# Patient Record
Sex: MALE | NOT HISPANIC OR LATINO | Employment: OTHER | URBAN - METROPOLITAN AREA
[De-identification: names, ages, dates, MRNs, and addresses within clinical notes are randomized per-mention and may not be internally consistent; named-entity substitution may affect disease eponyms.]

---

## 2017-01-09 ENCOUNTER — TELEPHONE (OUTPATIENT)
Dept: FAMILY MEDICINE | Facility: CLINIC | Age: 67
End: 2017-01-09

## 2017-01-09 NOTE — TELEPHONE ENCOUNTER
Luis Sanchez was interested in this patient potentially receiving [facility] services.  Could you please contact Clarke by phone or in clinic to determine his interest?  Thank you.

## 2017-01-12 ENCOUNTER — TELEPHONE (OUTPATIENT)
Dept: FAMILY MEDICINE | Facility: CLINIC | Age: 67
End: 2017-01-12

## 2017-01-12 NOTE — TELEPHONE ENCOUNTER
"[facility]  @[id number])@      Social Work Care Navigator Note      Patient: Clarke Moreira  Date: January 12, 2017  Preferred Name: Clarke    Previous PHQ-9:   PHQ-9 SCORE 8/1/2014 9/14/2016   Total Score 27 -   Total Score - 27     Previous CHRIS-7:   CHRIS-7 SCORE 8/4/2014   Total Score 18     LIZANDRO LEVEL:  No flowsheet data found.    Preferred Contact: @[id number]@  Type of Contact: Phone call (patient reached)Patient not reached, however the phone was answered.      Data: (subjective / Objective):    Referral from Lety Buenrostro and Dr. Sanchez to introduce to the [facility] service.  Left a message with contact information, as Clarke was not home at time of call.    Lupe Anderson, [facility] SW CCPatient response to [facility] Service offering:   Considering enrolling in [facility] services    Lupe Anderson, Social Work Care Coordinator   ________________________________________________________________  Administrative - Social Work Staff Info:     Update the [facility] Brief Needs Assessment Flowsheet \"enrollment status\"     \"declined\"    \"considering\" enrolling in [facility] services.      \"Interested and will enroll\"    \"waitlisted\"    Update enrollment status to \"enrolled\" only when they have come into clinic and completed the paper consent and brief needs assessment.    Verify that patient has had Diagnostic Assessment within the past 12 months and if not schedule an appointment with the [facility] to complete.                  "

## 2017-02-15 ENCOUNTER — DOCUMENTATION ONLY (OUTPATIENT)
Dept: VASCULAR SURGERY | Facility: CLINIC | Age: 67
End: 2017-02-15

## 2017-08-14 ENCOUNTER — OFFICE VISIT (OUTPATIENT)
Dept: FAMILY MEDICINE | Facility: CLINIC | Age: 67
End: 2017-08-14

## 2017-08-14 VITALS
HEART RATE: 92 BPM | BODY MASS INDEX: 57.1 KG/M2 | DIASTOLIC BLOOD PRESSURE: 73 MMHG | OXYGEN SATURATION: 96 % | SYSTOLIC BLOOD PRESSURE: 137 MMHG | WEIGHT: 315 LBS | RESPIRATION RATE: 18 BRPM | TEMPERATURE: 98.2 F

## 2017-08-14 DIAGNOSIS — I10 ESSENTIAL HYPERTENSION: ICD-10-CM

## 2017-08-14 DIAGNOSIS — E11.8 TYPE 2 DIABETES MELLITUS WITH COMPLICATION, WITHOUT LONG-TERM CURRENT USE OF INSULIN (H): Primary | ICD-10-CM

## 2017-08-14 DIAGNOSIS — L28.0 LICHEN OF SKIN: ICD-10-CM

## 2017-08-14 DIAGNOSIS — I10 ESSENTIAL HYPERTENSION WITH GOAL BLOOD PRESSURE LESS THAN 140/90: ICD-10-CM

## 2017-08-14 DIAGNOSIS — B35.1 ONYCHOMYCOSIS: ICD-10-CM

## 2017-08-14 DIAGNOSIS — E78.5 HYPERLIPIDEMIA LDL GOAL <100: ICD-10-CM

## 2017-08-14 DIAGNOSIS — E03.9 HYPOTHYROIDISM, UNSPECIFIED TYPE: ICD-10-CM

## 2017-08-14 DIAGNOSIS — E11.9 TYPE 2 DIABETES MELLITUS WITHOUT COMPLICATION, WITHOUT LONG-TERM CURRENT USE OF INSULIN (H): ICD-10-CM

## 2017-08-14 DIAGNOSIS — I87.2 STASIS DERMATITIS OF BOTH LEGS: ICD-10-CM

## 2017-08-14 LAB
ALBUMIN SERPL-MCNC: 4 MG/DL (ref 3.5–4.7)
ALP SERPL-CCNC: 84.4 U/L (ref 31.7–110.7)
ALT SERPL-CCNC: 15.6 U/L (ref 0–45)
AST SERPL-CCNC: 16.9 U/L (ref 0–55)
BILIRUB SERPL-MCNC: 1.2 MG/DL (ref 0.2–1.3)
BUN SERPL-MCNC: 6.7 MG/DL (ref 7–21)
CALCIUM SERPL-MCNC: 9.8 MG/DL (ref 8.5–10.1)
CHLORIDE SERPLBLD-SCNC: 94.7 MMOL/L (ref 98–110)
CHOLEST SERPL-MCNC: 94.3 MG/DL (ref 0–200)
CHOLEST/HDLC SERPL: 2.8 {RATIO} (ref 0–5)
CO2 SERPL-SCNC: 27.3 MMOL/L (ref 20–32)
CREAT SERPL-MCNC: 0.9 MG/DL (ref 0.7–1.3)
GFR SERPL CREATININE-BSD FRML MDRD: >90 ML/MIN/1.7 M2
GLUCOSE SERPL-MCNC: 140.3 MG'DL (ref 70–99)
HBA1C MFR BLD: 6.5 % (ref 4.1–5.7)
HDLC SERPL-MCNC: 33.7 MG/DL
LDLC SERPL CALC-MCNC: 46 MG/DL (ref 0–129)
POTASSIUM SERPL-SCNC: 4 MMOL/DL (ref 3.3–4.5)
PROT SERPL-MCNC: 7.3 G/DL (ref 6.8–8.8)
SODIUM SERPL-SCNC: 135.8 MMOL/L (ref 132.6–141.4)
TRIGL SERPL-MCNC: 71.5 MG/DL (ref 0–150)
TSH SERPL DL<=0.005 MIU/L-ACNC: 6.86 MU/L (ref 0.4–4)
VLDL CHOLESTEROL: 14.3 MG/DL (ref 7–32)

## 2017-08-14 RX ORDER — LEVOTHYROXINE SODIUM 150 UG/1
150 TABLET ORAL DAILY
Qty: 30 TABLET | Refills: 11 | Status: ON HOLD | OUTPATIENT
Start: 2017-08-14 | End: 2017-10-23

## 2017-08-14 RX ORDER — MULTIPLE VITAMINS W/ MINERALS TAB 9MG-400MCG
1 TAB ORAL DAILY
Status: ON HOLD | COMMUNITY
End: 2017-10-23

## 2017-08-14 RX ORDER — METOPROLOL SUCCINATE 100 MG/1
100 TABLET, EXTENDED RELEASE ORAL DAILY
Qty: 30 TABLET | Refills: 12 | Status: ON HOLD | OUTPATIENT
Start: 2017-08-14 | End: 2017-10-07

## 2017-08-14 RX ORDER — ATORVASTATIN CALCIUM 40 MG/1
40 TABLET, FILM COATED ORAL DAILY
Qty: 30 TABLET | Refills: 11 | Status: ON HOLD | OUTPATIENT
Start: 2017-08-14 | End: 2017-10-23

## 2017-08-14 RX ORDER — LISINOPRIL 10 MG/1
10 TABLET ORAL DAILY
Qty: 30 TABLET | Refills: 12 | Status: ON HOLD | OUTPATIENT
Start: 2017-08-14 | End: 2017-10-27

## 2017-08-14 RX ORDER — HYDROCHLOROTHIAZIDE 25 MG/1
25 TABLET ORAL DAILY
Qty: 30 TABLET | Refills: 12 | Status: ON HOLD | OUTPATIENT
Start: 2017-08-14 | End: 2017-10-07

## 2017-08-14 RX ORDER — OMEGA-3 FATTY ACIDS/FISH OIL 300-1000MG
1 CAPSULE ORAL DAILY
Qty: 90 CAPSULE | Refills: 3 | Status: ON HOLD | OUTPATIENT
Start: 2017-08-14 | End: 2017-10-23

## 2017-08-14 ASSESSMENT — ENCOUNTER SYMPTOMS
NAUSEA: 0
CHILLS: 0
UNEXPECTED WEIGHT CHANGE: 0
COLOR CHANGE: 1
COUGH: 0
FATIGUE: 1
CONSTIPATION: 0
FEVER: 0
WHEEZING: 0
DIARRHEA: 0
SHORTNESS OF BREATH: 0
CHEST TIGHTNESS: 0
EYE PAIN: 0
ABDOMINAL PAIN: 0
PALPITATIONS: 0
WOUND: 1
VOMITING: 0

## 2017-08-14 NOTE — PROGRESS NOTES
HPI:       Clarke Moreira is a 67 year old who presents for the following  Patient presents with:  Diabetes: follow up   Hypertension    Clarke comes to clinic today for a check of his diabetes, hypertension and hypothyroidism. He has been feeling generally well with the exception of fatigue and depressed mood. He has been dealing with depression and anxiety for a long time. Currently there are some things going on in the building he lives in that have been increasing his anxiety and depression. He has been working on dealing with these issues as best he can. In the past he has tried therapy and medication but did not find them to be helpful. He has some thoughts of suicide but no plans at this time.     He does not check his blood sugars currently. His diet varies, he does not currently limit processed carbohydrates and sugars. He does try to eat fruits and vegetables, usually frozen, as he is able. Tries to avoid heavily salty foods and does not add extra salt in cooking. He does not exercise regularly and admits to sedentary lifestyle. He denies chest pain or pressure. No heart palpitations or dizziness. He has neuropathy in legs/feet. He saw podiatry last year for his feet. They were able to cut his toenails and recommended Lac-hydrin cream. He difficulty applying the cream and has not been using it.        Diabetes Follow-up      Patient is checking blood sugars: not at all         -Last A1C was   Lab Results   Component Value Date    A1C 6.4 09/14/2016    A1C 6.5 08/01/2014    A1C 6.3 06/07/2013    A1C 6.7 03/13/2013    A1C 6.3 12/02/2011        Diabetic concerns: None    Chest Pain or exercise related calf pain (claudication):no     Symptoms of hypoglycemia (low blood sugar): none     Paresthesias (numbness or burning in feet) or sores: Yes has neuropathy in legs/feet. Also has sore on back of calf. New area of raised purple bumps on back of left calf. Is unable to check his feet or care for lower  extremities due to inability to bend over to reach feet.   Diabetic eye exam within the last year?: No, would like referral for this.  Hypertension Follow-up      Outpatient blood pressures are not being checked.    Chest Pain? :No     Low Salt Diet: no added salt  Daily NSAID Use? Takes aspirin daily, no other NSAID use        Did patient take their HTN pills today/last night as usual?  Yes   , Hypothyroidism Follow-up      Since last visit, patient describes the following symptoms: Weight stable, no hair loss, no constipation. Has skin changes to legs, dry scaly skin, but no new changes  Last TSH check was 7.07 on  9/14/17. He is currently taking 125mcg levothyroxine    Adherence and Exercise  Medication side effects: no  How often is a medication missed? Never  Exercise:walking  Minimal activity, walks to get mail. Admits to sedentary lifestyle.        Problem, Medication and Allergy Lists were reviewed and are current.  Patient is an established patient of this clinic.         Review of Systems:   Review of Systems   Constitutional: Positive for fatigue. Negative for chills, fever and unexpected weight change.   Eyes: Negative for pain and visual disturbance.   Respiratory: Negative for cough, chest tightness, shortness of breath and wheezing.    Cardiovascular: Negative for chest pain and palpitations.   Gastrointestinal: Negative for abdominal pain, constipation, diarrhea, nausea and vomiting.   Skin: Positive for color change, rash and wound.             Physical Exam:   Patient Vitals for the past 24 hrs:   BP Temp Temp src Pulse Resp SpO2 Weight   08/14/17 1316 137/73 98.2  F (36.8  C) Oral 92 18 96 % (!) 421 lb (191 kg)     Body mass index is 57.1 kg/(m^2).  Vitals were reviewed and were normal     Physical Exam   Constitutional: He appears well-developed and well-nourished. No distress.   Cardiovascular: Normal rate, regular rhythm and normal heart sounds.    +1 non-pitting edema of bilateral lower  extremities   Pulmonary/Chest: Effort normal and breath sounds normal.   Skin:   Bilateral lower extremities with venous stasis skin changes and scaling of dry skin. Large area of raised purple blisters on back of left calf. Healing scab the size of a dime on right shin and scabbed over ulceration on back of right calf. Skin is thin and pink-tinged throughout. No visible areas of cellulitis.   Bilateral feet with generalized thick, peeling skin.   Toenails are overgrown and thick.          Results:      Results from the last 24 hours  Results for orders placed or performed in visit on 08/14/17 (from the past 24 hour(s))   Hemoglobin A1c ([facility])   Result Value Ref Range    Hemoglobin A1C 6.5 (H) 4.1 - 5.7 %   Comprehensive Metabolic Panel ([facility])   Result Value Ref Range    Albumin 4.0 3.5 - 4.7 mg/dL    Alkaline Phosphatase 84.4 31.7 - 110.7 U/L    ALT 15.6 0.0 - 45.0 U/L    AST 16.9 0.0 - 55.0 U/L    Bilirubin Total 1.2 0.2 - 1.3 mg/dL    Urea Nitrogen 6.7 (L) 7.0 - 21.0 mg/dL    Calcium 9.8 8.5 - 10.1 mg/dL    Chloride 94.7 (L) 98.0 - 110.0 mmol/L    Carbon Dioxide 27.3 20.0 - 32.0 mmol/L    Creatinine 0.9 0.7 - 1.3 mg/dL    Glucose 140.3 (H) 70.0 - 99.0 mg'dL    Potassium 4.0 3.3 - 4.5 mmol/dL    Sodium 135.8 132.6 - 141.4 mmol/L    Protein Total 7.3 6.8 - 8.8 g/dL    GFR Estimate >90 >60.0 mL/min/1.7 m2    GFR Estimate If Black >90 >60.0 mL/min/1.7 m2   Lipid Cascade ([facility])   Result Value Ref Range    Cholesterol 94.3 0.0 - 200.0 mg/dL    Cholesterol/HDL Ratio 2.8 0.0 - 5.0    HDL Cholesterol 33.7 (L) >40.0 mg/dL    LDL Cholesterol Calculated 46 0 - 129 mg/dL    Triglycerides 71.5 0.0 - 150.0 mg/dL    VLDL Cholesterol 14.3 7.0 - 32.0 mg/dL     Assessment and Plan     1. Type 2 diabetes mellitus with complication, without long-term current use of insulin (H)  A1c 6.5 today. Stable on current does of metformin 500mg. Will continue with this dose. Encouraged him to continue to inspect feet for concerns.  Referral to opthalmology for diabetic eye exam. Lipids well controlled, will continue on current dose of atorvastatin 40mg. He should return to clinic in 6 months for a recheck of his diabetes or before if concerns arise.  - Hemoglobin A1c ([facility])  - Comprehensive Metabolic Panel ([facility])  - Lipid Cascade ([facility])  - Albumin Random Urine Quantitative; Future - unable to void at today's visit, plan to get at return visit.   - OPTHALMOLOGY ADULT REFERRAL - INTERNAL    2. Hypothyroidism, unspecified type  TSH levels pending. Will advise patient if changes need to be made to dose.  - TSH    3. Essential hypertension  BP in clinic today looks good. Electrolytes and kidney function WNL. Will continue with current medications: HCTZ 25 mg daily, lisinopril 10mg daily, metoprolol XR 100mg daily. Recheck of HTN in 6 months.    4. Lichen of skin  Encouraged him to care for legs and feet as able and moisturize as able. Will have him see dermatology for further recommendations.  - DERMATOLOGY REFERRAL - INTERNAL    5. Onychomycosis  10 toenails trimmed, excess subungual skin cleared away.  Encouraged him to return to clinic for further foot and nail care as he needs.  - DERMATOLOGY REFERRAL - INTERNAL  Discussed home care nursing for foot care - patient declined.      6.  MDD  PHQ-9 SCORE 8/1/2014 9/14/2016   Total Score 27 -   Total Score - 27   Offered behavioral therapy - patient declined.   Discussed continue care here in clinic, more frequent follow-up.     Follow-up in 3-6 months, sooner with any other concerns.     Options for treatment and follow-up care were reviewed with the patient. Clarke Moreira  engaged in the decision making process and verbalized understanding of the options discussed and agreed with the final plan.    Kaya Hastings, RN FNP student    History and physical examination done with student nurse practitioner.  Student acted as scribe for this encounter.  I agree with assessment and plan for  this patient.     Katharine Mcmullen, REAGAN CNP

## 2017-08-14 NOTE — PATIENT INSTRUCTIONS
Here is the plan from today's visit    Results for orders placed or performed in visit on 08/14/17   Hemoglobin A1c ([facility])   Result Value Ref Range    Hemoglobin A1C 6.5 (H) 4.1 - 5.7 %   Comprehensive Metabolic Panel ([facility])   Result Value Ref Range    Albumin 4.0 3.5 - 4.7 mg/dL    Alkaline Phosphatase 84.4 31.7 - 110.7 U/L    ALT 15.6 0.0 - 45.0 U/L    AST 16.9 0.0 - 55.0 U/L    Bilirubin Total 1.2 0.2 - 1.3 mg/dL    Urea Nitrogen 6.7 (L) 7.0 - 21.0 mg/dL    Calcium 9.8 8.5 - 10.1 mg/dL    Chloride 94.7 (L) 98.0 - 110.0 mmol/L    Carbon Dioxide 27.3 20.0 - 32.0 mmol/L    Creatinine 0.9 0.7 - 1.3 mg/dL    Glucose 140.3 (H) 70.0 - 99.0 mg'dL    Potassium 4.0 3.3 - 4.5 mmol/dL    Sodium 135.8 132.6 - 141.4 mmol/L    Protein Total 7.3 6.8 - 8.8 g/dL    GFR Estimate >90 >60.0 mL/min/1.7 m2    GFR Estimate If Black >90 >60.0 mL/min/1.7 m2   Lipid Cascade ([facility])   Result Value Ref Range    Cholesterol 94.3 0.0 - 200.0 mg/dL    Cholesterol/HDL Ratio 2.8 0.0 - 5.0    HDL Cholesterol 33.7 (L) >40.0 mg/dL    LDL Cholesterol Calculated 46 0 - 129 mg/dL    Triglycerides 71.5 0.0 - 150.0 mg/dL    VLDL Cholesterol 14.3 7.0 - 32.0 mg/dL       1. Type 2 diabetes mellitus with complication, without long-term current use of insulin (H)  - Hemoglobin A1c ([facility])  - Comprehensive Metabolic Panel ([facility])  - Lipid Cascade ([facility])  - Albumin Random Urine Quantitative; Future  - OPTHALMOLOGY ADULT REFERRAL - INTERNAL    2. Hypothyroidism, unspecified type  - TSH    3. Essential hypertension    4. Lichen of skin  - DERMATOLOGY REFERRAL - INTERNAL    5. Onychomycosis  - DERMATOLOGY REFERRAL - INTERNAL      Follow-up in 3-6 months, sooner as needed.       Thank you for coming to [facility] today.  Lab Testing:  **If you had lab testing today and your results are reassuring or normal they will be mailed to you or sent through [address] [facility] within 7 days.   **If the lab tests need quick action we will call you with the  results.  The phone number we will call with results is # [phone] (home) . If this is not the best number please call our clinic and change the number.  Medication Refills:  If you need any refills please call your pharmacy and they will contact us.   If you need to  your refill at a new pharmacy, please contact the new pharmacy directly. The new pharmacy will help you get your medications transferred faster.   Scheduling:  If you have any concerns about today's visit or wish to schedule another appointment please call our office during normal business hours [phone] (8-5:00 M-F)  If a referral was made to a [facility] and you don't get a call from central scheduling please call [phone].  If a Mammogram was ordered for you at [facility] call [phone] to schedule or change your appointment.  If you had an XRay/CT/Ultrasound/MRI ordered the number is [phone] to schedule or change your radiology appointment.   Medical Concerns:  If you have urgent medical concerns please call [phone] at any time of the day.  If you have a medical emergency please call [phone].

## 2017-08-15 ENCOUNTER — TELEPHONE (OUTPATIENT)
Dept: FAMILY MEDICINE | Facility: CLINIC | Age: 67
End: 2017-08-15

## 2017-08-15 DIAGNOSIS — E03.9 HYPOTHYROIDISM, UNSPECIFIED TYPE: Primary | ICD-10-CM

## 2017-08-15 NOTE — TELEPHONE ENCOUNTER
Called patient and discussed elevated TSH.  Levothyroxine dose increased to 150 mcg.  Plan for recheck of TSH in 8 weeks.  Future lab order placed in chart. - Pat, CNP

## 2017-09-23 ENCOUNTER — APPOINTMENT (OUTPATIENT)
Dept: GENERAL RADIOLOGY | Facility: CLINIC | Age: 67
DRG: 291 | End: 2017-09-23
Attending: EMERGENCY MEDICINE
Payer: COMMERCIAL

## 2017-09-23 ENCOUNTER — HOSPITAL ENCOUNTER (INPATIENT)
Facility: CLINIC | Age: 67
LOS: 15 days | Discharge: NURSING, REHAB OR LONG-TERM CARE | DRG: 291 | End: 2017-10-08
Attending: EMERGENCY MEDICINE | Admitting: INTERNAL MEDICINE
Payer: COMMERCIAL

## 2017-09-23 ENCOUNTER — APPOINTMENT (OUTPATIENT)
Dept: CT IMAGING | Facility: CLINIC | Age: 67
DRG: 291 | End: 2017-09-23
Attending: EMERGENCY MEDICINE
Payer: COMMERCIAL

## 2017-09-23 DIAGNOSIS — F33.41 RECURRENT MAJOR DEPRESSIVE DISORDER, IN PARTIAL REMISSION (H): ICD-10-CM

## 2017-09-23 DIAGNOSIS — I10 ESSENTIAL HYPERTENSION: ICD-10-CM

## 2017-09-23 DIAGNOSIS — E87.70 HYPERVOLEMIA, UNSPECIFIED HYPERVOLEMIA TYPE: ICD-10-CM

## 2017-09-23 DIAGNOSIS — I48.91 ATRIAL FIBRILLATION, UNSPECIFIED TYPE (H): ICD-10-CM

## 2017-09-23 DIAGNOSIS — R07.9 CHEST PAIN, UNSPECIFIED: ICD-10-CM

## 2017-09-23 DIAGNOSIS — R06.02 SOB (SHORTNESS OF BREATH): ICD-10-CM

## 2017-09-23 DIAGNOSIS — M62.81 GENERALIZED MUSCLE WEAKNESS: ICD-10-CM
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
## 2017-09-23 DIAGNOSIS — I73.9 PAD (PERIPHERAL ARTERY DISEASE) (H): Primary | ICD-10-CM

## 2017-09-23 DIAGNOSIS — R33.9 URINARY RETENTION: ICD-10-CM
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
## 2017-09-23 PROBLEM — E78.5 HYPERLIPIDEMIA LDL GOAL <100: Status: ACTIVE | Noted: 2017-09-23

## 2017-09-23 LAB
ALBUMIN SERPL-MCNC: 2.6 G/DL (ref 3.4–5)
ALBUMIN UR-MCNC: NEGATIVE MG/DL
ALP SERPL-CCNC: 70 U/L (ref 40–150)
ALT SERPL W P-5'-P-CCNC: 21 U/L (ref 0–70)
ANION GAP SERPL CALCULATED.3IONS-SCNC: 10 MMOL/L (ref 3–14)
APPEARANCE UR: CLEAR
AST SERPL W P-5'-P-CCNC: 17 U/L (ref 0–45)
BASOPHILS # BLD AUTO: 0 10E9/L (ref 0–0.2)
BASOPHILS NFR BLD AUTO: 0.1 %
BILIRUB SERPL-MCNC: 1 MG/DL (ref 0.2–1.3)
BILIRUB UR QL STRIP: NEGATIVE
BUN SERPL-MCNC: 10 MG/DL (ref 7–30)
CALCIUM SERPL-MCNC: 8.2 MG/DL (ref 8.5–10.1)
CHLORIDE SERPL-SCNC: 87 MMOL/L (ref 94–109)
CK SERPL-CCNC: 84 U/L (ref 30–300)
CO2 SERPL-SCNC: 29 MMOL/L (ref 20–32)
COLOR UR AUTO: ABNORMAL
CREAT SERPL-MCNC: 0.76 MG/DL (ref 0.66–1.25)
CREAT SERPL-MCNC: 0.77 MG/DL (ref 0.66–1.25)
D DIMER PPP FEU-MCNC: 0.8 UG/ML FEU (ref 0–0.5)
DIFFERENTIAL METHOD BLD: ABNORMAL
EOSINOPHIL # BLD AUTO: 0 10E9/L (ref 0–0.7)
EOSINOPHIL NFR BLD AUTO: 0.2 %
ERYTHROCYTE [DISTWIDTH] IN BLOOD BY AUTOMATED COUNT: 13.7 % (ref 10–15)
GFR SERPL CREATININE-BSD FRML MDRD: >90 ML/MIN/1.7M2
GFR SERPL CREATININE-BSD FRML MDRD: >90 ML/MIN/1.7M2
GLUCOSE SERPL-MCNC: 120 MG/DL (ref 70–99)
GLUCOSE UR STRIP-MCNC: NEGATIVE MG/DL
HCT VFR BLD AUTO: 51.6 % (ref 40–53)
HGB BLD-MCNC: 17.2 G/DL (ref 13.3–17.7)
HGB UR QL STRIP: NEGATIVE
IMM GRANULOCYTES # BLD: 0 10E9/L (ref 0–0.4)
IMM GRANULOCYTES NFR BLD: 0.1 %
KETONES UR STRIP-MCNC: NEGATIVE MG/DL
LEUKOCYTE ESTERASE UR QL STRIP: NEGATIVE
LYMPHOCYTES # BLD AUTO: 0.8 10E9/L (ref 0.8–5.3)
LYMPHOCYTES NFR BLD AUTO: 7.4 %
MAGNESIUM SERPL-MCNC: 1.5 MG/DL (ref 1.6–2.3)
MCH RBC QN AUTO: 31.2 PG (ref 26.5–33)
MCHC RBC AUTO-ENTMCNC: 33.3 G/DL (ref 31.5–36.5)
MCV RBC AUTO: 94 FL (ref 78–100)
MONOCYTES # BLD AUTO: 1 10E9/L (ref 0–1.3)
MONOCYTES NFR BLD AUTO: 8.9 %
MUCOUS THREADS #/AREA URNS LPF: PRESENT /LPF
NEUTROPHILS # BLD AUTO: 9 10E9/L (ref 1.6–8.3)
NEUTROPHILS NFR BLD AUTO: 83.3 %
NITRATE UR QL: NEGATIVE
NRBC # BLD AUTO: 0 10*3/UL
NRBC BLD AUTO-RTO: 0 /100
NT-PROBNP SERPL-MCNC: 1745 PG/ML (ref 0–900)
OSMOLALITY UR: 161 MMOL/KG (ref 100–1200)
PH UR STRIP: 6 PH (ref 5–7)
PLATELET # BLD AUTO: 158 10E9/L (ref 150–450)
PLATELET # BLD AUTO: 192 10E9/L (ref 150–450)
POTASSIUM SERPL-SCNC: 3.8 MMOL/L (ref 3.4–5.3)
POTASSIUM UR-SCNC: 8 MMOL/L
PROT SERPL-MCNC: 5.6 G/DL (ref 6.8–8.8)
RBC # BLD AUTO: 5.52 10E12/L (ref 4.4–5.9)
RBC #/AREA URNS AUTO: 0 /HPF (ref 0–2)
SODIUM SERPL-SCNC: 125 MMOL/L (ref 133–144)
SODIUM UR-SCNC: 43 MMOL/L
SOURCE: ABNORMAL
SP GR UR STRIP: 1 (ref 1–1.03)
TROPONIN I BLD-MCNC: 0 UG/L (ref 0–0.1)
TROPONIN I SERPL-MCNC: 0.01 UG/L (ref 0–0.04)
TROPONIN I SERPL-MCNC: <0.015 UG/L (ref 0–0.04)
TSH SERPL DL<=0.005 MIU/L-ACNC: 3.11 MU/L (ref 0.4–4)
UROBILINOGEN UR STRIP-MCNC: NORMAL MG/DL (ref 0–2)
WBC # BLD AUTO: 10.8 10E9/L (ref 4–11)
WBC #/AREA URNS AUTO: <1 /HPF (ref 0–2)

## 2017-09-23 PROCEDURE — 25000132 ZZH RX MED GY IP 250 OP 250 PS 637: Performed by: INTERNAL MEDICINE

## 2017-09-23 PROCEDURE — 84300 ASSAY OF URINE SODIUM: CPT | Performed by: INTERNAL MEDICINE

## 2017-09-23 PROCEDURE — 71010 XR CHEST PORT 1 VW: CPT

## 2017-09-23 PROCEDURE — 25000128 H RX IP 250 OP 636: Performed by: RADIOLOGY

## 2017-09-23 PROCEDURE — 84484 ASSAY OF TROPONIN QUANT: CPT | Performed by: EMERGENCY MEDICINE

## 2017-09-23 PROCEDURE — 25000128 H RX IP 250 OP 636: Performed by: INTERNAL MEDICINE

## 2017-09-23 PROCEDURE — 84443 ASSAY THYROID STIM HORMONE: CPT | Performed by: EMERGENCY MEDICINE

## 2017-09-23 PROCEDURE — 87040 BLOOD CULTURE FOR BACTERIA: CPT | Performed by: INTERNAL MEDICINE

## 2017-09-23 PROCEDURE — 25000128 H RX IP 250 OP 636: Performed by: EMERGENCY MEDICINE

## 2017-09-23 PROCEDURE — 81001 URINALYSIS AUTO W/SCOPE: CPT | Performed by: INTERNAL MEDICINE

## 2017-09-23 PROCEDURE — 84484 ASSAY OF TROPONIN QUANT: CPT

## 2017-09-23 PROCEDURE — 25000125 ZZHC RX 250: Performed by: EMERGENCY MEDICINE

## 2017-09-23 PROCEDURE — 85025 COMPLETE CBC W/AUTO DIFF WBC: CPT | Performed by: EMERGENCY MEDICINE

## 2017-09-23 PROCEDURE — 99291 CRITICAL CARE FIRST HOUR: CPT | Mod: 25 | Performed by: EMERGENCY MEDICINE

## 2017-09-23 PROCEDURE — 12000001 ZZH R&B MED SURG/OB UMMC

## 2017-09-23 PROCEDURE — 71260 CT THORAX DX C+: CPT

## 2017-09-23 PROCEDURE — 93005 ELECTROCARDIOGRAM TRACING: CPT | Performed by: EMERGENCY MEDICINE

## 2017-09-23 PROCEDURE — 96361 HYDRATE IV INFUSION ADD-ON: CPT | Performed by: EMERGENCY MEDICINE

## 2017-09-23 PROCEDURE — 84133 ASSAY OF URINE POTASSIUM: CPT | Performed by: INTERNAL MEDICINE

## 2017-09-23 PROCEDURE — 80053 COMPREHEN METABOLIC PANEL: CPT | Performed by: EMERGENCY MEDICINE

## 2017-09-23 PROCEDURE — 93010 ELECTROCARDIOGRAM REPORT: CPT | Mod: Z6 | Performed by: EMERGENCY MEDICINE

## 2017-09-23 PROCEDURE — 82550 ASSAY OF CK (CPK): CPT | Performed by: EMERGENCY MEDICINE

## 2017-09-23 PROCEDURE — 84484 ASSAY OF TROPONIN QUANT: CPT | Performed by: INTERNAL MEDICINE

## 2017-09-23 PROCEDURE — 96374 THER/PROPH/DIAG INJ IV PUSH: CPT | Performed by: EMERGENCY MEDICINE

## 2017-09-23 PROCEDURE — 36415 COLL VENOUS BLD VENIPUNCTURE: CPT | Performed by: INTERNAL MEDICINE

## 2017-09-23 PROCEDURE — 96375 TX/PRO/DX INJ NEW DRUG ADDON: CPT | Performed by: EMERGENCY MEDICINE

## 2017-09-23 PROCEDURE — 83935 ASSAY OF URINE OSMOLALITY: CPT | Performed by: INTERNAL MEDICINE

## 2017-09-23 PROCEDURE — 99285 EMERGENCY DEPT VISIT HI MDM: CPT | Mod: 25 | Performed by: EMERGENCY MEDICINE

## 2017-09-23 PROCEDURE — 85049 AUTOMATED PLATELET COUNT: CPT | Performed by: INTERNAL MEDICINE

## 2017-09-23 PROCEDURE — 83880 ASSAY OF NATRIURETIC PEPTIDE: CPT | Performed by: EMERGENCY MEDICINE

## 2017-09-23 PROCEDURE — 83735 ASSAY OF MAGNESIUM: CPT | Performed by: EMERGENCY MEDICINE

## 2017-09-23 PROCEDURE — 82565 ASSAY OF CREATININE: CPT | Performed by: INTERNAL MEDICINE

## 2017-09-23 PROCEDURE — 85379 FIBRIN DEGRADATION QUANT: CPT | Performed by: EMERGENCY MEDICINE

## 2017-09-23 RX ORDER — METOPROLOL SUCCINATE 50 MG/1
100 TABLET, EXTENDED RELEASE ORAL DAILY
Status: DISCONTINUED | OUTPATIENT
Start: 2017-09-24 | End: 2017-09-25

## 2017-09-23 RX ORDER — AMOXICILLIN 250 MG
1-2 CAPSULE ORAL 2 TIMES DAILY
Status: DISCONTINUED | OUTPATIENT
Start: 2017-09-23 | End: 2017-09-26

## 2017-09-23 RX ORDER — ASPIRIN 81 MG/1
81 TABLET, CHEWABLE ORAL DAILY
Status: DISCONTINUED | OUTPATIENT
Start: 2017-09-24 | End: 2017-10-08 | Stop reason: HOSPADM

## 2017-09-23 RX ORDER — MAGNESIUM CARB/ALUMINUM HYDROX 105-160MG
148 TABLET,CHEWABLE ORAL
Status: DISCONTINUED | OUTPATIENT
Start: 2017-09-23 | End: 2017-10-08 | Stop reason: HOSPADM

## 2017-09-23 RX ORDER — HYDROCHLOROTHIAZIDE 25 MG/1
25 TABLET ORAL DAILY
Status: DISCONTINUED | OUTPATIENT
Start: 2017-09-23 | End: 2017-09-23

## 2017-09-23 RX ORDER — METOPROLOL TARTRATE 1 MG/ML
5 INJECTION, SOLUTION INTRAVENOUS ONCE
Status: COMPLETED | OUTPATIENT
Start: 2017-09-23 | End: 2017-09-23

## 2017-09-23 RX ORDER — ONDANSETRON 4 MG/1
4 TABLET, ORALLY DISINTEGRATING ORAL EVERY 6 HOURS PRN
Status: DISCONTINUED | OUTPATIENT
Start: 2017-09-23 | End: 2017-10-08 | Stop reason: HOSPADM

## 2017-09-23 RX ORDER — IOPAMIDOL 755 MG/ML
100 INJECTION, SOLUTION INTRAVASCULAR ONCE
Status: COMPLETED | OUTPATIENT
Start: 2017-09-23 | End: 2017-09-23

## 2017-09-23 RX ORDER — ATORVASTATIN CALCIUM 40 MG/1
40 TABLET, FILM COATED ORAL DAILY
Status: DISCONTINUED | OUTPATIENT
Start: 2017-09-24 | End: 2017-10-08 | Stop reason: HOSPADM

## 2017-09-23 RX ORDER — FUROSEMIDE 10 MG/ML
40 INJECTION INTRAMUSCULAR; INTRAVENOUS ONCE
Status: COMPLETED | OUTPATIENT
Start: 2017-09-23 | End: 2017-09-23

## 2017-09-23 RX ORDER — MULTIPLE VITAMINS W/ MINERALS TAB 9MG-400MCG
1 TAB ORAL DAILY
Status: DISCONTINUED | OUTPATIENT
Start: 2017-09-24 | End: 2017-10-08 | Stop reason: HOSPADM

## 2017-09-23 RX ORDER — CHLORAL HYDRATE 500 MG
1 CAPSULE ORAL DAILY
Status: DISCONTINUED | OUTPATIENT
Start: 2017-09-24 | End: 2017-10-08 | Stop reason: HOSPADM

## 2017-09-23 RX ORDER — FUROSEMIDE 10 MG/ML
20 INJECTION INTRAMUSCULAR; INTRAVENOUS ONCE
Status: COMPLETED | OUTPATIENT
Start: 2017-09-23 | End: 2017-09-23

## 2017-09-23 RX ORDER — ONDANSETRON 2 MG/ML
4 INJECTION INTRAMUSCULAR; INTRAVENOUS EVERY 6 HOURS PRN
Status: DISCONTINUED | OUTPATIENT
Start: 2017-09-23 | End: 2017-10-08 | Stop reason: HOSPADM

## 2017-09-23 RX ORDER — BISACODYL 5 MG
5-15 TABLET, DELAYED RELEASE (ENTERIC COATED) ORAL DAILY PRN
Status: DISCONTINUED | OUTPATIENT
Start: 2017-09-23 | End: 2017-10-08 | Stop reason: HOSPADM

## 2017-09-23 RX ORDER — LISINOPRIL 10 MG/1
10 TABLET ORAL DAILY
Status: DISCONTINUED | OUTPATIENT
Start: 2017-09-24 | End: 2017-10-08 | Stop reason: HOSPADM

## 2017-09-23 RX ORDER — AMMONIUM LACTATE 12 G/100G
CREAM TOPICAL 2 TIMES DAILY PRN
Status: DISCONTINUED | OUTPATIENT
Start: 2017-09-23 | End: 2017-10-08 | Stop reason: HOSPADM

## 2017-09-23 RX ORDER — NALOXONE HYDROCHLORIDE 0.4 MG/ML
.1-.4 INJECTION, SOLUTION INTRAMUSCULAR; INTRAVENOUS; SUBCUTANEOUS
Status: DISCONTINUED | OUTPATIENT
Start: 2017-09-23 | End: 2017-09-28

## 2017-09-23 RX ORDER — POLYETHYLENE GLYCOL 3350 17 G/17G
17 POWDER, FOR SOLUTION ORAL DAILY PRN
Status: DISCONTINUED | OUTPATIENT
Start: 2017-09-23 | End: 2017-09-26

## 2017-09-23 RX ADMIN — IOPAMIDOL 100 ML: 755 INJECTION, SOLUTION INTRAVENOUS at 14:03

## 2017-09-23 RX ADMIN — SODIUM CHLORIDE 1000 ML: 9 INJECTION, SOLUTION INTRAVENOUS at 10:50

## 2017-09-23 RX ADMIN — FUROSEMIDE 40 MG: 10 INJECTION, SOLUTION INTRAVENOUS at 15:50

## 2017-09-23 RX ADMIN — METOPROLOL TARTRATE 5 MG: 5 INJECTION INTRAVENOUS at 15:50

## 2017-09-23 RX ADMIN — CEPHALEXIN 250 MG: 250 CAPSULE ORAL at 21:34

## 2017-09-23 RX ADMIN — METOPROLOL TARTRATE 5 MG: 5 INJECTION INTRAVENOUS at 11:14

## 2017-09-23 RX ADMIN — FUROSEMIDE 20 MG: 10 INJECTION, SOLUTION INTRAVENOUS at 21:34

## 2017-09-23 RX ADMIN — ENOXAPARIN SODIUM 40 MG: 40 INJECTION SUBCUTANEOUS at 21:34

## 2017-09-23 ASSESSMENT — ENCOUNTER SYMPTOMS
POLYPHAGIA: 0
DYSURIA: 0
FEVER: 0
ABDOMINAL PAIN: 0
CHILLS: 0
NAUSEA: 0
CONSTIPATION: 0
POLYDIPSIA: 0
SHORTNESS OF BREATH: 0
BLOOD IN STOOL: 0
VOMITING: 0
HEADACHES: 0

## 2017-09-23 NOTE — PROGRESS NOTES
"Social Work: Assessment with Discharge Plan    Patient Name:  Clarke Moreira  :  1950  Age:  67 year old  MRN:  [id number]  Risk/Complexity Score:     Completed assessment with:  Pt and chart review    Presenting Information   Reason for Referral:  Discharge plan and Mental illness  Date of Intake:  2017  Referral Source:  Nurse  Decision Maker:  Pt  Alternate Decision Maker:  Pt unable to identify NOK. Pt grew up with single mom who was an only child and Clarke himself is an only child with no kids.  Health Care Directive:  Pt advised he doesn't have on and no one to help him fill one out. Pt was forgetful during SW assessment and identified he frequently dissociates, unsure if pt unable to complete at this time.  Living Situation:  Apartment  Previous Functional Status: Pt receives no assistance with ADLs at home but lacks energy/interest to do them independently.   Patient and family understanding of hospitalization:  Pt states he is in the hospital because he fell and couldn't get up today. He requested a social work consult because he feels he is not holding up well at home.  Cultural/Language/Spiritual Considerations:  English speaking  Adjustment to Illness:  Pt feels he is a disappointment and has a lot of shame around how he's let himself go. He advised that he doesn't care if he lives or dies but denies a plan to kill himself and denies a history of suicide attempts.    Physical Health  Reason for Admission:  No diagnosis found.  Services Needed/Recommended:  TCU vs LTC    Mental Health/Chemical Dependency  Diagnosis:  Self-identifies as having PTSD, depression, and panic attacks. Loses his train of thought during conversation and self-identifies this as dissociating. History of alcohol abuse, denies alcohol and other substance use at this time. Also advises that he has \"overstressed emotional responses\" that impair his ability to maintain relationships  Support/Services in " Place:  None  Services Needed/Recommended:  Case management, talk therapy, Adult protection follow-up.    Support System  Significant relationship at present time:  None identified.  Family of origin is available for support:  No.  Other support available:  None.  Gaps in support system:  Pt has no natural support, no friends, family, and no mental health or physical health support.  Patient is caregiver to:  None     Provider Information   Primary Care Physician:  Matthias Sanchez   [phone]   Clinic:  [facility] [address] [address]      :  Pt says he has worked with [facility] in the past but denies having a specific .    Financial   Income Source:  Disability  Financial Concerns:  In subsidized housing at the moment that he found on his own.  Insurance:    Payor/Plan Subscriber Name Rel Member # Group #   UC[employer] - [employer]*  [id number] [facility]      [address]       Discharge Plan   Patient and family discharge goal:  Open to suggestions. Admits he can no longer care for himself.  Provided education on discharge plan:  YES Advised on the difference between TCU and LTC.  Patient agreeable to discharge plan:  YES  A list of Medicare Certified Facilities was provided to the patient and/or family to encourage patient choice. Patient's choices for facility are:  TCU vs LTC dependent on further medical evaluation.  Will NH provide Skilled rehabilitation or complex medical:  NO  General information regarding anticipated insurance coverage and possible out of pocket cost was discussed. Patient and patient's family are aware patient may incur the cost of transportation to the facility, pending insurance payment: NO  Barriers to discharge:  Pending medical clearance.    Discharge Recommendations   Anticipated Disposition:  Facility:  TBD  Transportation Needs:  Medical Rides  Name of Transportation Company and Phone:  [employer] transportation  "[phone] vs [facility] [phone]    Additional comments     Pt presents today after falling in his home and not being able to get up. He requested to meet with SW after nurse inquired with EMS on whether a social work consult would be beneficial. Clarke expressed immense shame over the life choices he has made. In his conversation with this SW, he would often forget what he was talking about and SW would need to redirect. SW left the room after speaking with the pt for 10 minutes while pt got imaging and when SW returned Clarke did not recognize her or remember the previous conversation. SW reintroduced herself and Clarke began to recollect some of the previous conversation. He informed this SW that he has PTSD and sometimes dissociates or simply forgets recent events. He said he used to get therapy twice a week but then his therapist moved around 2009 and he said he didn't have the energy to pursue finding another. He also retired around this time and has slowly become more and more bound to a chair in his home. With great reservation, he confided in this SW that he uses \"alternative facilities\" to go to the bathroom, and that food is delivered to him by two people who used to work for [employer].He did not want SW to contact these individuals, so he did not give a name or number. The only other human contact he has is someone who brings library books to his home occasionally. He again remarks that this is a casual volunteer that he doesn't want anyone to reach out to.  Clarke says he has a fear of leaving his home without of  and this has restricted from receiving any assistance as he has no one who he can run errands or attend appointments with.     Pt did have a SW at the [facility] apt he lives at that he had a good relationship with. He remarks she left a few weeks ago and they have yet to fill her position. Clarke identifies himself as abrasive with people, and therefore " "unable to maintain relationships but was able to have a good relationship with this SW. This SW asked if there were specific triggers to his abrasive \"overstressed emotional responses\" and he could not identify those that would be important is his medical care. Of note, a few weeks ago, he also got a diabetes diagnosis and he feels a lot of shame around this as well. He said prior to these events, he was getting up more. Now when he gets up, its about 10-12 steps to get something to eat or use the toilet and otherwise he stays in his chair at all times. Clarke advised that in the last couple years, he has fallen multiple times trying to use the tub, and now just uses a hand towel.    Clarke expressed to this SW that he does not care if he lives or dies, but denies a plan to intentionally hurt himself. He acknowledges he lacks the energy to \"get back on his feet\" on his own and would like additional support. He is open to TCU and LTC options.    Adult Protection Report filed under self-neglect. Report #[id number]. Clarke is notified of this.    Andra WALKER, [facility]  ED   ED Pager: [phone]  On-call/After hours Pager: [phone]  "

## 2017-09-23 NOTE — IP AVS SNAPSHOT
MRN:[id number]                      After Visit Summary   9/23/2017    Clarke Moreira    MRN: [id number]           Thank you!     Thank you for choosing [facility] for your care. Our goal is always to provide you with excellent care. Hearing back from our patients is one way we can continue to improve our services. Please take a few minutes to complete the written survey that you may receive in the mail after you visit with us. Thank you!        Patient Information     Date Of Birth          1950        Designated Caregiver       Most Recent Value    Caregiver    Will someone help with your care after discharge? no      About your hospital stay     You were admitted on:  September 23, 2017 You last received care in the:  [address] [facility]    You were discharged on:  October 8, 2017        Reason for your hospital stay       You were admitted following a fall due to physical decompensation. The hospitalization was complicated by respiratory failure and fluid retention. You respiratory status improved with CPAP every night. On admission your weight was 460 and on discharge [phone].                  Who to Call     For medical emergencies, please call 911.  For non-urgent questions about your medical care, please call your primary care provider or clinic, [phone]          Attending Provider     Provider Specialty    Joel Lyons MD Emergency Medicine    Ajay, MD [facility]    Demetra Stout MD Pulmonary    Usher, Clarke Payne MD Internal Medicine    Janiya Niño MD Internal Medicine       Primary Care Provider Office Phone # Fax #    Matthias Sanchez [phone]      After Care Instructions     Activity - Up with nursing assistance           Advance Diet as Tolerated       Follow this diet upon discharge: Orders Placed This Encounter      Fluid restriction 1500 ML FLUID      Moderate Consistent CHO Diet            Bladder scan       X 2  for post void residual            Daily weights       Call Provider for weight gain of more than 2 pounds per day or 5 pounds per week.            General info for SNF       Length of Stay Estimate: Long Term Care  Condition at Discharge: Stable  Level of care:skilled   Rehabilitation Potential: Fair  Admission H&P remains valid and up-to-date: Yes  Recent Chemotherapy: N/A  Use Nursing Home Standing Orders: Yes            Intake and output       Every shift            Oxygen - Nasal cannula       1-2 Lpm by nasal cannula to keep O2 sats 88% or greater.            Wound care (specify)       Site:   Right and left leg  Instructions:  Please change dressings daily, clean wounds daily and apply lymphedema wraps                  Follow-up Appointments     Follow Up and recommended labs and tests       Follow up with half-way physician.  The following labs/tests are recommended: BMP, magnesium.    On discharge see your primary care provider in one week. Also you were referred to vascular surgery and cardiology for evaluation of your legs and heart respectively.                  Your next 10 appointments already scheduled     Nov 02, 2017  2:00 PM CDT   US VICK DOPPLER NO EXERCISE 1-2 LVLS BILAT with [id number]   [facility])    [address] [place] [address] [phone]           Please bring a list of your medicines (including vitamins, minerals and over-the-counter drugs). Also, tell your doctor about any allergies you may have. Wear comfortable clothes and leave your valuables at home.  No caffeine or tobacco for 1 hour prior to exam.  Please call the Imaging Department at your exam site with any questions.            Nov 02, 2017  3:00 PM CDT   US TCO2 with [id number] [facility]  ([facility])    [address] [address] [phone]           Please bring a list  of your medicines (including vitamins, minerals and over-the-counter drugs). Also, tell your doctor about any allergies you may have. Wear comfortable clothes and leave your valuables at home.  No caffeine or tobacco for 1 hour prior to exam.  Please call the Imaging Department at your exam site with any questions.            Nov 02, 2017  4:00 PM CDT   (Arrive by 3:45 PM)   New Vascular Visit with Christa Velazquez MD   [facility] ([facility])    [address] [facility]  3rd Floor  [facility] [address] [phone]              Additional Services     CARDIOLOGY EVAL ADULT REFERRAL       Your provider has referred you to: [facility] heart failure clinic    Please be aware that coverage of these services is subject to the terms and limitations of your health insurance plan.  Call member services at your health plan with any benefit or coverage questions.      Type of Referral:  New Cardiology Consult    Timeframe requested:  1-2 weeks after discharge    Please bring the following to your appointment:  >>   Any x-rays, CTs or MRIs which have been performed.  Contact the facility where they were done to arrange for  prior to your scheduled appointment.    >>   List of current medications  >>   This referral request   >>   Any documents/labs given to you for this referral            INR CLINIC REFERRAL           Mental Health Referral           Occupational Therapy Adult Consult       Evaluate and treat as clinically indicated.    Reason:  Continue OT in TCU            Physical Therapy Adult Consult       Evaluate and treat as clinically indicated.    Reason:  Continue to follow PT at TCU            Vascular Surgery Referral                 Future tests that were ordered for you     US VICK Doppler No Exercise           US TCO2 Bilateral       Have you called the [facility] to schedule this exam?  Please call [phone].            HEART FAILURE ACTION PLAN       Heart  "Failure Action Plan can be completed in any of the following locations:        Heart Failure (Care Package) under patient instructions        Letters under \"Heart Failure Action Plan\"                INR           US Low Extremity Arterial Duplex Bilateral & VICK w Exercise       PLEASE ALSO INCLUDE TOE BRACHIAL INDEX                  Warfarin Instruction     You have started taking a medicine called warfarin. This is a blood-thinning medicine (anticoagulant). It helps prevent and treat blood clots.      Before leaving the hospital, make sure you know how much to take and how long to take it.      You will need regular blood tests to make sure your blood is clotting safely. It is very important to see your doctor for regular blood tests.    Talk to your doctor before taking any new medicine (this includes over-the-counter drugs and herbal products). Many medicines can interact with warfarin. This may cause more bleeding or too much clotting.     Eating a lot of vitamin K--found in green, leafy vegetables--can change the way warfarin works in your body. Do NOT avoid these foods. Instead, try to eat the same amount each day.     Bleeding is the most common side-effect of warfarin. You may notice bleeding gums, a bloody nose, bruises and bleeding longer when you cut yourself. See a doctor at once if:   o You cough up blood  o You find blood in your stool (poop)  o You have a deep cut, or a cut that bleeds longer than 10 minutes   o You have a bad cut, hard fall, accident or hit your head (go to urgent care or the emergency room).    For women who can get pregnant: This medicine can harm an unborn baby. Be very careful not to get pregnant while taking this medicine. If you think you might be pregnant, call your doctor right away.    For more information, read \"Guide to Warfarin Therapy,  the booklet you received in the hospital.        General Recommendations To Control Heart Failure When You Get Home       Heart Failure " Instructions for Patients and Families: Please read and check off each of these important instructions as you do them when you get home.          Weight and Symptoms       ___ Put a scale in your bathroom.    ___ Post a weight chart or calendar next to your scale.    ___ Weigh yourself everyday as soon as you get up in the morning (before breakfast).  You should only be wearing your pajamas.  Write your weight on the chart/calendar.  ___ Bring your weight chart/calendar with you to all appointments.  ___ Call your doctor or nurse practitioner if you gain 2 pounds (in 1 day) or 5 pounds in (1 week) from your goal  good  weight.  Your good weight is also called your  dry  weight.  Your doctor or nurse will tell you what your good weight should be.    ___ Call your doctor or nurse practitioner if you have shortness of breath that gets worse over time, leg swelling or fatigue.       Medications and Diet       ___ Make sure to take your medication as prescribed.    ___ Bring a current list of your medication and all of your medicine bottles with you to all appointments.    ___ Limit fluids if you still have swelling or shortness of breath, or if your doctor tells you to do so.    ___ Eat less than 2000 mg of sodium (salt) every day. Read food labels, and do not add salt to meals. Remember, if you eat less salt you retain less fluid.  ___ Follow a heart healthy diet that is low in saturated fat.        Activity and Suggested Lifestyle Changes      ___ Stay active. Talk to your doctor about an exercise program that is safe for your heart.  ___ Stop smoking. Reduce alcohol use.      ___ Lose weight if you are overweight. Extra weight puts a lot of stress on the heart.                 Control for Leg Swelling     ___ Keep your legs elevated (raised) as needed for swelling. If swelling is uncomfortable or elevation doesn t help, ask your doctor about using ACE wraps or support stockings.        What is the C.O.R.E.  Clinic?  Cardiomyopathy  Optimization  Rehabilitation  Education    The [facility].R.E. Clinic is a heart failure specialty clinic within [facility].  It is an outpatient disease management program that is based on a phase-by-phase approach, which is tailored to each patient s individual needs.  The cardiologist, nurse practitioner, physician assistant and nurses provide an ongoing outpatient care and treatment plan that guides heart failure and cardiomyopathy patients from evaluation and education to stabilization. This team works with your current primary care doctor and cardiologist to help you:    - Avoid hospitalizations  - Slow the progression of the disease  - Improve length and quality of life  - Know who and when to call if heart failure symptoms appear  - Receive easy access to quality health care and advice  - Better understand your condition and treatment  - Decrease the tremendous cost burden of heart failure care  - Detect future heart problems before they become life threatening                                 Follow-up Appointments     ___ An appointment with the [facility].O.R.E. Clinic may already have been made for you (see section   Your next appointments already scheduled ).  If you have a question about your appointment, would like to speak with a C.O.R.E. nurse, or would like to become a [facility].O.R.E. Clinic patient, please call one of the following locations:     - [facility]):                                             [phone]  - [facility]):                                            [phone]  - [facility] ([place]):                 [phone]      ___ Your C.O.R.E. Clinic Team will continue to educate you on your heart failure and may adjust medications based on your vital signs, lab work, and how you are feeling.  Therefore, it is very important to bring the following to all C.O.R.E. appointments:    - An  "accurate list of your medications  - Your medicine bottles  - Your weight chart/calendar                   Pending Results     Date and Time Order Name Status Description    2017 CBC with platelets In process     2017 Basic metabolic panel In process             Statement of Approval     Ordered          10/08/17 1013  I have reviewed and agree with all the recommendations and orders detailed in this document.  EFFECTIVE NOW     Approved and electronically signed by:  Janiya Niño MD             Admission Information     Date & Time Provider Department Dept. Phone    2017 Janiya Niño MD [address] [facility] [phone]      Your Vitals Were     Blood Pressure Pulse Temperature Respirations Height Weight    92/73 (BP Location: Right arm) 85 97.2  F (36.2  C) (Oral) 16 1.803 m (5' 11\") 184 kg (405 lb 10.3 oz)    Pulse Oximetry BMI (Body Mass Index)                95% 56.58 kg/m2          MyChart Information     [facility] lets you send messages to your doctor, view your test results, renew your prescriptions, schedule appointments and more. To sign up, go to [email or web address] . Click on \"Log in\" on the left side of the screen, which will take you to the Welcome page. Then click on \"Sign up Now\" on the right side of the page.     You will be asked to enter the access code listed below, as well as some personal information. Please follow the directions to create your username and password.     Your access code is: [email or web address]  Expires: 2017  2:53 PM     Your access code will  in 90 days. If you need help or a new code, please call your [facility] clinic or [phone].        Care EveryWhere ID     This is your Care EveryWhere ID. This could be used by other organizations to access your [facility] medical records  [id number]        Equal Access to Services     TYLER LESLIE: Felisa Cotton, shelton gutierrez, myron banda, vasyl cowan " shelbi laNixonaan ah. So [facility] [phone].    ATENCIÓN: Si ernestinela mel, tiene a kim disposición servicios gratuitos de asistencia lingüística. Alexis al [phone].    We comply with applicable federal civil rights laws and [place] laws. We do not discriminate on the basis of race, color, national origin, age, disability, sex, sexual orientation, or gender identity.               Review of your medicines      START taking        Dose / Directions    diltiazem 30 MG tablet   Commonly known as:  CARDIZEM        Dose:  30 mg   Take 1 tablet (30 mg) by mouth 2 times daily   Quantity:  60 tablet   Refills:  3       furosemide 80 MG tablet   Commonly known as:  LASIX   Used for:  Hypervolemia, unspecified hypervolemia type        Dose:  80 mg   Take 1 tablet (80 mg) by mouth daily   Quantity:  90 tablet   Refills:  3       ipratropium - albuterol 0.5 mg/2.5 mg/3 mL 0.5-2.5 (3) MG/3ML neb solution   Commonly known as:  DUONEB        Dose:  3 mL   Take 1 vial (3 mLs) by nebulization every 4 hours as needed for wheezing   Quantity:  360 mL   Refills:  3       tamsulosin 0.4 MG capsule   Commonly known as:  FLOMAX   Used for:  Urinary retention        Dose:  0.4 mg   Take 1 capsule (0.4 mg) by mouth daily   Quantity:  60 capsule   Refills:  1       warfarin 1 MG tablet   Commonly known as:  COUMADIN        Dose:  9 mg   Take 9 tablets (9 mg) by mouth daily   Quantity:  30 tablet   Refills:  3         CONTINUE these medicines which may have CHANGED, or have new prescriptions. If we are uncertain of the size of tablets/capsules you have at home, strength may be listed as something that might have changed.        Dose / Directions    metoprolol 100 MG 24 hr tablet   Commonly known as:  TOPROL-XL   This may have changed:  how much to take   Used for:  Essential hypertension        Dose:  200 mg   Take 2 tablets (200 mg) by mouth daily   Quantity:  30 tablet   Refills:  12         CONTINUE these medicines which have NOT CHANGED         Dose / Directions    aspirin 81 MG tablet   Used for:  Essential hypertension        Dose:  81 mg   Take 1 tablet (81 mg) by mouth daily   Quantity:  30 tablet   Refills:  12       atorvastatin 40 MG tablet   Commonly known as:  LIPITOR   Used for:  Type 2 diabetes mellitus without complication, without long-term current use of insulin (H)        Dose:  40 mg   Take 1 tablet (40 mg) by mouth daily   Quantity:  30 tablet   Refills:  11       levothyroxine 150 MCG tablet   Commonly known as:  SYNTHROID/LEVOTHROID   Used for:  Hypothyroidism, unspecified type        Dose:  150 mcg   Take 1 tablet (150 mcg) by mouth daily   Quantity:  30 tablet   Refills:  11       lisinopril 10 MG tablet   Commonly known as:  PRINIVIL/ZESTRIL   Used for:  Essential hypertension with goal blood pressure less than 140/90        Dose:  10 mg   Take 1 tablet (10 mg) by mouth daily   Quantity:  30 tablet   Refills:  12       metFORMIN 500 MG tablet   Commonly known as:  GLUCOPHAGE   Used for:  Type 2 diabetes mellitus without complication, without long-term current use of insulin (H)        Dose:  500 mg   Take 1 tablet (500 mg) by mouth 2 times daily (with meals)   Quantity:  60 tablet   Refills:  11       Multi-vitamin Tabs tablet        Dose:  1 tablet   Take 1 tablet by mouth daily   Refills:  0       omega 3 1000 MG Caps   Used for:  Hyperlipidemia LDL goal <100        Dose:  1 g   Take 1 g by mouth daily   Quantity:  90 capsule   Refills:  3         STOP taking     hydrochlorothiazide 25 MG tablet   Commonly known as:  HYDRODIURIL                Where to get your medicines      Some of these will need a paper prescription and others can be bought over the counter. Ask your nurse if you have questions.     You don't need a prescription for these medications     diltiazem 30 MG tablet    furosemide 80 MG tablet    ipratropium - albuterol 0.5 mg/2.5 mg/3 mL 0.5-2.5 (3) MG/3ML neb solution    metoprolol 100 MG 24 hr tablet     tamsulosin 0.4 MG capsule    warfarin 1 MG tablet                Protect others around you: Learn how to safely use, store and throw away your medicines at www.disposemymeds.org.             Medication List: This is a list of all your medications and when to take them. Check marks below indicate your daily home schedule. Keep this list as a reference.      Medications           Morning Afternoon Evening Bedtime As Needed    aspirin 81 MG tablet   Take 1 tablet (81 mg) by mouth daily                                atorvastatin 40 MG tablet   Commonly known as:  LIPITOR   Take 1 tablet (40 mg) by mouth daily   Last time this was given:  40 mg on 10/8/2017  8:49 AM                                diltiazem 30 MG tablet   Commonly known as:  CARDIZEM   Take 1 tablet (30 mg) by mouth 2 times daily   Last time this was given:  30 mg on 10/8/2017  8:54 AM                                furosemide 80 MG tablet   Commonly known as:  LASIX   Take 1 tablet (80 mg) by mouth daily   Last time this was given:  80 mg on 10/8/2017  8:57 AM                                ipratropium - albuterol 0.5 mg/2.5 mg/3 mL 0.5-2.5 (3) MG/3ML neb solution   Commonly known as:  DUONEB   Take 1 vial (3 mLs) by nebulization every 4 hours as needed for wheezing   Last time this was given:  3 mLs on 9/28/2017  8:34 PM                                levothyroxine 150 MCG tablet   Commonly known as:  SYNTHROID/LEVOTHROID   Take 1 tablet (150 mcg) by mouth daily   Last time this was given:  150 mcg on 10/8/2017  8:50 AM                                lisinopril 10 MG tablet   Commonly known as:  PRINIVIL/ZESTRIL   Take 1 tablet (10 mg) by mouth daily   Last time this was given:  10 mg on 10/8/2017  8:52 AM                                metFORMIN 500 MG tablet   Commonly known as:  GLUCOPHAGE   Take 1 tablet (500 mg) by mouth 2 times daily (with meals)                                metoprolol 100 MG 24 hr tablet   Commonly known as:  TOPROL-XL   Take  2 tablets (200 mg) by mouth daily   Last time this was given:  200 mg on 10/8/2017  8:51 AM                                Multi-vitamin Tabs tablet   Take 1 tablet by mouth daily   Last time this was given:  1 tablet on 10/8/2017  8:55 AM                                omega 3 1000 MG Caps   Take 1 g by mouth daily   Last time this was given:  1 g on 10/8/2017  8:48 AM                                tamsulosin 0.4 MG capsule   Commonly known as:  FLOMAX   Take 1 capsule (0.4 mg) by mouth daily   Last time this was given:  0.4 mg on 10/8/2017  8:55 AM                                warfarin 1 MG tablet   Commonly known as:  COUMADIN   Take 9 tablets (9 mg) by mouth daily   Last time this was given:  9 mg on 10/7/2017  6:23 PM

## 2017-09-23 NOTE — ED PROVIDER NOTES
"  History     Chief Complaint   Patient presents with     Chest Pain     Fall     HPI  Clarke Moreira is a 67 year old male with a history of MI x2 and morbid obesity presents to the ED via EMS for evaluation of chest pain and fall.  Patient states he was sitting in his chair and he tried to get up but fell on his right knee and then could not stand. He also reports chest pain that started 30 minutes after falling and trying to get up. Patient describes his left-sided chest pain as a \"dull ache\".He was able to call the security personnel at his apartment who called 911. Patient denies shortness of breath. EMS gave him 4 baby ASA and 1 dose nitro without change in pain. Patient denies loss of consciousness or head trauma. Denies nausea or vomiting. Denies shortness of breath. Denies fevers, chills, or any cold-like symptoms.     I have reviewed the Medications, Allergies, Past Medical and Surgical History, and Social History in the [employer] system.    Past Medical History:   Diagnosis Date     Basal cell carcinoma        Past Surgical History:   Procedure Laterality Date     BIOPSY OF SKIN LESION       MOHS MICROGRAPHIC PROCEDURE         Family History   Problem Relation Age of Onset     CANCER No family hx of      No family history of skin cancer       Social History   Substance Use Topics     Smoking status: Never Smoker     Smokeless tobacco: Never Used     Alcohol use Not on file       Current Facility-Administered Medications   Medication     0.9% sodium chloride BOLUS     metoprolol (LOPRESSOR) injection 5 mg     Current Outpatient Prescriptions   Medication     multivitamin, therapeutic with minerals (MULTI-VITAMIN) TABS tablet     aspirin 81 MG tablet     atorvastatin (LIPITOR) 40 MG tablet     hydrochlorothiazide (HYDRODIURIL) 25 MG tablet     levothyroxine (SYNTHROID/LEVOTHROID) 150 MCG tablet     lisinopril (PRINIVIL/ZESTRIL) 10 MG tablet     metFORMIN (GLUCOPHAGE) 500 MG tablet     metoprolol (TOPROL-XL) " 100 MG 24 hr tablet     omega 3 1000 MG CAPS     ammonium lactate (LAC-HYDRIN) 12 % cream     Omega-3 Fatty Acids (OMEGA-3 FISH OIL PO)        No Known Allergies    Review of Systems   Constitutional: Negative for chills and fever.   Respiratory: Negative for shortness of breath.    Cardiovascular: Positive for chest pain.   Gastrointestinal: Negative for abdominal pain, blood in stool, constipation, nausea and vomiting.   Endocrine: Negative for polydipsia and polyphagia.   Genitourinary: Negative for dysuria.   Neurological: Negative for headaches.   All other systems reviewed and are negative.      Physical Exam   BP: 100/66  Heart Rate: 130  Temp: 98.8  F (37.1  C)  Resp: 20  Weight: (!) 205.2 kg (452 lb 6.1 oz)  SpO2: (!) 70 %  Physical Exam   Constitutional: He is oriented to person, place, and time. He appears distressed.   HENT:   Head: Normocephalic and atraumatic.   Right Ear: External ear normal.   Left Ear: External ear normal.   Mouth/Throat: Oropharynx is clear and moist.   Eyes: Conjunctivae are normal. Pupils are equal, round, and reactive to light.   Neck: Normal range of motion.   Cardiovascular: Normal pulses.  An irregularly irregular rhythm present. Tachycardia present.    Pulmonary/Chest: Effort normal. No stridor. Tachypnea noted. No respiratory distress.   Abdominal: Soft. He exhibits no distension. There is no tenderness. There is no rebound.   Neurological: He is alert and oriented to person, place, and time.   Skin: Skin is warm and dry. Rash noted. He is not diaphoretic.        Psychiatric: He has a normal mood and affect. His behavior is normal.       ED Course     ED Course     Procedures             EKG Interpretation:      Interpreted by Joel Lyons  Time reviewed: 1030   Symptoms at time of EKG: weakness   Rhythm: irregularly irregular  Rate: tachycardia  Axis: normal  Ectopy: none  Conduction: normal  ST Segments/ T Waves: No ST-T wave changes  Q Waves: none  Comparison to  prior: Unchanged    Clinical Impression: abnormal EKG    Critical Care time:  was 30 minutes for this patient excluding procedures.     Labs Ordered and Resulted from Time of ED Arrival Up to the Time of Departure from the ED   CBC WITH PLATELETS DIFFERENTIAL   COMPREHENSIVE METABOLIC PANEL   TROPONIN I   TSH   MAGNESIUM   D DIMER QUANTITATIVE   ISTAT TROPONIN NURSING POCT   TROPONIN POCT            Assessments & Plan (with Medical Decision Making)   67-year-old type II diabetic arriving to the Emergency Department today via EMS due to concern of fall with associated chest discomfort.  Upon arrival, the patient is noted to be alert and afebrile. He is tachycardic with an irregular rhythm with rates in the 1 teens to 130s. EKG does confirm atrial fibrillation with RVR. I do not appreciate signs of strain or ischemic type pattern, QRS morphology consistent with previous EKGs. Otherwise the patient was reportedly having mild chest discomfort on the left and known CAD i-STAT troponin negative. With onset of symptoms approximately half an hour ago occurring after falling, I do believe he ll require a formal troponin and serial cardiac biomarkers under observation status. Further the patient has had hypoxia which appears to be new and is currently speaking in full sentences without evidence of increased work of breathing at baseline however upon arrival, the patient noted to have a CO2 low 70s requiring supplemental oxygen. As I take off his nasal cannula at bedside, the patient is quickly become hypoxic to low 80s with no exertion at rest. Etiology is unclear if this time. He reports no cough, cold-like symptoms, or lower respiratory disease. We will plan to obtain chest x-ray. With associated fall I would obtain a chest CT although clinical suspicion for pulmonary embolism at this time is low.  This part of the document was transcribed by Susan Saenz Medical Scribe.     Addendum:   Ultimately the patient did have a  slight elevation in d-dimer prompting CT scan of the chest which demonstrated no pulmonary embolus, pneumothorax, or aortic pathology.  There is note of pulmonary edema, I suspect is consistent with the degree of failure in the setting of A. fib with RVR. Rates do continue to range between 100 to 130.  He s been given metoprolol and a dose of Lasix in the emergency department. With concern of inability to safely care for himself at home, I think initial gentle diuresis will need to be managed as an inpatient. The patient is agreement with this plan.     I have reviewed the nursing notes.    I have reviewed the findings, diagnosis, plan and need for follow up with the patient.    New Prescriptions    No medications on file       Final diagnoses:   Generalized muscle weakness   ISusan, am serving as a trained medical scribe to document services personally performed by Joel Lyons MD, based on the provider's statements to me.    Joel RO MD, was physically present and have reviewed and verified the accuracy of this note documented by Susan Saenz.     9/23/2017   [facility], [place], EMERGENCY DEPARTMENT     Joel Lyons MD  09/23/17 [id number]

## 2017-09-23 NOTE — H&P
"[facility], [place]    Internal Medicine History and Physical - [facility] Service       Date of Admission:  9/23/2017    Chief Complaint   Fall, chest pain, shortness of breath    History is obtained from the patient    History of Present Illness   Clarke Moreira is a 67 year old male  who has a history of morbid obesity, diabetes, possible CHF and is admitted for fall and subsequent chest pain and shortness of breath.  He fell while getting out of his chair, saying he \"did not have enough strength to keep my balance\".  He had no prodromal symptoms before falling, such as dizziness, shortness of breath, chest pain, vision changes, etc.  No loss of consciousness during the episode, no urinary incontinence, no tongue biting or shaking.  No post-fall confusion.  Was not able to get up by himself after the fall due to leg weakness. He called security who called 911.    Approximately 30 minutes after the fall he developed sudden onset chest pain and shortness of breath.  The chest pain was in hs mid-left chest and is described as achy and initially sharp.  It was not worse with movement, only with anxiety.  He didn't remember if the shortness of breath was sudden onset, but describes it as a gasping for air. Nothing made these worse (except the anxiety) or better.    He also has some pain in his legs; the right calf he describes as a mild pinching pain (where ulcer is located).  He has also had some mild back pain since he was moved to [facility].  The back pain occurred suddenly \"across my rear\" from the right to left.  The leg, back, and chest pain have gotten better.    Of note, he has had another fall this week in a similar manner (minus the chest pain and shortness of breath).  He was, however, able to get up so did not seek medical services.  He notes that he has had a few falls in the last five years.  He associates these with renovates in the apartment including vinyl fela being " installed which is slippery.  He also has weeping from the legs, which makes the floor more slippery.  He has also had some falls related to the bathtub, therefore he uses a towel rather than showering    Review of Systems   The 10 point Review of Systems is negative other than noted in the HPI or here.   Fatigue for a few weeks  Confusion and feeling muddled for a few months  He describes occasionally waking up at night gasping for breath (PND)    Past Medical History    I have reviewed this patient's medical history and updated it with pertinent information if needed.   Past Medical History:   Diagnosis Date     Basal cell carcinoma     T2DM, non insulin dependent, HbA1c 6.5% (August)  Atrial fibrillation  Morbid obesity  PTSD, depression, panic attacks  Joint pain  Hypothyroidism  Hyperlipidemia  Possible congestive heart failure  States has had previous heart attacks    Past Surgical History   I have reviewed this patient's surgical history and updated it with pertinent information if needed.  Past Surgical History:   Procedure Laterality Date     BIOPSY OF SKIN LESION       MOHS MICROGRAPHIC PROCEDURE         Social History   Social History   Substance Use Topics     Smoking status: Never Smoker     Smokeless tobacco: Never Used     Alcohol use No      Comment: Former alcohol abuse. Quit 70s then quit later in 80s-present   Lives in apartment [facility] in [place] [address] with elevator.  Doesn't often go out of apartment and often stays in chair except to use the bathroom and go to the kitchen for food.  Not able to clean. Gets food and other necessities from food delivery services.    Family History   I have reviewed this patient's family history and updated it with pertinent information if needed.   Family History   Problem Relation Age of Onset     CANCER No family hx of      No family history of skin cancer   Strong family history of mental health conditions, no diabetes, not sure on other  conditions as he was not close to his family and states they are all dead.    Prior to Admission Medications   Prior to Admission Medications   Prescriptions Last Dose Informant Patient Reported? Taking?   Omega-3 Fatty Acids (OMEGA-3 FISH OIL PO)   Yes No   Sig: Take 1,000 mg by mouth daily   ammonium lactate (LAC-HYDRIN) 12 % cream   No No   Sig: Apply topically 2 times daily as needed for dry skin   Patient not taking: Reported on 8/14/2017   aspirin 81 MG tablet   No No   Sig: Take 1 tablet (81 mg) by mouth daily   atorvastatin (LIPITOR) 40 MG tablet   No No   Sig: Take 1 tablet (40 mg) by mouth daily   hydrochlorothiazide (HYDRODIURIL) 25 MG tablet   No No   Sig: Take 1 tablet (25 mg) by mouth daily   levothyroxine (SYNTHROID/LEVOTHROID) 150 MCG tablet   No No   Sig: Take 1 tablet (150 mcg) by mouth daily   lisinopril (PRINIVIL/ZESTRIL) 10 MG tablet   No No   Sig: Take 1 tablet (10 mg) by mouth daily   metFORMIN (GLUCOPHAGE) 500 MG tablet   No No   Sig: Take 1 tablet (500 mg) by mouth 2 times daily (with meals)   metoprolol (TOPROL-XL) 100 MG 24 hr tablet   No No   Sig: Take 1 tablet (100 mg) by mouth daily   multivitamin, therapeutic with minerals (MULTI-VITAMIN) TABS tablet   Yes No   Sig: Take 1 tablet by mouth daily   omega 3 1000 MG CAPS   No No   Sig: Take 1 g by mouth daily      Facility-Administered Medications: None     Allergies   No Known Allergies    Physical Exam   Vital Signs: Temp: 95.9  F (35.5  C) Temp src: Oral BP: 113/63   Heart Rate: 108 Resp: 20 SpO2: 95 % O2 Device: Nasal cannula Oxygen Delivery: 2.5 LPM  Weight: 460 lbs 11.2 oz    General Appearance: slightly unkempt, non-toxic, occasionally shivering, morbidly obese  Eyes: CN II, III, IV, VI intact, no injection, no exophthalmos  HEENT: NC/AT, no lympahdenopathy, mild pharnygeal erythema, no tonsillar exudates, no obvious oral ulcers, no obvious thyromegaly or thyroid nodules  Respiratory: Chest clear anteriorly, no fingernail  clubbing  Cardiovascular: Hands cool to touch, HS I+II+0, JVP not visible 2/2 soft tissue  GI: Enlarged, no percussable organomegaly. BS present  Lymph/Hematologic: No petechiae or ecchymoses  Genitourinary: No rashes, no scrotal edema, no current discharge.    Skin: Lower limbs: lymphedema with cracking and peeling skin, serous exudate leaking, erythematous, ulcers on base of increased erythema warmer than contralateral side, large cut dorsum L foot on more erythematous base  Musculoskeletal: Symmetrical muscle bulk, considerable weakness  Neurologic: CNs II-XII normal. Toure's and Rinne's, gag reflex, chin jerk, corneal reflex not tested. Power 4+/5 bilateral upper limbs.  Power 2/5 bilateral quads. CAn't bend more than a few degrees at knees. No sensation to light touch legs up to approx 2-3 inches below knees.  Sensation normal upper limbs.  Psychiatric: Appreciate, thankful.  Appears depressed and to have low self-esteem    Assessment & Plan   Clarke Moreira is a 67 year old male admitted on 9/23/2017. He has a history of T2DM, previous falls and is admitted for fall and chest pain and shortness of breath    # Fall  No prodromal symptoms, no LOC, no post-ictal state. Likely mechanical fall, contributed by muscle weakness and deconditioning, slippery floor and weeping from legs.  -CK  -PT/OT  -SW consult  - Repeat troponin    # Atypical chest pain  # Shortness of breath  Trop negative.  CXR-bilateral opacities. CT negative for PE. ECG-A fib without ischemic changes. Musculoskeletal vs rhabdo from prolonged lay vs cardiac failure vs ischemia.  Ischemia less likely despite positive medical history as not typical, negative trop and ECG  - Respiratory viruses  - Cardiac investigations as below  - Titrate down oxygen    #Leg ulcers and cellulitis without sepsis  No signs sepsis but clinically appears infected given erythema and warmth.  - Cefalexin  - WOC consult  - Lymphedema consult  - Lactate if triggers  (currently only elevated heart rate)    # Atrial fibrillation with RVR  # Suggestion of pulmonary hypertension on CT  # Pleural effusions  CT suggestive of pulmonary hypertension. Previous echo in 2008. Patient mentioned he had previous MIs.  - Echo for pulmonary hypertension, cardiac failure/regional walls  - Furosemide if cardiac failure    # Hyponatremia  Na 125. Likely hypervolemic due to presumed cardiac failure. No current symptoms of hyponatremia although he notes confusion for a few months-not previously investigated  - Urine studies, osmolarities  - Stop hydrochlorothiazide  - Fluid restriction; 1500 for now then after urine studies can base on calculations  - Trend    # Depression  Lack of self-care, low self esteem. Depression since teens, has therapist.  Limited social support. Pt declined therapy here in hospital or chaplaincy visit. Not on antidepressant  - Suggest antidepressant trial. To discuss with patient pamela    Diet: Combination Diet Low Saturated Fat Na <2400mg Diet, No Caffeine Diet  Fluid restriction 1500 ML FLUID  Fluids: none  DVT Prophylaxis: Enoxaparin (Lovenox) SQ  Code Status: DNR / DNI    Disposition Plan   Expected discharge: 2 - 3 days; recommended to TCU once cellulitis treated, pulmonary hypertension investigated, lymphedema treated, PT/OT have seen.     Entered: Kayla Lee 09/23/2017, 6:12 PM   Information in the above section will display in the discharge planner report.    The patient was discussed with Dr. AKANKSHA Lee  [address]   Pager: [phone]  Please see sticky note for cross cover information      Data   Data     Recent Labs  Lab 09/23/17  1049 09/23/17  1043   WBC 10.8  --    HGB 17.2  --    MCV 94  --      --    *  --    POTASSIUM 3.8  --    CHLORIDE 87*  --    CO2 29  --    BUN 10  --    CR 0.77  --    ANIONGAP 10  --    CHERI 8.2*  --    *  --    ALBUMIN 2.6*  --    PROTTOTAL 5.6*  --     BILITOTAL 1.0  --    ALKPHOS 70  --    ALT 21  --    AST 17  --    TROPI 0.015  --    TROPONIN  --  0.00

## 2017-09-23 NOTE — IP AVS SNAPSHOT
` ` Patient Information     Patient Name Sex Clarke Noland ([id number]) Male 1950       Room Bed    5208 5208-01      Patient Demographics     Address Phone    [address]  [facility] [address] [phone] (Home)      Patient Ethnicity & Race     Ethnic Group Patient Race    American White      Emergency Contact(s)     Name Relation Home Work Mobile    Janice Hillman Friend   [phone]    DENIS CLINE Friend [phone]      Documents on File        Status Date Received Description       Documents for the Patient    Privacy Notice - Beulah Received 11     Insurance Card  () 03     Face Sheet  () 03     External Medication Information Consent Accepted 16     Patient ID Received 16 [place]    Consent for Services - Hospital/Clinic Received () 11     Consent for Services - [facility] Received 13     Consent for EHR Access Received 14     Consent for Services - [facility]  13 GENERAL CONSENT FORM: SHARED EHR - ENGLISH, 2013    [facility] Specified Other       Consent for Services - Hospital/Clinic Received () 14     Consent for EHR Access  14 [facility] WITH NON-Mobile HEALTH CARE PROVIDERS    Consent for Services - Hospital/Clinic  () 14 CONSENT FOR SERVICE    HIM [facility]IMA Authorization - File Only  09/15/14 KATE[facility]S    Consent for Services/Privacy Notice - Hospital/Clinic Received () 16     Insurance Card Received 16 [facility]       Documents for the Encounter    CMS IM for Patient Signature Received 17     Photograph   WOC 17 right heel     Monitoring Device Output  10/04/17 INITIAL MONITORING STRIPS    Monitoring Device Output  10/04/17 MONITORING STRIPS SHEET 2      Admission Information     Attending Provider Admitting Provider Admission Type Admission Date/Time    Janiya Niño MD [place],  MD [facility] 09/23/17  1025    Discharge Date Hospital Service Auth/Cert Status Service Area     Internal Medicine Incomplete [facility]    Unit Room/Bed Admission Status       UU U5A 5208/5208-01 Admission (Confirmed)       Admission     Complaint    Fall from chair, initial encounter, SOB (shortness of breath)      Hospital Account     Name Acct ID Class Status Primary Coverage    Clarke Moreira [id number] Inpatient Open UCARE - [employer] FOR [employer]            Guarantor Account (for Hospital Account #[id number])     Name Relation to Pt Service Area Active? Acct Type    Clarke Moreira  FCS Yes Personal/Family    Address Phone          [address]  [place] [address] [phone](H)              Coverage Information (for Hospital Account #[id number])     F/O Payor/Plan Precert #    UCARE/[employer]ARE FOR SENIORS     Subscriber Subscriber #    Clarke Moreira [id number]    Address Phone    [address]  [place] [address] [phone]

## 2017-09-23 NOTE — ED NOTES
Clarke presents by ambulance from home with c/o fall and chest pain. He states he was sitting in his chair and he tried to get up but fell on his right knee and then could not stand. He also reports chest pain that started 30 minutes after falling and trying to get up. He was able to call the security personnel at his apartment who called 911. History of MI x2 and morbid obesity. Satting in the 70s on RA. Denies SOB. Alert and oriented. EMS noted oozing leg wounds and skin breakdown on his bottom. EMS gave him 4 baby ASA and 1 dose nitro without change in pain.

## 2017-09-24 ENCOUNTER — APPOINTMENT (OUTPATIENT)
Dept: GENERAL RADIOLOGY | Facility: CLINIC | Age: 67
DRG: 291 | End: 2017-09-24
Payer: COMMERCIAL

## 2017-09-24 ENCOUNTER — APPOINTMENT (OUTPATIENT)
Dept: ULTRASOUND IMAGING | Facility: CLINIC | Age: 67
DRG: 291 | End: 2017-09-24
Attending: INTERNAL MEDICINE
Payer: COMMERCIAL

## 2017-09-24 ENCOUNTER — APPOINTMENT (OUTPATIENT)
Dept: GENERAL RADIOLOGY | Facility: CLINIC | Age: 67
DRG: 291 | End: 2017-09-24
Attending: INTERNAL MEDICINE
Payer: COMMERCIAL

## 2017-09-24 ENCOUNTER — APPOINTMENT (OUTPATIENT)
Dept: CARDIOLOGY | Facility: CLINIC | Age: 67
DRG: 291 | End: 2017-09-24
Payer: COMMERCIAL

## 2017-09-24 LAB
ANION GAP SERPL CALCULATED.3IONS-SCNC: 15 MMOL/L (ref 3–14)
BASE DEFICIT BLDA-SCNC: NORMAL MMOL/L
BASE DEFICIT BLDV-SCNC: NORMAL MMOL/L
BASE EXCESS BLDA CALC-SCNC: 2.6 MMOL/L
BASE EXCESS BLDA CALC-SCNC: 3.6 MMOL/L
BASE EXCESS BLDA CALC-SCNC: NORMAL MMOL/L
BASE EXCESS BLDV CALC-SCNC: 3 MMOL/L
BASE EXCESS BLDV CALC-SCNC: 3.6 MMOL/L
BASE EXCESS BLDV CALC-SCNC: 6.2 MMOL/L
BASE EXCESS BLDV CALC-SCNC: 7.6 MMOL/L
BASE EXCESS BLDV CALC-SCNC: 7.6 MMOL/L
BASE EXCESS BLDV CALC-SCNC: NORMAL MMOL/L
BUN SERPL-MCNC: 11 MG/DL (ref 7–30)
CALCIUM SERPL-MCNC: 8.4 MG/DL (ref 8.5–10.1)
CHLORIDE SERPL-SCNC: 89 MMOL/L (ref 94–109)
CO2 SERPL-SCNC: 26 MMOL/L (ref 20–32)
CREAT SERPL-MCNC: 1.02 MG/DL (ref 0.66–1.25)
ERYTHROCYTE [DISTWIDTH] IN BLOOD BY AUTOMATED COUNT: 13.9 % (ref 10–15)
ERYTHROCYTE [DISTWIDTH] IN BLOOD BY AUTOMATED COUNT: 14.1 % (ref 10–15)
ERYTHROCYTE [DISTWIDTH] IN BLOOD BY AUTOMATED COUNT: NORMAL % (ref 10–15)
FLUAV H1 2009 PAND RNA SPEC QL NAA+PROBE: NEGATIVE
FLUAV H1 RNA SPEC QL NAA+PROBE: NEGATIVE
FLUAV H3 RNA SPEC QL NAA+PROBE: NEGATIVE
FLUAV RNA SPEC QL NAA+PROBE: NEGATIVE
FLUBV RNA SPEC QL NAA+PROBE: NEGATIVE
GFR SERPL CREATININE-BSD FRML MDRD: 73 ML/MIN/1.7M2
GLUCOSE SERPL-MCNC: 135 MG/DL (ref 70–99)
HADV DNA SPEC QL NAA+PROBE: NEGATIVE
HADV DNA SPEC QL NAA+PROBE: NEGATIVE
HCO3 BLD-SCNC: 32 MMOL/L (ref 21–28)
HCO3 BLD-SCNC: 38 MMOL/L (ref 21–28)
HCO3 BLD-SCNC: NORMAL MMOL/L (ref 21–28)
HCO3 BLDV-SCNC: 37 MMOL/L (ref 21–28)
HCO3 BLDV-SCNC: 37 MMOL/L (ref 21–28)
HCO3 BLDV-SCNC: 38 MMOL/L (ref 21–28)
HCO3 BLDV-SCNC: 39 MMOL/L (ref 21–28)
HCO3 BLDV-SCNC: 40 MMOL/L (ref 21–28)
HCO3 BLDV-SCNC: NORMAL MMOL/L (ref 21–28)
HCT VFR BLD AUTO: 50.5 % (ref 40–53)
HCT VFR BLD AUTO: 57.7 % (ref 40–53)
HCT VFR BLD AUTO: NORMAL % (ref 40–53)
HGB BLD-MCNC: 16.1 G/DL (ref 13.3–17.7)
HGB BLD-MCNC: 18.6 G/DL (ref 13.3–17.7)
HGB BLD-MCNC: NORMAL G/DL (ref 13.3–17.7)
HMPV RNA SPEC QL NAA+PROBE: NEGATIVE
HPIV1 RNA SPEC QL NAA+PROBE: NEGATIVE
HPIV2 RNA SPEC QL NAA+PROBE: NEGATIVE
HPIV3 RNA SPEC QL NAA+PROBE: NEGATIVE
LACTATE BLD-SCNC: 0.8 MMOL/L (ref 0.7–2)
MCH RBC QN AUTO: 30.6 PG (ref 26.5–33)
MCH RBC QN AUTO: 31.6 PG (ref 26.5–33)
MCH RBC QN AUTO: NORMAL PG (ref 26.5–33)
MCHC RBC AUTO-ENTMCNC: 31.9 G/DL (ref 31.5–36.5)
MCHC RBC AUTO-ENTMCNC: 32.2 G/DL (ref 31.5–36.5)
MCHC RBC AUTO-ENTMCNC: NORMAL G/DL (ref 31.5–36.5)
MCV RBC AUTO: 96 FL (ref 78–100)
MCV RBC AUTO: 98 FL (ref 78–100)
MCV RBC AUTO: NORMAL FL (ref 78–100)
MICROBIOLOGIST REVIEW: NORMAL
O2/TOTAL GAS SETTING VFR VENT: 100 %
O2/TOTAL GAS SETTING VFR VENT: 21 %
O2/TOTAL GAS SETTING VFR VENT: 40 %
O2/TOTAL GAS SETTING VFR VENT: 60 %
O2/TOTAL GAS SETTING VFR VENT: 60 %
O2/TOTAL GAS SETTING VFR VENT: 80 %
O2/TOTAL GAS SETTING VFR VENT: ABNORMAL %
O2/TOTAL GAS SETTING VFR VENT: NORMAL %
O2/TOTAL GAS SETTING VFR VENT: NORMAL %
OSMOLALITY SERPL: 278 MMOL/KG (ref 280–301)
OXYHGB MFR BLD: NORMAL % (ref 92–100)
OXYHGB MFR BLDV: 84 %
PCO2 BLD: 112 MM HG (ref 35–45)
PCO2 BLD: 66 MM HG (ref 35–45)
PCO2 BLD: NORMAL MM HG (ref 35–45)
PCO2 BLDV: 106 MM HG (ref 40–50)
PCO2 BLDV: 109 MM HG (ref 40–50)
PCO2 BLDV: 113 MM HG (ref 40–50)
PCO2 BLDV: 74 MM HG (ref 40–50)
PCO2 BLDV: 85 MM HG (ref 40–50)
PCO2 BLDV: NORMAL MM HG (ref 40–50)
PH BLD: 7.14 PH (ref 7.35–7.45)
PH BLD: 7.29 PH (ref 7.35–7.45)
PH BLD: NORMAL PH (ref 7.35–7.45)
PH BLDV: 7.14 PH (ref 7.32–7.43)
PH BLDV: 7.15 PH (ref 7.32–7.43)
PH BLDV: 7.17 PH (ref 7.32–7.43)
PH BLDV: 7.27 PH (ref 7.32–7.43)
PH BLDV: 7.31 PH (ref 7.32–7.43)
PH BLDV: NORMAL PH (ref 7.32–7.43)
PLATELET # BLD AUTO: 163 10E9/L (ref 150–450)
PLATELET # BLD AUTO: 169 10E9/L (ref 150–450)
PLATELET # BLD AUTO: NORMAL 10E9/L (ref 150–450)
PO2 BLD: 122 MM HG (ref 80–105)
PO2 BLD: 58 MM HG (ref 80–105)
PO2 BLD: NORMAL MM HG (ref 80–105)
PO2 BLDV: 47 MM HG (ref 25–47)
PO2 BLDV: 50 MM HG (ref 25–47)
PO2 BLDV: 57 MM HG (ref 25–47)
PO2 BLDV: 58 MM HG (ref 25–47)
PO2 BLDV: 90 MM HG (ref 25–47)
PO2 BLDV: NORMAL MM HG (ref 25–47)
POTASSIUM SERPL-SCNC: 4 MMOL/L (ref 3.4–5.3)
RBC # BLD AUTO: 5.26 10E12/L (ref 4.4–5.9)
RBC # BLD AUTO: 5.89 10E12/L (ref 4.4–5.9)
RBC # BLD AUTO: NORMAL 10E12/L (ref 4.4–5.9)
RHINOVIRUS RNA SPEC QL NAA+PROBE: NEGATIVE
RSV RNA SPEC QL NAA+PROBE: NEGATIVE
RSV RNA SPEC QL NAA+PROBE: NEGATIVE
SODIUM SERPL-SCNC: 130 MMOL/L (ref 133–144)
SPECIMEN SOURCE: NORMAL
TROPONIN I SERPL-MCNC: <0.015 UG/L (ref 0–0.04)
WBC # BLD AUTO: 13.2 10E9/L (ref 4–11)
WBC # BLD AUTO: 9.8 10E9/L (ref 4–11)
WBC # BLD AUTO: NORMAL 10E9/L (ref 4–11)

## 2017-09-24 PROCEDURE — 25000128 H RX IP 250 OP 636: Performed by: STUDENT IN AN ORGANIZED HEALTH CARE EDUCATION/TRAINING PROGRAM

## 2017-09-24 PROCEDURE — 20000004 ZZH R&B ICU UMMC

## 2017-09-24 PROCEDURE — 93321 DOPPLER ECHO F-UP/LMTD STD: CPT | Mod: 26 | Performed by: INTERNAL MEDICINE

## 2017-09-24 PROCEDURE — 82803 BLOOD GASES ANY COMBINATION: CPT | Performed by: STUDENT IN AN ORGANIZED HEALTH CARE EDUCATION/TRAINING PROGRAM

## 2017-09-24 PROCEDURE — 25000128 H RX IP 250 OP 636: Performed by: INTERNAL MEDICINE

## 2017-09-24 PROCEDURE — 25000125 ZZHC RX 250: Performed by: INTERNAL MEDICINE

## 2017-09-24 PROCEDURE — 82803 BLOOD GASES ANY COMBINATION: CPT | Performed by: HOSPITALIST

## 2017-09-24 PROCEDURE — 71010 XR CHEST PORT 1 VW: CPT

## 2017-09-24 PROCEDURE — 83930 ASSAY OF BLOOD OSMOLALITY: CPT | Performed by: SURGERY

## 2017-09-24 PROCEDURE — 83605 ASSAY OF LACTIC ACID: CPT | Performed by: STUDENT IN AN ORGANIZED HEALTH CARE EDUCATION/TRAINING PROGRAM

## 2017-09-24 PROCEDURE — 82805 BLOOD GASES W/O2 SATURATION: CPT | Performed by: STUDENT IN AN ORGANIZED HEALTH CARE EDUCATION/TRAINING PROGRAM

## 2017-09-24 PROCEDURE — 99292 CRITICAL CARE ADDL 30 MIN: CPT | Mod: GC | Performed by: INTERNAL MEDICINE

## 2017-09-24 PROCEDURE — 85027 COMPLETE CBC AUTOMATED: CPT | Performed by: INTERNAL MEDICINE

## 2017-09-24 PROCEDURE — 36600 WITHDRAWAL OF ARTERIAL BLOOD: CPT

## 2017-09-24 PROCEDURE — 93925 LOWER EXTREMITY STUDY: CPT

## 2017-09-24 PROCEDURE — 25500064 ZZH RX 255 OP 636: Performed by: INTERNAL MEDICINE

## 2017-09-24 PROCEDURE — 99291 CRITICAL CARE FIRST HOUR: CPT | Performed by: SURGERY

## 2017-09-24 PROCEDURE — 93010 ELECTROCARDIOGRAM REPORT: CPT | Performed by: INTERNAL MEDICINE

## 2017-09-24 PROCEDURE — 25000132 ZZH RX MED GY IP 250 OP 250 PS 637: Performed by: INTERNAL MEDICINE

## 2017-09-24 PROCEDURE — 40000556 ZZH STATISTIC PERIPHERAL IV START W US GUIDANCE

## 2017-09-24 PROCEDURE — 40000986 XR CHEST PORT 1 VW

## 2017-09-24 PROCEDURE — 84484 ASSAY OF TROPONIN QUANT: CPT | Performed by: INTERNAL MEDICINE

## 2017-09-24 PROCEDURE — 40000275 ZZH STATISTIC RCP TIME EA 10 MIN

## 2017-09-24 PROCEDURE — 80048 BASIC METABOLIC PNL TOTAL CA: CPT | Performed by: INTERNAL MEDICINE

## 2017-09-24 PROCEDURE — 93325 DOPPLER ECHO COLOR FLOW MAPG: CPT | Mod: 26 | Performed by: INTERNAL MEDICINE

## 2017-09-24 PROCEDURE — 87633 RESP VIRUS 12-25 TARGETS: CPT | Performed by: INTERNAL MEDICINE

## 2017-09-24 PROCEDURE — 25000128 H RX IP 250 OP 636

## 2017-09-24 PROCEDURE — 94660 CPAP INITIATION&MGMT: CPT

## 2017-09-24 PROCEDURE — 82803 BLOOD GASES ANY COMBINATION: CPT | Performed by: INTERNAL MEDICINE

## 2017-09-24 PROCEDURE — 93308 TTE F-UP OR LMTD: CPT

## 2017-09-24 PROCEDURE — 83930 ASSAY OF BLOOD OSMOLALITY: CPT | Performed by: INTERNAL MEDICINE

## 2017-09-24 PROCEDURE — 25000125 ZZHC RX 250: Performed by: STUDENT IN AN ORGANIZED HEALTH CARE EDUCATION/TRAINING PROGRAM

## 2017-09-24 PROCEDURE — 36415 COLL VENOUS BLD VENIPUNCTURE: CPT | Performed by: INTERNAL MEDICINE

## 2017-09-24 PROCEDURE — 93005 ELECTROCARDIOGRAM TRACING: CPT

## 2017-09-24 PROCEDURE — 3E033XZ INTRODUCTION OF VASOPRESSOR INTO PERIPHERAL VEIN, PERCUTANEOUS APPROACH: ICD-10-PCS | Performed by: SURGERY

## 2017-09-24 PROCEDURE — 93308 TTE F-UP OR LMTD: CPT | Mod: 26 | Performed by: INTERNAL MEDICINE

## 2017-09-24 PROCEDURE — 40000264 ECHO LIMITED WITH LUMASON

## 2017-09-24 PROCEDURE — 82805 BLOOD GASES W/O2 SATURATION: CPT | Performed by: SURGERY

## 2017-09-24 RX ORDER — LORAZEPAM 2 MG/ML
0.5 INJECTION INTRAMUSCULAR ONCE
Status: COMPLETED | OUTPATIENT
Start: 2017-09-24 | End: 2017-09-24

## 2017-09-24 RX ORDER — SODIUM CHLORIDE 9 MG/ML
INJECTION, SOLUTION INTRAVENOUS
Status: DISCONTINUED
Start: 2017-09-24 | End: 2017-09-26 | Stop reason: HOSPADM

## 2017-09-24 RX ORDER — SODIUM CHLORIDE, SODIUM LACTATE, POTASSIUM CHLORIDE, CALCIUM CHLORIDE 600; 310; 30; 20 MG/100ML; MG/100ML; MG/100ML; MG/100ML
INJECTION, SOLUTION INTRAVENOUS
Status: COMPLETED
Start: 2017-09-24 | End: 2017-09-24

## 2017-09-24 RX ORDER — HEPARIN SODIUM,PORCINE 10 UNIT/ML
5-10 VIAL (ML) INTRAVENOUS
Status: DISCONTINUED | OUTPATIENT
Start: 2017-09-24 | End: 2017-10-08 | Stop reason: HOSPADM

## 2017-09-24 RX ORDER — FUROSEMIDE 10 MG/ML
40 INJECTION INTRAMUSCULAR; INTRAVENOUS DAILY
Status: DISCONTINUED | OUTPATIENT
Start: 2017-09-24 | End: 2017-09-25

## 2017-09-24 RX ORDER — CEFAZOLIN SODIUM 2 G/100ML
2 INJECTION, SOLUTION INTRAVENOUS EVERY 8 HOURS
Status: DISCONTINUED | OUTPATIENT
Start: 2017-09-24 | End: 2017-09-27

## 2017-09-24 RX ORDER — LIDOCAINE 40 MG/G
CREAM TOPICAL
Status: DISCONTINUED | OUTPATIENT
Start: 2017-09-24 | End: 2017-10-08 | Stop reason: HOSPADM

## 2017-09-24 RX ORDER — METOPROLOL TARTRATE 1 MG/ML
5 INJECTION, SOLUTION INTRAVENOUS ONCE
Status: DISCONTINUED | OUTPATIENT
Start: 2017-09-24 | End: 2017-09-25 | Stop reason: CLARIF

## 2017-09-24 RX ORDER — HEPARIN SODIUM,PORCINE 10 UNIT/ML
2-5 VIAL (ML) INTRAVENOUS
Status: COMPLETED | OUTPATIENT
Start: 2017-09-24 | End: 2017-09-29

## 2017-09-24 RX ORDER — HEPARIN SODIUM,PORCINE 10 UNIT/ML
5-10 VIAL (ML) INTRAVENOUS EVERY 24 HOURS
Status: DISCONTINUED | OUTPATIENT
Start: 2017-09-24 | End: 2017-10-08 | Stop reason: HOSPADM

## 2017-09-24 RX ADMIN — CEFAZOLIN SODIUM 2 G: 2 INJECTION, SOLUTION INTRAVENOUS at 10:19

## 2017-09-24 RX ADMIN — SODIUM CHLORIDE 500 ML: 9 INJECTION, SOLUTION INTRAVENOUS at 06:26

## 2017-09-24 RX ADMIN — Medication 2 ML: at 08:58

## 2017-09-24 RX ADMIN — SULFUR HEXAFLUORIDE 5 ML: KIT at 09:28

## 2017-09-24 RX ADMIN — SODIUM CHLORIDE, POTASSIUM CHLORIDE, SODIUM LACTATE AND CALCIUM CHLORIDE 1000 ML: 600; 310; 30; 20 INJECTION, SOLUTION INTRAVENOUS at 07:50

## 2017-09-24 RX ADMIN — CEFAZOLIN SODIUM 2 G: 2 INJECTION, SOLUTION INTRAVENOUS at 18:12

## 2017-09-24 RX ADMIN — SODIUM CHLORIDE 500 ML: 9 INJECTION, SOLUTION INTRAVENOUS at 05:35

## 2017-09-24 RX ADMIN — CEPHALEXIN 250 MG: 250 CAPSULE ORAL at 03:57

## 2017-09-24 RX ADMIN — DEXTROSE 0.5 MG/MIN: 5 SOLUTION INTRAVENOUS at 11:39

## 2017-09-24 RX ADMIN — LORAZEPAM 0.5 MG: 2 INJECTION INTRAMUSCULAR; INTRAVENOUS at 18:09

## 2017-09-24 RX ADMIN — NOREPINEPHRINE BITARTRATE 0.03 MCG/KG/MIN: 1 INJECTION INTRAVENOUS at 06:32

## 2017-09-24 RX ADMIN — AMIODARONE HYDROCHLORIDE 150 MG: 1.5 INJECTION, SOLUTION INTRAVENOUS at 06:41

## 2017-09-24 RX ADMIN — FUROSEMIDE 40 MG: 10 INJECTION, SOLUTION INTRAVENOUS at 15:51

## 2017-09-24 RX ADMIN — ENOXAPARIN SODIUM 40 MG: 40 INJECTION SUBCUTANEOUS at 18:16

## 2017-09-24 RX ADMIN — DEXTROSE 1 MG/MIN: 5 SOLUTION INTRAVENOUS at 06:50

## 2017-09-24 NOTE — PLAN OF CARE
"Problem: Patient Care Overview  Goal: Plan of Care/Patient Progress Review  Outcome: Declining  Blood pressure (!) 71/45, temperature 96  F (35.6  C), temperature source Oral, resp. rate 15, height 1.803 m (5' 11\"), weight (!) 209 kg (460 lb 11.2 oz), SpO2 95 %.  Pt was admitted on evening shift for a fall at home and chest pain with dyspnea. Pt is DNR/DNI. Pt started on Keflex for lower extremity wound weeping and erythema. Pt was A/O, calling appropriately. At 0400 with administration of antibiotic, pt noted to be tachycardic with HR in 150-160s, lethargic. MD updated. EKG showed A-fib with RVR. VBGs checked and critical. CO2 critically elevated. Rapid response called. BIPAP started. Vitals with hypotension. NS Bolus started. Decision made to transfer patient to ICU for pressors. Report called to Janice. Pt transferred to .              "

## 2017-09-24 NOTE — PLAN OF CARE
Problem: Patient Care Overview  Goal: Individualization & Mutuality  Pt was brought down from 5A around 0615, started on Levo and Amio, through PIV's, MD aware. 1L of NS infusing. Bipap going at 80%. A fib 90's-130's. MAP goal >60. Only arousable to pain when admitted to unit. Continue to monitor and address changes.

## 2017-09-24 NOTE — PLAN OF CARE
Problem: Patient Care Overview  Goal: Plan of Care/Patient Progress Review  PT-4C- Cancel, pt not appropriate for PT at this time, pt on Bipap and hypotensive.

## 2017-09-24 NOTE — CONSULTS
VASCULAR SURGERY HISTORY AND PHYSICAL    ASSESSMENT/PLAN: Clarke Moreira is a 67-year-old male admitted after fall from sitting. He has chronic venous stasis disease and extensive stasis dermatitis in both legs. There is also concurrent cellulitis in both legs and dorsum of the left foot. Posterior right leg has an 5 cm x 4 cm area of ulceration. Right heel has a 2 cm x 2 cm ulceration grade 3. Arterial duplex revealed arterial disease as well. His right anterior tibial and dorsalis pedis arteries are occluded. Right posterior tibial artery has monophasic waveforms and high resistance flow pattern. Left lower extremity waveforms showed patent vessels to the foot.    - In terms of his arterial disease, he does have some tibial disease on the right with heel ulceration. He doesn't have acute limb ischemia. Please elevated the right leg to keep pressure off his right heel with either heel offloading boot or pillows. Avoid placing the pillow below the ulcer on the right lower leg. Once he recovers from this hospitalization, he can undergo VICK with toe pressure or TCPO2 testing as outpatient.     - For the venous disease, please consult lymphedema nurse for dressing change. Use non-stick dressing like adaptic or xeroform to cover ulcerated area, cover with ABD and wrap with Kerlix. Can start compression dressing with Ace-bandage once the cellulitis resolves.     - Recommend broaden antibiotics coverage for cellulitis. Choice of antibiotics at the discretion of primary team.    Margaret Anderson MD (Alex)  Vascular Surgery Fellow  [phone] (p)      - - - - - - - - - -    CHIEF COMPLAINT: open leg wounds, ? Inability to obtain pulses with Doppler    HISTORY OF PRESENT ILLNESS: Clarke Moreira is a 67-year-old morbidly obese male who presented to [facility] after fall with complaints of chest pain and shortness of breath on 9/23/2017. Per report, the patient fell from a chair and was unable to stand after falling so he  "called 911. In the ED, he was found to be in atrial fibrillation with a rapid ventricular rate which continued after admission. Overnight, he had another episode of a fib with RVR and during workup was found to have hypercapnia and lethargy. He subsequently developed hypotension and was admitted to the ICU due to pressures in the 70s/40s. He was hypoventilating with altered mental status and due to DNR/DNI status was placed on BiPAP with some improvement. His lower extremity exam had been concerning on the floor for cellulitis and he was placed on cephalexin. Team was unable to doppler pulses this morning and the vascular surgery team was called. Pulses were obtained prior to evaluation and vascular surgery team was asked to comment on venous stasis. He states that he has had worse swelling in his legs than normal and clear fluid draining from his legs for the last two weeks. He has had decreased sensation in his feet over this time as well. He currently reports diffuse joint pain. He denies abdominal pain.     REVIEW OF SYSTEMS: A 10 point review of systems is asked and negative except as above.     PAST MEDICAL HISTORY:   Past Medical History:   Diagnosis Date     Basal cell carcinoma    DMII  CHF  A fib  Morbid obesity  HLD  History of MI  Hypothyroidism    SURGICAL HISTORY:   Past Surgical History:   Procedure Laterality Date     BIOPSY OF SKIN LESION       MOHS MICROGRAPHIC PROCEDURE       PICC INSERTION Right 09/24/2017    5fr TL Bard PICC, 51cm (1cm external) in the R medial brachial vein w/ tip in the SVC.       SOCIAL HISTORY:   Social History     Social History     Marital status: Single     Spouse name: N/A     Number of children: N/A     Years of education: N/A     Social History Main Topics     Smoking status: Never Smoker     Smokeless tobacco: Never Used     Alcohol use No      Comment: Former alcohol abuse. Quit 70s then quit later in 80s-present     Drug use: No      Comment: \"Nothing these days can " "compare to the LSD in the 70s\"     Sexual activity: Not Asked     Other Topics Concern     None     Social History Narrative    Lives in an apartment in 16th floor near hospital.  Has elevators, unable to use stairs. Not able to shop; has things delivered to him including food. Difficulty completing cleaning. Goes to PCP once yearly for annual check up and medication review.     FAMILY HISTORY:   Family History   Problem Relation Age of Onset     Depression Mother      CANCER No family hx of      No family history of skin cancer       ALLERGIES:    No Known Allergies    MEDICATIONS:    No current facility-administered medications on file prior to encounter.   Current Outpatient Prescriptions on File Prior to Encounter:  multivitamin, therapeutic with minerals (MULTI-VITAMIN) TABS tablet Take 1 tablet by mouth daily   aspirin 81 MG tablet Take 1 tablet (81 mg) by mouth daily   atorvastatin (LIPITOR) 40 MG tablet Take 1 tablet (40 mg) by mouth daily   hydrochlorothiazide (HYDRODIURIL) 25 MG tablet Take 1 tablet (25 mg) by mouth daily   levothyroxine (SYNTHROID/LEVOTHROID) 150 MCG tablet Take 1 tablet (150 mcg) by mouth daily   lisinopril (PRINIVIL/ZESTRIL) 10 MG tablet Take 1 tablet (10 mg) by mouth daily   metFORMIN (GLUCOPHAGE) 500 MG tablet Take 1 tablet (500 mg) by mouth 2 times daily (with meals)   metoprolol (TOPROL-XL) 100 MG 24 hr tablet Take 1 tablet (100 mg) by mouth daily   omega 3 1000 MG CAPS Take 1 g by mouth daily   ammonium lactate (LAC-HYDRIN) 12 % cream Apply topically 2 times daily as needed for dry skin (Patient not taking: Reported on 8/14/2017)   Omega-3 Fatty Acids (OMEGA-3 FISH OIL PO) Take 1,000 mg by mouth daily       PHYSICAL EXAM:   /76  Temp 98.3  F (36.8  C) (Oral)  Resp 15  Ht 1.803 m (5' 11\")  Wt (!) 201.8 kg (444 lb 14.2 oz)  SpO2 98%  BMI 62.05 kg/m2  General: Alert male in no acute distress.  HEENT: Normocephalic, atraumatic. Patent nares. EOMI. PERRLA.  Chest wall: " Symmetric thorax.   Respiratory: Non-labored breathing with ventimask in place.   Cardiovascular: Irregularly irregular rhythm  Gastrointestinal: Abdomen soft, morbidly obese, non-distended, non-tender to palpation. No rebound or guarding.  Extremities: Moving all four extremities. Venous stasis changes to bilateral lower extremities with scaling. Open wounds at shins and toes/dorsum of feet bilaterally with seeping serous fluid. Severe +4 pitting edema bilaterally. Cool to touch bilaterally. Doppler signal present at PT (biphasic) and DP (monophasic) bilaterally. Radial and femoral pulses palpable.  Skin: As above.     LAB DATA: Reviewed.   Na 130  K 4.0  Cl 89  CO2 26  BUN 11  Cr 1.02  Ca 8.4  Lactate 0.8  Troponin <0.015  Glucose 135  WBC 13.2  Hgb 18.6 (17.2)  Plt 163  BNP 1745  T bili 1.0  Alk phos 70  ALT 21  AST 17  CK 84    IMAGING:   CT chest PE protocol 9/23/2017:  FINDINGS: There is no pulmonary embolism. Enlarged main pulmonary  artery measuring 4.2 cm, previously measuring 3.7 cm, concerning for  pulmonary arterial hypertension. The heart is not enlarged.  No  pericardial effusions. The thoracic aorta is atherosclerotic without  evidence of dissection or aneurysm. Mild atherosclerotic lesions of  the great vessels and coronary arteries.     Trace left pleural effusion. Small to moderate right pleural effusion.   There is no pneumothorax.      Central tracheobronchial tree is patent. Prominent fat within the  mediastinum. Subcentimeter mediastinal lymph nodes measuring up to 9  mm, not enlarged by size criteria. No axillary lymphadenopathy. No  hilar lymphadenopathy. Moderate bilateral gynecomastia.     LUNGS: Bilateral lower lobes atelectasis, right greater them left.  Mild atelectasis in the right middle lobe and left lingula. No  suspicious pulmonary nodules.     Upper abdomen: Biliary sludge and/or small stones within the  gallbladder. Small perisplenic free fluid. Small sliding hiatal  hernia.  Unremarkable adrenal glands. Hepatic steatosis. Evaluation is  limited due to the patient body habitus.     Bones: Degenerative changes of the spine. No suspicious lesions in the  bones.      IMPRESSION:   1. No definite pulmonary embolism demonstrated. Enlarged main  pulmonary artery slightly increased in diameter from the prior study,  concerning for pulmonary arterial hypertension.  2. Small to moderate right pleural effusion. Trace left pleural  effusion. Bilateral lower lobes atelectasis.

## 2017-09-24 NOTE — PLAN OF CARE
Problem: Patient Care Overview  Goal: Plan of Care/Patient Progress Review  OT 4C: Hold - OT orders received and acknowledged. Pt transferred to ICU overnight due to poor respiratory status and hypotensive. Pt DNI and currently on BiPAP, will hold OT evaluation until pt medically appropriate to initiate therapies.

## 2017-09-24 NOTE — PLAN OF CARE
Problem: Patient Care Overview  Goal: Plan of Care/Patient Progress Review  Edema/4C: Lymphedema evaluation orders received and acknowledged. Per chart review, pt with poor respiratory status and decline in medical status, not appropriate to apply compression at this time. Will hold therapy and evaluation until pt is medically appropriate.

## 2017-09-24 NOTE — PLAN OF CARE
Problem: Infection, Risk/Actual (Adult)  Goal: Identify Related Risk Factors and Signs and Symptoms  Related risk factors and signs and symptoms are identified upon initiation of Human Response Clinical Practice Guideline (CPG).   Outcome: Improving  D: THONG, venous stasis  I/A: Patient lethargic and confused this am, improved with bipap.  Pt weaned off bipap, now using 2L oxiplus mask.  Nasal trumpet removed at 1200.  PICC line placed at 1000, levo weaned off.  Vascular surgeons saw patient and peeled callous off R heel revealing pre-existing pressure injury.  WOC consult placed.     P: bariatric size bed ordered.

## 2017-09-24 NOTE — CONSULTS
TRAUMA SURGERY HISTORY AND PHYSICAL    ASSESSMENT/PLAN: Clarke Moreira is a 67-year-old male admitted after fall from sitting. On exam, he does not appear to have any traumatic injuries. His presentation is consistent with CHF exacerbation. He has a history of DMII and does have some diminished sensation in his lower extremities which may have contributed to his fall. The perisplenic fluid collection identified on CT scan is small. His mechanism of injury is low impact. The scan does not show any active blush of contrast. His hemoglobin has been stable (increasing) for the last 24 hours. His hemodynamic instability is likely attributable to his heart disease.     -- No acute intervention at this time  -- Recommend waiting 24 hours after injury to start therapeutic anticoagulation given possibility of splenic injury  -- No need for further imaging or intervention if patient's abdominal exam and hemoglobin remain stable    Discussed with Dr. Garza.     Von Segura MD  PGY-3, General Surgery  Pager: [phone]    - - - - - - - - - -    CHIEF COMPLAINT / REASON FOR CONSULT: perisplenic fluid    HISTORY OF PRESENT ILLNESS: Clarke Moreira is a 67-year-old morbidly obese male who presented to [facility] after fall with complaints of chest pain and shortness of breath on 9/23/2017. Per report, the patient fell from a chair and was unable to stand after falling so he called 911. In the ED, he was found to be in atrial fibrillation with a rapid ventricular rate which continued after admission. Overnight, he had another episode of a fib with RVR and during workup was found to have hypercapnia and lethargy. He subsequently developed hypotension and was admitted to the ICU due to pressures in the 70s/40s. He was hypoventilating with altered mental status and due to DNR/DNI status was placed on BiPAP with some improvement. On CT scan looking for PE, he was found to have a small amount of perisplenic fluid. Due to recent  "fall, the trauma surgery team was consulted for evaluation. The patient denies hitting his head or losing consciousness after falling. He is not sure exactly how he fell or what he hit. He reports chronic joint pain but no increased pain from baseline at this time. He specifically denies abdominal pain. He denies new numbness/tingling. He has not had any issues with bowel or bladder function. He is not on any blood thinners at home.     REVIEW OF SYSTEMS: A 10 point review of systems is asked and negative except as above.     PAST MEDICAL HISTORY:   Past Medical History:   Diagnosis Date     Basal cell carcinoma    DMII  CHF  A fib  Morbid obesity  HLD  History of MI  Hypothyroidism    SURGICAL HISTORY:   Past Surgical History:   Procedure Laterality Date     BIOPSY OF SKIN LESION       MOHS MICROGRAPHIC PROCEDURE       PICC INSERTION Right 09/24/2017    5fr TL Bard PICC, 51cm (1cm external) in the R medial brachial vein w/ tip in the SVC.       SOCIAL HISTORY:   Social History     Social History     Marital status: Single     Spouse name: N/A     Number of children: N/A     Years of education: N/A     Social History Main Topics     Smoking status: Never Smoker     Smokeless tobacco: Never Used     Alcohol use No      Comment: Former alcohol abuse. Quit 70s then quit later in 80s-present     Drug use: No      Comment: \"Nothing these days can compare to the LSD in the 70s\"     Sexual activity: Not Asked     Other Topics Concern     None     Social History Narrative    Lives in an apartment in 16th floor near hospital.  Has elevators, unable to use stairs. Not able to shop; has things delivered to him including food. Difficulty completing cleaning. Goes to PCP once yearly for annual check up and medication review.     FAMILY HISTORY:   Family History   Problem Relation Age of Onset     Depression Mother      CANCER No family hx of      No family history of skin cancer       ALLERGIES:    No Known " "Allergies    MEDICATIONS:    No current facility-administered medications on file prior to encounter.   Current Outpatient Prescriptions on File Prior to Encounter:  multivitamin, therapeutic with minerals (MULTI-VITAMIN) TABS tablet Take 1 tablet by mouth daily   aspirin 81 MG tablet Take 1 tablet (81 mg) by mouth daily   atorvastatin (LIPITOR) 40 MG tablet Take 1 tablet (40 mg) by mouth daily   hydrochlorothiazide (HYDRODIURIL) 25 MG tablet Take 1 tablet (25 mg) by mouth daily   levothyroxine (SYNTHROID/LEVOTHROID) 150 MCG tablet Take 1 tablet (150 mcg) by mouth daily   lisinopril (PRINIVIL/ZESTRIL) 10 MG tablet Take 1 tablet (10 mg) by mouth daily   metFORMIN (GLUCOPHAGE) 500 MG tablet Take 1 tablet (500 mg) by mouth 2 times daily (with meals)   metoprolol (TOPROL-XL) 100 MG 24 hr tablet Take 1 tablet (100 mg) by mouth daily   omega 3 1000 MG CAPS Take 1 g by mouth daily   ammonium lactate (LAC-HYDRIN) 12 % cream Apply topically 2 times daily as needed for dry skin (Patient not taking: Reported on 8/14/2017)   Omega-3 Fatty Acids (OMEGA-3 FISH OIL PO) Take 1,000 mg by mouth daily       PHYSICAL EXAM:   /76  Temp 98.3  F (36.8  C) (Oral)  Resp 15  Ht 1.803 m (5' 11\")  Wt (!) 201.8 kg (444 lb 14.2 oz)  SpO2 98%  BMI 62.05 kg/m2  General: Alert male in no acute distress.  HEENT: Normocephalic, atraumatic. Patent nares. EOMI. PERRLA.  Neck: No neck tenderness or midline tenderness. Trachea midline.  Chest wall: Symmetric thorax. Nontender to palpation.   Respiratory: Non-labored breathing with ventimask in place.   Cardiovascular: Irregularly irregular rhythm  Gastrointestinal: Abdomen soft, morbidly obese, non-distended, non-tender to palpation. No rebound or guarding.  Pelvis: Stable  Extremities: Moving all four extremities. Sensation intact though diminished bilaterally in distal lower extremities. Venous stasis changes to bilateral lower extremities with scaling. Open wounds at shins and toes/dorsum " of feet bilaterally with seeping serous fluid. Severe +4 pitting edema bilaterally. Cool to touch bilaterally. Doppler signal present at PT (biphasic) and DP (monophasic) bilaterally. Radial and femoral pulses palpable. No bony tenderness in extremities.  Skin: As above.     LAB DATA: Reviewed.   Na 130  K 4.0  Cl 89  CO2 26  BUN 11  Cr 1.02  Ca 8.4  Lactate 0.8  Troponin <0.015  Glucose 135  WBC 13.2  Hgb 18.6 (17.2)  Plt 163  BNP 1745  T bili 1.0  Alk phos 70  ALT 21  AST 17  CK 84    IMAGING:   CT chest PE protocol 9/23/2017:  FINDINGS: There is no pulmonary embolism. Enlarged main pulmonary  artery measuring 4.2 cm, previously measuring 3.7 cm, concerning for  pulmonary arterial hypertension. The heart is not enlarged.  No  pericardial effusions. The thoracic aorta is atherosclerotic without  evidence of dissection or aneurysm. Mild atherosclerotic lesions of  the great vessels and coronary arteries.     Trace left pleural effusion. Small to moderate right pleural effusion.   There is no pneumothorax.      Central tracheobronchial tree is patent. Prominent fat within the  mediastinum. Subcentimeter mediastinal lymph nodes measuring up to 9  mm, not enlarged by size criteria. No axillary lymphadenopathy. No  hilar lymphadenopathy. Moderate bilateral gynecomastia.     LUNGS: Bilateral lower lobes atelectasis, right greater them left.  Mild atelectasis in the right middle lobe and left lingula. No  suspicious pulmonary nodules.     Upper abdomen: Biliary sludge and/or small stones within the  gallbladder. Small perisplenic free fluid. Small sliding hiatal  hernia. Unremarkable adrenal glands. Hepatic steatosis. Evaluation is  limited due to the patient body habitus.     Bones: Degenerative changes of the spine. No suspicious lesions in the  bones.      IMPRESSION:   1. No definite pulmonary embolism demonstrated. Enlarged main  pulmonary artery slightly increased in diameter from the prior study,  concerning for  pulmonary arterial hypertension.  2. Small to moderate right pleural effusion. Trace left pleural  effusion. Bilateral lower lobes atelectasis.    XR chest 9/24/2017:  Findings:  Single AP view of the chest. Right upper extremity PICC tip  projects over the high SVC. Stable enlargement of the  cardiomediastinal silhouette which may be related to technique.  Improved small bilateral layering pleural effusions and adjacent  basilar opacities. Unchanged hazy perihilar and mixed interstitial and  airspace opacities. Visualized upper abdomen and osseous structures  are unremarkable.     Impression:    1. Right upper extremity PICC tip projects over the high SVC.  2. Improved small bilateral pleural effusions and adjacent basilar  atelectasis and/or consolidation.  3. Stable prominent cardiomediastinal silhouette and pulmonary  vascular congestion.

## 2017-09-24 NOTE — PROGRESS NOTES
ICU staff cross-cover  DOS 9/24/2017    Called by resident regarding patient deterioration on the floor -- hypotension, respiratory failure, atrial fibrillation, in the setting of DNR/DNI code status.    Arrived on 5A and assessed patient. In brief, he is a 66 y/o man admitted 9/23 with weakness after a fall from a sitting position. He also had chest pain and dyspnea while waiting for EMS to arrive and help him up. He has frequent falls. On the floor tonight he developed rapid atrial fibrillation. During the workup, an ABG was drawn that revealed critically high PCO2. The patient was lethargic, and BiPAP was initiated. He became hypotensive shortly thereafter.    PMHx per the chart notable for atrial fibrillation, hypertension, venous stasis ulcers, DM2, morbid obesity BMI 64, dysliipidemia, depression. Medications include lisinopril, metoprolol, levothyroxine.    On initial examination, the patient was lethargic and would open eyes to voice but not follow other commands. HR was 150s and BP was 70s/40s. NC/AT. Large beard inhibiting BiPAP seal. Sclerae anicteric. Pulses difficult to palpate in radials, thready/irregular. Minimal chest rise, and BiPAP volumes noted ~200 or so with MVe 3-5 L/min. Abdomen obese, soft, not apparently tender. Lower extremities with changes of chronic venous insufficiency.     Labs reviewed.  - Chemistry notable for Na 130, BUN/Cr 11/1, lactate 0.8  - CBC with WBCs 13.2, Hb 18.6 (17.3), plt 163.  - ABG 7.13/114/73/38 on 4.5L -> 7.14/112/58/38 after BiPAP started.    CXR from 9/23 shows cardiomegaly, diffuse interstitial infiltrates.     CT chest from 9/23 shows no PE, a small right pleural effusion, and fairly clear-looking airspaces. There is perisplenic fluid without other ascites noted in the limited view of the upper abdomen.    This patient is transferred to the MICU for hypotension, rapid atrial fibrillation, and respiratory failure. He is DNR/DNI. He is critically ill by my  assessment.    CNS:   - Altered mental status: Secondary to hypercarbia. Has been improving and he is awakening more. Supportive cares.  Pulm:   - Acute hypercarbic respiratory failure: BiPAP initially placed with poor respiratory excursion. No audible breath sounds. Performed jaw thrust with immediate improvement in Vt from 100s-200s to 600s. Placed nasal trumpet in right nare with some overall improvement in Vt, averaging in the 400s-500s. As mental status has improved, Vt/MVe have improved. Rechecking ABG. Keep HOB up to help with airway patency.  - Obstructive sleep apnea: Based on respiratory pattern. Consider CPAP/BiPAP at night.  - Repeat CXR pending.  CV: Tachycardic, hypotensive.  - Atrial fibrillation with RVR: Amiodarone bolus given. Check and correct electrolytes. Rates improving with better ventilation.  - Hypotension: ?Hypovolemia component, RVR component. Has some findings to suggest intravascular depletion -- polycythemia (Hb 18), loss of BP with positive pressure ventilation. Hyponatremia with hypochloremia could represent hypovolemia as well. Fluid bolus has helped pressures a bit; giving another liter now and reassessing. Has three peripheral IV lines; norepinephrine was started, favor switching to phenylephrine for better PIV safety as well as reflex bradycardia which may help HR.   - Deferred central line placement in light of code status; as patient is awakening more we may be able to clarify with him whether he is OK with aggressive/invasive procedures, pressors, cardioversion, etc.  FEN/GI: Abdomen benign. NPO for now.  - ?Splenic laceration: History of (low-energy) trauma with perisplenic fluid on CT is concerning for potential splenic laceration. He is hypotensive, though he has reasons other than blood loss to explain this change, and given the time course and rising Hb, I have a low suspicion of ongoing blood loss. Consider asking trauma to review.   : Given unclear fluid status, favor  escalona catheter placement.  Heme: Hb 18.  - Polycythemia: ?Unclear etiology. Could be on the dry side, though he may also run at a higher Hb given values of 16 and 17 in 2008 (only other data points).   Musculoskeletal: Extremities warm, well-perfused.   Endocrine: Glucose 135.  - DM2: SSI.  ID: On cephalexin for possible cellulitis; to my examination he appeared to have primarily venous insufficiency. AM team will re-assess and reconsider need for antimicrobials.  SICU: LMWH.    MICU day team has taken over the patient and will re-evaluate and staff on rounds. I provided a  total of 70 minutes critical care time on 9/24/2017.    Del Quiroz MD, PhD  Surgical critical care  September 24, 2017, 7:19 AM

## 2017-09-24 NOTE — H&P
Medical ICU  History & Physical    Clarke Moreira MRN: [id number]  Age: 67 year old, : 1950  Date of Admission:2017  Primary care provider: Matthias Sanchez       Chief Complaint     Altered Mental Status, Hypercarbia, Hypotension.       History of Present Illness     Clarke Sotelo is a 67 year old male with a history notable for morbid obesity and diabetes that was admitted following a fall and CP/SOB on .  Patient fell when getting out of his chair and was unable to stand following the fall, thus 911 was called.  Developed CP and SOB following the fall.       In the ED, was found to be in A fib with RVR, received dose of metoprolol and furosemide and was admitted for evaluation of chest pain, given concerns for ability to care at home as well.     Overnight patient was noted to be in AFIB w/ RVR.  Also, noted to be somnolent at that time and VBG was obtained that was notable for for pH of 7.15 and a CO2 of 106.  Patient was placed on BIPAP and subsequently became hypotensive w/ systolic BP's in the 50-60's. He was transferred to the MICU for vasopressor and BiPAP support.      ROS: unable to obtain due to patient's mentation     Past Medical History     Past Medical History:   Diagnosis Date     Basal cell carcinoma           Past Surgical History     Past Surgical History:   Procedure Laterality Date     BIOPSY OF SKIN LESION       MOHS MICROGRAPHIC PROCEDURE       PICC INSERTION Right 2017    5fr TL Bard PICC, 51cm (1cm external) in the R medial brachial vein w/ tip in the SVC.           Social History     Social History   Substance Use Topics     Smoking status: Never Smoker     Smokeless tobacco: Never Used     Alcohol use No      Comment: Former alcohol abuse. Quit 70s then quit later in 80s-present          Family History     Family History   Problem Relation Age of Onset     Depression Mother      CANCER No family hx of      No family history of skin cancer     Family history  "reviewed and updated in EPIC and reviewed       Allergies     No Known Allergies       Medications     Prescriptions Prior to Admission   Medication Sig Dispense Refill Last Dose     multivitamin, therapeutic with minerals (MULTI-VITAMIN) TABS tablet Take 1 tablet by mouth daily        aspirin 81 MG tablet Take 1 tablet (81 mg) by mouth daily 30 tablet 12      atorvastatin (LIPITOR) 40 MG tablet Take 1 tablet (40 mg) by mouth daily 30 tablet 11      hydrochlorothiazide (HYDRODIURIL) 25 MG tablet Take 1 tablet (25 mg) by mouth daily 30 tablet 12      levothyroxine (SYNTHROID/LEVOTHROID) 150 MCG tablet Take 1 tablet (150 mcg) by mouth daily 30 tablet 11      lisinopril (PRINIVIL/ZESTRIL) 10 MG tablet Take 1 tablet (10 mg) by mouth daily 30 tablet 12      metFORMIN (GLUCOPHAGE) 500 MG tablet Take 1 tablet (500 mg) by mouth 2 times daily (with meals) 60 tablet 11      metoprolol (TOPROL-XL) 100 MG 24 hr tablet Take 1 tablet (100 mg) by mouth daily 30 tablet 12      omega 3 1000 MG CAPS Take 1 g by mouth daily 90 capsule 3      ammonium lactate (LAC-HYDRIN) 12 % cream Apply topically 2 times daily as needed for dry skin (Patient not taking: Reported on 8/14/2017) 385 g 3 Not Taking     Omega-3 Fatty Acids (OMEGA-3 FISH OIL PO) Take 1,000 mg by mouth daily   Not Taking            Physical Exam     Vital Signs:  Temp: 99.1  F (37.3  C) Temp src: Oral BP: (!) 100/91   Heart Rate: 114 Resp: 20 SpO2: 95 % O2 Device: Oxi Plus Oxygen Delivery: 4 LPM Height: 180.3 cm (5' 11\") Weight: (!) 201.8 kg (444 lb 14.2 oz)  Estimated body mass index is 62.05 kg/(m^2) as calculated from the following:    Height as of this encounter: 1.803 m (5' 11\").    Weight as of this encounter: 201.8 kg (444 lb 14.2 oz).    General: Awake and alert, moaning, not conversive d/t BiPAP  Neuro: CN II-XII intact, moves all extremities spontaneously.   HEENT: NC/AT, PERRL b/l,  sclera anicteric.  Pulmonary:Coarse breath sounds anteriorly, no " rales/rhonchi/wheezes  Cardiovascular:RRR, normal S1 and S2, no murmurs appreciated. Heart sounds distant. DP pulses dopplerable but weak, marked bilaterally.  Abdomen: obese, soft, nontender, nondistended, no organomegaly, + bowel sounds  Extremity: warm and well perfused, edema to the knee bilaterally.   Skin: Bilateral woody changes in the lower extremities consistent with venous stasis, erythema bilaterally with warmth, onychomycosis bilaterally in toes.     Lines:   PIV x 3, PICC    Labs     Reviewed.   Data     Recent Labs  Lab 09/24/17  0430 09/23/17 2022 09/23/17  1049 09/23/17  1043   WBC 13.2*  --  10.8  --    HGB 18.6*  --  17.2  --    MCV 98  --  94  --     192 158  --    *  --  125*  --    POTASSIUM 4.0  --  3.8  --    CHLORIDE 89*  --  87*  --    CO2 26  --  29  --    BUN 11  --  10  --    CR 1.02 0.76 0.77  --    ANIONGAP 15*  --  10  --    CHERI 8.4*  --  8.2*  --    *  --  120*  --    ALBUMIN  --   --  2.6*  --    PROTTOTAL  --   --  5.6*  --    BILITOTAL  --   --  1.0  --    ALKPHOS  --   --  70  --    ALT  --   --  21  --    AST  --   --  17  --    TROPI <0.015 <0.015 0.015  --    TROPONIN  --   --   --  0.00        Imaging     Reviewed.      Assessment and Plan      Clarke Sotelo is a 67 year old male with a history notable for morbid obesity, atrial fibrillation, HFwrEF and diabetes that was admitted following a fall and CP/SOB on 9/23.  Transferred to MICU on 9/24 for AMS with acute hypoxic/hypercarbic respiratory failure and hypotension requiring initiation of BiPAP and pressors.  Much improved following nasal airway and BiPAP with immediate achievement of hemodynamic stability - now with improved mentation.     Neurologic  # Hypercapnic Encephalopathy  Acute mental status change early AM 9/24, found to be hypercarbic likely contributing etiology. Now more awake and alert following non invasive positive pressure ventilation with BiPAP. Did consider worsening sepsis in  context of treatment for cellulitis; iatrogenic (but no altering medications received).  - Monitor mental status  - ABG if mental status change  - BMP now    # Fall   Per HPI patient did not report any prodromal symptoms.  No loss of consciousness.  Presumed to be a mechanical fall, however, in history, appears to have had worsening feeling in feet over the past week which may have contributed.   Troponin negative x 3, thus unlikely to represent ACT.  CT obtained on admission did note small perisplenic fluid collection, negative for PE.   - will discuss perisplenic fluid with surgery as below.  - PT/OT following   - SW following       # Depression  History of Depression and PTSD with limited social support. Declined therapy and in hospital chaplaincy services. No antidepressants at home.  - continue to monitor, consider outpatient vs. Inpatient evaluation    Cardiovascular  # Atrial Fibrillation with RVR, improving  History of A fib in the past, not anticoagulated at home but on metoprolol for rate control. CHADS-VaSC 5. Now on prophylactic Lovenox (not treatment dose), metoprolol. Received loading dose of amiodarone and started on amiodarone drip on arrival to MICU with the initial thought that his afib with RVR was causing his hypotension. Since correcting his underlying respiratory failure, he has now improved  - s/p amiodarone load and 6 hour amiodarone drip.  - Will continue metoprolol 100 mg q day  - Continue prophylactic Lovenox, will evaluate splenic free fluid further prior to beginning therapeutic anticoagulation with heparin drip with plan to bridge to warfarin.  - can d/c levophed at this time.    # History of HFw/rEF  # Pulmonary Edema  History of coronary artery disease/peripheral vascular disease per patient on admission. Last Echo 2008 with EF 40-45%. Currently on lisinopril, HCTZ, Metoprolol at home. Evidence of pulmonary edema on CXR on admission to MICU, possible worsening pulmonary edema  contributing to hypercapnea and decreased ventilation.   - Echocardiogram today: LVEF appeared normal; RV mildly reduced, IVC 3.34 without respiratory variability (though unclear if BiPap was on at the time)  - Holding HCTZ in the setting of loop diuretic administration   - Hold diuresis until maintaining pressures off levophed   - Fluid restriction <1.5 L per day    # Peripheral Vascular Disease  # Venous Stasis   Patient reports worsening numbness/loss of sensation in his feet bilaterally over the last week, acutely worsening since admission. Weakly dopplerable pulses. Sensation and motor function intact. Concern for possible acute arterial occlusion in the setting of atrial fibrillation vs acutely worsening peripheral vascular disease vs progression of probable diabetic neuropathy.   - Vascular surgery consulted  - Lower extremity doppler US now  - q shift dopplers     # Atypical Chest Pain  # Dyspnea  Developed left sided chest pain and shortness of breath after fall from chair. History of CAD/PVD per patient, not documented in EMR. Troponins x3 negative, D-dimer elevated but CT PE study negative for embolism. Pain not improved with nitroglycerin. CK negative, rhabdo unlikely (normal renal function, potassium). Attributable to musculoskeletal at this point.   - Cardiorespiratory monitoring  - Continue PTA lipitor   - Continue asa 81 mg q day  - Metoprolol as above    # Hypertension   On lisinopril at home given history of hypertension and DMII. Creatinine normal on admission. Now with improved hypotension.  - Continue lisinopril 10 mg q day    Respiratory  # Acute on Chronic Hypercapnic Respiratory Failure  # Evidence of Pulmonary Hypertension  Developed AMS, A fib with RVR to 150-160s and respiratory distress early AM 9/24, placed on BiPAP with development of acute hypotension SBP 50s. ABG with pH 7.14, CO2 113. Repeat ABG with improvement in pH to 7.29, CO2 66. No concerns for hypoxemia. Initially taking poor  Vt, this improved with jaw thrust manuevers with nasal trumpy placed, likely component of airway obstruction given neck habitus. CXR without pneumothorax. Respiratory failure likely attributable to worsening pulmonary edema, hypotension multifactorial in the setting of a fib with RVR and positive airway ventilation, possibly reducing preload further. Likely with component of THONG with Obesity Hypoventilation Syndrome with BMI of 62. Polycythemia is likely representative of chronic hypoxia in setting of THONG/OHS.  - Continue BiPAP, O2 to keep saturations > 90%  - will titrate BiPAP overnight with pre and post ABG's.  - Diuresis as above - will do 40 mg qday lasix.  - Respiratory viral panel pending    Gastrointestinal  # Perisplenic Free Fluid  Noted on CT Chest in the ED 9/24, splenic laceration considered given fall. However, Hgb has remained stable, no increase in abdominal distension, no abdominal pain. Unlikely contributing to respiratory decompensation.   - Monitor, daily abdominal exams  - Daily CBC  - Low threshold to reimage if concern for splenic hemorrhage   - General surgery consulted    Nutrition:  Can reinitiate cards diet once off BiPap    Genitourinary  Bazzi catheter placed for strict I/O's while on pressors/diuresing.     Infectious Disease  # Lower Extremity Cellulitis   History of CAD per patient, noted to have venous stasis changes with erythema and warmth bilaterally on admission concerning for cellulitis, started on Cephalexin for treatment of soft tissue infection. Hypotension unlikely related to blood stream infection related to this given acute resolution, normal lactate, absence of other infectious symptoms (fever).   - WOC and lymphedema following  - Cephalexin changed to Cefazolin for IV administration at this time, can transition to cephalexin.  - Low threshold for blood cultures/lactate (normal lactic on admission)    Antibiotic History:  Cephalexin (9/23-9/24)  Cefazolin  (9/24-present)    Hematology  # Polycythemia  Hgb 17.2 on admission, now 18.6. Likely becoming more concentrated given diuresis. Polycythemia likely indicative of underlying obstructive hypercapnia, THONG vs. Obesity hypoventilation. Reassuring that he is not experiencing acute hemorrhage precipitating his respiratory distress.   - CBC daily     Endocrine  # DMII  On Metformin at home. HgbA1c 6.5 on 8/14/17.  - Low dose SSI    # Hypothyroidism  TSH on admission 3.11, on Synthroid 150 mcg at home  - Continue synthroid 150 mcg     Renal/Electolytes/FEN  # Hyponatremia  Initially concerning for hypervolemia in the setting of pulmonary edema and possible HF exacerbation. Urine Osms 161, although difficult to interpret in the setting of diuresis. Improved from 125 to 130, will slow rate of correction d/t concern for precipitating CPM. Received 2 L NS bolus on transfer to MICU, switched to 1 L LR bolus.   - Daily CMP  - Continue to follow, expect to improve with diuresis     Skin Care  #Cellulitis  #Lower Extremity Venous Stasis   Management as above, routine skin/wound care per nursing, wound and lymphedema following.     DVT PPX: Lovenox, avoid SCDs d/t cellulitis   GI PPX: none  CODE: DNR/DNI  Family: Will clarify patient's point of contact once patient more awake and able to tolerate conversing without BiPAP.  POC is Janice Hillman [phone].    Patient seen and discussed with staff attending, Dr. Stout.      Andre rOtega MD  Medicine-Pediatrics PGY3  [phone]

## 2017-09-24 NOTE — PLAN OF CARE
Admitted from ED after a fall at home. Pt coming in with BLE weakness, hypoxia, and LE wounds. AOx4. Oriented to unit, room, call light system. Admission packet given. VSS. Using urinal. Urine sample sent. On 2L NC. Continuous pox. Set pt up for dinner. Ordering bariatric bed.

## 2017-09-24 NOTE — PROVIDER NOTIFICATION
09/24/17 0600   Call Information   Date of Call 09/24/17   Time of Call 0443   Name of person requesting the team Sugey   Title of person requesting team RN   RRT Arrival time 0445   Time RRT ended 0600   Reason for call   Type of RRT Adult   Primary reason for call Neurological   Neurological Acute change in LOC or neuro status   Was patient transferred from the ED, ICU, or PACU within last 24 hours prior to RRT call? Yes   SBAR   Situation Change in LOC; became unresponsive   Background admitted after falling & chest pain   Notable History/Conditions Cancer;Cardiac;Diabetes;Hypertension  (Obesity;swollen lower extremities; Code Status: DNR/DNI)   Assessment After starting BiPap; became hypotensive;    Interventions Fluid bolus;Labs;Meds;O2 per N/C or mask;Portable monitor   Patient Outcome   Patient Outcome Transferred to  (Unit 4C for pressors)   RRT Team   Attending/Primary/Covering Physician Del Quiroz MD   Physician(s) Luis Hameed MD; Cindy Hoyos MD; Mario Jose MD, Dr. Butler;     Lead RN Any Chahal RN; Winsome Tsang RN   RN Sugey Balbuena RN   RT George Hill RT   Other staff Virginia Hester RN   Post RRT Intervention Assessment   Post RRT Assessment Stable/Improved   Date Follow Up Done 09/24/17   Time Follow Up Done 0800   Comments more Responsive; acidosis improving.

## 2017-09-24 NOTE — PROGRESS NOTES
Cross cover note:    Called by nursing due to patient lethargy and HR 150s-160s. Patient has known atrial fibrillation. On interview patient lethargic but arousable and oriented to person, place, year. Patient denies chest pain or increased work of breathing. Patient afebrile, hemodynamically stable, HR 150s, requiring 3 L oxygen. Exam was heart irregularly irregular, lungs anterior lung fields CTAB, abdomen nontender, extremities arm. Ordered 5 mg IV metoprolol. Will obtain stat EKG, VBG , and continue to monitor.    Eduardo Jose MD PhD  PGY-1, Internal Medicine  [phone]     Addendum: Patient ECG shows atrial fibrillation with RVR. VBG ph 7.15 pCO2 106. Will start bipap at this time. Patient is DNR/DNI. Patient does not have emergency contact listed in the chart to notify.     Addendum: Patient ABG (drawn before BiPAP initiated) ph 7.13, pCO2 114, PO2 73. Will continue BiPAP, order CXR repeat ABG in 1 hour.       Addendum:  Called for afib w/RVR to 150s as above.  Patient AOx3 but lethargic, obtained stat labs.  VBG notable for pH 7.15, CO2 106.  Patient noted to be DNR/DNI.  Started on BiPAP.  Shortly after starting BiPAP patient's BP dropped to 70s/40s.  Remained responsive to voice but lethargic.  Discussed with MICU team; patient transferred down for further management.    Luis Hameed MD  Internal Medicine PGY-2

## 2017-09-25 ENCOUNTER — APPOINTMENT (OUTPATIENT)
Dept: OCCUPATIONAL THERAPY | Facility: CLINIC | Age: 67
DRG: 291 | End: 2017-09-25
Payer: COMMERCIAL

## 2017-09-25 LAB
ANION GAP SERPL CALCULATED.3IONS-SCNC: 8 MMOL/L (ref 3–14)
BASE EXCESS BLDV CALC-SCNC: 13.6 MMOL/L
BASE EXCESS BLDV CALC-SCNC: 15.8 MMOL/L
BASE EXCESS BLDV CALC-SCNC: 5.1 MMOL/L
BASE EXCESS BLDV CALC-SCNC: 7.7 MMOL/L
BASE EXCESS BLDV CALC-SCNC: 9 MMOL/L
BUN SERPL-MCNC: 7 MG/DL (ref 7–30)
CALCIUM SERPL-MCNC: 8.2 MG/DL (ref 8.5–10.1)
CHLORIDE SERPL-SCNC: 91 MMOL/L (ref 94–109)
CO2 SERPL-SCNC: 36 MMOL/L (ref 20–32)
CREAT SERPL-MCNC: 0.66 MG/DL (ref 0.66–1.25)
CREAT SERPL-MCNC: 0.76 MG/DL (ref 0.66–1.25)
ERYTHROCYTE [DISTWIDTH] IN BLOOD BY AUTOMATED COUNT: 14.2 % (ref 10–15)
GFR SERPL CREATININE-BSD FRML MDRD: >90 ML/MIN/1.7M2
GFR SERPL CREATININE-BSD FRML MDRD: >90 ML/MIN/1.7M2
GLUCOSE BLDC GLUCOMTR-MCNC: 108 MG/DL (ref 70–99)
GLUCOSE BLDC GLUCOMTR-MCNC: 82 MG/DL (ref 70–99)
GLUCOSE SERPL-MCNC: 85 MG/DL (ref 70–99)
HCO3 BLDV-SCNC: 35 MMOL/L (ref 21–28)
HCO3 BLDV-SCNC: 37 MMOL/L (ref 21–28)
HCO3 BLDV-SCNC: 37 MMOL/L (ref 21–28)
HCO3 BLDV-SCNC: 39 MMOL/L (ref 21–28)
HCO3 BLDV-SCNC: 39 MMOL/L (ref 21–28)
HCO3 BLDV-SCNC: 41 MMOL/L (ref 21–28)
HCT VFR BLD AUTO: 53.1 % (ref 40–53)
HGB BLD-MCNC: 16.8 G/DL (ref 13.3–17.7)
INR PPP: 1.31 (ref 0.86–1.14)
INTERPRETATION ECG - MUSE: NORMAL
INTERPRETATION ECG - MUSE: NORMAL
MAGNESIUM SERPL-MCNC: 1.8 MG/DL (ref 1.6–2.3)
MCH RBC QN AUTO: 30.9 PG (ref 26.5–33)
MCHC RBC AUTO-ENTMCNC: 31.6 G/DL (ref 31.5–36.5)
MCV RBC AUTO: 98 FL (ref 78–100)
O2/TOTAL GAS SETTING VFR VENT: 50 %
O2/TOTAL GAS SETTING VFR VENT: ABNORMAL %
O2/TOTAL GAS SETTING VFR VENT: ABNORMAL %
OXYHGB MFR BLDV: 68 %
OXYHGB MFR BLDV: 82 %
OXYHGB MFR BLDV: 88 %
PCO2 BLDV: 47 MM HG (ref 40–50)
PCO2 BLDV: 50 MM HG (ref 40–50)
PCO2 BLDV: 60 MM HG (ref 40–50)
PCO2 BLDV: 87 MM HG (ref 40–50)
PCO2 BLDV: 87 MM HG (ref 40–50)
PCO2 BLDV: 90 MM HG (ref 40–50)
PH BLDV: 7.21 PH (ref 7.32–7.43)
PH BLDV: 7.22 PH (ref 7.32–7.43)
PH BLDV: 7.26 PH (ref 7.32–7.43)
PH BLDV: 7.39 PH (ref 7.32–7.43)
PH BLDV: 7.5 PH (ref 7.32–7.43)
PH BLDV: 7.55 PH (ref 7.32–7.43)
PHOSPHATE SERPL-MCNC: 3.3 MG/DL (ref 2.5–4.5)
PLATELET # BLD AUTO: 166 10E9/L (ref 150–450)
PO2 BLDV: 29 MM HG (ref 25–47)
PO2 BLDV: 32 MM HG (ref 25–47)
PO2 BLDV: 45 MM HG (ref 25–47)
PO2 BLDV: 56 MM HG (ref 25–47)
POTASSIUM SERPL-SCNC: 3.2 MMOL/L (ref 3.4–5.3)
POTASSIUM SERPL-SCNC: 3.4 MMOL/L (ref 3.4–5.3)
RBC # BLD AUTO: 5.43 10E12/L (ref 4.4–5.9)
SODIUM SERPL-SCNC: 135 MMOL/L (ref 133–144)
WBC # BLD AUTO: 9.8 10E9/L (ref 4–11)

## 2017-09-25 PROCEDURE — 85610 PROTHROMBIN TIME: CPT | Performed by: INTERNAL MEDICINE

## 2017-09-25 PROCEDURE — 97166 OT EVAL MOD COMPLEX 45 MIN: CPT | Mod: GO | Performed by: OCCUPATIONAL THERAPIST

## 2017-09-25 PROCEDURE — 40000275 ZZH STATISTIC RCP TIME EA 10 MIN

## 2017-09-25 PROCEDURE — 99223 1ST HOSP IP/OBS HIGH 75: CPT | Performed by: HOSPITALIST

## 2017-09-25 PROCEDURE — 82565 ASSAY OF CREATININE: CPT | Performed by: INTERNAL MEDICINE

## 2017-09-25 PROCEDURE — 94660 CPAP INITIATION&MGMT: CPT

## 2017-09-25 PROCEDURE — 25000128 H RX IP 250 OP 636: Performed by: INTERNAL MEDICINE

## 2017-09-25 PROCEDURE — 82803 BLOOD GASES ANY COMBINATION: CPT | Performed by: INTERNAL MEDICINE

## 2017-09-25 PROCEDURE — 99207 ZZC CDG-CORRECTLY CODED, REVIEWED AND AGREE: CPT | Performed by: HOSPITALIST

## 2017-09-25 PROCEDURE — 97530 THERAPEUTIC ACTIVITIES: CPT | Mod: GO | Performed by: OCCUPATIONAL THERAPIST

## 2017-09-25 PROCEDURE — 83735 ASSAY OF MAGNESIUM: CPT | Performed by: INTERNAL MEDICINE

## 2017-09-25 PROCEDURE — 82805 BLOOD GASES W/O2 SATURATION: CPT | Performed by: STUDENT IN AN ORGANIZED HEALTH CARE EDUCATION/TRAINING PROGRAM

## 2017-09-25 PROCEDURE — 84100 ASSAY OF PHOSPHORUS: CPT | Performed by: INTERNAL MEDICINE

## 2017-09-25 PROCEDURE — 25000132 ZZH RX MED GY IP 250 OP 250 PS 637: Performed by: INTERNAL MEDICINE

## 2017-09-25 PROCEDURE — 99207 ZZC DOWN CODE DUE TO SUBSEQUENT EXAM: CPT | Performed by: PSYCHIATRY & NEUROLOGY

## 2017-09-25 PROCEDURE — 99212 OFFICE O/P EST SF 10 MIN: CPT

## 2017-09-25 PROCEDURE — 00000146 ZZHCL STATISTIC GLUCOSE BY METER IP

## 2017-09-25 PROCEDURE — 85027 COMPLETE CBC AUTOMATED: CPT | Performed by: INTERNAL MEDICINE

## 2017-09-25 PROCEDURE — 99291 CRITICAL CARE FIRST HOUR: CPT | Mod: GC | Performed by: INTERNAL MEDICINE

## 2017-09-25 PROCEDURE — 99221 1ST HOSP IP/OBS SF/LOW 40: CPT | Performed by: PSYCHIATRY & NEUROLOGY

## 2017-09-25 PROCEDURE — 12000008 ZZH R&B INTERMEDIATE UMMC

## 2017-09-25 PROCEDURE — 84132 ASSAY OF SERUM POTASSIUM: CPT | Performed by: INTERNAL MEDICINE

## 2017-09-25 PROCEDURE — 80048 BASIC METABOLIC PNL TOTAL CA: CPT | Performed by: INTERNAL MEDICINE

## 2017-09-25 PROCEDURE — 99356 ZZC PROLONGED SERV,INPATIENT,1ST HR: CPT | Performed by: HOSPITALIST

## 2017-09-25 PROCEDURE — 40000133 ZZH STATISTIC OT WARD VISIT: Performed by: OCCUPATIONAL THERAPIST

## 2017-09-25 PROCEDURE — 25000125 ZZHC RX 250: Performed by: INTERNAL MEDICINE

## 2017-09-25 PROCEDURE — 82803 BLOOD GASES ANY COMBINATION: CPT | Performed by: STUDENT IN AN ORGANIZED HEALTH CARE EDUCATION/TRAINING PROGRAM

## 2017-09-25 RX ORDER — POTASSIUM CHLORIDE 1.5 G/1.58G
20-40 POWDER, FOR SOLUTION ORAL
Status: DISCONTINUED | OUTPATIENT
Start: 2017-09-25 | End: 2017-10-08 | Stop reason: HOSPADM

## 2017-09-25 RX ORDER — SALIVA STIMULANT COMB. NO.3
2 SPRAY, NON-AEROSOL (ML) MUCOUS MEMBRANE
Status: DISCONTINUED | OUTPATIENT
Start: 2017-09-25 | End: 2017-10-08 | Stop reason: HOSPADM

## 2017-09-25 RX ORDER — DEXTROSE MONOHYDRATE 25 G/50ML
25-50 INJECTION, SOLUTION INTRAVENOUS
Status: DISCONTINUED | OUTPATIENT
Start: 2017-09-25 | End: 2017-10-08 | Stop reason: HOSPADM

## 2017-09-25 RX ORDER — POTASSIUM CHLORIDE 7.45 MG/ML
10 INJECTION INTRAVENOUS
Status: DISCONTINUED | OUTPATIENT
Start: 2017-09-25 | End: 2017-10-08 | Stop reason: HOSPADM

## 2017-09-25 RX ORDER — NICOTINE POLACRILEX 4 MG
15-30 LOZENGE BUCCAL
Status: DISCONTINUED | OUTPATIENT
Start: 2017-09-25 | End: 2017-10-08 | Stop reason: HOSPADM

## 2017-09-25 RX ORDER — POTASSIUM CHLORIDE 750 MG/1
20-40 TABLET, EXTENDED RELEASE ORAL
Status: DISCONTINUED | OUTPATIENT
Start: 2017-09-25 | End: 2017-10-08 | Stop reason: HOSPADM

## 2017-09-25 RX ORDER — POTASSIUM CHLORIDE 29.8 MG/ML
20 INJECTION INTRAVENOUS ONCE
Status: DISCONTINUED | OUTPATIENT
Start: 2017-09-25 | End: 2017-09-25

## 2017-09-25 RX ORDER — FUROSEMIDE 10 MG/ML
40 INJECTION INTRAMUSCULAR; INTRAVENOUS DAILY
Status: DISCONTINUED | OUTPATIENT
Start: 2017-09-26 | End: 2017-09-30

## 2017-09-25 RX ORDER — POTASSIUM CL/LIDO/0.9 % NACL 10MEQ/0.1L
10 INTRAVENOUS SOLUTION, PIGGYBACK (ML) INTRAVENOUS
Status: DISCONTINUED | OUTPATIENT
Start: 2017-09-25 | End: 2017-10-08 | Stop reason: HOSPADM

## 2017-09-25 RX ORDER — METOPROLOL TARTRATE 1 MG/ML
10 INJECTION, SOLUTION INTRAVENOUS EVERY 6 HOURS
Status: DISCONTINUED | OUTPATIENT
Start: 2017-09-25 | End: 2017-09-26

## 2017-09-25 RX ORDER — MAGNESIUM SULFATE HEPTAHYDRATE 40 MG/ML
4 INJECTION, SOLUTION INTRAVENOUS EVERY 4 HOURS PRN
Status: DISCONTINUED | OUTPATIENT
Start: 2017-09-25 | End: 2017-10-08 | Stop reason: HOSPADM

## 2017-09-25 RX ORDER — MAGNESIUM SULFATE 1 G/100ML
1 INJECTION INTRAVENOUS
Status: DISCONTINUED | OUTPATIENT
Start: 2017-09-25 | End: 2017-09-25

## 2017-09-25 RX ADMIN — POTASSIUM CHLORIDE 10 MEQ: 7.46 INJECTION, SOLUTION INTRAVENOUS at 11:00

## 2017-09-25 RX ADMIN — METOPROLOL TARTRATE 10 MG: 5 INJECTION INTRAVENOUS at 13:31

## 2017-09-25 RX ADMIN — FUROSEMIDE 40 MG: 10 INJECTION, SOLUTION INTRAVENOUS at 08:14

## 2017-09-25 RX ADMIN — POTASSIUM CHLORIDE 10 MEQ: 7.46 INJECTION, SOLUTION INTRAVENOUS at 10:00

## 2017-09-25 RX ADMIN — METOPROLOL TARTRATE 10 MG: 5 INJECTION INTRAVENOUS at 19:48

## 2017-09-25 RX ADMIN — METOPROLOL TARTRATE 10 MG: 5 INJECTION INTRAVENOUS at 08:06

## 2017-09-25 RX ADMIN — ENOXAPARIN SODIUM 40 MG: 40 INJECTION SUBCUTANEOUS at 18:33

## 2017-09-25 RX ADMIN — WARFARIN SODIUM 7.5 MG: 2.5 TABLET ORAL at 18:33

## 2017-09-25 RX ADMIN — POTASSIUM CHLORIDE 10 MEQ: 7.46 INJECTION, SOLUTION INTRAVENOUS at 12:00

## 2017-09-25 RX ADMIN — SODIUM CHLORIDE, PRESERVATIVE FREE 5 ML: 5 INJECTION INTRAVENOUS at 19:48

## 2017-09-25 RX ADMIN — CEFAZOLIN SODIUM 2 G: 2 INJECTION, SOLUTION INTRAVENOUS at 19:49

## 2017-09-25 RX ADMIN — CEFAZOLIN SODIUM 2 G: 2 INJECTION, SOLUTION INTRAVENOUS at 13:00

## 2017-09-25 RX ADMIN — CEFAZOLIN SODIUM 2 G: 2 INJECTION, SOLUTION INTRAVENOUS at 04:00

## 2017-09-25 ASSESSMENT — PAIN DESCRIPTION - DESCRIPTORS: DESCRIPTORS: CONSTANT;ACHING

## 2017-09-25 NOTE — PLAN OF CARE
"Problem: Patient Care Overview  Goal: Plan of Care/Patient Progress Review  Pt remained on Bipap for most of shift with breaks to talk on the phone and/or talk with consulting physicians.  Co2 this morning was 87.  Pt was alert and oriented at that time, very talkative.  At 1600 while palliative was in pt room, pt seemed a little off, slower to respond than normal but still answering questions appropriately.  Pt also began to get anxious and was worried about \"all the fluid in my belly\".  Rechecked c02 and it was 47.  Pt taken off Bipap and switched to oxymask, will recheck C02 momentarily.  Pt still remains alert and oriented and becoming less anxious and back to baseline.  Beard shaved to accomodate Bipap.  Friend, Janice, visiting today and updated at bedside.  Pt talked with social work about an advanced directive and also with palliative about goals of care.  Pt stated that he wanted to get a hold of a few more friends and then would like to be on comfort care, with no Bipap.  AF under control with IV Metoprolol.  6B orders in place and pt is now on Maroon 4 service.  Will continue to monitor/assess.               "

## 2017-09-25 NOTE — PROGRESS NOTES
[facility]  CRITICAL CARE STAFF NOTE    Acute Issues     1. Hypoxemic, hypercarbic respiratory failure in setting of obesity hypoventilation/COPD. Issues with regard to maintaining his MVe in setting of disinterest vs active psychiatric issues. He is DNR/DNR. We are planning psychiatric and palliative care consultation. Cont NIPPV for now and get VBG when clinically necessary.     The patient was seen and examined with the resident/fellow physician.  We have discussed the patient in detail and I agree with the findings, assessment, and plan as documented when this note was cosigned on September 25, 2017. The plan was formulated in conjunction with pharmacy, ICU nurses, and respiratory therapist. I have evaluated all laboratory values and imaging studies for the past 24 hours. I have reviewed all the consults that have been ordered and are active for this patient.      Critical Care Time: 30 min.  I spent this time (excluding procedures) personally providing and directing critical care services at the bedside and on the critical care unit.      Harrison Franco MD  Pulmonary and Critical Care  [facility]  Pager:  [phone]

## 2017-09-25 NOTE — PROGRESS NOTES
Brief Vascular Note    Patient will have outpatient clinic appointment with Dr. Velazquez in 4-6 weeks with VICK and TCPO2  Continue local wound care  Vascular surgery will sign off    Please do not hesitate to contact Dr. Velazquez's vascular surgery team with any questions.    Milana KIRK, CNS  Division of Vascular Surgery  [facility]  Pager [phone]

## 2017-09-25 NOTE — PLAN OF CARE
Problem: Patient Care Overview  Goal: Plan of Care/Patient Progress Review     OT-4c: OT eval complete. Attempted lift to chair, but could not locate wide enough sling/chair initially. Obtained xiao recliner and sling, but RN concerned about new CO2 levels and no longer appropriate for OOB. Pt normally I in most ADLs, although is very sedentary. Ambulates short distances with a cane, and lives alone. Some confusion and tangential conversation during session. Rec d/c to TCU.

## 2017-09-25 NOTE — PROGRESS NOTES
Social Work Services Progress Note  Hospital Day: 2  Date of Initial Social Work Evaluation:  9/23/17; please reference for details.   Collaborated with:  Team rounds; chart reviewed; Dr. Willis, Psychiatry; Dee Florez MD Palliative Care.     Data:    Spoke with Dr. Willis this afternoon and he felt patient has decision capacity and he notified writer that patient is requesting to complete a health care directive; however he is stating he does not have anyone to appoint as his health care agent.     Intervention:    Attempted to meet with patient this afternoon; however initially OT was evaluating patient and then Palliative Care arrived to meet with patient. Discussed case with Dr. Florez, Palliative and she will plan to confirm/discuss patient's wishes and f/u re: the health care. Will plan to f/u after Palliative Care has had a chance to meet with patient. Per previous SW note, patient is agreeable to placement.     Received call from Sugey LEMOS from [facility] ([phone]); provided an update to her and she requested SW update her once d/c plan is better determined.     Assessment:    Significant relationship at present time:  None at this time that patient is able to identify.   Family of origin is available for support:  No.   Other support available:  Friend, Janice Hillman.   Gaps in support system:  Very minimal support.   Patient is caregiver to:  NA    Plan:  Anticipated Disposition:  Anticipating TCU vs LTC placement once medically stable.   Barriers to d/c plan:  Medical stability.   Follow Up:  Will continue to follow for ongoing support and d/c planning.     AARON Schmid, [facility]  ICU   Pager: [phone]  Phone: [phone]

## 2017-09-25 NOTE — PROGRESS NOTES
"CLINICAL NUTRITION SERVICES - ASSESSMENT NOTE     Nutrition Prescription    RECOMMENDATIONS FOR MDs/PROVIDERS TO ORDER:  If pt's diet cannot be advanced in the next 2-3 days, please consider nutrition support (enteral nutrition)    Malnutrition Status:    Patient does not meet criteria necessary for diagnosing malnutrition    Recommendations already ordered by Registered Dietitian (RD):  None today    Future/Additional Recommendations:  1. If enteral nutrition is indicated, would recommend: Peptamen VHP @ goal 90 ml/hr (2160 ml/day) to provide 2160 kcals (11 kcal/kg of  kg/day), 201 g PRO (2.6 g/kg of IBW 78 kg/day), 1814 ml free H2O, 168 g CHO and 9 g Fiber daily.  ---15 mL liquid MVI for micronutrient needs  ---30 mL free water flush q4h for tube patency    2. Monitor for WOC RN note for need of micronutrient supplementation    3. If diet is advanced and pt is not eating well, order kcal ct and offer ONS     REASON FOR ASSESSMENT  Clarke Moreira is a/an 67 year old male assessed by the dietitian for Admission Nutrition Risk Screen for stageable pressure injuries or large/non-healing wound or burn and new/uncontrolled diabetes    NUTRITION HISTORY  Unable to obtain r/t pt sleeping on BiPAP during visit    Per H&P, pt's T2DM is not new, chronic R heel PI is likely r/t lack of BG control, awaiting WOC RN note    CURRENT NUTRITION ORDERS  Diet: NPO on BiPAP  Intake/Tolerance: N/A    LABS  9/25:  K = 3.2 (L)    MEDICATIONS  Medications reviewed    ANTHROPOMETRICS  Height: 180.3 cm (5' 11\")  Most Recent Weight: (!) 201.8 kg (444 lb 14.2 oz)    IBW: 78.2 kg  BMI: 62; Obesity Grade III BMI >40  Weight History:   Wt Readings from Last 10 Encounters:   09/24/17 (!) 201.8 kg (444 lb 14.2 oz)   08/14/17 (!) 191 kg (421 lb)   09/28/16 (!) 187.3 kg (413 lb)   09/14/16 (!) 187.4 kg (413 lb 3.2 oz)   08/01/14 (!) 189.4 kg (417 lb 9.6 oz)   06/07/13 (!) 192 kg (423 lb 4.8 oz)   03/13/13 (!) 191.7 kg (422 lb 9.6 oz) "     Dosing Weight: 109 kg (adjusted using lowest admit wt of 201.8 kg on 9/24, IBW = 78.2 kg)    ASSESSED NUTRITION NEEDS  Estimated Energy Needs: 1076-5552 kcals/day (11 - 14 kcals/kg of  kg)  Justification: Morbidly Obese  Estimated Protein Needs: 156-195 grams protein/day (2 - 2.5 grams of pro/kg of IBW 78 kg)  Justification: Obesity guidelines  Estimated Fluid Needs: (1 mL/kcal)   Justification: Per provider pending fluid status    PHYSICAL FINDINGS  See malnutrition section below.  No abnormal nutrition-related physical findings observed.     MALNUTRITION  % Intake: Unable to assess, assume no decline given weight gain in the recent past  % Weight Loss: None noted  Subcutaneous Fat Loss: None observed  Muscle Loss: None observed  Fluid Accumulation/Edema: None noted  Malnutrition Diagnosis: Patient does not meet two of the above criteria necessary for diagnosing malnutrition    NUTRITION DIAGNOSIS  Inadequate protein-energy intake related to respiratory distress as evidenced by day 2 NPO on BiPAP    INTERVENTIONS  Implementation  Nutrition Education: Unable to complete due to pt sleeping during visit   Collaboration with other providers - MICU rounds  Enteral Nutrition - Recs     Goals  Diet adv v nutrition support within 2-3 days.     Monitoring/Evaluation  Progress toward goals will be monitored and evaluated per protocol.    Karla Andrew RDN, LD  Pgr: [phone]

## 2017-09-25 NOTE — CONSULTS
DATE OF SERVICE:  09/25/2017        REQUESTING SOURCE:  MICU team.      IDENTIFYING DATA:  Clarke Moreira is a 67-year-old man seen today for psychiatric consultation regarding his history of depression and to comment on his decision-making capacity.      HISTORY OF PRESENT ILLNESS:  Mr. Moreira has a history of morbid obesity (with a BMI of 62) and diabetes type 2.  He is admitted after falling from his chair, being unable to get up.  He was transported to the Emergency Department after a neighbor called 911.  In the Emergency Department he is noted to be retaining CO2 to a marked degree and so required transfer to MICU to be supported with the assistance of BiPAP.  It is not clear whether he will need this long-term.  Regardless, he makes it clear to me that he finds the mask very intrusive and he would have a hard time tolerating it in the long-term.  He also noted to having some ambivalence about how to proceed in terms of the intensity of medical intervention going forward.  He said he did not want aggressive interventions to prolong his life, but at this point it is not clear what his needs would be.  I believe an effort was made to have him work on an advanced directive in the Emergency Department, but at that point he did have significant altered mental status due to his retention of CO2.  At this point, he has cleared in terms of his cognition so I think he would be in a position to proceed with an advanced directive.  There is concern by the primary team whether depression may affect his decision-making capacity.  He did admit to a long history of depression off and on associated with some anxiety.  Recently, however, he has been feeling fairly good mood-wise but he is somewhat discouraged by his current medical setback.  I had a chance to chat briefly with his friend Janice about his situation.  She mentions he did have a difficult childhood and has had trouble processing this over the years, no current  "PTSD symptoms. He says that his mood is \"fairly good.\"  He is sleeping okay.  Appetite is good.  He is not hopeless or suicidal.   He does have some anxiety, but no panic attacks.  No history of significant mood swings or manic symptoms.  No history of thought disorder symptoms.      PAST PSYCHIATRIC HISTORY:  No psychiatric hospitalizations as far as I can ascertain, he has not been on antidepressants or anxiety medications.  No psychotherapy, although he would be interested in this.      SUBSTANCE USE HISTORY:  When he was very young he did use alcohol and appears other drugs for a short period of time, but not that many years.  Nonsmoker.      PAST MEDICAL HISTORY:  Morbid obesity, diabetes type 2, history of basal cell carcinoma.      PAST SURGICAL HISTORY:  Mohs micrographic procedure.      ALLERGIES:  No known allergies.      REVIEW OF SYSTEMS:  A 10-point review of systems was completed today and was negative except for shortness of breath and discomfort from his O2 mask and diffuse aches and pains.      FAMILY HISTORY:  Stated he did not know much about his family but said that his parents  when he was quite young.  He is not aware of any significant depression or substance use problems.      SOCIAL HISTORY:  Grew up in [place] and did attend the [employer] for many years but did not complete a degree.  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  He said he retired at age 55 due to health reasons.  Never , no children.  He lives alone and appears to be quite isolated and has no family contact and very few friends.  It appears that he spends approximately 20 hours per day in his recliner watching TV or reading.      MENTAL STATUS EXAMINATION:  He is lying in his ICU bed, is noted to have BiPAP mask in place which he is complaining about.  He is reasonably well groomed and overall pleasant and cooperative during my visit.  No muscular rigidity.  " "Speech is fluent.  Thought process directed.  Associations tight.  Denies delusions or hallucinations.  Mood described as \"fair.\"  Affect is somewhat restricted.  He was oriented x3.  Recent and remote memory, attention span and concentration are grossly intact.  Use of language, fund of knowledge appears above average.  Insight and judgment good.      DIAGNOSIS:  Major depressive disorder, mild to moderate.      IMPRESSION:  He has a long history of depression but at this juncture, it does not seem particularly severe and I do not think it affects his decision-making capacity.  Certainly the extent of medical intervention that he desires needs to be clarified and agree it would be a good idea to have Palliative Care involved.  At this point, I do not see any reason for treatment with an antidepressants, but certainly he would be a good candidate for some psychotherapy.      RECOMMENDATIONS:     1.  Will need to work with him on advanced directives.   2.  Consider Health Psychology consultation since he appears to be quite anxious to discuss some of his ongoing stressors.   3.  Please reconsult Psychiatry as needed.         SAMSON BREEN MD             D: 2017 14:47   T: 2017 16:40   MT: FOREIGN      Name:     AJIT CALLAHAN   MRN:      [id number]        Account:       [id number]   :      1950           Consult Date:  2017      Document: [id number]      "

## 2017-09-25 NOTE — CONSULTS
Patient seen and chart reviewed. Note dictated (initial)    In my opinion, he does have intact decision making capacity.   He is somewhat depressed, but no interest in using antidepressants, and I don't see a reason to push these.  Re-consult psychiatry as needed.

## 2017-09-25 NOTE — PROGRESS NOTES
09/25/17 1551   Quick Adds   Type of Visit Initial Occupational Therapy Evaluation   Living Environment   Lives With alone   Living Arrangements apartment   Home Accessibility tub/shower is not walk in;grab bars present (bathtub)   Number of Stairs to Enter Home 0   Number of Stairs Within Home 0   Transportation Available family or friend will provide   Self-Care   Usual Activity Tolerance fair   Current Activity Tolerance poor   Regular Exercise no   Equipment Currently Used at Home cane, straight   Activity/Exercise/Self-Care Comment (Walks approx 900 yards to computer room ~3x/week)   Functional Level Prior   Ambulation 1-->assistive equipment   Transferring 1-->assistive equipment   Toileting 1-->assistive equipment   Bathing 0-->independent   Dressing 0-->independent   Eating 0-->independent   Cognition 0 - no cognition issues reported   Fall history within last six months yes   Number of times patient has fallen within last six months 2  (Fell getting up from chair prior to admission.)   Prior Functional Level Comment Pt was I in most ADL/IADLs prior to admission, but does not drive. Doesn't use tub, sponge bathes. Manages his own finances and medications, and performs all housekeeping.    General Information   Referring Physician Kayla Lee MD   Patient/Family Goals Statement Return to PLOF   Additional Occupational Profile Info/Pertinent History of Current Problem 67 year old male with a history notable for morbid obesity, atrial fibrillation, HFrEF, diabetes, PVD, and untreated depression that was admitted following a fall and CP/SOB on 9/23.  Transferred to MICU on 9/24 for AMS with acute hypoxic/hypercarbic respiratory failure and hypotension    Precautions/Limitations fall precautions  (R heel ulcer)   Cognitive Status Examination   Cognitive Comment Some confusion since admission, will monitor   Visual Perception   Visual Acuity Wears bifocals   Transfer Skill: Bed to Chair/Chair to Bed    Level of Donovan: Bed to Chair dependent (less than 25% patients effort)   Physical Assist/Nonphysical Assist: Bed to Chair set-up required;supervision;verbal cues;nonverbal cues (demo/gestures);2 persons   Assistive Device - Transfer Skill Bed to Chair Chair to Bed Rehab Eval (lift)   Clinical Impression   Criteria for Skilled Therapeutic Interventions Met yes, treatment indicated   OT Diagnosis Decreased I in ADLs due to deconditioning   Assessment of Occupational Performance 3-5 Performance Deficits   Identified Performance Deficits dressing, toileting, bathing, functional endurance, cognition   Clinical Decision Making (Complexity) Moderate complexity   Therapy Frequency 5 times/wk   Predicted Duration of Therapy Intervention (days/wks) 2 weeks   Anticipated Equipment Needs at Discharge (tbd)   Anticipated Discharge Disposition Transitional Care Facility   Risks and Benefits of Treatment have been explained. Yes   Patient, Family & other staff in agreement with plan of care Yes   Total Evaluation Time   Total Evaluation Time (Minutes) 15

## 2017-09-25 NOTE — PROGRESS NOTES
WO Nurse Inpatient Wound Assessment    Initial Assessment  Reason for consultation: Evaluate and treat Right heel wound  Pt has open wounds on bilateral LE.  Seen by vascular, orders in place for these wounds.  Pt declined assessment by this writer as they had just been assessed.        ASSESSMENT:   Right heel wound due to Pressure Injury   Unstageable pressure injury, present on admit  Status: initial assessment    TREATMENT PLAN:     Right heel (per vascular surgery): Medihoney (#[id number]) to right heel, covered by mepilex change every other day or as needed for excess drainage.    Orders Written  WO Nurse follow-up plan: weekly  Nursing to notify the Provider(s) and re-consult the WO Nurse if wound(s) deteriorates or new skin concern.    Patient History  According to provider note(s):     Objective Data   Containment of urine/stool: Continent of bowel and Indwelling catheter    Active Diet Order    Active Diet Order      Low Saturated Fat Na <2400 mg    Output:  I/O last 3 completed shifts:  In: 3190.36 [P.O.:30; I.V.:1160.36; IV Piggyback:2000]  Out: 4120 [Urine:4120]    Risk Assessment:   Peterson Peterson Score  Av  Min: 13  Max: 17                                 Labs:     Recent Labs  Lab 17  0400   HGB 16.8   WBC 9.8       Recent Labs  Lab 17   CULT No growth after 2 days No growth after 2 days           PHYSICAL EXAM:   Skin assessment: Right heel    Wound Location: Right heel  Date of last photo: 17    Wound History: Pt unable to give history.  Legs are painful with movement.  Location of wound suggests pressure due to immobility.    Measurements (length x width x depth, in cm) 3 x 1.3 x 0.2 cm  Wound Base:  Mixed brown and yellow slough with smooth red rim  Palpation of the wound bed: normal   Periwound skin: intact and dry/scaly  Color: pink  Temperature: normal   Drainage: small  Description of drainage: serosanguinous  Odor: none  Pain: denies , (pts  legs are painful, but not the heel    Nose: while assessing heel bipap off and noted bright red skin under mask.  Area blanched easily, not a pressure injury at this time.  Mask is tight due to pts beard impeding a good seal.  RT placed gecko pad under mask to prevent pressure injury.       INTERVENTIONS:   Current support surface: Bariatric Big turn mattress  Current off-loading measures: Heel off-loading boot(s) and Foam padding  Visual inspection of wounds(s) completed.  Wound Care: was done per plan of care.  Supplies: medihoney, xeroform guaze (for legs, ordered per vascular)  Education provided today: RN. pt    Discussed plan of care with Patient and Nurse  Face to face time: 20 minutes

## 2017-09-25 NOTE — PLAN OF CARE
"Problem: Patient Care Overview  Goal: Plan of Care/Patient Progress Review  Outcome: Improving  Assumed care at approx 1900hrs with patient observed to be lethargic, on bipap. HDS. Prior to shift commencement, departing RN adminsistered 0.5mg of IVP Ativan in order to facilitate the use of NIPPV. NUERO- mostly lethargic and difficult to arouse for majority of shift, approx 0200hrs, spont awoke. Stated he felt better with mask on, advised patient of necessity of its use for current hypercapnia. Pt agreed, was very compliant with its use. Other than noticeable drowsiness, no overt neuro deficits observed. CV- originally transferred for HD instability, has now resolved. remained HDS thru out shift, despite uncontrolled AFIB, no use of antiarrythmics or pressors utilized. POC possibly involves the use of PO BB for better rate control. PULMO- began shift at 12/8 with 100% Fio2, with Spo2 at or just below 100%, RR 14-19 with adequate pull of 700-850 Vt. POC involves Q4H VBGs, at appox 0400 noted slight up trend in CO2, Dr. Hoyos to order adjustment, 20/8 with Fio2 of 50%. Pending next VBG at 0800hrs. Otherwise, pt tolerated BiPAP  Use very well. GI/GI- FC uitilized for diuresising, last lasix IVP administered on day shift. POC involves strict I&O's. NPO while actively on BiPAP, otherwise, pt has low Na+ diet ordered with 1.8 fluid restriction. No BM noted on shift. Wendi- Advised Dr. Hoyos of this am's hypokalemia and hypomagmasemia, 3.2 and 1.8 respectively. Pending replacement orders. [employer]-  Vascular surgeons saw patient during day shift, and peeled callous off R heel revealing pre-existing pressure injury. Meplix placed. Bilat stasis legs cleaned and cared for per Vasc Surg recommedations, feet placed in boots. WOC consult placed. MICU resident and CN updated of acute changes, pt stated he has no family, if life long friend \"Janice Happe\" calls that it is allowed to give updates. Overall POC-  Will advise on-coming of " possible transfer to 6A/B.

## 2017-09-25 NOTE — CONSULTS
[facility], [place]    Palliative Care Consultation Note    Patient: Clarke Moreira  Date of Admission:  9/23/2017    Requesting provider/team: RAQUEL  Reason for consult: Decisional support    Recommendations:  Please see assessment below for rationale.  - continue use of BiPAP as needed for now  - transition to comfort cares once patient is ready (wants to say goodbye to his friends)  - I will ask the palliative clinical  to meet him for support in coping with his situation  - will address anticoagulation and discuss in more detail what a transition to comfort cares will look like      These recommendations have [facility] with [facility], [facility].    Thank you for the opportunity to participate in the care of this patient and family. Our team will follow. Please feel free to contact the on-call Palliative provider with any urgent needs.     Dee Florez  Pager: [phone]   [facility] Inpatient Team Consult pager [phone] (M-F 8-4:30)  After-hours Answering Service [phone]   Palliative Clinic: [phone]       Assessment:  Clarke Moreira is a 67 year old male with PMHx pertinent for THONG, OHS, morbid obesity, AFib with RVR, suspected long-standing depression who was admitted 9/23 after falling and being unable to get up.   He was found to by hypercarbic here and is now essentially BiPAP dependent.     Symptoms:    Dyspnea: pt does not endorse sensation of shortness of breath or increased work of breathing. He dislikes wearing the BiPAP, but agrees with it for now in order to be able to say farewell to his two good friends.      No other specific physical symptoms. He expresses concern about perceived changes in his body (feels fluid building up and shifting areas of firmness across his abdomen).     Social:         Living situation: lives by himself. Content with very limited social contacts.        Support system: He has two friends. Has told the team  that he doesn't want to burden either with being his health care agent. No other contacts in his life, denies having family who is alive.          Financial concerns: limited means. States he has made a contract with a [email or web address].         Substance use disorder (past / present): no recent use, alcohol and LSD in the past    Mental Health: endorses life-long history of mental illness, especially low mood and passive suicidal ideation. Also tells me that he thinks his mother had mental illness.     Coping: states acceptance of his limited life expectancy and of likely dying during this hospitalization. He does seem to have concerns about the process of dying and around symptoms. He states that he feels he is drowning / submerging more than once during my visit.     Prognostic Information: currently maintained with BiPAP, likely very limited life expectancy of hours to a few days once he transitions to comfort care without BiPAP.    However, I am uncertain as to why he has developed significant CO2 retention, so with treatment of his AFib and diuresis he might recover from this acute change and actually stabilize.   Advance Care Planning: he doesn't have completed documents. He is uncertain who he would want to make his health care agent.      - confirms DNR/DNI today   - states he doesn't want any measures prolonging his life beyond continuing BiPAP until he has been able to say goodbye to friends    History of Present Illness   Sources of History:patient, team, electronic health record  Clarke is sitting up in bed with the BiPAP in place when I meet him. He frequently needs help removing the mask to clear his throat (?secretions, dryness, other source of irritation). Eventually we take the BiPAP off for the duration of our conversation. However, he begins to loose focus and becomes more slowed after a short while (10+minutes) and requests the BiPAP to be put back on.   He tells me about his difficult  childhood and his feeling of never quite fitting and never quite being able to do what he had set out to do. He shares that he is quite ok with dying from the current breathing issue, that he doesn't want to continue BiPAP for any prolonged amount of time, doesn't want a breathing machine and in general no aggressive measures to prolong his life. He seems pleasantly surprised when I accept his wishes and express support.   He has some questions about the process of transitioning to comfort cares and again seems surprised that he could possibly die in the hospital (as in he won't get sent to a different facility specifically for dying there).     He seems very concerned about perceived changes in his body, but is not able to clarify to me whether he is worried about the meaning of all this, whether these are directly uncomfortable or if he is telling us about these for some other reason.     ROS:  Palliative Symptom Review (0=no symptom/no concern, 1=mild, 2=moderate, 3=severe):  Pain: 0  Fatigue: 0  Nausea: 0  Constipation: 0  Diarrhea: 0  Depressive Symptoms: 0  Anxiety: 0  Drowsiness: 0  Poor Appetite: 0  Shortness of Breath: 0  Insomnia: 0  Delirium: 0  Other: 0  Overall (0 good/no concerns, 3 very poor): 1       Past Medical History:   Past Medical History:   Diagnosis Date     Basal cell carcinoma           Past Surgical History:   Past Surgical History:   Procedure Laterality Date     BIOPSY OF SKIN LESION       MOHS MICROGRAPHIC PROCEDURE       PICC INSERTION Right 09/24/2017    5fr TL Bard PICC, 51cm (1cm external) in the R medial brachial vein w/ tip in the SVC.             Family History:   Family History   Problem Relation Age of Onset     Depression Mother      CANCER No family hx of      No family history of skin cancer     Family history: mental illness in mother       Allergies:   No Known Allergies         Medications:   I have reviewed this patient's medication profile and medications given in the  past 24 hours.  Ondansetron - none    miralax prn, bisacodyl prn, senna prn -  none           Physical Exam:   Vital Signs: Temp: 98  F (36.7  C) Temp src: Axillary BP: 105/69   Heart Rate: 90 Resp: 16 SpO2: 97 % O2 Device: BiPAP/CPAP    Weight: 444 lbs 14.21 oz    Physical Exam:   CONSTIT: awake, appears comfortable, obese  EENT: MM dry, redness over nose and chin (2/2 BiPAP mask), EOMI, no icterus  RESP: reg, nl effort  CARDIOVASC: irreg, tachy, no m/r/g  GI: soft, non-tender  MSK:moves x4  SKIN:  warm, no rash, no obvious lesions  NEURO: alert, oriented x3  PSYCH: appropriate affect, memory and thought process intact         Data reviewed:   ABGs with elevated CO2     TTE: limited quality. Nl LVEF, RV mildly reduced    Dee Florez  Pager: [phone]  [facility] Inpatient Team Consult pager [phone] (M-F 8-4:30)  After-hours Answering Service [phone]  Palliative Clinic: [phone]     Total time spent was 110 minutes,  >50% of time was spent counseling and/or coordination of care regarding treatment plan.  55 min of that were spent face-to-face, in 15:35, out 16:30.

## 2017-09-25 NOTE — PROGRESS NOTES
[facility]   MICU Progress Note  Clarke Moreira MRN: [id number]  1950     Date of Admission:9/23/2017    Assessment and Plan:       Clarke Sotelo is a 67 year old male with a history notable for morbid obesity, atrial fibrillation, HFrEF, diabetes, PVD, and untreated depression that was admitted following a fall and CP/SOB on 9/23.  Transferred to MICU on 9/24 for AMS with acute hypoxic/hypercarbic respiratory failure and hypotension requiring initiation of BiPAP and pressors.  Much improved following nasal airway and BiPAP with immediate achievement of hemodynamic stability - now with improved mentation. Attempted time off of BiPap, but with slowly rising CO2 back to 100's with altered mentation. Patient initially refused BiPap with understanding that this could lead to his death, but has since agreed to use BiPap and has tolerated well. In anticipation for the potential that Clarke may deny BiPap for his care, a psychiatry consult was placed to see if Clarke was decisional in the setting of his untreated depression. Per psychiatry, he is decisional. If patient denies BiPap, he agrees that pressor support would not be consistent and would choose to move towards comfort cares. If he is continuing on BiPap, then pressor support would be indicated.     Today:  -- patient would like to pursue anticoagulation - will just start warfarin (no heparin bridge per discussion with pharm)  -- avoid benzos if possible (ativan 0.5 mg in combination with hypercapnea results in poor mentation for him)  -- seen by Psychiatry, who believes patient is decisional for his choice to deny BiPap and aggressive cares  -- continue BiPap titrated to mentation. Patient okay with it now. If he ends up refusing BiPap, he agrees that he will not undergo pressor support hypotension.  -- will switch to metoprolol 10 mg q6h while NPO on BiPap. Can return to 100 mg metoprolol XR daily once mentating well  -- continue  "lasix 40 mg IV qday for gentle diuresis.  -- Palliative care to see patient.  -- transfer to the floor this afternoon.    Neurologic  # Hypercapnic Encephalopathy, improved with BiPap:  Acute mental status change early AM 9/24, found to be hypercarbic likely contributing etiology. Now more awake and alert following non invasive positive pressure ventilation with BiPAP. Did consider worsening sepsis in context of treatment for cellulitis; iatrogenic (but no altering medications received). Uptrending hypercapnia throughout the day off BiPap. Likely due to THONG and OHS perhaps worsened in setting of HF exacerbation.  - Monitor mental status  - ABG if mental status change  - will adjust BiPap settings to goal CO2 of ~65    # Fall, stable:  Per HPI patient did not report any prodromal symptoms.  No loss of consciousness.  Presumed to be a mechanical fall, however, in history, appears to have had worsening feeling in feet over the past week which may have contributed.   Troponin negative x 3, thus unlikely to represent ACT.  CT obtained on admission did note small perisplenic fluid collection, negative for PE.  - likely contribution from underlying peripheral neuropathy due to PVD and DM  - discussed with vascular surgery and no intervention at this time  - PT/OT following  - SW following        # Depression:  History of Depression and PTSD with limited social support. Declined therapy and in hospital chaplaincy services. No antidepressants at home. Ongoing concern for recent DNR/DNI code status change during this hospitalization with expressions of passive suicidality - wanting to avoid BiPap even though he understands it is a life sustaining treatment. Reports - \"the life that I live is not a life worth living.\"  - will discuss with psychiatry and have evaluate - determined patient is decisional.  - patient declines therapy  - will have palliative care involvement    Cardiovascular  # Atrial Fibrillation with RVR, " improving  History of A fib in the past, not anticoagulated at home but on metoprolol for rate control. CHADS-VaSC 5. Now on prophylactic Lovenox (not treatment dose), metoprolol. Received loading dose of amiodarone and started on amiodarone drip on arrival to MICU with the initial thought that his afib with RVR was causing his hypotension. Since correcting his underlying respiratory failure, he has now improved though due to persistent BiPap will switch regimen to IV metoprolol until able to take PO.  - s/p amiodarone load and 6 hour amiodarone drip.  - metoprolol 100 mg qday -> metoprolol IV 10 mg q6h  - will initiate warfarin today - patient okay with anticoagulation.     # History of HFrEF  # Pulmonary Edema  # CHF exacerbation  History of coronary artery disease/peripheral vascular disease per patient on admission. Last Echo 2008 with EF 40-45%. Currently on lisinopril, HCTZ, Metoprolol at home. Evidence of pulmonary edema on CXR on admission to MICU, possible worsening pulmonary edema contributing to hypercapnea and decreased ventilation. Echocardiogram 9/24 LVEF appeared normal; RV mildly reduced, IVC 3.34 without respiratory variability (though unclear if BiPap was on at the time)  - Holding HCTZ in the setting of loop diuretic administration  - continue daily diuresis with 40 mg IV lasix  - Fluid restriction <1.5 L per day     # Peripheral Vascular Disease  # Venous Stasis   Patient reports worsening numbness/loss of sensation in his feet bilaterally over the last week, acutely worsening since admission. Weakly dopplerable pulses. Sensation and motor function intact. Concern for possible acute arterial occlusion in the setting of atrial fibrillation vs acutely worsening peripheral vascular disease vs progression of probable diabetic neuropathy. Found to have right anterior tibial and dorsalis pedis occlusions though with collaterals - no acute limb ischemia.  - Vascular surgery consulted   - elevate right  leg, VCIK with toe pressures as outpatient, lymphedema wraps  - qshift dopplers     # Dyspnea  # Atypical Chest Pain, improving  Developed left sided chest pain and shortness of breath after fall from chair. History of CAD/PVD per patient, not documented in EMR. Troponins x3 negative, D-dimer elevated but CT PE study negative for embolism. Pain not improved with nitroglycerin. CK negative, rhabdo unlikely (normal renal function, potassium). Attributable to musculoskeletal at this point with dyspnea likely from HF exacerbation in setting of dilated IVC without variability with respirations.  - Cardiorespiratory monitoring  - Continue PTA lipitor   - Continue ASA 81mg q day  - daily diuresis 40 mg lasix.  - Metoprolol as above     # Hypertension   On lisinopril at home given history of hypertension and DMII. Creatinine normal on admission. Now with improved hypotension.  - Continue lisinopril 10 mg q day     Respiratory  # Acute on Chronic Hypercapnic Respiratory Failure  # Evidence of Pulmonary Hypertension  Developed AMS, A fib with RVR to 150-160s and respiratory distress early AM 9/24, placed on BiPAP with development of acute hypotension SBP 50s. ABG with pH 7.14, CO2 113. Repeat ABG with improvement in pH to 7.29, CO2 66. No concerns for hypoxemia. Initially taking poor Vt, this improved with jaw thrust manuevers with nasal trumpy placed, likely component of airway obstruction given neck habitus. CXR without pneumothorax. Respiratory failure likely attributable to worsening pulmonary edema, hypotension multifactorial in the setting of a fib with RVR and positive airway ventilation, possibly reducing preload further. Likely with component of THONG with Obesity Hypoventilation Syndrome with BMI of 62. Polycythemia is likely representative of chronic hypoxia in setting of THONG/OHS.  - Continue BiPAP, O2 to keep saturations > 90%  - will titrate BiPAP overnight with pre and post ABG's.  - Diuresis as above - will do 40  mg qday lasix.  - Respiratory viral panel negative     Gastrointestinal  # Perisplenic Free Fluid  Noted on CT Chest in the ED 9/24, splenic laceration considered given fall. However, Hgb has remained stable, no increase in abdominal distension, no abdominal pain. Unlikely contributing to respiratory decompensation. Per surgery, as hemoglobin stable for 24 hours post-fall, can restart anticoagulation whenever able, but would like to address this with patient once mentating appropriately.  - Monitor, daily abdominal exams  - Daily CBC  - Low threshold to reimage if concern for splenic hemorrhage   - General surgery consulted, appreciate recs as above  - okay to start anticoagulation if patient wishes (heparin ggt with bridge to warfarin?)     Nutrition:  Can reinitiate cards diet once off BiPap and mentating     Genitourinary  Bazzi catheter placed for strict I/O's while on pressors/diuresing.      Infectious Disease  # Lower Extremity Cellulitis   History of CAD per patient, noted to have venous stasis changes with erythema and warmth bilaterally on admission concerning for cellulitis, started on Cephalexin for treatment of soft tissue infection. Hypotension unlikely related to blood stream infection related to this given acute resolution, normal lactate, absence of other infectious symptoms (fever).   - WOC and lymphedema following  - Cephalexin changed to Cefazolin for IV administration at this time, can transition to cephalexin.  - Low threshold for blood cultures/lactate (normal lactic on admission)     Antibiotic History:  Cephalexin (9/23-9/24)  Cefazolin (9/24-present)     Hematology  # Polycythemia  Hgb 17.2 on admission, now 18.6. Likely becoming more concentrated given diuresis. Polycythemia likely indicative of underlying obstructive hypercapnia, THONG vs. Obesity hypoventilation. Reassuring that he is not experiencing acute hemorrhage precipitating his respiratory distress.   - CBC daily      Endocrine  #  DMII  On Metformin at home. HgbA1c 6.5 on 8/14/17.  - Low dose SSI     # Hypothyroidism  TSH on admission 3.11, on Synthroid 150 mcg at home  - Continue synthroid 150 mcg      Renal/Electolytes/FEN  # Hyponatremia, improved.  Initially concerning for hypervolemia in the setting of pulmonary edema and possible HF exacerbation. Urine Osms 161, although difficult to interpret in the setting of diuresis. Improved from 125 -> 130 -> 135, will slow rate of correction d/t concern for precipitating CPM. S/p 2 L NS bolus, 1 L LR bolus.   - will continue to monitor sodium as rate of correction appropriate in the past 48 hours.  - will continue with diuresis  - Daily CMP  - Continue to follow, expect to improve with diuresis      Skin Care  #Cellulitis  #Lower Extremity Venous Stasis   Management as above, routine skin/wound care per nursing, wound and lymphedema following.      DVT PPX: Lovenox, avoid SCDs d/t cellulitis   GI PPX: none  CODE: DNR/DNI - okay for pressors if accepting of BiPap. If refusing BiPap, would move to comfort cares. Psychiatry believes patient is decisional.   Family: POC is Janice Hillman [phone].    Patient was seen and discussed with attending physician Dr. Franco, who agrees with above assessment and plan.    Andre Ortega MD  Medicine-Pediatrics PGY3  [phone]    Interval History:     Worsening hypercarbia overnight with long period of unresponsiveness following ativan. Likely combination of CO2 retention with the ativan. Otherwise, now stable on BiPap with titration to mentation - currently at 20/8 and able to overall mentate well.  Discussed in depth goals of care as above. No complaints of pain at this time.     4 Point Review of systems including CV, Respiratory, GI and  were negative unless otherwise noted.    PHYSICAL EXAM  Temp:  [97.9  F (36.6  C)-99.9  F (37.7  C)] 98  F (36.7  C)  Heart Rate:  [] 90  Resp:  [13-19] 16  BP: ()/(44-80) 105/69  FiO2 (%):  [40 %-100 %]  50 %  SpO2:  [88 %-100 %] 97 %  I/O last 3 completed shifts:  In: 1010 [I.V.:1010]  Out: 3350 [Urine:3350]    General: Somnolent this am, moaning, not conversive d/t BiPAP, but reports that he is comfortable with it in place.  Neuro: CN II-XII intact,  HEENT: NC/AT, PERRL b/l,  sclera anicteric. Large neck girth  Pulmonary: Coarse breath sounds anteriorly, no rales/rhonchi/wheezes  Cardiovascular: irregular rate and irregular rhythm with rapid ventricular rhythm, normal S1 and S2, no murmurs appreciated. Heart sounds distant. DP pulses dopplerable but weak, marked bilaterally.  Abdomen: obese, soft, nontender, nondistended, no organomegaly, + bowel sounds  Extremity: warm and well perfused, edema to the knee bilaterally.   Skin: Bilateral woody changes in the lower extremities consistent with venous stasis, erythema bilaterally with warmth, onychomycosis bilaterally in toes.     Drains and Tubes: escalona   Intravascular Access and Device: 3xPIV; 1xPICC    DIAGNOSTIC STUDIES  ROUTINE ICU LABS (Last four results)  CMP    Recent Labs  Lab 09/25/17  0400 09/24/17  0430 09/23/17 2022 09/23/17  1049    130*  --  125*   POTASSIUM 3.2* 4.0  --  3.8   CHLORIDE 91* 89*  --  87*   CO2 36* 26  --  29   ANIONGAP 8 15*  --  10   GLC 85 135*  --  120*   BUN 7 11  --  10   CR 0.76 1.02 0.76 0.77   GFRESTIMATED >90 73 >90 >90   GFRESTBLACK >90 88 >90 >90   CHERI 8.2* 8.4*  --  8.2*   MAG 1.8  --   --  1.5*   PHOS 3.3  --   --   --    PROTTOTAL  --   --   --  5.6*   ALBUMIN  --   --   --  2.6*   BILITOTAL  --   --   --  1.0   ALKPHOS  --   --   --  70   AST  --   --   --  17   ALT  --   --   --  21     CBC    Recent Labs  Lab 09/25/17  0400 09/24/17  1507 09/24/17  1418 09/24/17  0430   WBC 9.8 9.8 Unsatisfactory specimen - contaminated 13.2*   RBC 5.43 5.26 Unsatisfactory specimen - contaminated 5.89   HGB 16.8 16.1 Unsatisfactory specimen - contaminated 18.6*   HCT 53.1* 50.5 Unsatisfactory specimen - contaminated 57.7*   MCV 98  96 Unsatisfactory specimen - contaminated 98   MCH 30.9 30.6 Unsatisfactory specimen - contaminated 31.6   MCHC 31.6 31.9 Unsatisfactory specimen - contaminated 32.2   RDW 14.2 13.9 Unsatisfactory specimen - contaminated 14.1    169 Unsatisfactory specimen - contaminated 163     INRNo lab results found in last 7 days.  Arterial Blood Gas  Recent Labs  Lab 09/25/17  0952 09/25/17  0823 09/25/17  0400 09/24/17  2300  09/24/17  0709 09/24/17  0530 09/24/17  0502   PH  --   --   --   --   --  7.29* 7.14* Canceled, Test credited   PCO2  --   --   --   --   --  66* 112* Canceled, Test credited   PO2  --   --   --   --   --  122* 58* Canceled, Test credited   HCO3  --   --   --   --   --  32* 38* Canceled, Test credited   O2PER 50 50 50 40  < > 80.0 60.0 60.0  Canceled, Test credited   < > = values in this interval not displayed.    MICROBIOLOGY    Culture Micro   Date Value Ref Range Status   09/23/2017 No growth after 2 days  Preliminary   09/23/2017 No growth after 2 days  Preliminary   02/26/2008 No growth  Final   02/26/2008 No growth  Final   02/26/2008 No growth  Final   01/25/2008 Light growth Aspergillus niger  Final     Recent Labs   Lab Test  09/23/17 2023 09/23/17 2022   CULT  No growth after 2 days  No growth after 2 days   SDES  Blood Right Hand  Blood Left Hand     Urine Studies:    Recent Labs   Lab Test  09/23/17   1900   LEUKEST  Negative   NITRITE  Negative   WBCU  <1   RBCU  0     IMAGING  Recent Results (from the past 24 hour(s))   XR Chest Port 1 View    Narrative    Exam:  XR CHEST PORT 1 VW, 9/24/2017 7:47 AM    History: Acute hypoxic hypercarbic respiratory failure    Comparison:  Chest radiograph from 9/23/2017, and chest CT from the  23rd 2017.    Findings:  Single AP view of chest. Low lung volumes. The  cardiomediastinal silhouette is within normal limits given technique.  Unchanged small bilateral layering pleural effusions and associated  basilar opacities. Stable bilateral  opacities. No pneumothorax.  Visualized upper abdomen and osseous structures are unremarkable.      Impression    Impression:  Stable small bilateral pleural effusions and basilar  predominant opacities, likely atelectasis.    I have personally reviewed the examination and initial interpretation  and I agree with the findings.    ARCHIE HI MD ([place])   XR Chest Port 1 View    Narrative    Exam:  XR CHEST PORT 1 VW, 9/24/2017 9:56 AM    History: RN placed PICC - verify tip placement    Comparison:  Chest radiograph from 0741 hours on 9/24/2017.    Findings:  Single AP view of the chest. Right upper extremity PICC tip  projects over the high SVC. Stable enlargement of the  cardiomediastinal silhouette which may be related to technique.  Improved small bilateral layering pleural effusions and adjacent  basilar opacities. Unchanged hazy perihilar and mixed interstitial and  airspace opacities. Visualized upper abdomen and osseous structures  are unremarkable.      Impression    Impression:    1. Right upper extremity PICC tip projects over the high SVC.  2. Improved small bilateral pleural effusions and adjacent basilar  atelectasis and/or consolidation.  3. Stable prominent cardiomediastinal silhouette and pulmonary  vascular congestion.     I have personally reviewed the examination and initial interpretation  and I agree with the findings.    ARCHIE HI MD ([place])   US Lower Extremity Arterial Duplex Bilateral    Narrative    Exam: Duplex ultrasound of bilateral lower extremity arteries dated  9/24/2017 11:40 AM     Clinical information: new pulseless lower extremities, unable to  doppler.     Comparison: None available.    Technique: Includes Gray Scale images, color Doppler, spectral Doppler  waveforms and velocities with appropriate angles of 60 degrees or  less.    Findings:     Right lower extremity:     Common femoral artery: 127 cm/sec. Waveforms: Biphasic  Deep femoral artery: 79 cm/sec. Waveforms:  Biphasic  Proximal SFA: 124 cm/sec. Waveforms: Biphasic  Mid SFA: 166 cm/sec. Waveforms: Biphasic  Distal SFA: 115 cm/sec. Waveforms: Biphasic  Popliteal artery: 73 cm/sec. Waveforms: Biphasic  PTA ankle: 14 cm/sec. Waveforms: Monophasic high resistance  NIKKI ankle: Anterior tibial artery and dorsalis pedis artery not  identified at the ankle.    Left lower extremity:    Common femoral artery: 129 cm/sec. Waveforms: Biphasic  Deep femoral artery: 84 cm/sec. Waveforms: Biphasic  Proximal SFA: 116 cm/sec. Waveforms: Biphasic  Mid SFA: 137 cm/sec. Waveforms: Biphasic  Distal SFA: 150 cm/sec. Waveforms: Biphasic  Popliteal artery: 83 cm/sec. Waveforms: Biphasic  PTA ankle: 11 cm/sec. Waveforms: Biphasic  NIKKI ankle: 18 cm/sec. Waveforms: Biphasic      Impression    Impression:   1. Right leg: The anterior tibial artery at the ankle and dorsalis  pedis are not identified and may be occluded. The remainder of the  arteries of the right lower extremity are patent with triphasic  waveforms to the level of the popliteal artery.  2. Left leg: The arteries of the left lower extremity are patent with  triphasic waveforms.    Guidelines:  [facility] duplex criteria for lower limb arterial  occlusive disease  -Percent stenosis- Normal (1-19%): Peak systolic velocity (cm/s):  <150, End-diastolic velocity (cm/s): <40, Velocity ration (Vr): <1.5,  Distal arterial waveform: Triphasic  -Percent stenosis- 20-49%: Peak systolic velocity (cm/s): 150-200,  End-diastolic velocity (cm/s): <40, Velocity ration (Vr): 1.5-2.0,  Distal arterial waveform: Triphasic  -Percent stenosis- 50-75%: Peak systolic velocity (cm/s): 200-300,  End-diastolic velocity (cm/s): <90, Velocity ration (Vr): 2.0-3.9,  Distal arterial waveform: Poststenotic turbulence distal to stenosis,  monophasic distal waveform  -Percent stenosis- >75%: Peak systolic velocity (cm/s): >300,  End-diastolic velocity (cm/s): <90, Velocity ration (Vr): >4.0,  Distal  arterial waveform: Dampened distal waveform and low PSV/EDV* in the  stenosis  -Percent stenosis- Occlusion: Absent flow by color Doppler/pulsed  Doppler spectral analysis; length of occlusion estimated from distance  between exit and reentry collateral arteries  *PSV = peak systolic velocity, EDV = end-diastolic velocity  http://link.adorno.com/chapter/10.1007/[email or web address]/fulltext  html    I have personally reviewed the examination and initial interpretation  and I agree with the findings.    ARCHIE HI MD ([place])

## 2017-09-25 NOTE — PHARMACY-ANTICOAGULATION SERVICE
Clinical Pharmacy - Warfarin Dosing Consult     Pharmacy has been consulted to manage this patient s warfarin therapy.  Indication: Atrial Fibrillation  Therapy Goal: INR 2-3  Provider/Team: MICU  Warfarin Prior to Admission: No  Significant drug interactions: aspirin, atorvastatin, enoxaparin (as bridge therapy), levothyrpxine  Recent documented change in oral intake/nutrition: No    INR   Date Value Ref Range Status   09/25/2017 1.31 (H) 0.86 - 1.14 Final   03/18/2008 1.10 0.86 - 1.14 Final       Recommend warfarin 7.5 mg today.  Pharmacy will monitor Clarke Moreira daily and order warfarin doses to achieve specified goal.      Please contact pharmacy as soon as possible if the warfarin needs to be held for a procedure or if the warfarin goals change.      Lisseth Benavides, MirtaD

## 2017-09-26 ENCOUNTER — APPOINTMENT (OUTPATIENT)
Dept: GENERAL RADIOLOGY | Facility: CLINIC | Age: 67
DRG: 291 | End: 2017-09-26
Payer: COMMERCIAL

## 2017-09-26 LAB
ANION GAP SERPL CALCULATED.3IONS-SCNC: 9 MMOL/L (ref 3–14)
BASE EXCESS BLDV CALC-SCNC: 10.6 MMOL/L
BASE EXCESS BLDV CALC-SCNC: 11.8 MMOL/L
BUN SERPL-MCNC: 7 MG/DL (ref 7–30)
CALCIUM SERPL-MCNC: 8 MG/DL (ref 8.5–10.1)
CHLORIDE SERPL-SCNC: 90 MMOL/L (ref 94–109)
CO2 SERPL-SCNC: 37 MMOL/L (ref 20–32)
CREAT SERPL-MCNC: 0.7 MG/DL (ref 0.66–1.25)
ERYTHROCYTE [DISTWIDTH] IN BLOOD BY AUTOMATED COUNT: 14.1 % (ref 10–15)
GFR SERPL CREATININE-BSD FRML MDRD: >90 ML/MIN/1.7M2
GLUCOSE BLDC GLUCOMTR-MCNC: 106 MG/DL (ref 70–99)
GLUCOSE BLDC GLUCOMTR-MCNC: 107 MG/DL (ref 70–99)
GLUCOSE BLDC GLUCOMTR-MCNC: 116 MG/DL (ref 70–99)
GLUCOSE BLDC GLUCOMTR-MCNC: 92 MG/DL (ref 70–99)
GLUCOSE BLDC GLUCOMTR-MCNC: 93 MG/DL (ref 70–99)
GLUCOSE SERPL-MCNC: 81 MG/DL (ref 70–99)
HCO3 BLDV-SCNC: 40 MMOL/L (ref 21–28)
HCO3 BLDV-SCNC: 42 MMOL/L (ref 21–28)
HCT VFR BLD AUTO: 49.1 % (ref 40–53)
HGB BLD-MCNC: 15.3 G/DL (ref 13.3–17.7)
INR PPP: 1.33 (ref 0.86–1.14)
MAGNESIUM SERPL-MCNC: 1.7 MG/DL (ref 1.6–2.3)
MAGNESIUM SERPL-MCNC: 1.7 MG/DL (ref 1.6–2.3)
MCH RBC QN AUTO: 30.5 PG (ref 26.5–33)
MCHC RBC AUTO-ENTMCNC: 31.2 G/DL (ref 31.5–36.5)
MCV RBC AUTO: 98 FL (ref 78–100)
O2/TOTAL GAS SETTING VFR VENT: 100 %
O2/TOTAL GAS SETTING VFR VENT: 50 %
OXYHGB MFR BLDV: 88 %
PCO2 BLDV: 73 MM HG (ref 40–50)
PCO2 BLDV: 80 MM HG (ref 40–50)
PH BLDV: 7.33 PH (ref 7.32–7.43)
PH BLDV: 7.35 PH (ref 7.32–7.43)
PHOSPHATE SERPL-MCNC: 2.2 MG/DL (ref 2.5–4.5)
PHOSPHATE SERPL-MCNC: 3.1 MG/DL (ref 2.5–4.5)
PLATELET # BLD AUTO: 149 10E9/L (ref 150–450)
PO2 BLDV: 50 MM HG (ref 25–47)
PO2 BLDV: 60 MM HG (ref 25–47)
POTASSIUM SERPL-SCNC: 3.2 MMOL/L (ref 3.4–5.3)
POTASSIUM SERPL-SCNC: 3.7 MMOL/L (ref 3.4–5.3)
RBC # BLD AUTO: 5.02 10E12/L (ref 4.4–5.9)
SODIUM SERPL-SCNC: 136 MMOL/L (ref 133–144)
WBC # BLD AUTO: 9.7 10E9/L (ref 4–11)

## 2017-09-26 PROCEDURE — 99233 SBSQ HOSP IP/OBS HIGH 50: CPT | Performed by: HOSPITALIST

## 2017-09-26 PROCEDURE — 25000132 ZZH RX MED GY IP 250 OP 250 PS 637: Performed by: INTERNAL MEDICINE

## 2017-09-26 PROCEDURE — 83735 ASSAY OF MAGNESIUM: CPT | Performed by: INTERNAL MEDICINE

## 2017-09-26 PROCEDURE — 25000125 ZZHC RX 250: Performed by: STUDENT IN AN ORGANIZED HEALTH CARE EDUCATION/TRAINING PROGRAM

## 2017-09-26 PROCEDURE — 84100 ASSAY OF PHOSPHORUS: CPT | Performed by: INTERNAL MEDICINE

## 2017-09-26 PROCEDURE — 85610 PROTHROMBIN TIME: CPT | Performed by: INTERNAL MEDICINE

## 2017-09-26 PROCEDURE — 80048 BASIC METABOLIC PNL TOTAL CA: CPT | Performed by: INTERNAL MEDICINE

## 2017-09-26 PROCEDURE — 40000047 ZZH STATISTIC CTO2 CONT OXYGEN TECH TIME EA 90 MIN

## 2017-09-26 PROCEDURE — 99233 SBSQ HOSP IP/OBS HIGH 50: CPT | Mod: GC | Performed by: INTERNAL MEDICINE

## 2017-09-26 PROCEDURE — 82803 BLOOD GASES ANY COMBINATION: CPT | Performed by: STUDENT IN AN ORGANIZED HEALTH CARE EDUCATION/TRAINING PROGRAM

## 2017-09-26 PROCEDURE — 40000275 ZZH STATISTIC RCP TIME EA 10 MIN

## 2017-09-26 PROCEDURE — 82805 BLOOD GASES W/O2 SATURATION: CPT | Performed by: STUDENT IN AN ORGANIZED HEALTH CARE EDUCATION/TRAINING PROGRAM

## 2017-09-26 PROCEDURE — 25000132 ZZH RX MED GY IP 250 OP 250 PS 637: Performed by: HOSPITALIST

## 2017-09-26 PROCEDURE — 94640 AIRWAY INHALATION TREATMENT: CPT | Mod: 76

## 2017-09-26 PROCEDURE — 71010 XR CHEST PORT 1 VW: CPT

## 2017-09-26 PROCEDURE — 94640 AIRWAY INHALATION TREATMENT: CPT

## 2017-09-26 PROCEDURE — 84100 ASSAY OF PHOSPHORUS: CPT | Performed by: STUDENT IN AN ORGANIZED HEALTH CARE EDUCATION/TRAINING PROGRAM

## 2017-09-26 PROCEDURE — 12000006 ZZH R&B IMCU INTERMEDIATE UMMC

## 2017-09-26 PROCEDURE — 00000146 ZZHCL STATISTIC GLUCOSE BY METER IP

## 2017-09-26 PROCEDURE — 25000125 ZZHC RX 250: Performed by: INTERNAL MEDICINE

## 2017-09-26 PROCEDURE — 84132 ASSAY OF SERUM POTASSIUM: CPT | Performed by: INTERNAL MEDICINE

## 2017-09-26 PROCEDURE — 94660 CPAP INITIATION&MGMT: CPT

## 2017-09-26 PROCEDURE — 25000128 H RX IP 250 OP 636: Performed by: INTERNAL MEDICINE

## 2017-09-26 PROCEDURE — 25000132 ZZH RX MED GY IP 250 OP 250 PS 637

## 2017-09-26 PROCEDURE — 85027 COMPLETE CBC AUTOMATED: CPT | Performed by: INTERNAL MEDICINE

## 2017-09-26 PROCEDURE — 25000132 ZZH RX MED GY IP 250 OP 250 PS 637: Performed by: STUDENT IN AN ORGANIZED HEALTH CARE EDUCATION/TRAINING PROGRAM

## 2017-09-26 PROCEDURE — 25000128 H RX IP 250 OP 636: Performed by: STUDENT IN AN ORGANIZED HEALTH CARE EDUCATION/TRAINING PROGRAM

## 2017-09-26 RX ORDER — IPRATROPIUM BROMIDE AND ALBUTEROL SULFATE 2.5; .5 MG/3ML; MG/3ML
3 SOLUTION RESPIRATORY (INHALATION)
Status: DISCONTINUED | OUTPATIENT
Start: 2017-09-26 | End: 2017-09-29

## 2017-09-26 RX ORDER — METOPROLOL SUCCINATE 50 MG/1
100 TABLET, EXTENDED RELEASE ORAL 2 TIMES DAILY
Status: DISCONTINUED | OUTPATIENT
Start: 2017-09-27 | End: 2017-09-26

## 2017-09-26 RX ORDER — METOPROLOL SUCCINATE 25 MG/1
100 TABLET, EXTENDED RELEASE ORAL DAILY
Status: DISCONTINUED | OUTPATIENT
Start: 2017-09-26 | End: 2017-09-29

## 2017-09-26 RX ORDER — POLYETHYLENE GLYCOL 3350 17 G/17G
17 POWDER, FOR SOLUTION ORAL DAILY
Status: DISCONTINUED | OUTPATIENT
Start: 2017-09-26 | End: 2017-10-08 | Stop reason: HOSPADM

## 2017-09-26 RX ORDER — AMOXICILLIN 250 MG
2 CAPSULE ORAL 2 TIMES DAILY
Status: DISCONTINUED | OUTPATIENT
Start: 2017-09-26 | End: 2017-10-08 | Stop reason: HOSPADM

## 2017-09-26 RX ORDER — METOPROLOL SUCCINATE 25 MG/1
50 TABLET, EXTENDED RELEASE ORAL DAILY
Status: DISCONTINUED | OUTPATIENT
Start: 2017-09-26 | End: 2017-09-27

## 2017-09-26 RX ADMIN — POTASSIUM CHLORIDE 10 MEQ: 7.46 INJECTION, SOLUTION INTRAVENOUS at 06:42

## 2017-09-26 RX ADMIN — SODIUM CHLORIDE, PRESERVATIVE FREE 5 ML: 5 INJECTION INTRAVENOUS at 19:19

## 2017-09-26 RX ADMIN — SENNOSIDES AND DOCUSATE SODIUM 2 TABLET: 8.6; 5 TABLET ORAL at 19:19

## 2017-09-26 RX ADMIN — WARFARIN SODIUM 7.5 MG: 2.5 TABLET ORAL at 18:23

## 2017-09-26 RX ADMIN — METOPROLOL TARTRATE 10 MG: 5 INJECTION INTRAVENOUS at 01:17

## 2017-09-26 RX ADMIN — CEFAZOLIN SODIUM 2 G: 2 INJECTION, SOLUTION INTRAVENOUS at 16:08

## 2017-09-26 RX ADMIN — LEVOTHYROXINE SODIUM 150 MCG: 25 TABLET ORAL at 08:05

## 2017-09-26 RX ADMIN — Medication 1 G: at 08:03

## 2017-09-26 RX ADMIN — LISINOPRIL 10 MG: 10 TABLET ORAL at 08:03

## 2017-09-26 RX ADMIN — METOPROLOL TARTRATE 10 MG: 5 INJECTION INTRAVENOUS at 06:54

## 2017-09-26 RX ADMIN — MULTIPLE VITAMINS W/ MINERALS TAB 1 TABLET: TAB at 08:03

## 2017-09-26 RX ADMIN — SENNOSIDES AND DOCUSATE SODIUM 1 TABLET: 8.6; 5 TABLET ORAL at 08:03

## 2017-09-26 RX ADMIN — Medication 13 ML: at 19:19

## 2017-09-26 RX ADMIN — Medication 13 ML: at 11:06

## 2017-09-26 RX ADMIN — IPRATROPIUM BROMIDE AND ALBUTEROL SULFATE 3 ML: .5; 3 SOLUTION RESPIRATORY (INHALATION) at 15:06

## 2017-09-26 RX ADMIN — METOPROLOL SUCCINATE 100 MG: 50 TABLET, EXTENDED RELEASE ORAL at 13:17

## 2017-09-26 RX ADMIN — POTASSIUM CHLORIDE 10 MEQ: 7.46 INJECTION, SOLUTION INTRAVENOUS at 07:30

## 2017-09-26 RX ADMIN — POLYETHYLENE GLYCOL 3350 17 G: 17 POWDER, FOR SOLUTION ORAL at 16:08

## 2017-09-26 RX ADMIN — METOPROLOL SUCCINATE 50 MG: 50 TABLET, EXTENDED RELEASE ORAL at 13:18

## 2017-09-26 RX ADMIN — ASPIRIN 81 MG CHEWABLE TABLET 81 MG: 81 TABLET CHEWABLE at 08:03

## 2017-09-26 RX ADMIN — CEFAZOLIN SODIUM 2 G: 2 INJECTION, SOLUTION INTRAVENOUS at 03:25

## 2017-09-26 RX ADMIN — ATORVASTATIN CALCIUM 40 MG: 40 TABLET, FILM COATED ORAL at 08:03

## 2017-09-26 RX ADMIN — POTASSIUM CHLORIDE 10 MEQ: 7.46 INJECTION, SOLUTION INTRAVENOUS at 04:49

## 2017-09-26 RX ADMIN — FUROSEMIDE 40 MG: 10 INJECTION, SOLUTION INTRAVENOUS at 08:06

## 2017-09-26 RX ADMIN — IPRATROPIUM BROMIDE AND ALBUTEROL SULFATE 3 ML: .5; 3 SOLUTION RESPIRATORY (INHALATION) at 20:52

## 2017-09-26 RX ADMIN — POTASSIUM PHOSPHATE, MONOBASIC AND POTASSIUM PHOSPHATE, DIBASIC 20 MMOL: 224; 236 INJECTION, SOLUTION INTRAVENOUS at 11:06

## 2017-09-26 RX ADMIN — POTASSIUM CHLORIDE 10 MEQ: 7.46 INJECTION, SOLUTION INTRAVENOUS at 05:49

## 2017-09-26 NOTE — PLAN OF CARE
Problem: Patient Care Overview  Goal: Plan of Care/Patient Progress Review  Outcome: No Change  D: Fall, new onset Afib/RVR, hypoventilation/hypercapnia.  A/I: A&O x 4, very pleasant and cooperative. Denies pain. HR  Afib with occasional PVCs. BPs soft, but hemodynamically stable, MAP remains > 60 mmHg (goal). Rate will increase to 130-150 preceding scheduled 10 mg IV metoprolol doses, then returns to baseline following metoprolol. Bazzi patent with adequate UOP, avg 35-40 mL/hr. No BM this shift, declined PM dose senna. Last BM unknown, likely pre-admission. Low Na/low fat/low cholesterol diet with 1.5L fluid restriction. ACHS glucose checks. Very poor mobility, requires 2-3 assist with mechanical lift. Pt states he is very sedentary at home. Pain with movement of BLEs, moderate venous stasis wounds and foot ulcers- WOC following. Receiving 40 mg furosemide daily. K 3.2 this morning, in process of replacing with 40 mEq KCl.  P: Appropriate to transfer to lower level of care (6C) pending bed availability. Continue to monitor HR, hemodynamics, wounds. Possible comfort care order today. Continue plan of care.

## 2017-09-26 NOTE — PROGRESS NOTES
INTERNAL MEDICINE PROGRESS NOTE    Clarke Moreira ([id number]) admitted on 9/23/2017 09/26/2017    Assessment & Plan:  Clarke Sotelo is a 67 year old male with a history notable for morbid obesity, atrial fibrillation, HFrEF, diabetes, PVD, and untreated depression that was admitted following a fall and CP/SOB on 9/23.  Transferred to MICU on 9/24 for AMS with acute hypoxic/hypercarbic respiratory failure and hypotension requiring initiation of BiPAP and pressors.  Much improved following nasal airway and BiPAP, off pressors and transferred to general medicine for further management.     #Acute on Chronic Hypercapnic Respiratory Failure  #Evidence of Pulmonary Hypertension  #Probable Obesity hypoventilation syndrome  Developed AMS, A fib with RVR to 150-160s and respiratory distress early AM 9/24/17. Sudden decompensated respiratory status could have been secondary to flash pulmonary edema secondary to a-fib with RVR, exacerbation of an obstructive or restrictive lung pathology, infectious causes (although infectious respiratory panel negative) and worsening of obesity hypoventilation syndrome. ABGs demonstrated pH 7.14, CO2 113. Following BiPAP and airway management with nasal trumpet, his ABG improved with pH 7.29, CO2 66. No concerns for hypoxemia. Mental status improved significantly once pCO2 in the 60s range. However, when off BiPAP for prolonged time he re accumulates CO2 and becomes lethargic. Echo with mild reduction of RV function, could be secondary to underlying Pulmonary HTN in the setting of chronic obstructive or restrictive lung disease with OHS, he has  BMI of 62.   - Continue BiPAP, O2 to keep saturations > 88%  - Started DuoNebs QID  - PT ordered as pt sedentary   - Continue with lasix as below  - Incentive spirometry     #Atrial fibrillation with RVR  History of A fib in the past, not anticoagulated at home but on metoprolol for rate control. CHADS-VaSC 5. Received loading dose of  amiodarone and started on amiodarone load and 6hr drip on arrival to MICU when in afib with RVR and hypotension. However, his atrial fibrillation improved once respiratory status was stabilized. Rate is better controlled with metoprolol and has started anticoagulation therapy.  - Metoprolol IV switched to 100mg PO XL  - Will administer small IV doses while transitioning to PO  - On warfarin, will explore if agreeable to start NOAC    #Hypercapnic Encephalopathy - improving  Acute mental status change early AM 9/24 felt to be secondary to acute hypercarbic respiratory failure and acute respiratory acidosis. Mental status has improved once pCO2 in the 60s with BiPAP.  - Monitor mental status  - VBG if mental status change  - CO2 goal of ~65       #History of HFrEF   #Pulmonary Edema  #CHF exacerbation  History of coronary artery disease/peripheral vascular disease per patient on admission. Last Echo 2008 with EF 40-45%, but on 09/24/17 recheck was described as normal. There might be a diastolic component and most suitable be HFpEF. He is on lisinopril, HCTZ, Metoprolol at home. During respiratory decompensation he did have CXR showing pulmonary edema. Which could secondary to HF exacerbation in the setting of atrial fibrillation. RV mildly reduced, IVC 3.34 without respiratory variability.   - Holding HCTZ in the setting of loop diuretic administration  - continue daily diuresis with 40 mg IV lasix goal of negative 1L  - Fluid restriction <1.5 L per day      #Peripheral Vascular Disease  #Venous Stasis   Patient reported worsening numbness/loss of sensation in his feet bilaterally over the last week, acutely worsening since admission. No signs of limb ischemia, but US did reveal anterior tibial artery and dorsalis pedis occlusion. Sensation might be an issue of diabetic neuropathy as well.  - Continue with right leg elevation  - VICK with toe pressures as outpatient, lymphedema wraps  - Monitor for signs of acute leg  "ischemia    #Depression  History of Depression and PTSD with limited social support. Declined therapy and in hospital chaplaincy services. No antidepressants at home. Ongoing concern for recent DNR/DNI code status change during this hospitalization with expressions of passive suicidality - wanting to avoid BiPap even though he understands it is a life sustaining treatment. Reports - \"the life that I live is not a life worth living.\" Psychiatry evaluated patient and did not believe depression was factoring on DNI/DNR and possible comfort care decision. Palliative care is involved and at this moment patient is agreeable to continue management and optimization of respiratory status as it is improving.   - Continue address goals of care with patient  - Palliative care following    #Lower Extremity Cellulitis   Bilateral lower extremity with stasis dermatitis and ulcers. Area not tender, but erythematous and warm. Started on cephalosporin on 09/23/17.   - WOC and lymphedema following  - Transition to cephalexin on 09/27/17 if continues with improving respiratory status and not requiring continuous BiPAP  - Complete 7 day course on 09/29/17 and clinically reassess    #Perisplenic Free Fluid  Noted on CT Chest in the ED 9/24, splenic laceration and perisplenic fluid collection following fall. Hgb has remained stable, no increase in abdominal distension, no abdominal pain. Per surgery, as hemoglobin stable for 24 hours post-fall, can restart anticoagulation.  - Monitor, daily abdominal exams, no tenderness or signs of acute abdomen  - Daily CBC  - Low threshold to reimage if concern for splenic hemorrhage   - General surgery consulted, appreciate recs as above  - Started patient on warfarin    #Dyspnea  #Atypical Chest Pain, improving  Developed left sided chest pain and shortness of breath after fall from chair. History of CAD/PVD per patient, not documented in EMR. Troponins x3 negative, D-dimer elevated but CT PE study " negative for embolism. Pain not improved with nitroglycerin. CK negative, rhabdo unlikely (normal renal function, potassium). Attributable to musculoskeletal at this point with dyspnea likely from HF exacerbation in setting of dilated IVC without variability with respirations.  - Cardiorespiratory monitoring  - Continue PTA lipitor   - Continue ASA 81mg q day  - daily diuresis 40 mg lasix.  - Metoprolol as above       #Hypertension   On lisinopril at home given history of hypertension and DMII. Creatinine normal on admission. Now with improved hypotension.  - Continue lisinopril 10 mg q day as not on pressors    # Lower Extremity Cellulitis   History of CAD per patient, noted to have venous stasis changes with erythema and warmth bilaterally on admission concerning for cellulitis, started on Cephalexin for treatment of soft tissue infection. Hypotension unlikely related to blood stream infection related to this given acute resolution, normal lactate, absence of other infectious symptoms (fever).   - WOC and lymphedema following  - Cephalexin changed to Cefazolin for IV administration at this time, can transition to cephalexin.  - Low threshold for blood cultures/lactate (normal lactic on admission)      #Polycythemia   Hgb 17.2 on admission, peaked at 18.6. Could be secondary to hemoconcentration. Now normalized.       #DMII  On Metformin at home. HgbA1c 6.5 on 8/14/17.  - Low dose SSI      #Hypothyroidism  TSH on admission 3.11, on Synthroid 150 mcg at home  - Continue synthroid 150 mcg       #Hyponatremia, resolved.  - Will continue to monitor sodium as rate of correction appropriate in the past 48 hours.  - Will continue with diuresis  - Daily CMP  - Continue to follow, expect to improve with diuresis     #Hypophosphatemia  Could be secondary to increase respiratory effort.   - Replace PO4  - Monitor improvement      DVT PPX: on Warfarin   GI PPX: none  CODE: DNR/DNI -   Family: POC is Janice Kady  "[phone].      FEN: CHO consistent  Prophylaxis: DVT warfarin  Disposition: Will need optimization of respiratory status and PT for decompensation  Code Status: DNR/DNI    Roberto Alexander MD PhD  Internal Medicine PGY2  [phone]    Patient was seen and discussed with  who agrees with above assessment and plan.    ==================================================================    Interval HistorySubjective:  Lethargic overnight, but responded well BiPAP. Denies fever, chills, night sweats, headache, shortness of breath, chest pain, nausea, vomiting, diarrhea, constipation. More agreeable to optimizing respiratory status as opposed to comfort care.     Objective:  Most recent vital signs:  /41 (BP Location: Left arm)  Temp 97.9  F (36.6  C) (Axillary)  Resp 18  Ht 1.803 m (5' 11\")  Wt (!) 197.8 kg (436 lb)  SpO2 99%  BMI 60.81 kg/m2  Temp:  [97.6  F (36.4  C)-99.6  F (37.6  C)] 97.9  F (36.6  C)  Heart Rate:  [] 109  Resp:  [15-22] 18  BP: ()/(41-85) 126/41  FiO2 (%):  [50 %-80 %] 60 %  SpO2:  [93 %-100 %] 99 %  Wt Readings from Last 2 Encounters:   09/26/17 (!) 197.8 kg (436 lb)   08/14/17 (!) 191 kg (421 lb)       Intake/Output Summary (Last 24 hours) at 09/26/17 1426  Last data filed at 09/26/17 1400   Gross per 24 hour   Intake             1326 ml   Output             1580 ml   Net             -254 ml       Physical exam:  General: Patient lying comfortably in bed, NAD   HEENT: EOMI, PERRL, no scleral icterus or injection, no bruits appreciated, difficult to appreciate JVD due to body habitus, MMM  Cardiac: RRR, no m/r/g appreciated.   Respiratory: reduced lung sounds bilaterally across all fields, no wheezing or crackles  GI: Obese, NT, no organomegaly, no signs of acute abdomen  Extremities: +2 edema bilaterally, absent pulses in right dorsalis pedis and anterior tibialis, signs of digital ischemia vs chronic fungi infection   Skin: bilateral leg wound ulcers, erythematous, " non tender, no suppuration  Neuro: AOx3,  Moving all extremities sponteneously    Labs:  Results for orders placed or performed during the hospital encounter of 09/23/17 (from the past 24 hour(s))   INR   Result Value Ref Range    INR 1.31 (H) 0.86 - 1.14   Blood gas venous and oxyhgb   Result Value Ref Range    Ph Venous 7.55 (H) 7.32 - 7.43 pH    PCO2 Venous 47 40 - 50 mm Hg    PO2 Venous 29 25 - 47 mm Hg    Bicarbonate Venous 41 (H) 21 - 28 mmol/L    FIO2 50     Oxyhemoglobin Venous 68 %    Base Excess Venous 15.8 mmol/L   Potassium   Result Value Ref Range    Potassium 3.4 3.4 - 5.3 mmol/L   Blood gas venous and oxyhgb   Result Value Ref Range    Ph Venous 7.39 7.32 - 7.43 pH    PCO2 Venous 60 (H) 40 - 50 mm Hg    PO2 Venous 45 25 - 47 mm Hg    Bicarbonate Venous 37 (H) 21 - 28 mmol/L    FIO2 8LPM     Oxyhemoglobin Venous 82 %    Base Excess Venous 9.0 mmol/L   Glucose by meter   Result Value Ref Range    Glucose 82 70 - 99 mg/dL   Creatinine   Result Value Ref Range    Creatinine 0.66 0.66 - 1.25 mg/dL    GFR Estimate >90 >60 mL/min/1.7m2    GFR Estimate If Black >90 >60 mL/min/1.7m2   Blood gas venous   Result Value Ref Range    Ph Venous 7.50 (H) 7.32 - 7.43 pH    PCO2 Venous 50 40 - 50 mm Hg    PO2 Venous 32 25 - 47 mm Hg    Bicarbonate Venous 39 (H) 21 - 28 mmol/L    Base Excess Venous 13.6 mmol/L    FIO2 8L    CBC with platelets   Result Value Ref Range    WBC 9.7 4.0 - 11.0 10e9/L    RBC Count 5.02 4.4 - 5.9 10e12/L    Hemoglobin 15.3 13.3 - 17.7 g/dL    Hematocrit 49.1 40.0 - 53.0 %    MCV 98 78 - 100 fl    MCH 30.5 26.5 - 33.0 pg    MCHC 31.2 (L) 31.5 - 36.5 g/dL    RDW 14.1 10.0 - 15.0 %    Platelet Count 149 (L) 150 - 450 10e9/L   INR   Result Value Ref Range    INR 1.33 (H) 0.86 - 1.14   Magnesium (AM Draw)   Result Value Ref Range    Magnesium 1.7 1.6 - 2.3 mg/dL   Basic metabolic panel   Result Value Ref Range    Sodium 136 133 - 144 mmol/L    Potassium 3.2 (L) 3.4 - 5.3 mmol/L    Chloride 90 (L)  94 - 109 mmol/L    Carbon Dioxide 37 (H) 20 - 32 mmol/L    Anion Gap 9 3 - 14 mmol/L    Glucose 81 70 - 99 mg/dL    Urea Nitrogen 7 7 - 30 mg/dL    Creatinine 0.70 0.66 - 1.25 mg/dL    GFR Estimate >90 >60 mL/min/1.7m2    GFR Estimate If Black >90 >60 mL/min/1.7m2    Calcium 8.0 (L) 8.5 - 10.1 mg/dL   Blood gas venous   Result Value Ref Range    Ph Venous 7.33 7.32 - 7.43 pH    PCO2 Venous 80 (HH) 40 - 50 mm Hg    PO2 Venous 50 (H) 25 - 47 mm Hg    Bicarbonate Venous 42 (H) 21 - 28 mmol/L    Base Excess Venous 11.8 mmol/L    FIO2 50.0    Phosphorus   Result Value Ref Range    Phosphorus 2.2 (L) 2.5 - 4.5 mg/dL   Glucose by meter   Result Value Ref Range    Glucose 93 70 - 99 mg/dL   Blood gas venous and oxyhgb   Result Value Ref Range    Ph Venous 7.35 7.32 - 7.43 pH    PCO2 Venous 73 (H) 40 - 50 mm Hg    PO2 Venous 60 (H) 25 - 47 mm Hg    Bicarbonate Venous 40 (H) 21 - 28 mmol/L    FIO2 100.0     Oxyhemoglobin Venous 88 %    Base Excess Venous 10.6 mmol/L   Glucose by meter   Result Value Ref Range    Glucose 92 70 - 99 mg/dL   XR Chest Port 1 View    Narrative    Chest one view portable upright    HISTORY: Shortness of breath    COMPARISON STUDY: 9/24/2017    FINDINGS: Cardiac silhouette is nonenlarged. Pulmonary vascularity is  engorged. Bilateral pleural effusions with bibasilar atelectasis and  consolidation. Right PICC with tip in the mid SVC. Old left-sided rib  fractures.      Impression    IMPRESSION: Pulmonary vascular congestion and bibasilar atelectasis  and/or pneumonia    SOL CRAIG MD   Magnesium   Result Value Ref Range    Magnesium 1.7 1.6 - 2.3 mg/dL   Potassium   Result Value Ref Range    Potassium 3.7 3.4 - 5.3 mmol/L   Glucose by meter   Result Value Ref Range    Glucose 107 (H) 70 - 99 mg/dL         Internal Medicine Staff Addendum  Date of Service: 9/26/2017  I have seen and examined Mr Moreira, reviewed the data and discussed the plan of care with the care team on rounds.  I agree with  the above documentation with the additions/changes to the ROS, HPI, Exam or data (including my edits in italics):    I discussed pt's care with bedside RN, case management/social work today.  I personally reviewed, labs, medications and past 24 hr notes.  Assessment/Plan/Diagnoses: plan/dx as above, which contains my edits and reflects our joint medical decision-making.     Clarke Manzo MD PhD  Internal Medicine Hospitalist & Staff Physician   of Internal Medicine   [facility]  Pager: [phone]

## 2017-09-26 NOTE — PROGRESS NOTES
PALLIATIVE CONSULT SERVICE  SPIRITUAL ASSESSMENT Progress Note  [facility] ([place]) 4C    Patient was engaged and open to conversation. Outwardly focused conversation on the state of the world, history, politics and nostalgia. Directed conversation about his own agency, for which he feels as if he has non. Patient talked about being subservient to authority Will continue conversation about friendships and forgiveness on Wednesday    Farideh Lopez M.Div., [facility], ACPE Educator  Interim Palliative Consult Service   Pager [phone]

## 2017-09-26 NOTE — PLAN OF CARE
Problem: Infection, Risk/Actual (Adult)  Goal: Identify Related Risk Factors and Signs and Symptoms  Related risk factors and signs and symptoms are identified upon initiation of Human Response Clinical Practice Guideline (CPG).   Outcome: Improving  D/I/A:  Pt continues on oxymask wean down to 4 liters to keep Spo2>88, clear lung sounds, adequate urine out put, but constipated since admission, therefore scheduled Miralax given without any results thus far. Alert and oriented has not offered any complaints, Phosphorus replaced per order, repositioned every two hour via mechanical lift, and bilateral lower extremities dressing changed, as well.  P:  6 B overflow awaiting a bed, continue to monitor, assess and w/POC.

## 2017-09-26 NOTE — PLAN OF CARE
Problem: Patient Care Overview  Goal: Plan of Care/Patient Progress Review  PT 4C: Hold- PT orders received, per chart review and discussion with OT patient is now wanting to pursue comfort cares. Will continue to follow peripherally and hold PT evaluation until further goals of care have been established.

## 2017-09-26 NOTE — PROGRESS NOTES
Cross cover note:    Called by nursing due to patient increased lethargy and CO2 retention despite continuous BiPAP. Patient with recent VBG pH 7.33 pCO2 80.  On interview patient with BiPAP in place but alert, oriented, answers questions appropriately. Patient afebrile and hemodynamically stable. Given patient DNR/DNI will continue with BiPAP at this time.      Eduardo Jose MD PhD  PGY-1, Internal Medicine  [phone]

## 2017-09-26 NOTE — PROGRESS NOTES
NUTRITION SERVICES - BRIEF NOTE    WOC RN note on 9/25 reveals R heel wound is an unstageable PI.    Implementations  -Order Tri-Vi-Sol 13 mL BID x 10 days for wound healing  -Pt already receiving Thera-Vit-M MVI    Monitoring  Nutrition will continue to follow and monitor per POC (see 9/25 RD note)    Karal Andrew RDN, LD  Pgr: [phone]

## 2017-09-26 NOTE — PROGRESS NOTES
[facility], [place]    Palliative Care Progress Note    Patient: Clarke Moreira  Date of Admission:  9/23/2017    Recommendations:  - increase bowel regimen (ordered for you)    Assessment  68y/o male with PMHx pertinent for THONG, OHS, morbid obesity, AFib with RVR, suspected long-standing depression who was admitted 9/23 after falling and being unable to get up. Was found to be hypercarbic on admission, required prolonged BiPAP treatment, now tolerates facemask during the day.    Symptoms: denies any significant symptoms today. Feels comfortable on facemask, has tolerated BiPAP overnight.     Social:  His friend Janice visited for some time today. He is still trying to connect with his other friend.   Once we have a clearer discharge plan, will discuss with MERVAT.     Prognostic Information: he has improved since yesterday, doesn't need BiPAP while awake anymore. He has a very poor functional status at baseline and will most likely need significant support and some BiPAP, possibly only O2.   - Once we have more clarity about his new baseline, we can assess whether he would qualify for hospice should he be interested.     This has been discussed with Roberto, primary team.    Advance Care Planning: additional planning deferred until new baseline is clearer. Will also address anticoagulation depending on goals.      Dee Florez  Pager: [phone]  [facility] Inpatient Team Consult pager [phone] (M-F 8-4:30)  After-hours Answering Service [phone]   Palliative Clinic: [phone]       Interval History:      Clarke is having a good day today, spent much time with his friend Janice, also had a conversation with the palliative , Farideh Lopez, which he enjoyed. He has no questions or complaints at this time.        Medications:   I have reviewed this patient's medication profile and medications during this hospitalization  miralax prn, bisacodyl prn  senna prn -  1 tab         Review of Systems:   Palliative Symptom Review (0=no symptom/no concern, 1=mild, 2=moderate, 3=severe):      Pain: 0      Fatigue: 0      Nausea: 0      Constipation: 2 (no BM charted, doesn't have symptoms)      Diarrhea: 0      Depressive Symptoms: 0      Anxiety: 0      Drowsiness: 0      Poor Appetite: 0      Shortness of Breath: 0      Insomnia: 0      Other: 0      Overall (0 good/no concerns, 3 very poor):  1          Physical Exam:     Vital Signs: Temp: 97.9  F (36.6  C) Temp src: Axillary BP: 126/41   Heart Rate: 109 Resp: 18 SpO2: 99 % O2 Device: Oxi Plus Oxygen Delivery: 5 LPM  Weight: 436 lbs 0 oz    Physical Exam:  CONSTIT: awake, appears comfortable, obese  EENT: MMM, facemask in place, EOMI, no icterus  RESP: reg, nl effort  GI: soft, non-tender  MSK:moves x4  SKIN:  warm, no rash, no obvious lesions  NEURO: alert, oriented x3  PSYCH: appropriate, calm affect, memory and thought process intact        Data Reviewed:    CXR: Pulmonary vascular congestion and bibasilar atelectasis  and/or pneumonia    Dee Florez  Pager: [phone]  [facility] Inpatient Team Consult pager [phone] (M-F 8-4:30)  After-hours Answering Service [phone]  Palliative Clinic: [phone]     Total time spent was 35 minutes,  >50% of time was spent counseling and/or coordination of care regarding symptom assessment.

## 2017-09-27 ENCOUNTER — APPOINTMENT (OUTPATIENT)
Dept: OCCUPATIONAL THERAPY | Facility: CLINIC | Age: 67
DRG: 291 | End: 2017-09-27
Payer: COMMERCIAL

## 2017-09-27 LAB
ANION GAP SERPL CALCULATED.3IONS-SCNC: 6 MMOL/L (ref 3–14)
BASE EXCESS BLDV CALC-SCNC: 12.1 MMOL/L
BUN SERPL-MCNC: 7 MG/DL (ref 7–30)
CALCIUM SERPL-MCNC: 8.4 MG/DL (ref 8.5–10.1)
CHLORIDE SERPL-SCNC: 90 MMOL/L (ref 94–109)
CO2 SERPL-SCNC: 37 MMOL/L (ref 20–32)
CREAT SERPL-MCNC: 0.62 MG/DL (ref 0.66–1.25)
ERYTHROCYTE [DISTWIDTH] IN BLOOD BY AUTOMATED COUNT: 14.3 % (ref 10–15)
GFR SERPL CREATININE-BSD FRML MDRD: >90 ML/MIN/1.7M2
GLUCOSE BLDC GLUCOMTR-MCNC: 100 MG/DL (ref 70–99)
GLUCOSE BLDC GLUCOMTR-MCNC: 103 MG/DL (ref 70–99)
GLUCOSE BLDC GLUCOMTR-MCNC: 110 MG/DL (ref 70–99)
GLUCOSE BLDC GLUCOMTR-MCNC: 116 MG/DL (ref 70–99)
GLUCOSE SERPL-MCNC: 104 MG/DL (ref 70–99)
HCO3 BLDV-SCNC: 43 MMOL/L (ref 21–28)
HCT VFR BLD AUTO: 51.9 % (ref 40–53)
HGB BLD-MCNC: 15.7 G/DL (ref 13.3–17.7)
INR PPP: 1.96 (ref 0.86–1.14)
MCH RBC QN AUTO: 30.5 PG (ref 26.5–33)
MCHC RBC AUTO-ENTMCNC: 30.3 G/DL (ref 31.5–36.5)
MCV RBC AUTO: 101 FL (ref 78–100)
O2/TOTAL GAS SETTING VFR VENT: 30 %
PCO2 BLDV: 86 MM HG (ref 40–50)
PH BLDV: 7.31 PH (ref 7.32–7.43)
PLATELET # BLD AUTO: 149 10E9/L (ref 150–450)
PO2 BLDV: 40 MM HG (ref 25–47)
POTASSIUM SERPL-SCNC: 3.8 MMOL/L (ref 3.4–5.3)
RBC # BLD AUTO: 5.15 10E12/L (ref 4.4–5.9)
SODIUM SERPL-SCNC: 134 MMOL/L (ref 133–144)
WBC # BLD AUTO: 9.7 10E9/L (ref 4–11)

## 2017-09-27 PROCEDURE — 40000133 ZZH STATISTIC OT WARD VISIT: Performed by: OCCUPATIONAL THERAPIST

## 2017-09-27 PROCEDURE — 82803 BLOOD GASES ANY COMBINATION: CPT | Performed by: STUDENT IN AN ORGANIZED HEALTH CARE EDUCATION/TRAINING PROGRAM

## 2017-09-27 PROCEDURE — 25000125 ZZHC RX 250: Performed by: STUDENT IN AN ORGANIZED HEALTH CARE EDUCATION/TRAINING PROGRAM

## 2017-09-27 PROCEDURE — 85027 COMPLETE CBC AUTOMATED: CPT | Performed by: INTERNAL MEDICINE

## 2017-09-27 PROCEDURE — 99233 SBSQ HOSP IP/OBS HIGH 50: CPT | Performed by: HOSPITALIST

## 2017-09-27 PROCEDURE — 85610 PROTHROMBIN TIME: CPT | Performed by: INTERNAL MEDICINE

## 2017-09-27 PROCEDURE — 25000132 ZZH RX MED GY IP 250 OP 250 PS 637: Performed by: INTERNAL MEDICINE

## 2017-09-27 PROCEDURE — 97530 THERAPEUTIC ACTIVITIES: CPT | Mod: GO | Performed by: OCCUPATIONAL THERAPIST

## 2017-09-27 PROCEDURE — 40000275 ZZH STATISTIC RCP TIME EA 10 MIN

## 2017-09-27 PROCEDURE — 25000132 ZZH RX MED GY IP 250 OP 250 PS 637: Performed by: STUDENT IN AN ORGANIZED HEALTH CARE EDUCATION/TRAINING PROGRAM

## 2017-09-27 PROCEDURE — 94640 AIRWAY INHALATION TREATMENT: CPT

## 2017-09-27 PROCEDURE — 99233 SBSQ HOSP IP/OBS HIGH 50: CPT | Mod: GC | Performed by: INTERNAL MEDICINE

## 2017-09-27 PROCEDURE — 82803 BLOOD GASES ANY COMBINATION: CPT | Performed by: INTERNAL MEDICINE

## 2017-09-27 PROCEDURE — 94640 AIRWAY INHALATION TREATMENT: CPT | Mod: 76

## 2017-09-27 PROCEDURE — 25000128 H RX IP 250 OP 636: Performed by: INTERNAL MEDICINE

## 2017-09-27 PROCEDURE — 80048 BASIC METABOLIC PNL TOTAL CA: CPT | Performed by: INTERNAL MEDICINE

## 2017-09-27 PROCEDURE — 40000802 ZZH SITE CHECK

## 2017-09-27 PROCEDURE — 94660 CPAP INITIATION&MGMT: CPT

## 2017-09-27 PROCEDURE — 36592 COLLECT BLOOD FROM PICC: CPT | Performed by: STUDENT IN AN ORGANIZED HEALTH CARE EDUCATION/TRAINING PROGRAM

## 2017-09-27 PROCEDURE — 00000146 ZZHCL STATISTIC GLUCOSE BY METER IP

## 2017-09-27 PROCEDURE — 36592 COLLECT BLOOD FROM PICC: CPT | Performed by: INTERNAL MEDICINE

## 2017-09-27 PROCEDURE — 25000132 ZZH RX MED GY IP 250 OP 250 PS 637: Performed by: HOSPITALIST

## 2017-09-27 PROCEDURE — 12000006 ZZH R&B IMCU INTERMEDIATE UMMC

## 2017-09-27 RX ORDER — CEPHALEXIN 500 MG/1
500 CAPSULE ORAL 4 TIMES DAILY
Status: DISCONTINUED | OUTPATIENT
Start: 2017-09-27 | End: 2017-10-03

## 2017-09-27 RX ORDER — WARFARIN SODIUM 4 MG/1
4 TABLET ORAL
Status: COMPLETED | OUTPATIENT
Start: 2017-09-27 | End: 2017-09-27

## 2017-09-27 RX ADMIN — IPRATROPIUM BROMIDE AND ALBUTEROL SULFATE 3 ML: .5; 3 SOLUTION RESPIRATORY (INHALATION) at 07:45

## 2017-09-27 RX ADMIN — IPRATROPIUM BROMIDE AND ALBUTEROL SULFATE 3 ML: .5; 3 SOLUTION RESPIRATORY (INHALATION) at 12:18

## 2017-09-27 RX ADMIN — Medication 13 ML: at 21:57

## 2017-09-27 RX ADMIN — WARFARIN SODIUM 4 MG: 4 TABLET ORAL at 18:57

## 2017-09-27 RX ADMIN — SENNOSIDES AND DOCUSATE SODIUM 2 TABLET: 8.6; 5 TABLET ORAL at 08:25

## 2017-09-27 RX ADMIN — LEVOTHYROXINE SODIUM 150 MCG: 25 TABLET ORAL at 08:33

## 2017-09-27 RX ADMIN — CEFAZOLIN SODIUM 2 G: 2 INJECTION, SOLUTION INTRAVENOUS at 08:12

## 2017-09-27 RX ADMIN — FUROSEMIDE 40 MG: 10 INJECTION, SOLUTION INTRAVENOUS at 08:17

## 2017-09-27 RX ADMIN — POLYETHYLENE GLYCOL 3350 17 G: 17 POWDER, FOR SOLUTION ORAL at 08:34

## 2017-09-27 RX ADMIN — ASPIRIN 81 MG CHEWABLE TABLET 81 MG: 81 TABLET CHEWABLE at 08:25

## 2017-09-27 RX ADMIN — SODIUM CHLORIDE, PRESERVATIVE FREE 10 ML: 5 INJECTION INTRAVENOUS at 21:11

## 2017-09-27 RX ADMIN — METOPROLOL SUCCINATE 100 MG: 50 TABLET, EXTENDED RELEASE ORAL at 08:33

## 2017-09-27 RX ADMIN — CEFAZOLIN SODIUM 2 G: 2 INJECTION, SOLUTION INTRAVENOUS at 00:26

## 2017-09-27 RX ADMIN — CEPHALEXIN 500 MG: 500 CAPSULE ORAL at 16:13

## 2017-09-27 RX ADMIN — Medication 1 G: at 08:33

## 2017-09-27 RX ADMIN — SODIUM CHLORIDE, PRESERVATIVE FREE 10 ML: 5 INJECTION INTRAVENOUS at 05:27

## 2017-09-27 RX ADMIN — Medication 13 ML: at 08:25

## 2017-09-27 RX ADMIN — MULTIPLE VITAMINS W/ MINERALS TAB 1 TABLET: TAB at 08:33

## 2017-09-27 RX ADMIN — CEPHALEXIN 500 MG: 500 CAPSULE ORAL at 21:11

## 2017-09-27 RX ADMIN — ATORVASTATIN CALCIUM 40 MG: 40 TABLET, FILM COATED ORAL at 08:33

## 2017-09-27 RX ADMIN — LISINOPRIL 10 MG: 10 TABLET ORAL at 08:33

## 2017-09-27 RX ADMIN — SODIUM CHLORIDE, PRESERVATIVE FREE 5 ML: 5 INJECTION INTRAVENOUS at 10:12

## 2017-09-27 RX ADMIN — SENNOSIDES AND DOCUSATE SODIUM 2 TABLET: 8.6; 5 TABLET ORAL at 21:11

## 2017-09-27 ASSESSMENT — PAIN DESCRIPTION - DESCRIPTORS: DESCRIPTORS: TENDER

## 2017-09-27 NOTE — PLAN OF CARE
Problem: Patient Care Overview  Goal: Plan of Care/Patient Progress Review  Discharge Planner OT   Patient plan for discharge: none stated  Current status: pt currently VC min A x2 up to chair from bed with bed elevated and HOB raised. Pt currently below his baseline after fall at home getting up from chair.   Barriers to return to prior living situation: weakness, fall risk, pain  Recommendations for discharge: TCU to improve mobility and home safety   Rationale for recommendations: pt is a high fall risk at this time, would benefit from TCU to improve AE, strength and compensation for ADLs and transfers.       Entered by: Lorena Arizmendi 09/27/2017 3:26 PM

## 2017-09-27 NOTE — PHARMACY-ADMISSION MEDICATION HISTORY
Admission medication history interview status for the 9/23/2017 admission is complete. See Epic admission navigator for allergy information, pharmacy, prior to admission medications and immunization status.     Medication history interview sources:  Patient Interview, [facility] ([phone])    Changes made to PTA medication list (reason)  Added: N/A  Deleted: Ammonium lactate 12% cream; omega 3 fatty acids (duplicate)  Changed: N/A    Additional medication history information (including reliability of information, actions taken by pharmacist): Patient knows most of his medications but does not specifically know the doses of his medications. Patient is enrolled in mail order through [facility] and his PTA medications besides aspirin 81 mg, multivitamin and anne-marie 3 fatty acids were sent on 9/10/17. Doses verified by [facility]. Last doses of his medications was the day prior to admit (9/22/17). He states that he had his flu shot 3 weeks ago but is unsure of the exact day.      Prior to Admission medications    Medication Sig Last Dose Taking? Auth Provider   multivitamin, therapeutic with minerals (MULTI-VITAMIN) TABS tablet Take 1 tablet by mouth daily 9/22/2017 Yes Reported, Patient   aspirin 81 MG tablet Take 1 tablet (81 mg) by mouth daily 9/22/2017 Yes Katharine Mcmullen APRN CNP   atorvastatin (LIPITOR) 40 MG tablet Take 1 tablet (40 mg) by mouth daily 9/22/2017 Yes Katharine Mcmullen APRN CNP   hydrochlorothiazide (HYDRODIURIL) 25 MG tablet Take 1 tablet (25 mg) by mouth daily 9/22/2017 Yes Katharine Mcmullen APRN CNP   levothyroxine (SYNTHROID/LEVOTHROID) 150 MCG tablet Take 1 tablet (150 mcg) by mouth daily 9/22/2017 Yes Katharine Mcmullen APRN CNP   lisinopril (PRINIVIL/ZESTRIL) 10 MG tablet Take 1 tablet (10 mg) by mouth daily 9/22/2017 Yes Katharine Mcmullen APRN CNP   metFORMIN (GLUCOPHAGE) 500 MG tablet Take 1 tablet (500 mg) by mouth 2 times daily (with meals) 9/22/2017 Yes Kemi  REAGAN Deleon CNP   metoprolol (TOPROL-XL) 100 MG 24 hr tablet Take 1 tablet (100 mg) by mouth daily 9/22/2017 Yes Katharine Mcmullen APRN CNP   omega 3 1000 MG CAPS Take 1 g by mouth daily 9/22/2017 Yes Katharine Mcmullen APRN CNP         Medication history completed by: Shamar Mcpherson, PharmD Candidate

## 2017-09-27 NOTE — PLAN OF CARE
Neuro: A&Ox4.   Cardiac: A.Fib with HR 's. VSS.   Respiratory: Sating 88-92 on 30% FiO2 bipap or 3L oxiplus mask. Pt to try Cpap tonight.  GI/: Adequate urine output. Bazzi removed. No BM.  Diet/appetite: Poor appetite, 1.5 FR.  Activity:  Assist of 2, up to chair.  Pain: At acceptable level on current regimen.   Skin: Bilateral lower leg wounds covered.   LDA's: Triple PICC, 2 PIVs    Plan: Continue with POC. Notify primary team with changes.

## 2017-09-27 NOTE — PROGRESS NOTES
"[facility], [place]    Palliative Care Progress Note    Patient: Clarke oMreira  Date of Admission:  9/23/2017    Recommendations:  - ongoing conversation about goals of care    Assessment  66y/o male with PMHx pertinent for THONG, OHS, morbid obesity, AFib with RVR, suspected long-standing depression who was admitted 9/23 after falling and being unable to get up. Was found to be hypercarbic on admission, required prolonged BiPAP treatment, now tolerates O2 by facemask when awake.    Symptoms: Denies increased work of breathing, shortness of breath, sensation of gasping for air. Talks about discomfort of wearing BiPAP including his inability to talk and make his needs known, pressure by the mask and feeling like \"a hurricane is sitting on [his] face\".     Coping: He acknowledges struggling with the transition from ICU to a regular floor. He perceives the rhythm of the floors to be chaotic, worries about not being heard. He is aware of the emotions this experience triggers in him (talks much about shame and fear/concern) and that his immediate instinct is to respond with violence which takes energy to resist. He is grateful for his friend's presence at his bedside.   Prognostic Information: this will largely depend on his choice of treatments. If he accepts continuation of BiPAP treatment, he likely has a life expectancy of months, maybe even years. However, he is strongly considering discontinuing BiPAP. We discussed that in that case we would currently probably look at days, possibly a few weeks. I cautioned him about the uncertainty of prognostic estimates, especially in his situation with significant recent changes in his condition.  I explained that more aggressive treatment with BiPAP would also include a rehab stay and therapies while admitted here.     This has been discussed with Janice Mir    Advance Care Planning: We discussed his condition, current improvement and " treatment choices. I outline that on the one end of the spectrum he can choose to continue with the current approach that aims at treating reversible conditions, would include BiPAP, rehab, likely returns to the hospital in the future, but would allow him to live for a significant amount of time, if things go well, also to return home down the line. On the other end of the spectrum would be a transition to comfort measures only without using the BiPAP. In that case he probably only has days to live, would gradually become more drowsy and obtunded and eventually die, likely in the hospital, possibly in a facility with hospice.      Dee Florez  Pager: [phone]  [facility] Inpatient Team Consult pager [phone] (M-F 8-4:30)  After-hours Answering Service [phone]   Palliative Clinic: [phone]       Interval History:      Transferred to regular floors. No significant overnight events       Medications:   I have reviewed this patient's medication profile and medications during this hospitalization  miralax daily  bisacodyl prn - none  senna 2 bid            Review of Systems:   Palliative Symptom Review (0=no symptom/no concern, 1=mild, 2=moderate, 3=severe):      Pain: 0      Fatigue: 1      Nausea: 0      Constipation: 2      Diarrhea: 0      Depressive Symptoms: 0      Anxiety: 1      Drowsiness: 0      Poor Appetite: 0      Shortness of Breath: 0      Insomnia: 0      Other: 0      Overall (0 good/no concerns, 3 very poor):  1          Physical Exam:     Vital Signs: Temp: 97.3  F (36.3  C) Temp src: Axillary BP: 111/75   Heart Rate: 97 Resp: 17 SpO2: 98 % O2 Device: Oxymask Oxygen Delivery: 7 LPM  Weight: 440 lbs 14.72 oz    Physical Exam:  CONSTIT: awake, appears comfortable, obese  EENT: MMM, facemask in place, EOMI, no icterus  RESP: reg, mildly increased effort  MSK:moves x4  NEURO: alert, oriented x3  PSYCH: pleasant affect, memory and thought process intact    Data Reviewed:    crea 0.6  CBC  jasmin Florez  Pager: [phone]  [facility] Inpatient Team Consult pager [phone] (M-F 8-4:30)  After-hours Answering Service [phone]  Palliative Clinic: [phone]     Total time spent was 50 minutes,  >50% of time was spent counseling and/or coordination of care regarding coping, treatment preferences.

## 2017-09-27 NOTE — PROGRESS NOTES
INTERNAL MEDICINE PROGRESS NOTE    Clarke Moreira ([id number]) admitted on 9/23/2017 09/27/2017    Assessment & Plan:  Clarke Sotelo is a 67 year old male with a history notable for morbid obesity, atrial fibrillation, HFrEF, diabetes, PVD, and untreated depression that was admitted following a fall and CP/SOB on 9/23.  Transferred to MICU on 9/24 for AMS with acute hypoxic/hypercarbic respiratory failure and hypotension requiring initiation of BiPAP and pressors.  Much improved following nasal airway and BiPAP, off pressors and transferred to general medicine for further management.     #Acute on Chronic Hypercapnic Respiratory Failure  #Evidence of Pulmonary Hypertension  #Probable Obesity hypoventilation syndrome  Developed AMS, A fib with RVR to 150-160s and respiratory distress early AM 9/24/17. Sudden decompensated respiratory status could have been secondary to flash pulmonary edema secondary to a-fib with RVR, exacerbation of an obstructive or restrictive lung pathology, infectious causes (although infectious respiratory panel negative) and worsening of obesity hypoventilation syndrome. ABGs demonstrated pH 7.14, CO2 113. Following BiPAP and airway management with nasal trumpet, his ABG improved with pH 7.29, CO2 66. No concerns for hypoxemia. Mental status improved significantly once pCO2 in the 60s range. However, when off BiPAP for prolonged time he re accumulates CO2 and becomes lethargic. Echo with mild reduction of RV function, could be secondary to underlying Pulmonary HTN in the setting of chronic obstructive or restrictive lung disease with OHS, he has  BMI of 62.   - Continue BiPAP as needed during the day time  - Goal O2 to keep saturations > 88%  - CPAP in the evening with VBG check at midnight  - Started DuoNebs QID  - PT ordered as pt sedentary   - Continue with lasix as below  - Incentive spirometry     #Atrial fibrillation with RVR  History of A fib in the past, not anticoagulated at  home but on metoprolol for rate control. CHADS-VaSC 5. Received loading dose of amiodarone and started on amiodarone load and 6hr drip on arrival to MICU when in afib with RVR and hypotension. However, his atrial fibrillation improved once respiratory status was stabilized. Rate is better controlled with metoprolol and has started anticoagulation therapy.  - Metoprolol IV switched to 100mg PO XL  - Will administer small IV doses while transitioning to PO  - On warfarin, will explore if agreeable to start NOAC    #Hypercapnic Encephalopathy - improving  Acute mental status change early AM 9/24 felt to be secondary to acute hypercarbic respiratory failure and acute respiratory acidosis. Mental status has improved once pCO2 in the 60s with BiPAP.  - Monitor mental status  - VBG if mental status change  - CO2 goal of ~65       #History of HFrEF   #Pulmonary Edema  #CHF exacerbation  History of coronary artery disease/peripheral vascular disease per patient on admission. Last Echo 2008 with EF 40-45%, but on 09/24/17 recheck was described as normal. There might be a diastolic component and most suitable be HFpEF. He is on lisinopril, HCTZ, Metoprolol at home. During respiratory decompensation he did have CXR showing pulmonary edema. Which could secondary to HF exacerbation in the setting of atrial fibrillation. RV mildly reduced, IVC 3.34 without respiratory variability.  CXR from 09/26/17 with improving vascular congestion.   - Holding HCTZ in the setting of loop diuretic administration  - continue daily diuresis with 40 mg IV lasix goal of negative 1L  - Fluid restriction ~1.0L per day      #Peripheral Vascular Disease  #Venous Stasis   Patient reported worsening numbness/loss of sensation in his feet bilaterally over the last week, acutely worsening since admission. No signs of limb ischemia, but US did reveal anterior tibial artery and dorsalis pedis occlusion. Sensation might be an issue of diabetic neuropathy as  "well.  - Continue with right leg elevation  - VICK with toe pressures as outpatient, lymphedema wraps  - Monitor for signs of acute leg ischemia    #Depression  History of Depression and PTSD with limited social support. Declined therapy and in hospital chaplaincy services. No antidepressants at home. Ongoing concern for recent DNR/DNI code status change during this hospitalization with expressions of passive suicidality - wanting to avoid BiPap even though he understands it is a life sustaining treatment. Reports - \"the life that I live is not a life worth living.\" Psychiatry evaluated patient and did not believe depression was factoring on DNI/DNR and possible comfort care decision. Palliative care is involved and at this moment patient is agreeable to continue management and optimization of respiratory status as it is improving, but is still considering discontinuing BiPAP.   - Continue address goals of care with patient  - Palliative care following    #Lower Extremity Cellulitis   Bilateral lower extremity with stasis dermatitis and ulcers. Area not tender, but erythematous and warm. Started on cephalosporin on 09/23/17.   - WOC and lymphedema following  - Transition to cephalexin on 09/27/17 if continues with improving respiratory status and not requiring continuous BiPAP  - Complete 7 day course on 09/29/17 and clinically reassess    #Perisplenic Free Fluid  Noted on CT Chest in the ED 9/24, splenic laceration and perisplenic fluid collection following fall. Hgb has remained stable, no increase in abdominal distension, no abdominal pain. Per surgery, as hemoglobin stable for 24 hours post-fall, can restart anticoagulation.  - Monitor, daily abdominal exams, no tenderness or signs of acute abdomen  - Daily CBC  - Low threshold to reimage if concern for splenic hemorrhage   - General surgery consulted, appreciate recs as above  - Started patient on warfarin    #Dyspnea  #Atypical Chest Pain, improving  Developed " left sided chest pain and shortness of breath after fall from chair. History of CAD/PVD per patient, not documented in EMR. Troponins x3 negative, D-dimer elevated but CT PE study negative for embolism. Pain not improved with nitroglycerin. CK negative, rhabdo unlikely (normal renal function, potassium). Attributable to musculoskeletal at this point with dyspnea likely from HF exacerbation in setting of dilated IVC without variability with respirations.  - Cardio respiratory monitoring  - Continue PTA lipitor   - Continue ASA 81mg q day  - daily diuresis 40 mg lasix.  - Metoprolol as above       #Hypertension   On lisinopril at home given history of hypertension and DMII. Creatinine normal on admission. Now with improved hypotension.  - Continue lisinopril 10 mg q day as not on pressors    # Lower Extremity Cellulitis   History of CAD per patient, noted to have venous stasis changes with erythema and warmth bilaterally on admission concerning for cellulitis, started on Cephalexin for treatment of soft tissue infection. Hypotension unlikely related to blood stream infection related to this given acute resolution, normal lactate, absence of other infectious symptoms (fever).   - WOC and lymphedema following  - Started Cephalexin  - Low threshold for blood cultures/lactate (normal lactic on admission)      #Polycythemia   Hgb 17.2 on admission, peaked at 18.6. Could be secondary to hemoconcentration. Now normalized.       #DMII  On Metformin at home. HgbA1c 6.5 on 8/14/17.  - Low dose SSI      #Hypothyroidism  TSH on admission 3.11, on Synthroid 150 mcg at home  - Continue synthroid 150 mcg       #Hyponatremia, resolved.  - Will continue to monitor sodium as rate of correction appropriate in the past 48 hours.  - Will continue with diuresis  - Daily CMP  - Continue to follow, expect to improve with diuresis     #Hypophosphatemia  Could be secondary to increase respiratory effort.   - Replace PO4  - Monitor  "improvement      DVT PPX: on Warfarin   GI PPX: none  CODE: DNR/DNI -   Family: POC is Janice Hillman [phone].      FEN: CHO consistent  Prophylaxis: DVT warfarin  Disposition: Will need optimization of respiratory status and PT for decompensation  Code Status: DNR/DNI    Roberto Alexander MD PhD  Internal Medicine PGY2  [phone]    Patient was seen and discussed with  who agrees with above assessment and plan.    ==================================================================    Interval HistorySubjective:  No overnight events, lethargic in the morning but improved to BiPAP. Denies fever, chills, night sweats, headache, shortness of breath, chest pain, nausea, vomiting, diarrhea, constipation.     Objective:  Most recent vital signs:  /75 (BP Location: Left arm)  Temp 97.3  F (36.3  C) (Axillary)  Resp 17  Ht 1.803 m (5' 11\")  Wt (!) 200 kg (440 lb 14.7 oz)  SpO2 98%  BMI 61.5 kg/m2  Temp:  [97.3  F (36.3  C)-98.1  F (36.7  C)] 97.3  F (36.3  C)  Heart Rate:  [] 97  Resp:  [14-22] 17  BP: ()/(49-82) 111/75  FiO2 (%):  [25 %-60 %] 30 %  SpO2:  [89 %-100 %] 98 %  Wt Readings from Last 2 Encounters:   09/27/17 (!) 200 kg (440 lb 14.7 oz)   08/14/17 (!) 191 kg (421 lb)       Intake/Output Summary (Last 24 hours) at 09/26/17 1426  Last data filed at 09/26/17 1400   Gross per 24 hour   Intake             1326 ml   Output             1580 ml   Net             -254 ml       Physical exam:  General: Patient lying comfortably in bed, NAD, wearing BiPAP mask  HEENT: EOMI, PERRL, no scleral icterus or injection, no bruits appreciated, difficult to appreciate JVD due to body habitus, MMM  Cardiac: RRR, no m/r/g appreciated.   Respiratory: reduced lung sounds bilaterally across all fields, no wheezing or crackles  GI: Obese, NT, no organomegaly, no signs of acute abdomen  Extremities: +2 edema bilaterally, absent pulses in right dorsalis pedis and anterior tibialis, signs of digital ischemia vs " chronic fungi infection   Skin: bilateral leg wound ulcers, bandaged  Neuro: AOx3,  Moving all extremities sponteneously    Labs:  Results for orders placed or performed during the hospital encounter of 09/23/17 (from the past 24 hour(s))   Glucose by meter   Result Value Ref Range    Glucose 116 (H) 70 - 99 mg/dL   Phosphorus   Result Value Ref Range    Phosphorus 3.1 2.5 - 4.5 mg/dL   Glucose by meter   Result Value Ref Range    Glucose 106 (H) 70 - 99 mg/dL   Basic metabolic panel   Result Value Ref Range    Sodium 134 133 - 144 mmol/L    Potassium 3.8 3.4 - 5.3 mmol/L    Chloride 90 (L) 94 - 109 mmol/L    Carbon Dioxide 37 (H) 20 - 32 mmol/L    Anion Gap 6 3 - 14 mmol/L    Glucose 104 (H) 70 - 99 mg/dL    Urea Nitrogen 7 7 - 30 mg/dL    Creatinine 0.62 (L) 0.66 - 1.25 mg/dL    GFR Estimate >90 >60 mL/min/1.7m2    GFR Estimate If Black >90 >60 mL/min/1.7m2    Calcium 8.4 (L) 8.5 - 10.1 mg/dL   CBC with platelets   Result Value Ref Range    WBC 9.7 4.0 - 11.0 10e9/L    RBC Count 5.15 4.4 - 5.9 10e12/L    Hemoglobin 15.7 13.3 - 17.7 g/dL    Hematocrit 51.9 40.0 - 53.0 %     (H) 78 - 100 fl    MCH 30.5 26.5 - 33.0 pg    MCHC 30.3 (L) 31.5 - 36.5 g/dL    RDW 14.3 10.0 - 15.0 %    Platelet Count 149 (L) 150 - 450 10e9/L   INR   Result Value Ref Range    INR 1.96 (H) 0.86 - 1.14   Glucose by meter   Result Value Ref Range    Glucose 100 (H) 70 - 99 mg/dL   Blood gas venous   Result Value Ref Range    Ph Venous 7.31 (L) 7.32 - 7.43 pH    PCO2 Venous 86 (HH) 40 - 50 mm Hg    PO2 Venous 40 25 - 47 mm Hg    Bicarbonate Venous 43 (H) 21 - 28 mmol/L    Base Excess Venous 12.1 mmol/L    FIO2 30    Glucose by meter   Result Value Ref Range    Glucose 110 (H) 70 - 99 mg/dL         Internal Medicine Staff Addendum  Date of Service: 9/27/2017  I have seen and examined Mr Moreira, reviewed the data and discussed the plan of care with the care team on rounds.  I agree with the above documentation with the additions/changes  to the ROS, HPI, Exam or data (including my edits in italics):    I discussed pt's care with bedside RN, case management/social work today.  I personally reviewed, labs, medications and past 24 hr notes.  Assessment/Plan/Diagnoses: plan/dx as above, which contains my edits and reflects our joint medical decision-making.     Clarke Manzo MD PhD  Internal Medicine Hospitalist & Staff Physician   of Internal Medicine   [facility]  Pager: [phone]

## 2017-09-27 NOTE — PROGRESS NOTES
"/59 (BP Location: Left arm)  Temp 97.6  F (36.4  C) (Axillary)  Resp 18  Ht 1.803 m (5' 11\")  Wt (!) 197.8 kg (436 lb)  SpO2 95%  BMI 60.81 kg/m2\    Admission          9/23/2017 10:25 AM  -----------------------------------------------------------  Primary team notified of pt arrival.  Transferred  From:   Via: bed  Accompanied by: family  Belongings: at bedside  Admission Profile: complete  Teaching: orientation to unit and call light- call light within reach, call don't fall, use of console, meal times, when to call for the RN, and enforced importance of safety   Access: PIV  Telemetry:Placed on pt  2 RN Skin Assessment Completed By: Sugey CARUSO RN, Lindsey RN (float pool)      "

## 2017-09-27 NOTE — PROVIDER NOTIFICATION
-------------------CRITICAL LAB VALUE-------------------    Lab Value: CO2- 86 (pH- 7.31, PO2- 40, Bicarb- 43)  Time of notification: 10:53 AM  MD notified: Dr. Mejia  Patient status: Stable  Temp:  [97.5  F (36.4  C)-98.1  F (36.7  C)] 97.5  F (36.4  C)  Heart Rate:  [] 91  Resp:  [14-22] 22  BP: ()/(41-82) 92/65  FiO2 (%):  [25 %-60 %] 30 %  SpO2:  [89 %-100 %] 94 %  Orders received: To keep patient on Bipap with Oxygen target levels at 88-90%. Will continue to monitor closely.

## 2017-09-27 NOTE — PLAN OF CARE
"Problem: Patient Care Overview  Goal: Plan of Care/Patient Progress Review  BP 99/58  Temp 98.1  F (36.7  C) (Oral)  Resp 18  Ht 1.803 m (5' 11\")  Wt (!) 197.8 kg (436 lb)  SpO2 97%  BMI 60.81 kg/m2     Neuro: A&Ox4. Making needs known.  Cardiac: A-fib RVR with rates 110-120s. VSS, afebrile.  Respiratory: Sating mid 90s on 3L oxy-plus mask or 60% bipap. Tolerated bipap for 3 hours.  GI/: Adequate urine output via escalona. No BM, passing gas.  Diet/appetite: Tolerating mod CHO diet. Eating well. 1500mL FR.  Activity:  Assist of 3, all repositions done with lift.  Pain: No complaints of pain.  Skin: Dressings dry and intact on legs bilaterally, left foot dressing changed this AM.     Plan: Continue with POC. Notify primary team with changes.          "

## 2017-09-27 NOTE — PLAN OF CARE
D: Fall, new onset Afib/RVR, hypoventilation/hypercapnia.  A/I: Assumed car of pt from [id number]. A&O x 4, very pleasant and cooperative. Denies pain. HR  Afib with occasional PVCs. BPs soft, but hemodynamically stable, MAP remains > 60 mmHg (goal). LS clear/dim. SpO2 > 88% on 4L OxyMask, orders for BiPAP qHS. Bazzi patent with adequate UOP, avg 100 mL/hr. Low Na/low fat/low cholesterol diet with 1.5L fluid restriction, very poor appetite. Active bowel sounds. Constipated, last BM 9/20 (per patient), nightly dose of senna given. ACHS glucose checks. Very poor mobility, requires 2-3 assist with mechanical lift. Pt states he is very sedentary at home. Pain with movement of BLEs, moderate venous stasis wounds and foot ulcers- WOC following- drsgs CDI. Receiving 40 mg furosemide daily for diuresis.   P: Report given to Sugey on 6B, pt will transfer to lower level of care this evening. Continue to monitor HR, hemodynamics, wounds.

## 2017-09-28 ENCOUNTER — APPOINTMENT (OUTPATIENT)
Dept: OCCUPATIONAL THERAPY | Facility: CLINIC | Age: 67
DRG: 291 | End: 2017-09-28
Payer: COMMERCIAL

## 2017-09-28 ENCOUNTER — APPOINTMENT (OUTPATIENT)
Dept: PHYSICAL THERAPY | Facility: CLINIC | Age: 67
DRG: 291 | End: 2017-09-28
Payer: COMMERCIAL

## 2017-09-28 PROBLEM — R06.02 SOB (SHORTNESS OF BREATH): Status: ACTIVE | Noted: 2017-09-28

## 2017-09-28 LAB
ANION GAP SERPL CALCULATED.3IONS-SCNC: 7 MMOL/L (ref 3–14)
BASE EXCESS BLDV CALC-SCNC: 16.1 MMOL/L
BUN SERPL-MCNC: 7 MG/DL (ref 7–30)
CALCIUM SERPL-MCNC: 8.5 MG/DL (ref 8.5–10.1)
CHLORIDE SERPL-SCNC: 89 MMOL/L (ref 94–109)
CO2 SERPL-SCNC: 37 MMOL/L (ref 20–32)
CREAT SERPL-MCNC: 0.56 MG/DL (ref 0.66–1.25)
ERYTHROCYTE [DISTWIDTH] IN BLOOD BY AUTOMATED COUNT: 14.1 % (ref 10–15)
GFR SERPL CREATININE-BSD FRML MDRD: >90 ML/MIN/1.7M2
GLUCOSE BLDC GLUCOMTR-MCNC: 116 MG/DL (ref 70–99)
GLUCOSE BLDC GLUCOMTR-MCNC: 139 MG/DL (ref 70–99)
GLUCOSE BLDC GLUCOMTR-MCNC: 144 MG/DL (ref 70–99)
GLUCOSE SERPL-MCNC: 97 MG/DL (ref 70–99)
HCO3 BLDV-SCNC: 43 MMOL/L (ref 21–28)
HCT VFR BLD AUTO: 49.1 % (ref 40–53)
HGB BLD-MCNC: 15.5 G/DL (ref 13.3–17.7)
INR PPP: 1.94 (ref 0.86–1.14)
MCH RBC QN AUTO: 30.5 PG (ref 26.5–33)
MCHC RBC AUTO-ENTMCNC: 31.6 G/DL (ref 31.5–36.5)
MCV RBC AUTO: 97 FL (ref 78–100)
O2/TOTAL GAS SETTING VFR VENT: 30 %
PCO2 BLDV: 55 MM HG (ref 40–50)
PH BLDV: 7.5 PH (ref 7.32–7.43)
PLATELET # BLD AUTO: 158 10E9/L (ref 150–450)
PO2 BLDV: 34 MM HG (ref 25–47)
POTASSIUM SERPL-SCNC: 3.6 MMOL/L (ref 3.4–5.3)
RBC # BLD AUTO: 5.08 10E12/L (ref 4.4–5.9)
SODIUM SERPL-SCNC: 133 MMOL/L (ref 133–144)
WBC # BLD AUTO: 9.1 10E9/L (ref 4–11)

## 2017-09-28 PROCEDURE — 85610 PROTHROMBIN TIME: CPT | Performed by: INTERNAL MEDICINE

## 2017-09-28 PROCEDURE — 25000128 H RX IP 250 OP 636: Performed by: INTERNAL MEDICINE

## 2017-09-28 PROCEDURE — 99233 SBSQ HOSP IP/OBS HIGH 50: CPT | Performed by: HOSPITALIST

## 2017-09-28 PROCEDURE — 93005 ELECTROCARDIOGRAM TRACING: CPT

## 2017-09-28 PROCEDURE — 93010 ELECTROCARDIOGRAM REPORT: CPT | Performed by: INTERNAL MEDICINE

## 2017-09-28 PROCEDURE — 40000133 ZZH STATISTIC OT WARD VISIT

## 2017-09-28 PROCEDURE — 25000132 ZZH RX MED GY IP 250 OP 250 PS 637: Performed by: STUDENT IN AN ORGANIZED HEALTH CARE EDUCATION/TRAINING PROGRAM

## 2017-09-28 PROCEDURE — 40000275 ZZH STATISTIC RCP TIME EA 10 MIN

## 2017-09-28 PROCEDURE — 25000132 ZZH RX MED GY IP 250 OP 250 PS 637: Performed by: INTERNAL MEDICINE

## 2017-09-28 PROCEDURE — 80048 BASIC METABOLIC PNL TOTAL CA: CPT | Performed by: INTERNAL MEDICINE

## 2017-09-28 PROCEDURE — 94640 AIRWAY INHALATION TREATMENT: CPT

## 2017-09-28 PROCEDURE — 97535 SELF CARE MNGMENT TRAINING: CPT | Mod: GO

## 2017-09-28 PROCEDURE — 25000125 ZZHC RX 250: Performed by: STUDENT IN AN ORGANIZED HEALTH CARE EDUCATION/TRAINING PROGRAM

## 2017-09-28 PROCEDURE — 25000125 ZZHC RX 250

## 2017-09-28 PROCEDURE — 00000146 ZZHCL STATISTIC GLUCOSE BY METER IP

## 2017-09-28 PROCEDURE — 97140 MANUAL THERAPY 1/> REGIONS: CPT | Mod: GO | Performed by: OCCUPATIONAL THERAPIST

## 2017-09-28 PROCEDURE — 40000193 ZZH STATISTIC PT WARD VISIT: Performed by: PHYSICAL THERAPIST

## 2017-09-28 PROCEDURE — 12000006 ZZH R&B IMCU INTERMEDIATE UMMC

## 2017-09-28 PROCEDURE — 40000133 ZZH STATISTIC OT WARD VISIT: Performed by: OCCUPATIONAL THERAPIST

## 2017-09-28 PROCEDURE — 99233 SBSQ HOSP IP/OBS HIGH 50: CPT | Mod: GC | Performed by: INTERNAL MEDICINE

## 2017-09-28 PROCEDURE — 85027 COMPLETE CBC AUTOMATED: CPT | Performed by: INTERNAL MEDICINE

## 2017-09-28 PROCEDURE — 36592 COLLECT BLOOD FROM PICC: CPT | Performed by: INTERNAL MEDICINE

## 2017-09-28 PROCEDURE — 40000802 ZZH SITE CHECK

## 2017-09-28 PROCEDURE — 97530 THERAPEUTIC ACTIVITIES: CPT | Mod: GP | Performed by: PHYSICAL THERAPIST

## 2017-09-28 PROCEDURE — 97530 THERAPEUTIC ACTIVITIES: CPT | Mod: GO

## 2017-09-28 PROCEDURE — 94640 AIRWAY INHALATION TREATMENT: CPT | Mod: 76

## 2017-09-28 PROCEDURE — 97162 PT EVAL MOD COMPLEX 30 MIN: CPT | Mod: GP | Performed by: PHYSICAL THERAPIST

## 2017-09-28 PROCEDURE — 25000132 ZZH RX MED GY IP 250 OP 250 PS 637: Performed by: HOSPITALIST

## 2017-09-28 PROCEDURE — 94660 CPAP INITIATION&MGMT: CPT

## 2017-09-28 PROCEDURE — 97116 GAIT TRAINING THERAPY: CPT | Mod: GP | Performed by: PHYSICAL THERAPIST

## 2017-09-28 RX ORDER — WARFARIN SODIUM 6 MG/1
6 TABLET ORAL
Status: COMPLETED | OUTPATIENT
Start: 2017-09-28 | End: 2017-09-28

## 2017-09-28 RX ORDER — TAMSULOSIN HYDROCHLORIDE 0.4 MG/1
0.4 CAPSULE ORAL DAILY
Status: DISCONTINUED | OUTPATIENT
Start: 2017-09-28 | End: 2017-10-08 | Stop reason: HOSPADM

## 2017-09-28 RX ORDER — METOPROLOL TARTRATE 1 MG/ML
INJECTION, SOLUTION INTRAVENOUS
Status: COMPLETED
Start: 2017-09-28 | End: 2017-09-28

## 2017-09-28 RX ORDER — MAGNESIUM CARB/ALUMINUM HYDROX 105-160MG
296 TABLET,CHEWABLE ORAL ONCE
Status: COMPLETED | OUTPATIENT
Start: 2017-09-28 | End: 2017-09-29

## 2017-09-28 RX ORDER — NALOXONE HYDROCHLORIDE 0.4 MG/ML
.1-.4 INJECTION, SOLUTION INTRAMUSCULAR; INTRAVENOUS; SUBCUTANEOUS
Status: DISCONTINUED | OUTPATIENT
Start: 2017-09-28 | End: 2017-10-08 | Stop reason: HOSPADM

## 2017-09-28 RX ORDER — METOPROLOL TARTRATE 1 MG/ML
5 INJECTION, SOLUTION INTRAVENOUS ONCE
Status: COMPLETED | OUTPATIENT
Start: 2017-09-28 | End: 2017-09-28

## 2017-09-28 RX ADMIN — METOPROLOL TARTRATE 5 MG: 5 INJECTION INTRAVENOUS at 17:36

## 2017-09-28 RX ADMIN — SENNOSIDES AND DOCUSATE SODIUM 2 TABLET: 8.6; 5 TABLET ORAL at 20:21

## 2017-09-28 RX ADMIN — MULTIPLE VITAMINS W/ MINERALS TAB 1 TABLET: TAB at 09:27

## 2017-09-28 RX ADMIN — SODIUM CHLORIDE, PRESERVATIVE FREE 10 ML: 5 INJECTION INTRAVENOUS at 20:21

## 2017-09-28 RX ADMIN — METOPROLOL TARTRATE 5 MG: 1 INJECTION, SOLUTION INTRAVENOUS at 17:36

## 2017-09-28 RX ADMIN — CEPHALEXIN 500 MG: 500 CAPSULE ORAL at 17:49

## 2017-09-28 RX ADMIN — POLYETHYLENE GLYCOL 3350 17 G: 17 POWDER, FOR SOLUTION ORAL at 14:07

## 2017-09-28 RX ADMIN — IPRATROPIUM BROMIDE AND ALBUTEROL SULFATE 3 ML: .5; 3 SOLUTION RESPIRATORY (INHALATION) at 20:34

## 2017-09-28 RX ADMIN — ATORVASTATIN CALCIUM 40 MG: 40 TABLET, FILM COATED ORAL at 09:25

## 2017-09-28 RX ADMIN — ASPIRIN 81 MG CHEWABLE TABLET 81 MG: 81 TABLET CHEWABLE at 09:25

## 2017-09-28 RX ADMIN — IPRATROPIUM BROMIDE AND ALBUTEROL SULFATE 3 ML: .5; 3 SOLUTION RESPIRATORY (INHALATION) at 11:49

## 2017-09-28 RX ADMIN — SENNOSIDES AND DOCUSATE SODIUM 2 TABLET: 8.6; 5 TABLET ORAL at 14:07

## 2017-09-28 RX ADMIN — CEPHALEXIN 500 MG: 500 CAPSULE ORAL at 09:27

## 2017-09-28 RX ADMIN — CEPHALEXIN 500 MG: 500 CAPSULE ORAL at 22:51

## 2017-09-28 RX ADMIN — SODIUM CHLORIDE, PRESERVATIVE FREE 5 ML: 5 INJECTION INTRAVENOUS at 10:20

## 2017-09-28 RX ADMIN — Medication 13 ML: at 17:49

## 2017-09-28 RX ADMIN — TAMSULOSIN HYDROCHLORIDE 0.4 MG: 0.4 CAPSULE ORAL at 14:06

## 2017-09-28 RX ADMIN — IPRATROPIUM BROMIDE AND ALBUTEROL SULFATE 3 ML: .5; 3 SOLUTION RESPIRATORY (INHALATION) at 15:53

## 2017-09-28 RX ADMIN — FUROSEMIDE 40 MG: 10 INJECTION, SOLUTION INTRAVENOUS at 09:20

## 2017-09-28 RX ADMIN — Medication 1 G: at 09:27

## 2017-09-28 RX ADMIN — CEPHALEXIN 500 MG: 500 CAPSULE ORAL at 14:06

## 2017-09-28 RX ADMIN — WARFARIN SODIUM 6 MG: 6 TABLET ORAL at 17:49

## 2017-09-28 RX ADMIN — LISINOPRIL 10 MG: 10 TABLET ORAL at 09:25

## 2017-09-28 RX ADMIN — METOPROLOL SUCCINATE 100 MG: 50 TABLET, EXTENDED RELEASE ORAL at 09:23

## 2017-09-28 RX ADMIN — LEVOTHYROXINE SODIUM 150 MCG: 25 TABLET ORAL at 09:25

## 2017-09-28 RX ADMIN — IPRATROPIUM BROMIDE AND ALBUTEROL SULFATE 3 ML: .5; 3 SOLUTION RESPIRATORY (INHALATION) at 08:08

## 2017-09-28 ASSESSMENT — PAIN DESCRIPTION - DESCRIPTORS: DESCRIPTORS: ACHING

## 2017-09-28 NOTE — PROGRESS NOTES
CLINICAL NUTRITION SERVICES  Reason for Assessment:  Nutrition education regarding higher protein and healthy diet education  Diet History:  Patient has no history ofhigher protein and healthy diet education.  Nutrition Diagnosis:  Food- and nutrition-related knowledge deficit r/t no previous knowledge of higher protein and healthy diet as evidenced by patient report  Interventions:  Provided instruction on higher protein and healthy diet. Discussed each food group and foods to eat. Also discussed My Plate portions method for preparing meals. Answered patient's questions regarding diet. Pt very appreciative of education.   Provided handouts : Tips to Increase Protein in the Diet and Sources of Protein  Goals:   Patient will verbalize at least 5 high protein foods.  Follow-up:    Patient to ask any further nutrition-related questions before discharge.  In addition, pt may request outpatient RD appointment.    Pamela Euceda RD, LD  Unit 6B pgr: [phone]

## 2017-09-28 NOTE — PLAN OF CARE
Problem: Patient Care Overview  Goal: Plan of Care/Patient Progress Review  Discharge Planner OT   Patient plan for discharge: none stated  Current status: Pt completed simple grooming tasks while seated after set up.  Pt Mod-Max A of 2 for sit to stand and bed mobility.  Barriers to return to prior living situation: Pt with severely decreased I with ADLs, functional mobility and transfers.  Recommendations for discharge: TCU  Rationale for recommendations: Pt would benefit from continued therapy to increase I with ADLs, functional mobility and transfers.       Entered by: Charlotte Reese 09/28/2017 4:26 PM

## 2017-09-28 NOTE — PLAN OF CARE
Problem: Patient Care Overview  Goal: Plan of Care/Patient Progress Review  PT 6B: Evaluation completed and treatment initiated. Pt completes supine>sitting EOB with Mod-MaxA of 1 and HOB partially elevated. Pt fatigues very quickly with mobility - frequent rest breaks needed throughout each activity / transfer. Sit<>stands from EOB with Sravanthi of 2 and wide fww. Ambulates short distances of 8 ft x1 and 5 ft x1 in room with wide fww and CGA of 2. Very fatigued upon completion. SpO2 94% on 1L of O2 via NC. HR elevated to 147 bpm with activity, recovers to 126 bpm once seated.       Recommend discharge to TCU to progress strengthening and IND with functional mobility prior to return home.

## 2017-09-28 NOTE — PROVIDER NOTIFICATION
Maria Conti MD notified pt's HRs 150s-160s. Pt not symptomatic. EKG ordered by charge nurse. Metoprolol 5mg IV ordered by MD.  Addendum: EKG shows afib RVR. Metoprolol administered, HR mostly 130s now.

## 2017-09-28 NOTE — PROGRESS NOTES
INTERNAL MEDICINE PROGRESS NOTE    Clarke Moreira ([id number]) admitted on 9/23/2017 09/28/2017    Assessment & Plan:  Clarke Sotelo is a 67 year old male with a history notable for morbid obesity, atrial fibrillation, HFrEF, diabetes, PVD, and untreated depression that was admitted following a fall and CP/SOB on 9/23.  Transferred to MICU on 9/24 for AMS with acute hypoxic/hypercarbic respiratory failure and hypotension requiring initiation of BiPAP and pressors.  Much improved following nasal airway and BiPAP, off pressors and transferred to general medicine for further management.     #Acute on Chronic Hypercapnic Respiratory Failure  #Evidence of Pulmonary Hypertension  #Probable Obesity hypoventilation syndrome  Developed AMS, A fib with RVR to 150-160s and respiratory distress early AM 9/24/17. Sudden decompensated respiratory status could have been secondary to flash pulmonary edema secondary to a-fib with RVR, exacerbation of an obstructive or restrictive lung pathology, infectious causes (although infectious respiratory panel negative) and worsening of obesity hypoventilation syndrome. ABGs demonstrated pH 7.14, CO2 113. Following BiPAP and airway management with nasal trumpet, his ABG improved with pH 7.29, CO2 66. No concerns for hypoxemia. Mental status improved significantly once pCO2 in the 60s range. However, when off BiPAP for prolonged time he re accumulates CO2 and becomes lethargic. Echo with mild reduction of RV function, could be secondary to underlying Pulmonary HTN in the setting of chronic obstructive or restrictive lung disease with OHS, he has  BMI of 62. Responded well last night to CPAP and VBG appear stable.    - Continue CPAP at night  - Goal O2 to keep saturations > 88%  - CPAP in the evening with VBG check at midnight  - Started DuoNebs QID  - PT ordered as pt sedentary   - Continue with lasix as below  - Incentive spirometry   - CXR on 09/29/17    #Atrial fibrillation with  RVR  History of A fib in the past, not anticoagulated at home but on metoprolol for rate control. CHADS-VaSC 5. Received loading dose of amiodarone and started on amiodarone load and 6hr drip on arrival to MICU when in afib with RVR and hypotension. However, his atrial fibrillation improved once respiratory status was stabilized. Rate is better controlled with metoprolol and has started anticoagulation therapy.  - Continue metoprolol 100mg PO XL  - Will administer small IV doses while transitioning to PO  - On warfarin    #Hypercapnic Encephalopathy - improving  Acute mental status change early AM 9/24 felt to be secondary to acute hypercarbic respiratory failure and acute respiratory acidosis. Mental status has improved once pCO2 in the 60s with BiPAP and now on night time CPAP.  - Monitor mental status  - VBG if mental status change  - CO2 goal of ~60s       #History of HFrEF   #Pulmonary Edema  #CHF exacerbation  History of coronary artery disease/peripheral vascular disease per patient on admission. Last Echo 2008 with EF 40-45%, but on 09/24/17 recheck was described as normal. There might be a diastolic component and most suitable be HFpEF. He is on lisinopril, HCTZ, Metoprolol at home. During respiratory decompensation he did have CXR showing pulmonary edema. Which could secondary to HF exacerbation in the setting of atrial fibrillation. RV mildly reduced, IVC 3.34 without respiratory variability.  CXR from 09/26/17 with improving vascular congestion.   - Holding HCTZ while on furosemide  - continue daily diuresis with 40 mg IV lasix goal of negative 1L  - Fluid restriction ~1.0L per day      #Peripheral Vascular Disease  #Venous Stasis   Patient reported worsening numbness/loss of sensation in his feet bilaterally over the last week, acutely worsening since admission. No signs of limb ischemia, but US did reveal anterior tibial artery and dorsalis pedis occlusion. Sensation might be an issue of diabetic  "neuropathy as well.  - Continue with right leg elevation  - VICK with toe pressures as outpatient, lymphedema wraps  - Monitor for signs of acute leg ischemia    #Depression  History of Depression and PTSD with limited social support. Declined therapy and in hospital chaplaincy services. No antidepressants at home. Ongoing concern for recent DNR/DNI code status change during this hospitalization with expressions of passive suicidality - wanting to avoid BiPap even though he understands it is a life sustaining treatment. Reports - \"the life that I live is not a life worth living.\" Psychiatry evaluated patient and did not believe depression was factoring on DNI/DNR and possible comfort care decision. Palliative care is involved and at this moment patient is agreeable to continue management and optimization of respiratory status as it is improving, but is still considering discontinuing BiPAP.   - Continue address goals of care with patient  - Palliative care following    #Lower Extremity Cellulitis - improving  Bilateral lower extremity with stasis dermatitis and ulcers. Area with some tenderness. Erythema and warmth has improve.  Started on cephalosporin on 09/23/17.   - WOC and lymphedema following  - Transition to cephalexin on 09/27/17 if continues with improving respiratory status and not requiring continuous BiPAP  - Complete 10 day course on 10/02/17    #Perisplenic Free Fluid  Noted on CT Chest in the ED 9/24, splenic laceration and perisplenic fluid collection following fall. Hgb has remained stable, no increase in abdominal distension, no abdominal pain. Per surgery, as hemoglobin stable for 24 hours post-fall, can restart anticoagulation.  - Monitor, daily abdominal exams, no tenderness or signs of acute abdomen  - Daily CBC  - Low threshold to reimage if concern for splenic hemorrhage   - General surgery consulted, appreciate recs as above  - Started patient on warfarin    #Dyspnea  #Atypical Chest Pain, " improving  Developed left sided chest pain and shortness of breath after fall from chair. History of CAD/PVD per patient, not documented in EMR. Troponins x3 negative, D-dimer elevated but CT PE study negative for embolism. Pain not improved with nitroglycerin. CK negative, rhabdo unlikely (normal renal function, potassium). Attributable to musculoskeletal at this point with dyspnea likely from HF exacerbation in setting of dilated IVC without variability with respirations.  - Cardio respiratory monitoring  - Continue PTA lipitor   - Continue ASA 81mg q day  - daily diuresis 40 mg lasix.  - Metoprolol as above       #Hypertension   On lisinopril at home given history of hypertension and DMII. Creatinine normal on admission. Now with improved hypotension.  - Continue lisinopril 10 mg q day as not on pressors      #Polycythemia   Hgb 17.2 on admission, peaked at 18.6. Could be secondary to hemoconcentration. Now normalized.       #DMII  On Metformin at home. HgbA1c 6.5 on 8/14/17.  - Low dose SSI      #Hypothyroidism  TSH on admission 3.11, on Synthroid 150 mcg at home  - Continue synthroid 150 mcg       #Hyponatremia, resolved.  - Will continue to monitor sodium as rate of correction appropriate in the past 48 hours.  - Will continue with diuresis  - Daily CMP  - Continue to follow, expect to improve with diuresis     #Hypophosphatemia  Could be secondary to increase respiratory effort.   - Replace PO4  - Monitor improvement      FEN: CHO consistent  Prophylaxis: DVT warfarin  Disposition: Will need optimization of respiratory status and discharge to TCU  Code Status: DNR/DNI    Family: POC is Janice Ortizrossi [phone].    Roberto Alexander MD PhD  Internal Medicine PGY2  [phone]    Patient was seen and discussed with  who agrees with above assessment and plan.    ==================================================================    Interval HistorySubjective:  No overnight events, improved mood. Leg pain  "somewhat better, encouraged to try to walk with PT. Denies fever, chills, night sweats, headache, shortness of breath, chest pain, nausea, vomiting, diarrhea, constipation.     Objective:  Most recent vital signs:  /71 (BP Location: Left arm)  Temp 98.7  F (37.1  C) (Oral)  Resp 18  Ht 1.803 m (5' 11\")  Wt (!) 197 kg (434 lb 4.9 oz)  SpO2 97%  BMI 60.57 kg/m2  Temp:  [98.1  F (36.7  C)-98.7  F (37.1  C)] 98.7  F (37.1  C)  Heart Rate:  [102-132] 127  Resp:  [16-22] 18  BP: (105-130)/(60-93) 106/71  FiO2 (%):  [30 %] 30 %  SpO2:  [92 %-98 %] 97 %  Wt Readings from Last 2 Encounters:   09/28/17 (!) 197 kg (434 lb 4.9 oz)   08/14/17 (!) 191 kg (421 lb)       Intake/Output Summary (Last 24 hours) at 09/26/17 1426  Last data filed at 09/26/17 1400   Gross per 24 hour   Intake             1326 ml   Output             1580 ml   Net             -254 ml       Physical exam:  General: Patient lying comfortably in bed, NAD, wearing BiPAP mask  HEENT: EOMI, PERRL, no scleral icterus or injection, no bruits appreciated, difficult to appreciate JVD due to body habitus, MMM  Cardiac: RRR, no m/r/g appreciated.   Respiratory: reduced lung sounds bilaterally across all fields, no wheezing or crackles  GI: Obese, NT, no organomegaly, no signs of acute abdomen  Extremities: +2 edema bilaterally, absent pulses in right dorsalis pedis and anterior tibialis, signs of digital ischemia vs chronic fungi infection   Skin: bilateral leg wound ulcers, improving   Neuro: AOx3,  Moving all extremities sponteneously    Labs:  Results for orders placed or performed during the hospital encounter of 09/23/17 (from the past 24 hour(s))   Glucose by meter   Result Value Ref Range    Glucose 110 (H) 70 - 99 mg/dL   Glucose by meter   Result Value Ref Range    Glucose 103 (H) 70 - 99 mg/dL   Glucose by meter   Result Value Ref Range    Glucose 116 (H) 70 - 99 mg/dL   Blood gas venous   Result Value Ref Range    Ph Venous 7.50 (H) 7.32 - 7.43 " pH    PCO2 Venous 55 (H) 40 - 50 mm Hg    PO2 Venous 34 25 - 47 mm Hg    Bicarbonate Venous 43 (H) 21 - 28 mmol/L    Base Excess Venous 16.1 mmol/L    FIO2 30    Basic metabolic panel   Result Value Ref Range    Sodium 133 133 - 144 mmol/L    Potassium 3.6 3.4 - 5.3 mmol/L    Chloride 89 (L) 94 - 109 mmol/L    Carbon Dioxide 37 (H) 20 - 32 mmol/L    Anion Gap 7 3 - 14 mmol/L    Glucose 97 70 - 99 mg/dL    Urea Nitrogen 7 7 - 30 mg/dL    Creatinine 0.56 (L) 0.66 - 1.25 mg/dL    GFR Estimate >90 >60 mL/min/1.7m2    GFR Estimate If Black >90 >60 mL/min/1.7m2    Calcium 8.5 8.5 - 10.1 mg/dL   CBC with platelets   Result Value Ref Range    WBC 9.1 4.0 - 11.0 10e9/L    RBC Count 5.08 4.4 - 5.9 10e12/L    Hemoglobin 15.5 13.3 - 17.7 g/dL    Hematocrit 49.1 40.0 - 53.0 %    MCV 97 78 - 100 fl    MCH 30.5 26.5 - 33.0 pg    MCHC 31.6 31.5 - 36.5 g/dL    RDW 14.1 10.0 - 15.0 %    Platelet Count 158 150 - 450 10e9/L   Glucose by meter   Result Value Ref Range    Glucose 116 (H) 70 - 99 mg/dL   INR   Result Value Ref Range    INR 1.94 (H) 0.86 - 1.14     Internal Medicine Staff Addendum  Date of Service: 9/28/2017  I have seen and examined Mr Moreira, reviewed the data and discussed the plan of care with the care team on rounds.  I agree with the above documentation with the additions/changes to the ROS, HPI, Exam or data (including my edits in italics):    I discussed pt's care with bedside RN, case management/social work today.  I personally reviewed, labs, medications and past 24 hr notes.    Assessment/Plan/Diagnoses: plan/dx as above, which contains my edits and reflects our joint medical decision-making.     Clarke Manzo MD PhD  Internal Medicine Hospitalist & Staff Physician   of Internal Medicine   [facility]  Pager: [phone]

## 2017-09-28 NOTE — PLAN OF CARE
Problem: Patient Care Overview  Goal: Plan of Care/Patient Progress Review  Edema 6B: Initial edema evaluation completed. Patient with antibiotics in place >48 hours for LE cellulitis and WOC evaluation completed. Per vascular surgery, appropriate for light compression in setting of venous disease. Per nursing, wound drainage has decreased and therefore now appropriate to initiate use of light compression. Application of GCB from MTPs to knee creases to manage LE edema and promote wound healing. Remove wraps if causing pain/numbness or become soiled/saturated. Will change edema wraps daily in coordination with wound cares/dressing change per RN.      Anticipate patient will require follow up lymphedema care - may benefit from home health or OP services for continued use of compression wraps.

## 2017-09-28 NOTE — PLAN OF CARE
"Problem: Patient Care Overview  Goal: Plan of Care/Patient Progress Review  BP (!) 112/93 (BP Location: Left arm)  Temp 98.1  F (36.7  C) (Axillary)  Resp 22  Ht 1.803 m (5' 11\")  Wt (!) 200 kg (440 lb 14.7 oz)  SpO2 97%  BMI 61.5 kg/m2     Neuro: A&Ox4. Making needs known.  Cardiac: A-fib RVR with rates 110-130s. VSS, afebrile.         Respiratory: Sating mid 90s on 1L NC or 30% full face CPAP. Tolerated CPAP well overnight.  Last VBG with CO2 level 55 at midnight.  GI/: Adequate urine output, required st cath for 650mL after bladder scan of 475mL. No BM.  Diet/appetite: Tolerating diet. 1.5L fluid restriction.  Activity:  Assist of 2, use of lift overnight.  Was up to recliner last evening with walker and 2 assist.  Pain: At acceptable level on current regimen.   Skin: Intact, no new deficits noted. Dressings dry and intact on bilateral legs and right heel.     Plan: Continue with POC. Notify primary team with changes.          "

## 2017-09-28 NOTE — PROGRESS NOTES
09/28/17 1136   Quick Adds   Type of Visit Initial PT Evaluation       Present no   Language english   Living Environment   Lives With alone   Living Arrangements apartment   Home Accessibility tub/shower is not walk in;grab bars present (bathtub)   Number of Stairs to Enter Home 0   Number of Stairs Within Home 0   Living Environment Comment Pt reports he was not doing his own grocery shopping, but was able to get to MD appts okay via friends. Does not drive.    Self-Care   Usual Activity Tolerance fair   Current Activity Tolerance poor   Regular Exercise no   Equipment Currently Used at Home cane, straight   Activity/Exercise/Self-Care Comment Ambulating short distances with use of SEC around apartment.    Functional Level Prior   Ambulation 1-->assistive equipment   Transferring 1-->assistive equipment   Fall history within last six months yes   Number of times patient has fallen within last six months 2  (B LE weakness and giving out, LOB)   Which of the above functional risks had a recent onset or change? ambulation;transferring;fall history   Prior Functional Level Comment PLOF is Mod I with use of SEC. Mainly stayed in apartment.    General Information   Onset of Illness/Injury or Date of Surgery - Date 09/23/17   Referring Physician Roberto Alexander MD   Patient/Family Goals Statement To get up and moving   Pertinent History of Current Problem (include personal factors and/or comorbidities that impact the POC) 67 year old male with a history notable for morbid obesity, atrial fibrillation, HFrEF, diabetes, PVD, and untreated depression that was admitted following a fall and CP/SOB on 9/23.  Transferred to MICU on 9/24 for AMS with acute hypoxic/hypercarbic respiratory failure and hypotension requiring initiation of BiPAP and pressors.  Much improved following nasal airway and BiPAP, off pressors and transferred to general medicine for further management.    Precautions/Limitations fall  precautions   General Observations Pt supine in bed upon arrival. Friend Dre present and supportive. Currently on 1L of O2 via NC. Does not use O2 at baseline.    General Info Comments Activity Orders: Up in chair 3x/day and up with assist.    Cognitive Status Examination   Orientation orientation to person, place and time   Level of Consciousness alert   Follows Commands and Answers Questions 100% of the time   Personal Safety and Judgment intact   Memory intact   Pain Assessment   Patient Currently in Pain Yes, see Vital Sign flowsheet  (B LE soreness - L LE dasha with pressure)   Integumentary/Edema   Integumentary/Edema Comments B LE wrapped from malleoli to knee crease with kerlix d/t cellulitis. WOC following.    Posture    Posture Forward head position;Protracted shoulders;Kyphosis   Range of Motion (ROM)   ROM Comment B LE WFL - limited d/t body habitus   Strength   Strength Comments Demonstrates at least 3/5 B LE strength with functional mobility and transfers   Bed Mobility   Bed Mobility Comments Supine>sitting EOB with Mod-MaxA of 1 and HOB partially elevated. Use of bed railing. Frequent rest breaks taken throughout d/t fatigue.    Transfer Skills   Transfer Comments Sit<>stand from EOB with Sravanthi of 2 and wide fww.    Gait   Gait Comments Ambulates with reciprocal pattern - though significantly decreased stride length and gait speed. Low foot clearance. Wide ROBERT   Balance   Balance Comments Seated: good - use of bed railing for support. Standing: fair - CGA of 2 and fww throughout.    Sensory Examination   Sensory Perception Comments Pt reports feeling of numbness in B LE - feels this was d/t significant swelling, which is now beginning to improve so noticing it more.   General Therapy Interventions   Planned Therapy Interventions balance training;bed mobility training;gait training;ROM;strengthening;stretching;transfer training;home program guidelines;progressive activity/exercise   Clinical Impression  "  Criteria for Skilled Therapeutic Intervention yes, treatment indicated   PT Diagnosis Decreased functional mobility   Influenced by the following impairments Decreased strength and activity tolerance, impaired balance, pain   Functional limitations due to impairments Impaired ability to funcitonally navigate in home or community   Clinical Presentation Stable/Uncomplicated   Clinical Presentation Rationale PMH   Clinical Decision Making (Complexity) Moderate complexity   Therapy Frequency` 5 times/week   Predicted Duration of Therapy Intervention (days/wks) 10/4/2017   Anticipated Equipment Needs at Discharge (TBD)   Anticipated Discharge Disposition Transitional Care Facility   Risk & Benefits of therapy have been explained Yes   Patient, Family & other staff in agreement with plan of care Yes   [facility] TM \"6 Clicks\"   2016, Trustees of [facility], under license to [employer].  All rights reserved.   6 Clicks Short Forms Basic Mobility Inpatient Short Form   [facility]  \"6 Clicks\" V.2 Basic Mobility Inpatient Short Form   1. Turning from your back to your side while in a flat bed without using bedrails? 2 - A Lot   2. Moving from lying on your back to sitting on the side of a flat bed without using bedrails? 2 - A Lot   3. Moving to and from a bed to a chair (including a wheelchair)? 2 - A Lot   4. Standing up from a chair using your arms (e.g., wheelchair, or bedside chair)? 2 - A Lot   5. To walk in hospital room? 3 - A Little   6. Climbing 3-5 steps with a railing? 1 - Total   Basic Mobility Raw Score (Score out of 24.Lower scores equate to lower levels of function) 12   Total Evaluation Time   Total Evaluation Time (Minutes) 8     "

## 2017-09-28 NOTE — PLAN OF CARE
"Problem: Patient Care Overview  Goal: Plan of Care/Patient Progress Review  Outcome: No Change  Blood pressure 105/63, temperature 98.6  F (37  C), temperature source Oral, resp. rate 16, height 1.803 m (5' 11\"), weight (!) 200 kg (440 lb 14.7 oz), SpO2 97 %.    VSS, on 0.5-1L NC this evening.  Placed on CPAP for sleep, VBG ordered for 2400.  C/o discomfort in LLE, mostly when moving, improved with proper positioning.  Redness outlined, dressing changed per orders.  RLE dressing CDI.  Up to chair for 4 hours, back to bed with 2 assist and walker.  Good appetite, BS stable, no insulin needed.  T/R q 2 hours.  Pt unable to void on own, straight cathed for 500cc.  Call light within reach.  Continue with plan of care.       "

## 2017-09-28 NOTE — PROGRESS NOTES
09/28/17 1230   General Information   Discipline OT   Onset of Edema (Acute on chronic )   Affected Body Part(s) Right LE;Left LE   Etiology Comments (venous disease, decreased mobility, wounds)   Edema Precaution Comments Per Vascular Surgery, appropriate to use compression in setting of venous disease. VICK will be performed as outpatient. Antibiotics in place for LE cellulitis. WOC evaluation completed.    Pain   Pain comments patient reports pain along lateral L LE.    Edema Examination / Assessment   Skin Condition Pitting;Dryness   Skin Condition Comments Wound dressings in place on distal portions of B LEs. R dorsal foot exposed with redness noted. Unable to assess pitting due to wound dressings.    ROM   Range of Motion (WFL) no deficits were identified   Strength   Strength (WFL) (generalized weakness throughout )   Sensation   Sensation (WFL) no deficits were identified  (light touch intact. )   Assessment/Plan   Patient presents with Edema   Assessment Recommend use of light compression at this time with use of GCB to manage LE edema and promote wound healing.    Assessment of Occupational Performance 1-3 Performance Deficits   Identified Performance Deficits Decreased functional mobility    Clinical Decision Making (Complexity) Low complexity   Planned Edema Interventions Gradient compression bandaging;Fit for compression garment;Exercises;Precautions to prevent infection/exacerbation;Education   Treatment Frequency daily   Treatment Duration 1-2 weeks    Patient, Family and/or Staff in agreement with plan of care. Yes   Risks and benefits of treatment have been explained. Yes   Total Evaluation Time   Total Evaluation Time (Minutes) 5

## 2017-09-28 NOTE — PROGRESS NOTES
"[facility], [place]    Palliative Care Progress Note    Patient: Clarke Moreira  Date of Admission:  9/23/2017    Recommendations:  - ordered dose of MagCitrate  - I will follow up tomorrow and discuss patient's wishes regarding his healthcare further. He is in agreement with continuing current treatment plan including overnight BiPAP and likely TCU stay.   - he will work on his health care directive with his friend Dre on Saturday    Assessment  68y/o male with PMHx pertinent for THONG, OHS, morbid obesity, AFib with RVR, suspected long-standing depression who was admitted 9/23 after falling and being unable to get up. Was found to be hypercarbic on admission, required prolonged BiPAP treatment, now tolerates on NC during the day, BiPAP overnight.    Denies symptoms today    Is starting to settle into the routine of the regular floors and has been able to appreciate and focus on the visits by a variety of professional care staff today.     He had a long discussion with his friend Dre, who is a former , about his health and treatment preferences amongst other topics. He states he is looking forward to having some downtime tonight to think more about his preferences. I provide some questions as guides (What are my hopes and concerns for the future in light of my health condition?\", What is a \"good day\"/good quality of life?, What is a situation in which I wouldn't want any life prolonging measures?)    He shares that he is worried about going home. That he realizes that he isolated himself enough to let this (his fall and admission) happen. He feels he needs to change how he is and his apartment setup before he can consider going home. I introduce the concept of home health services and he seems open to that option. I explained that he will get help with organizing an appropriate plan at the rehab center.     Dee Florez  Pager: [phone]  [facility] Inpatient Team Consult " pager [phone] (M-F 8-4:30)  After-hours Answering Service [phone]   Palliative Clinic: [phone]       Interval History:      No significant events       Medications:   I have reviewed this patient's medication profile and medications during this hospitalization  miralax daily  bisacodyl prn - none  senna 2 bid    Ondansetron prn - none        Review of Systems:   Palliative Symptom Review (0=no symptom/no concern, 1=mild, 2=moderate, 3=severe):      Pain: 0      Fatigue: 0      Nausea: 0      Constipation: 2      Diarrhea: 0      Depressive Symptoms: 0      Anxiety: 0      Drowsiness: 0      Poor Appetite: 0      Shortness of Breath: 0      Insomnia: 0      Other: 0      Overall (0 good/no concerns, 3 very poor):  1          Physical Exam:     Vital Signs: Temp: 98.7  F (37.1  C) Temp src: Oral BP: 93/75   Heart Rate: 127 Resp: 18 SpO2: 97 % O2 Device: Nasal cannula Oxygen Delivery: 1 LPM  Weight: 434 lbs 4.9 oz    Physical Exam:  CONSTIT: awake, appears comfortable, obese  EENT: MMM, EOMI, no icterus  RESP: reg, nl effort on NC  MSK:moves x4  SKIN:  warm, no rash, no obvious lesions  NEURO: alert, oriented x3  PSYCH: appropriate affect, memory and thought process intact        Data Reviewed:    INR 1.9    Dee Florez  Pager: [phone]  [facility] Inpatient Team Consult pager [phone] (M-F 8-4:30)  After-hours Answering Service [phone]  Palliative Clinic: [phone]     Total time spent was 40 minutes,  >50% of time was spent counseling and/or coordination of care regarding treatment preferences, coping.

## 2017-09-28 NOTE — PROGRESS NOTES
" Progress Note:    Hospital Day: 5  Date of Initial SW Assessment: 9/23/17    Data: Clarke Moreira is a 68 yo male admitted to [facility] 9/23/17.    Intervention: Met w/pt to discuss dc plans and provide support.    Assessment: Pt lives alone in the commonbond independent subsidized apt in \[place]". Pt is single, never , no children, and no family. Pt has two friends, Janice and Dre. Dre will be visiting pt today. Pt does not have anyone to provide assistance at home. Pt identifies himself as an abrasive person and mentioned multiple times that he is not someone who has close friends and he has no family. 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     Pt does not usually leave his apt, but uses a cane for apt distances. Pt spends most of his time in a chair that pt states probably should have been replaced 3-4 years ago and pt feels this chair contributed to his fall. Pt stated he would talk to his friend Dre about replacing it. Pt would be interested in a Life Alert when he returns home. Pt has groceries delivered once a week and occasionally has library books delivered. These delivery services are the primary human contact pt has. Pt did not have any home care, case mgmt, or any other services prior to admission. Per pt, he was not on O2 or any other respiratory support prior to admission. Pt is currently requiring 1L NC or 30% full face CPAP.     Pt is concerned about the bills he will get after this hospitalization, but pt stated he will call and get a payment plan worked out. Pt does not feel safe to return home independently at this time and is in agreement w/recommendation for TCU. Pt has never been to TCU before and the conversation about TCU as well as reviewing the TCU list was quite overwhelming for pt. Pt does not know anything about any " of the TCUs and would like SW to make referrals to facilities around his home, then he would decide after he knows which facilities would accept him.    An adult protection report (Report #[id number]) was filed by ED MERVAT on 9/23. Writer received f/u call from Sugey Goodwin (P: [phone]) with [facility] stating that SW should call Sugey if pt has an unsafe dc plan or leaves AMA.     TCU referrals sent to:  1. [facility]- Decline d/t bariatric needs.  (Main: [phone], Admissions: [phone], Fax: [phone])  2. [facility]  (Main: [phone], Admissions: [phone])  3. [facility]  (Main: [phone], Fax: [phone])  4. [facility]- Declined d/t bariatric needs.  (Main: [phone], Admissions: [phone], Fax: [phone])  5. [facility]- Declined d/t bariatric needs.  (Main: [phone], Admissions: [phone], Fax: [phone])  6. [facility]- Declined d/t bariatric needs.  (Main: [phone], Admissions: [phone], Fax: [phone])  7. [facility]  (Main: [phone], Admissions: [phone], Fax: [phone])  8. [facility]  (Main: [phone], Admissions: [phone], Fax: [phone])  9. [facility]- Declined d/t bariatric needs.  (Main: [phone], Admissions: [phone], Fax: [phone])  10. [facility] [place]  (Main: [phone], Admissions: [phone], Fax: [phone])  11. [facility]- Declined d/t bariatric needs.  (Main: [phone], Admissions: [phone], Fax: [phone])      Plan:  -Discharge plans in progress: Referrals out to TCUs; no facility has accepted yet.   -Barriers to discharge plan: Referrals sent to many TCUs as pt's bariatric needs (434 lbs) and CPAP (pt doesn't have a home CPAP to bring) will be a barrier for many facilities.  -Follow up plan: SW will continue to follow and  assist as needed.    AARON Romero, [facility]  6B Intermediate Care Unit   Phone: [phone]  Pager: [phone]

## 2017-09-29 ENCOUNTER — APPOINTMENT (OUTPATIENT)
Dept: OCCUPATIONAL THERAPY | Facility: CLINIC | Age: 67
DRG: 291 | End: 2017-09-29
Payer: COMMERCIAL

## 2017-09-29 ENCOUNTER — APPOINTMENT (OUTPATIENT)
Dept: PHYSICAL THERAPY | Facility: CLINIC | Age: 67
DRG: 291 | End: 2017-09-29
Payer: COMMERCIAL

## 2017-09-29 LAB
ANION GAP SERPL CALCULATED.3IONS-SCNC: 4 MMOL/L (ref 3–14)
ANION GAP SERPL CALCULATED.3IONS-SCNC: 7 MMOL/L (ref 3–14)
BACTERIA SPEC CULT: NO GROWTH
BACTERIA SPEC CULT: NO GROWTH
BASE EXCESS BLDA CALC-SCNC: 14.9 MMOL/L
BASE EXCESS BLDV CALC-SCNC: 13.8 MMOL/L
BUN SERPL-MCNC: 6 MG/DL (ref 7–30)
BUN SERPL-MCNC: 8 MG/DL (ref 7–30)
CALCIUM SERPL-MCNC: 8.3 MG/DL (ref 8.5–10.1)
CALCIUM SERPL-MCNC: 8.7 MG/DL (ref 8.5–10.1)
CHLORIDE SERPL-SCNC: 88 MMOL/L (ref 94–109)
CHLORIDE SERPL-SCNC: 88 MMOL/L (ref 94–109)
CO2 SERPL-SCNC: 39 MMOL/L (ref 20–32)
CO2 SERPL-SCNC: 42 MMOL/L (ref 20–32)
CREAT SERPL-MCNC: 0.56 MG/DL (ref 0.66–1.25)
CREAT SERPL-MCNC: 0.61 MG/DL (ref 0.66–1.25)
ERYTHROCYTE [DISTWIDTH] IN BLOOD BY AUTOMATED COUNT: 14.2 % (ref 10–15)
GFR SERPL CREATININE-BSD FRML MDRD: >90 ML/MIN/1.7M2
GFR SERPL CREATININE-BSD FRML MDRD: >90 ML/MIN/1.7M2
GLUCOSE BLDC GLUCOMTR-MCNC: 103 MG/DL (ref 70–99)
GLUCOSE BLDC GLUCOMTR-MCNC: 125 MG/DL (ref 70–99)
GLUCOSE BLDC GLUCOMTR-MCNC: 126 MG/DL (ref 70–99)
GLUCOSE BLDC GLUCOMTR-MCNC: 132 MG/DL (ref 70–99)
GLUCOSE SERPL-MCNC: 107 MG/DL (ref 70–99)
GLUCOSE SERPL-MCNC: 111 MG/DL (ref 70–99)
HCO3 BLD-SCNC: 44 MMOL/L (ref 21–28)
HCO3 BLDV-SCNC: 44 MMOL/L (ref 21–28)
HCT VFR BLD AUTO: 49 % (ref 40–53)
HGB BLD-MCNC: 15.6 G/DL (ref 13.3–17.7)
INR PPP: 1.88 (ref 0.86–1.14)
LACTATE BLD-SCNC: 0.8 MMOL/L (ref 0.7–2)
MAGNESIUM SERPL-MCNC: 1.9 MG/DL (ref 1.6–2.3)
MCH RBC QN AUTO: 30.6 PG (ref 26.5–33)
MCHC RBC AUTO-ENTMCNC: 31.8 G/DL (ref 31.5–36.5)
MCV RBC AUTO: 96 FL (ref 78–100)
MRSA DNA SPEC QL NAA+PROBE: NEGATIVE
O2/TOTAL GAS SETTING VFR VENT: 30 %
O2/TOTAL GAS SETTING VFR VENT: 60 %
OXYHGB MFR BLDV: 87 %
PCO2 BLD: 70 MM HG (ref 35–45)
PCO2 BLDV: 77 MM HG (ref 40–50)
PH BLD: 7.41 PH (ref 7.35–7.45)
PH BLDV: 7.37 PH (ref 7.32–7.43)
PLATELET # BLD AUTO: 143 10E9/L (ref 150–450)
PO2 BLD: 67 MM HG (ref 80–105)
PO2 BLDV: 54 MM HG (ref 25–47)
POTASSIUM SERPL-SCNC: 3.4 MMOL/L (ref 3.4–5.3)
POTASSIUM SERPL-SCNC: 3.6 MMOL/L (ref 3.4–5.3)
RBC # BLD AUTO: 5.09 10E12/L (ref 4.4–5.9)
SODIUM SERPL-SCNC: 134 MMOL/L (ref 133–144)
SODIUM SERPL-SCNC: 135 MMOL/L (ref 133–144)
SPECIMEN SOURCE: NORMAL
WBC # BLD AUTO: 7 10E9/L (ref 4–11)

## 2017-09-29 PROCEDURE — 40000275 ZZH STATISTIC RCP TIME EA 10 MIN

## 2017-09-29 PROCEDURE — 94660 CPAP INITIATION&MGMT: CPT

## 2017-09-29 PROCEDURE — 85610 PROTHROMBIN TIME: CPT | Performed by: INTERNAL MEDICINE

## 2017-09-29 PROCEDURE — 80048 BASIC METABOLIC PNL TOTAL CA: CPT | Performed by: STUDENT IN AN ORGANIZED HEALTH CARE EDUCATION/TRAINING PROGRAM

## 2017-09-29 PROCEDURE — 36600 WITHDRAWAL OF ARTERIAL BLOOD: CPT

## 2017-09-29 PROCEDURE — 80048 BASIC METABOLIC PNL TOTAL CA: CPT | Performed by: INTERNAL MEDICINE

## 2017-09-29 PROCEDURE — 83735 ASSAY OF MAGNESIUM: CPT | Performed by: INTERNAL MEDICINE

## 2017-09-29 PROCEDURE — 85027 COMPLETE CBC AUTOMATED: CPT | Performed by: INTERNAL MEDICINE

## 2017-09-29 PROCEDURE — 25000132 ZZH RX MED GY IP 250 OP 250 PS 637: Performed by: INTERNAL MEDICINE

## 2017-09-29 PROCEDURE — 87640 STAPH A DNA AMP PROBE: CPT | Performed by: HOSPITALIST

## 2017-09-29 PROCEDURE — 36592 COLLECT BLOOD FROM PICC: CPT | Performed by: INTERNAL MEDICINE

## 2017-09-29 PROCEDURE — 40000809 ZZH STATISTIC NO DOCUMENTATION TO SUPPORT CHARGE

## 2017-09-29 PROCEDURE — 40000133 ZZH STATISTIC OT WARD VISIT: Performed by: OCCUPATIONAL THERAPIST

## 2017-09-29 PROCEDURE — 87641 MR-STAPH DNA AMP PROBE: CPT | Performed by: HOSPITALIST

## 2017-09-29 PROCEDURE — 12000006 ZZH R&B IMCU INTERMEDIATE UMMC

## 2017-09-29 PROCEDURE — 25000128 H RX IP 250 OP 636: Performed by: INTERNAL MEDICINE

## 2017-09-29 PROCEDURE — 97140 MANUAL THERAPY 1/> REGIONS: CPT | Mod: GO | Performed by: OCCUPATIONAL THERAPIST

## 2017-09-29 PROCEDURE — 25000128 H RX IP 250 OP 636: Performed by: STUDENT IN AN ORGANIZED HEALTH CARE EDUCATION/TRAINING PROGRAM

## 2017-09-29 PROCEDURE — 25000132 ZZH RX MED GY IP 250 OP 250 PS 637: Performed by: STUDENT IN AN ORGANIZED HEALTH CARE EDUCATION/TRAINING PROGRAM

## 2017-09-29 PROCEDURE — 40000274 ZZH STATISTIC RCP CONSULT EA 30 MIN

## 2017-09-29 PROCEDURE — 99233 SBSQ HOSP IP/OBS HIGH 50: CPT | Mod: GC | Performed by: INTERNAL MEDICINE

## 2017-09-29 PROCEDURE — 00000146 ZZHCL STATISTIC GLUCOSE BY METER IP

## 2017-09-29 PROCEDURE — 36592 COLLECT BLOOD FROM PICC: CPT | Performed by: STUDENT IN AN ORGANIZED HEALTH CARE EDUCATION/TRAINING PROGRAM

## 2017-09-29 PROCEDURE — 83605 ASSAY OF LACTIC ACID: CPT | Performed by: INTERNAL MEDICINE

## 2017-09-29 PROCEDURE — 82803 BLOOD GASES ANY COMBINATION: CPT | Performed by: INTERNAL MEDICINE

## 2017-09-29 PROCEDURE — 97530 THERAPEUTIC ACTIVITIES: CPT | Mod: GP

## 2017-09-29 PROCEDURE — 40000193 ZZH STATISTIC PT WARD VISIT

## 2017-09-29 PROCEDURE — 82805 BLOOD GASES W/O2 SATURATION: CPT | Performed by: INTERNAL MEDICINE

## 2017-09-29 PROCEDURE — 25000132 ZZH RX MED GY IP 250 OP 250 PS 637: Performed by: HOSPITALIST

## 2017-09-29 PROCEDURE — 97116 GAIT TRAINING THERAPY: CPT | Mod: GP

## 2017-09-29 PROCEDURE — 82803 BLOOD GASES ANY COMBINATION: CPT | Performed by: STUDENT IN AN ORGANIZED HEALTH CARE EDUCATION/TRAINING PROGRAM

## 2017-09-29 RX ORDER — ACETAMINOPHEN 325 MG/1
650 TABLET ORAL EVERY 6 HOURS PRN
Status: DISCONTINUED | OUTPATIENT
Start: 2017-09-29 | End: 2017-10-08 | Stop reason: HOSPADM

## 2017-09-29 RX ORDER — FUROSEMIDE 10 MG/ML
20 INJECTION INTRAMUSCULAR; INTRAVENOUS ONCE
Status: COMPLETED | OUTPATIENT
Start: 2017-09-29 | End: 2017-09-29

## 2017-09-29 RX ORDER — IPRATROPIUM BROMIDE AND ALBUTEROL SULFATE 2.5; .5 MG/3ML; MG/3ML
3 SOLUTION RESPIRATORY (INHALATION) EVERY 4 HOURS PRN
Status: DISCONTINUED | OUTPATIENT
Start: 2017-09-29 | End: 2017-10-08 | Stop reason: HOSPADM

## 2017-09-29 RX ORDER — METOPROLOL SUCCINATE 25 MG/1
25 TABLET, EXTENDED RELEASE ORAL ONCE
Status: COMPLETED | OUTPATIENT
Start: 2017-09-29 | End: 2017-09-29

## 2017-09-29 RX ORDER — WARFARIN SODIUM 6 MG/1
6 TABLET ORAL
Status: COMPLETED | OUTPATIENT
Start: 2017-09-29 | End: 2017-09-29

## 2017-09-29 RX ADMIN — SODIUM CHLORIDE, PRESERVATIVE FREE 5 ML: 5 INJECTION INTRAVENOUS at 02:59

## 2017-09-29 RX ADMIN — TAMSULOSIN HYDROCHLORIDE 0.4 MG: 0.4 CAPSULE ORAL at 09:27

## 2017-09-29 RX ADMIN — FUROSEMIDE 20 MG: 10 INJECTION, SOLUTION INTRAVENOUS at 01:31

## 2017-09-29 RX ADMIN — LISINOPRIL 10 MG: 10 TABLET ORAL at 09:57

## 2017-09-29 RX ADMIN — CEPHALEXIN 500 MG: 500 CAPSULE ORAL at 16:20

## 2017-09-29 RX ADMIN — CEPHALEXIN 500 MG: 500 CAPSULE ORAL at 20:25

## 2017-09-29 RX ADMIN — LEVOTHYROXINE SODIUM 150 MCG: 25 TABLET ORAL at 09:27

## 2017-09-29 RX ADMIN — SODIUM CHLORIDE, PRESERVATIVE FREE 5 ML: 5 INJECTION INTRAVENOUS at 22:03

## 2017-09-29 RX ADMIN — Medication 13 ML: at 09:57

## 2017-09-29 RX ADMIN — SODIUM CHLORIDE, PRESERVATIVE FREE 5 ML: 5 INJECTION INTRAVENOUS at 05:46

## 2017-09-29 RX ADMIN — MAGNESIUM CITRATE 150 ML: 1.75 LIQUID ORAL at 16:59

## 2017-09-29 RX ADMIN — SENNOSIDES AND DOCUSATE SODIUM 2 TABLET: 8.6; 5 TABLET ORAL at 09:26

## 2017-09-29 RX ADMIN — CEPHALEXIN 500 MG: 500 CAPSULE ORAL at 09:27

## 2017-09-29 RX ADMIN — SENNOSIDES AND DOCUSATE SODIUM 2 TABLET: 8.6; 5 TABLET ORAL at 20:25

## 2017-09-29 RX ADMIN — CEPHALEXIN 500 MG: 500 CAPSULE ORAL at 13:22

## 2017-09-29 RX ADMIN — Medication 13 ML: at 20:55

## 2017-09-29 RX ADMIN — ASPIRIN 81 MG CHEWABLE TABLET 81 MG: 81 TABLET CHEWABLE at 09:27

## 2017-09-29 RX ADMIN — ATORVASTATIN CALCIUM 40 MG: 40 TABLET, FILM COATED ORAL at 09:27

## 2017-09-29 RX ADMIN — FUROSEMIDE 40 MG: 10 INJECTION, SOLUTION INTRAVENOUS at 09:29

## 2017-09-29 RX ADMIN — SODIUM CHLORIDE, PRESERVATIVE FREE 5 ML: 5 INJECTION INTRAVENOUS at 20:26

## 2017-09-29 RX ADMIN — METOPROLOL SUCCINATE 100 MG: 50 TABLET, EXTENDED RELEASE ORAL at 09:29

## 2017-09-29 RX ADMIN — MULTIPLE VITAMINS W/ MINERALS TAB 1 TABLET: TAB at 09:30

## 2017-09-29 RX ADMIN — WARFARIN SODIUM 6 MG: 6 TABLET ORAL at 17:35

## 2017-09-29 RX ADMIN — METOPROLOL SUCCINATE 25 MG: 25 TABLET, EXTENDED RELEASE ORAL at 16:20

## 2017-09-29 RX ADMIN — ACETAMINOPHEN 650 MG: 325 TABLET, FILM COATED ORAL at 21:16

## 2017-09-29 RX ADMIN — Medication 1 G: at 09:28

## 2017-09-29 RX ADMIN — POLYETHYLENE GLYCOL 3350 17 G: 17 POWDER, FOR SOLUTION ORAL at 09:26

## 2017-09-29 ASSESSMENT — PAIN DESCRIPTION - DESCRIPTORS
DESCRIPTORS: SHARP;STABBING
DESCRIPTORS: ACHING

## 2017-09-29 NOTE — PLAN OF CARE
Afebrile, Afib HR mostly 120s today, BPs WNL, O2 sats mid-upper 90s on 2L NC. A&O x4. Denies pain, denies nausea. Pt received another dose Lasix 20mg IV this morning, urine output excellent. No BM; mag citrate ordered - pt willing, but due to bath, visitor, phone calls, meal, not yet administered. Pt now in chair with heavy help from PT and tech. Very weak and highly unlikely to be able to get to commode from bed or chair. Plan is to administer mag citrate when back in bed and to use bedpan if effective.  Appetite fair - declined bkfst, good lunch. 1500mL fluid restriction.  Bilateral LE dressings changed at 1500. R heel mepilex intact, due to be changed tomorrow.  VBG and ABG check overnight, CO2 levels elevated, requiring switch from CPap to BiPap for rest of night. Per MD, patient is fine on O2 NC during day, but is to go on CPap by 2200 each night.

## 2017-09-29 NOTE — PLAN OF CARE
Problem: Patient Care Overview  Goal: Plan of Care/Patient Progress Review  Edema 6B: Wraps removed and wound cares/dressing change performed by RN. Recommend continued use of light compression to manage edema and promote wound healing. GCB reapplied from MTPs to knee creases. Remove wraps if causing pain/numbness or become soiled/saturated.

## 2017-09-29 NOTE — PROVIDER NOTIFICATION
Notified Maroon cross cover VBG PCO2 at 77. Requests pt be switched to BIPAP. Called RT to switch him over from CPAP.

## 2017-09-29 NOTE — PROGRESS NOTES
" Progress Note:     Hospital Day: 6  Date of Initial SW Assessment: 9/23/17     Data: Clarke Moreira is a 66 yo male admitted to [facility] 9/23/17.     Intervention: Met w/pt x2 today to discuss dc plans and provide support. Collaborated w/[facility] Medicine Team.     Assessment:   9/29/17  Per Dr. Manzo, pt is not medically stable for dc today. [facility] has accepted pt when a bed is available. No beds available today. [facility] Discussed that stays at  TCU are typically shorter term, so if it looks like pt might need weeks to months of rehab, then  might transition pt to a community facility pending a community facility would have bariatric bed available. Pt will need [employer] (Ph: [phone]) stretcher transport d/t bariatric needs (434 lbs) and O2 needs.    SW discussed long term planning with pt in case pt needs additional assistance at home. Discussed services that could be provided through a county waiver and the need to be eligible for medical assistance first. Pt states he does not want to try to get onto medical assistance \"and risk impoverishing himself\". Pt states he discussed MA and services several years ago and is aware of the spend down he would have.    Pt does have a blank HCD and discussed it with his friend Dre, and will discuss it with his friend Janice today. Pt states it is not ready to be notarized yet.    Pre-Admission Screening (PAS) online form has been completed.  The Level of Care (LOC) is: Determined. Confirmation Code is:  [id number]. Patient/caregiver informed of referral to [facility] for Pre-Admission Screening for skilled nursing facility (SNF) placement and to expect a phone call post discharge from SNF.    9/28/17  Pt lives alone in the [place] independent subsidized apt in [place]. Pt is single, never , no children, and no family. Pt has two friends, Janice and Dre. Pt does not have anyone to " "provide assistance at home. Pt identifies himself as an abrasive person and mentioned multiple times that he is not someone who has close friends and he has no family. 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      Pt does not usually leave his apt, but uses a cane for apt distances. Pt spends most of his time in a chair that pt states probably should have been replaced 3-4 years ago and pt feels this chair contributed to his fall. Pt stated he would talk to his friend Dre about replacing it. Pt would be interested in a Life Alert when he returns home. Pt has groceries delivered once a week and occasionally has library books delivered. These delivery services are the primary human contact pt has. Pt did not have any home care, case mgmt, or any other services prior to admission. Per pt, he was not on O2 or any other respiratory support prior to admission. Pt is currently requiring 1L NC or 30% full face CPAP.      Pt is concerned about the bills he will get after this hospitalization, but pt stated he will call and get a payment plan worked out. Pt does not feel safe to return home independently at this time and is in agreement w/recommendation for TCU. Pt has never been to TCU before and the conversation about TCU as well as reviewing the TCU list was quite overwhelming for pt. Pt does not know anything about any of the TCUs and would like SW to make referrals to facilities around his home, then he would decide after he knows which facilities would accept him.     An adult protection report (Report #[id number]) was filed by MATT CROWELL on 9/23. Writer received f/u call from Sugey Goodwin (P: [phone]) with [facility] stating that MERVAT should call Sugey if pt has an unsafe dc plan or leaves AMA.      TCU referrals sent to:  1. [facility] d/t " bariatric needs.  (Main: [phone], Admissions: [phone], Fax: [phone])  2. [facility]  (Main: [phone], Admissions: [phone])  3.  TCU  (Main: [phone], Fax: [phone])  4. [facility]- Declined d/t bariatric needs.  (Main: [phone], Admissions: [phone], Fax: [phone])  5. [facility]- Declined d/t bariatric needs.  (Main: [phone], Admissions: [phone], Fax: [phone])  6. [facility]- Declined d/t bariatric needs.  (Main: [phone], Admissions: [phone], Fax: [phone])  7. [facility] [place]  (Main: [phone], Admissions: [phone], Fax: [phone])  8. [facility]- Declined d/t bariatric needs.  (Main: [phone], Admissions: [phone], Fax: [phone])  9. [facility]- Declined d/t bariatric needs.  (Main: [phone], Admissions: [phone], Fax: [phone])  10. [facility]  (Main: [phone], Admissions: [phone], Fax: [phone])  11. [facility]- Declined d/t bariatric needs.  (Main: [phone], Admissions: [phone], Fax: [phone])     Plan:  -Discharge plans in progress: DC to  TCU when a bed is available and pt is medically cleared to dc.   -Barriers to discharge plan: medical clearance and bed availability  -Follow up plan: SW will continue to follow and assist as needed.     AARON Romero, [facility]  6B Intermediate Care Unit   Phone: [phone]  Pager: [phone]

## 2017-09-29 NOTE — PLAN OF CARE
"Problem: Patient Care Overview  Goal: Plan of Care/Patient Progress Review  Outcome: Improving  /67 (BP Location: Left arm)  Temp 97.6  F (36.4  C) (Oral)  Resp 18  Ht 1.803 m (5' 11\")  Wt (!) 196 kg (432 lb 1.6 oz)  SpO2 95%  BMI 60.27 kg/m2    Afib rates 100-130s. 2L NC, CPAP at night. Denies need for pain meds. Voiding adequately. Tolerating diet. BS stable. Up to chair with 2 assist and walker. Legs wrapped with lymph wraps. Continue to monitor.       "

## 2017-09-29 NOTE — PLAN OF CARE
Problem: Patient Care Overview  Goal: Plan of Care/Patient Progress Review  PT 6B: Engaged pt in bed mobility min A, sit <> stand CGA x 1-2 with FWW, gait ~5ft with FWW at CGA x 2 for safety. Pt limited today by L lateral knee pain. Pt wearing 4L O2 via NC throughout session. PT recommending discharge to TCU when medically appropriate to progress IND and safety with functional mobility.

## 2017-09-29 NOTE — PLAN OF CARE
Problem: Patient Care Overview  Goal: Plan of Care/Patient Progress Review  OT:  Pt with another service provider at time of attempt.  Will try patient at a later time.

## 2017-09-29 NOTE — PROGRESS NOTES
INTERNAL MEDICINE PROGRESS NOTE    Clarke Moreira ([id number]) admitted on 9/23/2017 09/29/2017    Assessment & Plan:  Clarke Sotelo is a 67 year old male with a history notable for morbid obesity, atrial fibrillation, HFrEF, diabetes, PVD, and untreated depression that was admitted following a fall and CP/SOB on 9/23.  Transferred to MICU on 9/24 for AMS with acute hypoxic/hypercarbic respiratory failure and hypotension requiring initiation of BiPAP and pressors.  Much improved following nasal airway and BiPAP, off pressors and transferred to general medicine for further management.     #Acute on Chronic Hypercapnic Respiratory Failure  #Evidence of Pulmonary Hypertension  #Probable Obesity hypoventilation syndrome  Developed AMS, A fib with RVR to 150-160s and respiratory distress early AM 9/24/17. Sudden decompensated respiratory status could have been secondary to flash pulmonary edema secondary to a-fib with RVR, exacerbation of an obstructive or restrictive lung pathology, infectious causes (although infectious respiratory panel negative) and worsening of obesity hypoventilation syndrome. ABGs demonstrated pH 7.14, CO2 113. Following BiPAP and airway management with nasal trumpet, his ABG improved with pH 7.29, CO2 66. No concerns for hypoxemia. Mental status improved significantly once pCO2 in the 60s range. However, when off BiPAP for prolonged time he re accumulates CO2 and becomes lethargic. Echo with mild reduction of RV function, could be secondary to underlying Pulmonary HTN in the setting of chronic obstructive or restrictive lung disease with OHS, he has  BMI of 62. Responded well last night to CPAP and VBG appear stable.    - Continue CPAP at night, needs to be on at the latest 2200  - Goal O2 to keep saturations > 88%  - CPAP in the evening with VBG check at midnight  - Started DuoNebs QID  - PT ordered as pt sedentary   - Continue with lasix as below  - Incentive spirometry   - CXR on  09/29/17    #Atrial fibrillation with RVR  History of A fib in the past, not anticoagulated at home but on metoprolol for rate control. CHADS-VaSC 5. Received loading dose of amiodarone and started on amiodarone load and 6hr drip on arrival to MICU when in afib with RVR and hypotension. However, his atrial fibrillation improved once respiratory status was stabilized. Rate increased last night and persistently in the 150s-160s. Responded to 5mg IV metoprolol.  - Increase metoprolol to 150mg PO XL  - Will administer small IV doses while transitioning to PO  - On warfarin    #Hypercapnic Encephalopathy - improving  Acute mental status change early AM 9/24 felt to be secondary to acute hypercarbic respiratory failure and acute respiratory acidosis. Mental status has improved once pCO2 in the 60s with BiPAP and now on night time CPAP.  - Monitor mental status  - VBG if mental status change  - CO2 goal of ~60s       #History of HFrEF   #Pulmonary Edema  #CHF exacerbation  History of coronary artery disease/peripheral vascular disease per patient on admission. Last Echo 2008 with EF 40-45%, but on 09/24/17 recheck was described as normal. There might be a diastolic component and most suitable be HFpEF. He is on lisinopril, HCTZ, Metoprolol at home. During respiratory decompensation he did have CXR showing pulmonary edema. Which could secondary to HF exacerbation in the setting of atrial fibrillation. RV mildly reduced, IVC 3.34 without respiratory variability.  CXR from 09/26/17 with improving vascular congestion.   - Holding HCTZ while on furosemide  - continue daily diuresis with 40 mg IV lasix goal of negative 1L -1.5L  - Fluid restriction ~1.0L per day      #Peripheral Vascular Disease  #Venous Stasis   Patient reported worsening numbness/loss of sensation in his feet bilaterally over the last week, acutely worsening since admission. No signs of limb ischemia, but US did reveal anterior tibial artery and dorsalis pedis  "occlusion. Sensation might be an issue of diabetic neuropathy as well.  - Continue with right leg elevation  - VICK with toe pressures as outpatient, lymphedema wraps  - Monitor for signs of acute leg ischemia    #Depression  History of Depression and PTSD with limited social support. Declined therapy and in hospital chaplaincy services. No antidepressants at home. Ongoing concern for recent DNR/DNI code status change during this hospitalization with expressions of passive suicidality - wanting to avoid BiPap even though he understands it is a life sustaining treatment. Reports - \"the life that I live is not a life worth living.\" Psychiatry evaluated patient and did not believe depression was factoring on DNI/DNR and possible comfort care decision. Palliative care is involved and at this moment patient is agreeable to continue management and optimization of respiratory status as it is improving, but is still considering discontinuing BiPAP.   - Continue address goals of care with patient  - Palliative care following    #Lower Extremity Cellulitis - improving  Bilateral lower extremity with stasis dermatitis and ulcers. Area with some tenderness. Erythema and warmth has improve.  Started on cephalosporin on 09/23/17.   - WOC and lymphedema following  - Transition to cephalexin on 09/27/17 if continues with improving respiratory status and not requiring continuous BiPAP  - Complete 10 day course on 10/02/17    #Perisplenic Free Fluid  Noted on CT Chest in the ED 9/24, splenic laceration and perisplenic fluid collection following fall. Hgb has remained stable, no increase in abdominal distension, no abdominal pain. Per surgery, as hemoglobin stable for 24 hours post-fall, can restart anticoagulation.  - Monitor, daily abdominal exams, no tenderness or signs of acute abdomen  - Daily CBC  - Low threshold to reimage if concern for splenic hemorrhage   - General surgery consulted, appreciate recs as above  - Started " patient on warfarin    #Dyspnea  #Atypical Chest Pain, improving  Developed left sided chest pain and shortness of breath after fall from chair. History of CAD/PVD per patient, not documented in EMR. Troponins x3 negative, D-dimer elevated but CT PE study negative for embolism. Pain not improved with nitroglycerin. CK negative, rhabdo unlikely (normal renal function, potassium). Attributable to musculoskeletal at this point with dyspnea likely from HF exacerbation in setting of dilated IVC without variability with respirations.  - Cardio respiratory monitoring  - Continue PTA lipitor   - Continue ASA 81mg q day  - daily diuresis 40 mg lasix.  - Metoprolol as above       #Hypertension   On lisinopril at home given history of hypertension and DMII. Creatinine normal on admission. Now with improved hypotension.  - Continue lisinopril 10 mg q day as not on pressors      #Polycythemia   Hgb 17.2 on admission, peaked at 18.6. Could be secondary to hemoconcentration. Now normalized.       #DMII  On Metformin at home. HgbA1c 6.5 on 8/14/17.  - Low dose SSI      #Hypothyroidism  TSH on admission 3.11, on Synthroid 150 mcg at home  - Continue synthroid 150 mcg       #Hyponatremia, resolved.  - Will continue to monitor sodium as rate of correction appropriate in the past 48 hours.  - Will continue with diuresis  - Daily CMP  - Continue to follow, expect to improve with diuresis     #Hypophosphatemia  Could be secondary to increase respiratory effort.   - Replace PO4  - Monitor improvement      FEN: CHO consistent  Prophylaxis: DVT warfarin  Disposition: Will need optimization of respiratory status and discharge to TCU  Code Status: DNR/DNI    Family: POC is Janice Ortizrossi [phone].    Roberto Alexander MD PhD  Internal Medicine PGY2  [phone]    Patient was seen and discussed with  who agrees with above assessment and plan.    ==================================================================    Interval  "HistorySubjective:  Lethargic overnight due to having CPAP placed at 0100.  Improved after on BiPAP for a while. Denies fever, chills, night sweats, headache, shortness of breath, chest pain, nausea, vomiting, diarrhea, constipation.     Objective:  Most recent vital signs:  /67 (BP Location: Left arm)  Temp 97.6  F (36.4  C) (Oral)  Resp 18  Ht 1.803 m (5' 11\")  Wt (!) 196 kg (432 lb 1.6 oz)  SpO2 99%  BMI 60.27 kg/m2  Temp:  [96.6  F (35.9  C)-98.1  F (36.7  C)] 97.6  F (36.4  C)  Heart Rate:  [108-157] 121  Resp:  [16-23] 18  BP: (112-131)/(65-89) 116/67  FiO2 (%):  [2 %-30 %] 30 %  SpO2:  [94 %-99 %] 99 %  Wt Readings from Last 2 Encounters:   09/29/17 (!) 196 kg (432 lb 1.6 oz)   08/14/17 (!) 191 kg (421 lb)       Intake/Output Summary (Last 24 hours) at 09/26/17 1426  Last data filed at 09/26/17 1400   Gross per 24 hour   Intake             1326 ml   Output             1580 ml   Net             -254 ml       Physical exam:  General: Patient lying comfortably in bed, NAD, wearing BiPAP mask  HEENT: EOMI, PERRL, no scleral icterus or injection, no bruits appreciated, difficult to appreciate JVD due to body habitus, MMM  Cardiac: RRR, no m/r/g appreciated.   Respiratory: reduced lung sounds bilaterally across all fields, no wheezing or crackles  GI: Obese, NT, no organomegaly, no signs of acute abdomen  Extremities: +2 edema bilaterally, absent pulses in right dorsalis pedis and anterior tibialis, signs of digital ischemia vs chronic fungi infection   Neuro: AOx3,  Moving all extremities sponteneously    Labs:  Results for orders placed or performed during the hospital encounter of 09/23/17 (from the past 24 hour(s))   EKG 12-lead, complete   Result Value Ref Range    Interpretation ECG Click View Image link to view waveform and result    Glucose by meter   Result Value Ref Range    Glucose 139 (H) 70 - 99 mg/dL   Methicillin Resistant Staph Aureus PCR   Result Value Ref Range    Specimen Description " Nares     S Aur Meth Resis PCR Negative NEG^Negative   Glucose by meter   Result Value Ref Range    Glucose 132 (H) 70 - 99 mg/dL   Blood gas venous and oxyhgb   Result Value Ref Range    Ph Venous 7.37 7.32 - 7.43 pH    PCO2 Venous 77 (HH) 40 - 50 mm Hg    PO2 Venous 54 (H) 25 - 47 mm Hg    Bicarbonate Venous 44 (H) 21 - 28 mmol/L    FIO2 60     Oxyhemoglobin Venous 87 %    Base Excess Venous 13.8 mmol/L   Glucose by meter   Result Value Ref Range    Glucose 126 (H) 70 - 99 mg/dL   Basic metabolic panel   Result Value Ref Range    Sodium 134 133 - 144 mmol/L    Potassium 3.4 3.4 - 5.3 mmol/L    Chloride 88 (L) 94 - 109 mmol/L    Carbon Dioxide 39 (H) 20 - 32 mmol/L    Anion Gap 7 3 - 14 mmol/L    Glucose 111 (H) 70 - 99 mg/dL    Urea Nitrogen 6 (L) 7 - 30 mg/dL    Creatinine 0.56 (L) 0.66 - 1.25 mg/dL    GFR Estimate >90 >60 mL/min/1.7m2    GFR Estimate If Black >90 >60 mL/min/1.7m2    Calcium 8.7 8.5 - 10.1 mg/dL   CBC with platelets   Result Value Ref Range    WBC 7.0 4.0 - 11.0 10e9/L    RBC Count 5.09 4.4 - 5.9 10e12/L    Hemoglobin 15.6 13.3 - 17.7 g/dL    Hematocrit 49.0 40.0 - 53.0 %    MCV 96 78 - 100 fl    MCH 30.6 26.5 - 33.0 pg    MCHC 31.8 31.5 - 36.5 g/dL    RDW 14.2 10.0 - 15.0 %    Platelet Count 143 (L) 150 - 450 10e9/L   INR   Result Value Ref Range    INR 1.88 (H) 0.86 - 1.14   Lactic acid level STAT   Result Value Ref Range    Lactic Acid 0.8 0.7 - 2.0 mmol/L   Magnesium   Result Value Ref Range    Magnesium 1.9 1.6 - 2.3 mg/dL   Blood gas arterial   Result Value Ref Range    pH Arterial 7.41 7.35 - 7.45 pH    pCO2 Arterial 70 (H) 35 - 45 mm Hg    pO2 Arterial 67 (L) 80 - 105 mm Hg    Bicarbonate Arterial 44 (H) 21 - 28 mmol/L    Base Excess Art 14.9 mmol/L    FIO2 30.0    Glucose by meter   Result Value Ref Range    Glucose 103 (H) 70 - 99 mg/dL     Internal Medicine Staff Addendum  Date of Service: 9/29/2017  I have seen and examined Mr Moreira, reviewed the data and discussed the plan of care  with the care team on rounds.  I agree with the above documentation with the additions/changes to the ROS, HPI, Exam or data (including my edits in italics):    I discussed pt's care with bedside RN, case management/social work today.  I personally reviewed, labs, medications and past 24 hr notes.  Assessment/Plan/Diagnoses: plan/dx as above, which contains my edits and reflects our joint medical decision-making.     Clarke Manzo MD PhD  Internal Medicine Hospitalist & Staff Physician   of Internal Medicine   [facility]  Pager: [phone]

## 2017-09-29 NOTE — PLAN OF CARE
Afebrile, BPs WNL, O2 sats mid-upper 90s on 1L NC. HR chronic afib mostly 130s today; however increased to 150s-160s at about 1700 tonight, pt not symptomatic, MD notified. EKG showed afib RVR. Metoprolol 5mg IV administered. HRs settled back into 120s-130s. Pt had transfer orders to 5th floor, but will stay here tonight. No BM since admission, per pt, and none charted. Pt states he is passing gas and does not feel constipated. Mag citrate ordered earlier this afternoon; discussed with charge and patient, plan to administer tomorrow morning. Urine output good today.

## 2017-09-29 NOTE — PLAN OF CARE
"Problem: Patient Care Overview  Goal: Plan of Care/Patient Progress Review  Outcome: No Change  /75  Temp 98.1  F (36.7  C) (Axillary)  Resp 23  Ht 1.803 m (5' 11\")  Wt (!) 197 kg (434 lb 4.9 oz)  SpO2 96%  BMI 60.57 kg/m2     Cognitive: A&Ox4. Alert at 2000 and 0000 assessment. Somnolent/lethargic at 0400.  Cardiac: Uncontrolled a fib, HR sustains 100-160's. -120's.   Resp: 2.5L NC during day with sats maintained >88%. CPAP at night at 30%, wasn't maintaining sats, so increased to 60%. VBG drawn with PCO2 back at 77. BIPAP ordered at 30%. Released orders for ABG PCO2 back at 70.  GI/: Voids adequately and spontaneously via urinal. 20mg Lasix given x1. No BM since 9/20.   Diet/Appetite: Moderate consistent carb diet with carb counts and 1.5L fluid restriction.  Skin: Bilateral lymphedema wraps.  Access: 2 R PIV, saline locked. R triple lumen PICC, heparin locked, blood return noted on all lumens.  Activity: X2   Pain: Denies.     Triggered lactic acid protocol, Lactic back at 0.8.  Nares swab for MRSA back negative.     Will continue with plan of care and notify MD of any changes.       "

## 2017-09-30 ENCOUNTER — APPOINTMENT (OUTPATIENT)
Dept: PHYSICAL THERAPY | Facility: CLINIC | Age: 67
DRG: 291 | End: 2017-09-30
Payer: COMMERCIAL

## 2017-09-30 LAB
ANION GAP SERPL CALCULATED.3IONS-SCNC: 5 MMOL/L (ref 3–14)
BASE EXCESS BLDV CALC-SCNC: 18.2 MMOL/L
BUN SERPL-MCNC: 7 MG/DL (ref 7–30)
CALCIUM SERPL-MCNC: 8.5 MG/DL (ref 8.5–10.1)
CHLORIDE SERPL-SCNC: 89 MMOL/L (ref 94–109)
CO2 SERPL-SCNC: 42 MMOL/L (ref 20–32)
CREAT SERPL-MCNC: 0.63 MG/DL (ref 0.66–1.25)
ERYTHROCYTE [DISTWIDTH] IN BLOOD BY AUTOMATED COUNT: 14 % (ref 10–15)
GFR SERPL CREATININE-BSD FRML MDRD: >90 ML/MIN/1.7M2
GLUCOSE BLDC GLUCOMTR-MCNC: 107 MG/DL (ref 70–99)
GLUCOSE BLDC GLUCOMTR-MCNC: 118 MG/DL (ref 70–99)
GLUCOSE BLDC GLUCOMTR-MCNC: 133 MG/DL (ref 70–99)
GLUCOSE BLDC GLUCOMTR-MCNC: 142 MG/DL (ref 70–99)
GLUCOSE BLDC GLUCOMTR-MCNC: 97 MG/DL (ref 70–99)
GLUCOSE BLDC GLUCOMTR-MCNC: 98 MG/DL (ref 70–99)
GLUCOSE SERPL-MCNC: 102 MG/DL (ref 70–99)
HCO3 BLDV-SCNC: 49 MMOL/L (ref 21–28)
HCT VFR BLD AUTO: 50.4 % (ref 40–53)
HGB BLD-MCNC: 15.8 G/DL (ref 13.3–17.7)
INR PPP: 2.04 (ref 0.86–1.14)
MAGNESIUM SERPL-MCNC: 2 MG/DL (ref 1.6–2.3)
MCH RBC QN AUTO: 30.6 PG (ref 26.5–33)
MCHC RBC AUTO-ENTMCNC: 31.3 G/DL (ref 31.5–36.5)
MCV RBC AUTO: 98 FL (ref 78–100)
O2/TOTAL GAS SETTING VFR VENT: 30 %
PCO2 BLDV: 79 MM HG (ref 40–50)
PH BLDV: 7.4 PH (ref 7.32–7.43)
PLATELET # BLD AUTO: 159 10E9/L (ref 150–450)
PO2 BLDV: 35 MM HG (ref 25–47)
POTASSIUM SERPL-SCNC: 3.5 MMOL/L (ref 3.4–5.3)
RBC # BLD AUTO: 5.16 10E12/L (ref 4.4–5.9)
SODIUM SERPL-SCNC: 136 MMOL/L (ref 133–144)
WBC # BLD AUTO: 7.1 10E9/L (ref 4–11)

## 2017-09-30 PROCEDURE — 97530 THERAPEUTIC ACTIVITIES: CPT | Mod: GO

## 2017-09-30 PROCEDURE — 85027 COMPLETE CBC AUTOMATED: CPT | Performed by: INTERNAL MEDICINE

## 2017-09-30 PROCEDURE — 40000739 ZZH STATISTIC STROKE CODE W/O ACCESS

## 2017-09-30 PROCEDURE — 25000132 ZZH RX MED GY IP 250 OP 250 PS 637: Performed by: STUDENT IN AN ORGANIZED HEALTH CARE EDUCATION/TRAINING PROGRAM

## 2017-09-30 PROCEDURE — 00000146 ZZHCL STATISTIC GLUCOSE BY METER IP

## 2017-09-30 PROCEDURE — 25000128 H RX IP 250 OP 636: Performed by: INTERNAL MEDICINE

## 2017-09-30 PROCEDURE — 25000132 ZZH RX MED GY IP 250 OP 250 PS 637: Performed by: HOSPITALIST

## 2017-09-30 PROCEDURE — 25000132 ZZH RX MED GY IP 250 OP 250 PS 637: Performed by: INTERNAL MEDICINE

## 2017-09-30 PROCEDURE — 85610 PROTHROMBIN TIME: CPT | Performed by: INTERNAL MEDICINE

## 2017-09-30 PROCEDURE — 82803 BLOOD GASES ANY COMBINATION: CPT | Performed by: STUDENT IN AN ORGANIZED HEALTH CARE EDUCATION/TRAINING PROGRAM

## 2017-09-30 PROCEDURE — 12000008 ZZH R&B INTERMEDIATE UMMC

## 2017-09-30 PROCEDURE — 99232 SBSQ HOSP IP/OBS MODERATE 35: CPT | Mod: GC | Performed by: INTERNAL MEDICINE

## 2017-09-30 PROCEDURE — 80048 BASIC METABOLIC PNL TOTAL CA: CPT | Performed by: INTERNAL MEDICINE

## 2017-09-30 PROCEDURE — 83735 ASSAY OF MAGNESIUM: CPT | Performed by: INTERNAL MEDICINE

## 2017-09-30 PROCEDURE — 40000133 ZZH STATISTIC OT WARD VISIT

## 2017-09-30 PROCEDURE — 36592 COLLECT BLOOD FROM PICC: CPT | Performed by: INTERNAL MEDICINE

## 2017-09-30 PROCEDURE — 36592 COLLECT BLOOD FROM PICC: CPT | Performed by: STUDENT IN AN ORGANIZED HEALTH CARE EDUCATION/TRAINING PROGRAM

## 2017-09-30 PROCEDURE — 94660 CPAP INITIATION&MGMT: CPT

## 2017-09-30 RX ORDER — FUROSEMIDE 40 MG
40 TABLET ORAL DAILY
Status: DISCONTINUED | OUTPATIENT
Start: 2017-10-01 | End: 2017-10-03

## 2017-09-30 RX ORDER — FUROSEMIDE 80 MG
80 TABLET ORAL DAILY
Status: DISCONTINUED | OUTPATIENT
Start: 2017-10-01 | End: 2017-09-30

## 2017-09-30 RX ORDER — FUROSEMIDE 20 MG
40 TABLET ORAL DAILY
Status: DISCONTINUED | OUTPATIENT
Start: 2017-10-01 | End: 2017-09-30

## 2017-09-30 RX ORDER — WARFARIN SODIUM 6 MG/1
6 TABLET ORAL
Status: COMPLETED | OUTPATIENT
Start: 2017-09-30 | End: 2017-09-30

## 2017-09-30 RX ADMIN — SODIUM CHLORIDE, PRESERVATIVE FREE 5 ML: 5 INJECTION INTRAVENOUS at 21:33

## 2017-09-30 RX ADMIN — Medication 1 G: at 08:53

## 2017-09-30 RX ADMIN — LISINOPRIL 10 MG: 10 TABLET ORAL at 08:53

## 2017-09-30 RX ADMIN — CEPHALEXIN 500 MG: 500 CAPSULE ORAL at 21:33

## 2017-09-30 RX ADMIN — ASPIRIN 81 MG CHEWABLE TABLET 81 MG: 81 TABLET CHEWABLE at 08:53

## 2017-09-30 RX ADMIN — LEVOTHYROXINE SODIUM 150 MCG: 25 TABLET ORAL at 08:53

## 2017-09-30 RX ADMIN — SENNOSIDES AND DOCUSATE SODIUM 2 TABLET: 8.6; 5 TABLET ORAL at 21:32

## 2017-09-30 RX ADMIN — Medication 13 ML: at 10:38

## 2017-09-30 RX ADMIN — SODIUM CHLORIDE, PRESERVATIVE FREE 5 ML: 5 INJECTION INTRAVENOUS at 07:16

## 2017-09-30 RX ADMIN — SENNOSIDES AND DOCUSATE SODIUM 2 TABLET: 8.6; 5 TABLET ORAL at 08:53

## 2017-09-30 RX ADMIN — TAMSULOSIN HYDROCHLORIDE 0.4 MG: 0.4 CAPSULE ORAL at 08:53

## 2017-09-30 RX ADMIN — CEPHALEXIN 500 MG: 500 CAPSULE ORAL at 16:24

## 2017-09-30 RX ADMIN — ATORVASTATIN CALCIUM 40 MG: 40 TABLET, FILM COATED ORAL at 08:53

## 2017-09-30 RX ADMIN — Medication 13 ML: at 21:33

## 2017-09-30 RX ADMIN — CEPHALEXIN 500 MG: 500 CAPSULE ORAL at 08:53

## 2017-09-30 RX ADMIN — FUROSEMIDE 40 MG: 10 INJECTION, SOLUTION INTRAVENOUS at 08:53

## 2017-09-30 RX ADMIN — MULTIPLE VITAMINS W/ MINERALS TAB 1 TABLET: TAB at 08:53

## 2017-09-30 RX ADMIN — METOPROLOL SUCCINATE 125 MG: 25 TABLET, EXTENDED RELEASE ORAL at 08:57

## 2017-09-30 RX ADMIN — WARFARIN SODIUM 6 MG: 6 TABLET ORAL at 17:35

## 2017-09-30 RX ADMIN — CEPHALEXIN 500 MG: 500 CAPSULE ORAL at 13:34

## 2017-09-30 RX ADMIN — POLYETHYLENE GLYCOL 3350 17 G: 17 POWDER, FOR SOLUTION ORAL at 08:53

## 2017-09-30 NOTE — PROGRESS NOTES
INTERNAL MEDICINE PROGRESS NOTE    Clarke Moreira ([id number]) admitted on 9/23/2017 09/30/2017    Assessment & Plan:  Clarke Sotelo is a 67 year old male with a history notable for morbid obesity, atrial fibrillation, HFrEF, diabetes, PVD, and untreated depression that was admitted following a fall and CP/SOB on 9/23.  Transferred to MICU on 9/24 for AMS with acute hypoxic/hypercarbic respiratory failure and hypotension requiring initiation of BiPAP and pressors.  Much improved following nasal airway and BiPAP, off pressors and transferred to general medicine for further management.     #Acute on Chronic Hypercapnic Respiratory Failure  #Evidence of Pulmonary Hypertension  #Probable Obesity hypoventilation syndrome  Developed AMS, A fib with RVR to 150-160s and respiratory distress early AM 9/24/17. Sudden decompensated respiratory status could have been secondary to flash pulmonary edema secondary to a-fib with RVR, exacerbation of an obstructive or restrictive lung pathology, infectious causes (although infectious respiratory panel negative) and worsening of obesity hypoventilation syndrome. ABGs demonstrated pH 7.14, CO2 113. Following BiPAP and airway management with nasal trumpet, his ABG improved with pH 7.29, CO2 66. No concerns for hypoxemia. Mental status improved significantly once pCO2 in the 60s range. However, when off BiPAP for prolonged time he re accumulates CO2 and becomes lethargic. Echo with mild reduction of RV function, could be secondary to underlying Pulmonary HTN in the setting of chronic obstructive or restrictive lung disease with OHS, he has  BMI of 62. Responded well last night to CPAP and VBG appear stable.    - Continue CPAP at night, needs to be on at the latest 2200  - Goal O2 to keep saturations > 88%  - CPAP in the evening with VBG check at midnight  - Started DuoNebs QID  - PT ordered as pt sedentary   - Continue with lasix as below  - Incentive spirometry   - CXR on  09/29/17    #Atrial fibrillation with RVR  History of A fib in the past, not anticoagulated at home but on metoprolol for rate control. CHADS-VaSC 5. Received loading dose of amiodarone and started on amiodarone load and 6hr drip on arrival to MICU when in afib with RVR and hypotension. However, his atrial fibrillation improved once respiratory status was stabilized. Rate increased last night and persistently in the 150s-160s.   - Continue metoprolol to 150mg PO XL  - Continue warfarin    #Hypercapnic Encephalopathy - improving  Acute mental status change early AM 9/24 felt to be secondary to acute hypercarbic respiratory failure and acute respiratory acidosis. Mental status has improved once pCO2 in the 60s with BiPAP and now on night time CPAP.  - Monitor mental status  - VBG if mental status change  - CO2 goal of ~60s       #History of HFrEF   #Pulmonary Edema  #CHF exacerbation  History of coronary artery disease/peripheral vascular disease per patient on admission. Last Echo 2008 with EF 40-45%, but on 09/24/17 recheck was described as normal. There might be a diastolic component and most suitable be HFpEF. He is on lisinopril, HCTZ, Metoprolol at home. During respiratory decompensation he did have CXR showing pulmonary edema. Which could secondary to HF exacerbation in the setting of atrial fibrillation. RV mildly reduced, IVC 3.34 without respiratory variability.  CXR from 09/26/17 with improving vascular congestion. Has lost ~20 lbs of water weight.  - Transition to oral lasix 40mg on 10/01/1  - to double dose at outpatient depending on weight and dietary intake      #Peripheral Vascular Disease  #Venous Stasis   Patient reported worsening numbness/loss of sensation in his feet bilaterally over the last week, acutely worsening since admission. No signs of limb ischemia, but US did reveal anterior tibial artery and dorsalis pedis occlusion. Sensation might be an issue of diabetic neuropathy as well.  -  "Continue with right leg elevation  - VICK with toe pressures as outpatient, lymphedema wraps  - Monitor for signs of acute leg ischemia    #Depression  History of Depression and PTSD with limited social support. Declined therapy and in hospital chaplaincy services. No antidepressants at home. Ongoing concern for recent DNR/DNI code status change during this hospitalization with expressions of passive suicidality - wanting to avoid BiPap even though he understands it is a life sustaining treatment. Reports - \"the life that I live is not a life worth living.\" Psychiatry evaluated patient and did not believe depression was factoring on DNI/DNR and possible comfort care decision. Palliative care is involved and at this moment patient is agreeable to continue management and optimization of respiratory status as it is improving, but is still considering discontinuing BiPAP.   - Continue address goals of care with patient  - Palliative care following    #Lower Extremity Cellulitis - improving  Bilateral lower extremity with stasis dermatitis and ulcers. Area with some tenderness. Erythema and warmth has improve.  Started on cephalosporin on 09/23/17.   - WOC and lymphedema following  - Transition to cephalexin on 09/27/17 if continues with improving respiratory status and not requiring continuous BiPAP  - Complete 10 day course on 10/02/17    #Perisplenic Free Fluid  Noted on CT Chest in the ED 9/24, splenic laceration and perisplenic fluid collection following fall. Hgb has remained stable, no increase in abdominal distension, no abdominal pain. Per surgery, as hemoglobin stable for 24 hours post-fall, can restart anticoagulation.  - Monitor, daily abdominal exams, no tenderness or signs of acute abdomen  - Daily CBC  - Low threshold to reimage if concern for splenic hemorrhage   - General surgery consulted, appreciate recs as above  - Started patient on warfarin    #Dyspnea  #Atypical Chest Pain, improving  Developed left " sided chest pain and shortness of breath after fall from chair. History of CAD/PVD per patient, not documented in EMR. Troponins x3 negative, D-dimer elevated but CT PE study negative for embolism. Pain not improved with nitroglycerin. CK negative, rhabdo unlikely (normal renal function, potassium). Attributable to musculoskeletal at this point with dyspnea likely from HF exacerbation in setting of dilated IVC without variability with respirations.  - Cardio respiratory monitoring  - Continue PTA lipitor   - Continue ASA 81mg q day  - daily diuresis 40 mg lasix.  - Metoprolol as above       #Hypertension   On lisinopril at home given history of hypertension and DMII. Creatinine normal on admission. Now with improved hypotension.  - Continue lisinopril 10 mg q day as not on pressors      #Polycythemia   Hgb 17.2 on admission, peaked at 18.6. Could be secondary to hemoconcentration. Now normalized.       #DMII  On Metformin at home. HgbA1c 6.5 on 8/14/17.  - Low dose SSI      #Hypothyroidism  TSH on admission 3.11, on Synthroid 150 mcg at home  - Continue synthroid 150 mcg       #Hyponatremia, resolved.  - Will continue to monitor sodium as rate of correction appropriate in the past 48 hours.  - Will continue with diuresis  - Daily CMP  - Continue to follow, expect to improve with diuresis     #Hypophosphatemia  Could be secondary to increase respiratory effort.   - Replace PO4  - Monitor improvement      FEN: CHO consistent  Prophylaxis: DVT warfarin  Disposition: Will need optimization of respiratory status and discharge to TCU  Code Status: DNR/DNI    Family: POC is Janice Kady [phone].    Roberto Alexander MD PhD  Internal Medicine PGY2  [phone]    Patient was seen and discussed with  who agrees with above assessment and plan.    ==================================================================    Interval HistorySubjective:  Stroke code activated overnight, as patient found to have facial dyssymmetry.  "This was cancelled after following assessment from neurology. Evaluated at bedside without focal neurological deficit. Denies fever, chills, night sweats, headache, shortness of breath, chest pain, nausea, vomiting, diarrhea, constipation.     Objective:  Most recent vital signs:  /70 (BP Location: Left arm)  Pulse 106  Temp 98.1  F (36.7  C) (Oral)  Resp 16  Ht 1.803 m (5' 11\")  Wt (!) 196 kg (432 lb 1.6 oz)  SpO2 94%  BMI 60.27 kg/m2  Temp:  [97.6  F (36.4  C)-98.1  F (36.7  C)] 98.1  F (36.7  C)  Pulse:  [106] 106  Heart Rate:  [101-124] 104  Resp:  [15-20] 16  BP: (103-140)/(64-86) 109/70  FiO2 (%):  [30 %] 30 %  SpO2:  [94 %-100 %] 94 %  Wt Readings from Last 2 Encounters:   09/29/17 (!) 196 kg (432 lb 1.6 oz)   08/14/17 (!) 191 kg (421 lb)       Intake/Output Summary (Last 24 hours) at 09/26/17 1426  Last data filed at 09/26/17 1400   Gross per 24 hour   Intake             1326 ml   Output             1580 ml   Net             -254 ml       Physical exam:  General: Patient lying comfortably in bed, NAD, wearing BiPAP mask  HEENT: EOMI, PERRL, no scleral icterus or injection, no bruits appreciated, difficult to appreciate JVD due to body habitus, MMM  Cardiac: RRR, no m/r/g appreciated.   Respiratory: reduced lung sounds bilaterally across all fields, no wheezing or crackles  GI: Obese, NT, no organomegaly, no signs of acute abdomen  Extremities: +2 edema bilaterally, absent pulses in right dorsalis pedis and anterior tibialis, signs of digital ischemia vs chronic fungi infection   Neuro: AOx3,  Moving all extremities sponteneously    Labs:  Results for orders placed or performed during the hospital encounter of 09/23/17 (from the past 24 hour(s))   Glucose by meter   Result Value Ref Range    Glucose 125 (H) 70 - 99 mg/dL   Basic metabolic panel   Result Value Ref Range    Sodium 135 133 - 144 mmol/L    Potassium 3.6 3.4 - 5.3 mmol/L    Chloride 88 (L) 94 - 109 mmol/L    Carbon Dioxide 42 (H) 20 - " 32 mmol/L    Anion Gap 4 3 - 14 mmol/L    Glucose 107 (H) 70 - 99 mg/dL    Urea Nitrogen 8 7 - 30 mg/dL    Creatinine 0.61 (L) 0.66 - 1.25 mg/dL    GFR Estimate >90 >60 mL/min/1.7m2    GFR Estimate If Black >90 >60 mL/min/1.7m2    Calcium 8.3 (L) 8.5 - 10.1 mg/dL   Glucose by meter   Result Value Ref Range    Glucose 133 (H) 70 - 99 mg/dL   Glucose by meter   Result Value Ref Range    Glucose 118 (H) 70 - 99 mg/dL   Basic metabolic panel   Result Value Ref Range    Sodium 136 133 - 144 mmol/L    Potassium 3.5 3.4 - 5.3 mmol/L    Chloride 89 (L) 94 - 109 mmol/L    Carbon Dioxide 42 (H) 20 - 32 mmol/L    Anion Gap 5 3 - 14 mmol/L    Glucose 102 (H) 70 - 99 mg/dL    Urea Nitrogen 7 7 - 30 mg/dL    Creatinine 0.63 (L) 0.66 - 1.25 mg/dL    GFR Estimate >90 >60 mL/min/1.7m2    GFR Estimate If Black >90 >60 mL/min/1.7m2    Calcium 8.5 8.5 - 10.1 mg/dL   CBC with platelets   Result Value Ref Range    WBC 7.1 4.0 - 11.0 10e9/L    RBC Count 5.16 4.4 - 5.9 10e12/L    Hemoglobin 15.8 13.3 - 17.7 g/dL    Hematocrit 50.4 40.0 - 53.0 %    MCV 98 78 - 100 fl    MCH 30.6 26.5 - 33.0 pg    MCHC 31.3 (L) 31.5 - 36.5 g/dL    RDW 14.0 10.0 - 15.0 %    Platelet Count 159 150 - 450 10e9/L   INR   Result Value Ref Range    INR 2.04 (H) 0.86 - 1.14   Magnesium   Result Value Ref Range    Magnesium 2.0 1.6 - 2.3 mg/dL   Blood gas venous   Result Value Ref Range    Ph Venous 7.40 7.32 - 7.43 pH    PCO2 Venous 79 (HH) 40 - 50 mm Hg    PO2 Venous 35 25 - 47 mm Hg    Bicarbonate Venous 49 (HH) 21 - 28 mmol/L    Base Excess Venous 18.2 mmol/L    FIO2 30.0    Glucose by meter   Result Value Ref Range    Glucose 98 70 - 99 mg/dL   Glucose by meter   Result Value Ref Range    Glucose 107 (H) 70 - 99 mg/dL     Internal Medicine Staff Addendum  Date of Service: 9/30/2017  I have seen and examined Mr Moreira, reviewed the data and discussed the plan of care with the care team on rounds.  I agree with the above documentation with the additions/changes  to the ROS, HPI, Exam or data (including my edits in italics):    I discussed pt's care with bedside RN, case management/social work today.  I personally reviewed, labs, medications and past 24 hr notes.  Assessment/Plan/Diagnoses: plan/dx as above, which contains my edits and reflects our joint medical decision-making.     Clarke Manzo MD PhD  Internal Medicine Hospitalist & Staff Physician   of Internal Medicine   [facility]  Pager: [phone]

## 2017-09-30 NOTE — PROGRESS NOTES
Transferred to:  room 4  Via: bed  Reason for transfer:Pt no longer appropriate for 6B- improved patient condition  Family: Aware of transfer  Belongings: Packed and sent with pt  Chart: Delivered with pt to next unit  Medications: Meds received from old unit with pt  Pt status: Pt alert and oriented. Pt denied pain. VSS. No issues with transfer.    Negrita Walsh RN  09/30/17  3:06 PM

## 2017-09-30 NOTE — PROVIDER NOTIFICATION
-------------------CRITICAL LAB VALUE-------------------    Lab Value: CO2-79, HCO3-49  Time of notification: 7:49 AM  MD notified: Roberto Alexander MD  Patient status: alert, no complaints  Temp:  [97.6  F (36.4  C)-98.1  F (36.7  C)] 97.6  F (36.4  C)  Heart Rate:  [101-129] 101  Resp:  [15-20] 17  BP: (103-116)/(64-73) 106/69  FiO2 (%):  [30 %] 30 %  SpO2:  [94 %-99 %] 98 %  Orders received: MD to see pt at bedside.    Negrita Walsh RN  9/30/17  7:51 AM

## 2017-09-30 NOTE — PROVIDER NOTIFICATION
At 0400 assessment I noticed patient had slurred speech, asymmetrical facial smile, and was confused. I called Charge (Raffy) and we agreed to call a stroke code. Stroke code was called @ 0341.     Stroke code called off @ 0406

## 2017-09-30 NOTE — PLAN OF CARE
Problem: Patient Care Overview  Goal: Plan of Care/Patient Progress Review  Outcome: No Change  Patient arrived from 6B to 7C at 2:45. AVSS on 2L nasal cannula. Patient reported a headache upon arrival but declined Tylenol at this moment. Settled into room, personal belongings transferred over. Report given to oncoming nurse.

## 2017-09-30 NOTE — PROVIDER NOTIFICATION
Time of notification: 9:00 PM  MD notified: Maria Cross cover   Patient complained of increasing pain in LLE. LLE +2 edema.  Patient status:  Temp:  [96.6  F (35.9  C)-98.1  F (36.7  C)] 97.8  F (36.6  C)  Heart Rate:  [107-142] 107  Resp:  [15-23] 15  BP: (103-131)/(64-89) 103/64  FiO2 (%):  [2 %-30 %] 30 %  SpO2:  [94 %-99 %] 97 %  Orders received: Tylenol PRN and MD at bedside for assessment.

## 2017-09-30 NOTE — CONSULTS
[facility], [place]      Neurology Stroke Code    Patient Name: Clarke Moreira  : 1950 MRN#: [id number]    STROKE DATA     Stroke Code:  Time called:  2017  Time patient seen:  2017  Onset of symptoms:  2017  Last known normal (pt's baseline):  2017 0000  Head CT read by n/a at:  None obtained, stroke code deescalated  Stroke Code de-escalated at 2017 0404 after discussion with Dr. Christophe Stearns due to symptoms not likely caused by stroke and symptoms are chronic and do not represent acute neurologic deficit.        TPA treatment:  Not given due to minor / isolated / quickly resolving stroke symptoms.    National Institutes [employer] Health Stroke Scale (at presentation)  NIHSS done at:  time patient seen      Score    Level of consciousness:  (0)   Alert, keenly responsive    LOC questions:  (0)   Answers both questions correctly    LOC commands:  (0)   Performs both tasks correctly    Best gaze:  (0)   Normal    Visual:  (0)   No visual loss    Facial palsy:  (0)   Normal symmetrical movements    Motor arm (left):  (0)   No drift    Motor arm (right):  (0)   No drift    Motor leg (left):  (2)   Some effort against gravity    Motor leg (right):  (2)   Some effort against gravity    Limb ataxia:  (2)   Present in two limbs    Sensory:  (1)   Mild to moderate sensory loss    Best language:  (0)   Normal- no aphasia    Dysarthria:  (0)   Normal    Extinction and inattention:  (0)   No abnormality        NIHSS Total Score:  7           ASSESSMENT & RECOMMENDATONS     Stroke code activated due to right facial weakness and aphasia.  On examination minutes later, he did not have any new neurologic symptoms and attributed his inability to speak to dry mouth.  His lower extremity weakness is chronic and he was scored for ataxia only because he could not fully move his legs.  Bilateral lower extremity decrease in sensation appears to be his  baseline and is unlikely to be due to stroke.  Because he does not appear to have any new neurologic symptoms on NIHSS, we will deescalate the stroke code.    Recommendations:   -Deescalate stroke code  -No further recommendations at this time    The patient will be managed by the [place] [id number] team team and  we will sign off at this time.  Thank you for the consult.  Contact the stroke team if you have any further questions    The patient was discussed with the Fellow, Dr. Christophe Stearns.  The staff is Dr. Cantu.  Clarke Pendleton MD   Pager: [phone]

## 2017-09-30 NOTE — PHARMACY
Pharmacy Stroke Code Response  Pharmacist responded as part of the Stroke Code Team activation to patient care area 6B.      The Stroke Team determined that the patient was not a candidate for IV alteplase therapy and the pharmacy team was dismissed at 0400.    Darby Hernandez, MirtaD, BCPS

## 2017-09-30 NOTE — PLAN OF CARE
"Problem: Patient Care Overview  Goal: Plan of Care/Patient Progress Review  Outcome: No Change  /69 (BP Location: Left arm)  Temp 97.6  F (36.4  C) (Oral)  Resp 17  Ht 1.803 m (5' 11\")  Wt (!) 196 kg (432 lb 1.6 oz)  SpO2 98%  BMI 60.27 kg/m2  Neuro: A&Ox4. Pleasant.  Patient did have an episode around 0340 of slurred speech and asymmetrical smile. Stroke code called. Neuro called off stroke code @ 0404. (See neuro note)   Cardiac: AFIB 100s-130s. VSS.          Respiratory: Sating 97-99% on 2L NC. CPAP @ night on 30 FiO2.   GI/: Adequate urine output per urinal. No BM on shift. Scheduled senna given. Pt reports passing gas. Bowel sounds present. Last BM 9/20/17  Diet/appetite: Tolerating mod carb diet. Eating well.  Activity:  Assist of 2, up to chair.   Pain: Complains of pain in LLE. Tylenol given x1.     Skin: Bilateral LE dressings changed per orders. Edema +2 in LE.   LDA's: Triple lumen R PICC. Hep locked.       Plan: Continue with POC. Notify primary team with changes.           "

## 2017-09-30 NOTE — PLAN OF CARE
Problem: Patient Care Overview  Goal: Plan of Care/Patient Progress Review  Discharge Planner OT   Patient plan for discharge: none stated  Current status: Pt. Required mod-max A for bed mobility and transfer from EOB to chair for ADL tasks.   Barriers to return to prior living situation: Decreased independence with ADLS, functional mobility and activity tolerance.   Recommendations for discharge: TCU  Rationale for recommendations: continued therapy for increased independence with functional mobility, ADLS and activity tolerance       Entered by: Anne Marie Davis 09/30/2017 10:43 AM

## 2017-09-30 NOTE — PROGRESS NOTES
Paged for pain in LLE by bedside nurse. Patient states he is noticing ongoing pain in LLE in the lateral mid calf over the last several days that he describes as superficial and notable when he moves his leg, particularly when rotating it laterally. It is most pronounced on the lateral aspect of the leg with some radiation proximally.  On exam, LLE swollen with 2+ edema. LE wraps in place bilaterally. No increased erythema. Some discomfort with palpation of the calf.   Concern for DVT lower given chronicity of problem and patient being on warfarin. Felt most likely acute pain being felt due to Will manage symptomatically with Tylenol for time being given patient with high amount of sedation with pain meds in past. Will continue to monitor. Patient agreeable with plan.

## 2017-10-01 ENCOUNTER — APPOINTMENT (OUTPATIENT)
Dept: OCCUPATIONAL THERAPY | Facility: CLINIC | Age: 67
DRG: 291 | End: 2017-10-01
Payer: COMMERCIAL

## 2017-10-01 ENCOUNTER — APPOINTMENT (OUTPATIENT)
Dept: PHYSICAL THERAPY | Facility: CLINIC | Age: 67
DRG: 291 | End: 2017-10-01
Payer: COMMERCIAL

## 2017-10-01 LAB
ANION GAP SERPL CALCULATED.3IONS-SCNC: 5 MMOL/L (ref 3–14)
BUN SERPL-MCNC: 6 MG/DL (ref 7–30)
CALCIUM SERPL-MCNC: 9 MG/DL (ref 8.5–10.1)
CHLORIDE SERPL-SCNC: 86 MMOL/L (ref 94–109)
CO2 SERPL-SCNC: 43 MMOL/L (ref 20–32)
CREAT SERPL-MCNC: 0.61 MG/DL (ref 0.66–1.25)
GFR SERPL CREATININE-BSD FRML MDRD: >90 ML/MIN/1.7M2
GLUCOSE BLDC GLUCOMTR-MCNC: 103 MG/DL (ref 70–99)
GLUCOSE BLDC GLUCOMTR-MCNC: 103 MG/DL (ref 70–99)
GLUCOSE BLDC GLUCOMTR-MCNC: 105 MG/DL (ref 70–99)
GLUCOSE BLDC GLUCOMTR-MCNC: 111 MG/DL (ref 70–99)
GLUCOSE BLDC GLUCOMTR-MCNC: 118 MG/DL (ref 70–99)
GLUCOSE SERPL-MCNC: 99 MG/DL (ref 70–99)
INR PPP: 1.88 (ref 0.86–1.14)
INTERPRETATION ECG - MUSE: NORMAL
PHOSPHATE SERPL-MCNC: 3.1 MG/DL (ref 2.5–4.5)
POTASSIUM SERPL-SCNC: 3.2 MMOL/L (ref 3.4–5.3)
SODIUM SERPL-SCNC: 134 MMOL/L (ref 133–144)

## 2017-10-01 PROCEDURE — 25000132 ZZH RX MED GY IP 250 OP 250 PS 637: Performed by: INTERNAL MEDICINE

## 2017-10-01 PROCEDURE — 84100 ASSAY OF PHOSPHORUS: CPT | Performed by: INTERNAL MEDICINE

## 2017-10-01 PROCEDURE — 00000146 ZZHCL STATISTIC GLUCOSE BY METER IP

## 2017-10-01 PROCEDURE — 40000802 ZZH SITE CHECK

## 2017-10-01 PROCEDURE — 40000275 ZZH STATISTIC RCP TIME EA 10 MIN

## 2017-10-01 PROCEDURE — 97140 MANUAL THERAPY 1/> REGIONS: CPT | Mod: GO

## 2017-10-01 PROCEDURE — 97110 THERAPEUTIC EXERCISES: CPT | Mod: GP | Performed by: PHYSICAL THERAPIST

## 2017-10-01 PROCEDURE — 97116 GAIT TRAINING THERAPY: CPT | Mod: GP | Performed by: PHYSICAL THERAPIST

## 2017-10-01 PROCEDURE — 94660 CPAP INITIATION&MGMT: CPT

## 2017-10-01 PROCEDURE — 25000132 ZZH RX MED GY IP 250 OP 250 PS 637: Performed by: HOSPITALIST

## 2017-10-01 PROCEDURE — 12000001 ZZH R&B MED SURG/OB UMMC

## 2017-10-01 PROCEDURE — 85610 PROTHROMBIN TIME: CPT | Performed by: INTERNAL MEDICINE

## 2017-10-01 PROCEDURE — 99233 SBSQ HOSP IP/OBS HIGH 50: CPT | Performed by: INTERNAL MEDICINE

## 2017-10-01 PROCEDURE — 97530 THERAPEUTIC ACTIVITIES: CPT | Mod: GP | Performed by: PHYSICAL THERAPIST

## 2017-10-01 PROCEDURE — 36592 COLLECT BLOOD FROM PICC: CPT | Performed by: INTERNAL MEDICINE

## 2017-10-01 PROCEDURE — 84132 ASSAY OF SERUM POTASSIUM: CPT | Performed by: INTERNAL MEDICINE

## 2017-10-01 PROCEDURE — 25000132 ZZH RX MED GY IP 250 OP 250 PS 637: Performed by: STUDENT IN AN ORGANIZED HEALTH CARE EDUCATION/TRAINING PROGRAM

## 2017-10-01 PROCEDURE — 40000193 ZZH STATISTIC PT WARD VISIT: Performed by: PHYSICAL THERAPIST

## 2017-10-01 PROCEDURE — 80048 BASIC METABOLIC PNL TOTAL CA: CPT | Performed by: INTERNAL MEDICINE

## 2017-10-01 PROCEDURE — 25000128 H RX IP 250 OP 636: Performed by: INTERNAL MEDICINE

## 2017-10-01 PROCEDURE — 40000133 ZZH STATISTIC OT WARD VISIT

## 2017-10-01 RX ADMIN — ACETAMINOPHEN 650 MG: 325 TABLET, FILM COATED ORAL at 16:49

## 2017-10-01 RX ADMIN — SODIUM CHLORIDE, PRESERVATIVE FREE 5 ML: 5 INJECTION INTRAVENOUS at 01:12

## 2017-10-01 RX ADMIN — CEPHALEXIN 500 MG: 500 CAPSULE ORAL at 13:41

## 2017-10-01 RX ADMIN — POLYETHYLENE GLYCOL 3350 17 G: 17 POWDER, FOR SOLUTION ORAL at 09:20

## 2017-10-01 RX ADMIN — SENNOSIDES AND DOCUSATE SODIUM 2 TABLET: 8.6; 5 TABLET ORAL at 09:24

## 2017-10-01 RX ADMIN — SENNOSIDES AND DOCUSATE SODIUM 2 TABLET: 8.6; 5 TABLET ORAL at 20:09

## 2017-10-01 RX ADMIN — CEPHALEXIN 500 MG: 500 CAPSULE ORAL at 09:24

## 2017-10-01 RX ADMIN — TAMSULOSIN HYDROCHLORIDE 0.4 MG: 0.4 CAPSULE ORAL at 09:24

## 2017-10-01 RX ADMIN — ASPIRIN 81 MG CHEWABLE TABLET 81 MG: 81 TABLET CHEWABLE at 09:23

## 2017-10-01 RX ADMIN — POTASSIUM CHLORIDE 40 MEQ: 750 TABLET, EXTENDED RELEASE ORAL at 16:50

## 2017-10-01 RX ADMIN — METOPROLOL SUCCINATE 125 MG: 25 TABLET, EXTENDED RELEASE ORAL at 09:21

## 2017-10-01 RX ADMIN — Medication 13 ML: at 09:20

## 2017-10-01 RX ADMIN — CEPHALEXIN 500 MG: 500 CAPSULE ORAL at 16:49

## 2017-10-01 RX ADMIN — FUROSEMIDE 40 MG: 20 TABLET ORAL at 09:23

## 2017-10-01 RX ADMIN — LISINOPRIL 10 MG: 10 TABLET ORAL at 09:25

## 2017-10-01 RX ADMIN — SODIUM CHLORIDE, PRESERVATIVE FREE 5 ML: 5 INJECTION INTRAVENOUS at 23:39

## 2017-10-01 RX ADMIN — POTASSIUM CHLORIDE 20 MEQ: 750 TABLET, EXTENDED RELEASE ORAL at 19:16

## 2017-10-01 RX ADMIN — WARFARIN SODIUM 7 MG: 6 TABLET ORAL at 18:41

## 2017-10-01 RX ADMIN — Medication 13 ML: at 20:10

## 2017-10-01 RX ADMIN — Medication 1 G: at 09:23

## 2017-10-01 RX ADMIN — SODIUM CHLORIDE, PRESERVATIVE FREE 5 ML: 5 INJECTION INTRAVENOUS at 20:10

## 2017-10-01 RX ADMIN — MULTIPLE VITAMINS W/ MINERALS TAB 1 TABLET: TAB at 09:24

## 2017-10-01 RX ADMIN — ATORVASTATIN CALCIUM 40 MG: 40 TABLET, FILM COATED ORAL at 09:24

## 2017-10-01 RX ADMIN — CEPHALEXIN 500 MG: 500 CAPSULE ORAL at 20:09

## 2017-10-01 RX ADMIN — LEVOTHYROXINE SODIUM 150 MCG: 25 TABLET ORAL at 09:24

## 2017-10-01 ASSESSMENT — PAIN DESCRIPTION - DESCRIPTORS
DESCRIPTORS: ACHING;PRESSURE
DESCRIPTORS: ACHING;SHARP
DESCRIPTORS: SHARP

## 2017-10-01 NOTE — PLAN OF CARE
Problem: Patient Care Overview  Goal: Plan of Care/Patient Progress Review  Patient alert and orientated. Vitals stable except tachycardia.  On 2/L of oxygen 94%. Negligible pain. Denies nausea and vomiting. On sliding scale insulin. No insulin coverage needed this shift. Triple lumen PICC in right arm. 2 person assist with mechanical lift.  Bipap/Cpap Machine in place. New supplies ordered for leg dressings. Will continue with plan of care

## 2017-10-01 NOTE — PROGRESS NOTES
[facility], [place]    Internal Medicine Progress Note - [facility] Service    Main Plans for Today   Continue CPAP  PT/OT  Discharge pending bed availability    Assessment & Plan   Clarke Moreira is a 67 year old male admitted on 9/23/2017. He has a history of morbid obesity, Afib, chronic systolic CHF, DM, PVD, and untreated depression who was admitted to the ICU following falls and generalized weakness for severe acute on chronic hypercapnic respiratory failure.     # Acute on chronic hypoxic hypercapnic respiratory failure  Likely obesity hypoventilation syndrome and severe THONG, additionally exacerbated by pulmonary edema.  Clinically, stable, on CPAP, VBG results consistent with chronic CO2 retention which has been stable without additional intervention   - Minimum O2 supplimentation to maintain Sats > 88% ( will normalize with deep breathing)   - Night time CPAP   - PT/OT, may benefit from pulmonary rehab/aqua therapy in the future   - Duonebs prn   - Will need outpatient PFTs and sleep study   - Incentive spirometry    #Atrial fibrillation with RVR   - On admission was tachycardic in the 150s to 160s, likely in the context of acute respiratory acidosis, and volume overload   - Slowly titrating Metoprolol XL (currently 150 mg) still tachy 90s-110s, but improved.    - s/p Amiodarone load    #Acute on chronic systolic CHF, prior Echo cardiograms with mildly reduced EF 40-45%   - Pulmonary edema likely exacerbated by uncontrolled Afib   - Consistently diuresed over his hospital stay, currently net negative ~12 kg.  Appear slightly dry on exam   - Goal of net euvolemia to slightly positive here on out   - Will need daily weights and active diuretic titration following discharge    #Depression   - Currently untreated, exacerbated by likely THONG and CO2 narcosis     # Lower Extremity cellulitis   - Clinically improving will complete 10 days of abx on 10/2.    # Disposition:   - Pending  TCU placement.       Diet: Fluid restriction 1500 ML FLUID  Moderate Consistent CHO Diet  Fluids: None  DVT Prophylaxis: Heparin SQ  Code Status: DNR / DNI    Disposition Plan   Expected discharge: Tomorrow, recommended to transitional care unit once safe disposition plan/ TCU bed available.     Entered: Clarke Manzo 10/01/2017, 7:05 AM   Information in the above section will display in the discharge planner report.      The patient's care was discussed with the Bedside Nurse and Patient.    Clarke Manzo  [facility] 4  Pager: [phone]  Please see sticky note for cross cover information    Interval History   Patient seen and examined, no acute overnight events.  Tolerated CPAP overnight, though initiation was delayed due to patient transfer.  Otherwise denies dizziness, chest pain, dyspnea, confusion, N/V abdominal pain or other symptoms currently.  Still has bilateral leg pain.     Physical Exam   Vital Signs: Temp: 97.5  F (36.4  C) Temp src: Oral BP: 111/83 Pulse: 105 Heart Rate: 115 Resp: 16 SpO2: 96 % O2 Device: BiPAP/CPAP Oxygen Delivery: 2 LPM  Weight: 432 lbs 1.63 oz  General Appearance: NAD  Respiratory: Distant breath sounds, normal work of breathing  Cardiovascular: tachycardic, irregular, S1/S2, no m,r,g  GI: obese, soft, NT, ND  Skin: chronic venostasis changes, resolving LLE cellulits  Other: CN II-XII grossly intact, no focal deficits      Data   Medications     Warfarin Therapy Reminder       - MEDICATION INSTRUCTIONS -         furosemide  40 mg Oral Daily     metoprolol  125 mg Oral Daily     tamsulosin  0.4 mg Oral Daily     cephalexin  500 mg Oral 4x Daily     vitamin A-D & C drops  13 mL Oral BID     polyethylene glycol  17 g Oral Daily     senna-docusate  2 tablet Oral BID     insulin aspart  1-7 Units Subcutaneous TID AC     insulin aspart  1-5 Units Subcutaneous At Bedtime     sodium chloride (PF)  3 mL Intracatheter Q8H     heparin lock flush  5-10 mL  Intracatheter Q24H     aspirin  81 mg Oral Daily     atorvastatin  40 mg Oral Daily     levothyroxine  150 mcg Oral Daily     lisinopril  10 mg Oral Daily     multivitamin, therapeutic with minerals  1 tablet Oral Daily     fish oil-omega-3 fatty acids  1 g Oral Daily     Data     Recent Labs  Lab 09/30/17  0356 09/29/17  2207 09/29/17  0624 09/28/17  1021 09/28/17  0554   WBC 7.1  --  7.0  --  9.1   HGB 15.8  --  15.6  --  15.5   MCV 98  --  96  --  97     --  143*  --  158   INR 2.04*  --  1.88* 1.94*  --     135 134  --  133   POTASSIUM 3.5 3.6 3.4  --  3.6   CHLORIDE 89* 88* 88*  --  89*   CO2 42* 42* 39*  --  37*   BUN 7 8 6*  --  7   CR 0.63* 0.61* 0.56*  --  0.56*   ANIONGAP 5 4 7  --  7   CHERI 8.5 8.3* 8.7  --  8.5   * 107* 111*  --  97     No results found for this or any previous visit (from the past 24 hour(s)).

## 2017-10-01 NOTE — PLAN OF CARE
Problem: Patient Care Overview  Goal: Plan of Care/Patient Progress Review  OT/EDEMA 7C: Light gradient compression bandaging reapplied using only 25% torque (due to decreased sensation) from MTP to knee creases to promote fluid movement, wound approximation and skin integrity. Patient may wear wraps x24 hours, however, please remove if increased pain, numbness/tingling or soiling occurs.

## 2017-10-01 NOTE — PLAN OF CARE
Problem: Patient Care Overview  Goal: Plan of Care/Patient Progress Review  Outcome: No Change  Tachycardic, otherwise VSS on CPAP overnight. Breath sounds clear bilaterally, diminished in the bases. BLE and right heel dressings changed per plan of care at beginning of shift. Pain to LLE, declined pain interventions. Passing gas, no BM. Denies nausea. Triple lumen PICC heparin locked, blood return noted. . Refused repositioning q2h. Voiding spontaneously but frequently and urgently. Slept well between cares.

## 2017-10-01 NOTE — PROGRESS NOTES
Assist x 1-2. , AOVSS on 2L NC. CPAP overnight. BLE and right heal dressings changed. Pain to BLE, declined medication. Refusing repositioning q 2 hours.  Voiding frequently/urgency. Voiding adequate amounts.  and 105, no insulin given. Lymphedema wraps in place. Right heal elevated, floating. K+ needs to be replaced. 1500 ml fluid restriction, 720 ml so far today. PICC saline locked. Frustrated at times.  Support provided.

## 2017-10-01 NOTE — PLAN OF CARE
Problem: Patient Care Overview  Goal: Plan of Care/Patient Progress Review  Outcome: Therapy, progress towards functional goals is fair  HD 9 admitted with weakness and a fall at home. A&Ox4, Tachycardic 110-130, Hx of a-fibb, SAWANT on 2 LPM, NC, repositioned with lift, on a bariatric bed with continuous air movement. PICC has blood return on all lumens, BG check AC/HS, no insulin needed. BS active, LS clear but diminished, IS at 1750. BLE numbness, compression wraps on both legs, heels suspended. Was unable to perform wound care on both legs. Bruising in URE. Urinary urgency and frequency present, urinal offered frequently in bed. K+ 3.2, 40 meq given, 20 more needed. Plan is to DC tomorrow.

## 2017-10-01 NOTE — PLAN OF CARE
Problem: Patient Care Overview  Goal: Plan of Care/Patient Progress Review  PT 7C: Pt reports discomfort at abdomen lying in bed, improves with sitting upright and activity in standing. Pt participated in therapy well, WB activity limited most significantly d/t L lateral knee pain.     Discharge Planner PT   Patient plan for discharge: TCU  Current status: Pt rolled to sidelying with CGA and heavy use of bedrail. Pt tx sidelying>sit at EOB with mod A. Pt scooted forward at EOB with CGA-min A, tx to standing with FWW and min A x 2. Pt amb along EOB with min A x 2 and FWW.  Barriers to return to prior living situation: Pt requiring CGA-min Ax2 for bed mobility, STS, and amb.  Recommendations for discharge: TCU  Rationale for recommendations: Progress strength, stability and endurance for improved IND with functional mobility.       Entered by: Marta Seay 10/01/2017 4:11 PM

## 2017-10-02 ENCOUNTER — APPOINTMENT (OUTPATIENT)
Dept: PHYSICAL THERAPY | Facility: CLINIC | Age: 67
DRG: 291 | End: 2017-10-02
Payer: COMMERCIAL

## 2017-10-02 ENCOUNTER — APPOINTMENT (OUTPATIENT)
Dept: OCCUPATIONAL THERAPY | Facility: CLINIC | Age: 67
DRG: 291 | End: 2017-10-02
Payer: COMMERCIAL

## 2017-10-02 LAB
ANION GAP SERPL CALCULATED.3IONS-SCNC: 2 MMOL/L (ref 3–14)
BUN SERPL-MCNC: 7 MG/DL (ref 7–30)
CALCIUM SERPL-MCNC: 9 MG/DL (ref 8.5–10.1)
CHLORIDE SERPL-SCNC: 88 MMOL/L (ref 94–109)
CO2 SERPL-SCNC: 43 MMOL/L (ref 20–32)
CREAT SERPL-MCNC: 0.6 MG/DL (ref 0.66–1.25)
GFR SERPL CREATININE-BSD FRML MDRD: >90 ML/MIN/1.7M2
GLUCOSE BLDC GLUCOMTR-MCNC: 103 MG/DL (ref 70–99)
GLUCOSE BLDC GLUCOMTR-MCNC: 108 MG/DL (ref 70–99)
GLUCOSE BLDC GLUCOMTR-MCNC: 110 MG/DL (ref 70–99)
GLUCOSE BLDC GLUCOMTR-MCNC: 120 MG/DL (ref 70–99)
GLUCOSE BLDC GLUCOMTR-MCNC: 98 MG/DL (ref 70–99)
GLUCOSE SERPL-MCNC: 146 MG/DL (ref 70–99)
INR PPP: 1.87 (ref 0.86–1.14)
MAGNESIUM SERPL-MCNC: 2.1 MG/DL (ref 1.6–2.3)
PLATELET # BLD AUTO: 192 10E9/L (ref 150–450)
POTASSIUM SERPL-SCNC: 3.4 MMOL/L (ref 3.4–5.3)
POTASSIUM SERPL-SCNC: 3.7 MMOL/L (ref 3.4–5.3)
SODIUM SERPL-SCNC: 133 MMOL/L (ref 133–144)

## 2017-10-02 PROCEDURE — 99211 OFF/OP EST MAY X REQ PHY/QHP: CPT

## 2017-10-02 PROCEDURE — 25000132 ZZH RX MED GY IP 250 OP 250 PS 637

## 2017-10-02 PROCEDURE — 40000193 ZZH STATISTIC PT WARD VISIT

## 2017-10-02 PROCEDURE — 85049 AUTOMATED PLATELET COUNT: CPT | Performed by: HOSPITALIST

## 2017-10-02 PROCEDURE — 25000132 ZZH RX MED GY IP 250 OP 250 PS 637: Performed by: INTERNAL MEDICINE

## 2017-10-02 PROCEDURE — 80048 BASIC METABOLIC PNL TOTAL CA: CPT | Performed by: INTERNAL MEDICINE

## 2017-10-02 PROCEDURE — 25000128 H RX IP 250 OP 636: Performed by: INTERNAL MEDICINE

## 2017-10-02 PROCEDURE — 36592 COLLECT BLOOD FROM PICC: CPT | Performed by: INTERNAL MEDICINE

## 2017-10-02 PROCEDURE — 25000132 ZZH RX MED GY IP 250 OP 250 PS 637: Performed by: STUDENT IN AN ORGANIZED HEALTH CARE EDUCATION/TRAINING PROGRAM

## 2017-10-02 PROCEDURE — 40000275 ZZH STATISTIC RCP TIME EA 10 MIN

## 2017-10-02 PROCEDURE — 97140 MANUAL THERAPY 1/> REGIONS: CPT | Mod: GO | Performed by: OCCUPATIONAL THERAPIST

## 2017-10-02 PROCEDURE — 99233 SBSQ HOSP IP/OBS HIGH 50: CPT | Mod: GC | Performed by: INTERNAL MEDICINE

## 2017-10-02 PROCEDURE — 12000008 ZZH R&B INTERMEDIATE UMMC

## 2017-10-02 PROCEDURE — 83735 ASSAY OF MAGNESIUM: CPT | Performed by: INTERNAL MEDICINE

## 2017-10-02 PROCEDURE — 00000146 ZZHCL STATISTIC GLUCOSE BY METER IP

## 2017-10-02 PROCEDURE — 94660 CPAP INITIATION&MGMT: CPT

## 2017-10-02 PROCEDURE — 40000133 ZZH STATISTIC OT WARD VISIT: Performed by: OCCUPATIONAL THERAPIST

## 2017-10-02 PROCEDURE — 97530 THERAPEUTIC ACTIVITIES: CPT | Mod: GP

## 2017-10-02 PROCEDURE — 25000132 ZZH RX MED GY IP 250 OP 250 PS 637: Performed by: HOSPITALIST

## 2017-10-02 PROCEDURE — 85610 PROTHROMBIN TIME: CPT | Performed by: INTERNAL MEDICINE

## 2017-10-02 PROCEDURE — 40000558 ZZH STATISTIC CVC DRESSING CHANGE

## 2017-10-02 RX ORDER — POTASSIUM CHLORIDE 1.5 G/1.58G
40 POWDER, FOR SOLUTION ORAL 2 TIMES DAILY
Status: DISCONTINUED | OUTPATIENT
Start: 2017-10-02 | End: 2017-10-08 | Stop reason: HOSPADM

## 2017-10-02 RX ADMIN — SODIUM CHLORIDE, PRESERVATIVE FREE 10 ML: 5 INJECTION INTRAVENOUS at 20:50

## 2017-10-02 RX ADMIN — SENNOSIDES AND DOCUSATE SODIUM 2 TABLET: 8.6; 5 TABLET ORAL at 08:07

## 2017-10-02 RX ADMIN — Medication 13 ML: at 20:50

## 2017-10-02 RX ADMIN — ATORVASTATIN CALCIUM 40 MG: 40 TABLET, FILM COATED ORAL at 08:02

## 2017-10-02 RX ADMIN — MULTIPLE VITAMINS W/ MINERALS TAB 1 TABLET: TAB at 08:03

## 2017-10-02 RX ADMIN — CEPHALEXIN 500 MG: 500 CAPSULE ORAL at 13:07

## 2017-10-02 RX ADMIN — WARFARIN SODIUM 7 MG: 6 TABLET ORAL at 18:12

## 2017-10-02 RX ADMIN — ASPIRIN 81 MG CHEWABLE TABLET 81 MG: 81 TABLET CHEWABLE at 08:02

## 2017-10-02 RX ADMIN — POTASSIUM CHLORIDE 40 MEQ: 1.5 POWDER, FOR SOLUTION ORAL at 22:32

## 2017-10-02 RX ADMIN — Medication 13 ML: at 13:07

## 2017-10-02 RX ADMIN — METOPROLOL SUCCINATE 125 MG: 25 TABLET, EXTENDED RELEASE ORAL at 08:07

## 2017-10-02 RX ADMIN — LISINOPRIL 10 MG: 10 TABLET ORAL at 08:07

## 2017-10-02 RX ADMIN — CEPHALEXIN 500 MG: 500 CAPSULE ORAL at 20:50

## 2017-10-02 RX ADMIN — Medication 1 G: at 08:08

## 2017-10-02 RX ADMIN — TAMSULOSIN HYDROCHLORIDE 0.4 MG: 0.4 CAPSULE ORAL at 08:08

## 2017-10-02 RX ADMIN — CEPHALEXIN 500 MG: 500 CAPSULE ORAL at 16:38

## 2017-10-02 RX ADMIN — CEPHALEXIN 500 MG: 500 CAPSULE ORAL at 08:03

## 2017-10-02 RX ADMIN — SENNOSIDES AND DOCUSATE SODIUM 2 TABLET: 8.6; 5 TABLET ORAL at 20:50

## 2017-10-02 RX ADMIN — POLYETHYLENE GLYCOL 3350 17 G: 17 POWDER, FOR SOLUTION ORAL at 08:05

## 2017-10-02 RX ADMIN — LEVOTHYROXINE SODIUM 150 MCG: 25 TABLET ORAL at 08:04

## 2017-10-02 RX ADMIN — FUROSEMIDE 40 MG: 20 TABLET ORAL at 08:06

## 2017-10-02 NOTE — PLAN OF CARE
Problem: Patient Care Overview  Goal: Plan of Care/Patient Progress Review  Edema 7C: Recommend continued use of gentle compression to B LEs for edema management and to promote wound healing. RN performed wound cares/dressing change to B LEs. Skin remains red/dry with firm 2+ pitting. Reapplication of light GCB from MTPs to knee creases. Remove if causing pain/numbness or becomes soiled. Will continue to change edema wraps daily in coordination with RN for wound cares.

## 2017-10-02 NOTE — PLAN OF CARE
Problem: Patient Care Overview  Goal: Plan of Care/Patient Progress Review  Outcome: Therapy, progress towards functional goals is fair  Tachycardic, otherwise VSS on CPAP overnight. Pt refused second BLE dressing change d/t desiring lymphedema wraps to remain in place. Pain to LLE worse than RLE, declined pain medications when offered. +bowel sounds, passing flatus. Denies nausea, tolerating diabetic diet. Triple lumen PICC heparin locked. Potassium replaced, recheck 3.8.  and 110, no sliding scale insulin needed. Refused repositioning q2h, not out of bed. Voiding spontaneously using urinal. Per report, up with assist x2-3 and gait belt. Possible discharge to TCU today.

## 2017-10-02 NOTE — PROGRESS NOTES
CLINICAL NUTRITION SERVICES - REASSESSMENT NOTE     Nutrition Prescription    RECOMMENDATIONS FOR MDs/PROVIDERS TO ORDER:  None at this time.    Malnutrition Status:    Patient does not meet two of the above criteria necessary for diagnosing malnutrition.    Recommendations already ordered by Registered Dietitian (RD):  None at this time.    Future/Additional Recommendations:  - If pt PO intake decreases, order oral nutrition supplements and calorie counts to monitor intake.     EVALUATION OF THE PROGRESS TOWARD GOALS   Diet: Moderate Consistent CHO diet  Intake: 100% of meal today with poor appetite per flowsheet documentation. 9/29: 100% 2 meals with good appetite, 9/28: 100% 4 meals/snacks w/good appetite, 9/27: 100% 1 meal good appetite. Per pt, he is trying to eat more protein and food choices over the past few days have included egg omelets w/mushrooms and chicken salad wraps.      NEW FINDINGS   GI: No BM recorded in I/Os since admission. Pt on bowel regimen meds and eating regular diet. Per pt, no nausea or bloating.  Meds: Tri-Vi-Sol 13 mL BID x 10 days for wound healing, Thera-Vit-M MVI  Weight: wt loss likely r/t fluid losses given pt on furosemide. 20 lb (4%) wt loss in past week.  09/29/17  196 kg (432 lb 1.6 oz)   09/28/17  197 kg (434 lb 4.9 oz)   09/27/17  200 kg (440 lb 14.7 oz)   09/26/17  197.8 kg (436 lb)   09/24/17  201.8 kg (444 lb 14.2 oz)   09/23/17  209 kg (460 lb 11.2 oz)   09/23/17  205.2 kg (452 lb 6.1 oz)     MALNUTRITION  % Intake: Decreased intake does not meet criteria  % Weight Loss: > 2% in 1 week (severe) - likely fluid losses per chart review and pt on diuretics   Subcutaneous Fat Loss: None observed  Muscle Loss: None observed  Fluid Accumulation/Edema: None noted per adult shift assessment documentation  Malnutrition Diagnosis: Patient does not meet two of the above criteria necessary for diagnosing malnutrition.    Previous Goals   Diet adv v nutrition support within 2-3  days.  Evaluation: Met    Previous Nutrition Diagnosis  Inadequate protein-energy intake related to respiratory distress as evidenced by day 2 NPO on BiPAP  Evaluation: Improving    CURRENT NUTRITION DIAGNOSIS  Inadequate oral intake related to fluctuating diet orders related to respiratory distress as evidenced by pt NPO 2x since admission and pt eating ~1-2 meals per day over past week.      INTERVENTIONS  Implementation  Nutrition Education: Discussed previous diet education with pt (see previous RD notes) and pt very appreciative of education. Encouraged pt to see RD in outpatient setting if he has additional questions or concerns.     Goals  Patient to consume % of nutritionally adequate meal trays TID, or the equivalent with supplements/snacks.    Monitoring/Evaluation  Progress toward goals will be monitored and evaluated per protocol.    Pamlea Euceda RD, LD  Unit 7C pgr: [phone]

## 2017-10-02 NOTE — PROGRESS NOTES
Social Work Services Progress Note    Hospital Day: 10  Date of Initial Social Work Evaluation:  9/23/17  Collaborated with:  Pt,  TCU/ARU (Fang)    Data:  Pt is 68 y/o male admitted to [facility] on 9/23/17. Pt is in need of placement at DC.    Intervention:  MERVAT met with Pt today in his room to discuss DC planning. Pt has been accepted to  TCU when bed is available; however, no beds available today. Per medical team, Pt is medically stable for DC when placement is found.     MERVAT received call from Fang at  [facility] today discussing potential for ARU placement instead of TCU. SW discussed this option with Pt and he was understanding and agreeable. He acknowledged that it requires 3hrs/day of therapy and he said he was motivated to complete this. Pt talked about his long term plan to return home and that he is agreeable to increasing services when that time comes. Fang at [facility] is coordinating with inpt PT/OT to determine if they feel Pt would benefit from ARU placement instead.    Pt has a filled out HCD. Per his request, SW requested a notary to complete the form today.    Assessment:  TCU vs. ARU    Plan:    Anticipated Disposition:  Facility:  [place] [facility] vs. ARU    Barriers to d/c plan:  Bed availability, safe DC plan    Follow Up:  SW to continue to follow and assist with DC plan    AARON Self, CHESTERSW   Surgical Oncology Unit   [phone]  Pager: [phone]

## 2017-10-02 NOTE — PLAN OF CARE
Problem: Patient Care Overview  Goal: Plan of Care/Patient Progress Review  Outcome: Therapy, progress toward functional goals is gradual  HD#10, A&Ox4, VSS on 3 LPM NC, denies pain, on a mod carb diet, carb counts, Glucose checks AC/HS, no insulin given. UW2 to the commode, no results, voiding in the urinal. Bath given, brushed teeth. Triple lumen PICC is hep locked. BS hypoactive, LS diminished, IS at 2000. Blanchable redness on back and buttocks. Passing gas. Discharge pending placement at [facility].

## 2017-10-02 NOTE — PROGRESS NOTES
INTERNAL MEDICINE PROGRESS NOTE    Clarke Moreira ([id number]) admitted on 9/23/2017 09/30/2017    Assessment & Plan:  Clarke Sotelo is a 67 year old male with a history notable for morbid obesity, atrial fibrillation, HFrEF, diabetes, PVD, and untreated depression that was admitted following a fall and CP/SOB on 9/23.  Transferred to MICU on 9/24 for AMS with acute hypoxic/hypercarbic respiratory failure and hypotension requiring initiation of BiPAP and pressors.  Much improved following nasal airway and BiPAP, off pressors and transferred to general medicine for further management.     #Acute on Chronic Hypercapnic Respiratory Failure  #Evidence of Pulmonary Hypertension  #Probable Obesity hypoventilation syndrome  Developed AMS, A fib with RVR to 150-160s and respiratory distress early AM 9/24/17. Sudden decompensated respiratory status could have been secondary to flash pulmonary edema due to a-fib with RVR, exacerbation of an obstructive or restrictive lung pathology, infectious causes (although infectious respiratory panel negative) and worsening of obesity hypoventilation syndrome. ABGs demonstrated pH 7.14, CO2 113. After scheduled CPAP, he has improved in mental status and no AMS events. However, when off CPAP for prolonged time he re accumulates CO2 and becomes lethargic. It has been stressed to put on CPAP at 10pm as the latest and patient has been compliant. Additionally, echo on this admission showed mild reduction of RV function that could be indicative of Pulmonary HTN in the setting of chronic obstructive or restrictive lung disease with OHS, he has  BMI of 62.   - Continue CPAP at night, needs to be on at the latest 2200  - Goal O2 to keep saturations > 88%  - CPAP in the evening with VBG check at midnight  - Started DuoNebs QID  - PT ordered as pt sedentary   - Continue with lasix as below  - Incentive spirometry   - Will need PFTs and sleep study as outpatient    #Atrial fibrillation with  RVR  History of A fib in the past, not anticoagulated at home but on metoprolol for rate control. CHADS-VaSC 5. Received loading dose of amiodarone and started on amiodarone load and 6hr drip on arrival to MICU when in afib with RVR and hypotension. However, his atrial fibrillation improved once respiratory status was stabilized. Rate increased last night and persistently in the 150s-160s.  Heart rates elevates with minimal activity and therefore needs to continue rehab.   - Continue metoprolol to 150mg PO XL  - Continue warfarin    #Hypercapnic Encephalopathy - improving  Acute mental status change early AM 9/24 felt to be secondary to acute hypercarbic respiratory failure and acute respiratory acidosis. Mental status has improved once pCO2 in the 60s with nighttime CPAP.  - Monitor mental status  - VBG if mental status change  - CO2 goal of ~60s  - CPAP every night @ 2200       #History of HFrEF   #Pulmonary Edema  #CHF exacerbation  History of coronary artery disease/peripheral vascular disease per patient on admission. Last Echo 2008 with EF 40-45%, but on 09/24/17 recheck was described as normal. There might be a diastolic component and most suitable be HFpEF. He is on lisinopril, HCTZ, Metoprolol at home. During respiratory decompensation he did have CXR showing pulmonary edema. Which could secondary to HF exacerbation in the setting of atrial fibrillation. RV mildly reduced, IVC 3.34 without respiratory variability.  CXR from 09/26/17 with improving vascular congestion. Has lost ~20-25 lbs of water weight.  - Taking oral lasix 40mg daily  - To double dose as outpatient depending on weight change and dietary intake      #Peripheral Vascular Disease  #Venous Stasis   Patient reported worsening numbness/loss of sensation in his feet bilaterally over the last week, acutely worsening since admission. No signs of limb ischemia, but US did reveal anterior tibial artery and dorsalis pedis occlusion. Sensation might  "be an issue of diabetic neuropathy as well.  - Continue with right leg elevation  - VICK with toe pressures as outpatient, lymphedema wraps  - Monitor for signs of acute leg ischemia    #Depression  History of Depression and PTSD with limited social support. Declined therapy and in hospital chaplaincy services. No antidepressants at home. Ongoing concern for recent DNR/DNI code status change during this hospitalization with expressions of passive suicidality - wanting to avoid BiPap even though he understands it is a life sustaining treatment. Reports - \"the life that I live is not a life worth living.\" Psychiatry evaluated patient and did not believe depression was factoring on DNI/DNR and possible comfort care decision. Palliative care is involved and at this moment patient is agreeable to continue management and optimization of respiratory status as it is improving, but is still considering discontinuing BiPAP.   - Continue address goals of care with patient  - Palliative care following    #Lower Extremity Cellulitis - improving  Bilateral lower extremity with stasis dermatitis and ulcers. Area with some tenderness. Erythema and warmth has improve.  Started on cephalosporin on 09/23/17.   - WOC and lymphedema following  - Transition to cephalexin on 09/27/17 if continues with improving respiratory status and not requiring continuous BiPAP  - Complete 10 day course on 10/02/17    #Perisplenic Free Fluid  Noted on CT Chest in the ED 9/24, splenic laceration and perisplenic fluid collection following fall. Hgb has remained stable, no increase in abdominal distension, no abdominal pain. Per surgery, as hemoglobin stable for 24 hours post-fall, can restart anticoagulation.  - Monitor, daily abdominal exams, no tenderness or signs of acute abdomen  - Daily CBC  - Low threshold to reimage if concern for splenic hemorrhage   - General surgery consulted, appreciate recs as above  - Started patient on " warfarin    #Dyspnea  #Atypical Chest Pain, improving  Developed left sided chest pain and shortness of breath after fall from chair. History of CAD/PVD per patient, not documented in EMR. Troponins x3 negative, D-dimer elevated but CT PE study negative for embolism. Pain not improved with nitroglycerin. CK negative, rhabdo unlikely (normal renal function, potassium). Attributable to musculoskeletal at this point with dyspnea likely from HF exacerbation in setting of dilated IVC without variability with respirations.  - Cardio respiratory monitoring  - Continue PTA lipitor   - Continue ASA 81mg q day  - daily diuresis 40 mg lasix.  - Metoprolol as above       #Hypertension   On lisinopril at home given history of hypertension and DMII. Creatinine normal on admission. Now with improved hypotension.  - Continue lisinopril 10 mg q day as not on pressors      #Polycythemia   Hgb 17.2 on admission, peaked at 18.6. Could be secondary to hemoconcentration. Now normalized.       #DMII  On Metformin at home. HgbA1c 6.5 on 8/14/17.  - Low dose SSI      #Hypothyroidism  TSH on admission 3.11, on Synthroid 150 mcg at home  - Continue synthroid 150 mcg       #Hyponatremia, resolved.  - Will continue to monitor sodium as rate of correction appropriate in the past 48 hours.  - Will continue with diuresis  - Daily CMP  - Continue to follow, expect to improve with diuresis     #Hypophosphatemia  Could be secondary to increase respiratory effort.   - Replace PO4  - Monitor improvement      FEN: CHO consistent  Prophylaxis: DVT warfarin  Disposition: Will need optimization of respiratory status and discharge to TCU  Code Status: DNR/DNI    Family: POC is Janice Hillman [phone].    Roberto Alexander MD PhD  Internal Medicine PGY2  [phone]    Patient was seen and discussed with  who agrees with above assessment and plan.    ==================================================================    Interval HistorySubjective:  Good  "mood, cooperative. Denies fever, chills, night sweats, headache, shortness of breath, chest pain, nausea, vomiting, diarrhea, constipation.     Objective:  Most recent vital signs:  /70 (BP Location: Left arm)  Pulse 106  Temp 98.1  F (36.7  C) (Oral)  Resp 16  Ht 1.803 m (5' 11\")  Wt (!) 196 kg (432 lb 1.6 oz)  SpO2 94%  BMI 60.27 kg/m2  Temp:  [97.6  F (36.4  C)-98.1  F (36.7  C)] 98.1  F (36.7  C)  Pulse:  [106] 106  Heart Rate:  [101-124] 104  Resp:  [15-20] 16  BP: (103-140)/(64-86) 109/70  FiO2 (%):  [30 %] 30 %  SpO2:  [94 %-100 %] 94 %  Wt Readings from Last 2 Encounters:   09/29/17 (!) 196 kg (432 lb 1.6 oz)   08/14/17 (!) 191 kg (421 lb)       Intake/Output Summary (Last 24 hours) at 09/26/17 1426  Last data filed at 09/26/17 1400   Gross per 24 hour   Intake             1326 ml   Output             1580 ml   Net             -254 ml       Physical exam:  General: Patient lying comfortably in bed, NAD, wearing BiPAP mask  HEENT: EOMI, PERRL, no scleral icterus or injection, no bruits appreciated, difficult to appreciate JVD due to body habitus, MMM  Cardiac: RRR, no m/r/g appreciated.   Respiratory: reduced lung sounds bilaterally across all fields, no wheezing or crackles  GI: Obese, NT, no organomegaly, no signs of acute abdomen  Extremities: +2 edema bilaterally, absent pulses in right dorsalis pedis, lymphedema wraps on   Neuro: AOx3,  Moving all extremities sponteneously    Labs:  Results for orders placed or performed during the hospital encounter of 09/23/17 (from the past 24 hour(s))   Glucose by meter   Result Value Ref Range    Glucose 125 (H) 70 - 99 mg/dL   Basic metabolic panel   Result Value Ref Range    Sodium 135 133 - 144 mmol/L    Potassium 3.6 3.4 - 5.3 mmol/L    Chloride 88 (L) 94 - 109 mmol/L    Carbon Dioxide 42 (H) 20 - 32 mmol/L    Anion Gap 4 3 - 14 mmol/L    Glucose 107 (H) 70 - 99 mg/dL    Urea Nitrogen 8 7 - 30 mg/dL    Creatinine 0.61 (L) 0.66 - 1.25 mg/dL    GFR " Estimate >90 >60 mL/min/1.7m2    GFR Estimate If Black >90 >60 mL/min/1.7m2    Calcium 8.3 (L) 8.5 - 10.1 mg/dL   Glucose by meter   Result Value Ref Range    Glucose 133 (H) 70 - 99 mg/dL   Glucose by meter   Result Value Ref Range    Glucose 118 (H) 70 - 99 mg/dL   Basic metabolic panel   Result Value Ref Range    Sodium 136 133 - 144 mmol/L    Potassium 3.5 3.4 - 5.3 mmol/L    Chloride 89 (L) 94 - 109 mmol/L    Carbon Dioxide 42 (H) 20 - 32 mmol/L    Anion Gap 5 3 - 14 mmol/L    Glucose 102 (H) 70 - 99 mg/dL    Urea Nitrogen 7 7 - 30 mg/dL    Creatinine 0.63 (L) 0.66 - 1.25 mg/dL    GFR Estimate >90 >60 mL/min/1.7m2    GFR Estimate If Black >90 >60 mL/min/1.7m2    Calcium 8.5 8.5 - 10.1 mg/dL   CBC with platelets   Result Value Ref Range    WBC 7.1 4.0 - 11.0 10e9/L    RBC Count 5.16 4.4 - 5.9 10e12/L    Hemoglobin 15.8 13.3 - 17.7 g/dL    Hematocrit 50.4 40.0 - 53.0 %    MCV 98 78 - 100 fl    MCH 30.6 26.5 - 33.0 pg    MCHC 31.3 (L) 31.5 - 36.5 g/dL    RDW 14.0 10.0 - 15.0 %    Platelet Count 159 150 - 450 10e9/L   INR   Result Value Ref Range    INR 2.04 (H) 0.86 - 1.14   Magnesium   Result Value Ref Range    Magnesium 2.0 1.6 - 2.3 mg/dL   Blood gas venous   Result Value Ref Range    Ph Venous 7.40 7.32 - 7.43 pH    PCO2 Venous 79 (HH) 40 - 50 mm Hg    PO2 Venous 35 25 - 47 mm Hg    Bicarbonate Venous 49 (HH) 21 - 28 mmol/L    Base Excess Venous 18.2 mmol/L    FIO2 30.0    Glucose by meter   Result Value Ref Range    Glucose 98 70 - 99 mg/dL   Glucose by meter   Result Value Ref Range    Glucose 107 (H) 70 - 99 mg/dL     Internal Medicine Staff Addendum  Date of Service: 10/2/2017  I have seen and examined Mr Moreira, reviewed the data and discussed the plan of care with the care team on rounds.  I agree with the above documentation with the additions/changes to the ROS, HPI, Exam or data (including my edits in italics):    I discussed pt's care with bedside RN, case management/social work today.  I  personally reviewed, labs, medications and past 24 hr notes.  Assessment/Plan/Diagnoses: plan/dx as above, which contains my edits and reflects our joint medical decision-making.     Clarke Manzo MD PhD  Internal Medicine Hospitalist & Staff Physician   of Internal Medicine   [facility]  Pager: [phone]

## 2017-10-02 NOTE — PROGRESS NOTES
[facility] Nurse Inpatient Wound Assessment    Follow up Assessment  Reason for consultation: Evaluate and treat Right heel wound  Pt has open wounds on bilateral LE.  Seen by vascular, orders in place for these wounds.  Pt declined assessment by this writer as they had just been assessed.        ASSESSMENT:   Right heel wound due to Pressure Injury   Unstageable pressure injury, present on admit  Status: improving    TREATMENT PLAN:     Right heel (per vascular surgery): Medihoney (#[id number]) to right heel, covered by mepilex change every other day or as needed for excess drainage.    Orders Written  WO Nurse follow-up plan: weekly  Nursing to notify the Provider(s) and re-consult the WO Nurse if wound(s) deteriorates or new skin concern.    Patient History  According to provider note(s):     Objective Data   Containment of urine/stool: Continent of bowel and Indwelling catheter    Active Diet Order    Active Diet Order      Moderate Consistent CHO Diet    Output:  I/O last 3 completed shifts:  In: 1370 [P.O.:1280; I.V.:90]  Out: 1580 [Urine:1580]    Risk Assessment:   Peterson Peterson Score  Av.9  Min: 13  Max: 17                                 Labs:     Recent Labs  Lab 10/01/17  0852 17  0356   HGB  --  15.8   INR 1.88* 2.04*   WBC  --  7.1     No lab results found in last 7 days.        PHYSICAL EXAM:   Skin assessment: Right heel    Wound Location: Right heel  Date of last photo: 17    Wound History: Pt unable to give history.  Legs are painful with movement.  Location of wound suggests pressure due to immobility.    Measurements (length x width x depth, in cm) 3 x 1.3 x 0.2 cm  Wound Base:  Thin yellow layer of slough with smooth red rim, improving  Palpation of the wound bed: normal   Periwound skin: intact and dry/scaly  Color: pink  Temperature: normal   Drainage: small  Description of drainage: serosanguinous  Odor: none  Pain: denies , (pts legs are painful, but not the heel    Nose: while  assessing heel bipap off and noted bright red skin under mask.  Area blanched easily, not a pressure injury at this time.  Mask is tight due to pts beard impeding a good seal.  RT placed gecko pad under mask to prevent pressure injury.  10/2 no injury to nose, Bipap no longer in use.       INTERVENTIONS:   Current support surface: Bariatric Big turn mattress  Current off-loading measures: Heel off-loading boot(s) and Foam padding  Visual inspection of wounds(s) completed.  Wound Care: was done per plan of care.  Supplies: medihoney, xeroform guaze (for legs, ordered per vascular)  Education provided today: RN. pt    Discussed plan of care with Patient and Nurse  Face to face time: 10 minutes

## 2017-10-02 NOTE — PLAN OF CARE
Problem: Patient Care Overview  Goal: Plan of Care/Patient Progress Review  Discharge Planner PT   Patient plan for discharge: TCU; pt's stated ultimate goal is to return home  Current status: Pt requires Mod A x 2 to complete sit <> stand transfer; requires Min A 1-2 and FWW to ambulate x 10 steps in room. Pt limited by increased LLE pain in standing, fatigue and decreased strength.  Barriers to return to prior living situation: increased level of assist required to complete functional transfers and bed mobility  Recommendations for discharge: TCU   Rationale for recommendations: Pt limited by fatigue, decreased strength and functional mobility. Pt becomes very fatigued in session following 1 sit <> stand transfer and ambulation short distance in room. Pt does present with multiple needs from both PT and OT services and pending improved activity tolerance while IP, pt may be good candidate for ARU upon discharge as pt's ultimate goal is to return home.       Entered by: Luis Kilpatrick 10/02/2017 4:24 PM

## 2017-10-02 NOTE — PROGRESS NOTES
10/01/17 1920   Wound 09/28/17 Bilateral;Lower Leg Other (comment)   Date First Assessed/Time First Assessed: (c) 09/28/17 1200   Orientation: Bilateral;Lower  Location: Leg  Wound Type: (c) Other (comment)  Pre-existing: Yes   Site Assessment UTV   Yolanda-wound Assessment UTV   Drainage Amount UTV   Drainage Color/Charcteristics UTV   Dressing Per Plan of Care   Dressing Status Other (Comment)  (GABRIEL, lymphedema wraps in place)   Pt refused second dressing change of the day. Pt would prefer to keep lymphedema wraps in place.

## 2017-10-03 ENCOUNTER — APPOINTMENT (OUTPATIENT)
Dept: OCCUPATIONAL THERAPY | Facility: CLINIC | Age: 67
DRG: 291 | End: 2017-10-03
Payer: COMMERCIAL

## 2017-10-03 LAB
ANION GAP SERPL CALCULATED.3IONS-SCNC: 2 MMOL/L (ref 3–14)
ANION GAP SERPL CALCULATED.3IONS-SCNC: 3 MMOL/L (ref 3–14)
BUN SERPL-MCNC: 7 MG/DL (ref 7–30)
BUN SERPL-MCNC: 9 MG/DL (ref 7–30)
CALCIUM SERPL-MCNC: 8.5 MG/DL (ref 8.5–10.1)
CALCIUM SERPL-MCNC: 8.8 MG/DL (ref 8.5–10.1)
CHLORIDE SERPL-SCNC: 90 MMOL/L (ref 94–109)
CHLORIDE SERPL-SCNC: 94 MMOL/L (ref 94–109)
CO2 SERPL-SCNC: 40 MMOL/L (ref 20–32)
CO2 SERPL-SCNC: 43 MMOL/L (ref 20–32)
CREAT SERPL-MCNC: 0.61 MG/DL (ref 0.66–1.25)
CREAT SERPL-MCNC: 0.63 MG/DL (ref 0.66–1.25)
GFR SERPL CREATININE-BSD FRML MDRD: >90 ML/MIN/1.7M2
GFR SERPL CREATININE-BSD FRML MDRD: >90 ML/MIN/1.7M2
GLUCOSE BLDC GLUCOMTR-MCNC: 106 MG/DL (ref 70–99)
GLUCOSE BLDC GLUCOMTR-MCNC: 111 MG/DL (ref 70–99)
GLUCOSE BLDC GLUCOMTR-MCNC: 114 MG/DL (ref 70–99)
GLUCOSE BLDC GLUCOMTR-MCNC: 126 MG/DL (ref 70–99)
GLUCOSE SERPL-MCNC: 132 MG/DL (ref 70–99)
GLUCOSE SERPL-MCNC: 95 MG/DL (ref 70–99)
INR PPP: 1.9 (ref 0.86–1.14)
LACTATE BLD-SCNC: 1 MMOL/L (ref 0.7–2)
MAGNESIUM SERPL-MCNC: 1.8 MG/DL (ref 1.6–2.3)
POTASSIUM SERPL-SCNC: 3.5 MMOL/L (ref 3.4–5.3)
POTASSIUM SERPL-SCNC: 3.6 MMOL/L (ref 3.4–5.3)
SODIUM SERPL-SCNC: 135 MMOL/L (ref 133–144)
SODIUM SERPL-SCNC: 136 MMOL/L (ref 133–144)

## 2017-10-03 PROCEDURE — 25000128 H RX IP 250 OP 636: Performed by: STUDENT IN AN ORGANIZED HEALTH CARE EDUCATION/TRAINING PROGRAM

## 2017-10-03 PROCEDURE — 94660 CPAP INITIATION&MGMT: CPT

## 2017-10-03 PROCEDURE — 25000132 ZZH RX MED GY IP 250 OP 250 PS 637: Performed by: INTERNAL MEDICINE

## 2017-10-03 PROCEDURE — 00000146 ZZHCL STATISTIC GLUCOSE BY METER IP

## 2017-10-03 PROCEDURE — 83605 ASSAY OF LACTIC ACID: CPT | Performed by: INTERNAL MEDICINE

## 2017-10-03 PROCEDURE — 36592 COLLECT BLOOD FROM PICC: CPT | Performed by: INTERNAL MEDICINE

## 2017-10-03 PROCEDURE — 97140 MANUAL THERAPY 1/> REGIONS: CPT | Mod: GO

## 2017-10-03 PROCEDURE — 83735 ASSAY OF MAGNESIUM: CPT | Performed by: STUDENT IN AN ORGANIZED HEALTH CARE EDUCATION/TRAINING PROGRAM

## 2017-10-03 PROCEDURE — 25000128 H RX IP 250 OP 636: Performed by: INTERNAL MEDICINE

## 2017-10-03 PROCEDURE — 25000132 ZZH RX MED GY IP 250 OP 250 PS 637: Performed by: HOSPITALIST

## 2017-10-03 PROCEDURE — 25000132 ZZH RX MED GY IP 250 OP 250 PS 637: Performed by: STUDENT IN AN ORGANIZED HEALTH CARE EDUCATION/TRAINING PROGRAM

## 2017-10-03 PROCEDURE — 40000275 ZZH STATISTIC RCP TIME EA 10 MIN

## 2017-10-03 PROCEDURE — 80048 BASIC METABOLIC PNL TOTAL CA: CPT | Performed by: STUDENT IN AN ORGANIZED HEALTH CARE EDUCATION/TRAINING PROGRAM

## 2017-10-03 PROCEDURE — 12000006 ZZH R&B IMCU INTERMEDIATE UMMC

## 2017-10-03 PROCEDURE — 85610 PROTHROMBIN TIME: CPT | Performed by: INTERNAL MEDICINE

## 2017-10-03 PROCEDURE — 25000132 ZZH RX MED GY IP 250 OP 250 PS 637

## 2017-10-03 PROCEDURE — 36592 COLLECT BLOOD FROM PICC: CPT | Performed by: STUDENT IN AN ORGANIZED HEALTH CARE EDUCATION/TRAINING PROGRAM

## 2017-10-03 PROCEDURE — 40000802 ZZH SITE CHECK

## 2017-10-03 PROCEDURE — 99233 SBSQ HOSP IP/OBS HIGH 50: CPT | Mod: GC | Performed by: INTERNAL MEDICINE

## 2017-10-03 PROCEDURE — 36415 COLL VENOUS BLD VENIPUNCTURE: CPT | Performed by: INTERNAL MEDICINE

## 2017-10-03 PROCEDURE — 40000133 ZZH STATISTIC OT WARD VISIT

## 2017-10-03 RX ORDER — MAGNESIUM OXIDE 400 MG/1
400 TABLET ORAL ONCE
Status: COMPLETED | OUTPATIENT
Start: 2017-10-03 | End: 2017-10-04

## 2017-10-03 RX ORDER — POTASSIUM CHLORIDE 1.5 G/1.58G
40 POWDER, FOR SOLUTION ORAL ONCE
Status: COMPLETED | OUTPATIENT
Start: 2017-10-03 | End: 2017-10-04

## 2017-10-03 RX ORDER — CEPHALEXIN 500 MG/1
500 CAPSULE ORAL 4 TIMES DAILY
Status: COMPLETED | OUTPATIENT
Start: 2017-10-03 | End: 2017-10-03

## 2017-10-03 RX ORDER — METOPROLOL SUCCINATE 25 MG/1
50 TABLET, EXTENDED RELEASE ORAL ONCE
Status: COMPLETED | OUTPATIENT
Start: 2017-10-03 | End: 2017-10-03

## 2017-10-03 RX ORDER — METOPROLOL SUCCINATE 25 MG/1
25 TABLET, EXTENDED RELEASE ORAL ONCE
Status: COMPLETED | OUTPATIENT
Start: 2017-10-03 | End: 2017-10-03

## 2017-10-03 RX ORDER — WARFARIN SODIUM 7.5 MG/1
7.5 TABLET ORAL
Status: COMPLETED | OUTPATIENT
Start: 2017-10-03 | End: 2017-10-03

## 2017-10-03 RX ORDER — FUROSEMIDE 10 MG/ML
20 INJECTION INTRAMUSCULAR; INTRAVENOUS ONCE
Status: COMPLETED | OUTPATIENT
Start: 2017-10-03 | End: 2017-10-03

## 2017-10-03 RX ORDER — METOPROLOL SUCCINATE 25 MG/1
50 TABLET, EXTENDED RELEASE ORAL ONCE
Status: DISCONTINUED | OUTPATIENT
Start: 2017-10-03 | End: 2017-10-08 | Stop reason: HOSPADM

## 2017-10-03 RX ORDER — FUROSEMIDE 40 MG
80 TABLET ORAL DAILY
Status: DISCONTINUED | OUTPATIENT
Start: 2017-10-04 | End: 2017-10-08 | Stop reason: HOSPADM

## 2017-10-03 RX ORDER — METOPROLOL SUCCINATE 200 MG/1
200 TABLET, EXTENDED RELEASE ORAL DAILY
Status: DISCONTINUED | OUTPATIENT
Start: 2017-10-04 | End: 2017-10-08 | Stop reason: HOSPADM

## 2017-10-03 RX ADMIN — WARFARIN SODIUM 7.5 MG: 7.5 TABLET ORAL at 18:01

## 2017-10-03 RX ADMIN — Medication 1 G: at 07:35

## 2017-10-03 RX ADMIN — SODIUM CHLORIDE, PRESERVATIVE FREE 10 ML: 5 INJECTION INTRAVENOUS at 21:10

## 2017-10-03 RX ADMIN — CEPHALEXIN 500 MG: 500 CAPSULE ORAL at 07:35

## 2017-10-03 RX ADMIN — LISINOPRIL 10 MG: 10 TABLET ORAL at 07:35

## 2017-10-03 RX ADMIN — FUROSEMIDE 20 MG: 10 INJECTION, SOLUTION INTRAVENOUS at 15:34

## 2017-10-03 RX ADMIN — TAMSULOSIN HYDROCHLORIDE 0.4 MG: 0.4 CAPSULE ORAL at 07:35

## 2017-10-03 RX ADMIN — METOPROLOL SUCCINATE 125 MG: 25 TABLET, EXTENDED RELEASE ORAL at 07:34

## 2017-10-03 RX ADMIN — SENNOSIDES AND DOCUSATE SODIUM 2 TABLET: 8.6; 5 TABLET ORAL at 07:35

## 2017-10-03 RX ADMIN — Medication 13 ML: at 21:09

## 2017-10-03 RX ADMIN — POTASSIUM CHLORIDE 40 MEQ: 1.5 POWDER, FOR SOLUTION ORAL at 07:43

## 2017-10-03 RX ADMIN — SODIUM CHLORIDE, PRESERVATIVE FREE 5 ML: 5 INJECTION INTRAVENOUS at 03:47

## 2017-10-03 RX ADMIN — MULTIPLE VITAMINS W/ MINERALS TAB 1 TABLET: TAB at 07:35

## 2017-10-03 RX ADMIN — METOPROLOL SUCCINATE 25 MG: 25 TABLET, EXTENDED RELEASE ORAL at 09:38

## 2017-10-03 RX ADMIN — METOPROLOL SUCCINATE 50 MG: 25 TABLET, EXTENDED RELEASE ORAL at 17:51

## 2017-10-03 RX ADMIN — ASPIRIN 81 MG CHEWABLE TABLET 81 MG: 81 TABLET CHEWABLE at 07:34

## 2017-10-03 RX ADMIN — LEVOTHYROXINE SODIUM 150 MCG: 25 TABLET ORAL at 07:35

## 2017-10-03 RX ADMIN — CEPHALEXIN 500 MG: 500 CAPSULE ORAL at 21:08

## 2017-10-03 RX ADMIN — ALTEPLASE 1 MG: 2.2 INJECTION, POWDER, LYOPHILIZED, FOR SOLUTION INTRAVENOUS at 09:39

## 2017-10-03 RX ADMIN — ALTEPLASE 1 MG: 2.2 INJECTION, POWDER, LYOPHILIZED, FOR SOLUTION INTRAVENOUS at 03:07

## 2017-10-03 RX ADMIN — ATORVASTATIN CALCIUM 40 MG: 40 TABLET, FILM COATED ORAL at 07:35

## 2017-10-03 RX ADMIN — Medication 13 ML: at 09:38

## 2017-10-03 RX ADMIN — FUROSEMIDE 40 MG: 20 TABLET ORAL at 07:34

## 2017-10-03 RX ADMIN — POTASSIUM CHLORIDE 40 MEQ: 1.5 POWDER, FOR SOLUTION ORAL at 21:09

## 2017-10-03 RX ADMIN — CEPHALEXIN 500 MG: 500 CAPSULE ORAL at 15:34

## 2017-10-03 RX ADMIN — CEPHALEXIN 500 MG: 500 CAPSULE ORAL at 12:55

## 2017-10-03 ASSESSMENT — PAIN DESCRIPTION - DESCRIPTORS: DESCRIPTORS: SHOOTING;SHARP

## 2017-10-03 NOTE — PLAN OF CARE
Problem: Patient Care Overview  Goal: Plan of Care/Patient Progress Review  Outcome: No Change  Neuro: A&Ox4. Pleasant.   Cardiac: A-fib rate 115-150s (140s-150s with activity). VSS.             Respiratory: Sating 94% on 2L. CPAP at HS-d/t CO2 retention  GI/: Adequate urine output in urinal. No BM this shift.   Diet/appetite: Tolerating Mod CHO diet. W/ fluid restriction Eating well.  Activity:  Assist of 2-3, up to chair.   Pain: At acceptable level on current regimen. BLE pain, relieved with reposition.   Skin: Intact, no new deficits noted.-Drsgs to LE wounds changed this afternoon.   LDA's: R 3L PICC-SL.      Plan: Metoprolol XL increased to 200mg today. Lasix 20mg X1, good output. Denies dizziness this evening. Legs wrapped today. Encourage activity and OOB. Encourage CPAP use at [facility]. Continue with POC. Notify primary team with changes.

## 2017-10-03 NOTE — PLAN OF CARE
Problem: Patient Care Overview  Goal: Plan of Care/Patient Progress Review  OT 7C: cancel, pt transferred off of floor down to 6B upon PM attempt, will reschedule.

## 2017-10-03 NOTE — PROVIDER NOTIFICATION
#[id number] paged at 0240 re: a few episodes of increased HR to 130's/irregular/OVSS/asymptomatic.   Spoke with MD, no new orders received, will continue to monitor.    #[phone] paged at 0300 re: shooting R leg pain that resolved within 15 min, no new orders received, will continue to monitor.

## 2017-10-03 NOTE — PLAN OF CARE
Problem: Patient Care Overview  Goal: Plan of Care/Patient Progress Review  Edema 6B: Coordinated B LE GCB change with wound cares, completed by RN, to promote wound healing, fluid mobilization, and greater I/ease with ADLs and mobility. Plan for pt to wear GCB x 24 hours, but please remove if wraps cause numbness, tingling, pain, or become soiled.

## 2017-10-03 NOTE — PLAN OF CARE
Problem: Patient Care Overview  Goal: Plan of Care/Patient Progress Review  Outcome: Therapy, unable to show any progress toward functional goals  Patient safely transferred to 6B. Belongings, CPAP, and bariatric commode sent with patient. This RN gave report to the RN on 6B. Patient's HR continues to fluctuate up to the 140's. OVSS. Denies pain. Tolerating consistent carb diet. Denies nausea. Had BM this shift and passing gas. Voiding adequate amounts of urine using urinal. Up with heavy assist x2 and gait belt. Bilateral legs with lymphedema wraps on.

## 2017-10-03 NOTE — PROGRESS NOTES
Social Work Services Progress Note    Hospital Day: 11  Date of Initial Social Work Evaluation:  9/23/17  Collaborated with:  [facility] (Urban), [facility] (Candi)    Data:  Pt is 68 y/o male admitted to [facility] on 9/23/17. Pt is in need of placement at DC.     Intervention:  SW coordinated with RNLUZ MARIA Christopher today; per Candi, the medical team does not feel that Pt would be appropriate for ARU placement and would like him to DC to [facility] instead when a bed is available.    SW updated Urban at [facility]. He said that Pt is still on the list for [facility] when a bed becomes available, however, did ask how Pt would get a CPAP at DC home d/t needing an outpatient sleep study. SW to attempt to coordinate with IDT to determine how to get a CPAP.    Assessment:  Pt will DC to [facility] when bed is available.    Plan:    Anticipated Disposition:  Facility:  [facility]    Barriers to d/c plan:  Medical stability, bed availability, safe DC plan    Follow Up:  SW to continue to follow and assist with DC plan.    AARON Self, LGSW   Surgical Oncology Unit   [phone]  Pager: [phone]

## 2017-10-03 NOTE — PLAN OF CARE
Problem: Breathing Pattern Ineffective (Adult)  Intervention: Monitor/Manage Contributing Psychosocial Factors  VSS. Pt up with assist of 2. Pt sat in chair for 3 hours. Worked with PT on mobility. Pt had one large soft BM on commode. Voids spont with adequate UOP. PICC caps changed and hep locked. White lumen occluded. MD paged, no orders as of this writing. BG WNL, no insulin given. Tolerating CHO diet. Maintaining 1500cc fluid restriction. D/C to TCU tomorrow. Cont. POC.

## 2017-10-03 NOTE — PLAN OF CARE
Problem: Patient Care Overview  Goal: Plan of Care/Patient Progress Review  PT 7C: cancel - PT session cancelled, pt transfers to 6B for increased level of care. Will schedule forward per POC.

## 2017-10-03 NOTE — PLAN OF CARE
Problem: Patient Care Overview  Goal: Individualization & Mutuality  Outcome: Therapy, progress toward functional goals is gradual     Denies pain but had one short episode of shooting pain in R inner thigh from knee to groin, able to lift leg and bend knee, pain resolved, MD aware.  AVSS, afebrile, HR in 100-110 range and irregular with a few episodes where HR increased to 130s at rest, also increases with activity. MD aware. On CPAP during the night. Triggered SIRS, lactic acid 1.0.  Voiding good amts. No BM this shift, passing gas.   Bilat LEs wrapped.  1500cc fluid restriction, had approx 200cc overnight.   PICC- red lumen TPA'd, now with good blood return, hep locked currently. Other 2 lumens are unable to be flushed, TPA in frig.  Pt in good spirits.

## 2017-10-03 NOTE — PLAN OF CARE
"Problem: Patient Care Overview  Goal: Plan of Care/Patient Progress Review  Outcome: Improving  Neuro: A&Ox4.   Cardiac: A fib, , intermittent elevated HR.      Respiratory: Sating 99% on 2-3L NC.  GI/: No output since arrival on unit. Last BM 10/2/17  Diet/appetite: Tolerating carb controlled diet, fluid restriction of 1500ml/day. Eating well.  Activity:  Assist of 2-3, with repositioning and commode.  Pain: At acceptable level on current regimen.   Skin: Bilateral lower extremities edema and wound management, wound on left top of foot \"rubbing on sandal\" per pt, healing pressure wound on right heel.  LDA's: DL PICC SL.     Plan: Continue with POC. Notify primary team with changes. Will continue to monitor.  Temp:  [96.7  F (35.9  C)-98.8  F (37.1  C)] 98.8  F (37.1  C)  Pulse:  [110-152] 112  Heart Rate:  [] 131  Resp:  [14-21] 16  BP: (110-137)/(55-96) 136/96  SpO2:  [91 %-100 %] 100 %          "

## 2017-10-03 NOTE — PROGRESS NOTES
Transfer  Transferred from:  Via:bed  Reason for transfer:Pt appropriate for 6B- worsened patient condition  Family: Aware of transfer  Belongings: Received with pt  Chart: Received with pt  Medications: Meds received from old unit with pt  2 RN Skin Assessment Completed By: Rachel MCCALL RN and Olivia CORNELIUS RN  Report received from: Amanda HERNDON RN  Pt status: Pt is stable arrived on unit via bed, with telemetry monitor, 3L NC.     Temp:  [96.7  F (35.9  C)-98.8  F (37.1  C)] 98.8  F (37.1  C)  Pulse:  [110-152] 112  Heart Rate:  [] 131  Resp:  [14-21] 16  BP: (110-137)/(55-96) 136/96  SpO2:  [91 %-100 %] 100 % 3L NC

## 2017-10-04 ENCOUNTER — APPOINTMENT (OUTPATIENT)
Dept: PHYSICAL THERAPY | Facility: CLINIC | Age: 67
DRG: 291 | End: 2017-10-04
Payer: COMMERCIAL

## 2017-10-04 ENCOUNTER — APPOINTMENT (OUTPATIENT)
Dept: GENERAL RADIOLOGY | Facility: CLINIC | Age: 67
DRG: 291 | End: 2017-10-04
Payer: COMMERCIAL

## 2017-10-04 ENCOUNTER — APPOINTMENT (OUTPATIENT)
Dept: OCCUPATIONAL THERAPY | Facility: CLINIC | Age: 67
DRG: 291 | End: 2017-10-04
Payer: COMMERCIAL

## 2017-10-04 LAB
ANION GAP SERPL CALCULATED.3IONS-SCNC: 2 MMOL/L (ref 3–14)
ANION GAP SERPL CALCULATED.3IONS-SCNC: 4 MMOL/L (ref 3–14)
BUN SERPL-MCNC: 7 MG/DL (ref 7–30)
BUN SERPL-MCNC: 8 MG/DL (ref 7–30)
CALCIUM SERPL-MCNC: 8.6 MG/DL (ref 8.5–10.1)
CALCIUM SERPL-MCNC: 8.7 MG/DL (ref 8.5–10.1)
CHLORIDE SERPL-SCNC: 92 MMOL/L (ref 94–109)
CHLORIDE SERPL-SCNC: 94 MMOL/L (ref 94–109)
CO2 SERPL-SCNC: 40 MMOL/L (ref 20–32)
CO2 SERPL-SCNC: 41 MMOL/L (ref 20–32)
CREAT SERPL-MCNC: 0.61 MG/DL (ref 0.66–1.25)
CREAT SERPL-MCNC: 0.65 MG/DL (ref 0.66–1.25)
GFR SERPL CREATININE-BSD FRML MDRD: >90 ML/MIN/1.7M2
GFR SERPL CREATININE-BSD FRML MDRD: >90 ML/MIN/1.7M2
GLUCOSE BLDC GLUCOMTR-MCNC: 100 MG/DL (ref 70–99)
GLUCOSE BLDC GLUCOMTR-MCNC: 102 MG/DL (ref 70–99)
GLUCOSE BLDC GLUCOMTR-MCNC: 107 MG/DL (ref 70–99)
GLUCOSE BLDC GLUCOMTR-MCNC: 131 MG/DL (ref 70–99)
GLUCOSE SERPL-MCNC: 100 MG/DL (ref 70–99)
GLUCOSE SERPL-MCNC: 114 MG/DL (ref 70–99)
INR PPP: 1.95 (ref 0.86–1.14)
MAGNESIUM SERPL-MCNC: 2 MG/DL (ref 1.6–2.3)
MAGNESIUM SERPL-MCNC: 2.1 MG/DL (ref 1.6–2.3)
POTASSIUM SERPL-SCNC: 4.3 MMOL/L (ref 3.4–5.3)
POTASSIUM SERPL-SCNC: 4.3 MMOL/L (ref 3.4–5.3)
SODIUM SERPL-SCNC: 136 MMOL/L (ref 133–144)
SODIUM SERPL-SCNC: 138 MMOL/L (ref 133–144)

## 2017-10-04 PROCEDURE — 25000132 ZZH RX MED GY IP 250 OP 250 PS 637: Performed by: STUDENT IN AN ORGANIZED HEALTH CARE EDUCATION/TRAINING PROGRAM

## 2017-10-04 PROCEDURE — 40000809 ZZH STATISTIC NO DOCUMENTATION TO SUPPORT CHARGE

## 2017-10-04 PROCEDURE — 80048 BASIC METABOLIC PNL TOTAL CA: CPT | Performed by: STUDENT IN AN ORGANIZED HEALTH CARE EDUCATION/TRAINING PROGRAM

## 2017-10-04 PROCEDURE — 83735 ASSAY OF MAGNESIUM: CPT | Performed by: STUDENT IN AN ORGANIZED HEALTH CARE EDUCATION/TRAINING PROGRAM

## 2017-10-04 PROCEDURE — 40000193 ZZH STATISTIC PT WARD VISIT

## 2017-10-04 PROCEDURE — 97140 MANUAL THERAPY 1/> REGIONS: CPT | Mod: GO | Performed by: OCCUPATIONAL THERAPIST

## 2017-10-04 PROCEDURE — 94660 CPAP INITIATION&MGMT: CPT

## 2017-10-04 PROCEDURE — 97110 THERAPEUTIC EXERCISES: CPT | Mod: GP

## 2017-10-04 PROCEDURE — 71010 XR CHEST PORT 1 VW: CPT

## 2017-10-04 PROCEDURE — 99232 SBSQ HOSP IP/OBS MODERATE 35: CPT | Mod: GC | Performed by: INTERNAL MEDICINE

## 2017-10-04 PROCEDURE — 85610 PROTHROMBIN TIME: CPT | Performed by: INTERNAL MEDICINE

## 2017-10-04 PROCEDURE — 36592 COLLECT BLOOD FROM PICC: CPT | Performed by: INTERNAL MEDICINE

## 2017-10-04 PROCEDURE — 36592 COLLECT BLOOD FROM PICC: CPT | Performed by: STUDENT IN AN ORGANIZED HEALTH CARE EDUCATION/TRAINING PROGRAM

## 2017-10-04 PROCEDURE — 25000132 ZZH RX MED GY IP 250 OP 250 PS 637: Performed by: INTERNAL MEDICINE

## 2017-10-04 PROCEDURE — 97530 THERAPEUTIC ACTIVITIES: CPT | Mod: GO

## 2017-10-04 PROCEDURE — 12000006 ZZH R&B IMCU INTERMEDIATE UMMC

## 2017-10-04 PROCEDURE — 40000802 ZZH SITE CHECK

## 2017-10-04 PROCEDURE — 40000133 ZZH STATISTIC OT WARD VISIT

## 2017-10-04 PROCEDURE — 83735 ASSAY OF MAGNESIUM: CPT | Performed by: INTERNAL MEDICINE

## 2017-10-04 PROCEDURE — 40000133 ZZH STATISTIC OT WARD VISIT: Performed by: OCCUPATIONAL THERAPIST

## 2017-10-04 PROCEDURE — 00000146 ZZHCL STATISTIC GLUCOSE BY METER IP

## 2017-10-04 PROCEDURE — 25000132 ZZH RX MED GY IP 250 OP 250 PS 637: Performed by: HOSPITALIST

## 2017-10-04 PROCEDURE — 97530 THERAPEUTIC ACTIVITIES: CPT | Mod: GP

## 2017-10-04 PROCEDURE — 80048 BASIC METABOLIC PNL TOTAL CA: CPT | Performed by: INTERNAL MEDICINE

## 2017-10-04 PROCEDURE — 25000128 H RX IP 250 OP 636: Performed by: INTERNAL MEDICINE

## 2017-10-04 RX ORDER — WARFARIN SODIUM 7.5 MG/1
7.5 TABLET ORAL
Status: COMPLETED | OUTPATIENT
Start: 2017-10-04 | End: 2017-10-04

## 2017-10-04 RX ADMIN — MAGNESIUM OXIDE TAB 400 MG (241.3 MG ELEMENTAL MG) 400 MG: 400 (241.3 MG) TAB at 00:19

## 2017-10-04 RX ADMIN — SODIUM CHLORIDE, PRESERVATIVE FREE 5 ML: 5 INJECTION INTRAVENOUS at 06:33

## 2017-10-04 RX ADMIN — ATORVASTATIN CALCIUM 40 MG: 40 TABLET, FILM COATED ORAL at 09:14

## 2017-10-04 RX ADMIN — ASPIRIN 81 MG CHEWABLE TABLET 81 MG: 81 TABLET CHEWABLE at 09:14

## 2017-10-04 RX ADMIN — METOPROLOL SUCCINATE 200 MG: 200 TABLET, EXTENDED RELEASE ORAL at 09:19

## 2017-10-04 RX ADMIN — FUROSEMIDE 80 MG: 40 TABLET ORAL at 09:14

## 2017-10-04 RX ADMIN — WARFARIN SODIUM 7.5 MG: 7.5 TABLET ORAL at 17:05

## 2017-10-04 RX ADMIN — POTASSIUM CHLORIDE 40 MEQ: 1.5 POWDER, FOR SOLUTION ORAL at 09:15

## 2017-10-04 RX ADMIN — Medication 13 ML: at 09:19

## 2017-10-04 RX ADMIN — Medication 1 G: at 09:14

## 2017-10-04 RX ADMIN — ACETAMINOPHEN 650 MG: 325 TABLET, FILM COATED ORAL at 04:25

## 2017-10-04 RX ADMIN — TAMSULOSIN HYDROCHLORIDE 0.4 MG: 0.4 CAPSULE ORAL at 09:14

## 2017-10-04 RX ADMIN — POTASSIUM CHLORIDE 40 MEQ: 1.5 POWDER, FOR SOLUTION ORAL at 20:38

## 2017-10-04 RX ADMIN — Medication 13 ML: at 20:38

## 2017-10-04 RX ADMIN — SODIUM CHLORIDE, PRESERVATIVE FREE 5 ML: 5 INJECTION INTRAVENOUS at 20:40

## 2017-10-04 RX ADMIN — LEVOTHYROXINE SODIUM 150 MCG: 25 TABLET ORAL at 09:15

## 2017-10-04 RX ADMIN — SENNOSIDES AND DOCUSATE SODIUM 2 TABLET: 8.6; 5 TABLET ORAL at 20:39

## 2017-10-04 RX ADMIN — SENNOSIDES AND DOCUSATE SODIUM 2 TABLET: 8.6; 5 TABLET ORAL at 09:14

## 2017-10-04 RX ADMIN — MULTIPLE VITAMINS W/ MINERALS TAB 1 TABLET: TAB at 09:14

## 2017-10-04 RX ADMIN — LISINOPRIL 10 MG: 10 TABLET ORAL at 09:15

## 2017-10-04 RX ADMIN — POTASSIUM CHLORIDE 40 MEQ: 1.5 POWDER, FOR SOLUTION ORAL at 00:19

## 2017-10-04 ASSESSMENT — PAIN DESCRIPTION - DESCRIPTORS: DESCRIPTORS: ACHING

## 2017-10-04 NOTE — PLAN OF CARE
Problem: Patient Care Overview  Goal: Plan of Care/Patient Progress Review  Discharge Planner PT   Patient plan for discharge: TCU  Current status: Engaged pt in sit <> stand transfers from recliner CGA x 2 to heavy mod A x 2 with FWW pending fatigue level, standing B LE strengthening exercises, and small steps forwards and backwards with FWW at CGA to min A x 2. Pt on 1L O2 via NC with sats stable and HR 120s-140s in known afib.   Barriers to return to prior living situation: below baseline mobility (inability to ambulate household distances), lives alone (limited social support)  Recommendations for discharge: TCU  Rationale for recommendations: Below baseline mobility secondary to impairments in B LE strength, activity tolerance, edema, and dynamic balance.        Entered by: Elizabeth Roblero 10/04/2017 2:41 PM

## 2017-10-04 NOTE — PROGRESS NOTES
INTERNAL MEDICINE PROGRESS NOTE    Clarke Moreira ([id number]) admitted on 9/23/2017 09/30/2017    Assessment & Plan:  Clarke Sotelo is a 67 year old male with a history notable for morbid obesity, atrial fibrillation, HFrEF, diabetes, PVD, and untreated depression that was admitted following a fall and CP/SOB on 9/23.  Transferred to MICU on 9/24 for AMS with acute hypoxic/hypercarbic respiratory failure and hypotension requiring initiation of BiPAP and pressors.  Much improved following nasal airway and BiPAP, off pressors and transferred to general medicine for further management. Now treating for AFib with RVR     #Acute on Chronic Hypoxic Hypercapnic Respiratory Failure  #Evidence of Pulmonary Hypertension  #Probable Obesity hypoventilation syndrome  Developed AMS, A fib with RVR to 150-160s and respiratory distress early AM 9/24/17. Sudden decompensated respiratory status could have been secondary to flash pulmonary edema due to a-fib with RVR, exacerbation of an obstructive or restrictive lung pathology, infectious causes (although infectious respiratory panel negative) and worsening of obesity hypoventilation syndrome. ABGs demonstrated pH 7.14, CO2 113. After scheduled CPAP, he has improved in mental status and no AMS events. However, when off CPAP for prolonged time he re accumulates CO2 and becomes lethargic. It has been stressed to put on CPAP at 10pm as the latest and patient has been compliant. Additionally, echo on this admission showed mild reduction of RV function that could be indicative of Pulmonary HTN in the setting of chronic obstructive or restrictive lung disease with OHS, he has  BMI of 62.   - Continue CPAP at night, needs to be on at the latest 2200  - Goal O2 to keep saturations > 88%  - CPAP in the evening with VBG check at midnight  - Started DuoNebs QID  - PT ordered as pt sedentary   - Continue with lasix as below  - Incentive spirometry   - Will need PFTs and sleep study as  outpatient  - CXR in AM    #Atrial fibrillation with RVR  History of A fib in the past, not anticoagulated at home but on metoprolol for rate control. CHADS-VaSC 5. Received loading dose of amiodarone and started on amiodarone load and 6hr drip on arrival to MICU when in afib with RVR and hypotension. However, his atrial fibrillation improved once respiratory status was stabilized. Rate increased last night and persistently in the 150s.  Heart rates elevates with minimal activity and therefore needs to continue rehab. Patient still with volume overload, obesity makes it hard to assess volume status. Expect improvement as diuresis continues.  - Increased metoprolol to 200 PO XL  - Continue warfarin    #Hypercapnic Encephalopathy - resolved  Acute mental status change early AM 9/24 felt to be secondary to acute hypercarbic respiratory failure and acute respiratory acidosis. Mental status has improved once pCO2 improved with nighttime CPAP.  - Monitor mental status  - VBG if mental status change  - CPAP every night @ 2200       #History of HFrEF   #Pulmonary Edema  #CHF exacerbation  History of coronary artery disease/peripheral vascular disease per patient on admission. Last Echo 2008 with EF 40-45%, but on 09/24/17 recheck was described as normal. There might be a diastolic component and most suitable be HFpEF. He is on lisinopril, HCTZ, Metoprolol at home. During respiratory decompensation he did have CXR showing pulmonary edema. Which could secondary to HF exacerbation in the setting of atrial fibrillation. RV mildly reduced, IVC 3.34 without respiratory variability.  CXR from 09/26/17 with improving vascular congestion. Has lost ~20-25 lbs of water weight. Still has room to diurese.  - Increased oral lasix 80 mg daily  - CXR in AM      #Peripheral Vascular Disease  #Venous Stasis   Patient reported worsening numbness/loss of sensation in his feet bilaterally over the last week, acutely worsening since admission.  "No signs of limb ischemia, but US did reveal anterior tibial artery and dorsalis pedis occlusion. Sensation might be an issue of diabetic neuropathy as well.  - Continue with right leg elevation  - VICK with toe pressures as outpatient, lymphedema wraps  - Monitor for signs of acute leg ischemia    #Depression  History of Depression and PTSD with limited social support. Declined therapy and in hospital chaplaincy services. No antidepressants at home. Ongoing concern for recent DNR/DNI code status change during this hospitalization with expressions of passive suicidality - wanting to avoid BiPap even though he understands it is a life sustaining treatment. Reports - \"the life that I live is not a life worth living.\" Psychiatry evaluated patient and did not believe depression was factoring on DNI/DNR and possible comfort care decision. Palliative care is involved and at this moment patient is agreeable to continue management and optimization of respiratory status as it is improving, but is still considering discontinuing BiPAP.   - Continue address goals of care with patient  - Palliative care following    #Lower Extremity Cellulitis - improving  Bilateral lower extremity with stasis dermatitis and ulcers. Area with some tenderness. Erythema and warmth has improve.  Started on cephalosporin on 09/23/17.   - WOC and lymphedema following  - Transition to cephalexin on 09/27/17 if continues with improving respiratory status and not requiring continuous BiPAP  - Complete course of keflex on 10/03/17    #Perisplenic Free Fluid  Noted on CT Chest in the ED 9/24, splenic laceration and perisplenic fluid collection following fall. Hgb has remained stable, no increase in abdominal distension, no abdominal pain. Per surgery, as hemoglobin stable for 24 hours post-fall, can restart anticoagulation.  - Monitor, daily abdominal exams, no tenderness or signs of acute abdomen  - Low threshold to reimage if concern for splenic " hemorrhage   - General surgery consulted, appreciate recs as above  - Started patient on warfarin    #Dyspnea  #Atypical Chest Pain, improving  Developed left sided chest pain and shortness of breath after fall from chair. History of CAD/PVD per patient, not documented in EMR. Troponins x3 negative, D-dimer elevated but CT PE study negative for embolism. Pain not improved with nitroglycerin. CK negative, rhabdo unlikely (normal renal function, potassium). Attributable to musculoskeletal at this point with dyspnea likely from HF exacerbation in setting of dilated IVC without variability with respirations.  - Cardio respiratory monitoring  - Continue PTA lipitor   - Continue ASA 81mg q day  - daily diuresis as above  - Metoprolol as above       #Hypertension   On lisinopril at home given history of hypertension and DMII. Creatinine normal on admission. Now with improved hypotension.  - Continue lisinopril 10 mg q day as not on pressors      #Polycythemia   Hgb 17.2 on admission, peaked at 18.6. Could be secondary to hemoconcentration or chronic hypoxia. Now normalized.       #DMII  On Metformin at home. HgbA1c 6.5 on 8/14/17.  - Low dose SSI      #Hypothyroidism  TSH on admission 3.11, on Synthroid 150 mcg at home  - Continue synthroid 150 mcg       #Hyponatremia, resolved.  - Will continue to monitor sodium as rate of correction appropriate in the past 48 hours.  - Will continue with diuresis  - Daily CMP  - Continue to follow, expect to improve with diuresis     #Hypophosphatemia  Could be secondary to increase respiratory effort.   - Replace PO4  - Monitor improvement      FEN: CHO consistent  Prophylaxis: DVT warfarin  Disposition: Will need optimization of atrial fibrillation and volume status, discharge planning to TCU  Code Status: DNR/DNI    Family: POC is Janice Kady [phone].    Roberto Alexander MD PhD  Internal Medicine PGY2  [phone]    Patient was seen and discussed with  who agrees with above  "assessment and plan.    ==================================================================    Interval HistorySubjective:  Good mood, cooperative. Denies fever, chills, night sweats, headache, shortness of breath, chest pain, nausea, vomiting, diarrhea, constipation.     Objective:  Most recent vital signs:  /70 (BP Location: Left arm)  Pulse 106  Temp 98.1  F (36.7  C) (Oral)  Resp 16  Ht 1.803 m (5' 11\")  Wt (!) 196 kg (432 lb 1.6 oz)  SpO2 94%  BMI 60.27 kg/m2  Temp:  [97.6  F (36.4  C)-98.1  F (36.7  C)] 98.1  F (36.7  C)  Pulse:  [106] 106  Heart Rate:  [101-124] 104  Resp:  [15-20] 16  BP: (103-140)/(64-86) 109/70  FiO2 (%):  [30 %] 30 %  SpO2:  [94 %-100 %] 94 %  Wt Readings from Last 2 Encounters:   09/29/17 (!) 196 kg (432 lb 1.6 oz)   08/14/17 (!) 191 kg (421 lb)       Intake/Output Summary (Last 24 hours) at 09/26/17 1426  Last data filed at 09/26/17 1400   Gross per 24 hour   Intake             1326 ml   Output             1580 ml   Net             -254 ml       Physical exam:  General: Patient lying comfortably in bed, NAD, wearing BiPAP mask  HEENT: EOMI, PERRL, no scleral icterus or injection, no bruits appreciated, difficult to appreciate JVD due to body habitus, MMM  Cardiac: RRR, no m/r/g appreciated.   Respiratory: reduced lung sounds bilaterally across all fields, no wheezing or crackles  GI: Obese, NT, no organomegaly, no signs of acute abdomen  Extremities: +2 edema bilaterally, absent pulses in right dorsalis pedis, lymphedema wraps on   Neuro: AOx3,  Moving all extremities sponteneously    Labs:  Results for orders placed or performed during the hospital encounter of 09/23/17 (from the past 24 hour(s))   Glucose by meter   Result Value Ref Range    Glucose 125 (H) 70 - 99 mg/dL   Basic metabolic panel   Result Value Ref Range    Sodium 135 133 - 144 mmol/L    Potassium 3.6 3.4 - 5.3 mmol/L    Chloride 88 (L) 94 - 109 mmol/L    Carbon Dioxide 42 (H) 20 - 32 mmol/L    Anion Gap 4 3 " - 14 mmol/L    Glucose 107 (H) 70 - 99 mg/dL    Urea Nitrogen 8 7 - 30 mg/dL    Creatinine 0.61 (L) 0.66 - 1.25 mg/dL    GFR Estimate >90 >60 mL/min/1.7m2    GFR Estimate If Black >90 >60 mL/min/1.7m2    Calcium 8.3 (L) 8.5 - 10.1 mg/dL   Glucose by meter   Result Value Ref Range    Glucose 133 (H) 70 - 99 mg/dL   Glucose by meter   Result Value Ref Range    Glucose 118 (H) 70 - 99 mg/dL   Basic metabolic panel   Result Value Ref Range    Sodium 136 133 - 144 mmol/L    Potassium 3.5 3.4 - 5.3 mmol/L    Chloride 89 (L) 94 - 109 mmol/L    Carbon Dioxide 42 (H) 20 - 32 mmol/L    Anion Gap 5 3 - 14 mmol/L    Glucose 102 (H) 70 - 99 mg/dL    Urea Nitrogen 7 7 - 30 mg/dL    Creatinine 0.63 (L) 0.66 - 1.25 mg/dL    GFR Estimate >90 >60 mL/min/1.7m2    GFR Estimate If Black >90 >60 mL/min/1.7m2    Calcium 8.5 8.5 - 10.1 mg/dL   CBC with platelets   Result Value Ref Range    WBC 7.1 4.0 - 11.0 10e9/L    RBC Count 5.16 4.4 - 5.9 10e12/L    Hemoglobin 15.8 13.3 - 17.7 g/dL    Hematocrit 50.4 40.0 - 53.0 %    MCV 98 78 - 100 fl    MCH 30.6 26.5 - 33.0 pg    MCHC 31.3 (L) 31.5 - 36.5 g/dL    RDW 14.0 10.0 - 15.0 %    Platelet Count 159 150 - 450 10e9/L   INR   Result Value Ref Range    INR 2.04 (H) 0.86 - 1.14   Magnesium   Result Value Ref Range    Magnesium 2.0 1.6 - 2.3 mg/dL   Blood gas venous   Result Value Ref Range    Ph Venous 7.40 7.32 - 7.43 pH    PCO2 Venous 79 (HH) 40 - 50 mm Hg    PO2 Venous 35 25 - 47 mm Hg    Bicarbonate Venous 49 (HH) 21 - 28 mmol/L    Base Excess Venous 18.2 mmol/L    FIO2 30.0    Glucose by meter   Result Value Ref Range    Glucose 98 70 - 99 mg/dL   Glucose by meter   Result Value Ref Range    Glucose 107 (H) 70 - 99 mg/dL     Physician Attestation  I, Janiya Niño MD, saw this patient with the resident and agree with the resident s findings and plan of care as documented in the resident s note with my edits.     I personally reviewed vital signs, medications, labs and imaging.    Janiya Niño,  MD  Date of Service (when I saw the patient): 10/3/17

## 2017-10-04 NOTE — PLAN OF CARE
Met with patient at the bedside to discuss rehabilitation options. Patient discussed sedentary lifestyle and activity at home being rather limited, and expressed a plan to improve his activity at home. Discussed different levels of care and plans for post-rehabilitation. Writer closed the conversation with all questions answered.     Thank you for the referral, we will continue to follow this patient for post acute placement.     Determination of admission is based upon the patient's need for an intensive, interdisciplinary approach to rehabilitation, their ability to progress, their ability to tolerate intensive therapies, their need for daily physician supervision, their need for twenty four hour nursing assistance, and their ability and willingness to participate in such a program.    Shamar Bass RN  Rehab Liaison/  [facility]  10/4/2017    12:40 PM

## 2017-10-04 NOTE — PLAN OF CARE
Problem: Patient Care Overview  Goal: Plan of Care/Patient Progress Review  Discharge Planner OT   Patient plan for discharge: TCU  Current status: Pt moderate assist for bed mobility with heavy use of bed rail, mod Ax2 for STS transfer using FWW. Fatigue and pain with transfer from supine position in bed to seated in chair.   Barriers to return to prior living situation: Decreased ind with ADLS, decreased activity tolerance  Recommendations for discharge: TCU  Rationale for recommendations: Continue with standing tolerance with ADLS, increase ind with functional mobility.        Entered by: Anne Marie aDvis 10/04/2017 1:23 PM

## 2017-10-04 NOTE — PLAN OF CARE
Problem: Patient Care Overview  Goal: Plan of Care/Patient Progress Review  Edema 6B: Wraps removed and RN performed wound cares/dressing change to B LEs. Recommend continued use of light compression for management of edema and protect skin integrity. GCB applied from MTPs to knee creases. Remove if causing pain/numbness or become soiled.

## 2017-10-04 NOTE — PLAN OF CARE
Problem: Breathing Pattern Ineffective (Adult)  Goal: Identify Related Risk Factors and Signs and Symptoms  Related risk factors and signs and symptoms are identified upon initiation of Human Response Clinical Practice Guideline (CPG).   Pt A&Ox4, able to make needs known. Pt remains in afib. At rest pt 100-115 and with activity 120-130s. Pt denies pain. Pt on 1-2L NC, can not wean to RA at this time. Pt up to chair with therapy. Unable to get standing scale, will attempt on the way back to bed. Bilateral legs wrapped by lymphedema after dressing changes done by writer per wound care orders. Pt voiding per urinal. No other acute events this shift. Will cont to monitor.

## 2017-10-04 NOTE — PROGRESS NOTES
"PALLIATIVE CONSULT SERVICE  SPIRITUAL ASSESSMENT Progress Note  [facility] ([place]) 6B 6231-01    Patient expressed hopefulness and appreciation to all who have cared for him. Discussed plans for home and his fears about the logistics of making his home suitable for habitation. Redirected conversation from logistics  to explore what type of fear he was experiencing and how he might access resources within himself to \"get to know and make friends with his fear\" and not let it control him. Discussed accidental friendships, the diffence between cure and healing, and old narratives that have limited his assertion of self determination. Patient was eager to think about some hard questions. Will continue to follow.       Farideh Lopez M.Div., [facility], ACPE Educator  Palliative Consult Service   Pager [phone]      "

## 2017-10-04 NOTE — PLAN OF CARE
"Problem: Patient Care Overview  Goal: Plan of Care/Patient Progress Review  Outcome: No Change  /77 (BP Location: Left arm)  Pulse 112  Temp 98.5  F (36.9  C) (Axillary)  Resp 20  Ht 1.803 m (5' 11\")  Wt (!) 196 kg (432 lb 1.6 oz)  SpO2 96%  BMI 60.27 kg/m2     Neuro: A&Ox4.   Cardiac: Afib, high 90s-120s. VSS.       Respiratory: Sating >92% on 2L NC. CPAP at 2200 with 30% FiO2.  GI/: Adequate urine output per urinal, pt refused bladder scan this am. Stated he wanted to sleep longer and would allow nursing to do in the am. No BM this shift.   Diet/appetite: Tolerating moderate consistent CHO diet. Eating well.  Activity:  Assist of 2, up to chair and with repo. Turned q2hrs.  Pain: At acceptable level on current regimen. PRN tylenol given for c/o headache.   Skin: LE lymphedema wraps intact.     One time dose 40 mEq potassium and 400 mg magnesium oxide given overnight.     R: Continue with POC. Notify primary team with changes.             "

## 2017-10-04 NOTE — PROGRESS NOTES
INTERNAL MEDICINE PROGRESS NOTE    Clarke Moreira ([id number]) admitted on 9/23/2017  10/04/2017    Assessment & Plan:  Clarke Sotelo is a 67 year old male with a history notable for morbid obesity, atrial fibrillation, HFrEF, diabetes, PVD, and untreated depression that was admitted following a fall and CP/SOB on 9/23.  Transferred to MICU on 9/24 for AMS with acute hypoxic/hypercarbic respiratory failure and hypotension requiring initiation of BiPAP and pressors.  Much improved following nasal airway and BiPAP, off pressors and transferred to general medicine for further management.     #Acute on Chronic Hypercapnic Respiratory Failure  #Evidence of Pulmonary Hypertension  #Probable Obesity hypoventilation syndrome  Developed AMS, A fib with RVR to 150-160s and respiratory distress early AM 9/24/17. Sudden decompensated respiratory status could have been secondary to flash pulmonary edema secondary to a-fib with RVR, exacerbation of an obstructive or restrictive lung pathology, infectious causes (although infectious respiratory panel negative) and worsening of obesity hypoventilation syndrome. ABGs demonstrated pH 7.14, CO2 113. Following BiPAP and airway management with nasal trumpet, his ABG improved with pH 7.29, CO2 66. No concerns for hypoxemia. Mental status improved significantly once pCO2 in the 60s range. However, when off BiPAP for prolonged time he re accumulates CO2 and becomes lethargic. Echo with mild reduction of RV function, could be secondary to underlying Pulmonary HTN in the setting of chronic obstructive or restrictive lung disease with OHS, he has  BMI of 62. CXR from 10/04/17 with some increase in pulmonary congestion.   - Continue CPAP at night, needs to be on at the latest 2200  - Goal O2 to keep saturations > 88%  - CPAP in the evening with VBG check at midnight  - Started DuoNebs QID  - PT following as pt sedentary   - Continue with lasix as below  - Incentive spirometry   - CXR  on 09/29/17    #Atrial fibrillation with RVR - improving  History of A fib in the past, not anticoagulated at home but on metoprolol for rate control. CHADS-VaSC 5. Received loading dose of amiodarone and started on amiodarone load and 6hr drip on arrival to MICU when in afib with RVR and hypotension. However, his atrial fibrillation improved once respiratory status was stabilized. Rate improved with increase metoprolol RVR could also be driven by volume overload.    - Continue metoprolol 200mg PO XL  - Continue warfarin    #Hypercapnic Encephalopathy - improving  Acute mental status change early AM 9/24 felt to be secondary to acute hypercarbic respiratory failure and acute respiratory acidosis. Mental status has improved once pCO2 in the 60s with BiPAP and now on night time CPAP.  - Monitor mental status  - VBG if mental status change  - CO2 goal of ~60s       #History of HFrEF   #Pulmonary Edema  #CHF exacerbation  History of coronary artery disease/peripheral vascular disease per patient on admission. Last Echo 2008 with EF 40-45%, but on 09/24/17 recheck was described as normal. There might be a diastolic component and most suitable be HFpEF. He is on lisinopril, HCTZ, Metoprolol at home. During respiratory decompensation he did have CXR showing pulmonary edema. Which could secondary to HF exacerbation in the setting of atrial fibrillation. RV mildly reduced, IVC 3.34 without respiratory variability.  CXR from 09/26/17 with improving vascular congestion. Admission weight ~ 460lbs and dry weight around 415-420 lbs. Now 424lbs.   - On oral lasix 80mg PO  - Target net -1 to 1.5L  - BMP BID      #Peripheral Vascular Disease  #Venous Stasis   Patient reported worsening numbness/loss of sensation in his feet bilaterally over the last week, acutely worsening since admission. No signs of limb ischemia, but US did reveal anterior tibial artery and dorsalis pedis occlusion. Sensation might be an issue of diabetic  "neuropathy as well.  - Continue with right leg elevation  - VICK with toe pressures as outpatient, lymphedema wraps  - Monitor for signs of acute leg ischemia    #Depression  History of Depression and PTSD with limited social support. Declined therapy and in hospital chaplaincy services. No antidepressants at home. Ongoing concern for recent DNR/DNI code status change during this hospitalization with expressions of passive suicidality - wanting to avoid BiPap even though he understands it is a life sustaining treatment. Reports - \"the life that I live is not a life worth living.\" Psychiatry evaluated patient and did not believe depression was factoring on DNI/DNR and possible comfort care decision. Palliative care is involved and at this moment patient is agreeable to continue management and optimization of respiratory status as it is improving, but is still considering discontinuing BiPAP.   - Continue address goals of care with patient  - Palliative care following    #Lower Extremity Cellulitis - improving  Bilateral lower extremity with stasis dermatitis and ulcers. Area with some tenderness. Erythema and warmth has improve.  Started on cephalosporin on 09/23/17, completed oral dose of keflex. Leg pain and wound improving as diuresing.    - WOC and lymphedema following    #Perisplenic Free Fluid  Noted on CT Chest in the ED 9/24, splenic laceration and perisplenic fluid collection following fall. Hgb has remained stable, no increase in abdominal distension, no abdominal pain. Per surgery, as hemoglobin stable for 24 hours post-fall, can restart anticoagulation.  - Monitor, daily abdominal exams, no tenderness or signs of acute abdomen  - Intermittent CBC  - Low threshold to reimage if concern for splenic hemorrhage   - General surgery consulted, appreciate recs as above  - Started patient on warfarin    #Dyspnea  #Atypical Chest Pain, improving  Developed left sided chest pain and shortness of breath after fall " from chair. History of CAD/PVD per patient, not documented in EMR. Troponins x3 negative, D-dimer elevated but CT PE study negative for embolism. Pain not improved with nitroglycerin. CK negative, rhabdo unlikely (normal renal function, potassium). Attributable to musculoskeletal at this point with dyspnea likely from HF exacerbation in setting of dilated IVC without variability with respirations.  - Cardio respiratory monitoring  - Continue PTA lipitor   - Continue ASA 81mg q day  - daily diuresis as above  - Metoprolol as above       #Hypertension   On lisinopril at home given history of hypertension and DMII. Creatinine normal on admission. Now with improved hypotension.  - Continue lisinopril 10 mg q day as not on pressors      #Polycythemia   Hgb 17.2 on admission, peaked at 18.6. Could be secondary to hemoconcentration. Now normalized.       #DMII  On Metformin at home. HgbA1c 6.5 on 8/14/17.  - Low dose SSI      #Hypothyroidism  TSH on admission 3.11, on Synthroid 150 mcg at home  - Continue synthroid 150 mcg       #Hyponatremia, resolved.  - Will continue to monitor sodium as rate of correction appropriate in the past 48 hours.  - Will continue with diuresis  - Daily CMP  - Continue to follow, expect to improve with diuresis     #Hypophosphatemia  Could be secondary to increase respiratory effort.   - Replace PO4  - Monitor improvement      FEN: CHO consistent   Prophylaxis: DVT warfarin  Disposition: Will need optimization of respiratory status and discharge to TCU. Patient does not have COPD and due to body habitus he has thoracic restriction disorder and therefore needs CPAP.   Venous Blood Gas    Recent Labs  Lab 09/30/17  0715 09/29/17  0629 09/29/17  0257 09/27/17  2356   PHV 7.40  --  7.37 7.50*   PCO2V 79*  --  77* 55*   PO2V 35  --  54* 34   HCO3V 49*  --  44* 43*   CAR 18.2  --  13.8 16.1   O2PER 30.0 30.0 60 30     Code Status: DNR/DNI    Family: POC is Janice Hillman [phone].    Roberto Alexander  "MD PhD  Internal Medicine PGY2  [phone]    Patient was seen and discussed with  who agrees with above assessment and plan.    ==================================================================    Interval HistorySubjective:  No overnight events. Denies fever, chills, night sweats, headache, shortness of breath, chest pain, nausea, vomiting, diarrhea, constipation. Improving bilateral leg pain.    Objective:  Most recent vital signs:  /82  Pulse 112  Temp 98.6  F (37  C) (Oral)  Resp 18  Ht 1.803 m (5' 11\")  Wt (!) 192.7 kg (424 lb 12.8 oz)  SpO2 94%  BMI 59.25 kg/m2  Temp:  [97.8  F (36.6  C)-98.8  F (37.1  C)] 98.6  F (37  C)  Heart Rate:  [] 92  Resp:  [16-20] 18  BP: (110-136)/(71-96) 110/82  FiO2 (%):  [30 %] 30 %  SpO2:  [94 %-100 %] 94 %  Wt Readings from Last 2 Encounters:   10/04/17 (!) 192.7 kg (424 lb 12.8 oz)   08/14/17 (!) 191 kg (421 lb)       Intake/Output Summary (Last 24 hours) at 09/26/17 1426  Last data filed at 09/26/17 1400   Gross per 24 hour   Intake             1326 ml   Output             1580 ml   Net             -254 ml       Physical exam:  General: Patient lying comfortably in bed, NAD, wearing BiPAP mask  HEENT: EOMI, PERRL, no scleral icterus or injection, no bruits appreciated, difficult to appreciate JVD due to body habitus, MMM  Cardiac: RRR, no m/r/g appreciated.   Respiratory: reduced lung sounds bilaterally across all fields, no wheezing or crackles  GI: Obese, NT, no organomegaly, no signs of acute abdomen  Extremities: +2 edema bilaterally, absent pulses in right dorsalis pedis and anterior tibialis, signs of digital ischemia vs chronic fungi infection   Neuro: AOx3,  Moving all extremities sponteneously    Labs:  Results for orders placed or performed during the hospital encounter of 09/23/17 (from the past 24 hour(s))   Glucose by meter   Result Value Ref Range    Glucose 126 (H) 70 - 99 mg/dL   Basic metabolic panel   Result Value Ref Range    " Sodium 136 133 - 144 mmol/L    Potassium 3.6 3.4 - 5.3 mmol/L    Chloride 94 94 - 109 mmol/L    Carbon Dioxide 40 (H) 20 - 32 mmol/L    Anion Gap 2 (L) 3 - 14 mmol/L    Glucose 132 (H) 70 - 99 mg/dL    Urea Nitrogen 9 7 - 30 mg/dL    Creatinine 0.63 (L) 0.66 - 1.25 mg/dL    GFR Estimate >90 >60 mL/min/1.7m2    GFR Estimate If Black >90 >60 mL/min/1.7m2    Calcium 8.5 8.5 - 10.1 mg/dL   Magnesium   Result Value Ref Range    Magnesium 1.8 1.6 - 2.3 mg/dL   Glucose by meter   Result Value Ref Range    Glucose 111 (H) 70 - 99 mg/dL   Basic metabolic panel   Result Value Ref Range    Sodium 138 133 - 144 mmol/L    Potassium 4.3 3.4 - 5.3 mmol/L    Chloride 94 94 - 109 mmol/L    Carbon Dioxide 40 (H) 20 - 32 mmol/L    Anion Gap 4 3 - 14 mmol/L    Glucose 100 (H) 70 - 99 mg/dL    Urea Nitrogen 7 7 - 30 mg/dL    Creatinine 0.61 (L) 0.66 - 1.25 mg/dL    GFR Estimate >90 >60 mL/min/1.7m2    GFR Estimate If Black >90 >60 mL/min/1.7m2    Calcium 8.7 8.5 - 10.1 mg/dL   INR   Result Value Ref Range    INR 1.95 (H) 0.86 - 1.14   Magnesium (AM Draw)   Result Value Ref Range    Magnesium 2.1 1.6 - 2.3 mg/dL   Glucose by meter   Result Value Ref Range    Glucose 102 (H) 70 - 99 mg/dL   XR Chest Port 1 View    Narrative    Exam: XR CHEST PORT 1 VW, 10/4/2017 9:39 AM    Indication: Follow up CXR on pulmonary edema    Comparison: Chest radiograph 9/26/2017.    Findings:   Single AP view of the chest dated degrees. Right upper extremity PICC  in stable position with tip in the mid SVC. The visualized upper  abdomen is unremarkable. Left-sided rib fractures. Soft tissues are  unremarkable. The trachea is midline. The cardiomediastinal silhouette  is within normal limits. Interval resolution of bilateral pleural  effusions and associated bibasilar atelectasis and consolidation. Mild  interval decrease in pulmonary vascular congestion.      Impression    Impression:   1. Interval resolution bilateral pleural effusions and  associated  bibasilar atelectasis and/or consolidation.  2. Mild interval decrease in pulmonary vascular congestion.   Glucose by meter   Result Value Ref Range    Glucose 131 (H) 70 - 99 mg/dL       Physician Attestation  I, Janiya Niño MD, saw this patient with the resident and agree with the resident s findings and plan of care as documented in the resident s note with my edits.     I personally reviewed vital signs, medications, labs and imaging.    Janiya Niño MD  Date of Service (when I saw the patient): 10/4/17

## 2017-10-05 ENCOUNTER — APPOINTMENT (OUTPATIENT)
Dept: OCCUPATIONAL THERAPY | Facility: CLINIC | Age: 67
DRG: 291 | End: 2017-10-05
Payer: COMMERCIAL

## 2017-10-05 ENCOUNTER — APPOINTMENT (OUTPATIENT)
Dept: PHYSICAL THERAPY | Facility: CLINIC | Age: 67
DRG: 291 | End: 2017-10-05
Payer: COMMERCIAL

## 2017-10-05 LAB
ANION GAP SERPL CALCULATED.3IONS-SCNC: 4 MMOL/L (ref 3–14)
ANION GAP SERPL CALCULATED.3IONS-SCNC: 9 MMOL/L (ref 3–14)
BUN SERPL-MCNC: 7 MG/DL (ref 7–30)
BUN SERPL-MCNC: 8 MG/DL (ref 7–30)
CALCIUM SERPL-MCNC: 8.5 MG/DL (ref 8.5–10.1)
CALCIUM SERPL-MCNC: 8.8 MG/DL (ref 8.5–10.1)
CHLORIDE SERPL-SCNC: 94 MMOL/L (ref 94–109)
CHLORIDE SERPL-SCNC: 95 MMOL/L (ref 94–109)
CO2 SERPL-SCNC: 31 MMOL/L (ref 20–32)
CO2 SERPL-SCNC: 38 MMOL/L (ref 20–32)
CREAT SERPL-MCNC: 0.62 MG/DL (ref 0.66–1.25)
CREAT SERPL-MCNC: 0.68 MG/DL (ref 0.66–1.25)
GFR SERPL CREATININE-BSD FRML MDRD: >90 ML/MIN/1.7M2
GFR SERPL CREATININE-BSD FRML MDRD: >90 ML/MIN/1.7M2
GLUCOSE BLDC GLUCOMTR-MCNC: 100 MG/DL (ref 70–99)
GLUCOSE BLDC GLUCOMTR-MCNC: 105 MG/DL (ref 70–99)
GLUCOSE BLDC GLUCOMTR-MCNC: 106 MG/DL (ref 70–99)
GLUCOSE BLDC GLUCOMTR-MCNC: 90 MG/DL (ref 70–99)
GLUCOSE SERPL-MCNC: 101 MG/DL (ref 70–99)
GLUCOSE SERPL-MCNC: 97 MG/DL (ref 70–99)
INR PPP: 2.05 (ref 0.86–1.14)
LACTATE BLD-SCNC: 0.7 MMOL/L (ref 0.7–2)
MAGNESIUM SERPL-MCNC: 2.1 MG/DL (ref 1.6–2.3)
MAGNESIUM SERPL-MCNC: 2.2 MG/DL (ref 1.6–2.3)
PLATELET # BLD AUTO: 205 10E9/L (ref 150–450)
POTASSIUM SERPL-SCNC: 4.2 MMOL/L (ref 3.4–5.3)
POTASSIUM SERPL-SCNC: 4.3 MMOL/L (ref 3.4–5.3)
SODIUM SERPL-SCNC: 134 MMOL/L (ref 133–144)
SODIUM SERPL-SCNC: 137 MMOL/L (ref 133–144)

## 2017-10-05 PROCEDURE — 99231 SBSQ HOSP IP/OBS SF/LOW 25: CPT | Mod: GC | Performed by: INTERNAL MEDICINE

## 2017-10-05 PROCEDURE — 97140 MANUAL THERAPY 1/> REGIONS: CPT | Mod: GO | Performed by: OCCUPATIONAL THERAPIST

## 2017-10-05 PROCEDURE — 85610 PROTHROMBIN TIME: CPT | Performed by: INTERNAL MEDICINE

## 2017-10-05 PROCEDURE — 25000132 ZZH RX MED GY IP 250 OP 250 PS 637: Performed by: INTERNAL MEDICINE

## 2017-10-05 PROCEDURE — 80048 BASIC METABOLIC PNL TOTAL CA: CPT | Performed by: INTERNAL MEDICINE

## 2017-10-05 PROCEDURE — 25000128 H RX IP 250 OP 636: Performed by: INTERNAL MEDICINE

## 2017-10-05 PROCEDURE — 12000008 ZZH R&B INTERMEDIATE UMMC

## 2017-10-05 PROCEDURE — 00000146 ZZHCL STATISTIC GLUCOSE BY METER IP

## 2017-10-05 PROCEDURE — 40000275 ZZH STATISTIC RCP TIME EA 10 MIN

## 2017-10-05 PROCEDURE — 40000133 ZZH STATISTIC OT WARD VISIT: Performed by: OCCUPATIONAL THERAPIST

## 2017-10-05 PROCEDURE — 83605 ASSAY OF LACTIC ACID: CPT | Performed by: INTERNAL MEDICINE

## 2017-10-05 PROCEDURE — 97116 GAIT TRAINING THERAPY: CPT | Mod: GP

## 2017-10-05 PROCEDURE — 97110 THERAPEUTIC EXERCISES: CPT | Mod: GP

## 2017-10-05 PROCEDURE — 25000132 ZZH RX MED GY IP 250 OP 250 PS 637: Performed by: HOSPITALIST

## 2017-10-05 PROCEDURE — 80048 BASIC METABOLIC PNL TOTAL CA: CPT | Performed by: STUDENT IN AN ORGANIZED HEALTH CARE EDUCATION/TRAINING PROGRAM

## 2017-10-05 PROCEDURE — 97110 THERAPEUTIC EXERCISES: CPT | Mod: GO | Performed by: OCCUPATIONAL THERAPIST

## 2017-10-05 PROCEDURE — 97530 THERAPEUTIC ACTIVITIES: CPT | Mod: GO | Performed by: OCCUPATIONAL THERAPIST

## 2017-10-05 PROCEDURE — 40000193 ZZH STATISTIC PT WARD VISIT

## 2017-10-05 PROCEDURE — 25000132 ZZH RX MED GY IP 250 OP 250 PS 637: Performed by: STUDENT IN AN ORGANIZED HEALTH CARE EDUCATION/TRAINING PROGRAM

## 2017-10-05 PROCEDURE — 36592 COLLECT BLOOD FROM PICC: CPT | Performed by: INTERNAL MEDICINE

## 2017-10-05 PROCEDURE — 85049 AUTOMATED PLATELET COUNT: CPT | Performed by: INTERNAL MEDICINE

## 2017-10-05 PROCEDURE — 40000802 ZZH SITE CHECK

## 2017-10-05 PROCEDURE — 97530 THERAPEUTIC ACTIVITIES: CPT | Mod: GP

## 2017-10-05 PROCEDURE — 97535 SELF CARE MNGMENT TRAINING: CPT | Mod: GO | Performed by: OCCUPATIONAL THERAPIST

## 2017-10-05 PROCEDURE — 94660 CPAP INITIATION&MGMT: CPT

## 2017-10-05 PROCEDURE — 36592 COLLECT BLOOD FROM PICC: CPT | Performed by: STUDENT IN AN ORGANIZED HEALTH CARE EDUCATION/TRAINING PROGRAM

## 2017-10-05 PROCEDURE — 83735 ASSAY OF MAGNESIUM: CPT | Performed by: INTERNAL MEDICINE

## 2017-10-05 RX ORDER — WARFARIN SODIUM 7.5 MG/1
7.5 TABLET ORAL
Status: COMPLETED | OUTPATIENT
Start: 2017-10-05 | End: 2017-10-05

## 2017-10-05 RX ADMIN — SENNOSIDES AND DOCUSATE SODIUM 1 TABLET: 8.6; 5 TABLET ORAL at 21:11

## 2017-10-05 RX ADMIN — MULTIPLE VITAMINS W/ MINERALS TAB 1 TABLET: TAB at 08:13

## 2017-10-05 RX ADMIN — SODIUM CHLORIDE, PRESERVATIVE FREE 10 ML: 5 INJECTION INTRAVENOUS at 21:31

## 2017-10-05 RX ADMIN — WARFARIN SODIUM 7.5 MG: 7.5 TABLET ORAL at 19:36

## 2017-10-05 RX ADMIN — METOPROLOL SUCCINATE 200 MG: 200 TABLET, EXTENDED RELEASE ORAL at 08:13

## 2017-10-05 RX ADMIN — LEVOTHYROXINE SODIUM 150 MCG: 25 TABLET ORAL at 08:12

## 2017-10-05 RX ADMIN — Medication 13 ML: at 12:52

## 2017-10-05 RX ADMIN — POLYETHYLENE GLYCOL 3350 17 G: 17 POWDER, FOR SOLUTION ORAL at 08:14

## 2017-10-05 RX ADMIN — ATORVASTATIN CALCIUM 40 MG: 40 TABLET, FILM COATED ORAL at 08:13

## 2017-10-05 RX ADMIN — ASPIRIN 81 MG CHEWABLE TABLET 81 MG: 81 TABLET CHEWABLE at 08:13

## 2017-10-05 RX ADMIN — Medication 1 G: at 08:13

## 2017-10-05 RX ADMIN — SODIUM CHLORIDE, PRESERVATIVE FREE 5 ML: 5 INJECTION INTRAVENOUS at 21:31

## 2017-10-05 RX ADMIN — POTASSIUM CHLORIDE 40 MEQ: 1.5 POWDER, FOR SOLUTION ORAL at 08:11

## 2017-10-05 RX ADMIN — POTASSIUM CHLORIDE 40 MEQ: 1.5 POWDER, FOR SOLUTION ORAL at 21:10

## 2017-10-05 RX ADMIN — ALTEPLASE 1 MG: 2.2 INJECTION, POWDER, LYOPHILIZED, FOR SOLUTION INTRAVENOUS at 06:44

## 2017-10-05 RX ADMIN — LISINOPRIL 10 MG: 10 TABLET ORAL at 08:21

## 2017-10-05 RX ADMIN — Medication 13 ML: at 21:11

## 2017-10-05 RX ADMIN — FUROSEMIDE 80 MG: 40 TABLET ORAL at 08:13

## 2017-10-05 RX ADMIN — TAMSULOSIN HYDROCHLORIDE 0.4 MG: 0.4 CAPSULE ORAL at 08:14

## 2017-10-05 RX ADMIN — SENNOSIDES AND DOCUSATE SODIUM 2 TABLET: 8.6; 5 TABLET ORAL at 08:13

## 2017-10-05 NOTE — PLAN OF CARE
Problem: Patient Care Overview  Goal: Plan of Care/Patient Progress Review  Outcome: No Change  Neuro: A&Ox4.   Cardiac: Afib, high 90s-120s. VSS.       Respiratory: BIPAP at 2200 with 30% FiO2.  GI/: Adequate urine output per urinal, last void 1300, pt stated will void later and didn't want to do BS. No BM  Diet/appetite: Tolerating moderate consistent CHO diet. Eating well.  Activity:  Assist of 2, up to chair and with repo. Turned q2hrs.  Pain: At acceptable level on current regimen. PRN tylenol given for c/o headache.   Skin: LE lymphedema wraps intact.

## 2017-10-05 NOTE — PROGRESS NOTES
INTERNAL MEDICINE PROGRESS NOTE    Clarke Moreira ([id number]) admitted on 9/23/2017  10/05/2017    Assessment & Plan:  Clarke Sotelo is a 67 year old male with a history notable for morbid obesity, atrial fibrillation, HFrEF, diabetes, PVD, and untreated depression that was admitted following a fall and CP/SOB on 9/23.  Transferred to MICU on 9/24 for AMS with acute hypoxic/hypercarbic respiratory failure and hypotension requiring initiation of BiPAP and pressors.  Much improved following nasal airway and BiPAP, off pressors and transferred to general medicine for further management.     #Acute on Chronic Hypercapnic Respiratory Failure  #Evidence of Pulmonary Hypertension  #Probable Obesity hypoventilation syndrome  Developed AMS, A fib with RVR to 150-160s and respiratory distress early AM 9/24/17. Sudden decompensated respiratory status could have been secondary to flash pulmonary edema secondary to a-fib with RVR, exacerbation of an obstructive or restrictive lung pathology, infectious causes (although infectious respiratory panel negative) and worsening of obesity hypoventilation syndrome. ABGs demonstrated pH 7.14, CO2 113. Following BiPAP and airway management with nasal trumpet, his ABG improved with pH 7.29, CO2 66. No concerns for hypoxemia. Mental status improved significantly once pCO2 in the 60s range. However, when off BiPAP for prolonged time he re accumulates CO2 and becomes lethargic. Echo with mild reduction of RV function, could be secondary to underlying Pulmonary HTN in the setting of chronic obstructive or restrictive lung disease with OHS, he has  BMI of 62. CXR from 10/04/17 with some increase in pulmonary congestion.   - Continue CPAP at night, needs to be on at the latest 2200  - Goal O2 to keep saturations > 88%  - CPAP in the evening with VBG check at midnight  - Started DuoNebs QID  - PT following as pt sedentary   - Continue with lasix as below  - Incentive spirometry   - CXR  on 09/29/17    #Atrial fibrillation with RVR - improving  History of A fib in the past, not anticoagulated at home but on metoprolol for rate control. CHADS-VaSC 5. Received loading dose of amiodarone and started on amiodarone load and 6hr drip on arrival to MICU when in afib with RVR and hypotension. However, his atrial fibrillation improved once respiratory status was stabilized. Rate improved with increase metoprolol RVR could also be driven by volume overload.    - Continue metoprolol 200mg PO XL  - Continue warfarin    #Hypercapnic Encephalopathy - improving  Acute mental status change early AM 9/24 felt to be secondary to acute hypercarbic respiratory failure and acute respiratory acidosis. Mental status has improved once pCO2 in the 60s with BiPAP and now on night time CPAP.  - Monitor mental status  - VBG if mental status change  - CO2 goal of ~60s       #History of HFrEF   #Pulmonary Edema  #CHF exacerbation  History of coronary artery disease/peripheral vascular disease per patient on admission. Last Echo 2008 with EF 40-45%, but on 09/24/17 recheck was described as normal. There might be a diastolic component and most suitable be HFpEF. He is on lisinopril, HCTZ, Metoprolol at home. During respiratory decompensation he did have CXR showing pulmonary edema. Which could secondary to HF exacerbation in the setting of atrial fibrillation. RV mildly reduced, IVC 3.34 without respiratory variability.  CXR from 09/26/17 with improving vascular congestion. Admission weight ~ 460lbs and dry weight around 415-420 lbs. Now 415-424lbs.   - On oral lasix 80mg PO  - Target net -1 to 1.5L, will target net neutral  - BMP BID      #Peripheral Vascular Disease  #Venous Stasis   Patient reported worsening numbness/loss of sensation in his feet bilaterally over the last week, acutely worsening since admission. No signs of limb ischemia, but US did reveal anterior tibial artery and dorsalis pedis occlusion. Sensation might be  "an issue of diabetic neuropathy as well.  - Continue with right leg elevation  - VICK with toe pressures as outpatient, lymphedema wraps  - Monitor for signs of acute leg ischemia    #Depression  History of Depression and PTSD with limited social support. Declined therapy and in hospital chaplaincy services. No antidepressants at home. Ongoing concern for recent DNR/DNI code status change during this hospitalization with expressions of passive suicidality - wanting to avoid BiPap even though he understands it is a life sustaining treatment. Reports - \"the life that I live is not a life worth living.\" Psychiatry evaluated patient and did not believe depression was factoring on DNI/DNR and possible comfort care decision. Palliative care is involved and at this moment patient is agreeable to continue management and optimization of respiratory status as it is improving, but is still considering discontinuing BiPAP.   - Continue address goals of care with patient  - Palliative care following    #Lower Extremity Cellulitis - improving  Bilateral lower extremity with stasis dermatitis and ulcers. Area with some tenderness. Erythema and warmth has improve.  Started on cephalosporin on 09/23/17, completed oral dose of keflex. Leg pain and wound improving as diuresing.    - WOC and lymphedema following    #Perisplenic Free Fluid  Noted on CT Chest in the ED 9/24, splenic laceration and perisplenic fluid collection following fall. Hgb has remained stable, no increase in abdominal distension, no abdominal pain. Per surgery, as hemoglobin stable for 24 hours post-fall, can restart anticoagulation.  - Monitor, daily abdominal exams, no tenderness or signs of acute abdomen  - Intermittent CBC  - Low threshold to reimage if concern for splenic hemorrhage   - General surgery consulted, appreciate recs as above  - Started patient on warfarin    #Dyspnea  #Atypical Chest Pain, improving  Developed left sided chest pain and shortness of " breath after fall from chair. History of CAD/PVD per patient, not documented in EMR. Troponins x3 negative, D-dimer elevated but CT PE study negative for embolism. Pain not improved with nitroglycerin. CK negative, rhabdo unlikely (normal renal function, potassium). Attributable to musculoskeletal at this point with dyspnea likely from HF exacerbation in setting of dilated IVC without variability with respirations.  - Cardio respiratory monitoring  - Continue PTA lipitor   - Continue ASA 81mg q day  - daily diuresis as above  - Metoprolol as above       #Hypertension   On lisinopril at home given history of hypertension and DMII. Creatinine normal on admission. Now with improved hypotension.  - Continue lisinopril 10 mg q day as not on pressors      #Polycythemia   Hgb 17.2 on admission, peaked at 18.6. Could be secondary to hemoconcentration. Now normalized.       #DMII  On Metformin at home. HgbA1c 6.5 on 8/14/17.  - Low dose SSI      #Hypothyroidism  TSH on admission 3.11, on Synthroid 150 mcg at home  - Continue synthroid 150 mcg       #Hyponatremia, resolved.  - Will continue to monitor sodium as rate of correction appropriate in the past 48 hours.  - Will continue with diuresis  - Daily CMP  - Continue to follow, expect to improve with diuresis     #Hypophosphatemia  Could be secondary to increase respiratory effort.   - Replace PO4  - Monitor improvement      FEN: CHO consistent   Prophylaxis: DVT warfarin  Disposition: Pending TCU placement. Patient does not have COPD and due to body habitus he has thoracic restriction disorder and therefore needs CPAP.   Venous Blood Gas    Recent Labs  Lab 09/30/17  0715 09/29/17  0629 09/29/17  0257   PHV 7.40  --  7.37   PCO2V 79*  --  77*   PO2V 35  --  54*   HCO3V 49*  --  44*   CAR 18.2  --  13.8   O2PER 30.0 30.0 60     Code Status: DNR/DNI    Family: POC is Janice Hillman [phone].    Roberto Alexander MD PhD  Internal Medicine PGY2  [phone]    Patient was seen and  "discussed with  who agrees with above assessment and plan.    ==================================================================    Interval HistorySubjective:  No overnight events. Upset at frequent interruptions. Denies fever, chills, night sweats, headache, shortness of breath, chest pain, nausea, vomiting, diarrhea, constipation. Improving bilateral leg pain.    Objective:  Most recent vital signs:  /88  Pulse 112  Temp 98.1  F (36.7  C) (Oral)  Resp 18  Ht 1.803 m (5' 11\")  Wt (!) 188.4 kg (415 lb 5.6 oz)  SpO2 95%  BMI 57.93 kg/m2  Temp:  [97.6  F (36.4  C)-98.6  F (37  C)] 98.1  F (36.7  C)  Heart Rate:  [] 146  Resp:  [18-28] 18  BP: (101-133)/(69-95) 115/88  FiO2 (%):  [30 %] 30 %  SpO2:  [92 %-100 %] 95 %  Wt Readings from Last 2 Encounters:   10/05/17 (!) 188.4 kg (415 lb 5.6 oz)   08/14/17 (!) 191 kg (421 lb)       Intake/Output Summary (Last 24 hours) at 09/26/17 1426  Last data filed at 09/26/17 1400   Gross per 24 hour   Intake             1326 ml   Output             1580 ml   Net             -254 ml       Physical exam:  General: Patient lying comfortably in bed, NAD, wearing BiPAP mask  HEENT: EOMI, PERRL, no scleral icterus or injection, no bruits appreciated, difficult to appreciate JVD due to body habitus, MMM  Cardiac: RRR, no m/r/g appreciated.   Respiratory: reduced lung sounds bilaterally across all fields, no wheezing or crackles  GI: Obese, NT, no organomegaly, no signs of acute abdomen  Extremities: +2 edema bilaterally, absent pulses in right dorsalis pedis and anterior tibialis, signs of digital ischemia vs chronic fungi infection   Neuro: AOx3,  Moving all extremities sponteneously    Labs:  Results for orders placed or performed during the hospital encounter of 09/23/17 (from the past 24 hour(s))   Glucose by meter   Result Value Ref Range    Glucose 107 (H) 70 - 99 mg/dL   Glucose by meter   Result Value Ref Range    Glucose 100 (H) 70 - 99 mg/dL   Basic " metabolic panel   Result Value Ref Range    Sodium 136 133 - 144 mmol/L    Potassium 4.3 3.4 - 5.3 mmol/L    Chloride 92 (L) 94 - 109 mmol/L    Carbon Dioxide 41 (H) 20 - 32 mmol/L    Anion Gap 2 (L) 3 - 14 mmol/L    Glucose 114 (H) 70 - 99 mg/dL    Urea Nitrogen 8 7 - 30 mg/dL    Creatinine 0.65 (L) 0.66 - 1.25 mg/dL    GFR Estimate >90 >60 mL/min/1.7m2    GFR Estimate If Black >90 >60 mL/min/1.7m2    Calcium 8.6 8.5 - 10.1 mg/dL   Magnesium   Result Value Ref Range    Magnesium 2.2 1.6 - 2.3 mg/dL   Glucose by meter   Result Value Ref Range    Glucose 106 (H) 70 - 99 mg/dL   INR   Result Value Ref Range    INR 2.05 (H) 0.86 - 1.14   Magnesium   Result Value Ref Range    Magnesium 2.1 1.6 - 2.3 mg/dL   Basic metabolic panel   Result Value Ref Range    Sodium 137 133 - 144 mmol/L    Potassium 4.2 3.4 - 5.3 mmol/L    Chloride 95 94 - 109 mmol/L    Carbon Dioxide 38 (H) 20 - 32 mmol/L    Anion Gap 4 3 - 14 mmol/L    Glucose 101 (H) 70 - 99 mg/dL    Urea Nitrogen 7 7 - 30 mg/dL    Creatinine 0.62 (L) 0.66 - 1.25 mg/dL    GFR Estimate >90 >60 mL/min/1.7m2    GFR Estimate If Black >90 >60 mL/min/1.7m2    Calcium 8.5 8.5 - 10.1 mg/dL   Platelet count   Result Value Ref Range    Platelet Count 205 150 - 450 10e9/L   Lactic acid level STAT   Result Value Ref Range    Lactic Acid 0.7 0.7 - 2.0 mmol/L   Glucose by meter   Result Value Ref Range    Glucose 90 70 - 99 mg/dL   Glucose by meter   Result Value Ref Range    Glucose 100 (H) 70 - 99 mg/dL

## 2017-10-05 NOTE — PLAN OF CARE
Problem: Patient Care Overview  Goal: Plan of Care/Patient Progress Review  Discharge Planner OT   Patient plan for discharge: TCU  Current status: Pt minAx2 with FWW with STS, CGA with functional activities in standing position using FWW, tolerated standing position with FWW 10 min.  Barriers to return to prior living situation: Decreased ind with ADLS, decreased activity tolerance, fall risk  Recommendations for discharge: TCU  Rationale for recommendations: Increased activity tolerance with standing ADLS       Entered by: Anne Marie Davis 10/05/2017 12:06 PM

## 2017-10-05 NOTE — PLAN OF CARE
"Problem: Patient Care Overview  Goal: Plan of Care/Patient Progress Review  BP (!) 133/95 (BP Location: Left arm)  Pulse 112  Temp 97.6  F (36.4  C) (Axillary)  Resp 19  Ht 1.803 m (5' 11\")  Wt (!) 188.4 kg (415 lb 5.6 oz)  SpO2 100%  BMI 57.93 kg/m2  Neuro: A&Ox4.   Cardiac: A.Fib, HR 's, VSS.                       Respiratory: Sating 98% on 30% FiO2 on BiPaP worn overnight.  GI/:Urine output via urinal, no BM this shift.   Diet/appetite: Tolerating moderate consistent carb diet.  Activity:  Assist of 2. Repositioned x1, declined otherwise.   Pain:Denies pain.   Skin: Lymph wraps in place, pedal pulses present to doppler.   LDA's: PICC white lumen alteplase instilled for occulusion, grey blood return noted, red patent.       Plan: Continue with POC. Notify primary team with changes.      "

## 2017-10-05 NOTE — PLAN OF CARE
Problem: Patient Care Overview  Goal: Plan of Care/Patient Progress Review  Outcome: Improving  Neuro: A&Ox4.   Cardiac: A.fib with rates 's. VSS.         Respiratory: Sating 88-97 on RA.  GI/: Adequate urine output, needed to be straight cath'd this afternoon after 3 failed attempts to use urinal.  Diet/appetite: Tolerating reg diet. Eating well.  Activity:  Assist of 2, up to chair.  Pain: At acceptable level on current regimen.   Skin: Intact, no new deficits noted, left leg re-wrapped.  LDA's: Triple lumen PICC.     Plan: Continue with POC. Notify primary team with changes.

## 2017-10-05 NOTE — PLAN OF CARE
Problem: Patient Care Overview  Goal: Plan of Care/Patient Progress Review  Edema 6B: Wraps removed and wound cares/dressing change performed by RN. Remaining skin with dryness and firm 2+ pitting. Reapplication of GCB from MTPs to knee creases for further management of LE edema and protection of skin integrity. Remove wraps if causing pain/numbness or becomes soiled.

## 2017-10-05 NOTE — PLAN OF CARE
Problem: Patient Care Overview  Goal: Plan of Care/Patient Progress Review  Discharge Planner PT   Patient plan for discharge: TCU  Current status: Engaged pt in sit <> stand transfers at CGA x 1-2 with FWW, very slowed and strenuous gait training for ~10 ft with wide FWW at CGA + recliner following, and seated B LE strengthening exercises. Pt on RA throughout with HR ranging .   Barriers to return to prior living situation: limited social support, inability to ambulate household distances  Recommendations for discharge: TCU  Rationale for recommendations: to progress IND with functional mobility and return pt to PLOF - pt limited by B LE strength, activity tolerance, edema, and dynamic balance.        Entered by: Elizabeth Roblero 10/05/2017 2:49 PM

## 2017-10-06 ENCOUNTER — APPOINTMENT (OUTPATIENT)
Dept: OCCUPATIONAL THERAPY | Facility: CLINIC | Age: 67
DRG: 291 | End: 2017-10-06
Payer: COMMERCIAL

## 2017-10-06 LAB
ANION GAP SERPL CALCULATED.3IONS-SCNC: 2 MMOL/L (ref 3–14)
ANION GAP SERPL CALCULATED.3IONS-SCNC: 6 MMOL/L (ref 3–14)
BUN SERPL-MCNC: 6 MG/DL (ref 7–30)
BUN SERPL-MCNC: 9 MG/DL (ref 7–30)
CALCIUM SERPL-MCNC: 8.7 MG/DL (ref 8.5–10.1)
CALCIUM SERPL-MCNC: 8.8 MG/DL (ref 8.5–10.1)
CHLORIDE SERPL-SCNC: 95 MMOL/L (ref 94–109)
CHLORIDE SERPL-SCNC: 95 MMOL/L (ref 94–109)
CO2 SERPL-SCNC: 37 MMOL/L (ref 20–32)
CO2 SERPL-SCNC: 38 MMOL/L (ref 20–32)
CREAT SERPL-MCNC: 0.66 MG/DL (ref 0.66–1.25)
CREAT SERPL-MCNC: 0.8 MG/DL (ref 0.66–1.25)
GFR SERPL CREATININE-BSD FRML MDRD: >90 ML/MIN/1.7M2
GFR SERPL CREATININE-BSD FRML MDRD: >90 ML/MIN/1.7M2
GLUCOSE BLDC GLUCOMTR-MCNC: 101 MG/DL (ref 70–99)
GLUCOSE BLDC GLUCOMTR-MCNC: 103 MG/DL (ref 70–99)
GLUCOSE BLDC GLUCOMTR-MCNC: 114 MG/DL (ref 70–99)
GLUCOSE BLDC GLUCOMTR-MCNC: 123 MG/DL (ref 70–99)
GLUCOSE BLDC GLUCOMTR-MCNC: 128 MG/DL (ref 70–99)
GLUCOSE SERPL-MCNC: 101 MG/DL (ref 70–99)
GLUCOSE SERPL-MCNC: 94 MG/DL (ref 70–99)
INR PPP: 1.91 (ref 0.86–1.14)
MAGNESIUM SERPL-MCNC: 2.1 MG/DL (ref 1.6–2.3)
MAGNESIUM SERPL-MCNC: 2.1 MG/DL (ref 1.6–2.3)
POTASSIUM SERPL-SCNC: 4.2 MMOL/L (ref 3.4–5.3)
POTASSIUM SERPL-SCNC: 4.3 MMOL/L (ref 3.4–5.3)
SODIUM SERPL-SCNC: 136 MMOL/L (ref 133–144)
SODIUM SERPL-SCNC: 137 MMOL/L (ref 133–144)

## 2017-10-06 PROCEDURE — 97530 THERAPEUTIC ACTIVITIES: CPT | Mod: GO | Performed by: OCCUPATIONAL THERAPIST

## 2017-10-06 PROCEDURE — 40000133 ZZH STATISTIC OT WARD VISIT: Performed by: OCCUPATIONAL THERAPIST

## 2017-10-06 PROCEDURE — 40000275 ZZH STATISTIC RCP TIME EA 10 MIN

## 2017-10-06 PROCEDURE — 25000128 H RX IP 250 OP 636: Performed by: STUDENT IN AN ORGANIZED HEALTH CARE EDUCATION/TRAINING PROGRAM

## 2017-10-06 PROCEDURE — 97140 MANUAL THERAPY 1/> REGIONS: CPT | Mod: GO | Performed by: OCCUPATIONAL THERAPIST

## 2017-10-06 PROCEDURE — 80048 BASIC METABOLIC PNL TOTAL CA: CPT | Performed by: STUDENT IN AN ORGANIZED HEALTH CARE EDUCATION/TRAINING PROGRAM

## 2017-10-06 PROCEDURE — 25000132 ZZH RX MED GY IP 250 OP 250 PS 637: Performed by: INTERNAL MEDICINE

## 2017-10-06 PROCEDURE — 12000008 ZZH R&B INTERMEDIATE UMMC

## 2017-10-06 PROCEDURE — 85610 PROTHROMBIN TIME: CPT | Performed by: INTERNAL MEDICINE

## 2017-10-06 PROCEDURE — 25000128 H RX IP 250 OP 636: Performed by: INTERNAL MEDICINE

## 2017-10-06 PROCEDURE — 36592 COLLECT BLOOD FROM PICC: CPT | Performed by: STUDENT IN AN ORGANIZED HEALTH CARE EDUCATION/TRAINING PROGRAM

## 2017-10-06 PROCEDURE — 97110 THERAPEUTIC EXERCISES: CPT | Mod: GO | Performed by: OCCUPATIONAL THERAPIST

## 2017-10-06 PROCEDURE — 83735 ASSAY OF MAGNESIUM: CPT | Performed by: STUDENT IN AN ORGANIZED HEALTH CARE EDUCATION/TRAINING PROGRAM

## 2017-10-06 PROCEDURE — 99232 SBSQ HOSP IP/OBS MODERATE 35: CPT | Mod: GC | Performed by: INTERNAL MEDICINE

## 2017-10-06 PROCEDURE — 97535 SELF CARE MNGMENT TRAINING: CPT | Mod: GO | Performed by: OCCUPATIONAL THERAPIST

## 2017-10-06 PROCEDURE — 00000146 ZZHCL STATISTIC GLUCOSE BY METER IP

## 2017-10-06 PROCEDURE — 25000132 ZZH RX MED GY IP 250 OP 250 PS 637: Performed by: STUDENT IN AN ORGANIZED HEALTH CARE EDUCATION/TRAINING PROGRAM

## 2017-10-06 PROCEDURE — 94660 CPAP INITIATION&MGMT: CPT

## 2017-10-06 RX ORDER — LIDOCAINE 50 MG/G
1 PATCH TOPICAL
Status: DISCONTINUED | OUTPATIENT
Start: 2017-10-06 | End: 2017-10-06

## 2017-10-06 RX ORDER — HYDROMORPHONE HYDROCHLORIDE 2 MG/1
2 TABLET ORAL ONCE
Status: DISCONTINUED | OUTPATIENT
Start: 2017-10-06 | End: 2017-10-06

## 2017-10-06 RX ORDER — DILTIAZEM HYDROCHLORIDE 30 MG/1
30 TABLET, FILM COATED ORAL 2 TIMES DAILY
Status: DISCONTINUED | OUTPATIENT
Start: 2017-10-06 | End: 2017-10-08 | Stop reason: HOSPADM

## 2017-10-06 RX ORDER — FUROSEMIDE 10 MG/ML
20 INJECTION INTRAMUSCULAR; INTRAVENOUS ONCE
Status: COMPLETED | OUTPATIENT
Start: 2017-10-06 | End: 2017-10-06

## 2017-10-06 RX ADMIN — Medication 1 G: at 08:37

## 2017-10-06 RX ADMIN — ATORVASTATIN CALCIUM 40 MG: 40 TABLET, FILM COATED ORAL at 08:37

## 2017-10-06 RX ADMIN — FUROSEMIDE 80 MG: 40 TABLET ORAL at 08:44

## 2017-10-06 RX ADMIN — SODIUM CHLORIDE, PRESERVATIVE FREE 5 ML: 5 INJECTION INTRAVENOUS at 21:45

## 2017-10-06 RX ADMIN — LEVOTHYROXINE SODIUM 150 MCG: 25 TABLET ORAL at 08:35

## 2017-10-06 RX ADMIN — LISINOPRIL 10 MG: 10 TABLET ORAL at 08:37

## 2017-10-06 RX ADMIN — SODIUM CHLORIDE, PRESERVATIVE FREE 5 ML: 5 INJECTION INTRAVENOUS at 09:03

## 2017-10-06 RX ADMIN — MULTIPLE VITAMINS W/ MINERALS TAB 1 TABLET: TAB at 08:37

## 2017-10-06 RX ADMIN — ASPIRIN 81 MG CHEWABLE TABLET 81 MG: 81 TABLET CHEWABLE at 08:37

## 2017-10-06 RX ADMIN — METOPROLOL SUCCINATE 200 MG: 200 TABLET, EXTENDED RELEASE ORAL at 08:43

## 2017-10-06 RX ADMIN — Medication 9 MG: at 17:54

## 2017-10-06 RX ADMIN — POTASSIUM CHLORIDE 40 MEQ: 1.5 POWDER, FOR SOLUTION ORAL at 08:37

## 2017-10-06 RX ADMIN — SODIUM CHLORIDE, PRESERVATIVE FREE 10 ML: 5 INJECTION INTRAVENOUS at 16:14

## 2017-10-06 RX ADMIN — FUROSEMIDE 20 MG: 10 INJECTION, SOLUTION INTRAVENOUS at 02:03

## 2017-10-06 RX ADMIN — TAMSULOSIN HYDROCHLORIDE 0.4 MG: 0.4 CAPSULE ORAL at 08:37

## 2017-10-06 RX ADMIN — POTASSIUM CHLORIDE 40 MEQ: 1.5 POWDER, FOR SOLUTION ORAL at 19:12

## 2017-10-06 NOTE — PLAN OF CARE
Problem: Patient Care Overview  Goal: Plan of Care/Patient Progress Review  Outcome: No Change  VSS, using CPAP overnight. Telemetry in place (A-Fib). A&O. Up with 1 assist and walker to bedside commode. Moderate Consistent CHO diet with FR of 1500 mL's. No complaints of nausea, no emesis. Blood sugars 105 and 101; no SC NovoLOG coverage needed. Heparin locked to triple lumen PICC; device positional. Voided 1500 mL's spontaneously after receiving a dose of Lasix 20 mg IV. One medium loose stool at HS. Denies pain. Dressings to BLE's CDI.

## 2017-10-06 NOTE — PLAN OF CARE
Problem: Patient Care Overview  Goal: Plan of Care/Patient Progress Review  Pt just arrived on unit from  on . Pt was able to transfer to bed from  with walker and 2 assist. Alert and oriented, denied any pain, very pleasant.

## 2017-10-06 NOTE — PROGRESS NOTES
Care Coordinator- Discharge Planning Note     Admission Date/Time:  9/23/2017  Attending MD:  Janiya Niño MD     Data  Chart reviewed, discussed with interdisciplinary team.   Patient was admitted for:   1. PAD (peripheral artery disease) (H)    2. Generalized muscle weakness    3. Atrial fibrillation, unspecified type (H)    4. Chest pain, unspecified    5. Fall from chair, initial encounter         RNCC Intervention: working with [facility] [phone] to get approved for CPAP.  Patient will be supplied at CPAP when it can be medically billed.      Plan  Anticipated Discharge Date:  TBD  Anticipated Discharge Plan:  TCU    CTS Handoff completed: murphy Jama, RN, BSN, PHN, RNCCPH: [phone]  Pager: [phone]  Covering for : Candi Calderon, Medicine RNCC

## 2017-10-06 NOTE — PLAN OF CARE
Problem: Patient Care Overview  Goal: Plan of Care/Patient Progress Review  Discharge Planner OT   Patient plan for discharge: TCU  Current status: Decreased activity tolerance with standing ADLS, MinAx2 STS, CGA in standing position, ModAx2 for bed mobility. Pt irritable and agitated when encouraged to increase IND in cleaning татьяна area.   Barriers to return to prior living situation: Decreased ind with ADLS, fall risk   Recommendations for discharge: TCU  Rationale for recommendations: Increased activity tolerance in standing position with functional tasks        Entered by: Anne Marie Davis 10/06/2017 5:01 PM

## 2017-10-06 NOTE — PROGRESS NOTES
Social Work Services Progress Note    Hospital Day: 14  Date of Initial Social Work Evaluation:  9/23/17  Collaborated with:   TCU liaxavi Duggan, medical team Maria Conti    Data:  Pt is a 67-year-old male admitted following a fall and CP/SOB on 9/23.     Intervention:  Spoke with  TCU liaison Urban. Urban states that  TCU can likely accept pt over the weekend. No bed capacity today. Pt will need bariatric stretcher at discharge.     Assessment:  Likely discharge this weekend    Plan:    Anticipated Disposition:  Facility:  TCU    Barriers to d/c plan:  Bed availability    Follow Up:   to follow for discharge planning    AARON Butt, LGSW  5A Unit   Pager [phone]  Phone [phone]

## 2017-10-06 NOTE — PLAN OF CARE
Problem: Patient Care Overview  Goal: Plan of Care/Patient Progress Review  Edema 5A: Recommend continued use of compression wraps to B LEs for further management of LE edema and promote wound healing. Reapplication of GCB from MTPs to knee creases. Remove wraps if causing pain/numbness or become soiled.

## 2017-10-06 NOTE — PLAN OF CARE
Problem: Patient Care Overview  Goal: Plan of Care/Patient Progress Review  Outcome: Improving  Afebrile, VSS, alert and oriented x4.  Denies pain and nausea.    and 123, no insulin needed.  Triple lumen Picc heparin locked.  Up to commode with assist of 1 and walker.  Dressings changed, CDI.  Appetite fair.  Continue plan of care.

## 2017-10-06 NOTE — PROGRESS NOTES
Transfer  Transferred to: 5A  Via: wheelchair  Reason for transfer: Pt no longer appropriate for 6B- improved patient condition  Family: Aware of transfer  Belongings: Packed and sent with pt  Chart: Delivered with pt to next unit  Medications: Meds received from old unit with pt  Report given to: Fang GERONIMO  Pt status: VSS on RA

## 2017-10-06 NOTE — PLAN OF CARE
Problem: Patient Care Overview  Goal: Plan of Care/Patient Progress Review  PT 5A: Attempted treatment session, pt desires to hold on PT until wound cares/bandages are changed and lymphedema wraps are replaced. PT explained to pt that therapist may not be able to return for treatment session today - pt is aware of this and accepting. Will re-attempt as scheduling permits.

## 2017-10-06 NOTE — PROGRESS NOTES
Brief  Note- late entry    Pt accepted to [facility] when a bed is available. No bed available today.    AARON Romero, [facility]  6B Intermediate Care Unit  Phone: [phone]  Pager: [phone]

## 2017-10-06 NOTE — PLAN OF CARE
Problem: Patient Care Overview  Goal: Plan of Care/Patient Progress Review  Outcome: Improving  Assumed care of pt at 1500, pt transferred to  at approximately 1835. VSS on RA, occasional desat with eating which pt refuses to believe is accurate. Denies pain. Up with 2 assist with walker to BSC. BM x1. Consistent carb diet with 1.5L FR. Bilat LEs with lymph wraps in place. Continue with POC and notify MD of any changes.

## 2017-10-06 NOTE — PROGRESS NOTES
"Palliative Care Inpatient Clinical Social Work Assessment    Patient Information:  Clarke is a 67 year old man with obesity, afib, diabetes, and untreated depression, among other medical conditions. He was admitted on 9/23 after a fall. He has been on BiPAP.     Summary of Visit: Briefly met with Clarke today to establish relationship and offer follow up services once he discharges to TCU. He shared his appreciation for medical staff and describes his mood as \"manic\" upon arriving in the hospital. He describes \"manic\" as being overcome with gratitude. He shared that today has been difficult for him for no apparent reason and that he is returning to his \"normal self,\" which he describes as \"depressed and hopeless.\" He states that his emotional and maturity is that of an 8 year old and has been his whole adult life.  He shared about previous hospitalizations, including one that appears to have been rather traumatic for him. He was nervous about being admitted this time due to his previous traumatic experience.  He feels very grateful that this experience has been nothing like the previous one.   He mentioned he has also been hospitalized for mental health, which was not further explored today.  He expressed interest in follow up visits once at TCU.    Interventions: Reflective listening, strengths-based approach, assessment, establishing rapport    Assessment: Clarke endorses low mood today. He shared several times that he has \"never experienced this kind of kindness before, a type that you would envy, but am grateful to receive.\"  Shared he expresses himself through hyperboles.     Plan: I will follow up with assessment/interventions as indicated above.    Edita Mendoza, AARON, [facility]  Palliative Care Clinical Social Work Fellow  Pager: [phone]                           "

## 2017-10-06 NOTE — PROGRESS NOTES
INTERNAL MEDICINE PROGRESS NOTE    Clarke Moreira ([id number]) admitted on 9/23/2017  10/06/2017    Assessment & Plan:  Clrake Sotelo is a 67 year old male with a history notable for morbid obesity, atrial fibrillation, HFrEF, diabetes, PVD, and untreated depression that was admitted following a fall and CP/SOB on 9/23.  Transferred to MICU on 9/24 for AMS with acute hypoxic/hypercarbic respiratory failure and hypotension requiring initiation of BiPAP and pressors.  Much improved following nasal airway and BiPAP, off pressors and transferred to general medicine for further management.     #Acute on Chronic Hypercapnic Respiratory Failure  #Evidence of Pulmonary Hypertension  #Probable Obesity hypoventilation syndrome  Developed AMS, A fib with RVR to 150-160s and respiratory distress early AM 9/24/17. Sudden decompensated respiratory status could have been secondary to flash pulmonary edema secondary to a-fib with RVR, exacerbation of an obstructive or restrictive lung pathology, infectious causes (although infectious respiratory panel negative) and worsening of obesity hypoventilation syndrome. ABGs demonstrated pH 7.14, CO2 113. Following BiPAP and airway management with nasal trumpet, his ABG improved with pH 7.29, CO2 66. No concerns for hypoxemia. Mental status improved significantly once pCO2 in the 60s range. However, when off BiPAP for prolonged time he re accumulates CO2 and becomes lethargic. Echo with mild reduction of RV function, could be secondary to underlying Pulmonary HTN in the setting of chronic obstructive or restrictive lung disease with OHS, he has  BMI of 62. CXR from 10/04/17 with some increase in pulmonary congestion.   - Continue CPAP at night, needs to be on at the latest 2200  - Goal O2 to keep saturations > 88%  - CPAP in the evening with VBG check at midnight  - Started DuoNebs QID  - PT following as pt sedentary   - Continue with lasix as below  - Incentive spirometry      #Atrial fibrillation with RVR - improving  History of A fib in the past, not anticoagulated at home but on metoprolol for rate control. CHADS-VaSC 5. Received loading dose of amiodarone and started on amiodarone load and 6hr drip on arrival to MICU when in afib with RVR and hypotension. However, his atrial fibrillation improved once respiratory status was stabilized. Rate improved with increase metoprolol RVR could also be driven by volume overload.    - Continue metoprolol 200mg PO XL  - Started diltiazem 30mg PO BID  - Continue warfarin    #Hypercapnic Encephalopathy - improving  Acute mental status change early AM 9/24 felt to be secondary to acute hypercarbic respiratory failure and acute respiratory acidosis. Mental status has improved once pCO2 in the 60s with BiPAP and now on night time CPAP.  - Monitor mental status  - VBG if mental status change  - CO2 goal of ~60s       #History of HFrEF   #Pulmonary Edema  #CHF exacerbation  History of coronary artery disease/peripheral vascular disease per patient on admission. Last Echo 2008 with EF 40-45%, but on 09/24/17 recheck was described as normal. There might be a diastolic component and most suitable be HFpEF. He is on lisinopril, HCTZ, Metoprolol at home. During respiratory decompensation he did have CXR showing pulmonary edema. Which could secondary to HF exacerbation in the setting of atrial fibrillation. RV mildly reduced, IVC 3.34 without respiratory variability.  CXR from 09/26/17 with improving vascular congestion. Admission weight ~ 460lbs and dry weight around 415-420 lbs. Now 415-424lbs.   - On oral lasix 80mg PO  - Target net -1 to 1.5L, will target net neutral  - BMP BID      #Peripheral Vascular Disease  #Venous Stasis   Patient reported worsening numbness/loss of sensation in his feet bilaterally over the last week, acutely worsening since admission. No signs of limb ischemia, but US did reveal anterior tibial artery and dorsalis pedis  "occlusion. Sensation might be an issue of diabetic neuropathy as well.  - Continue with right leg elevation  - VICK with toe pressures as outpatient, lymphedema wraps  - Continue PT work up  - Monitor for signs of acute leg ischemia    #Depression  History of Depression and PTSD with limited social support. Declined therapy and in hospital chaplaincy services. No antidepressants at home. Ongoing concern for recent DNR/DNI code status change during this hospitalization with expressions of passive suicidality - wanting to avoid BiPap even though he understands it is a life sustaining treatment. Reports - \"the life that I live is not a life worth living.\" Psychiatry evaluated patient and did not believe depression was factoring on DNI/DNR and possible comfort care decision. Palliative care is involved and at this moment patient is agreeable to continue management and optimization of respiratory status as it is improving, but is still considering discontinuing BiPAP.   - Continue address goals of care with patient  - Palliative care following    #Lower Extremity Cellulitis - improving  Bilateral lower extremity with stasis dermatitis and ulcers. Area with some tenderness. Erythema and warmth has improve.  Started on cephalosporin on 09/23/17, completed oral dose of keflex. Leg pain and wound improving as diuresing.    - WOC and lymphedema following    #Perisplenic Free Fluid  Noted on CT Chest in the ED 9/24, splenic laceration and perisplenic fluid collection following fall. Hgb has remained stable, no increase in abdominal distension, no abdominal pain. Per surgery, as hemoglobin stable for 24 hours post-fall, can restart anticoagulation.  - Monitor, daily abdominal exams, no tenderness or signs of acute abdomen  - Intermittent CBC  - Low threshold to reimage if concern for splenic hemorrhage   - General surgery consulted, appreciate recs as above  - Started patient on warfarin    #Dyspnea  #Atypical Chest Pain, " improving  Developed left sided chest pain and shortness of breath after fall from chair. History of CAD/PVD per patient, not documented in EMR. Troponins x3 negative, D-dimer elevated but CT PE study negative for embolism. Pain not improved with nitroglycerin. CK negative, rhabdo unlikely (normal renal function, potassium). Attributable to musculoskeletal at this point with dyspnea likely from HF exacerbation in setting of dilated IVC without variability with respirations.  - Cardio respiratory monitoring  - Continue PTA lipitor   - Continue ASA 81mg q day  - daily diuresis as above  - Metoprolol as above       #Hypertension   On lisinopril at home given history of hypertension and DMII. Creatinine normal on admission. Now with improved hypotension.  - Continue lisinopril 10 mg q day as not on pressors      #Polycythemia   Hgb 17.2 on admission, peaked at 18.6. Could be secondary to hemoconcentration. Now normalized.       #DMII  On Metformin at home. HgbA1c 6.5 on 8/14/17.  - Low dose SSI      #Hypothyroidism  TSH on admission 3.11, on Synthroid 150 mcg at home  - Continue synthroid 150 mcg       #Hyponatremia, resolved.  - Will continue to monitor sodium as rate of correction appropriate in the past 48 hours.  - Will continue with diuresis  - Daily CMP  - Continue to follow, expect to improve with diuresis     #Hypophosphatemia  Could be secondary to increase respiratory effort.   - Replace PO4  - Monitor improvement      FEN: CHO consistent   Prophylaxis: DVT warfarin  Disposition: Pending TCU placement. Patient does not have COPD and due to body habitus he has thoracic restriction disorder and therefore needs CPAP.   Venous Blood Gas    Recent Labs  Lab 09/30/17  0715   PHV 7.40   PCO2V 79*   PO2V 35   HCO3V 49*   CAR 18.2   O2PER 30.0     Code Status: DNR/DNI    Family: POC is Janice Hillman [phone].    Roberto Alexander MD PhD  Internal Medicine PGY2  [phone]    Patient was seen and discussed with  who  "agrees with above assessment and plan.    ==================================================================    Interval HistorySubjective:  No overnight events. No overnight events. Cooperative and doing PT.     Objective:  Most recent vital signs:  BP 97/57 (BP Location: Left arm)  Pulse 114  Temp 98.9  F (37.2  C) (Oral)  Resp 18  Ht 1.803 m (5' 11\")  Wt (!) 189 kg (416 lb 10.7 oz)  SpO2 95%  BMI 58.11 kg/m2  Temp:  [97.7  F (36.5  C)-98.9  F (37.2  C)] 98.9  F (37.2  C)  Pulse:  [114-124] 114  Heart Rate:  [113-114] 114  Resp:  [16-20] 18  BP: ()/(57-78) 97/57  SpO2:  [89 %-98 %] 95 %  Wt Readings from Last 2 Encounters:   10/06/17 (!) 189 kg (416 lb 10.7 oz)   08/14/17 (!) 191 kg (421 lb)       Intake/Output Summary (Last 24 hours) at 09/26/17 1426  Last data filed at 09/26/17 1400   Gross per 24 hour   Intake             1326 ml   Output             1580 ml   Net             -254 ml       Physical exam:  General: Patient lying comfortably in bed, NAD, wearing BiPAP mask  HEENT: EOMI, PERRL, no scleral icterus or injection, no bruits appreciated, difficult to appreciate JVD due to body habitus, MMM  Cardiac: RRR, no m/r/g appreciated.   Respiratory: reduced lung sounds bilaterally across all fields, no wheezing or crackles  GI: Obese, NT, no organomegaly, no signs of acute abdomen  Extremities: +2 edema bilaterally, absent pulses in right dorsalis pedis and anterior tibialis, signs of digital ischemia vs chronic fungi infection   Neuro: AOx3,  Moving all extremities sponteneously    Labs:  Results for orders placed or performed during the hospital encounter of 09/23/17 (from the past 24 hour(s))   Glucose by meter   Result Value Ref Range    Glucose 105 (H) 70 - 99 mg/dL   Basic metabolic panel   Result Value Ref Range    Sodium 134 133 - 144 mmol/L    Potassium 4.3 3.4 - 5.3 mmol/L    Chloride 94 94 - 109 mmol/L    Carbon Dioxide 31 20 - 32 mmol/L    Anion Gap 9 3 - 14 mmol/L    Glucose 97 70 - 99 " mg/dL    Urea Nitrogen 8 7 - 30 mg/dL    Creatinine 0.68 0.66 - 1.25 mg/dL    GFR Estimate >90 >60 mL/min/1.7m2    GFR Estimate If Black >90 >60 mL/min/1.7m2    Calcium 8.8 8.5 - 10.1 mg/dL   Glucose by meter   Result Value Ref Range    Glucose 101 (H) 70 - 99 mg/dL   Glucose by meter   Result Value Ref Range    Glucose 103 (H) 70 - 99 mg/dL   INR   Result Value Ref Range    INR 1.91 (H) 0.86 - 1.14   Basic metabolic panel   Result Value Ref Range    Sodium 137 133 - 144 mmol/L    Potassium 4.2 3.4 - 5.3 mmol/L    Chloride 95 94 - 109 mmol/L    Carbon Dioxide 37 (H) 20 - 32 mmol/L    Anion Gap 6 3 - 14 mmol/L    Glucose 101 (H) 70 - 99 mg/dL    Urea Nitrogen 6 (L) 7 - 30 mg/dL    Creatinine 0.66 0.66 - 1.25 mg/dL    GFR Estimate >90 >60 mL/min/1.7m2    GFR Estimate If Black >90 >60 mL/min/1.7m2    Calcium 8.8 8.5 - 10.1 mg/dL   Magnesium (AM Draw)   Result Value Ref Range    Magnesium 2.1 1.6 - 2.3 mg/dL   Glucose by meter   Result Value Ref Range    Glucose 128 (H) 70 - 99 mg/dL   Glucose by meter   Result Value Ref Range    Glucose 123 (H) 70 - 99 mg/dL

## 2017-10-07 ENCOUNTER — APPOINTMENT (OUTPATIENT)
Dept: OCCUPATIONAL THERAPY | Facility: CLINIC | Age: 67
DRG: 291 | End: 2017-10-07
Payer: COMMERCIAL

## 2017-10-07 LAB
ANION GAP SERPL CALCULATED.3IONS-SCNC: 5 MMOL/L (ref 3–14)
BUN SERPL-MCNC: 7 MG/DL (ref 7–30)
CALCIUM SERPL-MCNC: 8.6 MG/DL (ref 8.5–10.1)
CHLORIDE SERPL-SCNC: 98 MMOL/L (ref 94–109)
CO2 SERPL-SCNC: 37 MMOL/L (ref 20–32)
CREAT SERPL-MCNC: 0.69 MG/DL (ref 0.66–1.25)
GFR SERPL CREATININE-BSD FRML MDRD: >90 ML/MIN/1.7M2
GLUCOSE BLDC GLUCOMTR-MCNC: 103 MG/DL (ref 70–99)
GLUCOSE BLDC GLUCOMTR-MCNC: 105 MG/DL (ref 70–99)
GLUCOSE BLDC GLUCOMTR-MCNC: 109 MG/DL (ref 70–99)
GLUCOSE BLDC GLUCOMTR-MCNC: 115 MG/DL (ref 70–99)
GLUCOSE BLDC GLUCOMTR-MCNC: 134 MG/DL (ref 70–99)
GLUCOSE BLDC GLUCOMTR-MCNC: 94 MG/DL (ref 70–99)
GLUCOSE SERPL-MCNC: 93 MG/DL (ref 70–99)
INR PPP: 2.03 (ref 0.86–1.14)
MAGNESIUM SERPL-MCNC: 2 MG/DL (ref 1.6–2.3)
POTASSIUM SERPL-SCNC: 3.9 MMOL/L (ref 3.4–5.3)
SODIUM SERPL-SCNC: 139 MMOL/L (ref 133–144)

## 2017-10-07 PROCEDURE — 40000133 ZZH STATISTIC OT WARD VISIT

## 2017-10-07 PROCEDURE — 25000132 ZZH RX MED GY IP 250 OP 250 PS 637: Performed by: STUDENT IN AN ORGANIZED HEALTH CARE EDUCATION/TRAINING PROGRAM

## 2017-10-07 PROCEDURE — 25000132 ZZH RX MED GY IP 250 OP 250 PS 637: Performed by: INTERNAL MEDICINE

## 2017-10-07 PROCEDURE — 36592 COLLECT BLOOD FROM PICC: CPT | Performed by: INTERNAL MEDICINE

## 2017-10-07 PROCEDURE — 80048 BASIC METABOLIC PNL TOTAL CA: CPT | Performed by: INTERNAL MEDICINE

## 2017-10-07 PROCEDURE — 85610 PROTHROMBIN TIME: CPT | Performed by: INTERNAL MEDICINE

## 2017-10-07 PROCEDURE — 25000128 H RX IP 250 OP 636: Performed by: INTERNAL MEDICINE

## 2017-10-07 PROCEDURE — 00000146 ZZHCL STATISTIC GLUCOSE BY METER IP

## 2017-10-07 PROCEDURE — 83735 ASSAY OF MAGNESIUM: CPT | Performed by: INTERNAL MEDICINE

## 2017-10-07 PROCEDURE — 40000275 ZZH STATISTIC RCP TIME EA 10 MIN

## 2017-10-07 PROCEDURE — 40000275 ZZH STATISTIC RCP TIME EA 10 MIN: Performed by: OPTOMETRIST

## 2017-10-07 PROCEDURE — 40000802 ZZH SITE CHECK

## 2017-10-07 PROCEDURE — 12000008 ZZH R&B INTERMEDIATE UMMC

## 2017-10-07 PROCEDURE — 99231 SBSQ HOSP IP/OBS SF/LOW 25: CPT | Mod: GC | Performed by: INTERNAL MEDICINE

## 2017-10-07 PROCEDURE — 94660 CPAP INITIATION&MGMT: CPT

## 2017-10-07 PROCEDURE — 25000132 ZZH RX MED GY IP 250 OP 250 PS 637: Performed by: HOSPITALIST

## 2017-10-07 PROCEDURE — 97140 MANUAL THERAPY 1/> REGIONS: CPT | Mod: GO

## 2017-10-07 RX ORDER — DILTIAZEM HYDROCHLORIDE 30 MG/1
30 TABLET, FILM COATED ORAL 2 TIMES DAILY
Qty: 60 TABLET | Refills: 3 | Status: ON HOLD | DISCHARGE
Start: 2017-10-07 | End: 2017-10-23

## 2017-10-07 RX ORDER — IPRATROPIUM BROMIDE AND ALBUTEROL SULFATE 2.5; .5 MG/3ML; MG/3ML
3 SOLUTION RESPIRATORY (INHALATION) EVERY 4 HOURS PRN
Qty: 360 ML | Refills: 3 | Status: ON HOLD | DISCHARGE
Start: 2017-10-07 | End: 2017-10-23

## 2017-10-07 RX ORDER — FUROSEMIDE 80 MG
80 TABLET ORAL DAILY
Qty: 90 TABLET | Refills: 3 | Status: ON HOLD | DISCHARGE
Start: 2017-10-07 | End: 2017-10-27

## 2017-10-07 RX ORDER — METOPROLOL SUCCINATE 100 MG/1
200 TABLET, EXTENDED RELEASE ORAL DAILY
Qty: 30 TABLET | Refills: 12 | Status: ON HOLD | DISCHARGE
Start: 2017-10-07 | End: 2017-10-27

## 2017-10-07 RX ORDER — TAMSULOSIN HYDROCHLORIDE 0.4 MG/1
0.4 CAPSULE ORAL DAILY
Qty: 60 CAPSULE | Refills: 1 | DISCHARGE
Start: 2017-10-07 | End: 2017-10-30

## 2017-10-07 RX ORDER — WARFARIN SODIUM 1 MG/1
9 TABLET ORAL DAILY
Qty: 30 TABLET | Refills: 3 | Status: ON HOLD | DISCHARGE
Start: 2017-10-07 | End: 2017-10-27

## 2017-10-07 RX ADMIN — DILTIAZEM HYDROCHLORIDE 30 MG: 30 TABLET, FILM COATED ORAL at 08:45

## 2017-10-07 RX ADMIN — SODIUM CHLORIDE, PRESERVATIVE FREE 5 ML: 5 INJECTION INTRAVENOUS at 09:57

## 2017-10-07 RX ADMIN — POTASSIUM CHLORIDE 40 MEQ: 1.5 POWDER, FOR SOLUTION ORAL at 08:45

## 2017-10-07 RX ADMIN — POLYETHYLENE GLYCOL 3350 17 G: 17 POWDER, FOR SOLUTION ORAL at 08:46

## 2017-10-07 RX ADMIN — LEVOTHYROXINE SODIUM 150 MCG: 25 TABLET ORAL at 08:45

## 2017-10-07 RX ADMIN — METOPROLOL SUCCINATE 200 MG: 200 TABLET, EXTENDED RELEASE ORAL at 08:44

## 2017-10-07 RX ADMIN — MULTIPLE VITAMINS W/ MINERALS TAB 1 TABLET: TAB at 08:45

## 2017-10-07 RX ADMIN — LISINOPRIL 10 MG: 10 TABLET ORAL at 08:45

## 2017-10-07 RX ADMIN — Medication 9 MG: at 18:23

## 2017-10-07 RX ADMIN — ASPIRIN 81 MG CHEWABLE TABLET 81 MG: 81 TABLET CHEWABLE at 08:44

## 2017-10-07 RX ADMIN — POTASSIUM CHLORIDE 40 MEQ: 1.5 POWDER, FOR SOLUTION ORAL at 20:51

## 2017-10-07 RX ADMIN — FUROSEMIDE 80 MG: 40 TABLET ORAL at 08:44

## 2017-10-07 RX ADMIN — SODIUM CHLORIDE, PRESERVATIVE FREE 5 ML: 5 INJECTION INTRAVENOUS at 07:21

## 2017-10-07 RX ADMIN — TAMSULOSIN HYDROCHLORIDE 0.4 MG: 0.4 CAPSULE ORAL at 08:45

## 2017-10-07 RX ADMIN — ATORVASTATIN CALCIUM 40 MG: 40 TABLET, FILM COATED ORAL at 08:44

## 2017-10-07 RX ADMIN — Medication 1 G: at 08:44

## 2017-10-07 RX ADMIN — DILTIAZEM HYDROCHLORIDE 30 MG: 30 TABLET, FILM COATED ORAL at 20:51

## 2017-10-07 NOTE — PLAN OF CARE
"Problem: Patient Care Overview  Goal: Plan of Care/Patient Progress Review  Shift [id number]. Patient remains A&O, pleasant and up X2 gait belt and walker. Coordinated with lymphedema wound care on bilat LL; ordered more supplies. Pt c/o urinary retention in morning PVR 72 after 75ml void- post lymph wrap patient able to void 525ml followed by 1175ml on own relieving symptoms of retention. Patient revealed, \"I usually don't like people and keep to myself but since I've been here I've been a chatty Laura, you all are so kind, thank you.\" Plan for [facility] tomorrow ride at 2pm.          "

## 2017-10-07 NOTE — PLAN OF CARE
Problem: Patient Care Overview  Goal: Plan of Care/Patient Progress Review  Edema/5A: Wraps on upon arrival, reporting comfort with fit. Coordinated with RN for wound cares. Pt with 1+ pitting throughout, skin continues to present with significant redness on anterior shins and rough patches of skin around ankles. Pt reporting increased sensation in LEs. Skin cares performed prior to reapplication of GCB with sween 24 from MTPs to knee creases for continued edema management and protection of skin integrity. Okay to wear x 24 hours until therapist returns. Please remove compression if causing numbness, tingling, pain, or becomes soiled.

## 2017-10-07 NOTE — PROGRESS NOTES
Social Work Services Progress Note    *All updates detailed at the end of this note*    Hospital Day: 14  Date of Initial Social Work Evaluation:  9/2/3/17; please reference for details.   Collaborated with:  Bedside RN; chart reviewed; [facility].    Data:    Spoke with bedside RN and confirmed patient is stable to d/c today if bed is available.    Intervention:    Spoke with [facility] Admissions Liaison ([phone]) at [facility]; unfortunately there is no bed available today.  Patient is #2 on the waiting list; will wait to hear back about hopeful bed for tomorrow, [facility] 10/8.     Bedside RN updated patient; MERVAT paged Resident MD ([phone]) to notify.  PAS has already been completed in prep for d/c: [id number].    Assessment:    Significant relationship at present time:  Patient as indicated he has no family.   Family of origin is available for support:  No.   Other support available:  Friends, Janice Hillman and Dre Lucas.   Gaps in support system:  Minimal support.   Patient is caregiver to:  NA      Plan:  Anticipated Disposition:  Facility:  Accepted to  TCU; awaiting open bed. Will need bariatric stretcher transport at d/c.   Barriers to d/c plan:  Bed availability on  TCU.  Follow Up:  Will continue to follow to assist in finalizing d/c plans.     AARON Schmid, [facility]  Saturday   Pager: [phone]    Addendum:  Spoke again with [facility] Ad[facility] Liaison ([id number]) and notified that bed will officially be available for patient tomorrow, [facility] 10/8.  They are requesting a 2:00pm pick-up time to allow time to order bariatric bed/equipement at TCU.    [facility] ([phone]) scheduled for 2:00pm tomorrow.   PCS form completed and left on front of paper chart for drivers.     Bedside RN and Charge RN updated; page out to Resident MD to notify.   Will have Sunday SW f/u tomorrow to further finalize d/c details.     AARON Schmid, [facility]  Saturday Social  Worker  Pager: [phone]

## 2017-10-07 NOTE — DISCHARGE SUMMARY
Discharge Summary     Clarke Moreira MRN# [id number]   YOB: 1950 Age: 67 year old      Primary Care Physician:   Matthias Sanchez  Admitting Attending Physician:            Demetra Stout MD  Discharge Attending Physician:             Janiya Niño MD   Discharge Resident:   Roberto Alexander, PGY1  Discharging Service:   Internal Medicine      Date of Admission: 9/23/2017  Date of Discharge:        10/08/17      Discharge Diagnoses:  Acute on chronic hypercapnic respiratory failure  Probable pulmonary hypertension  OHS  THONG  Atrial fibrillation with RVR  Metabolic encephalopathy secondary to hypercapnia  Pulmonary edema  ?HFpEF  Peripheral vascular disease  Venous stasis  Depression  Lower extremity cellulitis   Dyspnea  Atypical chest pain  HTN  DM2  Hx of hypothyroidism  Hyponatremia  Hypophosphatemia  Perisplenic free fluid    FOLLOW UP PLAN    1. The patient needs to follow up with his PCP in 1 week after discharge from TCU:  - Following lab tests are recommended: BMP  - Following imaging studies are needed: CXR  - Following labs are pending at discharge: none    2. The patient needs to follow up with Psychiatry, Cardiology, Vascular surgery, 1 and 2 weeks:  - Following lab tests are recommended: VICK, BMP  - Following imaging studies are needed: NONE  - Following labs are pending at discharge: NONE    Labs/Procedures/imaging with results:    CMP    Recent Labs  Lab 10/07/17  0726 10/06/17  2149 10/06/17  0903 10/05/17  2245    136 137 134   POTASSIUM 3.9 4.3 4.2 4.3   BUN 7 9 6* 8   CR 0.69 0.80 0.66 0.68     CBC    Recent Labs  Lab 10/05/17  0348 10/02/17  0908    192     INR    Recent Labs  Lab 10/07/17  0726   INR 2.03*     Lab Results   Component Value Date    TROPI <0.015 09/24/2017    TROPI <0.015 09/23/2017    TROPI 0.015 09/23/2017    TROPI <0.012 02/27/2008    TROPI <0.012 02/27/2008    TROPONIN 0.00 09/23/2017    TROPONIN 0.02 02/26/2008     TROPONIN <0.04 01/20/2007    TROPONIN 0.05 01/19/2007    TROPONIN 0.04 01/19/2007     Significant Results and Procedures   Results for orders placed or performed during the hospital encounter of 09/23/17   XR Chest Port 1 View    Narrative    Exam: Chest x-ray, 1 view, 9/22/2017.    COMPARISON: 2/26/2008.    HISTORY: Chest pain.    FINDINGS: Single view of the chest was obtained. Low lung volumes.  Patchy mixed interstitial and airspace opacities bilaterally.  Prominent pulmonary vasculature. Tortuous and dilated ascending  thoracic aorta with calcifications. Cardiac mediastinal silhouette at  the upper limits of normal. Small bilateral pleural effusions. No  pneumothorax.      Impression    IMPRESSION:  1. Patchy mixed opacities bilaterally, pulmonary edema versus  infection versus atelectasis.  2. Small bilateral pleural effusions.    BETTIE LEDESMA MD   CT Chest Pulmonary Embolism w Contrast    Narrative    EXAMINATION: CT CHEST PULMONARY EMBOLISM W CONTRAST, 9/23/2017 2:25 PM      COMPARISON: None.    HISTORY: Shortness of breath, hypoxia.    TECHNIQUE: Volumetric helical acquisition of CT images of the chest  from the lung apices to the kidneys were acquired after the  administration of 80 mL of Isovue-370 IV contrast. Three-dimensional  (3D) post-processed angiographic images were reconstructed, archived  to PACS and used in interpretation of this study.    FINDINGS: There is no pulmonary embolism. Enlarged main pulmonary  artery measuring 4.2 cm, previously measuring 3.7 cm, concerning for  pulmonary arterial hypertension. The heart is not enlarged.  No  pericardial effusions. The thoracic aorta is atherosclerotic without  evidence of dissection or aneurysm. Mild atherosclerotic lesions of  the great vessels and coronary arteries.    Trace left pleural effusion. Small to moderate right pleural effusion.   There is no pneumothorax.     Central tracheobronchial tree is patent. Prominent fat within  the  mediastinum. Subcentimeter mediastinal lymph nodes measuring up to 9  mm, not enlarged by size criteria. No axillary lymphadenopathy. No  hilar lymphadenopathy. Moderate bilateral gynecomastia.    LUNGS: Bilateral lower lobes atelectasis, right greater them left.  Mild atelectasis in the right middle lobe and left lingula. No  suspicious pulmonary nodules.    Upper abdomen: Biliary sludge and/or small stones within the  gallbladder. Small perisplenic free fluid. Small sliding hiatal  hernia. Unremarkable adrenal glands. Hepatic steatosis. Evaluation is  limited due to the patient body habitus.    Bones: Degenerative changes of the spine. No suspicious lesions in the  bones.       Impression    IMPRESSION:   1. No definite pulmonary embolism demonstrated. Enlarged main  pulmonary artery slightly increased in diameter from the prior study,  concerning for pulmonary arterial hypertension.  2. Small to moderate right pleural effusion. Trace left pleural  effusion. Bilateral lower lobes atelectasis.    BETTIE LEDESMA MD   XR Chest Port 1 View    Narrative    Exam:  XR CHEST PORT 1 , 9/24/2017 7:47 AM    History: Acute hypoxic hypercarbic respiratory failure    Comparison:  Chest radiograph from 9/23/2017, and chest CT from the  23rd 2017.    Findings:  Single AP view of chest. Low lung volumes. The  cardiomediastinal silhouette is within normal limits given technique.  Unchanged small bilateral layering pleural effusions and associated  basilar opacities. Stable bilateral opacities. No pneumothorax.  Visualized upper abdomen and osseous structures are unremarkable.      Impression    Impression:  Stable small bilateral pleural effusions and basilar  predominant opacities, likely atelectasis.    I have personally reviewed the examination and initial interpretation  and I agree with the findings.    ARCHIE HI MD ([place])   XR Chest Port 1 View    Narrative    Exam:  XR CHEST PORT 1 VW, 9/24/2017 9:56  AM    History: RN placed PICC - verify tip placement    Comparison:  Chest radiograph from 0741 hours on 9/24/2017.    Findings:  Single AP view of the chest. Right upper extremity PICC tip  projects over the high SVC. Stable enlargement of the  cardiomediastinal silhouette which may be related to technique.  Improved small bilateral layering pleural effusions and adjacent  basilar opacities. Unchanged hazy perihilar and mixed interstitial and  airspace opacities. Visualized upper abdomen and osseous structures  are unremarkable.      Impression    Impression:    1. Right upper extremity PICC tip projects over the high SVC.  2. Improved small bilateral pleural effusions and adjacent basilar  atelectasis and/or consolidation.  3. Stable prominent cardiomediastinal silhouette and pulmonary  vascular congestion.     I have personally reviewed the examination and initial interpretation  and I agree with the findings.    ARCHIE HI MD ([place])   US Lower Extremity Arterial Duplex Bilateral    Narrative    Exam: Duplex ultrasound of bilateral lower extremity arteries dated  9/24/2017 11:40 AM     Clinical information: new pulseless lower extremities, unable to  doppler.     Comparison: None available.    Technique: Includes Gray Scale images, color Doppler, spectral Doppler  waveforms and velocities with appropriate angles of 60 degrees or  less.    Findings:     Right lower extremity:     Common femoral artery: 127 cm/sec. Waveforms: Biphasic  Deep femoral artery: 79 cm/sec. Waveforms: Biphasic  Proximal SFA: 124 cm/sec. Waveforms: Biphasic  Mid SFA: 166 cm/sec. Waveforms: Biphasic  Distal SFA: 115 cm/sec. Waveforms: Biphasic  Popliteal artery: 73 cm/sec. Waveforms: Biphasic  PTA ankle: 14 cm/sec. Waveforms: Monophasic high resistance  NIKKI ankle: Anterior tibial artery and dorsalis pedis artery not  identified at the ankle.    Left lower extremity:    Common femoral artery: 129 cm/sec. Waveforms: Biphasic  Deep femoral  artery: 84 cm/sec. Waveforms: Biphasic  Proximal SFA: 116 cm/sec. Waveforms: Biphasic  Mid SFA: 137 cm/sec. Waveforms: Biphasic  Distal SFA: 150 cm/sec. Waveforms: Biphasic  Popliteal artery: 83 cm/sec. Waveforms: Biphasic  PTA ankle: 11 cm/sec. Waveforms: Biphasic  NIKKI ankle: 18 cm/sec. Waveforms: Biphasic      Impression    Impression:   1. Right leg: The anterior tibial artery at the ankle and dorsalis  pedis are not identified and may be occluded. The remainder of the  arteries of the right lower extremity are patent with triphasic  waveforms to the level of the popliteal artery.  2. Left leg: The arteries of the left lower extremity are patent with  triphasic waveforms.    Guidelines:  [facility] duplex criteria for lower limb arterial  occlusive disease  -Percent stenosis- Normal (1-19%): Peak systolic velocity (cm/s):  <150, End-diastolic velocity (cm/s): <40, Velocity ration (Vr): <1.5,  Distal arterial waveform: Triphasic  -Percent stenosis- 20-49%: Peak systolic velocity (cm/s): 150-200,  End-diastolic velocity (cm/s): <40, Velocity ration (Vr): 1.5-2.0,  Distal arterial waveform: Triphasic  -Percent stenosis- 50-75%: Peak systolic velocity (cm/s): 200-300,  End-diastolic velocity (cm/s): <90, Velocity ration (Vr): 2.0-3.9,  Distal arterial waveform: Poststenotic turbulence distal to stenosis,  monophasic distal waveform  -Percent stenosis- >75%: Peak systolic velocity (cm/s): >300,  End-diastolic velocity (cm/s): <90, Velocity ration (Vr): >4.0, Distal  arterial waveform: Dampened distal waveform and low PSV/EDV* in the  stenosis  -Percent stenosis- Occlusion: Absent flow by color Doppler/pulsed  Doppler spectral analysis; length of occlusion estimated from distance  between exit and reentry collateral arteries  *PSV = peak systolic velocity, EDV = end-diastolic velocity  http://link.adorno.com/chapter/10.1007/[email or web address]/fulltext  html    I have personally reviewed the  examination and initial interpretation  and I agree with the findings.    ARCHIE HI MD ([place])   XR Chest Port 1 View    Narrative    Chest one view portable upright    HISTORY: Shortness of breath    COMPARISON STUDY: 9/24/2017    FINDINGS: Cardiac silhouette is nonenlarged. Pulmonary vascularity is  engorged. Bilateral pleural effusions with bibasilar atelectasis and  consolidation. Right PICC with tip in the mid SVC. Old left-sided rib  fractures.      Impression    IMPRESSION: Pulmonary vascular congestion and bibasilar atelectasis  and/or pneumonia    SOL CRAIG MD   XR Chest Port 1 View    Narrative    Exam: XR CHEST PORT 1 VW, 10/4/2017 9:39 AM    Indication: Follow up CXR on pulmonary edema    Comparison: Chest radiograph 9/26/2017.    Findings:   Single AP view of the chest dated degrees. Right upper extremity PICC  in stable position with tip in the mid SVC. The visualized upper  abdomen is unremarkable. Left-sided rib fractures. Soft tissues are  unremarkable. The trachea is midline. The cardiomediastinal silhouette  is within normal limits. Interval resolution of bilateral pleural  effusions and associated bibasilar atelectasis and consolidation. Mild  interval decrease in pulmonary vascular congestion.      Impression    Impression:   1. Interval resolution bilateral pleural effusions and associated  bibasilar atelectasis and/or consolidation.  2. Mild interval decrease in pulmonary vascular congestion.    I have personally reviewed the examination and initial interpretation  and I agree with the findings.    YADIRA MELO MD       Consultations obtained:   Consultations This Hospital Stay   LYMPHEDEMA THERAPY IP CONSULT  WOUND OSTOMY CONTINENCE NURSE  IP CONSULT  PHYSICAL THERAPY ADULT IP CONSULT  OCCUPATIONAL THERAPY ADULT IP CONSULT  VASCULAR ACCESS CARE ADULT IP CONSULT  VASCULAR ACCESS CARE ADULT IP CONSULT  VASCULAR ACCESS CARE ADULT IP CONSULT  VASCULAR ACCESS ADULT IP CONSULT  VASCULAR  ACCESS ADULT IP CONSULT  SOCIAL WORK IP CONSULT  SURGERY GENERAL ADULT IP CONSULT  VASCULAR SURGERY ADULT IP CONSULT  WOUND OSTOMY CONTINENCE NURSE  IP CONSULT  PSYCHIATRY IP CONSULT  PALLIATIVE CARE ADULT  PSYCHIATRY IP CONSULT  PHARMACY TO DOSE WARFARIN  PHYSICAL THERAPY ADULT IP CONSULT  OCCUPATIONAL THERAPY ADULT IP CONSULT  MEDICATION HISTORY IP PHARMACY CONSULT  NUTRITION SERVICES ADULT IP CONSULT  PHYSICAL THERAPY ADULT IP CONSULT  OCCUPATIONAL THERAPY ADULT IP CONSULT    Brief HPI:  Adapted from H&P on 9/23/2017.  Please refer to it for further details. 66 y/o male with a history notable for morbid obesity, atrial fibrillation, HFrEF, diabetes, PVD, and untreated depression that was admitted following a fall and CP/SOB on 9/23.  Transferred to MICU on 9/24 for AMS with acute hypoxic/hypercarbic respiratory failure and hypotension requiring initiation of BiPAP and pressors.  Much improved following nasal airway and BiPAP, off pressors. Stable after following CPAP schedule.     Hospital Course:   #Acute on Chronic Hypercapnic Respiratory Failure  #Evidence of Pulmonary Hypertension  #Probable Obesity hypoventilation syndrome  Developed AMS, A fib with RVR to 150-160s and respiratory distress early AM 9/24/17. Sudden decompensated respiratory status could have been secondary to flash pulmonary edema secondary to a-fib with RVR, exacerbation of an obstructive or restrictive lung pathology, infectious causes (although infectious respiratory panel negative) and worsening of obesity hypoventilation syndrome. ABGs demonstrated pH 7.14, CO2 113. Following BiPAP and airway management with nasal trumpet, his ABG improved with pH 7.29, CO2 66. No concerns for hypoxemia. Mental status improved significantly once pCO2 in the 60s range. However, when off BiPAP for prolonged time he re accumulates CO2 and becomes lethargic. Echo with mild reduction of RV function, could be secondary to underlying Pulmonary HTN in the  setting of chronic obstructive or restrictive lung disease with OHS, he has  BMI of 62. CXR from 10/04/17 with some decrease in pulmonary congestion. Overall, patient has been doing well as he has been wearing CPAP every night starting at 2200. This strategy has controlled his a-fib. Patient to continue CPAP at night, needs to be on at the latest 2200. Goal O2 to keep saturations > 88%. He is to continue PT following as he has been sedentary.      #Atrial fibrillation with RVR - improving  History of A fib in the past, not anticoagulated at home but on metoprolol for rate control. CHADS-VaSC 5. Received loading dose of amiodarone and started on amiodarone load and 6hr drip on arrival to MICU when in afib with RVR and hypotension. However, his atrial fibrillation improved once respiratory status was stabilized. Rate improved with increase metoprolol RVR could also be driven by volume overload. Patient was also started on diltiazem 30mg daily and tolerated well. INR remained therapeutic with warfarin.     #Hypercapnic Encephalopathy  Acute mental status change early AM 9/24 felt to be secondary to acute hypercarbic respiratory failure and acute respiratory acidosis. Mental status has improved and remains normal after compliance with overnight cpap.       #History of HFrEF   #Pulmonary Edema  #CHF exacerbation  History of coronary artery disease/peripheral vascular disease per patient on admission. Last Echo 2008 with EF 40-45%, but on 09/24/17 recheck was described as normal. There might be a diastolic component and most suitable be HFpEF. He is on lisinopril, HCTZ, Metoprolol at home. During respiratory decompensation he did have CXR showing pulmonary edema. Which could secondary to HF exacerbation in the setting of atrial fibrillation. RV mildly reduced, IVC 3.34 without respiratory variability.  CXR from 09/26/17 with improving vascular congestion. Admission weight ~ 460lbs and dry weight around 415-420 lbs. Now  "415-424lbs. Patient referred to CHF clinic for follow up after discharge. On oral lasix 80mg PO with net neutral output. Recommend rechecking BMP within 1 week and adjusting lasix as needed per kidney function and physical exam/symptoms of dehydration or hypervolemia as needed.     #Peripheral Vascular Disease  #Venous Stasis   Patient reported worsening numbness/loss of sensation in his feet bilaterally over the last week, acutely worsening since admission. No signs of limb ischemia, but US did reveal anterior tibial artery and dorsalis pedis occlusion. Sensation might be an issue of diabetic neuropathy as well. He is to continue with right leg elevation. Vascular surgery would like outpatient VICK and follow up.      #Depression  History of Depression and PTSD with limited social support. Declined therapy and in hospital chaplaincy services. No antidepressants at home. Ongoing concern for recent DNR/DNI code status change during this hospitalization with expressions of passive suicidality - wanting to avoid BiPap even though he understands it is a life sustaining treatment. Reports - \"the life that I live is not a life worth living.\" Psychiatry evaluated patient and did not believe depression was factoring on DNI/DNR and possible comfort care decision. Palliative care is involved and at this moment patient is agreeable to continue management and optimization of respiratory status. No longer with passive suicidal thoughts.     #Lower Extremity CellulitisBilateral lower extremity with stasis dermatitis and ulcers. Area with some tenderness. Erythema and warmth has improve.  Started on cephalosporin on 09/23/17, completed oral dose of keflex. Leg pain and wound improving as diuresing.      #Perisplenic Free Fluid  Noted on CT Chest in the ED 9/24, splenic laceration and perisplenic fluid collection following fall. Hgb has remained stable, no increase in abdominal distension, no abdominal pain. Per surgery, as " hemoglobin stable for 24 hours post-fall, can restart anticoagulation.     #Dyspnea  #Atypical Chest Pain, improving  Developed left sided chest pain and shortness of breath after fall from chair. History of CAD/PVD per patient, not documented in EMR. Troponins x3 negative, D-dimer elevated but CT PE study negative for embolism. Pain not improved with nitroglycerin. CK negative, rhabdo unlikely (normal renal function, potassium). Attributable to musculoskeletal at this point with dyspnea likely from HF exacerbation in setting of dilated IVC without variability with respirations.      #Hypertension   On lisinopril at home given history of hypertension and DMII. Creatinine normal on admission. Now with improved hypotension.      #Polycythemia   Hgb 17.2 on admission, peaked at 18.6. Could be secondary to hemoconcentration. Now normalized.       #DMII  On Metformin at home. HgbA1c 6.5 on 8/14/17.  - Low dose SSI      #Hypothyroidism  TSH on admission 3.11, on Synthroid 150 mcg at home  - Continue synthroid 150 mcg       #Hyponatremia  Might be hypervolemic. Resolved with fluid restriction and diuresis.      #Hypophosphatemia  Could be secondary to increase respiratory effort. Corrected with PO4 replacement      Discharge disposition and destination:  TCU    Discharge Physical Exam:  Temp:  [96.6  F (35.9  C)-98.2  F (36.8  C)] 98.2  F (36.8  C)  Pulse:  [] 97  Heart Rate:  [101] 101  Resp:  [16-20] 16  BP: ()/(48-71) 104/48  FiO2 (%):  [30 %] 30 %  SpO2:  [80 %-97 %] 93 %  Vitals:    10/05/17 0300 10/06/17 1030 10/07/17 1247   Weight: (!) 188.4 kg (415 lb 5.6 oz) (!) 189 kg (416 lb 10.7 oz) (!) 184 kg (405 lb 10.3 oz)     Physical exam:  General: Patient is sitting in chair, NAD, wearing BiPAP mask  HEENT: EOMI, PERRL, no scleral icterus or injection, no bruits appreciated, difficult to appreciate JVD due to body habitus, MMM  Cardiac: RRR, no m/r/g appreciated.   Respiratory: reduced lung sounds  bilaterally across all fields, no wheezing or crackles  GI: Obese, NT, no organomegaly, no signs of acute abdomen  Extremities: +2 edema bilaterally, lymphedema wraps on  Neuro: AOx3,  Moving all extremities sponteneously    Discharge Medications:  Current Discharge Medication List      START taking these medications    Details   ipratropium - albuterol 0.5 mg/2.5 mg/3 mL (DUONEB) 0.5-2.5 (3) MG/3ML neb solution Take 1 vial (3 mLs) by nebulization every 4 hours as needed for wheezing  Qty: 360 mL, Refills: 3    Associated Diagnoses: SOB (shortness of breath)      warfarin (COUMADIN) 1 MG tablet Take 9 tablets (9 mg) by mouth daily  Qty: 30 tablet, Refills: 3    Associated Diagnoses: Atrial fibrillation, unspecified type (H)      diltiazem (CARDIZEM) 30 MG tablet Take 1 tablet (30 mg) by mouth 2 times daily  Qty: 60 tablet, Refills: 3    Associated Diagnoses: Atrial fibrillation, unspecified type (H)      furosemide (LASIX) 80 MG tablet Take 1 tablet (80 mg) by mouth daily  Qty: 90 tablet, Refills: 3    Comments: Take extra dose if weight increase more than 5lbs until weight 415-420lbs.  Associated Diagnoses: Hypervolemia, unspecified hypervolemia type      tamsulosin (FLOMAX) 0.4 MG capsule Take 1 capsule (0.4 mg) by mouth daily  Qty: 60 capsule, Refills: 1    Associated Diagnoses: Urinary retention         CONTINUE these medications which have CHANGED    Details   metoprolol (TOPROL-XL) 100 MG 24 hr tablet Take 2 tablets (200 mg) by mouth daily  Qty: 30 tablet, Refills: 12    Associated Diagnoses: Essential hypertension         CONTINUE these medications which have NOT CHANGED    Details   multivitamin, therapeutic with minerals (MULTI-VITAMIN) TABS tablet Take 1 tablet by mouth daily      aspirin 81 MG tablet Take 1 tablet (81 mg) by mouth daily  Qty: 30 tablet, Refills: 12    Associated Diagnoses: Essential hypertension      atorvastatin (LIPITOR) 40 MG tablet Take 1 tablet (40 mg) by mouth daily  Qty: 30 tablet,  "Refills: 11    Associated Diagnoses: Type 2 diabetes mellitus without complication, without long-term current use of insulin (H)      levothyroxine (SYNTHROID/LEVOTHROID) 150 MCG tablet Take 1 tablet (150 mcg) by mouth daily  Qty: 30 tablet, Refills: 11    Associated Diagnoses: Hypothyroidism, unspecified type      lisinopril (PRINIVIL/ZESTRIL) 10 MG tablet Take 1 tablet (10 mg) by mouth daily  Qty: 30 tablet, Refills: 12    Associated Diagnoses: Essential hypertension with goal blood pressure less than 140/90      metFORMIN (GLUCOPHAGE) 500 MG tablet Take 1 tablet (500 mg) by mouth 2 times daily (with meals)  Qty: 60 tablet, Refills: 11    Associated Diagnoses: Type 2 diabetes mellitus without complication, without long-term current use of insulin (H)      omega 3 1000 MG CAPS Take 1 g by mouth daily  Qty: 90 capsule, Refills: 3    Associated Diagnoses: Hyperlipidemia LDL goal <100         STOP taking these medications       hydrochlorothiazide (HYDRODIURIL) 25 MG tablet Comments:   Reason for Stopping:         ammonium lactate (LAC-HYDRIN) 12 % cream Comments:   Reason for Stopping:               Discharge instructions:    Discharge Procedure Orders  HEART FAILURE ACTION PLAN   Order Comments: Heart Failure Action Plan can be completed in any of the following locations:       Heart Failure (Care Package) under patient instructions       Letters under \"Heart Failure Action Plan\"         US TCO2 Bilateral   Standing Status: Future  Standing Exp. Date: 09/25/18   Order Comments: Have you called the [facility] to schedule this exam?  Please call [phone].   Order Specific Question Answer Comments   Clinical Info for the Interpreting Provider: PAD, wounds      US VICK Doppler No Exercise   Standing Status: Future  Standing Exp. Date: 09/25/18   Order Specific Question Answer Comments   Clinical Info for the Interpreting Provider: pad      US Low Extremity Arterial Duplex Bilateral & VICK w Exercise   Standing " Status: Future  Standing Exp. Date: 10/07/18   Order Comments: PLEASE ALSO INCLUDE TOE BRACHIAL INDEX     INR   Standing Status: Future  Standing Exp. Date: 12/06/17     CARDIOLOGY EVAL ADULT REFERRAL   Referral Type: CV Cardio consult     Vascular Surgery Referral     Mental Health Referral     General info for SNF   Order Comments: Length of Stay Estimate: Long Term Care  Condition at Discharge: Stable  Level of care:skilled   Rehabilitation Potential: Fair  Admission H&P remains valid and up-to-date: Yes  Recent Chemotherapy: N/A  Use Nursing Home Standing Orders: Yes     Reason for your hospital stay   Order Comments: You were admitted following a fall due to physical decompensation. The hospitalization was complicated by respiratory failure and fluid retention. You respiratory status improved with CPAP every night. On admission your weight was 460 and on discharge 415-420.     Intake and output   Order Comments: Every shift     Daily weights   Order Comments: Call Provider for weight gain of more than 2 pounds per day or 5 pounds per week.     Bladder scan   Order Comments: X 2 for post void residual     Wound care (specify)   Order Comments: Site:   Right and left leg  Instructions:  Please change dressings daily, clean wounds daily and apply lymphedema wraps     Activity - Up with nursing assistance   Order Specific Question Answer Comments   Is discharge order? Yes      Follow Up and recommended labs and tests   Order Comments: Follow up with [facility] physician.  The following labs/tests are recommended: BMP, magnesium.    On discharge see your primary care provider in one week. Also you were referred to vascular surgery and cardiology for evaluation of your legs and heart respectively.     Physical Therapy Adult Consult   Order Comments: Evaluate and treat as clinically indicated.    Reason:  Continue to follow PT at TCU     Occupational Therapy Adult Consult   Order Comments: Evaluate and treat as  clinically indicated.    Reason:  Continue OT in TCU     Oxygen - Nasal cannula   Order Comments: 1-2 Lpm by nasal cannula to keep O2 sats 88% or greater.     Advance Diet as Tolerated   Order Comments: Follow this diet upon discharge: Orders Placed This Encounter     Fluid restriction 1500 ML FLUID     Moderate Consistent CHO Diet   Order Specific Question Answer Comments   Is discharge order? Yes         I, Janiya Niño, saw and evaluated this patient prior to discharge.    I personally reviewed vital signs, medications, labs and imaging.    I personally spent 35 minutes on discharge activities.

## 2017-10-07 NOTE — PROGRESS NOTES
INTERNAL MEDICINE PROGRESS NOTE    Clarke Moreira ([id number]) admitted on 9/23/2017  10/07/2017    Assessment & Plan:  Clarke Sotelo is a 67 year old male with a history notable for morbid obesity, atrial fibrillation, HFrEF, diabetes, PVD, and untreated depression that was admitted following a fall and CP/SOB on 9/23.  Transferred to MICU on 9/24 for AMS with acute hypoxic/hypercarbic respiratory failure and hypotension requiring initiation of BiPAP and pressors.  Much improved following nasal airway and BiPAP, off pressors and transferred to general medicine for further management.     #Acute on Chronic Hypercapnic Respiratory Failure  #Evidence of Pulmonary Hypertension  #Probable Obesity hypoventilation syndrome  Developed AMS, A fib with RVR to 150-160s and respiratory distress early AM 9/24/17. Sudden decompensated respiratory status could have been secondary to flash pulmonary edema secondary to a-fib with RVR, exacerbation of an obstructive or restrictive lung pathology, infectious causes (although infectious respiratory panel negative) and worsening of obesity hypoventilation syndrome. ABGs demonstrated pH 7.14, CO2 113. Following BiPAP and airway management with nasal trumpet, his ABG improved with pH 7.29, CO2 66. No concerns for hypoxemia. Mental status improved significantly once pCO2 in the 60s range. However, when off BiPAP for prolonged time he re accumulates CO2 and becomes lethargic. Echo with mild reduction of RV function, could be secondary to underlying Pulmonary HTN in the setting of chronic obstructive or restrictive lung disease with OHS, he has  BMI of 62. CXR from 10/04/17 with some increase in pulmonary congestion.   - Continue CPAP at night, needs to be on at the latest 2200  - Goal O2 to keep saturations > 88%  - CPAP in the evening with VBG check at midnight  - Started DuoNebs QID  - PT following as pt sedentary   - Continue with lasix as below  - Incentive spirometry      #Atrial fibrillation with RVR - improving  History of A fib in the past, not anticoagulated at home but on metoprolol for rate control. CHADS-VaSC 5. Received loading dose of amiodarone and started on amiodarone load and 6hr drip on arrival to MICU when in afib with RVR and hypotension. However, his atrial fibrillation improved once respiratory status was stabilized. Rate improved with increase metoprolol RVR could also be driven by volume overload.    - Continue metoprolol 200mg PO XL  - Held diltiazem last night due to soft pressures.   - Continue warfarin    #Hypercapnic Encephalopathy - improving  Acute mental status change early AM 9/24 felt to be secondary to acute hypercarbic respiratory failure and acute respiratory acidosis. Mental status has improved once pCO2 in the 60s with BiPAP and now on night time CPAP.  - Monitor mental status  - VBG if mental status change  - CO2 goal of ~60s       #History of HFrEF   #Pulmonary Edema  #CHF exacerbation  History of coronary artery disease/peripheral vascular disease per patient on admission. Last Echo 2008 with EF 40-45%, but on 09/24/17 recheck was described as normal. There might be a diastolic component and most suitable be HFpEF. He is on lisinopril, HCTZ, Metoprolol at home. During respiratory decompensation he did have CXR showing pulmonary edema. Which could secondary to HF exacerbation in the setting of atrial fibrillation. RV mildly reduced, IVC 3.34 without respiratory variability.  CXR from 09/26/17 with improving vascular congestion. Admission weight ~ 460lbs and dry weight around 415-420 lbs. Now 415-424lbs.   - On oral lasix 80mg PO  - Target net neutral  - BMP BID  - Will need to follow with CHF clinic      #Peripheral Vascular Disease  #Venous Stasis   Patient reported worsening numbness/loss of sensation in his feet bilaterally over the last week, acutely worsening since admission. No signs of limb ischemia, but US did reveal anterior tibial  "artery and dorsalis pedis occlusion. Sensation might be an issue of diabetic neuropathy as well.  - Continue with right leg elevation  - VICK with toe pressures as outpatient, lymphedema wraps  - Continue PT work up  - Monitor for signs of acute leg ischemia    #Depression  History of Depression and PTSD with limited social support. Declined therapy and in hospital chaplaincy services. No antidepressants at home. Ongoing concern for recent DNR/DNI code status change during this hospitalization with expressions of passive suicidality - wanting to avoid BiPap even though he understands it is a life sustaining treatment. Reports - \"the life that I live is not a life worth living.\" Psychiatry evaluated patient and did not believe depression was factoring on DNI/DNR and possible comfort care decision. Palliative care is involved and at this moment patient is agreeable to continue management and optimization of respiratory status as it is improving, but is still considering discontinuing BiPAP.   - Continue address goals of care with patient  - Palliative care following    #Lower Extremity Cellulitis - improving  Bilateral lower extremity with stasis dermatitis and ulcers. Area with some tenderness. Erythema and warmth has improve.  Started on cephalosporin on 09/23/17, completed oral dose of keflex. Leg pain and wound improving as diuresing.    - WOC and lymphedema following    #Perisplenic Free Fluid  Noted on CT Chest in the ED 9/24, splenic laceration and perisplenic fluid collection following fall. Hgb has remained stable, no increase in abdominal distension, no abdominal pain. Per surgery, as hemoglobin stable for 24 hours post-fall, can restart anticoagulation.  - Monitor, daily abdominal exams, no tenderness or signs of acute abdomen  - Intermittent CBC  - Low threshold to reimage if concern for splenic hemorrhage   - General surgery consulted, appreciate recs as above  - Started patient on " warfarin    #Dyspnea  #Atypical Chest Pain, improving  Developed left sided chest pain and shortness of breath after fall from chair. History of CAD/PVD per patient, not documented in EMR. Troponins x3 negative, D-dimer elevated but CT PE study negative for embolism. Pain not improved with nitroglycerin. CK negative, rhabdo unlikely (normal renal function, potassium). Attributable to musculoskeletal at this point with dyspnea likely from HF exacerbation in setting of dilated IVC without variability with respirations.  - Cardio respiratory monitoring  - Continue PTA lipitor   - Continue ASA 81mg q day  - daily diuresis as above  - Metoprolol as above       #Hypertension   On lisinopril at home given history of hypertension and DMII. Creatinine normal on admission. Now with improved hypotension.  - Continue lisinopril 10 mg q day as not on pressors      #Polycythemia   Hgb 17.2 on admission, peaked at 18.6. Could be secondary to hemoconcentration. Now normalized.       #DMII  On Metformin at home. HgbA1c 6.5 on 8/14/17.  - Low dose SSI      #Hypothyroidism  TSH on admission 3.11, on Synthroid 150 mcg at home  - Continue synthroid 150 mcg       #Hyponatremia, resolved.  - Will continue to monitor sodium as rate of correction appropriate in the past 48 hours.  - Will continue with diuresis  - Daily CMP  - Continue to follow, expect to improve with diuresis     #Hypophosphatemia  Could be secondary to increase respiratory effort.   - Replace PO4  - Monitor improvement      FEN: CHO consistent   Prophylaxis: DVT warfarin  Disposition: Pending TCU placement. Patient does not have COPD and due to body habitus he has thoracic restriction disorder and therefore needs CPAP.   Venous Blood Gas  Lab 09/30/17 09/29/17 09/24/17     PHV 7.40  7.37 7.14   PCO2V 79*  77* 113*   PO2V 35 54* 58*   HCO3V 49*  44* 38*   CAR 18.2 13.8  3.6*   O2PER 30.0 60 --     Code Status: DNR/DNI    Family: POC is Janice Hillman  "[phone].    Roberto Alexander MD PhD  Internal Medicine PGY2  [phone]    Patient was seen and discussed with  who agrees with above assessment and plan.    ==================================================================    Interval HistorySubjective:  Urinary retention overnight, with 975mL of output. No urinary issues since then, able to void on his own. Describes legs remarkably improved since admission.    Objective:  Most recent vital signs:  /71 (BP Location: Left arm)  Pulse 97  Temp 96.6  F (35.9  C) (Oral)  Resp 20  Ht 1.803 m (5' 11\")  Wt (!) 184 kg (405 lb 10.3 oz)  SpO2 93%  BMI 56.58 kg/m2  Temp:  [96.6  F (35.9  C)-98.2  F (36.8  C)] 96.6  F (35.9  C)  Pulse:  [] 97  Resp:  [16-20] 20  BP: ()/(52-71) 124/71  FiO2 (%):  [30 %] 30 %  SpO2:  [80 %-97 %] 93 %  Wt Readings from Last 2 Encounters:   10/07/17 (!) 184 kg (405 lb 10.3 oz)   08/14/17 (!) 191 kg (421 lb)       Intake/Output Summary (Last 24 hours) at 09/26/17 1426  Last data filed at 09/26/17 1400   Gross per 24 hour   Intake             1326 ml   Output             1580 ml   Net             -254 ml       Physical exam:  General: Patient lying comfortably in bed, NAD, wearing BiPAP mask  HEENT: EOMI, PERRL, no scleral icterus or injection, no bruits appreciated, difficult to appreciate JVD due to body habitus, MMM  Cardiac: RRR, no m/r/g appreciated.   Respiratory: reduced lung sounds bilaterally across all fields, no wheezing or crackles  GI: Obese, NT, no organomegaly, no signs of acute abdomen  Extremities: +2 edema bilaterally, lymphedema wraps on  Neuro: AOx3,  Moving all extremities sponteneously    Labs:  Results for orders placed or performed during the hospital encounter of 09/23/17 (from the past 24 hour(s))   Glucose by meter   Result Value Ref Range    Glucose 114 (H) 70 - 99 mg/dL   Glucose by meter   Result Value Ref Range    Glucose 109 (H) 70 - 99 mg/dL   Basic metabolic panel   Result Value " Ref Range    Sodium 136 133 - 144 mmol/L    Potassium 4.3 3.4 - 5.3 mmol/L    Chloride 95 94 - 109 mmol/L    Carbon Dioxide 38 (H) 20 - 32 mmol/L    Anion Gap 2 (L) 3 - 14 mmol/L    Glucose 94 70 - 99 mg/dL    Urea Nitrogen 9 7 - 30 mg/dL    Creatinine 0.80 0.66 - 1.25 mg/dL    GFR Estimate >90 >60 mL/min/1.7m2    GFR Estimate If Black >90 >60 mL/min/1.7m2    Calcium 8.7 8.5 - 10.1 mg/dL   Magnesium   Result Value Ref Range    Magnesium 2.1 1.6 - 2.3 mg/dL   Glucose by meter   Result Value Ref Range    Glucose 94 70 - 99 mg/dL   INR   Result Value Ref Range    INR 2.03 (H) 0.86 - 1.14   Basic metabolic panel   Result Value Ref Range    Sodium 139 133 - 144 mmol/L    Potassium 3.9 3.4 - 5.3 mmol/L    Chloride 98 94 - 109 mmol/L    Carbon Dioxide 37 (H) 20 - 32 mmol/L    Anion Gap 5 3 - 14 mmol/L    Glucose 93 70 - 99 mg/dL    Urea Nitrogen 7 7 - 30 mg/dL    Creatinine 0.69 0.66 - 1.25 mg/dL    GFR Estimate >90 >60 mL/min/1.7m2    GFR Estimate If Black >90 >60 mL/min/1.7m2    Calcium 8.6 8.5 - 10.1 mg/dL   Magnesium   Result Value Ref Range    Magnesium 2.0 1.6 - 2.3 mg/dL   Glucose by meter   Result Value Ref Range    Glucose 105 (H) 70 - 99 mg/dL   Glucose by meter   Result Value Ref Range    Glucose 103 (H) 70 - 99 mg/dL         Physician Attestation  I, Janiya Niño MD, saw this patient with the resident and agree with the resident s findings and plan of care as documented in the resident s note with my edits.     I personally reviewed vital signs, medications, labs and imaging.    Janiya Niño MD  Date of Service (when I saw the patient): 10/7/17

## 2017-10-07 NOTE — PLAN OF CARE
Problem: Patient Care Overview  Goal: Plan of Care/Patient Progress Review  5A PT: Attempted to see patient in PM with patient refusing PT second to fatigue. Pt stated he'd be open to PT tomorrow AM. Hardeep.

## 2017-10-08 ENCOUNTER — HOSPITAL ENCOUNTER (INPATIENT)
Facility: SKILLED NURSING FACILITY | Age: 67
LOS: 19 days | Discharge: HOME HEALTH | DRG: 189 | End: 2017-10-27
Attending: INTERNAL MEDICINE | Admitting: INTERNAL MEDICINE
Payer: COMMERCIAL

## 2017-10-08 ENCOUNTER — APPOINTMENT (OUTPATIENT)
Dept: PHYSICAL THERAPY | Facility: CLINIC | Age: 67
DRG: 291 | End: 2017-10-08
Payer: COMMERCIAL

## 2017-10-08 ENCOUNTER — APPOINTMENT (OUTPATIENT)
Dept: OCCUPATIONAL THERAPY | Facility: CLINIC | Age: 67
DRG: 291 | End: 2017-10-08
Payer: COMMERCIAL

## 2017-10-08 VITALS
TEMPERATURE: 97.2 F | DIASTOLIC BLOOD PRESSURE: 73 MMHG | WEIGHT: 315 LBS | HEIGHT: 71 IN | RESPIRATION RATE: 16 BRPM | SYSTOLIC BLOOD PRESSURE: 92 MMHG | BODY MASS INDEX: 44.1 KG/M2 | OXYGEN SATURATION: 95 % | HEART RATE: 85 BPM
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
## 2017-10-08 DIAGNOSIS — E87.70 HYPERVOLEMIA, UNSPECIFIED HYPERVOLEMIA TYPE: ICD-10-CM

## 2017-10-08 DIAGNOSIS — E11.9 TYPE 2 DIABETES MELLITUS WITHOUT COMPLICATION, WITHOUT LONG-TERM CURRENT USE OF INSULIN (H): ICD-10-CM

## 2017-10-08 DIAGNOSIS — I87.8 VENOUS STASIS: ICD-10-CM

## 2017-10-08 DIAGNOSIS — I89.0 LYMPHEDEMA OF EXTREMITY: ICD-10-CM

## 2017-10-08 DIAGNOSIS — E78.5 HYPERLIPIDEMIA LDL GOAL <100: ICD-10-CM

## 2017-10-08 DIAGNOSIS — I10 ESSENTIAL HYPERTENSION WITH GOAL BLOOD PRESSURE LESS THAN 140/90: ICD-10-CM

## 2017-10-08 DIAGNOSIS — I10 ESSENTIAL HYPERTENSION: ICD-10-CM

## 2017-10-08 DIAGNOSIS — I48.91 ATRIAL FIBRILLATION, UNSPECIFIED TYPE (H): ICD-10-CM

## 2017-10-08 DIAGNOSIS — R06.02 SOB (SHORTNESS OF BREATH): ICD-10-CM

## 2017-10-08 DIAGNOSIS — E87.6 HYPOKALEMIA: ICD-10-CM

## 2017-10-08 DIAGNOSIS — E03.9 HYPOTHYROIDISM, UNSPECIFIED TYPE: ICD-10-CM

## 2017-10-08 DIAGNOSIS — K59.00 CONSTIPATION, UNSPECIFIED CONSTIPATION TYPE: ICD-10-CM

## 2017-10-08 DIAGNOSIS — R33.9 URINARY RETENTION: ICD-10-CM

## 2017-10-08 LAB
ALBUMIN UR-MCNC: NEGATIVE MG/DL
ANION GAP SERPL CALCULATED.3IONS-SCNC: 4 MMOL/L (ref 3–14)
APPEARANCE UR: CLEAR
BACTERIA #/AREA URNS HPF: ABNORMAL /HPF
BILIRUB UR QL STRIP: NEGATIVE
BUN SERPL-MCNC: 9 MG/DL (ref 7–30)
CALCIUM SERPL-MCNC: 8.6 MG/DL (ref 8.5–10.1)
CHLORIDE SERPL-SCNC: 97 MMOL/L (ref 94–109)
CO2 SERPL-SCNC: 38 MMOL/L (ref 20–32)
COLOR UR AUTO: YELLOW
CREAT SERPL-MCNC: 0.65 MG/DL (ref 0.66–1.25)
GFR SERPL CREATININE-BSD FRML MDRD: >90 ML/MIN/1.7M2
GLUCOSE BLDC GLUCOMTR-MCNC: 101 MG/DL (ref 70–99)
GLUCOSE BLDC GLUCOMTR-MCNC: 106 MG/DL (ref 70–99)
GLUCOSE BLDC GLUCOMTR-MCNC: 94 MG/DL (ref 70–99)
GLUCOSE BLDC GLUCOMTR-MCNC: 98 MG/DL (ref 70–99)
GLUCOSE SERPL-MCNC: 94 MG/DL (ref 70–99)
GLUCOSE UR STRIP-MCNC: NEGATIVE MG/DL
HGB UR QL STRIP: NEGATIVE
INR PPP: 1.93 (ref 0.86–1.14)
KETONES UR STRIP-MCNC: 5 MG/DL
LEUKOCYTE ESTERASE UR QL STRIP: NEGATIVE
MAGNESIUM SERPL-MCNC: 2.1 MG/DL (ref 1.6–2.3)
MUCOUS THREADS #/AREA URNS LPF: PRESENT /LPF
NITRATE UR QL: NEGATIVE
PH UR STRIP: 7 PH (ref 5–7)
PLATELET # BLD AUTO: 222 10E9/L (ref 150–450)
POTASSIUM SERPL-SCNC: 4 MMOL/L (ref 3.4–5.3)
RBC #/AREA URNS AUTO: 1 /HPF (ref 0–2)
SODIUM SERPL-SCNC: 139 MMOL/L (ref 133–144)
SOURCE: ABNORMAL
SP GR UR STRIP: 1.01 (ref 1–1.03)
SQUAMOUS #/AREA URNS AUTO: 1 /HPF (ref 0–1)
UROBILINOGEN UR STRIP-MCNC: 4 MG/DL (ref 0–2)
WBC #/AREA URNS AUTO: <1 /HPF (ref 0–2)

## 2017-10-08 PROCEDURE — 97140 MANUAL THERAPY 1/> REGIONS: CPT | Mod: GO

## 2017-10-08 PROCEDURE — 99239 HOSP IP/OBS DSCHRG MGMT >30: CPT | Performed by: INTERNAL MEDICINE

## 2017-10-08 PROCEDURE — 40000275 ZZH STATISTIC RCP TIME EA 10 MIN

## 2017-10-08 PROCEDURE — 00000146 ZZHCL STATISTIC GLUCOSE BY METER IP

## 2017-10-08 PROCEDURE — 94660 CPAP INITIATION&MGMT: CPT

## 2017-10-08 PROCEDURE — 97530 THERAPEUTIC ACTIVITIES: CPT | Mod: GP

## 2017-10-08 PROCEDURE — 25000132 ZZH RX MED GY IP 250 OP 250 PS 637: Performed by: INTERNAL MEDICINE

## 2017-10-08 PROCEDURE — 97116 GAIT TRAINING THERAPY: CPT | Mod: GP

## 2017-10-08 PROCEDURE — 80048 BASIC METABOLIC PNL TOTAL CA: CPT | Performed by: INTERNAL MEDICINE

## 2017-10-08 PROCEDURE — 85610 PROTHROMBIN TIME: CPT | Performed by: INTERNAL MEDICINE

## 2017-10-08 PROCEDURE — 25000128 H RX IP 250 OP 636: Performed by: INTERNAL MEDICINE

## 2017-10-08 PROCEDURE — 81001 URINALYSIS AUTO W/SCOPE: CPT | Performed by: INTERNAL MEDICINE

## 2017-10-08 PROCEDURE — 25000132 ZZH RX MED GY IP 250 OP 250 PS 637: Performed by: STUDENT IN AN ORGANIZED HEALTH CARE EDUCATION/TRAINING PROGRAM

## 2017-10-08 PROCEDURE — 36592 COLLECT BLOOD FROM PICC: CPT | Performed by: INTERNAL MEDICINE

## 2017-10-08 PROCEDURE — 40000193 ZZH STATISTIC PT WARD VISIT

## 2017-10-08 PROCEDURE — 25000132 ZZH RX MED GY IP 250 OP 250 PS 637: Performed by: HOSPITALIST

## 2017-10-08 PROCEDURE — 83735 ASSAY OF MAGNESIUM: CPT | Performed by: INTERNAL MEDICINE

## 2017-10-08 PROCEDURE — 12000022 ZZH R&B SNF

## 2017-10-08 PROCEDURE — 40000133 ZZH STATISTIC OT WARD VISIT

## 2017-10-08 PROCEDURE — 85049 AUTOMATED PLATELET COUNT: CPT | Performed by: INTERNAL MEDICINE

## 2017-10-08 RX ORDER — FAMOTIDINE 10 MG
20 TABLET ORAL ONCE
Status: COMPLETED | OUTPATIENT
Start: 2017-10-08 | End: 2017-10-08

## 2017-10-08 RX ORDER — METOPROLOL SUCCINATE 100 MG/1
200 TABLET, EXTENDED RELEASE ORAL DAILY
Status: DISCONTINUED | OUTPATIENT
Start: 2017-10-09 | End: 2017-10-20

## 2017-10-08 RX ORDER — IPRATROPIUM BROMIDE AND ALBUTEROL SULFATE 2.5; .5 MG/3ML; MG/3ML
3 SOLUTION RESPIRATORY (INHALATION) EVERY 4 HOURS PRN
Status: DISCONTINUED | OUTPATIENT
Start: 2017-10-08 | End: 2017-10-27 | Stop reason: HOSPADM

## 2017-10-08 RX ORDER — MULTIPLE VITAMINS W/ MINERALS TAB 9MG-400MCG
1 TAB ORAL DAILY
Status: DISCONTINUED | OUTPATIENT
Start: 2017-10-09 | End: 2017-10-27 | Stop reason: HOSPADM

## 2017-10-08 RX ORDER — POTASSIUM CHLORIDE 1.5 G/1.58G
20-40 POWDER, FOR SOLUTION ORAL
Status: DISCONTINUED | OUTPATIENT
Start: 2017-10-08 | End: 2017-10-27 | Stop reason: HOSPADM

## 2017-10-08 RX ORDER — CHLORAL HYDRATE 500 MG
1 CAPSULE ORAL DAILY
Status: DISCONTINUED | OUTPATIENT
Start: 2017-10-09 | End: 2017-10-27 | Stop reason: HOSPADM

## 2017-10-08 RX ORDER — ATORVASTATIN CALCIUM 40 MG/1
40 TABLET, FILM COATED ORAL DAILY
Status: DISCONTINUED | OUTPATIENT
Start: 2017-10-09 | End: 2017-10-27 | Stop reason: HOSPADM

## 2017-10-08 RX ORDER — ACETAMINOPHEN 325 MG/1
650 TABLET ORAL EVERY 4 HOURS PRN
Status: DISCONTINUED | OUTPATIENT
Start: 2017-10-08 | End: 2017-10-27 | Stop reason: HOSPADM

## 2017-10-08 RX ORDER — SENNOSIDES 8.6 MG
1-2 TABLET ORAL 2 TIMES DAILY PRN
Status: DISCONTINUED | OUTPATIENT
Start: 2017-10-08 | End: 2017-10-26

## 2017-10-08 RX ORDER — FUROSEMIDE 40 MG
80 TABLET ORAL DAILY
Status: DISCONTINUED | OUTPATIENT
Start: 2017-10-09 | End: 2017-10-20

## 2017-10-08 RX ORDER — POTASSIUM CHLORIDE 7.45 MG/ML
10 INJECTION INTRAVENOUS
Status: DISCONTINUED | OUTPATIENT
Start: 2017-10-08 | End: 2017-10-27 | Stop reason: HOSPADM

## 2017-10-08 RX ORDER — POTASSIUM CHLORIDE 1500 MG/1
20-40 TABLET, EXTENDED RELEASE ORAL
Status: DISCONTINUED | OUTPATIENT
Start: 2017-10-08 | End: 2017-10-10 | Stop reason: RX

## 2017-10-08 RX ORDER — POTASSIUM CL/LIDO/0.9 % NACL 10MEQ/0.1L
10 INTRAVENOUS SOLUTION, PIGGYBACK (ML) INTRAVENOUS
Status: DISCONTINUED | OUTPATIENT
Start: 2017-10-08 | End: 2017-10-27 | Stop reason: HOSPADM

## 2017-10-08 RX ORDER — ASPIRIN 81 MG/1
81 TABLET, CHEWABLE ORAL DAILY
Status: DISCONTINUED | OUTPATIENT
Start: 2017-10-09 | End: 2017-10-27 | Stop reason: HOSPADM

## 2017-10-08 RX ORDER — ONDANSETRON 4 MG/1
4 TABLET, ORALLY DISINTEGRATING ORAL EVERY 6 HOURS PRN
Status: DISCONTINUED | OUTPATIENT
Start: 2017-10-08 | End: 2017-10-27 | Stop reason: HOSPADM

## 2017-10-08 RX ORDER — ALUMINA, MAGNESIA, AND SIMETHICONE 2400; 2400; 240 MG/30ML; MG/30ML; MG/30ML
30 SUSPENSION ORAL EVERY 4 HOURS PRN
Status: DISCONTINUED | OUTPATIENT
Start: 2017-10-08 | End: 2017-10-27 | Stop reason: HOSPADM

## 2017-10-08 RX ORDER — POTASSIUM CHLORIDE 14.9 MG/ML
20 INJECTION INTRAVENOUS
Status: DISCONTINUED | OUTPATIENT
Start: 2017-10-08 | End: 2017-10-27 | Stop reason: HOSPADM

## 2017-10-08 RX ORDER — ACETAMINOPHEN 650 MG/1
650 SUPPOSITORY RECTAL EVERY 4 HOURS PRN
Status: DISCONTINUED | OUTPATIENT
Start: 2017-10-08 | End: 2017-10-27 | Stop reason: HOSPADM

## 2017-10-08 RX ORDER — ONDANSETRON 2 MG/ML
4 INJECTION INTRAMUSCULAR; INTRAVENOUS EVERY 6 HOURS PRN
Status: DISCONTINUED | OUTPATIENT
Start: 2017-10-08 | End: 2017-10-27 | Stop reason: HOSPADM

## 2017-10-08 RX ORDER — DILTIAZEM HYDROCHLORIDE 30 MG/1
30 TABLET, FILM COATED ORAL 2 TIMES DAILY
Status: DISCONTINUED | OUTPATIENT
Start: 2017-10-08 | End: 2017-10-09

## 2017-10-08 RX ORDER — TAMSULOSIN HYDROCHLORIDE 0.4 MG/1
0.4 CAPSULE ORAL DAILY
Status: DISCONTINUED | OUTPATIENT
Start: 2017-10-09 | End: 2017-10-09

## 2017-10-08 RX ORDER — MAGNESIUM SULFATE HEPTAHYDRATE 40 MG/ML
4 INJECTION, SOLUTION INTRAVENOUS EVERY 4 HOURS PRN
Status: DISCONTINUED | OUTPATIENT
Start: 2017-10-08 | End: 2017-10-27 | Stop reason: HOSPADM

## 2017-10-08 RX ORDER — POTASSIUM CHLORIDE 1500 MG/1
40 TABLET, EXTENDED RELEASE ORAL 2 TIMES DAILY
Status: DISCONTINUED | OUTPATIENT
Start: 2017-10-08 | End: 2017-10-10 | Stop reason: RX

## 2017-10-08 RX ORDER — LISINOPRIL 10 MG/1
10 TABLET ORAL DAILY
Status: DISCONTINUED | OUTPATIENT
Start: 2017-10-09 | End: 2017-10-18

## 2017-10-08 RX ADMIN — POLYETHYLENE GLYCOL 3350 17 G: 17 POWDER, FOR SOLUTION ORAL at 09:00

## 2017-10-08 RX ADMIN — FAMOTIDINE 20 MG: 10 TABLET, FILM COATED ORAL at 11:36

## 2017-10-08 RX ADMIN — ATORVASTATIN CALCIUM 40 MG: 40 TABLET, FILM COATED ORAL at 08:49

## 2017-10-08 RX ADMIN — POTASSIUM CHLORIDE 40 MEQ: 1.5 POWDER, FOR SOLUTION ORAL at 08:59

## 2017-10-08 RX ADMIN — ASPIRIN 81 MG CHEWABLE TABLET 81 MG: 81 TABLET CHEWABLE at 08:48

## 2017-10-08 RX ADMIN — SENNOSIDES AND DOCUSATE SODIUM 2 TABLET: 8.6; 5 TABLET ORAL at 08:57

## 2017-10-08 RX ADMIN — FUROSEMIDE 80 MG: 40 TABLET ORAL at 08:57

## 2017-10-08 RX ADMIN — POTASSIUM CHLORIDE 40 MEQ: 1500 TABLET, EXTENDED RELEASE ORAL at 20:49

## 2017-10-08 RX ADMIN — METFORMIN HYDROCHLORIDE 500 MG: 500 TABLET ORAL at 18:34

## 2017-10-08 RX ADMIN — LISINOPRIL 10 MG: 10 TABLET ORAL at 08:52

## 2017-10-08 RX ADMIN — MULTIPLE VITAMINS W/ MINERALS TAB 1 TABLET: TAB at 08:55

## 2017-10-08 RX ADMIN — SODIUM CHLORIDE, PRESERVATIVE FREE 5 ML: 5 INJECTION INTRAVENOUS at 06:50

## 2017-10-08 RX ADMIN — WARFARIN SODIUM 12.5 MG: 7.5 TABLET ORAL at 18:34

## 2017-10-08 RX ADMIN — TAMSULOSIN HYDROCHLORIDE 0.4 MG: 0.4 CAPSULE ORAL at 08:55

## 2017-10-08 RX ADMIN — LEVOTHYROXINE SODIUM 150 MCG: 25 TABLET ORAL at 08:50

## 2017-10-08 RX ADMIN — METOPROLOL SUCCINATE 200 MG: 200 TABLET, EXTENDED RELEASE ORAL at 08:51

## 2017-10-08 RX ADMIN — DILTIAZEM HYDROCHLORIDE 30 MG: 30 TABLET, FILM COATED ORAL at 08:54

## 2017-10-08 RX ADMIN — ACETAMINOPHEN 650 MG: 325 TABLET, FILM COATED ORAL at 21:57

## 2017-10-08 RX ADMIN — SODIUM CHLORIDE, PRESERVATIVE FREE 5 ML: 5 INJECTION INTRAVENOUS at 08:58

## 2017-10-08 RX ADMIN — Medication 1 G: at 08:48

## 2017-10-08 RX ADMIN — DILTIAZEM HYDROCHLORIDE 30 MG: 30 TABLET, FILM COATED ORAL at 20:49

## 2017-10-08 NOTE — PLAN OF CARE
"Problem: Patient Care Overview  Goal: Plan of Care/Patient Progress Review  Outcome: Improving  Blood pressure 103/54, pulse 97, temperature 97.5  F (36.4  C), temperature source Oral, resp. rate 20, height 1.803 m (5' 11\"), weight (!) 184 kg (405 lb 10.3 oz), SpO2 96 %.    Pt here with hypercapnic respiratory failure. Pt is A&Ox4, able to make needs known. VSS on BIPAP/CPAP overnight. BPs soft.  overnight. Pt still reporting some straining with voiding, bladder scan was low. Pt declined offer to straight cath. Said that he wanted to wait. Tolerating PO intake without nausea/emesis. Denies pain. Dressings to BLE CDI, per pt dressings have been changed daily due to lymphedema wraps. Dressings were changed prior to evening shift, and lymph wrapped afterwards. [facility]. Will continue to monitor and follow POC.           "

## 2017-10-08 NOTE — PLAN OF CARE
Problem: Patient Care Overview  Goal: Plan of Care/Patient Progress Review  Edema/5A: Wraps removed, pt reporting comfort with fit, subjectively noting LEs appear closer to baseline. Pt with 1+ pitting throughout, skin dusky and dark in appearance but intact. Wound cares completed by RN. Skin cares performed prior to reapplication of GCB with sween 24 from MTPs to knee creases for continued edema management and protection of skin integrity. Okay to wear x 24 hours until pt requires new dressing changes. Please remove compression if causing numbness, tingling, pain, or becomes soiled. Pt to continue use of compression wraps upon discharge to promote wound healing and fluid mobilization.

## 2017-10-08 NOTE — IP AVS SNAPSHOT
[address] [address]    Phone:  [phone]                                       After Visit Summary   10/8/2017    Clarke Moreira    MRN: [id number]           After Visit Summary Signature Page     I have received my discharge instructions, and my questions have been answered. I have discussed any challenges I see with this plan with the nurse or doctor.    ..........................................................................................................................................  Patient/Patient Representative Signature      ..........................................................................................................................................  Patient Representative Print Name and Relationship to Patient    ..................................................               ................................................  Date                                            Time    ..........................................................................................................................................  Reviewed by Signature/Title    ...................................................              ..............................................  Date                                                            Time

## 2017-10-08 NOTE — PHARMACY-ANTICOAGULATION SERVICE
Clinical Pharmacy - Warfarin Dosing Consult     Pharmacy has been consulted to manage this patient s warfarin therapy.  Indication: Atrial Fibrillation  Therapy Goal: INR 2-3  Provider/Team: Dr. Olson  Warfarin Prior to Admission: No  Significant drug interactions: aspirin, atorvastatin, levothyrpxine  Recent documented change in oral intake/nutrition: No    INR   Date Value Ref Range Status   10/08/2017 1.93 (H) 0.86 - 1.14 Final   10/07/2017 2.03 (H) 0.86 - 1.14 Final       Recommend warfarin 12.5 mg today.  Pharmacy will monitor Clarke RENÉE Marreroon daily and order warfarin doses to achieve specified goal.      Please contact pharmacy as soon as possible if the warfarin needs to be held for a procedure or if the warfarin goals change.      Homar Morin, PharmD

## 2017-10-08 NOTE — PROGRESS NOTES
Pt A&O. VSS or right arm. ON NC 2.5L. Lift/ A2 to commode, urinal at bedside, up with PT to chair. On MOD/COH diet BG 94 & 98, held insulin. No c/o of pain, but stated numbness and tingling in lower extremities. Urine output of 350ml this shift, no BM. Wound care performed on lower extremities, Lymph rewrapped. Gave report to RN at  TCU, instructed to leave PICC line in. Will transfer to  TCU at 1530 by [facility].

## 2017-10-08 NOTE — PROGRESS NOTES
Social Work Services Discharge Note      Patient Name:  Clarke Moreira     Anticipated Discharge Date:  10/8/17    Discharge Disposition:   TCU:  [facility]    Following MD:  [place]     Pre-Admission Screening:  Done already. See previous SW note     Additional Services/Equipment Arranged:  Bariatric stretcher and Oxygen. D/C and ride had already been arranged. Ride was set for 2pm today.  TCU requested changed in ride to 3:30pm     Patient / Family response to discharge plan:  Agreeable to discharge     Persons notified of above discharge plan:  patient    Staff Discharge Instructions:      Met with patient to explain need for change in ride time. Confirmed details with Fang in admissions at  Rehab. Called [facility] and requested change in time to 3:30.

## 2017-10-08 NOTE — IP AVS SNAPSHOT
MRN:[id number]                      After Visit Summary   10/8/2017    Clarke Moreira    MRN: [id number]           Thank you!     Thank you for choosing [facility] for your care. Our goal is always to provide you with excellent care. Hearing back from our patients is one way we can continue to improve our services. Please take a few minutes to complete the written survey that you may receive in the mail after you visit with us. Thank you!        Patient Information     Date Of Birth          1950        Designated Caregiver       Most Recent Value    Caregiver    Will someone help with your care after discharge? no      About your hospital stay     You were admitted on:  October 8, 2017 You last received care in the:  [facility]    You were discharged on:  October 27, 2017        Reason for your hospital stay       Admitted for rehabilitation s/p respiratory failure.  In TCU , orthostatic hypotension, your blood pressure and diuretics dose adjusted.                  Who to Call     For medical emergencies, please call 911.  For non-urgent questions about your medical care, please call your primary care provider or clinic, [phone]          Attending Provider     Provider Specialty    Mathew Olson MD Internal Medicine       Primary Care Provider Office Phone # Fax #    Matthias Sanchez [phone]      After Care Instructions     Activity       Your activity upon discharge: activity as tolerated            Diet       Fluid restriction 1500 ML FLUID, 2 gm Sodium diet.  Moderate Consistent CHO (4-6 CHO units/meal)    Snacks/Supplements Adult: Other - Please comment; Gelatein; With Meals      Snacks/Supplements Adult: Other - Please comment; Glucerna or TwoCal HN; Between Meals            Monitor and record       Watch for fever, chills, nausea, vomiting, chest pain, shortness of breath, lightheadedness or dizziness, increased swelling in your leg  If these  symptoms occurs, please call your primary care or come to ED    Monitor Blood pressure, weight 2-3 times weekly.                  Follow-up Appointments     Adult [facility] Follow-up and recommended labs and tests       Follow up with primary care provider, Matthias Sanchez, within 7 days for hospital follow- up.  The following labs/tests are recommended: CBC, BMP, INR  Follow up with [facility] in 2-4 weeks  Outpatient Sleep study    Appointments on [facility] and/or [facility] (with [facility] or [facility] provider or service). Call [phone] if you haven't heard regarding these appointments within 7 days of discharge.                  Your next 10 appointments already scheduled     Nov 02, 2017  2:00 PM CDT   US VICK DOPPLER NO EXERCISE 1-2 LVLS BILAT with [id number]  ([facility])    [address] [place] [id number] [phone]           Please bring a list of your medicines (including vitamins, minerals and over-the-counter drugs). Also, tell your doctor about any allergies you may have. Wear comfortable clothes and leave your valuables at home.  No caffeine or tobacco for 1 hour prior to exam.  Please call the Imaging Department at your exam site with any questions.            Nov 02, 2017  3:00 PM CDT   US TCO2 with [facility]  ([facility] [place])    [address] [place] [id number] [phone]           Please bring a list of your medicines (including vitamins, minerals and over-the-counter drugs). Also, tell your doctor about any allergies you may have. Wear comfortable clothes and leave your valuables at home.  No caffeine or tobacco for 1 hour prior to exam.  Please call the Imaging Department at your exam site with any questions.            Nov 02, 2017  4:00 PM CDT   (Arrive by 3:45 PM)   New Vascular Visit with Christa Velazquez MD   [facility] ([facility])    [address]  3rd Floor  [facility] [address] [phone]              Additional Services     Home Care OT Referral for Hospital Discharge       OT to eval and treat ADL's/IADLs', home safety, BLE lymphedema    Your provider has ordered home care - occupational therapy. If you have not been contacted within 2 days of your discharge please call the department phone number listed on the top of this document.            Home Care PT Referral for Hospital Discharge       PT to eval and treat mobility, strengthening, home safety    Your provider has ordered home care - physical therapy. If you have not been contacted within 2 days of your discharge please call the department phone number listed on the top of this document.            Home care nursing referral       RN skilled nursing visit. RN to assess vital signs and weight, respiratory and cardiac status, patients ability to take and record daily blood pressure, temp and weight, pain level and activity tolerance, hydration, nutrition and bowel status, R heel wound and home safety.  RN to teach medication management and wound care to R heel.  RN to provide R heel wound care and INR checks.    Your provider has ordered home care nursing services. If you have not been contacted within 2 days of your discharge please call the inpatient department phone number at [phone] .                  Further instructions from your care team       [facility] RN to check INR and coordinate with [facility] (under PCP Matthias Sanchez) for Coumadin dosing management [phone]. The patient should have an INR checked on Monday 10/30/17    Right plantar heel wound: wash every other day with wound cleanser and gauze. Apply Medihoney to wound base and cover with Mepilex 4x4.  Left dorsal foot wound: wash every other day with wound cleanser and gauze. Cover with Adaptic followed by Optifoam under lymphedema  "wraps.  Bilateral lower legs: wash with soap and water and wash cloth prior to lymphedema wrap placement. Moisturize freely with Sween 24.  At home: may use over the counter lotions for dry skin in place of sween cream: vanicream (or vanicream lite), aquaphor ointment for open areas.  Avoid lotions with lanolin in the ingredients.      Warfarin Instruction     You have started taking a medicine called warfarin. This is a blood-thinning medicine (anticoagulant). It helps prevent and treat blood clots.      Before leaving the hospital, make sure you know how much to take and how long to take it.      You will need regular blood tests to make sure your blood is clotting safely. It is very important to see your doctor for regular blood tests.    Talk to your doctor before taking any new medicine (this includes over-the-counter drugs and herbal products). Many medicines can interact with warfarin. This may cause more bleeding or too much clotting.     Eating a lot of vitamin K--found in green, leafy vegetables--can change the way warfarin works in your body. Do NOT avoid these foods. Instead, try to eat the same amount each day.     Bleeding is the most common side-effect of warfarin. You may notice bleeding gums, a bloody nose, bruises and bleeding longer when you cut yourself. See a doctor at once if:   o You cough up blood  o You find blood in your stool (poop)  o You have a deep cut, or a cut that bleeds longer than 10 minutes   o You have a bad cut, hard fall, accident or hit your head (go to urgent care or the emergency room).    For women who can get pregnant: This medicine can harm an unborn baby. Be very careful not to get pregnant while taking this medicine. If you think you might be pregnant, call your doctor right away.    For more information, read \"Guide to Warfarin Therapy,  the booklet you received in the hospital.        Pending Results     No orders found from 10/6/2017 to 10/9/2017.          " "  Statement of Approval     Ordered          10/27/17 1041  I have reviewed and agree with all the recommendations and orders detailed in this document.  EFFECTIVE NOW     Approved and electronically signed by:  Oswald Mcneal MD             Admission Information     Date & Time Provider Department Dept. Phone    10/8/2017 Mathew Olson MD  Transitional Care [phone]      Your Vitals Were     Blood Pressure Pulse Temperature Respirations Height Weight    118/75 (BP Location: Left arm) 101 96.8  F (36  C) (Oral) 16 1.829 m (6') 181.9 kg (401 lb)    Pulse Oximetry BMI (Body Mass Index)                94% 54.39 kg/m2          MyChart Information     [employer] lets you send messages to your doctor, view your test results, renew your prescriptions, schedule appointments and more. To sign up, go to [email or web address] . Click on \"Log in\" on the left side of the screen, which will take you to the Welcome page. Then click on \"Sign up Now\" on the right side of the page.     You will be asked to enter the access code listed below, as well as some personal information. Please follow the directions to create your username and password.     Your access code is: [email or web address]  Expires: 2017  2:53 PM     Your access code will  in 90 days. If you need help or a new code, please call your [facility] clinic or [phone].        Care EveryWhere ID     This is your Care EveryWhere ID. This could be used by other organizations to access your [facility] medical records  [id number]        Equal Access to Services     TYLER RICO : Hadii kell enriquez Sodillan, waaxda luqadaha, qaybta kaalmada vasyl banda . So [facility] [phone].    ATENCIÓN: Si habla español, tiene a kim disposición servicios gratuitos de asistencia lingüística. Llame al [phone].    We comply with applicable federal civil rights laws and [place] laws. We do not discriminate on the basis of race, color, " national origin, age, disability, sex, sexual orientation, or gender identity.               Review of your medicines      START taking        Dose / Directions    ascorbic acid 500 MG Tabs   Used for:  Venous stasis        Dose:  500 mg   Take 1 tablet (500 mg) by mouth daily for 8 days   Quantity:  8 tablet   Refills:  0       polyethylene glycol Packet   Commonly known as:  MIRALAX/GLYCOLAX   Used for:  Constipation, unspecified constipation type        Dose:  17 g   Take 17 g by mouth daily as needed for constipation   Quantity:  15 packet   Refills:  0       potassium chloride SA 20 MEQ CR tablet   Commonly known as:  K-DUR/KLOR-CON M   Indication:  hypokalemia   Used for:  Hypokalemia        Dose:  40 mEq   Take 2 tablets (40 mEq) by mouth daily   Quantity:  30 tablet   Refills:  0       senna-docusate 8.6-50 MG per tablet   Commonly known as:  SENOKOT-S;PERICOLACE   Used for:  Constipation, unspecified constipation type        Dose:  1-2 tablet   Take 1-2 tablets by mouth 2 times daily as needed for constipation   Quantity:  60 tablet   Refills:  1       vitamin A 41072 UNIT capsule   Used for:  Venous stasis        Dose:  56368 Units   Take 2 capsules (20,000 Units) by mouth daily for 8 days   Quantity:  16 capsule   Refills:  0       zinc sulfate 220 (50 ZN) MG capsule   Commonly known as:  ZINCATE   Used for:  Venous stasis        Dose:  220 mg   Take 1 capsule (220 mg) by mouth daily   Quantity:  30 capsule   Refills:  0         CONTINUE these medicines which may have CHANGED, or have new prescriptions. If we are uncertain of the size of tablets/capsules you have at home, strength may be listed as something that might have changed.        Dose / Directions    furosemide 20 MG tablet   Commonly known as:  LASIX   This may have changed:    - medication strength  - how much to take   Used for:  Hypervolemia, unspecified hypervolemia type        Dose:  20 mg   Start taking on:  10/28/2017   Take 1 tablet (20  mg) by mouth daily   Quantity:  30 tablet   Refills:  0       lisinopril 5 MG tablet   Commonly known as:  PRINIVIL/ZESTRIL   This may have changed:    - medication strength  - how much to take   Used for:  Essential hypertension with goal blood pressure less than 140/90        Dose:  2.5 mg   Take 0.5 tablets (2.5 mg) by mouth daily   Quantity:  30 tablet   Refills:  0       metoprolol 100 MG 24 hr tablet   Commonly known as:  TOPROL-XL   This may have changed:  how much to take   Used for:  Essential hypertension        Dose:  100 mg   Take 1 tablet (100 mg) by mouth daily   Quantity:  30 tablet   Refills:  0       warfarin 5 MG tablet   Commonly known as:  COUMADIN   This may have changed:    - medication strength  - how much to take   Used for:  Atrial fibrillation, unspecified type (H)        Dose:  12.5 mg   Take 2.5 tablets (12.5 mg) by mouth daily   Quantity:  30 tablet   Refills:  0         CONTINUE these medicines which have NOT CHANGED        Dose / Directions    aspirin 81 MG tablet   Used for:  Essential hypertension        Dose:  81 mg   Take 1 tablet (81 mg) by mouth daily   Quantity:  30 tablet   Refills:  0       atorvastatin 40 MG tablet   Commonly known as:  LIPITOR   Used for:  Type 2 diabetes mellitus without complication, without long-term current use of insulin (H)        Dose:  40 mg   Take 1 tablet (40 mg) by mouth daily   Quantity:  30 tablet   Refills:  0       ipratropium - albuterol 0.5 mg/2.5 mg/3 mL 0.5-2.5 (3) MG/3ML neb solution   Commonly known as:  DUONEB   Used for:  SOB (shortness of breath)        Dose:  3 mL   Take 1 vial (3 mLs) by nebulization every 4 hours as needed for wheezing   Quantity:  360 mL   Refills:  3       levothyroxine 150 MCG tablet   Commonly known as:  SYNTHROID/LEVOTHROID   Used for:  Hypothyroidism, unspecified type        Dose:  150 mcg   Take 1 tablet (150 mcg) by mouth daily   Quantity:  30 tablet   Refills:  0       metFORMIN 500 MG tablet   Commonly  known as:  GLUCOPHAGE   Used for:  Type 2 diabetes mellitus without complication, without long-term current use of insulin (H)        Dose:  500 mg   Take 1 tablet (500 mg) by mouth 2 times daily (with meals)   Quantity:  60 tablet   Refills:  0       multivitamin, therapeutic with minerals Tabs tablet   Used for:  Type 2 diabetes mellitus without complication, without long-term current use of insulin (H)        Dose:  1 tablet   Take 1 tablet by mouth daily   Quantity:  30 each   Refills:  0       omega 3 1000 MG Caps   Used for:  Hyperlipidemia LDL goal <100        Dose:  1 g   Take 1 g by mouth daily   Quantity:  30 capsule   Refills:  0       tamsulosin 0.4 MG capsule   Commonly known as:  FLOMAX   Used for:  Urinary retention        Dose:  0.4 mg   Take 1 capsule (0.4 mg) by mouth daily   Quantity:  60 capsule   Refills:  1         STOP taking     diltiazem 30 MG tablet   Commonly known as:  CARDIZEM                Where to get your medicines      These medications were sent to [id number] - [place], [place] - [address]  [address] [facility] [address]     Phone:  [phone]     ascorbic acid 500 MG Tabs    aspirin 81 MG tablet    atorvastatin 40 MG tablet    ipratropium - albuterol 0.5 mg/2.5 mg/3 mL 0.5-2.5 (3) MG/3ML neb solution    levothyroxine 150 MCG tablet    lisinopril 5 MG tablet    metFORMIN 500 MG tablet    multivitamin, therapeutic with minerals Tabs tablet    omega 3 1000 MG Caps    polyethylene glycol Packet    potassium chloride SA 20 MEQ CR tablet    senna-docusate 8.6-50 MG per tablet    vitamin A 57176 UNIT capsule    warfarin 5 MG tablet    zinc sulfate 220 (50 ZN) MG capsule         Some of these will need a paper prescription and others can be bought over the counter. Ask your nurse if you have questions.     Bring a paper prescription for each of these medications     furosemide 20 MG tablet    metoprolol 100 MG 24 hr tablet                Protect others around  you: Learn how to safely use, store and throw away your medicines at www.disposemymeds.org.             Medication List: This is a list of all your medications and when to take them. Check marks below indicate your daily home schedule. Keep this list as a reference.      Medications           Morning Afternoon Evening Bedtime As Needed    ascorbic acid 500 MG Tabs   Take 1 tablet (500 mg) by mouth daily for 8 days   Last time this was given:  500 mg on 10/27/2017  8:13 AM                                aspirin 81 MG tablet   Take 1 tablet (81 mg) by mouth daily                                atorvastatin 40 MG tablet   Commonly known as:  LIPITOR   Take 1 tablet (40 mg) by mouth daily   Last time this was given:  40 mg on 10/27/2017  8:15 AM                                furosemide 20 MG tablet   Commonly known as:  LASIX   Take 1 tablet (20 mg) by mouth daily   Start taking on:  10/28/2017   Last time this was given:  40 mg on 10/23/2017  8:37 AM                                ipratropium - albuterol 0.5 mg/2.5 mg/3 mL 0.5-2.5 (3) MG/3ML neb solution   Commonly known as:  DUONEB   Take 1 vial (3 mLs) by nebulization every 4 hours as needed for wheezing                                levothyroxine 150 MCG tablet   Commonly known as:  SYNTHROID/LEVOTHROID   Take 1 tablet (150 mcg) by mouth daily   Last time this was given:  150 mcg on 10/27/2017  6:56 AM                                lisinopril 5 MG tablet   Commonly known as:  PRINIVIL/ZESTRIL   Take 0.5 tablets (2.5 mg) by mouth daily   Last time this was given:  5 mg on 10/23/2017  8:42 AM                                metFORMIN 500 MG tablet   Commonly known as:  GLUCOPHAGE   Take 1 tablet (500 mg) by mouth 2 times daily (with meals)   Last time this was given:  500 mg on 10/27/2017  8:13 AM                                metoprolol 100 MG 24 hr tablet   Commonly known as:  TOPROL-XL   Take 1 tablet (100 mg) by mouth daily   Last time this was given:  100 mg  on 10/27/2017  8:12 AM                                multivitamin, therapeutic with minerals Tabs tablet   Take 1 tablet by mouth daily   Last time this was given:  1 tablet on 10/27/2017  8:13 AM                                omega 3 1000 MG Caps   Take 1 g by mouth daily   Last time this was given:  1 g on 10/27/2017  8:13 AM                                polyethylene glycol Packet   Commonly known as:  MIRALAX/GLYCOLAX   Take 17 g by mouth daily as needed for constipation   Last time this was given:  17 g on 10/26/2017 12:16 PM                                potassium chloride SA 20 MEQ CR tablet   Commonly known as:  K-DUR/KLOR-CON M   Take 2 tablets (40 mEq) by mouth daily   Last time this was given:  40 mEq on 10/27/2017  8:14 AM                                senna-docusate 8.6-50 MG per tablet   Commonly known as:  SENOKOT-S;PERICOLACE   Take 1-2 tablets by mouth 2 times daily as needed for constipation                                tamsulosin 0.4 MG capsule   Commonly known as:  FLOMAX   Take 1 capsule (0.4 mg) by mouth daily   Last time this was given:  0.4 mg on 10/27/2017  8:14 AM                                vitamin A 61942 UNIT capsule   Take 2 capsules (20,000 Units) by mouth daily for 8 days   Last time this was given:  20,000 Units on 10/27/2017  8:14 AM                                warfarin 5 MG tablet   Commonly known as:  COUMADIN   Take 2.5 tablets (12.5 mg) by mouth daily   Last time this was given:  10 mg on 10/26/2017  5:54 PM                                zinc sulfate 220 (50 ZN) MG capsule   Commonly known as:  ZINCATE   Take 1 capsule (220 mg) by mouth daily   Last time this was given:  220 mg on 10/27/2017  8:15 AM                                          More Information        Pulmonary Edema  Your healthcare provider has told you that you have pulmonary edema. Read on to learn more about pulmonary edema and how it can be treated.  What is pulmonary edema?     Pulmonary edema  is fluid in the air sacs (alveoli) in the lungs.   Pulmonary edema occurs when the air sacs (alveoli) in your lungs fill with fluid. The fluid buildup makes it hard for the lungs to do their job, including getting oxygen from the air you breathe. This can make it hard to breathe. The most common cause of pulmonary edema is heart failure. When the heart doesn t work properly, it can cause pressure to rise in the veins (blood vessels) of the lungs. As pressure builds, fluid leaks out of the congested veins. It fills the alveoli. The extra fluid prevents oxygen from moving through the lungs as it should. But heart failure isn t the only cause of pulmonary edema. Damage to the lungs or kidney failure can also cause fluid to fill the lungs. And in some cases, living or exercising at high altitudes can lead to fluid buildup in the lungs.  How is pulmonary edema diagnosed?  Your healthcare provider does an exam and asks about your health history. You may also have one or more of the following:    Blood tests to take samples of blood.    Imaging tests to take detailed pictures of inside the body. These may include a chest X-ray and ultrasound.    Electrocardiography (ECG)  and echocardiogram to test how well the heart is functioning.  How is pulmonary edema treated?  Treatment usually depends on what s causing the edema. For instance, if it s because of heart failure, treating the heart condition will treat the edema. Treatment can also ease symptoms. Therapy often includes the following:    Oxygen. This may be given through a mask that goes over the nose. It may be given through a small tube that sits under the nose. Sometimes, pressurized air will be needed through a tight fitting mask, using a machine called CPAP or BiPAP. Or it may be given through a tube placed into the windpipe (trachea). If a tube is required, a ventilator, often called a breathing machine or respirator will be used.    Medicines. These may include  water pills (diuretics) to help your body get rid of extra fluid. The fluid passes out of your body as urine. You may also need medicines to treat the heart. These can help your heart work better. This helps reduce fluid buildup in the lungs.  What are the long-term concerns?  If treated right away, pulmonary edema can be improved. It may even be cured. But in some cases, ongoing treatment is needed to help control the problem. This may require having procedures or taking medicines for months or years. In some cases, you may need to use oxygen or breathing equipment for a long time. This can lead to complications such as damage to lung tissue. Your healthcare provider can tell you more if needed.  Call 911  Call 911 if any of these occur:    Chest pain    Severe trouble breathing    Coughing up blood    Skin turns blue      When to call the healthcare provider  Call your the healthcare provider right away if you have any of the following:    Unusual or irregular heartbeat    Unable to speak full sentences before running out of breath    Sweating more than usual   Date Last Reviewed: 2/1/2017 2000-2017 [employer]. [address] [place] [address]. All rights reserved. This information is not intended as a substitute for professional medical care. Always follow your healthcare professional's instructions.                Leg Swelling in Both Legs    Swelling of the feet, ankles, and legs is called edema. It is caused by excess fluid that has collected in the tissues. Extra fluid in the body settles in the lowest part because of gravity. This is why the legs and feet are most affected.  Some of the causes for edema include:    Disease of the heart like congestive heart failure    Standing or sitting for long periods of time    Infection of the feet or legs    Blood pooling in the veins of your legs (venous insufficiency)    Dilated veins in your lower leg (varicose veins)    Garters or other  clothing that is tight on your legs. This will cause blood to pool in your legs because the clothing limits blood flow.    Some medicines such as hormones like birth control pills, some blood pressure medicines like calcium channel blockers (amlodipine) and steroids, some antidepressants like MAO inhibitors and tricyclics    Menstrual periods that cause you to retain fluids    Many types of renal disease    Liver failure or cirrhosis    Pregnancy, some swelling is normal, but a sudden increase in leg swelling or weight gain can be a sign of a dangerous complication of pregnancy    Poor nutrition    Thyroid disease  Medical treatment will depend on what is causing the swelling in your legs. Your healthcare provider may prescribe water pills (diuretics) to get rid of the extra fluid.  Home care  Follow these guidelines when caring for yourself at home:    Don't wear clothing like garters that is tight on your legs.    Keep your legs up while lying or sitting.    If infection, injury, or recent surgery is causing the swelling, stay off your legs as much as possible until symptoms get better.    If your healthcare provider says that your leg swelling is caused by venous insufficiency or varicose veins, don't sit or  one place for long periods of time. Take breaks and walk about every few hours. Brisk walking is a good exercise. It helps circulate the blood that has collected in your leg. Talk with your provider about using support stockings to stop daytime leg swelling.    If your provider says that heart disease is causing your leg swelling, follow a low-salt diet to stop extra fluid from staying in your body. You may also need medicine.  Follow-up care  Follow up with your healthcare provider, or as advised.  When to seek medical advice  Call your healthcare provider right away if any of these occur:    New shortness of breath or chest pain    Shortness of breath or chest pain that gets worse    Swelling in  both legs or ankles that gets worse    Swelling of the abdomen    Redness, warmth, or swelling in one leg    Fever of 100.4 F (38 C) or higher, or as directed by your healthcare provider    Yellow color to your skin or eyes    Rapid, unexplained weight gain    Having to sleep upright or use an increased number of pillows  Date Last Reviewed: 3/31/2016    1242-2522 [employer]. [address] [place] [address]. All rights reserved. This information is not intended as a substitute for professional medical care. Always follow your healthcare professional's instructions.              Coping with Edema  What is edema?  Edema is the build-up of fluid in the body, which causes swelling. Swelling most commonly occurs in the feet, ankles, lower legs or hands.  Swelling can occur in the belly or chest may be a sign of a more severe problem.  Certain medicines or conditions can make the swelling worse.  Symptoms include:    Feet and lower legs get larger when you sit or walk.    Hands feel tight when you make a fist.    When you push on the skin, skin stays dented.    Shiny, tight skin.    Fast weight gain.  How is it treated?  Your care team may give you a medicine to reduce the swelling.  They may also suggest that you meet with a dietitian. He or she can help with food choices to reduce the swelling.  What can I do about the swelling?    Place your feet above your heart 3 times a day: Sit with your feet up on a stool with a pillow. Sit on the bed or couch with two pillows under your feet.    Do not stand for long periods of time.    Wear loose-fitting clothes.    Do not cross your legs.    Reduce the salt in your diet.   These foods are high in salt:    Chips, soup    Frozen meals, TV dinners    Jewell, lunch meat, ham    Sauces (soy, canned spaghetti sauce)    Walk or do other exercise.    Wear compression stockings.    Drink water as normal.    Weigh yourself every day at the same time to keep track  of weight gain.  When should I call my care team?  Call your care team if:    You have a hard time breathing.    You gained 5 pounds or more in 1 week.    Your hands or feet feel cold when you touch them.    You are peeing very little or not at all.    Swelling is moving up your arms or legs.    Your tongue is swelling.    You cannot eat for more than a day  If you have any side effects, call us. We can help you manage these problems.  For more information, see:  www.[facility].com  www.cancer.org/treatment/treatmentsandsideeffects/physicalsideeffects/dealingwithsymptomsathome  www.cancer.gov/cancertopics/coping/chemotherapy-and-you  Comments:  __________________________________________  __________________________________________  __________________________________________  __________________________________________  __________________________________________  __________________________________________  __________________________________________  For informational purposes only. Not to replace the advice of your health care provider.  Copyright   2014 [employer]. All rights reserved. [employer] 348725 - REV 03/16.

## 2017-10-09 ENCOUNTER — CARE COORDINATION (OUTPATIENT)
Dept: CARE COORDINATION | Facility: CLINIC | Age: 67
End: 2017-10-09

## 2017-10-09 LAB
ANION GAP SERPL CALCULATED.3IONS-SCNC: 4 MMOL/L (ref 3–14)
BUN SERPL-MCNC: 11 MG/DL (ref 7–30)
CALCIUM SERPL-MCNC: 8.4 MG/DL (ref 8.5–10.1)
CHLORIDE SERPL-SCNC: 99 MMOL/L (ref 94–109)
CO2 SERPL-SCNC: 34 MMOL/L (ref 20–32)
CREAT SERPL-MCNC: 0.63 MG/DL (ref 0.66–1.25)
ERYTHROCYTE [DISTWIDTH] IN BLOOD BY AUTOMATED COUNT: 13.8 % (ref 10–15)
GFR SERPL CREATININE-BSD FRML MDRD: >90 ML/MIN/1.7M2
GLUCOSE BLDC GLUCOMTR-MCNC: 114 MG/DL (ref 70–99)
GLUCOSE BLDC GLUCOMTR-MCNC: 87 MG/DL (ref 70–99)
GLUCOSE BLDC GLUCOMTR-MCNC: 97 MG/DL (ref 70–99)
GLUCOSE SERPL-MCNC: 92 MG/DL (ref 70–99)
HCT VFR BLD AUTO: 48.5 % (ref 40–53)
HGB BLD-MCNC: 15.3 G/DL (ref 13.3–17.7)
INR PPP: 1.9 (ref 0.86–1.14)
MAGNESIUM SERPL-MCNC: 2.1 MG/DL (ref 1.6–2.3)
MCH RBC QN AUTO: 30.5 PG (ref 26.5–33)
MCHC RBC AUTO-ENTMCNC: 31.5 G/DL (ref 31.5–36.5)
MCV RBC AUTO: 97 FL (ref 78–100)
PLATELET # BLD AUTO: 222 10E9/L (ref 150–450)
POTASSIUM SERPL-SCNC: 4.1 MMOL/L (ref 3.4–5.3)
RBC # BLD AUTO: 5.02 10E12/L (ref 4.4–5.9)
SODIUM SERPL-SCNC: 137 MMOL/L (ref 133–144)
WBC # BLD AUTO: 4.9 10E9/L (ref 4–11)

## 2017-10-09 PROCEDURE — 40000901 ZZH STATISTIC WOC PT EDUCATION, 0-15 MIN

## 2017-10-09 PROCEDURE — 85027 COMPLETE CBC AUTOMATED: CPT | Performed by: INTERNAL MEDICINE

## 2017-10-09 PROCEDURE — 83735 ASSAY OF MAGNESIUM: CPT | Performed by: INTERNAL MEDICINE

## 2017-10-09 PROCEDURE — 25000132 ZZH RX MED GY IP 250 OP 250 PS 637: Performed by: INTERNAL MEDICINE

## 2017-10-09 PROCEDURE — 40000193 ZZH STATISTIC PT WARD VISIT

## 2017-10-09 PROCEDURE — 97110 THERAPEUTIC EXERCISES: CPT | Mod: GP

## 2017-10-09 PROCEDURE — 97530 THERAPEUTIC ACTIVITIES: CPT | Mod: GP

## 2017-10-09 PROCEDURE — 97535 SELF CARE MNGMENT TRAINING: CPT | Mod: GO | Performed by: OCCUPATIONAL THERAPIST

## 2017-10-09 PROCEDURE — 40000133 ZZH STATISTIC OT WARD VISIT: Performed by: OCCUPATIONAL THERAPIST

## 2017-10-09 PROCEDURE — 36592 COLLECT BLOOD FROM PICC: CPT | Performed by: INTERNAL MEDICINE

## 2017-10-09 PROCEDURE — 97166 OT EVAL MOD COMPLEX 45 MIN: CPT | Mod: GO | Performed by: OCCUPATIONAL THERAPIST

## 2017-10-09 PROCEDURE — 12000022 ZZH R&B SNF

## 2017-10-09 PROCEDURE — 00000146 ZZHCL STATISTIC GLUCOSE BY METER IP

## 2017-10-09 PROCEDURE — 80048 BASIC METABOLIC PNL TOTAL CA: CPT | Performed by: INTERNAL MEDICINE

## 2017-10-09 PROCEDURE — 25000125 ZZHC RX 250: Performed by: INTERNAL MEDICINE

## 2017-10-09 PROCEDURE — 99306 1ST NF CARE HIGH MDM 50: CPT | Performed by: INTERNAL MEDICINE

## 2017-10-09 PROCEDURE — 97162 PT EVAL MOD COMPLEX 30 MIN: CPT | Mod: GP

## 2017-10-09 PROCEDURE — 85610 PROTHROMBIN TIME: CPT | Performed by: INTERNAL MEDICINE

## 2017-10-09 RX ORDER — TAMSULOSIN HYDROCHLORIDE 0.4 MG/1
0.8 CAPSULE ORAL DAILY
Status: DISCONTINUED | OUTPATIENT
Start: 2017-10-10 | End: 2017-10-23

## 2017-10-09 RX ADMIN — METOPROLOL SUCCINATE 200 MG: 100 TABLET, FILM COATED, EXTENDED RELEASE ORAL at 08:25

## 2017-10-09 RX ADMIN — ATORVASTATIN CALCIUM 40 MG: 40 TABLET, FILM COATED ORAL at 08:25

## 2017-10-09 RX ADMIN — METFORMIN HYDROCHLORIDE 500 MG: 500 TABLET ORAL at 08:25

## 2017-10-09 RX ADMIN — Medication 5 UNITS: at 08:26

## 2017-10-09 RX ADMIN — LISINOPRIL 10 MG: 10 TABLET ORAL at 08:25

## 2017-10-09 RX ADMIN — METFORMIN HYDROCHLORIDE 500 MG: 500 TABLET ORAL at 18:50

## 2017-10-09 RX ADMIN — DILTIAZEM HYDROCHLORIDE 30 MG: 30 TABLET, FILM COATED ORAL at 08:26

## 2017-10-09 RX ADMIN — POTASSIUM CHLORIDE 40 MEQ: 1500 TABLET, EXTENDED RELEASE ORAL at 08:24

## 2017-10-09 RX ADMIN — Medication 1 G: at 08:25

## 2017-10-09 RX ADMIN — POTASSIUM CHLORIDE 40 MEQ: 1500 TABLET, EXTENDED RELEASE ORAL at 21:21

## 2017-10-09 RX ADMIN — FUROSEMIDE 80 MG: 40 TABLET ORAL at 08:26

## 2017-10-09 RX ADMIN — MULTIPLE VITAMINS W/ MINERALS TAB 1 TABLET: TAB at 08:25

## 2017-10-09 RX ADMIN — ASPIRIN 81 MG CHEWABLE TABLET 81 MG: 81 TABLET CHEWABLE at 08:25

## 2017-10-09 RX ADMIN — LEVOTHYROXINE SODIUM 150 MCG: 100 TABLET ORAL at 05:50

## 2017-10-09 RX ADMIN — WARFARIN SODIUM 12.5 MG: 7.5 TABLET ORAL at 18:50

## 2017-10-09 RX ADMIN — TAMSULOSIN HYDROCHLORIDE 0.4 MG: 0.4 CAPSULE ORAL at 08:25

## 2017-10-09 ASSESSMENT — ACTIVITIES OF DAILY LIVING (ADL): PREVIOUS_RESPONSIBILITIES: MEAL PREP;HOUSEKEEPING;LAUNDRY;SHOPPING;MEDICATION MANAGEMENT;FINANCES

## 2017-10-09 NOTE — PLAN OF CARE
Problem: Patient Care Overview  Goal: Plan of Care/Patient Progress Review  Physical Therapy Discharge Summary     Reason for therapy discharge:    Discharged to transitional care facility.     Progress towards therapy goal(s). See goals on Care Plan in [facility] electronic health record for goal details.  Goals partially met.  Barriers to achieving goals:   discharge from facility.     Therapy recommendation(s):    Continued therapy is recommended.  Rationale/Recommendations:  to progress functional mobility, strength and activity tolerance.

## 2017-10-09 NOTE — PLAN OF CARE
Problem: Goal Outcome Summary  Goal: Goal Outcome Summary  Outcome: No Change  Pt is alert and oriented, able to make needs known. Pt voided on 3 occasions with each amount varying from 1000 ml to 1100 ml, see flowsheet for details. Bladder was scanned for 325 ml after 1 hour after he voided. Pt voided another 1000 ml and bladder was re-scanned for 125 ml. Pt is drinking adequately without difficulty, excellent appetite noted. Order in place for psyche consult. ON Potassium and Magnesium protocol. Potassium at 2.1 and Mg at 4.1 today, no supplement is required at this time per protocol. Lymphedema wraps are intact on bilateral L/E. Order in place for the wound nurse to assess pt's wound on the L/E.   RT, Noy, called to inform of the problem pt was having with the C-PAP. Noy plans to come today to assess the patient and the equipment.

## 2017-10-09 NOTE — PROGRESS NOTES
Patient was discharged to [facility] so they will handle post discharge follow up cares and coordination            Discharge Disposition:   TCU:  [facility]

## 2017-10-09 NOTE — PLAN OF CARE
"Problem: Goal Outcome Summary  Goal: Goal Outcome Summary  Outcome: No Change  Pt.new admit yesterday. A&Ox4. Appears to be sleeping well. Full face cpap in use through the night. Lymph wraps to BLEs. Has TL open-ended power picc RUE. Mg./K+ replacement protocol. Pt.uses call light approp. Requested to be bladder scanned prior to voiding = 87mls. Needed assist with urinal placement and pt.voided 30mls.clear, straw-colored urine. Denies pain or discomfort. No c/o's CP/SOB.    0615 - Pt.currently awake and watching TV. Cpap off - pt.c/o cpap uncomfortable during the night, stated it made his mouth and lips too dry, needing to take sips of water during the night and this was the first time this has happened, \"the one I was on, on the other unit, never did that\". Writer called and spoke with RT: someone will come by today and see if can fix the problem. Pt.made aware. Currently on O2 via NC with sats 98%.  "

## 2017-10-09 NOTE — PROGRESS NOTES
" 10/09/17 120   Quick Adds   Quick Adds Certification   Type of Visit Initial Occupational Therapy Evaluation   Living Environment   Lives With alone   Living Arrangements apartment   Home Accessibility tub/shower is not walk in;other (see comments)  (elevators to 16th floor)   Number of Stairs to Enter Home 0   Number of Stairs Within Home 0   Transportation Available Medicaid transportation   Living Environment Comment OT: Pt reports he does not leave apartment, has groceries delivered, minimal cleaning, does not shower (sponge bathe only). Pt reports using a cane prior to admit.   Self-Care   Dominant Hand right   Usual Activity Tolerance fair   Current Activity Tolerance poor   Regular Exercise no   Equipment Currently Used at Home cane, straight   Activity/Exercise/Self-Care Comment OT: Pt previously I with self cares but minimal. Walks approx 900 yards to computer lab up to 3x per wk   Functional Level Prior   Ambulation 1-->assistive equipment   Transferring 1-->assistive equipment   Toileting 0-->independent   Bathing 0-->independent   Dressing 0-->independent   Eating 0-->independent   Communication 0-->understands/communicates without difficulty   Swallowing 0-->swallows foods/liquids without difficulty   Cognition 0 - no cognition issues reported   Fall history within last six months yes   Number of times patient has fallen within last six months 2  (BLE weakness and getting out,LOB)   Which of the above functional risks had a recent onset or change? ambulation;transferring;toileting;bathing;dressing;cognition;fall history   Prior Functional Level Comment OT: PLOF was mod I with use of SEC. Mainly stayed in apartment, sponge bathed, managed own medications, used store to door for groceries       Present no   Language english   General Information   Onset of Illness/Injury or Date of Surgery - Date 09/23/17   Referring Physician Dr. Mathew Chambers   Patient/Family Goals Statement \"I " "want to get back to walking around like I used to\"   Additional Occupational Profile Info/Pertinent History of Current Problem Pt is a 67 year old male with hx of morbid obesity, atrial fib, HFrEF, diabetes, PVD, and untreated depression, cheast pain/SOB. Now with hypoxic/hypercarbic respiratory failure and hypotension requiring initiation of BiPAP and pressors   Precautions/Limitations fall precautions   Weight-Bearing Status - LUE full weight-bearing   Weight-Bearing Status - RUE full weight-bearing   Weight-Bearing Status - LLE full weight-bearing   Weight-Bearing Status - RLE full weight-bearing   Heart Disease Risk Factors Diabetes;High blood pressure;Lack of physical activity;Overweight;Stress;Medical history;Gender;Age   General Observations OT: Pt sitting up in bed, pleasant on 2 liters of o2   General Info Comments OT: Pt does not use O2 at baseline. Activity orders: up in thor 3x/day and up with assist   Cognitive Status Examination   Orientation orientation to person, place and time   Level of Consciousness alert   Able to Follow Commands mild impairment   Personal Safety (Cognitive) at risk behaviors demonstrated   Memory intact   Attention Distractible during evaluation;Quiet environment required   Organization/Problem Solving Sequencing impaired;Problem solving impaired   Executive Function Initiation impaired;Cognitive flexibility impaired   Cognitive Comment OT: Pt cognition appears intact but scattered. Will further assess   Visual Perception   Visual Perception Wears glasses   Visual Perception Comments OT: reports no change in vision. Will further assess   Sensory Examination   Sensory Comments BLE numbness   Pain Assessment   Patient Currently in Pain Yes, see Vital Sign flowsheet  (LE especially with pressure)   Integumentary/Edema   Integumentary/Edema Comments BLE wrapped WOC following   Posture   Posture forward head position;protracted shoulders;kyphosis   Range of Motion (ROM)   ROM " Comment ROM limited d/t body habitus   Strength   Strength Comments BUE 4-/5   Hand Strength   Left hand  (pounds) 83 pounds   Right hand  (pounds) 90 pounds   Hand Strength Comments good grasp for holding walker or arm rests   Muscle Tone Assessment   Muscle Tone Quick Adds No deficits were identified   Coordination   Coordination Comments B UE finger to thumb opposition slow but WFL   Mobility   Bed Mobility Bed mobility skill: Supine to sit   Bed Mobility Skill: Supine to Sit   Level of Marathon: Supine/Sit moderate assist (50% patients effort)   Physical Assist/Nonphysical Assist: Supine/Sit set-up required;supervision;verbal cues;1 person assist   Assistive Device: Supine/Sit bedrail   Transfer Skills   Transfer Comments Pt reports poor татьяна cares at home, using commode and urinal in room.   Transfer Skill: Bed to Chair/Chair to Bed   Level of Marathon: Bed to Chair minimum assist (75% patients effort)   Physical Assist/Nonphysical Assist: Bed to Chair set-up required;supervision;verbal cues;1 person assist   Weight-Bearing Restrictions full weight-bearing   Assistive Device - Transfer Skill Bed to Chair Chair to Bed Rehab Eval rolling walker   Transfer Skill: Sit to Stand   Level of Marathon: Sit/Stand moderate assist (50% patients effort)   Physical Assist/Nonphysical Assist: Sit/Stand set-up required;supervision;verbal cues;1 person assist   Transfer Skill: Sit to Stand full weight-bearing   Assistive Device for Transfer: Sit/Stand rolling walker   Toilet Transfer   Toilet Transfer Comments Standard toilet seat at home with no grab bars, will further assess   Transfer Skill: Toilet Transfer   Level of Marathon: Toilet moderate assist (50% patients effort)   Physical Assist/Nonphysical Assist: Toilet set-up required;supervision;verbal cues;1 person assist   Weight-Bearing Restrictions: Toilet full weight-bearing   Tub/Shower Transfer   Tub/Shower Transfer Comments tub/shower combo,  does not use, will further assess   Balance   Balance Comments Pt demos mild posterior tilt with static standing and dynamic balance. Defer to PT abel for further balance assessment   Bathing   Level of Armstrong contact guard   Physical Assist/Nonphysical Assist set-up required;supervision;verbal cues;1 person assist   Upper Body Dressing   Level of Armstrong: Dress Upper Body stand-by assist   Lower Body Dressing   Level of Armstrong: Dress Lower Body maximum assist (25% patients effort)   Toileting   Level of Armstrong: Toilet moderate assist (50% patients effort)   Grooming   Level of Armstrong: Grooming contact guard   Eating/Self Feeding   Level of Armstrong: Eating independent   Instrumental Activities of Daily Living (IADL)   Previous Responsibilities meal prep;housekeeping;laundry;shopping;medication management;finances   Activities of Daily Living Analysis   Impairments Contributing to Impaired Activities of Daily Living balance impaired;cognition impaired;fear and anxiety;flexibility decreased;pain;strength decreased   General Therapy Interventions   Planned Therapy Interventions ADL retraining;IADL retraining;balance training;bed mobility training;cognition;strengthening;transfer training;visual perception;home program guidelines;progressive activity/exercise;risk factor education   Clinical Impression   Criteria for Skilled Therapeutic Interventions Met yes, treatment indicated   OT Diagnosis OT: decreased I in ADL d/t deconditioning   Influenced by the following impairments decreased strength and activity tolerance, impaired fxl balance, pain   Assessment of Occupational Performance 3-5 Performance Deficits   Identified Performance Deficits dressing, toileting, grooming, fxl endurance, cognition   Clinical Decision Making (Complexity) Moderate complexity   Therapy Frequency daily   Predicted Duration of Therapy Intervention (days/wks) 10 days   Anticipated Equipment Needs at Discharge  bathing equipment;dressing equipment;other (see comments)  (FWW)   Anticipated Discharge Disposition Home;Home with Assist;Home with Home Therapy   Risks and Benefits of Treatment have been explained. Yes   Patient, Family & other staff in agreement with plan of care Yes   Clinical Impression Comments Pt would benefit from skilled OT to address basic ADL self cares and IADLs for safe home DC   Therapy Certification   Start of Care Date 09/23/17   Certification date from 10/09/17   Certification date to 11/09/17   Medical Diagnosis morbid obesity, see above for PMHX    Certification I certify the need for these services furnished under this plan of treatment and while under my care.  (Physician co-signature of this document indicates review and certification of the therapy plan).   Total Evaluation Time   Total Evaluation Time (Minutes) 30

## 2017-10-09 NOTE — PLAN OF CARE
Problem: TCU Patient Goals  Goal: TCU Plan of Care  Patient is a 67year old  Male admitted to room 426 via stretcher. The  patient is alert and oriented X 3. See Epic for VS and assessment. Patient is able to transfer with two assist using walker. Patient was settled into their room, shown call light, tv, mealtimes etc. Oriented to unit. Full skin assessment except BLE with lymphedema wraps. Declined dressing change as new dressing was applied prior to dc to TCU. Bruised RUE. Redness R abd panus. Two dark colored non draining skintag left posterior thigh. Denies pain. Edema toes. CMS intact. Will continue monitoring pain level and VS. Notifying MD with any concerns. Follow MD orders for cares and medications.     Level of Schooling: College/ some masters credits  Ethnicity:  Marital Status:Single  Dentures:No  Hearing Aid:No  Smoker: No  Glasses: Yes  Occupation: None  Falls 0-1 mo: 1                Advanced Care Directive Referral to Social Work? Yes

## 2017-10-09 NOTE — PLAN OF CARE
Problem: Goal Outcome Summary  Goal: Goal Outcome Summary  Outcome: Therapy, progress toward functional goals as expected  OT - Evaluation completed. Treatment initiated. Pt living alone in apartment, appears somewhat isolated, has groceries delivered, gave up driving and relies on [employer] [facility] to attend medical appointments. Pt appears motivated to work on therapy goals and return to baseline function. Therapist has offered  services but pt declines at this time.

## 2017-10-09 NOTE — PLAN OF CARE
Problem: Goal Outcome Summary  Goal: Goal Outcome Summary  BP decreased to 80/58 during therapy session when pt transferred from bed to [facility]. Dr kee was updated, Coreg was discontinued. Currently denies dizziness, will continue to assess status.

## 2017-10-09 NOTE — PROGRESS NOTES
Focus:  Wound consult  D:  Attempted to see pateint per requested MD referral.  He is wanting to be assessed by Lymphedema specialist before we see him as he verbalized that is what was communicated to him before transfer here from [place]  P:  Will re attempt to see patient after lymphedema has seen him or when he will consent to our assessment  ( face to face time= 5 minutes)

## 2017-10-09 NOTE — PLAN OF CARE
Problem: Patient Care Overview  Goal: Plan of Care/Patient Progress Review     Occupational Therapy Discharge Summary     Reason for therapy discharge:    Discharged to transitional care facility.     Progress towards therapy goal(s). See goals on Care Plan in [facility] electronic health record for goal details.  Goals partially met.  Barriers to achieving goals:   discharge from facility.     Therapy recommendation(s):    Continued therapy is recommended.  Rationale/Recommendations:  continue OT at TCU.

## 2017-10-09 NOTE — PHARMACY-MEDICATION REGIMEN REVIEW
Pharmacy Medication Regimen Review  Clarke Moreira is a 67 year old male who is currently in the Transitional Care Unit.    Assessment: All medications have an appropriate indications, durations and no unnecessary use was found    Plan:   No action needed at this time.  1. Continue monitoring INR for warfarin. Goal INR 2-3. Pharmacy to dose warfarin daily.  2. Blood pressure hold parameters in place.    Attending provider will be sent this note for review.  If there are any emergent issues noted above, pharmacist will contact provider directly by phone.      Pharmacy will periodically review the resident's medication regimen for any PRN medications not administered in > 72 hours and discontinue them. The pharmacist will discuss gradual dose reductions of psychopharmacologic medications with interdisciplinary team on a regular basis.    Please contact pharmacy if the above does not answer specific medication questions/concerns.  Mirta CurtisD, BCPS    Background:  A pharmacist has reviewed all medications and pertinent medical history today.  Medications were reviewed for appropriate use and any irregularities found are listed with recommendations.      Current Facility-Administered Medications:      warfarin (COUMADIN) tablet 12.5 mg, 12.5 mg, Oral, ONCE at 18:00, Mathew Olson MD     [START ON 10/10/2017] tamsulosin (FLOMAX) capsule 0.8 mg, 0.8 mg, Oral, Daily, Mathwe Olson MD     aspirin chewable tablet 81 mg, 81 mg, Oral, Daily, Mathew Olson MD, 81 mg at 10/09/17 0825     atorvastatin (LIPITOR) tablet 40 mg, 40 mg, Oral, Daily, Mathew Olson MD, 40 mg at 10/09/17 0825     furosemide (LASIX) tablet 80 mg, 80 mg, Oral, Daily, Mathew Olson MD, 80 mg at 10/09/17 0826     ipratropium - albuterol 0.5 mg/2.5 mg/3 mL (DUONEB) neb solution 3 mL, 3 mL, Nebulization, Q4H PRN, Mathew Olson MD     levothyroxine (SYNTHROID/LEVOTHROID) tablet 150 mcg, 150 mcg, Oral, QAM AC, Mathew Olson MD, 150 mcg at  10/09/17 0550     lisinopril (PRINIVIL/ZESTRIL) tablet 10 mg, 10 mg, Oral, Daily, Mathew Olson MD, 10 mg at 10/09/17 0825     metFORMIN (GLUCOPHAGE) tablet 500 mg, 500 mg, Oral, BID w/meals, Mathew Olson MD, 500 mg at 10/09/17 0825     metoprolol (TOPROL-XL) 24 hr tablet 200 mg, 200 mg, Oral, Daily, Mathew Olson MD, 200 mg at 10/09/17 0825     multivitamin, therapeutic with minerals (THERA-VIT-M) tablet 1 tablet, 1 tablet, Oral, Daily, Mathew Olson MD, 1 tablet at 10/09/17 0825     fish oil-omega-3 fatty acids capsule 1 g, 1 g, Oral, Daily, Mathew Olson MD, 1 g at 10/09/17 0825     potassium chloride SA (K-DUR/KLOR-CON M) CR tablet 40 mEq, 40 mEq, Oral, BID, Mathew Olson MD, 40 mEq at 10/09/17 0824     medication instructions, , Does not apply, Continuous PRN, Mathew Olson MD     acetaminophen (TYLENOL) tablet 650 mg, 650 mg, Oral, Q4H PRN, Mathew Olson MD, 650 mg at 10/08/17 2157     acetaminophen (TYLENOL) Suppository 650 mg, 650 mg, Rectal, Q4H PRN, Mathew Olson MD     Patient is already receiving anticoagulation with heparin, enoxaparin (LOVENOX), warfarin (COUMADIN)  or other anticoagulant medication, , Does not apply, Continuous PRN, Mathew Olson MD     tuberculin injection 5 Units, 5 Units, Intradermal, Q21 Days, Mathew Olson MD, 5 Units at 10/09/17 0826     [START ON 10/11/2017] - Skin Test Reading -, , Does not apply, Q21 Days, Mathew Olson MD     alum & mag hydroxide-simethicone (MYLANTA ES/MAALOX  ES) suspension 30 mL, 30 mL, Oral, Q4H PRN, Mathew Olson MD     sennosides (SENOKOT) tablet 1-2 tablet, 1-2 tablet, Oral, BID PRN, Mathew Olson MD     ondansetron (ZOFRAN-ODT) ODT tab 4 mg, 4 mg, Oral, Q6H PRN **OR** ondansetron (ZOFRAN) injection 4 mg, 4 mg, Intravenous, Q6H PRN, Mathew Olson MD     potassium chloride SA (K-DUR/KLOR-CON M) CR tablet 20-40 mEq, 20-40 mEq, Oral, Q2H PRN, Mathew Olson MD     potassium chloride (KLOR-CON) Packet 20-40 mEq, 20-40 mEq, Oral or Feeding  Tube, Q2H PRN, Mathew Olson MD     potassium chloride 10 mEq in 100 mL intermittent infusion, 10 mEq, Intravenous, Q1H PRN, Mathew Olson MD     potassium chloride 10 mEq in 100 mL intermittent infusion with 10 mg lidocaine, 10 mEq, Intravenous, Q1H PRNWhitney Rahul, MD     potassium chloride 20 mEq in 100 mL NON-STANDARD CONC intermittent infusion, 20 mEq, Intravenous, Q1H PRN, Mathew Olson MD     magnesium sulfate 4 g in 100 mL sterile water (premade), 4 g, Intravenous, Q4H PRN, Mathew Olson MD     Warfarin Therapy Reminder (Check START DATE - warfarin may be starting in the FUTURE), 1 each, Does not apply, Continuous PRN, Mathew Olson MD  No current outpatient prescriptions on file.   PMH: morbid obesity, atrial fibrillation, HFrEF, diabetes, PVD, and untreated depression that was admitted following a fall and CP/SOB on 9/23.

## 2017-10-09 NOTE — PLAN OF CARE
Problem: Goal Outcome Summary  Goal: Goal Outcome Summary  Physical Therapy: Evaluation complete, treatment initiated. Pt ambulated 7' x 2 with FWW and CGA on 1 L of oxygen (requires multiple standing rest breaks and heavy use of UEs with use of FWW). Requires min to mod assist for sit<>stand transfers. Pt completed seated LE strengthening exercises at EOB. Oxygen dropped to 85% during LE exercises on 1 L of oxygen (increased to 95% with deep breathing).

## 2017-10-09 NOTE — PROGRESS NOTES
I, Linda Kirkpatrick, DPT agree with the assessment and POC of Demetra Gillespie, SPT.     10/09/17 0801   Quick Adds   Quick Adds Certification   Type of Visit Initial PT Evaluation   Living Environment   Lives With alone   Living Arrangements apartment   Home Accessibility tub/shower is not walk in   Number of Stairs to Enter Home 0   Number of Stairs Within Home 0   Stair Railings at Home none   Transportation Available Medicaid transportation   Living Environment Comment PT: Per OT note, Pt reports he does not leave apartment, has groceries delivered, minimal cleaning, does not shower (sponge bathe only). Pt reports using a cane prior to admit.   Self-Care   Dominant Hand right   Usual Activity Tolerance fair   Current Activity Tolerance poor   Regular Exercise no   Equipment Currently Used at Home cane, straight   Activity/Exercise/Self-Care Comment PT: Per OT note, Pt previously I with self cares but minimal. Walks approx 900 yards to computer lab up to 3x per wk   Functional Level Prior   Ambulation 1-->assistive equipment   Transferring 1-->assistive equipment   Toileting 1-->assistive equipment   Bathing 0-->independent   Dressing 0-->independent   Eating 0-->independent   Communication 0-->understands/communicates without difficulty   Swallowing 0-->swallows foods/liquids without difficulty   Cognition 0 - no cognition issues reported   Fall history within last six months yes   Number of times patient has fallen within last six months 2   Which of the above functional risks had a recent onset or change? fall history;ambulation;transferring   Prior Functional Level Comment PT: Per OT note, pt was mod I with use of SEC. Mainly stayed in apartment, sponge bathed, managed own medications, used store to door for groceries   General Information   Onset of Illness/Injury or Date of Surgery - Date 09/23/17   Referring Physician Mathew Olson MD and Dr. Luis Nova   Patient/Family Goals Statement PT: Pt would like to  improve his ability to walk. Improve ability to get in/out of bed. Would like legs to feel stronger.    Pertinent History of Current Problem (include personal factors and/or comorbidities that impact the POC) 67 year old type II diabetic male admitted to the ED on 9/23/17 via EMS for evaluation of chest pain and fall. Patient states that on 9/23/17, he was sitting in his chair and he tried to get up, but fell onto his right knee and then could not stand. Upon admission to the ED, pt also reported chest pain that started 30 minutes after falling and trying to get up. Pt has a history of a MI x2 and morbid obesity. Other comorbidities include the following: acute hypercarbic respiratory failure, a-fib with RVR, and hypotension secondary to hypovolemia. Past medical hx is significant for obstructive sleep apnea, obesity hypoventilization syndrome, and long standing depression. Lower extremity weakness is chronic and he was scored for ataxia only because he could not fully move his legs. Bilateral lower extremity decrease in sensation appears to be his baseline and is unlikely to be due to stroke.   Precautions/Limitations no known precautions/limitations   Weight-Bearing Status - LUE full weight-bearing   Weight-Bearing Status - RUE full weight-bearing   Weight-Bearing Status - LLE full weight-bearing   Weight-Bearing Status - RLE full weight-bearing   Heart Disease Risk Factors Diabetes;Lack of physical activity;Dislipidemia;Overweight;Medical history;Gender;Age   General Observations PT: pt was agreeable to participate in physical therapy. He is currently on 1 L of oxygen. States that his lack of sensation in B feet is new and not his baseline.    Cognitive Status Examination   Orientation orientation to person, place and time   Level of Consciousness alert   Follows Commands and Answers Questions 100% of the time   Personal Safety and Judgment intact   Memory intact   Pain Assessment   Patient Currently in Pain  (Reports occasional R heel pain w/ gait)   Integumentary/Edema   Integumentary/Edema Comments PT: Lymphedema wraps in place. Per chart, pt has wound on R heel and on dorsum of L foot.    Posture    Posture Kyphosis;Forward head position   Range of Motion (ROM)   ROM Comment PT: LE ROM WFL for sit<>stand transfers and ambulation. B knee flexion: 0-90. B hip flexion: 0-100. Lacks B dorsiflexion and plantar flexion.    Strength   Strength Comments PT: Generalized weakness in B LEs. B hip flexion: 3+/5. B knee flexion: 3+/5. B knee flexion: 2+/5. B hip abduction/adduction: 4/5. B ankle dorsiflexion: 2+/5.      Bed Mobility   Bed Mobility Comments PT: Not assessed. Pt does not own a bed at home (sleeps in recliner).    Transfer Skills   Transfer Comments PT: Pt required mod assist for initial transfer from recliner to FWW (required therapist to stabilize the walker). Second transfer from EOB to FWW required min assist with therapist stabilizing FWW. Pt rocks back and forth to gather momentum prior to initiating transfer. Vitals taken in seated position prior to sit<>stand transfer were as follows: SpO2 is 93 on 1 L of oxygen in seated. HR 90 bpm in seated. BP 86/59.    Gait   Gait Comments PT: Pt ambulated 7' x 2 with FWW and CGA. Gait speed is significantly slow and pt requires heavy UE use on FWW to hold himself upright. Requires standing rest break and deep breathing every few steps. Demonstrates step-to gait pattern on R.    Balance   Balance Comments PT: Pt was able to maintain seated balance on EOB for several minutes during LE exercises. No LOB.   Sensory Examination   Sensory Perception Comments PT: Pt reports increased sensation to light touch on the R calf compared to the L. Pt reports that he cannot feel his feet. Impaired proprioception at B great toe and B ankles.   Coordination   Coordination Comments PT: Coordination appears intact; pt was able to complete B UE pronation/supination (no signs of  dysdiadochokinesis) and finger-to-nose testing without difficulty. Unable to complete heel slide, but this is limited by reduced ROM and strength in B LEs.   Modality Interventions   Planned Modality Interventions Cryotherapy   General Therapy Interventions   Planned Therapy Interventions balance training;bed mobility training;gait training;manual therapy;strengthening;transfer training;home program guidelines   Clinical Impression   Criteria for Skilled Therapeutic Intervention yes, treatment indicated   PT Diagnosis Decreased functional mobility   Influenced by the following impairments Decreased LE strength, decreased proprioception and sensation of B LEs, R heel pain secondary to wound   Functional limitations due to impairments Difficulty with sit<>stand transfers and ambulation.    Clinical Presentation Stable/Uncomplicated   Clinical Presentation Rationale Clinical judement of functional performance: pt has multiple participation restrictions as listed above. He presents with multiple co-morbidities and reports limited functional mobility prior to admit.    Clinical Decision Making (Complexity) Moderate complexity   Therapy Frequency` daily   Predicted Duration of Therapy Intervention (days/wks) 2 weeks   Anticipated Equipment Needs at Discharge front wheeled walker   Anticipated Discharge Disposition Home with Home Therapy   Risk & Benefits of therapy have been explained Yes   Patient, Family & other staff in agreement with plan of care Yes   Clinical Impression Comments PT: Pt presents with decreased functional mobility including difficulty with sit<>stand transfers and ambulation secondary to B LE weakness, impaired sensation and proprioception, and R heel pain secondary to wound. Skilled PT is medically necessary for resolution of functional limitations in order to return to prior level of function. Will incorporate lymphedema and wound care for management of patient's B LE edema and R heel wound in  addition to a consult from the orthotist regarding proper foot wear.    Therapy Certification   Start of care date 10/09/17   Certification date from 10/09/17   Certification date to 11/08/17   Medical Diagnosis Atrial fibrillation, ICD-10 code: I48.91    Certification I certify the need for these services furnished under this plan of treatment and while under my care.  (Physician co-signature of this document indicates review and certification of the therapy plan).    Total Evaluation Time   Total Evaluation Time (Minutes) 40

## 2017-10-09 NOTE — H&P
History and Physical     Clarke Moreira MRN# [id number]   YOB: 1950 Age: 67 year old      Date of Admission:  10/8/2017    Primary care provider: Matthias Sanchez          Assessment and Plan:   Clarke Sotelo is a 67 year old male with a history notable for morbid obesity, atrial fibrillation, HFrEF, diabetes, PVD, and untreated depression that was admitted following a fall and CP/SOB on 9/23.  Transferred to MICU on 9/24 for AMS with acute hypoxic/hypercarbic respiratory failure and hypotension requiring initiation of BiPAP and pressors.  Much improved following nasal airway and BiPAP, off pressors and transferred to general medicine for further management.      #Acute on Chronic Hypercapnic Respiratory Failure  #Evidence of Pulmonary Hypertension  #Probable Obesity hypoventilation syndrome  Developed AMS, A fib with RVR to 150-160s and respiratory distress early AM 9/24/17. Sudden decompensated respiratory status could have been secondary to flash pulmonary edema secondary to a-fib with RVR, exacerbation of an obstructive or restrictive lung pathology, infectious causes (although infectious respiratory panel negative) and worsening of obesity hypoventilation syndrome. Improved with BIPAP. Currently on CPAP.  - Continue CPAP at night,  - Goal O2 to keep saturations > 88%  - will keep on DuoNebs QID/PRN   - Incentive spirometry    - will need to go home with CPAP at the time of leaving TCU     #Atrial fibrillation with RVR - improving  History of A fib in the past, not anticoagulated at home but on metoprolol for rate control. CHADS-VaSC 5.   Received  amiodarone load and 6hr drip on arrival to MICU when in afib with RVR and hypotension. However, his atrial fibrillation improved once respiratory status was stabilized. Rate improved with increase metoprolol RVR.  - Continue metoprolol 200mg PO XL  - also added Diltiazem 30 mg BID recently ( in last few days of discharge )   - Hold diltiazem as  blood pressure reading are soft.( SBP in 80s)  - continue to monitor HR and Blood pressure - as his Wt is down-we may need to down titrate his diuretics soon.  - Continue warfarin with pharmacy to monitor INR and coumadin dosing.         #History of HFrEF   #Pulmonary Edema  #CHF exacerbation  History of coronary artery disease/peripheral vascular disease per patient on admission. Last Echo 2008 with EF 40-45%, but on 09/24/17 recheck was described as normal. There might be a diastolic component and most suitable be HFpEF. He is on lisinopril, HCTZ, Metoprolol at home. During respiratory decompensation he did have CXR showing pulmonary edema. Which could secondary to HF exacerbation in the setting of atrial fibrillation.  Admission weight ~ 460lbs and dry weight around 415-420 lbs. Now 415-424lbs.   - On oral lasix 80mg PO daily   - continue on lisinopril 10 mg and Metoprolol   - Will need to follow with CHF clinic  Up on discharge     #Hypertension   Episodes of hypotension , SBP -80  Currently on lasix 80 mg + lisinopril 10 mg + Metoprolol 200 mg BID + Diltiazem 30 mg BID   Diltiazem was started recently .   Will dc  diltiazem and monitor .         #DMII  On Metformin at home. HgbA1c 6.5 on 8/14/17.  Resumed back on Metformin  Blood sugars very well controlled.  No need for SSI now      #Peripheral Vascular Disease  #Venous Stasis   Reports poor sensation up on admission which is improving now  Possibly have peripheral neuropathy due to Diabetes and chronic venous stasis  Consult lymphedema therapy       #Depression  History of Depression and PTSD with limited social support. Declined therapy and in hospital chaplaincy services. No antidepressants at home. Ongoing concern for recent DNR/DNI code Psychiatry evaluated patient and did not believe depression was factoring on DNI/DNR and possible comfort care decision. Palliative care is involved and at this moment patient is agreeable to continue management and  optimization of respiratory status as it is improving, but is still considering discontinuing BiPAP.   - Continue address goals of care with patient  - consult health psychology for ongoing support   - denies  suicidal thoughts     #Lower Extremity Cellulitis - improving  Bilateral lower extremity with stasis dermatitis and ulcers.  completed oral dose of keflex.  - WOC and lymphedema consulted     #Perisplenic Free Fluid  Noted on CT Chest in the ED 9/24, possible splenic laceration and perisplenic fluid collection following fall. Hgb has remained stable, no increase in abdominal distension, no abdominal pain. Evaluated by general surgery while in hospital. No intervention.       #Hypothyroidism  TSH on admission 3.11, on Synthroid 150 mcg at home  - Continue synthroid 150 mcg     #Hypercapnic Encephalopathy - resolved   - VBG if mental status change  - CO2 goal of ~60s    # Urinary retention with urgency :    could be BPH   on Flomax 0.4 mg will increase to 0.8 mg     continue to monitor       FEN: CHO consistent   Prophylaxis: DVT warfarin  Indication for psychotropic medications: none  PPD testing ordered                   Chief Complaint:   Morbid obesity          History of Present Illness:   Clarke Sotelo is a 67 year old male with a history notable for morbid obesity, atrial fibrillation, HFrEF, diabetes, PVD, and untreated depression that was admitted following a fall and CP/SOB on 9/23.  Transferred to MICU on 9/24 for AMS with acute hypoxic/hypercarbic respiratory failure and hypotension requiring initiation of BiPAP and pressors.  Much improved following nasal airway and BiPAP. He was transfer to TCU for ongoing rehab needs .  He is currently resting comfortably. Reports difficult urination. Denies short ness of breath or chest pain. No fever or chills.   Says he has been living in apartment. Says his furniture got damaged and had bedbugs. So he has been sleeping in his recliner chair for many years..  Says he had PTSD and had counseling between [id number] and willing to resume his health care. Want to take care of his health now on.                  Past Medical History:     Past Medical History:   Diagnosis Date     Basal cell carcinoma              Past Surgical History:     Past Surgical History:   Procedure Laterality Date     BIOPSY OF SKIN LESION       MOHS MICROGRAPHIC PROCEDURE       PICC INSERTION Right 09/24/2017    5fr TL Bard PICC, 51cm (1cm external) in the R medial brachial vein w/ tip in the SVC.             Social History:     Social History   Substance Use Topics     Smoking status: Never Smoker     Smokeless tobacco: Never Used     Alcohol use No      Comment: Former alcohol abuse. Quit 70s then quit later in 80s-present             Family History:     Family History   Problem Relation Age of Onset     Depression Mother      CANCER No family hx of      No family history of skin cancer             Immunizations:     Immunization History   Administered Date(s) Administered     Influenza (IIV3) 12/15/2005, 03/08/2007, 10/01/2010, 12/02/2011, 09/06/2017     Mantoux 10/09/2017     Tdap (Adacel,Boostrix) 03/08/2007            Allergies:   No Known Allergies          Medications:     Prescriptions Prior to Admission   Medication Sig Dispense Refill Last Dose     ipratropium - albuterol 0.5 mg/2.5 mg/3 mL (DUONEB) 0.5-2.5 (3) MG/3ML neb solution Take 1 vial (3 mLs) by nebulization every 4 hours as needed for wheezing 360 mL 3      warfarin (COUMADIN) 1 MG tablet Take 9 tablets (9 mg) by mouth daily 30 tablet 3      metoprolol (TOPROL-XL) 100 MG 24 hr tablet Take 2 tablets (200 mg) by mouth daily 30 tablet 12      diltiazem (CARDIZEM) 30 MG tablet Take 1 tablet (30 mg) by mouth 2 times daily 60 tablet 3      furosemide (LASIX) 80 MG tablet Take 1 tablet (80 mg) by mouth daily 90 tablet 3      tamsulosin (FLOMAX) 0.4 MG capsule Take 1 capsule (0.4 mg) by mouth daily 60 capsule 1      multivitamin,  therapeutic with minerals (MULTI-VITAMIN) TABS tablet Take 1 tablet by mouth daily   9/22/2017     aspirin 81 MG tablet Take 1 tablet (81 mg) by mouth daily 30 tablet 12 9/22/2017     atorvastatin (LIPITOR) 40 MG tablet Take 1 tablet (40 mg) by mouth daily 30 tablet 11 9/22/2017     levothyroxine (SYNTHROID/LEVOTHROID) 150 MCG tablet Take 1 tablet (150 mcg) by mouth daily 30 tablet 11 9/22/2017     lisinopril (PRINIVIL/ZESTRIL) 10 MG tablet Take 1 tablet (10 mg) by mouth daily 30 tablet 12 9/22/2017     metFORMIN (GLUCOPHAGE) 500 MG tablet Take 1 tablet (500 mg) by mouth 2 times daily (with meals) 60 tablet 11 9/22/2017     omega 3 1000 MG CAPS Take 1 g by mouth daily 90 capsule 3 9/22/2017             Review of Systems:   The 10 point Review of Systems is negative other than noted in the HPI           Physical Exam:   Vitals were reviewed  Patient Vitals for the past 8 hrs:   BP Temp Temp src Pulse Resp SpO2   10/09/17 0745 114/68 96.5  F (35.8  C) Oral 110 18 95 %   General: Patient lying comfortably in bed, NAD, wearing BiPAP mask  HEENT: EOMI, PERRL, no scleral icterus or injection, no bruits appreciated, difficult to appreciate JVD due to body habitus, MMM  CVS : RRR, no m/r/g appreciated.   Respiratory: reduced lung sounds bilaterally across all fields, no wheezing or crackles  GI: Obese, NT, no organomegaly, no signs of acute abdomen  Extremities: +2 edema bilaterally, lymphedema wraps on  Neuro: AOx3,  Moving all extremities sponteneously  SKIN: has heal ulcers per chart, had lymphedema wraps            Data:     BMP  Recent Labs  Lab 10/09/17  0643 10/08/17  0654 10/07/17  0726 10/06/17  2149    139 139 136   POTASSIUM 4.1 4.0 3.9 4.3   CHLORIDE 99 97 98 95   CHERI 8.4* 8.6 8.6 8.7   CO2 34* 38* 37* 38*   BUN 11 9 7 9   CR 0.63* 0.65* 0.69 0.80   GLC 92 94 93 94     CBC  Recent Labs  Lab 10/09/17  0643 10/08/17  0654 10/05/17  0348   WBC 4.9  --   --    RBC 5.02  --   --    HGB 15.3  --   --    HCT 48.5   --   --    MCV 97  --   --    MCH 30.5  --   --    MCHC 31.5  --   --    RDW 13.8  --   --     222 205     INR  Recent Labs  Lab 10/09/17  0643 10/08/17  0654 10/07/17  0726 10/06/17  0903   INR 1.90* 1.93* 2.03* 1.91*       [facility]   Hospitalist - Internal Medicine

## 2017-10-10 ENCOUNTER — APPOINTMENT (OUTPATIENT)
Dept: LAB | Facility: CLINIC | Age: 67
End: 2017-10-10
Attending: INTERNAL MEDICINE
Payer: COMMERCIAL

## 2017-10-10 LAB
GLUCOSE BLDC GLUCOMTR-MCNC: 105 MG/DL (ref 70–99)
GLUCOSE BLDC GLUCOMTR-MCNC: 99 MG/DL (ref 70–99)
INR PPP: 1.97 (ref 0.86–1.14)

## 2017-10-10 PROCEDURE — 97535 SELF CARE MNGMENT TRAINING: CPT | Mod: GP | Performed by: PHYSICAL THERAPIST

## 2017-10-10 PROCEDURE — 40000193 ZZH STATISTIC PT WARD VISIT

## 2017-10-10 PROCEDURE — 99212 OFFICE O/P EST SF 10 MIN: CPT

## 2017-10-10 PROCEDURE — 97530 THERAPEUTIC ACTIVITIES: CPT | Mod: GP

## 2017-10-10 PROCEDURE — 12000022 ZZH R&B SNF

## 2017-10-10 PROCEDURE — 00000146 ZZHCL STATISTIC GLUCOSE BY METER IP

## 2017-10-10 PROCEDURE — L3260 AMBULATORY SURGICAL BOOT EAC: HCPCS

## 2017-10-10 PROCEDURE — 40000133 ZZH STATISTIC OT WARD VISIT: Performed by: OCCUPATIONAL THERAPIST

## 2017-10-10 PROCEDURE — 97110 THERAPEUTIC EXERCISES: CPT | Mod: GP

## 2017-10-10 PROCEDURE — 36592 COLLECT BLOOD FROM PICC: CPT | Performed by: INTERNAL MEDICINE

## 2017-10-10 PROCEDURE — 85610 PROTHROMBIN TIME: CPT | Performed by: INTERNAL MEDICINE

## 2017-10-10 PROCEDURE — 25000128 H RX IP 250 OP 636: Performed by: INTERNAL MEDICINE

## 2017-10-10 PROCEDURE — 97140 MANUAL THERAPY 1/> REGIONS: CPT | Mod: GP | Performed by: PHYSICAL THERAPIST

## 2017-10-10 PROCEDURE — 97535 SELF CARE MNGMENT TRAINING: CPT | Mod: GO | Performed by: OCCUPATIONAL THERAPIST

## 2017-10-10 PROCEDURE — 40000193 ZZH STATISTIC PT WARD VISIT: Performed by: PHYSICAL THERAPIST

## 2017-10-10 PROCEDURE — 25000132 ZZH RX MED GY IP 250 OP 250 PS 637: Performed by: INTERNAL MEDICINE

## 2017-10-10 RX ORDER — LIDOCAINE 40 MG/G
CREAM TOPICAL
Status: DISCONTINUED | OUTPATIENT
Start: 2017-10-10 | End: 2017-10-27 | Stop reason: HOSPADM

## 2017-10-10 RX ORDER — HEPARIN SODIUM,PORCINE 10 UNIT/ML
5-10 VIAL (ML) INTRAVENOUS EVERY 24 HOURS
Status: DISCONTINUED | OUTPATIENT
Start: 2017-10-10 | End: 2017-10-27 | Stop reason: HOSPADM

## 2017-10-10 RX ORDER — POTASSIUM CHLORIDE 750 MG/1
20-40 TABLET, EXTENDED RELEASE ORAL
Status: DISCONTINUED | OUTPATIENT
Start: 2017-10-10 | End: 2017-10-27 | Stop reason: HOSPADM

## 2017-10-10 RX ORDER — HEPARIN SODIUM,PORCINE 10 UNIT/ML
5-10 VIAL (ML) INTRAVENOUS
Status: DISCONTINUED | OUTPATIENT
Start: 2017-10-10 | End: 2017-10-27 | Stop reason: HOSPADM

## 2017-10-10 RX ORDER — WARFARIN SODIUM 7.5 MG/1
15 TABLET ORAL
Status: COMPLETED | OUTPATIENT
Start: 2017-10-10 | End: 2017-10-10

## 2017-10-10 RX ORDER — POTASSIUM CHLORIDE 750 MG/1
40 TABLET, EXTENDED RELEASE ORAL 2 TIMES DAILY
Status: DISCONTINUED | OUTPATIENT
Start: 2017-10-10 | End: 2017-10-27 | Stop reason: HOSPADM

## 2017-10-10 RX ADMIN — Medication 1 G: at 08:06

## 2017-10-10 RX ADMIN — METFORMIN HYDROCHLORIDE 500 MG: 500 TABLET ORAL at 08:07

## 2017-10-10 RX ADMIN — METOPROLOL SUCCINATE 200 MG: 100 TABLET, FILM COATED, EXTENDED RELEASE ORAL at 08:07

## 2017-10-10 RX ADMIN — TAMSULOSIN HYDROCHLORIDE 0.8 MG: 0.4 CAPSULE ORAL at 08:07

## 2017-10-10 RX ADMIN — POTASSIUM CHLORIDE 40 MEQ: 1500 TABLET, EXTENDED RELEASE ORAL at 08:41

## 2017-10-10 RX ADMIN — FUROSEMIDE 80 MG: 40 TABLET ORAL at 08:07

## 2017-10-10 RX ADMIN — ATORVASTATIN CALCIUM 40 MG: 40 TABLET, FILM COATED ORAL at 08:07

## 2017-10-10 RX ADMIN — WARFARIN SODIUM 15 MG: 7.5 TABLET ORAL at 19:40

## 2017-10-10 RX ADMIN — SODIUM CHLORIDE, PRESERVATIVE FREE 5 ML: 5 INJECTION INTRAVENOUS at 12:45

## 2017-10-10 RX ADMIN — LEVOTHYROXINE SODIUM 150 MCG: 100 TABLET ORAL at 06:32

## 2017-10-10 RX ADMIN — MULTIPLE VITAMINS W/ MINERALS TAB 1 TABLET: TAB at 08:11

## 2017-10-10 RX ADMIN — ASPIRIN 81 MG CHEWABLE TABLET 81 MG: 81 TABLET CHEWABLE at 08:06

## 2017-10-10 RX ADMIN — POTASSIUM CHLORIDE 40 MEQ: 1500 TABLET, EXTENDED RELEASE ORAL at 19:42

## 2017-10-10 RX ADMIN — METFORMIN HYDROCHLORIDE 500 MG: 500 TABLET ORAL at 19:40

## 2017-10-10 RX ADMIN — LISINOPRIL 10 MG: 10 TABLET ORAL at 08:07

## 2017-10-10 NOTE — PLAN OF CARE
Problem: Goal Outcome Summary  Goal: Goal Outcome Summary  Outcome: No Change  Pt has been voiding adequately without any difficulty, no hesitancy noted. Had a shower during OT session this morning. Dressing to the right heel and the left dorsal wounds were changed by the ostomy nurse. New wound care orders are in place. Lymphedema wraps were re-wrapped by the lymphedema therapist. Pt appears to be in good spirits and more hopeful today than yesterday. Assist of 1 to transfer from bed to chair, no respiratory distress noted. Oxygen saturation at 91-92% on RA. Excellent appetite noted. No bowel movement since Sunday, 10/8/17, currently denies constipation and able to pass flatus. Will continue to assess bowel status.

## 2017-10-10 NOTE — PLAN OF CARE
Problem: Goal Outcome Summary  Goal: Goal Outcome Summary  Outcome: Therapy, progress toward functional goals as expected  PTA: Limited session d/t lyphedema needing to see pt this afternoon. -20 min.     AM PT session, pt amb ~10 ft x1. Min/modA to roll onto L side, SBA supine>sit. Min/modA sit>stand from EOB using ww.

## 2017-10-10 NOTE — PLAN OF CARE
Problem: Goal Outcome Summary  Goal: Goal Outcome Summary  Outcome: Therapy, progress toward functional goals as expected  OT- Patient completed a full shower with a bench and with grab bars,Min/CGA On RA staying at 92% >. Placed a cushion in recliner chair for ease in transfers today.

## 2017-10-10 NOTE — PLAN OF CARE
Pt A&OX4, pleasant and cooperative, denies pain. AO1 w/ walker transfer from chair to bed. No SOB noted, reports SOB w/ activity. Pt allowed writer to change BLE wraps and dsgs. SKIN: Triple lumen PICC RUE, dsg changed per protocol, patent, CDI. Right heel w/ shallow wound open, cleansed and new dsg applied per orders. Left anterior foot slash/abrasion wound, cleansed, Bacitracin and new dsg applied. BLE ankle to knee erythema, skin flaky, cleansed and new dsg applied per orders. Posterior crusting left ankle/heel. New stockingette applied, lymphedema wraps over top. Pt states will allow WOCN to assess skin tomorrow. CPAP ready to apply per RT. Pt able to speak for needs, call light w/in reach./cont POC.

## 2017-10-10 NOTE — PROGRESS NOTES
10/10/17 1400   General Information   Patient Profile Review See Profile for full history and prior level of function   Daily Contact with Relatives or Friends Phone call;Visit   Community Involvement   Community Involvement Retired  ( bus company)   Spiritual Practice Not connected/interested   Sees [facility] ? No  (will notify staff if changes his mind)   Music   Music Preferences Classical;R&B   Brought Own Equipment No  (offered radio declined at this time)   Media   Newspapers Daily   Computer Will use patient computer   TV / Movies TV;Movie list   Sports / Physical Activities   Outdoor Activities (used to love being outdoors. Not at all in past year)   Sports Fan Baseball;Football;Basketball   Impression   Open to Socializing with Others Independent   Barriers to Leisure Mobility;Motivation   Patient, family and / or staff in agreement with Plan of Care Yes   Treatment Plan   Structured Groups Game groups;Social performances   Type of Intervention Independent with activity;1:1 intervention;Structured groups   One to One Therapeutic Intervention Hand massage;Volunteer   Assessment Recreational Services met with patient and educated patient on resources/activites provided on unit. Pt. reports main interest is to use patient computer while here on unit. Educated patient on leisure rooms and encouraged use during stay.  Pt. is interested in hand massage and will notify patient of group activites although unsure if will attend during stay.  Recreation will encourage groups for patient for increased socialization.

## 2017-10-10 NOTE — PHARMACY-TCU REVIEW OF H&P
I have reviewed this patient's TCU admission History & Physical for medication related changes/recommendations identified by the admitting provider.  I am confirming that there are no recommendations requiring changes to medication orders are indicated at this time based on the provider recommendations in the H&P.    Devorah Nova, PharmD, BCPS

## 2017-10-10 NOTE — PROGRESS NOTES
[facility] Nurse Inpatient Adult WoundAssessment    Initial Assessment  Reason for consultation: Evaluate and treat right plantar heel wound and left dorsal foot wound.    Assessment:   Right plantar heel wound due to Pressure Injury  Left dorsal foot wound due to trauma/skin trear    Pressure Injury is Stage 3 Present on admission Yes    Contributing factor of the pressure injury: pressure and immobility  Status: is healing, Stable    Photo: see below    TREATMENT  PLAN    Right plantar heel wound: wash every other day with wound cleanser and gauze. Apply Medihoney to wound base and cover with Mepilex 4x4.  Left dorsal foot wound: wash every other day with wound cleanser and gauze. Cover with Adaptic followed by Optifoam under lymphedema wraps.  Bilateral lower legs: wash with soap and water and wash cloth prior to lymphedema wrap placement. Moisturize freely with Sween 24.  Orders Written  [facility] Nurse follow-up plan:weekly  Nursing to notify the Provider(s) and re-consult the [facility] Nurse if wound(s) deteriorates or new skin concern.    Patient History  According to provider note(s):  Clarke Sotelo is a 67 year old male with a history notable for morbid obesity, atrial fibrillation, HFrEF, diabetes, PVD, and untreated depression that was admitted following a fall and CP/SOB on 9/23.  Transferred to MICU on 9/24 for AMS with acute hypoxic/hypercarbic respiratory failure and hypotension requiring initiation of BiPAP and pressors.  Much improved following nasal airway and BiPAP, off pressors and transferred to general medicine for further management.    Patient transferred to TCU on 10/8/2017 for further rehab.    Objective Data   Containment of urine/stool: Continent of bowel and Continent of bladder    Current Diet/ Nutrition:    Active Diet Order      Combination Diet 7306-7867 Calories: Moderate Consistent CHO (4-6 CHO units/meal)    Output:   I/O last 3 completed shifts:  In: -   Out: 4450 [Urine:4450]    Skin Assessment:  "Peterson Peterson Score  Avg: 15.3  Min: 13  Max: 19                                Peterson Q No Data Recorded                     NSRAS No Data Recorded     Labs:   Recent Labs  Lab 10/10/17  0701 10/09/17  0643   HGB  --  15.3   INR 1.97* 1.90*   WBC  --  4.9         No lab results found in last 7 days.    Physical Exam    Skin assessment:   Focused skin inspection: bilateral lower legs and feet. Legs with large amount of peeling epidermis, discoloration due to chronic edema/hemosiderin staining.    Wound Location:  Right plantar heel    10/10/17 right posterior heel    Wound History: Present on admission. Had been staged as \"unstageable\" due to slough. Now clean, granular base present  Measurements (length x width x depth, in cm) 3 cm x 2.8 cm  x  0.2 cm   Wound Base:  100% granulation tissue  Tunneling N/A  Undermining N/A  Palpation of the wound bed: normal   Periwound skin: intact  Color: normal and consistent with surrounding tissue  Temperature: normal   Drainage:, scant  Description of drainage: serosanguinous  Odor: none  Pain: denies     Wound Location:  Left plantar foot    10/10/17 left dorsal foot    Wound History: Present on admission. Patient states his sandal strap had been covering this area and was most likely injured when removing his shoe.  Measurements (length x width x depth, in cm) 2.5 cm x 1.6 cm  x  0.01 cm   Wound Base:  100% dermis  Palpation of the wound bed: normal   Periwound skin: intact  Color: purple due to chronic lymphedema  Temperature: normal   Drainage:, none  Description of drainage: none  Odor: none  Pain: denies     Interventions  Current support surface: Standard  Atmos Air    Current off-loading measures: Foam padding and Pillows under calves  Visual inspection of wound(s) completed  Wound Care:was done per plan of care    Cleansing with wound cleanser solution  Supplies: Reviewed, available at bedside  Education provided today: wound care, compression    Discussed plan of care " with Patient    Face to face time: 30 minutes

## 2017-10-10 NOTE — PLAN OF CARE
Problem: Goal Outcome Summary  Goal: Goal Outcome Summary  Outcome: No Change  Alert and oriented x4. Patient is very pleasant during cares. Denied any pain during assessments and appears to be sleeping comfortably during cares.  CPAP on starting at midnight, patient able to tolerate without problems. Utilizes the urinal at bedside and staff assists with emptying. Lymphedema wraps to BLE are clean, dry, and intact.  Offered to elevated the BLE's but patient noted he was comfortable without. Calls appropriately for assistance. Will continue with POC.

## 2017-10-11 LAB
GLUCOSE BLDC GLUCOMTR-MCNC: 111 MG/DL (ref 70–99)
GLUCOSE BLDC GLUCOMTR-MCNC: 99 MG/DL (ref 70–99)
INR PPP: 2.39 (ref 0.86–1.14)

## 2017-10-11 PROCEDURE — 40000193 ZZH STATISTIC PT WARD VISIT

## 2017-10-11 PROCEDURE — 36592 COLLECT BLOOD FROM PICC: CPT | Performed by: INTERNAL MEDICINE

## 2017-10-11 PROCEDURE — 40000133 ZZH STATISTIC OT WARD VISIT: Performed by: OCCUPATIONAL THERAPIST

## 2017-10-11 PROCEDURE — 85610 PROTHROMBIN TIME: CPT | Performed by: INTERNAL MEDICINE

## 2017-10-11 PROCEDURE — 00000146 ZZHCL STATISTIC GLUCOSE BY METER IP

## 2017-10-11 PROCEDURE — 97535 SELF CARE MNGMENT TRAINING: CPT | Mod: GO | Performed by: OCCUPATIONAL THERAPIST

## 2017-10-11 PROCEDURE — 25000128 H RX IP 250 OP 636: Performed by: INTERNAL MEDICINE

## 2017-10-11 PROCEDURE — 25000132 ZZH RX MED GY IP 250 OP 250 PS 637: Performed by: INTERNAL MEDICINE

## 2017-10-11 PROCEDURE — 97116 GAIT TRAINING THERAPY: CPT | Mod: GP

## 2017-10-11 PROCEDURE — 12000022 ZZH R&B SNF

## 2017-10-11 PROCEDURE — 97110 THERAPEUTIC EXERCISES: CPT | Mod: GP

## 2017-10-11 PROCEDURE — 97530 THERAPEUTIC ACTIVITIES: CPT | Mod: GP

## 2017-10-11 RX ADMIN — SODIUM CHLORIDE, PRESERVATIVE FREE 10 ML: 5 INJECTION INTRAVENOUS at 21:10

## 2017-10-11 RX ADMIN — FUROSEMIDE 80 MG: 40 TABLET ORAL at 08:25

## 2017-10-11 RX ADMIN — METFORMIN HYDROCHLORIDE 500 MG: 500 TABLET ORAL at 18:10

## 2017-10-11 RX ADMIN — WARFARIN SODIUM 12.5 MG: 7.5 TABLET ORAL at 18:10

## 2017-10-11 RX ADMIN — SODIUM CHLORIDE, PRESERVATIVE FREE 5 ML: 5 INJECTION INTRAVENOUS at 06:33

## 2017-10-11 RX ADMIN — POTASSIUM CHLORIDE 40 MEQ: 1500 TABLET, EXTENDED RELEASE ORAL at 08:24

## 2017-10-11 RX ADMIN — MULTIPLE VITAMINS W/ MINERALS TAB 1 TABLET: TAB at 08:24

## 2017-10-11 RX ADMIN — POTASSIUM CHLORIDE 40 MEQ: 1500 TABLET, EXTENDED RELEASE ORAL at 21:11

## 2017-10-11 RX ADMIN — ASPIRIN 81 MG CHEWABLE TABLET 81 MG: 81 TABLET CHEWABLE at 08:24

## 2017-10-11 RX ADMIN — LEVOTHYROXINE SODIUM 150 MCG: 100 TABLET ORAL at 05:32

## 2017-10-11 RX ADMIN — METOPROLOL SUCCINATE 200 MG: 100 TABLET, FILM COATED, EXTENDED RELEASE ORAL at 08:24

## 2017-10-11 RX ADMIN — METFORMIN HYDROCHLORIDE 500 MG: 500 TABLET ORAL at 08:24

## 2017-10-11 RX ADMIN — Medication 1 G: at 08:24

## 2017-10-11 RX ADMIN — LISINOPRIL 10 MG: 10 TABLET ORAL at 08:25

## 2017-10-11 RX ADMIN — ATORVASTATIN CALCIUM 40 MG: 40 TABLET, FILM COATED ORAL at 08:24

## 2017-10-11 RX ADMIN — TAMSULOSIN HYDROCHLORIDE 0.8 MG: 0.4 CAPSULE ORAL at 08:24

## 2017-10-11 RX ADMIN — SODIUM CHLORIDE, PRESERVATIVE FREE 5 ML: 5 INJECTION INTRAVENOUS at 21:10

## 2017-10-11 NOTE — PLAN OF CARE
Problem: Goal Outcome Summary  Goal: Goal Outcome Summary  Outcome: No Change  Patient sleeping between care, C-pap in use, he denies pain and shortness of breath. Urinal at bedside, patient continent of bladder, no BM this shift. Patient lower legs wraps intact, triple lumen PICC line flushed, sluggish white lumen and unable to flush gray lumen, call light is within reach, continue current plan of care

## 2017-10-11 NOTE — CONSULTS
Health Psychology                  Clinic    Department of Medicine  Stephanie Gray, Ph.D., L.P. [phone]                          [facility]  [facility] Tatum Hameed, Ph.D.,  L.P. [phone]                 3rd Floor  [place] Mail [address]   Erick Gomez, Ph.D., CALVIN.MAYITO., L.P. [phone]     [address] [place] [address] Sybil Guevara, Ph.D., L.P. [phone]            [place] [address]           Carolyn Madrid, Ph.D., L.P. [phone]     Inpatient Health Psychology Consultation*    Brief conversation with patient to introduce myself and Health Psychology services.     Mr Moreira was asif gracious, and appreciative of the opportunity to gain more emotional support. However, he is also feeling quite physically fatigued today, and asked if we could meet another time. Agreed that I will try to see him on Friday Oct 13.    Stephanie Gray, PhD, LP  [phone]    *In accordance with the Rules of the [place] Board of [employer], it is noted that psychological descriptions and scientific procedures underlying psychological evaluations have limitations.  Absolute predictions cannot be made based on information in this report.

## 2017-10-11 NOTE — PLAN OF CARE
Problem: Goal Outcome Summary  Goal: Goal Outcome Summary  PT: pt ambulated 25' x 1 with FWW, trace B step height with pt mostly sliding feet on floor, very slow pace, heavy B UE reliance on FWW, pt reporting B UE shoulder and back fatigue more so than LE fatigue, but then pt took two more steps and needed to sit down due to quick increase in B LE fatigue, CGA throughout.

## 2017-10-11 NOTE — PLAN OF CARE
Problem: Goal Outcome Summary  Goal: Goal Outcome Summary  Outcome: No Change  Pt continues to transfer with assist of 1, no SOB noted during transfers. Oxygen saturation remains at 92-93% on RA. SBP decreased to the 80's upon standing from a sitting position during therapy session. BP at 92/56, HR 92 when re-assessed upon completion of the therapy session. Pt had a large formed bowel movement after breakfast. Lymphedema wraps were re-wrapped on bilateral L/E, dressing was replaced on the left dorsal wound when it fell off. On Mg and potassium protocol, no labs collected except for INR at 2.39. No complaints of discomfort. Pleasant and cooperative with cares.

## 2017-10-11 NOTE — PLAN OF CARE
Problem: Goal Outcome Summary  Goal: Goal Outcome Summary  Outcome: Therapy, progress toward functional goals as expected  OT- Patient on commode at start of session - agreeable to trial all tasks of basic cares/dressing today withour AE - has new off loading shoes B LE's also.

## 2017-10-12 LAB
ANION GAP SERPL CALCULATED.3IONS-SCNC: 5 MMOL/L (ref 3–14)
BUN SERPL-MCNC: 7 MG/DL (ref 7–30)
CALCIUM SERPL-MCNC: 8.5 MG/DL (ref 8.5–10.1)
CHLORIDE SERPL-SCNC: 103 MMOL/L (ref 94–109)
CO2 SERPL-SCNC: 32 MMOL/L (ref 20–32)
CREAT SERPL-MCNC: 0.65 MG/DL (ref 0.66–1.25)
ERYTHROCYTE [DISTWIDTH] IN BLOOD BY AUTOMATED COUNT: 13.8 % (ref 10–15)
GFR SERPL CREATININE-BSD FRML MDRD: >90 ML/MIN/1.7M2
GLUCOSE BLDC GLUCOMTR-MCNC: 100 MG/DL (ref 70–99)
GLUCOSE BLDC GLUCOMTR-MCNC: 93 MG/DL (ref 70–99)
GLUCOSE SERPL-MCNC: 99 MG/DL (ref 70–99)
HCT VFR BLD AUTO: 49 % (ref 40–53)
HGB BLD-MCNC: 15.7 G/DL (ref 13.3–17.7)
INR PPP: 2.69 (ref 0.86–1.14)
MAGNESIUM SERPL-MCNC: 1.9 MG/DL (ref 1.6–2.3)
MCH RBC QN AUTO: 30.1 PG (ref 26.5–33)
MCHC RBC AUTO-ENTMCNC: 32 G/DL (ref 31.5–36.5)
MCV RBC AUTO: 94 FL (ref 78–100)
PLATELET # BLD AUTO: 228 10E9/L (ref 150–450)
POTASSIUM SERPL-SCNC: 3.9 MMOL/L (ref 3.4–5.3)
RBC # BLD AUTO: 5.21 10E12/L (ref 4.4–5.9)
SODIUM SERPL-SCNC: 140 MMOL/L (ref 133–144)
WBC # BLD AUTO: 5.2 10E9/L (ref 4–11)

## 2017-10-12 PROCEDURE — 25000132 ZZH RX MED GY IP 250 OP 250 PS 637: Performed by: INTERNAL MEDICINE

## 2017-10-12 PROCEDURE — 97535 SELF CARE MNGMENT TRAINING: CPT | Mod: GO | Performed by: OCCUPATIONAL THERAPIST

## 2017-10-12 PROCEDURE — 40000193 ZZH STATISTIC PT WARD VISIT

## 2017-10-12 PROCEDURE — 97110 THERAPEUTIC EXERCISES: CPT | Mod: GP

## 2017-10-12 PROCEDURE — 80048 BASIC METABOLIC PNL TOTAL CA: CPT | Performed by: INTERNAL MEDICINE

## 2017-10-12 PROCEDURE — 36592 COLLECT BLOOD FROM PICC: CPT | Performed by: INTERNAL MEDICINE

## 2017-10-12 PROCEDURE — 85027 COMPLETE CBC AUTOMATED: CPT | Performed by: INTERNAL MEDICINE

## 2017-10-12 PROCEDURE — 85610 PROTHROMBIN TIME: CPT | Performed by: INTERNAL MEDICINE

## 2017-10-12 PROCEDURE — 12000022 ZZH R&B SNF

## 2017-10-12 PROCEDURE — 00000146 ZZHCL STATISTIC GLUCOSE BY METER IP

## 2017-10-12 PROCEDURE — 40000133 ZZH STATISTIC OT WARD VISIT: Performed by: OCCUPATIONAL THERAPIST

## 2017-10-12 PROCEDURE — 25000128 H RX IP 250 OP 636: Performed by: INTERNAL MEDICINE

## 2017-10-12 PROCEDURE — 97530 THERAPEUTIC ACTIVITIES: CPT | Mod: GP

## 2017-10-12 PROCEDURE — 83735 ASSAY OF MAGNESIUM: CPT | Performed by: INTERNAL MEDICINE

## 2017-10-12 PROCEDURE — 97116 GAIT TRAINING THERAPY: CPT | Mod: GP

## 2017-10-12 RX ORDER — WARFARIN SODIUM 5 MG/1
10 TABLET ORAL
Status: COMPLETED | OUTPATIENT
Start: 2017-10-12 | End: 2017-10-12

## 2017-10-12 RX ADMIN — FUROSEMIDE 80 MG: 40 TABLET ORAL at 08:53

## 2017-10-12 RX ADMIN — SODIUM CHLORIDE, PRESERVATIVE FREE 5 ML: 5 INJECTION INTRAVENOUS at 17:42

## 2017-10-12 RX ADMIN — LEVOTHYROXINE SODIUM 150 MCG: 100 TABLET ORAL at 06:32

## 2017-10-12 RX ADMIN — WARFARIN SODIUM 10 MG: 5 TABLET ORAL at 17:42

## 2017-10-12 RX ADMIN — Medication 1 G: at 08:52

## 2017-10-12 RX ADMIN — LISINOPRIL 10 MG: 10 TABLET ORAL at 08:53

## 2017-10-12 RX ADMIN — MULTIPLE VITAMINS W/ MINERALS TAB 1 TABLET: TAB at 08:51

## 2017-10-12 RX ADMIN — SODIUM CHLORIDE, PRESERVATIVE FREE 5 ML: 5 INJECTION INTRAVENOUS at 07:13

## 2017-10-12 RX ADMIN — METOPROLOL SUCCINATE 200 MG: 100 TABLET, FILM COATED, EXTENDED RELEASE ORAL at 08:52

## 2017-10-12 RX ADMIN — METFORMIN HYDROCHLORIDE 500 MG: 500 TABLET ORAL at 08:52

## 2017-10-12 RX ADMIN — METFORMIN HYDROCHLORIDE 500 MG: 500 TABLET ORAL at 17:42

## 2017-10-12 RX ADMIN — ATORVASTATIN CALCIUM 40 MG: 40 TABLET, FILM COATED ORAL at 08:52

## 2017-10-12 RX ADMIN — TAMSULOSIN HYDROCHLORIDE 0.8 MG: 0.4 CAPSULE ORAL at 08:52

## 2017-10-12 RX ADMIN — POTASSIUM CHLORIDE 40 MEQ: 1500 TABLET, EXTENDED RELEASE ORAL at 21:27

## 2017-10-12 RX ADMIN — POTASSIUM CHLORIDE 40 MEQ: 1500 TABLET, EXTENDED RELEASE ORAL at 08:51

## 2017-10-12 RX ADMIN — ASPIRIN 81 MG CHEWABLE TABLET 81 MG: 81 TABLET CHEWABLE at 08:51

## 2017-10-12 NOTE — PLAN OF CARE
Pt A&OX4, very pleasant, denies pain. Sat up in chair for dinner, AO1 w/ walker to bed. Pt resting in bed watching TV at this time, states he normally stays up after midnight. No SOB noted. BLE wraps CDI. Elevated on pillows. PICC RUE CDI, flushed w/o problem. Able to speak for needs. Call light w/in reach. /cont POC.

## 2017-10-12 NOTE — PLAN OF CARE
Problem: Goal Outcome Summary  Goal: Goal Outcome Summary  Outcome: Therapy, progress toward functional goals as expected  PTA: Pt pleased with progress. Sit<>stands from w/c in parallel bars x2 trials CGA/Vasyl during 2nd am PT session. Amb 27 ft x1 using ww CGA, close w/c follow.

## 2017-10-12 NOTE — PLAN OF CARE
Problem: Goal Outcome Summary  Goal: Goal Outcome Summary  PT: Focus on functional mobility, gait training, STS transfers. Pt performed 3x STS transfers from WC and recliner w/ FWW and w/i // bars. VC for proper technique and anterior wt shift for improved mechanical advantage. Pt demo improved STS this day, continues to require increased time w/ heavy reliance on UE to successfully obtain BLE hip/knee extension. Pt engaged in gait training ~25', cont to demo shuffling pattern w/ minimal LE clearance, CGA for safety, no overt LOB, requires standing rest breaks. Pt performed standing LE exercises in // bars w/ rest b/t exercise.

## 2017-10-12 NOTE — PLAN OF CARE
Problem: Goal Outcome Summary  Goal: Goal Outcome Summary  Outcome: No Change  Pt.appears to be sleeping well. Full face cpap in use through the NOC, occ.leak alarms, need to awaken pt.and then he can adjust the mask as it tends to move out of place while sleeping. Pt.denies pain or discomfort. No c/o's CP/SOB. Continent using urinal indep.- staff empties. BLE lymphedema wraps intact. Has TL open-ended power picc RUE.    8868 - Writer called RT to check if they are able to get adult full face mask for cpap. Currently pt.has pediatric lrg.and could be the cause of the frequent leak alarms as the bottom portion tends to ride up not fully covering his mouth. RT stated will follow up - day RN updated.

## 2017-10-12 NOTE — PLAN OF CARE
Problem: Goal Outcome Summary  Goal: Goal Outcome Summary  Outcome: No Change  Pt participated in therapy session, complained of being tired from the session, rest promoted. No respiratory distress noted. Voiding without difficulty, pt voids frequently due to diuretic. Assist of 1 to transfer. RT was called to find out about the plan to replace pt's BI-PAP mask. Per RT, new x-large mask was ordered from the [facility] and will replace the mask tonight. On Magnesium and Potassium protocol. Mg level of 1.9 and Potassium level of 3.9, no supplements are required at this time. Lymphedema wraps are intact on bilateral L/E.

## 2017-10-13 LAB
GLUCOSE BLDC GLUCOMTR-MCNC: 100 MG/DL (ref 70–99)
GLUCOSE BLDC GLUCOMTR-MCNC: 96 MG/DL (ref 70–99)
INR PPP: 2.79 (ref 0.86–1.14)

## 2017-10-13 PROCEDURE — 40000133 ZZH STATISTIC OT WARD VISIT: Performed by: OCCUPATIONAL THERAPIST

## 2017-10-13 PROCEDURE — 36592 COLLECT BLOOD FROM PICC: CPT | Performed by: INTERNAL MEDICINE

## 2017-10-13 PROCEDURE — 40000275 ZZH STATISTIC RCP TIME EA 10 MIN

## 2017-10-13 PROCEDURE — 94660 CPAP INITIATION&MGMT: CPT

## 2017-10-13 PROCEDURE — 97530 THERAPEUTIC ACTIVITIES: CPT | Mod: GO | Performed by: OCCUPATIONAL THERAPIST

## 2017-10-13 PROCEDURE — 97116 GAIT TRAINING THERAPY: CPT | Mod: GP

## 2017-10-13 PROCEDURE — 85610 PROTHROMBIN TIME: CPT | Performed by: INTERNAL MEDICINE

## 2017-10-13 PROCEDURE — 00000146 ZZHCL STATISTIC GLUCOSE BY METER IP

## 2017-10-13 PROCEDURE — 40000193 ZZH STATISTIC PT WARD VISIT

## 2017-10-13 PROCEDURE — 99309 SBSQ NF CARE MODERATE MDM 30: CPT | Performed by: INTERNAL MEDICINE

## 2017-10-13 PROCEDURE — 97530 THERAPEUTIC ACTIVITIES: CPT | Mod: GP

## 2017-10-13 PROCEDURE — 97535 SELF CARE MNGMENT TRAINING: CPT | Mod: GO | Performed by: OCCUPATIONAL THERAPIST

## 2017-10-13 PROCEDURE — 25000132 ZZH RX MED GY IP 250 OP 250 PS 637: Performed by: INTERNAL MEDICINE

## 2017-10-13 PROCEDURE — 97110 THERAPEUTIC EXERCISES: CPT | Mod: GP

## 2017-10-13 PROCEDURE — 12000022 ZZH R&B SNF

## 2017-10-13 PROCEDURE — 25000128 H RX IP 250 OP 636: Performed by: INTERNAL MEDICINE

## 2017-10-13 RX ORDER — WARFARIN SODIUM 5 MG/1
10 TABLET ORAL
Status: COMPLETED | OUTPATIENT
Start: 2017-10-13 | End: 2017-10-13

## 2017-10-13 RX ADMIN — LEVOTHYROXINE SODIUM 150 MCG: 100 TABLET ORAL at 06:36

## 2017-10-13 RX ADMIN — METFORMIN HYDROCHLORIDE 500 MG: 500 TABLET ORAL at 17:50

## 2017-10-13 RX ADMIN — ATORVASTATIN CALCIUM 40 MG: 40 TABLET, FILM COATED ORAL at 08:13

## 2017-10-13 RX ADMIN — METOPROLOL SUCCINATE 200 MG: 100 TABLET, FILM COATED, EXTENDED RELEASE ORAL at 08:14

## 2017-10-13 RX ADMIN — FUROSEMIDE 80 MG: 40 TABLET ORAL at 08:14

## 2017-10-13 RX ADMIN — Medication 1 G: at 08:13

## 2017-10-13 RX ADMIN — METFORMIN HYDROCHLORIDE 500 MG: 500 TABLET ORAL at 08:14

## 2017-10-13 RX ADMIN — LISINOPRIL 10 MG: 10 TABLET ORAL at 08:14

## 2017-10-13 RX ADMIN — POTASSIUM CHLORIDE 40 MEQ: 1500 TABLET, EXTENDED RELEASE ORAL at 08:13

## 2017-10-13 RX ADMIN — POTASSIUM CHLORIDE 40 MEQ: 1500 TABLET, EXTENDED RELEASE ORAL at 22:04

## 2017-10-13 RX ADMIN — ASPIRIN 81 MG CHEWABLE TABLET 81 MG: 81 TABLET CHEWABLE at 08:13

## 2017-10-13 RX ADMIN — TAMSULOSIN HYDROCHLORIDE 0.8 MG: 0.4 CAPSULE ORAL at 08:13

## 2017-10-13 RX ADMIN — SODIUM CHLORIDE, PRESERVATIVE FREE 5 ML: 5 INJECTION INTRAVENOUS at 14:37

## 2017-10-13 RX ADMIN — SODIUM CHLORIDE, PRESERVATIVE FREE 5 ML: 5 INJECTION INTRAVENOUS at 04:57

## 2017-10-13 RX ADMIN — MULTIPLE VITAMINS W/ MINERALS TAB 1 TABLET: TAB at 08:13

## 2017-10-13 RX ADMIN — WARFARIN SODIUM 10 MG: 5 TABLET ORAL at 17:50

## 2017-10-13 NOTE — PLAN OF CARE
Problem: Goal Outcome Summary  Goal: Goal Outcome Summary  OT:  Pt. Completed self cares with assist for set up and Indep. Pt. Stood with SBA during task x 7 minutes. Pt. Able to propel wheelchair ~ 75'  with UE and LE to assist with mobility. Pt. Encouraged to be up in chair and propel self to leisure rooms to continue to work on activity tolerance/strengthening.

## 2017-10-13 NOTE — PLAN OF CARE
Problem: Goal Outcome Summary  Goal: Goal Outcome Summary  OT: patient making nice progress daily and has been open and receptive to OT suggestions as well as helpful AE.

## 2017-10-13 NOTE — PROGRESS NOTES
"SPIRITUAL HEALTH SERVICES  SPIRITUAL ASSESSMENT Progress Note  [facility] ([facility] TCU     PRIMARY FOCUS:     Goals of care    Symptom/pain management    Emotional/spiritual/Muslim distress    Support for coping    REFERRAL SOURCE: LOS    ILLNESS CIRCUMSTANCES:   Reviewed documentation. Reflective conversation shared with Clarke which integrated elements of illness and family narratives.     Context of Serious Illness/Symptom(s) - Clarke fell at home and sent to the ER and his CO2 levels were dangerously high.  Previous SHS notes indicate his initial SHS visit was a Palliative Consult.  His symptoms have improved and he is now in TCU and may be able to go home soon. Clarke was also diagnosed with diabetes so he is learning  to manage his care with that disease.    Resources for Support - Clarke has a friend Dre who is a retired  who has visited several times.      DISTRESS:     Emotional/Spiritual/Existential Distress - Clarke has suffered from depression for \"years\", which he says comes from his mother who suffered from mental illness' for much of her life.  Clarke is concerned about going back to his apartment and not being able to handle his depression and new condition of diabetes.      Oriental orthodox Distress - Not Discussed    Social/Cultural/Economic Distress - Clarke stated he isolated himself in his apartment and life to keep people from hurting him, and often times he will abandon friends before they can abandon him.  He shared some concern with this financial situation, especially with the medical bills that will come from this stay.  Clarke shared that he was an avid reader, but has not read much in years because his reading glasses are over 15 years old.  He also stated that he worries about being able to function in his apartment due to lack of furniture.    SPIRITUAL/Jew COPING:     Latter-day/Sonia - Not Discussed    Spiritual Practice(s) - Not " "Discussed    Emotional/Relational/Existential Connections - Clarke's friend Dre has a \"playful\" personality & Clarke enjoys this friendship as Dre treats him and makes him feel worthy of friendship and humanity.    GOALS OF CARE:    Goals of Care - Clarke's goal of care is to become stronger & better manage the physical aspects of self care.    Meaning/Sense-Making - Making sense of his own emotions and learn of ways to process and allow his emotions to \"cycle\" through to their end and not get stuck in them.     PLAN: Will check back with Clarke on 10/15 in TCU.  Send a note to TCU SW re. eye glasses & housing situation.    Jt Almazan   Intern  Pager [phone]    "

## 2017-10-13 NOTE — PROGRESS NOTES
Social Work: Initial Assessment with Discharge Plan    Patient Name: Clarke Moreira  : 1950  Age: 67 year old  MRN: [id number]  Completed assessment with: Pt  Admitted to TCU: 10/8/2017    Presenting Information   Date of SW assessment: 2017  Health Care Directive: Patient considering completing  Primary Health Care Agent: default to next of kin.   Secondary Health Care Agent: JANAE  Living Situation: Pt lives alone in an apartment in [place].   Previous Functional Status: Pt was semi-independent prior to hospitalization.   DME available: See therapy notes.   Patient and family understanding of hospitalization: Pt stated that he is here for therapy.   Cultural/Language/Spiritual Considerations: English speaking.   Abuse concerns: No concerns.   BIMS: Pt scored 15 on BIMS indicating cognitively intact  PHQ-9: Pt scored 00 on PHQ-9 indicating no depressive symptoms  PAS: confirmation number- [id number]  Has there been a level II screen?  No  Were there any recommendations in the screen? N/A  If yes, will the recommendations we incorporated into the Plan of Care?  N/A  Physical Health  Reason for admission: No diagnosis found.    Provider Information   Primary Care Physician:Matthias Sanchez   : JANAE    Mental Health:   Diagnosis: Depressive disorder, depression with anxiety.  Current Support/Services: None  Previous Services: Therapy and medications.   Services Needed/Recommended: None recommended at this time.     Substance Use:  Diagnosis: Pt indicated that he is an alcoholic, but that he has been sober for the last 15 years.   Current Support/Services: None  Previous Services: NA  Services Needed/Recommended: None    Support System  Marital Status: Single  Family support: Pt has the support of his apartment.  Other support available: Two friends are a good support for the pt.   Gaps in support system: No apparent gaps.     Community Resources  Current in home services: NA  Previous  services: NA    Financial/Employment/Education  Employment Status: Disabled.   Income Source: SSI  Education: College  Financial Concerns:  No concerns.   Insurance: [facility] for seniors.       Discharge Plan   Patient and family discharge goal: Pt's goal is to return home.   Provided Education on discharge plan: YES  Patient agreeable to discharge plan:  YES    Barriers to discharge: Medical clearance, safe DC plan.     Discharge Recommendations   Disposition: Home   Transportation Needs: Friend will provide/ private pay   Name of Transportation Company and Phone: TBD     10/18/17 1600   Living Arrangements   Lives With alone   Living Arrangements apartment   Discharge Planning   Expected Discharge Date 10/23/17   Discharge Needs Assessment   Patient/family verbalizes understanding of discharge plan recommendations? Yes       YAAKOV Carlin  [facility]   P: [phone]  Pgr: [phone]

## 2017-10-13 NOTE — PLAN OF CARE
Problem: Goal Outcome Summary  Goal: Goal Outcome Summary  Outcome: Therapy, progress toward functional goals as expected  PTA: Pt pleased with progress made thus far. Amb 43 ft during am PT session CGA, w/c follow. B post-op shoes on.     Pt amb ~25 ft x1 using ww CGA this afternoon. Slow pace. Pt reported increased pain medial ankle of L foot towards end of distance. Improved once sitting. Will need to keep an eye on this

## 2017-10-13 NOTE — PLAN OF CARE
Problem: Goal Outcome Summary  Goal: Goal Outcome Summary  Outcome: No Change  Alert and oriented x4. Denies any pain tonight. Reports no new concerns thus far this shift. Appears to be sleeping comfortably in between cares. CPAP on, humidification readjusted by RT per patient request. Utilizes the urinal at bedside. Lymphedema wraps to the BLE's are c/d/i. Calls for needs. Will continue to monitor patient.

## 2017-10-13 NOTE — PROGRESS NOTES
"CLINICAL NUTRITION SERVICES - ASSESSMENT NOTE     Nutrition Prescription    RECOMMENDATIONS FOR MDs/PROVIDERS TO ORDER:  None at this time    Malnutrition Status:    Severe malnutrition in the context of acute illness    Recommendations already ordered by Registered Dietitian (RD):  Gelatein between meals  Weekly weight checks    Future/Additional Recommendations:  - Monitor PO intake and wt trends.   - Consider allowing pt to order double protein at meals if appetite increases     REASON FOR ASSESSMENT  Clarke Moreira is a/an 67 year old male assessed by the dietitian for LOS    NUTRITION HISTORY  Pt reports having a poor appetite since he was admitted to the hospital on 9/23 and was not eating during the first few days during his hospital stay. He continued to eat minimal amounts d/t reduced appetite and attempt to loose wt, until he met with the dietitian who told him to eat more energy-protein.     CURRENT NUTRITION ORDERS  Diet: Moderate Consistent Carbohydrate and 1500 mL Fluid Restriction  Intake/Tolerance: Per nurse sheets, pt is eating 100% of recorded meals.  Pt reports only eating 2 meals/day. He states he has little appetite and eating 3 meals would be difficult for him. He is eating omelets, chicken wraps, and fruit. He reports having good energy for his therapies. Health [facility] indicates that he is getting and avg of 815 kcals/day and 51g/day PRO over 4 days. This is meeting 38% energy and 40% protein needs.     LABS  BUN was 6 on 10/1 and 10/6. BUN trending low with intermittent labs under normal range, likely d/t inadequate protein intake.     MEDICATIONS  Medications reviewed    ANTHROPOMETRICS  Height: 182.9 cm (6' 0\")  Most Recent Weight: (!) 179.7 kg (396 lb 3.2 oz) (10/12)   IBW: 81 kg  BMI: 53.73 kg/m2 Obesity Grade III BMI >40  Weight History: Per MD note, pt admitted to hospital 9/23 with wt of ~460 lbs, but likely d/t fluid status, dry wt ~415-420 lbs. 6% wt loss since  in the past 2 " weeks.  Wt Readings from Last 10 Encounters:   10/12/17 (!) 179.7 kg (396 lb 3.2 oz)   10/07/17 (!) 184 kg (405 lb 10.3 oz)   08/14/17 (!) 191 kg (421 lb)   09/28/16 (!) 187.3 kg (413 lb)   09/14/16 (!) 187.4 kg (413 lb 3.2 oz)   08/01/14 (!) 189.4 kg (417 lb 9.6 oz)   06/07/13 (!) 192 kg (423 lb 4.8 oz)   03/13/13 (!) 191.7 kg (422 lb 9.6 oz)     Dosing Weight: 106 kg (adjusted for obesity using current wt of 179.7 kg)    ASSESSED NUTRITION NEEDS  Estimated Energy Needs: 2120- 2650 kcals/day (20 - 25 kcals/kg)  Justification: Maintenance  Estimated Protein Needs: 127-160 grams protein/day (1.2 - 1.5 grams of pro/kg)  Justification: Maintenance and Repletion  Estimated Fluid Needs: 6670-3012 mL/day (1 mL/kcal)   Justification: Maintenance    PHYSICAL FINDINGS  See malnutrition section below.      MALNUTRITION  % Intake: </= 50% for >/= 5 days (severe)  % Weight Loss: > 5% in 1 month (severe)  Subcutaneous Fat Loss: None observed  Muscle Loss: None observed  Fluid Accumulation/Edema: Mild (+2 edema bilaterally)  Malnutrition Diagnosis: Severe malnutrition in the context of acute illness    NUTRITION DIAGNOSIS  Inadequate protein intake related to decreased appetite and possible attempt to loose wt as evidenced by low trending BUN and calculated avg protein intake of 51g/day, per Health Touch.       INTERVENTIONS  Implementation  Nutrition Education: Provided education on specific protein and energy needs. Encouraged pt to increase his protein intake. Discussed the harm of loosing wt too rapidly.     Medical food supplement therapy - Gelatein TID between meals    Goals  Patient to consume 100% of nutritionally adequate meal trays TID, or the equivalent with supplements/snacks + 2 protein supplements between meals.     Monitoring/Evaluation  Progress toward goals will be monitored and evaluated per protocol.    Shasta Sterling, Dietetic Intern  [facility]      RD has read and agrees with the above  assessment and interventions.      Kenyetta Hernandez RD, LD  Unit Pager: [phone]

## 2017-10-13 NOTE — PLAN OF CARE
Problem: Goal Outcome Summary  Goal: Goal Outcome Summary  RN: Pt A/O x4, see VS in the system. Appetite good. Continue to have bilateral LE edema, lymphedema wraps removed for skin assessment and skin care, wound dressing change, and wrapped after. Pt denies  SOB or pain and has no compliant. Very pleasant. Continue with POC.

## 2017-10-13 NOTE — PLAN OF CARE
Problem: Goal Outcome Summary  Goal: Goal Outcome Summary  Outcome: No Change  Pt is very pleasant and cooperative with cares. Assist of 1 to transfer from bed to chair and vice versa, verbalized increase in endurance level and strength during therapy sessions. Pt expressed that he was unable to sleep comfortably at night due to the noise/ heat  from the bi-pap and the mask. RT was paged to provide a smaller size mask and adjust the settings on the bi-pap. No bowel movement this morning. Triple lumen PICC on the right ac, all ports were flushed and heparin locked. On Mg and Potassium protocol, no labs were collected except for INR.

## 2017-10-14 LAB
GLUCOSE BLDC GLUCOMTR-MCNC: 100 MG/DL (ref 70–99)
GLUCOSE BLDC GLUCOMTR-MCNC: 94 MG/DL (ref 70–99)
INR PPP: 2.63 (ref 0.86–1.14)

## 2017-10-14 PROCEDURE — 40000193 ZZH STATISTIC PT WARD VISIT

## 2017-10-14 PROCEDURE — 36592 COLLECT BLOOD FROM PICC: CPT | Performed by: INTERNAL MEDICINE

## 2017-10-14 PROCEDURE — 12000022 ZZH R&B SNF

## 2017-10-14 PROCEDURE — 25000132 ZZH RX MED GY IP 250 OP 250 PS 637: Performed by: INTERNAL MEDICINE

## 2017-10-14 PROCEDURE — 97110 THERAPEUTIC EXERCISES: CPT | Mod: GP | Performed by: PHYSICAL THERAPIST

## 2017-10-14 PROCEDURE — 97110 THERAPEUTIC EXERCISES: CPT | Mod: GP

## 2017-10-14 PROCEDURE — 85610 PROTHROMBIN TIME: CPT | Performed by: INTERNAL MEDICINE

## 2017-10-14 PROCEDURE — 97530 THERAPEUTIC ACTIVITIES: CPT | Mod: GP | Performed by: PHYSICAL THERAPIST

## 2017-10-14 PROCEDURE — 25000128 H RX IP 250 OP 636: Performed by: INTERNAL MEDICINE

## 2017-10-14 PROCEDURE — 97530 THERAPEUTIC ACTIVITIES: CPT | Mod: GP

## 2017-10-14 PROCEDURE — 40000193 ZZH STATISTIC PT WARD VISIT: Performed by: PHYSICAL THERAPIST

## 2017-10-14 PROCEDURE — 00000146 ZZHCL STATISTIC GLUCOSE BY METER IP

## 2017-10-14 RX ORDER — WARFARIN SODIUM 5 MG/1
10 TABLET ORAL
Status: COMPLETED | OUTPATIENT
Start: 2017-10-14 | End: 2017-10-14

## 2017-10-14 RX ADMIN — POTASSIUM CHLORIDE 40 MEQ: 1500 TABLET, EXTENDED RELEASE ORAL at 21:05

## 2017-10-14 RX ADMIN — SODIUM CHLORIDE, PRESERVATIVE FREE 5 ML: 5 INJECTION INTRAVENOUS at 06:52

## 2017-10-14 RX ADMIN — LEVOTHYROXINE SODIUM 150 MCG: 100 TABLET ORAL at 07:02

## 2017-10-14 RX ADMIN — METFORMIN HYDROCHLORIDE 500 MG: 500 TABLET ORAL at 17:43

## 2017-10-14 RX ADMIN — ASPIRIN 81 MG CHEWABLE TABLET 81 MG: 81 TABLET CHEWABLE at 08:00

## 2017-10-14 RX ADMIN — WARFARIN SODIUM 10 MG: 5 TABLET ORAL at 17:43

## 2017-10-14 RX ADMIN — ATORVASTATIN CALCIUM 40 MG: 40 TABLET, FILM COATED ORAL at 08:00

## 2017-10-14 RX ADMIN — SENNOSIDES 2 TABLET: 8.6 TABLET, FILM COATED ORAL at 08:04

## 2017-10-14 RX ADMIN — Medication 1 G: at 08:00

## 2017-10-14 RX ADMIN — LISINOPRIL 10 MG: 10 TABLET ORAL at 08:01

## 2017-10-14 RX ADMIN — TAMSULOSIN HYDROCHLORIDE 0.8 MG: 0.4 CAPSULE ORAL at 08:02

## 2017-10-14 RX ADMIN — FUROSEMIDE 80 MG: 40 TABLET ORAL at 08:01

## 2017-10-14 RX ADMIN — POTASSIUM CHLORIDE 40 MEQ: 1500 TABLET, EXTENDED RELEASE ORAL at 08:00

## 2017-10-14 RX ADMIN — SODIUM CHLORIDE, PRESERVATIVE FREE 10 ML: 5 INJECTION INTRAVENOUS at 17:43

## 2017-10-14 RX ADMIN — METOPROLOL SUCCINATE 200 MG: 100 TABLET, FILM COATED, EXTENDED RELEASE ORAL at 08:00

## 2017-10-14 RX ADMIN — MULTIPLE VITAMINS W/ MINERALS TAB 1 TABLET: TAB at 08:00

## 2017-10-14 RX ADMIN — METFORMIN HYDROCHLORIDE 500 MG: 500 TABLET ORAL at 08:00

## 2017-10-14 NOTE — PLAN OF CARE
Problem: Goal Outcome Summary  Goal: Goal Outcome Summary  Outcome: No Change  No bowel movement for the last 3 days, Senokot was administered. Pt had a large result afterwards. Assist of 1 to transfer without complaints of SOB. Lymphedema wraps are intact on bilateral L/E. Eduction provided on medications, pt has been receptive to learning activities. Pleasant and cooperative with cares.

## 2017-10-14 NOTE — PLAN OF CARE
Problem: Goal Outcome Summary  Goal: Goal Outcome Summary  Outcome: No Change  Alert and oriented x4. Patient reports no new complaints this shift. Asleep in between encounters. No complaints of pain. Tolerating the CPAP without problems, humidification turned off per patient request. Urinal within reach, staff assists with emptying. Dressings/wraps to BLE's are c/d/i. Calls for needs. Will continue to monitor patient.

## 2017-10-14 NOTE — PLAN OF CARE
Problem: Goal Outcome Summary  Goal: Goal Outcome Summary  OT: Attempted to see pt for session. Pt c/o numbness in LE after sitting on commode too long and being spent from the transfer back to the chair. Pt declined activity. Will check back on pt as able. -60

## 2017-10-14 NOTE — PLAN OF CARE
Problem: Goal Outcome Summary  Goal: Goal Outcome Summary  PT: Deferred to LE seated exercises due to pt's L LE numbness and inability to move as usual. He relates this to sitting on the commode for 30 minutes with pressure on the back of upper thigh from edge of commode as he feet dangled.

## 2017-10-14 NOTE — CONSULTS
Health Psychology                  Clinic    Department of Medicine  Stephanie Gray, Ph.D., L.P. [phone]                          Clin[facility] Center  [facility] Tatum Hameed, Ph.D.,  L.P. [phone]                 3rd Floor  [place] Mail Code 747   Erick Gomez, Ph.D., DARBY., L.P. [phone]     [address] [place] [address] Sybil Guevara, Ph.D., L.P. [phone]            [id number]  [place] [address]           Carolyn Madrid, Ph.D., L.P. [phone]     Inpatient Health Psychology Consultation*    DATE OF SERVICE:  10/13/2017      INPATIENT HEALTH PSYCHOLOGY CONSULTATION       REQUESTING PHYSICIAN:  Mathew Olson MD, Transitional Care Unit, [facility]      REASON FOR REFERRAL:  Clarke Moreira is a 67-year-old man currently on the [facility] Transitional Care Unit.  He was initially admitted on 09/23/2017 following a fall at home that led to shortness of breath and realization that he had a significant sore on one of his heels that he had been unaware of.  Mr. Moreira was on the medical ICU for altered mental status with hypoxic and hypercarbic respiratory failure that initially required use of BiPAP and pressors.  He has a history of significant depression and anxiety that led to multiple conversations about his wishes for level of medical intervention.  His current code status is DNR/DNI.  This evaluation was requested to assess his current emotional status, make treatment recommendations and provide support per Mr. Moreira's request to allow him to begin to articulate some of the stressors that have led to the current situation.      HISTORY OF PRESENT ILLNESS:  Mr. Moreira reports that he has been depressed and anxious most of his life.  He attributes this to a very difficult upbringing, being the only child of a single mother whom he believes had  significant mental illness of her own.  He has had multiple courses of psychotherapy over the years that he has found helpful.  He has chronic passive suicidal ideation but has reportedly not had active suicidal ideation nor has he made an active gesture of self-harm.  However, Mr. Moreira points out that he has made many decisions over his lifetime because of his internalized feelings of low self-worth that have led to his very poor state of health.  He is morbidly obese with multiple complications related to that.  Following several events in 2008 including an MI that came at the time that his psychotherapist of 8 years was in the process of concluding their work because of changes in her practice, Mr. Moreira tells me that he made the decision to simply live in isolation.  He got rid of most of his furniture because of an infestation of bed bugs in his subsidized apartment building and has been living in a mostly empty apartment in 1 chair since 2008.  He reports that he has essentially found a way to never leave his home, ordering his groceries to be delivered and having all other services and needs accessed through the Internet or other means of having necessities delivered.  He does have 2 close friends, both of whom I met today and who seemed very fond of him.  However, his friend, Dre, who has a history as a , shared with me that his close friendships are extremely limited.  Mr. Moreira reports that some providers in the past have wondered if he has posttraumatic stress disorder related to his difficult childhood.  This was not fully assessed in terms of symptoms during our contact today.      Mr. Moreira had many conversations with the palliative care team early in his admission prior to transfer to the TCU.  He was initially moving toward the possibility of focusing only on comfort care, with the understanding that he would have potentially not survived, given the need for BiPAP support on his  "initial admission.  It appears that the Palliative Care physician and his friend, Dre, were able to provide guidance to decision making.  He worked with respiratory therapy and did find a mask that was helpful for him, apparently having shifted to a CPAP to use only at night.  Unfortunately, he tells me that the mask and CPAP that were working fairly well for him in the acute hospital did not transfer to the TCU with him, and he is frustrated that he is now on a second mask that is not fitting well and not comfortable.  He uses this as an example of the need to \"be attached to machines that aren't working well\" which leads him to feel that he should give up all medical treatment.  Mr. Moreira has apparently not been using any medication to address his mental health issues.  He has not met with any mental health professional since cessation of his contact with the above-mentioned psychotherapist in 2008.      Mr. Moreira was appreciative of this opportunity to meet with Health Psychology.  He indicated that he would appreciate ongoing contact through this TCU stay.      SOCIAL HISTORY:  Mr. Moreira grew up in the [facility] with his mother.  He tells me that his father was significantly older than his mother and a patient at the [facility] where his mother was working.  He apparently was unable to live outside of the [facility], and Mr. Moreira has no clear memories of contact with his father.  Mr Moreira describes that he was considered a gifted student in elementary school and received an excellent education.  He attended the [employer] but did not complete a degree.  He worked for many years as a  and found that he enjoyed interaction with children and was quite good in that position.  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  He states that unfortunately, because of his success in that position, he was promoted to a " "managerial position which he found impossible to tolerate in terms of the face-to-face interactions with people; he left that position abruptly, losing the option of returning to a driving position that he found out he would have been eligible for.  He notes that he has made many decisions over his lifetime because of emotional distress that have resulted in similar poor outcomes and that he has chosen to never attempt to remediate.  As noted above, he has at least 2 very close friends, Janice and Dre.  At least one of them serve as his health care agent.  I observed very affectionate and positive interactions between Mr. Moreira and both of these friends today.      MEDICAL HISTORY:  Mr. Moreira's medical record indicates a history of basal cell carcinoma.  I observed a lesion on his forehead which he states is from a biopsy that was done many, many years ago that \"freaked\" him out and that he never returned to have followup on.  His record also indicates a history of atrial fibrillation, MI and other issues related to his longstanding morbid obesity.  Please see his [facility] electronic medical record for more complete information on his medical history, current admission status and all medications.      PSYCHIATRIC HISTORY:  As above in HPI.  Mr. Moreira reports that he self-medicated for many years with alcohol, but has not used alcohol at all since he was approximately 50 years old.  No other significant psychiatric history was available at the time of this evaluation.      BEHAVIORAL IMPRESSIONS:  Mr. Moreira and I initially met several days ago when I stopped by to introduce myself; at that time, he was very fatigued and asked that we meet later in the week.  Today, I found him in the patient computer room between scheduled rehabilitation therapy sessions.  He was alert and oriented.  He reported his mood as chronically depressed.  He exhibited a somewhat restricted affect.  " Speech was clear, coherent and goal oriented.  While he had some tendency to perseverate on the past, he answered all questions directly and with no apparent attempt to withhold information.  Use of vocabulary, grammar and syntax reflects what appears to be an above average level of intelligence.  He appears to have gained a great deal of insight and understanding of his emotional process from past psychotherapy, but does not appear to have gained any tools to assist him in changing his patterns of behavior.  Insight and judgment appear to be intact.  He exhibited no evidence of distortions of thought or perception.      IMPRESSIONS AND PLAN:  Clarke Burnham is a 67-year-old man currently on the [facility] Transitional Care Unit.  Mr. Moreira has a long history of depression, likely going back to childhood.  He has been essentially isolating himself since 2008, living in a subsidized apartment building with minimal furniture and trying to gain all of his day-to-day needs without leaving his apartment.  Although he has some anxiety that is chronic and it has been suggested that in the past he may have had PTSD, he currently reports no overt symptoms of PTSD or panic symptoms.  It appears that his anxiety appears primarily in the form of anxious rumination.  He does have 2 close friends whom I met today and who appeared to have great affection and a close relationship with him.  At least 1 of these friends serves as his healthcare agent, although I do not see a healthcare directive on file.      Mr. Moreira tells me that he needs some assistance to assure that he is able to get the items that have been lacking him while he has been isolating himself.  He notes that his building does have a  on staff, but that he has not met the new  who apparently just took the position around the time of his initial hospitalization at the end of September.  It may be  helpful for our unit  to make contact with the  in his building to assure that the  in his building is aware of his needs.  It may also be helpful to consider the possibility of referring Mr. Callahan to the [facility], given his mobility difficulties as they have a mobile unit that can do home visits; they also have mental health providers embedded in that clinic.  I will also speak with Mr. Callahan in future conversations about the type of referrals he would appreciate for mental health providers, as I see in his chart that he has indicated he would be open to establishing care with a new community mental health provider.  I will plan to follow him throughout this TCU admission.      DIAGNOSES:   1.  Major depressive disorder, recurrent, moderate to severe (F33.1).   2.  Anxiety disorder, not otherwise specified (F41.9).         SAY RUIZ, PHD, LP         *In accordance with the Rules of the [place] Board of [employer], it is noted that psychological descriptions and scientific procedures underlying psychological evaluations have limitations.  Absolute predictions cannot be made based on information in this report.       D: 10/13/2017 16:47   T: 10/13/2017 21:27   MT:       Name:     AJIT CALLAHAN   MRN:      [id number]        Account:       [id number]   :      1950           Consult Date:  10/13/2017      Document: [id number]       cc: Mathew Olson MD

## 2017-10-14 NOTE — PROGRESS NOTES
Pt up with assist and walker in room. Ate well for supper. Lymphedema dressings rewrapped. Calves are reddened but pt states they are improved from what they were. Received new mask for cpap which pt states fits much better than the last one. Denies pain. Skin intact.

## 2017-10-14 NOTE — PLAN OF CARE
Problem: Goal Outcome Summary  Goal: Goal Outcome Summary  OT: patient continues to make good progress daily. Patient reports some L ankle soreness prior to OT TX . Limited mobility training in Pm oT TX to allow for greater rest of L ankle.

## 2017-10-15 LAB
GLUCOSE BLDC GLUCOMTR-MCNC: 121 MG/DL (ref 70–99)
GLUCOSE BLDC GLUCOMTR-MCNC: 88 MG/DL (ref 70–99)
INR PPP: 2.68 (ref 0.86–1.14)

## 2017-10-15 PROCEDURE — 40000193 ZZH STATISTIC PT WARD VISIT: Performed by: PHYSICAL THERAPIST

## 2017-10-15 PROCEDURE — 12000022 ZZH R&B SNF

## 2017-10-15 PROCEDURE — 97535 SELF CARE MNGMENT TRAINING: CPT | Mod: GO | Performed by: OCCUPATIONAL THERAPIST

## 2017-10-15 PROCEDURE — 40000133 ZZH STATISTIC OT WARD VISIT: Performed by: OCCUPATIONAL THERAPIST

## 2017-10-15 PROCEDURE — 85610 PROTHROMBIN TIME: CPT | Performed by: INTERNAL MEDICINE

## 2017-10-15 PROCEDURE — 40000193 ZZH STATISTIC PT WARD VISIT

## 2017-10-15 PROCEDURE — 97116 GAIT TRAINING THERAPY: CPT | Mod: GP

## 2017-10-15 PROCEDURE — 25000132 ZZH RX MED GY IP 250 OP 250 PS 637: Performed by: INTERNAL MEDICINE

## 2017-10-15 PROCEDURE — 97530 THERAPEUTIC ACTIVITIES: CPT | Mod: GP | Performed by: PHYSICAL THERAPIST

## 2017-10-15 PROCEDURE — 00000146 ZZHCL STATISTIC GLUCOSE BY METER IP

## 2017-10-15 PROCEDURE — 36592 COLLECT BLOOD FROM PICC: CPT | Performed by: INTERNAL MEDICINE

## 2017-10-15 PROCEDURE — 25000128 H RX IP 250 OP 636: Performed by: INTERNAL MEDICINE

## 2017-10-15 PROCEDURE — 97110 THERAPEUTIC EXERCISES: CPT | Mod: GP | Performed by: PHYSICAL THERAPIST

## 2017-10-15 PROCEDURE — 97110 THERAPEUTIC EXERCISES: CPT | Mod: GP

## 2017-10-15 RX ORDER — WARFARIN SODIUM 5 MG/1
10 TABLET ORAL
Status: COMPLETED | OUTPATIENT
Start: 2017-10-15 | End: 2017-10-15

## 2017-10-15 RX ADMIN — POTASSIUM CHLORIDE 40 MEQ: 1500 TABLET, EXTENDED RELEASE ORAL at 20:29

## 2017-10-15 RX ADMIN — Medication 1 G: at 08:00

## 2017-10-15 RX ADMIN — SODIUM CHLORIDE, PRESERVATIVE FREE 5 ML: 5 INJECTION INTRAVENOUS at 08:02

## 2017-10-15 RX ADMIN — ATORVASTATIN CALCIUM 40 MG: 40 TABLET, FILM COATED ORAL at 08:00

## 2017-10-15 RX ADMIN — METFORMIN HYDROCHLORIDE 500 MG: 500 TABLET ORAL at 18:02

## 2017-10-15 RX ADMIN — MULTIPLE VITAMINS W/ MINERALS TAB 1 TABLET: TAB at 08:00

## 2017-10-15 RX ADMIN — METOPROLOL SUCCINATE 200 MG: 100 TABLET, FILM COATED, EXTENDED RELEASE ORAL at 08:00

## 2017-10-15 RX ADMIN — POTASSIUM CHLORIDE 40 MEQ: 1500 TABLET, EXTENDED RELEASE ORAL at 08:00

## 2017-10-15 RX ADMIN — SODIUM CHLORIDE, PRESERVATIVE FREE 5 ML: 5 INJECTION INTRAVENOUS at 06:59

## 2017-10-15 RX ADMIN — ASPIRIN 81 MG CHEWABLE TABLET 81 MG: 81 TABLET CHEWABLE at 08:00

## 2017-10-15 RX ADMIN — LISINOPRIL 10 MG: 10 TABLET ORAL at 08:01

## 2017-10-15 RX ADMIN — TAMSULOSIN HYDROCHLORIDE 0.8 MG: 0.4 CAPSULE ORAL at 08:00

## 2017-10-15 RX ADMIN — FUROSEMIDE 80 MG: 40 TABLET ORAL at 08:00

## 2017-10-15 RX ADMIN — METFORMIN HYDROCHLORIDE 500 MG: 500 TABLET ORAL at 08:00

## 2017-10-15 RX ADMIN — WARFARIN SODIUM 10 MG: 5 TABLET ORAL at 18:02

## 2017-10-15 RX ADMIN — LEVOTHYROXINE SODIUM 150 MCG: 100 TABLET ORAL at 06:49

## 2017-10-15 NOTE — PROGRESS NOTES
SPIRITUAL HEALTH SERVICES  SPIRITUAL ASSESSMENT Progress Note  [facility] ([facility]) TCU     REFERRAL SOURCE: Not Applicable    Checked in with Clarke to see how he was doing.  He thanked me again for our visit on Thursday evening - he stated that he left the interaction feeling a sense of calm and peace.  He felt very good about that. Clarke said he had some therapy appointments this morning/early afternoon that interrupted his eating schedule so he was about to get this dinner.  He asked that I check back another day.    PLAN: Visit Clarke on TCU on 10/19 in the evening    Jt Almazan   Intern  Pager [phone]

## 2017-10-15 NOTE — PLAN OF CARE
Problem: Goal Outcome Summary  Goal: Goal Outcome Summary  Outcome: No Change  Pt verbalized contentment with the increase in endurance level during therapy activities. No complaints of discomfort or respiratory distress. Lymphedema wraps are intact on bilateral L/E. Triple lumen PICC on the right ac, all ports were flushed and heparin locked.

## 2017-10-15 NOTE — PROGRESS NOTES
Pt pleasant and oriented. Denies pain. Up with assist and walker. Pt states earlier today he got dizzy when up on the commode. None since. Up in chair most of the shift. Dressings changed to feet. Lymphedema wraps redone. Lungs clear.

## 2017-10-15 NOTE — PLAN OF CARE
Problem: Goal Outcome Summary  Goal: Goal Outcome Summary  OT: patient reports L ankle better. Some knee strain on L LE from ordeal with commode transfer and CNA 10./14/17 but better today. Full OT TX session tolerated with good effort noted.

## 2017-10-15 NOTE — PLAN OF CARE
Problem: Goal Outcome Summary  Goal: Goal Outcome Summary  Outcome: No Change  Patient reports no new concerns tonight. No complaints of pain during cars. Pt. Is utilizing the CPAP as per routine and tolerating without concerns. Bedside urinal within reach, staff assists with emptying. BLE's wraps intact. Will continue to monitor.

## 2017-10-15 NOTE — PLAN OF CARE
Problem: Goal Outcome Summary  Goal: Goal Outcome Summary  PT:  Pt. encouraged by the increased capacity for physical activity he is experiencing.  Initiated the NuStep today which pt. felt was a great work out.

## 2017-10-16 LAB
ANION GAP SERPL CALCULATED.3IONS-SCNC: 4 MMOL/L (ref 3–14)
BUN SERPL-MCNC: 15 MG/DL (ref 7–30)
CALCIUM SERPL-MCNC: 8.7 MG/DL (ref 8.5–10.1)
CHLORIDE SERPL-SCNC: 104 MMOL/L (ref 94–109)
CO2 SERPL-SCNC: 31 MMOL/L (ref 20–32)
CREAT SERPL-MCNC: 0.64 MG/DL (ref 0.66–1.25)
ERYTHROCYTE [DISTWIDTH] IN BLOOD BY AUTOMATED COUNT: 13.9 % (ref 10–15)
GFR SERPL CREATININE-BSD FRML MDRD: >90 ML/MIN/1.7M2
GLUCOSE BLDC GLUCOMTR-MCNC: 144 MG/DL (ref 70–99)
GLUCOSE BLDC GLUCOMTR-MCNC: 95 MG/DL (ref 70–99)
GLUCOSE SERPL-MCNC: 107 MG/DL (ref 70–99)
HCT VFR BLD AUTO: 49.2 % (ref 40–53)
HGB BLD-MCNC: 15.9 G/DL (ref 13.3–17.7)
INR PPP: 2.34 (ref 0.86–1.14)
MAGNESIUM SERPL-MCNC: 1.8 MG/DL (ref 1.6–2.3)
MCH RBC QN AUTO: 30.5 PG (ref 26.5–33)
MCHC RBC AUTO-ENTMCNC: 32.3 G/DL (ref 31.5–36.5)
MCV RBC AUTO: 94 FL (ref 78–100)
PLATELET # BLD AUTO: 227 10E9/L (ref 150–450)
POTASSIUM SERPL-SCNC: 3.8 MMOL/L (ref 3.4–5.3)
RBC # BLD AUTO: 5.22 10E12/L (ref 4.4–5.9)
SODIUM SERPL-SCNC: 139 MMOL/L (ref 133–144)
WBC # BLD AUTO: 5.4 10E9/L (ref 4–11)

## 2017-10-16 PROCEDURE — 97110 THERAPEUTIC EXERCISES: CPT | Mod: GP

## 2017-10-16 PROCEDURE — 25000132 ZZH RX MED GY IP 250 OP 250 PS 637: Performed by: INTERNAL MEDICINE

## 2017-10-16 PROCEDURE — 97116 GAIT TRAINING THERAPY: CPT | Mod: GP

## 2017-10-16 PROCEDURE — 83735 ASSAY OF MAGNESIUM: CPT | Performed by: INTERNAL MEDICINE

## 2017-10-16 PROCEDURE — 40000133 ZZH STATISTIC OT WARD VISIT: Performed by: OCCUPATIONAL THERAPIST

## 2017-10-16 PROCEDURE — 85610 PROTHROMBIN TIME: CPT | Performed by: INTERNAL MEDICINE

## 2017-10-16 PROCEDURE — 85027 COMPLETE CBC AUTOMATED: CPT | Performed by: INTERNAL MEDICINE

## 2017-10-16 PROCEDURE — 80048 BASIC METABOLIC PNL TOTAL CA: CPT | Performed by: INTERNAL MEDICINE

## 2017-10-16 PROCEDURE — 00000146 ZZHCL STATISTIC GLUCOSE BY METER IP

## 2017-10-16 PROCEDURE — 40000193 ZZH STATISTIC PT WARD VISIT

## 2017-10-16 PROCEDURE — 97535 SELF CARE MNGMENT TRAINING: CPT | Mod: GO | Performed by: OCCUPATIONAL THERAPIST

## 2017-10-16 PROCEDURE — 25000128 H RX IP 250 OP 636: Performed by: INTERNAL MEDICINE

## 2017-10-16 PROCEDURE — 12000022 ZZH R&B SNF

## 2017-10-16 PROCEDURE — 36415 COLL VENOUS BLD VENIPUNCTURE: CPT | Performed by: INTERNAL MEDICINE

## 2017-10-16 RX ADMIN — LEVOTHYROXINE SODIUM 150 MCG: 100 TABLET ORAL at 06:29

## 2017-10-16 RX ADMIN — METFORMIN HYDROCHLORIDE 500 MG: 500 TABLET ORAL at 08:44

## 2017-10-16 RX ADMIN — POTASSIUM CHLORIDE 40 MEQ: 1500 TABLET, EXTENDED RELEASE ORAL at 19:08

## 2017-10-16 RX ADMIN — Medication 1 G: at 08:46

## 2017-10-16 RX ADMIN — WARFARIN SODIUM 12.5 MG: 7.5 TABLET ORAL at 19:09

## 2017-10-16 RX ADMIN — LISINOPRIL 10 MG: 10 TABLET ORAL at 08:46

## 2017-10-16 RX ADMIN — POTASSIUM CHLORIDE 40 MEQ: 1500 TABLET, EXTENDED RELEASE ORAL at 08:45

## 2017-10-16 RX ADMIN — MULTIPLE VITAMINS W/ MINERALS TAB 1 TABLET: TAB at 08:46

## 2017-10-16 RX ADMIN — FUROSEMIDE 80 MG: 40 TABLET ORAL at 08:44

## 2017-10-16 RX ADMIN — METOPROLOL SUCCINATE 200 MG: 100 TABLET, FILM COATED, EXTENDED RELEASE ORAL at 08:44

## 2017-10-16 RX ADMIN — ASPIRIN 81 MG CHEWABLE TABLET 81 MG: 81 TABLET CHEWABLE at 08:46

## 2017-10-16 RX ADMIN — SODIUM CHLORIDE, PRESERVATIVE FREE 5 ML: 5 INJECTION INTRAVENOUS at 08:47

## 2017-10-16 RX ADMIN — SODIUM CHLORIDE, PRESERVATIVE FREE 5 ML: 5 INJECTION INTRAVENOUS at 06:19

## 2017-10-16 RX ADMIN — TAMSULOSIN HYDROCHLORIDE 0.8 MG: 0.4 CAPSULE ORAL at 08:45

## 2017-10-16 RX ADMIN — ATORVASTATIN CALCIUM 40 MG: 40 TABLET, FILM COATED ORAL at 08:46

## 2017-10-16 RX ADMIN — METFORMIN HYDROCHLORIDE 500 MG: 500 TABLET ORAL at 19:09

## 2017-10-16 NOTE — PLAN OF CARE
Problem: Goal Outcome Summary  Goal: Goal Outcome Summary  Outcome: No Change  Patient alert and oriented,denies pain.Bilateral legs washed and wrapped per orders.Right heel dressing changed.Participating in therapies,he had shower today given by  OT person.Urinal @ bedside.

## 2017-10-16 NOTE — PLAN OF CARE
Problem: Goal Outcome Summary  Goal: Goal Outcome Summary  Outcome: Therapy, progress toward functional goals as expected  OT - Full shower assessment completed. Pt able to ambulate 60 ft with min assist. Pt progressing well on lower body bathing requiring set up, supervision, and verbal cues.

## 2017-10-16 NOTE — UTILIZATION REVIEW
Pain Interview  Admission    1. Have you had pain or hurting at any time in the last 5 days?  Yes  2. How much of the time have you experienced pain or hurting over the last 5 days? Frequently  3. Over the past 5 days, has pain made it hard for you to sleep at night?  No  4. Over the past 5 days, have you limited your day-to-day activities because of pain?No  5. Rate pain intensity: Numeric 7       Pain interview on paper completed by Sabrina Navarro RN on 10/13/17

## 2017-10-16 NOTE — PROGRESS NOTES
Patient provided with a new DH2 shoe for his left foot.  His swelling has decreased and he no longer needs and XL, swapped for a sz large DH2 with no pegs removed.

## 2017-10-16 NOTE — PLAN OF CARE
Problem: Goal Outcome Summary  Goal: Goal Outcome Summary  Outcome: No Change  Alert and oriented. Denied any pain during cares this shift. Asleep off and on in between cares cares. CPAP on throughout the shift without problems. Reports no problems with B/B, voids without problems. Urinal at bedside and staff assists with emptying. Wraps and dressings to lower extremities  are clean, dry, and intact. Calls appropriately for assistance. Will continue to monitor.

## 2017-10-16 NOTE — PLAN OF CARE
Problem: Goal Outcome Summary  Goal: Goal Outcome Summary  Outcome: Therapy, progress toward functional goals as expected  PTA: Pt amb 46 ft x1 and 10 ft x1 using ww SBA. w/c follow. Slow pace. Decreased foot clearance noted during 1st distance, improved with 2nd distance (2nd distance performed at end of session). WBOS. Steady throughout. Remains effortful, heavy use of B UEs on ww frame. Seated rest taken after each trial d/t fatigue.     Pt is pleased with overall progress. Remains motivated to work with PT.

## 2017-10-16 NOTE — PLAN OF CARE
Problem: Goal Outcome Summary  Goal: Goal Outcome Summary  Outcome: Therapy, unable to show any progress toward functional goals  PTA: AM PT session cx'd as pt with nursing staff, having B LEs wrapped. -30 min.

## 2017-10-16 NOTE — PROGRESS NOTES
Pt up in chair since the beginning of the shift. In good spirits. Ate all of his supper. Lymphedema wraps rewrapped. Pt is pleased with how much the swelling has gone down in his legs. Has few requests.

## 2017-10-17 ENCOUNTER — APPOINTMENT (OUTPATIENT)
Dept: LAB | Facility: CLINIC | Age: 67
End: 2017-10-17
Attending: INTERNAL MEDICINE
Payer: COMMERCIAL

## 2017-10-17 LAB
GLUCOSE BLDC GLUCOMTR-MCNC: 118 MG/DL (ref 70–99)
GLUCOSE BLDC GLUCOMTR-MCNC: 93 MG/DL (ref 70–99)
INR PPP: 2.32 (ref 0.86–1.14)

## 2017-10-17 PROCEDURE — 25000128 H RX IP 250 OP 636: Performed by: INTERNAL MEDICINE

## 2017-10-17 PROCEDURE — 97116 GAIT TRAINING THERAPY: CPT | Mod: GP

## 2017-10-17 PROCEDURE — 97535 SELF CARE MNGMENT TRAINING: CPT | Mod: GO | Performed by: OCCUPATIONAL THERAPIST

## 2017-10-17 PROCEDURE — 99212 OFFICE O/P EST SF 10 MIN: CPT

## 2017-10-17 PROCEDURE — 00000146 ZZHCL STATISTIC GLUCOSE BY METER IP

## 2017-10-17 PROCEDURE — 25000132 ZZH RX MED GY IP 250 OP 250 PS 637: Performed by: INTERNAL MEDICINE

## 2017-10-17 PROCEDURE — 85610 PROTHROMBIN TIME: CPT | Performed by: INTERNAL MEDICINE

## 2017-10-17 PROCEDURE — 36592 COLLECT BLOOD FROM PICC: CPT | Performed by: INTERNAL MEDICINE

## 2017-10-17 PROCEDURE — 12000022 ZZH R&B SNF

## 2017-10-17 PROCEDURE — 40000133 ZZH STATISTIC OT WARD VISIT: Performed by: OCCUPATIONAL THERAPIST

## 2017-10-17 PROCEDURE — 40000193 ZZH STATISTIC PT WARD VISIT

## 2017-10-17 PROCEDURE — 97110 THERAPEUTIC EXERCISES: CPT | Mod: GP

## 2017-10-17 RX ADMIN — ATORVASTATIN CALCIUM 40 MG: 40 TABLET, FILM COATED ORAL at 09:09

## 2017-10-17 RX ADMIN — Medication 1 G: at 09:08

## 2017-10-17 RX ADMIN — METFORMIN HYDROCHLORIDE 500 MG: 500 TABLET ORAL at 17:55

## 2017-10-17 RX ADMIN — POTASSIUM CHLORIDE 40 MEQ: 1500 TABLET, EXTENDED RELEASE ORAL at 09:06

## 2017-10-17 RX ADMIN — LEVOTHYROXINE SODIUM 150 MCG: 100 TABLET ORAL at 06:02

## 2017-10-17 RX ADMIN — ASPIRIN 81 MG CHEWABLE TABLET 81 MG: 81 TABLET CHEWABLE at 09:06

## 2017-10-17 RX ADMIN — SODIUM CHLORIDE, PRESERVATIVE FREE 5 ML: 5 INJECTION INTRAVENOUS at 09:06

## 2017-10-17 RX ADMIN — MULTIPLE VITAMINS W/ MINERALS TAB 1 TABLET: TAB at 09:09

## 2017-10-17 RX ADMIN — SODIUM CHLORIDE, PRESERVATIVE FREE 5 ML: 5 INJECTION INTRAVENOUS at 07:28

## 2017-10-17 RX ADMIN — TAMSULOSIN HYDROCHLORIDE 0.8 MG: 0.4 CAPSULE ORAL at 09:05

## 2017-10-17 RX ADMIN — WARFARIN SODIUM 12.5 MG: 7.5 TABLET ORAL at 17:55

## 2017-10-17 RX ADMIN — POTASSIUM CHLORIDE 40 MEQ: 1500 TABLET, EXTENDED RELEASE ORAL at 20:52

## 2017-10-17 RX ADMIN — METFORMIN HYDROCHLORIDE 500 MG: 500 TABLET ORAL at 10:28

## 2017-10-17 NOTE — PROGRESS NOTES
[facility] Nurse Inpatient Adult WoundAssessment    Follow up Assessment  Reason for consultation: Evaluate and treat right plantar heel wound and left dorsal foot wound.    Assessment:   Right plantar heel wound due to Pressure Injury  Left dorsal foot wound due to trauma/skin trear    Pressure Injury is Stage 3 Present on admission Yes    Contributing factor of the pressure injury: pressure and immobility  Status: is healing, Stable    Photo: see below    TREATMENT  PLAN    Right plantar heel wound: wash every other day with wound cleanser and gauze. Apply Medihoney to wound base and cover with Mepilex 4x4.  Left dorsal foot wound: wash every other day with wound cleanser and gauze. Cover with Adaptic followed by Optifoam under lymphedema wraps.  Bilateral lower legs: wash with soap and water and wash cloth prior to lymphedema wrap placement. Moisturize freely with Sween 24.  Orders Written  [facility] Nurse follow-up plan:weekly  Nursing to notify the Provider(s) and re-consult the [facility] Nurse if wound(s) deteriorates or new skin concern.    Patient History  According to provider note(s):  Clarke Sotelo is a 67 year old male with a history notable for morbid obesity, atrial fibrillation, HFrEF, diabetes, PVD, and untreated depression that was admitted following a fall and CP/SOB on 9/23.  Transferred to MICU on 9/24 for AMS with acute hypoxic/hypercarbic respiratory failure and hypotension requiring initiation of BiPAP and pressors.  Much improved following nasal airway and BiPAP, off pressors and transferred to general medicine for further management.    Patient transferred to TCU on 10/8/2017 for further rehab.    Objective Data   Containment of urine/stool: Continent of bowel and Continent of bladder    Current Diet/ Nutrition:    Active Diet Order      Combination Diet [id number] Calories: Moderate Consistent CHO (4-6 CHO units/meal)    Output:   I/O last 3 completed shifts:  In: 600 [P.O.:600]  Out: 9187  "[Urine:4810]    Skin Assessment: Peterson Peterson Score  Av.2  Min: 13  Max: 19                                Peterson Q No Data Recorded                     NSRAS No Data Recorded     Labs:     Recent Labs  Lab 10/17/17  0730 10/16/17  0624   HGB  --  15.9   INR 2.32* 2.34*   WBC  --  5.4         No lab results found in last 7 days.    Physical Exam    Skin assessment:   Focused skin inspection: bilateral lower legs and feet. Legs with discoloration due to chronic edema/hemosiderin staining. Overall improving in edema control and epidermal peeling    Wound Location:  Right plantar heel    10/17/17 right posterior heel    Wound History: Present on admission. Had been staged as \"unstageable\" due to slough. Now clean, granular base present  Measurements (length x width x depth, in cm) Now two wounds with bridge of skin between 2 cm x 2 cm x 0.1 cm and 0.5 cm x 0.5 cm x 0.1 cm   Wound Base:  100% granulation tissue  Tunneling N/A  Undermining N/A  Palpation of the wound bed: normal   Periwound skin: intact with thick callous  Color: normal and consistent with surrounding tissue  Temperature: normal   Drainage: scant  Description of drainage: serosanguinous  Odor: none  Pain: denies     Wound Location:  Left plantar foot  Wound History: Present on admission. Patient states his sandal strap had been covering this area and was most likely injured when removing his shoe.  Measurements (length x width x depth, in cm) 2.5 cm x 1 cm  x  0 cm   Wound Base:  100% thin brown scab  Palpation of the wound bed: normal   Periwound skin: intact  Color: purple due to chronic lymphedema  Temperature: normal   Drainage:, none  Description of drainage: none  Odor: none  Pain: denies     Interventions  Current support surface: Standard  Atmos Air    Current off-loading measures: Foam padding and Pillows under calves  Visual inspection of wound(s) completed  Wound Care:was done per plan of care    Cleansing with wound cleanser " solution  Supplies: Reviewed, available at bedside  Education provided today: wound care, compression    Discussed plan of care with Patient    Face to face time: 30 minutes

## 2017-10-17 NOTE — PLAN OF CARE
Problem: Goal Outcome Summary  Goal: Goal Outcome Summary  Outcome: No Change  Pt alert and oriented and able to make needs known. Awake for most of night and up in chair at 0600. Wore CPAP machine for half of night and took it off d/t feeing claustrophobic. No complaints of pain. Urinal by bedside with staff emptying.

## 2017-10-17 NOTE — PLAN OF CARE
Problem: Goal Outcome Summary  Goal: Goal Outcome Summary  Outcome: Therapy, progress toward functional goals as expected  PTA: Pt progressing well towards PT goals. SBA sit<>stands using ww. SBA amb <75 ft using ww. Pt enjoyed time spent outdoors today. Remains motivated to work with PT.

## 2017-10-17 NOTE — PLAN OF CARE
Problem: Goal Outcome Summary  Goal: Goal Outcome Summary  Outcome: Improving  Patient up with PT,denies pain,held morning lasix and blood pressure medications per parameters.SBA with a walker to transfer.No shortness of breath.Bilateral wraps intact.PICC in right arm intact.Pleasant.

## 2017-10-17 NOTE — PLAN OF CARE
Problem: Goal Outcome Summary  Goal: Goal Outcome Summary  Pt alert and oriented. Up in chair most of shift. Denies pain. Had large bowel movement this shift. BLE leg wraps intact, were changed on day shift. Blood pressure 83/38 with afternoon vitals, rechecked 45 minutes later, 90/68. Pt asymptomatic. Wears CPAP at night. Denies any new concerns.

## 2017-10-17 NOTE — PLAN OF CARE
Problem: Goal Outcome Summary  Goal: Goal Outcome Summary  Outcome: Therapy, progress toward functional goals as expected  OT - Pt completed tub transfer simulated to home environment with shower chair, leg , and grab bar on right using SBA. Therapist provided education and training on shower bench option for a wider base of support as well as how to purchase at a variety of vendors. Pt open to the idea of asking apartment management to install grab bars for safe tub transfers at home. Pt completed dynavision screening and scored 35. Therapist provided education on results and pt in understanding that there is room for improvement. Therapist completed MoCA assessment, pt scored 28/30 indicating no cognitive impairment. Results discussed with pt and pt in understanding.

## 2017-10-18 LAB
GLUCOSE BLDC GLUCOMTR-MCNC: 131 MG/DL (ref 70–99)
GLUCOSE BLDC GLUCOMTR-MCNC: 86 MG/DL (ref 70–99)
INR PPP: 2.69 (ref 0.86–1.14)

## 2017-10-18 PROCEDURE — 40000193 ZZH STATISTIC PT WARD VISIT: Performed by: PHYSICAL THERAPIST

## 2017-10-18 PROCEDURE — 36415 COLL VENOUS BLD VENIPUNCTURE: CPT | Performed by: INTERNAL MEDICINE

## 2017-10-18 PROCEDURE — 40000133 ZZH STATISTIC OT WARD VISIT: Performed by: OCCUPATIONAL THERAPIST

## 2017-10-18 PROCEDURE — 25000128 H RX IP 250 OP 636: Performed by: INTERNAL MEDICINE

## 2017-10-18 PROCEDURE — 12000022 ZZH R&B SNF

## 2017-10-18 PROCEDURE — 25000132 ZZH RX MED GY IP 250 OP 250 PS 637: Performed by: INTERNAL MEDICINE

## 2017-10-18 PROCEDURE — 97116 GAIT TRAINING THERAPY: CPT | Mod: GP

## 2017-10-18 PROCEDURE — 97110 THERAPEUTIC EXERCISES: CPT | Mod: GO | Performed by: OCCUPATIONAL THERAPIST

## 2017-10-18 PROCEDURE — 97110 THERAPEUTIC EXERCISES: CPT | Mod: GP | Performed by: PHYSICAL THERAPIST

## 2017-10-18 PROCEDURE — 25000132 ZZH RX MED GY IP 250 OP 250 PS 637

## 2017-10-18 PROCEDURE — 97535 SELF CARE MNGMENT TRAINING: CPT | Mod: GO | Performed by: OCCUPATIONAL THERAPIST

## 2017-10-18 PROCEDURE — 40000193 ZZH STATISTIC PT WARD VISIT

## 2017-10-18 PROCEDURE — 85610 PROTHROMBIN TIME: CPT | Performed by: INTERNAL MEDICINE

## 2017-10-18 PROCEDURE — 97530 THERAPEUTIC ACTIVITIES: CPT | Mod: GP | Performed by: PHYSICAL THERAPIST

## 2017-10-18 PROCEDURE — 00000146 ZZHCL STATISTIC GLUCOSE BY METER IP

## 2017-10-18 RX ORDER — LISINOPRIL 5 MG/1
5 TABLET ORAL DAILY
Status: DISCONTINUED | OUTPATIENT
Start: 2017-10-19 | End: 2017-10-23

## 2017-10-18 RX ADMIN — SODIUM CHLORIDE, PRESERVATIVE FREE 5 ML: 5 INJECTION INTRAVENOUS at 06:39

## 2017-10-18 RX ADMIN — MULTIPLE VITAMINS W/ MINERALS TAB 1 TABLET: TAB at 08:05

## 2017-10-18 RX ADMIN — METOPROLOL SUCCINATE 200 MG: 100 TABLET, FILM COATED, EXTENDED RELEASE ORAL at 08:04

## 2017-10-18 RX ADMIN — POTASSIUM CHLORIDE 40 MEQ: 1500 TABLET, EXTENDED RELEASE ORAL at 08:03

## 2017-10-18 RX ADMIN — LEVOTHYROXINE SODIUM 150 MCG: 100 TABLET ORAL at 06:14

## 2017-10-18 RX ADMIN — ASPIRIN 81 MG CHEWABLE TABLET 81 MG: 81 TABLET CHEWABLE at 08:03

## 2017-10-18 RX ADMIN — ATORVASTATIN CALCIUM 40 MG: 40 TABLET, FILM COATED ORAL at 08:03

## 2017-10-18 RX ADMIN — Medication 1 G: at 08:05

## 2017-10-18 RX ADMIN — TAMSULOSIN HYDROCHLORIDE 0.8 MG: 0.4 CAPSULE ORAL at 08:06

## 2017-10-18 RX ADMIN — WARFARIN SODIUM 12.5 MG: 7.5 TABLET ORAL at 17:11

## 2017-10-18 RX ADMIN — POTASSIUM CHLORIDE 40 MEQ: 1500 TABLET, EXTENDED RELEASE ORAL at 19:59

## 2017-10-18 RX ADMIN — METFORMIN HYDROCHLORIDE 500 MG: 500 TABLET ORAL at 17:11

## 2017-10-18 RX ADMIN — SODIUM CHLORIDE, PRESERVATIVE FREE 5 ML: 5 INJECTION INTRAVENOUS at 08:08

## 2017-10-18 RX ADMIN — METFORMIN HYDROCHLORIDE 500 MG: 500 TABLET ORAL at 08:15

## 2017-10-18 NOTE — PROGRESS NOTES
TCU Care Coordinator Progress Note    Admission date: 10/8/2017    Data: Clarke Moreira is a 68 yo male on TCU for rehab following hospitalization for respiratory failure, fall at home.    Intervention: Met with patient to introduce the role of Care Coordinator and to begin discussion of anticipated DC planning needs. Initiated referral to [facility] for home care services of SN/PT/OT. Initiated [facility] referral for Coumadin teaching.    Assessment: Patient will have above home care needs. Has every other day wound care to heel, which he is not sure he will be able to do himself until he regains better flexibility. He has been on Metformin for years, does not routinely monitor his blood sugars. Coumadin is new, will have teaching needs on this med. He has a CPAP, which is also new, contact made with  [facility] to see what they need to provide home CPAP unit. Awaiting plan from them. Patient may need sleep study.    Plan: Will continue to follow DC planning needs throughout TCU stay. Potential DC early next week if meets therapy goals and remains medically stable.    Jacob William RN, BSN, Patient Care Management Coordinator  [facility]  [address], [address] [place] [address]  [email or web address]  [email or web address]   Desk: [phone] TCU Main [phone] Fax [phone] Pager [phone]

## 2017-10-18 NOTE — PLAN OF CARE
Problem: Goal Outcome Summary  Goal: Goal Outcome Summary  Outcome: No Change  Pt.A&Ox4. Initial rounds pt.awake and watching TV. Denied pain or discomfort. Denies CP/SOB. Offered no c/o's or requests. Continent using urinal indep.- staff empties. BLE lymphedema wraps intact.

## 2017-10-18 NOTE — PLAN OF CARE
"Problem: Goal Outcome Summary  Goal: Goal Outcome Summary  Outcome: Therapy, progress toward functional goals as expected  OT- Patient tolerated standing at sink to complete cares today with support of sink and wheelchair placed directly behind. Patient reports not sleeping well the \"last few nights\" and some stomach \"upset\" but wiling to partiicipate      "

## 2017-10-18 NOTE — PLAN OF CARE
Problem: Goal Outcome Summary  Goal: Goal Outcome Summary  Outcome: No Change  Alert and oriented x 4. Denies pain. Denies SOB. No new concern noted. Bilateral compression stocking on. No problem with appetite and B/B. Assist of one for mobility and transfer. Continue to monitor.

## 2017-10-18 NOTE — PLAN OF CARE
Problem: Goal Outcome Summary  Goal: Goal Outcome Summary  Alert and oriented x4. No complain of pain this shift. Has triple lumen PICC to right arm. Dressing clean and intact. Wraps to jen L/E intact. In bed, CPAP on. Continue with current POC.

## 2017-10-18 NOTE — PLAN OF CARE
Problem: Goal Outcome Summary  Goal: Goal Outcome Summary  Outcome: Improving  Pt tolerated 10 min on Nustep this PM but only 55 ft amb with FWW. In AM amb outdoors 250 ft with FWW, stand stops every 50 ft. Gait pattern improved. Cont per POC

## 2017-10-19 LAB
ANION GAP SERPL CALCULATED.3IONS-SCNC: 4 MMOL/L (ref 3–14)
BUN SERPL-MCNC: 7 MG/DL (ref 7–30)
CALCIUM SERPL-MCNC: 9 MG/DL (ref 8.5–10.1)
CHLORIDE SERPL-SCNC: 105 MMOL/L (ref 94–109)
CO2 SERPL-SCNC: 29 MMOL/L (ref 20–32)
CREAT SERPL-MCNC: 0.64 MG/DL (ref 0.66–1.25)
ERYTHROCYTE [DISTWIDTH] IN BLOOD BY AUTOMATED COUNT: 14.2 % (ref 10–15)
GFR SERPL CREATININE-BSD FRML MDRD: >90 ML/MIN/1.7M2
GLUCOSE BLDC GLUCOMTR-MCNC: 101 MG/DL (ref 70–99)
GLUCOSE BLDC GLUCOMTR-MCNC: 111 MG/DL (ref 70–99)
GLUCOSE SERPL-MCNC: 109 MG/DL (ref 70–99)
HCT VFR BLD AUTO: 48.1 % (ref 40–53)
HGB BLD-MCNC: 15.4 G/DL (ref 13.3–17.7)
INR PPP: 3 (ref 0.86–1.14)
MAGNESIUM SERPL-MCNC: 1.8 MG/DL (ref 1.6–2.3)
MCH RBC QN AUTO: 30.4 PG (ref 26.5–33)
MCHC RBC AUTO-ENTMCNC: 32 G/DL (ref 31.5–36.5)
MCV RBC AUTO: 95 FL (ref 78–100)
PLATELET # BLD AUTO: 217 10E9/L (ref 150–450)
POTASSIUM SERPL-SCNC: 4.2 MMOL/L (ref 3.4–5.3)
RBC # BLD AUTO: 5.07 10E12/L (ref 4.4–5.9)
SODIUM SERPL-SCNC: 138 MMOL/L (ref 133–144)
WBC # BLD AUTO: 5.9 10E9/L (ref 4–11)

## 2017-10-19 PROCEDURE — 00000146 ZZHCL STATISTIC GLUCOSE BY METER IP

## 2017-10-19 PROCEDURE — 25000128 H RX IP 250 OP 636: Performed by: INTERNAL MEDICINE

## 2017-10-19 PROCEDURE — 97110 THERAPEUTIC EXERCISES: CPT | Mod: GP | Performed by: PHYSICAL THERAPIST

## 2017-10-19 PROCEDURE — 85027 COMPLETE CBC AUTOMATED: CPT | Performed by: INTERNAL MEDICINE

## 2017-10-19 PROCEDURE — 80048 BASIC METABOLIC PNL TOTAL CA: CPT | Performed by: INTERNAL MEDICINE

## 2017-10-19 PROCEDURE — 25000132 ZZH RX MED GY IP 250 OP 250 PS 637: Performed by: INTERNAL MEDICINE

## 2017-10-19 PROCEDURE — 40000133 ZZH STATISTIC OT WARD VISIT: Performed by: OCCUPATIONAL THERAPIST

## 2017-10-19 PROCEDURE — 40000193 ZZH STATISTIC PT WARD VISIT: Performed by: PHYSICAL THERAPIST

## 2017-10-19 PROCEDURE — 97530 THERAPEUTIC ACTIVITIES: CPT | Mod: GP | Performed by: PHYSICAL THERAPIST

## 2017-10-19 PROCEDURE — 25000132 ZZH RX MED GY IP 250 OP 250 PS 637

## 2017-10-19 PROCEDURE — 97535 SELF CARE MNGMENT TRAINING: CPT | Mod: GO | Performed by: OCCUPATIONAL THERAPIST

## 2017-10-19 PROCEDURE — 83735 ASSAY OF MAGNESIUM: CPT | Performed by: INTERNAL MEDICINE

## 2017-10-19 PROCEDURE — 12000022 ZZH R&B SNF

## 2017-10-19 PROCEDURE — 36592 COLLECT BLOOD FROM PICC: CPT | Performed by: INTERNAL MEDICINE

## 2017-10-19 PROCEDURE — 97110 THERAPEUTIC EXERCISES: CPT | Mod: GO | Performed by: OCCUPATIONAL THERAPIST

## 2017-10-19 PROCEDURE — 97116 GAIT TRAINING THERAPY: CPT | Mod: GP | Performed by: PHYSICAL THERAPIST

## 2017-10-19 PROCEDURE — 85610 PROTHROMBIN TIME: CPT | Performed by: INTERNAL MEDICINE

## 2017-10-19 RX ORDER — WARFARIN SODIUM 5 MG/1
10 TABLET ORAL
Status: COMPLETED | OUTPATIENT
Start: 2017-10-19 | End: 2017-10-19

## 2017-10-19 RX ADMIN — METFORMIN HYDROCHLORIDE 500 MG: 500 TABLET ORAL at 08:56

## 2017-10-19 RX ADMIN — METOPROLOL SUCCINATE 200 MG: 100 TABLET, FILM COATED, EXTENDED RELEASE ORAL at 08:54

## 2017-10-19 RX ADMIN — MULTIPLE VITAMINS W/ MINERALS TAB 1 TABLET: TAB at 08:54

## 2017-10-19 RX ADMIN — ATORVASTATIN CALCIUM 40 MG: 40 TABLET, FILM COATED ORAL at 08:56

## 2017-10-19 RX ADMIN — SODIUM CHLORIDE, PRESERVATIVE FREE 5 ML: 5 INJECTION INTRAVENOUS at 08:57

## 2017-10-19 RX ADMIN — LEVOTHYROXINE SODIUM 150 MCG: 100 TABLET ORAL at 07:03

## 2017-10-19 RX ADMIN — ASPIRIN 81 MG CHEWABLE TABLET 81 MG: 81 TABLET CHEWABLE at 08:55

## 2017-10-19 RX ADMIN — WARFARIN SODIUM 10 MG: 5 TABLET ORAL at 18:24

## 2017-10-19 RX ADMIN — SODIUM CHLORIDE, PRESERVATIVE FREE 5 ML: 5 INJECTION INTRAVENOUS at 08:15

## 2017-10-19 RX ADMIN — POTASSIUM CHLORIDE 40 MEQ: 1500 TABLET, EXTENDED RELEASE ORAL at 08:55

## 2017-10-19 RX ADMIN — METFORMIN HYDROCHLORIDE 500 MG: 500 TABLET ORAL at 18:24

## 2017-10-19 RX ADMIN — TAMSULOSIN HYDROCHLORIDE 0.8 MG: 0.4 CAPSULE ORAL at 08:55

## 2017-10-19 RX ADMIN — POTASSIUM CHLORIDE 40 MEQ: 1500 TABLET, EXTENDED RELEASE ORAL at 19:49

## 2017-10-19 RX ADMIN — Medication 1 G: at 08:55

## 2017-10-19 NOTE — PLAN OF CARE
Problem: Goal Outcome Summary  Goal: Goal Outcome Summary  Outcome: Therapy, progress toward functional goals as expected  OT - Pt trialing commode overlay in bathroom of room. Pt transfers from recliner to commode using 2WW and SBA, verbalizes being comfortable with new set up.  Pt demonstrates increasing independence with UE HEP with red theraband and verbalizes appreciation for providing HEP for increased time structuring during the day.

## 2017-10-19 NOTE — PLAN OF CARE
"Problem: Goal Outcome Summary  Goal: Goal Outcome Summary  Outcome: No Change  Pt.A&Ox4. Sleeping off and on. Cpap in continued use from 2000 > 2300 per pt.report then requested off and has been using off and on through the night (mostly off). Writer offered to change back to previously used full face mask as he was able to sleep through the night with it on but pt.refused, stated he is not yet comfortable with this mask but is \"working on it\" as this is the machine he was told would be used @ home. Denies pain or discomfort. No c/o's CP/SOB. Continent using urinal indep.- staff empties. BLE lymphedema wraps intact.      "

## 2017-10-19 NOTE — PLAN OF CARE
Problem: Goal Outcome Summary  Goal: Goal Outcome Summary  Pt is alert and able to communicate needs. Denies pain and SOB. Saw WO nurse yesterday who changed dressings and applied edema wraps so dressing change no due till tomorrow. Pt had bowel movement today. Because pt sometimes feels claustrophobic in CPAP mask, pt puts it on intermittently throughout night and will call when wanting to put it on.

## 2017-10-19 NOTE — PLAN OF CARE
10/19/17 I saw the patient(pt) in his room for PLC Warfarin education.Pt very attentive,asked a few questions,able to answer teach back and verbalized understanding of content presented.See education flow sheet.Continue to reinforce information.Guide to Warfarin booklet given and reviewed with the pt today.

## 2017-10-19 NOTE — PLAN OF CARE
"Problem: Goal Outcome Summary  Goal: Goal Outcome Summary  PT: Ambulated 150ft FWW, CGA for AM session. 5\" platform side-stepping and front stepping exercises 2 x 5 B ea. PM session outdoor ambulation 320ft with CGA, FWW, w/c follow. [place] step length allowing for better toe clearance, better control of B LE as noted by pt. Feels confident about progress with gait, and walking today up ramp with 1/12 incline and through doors of entrance.      "

## 2017-10-20 LAB
GLUCOSE BLDC GLUCOMTR-MCNC: 108 MG/DL (ref 70–99)
GLUCOSE BLDC GLUCOMTR-MCNC: 88 MG/DL (ref 70–99)
GLUCOSE BLDC GLUCOMTR-MCNC: 97 MG/DL (ref 70–99)
INR PPP: 2.69 (ref 0.86–1.14)

## 2017-10-20 PROCEDURE — 97110 THERAPEUTIC EXERCISES: CPT | Mod: GP

## 2017-10-20 PROCEDURE — 25000132 ZZH RX MED GY IP 250 OP 250 PS 637

## 2017-10-20 PROCEDURE — 97530 THERAPEUTIC ACTIVITIES: CPT | Mod: GP

## 2017-10-20 PROCEDURE — 00000146 ZZHCL STATISTIC GLUCOSE BY METER IP

## 2017-10-20 PROCEDURE — 12000022 ZZH R&B SNF

## 2017-10-20 PROCEDURE — 36415 COLL VENOUS BLD VENIPUNCTURE: CPT | Performed by: INTERNAL MEDICINE

## 2017-10-20 PROCEDURE — 99309 SBSQ NF CARE MODERATE MDM 30: CPT | Performed by: INTERNAL MEDICINE

## 2017-10-20 PROCEDURE — 40000133 ZZH STATISTIC OT WARD VISIT: Performed by: OCCUPATIONAL THERAPIST

## 2017-10-20 PROCEDURE — 85610 PROTHROMBIN TIME: CPT | Performed by: INTERNAL MEDICINE

## 2017-10-20 PROCEDURE — 97116 GAIT TRAINING THERAPY: CPT | Mod: GP

## 2017-10-20 PROCEDURE — 40000193 ZZH STATISTIC PT WARD VISIT

## 2017-10-20 PROCEDURE — 25000132 ZZH RX MED GY IP 250 OP 250 PS 637: Performed by: INTERNAL MEDICINE

## 2017-10-20 PROCEDURE — 97535 SELF CARE MNGMENT TRAINING: CPT | Mod: GO | Performed by: OCCUPATIONAL THERAPIST

## 2017-10-20 PROCEDURE — 25000128 H RX IP 250 OP 636: Performed by: INTERNAL MEDICINE

## 2017-10-20 RX ORDER — METOPROLOL SUCCINATE 100 MG/1
100 TABLET, EXTENDED RELEASE ORAL DAILY
Status: DISCONTINUED | OUTPATIENT
Start: 2017-10-21 | End: 2017-10-27 | Stop reason: HOSPADM

## 2017-10-20 RX ORDER — FUROSEMIDE 40 MG
40 TABLET ORAL DAILY
Status: DISCONTINUED | OUTPATIENT
Start: 2017-10-21 | End: 2017-10-24

## 2017-10-20 RX ORDER — ASCORBIC ACID 500 MG
500 TABLET ORAL DAILY
Status: DISCONTINUED | OUTPATIENT
Start: 2017-10-21 | End: 2017-10-27 | Stop reason: HOSPADM

## 2017-10-20 RX ORDER — ZINC SULFATE 50(220)MG
220 CAPSULE ORAL DAILY
Status: DISCONTINUED | OUTPATIENT
Start: 2017-10-21 | End: 2017-10-27 | Stop reason: HOSPADM

## 2017-10-20 RX ADMIN — SODIUM CHLORIDE, PRESERVATIVE FREE 5 ML: 5 INJECTION INTRAVENOUS at 06:48

## 2017-10-20 RX ADMIN — POTASSIUM CHLORIDE 40 MEQ: 1500 TABLET, EXTENDED RELEASE ORAL at 08:19

## 2017-10-20 RX ADMIN — SODIUM CHLORIDE, PRESERVATIVE FREE 5 ML: 5 INJECTION INTRAVENOUS at 08:18

## 2017-10-20 RX ADMIN — MULTIPLE VITAMINS W/ MINERALS TAB 1 TABLET: TAB at 08:19

## 2017-10-20 RX ADMIN — WARFARIN SODIUM 12.5 MG: 7.5 TABLET ORAL at 17:59

## 2017-10-20 RX ADMIN — ATORVASTATIN CALCIUM 40 MG: 40 TABLET, FILM COATED ORAL at 08:19

## 2017-10-20 RX ADMIN — METFORMIN HYDROCHLORIDE 500 MG: 500 TABLET ORAL at 17:59

## 2017-10-20 RX ADMIN — TAMSULOSIN HYDROCHLORIDE 0.8 MG: 0.4 CAPSULE ORAL at 08:19

## 2017-10-20 RX ADMIN — SODIUM CHLORIDE, PRESERVATIVE FREE 5 ML: 5 INJECTION INTRAVENOUS at 06:46

## 2017-10-20 RX ADMIN — SODIUM CHLORIDE, PRESERVATIVE FREE 5 ML: 5 INJECTION INTRAVENOUS at 06:20

## 2017-10-20 RX ADMIN — POTASSIUM CHLORIDE 40 MEQ: 1500 TABLET, EXTENDED RELEASE ORAL at 20:04

## 2017-10-20 RX ADMIN — METFORMIN HYDROCHLORIDE 500 MG: 500 TABLET ORAL at 08:19

## 2017-10-20 RX ADMIN — ASPIRIN 81 MG CHEWABLE TABLET 81 MG: 81 TABLET CHEWABLE at 08:19

## 2017-10-20 RX ADMIN — Medication 1 G: at 08:19

## 2017-10-20 RX ADMIN — LEVOTHYROXINE SODIUM 150 MCG: 100 TABLET ORAL at 06:29

## 2017-10-20 NOTE — CONSULTS
"  Health Psychology                  Clinic    Department of Medicine  Stephanie Gray, Ph.D., L.P. [phone]                          [facility]  [facility] Tatum Hameed, Ph.D.,  L.P. [phone]                 3rd Floor  [place] Mail [address]   Erick Gomez, Ph.D., CALVIN.MAYITO., L.P. [phone]     [address] Sybil Guevara, Ph.D., L.P. [phone]            [place] [address]           Carolyn Madrid, Ph.D., L.P. [phone]     Inpatient Health Psychology Consultation*    DATE OF SERVICE:  10/20/2017     Clinical Information:  Met with Mr Moreira to check in on his progress and emotional status. He continues to report high levels of anxiety in general. At the same time, he talks about having learned a great deal from this hospitalization and the positive, kind, and helpful people that he has encountered. Because of this, he reports that he feels much more relaxed and safe in this environment than he has felt outside of his home in some time. Talked more than once about how anxiety is so strong for him and the internal dialogue that can be triggered by interactions with others is so painful that it led to his living as \"an urban hermit\" in order to not have to interact with people in ways that would lead to experiencing those painful reactions. In the course of our time today I was able to see him interact with a rehab staff member with focus on equipment that he will need for discharge. I gave him feedback following that conversation that I witnessed that he appeared to navigate the conversation comfortably, despite the fact that it included some differences of perspective that he might have perceived as conflict. In the past, this might have led to a very different reaction. He responded that he has learned to trust this individual and so was willing to allow her recommendations to take the " lead.  Strong encouragement to consider establishing care with a psychologist or other psychotherapist with whom he could continue working. I noted his report that psychotherapy in the past had been helpful on some occasions, and also the fact that he appears to have internalized much of what he did learn in that realm and has really excellent insight about his own emotional process and how he reacts in ways that lead to negative outcomes or situations. Provided information about two options for home-based mental health services that might be helpful in this regard, as he has been isolating himself for so long, with such high levels of what appears to be social anxiety and/or agoraphobia that it would be unrealistic to expect that he will be able to be out and about with many clinic appointments initially. He was responsive to these options.  Assessment: Anxiety about discharge high, and will benefit from a great deal of support around all discharge related issues.  Diagnosis:    1.  Major depressive disorder, recurrent, moderate to severe (F33.1).   2.  Anxiety disorder, not otherwise specified (F41.9).   Recommendations/Plan: Provide clear and concrete information about all aspects of discharge. Recommend that social work be included in any home care referrals. I will provide Mr Moreira with information about [facility] who have the option of home-based mental health services. He and I also talked about his difficulty accessing providers at [facility] where he has received medical care for years, but whose location has moved from across the street from his building. He tells me that the difficulty with getting to clinic has reduced his frequency of contact with his medical providers to an annual visit to be checked and get prescriptions for 12 months. I described the option of the [facility] which has a mobile unit for patients with difficulty with access and he  thought it was intriguing. I also believe that getting him connected to medical transportation services, which he likely would qualify for would facilitate ease of access to providers. I will provide him with information about the home-based psychotherapy option.  Time Spent with Patient: 43 minutes  Service Provided: Individual psychotherapy   Provider: Stephanie Gray, Ph.D,    Provider Pager: [phone]   Provider Phone: [phone]

## 2017-10-20 NOTE — UTILIZATION REVIEW
Pain Interview  14 day    1. Have you had pain or hurting at any time in the last 5 days?  Yes  2. How much of the time have you experienced pain or hurting over the last 5 days? Almost Constantly  3. Over the past 5 days, has pain made it hard for you to sleep at night?  No  4. Over the past 5 days, have you limited your day-to-day activities because of pain?Yes  5. Rate pain intensity: Numeric 6

## 2017-10-20 NOTE — PROGRESS NOTES
"[facility], [place]   Internal Medicine Daily Note           Interval History/Events     Hand off taken from Dr. Delarosa  Reports doing well  Denies any nausea, vomiting, chest pain  Exertional shortness of breath  No cough.        Review of Systems        4 point ROS including Respiratory, CV, GI and , other than that noted above is negative      Medications   I have reviewed current medications  in the \"current medication\" section of Epic.  Relevant changes include:     Physical Exam   General:       Vital signs:    Blood pressure 100/64, pulse 89, temperature 97.8  F (36.6  C), temperature source Oral, resp. rate 16, height 1.829 m (6'), weight (!) 177.6 kg (391 lb 8 oz), SpO2 96 %.  Estimated body mass index is 53.1 kg/(m^2) as calculated from the following:    Height as of this encounter: 1.829 m (6').    Weight as of this encounter: 177.6 kg (391 lb 8 oz).      Intake/Output Summary (Last 24 hours) at 10/20/17 0952  Last data filed at 10/20/17 0746   Gross per 24 hour   Intake              240 ml   Output             1700 ml   Net            -1460 ml      HEENT: No icterus, no pallor  Cardiovascular: S1, S2 normal   Respiratory:  B/L CTA  GI/Abdomen: Neurology: Alert, awake, and oriented. No tremors.   Extremities: No pretibial edema  Skin: No rashes.      Laboratory and Imaging Studies     I have reviewed  laboratory and imaging studies in the Epic. Pertinent findings are as below:    BMP  Recent Labs  Lab 10/19/17  0813 10/16/17  0624    139   POTASSIUM 4.2 3.8   CHLORIDE 105 104   CHERI 9.0 8.7   CO2 29 31   BUN 7 15   CR 0.64* 0.64*   * 107*     CBC  Recent Labs  Lab 10/19/17  0813 10/16/17  0624   WBC 5.9 5.4   RBC 5.07 5.22   HGB 15.4 15.9   HCT 48.1 49.2   MCV 95 94   MCH 30.4 30.5   MCHC 32.0 32.3   RDW 14.2 13.9    227     INR  Recent Labs  Lab 10/20/17  0646 10/19/17  0813 10/18/17  0647 10/17/17  0730   INR 2.69* 3.00* 2.69* 2.32*     Ts lab " results found in last 7 days.   PANCNo lab results found in last 7 days.        Impression/Plan      67 year old male with a history notable for morbid obesity, atrial fibrillation, HFrEF, diabetes, PVD, and untreated depression that was admitted following a fall and CP/SOB on 9/23. Hospital course complicated by  AMS with acute hypoxic/hypercarbic respiratory failure and hypotension requiring initiation of BiPAP and pressors. Transferred to  TCU on 10/09 for ongoing rehabilitation      #Acute on Chronic Hypercapnic Respiratory Failure  #Evidence of Pulmonary Hypertension  #Probable Obesity hypoventilation syndrome  Developed AMS, A fib with RVR to 150-160s and respiratory distress early AM 9/24/17. Sudden decompensated respiratory status could have been secondary to flash pulmonary edema secondary to a-fib with RVR, exacerbation of an obstructive or restrictive lung pathology, infectious causes (although infectious respiratory panel negative) and worsening of obesity hypoventilation syndrome. Improved with BIPAP. Currently on CPAP.  - Continue CPAP at night,  - Goal O2 to keep saturations > 88%  - Continue DuoNebs QID/PRN   - Incentive spirometry   - DC home with  CPAP at the time of leaving TCU      #Atrial fibrillation with RVR - improving  History of A fib in the past, not anticoagulated at home but on metoprolol for rate control. CHADS-VaSC 5.   Received  amiodarone load and 6hr drip on arrival to MICU when in afib with RVR and hypotension. However, his atrial fibrillation improved once respiratory status was stabilized. Rate improved with increase metoprolol RVR. Diltiazem was added in last few days of discharge. Diltiazem held due to softer blood pressure.   - Continue metoprolol 200mg PO XL  - Continue to hold Diltiazem  - Continue warfarin with pharmacy to monitor INR and coumadin dosing.          #History of HFrEF   #Pulmonary Edema  #CHF exacerbation  History of coronary artery disease/peripheral  vascular disease per patient on admission. Last Echo 2008 with EF 40-45%, but on 09/24/17 recheck was described as normal. There might be a diastolic component and most suitable be HFpEF. He is on lisinopril, HCTZ, Metoprolol at home. During respiratory decompensation he did have CXR showing pulmonary edema. Which could secondary to HF exacerbation in the setting of atrial fibrillation.  Admission weight ~ 460lbs and dry weight around 415-420 lbs.   - Continue lasix 80mg PO daily   - continue on lisinopril 10 mg and Metoprolol with hold paratmets  - Will need to follow with CHF clinic  Up on discharge      #Hypertension   Episodes of hypotension , SBP -80  Currently on lasix 80 mg + lisinopril 10 mg + Metoprolol 200 mg BID + Diltiazem 30 mg BID Diltiazem was started recently, so it was held. Blood pressure improved after holding Diltiazem  - Continue to monitor and titrate          #DMII  On Metformin at home. HgbA1c 6.5 on 8/14/17.  Resumed back on Metformin  Blood sugars very well controlled.  No need for SSI       #Peripheral Vascular Disease  #Venous Stasis   Reports poor sensation up on admission which is improving now  Possibly have peripheral neuropathy due to Diabetes and chronic venous stasis  Consult lymphedema therapy        #Depression  History of Depression and PTSD with limited social support. Declined therapy and in hospital chaplaincy services. No antidepressants at home. Ongoing concern for recent DNR/DNI code Psychiatry evaluated patient and did not believe depression was factoring on DNI/DNR and possible comfort care decision. Palliative care is involved and at this moment patient is agreeable to continue management and optimization of respiratory status as it is improving, but is still considering discontinuing BiPAP.   - Continue address goals of care with patient  - consult health psychology for ongoing support   - denies  suicidal thoughts      #Lower Extremity Cellulitis -  improving  Bilateral lower extremity with stasis dermatitis and ulcers.  completed oral dose of keflex.  - WOC and lymphedema consulted     #Perisplenic Free Fluid  Noted on CT Chest in the ED 9/24, possible splenic laceration and perisplenic fluid collection following fall. Hgb has remained stable, no increase in abdominal distension, no abdominal pain. Evaluated by general surgery while in hospital. No intervention.         #Hypothyroidism  TSH on admission 3.11, on Synthroid 150 mcg at home  - Continue synthroid 150 mcg      #Hypercapnic Encephalopathy - resolved   - VBG if mental status change  - CO2 goal of ~60s     # Urinary retention with urgency :    could be BPH   on Flomax 0.4 mg, Dose was increased to  0.8 mg     continue to monitor       FEN: CHO consistent   Prophylaxis: DVT warfarin  Indication for psychotropic medications: none                   Ari Reyna MD  Hospitalist ( Internal medicine)  Pager: [phone]

## 2017-10-20 NOTE — PLAN OF CARE
Problem: Goal Outcome Summary  Goal: Goal Outcome Summary  Outcome: Therapy, progress toward functional goals as expected  PTA: Time taken at end of session to review PT POC as pt voiced concerns about being d/c'd from therapy on 10/23 as planned. Pt is close to meeting all PT goals and after going through each goal, pt seemed to be on the same page. Feels comfortable with where his mobility is at as of now.

## 2017-10-20 NOTE — PROGRESS NOTES
"[facility], [place]   Internal Medicine Daily Note           Interval History/Events     Reports doing well  Denies any nausea, vomiting, chest pain  Denies any lightheadedness or dizziness  No fever, chills, nausea, vomiting  Denies any cough, shortness of breath at rest.        Review of Systems        4 point ROS including Respiratory, CV, GI and , other than that noted above is negative      Medications   I have reviewed current medications  in the \"current medication\" section of Epic.  Relevant changes include:     Physical Exam   General:       Vital signs:    Blood pressure 100/64, pulse 89, temperature 97.8  F (36.6  C), temperature source Oral, resp. rate 16, height 1.829 m (6'), weight (!) 177.6 kg (391 lb 8 oz), SpO2 96 %.  Estimated body mass index is 53.1 kg/(m^2) as calculated from the following:    Height as of this encounter: 1.829 m (6').    Weight as of this encounter: 177.6 kg (391 lb 8 oz).      Intake/Output Summary (Last 24 hours) at 10/20/17 0952  Last data filed at 10/20/17 0746   Gross per 24 hour   Intake              240 ml   Output             1700 ml   Net            -1460 ml      HEENT: No icterus, no pallor  Cardiovascular: S1, S2 normal   Respiratory:  B/L CTA  GI/Abdomen: Neurology: Alert, awake, and oriented. No tremors.   Extremities: No pretibial edema  Skin: No rashes.      Laboratory and Imaging Studies     I have reviewed  laboratory and imaging studies in the Epic. Pertinent findings are as below:    BMP    Recent Labs  Lab 10/19/17  0813 10/16/17  0624    139   POTASSIUM 4.2 3.8   CHLORIDE 105 104   CHERI 9.0 8.7   CO2 29 31   BUN 7 15   CR 0.64* 0.64*   * 107*     CBC    Recent Labs  Lab 10/19/17  0813 10/16/17  0624   WBC 5.9 5.4   RBC 5.07 5.22   HGB 15.4 15.9   HCT 48.1 49.2   MCV 95 94   MCH 30.4 30.5   MCHC 32.0 32.3   RDW 14.2 13.9    227     INR    Recent Labs  Lab 10/20/17  0646 10/19/17  0813 10/18/17  0647 " 10/17/17  0730   INR 2.69* 3.00* 2.69* 2.32*     LFTsNo lab results found in last 7 days.   PANCNo lab results found in last 7 days.        Impression/Plan      67 year old male with a history notable for morbid obesity, atrial fibrillation, HFrEF, diabetes, PVD, and untreated depression that was admitted following a fall and CP/SOB on 9/23. Hospital course complicated by  AMS with acute hypoxic/hypercarbic respiratory failure and hypotension requiring initiation of BiPAP and pressors. Transferred to  TCU on 10/09 for ongoing rehabilitation      #Acute on Chronic Hypercapnic Respiratory Failure  #Evidence of Pulmonary Hypertension  #Probable Obesity hypoventilation syndrome  Developed AMS, A fib with RVR to 150-160s and respiratory distress early AM 9/24/17. Sudden decompensated respiratory status could have been secondary to flash pulmonary edema secondary to a-fib with RVR, exacerbation of an obstructive or restrictive lung pathology, infectious causes (although infectious respiratory panel negative) and worsening of obesity hypoventilation syndrome. Improved with BIPAP. Currently on CPAP.  - Continue CPAP at night,  - Goal O2 to keep saturations > 88%  - Continue DuoNebs QID/PRN   - Incentive spirometry   - DC home with  CPAP at the time of leaving TCU      #Atrial fibrillation with RVR - improving  History of A fib in the past, not anticoagulated at home but on metoprolol for rate control. CHADS-VaSC 5. Received  Amiodarone drip during hospitalization. However, his atrial fibrillation improved once respiratory status was stabilized. Rate improved with increase metoprolol, and addition of Diltiazem. Diltiazem was added in last few days of discharge. Diltiazem had to be held due to softer blood pressure.   - Decrease Metoprolol to 100mg PO XL (due to softer BP)  - Continue to hold Diltiazem  - Continue warfarin with pharmacy to monitor INR and coumadin dosing.          #History of HFrEF   #Pulmonary Edema  #CHF  exacerbation  History of coronary artery disease/peripheral vascular disease per patient on admission. Last Echo 2008 with EF 40-45%, but on 09/24/17 recheck was described as normal. There might be a diastolic component and most suitable be HFpEF. He is on lisinopril, HCTZ, Metoprolol at home. During respiratory decompensation he did have CXR showing pulmonary edema. Which could secondary to HF exacerbation in the setting of atrial fibrillation.  Admission weight ~ 460lbs and dry weight around 415-420 lbs.  Weight 391 lbs on 10/19. Furosemide held for couple of days due to softer BP. Patient denies any lightheadedness or dizziness. Lisinopril decreased to 5 on 10/19 due to softer BP.   - Decrease lasix 40mg PO daily   -  Decreased Metoprolol XL to 100 mg with hold paratmets  - Will need to follow with CHF clinic  Up on discharge   - Daily weight     #Hypertension   Episodes of hypotension , SBP -80  Currently on lasix 80 mg + lisinopril 10 mg + Metoprolol 200 mg BID + Diltiazem 30 mg BID Diltiazem was started recently, so it was held. Blood pressure improved after holding Diltiazem. Lisinopril decreased to 5 on 10/19 due to softer BP.   - Decrease lasix 40mg PO daily   -  Decreased Metoprolol XL to 100 mg with hold paratmets  - Continue to monitor and titrate          #DMII  On Metformin at home. HgbA1c 6.5 on 8/14/17.  Resumed back on Metformin  Blood sugars very well controlled.  No need for SSI       #Peripheral Vascular Disease  #Venous Stasis   Reports poor sensation up on admission which is improving now  Possibly have peripheral neuropathy due to Diabetes and chronic venous stasis  Consult lymphedema therapy        #Depression  History of Depression and PTSD with limited social support. Declined therapy and in hospital chaplaincy services. No antidepressants at home. Ongoing concern for recent DNR/DNI code Psychiatry evaluated patient and did not believe depression was factoring on DNI/DNR and possible  comfort care decision. Palliative care is involved and at this moment patient is agreeable to continue management and optimization of respiratory status as it is improving, but is still considering discontinuing BiPAP.   - Continue address goals of care with patient  - consult health psychology for ongoing support   - denies  suicidal thoughts      #Lower Extremity Cellulitis - improving  Bilateral lower extremity with stasis dermatitis and ulcers.  completed oral dose of Cephalexin  - WOC and lymphedema consulted     #Perisplenic Free Fluid  Noted on CT Chest in the ED 9/24, possible splenic laceration and perisplenic fluid collection following fall. Hgb has remained stable, no increase in abdominal distension, no abdominal pain. Evaluated by general surgery while in hospital. No intervention.         #Hypothyroidism  TSH on admission 3.11, on Synthroid 150 mcg at home  - Continue synthroid 150 mcg      #Hypercapnic Encephalopathy - resolved   - VBG if mental status change  - CO2 goal of ~60s     # Urinary retention with urgency :   Possible BPH. He is on  Tamsulosin0.4 mg,  And dose was increased to  0.8 mg    - continue to monitor        FEN: CHO consistent   Prophylaxis: DVT warfarin  Indication for psychotropic medications: none                   Ari Reyna MD  Hospitalist ( Internal medicine)  Pager: [phone]

## 2017-10-20 NOTE — PLAN OF CARE
Problem: Goal Outcome Summary  Goal: Goal Outcome Summary  Outcome: No Change  Patient sleeping without C-pap at the start of shift, he denies SOB but O2 sats was 89% on room air, C-pap on with staff assist and he maintain it through this shift, patient denies pain, using urinal at bedside and is up with stand by assist. Patient awake at 0530 sitting up in chair, bilateral lower legs wraps intact, call light is within reach, continue plan of care

## 2017-10-20 NOTE — PROGRESS NOTES
CLINICAL NUTRITION SERVICES - REASSESSMENT NOTE     Nutrition Prescription    RECOMMENDATIONS FOR MDs/PROVIDERS TO ORDER:  None at this time    Malnutrition Status:    Severe malnutrition in the context of acute illness    Recommendations already ordered by Registered Dietitian (RD):  - Twocal HN or Glucerna PRN  - Oral zinc sulfate - 50 mg/day x 10 days for PI healing  - Vitamin A - up to 25,000/day x 10 days for PI healing  - Vitamin C - 500 mg/day x 10 days for PI healing    Future/Additional Recommendations:  - Monitor weights and intake     EVALUATION OF THE PROGRESS TOWARD GOALS   Diet: Mod carb, 1500 mL Fluid restriction. Gelatein w/ meals.    Intake: Nursing sheets indicate pt is eating % of meals, but he is only ordering 2/day. Per [facility], trays are providing an avg of 1000 kcal and 100g protein. Pt reports having poor appetite is the biggest issue hindering adequate PO intake. He attempts to get 3 meals/day, but it doesn't happen everyday. He is also frustrated with the poor accuracy of trays, and not getting what he ordered. He has made an effort to increase protein at meals and has been eating the Gelatein Plus occasionally with meals.          NEW FINDINGS   Right plantar heel wound d/t Pressure Injury - stage 3 (healing)  Weight: 1.2% wt loss in 1 week, ongoing 5.5% weight loss in 1 month    10/19/17 0841  177.6 kg (391 lb 8 oz) DL     10/12/17 1445  179.7 kg (396 lb 3.2 oz) BB       MALNUTRITION  % Intake: </= 50% for >/= 5 days (severe)  % Weight Loss: > 5% in 1 month (severe)  Subcutaneous Fat Loss: None observed  Muscle Loss: None observed  Fluid Accumulation/Edema: Mild (+2 edema bilaterally)  Malnutrition Diagnosis: Severe malnutrition in the context of acute illness    Previous Goals   Patient to consume 100% of nutritionally adequate meal trays TID, or the equivalent with supplements/snacks + 2 protein supplements between meals.  Evaluation: Not met    Previous Nutrition  Diagnosis  Inadequate protein intake related to decreased appetite and possible attempt to loose wt as evidenced by low trending BUN and calculated avg protein intake of 51g/day, per [facility].  Evaluation: No change, updated below    CURRENT NUTRITION DIAGNOSIS  Inadequate oral intake related to decreased appetite as evidenced by  >5% weight loss in 1 month (ongoing) and avg intake </=50% of estimated needs x 5 days.      INTERVENTIONS  Implementation  Medical food supplement therapy - Encouraged pt to drink a supplement between meals (TwoCal HN or Glucerna)  Nutrition education for nutrition relationship to health/disease - Encouraged pt to get 3 meals/day and to order higher calorie/protein foods.  Placed a supervisor check on meal tickets to help with tray accuracy.     Goals  Patient to consume 100% of nutritionally adequate meal trays TID, or the equivalent with supplements/snacks + 2 protein supplements between meals.    Monitoring/Evaluation  Progress toward goals will be monitored and evaluated per protocol.    Shasta Sterling, Dietetic Intern  [facility]

## 2017-10-20 NOTE — PLAN OF CARE
Pt is alert an orientedx4. Pleasant and cooperative. A-1 person for ADLs. Abdomen soft and non tender. Passing gas. Appetite good. Denies any chest pain or SOB upon assessment. Continued on PT/OT.    BP at around 0745 was 96/50mhg. BP rechecked 15 mins after was 100/64. Held 8am dose of Lasix, Lisinopril and Metoprolol. Health care provider informed. Made new order for BP meds. PICC at R brachial vein patent, dressing CDI, flushing well. FR: 1500ml.  Latest BP: 111/71 mmhg.   Vital signs:  Temp: 97.8  F (36.6  C) Temp src: Oral BP: 111/71 Pulse: 89   Resp: 16 SpO2: 96 % O2 Device: None (Room air)

## 2017-10-20 NOTE — PLAN OF CARE
Problem: Goal Outcome Summary  Goal: Goal Outcome Summary  Outcome: No Change  Pt is alert and able to communicate needs. Bilateral legs washed with soap and water and lotion applied per orders. Both dressings to legs changed per orders and edema wraps rewrapped. Pt denies pain and SOB. No new concerns at this time.

## 2017-10-20 NOTE — PLAN OF CARE
Problem: Goal Outcome Summary  Goal: Goal Outcome Summary  Outcome: Therapy, progress toward functional goals as expected  OT - Home distance mobility training using 2WW and SBA for 60 ft, tub/shower transfer with simulated home setup and trialing wall bracing technique using 2WW and min assist for LE clearance over tub.

## 2017-10-21 LAB
GLUCOSE BLDC GLUCOMTR-MCNC: 101 MG/DL (ref 70–99)
GLUCOSE BLDC GLUCOMTR-MCNC: 96 MG/DL (ref 70–99)
INR PPP: 2.96 (ref 0.86–1.14)

## 2017-10-21 PROCEDURE — 36415 COLL VENOUS BLD VENIPUNCTURE: CPT | Performed by: INTERNAL MEDICINE

## 2017-10-21 PROCEDURE — 25000132 ZZH RX MED GY IP 250 OP 250 PS 637: Performed by: INTERNAL MEDICINE

## 2017-10-21 PROCEDURE — 85610 PROTHROMBIN TIME: CPT | Performed by: INTERNAL MEDICINE

## 2017-10-21 PROCEDURE — 00000146 ZZHCL STATISTIC GLUCOSE BY METER IP

## 2017-10-21 PROCEDURE — 25000128 H RX IP 250 OP 636: Performed by: INTERNAL MEDICINE

## 2017-10-21 PROCEDURE — 97116 GAIT TRAINING THERAPY: CPT | Mod: GP

## 2017-10-21 PROCEDURE — 97535 SELF CARE MNGMENT TRAINING: CPT | Mod: GO | Performed by: OCCUPATIONAL THERAPIST

## 2017-10-21 PROCEDURE — 12000022 ZZH R&B SNF

## 2017-10-21 PROCEDURE — 40000133 ZZH STATISTIC OT WARD VISIT: Performed by: OCCUPATIONAL THERAPIST

## 2017-10-21 PROCEDURE — 97110 THERAPEUTIC EXERCISES: CPT | Mod: GP

## 2017-10-21 PROCEDURE — 40000193 ZZH STATISTIC PT WARD VISIT

## 2017-10-21 RX ORDER — WARFARIN SODIUM 5 MG/1
10 TABLET ORAL
Status: COMPLETED | OUTPATIENT
Start: 2017-10-21 | End: 2017-10-21

## 2017-10-21 RX ADMIN — OXYCODONE HYDROCHLORIDE AND ACETAMINOPHEN 500 MG: 500 TABLET ORAL at 07:53

## 2017-10-21 RX ADMIN — Medication 1 G: at 07:52

## 2017-10-21 RX ADMIN — METFORMIN HYDROCHLORIDE 500 MG: 500 TABLET ORAL at 07:52

## 2017-10-21 RX ADMIN — SODIUM CHLORIDE, PRESERVATIVE FREE 5 ML: 5 INJECTION INTRAVENOUS at 06:00

## 2017-10-21 RX ADMIN — ASPIRIN 81 MG CHEWABLE TABLET 81 MG: 81 TABLET CHEWABLE at 07:52

## 2017-10-21 RX ADMIN — LEVOTHYROXINE SODIUM 150 MCG: 100 TABLET ORAL at 06:13

## 2017-10-21 RX ADMIN — TAMSULOSIN HYDROCHLORIDE 0.8 MG: 0.4 CAPSULE ORAL at 07:53

## 2017-10-21 RX ADMIN — ATORVASTATIN CALCIUM 40 MG: 40 TABLET, FILM COATED ORAL at 07:52

## 2017-10-21 RX ADMIN — MULTIPLE VITAMINS W/ MINERALS TAB 1 TABLET: TAB at 07:52

## 2017-10-21 RX ADMIN — WARFARIN SODIUM 10 MG: 5 TABLET ORAL at 18:03

## 2017-10-21 RX ADMIN — POTASSIUM CHLORIDE 40 MEQ: 1500 TABLET, EXTENDED RELEASE ORAL at 20:03

## 2017-10-21 RX ADMIN — METFORMIN HYDROCHLORIDE 500 MG: 500 TABLET ORAL at 18:03

## 2017-10-21 RX ADMIN — LISINOPRIL 5 MG: 5 TABLET ORAL at 09:52

## 2017-10-21 RX ADMIN — POTASSIUM CHLORIDE 40 MEQ: 1500 TABLET, EXTENDED RELEASE ORAL at 07:52

## 2017-10-21 RX ADMIN — ZINC SULFATE CAP 220 MG (50 MG ELEMENTAL ZN) 220 MG: 220 (50 ZN) CAP at 07:52

## 2017-10-21 RX ADMIN — ALTEPLASE 2 MG: 2.2 INJECTION, POWDER, LYOPHILIZED, FOR SOLUTION INTRAVENOUS at 12:02

## 2017-10-21 RX ADMIN — Medication 20000 UNITS: at 07:53

## 2017-10-21 RX ADMIN — SENNOSIDES 2 TABLET: 8.6 TABLET, FILM COATED ORAL at 20:04

## 2017-10-21 RX ADMIN — METOPROLOL SUCCINATE 100 MG: 100 TABLET, EXTENDED RELEASE ORAL at 07:53

## 2017-10-21 RX ADMIN — FUROSEMIDE 40 MG: 40 TABLET ORAL at 09:52

## 2017-10-21 RX ADMIN — ACETAMINOPHEN 650 MG: 325 TABLET, FILM COATED ORAL at 06:19

## 2017-10-21 NOTE — PLAN OF CARE
Problem: Goal Outcome Summary  Goal: Goal Outcome Summary  OT patient on target for anticipated OT DC. Patient ed and discussion regarding OT support in home care setting.

## 2017-10-21 NOTE — PLAN OF CARE
"Problem: Goal Outcome Summary  Goal: Goal Outcome Summary  Outcome: No Change  Pt.A&Ox4. Initial rounds pt.awake and watching TV. Denies pain or discomfort. No c/o's CP/SOB. BLE lymphedema wraps intact. Using cpap through the night. Continent using urinal indep.- staff empties.    0630 - No blood return from picc (x3 lumens). Sticky noted MD for Alteplase. Pt.is a daily lab draw so prefers not to have venipuncture daily. Pt.requested and rec'd Tylenol 650mg po for c/o slight HA and (R) shoulder soreness \"from therapy\". Heat pack applied.  "

## 2017-10-21 NOTE — PLAN OF CARE
Problem: Goal Outcome Summary  Goal: Goal Outcome Summary  Pt alert and oriented. Transfers with assist of 1, stand by assist and walker. Wearing CPAP at night. SOB with activity. Continent of bowel/bladder. Last BM 10/19. BLE wraps removed, dressings changed and reapplied per order. C/o some pain to right shoulder, declined any interventions at this time. BG at supper 88. Pt denied any new concerns.

## 2017-10-21 NOTE — PLAN OF CARE
Patient alert and oriented. Denies any complaints of pain. SBP this AM <110. MD notified and new parameters initiated. Continue to give all BP medications if SBP>105. If SBP continues to be low, encourage fluids and re-assess in 1 hour as per orders. Continues on 1500mL fluid restriction. Appetite good. Lymphedema wraps C/D/I. Wound cares done yesterday- due every other day. Requires A1 for all ADLs, transfers, mobility and toileting needs. Able to make needs known. Pleasant and cooperative. Able to make needs known. Triple lumen PICC noted to RUE. Flushing well, but no blood return. MD updated to get TPA orders. Will continue to observe.     Temp: 96.9  F (36.1  C) Temp src: Oral BP: 106/68 Pulse: 67   Resp: 18 SpO2: 94 % O2 Device: None (Room air)      Addendum 1230: Administrated TPA to red port of triple lumen at 1200. No blood return at 1230. Will have TPA dwell another 90 minutes until 1400. Will re-assess blood return at that time. Will monitor.     Addendum 1400: Blood return noted to red port. Site heparinized.   Patient requesting for toenails trimmed. Bilateral toenails trimmed as able. Podiatry consulted.

## 2017-10-21 NOTE — PLAN OF CARE
Problem: Goal Outcome Summary  Goal: Goal Outcome Summary  Outcome: Therapy, progress toward functional goals as expected  PTA: Pt on track for meeting PT goals by 10/23.

## 2017-10-22 LAB
GLUCOSE BLDC GLUCOMTR-MCNC: 134 MG/DL (ref 70–99)
GLUCOSE BLDC GLUCOMTR-MCNC: 136 MG/DL (ref 70–99)
GLUCOSE BLDC GLUCOMTR-MCNC: 95 MG/DL (ref 70–99)
INR PPP: 2.85 (ref 0.86–1.14)

## 2017-10-22 PROCEDURE — 97535 SELF CARE MNGMENT TRAINING: CPT | Mod: GO | Performed by: OCCUPATIONAL THERAPIST

## 2017-10-22 PROCEDURE — 40000133 ZZH STATISTIC OT WARD VISIT: Performed by: OCCUPATIONAL THERAPIST

## 2017-10-22 PROCEDURE — 25000132 ZZH RX MED GY IP 250 OP 250 PS 637: Performed by: INTERNAL MEDICINE

## 2017-10-22 PROCEDURE — 85610 PROTHROMBIN TIME: CPT | Performed by: INTERNAL MEDICINE

## 2017-10-22 PROCEDURE — 97530 THERAPEUTIC ACTIVITIES: CPT | Mod: GO | Performed by: OCCUPATIONAL THERAPIST

## 2017-10-22 PROCEDURE — 36592 COLLECT BLOOD FROM PICC: CPT | Performed by: INTERNAL MEDICINE

## 2017-10-22 PROCEDURE — 97116 GAIT TRAINING THERAPY: CPT | Mod: GP | Performed by: PHYSICAL THERAPIST

## 2017-10-22 PROCEDURE — 40000193 ZZH STATISTIC PT WARD VISIT: Performed by: PHYSICAL THERAPIST

## 2017-10-22 PROCEDURE — 00000146 ZZHCL STATISTIC GLUCOSE BY METER IP

## 2017-10-22 PROCEDURE — 12000022 ZZH R&B SNF

## 2017-10-22 PROCEDURE — 97530 THERAPEUTIC ACTIVITIES: CPT | Mod: GP | Performed by: PHYSICAL THERAPIST

## 2017-10-22 PROCEDURE — 25000128 H RX IP 250 OP 636: Performed by: INTERNAL MEDICINE

## 2017-10-22 RX ORDER — WARFARIN SODIUM 5 MG/1
10 TABLET ORAL
Status: COMPLETED | OUTPATIENT
Start: 2017-10-22 | End: 2017-10-22

## 2017-10-22 RX ADMIN — MULTIPLE VITAMINS W/ MINERALS TAB 1 TABLET: TAB at 08:57

## 2017-10-22 RX ADMIN — ZINC SULFATE CAP 220 MG (50 MG ELEMENTAL ZN) 220 MG: 220 (50 ZN) CAP at 08:57

## 2017-10-22 RX ADMIN — LISINOPRIL 5 MG: 5 TABLET ORAL at 08:59

## 2017-10-22 RX ADMIN — SODIUM CHLORIDE, PRESERVATIVE FREE 5 ML: 5 INJECTION INTRAVENOUS at 05:46

## 2017-10-22 RX ADMIN — ACETAMINOPHEN 650 MG: 325 TABLET, FILM COATED ORAL at 06:07

## 2017-10-22 RX ADMIN — Medication 1 G: at 08:56

## 2017-10-22 RX ADMIN — WARFARIN SODIUM 10 MG: 5 TABLET ORAL at 18:01

## 2017-10-22 RX ADMIN — OXYCODONE HYDROCHLORIDE AND ACETAMINOPHEN 500 MG: 500 TABLET ORAL at 08:56

## 2017-10-22 RX ADMIN — SENNOSIDES 2 TABLET: 8.6 TABLET, FILM COATED ORAL at 19:31

## 2017-10-22 RX ADMIN — FUROSEMIDE 40 MG: 40 TABLET ORAL at 08:59

## 2017-10-22 RX ADMIN — POTASSIUM CHLORIDE 40 MEQ: 1500 TABLET, EXTENDED RELEASE ORAL at 08:56

## 2017-10-22 RX ADMIN — LEVOTHYROXINE SODIUM 150 MCG: 100 TABLET ORAL at 06:07

## 2017-10-22 RX ADMIN — ASPIRIN 81 MG CHEWABLE TABLET 81 MG: 81 TABLET CHEWABLE at 08:55

## 2017-10-22 RX ADMIN — Medication 20000 UNITS: at 08:56

## 2017-10-22 RX ADMIN — ATORVASTATIN CALCIUM 40 MG: 40 TABLET, FILM COATED ORAL at 08:57

## 2017-10-22 RX ADMIN — METOPROLOL SUCCINATE 100 MG: 100 TABLET, EXTENDED RELEASE ORAL at 08:56

## 2017-10-22 RX ADMIN — TAMSULOSIN HYDROCHLORIDE 0.8 MG: 0.4 CAPSULE ORAL at 08:56

## 2017-10-22 RX ADMIN — POTASSIUM CHLORIDE 40 MEQ: 1500 TABLET, EXTENDED RELEASE ORAL at 19:31

## 2017-10-22 RX ADMIN — METFORMIN HYDROCHLORIDE 500 MG: 500 TABLET ORAL at 08:57

## 2017-10-22 RX ADMIN — SODIUM CHLORIDE, PRESERVATIVE FREE 10 ML: 5 INJECTION INTRAVENOUS at 06:04

## 2017-10-22 RX ADMIN — METFORMIN HYDROCHLORIDE 500 MG: 500 TABLET ORAL at 18:01

## 2017-10-22 NOTE — PLAN OF CARE
"Problem: Goal Outcome Summary  Goal: Goal Outcome Summary  Outcome: Therapy, progress toward functional goals as expected  OT Patient appears anxious about returning to home \"where no changes have been made - I feel like I'll fall into the same pattern\" Educated on how to make some changes and about home therapies/SN coming into home. Patient may require some AE/AD for home - he will let therapies know tomorrow what his needs are. Patient is now Mod I in room for bathroom needs with use of walker      "

## 2017-10-22 NOTE — PLAN OF CARE
"Problem: Goal Outcome Summary  Goal: Goal Outcome Summary  Outcome: No Change  Pt.A&Ox4. Appears to be sleeping well. Cpap in use for most of the night, requests \"a break\" from it occasionally, still getting use to it. Denies pain or discomfort. No c/o's CP/SOB. BLE lymphedema wraps intact. Uses urinal indep.- staff empties.       "

## 2017-10-22 NOTE — PLAN OF CARE
Patient alert and oriented. Denies any complaints of pain. SBP this AM <110. Re-assessed after breakfast with improvement. Continues on 1500mL fluid restriction. Appetite good. Lymphedema wraps C/D/I. Wound cares due tonight. Requires A1 for all ADLs, transfers, mobility and toileting needs. Able to make needs known. Pleasant and cooperative. Able to make needs known.  Will continue to observe.     Temp: 96.6  F (35.9  C) Temp src: Oral BP: 113/77 Pulse: 83 Heart Rate: 108 Resp: 18 SpO2: 96 % O2 Device: None (Room air)

## 2017-10-22 NOTE — PLAN OF CARE
Problem: Goal Outcome Summary  Goal: Goal Outcome Summary  Pt alert and oriented x4. Transfers with assist of 1 and walker. Continent of bowel/bladder. Last BM 10/19, sennakot given at bedtime per patient request. Denies pain. Bilateral LE wraps removed, cares done and reapplied. BG 96 at supper. Wears cpap at night. Denies any new concerns.

## 2017-10-22 NOTE — PLAN OF CARE
Problem: Goal Outcome Summary  Goal: Goal Outcome Summary  PT: based on performance this morning, pt is safe to ambulate within room without assistance. Pt requires SV assistance for longer ambulation outside of room. All transfers and bed mobility completed with good safety awareness. Pt is very afraid of getting home and becoming sedentary again. Recommend helping pt create strategies that will help him remember to move, exercise and walk frequently throughout the day.

## 2017-10-23 LAB
ANION GAP SERPL CALCULATED.3IONS-SCNC: 6 MMOL/L (ref 3–14)
BUN SERPL-MCNC: 9 MG/DL (ref 7–30)
CALCIUM SERPL-MCNC: 8.8 MG/DL (ref 8.5–10.1)
CHLORIDE SERPL-SCNC: 104 MMOL/L (ref 94–109)
CO2 SERPL-SCNC: 29 MMOL/L (ref 20–32)
CREAT SERPL-MCNC: 0.69 MG/DL (ref 0.66–1.25)
ERYTHROCYTE [DISTWIDTH] IN BLOOD BY AUTOMATED COUNT: 14.2 % (ref 10–15)
GFR SERPL CREATININE-BSD FRML MDRD: >90 ML/MIN/1.7M2
GLUCOSE BLDC GLUCOMTR-MCNC: 116 MG/DL (ref 70–99)
GLUCOSE BLDC GLUCOMTR-MCNC: 85 MG/DL (ref 70–99)
GLUCOSE SERPL-MCNC: 106 MG/DL (ref 70–99)
HCT VFR BLD AUTO: 49.2 % (ref 40–53)
HGB BLD-MCNC: 15.7 G/DL (ref 13.3–17.7)
INR PPP: 2.48 (ref 0.86–1.14)
MAGNESIUM SERPL-MCNC: 1.9 MG/DL (ref 1.6–2.3)
MCH RBC QN AUTO: 29.8 PG (ref 26.5–33)
MCHC RBC AUTO-ENTMCNC: 31.9 G/DL (ref 31.5–36.5)
MCV RBC AUTO: 93 FL (ref 78–100)
PLATELET # BLD AUTO: 182 10E9/L (ref 150–450)
POTASSIUM SERPL-SCNC: 4.2 MMOL/L (ref 3.4–5.3)
RBC # BLD AUTO: 5.27 10E12/L (ref 4.4–5.9)
SODIUM SERPL-SCNC: 139 MMOL/L (ref 133–144)
WBC # BLD AUTO: 5.5 10E9/L (ref 4–11)

## 2017-10-23 PROCEDURE — 36592 COLLECT BLOOD FROM PICC: CPT | Performed by: INTERNAL MEDICINE

## 2017-10-23 PROCEDURE — 25000132 ZZH RX MED GY IP 250 OP 250 PS 637: Performed by: INTERNAL MEDICINE

## 2017-10-23 PROCEDURE — 97535 SELF CARE MNGMENT TRAINING: CPT | Mod: GO | Performed by: OCCUPATIONAL THERAPIST

## 2017-10-23 PROCEDURE — 85610 PROTHROMBIN TIME: CPT | Performed by: INTERNAL MEDICINE

## 2017-10-23 PROCEDURE — 12000022 ZZH R&B SNF

## 2017-10-23 PROCEDURE — 97116 GAIT TRAINING THERAPY: CPT | Mod: GP

## 2017-10-23 PROCEDURE — 80048 BASIC METABOLIC PNL TOTAL CA: CPT | Performed by: INTERNAL MEDICINE

## 2017-10-23 PROCEDURE — 97530 THERAPEUTIC ACTIVITIES: CPT | Mod: GP

## 2017-10-23 PROCEDURE — 83735 ASSAY OF MAGNESIUM: CPT | Performed by: INTERNAL MEDICINE

## 2017-10-23 PROCEDURE — 97110 THERAPEUTIC EXERCISES: CPT | Mod: GP

## 2017-10-23 PROCEDURE — 85027 COMPLETE CBC AUTOMATED: CPT | Performed by: INTERNAL MEDICINE

## 2017-10-23 PROCEDURE — 25000128 H RX IP 250 OP 636: Performed by: INTERNAL MEDICINE

## 2017-10-23 PROCEDURE — 00000146 ZZHCL STATISTIC GLUCOSE BY METER IP

## 2017-10-23 PROCEDURE — 40000193 ZZH STATISTIC PT WARD VISIT

## 2017-10-23 PROCEDURE — 40000133 ZZH STATISTIC OT WARD VISIT: Performed by: OCCUPATIONAL THERAPIST

## 2017-10-23 RX ORDER — MULTIPLE VITAMINS W/ MINERALS TAB 9MG-400MCG
1 TAB ORAL DAILY
Qty: 30 EACH | Refills: 0 | Status: SHIPPED | OUTPATIENT
Start: 2017-10-23 | End: 2017-11-10

## 2017-10-23 RX ORDER — ZINC SULFATE 50(220)MG
220 CAPSULE ORAL DAILY
Qty: 30 CAPSULE | Refills: 0 | Status: SHIPPED | OUTPATIENT
Start: 2017-10-23 | End: 2017-11-10

## 2017-10-23 RX ORDER — ASCORBIC ACID 500 MG
500 TABLET ORAL DAILY
Qty: 8 TABLET | Refills: 0 | Status: SHIPPED | OUTPATIENT
Start: 2017-10-23 | End: 2017-10-31

## 2017-10-23 RX ORDER — TAMSULOSIN HYDROCHLORIDE 0.4 MG/1
0.4 CAPSULE ORAL DAILY
Status: DISCONTINUED | OUTPATIENT
Start: 2017-10-24 | End: 2017-10-27 | Stop reason: HOSPADM

## 2017-10-23 RX ORDER — OMEGA-3 FATTY ACIDS/FISH OIL 300-1000MG
1 CAPSULE ORAL DAILY
Qty: 30 CAPSULE | Refills: 0 | Status: SHIPPED | OUTPATIENT
Start: 2017-10-23 | End: 2017-11-10

## 2017-10-23 RX ORDER — POTASSIUM CHLORIDE 1500 MG/1
40 TABLET, EXTENDED RELEASE ORAL DAILY
Qty: 30 TABLET | Refills: 0 | Status: SHIPPED | OUTPATIENT
Start: 2017-10-23 | End: 2017-10-27

## 2017-10-23 RX ORDER — LEVOTHYROXINE SODIUM 150 UG/1
150 TABLET ORAL DAILY
Qty: 30 TABLET | Refills: 0 | Status: SHIPPED | OUTPATIENT
Start: 2017-10-23 | End: 2017-11-10

## 2017-10-23 RX ORDER — ZINC SULFATE 50(220)MG
220 CAPSULE ORAL DAILY
Qty: 30 CAPSULE | Refills: 0 | Status: SHIPPED | OUTPATIENT
Start: 2017-10-23 | End: 2017-10-23

## 2017-10-23 RX ORDER — IPRATROPIUM BROMIDE AND ALBUTEROL SULFATE 2.5; .5 MG/3ML; MG/3ML
3 SOLUTION RESPIRATORY (INHALATION) EVERY 4 HOURS PRN
Qty: 360 ML | Refills: 3 | Status: SHIPPED | OUTPATIENT
Start: 2017-10-23 | End: 2018-01-15

## 2017-10-23 RX ORDER — ATORVASTATIN CALCIUM 40 MG/1
40 TABLET, FILM COATED ORAL DAILY
Qty: 30 TABLET | Refills: 0 | Status: SHIPPED | OUTPATIENT
Start: 2017-10-23 | End: 2018-01-15

## 2017-10-23 RX ADMIN — TAMSULOSIN HYDROCHLORIDE 0.8 MG: 0.4 CAPSULE ORAL at 08:35

## 2017-10-23 RX ADMIN — ATORVASTATIN CALCIUM 40 MG: 40 TABLET, FILM COATED ORAL at 08:42

## 2017-10-23 RX ADMIN — SODIUM CHLORIDE, PRESERVATIVE FREE 5 ML: 5 INJECTION INTRAVENOUS at 07:03

## 2017-10-23 RX ADMIN — Medication 20000 UNITS: at 08:35

## 2017-10-23 RX ADMIN — OXYCODONE HYDROCHLORIDE AND ACETAMINOPHEN 500 MG: 500 TABLET ORAL at 08:42

## 2017-10-23 RX ADMIN — POTASSIUM CHLORIDE 40 MEQ: 1500 TABLET, EXTENDED RELEASE ORAL at 22:01

## 2017-10-23 RX ADMIN — FUROSEMIDE 40 MG: 40 TABLET ORAL at 08:37

## 2017-10-23 RX ADMIN — WARFARIN SODIUM 12.5 MG: 7.5 TABLET ORAL at 17:35

## 2017-10-23 RX ADMIN — METFORMIN HYDROCHLORIDE 500 MG: 500 TABLET ORAL at 17:35

## 2017-10-23 RX ADMIN — Medication 1 G: at 08:36

## 2017-10-23 RX ADMIN — LEVOTHYROXINE SODIUM 150 MCG: 100 TABLET ORAL at 05:56

## 2017-10-23 RX ADMIN — MULTIPLE VITAMINS W/ MINERALS TAB 1 TABLET: TAB at 08:36

## 2017-10-23 RX ADMIN — POTASSIUM CHLORIDE 40 MEQ: 1500 TABLET, EXTENDED RELEASE ORAL at 08:36

## 2017-10-23 RX ADMIN — SODIUM CHLORIDE, PRESERVATIVE FREE 10 ML: 5 INJECTION INTRAVENOUS at 05:58

## 2017-10-23 RX ADMIN — LISINOPRIL 5 MG: 5 TABLET ORAL at 08:42

## 2017-10-23 RX ADMIN — ASPIRIN 81 MG CHEWABLE TABLET 81 MG: 81 TABLET CHEWABLE at 08:35

## 2017-10-23 RX ADMIN — METFORMIN HYDROCHLORIDE 500 MG: 500 TABLET ORAL at 08:42

## 2017-10-23 RX ADMIN — METOPROLOL SUCCINATE 100 MG: 100 TABLET, EXTENDED RELEASE ORAL at 08:42

## 2017-10-23 RX ADMIN — ZINC SULFATE CAP 220 MG (50 MG ELEMENTAL ZN) 220 MG: 220 (50 ZN) CAP at 08:35

## 2017-10-23 NOTE — PLAN OF CARE
Problem: Goal Outcome Summary  Goal: Goal Outcome Summary  Outcome: No Change  No new issues or concerns. Sleeping off and on with cpap in use. Denies pain or discomfort. No c/o's CP/SOB. Uses urinal indep.- staff empties. BLE lymphedema wraps intact. Ancelmo in room with walker.

## 2017-10-23 NOTE — CONSULTS
Health Psychology                  Clinic    Department of Medicine  Stephanie Gray, Ph.D., L.P. [phone]                          Clinics and [facility] Center  [facility] Tatum Hameed, Ph.D.,  L.P. [phone]                 3rd Floor  [place] [address]   Erick Gomez, Ph.D., DARBY., L.P. [phone]     [address] [place] [address] Sybil Guevara, Ph.D., L.P. [phone]            [place] [address]           Carolyn Madrid, Ph.D., L.P. [phone]     Inpatient Health Psychology Consultation*    DATE OF SERVICE:  10/23/2017     Clinical Information:  Met with Mr Moreira to check in on his progress and emotional status and provide information pertinent to discharge.  Possibility of MNET to allow continuity of care at [facility], where he has been a patient for many years. Very pleased that he may qualify for this option. Also pleased to hear that there are mental health services available at [facility].  Discussed a number of his concerns. He finds it difficult to accept positive feedback about himself and his level of insight and abilities without some self-denigration. I gave him examples of having higher levels of social skills and ability to make close emotional connection based on observation of his last week with two friends. He initially misunderstood who I was referring to, when he did make the correct connection he was able to acknowledge that in those cases the positive feedback was appropriate. Talked through several of his other concerns. Brought his focus back to the tools that he has talked about and exhibited that he can use with me in our interactions, as well as a reported difficult interaction surrounding his discharge that he also told me about last week. Again, it was initially hard for him to see that he had done a good job in that interaction, eventually was able to acknowledge that it may have been  an example of his ability to navigate situations in a better manner than he always fears he will exhibit.  Assessment: Anxiety about discharge high, and will benefit from a great deal of support around all discharge related issues.  Diagnosis:    1.  Major depressive disorder, recurrent, moderate to severe (F33.1).   2.  Anxiety disorder, not otherwise specified (F41.9).   Recommendations/Plan: Provide Mr Moreira with information about registering for MNET services, about the [facility] and [facility]. He was happy to get the information, noted that his first choice would definitely be to remain a patient at hospitals and explore mental health services available there.  Time Spent with Patient: 55 minutes  Service Provided: Individual psychotherapy   Provider: Stephanie Gray, Ph.D,    Provider Pager: [phone]   Provider Phone: [phone]

## 2017-10-23 NOTE — PROGRESS NOTES
Social Work Services Progress Note    Hospital Day: 16  Date of Initial Social Work Evaluation:  10/13/2017  Collaborated with:  Pt, Landenjc Arias ([phone]), Janice JACOBO, Jacob GERONIMO CC    Data:  Clarke is a 67 year old male, admitted to the TCU on 10/8/2017    Intervention:  Equipment update     Assessment:      Per Landen Arias  with the pt's apartment ([employer])  she stated that they were going to be able to get the pt his PT/OT equipment and stated that after checking with her supervisor that getting that equipment would take weeks and that we need to get the equipment for him. Also Landen stated that with the [facility] referral that, this would also take weeks to process through. In prior conversation this morning with the pt and Jacob GERONIMO CC the pt indicates that the chair he has is not safe and that if he gets in the chair he will fall if he gets out of it. Writer told this to Landen and she stated that she was going to look into getting the pt a recliner by tomorrow but that she would call the writer back before 3 pm. Writer talked with Janice from OT indicating this updated news and the team is able to get the pt his equipment except for the bariatric tub bench. Writer will indicate this to Landen when she calls back this afternoon.     Plan:    Anticipated Disposition:  Home with services    Barriers to d/c plan:  Medical clearance, safe DC plan.     Follow Up:  SW will continue to follow and assist as needed.    YAAKOV Carlin  TCU   P: [phone]  Pgr: [phone]

## 2017-10-23 NOTE — PLAN OF CARE
Problem: Goal Outcome Summary  Goal: Goal Outcome Summary  Outcome: No Change  Patient is alert x 4 and very talkative. Patient's VSS. Patient complaining of some right shoulder pain but didn't want anything for the pain. Patient is planning discharging tomorrow at 1310. Patient is aware of his fluid restriction. No Mg or K+ replacement needed today. Wraps on bilateral lower extremities.  this am.

## 2017-10-23 NOTE — PLAN OF CARE
Problem: Goal Outcome Summary  Goal: Goal Outcome Summary  Outcome: Therapy, progress toward functional goals as expected  OT - Last session, pt completed meal prep task using walker safety techniques provided through therapist education and training, demonstrating increased confidence during entire session.

## 2017-10-23 NOTE — PLAN OF CARE
Problem: Goal Outcome Summary  Goal: Goal Outcome Summary  Occupational Therapy Discharge Summary     Reason for therapy discharge:    All goals and outcomes met, no further needs identified.     Progress towards therapy goal(s). See goals on Care Plan in [facility] electronic health record for goal details.  Goals met     Therapy recommendation(s):    Continued therapy is recommended.  Rationale/Recommendations:  Recommend home OT/HHA.  Continue home exercise program. Pt discharging to home with support of home PT, OT, HHA/SN. Pt receiving leg , reacher, bariatric wheeled walker, bariatric transfer tub bench with SW set up. Pt set up with UE HEP theraband that can be completed independently.

## 2017-10-23 NOTE — PLAN OF CARE
Problem: Goal Outcome Summary  Goal: Goal Outcome Summary  Physical Therapy Discharge Summary     Reason for therapy discharge:    All goals and outcomes met, no further needs identified.     Progress towards therapy goal(s). See goals on Care Plan in [facility] electronic health record for goal details.  Goals met     Therapy recommendation(s):    No further therapy is recommended. Pt has met all PT goals for TCU, and is now safe to return to home. He is using a WW for ambulation, and wearing offloading shoes for skin protection on feet. He has been given HEP to continue once home, and is eager to continue with therapies. HCPT recommended for home safety assessment and continued balance, strength, and gait training.

## 2017-10-23 NOTE — PLAN OF CARE
Problem: OT General Care Plan  Goal: OT Goal 2  OT Goal 2   Outcome: Change based on patient need/priority Date Met:  10/23/17

## 2017-10-23 NOTE — PROGRESS NOTES
Met with patient to update the DC planning discussion. If patient remains medically stable he might DC to home tomorrow 10/24/2017. His BP and symptoms of orthostatic hypotension are being monitored closely. Home care SN/PT/OT to be provided by  Home Care, updated  Home Care Liaison. Vielka from  Home Medical is here now setting up and teaching patient on a home CPAP machine. [facility] currently set to pick patient up at 1:10pm on 10/24 to transport home.

## 2017-10-23 NOTE — DISCHARGE SUMMARY
Discharge Summary    Clarke Moreira MRN# [id number]   YOB: 1950 Age: 67 year old     Date of Admission:  10/8/2017  Date of Discharge:  10/23/2017  Admitting Physician:  Mathew Olson MD  Discharge Physician:  Mathew Olson MD   Discharging Service:  Internal Medicine     Primary Provider: Matthias Sanchez          Discharge Diagnosis:     Physical Deconditioning  Morbid Obesity  Atrial Fibrillation  HFrEF  DM  PVD  LE edema.   Orthostatic hypotension.   Depression.               Brief History of Illness:     Clarke Moreira is a 67 year old male who was admitted for rehabilitation    For more details, please refer to the admission H&P on 10/8/2017          TCU Course:     67 year old male with a history notable for morbid obesity, atrial fibrillation, HFrEF, diabetes, PVD, and untreated depression that was admitted following a fall and CP/SOB on 9/23. Hospital course complicated by  AMS with acute hypoxic/hypercarbic respiratory failure and hypotension requiring initiation of BiPAP and pressors. He was transferred to  TCU on 10/09 for ongoing rehabilitation.   His TCU course was uncomplicated. He was evaluated and treated by PT/OT. He has completed his therapy. He will be discharged with home care services.     Individual problems and management are as outlined below:      #Acute on Chronic Hypercapnic Respiratory Failure  #Evidence of Pulmonary Hypertension  #Probable Obesity hypoventilation syndrome  Developed AMS, A fib with RVR to 150-160s and respiratory distress early AM 9/24/17. Sudden decompensated respiratory status could have been secondary to flash pulmonary edema secondary to a-fib with RVR, exacerbation of an obstructive or restrictive lung pathology, infectious causes (although infectious respiratory panel negative) and worsening of obesity hypoventilation syndrome. Improved with BIPAP. Currently on BPAP S/T  - Continue BPAP at night  - Continue DuoNebs PRN   - Incentive  spirometry   - DC home with BPAP  - Out patient Sleep study      #Atrial fibrillation with RVR - improving  History of A fib in the past, not anticoagulated at home but on metoprolol for rate control. CHADS-VaSC 5. Received  Amiodarone drip during hospitalization. However, his atrial fibrillation improved once respiratory status was stabilized. Rate improved with increase metoprolol, and addition of Diltiazem. Diltiazem was added in last few days of discharge. Diltiazem had to be held due to softer blood pressure. Also    Metoprolol had to be decreased to 100mg PO XL due to softer BP  INR 2.48 on 10/23  - Continue Metoprolol  - Continue warfarin with pharmacy to monitor INR and coumadin dosing.          #History of HFrEF   #Pulmonary Edema  #CHF exacerbation  History of coronary artery disease/peripheral vascular disease per patient on admission. Last Echo 2008 with EF 40-45%, but on 09/24/17 recheck was described as normal. There might be a diastolic component and most suitable be HFpEF. He is on lisinopril, HCTZ, Metoprolol at home. During respiratory decompensation he did have CXR showing pulmonary edema. Which could secondary to HF exacerbation in the setting of atrial fibrillation.  Admission weight ~ 460lbs and dry weight around 415-420 lbs.  Weight 391 lbs on 10/19.     Patient now (at TCU) with orthostatic hypotension Likely related to dehydration 2/2 diuretics, fluid restriction.   Improving as we held furosemide and lisinopril. Vitals stable at current.   Clinically asymptomatic at the time of discharge day.    No CP or SOB or LH or palpitations.      Resuming Lisinopril lower dose 2.5 mg daily.   May resume lasix 20 mg daily  Patient to fu at PCP, fu BMP and BP, Wt and titrate up as needed.   Patient extensively counseled.   liberalized FR. Counseled upto 2L on discharge.     Non pharmacological ortho precautions: keep hydrated, head elevation by 20 degree. Slow rise in am. abd binder. Ct ace wrap to LE.     - Continue Metoprolol XL to 100 mg with hold paratmets  -  Follow with CHF clinic   - Low salt diet      #Hypertension:   Intermittent episodes of Hypotension. He was  on lasix 80 mg + lisinopril 10 mg + Metoprolol 200 mg BID + Diltiazem 30 mg BID. Diltiazem was held Diltiazem Blood pressure improved after holding Diltiazem. Also BP remained on the lower side patient was asymptomatic, so Lisinopril decreased to 5 mg,  Furosemide decreased to 40 mg, and Metoprolol decreased to 100 mg .     - on discharge dose adjusted as above 2/2 orthostatic hypotension- improving.   Vitals stable at the time of dc.          # DMII: On Metformin at home. HgbA1c 6.5 on 8/14/17.  Resumed back on Metformin  Blood sugars very well controlled.  - Continue Metformin      #Peripheral Vascular Disease  #Venous Stasis   Reports poor sensation up on admission which is improving now  Possibly have peripheral neuropathy due to Diabetes and chronic venous stasis  Edema therapy team was consulted.       #Depression  History of Depression and PTSD with limited social support. Declined therapy and in hospital chaplaincy services. No antidepressants at home. Ongoing concern for recent DNR/DNI code.Psychiatry evaluated patient and did not believe depression was factoring on DNI/DNR and possible comfort care decision. Palliative care is involved and at this moment patient is agreeable to continue management and optimization of respiratory status as it is improving  Evaluated by Health Psychologist in TCU. Last seen on 10/20  - Advise to follow up with Primary care       # Lower Extremity Cellulitis: Improving  Bilateral lower extremity with stasis dermatitis and ulcers.  He was treated with Cephalexin. WOC and lymphedema was consulted   - Compression stockings    #Perisplenic Free Fluid: Noted on CT Chest in the ED 9/24, possible splenic laceration and perisplenic fluid collection following fall. Hgb has remained stable, no increase in abdominal  distension, no abdominal pain. Evaluated by general surgery while in hospital. No intervention.          #Hypothyroidism: TSH on admission 3.11, on Synthroid 150 mcg at home  - Continue synthroid 150 mcg       #Hypercapnic Encephalopathy: Resolved         # Urinary retention with urgency :   Possible BPH. He is on  Tamsulosin 0.4 mg, dose was increased to  0.8 mg  - Decrease Tamsulosin to 0.4 mg due to intermittent hypotension, if no improvement then may have to DC  - continue to monitor       # Deconditioning: progressing well with therapy. PT/OT.     # FEN: CHO consistent, and low sodium diet.   # Prophylaxis:On Warfarin   Indication for psychotropic medications: none                Discharge Medications:     Discharge Medication List as of 10/27/2017 11:07 AM      START taking these medications    Details   polyethylene glycol (MIRALAX/GLYCOLAX) Packet Take 17 g by mouth daily as needed for constipation, Disp-15 packet, R-0, E-Prescribe      senna-docusate (SENOKOT-S;PERICOLACE) 8.6-50 MG per tablet Take 1-2 tablets by mouth 2 times daily as needed for constipation, Disp-60 tablet, R-1, E-Prescribe      ascorbic acid 500 MG TABS Take 1 tablet (500 mg) by mouth daily for 8 days, Disp-8 tablet, R-0, E-Prescribe      vitamin A 96344 UNIT capsule Take 2 capsules (20,000 Units) by mouth daily for 8 days, Disp-16 capsule, R-0, E-Prescribe         CONTINUE these medications which have CHANGED    Details   furosemide (LASIX) 20 MG tablet Take 1 tablet (20 mg) by mouth daily, Disp-30 tablet, R-0, Local PrintHold for sbp<100  Take extra 20 mg daily as needed for weight gain of 2lb in 2-3 days or 5 lb in a week.   Talk to your doctor if any concern.      lisinopril (PRINIVIL/ZESTRIL) 5 MG tablet Take 0.5 tablets (2.5 mg) by mouth daily, Disp-30 tablet, R-0, E-PrescribeHold for sbp<100      metoprolol (TOPROL-XL) 100 MG 24 hr tablet Take 1 tablet (100 mg) by mouth daily, Disp-30 tablet, R-0, Local PrintTitrate up as  tolerated under supervision of your primary care provider.  Hold for sbp<95. Or HR<55      warfarin (COUMADIN) 5 MG tablet Take 2.5 tablets (12.5 mg) by mouth daily, Disp-30 tablet, R-0, E-PrescribeFollow up with Primary care for INR next week.      potassium chloride SA (K-DUR/KLOR-CON M) 20 MEQ CR tablet Take 2 tablets (40 mEq) by mouth daily, Disp-30 tablet, R-0, E-Prescribe      aspirin 81 MG tablet Take 1 tablet (81 mg) by mouth daily, Disp-30 tablet, R-0, E-Prescribe      atorvastatin (LIPITOR) 40 MG tablet Take 1 tablet (40 mg) by mouth daily, Disp-30 tablet, R-0, E-Prescribe      ipratropium - albuterol 0.5 mg/2.5 mg/3 mL (DUONEB) 0.5-2.5 (3) MG/3ML neb solution Take 1 vial (3 mLs) by nebulization every 4 hours as needed for wheezing, Disp-360 mL, R-3, E-Prescribe      levothyroxine (SYNTHROID/LEVOTHROID) 150 MCG tablet Take 1 tablet (150 mcg) by mouth daily, Disp-30 tablet, R-0, E-Prescribe      metFORMIN (GLUCOPHAGE) 500 MG tablet Take 1 tablet (500 mg) by mouth 2 times daily (with meals), Disp-60 tablet, R-0, E-Prescribe      multivitamin, therapeutic with minerals (MULTI-VITAMIN) TABS tablet Take 1 tablet by mouth daily, Disp-30 each, R-0, E-Prescribe      omega 3 1000 MG CAPS Take 1 g by mouth daily, Disp-30 capsule, R-0, E-Prescribe      zinc sulfate (ZINCATE) 220 (50 ZN) MG capsule Take 1 capsule (220 mg) by mouth daily, Disp-30 capsule, R-0, E-Prescribe         CONTINUE these medications which have NOT CHANGED    Details   tamsulosin (FLOMAX) 0.4 MG capsule Take 1 capsule (0.4 mg) by mouth daily, Disp-60 capsule, R-1, Transitional         STOP taking these medications       diltiazem (CARDIZEM) 30 MG tablet Comments:   Reason for Stopping:                   Procedures/Consultations:   No procedures performed during this admission  Health Psychology.            Final Day Exam:    Doing better  Orthostatic vitals improving.   Claims drinking better.    denies LH or dizziness or cp or sob  Eating,  voiding well.   Ambulating with walker.   No other concern.       Physical Exam:  Blood pressure 120/69, pulse 91, temperature 99.2  F (37.3  C), temperature source Oral, resp. rate 17, height 1.829 m (6'), weight (!) 181.9 kg (401 lb), SpO2 94 %.    General: alert, interactive, NAD, obese.   HEENT: AT/NC, Moist MM  Respi/Chest: Non labored. Clear bl  CVS/Heart: S1S2 no murmur. bl pedal edema on ace wraps  Gi/Abd: Soft, non tender, non distended, obese  MSK/Ext: Distal pulses 2+.     Neuro: AO x 4, grossly intact  Psychiatry: Pleasant.         Data:  All laboratory data reviewed             Condition on Discharge:   Discharge condition: Stable                    Discharge Disposition:   Discharged to home               Pending Results:   Unresulted Labs Ordered in the Past 30 Days of this Admission     Date and Time Order Name Status Description    9/24/2017 0430 CBC with platelets In process     9/24/2017 0430 Basic metabolic panel In process                   Discharge Instructions and Follow-Up:     Discharge Procedure Orders  Home care nursing referral   Referral Type: Home Health Therapies & Aides     Home Care PT Referral for Hospital Discharge   Referral Type: Home Health Therapies & Aides     Home Care OT Referral for Hospital Discharge     Activity   Order Comments: Your activity upon discharge: activity as tolerated   Order Specific Question Answer Comments   Is discharge order? Yes      MD face to face encounter   Order Comments: Documentation of Face to Face and Certification for Home Health Services    I certify that patient: Clarke Moreira is under my care and that I, or a nurse practitioner or physician's assistant working with me, had a face-to-face encounter that meets the physician face-to-face encounter requirements with this patient on: 10/21/2017.    This encounter with the patient was in whole, or in part, for the following medical condition, which is the primary reason for home health  care: Morbid obesity, HTN, Heart Failure, Respiratory failure    I certify that, based on my findings, the following services are medically necessary home health services: Nursing, Occupational Therapy and Physical Therapy.    My clinical findings support the need for the above services because: Occupational Therapy Services are needed to assess and treat ADL safety, Physical therapy for mobility issues, Nursing for medication management.     Further, I certify that my clinical findings support that this patient is homebound (i.e. absences from home require considerable and taxing effort and are for medical reasons or [facility] services or infrequently or of short duration when for other reasons) because: Leaving home is medically contraindicated for the following reason(s): Morbid obesity, Heart failure, Respiratory failure    Based on the above findings. I certify that this patient is confined to the home and needs intermittent skilled nursing care, physical therapy and/or speech therapy.  The patient is under my care, and I have initiated the establishment of the plan of care.  This patient will be followed by a physician who will periodically review the plan of care.  Physician/Provider to provide follow up care: Matthias Sanchez    Attending hospital physician (the Medicare certified [place] provider): Ari Reyna  Physician Signature: See electronic signature associated with these discharge orders.  Date: 10/23/2017     Adult [facility] Follow-up and recommended labs and tests   Order Comments: Follow up with primary care provider, Matthias Sanchez, within 7 days for hospital follow- up.  The following labs/tests are recommended: CBC, BMP, INR  Follow up with [facility] in 2-4 weeks  Outpatient Sleep study    Appointments on [place] and/or [facility] (with [facility] or [facility] provider or service). Call [phone] if you haven't heard regarding these appointments within 7 days of discharge.     Monitor and record    Order Comments: Watch for fever, chills, nausea, vomiting, chest pain, shortness of breath, lightheadedness or dizziness, increased swelling in your leg  If these symptoms occurs, please call your primary care or come to ED    Monitor Blood pressure, weight 2-3 times weekly.     Reason for your hospital stay   Order Comments: Admitted for rehabilitation s/p respiratory failure.  In TCU , orthostatic hypotension, your blood pressure and diuretics dose adjusted.     DNR/DNI     Diet   Order Comments: Fluid restriction 1500 ML FLUID, 2 gm Sodium diet.  Moderate Consistent CHO (4-6 CHO units/meal)   Snacks/Supplements Adult: Other - Please comment; Gelatein; With Meals     Snacks/Supplements Adult: Other - Please comment; Glucerna or TwoCal HN; Between Meals   Order Specific Question Answer Comments   Is discharge order? Yes               Time spent on patient: 45 minutes total including face to face and coordinating care time reviewing current illness, any medication changes, and the care plan for today.    D/w RN, SW, CC      Oswald Mcneal MD   Hospitalist (Internal Medicine)  [facility]  Pager: [phone].   10/27/17

## 2017-10-23 NOTE — PLAN OF CARE
Problem: Goal Outcome Summary  Goal: Goal Outcome Summary  Patient alert and oriented. Mod I in room. 2 sennakot given at bedtime. Blood sugar 134 at supper. BLE wraps removed and wound care done. Rewrapped. Appetite good, continues on 1500 ml fluid restriction. Pt voiced some concern and anxiety over discharge plan. He states that the  plan is for him to d/c Tuesday, feels unsure about how things will work at home. Support given.

## 2017-10-23 NOTE — PLAN OF CARE
Problem: Goal Outcome Summary  Goal: Goal Outcome Summary  Outcome: Therapy, progress toward functional goals as expected  PTA: Wide ww, leg , and reacher issued to pt. Ww height adjusted for pt. Information also given to answer pt's question on how to obtain diabetic shoes. PTA spoke to Yuly in Orthotics. Pt will need an order and chart notes from Dr henderson: reasons for needing diabetic shoes. Pt plans to speak to his primary physician since they were the ones to first diagnose him.

## 2017-10-24 ENCOUNTER — APPOINTMENT (OUTPATIENT)
Dept: LAB | Facility: CLINIC | Age: 67
End: 2017-10-24
Attending: INTERNAL MEDICINE
Payer: COMMERCIAL

## 2017-10-24 LAB
GLUCOSE BLDC GLUCOMTR-MCNC: 106 MG/DL (ref 70–99)
GLUCOSE BLDC GLUCOMTR-MCNC: 111 MG/DL (ref 70–99)
INR PPP: 2.39 (ref 0.86–1.14)

## 2017-10-24 PROCEDURE — 40000902 ZZH STATISTIC WOC PT EDUCATION, 16-30 MIN

## 2017-10-24 PROCEDURE — 00000146 ZZHCL STATISTIC GLUCOSE BY METER IP

## 2017-10-24 PROCEDURE — 29581 APPL MULTLAYER CMPRN SYS LEG: CPT | Mod: 50

## 2017-10-24 PROCEDURE — 25000132 ZZH RX MED GY IP 250 OP 250 PS 637: Performed by: INTERNAL MEDICINE

## 2017-10-24 PROCEDURE — 12000022 ZZH R&B SNF

## 2017-10-24 PROCEDURE — 99308 SBSQ NF CARE LOW MDM 20: CPT | Performed by: INTERNAL MEDICINE

## 2017-10-24 PROCEDURE — 85610 PROTHROMBIN TIME: CPT | Performed by: INTERNAL MEDICINE

## 2017-10-24 PROCEDURE — 25000128 H RX IP 250 OP 636: Performed by: INTERNAL MEDICINE

## 2017-10-24 PROCEDURE — 36592 COLLECT BLOOD FROM PICC: CPT | Performed by: INTERNAL MEDICINE

## 2017-10-24 RX ORDER — FUROSEMIDE 40 MG
40 TABLET ORAL
Status: DISCONTINUED | OUTPATIENT
Start: 2017-10-24 | End: 2017-10-27 | Stop reason: HOSPADM

## 2017-10-24 RX ORDER — WARFARIN SODIUM 5 MG/1
10 TABLET ORAL
Status: COMPLETED | OUTPATIENT
Start: 2017-10-24 | End: 2017-10-24

## 2017-10-24 RX ADMIN — ZINC SULFATE CAP 220 MG (50 MG ELEMENTAL ZN) 220 MG: 220 (50 ZN) CAP at 08:36

## 2017-10-24 RX ADMIN — METOPROLOL SUCCINATE 100 MG: 100 TABLET, EXTENDED RELEASE ORAL at 08:39

## 2017-10-24 RX ADMIN — ATORVASTATIN CALCIUM 40 MG: 40 TABLET, FILM COATED ORAL at 08:37

## 2017-10-24 RX ADMIN — ASPIRIN 81 MG CHEWABLE TABLET 81 MG: 81 TABLET CHEWABLE at 08:37

## 2017-10-24 RX ADMIN — SODIUM CHLORIDE, PRESERVATIVE FREE 5 ML: 5 INJECTION INTRAVENOUS at 07:52

## 2017-10-24 RX ADMIN — SODIUM CHLORIDE, PRESERVATIVE FREE 5 ML: 5 INJECTION INTRAVENOUS at 05:31

## 2017-10-24 RX ADMIN — Medication 1 G: at 08:37

## 2017-10-24 RX ADMIN — TAMSULOSIN HYDROCHLORIDE 0.4 MG: 0.4 CAPSULE ORAL at 08:38

## 2017-10-24 RX ADMIN — MULTIPLE VITAMINS W/ MINERALS TAB 1 TABLET: TAB at 08:36

## 2017-10-24 RX ADMIN — WARFARIN SODIUM 10 MG: 5 TABLET ORAL at 18:22

## 2017-10-24 RX ADMIN — POTASSIUM CHLORIDE 40 MEQ: 1500 TABLET, EXTENDED RELEASE ORAL at 08:37

## 2017-10-24 RX ADMIN — Medication 20000 UNITS: at 08:36

## 2017-10-24 RX ADMIN — METFORMIN HYDROCHLORIDE 500 MG: 500 TABLET ORAL at 18:22

## 2017-10-24 RX ADMIN — POTASSIUM CHLORIDE 40 MEQ: 1500 TABLET, EXTENDED RELEASE ORAL at 21:37

## 2017-10-24 RX ADMIN — LEVOTHYROXINE SODIUM 150 MCG: 100 TABLET ORAL at 05:31

## 2017-10-24 RX ADMIN — OXYCODONE HYDROCHLORIDE AND ACETAMINOPHEN 500 MG: 500 TABLET ORAL at 08:38

## 2017-10-24 RX ADMIN — METFORMIN HYDROCHLORIDE 500 MG: 500 TABLET ORAL at 08:36

## 2017-10-24 NOTE — PROGRESS NOTES
[facility] Inpatient Adult WoundAssessment    Follow up Assessment  Reason for consultation: Evaluate and treat right plantar heel wound and left dorsal foot wound.    Assessment:   Right plantar heel wound due to Pressure Injury  Left dorsal foot wound due to trauma/skin trear - 10/24: resolved.    Pressure Injury is Stage 3 Present on admission Yes    Contributing factor of the pressure injury: pressure and immobility  Status: is healing, Stable    Photo: see below    TREATMENT  PLAN    Right plantar heel wound: wash every other day with wound cleanser and gauze. Apply Medihoney to wound base and cover with Mepilex 4x4.  Left dorsal foot wound: wash every other day with wound cleanser and gauze. Cover with Adaptic followed by Optifoam under lymphedema wraps.  Bilateral lower legs: wash with soap and water and wash cloth prior to lymphedema wrap placement. Moisturize freely with Sween 24.  Orders Written  [facility] follow-up plan:weekly  Nursing to notify the Provider(s) and re-consult the [facility] if wound(s) deteriorates or new skin concern.    Patient History  According to provider note(s):  Clarke Sotelo is a 67 year old male with a history notable for morbid obesity, atrial fibrillation, HFrEF, diabetes, PVD, and untreated depression that was admitted following a fall and CP/SOB on 9/23.  Transferred to MICU on 9/24 for AMS with acute hypoxic/hypercarbic respiratory failure and hypotension requiring initiation of BiPAP and pressors.  Much improved following nasal airway and BiPAP, off pressors and transferred to general medicine for further management.    Patient transferred to TCU on 10/8/2017 for further rehab.    Objective Data   Containment of urine/stool: Continent of bowel and Continent of bladder    Current Diet/ Nutrition:    Active Diet Order      Combination Diet 3585-4429 Calories: Moderate Consistent CHO (4-6 CHO units/meal)      Diet    Output:   I/O last 3 completed shifts:  In: 90  "[P.O.:90]  Out: 700 [Urine:700]    Skin Assessment: Peterson Peterson Score  Av.7  Min: 13  Max: 19                                Peterson Q No Data Recorded                     NSRAS No Data Recorded     Labs:     Recent Labs  Lab 10/24/17  0801 10/23/17  0708   HGB  --  15.7   INR 2.39* 2.48*   WBC  --  5.5         No lab results found in last 7 days.    Physical Exam    Skin assessment:   Focused skin inspection: bilateral lower legs and feet. Legs with discoloration due to chronic edema/hemosiderin staining. Overall improving in edema control and epidermal peeling    Wound Location:  Right plantar heel    10/17/17 right posterior heel    Wound History: Present on admission. Had been staged as \"unstageable\" due to slough. Now clean, granular base present  Measurements (length x width x depth, in cm) Now two wounds with bridge of skin between 2 cm x 2 cm x 0.1 cm and 0.5 cm x 0.5 cm x 0.1 cm   Wound Base:  100% granulation tissue  Tunneling N/A  Undermining N/A  Palpation of the wound bed: normal   Periwound skin: intact with thick callous  Color: normal and consistent with surrounding tissue  Temperature: normal   Drainage: scant  Description of drainage: serosanguinous  Odor: none  Pain: denies     Wound Location:  Left plantar foot  Wound History: Present on admission. Patient states his sandal strap had been covering this area and was most likely injured when removing his shoe.  Measurements (length x width x depth, in cm) skin now intact  Wound Base:  intact  Palpation of the wound bed: n/a  Periwound skin: intact  Color: purple due to chronic lymphedema  Temperature: normal   Drainage:, none  Description of drainage: none  Odor: none  Pain: denies     Interventions  Current support surface: Standard  Atmos Air    Current off-loading measures: Foam padding and Pillows under calves  Visual inspection of wound(s) completed  Wound Care:was done per plan of care    Cleansing with wound cleanser " solution  Supplies: Reviewed, available at bedside  Education provided today: wound care, compression    Discussed plan of care with Patient    Face to face time: 30 minutes

## 2017-10-24 NOTE — PLAN OF CARE
Pt A&OX4, very pleasant and appreciative. BLE much improved from writer's first assessment at pt's admission. Pt appreciative of cares received at unit, is hopeful regarding discharge to home, awaiting more info from [facility] home care RN./cont POC.

## 2017-10-24 NOTE — PROGRESS NOTES
[facility]   Met with pt to discuss plans for HC.  Pt to be discharged home (date to be determined)  and has agreed to have [facility] follow with services of SN, PT and OT. Patient care support center processing referral.  Pt verbalized understanding that initial visit is scheduled for 1-2 days after discharge.  Pt has 24 hour phone number for [facility] for any questions or concerns.

## 2017-10-24 NOTE — PHARMACY-ANTICOAGULATION SERVICE
Clinical Pharmacy- Warfarin Discharge Note  This patient is currently on warfarin for the treatment of Atrial fibrillation.  INR Goal= 2-3  Expected length of therapy lifetime.    Anticoagulation Dose History     Recent Dosing and Labs Latest Ref Rng & Units 10/18/2017 10/19/2017 10/20/2017 10/21/2017 10/22/2017 10/23/2017 10/24/2017    ZZ IMS TEMPLATE - 12.5 mg - 12.5 mg - - 12.5 mg -    Warfarin 5 mg - - 10 mg - 10 mg 10 mg - -    INR 0.86 - 1.14 2.69(H) 3.00(H) 2.69(H) 2.96(H) 2.85(H) 2.48(H) 2.39(H)        Vitamin K doses administered during the last 7 days: none  FFP administered during the last 7 days: none  Recommend discharging the patient on a warfarin regimen of 12.5mg four days per week (M/W/F/SAT) and 10mg three days per week (T/TH/SUN) with a prescription for warfarin 5mg tablets.      The patient should have an INR checked Friday, October 27th, 2017.     Devorah Nova, MirtaD, BCPS

## 2017-10-24 NOTE — PLAN OF CARE
Problem: Goal Outcome Summary  Goal: Goal Outcome Summary  Outcome: No Change  Patient is alert x 4 and he directs his cares. Patient did not go home today due to his orthostatic B/p were low. MD encouraged the patient to drink more fluids. Patient slightly disappointed that he wasn't able to go since he had made arrangements. Continue to  Monitor patient's B/p's and c/o's of any dizziness or lightheadedness,which he denied this shift. Patient has his Bilateral wraps on. No prn's given.

## 2017-10-24 NOTE — PROGRESS NOTES
[facility], [place]   Hospitalist Daily Progress Note                                                 Date of Admission:10/8/2017  ___________________________________  INTERVAL HISTORY (24 Hrs)/SUBJECTIVE:   Last 24 hr events, care team notes reviewed.     Patient still orthostatic.   Mild LH. Not as bad as yesterday  No other new concern.     ROS: 4 point ROS neg other than the symptoms noted above in the interval history.    OBJECTIVE :   VITALS:    Temp:  [97  F (36.1  C)-97.5  F (36.4  C)] 97.5  F (36.4  C)  Heart Rate:  [95] 95  Resp:  [20] 20  BP: ()/(60-71) 102/60  SpO2:  [93 %] 93 %  Temp (24hrs), Av.3  F (36.3  C), Min:97  F (36.1  C), Max:97.5  F (36.4  C)    Wt Readings from Last 5 Encounters:   10/19/17 (!) 177.6 kg (391 lb 8 oz)   10/07/17 (!) 184 kg (405 lb 10.3 oz)   17 (!) 191 kg (421 lb)   16 (!) 187.3 kg (413 lb)   16 (!) 187.4 kg (413 lb 3.2 oz)        Intake/Output Summary (Last 24 hours) at 10/24/17 1411  Last data filed at 10/24/17 0800   Gross per 24 hour   Intake              100 ml   Output              600 ml   Net             -500 ml       PHYSICAL EXAM:  General: alert, interactive, NAD, obese.   HEENT: AT/NC, Moist MM  Respi/Chest: Non labored. Clear BL  CVS/Heart: S1S2 no murmur. bl pedal edema   Gi/Abd: Soft, non tender, non distended,   MSK/Ext: Distal pulses 2+.     Neuro: AO x 3     Medications:   I have reviewed this patient's current medications.      Data:   All laboratory and imaging data in the past 24 hours reviewed:    LABS:  CMP  Recent Labs  Lab 10/23/17  0708 10/19/17  0813    138   POTASSIUM 4.2 4.2   CHLORIDE 104 105   CO2 29 29   ANIONGAP 6 4   * 109*   BUN 9 7   CR 0.69 0.64*   GFRESTIMATED >90 >90   GFRESTBLACK >90 >90   CHERI 8.8 9.0   MAG 1.9 1.8     CBC  Recent Labs  Lab 10/23/17  0708 10/19/17  0813   WBC 5.5 5.9   RBC 5.27 5.07   HGB 15.7 15.4   HCT 49.2 48.1   MCV 93 95   MCH 29.8 30.4   MCHC  31.9 32.0   RDW 14.2 14.2    217     INR  Recent Labs  Lab 10/24/17  0801 10/23/17  0708 10/22/17  0551 10/21/17  0633   INR 2.39* 2.48* 2.85* 2.96*     Unresulted Labs Ordered in the Past 30 Days of this Admission     Date and Time Order Name Status Description    9/24/2017 0430 CBC with platelets In process     9/24/2017 0430 Basic metabolic panel In process           No results found for this or any previous visit (from the past 24 hour(s)).      ASSESSMENT & PLAN :    67 year old male with a history notable for morbid obesity, atrial fibrillation, HFrEF, diabetes, PVD, and untreated depression that was admitted following a fall and CP/SOB on 9/23. Hospital course complicated by  AMS with acute hypoxic/hypercarbic respiratory failure and hypotension requiring initiation of BiPAP and pressors. Transferred to  TCU on 10/09 for ongoing rehabilitation    # Orthostatic hypotension:   Likely related to dehydration 2/2 diuretics, fluid restriction.   Patient still orthostatic systolic dropped more than 20 pts. However did not feel LH like yesterday.   No cp or sob or palpitations.     Lisinopril on hold. Continue to hold  Hold furosemide for now  Dc fluid restriction for now. liberalize fluid intake, patient counseled.   Ct metoprolol.   Daily orthostatic vitals  Fall precautions.   I/o, daily wt    # other issues as prior.     Dispo: hold dc given ongoing orthostatic hypotension, symptomatic.   [facility], bmp tomorrow.       Ramona Mcneal MD   Hospitalist (Internal Medicine)  Pager: [phone].

## 2017-10-24 NOTE — PLAN OF CARE
Problem: Goal Outcome Summary  Goal: Goal Outcome Summary  Outcome: Adequate for Discharge Date Met:  10/24/17  Patient sleeping between cares, using call light and communicate needs clearly. Patient denies pain and shortness of breath, C-pap on and off during this shift, urinal at bedside, no BM this shift. Patient verbalized feeling hopeful for pending discharge today, up independently using walker and transfer from bed to chair, bilateral lower extremities wraps intact. continue current plan of care

## 2017-10-25 ENCOUNTER — DOCUMENTATION ONLY (OUTPATIENT)
Dept: SLEEP MEDICINE | Facility: CLINIC | Age: 67
End: 2017-10-25

## 2017-10-25 LAB
ANION GAP SERPL CALCULATED.3IONS-SCNC: 5 MMOL/L (ref 3–14)
BUN SERPL-MCNC: 9 MG/DL (ref 7–30)
CALCIUM SERPL-MCNC: 8.8 MG/DL (ref 8.5–10.1)
CHLORIDE SERPL-SCNC: 101 MMOL/L (ref 94–109)
CO2 SERPL-SCNC: 28 MMOL/L (ref 20–32)
CREAT SERPL-MCNC: 0.67 MG/DL (ref 0.66–1.25)
ERYTHROCYTE [DISTWIDTH] IN BLOOD BY AUTOMATED COUNT: 14.4 % (ref 10–15)
GFR SERPL CREATININE-BSD FRML MDRD: >90 ML/MIN/1.7M2
GLUCOSE BLDC GLUCOMTR-MCNC: 100 MG/DL (ref 70–99)
GLUCOSE BLDC GLUCOMTR-MCNC: 117 MG/DL (ref 70–99)
GLUCOSE SERPL-MCNC: 150 MG/DL (ref 70–99)
HCT VFR BLD AUTO: 49.1 % (ref 40–53)
HGB BLD-MCNC: 15.5 G/DL (ref 13.3–17.7)
MCH RBC QN AUTO: 29.6 PG (ref 26.5–33)
MCHC RBC AUTO-ENTMCNC: 31.6 G/DL (ref 31.5–36.5)
MCV RBC AUTO: 94 FL (ref 78–100)
PLATELET # BLD AUTO: 188 10E9/L (ref 150–450)
POTASSIUM SERPL-SCNC: 4.2 MMOL/L (ref 3.4–5.3)
RBC # BLD AUTO: 5.23 10E12/L (ref 4.4–5.9)
SODIUM SERPL-SCNC: 134 MMOL/L (ref 133–144)
WBC # BLD AUTO: 6 10E9/L (ref 4–11)

## 2017-10-25 PROCEDURE — 80048 BASIC METABOLIC PNL TOTAL CA: CPT | Performed by: INTERNAL MEDICINE

## 2017-10-25 PROCEDURE — 25000132 ZZH RX MED GY IP 250 OP 250 PS 637: Performed by: INTERNAL MEDICINE

## 2017-10-25 PROCEDURE — 99308 SBSQ NF CARE LOW MDM 20: CPT | Performed by: INTERNAL MEDICINE

## 2017-10-25 PROCEDURE — 85027 COMPLETE CBC AUTOMATED: CPT | Performed by: INTERNAL MEDICINE

## 2017-10-25 PROCEDURE — 25000128 H RX IP 250 OP 636: Performed by: INTERNAL MEDICINE

## 2017-10-25 PROCEDURE — 00000146 ZZHCL STATISTIC GLUCOSE BY METER IP

## 2017-10-25 PROCEDURE — 36592 COLLECT BLOOD FROM PICC: CPT | Performed by: INTERNAL MEDICINE

## 2017-10-25 PROCEDURE — 12000022 ZZH R&B SNF

## 2017-10-25 RX ADMIN — MULTIPLE VITAMINS W/ MINERALS TAB 1 TABLET: TAB at 08:59

## 2017-10-25 RX ADMIN — POTASSIUM CHLORIDE 40 MEQ: 1500 TABLET, EXTENDED RELEASE ORAL at 19:57

## 2017-10-25 RX ADMIN — SODIUM CHLORIDE, PRESERVATIVE FREE 10 ML: 5 INJECTION INTRAVENOUS at 05:26

## 2017-10-25 RX ADMIN — ASPIRIN 81 MG CHEWABLE TABLET 81 MG: 81 TABLET CHEWABLE at 09:00

## 2017-10-25 RX ADMIN — TAMSULOSIN HYDROCHLORIDE 0.4 MG: 0.4 CAPSULE ORAL at 09:00

## 2017-10-25 RX ADMIN — ATORVASTATIN CALCIUM 40 MG: 40 TABLET, FILM COATED ORAL at 09:00

## 2017-10-25 RX ADMIN — METFORMIN HYDROCHLORIDE 500 MG: 500 TABLET ORAL at 18:19

## 2017-10-25 RX ADMIN — METOPROLOL SUCCINATE 100 MG: 100 TABLET, EXTENDED RELEASE ORAL at 09:00

## 2017-10-25 RX ADMIN — LEVOTHYROXINE SODIUM 150 MCG: 100 TABLET ORAL at 05:26

## 2017-10-25 RX ADMIN — METFORMIN HYDROCHLORIDE 500 MG: 500 TABLET ORAL at 09:00

## 2017-10-25 RX ADMIN — SENNOSIDES 1 TABLET: 8.6 TABLET, FILM COATED ORAL at 14:32

## 2017-10-25 RX ADMIN — OXYCODONE HYDROCHLORIDE AND ACETAMINOPHEN 500 MG: 500 TABLET ORAL at 09:00

## 2017-10-25 RX ADMIN — Medication 1 G: at 09:00

## 2017-10-25 RX ADMIN — SODIUM CHLORIDE, PRESERVATIVE FREE 5 ML: 5 INJECTION INTRAVENOUS at 08:41

## 2017-10-25 RX ADMIN — ZINC SULFATE CAP 220 MG (50 MG ELEMENTAL ZN) 220 MG: 220 (50 ZN) CAP at 09:00

## 2017-10-25 RX ADMIN — POTASSIUM CHLORIDE 40 MEQ: 1500 TABLET, EXTENDED RELEASE ORAL at 08:59

## 2017-10-25 RX ADMIN — WARFARIN SODIUM 12.5 MG: 7.5 TABLET ORAL at 18:19

## 2017-10-25 RX ADMIN — Medication 20000 UNITS: at 08:59

## 2017-10-25 NOTE — PLAN OF CARE
Problem: Goal Outcome Summary  Goal: Goal Outcome Summary  Pt alert and oriented x 4, very pleasant and cooperative with cares. BLE wound care done on AM shift by a WOCN, Lymphedema wraps intact. Has no c/o pain or discomfort. BP improved, Possible DC home tomorrow. CPAP on  At HS call within reach.

## 2017-10-25 NOTE — PROGRESS NOTES
[facility], [place]   Hospitalist Daily Progress Note                                                 Date of Admission:10/8/2017  ___________________________________  INTERVAL HISTORY (24 Hrs)/SUBJECTIVE:   Last 24 hr events, care team notes reviewed.     Patient still with significant orthostatic vitals.   However denies LH or dizziness or cp or sob  Says still urinating a lot. Wt loss of almost 75 lbs since hospital admission.   No other concern  Afebrile.        ROS: 4 point ROS neg other than the symptoms noted above in the interval history.    OBJECTIVE :   VITALS:    Temp:  [97  F (36.1  C)] 97  F (36.1  C)  Pulse:  [] 91  Heart Rate:  [114] 114  Resp:  [16-18] 16  BP: (101-113)/(60-78) 113/60  SpO2:  [96 %] 96 %  Temp (24hrs), Av.3  F (36.3  C), Min:97  F (36.1  C), Max:97.5  F (36.4  C)    Wt Readings from Last 5 Encounters:   10/25/17 (!) 177.8 kg (392 lb)   10/07/17 (!) 184 kg (405 lb 10.3 oz)   17 (!) 191 kg (421 lb)   16 (!) 187.3 kg (413 lb)   16 (!) 187.4 kg (413 lb 3.2 oz)          Intake/Output Summary (Last 24 hours) at 10/25/17 1328  Last data filed at 10/25/17 0629   Gross per 24 hour   Intake                0 ml   Output             2425 ml   Net            -2425 ml       PHYSICAL EXAM:  General: alert, interactive, NAD, obese.   HEENT: AT/NC, Moist MM  Respi/Chest: Non labored.   CVS/Heart: S1S2 no murmur. bl pedal edema on ace wraps  Gi/Abd: Soft, non tender, non distended, obese  MSK/Ext: Distal pulses 2+.     Neuro: AO x 3     Medications:   I have reviewed this patient's current medications.      Data:   All laboratory and imaging data in the past 24 hours reviewed:    LABS:  CMP    Recent Labs  Lab 10/25/17  0844 10/23/17  0708 10/19/17  0813    139 138   POTASSIUM 4.2 4.2 4.2   CHLORIDE 101 104 105   CO2 28 29 29   ANIONGAP 5 6 4   * 106* 109*   BUN 9 9 7   CR 0.67 0.69 0.64*   GFRESTIMATED >90 >90 >90   GFRESTBLACK  >90 >90 >90   CHERI 8.8 8.8 9.0   MAG  --  1.9 1.8     CBC    Recent Labs  Lab 10/25/17  0844 10/23/17  0708 10/19/17  0813   WBC 6.0 5.5 5.9   RBC 5.23 5.27 5.07   HGB 15.5 15.7 15.4   HCT 49.1 49.2 48.1   MCV 94 93 95   MCH 29.6 29.8 30.4   MCHC 31.6 31.9 32.0   RDW 14.4 14.2 14.2    182 217     INR    Recent Labs  Lab 10/24/17  0801 10/23/17  0708 10/22/17  0551 10/21/17  0633   INR 2.39* 2.48* 2.85* 2.96*     Unresulted Labs Ordered in the Past 30 Days of this Admission     Date and Time Order Name Status Description    9/24/2017 0430 CBC with platelets In process     9/24/2017 0430 Basic metabolic panel In process           No results found for this or any previous visit (from the past 24 hour(s)).      ASSESSMENT & PLAN :    67 year old male with a history notable for morbid obesity, atrial fibrillation, HFrEF, diabetes, PVD, and untreated depression that was admitted following a fall and CP/SOB on 9/23. Hospital course complicated by  AMS with acute hypoxic/hypercarbic respiratory failure and hypotension requiring initiation of BiPAP and pressors. Transferred to  TCU on 10/09 for ongoing rehabilitation    # Orthostatic hypotension:   Likely related to dehydration 2/2 diuretics, fluid restriction.   Patient still orthostatic systolic dropped more than 20 pts.   Clinically asymptomatic today.   No cp or sob or palpitations.     Continue to hold lisinopril, furosemide for now  Dc fluid restriction for now. liberalize fluid intake, patient counseled.   Ct metoprolol.   Daily orthostatic vitals  Non pharmacological ortho precautions: keep hydrated, head elevation by 20 degree. Slow rise in am. abd binder. Ct ace wrap to LE.     Fall precautions.   I/o, daily wt    # other issues as prior.     Dispo: hold dc given ongoing orthostatic hypotension and risk for fall. Lives alone.     D/w RN, SW. CC        Oswald Mcneal MD   Hospitalist (Internal Medicine)  Pager: [phone].

## 2017-10-25 NOTE — CONSULTS
[facility], Orthopaedic Surgery Consultation    Clarke oMreira MRN# [id number]   Age: 67 year old YOB: 1950     Date of Admission:  10/8/2017    Reason for consult: Toenail trim       Requesting physician: Dr. Olson         Assessment and Plan:   Assessment:  DM type 2 complicated by peripheral neuropathy  PVD  Right heel ulceration  Pincer type nail deformity    Plan:  Pt seen and evaluated. Toenails trimmed x 10. Scant bleeding to right second digit upon debridement, bleeding stopped and bandaid applied. Okay to remove tonight. Suggest follow up with podiatry in 2-3 months.           History of Present Illness:   Patient was seen and examined by me. History, PMH, Meds, SH, ROS and PE reviewed with patient and prior medical records.      Clarke is a 67 year old male with PMH of DM type 2 with peripheral neuropathy, PVD and current foot ulcerations that was admitted to [facility] facility after hospitalization for fall associated with CP and SOB. He is going to be discharged home within the next couple of days. Podiatry was consulted to trim elongated toenails. Clarke relates that he has lymphedema wraps placed daily which help to reduce swelling adequately. He has one open lesion on right heel that is being dressed daily by nursing with MediHoney. He wears DH shoes daily (on both feet) to off-load the wound and other pressure areas. After discharge, he has plans to appoint with his PCP for new diabetic shoes and inserts. He is interested in setting up an outpatient podiatry appointment for routine care. Also admits numbness and tingling to both feet from toes to balls of feet. Denies electric or burning pains, severe swelling, n/v, fever, chills, CP, SOB, HA.           Past Medical History:     Past Medical History:   Diagnosis Date     Basal cell carcinoma              Past Surgical History:     Past Surgical History:   Procedure Laterality Date     BIOPSY OF SKIN LESION       MOHS  "MICROGRAPHIC PROCEDURE       PICC INSERTION Right 09/24/2017    5fr TL Bard PICC, 51cm (1cm external) in the R medial brachial vein w/ tip in the SVC.             Social History:     Social History     Social History     Marital status: Single     Spouse name: N/A     Number of children: N/A     Years of education: N/A     Social History Main Topics     Smoking status: Never Smoker     Smokeless tobacco: Never Used     Alcohol use No      Comment: Former alcohol abuse. Quit 70s then quit later in 80s-present     Drug use: No      Comment: \"Nothing these days can compare to the LSD in the 70s\"     Sexual activity: Not on file     Other Topics Concern     Not on file     Social History Narrative    Lives in an apartment in 16th floor near hospital.  Has elevators, unable to use stairs. Not able to shop; has things delivered to him including food. Difficulty completing cleaning. Goes to PCP once yearly for annual check up and medication review.             Family History:     Family History   Problem Relation Age of Onset     Depression Mother      CANCER No family hx of      No family history of skin cancer              Medications:     Current Facility-Administered Medications   Medication     [Rx hold ] furosemide (LASIX) tablet 40 mg     tamsulosin (FLOMAX) capsule 0.4 mg     metoprolol (TOPROL-XL) 24 hr tablet 100 mg     zinc sulfate (ZINCATE) capsule 220 mg     vitamin A capsule 20,000 Units     ascorbic acid (VITAMIN C) tablet 500 mg     potassium chloride SA (K-DUR/KLOR-CON M) CR tablet 40 mEq     potassium chloride SA (K-DUR/KLOR-CON M) CR tablet 20-40 mEq     lidocaine 1 % 1 mL     lidocaine (LMX4) kit     sodium chloride (PF) 0.9% PF flush 10-20 mL     heparin lock flush 10 UNIT/ML injection 5-10 mL     heparin lock flush 10 UNIT/ML injection 5-10 mL     aspirin chewable tablet 81 mg     atorvastatin (LIPITOR) tablet 40 mg     ipratropium - albuterol 0.5 mg/2.5 mg/3 mL (DUONEB) neb solution 3 mL     " levothyroxine (SYNTHROID/LEVOTHROID) tablet 150 mcg     metFORMIN (GLUCOPHAGE) tablet 500 mg     multivitamin, therapeutic with minerals (THERA-VIT-M) tablet 1 tablet     fish oil-omega-3 fatty acids capsule 1 g     medication instructions     acetaminophen (TYLENOL) tablet 650 mg     acetaminophen (TYLENOL) Suppository 650 mg     Patient is already receiving anticoagulation with heparin, enoxaparin (LOVENOX), warfarin (COUMADIN)  or other anticoagulant medication     tuberculin injection 5 Units     - Skin Test Reading -     alum & mag hydroxide-simethicone (MYLANTA ES/MAALOX  ES) suspension 30 mL     sennosides (SENOKOT) tablet 1-2 tablet     ondansetron (ZOFRAN-ODT) ODT tab 4 mg    Or     ondansetron (ZOFRAN) injection 4 mg     potassium chloride (KLOR-CON) Packet 20-40 mEq     potassium chloride 10 mEq in 100 mL intermittent infusion     potassium chloride 10 mEq in 100 mL intermittent infusion with 10 mg lidocaine     potassium chloride 20 mEq in 100 mL NON-STANDARD CONC intermittent infusion     magnesium sulfate 4 g in 100 mL sterile water (premade)     Warfarin Therapy Reminder (Check START DATE - warfarin may be starting in the FUTURE)             Allergies:    No Known Allergies         Review of Systems:   As mentioned in HPI          Physical Exam:   COMPLETE EXAMINATION:   VITAL SIGNS: /71 (BP Location: Left arm)  Pulse 102  Temp 97  F (36.1  C) (Oral)  Resp 18  Ht 1.829 m (6')  Wt (!) 177.6 kg (391 lb 8 oz)  SpO2 96%  BMI 53.1 kg/m2  General: Very pleasant 67 year old male is awake, alert, cooperative and in NAD.    Lymphedema wraps in place and not removed for physical exam. Pulses could not be assessed. Patient reports one wound on right heel which was not examined. No open lesions on exposed forefeet or toes. Skin is normal in color, turgor and texture. Absent sensation with 10 gram monofilament to toes and forefeet BL. Toenails are pincer type, elongated and brittle.           Data:    All pertinent laboratory data reviewed  All imaging studies reviewed by me.    Recent Labs   Lab Test  10/23/17   0708  10/19/17   0813  10/16/17   0624   HGB  15.7  15.4  15.9   WBC  5.5  5.9  5.4     No results for input(s): FTYP, FNEU, FOTH, FCOL, FAPR, FWBC in the last 41312 hours.    Signed:    Paula Emery PA-C  [phone]

## 2017-10-25 NOTE — PLAN OF CARE
Problem: Goal Outcome Summary  Goal: Goal Outcome Summary  Outcome: Improving  Patient sleeping and appeared to be in no apparent distress, C-pap in use during this shift. Patient awake at 0500, BP was 101/71, he denied pain, shortness of breath and dizziness. Urinal at bedside and patient able to use independently. Patient hoping to discharge home today, PICC line still in and flushed without difficulty. Continue plan of care

## 2017-10-25 NOTE — PLAN OF CARE
Problem: TCU Patient Goals  Goal: TCU Plan of Care  Outcome: No Change  Pt alert and oriented x 4, pleasant and cooperative with cares. Able to make needs known and used call light appropriately. Lymphedema wraps in place, clean, dry, intact.  No complaints of any pain. Pt was orthostatic this am but pt is asymptomatic. Healthcare provider aware. MD Mcneal verbalized to hold discharge today, will continue to monitor pt then for possible d/c tomorrow. Orthostatic blood pressure this afternoon is not orthostatic without any binder on to abdomen yet.     Vital signs:  Temp: 97  F (36.1  C) Temp src: Oral BP: 113/60 Pulse: 91 Heart Rate: 114 Resp: 16 SpO2: 96 % O2 Device: None (Room air)

## 2017-10-26 LAB
ANION GAP SERPL CALCULATED.3IONS-SCNC: 7 MMOL/L (ref 3–14)
BUN SERPL-MCNC: 10 MG/DL (ref 7–30)
CALCIUM SERPL-MCNC: 8.8 MG/DL (ref 8.5–10.1)
CHLORIDE SERPL-SCNC: 105 MMOL/L (ref 94–109)
CO2 SERPL-SCNC: 27 MMOL/L (ref 20–32)
CREAT SERPL-MCNC: 0.65 MG/DL (ref 0.66–1.25)
ERYTHROCYTE [DISTWIDTH] IN BLOOD BY AUTOMATED COUNT: 14.3 % (ref 10–15)
GFR SERPL CREATININE-BSD FRML MDRD: >90 ML/MIN/1.7M2
GLUCOSE BLDC GLUCOMTR-MCNC: 105 MG/DL (ref 70–99)
GLUCOSE BLDC GLUCOMTR-MCNC: 117 MG/DL (ref 70–99)
GLUCOSE SERPL-MCNC: 102 MG/DL (ref 70–99)
HCT VFR BLD AUTO: 47.4 % (ref 40–53)
HGB BLD-MCNC: 15.1 G/DL (ref 13.3–17.7)
INR PPP: 2.33 (ref 0.86–1.14)
MAGNESIUM SERPL-MCNC: 2 MG/DL (ref 1.6–2.3)
MCH RBC QN AUTO: 29.4 PG (ref 26.5–33)
MCHC RBC AUTO-ENTMCNC: 31.9 G/DL (ref 31.5–36.5)
MCV RBC AUTO: 92 FL (ref 78–100)
PLATELET # BLD AUTO: 171 10E9/L (ref 150–450)
POTASSIUM SERPL-SCNC: 4.3 MMOL/L (ref 3.4–5.3)
RBC # BLD AUTO: 5.13 10E12/L (ref 4.4–5.9)
SODIUM SERPL-SCNC: 139 MMOL/L (ref 133–144)
WBC # BLD AUTO: 5.7 10E9/L (ref 4–11)

## 2017-10-26 PROCEDURE — 36592 COLLECT BLOOD FROM PICC: CPT | Performed by: INTERNAL MEDICINE

## 2017-10-26 PROCEDURE — 85610 PROTHROMBIN TIME: CPT | Performed by: INTERNAL MEDICINE

## 2017-10-26 PROCEDURE — 25000128 H RX IP 250 OP 636: Performed by: INTERNAL MEDICINE

## 2017-10-26 PROCEDURE — 99309 SBSQ NF CARE MODERATE MDM 30: CPT | Performed by: INTERNAL MEDICINE

## 2017-10-26 PROCEDURE — 00000146 ZZHCL STATISTIC GLUCOSE BY METER IP

## 2017-10-26 PROCEDURE — 99207 ZZC CDG-MDM COMPONENT: MEETS MODERATE - UP CODED: CPT | Performed by: INTERNAL MEDICINE

## 2017-10-26 PROCEDURE — 80048 BASIC METABOLIC PNL TOTAL CA: CPT | Performed by: INTERNAL MEDICINE

## 2017-10-26 PROCEDURE — 25000132 ZZH RX MED GY IP 250 OP 250 PS 637: Performed by: INTERNAL MEDICINE

## 2017-10-26 PROCEDURE — 83735 ASSAY OF MAGNESIUM: CPT | Performed by: INTERNAL MEDICINE

## 2017-10-26 PROCEDURE — 12000022 ZZH R&B SNF

## 2017-10-26 PROCEDURE — 85027 COMPLETE CBC AUTOMATED: CPT | Performed by: INTERNAL MEDICINE

## 2017-10-26 RX ORDER — DOCUSATE SODIUM 100 MG/1
100 CAPSULE, LIQUID FILLED ORAL 2 TIMES DAILY
Status: DISCONTINUED | OUTPATIENT
Start: 2017-10-26 | End: 2017-10-27 | Stop reason: HOSPADM

## 2017-10-26 RX ORDER — WARFARIN SODIUM 5 MG/1
10 TABLET ORAL
Status: COMPLETED | OUTPATIENT
Start: 2017-10-26 | End: 2017-10-26

## 2017-10-26 RX ORDER — SENNOSIDES 8.6 MG
2 TABLET ORAL 2 TIMES DAILY
Status: DISCONTINUED | OUTPATIENT
Start: 2017-10-26 | End: 2017-10-27 | Stop reason: HOSPADM

## 2017-10-26 RX ORDER — POLYETHYLENE GLYCOL 3350 17 G/17G
17 POWDER, FOR SOLUTION ORAL DAILY
Status: DISCONTINUED | OUTPATIENT
Start: 2017-10-26 | End: 2017-10-27 | Stop reason: HOSPADM

## 2017-10-26 RX ADMIN — TAMSULOSIN HYDROCHLORIDE 0.4 MG: 0.4 CAPSULE ORAL at 08:33

## 2017-10-26 RX ADMIN — METFORMIN HYDROCHLORIDE 500 MG: 500 TABLET ORAL at 17:55

## 2017-10-26 RX ADMIN — MULTIPLE VITAMINS W/ MINERALS TAB 1 TABLET: TAB at 08:33

## 2017-10-26 RX ADMIN — POLYETHYLENE GLYCOL 3350 17 G: 17 POWDER, FOR SOLUTION ORAL at 12:16

## 2017-10-26 RX ADMIN — METOPROLOL SUCCINATE 100 MG: 100 TABLET, EXTENDED RELEASE ORAL at 08:34

## 2017-10-26 RX ADMIN — POTASSIUM CHLORIDE 40 MEQ: 1500 TABLET, EXTENDED RELEASE ORAL at 08:33

## 2017-10-26 RX ADMIN — ZINC SULFATE CAP 220 MG (50 MG ELEMENTAL ZN) 220 MG: 220 (50 ZN) CAP at 08:32

## 2017-10-26 RX ADMIN — OXYCODONE HYDROCHLORIDE AND ACETAMINOPHEN 500 MG: 500 TABLET ORAL at 08:33

## 2017-10-26 RX ADMIN — Medication 20000 UNITS: at 08:33

## 2017-10-26 RX ADMIN — ATORVASTATIN CALCIUM 40 MG: 40 TABLET, FILM COATED ORAL at 08:33

## 2017-10-26 RX ADMIN — SODIUM CHLORIDE, PRESERVATIVE FREE 10 ML: 5 INJECTION INTRAVENOUS at 05:53

## 2017-10-26 RX ADMIN — SODIUM CHLORIDE, PRESERVATIVE FREE 5 ML: 5 INJECTION INTRAVENOUS at 07:37

## 2017-10-26 RX ADMIN — POTASSIUM CHLORIDE 40 MEQ: 1500 TABLET, EXTENDED RELEASE ORAL at 19:52

## 2017-10-26 RX ADMIN — DOCUSATE SODIUM 100 MG: 100 CAPSULE, LIQUID FILLED ORAL at 12:16

## 2017-10-26 RX ADMIN — METFORMIN HYDROCHLORIDE 500 MG: 500 TABLET ORAL at 08:32

## 2017-10-26 RX ADMIN — WARFARIN SODIUM 10 MG: 5 TABLET ORAL at 17:54

## 2017-10-26 RX ADMIN — Medication 1 G: at 08:33

## 2017-10-26 RX ADMIN — ASPIRIN 81 MG CHEWABLE TABLET 81 MG: 81 TABLET CHEWABLE at 08:33

## 2017-10-26 RX ADMIN — LEVOTHYROXINE SODIUM 150 MCG: 100 TABLET ORAL at 05:53

## 2017-10-26 NOTE — PLAN OF CARE
Problem: TCU Patient Goals  Goal: TCU Plan of Care  Outcome: No Change  Pt is alert and oriented x 4, pleasant and cooperative with cares. Able to make needs known and used call light appropriately. Lymphedema wraps in place, clean, dry, intact.  No complaints of any pain. MD seen pt and make orders. Last BM was 10/23. Docusate and Miralax given as ordered. Prune juice given. MD verbalized if meds is not effective for BM for possible AXR.  Orthostatic blood pressure this afternoon is not orthostatic. Pt refused to wear his binder. MD Mcneal made aware have an order for PICC line remove before discharge. Will cont to monitor pt and will update healthcare provider as ordered.   Vital signs:  Temp: 97.6  F (36.4  C) Temp src: Oral BP: 103/66 Pulse: 100   Resp: 18 SpO2: 92 % O2 Device: None (Room air)     PT verbalized having 3 bowel movement (hard-soft to loose stool) after the prune juice and bowel medications.

## 2017-10-26 NOTE — PROGRESS NOTES
Pt denies dizziness or lightheadedness. Up in chair much of the shift. Taking fluids well. Appetite good. Ambulated with walker without problems. Abd binder ordered.

## 2017-10-26 NOTE — PROGRESS NOTES
[facility], [place]   Hospitalist Daily Progress Note                                                 Date of Admission:10/8/2017  ___________________________________  INTERVAL HISTORY (24 Hrs)/SUBJECTIVE:   Last 24 hr events, care team notes reviewed.     Doing better  Orthostatic vitals improving.   Claims drinking better.     denies LH or dizziness or cp or sob  Says still urinating a lot.   No other concern        ROS: 4 point ROS neg other than the symptoms noted above in the interval history.    OBJECTIVE :   VITALS:    Temp:  [97.6  F (36.4  C)-98.4  F (36.9  C)] 98.4  F (36.9  C)  Pulse:  [100-101] 101  Resp:  [17-18] 17  BP: (103-130)/(66-73) 130/73  SpO2:  [92 %-94 %] 94 %    Temp (24hrs), Av  F (36.7  C), Min:97.6  F (36.4  C), Max:98.4  F (36.9  C)      Wt Readings from Last 5 Encounters:   10/25/17 (!) 177.8 kg (392 lb)   10/07/17 (!) 184 kg (405 lb 10.3 oz)   17 (!) 191 kg (421 lb)   16 (!) 187.3 kg (413 lb)   16 (!) 187.4 kg (413 lb 3.2 oz)          Intake/Output Summary (Last 24 hours) at 10/26/17 1753  Last data filed at 10/26/17 1500   Gross per 24 hour   Intake                0 ml   Output             2325 ml   Net            -2325 ml         PHYSICAL EXAM:  General: alert, interactive, NAD, obese.   HEENT: AT/NC, Moist MM  Respi/Chest: Non labored. Clear bl  CVS/Heart: S1S2 no murmur. bl pedal edema on ace wraps  Gi/Abd: Soft, non tender, non distended, obese  MSK/Ext: Distal pulses 2+.     Neuro: AO x 4     Medications:   I have reviewed this patient's current medications.      Data:   All laboratory and imaging data in the past 24 hours reviewed:    LABS:  CMP    Recent Labs  Lab 10/26/17  0742 10/25/17  0844 10/23/17  0708    134 139   POTASSIUM 4.3 4.2 4.2   CHLORIDE 105 101 104   CO2 27 28 29   ANIONGAP 7 5 6   * 150* 106*   BUN 10 9 9   CR 0.65* 0.67 0.69   GFRESTIMATED >90 >90 >90   GFRESTBLACK >90 >90 >90   CHERI 8.8 8.8 8.8    MAG 2.0  --  1.9     CBC    Recent Labs  Lab 10/26/17  0742 10/25/17  0844 10/23/17  0708   WBC 5.7 6.0 5.5   RBC 5.13 5.23 5.27   HGB 15.1 15.5 15.7   HCT 47.4 49.1 49.2   MCV 92 94 93   MCH 29.4 29.6 29.8   MCHC 31.9 31.6 31.9   RDW 14.3 14.4 14.2    188 182     INR    Recent Labs  Lab 10/26/17  0742 10/24/17  0801 10/23/17  0708 10/22/17  0551   INR 2.33* 2.39* 2.48* 2.85*     Unresulted Labs Ordered in the Past 30 Days of this Admission     Date and Time Order Name Status Description    9/24/2017 0430 CBC with platelets In process     9/24/2017 0430 Basic metabolic panel In process           No results found for this or any previous visit (from the past 24 hour(s)).      ASSESSMENT & PLAN :    67 year old male with a history notable for morbid obesity, atrial fibrillation, HFrEF, diabetes, PVD, and untreated depression that was admitted following a fall and CP/SOB on 9/23. Hospital course complicated by  AMS with acute hypoxic/hypercarbic respiratory failure and hypotension requiring initiation of BiPAP and pressors. Transferred to  TCU on 10/09 for ongoing rehabilitation    # Orthostatic hypotension:   Likely related to dehydration 2/2 diuretics, fluid restriction.   Improving as we held furosemide and lisinopril.   Clinically asymptomatic today.   No cp or sob or LH or palpitations.     Continue to hold lisinopril, furosemide for now  Dc fluid restriction for now. liberalize fluid intake, patient counseled.   Ct metoprolol.   Daily orthostatic vitals  Non pharmacological ortho precautions: keep hydrated, head elevation by 20 degree. Slow rise in am. abd binder. Ct ace wrap to LE.     Fall precautions.   I/o, daily wt    # Abd cramps, distention: ? Constipation.   - increased senna 2 tab bid. Colace 100 bid.   - added miralax daily    # other issues as prior.     Dispo: hold dc given ongoing orthostatic hypotension and risk for fall. Lives alone.   D/w SW.   Will request home health care to monitor  his bp, wt few times a week.     D/w RN,        Oswald Mcneal MD   Hospitalist (Internal Medicine)  Pager: [phone].

## 2017-10-26 NOTE — PHARMACY
Clinical Pharmacy- Warfarin Discharge Note  This patient is currently on warfarin for the treatment of Atrial fibrillation.  INR Goal= 2-3  Expected length of therapy undetermined.    Anticoagulation Dose History     Recent Dosing and Labs Latest Ref Rng & Units 10/20/2017 10/21/2017 10/22/2017 10/23/2017 10/24/2017 10/25/2017 10/26/2017    ZZ IMS TEMPLATE - 12.5 mg - - 12.5 mg - 12.5 mg -    Warfarin 5 mg - - 10 mg 10 mg - 10 mg - -    INR 0.86 - 1.14 2.69(H) 2.96(H) 2.85(H) 2.48(H) 2.39(H) - 2.33(H)          Vitamin K doses administered during the last 7 days: none  FFP administered during the last 7 days: none  Recommend discharging the patient on a warfarin regimen of 12.5  mg on Monday, Wednesday, Friday and Sunday and 10 mg on Tuesday, Thursday and Saturday with a prescription for warfarin 5mg tablets.      The patient should have an INR checked by 11/1/17.     Cynthia Le, MirtaD, BCPS

## 2017-10-26 NOTE — PLAN OF CARE
Problem: Goal Outcome Summary  Goal: Goal Outcome Summary  Outcome: No Change  Pt.sleeping off and on with cpap in use most of the night. Denies pain or discomfort. No c/o's CP/SOB. BLE lymphedema wraps intact. Using urinal indep.-staff empties. Ancelmo in room with walker.

## 2017-10-26 NOTE — DISCHARGE INSTRUCTIONS
[facility] RN to check INR and coordinate with [facility] Pharm D (under PCP Matthias Sanchez) for Coumadin dosing management [phone]. The patient should have an INR checked on Monday 10/30/17    Right plantar heel wound: wash every other day with wound cleanser and gauze. Apply Medihoney to wound base and cover with Mepilex 4x4.  Left dorsal foot wound: wash every other day with wound cleanser and gauze. Cover with Adaptic followed by Optifoam under lymphedema wraps.  Bilateral lower legs: wash with soap and water and wash cloth prior to lymphedema wrap placement. Moisturize freely with Sween 24.  At home: may use over the counter lotions for dry skin in place of sween cream: vanicream (or vanicream lite), aquaphor ointment for open areas.  Avoid lotions with lanolin in the ingredients.

## 2017-10-26 NOTE — PROGRESS NOTES
PT WAS SET UP AT BEDSIDE ON 10/24/17 WITH THE RESMED AIRCURVE S10 BIPAP ST.  PT DEMONSTRATED UNDERSTANDING OF THE BIPAP, CLEANING AND NECESSITY OF WEARING THE BIPAP.  SETTINGS ARE IPAP 12 EPAP 5 WITH RR 16.  PT CHOSE A FULL FACE MASK AIRFIT F20 LARGE.  PT WAS GIVEN INFORMATION ON HOW TO CONTACT MYSELF OR [facility] DIRECTLY,  PT WILL BE CONTACTED AFTER DISCHARGE FROM THE TCU.  TOBY MORATAYA RRT  [facility]  [phone]

## 2017-10-27 VITALS
HEIGHT: 72 IN | TEMPERATURE: 99.2 F | SYSTOLIC BLOOD PRESSURE: 120 MMHG | RESPIRATION RATE: 17 BRPM | OXYGEN SATURATION: 94 % | BODY MASS INDEX: 42.66 KG/M2 | HEART RATE: 91 BPM | WEIGHT: 315 LBS | DIASTOLIC BLOOD PRESSURE: 69 MMHG

## 2017-10-27 LAB
ANION GAP SERPL CALCULATED.3IONS-SCNC: 5 MMOL/L (ref 3–14)
BUN SERPL-MCNC: 9 MG/DL (ref 7–30)
CALCIUM SERPL-MCNC: 9 MG/DL (ref 8.5–10.1)
CHLORIDE SERPL-SCNC: 104 MMOL/L (ref 94–109)
CO2 SERPL-SCNC: 29 MMOL/L (ref 20–32)
CREAT SERPL-MCNC: 0.62 MG/DL (ref 0.66–1.25)
GFR SERPL CREATININE-BSD FRML MDRD: >90 ML/MIN/1.7M2
GLUCOSE BLDC GLUCOMTR-MCNC: 103 MG/DL (ref 70–99)
GLUCOSE SERPL-MCNC: 135 MG/DL (ref 70–99)
POTASSIUM SERPL-SCNC: 4.2 MMOL/L (ref 3.4–5.3)
SODIUM SERPL-SCNC: 138 MMOL/L (ref 133–144)

## 2017-10-27 PROCEDURE — 25000132 ZZH RX MED GY IP 250 OP 250 PS 637: Performed by: INTERNAL MEDICINE

## 2017-10-27 PROCEDURE — 97535 SELF CARE MNGMENT TRAINING: CPT | Mod: GP | Performed by: PHYSICAL THERAPIST

## 2017-10-27 PROCEDURE — 99316 NF DSCHRG MGMT 30 MIN+: CPT | Performed by: INTERNAL MEDICINE

## 2017-10-27 PROCEDURE — 97140 MANUAL THERAPY 1/> REGIONS: CPT | Mod: GP | Performed by: PHYSICAL THERAPIST

## 2017-10-27 PROCEDURE — 80048 BASIC METABOLIC PNL TOTAL CA: CPT | Performed by: INTERNAL MEDICINE

## 2017-10-27 PROCEDURE — 36592 COLLECT BLOOD FROM PICC: CPT | Performed by: INTERNAL MEDICINE

## 2017-10-27 PROCEDURE — 00000146 ZZHCL STATISTIC GLUCOSE BY METER IP

## 2017-10-27 PROCEDURE — 40000193 ZZH STATISTIC PT WARD VISIT: Performed by: PHYSICAL THERAPIST

## 2017-10-27 PROCEDURE — 25000128 H RX IP 250 OP 636: Performed by: INTERNAL MEDICINE

## 2017-10-27 RX ORDER — METOPROLOL SUCCINATE 100 MG/1
100 TABLET, EXTENDED RELEASE ORAL DAILY
Qty: 30 TABLET | Refills: 0 | Status: SHIPPED | OUTPATIENT
Start: 2017-10-27 | End: 2017-11-10

## 2017-10-27 RX ORDER — LISINOPRIL 5 MG/1
2.5 TABLET ORAL DAILY
Qty: 30 TABLET | Refills: 0 | Status: SHIPPED | OUTPATIENT
Start: 2017-10-27 | End: 2017-11-10

## 2017-10-27 RX ORDER — WARFARIN SODIUM 5 MG/1
12.5 TABLET ORAL DAILY
Qty: 30 TABLET | Refills: 0 | Status: SHIPPED | OUTPATIENT
Start: 2017-10-27 | End: 2017-11-10

## 2017-10-27 RX ORDER — POTASSIUM CHLORIDE 1500 MG/1
40 TABLET, EXTENDED RELEASE ORAL DAILY
Qty: 30 TABLET | Refills: 0 | Status: SHIPPED | OUTPATIENT
Start: 2017-10-27 | End: 2017-11-10

## 2017-10-27 RX ORDER — AMOXICILLIN 250 MG
1-2 CAPSULE ORAL 2 TIMES DAILY PRN
Qty: 60 TABLET | Refills: 1 | Status: SHIPPED | OUTPATIENT
Start: 2017-10-27 | End: 2017-11-10

## 2017-10-27 RX ORDER — LISINOPRIL 2.5 MG/1
2.5 TABLET ORAL DAILY
Status: DISCONTINUED | OUTPATIENT
Start: 2017-10-27 | End: 2017-10-27 | Stop reason: HOSPADM

## 2017-10-27 RX ORDER — POLYETHYLENE GLYCOL 3350 17 G/17G
17 POWDER, FOR SOLUTION ORAL DAILY PRN
Qty: 15 PACKET | Refills: 0 | Status: SHIPPED | OUTPATIENT
Start: 2017-10-27 | End: 2017-11-10

## 2017-10-27 RX ORDER — FUROSEMIDE 20 MG
20 TABLET ORAL DAILY
Qty: 30 TABLET | Refills: 0 | Status: SHIPPED | OUTPATIENT
Start: 2017-10-28 | End: 2017-10-30

## 2017-10-27 RX ADMIN — SENNOSIDES 2 TABLET: 8.6 TABLET, FILM COATED ORAL at 08:14

## 2017-10-27 RX ADMIN — POTASSIUM CHLORIDE 40 MEQ: 1500 TABLET, EXTENDED RELEASE ORAL at 08:14

## 2017-10-27 RX ADMIN — Medication 1 G: at 08:13

## 2017-10-27 RX ADMIN — LEVOTHYROXINE SODIUM 150 MCG: 100 TABLET ORAL at 06:56

## 2017-10-27 RX ADMIN — TAMSULOSIN HYDROCHLORIDE 0.4 MG: 0.4 CAPSULE ORAL at 08:14

## 2017-10-27 RX ADMIN — SODIUM CHLORIDE, PRESERVATIVE FREE 5 ML: 5 INJECTION INTRAVENOUS at 09:35

## 2017-10-27 RX ADMIN — METFORMIN HYDROCHLORIDE 500 MG: 500 TABLET ORAL at 08:13

## 2017-10-27 RX ADMIN — ASPIRIN 81 MG CHEWABLE TABLET 81 MG: 81 TABLET CHEWABLE at 08:15

## 2017-10-27 RX ADMIN — Medication 20000 UNITS: at 08:14

## 2017-10-27 RX ADMIN — LISINOPRIL 2.5 MG: 2.5 TABLET ORAL at 12:03

## 2017-10-27 RX ADMIN — OXYCODONE HYDROCHLORIDE AND ACETAMINOPHEN 500 MG: 500 TABLET ORAL at 08:13

## 2017-10-27 RX ADMIN — ATORVASTATIN CALCIUM 40 MG: 40 TABLET, FILM COATED ORAL at 08:15

## 2017-10-27 RX ADMIN — MULTIPLE VITAMINS W/ MINERALS TAB 1 TABLET: TAB at 08:13

## 2017-10-27 RX ADMIN — METOPROLOL SUCCINATE 100 MG: 100 TABLET, EXTENDED RELEASE ORAL at 08:12

## 2017-10-27 RX ADMIN — ZINC SULFATE CAP 220 MG (50 MG ELEMENTAL ZN) 220 MG: 220 (50 ZN) CAP at 08:15

## 2017-10-27 NOTE — PLAN OF CARE
Problem: Goal Outcome Summary  Goal: Goal Outcome Summary  Pt alert and oriented. Independent in room. Denies any shortness of breath. Had 3 BMs today. Uses urinal at bedside. Lymphedema wraps removed and skin care done, rewrapped. Dressing to right heel clean dry and intact. Due to be changed tomorrow. Using CPAP independently. Pt had no new concerns. Plans to discharge home tomorrow.

## 2017-10-27 NOTE — PROGRESS NOTES
"SPIRITUAL HEALTH SERVICES  SPIRITUAL ASSESSMENT Progress Note  [facility] ([facility] [place]CU     REFERRAL SOURCE: Follow Up     Visited with Clarke, who said often that he was thankful for our initial  visit and for taking the time to visit this evening.  Clarke stated that he has been told that he will be discharged tomorrow. He said he is much less apprehensive about that fact than he was a couple of weeks ago.  He shared that because of the caring and loving nature of the care team, including me, he has been able to be more \"present\" when with others (he mentioned 's and Administrators) when they provide him with important information.  He expressed that he is more willing to be open with others as he returns to his apartment community.  He shared that he feels a little less fearful of being found \"unfit\" or \"unaccepted\" as he is about to leave TCU compared to when he arrived in the ER.  [place] thanksgiving was shared between Clarke and I as we shook hands and I departed.    PLAN: No future visits planned as Clarke stated he will be discharged tomorrow.    Jt Almazan   Intern  Pager [phone]    "

## 2017-10-27 NOTE — PLAN OF CARE
Problem: Goal Outcome Summary  Goal: Goal Outcome Summary  Outcome: No Change  RN: Pt has no c/o pain or discomfort this shift. Reported BLE tingling and numbness but not new. Lymphedema wraps in place. Up independently in the room with walker but uses urinal at night. Tolerating CPAP. Appear to be sleeping/resting during rounds. For d/c today. Continue with plan of care.

## 2017-10-27 NOTE — PLAN OF CARE
Problem: Goal Outcome Summary  Goal: Goal Outcome Summary  Outcome: Adequate for Discharge Date Met:  10/27/17  Pt was discharged home today. All discharge medication and instruction were reviewed with pt. Sitting in w/c escorted down stairs and transferred to car safely.

## 2017-10-28 ENCOUNTER — TELEPHONE (OUTPATIENT)
Dept: FAMILY MEDICINE | Facility: CLINIC | Age: 67
End: 2017-10-28

## 2017-10-28 ENCOUNTER — MEDICAL CORRESPONDENCE (OUTPATIENT)
Dept: HEALTH INFORMATION MANAGEMENT | Facility: CLINIC | Age: 67
End: 2017-10-28

## 2017-10-28 NOTE — TELEPHONE ENCOUNTER
Received a page from Stanley Gandhi ([facility]) for an order for home physical and occupational therapy consult for patient. He was discharged yesterday.  Verbal order was given after chart review. PCP Dr. Sanchez to receive additional documentation.     Jerry Miller. MD  PGY3 [facility] [place] Family Medicine Resident   Pager: [phone]

## 2017-10-30 ENCOUNTER — TELEPHONE (OUTPATIENT)
Dept: FAMILY MEDICINE | Facility: CLINIC | Age: 67
End: 2017-10-30

## 2017-10-30 DIAGNOSIS — E87.70 HYPERVOLEMIA, UNSPECIFIED HYPERVOLEMIA TYPE: ICD-10-CM

## 2017-10-30 DIAGNOSIS — R33.9 URINARY RETENTION: ICD-10-CM

## 2017-10-30 LAB — INR POINT OF CARE: 1.4 (ref 0.86–1.14)

## 2017-10-30 RX ORDER — TAMSULOSIN HYDROCHLORIDE 0.4 MG/1
0.4 CAPSULE ORAL DAILY
Qty: 30 CAPSULE | Refills: 1 | Status: SHIPPED | OUTPATIENT
Start: 2017-10-30 | End: 2017-11-10

## 2017-10-30 RX ORDER — FUROSEMIDE 20 MG
20 TABLET ORAL DAILY
Qty: 30 TABLET | Refills: 1 | Status: SHIPPED | OUTPATIENT
Start: 2017-10-30 | End: 2017-11-13

## 2017-10-30 NOTE — TELEPHONE ENCOUNTER
[facility] Family Medicine phone call message- patient requesting to speak with PCP or provider:    PCP: Matthias Sanchez    Additional Comments: Stanley called to inform PCP that Pt's INR is 1.4 and pt is home from rehab since 10/27/2017. She states he didn't take any coumadin since he came home. She states they have no script for Lasix and Flomax and want it to be sent to patient's pharmacy.     Is a call back needed? Yes    Patient informed that it may take up to 2 business days to hear back from PCP:Yes    OK to leave a message on voice mail? Yes    Primary language: English      needed? No    Call taken on October 30, 2017 at 10:19 AM by Sandhya Cha

## 2017-10-30 NOTE — TELEPHONE ENCOUNTER
Called patient  Coumadin being picked up today  He will start tomorrow    I sent in Rx for lasix and flomax    Has appt on 11/10 for TCM    PHarper

## 2017-10-30 NOTE — TELEPHONE ENCOUNTER
[facility] [[email or web address]] Family Medicine phone call message - order or referral request for patient:     Order or referral being requested: Glucometer and BP cuff      Additional Comments: Stanley called again to request Rx for Glucometer and BP cuff to be sent to pt's pharmacy.     OK to leave a message on voice mail? Yes    Primary language: English      needed? No    Call taken on October 30, 2017 at 12:50 PM by Sandhya Cha

## 2017-10-30 NOTE — TELEPHONE ENCOUNTER
Behavioral Health Home screening completed; Clarke is not eligible for the service, as he does not have Medical Assistance.  This writer attempted to reach out to [facility] to inquire about Care management, however was not successful in reaching them.  Lupe Anderson, YAAKOV [facility]

## 2017-11-01 NOTE — TELEPHONE ENCOUNTER
Stanley Blount from [facility] is waiting to hear back regarding INR result of 1.4 and updated coumadin orders. Per patient, PCP called him directly, but Home Care Nurse needs orders as well. Please call Stanley Blount to discuss. Thank you!

## 2017-11-02 ENCOUNTER — TELEPHONE (OUTPATIENT)
Dept: PHARMACY | Facility: CLINIC | Age: 67
End: 2017-11-02

## 2017-11-02 DIAGNOSIS — I48.19 PERSISTENT ATRIAL FIBRILLATION (H): Primary | ICD-10-CM

## 2017-11-02 NOTE — TELEPHONE ENCOUNTER
Patient, Clarke Moreira was referred to PharmD team for anticoagulation monitoring after TCU discharge by Dr. Matthias Sanchez.  [place] home [facility] nurse has called requesting warfarin dosing instructions.    Clinical Pharmacy Note  - Telephone encounter     Home care nurse calls today with a new INR result for Clarke Moreira  PCP:  Matthias Sanchez    Home Care nurse name: Stanley Blount, Callback [phone]    Stanley Blount states that Clarke needs his warfarin instructions.  She says that the patient has a bottle of pills, but does not know how to take the medication. He says he was not given instructions on his warfarin by his physician.  Stanley carter states that the warfarin was not on the discharge summary that [facility] had.    Hospital: 9/23/17 through 10/8/17  TCU: 10/8/17 through 10/27/17  Now at home    Current warfarin dose:     Patient was getting warfarin at TCU.  Per medication administration record in chart (administration in the evening ~6pm):  10/13: 10 mg  10/14: 10 mg  10/15: 10 mg  10/16: 12.5 mg  10/17: 12.5 mg  10/18: 12.5 mg  10/19: 10 mg  10/20: 12.5 mg  10/21: 10 mg  10/22: 10 mg  10/23: 12.5 mg  10/24: 10 mg  10/25: 12.5 mg  10/26: 10 mg  10/27: 12.5 mg  Discharge home -   10/28: no warfarin  Prescription was sent for patient at TCU discharge: warfarin 5 mg tablets: take 2.5 tablets by mouth daily.  Called and spoke with patient: Starting on 10/29 Clarke states that he had been taking warfarin 5 mg tablets and taking 1 tablet by mouth daily.  He does not realize that his warfarin is the same as coumadin.    INR:   10/19: 3  10/26: 2.33    Medications changes since the TCU discharge include (per discharge summary):   Started: docusate-senna, vitamin A, ascorbic acid, miralax.  Stopped: diltiazem  New concerns: Has not been taking warfarin daily.    INR today in the home:  1.4 on 10/30/17     Lab Results   Component Value Date    INR 2.33 10/26/2017    INR 2.39 10/24/2017    INR 2.48  "10/23/2017    INR 2.85 10/22/2017    INR 2.96 10/21/2017    INR 2.69 10/20/2017     Assessment and Plan:       Status:  uncontrolled       DTP:  Patient does not understand/dose too low  , DTP degree:2            Patient's INR on 10/30/17 at 1.4, not at goal of 2-3.  Patient has not been taking therapeutic dose of warfarin for ~1 week, since TCU discharge explaining low INR.  INR is likely even lower today, although nurse is not in home and INR has not been done today.  Recommend re-initiate therapeutic warfarin at historic dose that met goal.  Patient's INR at TCU was 2.33 on 10/26 with a total weekly dose of 77.5 mg.  Appropriate to re-initiate the total weekly dose of 77.5 mg - Mon/Wed/Fri: 12.5 mg and Tues/Thurs/Sat/Sun: 10 mg, but recommend to start today with a higher dose of 12.5 mg.    Educated patient that warfarin and coumadin are the same medication and he should start taking the prescribed dose that Stanley Blount sets up today, and stop taking the 1 tablet from the bottle.    There are no known drug interactions with the medication adjustments that occurred on TCU discharge.    New Warfarin Dose:  Thursday 11/2: 12.5mg *exception: note one time higher dose*   Friday 11/3: 12.5 mg  Saturday 11/4: 10 mg  Sunday 11/5: 10 mg  Monday 11/6: 12.5 mg  Tuesday 11/7: 10 mg  Wednesday 11/8: 12.5 mg  Thursday 11/9: 10 mg  Friday 11/10: 12.5 mg *Follow-up INR*    Follow up date: Friday, November 10, 2017.    Please see \"[facility] Anticoagulation Flowsheet\"     Home care nurse confirms order today - repeats verbally  Home care nurse will go to patient home today to set up warfarin.    Patient was called and requested to make PharmD follow-up appointment.  PharmD is not available in the AM before his visit with Dr. Sanchez and patient declines coming to clinic on another day.  Recommend INR follow-up at Dr. Sanchez's visit on 11/10/17.  Future INR order was entered.    All medications were reviewed and found to be indicated, " effective, safe and convenient unless drug therapy problems identified as described above.    Matthias Waller was provided our recommendations via chart route and Dr. Wegener, Joel was available for supervision during this visit and is the authorizing prescriber for this visit through the pharmacist collaborative practice agreement.    Marleny Bowers, Pharm.D

## 2017-11-02 NOTE — TELEPHONE ENCOUNTER
Stanley Blount from [facility] is waiting to hear back from someone at this clinic regarding the patient's coumadin. Patient was supposed to start taking medication on 10/31, but has not started it yet because he is unsure how to take the medication. Per Stanley Blount she knows the patient spoke with Dr. Sanchez, but he does not recall the medication instructions. Please call Stanley Blount to discuss Coumadin orders and INR draw frequency. Thank you!

## 2017-11-10 ENCOUNTER — OFFICE VISIT (OUTPATIENT)
Dept: FAMILY MEDICINE | Facility: CLINIC | Age: 67
End: 2017-11-10

## 2017-11-10 VITALS
SYSTOLIC BLOOD PRESSURE: 118 MMHG | DIASTOLIC BLOOD PRESSURE: 76 MMHG | BODY MASS INDEX: 58.62 KG/M2 | HEART RATE: 104 BPM | OXYGEN SATURATION: 95 % | RESPIRATION RATE: 16 BRPM | WEIGHT: 315 LBS | TEMPERATURE: 98.2 F

## 2017-11-10 DIAGNOSIS — Z99.89 BIPAP (BIPHASIC POSITIVE AIRWAY PRESSURE) DEPENDENCE: ICD-10-CM

## 2017-11-10 DIAGNOSIS — E11.65 TYPE 2 DIABETES MELLITUS WITH HYPERGLYCEMIA, WITHOUT LONG-TERM CURRENT USE OF INSULIN (H): ICD-10-CM

## 2017-11-10 DIAGNOSIS — E87.6 HYPOKALEMIA: ICD-10-CM

## 2017-11-10 DIAGNOSIS — I48.19 PERSISTENT ATRIAL FIBRILLATION (H): ICD-10-CM

## 2017-11-10 DIAGNOSIS — E78.5 HYPERLIPIDEMIA LDL GOAL <100: ICD-10-CM

## 2017-11-10 DIAGNOSIS — I10 ESSENTIAL HYPERTENSION WITH GOAL BLOOD PRESSURE LESS THAN 140/90: ICD-10-CM

## 2017-11-10 DIAGNOSIS — I87.8 VENOUS STASIS: ICD-10-CM

## 2017-11-10 DIAGNOSIS — I89.0 LYMPHEDEMA: ICD-10-CM

## 2017-11-10 DIAGNOSIS — E11.9 TYPE 2 DIABETES MELLITUS WITHOUT COMPLICATION, WITHOUT LONG-TERM CURRENT USE OF INSULIN (H): ICD-10-CM

## 2017-11-10 DIAGNOSIS — K59.00 CONSTIPATION, UNSPECIFIED CONSTIPATION TYPE: ICD-10-CM

## 2017-11-10 DIAGNOSIS — I50.22 CHRONIC SYSTOLIC CONGESTIVE HEART FAILURE (H): Primary | ICD-10-CM

## 2017-11-10 DIAGNOSIS — E03.9 HYPOTHYROIDISM, UNSPECIFIED TYPE: ICD-10-CM

## 2017-11-10 DIAGNOSIS — R33.9 URINARY RETENTION: ICD-10-CM

## 2017-11-10 LAB
% GRANULOCYTES: 74.8 %G (ref 40–75)
ALBUMIN SERPL-MCNC: 3.6 MG/DL (ref 3.5–4.7)
ALP SERPL-CCNC: 88.3 U/L (ref 31.7–110.7)
ALT SERPL-CCNC: 15.6 U/L (ref 0–45)
AST SERPL-CCNC: 14 U/L (ref 0–55)
BILIRUB SERPL-MCNC: 1 MG/DL (ref 0.2–1.3)
BILIRUBIN UR: NEGATIVE
BLOOD UR: NEGATIVE
BUN SERPL-MCNC: 3.3 MG/DL (ref 7–21)
CALCIUM SERPL-MCNC: 8.6 MG/DL (ref 8.5–10.1)
CHLORIDE SERPLBLD-SCNC: 96.6 MMOL/L (ref 98–110)
CO2 SERPL-SCNC: 26.8 MMOL/L (ref 20–32)
CREAT SERPL-MCNC: 0.7 MG/DL (ref 0.7–1.3)
CREAT UR-MCNC: 28 MG/DL
GLUCOSE SERPL-MCNC: 120.8 MG'DL (ref 70–99)
GLUCOSE URINE: NEGATIVE
GRANULOCYTES #: 5.5 K/UL (ref 1.6–8.3)
HCT VFR BLD AUTO: 47 % (ref 40–53)
HEMOGLOBIN: 14.6 G/DL (ref 13.3–17.7)
INR PPP: 2.7
KETONES UR QL: NEGATIVE
LEUKOCYTE ESTERASE UR: NEGATIVE
LYMPHOCYTES # BLD AUTO: 1.3 K/UL (ref 0.8–5.3)
LYMPHOCYTES NFR BLD AUTO: 18.1 %L (ref 20–48)
MCH RBC QN AUTO: 30.1 PG (ref 26.5–35)
MCHC RBC AUTO-ENTMCNC: 31.1 G/DL (ref 32–36)
MCV RBC AUTO: 97 FL (ref 78–100)
MICROALBUMIN UR-MCNC: 6 MG/L
MICROALBUMIN/CREAT UR: 19.89 MG/G CR (ref 0–17)
MID #: 0.5 K/UL (ref 0–2.2)
MID %: 7.1 %M (ref 0–20)
NITRITE UR QL STRIP: NEGATIVE
PH UR STRIP: 6 [PH] (ref 5–7)
PLATELET # BLD AUTO: 211 K/UL (ref 150–450)
POTASSIUM SERPL-SCNC: 3.7 MMOL/DL (ref 3.3–4.5)
PROT SERPL-MCNC: 6.7 G/DL (ref 6.8–8.8)
PROTEIN UR: NEGATIVE
RBC # BLD AUTO: 4.85 M/UL (ref 4.4–5.9)
SODIUM SERPL-SCNC: 136.7 MMOL/L (ref 132.6–141.4)
SP GR UR STRIP: <=1.005
T4 FREE SERPL-MCNC: 1.6 NG/DL (ref 0.76–1.46)
TSH SERPL DL<=0.005 MIU/L-ACNC: 4.89 MU/L (ref 0.4–4)
UROBILINOGEN UR STRIP-ACNC: NORMAL
WBC # BLD AUTO: 7.4 K/UL (ref 4–11)

## 2017-11-10 RX ORDER — LEVOTHYROXINE SODIUM 150 UG/1
150 TABLET ORAL DAILY
Qty: 30 TABLET | Refills: 11 | Status: ON HOLD | OUTPATIENT
Start: 2017-11-10 | End: 2018-01-08

## 2017-11-10 RX ORDER — METOPROLOL SUCCINATE 100 MG/1
100 TABLET, EXTENDED RELEASE ORAL DAILY
Qty: 30 TABLET | Refills: 11 | Status: SHIPPED | OUTPATIENT
Start: 2017-11-10 | End: 2018-12-03

## 2017-11-10 RX ORDER — WARFARIN SODIUM 5 MG/1
12.5 TABLET ORAL DAILY
Qty: 30 TABLET | Refills: 11 | Status: SHIPPED | OUTPATIENT
Start: 2017-11-10 | End: 2017-11-20

## 2017-11-10 RX ORDER — ZINC SULFATE 50(220)MG
220 CAPSULE ORAL DAILY
Qty: 30 CAPSULE | Refills: 11 | Status: SHIPPED | OUTPATIENT
Start: 2017-11-10 | End: 2017-12-04

## 2017-11-10 RX ORDER — OMEGA-3 FATTY ACIDS/FISH OIL 300-1000MG
1 CAPSULE ORAL DAILY
Qty: 30 CAPSULE | Refills: 11 | Status: SHIPPED | OUTPATIENT
Start: 2017-11-10 | End: 2018-12-03

## 2017-11-10 RX ORDER — POTASSIUM CHLORIDE 1500 MG/1
40 TABLET, EXTENDED RELEASE ORAL DAILY
Qty: 30 TABLET | Refills: 11 | Status: SHIPPED | OUTPATIENT
Start: 2017-11-10 | End: 2017-11-20

## 2017-11-10 RX ORDER — AMOXICILLIN 250 MG
1-2 CAPSULE ORAL 2 TIMES DAILY PRN
Qty: 60 TABLET | Refills: 11 | Status: SHIPPED | OUTPATIENT
Start: 2017-11-10 | End: 2017-11-13

## 2017-11-10 RX ORDER — POLYETHYLENE GLYCOL 3350 17 G/17G
17 POWDER, FOR SOLUTION ORAL DAILY PRN
Qty: 15 PACKET | Refills: 11 | Status: SHIPPED | OUTPATIENT
Start: 2017-11-10 | End: 2019-01-09

## 2017-11-10 RX ORDER — TAMSULOSIN HYDROCHLORIDE 0.4 MG/1
0.4 CAPSULE ORAL DAILY
Qty: 30 CAPSULE | Refills: 11 | Status: SHIPPED | OUTPATIENT
Start: 2017-11-10 | End: 2018-10-23

## 2017-11-10 RX ORDER — MULTIPLE VITAMINS W/ MINERALS TAB 9MG-400MCG
1 TAB ORAL DAILY
Qty: 30 EACH | Refills: 11 | Status: ON HOLD | OUTPATIENT
Start: 2017-11-10 | End: 2019-03-16

## 2017-11-10 RX ORDER — LISINOPRIL 5 MG/1
2.5 TABLET ORAL DAILY
Qty: 30 TABLET | Refills: 11 | Status: ON HOLD | OUTPATIENT
Start: 2017-11-10 | End: 2018-01-10

## 2017-11-10 NOTE — PATIENT INSTRUCTIONS
Oh so much to discuss    1. CHF clinic  2. Lymphedema clinic  3. Shoes - podiatry referral  4. Hospital bed  5. Sleep clinic referral  6. Housing paper - go slow  7. OK to use your vitamins  8. Continue with medications printed on this list    Next appointment with Luara - 1 month    Jack

## 2017-11-10 NOTE — LETTER
November 15, 2017      Clarke Moreira  [address]  [place] [id number]        Dear Clarke,    Thank you for getting your care at [facility]. Please see below for your test results.  In general, your labs are stable and improving from the hospital.  Good news!    Resulted Orders   INR ([facility])   Result Value Ref Range    INR 2.7       Comment:      Adult Normal Range: 0.90 - 1.10  Therapeutic Range: 2.0 - 3.5     Comprehensive Metabolic Panel ([facility])   Result Value Ref Range    Albumin 3.6 3.5 - 4.7 mg/dL    Alkaline Phosphatase 88.3 31.7 - 110.7 U/L    ALT 15.6 0.0 - 45.0 U/L    AST 14.0 0.0 - 55.0 U/L    Bilirubin Total 1.0 0.2 - 1.3 mg/dL    Urea Nitrogen 3.3 (L) 7.0 - 21.0 mg/dL    Calcium 8.6 8.5 - 10.1 mg/dL    Chloride 96.6 (L) 98.0 - 110.0 mmol/L    Carbon Dioxide 26.8 20.0 - 32.0 mmol/L    Creatinine 0.7 0.7 - 1.3 mg/dL    Glucose 120.8 (H) 70.0 - 99.0 mg'dL    Potassium 3.7 3.3 - 4.5 mmol/dL    Sodium 136.7 132.6 - 141.4 mmol/L    Protein Total 6.7 (L) 6.8 - 8.8 g/dL   CBC with Diff Plt ([facility])   Result Value Ref Range    WBC 7.4 4.0 - 11.0 K/uL    Lymphocytes # 1.3 0.8 - 5.3 K/uL    % Lymphocytes 18.1 (L) 20.0 - 48.0 %L    Mid # 0.5 0.0 - 2.2 K/uL    Mid % 7.1 0.0 - 20.0 %M    GRANULOCYTES # 5.5 1.6 - 8.3 K/uL    % Granulocytes 74.8 40.0 - 75.0 %G    RBC 4.85 4.40 - 5.90 M/uL    Hemoglobin 14.6 13.3 - 17.7 g/dL    Hematocrit 47.0 40.0 - 53.0 %    MCV 97.0 78.0 - 100.0 fL    MCH 30.1 26.5 - 35.0 pg    MCHC 31.1 (L) 32.0 - 36.0 g/dL    Platelets 211.0 150.0 - 450.0 K/uL   TSH with free T4 reflex   Result Value Ref Range    TSH 4.89 (H) 0.40 - 4.00 mU/L   Albumin Random Urine Quantitative with Creat Ratio   Result Value Ref Range    Creatinine Urine 28 mg/dL    Albumin Urine mg/L 6 mg/L    Albumin Urine mg/g Cr 19.89 (H) 0 - 17 mg/g Cr   Urinalysis, Micro If (UA) ([facility])   Result Value Ref Range    Specific Gravity Urine <=1.005 1.005 - 1.030    pH Urine 6.0 4.5 - 8.0     Leukocyte Esterase UR Negative NEGATIVE    Nitrite Urine Negative NEGATIVE    Protein UR Negative NEGATIVE    Glucose Urine Negative NEGATIVE    Ketones Urine Negative NEGATIVE    Urobilinogen mg/dL 0.2 E.U./dL 0.2 E.U./dL    Bilirubin UR Negative NEGATIVE    Blood UR Negative NEGATIVE   T4 free   Result Value Ref Range    T4 Free 1.60 (H) 0.76 - 1.46 ng/dL           Sincerely,    Matthias Sanchez MD

## 2017-11-10 NOTE — PROGRESS NOTES
Hospitalization Follow-up Visit         [facility]       Hospital Follow-up Visit:    Hospital:  [facility]   Date of Admission: 9/23/17  Date of Discharge: 10/08/17    TCU: [facility] ([facility])  Admission: 10/08/17  Discharge: 10/23/17      Reason(s) for Admission: Respiratory failure    Discharge Diagnoses:  Acute on chronic hypercapnic respiratory failure  Probable pulmonary hypertension  OHS  THONG  Atrial fibrillation with RVR  Metabolic encephalopathy secondary to hypercapnia  Pulmonary edema  ?HFpEF  Peripheral vascular disease  Venous stasis  Depression  Lower extremity cellulitis   Atypical chest pain  HTN  DM2  Hx of hypothyroidism      Discharge Dx from TCU  Physical Deconditioning  Morbid Obesity  Atrial Fibrillation  HFrEF  DM  PVD  LE edema.   Orthostatic hypotension.   Depression.              Problems taking medications regularly:  None       Post Discharge Medication Reconciliation: discharge medications reconciled, continue medications without change.       Problems adhering to non-medication therapy:  Leg wraps getting stretched out       Medications reviewed by: by myself. Findings include still has HCTZ on home meds. HCTZ d/c'd.    Summary of hospitalization:  [facility] discharge summary reviewed  Diagnostic Tests/Treatments reviewed.  Follow up needed: CHF Clinic, Sleep Clinic, Lymphedema, Podiatry referral, Hospital bed,     Other Healthcare Providers Involved in Patient s Care:         Homecare  Update since discharge: stable.   Plan of care communicated with patient      Issues  1. Home meds  Reconcilation done  May use Vitamins C, D, E    2. Wt/swelling  Worsening leg swelling  Wraps not done daily (QOD)  Lymphedema nurse on Tuesday    3. HFrEF  Referral to CORE Clinic    4. BiPAP  Referral to Sleep Clinic  Needs verification of use and utility  BiPAP very helpful. Fits well. Feels refreshed in morning    5. Podiatry  Referral to podiatry  Using make shift  shoes  Previously used sandals only. Now needs regular shoes  May need specially constructed shoes    6. Housing  Housing management - move to larger bathroom but doesn't have grab bars  Patient doesn't want to move      7. Hospital Bed  Has slept in chair for years  Needs HOB to incline up  Needs Foot of bed to incline up  Needs up and down to safely get in and out of bed     Hospital Bed Order    Face-to-Face Meeting (date): 11/10/17  Discussed need for hospital bed with patient  Discussed why hospital bed is medically necessary: prevent pressure sores/ulcers, incline for CHF / breathing; raise legs to help with edema, up and down to safely transfer  Discussed why pillows/wedges will not work: limited ability to reposition pillows, wedges;   Assessed that caregivers are capable of using hospital bed:  Only patient, no other caregivers    Care Plan  Reposition patient every 1-2 hours  Use mattress pad to prevent pressure sores/ulcers  Education provided to  patient about pressure sore prevention.  Patient  educated on proper turning and positioning.  Regular assessment by home nurse and/or clinic physician for pressure sores  Needs electric bed to incline Head of Bed, foot of bed, and for getting I and out of bed  Care plan established by Jack CEJA           Review of Systems:   CONSTITUTIONAL: no fatigue, no unexpected change in weight  SKIN: no worrisome rashes, no worrisome moles, no worrisome lesions  EYES: no acute vision problems or changes  ENT: no ear problems, no mouth problems, no throat problems  RESP: good with BiPAP  CV: no chest pain, no palpitations, no new or worsening peripheral edema  GI: no nausea, no vomiting, no constipation, no diarrhea  : no frequency, no dysuria, no hematuria  MUSCULOSKELETAL:edema  NEURO: no weakness, no dizziness, no syncope, no headaches  PSYCHIATRIC: upbeat            Physical Exam:     Vitals:    11/10/17 1117   BP: 118/76   Pulse: 104   Resp: 16   Temp: 98.2  F  (36.8  C)   TempSrc: Oral   SpO2: 95%   Weight: (!) 432 lb 3.2 oz (196 kg)     Body mass index is 58.62 kg/(m^2).     Wt Readings from Last 5 Encounters:   11/10/17 (!) 432 lb 3.2 oz (196 kg)   10/27/17 (!) 401 lb (181.9 kg)   10/07/17 (!) 405 lb 10.3 oz (184 kg)   08/14/17 (!) 421 lb (191 kg)   09/28/16 (!) 413 lb (187.3 kg)         GENERAL:: alert and no distress, using walker, almost fell when standing up  EYES: Eyes grossly normal to inspection, extraocular movements - intact, and PERRL  RESP: lungs clear to auscultation - no rales, no rhonchi, no wheezes  CV:  normal S1 S2, no S3 or S4 and no murmur, no click or rub -  ABDOMEN: soft, no tenderness, no  hepatosplenomegaly, no masses, normal bowel sounds  MS: extremities- wrapped bilaterally, some pitting  PSYCH: Alert and oriented times 3; speech- coherent , normal rate and volume; able to articulate logical thoughts, able to abstract reason, no tangential thoughts, no hallucinations or delusions, affect- normal             Results:     Results for orders placed or performed in visit on 11/10/17   INR ([facility])   Result Value Ref Range    INR 2.7    Comprehensive Metabolic Panel ([facility])   Result Value Ref Range    Albumin 3.6 3.5 - 4.7 mg/dL    Alkaline Phosphatase 88.3 31.7 - 110.7 U/L    ALT 15.6 0.0 - 45.0 U/L    AST 14.0 0.0 - 55.0 U/L    Bilirubin Total 1.0 0.2 - 1.3 mg/dL    Urea Nitrogen 3.3 (L) 7.0 - 21.0 mg/dL    Calcium 8.6 8.5 - 10.1 mg/dL    Chloride 96.6 (L) 98.0 - 110.0 mmol/L    Carbon Dioxide 26.8 20.0 - 32.0 mmol/L    Creatinine 0.7 0.7 - 1.3 mg/dL    Glucose 120.8 (H) 70.0 - 99.0 mg'dL    Potassium 3.7 3.3 - 4.5 mmol/dL    Sodium 136.7 132.6 - 141.4 mmol/L    Protein Total 6.7 (L) 6.8 - 8.8 g/dL   CBC with Diff Plt ([facility])   Result Value Ref Range    WBC 7.4 4.0 - 11.0 K/uL    Lymphocytes # 1.3 0.8 - 5.3 K/uL    % Lymphocytes 18.1 (L) 20.0 - 48.0 %L    Mid # 0.5 0.0 - 2.2 K/uL    Mid % 7.1 0.0 - 20.0 %M    GRANULOCYTES # 5.5 1.6 - 8.3  K/uL    % Granulocytes 74.8 40.0 - 75.0 %G    RBC 4.85 4.40 - 5.90 M/uL    Hemoglobin 14.6 13.3 - 17.7 g/dL    Hematocrit 47.0 40.0 - 53.0 %    MCV 97.0 78.0 - 100.0 fL    MCH 30.1 26.5 - 35.0 pg    MCHC 31.1 (L) 32.0 - 36.0 g/dL    Platelets 211.0 150.0 - 450.0 K/uL   Urinalysis, Micro If (UA) ([facility])   Result Value Ref Range    Specific Gravity Urine <=1.005 1.005 - 1.030    pH Urine 6.0 4.5 - 8.0    Leukocyte Esterase UR Negative NEGATIVE    Nitrite Urine Negative NEGATIVE    Protein UR Negative NEGATIVE    Glucose Urine Negative NEGATIVE    Ketones Urine Negative NEGATIVE    Urobilinogen mg/dL 0.2 E.U./dL 0.2 E.U./dL    Bilirubin UR Negative NEGATIVE    Blood UR Negative NEGATIVE         Assessment and Plan     Transitional Care Management  Respiratory failure largely due to CHF  Responded to diuresis, BiPAP  TCU for PT/OT, lymphedema treatment    1. CHF clinic  2. Lymphedema clinic  3. Shoes - podiatry referral  4. Hospital bed  5. Sleep clinic referral  6. Housing paper  7. Med rec done      1. Chronic systolic congestive heart failure (H) - HFrEF  Stable    - CARDIOVASCULAR CORE CLINIC REFERRAL - INTERNAL  - SLEEP EVALUATION & MANAGEMENT REFERRAL - [facility]  [phone] (Age 2 and up); Future    2. Persistent atrial fibrillation (H)  warfain 12.5 QD  INR 2.7    - INR ([facility])  - CBC with Diff Plt ([facility])  - warfarin (COUMADIN) 5 MG tablet; Take 2.5 tablets (12.5 mg) by mouth daily  Dispense: 30 tablet; Refill: 11    3. Lymphedema  Worsening  Wraps getting stretched out  Has appt with lymphedema nurses next week    - LYMPHEDEMA NURSE    4. Foot issues  Shoes do not fit. Needs specially constructed shoes  Presently using make-shift shoes  Has DM, venous stasis, lymphedema, obesity that are complicating foot issues    - PODIATRY REFERRAL ([facility]  REFERRAL - INTERNAL)    5. BiPAP (biphasic positive airway pressure) dependence  Needs  verification that he is using BiPAP and using it correctly  Feels great on BiPAP    - SLEEP EVALUATION & MANAGEMENT REFERRAL - ADULT -[facility]  [phone] (Age 2 and up); Future    6. Type 2 diabetes mellitus without complication, without long-term current use of insulin (H)  Not checking FSBS    - Comprehensive Metabolic Panel ([facility])  - Albumin Random Urine Quantitative with Creat Ratio  - metFORMIN (GLUCOPHAGE) 500 MG tablet; Take 1 tablet (500 mg) by mouth 2 times daily (with meals)  Dispense: 60 tablet; Refill: 11  - multivitamin, therapeutic with minerals (MULTI-VITAMIN) TABS tablet; Take 1 tablet by mouth daily  Dispense: 30 each; Refill: 11    7. Hypothyroidism, unspecified type  - TSH with free T4 reflex  - levothyroxine (SYNTHROID/LEVOTHROID) 150 MCG tablet; Take 1 tablet (150 mcg) by mouth daily  Dispense: 30 tablet; Refill: 11    8. Obesity / limited mobility  Needs hospital bed    9. Housing  Prefers to stay in present apartment  Has bars in good places. Can manage with smaller bathroom    10. Refills  Essential hypertension with goal blood pressure less than 140/90  - lisinopril (PRINIVIL/ZESTRIL) 5 MG tablet; Take 0.5 tablets (2.5 mg) by mouth daily  Dispense: 30 tablet; Refill: 11  - metoprolol (TOPROL-XL) 100 MG 24 hr tablet; Take 1 tablet (100 mg) by mouth daily  Dispense: 30 tablet; Refill: 11  Hyperlipidemia LDL goal <100  - omega 3 1000 MG CAPS; Take 1 g by mouth daily  Dispense: 30 capsule; Refill: 11  Constipation, unspecified constipation type  - polyethylene glycol (MIRALAX/GLYCOLAX) Packet; Take 17 g by mouth daily as needed for constipation  Dispense: 15 packet; Refill: 11  - senna-docusate (SENOKOT-S;PERICOLACE) 8.6-50 MG per tablet; Take 1-2 tablets by mouth 2 times daily as needed for constipation  Dispense: 60 tablet; Refill: 11  Hypokalemia  - potassium chloride SA (K-DUR/KLOR-CON M) 20 MEQ CR tablet; Take 2 tablets (40 mEq) by  mouth daily  Dispense: 30 tablet; Refill: 11   Urinary retention  - Comprehensive Metabolic Panel ([facility])  - Urinalysis, Micro If (UA) ([facility])  - tamsulosin (FLOMAX) 0.4 MG capsule; Take 1 capsule (0.4 mg) by mouth daily  Dispense: 30 capsule; Refill: 11      10. RTC 1 month   Check wt  Labs - BMP  May need increase lasix      E&M code to be billed if TCM cannot be: 63197  Type of decision making: High complexity (06281)      Options for treatment and follow-up care were reviewed with the patient  Clarke Moreira   engaged in the decision making process and verbalized understanding of the options discussed and agreed with the final plan.      Matthias Sanchez MD

## 2017-11-10 NOTE — TELEPHONE ENCOUNTER
===================    Pharmacy Attestation Statement:    Patient s case reviewed. I agree with the written assessment and plan of care.    Tj Palumbo PharmD.    ===================

## 2017-11-10 NOTE — MR AVS SNAPSHOT
After Visit Summary   11/10/2017    Clarke Moreira    MRN: [id number]           Patient Information     Date Of Birth          1950        Visit Information        Provider Department      11/10/2017 11:00 AM Matthias Sanchez MD [facility]        Today's Diagnoses     Urinary retention    -  1    Type 2 diabetes mellitus without complication, without long-term current use of insulin (H)        Persistent atrial fibrillation (H)        Hypothyroidism, unspecified type        Essential hypertension with goal blood pressure less than 140/90        Essential hypertension        Hyperlipidemia LDL goal <100        Constipation, unspecified constipation type        Hypokalemia        Atrial fibrillation, unspecified type (H)        Venous stasis          Care Instructions    Oh so much to discuss    1. CHF clinic  2. Lymphedema clinic  3. Shoes - podiatry referral  4. Hospital bed  5. Sleep clinic referral  6. Housing paper - go slow  7. OK to use your vitamins  8. Continue with medications printed on this list    Next appointment with Laura - 1 month    PHarper            Follow-ups after your visit        Who to contact     Please call your clinic at [phone] to:    Ask questions about your health    Make or cancel appointments    Discuss your medicines    Learn about your test results    Speak to your doctor   If you have compliments or concerns about an experience at your clinic, or if you wish to file a complaint, please contact [facility] Patient Relations at [phone] or email us at [email or web address]         Additional Information About Your Visit        MyChart Information     Adaptly is an electronic gateway that provides easy, online access to your medical records. With [employer], you can request a clinic appointment, read your test results, renew a prescription or communicate with your care team.     To sign up for  [facility] visit the website at [email or web address]   You will be asked to enter the access code listed below, as well as some personal information. Please follow the directions to create your username and password.     Your access code is: [email or web address]  Expires: 2017  1:53 PM     Your access code will  in 90 days. If you need help or a new code, please contact your [facility] or call [phone] for assistance.        Care EveryWhere ID     This is your Care EveryWhere ID. This could be used by other organizations to access your [place] medical records  [id number]        Your Vitals Were     Pulse Temperature Respirations Pulse Oximetry BMI (Body Mass Index)       104 98.2  F (36.8  C) (Oral) 16 95% 58.62 kg/m2        Blood Pressure from Last 3 Encounters:   11/10/17 118/76   10/27/17 120/69   10/08/17 92/73    Weight from Last 3 Encounters:   11/10/17 (!) 432 lb 3.2 oz (196 kg)   10/27/17 (!) 401 lb (181.9 kg)   10/07/17 (!) 405 lb 10.3 oz (184 kg)              We Performed the Following     Albumin Random Urine Quantitative with Creat Ratio     CBC with Diff Plt ([facility])     Comprehensive Metabolic Panel ([facility])     INR ([facility])     TSH with free T4 reflex     Urinalysis, Micro If (UA) ([facility])          Where to get your medicines      These medications were sent to [id number] - [place], [place] - [address]  [address] [facility] [address]     Phone:  [phone]     levothyroxine 150 MCG tablet    lisinopril 5 MG tablet    metFORMIN 500 MG tablet    metoprolol 100 MG 24 hr tablet    multivitamin, therapeutic with minerals Tabs tablet    omega 3 1000 MG Caps    polyethylene glycol Packet    potassium chloride SA 20 MEQ CR tablet    senna-docusate 8.6-50 MG per tablet    tamsulosin 0.4 MG capsule    warfarin 5 MG tablet    zinc sulfate 220 (50 ZN) MG capsule          Primary Care Provider Office Phone # Fax #    Matthias Sanchez MD  [phone]       [address] [address]        Equal Access to Services     TYLER R[place] : Hadii aad ku hadkem Cotton, shelton mahamedmai, myron banda, vasyl duarte jennifernegra mario lajose enriqueender charlotte So [facility] [phone].    ATENCIÓN: Si habla español, tiene a kim disposición servicios gratuitos de asistencia lingüística. Alexis al [phone].    We comply with applicable federal civil rights laws and [place] laws. We do not discriminate on the basis of race, color, national origin, age, disability, sex, sexual orientation, or gender identity.            Thank you!     Thank you for choosing [facility]  for your care. Our goal is always to provide you with excellent care. Hearing back from our patients is one way we can continue to improve our services. Please take a few minutes to complete the written survey that you may receive in the mail after your visit with us. Thank you!             Your Updated Medication List - Protect others around you: Learn how to safely use, store and throw away your medicines at www.disposemymeds.org.          This list is accurate as of: 11/10/17 12:18 PM.  Always use your most recent med list.                   Brand Name Dispense Instructions for use Diagnosis    aspirin 81 MG tablet     30 tablet    Take 1 tablet (81 mg) by mouth daily    Essential hypertension       atorvastatin 40 MG tablet    LIPITOR    30 tablet    Take 1 tablet (40 mg) by mouth daily    Type 2 diabetes mellitus without complication, without long-term current use of insulin (H)       furosemide 20 MG tablet    LASIX    30 tablet    Take 1 tablet (20 mg) by mouth daily    Hypervolemia, unspecified hypervolemia type       ipratropium - albuterol 0.5 mg/2.5 mg/3 mL 0.5-2.5 (3) MG/3ML neb solution    DUONEB    360 mL    Take 1 vial (3 mLs) by nebulization every 4 hours as needed for wheezing    SOB (shortness of breath)       levothyroxine  150 MCG tablet    SYNTHROID/LEVOTHROID    30 tablet    Take 1 tablet (150 mcg) by mouth daily    Hypothyroidism, unspecified type       lisinopril 5 MG tablet    PRINIVIL/ZESTRIL    30 tablet    Take 0.5 tablets (2.5 mg) by mouth daily    Essential hypertension with goal blood pressure less than 140/90       metFORMIN 500 MG tablet    GLUCOPHAGE    60 tablet    Take 1 tablet (500 mg) by mouth 2 times daily (with meals)    Type 2 diabetes mellitus without complication, without long-term current use of insulin (H)       metoprolol 100 MG 24 hr tablet    TOPROL-XL    30 tablet    Take 1 tablet (100 mg) by mouth daily    Essential hypertension       multivitamin, therapeutic with minerals Tabs tablet     30 each    Take 1 tablet by mouth daily    Type 2 diabetes mellitus without complication, without long-term current use of insulin (H)       omega 3 1000 MG Caps     30 capsule    Take 1 g by mouth daily    Hyperlipidemia LDL goal <100       order for DME     2 each    Equipment being ordered: Other: Velcro Compression stockings.  Treatment Diagnosis: bilateral lymphedema.    Lymphedema of extremity       polyethylene glycol Packet    MIRALAX/GLYCOLAX    15 packet    Take 17 g by mouth daily as needed for constipation    Constipation, unspecified constipation type       potassium chloride SA 20 MEQ CR tablet    K-DUR/KLOR-CON M    30 tablet    Take 2 tablets (40 mEq) by mouth daily    Hypokalemia       senna-docusate 8.6-50 MG per tablet    SENOKOT-S;PERICOLACE    60 tablet    Take 1-2 tablets by mouth 2 times daily as needed for constipation    Constipation, unspecified constipation type       tamsulosin 0.4 MG capsule    FLOMAX    30 capsule    Take 1 capsule (0.4 mg) by mouth daily    Urinary retention       warfarin 5 MG tablet    COUMADIN    30 tablet    Take 2.5 tablets (12.5 mg) by mouth daily    Atrial fibrillation, unspecified type (H)       zinc sulfate 220 (50 ZN) MG capsule    ZINCATE    30 capsule    Take 1  capsule (220 mg) by mouth daily    Venous stasis

## 2017-11-13 ENCOUNTER — DOCUMENTATION ONLY (OUTPATIENT)
Dept: CARE COORDINATION | Facility: CLINIC | Age: 67
End: 2017-11-13

## 2017-11-13 ENCOUNTER — TELEPHONE (OUTPATIENT)
Dept: FAMILY MEDICINE | Facility: CLINIC | Age: 67
End: 2017-11-13

## 2017-11-13 DIAGNOSIS — E87.6 HYPOKALEMIA: ICD-10-CM

## 2017-11-13 DIAGNOSIS — K59.00 CONSTIPATION, UNSPECIFIED CONSTIPATION TYPE: ICD-10-CM

## 2017-11-13 DIAGNOSIS — E87.70 HYPERVOLEMIA, UNSPECIFIED HYPERVOLEMIA TYPE: ICD-10-CM

## 2017-11-13 RX ORDER — FUROSEMIDE 20 MG
20 TABLET ORAL DAILY
Qty: 30 TABLET | Refills: 11 | Status: SHIPPED | OUTPATIENT
Start: 2017-11-13 | End: 2017-11-27

## 2017-11-13 RX ORDER — AMOXICILLIN 250 MG
1-2 CAPSULE ORAL 2 TIMES DAILY PRN
Qty: 60 TABLET | Refills: 11 | Status: SHIPPED | OUTPATIENT
Start: 2017-11-13 | End: 2018-12-07

## 2017-11-13 NOTE — TELEPHONE ENCOUNTER
[facility] phone call message - order or referral request for patient:     Order or referral being requested: Patient had an INR drawn on 11/13/2017 and nurse  Is calling to see when he needs to have it  Checked again. Please call.      Additional Comments:    OK to leave a message on voice mail? Yes    Primary language: English      needed? No    Call taken on November 13, 2017 at 3:39 PM by Yajaira Longoria

## 2017-11-13 NOTE — PROGRESS NOTES
Dear Dr. Matthias Sanchez  Medicare Home Health regulations requires [facility] to notify the Physician when the plan for visits has been altered.  We have provided fewer visits than ordered.  We are notifying you of a Missed Visit.  Clarke Moreira; MRN [id number]  Missed Visit  Is SN  Dates of missed services  11/10/2017  Reason: Patient cancelled due to MD baldwin    Sincerely [facility]  Marilee Reese  [phone]

## 2017-11-13 NOTE — TELEPHONE ENCOUNTER
Message routed to PharmD team. Please review and follow up with caller to advise when INR recheck needs to be completed.    Carla Gonzales RN

## 2017-11-14 ENCOUNTER — DOCUMENTATION ONLY (OUTPATIENT)
Dept: CARE COORDINATION | Facility: CLINIC | Age: 67
End: 2017-11-14

## 2017-11-14 NOTE — PROGRESS NOTES
[facility] now requests orders and shares plan of care/discharge summaries for some patients through [facility].  Please REPLY TO THIS MESSAGE in order to give authorization for orders when needed.  This is considered a verbal order, you will still receive a faxed copy of orders for signature.  Thank you for your assistance in improving collaboration for our patients.    ORDER/ OT Lymphedema therapy 1w1, 3w3, 2w2 to meet the following goals    PLAN OF CARE  GOALS TO BE MET BY  12/9/17  1. Pt will be able to tolerate gradient compression bandaging 23 hrs/day or wearing of compression  garment during waking hours to prevent reaccumulation of extracellular fluid and promote wound healing/increased skin integrity.  2. Pt will perform fluid mobilization HEP w/ MIN assistance to improve lymphatic flow and venous return.  3. Pt/CG will be independent in edema management to maintain reduced limb girth on days home care staff are unavailable.   GOALS TO BE MET BY DISCHARGE  1. Pt will verbalize understanding of techniques to independently manage their edema at home.  2. Pt/CG will be independent in donning/doffing, wearing schedule, and care of compression garment to maintain edema long term.  3. Circumferential measurement will be reduced by 5 percent in BLE to promote wound healing/improved skin integrity and enable pt to perform safe transfers and improved functional mobility    Please respond to this message as soon as possible. Thank you    Romana Nicholson OTR/L CLT  Work phone

## 2017-11-14 NOTE — TELEPHONE ENCOUNTER
PHARMACY TELEPHONE ENCOUNTER:    Reason: INR orders      Called and spoke with Stanley GERONIMO.  Orders for new INR were given for 11/27/17.  Home care nurse will clarify the warfarin dose the patient has been receiving.  There is a discrepancy between the orders noted on 11/10 and the 11/2 telephone note.      No changes made by phone today.  Will await medication rec from home.    Tj Palumbo, Pharm.D.

## 2017-11-20 ENCOUNTER — TELEPHONE (OUTPATIENT)
Dept: FAMILY MEDICINE | Facility: CLINIC | Age: 67
End: 2017-11-20

## 2017-11-20 DIAGNOSIS — I48.19 PERSISTENT ATRIAL FIBRILLATION (H): ICD-10-CM

## 2017-11-20 DIAGNOSIS — E87.6 HYPOKALEMIA: ICD-10-CM

## 2017-11-20 RX ORDER — WARFARIN SODIUM 5 MG/1
12.5 TABLET ORAL DAILY
Qty: 75 TABLET | Refills: 11 | Status: SHIPPED | OUTPATIENT
Start: 2017-11-20 | End: 2017-12-18

## 2017-11-20 RX ORDER — POTASSIUM CHLORIDE 1500 MG/1
40 TABLET, EXTENDED RELEASE ORAL DAILY
Qty: 60 TABLET | Refills: 11 | Status: SHIPPED | OUTPATIENT
Start: 2017-11-20 | End: 2018-02-26

## 2017-11-20 NOTE — TELEPHONE ENCOUNTER
Spoke to Dr. Sanchez who does not do home visits. Called patient and he informed me that he is having fluid in his abdomen and had been SOB. I advised that he needs to be seen again in clinic to be evaluated. Patient agreed and would like to be seen as well but needs to call his insurance for a ride. I informed patient that I would call his ride for him since he was told he needs to have a 2-3 day notice to do this. Mondays are a busy day to call to get rides for [employer] and told him that I would try to get a ride for him.     Spoke to patient again and he states that he is stable and can wait  Until he can get a ride. Will have carolann call for a ride for corinne as Jose off on Tuesday.

## 2017-11-20 NOTE — TELEPHONE ENCOUNTER
[facility] phone call message- patient requesting a refill:    Full Medication Name: Warfarin, Flomax, Lasix and potassium Chloride    Pharmacy confirmed as   [id number] - [place], [place] - [address]  [address]  [facility] [address]  Phone: [phone] Fax: [phone]  : Yes    Additional Comments: Pt states he is aware that he has refills but he will be out of these medications before this coming Friday and Pharmacy told he to contact his PCP.         OK to leave a message on voice mail? Yes    Primary language: English      needed? No    Call taken on November 20, 2017 at 8:36 AM by Sandhya Cha

## 2017-11-20 NOTE — TELEPHONE ENCOUNTER
"[facility] phone call message- patient requesting to speak with PCP or nurse:    PCP: Matthias Sanchez    Additional Comments: Minh went out to see patient today. \"Pt is retaining a lot of weight in his abdomen, not voiding much\". \"Saturation level at 88%, took awhile to get it back up\". \"Heart rate 102, walking 124\". \"Pt is sleeping on a stool with his head resting on his walker\".\" Due to pt weight re can not sit in or, get out of his only chair, as it is too low\". \"Pt has no way to get to clinic, I do not feel safe with pt being at home\". \"Is home visit possible\"? Forwarded message to triage RN.    Is a call back needed? Yes    Patient informed that it may take up to 2 business days to hear back from PCP:No    OK to leave a message on voice mail? Yes    Primary language: English      needed? No    Call taken on November 20, 2017 at 10:56 AM by Shaila Pettit    "

## 2017-11-20 NOTE — TELEPHONE ENCOUNTER
Per PCP:  As you noted, I refilled all meds for a year   Please call pharmacy and see if they did not receive the rx   If not, please resend all med refills   Matthew     Called pharmacy who states medications were mailed to patient on 11/10/17. Called patient who stated he was not given 30 day supplies of medications listed. RN reviewed scripts, warfarin and potassium chloride are written for 15 days or less. (warfarin 2.5 tablets daily, dispense 30, K-Clor 2 tablets daily, dispense 30)  RN advised message would be sent to PCP to update these scripts. Informed patient that flomax and lasix scripts appear to be 30 day supplies and this may be a pharmacy error. Patient states he will follow up with pharmacy.    RN reordered warfarin and potassium chloride for 30 day supply per verbal okay from preceptor, Dr. ERICA Moore.    Steffany Bo RN

## 2017-11-20 NOTE — TELEPHONE ENCOUNTER
Message routed to PCP and [facility] Care Coordinator to advise I home visit is appropriate.    Steffany Bo RN

## 2017-11-20 NOTE — TELEPHONE ENCOUNTER
All medications listed were refilled at 11/10 office visit. Nothing indicating patient may not refill medications in visit notes.    Message routed to PCP to review and confirm nothing further needed from patient at this time and he may refill without concerns.    Steffany Bo RN

## 2017-11-21 ENCOUNTER — DOCUMENTATION ONLY (OUTPATIENT)
Dept: CARE COORDINATION | Facility: CLINIC | Age: 67
End: 2017-11-21

## 2017-11-21 NOTE — PROGRESS NOTES
Dear Dr. Matthias Sanchez  Medicare Home Health regulations requires [facility] to notify the Physician when the plan for visits has been altered.  We have provided fewer visits than ordered.  We are notifying you of a Missed Visit.  Clarke Moreira; MRN [id number]  Missed Visit  Is SN  Dates of missed services  11/23/17  Reason: Patient cancelled due to holiday  Sincerely [facility]  Marilee Reese  [phone]

## 2017-11-24 ENCOUNTER — TELEPHONE (OUTPATIENT)
Dept: FAMILY MEDICINE | Facility: CLINIC | Age: 67
End: 2017-11-24

## 2017-11-24 DIAGNOSIS — L03.90 CELLULITIS: Primary | ICD-10-CM

## 2017-11-24 RX ORDER — CEPHALEXIN 500 MG/1
500 CAPSULE ORAL 3 TIMES DAILY
Qty: 30 CAPSULE | Refills: 0 | Status: SHIPPED | OUTPATIENT
Start: 2017-11-24 | End: 2017-12-22

## 2017-11-24 NOTE — TELEPHONE ENCOUNTER
[facility] phone call message-patient reporting a symptom:     Symptom: possible cellulitis of the right lower  Extremity per home care nurse.    Same Day Visit Offered: home care nurse evaluated the patient already today    Additional comments: needs antibiotic sent to the pharmacy    OK to leave message on voice mail? Yes    Primary language: English      needed? No    Call taken on November 24, 2017 at 2:36 PM by Jhoana Morgan

## 2017-11-24 NOTE — TELEPHONE ENCOUNTER
RN called home care to gather more information.    Per Stanley (home care) patient has a recent right heel wound that has healed well. States patient's legs look good and has pink flesh colored. Previously had not active cellulitis. Lymphedema treatment and wrapped legs three days per week. Worsening swelling and fluid in abdomen, thighs, and legs because patient is unable to lay down or sit.     Today, home care notes redness on patient's mid/lower right shin. Area is about 12 cm in length and 8 cm in width. Warm to touch but denies fever or pain.     Per appointment review, patient has appointment to be seen on 11/27/17 with PCP scheduled. Home care is wondering if antibiotic can be seen prior to appointment or if patient needs to be seen.    RN confirmed [facility] as preferred pharmacy.    Carla Gonzales RN

## 2017-11-24 NOTE — TELEPHONE ENCOUNTER
Will start antibiotic today  Still needs appt next week as scheduled    Keflex 500 TID x 10day  Please call in Rx    PHarper

## 2017-11-24 NOTE — TELEPHONE ENCOUNTER
RN entered order as written below. Called home care to inform. Home care verbalized understanding. Patient will start antibiotic today. Will follow up in clinic as scheduled on Monday.     Carla Gonzales RN

## 2017-11-27 ENCOUNTER — OFFICE VISIT (OUTPATIENT)
Dept: FAMILY MEDICINE | Facility: CLINIC | Age: 67
End: 2017-11-27

## 2017-11-27 VITALS
SYSTOLIC BLOOD PRESSURE: 126 MMHG | DIASTOLIC BLOOD PRESSURE: 78 MMHG | HEART RATE: 109 BPM | TEMPERATURE: 98.6 F | WEIGHT: 315 LBS | OXYGEN SATURATION: 94 % | BODY MASS INDEX: 59.27 KG/M2 | RESPIRATION RATE: 18 BRPM

## 2017-11-27 DIAGNOSIS — Z00.00 HEALTHCARE MAINTENANCE: ICD-10-CM

## 2017-11-27 DIAGNOSIS — E11.9 TYPE 2 DIABETES MELLITUS WITHOUT COMPLICATION, WITHOUT LONG-TERM CURRENT USE OF INSULIN (H): ICD-10-CM

## 2017-11-27 DIAGNOSIS — I48.19 PERSISTENT ATRIAL FIBRILLATION (H): ICD-10-CM

## 2017-11-27 DIAGNOSIS — Z13.9 SCREENING FOR CONDITION: ICD-10-CM

## 2017-11-27 DIAGNOSIS — I50.22 CHRONIC SYSTOLIC CONGESTIVE HEART FAILURE (H): Primary | ICD-10-CM

## 2017-11-27 DIAGNOSIS — L03.115 CELLULITIS OF RIGHT LOWER EXTREMITY: ICD-10-CM

## 2017-11-27 LAB
BUN SERPL-MCNC: 6.3 MG/DL (ref 7–21)
CALCIUM SERPL-MCNC: 8.9 MG/DL (ref 8.5–10.1)
CHLORIDE SERPLBLD-SCNC: 99.1 MMOL/L (ref 98–110)
CO2 SERPL-SCNC: 29.7 MMOL/L (ref 20–32)
CREAT SERPL-MCNC: 0.7 MG/DL (ref 0.7–1.3)
GFR SERPL CREATININE-BSD FRML MDRD: >90 ML/MIN/1.7 M2
GLUCOSE SERPL-MCNC: 128.7 MG'DL (ref 70–99)
HBA1C MFR BLD: 6.3 % (ref 4.1–5.7)
HCV AB SERPL QL IA: NONREACTIVE
INR PPP: 2.89 (ref 0.86–1.14)
NT-PROBNP SERPL-MCNC: 2008 PG/ML (ref 0–125)
POTASSIUM SERPL-SCNC: 4.1 MMOL/DL (ref 3.3–4.5)
SODIUM SERPL-SCNC: 137.1 MMOL/L (ref 132.6–141.4)

## 2017-11-27 RX ORDER — FUROSEMIDE 20 MG
40 TABLET ORAL 2 TIMES DAILY
Qty: 120 TABLET | Refills: 3 | Status: ON HOLD | OUTPATIENT
Start: 2017-11-27 | End: 2018-01-08

## 2017-11-27 ASSESSMENT — ANXIETY QUESTIONNAIRES
GAD7 TOTAL SCORE: 21
IF YOU CHECKED OFF ANY PROBLEMS ON THIS QUESTIONNAIRE, HOW DIFFICULT HAVE THESE PROBLEMS MADE IT FOR YOU TO DO YOUR WORK, TAKE CARE OF THINGS AT HOME, OR GET ALONG WITH OTHER PEOPLE: EXTREMELY DIFFICULT
6. BECOMING EASILY ANNOYED OR IRRITABLE: NEARLY EVERY DAY
7. FEELING AFRAID AS IF SOMETHING AWFUL MIGHT HAPPEN: NEARLY EVERY DAY
1. FEELING NERVOUS, ANXIOUS, OR ON EDGE: NEARLY EVERY DAY
5. BEING SO RESTLESS THAT IT IS HARD TO SIT STILL: NEARLY EVERY DAY
2. NOT BEING ABLE TO STOP OR CONTROL WORRYING: NEARLY EVERY DAY
3. WORRYING TOO MUCH ABOUT DIFFERENT THINGS: NEARLY EVERY DAY

## 2017-11-27 ASSESSMENT — PATIENT HEALTH QUESTIONNAIRE - PHQ9: 5. POOR APPETITE OR OVEREATING: NEARLY EVERY DAY

## 2017-11-27 NOTE — PATIENT INSTRUCTIONS
If you are not making progress in one week, we will hospitalize you again. Progress = cellulitis getting better, fluid coming off.      1. Chronic systolic congestive heart failure (H)  Increase lasix 40mg twice a day  Continue potassium    - N terminal pro BNP outpatient  - furosemide (LASIX) 20 MG tablet; Take 2 tablets (40 mg) by mouth 2 times daily  Dispense: 120 tablet; Refill: 3    2. Cellulitis  Continue keflex 500mg 3 times a day    3. Type 2 diabetes mellitus without complication, without long-term current use of insulin (H)  A1c=6.3    - Basic Metabolic Panel ([facility])  - Hemoglobin A1c ([facility])    4. Persistent atrial fibrillation (H)  - INR ([facility])    I will have Yaajira look at the referral and what's up  -CHF clinic  -Sleep clinic  -Podiatry clinic  -Hospital bed      [facility]

## 2017-11-27 NOTE — LETTER
November 29, 2017      Clarke Moreira  [address]  [facility] [address]        Dear Clarke,    Thank you for getting your care at [facility]. Please see below for your test results.  Your potassium and kidneys are doing well  Your INR was in a good range. The antibiotics can effect this  Your BNP (heart failure measure) was high as expected. Hopefully this will get better.  See you next week.    Resulted Orders   Basic Metabolic Panel ([facility])   Result Value Ref Range    Urea Nitrogen 6.3 (L) 7.0 - 21.0 mg/dL    Calcium 8.9 8.5 - 10.1 mg/dL    Chloride 99.1 98.0 - 110.0 mmol/L    Carbon Dioxide 29.7 20.0 - 32.0 mmol/L    Creatinine 0.7 0.7 - 1.3 mg/dL    Glucose 128.7 (H) 70.0 - 99.0 mg'dL    Potassium 4.1 3.3 - 4.5 mmol/dL    Sodium 137.1 132.6 - 141.4 mmol/L    GFR Estimate >90 >60.0 mL/min/1.7 m2    GFR Estimate If Black >90 >60.0 mL/min/1.7 m2   Hemoglobin A1c ([facility])   Result Value Ref Range    Hemoglobin A1C 6.3 (H) 4.1 - 5.7 %   Hepatitis C antibody   Result Value Ref Range    Hepatitis C Antibody Nonreactive NR^Nonreactive      Comment:      Assay performance characteristics have not been established for newborns,   infants, and children     N terminal pro BNP outpatient   Result Value Ref Range    N-Terminal Pro Bnp 2008 (H) 0 - 125 pg/mL      Comment:         Reference range shown and results flagged as abnormal are for the outpatient,   non acute settings. Establishing a baseline value for each individual patient   is useful for follow-up.  Suggested inpatient cut points for confirming diagnosis of CHF in an acute   setting are:   >450 pg/mL (age 18 to less than 50)   >900 pg/mL (age 50 to less than 75)   >1800 pg/mL (75 yrs and older)  An inpatient or emergency department NT-proPBNP <300 pg/mL effectively rules   out acute CHF, with 99% negative predictive value.      INR   Result Value Ref Range    INR 2.89 (H) 0.86 - 1.14           Sincerely,    Matthias Sanchez MD

## 2017-11-27 NOTE — PROGRESS NOTES
HPI:       Clarke Moreira is a 67 year old who presents for the following  Patient presents with:  RECHECK: Lymphedema. He was at 399.4 at his rehab last month and he is filling up w/ fluid. It is causing difficulties w/ breathing.  Infection: He states there is a possible infection of the lower right leg.      F/u Lymphedema / CHF    Lymphedema- he is filling up with fluid since his last rehab visit 10/27/17.     Check wt- increasing w/ fluid build up  Upper Legs feel like they are going to split with swelling  Unable to get legs up very high which contributes to fluid retention  Bariatic recliner broke today.  Hospital bed not in yet      Infection / cellulitis- on Friday his lymphedema nurse was unwrapping his dressings of the lower right leg and they were concerned about an infection. It was red and very warm per patient.      Preventive    Offer PCV- he would like to discuss more in detail w/ Provider.    Offer Tdap    Hep C      A-fib / INR  Check INR  Presently on 12.5 qd    Appts set?? - CareCoordinator  -CHF clinic  -Sleep clinic  -Podiatry clinic  -Hospital bed  -Lymphedema clinic              Problem, Medication and Allergy Lists were reviewed and are current.  Patient is an established patient of this clinic.         Review of Systems:   Review of Systems   Negative ROS except as noted above in HPI         Physical Exam:     Patient Vitals for the past 24 hrs:   BP Temp Temp src Pulse Resp SpO2 Weight   11/27/17 1149 126/78 98.6  F (37  C) Oral 109 18 94 % (!) 437 lb (198.2 kg)     Body mass index is 59.27 kg/(m^2).  Vitals were reviewed and were normal     Wt Readings from Last 5 Encounters:   11/27/17 (!) 437 lb (198.2 kg)   11/10/17 (!) 432 lb 3.2 oz (196 kg)   10/27/17 (!) 401 lb (181.9 kg)   10/07/17 (!) 405 lb 10.3 oz (184 kg)   08/14/17 (!) 421 lb (191 kg)     399 at TCU      Physical Exam  Gen: no distress  Lungs: clear  Cor: irreg irreg pulse no S3 S4 murmur or rub. Unable to assess  "for JVD/HJR  Abd: soft, nontender, no HSM, pannus with brawny feel from fluid  Ext: 98u57bd redness with \"blistering\" (firm bubbles) on R lower leg, +3 edeam  Bruise on R forearm      Results:     Results for orders placed or performed in visit on 11/27/17   Basic Metabolic Panel ([facility])   Result Value Ref Range    Urea Nitrogen 6.3 (L) 7.0 - 21.0 mg/dL    Calcium 8.9 8.5 - 10.1 mg/dL    Chloride 99.1 98.0 - 110.0 mmol/L    Carbon Dioxide 29.7 20.0 - 32.0 mmol/L    Creatinine 0.7 0.7 - 1.3 mg/dL    Glucose 128.7 (H) 70.0 - 99.0 mg'dL    Potassium 4.1 3.3 - 4.5 mmol/dL    Sodium 137.1 132.6 - 141.4 mmol/L    GFR Estimate >90 >60.0 mL/min/1.7 m2    GFR Estimate If Black >90 >60.0 mL/min/1.7 m2   Hemoglobin A1c ([facility])   Result Value Ref Range    Hemoglobin A1C 6.3 (H) 4.1 - 5.7 %     BNP - pending  INR - pending      Assessment and Plan     If not making progress in one week, we will hospitalize again. Progress = cellulitis getting better, fluid coming off.      1. Chronic systolic congestive heart failure (H)  Significant fluid retention  Partially due to not having right equipment at home (hospital bed, recliner with foot rest)  Dry wt = 399. Wt today = 437  Increase lasix 40mg twice a day  Continue potassium    - N terminal pro BNP outpatient  - furosemide (LASIX) 20 MG tablet; Take 2 tablets (40 mg) by mouth 2 times daily  Dispense: 120 tablet; Refill: 3    2. Cellulitis - R lower leg  Continue keflex 500mg 3 times a day    3. Type 2 diabetes mellitus without complication, without long-term current use of insulin (H)  A1c=6.3    - Basic Metabolic Panel ([facility])  - Hemoglobin A1c ([facility])    4. Persistent atrial fibrillation (H)  - INR ([facility])    5. Healthcare maintenance  - ADMIN VACCINE, INITIAL  - ADMIN VACCINE, EACH ADDITIONAL  - TDAP VACCINE (BOOSTRIX)  - Pneumococcal vaccine 13 valent PCV13 IM (Prevnar) [81083]    6. Screening for condition  - Hepatitis C antibody      I will have Care " Coordinator look at the referral and why patient has not heard  -CHF clinic  -Sleep clinic  -Podiatry clinic  -Hospital bed    Options for treatment and follow-up care were reviewed with the patient. Clarke Moreira  engaged in the decision making process and verbalized understanding of the options discussed and agreed with the final plan.    Matthias Sanchez MD    40min spent with the patient, >50% in arranging care for chf, cellulitis, referrals, equipment  PHarper    Consider stopping metformin at next visit with CHF

## 2017-11-27 NOTE — LETTER
November 29, 2017      Clarke Moreira  [address]  [facility] [address]        Dear Clarke,    Thank you for getting your care at [facility]. Please see below for your test results.  Your potassium and kidneys are doing well  Your BNP (heart failure measure) is high as expected.  Your INR was in range. The antibiotics can effect that  Your g    Resulted Orders   Basic Metabolic Panel ([facility])   Result Value Ref Range    Urea Nitrogen 6.3 (L) 7.0 - 21.0 mg/dL    Calcium 8.9 8.5 - 10.1 mg/dL    Chloride 99.1 98.0 - 110.0 mmol/L    Carbon Dioxide 29.7 20.0 - 32.0 mmol/L    Creatinine 0.7 0.7 - 1.3 mg/dL    Glucose 128.7 (H) 70.0 - 99.0 mg'dL    Potassium 4.1 3.3 - 4.5 mmol/dL    Sodium 137.1 132.6 - 141.4 mmol/L    GFR Estimate >90 >60.0 mL/min/1.7 m2    GFR Estimate If Black >90 >60.0 mL/min/1.7 m2   Hemoglobin A1c ([facility])   Result Value Ref Range    Hemoglobin A1C 6.3 (H) 4.1 - 5.7 %   Hepatitis C antibody   Result Value Ref Range    Hepatitis C Antibody Nonreactive NR^Nonreactive      Comment:      Assay performance characteristics have not been established for newborns,   infants, and children     N terminal pro BNP outpatient   Result Value Ref Range    N-Terminal Pro Bnp 2008 (H) 0 - 125 pg/mL      Comment:         Reference range shown and results flagged as abnormal are for the outpatient,   non acute settings. Establishing a baseline value for each individual patient   is useful for follow-up.  Suggested inpatient cut points for confirming diagnosis of CHF in an acute   setting are:   >450 pg/mL (age 18 to less than 50)   >900 pg/mL (age 50 to less than 75)   >1800 pg/mL (75 yrs and older)  An inpatient or emergency department NT-proPBNP <300 pg/mL effectively rules   out acute CHF, with 99% negative predictive value.      INR   Result Value Ref Range    INR 2.89 (H) 0.86 - 1.14           Sincerely,    Matthias Sanchez MD

## 2017-11-27 NOTE — MR AVS SNAPSHOT
After Visit Summary   11/27/2017    Clarke Moreira    MRN: [id number]           Patient Information     Date Of Birth          1950        Visit Information        Provider Department      11/27/2017 11:20 AM Matthias Sanchez MD [facility]        Today's Diagnoses     Chronic systolic congestive heart failure (H)    -  1    Type 2 diabetes mellitus without complication, without long-term current use of insulin (H)        Persistent atrial fibrillation (H)        Screening for condition          Care Instructions    If you are not making progress in one week, we will hospitalize you again. Progress = cellulitis getting better, fluid coming off.      1. Chronic systolic congestive heart failure (H)  Increase lasix 40mg twice a day  Continue potassium    - N terminal pro BNP outpatient  - furosemide (LASIX) 20 MG tablet; Take 2 tablets (40 mg) by mouth 2 times daily  Dispense: 120 tablet; Refill: 3    2. Cellulitis  Continue keflex 500mg 3 times a day    3. Type 2 diabetes mellitus without complication, without long-term current use of insulin (H)  A1c=6.3    - Basic Metabolic Panel ([facility])  - Hemoglobin A1c ([facility])    4. Persistent atrial fibrillation (H)  - INR ([facility])    I will have Yajaira look at the referral and what's up  -CHF clinic  -Sleep clinic  -Podiatry clinic  -Hospital bed      PHarper              Follow-ups after your visit        Who to contact     Please call your clinic at [phone] to:    Ask questions about your health    Make or cancel appointments    Discuss your medicines    Learn about your test results    Speak to your doctor   If you have compliments or concerns about an experience at your clinic, or if you wish to file a complaint, please contact [facility] Patient Relations at [phone] or email us at [email or web address]         Additional Information About Your Visit        MyChart Information      Album[employer] is an electronic gateway that provides easy, online access to your medical records. With [employer], you can request a clinic appointment, read your test results, renew a prescription or communicate with your care team.     To sign up for [employer] visit the website at [email or web address]   You will be asked to enter the access code listed below, as well as some personal information. Please follow the directions to create your username and password.     Your access code is: [email or web address]  Expires: 2018 12:33 PM     Your access code will  in 90 days. If you need help or a new code, please contact your [facility] or call [phone] for assistance.        Care EveryWhere ID     This is your Care EveryWhere ID. This could be used by other organizations to access your [place] medical records  [id number]        Your Vitals Were     Pulse Temperature Respirations Pulse Oximetry BMI (Body Mass Index)       109 98.6  F (37  C) (Oral) 18 94% 59.27 kg/m2        Blood Pressure from Last 3 Encounters:   17 126/78   11/10/17 118/76   10/27/17 120/69    Weight from Last 3 Encounters:   17 (!) 437 lb (198.2 kg)   11/10/17 (!) 432 lb 3.2 oz (196 kg)   10/27/17 (!) 401 lb (181.9 kg)              We Performed the Following     Basic Metabolic Panel ([facility])     Hemoglobin A1c ([facility])     Hepatitis C antibody     INR ([facility])     N terminal pro BNP outpatient          Today's Medication Changes          These changes are accurate as of: 17 12:33 PM.  If you have any questions, ask your nurse or doctor.               These medicines have changed or have updated prescriptions.        Dose/Directions    furosemide 20 MG tablet   Commonly known as:  LASIX   This may have changed:    - how much to take  - when to take this   Used for:  Chronic systolic congestive heart failure (H)   Changed by:  Matthias Sanchez MD        Dose:  40 mg   Take 2 tablets (40  mg) by mouth 2 times daily   Quantity:  120 tablet   Refills:  3            Where to get your medicines      These medications were sent to [id number] - [place], [place] - [address]  [address] [facility] [address]     Phone:  [phone]     furosemide 20 MG tablet                Primary Care Provider Office Phone # Fax #    Matthias Sanchez [phone]       [address]  [facility] [address]        Equal Access to Services     TYLER [place] : Hadii aad ku hadasho Soomaali, waaxda luqadaha, qaybta kaalmada adeegyada, waxay idiin haydomingon camryn casarez . So [facility] [phone].    ATENCIÓN: Si habla español, tiene a kim disposición servicios gratuitos de asistencia lingüística. Ll[facility] [phone].    We comply with applicable federal civil rights laws and [place] laws. We do not discriminate on the basis of race, color, national origin, age, disability, sex, sexual orientation, or gender identity.            Thank you!     Thank you for choosing [facility]  for your care. Our goal is always to provide you with excellent care. Hearing back from our patients is one way we can continue to improve our services. Please take a few minutes to complete the written survey that you may receive in the mail after your visit with us. Thank you!             Your Updated Medication List - Protect others around you: Learn how to safely use, store and throw away your medicines at www.disposemymeds.org.          This list is accurate as of: 11/27/17 12:33 PM.  Always use your most recent med list.                   Brand Name Dispense Instructions for use Diagnosis    aspirin 81 MG tablet     30 tablet    Take 1 tablet (81 mg) by mouth daily    Essential hypertension       atorvastatin 40 MG tablet    LIPITOR    30 tablet    Take 1 tablet (40 mg) by mouth daily    Type 2 diabetes mellitus without complication, without long-term current use of insulin  (H)       cephALEXin 500 MG capsule    KEFLEX    30 capsule    Take 1 capsule (500 mg) by mouth 3 times daily    Cellulitis       furosemide 20 MG tablet    LASIX    120 tablet    Take 2 tablets (40 mg) by mouth 2 times daily    Chronic systolic congestive heart failure (H)       ipratropium - albuterol 0.5 mg/2.5 mg/3 mL 0.5-2.5 (3) MG/3ML neb solution    DUONEB    360 mL    Take 1 vial (3 mLs) by nebulization every 4 hours as needed for wheezing    SOB (shortness of breath)       levothyroxine 150 MCG tablet    SYNTHROID/LEVOTHROID    30 tablet    Take 1 tablet (150 mcg) by mouth daily    Hypothyroidism, unspecified type       lisinopril 5 MG tablet    PRINIVIL/ZESTRIL    30 tablet    Take 0.5 tablets (2.5 mg) by mouth daily    Essential hypertension with goal blood pressure less than 140/90       metFORMIN 500 MG tablet    GLUCOPHAGE    60 tablet    Take 1 tablet (500 mg) by mouth 2 times daily (with meals)    Type 2 diabetes mellitus without complication, without long-term current use of insulin (H)       metoprolol 100 MG 24 hr tablet    TOPROL-XL    30 tablet    Take 1 tablet (100 mg) by mouth daily    Essential hypertension with goal blood pressure less than 140/90       multivitamin, therapeutic with minerals Tabs tablet     30 each    Take 1 tablet by mouth daily    Type 2 diabetes mellitus without complication, without long-term current use of insulin (H)       omega 3 1000 MG Caps     30 capsule    Take 1 g by mouth daily    Hyperlipidemia LDL goal <100       order for DME     2 each    Equipment being ordered: Other: Velcro Compression stockings.  Treatment Diagnosis: bilateral lymphedema.    Lymphedema of extremity       polyethylene glycol Packet    MIRALAX/GLYCOLAX    15 packet    Take 17 g by mouth daily as needed for constipation    Constipation, unspecified constipation type       potassium chloride SA 20 MEQ CR tablet    K-DUR/KLOR-CON M    60 tablet    Take 2 tablets (40 mEq) by mouth daily     Hypokalemia       senna-docusate 8.6-50 MG per tablet    SENOKOT-S;PERICOLACE    60 tablet    Take 1-2 tablets by mouth 2 times daily as needed for constipation    Constipation, unspecified constipation type       tamsulosin 0.4 MG capsule    FLOMAX    30 capsule    Take 1 capsule (0.4 mg) by mouth daily    Urinary retention       warfarin 5 MG tablet    COUMADIN    75 tablet    Take 2.5 tablets (12.5 mg) by mouth daily    Persistent atrial fibrillation (H)       zinc sulfate 220 (50 ZN) MG capsule    ZINCATE    30 capsule    Take 1 capsule (220 mg) by mouth daily    Venous stasis

## 2017-11-28 ENCOUNTER — TELEPHONE (OUTPATIENT)
Dept: FAMILY MEDICINE | Facility: CLINIC | Age: 67
End: 2017-11-28

## 2017-11-28 DIAGNOSIS — B37.2 CANDIDAL INTERTRIGO: Primary | ICD-10-CM

## 2017-11-28 RX ORDER — NYSTATIN 100000 U/G
CREAM TOPICAL 3 TIMES DAILY
Qty: 30 G | Refills: 1 | Status: SHIPPED | OUTPATIENT
Start: 2017-11-28 | End: 2017-12-12

## 2017-11-28 NOTE — TELEPHONE ENCOUNTER
INR Result: 2.89    Name of person that should receive call back: Stanley Blount / [facility]    Relationship to patient: Home Care RN    Okay to leave a voicemail: Yes    Is an  needed: No

## 2017-11-28 NOTE — TELEPHONE ENCOUNTER
[facility] phone call message - order or referral request for patient:     Order or referral being requested: Order to apply Nystatin cream and med sent to pharmacy      Additional Comments: Home Care nurse is calling to let the PCP know that the patient is developing a yeast infection in his abdominal folds. She is requesting an order to apply Nystatin Cream and would need a prescription sent to [facility] at [address]  [facility] [address]    OK to leave a message on voice mail? Yes    Primary language: English      needed? No    Call taken on November 28, 2017 at 4:37 PM by Arielle Day

## 2017-11-28 NOTE — TELEPHONE ENCOUNTER
"  Clinical Pharmacy Note  - Telephone encounter     Home care nurse calls today with a new INR result for Clarke CHINCHILLA Moreira  PCP:  Matthias Sanchez    Home Care nurse name: Stanley Blount    Current warfarin dose: 12.5 mg daily     New concerns: none    Lab Results   Component Value Date    INR 2.89 11/27/2017    INR 2.7 11/10/2017    INR 1.4 10/30/2017    INR 2.33 10/26/2017    INR 2.39 10/24/2017    INR 2.48 10/23/2017    INR 2.85 10/22/2017         Assessment and Plan:  Home care nurse calls today to request new orders for recheck.      Warfarin dose: continue 12.5 mg daily    Follow up date: 2 weeks    Please see \"[facility] Anticoagulation Flowsheet\"       All medications were reviewed and found to be indicated, effective, safe and convenient unless drug therapy problems identified as described above.    Matthias Waller was provided our recommendations via routed note and Dr. Aguilar was available for supervision during this visit and is the authorizing prescriber for this visit through the pharmacist collaborative practice agreement.    Tj Palumbo, Pharm.D     "

## 2017-11-28 NOTE — PLAN OF CARE
Problem: Patient Care Overview  Goal: Plan of Care/Patient Progress Review  Outcome: Improving  Pt. A/Ox4, VSS on RA except for low BP 92/52 (cardizem held). BG . Some abd discomfort reported, pt reporting some straining w/ voiding. Low urine output noted this evening, bladder scanned for 10 cc. However, scanner may not be accurate (dt abd edema). Pt voided in very small amounts in urinal. Tolerated PO intake well, w/ adherence to 1.5 L fluid restriction. Up w/ assist 2 w/ gait belt and walker. Team aware of pt's difficulty w/ voiding, order in for straight cath. Straight cathed for 975mL, pt tolerated procedure well, and reported having a relief in abd discomfort. PICC tripled lumen heparin locked, white lumen occluded. Red and grey lumen patent w/ blood return. Dressings to BLE CDI, per pt dressings have been changed daily d/t lymph wraps.  Dressings were changed prior to evening shift, and lymph wrapped afterwards. Nursing will continue to monitor and follow POC.            Return ob. Doing well   Considering cord blood donation. Information given.   F/u in 1 week   GBS at next visit.

## 2017-12-04 ENCOUNTER — OFFICE VISIT (OUTPATIENT)
Dept: FAMILY MEDICINE | Facility: CLINIC | Age: 67
End: 2017-12-04

## 2017-12-04 VITALS
RESPIRATION RATE: 12 BRPM | HEART RATE: 76 BPM | DIASTOLIC BLOOD PRESSURE: 84 MMHG | OXYGEN SATURATION: 94 % | SYSTOLIC BLOOD PRESSURE: 124 MMHG | TEMPERATURE: 98.6 F | WEIGHT: 315 LBS | BODY MASS INDEX: 55.47 KG/M2

## 2017-12-04 DIAGNOSIS — L03.115 CELLULITIS OF RIGHT LOWER EXTREMITY: ICD-10-CM

## 2017-12-04 DIAGNOSIS — B37.2 CANDIDAL INTERTRIGO: ICD-10-CM

## 2017-12-04 DIAGNOSIS — Z99.89 BIPAP (BIPHASIC POSITIVE AIRWAY PRESSURE) DEPENDENCE: ICD-10-CM

## 2017-12-04 DIAGNOSIS — I50.22 CHRONIC SYSTOLIC CONGESTIVE HEART FAILURE (H): Primary | ICD-10-CM

## 2017-12-04 DIAGNOSIS — I48.19 PERSISTENT ATRIAL FIBRILLATION (H): ICD-10-CM

## 2017-12-04 LAB
BUN SERPL-MCNC: 11.8 MG/DL (ref 7–21)
CALCIUM SERPL-MCNC: 9.1 MG/DL (ref 8.5–10.1)
CHLORIDE SERPLBLD-SCNC: 97.3 MMOL/L (ref 98–110)
CO2 SERPL-SCNC: 28.6 MMOL/L (ref 20–32)
CREAT SERPL-MCNC: 0.7 MG/DL (ref 0.7–1.3)
GFR SERPL CREATININE-BSD FRML MDRD: >90 ML/MIN/1.7 M2
GLUCOSE SERPL-MCNC: 121.2 MG'DL (ref 70–99)
INR PPP: 2.9 (ref 0.86–1.14)
INR PPP: >3.5
POTASSIUM SERPL-SCNC: 3.9 MMOL/DL (ref 3.3–4.5)
SODIUM SERPL-SCNC: 138.3 MMOL/L (ref 132.6–141.4)

## 2017-12-04 RX ORDER — CEPHALEXIN 500 MG/1
500 CAPSULE ORAL 3 TIMES DAILY
Qty: 30 CAPSULE | Refills: 0 | Status: SHIPPED | OUTPATIENT
Start: 2017-12-04 | End: 2017-12-22

## 2017-12-04 NOTE — PROGRESS NOTES
HPI  67 year old male here for evaluation of several issues.     1. Cellulitis  Finishing keflex  Still pink but not the angry red as before    2. CHF  Wt down a lot  Breathing better. Still not great  Legs still feel tense  Using lymphadema wraps on L    3. Pannus  Swollen tender. Using cream. Use it wider area    4. INR  Was good. Now on antibiotics    5. Equipment  Bed coming tomorrow  Has bariatric chair. Working.     6. Referrals  Still not word from Sleep, [facility] clinic, podiatry,       ROS  6 point ROS neg other than the symptoms noted above in the HPI.    MEDS  Current Outpatient Prescriptions   Medication     nystatin (MYCOSTATIN) cream     furosemide (LASIX) 20 MG tablet     cephALEXin (KEFLEX) 500 MG capsule     warfarin (COUMADIN) 5 MG tablet     potassium chloride SA (K-DUR/KLOR-CON M) 20 MEQ CR tablet     senna-docusate (SENOKOT-S;PERICOLACE) 8.6-50 MG per tablet     levothyroxine (SYNTHROID/LEVOTHROID) 150 MCG tablet     lisinopril (PRINIVIL/ZESTRIL) 5 MG tablet     metFORMIN (GLUCOPHAGE) 500 MG tablet     metoprolol (TOPROL-XL) 100 MG 24 hr tablet     multivitamin, therapeutic with minerals (MULTI-VITAMIN) TABS tablet     omega 3 1000 MG CAPS     polyethylene glycol (MIRALAX/GLYCOLAX) Packet     tamsulosin (FLOMAX) 0.4 MG capsule     order for DME     aspirin 81 MG tablet     atorvastatin (LIPITOR) 40 MG tablet     zinc sulfate (ZINCATE) 220 (50 ZN) MG capsule     ipratropium - albuterol 0.5 mg/2.5 mg/3 mL (DUONEB) 0.5-2.5 (3) MG/3ML neb solution     No current facility-administered medications for this visit.          SOC      FHx  Family History   Problem Relation Age of Onset     Depression Mother      CANCER No family hx of      No family history of skin cancer       PHYSICAL EXAMINATION:  Vital signs: /84  Pulse 76  Temp 98.6  F (37  C) (Oral)  Resp 12  Wt (!) 409 lb (185.5 kg)  SpO2 94%  BMI 55.47 kg/m2    Wt Readings from Last 5 Encounters:   12/04/17 (!) 409 lb (185.5 kg)   11/27/17  (!) 437 lb (198.2 kg)   11/10/17 (!) 432 lb 3.2 oz (196 kg)   10/27/17 (!) 401 lb (181.9 kg)   10/07/17 (!) 405 lb 10.3 oz (184 kg)         GENERAL:: alert and no distress  RESP: lungs clear to auscultation - no rales, no rhonchi, no wheezes  CV: irregular rhythm, rate 76, normal S1 S2, no S3 or S4 and no murmur, no click or rub -  ABDOMEN: soft, no tenderness, no  hepatosplenomegaly, no masses, normal bowel sounds pannus with swollen area, cobblestone, pink, mildly tender  MS: RLL - pink lower leg (better), L wrapped        LABS  Results for orders placed or performed in visit on 12/04/17   Basic Metabolic Panel ([facility])   Result Value Ref Range    Urea Nitrogen 11.8 7.0 - 21.0 mg/dL    Calcium 9.1 8.5 - 10.1 mg/dL    Chloride 97.3 (L) 98.0 - 110.0 mmol/L    Carbon Dioxide 28.6 20.0 - 32.0 mmol/L    Creatinine 0.7 0.7 - 1.3 mg/dL    Glucose 121.2 (H) 70.0 - 99.0 mg'dL    Potassium 3.9 3.3 - 4.5 mmol/dL    Sodium 138.3 132.6 - 141.4 mmol/L    GFR Estimate >90 >60.0 mL/min/1.7 m2    GFR Estimate If Black >90 >60.0 mL/min/1.7 m2   INR ([facility])   Result Value Ref Range    INR >3.5    INR   Result Value Ref Range    INR 2.90 (H) 0.86 - 1.14           ASSESSMENT/PLAN    1. Chronic systolic congestive heart failure (H)  Continue lasix 40mg twice a day  Good wt loss / diuresis. Still a lot to go  Legs still feel tense and full of fluid.    - Basic Metabolic Panel ([facility])  - N terminal pro BNP outpatient    2. Cellulitis of right lower extremity  Continue keflex for another 10days  OK to wrap Right leg now    - cephALEXin (KEFLEX) 500 MG capsule; Take 1 capsule (500 mg) by mouth 3 times daily  Dispense: 30 capsule; Refill: 0    3. Candidal intertrigo  contnue with cream all over the area      4. Persistent atrial fibrillation (H)  Checking INR on antibiotic  - INR ([facility])  - INR    5. BiPAP (biphasic positive airway pressure) dependence  Appt finally arranged.    - SLEEP EVALUATION & MANAGEMENT REFERRAL - ADULT  -[facility] [place] [facility]  [phone] (Age 2 and up)    6. RTC 2 weeks  New equipment (bed) comes tomorrow  Should help with recovery  If not improving, consider hospitalization    Check wt  Monitor BMP. K still OK  CHeck INR      ANAYELI CEJA

## 2017-12-04 NOTE — MR AVS SNAPSHOT
After Visit Summary   12/4/2017    Clarke Moreira    MRN: [id number]           Patient Information     Date Of Birth          1950        Visit Information        Provider Department      12/4/2017 11:20 AM Matthias Sanchez MD [facility]        Today's Diagnoses     Chronic systolic congestive heart failure (H)    -  1    Cellulitis of right lower extremity        Candidal intertrigo        Persistent atrial fibrillation (H)        BiPAP (biphasic positive airway pressure) dependence          Care Instructions    1. Chronic systolic congestive heart failure (H)  Continue lasix 40mg twice a day    - Basic Metabolic Panel ([facility])  - N terminal pro BNP outpatient    2. Cellulitis of right lower extremity  Continue keflex for another 10days  OK to wrap Right leg now    - cephALEXin (KEFLEX) 500 MG capsule; Take 1 capsule (500 mg) by mouth 3 times daily  Dispense: 30 capsule; Refill: 0    3. Candidal intertrigo  contnue with cream all over the area    4. Persistent atrial fibrillation (H)  Checking your INR    - INR ([facility])    Next visit - 2 weeks            Follow-ups after your visit        Your next 10 appointments already scheduled     Dec 11, 2017 12:00 PM CST   Office Visit with Suri Gill PA-C   [facility]    [address] [place] [id number] [phone]           Bring a current list of meds and any records pertaining to this visit. For Physicals, please bring immunization records and any forms needing to be filled out. Please arrive 10 minutes early to complete paperwork.            Dec 15, 2017 10:00 AM CST   (Arrive by 9:45 AM)   NEW HEART FAILURE with Armin Coronel MD   [facility] ([facility])    [address]  3rd Floor  [facility] [id number] [phone]            Dec 21, 2017 10:20 AM CST   Return Visit with  Matthias Sanchez MD   [facility] ([facility])     [address],  [address]  [facility] [address]   [phone]            Dec 22, 2017 11:00 AM CST   (Arrive by 10:45 AM)   NEW FOOT/ANKLE with Jesús Lagos DPM   [facility] ([facility])    [address]  4th Floor  [facility] [id number] [phone]              Who to contact     Please call your clinic at [phone] to:    Ask questions about your health    Make or cancel appointments    Discuss your medicines    Learn about your test results    Speak to your doctor   If you have compliments or concerns about an experience at your clinic, or if you wish to file a complaint, please contact [facility] Patient Relations at [phone] or email us at [email or web address]         Additional Information About Your Visit        [employer] Mobile Information     OFERTALDIA is an electronic gateway that provides easy, online access to your medical records. With OFERTALDIA, you can request a clinic appointment, read your test results, renew a prescription or communicate with your care team.     To sign up for OFERTALDIA visit the website at [email or web address]   You will be asked to enter the access code listed below, as well as some personal information. Please follow the directions to create your username and password.     Your access code is: [email or web address]  Expires: 2018 12:33 PM     Your access code will  in 90 days. If you need help or a new code, please contact your [facility] or call [phone] for assistance.        Care EveryWhere ID     This is your Care EveryWhere ID. This could be used by other organizations to access your [facility] medical records  [id number]        Your Vitals Were     Pulse Temperature Respirations Pulse Oximetry BMI (Body Mass Index)       115 98.6  F (37  C) (Oral)  115 94% 55.47 kg/m2        Blood Pressure from Last 3 Encounters:   12/04/17 124/84   11/27/17 126/78   11/10/17 118/76    Weight from Last 3 Encounters:   12/04/17 (!) 409 lb (185.5 kg)   11/27/17 (!) 437 lb (198.2 kg)   11/10/17 (!) 432 lb 3.2 oz (196 kg)              We Performed the Following     Basic Metabolic Panel ([facility])     INR ([facility])     INR     N terminal pro BNP outpatient     SLEEP EVALUATION & MANAGEMENT REFERRAL - [facility]  [phone] (Age 2 and up)          Today's Medication Changes          These changes are accurate as of: 12/4/17  1:24 PM.  If you have any questions, ask your nurse or doctor.               These medicines have changed or have updated prescriptions.        Dose/Directions    * cephALEXin 500 MG capsule   Commonly known as:  KEFLEX   This may have changed:  Another medication with the same name was added. Make sure you understand how and when to take each.   Used for:  Cellulitis   Changed by:  Matthias Sanchez MD        Dose:  500 mg   Take 1 capsule (500 mg) by mouth 3 times daily   Quantity:  30 capsule   Refills:  0       * cephALEXin 500 MG capsule   Commonly known as:  KEFLEX   This may have changed:  You were already taking a medication with the same name, and this prescription was added. Make sure you understand how and when to take each.   Used for:  Cellulitis of right lower extremity   Changed by:  Matthias Sanchez MD        Dose:  500 mg   Take 1 capsule (500 mg) by mouth 3 times daily   Quantity:  30 capsule   Refills:  0       * Notice:  This list has 2 medication(s) that are the same as other medications prescribed for you. Read the directions carefully, and ask your doctor or other care provider to review them with you.      Stop taking these medicines if you haven't already. Please contact your care team if you have questions.     zinc sulfate 220 (50 ZN) MG capsule   Commonly known as:  ZINCATE    Stopped by:  Matthias Sanchez MD                Where to get your medicines      These medications were sent to [id number] - [place], [place] - [address]  [address] [facility] [address]     Phone:  [phone]     cephALEXin 500 MG capsule                Primary Care Provider Office Phone # Fax #    Matthias Sanchez [phone]       [address]   [facility] [address]        Equal Access to Services     [facility] : Hadii aad ku hadasho Soomaali, waaxda luqadaha, qaybta kaalmada adeegyada, waxay idiin hayaan adeeg samarakunal lasteve . So [facility] [phone].    ATENCIÓN: Si habla español, tiene a kim disposición servicios gratuitos de asistencia lingüística. [facility] [phone].    We comply with applicable federal civil rights laws and [place] laws. We do not discriminate on the basis of race, color, national origin, age, disability, sex, sexual orientation, or gender identity.            Thank you!     Thank you for choosing [facility]  for your care. Our goal is always to provide you with excellent care. Hearing back from our patients is one way we can continue to improve our services. Please take a few minutes to complete the written survey that you may receive in the mail after your visit with us. Thank you!             Your Updated Medication List - Protect others around you: Learn how to safely use, store and throw away your medicines at www.disposemymeds.org.          This list is accurate as of: 12/4/17  1:24 PM.  Always use your most recent med list.                   Brand Name Dispense Instructions for use Diagnosis    aspirin 81 MG tablet     30 tablet    Take 1 tablet (81 mg) by mouth daily    Essential hypertension       atorvastatin 40 MG tablet    LIPITOR    30 tablet    Take 1 tablet (40 mg) by mouth daily    Type 2 diabetes mellitus without complication, without long-term current use of insulin (H)       * cephALEXin  500 MG capsule    KEFLEX    30 capsule    Take 1 capsule (500 mg) by mouth 3 times daily    Cellulitis       * cephALEXin 500 MG capsule    KEFLEX    30 capsule    Take 1 capsule (500 mg) by mouth 3 times daily    Cellulitis of right lower extremity       furosemide 20 MG tablet    LASIX    120 tablet    Take 2 tablets (40 mg) by mouth 2 times daily    Chronic systolic congestive heart failure (H)       ipratropium - albuterol 0.5 mg/2.5 mg/3 mL 0.5-2.5 (3) MG/3ML neb solution    DUONEB    360 mL    Take 1 vial (3 mLs) by nebulization every 4 hours as needed for wheezing    SOB (shortness of breath)       levothyroxine 150 MCG tablet    SYNTHROID/LEVOTHROID    30 tablet    Take 1 tablet (150 mcg) by mouth daily    Hypothyroidism, unspecified type       lisinopril 5 MG tablet    PRINIVIL/ZESTRIL    30 tablet    Take 0.5 tablets (2.5 mg) by mouth daily    Essential hypertension with goal blood pressure less than 140/90       metFORMIN 500 MG tablet    GLUCOPHAGE    60 tablet    Take 1 tablet (500 mg) by mouth 2 times daily (with meals)    Type 2 diabetes mellitus without complication, without long-term current use of insulin (H)       metoprolol 100 MG 24 hr tablet    TOPROL-XL    30 tablet    Take 1 tablet (100 mg) by mouth daily    Essential hypertension with goal blood pressure less than 140/90       multivitamin, therapeutic with minerals Tabs tablet     30 each    Take 1 tablet by mouth daily    Type 2 diabetes mellitus without complication, without long-term current use of insulin (H)       nystatin cream    MYCOSTATIN    30 g    Apply topically 3 times daily for 14 days    Candidal intertrigo       omega 3 1000 MG Caps     30 capsule    Take 1 g by mouth daily    Hyperlipidemia LDL goal <100       order for DME     2 each    Equipment being ordered: Other: Velcro Compression stockings.  Treatment Diagnosis: bilateral lymphedema.    Lymphedema of extremity       polyethylene glycol Packet    MIRALAX/GLYCOLAX     15 packet    Take 17 g by mouth daily as needed for constipation    Constipation, unspecified constipation type       potassium chloride SA 20 MEQ CR tablet    K-DUR/KLOR-CON M    60 tablet    Take 2 tablets (40 mEq) by mouth daily    Hypokalemia       senna-docusate 8.6-50 MG per tablet    SENOKOT-S;PERICOLACE    60 tablet    Take 1-2 tablets by mouth 2 times daily as needed for constipation    Constipation, unspecified constipation type       tamsulosin 0.4 MG capsule    FLOMAX    30 capsule    Take 1 capsule (0.4 mg) by mouth daily    Urinary retention       warfarin 5 MG tablet    COUMADIN    75 tablet    Take 2.5 tablets (12.5 mg) by mouth daily    Persistent atrial fibrillation (H)       * Notice:  This list has 2 medication(s) that are the same as other medications prescribed for you. Read the directions carefully, and ask your doctor or other care provider to review them with you.

## 2017-12-04 NOTE — PATIENT INSTRUCTIONS
1. Chronic systolic congestive heart failure (H)  Continue lasix 40mg twice a day    - Basic Metabolic Panel ([facility])  - N terminal pro BNP outpatient    2. Cellulitis of right lower extremity  Continue keflex for another 10days  OK to wrap Right leg now    - cephALEXin (KEFLEX) 500 MG capsule; Take 1 capsule (500 mg) by mouth 3 times daily  Dispense: 30 capsule; Refill: 0    3. Candidal intertrigo  contnue with cream all over the area    4. Persistent atrial fibrillation (H)  Checking your INR    - INR ([facility])    Next visit - 2 weeks

## 2017-12-04 NOTE — LETTER
December 5, 2017      Clarke Moreira  [address]  [facility] [address]        Dear Clarke,    Thank you for getting your care at [facility]. Please see below for your test results.  Your BNP (heart failure measure) has improved from 2000 ---> 1100. Good news.   Your INR was 2.9  Your potassium was 3.9    All good.    Resulted Orders   Basic Metabolic Panel ([facility])   Result Value Ref Range    Urea Nitrogen 11.8 7.0 - 21.0 mg/dL    Calcium 9.1 8.5 - 10.1 mg/dL    Chloride 97.3 (L) 98.0 - 110.0 mmol/L    Carbon Dioxide 28.6 20.0 - 32.0 mmol/L    Creatinine 0.7 0.7 - 1.3 mg/dL    Glucose 121.2 (H) 70.0 - 99.0 mg'dL    Potassium 3.9 3.3 - 4.5 mmol/dL    Sodium 138.3 132.6 - 141.4 mmol/L    GFR Estimate >90 >60.0 mL/min/1.7 m2    GFR Estimate If Black >90 >60.0 mL/min/1.7 m2   N terminal pro BNP outpatient   Result Value Ref Range    N-Terminal Pro Bnp 1101 (H) 0 - 125 pg/mL      Comment:         Reference range shown and results flagged as abnormal are for the outpatient,   non acute settings. Establishing a baseline value for each individual patient   is useful for follow-up.  Suggested inpatient cut points for confirming diagnosis of CHF in an acute   setting are:   >450 pg/mL (age 18 to less than 50)   >900 pg/mL (age 50 to less than 75)   >1800 pg/mL (75 yrs and older)  An inpatient or emergency department NT-proPBNP <300 pg/mL effectively rules   out acute CHF, with 99% negative predictive value.      INR ([facility])   Result Value Ref Range    INR >3.5       Comment:      Adult Normal Range: 0.90 - 1.10  Therapeutic Range: 2.0 - 3.5     INR   Result Value Ref Range    INR 2.90 (H) 0.86 - 1.14           Sincerely,    Matthias Sanchez MD

## 2017-12-05 ENCOUNTER — TELEPHONE (OUTPATIENT)
Dept: FAMILY MEDICINE | Facility: CLINIC | Age: 67
End: 2017-12-05

## 2017-12-05 LAB — NT-PROBNP SERPL-MCNC: 1101 PG/ML (ref 0–125)

## 2017-12-06 ASSESSMENT — PATIENT HEALTH QUESTIONNAIRE - PHQ9: SUM OF ALL RESPONSES TO PHQ QUESTIONS 1-9: 27

## 2017-12-07 ASSESSMENT — ANXIETY QUESTIONNAIRES: GAD7 TOTAL SCORE: 21

## 2017-12-08 NOTE — TELEPHONE ENCOUNTER
APPT INFO    Date /Time: 12/22/17 11AM   Reason for Appt: Needs Specially Constructed Shoes   Ref Provider/Clinic: Dr. Matthias Sanchez   Are there internal records? If yes, list: Yes - [facility]   Patient Contact (Y/N) & Call Details: No - internal referral   Action: --

## 2017-12-11 ENCOUNTER — TELEPHONE (OUTPATIENT)
Dept: FAMILY MEDICINE | Facility: CLINIC | Age: 67
End: 2017-12-11

## 2017-12-11 NOTE — TELEPHONE ENCOUNTER
[facility] phone call message- general phone call:    Reason for call: Tiana home care nurse, calling to advise that orders indicate patient is due for INR check today, but patient advised that he is not due until next Monday. Home care nurse calling to confirm when patient is due for INR check.    Return call needed: Yes    OK to leave a message on voice mail? Yes    Primary language: English      needed? No    Call taken on December 11, 2017 at 11:26 AM by Apryl Smith

## 2017-12-11 NOTE — TELEPHONE ENCOUNTER
PHARMACY TELEPHONE ENCOUNTER:    Reason: INR orders       Home care nurse calls today to understand the plan for INR monitoring.  INR not needed now and may be rechecked at next [place]'s apt. 12/21/17  I have asked if homecare would be able to do home INRs going forward, but Clarke will likely only have home care while wound is healing and not longterm.    Will discuss plan for INR management at next PCP apt.       Tj Palumbo, Pharm.D.

## 2017-12-14 ENCOUNTER — PRE VISIT (OUTPATIENT)
Dept: CARDIOLOGY | Facility: CLINIC | Age: 67
End: 2017-12-14

## 2017-12-14 NOTE — TELEPHONE ENCOUNTER
9/24/17--Echocardiogram  Interpretation Summary  LVEF is probably normal on limited contrasct-enhanced images.  On limited views, RV function is probably mildly reduced.  Pulmonary artery systolic pressure cannot be assessed.  The inferior vena cava is dilated at 3.34 cm without respiratory variability,  consistent with increased right atrial pressure. Estimated right atrial  pressure is > 15 mmHg.  No pericardial effusion is present.

## 2017-12-18 ENCOUNTER — TELEPHONE (OUTPATIENT)
Dept: FAMILY MEDICINE | Facility: CLINIC | Age: 67
End: 2017-12-18

## 2017-12-18 DIAGNOSIS — I48.19 PERSISTENT ATRIAL FIBRILLATION (H): ICD-10-CM

## 2017-12-18 RX ORDER — WARFARIN SODIUM 5 MG/1
12.5 TABLET ORAL DAILY
Qty: 75 TABLET | Refills: 11 | Status: SHIPPED | OUTPATIENT
Start: 2017-12-18 | End: 2019-01-15

## 2017-12-18 NOTE — TELEPHONE ENCOUNTER
Request for medication refill:    Date of last visit at clinic: 12/4/17    Please complete refill if appropriate and CLOSE ENCOUNTER.    Closing the encounter signifies the refill is complete.    If refill has been denied, please complete the smart phrase .smirefuse and route it to the [facility] RN TRIAGE pool to inform the patient and the pharmacy.    Rachel Angeles, RN

## 2017-12-18 NOTE — TELEPHONE ENCOUNTER
[facility] phone call message- general phone call:    Reason for call: Stanley is calling on behalf of the patient to report that the cellulitis in both legs have improved. She states that the patient's lymph edema is also improving but that there is fluid still in the patient's torso and arms however the face is smaller. No call back is needed.    Return call needed: No    OK to leave a message on voice mail? Yes    Primary language: English      needed? No    Call taken on December 18, 2017 at 3:39 PM by Landen Coronado

## 2017-12-18 NOTE — TELEPHONE ENCOUNTER
Persistent neuropathic pain in both feet especially in the toes.  The pain is less due to the edema decreasing.  New pain in left thigh due to sitting and his back.  Can we consider gabapentin?

## 2017-12-21 ENCOUNTER — OFFICE VISIT (OUTPATIENT)
Dept: FAMILY MEDICINE | Facility: CLINIC | Age: 67
End: 2017-12-21
Payer: COMMERCIAL

## 2017-12-21 VITALS
WEIGHT: 315 LBS | TEMPERATURE: 98.3 F | DIASTOLIC BLOOD PRESSURE: 75 MMHG | SYSTOLIC BLOOD PRESSURE: 113 MMHG | BODY MASS INDEX: 57.21 KG/M2 | RESPIRATION RATE: 18 BRPM | OXYGEN SATURATION: 96 % | HEART RATE: 96 BPM

## 2017-12-21 DIAGNOSIS — I89.0 LYMPHEDEMA: ICD-10-CM

## 2017-12-21 DIAGNOSIS — L03.115 CELLULITIS OF RIGHT LOWER EXTREMITY: ICD-10-CM

## 2017-12-21 DIAGNOSIS — B37.2 CANDIDAL INTERTRIGO: ICD-10-CM

## 2017-12-21 DIAGNOSIS — I50.22 CHRONIC SYSTOLIC CONGESTIVE HEART FAILURE (H): Primary | ICD-10-CM

## 2017-12-21 DIAGNOSIS — I48.19 PERSISTENT ATRIAL FIBRILLATION (H): ICD-10-CM

## 2017-12-21 LAB
BUN SERPL-MCNC: 12.8 MG/DL (ref 7–21)
CALCIUM SERPL-MCNC: 9.2 MG/DL (ref 8.5–10.1)
CHLORIDE SERPLBLD-SCNC: 100.5 MMOL/L (ref 98–110)
CO2 SERPL-SCNC: 30.4 MMOL/L (ref 20–32)
CREAT SERPL-MCNC: 0.7 MG/DL (ref 0.7–1.3)
GFR SERPL CREATININE-BSD FRML MDRD: >90 ML/MIN/1.7 M2
GLUCOSE SERPL-MCNC: 132.9 MG'DL (ref 70–99)
INR PPP: 2.4
NT-PROBNP SERPL-MCNC: 1738 PG/ML (ref 0–125)
POTASSIUM SERPL-SCNC: 3.7 MMOL/DL (ref 3.3–4.5)
SODIUM SERPL-SCNC: 135.6 MMOL/L (ref 132.6–141.4)

## 2017-12-21 RX ORDER — SPIRONOLACTONE 25 MG/1
25 TABLET ORAL 2 TIMES DAILY
Qty: 60 TABLET | Refills: 3 | Status: SHIPPED | OUTPATIENT
Start: 2017-12-21 | End: 2018-04-26

## 2017-12-21 NOTE — LETTER
December 22, 2017      Clarke Moreira  [address]  [place] [address]        Dear Clarke,    Thank you for getting your care at [facility]. Please see below for your test results.  Your electrolytes are good  Your INR is good  Your BNP (Heart failure test) is higher than last time. This matches your experience.  [facility] should help with this    Resulted Orders   INR ([facility])   Result Value Ref Range    INR 2.4       Comment:      Adult Normal Range: 0.90 - 1.10  Therapeutic Range: 2.0 - 3.5     Basic Metabolic Panel ([facility])   Result Value Ref Range    Urea Nitrogen 12.8 7.0 - 21.0 mg/dL    Calcium 9.2 8.5 - 10.1 mg/dL    Chloride 100.5 98.0 - 110.0 mmol/L    Carbon Dioxide 30.4 20.0 - 32.0 mmol/L    Creatinine 0.7 0.7 - 1.3 mg/dL    Glucose 132.9 (H) 70.0 - 99.0 mg'dL    Potassium 3.7 3.3 - 4.5 mmol/dL    Sodium 135.6 132.6 - 141.4 mmol/L    GFR Estimate >90 >60.0 mL/min/1.7 m2    GFR Estimate If Black >90 >60.0 mL/min/1.7 m2   N terminal pro BNP outpatient   Result Value Ref Range    N-Terminal Pro Bnp 1738 (H) 0 - 125 pg/mL      Comment:         Reference range shown and results flagged as abnormal are for the outpatient,   non acute settings. Establishing a baseline value for each individual patient   is useful for follow-up.  Suggested inpatient cut points for confirming diagnosis of CHF in an acute   setting are:   >450 pg/mL (age 18 to less than 50)   >900 pg/mL (age 50 to less than 75)   >1800 pg/mL (75 yrs and older)  An inpatient or emergency department NT-proPBNP <300 pg/mL effectively rules   out acute CHF, with 99% negative predictive value.              Sincerely,    Matthias Sanchez MD

## 2017-12-21 NOTE — PATIENT INSTRUCTIONS
1. Chronic systolic congestive heart failure (H)  Need to get rid of leg and abdominal fluid  Continue with lasix 40mg twice a day  Add spironolactone 25mg twice a day    - spironolactone (ALDACTONE) 25 MG tablet; Take 1 tablet (25 mg) by mouth 2 times daily  Dispense: 60 tablet; Refill: 3    - Basic Metabolic Panel ([facility])  - N terminal pro BNP outpatient      2. Persistent atrial fibrillation (H)  INR is 2.4.   Continue with present dose  - INR ([facility])    3. Cellulitis of right lower extremity  Improved. Off antibiotics    4. Candidal intertrigo  Improved. Continue with cream    PHarper

## 2017-12-21 NOTE — PROGRESS NOTES
HPI  67 year old male here for evaluation of several issues.    Has new hospital bed. Still problems to fix so not using to the maximum  Recliner lift chair is life changing  Transportation glitches being resolved. Has appt with [facility], Sleep, podiatry next week    Worse with wt and swelling  Less Urine output with lasix 40 BID    Cellulitis - better  Lymphedema - getting wrapped regularly, but worse recently  Pannus bigger, more tender    No CP, SOB, cough,         ROS  6 point ROS neg other than the symptoms noted above in the HPI.    MEDS  Current Outpatient Prescriptions   Medication     warfarin (COUMADIN) 5 MG tablet     cephALEXin (KEFLEX) 500 MG capsule     furosemide (LASIX) 20 MG tablet     cephALEXin (KEFLEX) 500 MG capsule     potassium chloride SA (K-DUR/KLOR-CON M) 20 MEQ CR tablet     senna-docusate (SENOKOT-S;PERICOLACE) 8.6-50 MG per tablet     levothyroxine (SYNTHROID/LEVOTHROID) 150 MCG tablet     lisinopril (PRINIVIL/ZESTRIL) 5 MG tablet     metFORMIN (GLUCOPHAGE) 500 MG tablet     metoprolol (TOPROL-XL) 100 MG 24 hr tablet     multivitamin, therapeutic with minerals (MULTI-VITAMIN) TABS tablet     omega 3 1000 MG CAPS     polyethylene glycol (MIRALAX/GLYCOLAX) Packet     tamsulosin (FLOMAX) 0.4 MG capsule     order for DME     aspirin 81 MG tablet     atorvastatin (LIPITOR) 40 MG tablet     ipratropium - albuterol 0.5 mg/2.5 mg/3 mL (DUONEB) 0.5-2.5 (3) MG/3ML neb solution     No current facility-administered medications for this visit.          SOC      FHx  Family History   Problem Relation Age of Onset     Depression Mother      CANCER No family hx of      No family history of skin cancer       PHYSICAL EXAMINATION:  Vital signs: /75  Pulse 96  Temp 98.3  F (36.8  C) (Oral)  Resp 18  Wt (!) 421 lb 12.8 oz (191.3 kg)  SpO2 96%  BMI 57.21 kg/m2    Wt Readings from Last 5 Encounters:   12/21/17 (!) 421 lb 12.8 oz (191.3 kg)   12/04/17 (!) 409 lb (185.5 kg)   11/27/17 (!) 437 lb (198.2  kg)   11/10/17 (!) 432 lb 3.2 oz (196 kg)   10/27/17 (!) 401 lb (181.9 kg)         Gen: no distress  Lungs: clear  Cor: irreg/irreg, no S3 S4 murmur or rub  Abd: large pannus, firm/cobblestone feel, less pink than last time  Ext: wrapped to thighs. Enlarging  Feet: in make-shift shoes from cast shoes  MSE: clear, appropriate, actually positive and bright        LABS    Results for orders placed or performed in visit on 12/21/17   INR ([facility])   Result Value Ref Range    INR 2.4    Basic Metabolic Panel ([facility])   Result Value Ref Range    Urea Nitrogen 12.8 7.0 - 21.0 mg/dL    Calcium 9.2 8.5 - 10.1 mg/dL    Chloride 100.5 98.0 - 110.0 mmol/L    Carbon Dioxide 30.4 20.0 - 32.0 mmol/L    Creatinine 0.7 0.7 - 1.3 mg/dL    Glucose 132.9 (H) 70.0 - 99.0 mg'dL    Potassium 3.7 3.3 - 4.5 mmol/dL    Sodium 135.6 132.6 - 141.4 mmol/L    GFR Estimate >90 >60.0 mL/min/1.7 m2    GFR Estimate If Black >90 >60.0 mL/min/1.7 m2   N terminal pro BNP outpatient   Result Value Ref Range    N-Terminal Pro Bnp 1738 (H) 0 - 125 pg/mL         ASSESSMENT/PLAN    Actually looks better despite some slippage in CHF  Using new bed and new recliner lift chair  Transportation issues under better control to get to appts      1. Chronic systolic congestive heart failure (H)  Significant wt gain in last 2 weeks (12#)  Less output from lasix 40mg BID  BNP worse - 1101 -->1738   Will add spironolactone  [facility] clinic evaluation next week    - spironolactone (ALDACTONE) 25 MG tablet; Take 1 tablet (25 mg) by mouth 2 times daily  Dispense: 60 tablet; Refill: 3  - Basic Metabolic Panel ([facility])  - N terminal pro BNP outpatient  - Basic Metabolic Panel ([facility])  - N terminal pro BNP outpatient  - spironolactone (ALDACTONE) 25 MG tablet; Take 1 tablet (25 mg) by mouth 2 times daily  Dispense: 60 tablet; Refill: 3    2. Lymphedema  Worse over the last 2 weeks  Legs wrapped  Pannus also worse  Has New bed but still not able to use effectively yet      3. Persistent atrial fibrillation (H)  INR 2.4. Continue present coumadin dose 12.5mg QD  - INR ([facility])    4. Cellulitis of right lower extremity  Improved. Off antibiotics    5. Candidal intertrigo -  pannus  Improved. Continue nystatin cream.    6. RTC 2 weeks  If not improving lymphedema / wt, consider hospitalization  In the interim - appt with [facility], podiatry, sleep clinic          ANAYELI CEJA  labs

## 2017-12-21 NOTE — MR AVS SNAPSHOT
After Visit Summary   12/21/2017    Clarke Moreira    MRN: [id number]           Patient Information     Date Of Birth          1950        Visit Information        Provider Department      12/21/2017 10:20 AM Matthias Sanchez MD [facility]        Today's Diagnoses     Chronic systolic congestive heart failure (H)    -  1    Persistent atrial fibrillation (H)        Cellulitis of right lower extremity        Candidal intertrigo        Screening for condition          Care Instructions    1. Chronic systolic congestive heart failure (H)  Need to get rid of leg and abdominal fluid  Continue with lasix 40mg twice a day  Add spironolactone 25mg twice a day    - spironolactone (ALDACTONE) 25 MG tablet; Take 1 tablet (25 mg) by mouth 2 times daily  Dispense: 60 tablet; Refill: 3    - Basic Metabolic Panel ([facility])  - N terminal pro BNP outpatient      2. Persistent atrial fibrillation (H)  INR is 2.4.   Continue with present dose  - INR ([facility])    3. Cellulitis of right lower extremity  Improved. Off antibiotics    4. Candidal intertrigo  Improved. Continue with cream    PHarper              Follow-ups after your visit        Your next 10 appointments already scheduled     Dec 22, 2017 11:00 AM CST   (Arrive by 10:45 AM)   NEW FOOT/ANKLE with Jesús Lagos DPM   [facility] ([facility])    [address] [place] [address]  4th [facility] [id number] [phone]            Dec 26, 2017  9:30 AM CST   (Arrive by 9:15 AM)   NEW HEART FAILURE with Christian Willoughby MD   [facility] ([facility])    [address] [place] [id number] [phone]            Dec 29, 2017 10:00 AM CST   New Sleep Patient with REAGAN Claros [facility] ([facility])    [address] [place] [address]  [id number] [phone]              Who to contact     Please call your clinic at [phone] to:    Ask questions about your health    Make or cancel appointments    Discuss your medicines    Learn about your test results    Speak to your doctor   If you have compliments or concerns about an experience at your clinic, or if you wish to file a complaint, please contact [facility] Patient Relations at [phone] or email us at [email or web address]         Additional Information About Your Visit        Gather Information     Gather is an electronic gateway that provides easy, online access to your medical records. With Gather, you can request a clinic appointment, read your test results, renew a prescription or communicate with your care team.     To sign up for Gather visit the website at [email or web address]   You will be asked to enter the access code listed below, as well as some personal information. Please follow the directions to create your username and password.     Your access code is: [email or web address]  Expires: 2018 12:33 PM     Your access code will  in 90 days. If you need help or a new code, please contact your [facility] or call [phone] for assistance.        Care EveryWhere ID     This is your Care EveryWhere ID. This could be used by other organizations to access your [facility] medical records  [id number]        Your Vitals Were     Pulse Temperature Respirations Pulse Oximetry BMI (Body Mass Index)       96 98.3  F (36.8  C) (Oral) 18 96% 57.21 kg/m2        Blood Pressure from Last 3 Encounters:   17 113/75   17 124/84   17 126/78    Weight from Last 3 Encounters:   17 (!) 421 lb 12.8 oz (191.3 kg)   17 (!) 409 lb (185.5 kg)   17 (!) 437 lb (198.2 kg)              We Performed the Following     Basic Metabolic Panel ([facility])     INR ([facility])     N terminal pro BNP  outpatient          Today's Medication Changes          These changes are accurate as of: 12/21/17 10:52 AM.  If you have any questions, ask your nurse or doctor.               Start taking these medicines.        Dose/Directions    spironolactone 25 MG tablet   Commonly known as:  ALDACTONE   Used for:  Chronic systolic congestive heart failure (H)   Started by:  Matthias Sanchez MD        Dose:  25 mg   Take 1 tablet (25 mg) by mouth 2 times daily   Quantity:  60 tablet   Refills:  3            Where to get your medicines      These medications were sent to [id number] - [place], [place] - [address]  [address] [facility] [address]     Phone:  [phone]     spironolactone 25 MG tablet                Primary Care Provider Office Phone # Fax #    Matthias Sanchez [phone]       [address]  [facility] [id number]        Equal Access to Services     [facility]: Hadii kell tomo Sodillan, waaxda luqadaha, qaybta kaalmada adenegrayada, vasyl casarez . So [facility] [phone].    ATENCIÓN: Si habla español, tiene a kim disposición servicios gratuitos de asistencia lingüística. Alexis al [phone].    We comply with applicable federal civil rights laws and [place] laws. We do not discriminate on the basis of race, color, national origin, age, disability, sex, sexual orientation, or gender identity.            Thank you!     Thank you for choosing [facility]  for your care. Our goal is always to provide you with excellent care. Hearing back from our patients is one way we can continue to improve our services. Please take a few minutes to complete the written survey that you may receive in the mail after your visit with us. Thank you!             Your Updated Medication List - Protect others around you: Learn how to safely use, store and throw away your medicines at www.disposemymeds.org.          This list is  accurate as of: 12/21/17 10:52 AM.  Always use your most recent med list.                   Brand Name Dispense Instructions for use Diagnosis    aspirin 81 MG tablet     30 tablet    Take 1 tablet (81 mg) by mouth daily    Essential hypertension       atorvastatin 40 MG tablet    LIPITOR    30 tablet    Take 1 tablet (40 mg) by mouth daily    Type 2 diabetes mellitus without complication, without long-term current use of insulin (H)       * cephALEXin 500 MG capsule    KEFLEX    30 capsule    Take 1 capsule (500 mg) by mouth 3 times daily    Cellulitis       * cephALEXin 500 MG capsule    KEFLEX    30 capsule    Take 1 capsule (500 mg) by mouth 3 times daily    Cellulitis of right lower extremity       furosemide 20 MG tablet    LASIX    120 tablet    Take 2 tablets (40 mg) by mouth 2 times daily    Chronic systolic congestive heart failure (H)       ipratropium - albuterol 0.5 mg/2.5 mg/3 mL 0.5-2.5 (3) MG/3ML neb solution    DUONEB    360 mL    Take 1 vial (3 mLs) by nebulization every 4 hours as needed for wheezing    SOB (shortness of breath)       levothyroxine 150 MCG tablet    SYNTHROID/LEVOTHROID    30 tablet    Take 1 tablet (150 mcg) by mouth daily    Hypothyroidism, unspecified type       lisinopril 5 MG tablet    PRINIVIL/ZESTRIL    30 tablet    Take 0.5 tablets (2.5 mg) by mouth daily    Essential hypertension with goal blood pressure less than 140/90       metFORMIN 500 MG tablet    GLUCOPHAGE    60 tablet    Take 1 tablet (500 mg) by mouth 2 times daily (with meals)    Type 2 diabetes mellitus without complication, without long-term current use of insulin (H)       metoprolol 100 MG 24 hr tablet    TOPROL-XL    30 tablet    Take 1 tablet (100 mg) by mouth daily    Essential hypertension with goal blood pressure less than 140/90       multivitamin, therapeutic with minerals Tabs tablet     30 each    Take 1 tablet by mouth daily    Type 2 diabetes mellitus without complication, without long-term  current use of insulin (H)       omega 3 1000 MG Caps     30 capsule    Take 1 g by mouth daily    Hyperlipidemia LDL goal <100       order for DME     2 each    Equipment being ordered: Other: Velcro Compression stockings.  Treatment Diagnosis: bilateral lymphedema.    Lymphedema of extremity       polyethylene glycol Packet    MIRALAX/GLYCOLAX    15 packet    Take 17 g by mouth daily as needed for constipation    Constipation, unspecified constipation type       potassium chloride SA 20 MEQ CR tablet    K-DUR/KLOR-CON M    60 tablet    Take 2 tablets (40 mEq) by mouth daily    Hypokalemia       senna-docusate 8.6-50 MG per tablet    SENOKOT-S;PERICOLACE    60 tablet    Take 1-2 tablets by mouth 2 times daily as needed for constipation    Constipation, unspecified constipation type       spironolactone 25 MG tablet    ALDACTONE    60 tablet    Take 1 tablet (25 mg) by mouth 2 times daily    Chronic systolic congestive heart failure (H)       tamsulosin 0.4 MG capsule    FLOMAX    30 capsule    Take 1 capsule (0.4 mg) by mouth daily    Urinary retention       warfarin 5 MG tablet    COUMADIN    75 tablet    Take 2.5 tablets (12.5 mg) by mouth daily    Persistent atrial fibrillation (H)       * Notice:  This list has 2 medication(s) that are the same as other medications prescribed for you. Read the directions carefully, and ask your doctor or other care provider to review them with you.

## 2017-12-22 ENCOUNTER — PRE VISIT (OUTPATIENT)
Dept: ORTHOPEDICS | Facility: CLINIC | Age: 67
End: 2017-12-22

## 2017-12-22 ENCOUNTER — PRE VISIT (OUTPATIENT)
Dept: CARDIOLOGY | Facility: CLINIC | Age: 67
End: 2017-12-22

## 2017-12-22 NOTE — PROGRESS NOTES
Christian Willoughby M.D.  Cardiovascular Medicine    I personally saw and examined this patient, discussed care with housestaff and other consultants, reviewed current laboratories and imaging studies, and conveyed impression and diagnostic/therapeutic plan to patient.    Problem List  1, Chronic systolic heart failure  2. Morbid obesity  3. Sleep disordered breathing  4. Atrial fibrillation  5. Hyperlipidemia  6. Multi-faceted shortness of breath  7. Anxiety/depression  8. Type II DM  9. Warfarin anticoagulation  10. Allergies: none  11. Lymphedema  12. Hypertension      Referring:     Matthias Sanchez M.D.  Rebekah Reddy - [facility]      History    67 year old male seen for evaluation and treatment regarding congestive heart failure in the context of morbid obesity.     There is interim history of increasing shortness of breath, orthopnea, PND, ankle edema, increasing abdominal girth,but no palpitation, pre-syncope, syncope, bleeding or thromboembolic complications.  The patient is taking medication regularly, following a low salt diet, and exercising regularly as outlined.    Alert, oriented, normal gait and station, normal mental, JVP within normal limits, HJR negative,thyroid not enlarged, mucous membranes clear, abdomen without tenderness, rebound or guarding, skin clear of lesion, PMI normal, S1 variable  S2 regular rhythm, no gallop, no edema    Occupational: middle management    Longstanding depression    Family: orphan    Allergy: none    Surgery: broken bones    Has bariatric bed and chair - need maintenance    REVIEW of SYMPTOMS    Constitutional: without fever, chills, night sweats.  Weight is up 20 pounds over last several months  HEENT: without dry eyes, dry mouth, sinusitis, corryza, visual changes  Endocrine: without polyuria, polydypsia, polyphagia, heat or cold intolerance, changing mental status. Hemoglobin A!C modestly elevated  Cardiology: without chest pain, tightness, heaviness,  pressure, paroxysmal dyspnea, orthopnea, palpitation, pre-syncope or syncope or device discharge (if present)  Pulmonary: without asthma, wheezing, cough, hemoptysis  GI: without nausea, emesis, jaundice, pain, hematemesis, melena  : without frequency, urgency, hematuria, stones, pain, abnormal bleeding, frequency, urgency but history of urinary retention requiring catherization   Neurologic: without TIA, CVA, trauma, seizure  Dermatologic: without lesions, abrasion rash,   Orthopedic/Rheum: without significant joint pain, impairment, limb, polyserositis, ulceration, Raynauds  Heme: without mass, bruising, frequent infection, anemia  Psychiatric: without substance abuse, hallucination, medication, depression    Exercise tolerance: reduced, walks with walker    Objective    Constitutional: alert, oriented,abnormal gait and station, normal mentation.  Oral: moist mucous membranes  Lymph: without pathologic adenopathy  Chest: clear to ausculation and percussion  Cor: No evidence of left or right ventricular activity.  Rhythm is regular.  S1 variablel, S2 split physiologically. Murmurs are not present  Abdomen: without tenderness, rebound, guarding, masses, ascites  Extremities: Edema not present  Neuro: no focal defects, normal mentation  Skin: without open lesions  Psych: oriented, verbal, mental status in tact  Wt Readings from Last 24 Encounters:   12/21/17 (!) 191.3 kg (421 lb 12.8 oz)   12/04/17 (!) 185.5 kg (409 lb)   11/27/17 (!) 198.2 kg (437 lb)   11/10/17 (!) 196 kg (432 lb 3.2 oz)   10/27/17 (!) 181.9 kg (401 lb)   10/07/17 (!) 184 kg (405 lb 10.3 oz)   08/14/17 (!) 191 kg (421 lb)   09/28/16 (!) 187.3 kg (413 lb)   09/14/16 (!) 187.4 kg (413 lb 3.2 oz)   08/01/14 (!) 189.4 kg (417 lb 9.6 oz)   06/07/13 (!) 192 kg (423 lb 4.8 oz)   03/13/13 (!) 191.7 kg (422 lb 9.6 oz)         Meds    Currently taking furosemide 40BID  Current Outpatient Prescriptions   Medication     spironolactone (ALDACTONE) 25 MG  tablet     warfarin (COUMADIN) 5 MG tablet     furosemide (LASIX) 20 MG tablet     potassium chloride SA (K-DUR/KLOR-CON M) 20 MEQ CR tablet     senna-docusate (SENOKOT-S;PERICOLACE) 8.6-50 MG per tablet     levothyroxine (SYNTHROID/LEVOTHROID) 150 MCG tablet     lisinopril (PRINIVIL/ZESTRIL) 5 MG tablet     metFORMIN (GLUCOPHAGE) 500 MG tablet     metoprolol (TOPROL-XL) 100 MG 24 hr tablet     multivitamin, therapeutic with minerals (MULTI-VITAMIN) TABS tablet     omega 3 1000 MG CAPS     polyethylene glycol (MIRALAX/GLYCOLAX) Packet     tamsulosin (FLOMAX) 0.4 MG capsule     order for DME     aspirin 81 MG tablet     atorvastatin (LIPITOR) 40 MG tablet     ipratropium - albuterol 0.5 mg/2.5 mg/3 mL (DUONEB) 0.5-2.5 (3) MG/3ML neb solution     No current facility-administered medications for this visit.        Labs  Results for AJIT CALLAHAN (MRN [id number]) as of 12/22/2017 16:27   Ref. Range 11/10/2017 11:12 11/10/2017 16:08 11/27/2017 11:03 11/27/2017 12:09 11/27/2017 12:52 12/4/2017 11:56 12/4/2017 12:43 12/21/2017 10:10   Sodium Latest Ref Range: 132.6 - 141.4 mmol/L 136.7  137.1   138.3  135.6   Potassium Latest Ref Range: 3.3 - 4.5 mmol/dL 3.7  4.1   3.9  3.7   Chloride Latest Ref Range: 98.0 - 110.0 mmol/L 96.6 (L)  99.1   97.3 (L)  100.5   Carbon Dioxide Latest Ref Range: 20.0 - 32.0 mmol/L 26.8  29.7   28.6  30.4   Urea Nitrogen Latest Ref Range: 7.0 - 21.0 mg/dL 3.3 (L)  6.3 (L)   11.8  12.8   Creatinine Latest Ref Range: 0.7 - 1.3 mg/dL 0.7  0.7   0.7  0.7   GFR Estimate Latest Ref Range: >60.0 mL/min/1.7 m2   >90   >90  >90   GFR Estimate If Black Latest Ref Range: >60.0 mL/min/1.7 m2   >90   >90  >90   Calcium Latest Ref Range: 8.5 - 10.1 mg/dL 8.6  8.9   9.1  9.2   Albumin Latest Ref Range: 3.5 - 4.7 mg/dL 3.6          Protein Total Latest Ref Range: 6.8 - 8.8 g/dL 6.7 (L)          Bilirubin Total Latest Ref Range: 0.2 - 1.3 mg/dL 1.0          Alkaline Phosphatase Latest Ref Range: 31.7 - 110.7  U/L 88.3          ALT Latest Ref Range: 0.0 - 45.0 U/L 15.6          AST Latest Ref Range: 0.0 - 55.0 U/L 14.0          Hemoglobin A1C Latest Ref Range: 4.1 - 5.7 %   6.3 (H)        N-Terminal Pro Bnp Latest Ref Range: 0 - 125 pg/mL    2008 (H)   1101 (H) 1738 (H)   T4 Free Latest Ref Range: 0.76 - 1.46 ng/dL  1.60 (H)         TSH Latest Ref Range: 0.40 - 4.00 mU/L  4.89 (H)         Glucose Latest Ref Range: 70.0 - 99.0 mg'dL 120.8 (H)  128.7 (H)   121.2 (H)  132.9 (H)   WBC Latest Ref Range: 4.0 - 11.0 K/uL 7.4          Hemoglobin Latest Ref Range: 13.3 - 17.7 g/dL 14.6          Hematocrit Latest Ref Range: 40.0 - 53.0 % 47.0          Platelets Latest Ref Range: 150.0 - 450.0 K/uL 211.0          RBC Latest Ref Range: 4.40 - 5.90 M/uL 4.85          MCV Latest Ref Range: 78.0 - 100.0 fL 97.0          MCH Latest Ref Range: 26.5 - 35.0 pg 30.1          MCHC Latest Ref Range: 32.0 - 36.0 g/dL 31.1 (L)          % Lymphocytes Latest Ref Range: 20.0 - 48.0 %L 18.1 (L)          % Granulocytes Latest Ref Range: 40.0 - 75.0 %G 74.8          Mid % Latest Ref Range: 0.0 - 20.0 %M 7.1          GRANULOCYTES # Latest Ref Range: 1.6 - 8.3 K/uL 5.5          Lymphocytes # Latest Ref Range: 0.8 - 5.3 K/uL 1.3          Mid # Latest Ref Range: 0.0 - 2.2 K/uL 0.5          INR Unknown 2.7    2.89 (H) >3.5 2.90 (H) 2.4   Hepatitis C Antibody Latest Ref Range: NR^Nonreactive    Nonreactive          Imaging   Name: AJIT CALLAHAN  MRN: [id number]  : 1950  Study Date: 2017 08:49 AM  Age: 67 yrs  Gender: Male  [place] [facility]  Reason For Study: Pulmonary Hypertension  Ordering Physician: LEWIS BALTAZAR  Performed By: Bernard Snider RDCS     BSA: 3.0 m2  Height: 71 in  Weight: 444 lb  HR: 110  BP: 87/61 mmHg  _____________________________________________________________________________  __        Procedure  Limited Portable Echo Adult. Contrast Lumason. Lumason (NDC #7895-7793-65)  given intravenously. Patient was  given 5ml mixture of Lumason. 0 ml wasted.  _____________________________________________________________________________  __        Interpretation Summary  LVEF is probably normal on limited contrasct-enhanced images.  On limited views, RV function is probably mildly reduced.  Pulmonary artery systolic pressure cannot be assessed.  The inferior vena cava is dilated at 3.34 cm without respiratory variability, consistent with increased right atrial pressure. Estimated right atrial pressure is > 15 mmHg.  No pericardial effusion is present.  _____________________________________________________________________________    Exam: XR CHEST PORT 1 VW, 10/4/2017 9:39 AM  Weight around 404     Indication: Follow up CXR on pulmonary edema     Comparison: Chest radiograph 9/26/2017.     Findings:   Single AP view of the chest dated degrees. Right upper extremity PICC  in stable position with tip in the mid SVC. The visualized upper  abdomen is unremarkable. Left-sided rib fractures. Soft tissues are  unremarkable. The trachea is midline. The cardiomediastinal silhouette  is within normal limits. Interval resolution of bilateral pleural  effusions and associated bibasilar atelectasis and consolidation. Mild  interval decrease in pulmonary vascular congestion.         Impression:   1. Interval resolution bilateral pleural effusions and associated  bibasilar atelectasis and/or consolidation.  2. Mild interval decrease in pulmonary vascular congestion.     I have personally reviewed the examination and initial interpretation  and I agree with the findings.    Assessment/Plan     The etiology of the patients heart failure is likely multi-factorial relating to abnormalities of relations, obesity, alveolar hypoventilation.    The patient's weight is currently quite high and probably would benefit from diuresis to below 400 pounds and right heart catherization to define point of lesions.    Discussed in-patient right heart  catherization directed therapy vis a vis twice a week clinic visits for laboratories   Will obtain new baseline laboratories to see if home treatment is an option.   There is a high likelihood that he will develop urinary retention that will require further evaluation and possible self cathing under urologoic supervision    Needs therapist and to call Dilia Greene at [facility]    Will call patient after laboratories available    May well need PPV during day until weight /volume adjusted.  Will see sleep on Friday    CC  Matthias Sanchez  [facility]  Dilia Greene, Ph.D. [facility]

## 2017-12-26 ENCOUNTER — TELEPHONE (OUTPATIENT)
Dept: FAMILY MEDICINE | Facility: CLINIC | Age: 67
End: 2017-12-26

## 2017-12-26 ENCOUNTER — OFFICE VISIT (OUTPATIENT)
Dept: CARDIOLOGY | Facility: CLINIC | Age: 67
End: 2017-12-26
Attending: INTERNAL MEDICINE
Payer: COMMERCIAL

## 2017-12-26 VITALS
DIASTOLIC BLOOD PRESSURE: 81 MMHG | HEIGHT: 72 IN | WEIGHT: 315 LBS | SYSTOLIC BLOOD PRESSURE: 145 MMHG | OXYGEN SATURATION: 94 % | HEART RATE: 102 BPM | BODY MASS INDEX: 42.66 KG/M2

## 2017-12-26 DIAGNOSIS — I50.22 CHRONIC SYSTOLIC CONGESTIVE HEART FAILURE (H): ICD-10-CM

## 2017-12-26 LAB
ALBUMIN SERPL-MCNC: 3.3 G/DL (ref 3.4–5)
ALP SERPL-CCNC: 97 U/L (ref 40–150)
ALT SERPL W P-5'-P-CCNC: 15 U/L (ref 0–70)
ANION GAP SERPL CALCULATED.3IONS-SCNC: 9 MMOL/L (ref 3–14)
AST SERPL W P-5'-P-CCNC: 22 U/L (ref 0–45)
BILIRUB SERPL-MCNC: 1 MG/DL (ref 0.2–1.3)
BUN SERPL-MCNC: 12 MG/DL (ref 7–30)
CALCIUM SERPL-MCNC: 8.8 MG/DL (ref 8.5–10.1)
CHLORIDE SERPL-SCNC: 105 MMOL/L (ref 94–109)
CO2 SERPL-SCNC: 25 MMOL/L (ref 20–32)
CREAT SERPL-MCNC: 0.68 MG/DL (ref 0.66–1.25)
ERYTHROCYTE [DISTWIDTH] IN BLOOD BY AUTOMATED COUNT: 15.5 % (ref 10–15)
GFR SERPL CREATININE-BSD FRML MDRD: >90 ML/MIN/1.7M2
GLUCOSE SERPL-MCNC: 126 MG/DL (ref 70–99)
HCT VFR BLD AUTO: 44.5 % (ref 40–53)
HGB BLD-MCNC: 13.4 G/DL (ref 13.3–17.7)
IRON SATN MFR SERPL: 17 % (ref 15–46)
IRON SERPL-MCNC: 60 UG/DL (ref 35–180)
MCH RBC QN AUTO: 28.7 PG (ref 26.5–33)
MCHC RBC AUTO-ENTMCNC: 30.1 G/DL (ref 31.5–36.5)
MCV RBC AUTO: 95 FL (ref 78–100)
NT-PROBNP SERPL-MCNC: 1590 PG/ML (ref 0–125)
PLATELET # BLD AUTO: 188 10E9/L (ref 150–450)
POTASSIUM SERPL-SCNC: 4 MMOL/L (ref 3.4–5.3)
PROT SERPL-MCNC: 8 G/DL (ref 6.8–8.8)
RBC # BLD AUTO: 4.67 10E12/L (ref 4.4–5.9)
SODIUM SERPL-SCNC: 139 MMOL/L (ref 133–144)
TIBC SERPL-MCNC: 359 UG/DL (ref 240–430)
WBC # BLD AUTO: 8.2 10E9/L (ref 4–11)

## 2017-12-26 PROCEDURE — 85027 COMPLETE CBC AUTOMATED: CPT | Performed by: INTERNAL MEDICINE

## 2017-12-26 PROCEDURE — 83880 ASSAY OF NATRIURETIC PEPTIDE: CPT | Performed by: INTERNAL MEDICINE

## 2017-12-26 PROCEDURE — 99204 OFFICE O/P NEW MOD 45 MIN: CPT | Mod: ZP | Performed by: INTERNAL MEDICINE

## 2017-12-26 PROCEDURE — 36415 COLL VENOUS BLD VENIPUNCTURE: CPT | Performed by: INTERNAL MEDICINE

## 2017-12-26 PROCEDURE — 83550 IRON BINDING TEST: CPT | Performed by: INTERNAL MEDICINE

## 2017-12-26 PROCEDURE — 99212 OFFICE O/P EST SF 10 MIN: CPT | Mod: ZF

## 2017-12-26 PROCEDURE — 83540 ASSAY OF IRON: CPT | Performed by: INTERNAL MEDICINE

## 2017-12-26 PROCEDURE — 80053 COMPREHEN METABOLIC PANEL: CPT | Performed by: INTERNAL MEDICINE

## 2017-12-26 ASSESSMENT — PAIN SCALES - GENERAL: PAINLEVEL: NO PAIN (0)

## 2017-12-26 NOTE — MR AVS SNAPSHOT
After Visit Summary   12/26/2017    Clarke Moreira    MRN: [id number]           Patient Information     Date Of Birth          1950        Visit Information        Provider Department      12/26/2017 9:30 AM Christian Willoughby MD [facility]        Today's Diagnoses     Chronic systolic congestive heart failure (H)          Care Instructions    You were seen today in the Cardiovascular Clinic at the [facility].   Cardiology Providers you saw during your visit:    Dr. Christian Willoughby  Recommendations:   The etiology of the patients heart failure is likely multi-factorial relating to abnormalities of relations, obesity, alveolar hypoventilation.    The patient's weight is currently quite high and probably would benefit from diuresis to below 400 pounds and right heart catherization to define point of lesions.    Discussed in-patient right heart catherization directed therapy vis a vis twice a week clinic visits for laboratories   Will obtain new baseline laboratories to see if home treatment is an option.   There is a high likelihood that he will develop urinary retention that will require further evaluation and possible self cathing under urologoic supervision    Needs therapist and to call Dilia Greene at [facility]    Will call patient after laboratories available    May well need PPV during day until weight /volume adjusted.  Will see sleep on Friday  Follow-up:   We will discuss follow up after we review your labs and make a decision on your diuretics.    For emergencies call 911.     If you have any questions regarding your visit please contact your care team:     Ro Aguilar RN  [facility]  Cardiology Care Coordinator-Heart Failure    Appointment scheduling or nurse questions: [phone]    On Call Cardiologist for after hours or on weekends: [phone]    option #4    If you need a medication refill  "please contact your pharmacy.  Please allow 3 business days for your refill to be completed.    As always, thank you for trusting us with your health care needs!          Follow-ups after your visit        Your next 10 appointments already scheduled     Dec 29, 2017 10:00 AM CST   New Sleep Patient with REAGAN Claros CNP   [facility] [place], Sleep Study ([facility])    [address] [place] [id number] [phone]            Danny 15, 2018  9:20 AM CST   (Arrive by 9:05 AM)   NEW FOOT/ANKLE with Jesús Lagos DPM   [facility] ([facility])    [address]  4th Floor  [facility] [address] [phone]              Future tests that were ordered for you today     Open Standing Orders        Priority Remaining Interval Expires Ordered    Iron and iron binding capacity Routine 1/1 AM DRAW  12/26/2017            Who to contact     If you have questions or need follow up information about today's clinic visit or your schedule please contact [facility] directly at [phone].  Normal or non-critical lab and imaging results will be communicated to you by MyChart, letter or phone within 4 business days after the clinic has received the results. If you do not hear from us within 7 days, please contact the clinic through [employer] or phone. If you have a critical or abnormal lab result, we will notify you by phone as soon as possible.  Submit refill requests through [employer] or call your pharmacy and they will forward the refill request to us. Please allow 3 business days for your refill to be completed.          Additional Information About Your Visit        [employer] Information     [employer] lets you send messages to your doctor, view your test results, renew your prescriptions, schedule appointments and more. To sign up, go to [email or web address] . Click on \"Log in\" on the left side " "of the screen, which will take you to the Welcome page. Then click on \"Sign up Now\" on the right side of the page.     You will be asked to enter the access code listed below, as well as some personal information. Please follow the directions to create your username and password.     Your access code is: [email or web address]  Expires: 2018 12:33 PM     Your access code will  in 90 days. If you need help or a new code, please call your [place] clinic or [phone].        Care EveryWhere ID     This is your Care EveryWhere ID. This could be used by other organizations to access your [place] medical records  [id number]        Your Vitals Were     Pulse Height Pulse Oximetry BMI (Body Mass Index)          102 1.829 m (6') 94% 59.35 kg/m2         Blood Pressure from Last 3 Encounters:   17 145/81   17 113/75   17 124/84    Weight from Last 3 Encounters:   17 (!) 198.5 kg (437 lb 9.6 oz)   17 (!) 191.3 kg (421 lb 12.8 oz)   17 (!) 185.5 kg (409 lb)              We Performed the Following     CBC with platelets differential     Comprehensive metabolic panel        Primary Care Provider Office Phone # Fax #    Matthias Sanchez [phone]        [address] [place] [address]        Equal Access to Services     [facility] : Hadii kell tomo Sodillan, waaxda luqadaha, qaybta kaalmada veronika, vasyl casarez . So [facility] [phone].    ATENCIÓN: Si habla español, tiene a kim disposición servicios gratuitos de asistencia lingüística. Alexis al [phone].    We comply with applicable federal civil rights laws and [place] laws. We do not discriminate on the basis of race, color, national origin, age, disability, sex, sexual orientation, or gender identity.            Thank you!     Thank you for choosing [facility]  for your care. Our goal is always to provide you with excellent care. Hearing back from our " patients is one way we can continue to improve our services. Please take a few minutes to complete the written survey that you may receive in the mail after your visit with us. Thank you!             Your Updated Medication List - Protect others around you: Learn how to safely use, store and throw away your medicines at www.disposemymeds.org.          This list is accurate as of: 12/26/17  9:49 AM.  Always use your most recent med list.                   Brand Name Dispense Instructions for use Diagnosis    aspirin 81 MG tablet     30 tablet    Take 1 tablet (81 mg) by mouth daily    Essential hypertension       atorvastatin 40 MG tablet    LIPITOR    30 tablet    Take 1 tablet (40 mg) by mouth daily    Type 2 diabetes mellitus without complication, without long-term current use of insulin (H)       furosemide 20 MG tablet    LASIX    120 tablet    Take 2 tablets (40 mg) by mouth 2 times daily    Chronic systolic congestive heart failure (H)       ipratropium - albuterol 0.5 mg/2.5 mg/3 mL 0.5-2.5 (3) MG/3ML neb solution    DUONEB    360 mL    Take 1 vial (3 mLs) by nebulization every 4 hours as needed for wheezing    SOB (shortness of breath)       levothyroxine 150 MCG tablet    SYNTHROID/LEVOTHROID    30 tablet    Take 1 tablet (150 mcg) by mouth daily    Hypothyroidism, unspecified type       lisinopril 5 MG tablet    PRINIVIL/ZESTRIL    30 tablet    Take 0.5 tablets (2.5 mg) by mouth daily    Essential hypertension with goal blood pressure less than 140/90       metFORMIN 500 MG tablet    GLUCOPHAGE    60 tablet    Take 1 tablet (500 mg) by mouth 2 times daily (with meals)    Type 2 diabetes mellitus without complication, without long-term current use of insulin (H)       metoprolol 100 MG 24 hr tablet    TOPROL-XL    30 tablet    Take 1 tablet (100 mg) by mouth daily    Essential hypertension with goal blood pressure less than 140/90       multivitamin, therapeutic with minerals Tabs tablet     30 each    Take  1 tablet by mouth daily    Type 2 diabetes mellitus without complication, without long-term current use of insulin (H)       omega 3 1000 MG Caps     30 capsule    Take 1 g by mouth daily    Hyperlipidemia LDL goal <100       order for DME     2 each    Equipment being ordered: Other: Velcro Compression stockings.  Treatment Diagnosis: bilateral lymphedema.    Lymphedema of extremity       polyethylene glycol Packet    MIRALAX/GLYCOLAX    15 packet    Take 17 g by mouth daily as needed for constipation    Constipation, unspecified constipation type       potassium chloride SA 20 MEQ CR tablet    K-DUR/KLOR-CON M    60 tablet    Take 2 tablets (40 mEq) by mouth daily    Hypokalemia       senna-docusate 8.6-50 MG per tablet    SENOKOT-S;PERICOLACE    60 tablet    Take 1-2 tablets by mouth 2 times daily as needed for constipation    Constipation, unspecified constipation type       spironolactone 25 MG tablet    ALDACTONE    60 tablet    Take 1 tablet (25 mg) by mouth 2 times daily    Chronic systolic congestive heart failure (H)       tamsulosin 0.4 MG capsule    FLOMAX    30 capsule    Take 1 capsule (0.4 mg) by mouth daily    Urinary retention       warfarin 5 MG tablet    COUMADIN    75 tablet    Take 2.5 tablets (12.5 mg) by mouth daily    Persistent atrial fibrillation (H)

## 2017-12-26 NOTE — TELEPHONE ENCOUNTER
Returned call to home care nurse to give verbal orders per protocol as requested. Nurse verbalized understanding.    Elif Hartman RN

## 2017-12-26 NOTE — LETTER
12/26/2017      RE: Clarke Moreira  [address]  [facility] [address]       Dear Colleague,    Thank you for the opportunity to participate in the care of your patient, Clarke Moreira, at the [facility]. Please see a copy of my visit note below.    Christian Willoughby M.D.  Cardiovascular Medicine    I personally saw and examined this patient, discussed care with housestaff and other consultants, reviewed current laboratories and imaging studies, and conveyed impression and diagnostic/therapeutic plan to patient.    Problem List  1, Chronic systolic heart failure  2. Morbid obesity  3. Sleep disordered breathing  4. Atrial fibrillation  5. Hyperlipidemia  6. Multi-faceted shortness of breath  7. Anxiety/depression  8. Type II DM  9. Warfarin anticoagulation  10. Allergies: none  11. Lymphedema  12. Hypertension      Referring:     Matthias Sanchez M.D.  Rebekah Reddy - [facility]      History    67 year old male seen for evaluation and treatment regarding congestive heart failure in the context of morbid obesity.     There is interim history of increasing shortness of breath, orthopnea, PND, ankle edema, increasing abdominal girth,but no palpitation, pre-syncope, syncope, bleeding or thromboembolic complications.  The patient is taking medication regularly, following a low salt diet, and exercising regularly as outlined.    Alert, oriented, normal gait and station, normal mental, JVP within normal limits, HJR negative,thyroid not enlarged, mucous membranes clear, abdomen without tenderness, rebound or guarding, skin clear of lesion, PMI normal, S1 variable  S2 regular rhythm, no gallop, no edema    Occupational: middle management    Longstanding depression    Family: orphan    Allergy: none    Surgery: broken bones    Has bariatric bed and chair - need maintenance    REVIEW of SYMPTOMS    Constitutional: without fever,  chills, night sweats.  Weight is up 20 pounds over last several months  HEENT: without dry eyes, dry mouth, sinusitis, corryza, visual changes  Endocrine: without polyuria, polydypsia, polyphagia, heat or cold intolerance, changing mental status. Hemoglobin A!C modestly elevated  Cardiology: without chest pain, tightness, heaviness, pressure, paroxysmal dyspnea, orthopnea, palpitation, pre-syncope or syncope or device discharge (if present)  Pulmonary: without asthma, wheezing, cough, hemoptysis  GI: without nausea, emesis, jaundice, pain, hematemesis, melena  : without frequency, urgency, hematuria, stones, pain, abnormal bleeding, frequency, urgency but history of urinary retention requiring catherization   Neurologic: without TIA, CVA, trauma, seizure  Dermatologic: without lesions, abrasion rash,   Orthopedic/Rheum: without significant joint pain, impairment, limb, polyserositis, ulceration, Raynauds  Heme: without mass, bruising, frequent infection, anemia  Psychiatric: without substance abuse, hallucination, medication, depression    Exercise tolerance: reduced, walks with walker    Objective    Constitutional: alert, oriented,abnormal gait and station, normal mentation.  Oral: moist mucous membranes  Lymph: without pathologic adenopathy  Chest: clear to ausculation and percussion  Cor: No evidence of left or right ventricular activity.  Rhythm is regular.  S1 variablel, S2 split physiologically. Murmurs are not present  Abdomen: without tenderness, rebound, guarding, masses, ascites  Extremities: Edema not present  Neuro: no focal defects, normal mentation  Skin: without open lesions  Psych: oriented, verbal, mental status in tact  Wt Readings from Last 24 Encounters:   12/21/17 (!) 191.3 kg (421 lb 12.8 oz)   12/04/17 (!) 185.5 kg (409 lb)   11/27/17 (!) 198.2 kg (437 lb)   11/10/17 (!) 196 kg (432 lb 3.2 oz)   10/27/17 (!) 181.9 kg (401 lb)   10/07/17 (!) 184 kg (405 lb 10.3 oz)   08/14/17 (!) 191 kg (421  lb)   09/28/16 (!) 187.3 kg (413 lb)   09/14/16 (!) 187.4 kg (413 lb 3.2 oz)   08/01/14 (!) 189.4 kg (417 lb 9.6 oz)   06/07/13 (!) 192 kg (423 lb 4.8 oz)   03/13/13 (!) 191.7 kg (422 lb 9.6 oz)         Meds    Currently taking furosemide 40BID  Current Outpatient Prescriptions   Medication     spironolactone (ALDACTONE) 25 MG tablet     warfarin (COUMADIN) 5 MG tablet     furosemide (LASIX) 20 MG tablet     potassium chloride SA (K-DUR/KLOR-CON M) 20 MEQ CR tablet     senna-docusate (SENOKOT-S;PERICOLACE) 8.6-50 MG per tablet     levothyroxine (SYNTHROID/LEVOTHROID) 150 MCG tablet     lisinopril (PRINIVIL/ZESTRIL) 5 MG tablet     metFORMIN (GLUCOPHAGE) 500 MG tablet     metoprolol (TOPROL-XL) 100 MG 24 hr tablet     multivitamin, therapeutic with minerals (MULTI-VITAMIN) TABS tablet     omega 3 1000 MG CAPS     polyethylene glycol (MIRALAX/GLYCOLAX) Packet     tamsulosin (FLOMAX) 0.4 MG capsule     order for DME     aspirin 81 MG tablet     atorvastatin (LIPITOR) 40 MG tablet     ipratropium - albuterol 0.5 mg/2.5 mg/3 mL (DUONEB) 0.5-2.5 (3) MG/3ML neb solution     No current facility-administered medications for this visit.        Labs  Results for AJIT CALLAHAN (MRN [id number]) as of 12/22/2017 16:27   Ref. Range 11/10/2017 11:12 11/10/2017 16:08 11/27/2017 11:03 11/27/2017 12:09 11/27/2017 12:52 12/4/2017 11:56 12/4/2017 12:43 12/21/2017 10:10   Sodium Latest Ref Range: 132.6 - 141.4 mmol/L 136.7  137.1   138.3  135.6   Potassium Latest Ref Range: 3.3 - 4.5 mmol/dL 3.7  4.1   3.9  3.7   Chloride Latest Ref Range: 98.0 - 110.0 mmol/L 96.6 (L)  99.1   97.3 (L)  100.5   Carbon Dioxide Latest Ref Range: 20.0 - 32.0 mmol/L 26.8  29.7   28.6  30.4   Urea Nitrogen Latest Ref Range: 7.0 - 21.0 mg/dL 3.3 (L)  6.3 (L)   11.8  12.8   Creatinine Latest Ref Range: 0.7 - 1.3 mg/dL 0.7  0.7   0.7  0.7   GFR Estimate Latest Ref Range: >60.0 mL/min/1.7 m2   >90   >90  >90   GFR Estimate If Black Latest Ref Range: >60.0  mL/min/1.7 m2   >90   >90  >90   Calcium Latest Ref Range: 8.5 - 10.1 mg/dL 8.6  8.9   9.1  9.2   Albumin Latest Ref Range: 3.5 - 4.7 mg/dL 3.6          Protein Total Latest Ref Range: 6.8 - 8.8 g/dL 6.7 (L)          Bilirubin Total Latest Ref Range: 0.2 - 1.3 mg/dL 1.0          Alkaline Phosphatase Latest Ref Range: 31.7 - 110.7 U/L 88.3          ALT Latest Ref Range: 0.0 - 45.0 U/L 15.6          AST Latest Ref Range: 0.0 - 55.0 U/L 14.0          Hemoglobin A1C Latest Ref Range: 4.1 - 5.7 %   6.3 (H)        N-Terminal Pro Bnp Latest Ref Range: 0 - 125 pg/mL     (H)   1101 (H) 1738 (H)   T4 Free Latest Ref Range: 0.76 - 1.46 ng/dL  1.60 (H)         TSH Latest Ref Range: 0.40 - 4.00 mU/L  4.89 (H)         Glucose Latest Ref Range: 70.0 - 99.0 mg'dL 120.8 (H)  128.7 (H)   121.2 (H)  132.9 (H)   WBC Latest Ref Range: 4.0 - 11.0 K/uL 7.4          Hemoglobin Latest Ref Range: 13.3 - 17.7 g/dL 14.6          Hematocrit Latest Ref Range: 40.0 - 53.0 % 47.0          Platelets Latest Ref Range: 150.0 - 450.0 K/uL 211.0          RBC Latest Ref Range: 4.40 - 5.90 M/uL 4.85          MCV Latest Ref Range: 78.0 - 100.0 fL 97.0          MCH Latest Ref Range: 26.5 - 35.0 pg 30.1          MCHC Latest Ref Range: 32.0 - 36.0 g/dL 31.1 (L)          % Lymphocytes Latest Ref Range: 20.0 - 48.0 %L 18.1 (L)          % Granulocytes Latest Ref Range: 40.0 - 75.0 %G 74.8          Mid % Latest Ref Range: 0.0 - 20.0 %M 7.1          GRANULOCYTES # Latest Ref Range: 1.6 - 8.3 K/uL 5.5          Lymphocytes # Latest Ref Range: 0.8 - 5.3 K/uL 1.3          Mid # Latest Ref Range: 0.0 - 2.2 K/uL 0.5          INR Unknown 2.7    2.89 (H) >3.5 2.90 (H) 2.4   Hepatitis C Antibody Latest Ref Range: NR^Nonreactive    Nonreactive          Imaging   Name: AJIT CALLAHAN  MRN: [id number]  : 1950  Study Date: 2017 08:49 AM  Age: 67 yrs  Gender: Male  Patient Location: [facility]  Reason For Study: Pulmonary Hypertension  Ordering Physician:  LEWIS BALTAZAR  Performed By: Bernard Snider RDCS     BSA: 3.0 m2  Height: 71 in  Weight: 444 lb  HR: 110  BP: 87/61 mmHg  _____________________________________________________________________________  __        Procedure  Limited Portable Echo Adult. Contrast Lumason. Lumason (NDC #5920-5756-93)  given intravenously. Patient was given 5ml mixture of Lumason. 0 ml wasted.  _____________________________________________________________________________  __        Interpretation Summary  LVEF is probably normal on limited contrasct-enhanced images.  On limited views, RV function is probably mildly reduced.  Pulmonary artery systolic pressure cannot be assessed.  The inferior vena cava is dilated at 3.34 cm without respiratory variability, consistent with increased right atrial pressure. Estimated right atrial pressure is > 15 mmHg.  No pericardial effusion is present.  _____________________________________________________________________________    Exam: XR CHEST PORT 1 VW, 10/4/2017 9:39 AM  Weight around 404     Indication: Follow up CXR on pulmonary edema     Comparison: Chest radiograph 9/26/2017.     Findings:   Single AP view of the chest dated degrees. Right upper extremity PICC  in stable position with tip in the mid SVC. The visualized upper  abdomen is unremarkable. Left-sided rib fractures. Soft tissues are  unremarkable. The trachea is midline. The cardiomediastinal silhouette  is within normal limits. Interval resolution of bilateral pleural  effusions and associated bibasilar atelectasis and consolidation. Mild  interval decrease in pulmonary vascular congestion.         Impression:   1. Interval resolution bilateral pleural effusions and associated  bibasilar atelectasis and/or consolidation.  2. Mild interval decrease in pulmonary vascular congestion.     I have personally reviewed the examination and initial interpretation  and I agree with the findings.    Assessment/Plan     The etiology of the  patients heart failure is likely multi-factorial relating to abnormalities of relations, obesity, alveolar hypoventilation.    The patient's weight is currently quite high and probably would benefit from diuresis to below 400 pounds and right heart catherization to define point of lesions.    Discussed in-patient right heart catherization directed therapy vis a vis twice a week clinic visits for laboratories   Will obtain new baseline laboratories to see if home treatment is an option.   There is a high likelihood that he will develop urinary retention that will require further evaluation and possible self cathing under urologoic supervision    Needs therapist and to call Dilia Greene at [facility]    Will call patient after laboratories available    May well need PPV during day until weight /volume adjusted.  Will see sleep on Friday    Sincerely,     Christian Willoughby MD    CC  Matthias Sanchez  [facility]  Dilia Greene, Ph.D. [facility]

## 2017-12-26 NOTE — NURSING NOTE
Med Reconcile: Reviewed and verified all current medications with the patient. The updated medication list was printed and given to the patient.  Return Appointment: Patient given instructions regarding scheduling next clinic visit. Patient demonstrated understanding of this information and agreed to call with further questions or concerns.  Patient stated he understood all health information given and agreed to call with further questions or concerns.     Recommendations:   The etiology of the patients heart failure is likely multi-factorial relating to abnormalities of relations, obesity, alveolar hypoventilation.    The patient's weight is currently quite high and probably would benefit from diuresis to below 400 pounds and right heart catherization to define point of lesions.    Discussed in-patient right heart catherization directed therapy vis a vis twice a week clinic visits for laboratories   Will obtain new baseline laboratories to see if home treatment is an option.   There is a high likelihood that he will develop urinary retention that will require further evaluation and possible self cathing under urologoic supervision    Needs therapist and to call Dilia Greene at [facility]    Will call patient after laboratories available    May well need PPV during day until weight /volume adjusted.  Will see sleep on Friday  Follow-up:   We will discuss follow up after we review your labs and make a decision on your diuretics.

## 2017-12-26 NOTE — NURSING NOTE
Chief Complaint   Patient presents with     New Patient     consult ? HFpEF, morbid obesity     Vitals were taken and medications were reconciled.   Rosario Butler,CHUY  8:42 AM

## 2017-12-26 NOTE — TELEPHONE ENCOUNTER
[facility] phone call message - order or referral request for patient:     Order or referral being requested: Re-certification for Skilled Nursing visits 1 time a week for 9 weeks with 4 visits as needed with a focus on medication management and reconciliation, pain management, Congestive Heart Failure and associated symptoms, and to treat as ordered. Also to assess and teach skin integrity and falls risk, medication reconciliation, nutrition, hydration, activity and safety.    Additional Comments: Also requesting to continue Occupational Therapy as recommended by Occupational Therapist.    OK to leave a message on voice mail? Yes    Primary language: English      needed? No    Call taken on December 26, 2017 at 3:53 PM by Landen Coronado

## 2017-12-27 ENCOUNTER — MEDICAL CORRESPONDENCE (OUTPATIENT)
Dept: HEALTH INFORMATION MANAGEMENT | Facility: CLINIC | Age: 67
End: 2017-12-27

## 2018-01-04 ENCOUNTER — TELEPHONE (OUTPATIENT)
Dept: FAMILY MEDICINE | Facility: CLINIC | Age: 68
End: 2018-01-04

## 2018-01-04 DIAGNOSIS — L03.119 CELLULITIS OF LOWER EXTREMITY, UNSPECIFIED LATERALITY: Primary | ICD-10-CM

## 2018-01-04 NOTE — TELEPHONE ENCOUNTER
"RN returned call to Stanley romero home care nurse. Ej romero states pt's legs have been looking good. The lymphadema specialist/OT wraps them 3x a week. Yesterday both legs started to get red and now today they are \"jeff red\" per stanley romero.  Denies weeping but L leg looks like it is starting to blister    Message routed to PCP at high priority to review. If appropriate request medication to be sent to [facility] pharmacy on [address]    Elif Hartman RN    "

## 2018-01-05 ENCOUNTER — HOSPITAL ENCOUNTER (INPATIENT)
Facility: CLINIC | Age: 68
LOS: 5 days | Discharge: HOME HEALTH | DRG: 292 | End: 2018-01-10
Attending: EMERGENCY MEDICINE | Admitting: FAMILY MEDICINE
Payer: COMMERCIAL

## 2018-01-05 ENCOUNTER — APPOINTMENT (OUTPATIENT)
Dept: GENERAL RADIOLOGY | Facility: CLINIC | Age: 68
DRG: 292 | End: 2018-01-05
Attending: EMERGENCY MEDICINE
Payer: COMMERCIAL

## 2018-01-05 DIAGNOSIS — I48.20 CHRONIC ATRIAL FIBRILLATION (H): ICD-10-CM

## 2018-01-05 DIAGNOSIS — I50.22 CHRONIC SYSTOLIC CONGESTIVE HEART FAILURE (H): ICD-10-CM

## 2018-01-05 DIAGNOSIS — I87.2 STASIS DERMATITIS OF BOTH LEGS: ICD-10-CM

## 2018-01-05 DIAGNOSIS — I50.9 CHRONIC CONGESTIVE HEART FAILURE, UNSPECIFIED CONGESTIVE HEART FAILURE TYPE: ICD-10-CM

## 2018-01-05 DIAGNOSIS — L03.114 CELLULITIS OF LEFT UPPER EXTREMITY: ICD-10-CM

## 2018-01-05 DIAGNOSIS — E87.70 HYPERVOLEMIA, UNSPECIFIED HYPERVOLEMIA TYPE: ICD-10-CM

## 2018-01-05 DIAGNOSIS — E03.9 HYPOTHYROIDISM, UNSPECIFIED TYPE: Primary | ICD-10-CM

## 2018-01-05 DIAGNOSIS — I50.31 ACUTE DIASTOLIC CONGESTIVE HEART FAILURE (H): ICD-10-CM

## 2018-01-05 DIAGNOSIS — I10 ESSENTIAL HYPERTENSION WITH GOAL BLOOD PRESSURE LESS THAN 140/90: ICD-10-CM

## 2018-01-05 DIAGNOSIS — L03.113 CELLULITIS OF RIGHT UPPER EXTREMITY: ICD-10-CM

## 2018-01-05 PROBLEM — L03.90 CELLULITIS: Status: ACTIVE | Noted: 2018-01-05

## 2018-01-05 LAB
ALBUMIN SERPL-MCNC: 3.1 G/DL (ref 3.4–5)
ALP SERPL-CCNC: 87 U/L (ref 40–150)
ALT SERPL W P-5'-P-CCNC: 16 U/L (ref 0–70)
ANION GAP SERPL CALCULATED.3IONS-SCNC: 6 MMOL/L (ref 3–14)
ANION GAP SERPL CALCULATED.3IONS-SCNC: 8 MMOL/L (ref 3–14)
AST SERPL W P-5'-P-CCNC: 16 U/L (ref 0–45)
BASOPHILS # BLD AUTO: 0 10E9/L (ref 0–0.2)
BASOPHILS NFR BLD AUTO: 0.6 %
BILIRUB SERPL-MCNC: 1.1 MG/DL (ref 0.2–1.3)
BUN SERPL-MCNC: 10 MG/DL (ref 7–30)
BUN SERPL-MCNC: 9 MG/DL (ref 7–30)
CALCIUM SERPL-MCNC: 9.2 MG/DL (ref 8.5–10.1)
CALCIUM SERPL-MCNC: 9.4 MG/DL (ref 8.5–10.1)
CHLORIDE SERPL-SCNC: 101 MMOL/L (ref 94–109)
CHLORIDE SERPL-SCNC: 102 MMOL/L (ref 94–109)
CO2 SERPL-SCNC: 30 MMOL/L (ref 20–32)
CO2 SERPL-SCNC: 32 MMOL/L (ref 20–32)
CREAT SERPL-MCNC: 0.78 MG/DL (ref 0.66–1.25)
CREAT SERPL-MCNC: 0.84 MG/DL (ref 0.66–1.25)
CRP SERPL-MCNC: 6.2 MG/L (ref 0–8)
DIFFERENTIAL METHOD BLD: ABNORMAL
EOSINOPHIL # BLD AUTO: 0.2 10E9/L (ref 0–0.7)
EOSINOPHIL NFR BLD AUTO: 3.6 %
ERYTHROCYTE [DISTWIDTH] IN BLOOD BY AUTOMATED COUNT: 15.8 % (ref 10–15)
GFR SERPL CREATININE-BSD FRML MDRD: >90 ML/MIN/1.7M2
GFR SERPL CREATININE-BSD FRML MDRD: >90 ML/MIN/1.7M2
GLUCOSE BLDC GLUCOMTR-MCNC: 102 MG/DL (ref 70–99)
GLUCOSE BLDC GLUCOMTR-MCNC: 107 MG/DL (ref 70–99)
GLUCOSE SERPL-MCNC: 103 MG/DL (ref 70–99)
GLUCOSE SERPL-MCNC: 115 MG/DL (ref 70–99)
HCT VFR BLD AUTO: 42.5 % (ref 40–53)
HGB BLD-MCNC: 13.4 G/DL (ref 13.3–17.7)
IMM GRANULOCYTES # BLD: 0 10E9/L (ref 0–0.4)
IMM GRANULOCYTES NFR BLD: 0.2 %
INR PPP: 2.01 (ref 0.86–1.14)
INTERPRETATION ECG - MUSE: NORMAL
LACTATE BLD-SCNC: 1.1 MMOL/L (ref 0.7–2)
LYMPHOCYTES # BLD AUTO: 1.8 10E9/L (ref 0.8–5.3)
LYMPHOCYTES NFR BLD AUTO: 28 %
MAGNESIUM SERPL-MCNC: 1.9 MG/DL (ref 1.6–2.3)
MAGNESIUM SERPL-MCNC: 2 MG/DL (ref 1.6–2.3)
MCH RBC QN AUTO: 30.1 PG (ref 26.5–33)
MCHC RBC AUTO-ENTMCNC: 31.5 G/DL (ref 31.5–36.5)
MCV RBC AUTO: 96 FL (ref 78–100)
MONOCYTES # BLD AUTO: 0.7 10E9/L (ref 0–1.3)
MONOCYTES NFR BLD AUTO: 10.2 %
NEUTROPHILS # BLD AUTO: 3.7 10E9/L (ref 1.6–8.3)
NEUTROPHILS NFR BLD AUTO: 57.4 %
NRBC # BLD AUTO: 0 10*3/UL
NRBC BLD AUTO-RTO: 0 /100
PHOSPHATE SERPL-MCNC: 3.7 MG/DL (ref 2.5–4.5)
PLATELET # BLD AUTO: 182 10E9/L (ref 150–450)
POTASSIUM SERPL-SCNC: 3.3 MMOL/L (ref 3.4–5.3)
POTASSIUM SERPL-SCNC: 3.7 MMOL/L (ref 3.4–5.3)
PROT SERPL-MCNC: 7.6 G/DL (ref 6.8–8.8)
RBC # BLD AUTO: 4.45 10E12/L (ref 4.4–5.9)
SODIUM SERPL-SCNC: 140 MMOL/L (ref 133–144)
SODIUM SERPL-SCNC: 140 MMOL/L (ref 133–144)
WBC # BLD AUTO: 6.5 10E9/L (ref 4–11)

## 2018-01-05 PROCEDURE — 99285 EMERGENCY DEPT VISIT HI MDM: CPT | Mod: 25 | Performed by: EMERGENCY MEDICINE

## 2018-01-05 PROCEDURE — 99232 SBSQ HOSP IP/OBS MODERATE 35: CPT | Performed by: INTERNAL MEDICINE

## 2018-01-05 PROCEDURE — 40000275 ZZH STATISTIC RCP TIME EA 10 MIN

## 2018-01-05 PROCEDURE — 71045 X-RAY EXAM CHEST 1 VIEW: CPT

## 2018-01-05 PROCEDURE — 85025 COMPLETE CBC W/AUTO DIFF WBC: CPT | Performed by: EMERGENCY MEDICINE

## 2018-01-05 PROCEDURE — 86140 C-REACTIVE PROTEIN: CPT | Performed by: EMERGENCY MEDICINE

## 2018-01-05 PROCEDURE — 80053 COMPREHEN METABOLIC PANEL: CPT | Performed by: EMERGENCY MEDICINE

## 2018-01-05 PROCEDURE — 00000146 ZZHCL STATISTIC GLUCOSE BY METER IP

## 2018-01-05 PROCEDURE — 93010 ELECTROCARDIOGRAM REPORT: CPT | Mod: Z6 | Performed by: EMERGENCY MEDICINE

## 2018-01-05 PROCEDURE — 94660 CPAP INITIATION&MGMT: CPT

## 2018-01-05 PROCEDURE — 25000128 H RX IP 250 OP 636: Performed by: EMERGENCY MEDICINE

## 2018-01-05 PROCEDURE — 25000132 ZZH RX MED GY IP 250 OP 250 PS 637: Performed by: EMERGENCY MEDICINE

## 2018-01-05 PROCEDURE — 84100 ASSAY OF PHOSPHORUS: CPT | Performed by: FAMILY MEDICINE

## 2018-01-05 PROCEDURE — 96374 THER/PROPH/DIAG INJ IV PUSH: CPT | Performed by: EMERGENCY MEDICINE

## 2018-01-05 PROCEDURE — 83605 ASSAY OF LACTIC ACID: CPT | Performed by: EMERGENCY MEDICINE

## 2018-01-05 PROCEDURE — 80048 BASIC METABOLIC PNL TOTAL CA: CPT | Performed by: FAMILY MEDICINE

## 2018-01-05 PROCEDURE — 36415 COLL VENOUS BLD VENIPUNCTURE: CPT | Performed by: FAMILY MEDICINE

## 2018-01-05 PROCEDURE — 87040 BLOOD CULTURE FOR BACTERIA: CPT | Performed by: FAMILY MEDICINE

## 2018-01-05 PROCEDURE — 83735 ASSAY OF MAGNESIUM: CPT | Performed by: EMERGENCY MEDICINE

## 2018-01-05 PROCEDURE — 12000001 ZZH R&B MED SURG/OB UMMC

## 2018-01-05 PROCEDURE — 85610 PROTHROMBIN TIME: CPT | Performed by: EMERGENCY MEDICINE

## 2018-01-05 PROCEDURE — 25000128 H RX IP 250 OP 636: Performed by: FAMILY MEDICINE

## 2018-01-05 PROCEDURE — 83735 ASSAY OF MAGNESIUM: CPT | Performed by: FAMILY MEDICINE

## 2018-01-05 PROCEDURE — 25000132 ZZH RX MED GY IP 250 OP 250 PS 637: Performed by: FAMILY MEDICINE

## 2018-01-05 PROCEDURE — 93005 ELECTROCARDIOGRAM TRACING: CPT | Performed by: EMERGENCY MEDICINE

## 2018-01-05 RX ORDER — ACETAMINOPHEN 325 MG/1
650 TABLET ORAL EVERY 4 HOURS PRN
Status: DISCONTINUED | OUTPATIENT
Start: 2018-01-05 | End: 2018-01-10 | Stop reason: HOSPADM

## 2018-01-05 RX ORDER — ASPIRIN 81 MG/1
81 TABLET ORAL DAILY
Status: DISCONTINUED | OUTPATIENT
Start: 2018-01-06 | End: 2018-01-10 | Stop reason: HOSPADM

## 2018-01-05 RX ORDER — FUROSEMIDE 10 MG/ML
60 INJECTION INTRAMUSCULAR; INTRAVENOUS ONCE
Status: DISCONTINUED | OUTPATIENT
Start: 2018-01-05 | End: 2018-01-05

## 2018-01-05 RX ORDER — LIDOCAINE 40 MG/G
CREAM TOPICAL
Status: DISCONTINUED | OUTPATIENT
Start: 2018-01-05 | End: 2018-01-10 | Stop reason: HOSPADM

## 2018-01-05 RX ORDER — POTASSIUM CHLORIDE 29.8 MG/ML
20 INJECTION INTRAVENOUS
Status: DISCONTINUED | OUTPATIENT
Start: 2018-01-05 | End: 2018-01-10 | Stop reason: HOSPADM

## 2018-01-05 RX ORDER — SPIRONOLACTONE 25 MG/1
25 TABLET ORAL 2 TIMES DAILY
Status: DISCONTINUED | OUTPATIENT
Start: 2018-01-05 | End: 2018-01-10 | Stop reason: HOSPADM

## 2018-01-05 RX ORDER — MULTIPLE VITAMINS W/ MINERALS TAB 9MG-400MCG
1 TAB ORAL DAILY
Status: DISCONTINUED | OUTPATIENT
Start: 2018-01-06 | End: 2018-01-10 | Stop reason: HOSPADM

## 2018-01-05 RX ORDER — ONDANSETRON 2 MG/ML
4 INJECTION INTRAMUSCULAR; INTRAVENOUS EVERY 6 HOURS PRN
Status: DISCONTINUED | OUTPATIENT
Start: 2018-01-05 | End: 2018-01-10 | Stop reason: HOSPADM

## 2018-01-05 RX ORDER — LACTOBACILLUS RHAMNOSUS GG 10B CELL
1 CAPSULE ORAL 2 TIMES DAILY
Status: DISCONTINUED | OUTPATIENT
Start: 2018-01-05 | End: 2018-01-10 | Stop reason: HOSPADM

## 2018-01-05 RX ORDER — PROCHLORPERAZINE 25 MG
12.5 SUPPOSITORY, RECTAL RECTAL EVERY 12 HOURS PRN
Status: DISCONTINUED | OUTPATIENT
Start: 2018-01-05 | End: 2018-01-10 | Stop reason: HOSPADM

## 2018-01-05 RX ORDER — ONDANSETRON 4 MG/1
4 TABLET, ORALLY DISINTEGRATING ORAL EVERY 6 HOURS PRN
Status: DISCONTINUED | OUTPATIENT
Start: 2018-01-05 | End: 2018-01-10 | Stop reason: HOSPADM

## 2018-01-05 RX ORDER — CHLORAL HYDRATE 500 MG
1 CAPSULE ORAL DAILY
Status: DISCONTINUED | OUTPATIENT
Start: 2018-01-05 | End: 2018-01-10 | Stop reason: HOSPADM

## 2018-01-05 RX ORDER — NICOTINE POLACRILEX 4 MG
15-30 LOZENGE BUCCAL
Status: DISCONTINUED | OUTPATIENT
Start: 2018-01-05 | End: 2018-01-10 | Stop reason: HOSPADM

## 2018-01-05 RX ORDER — POTASSIUM CL/LIDO/0.9 % NACL 10MEQ/0.1L
10 INTRAVENOUS SOLUTION, PIGGYBACK (ML) INTRAVENOUS
Status: DISCONTINUED | OUTPATIENT
Start: 2018-01-05 | End: 2018-01-10 | Stop reason: HOSPADM

## 2018-01-05 RX ORDER — FUROSEMIDE 10 MG/ML
40 INJECTION INTRAMUSCULAR; INTRAVENOUS
Status: DISCONTINUED | OUTPATIENT
Start: 2018-01-05 | End: 2018-01-06 | Stop reason: DRUGHIGH

## 2018-01-05 RX ORDER — FUROSEMIDE 10 MG/ML
40 INJECTION INTRAMUSCULAR; INTRAVENOUS ONCE
Status: COMPLETED | OUTPATIENT
Start: 2018-01-05 | End: 2018-01-05

## 2018-01-05 RX ORDER — LEVOTHYROXINE SODIUM 75 UG/1
150 TABLET ORAL DAILY
Status: DISCONTINUED | OUTPATIENT
Start: 2018-01-05 | End: 2018-01-06

## 2018-01-05 RX ORDER — ATORVASTATIN CALCIUM 40 MG/1
40 TABLET, FILM COATED ORAL DAILY
Status: DISCONTINUED | OUTPATIENT
Start: 2018-01-05 | End: 2018-01-10 | Stop reason: HOSPADM

## 2018-01-05 RX ORDER — SULFAMETHOXAZOLE/TRIMETHOPRIM 800-160 MG
1 TABLET ORAL 2 TIMES DAILY
Status: DISCONTINUED | OUTPATIENT
Start: 2018-01-05 | End: 2018-01-10 | Stop reason: HOSPADM

## 2018-01-05 RX ORDER — METOPROLOL SUCCINATE 50 MG/1
100 TABLET, EXTENDED RELEASE ORAL DAILY
Status: DISCONTINUED | OUTPATIENT
Start: 2018-01-05 | End: 2018-01-10 | Stop reason: HOSPADM

## 2018-01-05 RX ORDER — AMOXICILLIN 250 MG
2 CAPSULE ORAL 2 TIMES DAILY PRN
Status: DISCONTINUED | OUTPATIENT
Start: 2018-01-05 | End: 2018-01-10 | Stop reason: HOSPADM

## 2018-01-05 RX ORDER — TAMSULOSIN HYDROCHLORIDE 0.4 MG/1
0.4 CAPSULE ORAL DAILY
Status: DISCONTINUED | OUTPATIENT
Start: 2018-01-05 | End: 2018-01-10 | Stop reason: HOSPADM

## 2018-01-05 RX ORDER — POTASSIUM CHLORIDE 750 MG/1
20-40 TABLET, EXTENDED RELEASE ORAL
Status: DISCONTINUED | OUTPATIENT
Start: 2018-01-05 | End: 2018-01-10 | Stop reason: HOSPADM

## 2018-01-05 RX ORDER — PROCHLORPERAZINE MALEATE 5 MG
5 TABLET ORAL EVERY 6 HOURS PRN
Status: DISCONTINUED | OUTPATIENT
Start: 2018-01-05 | End: 2018-01-10 | Stop reason: HOSPADM

## 2018-01-05 RX ORDER — NALOXONE HYDROCHLORIDE 0.4 MG/ML
.1-.4 INJECTION, SOLUTION INTRAMUSCULAR; INTRAVENOUS; SUBCUTANEOUS
Status: DISCONTINUED | OUTPATIENT
Start: 2018-01-05 | End: 2018-01-10 | Stop reason: HOSPADM

## 2018-01-05 RX ORDER — POTASSIUM CHLORIDE 1.5 G/1.58G
20-40 POWDER, FOR SOLUTION ORAL
Status: DISCONTINUED | OUTPATIENT
Start: 2018-01-05 | End: 2018-01-10 | Stop reason: HOSPADM

## 2018-01-05 RX ORDER — IBUPROFEN 200 MG
600 TABLET ORAL EVERY 6 HOURS PRN
Status: DISCONTINUED | OUTPATIENT
Start: 2018-01-05 | End: 2018-01-10

## 2018-01-05 RX ORDER — POTASSIUM CHLORIDE 7.45 MG/ML
10 INJECTION INTRAVENOUS
Status: DISCONTINUED | OUTPATIENT
Start: 2018-01-05 | End: 2018-01-10 | Stop reason: HOSPADM

## 2018-01-05 RX ORDER — IPRATROPIUM BROMIDE AND ALBUTEROL SULFATE 2.5; .5 MG/3ML; MG/3ML
3 SOLUTION RESPIRATORY (INHALATION) EVERY 4 HOURS PRN
Status: DISCONTINUED | OUTPATIENT
Start: 2018-01-05 | End: 2018-01-10 | Stop reason: HOSPADM

## 2018-01-05 RX ORDER — CEPHALEXIN 500 MG/1
500 CAPSULE ORAL 3 TIMES DAILY
Qty: 42 CAPSULE | Refills: 0 | Status: ON HOLD | OUTPATIENT
Start: 2018-01-05 | End: 2018-01-08

## 2018-01-05 RX ORDER — DEXTROSE MONOHYDRATE 25 G/50ML
25-50 INJECTION, SOLUTION INTRAVENOUS
Status: DISCONTINUED | OUTPATIENT
Start: 2018-01-05 | End: 2018-01-10 | Stop reason: HOSPADM

## 2018-01-05 RX ORDER — LISINOPRIL 2.5 MG/1
2.5 TABLET ORAL DAILY
Status: DISCONTINUED | OUTPATIENT
Start: 2018-01-06 | End: 2018-01-06

## 2018-01-05 RX ORDER — POLYETHYLENE GLYCOL 3350 17 G/17G
17 POWDER, FOR SOLUTION ORAL DAILY PRN
Status: DISCONTINUED | OUTPATIENT
Start: 2018-01-05 | End: 2018-01-10 | Stop reason: HOSPADM

## 2018-01-05 RX ORDER — AMOXICILLIN 250 MG
1 CAPSULE ORAL 2 TIMES DAILY PRN
Status: DISCONTINUED | OUTPATIENT
Start: 2018-01-05 | End: 2018-01-10 | Stop reason: HOSPADM

## 2018-01-05 RX ORDER — POTASSIUM CHLORIDE 1.5 G/1.58G
40 POWDER, FOR SOLUTION ORAL ONCE
Status: COMPLETED | OUTPATIENT
Start: 2018-01-05 | End: 2018-01-05

## 2018-01-05 RX ADMIN — ATORVASTATIN CALCIUM 40 MG: 40 TABLET, FILM COATED ORAL at 17:40

## 2018-01-05 RX ADMIN — SULFAMETHOXAZOLE AND TRIMETHOPRIM 1 TABLET: 800; 160 TABLET ORAL at 22:01

## 2018-01-05 RX ADMIN — POTASSIUM CHLORIDE 40 MEQ: 1.5 POWDER, FOR SOLUTION ORAL at 14:22

## 2018-01-05 RX ADMIN — Medication 1 G: at 17:40

## 2018-01-05 RX ADMIN — TAMSULOSIN HYDROCHLORIDE 0.4 MG: 0.4 CAPSULE ORAL at 22:01

## 2018-01-05 RX ADMIN — FUROSEMIDE 40 MG: 10 INJECTION, SOLUTION INTRAVENOUS at 17:39

## 2018-01-05 RX ADMIN — FUROSEMIDE 40 MG: 10 INJECTION, SOLUTION INTRAVENOUS at 14:23

## 2018-01-05 RX ADMIN — SPIRONOLACTONE 25 MG: 25 TABLET ORAL at 22:01

## 2018-01-05 RX ADMIN — Medication 1 CAPSULE: at 22:01

## 2018-01-05 RX ADMIN — METOPROLOL SUCCINATE 100 MG: 50 TABLET, EXTENDED RELEASE ORAL at 22:00

## 2018-01-05 ASSESSMENT — ENCOUNTER SYMPTOMS
CHILLS: 0
WEIGHT LOSS: 0
PND: 1
CONSTIPATION: 1
PALPITATIONS: 0
DEPRESSION: 0
ORTHOPNEA: 1
FLANK PAIN: 0
VOMITING: 0
ARTHRALGIAS: 0
WEAKNESS: 0
BACK PAIN: 0
HEADACHES: 1
EYE REDNESS: 0
COLOR CHANGE: 1
CONFUSION: 0
BLOOD IN STOOL: 0
CLAUDICATION: 1
HEADACHES: 0
FREQUENCY: 0
SHORTNESS OF BREATH: 1
NERVOUS/ANXIOUS: 0
FEVER: 0
DIZZINESS: 0
NUMBNESS: 1
SPUTUM PRODUCTION: 0
SHORTNESS OF BREATH: 0
ABDOMINAL PAIN: 0
COUGH: 0
WOUND: 1
HEMOPTYSIS: 0
BLURRED VISION: 0
NAUSEA: 0
NECK STIFFNESS: 0
DYSURIA: 0
DIFFICULTY URINATING: 0
DIAPHORESIS: 0
WHEEZING: 0
DIARRHEA: 0

## 2018-01-05 NOTE — PHARMACY-ANTICOAGULATION SERVICE
Clinical Pharmacy - Warfarin Dosing Consult     Pharmacy has been consulted to manage this patient s warfarin therapy.  Indication: Atrial Fibrillation  Therapy Goal: INR 2-3  Warfarin Prior to Admission: Yes  Warfarin PTA Regimen: 12.5 mg daily  Significant drug interactions: Bactrim, levothyroxine, atorvastatin, aspirin, ibuprofen  Recent documented change in oral intake/nutrition: Unknown  Dose Comments: Dose taken PTA on 1/5    INR   Date Value Ref Range Status   01/05/2018 2.01 (H) 0.86 - 1.14 Final   12/21/2017 2.4  Final     Comment:     Adult Normal Range: 0.90 - 1.10  Therapeutic Range: 2.0 - 3.5         Recommend no warfarin today, given that patient took his dose prior to admission today and INR is therapeutic.  Pharmacy will monitor Clarke RENÉE Harish daily and order warfarin doses to achieve specified goal.      Please contact pharmacy as soon as possible if the warfarin needs to be held for a procedure or if the warfarin goals change.

## 2018-01-05 NOTE — CONSULTS
1, Chronic systolic heart failure  2. Morbid obesity  3. Sleep disordered breathing  4. Atrial fibrillation/anticoagulation  5. Hyperlipidemia  6. Multi-faceted shortness of breath  7. Anxiety/depression  8. Type II DM  9. Warfarin anticoagulation  10. Allergies: none  11. Lymphedema  12. Hypertension  13. Lower extremity cellulits    Suggest:  1. Admit to medicine for diuresis and care of cellulitis  2. Patient has sleep study scheduled for January 18  3. Either intermittent bolus IV furosemide and furosemide gtt.  4. Replace potassium and check magnesium  5. Weight target 401 or below  6. Will need right heart catheriztion to sort out the various components contributing to his fluid retention (? PH, diastolic relaxation abnormalities, alveolar hypoventilation          Referring:    Matthias Sanchez M.D.  Rebekah Reddy - [facility]    67 year old male seen at request of ER where he present with increasing symptoms of CHF (orthopnea, weight gain, increasing edema) related to not having medications. There is no interim history of chest pain, palpitation, pre-syncope or syncope.    Examination demonstrated increased body mass, increasing JVP, variable S1, S2, increased abdominal girth, marked dependent edema with evidence of cellulitis    Wt Readings from Last 24 Encounters:   01/05/18 (!) 198.5 kg (437 lb 9.8 oz)   12/26/17 (!) 198.5 kg (437 lb 9.6 oz)   12/21/17 (!) 191.3 kg (421 lb 12.8 oz)   12/04/17 (!) 185.5 kg (409 lb)   11/27/17 (!) 198.2 kg (437 lb)   11/10/17 (!) 196 kg (432 lb 3.2 oz)   10/27/17 (!) 181.9 kg (401 lb)   10/07/17 (!) 184 kg (405 lb 10.3 oz)   08/14/17 (!) 191 kg (421 lb)   09/28/16 (!) 187.3 kg (413 lb)   09/14/16 (!) 187.4 kg (413 lb 3.2 oz)   08/01/14 (!) 189.4 kg (417 lb 9.6 oz)   06/07/13 (!) 192 kg (423 lb 4.8 oz)   03/13/13 (!) 191.7 kg (422 lb 9.6 oz)       spironolactone (ALDACTONE) 25 MG tablet     warfarin (COUMADIN) 5 MG tablet     furosemide (LASIX) 20 MG tablet      potassium chloride SA (K-DUR/KLOR-CON M) 20 MEQ CR tablet     senna-docusate (SENOKOT-S;PERICOLACE) 8.6-50 MG per tablet     levothyroxine (SYNTHROID/LEVOTHROID) 150 MCG tablet     lisinopril (PRINIVIL/ZESTRIL) 5 MG tablet     metFORMIN (GLUCOPHAGE) 500 MG tablet     metoprolol (TOPROL-XL) 100 MG 24 hr tablet     multivitamin, therapeutic with minerals (MULTI-VITAMIN) TABS tablet     omega 3 1000 MG CAPS     polyethylene glycol (MIRALAX/GLYCOLAX) Packet     tamsulosin (FLOMAX) 0.4 MG capsule     order for DME     aspirin 81 MG tablet     atorvastatin (LIPITOR) 40 MG tablet     ipratropium - albuterol 0.5 mg/2.5 mg/3 mL (DUONEB) 0.5-2.5 (3) MG/3ML neb solution     Results for AJIT CALLAHAN (MRN [id number]) as of 1/5/2018 15:52   Ref. Range 12/21/2017 10:10 12/26/2017 10:12 1/5/2018 11:09 1/5/2018 14:00   Sodium Latest Ref Range: 133 - 144 mmol/L 135.6 139 140    Potassium Latest Ref Range: 3.4 - 5.3 mmol/L 3.7 4.0 3.3 (L)    Chloride Latest Ref Range: 94 - 109 mmol/L 100.5 105 101    Carbon Dioxide Latest Ref Range: 20 - 32 mmol/L 30.4 25 32    Urea Nitrogen Latest Ref Range: 7 - 30 mg/dL 12.8 12 10    Creatinine Latest Ref Range: 0.66 - 1.25 mg/dL 0.7 0.68 0.84    GFR Estimate Latest Ref Range: >60 mL/min/1.7m2 >90 >90 >90    GFR Estimate If Black Latest Ref Range: >60 mL/min/1.7m2 >90 >90 >90    Calcium Latest Ref Range: 8.5 - 10.1 mg/dL 9.2 8.8 9.4    Anion Gap Latest Ref Range: 3 - 14 mmol/L  9 6    Magnesium Latest Ref Range: 1.6 - 2.3 mg/dL   2.0    Albumin Latest Ref Range: 3.4 - 5.0 g/dL  3.3 (L) 3.1 (L)    Protein Total Latest Ref Range: 6.8 - 8.8 g/dL  8.0 7.6    Bilirubin Total Latest Ref Range: 0.2 - 1.3 mg/dL  1.0 1.1    Alkaline Phosphatase Latest Ref Range: 40 - 150 U/L  97 87    ALT Latest Ref Range: 0 - 70 U/L  15 16    AST Latest Ref Range: 0 - 45 U/L  22 16    CRP Inflammation Latest Ref Range: 0.0 - 8.0 mg/L   6.2    Iron Latest Ref Range: 35 - 180 ug/dL  60     Iron Binding Cap Latest Ref  Range: 240 - 430 ug/dL  359     Iron Saturation Index Latest Ref Range: 15 - 46 %  17     Lactic Acid Latest Ref Range: 0.7 - 2.0 mmol/L   1.1    N-Terminal Pro Bnp Latest Ref Range: 0 - 125 pg/mL 1738 (H) 1590 (H)     Glucose Latest Ref Range: 70 - 99 mg/dL 132.9 (H) 126 (H) 103 (H)    WBC Latest Ref Range: 4.0 - 11.0 10e9/L  8.2 6.5    Hemoglobin Latest Ref Range: 13.3 - 17.7 g/dL  13.4 13.4    Hematocrit Latest Ref Range: 40.0 - 53.0 %  44.5 42.5    Platelet Count Latest Ref Range: 150 - 450 10e9/L  188 182    RBC Count Latest Ref Range: 4.4 - 5.9 10e12/L  4.67 4.45    MCV Latest Ref Range: 78 - 100 fl  95 96    MCH Latest Ref Range: 26.5 - 33.0 pg  28.7 30.1    MCHC Latest Ref Range: 31.5 - 36.5 g/dL  30.1 (L) 31.5    RDW Latest Ref Range: 10.0 - 15.0 %  15.5 (H) 15.8 (H)    Diff Method Unknown   Automated Method    % Neutrophils Latest Units: %   57.4    % Lymphocytes Latest Units: %   28.0    % Monocytes Latest Units: %   10.2    % Eosinophils Latest Units: %   3.6    % Basophils Latest Units: %   0.6    % Immature Granulocytes Latest Units: %   0.2    Nucleated RBCs Latest Ref Range: 0 /100   0    Absolute Neutrophil Latest Ref Range: 1.6 - 8.3 10e9/L   3.7    Absolute Lymphocytes Latest Ref Range: 0.8 - 5.3 10e9/L   1.8    Absolute Monocytes Latest Ref Range: 0.0 - 1.3 10e9/L   0.7    Absolute Eosinophils Latest Ref Range: 0.0 - 0.7 10e9/L   0.2    Absolute Basophils Latest Ref Range: 0.0 - 0.2 10e9/L   0.0    Abs Immature Granulocytes Latest Ref Range: 0 - 0.4 10e9/L   0.0    Absolute Nucleated RBC Unknown   0.0    INR Latest Ref Range: 0.86 - 1.14  2.4  2.01 (H)    EKG 12-LEAD, TRACING ONLY Unknown    Rpt     Name: CALLAHAN, AJIT D  MRN: [id number]  : 1950  Study Date: 2017 08:49 AM  Age: 67 yrs  Gender: Male  [place] [facility] [place] For Study: Pulmonary Hypertension  Ordering Physician: LEWIS BALTAZAR  Performed By: Bernard Snider RDCS     BSA: 3.0 m2  Height: 71 in  Weight: 444  lb  HR: 110  BP: 87/61 mmHg  _____________________________________________________________________________  __        Procedure  Limited Portable Echo Adult. Contrast Lumason. Lumason (NDC #6250-6093-79)  given intravenously. Patient was given 5ml mixture of Lumason. 0 ml wasted.  _____________________________________________________________________________  __        Interpretation Summary  LVEF is probably normal on limited contrasct-enhanced images.  On limited views, RV function is probably mildly reduced.  Pulmonary artery systolic pressure cannot be assessed.  The inferior vena cava is dilated at 3.34 cm without respiratory variability,  consistent with increased right atrial pressure. Estimated right atrial  pressure is > 15 mmHg.  No pericardial effusion is present.

## 2018-01-05 NOTE — TELEPHONE ENCOUNTER
RN returned call to home care. At time of call, ambulance was at the patient's home to bring him the [facility] ED. Yesterday patient's legs appeared to be cellulitis but today more of a circulation issue. Nevin Blount also reported expiratory wheezing. States she just found out patient had not been taking their Lasix for 10 days but started again on Wednesday.     Message routed to PCP to be aware.    Carla Gonzales RN

## 2018-01-05 NOTE — PROGRESS NOTES
[facility]  Patient is currently open to home care services with [facility].  The patient is currently receiving RN/OT services.  formerly [facility]  and team have been notified of patient admission.  formerly [facility] liaison will continue to follow patient during stay.  If appropriate provide orders to resume home care at time of discharge.    Thank you  Belkis Solano RN, BSN  [facility] Liaison  [phone]

## 2018-01-05 NOTE — H&P
[facility]  Inpatient History and Physical    Clarke Moreira MRN# [id number]   Age: 67 year old   YOB: 1950   Date of admission: 1/5/2018  Date of service: January 5, 2018    Primary care provider: Matthias Sanchez        Chief Concern:   Cellulitis         History is obtained from the patient and electronic medical records          History of Present Illness (Resident / Clinician):     This is a 67 year old male with a past medical history significant for HFrEF, chronic venous stasis, atrial fibrillation, DM-Type-2, PVD, morbid obesity, and depression who presented to the hospital for concerns of lower extremities cellulitis.     Patient has a history of chronic stasis edema with skin changes. He reported that he was told to come into the hospital by his Nurse Manager due to concerns of cellulitis. He was seen by his Lymphedema nurse a few days ago and was noted to have worsening lower extremities erythema with bullae formation. He denies any fever, chills, nausea, and vomiting.      Clarke has a history of systolic heart failure, obesity and persistent fluid overload despite diuresis. He reports or worsening shortness of breath, orthopnea, paroxysmal nocturnal dyspnea, abdominal distention and worsening lower extremities edema. He was recently seen by his Cardiologist who recommended right heart catheterization to evaluate for various components resulting to his worsening volume overload.     During my encounter with patient in the ED he was hemodynamically stable and on room air. Infectious and inflammatory work-up ordered by the ED provider were all unremarkable. Chest xray was consistent with pulmonary congestion. He was transferred to the inpatient floor for further management of cellulitis and volume overload.            Active Problem List :     Patient Active Problem List   Diagnosis     Atrial fibrillation (H)      Hypothyroidism     Basal cell carcinoma of skin of face     CTS (carpal tunnel syndrome)     Myopia     Plantar fasciitis     Presbyopia     Regular astigmatism     Neoplasm of uncertain behavior of skin     Venous stasis     Type 2 diabetes mellitus with hyperglycemia, without long-term current use of insulin (H)     Morbid obesity (H)     Depression with anxiety     Fall from chair, initial encounter     Hyperlipidemia LDL goal <100     Fall     SOB (shortness of breath)     BiPAP (biphasic positive airway pressure) dependence     Lymphedema     Chronic systolic congestive heart failure (H)     Essential hypertension with goal blood pressure less than 140/90     Urinary retention     Hypokalemia     Constipation, unspecified constipation type            Past Medical History:     Past Medical History:   Diagnosis Date     Basal cell carcinoma            Past Surgical History:     Past Surgical History:   Procedure Laterality Date     BIOPSY OF SKIN LESION       MOHS MICROGRAPHIC PROCEDURE       PICC INSERTION Right 09/24/2017    5fr TL Bard PICC, 51cm (1cm external) in the R medial brachial vein w/ tip in the SVC.           Social History:     Social History   Substance Use Topics     Smoking status: Never Smoker     Smokeless tobacco: Never Used     Alcohol use No      Comment: Former alcohol abuse. Quit 70s then quit later in 80s-present           Family History:     Family History     Problem (# of Occurrences) Relation (Name,Age of Onset)    Depression (1) Mother       Negative family history of: CANCER           Family History reviewed and negative for except as mentioned above             Immunizations:   Immunization status is unknown          Allergies:   Review of patient's allergies indicates no known allergies.          Medications:     No current facility-administered medications on file prior to encounter.   Current Outpatient Prescriptions on File Prior to Encounter:  cephALEXin (KEFLEX) 500 MG  capsule Take 1 capsule (500 mg) by mouth 3 times daily   spironolactone (ALDACTONE) 25 MG tablet Take 1 tablet (25 mg) by mouth 2 times daily   warfarin (COUMADIN) 5 MG tablet Take 2.5 tablets (12.5 mg) by mouth daily   furosemide (LASIX) 20 MG tablet Take 2 tablets (40 mg) by mouth 2 times daily   potassium chloride SA (K-DUR/KLOR-CON M) 20 MEQ CR tablet Take 2 tablets (40 mEq) by mouth daily   senna-docusate (SENOKOT-S;PERICOLACE) 8.6-50 MG per tablet Take 1-2 tablets by mouth 2 times daily as needed for constipation   levothyroxine (SYNTHROID/LEVOTHROID) 150 MCG tablet Take 1 tablet (150 mcg) by mouth daily   lisinopril (PRINIVIL/ZESTRIL) 5 MG tablet Take 0.5 tablets (2.5 mg) by mouth daily   metFORMIN (GLUCOPHAGE) 500 MG tablet Take 1 tablet (500 mg) by mouth 2 times daily (with meals)   metoprolol (TOPROL-XL) 100 MG 24 hr tablet Take 1 tablet (100 mg) by mouth daily   multivitamin, therapeutic with minerals (MULTI-VITAMIN) TABS tablet Take 1 tablet by mouth daily   omega 3 1000 MG CAPS Take 1 g by mouth daily   polyethylene glycol (MIRALAX/GLYCOLAX) Packet Take 17 g by mouth daily as needed for constipation   tamsulosin (FLOMAX) 0.4 MG capsule Take 1 capsule (0.4 mg) by mouth daily   order for DME Equipment being ordered: Other: Velcro Compression stockings. Treatment Diagnosis: bilateral lymphedema.   aspirin 81 MG tablet Take 1 tablet (81 mg) by mouth daily   atorvastatin (LIPITOR) 40 MG tablet Take 1 tablet (40 mg) by mouth daily   ipratropium - albuterol 0.5 mg/2.5 mg/3 mL (DUONEB) 0.5-2.5 (3) MG/3ML neb solution Take 1 vial (3 mLs) by nebulization every 4 hours as needed for wheezing (Patient not taking: Reported on 11/27/2017)            Review of Systems:   Review of Systems   Constitutional: Negative for chills, fever and weight loss.   Eyes: Negative for blurred vision.   Respiratory: Positive for shortness of breath. Negative for cough, hemoptysis, sputum production and wheezing.    Cardiovascular:  Positive for chest pain, orthopnea, claudication, leg swelling and PND. Negative for palpitations.   Gastrointestinal: Positive for constipation. Negative for abdominal pain, blood in stool, diarrhea, melena, nausea and vomiting.   Genitourinary: Negative for dysuria, flank pain and frequency.   Musculoskeletal: Negative for back pain.   Skin: Positive for rash.   Neurological: Positive for headaches. Negative for dizziness and weakness.   Psychiatric/Behavioral: Negative for depression. The patient is not nervous/anxious.                Physical Exam:   Vitals were reviewed  Temp: 97.5  F (36.4  C) Temp src: Oral BP: 152/70   Heart Rate: 91   SpO2: 98 %      Physical Exam   Constitutional: No distress.   Morbidly obese    HENT:   Head: Normocephalic and atraumatic.   Eyes: Conjunctivae are normal.   Neck: Neck supple.   Cardiovascular: Normal rate, regular rhythm, normal heart sounds and intact distal pulses.  Exam reveals no gallop and no friction rub.    No murmur heard.  Pulmonary/Chest:   Distant breath sounds. Difficult exam due to body habitus.    Abdominal: He exhibits distension. He exhibits no mass. There is no tenderness. There is no rebound and no guarding.   Musculoskeletal: He exhibits edema (. ).   Skin: He is not diaphoretic.   Bilateral brawny  edema on both legs with stasis changes. Distal LE erythema and bullae formation . No discharge noted. Both LE are  tender on  palpation          Data:     Results for orders placed or performed during the hospital encounter of 01/05/18 (from the past 24 hour(s))   CBC with platelets differential   Result Value Ref Range    WBC 6.5 4.0 - 11.0 10e9/L    RBC Count 4.45 4.4 - 5.9 10e12/L    Hemoglobin 13.4 13.3 - 17.7 g/dL    Hematocrit 42.5 40.0 - 53.0 %    MCV 96 78 - 100 fl    MCH 30.1 26.5 - 33.0 pg    MCHC 31.5 31.5 - 36.5 g/dL    RDW 15.8 (H) 10.0 - 15.0 %    Platelet Count 182 150 - 450 10e9/L    Diff Method Automated Method     % Neutrophils 57.4 %    %  Lymphocytes 28.0 %    % Monocytes 10.2 %    % Eosinophils 3.6 %    % Basophils 0.6 %    % Immature Granulocytes 0.2 %    Nucleated RBCs 0 0 /100    Absolute Neutrophil 3.7 1.6 - 8.3 10e9/L    Absolute Lymphocytes 1.8 0.8 - 5.3 10e9/L    Absolute Monocytes 0.7 0.0 - 1.3 10e9/L    Absolute Eosinophils 0.2 0.0 - 0.7 10e9/L    Absolute Basophils 0.0 0.0 - 0.2 10e9/L    Abs Immature Granulocytes 0.0 0 - 0.4 10e9/L    Absolute Nucleated RBC 0.0    Comprehensive metabolic panel   Result Value Ref Range    Sodium 140 133 - 144 mmol/L    Potassium 3.3 (L) 3.4 - 5.3 mmol/L    Chloride 101 94 - 109 mmol/L    Carbon Dioxide 32 20 - 32 mmol/L    Anion Gap 6 3 - 14 mmol/L    Glucose 103 (H) 70 - 99 mg/dL    Urea Nitrogen 10 7 - 30 mg/dL    Creatinine 0.84 0.66 - 1.25 mg/dL    GFR Estimate >90 >60 mL/min/1.7m2    GFR Estimate If Black >90 >60 mL/min/1.7m2    Calcium 9.4 8.5 - 10.1 mg/dL    Bilirubin Total 1.1 0.2 - 1.3 mg/dL    Albumin 3.1 (L) 3.4 - 5.0 g/dL    Protein Total 7.6 6.8 - 8.8 g/dL    Alkaline Phosphatase 87 40 - 150 U/L    ALT 16 0 - 70 U/L    AST 16 0 - 45 U/L   Lactic acid   Result Value Ref Range    Lactic Acid 1.1 0.7 - 2.0 mmol/L   CRP inflammation   Result Value Ref Range    CRP Inflammation 6.2 0.0 - 8.0 mg/L   INR   Result Value Ref Range    INR 2.01 (H) 0.86 - 1.14   Magnesium   Result Value Ref Range    Magnesium 2.0 1.6 - 2.3 mg/dL   EKG 12 lead   Result Value Ref Range    Interpretation ECG Click View Image link to view waveform and result    Chest  XR, 1 view portable    Narrative    XR CHEST PORT 1 VW  1/5/2018 2:10 PM      HISTORY: Dyspnea;     COMPARISON: 10/4/2017    FINDINGS: Portable AP view of the chest. Hazy right basilar opacities.  No pneumothorax or or pleural effusion. Heart size is unchanged.      Impression    IMPRESSION: Hazy right basilar atelectasis, infection or aspiration.      All cardiac studies reviewed by me.   All imaging studies reviewed by me.        Assessment and Plan:    Assessment:     This is a 67 year old male with a past medical history significant for HFrEF, chronic venous stasis, atrial fibrillation, DM-Type-2, PVD, morbid obesity, and depression who presented to the hospital for concerns of lower extremities cellulitis.  Patient Active Problem List   Diagnosis     Atrial fibrillation (H)     Hypothyroidism     Basal cell carcinoma of skin of face     CTS (carpal tunnel syndrome)     Myopia     Plantar fasciitis     Presbyopia     Regular astigmatism     Neoplasm of uncertain behavior of skin     Venous stasis     Type 2 diabetes mellitus with hyperglycemia, without long-term current use of insulin (H)     Morbid obesity (H)     Depression with anxiety     Fall from chair, initial encounter     Hyperlipidemia LDL goal <100     Fall     SOB (shortness of breath)     BiPAP (biphasic positive airway pressure) dependence     Lymphedema     Chronic systolic congestive heart failure (H)     Essential hypertension with goal blood pressure less than 140/90     Urinary retention     Hypokalemia     Constipation, unspecified constipation type         Plan:   ## Bilateral LE cellulitis:   ## Chronic Venous stasis with dermatitis:   Worsening erythema and bullae formation in the setting of chronic venous stasis. Patient is hemodynamically stable with unremarkable inflammatory.infectious markers. Suspicion for infection is low however given worsening erythema, comorbidity (CHF and Diabetes).   - Bactrim 800-160 mg BID   - lactobacillus BID   - CBC and CRP am   - Lymphedema/Physical and occupational therapy consult placed  - Bilateral Venous doppler ordered. Rule out DVT   - Continue to monitor vitals closely     ## Symptomatic HFrEF:   ## Pulmonary congestion:   Chronic dyspnea, orthopnea, paroxysmal dyspnea and worsening edema in the setting of obesity, obesity hypoventilation syndrome, sleep apnea and heart failure. ECHO on 09/17 with normal EF, however ECHO in 2008 showed EF 40-45%.  Cardiologist Dr. Willoughby saw patient. Recommendations appreciated. Chest xray on admission consistent with pulmonary congestion. About 20 pounds weight gain since 12/2017  - Aggressive diuresis IV lasix 40 mg BID (Likely require more lasix). Goal 2L daily   - BMP and electrolytes BID   - Daily weights and strict I/O   - Cardiology consult placed   - Continue to monitor vitals closely   - Continue CPAP with home settings   - Continue PTA Lisinopril 2.5 mg daily, Metoprolol 100 mg daily, and Spironolactone 25 mg daily.    ## Obesity Hypoventilation syndrome:  ## Sleep Apnea:   - Continue diuresis per above   - Continue CPAP with home settings    ## Coronary Artery Disease:   ## PAD:   ## Essential Hypertension:   - Continue PTA Aspirin 81 mg daily   - Continue high intensity Lipitor 40 mg daily   - Continue PTA BP medications per above     ## Atrial Fibrillation:   CHADS-VaSC 5.  - Continue PTA Metoprolol 100 mg daily   - Pharmacy to dose Warfarin   - Daily INR   - Telemetry monitoring     ## Hypothyroidism:    TSH (11/2017) elevated at 4.89.   - Continue PTA Levothyroxine 150 mcg daily   - Consider dose adjustment outpatient.     ## DM-Type-2: Controlled   A1c (11/2017): 6.3  - Hold PTA metformin 500 mg BID   - MSSI     ## BPH: Chronic, stable   - Continue PTA Flomax 0.4 mg daily     Daily cares -   F: PO   E: stable  N: Low fat diet   Lines: PIV,   Activity:-Up as tolerated  CODE:Full Code  Prophylaxis: Warfarin   PCP communication:  - Matthias Sanchez  - Contacted at admission: No  - Concerns or updates: None   Dispo: Expected discharge date: 2-3 days pending clinical improvement      Discussed with faculty but not examined by faculty.    Jerry Jain MD  PGY3 [facility] Resident   Pager: [phone]

## 2018-01-05 NOTE — LETTER
Transition Communication Hand-off for Care Transitions to Next Level of Care Provider    Name: Clarke Moreira  MRN #: [id number]  Primary Care Provider: Matthias Sanchez     Primary Clinic: [address] [address]     Reason for Hospitalization:    Chronic systolic congestive heart failure (H) [I50.22]  Cellulitis of right upper extremity [L03.113]  Cellulitis of left upper extremity [L03.114]  Stasis dermatitis of both legs [I87.2]  Hypervolemia, unspecified hypervolemia type [E87.70]    Admit Date/Time: 1/5/2018 10:35 AM  Discharge Date: 1/10/2018    Payor Source: Payor: UCARE / Plan: UCARE FOR S[employer]IORS / Product Type: HMO /     Discharge Needs Assessment:  Needs       Most Recent Value    Equipment Currently Used at Home cane, straight, walker, rolling, hospital bed, other (see comments)   [bariatric recliner]    DME [facility] [phone], Fax: [phone]    DME Equipment Ordered respiratory supplies    Home Care [facility] [phone], Fax: [phone]   [RN, PT, OT]        Follow-up plan:  Future Appointments  Date Time Provider Department Center   1/15/2018 9:20 AM Jesús Lagos DPM [facility]   1/18/2018 11:00 AM Rebekah Reddy APRN [facility]       Any outstanding tests or procedures:    Procedures     Future Labs/Procedures    Oxygen Adult     Comments:    New Home Oxygen Order 2 liter(s) by nasal cannula continuously with use of portable tank. Expected treatment length is indefinite (99 months).. Test on conserving device as applicable.    Patients who qualify for home O2 coverage under the CMS guidelines require ABG tests or O2 sat readings obtained closest to, but no earlier than 2 days prior to the discharge, as evidence of the need for home oxygen therapy. Testing must be performed while patient is in the chronic stable state. See notes for O2 sats.    I certify that this patient, Clarke Moreira has  been under my care and that I, or a nurse practitioner or physician's assistant working with me, had a face-to-face encounter that meets the face-to-face encounter requirements with this patient on 1/9/2018. The patient, Clarke Moreira was evaluated or treated in whole, or in part, for the following medical condition, which necessitates the use of the ordered oxygen. Treatment Diagnosis: Chronic systolic congestive heart failure (H) [I50.22]    Attending Provider: Dr. Varela  Physician signature: See electronic signature associated with these discharge orders  Date of Order: January 9, 2018          Referrals     Future Labs/Procedures    Cardiac Rehab Referral     Comments:    Your provider has referred you to: [facility]: [facility] [phone]   [email or web address]    Home care nursing referral     Comments:    [email or web address]  Phone  [phone]  Fax  [phone]    Resumption of Services    RN skilled nursing visits and home safety eval.   RN to assess vital signs and weight, respiratory and cardiac status, pain level and activity tolerance, hydration, nutrition and bowel status  RN to complete medication management and assist with refills from mail order pharmacy.  RN to monitor lower extremeties for signs/symptoms of cellulitis.    RN to complete lab draws Friday 1/12 or Monday 1/15: INR and BMP  RN to call lab results into PCP Dr. Matthias Sanchez    Your provider has ordered home care nursing services.   If you have not been contacted within 2 days of your discharge please call.    Home Care OT Referral for Hospital Discharge     Comments:    [facility]  Phone  [phone]  Fax  [phone]    OT to eval and treat  OT to eval and treat for lymphedema wrapping    Your provider has ordered home care - occupational therapy.   If you have not been contacted within 2 days of your discharge please call    Home  Care PT Referral for Hospital Discharge     Comments:    [facility]  Phone  [phone]  Fax  [phone]    PT to eval and treat    Your provider has ordered home care - physical therapy.   If you have not been contacted within 2 days of your discharge please call    Medication Therapy Management Referral     Comments:    Reason for referral:  on more than 5 medications and managing chronic disease, on more than 10 medications and hospitalized or in the ED in the past 6 months  and adherence to medication regimen    This service is designed to help you get the most from your medications.  A specially trained pharmacist will work closely with you and your doctors  to solve any problems related to your medications and to help you get the   best results from taking them.      The Medication Therapy Management staff will call you to schedule an appointment.            Key Recommendations:  Please review AVS    Kimberlyn GODOY RN PHN  Patient Care Management Coordinator  [facility]  Phone: [phone] / Pager: [phone]      AVS/Discharge Summary is the source of truth; this is a helpful guide for improved communication of patient story

## 2018-01-05 NOTE — TELEPHONE ENCOUNTER
[facility] phone call message- general phone call:    Reason for call: Stanley wanted to update team that his circulation seems to have gotten worse over night. She states that they will likely have the patient go to the ED. Please call Stanley with any questions.    Return call needed: Call with questions     OK to leave a message on voice mail? Yes    Primary language: English      needed? No    Call taken on January 5, 2018 at 9:37 AM by Amanda Dumont

## 2018-01-05 NOTE — PROGRESS NOTES
"Social Work: Assessment with Discharge Plan    Patient Name:  Clarke Moreira  :  1950  Age:  67 year old  MRN:  [id number]  Risk/Complexity Score:     Completed assessment with:  Chart review, patient    Presenting Information   Reason for Referral:  Discharge plan  Date of Intake:  2018  Referral Source:  Chart Review  Decision Maker:  patient  Alternate Decision Maker:  Friends - Janice Hillman (p) [phone] or Dre Lucas (p) [phone]  Health Care Directive:  Will bring in copy  Living Situation:  Apartment  Previous Functional Status:  Assistance with Transportation:  [employer] and Medication Management:  home care RNs  Patient and family understanding of hospitalization:  \"I've got a lot of fluid on me and my legs have wounds.\"  Cultural/Language/Spiritual Considerations:  none  Adjustment to Illness:  Reports he was reluctant to come to ED, has been told by various MDs/RNs that he needs to come in so finally agreed.     Physical Health  Reason for Admission:  No diagnosis found.  Services Needed/Recommended:  Home with homecare    Mental Health/Chemical Dependency  Diagnosis:  Has self reported (prior SW assessment) hx of PTSD, depression & panic attacks  Support/Services in Place:  none  Services Needed/Recommended:  Supportive counseling may be beneficial    Support System  Significant relationship at present time:  Home care RNs  Family of origin is available for support:  none  Other support available:  Friends, but unclear ability for hands-on support  Gaps in support system:  Resides alone and has had hx of limited ability to care for self  Patient is caregiver to:  None     Provider Information   Primary Care Physician:  Matthais Sanchez   [phone]   Clinic:  [address] [place] [address] [address] [address]      :  None reported     Financial   Income Source:  disability  Financial Concerns:  Not discussed at time of assessment  Insurance:  "   Payor/Plan Subscriber Name Rel Member # Group #   UCARE - UCARE FOR AJIT SWANSON*  [id number] [facility]      [address]       Discharge Plan   Patient and family discharge goal:  home  Provided education on discharge plan:  YES  Patient agreeable to discharge plan:  YES  A list of Medicare Certified Facilities was provided to the patient and/or family to encourage patient choice. Patient's choices for facility are:  Not applicable at time of assessment although he has been to [facility] in the past  Will NH provide Skilled rehabilitation or complex medical:  YES  General information regarding anticipated insurance coverage and possible out of pocket cost was discussed. Patient and patient's family are aware patient may incur the cost of transportation to the facility, pending insurance payment: YES  Barriers to discharge:  TBD - Pending patient's progress and further recommendation. Patient reluctant to initiate hospital visit and would prefer home d/c.    Discharge Recommendations   Anticipated Disposition:  Home with services  Transportation Needs:  Medical:  Wheelchair  Name of Transportation Company and Phone:  Will need to discuss once ready for d/c. Typically uses [employer] but would need 3 day notice.     Additional comments   SW consulted via chart review given patient's prior hospitalizations, TCU placements and concern for self-neglect in home setting. SW met with patient, Ajit, to discuss an updated assessment. Ajit is alert and oriented, maintains eye contact, affect anxious and some irritability. Ajit initially appears guarded when discussing his home situation.     Ajit is a 57 year old  single male [facility] has [facility] incl RN and OT, assistance with lymphedema care and wraps. During a prior hospitalization, Ajit d/c to [facility] the subsequently d/c to home setting with home care. Ajit reports he ambulates using  a walker and uses [employer] for transport. He has [facility] for his rx's and notes they do mail order since it is very difficult to him to get to/from pharmacy same day. Clarke aware and familiar with his apartment building M[place]. Clarke notes his goal is to d/c to home setting. He shared that he felt reluctant to present to ED, but after multiple providers recommending he come here, he felt he should.     MERVAT inquired about rx's issues since noted that he had not been taking his Lasix for ~ 10 days. Clarke reported that while [facility] manages his ongoing rx's well, he has had trouble with rx dosage changes and new rx's. He has spoken to them and they are working with his PCP office. There is also a delay since he is mail order program. He denies ability to get transport to obtain rx's mostly because he has to schedule transport in advance and also d/t his limited mobility.     Of note, adult protection report filed during last admission d/t concerns of self-neglect (report #[id number]) and intake had been assigned to MERVAT Goodwin (P: [phone]). Today, ED SW contacted her to see if case opened or closed and if she is able to provide any information.     Plan: TBD - Pending patient's progress and further recommendation. Clarke requests a home d/c once medically stable. Will resume [facility] services. Clarke declined need for MERVAT to contact his friends for update.     Neha Sauceda, JANE, [facility]  Social Work Services, Emergency Dept [facility]  Pager: [phone] Mon-Sat 9 am - 9 pm, on-call/after hours pager [phone]

## 2018-01-05 NOTE — PHARMACY-ADMISSION MEDICATION HISTORY
Admission medication history interview status for the 1/5/2018 admission is complete. See Epic admission navigator for allergy information, pharmacy, prior to admission medications and immunization status.     Medication history interview sources:  Patient    Changes made to PTA medication list (reason)  Added: None  Deleted: None  Changed: None    Additional medication history information (including reliability of information, actions taken by pharmacist): Patient was a reliable historian he confirmed medications, doses, directions and when last taken.     Of note,   - The patient took his dose of warfarin on the day of admission (1/5)    Warfarin:  Clinic: [facility]  Indication: Afib, DVT  Goal: 2-3  Regimen: 12.5 mg daily  Interactions: Aspirin (pta)      Prior to Admission medications    Medication Sig Last Dose Taking? Auth Provider   cephALEXin (KEFLEX) 500 MG capsule Take 1 capsule (500 mg) by mouth 3 times daily 1/4/2018 at Unknown time Yes Matthias Sanchez MD   spironolactone (ALDACTONE) 25 MG tablet Take 1 tablet (25 mg) by mouth 2 times daily 1/4/2018 at 2000 Yes Matthias Sanchez MD   warfarin (COUMADIN) 5 MG tablet Take 2.5 tablets (12.5 mg) by mouth daily 1/5/2018 at 0800 Matthias Syed MD   furosemide (LASIX) 20 MG tablet Take 2 tablets (40 mg) by mouth 2 times daily 1/5/2018 at 0800 Yes Matthias Sanchez MD   potassium chloride SA (K-DUR/KLOR-CON M) 20 MEQ CR tablet Take 2 tablets (40 mEq) by mouth daily 1/4/2018 at 2000 Matthias Syed MD   levothyroxine (SYNTHROID/LEVOTHROID) 150 MCG tablet Take 1 tablet (150 mcg) by mouth daily 1/4/2018 at 2000 Yes Matthias Sanchez MD   lisinopril (PRINIVIL/ZESTRIL) 5 MG tablet Take 0.5 tablets (2.5 mg) by mouth daily 1/5/2018 at 0800 Matthias Syed MD   metFORMIN (GLUCOPHAGE) 500 MG tablet Take 1 tablet (500 mg) by mouth 2 times daily (with meals) 1/5/2018 at 0800 Matthias Syed MD   metoprolol (TOPROL-XL) 100 MG 24 hr tablet  Take 1 tablet (100 mg) by mouth daily 1/4/2018 at 2000 Yes Matthias Sanchez MD   multivitamin, therapeutic with minerals (MULTI-VITAMIN) TABS tablet Take 1 tablet by mouth daily 1/5/2018 at 0800 Yes Matthias Sanchez MD   omega 3 1000 MG CAPS Take 1 g by mouth daily 1/4/2018 at 2000 Yes Matthias Sanchez MD   polyethylene glycol (MIRALAX/GLYCOLAX) Packet Take 17 g by mouth daily as needed for constipation Past Month at Unknown time Yes Matthias Sanchez MD   tamsulosin (FLOMAX) 0.4 MG capsule Take 1 capsule (0.4 mg) by mouth daily 1/4/2018 at 2000 Yes Matthias Sanchez MD   aspirin 81 MG tablet Take 1 tablet (81 mg) by mouth daily 1/5/2018 at 0800 Yes Ari Reyna MD   atorvastatin (LIPITOR) 40 MG tablet Take 1 tablet (40 mg) by mouth daily Past Month at Unknown time Yes Ari Reyna MD   senna-docusate (SENOKOT-S;PERICOLACE) 8.6-50 MG per tablet Take 1-2 tablets by mouth 2 times daily as needed for constipation   Matthias Sanchez MD   order for DME Equipment being ordered: Other: Velcro Compression stockings.   Treatment Diagnosis: bilateral lymphedema.   Oswald Mcneal MD   ipratropium - albuterol 0.5 mg/2.5 mg/3 mL (DUONEB) 0.5-2.5 (3) MG/3ML neb solution Take 1 vial (3 mLs) by nebulization every 4 hours as needed for wheezing  Patient not taking: Reported on 11/27/2017   Ari Reyna MD         Medication history completed by: Bernardo Wakefield, PharmD

## 2018-01-05 NOTE — TELEPHONE ENCOUNTER
RN called home care and left VM informing medication sent to pharmacy. Advised to call with any questions prior to upcoming appointment.    Carla Gonzales RN

## 2018-01-05 NOTE — PLAN OF CARE
Pt arrived to 5A from ED @ ~ 1600.  Report received form Tiana GERONIMO.  Pt's belongings in room.  Stood and pivoted with SBA to bed.  VSS on RA.  MD notified and orders acknowledged.

## 2018-01-05 NOTE — IP AVS SNAPSHOT
MRN:[id number]                      After Visit Summary   1/5/2018    Clarke Moreira    MRN: [id number]           Thank you!     Thank you for choosing [facility] for your care. Our goal is always to provide you with excellent care. Hearing back from our patients is one way we can continue to improve our services. Please take a few minutes to complete the written survey that you may receive in the mail after you visit with us. Thank you!        Patient Information     Date Of Birth          1950        Designated Caregiver       Most Recent Value    Caregiver    Will someone help with your care after discharge? yes    Name of designated caregiver [facility] [[facility]]    Phone number of caregiver [phone]    Caregiver address [address] [place] [address]      About your hospital stay     You were admitted on:  January 5, 2018 You last received care in the:  [address] [facility]    You were discharged on:  January 10, 2018        Reason for your hospital stay       Cellulitis LE.   Chronic systolic congestive heart failure (h)  Hypervolemia, unspecified hypervolemia type  Stasis dermatitis of both legs  Chronic atrial fibrillation (h)                  Who to Call     For medical emergencies, please call 911.  For non-urgent questions about your medical care, please call your primary care provider or clinic, [phone]          Attending Provider     Provider Specialty    Pop Moody MD Emergency Medicine    Devorah Torres DO [facility]       Primary Care Provider Office Phone # Fax #    Matthias Sanchez [phone]       When to contact your care team       Call your primary doctor if you have any of the following:  increased shortness of breath.                  After Care Instructions     Activity       Your activity upon discharge: activity as tolerated            Diet       Follow this diet upon discharge: Orders Placed This  Encounter      Fluid restriction 1500 ML FLUID      2 Gram Sodium Diet            Discharge Instructions       I increased your Lasix dose to 60 mg twice a day. Please discuss this medication with your primary care physician and Cardiologist.    We have been following your INR. It should be rechecked on Friday. Tonight (1/10) take ONLY 10mg of warfarin, then continue your normal dose of 12.5mg daily.            Oxygen Adult       [facility] Order 2 liter(s) by nasal cannula continuously with use of portable tank. Expected treatment length is indefinite (99 months).. Test on conserving device as applicable.    Patients who qualify for home O2 coverage under the CMS guidelines require ABG tests or O2 sat readings obtained closest to, but no earlier than 2 days prior to the discharge, as evidence of the need for home oxygen therapy. Testing must be performed while patient is in the chronic stable state. See notes for O2 sats.    I certify that this patient, Clarke Moreira has been under my care and that I, or a nurse practitioner or physician's assistant working with me, had a face-to-face encounter that meets the face-to-face encounter requirements with this patient on 1/9/2018. The patient, Clarke Moreira was evaluated or treated in whole, or in part, for the following medical condition, which necessitates the use of the ordered oxygen. Treatment Diagnosis: Chronic systolic congestive heart failure (H) [I50.22]    Attending Provider: Dr. Varela  Physician signature: See electronic signature associated with these discharge orders  Date of Order: January 9, 2018                  Follow-up Appointments     Adult [facility] Follow-up and recommended labs and tests       Follow up with primary care provider, Matthias Sanchez, Friday or Monday, within 7 days,  for hospital follow- up.  The following labs/tests are recommended: BMP, Magnesium, Phosphorus, INR.     Please follow up with scheduled Cardiology [facility]  clinic.     Appointments on [place] and/or [facility] (with [facility] or [facility] provider or service). Call [phone] if you haven't heard regarding these appointments within 7 days of discharge.                  Your next 10 appointments already scheduled     Danny 15, 2018  9:20 AM CST   (Arrive by 9:05 AM)   NEW FOOT/ANKLE with Jesús Lagos DPM   [facility] ([facility])    [address]  4th Floor  [facility] [address] [phone]            Jan 18, 2018 11:00 AM CST   New Sleep Patient with REAGAN Claros CNP   [facility], [place], Sleep Study ([facility], [facility])    [address] [place] [address] [phone]              Additional Services     Cardiac Rehab Referral       Your provider has referred you to: [facility]: [facility] ([facility]) [facility] [phone]   [email or web address]            Home Care OT Referral for Hospital Discharge       [facility]  Phone  [phone]  Fax  [phone]    OT to eval and treat  OT to eval and treat for lymphedema wrapping    Your provider has ordered home care - occupational therapy.   If you have not been contacted within 2 days of your discharge please call            Home Care PT Referral for Hospital Discharge       [facility]  Phone  [phone]  Fax  [phone]    PT to eval and treat    Your provider has ordered home care - physical therapy.   If you have not been contacted within 2 days of your discharge please call            Home care nursing referral       [facility]  Phone  [phone]  Fax  [phone]    Resumption of Services    RN skilled nursing visits and home safety eval.   RN to assess vital signs and weight, respiratory and cardiac status, pain level and activity tolerance, hydration, nutrition and bowel status  RN to complete  medication management and assist with refills from mail order pharmacy.  RN to monitor lower extremeties for signs/symptoms of cellulitis.    RN to complete lab draws Friday 1/12 or Monday 1/15: INR and BMP  RN to call lab results into PCP Dr. Matthias Sanchez    Your provider has ordered home care nursing services.   If you have not been contacted within 2 days of your discharge please call.            Medication Therapy Management Referral       Reason for referral:  on more than 5 medications and managing chronic disease, on more than 10 medications and hospitalized or in the ED in the past 6 months  and adherence to medication regimen    This service is designed to help you get the most from your medications.  A specially trained pharmacist will work closely with you and your doctors  to solve any problems related to your medications and to help you get the   best results from taking them.      The Medication Therapy Management staff will call you to schedule an appointment.                  Further instructions from your care team       HEART FAILURE DISCHARGE INSTRUCTIONS:  1.  We would like you to see someone in the [facility] clinic within one week of hospital discharge.  2.  Please weigh yourself daily.  Call the cardiology clinic and ask to speak with a [facility] nurse if you were to experience any weight gain (3 lbs overnight or 5 lbs in one week).  3.  Cardiology clinic number is [phone].  4.  Limit salt intake to less than 2g per day.  5.  Limit fluid intake to less than 2L per day.  6.  Reminder that you are scheduled for your sleep study on 1/18.    Oxygen Provider:  Arranged through [employer], contact number [phone].  If you have any questions or concerns please call the oxygen company directly.      Warfarin Instruction     You have started taking a medicine called warfarin. This is a blood-thinning medicine (anticoagulant). It helps prevent and treat blood clots.      Before  "leaving the hospital, make sure you know how much to take and how long to take it.      You will need regular blood tests to make sure your blood is clotting safely. It is very important to see your doctor for regular blood tests.    Talk to your doctor before taking any new medicine (this includes over-the-counter drugs and herbal products). Many medicines can interact with warfarin. This may cause more bleeding or too much clotting.     Eating a lot of vitamin K--found in green, leafy vegetables--can change the way warfarin works in your body. Do NOT avoid these foods. Instead, try to eat the same amount each day.     Bleeding is the most common side-effect of warfarin. You may notice bleeding gums, a bloody nose, bruises and bleeding longer when you cut yourself. See a doctor at once if:   o You cough up blood  o You find blood in your stool (poop)  o You have a deep cut, or a cut that bleeds longer than 10 minutes   o You have a bad cut, hard fall, accident or hit your head (go to urgent care or the emergency room).    For women who can get pregnant: This medicine can harm an unborn baby. Be very careful not to get pregnant while taking this medicine. If you think you might be pregnant, call your doctor right away.    For more information, read \"Guide to Warfarin Therapy,  the booklet you received in the hospital.        General Recommendations To Control Heart Failure When You Get Home       Heart Failure Instructions for Patients and Families: Please read and check off each of these important instructions as you do them when you get home.          Weight and Symptoms       ___ Put a scale in your bathroom.    ___ Post a weight chart or calendar next to your scale.    ___ Weigh yourself everyday as soon as you get up in the morning (before breakfast).  You should only be wearing your pajamas.  Write your weight on the chart/calendar.  ___ Bring your weight chart/calendar with you to all appointments.  ___ Call " your doctor or nurse practitioner if you gain 2 pounds (in 1 day) or 5 pounds in (1 week) from your goal  good  weight.  Your good weight is also called your  dry  weight.  Your doctor or nurse will tell you what your good weight should be.    ___ Call your doctor or nurse practitioner if you have shortness of breath that gets worse over time, leg swelling or fatigue.       Medications and Diet       ___ Make sure to take your medication as prescribed.    ___ Bring a current list of your medication and all of your medicine bottles with you to all appointments.    ___ Limit fluids if you still have swelling or shortness of breath, or if your doctor tells you to do so.    ___ Eat less than 2000 mg of sodium (salt) every day. Read food labels, and do not add salt to meals. Remember, if you eat less salt you retain less fluid.  ___ Follow a heart healthy diet that is low in saturated fat.        Activity and Suggested Lifestyle Changes      ___ Stay active. Talk to your doctor about an exercise program that is safe for your heart.  ___ Stop smoking. Reduce alcohol use.      ___ Lose weight if you are overweight. Extra weight puts a lot of stress on the heart.                 Control for Leg Swelling     ___ Keep your legs elevated (raised) as needed for swelling. If swelling is uncomfortable or elevation doesn t help, ask your doctor about using ACE wraps or support stockings.        What is the C.O.R.E. Clinic?  Cardiomyopathy  Optimization  Rehabilitation  Education    The [facility].O.R.E. Clinic is a heart failure specialty clinic within [facility].  It is an outpatient disease management program that is based on a phase-by-phase approach, which is tailored to each patient s individual needs.  The cardiologist, nurse practitioner, physician assistant and nurses provide an ongoing outpatient care and treatment plan that guides heart failure and cardiomyopathy patients from evaluation and education to stabilization.  This team works with your current primary care doctor and cardiologist to help you:    - Avoid hospitalizations  - Slow the progression of the disease  - Improve length and quality of life  - Know who and when to call if heart failure symptoms appear  - Receive easy access to quality health care and advice  - Better understand your condition and treatment  - Decrease the tremendous cost burden of heart failure care  - Detect future heart problems before they become life threatening                                 Follow-up Appointments     ___ An appointment with the [facility] Clinic may already have been made for you (see section   Your next appointments already scheduled ).  If you have a question about your appointment, would like to speak with a C.O.R.E. nurse, or would like to become a [facility].O.R.E. Clinic patient, please call one of the following locations:     - [facility]):                                             [phone]  - [facility]):                                            [phone]  - [facility]):                 [phone]      ___ Your C.O.R.E. Clinic Team will continue to educate you on your heart failure and may adjust medications based on your vital signs, lab work, and how you are feeling.  Therefore, it is very important to bring the following to all C.O.R.E. appointments:    - An accurate list of your medications  - Your medicine bottles  - Your weight chart/calendar                   Pending Results     Date and Time Order Name Status Description    1/5/2018 1554 Blood culture Preliminary     1/5/2018 1554 Blood culture Preliminary             Statement of Approval     Ordered          01/10/18 0821  I have reviewed and agree with all the recommendations and orders detailed in this document.  EFFECTIVE NOW     Approved and electronically signed by:  hDiraj Varela MD             Admission  "Information     Date & Time Provider Department Dept. Phone    2018 Devorah Torres, DO [address] [facility] [place] [phone]      Your Vitals Were     Blood Pressure Pulse Temperature Respirations Weight Pulse Oximetry    100/63 (BP Location: Left arm) 110 96.5  F (35.8  C) (Oral) 18 168.3 kg (371 lb 1.6 oz) 97%    BMI (Body Mass Index)                   50.33 kg/m2           [facility] Information     [employer] lets you send messages to your doctor, view your test results, renew your prescriptions, schedule appointments and more. To sign up, go to [email or web address] . Click on \"Log in\" on the left side of the screen, which will take you to the Welcome page. Then click on \"Sign up Now\" on the right side of the page.     You will be asked to enter the access code listed below, as well as some personal information. Please follow the directions to create your username and password.     Your access code is: [email or web address]  Expires: 2018 12:33 PM     Your access code will  in 90 days. If you need help or a new code, please call your [facility] clinic or [phone].        Care EveryWhere ID     This is your Care EveryWhere ID. This could be used by other organizations to access your [facility] medical records  [id number]        Equal Access to Services     TYLER RICO AH: Hadbeni enriquez Sodillan, waaxda luqadaha, qaybta kaalmada vasyl banda . So [facility] [phone].    ATENCIÓN: Si habla español, tiene a kim disposición servicios gratuitos de asistencia lingüística. Alexis al [phone].    We comply with applicable federal civil rights laws and [place] laws. We do not discriminate on the basis of race, color, national origin, age, disability, sex, sexual orientation, or gender identity.               Review of your medicines      START taking        Dose / Directions    lactobacillus rhamnosus (GG) capsule        Dose:  1 capsule   Take 1 capsule by mouth 2 times " daily   Quantity:  14 capsule   Refills:  0       sulfamethoxazole-trimethoprim 800-160 MG per tablet   Commonly known as:  BACTRIM DS/SEPTRA DS   Indication:  Pyelonephritis, Infection of the Skin and/or Related Soft Tissue        Dose:  1 tablet   Take 1 tablet by mouth 2 times daily for 1 dose   Quantity:  1 tablet   Refills:  0         CONTINUE these medicines which may have CHANGED, or have new prescriptions. If we are uncertain of the size of tablets/capsules you have at home, strength may be listed as something that might have changed.        Dose / Directions    furosemide 20 MG tablet   Commonly known as:  LASIX   This may have changed:  how much to take   Used for:  Chronic congestive heart failure, unspecified congestive heart failure type (H)        Dose:  60 mg   Take 3 tablets (60 mg) by mouth 2 times daily   Quantity:  60 tablet   Refills:  0       levothyroxine 175 MCG tablet   Commonly known as:  SYNTHROID/LEVOTHROID   This may have changed:    - medication strength  - how much to take   Used for:  Hypothyroidism, unspecified type        Dose:  175 mcg   Take 1 tablet (175 mcg) by mouth daily   Quantity:  30 tablet   Refills:  0       lisinopril 2.5 MG tablet   Commonly known as:  PRINIVIL/Zestril   This may have changed:    - medication strength  - when to take this   Used for:  Essential hypertension with goal blood pressure less than 140/90, Acute diastolic congestive heart failure (H), Chronic congestive heart failure, unspecified congestive heart failure type (H)        Dose:  2.5 mg   Take 1 tablet (2.5 mg) by mouth 2 times daily   Quantity:  30 tablet   Refills:  0       * potassium chloride SA 20 MEQ CR tablet   Commonly known as:  K-DUR/KLOR-CON M   Indication:  hypokalemia   This may have changed:  Another medication with the same name was added. Make sure you understand how and when to take each.   Used for:  Hypokalemia        Dose:  40 mEq   Take 2 tablets (40 mEq) by mouth daily    Quantity:  60 tablet   Refills:  11       * potassium chloride SA 20 MEQ CR tablet   Commonly known as:  K-DUR/KLOR-CON M   This may have changed:  You were already taking a medication with the same name, and this prescription was added. Make sure you understand how and when to take each.   Used for:  Chronic systolic congestive heart failure (H)        Dose:  40 mEq   Take 2 tablets (40 mEq) by mouth daily   Quantity:  30 tablet   Refills:  0       * warfarin 5 MG tablet   Commonly known as:  COUMADIN   This may have changed:  Another medication with the same name was added. Make sure you understand how and when to take each.   Used for:  Persistent atrial fibrillation (H)        Dose:  12.5 mg   Take 2.5 tablets (12.5 mg) by mouth daily   Quantity:  75 tablet   Refills:  11       * warfarin 10 MG tablet   Commonly known as:  COUMADIN   This may have changed:  You were already taking a medication with the same name, and this prescription was added. Make sure you understand how and when to take each.   Used for:  Chronic atrial fibrillation (H)        Dose:  10 mg   Take 1 tablet (10 mg) by mouth once for 1 dose   Quantity:  1 tablet   Refills:  0       * Notice:  This list has 4 medication(s) that are the same as other medications prescribed for you. Read the directions carefully, and ask your doctor or other care provider to review them with you.      CONTINUE these medicines which have NOT CHANGED        Dose / Directions    aspirin 81 MG tablet   Used for:  Essential hypertension        Dose:  81 mg   Take 1 tablet (81 mg) by mouth daily   Quantity:  30 tablet   Refills:  0       atorvastatin 40 MG tablet   Commonly known as:  LIPITOR   Used for:  Type 2 diabetes mellitus without complication, without long-term current use of insulin (H)        Dose:  40 mg   Take 1 tablet (40 mg) by mouth daily   Quantity:  30 tablet   Refills:  0       ipratropium - albuterol 0.5 mg/2.5 mg/3 mL 0.5-2.5 (3) MG/3ML neb  solution   Commonly known as:  DUONEB   Used for:  SOB (shortness of breath)        Dose:  3 mL   Take 1 vial (3 mLs) by nebulization every 4 hours as needed for wheezing   Quantity:  360 mL   Refills:  3       metFORMIN 500 MG tablet   Commonly known as:  GLUCOPHAGE   Used for:  Type 2 diabetes mellitus without complication, without long-term current use of insulin (H)        Dose:  500 mg   Take 1 tablet (500 mg) by mouth 2 times daily (with meals)   Quantity:  60 tablet   Refills:  11       metoprolol 100 MG 24 hr tablet   Commonly known as:  TOPROL-XL   Used for:  Essential hypertension with goal blood pressure less than 140/90        Dose:  100 mg   Take 1 tablet (100 mg) by mouth daily   Quantity:  30 tablet   Refills:  11       multivitamin, therapeutic with minerals Tabs tablet   Used for:  Type 2 diabetes mellitus without complication, without long-term current use of insulin (H)        Dose:  1 tablet   Take 1 tablet by mouth daily   Quantity:  30 each   Refills:  11       omega 3 1000 MG Caps   Used for:  Hyperlipidemia LDL goal <100        Dose:  1 g   Take 1 g by mouth daily   Quantity:  30 capsule   Refills:  11       order for DME   Used for:  Lymphedema of extremity        Equipment being ordered: Other: Velcro Compression stockings.  Treatment Diagnosis: bilateral lymphedema.   Quantity:  2 each   Refills:  0       polyethylene glycol Packet   Commonly known as:  MIRALAX/GLYCOLAX   Used for:  Constipation, unspecified constipation type        Dose:  17 g   Take 17 g by mouth daily as needed for constipation   Quantity:  15 packet   Refills:  11       senna-docusate 8.6-50 MG per tablet   Commonly known as:  SENOKOT-S;PERICOLACE   Used for:  Constipation, unspecified constipation type        Dose:  1-2 tablet   Take 1-2 tablets by mouth 2 times daily as needed for constipation   Quantity:  60 tablet   Refills:  11       spironolactone 25 MG tablet   Commonly known as:  ALDACTONE   Used for:  Chronic  systolic congestive heart failure (H)        Dose:  25 mg   Take 1 tablet (25 mg) by mouth 2 times daily   Quantity:  60 tablet   Refills:  3       tamsulosin 0.4 MG capsule   Commonly known as:  FLOMAX   Used for:  Urinary retention        Dose:  0.4 mg   Take 1 capsule (0.4 mg) by mouth daily   Quantity:  30 capsule   Refills:  11         STOP taking     cephALEXin 500 MG capsule   Commonly known as:  KEFLEX                Where to get your medicines      These medications were sent to [facility] [place], [place] - [address] [address]     Phone:  [phone]     furosemide 20 MG tablet    levothyroxine 175 MCG tablet    lisinopril 2.5 MG tablet    potassium chloride SA 20 MEQ CR tablet    sulfamethoxazole-trimethoprim 800-160 MG per tablet    warfarin 10 MG tablet         Some of these will need a paper prescription and others can be bought over the counter. Ask your nurse if you have questions.     Bring a paper prescription for each of these medications     lactobacillus rhamnosus (GG) capsule               ANTIBIOTIC INSTRUCTION     You've Been Prescribed an Antibiotic - Now What?  Your healthcare team thinks that you or your loved one might have an infection. Some infections can be treated with antibiotics, which are powerful, life-saving drugs. Like all medications, antibiotics have side effects and should only be used when necessary. There are some important things you should know about your antibiotic treatment.      Your healthcare team may run tests before you start taking an antibiotic.    Your team may take samples (e.g., from your blood, urine or other areas) to run tests to look for bacteria. These test can be important to determine if you need an antibiotic at all and, if you do, which antibiotic will work best.      Within a few days, your healthcare team might change or even stop your antibiotic.    Your team may start you on an  antibiotic while they are working to find out what is making you sick.    Your team might change your antibiotic because test results show that a different antibiotic would be better to treat your infection.    In some cases, once your team has more information, they learn that you do not need an antibiotic at all. They may find out that you don't have an infection, or that the antibiotic you're taking won't work against your infection. For example, an infection caused by a virus can't be treated with antibiotics. Staying on an antibiotic when you don't need it is more likely to be harmful than helpful.      You may experience side effects from your antibiotic.    Like all medications, antibiotics have side effects. Some of these can be serious.    Let you healthcare team know if you have any known allergies when you are admitted to the hospital.    One significant side effect of nearly all antibiotics is the risk of severe and sometimes deadly diarrhea caused by Clostridium difficile (C. Difficile). This occurs when a person takes antibiotics because some good germs are destroyed. Antibiotic use allows C. diificile to take over, putting patients at high risk for this serious infection.    As a patient or caregiver, it is important to understand your or your loved one's antibiotic treatment. It is especially important for caregivers to speak up when patients can't speak for themselves. Here are some important questions to ask your healthcare team.    What infection is this antibiotic treating and how do you know I have that infection?    What side effects might occur from this antibiotic?    How long will I need to take this antibiotic?    Is it safe to take this antibiotic with other medications or supplements (e.g., vitamins) that I am taking?     Are there any special directions I need to know about taking this antibiotic? For example, should I take it with food?    How will I be monitored to know whether my  infection is responding to the antibiotic?    What tests may help to make sure the right antibiotic is prescribed for me?      Information provided by:  www.cdc.gov/getsmart  U.S. Department of Health and Human Services  Centers for disease Control and Prevention  National Center for Emerging and Zoonotic Infectious Diseases  Division of Healthcare Quality Promotion         Protect others around you: Learn how to safely use, store and throw away your medicines at www.disposemymeds.org.             Medication List: This is a list of all your medications and when to take them. Check marks below indicate your daily home schedule. Keep this list as a reference.      Medications           Morning Afternoon Evening Bedtime As Needed    aspirin 81 MG tablet   Take 1 tablet (81 mg) by mouth daily                                atorvastatin 40 MG tablet   Commonly known as:  LIPITOR   Take 1 tablet (40 mg) by mouth daily   Last time this was given:  40 mg on 1/10/2018  8:12 AM                                furosemide 20 MG tablet   Commonly known as:  LASIX   Take 3 tablets (60 mg) by mouth 2 times daily   Last time this was given:  60 mg on 1/10/2018  8:12 AM                                ipratropium - albuterol 0.5 mg/2.5 mg/3 mL 0.5-2.5 (3) MG/3ML neb solution   Commonly known as:  DUONEB   Take 1 vial (3 mLs) by nebulization every 4 hours as needed for wheezing                                lactobacillus rhamnosus (GG) capsule   Take 1 capsule by mouth 2 times daily   Last time this was given:  1 capsule on 1/10/2018  8:12 AM                                levothyroxine 175 MCG tablet   Commonly known as:  SYNTHROID/LEVOTHROID   Take 1 tablet (175 mcg) by mouth daily   Last time this was given:  175 mcg on 1/10/2018  8:12 AM                                lisinopril 2.5 MG tablet   Commonly known as:  PRINIVIL/Zestril   Take 1 tablet (2.5 mg) by mouth 2 times daily   Last time this was given:  2.5 mg on 1/10/2018   8:12 AM                                metFORMIN 500 MG tablet   Commonly known as:  GLUCOPHAGE   Take 1 tablet (500 mg) by mouth 2 times daily (with meals)                                metoprolol 100 MG 24 hr tablet   Commonly known as:  TOPROL-XL   Take 1 tablet (100 mg) by mouth daily   Last time this was given:  100 mg on 1/9/2018  8:45 PM                                multivitamin, therapeutic with minerals Tabs tablet   Take 1 tablet by mouth daily   Last time this was given:  1 tablet on 1/10/2018  8:12 AM                                omega 3 1000 MG Caps   Take 1 g by mouth daily   Last time this was given:  1 g on 1/10/2018  8:12 AM                                order for DME   Equipment being ordered: Other: Velcro Compression stockings.  Treatment Diagnosis: bilateral lymphedema.                                polyethylene glycol Packet   Commonly known as:  MIRALAX/GLYCOLAX   Take 17 g by mouth daily as needed for constipation                                * potassium chloride SA 20 MEQ CR tablet   Commonly known as:  K-DUR/KLOR-CON M   Take 2 tablets (40 mEq) by mouth daily   Last time this was given:  40 mEq on 1/10/2018  8:12 AM                                * potassium chloride SA 20 MEQ CR tablet   Commonly known as:  K-DUR/KLOR-CON M   Take 2 tablets (40 mEq) by mouth daily   Last time this was given:  40 mEq on 1/10/2018  8:12 AM                                senna-docusate 8.6-50 MG per tablet   Commonly known as:  SENOKOT-S;PERICOLACE   Take 1-2 tablets by mouth 2 times daily as needed for constipation   Last time this was given:  2 tablets on 1/9/2018 10:04 AM                                spironolactone 25 MG tablet   Commonly known as:  ALDACTONE   Take 1 tablet (25 mg) by mouth 2 times daily   Last time this was given:  25 mg on 1/10/2018  8:12 AM                                sulfamethoxazole-trimethoprim 800-160 MG per tablet   Commonly known as:  BACTRIM DS/SEPTRA DS   Take 1  tablet by mouth 2 times daily for 1 dose   Last time this was given:  1 tablet on 1/10/2018 10:46 AM                                tamsulosin 0.4 MG capsule   Commonly known as:  FLOMAX   Take 1 capsule (0.4 mg) by mouth daily   Last time this was given:  0.4 mg on 1/9/2018  8:46 PM                                * warfarin 5 MG tablet   Commonly known as:  COUMADIN   Take 2.5 tablets (12.5 mg) by mouth daily   Last time this was given:  12.5 mg on 1/9/2018  6:09 PM                                * warfarin 10 MG tablet   Commonly known as:  COUMADIN   Take 1 tablet (10 mg) by mouth once for 1 dose   Last time this was given:  12.5 mg on 1/9/2018  6:09 PM                                * Notice:  This list has 4 medication(s) that are the same as other medications prescribed for you. Read the directions carefully, and ask your doctor or other care provider to review them with you.

## 2018-01-05 NOTE — ED PROVIDER NOTES
History     Chief Complaint   Patient presents with     Cellulitis     HPI  Clarke Moreira is a 67 year old male with a history of MI x2, morbid obesity, lymphedema and basal cell carcinoma who presents to the Emergency Department today for evaluation of cellulitis. The patient states that he has been having his symptoms since last week. He notes that his at home nurse wanted him to come in and get evaluated for potential cellulitis since he has been having bilateral lower leg redness, swelling with blisters and drainage. The patient states that this has all been new within the last week. His at home nurse typically wraps his legs 2-3 times a week and the last time he had them wrapped was yesterday. He does note that he had cellulitis a few weeks ago and was treated and it resolved completely. This time, he says that his symptoms are much worse and that he never had blistering and drainage last time. He had been taking lasix and a few other medications but he has not been taking them for about 10 days as he has run out of his prescriptions. The patient also is concerned about possible blood clots in his legs as his feet are more pale and he has been having numbness on the tops of his feet. He denies a history of blood clots in his legs. The patient denies fever, chills, sweats, nausea, vomiting, or diarrhea.     I have reviewed the Medications, Allergies, Past Medical and Surgical History, and Social History in the [facility] system.    Past Medical History:   Diagnosis Date     Basal cell carcinoma        Past Surgical History:   Procedure Laterality Date     BIOPSY OF SKIN LESION       MOHS MICROGRAPHIC PROCEDURE       PICC INSERTION Right 09/24/2017    5fr TL Bard PICC, 51cm (1cm external) in the R medial brachial vein w/ tip in the SVC.       Family History   Problem Relation Age of Onset     Depression Mother      CANCER No family hx of      No family history of skin cancer       Social History   Substance Use  Topics     Smoking status: Never Smoker     Smokeless tobacco: Never Used     Alcohol use No      Comment: Former alcohol abuse. Quit 70s then quit later in 80s-present       No current facility-administered medications for this encounter.      Current Outpatient Prescriptions   Medication     cephALEXin (KEFLEX) 500 MG capsule     spironolactone (ALDACTONE) 25 MG tablet     warfarin (COUMADIN) 5 MG tablet     furosemide (LASIX) 20 MG tablet     potassium chloride SA (K-DUR/KLOR-CON M) 20 MEQ CR tablet     levothyroxine (SYNTHROID/LEVOTHROID) 150 MCG tablet     lisinopril (PRINIVIL/ZESTRIL) 5 MG tablet     metFORMIN (GLUCOPHAGE) 500 MG tablet     metoprolol (TOPROL-XL) 100 MG 24 hr tablet     multivitamin, therapeutic with minerals (MULTI-VITAMIN) TABS tablet     omega 3 1000 MG CAPS     polyethylene glycol (MIRALAX/GLYCOLAX) Packet     tamsulosin (FLOMAX) 0.4 MG capsule     aspirin 81 MG tablet     atorvastatin (LIPITOR) 40 MG tablet     senna-docusate (SENOKOT-S;PERICOLACE) 8.6-50 MG per tablet     order for DME     ipratropium - albuterol 0.5 mg/2.5 mg/3 mL (DUONEB) 0.5-2.5 (3) MG/3ML neb solution      No Known Allergies     Review of Systems   Constitutional: Negative for chills, diaphoresis and fever.   HENT: Negative for congestion.    Eyes: Negative for redness.   Respiratory: Negative for cough and shortness of breath.    Cardiovascular: Positive for leg swelling (Bilaterally ). Negative for chest pain.   Gastrointestinal: Negative for abdominal pain, diarrhea, nausea and vomiting.   Genitourinary: Negative for difficulty urinating.   Musculoskeletal: Negative for arthralgias and neck stiffness.   Skin: Positive for color change (Bilateral lower leg redness) and wound (Open blisters on lower legs bilaterally).   Neurological: Positive for numbness (Top of feet). Negative for headaches.   Psychiatric/Behavioral: Negative for confusion.   All other systems reviewed and are negative.      Physical Exam   BP:  139/88  Heart Rate: 91  Temp: 97.5  F (36.4  C)  Weight: (!) 198.5 kg (437 lb 9.8 oz)  SpO2: 98 %      Physical Exam   Constitutional: No distress.   Morbidly obese   HENT:   Head: Atraumatic.   Mouth/Throat: Oropharynx is clear and moist. No oropharyngeal exudate.   Eyes: Pupils are equal, round, and reactive to light. No scleral icterus.   Cardiovascular: Normal heart sounds and intact distal pulses.    Pulmonary/Chest: Breath sounds normal. No respiratory distress.   Abdominal: Soft. Bowel sounds are normal. There is no tenderness.   Musculoskeletal: He exhibits no edema or tenderness.   Skin: Skin is warm. Rash ( As noted in pictures below) noted. He is not diaphoretic.               ED Course   10:59 AM  The patient was seen and examined by Dr. Moody in Room 03.    ED Course     Procedures             EKG Interpretation:      Interpreted by Pop Moody  Time reviewed: 2:02 PM  Symptoms at time of EKG: Leg swelling, dyspnea  Rhythm: atrial fibrillation - controlled  Rate: Normal  Axis: Normal  Ectopy: premature ventricular contractions (unifocal)  Conduction: normal  ST Segments/ T Waves: No ST-T wave changes  Q Waves: none  Comparison to prior: Compared with EKG from September 24, 2017 ventricular response is controlled.  As well QT prolongation is now observed.    Clinical Impression: atrial fibrillation (chronic)            Results for orders placed or performed during the hospital encounter of 01/05/18 (from the past 24 hour(s))   CBC with platelets differential   Result Value Ref Range    WBC 6.5 4.0 - 11.0 10e9/L    RBC Count 4.45 4.4 - 5.9 10e12/L    Hemoglobin 13.4 13.3 - 17.7 g/dL    Hematocrit 42.5 40.0 - 53.0 %    MCV 96 78 - 100 fl    MCH 30.1 26.5 - 33.0 pg    MCHC 31.5 31.5 - 36.5 g/dL    RDW 15.8 (H) 10.0 - 15.0 %    Platelet Count 182 150 - 450 10e9/L    Diff Method Automated Method     % Neutrophils 57.4 %    % Lymphocytes 28.0 %    % Monocytes 10.2 %    % Eosinophils 3.6 %    % Basophils 0.6  %    % Immature Granulocytes 0.2 %    Nucleated RBCs 0 0 /100    Absolute Neutrophil 3.7 1.6 - 8.3 10e9/L    Absolute Lymphocytes 1.8 0.8 - 5.3 10e9/L    Absolute Monocytes 0.7 0.0 - 1.3 10e9/L    Absolute Eosinophils 0.2 0.0 - 0.7 10e9/L    Absolute Basophils 0.0 0.0 - 0.2 10e9/L    Abs Immature Granulocytes 0.0 0 - 0.4 10e9/L    Absolute Nucleated RBC 0.0    Comprehensive metabolic panel   Result Value Ref Range    Sodium 140 133 - 144 mmol/L    Potassium 3.3 (L) 3.4 - 5.3 mmol/L    Chloride 101 94 - 109 mmol/L    Carbon Dioxide 32 20 - 32 mmol/L    Anion Gap 6 3 - 14 mmol/L    Glucose 103 (H) 70 - 99 mg/dL    Urea Nitrogen 10 7 - 30 mg/dL    Creatinine 0.84 0.66 - 1.25 mg/dL    GFR Estimate >90 >60 mL/min/1.7m2    GFR Estimate If Black >90 >60 mL/min/1.7m2    Calcium 9.4 8.5 - 10.1 mg/dL    Bilirubin Total 1.1 0.2 - 1.3 mg/dL    Albumin 3.1 (L) 3.4 - 5.0 g/dL    Protein Total 7.6 6.8 - 8.8 g/dL    Alkaline Phosphatase 87 40 - 150 U/L    ALT 16 0 - 70 U/L    AST 16 0 - 45 U/L   Lactic acid   Result Value Ref Range    Lactic Acid 1.1 0.7 - 2.0 mmol/L   CRP inflammation   Result Value Ref Range    CRP Inflammation 6.2 0.0 - 8.0 mg/L   INR   Result Value Ref Range    INR 2.01 (H) 0.86 - 1.14   Magnesium   Result Value Ref Range    Magnesium 2.0 1.6 - 2.3 mg/dL   EKG 12 lead   Result Value Ref Range    Interpretation ECG Click View Image link to view waveform and result    Chest  XR, 1 view portable    Narrative    XR CHEST PORT 1 VW  1/5/2018 2:10 PM      HISTORY: Dyspnea;     COMPARISON: 10/4/2017    FINDINGS: Portable AP view of the chest. Hazy right basilar opacities.  No pneumothorax or or pleural effusion. Dilated pulmonary arteries.  Heart size is unchanged.      Impression    IMPRESSION: Hazy right basilar atelectasis, infection or aspiration.  Pulmonary vascular congestion.    I have personally reviewed the examination and initial interpretation  and I agree with the findings.    SOL CRAIG MD      Medications   furosemide (LASIX) injection 40 mg (40 mg Intravenous Given 1/5/18 1423)   potassium chloride (KLOR-CON) Packet 40 mEq (40 mEq Oral Given 1/5/18 1422)           Critical Care time:  none           Labs Ordered and Resulted from Time of ED Arrival Up to the Time of Departure from the ED   CBC WITH PLATELETS DIFFERENTIAL - Abnormal; Notable for the following:        Result Value    RDW 15.8 (*)     All other components within normal limits   COMPREHENSIVE METABOLIC PANEL - Abnormal; Notable for the following:     Potassium 3.3 (*)     Glucose 103 (*)     Albumin 3.1 (*)     All other components within normal limits   INR - Abnormal; Notable for the following:     INR 2.01 (*)     All other components within normal limits   LACTIC ACID WHOLE BLOOD   CRP INFLAMMATION   MAGNESIUM            Assessments & Plan (with Medical Decision Making)   67-year-old male presents for evaluation of increased swelling and new redness in bilateral legs.  Differential included DVT, cellulitis, chronic stasis, lymphedema changes, other.  Exam revealed bilaterally symmetrical quite edematous legs with stable vital signs.  Laboratories including CBC and comprehensive metabolic panel were normal with exception of minimally low potassium and slightly elevated glucose.  Lactic acid, CRP were normal.  INR was therapeutic at 2.1.  Magnesium was 2.0.  Chest x-ray revealed slight increase in probable pulmonary vascular congestion.  The patient was treated with Lasix 40 mg IV and 40 mEq of potassium.  Given that the patient is anticoagulated with lymphedema and chronic stasis dermatitis changes, it was felt that bilateral spontaneous DVTs were quite unlikely.  As well given normal inflammatory markers and bilateral nature of findings, it was felt that cellulitis was less likely than more chronic dermatitis changes.  The case was discussed at length with both the cardiologist Dr. Willoughby fluid seen the patient on December 26 as  well as family practice medicine.  It was felt that the patient would benefit from admission for IV diuretics, monitoring leg changes for progression and consultation by cardiology for consideration of right heart catheterization.    I have reviewed the nursing notes.    I have reviewed the findings, diagnosis, plan and need for follow up with the patient.    Current Discharge Medication List          Final diagnoses:   Cellulitis of right upper extremity   Cellulitis of left upper extremity   Chronic systolic congestive heart failure (H)   Hypervolemia, unspecified hypervolemia type   Stasis dermatitis of both legs   I, Ryan Ly, am serving as a trained medical scribe to document services personally performed by Darnell Moody MD, based on the provider's statements to me.   I, Darnell Moody MD, was physically present and have reviewed and verified the accuracy of this note documented by Ryan Ly.     1/5/2018   [facility], [place], EMERGENCY DEPARTMENT     Pop Moody MD  01/05/18 1525       Pop Moody MD  01/05/18 1526       Pop Moody MD  01/05/18 1527

## 2018-01-05 NOTE — ED NOTES
Pt BIBA from home with reports of bilateral leg pain/ possible cellulitis. Pt states he stopped taking lasix 10 days ago due to running out of prescription. Legs red bilaterally with swelling and blisters. Pt also reports numbness and loss of color bilaterally in feet. Pt is concerned of blood clot. No neuro deficits noted at this time. Alert, oriented, Denies chest pain/SOB

## 2018-01-05 NOTE — TELEPHONE ENCOUNTER
Keflex 500 TID x 14 days sent to pharmacy  Please contact Stanley Blount to have her start meds darrell Santiago

## 2018-01-05 NOTE — IP AVS SNAPSHOT
[address] [address]    Phone:  [phone]                                       After Visit Summary   1/5/2018    Clarke Moreira    MRN: [id number]           After Visit Summary Signature Page     I have received my discharge instructions, and my questions have been answered. I have discussed any challenges I see with this plan with the nurse or doctor.    ..........................................................................................................................................  Patient/Patient Representative Signature      ..........................................................................................................................................  Patient Representative Print Name and Relationship to Patient    ..................................................               ................................................  Date                                            Time    ..........................................................................................................................................  Reviewed by Signature/Title    ...................................................              ..............................................  Date                                                            Time

## 2018-01-06 ENCOUNTER — APPOINTMENT (OUTPATIENT)
Dept: ULTRASOUND IMAGING | Facility: CLINIC | Age: 68
DRG: 292 | End: 2018-01-06
Attending: EMERGENCY MEDICINE
Payer: COMMERCIAL

## 2018-01-06 LAB
ANION GAP SERPL CALCULATED.3IONS-SCNC: 10 MMOL/L (ref 3–14)
ANION GAP SERPL CALCULATED.3IONS-SCNC: 7 MMOL/L (ref 3–14)
ANION GAP SERPL CALCULATED.3IONS-SCNC: 9 MMOL/L (ref 3–14)
BUN SERPL-MCNC: 8 MG/DL (ref 7–30)
BUN SERPL-MCNC: 9 MG/DL (ref 7–30)
BUN SERPL-MCNC: 9 MG/DL (ref 7–30)
CALCIUM SERPL-MCNC: 8.8 MG/DL (ref 8.5–10.1)
CALCIUM SERPL-MCNC: 8.9 MG/DL (ref 8.5–10.1)
CALCIUM SERPL-MCNC: 9.1 MG/DL (ref 8.5–10.1)
CHLORIDE SERPL-SCNC: 101 MMOL/L (ref 94–109)
CHLORIDE SERPL-SCNC: 102 MMOL/L (ref 94–109)
CHLORIDE SERPL-SCNC: 96 MMOL/L (ref 94–109)
CO2 SERPL-SCNC: 28 MMOL/L (ref 20–32)
CO2 SERPL-SCNC: 32 MMOL/L (ref 20–32)
CO2 SERPL-SCNC: 32 MMOL/L (ref 20–32)
CREAT SERPL-MCNC: 0.79 MG/DL (ref 0.66–1.25)
CREAT SERPL-MCNC: 0.79 MG/DL (ref 0.66–1.25)
CREAT SERPL-MCNC: 0.82 MG/DL (ref 0.66–1.25)
CRP SERPL-MCNC: 8.9 MG/L (ref 0–8)
ERYTHROCYTE [DISTWIDTH] IN BLOOD BY AUTOMATED COUNT: 15.7 % (ref 10–15)
GFR SERPL CREATININE-BSD FRML MDRD: >90 ML/MIN/1.7M2
GLUCOSE BLDC GLUCOMTR-MCNC: 103 MG/DL (ref 70–99)
GLUCOSE BLDC GLUCOMTR-MCNC: 107 MG/DL (ref 70–99)
GLUCOSE BLDC GLUCOMTR-MCNC: 113 MG/DL (ref 70–99)
GLUCOSE BLDC GLUCOMTR-MCNC: 127 MG/DL (ref 70–99)
GLUCOSE BLDC GLUCOMTR-MCNC: 133 MG/DL (ref 70–99)
GLUCOSE SERPL-MCNC: 110 MG/DL (ref 70–99)
GLUCOSE SERPL-MCNC: 96 MG/DL (ref 70–99)
GLUCOSE SERPL-MCNC: 97 MG/DL (ref 70–99)
HCT VFR BLD AUTO: 40.8 % (ref 40–53)
HGB BLD-MCNC: 12.7 G/DL (ref 13.3–17.7)
INR PPP: 1.92 (ref 0.86–1.14)
MAGNESIUM SERPL-MCNC: 2 MG/DL (ref 1.6–2.3)
MAGNESIUM SERPL-MCNC: 2.1 MG/DL (ref 1.6–2.3)
MCH RBC QN AUTO: 29.5 PG (ref 26.5–33)
MCHC RBC AUTO-ENTMCNC: 31.1 G/DL (ref 31.5–36.5)
MCV RBC AUTO: 95 FL (ref 78–100)
PHOSPHATE SERPL-MCNC: 4 MG/DL (ref 2.5–4.5)
PHOSPHATE SERPL-MCNC: 4.6 MG/DL (ref 2.5–4.5)
PLATELET # BLD AUTO: 202 10E9/L (ref 150–450)
POTASSIUM SERPL-SCNC: 3.2 MMOL/L (ref 3.4–5.3)
POTASSIUM SERPL-SCNC: 3.4 MMOL/L (ref 3.4–5.3)
POTASSIUM SERPL-SCNC: 3.4 MMOL/L (ref 3.4–5.3)
RBC # BLD AUTO: 4.31 10E12/L (ref 4.4–5.9)
SODIUM SERPL-SCNC: 137 MMOL/L (ref 133–144)
SODIUM SERPL-SCNC: 139 MMOL/L (ref 133–144)
SODIUM SERPL-SCNC: 140 MMOL/L (ref 133–144)
TSH SERPL DL<=0.005 MIU/L-ACNC: 6.8 MU/L (ref 0.4–4)
WBC # BLD AUTO: 7.2 10E9/L (ref 4–11)

## 2018-01-06 PROCEDURE — 84443 ASSAY THYROID STIM HORMONE: CPT | Performed by: FAMILY MEDICINE

## 2018-01-06 PROCEDURE — 25000128 H RX IP 250 OP 636: Performed by: NURSE PRACTITIONER

## 2018-01-06 PROCEDURE — 83735 ASSAY OF MAGNESIUM: CPT | Performed by: FAMILY MEDICINE

## 2018-01-06 PROCEDURE — 25000132 ZZH RX MED GY IP 250 OP 250 PS 637: Performed by: FAMILY MEDICINE

## 2018-01-06 PROCEDURE — 25000132 ZZH RX MED GY IP 250 OP 250 PS 637: Performed by: STUDENT IN AN ORGANIZED HEALTH CARE EDUCATION/TRAINING PROGRAM

## 2018-01-06 PROCEDURE — 93005 ELECTROCARDIOGRAM TRACING: CPT

## 2018-01-06 PROCEDURE — 25000125 ZZHC RX 250: Performed by: NURSE PRACTITIONER

## 2018-01-06 PROCEDURE — 25000128 H RX IP 250 OP 636: Performed by: FAMILY MEDICINE

## 2018-01-06 PROCEDURE — 93970 EXTREMITY STUDY: CPT

## 2018-01-06 PROCEDURE — 99232 SBSQ HOSP IP/OBS MODERATE 35: CPT | Performed by: NURSE PRACTITIONER

## 2018-01-06 PROCEDURE — 84100 ASSAY OF PHOSPHORUS: CPT | Performed by: FAMILY MEDICINE

## 2018-01-06 PROCEDURE — 80048 BASIC METABOLIC PNL TOTAL CA: CPT | Performed by: FAMILY MEDICINE

## 2018-01-06 PROCEDURE — 36415 COLL VENOUS BLD VENIPUNCTURE: CPT | Performed by: FAMILY MEDICINE

## 2018-01-06 PROCEDURE — 40000275 ZZH STATISTIC RCP TIME EA 10 MIN

## 2018-01-06 PROCEDURE — 85610 PROTHROMBIN TIME: CPT | Performed by: FAMILY MEDICINE

## 2018-01-06 PROCEDURE — 00000146 ZZHCL STATISTIC GLUCOSE BY METER IP

## 2018-01-06 PROCEDURE — 12000001 ZZH R&B MED SURG/OB UMMC

## 2018-01-06 PROCEDURE — 94660 CPAP INITIATION&MGMT: CPT

## 2018-01-06 PROCEDURE — 25000132 ZZH RX MED GY IP 250 OP 250 PS 637: Performed by: NURSE PRACTITIONER

## 2018-01-06 PROCEDURE — 86140 C-REACTIVE PROTEIN: CPT | Performed by: FAMILY MEDICINE

## 2018-01-06 PROCEDURE — 25000125 ZZHC RX 250: Performed by: STUDENT IN AN ORGANIZED HEALTH CARE EDUCATION/TRAINING PROGRAM

## 2018-01-06 PROCEDURE — 25000128 H RX IP 250 OP 636: Performed by: STUDENT IN AN ORGANIZED HEALTH CARE EDUCATION/TRAINING PROGRAM

## 2018-01-06 PROCEDURE — 93010 ELECTROCARDIOGRAM REPORT: CPT | Performed by: INTERNAL MEDICINE

## 2018-01-06 PROCEDURE — 40000141 ZZH STATISTIC PERIPHERAL IV START W/O US GUIDANCE

## 2018-01-06 PROCEDURE — 36415 COLL VENOUS BLD VENIPUNCTURE: CPT | Performed by: STUDENT IN AN ORGANIZED HEALTH CARE EDUCATION/TRAINING PROGRAM

## 2018-01-06 PROCEDURE — 80048 BASIC METABOLIC PNL TOTAL CA: CPT | Performed by: STUDENT IN AN ORGANIZED HEALTH CARE EDUCATION/TRAINING PROGRAM

## 2018-01-06 PROCEDURE — 85027 COMPLETE CBC AUTOMATED: CPT | Performed by: FAMILY MEDICINE

## 2018-01-06 RX ORDER — LISINOPRIL 2.5 MG/1
2.5 TABLET ORAL 2 TIMES DAILY
Status: DISCONTINUED | OUTPATIENT
Start: 2018-01-06 | End: 2018-01-08

## 2018-01-06 RX ORDER — POTASSIUM CHLORIDE 750 MG/1
40 TABLET, EXTENDED RELEASE ORAL DAILY
Status: DISCONTINUED | OUTPATIENT
Start: 2018-01-07 | End: 2018-01-07

## 2018-01-06 RX ORDER — WARFARIN SODIUM 7.5 MG/1
15 TABLET ORAL
Status: COMPLETED | OUTPATIENT
Start: 2018-01-06 | End: 2018-01-06

## 2018-01-06 RX ORDER — FUROSEMIDE 10 MG/ML
40 INJECTION INTRAMUSCULAR; INTRAVENOUS ONCE
Status: COMPLETED | OUTPATIENT
Start: 2018-01-06 | End: 2018-01-06

## 2018-01-06 RX ORDER — POTASSIUM CHLORIDE 1.5 G/1.58G
40 POWDER, FOR SOLUTION ORAL ONCE
Status: COMPLETED | OUTPATIENT
Start: 2018-01-06 | End: 2018-01-06

## 2018-01-06 RX ORDER — METOPROLOL TARTRATE 1 MG/ML
5 INJECTION, SOLUTION INTRAVENOUS ONCE
Status: COMPLETED | OUTPATIENT
Start: 2018-01-06 | End: 2018-01-06

## 2018-01-06 RX ADMIN — POTASSIUM CHLORIDE 40 MEQ: 1.5 POWDER, FOR SOLUTION ORAL at 04:04

## 2018-01-06 RX ADMIN — SPIRONOLACTONE 25 MG: 25 TABLET ORAL at 19:44

## 2018-01-06 RX ADMIN — Medication 20 MG/HR: at 14:08

## 2018-01-06 RX ADMIN — FUROSEMIDE 40 MG: 10 INJECTION, SOLUTION INTRAVENOUS at 03:51

## 2018-01-06 RX ADMIN — Medication 1 CAPSULE: at 07:51

## 2018-01-06 RX ADMIN — SULFAMETHOXAZOLE AND TRIMETHOPRIM 1 TABLET: 800; 160 TABLET ORAL at 11:00

## 2018-01-06 RX ADMIN — FUROSEMIDE 40 MG: 10 INJECTION, SOLUTION INTRAVENOUS at 11:00

## 2018-01-06 RX ADMIN — MULTIPLE VITAMINS W/ MINERALS TAB 1 TABLET: TAB at 07:51

## 2018-01-06 RX ADMIN — ASPIRIN 81 MG: 81 TABLET, COATED ORAL at 07:51

## 2018-01-06 RX ADMIN — Medication 1 CAPSULE: at 19:43

## 2018-01-06 RX ADMIN — ATORVASTATIN CALCIUM 40 MG: 40 TABLET, FILM COATED ORAL at 07:51

## 2018-01-06 RX ADMIN — METOPROLOL SUCCINATE 100 MG: 50 TABLET, EXTENDED RELEASE ORAL at 19:47

## 2018-01-06 RX ADMIN — Medication 1 G: at 07:51

## 2018-01-06 RX ADMIN — TAMSULOSIN HYDROCHLORIDE 0.4 MG: 0.4 CAPSULE ORAL at 19:44

## 2018-01-06 RX ADMIN — WARFARIN SODIUM 15 MG: 7.5 TABLET ORAL at 17:55

## 2018-01-06 RX ADMIN — FUROSEMIDE 40 MG: 10 INJECTION, SOLUTION INTRAVENOUS at 07:50

## 2018-01-06 RX ADMIN — LISINOPRIL 2.5 MG: 2.5 TABLET ORAL at 19:47

## 2018-01-06 RX ADMIN — LEVOTHYROXINE SODIUM 150 MCG: 75 TABLET ORAL at 07:51

## 2018-01-06 RX ADMIN — SULFAMETHOXAZOLE AND TRIMETHOPRIM 1 TABLET: 800; 160 TABLET ORAL at 19:43

## 2018-01-06 RX ADMIN — SPIRONOLACTONE 25 MG: 25 TABLET ORAL at 07:50

## 2018-01-06 RX ADMIN — LISINOPRIL 2.5 MG: 2.5 TABLET ORAL at 07:50

## 2018-01-06 RX ADMIN — POTASSIUM CHLORIDE 40 MEQ: 1.5 POWDER, FOR SOLUTION ORAL at 19:41

## 2018-01-06 RX ADMIN — METOPROLOL TARTRATE 5 MG: 5 INJECTION INTRAVENOUS at 06:03

## 2018-01-06 NOTE — CONSULTS
[facility]   Cardiology II Service / Advanced Heart Failure  Daily Progress Note  Date of Service: 1/6/2018      Patient: Clarke Moreira  MRN: [id number]  Admission Date: 1/5/2018  Hospital Day # 1    Assessment and Plan: Clarke Moreira is a 67 year old male with a past medical history of systolic heart failure, CAD, ICM (Estimated EF of 40-45%), venous stasis, afib, DM II, PAD, morbid obesity, and depression who was admitted for concerns of lower extremity edema cellulitis.  His is grossly volume overloaded today on exam.  Weight today is 437 lbs.  Typical dry weight is 415-420.  Would recommend starting lasix gtt and diurese until he becomes euvolemic and hits his dry weight and then transition to back to oral lasix, or could consider bumex. Increase lisinopril as tolerated for afterload reduction.  Would not increase metoprolol until he is euvolemic.  Would recommend considering RHC once euvolemic.  Follow up in [facility] in a week upon discharge.     Plan:   1.  Start lasix gtt today 20 mg/hr with 40 mg IV lasix bolus.  2.  Increase Lisinopril to 2.5 mg twice daily.   3.  Start potassium 40 meq daily.  4.  BMP twice daily while on IV lasix  5.  Daily weights.  6.  Change diet to 1500 FR and <2 gm sodium.   7.  Obtain [facility] for care everywhere to obtain prior angiogram reports.     Chronic systolic heart failure secondary to ICM, obesity hypoventilation syndrome, possibly ischemic, ?dietary indiscretion with the holidays   Stage C  NYHA Class IIIB  ACEi/ARB: yes, Increase Lisinopril 2.5 mg twice daily. Titrate as tolerated for afterload reduction.  BB: yes, Toprol  mg daily.   Aldosterone antagonist: yes, spironolactone 25 mg BID  SCD prophylaxis: does not meet criteria for implant  Fluid status: Hypervolemic.  Start lasix gtt as outlined above. RHC once more euvolemic.  Anticoagulation: Warfarin INR goal 2-3.  INR 1.92  Antiplatelet:  ASA dose  81 mg daily  Sleep apnea: yes,  CPAP  NSAID use:  Contraindicated.  Avoid use.  HTN:  Controlled.  Taking Metoprolol and lisinopril.  Increase lisinopril as outlined above for afterload reduction.      CAD:  Previous documented history per record review but unable to find more specifics on record review.  Obtain NIMA as outlined above.   -Stable.  No chest pain  -Continue Statin, beta blocker, ASA, spironolactone, and ACE inhibitor.    Atrial Fibrillation:  Chads Vasc score: 5.  EKG obtained today showed Afib with rates in the 100's.    -Taking warfarin with INR goal of 2-3.    -Continue Metoprolol  mg daily. Avoid further titration for now as he is acutely decompensated. Diurese as outlined above.  Monitor HR for now.    -Continue with CPAP    ================================================================    Interval History/ROS: Clarke had trouble breathing this am while lying in bed. +SAWANT with minimal exertion.  States he is uncomfortable and is having a harder time breathing secondary to abdominal distension.   Sleeps upright which is his norm.  Lower extremities are quite edematous and he bumped his shin causing an open wound on his left leg.  Legs are weeping.  Denies chest pain, palpitations, lightheadedness, dizziness, near syncopal/syncopal episodes.        Last 24 hr care team notes reviewed.   ROS:  4 point ROS including Respiratory, CV, GI and , other than that noted in the HPI, is negative.     Medications: Reviewed in EPIC.     Physical Exam:   /67 (BP Location: Right arm)  Pulse 94  Temp 97.8  F (36.6  C) (Oral)  Resp 18  Wt (!) 198.5 kg (437 lb 9.8 oz)  SpO2 96%  BMI 59.35 kg/m2  GENERAL: Appears alert and oriented times three, morbidly obese. Sitting in bed.   HEENT: EOMI. Mucous membranes moist and without lesions.  NECK: Supple and without lymphadenopathy. JVD mid neck.    CV: Irregular, S1 with split S2 present without murmur, rub, or gallop.   RESPIRATORY: Respirations regular, even, and slightly labored.   Crackles heard right base.  No ronchi or wheezing.    GI: Firm and distended with normoactive bowel sounds present in all quadrants. No tenderness, rebound, guarding. Hepatomegaly difficult to assess secondary to body habitus.   EXTREMITIES: No peripheral edema. 2+ bilateral pedal pulses.   NEUROLOGIC: Alert and orientated x 3. CN II-XII grossly intact. No focal deficits.   MUSCULOSKELETAL: No joint swelling or tenderness.   SKIN: Wound noted on anterior portion of left leg approximately 1 inch or so in diameter.  Legs weeping bilaterally and are josef with chronic stasis dermatitis secondary to chronic heart failure. 3+ pitting edema with tenderness with mild palpation.  No jaundice. No rashes or cyanosis.    Data:  CMP  Recent Labs  Lab 01/06/18  0052 01/05/18  1818 01/05/18  1109    140 140   POTASSIUM 3.4 3.7 3.3*   CHLORIDE 101 102 101   CO2 32 30 32   ANIONGAP 7 8 6   GLC 96 115* 103*   BUN 9 9 10   CR 0.79 0.78 0.84   GFRESTIMATED >90 >90 >90   GFRESTBLACK >90 >90 >90   CHERI 8.8 9.2 9.4   MAG  --  1.9 2.0   PHOS  --  3.7  --    PROTTOTAL  --   --  7.6   ALBUMIN  --   --  3.1*   BILITOTAL  --   --  1.1   ALKPHOS  --   --  87   AST  --   --  16   ALT  --   --  16     CBC  Recent Labs  Lab 01/05/18  1109   WBC 6.5   RBC 4.45   HGB 13.4   HCT 42.5   MCV 96   MCH 30.1   MCHC 31.5   RDW 15.8*        INR  Recent Labs  Lab 01/05/18  1109   INR 2.01*         Patient seen and discussed with Dr. Linares.      Azeb Smith APRN, CNP  [phone]  Cards II Service  1/6/2018

## 2018-01-06 NOTE — PLAN OF CARE
Problem: Patient Care Overview  Goal: Plan of Care/Patient Progress Review  Outcome: No Change  Admitted for edema and cellulitis of BLE.  From home with [facility] nurse.  Up with SBA and walker.  VSS on RA except tachycardic.  Hx of a-fib, tele in place.  C/o pain/pressure in BLE, tolerable with relief of pressure.  IV lasix given as scheduled.  Pt using bedside urinal, good urine OP.  Good appetite.  BS checks before meals and bedtime.  BLE redness and blistering marked per ED.  Continue to monitor and follow POC.

## 2018-01-06 NOTE — PROGRESS NOTES
[facility] - Inpatient daily progress note    Date of admission: 1/5/2018  Date of service: 1/6/2018.            Assessment and Plan:   Assessment:   67 year old male with history of CHF R EF, chronic venous stasis, atrial fibrillation, type 2 diabetes, PVD, morbid obesity, and depression admitted for worsening lower extremity edema and volume overload with some concern for lower extremity cellulitis.  Patient Active Problem List   Diagnosis     Atrial fibrillation (H)     Hypothyroidism     Basal cell carcinoma of skin of face     CTS (carpal tunnel syndrome)     Myopia     Plantar fasciitis     Presbyopia     Regular astigmatism     Neoplasm of uncertain behavior of skin     Venous stasis     Type 2 diabetes mellitus with hyperglycemia, without long-term current use of insulin (H)     Morbid obesity (H)     Depression with anxiety     Fall from chair, initial encounter     Hyperlipidemia LDL goal <100     Fall     SOB (shortness of breath)     BiPAP (biphasic positive airway pressure) dependence     Lymphedema     Chronic systolic congestive heart failure (H)     Essential hypertension with goal blood pressure less than 140/90     Urinary retention     Hypokalemia     Constipation, unspecified constipation type     Cellulitis      Plan:    ## Concern for bilateral LE cellulitis:   ## Chronic Venous stasis with dermatitis:   Worsening erythema and bullae formation in the setting of chronic venous stasis. Patient is hemodynamically stable with unremarkable inflammatory and infectious markers. Given exam findings of erythema and warmth and history of resolution with abx we will treat.   - Bactrim 800-160 mg BID   - lactobacillus BID   - trend CBC and CRP   - Lymphedema/Physical and occupational therapy consults  - Bilateral Venous doppler ordered   - Continue to monitor vitals closely      ## Symptomatic HFrEF:   ## Pulmonary congestion:   Chronic dyspnea,  orthopnea, paroxysmal dyspnea and worsening edema in the setting of obesity, obesity hypoventilation syndrome, sleep apnea and heart failure. ECHO on 09/17 with normal EF, however ECHO in 2008 showed EF 40-45%. Cardiologist Dr. Willoughby saw patient. Recommendations appreciated. Chest xray on admission consistent with pulmonary congestion. About 20 pounds weight gain since 12/2017.  Per cardiology note the goal would be diuresis to 401 pounds.  For the past 24 hours he was -5 L.  BMP this morning was unremarkable.  The patient was seen by cardiology today who ordered a Lasix drip, increased dose of lisinopril, potassium, and change the diet to 1500 mL fluid restriction less than 2 g of sodium.  - cardiology consult, appreciate recs   - BMP and electrolytes BID   - Daily weights and strict I/O   - Continue to monitor vitals closely   - Continue CPAP with home settings   - Continue Metoprolol 100 mg daily, and Spironolactone 25 mg daily.     ## Atrial Fibrillation:   CHADS-VaSC 5.  Has been intermittently having heart rates as high as 140 over the past 12 hours.  These have not been sustained.  This was discussed with cardiology who recommended continuing diuresis and not addressing the heart rate at this time.  - Continue PTA Metoprolol 100 mg daily   - Pharmacy to dose Warfarin   - Daily INR   - Telemetry monitoring for questionable Colace [id number] weakness to    ## Obesity Hypoventilation syndrome:  ## Sleep Apnea:  - Continue diuresis per above   - Continue CPAP with home settings     ## Coronary Artery Disease:   ## PAD:   ## Essential Hypertension:   - Continue PTA Aspirin 81 mg daily   - Continue high intensity Lipitor 40 mg daily   - Continue PTA BP medications per above      ## Hypothyroidism:    TSH (11/2017) elevated at 4.89, repeat today was 6.8.   - Increase PTA Levothyroxine from 150 mcg daily to 175 mcg   - recheck TSH as outpatient in 6 weeks      ## DM-Type-2: Controlled   A1c (11/2017): 6.3  - Hold PTA  metformin 500 mg BID   - MSSI      ## BPH: Chronic, stable   - Continue PTA Flomax 0.4 mg daily      Daily cares -   F: PO   E: stable  N: Low fat diet   Lines: PIV,   Activity:-Up as tolerated  CODE:Full Code  Prophylaxis: Warfarin   PCP communication:  - Matthias Sanchez  - Contacted at admission: No  - Concerns or updates: None   Dispo: Expected discharge date: 2-3 days pending clinical improvement                  Interval History:   Clarke Moreira reports that he feels his lower extremity edema is improving.  Currently is complaining of a mild headache.  He denies any neurological changes.  No acute overnight events.  She also feels that the erythema of his lower extremities has decreased from yesterday.  No fevers chills nausea or vomiting.            Review of Systems:   CONSTITUTIONAL: no fatigue, no unexpected change in weight  SKIN: improved erythema of BLE   EYES: no acute vision problems or changes  ENT: no ear problems, no mouth problems, no throat problems  RESP: no significant cough, no shortness of breath  CV: no chest pain, no palpitations, no new or worsening peripheral edema  GI: no nausea, no vomiting, no constipation, no diarrhea  NEURO: postitive for headache, no numbness, tingling, or weakness        Physical Exam (Resident / Clinician):   Vitals were reviewed  Temp: 97.8  F (36.6  C) Temp src: Oral BP: 118/67 Pulse: 94 Heart Rate: 104 Resp: 18 SpO2: 96 % O2 Device: Nasal cannula Oxygen Delivery: 2 LPM    Constitutional:   awake, alert, cooperative, no apparent distress, and appears stated age, morbidly obese      Lungs:   No increased work of breathing, good air exchange, clear to auscultation bilaterally, no crackles or wheezing, limited by body habitus      Cardiovascular:   irregularly irregular rhythm and no murmur noted, no annie, no rub      Abdomen:   Obese, soft, non-distended, non-tender, no masses palpated, no hepatosplenomegally     Musculoskeletal:   Bilateral lower extremity  edema with stasis changes and bullae formation, no discharge, erythema has receeded from the lines, bilateral tenderness with palpation     Neurologic:   Awake, alert, oriented to name, place and time.  No focal deficits.        Intake/Output Summary (Last 24 hours) at 01/06/18 0843  Last data filed at 01/06/18 0753   Gross per 24 hour   Intake             4800 ml   Output             9455 ml   Net            -4655 ml           Data:   ROUTINE LABS (Last four results)  CMP  Recent Labs  Lab 01/06/18  0728 01/06/18  0052 01/05/18 1818 01/05/18  1109    140 140 140   POTASSIUM 3.4 3.4 3.7 3.3*   CHLORIDE 102 101 102 101   CO2 28 32 30 32   ANIONGAP 10 7 8 6   GLC 97 96 115* 103*   BUN 8 9 9 10   CR 0.79 0.79 0.78 0.84   GFRESTIMATED >90 >90 >90 >90   GFRESTBLACK >90 >90 >90 >90   CHERI 8.9 8.8 9.2 9.4   MAG 2.0  --  1.9 2.0   PHOS 4.0  --  3.7  --    PROTTOTAL  --   --   --  7.6   ALBUMIN  --   --   --  3.1*   BILITOTAL  --   --   --  1.1   ALKPHOS  --   --   --  87   AST  --   --   --  16   ALT  --   --   --  16     CBC  Recent Labs  Lab 01/06/18  0728 01/05/18  1109   WBC 7.2 6.5   RBC 4.31* 4.45   HGB 12.7* 13.4   HCT 40.8 42.5   MCV 95 96   MCH 29.5 30.1   MCHC 31.1* 31.5   RDW 15.7* 15.8*    182     INR  Recent Labs  Lab 01/06/18  0728 01/05/18  1109   INR 1.92* 2.01*     CRP  Recent Labs  Lab 01/06/18  0728 01/05/18  1109   CRP 8.9* 6.2       Blood culture:  Invalid input(s): BC   Urine culture:  No results for input(s): URC in the last 168 hours.  All cultures:    Recent Labs  Lab 01/05/18  1822 01/05/18  1818   CULT No growth after 10 hours No growth after 10 hours         Medications:     Current Facility-Administered Medications   Medication     [START ON 1/7/2018] pneumococcal vaccine (PNEUMOVAX 23-lisa) injection 0.5 mL     aspirin EC EC tablet 81 mg     atorvastatin (LIPITOR) tablet 40 mg     ipratropium - albuterol 0.5 mg/2.5 mg/3 mL (DUONEB) neb solution 3 mL     levothyroxine  (SYNTHROID/LEVOTHROID) tablet 150 mcg     lisinopril (PRINIVIL/Zestril) tablet 2.5 mg     metoprolol (TOPROL-XL) 24 hr tablet 100 mg     multivitamin, therapeutic with minerals (THERA-VIT-M) tablet 1 tablet     fish oil-omega-3 fatty acids capsule 1 g     spironolactone (ALDACTONE) tablet 25 mg     tamsulosin (FLOMAX) capsule 0.4 mg     naloxone (NARCAN) injection 0.1-0.4 mg     lidocaine 1 % 1 mL     lidocaine (LMX4) kit     sodium chloride (PF) 0.9% PF flush 3 mL     sodium chloride (PF) 0.9% PF flush 3 mL     Patient is already receiving anticoagulation with heparin, enoxaparin (LOVENOX), warfarin (COUMADIN)  or other anticoagulant medication     potassium chloride SA (K-DUR/KLOR-CON M) CR tablet 20-40 mEq     potassium chloride (KLOR-CON) Packet 20-40 mEq     potassium chloride 10 mEq in 100 mL sterile water intermittent infusion (premix)     potassium chloride 10 mEq in 100 mL intermittent infusion with 10 mg lidocaine     potassium chloride 20 mEq in 50 mL intermittent infusion     acetaminophen (TYLENOL) tablet 650 mg     ibuprofen (ADVIL/MOTRIN) tablet 600 mg     senna-docusate (SENOKOT-S;PERICOLACE) 8.6-50 MG per tablet 1 tablet    Or     senna-docusate (SENOKOT-S;PERICOLACE) 8.6-50 MG per tablet 2 tablet     polyethylene glycol (MIRALAX/GLYCOLAX) Packet 17 g     ondansetron (ZOFRAN-ODT) ODT tab 4 mg    Or     ondansetron (ZOFRAN) injection 4 mg     prochlorperazine (COMPAZINE) injection 5 mg    Or     prochlorperazine (COMPAZINE) tablet 5 mg    Or     prochlorperazine (COMPAZINE) Suppository 12.5 mg     glucose 40 % gel 15-30 g    Or     dextrose 50 % injection 25-50 mL    Or     glucagon injection 1 mg     insulin aspart (NovoLOG) inj (RAPID ACTING)     insulin aspart (NovoLOG) inj (RAPID ACTING)     sulfamethoxazole-trimethoprim (BACTRIM DS/SEPTRA DS) 800-160 MG per tablet 1 tablet     lactobacillus rhamnosus (GG) (CULTURELL) capsule 1 capsule     furosemide (LASIX) injection 40 mg     Warfarin Therapy  Reminder (Check START DATE - warfarin may be starting in the FUTURE)       Caring Physician: Shamar Virk MD  [facility], [facility]  Pager Contact: see Physician sticky note

## 2018-01-06 NOTE — PLAN OF CARE
Problem: Patient Care Overview  Goal: Plan of Care/Patient Progress Review  Patient admitted for BLE cellulitis and edema. BLE redness and blistering. Marked per MD. Patient alert and oriented x4. Tele A-fib and PVC's. ,s. Oxygen sats 92 - 88% on room air. No c/o chest pain or SOB. MD notified and in patient room for assessment. Order for EKG. Gave iv Lasix 40 mg, Potassium 40 mEq, iv metoprolol 5 mg. Placed on oxygen 2 LPM via NC. Patient using urinal at bedside. Good UOP. Strict I&O's. Ambulating with SBA and walker. Call light in reach. Patient able to make his needs known. Will continue to monitor and follow plan of care.

## 2018-01-06 NOTE — PLAN OF CARE
Problem: Patient Care Overview  Goal: Plan of Care/Patient Progress Review  Outcome: No Change  Pt A&Ox4.  Tele A-fib, intermittently RVR and tachycardic- MD aware.  VSS on 2L NC.  C/o HA this AM, did not sleep last night, declined intervention.  L PIV infiltrated, site changed.  IV Lasix push given as scheduled and pt started on Lasix drip 20 mg/hr.  Cardiology following.  1.5L FR started the middle of today, pt over limit @ 2070 ml.  Good appetite, 2 g Na+ diet.  Using bedside urinal, no BM this shift.  BLE US completed at bedside.  BLE red and one blister ruptured, site cleansed and dressed with primapore. Continue to monitor and follow POC.

## 2018-01-07 ENCOUNTER — APPOINTMENT (OUTPATIENT)
Dept: OCCUPATIONAL THERAPY | Facility: CLINIC | Age: 68
DRG: 292 | End: 2018-01-07
Attending: FAMILY MEDICINE
Payer: COMMERCIAL

## 2018-01-07 ENCOUNTER — APPOINTMENT (OUTPATIENT)
Dept: PHYSICAL THERAPY | Facility: CLINIC | Age: 68
DRG: 292 | End: 2018-01-07
Payer: COMMERCIAL

## 2018-01-07 LAB
ANION GAP SERPL CALCULATED.3IONS-SCNC: 10 MMOL/L (ref 3–14)
ANION GAP SERPL CALCULATED.3IONS-SCNC: 9 MMOL/L (ref 3–14)
BUN SERPL-MCNC: 10 MG/DL (ref 7–30)
BUN SERPL-MCNC: 11 MG/DL (ref 7–30)
CALCIUM SERPL-MCNC: 9 MG/DL (ref 8.5–10.1)
CALCIUM SERPL-MCNC: 9 MG/DL (ref 8.5–10.1)
CHLORIDE SERPL-SCNC: 93 MMOL/L (ref 94–109)
CHLORIDE SERPL-SCNC: 96 MMOL/L (ref 94–109)
CO2 SERPL-SCNC: 32 MMOL/L (ref 20–32)
CO2 SERPL-SCNC: 33 MMOL/L (ref 20–32)
CREAT SERPL-MCNC: 0.86 MG/DL (ref 0.66–1.25)
CREAT SERPL-MCNC: 0.92 MG/DL (ref 0.66–1.25)
ERYTHROCYTE [DISTWIDTH] IN BLOOD BY AUTOMATED COUNT: 15.7 % (ref 10–15)
GFR SERPL CREATININE-BSD FRML MDRD: 82 ML/MIN/1.7M2
GFR SERPL CREATININE-BSD FRML MDRD: 88 ML/MIN/1.7M2
GLUCOSE BLDC GLUCOMTR-MCNC: 107 MG/DL (ref 70–99)
GLUCOSE BLDC GLUCOMTR-MCNC: 121 MG/DL (ref 70–99)
GLUCOSE BLDC GLUCOMTR-MCNC: 121 MG/DL (ref 70–99)
GLUCOSE BLDC GLUCOMTR-MCNC: 124 MG/DL (ref 70–99)
GLUCOSE BLDC GLUCOMTR-MCNC: 147 MG/DL (ref 70–99)
GLUCOSE SERPL-MCNC: 100 MG/DL (ref 70–99)
GLUCOSE SERPL-MCNC: 110 MG/DL (ref 70–99)
HCT VFR BLD AUTO: 44.9 % (ref 40–53)
HGB BLD-MCNC: 14.1 G/DL (ref 13.3–17.7)
INR PPP: 1.89 (ref 0.86–1.14)
MAGNESIUM SERPL-MCNC: 2.2 MG/DL (ref 1.6–2.3)
MCH RBC QN AUTO: 29.9 PG (ref 26.5–33)
MCHC RBC AUTO-ENTMCNC: 31.4 G/DL (ref 31.5–36.5)
MCV RBC AUTO: 95 FL (ref 78–100)
PHOSPHATE SERPL-MCNC: 3.8 MG/DL (ref 2.5–4.5)
PLATELET # BLD AUTO: 235 10E9/L (ref 150–450)
POTASSIUM SERPL-SCNC: 3.2 MMOL/L (ref 3.4–5.3)
POTASSIUM SERPL-SCNC: 3.5 MMOL/L (ref 3.4–5.3)
RBC # BLD AUTO: 4.71 10E12/L (ref 4.4–5.9)
SODIUM SERPL-SCNC: 135 MMOL/L (ref 133–144)
SODIUM SERPL-SCNC: 137 MMOL/L (ref 133–144)
WBC # BLD AUTO: 7.9 10E9/L (ref 4–11)

## 2018-01-07 PROCEDURE — 85610 PROTHROMBIN TIME: CPT | Performed by: FAMILY MEDICINE

## 2018-01-07 PROCEDURE — 85027 COMPLETE CBC AUTOMATED: CPT | Performed by: FAMILY MEDICINE

## 2018-01-07 PROCEDURE — 40000193 ZZH STATISTIC PT WARD VISIT: Performed by: PHYSICAL THERAPIST

## 2018-01-07 PROCEDURE — 83735 ASSAY OF MAGNESIUM: CPT | Performed by: FAMILY MEDICINE

## 2018-01-07 PROCEDURE — 25000132 ZZH RX MED GY IP 250 OP 250 PS 637: Performed by: FAMILY MEDICINE

## 2018-01-07 PROCEDURE — 80048 BASIC METABOLIC PNL TOTAL CA: CPT | Performed by: FAMILY MEDICINE

## 2018-01-07 PROCEDURE — 97116 GAIT TRAINING THERAPY: CPT | Mod: GP | Performed by: PHYSICAL THERAPIST

## 2018-01-07 PROCEDURE — 25000132 ZZH RX MED GY IP 250 OP 250 PS 637: Performed by: NURSE PRACTITIONER

## 2018-01-07 PROCEDURE — 97140 MANUAL THERAPY 1/> REGIONS: CPT | Mod: GO

## 2018-01-07 PROCEDURE — 12000001 ZZH R&B MED SURG/OB UMMC

## 2018-01-07 PROCEDURE — 97530 THERAPEUTIC ACTIVITIES: CPT | Mod: GP | Performed by: PHYSICAL THERAPIST

## 2018-01-07 PROCEDURE — 40000809 ZZH STATISTIC NO DOCUMENTATION TO SUPPORT CHARGE

## 2018-01-07 PROCEDURE — 00000146 ZZHCL STATISTIC GLUCOSE BY METER IP

## 2018-01-07 PROCEDURE — 40000275 ZZH STATISTIC RCP TIME EA 10 MIN

## 2018-01-07 PROCEDURE — 80048 BASIC METABOLIC PNL TOTAL CA: CPT | Performed by: STUDENT IN AN ORGANIZED HEALTH CARE EDUCATION/TRAINING PROGRAM

## 2018-01-07 PROCEDURE — 40000133 ZZH STATISTIC OT WARD VISIT

## 2018-01-07 PROCEDURE — 36415 COLL VENOUS BLD VENIPUNCTURE: CPT | Performed by: FAMILY MEDICINE

## 2018-01-07 PROCEDURE — 25000125 ZZHC RX 250: Performed by: NURSE PRACTITIONER

## 2018-01-07 PROCEDURE — 36415 COLL VENOUS BLD VENIPUNCTURE: CPT | Performed by: STUDENT IN AN ORGANIZED HEALTH CARE EDUCATION/TRAINING PROGRAM

## 2018-01-07 PROCEDURE — 99232 SBSQ HOSP IP/OBS MODERATE 35: CPT | Performed by: NURSE PRACTITIONER

## 2018-01-07 PROCEDURE — 25000131 ZZH RX MED GY IP 250 OP 636 PS 637: Performed by: FAMILY MEDICINE

## 2018-01-07 PROCEDURE — 84100 ASSAY OF PHOSPHORUS: CPT | Performed by: FAMILY MEDICINE

## 2018-01-07 PROCEDURE — 94660 CPAP INITIATION&MGMT: CPT

## 2018-01-07 RX ORDER — POTASSIUM CHLORIDE 750 MG/1
20 TABLET, EXTENDED RELEASE ORAL ONCE
Status: COMPLETED | OUTPATIENT
Start: 2018-01-07 | End: 2018-01-07

## 2018-01-07 RX ORDER — POTASSIUM CHLORIDE 750 MG/1
40 TABLET, EXTENDED RELEASE ORAL 2 TIMES DAILY
Status: DISCONTINUED | OUTPATIENT
Start: 2018-01-07 | End: 2018-01-10 | Stop reason: HOSPADM

## 2018-01-07 RX ORDER — WARFARIN SODIUM 7.5 MG/1
15 TABLET ORAL
Status: COMPLETED | OUTPATIENT
Start: 2018-01-07 | End: 2018-01-07

## 2018-01-07 RX ADMIN — WARFARIN SODIUM 15 MG: 7.5 TABLET ORAL at 17:53

## 2018-01-07 RX ADMIN — LISINOPRIL 2.5 MG: 2.5 TABLET ORAL at 08:13

## 2018-01-07 RX ADMIN — Medication 1 CAPSULE: at 08:02

## 2018-01-07 RX ADMIN — SULFAMETHOXAZOLE AND TRIMETHOPRIM 1 TABLET: 800; 160 TABLET ORAL at 19:25

## 2018-01-07 RX ADMIN — Medication 1 CAPSULE: at 19:25

## 2018-01-07 RX ADMIN — INSULIN ASPART 1 UNITS: 100 INJECTION, SOLUTION INTRAVENOUS; SUBCUTANEOUS at 12:33

## 2018-01-07 RX ADMIN — TAMSULOSIN HYDROCHLORIDE 0.4 MG: 0.4 CAPSULE ORAL at 19:25

## 2018-01-07 RX ADMIN — SPIRONOLACTONE 25 MG: 25 TABLET ORAL at 19:25

## 2018-01-07 RX ADMIN — POTASSIUM CHLORIDE 20 MEQ: 750 TABLET, EXTENDED RELEASE ORAL at 11:05

## 2018-01-07 RX ADMIN — POTASSIUM CHLORIDE 40 MEQ: 750 TABLET, EXTENDED RELEASE ORAL at 08:02

## 2018-01-07 RX ADMIN — SULFAMETHOXAZOLE AND TRIMETHOPRIM 1 TABLET: 800; 160 TABLET ORAL at 11:05

## 2018-01-07 RX ADMIN — MULTIPLE VITAMINS W/ MINERALS TAB 1 TABLET: TAB at 08:03

## 2018-01-07 RX ADMIN — LISINOPRIL 2.5 MG: 2.5 TABLET ORAL at 19:25

## 2018-01-07 RX ADMIN — SPIRONOLACTONE 25 MG: 25 TABLET ORAL at 08:03

## 2018-01-07 RX ADMIN — ATORVASTATIN CALCIUM 40 MG: 40 TABLET, FILM COATED ORAL at 08:02

## 2018-01-07 RX ADMIN — POTASSIUM CHLORIDE 40 MEQ: 750 TABLET, EXTENDED RELEASE ORAL at 19:24

## 2018-01-07 RX ADMIN — METOPROLOL SUCCINATE 100 MG: 50 TABLET, EXTENDED RELEASE ORAL at 19:25

## 2018-01-07 RX ADMIN — Medication 1 G: at 08:03

## 2018-01-07 RX ADMIN — ASPIRIN 81 MG: 81 TABLET, COATED ORAL at 08:03

## 2018-01-07 RX ADMIN — Medication 20 MG/HR: at 04:50

## 2018-01-07 RX ADMIN — LEVOTHYROXINE SODIUM 175 MCG: 100 TABLET ORAL at 08:02

## 2018-01-07 ASSESSMENT — ENCOUNTER SYMPTOMS
WEAKNESS: 0
NAUSEA: 0
ABDOMINAL PAIN: 0
ORTHOPNEA: 0
COUGH: 0
FEVER: 0
SHORTNESS OF BREATH: 0
PND: 0
VOMITING: 0

## 2018-01-07 NOTE — PROGRESS NOTES
01/07/18 0949   Quick Adds   Type of Visit Initial PT Evaluation   Living Environment   Lives With alone   Living Arrangements apartment   Home Accessibility tub/shower is not walk in;other (see comments)  (elevators to 16th floor)   Number of Stairs to Enter Home 0   Number of Stairs Within Home 0   Stair Railings at Home none   Living Environment Comment PT: having groceries delivered, doesn't leave the apartment very often, trying to find a cleaning service. Patient stating his apartment was not set up for him after discharge from TCU, but it is much improved now.    Self-Care   Dominant Hand right   Usual Activity Tolerance fair   Current Activity Tolerance poor   Regular Exercise no   Equipment Currently Used at Home cane, straight;walker, rolling;hospital bed;other (see comments)  (bariatric recliner)   Functional Level Prior   Ambulation 1-->assistive equipment   Transferring 0-->independent   Toileting 1-->assistive equipment   Bathing 0-->independent   Dressing 0-->independent   Eating 0-->independent   Communication 0-->understands/communicates without difficulty   Swallowing 0-->swallows foods/liquids without difficulty   Cognition 0 - no cognition issues reported   Fall history within last six months yes   Number of times patient has fallen within last six months 2   Which of the above functional risks had a recent onset or change? Transferring;ambulation   Prior Functional Level Comment PLOF was modified IND with home care nursing and home PT visiting. He now has a hospital bed and a bariatric recliner chair. He is in the process of making his living environment more accessible. He has his groceries delivered.    General Information   Onset of Illness/Injury or Date of Surgery - Date 01/05/18   Referring Physician Devorah Torres DO   Patient/Family Goals Statement Return home    Pertinent History of Current Problem (include personal factors and/or comorbidities that impact the POC) This is a 67 year  old male with a past medical history significant for HFrEF, chronic venous stasis, atrial fibrillation, DM-Type-2, PVD, morbid obesity, and depression who presented to the hospital for concerns of lower extremities cellulitis. Patient was previously at TCU for 2 weeks.   Precautions/Limitations fall precautions   Heart Disease Risk Factors Lack of physical activity;Overweight;Stress;Medical history;Age;Gender   General Observations Patient is motivated to participate in therapies and was pleasent    Cognitive Status Examination   Orientation orientation to person, place and time   Level of Consciousness alert   Follows Commands and Answers Questions 100% of the time   Personal Safety and Judgment intact   Memory intact   Cognitive Comment Patient was a good historian able to tell patient accurate information. No formal cog screen completed.   Pain Assessment   Patient Currently in Pain No   Integumentary/Edema   Integumentary/Edema Comments Bilateral reddness and wounds on LLE with open to air.    Posture    Posture Comments Forward head, rounded shoulders, kyphosis   Range of Motion (ROM)   ROM Comment WFL    Strength   Strength Comments antigravity strength present   Bed Mobility   Bed Mobility Comments transfers supine to sit EOB with Vasyl for LE negotiation and use of handrail and HOB elevated to 30 degrees   Transfer Skills   Transfer Comments sit to stand with FWW and CGA   Gait   Gait Comments x300 feet with FWW and CGA-SBA    Balance   Balance Comments Sitting: static IND, dynamic SBA; Standing: static, requires external support and SBA, dynamic requires external support and CGA    Sensory Examination   Sensory Perception Comments decreased in bilat. LE's due to edema   Coordination   Coordination Comments DNT    Muscle Tone   Muscle Tone Comments WFL   General Therapy Interventions   Planned Therapy Interventions balance training;bed mobility training;gait training;neuromuscular  "re-education;ROM;strengthening;stretching;transfer training;home program guidelines   Clinical Impression   Criteria for Skilled Therapeutic Intervention yes, treatment indicated   PT Diagnosis impaired transfers   Influenced by the following impairments decreased strength, decreased activity tolerance, impaired balance, edema, wounds, pain   Functional limitations due to impairments decreased functional IND    Clinical Presentation Stable/Uncomplicated   Clinical Decision Making (Complexity) Low complexity   Therapy Frequency` daily   Predicted Duration of Therapy Intervention (days/wks) 7 days   Anticipated Discharge Disposition Home with Assist;Home with Home Therapy;Other (see comments)  (increase PCA services )   Risk & Benefits of therapy have been explained Yes   Patient, Family & other staff in agreement with plan of care Yes   Clinical Impression Comments Patient was recently discharged isaac from TCU with home care nursing and PT/OT services. He now has a hospital bed and bariatric recliner. He may benefit from increased PCA services to be successful at home.   [facility] TM \"6 Clicks\"   2016, Trustees of [facility], under license to [employer].  All rights reserved.   6 Clicks Short Forms Basic Mobility Inpatient Short Form   [facility]  \"6 Clicks\" V.2 Basic Mobility Inpatient Short Form   1. Turning from your back to your side while in a flat bed without using bedrails? 3 - A Little   2. Moving from lying on your back to sitting on the side of a flat bed without using bedrails? 3 - A Little   3. Moving to and from a bed to a chair (including a wheelchair)? 3 - A Little   4. Standing up from a chair using your arms (e.g., wheelchair, or bedside chair)? 3 - A Little   5. To walk in hospital room? 3 - A Little   6. Climbing 3-5 steps with a railing? 2 - A Lot   Basic Mobility Raw Score (Score out of 24.Lower scores equate to lower levels of function) 17   Total Evaluation " Time   Total Evaluation Time (Minutes) 15

## 2018-01-07 NOTE — PLAN OF CARE
Problem: Patient Care Overview  Goal: Plan of Care/Patient Progress Review  OT/Edema/5A:  Lymphedema assessment completed, treatment initiated.  Pt presents with cellulitis and B LE edema - borders drawn and abx initiated on 1/5/2018 with notable reductions (MD royal'agustin GCB this date). Pt with 1+ pitting throughout B LEs, venous stasis, and multiple blisters/open wounds throughout distal extremities. RN performed skin cares and wrapped wounds with kerlix dressing from ankles to mid shin.  Pt with mild edema in B thighs, but no significant scrotal edema present.  GCB applied from MTPs to knee creases for edema management.  Wraps may be worn 24 hours; however, please remove if increased pain, numbness/tingling, or soiling occurs.     Recommend continued home health lymphedema services upon DC.

## 2018-01-07 NOTE — PLAN OF CARE
Problem: Patient Care Overview  Goal: Plan of Care/Patient Progress Review  Outcome: Improving  A&Ox4.  VSS on 2L NC except tachycardic.  Tele D/Geovanny.  Cardiology following, decreased lasix drip to 10 mg/hr, infusing via R PIV.  Denies pain.  Good appetite, 2 g Na+ diet.  Following 1.5L FR.  Pt continues to have good urine OP using bedside urinal.  No BM since 1/3, pt wants to wait later in the day for PRN senna.  Walked in halls with PT.  BLE cleansed with wound cleanser, mepilex placed on L outer shin rupture blister and R inner shin blister, legs wrapped in gauze.  Lymphedema placed compression wraps.  PO bactrim for cellulitis.  Scheduled K+ given and one time 20 mEq dose, K+ 3.2.  Continue to monitor and follow POC.

## 2018-01-07 NOTE — PLAN OF CARE
Problem: Patient Care Overview  Goal: Plan of Care/Patient Progress Review  PT/5A: Evaluation completed and treatment initiated.   Discharge Planner PT   Patient plan for discharge: Home with home PT/OT/nursing   Current status: Patient is completing bed mobility with Vasyl for returning to bed for LE negotiation, sit to stand with CGA-SBA with FWW, ambulated x300 feet with FWW and CGA. Desaturated with activity to 88% and returned to >90% with 1 L oxygen via nasal cannula and instruction for deep breathing.   Barriers to return to prior living situation: decreased independence for bed mobility, transfers, and gait; patient lives alone; tub/shower are step over and not accessible   Recommendations for discharge: Home with home PT/OT/Nursing/lymphedema management and possible increase in PCA hours/bathing assistance as well as cleaning.   Rationale for recommendations: Patient is slightly below baseline functioning at this time and is requiring A with all mobility. He would benefit from continued skilled PT/OT intervention and could do so at home, but would benefit from increased services.        Entered by: Rachel Reese 01/07/2018 1:27 PM

## 2018-01-07 NOTE — PROGRESS NOTES
[facility] - Inpatient daily progress note    Date of admission: 1/5/2018  Date of service: January 7, 2018       Assessment and Plan:   Assessment:   This is a 67 year old male with history of HFrEF, chronic venous stasis, atrial fibrillation, type 2 diabetes, PVD, morbid obesity, and depression admitted for worsening lower extremity edema and volume overload with some concern for lower extremity cellulitis.  Patient Active Problem List   Diagnosis     Atrial fibrillation (H)     Hypothyroidism     Basal cell carcinoma of skin of face     CTS (carpal tunnel syndrome)     Myopia     Plantar fasciitis     Presbyopia     Regular astigmatism     Neoplasm of uncertain behavior of skin     Venous stasis     Type 2 diabetes mellitus with hyperglycemia, without long-term current use of insulin (H)     Morbid obesity (H)     Depression with anxiety     Fall from chair, initial encounter     Hyperlipidemia LDL goal <100     Fall     SOB (shortness of breath)     BiPAP (biphasic positive airway pressure) dependence     Lymphedema     Chronic systolic congestive heart failure (H)     Essential hypertension with goal blood pressure less than 140/90     Urinary retention     Hypokalemia     Constipation, unspecified constipation type     Cellulitis      Plan:    ## Concern for bilateral LE cellulitis:   ## Chronic Venous stasis with dermatitis: Improved   Improved venous stasis after aggressive diuresis. He remains hemodynamically stable.   - Continue Bactrim 800-160 mg BID   - Continue lactobacillus BID   - Trend CBC and CRP   - Lymphedema/Physical and occupational therapy consults  - Continue to monitor vitals closely      ## Symptomatic HFrEF:   ## Pulmonary congestion:   Improved dyspnea, orthopnea, paroxysmal dyspnea, and edema after agressive diuresis. Cardiology following and recommendations appreciated.   - Lasix gtt decreased to 10 mg/hr   - Continue to monitor  volume status. Transition to oral diuretics once euvolemic   - BMP and electrolytes BID   - Daily weights and strict I/O   - Continue to monitor vitals closely   - Continue CPAP with home settings   - Continue Metoprolol 100 mg daily, and Spironolactone 25 mg daily.     ## Atrial Fibrillation:   CHADS-VaSC 5.  Has been intermittently having heart rates as high as 140 over the past 12 hours.  These have not been sustained.  This was discussed with cardiology who recommended continuing diuresis and not addressing the heart rate at this time.  - Continue PTA Metoprolol 100 mg daily   - Pharmacy to dose Warfarin   - Daily INR   - Telemetry monitoring for questionable Colace [id number] weakness to    ## Obesity Hypoventilation syndrome:  ## Sleep Apnea:  - Continue diuresis per above   - Continue CPAP with home settings     ## Coronary Artery Disease:   ## PAD:   ## Essential Hypertension:   - Continue PTA Aspirin 81 mg daily   - Continue high intensity Lipitor 40 mg daily   - Continue PTA BP medications per above      ## Hypothyroidism:    TSH (11/2017) elevated at 4.89, repeat today was 6.8.   - Increase PTA Levothyroxine from 150 mcg daily to 175 mcg   - recheck TSH as outpatient in 6 weeks      ## DM-Type-2: Controlled   A1c (11/2017): 6.3  - Hold PTA metformin 500 mg BID   - MSSI      ## BPH: Chronic, stable   - Continue PTA Flomax 0.4 mg daily      Daily cares -   F: PO   E: stable  N: Low fat diet   Lines: PIV,   Activity:-Up as tolerated  CODE:Full Code  Prophylaxis: Warfarin   PCP communication:  - Matthias Sanchez  - Contacted at admission: No  - Concerns or updates: None   Dispo: Expected discharge date: 2-3 days pending clinical improvement                  Interval History:   Patient was seen and evaluated this morning. He has no complaints. Vitals signs and nurses notes reviewed. Medical management including labs, vitals and care plan were discussed with patient. Patient would be update during rounds with  attending physician with additional management and plan.             Review of Systems:   Review of Systems   Constitutional: Negative for fever and malaise/fatigue.   Respiratory: Negative for cough and shortness of breath.    Cardiovascular: Positive for leg swelling. Negative for chest pain, orthopnea and PND.   Gastrointestinal: Negative for abdominal pain, nausea and vomiting.   Neurological: Negative for weakness.          Physical Exam (Resident / Clinician):   Vitals were reviewed  Temp: 96.7  F (35.9  C) Temp src: Axillary BP: 105/57 Pulse: 110 Heart Rate: 110 Resp: 18 SpO2: 91 % O2 Device: Nasal cannula Oxygen Delivery: 2 LPM    Physical Exam   Constitutional: No distress.   HENT:   Head: Normocephalic and atraumatic.   Cardiovascular: Normal rate and normal heart sounds.    Pulmonary/Chest: Effort normal and breath sounds normal. No respiratory distress. He has no wheezes. He has no rales.   Abdominal: Soft. He exhibits distension. He exhibits no mass. There is no tenderness. There is no rebound and no guarding.   Musculoskeletal: Normal range of motion. He exhibits edema (Bilateral 2+ edema. Legs dressing CDI).   Skin: He is not diaphoretic.     Intake/Output Summary (Last 24 hours) at 01/07/18 1613  Last data filed at 01/07/18 1411   Gross per 24 hour   Intake             1020 ml   Output             9275 ml   Net            -8255 ml           Data:   ROUTINE LABS (Last four results)  CMP    Recent Labs  Lab 01/07/18  0114 01/06/18  1733 01/06/18  0728 01/06/18  0052 01/05/18  1818 01/05/18  1109    137 139 140 140 140   POTASSIUM 3.2* 3.2* 3.4 3.4 3.7 3.3*   CHLORIDE 96 96 102 101 102 101   CO2 32 32 28 32 30 32   ANIONGAP 10 9 10 7 8 6   * 110* 97 96 115* 103*   BUN 11 9 8 9 9 10   CR 0.86 0.82 0.79 0.79 0.78 0.84   GFRESTIMATED 88 >90 >90 >90 >90 >90   GFRESTBLACK >90 >90 >90 >90 >90 >90   CHERI 9.0 9.1 8.9 8.8 9.2 9.4   MAG  --  2.1 2.0  --  1.9 2.0   PHOS  --  4.6* 4.0  --  3.7  --     PROTTOTAL  --   --   --   --   --  7.6   ALBUMIN  --   --   --   --   --  3.1*   BILITOTAL  --   --   --   --   --  1.1   ALKPHOS  --   --   --   --   --  87   AST  --   --   --   --   --  16   ALT  --   --   --   --   --  16     CBC    Recent Labs  Lab 01/07/18  0906 01/06/18  0728 01/05/18  1109   WBC 7.9 7.2 6.5   RBC 4.71 4.31* 4.45   HGB 14.1 12.7* 13.4   HCT 44.9 40.8 42.5   MCV 95 95 96   MCH 29.9 29.5 30.1   MCHC 31.4* 31.1* 31.5   RDW 15.7* 15.7* 15.8*    202 182     INR    Recent Labs  Lab 01/07/18  0906 01/06/18  0728 01/05/18  1109   INR 1.89* 1.92* 2.01*     CRP    Recent Labs  Lab 01/06/18  0728 01/05/18  1109   CRP 8.9* 6.2       Blood culture:  Invalid input(s): BC   Urine culture:  No results for input(s): URC in the last 168 hours.  All cultures:    Recent Labs  Lab 01/05/18  1822 01/05/18  1818   CULT No growth after 2 days No growth after 2 days         Medications:     Current Facility-Administered Medications   Medication     potassium chloride SA (K-DUR/KLOR-CON M) CR tablet 40 mEq     warfarin (COUMADIN) tablet 15 mg     pneumococcal vaccine (PNEUMOVAX 23-lisa) injection 0.5 mL     furosemide (LASIX) 500 mg/50mL infusion ADULT MAX CONC     lisinopril (PRINIVIL/Zestril) tablet 2.5 mg     levothyroxine (SYNTHROID/LEVOTHROID) tablet 175 mcg     aspirin EC EC tablet 81 mg     atorvastatin (LIPITOR) tablet 40 mg     ipratropium - albuterol 0.5 mg/2.5 mg/3 mL (DUONEB) neb solution 3 mL     metoprolol (TOPROL-XL) 24 hr tablet 100 mg     multivitamin, therapeutic with minerals (THERA-VIT-M) tablet 1 tablet     fish oil-omega-3 fatty acids capsule 1 g     spironolactone (ALDACTONE) tablet 25 mg     tamsulosin (FLOMAX) capsule 0.4 mg     naloxone (NARCAN) injection 0.1-0.4 mg     lidocaine 1 % 1 mL     lidocaine (LMX4) kit     sodium chloride (PF) 0.9% PF flush 3 mL     sodium chloride (PF) 0.9% PF flush 3 mL     Patient is already receiving anticoagulation with heparin, enoxaparin (LOVENOX),  warfarin (COUMADIN)  or other anticoagulant medication     potassium chloride SA (K-DUR/KLOR-CON M) CR tablet 20-40 mEq     potassium chloride (KLOR-CON) Packet 20-40 mEq     potassium chloride 10 mEq in 100 mL sterile water intermittent infusion (premix)     potassium chloride 10 mEq in 100 mL intermittent infusion with 10 mg lidocaine     potassium chloride 20 mEq in 50 mL intermittent infusion     acetaminophen (TYLENOL) tablet 650 mg     ibuprofen (ADVIL/MOTRIN) tablet 600 mg     senna-docusate (SENOKOT-S;PERICOLACE) 8.6-50 MG per tablet 1 tablet    Or     senna-docusate (SENOKOT-S;PERICOLACE) 8.6-50 MG per tablet 2 tablet     polyethylene glycol (MIRALAX/GLYCOLAX) Packet 17 g     ondansetron (ZOFRAN-ODT) ODT tab 4 mg    Or     ondansetron (ZOFRAN) injection 4 mg     prochlorperazine (COMPAZINE) injection 5 mg    Or     prochlorperazine (COMPAZINE) tablet 5 mg    Or     prochlorperazine (COMPAZINE) Suppository 12.5 mg     glucose 40 % gel 15-30 g    Or     dextrose 50 % injection 25-50 mL    Or     glucagon injection 1 mg     insulin aspart (NovoLOG) inj (RAPID ACTING)     insulin aspart (NovoLOG) inj (RAPID ACTING)     sulfamethoxazole-trimethoprim (BACTRIM DS/SEPTRA DS) 800-160 MG per tablet 1 tablet     lactobacillus rhamnosus (GG) (CULTURELL) capsule 1 capsule     Warfarin Therapy Reminder (Check START DATE - warfarin may be starting in the FUTURE)       Caring Physician: Jerry Miller MD  [facility]  Pager Contact: see Physician sticky note

## 2018-01-07 NOTE — PLAN OF CARE
Problem: Patient Care Overview  Goal: Plan of Care/Patient Progress Review  Outcome: Improving  Pt admitted with lower extremity edema and cellulitis. A&Ox4. Pt on 2L NC. Pt on a continuous lasix gtt at 20 mg/ hr. Pt voiding adequate amounts in the urinal. Pt's weight was 394 lbs, 37 lbs down from admission.  HR was 110-130s, a-fib.  BP=97/52, and MD was notified. MD wanted the patient to receive his evening dose of Metoprolol and Lisinopril. Pt's legs were swollen and red. Pt also had blisters on his lower extremities. One of the blisters popped on the left leg, and dressing was in place. Pt on a 1.5L fluid restriction, pt over the restriction due to the order being placed later in the day. K+=3.2, and an one time order of potassium was given. Pt able to make his needs known. Continue with plan of care.

## 2018-01-07 NOTE — PROGRESS NOTES
"   01/07/18 1200   General Information   Discipline OT  (Edema )   Onset of Edema (\"2008, but not this bad.\" Pt was diagnosed with CHF in 2008)   Affected Body Part(s) Left LE;Right LE   Edema Etiology Infection   Etiology Comments Cellulitis, CHF, and hypoalbumic (3.1)    Pertinent history of current problem (PT: include personal factors and/or comorbidities that impact the POC; OT: include additional occupational profile info) 67 year old male with history of CHF R EF, chronic venous stasis, atrial fibrillation, type 2 diabetes, PVD, morbid obesity, and depression admitted for worsening lower extremity edema and volume overload with some concern for lower extremity cellulitis.   Special Tests for Edema Related Problem Ultrasound  (results negative for DVT )   Edema Precautions Cardiac Edema/CHF;Acute infection   Edema Precaution Comments Pt with cellulitis - borders drawn on 1/5/2018 and abx initiated on 1/5/2018, noted decrease in cellulitis.  Per MD, ok to initate GCB.    Pain   Patient currently in pain No   Edema Examination / Assessment   Skin Condition Pitting   Skin Condition Comments Edema afffecting B LEs - skin with multiple open blisters and wounds throghout.  Pt presents with redness in distal LEs, notable venous stasis, thick and yellowed toenails. 1+ pitting present in B LEs from toe lines to knee creases.  Kerlix bandage wrapped from B ankles to mid shin and skin cares performed by RN daily.  Pt has moderate edema in B thighs, no scrotal edema identified.    ROM   Range of Motion (WFL) no deficits were identified   Strength   Strength (WFL) other (describe)   Description of Strength Deficits Grossly decreased    Sensation   Sensation (WFL) other (describe)   Description of Sensation Deficits Pt states that the dorsum of B feet are numb ever since a fall that occurred in September.    Assessment/Plan   Patient presents with Edema   Assessment Pt with pitting B LE edema - pt reports worsening over the " "past week.  Pt does receive HHRN and lymphedema services 2-3x/week.  Pt states that when his lymphedema therapist arrived for her visit, the therapist suggested that pt go to the hospital because his legs didn't look right.  Pt states that he has had edema since being diagnosed with CHF in 2008, but \"not this bad.\"  Pt reports worsening over the past few days including blistering and weeping.  Pt's B LE edema currently impacting his comfort, ADL performance and ambulation.  Pt would benefit from IP OT and lymphedema services to promote return to functional baseline with ADLs and reduce risk for infection in B LE.    Assessment of Occupational Performance 3-5 Performance Deficits   Identified Performance Deficits Dressing, bathing, toileting, ambulation   Clinical Decision Making (Complexity) Low complexity   Planned Edema Interventions Gradient compression bandaging;Fit for compression garment;Exercises;Precautions to prevent infection/exacerbation   Treatment Frequency daily   Treatment Duration 2 weeks    Patient, Family and/or Staff in agreement with plan of care. Yes   Risks and benefits of treatment have been explained. Yes   Total Evaluation Time   Total Evaluation Time (Minutes) 2     "

## 2018-01-07 NOTE — PROVIDER NOTIFICATION
Pt's BP=97/52 and IR=158, asked MD if they want the patient to received his 2000 dose of 100mg Metoprolol and 2.5mf Lisinopril. MD to give the Metoprolol and Lisinopril.

## 2018-01-07 NOTE — CONSULTS
[facility]   Cardiology II Service / Advanced Heart Failure  Daily Progress Note  Date of Service: 1/7/2018      Patient: Clarke Moreira  MRN: [id number]  Admission Date: 1/5/2018  Hospital Day # 2    Assessment and Plan: Clarke Moreira is a 67 year old male with a past medical history of systolic heart failure, CAD, ICM (Estimated EF of 40-45%), venous stasis, afib, DM II, PAD, morbid obesity, and depression who was admitted for concerns of lower extremity edema cellulitis.  His remains hypervolemic, but is improving.  Per chart review lost 63 lbs overnight. Weight today is 374 lbs.  Admit weight 437.  May not be completely accurate as different scales were used to obtain weights.  His typical dry weight was listed previously at 415-420 lbs.  May need to revise dry weight once he is euvolemic.  We will sign off but are available as needed for questions or concerns.        Recommendations:  1.  Decrease Lasix gtt back to 10 mg/hr.  Once euvolemic, transition to oral diuretics.   2.  Up titrate ace inhibitor as tolerated for afterload reduction, as potassium level, kidney function, and blood pressure allow.   3.  Increase potassium to 40 meq twice daily.  Give one dose of potassium 20 meq supplementation this am for K 3.2   4.  Once euvolemic, if HR is still elevated, would increase Toprol XL for rate control.   5.  Would recommend ischemic eval if not done recently to r/o progressive CAD as a cause of acute decompensation.    6.  Continue daily weights.    7.  [facility] 1-2 weeks after discharge.  4.  Right heart cath once he appears euvolemic.      Chronic systolic heart failure secondary to to ICM, obesity hypoventilation syndrome, possibly ischemic, and dietary indiscretion with the holidays    Stage C  ACEi/ARB: yes, Lisinopril 2.5 mg twice daily. Titrate as tolerated for afterload reduction.  BB: yes, Toprol  mg daily.   Aldosterone antagonist: yes, spironolactone 25 mg BID  SCD  prophylaxis: does not meet criteria for implant  Fluid status: Hypervolemic.  Decrease Lasix gtt to 10 mg/hr.  Anticoagulation: Warfarin INR goal 2-3.    Antiplatelet:  ASA dose  81 mg daily  Sleep apnea: yes, CPAP  NSAID use:  Contraindicated.  Avoid use.  HTN:  Controlled.  Taking Metoprolol and lisinopril.          CAD:  Previous documented history per record review but unable to find more specifics on record review.  Obtain NIMA to look for previous coronary angiograms.  If no recent ischemic eval, would consider further workup to r/o as cause of acute decompensation.   -Stable.  No chest pain  -Continue Statin, beta blocker, ASA, spironolactone, and ACE inhibitor.     Atrial Fibrillation:  Chads Vasc score: 5.  EKG obtained today showed Afib with rates in the 100's.    -Taking warfarin with INR goal of 2-3.    -Continue Metoprolol  mg daily. Avoid further titration for now as he is acutely decompensated. Diurese as outlined above.  Monitor HR for now.    -Continue with CPAP    ================================================================    Interval History/ROS: Feels better.  No SOB at rest.  SOB with minimal activity, ie getting out of bed.  Legs weeping over night but feels less edematous.  Stomach feels less firm. Not following fluid restrictions and eats processed food.  Doesn't add salt to meals.  Orthopnea unchanged.  Denies PND, weight gain, chest pain, palpitations, lightheadedness, dizziness, near syncopal/syncopal episodes      Last 24 hr care team notes reviewed.   ROS:  4 point ROS including Respiratory, CV, GI and , other than that noted in the HPI, is negative.     Medications: Reviewed in EPIC.     Physical Exam:   /56 (BP Location: Right arm)  Pulse 110  Temp 97.2  F (36.2  C) (Oral)  Resp 20  Wt (!) 169.6 kg (374 lb)  SpO2 91%  BMI 50.72 kg/m2  GENERAL: Alert and oriented times three. Comfortable.  No acute distress.  HEENT: EOM's conjugate. Mucous membranes moist and  without lesions.  NECK: Supple and without lymphadenopathy. JVD mid neck.  No carotid bruits.  CV: Rhythm tachycardic, S1S2 present without murmur, rub, or gallop.   RESPIRATORY: Respirations regular, even, and unlabored. No accessory muscle use. Lungs CTA throughout.   GI: Softer, still distended with normoactive bowel sounds present in all quadrants. No tenderness, rebound, guarding. Hepatomegaly difficult to assess secondary to body habitus.  EXTREMITIES: Extremites warm  to the touch.  No peripheral edema. 2+ bilateral pedal pulses. No clubbing.  NEUROLOGIC: Alert and orientated x 3. CN II-XII grossly intact. No focal deficits.   MUSCULOSKELETAL: No joint swelling or tenderness. No acute deformities.  SKIN: Wound on anterior portion of left leg approximately 1 inch or so in diameter.  Legs weeping bilaterally and are josef with chronic stasis dermatitis secondary to chronic heart failure. 3+ pitting edema with tenderness with mild palpation.  No jaundice. No rashes or cyanosis.    Data:  CMP  Recent Labs  Lab 01/07/18  0114 01/06/18  1733 01/06/18  0728 01/06/18  0052 01/05/18  1818 01/05/18  1109    137 139 140 140 140   POTASSIUM 3.2* 3.2* 3.4 3.4 3.7 3.3*   CHLORIDE 96 96 102 101 102 101   CO2 32 32 28 32 30 32   ANIONGAP 10 9 10 7 8 6   * 110* 97 96 115* 103*   BUN 11 9 8 9 9 10   CR 0.86 0.82 0.79 0.79 0.78 0.84   GFRESTIMATED 88 >90 >90 >90 >90 >90   GFRESTBLACK >90 >90 >90 >90 >90 >90   CHERI 9.0 9.1 8.9 8.8 9.2 9.4   MAG  --  2.1 2.0  --  1.9 2.0   PHOS  --  4.6* 4.0  --  3.7  --    PROTTOTAL  --   --   --   --   --  7.6   ALBUMIN  --   --   --   --   --  3.1*   BILITOTAL  --   --   --   --   --  1.1   ALKPHOS  --   --   --   --   --  87   AST  --   --   --   --   --  16   ALT  --   --   --   --   --  16     CBC  Recent Labs  Lab 01/06/18  0728 01/05/18  1109   WBC 7.2 6.5   RBC 4.31* 4.45   HGB 12.7* 13.4   HCT 40.8 42.5   MCV 95 96   MCH 29.5 30.1   MCHC 31.1* 31.5   RDW 15.7* 15.8*   PLT  202 182     INR  Recent Labs  Lab 01/06/18  0728 01/05/18  1109   INR 1.92* 2.01*       Patient seen and discussed with Dr. Linares.      Azeb KIRK, CNP  Cards II  Pager [phone]    1/7/2018

## 2018-01-08 ENCOUNTER — TELEPHONE (OUTPATIENT)
Dept: CARE COORDINATION | Facility: CLINIC | Age: 68
End: 2018-01-08

## 2018-01-08 ENCOUNTER — APPOINTMENT (OUTPATIENT)
Dept: OCCUPATIONAL THERAPY | Facility: CLINIC | Age: 68
DRG: 292 | End: 2018-01-08
Payer: COMMERCIAL

## 2018-01-08 LAB
ANION GAP SERPL CALCULATED.3IONS-SCNC: 5 MMOL/L (ref 3–14)
ANION GAP SERPL CALCULATED.3IONS-SCNC: 8 MMOL/L (ref 3–14)
BUN SERPL-MCNC: 12 MG/DL (ref 7–30)
BUN SERPL-MCNC: 14 MG/DL (ref 7–30)
CALCIUM SERPL-MCNC: 8.6 MG/DL (ref 8.5–10.1)
CALCIUM SERPL-MCNC: 9.2 MG/DL (ref 8.5–10.1)
CHLORIDE SERPL-SCNC: 93 MMOL/L (ref 94–109)
CHLORIDE SERPL-SCNC: 94 MMOL/L (ref 94–109)
CO2 SERPL-SCNC: 32 MMOL/L (ref 20–32)
CO2 SERPL-SCNC: 38 MMOL/L (ref 20–32)
CREAT SERPL-MCNC: 0.84 MG/DL (ref 0.66–1.25)
CREAT SERPL-MCNC: 1.02 MG/DL (ref 0.66–1.25)
GFR SERPL CREATININE-BSD FRML MDRD: 73 ML/MIN/1.7M2
GFR SERPL CREATININE-BSD FRML MDRD: >90 ML/MIN/1.7M2
GLUCOSE BLDC GLUCOMTR-MCNC: 105 MG/DL (ref 70–99)
GLUCOSE BLDC GLUCOMTR-MCNC: 110 MG/DL (ref 70–99)
GLUCOSE BLDC GLUCOMTR-MCNC: 112 MG/DL (ref 70–99)
GLUCOSE BLDC GLUCOMTR-MCNC: 118 MG/DL (ref 70–99)
GLUCOSE SERPL-MCNC: 123 MG/DL (ref 70–99)
GLUCOSE SERPL-MCNC: 96 MG/DL (ref 70–99)
INR PPP: 2.24 (ref 0.86–1.14)
INTERPRETATION ECG - MUSE: NORMAL
MAGNESIUM SERPL-MCNC: 2.2 MG/DL (ref 1.6–2.3)
MAGNESIUM SERPL-MCNC: 2.3 MG/DL (ref 1.6–2.3)
PHOSPHATE SERPL-MCNC: 3.4 MG/DL (ref 2.5–4.5)
POTASSIUM SERPL-SCNC: 3.5 MMOL/L (ref 3.4–5.3)
POTASSIUM SERPL-SCNC: 4.4 MMOL/L (ref 3.4–5.3)
SODIUM SERPL-SCNC: 134 MMOL/L (ref 133–144)
SODIUM SERPL-SCNC: 136 MMOL/L (ref 133–144)

## 2018-01-08 PROCEDURE — 25000132 ZZH RX MED GY IP 250 OP 250 PS 637: Performed by: FAMILY MEDICINE

## 2018-01-08 PROCEDURE — 80048 BASIC METABOLIC PNL TOTAL CA: CPT | Performed by: FAMILY MEDICINE

## 2018-01-08 PROCEDURE — 85610 PROTHROMBIN TIME: CPT | Performed by: FAMILY MEDICINE

## 2018-01-08 PROCEDURE — 84100 ASSAY OF PHOSPHORUS: CPT | Performed by: FAMILY MEDICINE

## 2018-01-08 PROCEDURE — 83735 ASSAY OF MAGNESIUM: CPT | Performed by: FAMILY MEDICINE

## 2018-01-08 PROCEDURE — 00000146 ZZHCL STATISTIC GLUCOSE BY METER IP

## 2018-01-08 PROCEDURE — 97140 MANUAL THERAPY 1/> REGIONS: CPT | Mod: GO | Performed by: OCCUPATIONAL THERAPIST

## 2018-01-08 PROCEDURE — 12000008 ZZH R&B INTERMEDIATE UMMC

## 2018-01-08 PROCEDURE — 40000141 ZZH STATISTIC PERIPHERAL IV START W/O US GUIDANCE

## 2018-01-08 PROCEDURE — 40000133 ZZH STATISTIC OT WARD VISIT: Performed by: OCCUPATIONAL THERAPIST

## 2018-01-08 PROCEDURE — 25000132 ZZH RX MED GY IP 250 OP 250 PS 637: Performed by: NURSE PRACTITIONER

## 2018-01-08 PROCEDURE — 36415 COLL VENOUS BLD VENIPUNCTURE: CPT | Performed by: FAMILY MEDICINE

## 2018-01-08 RX ORDER — LEVOTHYROXINE SODIUM 175 UG/1
175 TABLET ORAL DAILY
Qty: 30 TABLET | Refills: 0 | Status: SHIPPED | OUTPATIENT
Start: 2018-01-09 | End: 2018-03-27

## 2018-01-08 RX ORDER — LISINOPRIL 2.5 MG/1
2.5 TABLET ORAL
Status: DISCONTINUED | OUTPATIENT
Start: 2018-01-08 | End: 2018-01-09

## 2018-01-08 RX ORDER — SULFAMETHOXAZOLE/TRIMETHOPRIM 800-160 MG
1 TABLET ORAL 2 TIMES DAILY
Qty: 2 TABLET | Refills: 0 | Status: SHIPPED | OUTPATIENT
Start: 2018-01-10 | End: 2018-01-10

## 2018-01-08 RX ORDER — POTASSIUM CHLORIDE 1500 MG/1
40 TABLET, EXTENDED RELEASE ORAL DAILY
Qty: 30 TABLET | Refills: 0 | Status: SHIPPED | OUTPATIENT
Start: 2018-01-08 | End: 2018-01-15

## 2018-01-08 RX ORDER — WARFARIN SODIUM 7.5 MG/1
15 TABLET ORAL
Status: COMPLETED | OUTPATIENT
Start: 2018-01-08 | End: 2018-01-08

## 2018-01-08 RX ORDER — FUROSEMIDE 80 MG
80 TABLET ORAL 2 TIMES DAILY
Qty: 60 TABLET | Refills: 0 | Status: SHIPPED | OUTPATIENT
Start: 2018-01-08 | End: 2018-01-10

## 2018-01-08 RX ORDER — LACTOBACILLUS RHAMNOSUS GG 10B CELL
1 CAPSULE ORAL 2 TIMES DAILY
Qty: 2 CAPSULE | Refills: 0 | Status: SHIPPED | OUTPATIENT
Start: 2018-01-10 | End: 2018-01-10

## 2018-01-08 RX ADMIN — MULTIPLE VITAMINS W/ MINERALS TAB 1 TABLET: TAB at 09:10

## 2018-01-08 RX ADMIN — METOPROLOL SUCCINATE 100 MG: 50 TABLET, EXTENDED RELEASE ORAL at 19:22

## 2018-01-08 RX ADMIN — SENNOSIDES AND DOCUSATE SODIUM 1 TABLET: 8.6; 5 TABLET ORAL at 14:06

## 2018-01-08 RX ADMIN — ATORVASTATIN CALCIUM 40 MG: 40 TABLET, FILM COATED ORAL at 09:10

## 2018-01-08 RX ADMIN — SULFAMETHOXAZOLE AND TRIMETHOPRIM 1 TABLET: 800; 160 TABLET ORAL at 19:23

## 2018-01-08 RX ADMIN — ASPIRIN 81 MG: 81 TABLET, COATED ORAL at 09:11

## 2018-01-08 RX ADMIN — Medication 1 CAPSULE: at 09:11

## 2018-01-08 RX ADMIN — SULFAMETHOXAZOLE AND TRIMETHOPRIM 1 TABLET: 800; 160 TABLET ORAL at 11:29

## 2018-01-08 RX ADMIN — TAMSULOSIN HYDROCHLORIDE 0.4 MG: 0.4 CAPSULE ORAL at 19:23

## 2018-01-08 RX ADMIN — POTASSIUM CHLORIDE 40 MEQ: 750 TABLET, EXTENDED RELEASE ORAL at 09:10

## 2018-01-08 RX ADMIN — SPIRONOLACTONE 25 MG: 25 TABLET ORAL at 19:23

## 2018-01-08 RX ADMIN — WARFARIN SODIUM 15 MG: 7.5 TABLET ORAL at 17:01

## 2018-01-08 RX ADMIN — LEVOTHYROXINE SODIUM 175 MCG: 100 TABLET ORAL at 09:10

## 2018-01-08 RX ADMIN — Medication 1 G: at 09:11

## 2018-01-08 RX ADMIN — POTASSIUM CHLORIDE 40 MEQ: 750 TABLET, EXTENDED RELEASE ORAL at 19:22

## 2018-01-08 RX ADMIN — SPIRONOLACTONE 25 MG: 25 TABLET ORAL at 09:11

## 2018-01-08 RX ADMIN — Medication 1 CAPSULE: at 19:23

## 2018-01-08 ASSESSMENT — ENCOUNTER SYMPTOMS
SHORTNESS OF BREATH: 0
PND: 0
SPUTUM PRODUCTION: 0
COUGH: 0
FEVER: 0
NAUSEA: 0
WEAKNESS: 0
VOMITING: 0
ORTHOPNEA: 0
ABDOMINAL PAIN: 0

## 2018-01-08 NOTE — PLAN OF CARE
Problem: Patient Care Overview  Goal: Plan of Care/Patient Progress Review  Edema 5A: Compression wraps removed and wound dressings/skin cares performed per nursing. Firm 1+/2+ pitting distally. Redness remaining within drawn boarders. Reapplication of GCB from MTPs to knee creases. Remove wraps if causing pain/numbness or become soiled.

## 2018-01-08 NOTE — PROGRESS NOTES
[facility] - Inpatient daily progress note    Date of admission: 1/5/2018  Date of service: January 8, 2018       Assessment and Plan:   Assessment:   This is a 67 year old male with history of HFrEF, chronic venous stasis, atrial fibrillation, Type-2-diabetes, PVD, morbid obesity, and depression admitted for worsening lower extremity edema and volume overload with some concern for lower extremity cellulitis.  Patient Active Problem List   Diagnosis     Atrial fibrillation (H)     Hypothyroidism     Basal cell carcinoma of skin of face     CTS (carpal tunnel syndrome)     Myopia     Plantar fasciitis     Presbyopia     Regular astigmatism     Neoplasm of uncertain behavior of skin     Venous stasis     Type 2 diabetes mellitus with hyperglycemia, without long-term current use of insulin (H)     Morbid obesity (H)     Depression with anxiety     Fall from chair, initial encounter     Hyperlipidemia LDL goal <100     Fall     SOB (shortness of breath)     BiPAP (biphasic positive airway pressure) dependence     Lymphedema     Chronic systolic congestive heart failure (H)     Essential hypertension with goal blood pressure less than 140/90     Urinary retention     Hypokalemia     Constipation, unspecified constipation type     Cellulitis      Plan:    ## Concern for bilateral LE cellulitis:   ## Chronic Venous stasis with dermatitis: Improved   Improved venous stasis after aggressive diuresis. He remains hemodynamically stable.   - Continue Bactrim 800-160 mg BID (Day-3). Plan for five days total .   - Continue lactobacillus BID   - Lymphedema/Physical and occupational therapy consults  - Continue to monitor vitals closely      ## Symptomatic HFrEF:   ## Pulmonary congestion:   Improved dyspnea, orthopnea, paroxysmal dyspnea, and edema after agressive diuresis. Cardiology following and recommendations appreciated.   - Lasix gtt decreased to 5 mg/hr   - Continue to  monitor volume status. Transition to orals today   - Continue Potassium replacement BID   - BMP and electrolytes BID   - Daily weights and strict I/O   - Continue to monitor vitals closely   - Continue CPAP with home settings   - Continue Metoprolol 100 mg daily, and Spironolactone 25 mg daily.     ## Atrial Fibrillation: Stable   CHADS-VaSC 5.  Has been intermittently having heart rates as high as 140 over the past 12 hours.  These have not been sustained.  This was discussed with cardiology who recommended continuing diuresis and not addressing the heart rate at this time.  - Continue PTA Metoprolol 100 mg daily   - Pharmacy to dose Warfarin   - Daily INR   - Telemetry Monitoring considering tachycardia     ## Obesity Hypoventilation syndrome:  ## Sleep Apnea:  - Continue diuresis per above   - Continue CPAP with home settings     ## Coronary Artery Disease:   ## PAD:   ## Essential Hypertension:   - Continue PTA Aspirin 81 mg daily   - Continue high intensity Lipitor 40 mg daily   - Continue PTA BP medications per above      ## Hypothyroidism:    TSH (11/2017) elevated at 4.89, repeat today was 6.8.   - Increase PTA Levothyroxine from 150 mcg daily to 175 mcg   - recheck TSH as outpatient in 6 weeks      ## DM-Type-2: Controlled   A1c (11/2017): 6.3  - Hold PTA metformin 500 mg BID   - MSSI      ## BPH: Chronic, stable   - Continue PTA Flomax 0.4 mg daily      Daily cares -   F: PO   E: stable  N: Low fat diet   Lines: PIV,   Activity:-Up as tolerated  CODE:Full Code  Prophylaxis: Warfarin   PCP communication:  - Matthias Sanchez  - Contacted at admission: No  - Concerns or updates: None   Dispo: Expected discharge date: 2-3 days pending clinical improvement             Interval History:   Patient was seen and evaluated this morning. He has no complaints. Vitals signs and nurses notes reviewed. Medical management including labs, vitals and care plan were discussed with patient. Patient would be update during  rounds with attending physician with additional management and plan.             Review of Systems:   Review of Systems   Constitutional: Negative for fever and malaise/fatigue.   Respiratory: Negative for cough, sputum production and shortness of breath.    Cardiovascular: Positive for leg swelling (Improved). Negative for chest pain, orthopnea and PND.   Gastrointestinal: Negative for abdominal pain, nausea and vomiting.   Neurological: Negative for weakness.          Physical Exam (Resident / Clinician):   Vitals were reviewed  Temp: 96.9  F (36.1  C) Temp src: Oral BP: 107/61 Pulse: 102 Heart Rate: 106 Resp: 18 SpO2: 97 % O2 Device: Nasal cannula Oxygen Delivery: 2 LPM    Physical Exam   Constitutional: No distress.   HENT:   Head: Normocephalic and atraumatic.   Cardiovascular: Normal rate and normal heart sounds.    Pulmonary/Chest: Effort normal and breath sounds normal. No respiratory distress. He has no wheezes. He has no rales.   Abdominal: Soft. He exhibits distension. He exhibits no mass. There is no tenderness. There is no rebound and no guarding.   Musculoskeletal: Normal range of motion. He exhibits edema (Bilateral 2+ edema. Legs dressing CDI).   Skin: He is not diaphoretic.     Intake/Output Summary (Last 24 hours) at 01/08/18 0824  Last data filed at 01/08/18 0750   Gross per 24 hour   Intake             1080 ml   Output             5225 ml   Net            -4145 ml           Data:   ROUTINE LABS (Last four results)  CMP    Recent Labs  Lab 01/08/18  0556 01/07/18  1845 01/07/18  0114 01/06/18  1733 01/06/18  0728  01/05/18  1818 01/05/18  1109    135 137 137 139  < > 140 140   POTASSIUM 3.5 3.5 3.2* 3.2* 3.4  < > 3.7 3.3*   CHLORIDE 94 93* 96 96 102  < > 102 101   CO2 32 33* 32 32 28  < > 30 32   ANIONGAP 8 9 10 9 10  < > 8 6   GLC 96 110* 100* 110* 97  < > 115* 103*   BUN 12 10 11 9 8  < > 9 10   CR 0.84 0.92 0.86 0.82 0.79  < > 0.78 0.84   GFRESTIMATED >90 82 88 >90 >90  < > >90 >90    GFRESTBLACK >90 >90 >90 >90 >90  < > >90 >90   CHERI 8.6 9.0 9.0 9.1 8.9  < > 9.2 9.4   MAG  --  2.2  --  2.1 2.0  --  1.9 2.0   PHOS  --  3.8  --  4.6* 4.0  --  3.7  --    PROTTOTAL  --   --   --   --   --   --   --  7.6   ALBUMIN  --   --   --   --   --   --   --  3.1*   BILITOTAL  --   --   --   --   --   --   --  1.1   ALKPHOS  --   --   --   --   --   --   --  87   AST  --   --   --   --   --   --   --  16   ALT  --   --   --   --   --   --   --  16   < > = values in this interval not displayed.  CBC    Recent Labs  Lab 01/07/18  0906 01/06/18  0728 01/05/18  1109   WBC 7.9 7.2 6.5   RBC 4.71 4.31* 4.45   HGB 14.1 12.7* 13.4   HCT 44.9 40.8 42.5   MCV 95 95 96   MCH 29.9 29.5 30.1   MCHC 31.4* 31.1* 31.5   RDW 15.7* 15.7* 15.8*    202 182     INR    Recent Labs  Lab 01/08/18  0556 01/07/18  0906 01/06/18  0728 01/05/18  1109   INR 2.24* 1.89* 1.92* 2.01*     CRP    Recent Labs  Lab 01/06/18  0728 01/05/18  1109   CRP 8.9* 6.2       Blood culture:  Invalid input(s): BC   Urine culture:  No results for input(s): URC in the last 168 hours.  All cultures:    Recent Labs  Lab 01/05/18  1822 01/05/18  1818   CULT No growth after 3 days No growth after 3 days         Medications:     Current Facility-Administered Medications   Medication     warfarin (COUMADIN) tablet 15 mg     potassium chloride SA (K-DUR/KLOR-CON M) CR tablet 40 mEq     pneumococcal vaccine (PNEUMOVAX 23-lisa) injection 0.5 mL     furosemide (LASIX) 500 mg/50mL infusion ADULT MAX CONC     lisinopril (PRINIVIL/Zestril) tablet 2.5 mg     levothyroxine (SYNTHROID/LEVOTHROID) tablet 175 mcg     aspirin EC EC tablet 81 mg     atorvastatin (LIPITOR) tablet 40 mg     ipratropium - albuterol 0.5 mg/2.5 mg/3 mL (DUONEB) neb solution 3 mL     metoprolol (TOPROL-XL) 24 hr tablet 100 mg     multivitamin, therapeutic with minerals (THERA-VIT-M) tablet 1 tablet     fish oil-omega-3 fatty acids capsule 1 g     spironolactone (ALDACTONE) tablet 25 mg      tamsulosin (FLOMAX) capsule 0.4 mg     naloxone (NARCAN) injection 0.1-0.4 mg     lidocaine 1 % 1 mL     lidocaine (LMX4) kit     sodium chloride (PF) 0.9% PF flush 3 mL     sodium chloride (PF) 0.9% PF flush 3 mL     Patient is already receiving anticoagulation with heparin, enoxaparin (LOVENOX), warfarin (COUMADIN)  or other anticoagulant medication     potassium chloride SA (K-DUR/KLOR-CON M) CR tablet 20-40 mEq     potassium chloride (KLOR-CON) Packet 20-40 mEq     potassium chloride 10 mEq in 100 mL sterile water intermittent infusion (premix)     potassium chloride 10 mEq in 100 mL intermittent infusion with 10 mg lidocaine     potassium chloride 20 mEq in 50 mL intermittent infusion     acetaminophen (TYLENOL) tablet 650 mg     ibuprofen (ADVIL/MOTRIN) tablet 600 mg     senna-docusate (SENOKOT-S;PERICOLACE) 8.6-50 MG per tablet 1 tablet    Or     senna-docusate (SENOKOT-S;PERICOLACE) 8.6-50 MG per tablet 2 tablet     polyethylene glycol (MIRALAX/GLYCOLAX) Packet 17 g     ondansetron (ZOFRAN-ODT) ODT tab 4 mg    Or     ondansetron (ZOFRAN) injection 4 mg     prochlorperazine (COMPAZINE) injection 5 mg    Or     prochlorperazine (COMPAZINE) tablet 5 mg    Or     prochlorperazine (COMPAZINE) Suppository 12.5 mg     glucose 40 % gel 15-30 g    Or     dextrose 50 % injection 25-50 mL    Or     glucagon injection 1 mg     insulin aspart (NovoLOG) inj (RAPID ACTING)     insulin aspart (NovoLOG) inj (RAPID ACTING)     sulfamethoxazole-trimethoprim (BACTRIM DS/SEPTRA DS) 800-160 MG per tablet 1 tablet     lactobacillus rhamnosus (GG) (CULTURELL) capsule 1 capsule     Warfarin Therapy Reminder (Check START DATE - warfarin may be starting in the FUTURE)       Caring Physician: Jerry Miller MD  [facility]  Pager Contact: see Physician sticky note

## 2018-01-08 NOTE — PLAN OF CARE
Problem: Patient Care Overview  Goal: Plan of Care/Patient Progress Review  Outcome: Improving  [id number]:    Reason for admission: cellulitis  Vitals: VSS ex soft BP and intermittent tachycardic on 2 L O2 NC  Activity: up assist x1 plus walker  Pain: no c/o pain  Neuro: WNL  Cardiac: a fib, on tele, on warfarin  Respiratory: WNL ex diminished on 2 L O2  GI/: obese, rounded abd - last BM 1/3/2018  Diet: 2 gm Na plus 1.5 L FR, good appetite  Lines: R PIV running Lasix gtt @ 5 mg/hr (0.5 mL/hr)  Wounds: bilateral lower extremity cellulitis with blisters, drsg CDI  Labs/imaging: INR 2.24    New changes this shift: wound drsg changed, lymph wraps applied. Lasix gtt rate decreased (pt down nearly 60 lbs since admit). PRN Senna given x1 for no stool since 1/3    Plan: dc 2-3 days pending clinical improvement    Continue to monitor and follow POC

## 2018-01-08 NOTE — DISCHARGE SUMMARY
[facility]  Discharge Summary    Clarke Moreira MRN# [id number]   Age: 67 year old YOB: 1950     Date of Admission:  1/5/2018  Date of Discharge:  1/10/2018  Admitting Physician:  Devorah Torres DO  Discharge Physician:  Dhiraj Varela MD  Contact: [phone]  Discharging Service:  [facility]     Primary Provider:   Matthias Sanchez  [address] [address]  [phone]          Discharge Disposition:   Discharged to home           Condition on Discharge:   Discharge condition: Stable   Code status on discharge: Full          Admission Diagnoses:   Chronic systolic congestive heart failure (H) [I50.22]  Cellulitis of right Lower extremity [L03.113]  Cellulitis of left Lower extremity [L03.114]  Stasis dermatitis of both legs [I87.2]  Hypervolemia, unspecified hypervolemia type [E87.70]         Principle Discharge Problem:   ## Hypervolemia: 2/2 chronic systolic congestive heart failure   ## Bilateral lower extremities Cellulitis   ## acute on chronic systolic heart failure        Additional Discharge Problems:     Patient Active Problem List    Diagnosis Date Noted     Cellulitis 01/05/2018     Priority: Medium     BiPAP (biphasic positive airway pressure) dependence 11/10/2017     Priority: Medium     Lymphedema 11/10/2017     Priority: Medium     Chronic systolic congestive heart failure (H) 11/10/2017     Priority: Medium     Last Echo 2008 with EF 40-45%, but on 09/24/17 recheck was described as normal. There might be a diastolic component and most suitable be HFpEF. He is on lisinopril, lasix, Metoprolol at home.       Essential hypertension with goal blood pressure less than 140/90 11/10/2017     Priority: Medium     Urinary retention 11/10/2017     Priority: Medium     Hypokalemia 11/10/2017     Priority: Medium     Constipation, unspecified constipation type 11/10/2017     Priority:  Medium     SOB (shortness of breath) 09/28/2017     Priority: Medium     Fall from chair, initial encounter 09/23/2017     Priority: Medium     Hyperlipidemia LDL goal <100 09/23/2017     Priority: Medium     Fall 09/23/2017     Priority: Medium     Depression with anxiety 09/14/2016     Priority: Medium     Morbid obesity (H) 08/03/2014     Priority: Medium     Type 2 diabetes mellitus with hyperglycemia, without long-term current use of insulin (H) 03/14/2013     Priority: Medium     Diagnosis 3/13 - A1c=6.7       Atrial fibrillation (H) 03/13/2013     Priority: Medium     Hypothyroidism 03/13/2013     Priority: Medium     Basal cell carcinoma of skin of face 03/13/2013     Priority: Medium     CTS (carpal tunnel syndrome) 03/13/2013     Priority: Medium     Myopia 03/13/2013     Priority: Medium     Plantar fasciitis 03/13/2013     Priority: Medium     Presbyopia 03/13/2013     Priority: Medium     Regular astigmatism 03/13/2013     Priority: Medium     Neoplasm of uncertain behavior of skin 03/13/2013     Priority: Medium     Venous stasis 03/13/2013     Priority: Medium            Medications Prior to Admission:       No current facility-administered medications on file prior to encounter.   Current Outpatient Prescriptions on File Prior to Encounter:  spironolactone (ALDACTONE) 25 MG tablet Take 1 tablet (25 mg) by mouth 2 times daily   warfarin (COUMADIN) 5 MG tablet Take 2.5 tablets (12.5 mg) by mouth daily   potassium chloride SA (K-DUR/KLOR-CON M) 20 MEQ CR tablet Take 2 tablets (40 mEq) by mouth daily   metFORMIN (GLUCOPHAGE) 500 MG tablet Take 1 tablet (500 mg) by mouth 2 times daily (with meals)   metoprolol (TOPROL-XL) 100 MG 24 hr tablet Take 1 tablet (100 mg) by mouth daily   multivitamin, therapeutic with minerals (MULTI-VITAMIN) TABS tablet Take 1 tablet by mouth daily   omega 3 1000 MG CAPS Take 1 g by mouth daily   polyethylene glycol (MIRALAX/GLYCOLAX) Packet Take 17 g by mouth daily as needed  for constipation   tamsulosin (FLOMAX) 0.4 MG capsule Take 1 capsule (0.4 mg) by mouth daily   aspirin 81 MG tablet Take 1 tablet (81 mg) by mouth daily   atorvastatin (LIPITOR) 40 MG tablet Take 1 tablet (40 mg) by mouth daily   senna-docusate (SENOKOT-S;PERICOLACE) 8.6-50 MG per tablet Take 1-2 tablets by mouth 2 times daily as needed for constipation   [DISCONTINUED] lisinopril (PRINIVIL/ZESTRIL) 5 MG tablet Take 0.5 tablets (2.5 mg) by mouth daily   order for DME Equipment being ordered: Other: Velcro Compression stockings. Treatment Diagnosis: bilateral lymphedema.   ipratropium - albuterol 0.5 mg/2.5 mg/3 mL (DUONEB) 0.5-2.5 (3) MG/3ML neb solution Take 1 vial (3 mLs) by nebulization every 4 hours as needed for wheezing (Patient not taking: Reported on 11/27/2017)            Discharge Medications:        Review of your medicines      START taking       Dose / Directions    lactobacillus rhamnosus (GG) capsule        Dose:  1 capsule   Take 1 capsule by mouth 2 times daily   Quantity:  14 capsule   Refills:  0       sulfamethoxazole-trimethoprim 800-160 MG per tablet   Commonly known as:  BACTRIM DS/SEPTRA DS   Indication:  Pyelonephritis, Infection of the Skin and/or Related Soft Tissue        Dose:  1 tablet   Take 1 tablet by mouth 2 times daily for 1 dose   Quantity:  1 tablet   Refills:  0         CONTINUE these medicines which may have CHANGED, or have new prescriptions. If we are uncertain of the size of tablets/capsules you have at home, strength may be listed as something that might have changed.       Dose / Directions    furosemide 20 MG tablet   Commonly known as:  LASIX   This may have changed:  how much to take   Used for:  Chronic congestive heart failure, unspecified congestive heart failure type (H)        Dose:  60 mg   Take 3 tablets (60 mg) by mouth 2 times daily   Quantity:  60 tablet   Refills:  0       levothyroxine 175 MCG tablet   Commonly known as:  SYNTHROID/LEVOTHROID   This may have  changed:    - medication strength  - how much to take   Used for:  Hypothyroidism, unspecified type        Dose:  175 mcg   Take 1 tablet (175 mcg) by mouth daily   Quantity:  30 tablet   Refills:  0       lisinopril 2.5 MG tablet   Commonly known as:  PRINIVIL/Zestril   This may have changed:    - medication strength  - when to take this   Used for:  Essential hypertension with goal blood pressure less than 140/90, Acute diastolic congestive heart failure (H), Chronic congestive heart failure, unspecified congestive heart failure type (H)        Dose:  2.5 mg   Take 1 tablet (2.5 mg) by mouth 2 times daily   Quantity:  30 tablet   Refills:  0       * potassium chloride SA 20 MEQ CR tablet   Commonly known as:  K-DUR/KLOR-CON M   Indication:  hypokalemia   This may have changed:  Another medication with the same name was added. Make sure you understand how and when to take each.   Used for:  Hypokalemia        Dose:  40 mEq   Take 2 tablets (40 mEq) by mouth daily   Quantity:  60 tablet   Refills:  11       * potassium chloride SA 20 MEQ CR tablet   Commonly known as:  K-DUR/KLOR-CON M   This may have changed:  You were already taking a medication with the same name, and this prescription was added. Make sure you understand how and when to take each.   Used for:  Chronic systolic congestive heart failure (H)        Dose:  40 mEq   Take 2 tablets (40 mEq) by mouth daily   Quantity:  30 tablet   Refills:  0       * warfarin 5 MG tablet   Commonly known as:  COUMADIN   This may have changed:  Another medication with the same name was added. Make sure you understand how and when to take each.   Used for:  Persistent atrial fibrillation (H)        Dose:  12.5 mg   Take 2.5 tablets (12.5 mg) by mouth daily   Quantity:  75 tablet   Refills:  11       * warfarin 10 MG tablet   Commonly known as:  COUMADIN   This may have changed:  You were already taking a medication with the same name, and this prescription was added. Make  sure you understand how and when to take each.   Used for:  Chronic atrial fibrillation (H)        Dose:  10 mg   Take 1 tablet (10 mg) by mouth once for 1 dose   Quantity:  1 tablet   Refills:  0       * Notice:  This list has 4 medication(s) that are the same as other medications prescribed for you. Read the directions carefully, and ask your doctor or other care provider to review them with you.      CONTINUE these medicines which have NOT CHANGED       Dose / Directions    aspirin 81 MG tablet   Used for:  Essential hypertension        Dose:  81 mg   Take 1 tablet (81 mg) by mouth daily   Quantity:  30 tablet   Refills:  0       atorvastatin 40 MG tablet   Commonly known as:  LIPITOR   Used for:  Type 2 diabetes mellitus without complication, without long-term current use of insulin (H)        Dose:  40 mg   Take 1 tablet (40 mg) by mouth daily   Quantity:  30 tablet   Refills:  0       ipratropium - albuterol 0.5 mg/2.5 mg/3 mL 0.5-2.5 (3) MG/3ML neb solution   Commonly known as:  DUONEB   Used for:  SOB (shortness of breath)        Dose:  3 mL   Take 1 vial (3 mLs) by nebulization every 4 hours as needed for wheezing   Quantity:  360 mL   Refills:  3       metFORMIN 500 MG tablet   Commonly known as:  GLUCOPHAGE   Used for:  Type 2 diabetes mellitus without complication, without long-term current use of insulin (H)        Dose:  500 mg   Take 1 tablet (500 mg) by mouth 2 times daily (with meals)   Quantity:  60 tablet   Refills:  11       metoprolol 100 MG 24 hr tablet   Commonly known as:  TOPROL-XL   Used for:  Essential hypertension with goal blood pressure less than 140/90        Dose:  100 mg   Take 1 tablet (100 mg) by mouth daily   Quantity:  30 tablet   Refills:  11       multivitamin, therapeutic with minerals Tabs tablet   Used for:  Type 2 diabetes mellitus without complication, without long-term current use of insulin (H)        Dose:  1 tablet   Take 1 tablet by mouth daily   Quantity:  30 each    Refills:  11       omega 3 1000 MG Caps   Used for:  Hyperlipidemia LDL goal <100        Dose:  1 g   Take 1 g by mouth daily   Quantity:  30 capsule   Refills:  11       order for DME   Used for:  Lymphedema of extremity        Equipment being ordered: Other: Velcro Compression stockings.  Treatment Diagnosis: bilateral lymphedema.   Quantity:  2 each   Refills:  0       polyethylene glycol Packet   Commonly known as:  MIRALAX/GLYCOLAX   Used for:  Constipation, unspecified constipation type        Dose:  17 g   Take 17 g by mouth daily as needed for constipation   Quantity:  15 packet   Refills:  11       senna-docusate 8.6-50 MG per tablet   Commonly known as:  SENOKOT-S;PERICOLACE   Used for:  Constipation, unspecified constipation type        Dose:  1-2 tablet   Take 1-2 tablets by mouth 2 times daily as needed for constipation   Quantity:  60 tablet   Refills:  11       spironolactone 25 MG tablet   Commonly known as:  ALDACTONE   Used for:  Chronic systolic congestive heart failure (H)        Dose:  25 mg   Take 1 tablet (25 mg) by mouth 2 times daily   Quantity:  60 tablet   Refills:  3       tamsulosin 0.4 MG capsule   Commonly known as:  FLOMAX   Used for:  Urinary retention        Dose:  0.4 mg   Take 1 capsule (0.4 mg) by mouth daily   Quantity:  30 capsule   Refills:  11         STOP taking          cephALEXin 500 MG capsule   Commonly known as:  KEFLEX                Where to get your medicines      These medications were sent to [facility] - [place], [place] - [address] [address]     Phone:  [phone]      furosemide 20 MG tablet     levothyroxine 175 MCG tablet     lisinopril 2.5 MG tablet     potassium chloride SA 20 MEQ CR tablet     sulfamethoxazole-trimethoprim 800-160 MG per tablet     warfarin 10 MG tablet         Some of these will need a paper prescription and others can be bought over the counter. Ask your nurse if you have  questions.     Bring a paper prescription for each of these medications      lactobacillus rhamnosus (GG) capsule                Discharge Instructions and Follow-Up:   Discharge diet: Regular   Discharge activity: Activity as tolerated   Discharge follow-up: Follow up with primary care provider with in seven days of discharge for hospital follow up and labs.   Please follow up with Cardiology outpatient.    Lines and drains: None    Wound care: None          Brief Admission History and Evaluation:   This is a 67 year old male with a past medical history significant for HFrEF, chronic venous stasis, atrial fibrillation, DM-Type-2, PVD, morbid obesity, and depression who presented to the hospital for concerns of lower extremities cellulitis.      Patient has a history of chronic stasis edema with skin changes. He reported that he was told to come into the hospital by his Nurse Manager due to concerns of cellulitis. He was seen by his Lymphedema nurse a few days ago and was noted to have worsening lower extremities erythema with bullae formation. He denies any fever, chills, nausea, and vomiting.       Clarke has a history of systolic heart failure, obesity and persistent fluid overload despite diuresis. He reports or worsening shortness of breath, orthopnea, paroxysmal nocturnal dyspnea, abdominal distention and worsening lower extremities edema. He was recently seen by his Cardiologist who recommended right heart catheterization to evaluate for various components resulting to his worsening volume overload.      During my encounter with patient in the ED he was hemodynamically stable and on room air. Infectious and inflammatory work-up ordered by the ED provider were all unremarkable. Chest xray was consistent with pulmonary congestion. He was transferred to the inpatient floor for further management of cellulitis and volume overload.        Hospital Course/Discharge Plan by Problem:   ## Concern for bilateral LE  cellulitis:   ## Chronic Venous stasis with dermatitis: Improved   Improved venous stasis after aggressive diuresis; remained hemodynamically stable. Lymphedema team, Physical therapy, and occupational therapy assessed while inpt.  Placed on 5 day course of Bactrim 800-160 mg BID, with last dose evening after discharge. Will continue lactobacillus BID for 3 days.       ## acute on chronic HFrEF:   ## Pulmonary congestion:   Improved dyspnea, orthopnea, paroxysmal dyspnea, and edema after agressive diuresis. Down 20 L from admit. Cardiology was consulted and helped guide treatment. Initial Lasix gtt was changed to PO 60 mg BID. Noted need for right heart cath and ischemic eval as outpatient. Continued Metoprolol  mg daily, and Spironolactone 25 mg daily, and started lisinopril 2.5mg PO BID. O2 Walk test showed need for home O2 of 2L and Order placed. Continue PTA Potassium replacement.   - Will follow up with [facility] as outpt  - Will have BMP, mag, phos drawn with INR check    ## Atrial Fibrillation: Stable   CHADS-VaSC 5. On tele durring admit; intermittently having heart rates as high as 140, not sustained. Continue PTA Metoprolol 100 mg daily. Pharmacy dosed warfarin inpt, and recs warfarin 10 mg TONIGHT ONLY, then continue PTA dose of 12.5 mg QD. INR goal of 2.0-3.0. INR assessed daily and came within range prior to d/c. Will have INR checked Fri.        ## Obesity Hypoventilation syndrome:  ## Sleep Apnea:   Improved with diuresis and continued CPAP with home settings      ## Coronary Artery Disease:   ## PAD:   ## Essential Hypertension:   PTA Aspirin 81 mg daily, high intensity Lipitor 40 mg daily, and BP medications per above were continued      ## Hypothyroidism:    TSH (11/2017) elevated at 4.89, repeat inpt was 6.8. Increased Levothyroxine during admit from 150 mcg daily to 175 mcg QD.   - Recommend recheck TSH as outpatient in 6 weeks       ## DM-Type-2: Controlled   A1c (11/2017): 6.3. Was on  MSSI while inpt.   - PTA metformin 500 mg BID was held while inpt and resumed upon D/C.         ## BPH: Chronic, stable   PTA Flomax 0.4 mg daily continued         Final Day of Progress before Discharge:         Interval History:   General:  feels better, appears to be improving and more alert   Vitals: blood pressure stable   Intake/Output: stable intake and output   Nutrition: regular diet as tolerated    Mental status: mental status unchanged, alert and oriented and responding appropriately   Respiratory: Stable, improving WOB on 2L O2   Cardiovascular no chest pain, no palpitations, blood pressure stable and heart rate stable   Renal: stable renal fuction   Neurologic: no focal deficits reported   Medications: Refer to discharge medications above    Interventions: No interventions performed since the last visit   Consults: Cardiology was consulted during this hospitalization.            Physical Exam:  Vitals were reviewed    GENERAL: healthy, alert and no distress  HEENT: Eyes grossly normal to inspection, extraocular movements - intact, nares normal, turbinates normal pink, no tonsillar exudate, no tonsillar hypertrophy, no posterior oropharynx erythema, TMs normal and grey bilaterally.  RESP: lungs clear to auscultation - no rales, no rhonchi, no wheezes  CV: regular rates and rhythm, normal S1 S2, no S3 or S4 and no murmur, no click or rub  ABD: soft, non distended, non tender, no guarding, no rebound.   MS: extremities- no gross deformities noted, no edema in lower extremities  SKIN: no suspicious lesions, no rashes in skin exposed  PSYCH: Alert and oriented times 3; speech- coherent , normal rate and volume; able to articulate logical thoughts, no tangential thoughts, affect- normal               Data:  All laboratory data reviewed  Results for orders placed or performed during the hospital encounter of 01/05/18   Chest  XR, 1 view portable    Narrative    XR CHEST PORT 1 VW  1/5/2018 2:10 PM      HISTORY:  Dyspnea;     COMPARISON: 10/4/2017    FINDINGS: Portable AP view of the chest. Hazy right basilar opacities.  No pneumothorax or or pleural effusion. Dilated pulmonary arteries.  Heart size is unchanged.      Impression    IMPRESSION: Hazy right basilar atelectasis, infection or aspiration.  Pulmonary vascular congestion.    I have personally reviewed the examination and initial interpretation  and I agree with the findings.    SOL CRAIG MD   US Lower Extremity Venous Duplex Bilateral    Narrative    EXAMINATION: US LOWER EXTREMITY VENOUS DUPLEX BILATERAL, 1/6/2018 1:26  PM     COMPARISON: None.    HISTORY: Concerns for DVT    TECHNIQUE:  Gray-scale evaluation with compression, spectral flow and  color Doppler assessment of the deep venous system of both legs from  groin to knee, and then at the ankles.    FINDINGS:  In the both lower extremities, the common femoral, femoral, popliteal  and posterior tibial veins demonstrate normal compressibility and  blood flow.      Impression    IMPRESSION:  No evidence of deep venous thrombosis in either lower extremity.    I have personally reviewed the examination and initial interpretation  and I agree with the findings.    ARCHIE HI MD ([place])   CBC with platelets differential   Result Value Ref Range    WBC 6.5 4.0 - 11.0 10e9/L    RBC Count 4.45 4.4 - 5.9 10e12/L    Hemoglobin 13.4 13.3 - 17.7 g/dL    Hematocrit 42.5 40.0 - 53.0 %    MCV 96 78 - 100 fl    MCH 30.1 26.5 - 33.0 pg    MCHC 31.5 31.5 - 36.5 g/dL    RDW 15.8 (H) 10.0 - 15.0 %    Platelet Count 182 150 - 450 10e9/L    Diff Method Automated Method     % Neutrophils 57.4 %    % Lymphocytes 28.0 %    % Monocytes 10.2 %    % Eosinophils 3.6 %    % Basophils 0.6 %    % Immature Granulocytes 0.2 %    Nucleated RBCs 0 0 /100    Absolute Neutrophil 3.7 1.6 - 8.3 10e9/L    Absolute Lymphocytes 1.8 0.8 - 5.3 10e9/L    Absolute Monocytes 0.7 0.0 - 1.3 10e9/L    Absolute Eosinophils 0.2 0.0 - 0.7 10e9/L    Absolute  Basophils 0.0 0.0 - 0.2 10e9/L    Abs Immature Granulocytes 0.0 0 - 0.4 10e9/L    Absolute Nucleated RBC 0.0    Comprehensive metabolic panel   Result Value Ref Range    Sodium 140 133 - 144 mmol/L    Potassium 3.3 (L) 3.4 - 5.3 mmol/L    Chloride 101 94 - 109 mmol/L    Carbon Dioxide 32 20 - 32 mmol/L    Anion Gap 6 3 - 14 mmol/L    Glucose 103 (H) 70 - 99 mg/dL    Urea Nitrogen 10 7 - 30 mg/dL    Creatinine 0.84 0.66 - 1.25 mg/dL    GFR Estimate >90 >60 mL/min/1.7m2    GFR Estimate If Black >90 >60 mL/min/1.7m2    Calcium 9.4 8.5 - 10.1 mg/dL    Bilirubin Total 1.1 0.2 - 1.3 mg/dL    Albumin 3.1 (L) 3.4 - 5.0 g/dL    Protein Total 7.6 6.8 - 8.8 g/dL    Alkaline Phosphatase 87 40 - 150 U/L    ALT 16 0 - 70 U/L    AST 16 0 - 45 U/L   Lactic acid   Result Value Ref Range    Lactic Acid 1.1 0.7 - 2.0 mmol/L   CRP inflammation   Result Value Ref Range    CRP Inflammation 6.2 0.0 - 8.0 mg/L   INR   Result Value Ref Range    INR 2.01 (H) 0.86 - 1.14   Magnesium   Result Value Ref Range    Magnesium 2.0 1.6 - 2.3 mg/dL   Basic metabolic panel   Result Value Ref Range    Sodium 140 133 - 144 mmol/L    Potassium 3.7 3.4 - 5.3 mmol/L    Chloride 102 94 - 109 mmol/L    Carbon Dioxide 30 20 - 32 mmol/L    Anion Gap 8 3 - 14 mmol/L    Glucose 115 (H) 70 - 99 mg/dL    Urea Nitrogen 9 7 - 30 mg/dL    Creatinine 0.78 0.66 - 1.25 mg/dL    GFR Estimate >90 >60 mL/min/1.7m2    GFR Estimate If Black >90 >60 mL/min/1.7m2    Calcium 9.2 8.5 - 10.1 mg/dL   Magnesium   Result Value Ref Range    Magnesium 1.9 1.6 - 2.3 mg/dL   Phosphorus   Result Value Ref Range    Phosphorus 3.7 2.5 - 4.5 mg/dL   Glucose by meter   Result Value Ref Range    Glucose 107 (H) 70 - 99 mg/dL   Basic metabolic panel   Result Value Ref Range    Sodium 140 133 - 144 mmol/L    Potassium 3.4 3.4 - 5.3 mmol/L    Chloride 101 94 - 109 mmol/L    Carbon Dioxide 32 20 - 32 mmol/L    Anion Gap 7 3 - 14 mmol/L    Glucose 96 70 - 99 mg/dL    Urea Nitrogen 9 7 - 30 mg/dL     Creatinine 0.79 0.66 - 1.25 mg/dL    GFR Estimate >90 >60 mL/min/1.7m2    GFR Estimate If Black >90 >60 mL/min/1.7m2    Calcium 8.8 8.5 - 10.1 mg/dL   Glucose by meter   Result Value Ref Range    Glucose 102 (H) 70 - 99 mg/dL   CBC with platelets   Result Value Ref Range    WBC 7.2 4.0 - 11.0 10e9/L    RBC Count 4.31 (L) 4.4 - 5.9 10e12/L    Hemoglobin 12.7 (L) 13.3 - 17.7 g/dL    Hematocrit 40.8 40.0 - 53.0 %    MCV 95 78 - 100 fl    MCH 29.5 26.5 - 33.0 pg    MCHC 31.1 (L) 31.5 - 36.5 g/dL    RDW 15.7 (H) 10.0 - 15.0 %    Platelet Count 202 150 - 450 10e9/L   CRP inflammation   Result Value Ref Range    CRP Inflammation 8.9 (H) 0.0 - 8.0 mg/L   Basic metabolic panel   Result Value Ref Range    Sodium 139 133 - 144 mmol/L    Potassium 3.4 3.4 - 5.3 mmol/L    Chloride 102 94 - 109 mmol/L    Carbon Dioxide 28 20 - 32 mmol/L    Anion Gap 10 3 - 14 mmol/L    Glucose 97 70 - 99 mg/dL    Urea Nitrogen 8 7 - 30 mg/dL    Creatinine 0.79 0.66 - 1.25 mg/dL    GFR Estimate >90 >60 mL/min/1.7m2    GFR Estimate If Black >90 >60 mL/min/1.7m2    Calcium 8.9 8.5 - 10.1 mg/dL   Magnesium   Result Value Ref Range    Magnesium 2.0 1.6 - 2.3 mg/dL   Phosphorus   Result Value Ref Range    Phosphorus 4.0 2.5 - 4.5 mg/dL   INR   Result Value Ref Range    INR 1.92 (H) 0.86 - 1.14   Glucose by meter   Result Value Ref Range    Glucose 103 (H) 70 - 99 mg/dL   Glucose by meter   Result Value Ref Range    Glucose 107 (H) 70 - 99 mg/dL   TSH   Result Value Ref Range    TSH 6.80 (H) 0.40 - 4.00 mU/L   Glucose by meter   Result Value Ref Range    Glucose 133 (H) 70 - 99 mg/dL   Basic metabolic panel   Result Value Ref Range    Sodium 137 133 - 144 mmol/L    Potassium 3.2 (L) 3.4 - 5.3 mmol/L    Chloride 96 94 - 109 mmol/L    Carbon Dioxide 32 20 - 32 mmol/L    Anion Gap 9 3 - 14 mmol/L    Glucose 110 (H) 70 - 99 mg/dL    Urea Nitrogen 9 7 - 30 mg/dL    Creatinine 0.82 0.66 - 1.25 mg/dL    GFR Estimate >90 >60 mL/min/1.7m2    GFR Estimate If  Black >90 >60 mL/min/1.7m2    Calcium 9.1 8.5 - 10.1 mg/dL   Magnesium   Result Value Ref Range    Magnesium 2.1 1.6 - 2.3 mg/dL   Phosphorus   Result Value Ref Range    Phosphorus 4.6 (H) 2.5 - 4.5 mg/dL   Glucose by meter   Result Value Ref Range    Glucose 113 (H) 70 - 99 mg/dL   Basic metabolic panel   Result Value Ref Range    Sodium 137 133 - 144 mmol/L    Potassium 3.2 (L) 3.4 - 5.3 mmol/L    Chloride 96 94 - 109 mmol/L    Carbon Dioxide 32 20 - 32 mmol/L    Anion Gap 10 3 - 14 mmol/L    Glucose 100 (H) 70 - 99 mg/dL    Urea Nitrogen 11 7 - 30 mg/dL    Creatinine 0.86 0.66 - 1.25 mg/dL    GFR Estimate 88 >60 mL/min/1.7m2    GFR Estimate If Black >90 >60 mL/min/1.7m2    Calcium 9.0 8.5 - 10.1 mg/dL   Glucose by meter   Result Value Ref Range    Glucose 127 (H) 70 - 99 mg/dL   CBC with platelets   Result Value Ref Range    WBC 7.9 4.0 - 11.0 10e9/L    RBC Count 4.71 4.4 - 5.9 10e12/L    Hemoglobin 14.1 13.3 - 17.7 g/dL    Hematocrit 44.9 40.0 - 53.0 %    MCV 95 78 - 100 fl    MCH 29.9 26.5 - 33.0 pg    MCHC 31.4 (L) 31.5 - 36.5 g/dL    RDW 15.7 (H) 10.0 - 15.0 %    Platelet Count 235 150 - 450 10e9/L   INR   Result Value Ref Range    INR 1.89 (H) 0.86 - 1.14   Glucose by meter   Result Value Ref Range    Glucose 107 (H) 70 - 99 mg/dL   Glucose by meter   Result Value Ref Range    Glucose 121 (H) 70 - 99 mg/dL   Glucose by meter   Result Value Ref Range    Glucose 147 (H) 70 - 99 mg/dL   Basic metabolic panel   Result Value Ref Range    Sodium 135 133 - 144 mmol/L    Potassium 3.5 3.4 - 5.3 mmol/L    Chloride 93 (L) 94 - 109 mmol/L    Carbon Dioxide 33 (H) 20 - 32 mmol/L    Anion Gap 9 3 - 14 mmol/L    Glucose 110 (H) 70 - 99 mg/dL    Urea Nitrogen 10 7 - 30 mg/dL    Creatinine 0.92 0.66 - 1.25 mg/dL    GFR Estimate 82 >60 mL/min/1.7m2    GFR Estimate If Black >90 >60 mL/min/1.7m2    Calcium 9.0 8.5 - 10.1 mg/dL   Magnesium   Result Value Ref Range    Magnesium 2.2 1.6 - 2.3 mg/dL   Phosphorus   Result Value Ref  Range    Phosphorus 3.8 2.5 - 4.5 mg/dL   Glucose by meter   Result Value Ref Range    Glucose 124 (H) 70 - 99 mg/dL   Glucose by meter   Result Value Ref Range    Glucose 121 (H) 70 - 99 mg/dL   INR   Result Value Ref Range    INR 2.24 (H) 0.86 - 1.14   Basic metabolic panel   Result Value Ref Range    Sodium 134 133 - 144 mmol/L    Potassium 3.5 3.4 - 5.3 mmol/L    Chloride 94 94 - 109 mmol/L    Carbon Dioxide 32 20 - 32 mmol/L    Anion Gap 8 3 - 14 mmol/L    Glucose 96 70 - 99 mg/dL    Urea Nitrogen 12 7 - 30 mg/dL    Creatinine 0.84 0.66 - 1.25 mg/dL    GFR Estimate >90 >60 mL/min/1.7m2    GFR Estimate If Black >90 >60 mL/min/1.7m2    Calcium 8.6 8.5 - 10.1 mg/dL     *Note: Due to a large number of results and/or encounters for the requested time period, some results have not been displayed. A complete set of results can be found in Results Review.     All cardiac studies reviewed by me.  All imaging studies reviewed by me.         Pending Results:   None      It was a pleasure to participate in the care of Clarke Moreira.  If you have questions about his care, please do not hesitate to contact the [facility] team via the hospital var[facility] on which we are stationed.      Moises Kaur DO, MA  Family Medicine PGY-1  [facility]  [facility] - [phone]

## 2018-01-08 NOTE — PLAN OF CARE
Problem: Patient Care Overview  Goal: Plan of Care/Patient Progress Review  PT/5A: Pt did not sleep well last night, politely declining therapy this day due to fatigue and wanting to sleep. Cancel

## 2018-01-08 NOTE — PLAN OF CARE
Problem: Patient Care Overview  Goal: Plan of Care/Patient Progress Review  Outcome: No Change  Pt admitted with lower extremity edema and cellulitis. A&Ox4. Pt on 2L NC. Pt on a continuous lasix gtt at 10 mg/ hr. Pt voiding adequate amounts in the urinal. Pt's legs were swollen and had lymph wraps on. Pt on a 1.5L fluid restriction. Pt walked in the halls with the assist of one and his walker. Pt had some dyspnea on exertion. Pt complained of constipation, and last BM was on 1-3. Pt will received senna. Pt able to make his needs known. Continue with plan of care.

## 2018-01-08 NOTE — PLAN OF CARE
Problem: Patient Care Overview  Goal: Plan of Care/Patient Progress Review  Patient alert and oriented x4. Vitals stable on 2L oxygen via NC. Patient on continuous lasix gtt 10 mg/hr. Good UOP. No BM this shift.BLE lymph wraps in place. 1500 ml FR. 2 gram sodium diet. Patient denies pain no nausea, vomiting, dizziness or chest pain. Will continue to monitor and follow plan of care.

## 2018-01-08 NOTE — PROGRESS NOTES
Care Coordinator Progress Note     Admission Date/Time:  1/5/2018  Attending MD:  Devorah Torres DO     Data  Chart reviewed, discussed with interdisciplinary team.   Patient was admitted for:    Cellulitis of right upper extremity  Cellulitis of left upper extremity  Chronic systolic congestive heart failure (H)  Hypervolemia, unspecified hypervolemia type  Stasis dermatitis of both legs  Chronic atrial fibrillation (H).    Concerns with insurance coverage for discharge needs: None; [employer], (disability)  Current Living Situation: Patient lives alone; in an apartment, has had hx of limited ability to care for self.  Support System: Supportive; friends: Janice Ortizrossi (p) [phone] or Dre Lucas (p) [phone]  Services Involved: Home Care, also has home CPAP (was provided by [facility])  Transportation: [place] transportation,  [facility] [employer] [phone] and [employer] (wheelchair required)  Barriers to Discharge: continued diuresis    Coordination of Care     Assessment  D: Chart reviewed and plan of care discussed with MD team and nursing. Patient admitted for fever and neutropenia. Plan for patient to discharge today. I/A: Discharge needs include resumption of [facility] skilled nursing, physical therapy, and occupational therapy for lymphedema wrapping. Therapies report patient experienced a decrease in O2 sat with exersion; nursing walk test is required within 24 hours of patients discharge to determine if new home oxygen is needed.      P: Care coordinator will remain available for discharge needs that may arise.     Referrals: Patient requested resumption of Home Care       Plan  Anticipated Discharge Date:  1/9/2018  Anticipated Discharge Plan:  Home with home services    Kimberlyn GODOY RN PHN  Patient Care Management Coordinator  [facility]  Phone: [phone] / Pager: [phone]

## 2018-01-08 NOTE — TELEPHONE ENCOUNTER
SW received call from MERVAT Goodwin (P: [phone]) to follow up in inquiry from ED MERVAT Solomon about whether the report was screened in and if any additional information was able to be provided. Sugey advised that the report was not screened in and that she could not offer any additional information.    Andra WALKER, [facility]  ED   ED Pager: [phone]  On-call/After hours Pager: [phone]

## 2018-01-09 ENCOUNTER — APPOINTMENT (OUTPATIENT)
Dept: OCCUPATIONAL THERAPY | Facility: CLINIC | Age: 68
DRG: 292 | End: 2018-01-09
Payer: COMMERCIAL

## 2018-01-09 ENCOUNTER — APPOINTMENT (OUTPATIENT)
Dept: PHYSICAL THERAPY | Facility: CLINIC | Age: 68
DRG: 292 | End: 2018-01-09
Payer: COMMERCIAL

## 2018-01-09 LAB
ANION GAP SERPL CALCULATED.3IONS-SCNC: 5 MMOL/L (ref 3–14)
BUN SERPL-MCNC: 16 MG/DL (ref 7–30)
CALCIUM SERPL-MCNC: 9.1 MG/DL (ref 8.5–10.1)
CHLORIDE SERPL-SCNC: 92 MMOL/L (ref 94–109)
CO2 SERPL-SCNC: 36 MMOL/L (ref 20–32)
CREAT SERPL-MCNC: 0.88 MG/DL (ref 0.66–1.25)
GFR SERPL CREATININE-BSD FRML MDRD: 86 ML/MIN/1.7M2
GLUCOSE BLDC GLUCOMTR-MCNC: 101 MG/DL (ref 70–99)
GLUCOSE BLDC GLUCOMTR-MCNC: 108 MG/DL (ref 70–99)
GLUCOSE BLDC GLUCOMTR-MCNC: 117 MG/DL (ref 70–99)
GLUCOSE BLDC GLUCOMTR-MCNC: 119 MG/DL (ref 70–99)
GLUCOSE BLDC GLUCOMTR-MCNC: 86 MG/DL (ref 70–99)
GLUCOSE SERPL-MCNC: 98 MG/DL (ref 70–99)
INR PPP: 2.5 (ref 0.86–1.14)
MAGNESIUM SERPL-MCNC: 2.2 MG/DL (ref 1.6–2.3)
MAGNESIUM SERPL-MCNC: 2.3 MG/DL (ref 1.6–2.3)
POTASSIUM SERPL-SCNC: 3.8 MMOL/L (ref 3.4–5.3)
SODIUM SERPL-SCNC: 133 MMOL/L (ref 133–144)

## 2018-01-09 PROCEDURE — 12000008 ZZH R&B INTERMEDIATE UMMC

## 2018-01-09 PROCEDURE — 25000132 ZZH RX MED GY IP 250 OP 250 PS 637: Performed by: STUDENT IN AN ORGANIZED HEALTH CARE EDUCATION/TRAINING PROGRAM

## 2018-01-09 PROCEDURE — 25000132 ZZH RX MED GY IP 250 OP 250 PS 637: Performed by: FAMILY MEDICINE

## 2018-01-09 PROCEDURE — 25000132 ZZH RX MED GY IP 250 OP 250 PS 637: Performed by: NURSE PRACTITIONER

## 2018-01-09 PROCEDURE — 00000146 ZZHCL STATISTIC GLUCOSE BY METER IP

## 2018-01-09 PROCEDURE — 80048 BASIC METABOLIC PNL TOTAL CA: CPT | Performed by: FAMILY MEDICINE

## 2018-01-09 PROCEDURE — 83735 ASSAY OF MAGNESIUM: CPT | Performed by: FAMILY MEDICINE

## 2018-01-09 PROCEDURE — 36415 COLL VENOUS BLD VENIPUNCTURE: CPT | Performed by: FAMILY MEDICINE

## 2018-01-09 PROCEDURE — 40000894 ZZH STATISTIC OT IP EVAL DEFER: Performed by: OCCUPATIONAL THERAPIST

## 2018-01-09 PROCEDURE — 99231 SBSQ HOSP IP/OBS SF/LOW 25: CPT | Performed by: NURSE PRACTITIONER

## 2018-01-09 PROCEDURE — 85610 PROTHROMBIN TIME: CPT | Performed by: FAMILY MEDICINE

## 2018-01-09 PROCEDURE — 97140 MANUAL THERAPY 1/> REGIONS: CPT | Mod: GO

## 2018-01-09 PROCEDURE — 40000133 ZZH STATISTIC OT WARD VISIT

## 2018-01-09 RX ORDER — LISINOPRIL 2.5 MG/1
2.5 TABLET ORAL DAILY
Status: DISCONTINUED | OUTPATIENT
Start: 2018-01-09 | End: 2018-01-09

## 2018-01-09 RX ORDER — FUROSEMIDE 20 MG
20 TABLET ORAL ONCE
Status: COMPLETED | OUTPATIENT
Start: 2018-01-09 | End: 2018-01-09

## 2018-01-09 RX ORDER — LISINOPRIL 2.5 MG/1
2.5 TABLET ORAL 2 TIMES DAILY
Status: DISCONTINUED | OUTPATIENT
Start: 2018-01-09 | End: 2018-01-10 | Stop reason: HOSPADM

## 2018-01-09 RX ORDER — FUROSEMIDE 40 MG
40 TABLET ORAL 2 TIMES DAILY
Status: DISCONTINUED | OUTPATIENT
Start: 2018-01-09 | End: 2018-01-09

## 2018-01-09 RX ADMIN — WARFARIN SODIUM 12.5 MG: 10 TABLET ORAL at 18:09

## 2018-01-09 RX ADMIN — SPIRONOLACTONE 25 MG: 25 TABLET ORAL at 20:46

## 2018-01-09 RX ADMIN — LEVOTHYROXINE SODIUM 175 MCG: 100 TABLET ORAL at 06:33

## 2018-01-09 RX ADMIN — LISINOPRIL 2.5 MG: 2.5 TABLET ORAL at 11:23

## 2018-01-09 RX ADMIN — SENNOSIDES AND DOCUSATE SODIUM 2 TABLET: 8.6; 5 TABLET ORAL at 10:04

## 2018-01-09 RX ADMIN — SULFAMETHOXAZOLE AND TRIMETHOPRIM 1 TABLET: 800; 160 TABLET ORAL at 20:45

## 2018-01-09 RX ADMIN — SULFAMETHOXAZOLE AND TRIMETHOPRIM 1 TABLET: 800; 160 TABLET ORAL at 10:04

## 2018-01-09 RX ADMIN — Medication 1 CAPSULE: at 20:45

## 2018-01-09 RX ADMIN — POTASSIUM CHLORIDE 40 MEQ: 750 TABLET, EXTENDED RELEASE ORAL at 08:13

## 2018-01-09 RX ADMIN — FUROSEMIDE 40 MG: 40 TABLET ORAL at 10:04

## 2018-01-09 RX ADMIN — FUROSEMIDE 60 MG: 40 TABLET ORAL at 16:02

## 2018-01-09 RX ADMIN — ATORVASTATIN CALCIUM 40 MG: 40 TABLET, FILM COATED ORAL at 08:13

## 2018-01-09 RX ADMIN — POTASSIUM CHLORIDE 40 MEQ: 750 TABLET, EXTENDED RELEASE ORAL at 20:47

## 2018-01-09 RX ADMIN — ASPIRIN 81 MG: 81 TABLET, COATED ORAL at 08:12

## 2018-01-09 RX ADMIN — METOPROLOL SUCCINATE 100 MG: 50 TABLET, EXTENDED RELEASE ORAL at 20:45

## 2018-01-09 RX ADMIN — TAMSULOSIN HYDROCHLORIDE 0.4 MG: 0.4 CAPSULE ORAL at 20:46

## 2018-01-09 RX ADMIN — Medication 1 G: at 08:13

## 2018-01-09 RX ADMIN — FUROSEMIDE 20 MG: 20 TABLET ORAL at 11:23

## 2018-01-09 RX ADMIN — SPIRONOLACTONE 25 MG: 25 TABLET ORAL at 08:13

## 2018-01-09 RX ADMIN — Medication 1 CAPSULE: at 08:13

## 2018-01-09 RX ADMIN — MULTIPLE VITAMINS W/ MINERALS TAB 1 TABLET: TAB at 08:13

## 2018-01-09 RX ADMIN — LISINOPRIL 2.5 MG: 2.5 TABLET ORAL at 20:45

## 2018-01-09 ASSESSMENT — ENCOUNTER SYMPTOMS
COUGH: 0
ABDOMINAL PAIN: 0
ORTHOPNEA: 0
VOMITING: 0
SHORTNESS OF BREATH: 0
SPUTUM PRODUCTION: 0
WEAKNESS: 0
PND: 0
NAUSEA: 0
FEVER: 0

## 2018-01-09 NOTE — PLAN OF CARE
Problem: Patient Care Overview  Goal: Plan of Care/Patient Progress Review  PT - per plan established by the Physical Therapist, according to functional mobility the  discharge recommendation is home with home PT/OT/ nursing. Pt is progressing well. Pt needing SBA for all bed mob and CGA for sit to stand to WW. 02 walk test see care plan. Pt amb up to 80'x 1 with WW needing SBA on 2L 02 SATs at 91%. Pt stating feeling light headed when up  And BP checked once back in room BP 82/46, once back in supine after 3 min elevated 112/96.   Discharge Planner PT   Patient plan for discharge: home with home PT/OT/ RN  Current status: see above.   Barriers to return to prior living situation: weakness, SOB fatigue.   Recommendations for discharge: home with home.   Rationale for recommendations: pt is progressing well cont to progress with home PT.        Entered by: Julio Martinez 01/09/2018 2:47 PM

## 2018-01-09 NOTE — PROGRESS NOTES
[facility] - Inpatient daily progress note    Date of admission: 1/5/2018  Date of service: 1/9/2018         Assessment and Plan:   Assessment:   This is a 67 year old male with history of HFrEF, chronic venous stasis, atrial fibrillation, Type-2-diabetes, PVD, morbid obesity, and depression admitted for worsening lower extremity edema and volume overload with some concern for lower extremity cellulitis.  Patient Active Problem List   Diagnosis     Atrial fibrillation (H)     Hypothyroidism     Basal cell carcinoma of skin of face     CTS (carpal tunnel syndrome)     Myopia     Plantar fasciitis     Presbyopia     Regular astigmatism     Neoplasm of uncertain behavior of skin     Venous stasis     Type 2 diabetes mellitus with hyperglycemia, without long-term current use of insulin (H)     Morbid obesity (H)     Depression with anxiety     Fall from chair, initial encounter     Hyperlipidemia LDL goal <100     Fall     SOB (shortness of breath)     BiPAP (biphasic positive airway pressure) dependence     Lymphedema     Chronic systolic congestive heart failure (H)     Essential hypertension with goal blood pressure less than 140/90     Urinary retention     Hypokalemia     Constipation, unspecified constipation type     Cellulitis      Plan:    ## Concern for bilateral LE cellulitis:   ## Chronic Venous stasis with dermatitis: Improved   Improved venous stasis after aggressive diuresis. He remains hemodynamically stable.   - Continue Bactrim 800-160 mg BID (Today Day-4). Plan for five days total.  - Continue lactobacillus BID   - Lymphedema/Physical therapy and occupational therapy following.   - Continue to monitor vitals closely      ## Symptomatic HFrEF:   ## Pulmonary congestion:   Improved dyspnea, orthopnea, paroxysmal dyspnea, and edema after agressive diuresis. Cardiology following and recommendations appreciated. Weight is down from previously reported  dry weight of 415lbs.    - Lasix gtt stopped and started lasix PO 60 mg BID.  - Continue to monitor volume status.   - Continue Potassium replacement BID   - BMP and electrolytes BID   - Daily weights and strict I/O   - Continue to monitor vitals closely   - Continue CPAP with home settings   - Continue Metoprolol 100 mg daily, and Spironolactone 25 mg daily.  - Restart lisinopril 2.5mg PO BID.   - Will follow up with [facility] as outpt  - Will assess RHC and ischemic eval as outpt with cards   - O2 walk test to assess home O2 needs today      ## Atrial Fibrillation: Stable   CHADS-VaSC 5.  Has been intermittently having heart rates as high as 140 over the past 12 hours.  These have not been sustained.  This was discussed with cardiology who recommended continuing diuresis and not addressing the heart rate at this time.  - Continue PTA Metoprolol 100 mg daily   - Pharmacy to dose Warfarin   - Daily INR. Will have home care assess upon discharge.   - Telemetry Monitoring considering tachycardia     ## Obesity Hypoventilation syndrome:  ## Sleep Apnea:  - Continue diuresis per above   - Continue CPAP with home settings     ## Coronary Artery Disease:   ## PAD:   ## Essential Hypertension:   - Continue PTA Aspirin 81 mg daily   - Continue high intensity Lipitor 40 mg daily   - Continue PTA BP medications per above      ## Hypothyroidism:    TSH (11/2017) elevated at 4.89, repeat this stay was 6.8.   - Increase PTA Levothyroxine from 150 mcg daily to 175 mcg   - recheck TSH as outpatient in 6 weeks      ## DM-Type-2: Controlled   A1c (11/2017): 6.3  - Hold PTA metformin 500 mg BID   - MSSI      ## BPH: Chronic, stable   - Continue PTA Flomax 0.4 mg daily      Daily cares -   F: PO   E: stable  N: Low fat diet. 1500 fluid restriction, 2g Na restriction.   Lines: PIV,   Activity:-Up as tolerated  CODE:Full Code  Prophylaxis: Warfarin   PCP communication:  - Matthias Sanchez  - Contacted at admission: No  - Concerns or  updates: None   Dispo: Expected discharge date: Tomorrow pending clinical stability              Interval History:   Patient was seen and evaluated this morning. Introduced myself as new member of the team. He has no complaints and was pleasant. Vitals signs and nurses notes reviewed. Medical management including labs, vitals and care plan were discussed with patient. Patient would be further updated during rounds with attending physician with additional management and plan.             Review of Systems:   Review of Systems   Constitutional: Negative for fever and malaise/fatigue.   Respiratory: Negative for cough, sputum production and shortness of breath.    Cardiovascular: Positive for leg swelling (Improved). Negative for chest pain, orthopnea and PND.   Gastrointestinal: Negative for abdominal pain, nausea and vomiting.   Neurological: Negative for weakness.          Physical Exam (Resident / Clinician):   Vitals were reviewed  Temp: 97.3  F (36.3  C) Temp src: Oral BP: 119/65   Heart Rate: 95 Resp: 16 SpO2: 98 % O2 Device: Nasal cannula with humidification Oxygen Delivery: 2 LPM    Physical Exam   Constitutional: He is oriented to person, place, and time. No distress.   HENT:   Head: Normocephalic and atraumatic.   Eyes: Conjunctivae are normal. No scleral icterus.   Cardiovascular: Normal rate and normal heart sounds.    Pulmonary/Chest: Effort normal and breath sounds normal. No respiratory distress. He has no wheezes. He has no rales.   Abdominal: Soft. He exhibits distension (obese). He exhibits no mass. There is no tenderness. There is no rebound and no guarding.   Musculoskeletal: Normal range of motion. He exhibits edema.   Neurological: He is alert and oriented to person, place, and time.   Skin: Skin is warm. He is not diaphoretic.   Bilateral 2+ edema. Legs dressing CDI   Psychiatric: Affect and judgment normal.       Intake/Output Summary (Last 24 hours) at 01/09/18 1002  Last data filed at  01/09/18 0600   Gross per 24 hour   Intake              470 ml   Output             1880 ml   Net            -1410 ml             Data:   ROUTINE LABS (Last four results)  CMP    Recent Labs  Lab 01/09/18  0638 01/08/18  1822 01/08/18  1148 01/08/18  0556 01/07/18  1845  01/06/18  1733 01/06/18  0728  01/05/18  1109    136  --  134 135  < > 137 139  < > 140   POTASSIUM 3.8 4.4  --  3.5 3.5  < > 3.2* 3.4  < > 3.3*   CHLORIDE 92* 93*  --  94 93*  < > 96 102  < > 101   CO2 36* 38*  --  32 33*  < > 32 28  < > 32   ANIONGAP 5 5  --  8 9  < > 9 10  < > 6   GLC 98 123*  --  96 110*  < > 110* 97  < > 103*   BUN 16 14  --  12 10  < > 9 8  < > 10   CR 0.88 1.02  --  0.84 0.92  < > 0.82 0.79  < > 0.84   GFRESTIMATED 86 73  --  >90 82  < > >90 >90  < > >90   GFRESTBLACK >90 88  --  >90 >90  < > >90 >90  < > >90   CHERI 9.1 9.2  --  8.6 9.0  < > 9.1 8.9  < > 9.4   MAG 2.3 2.2 2.3  --  2.2  --  2.1 2.0  < > 2.0   PHOS  --   --  3.4  --  3.8  --  4.6* 4.0  < >  --    PROTTOTAL  --   --   --   --   --   --   --   --   --  7.6   ALBUMIN  --   --   --   --   --   --   --   --   --  3.1*   BILITOTAL  --   --   --   --   --   --   --   --   --  1.1   ALKPHOS  --   --   --   --   --   --   --   --   --  87   AST  --   --   --   --   --   --   --   --   --  16   ALT  --   --   --   --   --   --   --   --   --  16   < > = values in this interval not displayed.  CBC    Recent Labs  Lab 01/07/18  0906 01/06/18  0728 01/05/18  1109   WBC 7.9 7.2 6.5   RBC 4.71 4.31* 4.45   HGB 14.1 12.7* 13.4   HCT 44.9 40.8 42.5   MCV 95 95 96   MCH 29.9 29.5 30.1   MCHC 31.4* 31.1* 31.5   RDW 15.7* 15.7* 15.8*    202 182     INR    Recent Labs  Lab 01/09/18  0638 01/08/18  0556 01/07/18  0906 01/06/18  0728   INR 2.50* 2.24* 1.89* 1.92*     CRP    Recent Labs  Lab 01/06/18  0728 01/05/18  1109   CRP 8.9* 6.2       Blood culture:  Invalid input(s): BC   Urine culture:  No results for input(s): URC in the last 168 hours.  All cultures:    Recent  Labs  Lab 01/05/18  1822 01/05/18  1818   CULT No growth after 4 days No growth after 4 days         Medications:     Current Facility-Administered Medications   Medication     warfarin (COUMADIN) tablet 12.5 mg     lisinopril (PRINIVIL/Zestril) tablet 2.5 mg     [START ON 1/10/2018] furosemide (LASIX) tablet 60 mg     furosemide (LASIX) tablet 60 mg     potassium chloride SA (K-DUR/KLOR-CON M) CR tablet 40 mEq     pneumococcal vaccine (PNEUMOVAX 23-lisa) injection 0.5 mL     levothyroxine (SYNTHROID/LEVOTHROID) tablet 175 mcg     aspirin EC EC tablet 81 mg     atorvastatin (LIPITOR) tablet 40 mg     ipratropium - albuterol 0.5 mg/2.5 mg/3 mL (DUONEB) neb solution 3 mL     metoprolol (TOPROL-XL) 24 hr tablet 100 mg     multivitamin, therapeutic with minerals (THERA-VIT-M) tablet 1 tablet     fish oil-omega-3 fatty acids capsule 1 g     spironolactone (ALDACTONE) tablet 25 mg     tamsulosin (FLOMAX) capsule 0.4 mg     naloxone (NARCAN) injection 0.1-0.4 mg     lidocaine 1 % 1 mL     lidocaine (LMX4) kit     sodium chloride (PF) 0.9% PF flush 3 mL     sodium chloride (PF) 0.9% PF flush 3 mL     Patient is already receiving anticoagulation with heparin, enoxaparin (LOVENOX), warfarin (COUMADIN)  or other anticoagulant medication     potassium chloride SA (K-DUR/KLOR-CON M) CR tablet 20-40 mEq     potassium chloride (KLOR-CON) Packet 20-40 mEq     potassium chloride 10 mEq in 100 mL sterile water intermittent infusion (premix)     potassium chloride 10 mEq in 100 mL intermittent infusion with 10 mg lidocaine     potassium chloride 20 mEq in 50 mL intermittent infusion     acetaminophen (TYLENOL) tablet 650 mg     ibuprofen (ADVIL/MOTRIN) tablet 600 mg     senna-docusate (SENOKOT-S;PERICOLACE) 8.6-50 MG per tablet 1 tablet    Or     senna-docusate (SENOKOT-S;PERICOLACE) 8.6-50 MG per tablet 2 tablet     polyethylene glycol (MIRALAX/GLYCOLAX) Packet 17 g     ondansetron (ZOFRAN-ODT) ODT tab 4 mg    Or     ondansetron  (ZOFRAN) injection 4 mg     prochlorperazine (COMPAZINE) injection 5 mg    Or     prochlorperazine (COMPAZINE) tablet 5 mg    Or     prochlorperazine (COMPAZINE) Suppository 12.5 mg     glucose 40 % gel 15-30 g    Or     dextrose 50 % injection 25-50 mL    Or     glucagon injection 1 mg     insulin aspart (NovoLOG) inj (RAPID ACTING)     insulin aspart (NovoLOG) inj (RAPID ACTING)     sulfamethoxazole-trimethoprim (BACTRIM DS/SEPTRA DS) 800-160 MG per tablet 1 tablet     lactobacillus rhamnosus (GG) (CULTURELL) capsule 1 capsule     Warfarin Therapy Reminder (Check START DATE - warfarin may be starting in the FUTURE)       Caring Physician: Moises Kaur DO  [facility]  Pager Contact: see Physician sticky note

## 2018-01-09 NOTE — PLAN OF CARE
Problem: Patient Care Overview  Goal: Plan of Care/Patient Progress Review  OT: 5A: OT orders reviewed, reviewed and completed. Per thorough chart review and discussion w/ PT, pt w/ no inpt OT needs at this time. PT reporting no concerns w/ ADLS at this time. OT orders completed. Thank you for referral.

## 2018-01-09 NOTE — CONSULTS
[facility]   Cardiology II Service / Advanced Heart Failure  Consult Note  Date of Service: 1/9/2018      Patient: Clarke Moreira  MRN: [id number]  Admission Date: 1/5/2018  Hospital Day # 4    Assessment and Plan:  Mr. Clarke Moreira is a 67yr old male with a history of CAD, chronic systolic heart failure secondary to ICM, chronic venous stasis, A Flutter/Fib (s/p ablation 2008), DMII, PAD, HL, HTN, morbid obesity, THONG (on CPAP), and depression who was admitted for concerns of LE cellulitis.    Reason for consult:  Management of fluid retention and systolic heart failure    Acute on chronic systolic heart failure secondary to ICM  Morbid obesity  Chronic hypoventilation  Etiology of heart failure is likely multifactorial, and include obesity and chronic hypoventilation.  His estimated dry weight is reportedly 415-420#.  He states that he was started on lasix upon d/c from TCU several weeks ago.  His dose was escalated to 40mg BID, which he believed held his fluid retention stable.  He notes that he was consistently taking 40mg twice daily for approximately 2 weeks, but ran out about 10 days prior to admission.    NYHA Class III, Stage C    Echo 9/2017 showed likely normal LVEF, with mildly reduced RV function.  CT Chest 9/2017 showed enlarged main pulmonary artery, concerning for pulmonary hypertension.    - ACEi/ARB:  Recommend starting lisinopril 2.5mg twice daily (noted that dose has been held this admission)  - BB:  Continue metoprolol XL 100mg daily  - Aldosterone antagonist:  Continue spironolactone 25mg twice daily  - SCD prophylaxis:  N/A, does not meet criteria  - fluid status:  Slightly hypervolemic, agree with d/c'ing lasix infusion and recommend starting lasix 60mg twice daily.  Repeat BMP in the am.  - Other:     - recommend that he follow up in the [facility] clinic within one week of hospital discharge   - will coordinate RHC as outpatient   - will discuss ischemic eval with   "Zachariah   - scheduled for outpatient sleep study on Jan 18      CAD  Care Everywhere does not have any records reflecting a coronary evaluation (note that [facility]where consents were signed by pt and placed in paper chart).  He does not recall having had a coronary evaluation, but notes that he had some problems where \"they looked at my heart\" following an appendectomy in 2007, here at the [facility].  Unclear extent of coronary disease, so will consider coronary evaluation as outpatient.  - Remains on statin, aspirin, and BB      AFlutter/Fib  H/o ablation 2/2008.  CHASDsVASC 5.  - anticoagulation:  Warfarin, goal INR 2-3.  INR today 2.5.  - BB:  Metoprolol XL 100mg daily.  Could consider increasing if HRs remain elevated.    =============================================================    Interval History/ROS:   Mr. Moreira states that he feels much better.  He remains on oxygen, stating that his O2 sats have been drifting below 90% when awake.  He states that his fluid retention has greatly improved, and is excited to see his leg/knee bones.  He states that his breathing status is much improved, and that his abdominal bloating has resolved.  He otherwise denies chest pain, palpitations, shortness of breath, dizziness, worsening LE edema, nausea, vomiting, and diarrhea.    Last 24 hr care team notes reviewed.   ROS:  4 point ROS including Respiratory, CV, GI and , other than that noted in the HPI, is negative.     Medications: Reviewed in EPIC.     Physical Exam:   /57 (BP Location: Left arm)  Pulse 89  Temp 97.7  F (36.5  C) (Oral)  Resp 16  Wt (!) 168.5 kg (371 lb 6.4 oz)  SpO2 96%  BMI 50.37 kg/m2  GENERAL: Appears alert and oriented times three. Lying in bed, NAD.  HEENT: Eye symmetrical and free of discharge bilaterally. Mucous membranes moist and without lesions.  NECK: Supple and without lymphadenopathy. JVD at mid neck when lying at 45 degrees.   CV: RRR, S1S2 present without murmur, rub, " or gallop.   RESPIRATORY: Respirations slightly shallow but regular, even, and unlabored. Lungs CTA throughout.   GI: Soft and non distended with normoactive bowel sounds present in all quadrants. No tenderness, rebound, guarding. No organomegaly.   EXTREMITIES: Mild bilateral LE edema, LE compression wraps in place. 2+ bilateral pedal pulses.   NEUROLOGIC: Alert and orientated x 3. CN II-XII grossly intact. No focal deficits.   MUSCULOSKELETAL: No joint swelling or tenderness.   SKIN: No jaundice. LE wrapped.     Data:  CMP  Recent Labs  Lab 01/09/18  0638 01/08/18  1822 01/08/18  1148 01/08/18  0556 01/07/18  1845  01/06/18  1733 01/06/18  0728  01/05/18  1109    136  --  134 135  < > 137 139  < > 140   POTASSIUM 3.8 4.4  --  3.5 3.5  < > 3.2* 3.4  < > 3.3*   CHLORIDE 92* 93*  --  94 93*  < > 96 102  < > 101   CO2 36* 38*  --  32 33*  < > 32 28  < > 32   ANIONGAP 5 5  --  8 9  < > 9 10  < > 6   GLC 98 123*  --  96 110*  < > 110* 97  < > 103*   BUN 16 14  --  12 10  < > 9 8  < > 10   CR 0.88 1.02  --  0.84 0.92  < > 0.82 0.79  < > 0.84   GFRESTIMATED 86 73  --  >90 82  < > >90 >90  < > >90   GFRESTBLACK >90 88  --  >90 >90  < > >90 >90  < > >90   CHERI 9.1 9.2  --  8.6 9.0  < > 9.1 8.9  < > 9.4   MAG 2.3 2.2 2.3  --  2.2  --  2.1 2.0  < > 2.0   PHOS  --   --  3.4  --  3.8  --  4.6* 4.0  < >  --    PROTTOTAL  --   --   --   --   --   --   --   --   --  7.6   ALBUMIN  --   --   --   --   --   --   --   --   --  3.1*   BILITOTAL  --   --   --   --   --   --   --   --   --  1.1   ALKPHOS  --   --   --   --   --   --   --   --   --  87   AST  --   --   --   --   --   --   --   --   --  16   ALT  --   --   --   --   --   --   --   --   --  16   < > = values in this interval not displayed.  CBC  Recent Labs  Lab 01/07/18  0906 01/06/18  0728 01/05/18  1109   WBC 7.9 7.2 6.5   RBC 4.71 4.31* 4.45   HGB 14.1 12.7* 13.4   HCT 44.9 40.8 42.5   MCV 95 95 96   MCH 29.9 29.5 30.1   MCHC 31.4* 31.1* 31.5   RDW 15.7* 15.7*  15.8*    202 182     INR  Recent Labs  Lab 01/09/18  0638 01/08/18  0556 01/07/18  0906 01/06/18  0728   INR 2.50* 2.24* 1.89* 1.92*         Shaila Grove, KARENA, APRN, FNP-BC  Advanced Heart Failure Nurse Practitioner  [facility]  [phone]

## 2018-01-09 NOTE — PLAN OF CARE
Problem: Patient Care Overview  Goal: Plan of Care/Patient Progress Review  Outcome: Improving  [id number]:     Reason for admission: cellulitis  Vitals: VSS ex intermittent tachycardic on 2 L humidified O2 NC  Activity: up assist x1  Pain: no c/o pain  Neuro: WNL  Cardiac: a fib, on tele, on warfarin  Respiratory: WNL ex diminished on 2 L O2  GI/: obese, rounded abd - last BM 1/3/18  Diet: 2 gm Na plus 1.5 L FR, good appetite  Lines: R PIV SL  Wounds: bilateral lower extremity cellulitis with blisters, drsg CDI  Labs/imaging: INR 2.5     New changes this shift: lasix gtt d/c'd, started on 60 mg Lasix po BID, lisinopril restarted for CHF, leg drsgs CDI. PRN senna 2 tabs given x1, please give again in evening shift if no BM     Plan: dc home tomorrow with home care services     Continue to monitor and follow POC

## 2018-01-09 NOTE — PLAN OF CARE
Problem: Patient Care Overview  Goal: Plan of Care/Patient Progress Review  Edema 5B: Pt with firm 1+ pitting edema in B LEs, skin red and dusky and dressings changed with RN. Plan to wear compression x 24 hours, though please remove If compression causes numbness, tingling, pain or becomes soiled. Rec: follow up with HH lymphedema.

## 2018-01-09 NOTE — PLAN OF CARE
Problem: Patient Care Overview  Goal: Plan of Care/Patient Progress Review  Outcome: Improving  VSS except intermittent tachycardia, pt on Tele with a-fib and receiving coumadin. Tolerating SPO2 above 95% at 2L NC. Up assist x 1 with walker. Denies pain/nausea. Urinal at bedside, no BM this shift. Low sodium diet with good appetite, compliant with fluid restriction. Bilateral lower extremities cellulitis with blisters, drsg CDI. Lasix gtt rate continues at 5mg/hr. Adequate UO and weight trending down. Possible discharge to home today. Continue with POC and update MD with any changes.

## 2018-01-09 NOTE — PLAN OF CARE
Problem: Patient Care Overview  Goal: Plan of Care/Patient Progress Review  Patient has been assessed for Home Oxygen needs. Oxygen readings:    *Pulse oximetry (SpO2) = 88% on room air at rest while awake.    *SpO2 improved to 92% on 2liters/minute at rest.    *SpO2 = % on room air during activity/with exercise. Not tested due to low SATS just in sitting and pt stating feeling SOB on room air.     *SpO2 improved to 90% on 2liters/minute during activity/with exercise.

## 2018-01-10 ENCOUNTER — DOCUMENTATION ONLY (OUTPATIENT)
Dept: ONCOLOGY | Facility: CLINIC | Age: 68
End: 2018-01-10

## 2018-01-10 ENCOUNTER — APPOINTMENT (OUTPATIENT)
Dept: OCCUPATIONAL THERAPY | Facility: CLINIC | Age: 68
DRG: 292 | End: 2018-01-10
Payer: COMMERCIAL

## 2018-01-10 VITALS
SYSTOLIC BLOOD PRESSURE: 100 MMHG | BODY MASS INDEX: 50.33 KG/M2 | WEIGHT: 315 LBS | OXYGEN SATURATION: 97 % | HEART RATE: 110 BPM | RESPIRATION RATE: 18 BRPM | TEMPERATURE: 96.5 F | DIASTOLIC BLOOD PRESSURE: 63 MMHG

## 2018-01-10 LAB
ANION GAP SERPL CALCULATED.3IONS-SCNC: 6 MMOL/L (ref 3–14)
BUN SERPL-MCNC: 23 MG/DL (ref 7–30)
CALCIUM SERPL-MCNC: 9.1 MG/DL (ref 8.5–10.1)
CHLORIDE SERPL-SCNC: 94 MMOL/L (ref 94–109)
CO2 SERPL-SCNC: 34 MMOL/L (ref 20–32)
CREAT SERPL-MCNC: 1.05 MG/DL (ref 0.66–1.25)
GFR SERPL CREATININE-BSD FRML MDRD: 70 ML/MIN/1.7M2
GLUCOSE BLDC GLUCOMTR-MCNC: 101 MG/DL (ref 70–99)
GLUCOSE BLDC GLUCOMTR-MCNC: 104 MG/DL (ref 70–99)
GLUCOSE BLDC GLUCOMTR-MCNC: 105 MG/DL (ref 70–99)
GLUCOSE SERPL-MCNC: 95 MG/DL (ref 70–99)
INR PPP: 2.69 (ref 0.86–1.14)
MAGNESIUM SERPL-MCNC: 2.6 MG/DL (ref 1.6–2.3)
POTASSIUM SERPL-SCNC: 4 MMOL/L (ref 3.4–5.3)
SODIUM SERPL-SCNC: 133 MMOL/L (ref 133–144)

## 2018-01-10 PROCEDURE — 25000132 ZZH RX MED GY IP 250 OP 250 PS 637: Performed by: FAMILY MEDICINE

## 2018-01-10 PROCEDURE — 97140 MANUAL THERAPY 1/> REGIONS: CPT | Mod: GO

## 2018-01-10 PROCEDURE — 83735 ASSAY OF MAGNESIUM: CPT | Performed by: FAMILY MEDICINE

## 2018-01-10 PROCEDURE — 00000146 ZZHCL STATISTIC GLUCOSE BY METER IP

## 2018-01-10 PROCEDURE — 85610 PROTHROMBIN TIME: CPT | Performed by: FAMILY MEDICINE

## 2018-01-10 PROCEDURE — 80048 BASIC METABOLIC PNL TOTAL CA: CPT | Performed by: FAMILY MEDICINE

## 2018-01-10 PROCEDURE — 36415 COLL VENOUS BLD VENIPUNCTURE: CPT | Performed by: FAMILY MEDICINE

## 2018-01-10 PROCEDURE — 25000132 ZZH RX MED GY IP 250 OP 250 PS 637: Performed by: NURSE PRACTITIONER

## 2018-01-10 PROCEDURE — 40000133 ZZH STATISTIC OT WARD VISIT

## 2018-01-10 PROCEDURE — 25000132 ZZH RX MED GY IP 250 OP 250 PS 637: Performed by: STUDENT IN AN ORGANIZED HEALTH CARE EDUCATION/TRAINING PROGRAM

## 2018-01-10 RX ORDER — SULFAMETHOXAZOLE/TRIMETHOPRIM 800-160 MG
1 TABLET ORAL 2 TIMES DAILY
Qty: 1 TABLET | Refills: 0 | Status: SHIPPED | OUTPATIENT
Start: 2018-01-10 | End: 2018-01-11

## 2018-01-10 RX ORDER — WARFARIN SODIUM 10 MG/1
10 TABLET ORAL
Status: DISCONTINUED | OUTPATIENT
Start: 2018-01-10 | End: 2018-01-10 | Stop reason: HOSPADM

## 2018-01-10 RX ORDER — WARFARIN SODIUM 10 MG/1
10 TABLET ORAL ONCE
Qty: 1 TABLET | Refills: 0 | Status: SHIPPED | OUTPATIENT
Start: 2018-01-10 | End: 2019-02-19

## 2018-01-10 RX ORDER — LACTOBACILLUS RHAMNOSUS GG 10B CELL
1 CAPSULE ORAL 2 TIMES DAILY
Qty: 14 CAPSULE | Refills: 0 | Status: SHIPPED | OUTPATIENT
Start: 2018-01-10 | End: 2018-01-15

## 2018-01-10 RX ORDER — LISINOPRIL 2.5 MG/1
2.5 TABLET ORAL 2 TIMES DAILY
Qty: 30 TABLET | Refills: 0 | Status: SHIPPED | OUTPATIENT
Start: 2018-01-10 | End: 2018-01-15

## 2018-01-10 RX ORDER — LACTOBACILLUS RHAMNOSUS GG 10B CELL
1 CAPSULE ORAL 2 TIMES DAILY
Qty: 6 CAPSULE | Refills: 0 | Status: SHIPPED | OUTPATIENT
Start: 2018-01-10 | End: 2018-01-10

## 2018-01-10 RX ORDER — FUROSEMIDE 20 MG
60 TABLET ORAL 2 TIMES DAILY
Qty: 60 TABLET | Refills: 0 | Status: SHIPPED | OUTPATIENT
Start: 2018-01-10 | End: 2018-02-09

## 2018-01-10 RX ADMIN — FUROSEMIDE 60 MG: 40 TABLET ORAL at 08:12

## 2018-01-10 RX ADMIN — ATORVASTATIN CALCIUM 40 MG: 40 TABLET, FILM COATED ORAL at 08:12

## 2018-01-10 RX ADMIN — SPIRONOLACTONE 25 MG: 25 TABLET ORAL at 08:12

## 2018-01-10 RX ADMIN — SULFAMETHOXAZOLE AND TRIMETHOPRIM 1 TABLET: 800; 160 TABLET ORAL at 10:46

## 2018-01-10 RX ADMIN — LISINOPRIL 2.5 MG: 2.5 TABLET ORAL at 08:12

## 2018-01-10 RX ADMIN — POTASSIUM CHLORIDE 40 MEQ: 750 TABLET, EXTENDED RELEASE ORAL at 08:12

## 2018-01-10 RX ADMIN — LEVOTHYROXINE SODIUM 175 MCG: 100 TABLET ORAL at 08:12

## 2018-01-10 RX ADMIN — Medication 1 CAPSULE: at 08:12

## 2018-01-10 RX ADMIN — MULTIPLE VITAMINS W/ MINERALS TAB 1 TABLET: TAB at 08:12

## 2018-01-10 RX ADMIN — ASPIRIN 81 MG: 81 TABLET, COATED ORAL at 08:12

## 2018-01-10 RX ADMIN — Medication 1 G: at 08:12

## 2018-01-10 NOTE — DISCHARGE INSTRUCTIONS
HEART FAILURE DISCHARGE INSTRUCTIONS:  1.  We would like you to see someone in the [facility] clinic within one week of hospital discharge.  2.  Please weigh yourself daily.  Call the cardiology clinic and ask to speak with a CORE nurse if you were to experience any weight gain (3 lbs overnight or 5 lbs in one week).  3.  Cardiology clinic number is [phone].  4.  Limit salt intake to less than 2g per day.  5.  Limit fluid intake to less than 2L per day.  6.  Reminder that you are scheduled for your sleep study on 1/18.    Oxygen Provider:  Arranged through [employer], contact number [phone].  If you have any questions or concerns please call the oxygen company directly.

## 2018-01-10 NOTE — PROGRESS NOTES
Received intake call for home oxygen at 4:13pm on 1/9/18, letting us know that the patient wasn't discharging until tomorrow and order would not be signed until tomorrow as well.  1/10/18-  Reviewed patient's chart; Patient qualifies under Medicare guidelines and all documentation is in the chart except a signed order.   8:56am Spoke with care coordinator,Kimberlyn, confirmed we received the order, but despite her call this morning the order still is not signed. Also had her update the dx in the order. But told her we had everything else, she understood and said she would reach out to the doctor again to get the order signed.   9:04am- Called to offer choice and patient is okay with [facility] setting them up. Discussed equipment in depth and he decided to go with the tank system. Informed him that we were still waiting on the order to be signed, but once it was we would bring portability to bedside. We were hoping it would be in the next 2 hours, but we are aware he is leaving at 2pm, so we will have it to him before then. He understood.  9:27am- Upon doing paperwork, realized the patient uses BIPAP and called Kimberlyn to clarify if they wanted the patient to use oxygen at night. She said she had to check and would call me back.   10:12am- Kimberlyn called back to say the doctors would like the patient to use O2 at night, and I confirmed we will make sure he is educated on that. Also told her my  was headed to [facility] to do an O2 delivery and then would be over to bedside with portable tank. So within the next hour he should be there. She understood.

## 2018-01-10 NOTE — PLAN OF CARE
Problem: Patient Care Overview  Goal: Plan of Care/Patient Progress Review  Occupational Therapy Discharge Summary    Reason for therapy discharge:    Discharged to home with home therapy.    Progress towards therapy goal(s). See goals on Care Plan in [facility] electronic health record for goal details.  Goals partially met.  Barriers to achieving goals:   discharge from facility.    Therapy recommendation(s):    Continued therapy is recommended.  Rationale/Recommendations:  Continue skilled home lymphedema therapy to promote wound healing and skin integrity. .

## 2018-01-10 NOTE — PLAN OF CARE
Problem: Patient Care Overview  Goal: Plan of Care/Patient Progress Review  Denies pain. Up with SBA. Maintaining saturations on 2L per NC. Hopeful for discharge today with home oxygen.

## 2018-01-10 NOTE — PROGRESS NOTES
Pt in stable condition and ready for discharge. PIV removed, catheter intact, pressure held to site, bleeding resolved, and dressing placed over site. Pt discharging home with home O2. Discharge instructions and education reviewed with patient. Pt verbalizes understanding of all instructions. All belongings packed and sent with pt. Transportation arranged and pt left the floor at 1400.

## 2018-01-10 NOTE — PLAN OF CARE
Problem: Patient Care Overview  Goal: Plan of Care/Patient Progress Review  Outcome: Improving  Pt AO. VSS on 2L. Up with SBA to BR. SAWANT. Denies pain or nausea. Tolerating PO with good appetite, on 1.5L fluid restriction. Voiding. Reports BM this evening. Lymph wraps on. Anticipate d/c home tomorrow.

## 2018-01-10 NOTE — PLAN OF CARE
Problem: Patient Care Overview  Goal: Plan of Care/Patient Progress Review  Edema 5A: GCB replaced after wound cares completed by RN, pt continues to demo firm 1+ pitting edema in B LEs, continues to benefit from GCB to promote wound healing and skin integrity. Recommend follow up with home health lymphedema for continued wraps and eventual fit into compression stocking. Remove any compression if it causes numbness, tingling, pain, or becomes soiled.

## 2018-01-10 NOTE — PLAN OF CARE
Problem: Patient Care Overview  Goal: Plan of Care/Patient Progress Review  Physical Therapy Discharge Summary    Reason for therapy discharge:    Discharged to home with home therapy.    Progress towards therapy goal(s). See goals on Care Plan in [facility] electronic health record for goal details.  Goals partially met.  Barriers to achieving goals:   discharge from facility.    Therapy recommendation(s):    Continued therapy is recommended.  Rationale/Recommendations:  Progress deficits towards independence with functional mobility.

## 2018-01-11 ENCOUNTER — CARE COORDINATION (OUTPATIENT)
Dept: CARE COORDINATION | Facility: CLINIC | Age: 68
End: 2018-01-11

## 2018-01-11 ENCOUNTER — CARE COORDINATION (OUTPATIENT)
Dept: FAMILY MEDICINE | Facility: CLINIC | Age: 68
End: 2018-01-11

## 2018-01-11 ENCOUNTER — TELEPHONE (OUTPATIENT)
Dept: FAMILY MEDICINE | Facility: CLINIC | Age: 68
End: 2018-01-11

## 2018-01-11 DIAGNOSIS — I50.22 CHRONIC SYSTOLIC CONGESTIVE HEART FAILURE (H): Primary | ICD-10-CM

## 2018-01-11 DIAGNOSIS — E66.01 MORBID OBESITY (H): ICD-10-CM

## 2018-01-11 DIAGNOSIS — I89.0 LYMPHEDEMA: ICD-10-CM

## 2018-01-11 LAB
BACTERIA SPEC CULT: NO GROWTH
BACTERIA SPEC CULT: NO GROWTH
SPECIMEN SOURCE: NORMAL
SPECIMEN SOURCE: NORMAL

## 2018-01-11 NOTE — TELEPHONE ENCOUNTER
Message routed to PCP. Please place order and give to your forms PCS to fax.    Carla Gonzales RN

## 2018-01-11 NOTE — TELEPHONE ENCOUNTER
[facility] phone call message - order or referral request for patient:     Order or referral being requested: Order needed for Bariatric lift chair      Additional Comments: MUST include diagnosis codes as to the reason patient needs the chair.  Please fax to     OK to leave a message on voice mail? Yes    Primary language: English      needed? No    Call taken on January 11, 2018 at 2:09 PM by Jhoana Morgan

## 2018-01-11 NOTE — PROGRESS NOTES
Patient was discharged from [facility] on 1/10            [facility]  Discharge Summary     Clarke Moreira MRN# [id number]   Age: 67 year old YOB: 1950      Date of Admission:                                      1/5/2018  Date of Discharge:                                      1/10/2018  Admitting Physician:                                   Devorah Torres DO  Discharge Physician:                                  Dhiraj Varela MD  Contact: [phone]  Discharging Service:                                   [facility]

## 2018-01-11 NOTE — TELEPHONE ENCOUNTER
.[facility] phone call message - order or referral request for patient:     Order or referral being requested: home care order      Additional Comments:[facility] home care nurse requesting order skill nursing  To visit 2 time  A week for 2 weeks one time a week for5 weeks 3 as needed for wound care also lymphedema  Nurse to resume  care    OK to leave a message on voice mail? Yes    Primary language: English      needed? No    Call taken on January 11, 2018 at 4:40 PM by Fredo Varghese

## 2018-01-12 ENCOUNTER — CARE COORDINATION (OUTPATIENT)
Dept: CARDIOLOGY | Facility: CLINIC | Age: 68
End: 2018-01-12

## 2018-01-12 ENCOUNTER — TELEPHONE (OUTPATIENT)
Dept: PHARMACY | Facility: OTHER | Age: 68
End: 2018-01-12

## 2018-01-12 NOTE — TELEPHONE ENCOUNTER
RN returned call to Ne.    Per Ne, patient was bought a broken bariatric lift and hospital bed about three months ago for the patient. Did not go through insurance. Lift is broken. Ne needs DME order to be placed for Bariatric Lift chair with diagnosis of Obesity, CHF, and Lymphedema by provider. She will handle ordering the chair and has found a raj to afford the lift without going through insurance. Just needing PCP to place order with associated diagnoses.    Message routed to PCP. Please place DME order and give to your forms PCS to fax to Ne from [email or web address] at [phone].    Carla Gonzales RN

## 2018-01-12 NOTE — TELEPHONE ENCOUNTER
[facility]: Post-Discharge Note  SITUATION                                                      Admission:    Admission Date: 01/05/18   Reason for Admission: Bilateral Cellulitis of lower extremities  Discharge:    Discharge Date: 01/10/18   Discharge Diagnosis: Bilateral cellulitis, hypervolemia, CHF   Discharge Service: [facility]   Discharge Plan: Follow up in clinic with PCP, Cardiology     BACKGROUND                                                      This is a 67 year old male with a past medical history significant for HFrEF, chronic venous stasis, atrial fibrillation, DM-Type-2, PVD, morbid obesity, and depression who presented to the hospital for concerns of lower extremities cellulitis.       Patient has a history of chronic stasis edema with skin changes. He reported that he was told to come into the hospital by his Nurse Manager due to concerns of cellulitis. He was seen by his Lymphedema nurse a few days ago and was noted to have worsening lower extremities erythema with bullae formation. He denies any fever, chills, nausea, and vomiting.        Clarke has a history of systolic heart failure, obesity and persistent fluid overload despite diuresis. He reports or worsening shortness of breath, orthopnea, paroxysmal nocturnal dyspnea, abdominal distention and worsening lower extremities edema. He was recently seen by his Cardiologist who recommended right heart catheterization to evaluate for various components resulting to his worsening volume overload.       During my encounter with patient in the ED he was hemodynamically stable and on room air. Infectious and inflammatory work-up ordered by the ED provider were all unremarkable. Chest xray was consistent with pulmonary congestion. He was transferred to the inpatient floor for further management of cellulitis and volume overload.   ASSESSMENT      Patient reports symptoms are: Improved  Does the patient have all of their medications?:  Yes  Does patient know what their new medications are for?: Yes  Does paient have a follow-up appointment scheduled?: Yes  Does patient have any other questions or concerns?: No      PLAN                                                      Outpatient Plan:  Discharged home, working with Ne to get Bariatric lift chair, follow up visits scheduled    Future Appointments  Date Time Provider Department Center   1/15/2018 10:20 AM Tj Palumbo [facility]   1/15/2018 10:40 AM [facility]   1/18/2018 11:00 AM Rebekah Reddy APRN [facility]   1/29/2018 10:20 AM Jesús Lagos DPM [facility]           Carla Gonzales RN

## 2018-01-12 NOTE — TELEPHONE ENCOUNTER
"Ne was calling to speak with the nurse, Carla, stating she was returning her call. No documentation in chart regarding an attempted call. I offered to take a message and have the nurse call her back. She declined, stated \"I'll just call back\" then hung up the phone.   "

## 2018-01-12 NOTE — TELEPHONE ENCOUNTER
Returned call to home care nurse, unable to reach. Left VM with verbal orders per protocol as requested. Left callback number for questions.    Carla Gonzales RN

## 2018-01-12 NOTE — PROGRESS NOTES
"Tell me how you have been doing since you were discharged from the hospital.  \"Eh, considering I've been home for 24hr, I'm ok\"     ACTIVITY  How is your activity tolerance?     fair     ASSISTANCE  Do you have someone at home to assist you with your daily activities?  Patient states he [facility] home care     MEDICATIONS  I would like to review your discharge medications and answer any questions you may have about them and also make sure you have all the medications that are new to you, and discuss any changes that were made to your pre-hospital medications.     Is someone helping you to set up your medications?     self     FOLLOW UP  Do you have a follow up appointment with your provider?   What other discharge instructions do you have?   Are you to get labs, procedures or tests before you see your provider?      You are scheduled to see Jada Mckeon NP on 1/19/18 for New CORE at 1pm, labs at 12:30pm.           CONTACT INFORMATION  Please feel free to call us with any other questions or symptoms that are concerning for you at [phone], if it is after 4:30 in the afternoon, or a weekend please call [phone] and ask for the on call specialist.  We want to do everything we can to help prevent you needing to return to the ED, so please do not hesitate to call us.       HEART FAILURE PATIENTS  Please weigh yourself daily and record your weights.  If you gain 2 pounds in 24 hours or 5 pounds in one week please call [facility] at [phone].     DIET  It is recommended you follow a 2000 mg low sodium diet, avoid processed food, canned food and fast food restaurants.       "

## 2018-01-12 NOTE — TELEPHONE ENCOUNTER
MTM referral from: Transitions of Care (recent hospital discharge or ED visit)    MTM referral outreach attempt #1 on January 12, 2018 at 9:31 AM      Outcome: Patient is not interested at this time because they are a [facility], will route to [facility] Pharmacist/Provider as an FYI. Thank you for the referral.     Edna Resendiz MTM Coordinator

## 2018-01-15 ENCOUNTER — OFFICE VISIT (OUTPATIENT)
Dept: PHARMACY | Facility: CLINIC | Age: 68
End: 2018-01-15
Payer: COMMERCIAL

## 2018-01-15 ENCOUNTER — OFFICE VISIT (OUTPATIENT)
Dept: FAMILY MEDICINE | Facility: CLINIC | Age: 68
End: 2018-01-15
Payer: COMMERCIAL

## 2018-01-15 ENCOUNTER — DOCUMENTATION ONLY (OUTPATIENT)
Dept: CARE COORDINATION | Facility: CLINIC | Age: 68
End: 2018-01-15

## 2018-01-15 VITALS
DIASTOLIC BLOOD PRESSURE: 70 MMHG | HEART RATE: 83 BPM | TEMPERATURE: 97.9 F | WEIGHT: 315 LBS | OXYGEN SATURATION: 96 % | BODY MASS INDEX: 50.86 KG/M2 | RESPIRATION RATE: 18 BRPM | SYSTOLIC BLOOD PRESSURE: 109 MMHG

## 2018-01-15 DIAGNOSIS — L03.119 CELLULITIS OF LOWER EXTREMITY, UNSPECIFIED LATERALITY: ICD-10-CM

## 2018-01-15 DIAGNOSIS — I48.19 PERSISTENT ATRIAL FIBRILLATION (H): ICD-10-CM

## 2018-01-15 DIAGNOSIS — E11.9 TYPE 2 DIABETES MELLITUS WITHOUT COMPLICATION, WITHOUT LONG-TERM CURRENT USE OF INSULIN (H): ICD-10-CM

## 2018-01-15 DIAGNOSIS — I50.22 CHRONIC SYSTOLIC CONGESTIVE HEART FAILURE (H): Primary | ICD-10-CM

## 2018-01-15 LAB
% GRANULOCYTES: 57.9 %G (ref 40–75)
BUN SERPL-MCNC: 20.2 MG/DL (ref 7–21)
CALCIUM SERPL-MCNC: 10 MG/DL (ref 8.5–10.1)
CHLORIDE SERPLBLD-SCNC: 95 MMOL/L (ref 98–110)
CO2 SERPL-SCNC: 24.7 MMOL/L (ref 20–32)
CREAT SERPL-MCNC: 0.9 MG/DL (ref 0.7–1.3)
GFR SERPL CREATININE-BSD FRML MDRD: >90 ML/MIN/1.7 M2
GLUCOSE SERPL-MCNC: 115.1 MG'DL (ref 70–99)
GRANULOCYTES #: 4 K/UL (ref 1.6–8.3)
HCT VFR BLD AUTO: 49.8 % (ref 40–53)
HEMOGLOBIN: 15.3 G/DL (ref 13.3–17.7)
INR PPP: 3
LYMPHOCYTES # BLD AUTO: 2.2 K/UL (ref 0.8–5.3)
LYMPHOCYTES NFR BLD AUTO: 32.6 %L (ref 20–48)
MAGNESIUM SERPL-MCNC: 2.4 MG/DL (ref 1.6–2.3)
MCH RBC QN AUTO: 30.1 PG (ref 26.5–35)
MCHC RBC AUTO-ENTMCNC: 30.7 G/DL (ref 32–36)
MCV RBC AUTO: 97.8 FL (ref 78–100)
MID #: 0.7 K/UL (ref 0–2.2)
MID %: 9.5 %M (ref 0–20)
NT-PROBNP SERPL-MCNC: 1142 PG/ML (ref 0–125)
PHOSPHATE SERPL-MCNC: 3.3 MG/DL (ref 2.5–4.5)
PLATELET # BLD AUTO: 279 K/UL (ref 150–450)
POTASSIUM SERPL-SCNC: 4.3 MMOL/DL (ref 3.3–4.5)
RBC # BLD AUTO: 5.09 M/UL (ref 4.4–5.9)
SODIUM SERPL-SCNC: 132.4 MMOL/L (ref 132.6–141.4)
WBC # BLD AUTO: 6.9 K/UL (ref 4–11)

## 2018-01-15 RX ORDER — ATORVASTATIN CALCIUM 40 MG/1
40 TABLET, FILM COATED ORAL DAILY
Qty: 30 TABLET | Refills: 11 | Status: SHIPPED | OUTPATIENT
Start: 2018-01-15 | End: 2019-01-15

## 2018-01-15 RX ORDER — LISINOPRIL 5 MG/1
5 TABLET ORAL 2 TIMES DAILY
Qty: 60 TABLET | Refills: 3 | Status: SHIPPED | OUTPATIENT
Start: 2018-01-15 | End: 2018-06-15

## 2018-01-15 NOTE — PROGRESS NOTES
Hospitalization Follow-up Visit         [facility]       Hospital Follow-up Visit:    Hospital:  [facility]   Date of Admission: 1/5/2018  Date of Discharge: 1/10/2018  Reason(s) for Admission: CHF, cellulitis/venous stasis            Problems taking medications regularly:  Atorvastatin Rx has not been refilled recently by Pharmacy       Post Discharge Medication Reconciliation: discharge medications reconciled and changed, per note/orders (see AVS).       Problems adhering to non-medication therapy:  None       Medications reviewed by: by PharmD       Other treatments - now has O2 at home. Using BiPAP nightly.    Summary of hospitalization:  [facility] discharge summary reviewed  Diagnostic Tests/Treatments reviewed.  Follow up needed: Blood work, CORE clinic, Sleep clinic.  Other Healthcare Providers Involved in Patient s Care:         Homecare and Wound Nurse CORE clinic  Update since discharge: improved.   Plan of care communicated with patient       ## Concern for bilateral LE cellulitis:   ## Chronic Venous stasis with dermatitis: Improved   Improved venous stasis after aggressive diuresis; remained hemodynamically stable. Lymphedema team, Physical therapy, and occupational therapy assessed while inpt.  Placed on 5 day course of Bactrim 800-160 mg BID, with last dose evening after discharge. Will continue lactobacillus BID for 3 days.       ## acute on chronic HFrEF:   ## Pulmonary congestion:   Improved dyspnea, orthopnea, paroxysmal dyspnea, and edema after agressive diuresis. Down 20 L from admit. Cardiology was consulted and helped guide treatment. Initial Lasix gtt was changed to PO 60 mg BID. Noted need for right heart cath and ischemic eval as outpatient. Continued Metoprolol  mg daily, and Spironolactone 25 mg daily, and started lisinopril 2.5mg PO BID. O2 Walk test showed need for home O2 of 2L and Order placed. Continue PTA Potassium replacement.   - Will follow up with  CORE clinic as outpt  - Will have BMP, mag, phos drawn with INR check     ## Atrial Fibrillation: Stable   CHADS-VaSC 5. On tele durring admit; intermittently having heart rates as high as 140, not sustained. Continue PTA Metoprolol 100 mg daily. Pharmacy dosed warfarin inpt, and recs warfarin 10 mg TONIGHT ONLY, then continue PTA dose of 12.5 mg QD. INR goal of 2.0-3.0. INR assessed daily and came within range prior to d/c. Will have INR checked Fri.          ## Obesity Hypoventilation syndrome:  ## Sleep Apnea:   Improved with diuresis and continued CPAP with home settings      ## Coronary Artery Disease:   ## PAD:   ## Essential Hypertension:   PTA Aspirin 81 mg daily, high intensity Lipitor 40 mg daily, and BP medications per above were continued      ## Hypothyroidism:    TSH (11/2017) elevated at 4.89, repeat inpt was 6.8. Increased Levothyroxine during admit from 150 mcg daily to 175 mcg QD.   - Recommend recheck TSH as outpatient in 6 weeks       ## DM-Type-2: Controlled   A1c (11/2017): 6.3. Was on MSSI while inpt.   - PTA metformin 500 mg BID was held while inpt and resumed upon D/C.          ## BPH: Chronic, stable   PTA Flomax 0.4 mg daily continued                     Review of Systems:   CONSTITUTIONAL: no fatigue, no unexpected change in weight  SKIN: no worrisome rashes, no worrisome moles, no worrisome lesions  EYES: no acute vision problems or changes  ENT: no ear problems, no mouth problems, no throat problems  RESP: no significant cough, no shortness of breath  CV: no chest pain, no palpitations, no new or worsening peripheral edema  GI: no nausea, no vomiting, no constipation, no diarrhea            Physical Exam:     Vitals:    01/15/18 1031   BP: 109/70   Pulse: 83   Resp: 18   Temp: 97.9  F (36.6  C)   TempSrc: Oral   SpO2: 96%   Weight: (!) 375 lb (170.1 kg)     Body mass index is 50.86 kg/(m^2).    Wt Readings from Last 5 Encounters:   01/15/18 (!) 375 lb (170.1 kg)   01/10/18 (!) 371 lb  1.6 oz (168.3 kg)   12/26/17 (!) 437 lb 9.6 oz (198.5 kg)   12/21/17 (!) 421 lb 12.8 oz (191.3 kg)   12/04/17 (!) 409 lb (185.5 kg)       Wt 375 !!  Gen: no distress  Lungs: clear  Cor: RRR, no S3 S4 murmur or rub  Abd: soft, nontender, no HSM, pannus soft, non-indurated, non-erythematous  Ext: legs wrapped           Results:     Results for orders placed or performed in visit on 01/15/18   Basic Metabolic Panel ([facility])   Result Value Ref Range    Urea Nitrogen 20.2 7.0 - 21.0 mg/dL    Calcium 10.0 8.5 - 10.1 mg/dL    Chloride 95.0 (L) 98.0 - 110.0 mmol/L    Carbon Dioxide 24.7 20.0 - 32.0 mmol/L    Creatinine 0.9 0.7 - 1.3 mg/dL    Glucose 115.1 (H) 70.0 - 99.0 mg'dL    Potassium 4.3 3.3 - 4.5 mmol/dL    Sodium 132.4 (L) 132.6 - 141.4 mmol/L    GFR Estimate >90 >60.0 mL/min/1.7 m2    GFR Estimate If Black >90 >60.0 mL/min/1.7 m2   CBC with Diff Plt ([facility])   Result Value Ref Range    WBC 6.9 4.0 - 11.0 K/uL    Lymphocytes # 2.2 0.8 - 5.3 K/uL    % Lymphocytes 32.6 20.0 - 48.0 %L    Mid # 0.7 0.0 - 2.2 K/uL    Mid % 9.5 0.0 - 20.0 %M    GRANULOCYTES # 4.0 1.6 - 8.3 K/uL    % Granulocytes 57.9 40.0 - 75.0 %G    RBC 5.09 4.40 - 5.90 M/uL    Hemoglobin 15.3 13.3 - 17.7 g/dL    Hematocrit 49.8 40.0 - 53.0 %    MCV 97.8 78.0 - 100.0 fL    MCH 30.1 26.5 - 35.0 pg    MCHC 30.7 (L) 32.0 - 36.0 g/dL    Platelets 279.0 150.0 - 450.0 K/uL         Assessment and Plan     1. Chronic systolic congestive heart failure (H)  Dramatic diuresis. Wt 437 --> 375  Increase lisionopril 2.5 bid --> 5 bid  Continue with lasix 60 BID, spironolactone 25 BID, metoprolol 100 QD  Monitor K+. Reduce K supplement if needed.    Forms needed for O2 / BiPAP    - lisinopril (PRINIVIL/ZESTRIL) 5 MG tablet; Take 1 tablet (5 mg) by mouth 2 times daily  Dispense: 60 tablet; Refill: 3  - Basic Metabolic Panel ([facility])  - Phosphorus  - N terminal pro BNP outpatient  - Magnesium      2. Cellulitis of lower extremity / venous stasis  Done with  antibiotics  Legs improved  - CBC with Diff Plt ([place])    3. Type 2 diabetes mellitus without complication, without long-term current use of insulin (H)  - atorvastatin (LIPITOR) 40 MG tablet; Take 1 tablet (40 mg) by mouth daily  Dispense: 30 tablet; Refill: 11    4. BiPAP / sleep therapy / CHF  Patient has an appt with Sleep Clinic on 1/18/18, but he needs a letter by 1/24/18 documenting compliance with the BiPAP equipment.   He is using the BiPAP faithfully per patient. I do not have machine data to verify.  Also using O2 since discharge  Pt is compliant with BiPAP. He is using it over 4 hours / day.   It has improved his sleep and dyspnea.      5. RTC 2 weeks  Will see CORE clinic and Sleep clinic before that  INR, BMP  Check TSH in Feb/Mar 2018          E&M code to be billed if TCM cannot be: 90107  Type of decision making: High complexity (70391)      Options for treatment and follow-up care were reviewed with the patient  Clarke Moreira   engaged in the decision making process and verbalized understanding of the options discussed and agreed with the final plan.      Matthias Sanchez MD

## 2018-01-15 NOTE — MR AVS SNAPSHOT
After Visit Summary   1/15/2018    Clarke Moreira    MRN: [id number]           Patient Information     Date Of Birth          1950        Visit Information        Provider Department      1/15/2018 10:20 AM [facility]        Today's Diagnoses     Persistent atrial fibrillation (H)        Type 2 diabetes mellitus without complication, without long-term current use of insulin (H)           Follow-ups after your visit        Your next 10 appointments already scheduled     Feb 15, 2018  1:00 PM CST   New Sleep Patient with REAGAN Claros CNP   [facility] ([facility])    [address] [place] [id number] [phone]            Feb 26, 2018 11:00 AM CST   (Arrive by 10:45 AM)   CORE NEW with REAGAN Okeefe CNP   [facility] ([facility])    [address] [place] [address]  [address] [place] [id number] [phone]            May 21, 2018 10:20 AM CDT   (Arrive by 10:05 AM)   RETURN FOOT/ANKLE with Jesús Lagos DPM   [facility] ([facility])    [address] [facility]  4th Floor  [facility] [address] [phone]              Who to contact     Please call your clinic at [phone] to:    Ask questions about your health    Make or cancel appointments    Discuss your medicines    Learn about your test results    Speak to your doctor   If you have compliments or concerns about an experience at your clinic, or if you wish to file a complaint, please contact [facility] Patient Relations at [phone] or email us at [email or web address]         Additional Information About Your Visit        MyChart Information     [employer] is an electronic gateway that provides easy, online access to your medical records. With [employer], you can request a clinic  appointment, read your test results, renew a prescription or communicate with your care team.     To sign up for [employer] visit the website at [email or web address]   You will be asked to enter the access code listed below, as well as some personal information. Please follow the directions to create your username and password.     Your access code is: [email or web address]  Expires: 2018 12:33 PM     Your access code will  in 90 days. If you need help or a new code, please contact your [facility] or call [phone] for assistance.        Care EveryWhere ID     This is your Care EveryWhere ID. This could be used by other organizations to access your [facility] medical records  [id number]         Blood Pressure from Last 3 Encounters:   01/15/18 109/70   01/10/18 100/63   17 145/81    Weight from Last 3 Encounters:   01/15/18 (!) 375 lb (170.1 kg)   01/10/18 (!) 371 lb 1.6 oz (168.3 kg)   17 (!) 437 lb 9.6 oz (198.5 kg)              We Performed the Following     INR ([facility])          Today's Medication Changes          These changes are accurate as of 1/15/18 11:59 PM.  If you have any questions, ask your nurse or doctor.               These medicines have changed or have updated prescriptions.        Dose/Directions    lisinopril 5 MG tablet   Commonly known as:  PRINIVIL/ZESTRIL   This may have changed:    - medication strength  - how much to take   Used for:  Chronic systolic congestive heart failure (H)   Changed by:  Matthias Sanchez MD        Dose:  5 mg   Take 1 tablet (5 mg) by mouth 2 times daily   Quantity:  60 tablet   Refills:  3       potassium chloride SA 20 MEQ CR tablet   Commonly known as:  K-DUR/KLOR-CON M   Indication:  hypokalemia   This may have changed:    - how much to take  - when to take this  - Another medication with the same name was removed. Continue taking this medication, and follow the directions you see here.   Used for:   Hypokalemia        Dose:  40 mEq   Take 2 tablets (40 mEq) by mouth daily   Quantity:  60 tablet   Refills:  11         Stop taking these medicines if you haven't already. Please contact your care team if you have questions.     lactobacillus rhamnosus (GG) capsule   Stopped by:  [facility]                Where to get your medicines      These medications were sent to [id number] - [place], [place] - [address]  [address] [facility] [address]     Phone:  [phone]     atorvastatin 40 MG tablet    lisinopril 5 MG tablet                Primary Care Provider Office Phone # Fax #    Matthias Sanchez [phone]       [address]  [facility] [address]        Equal Access to Services     TYLER [place] : Felisa tomo Sodillan, waaxda luqadaha, qaybta kaalmada adeegyanacho, vasyl casarez . So [facility] [place] [phone].    ATENCIÓN: Si habla español, tiene a kim disposición servicios gratuitos de asistencia lingüística. [facility] [phone].    We comply with applicable federal civil rights laws and [place] laws. We do not discriminate on the basis of race, color, national origin, age, disability, sex, sexual orientation, or gender identity.            Thank you!     Thank you for choosing [facility]  for your care. Our goal is always to provide you with excellent care. Hearing back from our patients is one way we can continue to improve our services. Please take a few minutes to complete the written survey that you may receive in the mail after your visit with us. Thank you!             Your Updated Medication List - Protect others around you: Learn how to safely use, store and throw away your medicines at www.disposemymeds.org.          This list is accurate as of 1/15/18 11:59 PM.  Always use your most recent med list.                   Brand Name Dispense Instructions for use Diagnosis    aspirin 81 MG tablet      30 tablet    Take 1 tablet (81 mg) by mouth daily    Essential hypertension       atorvastatin 40 MG tablet    LIPITOR    30 tablet    Take 1 tablet (40 mg) by mouth daily    Type 2 diabetes mellitus without complication, without long-term current use of insulin (H)       furosemide 20 MG tablet    LASIX    60 tablet    Take 3 tablets (60 mg) by mouth 2 times daily    Chronic congestive heart failure, unspecified congestive heart failure type (H)       levothyroxine 175 MCG tablet    SYNTHROID/LEVOTHROID    30 tablet    Take 1 tablet (175 mcg) by mouth daily    Hypothyroidism, unspecified type       lisinopril 5 MG tablet    PRINIVIL/ZESTRIL    60 tablet    Take 1 tablet (5 mg) by mouth 2 times daily    Chronic systolic congestive heart failure (H)       metFORMIN 500 MG tablet    GLUCOPHAGE    60 tablet    Take 1 tablet (500 mg) by mouth 2 times daily (with meals)    Type 2 diabetes mellitus without complication, without long-term current use of insulin (H)       metoprolol succinate 100 MG 24 hr tablet    TOPROL-XL    30 tablet    Take 1 tablet (100 mg) by mouth daily    Essential hypertension with goal blood pressure less than 140/90       multivitamin, therapeutic with minerals Tabs tablet     30 each    Take 1 tablet by mouth daily    Type 2 diabetes mellitus without complication, without long-term current use of insulin (H)       omega 3 1000 MG Caps     30 capsule    Take 1 g by mouth daily    Hyperlipidemia LDL goal <100       * order for DME     2 each    Equipment being ordered: Other: Velcro Compression stockings.  Treatment Diagnosis: bilateral lymphedema.    Lymphedema of extremity       * order for DME     1 Units    Equipment being ordered: Bariatric Lift chair Diagnosis - morbid obesity, CHF, Lymphedema  Fax to Ne from [employer] at [phone].    Chronic systolic congestive heart failure (H), Lymphedema, Morbid obesity (H)       polyethylene glycol Packet    MIRALAX/GLYCOLAX    15 packet     Take 17 g by mouth daily as needed for constipation    Constipation, unspecified constipation type       potassium chloride SA 20 MEQ CR tablet    K-DUR/KLOR-CON M    60 tablet    Take 2 tablets (40 mEq) by mouth daily    Hypokalemia       senna-docusate 8.6-50 MG per tablet    SENOKOT-S;PERICOLACE    60 tablet    Take 1-2 tablets by mouth 2 times daily as needed for constipation    Constipation, unspecified constipation type       spironolactone 25 MG tablet    ALDACTONE    60 tablet    Take 1 tablet (25 mg) by mouth 2 times daily    Chronic systolic congestive heart failure (H)       tamsulosin 0.4 MG capsule    FLOMAX    30 capsule    Take 1 capsule (0.4 mg) by mouth daily    Urinary retention       * warfarin 5 MG tablet    COUMADIN    75 tablet    Take 2.5 tablets (12.5 mg) by mouth daily    Persistent atrial fibrillation (H)       * warfarin 10 MG tablet    COUMADIN    1 tablet    Take 1 tablet (10 mg) by mouth once for 1 dose    Chronic atrial fibrillation (H)       * Notice:  This list has 4 medication(s) that are the same as other medications prescribed for you. Read the directions carefully, and ask your doctor or other care provider to review them with you.

## 2018-01-15 NOTE — PATIENT INSTRUCTIONS
1. Chronic systolic congestive heart failure (H)  Dramatic diuresis. Wt 437 --> 375  Increase lisionopril 2.5 bid --> 5 bid  Continue with lasix 60 BID, spironolactone 25 BID, metoprolol 100 QD  Monitor K+. Reduce K supplement if needed.    Forms needed for O2 / BiPAP    - lisinopril (PRINIVIL/ZESTRIL) 5 MG tablet; Take 1 tablet (5 mg) by mouth 2 times daily  Dispense: 60 tablet; Refill: 3  - Basic Metabolic Panel ([facility])  - Phosphorus  - N terminal pro BNP outpatient  - Magnesium      2. Cellulitis of lower extremity / venous stasis  Done with antibiotics  Legs improved  - CBC with Diff Plt ([facility])    3. Type 2 diabetes mellitus without complication, without long-term current use of insulin (H)  - atorvastatin (LIPITOR) 40 MG tablet; Take 1 tablet (40 mg) by mouth daily  Dispense: 30 tablet; Refill: 11    4. RTC 2 weeks  Will see CORE clinic and Sleep clinic before that  INR, BMP

## 2018-01-15 NOTE — LETTER
January 16, 2018      Clarke Moreira  [address]  [facility] [address]        Dear Clarke,    Thank you for getting your care at [facility]. Please see below for your test results.  Labs are looking good compared to other times  Hang in there!!      Resulted Orders   Basic Metabolic Panel ([facility])   Result Value Ref Range    Urea Nitrogen 20.2 7.0 - 21.0 mg/dL    Calcium 10.0 8.5 - 10.1 mg/dL    Chloride 95.0 (L) 98.0 - 110.0 mmol/L    Carbon Dioxide 24.7 20.0 - 32.0 mmol/L    Creatinine 0.9 0.7 - 1.3 mg/dL    Glucose 115.1 (H) 70.0 - 99.0 mg'dL    Potassium 4.3 3.3 - 4.5 mmol/dL    Sodium 132.4 (L) 132.6 - 141.4 mmol/L    GFR Estimate >90 >60.0 mL/min/1.7 m2    GFR Estimate If Black >90 >60.0 mL/min/1.7 m2   Phosphorus   Result Value Ref Range    Phosphorus 3.3 2.5 - 4.5 mg/dL   N terminal pro BNP outpatient   Result Value Ref Range    N-Terminal Pro Bnp 1142 (H) 0 - 125 pg/mL      Comment:         Reference range shown and results flagged as abnormal are for the outpatient,   non acute settings. Establishing a baseline value for each individual patient   is useful for follow-up.  Suggested inpatient cut points for confirming diagnosis of CHF in an acute   setting are:   >450 pg/mL (age 18 to less than 50)   >900 pg/mL (age 50 to less than 75)   >1800 pg/mL (75 yrs and older)  An inpatient or emergency department NT-proPBNP <300 pg/mL effectively rules   out acute CHF, with 99% negative predictive value.      Magnesium   Result Value Ref Range    Magnesium 2.4 (H) 1.6 - 2.3 mg/dL   CBC with Diff Plt ([facility])   Result Value Ref Range    WBC 6.9 4.0 - 11.0 K/uL    Lymphocytes # 2.2 0.8 - 5.3 K/uL    % Lymphocytes 32.6 20.0 - 48.0 %L    Mid # 0.7 0.0 - 2.2 K/uL    Mid % 9.5 0.0 - 20.0 %M    GRANULOCYTES # 4.0 1.6 - 8.3 K/uL    % Granulocytes 57.9 40.0 - 75.0 %G    RBC 5.09 4.40 - 5.90 M/uL    Hemoglobin 15.3 13.3 - 17.7 g/dL    Hematocrit 49.8 40.0 - 53.0 %    MCV 97.8 78.0 - 100.0 fL     MCH 30.1 26.5 - 35.0 pg    MCHC 30.7 (L) 32.0 - 36.0 g/dL    Platelets 279.0 150.0 - 450.0 K/uL           Sincerely,    Matthias Sanchez MD

## 2018-01-15 NOTE — PROGRESS NOTES
[facility] now requests orders and shares plan of care/discharge summaries for some patients through [employer].  Please REPLY TO THIS MESSAGE in order to give authorization for orders when needed.  This is considered a verbal order, you will still receive a faxed copy of orders for signature.  Thank you for your assistance in improving collaboration for our patients.     ORDER  OT lymphedema therapy order 1w1, 3w1, 2w3.        PLAN OF CARE    The plan will include falls prevention plan, monitor and treat pain, monitor skin integrity, continence management, monitor for s/s of depression, diabetic foot care, monitor for symptoms of heart failure and to achieve the following goals.  GOALS TO BE MET BY DISCHARGE  1. Pt/CG will be independent in edema management to maintain reduced limb girth on days home care staff are unavailable.    2. Pt/CG will be independent in donning/doffing, wearing schedule, and care of compression garment to maintain edema long term. Progressing  3. Circumferential measurement will be reduced by 8 percent in BLE to promote wound healing/improved skin integrity and enable pt to perform safe transfers and improved functional mobility.    Please respond to this message as soon as possible. Thank you!    Romana [email or web address]  [phone]

## 2018-01-15 NOTE — PROGRESS NOTES
Pharmacy Progress Note: Transitions of Care     History of Hospitalization     Clarke Moreira was referred by Matthias Koch  for transitional care and medication management following their recent hospitalization.       HISTORY OF Hospitalization    Reason for hospitalization: CHF   Date of ADMIT: 1/5/18   Date of DISCHARGE 1/10/18   Other hospitalizations in past year:      Hospital problem list/ specific issues to address:  1.    Cellulitis  2.     CHF        SUBJECTIVE     Patient is in clinic today for Hospital follow up with Dr. Sanchez.  He was hospitalized for LE cellulitis.  He was treated and discharged with Bactrim 800-160 mg BID.       He continues to receive diuretic for his heart failure.  Lasix now at 60 mg twice daily:  AM and PM    Clarke is confused as to whether she should remain on a statin Tx       Duo neb will be dced at the pharmacy.     Blood pressure has been low since the hospital.        Anticoagulation:  Warfarin dose 12.5 mg daily         OBJECTIVE  Creatinine   Date Value Ref Range Status   01/10/2018 1.05 0.66 - 1.25 mg/dL Final     Lab Results   Component Value Date    INR 3.0 01/15/2018    INR 2.69 01/10/2018    INR 2.50 01/09/2018    INR 2.24 01/08/2018    INR 1.89 01/07/2018    INR 1.92 01/06/2018         Liver Function Studies -   Recent Labs   Lab Test  01/05/18   1109   PROTTOTAL  7.6   ALBUMIN  3.1*   BILITOTAL  1.1   ALKPHOS  87   AST  16   ALT  16     Immunization History   Administered Date(s) Administered     Influenza (IIV3) PF 12/15/2005, 03/08/2007, 10/01/2010, 12/02/2011, 09/06/2017     Mantoux Tuberculin Skin Test 10/09/2017     Pneumo Conj 13-V (2010&after) 11/27/2017     TDAP Vaccine (Boostrix) 11/27/2017     Tdap (Adacel,Boostrix) 03/08/2007           ASSESSMENT     Drug Therapy Problems  Patient s recent hospitalization was likely/not likely related to medication use.        1)   CHF;     DTP degree:2               Lasix dose has been increased to 60 mg BID  and pt is diuresing.    Continues to lose water weight - good refnal function today.      Continues to take spironolactone and ACE-I and Beta-blocker    Recommend to begin targeting higher dose for both lisinopril and metorpolol.  Recommend to start by increasing lisinopril today to 5 mg BID and monitor BP.     2) polypharmacy  ;    DTP degree:1            Atorvastatin refilled today     Duo neb was taken of the med list- pt does not use this.     No longer taking an antibiotic    3)  Anticoagulation  ;     DTP degree:2            Warfarin 12.5 mg daily    Recheck in 4 weeks.           Health literacy           General: Low/Moderate/High/Unknown  Comment: high    PLAN     Outpatient medication list has been updated to reflects patient s current medication use.       1.recommend  Lisinopril 5 mg BID  2. Continue on warfarin dose 12.5 mg daily.  Home care nurse to continue to monitor INR      Follow-up:  Plan to follow up with PCP         MTM and medication reconciliation have been completed by pharmacy. Medication assessment and plan address patient s specific concerns and goals.     Dr. Sanchez was provided our recommendations in clinic today  before/after they saw patient and was available for supervision during the visit and is the authorizing prescriber for this visit through the pharmacist collaborative practice agreement.     Thank you for the opportunity to participate in the care of this patient.  Tj Palumbo, Pharm.D.  Appointment length: 30

## 2018-01-15 NOTE — MR AVS SNAPSHOT
After Visit Summary   1/15/2018    Clarke Moreira    MRN: [id number]           Patient Information     Date Of Birth          1950        Visit Information        Provider Department      1/15/2018 10:40 AM Matthias Sanchez MD [facility]        Today's Diagnoses     Chronic systolic congestive heart failure (H)    -  1    Type 2 diabetes mellitus without complication, without long-term current use of insulin (H)        Screening for condition           Follow-ups after your visit        Your next 10 appointments already scheduled     Jan 18, 2018 11:00 AM CST   New Sleep Patient with REAGAN Claros CNP   [facility] [place], Sleep Study ([facility])    [address] [place] [id number] [phone]            Jan 19, 2018 12:30 PM CST   Lab with [facility]    Health Lab ([facility])    [address]  1st Floor  [facility] [id number] [phone]            Jan 19, 2018  1:00 PM CST   (Arrive by 12:45 PM)   CORE NEW with REAGAN Kaur CNP   [facility] ([facility])    [address]  [address]  [facility] [id number] [phone]            Jan 29, 2018 10:20 AM CST   (Arrive by 10:05 AM)   NEW FOOT/ANKLE with Jesús Lagos DPM   [facility] ([facility])    [address]  4th Floor  [facility] [id number] [phone]              Who to contact     Please call your clinic at [phone] to:    Ask questions about your health    Make or cancel appointments    Discuss your medicines    Learn about your test results    Speak to your doctor   If you have compliments or concerns about an experience at your clinic, or if you wish to file a complaint, please contact [facility] Patient Relations at [phone] or email us at  [email or web address]         Additional Information About Your Visit        [employer] Information     [employer] is an electronic gateway that provides easy, online access to your medical records. With [employer], you can request a clinic appointment, read your test results, renew a prescription or communicate with your care team.     To sign up for [employer] visit the website at [email or web address]   You will be asked to enter the access code listed below, as well as some personal information. Please follow the directions to create your username and password.     Your access code is: [email or web address]  Expires: 2018 12:33 PM     Your access code will  in 90 days. If you need help or a new code, please contact your [facility] or call [phone] for assistance.        Care EveryWhere ID     This is your Care EveryWhere ID. This could be used by other organizations to access your [place] medical records  [id number]        Your Vitals Were     Pulse Temperature Respirations Pulse Oximetry BMI (Body Mass Index)       83 97.9  F (36.6  C) (Oral) 18 96% 50.86 kg/m2        Blood Pressure from Last 3 Encounters:   01/15/18 109/70   01/10/18 100/63   17 145/81    Weight from Last 3 Encounters:   01/15/18 (!) 375 lb (170.1 kg)   01/10/18 (!) 371 lb 1.6 oz (168.3 kg)   17 (!) 437 lb 9.6 oz (198.5 kg)              We Performed the Following     Basic Metabolic Panel ([facility])     CBC with Diff Plt ([facility])     Magnesium     N terminal pro BNP outpatient     Phosphorus          Today's Medication Changes          These changes are accurate as of: 1/15/18 11:34 AM.  If you have any questions, ask your nurse or doctor.               These medicines have changed or have updated prescriptions.        Dose/Directions    lisinopril 5 MG tablet   Commonly known as:  PRINIVIL/ZESTRIL   This may have changed:    - medication strength  - how much to take   Used for:   Chronic systolic congestive heart failure (H)   Changed by:  Matthias Sanchez MD        Dose:  5 mg   Take 1 tablet (5 mg) by mouth 2 times daily   Quantity:  60 tablet   Refills:  3       potassium chloride SA 20 MEQ CR tablet   Commonly known as:  K-DUR/KLOR-CON M   Indication:  hypokalemia   This may have changed:    - how much to take  - when to take this  - Another medication with the same name was removed. Continue taking this medication, and follow the directions you see here.   Used for:  Hypokalemia   Changed by:  Matthias Sanchez MD        Dose:  40 mEq   Take 2 tablets (40 mEq) by mouth daily   Quantity:  60 tablet   Refills:  11         Stop taking these medicines if you haven't already. Please contact your care team if you have questions.     lactobacillus rhamnosus (GG) capsule   Stopped by:  Tj Palumbo                Where to get your medicines      These medications were sent to [id number] - [place], [place] - [address]  [address] [facility] [address]     Phone:  [phone]     atorvastatin 40 MG tablet    lisinopril 5 MG tablet                Primary Care Provider Office Phone # Fax #    Matthias Sanchez [phone]       [address] [address]        Equal Access to Services     TY[place]ER [place] : Hadii kell ku hadasho Soomaali, waaxda luqadaha, qaybta kaalmada adeegyada, waxay kulwant casarez . So [facility] [phone].    ATENCIÓN: Si habla español, tiene a kim disposición servicios gratuitos de asistencia lingüística. Llsneha al [phone].    We comply with applicable federal civil rights laws and [place] laws. We do not discriminate on the basis of race, color, national origin, age, disability, sex, sexual orientation, or gender identity.            Thank you!     Thank you for choosing [facility]  for your care. Our goal is always to provide you with excellent care. Hearing back from  our patients is one way we can continue to improve our services. Please take a few minutes to complete the written survey that you may receive in the mail after your visit with us. Thank you!             Your Updated Medication List - Protect others around you: Learn how to safely use, store and throw away your medicines at www.disposemymeds.org.          This list is accurate as of: 1/15/18 11:34 AM.  Always use your most recent med list.                   Brand Name Dispense Instructions for use Diagnosis    aspirin 81 MG tablet     30 tablet    Take 1 tablet (81 mg) by mouth daily    Essential hypertension       atorvastatin 40 MG tablet    LIPITOR    30 tablet    Take 1 tablet (40 mg) by mouth daily    Type 2 diabetes mellitus without complication, without long-term current use of insulin (H)       furosemide 20 MG tablet    LASIX    60 tablet    Take 3 tablets (60 mg) by mouth 2 times daily    Chronic congestive heart failure, unspecified congestive heart failure type (H)       levothyroxine 175 MCG tablet    SYNTHROID/LEVOTHROID    30 tablet    Take 1 tablet (175 mcg) by mouth daily    Hypothyroidism, unspecified type       lisinopril 5 MG tablet    PRINIVIL/ZESTRIL    60 tablet    Take 1 tablet (5 mg) by mouth 2 times daily    Chronic systolic congestive heart failure (H)       metFORMIN 500 MG tablet    GLUCOPHAGE    60 tablet    Take 1 tablet (500 mg) by mouth 2 times daily (with meals)    Type 2 diabetes mellitus without complication, without long-term current use of insulin (H)       metoprolol succinate 100 MG 24 hr tablet    TOPROL-XL    30 tablet    Take 1 tablet (100 mg) by mouth daily    Essential hypertension with goal blood pressure less than 140/90       multivitamin, therapeutic with minerals Tabs tablet     30 each    Take 1 tablet by mouth daily    Type 2 diabetes mellitus without complication, without long-term current use of insulin (H)       omega 3 1000 MG Caps     30 capsule    Take 1 g by  mouth daily    Hyperlipidemia LDL goal <100       * order for DME     2 each    Equipment being ordered: Other: Velcro Compression stockings.  Treatment Diagnosis: bilateral lymphedema.    Lymphedema of extremity       * order for DME     1 Units    Equipment being ordered: Bariatric Lift chair Diagnosis - morbid obesity, CHF, Lymphedema  Fax to Ne from [employer] at [phone].    Chronic systolic congestive heart failure (H), Lymphedema, Morbid obesity (H)       polyethylene glycol Packet    MIRALAX/GLYCOLAX    15 packet    Take 17 g by mouth daily as needed for constipation    Constipation, unspecified constipation type       potassium chloride SA 20 MEQ CR tablet    K-DUR/KLOR-CON M    60 tablet    Take 2 tablets (40 mEq) by mouth daily    Hypokalemia       senna-docusate 8.6-50 MG per tablet    SENOKOT-S;PERICOLACE    60 tablet    Take 1-2 tablets by mouth 2 times daily as needed for constipation    Constipation, unspecified constipation type       spironolactone 25 MG tablet    ALDACTONE    60 tablet    Take 1 tablet (25 mg) by mouth 2 times daily    Chronic systolic congestive heart failure (H)       tamsulosin 0.4 MG capsule    FLOMAX    30 capsule    Take 1 capsule (0.4 mg) by mouth daily    Urinary retention       * warfarin 5 MG tablet    COUMADIN    75 tablet    Take 2.5 tablets (12.5 mg) by mouth daily    Persistent atrial fibrillation (H)       * warfarin 10 MG tablet    COUMADIN    1 tablet    Take 1 tablet (10 mg) by mouth once for 1 dose    Chronic atrial fibrillation (H)       * Notice:  This list has 4 medication(s) that are the same as other medications prescribed for you. Read the directions carefully, and ask your doctor or other care provider to review them with you.

## 2018-01-16 ENCOUNTER — PRE VISIT (OUTPATIENT)
Dept: CARDIOLOGY | Facility: CLINIC | Age: 68
End: 2018-01-16

## 2018-01-16 ENCOUNTER — TELEPHONE (OUTPATIENT)
Dept: FAMILY MEDICINE | Facility: CLINIC | Age: 68
End: 2018-01-16

## 2018-01-16 DIAGNOSIS — I50.20 SYSTOLIC HEART FAILURE (H): Primary | ICD-10-CM

## 2018-01-16 NOTE — TELEPHONE ENCOUNTER
Returned call to home care nurse to give verbal orders per protocol as requested. Nurse verbalized understanding.    Carla Gonzales RN

## 2018-01-16 NOTE — TELEPHONE ENCOUNTER
[facility] phone call message - order or referral request for patient:     Order or referral being requested: Verbal Orders for: Lymphedema Therapy      Additional Comments: 1 time /week for 1 week; 3 times/week for 1 week; 2 times/week for 3 weeks.    OK to leave a message on voice mail? Yes    Primary language: English      needed? No    Call taken on January 16, 2018 at 10:30 AM by Kemi Benitez

## 2018-01-17 ENCOUNTER — DOCUMENTATION ONLY (OUTPATIENT)
Dept: CARE COORDINATION | Facility: CLINIC | Age: 68
End: 2018-01-17

## 2018-01-17 NOTE — PROGRESS NOTES
Dear Dr. Matthias Sanchez      Medicare Home Health regulations requires [facility] to notify the Physician when the plan for visits has been altered.  We have provided fewer visits than ordered.  We are notifying you of a Missed Visit.  Clarke Moreira; MRN [id number]  Missed Visit  Is SN  Dates of missed services 1/15/18  Reason: Patient cancelled   Sincerely [facility]  Marilee Reese  [phone]

## 2018-01-29 ENCOUNTER — OFFICE VISIT (OUTPATIENT)
Dept: ORTHOPEDICS | Facility: CLINIC | Age: 68
End: 2018-01-29
Payer: COMMERCIAL

## 2018-01-29 DIAGNOSIS — E11.65 TYPE 2 DIABETES MELLITUS WITH HYPERGLYCEMIA, WITHOUT LONG-TERM CURRENT USE OF INSULIN (H): ICD-10-CM

## 2018-01-29 DIAGNOSIS — E11.42 DIABETIC POLYNEUROPATHY ASSOCIATED WITH TYPE 2 DIABETES MELLITUS (H): ICD-10-CM

## 2018-01-29 DIAGNOSIS — M25.552 PAIN IN JOINT INVOLVING PELVIC REGION AND THIGH, LEFT: Primary | ICD-10-CM

## 2018-01-29 DIAGNOSIS — B35.1 DERMATOPHYTOSIS OF NAIL: ICD-10-CM

## 2018-01-29 NOTE — LETTER
1/29/2018       RE: Clarke Moreira  [address]  [facility] [address]     Dear Colleague,    Thank you for referring your patient, Clarke Moreira, to the [facility] at [facility]. Please see a copy of my visit note below.    Date of Service: 1/29/2018    Chief Complaint:   Chief Complaint   Patient presents with     Consult     Type 2 Diabetic. Pt stated that he has been in the hospital for the last few weeks. Pt stated that upon his release he was instructed to see Dr. Lagos for orthotics. Lymphedema. Toe nail trimming.         HPI: Clarke is a 67 year old male who presents today for a diabetic foot exam and management.  He relates that he was recently in the hospital with cellulitis and heart failure.  He is currently getting a lymphedema wraps from his home nurse 3 times a week.  His legs are wrapped with a short stretch wrap today.  These were not removed.  He relates that the swelling has significantly decreased since using the wraps.  He relates that there is no redness and no wounds underneath the wraps.   his nails are elongated and thickened.  He does relate that they are sometimes painful for him.  He gets his primary care, however he has not had a recent A1c.  He is a type II diabetic with control with metformin.  He is currently ambulating in DH shoes.  He relates that he has not ever had a pair of diabetic shoes, and would like a pair. He does have some pain in the lateral left leg that he describes as a firework.     Review of Systems: No n/v/d/f/c/ns/sob/cp    PMH:   Past Medical History:   Diagnosis Date     Basal cell carcinoma        PSxH:   Past Surgical History:   Procedure Laterality Date     BIOPSY OF SKIN LESION       MOHS MICROGRAPHIC PROCEDURE       PICC INSERTION Right 09/24/2017    5fr TL Bard PICC, 51cm (1cm external) in the R medial brachial vein w/ tip in the SVC.       Allergies: Review of patient's  "allergies indicates no known allergies.    SH:   Social History     Social History     Marital status: Single     Spouse name: N/A     Number of children: N/A     Years of education: N/A     Occupational History     Not on file.     Social History Main Topics     Smoking status: Never Smoker     Smokeless tobacco: Never Used     Alcohol use No      Comment: Former alcohol abuse. Quit 70s then quit later in 80s-present     Drug use: No      Comment: \"Nothing these days can compare to the LSD in the 70s\"     Sexual activity: Not on file     Other Topics Concern     Not on file     Social History Narrative    Lives in an apartment in 16th floor near hospital.  Has elevators, unable to use stairs. Not able to shop; has things delivered to him including food. Difficulty completing cleaning. Goes to PCP once yearly for annual check up and medication review.       FH:   Family History   Problem Relation Age of Onset     Depression Mother      CANCER No family hx of      No family history of skin cancer       Objective:    Physical exam was limited today by the lymphedema wraps on both legs.  I can see the the distal aspect of both digits.  DP and PT pulses cannot be palpated.  Diminished pedal hair.  Protective sensation cannot be tested today.  He does relate some numbness in the bottom of both feet.  Nails are thickened, elongated, yellow, brittle, with subungual debris debris consistent with onychomycosis ×10.       Assessment: Type 2 diabetes with neuropathy.    Onychomycosis ×10.    Lymphedema      Plan:  - Pt seen and evaluated.  - Nails were debrided x 10. Scant bleeding from the right 4th and left 5th digits. These were cleansed and covered with a bandaid. No further treatment needed.  - Rx for diabetic shoes.   - Referral to Sports for left thigh pain.   - See again in 3 months.              Again, thank you for allowing me to participate in the care of your patient.      Sincerely,    Jesús Lagos DPM      "

## 2018-01-29 NOTE — PROGRESS NOTES
Date of Service: 1/29/2018    Chief Complaint:   Chief Complaint   Patient presents with     Consult     Type 2 Diabetic. Pt stated that he has been in the hospital for the last few weeks. Pt stated that upon his release he was instructed to see Dr. Lagos for orthotics. Lymphedema. Toe nail trimming.         HPI: Clarke is a 67 year old male who presents today for a diabetic foot exam and management.  He relates that he was recently in the hospital with cellulitis and heart failure.  He is currently getting a lymphedema wraps from his home nurse 3 times a week.  His legs are wrapped with a short stretch wrap today.  These were not removed.  He relates that the swelling has significantly decreased since using the wraps.  He relates that there is no redness and no wounds underneath the wraps.   his nails are elongated and thickened.  He does relate that they are sometimes painful for him.  He gets his primary care, however he has not had a recent A1c.  He is a type II diabetic with control with metformin.  He is currently ambulating in DH shoes.  He relates that he has not ever had a pair of diabetic shoes, and would like a pair. He does have some pain in the lateral left leg that he describes as a firework.     Review of Systems: No n/v/d/f/c/ns/sob/cp    PMH:   Past Medical History:   Diagnosis Date     Basal cell carcinoma        PSxH:   Past Surgical History:   Procedure Laterality Date     BIOPSY OF SKIN LESION       MOHS MICROGRAPHIC PROCEDURE       PICC INSERTION Right 09/24/2017    5fr TL Bard PICC, 51cm (1cm external) in the R medial brachial vein w/ tip in the SVC.       Allergies: Review of patient's allergies indicates no known allergies.    SH:   Social History     Social History     Marital status: Single     Spouse name: N/A     Number of children: N/A     Years of education: N/A     Occupational History     Not on file.     Social History Main Topics     Smoking status: Never Smoker     Smokeless  "tobacco: Never Used     Alcohol use No      Comment: Former alcohol abuse. Quit 70s then quit later in 80s-present     Drug use: No      Comment: \"Nothing these days can compare to the LSD in the 70s\"     Sexual activity: Not on file     Other Topics Concern     Not on file     Social History Narrative    Lives in an apartment in 16th floor near hospital.  Has elevators, unable to use stairs. Not able to shop; has things delivered to him including food. Difficulty completing cleaning. Goes to PCP once yearly for annual check up and medication review.       FH:   Family History   Problem Relation Age of Onset     Depression Mother      CANCER No family hx of      No family history of skin cancer       Objective:    Physical exam was limited today by the lymphedema wraps on both legs.  I can see the the distal aspect of both digits.  DP and PT pulses cannot be palpated.  Diminished pedal hair.  Protective sensation cannot be tested today.  He does relate some numbness in the bottom of both feet.  Nails are thickened, elongated, yellow, brittle, with subungual debris debris consistent with onychomycosis ×10.      Assessment: Type 2 diabetes with neuropathy.    Onychomycosis ×10.    Lymphedema      Plan:  - Pt seen and evaluated.  - Nails were debrided x 10. Scant bleeding from the right 4th and left 5th digits. These were cleansed and covered with a bandaid. No further treatment needed.  - Rx for diabetic shoes.   - Referral to Sports for left thigh pain.   - See again in 3 months.            "

## 2018-01-29 NOTE — NURSING NOTE
Reason For Visit:   Chief Complaint   Patient presents with     Consult     Type 2 Diabetic. Pt stated that he has been in the hospital for the last few weeks. Pt stated that upon his release he was instructed to see Dr. Lagos for orthotics. Lymphedema. Toe nail trimming.            Referring:   RYNE CHAN      Pain Assessment  Patient Currently in Pain: Denies (Pt stated that he has had a blossoming and numbness pain in his left thigh when he twists and turns and has to vocalize the impact. )              Current Outpatient Prescriptions   Medication Sig Dispense Refill     atorvastatin (LIPITOR) 40 MG tablet Take 1 tablet (40 mg) by mouth daily 30 tablet 11     lisinopril (PRINIVIL/ZESTRIL) 5 MG tablet Take 1 tablet (5 mg) by mouth 2 times daily 60 tablet 3     order for DME Equipment being ordered: Bariatric Lift chair  Diagnosis - morbid obesity, CHF, Lymphedema    Fax to Ne from [employer] at [phone]. 1 Units 0     furosemide (LASIX) 20 MG tablet Take 3 tablets (60 mg) by mouth 2 times daily 60 tablet 0     levothyroxine (SYNTHROID/LEVOTHROID) 175 MCG tablet Take 1 tablet (175 mcg) by mouth daily 30 tablet 0     spironolactone (ALDACTONE) 25 MG tablet Take 1 tablet (25 mg) by mouth 2 times daily 60 tablet 3     warfarin (COUMADIN) 5 MG tablet Take 2.5 tablets (12.5 mg) by mouth daily 75 tablet 11     potassium chloride SA (K-DUR/KLOR-CON M) 20 MEQ CR tablet Take 2 tablets (40 mEq) by mouth daily (Patient taking differently: Take 20 mEq by mouth 2 times daily ) 60 tablet 11     senna-docusate (SENOKOT-S;PERICOLACE) 8.6-50 MG per tablet Take 1-2 tablets by mouth 2 times daily as needed for constipation 60 tablet 11     metFORMIN (GLUCOPHAGE) 500 MG tablet Take 1 tablet (500 mg) by mouth 2 times daily (with meals) 60 tablet 11     metoprolol (TOPROL-XL) 100 MG 24 hr tablet Take 1 tablet (100 mg) by mouth daily 30 tablet 11     multivitamin, therapeutic with minerals (MULTI-VITAMIN) TABS tablet Take  1 tablet by mouth daily 30 each 11     omega 3 1000 MG CAPS Take 1 g by mouth daily 30 capsule 11     polyethylene glycol (MIRALAX/GLYCOLAX) Packet Take 17 g by mouth daily as needed for constipation 15 packet 11     tamsulosin (FLOMAX) 0.4 MG capsule Take 1 capsule (0.4 mg) by mouth daily 30 capsule 11     order for DME Equipment being ordered: Other: Velcro Compression stockings.   Treatment Diagnosis: bilateral lymphedema. 2 each 0     aspirin 81 MG tablet Take 1 tablet (81 mg) by mouth daily 30 tablet 0        No Known Allergies

## 2018-01-29 NOTE — MR AVS SNAPSHOT
After Visit Summary   1/29/2018    Clarke Moreira    MRN: [id number]           Patient Information     Date Of Birth          1950        Visit Information        Provider Department      1/29/2018 10:20 AM Jesús Lagos DPM [facility]         Follow-ups after your visit        Your next 10 appointments already scheduled     Jan 31, 2018 12:30 PM CST   Lab with [facility]   [facility] ([facility])    [address]  1st Floor  [facility] [id number] [phone]            Jan 31, 2018  1:00 PM CST   (Arrive by 12:45 PM)   CORE NEW with Adriane Talamantes PA-C   [facility] ([facility])    [address]  [address]  [place] [id number] [phone]            May 21, 2018 10:20 AM CDT   (Arrive by 10:05 AM)   RETURN FOOT/ANKLE with Jesús Lagos DPM   [facility] ([facility])    [address]  4th Floor  [place] [address] [phone]              Who to contact     Please call your clinic at [phone] to:    Ask questions about your health    Make or cancel appointments    Discuss your medicines    Learn about your test results    Speak to your doctor   If you have compliments or concerns about an experience at your clinic, or if you wish to file a complaint, please contact [facility] Patient Relations at [phone] or email us at [email or web address]         Additional Information About Your Visit        MyChart Information     [employer] is an electronic gateway that provides easy, online access to your medical records. With [employer], you can request a clinic appointment, read your test results, renew a prescription or communicate with your care team.     To sign up for [employer] visit the website at [email or web address]   You will be asked to enter the access code listed  below, as well as some personal information. Please follow the directions to create your username and password.     Your access code is: [email or web address]  Expires: 2018 12:33 PM     Your access code will  in 90 days. If you need help or a new code, please contact your [facility] or call [phone] for assistance.        Care EveryWhere ID     This is your Care EveryWhere ID. This could be used by other organizations to access your [facility] medical records  [id number]         Blood Pressure from Last 3 Encounters:   01/15/18 109/70   01/10/18 100/63   17 145/81    Weight from Last 3 Encounters:   01/15/18 (!) 170.1 kg (375 lb)   01/10/18 (!) 168.3 kg (371 lb 1.6 oz)   17 (!) 198.5 kg (437 lb 9.6 oz)              Today, you had the following     No orders found for display         Today's Medication Changes          These changes are accurate as of 18 10:20 AM.  If you have any questions, ask your nurse or doctor.               These medicines have changed or have updated prescriptions.        Dose/Directions    potassium chloride SA 20 MEQ CR tablet   Commonly known as:  K-DUR/KLOR-CON M   Indication:  hypokalemia   This may have changed:    - how much to take  - when to take this   Used for:  Hypokalemia        Dose:  40 mEq   Take 2 tablets (40 mEq) by mouth daily   Quantity:  60 tablet   Refills:  11                Primary Care Provider Office Phone # Fax #    Matthias Sanchez [phone]       [address] [address]        Equal Access to Services     ELA RICO AH: Felisa Cotton, shelton gutierrez, vasyl duarte. So [facility] [phone].    ATENCIÓN: Si habla español, tiene a kim disposición servicios gratuitos de asistencia lingüística. Alexis pablo [phone].    We comply with applicable federal civil rights laws and [place] laws. We do not  discriminate on the basis of race, color, national origin, age, disability, sex, sexual orientation, or gender identity.            Thank you!     Thank you for choosing [facility]  for your care. Our goal is always to provide you with excellent care. Hearing back from our patients is one way we can continue to improve our services. Please take a few minutes to complete the written survey that you may receive in the mail after your visit with us. Thank you!             Your Updated Medication List - Protect others around you: Learn how to safely use, store and throw away your medicines at www.disposemymeds.org.          This list is accurate as of 1/29/18 10:20 AM.  Always use your most recent med list.                   Brand Name Dispense Instructions for use Diagnosis    aspirin 81 MG tablet     30 tablet    Take 1 tablet (81 mg) by mouth daily    Essential hypertension       atorvastatin 40 MG tablet    LIPITOR    30 tablet    Take 1 tablet (40 mg) by mouth daily    Type 2 diabetes mellitus without complication, without long-term current use of insulin (H)       furosemide 20 MG tablet    LASIX    60 tablet    Take 3 tablets (60 mg) by mouth 2 times daily    Chronic congestive heart failure, unspecified congestive heart failure type (H)       levothyroxine 175 MCG tablet    SYNTHROID/LEVOTHROID    30 tablet    Take 1 tablet (175 mcg) by mouth daily    Hypothyroidism, unspecified type       lisinopril 5 MG tablet    PRINIVIL/ZESTRIL    60 tablet    Take 1 tablet (5 mg) by mouth 2 times daily    Chronic systolic congestive heart failure (H)       metFORMIN 500 MG tablet    GLUCOPHAGE    60 tablet    Take 1 tablet (500 mg) by mouth 2 times daily (with meals)    Type 2 diabetes mellitus without complication, without long-term current use of insulin (H)       metoprolol succinate 100 MG 24 hr tablet    TOPROL-XL    30 tablet    Take 1 tablet (100 mg) by mouth daily    Essential hypertension with goal  blood pressure less than 140/90       multivitamin, therapeutic with minerals Tabs tablet     30 each    Take 1 tablet by mouth daily    Type 2 diabetes mellitus without complication, without long-term current use of insulin (H)       omega 3 1000 MG Caps     30 capsule    Take 1 g by mouth daily    Hyperlipidemia LDL goal <100       * order for DME     2 each    Equipment being ordered: Other: Velcro Compression stockings.  Treatment Diagnosis: bilateral lymphedema.    Lymphedema of extremity       * order for DME     1 Units    Equipment being ordered: Bariatric Lift chair Diagnosis - morbid obesity, CHF, Lymphedema  Fax to Ne from [employer] at [phone].    Chronic systolic congestive heart failure (H), Lymphedema, Morbid obesity (H)       polyethylene glycol Packet    MIRALAX/GLYCOLAX    15 packet    Take 17 g by mouth daily as needed for constipation    Constipation, unspecified constipation type       potassium chloride SA 20 MEQ CR tablet    K-DUR/KLOR-CON M    60 tablet    Take 2 tablets (40 mEq) by mouth daily    Hypokalemia       senna-docusate 8.6-50 MG per tablet    SENOKOT-S;PERICOLACE    60 tablet    Take 1-2 tablets by mouth 2 times daily as needed for constipation    Constipation, unspecified constipation type       spironolactone 25 MG tablet    ALDACTONE    60 tablet    Take 1 tablet (25 mg) by mouth 2 times daily    Chronic systolic congestive heart failure (H)       tamsulosin 0.4 MG capsule    FLOMAX    30 capsule    Take 1 capsule (0.4 mg) by mouth daily    Urinary retention       warfarin 5 MG tablet    COUMADIN    75 tablet    Take 2.5 tablets (12.5 mg) by mouth daily    Persistent atrial fibrillation (H)       * Notice:  This list has 2 medication(s) that are the same as other medications prescribed for you. Read the directions carefully, and ask your doctor or other care provider to review them with you.

## 2018-01-31 ENCOUNTER — CARE COORDINATION (OUTPATIENT)
Dept: CARDIOLOGY | Facility: CLINIC | Age: 68
End: 2018-01-31

## 2018-01-31 DIAGNOSIS — I50.22 CHRONIC SYSTOLIC HEART FAILURE (H): Primary | ICD-10-CM

## 2018-02-09 ENCOUNTER — TELEPHONE (OUTPATIENT)
Dept: FAMILY MEDICINE | Facility: CLINIC | Age: 68
End: 2018-02-09

## 2018-02-09 DIAGNOSIS — I50.9 CHRONIC CONGESTIVE HEART FAILURE, UNSPECIFIED CONGESTIVE HEART FAILURE TYPE: ICD-10-CM

## 2018-02-09 RX ORDER — FUROSEMIDE 20 MG
60 TABLET ORAL 2 TIMES DAILY
Qty: 60 TABLET | Refills: 0 | Status: SHIPPED | OUTPATIENT
Start: 2018-02-09 | End: 2018-02-14

## 2018-02-09 NOTE — TELEPHONE ENCOUNTER
[facility] phone call message-patient reporting a symptom:     Symptom: Home Care Nurse Reports  Left lower lobe crackles, No shortness of breath,A- febrile,   No other fluid noticed on board today   B/P-124/70 Pulse -72    No return call needed.This was just to inform Provider of pt current status    Same Day Visit Offered: N/A    Additional comments: Per Home Care Nurse Pt understand if any changes noticed and what has to be done.    OK to leave message on voice mail? Yes    Primary language: English      needed? No    Call taken on February 9, 2018 at 3:36 PM by Lindsay Fernandez

## 2018-02-09 NOTE — TELEPHONE ENCOUNTER
[facility] phone call message- medication clarification/question:    Full Medication Name: Lasix   Dose: 20mg tablet    Question: please send current Rx in the chart for Lasix to the below pharmacy.  The current Rx was not sent to the correct pharmacy.  I did let the patient know they can ask for a transfer of medication and they refused.      Pharmacy confirmed as   [id number] - [place], [place] - [address]  [address]  [facility] [address]  Phone: [phone] Fax: [phone]  : Yes    OK to leave a message on voice mail? Yes    Call taken on February 9, 2018 at 9:24 AM by Jhoana Morgan

## 2018-02-14 DIAGNOSIS — I50.9 CHRONIC CONGESTIVE HEART FAILURE, UNSPECIFIED CONGESTIVE HEART FAILURE TYPE: ICD-10-CM

## 2018-02-14 NOTE — TELEPHONE ENCOUNTER
[facility] phone call message- medication clarification/question:    Full Medication Name: Lasix    Dose: 20mg    Question:  Lasix was called to pharmacy but patient only got 60 tablets and he needed to have #180. Please call in the remaining as he wont have enough to take.     Pharmacy confirmed as   [id number] - [place], [place] - [address]  [address]  [facility] [address]  Phone: [phone] Fax: [phone]  : Yes    OK to leave a message on voice mail? Yes    Call taken on February 14, 2018 at 1:43 PM by Yajaira Longoria

## 2018-02-15 RX ORDER — FUROSEMIDE 20 MG
60 TABLET ORAL 2 TIMES DAILY
Qty: 180 TABLET | Refills: 6 | Status: SHIPPED | OUTPATIENT
Start: 2018-02-15 | End: 2018-02-26

## 2018-02-20 ENCOUNTER — CARE COORDINATION (OUTPATIENT)
Dept: CARDIOLOGY | Facility: CLINIC | Age: 68
End: 2018-02-20

## 2018-02-20 DIAGNOSIS — I50.23 ACUTE ON CHRONIC SYSTOLIC HEART FAILURE (H): Primary | ICD-10-CM

## 2018-02-23 ENCOUNTER — TELEPHONE (OUTPATIENT)
Dept: FAMILY MEDICINE | Facility: CLINIC | Age: 68
End: 2018-02-23

## 2018-02-23 NOTE — TELEPHONE ENCOUNTER
[facility] phone call message - order or referral request for patient:     Order or referral being requested: Re-certify for Skilled Nursing Visits 1 time a week for 9 weeks with 3 visits as needed. Okay to continue Occupational Therapy as ordered.    Additional Comments: Verbal is okay.    OK to leave a message on voice mail? Yes    Primary language: English      needed? No    Call taken on February 23, 2018 at 12:56 PM by Landen Coronado

## 2018-02-25 ENCOUNTER — MEDICAL CORRESPONDENCE (OUTPATIENT)
Dept: HEALTH INFORMATION MANAGEMENT | Facility: CLINIC | Age: 68
End: 2018-02-25

## 2018-02-26 ENCOUNTER — OFFICE VISIT (OUTPATIENT)
Dept: CARDIOLOGY | Facility: CLINIC | Age: 68
End: 2018-02-26
Attending: NURSE PRACTITIONER
Payer: COMMERCIAL

## 2018-02-26 ENCOUNTER — HOSPITAL ENCOUNTER (OUTPATIENT)
Facility: CLINIC | Age: 68
End: 2018-02-26
Admitting: INTERNAL MEDICINE

## 2018-02-26 VITALS
OXYGEN SATURATION: 95 % | WEIGHT: 315 LBS | HEART RATE: 101 BPM | SYSTOLIC BLOOD PRESSURE: 100 MMHG | BODY MASS INDEX: 42.66 KG/M2 | DIASTOLIC BLOOD PRESSURE: 72 MMHG | HEIGHT: 72 IN

## 2018-02-26 DIAGNOSIS — I50.9 CHRONIC CONGESTIVE HEART FAILURE, UNSPECIFIED CONGESTIVE HEART FAILURE TYPE: ICD-10-CM

## 2018-02-26 DIAGNOSIS — I50.22 CHRONIC SYSTOLIC HEART FAILURE (H): ICD-10-CM

## 2018-02-26 DIAGNOSIS — E87.6 HYPOKALEMIA: ICD-10-CM

## 2018-02-26 DIAGNOSIS — E78.5 HYPERLIPIDEMIA LDL GOAL <100: ICD-10-CM

## 2018-02-26 DIAGNOSIS — I10 ESSENTIAL HYPERTENSION WITH GOAL BLOOD PRESSURE LESS THAN 140/90: ICD-10-CM

## 2018-02-26 DIAGNOSIS — I48.20 CHRONIC ATRIAL FIBRILLATION (H): ICD-10-CM

## 2018-02-26 DIAGNOSIS — I50.22 CHRONIC SYSTOLIC HEART FAILURE (H): Primary | ICD-10-CM

## 2018-02-26 DIAGNOSIS — I50.23 ACUTE ON CHRONIC SYSTOLIC HEART FAILURE (H): ICD-10-CM

## 2018-02-26 LAB
ANION GAP SERPL CALCULATED.3IONS-SCNC: 8 MMOL/L (ref 3–14)
BUN SERPL-MCNC: 18 MG/DL (ref 7–30)
CALCIUM SERPL-MCNC: 9.1 MG/DL (ref 8.5–10.1)
CHLORIDE SERPL-SCNC: 101 MMOL/L (ref 94–109)
CO2 SERPL-SCNC: 26 MMOL/L (ref 20–32)
CREAT SERPL-MCNC: 1.05 MG/DL (ref 0.66–1.25)
GFR SERPL CREATININE-BSD FRML MDRD: 70 ML/MIN/1.7M2
GLUCOSE SERPL-MCNC: 129 MG/DL (ref 70–99)
NT-PROBNP SERPL-MCNC: 773 PG/ML (ref 0–125)
POTASSIUM SERPL-SCNC: 4.1 MMOL/L (ref 3.4–5.3)
SODIUM SERPL-SCNC: 135 MMOL/L (ref 133–144)

## 2018-02-26 PROCEDURE — 80048 BASIC METABOLIC PNL TOTAL CA: CPT | Performed by: PHYSICIAN ASSISTANT

## 2018-02-26 PROCEDURE — 83880 ASSAY OF NATRIURETIC PEPTIDE: CPT | Performed by: PHYSICIAN ASSISTANT

## 2018-02-26 PROCEDURE — 99215 OFFICE O/P EST HI 40 MIN: CPT | Mod: ZP | Performed by: NURSE PRACTITIONER

## 2018-02-26 PROCEDURE — 36415 COLL VENOUS BLD VENIPUNCTURE: CPT | Performed by: PHYSICIAN ASSISTANT

## 2018-02-26 RX ORDER — FUROSEMIDE 20 MG
TABLET ORAL
Qty: 180 TABLET | Refills: 6 | Status: ON HOLD | OUTPATIENT
Start: 2018-02-26 | End: 2018-06-07

## 2018-02-26 RX ORDER — SODIUM CHLORIDE 9 MG/ML
INJECTION, SOLUTION INTRAVENOUS CONTINUOUS
Status: CANCELLED | OUTPATIENT
Start: 2018-02-26

## 2018-02-26 RX ORDER — LIDOCAINE 40 MG/G
CREAM TOPICAL
Status: CANCELLED | OUTPATIENT
Start: 2018-02-26

## 2018-02-26 RX ORDER — POTASSIUM CHLORIDE 1500 MG/1
TABLET, EXTENDED RELEASE ORAL
Qty: 60 TABLET | Refills: 11 | Status: SHIPPED | OUTPATIENT
Start: 2018-02-26 | End: 2018-06-15

## 2018-02-26 ASSESSMENT — PAIN SCALES - GENERAL: PAINLEVEL: NO PAIN (0)

## 2018-02-26 NOTE — NURSING NOTE
Diet: Patient instructed regarding a heart failure healthy diet, including discussion of reduced fat and 2000 mg daily sodium restriction, daily weights, medication purpose and compliance, fluid restrictions and resources for patient and family to access for assistance with heart failure management.       Labs: Patient was given results of the laboratory testing obtained today and patient was instructed about when to return for the next laboratory testing.    Med Reconcile: Reviewed and verified all current medications with the patient. The updated medication list was printed and given to the patient.    Return Appointment: Patient given instructions regarding scheduling next clinic visit.     Patient stated he understood all health information given and agreed to call with further questions or concerns.      This clinic visit:   Increase lasix to 80/60  Increase potassium to 40/20  In the process of scheduling a RHC and LHC, depending on date will determine when next labs should be drawn  Also, depending on INR will depend on when coumadin should be stopped prior to procedure. We will keep patient informed  Please schedule appointment with Dr. Sanchez to follow INR.   Follow up with Dr. Willoughby after procedures.   Pt had to leave clinic to catch metro mobility, so once procedures are scheduled I will call the patient to discuss instructions.

## 2018-02-26 NOTE — PATIENT INSTRUCTIONS
"You were seen today in the Cardiovascular Clinic at the [facility].     Cardiology Providers you saw during your visit: Alisha KIRK, ULYSSES       1. Increase lasix to 80 mg in am and 60 mg in pm.   2. Increase potassium to 40 MEQ in am and 20 MEQ in pm.   3. You are scheduled for a Right and left heart catheterization on 3/6 at [facility]. Please arrive to gold waiting room at 1030.   4. Follow up with Dr. Sanchez for your INR. Labs can be done at [facility]  5. Please stop your coumadin on 3/3 prior to procedure.    6. Follow up with Dr. Willoughby after the Right and left heart catheterization on 3/22 at 9 am.       Please limit your fluid intake to 2 L (64 ounces) daily.  2 Liters a day = 8.5 cups, or 72 ounces.  Please limit your salt intake to 2 grams a day or less.    If you gain 2# in 24 hours or 5# in one week call Amira Doll RN so we can adjust your medications as needed over the phone.    Please feel free to call me with any questions or concerns.      Amira Doll RN BSN   [facility]  Cardiology Care Coordinator-Heart Failure Clinic    Questions and [phone].   First press #1 for the [facility] and then press #3 for \"Medical Questions\" to reach us Cardiology Nurses.     On Call Cardiologist for after hours or on weekends: [phone]   option #4 and ask to speak to the on-call Cardiologist. Inform them you are a CORE/heart failure patient at the [facility].        If you need a medication refill please contact your pharmacy.  Please allow 3 business days for your refill to be completed.  _______________________________________________________  [facility] Cardiomyopathy, Optimization, Rehabilitation, Education   The [facility]IN[facility] is a heart failure specialty clinic within the [facility] where you will work with specialized nurse practitioners dedicated to helping patients with heart " failure carefully adjust medications, receive education, and learn who and when to call if symptoms develop. They specialize in helping you better understand your condition, slow the progression of your disease, improve the length and quality of your life, help you detect future heart problems before they become life threatening, and avoid hospitalizations.  As always, thank you for trusting us with your health care needs!

## 2018-02-26 NOTE — LETTER
"2/26/2018      RE: Clarke Moreira  [address]  [facility] [address]       Dear Colleague,    Thank you for the opportunity to participate in the care of your patient, Clarke Moreira, at the [facility] HEART [facility]. Please see a copy of my visit note below.    HPI:   Mr. Moreira is a 67 year old male with a past medical history including prior history of systolic heart failure, most recent LVEF normal, also hx of morbid obesity, atrial fibrillation s/p ablation 2008, THONG/alveolar hypoventilation, HTN, dyslipidemia, ?CAD - unknown details and patient denies any hx of coronary angiography, PAD, hypothyroidism, type2 DM, depression, anxiety, BPH. Presents to clinic for CORE follow-up.    He was last admitted 1/5/18-1/1-/18 for cellulitis and also decompensated heart failure. He was diuresed approx 20 lb at this time, DC weight 374 lb. Since hospital dc, continues to feel SAWANT and fatigued but edema has significantly improved. He cannot recall if dyspnea was better or different at all in the summer months. He does not monitor weight at home but in clinic he is up 5 lb. He denies true orthopnea but wears O2 at night to help breathing. He sleeps in a hospital bed at an angle. Using lymphedema wraps. Patient endorses some lightheadedness with exertion and mild orthostasis, both of which are controlled by \"taking things slow\". He denies presyncope, syncope, palpitations. Denies chest pain with current level of exertion. BP at home 110s-130s/60s-80s. He reports nosebleed on warfarin that resolved with using a humidifier otherwise no bleeding issues.       PAST MEDICAL HISTORY:  Past Medical History:   Diagnosis Date     Atrial fibrillation (H)      Basal cell carcinoma      Chronic anticoagulation      Diastolic heart failure (H)      Dyslipidemia      Essential hypertension      Morbid obesity (H)      Sleep-disordered breathing      Type 2 diabetes " mellitus (H)        FAMILY HISTORY:  Family History   Problem Relation Age of Onset     Depression Mother      CANCER No family hx of      No family history of skin cancer       SOCIAL HISTORY:  Social History     Social History     Marital status: Single     Spouse name: N/A     Number of children: N/A     Years of education: N/A     Occupational History     Not on file.     Social History Main Topics     Smoking status: Never Smoker     Smokeless tobacco: Never Used     Alcohol use No      Comment: Former alcohol abuse. Quit 70s then quit later in 80s-present     Drug use: No      Comment: history of LSD use     Sexual activity: Not on file     Other Topics Concern     Not on file     Social History Narrative    Lives in an apartment in 16th floor near hospital.  Has elevators, unable to use stairs. Not able to shop; has things delivered to him including food. Difficulty completing cleaning. Goes to PCP once yearly for annual check up and medication review.       CURRENT MEDICATIONS:    Current Outpatient Prescriptions on File Prior to Visit:  furosemide (LASIX) 20 MG tablet Take 3 tablets (60 mg) by mouth 2 times daily   atorvastatin (LIPITOR) 40 MG tablet Take 1 tablet (40 mg) by mouth daily   lisinopril (PRINIVIL/ZESTRIL) 5 MG tablet Take 1 tablet (5 mg) by mouth 2 times daily   order for DME Equipment being ordered: Bariatric Lift chairDiagnosis - morbid obesity, CHF, LymphedemaFax to Ne from [employer] at [phone].   levothyroxine (SYNTHROID/LEVOTHROID) 175 MCG tablet Take 1 tablet (175 mcg) by mouth daily   spironolactone (ALDACTONE) 25 MG tablet Take 1 tablet (25 mg) by mouth 2 times daily   warfarin (COUMADIN) 5 MG tablet Take 2.5 tablets (12.5 mg) by mouth daily   potassium chloride SA (K-DUR/KLOR-CON M) 20 MEQ CR tablet Take 2 tablets (40 mEq) by mouth daily (Patient taking differently: Take 20 mEq by mouth 2 times daily )   senna-docusate (SENOKOT-S;PERICOLACE) 8.6-50 MG per tablet Take 1-2  tablets by mouth 2 times daily as needed for constipation   metFORMIN (GLUCOPHAGE) 500 MG tablet Take 1 tablet (500 mg) by mouth 2 times daily (with meals)   metoprolol (TOPROL-XL) 100 MG 24 hr tablet Take 1 tablet (100 mg) by mouth daily   multivitamin, therapeutic with minerals (MULTI-VITAMIN) TABS tablet Take 1 tablet by mouth daily   omega 3 1000 MG CAPS Take 1 g by mouth daily   polyethylene glycol (MIRALAX/GLYCOLAX) Packet Take 17 g by mouth daily as needed for constipation   tamsulosin (FLOMAX) 0.4 MG capsule Take 1 capsule (0.4 mg) by mouth daily   order for DME Equipment being ordered: Other: Velcro Compression stockings. Treatment Diagnosis: bilateral lymphedema.   aspirin 81 MG tablet Take 1 tablet (81 mg) by mouth daily     No current facility-administered medications on file prior to visit.     ROS:   CONSTITUTIONAL: Denies fever, chills, fatigue, or weight fluctuations.    HEENT: Denies headache, vision changes, and changes in speech.   CV: Refer to HPI.   PULMONARY:Refer to HPI.   GI:Denies nausea, vomiting, diarrhea, and abdominal pain. Bowel movements are regular.   :Denies urinary alterations, dysuria, urinary frequency, hematuria, and abnormal drainage.   EXT:+chronic lower extremity edema.   SKIN:Denies abnormal rashes or lesions.   MUSCULOSKELETAL:Denies upper or lower extremity weakness and pain.   NEUROLOGIC:Denies lightheadedness, dizziness, seizures, or upper or lower extremity paresthesia.     EXAM:  /72 (BP Location: Left arm, Patient Position: Chair, Cuff Size: Adult Large)  Pulse 101  Ht 1.829 m (6')  Wt (!) 172.4 kg (380 lb)  SpO2 95%  BMI 51.54 kg/m2     GENERAL: Appears comfortable, in no acute distress.   HEENT: Eye symmetrical, no discharge or icterus bilaterally. Mucous membranes moist and without lesions.  CV: RRR, distant S1S2, no appreciable murmur, rub, or gallop. JVP not appreciable at 90 degrees (examined in chair for safety reasons).   RESPIRATORY: Respirations  regular, even, and unlabored. Lungs CTA throughout.   GI: Soft, obese, and non distended with normoactive bowel sounds present in all quadrants. No tenderness, rebound, guarding. No hepatomegaly.   EXTREMITIES: 1+ bilat peripheral edema. 2+ bilateral pedal pulses.   NEUROLOGIC: Alert and oriented x 3. No focal deficits.   MUSCULOSKELETAL: No joint swelling or tenderness.   SKIN: No jaundice. No rashes. Healing biopsy incision on forehead.      Labs, reviewed with patient in clinic today:  CBC RESULTS:  Lab Results   Component Value Date    WBC 7.9 01/07/2018    RBC 4.71 01/07/2018    HGB 15.3 01/15/2018    HCT 49.8 01/15/2018    MCV 97.8 01/15/2018    MCH 30.1 01/15/2018    MCHC 30.7 (L) 01/15/2018    RDW 15.7 (H) 01/07/2018     01/07/2018       CMP RESULTS:  Lab Results   Component Value Date     02/26/2018    POTASSIUM 4.1 02/26/2018    CHLORIDE 101 02/26/2018    CO2 26 02/26/2018    ANIONGAP 8 02/26/2018     (H) 02/26/2018    BUN 18 02/26/2018    CR 1.05 02/26/2018    GFRESTIMATED 70 02/26/2018    GFRESTBLACK 85 02/26/2018    CHERI 9.1 02/26/2018    BILITOTAL 1.1 01/05/2018    ALBUMIN 3.1 (L) 01/05/2018    ALKPHOS 87 01/05/2018    ALT 16 01/05/2018    AST 16 01/05/2018        INR RESULTS:  Lab Results   Component Value Date    INR 3.0 01/15/2018       Lab Results   Component Value Date    MAG 2.4 (H) 01/15/2018     Lab Results   Component Value Date    NTBNPI 1745 (H) 09/23/2017     Lab Results   Component Value Date    NTBNP 773 (H) 02/26/2018       Diagnostics:  ECG 1/6/18  Atrial fibrillation with PVCs, multifocal    TTE 9/24/17  LVEF is probably normal on limited contrasct-enhanced images.  On limited views, RV function is probably mildly reduced.  Pulmonary artery systolic pressure cannot be assessed.  The inferior vena cava is dilated at 3.34 cm without respiratory variability,  consistent with increased right atrial pressure. Estimated right atrial  pressure is > 15 mmHg.  No pericardial  effusion is present.    Assessment and Plan:   Mr. Moreira is a 67 year old male with prior history of systolic heart failure, most recent LVEF normal, also hx of morbid obesity, atrial fibrillation, THONG/alveolar hypoventilation, HTN who presents to CORE clinic for follow-up. Today, he presents slightly volume overloaded (difficult exam but +sx and 5 lb up from hospital DC) so we will increase his lasix. We will also schedule patient for a RHC and coronary angiogram for further evaluation of his degrees of heart failure (echos with poor windows) and eval for ischemic component (no hx of prior angiogram). These studies have been planned for some time, he is close to optimized clinically today. Renal function is normal. He will need to hold warfarin prior to LHC.     # Chronic borderline diastolic heart failure/HFpEF with improved EF  Multifactorial, some component of ICM but also likely diastolic dysfunction 2/2 obesity, THONG, etc   Stage C. NYHA Class III.    Fluid status: hypervolemic, increase lasix to 80/60 mg  ACEi/ARB/ARNI: , lisinopril 5 mg bid for HTN  BB: Toprol 100 mg daily for HTN and rate control  Aldosterone antagonist: spironolactone 25 mg bid (despite normal EF now, some benefit shown in TOPCAT trial)  SCD prophylaxis: n/a EF>40%  Ischemic evaluation: plan for coronary angiogram to evaluate for CAD, has significant risk factors for this (DM, obesity, htn) and also has PAD    # HTN  -continue lisinpril and toporl    # Atrial fibrillation  -continue warfarin  -continue toprol     # THONG/alveolar hypoventilation  -encourage f/u with sleep medicine (overdue) as likely contributing to fluid retention       Follow up with Dr. Willoughby after RHC and coronary angiogram evaluation.       40 minutes spent face-to-face with patient, >50% in counseling and/or coordination of care as described above    Alisha Norman DNP, NP-C  2/26/2018          GEOFFREY BE

## 2018-02-26 NOTE — MR AVS SNAPSHOT
"              After Visit Summary   2018    Clarke Moreira    MRN: [id number]           Patient Information     Date Of Birth          1950        Visit Information        Provider Department      2018 11:00 AM Sybil Norman, APRN CNP [facility]        Today's Diagnoses     Chronic systolic heart failure (H)    -  1    Chronic congestive heart failure, unspecified congestive heart failure type (H)        Hypokalemia        Essential hypertension with goal blood pressure less than 140/90        Chronic atrial fibrillation (H)        Hyperlipidemia LDL goal <100          Care Instructions    You were seen today in the Cardiovascular Clinic at the [facility].     Cardiology Providers you saw during your visit: Alisha KIRK CNP       1. Increase lasix to 80 mg in am and 60 mg in pm.   2. Increase potassium to 40 MEQ in am and 20 MEQ in pm.   3. You are scheduled for a Right and left heart catheterization on 3/6 at [facility]. Please arrive to gold waiting room at 1030.   4. Follow up with Dr. Sanchez for your INR. Labs can be done at [facility]  5. Please stop your coumadin on 3/3 prior to procedure.    6. Follow up with Dr. Willoughby after the Right and left heart catheterization on 3/22 at 9 am.       Please limit your fluid intake to 2 L (64 ounces) daily.  2 Liters a day = 8.5 cups, or 72 ounces.  Please limit your salt intake to 2 grams a day or less.    If you gain 2# in 24 hours or 5# in one week call Amira Doll RN so we can adjust your medications as needed over the phone.    Please feel free to call me with any questions or concerns.      Amira Doll RN BSN   [facility]  Cardiology Care Coordinator-Heart Failure Clinic    Questions and [phone].   First press #1 for the [facility] and then press #3 for \"Medical Questions\" to reach us Cardiology Nurses.     On Call Cardiologist for after hours or on " weekends: [phone]   option #4 and ask to speak to the on-call Cardiologist. Inform them you are a CORE/heart failure patient at the [facility].        If you need a medication refill please contact your pharmacy.  Please allow 3 business days for your refill to be completed.  _______________________________________________________  C.O.R.E. CLINIC Cardiomyopathy, Optimization, Rehabilitation, Education   The C.O.R.E. CLINIC is a heart failure specialty clinic within the [facility] where you will work with specialized nurse practitioners dedicated to helping patients with heart failure carefully adjust medications, receive education, and learn who and when to call if symptoms develop. They specialize in helping you better understand your condition, slow the progression of your disease, improve the length and quality of your life, help you detect future heart problems before they become life threatening, and avoid hospitalizations.  As always, thank you for trusting us with your health care needs!                Follow-ups after your visit        Additional Services     Follow-Up with Advanced Heart Failure Cardiologist       First available please                  Your next 10 appointments already scheduled     Mar 06, 2018 10:30 AM CST   LAB with UU LAB GOLD WAITING   [facility] ([facility])    [address] [facility] [address]              Please do not eat 10-12 hours before your appointment if you are coming in fasting for labs on lipids, cholesterol, or glucose (sugar). This does not apply to pregnant women. Water, hot tea and black coffee (with nothing added) are okay. Do not drink other fluids, diet soda or chew gum.            Mar 06, 2018 11:00 AM CST   Procedure - 2.5 hour with U2A ROOM 1   [address] [facility] ([facility])    [address] [place] [address]  [address]               Mar 06, 2018 12:00 PM CST   Heart Cath Right Heart Cath with [id number]   [facility], [place]  Heart Cath Lab ([facility])    [address]  [facility] [address] [phone]            Mar 22, 2018  9:00 AM CDT   (Arrive by 8:45 AM)   RETURN HEART FAILURE with Christian Willoughby MD   [facility] ([facility])    [address] [place] [address]  [address]  [facility] [address] [phone]            May 21, 2018 10:20 AM CDT   (Arrive by 10:05 AM)   RETURN FOOT/ANKLE with Jesús Lagos DPM   [facility] ([facility])    [address] [place] [address]  4th Floor  [facility] [address] [phone]              Future tests that were ordered for you today     Open Future Orders        Priority Expected Expires Ordered    Outpatient Coronary Angiography Adult Order Routine  5/17/2018 2/26/2018    Follow-Up with Advanced Heart Failure Cardiologist Routine 3/5/2018 6/3/2018 2/26/2018    Heart Cath Right heart cath Routine  2/26/2019 2/26/2018            Who to contact     If you have questions or need follow up information about today's clinic visit or your schedule please contact [facility] directly at [phone].  Normal or non-critical lab and imaging results will be communicated to you by [employer]hart, letter or phone within 4 business days after the clinic has received the results. If you do not hear from us within 7 days, please contact the clinic through [employer] or phone. If you have a critical or abnormal lab result, we will notify you by phone as soon as possible.  Submit refill requests through [employer] or call your pharmacy and they will forward the refill request to us. Please allow 3 business days for your refill to be completed.          Additional Information About Your Visit        Duplia Information     [employer]ia lets you send messages to your doctor, view  "your test results, renew your prescriptions, schedule appointments and more. To sign up, go to [email or web address] . Click on \"Log in\" on the left side of the screen, which will take you to the Welcome page. Then click on \"Sign up Now\" on the right side of the page.     You will be asked to enter the access code listed below, as well as some personal information. Please follow the directions to create your username and password.     Your access code is: [email or web address]  Expires: 2018  1:11 PM     Your access code will  in 90 days. If you need help or a new code, please call your [facility] clinic or [phone].        Care EveryWhere ID     This is your Care EveryWhere ID. This could be used by other organizations to access your [facility] medical records  [id number]        Your Vitals Were     Pulse Height Pulse Oximetry BMI (Body Mass Index)          101 1.829 m (6') 95% 51.54 kg/m2         Blood Pressure from Last 3 Encounters:   18 100/72   01/15/18 109/70   01/10/18 100/63    Weight from Last 3 Encounters:   18 (!) 172.4 kg (380 lb)   01/15/18 (!) 170.1 kg (375 lb)   01/10/18 (!) 168.3 kg (371 lb 1.6 oz)                 Today's Medication Changes          These changes are accurate as of 18  1:13 PM.  If you have any questions, ask your nurse or doctor.               These medicines have changed or have updated prescriptions.        Dose/Directions    furosemide 20 MG tablet   Commonly known as:  LASIX   This may have changed:    - how much to take  - how to take this  - when to take this  - additional instructions   Used for:  Chronic congestive heart failure, unspecified congestive heart failure type (H)   Changed by:  Sybil Norman APRN CNP        Take 80 mg in the morning (four pills) and 60 mg (three pills) in the afternoons.   Quantity:  180 tablet   Refills:  6       potassium chloride SA 20 MEQ CR tablet   Commonly known as:  K-DUR/KLOR-CON M   Indication:  hypokalemia "   This may have changed:    - how much to take  - how to take this  - when to take this  - additional instructions   Used for:  Hypokalemia   Changed by:  Sybil Norman, REAGAN CNP        Take 40 meq in the morning and 20 meq in the afternoons.   Quantity:  60 tablet   Refills:  11            Where to get your medicines      These medications were sent to [id number] - [place], [place] - [address]  [address] [facility] [address]     Phone:  [phone]     furosemide 20 MG tablet    potassium chloride SA 20 MEQ CR tablet                Primary Care Provider Office Phone # Fax #    Matthias Sanchez [phone]       [address]  [facility] [address]        Equal Access to Services     TYLER [place] : Felisa tomo Sodillan, waaxda luqadaha, qaybta kaalmada adenegrayanacho, vasyl casarez . So [facility] [phone].    ATENCIÓN: Si habla español, tiene a kim disposición servicios gratuitos de asistencia lingüística. [facility] [phone].    We comply with applicable federal civil rights laws and [place] laws. We do not discriminate on the basis of race, color, national origin, age, disability, sex, sexual orientation, or gender identity.            Thank you!     Thank you for choosing [facility]  for your care. Our goal is always to provide you with excellent care. Hearing back from our patients is one way we can continue to improve our services. Please take a few minutes to complete the written survey that you may receive in the mail after your visit with us. Thank you!             Your Updated Medication List - Protect others around you: Learn how to safely use, store and throw away your medicines at www.disposemymeds.org.          This list is accurate as of 2/26/18  1:13 PM.  Always use your most recent med list.                   Brand Name Dispense Instructions for use Diagnosis    aspirin 81 MG tablet     30 tablet    Take  1 tablet (81 mg) by mouth daily    Essential hypertension       atorvastatin 40 MG tablet    LIPITOR    30 tablet    Take 1 tablet (40 mg) by mouth daily    Type 2 diabetes mellitus without complication, without long-term current use of insulin (H)       furosemide 20 MG tablet    LASIX    180 tablet    Take 80 mg in the morning (four pills) and 60 mg (three pills) in the afternoons.    Chronic congestive heart failure, unspecified congestive heart failure type (H)       levothyroxine 175 MCG tablet    SYNTHROID/LEVOTHROID    30 tablet    Take 1 tablet (175 mcg) by mouth daily    Hypothyroidism, unspecified type       lisinopril 5 MG tablet    PRINIVIL/ZESTRIL    60 tablet    Take 1 tablet (5 mg) by mouth 2 times daily    Chronic systolic congestive heart failure (H)       metFORMIN 500 MG tablet    GLUCOPHAGE    60 tablet    Take 1 tablet (500 mg) by mouth 2 times daily (with meals)    Type 2 diabetes mellitus without complication, without long-term current use of insulin (H)       metoprolol succinate 100 MG 24 hr tablet    TOPROL-XL    30 tablet    Take 1 tablet (100 mg) by mouth daily    Essential hypertension with goal blood pressure less than 140/90       multivitamin, therapeutic with minerals Tabs tablet     30 each    Take 1 tablet by mouth daily    Type 2 diabetes mellitus without complication, without long-term current use of insulin (H)       omega 3 1000 MG Caps     30 capsule    Take 1 g by mouth daily    Hyperlipidemia LDL goal <100       * order for DME     2 each    Equipment being ordered: Other: Velcro Compression stockings.  Treatment Diagnosis: bilateral lymphedema.    Lymphedema of extremity       * order for DME     1 Units    Equipment being ordered: Bariatric Lift chair Diagnosis - morbid obesity, CHF, Lymphedema  Fax to Ne from [employer] at [phone].    Chronic systolic congestive heart failure (H), Lymphedema, Morbid obesity (H)       polyethylene glycol Packet    MIRALAX/GLYCOLAX     15 packet    Take 17 g by mouth daily as needed for constipation    Constipation, unspecified constipation type       potassium chloride SA 20 MEQ CR tablet    K-DUR/KLOR-CON M    60 tablet    Take 40 meq in the morning and 20 meq in the afternoons.    Hypokalemia       senna-docusate 8.6-50 MG per tablet    SENOKOT-S;PERICOLACE    60 tablet    Take 1-2 tablets by mouth 2 times daily as needed for constipation    Constipation, unspecified constipation type       spironolactone 25 MG tablet    ALDACTONE    60 tablet    Take 1 tablet (25 mg) by mouth 2 times daily    Chronic systolic congestive heart failure (H)       tamsulosin 0.4 MG capsule    FLOMAX    30 capsule    Take 1 capsule (0.4 mg) by mouth daily    Urinary retention       warfarin 5 MG tablet    COUMADIN    75 tablet    Take 2.5 tablets (12.5 mg) by mouth daily    Persistent atrial fibrillation (H)       * Notice:  This list has 2 medication(s) that are the same as other medications prescribed for you. Read the directions carefully, and ask your doctor or other care provider to review them with you.

## 2018-02-26 NOTE — NURSING NOTE
Chief Complaint   Patient presents with     New Patient     67 year old new CORE pt, acute on chronic HFrEF: Fartun patient. labs prior     Vitals were taken and medications were reconciled.  Devorah Arredondo    10:59 AM

## 2018-02-26 NOTE — PROGRESS NOTES
"HPI:   Mr. Moreira is a 67 year old male with a past medical history including prior history of systolic heart failure, most recent LVEF normal, also hx of morbid obesity, atrial fibrillation s/p ablation 2008, THONG/alveolar hypoventilation, HTN, dyslipidemia, ?CAD - unknown details and patient denies any hx of coronary angiography, PAD, hypothyroidism, type2 DM, depression, anxiety, BPH. Presents to clinic for CORE follow-up.    He was last admitted 1/5/18-1/1-/18 for cellulitis and also decompensated heart failure. He was diuresed approx 20 lb at this time, DC weight 374 lb. Since hospital dc, continues to feel SAWANT and fatigued but edema has significantly improved. He cannot recall if dyspnea was better or different at all in the summer months. He does not monitor weight at home but in clinic he is up 5 lb. He denies true orthopnea but wears O2 at night to help breathing. He sleeps in a hospital bed at an angle. Using lymphedema wraps. Patient endorses some lightheadedness with exertion and mild orthostasis, both of which are controlled by \"taking things slow\". He denies presyncope, syncope, palpitations. Denies chest pain with current level of exertion. BP at home 110s-130s/60s-80s. He reports nosebleed on warfarin that resolved with using a humidifier otherwise no bleeding issues.       PAST MEDICAL HISTORY:  Past Medical History:   Diagnosis Date     Atrial fibrillation (H)      Basal cell carcinoma      Chronic anticoagulation      Diastolic heart failure (H)      Dyslipidemia      Essential hypertension      Morbid obesity (H)      Sleep-disordered breathing      Type 2 diabetes mellitus (H)        FAMILY HISTORY:  Family History   Problem Relation Age of Onset     Depression Mother      CANCER No family hx of      No family history of skin cancer       SOCIAL HISTORY:  Social History     Social History     Marital status: Single     Spouse name: N/A     Number of children: N/A     Years of education: N/A "     Occupational History     Not on file.     Social History Main Topics     Smoking status: Never Smoker     Smokeless tobacco: Never Used     Alcohol use No      Comment: Former alcohol abuse. Quit 70s then quit later in 80s-present     Drug use: No      Comment: history of LSD use     Sexual activity: Not on file     Other Topics Concern     Not on file     Social History Narrative    Lives in an apartment in 16th floor near hospital.  Has elevators, unable to use stairs. Not able to shop; has things delivered to him including food. Difficulty completing cleaning. Goes to PCP once yearly for annual check up and medication review.       CURRENT MEDICATIONS:    Current Outpatient Prescriptions on File Prior to Visit:  furosemide (LASIX) 20 MG tablet Take 3 tablets (60 mg) by mouth 2 times daily   atorvastatin (LIPITOR) 40 MG tablet Take 1 tablet (40 mg) by mouth daily   lisinopril (PRINIVIL/ZESTRIL) 5 MG tablet Take 1 tablet (5 mg) by mouth 2 times daily   order for DME Equipment being ordered: Bariatric Lift chairDiagnosis - morbid obesity, CHF, LymphedemaF[phone].   levothyroxine (SYNTHROID/LEVOTHROID) 175 MCG tablet Take 1 tablet (175 mcg) by mouth daily   spironolactone (ALDACTONE) 25 MG tablet Take 1 tablet (25 mg) by mouth 2 times daily   warfarin (COUMADIN) 5 MG tablet Take 2.5 tablets (12.5 mg) by mouth daily   potassium chloride SA (K-DUR/KLOR-CON M) 20 MEQ CR tablet Take 2 tablets (40 mEq) by mouth daily (Patient taking differently: Take 20 mEq by mouth 2 times daily )   senna-docusate (SENOKOT-S;PERICOLACE) 8.6-50 MG per tablet Take 1-2 tablets by mouth 2 times daily as needed for constipation   metFORMIN (GLUCOPHAGE) 500 MG tablet Take 1 tablet (500 mg) by mouth 2 times daily (with meals)   metoprolol (TOPROL-XL) 100 MG 24 hr tablet Take 1 tablet (100 mg) by mouth daily   multivitamin, therapeutic with minerals (MULTI-VITAMIN) TABS tablet Take 1 tablet by mouth daily    omega 3 1000 MG CAPS Take 1 g by mouth daily   polyethylene glycol (MIRALAX/GLYCOLAX) Packet Take 17 g by mouth daily as needed for constipation   tamsulosin (FLOMAX) 0.4 MG capsule Take 1 capsule (0.4 mg) by mouth daily   order for DME Equipment being ordered: Other: Velcro Compression stockings. Treatment Diagnosis: bilateral lymphedema.   aspirin 81 MG tablet Take 1 tablet (81 mg) by mouth daily     No current facility-administered medications on file prior to visit.     ROS:   CONSTITUTIONAL: Denies fever, chills, fatigue, or weight fluctuations.    HEENT: Denies headache, vision changes, and changes in speech.   CV: Refer to HPI.   PULMONARY:Refer to HPI.   GI:Denies nausea, vomiting, diarrhea, and abdominal pain. Bowel movements are regular.   :Denies urinary alterations, dysuria, urinary frequency, hematuria, and abnormal drainage.   EXT:+chronic lower extremity edema.   SKIN:Denies abnormal rashes or lesions.   MUSCULOSKELETAL:Denies upper or lower extremity weakness and pain.   NEUROLOGIC:Denies lightheadedness, dizziness, seizures, or upper or lower extremity paresthesia.     EXAM:  /72 (BP Location: Left arm, Patient Position: Chair, Cuff Size: Adult Large)  Pulse 101  Ht 1.829 m (6')  Wt (!) 172.4 kg (380 lb)  SpO2 95%  BMI 51.54 kg/m2     GENERAL: Appears comfortable, in no acute distress.   HEENT: Eye symmetrical, no discharge or icterus bilaterally. Mucous membranes moist and without lesions.  CV: RRR, distant S1S2, no appreciable murmur, rub, or gallop. JVP not appreciable at 90 degrees (examined in chair for safety reasons).   RESPIRATORY: Respirations regular, even, and unlabored. Lungs CTA throughout.   GI: Soft, obese, and non distended with normoactive bowel sounds present in all quadrants. No tenderness, rebound, guarding. No hepatomegaly.   EXTREMITIES: 1+ bilat peripheral edema. 2+ bilateral pedal pulses.   NEUROLOGIC: Alert and oriented x 3. No focal deficits.    MUSCULOSKELETAL: No joint swelling or tenderness.   SKIN: No jaundice. No rashes. Healing biopsy incision on forehead.      Labs, reviewed with patient in clinic today:  CBC RESULTS:  Lab Results   Component Value Date    WBC 7.9 01/07/2018    RBC 4.71 01/07/2018    HGB 15.3 01/15/2018    HCT 49.8 01/15/2018    MCV 97.8 01/15/2018    MCH 30.1 01/15/2018    MCHC 30.7 (L) 01/15/2018    RDW 15.7 (H) 01/07/2018     01/07/2018       CMP RESULTS:  Lab Results   Component Value Date     02/26/2018    POTASSIUM 4.1 02/26/2018    CHLORIDE 101 02/26/2018    CO2 26 02/26/2018    ANIONGAP 8 02/26/2018     (H) 02/26/2018    BUN 18 02/26/2018    CR 1.05 02/26/2018    GFRESTIMATED 70 02/26/2018    GFRESTBLACK 85 02/26/2018    CHERI 9.1 02/26/2018    BILITOTAL 1.1 01/05/2018    ALBUMIN 3.1 (L) 01/05/2018    ALKPHOS 87 01/05/2018    ALT 16 01/05/2018    AST 16 01/05/2018        INR RESULTS:  Lab Results   Component Value Date    INR 3.0 01/15/2018       Lab Results   Component Value Date    MAG 2.4 (H) 01/15/2018     Lab Results   Component Value Date    NTBNPI 1745 (H) 09/23/2017     Lab Results   Component Value Date    NTBNP 773 (H) 02/26/2018       Diagnostics:  ECG 1/6/18  Atrial fibrillation with PVCs, multifocal    TTE 9/24/17  LVEF is probably normal on limited contrasct-enhanced images.  On limited views, RV function is probably mildly reduced.  Pulmonary artery systolic pressure cannot be assessed.  The inferior vena cava is dilated at 3.34 cm without respiratory variability,  consistent with increased right atrial pressure. Estimated right atrial  pressure is > 15 mmHg.  No pericardial effusion is present.    Assessment and Plan:   Mr. Moreira is a 67 year old male with prior history of systolic heart failure, most recent LVEF normal, also hx of morbid obesity, atrial fibrillation, THONG/alveolar hypoventilation, HTN who presents to CORE clinic for follow-up. Today, he presents slightly volume  overloaded (difficult exam but +sx and 5 lb up from hospital DC) so we will increase his lasix. We will also schedule patient for a RHC and coronary angiogram for further evaluation of his degrees of heart failure (echos with poor windows) and eval for ischemic component (no hx of prior angiogram). These studies have been planned for some time, he is close to optimized clinically today. Renal function is normal. He will need to hold warfarin prior to LHC.     # Chronic borderline diastolic heart failure/HFpEF with improved EF  Multifactorial, some component of ICM but also likely diastolic dysfunction 2/2 obesity, THONG, etc   Stage C. NYHA Class III.    Fluid status: hypervolemic, increase lasix to 80/60 mg  ACEi/ARB/ARNI: , lisinopril 5 mg bid for HTN  BB: Toprol 100 mg daily for HTN and rate control  Aldosterone antagonist: spironolactone 25 mg bid (despite normal EF now, some benefit shown in TOPCAT trial)  SCD prophylaxis: n/a EF>40%  Ischemic evaluation: plan for coronary angiogram to evaluate for CAD, has significant risk factors for this (DM, obesity, htn) and also has PAD    # HTN  -continue lisinpril and toporl    # Atrial fibrillation  -continue warfarin  -continue toprol     # THONG/alveolar hypoventilation  -encourage f/u with sleep medicine (overdue) as likely contributing to fluid retention       Follow up with Dr. Willoughby after RHC and coronary angiogram evaluation.       40 minutes spent face-to-face with patient, >50% in counseling and/or coordination of care as described above    Alisha Norman DNP, NP-C  2/26/2018          GEOFFREY BE

## 2018-03-02 ENCOUNTER — DOCUMENTATION ONLY (OUTPATIENT)
Dept: CARE COORDINATION | Facility: CLINIC | Age: 68
End: 2018-03-02

## 2018-03-02 ENCOUNTER — TELEPHONE (OUTPATIENT)
Dept: FAMILY MEDICINE | Facility: CLINIC | Age: 68
End: 2018-03-02

## 2018-03-02 DIAGNOSIS — E11.65 TYPE 2 DIABETES MELLITUS WITH HYPERGLYCEMIA, WITHOUT LONG-TERM CURRENT USE OF INSULIN (H): Primary | ICD-10-CM

## 2018-03-02 RX ORDER — BLOOD-GLUCOSE METER
KIT MISCELLANEOUS
Qty: 1 KIT | Refills: 0 | Status: SHIPPED | OUTPATIENT
Start: 2018-03-02 | End: 2023-09-13

## 2018-03-02 NOTE — TELEPHONE ENCOUNTER
Left orders to collect BMP, BNP, INR on 3/5 and fax results to me.    Also ordered blood glucose monitoring kit.

## 2018-03-02 NOTE — TELEPHONE ENCOUNTER
[facility] phone call message- patient requesting to speak with PCP or provider:    PCP: Matthias Sanchez    Additional Comments: Patient was hospitalized on 1/5/18 and the home care nurse states that they are able to do all and any future labs at home for the patient.  If we just contact her to let her know what needs to be done then the patient does not have to keep coming in for office visits.    Is a call back needed? Yes    Patient informed that it may take up to 2 business days to hear back from PCP:Yes    OK to leave a message on voice mail? Yes    Primary language: English      needed? No    Call taken on March 2, 2018 at 12:57 PM by Kemi Benitez

## 2018-03-05 ENCOUNTER — TELEPHONE (OUTPATIENT)
Dept: FAMILY MEDICINE | Facility: CLINIC | Age: 68
End: 2018-03-05

## 2018-03-05 LAB
ANION GAP SERPL CALCULATED.3IONS-SCNC: 8 MMOL/L (ref 3–14)
BUN SERPL-MCNC: 23 MG/DL (ref 7–30)
CALCIUM SERPL-MCNC: 8.8 MG/DL (ref 8.5–10.1)
CHLORIDE SERPL-SCNC: 104 MMOL/L (ref 94–109)
CO2 SERPL-SCNC: 26 MMOL/L (ref 20–32)
CREAT SERPL-MCNC: 0.95 MG/DL (ref 0.66–1.25)
GFR SERPL CREATININE-BSD FRML MDRD: 79 ML/MIN/1.7M2
GLUCOSE SERPL-MCNC: 104 MG/DL (ref 70–99)
INR PPP: 2.53 (ref 0.86–1.14)
NT-PROBNP SERPL-MCNC: 780 PG/ML (ref 0–125)
POTASSIUM SERPL-SCNC: 4.1 MMOL/L (ref 3.4–5.3)
SODIUM SERPL-SCNC: 138 MMOL/L (ref 133–144)

## 2018-03-05 PROCEDURE — 80048 BASIC METABOLIC PNL TOTAL CA: CPT | Performed by: FAMILY MEDICINE

## 2018-03-05 PROCEDURE — 83880 ASSAY OF NATRIURETIC PEPTIDE: CPT | Performed by: FAMILY MEDICINE

## 2018-03-05 PROCEDURE — 85610 PROTHROMBIN TIME: CPT | Performed by: FAMILY MEDICINE

## 2018-03-05 NOTE — TELEPHONE ENCOUNTER
[facility] phone call message- general phone call:    Reason for call: The home care nurse Stanley called to report labs have been drawn and resulted in the chart as of today 03/5/18.  She only needs a call back if there are questions or Dr Sanchez needs anything else done.  Ok to leave a voicemail    Return call needed: Yes    OK to leave a message on voice mail? Yes    Primary language: English      needed? No    Call taken on March 5, 2018 at 2:43 PM by Jhoana Morgan

## 2018-03-05 NOTE — TELEPHONE ENCOUNTER
No changes need to be made.  Please inform home nurse    No changes to be made; therefore, no further action needed by triage.    Carla Gonzales RN

## 2018-03-09 ENCOUNTER — DOCUMENTATION ONLY (OUTPATIENT)
Dept: FAMILY MEDICINE | Facility: CLINIC | Age: 68
End: 2018-03-09

## 2018-03-09 NOTE — PROGRESS NOTES
"When opening a documentation only encounter, be sure to enter in \"Chief Complaint\" Forms and in \" Comments\" Title of form, description if needed.    Clarke is a 67 year old  male  Form received via: Fax  Form now resides in: Provider Ready    Israel Willams CMA                Form has been completed by provider.     Form sent out via: Fax to [phone]  Patient informed: No, Reason:confirmed by fax confirmation  Output date: March 9, 2018    Israel Willams CMA      **Please close the encounter**      "

## 2018-03-23 ENCOUNTER — TELEPHONE (OUTPATIENT)
Dept: FAMILY MEDICINE | Facility: CLINIC | Age: 68
End: 2018-03-23

## 2018-03-23 DIAGNOSIS — E11.9 DIABETES MELLITUS, TYPE 2 (H): Primary | ICD-10-CM

## 2018-03-23 NOTE — TELEPHONE ENCOUNTER
[facility] phone call message- patient requesting a refill:    Full Medication Name: One Touch Ultra Test Strips and Lancets    Dose:     Pharmacy confirmed as   [id number] - [place], [place] - [address]  [address]  [facility] [address]  Phone: [phone] Fax: [phone]  : Yes    Additional Comments:  Patient has One Touch Monitor, but no test strips or lancets to go with it.     OK to leave a message on voice mail? Yes    Primary language: English      needed? No    Call taken on March 23, 2018 at 1:53 PM by Landen Coronado

## 2018-03-27 ENCOUNTER — CARE COORDINATION (OUTPATIENT)
Dept: PSYCHOLOGY | Facility: CLINIC | Age: 68
End: 2018-03-27
Payer: COMMERCIAL

## 2018-03-27 ENCOUNTER — OFFICE VISIT (OUTPATIENT)
Dept: FAMILY MEDICINE | Facility: CLINIC | Age: 68
End: 2018-03-27
Payer: COMMERCIAL

## 2018-03-27 VITALS
WEIGHT: 315 LBS | SYSTOLIC BLOOD PRESSURE: 100 MMHG | DIASTOLIC BLOOD PRESSURE: 61 MMHG | HEART RATE: 93 BPM | BODY MASS INDEX: 51.94 KG/M2 | OXYGEN SATURATION: 96 % | RESPIRATION RATE: 16 BRPM | TEMPERATURE: 98.4 F

## 2018-03-27 DIAGNOSIS — B35.1 ONYCHOMYCOSIS: ICD-10-CM

## 2018-03-27 DIAGNOSIS — I89.0 LYMPHEDEMA: ICD-10-CM

## 2018-03-27 DIAGNOSIS — E03.9 HYPOTHYROIDISM, UNSPECIFIED TYPE: ICD-10-CM

## 2018-03-27 DIAGNOSIS — F41.8 DEPRESSION WITH ANXIETY: ICD-10-CM

## 2018-03-27 DIAGNOSIS — I50.22 CHRONIC SYSTOLIC CONGESTIVE HEART FAILURE (H): Primary | ICD-10-CM

## 2018-03-27 DIAGNOSIS — E11.65 TYPE 2 DIABETES MELLITUS WITH HYPERGLYCEMIA, WITHOUT LONG-TERM CURRENT USE OF INSULIN (H): ICD-10-CM

## 2018-03-27 DIAGNOSIS — I48.20 CHRONIC ATRIAL FIBRILLATION (H): ICD-10-CM

## 2018-03-27 LAB
BUN SERPL-MCNC: 27.8 MG/DL (ref 7–21)
CALCIUM SERPL-MCNC: 9.6 MG/DL (ref 8.5–10.1)
CHLORIDE SERPLBLD-SCNC: 99.9 MMOL/L (ref 98–110)
CO2 SERPL-SCNC: 27.6 MMOL/L (ref 20–32)
CREAT SERPL-MCNC: 1 MG/DL (ref 0.7–1.3)
GFR SERPL CREATININE-BSD FRML MDRD: 79.2 ML/MIN/1.7 M2
GLUCOSE SERPL-MCNC: 117.8 MG'DL (ref 70–99)
HBA1C MFR BLD: 6.3 % (ref 4.1–5.7)
NT-PROBNP SERPL-MCNC: 1060 PG/ML (ref 0–125)
POTASSIUM SERPL-SCNC: 4.2 MMOL/DL (ref 3.3–4.5)
SODIUM SERPL-SCNC: 134 MMOL/L (ref 132.6–141.4)
TSH SERPL DL<=0.005 MIU/L-ACNC: 3.68 MU/L (ref 0.4–4)

## 2018-03-27 NOTE — PROGRESS NOTES
HPI  67 year old male here for evaluation of several issues.    1. CHF    Component      Latest Ref Rng & Units 2/27/2008 9/23/2017 11/27/2017 12/4/2017   N-Terminal Pro BNP Inpatient      0 - 900 pg/mL 2890 (H) 1745 (H)     N-Terminal Pro Bnp      0 - 125 pg/mL   2008 (H) 1101 (H)     Component      Latest Ref Rng & Units 12/21/2017 12/26/2017 1/15/2018 2/26/2018   N-Terminal Pro BNP Inpatient      0 - 900 pg/mL       N-Terminal Pro Bnp      0 - 125 pg/mL 1738 (H) 1590 (H) 1142 (H) 773 (H)     Component      Latest Ref Rng & Units 3/5/2018 3/27/2018   N-Terminal Pro BNP Inpatient      0 - 900 pg/mL     N-Terminal Pro Bnp      0 - 125 pg/mL 780 (H) 1060 (H)     Slight increase in BNP  Slight wt gain  Feels well    2. Thyroid  Needs refill of higher dose    3. Toenails  Cutting thru socks  Get to podiatry earlier  Podiatry getting shoes    4. Mental health  Good candidate for [facility]  Does not qualify d/t insurance    5. Lymphedema  Much improved    6. A-fib   Getting INR checked thru home nursing  Stable  Component      Latest Ref Rng & Units 1/8/2018 1/9/2018 1/10/2018 3/5/2018   INR      0.86 - 1.14 2.24 (H) 2.50 (H) 2.69 (H) 2.53 (H)         ROS  6 point ROS neg other than the symptoms noted above in the HPI.    MEDS  Current Outpatient Prescriptions   Medication     blood glucose monitoring (ONETOUCH ULTRA) test strip     blood glucose monitoring (ONE TOUCH DELICA) lancets     blood glucose monitoring (ONE TOUCH ULTRA MINI) meter device kit     furosemide (LASIX) 20 MG tablet     potassium chloride SA (K-DUR/KLOR-CON M) 20 MEQ CR tablet     atorvastatin (LIPITOR) 40 MG tablet     lisinopril (PRINIVIL/ZESTRIL) 5 MG tablet     order for DME     levothyroxine (SYNTHROID/LEVOTHROID) 175 MCG tablet     spironolactone (ALDACTONE) 25 MG tablet     warfarin (COUMADIN) 5 MG tablet     senna-docusate (SENOKOT-S;PERICOLACE) 8.6-50 MG per tablet     metFORMIN (GLUCOPHAGE) 500 MG tablet     metoprolol (TOPROL-XL) 100 MG 24 hr  tablet     multivitamin, therapeutic with minerals (MULTI-VITAMIN) TABS tablet     omega 3 1000 MG CAPS     polyethylene glycol (MIRALAX/GLYCOLAX) Packet     tamsulosin (FLOMAX) 0.4 MG capsule     order for DME     aspirin 81 MG tablet     No current facility-administered medications for this visit.          SOC      FHx  Family History   Problem Relation Age of Onset     Depression Mother      CANCER No family hx of      No family history of skin cancer       PHYSICAL EXAMINATION:  Vital signs: /61  Pulse 93  Temp 98.4  F (36.9  C) (Oral)  Resp 16  Wt (!) 383 lb (173.7 kg)  SpO2 96%  BMI 51.94 kg/m2    Wt Readings from Last 5 Encounters:   03/27/18 (!) 383 lb (173.7 kg)   02/26/18 (!) 380 lb (172.4 kg)   01/15/18 (!) 375 lb (170.1 kg)   01/10/18 (!) 371 lb 1.6 oz (168.3 kg)   12/26/17 (!) 437 lb 9.6 oz (198.5 kg)         Gen: no distress  Lungs: clear  Cor: regular rhythm today, no S3 S4 murmur or rub  Abd: soft, nontender, no HSM  Ext: velcro wraps on. Much less edema  MSE: cheerful, appropriate.      LABS    Results for orders placed or performed in visit on 03/27/18   TSH with free T4 reflex   Result Value Ref Range    TSH 3.68 0.40 - 4.00 mU/L   Basic Metabolic Panel ([facility])   Result Value Ref Range    Urea Nitrogen 27.8 (H) 7.0 - 21.0 mg/dL    Calcium 9.6 8.5 - 10.1 mg/dL    Chloride 99.9 98.0 - 110.0 mmol/L    Carbon Dioxide 27.6 20.0 - 32.0 mmol/L    Creatinine 1.0 0.7 - 1.3 mg/dL    Glucose 117.8 (H) 70.0 - 99.0 mg'dL    Potassium 4.2 3.3 - 4.5 mmol/dL    Sodium 134.0 132.6 - 141.4 mmol/L    GFR Estimate 79.2 >60.0 mL/min/1.7 m2    GFR Estimate If Black >90 >60.0 mL/min/1.7 m2   Hemoglobin A1c ([facility])   Result Value Ref Range    Hemoglobin A1C 6.3 (H) 4.1 - 5.7 %   N terminal pro BNP outpatient   Result Value Ref Range    N-Terminal Pro Bnp 1060 (H) 0 - 125 pg/mL         ASSESSMENT/PLAN    1. Chronic systolic congestive heart failure (H)  Feels well  BNP higher  Meds: spironolactone 25/25,  "lisinopril 5/5, lasix 80/60    - N terminal pro BNP outpatient  - Basic Metabolic Panel ([facility])    2. Lymphedema  Much improved  Has \"shape\" to his legs    3. Chronic atrial fibrillation (H)  stable    4. Hypothyroidism, unspecified type  TSH good  Refilled higher dose    - levothyroxine (SYNTHROID/LEVOTHROID) 175 MCG tablet; Take 1 tablet (175 mcg) by mouth daily  Dispense: 30 tablet; Refill: 11  - TSH with free T4 reflex    5. Onychomycosis  Needs podiatry earlier for nails    6. Depression with anxiety  BH to interview  Addendum - not accepted insurance    7.  Type 2 diabetes mellitus with hyperglycemia, without long-term current use of insulin (H)  A1c=6.3  Good control with low dose metformin  - Hemoglobin A1c ([facility])    ANAYELI CEJA      40min spent with the patient, >50% in arranging care for CHF and multiple issues  PHarper    "

## 2018-03-27 NOTE — PROGRESS NOTES
"[facility]  No Data Recorded      Social Work Care Navigator Note      Patient: Clarke Moreira  Date: March 27, 2018  Preferred Name: Clarke    Previous PHQ-9:   PHQ-9 SCORE 8/1/2014 9/14/2016 11/27/2017   Total Score 27 - -   Total Score - 27 27     Previous CHRIS-7:   CHRIS-7 SCORE 8/4/2014 11/27/2017   Total Score 18 -   Total Score - 21     LIZANDRO LEVEL:  No flowsheet data found.    Preferred Contact:  No Data Recorded    Type of Contact Today: Face to Face in Clinic      Data: (subjective / Objective):    [facility] Introduction:  Hi my name is Lupe Anderson from your [facility] primary care clinic.     I work closely with your primary care provider, Matthias Sanchez.     If it's ok I'd like to talk about some new services available to you, at no out of pocket cost to you.      Before we get started can you verify your insurance for me? I am on Medicare and do not have medicaid.    Patient was found ineligible for [facility] services, as his insurance is straight medicare and he does not have medicaid.  After discussion of [facility] services, patient stated, \"Some how I knew this was too good to be true.\"  This writer encouraged him to start therapy again.  He stated that he was in therapy for 10 years, however when that relationship ended, he found that he became more reclusive and isolated in his home.  Patient agreed that allow this writer to schedule him for therapy and was encouraged that he would have the support of therapy and care coordination in the clinic.  Patient was pleasant and able to express his feelings easily.  He stated, \"Maybe I do have hope!\" when referring to therapy.            When you come into the clinic there will be a few forms for you to fill out in the lobby.    Patient response to [facility] Service offering:   Not interested enrolling in [facility] services    Lupe Anderson, Social Work Care Coordinator   ________________________________________________________________  Administrative - Social Work " "Staff Info:     Update the [facility] Brief Needs Assessment Flowsheet \"enrollment status\"     \"declined\"    \"considering\" enrolling in [facility] services.      \"Interested and will enroll\"    \"waitlisted\"    Update enrollment status to \"enrolled\" only when they have come into clinic and completed the paper consent and brief needs assessment.    Verify that patient has had Diagnostic Assessment within the past 12 months and if not schedule an appointment with the [facility] to complete.                "

## 2018-03-27 NOTE — MR AVS SNAPSHOT
After Visit Summary   3/27/2018    Clarke Moreira    MRN: [id number]           Patient Information     Date Of Birth          1950        Visit Information        Provider Department      3/27/2018 2:00 PM Matthias Sanchez MD [facility]        Today's Diagnoses     Chronic atrial fibrillation (H)    -  1    Lymphedema        Hypothyroidism, unspecified type           Follow-ups after your visit        Your next 10 appointments already scheduled     2018  2:40 PM CDT   (Arrive by 2:25 PM)   RETURN FOOT/ANKLE with Jesús Lagos DPM   [facility] ([facility])    [address] [place] [address] [id number] [phone]              Who to contact     Please call your clinic at [phone] to:    Ask questions about your health    Make or cancel appointments    Discuss your medicines    Learn about your test results    Speak to your doctor            Additional Information About Your Visit        MyChart Information     flaveitt is an electronic gateway that provides easy, online access to your medical records. With [employer], you can request a clinic appointment, read your test results, renew a prescription or communicate with your care team.     To sign up for flaveitt visit the website at [email or web address]   You will be asked to enter the access code listed below, as well as some personal information. Please follow the directions to create your username and password.     Your access code is: [email or web address]  Expires: 2018  2:11 PM     Your access code will  in 90 days. If you need help or a new code, please contact your [facility] or call [phone] for assistance.        Care EveryWhere ID     This is your Care EveryWhere ID. This could be used by other organizations to access your [facility] medical records  [id number]        Your Vitals Were     Pulse  Temperature Respirations Pulse Oximetry BMI (Body Mass Index)       93 98.4  F (36.9  C) (Oral) 16 96% 51.94 kg/m2        Blood Pressure from Last 3 Encounters:   03/27/18 100/61   02/26/18 100/72   01/15/18 109/70    Weight from Last 3 Encounters:   03/27/18 (!) 383 lb (173.7 kg)   02/26/18 (!) 380 lb (172.4 kg)   01/15/18 (!) 375 lb (170.1 kg)              We Performed the Following     Albumin Random Urine Quantitative with Creat Ratio     Basic Metabolic Panel ([facility])     Hemoglobin A1c ([facility])     N terminal pro BNP outpatient     TSH with free T4 reflex        Primary Care Provider Office Phone # Fax #    Matthias Sanchez [phone]       [address] [address]        Equal Access to Services     [facility] : Hadii kell Cotton, waaxda luqlauro, qaybta kaalmada veronika, vasyl casarez . So [facility] [phone].    ATENCIÓN: Si habla español, tiene a kim disposición servicios gratuitos de asistencia lingüística. Kylesneha al [phone].    We comply with applicable federal civil rights laws and [place] laws. We do not discriminate on the basis of race, color, national origin, age, disability, sex, sexual orientation, or gender identity.            Thank you!     Thank you for choosing [facility]  for your care. Our goal is always to provide you with excellent care. Hearing back from our patients is one way we can continue to improve our services. Please take a few minutes to complete the written survey that you may receive in the mail after your visit with us. Thank you!             Your Updated Medication List - Protect others around you: Learn how to safely use, store and throw away your medicines at www.disposemymeds.org.          This list is accurate as of 3/27/18  2:44 PM.  Always use your most recent med list.                   Brand Name Dispense Instructions for use Diagnosis    aspirin 81 MG  tablet     30 tablet    Take 1 tablet (81 mg) by mouth daily    Essential hypertension       atorvastatin 40 MG tablet    LIPITOR    30 tablet    Take 1 tablet (40 mg) by mouth daily    Type 2 diabetes mellitus without complication, without long-term current use of insulin (H)       blood glucose monitoring lancets     100 each    Use to test blood sugars 2 times daily or as directed.    Diabetes mellitus, type 2 (H)       blood glucose monitoring meter device kit     1 kit    Use to test blood sugars 2 times daily or as directed.    Type 2 diabetes mellitus with hyperglycemia, without long-term current use of insulin (H)       blood glucose monitoring test strip    ONETOUCH ULTRA    100 strip    Use to test blood sugars 2 times daily or as directed.    Diabetes mellitus, type 2 (H)       furosemide 20 MG tablet    LASIX    180 tablet    Take 80 mg in the morning (four pills) and 60 mg (three pills) in the afternoons.    Chronic congestive heart failure, unspecified congestive heart failure type (H)       levothyroxine 175 MCG tablet    SYNTHROID/LEVOTHROID    30 tablet    Take 1 tablet (175 mcg) by mouth daily    Hypothyroidism, unspecified type       lisinopril 5 MG tablet    PRINIVIL/ZESTRIL    60 tablet    Take 1 tablet (5 mg) by mouth 2 times daily    Chronic systolic congestive heart failure (H)       metFORMIN 500 MG tablet    GLUCOPHAGE    60 tablet    Take 1 tablet (500 mg) by mouth 2 times daily (with meals)    Type 2 diabetes mellitus without complication, without long-term current use of insulin (H)       metoprolol succinate 100 MG 24 hr tablet    TOPROL-XL    30 tablet    Take 1 tablet (100 mg) by mouth daily    Essential hypertension with goal blood pressure less than 140/90       multivitamin, therapeutic with minerals Tabs tablet     30 each    Take 1 tablet by mouth daily    Type 2 diabetes mellitus without complication, without long-term current use of insulin (H)       omega 3 1000 MG Caps     30  capsule    Take 1 g by mouth daily    Hyperlipidemia LDL goal <100       * order for DME     2 each    Equipment being ordered: Other: Velcro Compression stockings.  Treatment Diagnosis: bilateral lymphedema.    Lymphedema of extremity       * order for DME     1 Units    Equipment being ordered: Bariatric Lift chair Diagnosis - morbid obesity, CHF, Lymphedema  Fax to Ne from [employer] at [phone].    Chronic systolic congestive heart failure (H), Lymphedema, Morbid obesity (H)       polyethylene glycol Packet    MIRALAX/GLYCOLAX    15 packet    Take 17 g by mouth daily as needed for constipation    Constipation, unspecified constipation type       potassium chloride SA 20 MEQ CR tablet    K-DUR/KLOR-CON M    60 tablet    Take 40 meq in the morning and 20 meq in the afternoons.    Hypokalemia       senna-docusate 8.6-50 MG per tablet    SENOKOT-S;PERICOLACE    60 tablet    Take 1-2 tablets by mouth 2 times daily as needed for constipation    Constipation, unspecified constipation type       spironolactone 25 MG tablet    ALDACTONE    60 tablet    Take 1 tablet (25 mg) by mouth 2 times daily    Chronic systolic congestive heart failure (H)       tamsulosin 0.4 MG capsule    FLOMAX    30 capsule    Take 1 capsule (0.4 mg) by mouth daily    Urinary retention       warfarin 5 MG tablet    COUMADIN    75 tablet    Take 2.5 tablets (12.5 mg) by mouth daily    Persistent atrial fibrillation (H)       * Notice:  This list has 2 medication(s) that are the same as other medications prescribed for you. Read the directions carefully, and ask your doctor or other care provider to review them with you.

## 2018-03-29 PROBLEM — K59.00 CONSTIPATION, UNSPECIFIED CONSTIPATION TYPE: Status: ACTIVE | Noted: 2017-11-10

## 2018-03-29 PROBLEM — I89.0 LYMPHEDEMA: Status: ACTIVE | Noted: 2017-11-10

## 2018-03-29 PROBLEM — I50.22 CHRONIC SYSTOLIC CONGESTIVE HEART FAILURE (H): Status: ACTIVE | Noted: 2017-11-10

## 2018-03-29 PROBLEM — B35.1 ONYCHOMYCOSIS: Status: ACTIVE | Noted: 2018-03-29

## 2018-03-29 PROBLEM — Z99.89 BIPAP (BIPHASIC POSITIVE AIRWAY PRESSURE) DEPENDENCE: Status: ACTIVE | Noted: 2017-11-10

## 2018-03-29 RX ORDER — LEVOTHYROXINE SODIUM 175 UG/1
175 TABLET ORAL DAILY
Qty: 30 TABLET | Refills: 11 | Status: ON HOLD | OUTPATIENT
Start: 2018-03-29 | End: 2019-03-16

## 2018-04-20 ENCOUNTER — TELEPHONE (OUTPATIENT)
Dept: FAMILY MEDICINE | Facility: CLINIC | Age: 68
End: 2018-04-20

## 2018-04-20 NOTE — TELEPHONE ENCOUNTER
[facility] phone call message-patient reporting a symptom:     Symptom:  Patient calling to report a possible panic attack. Patient state he has had problems three times this week with his vision. Patient  States that he was sitting watching TV when he noticed some bright lights and even some blue lights. Patient would like to discuss with a nurse to see if he needs to be soon right away, or  Can he wait to see Dr. Snachez on the 29th of this month. Please advise.       Same Day Visit Offered: no    Additional comments:     OK to leave message on voice mail? Yes    Primary language: English      needed? No    Call taken on April 20, 2018 at 1:10 PM by Yajaira Longoria

## 2018-04-20 NOTE — TELEPHONE ENCOUNTER
RN returned call to patient to discuss symptoms. Pt states today was the third time this week he has had an episode when all the color drains away in his vision and he sees black and white, states his peripheral is blue. States it seems like his vision is pulsating and a little blurry. Returns to normal in 5min. Pt states it happened on Monday or Tuesday, Wednesday and today. Today and wednesday it happened after he ate and was sitting down    Pt denies headache, eye pain or eye drainage, heart racing, recent head injury, flashes of light, dizzy or SOB.   Pt states he is a little lightheaded  And having floaters per usual and his eyes are a little dry     Blood sugar this morning was 114. They have been 105-117 this week     Pt does not have an eye doctor. Pt has appt with PCP on 4/26. RN did recommend if episode happens again he should go to ED for eyes/vision to be evaluated. Pt verbalized understanding    Message routed to PCP as LALO Hartman RN

## 2018-04-26 ENCOUNTER — MEDICAL CORRESPONDENCE (OUTPATIENT)
Dept: HEALTH INFORMATION MANAGEMENT | Facility: CLINIC | Age: 68
End: 2018-04-26

## 2018-04-26 ENCOUNTER — OFFICE VISIT (OUTPATIENT)
Dept: FAMILY MEDICINE | Facility: CLINIC | Age: 68
End: 2018-04-26
Payer: COMMERCIAL

## 2018-04-26 VITALS
WEIGHT: 315 LBS | TEMPERATURE: 98.3 F | DIASTOLIC BLOOD PRESSURE: 73 MMHG | BODY MASS INDEX: 51.94 KG/M2 | OXYGEN SATURATION: 97 % | HEART RATE: 82 BPM | SYSTOLIC BLOOD PRESSURE: 108 MMHG

## 2018-04-26 DIAGNOSIS — I89.0 LYMPHEDEMA: ICD-10-CM

## 2018-04-26 DIAGNOSIS — I50.22 CHRONIC SYSTOLIC CONGESTIVE HEART FAILURE (H): ICD-10-CM

## 2018-04-26 DIAGNOSIS — K02.9 DENTAL CARIES: ICD-10-CM

## 2018-04-26 DIAGNOSIS — I10 ESSENTIAL HYPERTENSION WITH GOAL BLOOD PRESSURE LESS THAN 140/90: ICD-10-CM

## 2018-04-26 DIAGNOSIS — T16.1XXA FOREIGN BODY OF RIGHT EAR, INITIAL ENCOUNTER: ICD-10-CM

## 2018-04-26 DIAGNOSIS — H53.9 VISION CHANGES: Primary | ICD-10-CM

## 2018-04-26 RX ORDER — SPIRONOLACTONE 25 MG/1
25 TABLET ORAL DAILY
Qty: 30 TABLET | Refills: 3 | Status: SHIPPED | OUTPATIENT
Start: 2018-04-26 | End: 2018-06-15

## 2018-04-26 NOTE — PROGRESS NOTES
"HPI  67 year old male here for evaluation of several issues.       1. R ear foreign body  Cotton tip in ear     2. CHF  Wt stable  No persistent shortness of breath      2. Vision changes - TIA vs presycnope vs other    Three episodes  Friday 4/20/18 patient was seen by home care. Reported at that visit that he experienced left arm heaviness, numbness and tingling on 4/16/18.      On Monday or Tuesday (4/16 and 4/17) patient complained of \"visual deficit\" and described feeling of veil over his eyes. States he was watching TV and suddenly felt dizzy.      On 4/20/18 patient experienced second episodes of blurred vision.Iin the AM he felt dizziness when he bent over and picked something up. States he stood up too fast. States he felt his eyes \"bouncing around\" and went from dim to bright to blurred to dark. Patient describes experience as \"psychedelic\" when sitting in the chair patient states color drained from objects and patient saw things in black and white with shimmering color. States the center of his sight was a bright glare. Reports it felt like 10 minutes but patient states it was probably less. Vision returned to normal. Patient states he called the clinic and spoke with Evelyn GERONIMO about the symptoms. Advised patient to be seen in ER if experienced symptoms.      Patient alert and oriented and able to articulate questions and concerns per home care. No issues with breathing. Home care reports lymphedema has improved.      Blood sugars checked weekly between 9-10 AM:  4/20/18: 116  3/30/18: 101  3/29/18: 139  3/16/18: 119    On 4/24 today was the third time he has had an episode when all the color drains away in his vision and he sees black and white, states his peripheral is blue. States it seems like his vision is pulsating and a little blurry. Returns to normal in 5min. Pt states it happened on Monday or Tuesday, Wednesday and today. Today and wednesday it happened after he ate and was sitting down     Pt " denies headache, eye pain or eye drainage, heart racing, recent head injury, flashes of light, dizzy or SOB.   Pt states he is a little lightheaded  And having floaters per usual and his eyes are a little dry      Blood sugars have been 105-117 this week       ROS  6 point ROS neg other than the symptoms noted above in the HPI.    MEDS  Current Outpatient Prescriptions   Medication     aspirin 81 MG tablet     atorvastatin (LIPITOR) 40 MG tablet     blood glucose monitoring (ONE TOUCH DELICA) lancets     blood glucose monitoring (ONE TOUCH ULTRA MINI) meter device kit     blood glucose monitoring (ONETOUCH ULTRA) test strip     furosemide (LASIX) 20 MG tablet     levothyroxine (SYNTHROID/LEVOTHROID) 175 MCG tablet     lisinopril (PRINIVIL/ZESTRIL) 5 MG tablet     metFORMIN (GLUCOPHAGE) 500 MG tablet     metoprolol (TOPROL-XL) 100 MG 24 hr tablet     multivitamin, therapeutic with minerals (MULTI-VITAMIN) TABS tablet     omega 3 1000 MG CAPS     order for DME     order for DME     polyethylene glycol (MIRALAX/GLYCOLAX) Packet     potassium chloride SA (K-DUR/KLOR-CON M) 20 MEQ CR tablet     senna-docusate (SENOKOT-S;PERICOLACE) 8.6-50 MG per tablet     spironolactone (ALDACTONE) 25 MG tablet     tamsulosin (FLOMAX) 0.4 MG capsule     warfarin (COUMADIN) 5 MG tablet     No current facility-administered medications for this visit.          SOC      FHx  Family History   Problem Relation Age of Onset     Depression Mother      CANCER No family hx of      No family history of skin cancer       PHYSICAL EXAMINATION:  Vital signs: /73  Pulse 82  Temp 98.3  F (36.8  C) (Oral)  Wt (!) 383 lb (173.7 kg)  SpO2 97%  BMI 51.94 kg/m2     Wt Readings from Last 5 Encounters:   04/26/18 (!) 383 lb (173.7 kg)   03/27/18 (!) 383 lb (173.7 kg)   02/26/18 (!) 380 lb (172.4 kg)   01/15/18 (!) 375 lb (170.1 kg)   01/10/18 (!) 371 lb 1.6 oz (168.3 kg)       Gen: no distress  HEENT: ears - cotton FB in R ear canal  Lungs:  clear  Cor: RRR, no S3 S4 murmur or rub, no carotid bruits  Abd: soft, nontender, no HSM  Ext: new shoes, lymphedema wraps    Procedure  Ear irrigation and then manual removal of cotton with currette.      LABS        ASSESSMENT/PLAN    1. Vision changes - r/o TIA vs pre-syncope from low BP  3 episodes of visual changes.  Not during times that would be hypoglycemia  Some concern for TIA-like symptoms and high risk with A-fib  BP is low and may contribute to a pre-syncopal situation.    Reduce spironolactone 25 BID --> 25 QD  Monitor BP  Carotid US  ECHO for EF and evidence of Thrombus with a-fib  Eye referral to r/o eye causes of vision changes      - OPHTHALMOLOGY ADULT REFERRAL  - US Carotid Bilateral; Future  - spironolactone (ALDACTONE) 25 MG tablet; Take 1 tablet (25 mg) by mouth daily  Dispense: 30 tablet; Refill: 3    2. Chronic systolic congestive heart failure (H)  Improved overall, but cardiology wanted to do R heart cath several months ago  Will contact Dr Mejia office to determine if and when     - Echocardiogram Complete; Future  - Cardiology for R heart cath    3. Essential hypertension with goal blood pressure less than 140/90  Low BPs in general  Decrease meds but need to be careful of CHF and A-fib    - spironolactone (ALDACTONE) 25 MG tablet; Take 1 tablet (25 mg) by mouth daily  Dispense: 30 tablet; Refill: 3    4. Lymphedema  Improved    5. Foreign Body in R Ear  Cotton tip. Removed with combination of water irrigation and currette    - HC REMOVAL IMPACTED CERUMEN IRRIGATION/LVG UNILAT    6. Call patient  dates for carotid US/ ECHO,   Change in spironolactone - called and informed on 4/26/18  Re: conversation with [facility] office    7. RTC 1mon  BMP, BNP  Wt         ANAYELI CEJA

## 2018-04-26 NOTE — MR AVS SNAPSHOT
After Visit Summary   4/26/2018    Clarke Moreira    MRN: [id number]           Patient Information     Date Of Birth          1950        Visit Information        Provider Department      4/26/2018 2:40 PM Matthias Sanchez MD [facility]        Today's Diagnoses     Vision changes    -  1    Chronic systolic congestive heart failure (H)        Essential hypertension with goal blood pressure less than 140/90        Lymphedema        Foreign body of right ear, initial encounter           Follow-ups after your visit        Additional Services     OPHTHALMOLOGY ADULT REFERRAL       Your provider has referred you to: [facility]: Eye Clinic [facility] [phone]   [email or web address]    Please be aware that coverage of these services is subject to the terms and limitations of your health insurance plan.  Call member services at your health plan with any benefit or coverage questions.      Please bring the following with you to your appointment:    (1) Any X-Rays, CTs or MRIs which have been performed.  Contact the facility where they were done to arrange for  prior to your scheduled appointment.    (2) List of current medications  (3) This referral request   (4) Any documents/labs given to you for this referral                  Your next 10 appointments already scheduled     Jul 06, 2018 10:40 AM CDT   (Arrive by 10:25 AM)   RETURN FOOT/ANKLE with Jesús Lagos DPM   [facility] ([facility])    [address] [place] [address] [phone]              Future tests that were ordered for you today     Open Future Orders        Priority Expected Expires Ordered    US Carotid Bilateral Routine  4/26/2019 4/26/2018    Echocardiogram Complete Routine  4/26/2019 4/26/2018            Who to contact     Please call your clinic at [phone] to:    Ask questions about your  health    Make or cancel appointments    Discuss your medicines    Learn about your test results    Speak to your doctor            Additional Information About Your Visit        [employer] Information     [employer] is an electronic gateway that provides easy, online access to your medical records. With [employer], you can request a clinic appointment, read your test results, renew a prescription or communicate with your care team.     To sign up for [employer] visit the website at [email or web address]   You will be asked to enter the access code listed below, as well as some personal information. Please follow the directions to create your username and password.     Your access code is: [email or web address]  Expires: 2018  2:11 PM     Your access code will  in 90 days. If you need help or a new code, please contact your [facility] or call [phone] for assistance.        Care EveryWhere ID     This is your Care EveryWhere ID. This could be used by other organizations to access your [place] medical records  [id number]        Your Vitals Were     Pulse Temperature Pulse Oximetry BMI (Body Mass Index)          82 98.3  F (36.8  C) (Oral) 97% 51.94 kg/m2         Blood Pressure from Last 3 Encounters:   18 108/73   18 100/61   18 100/72    Weight from Last 3 Encounters:   18 (!) 383 lb (173.7 kg)   18 (!) 383 lb (173.7 kg)   18 (!) 383 lb (173.7 kg)              We Performed the Following     HC REMOVAL IMPACTED CERUMEN IRRIGATION/LVG UNILAT     OPHTHALMOLOGY ADULT REFERRAL          Today's Medication Changes          These changes are accurate as of 18 11:59 PM.  If you have any questions, ask your nurse or doctor.               These medicines have changed or have updated prescriptions.        Dose/Directions    spironolactone 25 MG tablet   Commonly known as:  ALDACTONE   This may have changed:  when to take this   Used for:  Chronic  systolic congestive heart failure (H)        Dose:  25 mg   Take 1 tablet (25 mg) by mouth daily   Quantity:  30 tablet   Refills:  3            Where to get your medicines      These medications were sent to [id number] - [place], [place] - [address]  [address] [facility] [address]     Phone:  [phone]     spironolactone 25 MG tablet                Primary Care Provider Office Phone # Fax #    Matthias Sanchez [phone]       [address]   [facility] [address]        Equal Access to Services     [facility]: Hadii kell andujar hadasho Soomaali, waaxda luqadaha, qaybta kaalmada veronika, vasyl casarez . So [facility] [phone].    ATENCIÓN: Si habla español, tiene a kim disposición servicios gratuitos de asistencia lingüística. [facility] [phone].    We comply with applicable federal civil rights laws and [place] laws. We do not discriminate on the basis of race, color, national origin, age, disability, sex, sexual orientation, or gender identity.            Thank you!     Thank you for choosing [facility]  for your care. Our goal is always to provide you with excellent care. Hearing back from our patients is one way we can continue to improve our services. Please take a few minutes to complete the written survey that you may receive in the mail after your visit with us. Thank you!             Your Updated Medication List - Protect others around you: Learn how to safely use, store and throw away your medicines at www.disposemymeds.org.          This list is accurate as of 4/26/18 11:59 PM.  Always use your most recent med list.                   Brand Name Dispense Instructions for use Diagnosis    aspirin 81 MG tablet     30 tablet    Take 1 tablet (81 mg) by mouth daily    Essential hypertension       atorvastatin 40 MG tablet    LIPITOR    30 tablet    Take 1 tablet (40 mg) by mouth daily    Type 2 diabetes  mellitus without complication, without long-term current use of insulin (H)       blood glucose monitoring lancets     100 each    Use to test blood sugars 2 times daily or as directed.    Diabetes mellitus, type 2 (H)       blood glucose monitoring meter device kit     1 kit    Use to test blood sugars 2 times daily or as directed.    Type 2 diabetes mellitus with hyperglycemia, without long-term current use of insulin (H)       blood glucose monitoring test strip    ONETOUCH ULTRA    100 strip    Use to test blood sugars 2 times daily or as directed.    Diabetes mellitus, type 2 (H)       furosemide 20 MG tablet    LASIX    180 tablet    Take 80 mg in the morning (four pills) and 60 mg (three pills) in the afternoons.    Chronic congestive heart failure, unspecified congestive heart failure type (H)       levothyroxine 175 MCG tablet    SYNTHROID/LEVOTHROID    30 tablet    Take 1 tablet (175 mcg) by mouth daily    Hypothyroidism, unspecified type       lisinopril 5 MG tablet    PRINIVIL/ZESTRIL    60 tablet    Take 1 tablet (5 mg) by mouth 2 times daily    Chronic systolic congestive heart failure (H)       metFORMIN 500 MG tablet    GLUCOPHAGE    60 tablet    Take 1 tablet (500 mg) by mouth 2 times daily (with meals)    Type 2 diabetes mellitus without complication, without long-term current use of insulin (H)       metoprolol succinate 100 MG 24 hr tablet    TOPROL-XL    30 tablet    Take 1 tablet (100 mg) by mouth daily    Essential hypertension with goal blood pressure less than 140/90       multivitamin, therapeutic with minerals Tabs tablet     30 each    Take 1 tablet by mouth daily    Type 2 diabetes mellitus without complication, without long-term current use of insulin (H)       omega 3 1000 MG Caps     30 capsule    Take 1 g by mouth daily    Hyperlipidemia LDL goal <100       * order for DME     2 each    Equipment being ordered: Other: Velcro Compression stockings.  Treatment Diagnosis: bilateral  lymphedema.    Lymphedema of extremity       * order for DME     1 Units    Equipment being ordered: Bariatric Lift chair Diagnosis - morbid obesity, CHF, Lymphedema  Fax to Ne from [employer] at [phone].    Chronic systolic congestive heart failure (H), Lymphedema, Morbid obesity (H)       polyethylene glycol Packet    MIRALAX/GLYCOLAX    15 packet    Take 17 g by mouth daily as needed for constipation    Constipation, unspecified constipation type       potassium chloride SA 20 MEQ CR tablet    K-DUR/KLOR-CON M    60 tablet    Take 40 meq in the morning and 20 meq in the afternoons.    Hypokalemia       senna-docusate 8.6-50 MG per tablet    SENOKOT-S;PERICOLACE    60 tablet    Take 1-2 tablets by mouth 2 times daily as needed for constipation    Constipation, unspecified constipation type       spironolactone 25 MG tablet    ALDACTONE    30 tablet    Take 1 tablet (25 mg) by mouth daily    Chronic systolic congestive heart failure (H)       tamsulosin 0.4 MG capsule    FLOMAX    30 capsule    Take 1 capsule (0.4 mg) by mouth daily    Urinary retention       warfarin 5 MG tablet    COUMADIN    75 tablet    Take 2.5 tablets (12.5 mg) by mouth daily    Persistent atrial fibrillation (H)       * Notice:  This list has 2 medication(s) that are the same as other medications prescribed for you. Read the directions carefully, and ask your doctor or other care provider to review them with you.

## 2018-04-27 ENCOUNTER — OFFICE VISIT (OUTPATIENT)
Dept: ORTHOPEDICS | Facility: CLINIC | Age: 68
End: 2018-04-27
Payer: COMMERCIAL

## 2018-04-27 VITALS — BODY MASS INDEX: 42.66 KG/M2 | HEIGHT: 72 IN | WEIGHT: 315 LBS

## 2018-04-27 DIAGNOSIS — B35.1 DERMATOPHYTOSIS OF NAIL: ICD-10-CM

## 2018-04-27 DIAGNOSIS — M76.61 ACHILLES TENDINITIS OF RIGHT LOWER EXTREMITY: ICD-10-CM

## 2018-04-27 DIAGNOSIS — E11.65 TYPE 2 DIABETES MELLITUS WITH HYPERGLYCEMIA, WITHOUT LONG-TERM CURRENT USE OF INSULIN (H): Primary | ICD-10-CM

## 2018-04-27 DIAGNOSIS — E11.42 DIABETIC POLYNEUROPATHY ASSOCIATED WITH TYPE 2 DIABETES MELLITUS (H): ICD-10-CM

## 2018-04-27 DIAGNOSIS — L60.0 ONYCHOCRYPTOSIS: ICD-10-CM

## 2018-04-27 NOTE — MR AVS SNAPSHOT
After Visit Summary   2018    Clarke Moreira    MRN: [id number]           Patient Information     Date Of Birth          1950        Visit Information        Provider Department      2018 10:00 AM Jesús Lagos DPM [facility]         Follow-ups after your visit        Your next 10 appointments already scheduled     2018 10:40 AM CDT   (Arrive by 10:25 AM)   RETURN FOOT/ANKLE with KIERRA Capone [facility] ([facility])    [address] [place] [id number] [phone]              Future tests that were ordered for you today     Open Future Orders        Priority Expected Expires Ordered    US Carotid Bilateral Routine  2019    Echocardiogram Complete Routine  2019            Who to contact     Please call your clinic at [phone] to:    Ask questions about your health    Make or cancel appointments    Discuss your medicines    Learn about your test results    Speak to your doctor            Additional Information About Your Visit        MyChart Information     [employer] is an electronic gateway that provides easy, online access to your medical records. With [employer], you can request a clinic appointment, read your test results, renew a prescription or communicate with your care team.     To sign up for [employer] visit the website at [email or web address]   You will be asked to enter the access code listed below, as well as some personal information. Please follow the directions to create your username and password.     Your access code is: [email or web address]  Expires: 2018  2:11 PM     Your access code will  in 90 days. If you need help or a new code, please contact your [facility] or call [phone] for assistance.        Care EveryWhere ID     This is your Care EveryWhere ID. This could be used  by other organizations to access your [place] medical records  [id number]        Your Vitals Were     Height BMI (Body Mass Index)                1.829 m (6') 51.94 kg/m2           Blood Pressure from Last 3 Encounters:   04/26/18 108/73   03/27/18 100/61   02/26/18 100/72    Weight from Last 3 Encounters:   04/27/18 (!) 173.7 kg (383 lb)   04/26/18 (!) 173.7 kg (383 lb)   03/27/18 (!) 173.7 kg (383 lb)              Today, you had the following     No orders found for display       Primary Care Provider Office Phone # Fax #    Matthias Sanchez [phone]       [address] [address]        Equal Access to Services     [facility] : Hadbeni enriquez Sodillan, waaxda luqadaha, qaybta kaalmada adeegyanacho, vasyl casarez . So [facility] [phone].    ATENCIÓN: Si habla español, tiene a kim disposición servicios gratuitos de asistencia lingüística. Alexis al [phone].    We comply with applicable federal civil rights laws and [place] laws. We do not discriminate on the basis of race, color, national origin, age, disability, sex, sexual orientation, or gender identity.            Thank you!     Thank you for choosing [facility]  for your care. Our goal is always to provide you with excellent care. Hearing back from our patients is one way we can continue to improve our services. Please take a few minutes to complete the written survey that you may receive in the mail after your visit with us. Thank you!             Your Updated Medication List - Protect others around you: Learn how to safely use, store and throw away your medicines at www.disposemymeds.org.          This list is accurate as of 4/27/18 10:19 AM.  Always use your most recent med list.                   Brand Name Dispense Instructions for use Diagnosis    aspirin 81 MG tablet     30 tablet    Take 1 tablet (81 mg) by mouth daily    Essential hypertension        atorvastatin 40 MG tablet    LIPITOR    30 tablet    Take 1 tablet (40 mg) by mouth daily    Type 2 diabetes mellitus without complication, without long-term current use of insulin (H)       blood glucose monitoring lancets     100 each    Use to test blood sugars 2 times daily or as directed.    Diabetes mellitus, type 2 (H)       blood glucose monitoring meter device kit     1 kit    Use to test blood sugars 2 times daily or as directed.    Type 2 diabetes mellitus with hyperglycemia, without long-term current use of insulin (H)       blood glucose monitoring test strip    ONETOUCH ULTRA    100 strip    Use to test blood sugars 2 times daily or as directed.    Diabetes mellitus, type 2 (H)       furosemide 20 MG tablet    LASIX    180 tablet    Take 80 mg in the morning (four pills) and 60 mg (three pills) in the afternoons.    Chronic congestive heart failure, unspecified congestive heart failure type (H)       levothyroxine 175 MCG tablet    SYNTHROID/LEVOTHROID    30 tablet    Take 1 tablet (175 mcg) by mouth daily    Hypothyroidism, unspecified type       lisinopril 5 MG tablet    PRINIVIL/ZESTRIL    60 tablet    Take 1 tablet (5 mg) by mouth 2 times daily    Chronic systolic congestive heart failure (H)       metFORMIN 500 MG tablet    GLUCOPHAGE    60 tablet    Take 1 tablet (500 mg) by mouth 2 times daily (with meals)    Type 2 diabetes mellitus without complication, without long-term current use of insulin (H)       metoprolol succinate 100 MG 24 hr tablet    TOPROL-XL    30 tablet    Take 1 tablet (100 mg) by mouth daily    Essential hypertension with goal blood pressure less than 140/90       multivitamin, therapeutic with minerals Tabs tablet     30 each    Take 1 tablet by mouth daily    Type 2 diabetes mellitus without complication, without long-term current use of insulin (H)       omega 3 1000 MG Caps     30 capsule    Take 1 g by mouth daily    Hyperlipidemia LDL goal <100       * order for DME      2 each    Equipment being ordered: Other: Velcro Compression stockings.  Treatment Diagnosis: bilateral lymphedema.    Lymphedema of extremity       * order for DME     1 Units    Equipment being ordered: Bariatric Lift chair Diagnosis - morbid obesity, CHF, Lymphedema  Fax to Ne from [employer] at [phone].    Chronic systolic congestive heart failure (H), Lymphedema, Morbid obesity (H)       polyethylene glycol Packet    MIRALAX/GLYCOLAX    15 packet    Take 17 g by mouth daily as needed for constipation    Constipation, unspecified constipation type       potassium chloride SA 20 MEQ CR tablet    K-DUR/KLOR-CON M    60 tablet    Take 40 meq in the morning and 20 meq in the afternoons.    Hypokalemia       senna-docusate 8.6-50 MG per tablet    SENOKOT-S;PERICOLACE    60 tablet    Take 1-2 tablets by mouth 2 times daily as needed for constipation    Constipation, unspecified constipation type       spironolactone 25 MG tablet    ALDACTONE    30 tablet    Take 1 tablet (25 mg) by mouth daily    Chronic systolic congestive heart failure (H)       tamsulosin 0.4 MG capsule    FLOMAX    30 capsule    Take 1 capsule (0.4 mg) by mouth daily    Urinary retention       warfarin 5 MG tablet    COUMADIN    75 tablet    Take 2.5 tablets (12.5 mg) by mouth daily    Persistent atrial fibrillation (H)       * Notice:  This list has 2 medication(s) that are the same as other medications prescribed for you. Read the directions carefully, and ask your doctor or other care provider to review them with you.

## 2018-04-27 NOTE — PROGRESS NOTES
Chief Complaint:   Chief Complaint   Patient presents with     Diabetic Foot Exam     3 month f/u, lump on right achilles since since started using new diabetic shoes a couple weeks ago        No Known Allergies      Subjective: Clarke is a 67 year old male who presents to the clinic today for a diabetic foot exam and management.  He relates that since his last visit, he has now gone to Velcro compression wraps on both legs.  He relates that this is significantly help to reduce edema in his legs.  He relates that his legs are almost normal size.  He has been trying to control his blood sugar is normal.  He does have a new pair of diabetic shoes.  However, since wearing them, he is noted a lump in his right Achilles tendon.  He notes that this area does hurt some.  He was last seen 3 months ago, but his nails are very long ingrown on his toes.    Objective  Physical exam was limited today by the lymphedema wraps on both legs.  I can see the the distal aspect of both digits.  DP and PT pulses cannot be palpated.  Diminished pedal hair.  Protective sensation cannot be tested today.  He does relate some numbness in the bottom of both feet.  Nails are thickened, elongated, yellow, brittle, with subungual debris debris consistent with onychomycosis ×10. Onychocryptosis of the left medial hallux without s/s of infection.   There is some thickening to the right distal Achilles, with pain noted with palpation of this area. MMT 5/5 on the right, but is painful with plantarflexion of the ankle. No tendon voids noted.      Assessment: Type 2 diabetes with neuropathy.    Onychomycosis ×10.    Lymphedema  Achilles tendonitis on the right ankle.      Plan:  - Pt seen and evaluated.  - Nails were debrided x 10.   - I have dispensed a heel lift for the right foot. If it is feeling too harsh, he can modify this down a level. Can escalate to PT.   - See again in 2 months.

## 2018-04-27 NOTE — LETTER
4/27/2018       RE: Clarke Moreira  [address]  [facility] [address]     Dear Colleague,    Thank you for referring your patient, Clarke Moreira, to the [facility] at [facility]. Please see a copy of my visit note below.      Chief Complaint:   Chief Complaint   Patient presents with     Diabetic Foot Exam     3 month f/u, lump on right achilles since since started using new diabetic shoes a couple weeks ago        No Known Allergies      Subjective: Clarke is a 67 year old male who presents to the clinic today for a diabetic foot exam and management.  He relates that since his last visit, he has now gone to Velcro compression wraps on both legs.  He relates that this is significantly help to reduce edema in his legs.  He relates that his legs are almost normal size.  He has been trying to control his blood sugar is normal.  He does have a new pair of diabetic shoes.  However, since wearing them, he is noted a lump in his right Achilles tendon.  He notes that this area does hurt some.  He was last seen 3 months ago, but his nails are very long ingrown on his toes.    Objective  Physical exam was limited today by the lymphedema wraps on both legs.  I can see the the distal aspect of both digits.  DP and PT pulses cannot be palpated.  Diminished pedal hair.  Protective sensation cannot be tested today.  He does relate some numbness in the bottom of both feet.  Nails are thickened, elongated, yellow, brittle, with subungual debris debris consistent with onychomycosis ×10. Onychocryptosis of the left medial hallux without s/s of infection.   There is some thickening to the right distal Achilles, with pain noted with palpation of this area. MMT 5/5 on the right, but is painful with plantarflexion of the ankle. No tendon voids noted.      Assessment: Type 2 diabetes with neuropathy.    Onychomycosis ×10.    Lymphedema  Achilles tendonitis on  the right ankle.      Plan:  - Pt seen and evaluated.  - Nails were debrided x 10.   - I have dispensed a heel lift for the right foot. If it is feeling too harsh, he can modify this down a level. Can escalate to PT.   - See again in 2 months.        Again, thank you for allowing me to participate in the care of your patient.      Sincerely,    Jesús Lagos DPM

## 2018-04-27 NOTE — NURSING NOTE
Chief Complaint   Patient presents with     Diabetic Foot Exam     3 month f/u, lump on right achilles since since started using new diabetic shoes a couple weeks ago       Pain Assessment  Patient Currently in Pain: Yes  0-10 Pain Scale: 5  Primary Pain Location: Foot  Pain Orientation: Right, Left  Pain Descriptors: Sore  Aggravating Factors:  (flexes feet, stepping up and down)               Ht 1.829 m (6')  Wt (!) 173.7 kg (383 lb)  BMI 51.94 kg/m2    No Known Allergies    Current Outpatient Prescriptions   Medication Sig Dispense Refill     aspirin 81 MG tablet Take 1 tablet (81 mg) by mouth daily 30 tablet 0     atorvastatin (LIPITOR) 40 MG tablet Take 1 tablet (40 mg) by mouth daily 30 tablet 11     blood glucose monitoring (ONE TOUCH DELICA) lancets Use to test blood sugars 2 times daily or as directed. 100 each 11     blood glucose monitoring (ONE TOUCH ULTRA MINI) meter device kit Use to test blood sugars 2 times daily or as directed. 1 kit 0     blood glucose monitoring (ONETOUCH ULTRA) test strip Use to test blood sugars 2 times daily or as directed. 100 strip 11     furosemide (LASIX) 20 MG tablet Take 80 mg in the morning (four pills) and 60 mg (three pills) in the afternoons. 180 tablet 6     levothyroxine (SYNTHROID/LEVOTHROID) 175 MCG tablet Take 1 tablet (175 mcg) by mouth daily 30 tablet 11     lisinopril (PRINIVIL/ZESTRIL) 5 MG tablet Take 1 tablet (5 mg) by mouth 2 times daily 60 tablet 3     metFORMIN (GLUCOPHAGE) 500 MG tablet Take 1 tablet (500 mg) by mouth 2 times daily (with meals) 60 tablet 11     metoprolol (TOPROL-XL) 100 MG 24 hr tablet Take 1 tablet (100 mg) by mouth daily 30 tablet 11     multivitamin, therapeutic with minerals (MULTI-VITAMIN) TABS tablet Take 1 tablet by mouth daily 30 each 11     omega 3 1000 MG CAPS Take 1 g by mouth daily 30 capsule 11     order for DME Equipment being ordered: Other: Velcro Compression stockings.   Treatment Diagnosis: bilateral lymphedema. 2  each 0     order for DME Equipment being ordered: Bariatric Lift chair  Diagnosis - morbid obesity, CHF, Lymphedema    Fax to Ne from [employer] at [phone]. 1 Units 0     polyethylene glycol (MIRALAX/GLYCOLAX) Packet Take 17 g by mouth daily as needed for constipation 15 packet 11     potassium chloride SA (K-DUR/KLOR-CON M) 20 MEQ CR tablet Take 40 meq in the morning and 20 meq in the afternoons. 60 tablet 11     senna-docusate (SENOKOT-S;PERICOLACE) 8.6-50 MG per tablet Take 1-2 tablets by mouth 2 times daily as needed for constipation 60 tablet 11     spironolactone (ALDACTONE) 25 MG tablet Take 1 tablet (25 mg) by mouth daily 30 tablet 3     tamsulosin (FLOMAX) 0.4 MG capsule Take 1 capsule (0.4 mg) by mouth daily 30 capsule 11     warfarin (COUMADIN) 5 MG tablet Take 2.5 tablets (12.5 mg) by mouth daily 75 tablet 11       Shey Loera CMA  4/27/2018

## 2018-05-04 ENCOUNTER — TELEPHONE (OUTPATIENT)
Dept: FAMILY MEDICINE | Facility: CLINIC | Age: 68
End: 2018-05-04

## 2018-05-04 NOTE — TELEPHONE ENCOUNTER
[facility] phone call message - order or referral request for patient:     Order or referral being requested:     1.OT Evaluation and Treat ADL    2.Lymphedema /OT Eval for compression stockings.     3.PT for exercise and strengthening        Additional Comments: none noted     OK to leave a message on voice mail? Yes    Primary language: English      needed? No    Call taken on May 4, 2018 at 10:03 AM by Lindsay Fernandez

## 2018-05-11 ENCOUNTER — TELEPHONE (OUTPATIENT)
Dept: FAMILY MEDICINE | Facility: CLINIC | Age: 68
End: 2018-05-11

## 2018-05-11 NOTE — TELEPHONE ENCOUNTER
[facility] phone call message - order or referral request for patient:     Order or referral being requested: PT      Additional Comments: Delmy called to report that she did an initial Home PT today and requesting an order for continuing PT once a week for 3 weeks for strength, gait and balance.     OK to leave a message on voice mail? Yes    Primary language: English      needed? No    Call taken on May 11, 2018 at 4:43 PM by Sandhya Cha

## 2018-05-18 ENCOUNTER — RADIANT APPOINTMENT (OUTPATIENT)
Dept: CARDIOLOGY | Facility: CLINIC | Age: 68
End: 2018-05-18
Attending: FAMILY MEDICINE
Payer: COMMERCIAL

## 2018-05-18 ENCOUNTER — RADIANT APPOINTMENT (OUTPATIENT)
Dept: ULTRASOUND IMAGING | Facility: CLINIC | Age: 68
End: 2018-05-18
Attending: FAMILY MEDICINE
Payer: COMMERCIAL

## 2018-05-18 DIAGNOSIS — H53.9 VISION CHANGES: ICD-10-CM

## 2018-05-18 DIAGNOSIS — I50.22 CHRONIC SYSTOLIC CONGESTIVE HEART FAILURE (H): ICD-10-CM

## 2018-05-18 RX ADMIN — Medication 6 ML: at 11:00

## 2018-05-21 ENCOUNTER — OFFICE VISIT (OUTPATIENT)
Dept: FAMILY MEDICINE | Facility: CLINIC | Age: 68
End: 2018-05-21
Payer: COMMERCIAL

## 2018-05-21 VITALS
SYSTOLIC BLOOD PRESSURE: 104 MMHG | BODY MASS INDEX: 52.3 KG/M2 | RESPIRATION RATE: 16 BRPM | OXYGEN SATURATION: 95 % | WEIGHT: 315 LBS | TEMPERATURE: 98.9 F | DIASTOLIC BLOOD PRESSURE: 59 MMHG | HEART RATE: 84 BPM

## 2018-05-21 DIAGNOSIS — I48.20 CHRONIC ATRIAL FIBRILLATION (H): ICD-10-CM

## 2018-05-21 DIAGNOSIS — H53.9 VISION CHANGES: Primary | ICD-10-CM

## 2018-05-21 DIAGNOSIS — I50.22 CHRONIC SYSTOLIC CONGESTIVE HEART FAILURE (H): ICD-10-CM

## 2018-05-21 LAB
BUN SERPL-MCNC: 15.9 MG/DL (ref 7–21)
CALCIUM SERPL-MCNC: 9.7 MG/DL (ref 8.5–10.1)
CHLORIDE SERPLBLD-SCNC: 98.8 MMOL/L (ref 98–110)
CO2 SERPL-SCNC: 28.2 MMOL/L (ref 20–32)
CREAT SERPL-MCNC: 1 MG/DL (ref 0.7–1.3)
GFR SERPL CREATININE-BSD FRML MDRD: 79.2 ML/MIN/1.7 M2
GLUCOSE SERPL-MCNC: 119.4 MG'DL (ref 70–99)
INR PPP: 2.8
NT-PROBNP SERPL-MCNC: 1443 PG/ML (ref 0–125)
POTASSIUM SERPL-SCNC: 4.1 MMOL/DL (ref 3.3–4.5)
SODIUM SERPL-SCNC: 136.8 MMOL/L (ref 132.6–141.4)

## 2018-05-21 NOTE — LETTER
May 25, 2018      Clarke Moreira  [address]  [facility] [address]        Dear Clarke,    Thank you for getting your care at [facility]. Please see below for your test results.  Your heart test (BNP) is starting to increase again (but it is wildly better than a year ago). We will watch your weight and breathing.    Resulted Orders   Basic Metabolic Panel ([facility])   Result Value Ref Range    Urea Nitrogen 15.9 7.0 - 21.0 mg/dL    Calcium 9.7 8.5 - 10.1 mg/dL    Chloride 98.8 98.0 - 110.0 mmol/L    Carbon Dioxide 28.2 20.0 - 32.0 mmol/L    Creatinine 1.0 0.7 - 1.3 mg/dL    Glucose 119.4 (H) 70.0 - 99.0 mg'dL    Potassium 4.1 3.3 - 4.5 mmol/dL    Sodium 136.8 132.6 - 141.4 mmol/L    GFR Estimate 79.2 >60.0 mL/min/1.7 m2    GFR Estimate If Black >90 >60.0 mL/min/1.7 m2   N terminal pro BNP outpatient   Result Value Ref Range    N-Terminal Pro Bnp 1443 (H) 0 - 125 pg/mL      Comment:         Reference range shown and results flagged as abnormal are for the outpatient,   non acute settings. Establishing a baseline value for each individual patient   is useful for follow-up.  Suggested inpatient cut points for confirming diagnosis of CHF in an acute   setting are:   >450 pg/mL (age 18 to less than 50)   >900 pg/mL (age 50 to less than 75)   >1800 pg/mL (75 yrs and older)  An inpatient or emergency department NT-proPBNP <300 pg/mL effectively rules   out acute CHF, with 99% negative predictive value.      INR ([facility])   Result Value Ref Range    INR 2.8       Comment:      Adult Normal Range: 0.90 - 1.10  Therapeutic Range: 2.0 - 3.5             Sincerely,    Matthias Sanchez MD

## 2018-05-21 NOTE — MR AVS SNAPSHOT
After Visit Summary   2018    Clarke Moreira    MRN: [id number]           Patient Information     Date Of Birth          1950        Visit Information        Provider Department      2018 10:20 AM Matthias Sanchez MD [facility]        Today's Diagnoses     Vision changes    -  1    Chronic systolic congestive heart failure (H)        Chronic atrial fibrillation (H)           Follow-ups after your visit        Your next 10 appointments already scheduled     2018 10:40 AM CDT   (Arrive by 10:25 AM)   RETURN FOOT/ANKLE with Jesús Lagos DPM   [facility] ([facility])    [address] [place] [address]  [address] [facility] [address] [phone]              Who to contact     Please call your clinic at [phone] to:    Ask questions about your health    Make or cancel appointments    Discuss your medicines    Learn about your test results    Speak to your doctor            Additional Information About Your Visit        MyChart Information     [employer] is an electronic gateway that provides easy, online access to your medical records. With [employer], you can request a clinic appointment, read your test results, renew a prescription or communicate with your care team.     To sign up for [employer] visit the website at [email or web address]   You will be asked to enter the access code listed below, as well as some personal information. Please follow the directions to create your username and password.     Your access code is: [email or web address]  Expires: 2018  2:11 PM     Your access code will  in 90 days. If you need help or a new code, please contact your [facility] or call [phone] for assistance.        Care EveryWhere ID     This is your Care EveryWhere ID. This could be used by other organizations to access your [place] medical records  [id number]        Your  Vitals Were     Pulse Temperature Respirations Pulse Oximetry BMI (Body Mass Index)       84 98.9  F (37.2  C) (Oral) 16 95% 52.3 kg/m2        Blood Pressure from Last 3 Encounters:   05/21/18 104/59   04/26/18 108/73   03/27/18 100/61    Weight from Last 3 Encounters:   05/21/18 (!) 385 lb 9.6 oz (174.9 kg)   04/27/18 (!) 383 lb (173.7 kg)   04/26/18 (!) 383 lb (173.7 kg)              We Performed the Following     Basic Metabolic Panel ([facility])     INR ([facility])     N terminal pro BNP outpatient        Primary Care Provider Office Phone # Fax #    Matthias Sanchez [phone]       [address] [address]        Equal Access to Services     [facility] : Hadii kell enriquez Sodillan, waaxda luqadaha, qaybta kaalmanacho banda, vasyl casarez . So [facility] [phone].    ATENCIÓN: Si habla español, tiene a kim disposición servicios gratuitos de asistencia lingüística. Alexis al [phone].    We comply with applicable federal civil rights laws and [place] laws. We do not discriminate on the basis of race, color, national origin, age, disability, sex, sexual orientation, or gender identity.            Thank you!     Thank you for choosing [facility]  for your care. Our goal is always to provide you with excellent care. Hearing back from our patients is one way we can continue to improve our services. Please take a few minutes to complete the written survey that you may receive in the mail after your visit with us. Thank you!             Your Updated Medication List - Protect others around you: Learn how to safely use, store and throw away your medicines at www.disposemymeds.org.          This list is accurate as of 5/21/18 12:42 PM.  Always use your most recent med list.                   Brand Name Dispense Instructions for use Diagnosis    aspirin 81 MG tablet     30 tablet    Take 1 tablet (81 mg) by mouth daily     Essential hypertension       atorvastatin 40 MG tablet    LIPITOR    30 tablet    Take 1 tablet (40 mg) by mouth daily    Type 2 diabetes mellitus without complication, without long-term current use of insulin (H)       blood glucose monitoring lancets     100 each    Use to test blood sugars 2 times daily or as directed.    Diabetes mellitus, type 2 (H)       blood glucose monitoring meter device kit     1 kit    Use to test blood sugars 2 times daily or as directed.    Type 2 diabetes mellitus with hyperglycemia, without long-term current use of insulin (H)       blood glucose monitoring test strip    ONETOUCH ULTRA    100 strip    Use to test blood sugars 2 times daily or as directed.    Diabetes mellitus, type 2 (H)       furosemide 20 MG tablet    LASIX    180 tablet    Take 80 mg in the morning (four pills) and 60 mg (three pills) in the afternoons.    Chronic congestive heart failure, unspecified congestive heart failure type (H)       levothyroxine 175 MCG tablet    SYNTHROID/LEVOTHROID    30 tablet    Take 1 tablet (175 mcg) by mouth daily    Hypothyroidism, unspecified type       lisinopril 5 MG tablet    PRINIVIL/ZESTRIL    60 tablet    Take 1 tablet (5 mg) by mouth 2 times daily    Chronic systolic congestive heart failure (H)       metFORMIN 500 MG tablet    GLUCOPHAGE    60 tablet    Take 1 tablet (500 mg) by mouth 2 times daily (with meals)    Type 2 diabetes mellitus without complication, without long-term current use of insulin (H)       metoprolol succinate 100 MG 24 hr tablet    TOPROL-XL    30 tablet    Take 1 tablet (100 mg) by mouth daily    Essential hypertension with goal blood pressure less than 140/90       multivitamin, therapeutic with minerals Tabs tablet     30 each    Take 1 tablet by mouth daily    Type 2 diabetes mellitus without complication, without long-term current use of insulin (H)       omega 3 1000 MG Caps     30 capsule    Take 1 g by mouth daily    Hyperlipidemia LDL goal  <100       * order for DME     2 each    Equipment being ordered: Other: Velcro Compression stockings.  Treatment Diagnosis: bilateral lymphedema.    Lymphedema of extremity       * order for DME     1 Units    Equipment being ordered: Bariatric Lift chair Diagnosis - morbid obesity, CHF, Lymphedema  Fax to Ne from [employer] at [phone].    Chronic systolic congestive heart failure (H), Lymphedema, Morbid obesity (H)       polyethylene glycol Packet    MIRALAX/GLYCOLAX    15 packet    Take 17 g by mouth daily as needed for constipation    Constipation, unspecified constipation type       potassium chloride SA 20 MEQ CR tablet    K-DUR/KLOR-CON M    60 tablet    Take 40 meq in the morning and 20 meq in the afternoons.    Hypokalemia       senna-docusate 8.6-50 MG per tablet    SENOKOT-S;PERICOLACE    60 tablet    Take 1-2 tablets by mouth 2 times daily as needed for constipation    Constipation, unspecified constipation type       spironolactone 25 MG tablet    ALDACTONE    30 tablet    Take 1 tablet (25 mg) by mouth daily    Chronic systolic congestive heart failure (H)       tamsulosin 0.4 MG capsule    FLOMAX    30 capsule    Take 1 capsule (0.4 mg) by mouth daily    Urinary retention       warfarin 5 MG tablet    COUMADIN    75 tablet    Take 2.5 tablets (12.5 mg) by mouth daily    Persistent atrial fibrillation (H)       * Notice:  This list has 2 medication(s) that are the same as other medications prescribed for you. Read the directions carefully, and ask your doctor or other care provider to review them with you.

## 2018-05-21 NOTE — LETTER
May 21, 2018      Clarke Moreira  [address]  [facility] [address]        Dear Clarke,    Thank you for getting your care at [facility]. Please see below for your test results.  Kidneys and electrolytes are good on the changed dose of spironolactone      Resulted Orders   Basic Metabolic Panel ([facility])   Result Value Ref Range    Urea Nitrogen 15.9 7.0 - 21.0 mg/dL    Calcium 9.7 8.5 - 10.1 mg/dL    Chloride 98.8 98.0 - 110.0 mmol/L    Carbon Dioxide 28.2 20.0 - 32.0 mmol/L    Creatinine 1.0 0.7 - 1.3 mg/dL    Glucose 119.4 (H) 70.0 - 99.0 mg'dL    Potassium 4.1 3.3 - 4.5 mmol/dL    Sodium 136.8 132.6 - 141.4 mmol/L    GFR Estimate 79.2 >60.0 mL/min/1.7 m2    GFR Estimate If Black >90 >60.0 mL/min/1.7 m2   INR ([facility])   Result Value Ref Range    INR 2.8       Comment:      Adult Normal Range: 0.90 - 1.10  Therapeutic Range: 2.0 - 3.5             Sincerely,    Matthias Sanchez MD

## 2018-05-21 NOTE — PROGRESS NOTES
"      HPI:       Clarke Moreira is a 67 year old who presents for the following  Patient presents with:  RECHECK: diabetes  Results: Ultrasound and Echo test        Concern: Follow up Diabetes  1. CHF  Wt up 2-3# with reduced spironolactone  Fingers feel tight in AM  Some R hand numbness that goes away quickly with exercises    2. Vision changes  -Echo and Carotid Ultrasond from 5/18/18 reviewed  - no pathology  -no further episodes  - reduced spironolactone due to low BP      3. Lymphedema  Legs still good with wraps/other cares    4. A-fib  Check INR - good      5. Diabetes  - no concerns today. Glucose levels have been \"good\" per pt. Sxs free.         Problem, Medication and Allergy Lists were reviewed and are current.  Patient is an established patient of this clinic.         Review of Systems:   Review of Systems   Negative ROS except as noted above in HPI         Physical Exam:     Patient Vitals for the past 24 hrs:   BP Temp Temp src Pulse Resp SpO2 Weight   05/21/18 1008 104/59 98.9  F (37.2  C) Oral 84 16 95 % (!) 385 lb 9.6 oz (174.9 kg)     Body mass index is 52.3 kg/(m^2).  Vitals were reviewed and were normal    Wt Readings from Last 5 Encounters:   05/21/18 (!) 385 lb 9.6 oz (174.9 kg)   04/27/18 (!) 383 lb (173.7 kg)   04/26/18 (!) 383 lb (173.7 kg)   03/27/18 (!) 383 lb (173.7 kg)   02/26/18 (!) 380 lb (172.4 kg)          Physical Exam  Gen: no distress  Lungs: clear  Cor: irreg  no S3 S4 murmur or rub  Abd: soft, nontender, no HSM  Ext: special velcro wraps      Results:     PHQ9=18  PHQ-9 SCORE 8/1/2014 9/14/2016 11/27/2017   Total Score 27 - -   Total Score - 27 27     Results for orders placed or performed in visit on 05/21/18   Basic Metabolic Panel ([facility])   Result Value Ref Range    Urea Nitrogen 15.9 7.0 - 21.0 mg/dL    Calcium 9.7 8.5 - 10.1 mg/dL    Chloride 98.8 98.0 - 110.0 mmol/L    Carbon Dioxide 28.2 20.0 - 32.0 mmol/L    Creatinine 1.0 0.7 - 1.3 mg/dL    Glucose 119.4 (H) 70.0 " - 99.0 mg'dL    Potassium 4.1 3.3 - 4.5 mmol/dL    Sodium 136.8 132.6 - 141.4 mmol/L    GFR Estimate 79.2 >60.0 mL/min/1.7 m2    GFR Estimate If Black >90 >60.0 mL/min/1.7 m2   INR ([facility])   Result Value Ref Range    INR 2.8          Assessment and Plan     1. Vision changes  No further episodes  Likely due to low BP  Carotid US with stenosis  ECHO with atrial clot  If recurrence, reduce lasix or metoprolol    2. Chronic systolic congestive heart failure (H)  2# wt gain only with change in spironolactone  Message sent to cardiology to plan right heart cath  They will contact patient and schedule    - Basic Metabolic Panel ([facility])  - N terminal pro BNP outpatient    3. Chronic atrial fibrillation (H)  Stable anticoagulation  - INR ([facility])    4. RTC 1 month  Wt  Labs - BMP, BNP, INR  Check status of RHC      Options for treatment and follow-up care were reviewed with the patient. Clarke Moreira  engaged in the decision making process and verbalized understanding of the options discussed and agreed with the final plan.    Matthias Sanchez MD

## 2018-05-22 ASSESSMENT — PATIENT HEALTH QUESTIONNAIRE - PHQ9: SUM OF ALL RESPONSES TO PHQ QUESTIONS 1-9: 18

## 2018-05-25 DIAGNOSIS — Z13.9 SCREENING FOR CONDITION: Primary | ICD-10-CM

## 2018-06-01 ENCOUNTER — CARE COORDINATION (OUTPATIENT)
Dept: CARDIOLOGY | Facility: CLINIC | Age: 68
End: 2018-06-01

## 2018-06-01 NOTE — PROGRESS NOTES
In preparation for Mr. Moreira's upcoming hospital admission for LHC/RHC and INR = 2.3, the following orders given and communicated to the patient:     Date: 6/1/2018    Time of Call: 10:12 AM     Diagnosis:  Heart failure     [ TORB ] Ordering provider: Alisha KIRK CNP  Order: Take 10 mg of coumadin tonight 6/1, tomorrow night 6/2, and Sunday night 6/3.      Order received by: Amira Doll RN     Follow-up/additional notes: Pt will be a direct admit to [facility] on 6/4 for heparin bridging prior to LHC/RHC. Instructions provided to patient.

## 2018-06-04 ENCOUNTER — HOSPITAL ENCOUNTER (INPATIENT)
Facility: CLINIC | Age: 68
LOS: 4 days | Discharge: HOME HEALTH | DRG: 287 | End: 2018-06-08
Attending: INTERNAL MEDICINE | Admitting: INTERNAL MEDICINE
Payer: COMMERCIAL

## 2018-06-04 DIAGNOSIS — I50.30 (HFPEF) HEART FAILURE WITH PRESERVED EJECTION FRACTION (H): ICD-10-CM

## 2018-06-04 DIAGNOSIS — I89.0 LYMPHEDEMA: Primary | ICD-10-CM

## 2018-06-04 LAB
ALBUMIN SERPL-MCNC: 3.3 G/DL (ref 3.4–5)
ALP SERPL-CCNC: 79 U/L (ref 40–150)
ALT SERPL W P-5'-P-CCNC: 22 U/L (ref 0–70)
ANION GAP SERPL CALCULATED.3IONS-SCNC: 7 MMOL/L (ref 3–14)
AST SERPL W P-5'-P-CCNC: 16 U/L (ref 0–45)
BASOPHILS # BLD AUTO: 0 10E9/L (ref 0–0.2)
BASOPHILS NFR BLD AUTO: 0.3 %
BILIRUB SERPL-MCNC: 1.1 MG/DL (ref 0.2–1.3)
BUN SERPL-MCNC: 14 MG/DL (ref 7–30)
CALCIUM SERPL-MCNC: 9.2 MG/DL (ref 8.5–10.1)
CHLORIDE SERPL-SCNC: 104 MMOL/L (ref 94–109)
CO2 SERPL-SCNC: 30 MMOL/L (ref 20–32)
CREAT SERPL-MCNC: 0.94 MG/DL (ref 0.66–1.25)
DIFFERENTIAL METHOD BLD: ABNORMAL
EOSINOPHIL # BLD AUTO: 0.2 10E9/L (ref 0–0.7)
EOSINOPHIL NFR BLD AUTO: 2 %
ERYTHROCYTE [DISTWIDTH] IN BLOOD BY AUTOMATED COUNT: 13.7 % (ref 10–15)
GFR SERPL CREATININE-BSD FRML MDRD: 80 ML/MIN/1.7M2
GLUCOSE BLDC GLUCOMTR-MCNC: 89 MG/DL (ref 70–99)
GLUCOSE SERPL-MCNC: 91 MG/DL (ref 70–99)
HCT VFR BLD AUTO: 40.8 % (ref 40–53)
HGB BLD-MCNC: 13.5 G/DL (ref 13.3–17.7)
IMM GRANULOCYTES # BLD: 0 10E9/L (ref 0–0.4)
IMM GRANULOCYTES NFR BLD: 0.2 %
INR PPP: 2.03 (ref 0.86–1.14)
LYMPHOCYTES # BLD AUTO: 2.5 10E9/L (ref 0.8–5.3)
LYMPHOCYTES NFR BLD AUTO: 26.8 %
MAGNESIUM SERPL-MCNC: 2 MG/DL (ref 1.6–2.3)
MCH RBC QN AUTO: 30.9 PG (ref 26.5–33)
MCHC RBC AUTO-ENTMCNC: 33.1 G/DL (ref 31.5–36.5)
MCV RBC AUTO: 93 FL (ref 78–100)
MONOCYTES # BLD AUTO: 0.8 10E9/L (ref 0–1.3)
MONOCYTES NFR BLD AUTO: 8.1 %
NEUTROPHILS # BLD AUTO: 5.8 10E9/L (ref 1.6–8.3)
NEUTROPHILS NFR BLD AUTO: 62.6 %
NRBC # BLD AUTO: 0 10*3/UL
NRBC BLD AUTO-RTO: 0 /100
NT-PROBNP SERPL-MCNC: 1621 PG/ML (ref 0–900)
PLATELET # BLD AUTO: 195 10E9/L (ref 150–450)
PLATELET # BLD EST: ABNORMAL 10*3/UL
POTASSIUM SERPL-SCNC: 4.9 MMOL/L (ref 3.4–5.3)
PROT SERPL-MCNC: 6.8 G/DL (ref 6.8–8.8)
RBC # BLD AUTO: 4.37 10E12/L (ref 4.4–5.9)
SODIUM SERPL-SCNC: 142 MMOL/L (ref 133–144)
URATE SERPL-MCNC: 8 MG/DL (ref 3.5–7.2)
WBC # BLD AUTO: 9.3 10E9/L (ref 4–11)

## 2018-06-04 PROCEDURE — 83880 ASSAY OF NATRIURETIC PEPTIDE: CPT | Performed by: STUDENT IN AN ORGANIZED HEALTH CARE EDUCATION/TRAINING PROGRAM

## 2018-06-04 PROCEDURE — 80053 COMPREHEN METABOLIC PANEL: CPT | Performed by: STUDENT IN AN ORGANIZED HEALTH CARE EDUCATION/TRAINING PROGRAM

## 2018-06-04 PROCEDURE — 25000132 ZZH RX MED GY IP 250 OP 250 PS 637: Performed by: STUDENT IN AN ORGANIZED HEALTH CARE EDUCATION/TRAINING PROGRAM

## 2018-06-04 PROCEDURE — 84550 ASSAY OF BLOOD/URIC ACID: CPT | Performed by: STUDENT IN AN ORGANIZED HEALTH CARE EDUCATION/TRAINING PROGRAM

## 2018-06-04 PROCEDURE — 36415 COLL VENOUS BLD VENIPUNCTURE: CPT | Performed by: STUDENT IN AN ORGANIZED HEALTH CARE EDUCATION/TRAINING PROGRAM

## 2018-06-04 PROCEDURE — 40000141 ZZH STATISTIC PERIPHERAL IV START W/O US GUIDANCE

## 2018-06-04 PROCEDURE — 83735 ASSAY OF MAGNESIUM: CPT | Performed by: STUDENT IN AN ORGANIZED HEALTH CARE EDUCATION/TRAINING PROGRAM

## 2018-06-04 PROCEDURE — 93010 ELECTROCARDIOGRAM REPORT: CPT | Performed by: INTERNAL MEDICINE

## 2018-06-04 PROCEDURE — 21400006 ZZH R&B CCU INTERMEDIATE UMMC

## 2018-06-04 PROCEDURE — 25000128 H RX IP 250 OP 636: Performed by: STUDENT IN AN ORGANIZED HEALTH CARE EDUCATION/TRAINING PROGRAM

## 2018-06-04 PROCEDURE — 85025 COMPLETE CBC W/AUTO DIFF WBC: CPT | Performed by: STUDENT IN AN ORGANIZED HEALTH CARE EDUCATION/TRAINING PROGRAM

## 2018-06-04 PROCEDURE — 85610 PROTHROMBIN TIME: CPT | Performed by: STUDENT IN AN ORGANIZED HEALTH CARE EDUCATION/TRAINING PROGRAM

## 2018-06-04 PROCEDURE — 25000132 ZZH RX MED GY IP 250 OP 250 PS 637

## 2018-06-04 PROCEDURE — 00000146 ZZHCL STATISTIC GLUCOSE BY METER IP

## 2018-06-04 PROCEDURE — 99223 1ST HOSP IP/OBS HIGH 75: CPT | Mod: 25 | Performed by: INTERNAL MEDICINE

## 2018-06-04 PROCEDURE — 93005 ELECTROCARDIOGRAM TRACING: CPT

## 2018-06-04 RX ORDER — HEPARIN SODIUM 5000 [USP'U]/.5ML
5000 INJECTION, SOLUTION INTRAVENOUS; SUBCUTANEOUS EVERY 12 HOURS
Status: DISCONTINUED | OUTPATIENT
Start: 2018-06-04 | End: 2018-06-04

## 2018-06-04 RX ORDER — FUROSEMIDE 10 MG/ML
80 INJECTION INTRAMUSCULAR; INTRAVENOUS ONCE
Status: COMPLETED | OUTPATIENT
Start: 2018-06-04 | End: 2018-06-04

## 2018-06-04 RX ORDER — LISINOPRIL 5 MG/1
5 TABLET ORAL 2 TIMES DAILY
Status: DISCONTINUED | OUTPATIENT
Start: 2018-06-04 | End: 2018-06-07

## 2018-06-04 RX ORDER — ATORVASTATIN CALCIUM 40 MG/1
40 TABLET, FILM COATED ORAL EVERY EVENING
Status: DISCONTINUED | OUTPATIENT
Start: 2018-06-04 | End: 2018-06-08 | Stop reason: HOSPADM

## 2018-06-04 RX ORDER — ASPIRIN 81 MG/1
81 TABLET ORAL EVERY EVENING
Status: DISCONTINUED | OUTPATIENT
Start: 2018-06-04 | End: 2018-06-08 | Stop reason: HOSPADM

## 2018-06-04 RX ORDER — NICOTINE POLACRILEX 4 MG
15-30 LOZENGE BUCCAL
Status: DISCONTINUED | OUTPATIENT
Start: 2018-06-04 | End: 2018-06-08 | Stop reason: HOSPADM

## 2018-06-04 RX ORDER — OMEGA-3 FATTY ACIDS/FISH OIL 300-1000MG
1 CAPSULE ORAL DAILY
Status: DISCONTINUED | OUTPATIENT
Start: 2018-06-04 | End: 2018-06-04

## 2018-06-04 RX ORDER — MAGNESIUM SULFATE HEPTAHYDRATE 40 MG/ML
4 INJECTION, SOLUTION INTRAVENOUS EVERY 4 HOURS PRN
Status: DISCONTINUED | OUTPATIENT
Start: 2018-06-04 | End: 2018-06-08 | Stop reason: HOSPADM

## 2018-06-04 RX ORDER — POTASSIUM CHLORIDE 750 MG/1
20 TABLET, EXTENDED RELEASE ORAL 2 TIMES DAILY
Status: DISCONTINUED | OUTPATIENT
Start: 2018-06-04 | End: 2018-06-05

## 2018-06-04 RX ORDER — TAMSULOSIN HYDROCHLORIDE 0.4 MG/1
0.4 CAPSULE ORAL DAILY
Status: DISCONTINUED | OUTPATIENT
Start: 2018-06-05 | End: 2018-06-08 | Stop reason: HOSPADM

## 2018-06-04 RX ORDER — POTASSIUM CHLORIDE 750 MG/1
20-40 TABLET, EXTENDED RELEASE ORAL
Status: DISCONTINUED | OUTPATIENT
Start: 2018-06-04 | End: 2018-06-05

## 2018-06-04 RX ORDER — POLYETHYLENE GLYCOL 3350 17 G/17G
17 POWDER, FOR SOLUTION ORAL DAILY PRN
Status: DISCONTINUED | OUTPATIENT
Start: 2018-06-04 | End: 2018-06-08 | Stop reason: HOSPADM

## 2018-06-04 RX ORDER — CHLORAL HYDRATE 500 MG
1 CAPSULE ORAL DAILY
Status: DISCONTINUED | OUTPATIENT
Start: 2018-06-05 | End: 2018-06-08 | Stop reason: HOSPADM

## 2018-06-04 RX ORDER — DEXTROSE MONOHYDRATE 25 G/50ML
25-50 INJECTION, SOLUTION INTRAVENOUS
Status: DISCONTINUED | OUTPATIENT
Start: 2018-06-04 | End: 2018-06-08 | Stop reason: HOSPADM

## 2018-06-04 RX ORDER — SPIRONOLACTONE 25 MG/1
25 TABLET ORAL EVERY EVENING
Status: DISCONTINUED | OUTPATIENT
Start: 2018-06-04 | End: 2018-06-06

## 2018-06-04 RX ORDER — AMOXICILLIN 250 MG
1-2 CAPSULE ORAL 2 TIMES DAILY PRN
Status: DISCONTINUED | OUTPATIENT
Start: 2018-06-04 | End: 2018-06-08 | Stop reason: HOSPADM

## 2018-06-04 RX ORDER — METOPROLOL SUCCINATE 100 MG/1
100 TABLET, EXTENDED RELEASE ORAL EVERY EVENING
Status: DISCONTINUED | OUTPATIENT
Start: 2018-06-04 | End: 2018-06-08 | Stop reason: HOSPADM

## 2018-06-04 RX ORDER — FUROSEMIDE 10 MG/ML
80 INJECTION INTRAMUSCULAR; INTRAVENOUS EVERY 8 HOURS
Status: DISCONTINUED | OUTPATIENT
Start: 2018-06-05 | End: 2018-06-06

## 2018-06-04 RX ORDER — POTASSIUM CHLORIDE 1.5 G/1.58G
20-40 POWDER, FOR SOLUTION ORAL
Status: DISCONTINUED | OUTPATIENT
Start: 2018-06-04 | End: 2018-06-05

## 2018-06-04 RX ORDER — HEPARIN SODIUM 5000 [USP'U]/.5ML
5000 INJECTION, SOLUTION INTRAVENOUS; SUBCUTANEOUS EVERY 8 HOURS
Status: DISCONTINUED | OUTPATIENT
Start: 2018-06-04 | End: 2018-06-04

## 2018-06-04 RX ORDER — POTASSIUM CL/LIDO/0.9 % NACL 10MEQ/0.1L
10 INTRAVENOUS SOLUTION, PIGGYBACK (ML) INTRAVENOUS
Status: DISCONTINUED | OUTPATIENT
Start: 2018-06-04 | End: 2018-06-05

## 2018-06-04 RX ORDER — MULTIPLE VITAMINS W/ MINERALS TAB 9MG-400MCG
1 TAB ORAL DAILY
Status: DISCONTINUED | OUTPATIENT
Start: 2018-06-05 | End: 2018-06-08 | Stop reason: HOSPADM

## 2018-06-04 RX ORDER — BUMETANIDE 0.25 MG/ML
2 INJECTION INTRAMUSCULAR; INTRAVENOUS ONCE
Status: DISCONTINUED | OUTPATIENT
Start: 2018-06-04 | End: 2018-06-04

## 2018-06-04 RX ORDER — POTASSIUM CHLORIDE 7.45 MG/ML
10 INJECTION INTRAVENOUS
Status: DISCONTINUED | OUTPATIENT
Start: 2018-06-04 | End: 2018-06-05

## 2018-06-04 RX ORDER — POTASSIUM CHLORIDE 29.8 MG/ML
20 INJECTION INTRAVENOUS
Status: DISCONTINUED | OUTPATIENT
Start: 2018-06-04 | End: 2018-06-05

## 2018-06-04 RX ADMIN — ASPIRIN 81 MG: 81 TABLET, COATED ORAL at 21:03

## 2018-06-04 RX ADMIN — WARFARIN SODIUM 12.5 MG: 10 TABLET ORAL at 21:03

## 2018-06-04 RX ADMIN — LISINOPRIL 5 MG: 5 TABLET ORAL at 21:03

## 2018-06-04 RX ADMIN — SPIRONOLACTONE 25 MG: 25 TABLET ORAL at 21:03

## 2018-06-04 RX ADMIN — METOPROLOL SUCCINATE 100 MG: 100 TABLET, EXTENDED RELEASE ORAL at 21:03

## 2018-06-04 RX ADMIN — ATORVASTATIN CALCIUM 40 MG: 40 TABLET ORAL at 21:03

## 2018-06-04 RX ADMIN — POTASSIUM CHLORIDE 20 MEQ: 750 TABLET, EXTENDED RELEASE ORAL at 21:03

## 2018-06-04 RX ADMIN — LEVOTHYROXINE SODIUM 175 MCG: 25 TABLET ORAL at 21:03

## 2018-06-04 RX ADMIN — FUROSEMIDE 80 MG: 10 INJECTION, SOLUTION INTRAVENOUS at 21:02

## 2018-06-04 ASSESSMENT — ACTIVITIES OF DAILY LIVING (ADL)
COGNITION: 0 - NO COGNITION ISSUES REPORTED
RETIRED_COMMUNICATION: 0-->UNDERSTANDS/COMMUNICATES WITHOUT DIFFICULTY
EATING: 0 - INDEPENDENT
RETIRED_EATING: 0-->INDEPENDENT
TOILETING: 1 - ASSISTIVE EQUIPMENT
SWALLOWING: 0-->SWALLOWS FOODS/LIQUIDS WITHOUT DIFFICULTY
BATHING: 1-->ASSISTIVE EQUIPMENT
TOILETING: 1-->ASSISTIVE EQUIPMENT
WHICH_OF_THE_ABOVE_FUNCTIONAL_RISKS_HAD_A_RECENT_ONSET_OR_CHANGE?: TRANSFERRING
DRESS: 1 - ASSISTIVE EQUIPMENT
TRANSFERRING: 1 - ASSISTIVE EQUIPMENT
COMMUNICATION: 0 - UNDERSTANDS/COMMUNICATES WITHOUT DIFFICULTY
AMBULATION: 0 - INDEPENDENT
DRESS: 1-->ASSISTIVE EQUIPMENT
BATHING: 1 - ASSISTIVE EQUIPMENT
FALL_HISTORY_WITHIN_LAST_SIX_MONTHS: YES
TRANSFERRING: 1-->ASSISTIVE EQUIPMENT
AMBULATION: 0-->INDEPENDENT
NUMBER_OF_TIMES_PATIENT_HAS_FALLEN_WITHIN_LAST_SIX_MONTHS: 2
SWALLOWING: 0 - SWALLOWS FOODS/LIQUIDS WITHOUT DIFFICULTY

## 2018-06-04 NOTE — IP AVS SNAPSHOT
MRN:[id number]                      After Visit Summary   6/4/2018    Clarke Moreira    MRN: [id number]           Thank you!     Thank you for choosing [facility] for your care. Our goal is always to provide you with excellent care. Hearing back from our patients is one way we can continue to improve our services. Please take a few minutes to complete the written survey that you may receive in the mail after you visit with us. Thank you!        Patient Information     Date Of Birth          1950        Designated Caregiver       Most Recent Value    Caregiver    Will someone help with your care after discharge? yes    Name of designated caregiver [facility]    Phone number of caregiver available    Caregiver address available      About your hospital stay     You were admitted on:  June 4, 2018 You last received care in the:  [address] [facility]    You were discharged on:  June 8, 2018        Reason for your hospital stay       Heart failure exacerbation                  Who to Call     For medical emergencies, please call 911.  For non-urgent questions about your medical care, please call your primary care provider or clinic, [phone]          Attending Provider     Provider Specialty    Armin Coronel MD Cardiology    [facility]       Primary Care Provider Office Phone # Fax #    Matthias Sanchez [phone]      After Care Instructions     Activity       Your activity upon discharge: activity as tolerated            Diet       Follow this diet upon discharge: Orders Placed This Encounter      Fluid restriction 2000 ML FLUID      Low Saturated Fat Na <2400 mg            Discharge Instructions       Please stop taking the Lasix and take the Torsemide instead. If you notice increasing weight gain, please let your primary care doctor know so that they can change your diuretic regimen. You should follow up at the CORE clinic in 2-4  weeks from now to make sure you are doing okay. Also follow up with your primary care doctor in the next week or so.     Be sure to have your home care RN draw your INR to make sure it is heading in the right direction            Oxygen Adult       Renew Home Oxygen Order  Renew previous prescription.  Expected treatment length is indefinite (99 months).    Attending Provider: Karla Bey MD  Physician signature: See electronic signature associated with these discharge orders  Date of Order: June 7, 2018                  Follow-up Appointments     Adult [email or web address] Follow-up and recommended labs and tests       Follow up with primary care provider, Matthias Sanchez, within 7 days to evaluate medication change.  The following labs/tests are recommended: BMP.    Follow     Appointments on [place] and/or [facility] with [facility] clinic at [facility], within 2-4 weeks to evaluate medication change. The following labs/tests are recommended: [facility] (with [email or web address] or [facility] provider or service). Call [phone] if you haven't heard regarding these appointments within 7 days of discharge.                  Your next 10 appointments already scheduled     Jun 12, 2018 11:00 AM CDT   (Arrive by 10:45 AM)   NEW GENERAL with Stevo Mathew OD   [facility] ([facility] [place])    [address]  4th [address]  [place] [address] [phone]            Rodney 15, 2018 12:30 PM CDT   Lab with  LAB   [facility] ([facility])    [address]  [address]st [address]  [place] [address] [phone]            Rodney 15, 2018  1:00 PM CDT   (Arrive by 12:45 PM)   CORE RETURN with RE[facility] ([facility] [place])    [address]  [address]  [place] [address]   [phone]            Jul 06, 2018 10:40 AM CDT   (Arrive by 10:25 AM)   RETURN FOOT/ANKLE with Jesús Lagos DPM   [facility]  Orthopaedic Clinic ([facility] [place])    [address]  4th [facility] [id number] [phone]              Additional Services     Home care nursing referral       [facility]   Phone: [phone]   Fax: [phone]    For RN eval post hospitalization.   Resume previous orders.  Assess: vital signs, respiratory and cardiac status, activity tolerance, hydration, nutritional status.  Med set up and management.   Heart failure education reinforcement.   INR lab draws, next due on 6/8/2018.  PT/OT eval and treat for deconditioning, strengthening, and endurance.   Resume lymphedema therapy.         Your provider has ordered home care nursing services. If you have not been contacted within 2 days of your discharge please call the inpatient department phone number at [phone] .            Home care nursing referral       [facility]    RN skilled nursing visit. RN to assess vital signs and weight. Also help with     Your provider has ordered home care nursing services. If you have not been contacted within 2 days of your discharge please call the inpatient department phone number at [phone] .                  General Recommendations To Control Heart Failure When You Get Home       Heart Failure Instructions for Patients and Families: Please read and check off each of these important instructions as you do them when you get home.          Weight and Symptoms       ___ Put a scale in your bathroom.    ___ Post a weight chart or calendar next to your scale.    ___ Weigh yourself everyday as soon as you get up in the morning (before breakfast).  You should only be wearing your pajamas.  Write your weight on the chart/calendar.  ___ Bring your weight chart/calendar with you to all appointments.  ___ Call your doctor or nurse practitioner if you gain 2 pounds (in 1 day) or 5 pounds in (1 week) from your goal  good  weight.  Your good weight is also  called your  dry  weight.  Your doctor or nurse will tell you what your good weight should be.    ___ Call your doctor or nurse practitioner if you have shortness of breath that gets worse over time, leg swelling or fatigue.       Medications and Diet       ___ Make sure to take your medication as prescribed.    ___ Bring a current list of your medication and all of your medicine bottles with you to all appointments.    ___ Limit fluids if you still have swelling or shortness of breath, or if your doctor tells you to do so.    ___ Eat less than 2000 mg of sodium (salt) every day. Read food labels, and do not add salt to meals. Remember, if you eat less salt you retain less fluid.  ___ Follow a heart healthy diet that is low in saturated fat.        Activity and Suggested Lifestyle Changes      ___ Stay active. Talk to your doctor about an exercise program that is safe for your heart.  ___ Stop smoking. Reduce alcohol use.      ___ Lose weight if you are overweight. Extra weight puts a lot of stress on the heart.                 Control for Leg Swelling     ___ Keep your legs elevated (raised) as needed for swelling. If swelling is uncomfortable or elevation doesn t help, ask your doctor about using ACE wraps or support stockings.        What is the [facility].O.R.E. Clinic?  Cardiomyopathy  Optimization  Rehabilitation  Education    The [facility].R[facility] Clinic is a heart failure specialty clinic within [facility].  It is an outpatient disease management program that is based on a phase-by-phase approach, which is tailored to each patient s individual needs.  The cardiologist, nurse practitioner, physician assistant and nurses provide an ongoing outpatient care and treatment plan that guides heart failure and cardiomyopathy patients from evaluation and education to stabilization. This team works with your current primary care doctor and cardiologist to help you:    - Avoid hospitalizations  - Slow the progression of the  disease  - Improve length and quality of life  - Know who and when to call if heart failure symptoms appear  - Receive easy access to quality health care and advice  - Better understand your condition and treatment  - Decrease the tremendous cost burden of heart failure care  - Detect future heart problems before they become life threatening                                 Follow-up Appointments     ___ An appointment with the [facility].R.E. Clinic may already have been made for you (see section   Your next appointments already scheduled ).  If you have a question about your appointment, would like to speak with a C.O.R.E. nurse, or would like to become a [facility].O.R.E. Clinic patient, please call one of the following locations:     - [facility]):                                             [phone]  - [facility]):                                            [phone]  - [facility] ([place]):                 [phone]      ___ Your C.O.R.E. Clinic Team will continue to educate you on your heart failure and may adjust medications based on your vital signs, lab work, and how you are feeling.  Therefore, it is very important to bring the following to all C.O.R.E. appointments:    - An accurate list of your medications  - Your medicine bottles  - Your weight chart/calendar                   Pending Results     No orders found from 6/2/2018 to 6/5/2018.            Statement of Approval     Ordered          06/08/18 1243  I have reviewed and agree with all the recommendations and orders detailed in this document.  EFFECTIVE NOW     Approved and electronically signed by:  David Wood MD           06/07/18 0505  I have reviewed and agree with all the recommendations and orders detailed in this document.  EFFECTIVE NOW     Approved and electronically signed by:  David Wood MD             Admission Information     Date & Time  "Provider Department Dept. Phone    2018 Karla Bey MD [address] [facility] [phone]      Your Vitals Were     Blood Pressure Pulse Temperature Respirations Height Weight    109/73 (BP Location: Left arm) 83 97.5  F (36.4  C) (Oral) 18 1.829 m (6') 167.8 kg (369 lb 14.9 oz)    Pulse Oximetry BMI (Body Mass Index)                98% 50.17 kg/m2          Unified Inbox Information     [employer] lets you send messages to your doctor, view your test results, renew your prescriptions, schedule appointments and more. To sign up, go to [email or web address] . Click on \"Log in\" on the left side of the screen, which will take you to the Welcome page. Then click on \"Sign up Now\" on the right side of the page.     You will be asked to enter the access code listed below, as well as some personal information. Please follow the directions to create your username and password.     Your access code is: [email or web address]  Expires: 2018  6:30 AM     Your access code will  in 90 days. If you need help or a new code, please call your [facility] clinic or [phone].        Care EveryWhere ID     This is your Care EveryWhere ID. This could be used by other organizations to access your [facility] medical records  [id number]        Equal Access to Services     TYLER RICO AH: Hadii kell Cotton, waaxda luqadaha, qaybta kaalmada veronika, vasyl casarez . So [facility] [phone].    ATENCIÓN: Si habla español, tiene a kim disposición servicios gratuitos de asistencia lingüística. Alexis al [phone].    We comply with applicable federal civil rights laws and [place] laws. We do not discriminate on the basis of race, color, national origin, age, disability, sex, sexual orientation, or gender identity.               Review of your medicines      START taking        Dose / Directions    torsemide 100 MG tablet   Commonly known as:  DEMADEX        Dose:  50 mg   Take 0.5 tablets (50 mg) by " mouth 2 times daily   Quantity:  60 tablet   Refills:  1         CONTINUE these medicines which have NOT CHANGED        Dose / Directions    aspirin 81 MG tablet   Used for:  Essential hypertension        Dose:  81 mg   Take 1 tablet (81 mg) by mouth daily   Quantity:  30 tablet   Refills:  0       atorvastatin 40 MG tablet   Commonly known as:  LIPITOR   Used for:  Type 2 diabetes mellitus without complication, without long-term current use of insulin (H)        Dose:  40 mg   Take 1 tablet (40 mg) by mouth daily   Quantity:  30 tablet   Refills:  11       blood glucose monitoring lancets   Used for:  Diabetes mellitus, type 2 (H)        Use to test blood sugars 2 times daily or as directed.   Quantity:  100 each   Refills:  11       blood glucose monitoring meter device kit   Used for:  Type 2 diabetes mellitus with hyperglycemia, without long-term current use of insulin (H)        Use to test blood sugars 2 times daily or as directed.   Quantity:  1 kit   Refills:  0       blood glucose monitoring test strip   Commonly known as:  ONETOUCH ULTRA   Used for:  Diabetes mellitus, type 2 (H)        Use to test blood sugars 2 times daily or as directed.   Quantity:  100 strip   Refills:  11       levothyroxine 175 MCG tablet   Commonly known as:  SYNTHROID/LEVOTHROID   Used for:  Hypothyroidism, unspecified type        Dose:  175 mcg   Take 1 tablet (175 mcg) by mouth daily   Quantity:  30 tablet   Refills:  11       lisinopril 5 MG tablet   Commonly known as:  PRINIVIL/ZESTRIL   Used for:  Chronic systolic congestive heart failure (H)        Dose:  5 mg   Take 1 tablet (5 mg) by mouth 2 times daily   Quantity:  60 tablet   Refills:  3       metFORMIN 500 MG tablet   Commonly known as:  GLUCOPHAGE   Used for:  Type 2 diabetes mellitus without complication, without long-term current use of insulin (H)        Dose:  500 mg   Take 1 tablet (500 mg) by mouth 2 times daily (with meals)   Quantity:  60 tablet   Refills:  11        metoprolol succinate 100 MG 24 hr tablet   Commonly known as:  TOPROL-XL   Used for:  Essential hypertension with goal blood pressure less than 140/90        Dose:  100 mg   Take 1 tablet (100 mg) by mouth daily   Quantity:  30 tablet   Refills:  11       multivitamin, therapeutic with minerals Tabs tablet   Used for:  Type 2 diabetes mellitus without complication, without long-term current use of insulin (H)        Dose:  1 tablet   Take 1 tablet by mouth daily   Quantity:  30 each   Refills:  11       omega 3 1000 MG Caps   Used for:  Hyperlipidemia LDL goal <100        Dose:  1 g   Take 1 g by mouth daily   Quantity:  30 capsule   Refills:  11       * order for DME   Used for:  Lymphedema of extremity        Equipment being ordered: Other: Velcro Compression stockings.  Treatment Diagnosis: bilateral lymphedema.   Quantity:  2 each   Refills:  0       * order for DME   Used for:  Chronic systolic congestive heart failure (H), Lymphedema, Morbid obesity (H)        Equipment being ordered: Bariatric Lift chair Diagnosis - morbid obesity, CHF, Lymphedema  Fax to Ne from [facility] at [phone].   Quantity:  1 Units   Refills:  0       polyethylene glycol Packet   Commonly known as:  MIRALAX/GLYCOLAX   Used for:  Constipation, unspecified constipation type        Dose:  17 g   Take 17 g by mouth daily as needed for constipation   Quantity:  15 packet   Refills:  11       potassium chloride SA 20 MEQ CR tablet   Commonly known as:  K-DUR/KLOR-CON M   Indication:  hypokalemia   Used for:  Hypokalemia        Take 40 meq in the morning and 20 meq in the afternoons.   Quantity:  60 tablet   Refills:  11       senna-docusate 8.6-50 MG per tablet   Commonly known as:  SENOKOT-S;PERICOLACE   Used for:  Constipation, unspecified constipation type        Dose:  1-2 tablet   Take 1-2 tablets by mouth 2 times daily as needed for constipation   Quantity:  60 tablet   Refills:  11       spironolactone 25 MG tablet    Commonly known as:  ALDACTONE   Used for:  Chronic systolic congestive heart failure (H)        Dose:  25 mg   Take 1 tablet (25 mg) by mouth daily   Quantity:  30 tablet   Refills:  3       tamsulosin 0.4 MG capsule   Commonly known as:  FLOMAX   Used for:  Urinary retention        Dose:  0.4 mg   Take 1 capsule (0.4 mg) by mouth daily   Quantity:  30 capsule   Refills:  11       vitamin B complex with vitamin C Tabs tablet        Dose:  1 tablet   Take 1 tablet by mouth daily   Refills:  0       VITAMIN C PO        Refills:  0       VITAMIN E COMPLEX PO        Refills:  0       warfarin 5 MG tablet   Commonly known as:  COUMADIN   Used for:  Persistent atrial fibrillation (H)        Dose:  12.5 mg   Take 2.5 tablets (12.5 mg) by mouth daily   Quantity:  75 tablet   Refills:  11       * Notice:  This list has 2 medication(s) that are the same as other medications prescribed for you. Read the directions carefully, and ask your doctor or other care provider to review them with you.      STOP taking     furosemide 20 MG tablet   Commonly known as:  LASIX                Where to get your medicines      These medications were sent to [facility] [address] [address]     Phone:  [phone]     torsemide 100 MG tablet                Protect others around you: Learn how to safely use, store and throw away your medicines at www.disposemymeds.org.             Medication List: This is a list of all your medications and when to take them. Check marks below indicate your daily home schedule. Keep this list as a reference.      Medications           Morning Afternoon Evening Bedtime As Needed    aspirin 81 MG tablet   Take 1 tablet (81 mg) by mouth daily                                atorvastatin 40 MG tablet   Commonly known as:  LIPITOR   Take 1 tablet (40 mg) by mouth daily   Last time this was given:  40 mg on 6/7/2018  9:08 PM                                 blood glucose monitoring lancets   Use to test blood sugars 2 times daily or as directed.                                blood glucose monitoring meter device kit   Use to test blood sugars 2 times daily or as directed.                                blood glucose monitoring test strip   Commonly known as:  ONETOUCH ULTRA   Use to test blood sugars 2 times daily or as directed.                                levothyroxine 175 MCG tablet   Commonly known as:  SYNTHROID/LEVOTHROID   Take 1 tablet (175 mcg) by mouth daily   Last time this was given:  175 mcg on 6/7/2018  9:08 PM                                lisinopril 5 MG tablet   Commonly known as:  PRINIVIL/ZESTRIL   Take 1 tablet (5 mg) by mouth 2 times daily   Last time this was given:  5 mg on 6/7/2018  8:45 AM                                metFORMIN 500 MG tablet   Commonly known as:  GLUCOPHAGE   Take 1 tablet (500 mg) by mouth 2 times daily (with meals)                                metoprolol succinate 100 MG 24 hr tablet   Commonly known as:  TOPROL-XL   Take 1 tablet (100 mg) by mouth daily   Last time this was given:  100 mg on 6/7/2018  9:08 PM                                multivitamin, therapeutic with minerals Tabs tablet   Take 1 tablet by mouth daily   Last time this was given:  1 tablet on 6/8/2018  7:48 AM                                omega 3 1000 MG Caps   Take 1 g by mouth daily   Last time this was given:  1 g on 6/8/2018  7:48 AM                                * order for DME   Equipment being ordered: Other: Velcro Compression stockings.  Treatment Diagnosis: bilateral lymphedema.                                * order for DME   Equipment being ordered: Bariatric Lift chair Diagnosis - morbid obesity, CHF, Lymphedema  Fax to Ne from [facility] at [phone].                                polyethylene glycol Packet   Commonly known as:  MIRALAX/GLYCOLAX   Take 17 g by mouth daily as needed for constipation   Last  time this was given:  17 g on 6/7/2018 12:08 PM                                potassium chloride SA 20 MEQ CR tablet   Commonly known as:  K-DUR/KLOR-CON M   Take 40 meq in the morning and 20 meq in the afternoons.   Last time this was given:  20 mEq on 6/8/2018 10:47 AM                                senna-docusate 8.6-50 MG per tablet   Commonly known as:  SENOKOT-S;PERICOLACE   Take 1-2 tablets by mouth 2 times daily as needed for constipation                                spironolactone 25 MG tablet   Commonly known as:  ALDACTONE   Take 1 tablet (25 mg) by mouth daily   Last time this was given:  50 mg on 6/7/2018  9:08 PM                                tamsulosin 0.4 MG capsule   Commonly known as:  FLOMAX   Take 1 capsule (0.4 mg) by mouth daily   Last time this was given:  0.4 mg on 6/8/2018  7:48 AM                                torsemide 100 MG tablet   Commonly known as:  DEMADEX   Take 0.5 tablets (50 mg) by mouth 2 times daily   Last time this was given:  75 mg on 6/7/2018  8:46 AM                                vitamin B complex with vitamin C Tabs tablet   Take 1 tablet by mouth daily                                VITAMIN C PO                                VITAMIN E COMPLEX PO                                warfarin 5 MG tablet   Commonly known as:  COUMADIN   Take 2.5 tablets (12.5 mg) by mouth daily   Last time this was given:  12.5 mg on 6/7/2018  6:15 PM                                * Notice:  This list has 2 medication(s) that are the same as other medications prescribed for you. Read the directions carefully, and ask your doctor or other care provider to review them with you.

## 2018-06-04 NOTE — IP AVS SNAPSHOT
` `     [address] [facility] BANK: [phone]            Medication Administration Report for Clarke Moreira as of 06/08/18 1736   Legend:    Given Hold Not Given Due Canceled Entry Other Actions    Time Time (Time) Time  Time-Action       Inactive    Active    Linked        Medications 06/02/18 06/03/18 06/04/18 06/05/18 06/06/18 06/07/18 06/08/18    aspirin EC tablet 81 mg  Dose: 81 mg  Freq: EVERY EVENING Route: PO  Start: 06/04/18 2000   Admin. Amount: 1 tablet (1 × 81 mg tablet)  Last Admin: 06/07/18 2107  Dispense Loc: [facility] ADS 6C1       2103 (81 mg)-Given        1925 (81 mg)-Given        2026 (81 mg)-Given        2107 (81 mg)-Given        [ ] 2000           atorvastatin (LIPITOR) tablet 40 mg  Dose: 40 mg  Freq: EVERY EVENING Route: PO  Start: 06/04/18 2000   Admin. Amount: 1 tablet (1 × 40 mg tablet)  Last Admin: 06/07/18 2108  Dispense Loc: [facility] ADS 6C1       2103 (40 mg)-Given        1925 (40 mg)-Given        2026 (40 mg)-Given        2108 (40 mg)-Given        [ ] 2000           Continuing ACE inhibitor/ARB/ARNI from home medication list OR ACE inhibitor/ARB/ARNI order already placed during this visit  Freq: DOES NOT GO TO MAR Route: XX  PRN Reason: other  Start: 06/04/18 1832   Admin Instructions: Continuing ACE inhibitor/ARB/ARNI from home medication list OR ACE inhibitor/ARB/ARNI order already placed during this visit    Dispense Loc: [facility]               Continuing beta blocker from home medication list OR beta blocker order already placed during this visit  Freq: DOES NOT GO TO MAR Route: XX  PRN Reason: other  Start: 06/04/18 1832   Admin Instructions: Continuing beta blocker from home medication list OR beta blocker order already placed during this visit    Dispense Loc: [facility]               fish oil-omega-3 fatty acids capsule 1 g  Dose: 1 g  Freq: DAILY Route: PO  Start: 06/05/18 0800   Admin. Amount: 1 capsule (1 × 1 g capsule)  Last Admin: 06/08/18  0748  Dispense Loc: [facility] ADS 6C1        1027 (1 g)-Given        0843 (1 g)-Given        0845 (1 g)-Given        0748 (1 g)-Given           glucose gel 15-30 g  Dose: 15-30 g  Freq: EVERY 15 MIN PRN Route: PO  PRN Reason: low blood sugar  Start: 06/04/18 2043   Admin Instructions: Give 15 g for BG 51 to 69 mg/dL IF patient is conscious and able to swallow. Give 30 g for BG less than or equal to 50 mg/dL IF patient is conscious and able to swallow. Do NOT give glucose gel via enteral tube.  IF patient has enteral tube: give apple juice 120 mL (4 oz or 15 g of CHO) via enteral tube for BG 51 to 69 mg/dL.  Give apple juice 240 mL (8 oz or 30 g of CHO) via enteral tube for BG less than or equal to 50 mg/dL.    ~Oral gel is preferable for conscious and able to swallow patient.   ~IF gel unavailable or patient refuses may provide apple juice 120 mL (4 oz or 15 g of CHO). Document juice on I and O flowsheet.    Admin. Amount: 15-30 g  Dispense Loc: [facility] ADS 6C1  Volume: 93.75 mL              Or  dextrose 50 % injection 25-50 mL  Dose: 25-50 mL  Freq: EVERY 15 MIN PRN Route: IV  PRN Reason: low blood sugar  Start: 06/04/18 2043   Admin Instructions: Use if have IV access, BG less than 70 mg/dL and meet dose criteria below:  Dose if conscious and alert (or disorientated) and NPO = 25 mL  Dose if unconscious / not alert = 50 mL  Vesicant. For ordered doses up to 25 g, give IV Push undiluted. Give each 5g over 1 minute.    Admin. Amount: 25-50 mL  Dispense Loc: [facility] ADS 6C1  Infused Over: 1-5 Minutes  Volume: 50 mL              Or  glucagon injection 1 mg  Dose: 1 mg  Freq: EVERY 15 MIN PRN Route: SC  PRN Reason: low blood sugar  PRN Comment: May repeat x 1 only  Start: 06/04/18 2043   Admin Instructions: May give SQ or IM. ONLY use glucagon IF patient has NO IV access AND is UNABLE to swallow AND blood glucose is LESS than or EQUAL to 50 mg/dL.  If ordered IV, give IV Push over 1 minute. Reconstitute with 1mL sterile  water.    Admin. Amount: 1 mg  Dispense Loc: [facility] ADS 6C1               HOLD nitroGLYcerin IF  Freq: HOLD Route: XX  Start: 06/04/18 1825   Admin Instructions: HOLD nitroGLYcerin IF patient has received avanafil (STENDRA), sildenafil (VIAGRA/REVATIO) within the last 8 hours, vardenafil (LEVITRA/STAXYN) within the last 18 hours or tadalafil (CIALIS/ADCIRCA) within the last 36 hours.    Order specific questions:  Medication(s) to hold: Nitroglycerin IF   Parameter for hold (doses,days,conditions) : Other (see admin instructions)  Hours or days to hold med before/after procedure/surgery 36     Dispense Loc: [facility]               HOLD:  Metformin and metformin containing medications if patient received IV contrast with acute kidney injury or severe chronic kidney disease (stage IV or stage V; i.e., eGFR less than 30)  Freq: HOLD Route: XX  Start: 06/06/18 1738   Admin Instructions: Hold metformin (GLUCOPHAGE, GLUMETZA, FORTAMET, RIOMET) and metformin containing medications: alogliptin/metformin (KAZANO), glipizide/metformin (METAGLIP), glyburide/metformin (GLUCOVANCE), rosiglitazone/metformin (AVANDAMET), dapagliflozin/metformin (XIGDUO XR), sitagliptin/metformin (JANUMET, JANUMET XR), linagliptin/metformin (JENTADUETO), repaglinide/metformin (PRANDIMET), saxagliptin/metformin (KOMBIGLYZE XR), canagliflozin/metformin (INVOKAMET), and pioglitazone/metformin (ACTOPLUS MET, ACTOPLUS MET XR) on day of the procedure and for 48 hours after IV contrast given. If patient was on one of these medications pre-procedure,  continue to HOLD for 48 hours post-procedure.    Order specific questions:  Medication(s) to hold: Hold metformin and metformin containing medications: for patients with acute kidney injury or severe chronic kidney disease (stage IV or stage V; i.e., eGFR less than 30)  Parameter for hold (doses,days,conditions) : Other (see admin instructions)  Hours or days to hold med before/after  procedure/surgery Day of the procedure and for 48 hours after IV contrast given     Dispense Loc: [facility]  POC: Cardiac Post-procedure               insulin aspart (NovoLOG) inj (RAPID ACTING)  Dose: 1-3 Units  Freq: AT BEDTIME Route: SC  Start: 06/04/18 2200   Admin Instructions: LOW INSULIN RESISTANCE DOSING    Do Not give Bedtime Correction Insulin if BG less than 200.   For  - 299 give 1 unit.   For  - 399 give 2 units   For BG greater than or equal 400 give 3 units.   Notify provider if glucose greater than or equal to 350 mg/dL after administration of correction dose.  If given at mealtime, must be administered 5 min before meal or immediately after.    Admin. Amount: 1-3 Units  Dispense Loc: Contact Rx for dose  Volume: 3 mL       (2104)-Not Given        (2156)-Not Given        (2146)-Not Given        (2210)-Not Given [C]        [ ] 2200           insulin aspart (NovoLOG) inj (RAPID ACTING)  Dose: 1-3 Units  Freq: 3 TIMES DAILY BEFORE MEALS Route: SC  Start: 06/05/18 0730   Admin Instructions: Correction Scale - LOW INSULIN RESISTANCE DOSING     Do Not give Correction Insulin if Pre-Meal BG less than 140.   For Pre-Meal  - 239 give 1 unit.   For Pre-Meal  - 339 give 2 units.   For Pre-Meal BG greater than or equal to 340 give 3 units.   To be given with prandial insulin, and based on pre-meal blood glucose.   Notify provider if glucose greater than or equal to 350 mg/dL after administration of correction dose.  If given at mealtime, must be administered 5 min before meal or immediately after.    Admin. Amount: 1-3 Units  Dispense Loc: Contact Rx for dose  Volume: 3 mL        (0738)-Not Given [C]       (1516)-Not Given [C]       (1802)-Not Given        (0842)-Not Given [C]       (1154)-Not Given [C]       (1844)-Not Given        (0844)-Not Given [C]       (1207)-Not Given [C]       (1814)-Not Given [C]        (0749)-Not Given       (1305)-Not Given       (1717)-Not Given  [C]           levothyroxine (SYNTHROID/LEVOTHROID) tablet 175 mcg  Dose: 175 mcg  Freq: EVERY EVENING Route: PO  Start: 06/04/18 2000   Admin Instructions: Separate oral administration of iron- or calcium-containing products and levothyroxine by at least 4 hours.    Admin. Amount: 1 tablet (1 × 25 mcg tablet) + 1 tablet (1 × 150 mcg tablet)  Last Admin: 06/07/18 2108  Dispense Loc: [facility] ADS 6C1   Mixture Administration Information:   Medication Type Amount   levothyroxine 25 MCG TABS Medications 25 mcg   levothyroxine 150 MCG TABS Medications 150 mcg               2103 (175 mcg)-Given        1925 (175 mcg)-Given        2036 (175 mcg)-Given        2108 (175 mcg)-Given        [ ] 2000           magnesium sulfate 4 g in 100 mL sterile water (premade)  Dose: 4 g  Freq: EVERY 4 HOURS PRN Route: IV  PRN Reason: magnesium supplementation  Start: 06/04/18 2045   Admin Instructions: For serum Mg++ less than 1.6 mg/dL  Give 4 g and recheck magnesium level 2 hours after dose, and next AM.    Admin. Amount: 4 g = 100 mL Conc: 4 g/100 mL  Dispense Loc: [facility] ADS 6C1  Infused Over: 120 Minutes  Volume: 100 mL               metoprolol succinate (TOPROL-XL) 24 hr tablet 100 mg  Dose: 100 mg  Freq: EVERY EVENING Route: PO  Start: 06/04/18 2000   Admin Instructions: DO NOT CRUSH. Tablet may be split in half along score line.    Admin. Amount: 1 tablet (1 × 100 mg tablet)  Last Admin: 06/07/18 2108  Dispense Loc: [facility] ADS 6C1       2103 (100 mg)-Given        1925 (100 mg)-Given        2027 (100 mg)-Given        2108 (100 mg)-Given        [ ] 2000           multivitamin, therapeutic with minerals (THERA-VIT-M) tablet 1 tablet  Dose: 1 tablet  Freq: DAILY Route: PO  Start: 06/05/18 0800   Admin. Amount: 1 tablet  Last Admin: 06/08/18 0748  Dispense Loc: [facility] ADS 6C1        1028 (1 tablet)-Given        0843 (1 tablet)-Given        0845 (1 tablet)-Given        0748 (1 tablet)-Given           naloxone (NARCAN) injection 0.1-0.4  mg  Dose: 0.1-0.4 mg  Freq: EVERY 2 MIN PRN Route: IV  PRN Reason: opioid reversal  Start: 06/06/18 1738   Admin Instructions: For respiratory rate LESS than or EQUAL to 8.  Partial reversal dose:  0.1 mg titrated q 2 minutes for Analgesia Side Effects Monitoring Sedation Level of 3 (frequently drowsy, arousable, drifts to sleep during conversation).Full reversal dose:  0.4 mg bolus for Analgesia Side Effects Monitoring Sedation Level of 4 (somnolent, minimal or no response to stimulation).  For ordered doses up to 2mg give IVP. Give each 0.4mg over 15 seconds in emergency situations. For non-emergent situations further dilute in 9mL of NS to facilitate titration of response.    Admin. Amount: 0.1-0.4 mg = 0.25-1 mL Conc: 0.4 mg/mL  Dispense Loc: [facility] ADS 6C1  Volume: 1 mL  POC: Cardiac Post-procedure               polyethylene glycol (MIRALAX/GLYCOLAX) Packet 17 g  Dose: 17 g  Freq: DAILY PRN Route: PO  PRN Reason: constipation  Start: 06/04/18 1840   Admin Instructions: Step 2  1 Packet = 17 grams. Mixed prescribed dose in 8 ounces of water. Follow with 8 oz. of water.    Admin. Amount: 17 g  Last Admin: 06/07/18 1208  Dispense Loc: [facility] ADS 6C1          1208 (17 g)-Given            potassium chloride (KLOR-CON) Packet 20-40 mEq  Dose: 20-40 mEq  Freq: EVERY 2 HOURS PRN Route: ORAL OR FEED  PRN Reason: potassium supplementation  Start: 06/05/18 1632   Admin Instructions: Use if unable to tolerate tablets.    If Serum K+ 3.4-4.0, dose = 20 mEq x1. Recheck K+ level the next AM.  If Serum K+ 3.0-3.3, dose = 60 mEq po total dose (40 mEq x 1 followed in 2 hours by 20 mEq X1). Recheck K+ level 4 hours after dose and the next AM.  If Serum K+ 2.5-2.9, dose = 80 mEq po total dose (40 mEq Q2H x2). Recheck K+ level 4 hours after dose and the next AM.  If Serum K+ less than 2.5, See IV order.  Dissolve packet contents in 4-8 ounces of cold water or juice.    Admin. Amount: 20-40 mEq  Last Admin: 06/06/18 1329  Dispense  Loc: [facility] ADS 6C1         2146 (20 mEq)-Given [C]             potassium chloride 10 mEq in 100 mL intermittent infusion with 10 mg lidocaine  Dose: 10 mEq  Freq: EVERY 1 HOUR PRN Route: IV  PRN Reason: potassium supplementation  Start: 06/05/18 1632   Admin Instructions: Infuse via PERIPHERAL LINE. Use potassium with lidocaine for pain with peripheral administration.  If Serum K+ 3.4-4.0, dose = 10 mEq/hr x2 doses. Recheck K+ level the next AM.  If Serum K+ 3.0-3.3, dose = 10 mEq/hr x4 doses (40 mEq IV total dose). Recheck K+ level 2 hours after dose and the next AM.  If Serum K+ less than 3.0, dose = 10 mEq/hr x6 doses (60 mEq IV total dose). Recheck K+ level 2 hours after dose and the next AM.    Admin. Amount: 10 mEq = 100 mL Conc: 10 mEq/100 mL  Dispense Loc: [facility] ADS 6C1  Infused Over: 1 Hours  Volume: 100 mL               potassium chloride 10 mEq in 100 mL sterile water intermittent infusion (premix)  Dose: 10 mEq  Freq: EVERY 1 HOUR PRN Route: IV  PRN Reason: potassium supplementation  Start: 06/05/18 1632   Admin Instructions: Infuse via PERIPHERAL LINE or CENTRAL LINE. Use for central line replacement if patient weight less than 65 kg, if patient is on TPN with high potassium content or if unit does not stock 20 mEq bags.  If Serum K+ 3.4-4.0, dose = 10 mEq/hr x2 doses. Recheck K+ level the next AM.  If Serum K+ 3.0-3.3, dose = 10 mEq/hr x4 doses (40 mEq IV total dose). Recheck K+ level 2 hours after dose and the next AM.  If Serum K+ less than 3.0, dose = 10 mEq/hr x6 doses (60 mEq IV total dose). Recheck K+ level 2 hours after dose and the next AM.    Admin. Amount: 10 mEq = 100 mL Conc: 10 mEq/100 mL  Dispense Loc: [facility]  Infused Over: 60 Minutes  Volume: 100 mL               potassium chloride 20 mEq in 50 mL intermittent infusion  Dose: 20 mEq  Freq: EVERY 1 HOUR PRN Route: IV  PRN Reason: potassium supplementation  Start: 06/05/18 1632   Admin Instructions: Infuse via CENTRAL  LINE Only.  May need EKG if less than 65 kg or on TPN - Max rate is 0.3 mEq/kg/hr for patients not on EKG monitoring.    If Serum K+ 3.4-4.0, dose = 20 mEq/hr x1 doses. Recheck K+ level the next AM.  If Serum K+ 3.0-3.3, dose = 20 mEq/hr x2 doses (40 mEq IV total dose).  Recheck K+ level 2 hours after dose and the next AM.  If Serum K+ less than 3.0, dose = 20 mEq/hr x3 doses (60 mEq IV total dose). Recheck K+ level 2 hours after dose and the next AM.    Admin. Amount: 20 mEq = 50 mL Conc: 20 mEq/50 mL  Dispense Loc: [facility]  Volume: 50 mL               potassium chloride SA (K-DUR/KLOR-CON M) CR tablet 20-40 mEq  Dose: 20-40 mEq  Freq: EVERY 2 HOURS PRN Route: PO  PRN Reason: potassium supplementation  Start: 06/05/18 1632   Admin Instructions: Use if able to take PO.   If Serum K+ 3.4-4.0, dose = 20 mEq x1. Recheck K+ level the next AM.  If Serum K+ 3.0-3.3, dose = 60 mEq po total dose (40 mEq x1 followed in 2 hours by 20 mEq x1). Recheck K+ level 4 hours after dose and the next AM.  If Serum K+ 2.5-2.9, dose = 80 mEq po total dose (40 mEq Q2H x2). Recheck K+ level 4 hours after dose and the next AM.  If Serum K+ less than 2.5, See IV order.  DO NOT CRUSH.    Admin. Amount: 2-4 tablet (2-4 × 10 mEq tablet)  Last Admin: 06/08/18 1047  Dispense Loc: [facility] ADS 6C1        1802 (20 mEq)-Given        1156 (20 mEq)-Given [C]        1059 (20 mEq)-Given [C]        1047 (20 mEq)-Given           senna-docusate (SENOKOT-S;PERICOLACE) 8.6-50 MG per tablet 1-2 tablet  Dose: 1-2 tablet  Freq: 2 TIMES DAILY PRN Route: PO  PRN Reason: constipation  Start: 06/04/18 1841   Admin Instructions: Step 1    Admin. Amount: 1-2 tablet  Dispense Loc: [facility] ADS 6C1               spironolactone (ALDACTONE) tablet 50 mg  Dose: 50 mg  Freq: EVERY EVENING Route: PO  Start: 06/06/18 2000   Admin. Amount: 1 tablet (1 × 50 mg tablet)  Last Admin: 06/07/18 2108  Dispense Loc: [facility] ADS 6C1         2026 (50 mg)-Given        2108  (50 mg)-Given        [ ]            tamsulosin (FLOMAX) capsule 0.4 mg  Dose: 0.4 mg  Freq: DAILY Route: PO  Start: 18 0800   Admin Instructions: Administer 30 minutes after the same meal each day.  Capsules should be swallowed whole; do not crush chew or open.    Admin. Amount: 1 capsule (1 × 0.4 mg capsule)  Last Admin: 18 0748  Dispense Loc: [facility] ADS 6C1        1027 (0.4 mg)-Given        0843 (0.4 mg)-Given        0845 (0.4 mg)-Given        0748 (0.4 mg)-Given           Warfarin Therapy Reminder (Check START DATE - warfarin may be starting in the FUTURE)  Dose: 1 each  Freq: CONTINUOUS PRN Route: XX  Start: 18 1833   Admin Instructions: *Note to reorder warfarin daily*  Pharmacy Warfarin Dosing Service  Patient is on Warfarin Therapy - check for daily order    Admin. Amount: 1 each  Dispense Loc: [facility]              Completed Medications  Medications 18         Dose: 500 mL  Freq: CONTINUOUS PRN Route: TABLE SOLN  PRN Comment: ACIST Catheter prep table solution use as directed by provider.  Maximum total dose 500 mL  Start: 18   End: 18 1640   Admin. Amount: 500 Units = 500 mL Conc: 1 Units/mL  Last Admin: 18 1640  Dispense Loc: [facility] ADS CATH3  Administrations Remainin  Volume: 500 mL  POC: Cardiac Intra-procedure         1640 (500 Units)-New Bag               Dose: 80 mL  Freq: ONCE Route: IA  Start: 18 173   End: 18 173   Admin. Amount: 80 mL  Last Admin: 18 173  Dispense Loc: [facility] RAD Floor Stock  Administrations Remainin  Volume: 80 mL         1730 (80 mL)-Given               Dose: 100-500 mcg  Freq: ONCE PRN Route: IA  PRN Comment: for radial artery sheath protocol, when verbally ordered by prescriber during the procedure  Start: 18   End: 18 1649   Admin Instructions: Prescriber will administer the dose.    Admin. Amount: 100-500 mcg =  1-5 mL Conc: 1,000 mcg/10 mL  Last Admin: 18  Dispense Loc: [facility] ADS CATH3  Administrations Remainin  Volume: 10 mL  POC: Cardiac Intra-procedure         1649 (200 mcg)-Given               Dose: 100 mcg  Freq: ONCE Route: IV  Start: 18 170   End: 18   Admin. Amount: 100 mcg = 0.01 mL Conc: 10,000 mcg/mL  Last Admin: 18  Dispense Loc: [facility]  Administrations Remainin  Volume: 1 mL   Current Line: Peripheral IV 18 Left;Posterior Lower forearm        1658 (100 mcg)-Given               Dose: 20 mEq  Freq: ONCE Route: PO  Start: 18   End: 18   Admin Instructions: Dissolve packet contents in 4-8 ounces of cold water or juice.    Admin. Amount: 20 mEq  Last Admin: 18  Dispense Loc: [facility] ADS 6C1  Administrations Remainin          (20 mEq)-Given               Dose: 12.5 mg  Freq: ONCE AT 6PM Route: PO  Start: 18   End: 18   Admin. Amount: 1 tablet (1 × 2.5 mg tablet) + 1 tablet (1 × 10 mg tablet)  Last Admin: 18  Dispense Loc: [facility] ADS 6C1  Administrations Remainin   Mixture Administration Information:   Medication Type Amount   warfarin 2.5 MG TABS Medications 2.5 mg   warfarin 10 MG TABS Medications 10 mg                   (12.5 mg)-Given              Dose: 12.5 mg  Freq: ONCE AT 6PM Route: PO  Start: 18   End: 18   Admin. Amount: 1 tablet (1 × 2.5 mg tablet) + 1 tablet (1 × 10 mg tablet)  Last Admin: 18  Dispense Loc: [facility] ADS 6C1  Administrations Remainin   Mixture Administration Information:   Medication Type Amount   warfarin 2.5 MG TABS Medications 2.5 mg   warfarin 10 MG TABS Medications 10 mg                  (12.5 mg)-Given               Dose: 15 mg  Freq: ONCE AT 6PM Route: PO  Start: 18   End: 18   Admin. Amount: 2 tablet (2 × 7.5 mg tablet)  Last Admin: 18  Dispense Loc: [facility]  ADS 6C1  Administrations Remainin           1706 (15 mg)-Given          Discontinued Medications  Medications 18         Dose: 300 mL  Freq: ONCE PRN Route: IV  PRN Reason: other  PRN Comment: for symptomatic hypotension with femoral sheath removal  Start: 18   End: 18   Admin Instructions: Post-procedurally for CAR cath lab procedure    Admin. Amount: 300 mL  Dispense Loc: [facility] Floor Stock  Volume: 300 mL  POC: Cardiac Post-procedure          0537-Med Discontinued          Dose: 500-1,000 mL  Freq: ONCE PRN Route: IV  PRN Comment: when verbally ordered by prescriber during procedure  Start: 18   End: 18   Admin Instructions: IV fluid bolus with pressure bag    Admin. Amount: 500-1,000 mL  Administrations Remainin  Volume: 1,000 mL  POC: Cardiac Intra-procedure         1736-Med Discontinued           Rate: 16.7 mL/hr Dose: 10 mcg/min  Freq: CONTINUOUS PRN Route: IV  PRN Comment: when verbally ordered by provider during procedure.  Start: 18   End: 18   Admin Instructions: Administer abciximab in separate intravenous line, apart from other medications.  Initiate infusion after loading dose (if ordered) is given.  Notify provider for further orders if  platelets are less than 100,000 to determine if abciximab (REOPRO) infusion should be stopped.    Volume: 250 mL  POC: Cardiac Intra-procedure   Mixture Administration Information:   Medication Type Amount   abciximab 2 MG/ML SOLN Medications 9 mg   D5W 5 % SOLN QS Base 245.5 mL                 1736-Med Discontinued           Dose: 0.25 mg/kg  Weight Dosing Info: 168.4 kg  Freq: ONCE PRN Route: IV  PRN Comment: when verbally ordered by provider during procedure.  Start: 18   End: 18   Admin Instructions: Administer abciximab in separate intravenous line, apart from other medications    Admin. Amount: 42.1 mg =  21.05 mL Conc: 2 mg/mL  Infused Over: 1 Minutes  Administrations Remainin  Volume: 21.05 mL  POC: Cardiac Intra-procedure         1736-Med Discontinued           Dose: 12-12,000 mcg  Freq: EVERY 1 MIN PRN Route: INTRACORONAR  PRN Comment: when ordered by prescriber during procedure.  Start: 18   End: 18   Admin Instructions: Nursing to dilute as instructed by provider.  Prescriber will infuse each dose.  May repeat Q1 min PRN at the discretion of the provider.    Admin. Amount: 12-12,000 mcg = 0.004-4 mL Conc: 3,000 mcg/mL  Dispense Loc: [facility] ADS ANES CATH  Volume: 4 mL  POC: Cardiac Intra-procedure         1736-Med Discontinued           Rate: 1,414.6 mL/hr Dose: 140 mcg/kg/min  Weight Dosing Info: 168.4 kg  Freq: CONTINUOUS PRN Route: IV  PRN Comment:  when verbally ordered by the prescriber during  procedure  Start: 18   End: 18   Admin Instructions: Infuse for 3-5 minutes, as directed.    Admin. Amount: 23,576 mcg/min  Infused Over: 3-5 Minutes  Volume: 90 mL  POC: Cardiac Intra-procedure   Mixture Administration Information:   Medication Type Amount   adenosine (diagnostic) 3 MG/ML SOLN Medications 90 mg   sodium chloride 0.9 % SOLN QS Base 60 mL                 1736-Med Discontinued           Dose: 150 mcg/kg  Weight Dosing Info: 168.4 kg  Freq: ONCE PRN Route: IV  PRN Comment: loading dose when verbally ordered by the prescriber during the procedure  Start: 18   End: 18   Admin Instructions: Administration instructions: THIS IS THE SECOND BOLUS DOSE from the drip and is used IF ACT is less than 300 seconds after the first bolus dose of 350 mcg/kg.    Check ACT after each additional bolus or change in the rate of infusion and titrate to achieve ACT of 300-450 seconds.     In case of dissection, impending abrupt closure, thrombus formation, or inability to achieve/maintain an ACT over 300 seconds, additional bolus dose of 150 mcg/kg may be  given and the infusion INcreased to 40 mcg/kg/min.  Nurse to administer dose from existing infusion. If no infusion bag or syringe for this order is available, contact pharmacist to re-enter medication order.    Admin. Amount: 25,260 mcg = 25.26 mL Conc: 1,000 mcg/mL  Infused Over: 3-5 Minutes  Administrations Remainin  POC: Cardiac Intra-procedure         1736-Med Discontinued           Dose: 350 mcg/kg  Weight Dosing Info: 168.4 kg  Freq: ONCE PRN Route: IV  PRN Comment: loading dose when verbally ordered by the prescriber during the procedure  Start: 18   End: 18   Admin Instructions: To be followed by a 25 mcg/kg/min continuous drip.   Check ACT 5-10 min after bolus. If ACT less than 300 seconds, give an additional 150 mcg/kg IV bolus once, followed by infusion dose INcrease to 30 mcg/kg/min.  IF -450 seconds, proceed with PCI.  IF ACT is greater than 450 seconds, DEcrease the infusion dose to 15 mcg/kg/min.  Check ACT after each additional bolus or change in the rate of infusion and titrate to achieve ACT of 300-450 seconds.     In case of dissection, impending abrupt closure, thrombus formation, or inability to achieve/maintain an ACT over 300 seconds, additional bolus dose of 150 mcg/kg may be given and the infusion INcreased to 40 mcg/kg/min.  Nurse to administer dose from existing infusion. If no infusion bag or syringe for this order is available, contact pharmacist to re-enter medication order.    Admin. Amount: 58,940 mcg = 58.94 mL Conc: 1,000 mcg/mL  Infused Over: 3-5 Minutes  Administrations Remainin  POC: Cardiac Intra-procedure         1736-Med Discontinued           Rate: 252.6 mL/hr Dose: 25 mcg/kg/min  Weight Dosing Info: 168.4 kg  Freq: CONTINUOUS PRN Route: IV  PRN Comment: when verbally ordered by prescriber during the procedure.  Start: 18   End: 18   Admin Instructions: Check ACT 5-10 minutes after bolus dosing, following adjustments  in the infusion rate, and at the end of the PCI procedure.  Obtain additional ACTs every 20-30 minutes during a prolonged procedure . If ACT less than 300 seconds, give an additional 150 mcg/kg IV bolus once, followed by infusion dose INcrease to 30 mcg/kg/min.  IF -450 seconds, proceed with PCI.  IF ACT is greater than 450 seconds, DEcrease the infusion dose to 15 mcg/kg/min.     In the case of a dissection, impending abrupt closure, thrombus formation during the procedure, or inability to achieve or maintain an ACT over 300 seconds, INcrease infusion dose to 40 mcg/kg/min.    Admin. Amount: 4,210 mcg/min  POC: Cardiac Intra-procedure         1736-Med Discontinued           Dose:  mg  Freq: ONCE PRN Route: PO  PRN Reason: other  PRN Comment: when verbally ordered by prescriber during the procedure.  Start: 18   End: 18   Admin. Amount: 1-4 tablet (1-4 × 81 mg tablet)  Dispense Loc: [facility] ADS CATH1  Administrations Remainin  POC: Cardiac Intra-procedure         1736-Med Discontinued           Dose: 325 mg  Freq: ONCE PRN Route: PO  PRN Reason: other  PRN Comment: when verbally ordered by prescriber during the procedure.  Start: 18   End: 18   Admin. Amount: 1 tablet (1 × 325 mg tablet)  Dispense Loc: [facility] ADS CATH1  Administrations Remainin  POC: Cardiac Intra-procedure         1736-Med Discontinued           Dose: 0.5 mg  Freq: EVERY 5 MIN PRN Route: IV  PRN Reason: other  PRN Comment: symptomatic bradycardia associated with FEMORAL sheath pulls  Start: 18   End: 18   Admin Instructions: May repeat x1.  Notify provider [Notify Interventional Fellow/Cardiologist ([facility]) or Cardiologist ([facility]) if no improvement after second dose of atropine.  Post-procedurally for CAR cath lab procedure.    Admin. Amount: 0.5 mg = 5 mL Conc: 0.1 mg/mL  Dispense Loc: [facility] ADS 6C1  Volume: 10 mL  POC: Cardiac Post-procedure          0537-Med  Discontinued          Dose: 0.5-1 mg  Freq: EVERY 1 MIN PRN Route: IV  PRN Reason: other  PRN Comment: when verbally ordered by prescriber during the procedure.  Start: 18   End: 18   Admin. Amount: 0.5-1 mg = 5-10 mL Conc: 0.1 mg/mL  Dispense Loc: [facility] ADS CATH1  Administrations Remaining: 3  Volume: 10 mL  POC: Cardiac Intra-procedure         1736-Med Discontinued           Rate: 58.9 mL/hr Dose: 1.75 mg/kg/hr  Weight Dosing Info: 168.4 kg  Freq: CONTINUOUS PRN Route: IV  PRN Comment: when verbally ordered by prescriber during procedure.  Start: 18   End: 18   Admin. Amount: 294.7 mg/hr  Volume: 50 mL  POC: Cardiac Intra-procedure   Mixture Administration Information:   Medication Type Amount   bivalirudin 250 MG SOLR Medications 250 mg   sodium chloride 0.9 % SOLN QS Base 45 mL                 1736-Med Discontinued           Dose: 0.75 mg/kg  Weight Dosing Info: 168.4 kg  Freq: ONCE PRN Route: IV  PRN Comment: when verbally ordered by prescriber during the procedure.  Start: 18   End: 18   Admin Instructions: Nurse to administer dose from existing infusion. If no infusion bag or syringe for this order is available, contact pharmacist to re-enter medication order.    Admin. Amount: 126.3 mg  Administrations Remainin  POC: Cardiac Intra-procedure         1736-Med Discontinued           Dose: 300-600 mg  Freq: ONCE PRN Route: PO  PRN Comment: when verbally ordered by prescriber during the procedure.  Start: 18   End: 18   Admin Instructions: Loading dose.    Admin. Amount: 4-8 tablet (4-8 × 75 mg tablet)  Dispense Loc: [facility] ADS CATH1  Administrations Remainin  POC: Cardiac Intra-procedure         1736-Med Discontinued           Dose: 75 mg  Freq: ONCE PRN Route: PO  PRN Comment: when verbally ordered by prescriber during the procedure.  Start: 18   End: 18   Admin. Amount: 1 tablet (1 × 75 mg  tablet)  Dispense Loc: [facility] ADS CATH1  Administrations Remainin  POC: Cardiac Intra-procedure         1736-Med Discontinued           Dose: 12.5-50 mL  Freq: EVERY 30 MIN PRN Route: IV  PRN Reason: other  PRN Comment: when verbally ordered by prescriber during the procedure.  Start: 18   End: 18   Admin Instructions: Vesicant. For ordered doses up to 25 g, give IV Push undiluted. Give each 5g over 1 minute.    Admin. Amount: 12.5-50 mL  Dispense Loc: [facility] ADS CATH1  Infused Over: 1-5 Minutes  Volume: 50 mL  POC: Cardiac Intra-procedure         1736-Med Discontinued           Dose: 25-50 mg  Freq: ONCE PRN Route: IV  PRN Reason: other  PRN Comment: when verbally ordered by prescriber during the procedure.  Start: 18   End: 18   Admin Instructions: Can be given as 25mg x 2 doses, or, as 50mg x 1 dose.   Give no faster than 25mg/min.  For ordered doses up to 50 mg, give IV Push undiluted. Give each 25mg over a minimum of 1 minute. Extend in non-emergency    Admin. Amount: 25-50 mg = 0.5-1 mL Conc: 50 mg/mL  Dispense Loc: [facility] ADS ANES CATH  Infused Over: 1-2 Minutes  Administrations Remainin  Volume: 1 mL  POC: Cardiac Intra-procedure         1736-Med Discontinued           Dose: 1.25-2.5 mg  Freq: ONCE PRN Route: IV  PRN Reason: other  PRN Comment: when verbally ordered by the prescriber during the procedure.   Start: 18   End: 18   Admin Instructions: For ordered doses up to 1.25 mg, give IV Push undiluted over 5 minutes.    Admin. Amount: 1.25-2.5 mg = 1-2 mL Conc: 1.25 mg/mL  Infused Over: 5 Minutes  Administrations Remainin  Volume: 2 mL  POC: Cardiac Intra-procedure         1736-Med Discontinued           Rate: 10.1 mL/hr Dose: 2 ng/kg/min  Weight Dosing Info: 168.4 kg  Freq: CONTINUOUS PRN Route: IV  PRN Comment: when verbally ordered by prescriber during the procedure  Start: 18 1636   End: 18 1736   Admin  Instructions: FOR USE IN CATH LAB ONLY.  Titrate based on SWAN Daniel and systolic blood pressure every 2 minutes to a maximum of 16 nanograms/kg/min per physician direction.  Expires after 8 hours at room temp.  Protect from light.    Admin. Amount: 0.3368 mcg/min  Infused Over: 10-15 Minutes  Volume: 100 mL  POC: Cardiac Intra-procedure   Mixture Administration Information:   Medication Type Amount   epoprostenol 0.5 MG SOLR Medications 0.2 mg   glycine diluent SOLN Base 100 mL                 1736-Med Discontinued           Dose: 180 mcg/kg  Weight Dosing Info: 168.4 kg  Freq: EVERY 10 MIN PRN Route: IV  PRN Comment: when verbally ordered by prescriber during procedure.  Start: 18   End: 18   Admin. Amount: 30,320 mcg = 15.16 mL Conc: 2,000 mcg/mL  Dispense Loc: [facility] ADS CATH3  Infused Over: 1-2 Minutes  Administrations Remainin  Volume: 15.16 mL  POC: Cardiac Intra-procedure         1736-Med Discontinued           Rate: 7.5 mL/hr Dose: 15 mg/hr  Freq: CONTINUOUS PRN Route: IV  PRN Comment: when verbally ordered by prescriber during procedure.  Start: 18   End: 18   Admin Instructions: Initiate infusion after loading dose (if ordered) is given.  Notify provider for further orders if platelets are less than 100,000 to determine if eptifibatide (Integrilin) infusion should be stopped.    Dispense Loc: [facility] ADS CATH3  Volume: 100 mL  POC: Cardiac Intra-procedure         1736-Med Discontinued           Dose: 25-50 mcg  Freq: EVERY 2 MIN PRN Route: IV  PRN Reason: severe pain  PRN Comment: or sedation, when verbally ordered by the provider during the procedure.  Start: 18   End: 18   Admin Instructions: For range orders: start at lowest dose ordered to achieve the defined goal sedation score.  Doses can be exceeded under direct oversight of patient by physician.  For ordered doses up to 100 mcg give IV Push undiluted over a minimum of 3-5  minutes.    Order specific questions:  Population for use? Adult Sedation  Goal Umanzor Agitation Sedation Scale (RASS) Score Goal Range -2 Light Sedation to -3 Moderate Sedation     Admin. Amount: 25-50 mcg = 0.5-1 mL Conc: 50 mcg/mL  Last Admin: 18  Dispense Loc: [facility] ADS ANES CATH  Volume: 2 mL  POC: Cardiac Intra-procedure   Current Line: Peripheral IV 18 Left;Posterior Lower forearm        1707 (50 mcg)-Given       1711 (50 mcg)-Given       -Med Discontinued           Dose: 0.2 mg  Freq: EVERY 1 MIN PRN Route: IV  PRN Reason: benzodiazepine reversal  Start: 18   End: 18 0537   Admin Instructions: May repeat X3.  Notify provider [Notify Interventional Fellow/Cardiologist ([facility]) or Cardiologist (/)].  Post-procedurally for CAR cath lab procedure.  Irritant. For ordered doses up to 1 mg, give IV Push undiluted. Administer each 0.2mg over 15 seconds.    Admin. Amount: 0.2 mg = 2 mL Conc: 0.1 mg/mL  Dispense Loc: [facility] ADS 6C1  Infused Over: 1 Minutes  Volume: 5 mL  POC: Cardiac Post-procedure          0537-Med Discontinued          Dose: 0.2 mg  Freq: EVERY 1 MIN PRN Route: IV  PRN Reason: other  PRN Comment: sedation reversal, when verbally ordered by prescriber during the procedure.  Start: 18   End: 18   Admin Instructions: Max cumulative dose of 1 mg.  Irritant. For ordered doses up to 1 mg, give IV Push undiluted. Administer each 0.2mg over 15 seconds.    Admin. Amount: 0.2 mg = 2 mL Conc: 0.1 mg/mL  Dispense Loc: [facility] ADS CATH1  Infused Over: 1 Minutes  Administrations Remainin  Volume: 5 mL  POC: Cardiac Intra-procedure         1736-Med Discontinued           Dose:  mg  Freq: ONCE PRN Route: IV  PRN Comment: when verbally ordered by prescriber during the procedure.  Start: 18   End: 18   Admin Instructions: For ordered doses up to 40 mg, give IV Push undiluted over 1-2 minutes.    Admin. Amount:  mg  = 2-10 mL Conc: 10 mg/mL  Dispense Loc: [facility] ADS ANES CATH  Infused Over: 1-2 Minutes  Administrations Remainin  Volume: 12 mL  POC: Cardiac Intra-procedure         1736-Med Discontinued           Dose: 80 mg  Freq: EVERY 8 HOURS Route: IV  Start: 18 0800   End: 18   Admin Instructions: For ordered doses up to 40 mg, give IV Push undiluted over 1-2 minutes.    Admin. Amount: 80 mg = 8 mL Conc: 10 mg/mL  Last Admin: 18 0843  Dispense Loc: [facility] ADS 6C1  Infused Over: 1-2 Minutes  Volume: 8 mL   Current Line: Peripheral IV 18 Left;Posterior Lower forearm       1028 (80 mg)-Given       1510 (80 mg)-Given       2323 (80 mg)-Given        0843 (80 mg)-Given       -Med Discontinued           Dose: 1,000-10,000 Units  Freq: EVERY 5 MIN PRN Route: IV  PRN Reason: other  PRN Comment: bolus dose  Start: 18   End: 18   Admin Instructions: when verbally ordered by prescriber during procedure.   Up to a max of 25,000 units.  Physician may request an additional bolus.    Admin. Amount: 1,000-10,000 Units = 1-10 mL Conc: 1,000 Units/mL  Last Admin: 18  Dispense Loc: [facility] ADS ANES CATH  Volume: 10 mL  POC: Cardiac Intra-procedure   Current Line: Peripheral IV 18 Left;Posterior Lower forearm        1648 (5,000 Units)-Given       -Med Discontinued           Dose: 1500 mL  Freq: CONTINUOUS PRN Route: TABLE SOLN  PRN Comment: Catheter prep table solution use as directed by provider.  Maximum total dose 5 liters.  Start: 18   End: 18   Admin. Amount: 1,500 Units = 1,500 mL Conc: 1 Units/mL  Last Admin: 18 1640  Administrations Remainin  Volume: 1,500 mL  POC: Cardiac Intra-procedure         1640 (1,500 Units)-New Bag       1736-Med Discontinued           Rate: 1-10 mL/hr Dose: 100-1000 Units/hr  Freq: CONTINUOUS PRN Route: IV  PRN Comment: when verbally ordered by prescriber during the procedure.  Start: 18 6296    End: 18   Dispense Loc: [facility] ADS CATH1  Volume: 250 mL  POC: Cardiac Intra-procedure         1736-Med Discontinued           Dose: 10-20 mg  Freq: EVERY 15 MIN PRN Route: IV  PRN Reason: other  PRN Comment:  to manage blood pressure, when verbally ordered by prescriber during the procedure.  Start: 18   End: 18   Admin Instructions: Can be given as 10mg x 2 doses, or, as 20mg x 1 dose.  Give to keep Systolic Blood Pressure less than 160 mmHg.  For ordered doses up to 40 mg, give IV Push undiluted over 1 minute.    Admin. Amount: 10-20 mg = 0.5-1 mL Conc: 20 mg/mL  Dispense Loc: [facility] ADS ANES CATH  Infused Over: 1 Minutes  Administrations Remainin  Volume: 1 mL  POC: Cardiac Intra-procedure         1736-Med Discontinued           Dose: 30 mL  Freq: ONCE PRN Route: SC  PRN Comment: local anesthetic  Start: 18   End: 18   Admin Instructions: Dose may be  into smaller doses dependent upon the procedure when ordered by prescriber during the procedure.    Admin. Amount: 30 mL  Dispense Loc: [facility] ADS CATH1  Administrations Remainin  Volume: 30 mL  POC: Cardiac Intra-procedure         1736-Med Discontinued           Dose: 5 mg  Freq: 2 TIMES DAILY Route: PO  Start: 18   End: 18 1435   Admin. Amount: 1 tablet (1 × 5 mg tablet)  Last Admin: 18 0845  Dispense Loc: [facility] ADS 6C1       2103 (5 mg)-Given        1027 (5 mg)-Given       1926 (5 mg)-Given        0843 (5 mg)-Given       2026 (5 mg)-Given        0845 (5 mg)-Given       1435-Med Discontinued          Dose: 0.5-2 mg  Freq: EVERY 4 HOURS PRN Route: IV  PRN Reason: other  PRN Comment: when verbally ordered by prescriber during the procedure.  Start: 18   End: 18   Admin Instructions: For IV PUSH: Dilute with equal volume of NS. For ordered doses up to 4 mg give IV Push. Administer each 2mg over 1-5 minutes.    Admin. Amount: 0.5-2 mg = 0.25-1 mL  Conc: 2 mg/mL  Dispense Loc: [facility] ADS CATH1  Volume: 1 mL  POC: Cardiac Intra-procedure         1736-Med Discontinued           Dose: 125 mg  Freq: ONCE PRN Route: IV  PRN Comment: when verbally ordered by prescriber during the procedure  Start: 18   End: 18   Admin Instructions: Give Doses 125mg and less IV Push over 2-3 minutes - reconstitute with 2 mL of bacteriostatic water if no diluent is provided. Doses greater than 125 mg need to be in at least 50 mL IVPB.    Admin. Amount: 125 mg = 2 mL Conc: 125 mg/2 mL  Dispense Loc: [facility] ADS CATH1  Administrations Remainin  Volume: 2 mL  POC: Cardiac Intra-procedure         1736-Med Discontinued           Dose: 5 mg  Freq: EVERY 5 MIN PRN Route: IV  PRN Reason: other  PRN Comment: when verbally ordered by prescriber during the procedure.  Start: 18   End: 18   Admin Instructions: Max of 3 doses  For ordered doses up to 15 mg, give IV Push undiluted over 5-10 minutes.    Admin. Amount: 5 mg = 5 mL Conc: 1 mg/mL  Dispense Loc: [facility] ADS ANES CATH  Infused Over: 5-10 Minutes  Administrations Remaining: 3  Volume: 5 mL  POC: Cardiac Intra-procedure         1736-Med Discontinued           Dose: 0.5-2 mg  Freq: EVERY 2 MIN PRN Route: IV  PRN Reason: sedation  PRN Comment: when verbally ordered by the provider during the procedure.  Start: 18   End: 18   Admin Instructions: For range orders: start at lowest dose ordered to achieve the defined goal sedation score.  Doses can be exceeded under direct oversight of patient by physician.  For ordered doses up to 2.5 mg give IV Push slowly titrated over a minimum of 2 minutes. Dilute each 1mg in 4mL of NS.    Order specific questions:  Population for use? Adult Sedation  Goal Umanzor Agitation Sedation Scale (RASS) Score Goal Range -2 Light Sedation to -3 Moderate Sedation     Admin. Amount: 0.5-2 mg = 0.5-2 mL Conc: 1 mg/mL  Last Admin: 18  1644  Dispense Loc: [facility] ADS ANES CATH  Infused Over: 2 Minutes  Volume: 2 mL  POC: Cardiac Intra-procedure   Current Line: Peripheral IV 06/04/18 Left;Posterior Lower forearm        1635 (1 mg)-Given       1644 (1 mg)-Given       1736-Med Discontinued           Dose: 0.2-0.4 mg  Freq: EVERY 2 MIN PRN Route: IV  PRN Reasons: opioid reversal,other  PRN Comment: respiratory depression  Start: 06/06/18 1738   End: 06/07/18 0537   Admin Instructions: Notify provider [Notify Interventional Fellow/Cardiologist ([facility]) or Cardiologist (,)].  For respiratory rate less than or equal to 8.  Partial reversal dose:  0.2 mg titrated q 2 minutes for Sedation Level of 3 (frequently drowsy, arousable, drifts to sleep during conversation).Full reversal dose:  0.4 mg bolus for Sedation Level of 4 (somnolent, minimal or no response to stimulation).  Post-procedurally for CAR cath lab procedure.  For ordered doses up to 2mg give IVP. Give each 0.4mg over 15 seconds in emergency situations. For non-emergent situations further dilute in 9mL of NS to facilitate titration of response.    Admin. Amount: 0.2-0.4 mg = 0.5-1 mL Conc: 0.4 mg/mL  Dispense Loc: [facility] ADS 6C1  Volume: 1 mL  POC: Cardiac Post-procedure          0537-Med Discontinued          Dose: 0.4 mg  Freq: EVERY 5 MIN PRN Route: IV  PRN Reason: other  PRN Comment: when verbally ordered by prescriber during the procedure.  Start: 06/06/18 1635   End: 06/06/18 1736   Admin Instructions: For respiratory rate LESS than or EQUAL to 8.  Partial reversal dose:  0.1 mg titrated q 2 minutes for Analgesia Side Effects Monitoring Sedation Level of 3 (frequently drowsy, arousable, drifts to sleep during conversation).Full reversal dose:  0.4 mg bolus for Analgesia Side Effects Monitoring Sedation Level of 4 (somnolent, minimal or no response to stimulation).  For ordered doses up to 2mg give IVP. Give each 0.4mg over 15 seconds in emergency situations. For non-emergent situations  further dilute in 9mL of NS to facilitate titration of response.    Admin. Amount: 0.4 mg = 1 mL Conc: 0.4 mg/mL  Dispense Loc: [facility] ADS CATH1  Volume: 1 mL  POC: Cardiac Intra-procedure         1736-Med Discontinued           Dose: 100 mcg  Freq: EVERY 1 MIN PRN Route: INTRACORONAR  PRN Comment: when verbally ordered during the procedure   Start: 18   End: 18   Admin Instructions: To a max of 1 mg  Protect from light.    Admin. Amount: 100 mcg = 0.04 mL Conc: 2,500 mcg/mL  Dispense Loc: [facility] ADS CATH1  Volume: 0.04 mL  POC: Cardiac Intra-procedure         1736-Med Discontinued           Dose: 100 mcg  Freq: EVERY 1 MIN PRN Route: IA  PRN Comment: when verbally ordered during the procedure.  Start: 18   End: 18   Admin Instructions: To a max of 1 mg.  Protect from light.    Admin. Amount: 100 mcg = 0.04 mL Conc: 2,500 mcg/mL  Last Admin: 18  Dispense Loc: [facility] ADS CATH1  Volume: 0.04 mL  POC: Cardiac Intra-procedure         1649 (200 mcg)-Given       1736-Med Discontinued           Dose: 10 mg  Freq: ONCE PRN Route: PO  PRN Reason: other  PRN Comment: when verbally ordered by the prescriber during the procedure  Start: 18   End: 18   Admin Instructions: Puncture and swallow capsule and give in Cath Lab ONLY.    Admin. Amount: 1 capsule (1 × 10 mg capsule)  Dispense Loc: [facility] ADS CATH1  Administrations Remainin  POC: Cardiac Intra-procedure         1736-Med Discontinued           Dose: 0.4 mg  Freq: EVERY 5 MIN PRN Route: SL  PRN Reason: chest pain  PRN Comment: when verbally ordered by prescriber during the procedure.  Start: 18   End: 18   Admin Instructions: Max of 3 doses in 15 minutes.    Admin. Amount: 1 tablet (1 × 0.4 mg tablet)  Dispense Loc: [facility] ADS CATH1  POC: Cardiac Intra-procedure         1736-Med Discontinued           Rate: 3.5-60.1 mL/hr Dose: 0.07-1.19 mcg/kg/min  Weight Dosing  Info: 168.4 kg  Freq: CONTINUOUS PRN Route: IV  PRN Comment: when verbally ordered by prescriber during the procedure.  Start: 06/06/18 1635   End: 06/06/18 1736   Admin Instructions: Start at 0.07 to 0.15 mcg/kg/min. Titrate up by 0.1 to 0.2 mcg/kg/min every 5 minutes PRN as directed by the prescriber to a max of 2 mcg/kg/min  Vesicant.    Admin. Amount: 11.788-200.396 mcg/min  Dispense Loc: [facility] ADS ANES CATH  Volume: 250 mL  POC: Cardiac Intra-procedure         1736-Med Discontinued           Dose: 100-200 mcg  Freq: EVERY 1 MIN PRN Route: INTRACORONAR  PRN Comment: when verbally ordered by prescriber during procedure  Start: 06/06/18 1635   End: 06/06/18 1736   Admin Instructions: Prescriber will infuse each dose    Admin. Amount: 100-200 mcg = 1-2 mL Conc: 1,000 mcg/10 mL  Dispense Loc: [facility] ADS CATH3  Volume: 10 mL  POC: Cardiac Intra-procedure         1736-Med Discontinued           Rate: 6.3-252.6 mL/hr Dose: 0.25-10 mcg/kg/min  Weight Dosing Info: 168.4 kg  Freq: CONTINUOUS PRN Route: IV  PRN Reason: other  PRN Comment: when verbally ordered by prescriber during procedure  Start: 06/06/18 1635   End: 06/06/18 1736   Admin Instructions: For pulmonary hypertension or hypertensive crisis.  Titrate up by 0.25 to 0.5 mcg/kg/min every 3 minutes PRN as directed by prescriber  Conc: 0.4 mg/mL. Protect from light. Irritant.    Admin. Amount: 42.1-1,684 mcg/min  Volume: 125 mL  POC: Cardiac Intra-procedure   Mixture Administration Information:   Medication Type Amount   nitroPRUsside 25 MG/ML SOLN Medications 0.4 mg/mL   sodium thiosulfate 25 % SOLN Additives 4 mg/mL   D5W 5 % SOLN QS Base 125 mL                 1736-Med Discontinued           Dose: 100-200 mcg  Freq: EVERY 2 MIN PRN Route: INTRACORONAR  PRN Comment: when ordered by prescriber during procedure.    Start: 06/06/18 1635   End: 06/06/18 1736   Admin Instructions: Provider will infuse each dose.  Protect from light.    Admin. Amount: 100-200 mcg =  0.004-0.008 mL Conc: 25,000 mcg/mL  Dispense Loc: [facility] ADS CATH1  Volume: 0.008 mL  POC: Cardiac Intra-procedure         1736-Med Discontinued           Dose: 4 mg  Freq: EVERY 4 HOURS PRN Route: IV  PRN Reason: nausea  PRN Comment: when verbally ordered by prescriber during procedure  Start: 18   End: 18   Admin Instructions: Irritant. For ordered doses up to 4 mg, give IV Push undiluted over 2-5 minutes.    Admin. Amount: 4 mg = 2 mL Conc: 4 mg/2 mL  Dispense Loc: [facility] ADS ANES CATH  Infused Over: 2-5 Minutes  Volume: 2 mL  POC: Cardiac Intra-procedure         1736-Med Discontinued           Dose: 10 mEq  Freq: ONCE PRN Route: IV  PRN Reason: potassium supplementation  PRN Comment: when verbally ordered by prescriber during procedure   Start: 18   End: 18   Admin Instructions: for K+ less than 3.5    Admin. Amount: 10 mEq = 100 mL Conc: 10 mEq/100 mL  Dispense Loc: [facility] ADS CATH1  Infused Over: 60 Minutes  Administrations Remainin  Volume: 100 mL  POC: Cardiac Intra-procedure         1736-Med Discontinued           Dose: 10-60 mg  Freq: ONCE PRN Route: PO  PRN Comment: when verbally ordered by prescriber during procedure   Start: 18   End: 18   Admin Instructions: May be crushed - must be given immediately after crushing.   Administration via an enteral tube that bypasses the acidic environment of the stomach may result in reduced bioavailability of prasugrel.       Admin. Amount: 1-6 tablet (1-6 × 10 mg tablet)  Dispense Loc: [facility] ADS CATH1  Administrations Remainin  POC: Cardiac Intra-procedure         1736-Med Discontinued           Dose: 1-5 mg  Freq: ONCE PRN Route: IV  PRN Comment:  when verbally ordered by prescriber during procedure.  Start: 18   End: 18   Admin Instructions: TEST DOSE  Give slow IV push    Admin. Amount: 1-5 mg = 0.1-0.5 mL Conc: 10 mg/mL  Dispense Loc: [facility] ADS ANES  CATH  Administrations Remainin  Volume: 5 mL  POC: Cardiac Intra-procedure         1736-Med Discontinued           Dose:  mg  Freq: EVERY 5 MIN PRN Route: IV  PRN Comment:  when verbally ordered by prescriber during procedure.  Start: 18   End: 18   Admin Instructions: Can be started 5 minutes after test dose.  Give slow IV push    Admin. Amount:  mg = 2.5-10 mL Conc: 10 mg/mL  Dispense Loc: [facility] ADS ANES CATH  Volume: 10 mL  POC: Cardiac Intra-procedure         1736-Med Discontinued           Dose: 50 mEq  Freq: EVERY 5 MIN PRN Route: IV  PRN Comment: up to a max of 200 mEq, when verbally ordered by prescriber during the procedure    Start: 18   End: 18   Admin Instructions: Vesicant.    Admin. Amount: 50 mEq = 50 mL Conc: 1 mEq/mL  Dispense Loc: [facility] ADS CATH3  Volume: 50 mL  POC: Cardiac Intra-procedure         1736-Med Discontinued           Dose: 1000 mL  Freq: CONTINUOUS PRN Route: TABLE SOLN  PRN Comment: Catheter prep table solution use as directed by provider.  Maximum total dose1 liters.  Start: 18   End: 18   Admin Instructions: Sodium chloride is used as a catheter prep table solution as directed by the provider.    Admin. Amount: 1,000 mL  Administrations Remainin  Volume: 1,000 mL  POC: Cardiac Intra-procedure         1736-Med Discontinued           Dose: 25 mg  Freq: EVERY EVENING Route: PO  Start: 18   End: 18   Admin. Amount: 1 tablet (1 × 25 mg tablet)  Last Admin: 18  Dispense Loc: [facility] ADS 6C1       2103 (25 mg)-Given        192 (25 mg)-Given        0-Med Discontinued           Dose:  mg  Freq: ONCE PRN Route: PO  PRN Comment: when verbally ordered by prescriber during the procedure  Start: 18   End: 18   Admin. Amount: 1-2 tablet (1-2 × 90 mg tablet)  Dispense Loc: [facility] ADS CATH1  Administrations Remainin  POC: Cardiac  Intra-procedure         1736-Med Discontinued           Dose: 20 mg  Freq: DAILY Route: PO  Start: 18   End: 18   Admin. Amount: 1 tablet (1 × 20 mg tablet)         1944-Med Discontinued           Dose: 50 mg  Freq: EVERY EVENING Route: PO  Start: 18   End: 18   Admin. Amount: 1 tablet (1 × 10 mg tablet) + 2 tablet (2 × 20 mg tablet)  Last Admin: 18  Dispense Loc: [facility] ADS 6C1   Mixture Administration Information:   Medication Type Amount   torsemide 10 MG TABS Medications 10 mg   torsemide 20 MG TABS Medications 40 mg                  (50 mg)-Given        1435-Med Discontinued          Dose: 50 mg  Freq: 2 TIMES DAILY. Route: PO  Start: 18   End: 18   Admin. Amount: 1 tablet (1 × 10 mg tablet) + 2 tablet (2 × 20 mg tablet)   Mixture Administration Information:   Medication Type Amount   torsemide 10 MG TABS Medications 10 mg   torsemide 20 MG TABS Medications 40 mg                 1943-Med Discontinued           Dose: 75 mg  Freq: EVERY MORNING Route: PO  Start: 18 08   End: 18   Admin. Amount: 1 tablet (1 × 5 mg tablet) + 1 tablet (1 × 20 mg tablet) + 1 half-tab (1 × 50 mg half-tab)  Last Admin: 1846  Dispense Loc: [facility]   Mixture Administration Information:   Medication Type Amount   torsemide 5 MG TABS Medications 5 mg   torsemide 20 MG TABS Medications 20 mg   torsemide 50 mg TABS Medications 50 mg                  0846 (75 mg)-Given       1435-Med Discontinued          Dose: 40 Units  Freq: ONCE PRN Route: IV  PRN Reason: other  PRN Comment: when verbally ordered by prescriber during procedure   Start: 18   End: 18   Admin Instructions: Vesicant.    Admin. Amount: 40 Units = 2 mL Conc: 20 Units/mL  Administrations Remainin  Volume: 2 mL  POC: Cardiac Intra-procedure         1736-Med Discontinued           Dose: 1 each  Freq: NO DOSE TODAY (WARFARIN) Route:  XX  Start: 18 1250   End: 18 1133   Admin Instructions: No dose of Warfarin due today per order    Admin. Amount: 1 each  Dispense Loc: [facility]  Administrations Remainin         1133-Med Discontinued      Medications 18

## 2018-06-04 NOTE — IP AVS SNAPSHOT
[address] [address]    Phone:  [phone]                                       After Visit Summary   6/4/2018    Clarke Moreira    MRN: [id number]           After Visit Summary Signature Page     I have received my discharge instructions, and my questions have been answered. I have discussed any challenges I see with this plan with the nurse or doctor.    ..........................................................................................................................................  Patient/Patient Representative Signature      ..........................................................................................................................................  Patient Representative Print Name and Relationship to Patient    ..................................................               ................................................  Date                                            Time    ..........................................................................................................................................  Reviewed by Signature/Title    ...................................................              ..............................................  Date                                                            Time

## 2018-06-04 NOTE — H&P
History and Physical  Clarke Moreira MRN: [id number]  Age: 68 year old, : 1950  Primary care provider: Matthias Sanchez           Chief Complaint:     HFpEF exacerbation           History of Present Illness:     HISTORY OF PRESENT ILLNESS:  Clarke Moreira is a 68 year old with a PMH that is pertinent for HFpEF , A. Fib s/p ablation  , THONG, HTN , HLD, CAD ?? , PAD , hypothyrodis , DM II , MDD/anxiety who is presenting for possible HFpEF exacerbation and possible Right/LEft heart cath.      Patient was lase seen in cardiology clinic on  at that time his lasix was increased and he was scheduled for RHC and coronary angiography.     Patient reports that the procedure didn't happen because of scheduling issues. He has been doing well , weight has been stable at 383 up until 1-2 weeks ago where he noted 5-10 pounds weight gain. He also reports increase abdominal distention and LE edema. He Sleeps at angle and had to increase it recently, no PND.  Denies new chest pain , palpitation , dizziness. Feels that the weight / water gain has been related to recently being stressed over his birthday () . He can walk about 100 yards before getting winded which is unchanged over the past month. He saw his PCP on  who noted the weight gain and contacted cardiology. Patient was advised to present for medication adjustment as well as RHC/LHC.     Upon arrival patient was doing , HD stable with HR ~100, /69, Spo 95% on RA. Labs were pertinent for BNP 1621 and Urica acid 8.0 otherwise within normal limit.        ROS : 10 point review of systems was performed and was positive for :  Cough : since starting on CPAP - productive clear / often yellow.  Recent discoloration of right toe.   Doesn't follow specific salt restriction.   Otherwise negative or per HPI.             Past Medical History:     Reviewed with patient on 2018   Past Medical History:   Diagnosis Date     Atrial fibrillation  (H)      Basal cell carcinoma      Chronic anticoagulation      Diastolic heart failure (H)      Dyslipidemia      Essential hypertension      Morbid obesity (H)      Sleep-disordered breathing      Type 2 diabetes mellitus (H)        Past Surgical History:   Procedure Laterality Date     BIOPSY OF SKIN LESION       MOHS MICROGRAPHIC PROCEDURE       PICC INSERTION Right 09/24/2017    5fr TL Bard PICC, 51cm (1cm external) in the R medial brachial vein w/ tip in the SVC.               Social History:     Lives by him self  Not working   Reads books / and watches TV for fun   Non smoker   Sober from alcohol  for 15 years   No family           Family History:     Family History   Problem Relation Age of Onset     Depression Mother      CANCER No family hx of      No family history of skin cancer             Allergies:     No Known Allergies             Medications:     PTA Meds  Prior to Admission medications    Medication Sig Last Dose Taking? Auth Provider   aspirin 81 MG tablet Take 1 tablet (81 mg) by mouth daily   Ari Reyna MD   atorvastatin (LIPITOR) 40 MG tablet Take 1 tablet (40 mg) by mouth daily   Matthias Sanchez MD   blood glucose monitoring (ONE TOUCH DELICA) lancets Use to test blood sugars 2 times daily or as directed.   Matthias Sanchez MD   blood glucose monitoring (ONE TOUCH ULTRA MINI) meter device kit Use to test blood sugars 2 times daily or as directed.   Neville Moore MD   blood glucose monitoring (ONETOUCH ULTRA) test strip Use to test blood sugars 2 times daily or as directed.   Matthias Sanchez MD   furosemide (LASIX) 20 MG tablet Take 80 mg in the morning (four pills) and 60 mg (three pills) in the afternoons.   Sybil Norman, APRN CNP   levothyroxine (SYNTHROID/LEVOTHROID) 175 MCG tablet Take 1 tablet (175 mcg) by mouth daily   Matthias Sanchez MD   lisinopril (PRINIVIL/ZESTRIL) 5 MG tablet Take 1 tablet (5 mg) by mouth 2 times daily   Matthias Sanchez MD   metFORMIN (GLUCOPHAGE) 500  MG tablet Take 1 tablet (500 mg) by mouth 2 times daily (with meals)   Matthias Sanchez MD   metoprolol (TOPROL-XL) 100 MG 24 hr tablet Take 1 tablet (100 mg) by mouth daily   Matthias Sanchez MD   multivitamin, therapeutic with minerals (MULTI-VITAMIN) TABS tablet Take 1 tablet by mouth daily   Matthias Sanchez MD   omega 3 1000 MG CAPS Take 1 g by mouth daily   Matthias Sanchez MD   order for DME Equipment being ordered: Bariatric Lift chair  Diagnosis - morbid obesity, CHF, Lymphedema    Fax to Ne from [employer] at [phone].   Matthias Sanchez MD   order for DME Equipment being ordered: Other: Velcro Compression stockings.   Treatment Diagnosis: bilateral lymphedema.   Oswald Mcneal MD   polyethylene glycol (MIRALAX/GLYCOLAX) Packet Take 17 g by mouth daily as needed for constipation   Matthias Sanchez MD   potassium chloride SA (K-DUR/KLOR-CON M) 20 MEQ CR tablet Take 40 meq in the morning and 20 meq in the afternoons.   Sybil Norman, APRN CNP   senna-docusate (SENOKOT-S;PERICOLACE) 8.6-50 MG per tablet Take 1-2 tablets by mouth 2 times daily as needed for constipation   Matthias Sanchez MD   spironolactone (ALDACTONE) 25 MG tablet Take 1 tablet (25 mg) by mouth daily   Matthias Sanchez MD   tamsulosin (FLOMAX) 0.4 MG capsule Take 1 capsule (0.4 mg) by mouth daily   Matthias Sanchez MD   warfarin (COUMADIN) 5 MG tablet Take 2.5 tablets (12.5 mg) by mouth daily   Matthias Sanchez MD      Current Meds    Infusion Meds      ALLERGIES:    No Known Allergies                  Physical Exam:     /69 (BP Location: Left arm)  Pulse 142  Temp 98.6  F (37  C) (Oral)  Resp 18  Wt (!) 177.9 kg (392 lb 1.6 oz)  SpO2 95%  BMI 53.18 kg/m2      Wt Readings from Last 4 Encounters:   06/04/18 (!) 177.9 kg (392 lb 1.6 oz)   05/21/18 (!) 174.9 kg (385 lb 9.6 oz)   04/27/18 (!) 173.7 kg (383 lb)   04/26/18 (!) 173.7 kg (383 lb)         Intake/Output Summary (Last 24 hours) at 06/04/18 0621  Last data filed  at 06/04/18 1800   Gross per 24 hour   Intake                0 ml   Output              775 ml   Net             -775 ml       General: NAD, resting comfortably on exam, appears stated age,pleasant and cooperative, AAOx3.  HEENT: NC/AT, well injected Conjunctiva, anicteric sclera, EOMI; PERRL, MMM  Neck: Trachea midline; supple, negative for Lymphoadenopathy, JVD to midneck   Chest: CTAB B/L ; no rales  CV: RRR; nl S1/S2, no murmurs  Abd: Soft, NT, Distented , no HSM, (+) BS  Ext: Warm/well perfused; trace LE Edema.  Skin:  0.5 cm bruise on right 1st toe   Neuro: - MS: AAO x3 - no focal deficit on exam             Data:     DIAGNOSTIC STUDIES    CMP    Recent Labs  Lab 06/04/18  1853      POTASSIUM 4.9   CHLORIDE 104   CO2 30   ANIONGAP 7   GLC 91   BUN 14   CR 0.94   GFRESTIMATED 80   GFRESTBLACK >90   CHERI 9.2   MAG 2.0   PROTTOTAL 6.8   ALBUMIN 3.3*   BILITOTAL 1.1   ALKPHOS 79   AST 16   ALT 22     CBC    Recent Labs  Lab 06/04/18  1853   WBC 9.3   RBC 4.37*   HGB 13.5   HCT 40.8   MCV 93   MCH 30.9   MCHC 33.1   RDW 13.7        INR    Recent Labs  Lab 06/04/18  1957   INR 2.03*         EKG:  A - fib / T-wave inversion in V1 (unchanged from prior)     Echo 5/18:  Technically difficult study. The patient's rhythm is atrial fibrillation.  Global and regional left ventricular function is normal with an EF of 55-60%.  Global right ventricular function is mildly reduced.  No significant valvular abnormalities were noted.  Dilation of the inferior vena cava is present with normal respiratory  variation in diameter.Estimated mean right atrial pressure is 8 mmHg.  No pericardial effusion is present.      Assessment and Plan:     Clarke Moreira is a 68 year old with a PMH that is pertinent for HFpEF , A. Fib s/p ablation 2008 , THONG, HTN , HLD, CAD ?? , PAD , hypothyrodis , DM II , MDD/anxiety who is presenting for possible HFpEF exacerbation and possible Right/LEft heart cath.    #HFpEF exacerbation    Possibly dietary indiscretion / life style. had 10 pound weight gain (dry weight 383 lb).  Has significant risk factors for CAD (HTN, DM , PAD) ---> plan for LHC    No evidence of ACS / A.fib controlled/ CR at bas line / HTN controlled / no new drugs / TSH 3.6 (3/2018) / no evidence of PE - COPD.    Will diurese with IV Lasix 80 mg TID (home dose 80/60)     - Lasix 80 mg IV TID  - ACE/ARB: on Lisinopril (no benefit in HFpEF)  - BB : on Metoprolol (no benefit in HFpEF)   - PTA Spironolactone 25 mg qhs   - Plan for RHC/LHC during this hospital stay   - Continue PTA KCL 20 meq BID   - Daily weight / Strict I&Os    #A.fib s/p ablation 2008 with recurrence   CHADSVASC 5  - Continue Metoprolol 100 mg qday   - Hold warfarin plan for RHC/LHC  (got 12.5 mg on admission , will hold any further doses)   - Daily INR     #HTN   Continue PTA lisinopril 5 mg BID     #DM II  Hold Metformin   - SSI     #HLD   Atorvastatin 40 mg Qhs     #THONG   Continue CPAP Qhs     #Hypothyrodism   Continue PTA levothyroxine 175 mcg    Prophylaxis:  DVT: on warfarin GI: Not indicated   Family: Not updated   Disposition: Plan for RHC/LHC + Diuresis - expect 3-4 days     Code Status: DNR/DNI (okay with reversing for procedure)     Patient to be staffed in the AM.     Harsha Gamez MD  Internal Medicine, PGY-2  Pager [phone]

## 2018-06-05 ENCOUNTER — APPOINTMENT (OUTPATIENT)
Dept: OCCUPATIONAL THERAPY | Facility: CLINIC | Age: 68
DRG: 287 | End: 2018-06-05
Attending: INTERNAL MEDICINE
Payer: COMMERCIAL

## 2018-06-05 LAB
ANION GAP SERPL CALCULATED.3IONS-SCNC: 8 MMOL/L (ref 3–14)
BUN SERPL-MCNC: 15 MG/DL (ref 7–30)
CALCIUM SERPL-MCNC: 8.9 MG/DL (ref 8.5–10.1)
CHLORIDE SERPL-SCNC: 100 MMOL/L (ref 94–109)
CO2 SERPL-SCNC: 31 MMOL/L (ref 20–32)
CREAT SERPL-MCNC: 0.85 MG/DL (ref 0.66–1.25)
GFR SERPL CREATININE-BSD FRML MDRD: 90 ML/MIN/1.7M2
GLUCOSE BLDC GLUCOMTR-MCNC: 112 MG/DL (ref 70–99)
GLUCOSE BLDC GLUCOMTR-MCNC: 122 MG/DL (ref 70–99)
GLUCOSE BLDC GLUCOMTR-MCNC: 125 MG/DL (ref 70–99)
GLUCOSE BLDC GLUCOMTR-MCNC: 96 MG/DL (ref 70–99)
GLUCOSE SERPL-MCNC: 117 MG/DL (ref 70–99)
INR PPP: 1.81 (ref 0.86–1.14)
INTERPRETATION ECG - MUSE: NORMAL
MAGNESIUM SERPL-MCNC: 2.2 MG/DL (ref 1.6–2.3)
POTASSIUM SERPL-SCNC: 3.5 MMOL/L (ref 3.4–5.3)
SODIUM SERPL-SCNC: 139 MMOL/L (ref 133–144)

## 2018-06-05 PROCEDURE — 40000133 ZZH STATISTIC OT WARD VISIT

## 2018-06-05 PROCEDURE — 99233 SBSQ HOSP IP/OBS HIGH 50: CPT | Mod: GC | Performed by: INTERNAL MEDICINE

## 2018-06-05 PROCEDURE — 97165 OT EVAL LOW COMPLEX 30 MIN: CPT | Mod: GO

## 2018-06-05 PROCEDURE — 36415 COLL VENOUS BLD VENIPUNCTURE: CPT | Performed by: STUDENT IN AN ORGANIZED HEALTH CARE EDUCATION/TRAINING PROGRAM

## 2018-06-05 PROCEDURE — 00000146 ZZHCL STATISTIC GLUCOSE BY METER IP

## 2018-06-05 PROCEDURE — 85610 PROTHROMBIN TIME: CPT | Performed by: STUDENT IN AN ORGANIZED HEALTH CARE EDUCATION/TRAINING PROGRAM

## 2018-06-05 PROCEDURE — 80048 BASIC METABOLIC PNL TOTAL CA: CPT | Performed by: STUDENT IN AN ORGANIZED HEALTH CARE EDUCATION/TRAINING PROGRAM

## 2018-06-05 PROCEDURE — 25000132 ZZH RX MED GY IP 250 OP 250 PS 637: Performed by: STUDENT IN AN ORGANIZED HEALTH CARE EDUCATION/TRAINING PROGRAM

## 2018-06-05 PROCEDURE — 25000132 ZZH RX MED GY IP 250 OP 250 PS 637: Performed by: INTERNAL MEDICINE

## 2018-06-05 PROCEDURE — 25000128 H RX IP 250 OP 636: Performed by: STUDENT IN AN ORGANIZED HEALTH CARE EDUCATION/TRAINING PROGRAM

## 2018-06-05 PROCEDURE — 83735 ASSAY OF MAGNESIUM: CPT | Performed by: STUDENT IN AN ORGANIZED HEALTH CARE EDUCATION/TRAINING PROGRAM

## 2018-06-05 PROCEDURE — 21400006 ZZH R&B CCU INTERMEDIATE UMMC

## 2018-06-05 PROCEDURE — 97535 SELF CARE MNGMENT TRAINING: CPT | Mod: GO

## 2018-06-05 RX ORDER — POTASSIUM CHLORIDE 1.5 G/1.58G
20-40 POWDER, FOR SOLUTION ORAL
Status: DISCONTINUED | OUTPATIENT
Start: 2018-06-05 | End: 2018-06-08 | Stop reason: HOSPADM

## 2018-06-05 RX ORDER — POTASSIUM CHLORIDE 750 MG/1
20-40 TABLET, EXTENDED RELEASE ORAL
Status: DISCONTINUED | OUTPATIENT
Start: 2018-06-05 | End: 2018-06-08 | Stop reason: HOSPADM

## 2018-06-05 RX ORDER — POTASSIUM CHLORIDE 29.8 MG/ML
20 INJECTION INTRAVENOUS
Status: DISCONTINUED | OUTPATIENT
Start: 2018-06-05 | End: 2018-06-08 | Stop reason: HOSPADM

## 2018-06-05 RX ORDER — POTASSIUM CHLORIDE 7.45 MG/ML
10 INJECTION INTRAVENOUS
Status: DISCONTINUED | OUTPATIENT
Start: 2018-06-05 | End: 2018-06-08 | Stop reason: HOSPADM

## 2018-06-05 RX ORDER — POTASSIUM CL/LIDO/0.9 % NACL 10MEQ/0.1L
10 INTRAVENOUS SOLUTION, PIGGYBACK (ML) INTRAVENOUS
Status: DISCONTINUED | OUTPATIENT
Start: 2018-06-05 | End: 2018-06-08 | Stop reason: HOSPADM

## 2018-06-05 RX ADMIN — POTASSIUM CHLORIDE 20 MEQ: 750 TABLET, EXTENDED RELEASE ORAL at 18:02

## 2018-06-05 RX ADMIN — LISINOPRIL 5 MG: 5 TABLET ORAL at 10:27

## 2018-06-05 RX ADMIN — TAMSULOSIN HYDROCHLORIDE 0.4 MG: 0.4 CAPSULE ORAL at 10:27

## 2018-06-05 RX ADMIN — Medication 1 G: at 10:27

## 2018-06-05 RX ADMIN — ATORVASTATIN CALCIUM 40 MG: 40 TABLET ORAL at 19:25

## 2018-06-05 RX ADMIN — ASPIRIN 81 MG: 81 TABLET, COATED ORAL at 19:25

## 2018-06-05 RX ADMIN — POTASSIUM CHLORIDE 20 MEQ: 750 TABLET, EXTENDED RELEASE ORAL at 10:28

## 2018-06-05 RX ADMIN — FUROSEMIDE 80 MG: 10 INJECTION, SOLUTION INTRAVENOUS at 15:10

## 2018-06-05 RX ADMIN — METOPROLOL SUCCINATE 100 MG: 100 TABLET, EXTENDED RELEASE ORAL at 19:25

## 2018-06-05 RX ADMIN — SPIRONOLACTONE 25 MG: 25 TABLET ORAL at 19:25

## 2018-06-05 RX ADMIN — MULTIPLE VITAMINS W/ MINERALS TAB 1 TABLET: TAB at 10:28

## 2018-06-05 RX ADMIN — LISINOPRIL 5 MG: 5 TABLET ORAL at 19:26

## 2018-06-05 RX ADMIN — FUROSEMIDE 80 MG: 10 INJECTION, SOLUTION INTRAVENOUS at 10:28

## 2018-06-05 RX ADMIN — LEVOTHYROXINE SODIUM 175 MCG: 25 TABLET ORAL at 19:25

## 2018-06-05 RX ADMIN — FUROSEMIDE 80 MG: 10 INJECTION, SOLUTION INTRAVENOUS at 23:23

## 2018-06-05 ASSESSMENT — PAIN DESCRIPTION - DESCRIPTORS: DESCRIPTORS: ACHING

## 2018-06-05 ASSESSMENT — ACTIVITIES OF DAILY LIVING (ADL): PREVIOUS_RESPONSIBILITIES: MEAL PREP;HOUSEKEEPING;LAUNDRY;SHOPPING;MEDICATION MANAGEMENT;FINANCES;DRIVING

## 2018-06-05 NOTE — PHARMACY-ANTICOAGULATION SERVICE
Clinical Pharmacy - Warfarin Dosing Consult     Pharmacy has been consulted to manage this patient s warfarin therapy.  Indication: Atrial Fibrillation  Therapy Goal: INR 2-3  Warfarin Prior to Admission: Yes  Warfarin PTA Regimen: 12.5 mg daily  Dose Comments: Pt took 10 mg on 6/1, 6/2 and 6/3 per clinic instructions.     INR   Date Value Ref Range Status   06/04/2018 2.03 (H) 0.86 - 1.14 Final   05/21/2018 2.8  Final     Comment:     Adult Normal Range: 0.90 - 1.10  Therapeutic Range: 2.0 - 3.5         Recommend warfarin 12.5 mg today.  Pharmacy will monitor Clarke Moreira daily and order warfarin doses to achieve specified goal.      Please contact pharmacy as soon as possible if the warfarin needs to be held for a procedure or if the warfarin goals change.

## 2018-06-05 NOTE — PROGRESS NOTES
[facility] Cardiology Progress Note           Assessment and Plan:   Clarke Moreira is a 68 year old with a PMH that is pertinent for HFpEF , Atrial Fibrillation  s/p ablation 2008 , THONG, HTN , HLD, PAD , hypothyrodism, DM II , MDD/anxiety who is presenting for RHC and Coronary Angiogram.     #HFpEF exacerbation   -HFpEF likely 2/2 HTN, HLD, DM2, THONG. Etiology for exacerbation is unclear but likely slow progression of disease process.   -Patient is still volume overloaded on examination; therefore, will plan to continue Lasix 80mg IV TID.   -Continue Aldactone, Metoprolol, Lisinopril 5mg BID.  -Per CORE clinic recommendations, will plan for RHC and Coronary Angiogram. Patient has had atypical symptoms of chest pain; however, he does have many risk factors for CAD.     #A.fib s/p Ablation 2008 with recurrence   CHADSVASC 5  - Continue Metoprolol 100 mg qday   - Hold warfarin plan for RHC/LHC  (got 12.5 mg on admission , will hold any further doses)   - Daily INR      #HTN   -Continue medications as above    #DM II  Hold Metformin   - SSI      #HLD   Atorvastatin 40 mg Qhs      #THONG   Continue CPAP Qhs      #Hypothyrodism   Continue PTA levothyroxine 175 mcg     Prophylaxis:  DVT: on warfarin GI: Not indicated     Code Status: DNR/DNI (okay with reversing for procedure)         Zonia Concepcion PGY-4   Cardiology Fellow   Pager: [phone]       Subjective:   No acute events overnight. Patient states his legs have improved since starting IV diuresis.       Intake/Output Summary (Last 24 hours) at 06/05/18 1249  Last data filed at 06/05/18 0600   Gross per 24 hour   Intake              350 ml   Output             4125 ml   Net            -3775 ml       Weight: 392 lb to 379 lb.           Review of Systems:   A comprehensive review of systems was performed and found to be negative except as described in this note          Medications:   Cardiac Medications:   ASA   Lipitor 40mg   Lasix 80mg x1gkmft   Lisinopril  5mg BID   Metoprolol 100mg   Aldactone 25mg   Warfarin 12.5mg     Current Facility-Administered Medications   Medication     aspirin EC tablet 81 mg     atorvastatin (LIPITOR) tablet 40 mg     Continuing ACE inhibitor/ARB/ARNI from home medication list OR ACE inhibitor/ARB/ARNI order already placed during this visit     Continuing beta blocker from home medication list OR beta blocker order already placed during this visit     glucose gel 15-30 g    Or     dextrose 50 % injection 25-50 mL    Or     glucagon injection 1 mg     fish oil-omega-3 fatty acids capsule 1 g     furosemide (LASIX) injection 80 mg     HOLD nitroGLYcerin IF     insulin aspart (NovoLOG) inj (RAPID ACTING)     insulin aspart (NovoLOG) inj (RAPID ACTING)     levothyroxine (SYNTHROID/LEVOTHROID) tablet 175 mcg     lisinopril (PRINIVIL/ZESTRIL) tablet 5 mg     magnesium sulfate 4 g in 100 mL sterile water (premade)     metoprolol succinate (TOPROL-XL) 24 hr tablet 100 mg     multivitamin, therapeutic with minerals (THERA-VIT-M) tablet 1 tablet     polyethylene glycol (MIRALAX/GLYCOLAX) Packet 17 g     potassium chloride (KLOR-CON) Packet 20-40 mEq     potassium chloride 10 mEq in 100 mL intermittent infusion with 10 mg lidocaine     potassium chloride 10 mEq in 100 mL sterile water intermittent infusion (premix)     potassium chloride 20 mEq in 50 mL intermittent infusion     potassium chloride SA (K-DUR/KLOR-CON M) CR tablet 20 mEq     potassium chloride SA (K-DUR/KLOR-CON M) CR tablet 20-40 mEq     senna-docusate (SENOKOT-S;PERICOLACE) 8.6-50 MG per tablet 1-2 tablet     spironolactone (ALDACTONE) tablet 25 mg     tamsulosin (FLOMAX) capsule 0.4 mg     Warfarin Therapy Reminder (Check START DATE - warfarin may be starting in the FUTURE)               Objective:   Temp: 97.9  F (36.6  C) Temp src: Oral BP: 116/77 Pulse: 122 Heart Rate: 83 Resp: 16 SpO2: 97 % O2 Device: Nasal cannula Oxygen Delivery: 2 LPM    Gen: NAD   HEENT: NC/AT   CV: RRR, JVP  up to mid neck with CVP  Estimated around 15   Pulm: CTAB   GI: s/nt/nd   Ext: Mild Edema           Data:     Lab Results   Component Value Date    WBC 9.3 06/04/2018    HGB 13.5 06/04/2018    HCT 40.8 06/04/2018     06/04/2018     06/05/2018    POTASSIUM 3.5 06/05/2018    CHLORIDE 100 06/05/2018    CO2 31 06/05/2018    BUN 15 06/05/2018    CR 0.85 06/05/2018     (H) 06/05/2018    SED 5 02/26/2008    DD 0.8 (H) 09/23/2017    NTBNPI 1621 (H) 06/04/2018    NTBNP 1443 (H) 05/21/2018    TROPONIN 0.00 09/23/2017    TROPI <0.015 09/24/2017    AST 16 06/04/2018    ALT 22 06/04/2018    ALKPHOS 79 06/04/2018    BILITOTAL 1.1 06/04/2018    INR 1.81 (H) 06/05/2018

## 2018-06-05 NOTE — PROGRESS NOTES
06/05/18 1024   Quick Adds   Type of Visit Initial Occupational Therapy Evaluation   Living Environment   Lives With alone   Living Arrangements apartment   Home Accessibility bed and bath on same level;tub/shower is not walk in   Number of Stairs to Enter Home 0   Number of Stairs Within Home 0   Transportation Available car   Living Environment Comment Pt lives alone in 1 bedroom apartment on 16th floor, has elevator access. Has tub shower with grab bar on lip of tub, however has been completing sponge baths.    Self-Care   Dominant Hand right   Usual Activity Tolerance moderate   Current Activity Tolerance fair   Regular Exercise yes   Activity/Exercise Type other (see comments)  (OT services 1-2x/week)   Exercise Amount/Frequency other (see comments)  (1-2x/week)   Equipment Currently Used at Home walker, rolling   Activity/Exercise/Self-Care Comment Pt receives OT services 1-2x/week at home. Was previously receiving lymphedema services but was discharged.   Functional Level Prior   Ambulation 1-->assistive equipment   Transferring 1-->assistive equipment   Toileting 1-->assistive equipment   Bathing 1-->assistive equipment   Dressing 1-->assistive equipment   Eating 0-->independent   Communication 0-->understands/communicates without difficulty   Swallowing 0-->swallows foods/liquids without difficulty   Cognition 0 - no cognition issues reported   Fall history within last six months yes   Number of times patient has fallen within last six months 2   Which of the above functional risks had a recent onset or change? none   Prior Functional Level Comment Pt previously ind/mod ind with all ADLs/IADLs. Currently completing sponge baths due to difficulty with tub transfers.   General Information   Onset of Illness/Injury or Date of Surgery - Date 06/04/18   Referring Physician David Wood MD   Patient/Family Goals Statement To return home and resume OT/edema services   Additional Occupational Profile  Info/Pertinent History of Current Problem Clarke Moreira is a 68 year old with a PMH that is pertinent for HFpEF , A. Fib s/p ablation 2008 , THONG, HTN , HLD, CAD ?? , PAD , hypothyrodis , DM II , MDD/anxiety who is presenting for possible HFpEF exacerbation and possible Right/LEft heart cath.     Precautions/Limitations oxygen therapy device and L/min  (Normally on 2L O2 at baseline)   Weight-Bearing Status - LUE full weight-bearing   Weight-Bearing Status - RUE full weight-bearing   Weight-Bearing Status - LLE full weight-bearing   Weight-Bearing Status - RLE full weight-bearing   Heart Disease Risk Factors Age;Gender;Medical history;Stress;Overweight;Lack of physical activity   General Observations Pt seated upright upon arrival, pleasant, agreeable to OT   General Info Comments Up ad charity   Cognitive Status Examination   Orientation orientation to person, place and time   Level of Consciousness alert   Able to Follow Commands WNL/WFL   Personal Safety (Cognitive) WNL/WFL   Memory intact   Attention No deficits were identified   Visual Perception   Visual Perception Wears glasses   Sensory Examination   Sensory Quick Adds No deficits were identified   Pain Assessment   Patient Currently in Pain No   Integumentary/Edema   Integumentary/Edema other (describe)   Integumentary/Edema Comments Pt with edema at baseline, pt presenting with wraps from , reports loosening and ill fit, will benefit from lymphedema consult   Range of Motion (ROM)   ROM Quick Adds Other (describe)   ROM Comment Limited due to hyupervolemia and decreased flexibility   Strength   Manual Muscle Testing Quick Adds Other   Strength Comments Pt with fair strength however presenting with deconditioning and decreased activity tolerance   Hand Strength   Hand Strength Comments WFL   Muscle Tone Assessment   Muscle Tone Quick Adds No deficits were identified   Coordination   Upper Extremity Coordination No deficits were identified   Mobility   Bed  "Mobility Comments SBA   Transfer Skill: Sit to Stand   Level of [place]: Sit/Stand stand-by assist   Lower Body Dressing   Level of [place]: Dress Lower Body minimum assist (75% patients effort)   Instrumental Activities of Daily Living (IADL)   Previous Responsibilities meal prep;housekeeping;laundry;shopping;medication management;finances;driving   Activities of Daily Living Analysis   Impairments Contributing to Impaired Activities of Daily Living strength decreased  (decreased activity tolerance, edema)   General Therapy Interventions   Planned Therapy Interventions ADL retraining;IADL retraining;ROM;strengthening;home program guidelines;progressive activity/exercise;risk factor education   Clinical Impression   Criteria for Skilled Therapeutic Interventions Met yes, treatment indicated   OT Diagnosis decreased independence in ADLs/IADLs   Influenced by the following impairments decreased strength/activity tolerance, SOB, edema   Assessment of Occupational Performance 1-3 Performance Deficits   Identified Performance Deficits household mobility, bathing, LB dressing   Clinical Decision Making (Complexity) Low complexity   Therapy Frequency daily   Predicted Duration of Therapy Intervention (days/wks) 2 weeks   Anticipated Equipment Needs at Discharge (TBD)   Anticipated Discharge Disposition Home with Home Therapy   Risks and Benefits of Treatment have been explained. Yes   Patient, Family & other staff in agreement with plan of care Yes   [facility] TM \"6 Clicks\"   2016, Trustees of [facility], under license to [employer].  All rights reserved.   6 Clicks Short Forms Daily Activity Inpatient Short Form   [facility]  \"6 Clicks\" Daily Activity Inpatient Short Form   1. Putting on and taking off regular lower body clothing? 3 - A Little   2. Bathing (including washing, rinsing, drying)? 3 - A Little   3. Toileting, which includes using toilet, bedpan or urinal? 3 - A " Little   4. Putting on and taking off regular upper body clothing? 4 - None   5. Taking care of personal grooming such as brushing teeth? 4 - None   6. Eating meals? 4 - None   Daily Activity Raw Score (Score out of 24.Lower scores equate to lower levels of function) 21   Total Evaluation Time   Total Evaluation Time (Minutes) 6

## 2018-06-05 NOTE — PROGRESS NOTES
[facility]  Patient is currently open to home care services with [facility].  The patient is currently receiving RN/OT/Lymphedema services.  [facility]  and team have been notified of patient admission.  [facility] liaison will continue to follow patient during stay.  If appropriate provide orders to resume home care at time of discharge.    Thank you  Belkis Solano RN, BSN  [facility] Liaison  [phone]

## 2018-06-05 NOTE — PLAN OF CARE
Problem: Patient Care Overview  Goal: Plan of Care/Patient Progress Review  OT/6C: Attempted pt this AM, pt in shower during time of attempt. Will check back later as schedule allows.

## 2018-06-05 NOTE — PROGRESS NOTES
CLINICAL NUTRITION SERVICES    Reason for Assessment:  Low-sodium (2 g/day) nutrition education, received consult    Diet History:  Pt reports receiving low-sodium nutrition education in the past. Per chart review, pt received nutrition education on protein sources, MyPlate, and heart healthy eating on 9/28/17. Pt has received nutrition education on protein and kcal requirements. Pt states he has made healthy changes to his diet. He does not add salt and has tried seasonings, such as True Lemon seasoning (and other True seasonings) on [employer]. States he has been cooking on his own to avoid processed foods.     Nutrition Diagnosis:  No nutrition education dx at this time.    Nutrition Prescription/Recs:  Low-sodium diet. Fluid restriction as per team.      Interventions:  Nutrition Education: Encouraged pt to continue to follow low-sodium diet and fluid restriction.      Goals:    Pt will verbalize at least five high sodium foods and the importance of avoiding added salt to foods for cooking or seasoning foods.     Follow-up:   Patient to ask any further nutrition-related questions before discharge. In addition, pt may request outpatient RD appointment.     Marilee Mario, MS, RD, LD, [facility]   6C Pgr: [phone]

## 2018-06-05 NOTE — PLAN OF CARE
Problem: Patient Care Overview  Goal: Plan of Care/Patient Progress Review  Discharge Planner OT   Patient plan for discharge: Home with continued HH OT and resumption of  lymphedema services  Current status: Pt recently completed shower, endorsing fatigue. Agreeable to functional transfers and mobility in room with FWW, pt SBA for all transfers, in room mobility, and bed mobility. Pt required min A for LB dressing due to fatigue, able to demonstrate SBA on LLE to complete application of personal compression stockings.   Barriers to return to prior living situation: decreased strength/activity tolerance, edema  Recommendations for discharge: Home with continued HH OT and resumption of  lymphedema services  Rationale for recommendations: Pt is near functional baseline, demonstrates safety with transfers and mobility with AD. Anticipate pt will be safe to discharge home with continued therapy to address remaining deficits.        Entered by: Desi Martinez 06/05/2018 10:34 AM

## 2018-06-05 NOTE — PLAN OF CARE
Problem: Patient Care Overview  Goal: Plan of Care/Patient Progress Review  Outcome: Improving  D: Pt admitted w/ HF exacerbation.  I: 80 mg IV lasix x1.  NPO at midnight.  Med rec completed.  12.5 mg coumadin.  2 L fluid restrict.  2 LPM NC while sleeping.  EKG completed.  A: Pt endorses pain in L toe d/t ingrown toenail, declines intervention.  VSS, see flowsheet.  AFib.  Voiding well in urinal after IV lasix.  SBA w/ walker.  Occ sleeping b/w cares, hx of being unable to tolerate hospital BiPAP so using 2 LPM NC overnight.  INR 2.03.  P: Continue to monitor and notify MDs as necessary of pertinent pt condition changes.  Poss RHC/LHC today.

## 2018-06-05 NOTE — PLAN OF CARE
Problem: Patient Care Overview  Goal: Plan of Care/Patient Progress Review  Outcome: No Change  Admission    Diagnosis: HF exacerbation  Belongings: Placed in closet; valuables sent home with family, declined sending any items to security.  Admission Profile/Med Rec: Complete  Teaching: orientation to unit, call don't fall, use of console, meal times, visiting hours, when to call for the RN (angina/sob/dizzyness, etc.), and enforced importance of safety   Access: PIV  Telemetry: Placed on patient  Height/Weight: Complete

## 2018-06-06 ENCOUNTER — APPOINTMENT (OUTPATIENT)
Dept: CARDIOLOGY | Facility: CLINIC | Age: 68
DRG: 287 | End: 2018-06-06
Attending: INTERNAL MEDICINE
Payer: COMMERCIAL

## 2018-06-06 LAB
ANION GAP SERPL CALCULATED.3IONS-SCNC: 5 MMOL/L (ref 3–14)
ANION GAP SERPL CALCULATED.3IONS-SCNC: 7 MMOL/L (ref 3–14)
BUN SERPL-MCNC: 16 MG/DL (ref 7–30)
BUN SERPL-MCNC: 18 MG/DL (ref 7–30)
CALCIUM SERPL-MCNC: 8.8 MG/DL (ref 8.5–10.1)
CALCIUM SERPL-MCNC: 9 MG/DL (ref 8.5–10.1)
CHLORIDE SERPL-SCNC: 100 MMOL/L (ref 94–109)
CHLORIDE SERPL-SCNC: 99 MMOL/L (ref 94–109)
CO2 SERPL-SCNC: 30 MMOL/L (ref 20–32)
CO2 SERPL-SCNC: 32 MMOL/L (ref 20–32)
CREAT SERPL-MCNC: 0.92 MG/DL (ref 0.66–1.25)
CREAT SERPL-MCNC: 0.96 MG/DL (ref 0.66–1.25)
GFR SERPL CREATININE-BSD FRML MDRD: 78 ML/MIN/1.7M2
GFR SERPL CREATININE-BSD FRML MDRD: 82 ML/MIN/1.7M2
GLUCOSE BLDC GLUCOMTR-MCNC: 102 MG/DL (ref 70–99)
GLUCOSE BLDC GLUCOMTR-MCNC: 111 MG/DL (ref 70–99)
GLUCOSE BLDC GLUCOMTR-MCNC: 116 MG/DL (ref 70–99)
GLUCOSE BLDC GLUCOMTR-MCNC: 120 MG/DL (ref 70–99)
GLUCOSE BLDC GLUCOMTR-MCNC: 125 MG/DL (ref 70–99)
GLUCOSE SERPL-MCNC: 123 MG/DL (ref 70–99)
GLUCOSE SERPL-MCNC: 132 MG/DL (ref 70–99)
INR PPP: 1.65 (ref 0.86–1.14)
MAGNESIUM SERPL-MCNC: 2.2 MG/DL (ref 1.6–2.3)
POTASSIUM SERPL-SCNC: 3.4 MMOL/L (ref 3.4–5.3)
POTASSIUM SERPL-SCNC: 3.7 MMOL/L (ref 3.4–5.3)
SODIUM SERPL-SCNC: 136 MMOL/L (ref 133–144)
SODIUM SERPL-SCNC: 136 MMOL/L (ref 133–144)

## 2018-06-06 PROCEDURE — 27210946 ZZH KIT HC TOTES DISP CR8

## 2018-06-06 PROCEDURE — 83735 ASSAY OF MAGNESIUM: CPT | Performed by: STUDENT IN AN ORGANIZED HEALTH CARE EDUCATION/TRAINING PROGRAM

## 2018-06-06 PROCEDURE — 25000132 ZZH RX MED GY IP 250 OP 250 PS 637: Performed by: STUDENT IN AN ORGANIZED HEALTH CARE EDUCATION/TRAINING PROGRAM

## 2018-06-06 PROCEDURE — 00000146 ZZHCL STATISTIC GLUCOSE BY METER IP

## 2018-06-06 PROCEDURE — C1894 INTRO/SHEATH, NON-LASER: HCPCS

## 2018-06-06 PROCEDURE — B2111ZZ FLUOROSCOPY OF MULTIPLE CORONARY ARTERIES USING LOW OSMOLAR CONTRAST: ICD-10-PCS | Performed by: INTERNAL MEDICINE

## 2018-06-06 PROCEDURE — 4A023N6 MEASUREMENT OF CARDIAC SAMPLING AND PRESSURE, RIGHT HEART, PERCUTANEOUS APPROACH: ICD-10-PCS | Performed by: INTERNAL MEDICINE

## 2018-06-06 PROCEDURE — 27210762 ZZH DEVICE SUTURELESS SECUREMENT EA CR2

## 2018-06-06 PROCEDURE — 25000132 ZZH RX MED GY IP 250 OP 250 PS 637: Performed by: INTERNAL MEDICINE

## 2018-06-06 PROCEDURE — 93456 R HRT CORONARY ARTERY ANGIO: CPT | Mod: 26 | Performed by: INTERNAL MEDICINE

## 2018-06-06 PROCEDURE — 80048 BASIC METABOLIC PNL TOTAL CA: CPT | Performed by: STUDENT IN AN ORGANIZED HEALTH CARE EDUCATION/TRAINING PROGRAM

## 2018-06-06 PROCEDURE — 36415 COLL VENOUS BLD VENIPUNCTURE: CPT | Performed by: STUDENT IN AN ORGANIZED HEALTH CARE EDUCATION/TRAINING PROGRAM

## 2018-06-06 PROCEDURE — 25000128 H RX IP 250 OP 636: Performed by: INTERNAL MEDICINE

## 2018-06-06 PROCEDURE — 99153 MOD SED SAME PHYS/QHP EA: CPT

## 2018-06-06 PROCEDURE — 27210787 ZZH MANIFOLD CR2

## 2018-06-06 PROCEDURE — 27211181 ZZH BALLOON TIP PRESSURE CR5

## 2018-06-06 PROCEDURE — 27210843 ZZH DEVICE COMPRESSION CR12

## 2018-06-06 PROCEDURE — 99233 SBSQ HOSP IP/OBS HIGH 50: CPT | Mod: 25 | Performed by: INTERNAL MEDICINE

## 2018-06-06 PROCEDURE — 99152 MOD SED SAME PHYS/QHP 5/>YRS: CPT

## 2018-06-06 PROCEDURE — 93456 R HRT CORONARY ARTERY ANGIO: CPT

## 2018-06-06 PROCEDURE — 25000128 H RX IP 250 OP 636: Performed by: STUDENT IN AN ORGANIZED HEALTH CARE EDUCATION/TRAINING PROGRAM

## 2018-06-06 PROCEDURE — 27210893 ZZH CATH CR5

## 2018-06-06 PROCEDURE — 21400006 ZZH R&B CCU INTERMEDIATE UMMC

## 2018-06-06 PROCEDURE — 85610 PROTHROMBIN TIME: CPT | Performed by: STUDENT IN AN ORGANIZED HEALTH CARE EDUCATION/TRAINING PROGRAM

## 2018-06-06 PROCEDURE — 27211089 ZZH KIT ACIST INJECTOR CR3

## 2018-06-06 PROCEDURE — 25000125 ZZHC RX 250: Performed by: INTERNAL MEDICINE

## 2018-06-06 RX ORDER — HYDRALAZINE HYDROCHLORIDE 20 MG/ML
10-20 INJECTION INTRAMUSCULAR; INTRAVENOUS
Status: DISCONTINUED | OUTPATIENT
Start: 2018-06-06 | End: 2018-06-06 | Stop reason: HOSPADM

## 2018-06-06 RX ORDER — NITROGLYCERIN 20 MG/100ML
.07-1.19 INJECTION INTRAVENOUS CONTINUOUS PRN
Status: DISCONTINUED | OUTPATIENT
Start: 2018-06-06 | End: 2018-06-06 | Stop reason: HOSPADM

## 2018-06-06 RX ORDER — TORSEMIDE 20 MG/1
20 TABLET ORAL DAILY
Status: DISCONTINUED | OUTPATIENT
Start: 2018-06-06 | End: 2018-06-06

## 2018-06-06 RX ORDER — PROTAMINE SULFATE 10 MG/ML
1-5 INJECTION, SOLUTION INTRAVENOUS
Status: DISCONTINUED | OUTPATIENT
Start: 2018-06-06 | End: 2018-06-06 | Stop reason: HOSPADM

## 2018-06-06 RX ORDER — FLUMAZENIL 0.1 MG/ML
0.2 INJECTION, SOLUTION INTRAVENOUS
Status: ACTIVE | OUTPATIENT
Start: 2018-06-06 | End: 2018-06-07

## 2018-06-06 RX ORDER — ATROPINE SULFATE 0.1 MG/ML
0.5 INJECTION INTRAVENOUS EVERY 5 MIN PRN
Status: ACTIVE | OUTPATIENT
Start: 2018-06-06 | End: 2018-06-07

## 2018-06-06 RX ORDER — SODIUM NITROPRUSSIDE 25 MG/ML
100-200 INJECTION INTRAVENOUS
Status: DISCONTINUED | OUTPATIENT
Start: 2018-06-06 | End: 2018-06-06 | Stop reason: HOSPADM

## 2018-06-06 RX ORDER — CLOPIDOGREL BISULFATE 75 MG/1
300-600 TABLET ORAL
Status: DISCONTINUED | OUTPATIENT
Start: 2018-06-06 | End: 2018-06-06 | Stop reason: HOSPADM

## 2018-06-06 RX ORDER — CLOPIDOGREL BISULFATE 75 MG/1
75 TABLET ORAL
Status: DISCONTINUED | OUTPATIENT
Start: 2018-06-06 | End: 2018-06-06 | Stop reason: HOSPADM

## 2018-06-06 RX ORDER — NALOXONE HYDROCHLORIDE 0.4 MG/ML
.2-.4 INJECTION, SOLUTION INTRAMUSCULAR; INTRAVENOUS; SUBCUTANEOUS
Status: ACTIVE | OUTPATIENT
Start: 2018-06-06 | End: 2018-06-07

## 2018-06-06 RX ORDER — LORAZEPAM 2 MG/ML
.5-2 INJECTION INTRAMUSCULAR EVERY 4 HOURS PRN
Status: DISCONTINUED | OUTPATIENT
Start: 2018-06-06 | End: 2018-06-06 | Stop reason: HOSPADM

## 2018-06-06 RX ORDER — DEXTROSE MONOHYDRATE 25 G/50ML
12.5-5 INJECTION, SOLUTION INTRAVENOUS EVERY 30 MIN PRN
Status: DISCONTINUED | OUTPATIENT
Start: 2018-06-06 | End: 2018-06-06 | Stop reason: HOSPADM

## 2018-06-06 RX ORDER — ATROPINE SULFATE 0.1 MG/ML
.5-1 INJECTION INTRAVENOUS
Status: DISCONTINUED | OUTPATIENT
Start: 2018-06-06 | End: 2018-06-06 | Stop reason: HOSPADM

## 2018-06-06 RX ORDER — NITROGLYCERIN 0.4 MG/1
0.4 TABLET SUBLINGUAL EVERY 5 MIN PRN
Status: DISCONTINUED | OUTPATIENT
Start: 2018-06-06 | End: 2018-06-06 | Stop reason: HOSPADM

## 2018-06-06 RX ORDER — NICARDIPINE HYDROCHLORIDE 2.5 MG/ML
100 INJECTION INTRAVENOUS
Status: DISCONTINUED | OUTPATIENT
Start: 2018-06-06 | End: 2018-06-06 | Stop reason: HOSPADM

## 2018-06-06 RX ORDER — ASPIRIN 81 MG/1
81-324 TABLET, CHEWABLE ORAL
Status: DISCONTINUED | OUTPATIENT
Start: 2018-06-06 | End: 2018-06-06 | Stop reason: HOSPADM

## 2018-06-06 RX ORDER — METOPROLOL TARTRATE 1 MG/ML
5 INJECTION, SOLUTION INTRAVENOUS EVERY 5 MIN PRN
Status: DISCONTINUED | OUTPATIENT
Start: 2018-06-06 | End: 2018-06-06 | Stop reason: HOSPADM

## 2018-06-06 RX ORDER — EPTIFIBATIDE 2 MG/ML
15 INJECTION, SOLUTION INTRAVENOUS CONTINUOUS PRN
Status: DISCONTINUED | OUTPATIENT
Start: 2018-06-06 | End: 2018-06-06 | Stop reason: HOSPADM

## 2018-06-06 RX ORDER — LIDOCAINE HYDROCHLORIDE 10 MG/ML
30 INJECTION, SOLUTION EPIDURAL; INFILTRATION; INTRACAUDAL; PERINEURAL
Status: DISCONTINUED | OUTPATIENT
Start: 2018-06-06 | End: 2018-06-06 | Stop reason: HOSPADM

## 2018-06-06 RX ORDER — MAGNESIUM HYDROXIDE 1200 MG/15ML
1000 LIQUID ORAL CONTINUOUS PRN
Status: DISCONTINUED | OUTPATIENT
Start: 2018-06-06 | End: 2018-06-06 | Stop reason: HOSPADM

## 2018-06-06 RX ORDER — POTASSIUM CHLORIDE 7.45 MG/ML
10 INJECTION INTRAVENOUS
Status: DISCONTINUED | OUTPATIENT
Start: 2018-06-06 | End: 2018-06-06 | Stop reason: HOSPADM

## 2018-06-06 RX ORDER — HEPARIN SODIUM 1000 [USP'U]/ML
1000-10000 INJECTION, SOLUTION INTRAVENOUS; SUBCUTANEOUS EVERY 5 MIN PRN
Status: DISCONTINUED | OUTPATIENT
Start: 2018-06-06 | End: 2018-06-06 | Stop reason: HOSPADM

## 2018-06-06 RX ORDER — FENTANYL CITRATE 50 UG/ML
25-50 INJECTION, SOLUTION INTRAMUSCULAR; INTRAVENOUS
Status: DISCONTINUED | OUTPATIENT
Start: 2018-06-06 | End: 2018-06-06 | Stop reason: HOSPADM

## 2018-06-06 RX ORDER — NIFEDIPINE 10 MG/1
10 CAPSULE ORAL
Status: DISCONTINUED | OUTPATIENT
Start: 2018-06-06 | End: 2018-06-06 | Stop reason: HOSPADM

## 2018-06-06 RX ORDER — ARGATROBAN 1 MG/ML
150 INJECTION, SOLUTION INTRAVENOUS
Status: DISCONTINUED | OUTPATIENT
Start: 2018-06-06 | End: 2018-06-06 | Stop reason: HOSPADM

## 2018-06-06 RX ORDER — EPTIFIBATIDE 2 MG/ML
180 INJECTION, SOLUTION INTRAVENOUS EVERY 10 MIN PRN
Status: DISCONTINUED | OUTPATIENT
Start: 2018-06-06 | End: 2018-06-06 | Stop reason: HOSPADM

## 2018-06-06 RX ORDER — FLUMAZENIL 0.1 MG/ML
0.2 INJECTION, SOLUTION INTRAVENOUS
Status: DISCONTINUED | OUTPATIENT
Start: 2018-06-06 | End: 2018-06-06 | Stop reason: HOSPADM

## 2018-06-06 RX ORDER — NITROGLYCERIN 5 MG/ML
100-500 VIAL (ML) INTRAVENOUS
Status: COMPLETED | OUTPATIENT
Start: 2018-06-06 | End: 2018-06-06

## 2018-06-06 RX ORDER — POTASSIUM CHLORIDE 1.5 G/1.58G
20 POWDER, FOR SOLUTION ORAL ONCE
Status: COMPLETED | OUTPATIENT
Start: 2018-06-06 | End: 2018-06-06

## 2018-06-06 RX ORDER — ARGATROBAN 1 MG/ML
350 INJECTION, SOLUTION INTRAVENOUS
Status: DISCONTINUED | OUTPATIENT
Start: 2018-06-06 | End: 2018-06-06 | Stop reason: HOSPADM

## 2018-06-06 RX ORDER — PRASUGREL 10 MG/1
10-60 TABLET, FILM COATED ORAL
Status: DISCONTINUED | OUTPATIENT
Start: 2018-06-06 | End: 2018-06-06 | Stop reason: HOSPADM

## 2018-06-06 RX ORDER — NALOXONE HYDROCHLORIDE 0.4 MG/ML
.1-.4 INJECTION, SOLUTION INTRAMUSCULAR; INTRAVENOUS; SUBCUTANEOUS
Status: DISCONTINUED | OUTPATIENT
Start: 2018-06-06 | End: 2018-06-08 | Stop reason: HOSPADM

## 2018-06-06 RX ORDER — ADENOSINE 3 MG/ML
12-12000 INJECTION, SOLUTION INTRAVENOUS
Status: DISCONTINUED | OUTPATIENT
Start: 2018-06-06 | End: 2018-06-06 | Stop reason: HOSPADM

## 2018-06-06 RX ORDER — NITROGLYCERIN 5 MG/ML
100-200 VIAL (ML) INTRAVENOUS
Status: DISCONTINUED | OUTPATIENT
Start: 2018-06-06 | End: 2018-06-06 | Stop reason: HOSPADM

## 2018-06-06 RX ORDER — SPIRONOLACTONE 50 MG/1
50 TABLET, FILM COATED ORAL EVERY EVENING
Status: DISCONTINUED | OUTPATIENT
Start: 2018-06-06 | End: 2018-06-08 | Stop reason: HOSPADM

## 2018-06-06 RX ORDER — ENALAPRILAT 1.25 MG/ML
1.25-2.5 INJECTION INTRAVENOUS
Status: DISCONTINUED | OUTPATIENT
Start: 2018-06-06 | End: 2018-06-06 | Stop reason: HOSPADM

## 2018-06-06 RX ORDER — ONDANSETRON 2 MG/ML
4 INJECTION INTRAMUSCULAR; INTRAVENOUS EVERY 4 HOURS PRN
Status: DISCONTINUED | OUTPATIENT
Start: 2018-06-06 | End: 2018-06-06 | Stop reason: HOSPADM

## 2018-06-06 RX ORDER — FUROSEMIDE 10 MG/ML
20-100 INJECTION INTRAMUSCULAR; INTRAVENOUS
Status: DISCONTINUED | OUTPATIENT
Start: 2018-06-06 | End: 2018-06-06 | Stop reason: HOSPADM

## 2018-06-06 RX ORDER — NALOXONE HYDROCHLORIDE 0.4 MG/ML
0.4 INJECTION, SOLUTION INTRAMUSCULAR; INTRAVENOUS; SUBCUTANEOUS EVERY 5 MIN PRN
Status: DISCONTINUED | OUTPATIENT
Start: 2018-06-06 | End: 2018-06-06 | Stop reason: HOSPADM

## 2018-06-06 RX ORDER — METHYLPREDNISOLONE SODIUM SUCCINATE 125 MG/2ML
125 INJECTION, POWDER, LYOPHILIZED, FOR SOLUTION INTRAMUSCULAR; INTRAVENOUS
Status: DISCONTINUED | OUTPATIENT
Start: 2018-06-06 | End: 2018-06-06 | Stop reason: HOSPADM

## 2018-06-06 RX ORDER — IOPAMIDOL 755 MG/ML
80 INJECTION, SOLUTION INTRAVASCULAR ONCE
Status: COMPLETED | OUTPATIENT
Start: 2018-06-06 | End: 2018-06-06

## 2018-06-06 RX ORDER — ASPIRIN 325 MG
325 TABLET ORAL
Status: DISCONTINUED | OUTPATIENT
Start: 2018-06-06 | End: 2018-06-06 | Stop reason: HOSPADM

## 2018-06-06 RX ORDER — PROTAMINE SULFATE 10 MG/ML
25-100 INJECTION, SOLUTION INTRAVENOUS EVERY 5 MIN PRN
Status: DISCONTINUED | OUTPATIENT
Start: 2018-06-06 | End: 2018-06-06 | Stop reason: HOSPADM

## 2018-06-06 RX ORDER — DIPHENHYDRAMINE HYDROCHLORIDE 50 MG/ML
25-50 INJECTION INTRAMUSCULAR; INTRAVENOUS
Status: DISCONTINUED | OUTPATIENT
Start: 2018-06-06 | End: 2018-06-06 | Stop reason: HOSPADM

## 2018-06-06 RX ADMIN — POTASSIUM CHLORIDE 20 MEQ: 1.5 POWDER, FOR SOLUTION ORAL at 21:45

## 2018-06-06 RX ADMIN — Medication 500 UNITS: at 16:40

## 2018-06-06 RX ADMIN — METOPROLOL SUCCINATE 100 MG: 100 TABLET, EXTENDED RELEASE ORAL at 20:27

## 2018-06-06 RX ADMIN — FENTANYL CITRATE 50 MCG: 50 INJECTION, SOLUTION INTRAMUSCULAR; INTRAVENOUS at 17:07

## 2018-06-06 RX ADMIN — ASPIRIN 81 MG: 81 TABLET, COATED ORAL at 20:26

## 2018-06-06 RX ADMIN — SPIRONOLACTONE 50 MG: 50 TABLET ORAL at 20:26

## 2018-06-06 RX ADMIN — Medication 1500 UNITS: at 16:40

## 2018-06-06 RX ADMIN — TAMSULOSIN HYDROCHLORIDE 0.4 MG: 0.4 CAPSULE ORAL at 08:43

## 2018-06-06 RX ADMIN — PHENYLEPHRINE HYDROCHLORIDE 100 MCG: 10 INJECTION, SOLUTION INTRAMUSCULAR; INTRAVENOUS; SUBCUTANEOUS at 16:58

## 2018-06-06 RX ADMIN — TORSEMIDE 50 MG: 20 TABLET ORAL at 20:26

## 2018-06-06 RX ADMIN — POTASSIUM CHLORIDE 20 MEQ: 750 TABLET, EXTENDED RELEASE ORAL at 11:56

## 2018-06-06 RX ADMIN — Medication 1 G: at 08:43

## 2018-06-06 RX ADMIN — LISINOPRIL 5 MG: 5 TABLET ORAL at 08:43

## 2018-06-06 RX ADMIN — IOPAMIDOL 80 ML: 755 INJECTION, SOLUTION INTRAVASCULAR at 17:30

## 2018-06-06 RX ADMIN — POTASSIUM CHLORIDE 20 MEQ: 1.5 POWDER, FOR SOLUTION ORAL at 21:46

## 2018-06-06 RX ADMIN — HEPARIN SODIUM 5000 UNITS: 1000 INJECTION, SOLUTION INTRAVENOUS; SUBCUTANEOUS at 16:48

## 2018-06-06 RX ADMIN — MIDAZOLAM 1 MG: 1 INJECTION INTRAMUSCULAR; INTRAVENOUS at 16:35

## 2018-06-06 RX ADMIN — LISINOPRIL 5 MG: 5 TABLET ORAL at 20:26

## 2018-06-06 RX ADMIN — MULTIPLE VITAMINS W/ MINERALS TAB 1 TABLET: TAB at 08:43

## 2018-06-06 RX ADMIN — NITROGLYCERIN 200 MCG: 5 INJECTION, SOLUTION INTRAVENOUS at 16:49

## 2018-06-06 RX ADMIN — FUROSEMIDE 80 MG: 10 INJECTION, SOLUTION INTRAVENOUS at 08:43

## 2018-06-06 RX ADMIN — ATORVASTATIN CALCIUM 40 MG: 40 TABLET ORAL at 20:26

## 2018-06-06 RX ADMIN — LEVOTHYROXINE SODIUM 175 MCG: 25 TABLET ORAL at 20:36

## 2018-06-06 RX ADMIN — MIDAZOLAM 1 MG: 1 INJECTION INTRAMUSCULAR; INTRAVENOUS at 16:44

## 2018-06-06 RX ADMIN — NICARDIPINE HYDROCHLORIDE 200 MCG: 2.5 INJECTION INTRAVENOUS at 16:49

## 2018-06-06 RX ADMIN — FENTANYL CITRATE 50 MCG: 50 INJECTION, SOLUTION INTRAMUSCULAR; INTRAVENOUS at 17:11

## 2018-06-06 RX ADMIN — WARFARIN SODIUM 12.5 MG: 10 TABLET ORAL at 20:26

## 2018-06-06 ASSESSMENT — PAIN DESCRIPTION - DESCRIPTORS
DESCRIPTORS: DISCOMFORT
DESCRIPTORS: DISCOMFORT

## 2018-06-06 NOTE — PROGRESS NOTES
Pt received from [facility] ~1720 via a cart accompanied by RN. S/p R and L heart cath.  R neck covered with Band- Aid/cdi/no hematoma.  R racial access has TR band with 12cc of air. R hand CMS intact and + RP. Pt is stable.VSS.       Provider notification note: 1845  Pt had numbness of R 4th and 5 digits that lasted for about 20min/Dr. Concepcion came and saw pt .   Numbness resolved after TR band removed.

## 2018-06-06 NOTE — PLAN OF CARE
Problem: Patient Care Overview  Goal: Plan of Care/Patient Progress Review  RN:  1.Pt will remain hemodynamically stable.  2.Fluid status will be optimized.  AVSS.  Denies discomfort.  Lasix given per orders.  Voiding per urinal(see I&O).  Legs edematous.  Bilat compression wraps on per pt schedule(declined removal at night).  Pt stable.  Pleasant without distress.  Slept between cares/assessments.  Rhythm: Afib 60-80's.  Continue to follow fluid status, labs.  NPO for cath lab today.

## 2018-06-06 NOTE — PROGRESS NOTES
Care Coordinator Progress Note    Admission Date/Time:  6/4/2018  Attending MD:  Karla Bey MD  Data  Chart reviewed, discussed with interdisciplinary team.   Patient with h/o HFpEF , Atrial Fibrillation  s/p ablation 2008 , THONG, HTN , HLD, PAD , hypothyrodism, DM II , MDD/anxiety; admitted with HFpEF exacerbation.     Concerns with insurance coverage for discharge needs: None.  Current Living Situation: Patient lives alone in an apartment.  Support System: Supportive and Involved friends: Janice Hillman (Ph: [phone]) and Dre Erikaff Ph: [phone])  Services Involved: [facility], [facility] for home O2 and Cpap.   Transportation at Discharge: [employer].   Transportation to Medical Appointments: [employer].   Barriers to Discharge: chronically ill    Coordination of Care and Referrals: Provided patient/family with options for resumption of home care.    Assessment  Pt currently receiving Lasix IV, O2, telemetry cardiac monitoring. Pt to have RHC and LHC today.  This writer spoke to pt regarding discharge planning. Pt said that he wants to resume his home care with [facility]. Pt also said that he has home O2 but will not need port O2 for his ride home. [facility]   Intervention:   Arrangements made with [facility] (Ph: [phone] Fax: [phone]) for RN eval post hospitalization, resume previous orders, assess vital signs, respiratory and cardiac status, activity tolerance, hydration, nutritional status, med set up and management, heart failure education, INR lab draws. PT/OT eval and treat for deconditioning, strengthening, and endurance. Resume lymphedema therapy.    Plan  Anticipated Discharge Date:  TBD  Anticipated Discharge Plan:  Discharge to home with home care.   CC will continue to monitor pt's medical condition and progress toward discharge.     LUISA AGUILAR RN BSN  Care Coordinator Unit  6c [phone]

## 2018-06-06 NOTE — PROGRESS NOTES
CARDIOLOGY 1 SERVICE PROGRESS NOTE    Clarke Moreira ([id number]) admitted on 6/4/2018 06/06/2018    Changes today:  - Continue Diuresis with Lasix 80mg TID  - RHC and LHC today  - IP Core clinic consult per CORE clinic request         Assessment and Plan:   Clarke Moreira is a 68 year old with a PMH that is pertinent for HFpEF , Atrial Fibrillation  s/p ablation 2008 , THONG, HTN , HLD, PAD , hypothyrodism, DM II , MDD/anxiety who is presenting for RHC and Coronary Angiogram.     #HFpEF exacerbation   HFpEF likely 2/2 HTN, HLD, DM2, THONG. Etiology for exacerbation is unclear but likely slow progression of disease process. Patient is still volume overloaded on examination    Baseline weight: ~365lb  PTA Diuretic regimen: 80mg AM/60mg PM    - Inpatient diuresis: IV Lasix 80mg IV TID.   - Continue Aldactone, Metoprolol, Lisinopril 5mg BID.  - Per CORE clinic recommendations, will plan for RHC and Coronary Angiogram. Patient has had atypical symptoms of chest pain; however, he does have many risk factors for CAD.   - CORE clinic consult     #A.fib s/p Ablation 2008 with recurrence (CHADSVASC 5)  - Continue Metoprolol 100 mg qday   - Hold warfarin plan for RHC/LHC  (got 12.5 mg on admission , will hold any further doses)   - Daily INR       #HTN   -Continue medications as above     #DM II  Hold Metformin   - SSI       #HLD   Atorvastatin 40 mg Qhs       #THONG   Continue CPAP Qhs       #Hypothyrodism   Continue PTA levothyroxine 175 mcg     FEN/Electrolytes: Cardiac diet    Prophylaxis:   GI: None  DVT: On Coumadin    Code Status: DNR/DNI (Okay with reversing for procedures)    Disposition: Anticipate return to prior living arrangements in 2-3 days, pending improvement in volume status    Patient discussed with Dr. Bey who agrees with the plan      Ite Obtan  Cardiology 1 service   PGY-2 Internal Medicine  Pager:  [phone]    ==================================================================    Subjective:  No acute events overnight. Patient seen this AM. Endorses feeling better from a breathing standpoint. Also endorses improvement in leg swelling. No other questions or concerns.     Objective:  Most recent vital signs:  /82 (BP Location: Left arm)  Pulse 122  Temp 98.6  F (37  C) (Oral)  Resp 18  Ht 1.829 m (6')  Wt (!) 168.4 kg (371 lb 4.8 oz)  SpO2 98%  BMI 50.36 kg/m2  Temp:  [97.4  F (36.3  C)-98.7  F (37.1  C)] 98.6  F (37  C)  Heart Rate:  [] 100  Resp:  [16-20] 18  BP: (104-126)/(63-82) 118/82  SpO2:  [95 %-99 %] 98 %  Wt Readings from Last 2 Encounters:   06/06/18 (!) 168.4 kg (371 lb 4.8 oz)   05/21/18 (!) 174.9 kg (385 lb 9.6 oz)       Intake/Output Summary (Last 24 hours) at 06/06/18 1341  Last data filed at 06/06/18 0900   Gross per 24 hour   Intake              840 ml   Output             3750 ml   Net            -2910 ml       Physical exam:  General: Patient lying comfortably in bed, NAD  HEENT: EOMI, PERRL, no scleral icterus or injection, no bruits appreciated, JVD to mid-neck  Cardiac: RRR, no murmurs. No rubs or gallops appreciated.  Respiratory: CTAB, no wheezes, rhonchi appreciate. Has bibasilar crackles posteriorly.   GI: NABS, NT/ND, no guarding or rebound  Extremities: 2+ pittine LE edema to knees, compression stockings on. pulses DP 2+, radial pulses 2+  Skin: No acute lesions appreciated  Neuro: AOx3, CN II-XII grossly intact, moving extremities normally     Labs (Past three days):  Reviewed    Imaging/procedure results:   Reviewed

## 2018-06-06 NOTE — PLAN OF CARE
Problem: Patient Care Overview  Goal: Plan of Care/Patient Progress Review  Outcome: No Change  Pt with HF to have a R and L heart cath tomorrow. Continues in Afib 70s-100s with VSS. LE edema. Potassium protocol increased and pt given 20 meq KCL for K+ of 3.5 this AM. Denies pain. Heart cath procedures explained to the pt who appreciated the explanation.

## 2018-06-07 ENCOUNTER — APPOINTMENT (OUTPATIENT)
Dept: OCCUPATIONAL THERAPY | Facility: CLINIC | Age: 68
DRG: 287 | End: 2018-06-07
Attending: INTERNAL MEDICINE
Payer: COMMERCIAL

## 2018-06-07 LAB
ANION GAP SERPL CALCULATED.3IONS-SCNC: 8 MMOL/L (ref 3–14)
BUN SERPL-MCNC: 18 MG/DL (ref 7–30)
CALCIUM SERPL-MCNC: 8.9 MG/DL (ref 8.5–10.1)
CHLORIDE SERPL-SCNC: 100 MMOL/L (ref 94–109)
CO2 SERPL-SCNC: 30 MMOL/L (ref 20–32)
CREAT SERPL-MCNC: 0.94 MG/DL (ref 0.66–1.25)
GFR SERPL CREATININE-BSD FRML MDRD: 80 ML/MIN/1.7M2
GLUCOSE BLDC GLUCOMTR-MCNC: 106 MG/DL (ref 70–99)
GLUCOSE BLDC GLUCOMTR-MCNC: 121 MG/DL (ref 70–99)
GLUCOSE BLDC GLUCOMTR-MCNC: 131 MG/DL (ref 70–99)
GLUCOSE BLDC GLUCOMTR-MCNC: 90 MG/DL (ref 70–99)
GLUCOSE SERPL-MCNC: 125 MG/DL (ref 70–99)
INR PPP: 1.48 (ref 0.86–1.14)
MAGNESIUM SERPL-MCNC: 2.2 MG/DL (ref 1.6–2.3)
POTASSIUM SERPL-SCNC: 3.7 MMOL/L (ref 3.4–5.3)
SODIUM SERPL-SCNC: 138 MMOL/L (ref 133–144)

## 2018-06-07 PROCEDURE — 40000133 ZZH STATISTIC OT WARD VISIT

## 2018-06-07 PROCEDURE — 85610 PROTHROMBIN TIME: CPT | Performed by: INTERNAL MEDICINE

## 2018-06-07 PROCEDURE — 00000146 ZZHCL STATISTIC GLUCOSE BY METER IP

## 2018-06-07 PROCEDURE — 21400006 ZZH R&B CCU INTERMEDIATE UMMC

## 2018-06-07 PROCEDURE — 25000132 ZZH RX MED GY IP 250 OP 250 PS 637: Performed by: INTERNAL MEDICINE

## 2018-06-07 PROCEDURE — 80048 BASIC METABOLIC PNL TOTAL CA: CPT | Performed by: INTERNAL MEDICINE

## 2018-06-07 PROCEDURE — 25000132 ZZH RX MED GY IP 250 OP 250 PS 637: Performed by: STUDENT IN AN ORGANIZED HEALTH CARE EDUCATION/TRAINING PROGRAM

## 2018-06-07 PROCEDURE — 97110 THERAPEUTIC EXERCISES: CPT | Mod: GO

## 2018-06-07 PROCEDURE — 83735 ASSAY OF MAGNESIUM: CPT | Performed by: INTERNAL MEDICINE

## 2018-06-07 PROCEDURE — 36415 COLL VENOUS BLD VENIPUNCTURE: CPT | Performed by: INTERNAL MEDICINE

## 2018-06-07 PROCEDURE — 99233 SBSQ HOSP IP/OBS HIGH 50: CPT | Mod: GC | Performed by: INTERNAL MEDICINE

## 2018-06-07 RX ORDER — TORSEMIDE 10 MG/1
50 TABLET ORAL
Qty: 300 TABLET | Refills: 3 | Status: SHIPPED | OUTPATIENT
Start: 2018-06-07 | End: 2018-06-08

## 2018-06-07 RX ADMIN — SPIRONOLACTONE 50 MG: 50 TABLET ORAL at 21:08

## 2018-06-07 RX ADMIN — MULTIPLE VITAMINS W/ MINERALS TAB 1 TABLET: TAB at 08:45

## 2018-06-07 RX ADMIN — ATORVASTATIN CALCIUM 40 MG: 40 TABLET ORAL at 21:08

## 2018-06-07 RX ADMIN — LEVOTHYROXINE SODIUM 175 MCG: 25 TABLET ORAL at 21:08

## 2018-06-07 RX ADMIN — TAMSULOSIN HYDROCHLORIDE 0.4 MG: 0.4 CAPSULE ORAL at 08:45

## 2018-06-07 RX ADMIN — POTASSIUM CHLORIDE 20 MEQ: 750 TABLET, EXTENDED RELEASE ORAL at 10:59

## 2018-06-07 RX ADMIN — LISINOPRIL 5 MG: 5 TABLET ORAL at 08:45

## 2018-06-07 RX ADMIN — METOPROLOL SUCCINATE 100 MG: 100 TABLET, EXTENDED RELEASE ORAL at 21:08

## 2018-06-07 RX ADMIN — POLYETHYLENE GLYCOL 3350 17 G: 17 POWDER, FOR SOLUTION ORAL at 12:08

## 2018-06-07 RX ADMIN — WARFARIN SODIUM 12.5 MG: 10 TABLET ORAL at 18:15

## 2018-06-07 RX ADMIN — Medication 75 MG: at 08:46

## 2018-06-07 RX ADMIN — ASPIRIN 81 MG: 81 TABLET, COATED ORAL at 21:07

## 2018-06-07 RX ADMIN — Medication 1 G: at 08:45

## 2018-06-07 NOTE — PLAN OF CARE
Problem: Patient Care Overview  Goal: Plan of Care/Patient Progress Review  RN:  1.Pt will remain hemodynamically stable.  2.Fluid status will be optimized.   Outcome: Improving  D: Pt with HF , Afib, ablation 2008, THONG,HLD, PAD,hypothyrodism, anxiety, volume overloaded, had RHC and coronary angiogram 6/6 per MD note.  DNR/DNI.   I: Monitored vitals and assessed pt status.     A: A0x4. VSS, on 2 L nc,  Afib,  Afebrile. Denies pain during shift. R radial site CDI, no bleeding,no hematoma observed, R neck site covered, CDI, slightly tender,no hematoma no bleeding. Pt on 2 L nc,  Denies any pain,no nausea reported.  BLE covered with compression stocking, pt wants them on.  overnight.   Temp:  [96.3  F (35.7  C)-98.6  F (37  C)] 97.5  F (36.4  C)  Pulse:  [75-98] 98  Heart Rate:  [] 88  Resp:  [16-20] 18  BP: (102-126)/(55-82) 125/55  FiO2 (%):  [2 %] 2 %  SpO2:  [93 %-100 %] 99 %      P: Continue to monitor Pt status and report changes to treatment team.

## 2018-06-07 NOTE — PLAN OF CARE
Problem: Patient Care Overview  Goal: Plan of Care/Patient Progress Review  RN:  1.Pt will remain hemodynamically stable.  2.Fluid status will be optimized.   Discharge Planner OT   Patient plan for discharge: Home with home therapy   Current status:  Pt demo'd hypotensive BPs at start of session. BP taken prior to ambulation 107/40. Pt very motivated to ambulate despite BP levels, pt reported minimal symptoms. O2 removed per pt report as levels increase with movement. Pt ambulated 375ft with FWW, CGA and WC follow. Pt required standing rest breaks every 75ft. Pt wheeled back to room 2/2 to fatigue. BP 88/60 post exercise. O2 97-99%. HR: 130s-150s. RN notified.    Barriers to return to prior living situation: Endurance, strength, Medical clearance   Recommendations for discharge: Home with Home therapy   Rationale for recommendations: pt would benefit from continued therapy to increase strength and endurance required for ADLs/IADLs at home.        Entered by: Haven Gutierrez 06/07/2018 4:10 PM

## 2018-06-07 NOTE — PLAN OF CARE
Problem: Patient Care Overview  Goal: Individualization & Mutuality  Outcome: No Change    D. Admitted for R and L heart cath. HF exacerbation. O2sat >96% on 2LNC/home O2. AFib on tele. BP stable. Pt had RHC and coronary angiogram this afternoon.  R neck access covered with Band-Aid. Pt came with TR band on R Radial access.  TR band was removed per protocol.  R radial access site CDI/it is w/o any hematoma or bleeding.   I. Keeping a strict I and O's. K=3.4 was replaced.   A. Pt is stable.   P. Continue monitoring pt.

## 2018-06-07 NOTE — CONSULTS
Clarke is already an established patient in the CORE/Heart Failure clinic.  Last appt was with Sybil Norman NP in [facility] on .  I spoke with him/her at the bedside today about his/her discharge plans.  I have scheduled Ryley Return CORE/heart failure appt with  with Alisha Norman NP, at 1:00 with labs at 12:30.     I instructed him to call with any questions or concerns, including any weight gain or loss of 2 or more pounds in 24 hours or 5 or more pounds in 1 week. I will follow-up with him at the time of appt, sooner if needed.  Thank you for the consult.       Marta Heath RN CHFN  Cardiology Care Coordinator - C.O.R.E[facility]   Questions and [phone]

## 2018-06-07 NOTE — PROGRESS NOTES
CARDIOLOGY 1 SERVICE PROGRESS NOTE    Clarke Moreira ([id number]) admitted on 6/4/2018 06/07/2018    Changes today:  - Hold diuresis today given orthostasis  - Hold Lisinopril tonight  - Remove fluid restriction. Encourage PO intake.  - Will resume Torsemide 50/50 in AM w/ follow up scheduled with PCP and [facility] clinic         Assessment and Plan:   Clarke Moreira is a 68 year old with a PMH that is pertinent for HFpEF , Atrial Fibrillation  s/p ablation 2008 , THONG, HTN , HLD, PAD , hypothyrodism, DM II , MDD/anxiety who is presenting for RHC and Coronary Angiogram.     #HFpEF exacerbation   HFpEF likely 2/2 HTN, HLD, DM2, THONG. Etiology for exacerbation is unclear but likely slow progression of disease process. Patient is still volume overloaded on examination. RHC shows euvolemia with LHC not showing significant CAD.    Baseline weight: ~365lb  PTA Diuretic regimen: 80mg AM/60mg PM    - Inpatient diuresis: Torsemide 50/50mg (On hold 6/7 due to orthostasis)   - Continue Aldactone, Metoprolol, Lisinopril 5mg BID.  - Remove fluid restriction. Encourage PO intake.  - CORE clinic consult     #A.fib s/p Ablation 2008 with recurrence (CHADSVASC 5)  - Continue Metoprolol 100 mg qday   - Hold warfarin plan for RHC/LHC  (got 12.5 mg on admission , will hold any further doses)   - Daily INR       #HTN   -Continue medications as above     #DM II  Hold Metformin   - SSI       #HLD   Atorvastatin 40 mg Qhs       #THONG   Continue CPAP Qhs       #Hypothyrodism   Continue PTA levothyroxine 175 mcg     FEN/Electrolytes: Cardiac diet    Prophylaxis:   GI: None  DVT: On Coumadin    Code Status: DNR/DNI (Okay with reversing for procedures)    Disposition: Anticipate return to prior living arrangements 6/8, pending improvement in volume status    Patient discussed with Dr. Bey who agrees with the plan      [facility]  Cardiology 1 service   PGY-2 Internal Medicine  Pager:  [phone]    ==================================================================    Subjective:  No acute events overnight. Patient seen this AM. Endorses feeling better from a breathing standpoint. Also endorses improvement in leg swelling. No other questions or concerns.   Noted to be orthostatic just prior to discharge, so will stay for observation overnight.    Objective:  Most recent vital signs:  BP 97/64 (BP Location: Left arm)  Pulse 98  Temp 98.4  F (36.9  C) (Oral)  Resp 18  Ht 1.829 m (6')  Wt (!) 168.1 kg (370 lb 9.6 oz)  SpO2 97%  BMI 50.26 kg/m2  Temp:  [96.3  F (35.7  C)-98.4  F (36.9  C)] 98.4  F (36.9  C)  Pulse:  [80-98] 98  Heart Rate:  [] 103  Resp:  [18] 18  BP: ()/(50-73) 97/64  SpO2:  [97 %-100 %] 97 %  Wt Readings from Last 2 Encounters:   06/07/18 (!) 168.1 kg (370 lb 9.6 oz)   05/21/18 (!) 174.9 kg (385 lb 9.6 oz)       Intake/Output Summary (Last 24 hours) at 06/06/18 1341  Last data filed at 06/06/18 0900   Gross per 24 hour   Intake              840 ml   Output             3750 ml   Net            -2910 ml       Physical exam:  General: Patient lying comfortably in bed, NAD  HEENT: EOMI, PERRL, no scleral icterus or injection, no bruits appreciated, JVD to mid-neck  Cardiac: RRR, no murmurs. No rubs or gallops appreciated.  Respiratory: CTAB, no wheezes, rhonchi appreciate. Has bibasilar crackles posteriorly.   GI: NABS, NT/ND, no guarding or rebound  Extremities: 2+ pittine LE edema to knees, compression stockings on. pulses DP 2+, radial pulses 2+  Skin: No acute lesions appreciated  Neuro: AOx3, CN II-XII grossly intact, moving extremities normally     Labs (Past three days):  Reviewed    Imaging/procedure results:   Reviewed

## 2018-06-08 VITALS
HEART RATE: 83 BPM | OXYGEN SATURATION: 96 % | WEIGHT: 315 LBS | TEMPERATURE: 98.1 F | HEIGHT: 72 IN | BODY MASS INDEX: 42.66 KG/M2 | SYSTOLIC BLOOD PRESSURE: 119 MMHG | DIASTOLIC BLOOD PRESSURE: 83 MMHG | RESPIRATION RATE: 18 BRPM

## 2018-06-08 LAB
ANION GAP SERPL CALCULATED.3IONS-SCNC: 7 MMOL/L (ref 3–14)
BUN SERPL-MCNC: 24 MG/DL (ref 7–30)
CALCIUM SERPL-MCNC: 8.9 MG/DL (ref 8.5–10.1)
CHLORIDE SERPL-SCNC: 97 MMOL/L (ref 94–109)
CO2 SERPL-SCNC: 31 MMOL/L (ref 20–32)
CREAT SERPL-MCNC: 1.18 MG/DL (ref 0.66–1.25)
GFR SERPL CREATININE-BSD FRML MDRD: 61 ML/MIN/1.7M2
GLUCOSE BLDC GLUCOMTR-MCNC: 112 MG/DL (ref 70–99)
GLUCOSE BLDC GLUCOMTR-MCNC: 130 MG/DL (ref 70–99)
GLUCOSE BLDC GLUCOMTR-MCNC: 136 MG/DL (ref 70–99)
GLUCOSE BLDC GLUCOMTR-MCNC: 140 MG/DL (ref 70–99)
GLUCOSE SERPL-MCNC: 130 MG/DL (ref 70–99)
INR PPP: 1.5 (ref 0.86–1.14)
MAGNESIUM SERPL-MCNC: 2.3 MG/DL (ref 1.6–2.3)
POTASSIUM SERPL-SCNC: 3.9 MMOL/L (ref 3.4–5.3)
SODIUM SERPL-SCNC: 135 MMOL/L (ref 133–144)

## 2018-06-08 PROCEDURE — 25000132 ZZH RX MED GY IP 250 OP 250 PS 637: Performed by: INTERNAL MEDICINE

## 2018-06-08 PROCEDURE — 36415 COLL VENOUS BLD VENIPUNCTURE: CPT | Performed by: INTERNAL MEDICINE

## 2018-06-08 PROCEDURE — 25000132 ZZH RX MED GY IP 250 OP 250 PS 637: Performed by: STUDENT IN AN ORGANIZED HEALTH CARE EDUCATION/TRAINING PROGRAM

## 2018-06-08 PROCEDURE — 85610 PROTHROMBIN TIME: CPT | Performed by: INTERNAL MEDICINE

## 2018-06-08 PROCEDURE — 00000146 ZZHCL STATISTIC GLUCOSE BY METER IP

## 2018-06-08 PROCEDURE — 99239 HOSP IP/OBS DSCHRG MGMT >30: CPT | Mod: GC | Performed by: INTERNAL MEDICINE

## 2018-06-08 PROCEDURE — 80048 BASIC METABOLIC PNL TOTAL CA: CPT | Performed by: INTERNAL MEDICINE

## 2018-06-08 PROCEDURE — 83735 ASSAY OF MAGNESIUM: CPT | Performed by: INTERNAL MEDICINE

## 2018-06-08 RX ORDER — WARFARIN SODIUM 7.5 MG/1
15 TABLET ORAL
Status: COMPLETED | OUTPATIENT
Start: 2018-06-08 | End: 2018-06-08

## 2018-06-08 RX ORDER — TORSEMIDE 100 MG/1
50 TABLET ORAL
Qty: 60 TABLET | Refills: 1 | Status: SHIPPED | OUTPATIENT
Start: 2018-06-08 | End: 2018-06-18

## 2018-06-08 RX ADMIN — TAMSULOSIN HYDROCHLORIDE 0.4 MG: 0.4 CAPSULE ORAL at 07:48

## 2018-06-08 RX ADMIN — WARFARIN SODIUM 15 MG: 7.5 TABLET ORAL at 17:06

## 2018-06-08 RX ADMIN — POTASSIUM CHLORIDE 20 MEQ: 750 TABLET, EXTENDED RELEASE ORAL at 10:47

## 2018-06-08 RX ADMIN — MULTIPLE VITAMINS W/ MINERALS TAB 1 TABLET: TAB at 07:48

## 2018-06-08 RX ADMIN — Medication 1 G: at 07:48

## 2018-06-08 ASSESSMENT — PAIN DESCRIPTION - DESCRIPTORS: DESCRIPTORS: ACHING

## 2018-06-08 NOTE — PLAN OF CARE
Problem: Patient Care Overview  Goal: Plan of Care/Patient Progress Review  RN:  1.Pt will remain hemodynamically stable.  2.Fluid status will be optimized.   Outcome: No Change  D: CHF exacerbation (SOB)--diuresing had right heart cath yesterday. Plan was to d/c today; however pt has orthostatic hypotension (see BP at 1424). Pt to stay the night to continue to monitor.    I: Monitored vitals and assessed pt status.   B/P: 107/62, T: 98.2, P: 98, R: 18 O2: 97% 1.5 L    Changed: Pt walking with cardiac rehab when BP was 80s/60s. RN reassessed. Took orthostatic BP lay: 111/70. Sit 83/60 Stand: 65/53. Pt stated he felt lightheaded when he stood up. Reported to MD. MD cancelled BP meds for tonight and removed 2L FR --encouraged pt to drink. Last BP taken at 1815 of 97/64. CardsI MD notified and states we will continue to watch.   Pt checks BG 1-2x/week at home. Currently checking with meals, bedtime and 0200--per pt request notified MDs to potentially decrease BG monitoring during stay.       Running: none. PIV SL  PRN: none    A: A0x4. Afebrile. Urinating adequately. A fib with controlled rate while resting. With activity, rate can increase 120-150 upon exertion. MD updated and aware.  Bubba socks removed this shift, cleansed, lotion, reapplied (off for 6 hrs). Discoloration on toe marked. MD aware.  LDA: SON--WDL; R wrist cath site WDL    P: Continue to monitor Pt status and report changes to treatment team. Potentially d/c tomorrow if no other issues.

## 2018-06-08 NOTE — DISCHARGE SUMMARY
Cardiology 1 Service Discharge Summary  Clarke Moreira MRN: [id number]  1950    Primary care provider: Matthias Sanchez  ___________________________________          Date of Admission:  6/4/2018  Date of Discharge:  6/8/2018   Admitting Physician:  Karla Bey MD  Discharge Physician:  Karla Bey MD  Discharging Service:  Cardiology 1 Service     Primary Provider: Matthias Sanchez    ---- FOLLOW UP --------  CORE clinic  INR check with home RN  ---------------------------------------------------------------------------------------------------------------           Reason for Admission:   Elective right and left heart cath         Discharge Diagnosis:   Acute decompensated HFpEF  Afib s/p ablation in 2008 w/ recurrence ([id number])  HTN  DM2  HLD  THONG  Hypothyroidism         Procedures & Significant Findings:     Right heart cath 6/6  Elevated R/L filling pressures    Left heart cath 6/6  No significant stenosis seen.         Consultations:   [facility] clinic         Hospital Course by Problem:    Clarke Moreira is a 68 year old with a PMH that is pertinent for HFpEF , Atrial Fibrillation  s/p ablation 2008 , THONG, HTN , HLD, PAD , hypothyrodism, DM II , MDD/anxiety who presented for RHC and Coronary Angiogram and found to be volume overloaded.      #HFpEF exacerbation   HFpEF likely 2/2 HTN, HLD, DM2, THONG. Etiology for exacerbation is unclear but likely slow progression of disease process. Patient was volume overloaded on examination. Was diuresed 17lb with weight back to 370lb at discharge which appears to be new dry weight. Was discharged on a new regimen of Torsemide 50mg AM/50mg PM (This was held day prior to d/c due to orthostasis). Patient was discharged with instructions to continue taking at home. Also discharged on PTA Aldactone, Metoprolol, Lisinopril 5mg BID. A CORE clinic consult was placed prior to discharge.       #A.fib  s/p Ablation 2008 with recurrence (CHADSVASC 5)  Continued Metoprolol 100 mg qday and Warfarin. Will get INR checks with home care RN.    Physical Exam on day of Discharge:  Blood pressure 119/83, pulse 83, temperature 98.1  F (36.7  C), temperature source Oral, resp. rate 18, height 1.829 m (6'), weight (!) 167.8 kg (369 lb 14.9 oz), SpO2 96 %.  General: Patient lying comfortably in bed, NAD  HEENT: EOMI, PERRL, no scleral icterus or injection, no bruits appreciated, no JVD  Cardiac: RRR, no murmurs. No rubs or gallops appreciated.  Respiratory: CTAB, no wheezes, rhonchi appreciate. No bibasilar crackles posteriorly.   GI: NABS, NT/ND, no guarding or rebound  Extremities: no LE edema, compression stockings on. pulses DP 2+, radial pulses 2+  Skin: No acute lesions appreciated  Neuro: AOx3, CN II-XII grossly intact, moving extremities normally            Pending Results:   None         Discharge Medications:     Discharge Medication List as of 6/8/2018  5:36 PM      CONTINUE these medications which have CHANGED    Details   torsemide (DEMADEX) 100 MG tablet Take 0.5 tablets (50 mg) by mouth 2 times daily, Disp-60 tablet, R-1, E-PrescribeHOLD 6/8 AM DOSE. RESUME 6/8 PM         CONTINUE these medications which have NOT CHANGED    Details   Ascorbic Acid (VITAMIN C PO) Historical      aspirin 81 MG tablet Take 1 tablet (81 mg) by mouth daily, Disp-30 tablet, R-0, E-Prescribe      atorvastatin (LIPITOR) 40 MG tablet Take 1 tablet (40 mg) by mouth daily, Disp-30 tablet, R-11, E-Prescribe      blood glucose monitoring (ONE TOUCH DELICA) lancets Use to test blood sugars 2 times daily or as directed., Disp-100 each, R-11, E-Prescribe      blood glucose monitoring (ONE TOUCH ULTRA MINI) meter device kit Use to test blood sugars 2 times daily or as directed.Disp-1 kit, [id number]      blood glucose monitoring (ONETOUCH ULTRA) test strip Use to test blood sugars 2 times daily or as directed., Disp-100 strip, R-11,  E-Prescribe      levothyroxine (SYNTHROID/LEVOTHROID) 175 MCG tablet Take 1 tablet (175 mcg) by mouth daily, Disp-30 tablet, R-11, E-Prescribe      lisinopril (PRINIVIL/ZESTRIL) 5 MG tablet Take 1 tablet (5 mg) by mouth 2 times daily, Disp-60 tablet, R-3, E-Prescribe      metFORMIN (GLUCOPHAGE) 500 MG tablet Take 1 tablet (500 mg) by mouth 2 times daily (with meals), Disp-60 tablet, R-11, E-Prescribe      metoprolol (TOPROL-XL) 100 MG 24 hr tablet Take 1 tablet (100 mg) by mouth daily, Disp-30 tablet, R-11, E-Prescribe      multivitamin, therapeutic with minerals (MULTI-VITAMIN) TABS tablet Take 1 tablet by mouth daily, Disp-30 each, R-11, E-Prescribe      omega 3 1000 MG CAPS Take 1 g by mouth daily, Disp-30 capsule, R-11, E-Prescribe      !! order for DME Equipment being ordered: Bariatric Lift chair  Diagnosis - morbid obesity, CHF, Lymphedema    Fax to Ne from [employer] at [phone].Disp-1 Units, R-0, Local Print      !! order for DME Equipment being ordered: Other: Velcro Compression stockings.   Treatment Diagnosis: bilateral lymphedema.Disp-2 each, R-0, Local Print      polyethylene glycol (MIRALAX/GLYCOLAX) Packet Take 17 g by mouth daily as needed for constipation, Disp-15 packet, R-11, E-Prescribe      potassium chloride SA (K-DUR/KLOR-CON M) 20 MEQ CR tablet Take 40 meq in the morning and 20 meq in the afternoons., Disp-60 tablet, R-11, E-Prescribe      senna-docusate (SENOKOT-S;PERICOLACE) 8.6-50 MG per tablet Take 1-2 tablets by mouth 2 times daily as needed for constipation, Disp-60 tablet, R-11, E-Prescribe      spironolactone (ALDACTONE) 25 MG tablet Take 1 tablet (25 mg) by mouth daily, Disp-30 tablet, R-3, E-Prescribe      tamsulosin (FLOMAX) 0.4 MG capsule Take 1 capsule (0.4 mg) by mouth daily, Disp-30 capsule, R-11, E-Prescribe      vitamin B complex with vitamin C (VITAMIN  B COMPLEX) TABS tablet Take 1 tablet by mouth daily, Historical      VITAMIN E COMPLEX PO Historical      warfarin  (COUMADIN) 5 MG tablet Take 2.5 tablets (12.5 mg) by mouth daily, Disp-75 tablet, R-11, E-Prescribe       !! - Potential duplicate medications found. Please discuss with provider.      STOP taking these medications       furosemide (LASIX) 20 MG tablet Comments:   Reason for Stopping:                    Discharge Instructions and Follow-Up:     Discharge Procedure Orders  Home care nursing referral   Referral Type: Home Health Therapies & Aides     Home care nursing referral   Referral Type: Home Health Therapies & Aides     Reason for your hospital stay   Order Comments: Heart failure exacerbation     Activity   Order Comments: Your activity upon discharge: activity as tolerated   Order Specific Question Answer Comments   Is discharge order? Yes      Discharge Instructions   Order Comments: Please stop taking the Lasix and take the Torsemide instead. If you notice increasing weight gain, please let your primary care doctor know so that they can change your diuretic regimen. You should follow up at the [facility] clinic in 2-4 weeks from now to make sure you are doing okay. Also follow up with your primary care doctor in the next week or so.     Be sure to have your home care RN draw your INR to make sure it is heading in the right direction     Adult [facility] Follow-up and recommended labs and tests   Order Comments: Follow up with primary care provider, Matthias Sanchez, within 7 days to evaluate medication change.  The following labs/tests are recommended: BMP.    Follow     Appointments on [place] and/or [facility] with [facility] clinic at [facility], within 2-4 weeks to evaluate medication change. The following labs/tests are recommended: BMP.[facility] (with [facility] or [facility] provider or service). Call [phone] if you haven't heard regarding these appointments within 7 days of discharge.     Full Code     Oxygen Adult   Order Comments: Renew Home Oxygen Order  Renew previous prescription.  Expected treatment length is  indefinite (99 months).    Attending Provider: Karla Bey MD  Physician signature: See electronic signature associated with these discharge orders  Date of Order: June 7, 2018     Diet   Order Comments: Follow this diet upon discharge: Orders Placed This Encounter     Fluid restriction 2000 ML FLUID     Low Saturated Fat Na <2400 mg   Order Specific Question Answer Comments   Is discharge order? Yes             Discharge Disposition:   Home          Condition on Discharge:   Discharge condition: Stable   Code status on discharge: DNR / DNI        Date of service: 6/8/2018    The patient was discussed with Dr. Sotero [facility]  Cardiology service  PGY-2 Internal Medicine  Pager: [phone]

## 2018-06-08 NOTE — PROGRESS NOTES
NSG DISCHARGE NOTE    Patient discharged to home at 1730 PM via wheel chair. Accompanied by other: med transport and staff. Discharge instructions reviewed with patient, opportunity offered to ask questions. Prescriptions filled and sent with patient upon discharge. All belongings sent with patient.    Octavio Coburn

## 2018-06-08 NOTE — PLAN OF CARE
Problem: Patient Care Overview  Goal: Plan of Care/Patient Progress Review  RN:  1.Pt will remain hemodynamically stable.  2.Fluid status will be optimized.   Occupational Therapy Discharge Summary    Reason for therapy discharge:    Discharged to home with home therapy.    Progress towards therapy goal(s). See goals on Care Plan in [facility] electronic health record for goal details.  Goals partially met.  Barriers to achieving goals:   discharge from facility.    Therapy recommendation(s):    Continued therapy is recommended.  Rationale/Recommendations:  To progress with strength and endurance limiting performance in ADLs and IADLs..

## 2018-06-08 NOTE — PROGRESS NOTES
DISCHARGE   Discharged to: Home  Via: shuttle  Accompanied by: self  Discharge Instructions: principal diagnosis, major findings/procedures, diet, activity, medications, follow up appointments, when to call the MD, and what to watchout for (i.e. s/s of infection, increasing SOB, palpitations, Angina). Pt states understanding of all discharge instructions.   Prescriptions: To be filled by discharge pharmacy per pt's request  Follow Up Appointments: PCP, [facility], Homecare  Belongings: All sent with pt. Pt verifies that they are taking all belongings with them.   IV: discontinued.   Telemetry: discontinued.   Pt exhibits understanding of above discharge instructions; all questions answered.  Discharge Paperwork: faxed to discharge planner.

## 2018-06-08 NOTE — PLAN OF CARE
Problem: Patient Care Overview  Goal: Plan of Care/Patient Progress Review  RN:  1.Pt will remain hemodynamically stable.  2.Fluid status will be optimized.   D: Pt with HF , Afib, ablation 2008, THONG,HLD, PAD,hypothyrodism, anxiety, volume overloaded, had RHC and coronary angiogram 6/6 per MD note. Orthos BP,  Lisinopril and diuretics hold.    I: Monitored vitals and assessed pt status.   Bubba socks removed for the night per pt request.   A: A0x4. VSS, on 1-1.5 L n.c. Afib with HR at 83 . Afebrile. Denies any pain ,no palpitation, no nausea reported. R radial site CDI, no hematoma observed.  overnight     Temp:  [96.8  F (36  C)-98.4  F (36.9  C)] 96.8  F (36  C)  Pulse:  [83] 83  Heart Rate:  [] 83  Resp:  [18] 18  BP: ()/(50-87) 116/87  SpO2:  [97 %-99 %] 97 %      P: Continue to monitor Pt status and report changes to treatment team. Encourage fluid intake.  Likely will be dc today.

## 2018-06-09 ENCOUNTER — TELEPHONE (OUTPATIENT)
Dept: FAMILY MEDICINE | Facility: CLINIC | Age: 68
End: 2018-06-09

## 2018-06-09 NOTE — TELEPHONE ENCOUNTER
Returned clinic page at 3:50PM on 6/9/2018.    Spoke to Stanley Blount with [facility]. She is planning on visiting the patient and wanted some verbal orders. Requesting to:  - Resume [facility]  - PT/OT eval and treat  - INR recheck    Reviewed Clarke's chart and it appears entirely appropriate for the above orders. Provided verbal order over the phone. Instructed Stanley to call back if she requires further orders or clarification.    Andra Lind DO  PGY1 Family Medicine Resident  Pager: [phone]

## 2018-06-11 ENCOUNTER — TELEPHONE (OUTPATIENT)
Dept: FAMILY MEDICINE | Facility: CLINIC | Age: 68
End: 2018-06-11

## 2018-06-11 ENCOUNTER — PATIENT OUTREACH (OUTPATIENT)
Dept: FAMILY MEDICINE | Facility: CLINIC | Age: 68
End: 2018-06-11

## 2018-06-11 NOTE — TELEPHONE ENCOUNTER
INR Result: 2.1    Name of person that should receive call back: Stanley Blount     Relationship to patient: Home care nurse     Okay to leave a voicemail: Yes    Is an  needed: No

## 2018-06-11 NOTE — TELEPHONE ENCOUNTER
[facility]: Post-Discharge Note  SITUATION                                                      Admission: 6/4/2018  Discharge: 6/8/2018  Discharge Plan: Discharge home with home care     BACKGROUND                                                      Clarke Moreira is a 68 year old with a PMH that is pertinent for HFpEF , Atrial Fibrillation  s/p ablation 2008 , THONG, HTN , HLD, PAD , hypothyrodism, DM II , MDD/anxiety who presented for RHC and Coronary Angiogram and found to be volume overloaded.      #HFpEF exacerbation   HFpEF likely 2/2 HTN, HLD, DM2, THONG. Etiology for exacerbation is unclear but likely slow progression of disease process. Patient was volume overloaded on examination. Was diuresed 17lb with weight back to 370lb at discharge which appears to be new dry weight. Was discharged on a new regimen of Torsemide 50mg AM/50mg PM (This was held day prior to d/c due to orthostasis). Patient was discharged with instructions to continue taking at home. Also discharged on PTA Aldactone, Metoprolol, Lisinopril 5mg BID. A [facility] clinic consult was placed prior to discharge.       #A.fib s/p Ablation 2008 with recurrence (CHADSVASC 5)  Continued Metoprolol 100 mg qday and Warfarin. Will get INR checks with home care RN.    ASSESSMENT       RN called patient to follow up after recent discharge from the hospital. Patient reports swelling has not increased, no shortness of breath, and was able to  all medications. Discussed switched medication and no further questions at this time. Patient reported upcoming eye appointment and appointment with podiatry for a non-painful bruise on his right greater toe. Otherwise no issues or concerns following the angiogram.          PLAN                                                      Outpatient Plan:  Follow up in clinic with cardiology, home care to complete INR's, and primary care    Future Appointments  Date Time Provider Department Center    6/12/2018 11:00 AM Stevo Mathew OD EYE [facility]   6/14/2018 9:30 AM Jesús Lagos DPM [facility]   6/15/2018 12:30 PM  LAB [facility]   6/15/2018 1:00 PM Sybil Norman APRN [facility]   6/18/2018 11:00 AM [facility]   6/18/2018 11:20 AM Matthias Sanchez MD [facility]   6/21/2018 10:20 AM Lety Buenrostro PsyD [facility] Own[facility]   7/6/2018 10:40 AM Jesús Lagos DPM [facility]           Carla Gonzales RN

## 2018-06-12 ENCOUNTER — OFFICE VISIT (OUTPATIENT)
Dept: OPHTHALMOLOGY | Facility: CLINIC | Age: 68
End: 2018-06-12
Payer: COMMERCIAL

## 2018-06-12 DIAGNOSIS — H02.889 MEIBOMIAN GLAND DYSFUNCTION: ICD-10-CM

## 2018-06-12 DIAGNOSIS — E11.3292 MILD NONPROLIFERATIVE DIABETIC RETINOPATHY OF LEFT EYE WITHOUT MACULAR EDEMA ASSOCIATED WITH TYPE 2 DIABETES MELLITUS (H): Primary | ICD-10-CM

## 2018-06-12 DIAGNOSIS — H52.223 REGULAR ASTIGMATISM OF BOTH EYES: ICD-10-CM

## 2018-06-12 DIAGNOSIS — H18.599 CORNEAL DYSTROPHY, STROMAL: ICD-10-CM

## 2018-06-12 DIAGNOSIS — H25.13 NUCLEAR SCLEROSIS OF BOTH EYES: ICD-10-CM

## 2018-06-12 DIAGNOSIS — H53.10 SUBJECTIVE VISION DISTURBANCE, BILATERAL: ICD-10-CM

## 2018-06-12 DIAGNOSIS — H40.003 GLAUCOMA SUSPECT OF BOTH EYES: ICD-10-CM

## 2018-06-12 ASSESSMENT — REFRACTION_WEARINGRX
SPECS_TYPE: LAST MR
OS_ADD: +2.75
OS_SPHERE: -2.50
OD_SPHERE: -0.50
OS_SPHERE: -1.25
OD_CYLINDER: +1.50
OD_CYLINDER: +1.50
OD_AXIS: 002
OD_SPHERE: -1.25
OS_CYLINDER: +2.00
OS_CYLINDER: +1.75
OD_AXIS: 002
OS_ADD: +2.00
OD_ADD: +2.75
OD_ADD: +2.00
SPECS_TYPE: BIFOCAL
OS_AXIS: 009
OS_AXIS: 010

## 2018-06-12 ASSESSMENT — CUP TO DISC RATIO
OD_RATIO: 0.7
OS_RATIO: 0.6

## 2018-06-12 ASSESSMENT — VISUAL ACUITY
OS_CC: J1+-2
METHOD: SNELLEN - LINEAR
OS_CC: 20/50
OD_CC: 20/30
CORRECTION_TYPE: GLASSES
OD_CC: J1+-2

## 2018-06-12 ASSESSMENT — SLIT LAMP EXAM - LIDS
COMMENTS: 1+ MGD
COMMENTS: 1+ MGD

## 2018-06-12 ASSESSMENT — REFRACTION_MANIFEST
OD_CYLINDER: +1.50
OD_ADD: +2.25
OS_CYLINDER: +1.75
OD_AXIS: 010
OS_SPHERE: -1.00
OS_AXIS: 171
OS_ADD: +2.25
OD_SPHERE: -0.50

## 2018-06-12 ASSESSMENT — TONOMETRY
OS_IOP_MMHG: 16
IOP_METHOD: ICARE
OD_IOP_MMHG: 14

## 2018-06-12 ASSESSMENT — EXTERNAL EXAM - RIGHT EYE: OD_EXAM: NORMAL

## 2018-06-12 ASSESSMENT — CONF VISUAL FIELD
METHOD: COUNTING FINGERS
OD_NORMAL: 1
OS_NORMAL: 1

## 2018-06-12 ASSESSMENT — EXTERNAL EXAM - LEFT EYE: OS_EXAM: NORMAL

## 2018-06-12 NOTE — LETTER
June 12, 2018          Your patient, Clarke Moreira, was examined in the Ophthalmology Clinic today for a diabetic evaluation.     Examination of the right eye today shows: No diabetic retinopathy.    Examination of the left eye today shows: Non-proliferative diabetic retinopathy:  mild.    The condition of the eyes today is: stable.    I recommend follow-up examination in: 1 year.    I have encouraged the patient to continue working with you to maintain tight control of blood glucose and blood pressure.   Thank you for allowing us to participate in the care of this patient.     Sincerely,    Stevo Mathew, MARISOL, FAAO

## 2018-06-12 NOTE — PROGRESS NOTES
Assessment/Plan  (E11.6251) Mild nonproliferative diabetic retinopathy of left eye without macular edema associated with type 2 diabetes mellitus (H)  (primary encounter diagnosis)  Comment: Mild NPDR OS, no retinopathy OD  Plan:  Educated patient on clinical findings and the importance of continued management with primary care physician. Continue management as directed and return to clinic in 1 year for dilated exam, or sooner, as needed.    (H53.10) Subjective vision disturbance, bilateral  Comment: Subjective changes in image color, saturation, and vibrations of vision, transient, have resolved  Plan:  Continue management as indicated by Dr. Sanchez. Return to clinic for visual field testing if symptoms recur.    (H52.223) Regular astigmatism of both eyes  Comment: Mixed astigmatism with presbyopia OU  Plan: REFRACTION [40701]         Dispensed spectacle prescription for full time wear. Educated patient on possibility of adaptation period, if symptoms do not improve return to clinic for further testing.    (H40.003) Glaucoma suspect of both eyes  Comment: Physiological cupping OU, normal OCT  Plan: OCT Optic Nerve RNFL Spectralis OU (both eyes)         No treatment indicated. Monitor annually.    (H25.13) Nuclear sclerosis of both eyes  Comment: Not visually significant.  Plan:  No treatment indicated. Monitor annually.    (H02.89) Meibomian gland dysfunction  Comment: Symptomatic, corneal staining OU  Plan:  Recommended warm compresses twice each day for ten minutes and artificial tears as needed. Monitor annually, or sooner if symptoms worsen.    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 Monitor annually.    Return to clinic in 1 year for comprehensive eye exam.    Complete documentation of historical and exam elements from today's encounter can  be found in the full encounter summary report (not reduplicated in this  progress  note). I personally obtained the chief complaint(s) and history of present illness. I  confirmed and edited as necessary the review of systems, past medical/surgical  history, family history, social history, and examination findings as documented by  others; and I examined the patient myself. I personally reviewed the relevant tests,  images, and reports as documented above. I formulated and edited as necessary the  assessment and plan and discussed the findings and management plan with the  patient and family.    Stevo Mathew OD, FAAO

## 2018-06-12 NOTE — PROGRESS NOTES
Social Work Services Progress Note     SW consulted to assist with discharge needs.  Per [employer], they cannot schedule a same day ride.  They are offering a taxi service, which the pt cannot use due to mobility restrictions (pt requires lift into a vehicle.  Pt states his plan is to use the shuttle to [facility] and walk 2 blocks to home.  Discussed with medical team that this is not a safe transport option.  Pt does not have funds to pay for [employer] ride.  Received one time approval from management for covered wheelchair services.  Pt is aware and thankful to staff for being willing to cover his ride today.  Pt asks that in the future have [employer] planned a day in advance with lift and extra wide door on the van service.    No other needs, SW will set up [employer] ride for discharge to home.    JANE Arriaza, APSW  [address]   Phone: [phone]  Pager: [phone]  Unit: [phone]

## 2018-06-12 NOTE — TELEPHONE ENCOUNTER
"  Clinical Pharmacy Note  - Telephone encounter     Home care nurse calls today with a new INR result for Clarke CHINCHILLA Moreira  PCP:  Matthias Sanchez    Home Care nurse name: Stanley Blount RN    Current warfarin dose: 12.5 mg daily     New concerns: none      INR today in the home:  2.1     Lab Results   Component Value Date    INR 1.50 06/08/2018    INR 1.48 06/07/2018    INR 1.65 06/06/2018    INR 1.81 06/05/2018    INR 2.03 06/04/2018    INR 2.8 05/21/2018         Assessment and Plan:  At goal  No change      Warfarin dose: 12.5 mg daily     Follow up date: 4 weeks    Please see \"[facility] FM Anticoagulation Flowsheet\"       Home care nurse called and instructions give to VM    All medications were reviewed and found to be indicated, effective, safe and convenient unless drug therapy problems identified as described above.    Matthias Waller was provided our recommendations via routed note and  was available for supervision during this visit and is the authorizing prescriber for this visit through the pharmacist collaborative practice agreement.    Tj Palumbo, Pharm.D     "

## 2018-06-12 NOTE — MR AVS SNAPSHOT
After Visit Summary   6/12/2018    Clarke Moreira    MRN: [id number]           Patient Information     Date Of Birth          1950        Visit Information        Provider Department      6/12/2018 11:00 AM Stevo Mathew, MARISOL M [facility]hthal[facility]        Today's Diagnoses     Mild nonproliferative diabetic retinopathy of left eye without macular edema associated with type 2 diabetes mellitus (H)    -  1    Subjective vision disturbance, bilateral        Regular astigmatism of both eyes        Glaucoma suspect of both eyes        Nuclear sclerosis of both eyes        Meibomian gland dysfunction        Corneal dystrophy, stromal - Left Eye           Follow-ups after your visit        Your next 10 appointments already scheduled     Jun 14, 2018  9:30 AM CDT   (Arrive by 9:15 AM)   Return Visit with Jesús Lagos DPM    [facility] ([facility])    [address] [place] [address]  4th Floor  [facility] [address] [phone]            Rodney 15, 2018 12:30 PM CDT   Lab with [facility] LAB    Health Lab ([facility])    [address]  1st Floor  [facility] [address] [phone]            Rodney 15, 2018  1:00 PM CDT   (Arrive by 12:45 PM)   CORE RETURN with REAGAN Okeefe CNP   [facility] ([facility])    [address]  [address]  [facility] [address] [phone]            Jun 18, 2018 11:00 AM CDT   CONSULT with [facility] ([facility])    [address] [place],  [address]  [facility] [address]   [phone]            Jun 18, 2018 11:20 AM CDT   Return Visit with MD [facility] ([facility])    [address],  [address]  [facility] [address]   [phone]            Jun 21, 2018 10:20 AM CDT   Return Visit with [facility]  ([facility])     [address],  [address]  [facility] [address]   [phone]            2018 10:40 AM CDT   (Arrive by 10:25 AM)   RETURN FOOT/ANKLE with Jesús Lagos DPM   [facility] ([facility])    [address]  4th Floor  [facility] [id number] [phone]              Who to contact     Please call your clinic at [phone] to:    Ask questions about your health    Make or cancel appointments    Discuss your medicines    Learn about your test results    Speak to your doctor            Additional Information About Your Visit        MyChart Information     Abcamhart is an electronic gateway that provides easy, online access to your medical records. With [employer]errals, you can request a clinic appointment, read your test results, renew a prescription or communicate with your care team.     To sign up for [employer]ang [employer] visit the website at [email or web address]   You will be asked to enter the access code listed below, as well as some personal information. Please follow the directions to create your username and password.     Your access code is: [email or web address]  Expires: 2018  6:30 AM     Your access code will  in 90 days. If you need help or a new code, please contact your [facility] or call [phone] for assistance.        Care EveryWhere ID     This is your Care EveryWhere ID. This could be used by other organizations to access your [place] medical records  [id number]         Blood Pressure from Last 3 Encounters:   18 119/83   18 104/59   18 108/73    Weight from Last 3 Encounters:   18 (!) 167.8 kg (369 lb 14.9 oz)   18 (!) 174.9 kg (385 lb 9.6 oz)   18 (!) 173.7 kg (383 lb)              We Performed the Following     OCT Optic Nerve RNFL Spectralis OU (both eyes)     REFRACTION [95989]        Primary Care Provider Office Phone # Fax #    Peter  ASHLI Sanchez [phone]       [address] [address]        Equal Access to Services     [place] : Hadii aad ku hadraghulaly Cotton, shelton gutierrez, luisallyson navatisha banda, vasyl ahnender ember. So [facility] [phone].    ATENCIÓN: Si habla español, tiene a kim disposición servicios gratuitos de asistencia lingüística. Llame al [phone].    We comply with applicable federal civil rights laws and [place] laws. We do not discriminate on the basis of race, color, national origin, age, disability, sex, sexual orientation, or gender identity.            Thank you!     Thank you for choosing [facility] OPHTHALMOLOGY  for your care. Our goal is always to provide you with excellent care. Hearing back from our patients is one way we can continue to improve our services. Please take a few minutes to complete the written survey that you may receive in the mail after your visit with us. Thank you!             Your Updated Medication List - Protect others around you: Learn how to safely use, store and throw away your medicines at www.disposemymeds.org.          This list is accurate as of 6/12/18 12:57 PM.  Always use your most recent med list.                   Brand Name Dispense Instructions for use Diagnosis    aspirin 81 MG tablet     30 tablet    Take 1 tablet (81 mg) by mouth daily    Essential hypertension       atorvastatin 40 MG tablet    LIPITOR    30 tablet    Take 1 tablet (40 mg) by mouth daily    Type 2 diabetes mellitus without complication, without long-term current use of insulin (H)       blood glucose monitoring lancets     100 each    Use to test blood sugars 2 times daily or as directed.    Diabetes mellitus, type 2 (H)       blood glucose monitoring meter device kit     1 kit    Use to test blood sugars 2 times daily or as directed.    Type 2 diabetes mellitus with hyperglycemia, without long-term current use of insulin (H)       blood  glucose monitoring test strip    ONETOUCH ULTRA    100 strip    Use to test blood sugars 2 times daily or as directed.    Diabetes mellitus, type 2 (H)       levothyroxine 175 MCG tablet    SYNTHROID/LEVOTHROID    30 tablet    Take 1 tablet (175 mcg) by mouth daily    Hypothyroidism, unspecified type       lisinopril 5 MG tablet    PRINIVIL/ZESTRIL    60 tablet    Take 1 tablet (5 mg) by mouth 2 times daily    Chronic systolic congestive heart failure (H)       metFORMIN 500 MG tablet    GLUCOPHAGE    60 tablet    Take 1 tablet (500 mg) by mouth 2 times daily (with meals)    Type 2 diabetes mellitus without complication, without long-term current use of insulin (H)       metoprolol succinate 100 MG 24 hr tablet    TOPROL-XL    30 tablet    Take 1 tablet (100 mg) by mouth daily    Essential hypertension with goal blood pressure less than 140/90       multivitamin, therapeutic with minerals Tabs tablet     30 each    Take 1 tablet by mouth daily    Type 2 diabetes mellitus without complication, without long-term current use of insulin (H)       omega 3 1000 MG Caps     30 capsule    Take 1 g by mouth daily    Hyperlipidemia LDL goal <100       * order for DME     2 each    Equipment being ordered: Other: Velcro Compression stockings.  Treatment Diagnosis: bilateral lymphedema.    Lymphedema of extremity       * order for DME     1 Units    Equipment being ordered: Bariatric Lift chair Diagnosis - morbid obesity, CHF, Lymphedema  Fax to Ne from [employer] at [phone].    Chronic systolic congestive heart failure (H), Lymphedema, Morbid obesity (H)       polyethylene glycol Packet    MIRALAX/GLYCOLAX    15 packet    Take 17 g by mouth daily as needed for constipation    Constipation, unspecified constipation type       potassium chloride SA 20 MEQ CR tablet    K-DUR/KLOR-CON M    60 tablet    Take 40 meq in the morning and 20 meq in the afternoons.    Hypokalemia       senna-docusate 8.6-50 MG per tablet     SENOKOT-S;PERICOLACE    60 tablet    Take 1-2 tablets by mouth 2 times daily as needed for constipation    Constipation, unspecified constipation type       spironolactone 25 MG tablet    ALDACTONE    30 tablet    Take 1 tablet (25 mg) by mouth daily    Chronic systolic congestive heart failure (H)       tamsulosin 0.4 MG capsule    FLOMAX    30 capsule    Take 1 capsule (0.4 mg) by mouth daily    Urinary retention       torsemide 100 MG tablet    DEMADEX    60 tablet    Take 0.5 tablets (50 mg) by mouth 2 times daily    (HFpEF) heart failure with preserved ejection fraction (H)       vitamin B complex with vitamin C Tabs tablet      Take 1 tablet by mouth daily        VITAMIN C PO           VITAMIN E COMPLEX PO           warfarin 5 MG tablet    COUMADIN    75 tablet    Take 2.5 tablets (12.5 mg) by mouth daily    Persistent atrial fibrillation (H)       * Notice:  This list has 2 medication(s) that are the same as other medications prescribed for you. Read the directions carefully, and ask your doctor or other care provider to review them with you.

## 2018-06-13 ENCOUNTER — PRE VISIT (OUTPATIENT)
Dept: CARDIOLOGY | Facility: CLINIC | Age: 68
End: 2018-06-13

## 2018-06-13 DIAGNOSIS — I50.22 CHRONIC SYSTOLIC HEART FAILURE (H): Primary | ICD-10-CM

## 2018-06-14 ENCOUNTER — OFFICE VISIT (OUTPATIENT)
Dept: WOUND CARE | Facility: CLINIC | Age: 68
End: 2018-06-14
Payer: COMMERCIAL

## 2018-06-14 DIAGNOSIS — L97.511 SKIN ULCER OF TOE OF RIGHT FOOT, LIMITED TO BREAKDOWN OF SKIN (H): ICD-10-CM

## 2018-06-14 DIAGNOSIS — E11.65 TYPE 2 DIABETES MELLITUS WITH HYPERGLYCEMIA, WITHOUT LONG-TERM CURRENT USE OF INSULIN (H): ICD-10-CM

## 2018-06-14 DIAGNOSIS — I87.8 VENOUS STASIS: ICD-10-CM

## 2018-06-14 DIAGNOSIS — M76.61 ACHILLES TENDINITIS OF RIGHT LOWER EXTREMITY: Primary | ICD-10-CM

## 2018-06-14 RX ORDER — CEPHALEXIN 500 MG/1
CAPSULE ORAL
Refills: 0 | COMMUNITY
Start: 2018-01-10 | End: 2018-06-18

## 2018-06-14 RX ORDER — NYSTATIN 100000 U/G
CREAM TOPICAL
Refills: 1 | COMMUNITY
Start: 2018-02-06 | End: 2018-09-10

## 2018-06-14 ASSESSMENT — PAIN SCALES - GENERAL: PAINLEVEL: NO PAIN (0)

## 2018-06-14 NOTE — LETTER
6/14/2018       RE: Clarke Moreira  [address]  [facility] [address]     Dear Colleague,    Thank you for referring your patient, Clarke Moreira, to the [facility] WOUND [facility]ARE at [facility]. Please see a copy of my visit note below.    Chief Complaint:   Chief Complaint   Patient presents with     WOUND CARE     Pt here for wound care on great toe on right toe        No Known Allergies      Subjective: Clarke is a 68 year old male who presents to the clinic today for a follow up of right hallux ulcer. He relates that this has been present since his hospitalization at the beginning of June. He relates that he feels like the shoe is too large and it continually flops off when he is walking. He thinks that the toe is sliding in the shoe. He relates that over the last few days, the discoloration in the toe has been increasing. No pain to the area. He also is having Achilles pain on the right ankle. He relates that the left feels good, however the right continues to ache.     Objective  Data Unavailable Data Unavailable Data Unavailable Data Unavailable Data Unavailable 0 lbs 0 oz    A ulcer wound is noted at right  distal hallux measuring 0.4cm x 0.3cm x 0.1cm    Sosa Classification: 2    Wound base: Red/Granulation  Dry. This was discovered after sharp debridement of hyperkeratotic tissue with intrasubstance bleeding.     Edges: hyperkeratotic    Drainage: none    Odor: no    Undermining: no    Bone Exposure: No    Clinical Signs of Infection: No    After obtaining patient consent, the wound was irrigated with copious amounts of saline. A scalpel was then used to debride the wound into the dermal tissue. The wound edges were debrided to healthy, bleeding tissue. Given the patient's lack of sensation, no anesthesia was necessary for the procedure.   Pain noted in the right Achilles tendon with palpation of the watershed area. MMT 5/5 noted with no tendon  voids.   I evaluated the shoe compared to his foot and the shoe is 1.5 to 2 sizes too large for the foot.   Improved edema to BL legs.     Assessment: BL PVD  DM2 with neuropathy.  Right Achilles tendonitis  Right hallux dry ulceration    Plan:   - Pt seen and evaluated  - Wound was debrided as described. Area covered with a small bordered gauze.   - Shoe is too large and creating sliding and causing the toe to abut in the shoe. I have recommended that he use a DH2 until the shoe is fixed. I called the orthotic lab and these can be fixed to get the correct size. I gave him the scheduling number for this.   - DH2 dispensed.   - Order written for PT for the Achilles tendonitis.   - Pt to return to clinic in 3 weeks for nail care.       Again, thank you for allowing me to participate in the care of your patient.      Sincerely,    Jesús Lagos DPM

## 2018-06-14 NOTE — NURSING NOTE
Chief Complaint   Patient presents with     WOUND CARE     Pt here for wound care on great toe on right toe       There were no vitals filed for this visit.    There is no height or weight on file to calculate BMI.      ANDREW Platt, EMT                  No vitals taken per provider

## 2018-06-14 NOTE — MR AVS SNAPSHOT
After Visit Summary   6/14/2018    Clarke Moreira    MRN: [id number]           Patient Information     Date Of Birth          1950        Visit Information        Provider Department      6/14/2018 9:30 AM Jesús Lagos DPM [facility]        Today's Diagnoses     Achilles tendinitis of right lower extremity    -  1    Skin ulcer of toe of right foot, limited to breakdown of skin (H)        Type 2 diabetes mellitus with hyperglycemia, without long-term current use of insulin (H)        Venous stasis           Follow-ups after your visit        Additional Services     PHYSICAL THERAPY REFERRAL (External-Prints)       Physical Therapy Referral                  Your next 10 appointments already scheduled     Rodney 15, 2018 12:30 PM CDT   Lab with  LAB   [facility] ([facility])    [address]  1st Floor  [facility] [address] [phone]            Rodney 15, 2018  1:00 PM CDT   (Arrive by 12:45 PM)   CORE RETURN with REAGAN Okeefe CNP   [facility] ([facility])    [address]  [address] [place] [address] [phone]            Jun 18, 2018 11:00 AM CDT   CONSULT with Tj Palumbo   [facility] ([facility])    [address] [place],  [address]  [facility] [address]   [phone]            Jun 18, 2018 11:20 AM CDT   Return Visit with Matthias Sanchez MD   [facility] ([facility])    [address] [place],  [address]  [facility] [address]   [phone]            Jun 21, 2018 10:20 AM CDT   Return Visit with Lety Buenrostro PsyD   [facility] ([facility])    [address],  [address]  [facility] [address]   [phone]            Jul 06, 2018 10:40 AM CDT   (Arrive by 10:25 AM)   RETURN FOOT/ANKLE with Jesús Lagos DPM   [facility] ([facility]    [address]  4th Floor  [facility] [address] [phone]              Future tests that were ordered for you today     Open Future Orders        Priority Expected Expires Ordered    Basic metabolic panel Routine 6/15/2018 2018 2018            Who to contact     Please call your clinic at [phone] to:    Ask questions about your health    Make or cancel appointments    Discuss your medicines    Learn about your test results    Speak to your doctor            Additional Information About Your Visit        [email or web address] Information     Baitianshi is an electronic gateway that provides easy, online access to your medical records. With Baitianshi, you can request a clinic appointment, read your test results, renew a prescription or communicate with your care team.     To sign up for Baitianshi visit the website at [email or web address]   You will be asked to enter the access code listed below, as well as some personal information. Please follow the directions to create your username and password.     Your access code is: [email or web address]  Expires: 2018  6:30 AM     Your access code will  in 90 days. If you need help or a new code, please contact your [facility] or call [phone] for assistance.        Care EveryWhere ID     This is your Care EveryWhere ID. This could be used by other organizations to access your [place] medical records  [id number]         Blood Pressure from Last 3 Encounters:   18 119/83   18 104/59   18 108/73    Weight from Last 3 Encounters:   18 (!) 369 lb 14.9 oz   18 (!) 385 lb 9.6 oz   18 (!) 383 lb              We Performed the Following     DEBRIDEMENT WOUND UP TO 20 SQ CM     PHYSICAL THERAPY REFERRAL (External-Prints)        Primary Care Provider Office Phone # Fax #    Matthias Sanchez [phone]       [address]  [address]        Equal Access to Services     [facility]: Hadii aad ku hadraghulaly Nerydillan, wazenaidada baobernadineha, qaybta kaalmavasyl meyers. So [facility] [phone].    ATENCIÓN: Si habla español, tiene a kim disposición servicios gratuitos de asistencia lingüística. Kyleame al [phone].    We comply with applicable federal civil rights laws and [place] laws. We do not discriminate on the basis of race, color, national origin, age, disability, sex, sexual orientation, or gender identity.            Thank you!     Thank you for choosing [facility]  for your care. Our goal is always to provide you with excellent care. Hearing back from our patients is one way we can continue to improve our services. Please take a few minutes to complete the written survey that you may receive in the mail after your visit with us. Thank you!             Your Updated Medication List - Protect others around you: Learn how to safely use, store and throw away your medicines at www.disposemymeds.org.          This list is accurate as of 6/14/18  9:59 AM.  Always use your most recent med list.                   Brand Name Dispense Instructions for use Diagnosis    aspirin 81 MG tablet     30 tablet    Take 1 tablet (81 mg) by mouth daily    Essential hypertension       atorvastatin 40 MG tablet    LIPITOR    30 tablet    Take 1 tablet (40 mg) by mouth daily    Type 2 diabetes mellitus without complication, without long-term current use of insulin (H)       blood glucose monitoring lancets     100 each    Use to test blood sugars 2 times daily or as directed.    Diabetes mellitus, type 2 (H)       blood glucose monitoring meter device kit     1 kit    Use to test blood sugars 2 times daily or as directed.    Type 2 diabetes mellitus with hyperglycemia, without long-term current use of insulin (H)       blood glucose monitoring test strip    ONETOUCH ULTRA    100 strip    Use to test blood sugars  2 times daily or as directed.    Diabetes mellitus, type 2 (H)       cephALEXin 500 MG capsule    KEFLEX          levothyroxine 175 MCG tablet    SYNTHROID/LEVOTHROID    30 tablet    Take 1 tablet (175 mcg) by mouth daily    Hypothyroidism, unspecified type       lisinopril 5 MG tablet    PRINIVIL/ZESTRIL    60 tablet    Take 1 tablet (5 mg) by mouth 2 times daily    Chronic systolic congestive heart failure (H)       metFORMIN 500 MG tablet    GLUCOPHAGE    60 tablet    Take 1 tablet (500 mg) by mouth 2 times daily (with meals)    Type 2 diabetes mellitus without complication, without long-term current use of insulin (H)       metoprolol succinate 100 MG 24 hr tablet    TOPROL-XL    30 tablet    Take 1 tablet (100 mg) by mouth daily    Essential hypertension with goal blood pressure less than 140/90       multivitamin, therapeutic with minerals Tabs tablet     30 each    Take 1 tablet by mouth daily    Type 2 diabetes mellitus without complication, without long-term current use of insulin (H)       nystatin cream    MYCOSTATIN          omega 3 1000 MG Caps     30 capsule    Take 1 g by mouth daily    Hyperlipidemia LDL goal <100       * order for DME     2 each    Equipment being ordered: Other: Velcro Compression stockings.  Treatment Diagnosis: bilateral lymphedema.    Lymphedema of extremity       * order for DME     1 Units    Equipment being ordered: Bariatric Lift chair Diagnosis - morbid obesity, CHF, Lymphedema  Fax to Ne from [employer] at [phone].    Chronic systolic congestive heart failure (H), Lymphedema, Morbid obesity (H)       polyethylene glycol Packet    MIRALAX/GLYCOLAX    15 packet    Take 17 g by mouth daily as needed for constipation    Constipation, unspecified constipation type       potassium chloride SA 20 MEQ CR tablet    K-DUR/KLOR-CON M    60 tablet    Take 40 meq in the morning and 20 meq in the afternoons.    Hypokalemia       senna-docusate 8.6-50 MG per tablet     SENOKOT-S;PERICOLACE    60 tablet    Take 1-2 tablets by mouth 2 times daily as needed for constipation    Constipation, unspecified constipation type       spironolactone 25 MG tablet    ALDACTONE    30 tablet    Take 1 tablet (25 mg) by mouth daily    Chronic systolic congestive heart failure (H)       tamsulosin 0.4 MG capsule    FLOMAX    30 capsule    Take 1 capsule (0.4 mg) by mouth daily    Urinary retention       torsemide 100 MG tablet    DEMADEX    60 tablet    Take 0.5 tablets (50 mg) by mouth 2 times daily    (HFpEF) heart failure with preserved ejection fraction (H)       vitamin B complex with vitamin C Tabs tablet      Take 1 tablet by mouth daily        VITAMIN C PO           VITAMIN E COMPLEX PO           warfarin 5 MG tablet    COUMADIN    75 tablet    Take 2.5 tablets (12.5 mg) by mouth daily    Persistent atrial fibrillation (H)       * Notice:  This list has 2 medication(s) that are the same as other medications prescribed for you. Read the directions carefully, and ask your doctor or other care provider to review them with you.

## 2018-06-14 NOTE — PROGRESS NOTES
Chief Complaint:   Chief Complaint   Patient presents with     WOUND CARE     Pt here for wound care on great toe on right toe        No Known Allergies      Subjective: Clarke is a 68 year old male who presents to the clinic today for a follow up of right hallux ulcer. He relates that this has been present since his hospitalization at the beginning of June. He relates that he feels like the shoe is too large and it continually flops off when he is walking. He thinks that the toe is sliding in the shoe. He relates that over the last few days, the discoloration in the toe has been increasing. No pain to the area. He also is having Achilles pain on the right ankle. He relates that the left feels good, however the right continues to ache.     Objective  Data Unavailable Data Unavailable Data Unavailable Data Unavailable Data Unavailable 0 lbs 0 oz    A ulcer wound is noted at right  distal hallux measuring 0.4cm x 0.3cm x 0.1cm    Sosa Classification: 2    Wound base: Red/Granulation  Dry. This was discovered after sharp debridement of hyperkeratotic tissue with intrasubstance bleeding.     Edges: hyperkeratotic    Drainage: none    Odor: no    Undermining: no    Bone Exposure: No    Clinical Signs of Infection: No    After obtaining patient consent, the wound was irrigated with copious amounts of saline. A scalpel was then used to debride the wound into the dermal tissue. The wound edges were debrided to healthy, bleeding tissue. Given the patient's lack of sensation, no anesthesia was necessary for the procedure.   Pain noted in the right Achilles tendon with palpation of the watershed area. MMT 5/5 noted with no tendon voids.   I evaluated the shoe compared to his foot and the shoe is 1.5 to 2 sizes too large for the foot.   Improved edema to BL legs.     Assessment: BL PVD  DM2 with neuropathy.  Right Achilles tendonitis  Right hallux dry ulceration    Plan:   - Pt seen and evaluated  - Wound was debrided as  described. Area covered with a small bordered gauze.   - Shoe is too large and creating sliding and causing the toe to abut in the shoe. I have recommended that he use a DH2 until the shoe is fixed. I called the orthotic lab and these can be fixed to get the correct size. I gave him the scheduling number for this.   - DH2 dispensed.   - Order written for PT for the Achilles tendonitis.   - Pt to return to clinic in 3 weeks for nail care.

## 2018-06-15 ENCOUNTER — OFFICE VISIT (OUTPATIENT)
Dept: CARDIOLOGY | Facility: CLINIC | Age: 68
End: 2018-06-15
Attending: NURSE PRACTITIONER
Payer: COMMERCIAL

## 2018-06-15 ENCOUNTER — TELEPHONE (OUTPATIENT)
Dept: FAMILY MEDICINE | Facility: CLINIC | Age: 68
End: 2018-06-15

## 2018-06-15 VITALS
DIASTOLIC BLOOD PRESSURE: 59 MMHG | OXYGEN SATURATION: 96 % | HEART RATE: 86 BPM | HEIGHT: 72 IN | BODY MASS INDEX: 42.66 KG/M2 | SYSTOLIC BLOOD PRESSURE: 97 MMHG | WEIGHT: 315 LBS

## 2018-06-15 DIAGNOSIS — I50.22 CHRONIC SYSTOLIC HEART FAILURE (H): ICD-10-CM

## 2018-06-15 DIAGNOSIS — I50.22 CHRONIC SYSTOLIC CONGESTIVE HEART FAILURE (H): Primary | ICD-10-CM

## 2018-06-15 DIAGNOSIS — E87.6 HYPOKALEMIA: ICD-10-CM

## 2018-06-15 LAB
ANION GAP SERPL CALCULATED.3IONS-SCNC: 10 MMOL/L (ref 3–14)
BUN SERPL-MCNC: 19 MG/DL (ref 7–30)
CALCIUM SERPL-MCNC: 9.2 MG/DL (ref 8.5–10.1)
CHLORIDE SERPL-SCNC: 102 MMOL/L (ref 94–109)
CO2 SERPL-SCNC: 27 MMOL/L (ref 20–32)
CREAT SERPL-MCNC: 0.98 MG/DL (ref 0.66–1.25)
GFR SERPL CREATININE-BSD FRML MDRD: 76 ML/MIN/1.7M2
GLUCOSE SERPL-MCNC: 101 MG/DL (ref 70–99)
POTASSIUM SERPL-SCNC: 4.3 MMOL/L (ref 3.4–5.3)
SODIUM SERPL-SCNC: 138 MMOL/L (ref 133–144)

## 2018-06-15 PROCEDURE — 36415 COLL VENOUS BLD VENIPUNCTURE: CPT | Performed by: NURSE PRACTITIONER

## 2018-06-15 PROCEDURE — 80048 BASIC METABOLIC PNL TOTAL CA: CPT | Performed by: NURSE PRACTITIONER

## 2018-06-15 PROCEDURE — 99214 OFFICE O/P EST MOD 30 MIN: CPT | Mod: ZP | Performed by: NURSE PRACTITIONER

## 2018-06-15 PROCEDURE — G0463 HOSPITAL OUTPT CLINIC VISIT: HCPCS | Mod: ZF

## 2018-06-15 RX ORDER — SPIRONOLACTONE 25 MG/1
25 TABLET ORAL 2 TIMES DAILY
Qty: 30 TABLET | Refills: 3 | Status: SHIPPED | OUTPATIENT
Start: 2018-06-15 | End: 2018-06-18

## 2018-06-15 RX ORDER — POTASSIUM CHLORIDE 1500 MG/1
TABLET, EXTENDED RELEASE ORAL
Qty: 60 TABLET | Refills: 11 | Status: SHIPPED | OUTPATIENT
Start: 2018-06-15 | End: 2018-06-18

## 2018-06-15 RX ORDER — LISINOPRIL 5 MG/1
5 TABLET ORAL DAILY
Qty: 60 TABLET | Refills: 3 | Status: ON HOLD | OUTPATIENT
Start: 2018-06-15 | End: 2019-03-16

## 2018-06-15 ASSESSMENT — PAIN SCALES - GENERAL: PAINLEVEL: NO PAIN (0)

## 2018-06-15 NOTE — TELEPHONE ENCOUNTER
[facility] phone call message - order or referral request for patient:     Order or referral being requested: orders for OT for Right upper extremities.  Home exercise and falls and energy conservation.  For 3x a month for 1 month and 2x a month for one month.      Additional Comments: Please call home care nurse    OK to leave a message on voice mail? Yes    Primary language: English      needed? No    Call taken on Elinor 15, 2018 at 9:36 AM by Jhoana Morgan

## 2018-06-15 NOTE — NURSING NOTE
Chief Complaint   Patient presents with     Follow Up For     67 year old new CORE pt, acute on chronic HFrEF: Fartun patient. labs prior     Vitals were taken and medications were reconciled.    Cat Case MA    12:45 PM

## 2018-06-15 NOTE — MR AVS SNAPSHOT
"              After Visit Summary   6/15/2018    Clarke Moreira    MRN: [id number]           Patient Information     Date Of Birth          1950        Visit Information        Provider Department      6/15/2018 1:00 PM Sybil Norman, APRN CNP [facility]        Today's Diagnoses     Chronic systolic congestive heart failure (H)    -  1    Hypokalemia          Care Instructions    You were seen today in the Cardiovascular Clinic at the [facility].     Cardiology Providers you saw during your visit: Alisha KIRK CNP       1. Increase spironolactone to 25 mg twice a day  2. Decrease potassium to 20 MEQ twice a day  3. We will set you up with cardiocom scale.   4. Follow up in 1 month with Alisha Norman and labs prior.   5. Labs to be drawn next week at [facility].   6. Once you start weighing yourself with cardiocom, if you gain 3 lbs in one night or 5 lbs in 1 week, take an additional dose of torsemide (half of tablet to equal 50 mg)and call us to let us know.     Please limit your fluid intake to 2 L (64 ounces) daily.  2 Liters a day = 8.5 cups, or 72 ounces.  Please limit your salt intake to 2 grams a day or less.    If you gain 2# in 24 hours or 5# in one week call Amira Doll RN so we can adjust your medications as needed over the phone.    Please feel free to call me with any questions or concerns.      Amira Doll RN BSN   [facility]  Cardiology Care Coordinator-Heart Failure Clinic    Questions and [phone].   First press #1 for the [employer] and then press #3 for \"Medical Questions\" to reach us Cardiology Nurses.     On Call Cardiologist for after hours or on weekends: [phone]   option #4 and ask to speak to the on-call Cardiologist. Inform them you are a CORE/heart failure patient at the [facility].        If you need a medication refill please contact your pharmacy.  Please allow 3 business days for your refill to be " completed.  _______________________________________________________  C.O.R.E. CLINIC Cardiomyopathy, Optimization, Rehabilitation, Education   The C.O.R.E. CLINIC is a heart failure specialty clinic within the [facility] where you will work with specialized nurse practitioners dedicated to helping patients with heart failure carefully adjust medications, receive education, and learn who and when to call if symptoms develop. They specialize in helping you better understand your condition, slow the progression of your disease, improve the length and quality of your life, help you detect future heart problems before they become life threatening, and avoid hospitalizations.  As always, thank you for trusting us with your health care needs!                Follow-ups after your visit        Additional Services     Follow-Up with [facility] Clinic       chencho tim in one month                  Your next 10 appointments already scheduled     Jun 18, 2018 11:00 AM CDT   CONSULT with [facility] ([facility])    [address],  [place] [address]   [phone]            Jun 18, 2018 11:20 AM CDT   Return Visit with [facility] ([facility])    [address],  [place] [id number]   [phone]            Jun 21, 2018 10:20 AM CDT   [facility] Visit with [facility] ([facility])    [address],  [place] [id number]   [phone]            Jul 06, 2018 10:40 AM CDT   (Arrive by 10:25 AM)   RETURN FOOT/ANKLE with Jesús Lagos DPM    [facility] ([facility])    [address] [place] [address] [phone] [phone]            Jul 18, 2018  1:00 PM CDT   Lab with [facility]    [facility] ([facility])    [address]  1st Floor  [facility] [address]   [phone]            Jul 18, 2018  1:30 PM CDT   (Arrive by 1:15 PM)   CORE RETURN with REAGAN Okeefe CNP   [facility] ([facility])    [address]  [address]  [place] [address]   [phone]              Future tests that were ordered for you today     Open Future Orders        Priority Expected Expires Ordered    Basic metabolic panel Routine 6/21/2018 6/25/2018 6/15/2018    Follow-Up with [facility] Clinic Routine 7/18/2018 9/20/2018 6/15/2018            Who to contact     If you have questions or need follow up information about today's clinic visit or your schedule please contact [facility] directly at [phone].  Normal or non-critical lab and imaging results will be communicated to you by [employer], letter or phone within 4 business days after the clinic has received the results. If you do not hear from us within 7 days, please contact the clinic through [employer] or phone. If you have a critical or abnormal lab result, we will notify you by phone as soon as possible.  Submit refill requests through [employer] or call your pharmacy and they will forward the refill request to us. Please allow 3 business days for your refill to be completed.          Additional Information About Your Visit        MyChart Information     [employer] lets you send messages to your doctor, view your test results, renew your prescriptions, schedule appointments and more. To sign up, go to [email or web address] . Click on \"Log in\" on the left side of the screen, which will take you to the Welcome page. Then click on \"Sign up Now\" on the right side of the page.     You will be asked to enter the access code listed below, as well as some personal information. Please follow the directions to create your username and password.     Your access code is: [email or web address]  Expires: 8/29/2018  6:30 AM     Your access " code will  in 90 days. If you need help or a new code, please call your [facility] clinic or [phone].        Care EveryWhere ID     This is your Care EveryWhere ID. This could be used by other organizations to access your [facility] medical records  [id number]        Your Vitals Were     Pulse Height Pulse Oximetry BMI (Body Mass Index)          86 1.829 m (6') 96% 51.28 kg/m2         Blood Pressure from Last 3 Encounters:   06/15/18 97/59   18 119/83   18 104/59    Weight from Last 3 Encounters:   06/15/18 (!) 171.5 kg (378 lb 1.6 oz)   18 (!) 167.8 kg (369 lb 14.9 oz)   18 (!) 174.9 kg (385 lb 9.6 oz)                 Today's Medication Changes          These changes are accurate as of 6/15/18  2:47 PM.  If you have any questions, ask your nurse or doctor.               These medicines have changed or have updated prescriptions.        Dose/Directions    lisinopril 5 MG tablet   Commonly known as:  PRINIVIL/ZESTRIL   This may have changed:    - when to take this  - additional instructions   Used for:  Chronic systolic congestive heart failure (H)   Changed by:  Sybil Norman APRN CNP        Dose:  5 mg   Take 1 tablet (5 mg) by mouth daily Take in Pm   Quantity:  60 tablet   Refills:  3       potassium chloride SA 20 MEQ CR tablet   Commonly known as:  K-DUR/KLOR-CON M   Indication:  hypokalemia   This may have changed:  additional instructions   Used for:  Hypokalemia   Changed by:  Sybil Norman APRN CNP        Take 20 meq in the morning and 20 meq in the afternoons.   Quantity:  60 tablet   Refills:  11       spironolactone 25 MG tablet   Commonly known as:  ALDACTONE   This may have changed:  when to take this   Used for:  Chronic systolic congestive heart failure (H)   Changed by:  Sybil Norman APRN CNP        Dose:  25 mg   Take 1 tablet (25 mg) by mouth 2 times daily   Quantity:  30 tablet   Refills:  3            Where to get your medicines      These medications  were sent to [id number] - [place], [place] - [address]  [address] [facility] [address]     Phone:  [phone]     lisinopril 5 MG tablet    potassium chloride SA 20 MEQ CR tablet    spironolactone 25 MG tablet                Primary Care Provider Office Phone # Fax #    Matthias Sanchez [phone]       [address]  [facility] [address]        Equal Access to Services     TYLER [place] : Hadii aad ku hadasho Soomaali, waaxda luqadaha, qaybta kaalmada adeegyada, waxay idiin hayaan adeeg shelbi casarez . So [facility] [phone].    ATENCIÓN: Si habla español, tiene a kim disposición servicios gratuitos de asistencia lingüística. [facility] [phone].    We comply with applicable federal civil rights laws and [place] laws. We do not discriminate on the basis of race, color, national origin, age, disability, sex, sexual orientation, or gender identity.            Thank you!     Thank you for choosing [facility]  for your care. Our goal is always to provide you with excellent care. Hearing back from our patients is one way we can continue to improve our services. Please take a few minutes to complete the written survey that you may receive in the mail after your visit with us. Thank you!             Your Updated Medication List - Protect others around you: Learn how to safely use, store and throw away your medicines at www.disposemymeds.org.          This list is accurate as of 6/15/18  2:47 PM.  Always use your most recent med list.                   Brand Name Dispense Instructions for use Diagnosis    aspirin 81 MG tablet     30 tablet    Take 1 tablet (81 mg) by mouth daily    Essential hypertension       atorvastatin 40 MG tablet    LIPITOR    30 tablet    Take 1 tablet (40 mg) by mouth daily    Type 2 diabetes mellitus without complication, without long-term current use of insulin (H)       blood glucose monitoring lancets     100 each    Use to  test blood sugars 2 times daily or as directed.    Diabetes mellitus, type 2 (H)       blood glucose monitoring meter device kit     1 kit    Use to test blood sugars 2 times daily or as directed.    Type 2 diabetes mellitus with hyperglycemia, without long-term current use of insulin (H)       blood glucose monitoring test strip    ONETOUCH ULTRA    100 strip    Use to test blood sugars 2 times daily or as directed.    Diabetes mellitus, type 2 (H)       cephALEXin 500 MG capsule    KEFLEX          levothyroxine 175 MCG tablet    SYNTHROID/LEVOTHROID    30 tablet    Take 1 tablet (175 mcg) by mouth daily    Hypothyroidism, unspecified type       lisinopril 5 MG tablet    PRINIVIL/ZESTRIL    60 tablet    Take 1 tablet (5 mg) by mouth daily Take in Pm    Chronic systolic congestive heart failure (H)       metFORMIN 500 MG tablet    GLUCOPHAGE    60 tablet    Take 1 tablet (500 mg) by mouth 2 times daily (with meals)    Type 2 diabetes mellitus without complication, without long-term current use of insulin (H)       metoprolol succinate 100 MG 24 hr tablet    TOPROL-XL    30 tablet    Take 1 tablet (100 mg) by mouth daily    Essential hypertension with goal blood pressure less than 140/90       multivitamin, therapeutic with minerals Tabs tablet     30 each    Take 1 tablet by mouth daily    Type 2 diabetes mellitus without complication, without long-term current use of insulin (H)       nystatin cream    MYCOSTATIN          omega 3 1000 MG Caps     30 capsule    Take 1 g by mouth daily    Hyperlipidemia LDL goal <100       * order for DME     2 each    Equipment being ordered: Other: Velcro Compression stockings.  Treatment Diagnosis: bilateral lymphedema.    Lymphedema of extremity       * order for DME     1 Units    Equipment being ordered: Bariatric Lift chair Diagnosis - morbid obesity, CHF, Lymphedema  Fax to Ne from [employer] at [phone].    Chronic systolic congestive heart failure (H), Lymphedema,  Morbid obesity (H)       polyethylene glycol Packet    MIRALAX/GLYCOLAX    15 packet    Take 17 g by mouth daily as needed for constipation    Constipation, unspecified constipation type       potassium chloride SA 20 MEQ CR tablet    K-DUR/KLOR-CON M    60 tablet    Take 20 meq in the morning and 20 meq in the afternoons.    Hypokalemia       senna-docusate 8.6-50 MG per tablet    SENOKOT-S;PERICOLACE    60 tablet    Take 1-2 tablets by mouth 2 times daily as needed for constipation    Constipation, unspecified constipation type       spironolactone 25 MG tablet    ALDACTONE    30 tablet    Take 1 tablet (25 mg) by mouth 2 times daily    Chronic systolic congestive heart failure (H)       tamsulosin 0.4 MG capsule    FLOMAX    30 capsule    Take 1 capsule (0.4 mg) by mouth daily    Urinary retention       torsemide 100 MG tablet    DEMADEX    60 tablet    Take 0.5 tablets (50 mg) by mouth 2 times daily    (HFpEF) heart failure with preserved ejection fraction (H)       vitamin B complex with vitamin C Tabs tablet      Take 1 tablet by mouth daily        VITAMIN C PO           VITAMIN E COMPLEX PO           warfarin 5 MG tablet    COUMADIN    75 tablet    Take 2.5 tablets (12.5 mg) by mouth daily    Persistent atrial fibrillation (H)       * Notice:  This list has 2 medication(s) that are the same as other medications prescribed for you. Read the directions carefully, and ask your doctor or other care provider to review them with you.

## 2018-06-15 NOTE — PROGRESS NOTES
"HPI:   Mr. Moreira is a 68 year old male with a past medical history including prior history of systolic heart failure, most recent LVEF normal, also hx of morbid obesity, atrial fibrillation s/p ablation 2008, THONG/alveolar hypoventilation, HTN, dyslipidemia, ?CAD - unknown details and patient denies any hx of coronary angiography, PAD, hypothyroidism, type2 DM, depression, anxiety, BPH. Presents to clinic for CORE follow-up.    He was admitted 1/5/18-1/1-/18 for cellulitis and decompensated heart failure. He was diuresed approx 20 lb at this time. His last echo ordered by PCP showed EF 55-60% and mildly reduced RVSF. He was admitted 6/4 as a planned admission for RHC and coronary angiogram for bridging with heparin beforehand given BMI too high for Lovenox and recent TIA with high [id number] - though heparin was not ordered in the hospital. He was noted to be volume overloaded on day of admission so was diuresed about 20 lb before RHC. RHC showed RA 10 and PCWP 10 with normal cardiac outputs. He was changed to torsemide 50 mg bid, lisinopril was decreased (per patient to daily), and he was not bridged back to therapeutic INR. His DC weight was 370 lb.     Since hospital DC, patient feels \"great\" - even better than day of DC. He does not monitor weight as he does not have scale. LE edema has improved. He sleeps on incline without orthopnea or PND, wears CPAP at night. He denies presyncope, syncope, palpitations. Denies chest pain with current level of exertion. BP at home 110s mostly/70s. No recent stroke-like sx, no bleeding issues on warfarin.     PAST MEDICAL HISTORY:  Past Medical History:   Diagnosis Date     Atrial fibrillation (H)      Basal cell carcinoma      Chronic anticoagulation      Diastolic heart failure (H)      Dyslipidemia      Essential hypertension      Morbid obesity (H)      Sleep-disordered breathing      Type 2 diabetes mellitus (H)        FAMILY HISTORY:  Family History   Problem Relation " Age of Onset     Depression Mother      Glaucoma Maternal Grandfather      CANCER No family hx of      No family history of skin cancer     Macular Degeneration No family hx of        SOCIAL HISTORY:  Social History     Social History     Marital status: Single     Spouse name: N/A     Number of children: N/A     Years of education: N/A     Occupational History     Not on file.     Social History Main Topics     Smoking status: Never Smoker     Smokeless tobacco: Never Used     Alcohol use No      Comment: Former alcohol abuse. Quit 70s then quit later in 80s-present     Drug use: No      Comment: history of LSD use     Sexual activity: Not on file     Other Topics Concern     Not on file     Social History Narrative    Lives in an apartment in 16th floor near hospital.  Has elevators, unable to use stairs. Not able to shop; has things delivered to him including food. Difficulty completing cleaning. Goes to PCP once yearly for annual check up and medication review.       CURRENT MEDICATIONS:    Current Outpatient Prescriptions on File Prior to Visit:  Ascorbic Acid (VITAMIN C PO)    aspirin 81 MG tablet Take 1 tablet (81 mg) by mouth daily   atorvastatin (LIPITOR) 40 MG tablet Take 1 tablet (40 mg) by mouth daily   blood glucose monitoring (ONE TOUCH DELICA) lancets Use to test blood sugars 2 times daily or as directed.   blood glucose monitoring (ONE TOUCH ULTRA MINI) meter device kit Use to test blood sugars 2 times daily or as directed.   blood glucose monitoring (ONETOUCH ULTRA) test strip Use to test blood sugars 2 times daily or as directed.   levothyroxine (SYNTHROID/LEVOTHROID) 175 MCG tablet Take 1 tablet (175 mcg) by mouth daily   lisinopril (PRINIVIL/ZESTRIL) 5 MG tablet Take 1 tablet (5 mg) by mouth 2 times daily   metFORMIN (GLUCOPHAGE) 500 MG tablet Take 1 tablet (500 mg) by mouth 2 times daily (with meals)   metoprolol (TOPROL-XL) 100 MG 24 hr tablet Take 1 tablet (100 mg) by mouth daily    multivitamin, therapeutic with minerals (MULTI-VITAMIN) TABS tablet Take 1 tablet by mouth daily   nystatin (MYCOSTATIN) cream    omega 3 1000 MG CAPS Take 1 g by mouth daily   order for DME Equipment being ordered: Bariatric Lift chairDiagnosis - morbid obesity, CHF, LymphedemaFax to Ne from [employer] at [phone].   order for DME Equipment being ordered: Other: Velcro Compression stockings. Treatment Diagnosis: bilateral lymphedema.   polyethylene glycol (MIRALAX/GLYCOLAX) Packet Take 17 g by mouth daily as needed for constipation   potassium chloride SA (K-DUR/KLOR-CON M) 20 MEQ CR tablet Take 40 meq in the morning and 20 meq in the afternoons.   senna-docusate (SENOKOT-S;PERICOLACE) 8.6-50 MG per tablet Take 1-2 tablets by mouth 2 times daily as needed for constipation   spironolactone (ALDACTONE) 25 MG tablet Take 1 tablet (25 mg) by mouth daily   tamsulosin (FLOMAX) 0.4 MG capsule Take 1 capsule (0.4 mg) by mouth daily   torsemide (DEMADEX) 100 MG tablet Take 0.5 tablets (50 mg) by mouth 2 times daily   vitamin B complex with vitamin C (VITAMIN  B COMPLEX) TABS tablet Take 1 tablet by mouth daily   VITAMIN E COMPLEX PO    warfarin (COUMADIN) 5 MG tablet Take 2.5 tablets (12.5 mg) by mouth daily   cephALEXin (KEFLEX) 500 MG capsule      No current facility-administered medications on file prior to visit.     ROS:   CONSTITUTIONAL: Denies fever, chills, fatigue, or weight fluctuations.    HEENT: Denies headache, vision changes, and changes in speech.   CV: Refer to HPI.   PULMONARY:Refer to HPI.   GI:Denies nausea, vomiting, diarrhea, and abdominal pain. Bowel movements are regular.   :Denies urinary alterations, dysuria, urinary frequency, hematuria, and abnormal drainage.   EXT:+chronic lower extremity edema.   SKIN:Denies abnormal rashes or lesions.   MUSCULOSKELETAL:Denies upper or lower extremity weakness and pain.   NEUROLOGIC:Denies lightheadedness, dizziness, seizures, or upper or lower  extremity paresthesia.     EXAM:  BP 97/59 (BP Location: Left arm, Patient Position: Chair, Cuff Size: Adult Large)  Pulse 86  Ht 1.829 m (6')  Wt (!) 171.5 kg (378 lb 1.6 oz)  SpO2 96%  BMI 51.28 kg/m2     GENERAL: Appears comfortable, in no acute distress.   HEENT: Eye symmetrical, no discharge or icterus bilaterally. Mucous membranes moist and without lesions.  CV: RRR, distant S1S2, no appreciable murmur, rub, or gallop. JVP not appreciable at 90 degrees (examined in WC).   RESPIRATORY: Respirations regular, even, and unlabored. Lungs CTA throughout.   GI: Soft, obese, and non distended with normoactive bowel sounds present in all quadrants. No tenderness, rebound, guarding. No hepatomegaly.   EXTREMITIES: Trace bilat peripheral edema. 2+ bilateral pedal pulses.   NEUROLOGIC: Alert and oriented x 3. No focal deficits.   MUSCULOSKELETAL: No joint swelling or tenderness.   SKIN: No jaundice. No rashes.     Labs, reviewed with patient in clinic today:  CBC RESULTS:  Lab Results   Component Value Date    WBC 9.3 06/04/2018    RBC 4.37 (L) 06/04/2018    HGB 13.5 06/04/2018    HCT 40.8 06/04/2018    MCV 93 06/04/2018    MCH 30.9 06/04/2018    MCHC 33.1 06/04/2018    RDW 13.7 06/04/2018     06/04/2018       CMP RESULTS:  Lab Results   Component Value Date     06/15/2018    POTASSIUM 4.3 06/15/2018    CHLORIDE 102 06/15/2018    CO2 27 06/15/2018    ANIONGAP 10 06/15/2018     (H) 06/15/2018    BUN 19 06/15/2018    CR 0.98 06/15/2018    GFRESTIMATED 76 06/15/2018    GFRESTBLACK >90 06/15/2018    CHERI 9.2 06/15/2018    BILITOTAL 1.1 06/04/2018    ALBUMIN 3.3 (L) 06/04/2018    ALKPHOS 79 06/04/2018    ALT 22 06/04/2018    AST 16 06/04/2018        INR RESULTS:  Lab Results   Component Value Date    INR 1.50 (H) 06/08/2018       Lab Results   Component Value Date    MAG 2.3 06/08/2018     Lab Results   Component Value Date    NTBNPI 1621 (H) 06/04/2018     Lab Results   Component Value Date    NTBNP  1443 (H) 05/21/2018       Diagnostics:  ECG 1/6/18  Atrial fibrillation with PVCs, multifocal    TTE 5/18/18  Technically difficult study. The patient's rhythm is atrial fibrillation.  Global and regional left ventricular function is normal with an EF of 55-60%.  Global right ventricular function is mildly reduced.  No significant valvular abnormalities were noted.  Dilation of the inferior vena cava is present with normal respiratory  variation in diameter.Estimated mean right atrial pressure is 8 mmHg.  No pericardial effusion is present    RHC 6/6/18        Assessment and Plan:   Mr. Moreira is a 68 year old male with prior history of systolic heart failure, most recent LVEF normal, also hx of morbid obesity, atrial fibrillation, THONG/alveolar hypoventilation, HTN who presents to CORE clinic for follow-up after recent RHC and coronary angioram. Today he appears fairly euvolemic and subjectively he is feeling great. Will set up with [employer] to allow remote monitoring and diuretic adjustments. Increase spironolactone today, repeat BMP next week.     # Chronic borderline diastolic heart failure/HFpEF with improved EF  Multifactorial, some component of ICM but also likely diastolic dysfunction 2/2 obesity, THONG, etc. Stage C. NYHA Class III.    Fluid status: euvolemic, continue torsemide 50 mg bid with extra dose PRN for weight gain or swelling  ACEi/ARB/ARNI: lisinopril 5 mg daily at HS   BB: Toprol 100 mg daily for HTN and rate control  Aldosterone antagonist: increase spironolactone 50 mg daily (despite normal EF now, some benefit shown in TOPCAT trial)  SCD prophylaxis: n/a EF>40%  Ischemic evaluation: negative coronary angiogram 6/2018  Remote monitoring: will set patient up w/ Cardiocom     # HTN  -continue lisinpril and toprol    # Atrial fibrillation ?permanant, hx of ablation per patient   [id number] 4 (?6 if recent neuro episode considered TIA)  -continue warfarin  -continue toprol   -may need EP f/u    #  THONG/alveolar hypoventilation  -encouraged consistent CPAP use       Follow up in 1 month in CORE clinic with myself      25 minutes spent face-to-face with patient, >50% in counseling and/or coordination of care as described above        Alisha Norman DNP, NP-C  6/15/2018          GEOFFREY BE

## 2018-06-15 NOTE — TELEPHONE ENCOUNTER
Returned call to home care OT to give verbal orders per protocol as requested. OT verbalized understanding.    Carla Gonzales RN

## 2018-06-15 NOTE — PATIENT INSTRUCTIONS
"You were seen today in the Cardiovascular Clinic at the [facility].     Cardiology Providers you saw during your visit: Alisha KIRK, CNP       1. Increase spironolactone to 25 mg twice a day  2. Decrease potassium to 20 MEQ twice a day  3. We will set you up with cardiocom scale.   4. Follow up in 1 month with Alisha Norman and labs prior.   5. Labs to be drawn next week at [facility].   6. Once you start weighing yourself with cardiocom, if you gain 3 lbs in one night or 5 lbs in 1 week, take an additional dose of torsemide (half of tablet to equal 50 mg)and call us to let us know.     Please limit your fluid intake to 2 L (64 ounces) daily.  2 Liters a day = 8.5 cups, or 72 ounces.  Please limit your salt intake to 2 grams a day or less.    If you gain 2# in 24 hours or 5# in one week call Amira Doll RN so we can adjust your medications as needed over the phone.    Please feel free to call me with any questions or concerns.      Amira Doll RN BSN   [facility]  Cardiology Care Coordinator-Heart Failure Clinic    Questions and [phone].   First press #1 for the University and then press #3 for \"Medical Questions\" to reach us Cardiology Nurses.     On Call Cardiologist for after hours or on weekends: [phone]   option #4 and ask to speak to the on-call Cardiologist. Inform them you are a CORE/heart failure patient at the [facility].        If you need a medication refill please contact your pharmacy.  Please allow 3 business days for your refill to be completed.  _______________________________________________________  C.O.R.E. CLINIC Cardiomyopathy, Optimization, Rehabilitation, Education   The C.O.R.E. CLINIC is a heart failure specialty clinic within the [facility] where you will work with specialized nurse practitioners dedicated to helping patients with heart failure carefully adjust medications, receive " education, and learn who and when to call if symptoms develop. They specialize in helping you better understand your condition, slow the progression of your disease, improve the length and quality of your life, help you detect future heart problems before they become life threatening, and avoid hospitalizations.  As always, thank you for trusting us with your health care needs!

## 2018-06-18 ENCOUNTER — OFFICE VISIT (OUTPATIENT)
Dept: PHARMACY | Facility: CLINIC | Age: 68
End: 2018-06-18
Payer: COMMERCIAL

## 2018-06-18 ENCOUNTER — OFFICE VISIT (OUTPATIENT)
Dept: FAMILY MEDICINE | Facility: CLINIC | Age: 68
End: 2018-06-18
Payer: COMMERCIAL

## 2018-06-18 ENCOUNTER — TELEPHONE (OUTPATIENT)
Dept: FAMILY MEDICINE | Facility: CLINIC | Age: 68
End: 2018-06-18

## 2018-06-18 VITALS
BODY MASS INDEX: 50.94 KG/M2 | DIASTOLIC BLOOD PRESSURE: 72 MMHG | HEART RATE: 93 BPM | OXYGEN SATURATION: 97 % | TEMPERATURE: 98.6 F | SYSTOLIC BLOOD PRESSURE: 125 MMHG | RESPIRATION RATE: 16 BRPM | WEIGHT: 315 LBS

## 2018-06-18 DIAGNOSIS — E87.6 HYPOKALEMIA: ICD-10-CM

## 2018-06-18 DIAGNOSIS — I48.20 CHRONIC ATRIAL FIBRILLATION (H): ICD-10-CM

## 2018-06-18 DIAGNOSIS — I89.0 LYMPHEDEMA: ICD-10-CM

## 2018-06-18 DIAGNOSIS — I50.30 (HFPEF) HEART FAILURE WITH PRESERVED EJECTION FRACTION (H): ICD-10-CM

## 2018-06-18 DIAGNOSIS — I50.22 CHRONIC SYSTOLIC CONGESTIVE HEART FAILURE (H): ICD-10-CM

## 2018-06-18 DIAGNOSIS — I50.30 (HFPEF) HEART FAILURE WITH PRESERVED EJECTION FRACTION (H): Primary | ICD-10-CM

## 2018-06-18 DIAGNOSIS — I10 ESSENTIAL HYPERTENSION WITH GOAL BLOOD PRESSURE LESS THAN 140/90: ICD-10-CM

## 2018-06-18 LAB
% GRANULOCYTES: 61.9 %G (ref 40–75)
BUN SERPL-MCNC: 22.6 MG/DL (ref 7–21)
CALCIUM SERPL-MCNC: 9.8 MG/DL (ref 8.5–10.1)
CHLORIDE SERPLBLD-SCNC: 101.3 MMOL/L (ref 98–110)
CO2 SERPL-SCNC: 28.8 MMOL/L (ref 20–32)
CREAT SERPL-MCNC: 1 MG/DL (ref 0.7–1.3)
GFR SERPL CREATININE-BSD FRML MDRD: 79 ML/MIN/1.7 M2
GLUCOSE SERPL-MCNC: 113.6 MG'DL (ref 70–99)
GRANULOCYTES #: 5 K/UL (ref 1.6–8.3)
HCT VFR BLD AUTO: 50.5 % (ref 40–53)
HEMOGLOBIN: 15.5 G/DL (ref 13.3–17.7)
INR PPP: 2.8
LYMPHOCYTES # BLD AUTO: 2.4 K/UL (ref 0.8–5.3)
LYMPHOCYTES NFR BLD AUTO: 30.1 %L (ref 20–48)
MCH RBC QN AUTO: 30 PG (ref 26.5–35)
MCHC RBC AUTO-ENTMCNC: 30.7 G/DL (ref 32–36)
MCV RBC AUTO: 97.9 FL (ref 78–100)
MID #: 0.6 K/UL (ref 0–2.2)
MID %: 8 %M (ref 0–20)
NT-PROBNP SERPL-MCNC: 1206 PG/ML (ref 0–125)
PLATELET # BLD AUTO: 233 K/UL (ref 150–450)
POTASSIUM SERPL-SCNC: 3.9 MMOL/DL (ref 3.3–4.5)
RBC # BLD AUTO: 5.16 M/UL (ref 4.4–5.9)
SODIUM SERPL-SCNC: 135.1 MMOL/L (ref 132.6–141.4)
WBC # BLD AUTO: 8.1 K/UL (ref 4–11)

## 2018-06-18 RX ORDER — TORSEMIDE 100 MG/1
50 TABLET ORAL
Qty: 60 TABLET | Refills: 3 | Status: SHIPPED | OUTPATIENT
Start: 2018-06-18 | End: 2018-08-28

## 2018-06-18 RX ORDER — SPIRONOLACTONE 25 MG/1
25 TABLET ORAL 2 TIMES DAILY
Qty: 120 TABLET | Refills: 3 | Status: SHIPPED | OUTPATIENT
Start: 2018-06-18 | End: 2018-08-28

## 2018-06-18 RX ORDER — SPIRONOLACTONE 25 MG/1
25 TABLET ORAL 2 TIMES DAILY
Qty: 60 TABLET | Refills: 3 | Status: SHIPPED | OUTPATIENT
Start: 2018-06-18 | End: 2018-06-18

## 2018-06-18 RX ORDER — POTASSIUM CHLORIDE 1500 MG/1
20 TABLET, EXTENDED RELEASE ORAL 3 TIMES DAILY
Qty: 90 TABLET | Refills: 11 | Status: SHIPPED | OUTPATIENT
Start: 2018-06-18 | End: 2018-07-18

## 2018-06-18 RX ORDER — SPIRONOLACTONE 25 MG/1
25 TABLET ORAL 2 TIMES DAILY
Qty: 30 TABLET | Refills: 3
Start: 2018-06-18 | End: 2018-06-18

## 2018-06-18 NOTE — TELEPHONE ENCOUNTER
Called the pharmacist back and refilled the rx as previously filled.  K+ 20meq two tabs am and one in the afternoon    Tj Palumbo Pharm.D.

## 2018-06-18 NOTE — MR AVS SNAPSHOT
After Visit Summary   6/18/2018    Clarke Moreira    MRN: [id number]           Patient Information     Date Of Birth          1950        Visit Information        Provider Department      6/18/2018 11:00 AM [facility]        Today's Diagnoses     Chronic systolic congestive heart failure (H)        (HFpEF) heart failure with preserved ejection fraction (H)           Follow-ups after your visit        Your next 10 appointments already scheduled     Jul 18, 2018  1:00 PM CDT   Lab with  LAB    [facility] ([facility])    [address]  1st Floor  [facility] [id number] [phone]            Jul 18, 2018  1:30 PM CDT   (Arrive by 1:15 PM)   CORE RETURN with REAGAN [facility] ([facility])    [address]  [address]  [facility] [id number] [phone]            Jul 23, 2018 11:20 AM CDT   Return Visit with [facility] ([facility])    [address],  [address]  [facility] [address]   [phone]            Sep 07, 2018 11:20 AM CDT   (Arrive by 11:05 AM)   [facility]    [address] [place] [address]  4th Floor  [facility] [id number] [phone]              Who to contact     Please call your clinic at [phone] to:    Ask questions about your health    Make or cancel appointments    Discuss your medicines    Learn about your test results    Speak to your doctor            Additional Information About Your Visit        MyChart Information     Prompt.ly is an electronic gateway that provides easy, online access to your medical records. With Prompt.ly, you can request a clinic appointment, read your test results, renew a prescription or communicate with your care team.     To sign up  for MyChart visit the website at [email or web address]   You will be asked to enter the access code listed below, as well as some personal information. Please follow the directions to create your username and password.     Your access code is: [email or web address]  Expires: 2018  6:30 AM     Your access code will  in 90 days. If you need help or a new code, please contact your [facility] or call [phone] for assistance.        Care EveryWhere ID     This is your Care EveryWhere ID. This could be used by other organizations to access your [facility] medical records  [id number]         Blood Pressure from Last 3 Encounters:   18 125/72   06/15/18 97/59   18 119/83    Weight from Last 3 Encounters:   18 (!) 375 lb 9.6 oz (170.4 kg)   06/15/18 (!) 378 lb 1.6 oz (171.5 kg)   18 (!) 369 lb 14.9 oz (167.8 kg)              Today, you had the following     No orders found for display         Today's Medication Changes          These changes are accurate as of 18 11:59 PM.  If you have any questions, ask your nurse or doctor.               Start taking these medicines.        Dose/Directions    spironolactone 25 MG tablet   Commonly known as:  ALDACTONE   Used for:  Chronic systolic congestive heart failure (H)   Started by:  Marta Heath RN        Dose:  25 mg   Take 1 tablet (25 mg) by mouth 2 times daily   Quantity:  120 tablet   Refills:  3         These medicines have changed or have updated prescriptions.        Dose/Directions    potassium chloride SA 20 MEQ CR tablet   Commonly known as:  K-DUR/KLOR-CON M   Indication:  hypokalemia   This may have changed:    - how much to take  - how to take this  - when to take this  - additional instructions   Used for:  Hypokalemia   Changed by:  Matthias Sanchez MD        Dose:  20 mEq   Take 1 tablet (20 mEq) by mouth 3 times daily   Quantity:  90 tablet   Refills:  11         Stop taking these  medicines if you haven't already. Please contact your care team if you have questions.     cephALEXin 500 MG capsule   Commonly known as:  KEFLEX   Stopped by:  Tj Palumbo                Where to get your medicines      These medications were sent to [id number] - [place], [place] - [address]  [address] [facility] [address]     Phone:  [phone]     potassium chloride SA 20 MEQ CR tablet    spironolactone 25 MG tablet    torsemide 100 MG tablet                Primary Care Provider Office Phone # Fax #    Matthias Sanchez [phone]       [address]  [facility] [address]        Equal Access to Services     [place]ER [place] : Hadii kell Cotton, wazenaidada matt, qaybta kaalmada veronika, vasyl casarez . So [facility] [phone].    ATENCIÓN: Si habla español, tiene a kim disposición servicios gratuitos de asistencia lingüística. Llame al [phone].    We comply with applicable federal civil rights laws and [place] laws. We do not discriminate on the basis of race, color, national origin, age, disability, sex, sexual orientation, or gender identity.            Thank you!     Thank you for choosing [facility]  for your care. Our goal is always to provide you with excellent care. Hearing back from our patients is one way we can continue to improve our services. Please take a few minutes to complete the written survey that you may receive in the mail after your visit with us. Thank you!             Your Updated Medication List - Protect others around you: Learn how to safely use, store and throw away your medicines at www.disposemymeds.org.          This list is accurate as of 6/18/18 11:59 PM.  Always use your most recent med list.                   Brand Name Dispense Instructions for use Diagnosis    aspirin 81 MG tablet     30 tablet    Take 1 tablet (81 mg) by mouth daily    Essential  hypertension       atorvastatin 40 MG tablet    LIPITOR    30 tablet    Take 1 tablet (40 mg) by mouth daily    Type 2 diabetes mellitus without complication, without long-term current use of insulin (H)       blood glucose monitoring lancets     100 each    Use to test blood sugars 2 times daily or as directed.    Diabetes mellitus, type 2 (H)       blood glucose monitoring meter device kit     1 kit    Use to test blood sugars 2 times daily or as directed.    Type 2 diabetes mellitus with hyperglycemia, without long-term current use of insulin (H)       blood glucose monitoring test strip    ONETOUCH ULTRA    100 strip    Use to test blood sugars 2 times daily or as directed.    Diabetes mellitus, type 2 (H)       levothyroxine 175 MCG tablet    SYNTHROID/LEVOTHROID    30 tablet    Take 1 tablet (175 mcg) by mouth daily    Hypothyroidism, unspecified type       lisinopril 5 MG tablet    PRINIVIL/ZESTRIL    60 tablet    Take 1 tablet (5 mg) by mouth daily Take in Pm    Chronic systolic congestive heart failure (H)       metFORMIN 500 MG tablet    GLUCOPHAGE    60 tablet    Take 1 tablet (500 mg) by mouth 2 times daily (with meals)    Type 2 diabetes mellitus without complication, without long-term current use of insulin (H)       metoprolol succinate 100 MG 24 hr tablet    TOPROL-XL    30 tablet    Take 1 tablet (100 mg) by mouth daily    Essential hypertension with goal blood pressure less than 140/90       multivitamin, therapeutic with minerals Tabs tablet     30 each    Take 1 tablet by mouth daily    Type 2 diabetes mellitus without complication, without long-term current use of insulin (H)       nystatin cream    MYCOSTATIN          omega 3 1000 MG Caps     30 capsule    Take 1 g by mouth daily    Hyperlipidemia LDL goal <100       * order for DME     2 each    Equipment being ordered: Other: Velcro Compression stockings.  Treatment Diagnosis: bilateral lymphedema.    Lymphedema of extremity       * order for  DME     1 Units    Equipment being ordered: Bariatric Lift chair Diagnosis - morbid obesity, CHF, Lymphedema  Fax to Ne from [employer] at [phone].    Chronic systolic congestive heart failure (H), Lymphedema, Morbid obesity (H)       polyethylene glycol Packet    MIRALAX/GLYCOLAX    15 packet    Take 17 g by mouth daily as needed for constipation    Constipation, unspecified constipation type       potassium chloride SA 20 MEQ CR tablet    K-DUR/KLOR-CON M    90 tablet    Take 1 tablet (20 mEq) by mouth 3 times daily    Hypokalemia       senna-docusate 8.6-50 MG per tablet    SENOKOT-S;PERICOLACE    60 tablet    Take 1-2 tablets by mouth 2 times daily as needed for constipation    Constipation, unspecified constipation type       spironolactone 25 MG tablet    ALDACTONE    120 tablet    Take 1 tablet (25 mg) by mouth 2 times daily    Chronic systolic congestive heart failure (H)       tamsulosin 0.4 MG capsule    FLOMAX    30 capsule    Take 1 capsule (0.4 mg) by mouth daily    Urinary retention       torsemide 100 MG tablet    DEMADEX    60 tablet    Take 0.5 tablets (50 mg) by mouth 2 times daily    (HFpEF) heart failure with preserved ejection fraction (H)       vitamin B complex with vitamin C Tabs tablet      Take 1 tablet by mouth daily        VITAMIN C PO           VITAMIN E COMPLEX PO           warfarin 5 MG tablet    COUMADIN    75 tablet    Take 2.5 tablets (12.5 mg) by mouth daily    Persistent atrial fibrillation (H)       * Notice:  This list has 2 medication(s) that are the same as other medications prescribed for you. Read the directions carefully, and ask your doctor or other care provider to review them with you.

## 2018-06-18 NOTE — PATIENT INSTRUCTIONS
Here is the plan from today's visit      1. Essential hypertension with goal blood pressure less than 140/90    - Basic Metabolic Panel ([facility])    2. Chronic atrial fibrillation (H)  Follow up with Home care Nurse INR in 10 Days  - INR ([facility])    3. Chronic systolic congestive heart failure (H)  Continue Potassium 3 times daily     Increase spironolactone (ALDACTONE) 25 MG tablet; Take 1 tablet (25 mg) by mouth 2 times daily  Dispense: 30 tablet; Refill: 3    4. Hypokalemia    - potassium chloride SA (K-DUR/KLOR-CON M) 20 MEQ CR tablet; Take 1 tablet (20 mEq) by mouth 3 times daily  Dispense: 90 tablet; Refill: 11      Please call or return to clinic if your symptoms don't go away.    Follow up plan  Please make a clinic appointment for follow up with me (RYNE CHAN) in 2  month for Follow up .    Thank you for coming to [facility] today.  Lab Testing:  **If you had lab testing today and your results are reassuring or normal they will be mailed to you or sent through [employer] within 7 days.   **If the lab tests need quick action we will call you with the results.  The phone number we will call with results is # [phone] (home) . If this is not the best number please call our clinic and change the number.  Medication Refills:  If you need any refills please call your pharmacy and they will contact us.   If you need to  your refill at a new pharmacy, please contact the new pharmacy directly. The new pharmacy will help you get your medications transferred faster.   Scheduling:  If you have any concerns about today's visit or wish to schedule another appointment please call our office during normal business hours [phone] (8-5:00 M-F)  If a referral was made to a [facility] and you don't get a call from central scheduling please call [phone].  If a Mammogram was ordered for you at [facility] call [phone] to schedule or change your appointment.  If  you had an XRay/CT/Ultrasound/MRI ordered the number is [phone] to schedule or change your radiology appointment.   Medical Concerns:  If you have urgent medical concerns please call [phone] at any time of the day.    Matthias Sanchez MD

## 2018-06-18 NOTE — LETTER
June 19, 2018      Clarke Moreira  [address]  [facility] [address]        Dear Clarke,    Thank you for getting your care at [facility]. Please see below for your test results.  Kidney and electrolytes are good  The BNP (heart function) is improved with your lower weight.  Your blood counts are good after the procedure.  Your INR is already in a good place.    Resulted Orders   Basic Metabolic Panel ([facility])   Result Value Ref Range    Urea Nitrogen 22.6 (H) 7.0 - 21.0 mg/dL    Calcium 9.8 8.5 - 10.1 mg/dL    Chloride 101.3 98.0 - 110.0 mmol/L    Carbon Dioxide 28.8 20.0 - 32.0 mmol/L    Creatinine 1.0 0.7 - 1.3 mg/dL    Glucose 113.6 (H) 70.0 - 99.0 mg'dL    Potassium 3.9 3.3 - 4.5 mmol/dL    Sodium 135.1 132.6 - 141.4 mmol/L    GFR Estimate 79.0 >60.0 mL/min/1.7 m2    GFR Estimate If Black >90 >60.0 mL/min/1.7 m2   N terminal pro BNP outpatient   Result Value Ref Range    N-Terminal Pro Bnp 1206 (H) 0 - 125 pg/mL      Comment:         Reference range shown and results flagged as abnormal are for the outpatient,   non acute settings. Establishing a baseline value for each individual patient   is useful for follow-up.  Suggested inpatient cut points for confirming diagnosis of CHF in an acute   setting are:   >450 pg/mL (age 18 to less than 50)   >900 pg/mL (age 50 to less than 75)   >1800 pg/mL (75 yrs and older)  An inpatient or emergency department NT-proPBNP <300 pg/mL effectively rules   out acute CHF, with 99% negative predictive value.      CBC with Diff Plt ([facility])   Result Value Ref Range    WBC 8.1 4.0 - 11.0 K/uL    Lymphocytes # 2.4 0.8 - 5.3 K/uL    % Lymphocytes 30.1 20.0 - 48.0 %L    Mid # 0.6 0.0 - 2.2 K/uL    Mid % 8.0 0.0 - 20.0 %M    GRANULOCYTES # 5.0 1.6 - 8.3 K/uL    % Granulocytes 61.9 40.0 - 75.0 %G    RBC 5.16 4.40 - 5.90 M/uL    Hemoglobin 15.5 13.3 - 17.7 g/dL    Hematocrit 50.5 40.0 - 53.0 %    MCV 97.9 78.0 - 100.0 fL    MCH 30.0 26.5 - 35.0 pg    MCHC  30.7 (L) 32.0 - 36.0 g/dL    Platelets 233.0 150.0 - 450.0 K/uL   INR ([facility])   Result Value Ref Range    INR 2.8       Comment:      Adult Normal Range: 0.90 - 1.10  Therapeutic Range: 2.0 - 3.5             Sincerely,    Matthias Sanchez MD

## 2018-06-18 NOTE — MR AVS SNAPSHOT
After Visit Summary   6/18/2018    Clarke Moreira    MRN: [id number]           Patient Information     Date Of Birth          1950        Visit Information        Provider Department      6/18/2018 11:20 AM Matthias Chan MD [facility]        Today's Diagnoses     Cellulitis    -  1    Essential hypertension with goal blood pressure less than 140/90        Chronic atrial fibrillation (H)        Chronic systolic congestive heart failure (H)        Hypokalemia          Care Instructions    Here is the plan from today's visit      1. Essential hypertension with goal blood pressure less than 140/90    - Basic Metabolic Panel ([facility])    2. Chronic atrial fibrillation (H)  Follow up with Home care Nurse INR in 10 Days  - INR ([facility])    3. Chronic systolic congestive heart failure (H)  Continue Potassium 3 times daily     Increase spironolactone (ALDACTONE) 25 MG tablet; Take 1 tablet (25 mg) by mouth 2 times daily  Dispense: 30 tablet; Refill: 3    4. Hypokalemia    - potassium chloride SA (K-DUR/KLOR-CON M) 20 MEQ CR tablet; Take 1 tablet (20 mEq) by mouth 3 times daily  Dispense: 90 tablet; Refill: 11      Please call or return to clinic if your symptoms don't go away.    Follow up plan  Please make a clinic appointment for follow up with me (MATTHIAS CHAN) in 2  month for Follow up .    Thank you for coming to [facility] today.  Lab Testing:  **If you had lab testing today and your results are reassuring or normal they will be mailed to you or sent through [employer] within 7 days.   **If the lab tests need quick action we will call you with the results.  The phone number we will call with results is # [phone] (home) . If this is not the best number please call our clinic and change the number.  Medication Refills:  If you need any refills please call your pharmacy and they will contact us.   If you need to  your refill at a new pharmacy, please  contact the new pharmacy directly. The new pharmacy will help you get your medications transferred faster.   Scheduling:  If you have any concerns about today's visit or wish to schedule another appointment please call our office during normal business hours [phone] (8-5:00 M-F)  If a referral was made to a [facility] and you don't get a call from central scheduling please call [phone].  If a Mammogram was ordered for you at [facility] call [phone] to schedule or change your appointment.  If you had an XRay/CT/Ultrasound/MRI ordered the number is [phone] to schedule or change your radiology appointment.   Medical Concerns:  If you have urgent medical concerns please call [phone] at any time of the day.    Matthias Sanchez MD            Follow-ups after your visit        Your next 10 appointments already scheduled     Jun 21, 2018 10:20 AM CDT   Return Visit with [facility] ([facility])    [address] [place] [address],  [address]  [facility] [address]   [phone]            Jul 06, 2018 10:40 AM CDT   (Arrive by 10:25 AM)   RETURN FOOT/ANKLE with KIERRA Capone [facility] ([facility])    [address] [place] [address]  4th Floor  [facility] [address] [phone]            Jul 18, 2018  1:00 PM CDT   Lab with [facility]    Health Lab ([facility])    [address] [place] [address]  1st Floor  [facility] [address] [phone]            Jul 18, 2018  1:30 PM CDT   (Arrive by 1:15 PM)   CORE RETURN with REAGAN Okeefe CNP   [facility] ([facility])    [address] [place] [address]  [address]  [facility] [address] [phone]              Who to contact     Please call your clinic at [phone] to:    Ask questions about your health    Make or cancel appointments    Discuss your medicines    Learn  about your test results    Speak to your doctor            Additional Information About Your Visit        [employer] Information     [employer] is an electronic gateway that provides easy, online access to your medical records. With [employer], you can request a clinic appointment, read your test results, renew a prescription or communicate with your care team.     To sign up for [employer] visit the website at [email or web address]   You will be asked to enter the access code listed below, as well as some personal information. Please follow the directions to create your username and password.     Your access code is: [email or web address]  Expires: 2018  6:30 AM     Your access code will  in 90 days. If you need help or a new code, please contact your [facility] or call [phone] for assistance.        Care EveryWhere ID     This is your Care EveryWhere ID. This could be used by other organizations to access your [facility] medical records  [id number]        Your Vitals Were     Pulse Temperature Respirations Pulse Oximetry BMI (Body Mass Index)       93 98.6  F (37  C) (Oral) 16 97% 50.94 kg/m2        Blood Pressure from Last 3 Encounters:   18 125/72   06/15/18 97/59   18 119/83    Weight from Last 3 Encounters:   18 (!) 375 lb 9.6 oz (170.4 kg)   06/15/18 (!) 378 lb 1.6 oz (171.5 kg)   18 (!) 369 lb 14.9 oz (167.8 kg)              We Performed the Following     Basic Metabolic Panel ([facility])     CBC with Diff Plt ([facility])     INR ([facility])     N terminal pro BNP outpatient          Today's Medication Changes          These changes are accurate as of 18 12:13 PM.  If you have any questions, ask your nurse or doctor.               These medicines have changed or have updated prescriptions.        Dose/Directions    potassium chloride SA 20 MEQ CR tablet   Commonly known as:  K-DUR/KLOR-CON M   Indication:  hypokalemia   This may have changed:    - how  much to take  - how to take this  - when to take this  - additional instructions   Used for:  Hypokalemia   Changed by:  Matthias Sanchez MD        Dose:  20 mEq   Take 1 tablet (20 mEq) by mouth 3 times daily   Quantity:  90 tablet   Refills:  11         Stop taking these medicines if you haven't already. Please contact your care team if you have questions.     cephALEXin 500 MG capsule   Commonly known as:  KEFLEX   Stopped by:  Tj Palumbo                Where to get your medicines      These medications were sent to [id number] - [place], [place] - [address]  [address] [facility] [address]     Phone:  [phone]     potassium chloride SA 20 MEQ CR tablet         Some of these will need a paper prescription and others can be bought over the counter.  Ask your nurse if you have questions.     You don't need a prescription for these medications     spironolactone 25 MG tablet                Primary Care Provider Office Phone # Fax #    Matthias Sanchez [phone]       [address] [address]        Equal Access to Services     [facility]: Hadii kell andujar hadasho Sodillan, waaxda luqadaha, qaybta kaalmada adenegrayanacho, vasyl casarez . So [facility] [phone].    ATENCIÓN: Si habla español, tiene a kim disposición servicios gratuitos de asistencia lingüística. [facility] [phone].    We comply with applicable federal civil rights laws and [place] laws. We do not discriminate on the basis of race, color, national origin, age, disability, sex, sexual orientation, or gender identity.            Thank you!     Thank you for choosing [facility]  for your care. Our goal is always to provide you with excellent care. Hearing back from our patients is one way we can continue to improve our services. Please take a few minutes to complete the written survey that you may receive in the mail after your visit  with us. Thank you!             Your Updated Medication List - Protect others around you: Learn how to safely use, store and throw away your medicines at www.disposemymeds.org.          This list is accurate as of 6/18/18 12:13 PM.  Always use your most recent med list.                   Brand Name Dispense Instructions for use Diagnosis    aspirin 81 MG tablet     30 tablet    Take 1 tablet (81 mg) by mouth daily    Essential hypertension       atorvastatin 40 MG tablet    LIPITOR    30 tablet    Take 1 tablet (40 mg) by mouth daily    Type 2 diabetes mellitus without complication, without long-term current use of insulin (H)       blood glucose monitoring lancets     100 each    Use to test blood sugars 2 times daily or as directed.    Diabetes mellitus, type 2 (H)       blood glucose monitoring meter device kit     1 kit    Use to test blood sugars 2 times daily or as directed.    Type 2 diabetes mellitus with hyperglycemia, without long-term current use of insulin (H)       blood glucose monitoring test strip    ONETOUCH ULTRA    100 strip    Use to test blood sugars 2 times daily or as directed.    Diabetes mellitus, type 2 (H)       levothyroxine 175 MCG tablet    SYNTHROID/LEVOTHROID    30 tablet    Take 1 tablet (175 mcg) by mouth daily    Hypothyroidism, unspecified type       lisinopril 5 MG tablet    PRINIVIL/ZESTRIL    60 tablet    Take 1 tablet (5 mg) by mouth daily Take in Pm    Chronic systolic congestive heart failure (H)       metFORMIN 500 MG tablet    GLUCOPHAGE    60 tablet    Take 1 tablet (500 mg) by mouth 2 times daily (with meals)    Type 2 diabetes mellitus without complication, without long-term current use of insulin (H)       metoprolol succinate 100 MG 24 hr tablet    TOPROL-XL    30 tablet    Take 1 tablet (100 mg) by mouth daily    Essential hypertension with goal blood pressure less than 140/90       multivitamin, therapeutic with minerals Tabs tablet     30 each    Take 1 tablet by  mouth daily    Type 2 diabetes mellitus without complication, without long-term current use of insulin (H)       nystatin cream    MYCOSTATIN          omega 3 1000 MG Caps     30 capsule    Take 1 g by mouth daily    Hyperlipidemia LDL goal <100       * order for DME     2 each    Equipment being ordered: Other: Velcro Compression stockings.  Treatment Diagnosis: bilateral lymphedema.    Lymphedema of extremity       * order for DME     1 Units    Equipment being ordered: Bariatric Lift chair Diagnosis - morbid obesity, CHF, Lymphedema  Fax to Ne from [employer] at [phone].    Chronic systolic congestive heart failure (H), Lymphedema, Morbid obesity (H)       polyethylene glycol Packet    MIRALAX/GLYCOLAX    15 packet    Take 17 g by mouth daily as needed for constipation    Constipation, unspecified constipation type       potassium chloride SA 20 MEQ CR tablet    K-DUR/KLOR-CON M    90 tablet    Take 1 tablet (20 mEq) by mouth 3 times daily    Hypokalemia       senna-docusate 8.6-50 MG per tablet    SENOKOT-S;PERICOLACE    60 tablet    Take 1-2 tablets by mouth 2 times daily as needed for constipation    Constipation, unspecified constipation type       spironolactone 25 MG tablet    ALDACTONE    30 tablet    Take 1 tablet (25 mg) by mouth 2 times daily    Chronic systolic congestive heart failure (H)       tamsulosin 0.4 MG capsule    FLOMAX    30 capsule    Take 1 capsule (0.4 mg) by mouth daily    Urinary retention       torsemide 100 MG tablet    DEMADEX    60 tablet    Take 0.5 tablets (50 mg) by mouth 2 times daily    (HFpEF) heart failure with preserved ejection fraction (H)       vitamin B complex with vitamin C Tabs tablet      Take 1 tablet by mouth daily        VITAMIN C PO           VITAMIN E COMPLEX PO           warfarin 5 MG tablet    COUMADIN    75 tablet    Take 2.5 tablets (12.5 mg) by mouth daily    Persistent atrial fibrillation (H)       * Notice:  This list has 2 medication(s) that are  the same as other medications prescribed for you. Read the directions carefully, and ask your doctor or other care provider to review them with you.

## 2018-06-18 NOTE — TELEPHONE ENCOUNTER
[facility] phone call message- medication clarification/question:    Full Medication Name: potassium chloride   Dose:     Question: The medication was refilled 1 week ago. But the dose change is questionable.(Per Pharmacist)      Pharmacy confirmed as   [id number] - [place], [place] - [address]  [address]  [facility] [address]  Phone: [phone] Fax: [phone]  : Yes    OK to leave a message on voice mail? Yes    Call taken on June 18, 2018 at 1:19 PM by Lindsay Fernandez

## 2018-06-18 NOTE — PROGRESS NOTES
Hospitalization Follow-up Visit         \[facility]\""       Hospital Follow-up Visit:    Hospital:  [facility]   Date of Admission: 6/4/18  Date of Discharge: 6/8/18  Reason(s) for Admission: Right Heart Cath -  lymphodema            Problems taking medications regularly:  None       Post Discharge Medication Reconciliation: discharge medications reconciled and changed, per note/orders (see AVS).       Problems adhering to non-medication therapy:  None       Medications reviewed by: by PharmD    Summary of hospitalization:  [facility] discharge summary reviewed  Diagnostic Tests/Treatments reviewed.  Follow up needed: needs scale at home  Other Healthcare Providers Involved in Patient s Care:         Homecare and Specialist appointment - cardiology  Update since discharge: improved.   Plan of care communicated with patient              Patient reports feeling depressed with the communications between the hospital      Pain of the right shoulder and right foot  - Home OT for his shoulder which seemed to be helping. Had a set back in visits due to being in the hospital.           Review of Systems:   CONSTITUTIONAL: no fatigue, no unexpected change in weight  SKIN: no worrisome rashes, no worrisome moles, no worrisome lesions  EYES: no acute vision problems or changes  ENT: no ear problems, no mouth problems, no throat problems  RESP: no significant cough, no shortness of breath  CV: no chest pain, no palpitations, no new or worsening peripheral edema  GI: no nausea, no vomiting, no constipation, no diarrhea  NEURO: no weakness, no dizziness, no syncope, no headaches  HEME: no easy bruising, no bleeding problems            Physical Exam:     Vitals:    06/18/18 1136   BP: 125/72   Pulse: 93   Resp: 16   Temp: 98.6  F (37  C)   TempSrc: Oral   SpO2: 97%   Weight: (!) 375 lb 9.6 oz (170.4 kg)     Body mass index is 50.94 kg/(m^2).    Gen: no distress  Lungs: clear  Ext: wrapped  MSE: good            Results:     Results from the last 24 hours   Results for orders placed or performed in visit on 06/18/18 (from the past 24 hour(s))   Basic Metabolic Panel ([facility])   Result Value Ref Range    Urea Nitrogen 22.6 (H) 7.0 - 21.0 mg/dL    Calcium 9.8 8.5 - 10.1 mg/dL    Chloride 101.3 98.0 - 110.0 mmol/L    Carbon Dioxide 28.8 20.0 - 32.0 mmol/L    Creatinine 1.0 0.7 - 1.3 mg/dL    Glucose 113.6 (H) 70.0 - 99.0 mg'dL    Potassium 3.9 3.3 - 4.5 mmol/dL    Sodium 135.1 132.6 - 141.4 mmol/L    GFR Estimate 79.0 >60.0 mL/min/1.7 m2    GFR Estimate If Black >90 >60.0 mL/min/1.7 m2   CBC with Diff Plt ([facility])   Result Value Ref Range    WBC 8.1 4.0 - 11.0 K/uL    Lymphocytes # 2.4 0.8 - 5.3 K/uL    % Lymphocytes 30.1 20.0 - 48.0 %L    Mid # 0.6 0.0 - 2.2 K/uL    Mid % 8.0 0.0 - 20.0 %M    GRANULOCYTES # 5.0 1.6 - 8.3 K/uL    % Granulocytes 61.9 40.0 - 75.0 %G    RBC 5.16 4.40 - 5.90 M/uL    Hemoglobin 15.5 13.3 - 17.7 g/dL    Hematocrit 50.5 40.0 - 53.0 %    MCV 97.9 78.0 - 100.0 fL    MCH 30.0 26.5 - 35.0 pg    MCHC 30.7 (L) 32.0 - 36.0 g/dL    Platelets 233.0 150.0 - 450.0 K/uL   INR ([facility])   Result Value Ref Range    INR 2.8        Assessment and Plan     1. (HFpEF) heart failure with preserved ejection fraction (H)  Hospitalized for Right Heart Cath  Diuresed 20#  Heart pressures good.   Continue with new regiment - torsemide 50 BID, spironolactone 25 BID, lisinopirl 5mg QD, Toprol XL 100mg QD, Potassium TID (K=3.9; did not lower to BID)    - Basic Metabolic Panel ([facility])  - N terminal pro BNP outpatient  - CBC with Diff Plt ([facility])    2. Lymphedema  Improved  Continue wraps    3. Chronic atrial fibrillation (H)  Was not bridged during procedure.  Restarted coumadin.   INR 2.8 today  Repeat in 10days with home nurse    - INR ([facility])    4. Essential hypertension with goal blood pressure less than 140/90  Stable  Monitor as we go up on spironolactone  If visual episodes return, reduce  spironolactone    5. Hypokalemia  Potassium TID  - potassium chloride SA (K-DUR/KLOR-CON M) 20 MEQ CR tablet; Take 1 tablet (20 mEq) by mouth 3 times daily  Dispense: 90 tablet; Refill: 11    6. RTC - [facility] July 18  7. RTC with Laura in August        E&M code to be billed if TCM cannot be: 60960  Type of decision making: Moderate complexity (41124)      Options for treatment and follow-up care were reviewed with the patient  Clarke Moreira   engaged in the decision making process and verbalized understanding of the options discussed and agreed with the final plan.      Matthias Sanchez MD

## 2018-06-18 NOTE — PROGRESS NOTES
Pharmacy Progress Note: Transitions of Care     History of Hospitalization     Clarke Moreira was referred by Dr. Sanchez for transitional care and medication management following their recent hospitalization.       HISTORY OF Hospitalization    Reason for hospitalization: Elective right and left heart cath   Date of ADMIT: 6/4/18   Date of DISCHARGE 6/8/18   Other hospitalizations in past year:      Hospital problem list/ specific issues to address:  1.     Acute decompensated HFpEF  2.     A.fib     Medication CHANGES made during hospitalization (from Discharge Summary)     Discontinued: Added (initiated): Changed (dose/frequency):   Medication(s)    Furosemide   Torsemide 50 mg BID   Spironolactone 25 mg to 50 mg            SUBJECTIVE     Patient is in clinic today for hospital follow up with Dr. Sanchez..     Some changes in drug therapy while hospitalized and he received some of the meds from the hospital pharmacy.    He has had his diuretic changed from Furosemide to now Torsemide.  He has been following to the regimen and has discarded his old diuretic.    Potasium:  Taking three per day.  Would like instructions on taking it twice a day.      Spironolactone:  Needs refill   Has only been taking 25 mg daily     Needs refills of torsemide and spironolactone sent to his home pharmacy.      Patient organizes the medication himself.  He is in contact with his pharmacy and is knowledgeable of medication frequencies.    ANTICOAGULATION:  12.5 MG Warfarin daily.   INR today was 2.8  No bruising or bleeding  No change in diet or activity .    OBJECTIVE  Creatinine   Date Value Ref Range Status   06/15/2018 0.98 0.66 - 1.25 mg/dL Final   ]    Liver Function Studies -   Recent Labs   Lab Test  06/04/18   1853   PROTTOTAL  6.8   ALBUMIN  3.3*   BILITOTAL  1.1   ALKPHOS  79   AST  16   ALT  22     Immunization History   Administered Date(s) Administered     Influenza (IIV3) PF 12/15/2005, 03/08/2007, 10/01/2010,  12/02/2011, 09/06/2017     Mantoux Tuberculin Skin Test 10/09/2017     Pneumo Conj 13-V (2010&after) 11/27/2017     TDAP Vaccine (Boostrix) 11/27/2017     Tdap (Adacel,Boostrix) 03/08/2007           ASSESSMENT     Drug Therapy Problems  Patient s recent hospitalization was likely/not likely related to medication use.        1) Heart Failure  ;         Currently managed on the following:    Spironolactone 25 mg twice daily    Torsemide 50 mg BID    Lisinopril 5 mg daily     Metoprolol 100 mg once daily      2) ANTICOAGULATION   Continue on 12.5 mg daily .  Follow up in 2 weeks with home care INR        Health literacy           General: Low/Moderate/High/Unknown  Comment: high    Medication list has been updated today and refills sent to pharmacy.      PLAN     Outpatient medication list has been updated to reflects patient s current medication use.      Follow up in 2 weeks with Home care INR.    MTM and medication reconciliation have been completed by pharmacy. Medication assessment and plan address patient s specific concerns and goals.     Dr. Sanchez was provided our recommendations in clinic today  before/after they saw patient and was available for supervision during the visit and is the authorizing prescriber for this visit through the pharmacist collaborative practice agreement.     Thank you for the opportunity to participate in the care of this patient.  Tj Palumbo, Pharm.D.  Appointment length: 30

## 2018-06-19 ENCOUNTER — TELEPHONE (OUTPATIENT)
Dept: FAMILY MEDICINE | Facility: CLINIC | Age: 68
End: 2018-06-19

## 2018-06-19 NOTE — TELEPHONE ENCOUNTER
I have called [facility] and clarified the Rx.  The spironolactone was filled and is ready for the patient to .  The Torsemide was filled at a different pharmacy and is refill too soon. Pharmacy has the new rx on file and will be able to fill this when its due.    I will call patient and clarify if there are any further concerns.  Called 06/20/18     Tj Palumbo PharmBradfordD.

## 2018-06-19 NOTE — TELEPHONE ENCOUNTER
Pharmacy requested we do a 60day supply  So    Torsemide  #60            (30pills/mo) (1/2 pill bid)  Spironolactone #120    (60pills/mo)  (1 pill bid)    Please inform patient    PHarper

## 2018-06-19 NOTE — TELEPHONE ENCOUNTER
[facility] phone call message- medication clarification/question:    Full Medication Name: Torsemide and Spironolactone  Dose: see chart    Question: the patient called to request that the edwige be changed to a 60 qty dispensed and the torsemide changed to a 30 qty.  The patient believes they were entered in error on quantity and needs them changed so he can get them dispensed today if possible.  Thanks!    Pharmacy confirmed as   [id number] - [place], [place] - [address]  [address]  [facility] [address]  Phone: [phone] Fax: [phone]  : Yes    OK to leave a message on voice mail? Yes    Call taken on June 19, 2018 at 11:57 AM by Jhoana Morgan

## 2018-06-19 NOTE — TELEPHONE ENCOUNTER
Message routed to PCP to update prescription as written below and send to pharmacy.    Carla Gonzales RN

## 2018-06-22 ENCOUNTER — TELEPHONE (OUTPATIENT)
Dept: FAMILY MEDICINE | Facility: CLINIC | Age: 68
End: 2018-06-22

## 2018-06-22 NOTE — TELEPHONE ENCOUNTER
Returned call to home care PT to give verbal orders per protocol as requested. PT verbalized understanding.    Elif Hartman RN

## 2018-06-22 NOTE — TELEPHONE ENCOUNTER
[facility] phone call message - order or referral request for patient:     Order or referral being requested: Skilled nursing, OT and PT      Additional Comments: Stanley from  home care nurse is asking a verbal order for skilled nursing for 1 time a week for 9 weeks and 3 prn visit. She also ask to continue PT and OT pervious order.     OK to leave a message on voice mail? Yes    Primary language: English      needed? No    Call taken on June 22, 2018 at 9:40 AM by Sandhya Cha

## 2018-06-22 NOTE — TELEPHONE ENCOUNTER
[facility] phone call message - order or referral request for patient:     Order or referral being requested: Orders for home pt for 1x a week for 3 weeks and then to notify the PCP that Delmy completed the initial home PT assessment on 06/22/18    Additional Comments: call Delmy with orders    OK to leave a message on voice mail? Yes    Primary language: English      needed? No    Call taken on June 22, 2018 at 4:48 PM by Jhoana Mogran

## 2018-06-22 NOTE — TELEPHONE ENCOUNTER
Returned call to home care nurse, unable to reach. Left VM with verbal orders per protocol as requested. Left callback number for questions.    Elif Hartman RN

## 2018-06-25 ENCOUNTER — MEDICAL CORRESPONDENCE (OUTPATIENT)
Dept: HEALTH INFORMATION MANAGEMENT | Facility: CLINIC | Age: 68
End: 2018-06-25

## 2018-06-28 ENCOUNTER — TELEPHONE (OUTPATIENT)
Dept: FAMILY MEDICINE | Facility: CLINIC | Age: 68
End: 2018-06-28

## 2018-06-28 NOTE — TELEPHONE ENCOUNTER
Outbound PharmD Call:    Intention of Call: Follow-up call to Stanley.    Stanley says that Clarke told her that he had an appointment on 6/18/18 when Dr. Sanchez said that he should have an INR in 10 days.  She says that she also read this in the note, but did not receive an order.    PharmD verifies this in Dr. Sanchez's note, approves INR order and gives verbal order today.    Stanley will do an INR tomorrow, 6/29/18 when she sees Clarke.     Farideh Nunez was the authorizing prescriber for this visit through the pharmacist collaborative practice agreement.  Marleny Bowers Pharm.D.

## 2018-06-28 NOTE — TELEPHONE ENCOUNTER
[facility] phone call message - order or referral request for patient:     Order or referral being requested: INR for tomorrow     Additional Comments:     OK to leave a message on voice mail? Yes    Primary language: English      needed? No    Call taken on June 28, 2018 at 8:24 AM by Amanda Dumont

## 2018-06-29 NOTE — TELEPHONE ENCOUNTER
[facility] phone call message - order or referral request for patient:     Order or referral being requested: INR    Additional Comments: Patient canceled for today's appointment, requesting that it is moved to next Friday the 6th.    OK to leave a message on voice mail? Yes    Primary language: English      needed? No    Call taken on June 29, 2018 at 8:38 AM by Amanda Dumont

## 2018-07-05 NOTE — TELEPHONE ENCOUNTER
RN returned call to Stanley who states she had an order to draw INR on 6/29 but pt canceled and rescheduled for 7/6. RN gave verbal order to draw INR on 7/6 since 6/29 INR order was not yet completed    Elif Hartman RN

## 2018-07-05 NOTE — TELEPHONE ENCOUNTER
Stanley Calderon home care nurse wants to schedule an INR for patient.   I explained that I could help her do that but she wants to do it.  She is requesting a call back from Nurse/Pharm D.  [phone]

## 2018-07-06 ENCOUNTER — TELEPHONE (OUTPATIENT)
Dept: FAMILY MEDICINE | Facility: CLINIC | Age: 68
End: 2018-07-06

## 2018-07-06 ENCOUNTER — OFFICE VISIT (OUTPATIENT)
Dept: ORTHOPEDICS | Facility: CLINIC | Age: 68
End: 2018-07-06
Payer: COMMERCIAL

## 2018-07-06 DIAGNOSIS — E11.65 TYPE 2 DIABETES MELLITUS WITH HYPERGLYCEMIA, WITHOUT LONG-TERM CURRENT USE OF INSULIN (H): Primary | ICD-10-CM

## 2018-07-06 DIAGNOSIS — I50.22 CHRONIC SYSTOLIC CONGESTIVE HEART FAILURE (H): ICD-10-CM

## 2018-07-06 DIAGNOSIS — L84 TYLOMA: ICD-10-CM

## 2018-07-06 DIAGNOSIS — B35.1 ONYCHOMYCOSIS: ICD-10-CM

## 2018-07-06 LAB — INR POINT OF CARE: 3.1 (ref 0.86–1.14)

## 2018-07-06 NOTE — TELEPHONE ENCOUNTER
INR Result: 3.1    Name of person that should receive call back: Flower Dueñas    Relationship to patient:     Okay to leave a voicemail: Yes    Is an  needed: No

## 2018-07-06 NOTE — MR AVS SNAPSHOT
After Visit Summary   7/6/2018    Clarke Moreira    MRN: [id number]           Patient Information     Date Of Birth          1950        Visit Information        Provider Department      7/6/2018 10:40 AM Jesús Lagos DPM [facility]        Today's Diagnoses     Type 2 diabetes mellitus with hyperglycemia, without long-term current use of insulin (H)    -  1    Onychomycosis        Tyloma           Follow-ups after your visit        Your next 10 appointments already scheduled     Jul 18, 2018  1:00 PM CDT   Lab with  LAB    [facility] ([facility])    [address]  1st Floor  [facility] [address] [phone]            Jul 18, 2018  1:30 PM CDT   (Arrive by 1:15 PM)   CORE RETURN with [facility] ([facility])    [address] [place] [address]  [address]  [facility] [id number] [phone]            Jul 23, 2018 11:20 AM CDT   Return Visit with [facility] ([facility])    [address] [place] [address],  [address]  [facility] [address]   [phone]            Sep 07, 2018 11:20 AM CDT   (Arrive by 11:05 AM)   RETURN FOOT/ANKLE with Jesús Lagos DPM   [facility] ([facility])    [address] [place] [address]  4th Floor  [facility] [id number] [phone]              Who to contact     Please call your clinic at [phone] to:    Ask questions about your health    Make or cancel appointments    Discuss your medicines    Learn about your test results    Speak to your doctor            Additional Information About Your Visit        MyChart Information     [employer] is an electronic gateway that provides easy, online access to your medical records. With [employer], you can request a clinic appointment, read your test results, renew a prescription or communicate with your care  team.     To sign up for [employer] visit the website at [email or web address]   You will be asked to enter the access code listed below, as well as some personal information. Please follow the directions to create your username and password.     Your access code is: [email or web address]  Expires: 2018  6:30 AM     Your access code will  in 90 days. If you need help or a new code, please contact your [facility] or call [phone] for assistance.        Care EveryWhere ID     This is your Care EveryWhere ID. This could be used by other organizations to access your [place] medical records  [id number]         Blood Pressure from Last 3 Encounters:   18 125/72   06/15/18 97/59   18 119/83    Weight from Last 3 Encounters:   18 (!) 170.4 kg (375 lb 9.6 oz)   06/15/18 (!) 171.5 kg (378 lb 1.6 oz)   18 (!) 167.8 kg (369 lb 14.9 oz)              We Performed the Following     DEBRIDEMENT OF NAILS, 6 OR MORE     TRIM HYPERKERATOTIC SKIN LESION, ONE        Primary Care Provider Office Phone # Fax #    Matthias Sanchez [phone]       [address] [address]        Equal Access to Services     TY[place]ER [place] : Hadii kell andujar hadasho Sodillan, waaxda luqadaha, qaybta kaalmada veronika, vasyl casarez . So [facility] [phone].    ATENCIÓN: Si habla español, tiene a kim disposición servicios gratuitos de asistencia lingüística. Alexis al [phone].    We comply with applicable federal civil rights laws and [place] laws. We do not discriminate on the basis of race, color, national origin, age, disability, sex, sexual orientation, or gender identity.            Thank you!     Thank you for choosing [facility]  for your care. Our goal is always to provide you with excellent care. Hearing back from our patients is one way we can continue to improve our services. Please take a few minutes  to complete the written survey that you may receive in the mail after your visit with us. Thank you!             Your Updated Medication List - Protect others around you: Learn how to safely use, store and throw away your medicines at www.disposemymeds.org.          This list is accurate as of 7/6/18 12:16 PM.  Always use your most recent med list.                   Brand Name Dispense Instructions for use Diagnosis    aspirin 81 MG tablet     30 tablet    Take 1 tablet (81 mg) by mouth daily    Essential hypertension       atorvastatin 40 MG tablet    LIPITOR    30 tablet    Take 1 tablet (40 mg) by mouth daily    Type 2 diabetes mellitus without complication, without long-term current use of insulin (H)       blood glucose monitoring lancets     100 each    Use to test blood sugars 2 times daily or as directed.    Diabetes mellitus, type 2 (H)       blood glucose monitoring meter device kit     1 kit    Use to test blood sugars 2 times daily or as directed.    Type 2 diabetes mellitus with hyperglycemia, without long-term current use of insulin (H)       blood glucose monitoring test strip    ONETOUCH ULTRA    100 strip    Use to test blood sugars 2 times daily or as directed.    Diabetes mellitus, type 2 (H)       levothyroxine 175 MCG tablet    SYNTHROID/LEVOTHROID    30 tablet    Take 1 tablet (175 mcg) by mouth daily    Hypothyroidism, unspecified type       lisinopril 5 MG tablet    PRINIVIL/ZESTRIL    60 tablet    Take 1 tablet (5 mg) by mouth daily Take in Pm    Chronic systolic congestive heart failure (H)       metFORMIN 500 MG tablet    GLUCOPHAGE    60 tablet    Take 1 tablet (500 mg) by mouth 2 times daily (with meals)    Type 2 diabetes mellitus without complication, without long-term current use of insulin (H)       metoprolol succinate 100 MG 24 hr tablet    TOPROL-XL    30 tablet    Take 1 tablet (100 mg) by mouth daily    Essential hypertension with goal blood pressure less than 140/90        multivitamin, therapeutic with minerals Tabs tablet     30 each    Take 1 tablet by mouth daily    Type 2 diabetes mellitus without complication, without long-term current use of insulin (H)       nystatin cream    MYCOSTATIN          omega 3 1000 MG Caps     30 capsule    Take 1 g by mouth daily    Hyperlipidemia LDL goal <100       * order for DME     2 each    Equipment being ordered: Other: Velcro Compression stockings.  Treatment Diagnosis: bilateral lymphedema.    Lymphedema of extremity       * order for DME     1 Units    Equipment being ordered: Bariatric Lift chair Diagnosis - morbid obesity, CHF, Lymphedema  Fax to Ne from [employer] at [phone].    Chronic systolic congestive heart failure (H), Lymphedema, Morbid obesity (H)       polyethylene glycol Packet    MIRALAX/GLYCOLAX    15 packet    Take 17 g by mouth daily as needed for constipation    Constipation, unspecified constipation type       potassium chloride SA 20 MEQ CR tablet    K-DUR/KLOR-CON M    90 tablet    Take 1 tablet (20 mEq) by mouth 3 times daily    Hypokalemia       senna-docusate 8.6-50 MG per tablet    SENOKOT-S;PERICOLACE    60 tablet    Take 1-2 tablets by mouth 2 times daily as needed for constipation    Constipation, unspecified constipation type       spironolactone 25 MG tablet    ALDACTONE    120 tablet    Take 1 tablet (25 mg) by mouth 2 times daily    Chronic systolic congestive heart failure (H)       tamsulosin 0.4 MG capsule    FLOMAX    30 capsule    Take 1 capsule (0.4 mg) by mouth daily    Urinary retention       torsemide 100 MG tablet    DEMADEX    60 tablet    Take 0.5 tablets (50 mg) by mouth 2 times daily    (HFpEF) heart failure with preserved ejection fraction (H)       vitamin B complex with vitamin C Tabs tablet      Take 1 tablet by mouth daily        VITAMIN C PO           VITAMIN E COMPLEX PO           warfarin 5 MG tablet    COUMADIN    75 tablet    Take 2.5 tablets (12.5 mg) by mouth daily     Persistent atrial fibrillation (H)       * Notice:  This list has 2 medication(s) that are the same as other medications prescribed for you. Read the directions carefully, and ask your doctor or other care provider to review them with you.

## 2018-07-06 NOTE — NURSING NOTE
Reason For Visit:   Chief Complaint   Patient presents with     RECHECK     63 day DB follow up, check shoe fit and toes       There were no vitals taken for this visit.    Pain Assessment  Patient Currently in Pain: Lauren Morrison ATC

## 2018-07-06 NOTE — LETTER
7/6/2018       RE: Clarke Moreira  [address]  [facility] [address]     Dear Colleague,    Thank you for referring your patient, Clarke Moreira, to the [facility]. Please see a copy of my visit note below.    Chief Complaint:   Chief Complaint   Patient presents with     RECHECK     63 day DB follow up        No Known Allergies      Subjective: Clarke is a 68 year old male who presents to the clinic today for a diabetic foot exam and management. Previous wound has healed. Does have a dressing on the callus of the left 2nd digit. Is now wearing velcro compression garments.     Objective  DP and PT pulses cannot be palpated.  Diminished pedal hair.    Protective sensation diminished.  He does relate some numbness in the bottom of both feet.  Nails are thickened, elongated, yellow, brittle, with subungual debris debris consistent with onychomycosis ×10. Onychocryptosis of the left medial hallux without s/s of infection.   There is some thickening to the right distal Achilles, with pain noted with palpation of this area. MMT 5/5 on the right, but is painful with plantarflexion of the ankle. No tendon voids noted.   Hyperkeratosis with intrasubstance bleeding noted to the left distal 2nd digit. Debrided down to a small laceration 1mm x 1mm. No s/s of infection noted.      Assessment: Type 2 diabetes with neuropathy.    Onychomycosis ×10.    Lymphedema  Achilles tendonitis on the right ankle.   Tyloma      Plan:  - Pt seen and evaluated.  - Nails were debrided x 10.   - Betadine and a bordered gauze to the left distal hallux daily.   - See again in 2 months  or sooner if problems arise.       Again, thank you for allowing me to participate in the care of your patient.      Sincerely,    Jesús Lagos DPM

## 2018-07-06 NOTE — TELEPHONE ENCOUNTER
"  Clinical Pharmacy Note  - Telephone encounter     Home care nurse calls today with a new INR result for Clarke CHINCHILLA Moreira  PCP:  Matthias Sanchez    Home Care nurse name: Flower Dueñas RN    Current warfarin dose: 12.5 mg daily     New concerns: none      INR today in the home:  3.1     Lab Results   Component Value Date    INR 2.8 06/18/2018    INR 1.50 06/08/2018    INR 1.48 06/07/2018    INR 1.65 06/06/2018    INR 1.81 06/05/2018    INR 2.03 06/04/2018         Assessment and Plan:  Within protocol INR goal range.  No change, but will follow up in 2 weeks.    Warfarin dose: 12.5 mg daily     Follow up date: 7/18/18    Please see \"[facility] Anticoagulation Flowsheet\"       Home care nurse confirms order today - repeats verbally    All medications were reviewed and found to be indicated, effective, safe and convenient unless drug therapy problems identified as described above.    Matthias Waller was provided our recommendations via routed note and Dr. Stephanie Cee   was available for supervision during this visit and is the authorizing prescriber for this visit through the pharmacist collaborative practice agreement.    Tj Palumbo, Pharm.D     "

## 2018-07-17 ENCOUNTER — PRE VISIT (OUTPATIENT)
Dept: CARDIOLOGY | Facility: CLINIC | Age: 68
End: 2018-07-17

## 2018-07-17 DIAGNOSIS — I50.20 HFREF (HEART FAILURE WITH REDUCED EJECTION FRACTION) (H): Primary | ICD-10-CM

## 2018-07-18 ENCOUNTER — TELEPHONE (OUTPATIENT)
Dept: FAMILY MEDICINE | Facility: CLINIC | Age: 68
End: 2018-07-18

## 2018-07-18 ENCOUNTER — OFFICE VISIT (OUTPATIENT)
Dept: CARDIOLOGY | Facility: CLINIC | Age: 68
End: 2018-07-18
Attending: NURSE PRACTITIONER
Payer: COMMERCIAL

## 2018-07-18 VITALS
WEIGHT: 315 LBS | DIASTOLIC BLOOD PRESSURE: 61 MMHG | RESPIRATION RATE: 18 BRPM | HEART RATE: 96 BPM | BODY MASS INDEX: 42.66 KG/M2 | OXYGEN SATURATION: 95 % | HEIGHT: 72 IN | SYSTOLIC BLOOD PRESSURE: 98 MMHG

## 2018-07-18 DIAGNOSIS — I48.20 CHRONIC ATRIAL FIBRILLATION (H): ICD-10-CM

## 2018-07-18 DIAGNOSIS — I50.20 HFREF (HEART FAILURE WITH REDUCED EJECTION FRACTION) (H): ICD-10-CM

## 2018-07-18 DIAGNOSIS — E87.6 HYPOKALEMIA: ICD-10-CM

## 2018-07-18 DIAGNOSIS — I50.22 CHRONIC SYSTOLIC CONGESTIVE HEART FAILURE (H): Primary | ICD-10-CM

## 2018-07-18 DIAGNOSIS — I10 ESSENTIAL HYPERTENSION WITH GOAL BLOOD PRESSURE LESS THAN 140/90: ICD-10-CM

## 2018-07-18 LAB
ANION GAP SERPL CALCULATED.3IONS-SCNC: 7 MMOL/L (ref 3–14)
BUN SERPL-MCNC: 20 MG/DL (ref 7–30)
CALCIUM SERPL-MCNC: 9.5 MG/DL (ref 8.5–10.1)
CHLORIDE SERPL-SCNC: 99 MMOL/L (ref 94–109)
CO2 SERPL-SCNC: 29 MMOL/L (ref 20–32)
CREAT SERPL-MCNC: 1.17 MG/DL (ref 0.66–1.25)
GFR SERPL CREATININE-BSD FRML MDRD: 62 ML/MIN/1.7M2
GLUCOSE SERPL-MCNC: 114 MG/DL (ref 70–99)
POTASSIUM SERPL-SCNC: 4.8 MMOL/L (ref 3.4–5.3)
SODIUM SERPL-SCNC: 136 MMOL/L (ref 133–144)

## 2018-07-18 PROCEDURE — 99214 OFFICE O/P EST MOD 30 MIN: CPT | Mod: ZP | Performed by: NURSE PRACTITIONER

## 2018-07-18 PROCEDURE — G0463 HOSPITAL OUTPT CLINIC VISIT: HCPCS | Mod: ZF

## 2018-07-18 PROCEDURE — 80048 BASIC METABOLIC PNL TOTAL CA: CPT | Performed by: NURSE PRACTITIONER

## 2018-07-18 RX ORDER — POTASSIUM CHLORIDE 1500 MG/1
20 TABLET, EXTENDED RELEASE ORAL 2 TIMES DAILY
Qty: 90 TABLET | Refills: 11 | Status: SHIPPED | OUTPATIENT
Start: 2018-07-18 | End: 2018-12-12

## 2018-07-18 ASSESSMENT — PAIN SCALES - GENERAL: PAINLEVEL: NO PAIN (0)

## 2018-07-18 NOTE — MR AVS SNAPSHOT
"              After Visit Summary   2018    Clarke Moreira    MRN: [id number]           Patient Information     Date Of Birth          1950        Visit Information        Provider Department      2018 1:30 PM Sybil Norman APRN CNP [facility]        Today's Diagnoses     Chronic systolic congestive heart failure (H)        Hypokalemia          Care Instructions    You were seen today in the Cardiovascular Clinic at the [facility].     Cardiology Providers you saw during your visit: Alisha KIRK, ULYSSES      1. Follow-up with Dr Sanchez regarding your INR of 3.4  2. Decrease potassium to one pill twice a day - goal would be to try to get you off this altogether        Please limit your fluid intake to 2 L (64 ounces) daily.  2 Liters a day = 8.5 cups, or 72 ounces.  Please limit your salt intake to 2 grams a day or less.    If you gain 2# in 24 hours or 5# in one week call Amira Doll RN so we can adjust your medications as needed over the phone.    Please feel free to call me with any questions or concerns.      Amira Doll RN BSN   [facility]  Cardiology Care Coordinator-Heart Failure Clinic    Questions and [phone].   First press #1 for the University and then press #3 for \"Medical Questions\" to reach us Cardiology Nurses.     On Call Cardiologist for after hours or on weekends: [phone]   option #4 and ask to speak to the on-call Cardiologist. Inform them you are a CORE/heart failure patient at the [place] [facility].        If you need a medication refill please contact your pharmacy.  Please allow 3 business days for your refill to be completed.  _______________________________________________________  C.O.R.E. CLINIC Cardiomyopathy, Optimization, Rehabilitation, Education   The C.O.R.E. CLINIC is a heart failure specialty clinic within the [facility] where you will work with specialized nurse " practitioners dedicated to helping patients with heart failure carefully adjust medications, receive education, and learn who and when to call if symptoms develop. They specialize in helping you better understand your condition, slow the progression of your disease, improve the length and quality of your life, help you detect future heart problems before they become life threatening, and avoid hospitalizations.  As always, thank you for trusting us with your health care needs!                Follow-ups after your visit        Additional Services     Follow-Up with CORE Clinic       3 mos with Dr Soni                  Your next 10 appointments already scheduled     Jul 23, 2018 11:20 AM CDT   Return Visit with [facility] ([facility])    [address],  [address]  [facility] [address]   [phone]            Sep 07, 2018 11:20 AM CDT   (Arrive by 11:05 AM)   [facility] ([facility]    [address]  4th Floor  [facility] [id number] [phone]            Oct 30, 2018  9:00 AM CDT   (Arrive by 8:45 AM)   RETURN HEART FAILURE with Christian Willoughby MD   [facility] ([facility])    [address]  [address]  [facility] [id number] [phone]              Future tests that were ordered for you today     Open Future Orders        Priority Expected Expires Ordered    Follow-Up with CORE Clinic Routine 10/1/2018 10/23/2018 7/18/2018            Who to contact     If you have questions or need follow up information about today's clinic visit or your schedule please contact [facility] directly at [phone].  Normal or non-critical lab and imaging results will be communicated to you by MyChart, letter or phone within 4 business days after the clinic has received the results. If you do not  hear from us within 7 days, please contact the clinic through [employer] or phone. If you have a critical or abnormal lab result, we will notify you by phone as soon as possible.  Submit refill requests through [employer] or call your pharmacy and they will forward the refill request to us. Please allow 3 business days for your refill to be completed.          Additional Information About Your Visit        Care EveryWhere ID     This is your Care EveryWhere ID. This could be used by other organizations to access your [facility] medical records  [id number]        Your Vitals Were     Pulse Respirations Height Pulse Oximetry BMI (Body Mass Index)       96 18 1.829 m (6') 95% 50.86 kg/m2        Blood Pressure from Last 3 Encounters:   07/18/18 98/61   06/18/18 125/72   06/15/18 97/59    Weight from Last 3 Encounters:   07/18/18 (!) 170.1 kg (375 lb)   06/18/18 (!) 170.4 kg (375 lb 9.6 oz)   06/15/18 (!) 171.5 kg (378 lb 1.6 oz)              We Performed the Following     Follow-Up with CORE Clinic          Today's Medication Changes          These changes are accurate as of 7/18/18  2:10 PM.  If you have any questions, ask your nurse or doctor.               These medicines have changed or have updated prescriptions.        Dose/Directions    potassium chloride SA 20 MEQ CR tablet   Commonly known as:  K-DUR/KLOR-CON M   Indication:  hypokalemia   This may have changed:  when to take this   Used for:  Hypokalemia   Changed by:  Sybil Norman T, APRN CNP        Dose:  20 mEq   Take 1 tablet (20 mEq) by mouth 2 times daily   Quantity:  90 tablet   Refills:  11            Where to get your medicines      These medications were sent to [id number] - [place], [place] - [address]  [address] [facility] [address]     Phone:  [phone]     potassium chloride SA 20 MEQ CR tablet                Primary Care Provider Office Phone # Fax #    Matthias Sanchez [phone]       [address]  [facility] [address]        Equal Access to Services     TYLER R[place] : Hadii aad ku hadraghulaly Cotton, wazenaidada matt, qasandorta vasyl gross. So [facility] [phone].    ATENCIÓN: Si habla español, tiene a kim disposición servicios gratuitos de asistencia lingüística. Kyleame al [phone].    We comply with applicable federal civil rights laws and [place] laws. We do not discriminate on the basis of race, color, national origin, age, disability, sex, sexual orientation, or gender identity.            Thank you!     Thank you for choosing [facility]  for your care. Our goal is always to provide you with excellent care. Hearing back from our patients is one way we can continue to improve our services. Please take a few minutes to complete the written survey that you may receive in the mail after your visit with us. Thank you!             Your Updated Medication List - Protect others around you: Learn how to safely use, store and throw away your medicines at www.disposemymeds.org.          This list is accurate as of 7/18/18  2:10 PM.  Always use your most recent med list.                   Brand Name Dispense Instructions for use Diagnosis    aspirin 81 MG tablet     30 tablet    Take 1 tablet (81 mg) by mouth daily    Essential hypertension       atorvastatin 40 MG tablet    LIPITOR    30 tablet    Take 1 tablet (40 mg) by mouth daily    Type 2 diabetes mellitus without complication, without long-term current use of insulin (H)       blood glucose monitoring lancets     100 each    Use to test blood sugars 2 times daily or as directed.    Diabetes mellitus, type 2 (H)       blood glucose monitoring meter device kit     1 kit    Use to test blood sugars 2 times daily or as directed.    Type 2 diabetes mellitus with hyperglycemia, without long-term current use of insulin (H)       blood glucose monitoring test strip    ONETOUCH ULTRA    100 strip    Use  to test blood sugars 2 times daily or as directed.    Diabetes mellitus, type 2 (H)       levothyroxine 175 MCG tablet    SYNTHROID/LEVOTHROID    30 tablet    Take 1 tablet (175 mcg) by mouth daily    Hypothyroidism, unspecified type       lisinopril 5 MG tablet    PRINIVIL/ZESTRIL    60 tablet    Take 1 tablet (5 mg) by mouth daily Take in Pm    Chronic systolic congestive heart failure (H)       metFORMIN 500 MG tablet    GLUCOPHAGE    60 tablet    Take 1 tablet (500 mg) by mouth 2 times daily (with meals)    Type 2 diabetes mellitus without complication, without long-term current use of insulin (H)       metoprolol succinate 100 MG 24 hr tablet    TOPROL-XL    30 tablet    Take 1 tablet (100 mg) by mouth daily    Essential hypertension with goal blood pressure less than 140/90       multivitamin, therapeutic with minerals Tabs tablet     30 each    Take 1 tablet by mouth daily    Type 2 diabetes mellitus without complication, without long-term current use of insulin (H)       nystatin cream    MYCOSTATIN          omega 3 1000 MG Caps     30 capsule    Take 1 g by mouth daily    Hyperlipidemia LDL goal <100       * order for DME     2 each    Equipment being ordered: Other: Velcro Compression stockings.  Treatment Diagnosis: bilateral lymphedema.    Lymphedema of extremity       * order for DME     1 Units    Equipment being ordered: Bariatric Lift chair Diagnosis - morbid obesity, CHF, Lymphedema  Fax to Ne from [employer] at [phone].    Chronic systolic congestive heart failure (H), Lymphedema, Morbid obesity (H)       polyethylene glycol Packet    MIRALAX/GLYCOLAX    15 packet    Take 17 g by mouth daily as needed for constipation    Constipation, unspecified constipation type       potassium chloride SA 20 MEQ CR tablet    K-DUR/KLOR-CON M    90 tablet    Take 1 tablet (20 mEq) by mouth 2 times daily    Hypokalemia       senna-docusate 8.6-50 MG per tablet    SENOKOT-S;PERICOLACE    60 tablet    Take  1-2 tablets by mouth 2 times daily as needed for constipation    Constipation, unspecified constipation type       spironolactone 25 MG tablet    ALDACTONE    120 tablet    Take 1 tablet (25 mg) by mouth 2 times daily    Chronic systolic congestive heart failure (H)       tamsulosin 0.4 MG capsule    FLOMAX    30 capsule    Take 1 capsule (0.4 mg) by mouth daily    Urinary retention       torsemide 100 MG tablet    DEMADEX    60 tablet    Take 0.5 tablets (50 mg) by mouth 2 times daily    (HFpEF) heart failure with preserved ejection fraction (H)       vitamin B complex with vitamin C Tabs tablet      Take 1 tablet by mouth daily        VITAMIN C PO           VITAMIN E COMPLEX PO           warfarin 5 MG tablet    COUMADIN    75 tablet    Take 2.5 tablets (12.5 mg) by mouth daily    Persistent atrial fibrillation (H)       * Notice:  This list has 2 medication(s) that are the same as other medications prescribed for you. Read the directions carefully, and ask your doctor or other care provider to review them with you.

## 2018-07-18 NOTE — LETTER
"7/18/2018      RE: Clarke Moreira  [address]  [facility] [address]       Dear Colleague,    Thank you for the opportunity to participate in the care of your patient, Clarke Moreira, at the [facility]. Please see a copy of my visit note below.    HPI:   Mr. Moreira is a 68 year old male with a past medical history including prior history of systolic heart failure, most recent LVEF normal, also hx of morbid obesity, atrial fibrillation s/p ablation 2008, THONG/alveolar hypoventilation, HTN, dyslipidemia, PAD, hypothyroidism, type2 DM, depression, anxiety, BPH. Presents to clinic for CORE follow-up. He was admitted 1/5/18-1/1-/18 for cellulitis and heart failure and diuresed ~20 lb. His last echo ordered by PCP showed EF 55-60% and mildly reduced RVSF. He was admitted 6/4 as a planned admission for RHC and coronary angiogram for bridging with heparin beforehand given BMI too high for Lovenox and recent ?TIA with high [id number] - though heparin was not ordered in the hospital. He was noted to be volume overloaded on day of admission so was diuresed ~20 lb before RHC. RHC: RA 10, PCWP 10, normal cardiac outputs. He was changed to torsemide 50 mg bid, lisinopril was decreased. His DC weight was 370 lb.     Since last CORE visit, he reports feeling great. Feels fluid status is stable. LE edema is \"the best it's ever been\". He is working with physical therapy without exertional chest pain or shortness of breath. He sleeps on incline without orthopnea or PND, wears CPAP at night. Sleeping well. He denies presyncope, syncope, palpitations. Denies chest pain with current level of exertion. BP at home 110s mostly/70s. No recent stroke-like sx, no bleeding issues on warfarin.     PAST MEDICAL HISTORY:  Past Medical History:   Diagnosis Date     Atrial fibrillation (H)      Basal cell carcinoma      Chronic anticoagulation      Diastolic heart " failure (H)      Dyslipidemia      Essential hypertension      Morbid obesity (H)      Sleep-disordered breathing      Type 2 diabetes mellitus (H)        FAMILY HISTORY:  Family History   Problem Relation Age of Onset     Depression Mother      Glaucoma Maternal Grandfather      Cancer No family hx of      No family history of skin cancer     Macular Degeneration No family hx of        SOCIAL HISTORY:  Social History     Social History     Marital status: Single     Spouse name: N/A     Number of children: N/A     Years of education: N/A     Occupational History     Not on file.     Social History Main Topics     Smoking status: Never Smoker     Smokeless tobacco: Never Used     Alcohol use No      Comment: Former alcohol abuse. Quit 70s then quit later in 80s-present     Drug use: No      Comment: history of LSD use     Sexual activity: Not on file     Other Topics Concern     Not on file     Social History Narrative    Lives in an apartment in 16th floor near hospital.  Has elevators, unable to use stairs. Not able to shop; has things delivered to him including food. Difficulty completing cleaning. Goes to PCP once yearly for annual check up and medication review.       CURRENT MEDICATIONS:    Current Outpatient Prescriptions on File Prior to Visit:  Ascorbic Acid (VITAMIN C PO)    aspirin 81 MG tablet Take 1 tablet (81 mg) by mouth daily   atorvastatin (LIPITOR) 40 MG tablet Take 1 tablet (40 mg) by mouth daily   blood glucose monitoring (ONE TOUCH DELICA) lancets Use to test blood sugars 2 times daily or as directed.   blood glucose monitoring (ONE TOUCH ULTRA MINI) meter device kit Use to test blood sugars 2 times daily or as directed.   blood glucose monitoring (ONETOUCH ULTRA) test strip Use to test blood sugars 2 times daily or as directed.   levothyroxine (SYNTHROID/LEVOTHROID) 175 MCG tablet Take 1 tablet (175 mcg) by mouth daily   lisinopril (PRINIVIL/ZESTRIL) 5 MG tablet Take 1 tablet (5 mg) by mouth  daily Take in Pm   metFORMIN (GLUCOPHAGE) 500 MG tablet Take 1 tablet (500 mg) by mouth 2 times daily (with meals)   metoprolol (TOPROL-XL) 100 MG 24 hr tablet Take 1 tablet (100 mg) by mouth daily   multivitamin, therapeutic with minerals (MULTI-VITAMIN) TABS tablet Take 1 tablet by mouth daily   nystatin (MYCOSTATIN) cream    omega 3 1000 MG CAPS Take 1 g by mouth daily   order for DME Equipment being ordered: Bariatric Lift chairDiagnosis - morbid obesity, CHF, LymphedemaFax to Ne from [employer] at [phone].   order for DME Equipment being ordered: Other: Velcro Compression stockings. Treatment Diagnosis: bilateral lymphedema.   polyethylene glycol (MIRALAX/GLYCOLAX) Packet Take 17 g by mouth daily as needed for constipation   potassium chloride SA (K-DUR/KLOR-CON M) 20 MEQ CR tablet Take 1 tablet (20 mEq) by mouth 3 times daily   senna-docusate (SENOKOT-S;PERICOLACE) 8.6-50 MG per tablet Take 1-2 tablets by mouth 2 times daily as needed for constipation   spironolactone (ALDACTONE) 25 MG tablet Take 1 tablet (25 mg) by mouth 2 times daily   tamsulosin (FLOMAX) 0.4 MG capsule Take 1 capsule (0.4 mg) by mouth daily   torsemide (DEMADEX) 100 MG tablet Take 0.5 tablets (50 mg) by mouth 2 times daily   vitamin B complex with vitamin C (VITAMIN  B COMPLEX) TABS tablet Take 1 tablet by mouth daily   VITAMIN E COMPLEX PO    warfarin (COUMADIN) 5 MG tablet Take 2.5 tablets (12.5 mg) by mouth daily     No current facility-administered medications on file prior to visit.     ROS:   CONSTITUTIONAL: Denies fever, chills, fatigue, or weight fluctuations.    HEENT: Denies headache, vision changes, and changes in speech.   CV: Refer to HPI.   PULMONARY:Refer to HPI.   GI:Denies nausea, vomiting, diarrhea, and abdominal pain. Bowel movements are regular.   :Denies urinary alterations, dysuria, urinary frequency, hematuria, and abnormal drainage.   EXT:+chronic lower extremity edema.   SKIN:Denies abnormal rashes or  lesions.   MUSCULOSKELETAL:Denies upper or lower extremity weakness and pain.   NEUROLOGIC:Denies lightheadedness, dizziness, seizures, or upper or lower extremity paresthesia.     EXAM:  BP 98/61  Pulse 96  Resp 18  Ht 1.829 m (6')  Wt (!) 170.1 kg (375 lb)  SpO2 95%  BMI 50.86 kg/m2     GENERAL: Appears comfortable, in no acute distress.   HEENT: Eye symmetrical, no discharge or icterus bilaterally. Mucous membranes moist and without lesions.  CV: RRR, distant S1S2, no appreciable murmur, rub, or gallop. JVP not appreciable at 90 degrees (examined in WC).   RESPIRATORY: Respirations regular, even, and unlabored. Lungs CTA throughout.   GI: Soft, obese, and non distended with normoactive bowel sounds present in all quadrants. No tenderness, rebound, guarding. No hepatomegaly.   EXTREMITIES: Trace bilat peripheral edema. 2+ bilateral pedal pulses.   NEUROLOGIC: Alert and oriented x 3. No focal deficits.   MUSCULOSKELETAL: No joint swelling or tenderness.   SKIN: No jaundice. No rashes.     Labs, reviewed with patient in clinic today:  CBC RESULTS:  Lab Results   Component Value Date    WBC 9.3 06/04/2018    RBC 4.37 (L) 06/04/2018    HGB 15.5 06/18/2018    HCT 50.5 06/18/2018    MCV 97.9 06/18/2018    MCH 30.0 06/18/2018    MCHC 30.7 (L) 06/18/2018    RDW 13.7 06/04/2018     06/04/2018       CMP RESULTS:  Lab Results   Component Value Date     07/18/2018    POTASSIUM 4.8 07/18/2018    CHLORIDE 99 07/18/2018    CO2 29 07/18/2018    ANIONGAP 7 07/18/2018     (H) 07/18/2018    BUN 20 07/18/2018    CR 1.17 07/18/2018    GFRESTIMATED 62 07/18/2018    GFRESTBLACK 75 07/18/2018    CHERI 9.5 07/18/2018    BILITOTAL 1.1 06/04/2018    ALBUMIN 3.3 (L) 06/04/2018    ALKPHOS 79 06/04/2018    ALT 22 06/04/2018    AST 16 06/04/2018        INR RESULTS:  Lab Results   Component Value Date    INR 3.1 (A) 07/06/2018    INR 2.8 06/18/2018       Lab Results   Component Value Date    MAG 2.3 06/08/2018     Lab  Results   Component Value Date    NTBNPI 1621 (H) 06/04/2018     Lab Results   Component Value Date    NTBNP 1206 (H) 06/18/2018       Diagnostics:  ECG 1/6/18  Atrial fibrillation with PVCs, multifocal    TTE 5/18/18  Technically difficult study. The patient's rhythm is atrial fibrillation.  Global and regional left ventricular function is normal with an EF of 55-60%.  Global right ventricular function is mildly reduced.  No significant valvular abnormalities were noted.  Dilation of the inferior vena cava is present with normal respiratory  variation in diameter.Estimated mean right atrial pressure is 8 mmHg.  No pericardial effusion is present    [facility] 6/6/18        Assessment and Plan:   Mr. Moreira is a 68 year old male with prior history of systolic heart failure, most recent LVEF normal, also hx of morbid obesity, atrial fibrillation, THONG/alveolar hypoventilation, HTN who presents to CORE clinic for follow-up. Today he appears fairly euvolemic and subjectively he is feeling great. No changes except decrease KCl.     # Chronic borderline diastolic heart failure/HFpEF with improved EF   Stage C. NYHA Class III.    Fluid status: euvolemic, continue torsemide 50 mg bid with extra dose PRN for weight gain or swelling  ACEi/ARB/ARNI: lisinopril 5 mg daily at HS   BB: Toprol 100 mg daily for HTN and rate control  Aldosterone antagonist: spironolactone 25 mg bid (despite normal EF now, some benefit shown in TOPCAT trial)  SCD prophylaxis: n/a EF>40%  Ischemic evaluation: negative coronary angiogram 6/2018  Remote monitoring: uses [employer]     # HTN  -continue lisinpril and Toprol    # Atrial fibrillation ?permanant, hx of ablation per patient   [id number]   -he is on warfarin and Toprol  -may need EP f/u    # THONG/alveolar hypoventilation  -encouraged consistent CPAP use       Follow up in 3 mos with Dr Willoughby      25 minutes spent face-to-face with patient, >50% in counseling and/or coordination of care as  described above        Alisha Norman DNP, NP-C  7/18/2018      CC  GEOFFREY PARSONS

## 2018-07-18 NOTE — PATIENT INSTRUCTIONS
"You were seen today in the Cardiovascular Clinic at the [facility].     Cardiology Providers you saw during your visit: Alisha KIRK CNP      1. Follow-up with Dr Sanchez regarding your INR of 3.4  2. Decrease potassium to one pill twice a day - goal would be to try to get you off this altogether        Please limit your fluid intake to 2 L (64 ounces) daily.  2 Liters a day = 8.5 cups, or 72 ounces.  Please limit your salt intake to 2 grams a day or less.    If you gain 2# in 24 hours or 5# in one week call Amira Doll RN so we can adjust your medications as needed over the phone.    Please feel free to call me with any questions or concerns.      Amira Doll RN BSN   [facility]  Cardiology Care Coordinator-Heart Failure Clinic    Questions and [phone].   First press #1 for the University and then press #3 for \"Medical Questions\" to reach us Cardiology Nurses.     On Call Cardiologist for after hours or on weekends: [phone]   option #4 and ask to speak to the on-call Cardiologist. Inform them you are a CORE/heart failure patient at the [facility].        If you need a medication refill please contact your pharmacy.  Please allow 3 business days for your refill to be completed.  _______________________________________________________  C.O.R.E. CLINIC Cardiomyopathy, Optimization, Rehabilitation, Education   The C.O.R.E. CLINIC is a heart failure specialty clinic within the [facility] where you will work with specialized nurse practitioners dedicated to helping patients with heart failure carefully adjust medications, receive education, and learn who and when to call if symptoms develop. They specialize in helping you better understand your condition, slow the progression of your disease, improve the length and quality of your life, help you detect future heart problems before they become life threatening, and avoid " hospitalizations.  As always, thank you for trusting us with your health care needs!

## 2018-07-18 NOTE — PROGRESS NOTES
"HPI:   Mr. Moreira is a 68 year old male with a past medical history including prior history of systolic heart failure, most recent LVEF normal, also hx of morbid obesity, atrial fibrillation s/p ablation 2008, THONG/alveolar hypoventilation, HTN, dyslipidemia, PAD, hypothyroidism, type2 DM, depression, anxiety, BPH. Presents to clinic for CORE follow-up. He was admitted 1/5/18-1/1-/18 for cellulitis and heart failure and diuresed ~20 lb. His last echo ordered by PCP showed EF 55-60% and mildly reduced RVSF. He was admitted 6/4 as a planned admission for RHC and coronary angiogram for bridging with heparin beforehand given BMI too high for Lovenox and recent ?TIA with high [id number] - though heparin was not ordered in the hospital. He was noted to be volume overloaded on day of admission so was diuresed ~20 lb before RHC. RHC: RA 10, PCWP 10, normal cardiac outputs. He was changed to torsemide 50 mg bid, lisinopril was decreased. His DC weight was 370 lb.     Since last CORE visit, he reports feeling great. Feels fluid status is stable. LE edema is \"the best it's ever been\". He is working with physical therapy without exertional chest pain or shortness of breath. He sleeps on incline without orthopnea or PND, wears CPAP at night. Sleeping well. He denies presyncope, syncope, palpitations. Denies chest pain with current level of exertion. BP at home 110s mostly/70s. No recent stroke-like sx, no bleeding issues on warfarin.     PAST MEDICAL HISTORY:  Past Medical History:   Diagnosis Date     Atrial fibrillation (H)      Basal cell carcinoma      Chronic anticoagulation      Diastolic heart failure (H)      Dyslipidemia      Essential hypertension      Morbid obesity (H)      Sleep-disordered breathing      Type 2 diabetes mellitus (H)        FAMILY HISTORY:  Family History   Problem Relation Age of Onset     Depression Mother      Glaucoma Maternal Grandfather      Cancer No family hx of      No family history of " skin cancer     Macular Degeneration No family hx of        SOCIAL HISTORY:  Social History     Social History     Marital status: Single     Spouse name: N/A     Number of children: N/A     Years of education: N/A     Occupational History     Not on file.     Social History Main Topics     Smoking status: Never Smoker     Smokeless tobacco: Never Used     Alcohol use No      Comment: Former alcohol abuse. Quit 70s then quit later in 80s-present     Drug use: No      Comment: history of LSD use     Sexual activity: Not on file     Other Topics Concern     Not on file     Social History Narrative    Lives in an apartment in 16th floor near hospital.  Has elevators, unable to use stairs. Not able to shop; has things delivered to him including food. Difficulty completing cleaning. Goes to PCP once yearly for annual check up and medication review.       CURRENT MEDICATIONS:    Current Outpatient Prescriptions on File Prior to Visit:  Ascorbic Acid (VITAMIN C PO)    aspirin 81 MG tablet Take 1 tablet (81 mg) by mouth daily   atorvastatin (LIPITOR) 40 MG tablet Take 1 tablet (40 mg) by mouth daily   blood glucose monitoring (ONE TOUCH DELICA) lancets Use to test blood sugars 2 times daily or as directed.   blood glucose monitoring (ONE TOUCH ULTRA MINI) meter device kit Use to test blood sugars 2 times daily or as directed.   blood glucose monitoring (ONETOUCH ULTRA) test strip Use to test blood sugars 2 times daily or as directed.   levothyroxine (SYNTHROID/LEVOTHROID) 175 MCG tablet Take 1 tablet (175 mcg) by mouth daily   lisinopril (PRINIVIL/ZESTRIL) 5 MG tablet Take 1 tablet (5 mg) by mouth daily Take in Pm   metFORMIN (GLUCOPHAGE) 500 MG tablet Take 1 tablet (500 mg) by mouth 2 times daily (with meals)   metoprolol (TOPROL-XL) 100 MG 24 hr tablet Take 1 tablet (100 mg) by mouth daily   multivitamin, therapeutic with minerals (MULTI-VITAMIN) TABS tablet Take 1 tablet by mouth daily   nystatin (MYCOSTATIN) cream     omega 3 1000 MG CAPS Take 1 g by mouth daily   order for DME Equipment being ordered: Bariatric Lift chairDiagnosis - morbid obesity, CHF, LymphedemaFax to Ne from [employer] at [phone].   order for DME Equipment being ordered: Other: Velcro Compression stockings. Treatment Diagnosis: bilateral lymphedema.   polyethylene glycol (MIRALAX/GLYCOLAX) Packet Take 17 g by mouth daily as needed for constipation   potassium chloride SA (K-DUR/KLOR-CON M) 20 MEQ CR tablet Take 1 tablet (20 mEq) by mouth 3 times daily   senna-docusate (SENOKOT-S;PERICOLACE) 8.6-50 MG per tablet Take 1-2 tablets by mouth 2 times daily as needed for constipation   spironolactone (ALDACTONE) 25 MG tablet Take 1 tablet (25 mg) by mouth 2 times daily   tamsulosin (FLOMAX) 0.4 MG capsule Take 1 capsule (0.4 mg) by mouth daily   torsemide (DEMADEX) 100 MG tablet Take 0.5 tablets (50 mg) by mouth 2 times daily   vitamin B complex with vitamin C (VITAMIN  B COMPLEX) TABS tablet Take 1 tablet by mouth daily   VITAMIN E COMPLEX PO    warfarin (COUMADIN) 5 MG tablet Take 2.5 tablets (12.5 mg) by mouth daily     No current facility-administered medications on file prior to visit.     ROS:   CONSTITUTIONAL: Denies fever, chills, fatigue, or weight fluctuations.    HEENT: Denies headache, vision changes, and changes in speech.   CV: Refer to HPI.   PULMONARY:Refer to HPI.   GI:Denies nausea, vomiting, diarrhea, and abdominal pain. Bowel movements are regular.   :Denies urinary alterations, dysuria, urinary frequency, hematuria, and abnormal drainage.   EXT:+chronic lower extremity edema.   SKIN:Denies abnormal rashes or lesions.   MUSCULOSKELETAL:Denies upper or lower extremity weakness and pain.   NEUROLOGIC:Denies lightheadedness, dizziness, seizures, or upper or lower extremity paresthesia.     EXAM:  BP 98/61  Pulse 96  Resp 18  Ht 1.829 m (6')  Wt (!) 170.1 kg (375 lb)  SpO2 95%  BMI 50.86 kg/m2     GENERAL: Appears comfortable, in no  acute distress.   HEENT: Eye symmetrical, no discharge or icterus bilaterally. Mucous membranes moist and without lesions.  CV: RRR, distant S1S2, no appreciable murmur, rub, or gallop. JVP not appreciable at 90 degrees (examined in WC).   RESPIRATORY: Respirations regular, even, and unlabored. Lungs CTA throughout.   GI: Soft, obese, and non distended with normoactive bowel sounds present in all quadrants. No tenderness, rebound, guarding. No hepatomegaly.   EXTREMITIES: Trace bilat peripheral edema. 2+ bilateral pedal pulses.   NEUROLOGIC: Alert and oriented x 3. No focal deficits.   MUSCULOSKELETAL: No joint swelling or tenderness.   SKIN: No jaundice. No rashes.     Labs, reviewed with patient in clinic today:  CBC RESULTS:  Lab Results   Component Value Date    WBC 9.3 06/04/2018    RBC 4.37 (L) 06/04/2018    HGB 15.5 06/18/2018    HCT 50.5 06/18/2018    MCV 97.9 06/18/2018    MCH 30.0 06/18/2018    MCHC 30.7 (L) 06/18/2018    RDW 13.7 06/04/2018     06/04/2018       CMP RESULTS:  Lab Results   Component Value Date     07/18/2018    POTASSIUM 4.8 07/18/2018    CHLORIDE 99 07/18/2018    CO2 29 07/18/2018    ANIONGAP 7 07/18/2018     (H) 07/18/2018    BUN 20 07/18/2018    CR 1.17 07/18/2018    GFRESTIMATED 62 07/18/2018    GFRESTBLACK 75 07/18/2018    CHERI 9.5 07/18/2018    BILITOTAL 1.1 06/04/2018    ALBUMIN 3.3 (L) 06/04/2018    ALKPHOS 79 06/04/2018    ALT 22 06/04/2018    AST 16 06/04/2018        INR RESULTS:  Lab Results   Component Value Date    INR 3.1 (A) 07/06/2018    INR 2.8 06/18/2018       Lab Results   Component Value Date    MAG 2.3 06/08/2018     Lab Results   Component Value Date    NTBNPI 1621 (H) 06/04/2018     Lab Results   Component Value Date    NTBNP 1206 (H) 06/18/2018       Diagnostics:  ECG 1/6/18  Atrial fibrillation with PVCs, multifocal    TTE 5/18/18  Technically difficult study. The patient's rhythm is atrial fibrillation.  Global and regional left ventricular  function is normal with an EF of 55-60%.  Global right ventricular function is mildly reduced.  No significant valvular abnormalities were noted.  Dilation of the inferior vena cava is present with normal respiratory  variation in diameter.Estimated mean right atrial pressure is 8 mmHg.  No pericardial effusion is present    [facility] 6/6/18        Assessment and Plan:   Mr. Moreira is a 68 year old male with prior history of systolic heart failure, most recent LVEF normal, also hx of morbid obesity, atrial fibrillation, THONG/alveolar hypoventilation, HTN who presents to CORE clinic for follow-up. Today he appears fairly euvolemic and subjectively he is feeling great. No changes except decrease KCl.     # Chronic borderline diastolic heart failure/HFpEF with improved EF   Stage C. NYHA Class III.    Fluid status: euvolemic, continue torsemide 50 mg bid with extra dose PRN for weight gain or swelling  ACEi/ARB/ARNI: lisinopril 5 mg daily at HS   BB: Toprol 100 mg daily for HTN and rate control  Aldosterone antagonist: spironolactone 25 mg bid (despite normal EF now, some benefit shown in TOPCAT trial)  SCD prophylaxis: n/a EF>40%  Ischemic evaluation: negative coronary angiogram 6/2018  Remote monitoring: uses [employer]     # HTN  -continue lisinpril and Toprol    # Atrial fibrillation ?permanant, hx of ablation per patient   [id number]   -he is on warfarin and Toprol  -may need EP f/u    # THONG/alveolar hypoventilation  -encouraged consistent CPAP use       Follow up in 3 mos with Dr Willoughby      25 minutes spent face-to-face with patient, >50% in counseling and/or coordination of care as described above        Alisha Norman DNP, NP-C  7/18/2018            GEOFFREY BE

## 2018-07-18 NOTE — TELEPHONE ENCOUNTER
INR Result: 3.4    Name of person that should receive call back: Stanley    Relationship to patient: Home Care Nurse    Okay to leave a voicemail: Yes    Is an  needed: No

## 2018-07-18 NOTE — NURSING NOTE
Chief Complaint   Patient presents with     RECHECK     Chronic systolic congestive heart failure 1 month CORE follow up     Manas Magallon CMA at 1:39 PM on 7/18/2018     Medications and vitals reviewed with patient and confirmed.

## 2018-07-23 NOTE — TELEPHONE ENCOUNTER
"  Clinical Pharmacy Note  - Telephone encounter     Home care nurse calls today with a new INR result for Clarke CHINCHILLA Moreira  PCP:  Matthias Sanchez    Home Care nurse name: Juan RN    Call and spoke to Clarke     Current warfarin dose: 12.5 mg daily     New concerns: new bruising on his arms.  Now fading.      INR today in the home:  3.4     Lab Results   Component Value Date    INR 3.1 2018    INR 2.8 2018    INR 1.50 2018    INR 1.48 2018    INR 1.65 2018    INR 1.81 2018    INR 2.03 2018         Assessment and Plan:  INR above goal.  Will  warfarin dose 2.5%    Warfarin dose: 10 mg x 1 day and 12.5 mg x 6 days    Follow up date: 2 weeks    Please see \"[facility] Anticoagulation Flowsheet\"     Pt. Repeats warfarin directions over the phone    All medications were reviewed and found to be indicated, effective, safe and convenient unless drug therapy problems identified as described above.    Matthias Waller was provided our recommendations via routed note and Dr. Sanchez was available for supervision during this visit and is the authorizing prescriber for this visit through the pharmacist collaborative practice agreement.    Tj Palumbo, Pharm.D     "

## 2018-07-24 ENCOUNTER — TELEPHONE (OUTPATIENT)
Dept: CARE COORDINATION | Facility: CLINIC | Age: 68
End: 2018-07-24

## 2018-07-24 NOTE — TELEPHONE ENCOUNTER
SW received second request from [facility] transportation to complete a PCS form for pt's transport on 6/8/18.  SW reviewed chart and had documented wheelchair based ride.  Per [facility] billing department, pt was transported by stretcher due to bariatric status.  SW completed PCS form noting bariatric needs as per [facility] billing request.  Original faxed to [facility] billing department and copy sent to HIM to be placed in pt's chart.    JANE Arriaza, APSW  [address]   Phone: [phone]  Pager: [phone]  Unit: [phone]

## 2018-08-03 DIAGNOSIS — L85.3 DRY SKIN: ICD-10-CM

## 2018-08-03 DIAGNOSIS — L28.0 LICHEN OF SKIN: ICD-10-CM

## 2018-08-03 NOTE — TELEPHONE ENCOUNTER
ammonium lactate (AMLACTIN) 12 % cream (Discontinued)   9/23/2017 10/8/2017 --      Sig - Route: Apply topically 2 times daily as needed for dry skin - Topical     Class: E-Prescribe     Reason for Discontinue: Patient Discharge     Order: [id number]

## 2018-08-06 ENCOUNTER — TELEPHONE (OUTPATIENT)
Dept: ORTHOPEDICS | Facility: CLINIC | Age: 68
End: 2018-08-06

## 2018-08-06 NOTE — TELEPHONE ENCOUNTER
[facility] Call Center    Phone Message    May a detailed message be left on voicemail: yes    Reason for Call: Other: Pt needing appt w/ Dr. Lagos asap. Pt stated Dr. Lagos did a procedure on him on his 3rd toe on his L foot and it is not healing properly and another physician told him he needed to be seen asap     Action Taken: Message routed to:  [facility] ([facility] ortho clinic

## 2018-08-06 NOTE — TELEPHONE ENCOUNTER
Left message notifying him that he could be seen by Dr. Lagos's PA, Paula Emery on Wednesday this week. Patient was asked to call back to schedule at a time that works best for him that day.

## 2018-08-07 RX ORDER — AMMONIUM LACTATE 12 G/100G
CREAM TOPICAL 2 TIMES DAILY PRN
Qty: 385 G | Refills: 1 | Status: ON HOLD | OUTPATIENT
Start: 2018-08-07 | End: 2019-03-16

## 2018-08-08 ENCOUNTER — OFFICE VISIT (OUTPATIENT)
Dept: ORTHOPEDICS | Facility: CLINIC | Age: 68
End: 2018-08-08
Payer: COMMERCIAL

## 2018-08-08 DIAGNOSIS — B35.1 DERMATOPHYTOSIS OF NAIL: ICD-10-CM

## 2018-08-08 DIAGNOSIS — E11.621 DIABETIC ULCER OF TOE OF LEFT FOOT ASSOCIATED WITH TYPE 2 DIABETES MELLITUS, WITH FAT LAYER EXPOSED (H): Primary | ICD-10-CM

## 2018-08-08 DIAGNOSIS — L60.0 INGROWN TOENAIL: ICD-10-CM

## 2018-08-08 DIAGNOSIS — E11.65 TYPE 2 DIABETES MELLITUS WITH HYPERGLYCEMIA, WITHOUT LONG-TERM CURRENT USE OF INSULIN (H): ICD-10-CM

## 2018-08-08 DIAGNOSIS — L97.522 DIABETIC ULCER OF TOE OF LEFT FOOT ASSOCIATED WITH TYPE 2 DIABETES MELLITUS, WITH FAT LAYER EXPOSED (H): Primary | ICD-10-CM

## 2018-08-08 NOTE — NURSING NOTE
Reason For Visit:   Chief Complaint   Patient presents with     Consult     Wound, left 2nd toe. Type 2 Diabetic.        Pain Assessment  Patient Currently in Pain: Denies         Current Outpatient Prescriptions   Medication Sig Dispense Refill     ammonium lactate (LAC-HYDRIN) 12 % cream Apply topically 2 times daily as needed for dry skin 385 g 1     Ascorbic Acid (VITAMIN C PO)        aspirin 81 MG tablet Take 1 tablet (81 mg) by mouth daily 30 tablet 0     atorvastatin (LIPITOR) 40 MG tablet Take 1 tablet (40 mg) by mouth daily 30 tablet 11     blood glucose monitoring (ONE TOUCH DELICA) lancets Use to test blood sugars 2 times daily or as directed. 100 each 11     blood glucose monitoring (ONE TOUCH ULTRA MINI) meter device kit Use to test blood sugars 2 times daily or as directed. 1 kit 0     blood glucose monitoring (ONETOUCH ULTRA) test strip Use to test blood sugars 2 times daily or as directed. 100 strip 11     levothyroxine (SYNTHROID/LEVOTHROID) 175 MCG tablet Take 1 tablet (175 mcg) by mouth daily 30 tablet 11     lisinopril (PRINIVIL/ZESTRIL) 5 MG tablet Take 1 tablet (5 mg) by mouth daily Take in Pm 60 tablet 3     metFORMIN (GLUCOPHAGE) 500 MG tablet Take 1 tablet (500 mg) by mouth 2 times daily (with meals) 60 tablet 11     metoprolol (TOPROL-XL) 100 MG 24 hr tablet Take 1 tablet (100 mg) by mouth daily 30 tablet 11     multivitamin, therapeutic with minerals (MULTI-VITAMIN) TABS tablet Take 1 tablet by mouth daily 30 each 11     nystatin (MYCOSTATIN) cream   1     omega 3 1000 MG CAPS Take 1 g by mouth daily 30 capsule 11     order for DME Equipment being ordered: Bariatric Lift chair  Diagnosis - morbid obesity, CHF, Lymphedema    Fax to Ne from [employer] at [phone]. 1 Units 0     order for DME Equipment being ordered: Other: Velcro Compression stockings.   Treatment Diagnosis: bilateral lymphedema. 2 each 0     polyethylene glycol (MIRALAX/GLYCOLAX) Packet Take 17 g by mouth daily as  needed for constipation 15 packet 11     potassium chloride SA (K-DUR/KLOR-CON M) 20 MEQ CR tablet Take 1 tablet (20 mEq) by mouth 2 times daily 90 tablet 11     senna-docusate (SENOKOT-S;PERICOLACE) 8.6-50 MG per tablet Take 1-2 tablets by mouth 2 times daily as needed for constipation 60 tablet 11     spironolactone (ALDACTONE) 25 MG tablet Take 1 tablet (25 mg) by mouth 2 times daily 120 tablet 3     tamsulosin (FLOMAX) 0.4 MG capsule Take 1 capsule (0.4 mg) by mouth daily 30 capsule 11     torsemide (DEMADEX) 100 MG tablet Take 0.5 tablets (50 mg) by mouth 2 times daily 60 tablet 3     vitamin B complex with vitamin C (VITAMIN  B COMPLEX) TABS tablet Take 1 tablet by mouth daily       VITAMIN E COMPLEX PO        warfarin (COUMADIN) 5 MG tablet Take 2.5 tablets (12.5 mg) by mouth daily 75 tablet 11        No Known Allergies

## 2018-08-08 NOTE — LETTER
8/8/2018       RE: Clarke Moreira  [address]  [facility] [address]     Dear Colleague,    Thank you for referring your patient, Clakre Moreira, to the [facility]. Please see a copy of my visit note below.    Chief Complaint:   Chief Complaint   Patient presents with     Consult     Wound, left 2nd toe. Type 2 Diabetic.        Subjective: Clarke is a 68 year old male who presents to the clinic today for evaluation of left second digit wound and ingrown toenail. Since last evaluated by Dr. Lagos, care providers have noticed a wound form on the distal second digit. He believes it is from the nail pressure or his new bariatric shoes. Also admits pain to the left great toe and believes the nail is ingrown. Has not noticed drainage and denies redness or swelling to the toe. Mild pain to the L second digit.    Past Medical History:   Diagnosis Date     Atrial fibrillation (H)      Basal cell carcinoma      Chronic anticoagulation      Diastolic heart failure (H)      Dyslipidemia      Essential hypertension      Morbid obesity (H)      Sleep-disordered breathing      Type 2 diabetes mellitus (H)      Past Surgical History:   Procedure Laterality Date     BIOPSY OF SKIN LESION       MOHS MICROGRAPHIC PROCEDURE       PICC INSERTION Right 09/24/2017    5fr TL Bard PICC, 51cm (1cm external) in the R medial brachial vein w/ tip in the SVC.     Family History   Problem Relation Age of Onset     Depression Mother      Glaucoma Maternal Grandfather      Cancer No family hx of      No family history of skin cancer     Macular Degeneration No family hx of      Social History   Substance Use Topics     Smoking status: Never Smoker     Smokeless tobacco: Never Used     Alcohol use No      Comment: Former alcohol abuse. Quit 70s then quit later in 80s-present     No Known Allergies  Current Outpatient Rx   Medication Sig Dispense Refill      ammonium lactate (LAC-HYDRIN) 12 % cream Apply topically 2 times daily as needed for dry skin 385 g 1     Ascorbic Acid (VITAMIN C PO)        aspirin 81 MG tablet Take 1 tablet (81 mg) by mouth daily 30 tablet 0     atorvastatin (LIPITOR) 40 MG tablet Take 1 tablet (40 mg) by mouth daily 30 tablet 11     blood glucose monitoring (ONE TOUCH DELICA) lancets Use to test blood sugars 2 times daily or as directed. 100 each 11     blood glucose monitoring (ONE TOUCH ULTRA MINI) meter device kit Use to test blood sugars 2 times daily or as directed. 1 kit 0     blood glucose monitoring (ONETOUCH ULTRA) test strip Use to test blood sugars 2 times daily or as directed. 100 strip 11     levothyroxine (SYNTHROID/LEVOTHROID) 175 MCG tablet Take 1 tablet (175 mcg) by mouth daily 30 tablet 11     lisinopril (PRINIVIL/ZESTRIL) 5 MG tablet Take 1 tablet (5 mg) by mouth daily Take in Pm 60 tablet 3     metFORMIN (GLUCOPHAGE) 500 MG tablet Take 1 tablet (500 mg) by mouth 2 times daily (with meals) 60 tablet 11     metoprolol (TOPROL-XL) 100 MG 24 hr tablet Take 1 tablet (100 mg) by mouth daily 30 tablet 11     multivitamin, therapeutic with minerals (MULTI-VITAMIN) TABS tablet Take 1 tablet by mouth daily 30 each 11     nystatin (MYCOSTATIN) cream   1     omega 3 1000 MG CAPS Take 1 g by mouth daily 30 capsule 11     order for DME Equipment being ordered: Bariatric Lift chair  Diagnosis - morbid obesity, CHF, Lymphedema    Fax to Ne from [employer] at [phone]. 1 Units 0     order for DME Equipment being ordered: Other: Velcro Compression stockings.   Treatment Diagnosis: bilateral lymphedema. 2 each 0     polyethylene glycol (MIRALAX/GLYCOLAX) Packet Take 17 g by mouth daily as needed for constipation 15 packet 11     potassium chloride SA (K-DUR/KLOR-CON M) 20 MEQ CR tablet Take 1 tablet (20 mEq) by mouth 2 times daily 90 tablet 11     senna-docusate (SENOKOT-S;PERICOLACE) 8.6-50 MG per tablet Take 1-2 tablets by mouth 2  times daily as needed for constipation 60 tablet 11     spironolactone (ALDACTONE) 25 MG tablet Take 1 tablet (25 mg) by mouth 2 times daily 120 tablet 3     tamsulosin (FLOMAX) 0.4 MG capsule Take 1 capsule (0.4 mg) by mouth daily 30 capsule 11     torsemide (DEMADEX) 100 MG tablet Take 0.5 tablets (50 mg) by mouth 2 times daily 60 tablet 3     vitamin B complex with vitamin C (VITAMIN  B COMPLEX) TABS tablet Take 1 tablet by mouth daily       VITAMIN E COMPLEX PO        warfarin (COUMADIN) 5 MG tablet Take 2.5 tablets (12.5 mg) by mouth daily 75 tablet 11       Objective:   There were no vitals taken for this visit.    General: Patient is well groomed, appears stated age, is alert, cooperative and in no acute distress.  Vascular: DP and PT pulses are non-palpable bilaterally. LE capillary refill time is <3 seconds. Absent pedal hair. Moderate non-pitting LE edema.  Skin: LE skin color is normal. Toenails are thickened and discolored bilaterally. L hallux nail is elongated and ingrown into both nail borders. Upon debridement, purulent drainage is present to medial corner. All drainage was evacuated. Small underlying wound. Debris present in both borders as well. Skin extremely dry. Hyperkeratotic tissue build up noted to distal L 2nd digit, upon debridement, wound is present (see below). L 2nd digit nail is elongated, thickened and discolored.     A diabetic wound is noted at left  distal 2nd digit measuring 0.2 cm x 0.2 cm x 0.1 cm. Non-tender to palpation.  Tissue Depth: subcutaneous  Wound base: Pink  Yellow/Granulation  Slough  Edges: hyperkeratotic  Drainage: light/serosanguinous  Odor: No   Undermining: No  Tunneling: No  Bone Exposure: No  Clinical Signs of Infection: No    Previous Laboratory Studies:   Lab Results   Component Value Date    A1C 6.3 03/27/2018    A1C 6.3 11/27/2017    A1C 6.5 08/14/2017    A1C 6.4 09/14/2016    A1C 6.5 08/01/2014    A1C 6.3 06/07/2013    A1C 6.7 03/13/2013    A1C 6.3  12/02/2011    A1C 5.6 02/28/2008       Assessment:   - Diabetic L 2nd digit ulcer  - Onychomycosis  - Ingrown toenail  - DM type 2, controlled    Plan:   - Pt seen and evaluated. Diagnosis and treatment options were discussed.   - Wound was debrided today. After obtaining patient consent, a #15 blade was used to excisionally debride the devitalized tissue of the wound. The wound edges and base were debrided to healthy, bleeding tissue into subcutaneous tissue at the deepest. No anesthesia was necessary for the procedure.   - Nails debrided x 2.   - Clean wounds daily with microklens, pat dry. Apply bacitracin to L great toe. Apply Iodosorb to wound of L 2nd toe. Cover with bandages.   - Moisturize LEs daily  - RTC 1 week        Again, thank you for allowing me to participate in the care of your patient.      Sincerely,    Paula Emery PA-C

## 2018-08-08 NOTE — MR AVS SNAPSHOT
After Visit Summary   8/8/2018    Clarke Moreira    MRN: [id number]           Patient Information     Date Of Birth          1950        Visit Information        Provider Department      8/8/2018 11:40 AM Paula Emery PA-[facility]        Today's Diagnoses     Diabetic ulcer of toe of left foot associated with type 2 diabetes mellitus, with fat layer exposed (H)    -  1    Dermatophytosis of nail        Ingrown toenail        Type 2 diabetes mellitus with hyperglycemia, without long-term current use of insulin (H)           Follow-ups after your visit        Your next 10 appointments already scheduled     Aug 15, 2018 10:40 AM CDT   (Arrive by 10:25 AM)   RETURN FOOT/ANKLE with Paula Emery PA-C   [facility] ([facility])    [address] [place] [address]  4th [facility] [id number] [phone]            Aug 17, 2018 11:00 AM CDT   Return Visit with Matthias Sanchez MD   [facility] ([facility])    [address] [place] [address],  [address]  [facility] [address]   [phone]            Aug 23, 2018 10:20 AM CDT   Return Visit with Lety Buenrostro PsyD   [facility] ([facility])    [address] [place] [address],  [address]  [facility] [address]   [phone]            Sep 07, 2018 11:20 AM CDT   (Arrive by 11:05 AM)   RETURN FOOT/ANKLE with Jesús Lagos DPM   [facility] ([facility])    [address]  4th Floor  [facility] [address] [phone]            Oct 30, 2018  9:00 AM CDT   (Arrive by 8:45 AM)   RETURN HEART FAILURE with Christian Willoughby MD   [facility] ([facility])    [address] [place] [address]  [address] [place] [id number] [phone]              Who to contact     Please call your clinic at [phone] to:    Ask questions about your health    Make or cancel  appointments    Discuss your medicines    Learn about your test results    Speak to your doctor            Additional Information About Your Visit        Care EveryWhere ID     This is your Care EveryWhere ID. This could be used by other organizations to access your [facility] medical records  [id number]         Blood Pressure from Last 3 Encounters:   07/18/18 98/61   06/18/18 125/72   06/15/18 97/59    Weight from Last 3 Encounters:   07/18/18 (!) 170.1 kg (375 lb)   06/18/18 (!) 170.4 kg (375 lb 9.6 oz)   06/15/18 (!) 171.5 kg (378 lb 1.6 oz)              We Performed the Following     DEBRIDEMENT OF NAIL(S), 1-5     DEBRIDEMENT WOUND UP TO 20 SQ CM        Primary Care Provider Office Phone # Fax #    Matthias Sanchez [phone]       [address] [address]        Equal Access to Services     [facility] : Hadii aad ku hadasho Sogalinaali, waaxda luqadaha, qaybta kaalmada adeegyada, waxay hazelin winstonn camryn casarez . So [facility] [phone].    ATENCIÓN: Si habla español, tiene a kim disposición servicios gratuitos de asistencia lingüística. Alexis al [phone].    We comply with applicable federal civil rights laws and [place] laws. We do not discriminate on the basis of race, color, national origin, age, disability, sex, sexual orientation, or gender identity.            Thank you!     Thank you for choosing [facility]  for your care. Our goal is always to provide you with excellent care. Hearing back from our patients is one way we can continue to improve our services. Please take a few minutes to complete the written survey that you may receive in the mail after your visit with us. Thank you!             Your Updated Medication List - Protect others around you: Learn how to safely use, store and throw away your medicines at www.disposemymeds.org.          This list is accurate as of 8/8/18 11:59 PM.  Always use your most recent med list.                    Brand Name Dispense Instructions for use Diagnosis    ammonium lactate 12 % cream    LAC-HYDRIN    385 g    Apply topically 2 times daily as needed for dry skin    Dry skin, Lichen of skin       aspirin 81 MG tablet     30 tablet    Take 1 tablet (81 mg) by mouth daily    Essential hypertension       atorvastatin 40 MG tablet    LIPITOR    30 tablet    Take 1 tablet (40 mg) by mouth daily    Type 2 diabetes mellitus without complication, without long-term current use of insulin (H)       blood glucose monitoring lancets     100 each    Use to test blood sugars 2 times daily or as directed.    Diabetes mellitus, type 2 (H)       blood glucose monitoring meter device kit     1 kit    Use to test blood sugars 2 times daily or as directed.    Type 2 diabetes mellitus with hyperglycemia, without long-term current use of insulin (H)       blood glucose monitoring test strip    ONETOUCH ULTRA    100 strip    Use to test blood sugars 2 times daily or as directed.    Diabetes mellitus, type 2 (H)       levothyroxine 175 MCG tablet    SYNTHROID/LEVOTHROID    30 tablet    Take 1 tablet (175 mcg) by mouth daily    Hypothyroidism, unspecified type       lisinopril 5 MG tablet    PRINIVIL/ZESTRIL    60 tablet    Take 1 tablet (5 mg) by mouth daily Take in Pm    Chronic systolic congestive heart failure (H)       metFORMIN 500 MG tablet    GLUCOPHAGE    60 tablet    Take 1 tablet (500 mg) by mouth 2 times daily (with meals)    Type 2 diabetes mellitus without complication, without long-term current use of insulin (H)       metoprolol succinate 100 MG 24 hr tablet    TOPROL-XL    30 tablet    Take 1 tablet (100 mg) by mouth daily    Essential hypertension with goal blood pressure less than 140/90       multivitamin, therapeutic with minerals Tabs tablet     30 each    Take 1 tablet by mouth daily    Type 2 diabetes mellitus without complication, without long-term current use of insulin (H)       nystatin cream     MYCOSTATIN          omega 3 1000 MG Caps     30 capsule    Take 1 g by mouth daily    Hyperlipidemia LDL goal <100       * order for DME     2 each    Equipment being ordered: Other: Velcro Compression stockings.  Treatment Diagnosis: bilateral lymphedema.    Lymphedema of extremity       * order for DME     1 Units    Equipment being ordered: Bariatric Lift chair Diagnosis - morbid obesity, CHF, Lymphedema  Fax to Ne from [employer] at [phone].    Chronic systolic congestive heart failure (H), Lymphedema, Morbid obesity (H)       polyethylene glycol Packet    MIRALAX/GLYCOLAX    15 packet    Take 17 g by mouth daily as needed for constipation    Constipation, unspecified constipation type       potassium chloride SA 20 MEQ CR tablet    K-DUR/KLOR-CON M    90 tablet    Take 1 tablet (20 mEq) by mouth 2 times daily    Hypokalemia       senna-docusate 8.6-50 MG per tablet    SENOKOT-S;PERICOLACE    60 tablet    Take 1-2 tablets by mouth 2 times daily as needed for constipation    Constipation, unspecified constipation type       spironolactone 25 MG tablet    ALDACTONE    120 tablet    Take 1 tablet (25 mg) by mouth 2 times daily    Chronic systolic congestive heart failure (H)       tamsulosin 0.4 MG capsule    FLOMAX    30 capsule    Take 1 capsule (0.4 mg) by mouth daily    Urinary retention       torsemide 100 MG tablet    DEMADEX    60 tablet    Take 0.5 tablets (50 mg) by mouth 2 times daily    (HFpEF) heart failure with preserved ejection fraction (H)       vitamin B complex with vitamin C Tabs tablet      Take 1 tablet by mouth daily        VITAMIN C PO           VITAMIN E COMPLEX PO           warfarin 5 MG tablet    COUMADIN    75 tablet    Take 2.5 tablets (12.5 mg) by mouth daily    Persistent atrial fibrillation (H)       * Notice:  This list has 2 medication(s) that are the same as other medications prescribed for you. Read the directions carefully, and ask your doctor or other care provider to  review them with you.

## 2018-08-08 NOTE — PROGRESS NOTES
Chief Complaint:   Chief Complaint   Patient presents with     Consult     Wound, left 2nd toe. Type 2 Diabetic.        Subjective: Clarke is a 68 year old male who presents to the clinic today for evaluation of left second digit wound and ingrown toenail. Since last evaluated by Dr. Lagos, care providers have noticed a wound form on the distal second digit. He believes it is from the nail pressure or his new bariatric shoes. Also admits pain to the left great toe and believes the nail is ingrown. Has not noticed drainage and denies redness or swelling to the toe. Mild pain to the L second digit.    Past Medical History:   Diagnosis Date     Atrial fibrillation (H)      Basal cell carcinoma      Chronic anticoagulation      Diastolic heart failure (H)      Dyslipidemia      Essential hypertension      Morbid obesity (H)      Sleep-disordered breathing      Type 2 diabetes mellitus (H)      Past Surgical History:   Procedure Laterality Date     BIOPSY OF SKIN LESION       MOHS MICROGRAPHIC PROCEDURE       PICC INSERTION Right 09/24/2017    5fr TL Bard PICC, 51cm (1cm external) in the R medial brachial vein w/ tip in the SVC.     Family History   Problem Relation Age of Onset     Depression Mother      Glaucoma Maternal Grandfather      Cancer No family hx of      No family history of skin cancer     Macular Degeneration No family hx of      Social History   Substance Use Topics     Smoking status: Never Smoker     Smokeless tobacco: Never Used     Alcohol use No      Comment: Former alcohol abuse. Quit 70s then quit later in 80s-present     No Known Allergies  Current Outpatient Rx   Medication Sig Dispense Refill     ammonium lactate (LAC-HYDRIN) 12 % cream Apply topically 2 times daily as needed for dry skin 385 g 1     Ascorbic Acid (VITAMIN C PO)        aspirin 81 MG tablet Take 1 tablet (81 mg) by mouth daily 30 tablet 0     atorvastatin (LIPITOR) 40 MG tablet Take 1 tablet (40 mg) by mouth daily 30 tablet  11     blood glucose monitoring (ONE TOUCH DELICA) lancets Use to test blood sugars 2 times daily or as directed. 100 each 11     blood glucose monitoring (ONE TOUCH ULTRA MINI) meter device kit Use to test blood sugars 2 times daily or as directed. 1 kit 0     blood glucose monitoring (ONETOUCH ULTRA) test strip Use to test blood sugars 2 times daily or as directed. 100 strip 11     levothyroxine (SYNTHROID/LEVOTHROID) 175 MCG tablet Take 1 tablet (175 mcg) by mouth daily 30 tablet 11     lisinopril (PRINIVIL/ZESTRIL) 5 MG tablet Take 1 tablet (5 mg) by mouth daily Take in Pm 60 tablet 3     metFORMIN (GLUCOPHAGE) 500 MG tablet Take 1 tablet (500 mg) by mouth 2 times daily (with meals) 60 tablet 11     metoprolol (TOPROL-XL) 100 MG 24 hr tablet Take 1 tablet (100 mg) by mouth daily 30 tablet 11     multivitamin, therapeutic with minerals (MULTI-VITAMIN) TABS tablet Take 1 tablet by mouth daily 30 each 11     nystatin (MYCOSTATIN) cream   1     omega 3 1000 MG CAPS Take 1 g by mouth daily 30 capsule 11     order for DME Equipment being ordered: Bariatric Lift chair  Diagnosis - morbid obesity, CHF, Lymphedema    Fax to Ne from [employer] at [phone]. 1 Units 0     order for DME Equipment being ordered: Other: Velcro Compression stockings.   Treatment Diagnosis: bilateral lymphedema. 2 each 0     polyethylene glycol (MIRALAX/GLYCOLAX) Packet Take 17 g by mouth daily as needed for constipation 15 packet 11     potassium chloride SA (K-DUR/KLOR-CON M) 20 MEQ CR tablet Take 1 tablet (20 mEq) by mouth 2 times daily 90 tablet 11     senna-docusate (SENOKOT-S;PERICOLACE) 8.6-50 MG per tablet Take 1-2 tablets by mouth 2 times daily as needed for constipation 60 tablet 11     spironolactone (ALDACTONE) 25 MG tablet Take 1 tablet (25 mg) by mouth 2 times daily 120 tablet 3     tamsulosin (FLOMAX) 0.4 MG capsule Take 1 capsule (0.4 mg) by mouth daily 30 capsule 11     torsemide (DEMADEX) 100 MG tablet Take 0.5 tablets  (50 mg) by mouth 2 times daily 60 tablet 3     vitamin B complex with vitamin C (VITAMIN  B COMPLEX) TABS tablet Take 1 tablet by mouth daily       VITAMIN E COMPLEX PO        warfarin (COUMADIN) 5 MG tablet Take 2.5 tablets (12.5 mg) by mouth daily 75 tablet 11       Objective:   There were no vitals taken for this visit.    General: Patient is well groomed, appears stated age, is alert, cooperative and in no acute distress.  Vascular: DP and PT pulses are non-palpable bilaterally. LE capillary refill time is <3 seconds. Absent pedal hair. Moderate non-pitting LE edema.  Skin: LE skin color is normal. Toenails are thickened and discolored bilaterally. L hallux nail is elongated and ingrown into both nail borders. Upon debridement, purulent drainage is present to medial corner. All drainage was evacuated. Small underlying wound. Debris present in both borders as well. Skin extremely dry. Hyperkeratotic tissue build up noted to distal L 2nd digit, upon debridement, wound is present (see below). L 2nd digit nail is elongated, thickened and discolored.     A diabetic wound is noted at left  distal 2nd digit measuring 0.2 cm x 0.2 cm x 0.1 cm. Non-tender to palpation.  Tissue Depth: subcutaneous  Wound base: Pink  Yellow/Granulation  Slough  Edges: hyperkeratotic  Drainage: light/serosanguinous  Odor: No   Undermining: No  Tunneling: No  Bone Exposure: No  Clinical Signs of Infection: No    Previous Laboratory Studies:   Lab Results   Component Value Date    A1C 6.3 03/27/2018    A1C 6.3 11/27/2017    A1C 6.5 08/14/2017    A1C 6.4 09/14/2016    A1C 6.5 08/01/2014    A1C 6.3 06/07/2013    A1C 6.7 03/13/2013    A1C 6.3 12/02/2011    A1C 5.6 02/28/2008       Assessment:   - Diabetic L 2nd digit ulcer  - Onychomycosis  - Ingrown toenail  - DM type 2, controlled    Plan:   - Pt seen and evaluated. Diagnosis and treatment options were discussed.   - Wound was debrided today. After obtaining patient consent, a #15 blade was  used to excisionally debride the devitalized tissue of the wound. The wound edges and base were debrided to healthy, bleeding tissue into subcutaneous tissue at the deepest. No anesthesia was necessary for the procedure.   - Nails debrided x 2.   - Clean wounds daily with microklens, pat dry. Apply bacitracin to L great toe. Apply Iodosorb to wound of L 2nd toe. Cover with bandages.   - Moisturize LEs daily  - RTC 1 week

## 2018-08-15 ENCOUNTER — OFFICE VISIT (OUTPATIENT)
Dept: ORTHOPEDICS | Facility: CLINIC | Age: 68
End: 2018-08-15
Payer: COMMERCIAL

## 2018-08-15 DIAGNOSIS — L60.0 INGROWN TOENAIL: ICD-10-CM

## 2018-08-15 DIAGNOSIS — L97.521 DIABETIC ULCER OF TOE OF LEFT FOOT ASSOCIATED WITH TYPE 2 DIABETES MELLITUS, LIMITED TO BREAKDOWN OF SKIN (H): Primary | ICD-10-CM

## 2018-08-15 DIAGNOSIS — E11.621 DIABETIC ULCER OF TOE OF LEFT FOOT ASSOCIATED WITH TYPE 2 DIABETES MELLITUS, LIMITED TO BREAKDOWN OF SKIN (H): Primary | ICD-10-CM

## 2018-08-15 NOTE — NURSING NOTE
Reason For Visit:   Chief Complaint   Patient presents with     RECHECK     Wound, left 2nd toe. Ingrown, Left 1st toe. Pt stated that the corner of his left 1st toe is mildy tender.       Pain Assessment  Patient Currently in Pain: Denies          Current Outpatient Prescriptions   Medication Sig Dispense Refill     ammonium lactate (LAC-HYDRIN) 12 % cream Apply topically 2 times daily as needed for dry skin 385 g 1     Ascorbic Acid (VITAMIN C PO)        aspirin 81 MG tablet Take 1 tablet (81 mg) by mouth daily 30 tablet 0     atorvastatin (LIPITOR) 40 MG tablet Take 1 tablet (40 mg) by mouth daily 30 tablet 11     blood glucose monitoring (ONE TOUCH DELICA) lancets Use to test blood sugars 2 times daily or as directed. 100 each 11     blood glucose monitoring (ONE TOUCH ULTRA MINI) meter device kit Use to test blood sugars 2 times daily or as directed. 1 kit 0     blood glucose monitoring (ONETOUCH ULTRA) test strip Use to test blood sugars 2 times daily or as directed. 100 strip 11     levothyroxine (SYNTHROID/LEVOTHROID) 175 MCG tablet Take 1 tablet (175 mcg) by mouth daily 30 tablet 11     lisinopril (PRINIVIL/ZESTRIL) 5 MG tablet Take 1 tablet (5 mg) by mouth daily Take in Pm 60 tablet 3     metFORMIN (GLUCOPHAGE) 500 MG tablet Take 1 tablet (500 mg) by mouth 2 times daily (with meals) 60 tablet 11     metoprolol (TOPROL-XL) 100 MG 24 hr tablet Take 1 tablet (100 mg) by mouth daily 30 tablet 11     multivitamin, therapeutic with minerals (MULTI-VITAMIN) TABS tablet Take 1 tablet by mouth daily 30 each 11     nystatin (MYCOSTATIN) cream   1     omega 3 1000 MG CAPS Take 1 g by mouth daily 30 capsule 11     order for DME Equipment being ordered: Bariatric Lift chair  Diagnosis - morbid obesity, CHF, Lymphedema    Fax to Ne from [employer] at [phone]. 1 Units 0     order for DME Equipment being ordered: Other: Velcro Compression stockings.   Treatment Diagnosis: bilateral lymphedema. 2 each 0      polyethylene glycol (MIRALAX/GLYCOLAX) Packet Take 17 g by mouth daily as needed for constipation 15 packet 11     potassium chloride SA (K-DUR/KLOR-CON M) 20 MEQ CR tablet Take 1 tablet (20 mEq) by mouth 2 times daily 90 tablet 11     senna-docusate (SENOKOT-S;PERICOLACE) 8.6-50 MG per tablet Take 1-2 tablets by mouth 2 times daily as needed for constipation 60 tablet 11     spironolactone (ALDACTONE) 25 MG tablet Take 1 tablet (25 mg) by mouth 2 times daily 120 tablet 3     tamsulosin (FLOMAX) 0.4 MG capsule Take 1 capsule (0.4 mg) by mouth daily 30 capsule 11     torsemide (DEMADEX) 100 MG tablet Take 0.5 tablets (50 mg) by mouth 2 times daily 60 tablet 3     vitamin B complex with vitamin C (VITAMIN  B COMPLEX) TABS tablet Take 1 tablet by mouth daily       VITAMIN E COMPLEX PO        warfarin (COUMADIN) 5 MG tablet Take 2.5 tablets (12.5 mg) by mouth daily 75 tablet 11        No Known Allergies

## 2018-08-15 NOTE — PROGRESS NOTES
Chief Complaint:   Chief Complaint   Patient presents with     RECHECK     Wound, left 2nd toe. Ingrown, Left 1st toe. Pt stated that the corner of his left 1st toe is mildy tender.       Subjective: Clarke is a 68 year old male who presents to the clinic today for follow up of left second digit wound. Believes the wound has improved, much less drainage and redness has decreased. Still having some pain to medial hallux. Has not noticed purulent drainage. No new wounds or pains.    No Known Allergies  Current Outpatient Rx   Medication Sig Dispense Refill     ammonium lactate (LAC-HYDRIN) 12 % cream Apply topically 2 times daily as needed for dry skin 385 g 1     Ascorbic Acid (VITAMIN C PO)        aspirin 81 MG tablet Take 1 tablet (81 mg) by mouth daily 30 tablet 0     atorvastatin (LIPITOR) 40 MG tablet Take 1 tablet (40 mg) by mouth daily 30 tablet 11     blood glucose monitoring (ONE TOUCH DELICA) lancets Use to test blood sugars 2 times daily or as directed. 100 each 11     blood glucose monitoring (ONE TOUCH ULTRA MINI) meter device kit Use to test blood sugars 2 times daily or as directed. 1 kit 0     blood glucose monitoring (ONETOUCH ULTRA) test strip Use to test blood sugars 2 times daily or as directed. 100 strip 11     levothyroxine (SYNTHROID/LEVOTHROID) 175 MCG tablet Take 1 tablet (175 mcg) by mouth daily 30 tablet 11     lisinopril (PRINIVIL/ZESTRIL) 5 MG tablet Take 1 tablet (5 mg) by mouth daily Take in Pm 60 tablet 3     metFORMIN (GLUCOPHAGE) 500 MG tablet Take 1 tablet (500 mg) by mouth 2 times daily (with meals) 60 tablet 11     metoprolol (TOPROL-XL) 100 MG 24 hr tablet Take 1 tablet (100 mg) by mouth daily 30 tablet 11     multivitamin, therapeutic with minerals (MULTI-VITAMIN) TABS tablet Take 1 tablet by mouth daily 30 each 11     nystatin (MYCOSTATIN) cream   1     omega 3 1000 MG CAPS Take 1 g by mouth daily 30 capsule 11     order for DME Equipment being ordered: Bariatric Lift  chair  Diagnosis - morbid obesity, CHF, Lymphedema    Fax to Ne from [employer] at [phone]. 1 Units 0     order for DME Equipment being ordered: Other: Velcro Compression stockings.   Treatment Diagnosis: bilateral lymphedema. 2 each 0     polyethylene glycol (MIRALAX/GLYCOLAX) Packet Take 17 g by mouth daily as needed for constipation 15 packet 11     potassium chloride SA (K-DUR/KLOR-CON M) 20 MEQ CR tablet Take 1 tablet (20 mEq) by mouth 2 times daily 90 tablet 11     senna-docusate (SENOKOT-S;PERICOLACE) 8.6-50 MG per tablet Take 1-2 tablets by mouth 2 times daily as needed for constipation 60 tablet 11     spironolactone (ALDACTONE) 25 MG tablet Take 1 tablet (25 mg) by mouth 2 times daily 120 tablet 3     tamsulosin (FLOMAX) 0.4 MG capsule Take 1 capsule (0.4 mg) by mouth daily 30 capsule 11     torsemide (DEMADEX) 100 MG tablet Take 0.5 tablets (50 mg) by mouth 2 times daily 60 tablet 3     vitamin B complex with vitamin C (VITAMIN  B COMPLEX) TABS tablet Take 1 tablet by mouth daily       VITAMIN E COMPLEX PO        warfarin (COUMADIN) 5 MG tablet Take 2.5 tablets (12.5 mg) by mouth daily 75 tablet 11       Objective:   There were no vitals taken for this visit.    General: Patient is well groomed, appears stated age, is alert and cooperative, and is in no acute distress.  Skin: left medial hallux nail ingrowing into medial border, spicule removed upon debridement. No purulent drainage. Pain relief immediate upon removal.     A diabetic wound is noted at left  distal 2nd digit measuring 0.2 cm x 0.2 cm x 0.1 cm. Non-tender to palpation.  Tissue Depth: subcutaneous  Wound base: Pink/Granulation  Edges: hyperkeratotic  Drainage: light/serosanguinous  Odor: No   Undermining: No  Tunneling: No  Bone Exposure: No  Clinical Signs of Infection: No    Assessment:   - Diabetic L 2nd toe ulcer  - Ingrown toenail causing pain    Plan:   - Devitalized tissue of wound was excisionally debrided with #15 blade to  level of subcutaneous tissue at the deepest. No bleeding occurred, no anesthesia necessary.  - Slant back performed again to L hallux medial border. Continue to dress with abx ointment and bandaid daily x 1 week.  - Antibiotic ointment and band-aid to 2nd digit wound, changed daily.  - RTC 1 week

## 2018-08-15 NOTE — MR AVS SNAPSHOT
After Visit Summary   8/15/2018    Clarke Moreira    MRN: [id number]           Patient Information     Date Of Birth          1950        Visit Information        Provider Department      8/15/2018 10:40 AM Paula Emery PA-C [facility]        Today's Diagnoses     Diabetic ulcer of toe of left foot associated with type 2 diabetes mellitus, limited to breakdown of skin (H)    -  1    Ingrown toenail           Follow-ups after your visit        Your next 10 appointments already scheduled     Aug 17, 2018 11:00 AM CDT   Return [facility] with [facility] ([facility])    [address],  [address]  [facility] [address]   [phone]            Aug 22, 2018 10:20 AM CDT   (Arrive by 10:05 AM)   RETURN FOOT/ANKLE with Paula Emery PA-C   [facility] ([facility])    [address] [place] [address]  4th [facility] [id number] [phone]            Aug 23, 2018 10:20 AM CDT   Return Visit with [facility] ([facility])    [address],  [address]  [facility] [address]   [phone]            Sep 07, 2018 11:20 AM CDT   (Arrive by 11:05 AM)   RETURN FOOT/ANKLE with Jesús Lagos DPM   [facility] ([facility])    [address] [place] [address]  4th Floor  [facility] [id number] [phone]            Oct 30, 2018  9:00 AM CDT   (Arrive by 8:45 AM)   RETURN HEART FAILURE with Christian Willoughby MD   [facility] ([facility])    [address] [place] [address]  [address] [place] [id number] [phone]              Who to contact     Please call your clinic at [phone] to:    Ask questions about your health    Make or cancel appointments    Discuss your medicines    Learn about your test results    Speak to your doctor            Additional  Information About Your Visit        Care EveryWhere ID     This is your Care EveryWhere ID. This could be used by other organizations to access your [facility] medical records  [id number]         Blood Pressure from Last 3 Encounters:   07/18/18 98/61   06/18/18 125/72   06/15/18 97/59    Weight from Last 3 Encounters:   07/18/18 (!) 170.1 kg (375 lb)   06/18/18 (!) 170.4 kg (375 lb 9.6 oz)   06/15/18 (!) 171.5 kg (378 lb 1.6 oz)              We Performed the Following     DEBRIDEMENT WOUND UP TO 20 SQ CM     TRIM NONDYSTRPHIC NAIL(S)        Primary Care Provider Office Phone # Fax #    Matthias Sanchez [phone]       [address] [address]        Equal Access to Services     TYLER R[place] : Hadii kell andujar hadasho Soomaali, waaxda luqadaha, qaybta kaalmada adeegyada, vasyl casarez . So [facility] [phone].    ATENCIÓN: Si habla español, tiene a kim disposición servicios gratuitos de asistencia lingüística. Alexis al [phone].    We comply with applicable federal civil rights laws and [place] laws. We do not discriminate on the basis of race, color, national origin, age, disability, sex, sexual orientation, or gender identity.            Thank you!     Thank you for choosing [facility]  for your care. Our goal is always to provide you with excellent care. Hearing back from our patients is one way we can continue to improve our services. Please take a few minutes to complete the written survey that you may receive in the mail after your visit with us. Thank you!             Your Updated Medication List - Protect others around you: Learn how to safely use, store and throw away your medicines at www.disposemymeds.org.          This list is accurate as of 8/15/18 11:59 PM.  Always use your most recent med list.                   Brand Name Dispense Instructions for use Diagnosis    ammonium lactate 12 % cream    LAC-HYDRIN    385 g     Apply topically 2 times daily as needed for dry skin    Dry skin, Lichen of skin       aspirin 81 MG tablet     30 tablet    Take 1 tablet (81 mg) by mouth daily    Essential hypertension       atorvastatin 40 MG tablet    LIPITOR    30 tablet    Take 1 tablet (40 mg) by mouth daily    Type 2 diabetes mellitus without complication, without long-term current use of insulin (H)       blood glucose monitoring lancets     100 each    Use to test blood sugars 2 times daily or as directed.    Diabetes mellitus, type 2 (H)       blood glucose monitoring meter device kit     1 kit    Use to test blood sugars 2 times daily or as directed.    Type 2 diabetes mellitus with hyperglycemia, without long-term current use of insulin (H)       blood glucose monitoring test strip    ONETOUCH ULTRA    100 strip    Use to test blood sugars 2 times daily or as directed.    Diabetes mellitus, type 2 (H)       levothyroxine 175 MCG tablet    SYNTHROID/LEVOTHROID    30 tablet    Take 1 tablet (175 mcg) by mouth daily    Hypothyroidism, unspecified type       lisinopril 5 MG tablet    PRINIVIL/ZESTRIL    60 tablet    Take 1 tablet (5 mg) by mouth daily Take in Pm    Chronic systolic congestive heart failure (H)       metFORMIN 500 MG tablet    GLUCOPHAGE    60 tablet    Take 1 tablet (500 mg) by mouth 2 times daily (with meals)    Type 2 diabetes mellitus without complication, without long-term current use of insulin (H)       metoprolol succinate 100 MG 24 hr tablet    TOPROL-XL    30 tablet    Take 1 tablet (100 mg) by mouth daily    Essential hypertension with goal blood pressure less than 140/90       multivitamin, therapeutic with minerals Tabs tablet     30 each    Take 1 tablet by mouth daily    Type 2 diabetes mellitus without complication, without long-term current use of insulin (H)       nystatin cream    MYCOSTATIN          omega 3 1000 MG Caps     30 capsule    Take 1 g by mouth daily    Hyperlipidemia LDL goal <100       *  order for DME     2 each    Equipment being ordered: Other: Velcro Compression stockings.  Treatment Diagnosis: bilateral lymphedema.    Lymphedema of extremity       * order for DME     1 Units    Equipment being ordered: Bariatric Lift chair Diagnosis - morbid obesity, CHF, Lymphedema  Fax to Ne from [facility] acquired by [employer] at [phone].    Chronic systolic congestive heart failure (H), Lymphedema, Morbid obesity (H)       polyethylene glycol Packet    MIRALAX/GLYCOLAX    15 packet    Take 17 g by mouth daily as needed for constipation    Constipation, unspecified constipation type       potassium chloride SA 20 MEQ CR tablet    K-DUR/KLOR-CON M    90 tablet    Take 1 tablet (20 mEq) by mouth 2 times daily    Hypokalemia       senna-docusate 8.6-50 MG per tablet    SENOKOT-S;PERICOLACE    60 tablet    Take 1-2 tablets by mouth 2 times daily as needed for constipation    Constipation, unspecified constipation type       spironolactone 25 MG tablet    ALDACTONE    120 tablet    Take 1 tablet (25 mg) by mouth 2 times daily    Chronic systolic congestive heart failure (H)       tamsulosin 0.4 MG capsule    FLOMAX    30 capsule    Take 1 capsule (0.4 mg) by mouth daily    Urinary retention       torsemide 100 MG tablet    DEMADEX    60 tablet    Take 0.5 tablets (50 mg) by mouth 2 times daily    (HFpEF) heart failure with preserved ejection fraction (H)       vitamin B complex with vitamin C Tabs tablet      Take 1 tablet by mouth daily        VITAMIN C PO           VITAMIN E COMPLEX PO           warfarin 5 MG tablet    COUMADIN    75 tablet    Take 2.5 tablets (12.5 mg) by mouth daily    Persistent atrial fibrillation (H)       * Notice:  This list has 2 medication(s) that are the same as other medications prescribed for you. Read the directions carefully, and ask your doctor or other care provider to review them with you.

## 2018-08-15 NOTE — LETTER
8/15/2018     RE: Clarke Moreira  [address]  [facility] [address]     Dear Colleague,    Thank you for referring your patient, Clarke Moreira, to the [facility]. Please see a copy of my visit note below.    Chief Complaint:   Chief Complaint   Patient presents with     RECHECK     Wound, left 2nd toe. Ingrown, Left 1st toe. Pt stated that the corner of his left 1st toe is mildy tender.       Subjective: Clarke is a 68 year old male who presents to the clinic today for follow up of left second digit wound. Believes the wound has improved, much less drainage and redness has decreased. Still having some pain to medial hallux. Has not noticed purulent drainage. No new wounds or pains.    No Known Allergies  Current Outpatient Rx   Medication Sig Dispense Refill     ammonium lactate (LAC-HYDRIN) 12 % cream Apply topically 2 times daily as needed for dry skin 385 g 1     Ascorbic Acid (VITAMIN C PO)        aspirin 81 MG tablet Take 1 tablet (81 mg) by mouth daily 30 tablet 0     atorvastatin (LIPITOR) 40 MG tablet Take 1 tablet (40 mg) by mouth daily 30 tablet 11     blood glucose monitoring (ONE TOUCH DELICA) lancets Use to test blood sugars 2 times daily or as directed. 100 each 11     blood glucose monitoring (ONE TOUCH ULTRA MINI) meter device kit Use to test blood sugars 2 times daily or as directed. 1 kit 0     blood glucose monitoring (ONETOUCH ULTRA) test strip Use to test blood sugars 2 times daily or as directed. 100 strip 11     levothyroxine (SYNTHROID/LEVOTHROID) 175 MCG tablet Take 1 tablet (175 mcg) by mouth daily 30 tablet 11     lisinopril (PRINIVIL/ZESTRIL) 5 MG tablet Take 1 tablet (5 mg) by mouth daily Take in Pm 60 tablet 3     metFORMIN (GLUCOPHAGE) 500 MG tablet Take 1 tablet (500 mg) by mouth 2 times daily (with meals) 60 tablet 11     metoprolol (TOPROL-XL) 100 MG 24 hr tablet Take 1 tablet (100 mg) by  mouth daily 30 tablet 11     multivitamin, therapeutic with minerals (MULTI-VITAMIN) TABS tablet Take 1 tablet by mouth daily 30 each 11     nystatin (MYCOSTATIN) cream   1     omega 3 1000 MG CAPS Take 1 g by mouth daily 30 capsule 11     order for DME Equipment being ordered: Bariatric Lift chair  Diagnosis - morbid obesity, CHF, Lymphedema    Fax to Ne from [facility] at [phone]. 1 Units 0     order for DME Equipment being ordered: Other: Velcro Compression stockings.   Treatment Diagnosis: bilateral lymphedema. 2 each 0     polyethylene glycol (MIRALAX/GLYCOLAX) Packet Take 17 g by mouth daily as needed for constipation 15 packet 11     potassium chloride SA (K-DUR/KLOR-CON M) 20 MEQ CR tablet Take 1 tablet (20 mEq) by mouth 2 times daily 90 tablet 11     senna-docusate (SENOKOT-S;PERICOLACE) 8.6-50 MG per tablet Take 1-2 tablets by mouth 2 times daily as needed for constipation 60 tablet 11     spironolactone (ALDACTONE) 25 MG tablet Take 1 tablet (25 mg) by mouth 2 times daily 120 tablet 3     tamsulosin (FLOMAX) 0.4 MG capsule Take 1 capsule (0.4 mg) by mouth daily 30 capsule 11     torsemide (DEMADEX) 100 MG tablet Take 0.5 tablets (50 mg) by mouth 2 times daily 60 tablet 3     vitamin B complex with vitamin C (VITAMIN  B COMPLEX) TABS tablet Take 1 tablet by mouth daily       VITAMIN E COMPLEX PO        warfarin (COUMADIN) 5 MG tablet Take 2.5 tablets (12.5 mg) by mouth daily 75 tablet 11       Objective:   There were no vitals taken for this visit.    General: Patient is well groomed, appears stated age, is alert and cooperative, and is in no acute distress.  Skin: left medial hallux nail ingrowing into medial border, spicule removed upon debridement. No purulent drainage. Pain relief immediate upon removal.     A diabetic wound is noted at left  distal 2nd digit measuring 0.2 cm x 0.2 cm x 0.1 cm. Non-tender to palpation.  Tissue Depth: subcutaneous  Wound base: Pink/Granulation  Edges:  hyperkeratotic  Drainage: light/serosanguinous  Odor: No   Undermining: No  Tunneling: No  Bone Exposure: No  Clinical Signs of Infection: No    Assessment:   - Diabetic L 2nd toe ulcer  - Ingrown toenail causing pain    Plan:   - Devitalized tissue of wound was excisionally debrided with #15 blade to level of subcutaneous tissue at the deepest. No bleeding occurred, no anesthesia necessary.  - Slant back performed again to L hallux medial border. Continue to dress with abx ointment and bandaid daily x 1 week.  - Antibiotic ointment and band-aid to 2nd digit wound, changed daily.  - RTC 1 week     Again, thank you for allowing me to participate in the care of your patient.      Sincerely,    Paula Emery PA-C

## 2018-08-21 ENCOUNTER — TELEPHONE (OUTPATIENT)
Dept: FAMILY MEDICINE | Facility: CLINIC | Age: 68
End: 2018-08-21

## 2018-08-21 NOTE — TELEPHONE ENCOUNTER
RN attempted to reach home care to follow up regarding symptoms. Unable to reach. Left VM with name and call back number.    Carla Gonzales RN

## 2018-08-21 NOTE — TELEPHONE ENCOUNTER
[facility] phone call message-patient reporting a symptom:     Symptom: Extra Fluid    Same Day Visit Offered: Yes, but they wanted to talk with a nurse first and we don't have any available    Additional comments: Patient is carrying some extra fluid per home care nurse. No lower extremity fluid but in his upper body. She states his lungs are clear and BP is 108/72. Patient's weight on 8/10/18 was 374 and his weight today is 374.     OK to leave message on voice mail? Yes    Primary language: English      needed? No    Call taken on August 21, 2018 at 11:48 AM by Arielel Day

## 2018-08-21 NOTE — TELEPHONE ENCOUNTER
RN called home care and left VM with information below. Advised home care to call with any questions. Requested home care visit patient on Friday.     Carla Gonzales RN

## 2018-08-21 NOTE — TELEPHONE ENCOUNTER
[facility] phone call message - order or referral request for patient:     Order or referral being requested: Skilled nursing re-certification:  1 time a week for 9 weeks  3 PRN to access and teach symptom management    Occupational therapy:  Continued as ordered from 8/24-9/30.     Additional Comments:     OK to leave a message on voice mail? Yes    Primary language: English      needed? No    Call taken on August 21, 2018 at 10:38 AM by Amanda Dumont

## 2018-08-21 NOTE — TELEPHONE ENCOUNTER
Nurse: Stanley Ferguson called back to speak with nurse concerning patient. Call her at: [phone]

## 2018-08-21 NOTE — TELEPHONE ENCOUNTER
RN called and spoke with Stanley regarding the patient. See original encounter below.     Stanley reports seeing the patient every week (usually on Friday's). On 8/10/18 patient weighed 364 pounds. ON 8/17/18 patient weight 366 pounds. Today, patient weighed 374 pounds.     No breathing issues and lungs were clear on auscultation. Home care states the lungs are usually the last to indicate an issue. Also reports patient has not had a bowel movement for a few days.     Today home care spoke with patient regarding diet. States patient eats a lot of frozen and canned meals. Per home care, patient was not aware of the sodium.     Patient taking all medication. Currently take Torsemide 50 mg in the AM and 50 mg in the PM. Home care had recommended patient take 50 mg in the AM and 50 mg in the early evening instead.     Patient is not currently having any symptoms. Home care states when patient gets sick it usually comes on quickly; therefore, home care is calling to discuss plan.    Home care wants to know from PCP if patient needs to be seen in clinic, if any dose changes need to be made to Spironolactone or Torsemide, or new medication be added. Home care is willing to see patient in the home on Friday if needed.    Message routed to PCP at high priority to review and advise plan.    Carla Gonzales RN

## 2018-08-22 ENCOUNTER — OFFICE VISIT (OUTPATIENT)
Dept: ORTHOPEDICS | Facility: CLINIC | Age: 68
End: 2018-08-22
Payer: COMMERCIAL

## 2018-08-22 DIAGNOSIS — E11.621 DIABETIC ULCER OF TOE OF LEFT FOOT ASSOCIATED WITH TYPE 2 DIABETES MELLITUS, LIMITED TO BREAKDOWN OF SKIN (H): Primary | ICD-10-CM

## 2018-08-22 DIAGNOSIS — L97.521 DIABETIC ULCER OF TOE OF LEFT FOOT ASSOCIATED WITH TYPE 2 DIABETES MELLITUS, LIMITED TO BREAKDOWN OF SKIN (H): Primary | ICD-10-CM

## 2018-08-22 NOTE — MR AVS SNAPSHOT
After Visit Summary   8/22/2018    Clarke Moreira    MRN: [id number]           Patient Information     Date Of Birth          1950        Visit Information        Provider Department      8/22/2018 10:20 AM [facility]        Today's Diagnoses     Diabetic ulcer of toe of left foot associated with type 2 diabetes mellitus, limited to breakdown of skin (H)    -  1       Follow-ups after your visit        Your next 10 appointments already scheduled     Aug 22, 2018 10:20 AM CDT   (Arrive by 10:05 AM)   RETURN FOOT/ANKLE with Paula Emery PA-C   [facility] ([facility])    [address] [facility]  4th [facility] [id number] [phone]            Aug 23, 2018 10:20 AM CDT   Return Visit with [facility] ([facility])    [address],  [address]  [facility] [address]   [phone]            Aug 28, 2018  2:20 PM CDT   RETURN EXTENDED with MD [facility] ([facility])    [address],  [address]  [facility] [address]   [phone]            Sep 07, 2018 11:20 AM CDT   (Arrive by 11:05 AM)   RETURN FOOT/ANKLE with Jesús Lagos DPM   [facility] ([facility])    [address] [place] [address]  4th [facility] [id number] [phone]            Oct 30, 2018  9:00 AM CDT   (Arrive by 8:45 AM)   RETURN HEART FAILURE with Christian Willoughby MD   [facility] ([facility])    [address] [place] [address]  [address] [place] [id number] [phone]              Who to contact     Please call your clinic at [phone] to:    Ask questions about your health    Make or cancel appointments    Discuss your medicines    Learn about your test results    Speak to your doctor            Additional Information About Your  Visit        Care EveryWhere ID     This is your Care EveryWhere ID. This could be used by other organizations to access your [facility] medical records  [id number]         Blood Pressure from Last 3 Encounters:   07/18/18 98/61   06/18/18 125/72   06/15/18 97/59    Weight from Last 3 Encounters:   07/18/18 (!) 170.1 kg (375 lb)   06/18/18 (!) 170.4 kg (375 lb 9.6 oz)   06/15/18 (!) 171.5 kg (378 lb 1.6 oz)              We Performed the Following     DEBRIDEMENT WOUND UP TO 20 SQ CM          Today's Medication Changes          These changes are accurate as of 8/22/18 10:17 AM.  If you have any questions, ask your nurse or doctor.               These medicines have changed or have updated prescriptions.        Dose/Directions    torsemide 100 MG tablet   Commonly known as:  DEMADEX   This may have changed:    - how much to take  - when to take this   Used for:  (HFpEF) heart failure with preserved ejection fraction (H)        Dose:  50 mg   Take 0.5 tablets (50 mg) by mouth 2 times daily   Quantity:  60 tablet   Refills:  3                Primary Care Provider Office Phone # Fax #    Matthias Sanchez [phone]       [address] [address]        Equal Access to Services     TY[place]ER [place] AH: Felisa tomo Sodillan, waaxda luqadaha, qaybta kaalmada adeegyada, vasyl pa. So [facility] [phone].    ATENCIÓN: Si habla español, tiene a kim disposición servicios gratuitos de asistencia lingüística. Alexis al [phone].    We comply with applicable federal civil rights laws and [place] laws. We do not discriminate on the basis of race, color, national origin, age, disability, sex, sexual orientation, or gender identity.            Thank you!     Thank you for choosing [facility]  for your care. Our goal is always to provide you with excellent care. Hearing back from our patients is one way we can continue to improve our  services. Please take a few minutes to complete the written survey that you may receive in the mail after your visit with us. Thank you!             Your Updated Medication List - Protect others around you: Learn how to safely use, store and throw away your medicines at www.disposemymeds.org.          This list is accurate as of 8/22/18 10:17 AM.  Always use your most recent med list.                   Brand Name Dispense Instructions for use Diagnosis    ammonium lactate 12 % cream    LAC-HYDRIN    385 g    Apply topically 2 times daily as needed for dry skin    Dry skin, Lichen of skin       aspirin 81 MG tablet     30 tablet    Take 1 tablet (81 mg) by mouth daily    Essential hypertension       atorvastatin 40 MG tablet    LIPITOR    30 tablet    Take 1 tablet (40 mg) by mouth daily    Type 2 diabetes mellitus without complication, without long-term current use of insulin (H)       blood glucose monitoring lancets     100 each    Use to test blood sugars 2 times daily or as directed.    Diabetes mellitus, type 2 (H)       blood glucose monitoring meter device kit     1 kit    Use to test blood sugars 2 times daily or as directed.    Type 2 diabetes mellitus with hyperglycemia, without long-term current use of insulin (H)       blood glucose monitoring test strip    ONETOUCH ULTRA    100 strip    Use to test blood sugars 2 times daily or as directed.    Diabetes mellitus, type 2 (H)       levothyroxine 175 MCG tablet    SYNTHROID/LEVOTHROID    30 tablet    Take 1 tablet (175 mcg) by mouth daily    Hypothyroidism, unspecified type       lisinopril 5 MG tablet    PRINIVIL/ZESTRIL    60 tablet    Take 1 tablet (5 mg) by mouth daily Take in Pm    Chronic systolic congestive heart failure (H)       metFORMIN 500 MG tablet    GLUCOPHAGE    60 tablet    Take 1 tablet (500 mg) by mouth 2 times daily (with meals)    Type 2 diabetes mellitus without complication, without long-term current use of insulin (H)        metoprolol succinate 100 MG 24 hr tablet    TOPROL-XL    30 tablet    Take 1 tablet (100 mg) by mouth daily    Essential hypertension with goal blood pressure less than 140/90       multivitamin, therapeutic with minerals Tabs tablet     30 each    Take 1 tablet by mouth daily    Type 2 diabetes mellitus without complication, without long-term current use of insulin (H)       nystatin cream    MYCOSTATIN          omega 3 1000 MG Caps     30 capsule    Take 1 g by mouth daily    Hyperlipidemia LDL goal <100       * order for DME     2 each    Equipment being ordered: Other: Velcro Compression stockings.  Treatment Diagnosis: bilateral lymphedema.    Lymphedema of extremity       * order for DME     1 Units    Equipment being ordered: Bariatric Lift chair Diagnosis - morbid obesity, CHF, Lymphedema  Fax to Ne from [facility] at [phone].    Chronic systolic congestive heart failure (H), Lymphedema, Morbid obesity (H)       polyethylene glycol Packet    MIRALAX/GLYCOLAX    15 packet    Take 17 g by mouth daily as needed for constipation    Constipation, unspecified constipation type       potassium chloride SA 20 MEQ CR tablet    K-DUR/KLOR-CON M    90 tablet    Take 1 tablet (20 mEq) by mouth 2 times daily    Hypokalemia       senna-docusate 8.6-50 MG per tablet    SENOKOT-S;PERICOLACE    60 tablet    Take 1-2 tablets by mouth 2 times daily as needed for constipation    Constipation, unspecified constipation type       spironolactone 25 MG tablet    ALDACTONE    120 tablet    Take 1 tablet (25 mg) by mouth 2 times daily    Chronic systolic congestive heart failure (H)       tamsulosin 0.4 MG capsule    FLOMAX    30 capsule    Take 1 capsule (0.4 mg) by mouth daily    Urinary retention       torsemide 100 MG tablet    DEMADEX    60 tablet    Take 0.5 tablets (50 mg) by mouth 2 times daily    (HFpEF) heart failure with preserved ejection fraction (H)       vitamin B complex with vitamin C Tabs tablet      Take 1  tablet by mouth daily        VITAMIN C PO           VITAMIN E COMPLEX PO           warfarin 5 MG tablet    COUMADIN    75 tablet    Take 2.5 tablets (12.5 mg) by mouth daily    Persistent atrial fibrillation (H)       * Notice:  This list has 2 medication(s) that are the same as other medications prescribed for you. Read the directions carefully, and ask your doctor or other care provider to review them with you.

## 2018-08-22 NOTE — PROGRESS NOTES
Chief Complaint:   Chief Complaint   Patient presents with     Wound Check     Left 2nd toe.        Subjective: Clarke is a 68 year old male who presents to the clinic today for follow up of L 2nd digit ulceration. Keeping it covered with a bandage. His aide keeps an eye on it, says it is healing in well. The ingrown toenail removed at last visit was been bothering him over a year and now is finally pain free. No new concerns.    No Known Allergies  Current Outpatient Rx   Medication Sig Dispense Refill     ammonium lactate (LAC-HYDRIN) 12 % cream Apply topically 2 times daily as needed for dry skin 385 g 1     Ascorbic Acid (VITAMIN C PO)        aspirin 81 MG tablet Take 1 tablet (81 mg) by mouth daily 30 tablet 0     atorvastatin (LIPITOR) 40 MG tablet Take 1 tablet (40 mg) by mouth daily 30 tablet 11     blood glucose monitoring (ONE TOUCH DELICA) lancets Use to test blood sugars 2 times daily or as directed. 100 each 11     blood glucose monitoring (ONE TOUCH ULTRA MINI) meter device kit Use to test blood sugars 2 times daily or as directed. 1 kit 0     blood glucose monitoring (ONETOUCH ULTRA) test strip Use to test blood sugars 2 times daily or as directed. 100 strip 11     levothyroxine (SYNTHROID/LEVOTHROID) 175 MCG tablet Take 1 tablet (175 mcg) by mouth daily 30 tablet 11     lisinopril (PRINIVIL/ZESTRIL) 5 MG tablet Take 1 tablet (5 mg) by mouth daily Take in Pm 60 tablet 3     metFORMIN (GLUCOPHAGE) 500 MG tablet Take 1 tablet (500 mg) by mouth 2 times daily (with meals) 60 tablet 11     metoprolol (TOPROL-XL) 100 MG 24 hr tablet Take 1 tablet (100 mg) by mouth daily 30 tablet 11     multivitamin, therapeutic with minerals (MULTI-VITAMIN) TABS tablet Take 1 tablet by mouth daily 30 each 11     nystatin (MYCOSTATIN) cream   1     omega 3 1000 MG CAPS Take 1 g by mouth daily 30 capsule 11     order for DME Equipment being ordered: Bariatric Lift chair  Diagnosis - morbid obesity, CHF, Lymphedema    Fax to  Ne from [employer] at [phone]. 1 Units 0     order for DME Equipment being ordered: Other: Velcro Compression stockings.   Treatment Diagnosis: bilateral lymphedema. 2 each 0     polyethylene glycol (MIRALAX/GLYCOLAX) Packet Take 17 g by mouth daily as needed for constipation 15 packet 11     potassium chloride SA (K-DUR/KLOR-CON M) 20 MEQ CR tablet Take 1 tablet (20 mEq) by mouth 2 times daily 90 tablet 11     senna-docusate (SENOKOT-S;PERICOLACE) 8.6-50 MG per tablet Take 1-2 tablets by mouth 2 times daily as needed for constipation 60 tablet 11     spironolactone (ALDACTONE) 25 MG tablet Take 1 tablet (25 mg) by mouth 2 times daily 120 tablet 3     tamsulosin (FLOMAX) 0.4 MG capsule Take 1 capsule (0.4 mg) by mouth daily 30 capsule 11     torsemide (DEMADEX) 100 MG tablet Take 0.5 tablets (50 mg) by mouth 2 times daily (Patient taking differently: Take 200 mg by mouth daily ) 60 tablet 3     vitamin B complex with vitamin C (VITAMIN  B COMPLEX) TABS tablet Take 1 tablet by mouth daily       VITAMIN E COMPLEX PO        warfarin (COUMADIN) 5 MG tablet Take 2.5 tablets (12.5 mg) by mouth daily 75 tablet 11       Objective:   There were no vitals taken for this visit.    A diabetic wound is noted at left  distal 2nd digit, pinpoint. Non-tender to palpation.  Tissue Depth: dermis.  Wound base: Pink/Granulation  Edges: hyperkeratotic  Drainage: light/serous  Odor: No   Undermining: No  Tunneling: No  Bone Exposure: No  Clinical Signs of Infection: No     Assessment:   - Diabetic L 2nd toe ulcer     Plan:   - Devitalized tissue of wound was excisionally debrided with #15 blade to level of subcutaneous tissue at the deepest. No bleeding occurred, no anesthesia necessary.  - Keep 2nd digit covered with band-aid until scabbed over.  - RTC on 9/7 as scheduled with Dr. Lagos

## 2018-08-22 NOTE — NURSING NOTE
Reason For Visit:   Chief Complaint   Patient presents with     Wound Check     Left 2nd toe.        Pain Assessment  Patient Currently in Pain: Denies (Pt stated that he had some tenderness in his left 2nd toe that  has subsided. )             Current Outpatient Prescriptions   Medication Sig Dispense Refill     ammonium lactate (LAC-HYDRIN) 12 % cream Apply topically 2 times daily as needed for dry skin 385 g 1     Ascorbic Acid (VITAMIN C PO)        aspirin 81 MG tablet Take 1 tablet (81 mg) by mouth daily 30 tablet 0     atorvastatin (LIPITOR) 40 MG tablet Take 1 tablet (40 mg) by mouth daily 30 tablet 11     blood glucose monitoring (ONE TOUCH DELICA) lancets Use to test blood sugars 2 times daily or as directed. 100 each 11     blood glucose monitoring (ONE TOUCH ULTRA MINI) meter device kit Use to test blood sugars 2 times daily or as directed. 1 kit 0     blood glucose monitoring (ONETOUCH ULTRA) test strip Use to test blood sugars 2 times daily or as directed. 100 strip 11     levothyroxine (SYNTHROID/LEVOTHROID) 175 MCG tablet Take 1 tablet (175 mcg) by mouth daily 30 tablet 11     lisinopril (PRINIVIL/ZESTRIL) 5 MG tablet Take 1 tablet (5 mg) by mouth daily Take in Pm 60 tablet 3     metFORMIN (GLUCOPHAGE) 500 MG tablet Take 1 tablet (500 mg) by mouth 2 times daily (with meals) 60 tablet 11     metoprolol (TOPROL-XL) 100 MG 24 hr tablet Take 1 tablet (100 mg) by mouth daily 30 tablet 11     multivitamin, therapeutic with minerals (MULTI-VITAMIN) TABS tablet Take 1 tablet by mouth daily 30 each 11     nystatin (MYCOSTATIN) cream   1     omega 3 1000 MG CAPS Take 1 g by mouth daily 30 capsule 11     order for DME Equipment being ordered: Bariatric Lift chair  Diagnosis - morbid obesity, CHF, Lymphedema    Fax to Ne from [employer] at [phone]. 1 Units 0     order for DME Equipment being ordered: Other: Velcro Compression stockings.   Treatment Diagnosis: bilateral lymphedema. 2 each 0      polyethylene glycol (MIRALAX/GLYCOLAX) Packet Take 17 g by mouth daily as needed for constipation 15 packet 11     potassium chloride SA (K-DUR/KLOR-CON M) 20 MEQ CR tablet Take 1 tablet (20 mEq) by mouth 2 times daily 90 tablet 11     senna-docusate (SENOKOT-S;PERICOLACE) 8.6-50 MG per tablet Take 1-2 tablets by mouth 2 times daily as needed for constipation 60 tablet 11     spironolactone (ALDACTONE) 25 MG tablet Take 1 tablet (25 mg) by mouth 2 times daily 120 tablet 3     tamsulosin (FLOMAX) 0.4 MG capsule Take 1 capsule (0.4 mg) by mouth daily 30 capsule 11     torsemide (DEMADEX) 100 MG tablet Take 0.5 tablets (50 mg) by mouth 2 times daily (Patient taking differently: Take 200 mg by mouth daily ) 60 tablet 3     vitamin B complex with vitamin C (VITAMIN  B COMPLEX) TABS tablet Take 1 tablet by mouth daily       VITAMIN E COMPLEX PO        warfarin (COUMADIN) 5 MG tablet Take 2.5 tablets (12.5 mg) by mouth daily 75 tablet 11        No Known Allergies

## 2018-08-22 NOTE — LETTER
8/22/2018       RE: Clarke Moreira  [address]  [place] [address]     Dear Colleague,    Thank you for referring your patient, Clarke Moreira, to [facility]. Please see a copy of my visit note below.    Chief Complaint:   Chief Complaint   Patient presents with     Wound Check     Left 2nd toe.        Subjective: Clarke is a 68 year old male who presents to the clinic today for follow up of L 2nd digit ulceration. Keeping it covered with a bandage. His aide keeps an eye on it, says it is healing in well. The ingrown toenail removed at last visit was been bothering him over a year and now is finally pain free. No new concerns.    No Known Allergies  Current Outpatient Rx   Medication Sig Dispense Refill     ammonium lactate (LAC-HYDRIN) 12 % cream Apply topically 2 times daily as needed for dry skin 385 g 1     Ascorbic Acid (VITAMIN C PO)        aspirin 81 MG tablet Take 1 tablet (81 mg) by mouth daily 30 tablet 0     atorvastatin (LIPITOR) 40 MG tablet Take 1 tablet (40 mg) by mouth daily 30 tablet 11     blood glucose monitoring (ONE TOUCH DELICA) lancets Use to test blood sugars 2 times daily or as directed. 100 each 11     blood glucose monitoring (ONE TOUCH ULTRA MINI) meter device kit Use to test blood sugars 2 times daily or as directed. 1 kit 0     blood glucose monitoring (ONETOUCH ULTRA) test strip Use to test blood sugars 2 times daily or as directed. 100 strip 11     levothyroxine (SYNTHROID/LEVOTHROID) 175 MCG tablet Take 1 tablet (175 mcg) by mouth daily 30 tablet 11     lisinopril (PRINIVIL/ZESTRIL) 5 MG tablet Take 1 tablet (5 mg) by mouth daily Take in Pm 60 tablet 3     metFORMIN (GLUCOPHAGE) 500 MG tablet Take 1 tablet (500 mg) by mouth 2 times daily (with meals) 60 tablet 11     metoprolol (TOPROL-XL) 100 MG 24 hr tablet Take 1 tablet (100 mg) by mouth daily 30 tablet 11     multivitamin,  therapeutic with minerals (MULTI-VITAMIN) TABS tablet Take 1 tablet by mouth daily 30 each 11     nystatin (MYCOSTATIN) cream   1     omega 3 1000 MG CAPS Take 1 g by mouth daily 30 capsule 11     order for DME Equipment being ordered: Bariatric Lift chair  Diagnosis - morbid obesity, CHF, Lymphedema    Fax to Ne from [employer] at [phone]. 1 Units 0     order for DME Equipment being ordered: Other: Velcro Compression stockings.   Treatment Diagnosis: bilateral lymphedema. 2 each 0     polyethylene glycol (MIRALAX/GLYCOLAX) Packet Take 17 g by mouth daily as needed for constipation 15 packet 11     potassium chloride SA (K-DUR/KLOR-CON M) 20 MEQ CR tablet Take 1 tablet (20 mEq) by mouth 2 times daily 90 tablet 11     senna-docusate (SENOKOT-S;PERICOLACE) 8.6-50 MG per tablet Take 1-2 tablets by mouth 2 times daily as needed for constipation 60 tablet 11     spironolactone (ALDACTONE) 25 MG tablet Take 1 tablet (25 mg) by mouth 2 times daily 120 tablet 3     tamsulosin (FLOMAX) 0.4 MG capsule Take 1 capsule (0.4 mg) by mouth daily 30 capsule 11     torsemide (DEMADEX) 100 MG tablet Take 0.5 tablets (50 mg) by mouth 2 times daily (Patient taking differently: Take 200 mg by mouth daily ) 60 tablet 3     vitamin B complex with vitamin C (VITAMIN  B COMPLEX) TABS tablet Take 1 tablet by mouth daily       VITAMIN E COMPLEX PO        warfarin (COUMADIN) 5 MG tablet Take 2.5 tablets (12.5 mg) by mouth daily 75 tablet 11       Objective:   There were no vitals taken for this visit.    A diabetic wound is noted at left  distal 2nd digit, pinpoint. Non-tender to palpation.  Tissue Depth: dermis.  Wound base: Pink/Granulation  Edges: hyperkeratotic  Drainage: light/serous  Odor: No   Undermining: No  Tunneling: No  Bone Exposure: No  Clinical Signs of Infection: No     Assessment:   - Diabetic L 2nd toe ulcer     Plan:   - Devitalized tissue of wound was excisionally debrided with #15 blade to level of subcutaneous  tissue at the deepest. No bleeding occurred, no anesthesia necessary.  - Keep 2nd digit covered with band-aid until scabbed over.  - RTC on 9/7 as scheduled with Dr. Lagos      Again, thank you for allowing me to participate in the care of your patient.      Sincerely,    Paula Emery PA-C

## 2018-08-23 ENCOUNTER — OFFICE VISIT (OUTPATIENT)
Dept: PSYCHOLOGY | Facility: CLINIC | Age: 68
End: 2018-08-23
Payer: COMMERCIAL

## 2018-08-23 DIAGNOSIS — F32.89 OTHER DEPRESSION: Primary | ICD-10-CM

## 2018-08-23 ASSESSMENT — ANXIETY QUESTIONNAIRES
3. WORRYING TOO MUCH ABOUT DIFFERENT THINGS: NEARLY EVERY DAY
1. FEELING NERVOUS, ANXIOUS, OR ON EDGE: NEARLY EVERY DAY
6. BECOMING EASILY ANNOYED OR IRRITABLE: NEARLY EVERY DAY
2. NOT BEING ABLE TO STOP OR CONTROL WORRYING: NEARLY EVERY DAY
7. FEELING AFRAID AS IF SOMETHING AWFUL MIGHT HAPPEN: NEARLY EVERY DAY
IF YOU CHECKED OFF ANY PROBLEMS ON THIS QUESTIONNAIRE, HOW DIFFICULT HAVE THESE PROBLEMS MADE IT FOR YOU TO DO YOUR WORK, TAKE CARE OF THINGS AT HOME, OR GET ALONG WITH OTHER PEOPLE: VERY DIFFICULT
GAD7 TOTAL SCORE: 18
4. TROUBLE RELAXING: NEARLY EVERY DAY
5. BEING SO RESTLESS THAT IT IS HARD TO SIT STILL: NOT AT ALL

## 2018-08-23 NOTE — MR AVS SNAPSHOT
After Visit Summary   8/23/2018    Clarke Moreira    MRN: [id number]           Patient Information     Date Of Birth          1950        Visit Information        Provider Department      8/23/2018 10:20 AM Lety Buenrostro PsyD [facility]        Today's Diagnoses     Other depression    -  1       Follow-ups after your visit        Your next 10 appointments already scheduled     Sep 07, 2018 11:20 AM CDT   (Arrive by 11:05 AM)   RETURN FOOT/ANKLE with Jesús Lagos DPM   [facility] ([facility])    [address]  4th [address]  [facility] [id number] [phone]            Sep 11, 2018  9:00 AM CDT   Return Visit with [facility] ([facility])    [address],  [address]  [facility] [address]   [phone]            Oct 30, 2018  9:00 AM CDT   (Arrive by 8:45 AM)   RETURN HEART FAILURE with Christian Willoughby MD   [facility] ([facility])    [address]  [address]  [facility] [id number] [phone]              Who to contact     Please call your clinic at [phone] to:    Ask questions about your health    Make or cancel appointments    Discuss your medicines    Learn about your test results    Speak to your doctor            Additional Information About Your Visit        Care EveryWhere ID     This is your Care EveryWhere ID. This could be used by other organizations to access your [facility] medical records  [id number]         Blood Pressure from Last 3 Encounters:   08/28/18 135/74   07/18/18 98/61   06/18/18 125/72    Weight from Last 3 Encounters:   08/28/18 (!) 370 lb (167.8 kg)   07/18/18 (!) 375 lb (170.1 kg)   06/18/18 (!) 375 lb 9.6 oz (170.4 kg)              Today, you had the following     No orders found for display       Primary Care Provider Office Phone # Fax #    Peter  ASHLI Sanchez [phone]       [address] [address]        Equal Access to Services     TYLRE RICO : Hadii aad ku hadkem Cotton, shelton baobernadineha, myron brandytisha banda, vasyl ahnender pa. So [facility] [phone].    ATENCIÓN: Si habla español, tiene a kim disposición servicios gratuitos de asistencia lingüística. Llame al [phone].    We comply with applicable federal civil rights laws and [place] laws. We do not discriminate on the basis of race, color, national origin, age, disability, sex, sexual orientation, or gender identity.            Thank you!     Thank you for choosing [facility]  for your care. Our goal is always to provide you with excellent care. Hearing back from our patients is one way we can continue to improve our services. Please take a few minutes to complete the written survey that you may receive in the mail after your visit with us. Thank you!             Your Updated Medication List - Protect others around you: Learn how to safely use, store and throw away your medicines at www.disposemymeds.org.          This list is accurate as of 8/23/18 11:59 PM.  Always use your most recent med list.                   Brand Name Dispense Instructions for use Diagnosis    ammonium lactate 12 % cream    LAC-HYDRIN    385 g    Apply topically 2 times daily as needed for dry skin    Dry skin, Lichen of skin       aspirin 81 MG tablet     30 tablet    Take 1 tablet (81 mg) by mouth daily    Essential hypertension       atorvastatin 40 MG tablet    LIPITOR    30 tablet    Take 1 tablet (40 mg) by mouth daily    Type 2 diabetes mellitus without complication, without long-term current use of insulin (H)       blood glucose monitoring lancets     100 each    Use to test blood sugars 2 times daily or as directed.    Diabetes mellitus, type 2 (H)       blood glucose monitoring meter device kit     1 kit    Use to test  blood sugars 2 times daily or as directed.    Type 2 diabetes mellitus with hyperglycemia, without long-term current use of insulin (H)       blood glucose monitoring test strip    ONETOUCH ULTRA    100 strip    Use to test blood sugars 2 times daily or as directed.    Diabetes mellitus, type 2 (H)       levothyroxine 175 MCG tablet    SYNTHROID/LEVOTHROID    30 tablet    Take 1 tablet (175 mcg) by mouth daily    Hypothyroidism, unspecified type       lisinopril 5 MG tablet    PRINIVIL/ZESTRIL    60 tablet    Take 1 tablet (5 mg) by mouth daily Take in Pm    Chronic systolic congestive heart failure (H)       metFORMIN 500 MG tablet    GLUCOPHAGE    60 tablet    Take 1 tablet (500 mg) by mouth 2 times daily (with meals)    Type 2 diabetes mellitus without complication, without long-term current use of insulin (H)       metoprolol succinate 100 MG 24 hr tablet    TOPROL-XL    30 tablet    Take 1 tablet (100 mg) by mouth daily    Essential hypertension with goal blood pressure less than 140/90       multivitamin, therapeutic with minerals Tabs tablet     30 each    Take 1 tablet by mouth daily    Type 2 diabetes mellitus without complication, without long-term current use of insulin (H)       nystatin cream    MYCOSTATIN          omega 3 1000 MG Caps     30 capsule    Take 1 g by mouth daily    Hyperlipidemia LDL goal <100       * order for DME     2 each    Equipment being ordered: Other: Velcro Compression stockings.  Treatment Diagnosis: bilateral lymphedema.    Lymphedema of extremity       * order for DME     1 Units    Equipment being ordered: Bariatric Lift chair Diagnosis - morbid obesity, CHF, Lymphedema  Fax to Ne from [employer] at [phone].    Chronic systolic congestive heart failure (H), Lymphedema, Morbid obesity (H)       polyethylene glycol Packet    MIRALAX/GLYCOLAX    15 packet    Take 17 g by mouth daily as needed for constipation    Constipation, unspecified constipation type        potassium chloride SA 20 MEQ CR tablet    K-DUR/KLOR-CON M    90 tablet    Take 1 tablet (20 mEq) by mouth 2 times daily    Hypokalemia       senna-docusate 8.6-50 MG per tablet    SENOKOT-S;PERICOLACE    60 tablet    Take 1-2 tablets by mouth 2 times daily as needed for constipation    Constipation, unspecified constipation type       tamsulosin 0.4 MG capsule    FLOMAX    30 capsule    Take 1 capsule (0.4 mg) by mouth daily    Urinary retention       vitamin B complex with vitamin C Tabs tablet      Take 1 tablet by mouth daily        VITAMIN C PO           VITAMIN E COMPLEX PO           warfarin 5 MG tablet    COUMADIN    75 tablet    Take 2.5 tablets (12.5 mg) by mouth daily    Persistent atrial fibrillation (H)       * Notice:  This list has 2 medication(s) that are the same as other medications prescribed for you. Read the directions carefully, and ask your doctor or other care provider to review them with you.

## 2018-08-23 NOTE — PROGRESS NOTES
"Behavioral Health Progress Note    Client Legal Name: Clarke Moreira   Client Preferred Name: Clarke   Service Type: Individual  Length of Visit: 50 minutes  Attendees: patient     Identifying Information and Presenting Problem:    The patient is a 68 year old American male who is being seen for problematic symptoms of depression and ongoing adjustment to medical illness.    Treatment Objective(s) Addressed in This Session:  First session today, no treatment objectives established    Progress on / Status of Treatment Objective(s) / Homework:  first session today    PHQ-9 SCORE 11/27/2017 5/21/2018 8/23/2018   Total Score - - -   Total Score 27 18 24       CHRIS-7 SCORE 8/4/2014 11/27/2017 8/23/2018   Total Score 18 - -   Total Score - 21 18     PTSD-PC = 3/4  CAGE AID:  3/4 - said no to having a drink in the AM   WHODAS 2.0 Total Score 8/23/2018   Total Score 51       Topics Discussed/Interventions Provided:  \"I have been dying for the last 5-6 years\"  Last September Clarke reports that he fell at home, was taken to [place] to the ER.  He then lost consciousness and has no memory after being put in the ambulance at his home.  When he woke up, he was in the recovery room.  States he was diagnosed with CHF among other diagnoses.  From the hospital he was transferred to the rehabilitation unit.  He reports that this experience was transformative for him, as he was surrounded by so many people that treated him with respectful nurturance through out his stay.  He states that he has never felt so cared for before in his life. With this experience, he noticed that his mood and perspective started to shift.  His mood improved and he started to feel more hopeful and invested in other people.  Clarke is concerned at this time because he is worried that he is going to lose the gains he has made from this experience.  He is noticing some increasing irritability and anger.  He can take things out on people, even when he " "knows the intentions are positive.  He is worried he is reverting back to old behavior (\"I am reverting back to a cynical depressed old man child.\"   Additionally, he has developed very close relationships with his OT and home nurse manager.  He states he has developed \"intense dependence\" on these two people.  They both see him one time a week and he states \"they are my social life\"  He realizes that as his health has improved that their time with him will come to an end.  He wants to be connected with a therapist to help him with the transition when that time comes.      Clarke describes himself in harsh terms.  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 He has never been able to trust himself or his abilities.  He states as he has come to trust the healthcare providers involved in his care, he started to see that there is possibly a path to trusting himself, although he doesn't know if that is possible for him.  It is a revelation to even see that it is possible though.     Drank heavily until his 40's.  Was in treatment two times.    Doesn't leave his apartment.   Previously worked as a  for 10 years.  Has not worked in some time.     Has been in therapy off and on since age 13.  Attended through the university system when in college.  Had a 8 year relationship with a therapist that ended in 2008.  He states that after therapy relationship ended, he had a difficult time thinking about interviewing anyone else and let it go.  He states she helped him to recover some sense of contentment with being alive.  He states that she moved offices and he felt as if she did not encourage him to move with her.      Assessment: The patient appeared to be active " and engaged in today's session and was receptive to feedback.     Mental Status: Clarke appeared generally alert and oriented. He was slightly disheveled, but appropriately dressed.   Eye contact was good. Speech was of normal volume and rate and was clear, coherent, and relevant. Current mood was not described today.  Affect appeared congruent with stated thought content. Thought processes were relevant and coherent. Thought content was WNL with no evidence of psychotic or paranoid features. PhQ9 Q3 was answered 3 today.  On follow-up he states that he has thoughts that he would be fine if something happened to him and he , but denies any plan or intent to do something to harm himself.  Memory appeared grossly intact. Insight and judgment appeared fair and patient exhibited good impulse control during the appointment.     Does the patient appear to be at imminent risk of harm to self/others at this time? No    The session was necessary to address Clarke's reasons for coming into therapy which include a fear of returning anger and irritability and depression when his care with his home health team is discontinued.  He is worried about reverting back to the level of depression he used to experience.   Ongoing psychotherapy is necessary to stabilize mood, provide counseling, improve functioning with daily activities, provide psychoeducation and provide support.    Diagnosis (DSM-5):  Unspecified depression    Plan:  1. Follow up in 1-2 weeks.  2. Complete diagnostic intake and treatment plan at subsequent visits.       Primary Care PTSD Screen  In your life, have you ever had any experience that was so frightening, horrible or upsetting that, in the past month, you...    1. Have had nightmares about it or thought about it when you did not want to? No  2. Tried hard not to think about it or went out of your way to avoid situations that remind you of it? Yes  3. Were constantly on guard, watchful, or easily  "startled? Yes  4. Felt numb or detached from others, activities, or your surroundings? Yes    Current research suggests that the results of the PC-PTSD should be considered \"positive\" if a patient answers \"yes\" to any (3) items.    References    LAMONT Patel, SONALI Levine, Kimerling, R., RYAN Dudley., MARIANA Garcia., ERICA Mobley., LAMONT Brady, DANIELLE Wu, Jain, J.I. (2004). The primary care PTSD screen (PC-PTSD): development and operating characteristics. Primary Care Psychiatry, 9, 9-14.    "

## 2018-08-24 ENCOUNTER — MEDICAL CORRESPONDENCE (OUTPATIENT)
Dept: HEALTH INFORMATION MANAGEMENT | Facility: CLINIC | Age: 68
End: 2018-08-24

## 2018-08-24 ASSESSMENT — PATIENT HEALTH QUESTIONNAIRE - PHQ9: SUM OF ALL RESPONSES TO PHQ QUESTIONS 1-9: 24

## 2018-08-24 ASSESSMENT — ANXIETY QUESTIONNAIRES: GAD7 TOTAL SCORE: 18

## 2018-08-24 NOTE — TELEPHONE ENCOUNTER
Stanley, home care nurse, calling to provide FYI to Dr Sanchez. Stanley reported patient's weight at 374 pounds. Stanley advised that patient is feeling better and is urinating more; vital signs are within normal range for patient; lungs are clear- slightly dim in the bases, but better than the other day; no crackle.

## 2018-08-28 ENCOUNTER — OFFICE VISIT (OUTPATIENT)
Dept: FAMILY MEDICINE | Facility: CLINIC | Age: 68
End: 2018-08-28
Payer: COMMERCIAL

## 2018-08-28 VITALS
WEIGHT: 315 LBS | TEMPERATURE: 98.8 F | BODY MASS INDEX: 50.18 KG/M2 | DIASTOLIC BLOOD PRESSURE: 74 MMHG | HEART RATE: 72 BPM | SYSTOLIC BLOOD PRESSURE: 135 MMHG | OXYGEN SATURATION: 95 % | RESPIRATION RATE: 16 BRPM

## 2018-08-28 DIAGNOSIS — E78.5 HYPERLIPIDEMIA LDL GOAL <100: ICD-10-CM

## 2018-08-28 DIAGNOSIS — I48.20 CHRONIC ATRIAL FIBRILLATION (H): ICD-10-CM

## 2018-08-28 DIAGNOSIS — I50.30 (HFPEF) HEART FAILURE WITH PRESERVED EJECTION FRACTION (H): Primary | ICD-10-CM

## 2018-08-28 DIAGNOSIS — L97.529 DIABETIC ULCER OF TOE OF LEFT FOOT ASSOCIATED WITH TYPE 2 DIABETES MELLITUS, UNSPECIFIED ULCER STAGE (H): ICD-10-CM

## 2018-08-28 DIAGNOSIS — F41.8 DEPRESSION WITH ANXIETY: ICD-10-CM

## 2018-08-28 DIAGNOSIS — E11.65 TYPE 2 DIABETES MELLITUS WITH HYPERGLYCEMIA, WITHOUT LONG-TERM CURRENT USE OF INSULIN (H): ICD-10-CM

## 2018-08-28 DIAGNOSIS — E11.621 DIABETIC ULCER OF TOE OF LEFT FOOT ASSOCIATED WITH TYPE 2 DIABETES MELLITUS, UNSPECIFIED ULCER STAGE (H): ICD-10-CM

## 2018-08-28 DIAGNOSIS — I50.22 CHRONIC SYSTOLIC CONGESTIVE HEART FAILURE (H): ICD-10-CM

## 2018-08-28 LAB
ALBUMIN SERPL-MCNC: 4.4 MG/DL (ref 3.5–4.7)
ALP SERPL-CCNC: 81.8 U/L (ref 31.7–110.7)
ALT SERPL-CCNC: 18 U/L (ref 0–45)
AST SERPL-CCNC: 18 U/L (ref 0–55)
BILIRUB SERPL-MCNC: 0.7 MG/DL (ref 0.2–1.3)
BUN SERPL-MCNC: 34.5 MG/DL (ref 7–21)
CALCIUM SERPL-MCNC: 9.4 MG/DL (ref 8.5–10.1)
CHLORIDE SERPLBLD-SCNC: 99.7 MMOL/L (ref 98–110)
CHOLEST SERPL-MCNC: 120.3 MG/DL (ref 0–200)
CHOLEST/HDLC SERPL: 2.7 {RATIO} (ref 0–5)
CO2 SERPL-SCNC: 29.4 MMOL/L (ref 20–32)
CREAT SERPL-MCNC: 1.2 MG/DL (ref 0.7–1.3)
GFR SERPL CREATININE-BSD FRML MDRD: 64 ML/MIN/1.7 M2
GLUCOSE SERPL-MCNC: 99.4 MG'DL (ref 70–99)
HBA1C MFR BLD: 5.7 % (ref 4.1–5.7)
HDLC SERPL-MCNC: 44.1 MG/DL
INR PPP: 2.3
LDLC SERPL CALC-MCNC: 55 MG/DL (ref 0–129)
NT-PROBNP SERPL-MCNC: 1231 PG/ML (ref 0–125)
POTASSIUM SERPL-SCNC: 4.3 MMOL/DL (ref 3.3–4.5)
PROT SERPL-MCNC: 7.6 G/DL (ref 6.8–8.8)
SODIUM SERPL-SCNC: 136.9 MMOL/L (ref 132.6–141.4)
TRIGL SERPL-MCNC: 107 MG/DL (ref 0–150)
VLDL CHOLESTEROL: 21.4 MG/DL (ref 7–32)

## 2018-08-28 RX ORDER — SPIRONOLACTONE 25 MG/1
25 TABLET ORAL DAILY
Qty: 30 TABLET | Refills: 3 | Status: ON HOLD | OUTPATIENT
Start: 2018-08-28 | End: 2019-03-16

## 2018-08-28 RX ORDER — TORSEMIDE 100 MG/1
100 TABLET ORAL
Qty: 60 TABLET | Refills: 3 | Status: SHIPPED | OUTPATIENT
Start: 2018-08-28 | End: 2018-12-12

## 2018-08-28 ASSESSMENT — ANXIETY QUESTIONNAIRES
5. BEING SO RESTLESS THAT IT IS HARD TO SIT STILL: NOT AT ALL
7. FEELING AFRAID AS IF SOMETHING AWFUL MIGHT HAPPEN: NEARLY EVERY DAY
2. NOT BEING ABLE TO STOP OR CONTROL WORRYING: NEARLY EVERY DAY
3. WORRYING TOO MUCH ABOUT DIFFERENT THINGS: NEARLY EVERY DAY
IF YOU CHECKED OFF ANY PROBLEMS ON THIS QUESTIONNAIRE, HOW DIFFICULT HAVE THESE PROBLEMS MADE IT FOR YOU TO DO YOUR WORK, TAKE CARE OF THINGS AT HOME, OR GET ALONG WITH OTHER PEOPLE: VERY DIFFICULT
GAD7 TOTAL SCORE: 18
1. FEELING NERVOUS, ANXIOUS, OR ON EDGE: NEARLY EVERY DAY
6. BECOMING EASILY ANNOYED OR IRRITABLE: NEARLY EVERY DAY

## 2018-08-28 ASSESSMENT — PATIENT HEALTH QUESTIONNAIRE - PHQ9: 5. POOR APPETITE OR OVEREATING: NEARLY EVERY DAY

## 2018-08-28 NOTE — PATIENT INSTRUCTIONS
1. CHF  Continue torasemide 100mg twice daily  Reduce spironolactone 25mg once daily    Balancing eliminating fluid and not getting lightheaded      2. Diabetes  A1c= 5.7       3. INR      4. Wt      5. Attitude  !!    PHarper

## 2018-08-28 NOTE — PROGRESS NOTES
HPI       Clarke Moreira is a 68 year old  who presents for   Chief Complaint   Patient presents with     RECHECK     DM - elevated glucose this morning and Hypertension f/u     Refill Request     Torsemide- he has doubled his intake         Concern: Follow up   1.CHF  - improved wt with doubling torsemide  - now with some postural BP sxs    2.Diabetic ulcer  Weekly visits to podiatry for debridement    3.A-fib / anticoagulation  -taking his warfarin 5mg, 2 tablets once a week  - every other day taking 12.5mg once a day    4.DM  -elevated glucose 159 this morning  - 136 few day ago per pt. It is slowly increasing  - stress eating  Lab Results   Component Value Date    A1C 5.7 08/28/2018    A1C 6.3 03/27/2018    A1C 6.3 11/27/2017    A1C 6.5 08/14/2017    A1C 6.4 09/14/2016         5.HTN  - taking BP every few days at home.   -Been seeing low numbers in the 80's and 90's occasionally low 100's  -Dizziness and light headed when he stands up over the last 2 weeks.   - Occurring more frequently than normal  - Taking his Lisinopril 5mg daily    6.Lymphedema  - torso area feels full of water per pt  - doubled his Toresemide to 200mg last week  - wt improved, but abd still feels full of fluid    7.HypoK  Stable      8. Vision  Purple cones  After not eating and then ate too much  Milder than prevoiusly    9. Depression/ anxiety / Eating  Phone nutritionist  Eating for depression  Fear of losing support system  -           Problem, Medication and Allergy Lists were reviewed and updated if needed..  Patient is an established patient of this clinic..         Review of Systems:   Review of Systems   Negative ROS except as noted above in HPI           Physical Exam:     Vitals:    08/28/18 1438   BP: 135/74   Pulse: 72   Resp: 16   Temp: 98.8  F (37.1  C)   TempSrc: Oral   SpO2: 95%   Weight: (!) 370 lb (167.8 kg)     Body mass index is 50.18 kg/(m^2).  Vitals were reviewed and were normal    Wt Readings from Last 5  Encounters:   08/28/18 (!) 370 lb (167.8 kg)   07/18/18 (!) 375 lb (170.1 kg)   06/18/18 (!) 375 lb 9.6 oz (170.4 kg)   06/15/18 (!) 378 lb 1.6 oz (171.5 kg)   06/08/18 (!) 369 lb 14.9 oz (167.8 kg)          Physical Exam   Gen: no distress  Lungs: clear  Cor: irreg, no S3 S4 murmur or rub  Abd: soft, nontender, no HSM  Ext:  lyphedema stockings  Foot- in special shoe              Results:     Results for orders placed or performed in visit on 08/28/18   INR ([facility])   Result Value Ref Range    INR 2.3    Comprehensive Metabolic Panel ([facility])   Result Value Ref Range    Albumin 4.4 3.5 - 4.7 mg/dL    Alkaline Phosphatase 81.8 31.7 - 110.7 U/L    ALT 18.0 0.0 - 45.0 U/L    AST 18.0 0.0 - 55.0 U/L    Bilirubin Total 0.7 0.2 - 1.3 mg/dL    Urea Nitrogen 34.5 (H) 7.0 - 21.0 mg/dL    Calcium 9.4 8.5 - 10.1 mg/dL    Chloride 99.7 98.0 - 110.0 mmol/L    Carbon Dioxide 29.4 20.0 - 32.0 mmol/L    Creatinine 1.2 0.7 - 1.3 mg/dL    Glucose 99.4 (H) 70.0 - 99.0 mg'dL    Potassium 4.3 3.3 - 4.5 mmol/dL    Sodium 136.9 132.6 - 141.4 mmol/L    Protein Total 7.6 6.8 - 8.8 g/dL    GFR Estimate 64.0 >60.0 mL/min/1.7 m2    GFR Estimate If Black 77.4 >60.0 mL/min/1.7 m2   Hemoglobin A1c ([facility])   Result Value Ref Range    Hemoglobin A1C 5.7 4.1 - 5.7 %   Lipid Cascade ([facility])   Result Value Ref Range    Cholesterol 120.3 0.0 - 200.0 mg/dL    Cholesterol/HDL Ratio 2.7 0.0 - 5.0    HDL Cholesterol 44.1 >40.0 mg/dL    LDL Cholesterol Calculated 55 0 - 129 mg/dL    Triglycerides 107.0 0.0 - 150.0 mg/dL    VLDL Cholesterol 21.4 7.0 - 32.0 mg/dL           Assessment and Plan        1. (HFpEF) heart failure with preserved ejection fraction (H)  2. Chronic systolic congestive heart failure (H)  Improved diuresis/wt with doubling torsemide 100mg BID  Having some postural BP sxs  Will continue extra torsemide to reduce fluid in abdomen, but reduce spironolactone to help with postural BP sxs.    - spironolactone (ALDACTONE) 25 MG  tablet; Take 1 tablet (25 mg) by mouth daily  Dispense: 30 tablet; Refill: 3  - torsemide (DEMADEX) 100 MG tablet; Take 1 tablet (100 mg) by mouth 2 times daily  Dispense: 60 tablet; Refill: 3      3. Chronic atrial fibrillation (H)  INR=2.3  - INR ([facility])    4. Type 2 diabetes mellitus with hyperglycemia, without long-term current use of insulin (H)  A1c=5.7  Eating due to anxiety/stress. Discussed    5. Hyperlipidemia LDL goal <100  excellent  - Lipid [place] ([facility])    6. Diabetic ulcer of toe of left foot associated with type 2 diabetes mellitus, unspecified ulcer stage (H)  Improving with weekly debridement with podiatry    7. Depression with anxiety  Seeing Dr Buenrostro  Working with nutritionist by phone  Worried about losing support system (nurses/doctors/therapists).   This causes anxiety and he eats uncontrolled including high salt items (pickles)               Options for treatment and follow-up care were reviewed with the patient. Clarke Moreira  engaged in the decision making process and verbalized understanding of the options discussed and agreed with the final plan.    Matthias Sanchez MD    40min spent with the patient, >50% in arranging care for multiple issues  PHarper

## 2018-08-28 NOTE — MR AVS SNAPSHOT
After Visit Summary   8/28/2018    Clarke Moreira    MRN: [id number]           Patient Information     Date Of Birth          1950        Visit Information        Provider Department      8/28/2018 2:20 PM Matthias Sanchez MD [facility]        Today's Diagnoses     (HFpEF) heart failure with preserved ejection fraction (H)    -  1    Chronic atrial fibrillation (H)        Hyperlipidemia LDL goal <100        Type 2 diabetes mellitus with hyperglycemia, without long-term current use of insulin (H)        Chronic systolic congestive heart failure (H)          Care Instructions    1. CHF  Continue torasemide 100mg twice daily  Reduce spironolactone 25mg once daily    Balancing eliminating fluid and not getting lightheaded      2. Diabetes  A1c= 5.7       3. INR      4. Wt      5. Attitude  !!    PHarper            Follow-ups after your visit        Your next 10 appointments already scheduled     Sep 07, 2018 11:20 AM CDT   (Arrive by 11:05 AM)   [facility]    [address] [place] [address]  [address] [facility] [address] [phone]            Sep 11, 2018  9:00 AM CDT   Return Visit with [facility] ([facility])    [facility] [address] [place] [address],  [address]  [facility] [id number] [phone]            Oct 30, 2018  9:00 AM CDT   (Arrive by 8:45 AM)   RETURN HEART FAILURE with Christian Willoughby MD   [facility] ([facility])    [address] [place] [address]  [address] [place] [address] [phone]              Who to contact     Please call your clinic at [phone] to:    Ask questions about your health    Make or cancel appointments    Discuss your medicines    Learn about your test results    Speak to your doctor            Additional  Information About Your Visit        Care EveryWhere ID     This is your Care EveryWhere ID. This could be used by other organizations to access your [facility] medical records  [id number]        Your Vitals Were     Pulse Temperature Respirations Pulse Oximetry BMI (Body Mass Index)       72 98.8  F (37.1  C) (Oral) 16 95% 50.18 kg/m2        Blood Pressure from Last 3 Encounters:   08/28/18 135/74   07/18/18 98/61   06/18/18 125/72    Weight from Last 3 Encounters:   08/28/18 (!) 370 lb (167.8 kg)   07/18/18 (!) 375 lb (170.1 kg)   06/18/18 (!) 375 lb 9.6 oz (170.4 kg)              We Performed the Following     Comprehensive Metabolic Panel ([facility])     Hemoglobin A1c ([facility])     INR ([facility] [place])     Lipid Cascade ([facility])     N terminal pro BNP outpatient          Today's Medication Changes          These changes are accurate as of 8/28/18  3:21 PM.  If you have any questions, ask your nurse or doctor.               These medicines have changed or have updated prescriptions.        Dose/Directions    spironolactone 25 MG tablet   Commonly known as:  ALDACTONE   This may have changed:  when to take this   Used for:  Chronic systolic congestive heart failure (H)   Changed by:  Matthias Sanchez MD        Dose:  25 mg   Take 1 tablet (25 mg) by mouth daily   Quantity:  30 tablet   Refills:  3       torsemide 100 MG tablet   Commonly known as:  DEMADEX   This may have changed:  how much to take   Used for:  (HFpEF) heart failure with preserved ejection fraction (H)   Changed by:  Matthias Sanchez MD        Dose:  100 mg   Take 1 tablet (100 mg) by mouth 2 times daily   Quantity:  60 tablet   Refills:  3            Where to get your medicines      These medications were sent to [id number] - [place], [place] - [address]  [address] [place] [address]     Phone:  [phone]     spironolactone 25 MG tablet    torsemide 100 MG tablet                Primary Care Provider Office Phone # Fax #     Matthias Sanchez [phone]       [address] [address]        Equal Access to Services     TYLER RICO : Hadii aad ku hadkem Cotton, shelton baobernadineha, myron brandytisha banda, vasyl ahnender pa. So [facility] [phone].    ATENCIÓN: Si habla español, tiene a kim disposición servicios gratuitos de asistencia lingüística. Llame al [phone].    We comply with applicable federal civil rights laws and [place] laws. We do not discriminate on the basis of race, color, national origin, age, disability, sex, sexual orientation, or gender identity.            Thank you!     Thank you for choosing [facility]  for your care. Our goal is always to provide you with excellent care. Hearing back from our patients is one way we can continue to improve our services. Please take a few minutes to complete the written survey that you may receive in the mail after your visit with us. Thank you!             Your Updated Medication List - Protect others around you: Learn how to safely use, store and throw away your medicines at www.disposemymeds.org.          This list is accurate as of 8/28/18  3:21 PM.  Always use your most recent med list.                   Brand Name Dispense Instructions for use Diagnosis    ammonium lactate 12 % cream    LAC-HYDRIN    385 g    Apply topically 2 times daily as needed for dry skin    Dry skin, Lichen of skin       aspirin 81 MG tablet     30 tablet    Take 1 tablet (81 mg) by mouth daily    Essential hypertension       atorvastatin 40 MG tablet    LIPITOR    30 tablet    Take 1 tablet (40 mg) by mouth daily    Type 2 diabetes mellitus without complication, without long-term current use of insulin (H)       blood glucose monitoring lancets     100 each    Use to test blood sugars 2 times daily or as directed.    Diabetes mellitus, type 2 (H)       blood glucose monitoring meter device kit     1 kit     Use to test blood sugars 2 times daily or as directed.    Type 2 diabetes mellitus with hyperglycemia, without long-term current use of insulin (H)       blood glucose monitoring test strip    ONETOUCH ULTRA    100 strip    Use to test blood sugars 2 times daily or as directed.    Diabetes mellitus, type 2 (H)       levothyroxine 175 MCG tablet    SYNTHROID/LEVOTHROID    30 tablet    Take 1 tablet (175 mcg) by mouth daily    Hypothyroidism, unspecified type       lisinopril 5 MG tablet    PRINIVIL/ZESTRIL    60 tablet    Take 1 tablet (5 mg) by mouth daily Take in Pm    Chronic systolic congestive heart failure (H)       metFORMIN 500 MG tablet    GLUCOPHAGE    60 tablet    Take 1 tablet (500 mg) by mouth 2 times daily (with meals)    Type 2 diabetes mellitus without complication, without long-term current use of insulin (H)       metoprolol succinate 100 MG 24 hr tablet    TOPROL-XL    30 tablet    Take 1 tablet (100 mg) by mouth daily    Essential hypertension with goal blood pressure less than 140/90       multivitamin, therapeutic with minerals Tabs tablet     30 each    Take 1 tablet by mouth daily    Type 2 diabetes mellitus without complication, without long-term current use of insulin (H)       nystatin cream    MYCOSTATIN          omega 3 1000 MG Caps     30 capsule    Take 1 g by mouth daily    Hyperlipidemia LDL goal <100       * order for DME     2 each    Equipment being ordered: Other: Velcro Compression stockings.  Treatment Diagnosis: bilateral lymphedema.    Lymphedema of extremity       * order for DME     1 Units    Equipment being ordered: Bariatric Lift chair Diagnosis - morbid obesity, CHF, Lymphedema  Fax to Ne from [facility] at [phone].    Chronic systolic congestive heart failure (H), Lymphedema, Morbid obesity (H)       polyethylene glycol Packet    MIRALAX/GLYCOLAX    15 packet    Take 17 g by mouth daily as needed for constipation    Constipation, unspecified constipation type        potassium chloride SA 20 MEQ CR tablet    K-DUR/KLOR-CON M    90 tablet    Take 1 tablet (20 mEq) by mouth 2 times daily    Hypokalemia       senna-docusate 8.6-50 MG per tablet    SENOKOT-S;PERICOLACE    60 tablet    Take 1-2 tablets by mouth 2 times daily as needed for constipation    Constipation, unspecified constipation type       spironolactone 25 MG tablet    ALDACTONE    30 tablet    Take 1 tablet (25 mg) by mouth daily    Chronic systolic congestive heart failure (H)       tamsulosin 0.4 MG capsule    FLOMAX    30 capsule    Take 1 capsule (0.4 mg) by mouth daily    Urinary retention       torsemide 100 MG tablet    DEMADEX    60 tablet    Take 1 tablet (100 mg) by mouth 2 times daily    (HFpEF) heart failure with preserved ejection fraction (H)       vitamin B complex with vitamin C Tabs tablet      Take 1 tablet by mouth daily        VITAMIN C PO           VITAMIN E COMPLEX PO           warfarin 5 MG tablet    COUMADIN    75 tablet    Take 2.5 tablets (12.5 mg) by mouth daily    Persistent atrial fibrillation (H)       * Notice:  This list has 2 medication(s) that are the same as other medications prescribed for you. Read the directions carefully, and ask your doctor or other care provider to review them with you.

## 2018-08-28 NOTE — LETTER
September 10, 2018      Clarke Moreira  [address]  [facility] [place] [address]        Dear Clarke,    Thank you for getting your care at [facility]. Please see below for your test results.  Your blood tests were all remarkably good.   The BNP (n-terminal pro bnp)(a measure of heart failure) was better than last time. Your best was 773.  Cholesterol is great  A1c for diabetes is super good.    Resulted Orders   INR (hospitals)   Result Value Ref Range    INR 2.3       Comment:      Adult Normal Range: 0.90 - 1.10  Therapeutic Range: 2.0 - 3.5     Comprehensive Metabolic Panel (hospitals)   Result Value Ref Range    Albumin 4.4 3.5 - 4.7 mg/dL    Alkaline Phosphatase 81.8 31.7 - 110.7 U/L    ALT 18.0 0.0 - 45.0 U/L    AST 18.0 0.0 - 55.0 U/L    Bilirubin Total 0.7 0.2 - 1.3 mg/dL    Urea Nitrogen 34.5 (H) 7.0 - 21.0 mg/dL    Calcium 9.4 8.5 - 10.1 mg/dL    Chloride 99.7 98.0 - 110.0 mmol/L    Carbon Dioxide 29.4 20.0 - 32.0 mmol/L    Creatinine 1.2 0.7 - 1.3 mg/dL    Glucose 99.4 (H) 70.0 - 99.0 mg'dL    Potassium 4.3 3.3 - 4.5 mmol/dL    Sodium 136.9 132.6 - 141.4 mmol/L    Protein Total 7.6 6.8 - 8.8 g/dL    GFR Estimate 64.0 >60.0 mL/min/1.7 m2    GFR Estimate If Black 77.4 >60.0 mL/min/1.7 m2   N terminal pro BNP outpatient   Result Value Ref Range    N-Terminal Pro Bnp 1231 (H) 0 - 125 pg/mL      Comment:         Reference range shown and results flagged as abnormal are for the outpatient,   non acute settings. Establishing a baseline value for each individual patient   is useful for follow-up.  Suggested inpatient cut points for confirming diagnosis of CHF in an acute   setting are:   >450 pg/mL (age 18 to less than 50)   >900 pg/mL (age 50 to less than 75)   >1800 pg/mL (75 yrs and older)  An inpatient or emergency department NT-proPBNP <300 pg/mL effectively rules   out acute CHF, with 99% negative predictive value.      Hemoglobin A1c ([facility])   Result Value Ref Range    Hemoglobin A1C  5.7 4.1 - 5.7 %   Lipid Cascade ([facility])   Result Value Ref Range    Cholesterol 120.3 0.0 - 200.0 mg/dL    Cholesterol/HDL Ratio 2.7 0.0 - 5.0    HDL Cholesterol 44.1 >40.0 mg/dL    LDL Cholesterol Calculated 55 0 - 129 mg/dL    Triglycerides 107.0 0.0 - 150.0 mg/dL    VLDL Cholesterol 21.4 7.0 - 32.0 mg/dL           Sincerely,    Matthias Sanchez MD

## 2018-08-29 ASSESSMENT — PATIENT HEALTH QUESTIONNAIRE - PHQ9: SUM OF ALL RESPONSES TO PHQ QUESTIONS 1-9: 24

## 2018-08-29 ASSESSMENT — ANXIETY QUESTIONNAIRES: GAD7 TOTAL SCORE: 18

## 2018-09-07 ENCOUNTER — OFFICE VISIT (OUTPATIENT)
Dept: ORTHOPEDICS | Facility: CLINIC | Age: 68
End: 2018-09-07
Payer: COMMERCIAL

## 2018-09-07 VITALS — WEIGHT: 315 LBS | BODY MASS INDEX: 50.17 KG/M2

## 2018-09-07 DIAGNOSIS — E11.65 TYPE 2 DIABETES MELLITUS WITH HYPERGLYCEMIA, WITHOUT LONG-TERM CURRENT USE OF INSULIN (H): Primary | ICD-10-CM

## 2018-09-07 DIAGNOSIS — B35.1 DERMATOPHYTOSIS OF NAIL: ICD-10-CM

## 2018-09-07 NOTE — NURSING NOTE
Chief Complaint   Patient presents with     Diabetic Foot Care     Wound Check     left second toe wound       Pain Assessment  Patient Currently in Pain: Denies               Wt (!) 167.8 kg (369 lb 14.4 oz)  BMI 50.17 kg/m2    No Known Allergies    Current Outpatient Prescriptions   Medication Sig Dispense Refill     ammonium lactate (LAC-HYDRIN) 12 % cream Apply topically 2 times daily as needed for dry skin 385 g 1     Ascorbic Acid (VITAMIN C PO)        aspirin 81 MG tablet Take 1 tablet (81 mg) by mouth daily 30 tablet 0     atorvastatin (LIPITOR) 40 MG tablet Take 1 tablet (40 mg) by mouth daily 30 tablet 11     blood glucose monitoring (ONE TOUCH DELICA) lancets Use to test blood sugars 2 times daily or as directed. 100 each 11     blood glucose monitoring (ONE TOUCH ULTRA MINI) meter device kit Use to test blood sugars 2 times daily or as directed. 1 kit 0     blood glucose monitoring (ONETOUCH ULTRA) test strip Use to test blood sugars 2 times daily or as directed. 100 strip 11     levothyroxine (SYNTHROID/LEVOTHROID) 175 MCG tablet Take 1 tablet (175 mcg) by mouth daily 30 tablet 11     lisinopril (PRINIVIL/ZESTRIL) 5 MG tablet Take 1 tablet (5 mg) by mouth daily Take in Pm 60 tablet 3     metFORMIN (GLUCOPHAGE) 500 MG tablet Take 1 tablet (500 mg) by mouth 2 times daily (with meals) 60 tablet 11     metoprolol (TOPROL-XL) 100 MG 24 hr tablet Take 1 tablet (100 mg) by mouth daily 30 tablet 11     multivitamin, therapeutic with minerals (MULTI-VITAMIN) TABS tablet Take 1 tablet by mouth daily 30 each 11     nystatin (MYCOSTATIN) cream   1     omega 3 1000 MG CAPS Take 1 g by mouth daily 30 capsule 11     order for DME Equipment being ordered: Bariatric Lift chair  Diagnosis - morbid obesity, CHF, Lymphedema    Fax to Ne from [employer] at [phone]. 1 Units 0     order for DME Equipment being ordered: Other: Velcro Compression stockings.   Treatment Diagnosis: bilateral lymphedema. 2 each 0      polyethylene glycol (MIRALAX/GLYCOLAX) Packet Take 17 g by mouth daily as needed for constipation 15 packet 11     potassium chloride SA (K-DUR/KLOR-CON M) 20 MEQ CR tablet Take 1 tablet (20 mEq) by mouth 2 times daily 90 tablet 11     senna-docusate (SENOKOT-S;PERICOLACE) 8.6-50 MG per tablet Take 1-2 tablets by mouth 2 times daily as needed for constipation 60 tablet 11     spironolactone (ALDACTONE) 25 MG tablet Take 1 tablet (25 mg) by mouth daily 30 tablet 3     tamsulosin (FLOMAX) 0.4 MG capsule Take 1 capsule (0.4 mg) by mouth daily 30 capsule 11     torsemide (DEMADEX) 100 MG tablet Take 1 tablet (100 mg) by mouth 2 times daily 60 tablet 3     vitamin B complex with vitamin C (VITAMIN  B COMPLEX) TABS tablet Take 1 tablet by mouth daily       VITAMIN E COMPLEX PO        warfarin (COUMADIN) 5 MG tablet Take 2.5 tablets (12.5 mg) by mouth daily 75 tablet 11       Sehy Loera  9/7/2018

## 2018-09-07 NOTE — MR AVS SNAPSHOT
After Visit Summary   9/7/2018    Clarke Moreira    MRN: [id number]           Patient Information     Date Of Birth          1950        Visit Information        Provider Department      9/7/2018 11:20 AM Jesús Lagos DPM [facility]         Follow-ups after your visit        Your next 10 appointments already scheduled     Sep 11, 2018  9:00 AM CDT   Return Visit with Lety Buenrostro PsyD   [facility] ([facility])    [address],  [address]  [facility] [address]   [phone]            Sep 24, 2018 10:40 AM CDT   RETURN EXTENDED with Matthias Sanchez MD   [facility] ([facility])    [address],  [address]  [facility] [address]   [phone]            Oct 30, 2018  9:00 AM CDT   (Arrive by 8:45 AM)   RETURN HEART FAILURE with Christian Willoughby MD   [facility] ([facility])    [address] [place] [address]  [address] [place] [id number] [phone]            Nov 13, 2018 11:20 AM CST   (Arrive by 11:05 AM)   RETURN FOOT/ANKLE with Jesús Lagos DPM   [facility] ([facility])    [address] [place] [address]  4th Floor  [facility] [id number] [phone]              Who to contact     Please call your clinic at [phone] to:    Ask questions about your health    Make or cancel appointments    Discuss your medicines    Learn about your test results    Speak to your doctor            Additional Information About Your Visit        Care EveryWhere ID     This is your Care EveryWhere ID. This could be used by other organizations to access your [facility] medical records  [id number]        Your Vitals Were     BMI (Body Mass Index)                   50.17 kg/m2            Blood Pressure from Last 3 Encounters:   08/28/18 135/74   07/18/18 98/61   06/18/18 125/72    Weight from Last 3 Encounters:    09/07/18 (!) 167.8 kg (369 lb 14.4 oz)   08/28/18 (!) 167.8 kg (370 lb)   07/18/18 (!) 170.1 kg (375 lb)              Today, you had the following     No orders found for display       Primary Care Provider Office Phone # Fax #    Matthias Sanchez [phone]       [address] [address]        Equal Access to Services     TYLER R[place] : Hadii aad ku hadasho Soomaali, waaxda luqadaha, qaybta kaalmada adeegyada, waxay idiin hayaan adeeg samarakunal lasteve . So [facility] [phone].    ATENCIÓN: Si habla español, tiene a kim disposición servicios gratuitos de asistencia lingüística. [facility] [phone].    We comply with applicable federal civil rights laws and [place] laws. We do not discriminate on the basis of race, color, national origin, age, disability, sex, sexual orientation, or gender identity.            Thank you!     Thank you for choosing [facility]  for your care. Our goal is always to provide you with excellent care. Hearing back from our patients is one way we can continue to improve our services. Please take a few minutes to complete the written survey that you may receive in the mail after your visit with us. Thank you!             Your Updated Medication List - Protect others around you: Learn how to safely use, store and throw away your medicines at www.disposemymeds.org.          This list is accurate as of 9/7/18 11:27 AM.  Always use your most recent med list.                   Brand Name Dispense Instructions for use Diagnosis    ammonium lactate 12 % cream    LAC-HYDRIN    385 g    Apply topically 2 times daily as needed for dry skin    Dry skin, Lichen of skin       aspirin 81 MG tablet     30 tablet    Take 1 tablet (81 mg) by mouth daily    Essential hypertension       atorvastatin 40 MG tablet    LIPITOR    30 tablet    Take 1 tablet (40 mg) by mouth daily    Type 2 diabetes mellitus without complication, without long-term current  use of insulin (H)       blood glucose monitoring lancets     100 each    Use to test blood sugars 2 times daily or as directed.    Diabetes mellitus, type 2 (H)       blood glucose monitoring meter device kit     1 kit    Use to test blood sugars 2 times daily or as directed.    Type 2 diabetes mellitus with hyperglycemia, without long-term current use of insulin (H)       blood glucose monitoring test strip    ONETOUCH ULTRA    100 strip    Use to test blood sugars 2 times daily or as directed.    Diabetes mellitus, type 2 (H)       levothyroxine 175 MCG tablet    SYNTHROID/LEVOTHROID    30 tablet    Take 1 tablet (175 mcg) by mouth daily    Hypothyroidism, unspecified type       lisinopril 5 MG tablet    PRINIVIL/ZESTRIL    60 tablet    Take 1 tablet (5 mg) by mouth daily Take in Pm    Chronic systolic congestive heart failure (H)       metFORMIN 500 MG tablet    GLUCOPHAGE    60 tablet    Take 1 tablet (500 mg) by mouth 2 times daily (with meals)    Type 2 diabetes mellitus without complication, without long-term current use of insulin (H)       metoprolol succinate 100 MG 24 hr tablet    TOPROL-XL    30 tablet    Take 1 tablet (100 mg) by mouth daily    Essential hypertension with goal blood pressure less than 140/90       multivitamin, therapeutic with minerals Tabs tablet     30 each    Take 1 tablet by mouth daily    Type 2 diabetes mellitus without complication, without long-term current use of insulin (H)       nystatin cream    MYCOSTATIN          omega 3 1000 MG Caps     30 capsule    Take 1 g by mouth daily    Hyperlipidemia LDL goal <100       * order for DME     2 each    Equipment being ordered: Other: Velcro Compression stockings.  Treatment Diagnosis: bilateral lymphedema.    Lymphedema of extremity       * order for DME     1 Units    Equipment being ordered: Bariatric Lift chair Diagnosis - morbid obesity, CHF, Lymphedema  Fax to Ne from [employer] at [phone].    Chronic systolic  congestive heart failure (H), Lymphedema, Morbid obesity (H)       polyethylene glycol Packet    MIRALAX/GLYCOLAX    15 packet    Take 17 g by mouth daily as needed for constipation    Constipation, unspecified constipation type       potassium chloride SA 20 MEQ CR tablet    K-DUR/KLOR-CON M    90 tablet    Take 1 tablet (20 mEq) by mouth 2 times daily    Hypokalemia       senna-docusate 8.6-50 MG per tablet    SENOKOT-S;PERICOLACE    60 tablet    Take 1-2 tablets by mouth 2 times daily as needed for constipation    Constipation, unspecified constipation type       spironolactone 25 MG tablet    ALDACTONE    30 tablet    Take 1 tablet (25 mg) by mouth daily    Chronic systolic congestive heart failure (H)       tamsulosin 0.4 MG capsule    FLOMAX    30 capsule    Take 1 capsule (0.4 mg) by mouth daily    Urinary retention       torsemide 100 MG tablet    DEMADEX    60 tablet    Take 1 tablet (100 mg) by mouth 2 times daily    (HFpEF) heart failure with preserved ejection fraction (H)       vitamin B complex with vitamin C Tabs tablet      Take 1 tablet by mouth daily        VITAMIN C PO           VITAMIN E COMPLEX PO           warfarin 5 MG tablet    COUMADIN    75 tablet    Take 2.5 tablets (12.5 mg) by mouth daily    Persistent atrial fibrillation (H)       * Notice:  This list has 2 medication(s) that are the same as other medications prescribed for you. Read the directions carefully, and ask your doctor or other care provider to review them with you.

## 2018-09-07 NOTE — PROGRESS NOTES
Chief Complaints and History of Present Illnesses   Patient presents with     Diabetic Foot Care     Wound Check     left second toe wound          No Known Allergies      Subjective: Clarke is a 68 year old male who presents to the clinic today for a diabetic foot exam and management. He has been seen by Paula Emery PA-C, for a wound on the distal left 2nd digit. She has expertly healed this ulceration.     Objective  DP and PT pulses cannot be palpated.  Diminished pedal hair.    Protective sensation diminished.  He does relate some numbness in the bottom of both feet.  Nails are thickened, elongated, yellow, brittle, with subungual debris debris consistent with onychomycosis ×10.   There is some thickening to the right distal Achilles, with pain noted with palpation of this area.   Hyperkeratosis with intrasubstance bleeding noted to the left distal 2nd digit. Debrided down and no ulceration noted. No s/s of infection noted.      Assessment: Type 2 diabetes with neuropathy.    Onychomycosis ×10.    Lymphedema  Tyloma      Plan:  - Pt seen and evaluated.  - Nails were debrided x 10.   - See again in 2 months or sooner if problems arise.

## 2018-09-07 NOTE — LETTER
9/7/2018       RE: Clarke Moreira  [address]  [facility] [address]     Dear Colleague,    Thank you for referring your patient, Clarke Moreira, to the [facility]. Please see a copy of my visit note below.    Chief Complaints and History of Present Illnesses   Patient presents with     Diabetic Foot Care     Wound Check     left second toe wound          No Known Allergies      Subjective: Clarke is a 68 year old male who presents to the clinic today for a diabetic foot exam and management. He has been seen by Paula Emery PA-C, for a wound on the distal left 2nd digit. She has expertly healed this ulceration.     Objective  DP and PT pulses cannot be palpated.  Diminished pedal hair.    Protective sensation diminished.  He does relate some numbness in the bottom of both feet.  Nails are thickened, elongated, yellow, brittle, with subungual debris debris consistent with onychomycosis ×10.   There is some thickening to the right distal Achilles, with pain noted with palpation of this area.   Hyperkeratosis with intrasubstance bleeding noted to the left distal 2nd digit. Debrided down and no ulceration noted. No s/s of infection noted.      Assessment: Type 2 diabetes with neuropathy.    Onychomycosis ×10.    Lymphedema  Tyloma      Plan:  - Pt seen and evaluated.  - Nails were debrided x 10.   - See again in 2 months or sooner if problems arise.         Again, thank you for allowing me to participate in the care of your patient.      Sincerely,    Jesús Lagos DPM

## 2018-09-09 DIAGNOSIS — Z13.9 SCREENING FOR CONDITION: ICD-10-CM

## 2018-09-09 PROCEDURE — 82274 ASSAY TEST FOR BLOOD FECAL: CPT | Performed by: FAMILY MEDICINE

## 2018-09-10 DIAGNOSIS — B35.3 TINEA PEDIS, UNSPECIFIED LATERALITY: Primary | ICD-10-CM

## 2018-09-10 RX ORDER — NYSTATIN 100000 U/G
CREAM TOPICAL
Qty: 30 G | Refills: 3 | Status: ON HOLD | OUTPATIENT
Start: 2018-09-10 | End: 2019-03-16

## 2018-09-10 NOTE — TELEPHONE ENCOUNTER

## 2018-09-11 ENCOUNTER — OFFICE VISIT (OUTPATIENT)
Dept: PSYCHOLOGY | Facility: CLINIC | Age: 68
End: 2018-09-11
Payer: COMMERCIAL

## 2018-09-11 DIAGNOSIS — F32.89 OTHER DEPRESSION: Primary | ICD-10-CM

## 2018-09-11 NOTE — PROGRESS NOTES
"Behavioral Health Progress Note    Client Legal Name: Clarke Moreira   Client Preferred Name: Clarke   Service Type: Individual  Length of Visit: 30 minutes  Attendees: patient     Identifying Information and Presenting Problem:    The patient is a 68 year old American male who is being seen for the second time today by this provider for problematic symptoms of depression and ongoing adjustment to medical illness.    Treatment Objective(s) Addressed in This Session:  Continuing to establish rapport and build trust with Clarke, treatment was not completed today    Progress on / Status of Treatment Objective(s) / Homework:  n/a    PHQ-9 SCORE 5/21/2018 8/23/2018 8/28/2018   Total Score - - -   Total Score 18 24 24       CHRIS-7 SCORE 11/27/2017 8/23/2018 8/28/2018   Total Score - - -   Total Score 21 18 18     Topics Discussed/Interventions Provided:  \"I live a life of sustenance and not aspiration.\"    Clarke arrived in our session quite frustrated today.  He had made all the arrangements for his medical ride and then they showed up an hour late.  He went downstairs to wait for them and does not have a cell phone, so he did not have a way to call the company to find out what was happening.  The ride eventually showed up and got him here to clinic, so he arrived about 15 minutes late today.  Clarke describes that he spent all day yesterday sick with anxiety thinking about getting to this appointment today.  It is very hard for him to leave the house due to his anxiety and he worries about all the logistics about getting here to the point that he can not focus on anything else.  He tried to distract himself with other activities yesterday, but was not able to do so.    Clarke reported feeling \"enraged\" by the situation this morning.  He is angry at himself that he is not able to take care of himself, by getting himself to appointments.  He sees himself as deficient in being independent in all areas of his life.  " He felt very angry with the  today for being late, but then couldn't say anything to the  about his anger.  He gets scared that if he starts to try to be assertive all the anger will come out and he won't be able to control it.  He then says nothing and this leaves him feeling helpless and stuck.  Clarke is scared of intense emotions because there have been numerous times in his life when emotions got very intense and then he basically lost time - he couldn't remember anything of the event.  He didn't do anything harmful toward himself or others during these episodes.  He just finds himself in a different place hours later.  Because those episodes were happening, this is how he started to stay in his apartment more and more and why it is so difficult to leave his apartment now.      When the logistics don't go well, Clarke feels unimportant and this starts to tap into core beliefs he holds about his worthlessness as a human being.  He reports his mom called him worthless all the time and despite years of therapy he has not been able to view himself any differently.      Thanked Clarke for making the effort to still come into the appt today despite all the hurdles as this was very hard for him. Let him know that I always try to see patients for whatever time is left in their appts even if they are late, as things happen sometimes that make being on time difficult.      Assessment: The patient appeared to be active and engaged in today's session and was receptive to feedback.     Mental Status: Clarke was alert and oriented. He was slightly disheveled, but he was dressed in clean clothes.   Eye contact was good. Speech was of normal rate and rhythm. Current mood was described as enraged.  Despite using that word multiple times, he did not present as angry. Affect seemed more anxious. Thought processes were relevant and coherent. Thought content was WNL with no evidence of psychotic or paranoid  features. No thoughts of harming himself or others reported today.  Memory appeared grossly intact. Insight and judgment appeared fair and patient exhibited good impulse control during the appointment.     Does the patient appear to be at imminent risk of harm to self/others at this time? No    The session was necessary to continue to build rapport and establish trust with Calrke.  He appears to feel quite vulnerable in regard to acknowledging his needs and wanting acceptance from the health care providers and others in his life.  He is going to think about what some of his goals for therapy will be over the next couple of weeks.  Clarke states depression is OK right now, but he continues to worry that he could revert to the level of depression he has experienced in the past.     Ongoing psychotherapy is necessary to stabilize mood, provide counseling, improve functioning with daily activities, provide psychoeducation and provide support.    Diagnosis (DSM-5):  Unspecified depression    Plan:  1. Follow up in 2 weeks.  2. Complete diagnostic intake and treatment plan at subsequent visits.

## 2018-09-11 NOTE — MR AVS SNAPSHOT
After Visit Summary   9/11/2018    Clarke Moreira    MRN: [id number]           Patient Information     Date Of Birth          1950        Visit Information        Provider Department      9/11/2018 9:00 AM Lety Buenrostro PsyD [facility]        Today's Diagnoses     Other depression    -  1       Follow-ups after your visit        Your next 10 appointments already scheduled     Sep 24, 2018 10:40 AM CDT   RETURN EXTENDED with MD [facility] ([facility])    [address],  [address]  [facility] [address]   [phone]            Oct 03, 2018 10:40 AM CDT   Return Visit with [facility] ([facility])    [address],  [address] [address]   [phone]            Oct 30, 2018  9:00 AM CDT   (Arrive by 8:45 AM)   RETURN HEART FAILURE with Christian Willoughby MD   [facility] ([facility])    [address] [place] [address] [id number] [phone]            Nov 13, 2018 11:20 AM CST   (Arrive by 11:05 AM)   RETURN [facility]LE [facility])    [address] [place] [address]  [facility] [id number] [phone]              Who to contact     Please call your clinic at [phone] to:    Ask questions about your health    Make or cancel appointments    Discuss your medicines    Learn about your test results    Speak to your doctor            Additional Information About Your Visit        Care EveryWhere ID     This is your Care EveryWhere ID. This could be used by other organizations to access your [facility] medical records  [id number]         Blood Pressure from Last 3 Encounters:   08/28/18 135/74   07/18/18 98/61   06/18/18 125/72    Weight from Last 3 Encounters:   09/07/18 (!) 369 lb 14.4 oz  (167.8 kg)   08/28/18 (!) 370 lb (167.8 kg)   07/18/18 (!) 375 lb (170.1 kg)              Today, you had the following     No orders found for display       Primary Care Provider Office Phone # Fax #    Matthias Sanchez [phone]       [address] [address]        Equal Access to Services     TYLER [place] : Hadii aad ku hadasho Soomaali, waaxda luqadaha, qaybta kaalmada adeegyada, waxay idiin hayaan adeeg khgersonsh laNixondomingon ah. So [facility] [phone].    ATENCIÓN: Si yolanda leal, tiene a kim disposición servicios gratuitos de asistencia lingüística. Llame al [phone].    We comply with applicable federal civil rights laws and [place] laws. We do not discriminate on the basis of race, color, national origin, age, disability, sex, sexual orientation, or gender identity.            Thank you!     Thank you for choosing [facility]  for your care. Our goal is always to provide you with excellent care. Hearing back from our patients is one way we can continue to improve our services. Please take a few minutes to complete the written survey that you may receive in the mail after your visit with us. Thank you!             Your Updated Medication List - Protect others around you: Learn how to safely use, store and throw away your medicines at www.disposemymeds.org.          This list is accurate as of 9/11/18 11:59 PM.  Always use your most recent med list.                   Brand Name Dispense Instructions for use Diagnosis    ammonium lactate 12 % cream    LAC-HYDRIN    385 g    Apply topically 2 times daily as needed for dry skin    Dry skin, Lichen of skin       aspirin 81 MG tablet     30 tablet    Take 1 tablet (81 mg) by mouth daily    Essential hypertension       atorvastatin 40 MG tablet    LIPITOR    30 tablet    Take 1 tablet (40 mg) by mouth daily    Type 2 diabetes mellitus without complication, without long-term current use of insulin (H)        blood glucose monitoring lancets     100 each    Use to test blood sugars 2 times daily or as directed.    Diabetes mellitus, type 2 (H)       blood glucose monitoring meter device kit     1 kit    Use to test blood sugars 2 times daily or as directed.    Type 2 diabetes mellitus with hyperglycemia, without long-term current use of insulin (H)       blood glucose monitoring test strip    ONETOUCH ULTRA    100 strip    Use to test blood sugars 2 times daily or as directed.    Diabetes mellitus, type 2 (H)       levothyroxine 175 MCG tablet    SYNTHROID/LEVOTHROID    30 tablet    Take 1 tablet (175 mcg) by mouth daily    Hypothyroidism, unspecified type       lisinopril 5 MG tablet    PRINIVIL/ZESTRIL    60 tablet    Take 1 tablet (5 mg) by mouth daily Take in Pm    Chronic systolic congestive heart failure (H)       metFORMIN 500 MG tablet    GLUCOPHAGE    60 tablet    Take 1 tablet (500 mg) by mouth 2 times daily (with meals)    Type 2 diabetes mellitus without complication, without long-term current use of insulin (H)       metoprolol succinate 100 MG 24 hr tablet    TOPROL-XL    30 tablet    Take 1 tablet (100 mg) by mouth daily    Essential hypertension with goal blood pressure less than 140/90       multivitamin, therapeutic with minerals Tabs tablet     30 each    Take 1 tablet by mouth daily    Type 2 diabetes mellitus without complication, without long-term current use of insulin (H)       nystatin cream    MYCOSTATIN    30 g    Apply twice daily to affected area    Tinea pedis, unspecified laterality       omega 3 1000 MG Caps     30 capsule    Take 1 g by mouth daily    Hyperlipidemia LDL goal <100       * order for DME     2 each    Equipment being ordered: Other: Velcro Compression stockings.  Treatment Diagnosis: bilateral lymphedema.    Lymphedema of extremity       * order for DME     1 Units    Equipment being ordered: Bariatric Lift chair Diagnosis - morbid obesity, CHF, Lymphedema  Fax to Ne  from [employer] at [phone].    Chronic systolic congestive heart failure (H), Lymphedema, Morbid obesity (H)       polyethylene glycol Packet    MIRALAX/GLYCOLAX    15 packet    Take 17 g by mouth daily as needed for constipation    Constipation, unspecified constipation type       potassium chloride SA 20 MEQ CR tablet    K-DUR/KLOR-CON M    90 tablet    Take 1 tablet (20 mEq) by mouth 2 times daily    Hypokalemia       senna-docusate 8.6-50 MG per tablet    SENOKOT-S;PERICOLACE    60 tablet    Take 1-2 tablets by mouth 2 times daily as needed for constipation    Constipation, unspecified constipation type       spironolactone 25 MG tablet    ALDACTONE    30 tablet    Take 1 tablet (25 mg) by mouth daily    Chronic systolic congestive heart failure (H)       tamsulosin 0.4 MG capsule    FLOMAX    30 capsule    Take 1 capsule (0.4 mg) by mouth daily    Urinary retention       torsemide 100 MG tablet    DEMADEX    60 tablet    Take 1 tablet (100 mg) by mouth 2 times daily    (HFpEF) heart failure with preserved ejection fraction (H)       vitamin B complex with vitamin C Tabs tablet      Take 1 tablet by mouth daily        VITAMIN C PO           VITAMIN E COMPLEX PO           warfarin 5 MG tablet    COUMADIN    75 tablet    Take 2.5 tablets (12.5 mg) by mouth daily    Persistent atrial fibrillation (H)       * Notice:  This list has 2 medication(s) that are the same as other medications prescribed for you. Read the directions carefully, and ask your doctor or other care provider to review them with you.

## 2018-09-14 ENCOUNTER — CARE COORDINATION (OUTPATIENT)
Dept: CARDIOLOGY | Facility: CLINIC | Age: 68
End: 2018-09-14

## 2018-09-14 NOTE — PROGRESS NOTES
Cardiocom weights reviewed. Weight noted to be up 6 lbs in 7 days. Clarke says he's never felt better. He says as he is physically and emotionally starting to feel better he also thinks he's eating more. He denies any worsening SOB or LE edema.  Told him we would continue to watch his weights and he should call for any worsening symptoms such as SOB or LE edema.

## 2018-09-15 LAB — HEMOCCULT STL QL IA: NEGATIVE

## 2018-09-20 ENCOUNTER — DOCUMENTATION ONLY (OUTPATIENT)
Dept: CARE COORDINATION | Facility: CLINIC | Age: 68
End: 2018-09-20

## 2018-09-20 NOTE — PROGRESS NOTES
Requesting additional OT home care orders for 2m1 (October) and 3m1 (November) to address modifications with HEP for RUE strengthening and ROM and ergonomic assessment of computer workstation.

## 2018-09-21 NOTE — PROGRESS NOTES
OK for OT home care orders for 2m1 (October) and 3m1 (November) to address modifications with HEP for RUE strengthening and ROM and ergonomic assessment of computer workstation.    Jack

## 2018-10-09 ENCOUNTER — PRE VISIT (OUTPATIENT)
Dept: SLEEP MEDICINE | Facility: CLINIC | Age: 68
End: 2018-10-09

## 2018-10-09 NOTE — TELEPHONE ENCOUNTER
"  1.  Reason for the visit:  Consult to establish care for Bipap device.  PT states he was hospitalized at [facility]  and at that time was put on bipap due to blood not oxygenated.  Pt states he has been using Bipap nightly since and has noticed a difference in the way he feel and would like to continue with device and get supplies can complete any necessary steps needed by insurance to keep  2.  Referring provider and clinic name:  None Self  3.  Previous Sleep Doctor or Pulmonlogist (clinic name)?  None  4.  Records, Procedures, Imaging, and Labs (see below)  In Epic        All NOTES from previous office visits that pertain to why they are being seen in the Sleep Center    Previous Sleep Studies, Chest CT, Echos and reports that pertain to why they are seeing Sleep Center    All Sleep records that have been done in the last 2 years that pertain to why they are seeing Sleep Center            Are they being seen for continuation of care for Cpap/Bipap/Avap/Trilogy/Dental Device? Yes Bipap    If yes to above Who and Where was Device issued/currently getting supplies from? [facility]    Are you currently on \"Supplemental Oxygen\" during the day or night?   No                                                                                                                                                      Please remind pt to bring Cpap machine and ask to arrive 15 minutes early to appointment due traffic and congestion                                               5. Pt Sleep Center Packet received PT has completed and will bring to appointment    Yes: \"please make sure that you bring this to your appointment completed, either the doctor will not see you until this completed or you may be asked to reschedule your appointment.\"     No: mail or email to the pt and explain, \"please make sure that you bring this to your appointment completed, either the doctor will not see you until this completed or you may be " "asked to reschedule your appointment.\"     ~If pt coming early to fill packet out, ask that they come 30 minutes prior to their appointment~     6. Has the pt's medication list been updated and preferred pharmacy added?     7. Has the allergy list been reviewed?    \"Thank you for choosing [facility] and we look forward to seeing you at your upcoming appointment\"     "

## 2018-10-10 ENCOUNTER — OFFICE VISIT (OUTPATIENT)
Dept: SLEEP MEDICINE | Facility: CLINIC | Age: 68
End: 2018-10-10
Payer: COMMERCIAL

## 2018-10-10 VITALS
HEART RATE: 64 BPM | SYSTOLIC BLOOD PRESSURE: 101 MMHG | OXYGEN SATURATION: 98 % | RESPIRATION RATE: 18 BRPM | BODY MASS INDEX: 42.66 KG/M2 | HEIGHT: 72 IN | DIASTOLIC BLOOD PRESSURE: 65 MMHG | WEIGHT: 315 LBS

## 2018-10-10 DIAGNOSIS — J96.12 CHRONIC HYPERCAPNIC RESPIRATORY FAILURE (H): Primary | ICD-10-CM

## 2018-10-10 DIAGNOSIS — Z99.89 BIPAP (BIPHASIC POSITIVE AIRWAY PRESSURE) DEPENDENCE: ICD-10-CM

## 2018-10-10 DIAGNOSIS — E66.2 HYPOVENTILATION ASSOCIATED WITH OBESITY SYNDROME (H): ICD-10-CM

## 2018-10-10 PROCEDURE — 99205 OFFICE O/P NEW HI 60 MIN: CPT | Performed by: INTERNAL MEDICINE

## 2018-10-10 RX ORDER — ZOLPIDEM TARTRATE 5 MG/1
TABLET ORAL
Qty: 1 TABLET | Refills: 0 | Status: SHIPPED | OUTPATIENT
Start: 2018-10-10 | End: 2019-02-19

## 2018-10-10 NOTE — MR AVS SNAPSHOT
"              After Visit Summary   10/10/2018    Clarke Moreira    MRN: [id number]           Patient Information     Date Of Birth          1950        Visit Information        Provider Department      10/10/2018 1:00 PM Renuka Gordon MD [facility]        Today's Diagnoses     Chronic hypercapnic respiratory failure (H)    -  1    Hypoventilation associated with obesity syndrome (H)        BiPAP (biphasic positive airway pressure) dependence          Care Instructions    Please get seen right away for your symptoms of lightheadedness,    Take diuretic earlier in the day    MY TREATMENT INFORMATION FOR SLEEP APNEA-  Clarke Moreira    DOCTOR : Renuka Gordon  SLEEP CENTER :      MY CONTACT NUMBER:     Am I having a sleep study at a sleep center?  Make sure you have an appointment for the study before you leave!    Am I having a home sleep study?  Watch this video:  https://www.[employer].com/watch?v=CteI_GhyP9g&[email or web address]  Please verify your insurance coverage with your insurance carrier    Frequently asked questions:  1. What is Obstructive Sleep Apnea (THONG)? THONG is the most common type of sleep apnea. Apnea means, \"without breath.\"  Apnea is most often caused by narrowing or collapse of the upper airway as muscles relax during sleep.   Almost everyone has occasional apneas. Most people with sleep apnea have had brief interruptions at night frequently for many years.  The severity of sleep apnea is related to how frequent and severe the events are.   2. What are the consequences of THONG? Symptoms include: feeling sleepy during the day, snoring loudly, gasping or stopping of breathing, trouble sleeping, and occasionally morning headaches or heartburn at night.  Sleepiness can be serious and even increase the risk of falling asleep while driving. Other health consequences may include development of high blood pressure and other " cardiovascular disease in persons who are susceptible. Untreated THONG  can contribute to heart disease, stroke and diabetes.   3. What are the treatment options? In most situations, sleep apnea is a lifelong disease that must be managed with daily therapy. Medications are not effective for sleep apnea and surgery is generally not considered until other therapies have been tried. Your treatment is your choice . Continuous Positive Airway (CPAP) works right away and is the therapy that is effective in nearly everyone. An oral device to hold your jaw forward is usually the next most reliable option. Other options include postioning devices (to keep you off your back), weight loss, and surgery including a tongue pacing device. There is more detail about some of these options below.    Important tips for using CPAP and similar devices   Know your equipment:  CPAP is continuous positive airway pressure that prevents obstructive sleep apnea by keeping the throat from collapsing while you are sleeping. In most cases, the device is  smart  and can slowly self-adjusts if your throat collapses and keeps a record every day of how well you are treated-this information is available to you and your care team.  BPAP is bilevel positive airway pressure that keeps your throat open and also assists each breath with a pressure boost to maintain adequate breathing.  Special kinds of BPAP are used in patients who have inadequate breathing from lung or heart disease. In most cases, the device is  smart  and can slowly self-adjusts to assist breathing. Like CPAP, the device keeps a record of how well you are treated.  Your mask is your connection to the device. You get to choose what feels most comfortable and the staff will help to make sure if fits. Here: are some examples of the different masks that are available:       Key points to remember on your journey with sleep apnea:  1. Sleep study.  PAP devices often need to be adjusted during  a sleep study to show that they are effective and adjusted right.  2. Good tips to remember: Try wearing just the mask during a quiet time during the day so your body adapts to wearing it. A humidifier is recommended for comfort in most cases to prevent drying of your nose and throat. Allergy medication from your provider may help you if you are having nasal congestion.  3. Getting settled-in. It takes more than one night for most of us to get used to wearing a mask. Try wearing just the mask during a quiet time during the day so your body adapts to wearing it. A humidifier is recommended for comfort in most cases. Our team will work with you carefully on the first day and will be in contact within 4 days and again at 2 and 4 weeks for advice and remote device adjustments. Your therapy is evaluated by the device each day.   4. Use it every night. The more you are able to sleep naturally for 7-8 hours, the more likely you will have good sleep and to prevent health risks or symptoms from sleep apnea. Even if you use it 4 hours it helps. Occasionally all of us are unable to use a medical therapy, in sleep apnea, it is not dangerous to miss one night.   5. Communicate. Call our skilled team on the number provided on the first day if your visit for problems that make it difficult to wear the device. Over 2 out of 3 patients can learn to wear the device long-term with help from our team. Remember to call our team or your sleep providers if you are unable to wear the device as we may have other solutions for those who cannot adapt to mask CPAP therapy. It is recommended that you sleep your sleep provider within the first 3 months and yearly after that if you are not having problems.   Take care of your equipment. Make sure you clean your mask and tubing using directions every day and that your filter and mask are replaced as recommended or if they are not working.     BESIDES CPAP, WHAT OTHER THERAPIES ARE  THERE?    Positioning Device  Positioning devices are generally used when sleep apnea is mild and only occurs on your back.This example shows a pillow that straps around the waist. It may be appropriate for those whose sleep study shows milder sleep apnea that occurs primarily when lying flat on one's back. Preliminary studies have shown benefit but effectiveness at home may need to be verified by a home sleep test. These devices are generally not covered by medical insurance.  Examples of devices that maintain sleeping on the back to prevent snoring and mild sleep apnea.    Belt type body positioner  Http://inDplay/    Electronic reminder  Http://nightshifttherapy.com/  Http://[email or web address]/    Oral Appliance  What is oral appliance therapy?  An oral appliance device fits on your teeth at night like a retainer used after having braces. The device is made by a specialized dentist and requires several visits over 1-2 months before a manufactured device is made to fit your teeth and is adjusted to prevent your sleep apnea. Once an oral device is working properly, snoring should be improved. A home sleep test may be recommended at that time if to determine whether the sleep apnea is adequately treated.       Some things to remember:  -Oral devices are often, but not always, covered by your medical insurance. Be sure to check with your insurance provider.   -If you are referred for oral therapy, you will be given a list of specialized dentists to consider or you may choose to visit the Web site of the American Academy of Dental Sleep Medicine  -Oral devices are less likely to work if you have severe sleep apnea or are extremely overweight.     More detailed information  An oral appliance is a small acrylic device that fits over the upper and lower teeth  (similar to a retainer or a mouth guard). This device slightly moves jaw forward, which moves the base of the tongue forward, opens the airway, improves  breathing for effective treat snoring and obstructive sleep apnea in perhaps 7 out of 10 people .  The best working devices are custom-made by a dental device  after a mold is made of the teeth 1, 2, 3.  When is an oral appliance indicated?  Oral appliance therapy is recommended as a first-line treatment for patients with primary snoring, mild sleep apnea, and for patients with moderate sleep apnea who prefer appliance therapy to use of CPAP4, 5. Severity of sleep apnea is determined by sleep testing and is based on the number of respiratory events per hour of sleep.   How successful is oral appliance therapy?  The success rate of oral appliance therapy in patients with mild sleep apnea is 75-80% while in patients with moderate sleep apnea it is 50-70%. The chance of success in patients with severe sleep apnea is 40-50%. The research also shows that oral appliances have a beneficial effect on the cardiovascular health of THONG patients at the same magnitude as CPAP therapy7.  Oral appliances should be a second-line treatment in cases of severe sleep apnea, but if not completely successful then a combination therapy utilizing CPAP plus oral appliance therapy may be effective. Oral appliances tend to be effective in a broad range of patients although studies show that the patients who have the highest success are females, younger patients, those with milder disease, and less severe obesity. 3, 6.   Finding a dentist that practices dental sleep medicine  Specific training is available through the American Academy of Dental Sleep Medicine for dentists interested in working in the field of sleep. To find a dentist who is educated in the field of sleep and the use of oral appliances, near you, visit the Web site of the American Academy of Dental Sleep Medicine.    References  1. Fozia et al. Objectively measured vs self-reported compliance during oral appliance therapy for sleep-disordered breathing. Chest  2013; 144(5): 8236-1300.  2. Nahum, et al. Objective measurement of compliance during oral appliance therapy for sleep-disordered breathing. Thorax 2013; 68(1): 91-96.  3. Fina et al. Mandibular advancement devices in 620 men and women with THONG and snoring: tolerability and predictors of treatment success. Chest 2004; 125: 6642-0187.  4. Yumiko et al. Oral appliances for snoring and THONG: a review. Sleep 2006; 29: 244-262.  5. Jatinder et al. Oral appliance treatment for THONG: an update. J Clin Sleep Med 2014; 10(2): 215-227.  6. Carl et al. Predictors of OSAH treatment outcome. J Dent Res 2007; 86: 2333-0823.          Surgery:    Surgery for obstructive sleep apnea is considered generally only when other therapies fail to work. Surgery may be discussed with you if you are having a difficult time tolerating CPAP and or when there is an abnormal structure that requires surgical correction.  Nose and throat surgeries often enlarge the airway to prevent collapse.  Most of these surgeries create pain for 1-2 weeks and up to half of the most common surgeries are not effective throughout life.  You should carefully discuss the benefits and drawbacks to surgery with your sleep provider and surgeon to determine if it is the best solution for you.   More information  Surgery for THONG is directed at areas that are responsible for narrowing or complete obstruction of the airway during sleep.  There are a wide range of procedures available to enlarge and/or stabilize the airway to prevent blockage of breathing in the three major areas where it can occur: the palate, tongue, and nasal regions.  Successful surgical treatment depends on the accurate identification of the factors responsible for obstructive sleep apnea in each person.  A personalized approach is required because there is no single treatment that works well for everyone.  Because of anatomic variation, consultation with an examination by a sleep  surgeon is a critical first step in determining what surgical options are best for each patient.  In some cases, examination during sedation may be recommended in order to guide the selection of procedures.  Patients will be counseled about risks and benefits as well as the typical recovery course after surgery. Surgery is typically not a cure for a person s THONG.  However, surgery will often significantly improve one s THONG severity (termed  success rate ).  Even in the absence of a cure, surgery will decrease the cardiovascular risk associated with OSA7; improve overall quality of life8 (sleepiness, functionality, sleep quality, etc).      Palate Procedures:  Patients with THONG often have narrowing of their airway in the region of their tonsils and uvula.  The goals of palate procedures are to widen the airway in this region as well as to help the tissues resist collapse.  Modern palate procedure techniques focus on tissue conservation and soft tissue rearrangement, rather than tissue removal.  Often the uvula is preserved in this procedure. Residual sleep apnea is common in patient after pharyngoplasty with an average reduction in sleep apnea events of 33%2.      Tongue Procedures:  ExamWhile patients are awake, the muscles that surround the throat are active and keep this region open for breathing. These muscles relax during sleep, allowing the tongue and other structures to collapse and block breathing.  There are several different tongue procedures available.  Selection of a tongue base procedure depends on characteristics seen on physical exam.  Generally, procedures are aimed at removing bulky tissues in this area or preventing the back of the tongue from falling back during sleep.  Success rates for tongue surgery range from 50-62%3.    Hypoglossal Nerve Stimulation:  Hypoglossal nerve stimulation has recently received approval from the [place] Food and Drug Administration for the treatment of  obstructive sleep apnea.  This is based on research showing that the system was safe and effective in treating sleep apnea6.  Results showed that the median AHI score decreased 68%, from 29.3 to 9.0. This therapy uses an implant system that senses breathing patterns and delivers mild stimulation to airway muscles, which keeps the airway open during sleep.  The system consists of three fully implanted components: a small generator (similar in size to a pacemaker), a breathing sensor, and a stimulation lead.  Using a small handheld remote, a patient turns the therapy on before bed and off upon awakening.    Candidates for this device must be greater than 22 years of age, have moderate to severe THONG (AHI between 20-65), BMI less than 32, have tried CPAP/oral appliance without success, and have appropriate upper airway anatomy (determined by a sleep endoscopy performed by Dr. Mckinley).    Hypoglossal Nerve Stimulation Pathway:    The sleep surgeon s office will work with the patient through the insurance prior-authorization process (including communications and appeals).    Nasal Procedures:  Nasal obstruction can interfere with nasal breathing during the day and night.  Studies have shown that relief of nasal obstruction can improve the ability of some patients to tolerate positive airway pressure therapy for obstructive sleep apnea1.  Treatment options include medications such as nasal saline, topical corticosteroid and antihistamine sprays, and oral medications such as antihistamines or decongestants. Non-surgical treatments can include external nasal dilators for selected patients. If these are not successful by themselves, surgery can improve the nasal airway either alone or in combination with these other options.      Combination Procedures:  Combination of surgical procedures and other treatments may be recommended, particularly if patients have more than one area of narrowing or persistent positional disease.  The  success rate of combination surgery ranges from 66-80%2,3.    References  1. Saadia STEWART. The Role of the Nose in Snoring and Obstructive Sleep Apnoea: An Update.  Eur Arch Otorhinolaryngol. 2011; 268: 1365-73.  2.  Yassine SM; Adelaida JA; Jai JR; Pallanch JF; Chris MB; Timothy SG; Josefina BAKER. Surgical modifications of the upper airway for obstructive sleep apnea in adults: a systematic review and meta-analysis. SLEEP 2010;33(10):3568-7454. Jaylon ALONSO. Hypopharyngeal surgery in obstructive sleep apnea: an evidence-based medicine review.  Arch Otolaryngol Head Neck Surg. 2006 Feb;132(2):206-13.  3. Hector YH1, Gibson Y, Josh AKANKSHA. The efficacy of anatomically based multilevel surgery for obstructive sleep apnea. Otolaryngol Head Neck Surg. 2003 Oct;129(4):327-35.  4. Jaylon ALONSO, Goldberg A. Hypopharyngeal Surgery in Obstructive Sleep Apnea: An Evidence-Based Medicine Review. Arch Otolaryngol Head Neck Surg. 2006 Feb;132(2):206-13.  5. Bernardao PJ et al. Upper-Airway Stimulation for Obstructive Sleep Apnea.  N Engl J Med. 2014 Jan 9;370(2):139-49.  6. Yifan Y et al. Increased Incidence of Cardiovascular Disease in Middle-aged Men with Obstructive Sleep Apnea. Am J Respir Crit Care Med; 2002 166: 159-165  7. Keene EM et al. Studying Life Effects and Effectiveness of Palatopharyngoplasty (SLEEP) study: Subjective Outcomes of Isolated Uvulopalatopharyngoplasty. Otolaryngol Head Neck Surg. 2011; 144: 623-631.            Your BMI is Body mass index is 51.81 kg/(m^2).  Weight management is a personal decision.  If you are interested in exploring weight loss strategies, the following discussion covers the approaches that may be successful. Body mass index (BMI) is one way to tell whether you are at a healthy weight, overweight, or obese. It measures your weight in relation to your height.  A BMI of 18.5 to 24.9 is in the healthy range. A person with a BMI of 25 to 29.9 is considered overweight, and someone with a BMI of 30 or  greater is considered obese. More than two-thirds of American adults are considered overweight or obese.  Being overweight or obese increases the risk for further weight gain. Excess weight may lead to heart disease and diabetes.  Creating and following plans for healthy eating and physical activity may help you improve your health.  Weight control is part of healthy lifestyle and includes exercise, emotional health, and healthy eating habits. Careful eating habits lifelong are the mainstay of weight control. Though there are significant health benefits from weight loss, long-term weight loss with diet alone may be very difficult to achieve- studies show long-term success with dietary management in less than 10% of people. Attaining a healthy weight may be especially difficult to achieve in those with severe obesity. In some cases, medications, devices and surgical management might be considered.  What can you do?  If you are overweight or obese and are interested in methods for weight loss, you should discuss this with your provider.     Consider reducing daily calorie intake by 500 calories.     Keep a food journal.     Avoiding skipping meals, consider cutting portions instead.    Diet combined with exercise helps maintain muscle while optimizing fat loss. Strength training is particularly important for building and maintaining muscle mass. Exercise helps reduce stress, increase energy, and improves fitness. Increasing exercise without diet control, however, may not burn enough calories to loose weight.       Start walking three days a week 10-20 minutes at a time    Work towards walking thirty minutes five days a week     Eventually, increase the speed of your walking for 1-2 minutes at time    In addition, we recommend that you review healthy lifestyles and methods for weight loss available through the National Institutes of Health patient information  sites:  http://win.niddk.nih.gov/publications/index.htm    And look into health and wellness programs that may be available through your health insurance provider, employer, local community center, or slick club.    Weight management plan: Patient was referred to their PCP to discuss a diet and exercise plan.              Follow-ups after your visit        Follow-up notes from your care team     Return in about 2 months (around 12/10/2018).      Your next 10 appointments already scheduled     Oct 15, 2018 10:40 AM CDT   Return Visit with Lety Buenrostro PsyD   [facility] ([facility])    [address],  [address]  [facility] [address]   [phone]            Oct 22, 2018 10:20 AM CDT   Return Visit with Matthias Sanchez MD   [facility] ([facility])    [address],  [address]  [facility] [address]   [phone]            Oct 30, 2018  9:00 AM CDT   (Arrive by 8:45 AM)   RETURN HEART FAILURE with Christian Willoughby MD   [facility] ([facility])    [address] [place] [address]  [address] [place] [id number] [phone]            Nov 05, 2018  8:00 PM CST   PSG Titration w/TCM with SLEEP STUDY RM 5   [facility])    [address] [place] [id number] [phone]            Nov 13, 2018 11:20 AM CST   (Arrive by 11:05 AM)   [facility]    [address] [place] [address]  4th Floor  [facility] [id number] [phone]            Nov 16, 2018  2:20 PM CST   Return Sleep Patient with Renuka Gordon MD   [facility])    [address] [place] [id number] [phone]              Future tests that were ordered for you  today     Open Future Orders        Priority Expected Expires Ordered    Comprehensive Sleep Study Routine  4/8/2019 10/10/2018    ABG-Blood Gas Arterial ([facility]) Routine  12/9/2018 10/10/2018            Who to contact     If you have questions or need follow up information about today's clinic visit or your schedule please contact [facility] directly at [phone].  Normal or non-critical lab and imaging results will be communicated to you by MyChart, letter or phone within 4 business days after the clinic has received the results. If you do not hear from us within 7 days, please contact the clinic through [employer] or phone. If you have a critical or abnormal lab result, we will notify you by phone as soon as possible.  Submit refill requests through [employer] or call your pharmacy and they will forward the refill request to us. Please allow 3 business days for your refill to be completed.          Additional Information About Your Visit        Care EveryWhere ID     This is your Care EveryWhere ID. This could be used by other organizations to access your [place] medical records  [id number]        Your Vitals Were     Pulse Respirations Height Pulse Oximetry BMI (Body Mass Index)       64 18 1.829 m (6') 98% 51.81 kg/m2        Blood Pressure from Last 3 Encounters:   10/10/18 (!) 79/49   08/28/18 135/74   07/18/18 98/61    Weight from Last 3 Encounters:   10/10/18 (!) 173.3 kg (382 lb)   09/07/18 (!) 167.8 kg (369 lb 14.4 oz)   08/28/18 (!) 167.8 kg (370 lb)              We Performed the Following     Comprehensive DME          Today's Medication Changes          These changes are accurate as of 10/10/18  1:52 PM.  If you have any questions, ask your nurse or doctor.               Start taking these medicines.        Dose/Directions    zolpidem 5 MG tablet   Commonly known as:  AMBIEN   Used for:  Chronic hypercapnic respiratory failure (H), Hypoventilation  associated with obesity syndrome (H), BiPAP (biphasic positive airway pressure) dependence   Started by:  Renuka Gordon MD        Take tablet by mouth 15 minutes prior to sleep, for Sleep Study   Quantity:  1 tablet   Refills:  0            Where to get your medicines      Some of these will need a paper prescription and others can be bought over the counter.  Ask your nurse if you have questions.     Bring a paper prescription for each of these medications     zolpidem 5 MG tablet                Primary Care Provider Office Phone # Fax #    Matthias Sanchez [phone]       [address] [address]        Equal Access to Services     [place] : Hadii kell andujar hadkem Sodillan, waaxda luoliviaadaha, qaybta kaalmada veronika, vasyl casarez . So [facility] [phone].    ATENCIÓN: Si habla español, tiene a kim disposición servicios gratuitos de asistencia lingüística. Llame al [phone].    We comply with applicable federal civil rights laws and [place] laws. We do not discriminate on the basis of race, color, national origin, age, disability, sex, sexual orientation, or gender identity.            Thank you!     Thank you for choosing [facility]  for your care. Our goal is always to provide you with excellent care. Hearing back from our patients is one way we can continue to improve our services. Please take a few minutes to complete the written survey that you may receive in the mail after your visit with us. Thank you!             Your Updated Medication List - Protect others around you: Learn how to safely use, store and throw away your medicines at www.disposemymeds.org.          This list is accurate as of 10/10/18  1:52 PM.  Always use your most recent med list.                   Brand Name Dispense Instructions for use Diagnosis    ammonium lactate 12 % cream    LAC-HYDRIN    385 g    Apply topically 2 times  daily as needed for dry skin    Dry skin, Lichen of skin       aspirin 81 MG tablet     30 tablet    Take 1 tablet (81 mg) by mouth daily    Essential hypertension       atorvastatin 40 MG tablet    LIPITOR    30 tablet    Take 1 tablet (40 mg) by mouth daily    Type 2 diabetes mellitus without complication, without long-term current use of insulin (H)       blood glucose monitoring lancets     100 each    Use to test blood sugars 2 times daily or as directed.    Diabetes mellitus, type 2 (H)       blood glucose monitoring meter device kit     1 kit    Use to test blood sugars 2 times daily or as directed.    Type 2 diabetes mellitus with hyperglycemia, without long-term current use of insulin (H)       blood glucose monitoring test strip    ONETOUCH ULTRA    100 strip    Use to test blood sugars 2 times daily or as directed.    Diabetes mellitus, type 2 (H)       levothyroxine 175 MCG tablet    SYNTHROID/LEVOTHROID    30 tablet    Take 1 tablet (175 mcg) by mouth daily    Hypothyroidism, unspecified type       lisinopril 5 MG tablet    PRINIVIL/ZESTRIL    60 tablet    Take 1 tablet (5 mg) by mouth daily Take in Pm    Chronic systolic congestive heart failure (H)       metFORMIN 500 MG tablet    GLUCOPHAGE    60 tablet    Take 1 tablet (500 mg) by mouth 2 times daily (with meals)    Type 2 diabetes mellitus without complication, without long-term current use of insulin (H)       metoprolol succinate 100 MG 24 hr tablet    TOPROL-XL    30 tablet    Take 1 tablet (100 mg) by mouth daily    Essential hypertension with goal blood pressure less than 140/90       multivitamin, therapeutic with minerals Tabs tablet     30 each    Take 1 tablet by mouth daily    Type 2 diabetes mellitus without complication, without long-term current use of insulin (H)       nystatin cream    MYCOSTATIN    30 g    Apply twice daily to affected area    Tinea pedis, unspecified laterality       omega 3 1000 MG Caps     30 capsule    Take 1 g  by mouth daily    Hyperlipidemia LDL goal <100       * order for DME     2 each    Equipment being ordered: Other: Velcro Compression stockings.  Treatment Diagnosis: bilateral lymphedema.    Lymphedema of extremity       * order for DME     1 Units    Equipment being ordered: Bariatric Lift chair Diagnosis - morbid obesity, CHF, Lymphedema  Fax to Ne from [employer] at [phone].    Chronic systolic congestive heart failure (H), Lymphedema, Morbid obesity (H)       polyethylene glycol Packet    MIRALAX/GLYCOLAX    15 packet    Take 17 g by mouth daily as needed for constipation    Constipation, unspecified constipation type       potassium chloride SA 20 MEQ CR tablet    K-DUR/KLOR-CON M    90 tablet    Take 1 tablet (20 mEq) by mouth 2 times daily    Hypokalemia       senna-docusate 8.6-50 MG per tablet    SENOKOT-S;PERICOLACE    60 tablet    Take 1-2 tablets by mouth 2 times daily as needed for constipation    Constipation, unspecified constipation type       spironolactone 25 MG tablet    ALDACTONE    30 tablet    Take 1 tablet (25 mg) by mouth daily    Chronic systolic congestive heart failure (H)       tamsulosin 0.4 MG capsule    FLOMAX    30 capsule    Take 1 capsule (0.4 mg) by mouth daily    Urinary retention       torsemide 100 MG tablet    DEMADEX    60 tablet    Take 1 tablet (100 mg) by mouth 2 times daily    (HFpEF) heart failure with preserved ejection fraction (H)       vitamin B complex with vitamin C Tabs tablet      Take 1 tablet by mouth daily        VITAMIN C PO           VITAMIN E COMPLEX PO           warfarin 5 MG tablet    COUMADIN    75 tablet    Take 2.5 tablets (12.5 mg) by mouth daily    Persistent atrial fibrillation (H)       zolpidem 5 MG tablet    AMBIEN    1 tablet    Take tablet by mouth 15 minutes prior to sleep, for Sleep Study    Chronic hypercapnic respiratory failure (H), Hypoventilation associated with obesity syndrome (H), BiPAP (biphasic positive airway pressure)  dependence       * Notice:  This list has 2 medication(s) that are the same as other medications prescribed for you. Read the directions carefully, and ask your doctor or other care provider to review them with you.

## 2018-10-10 NOTE — PATIENT INSTRUCTIONS
"Please get seen right away for your symptoms of lightheadedness,    Take diuretic earlier in the day    MY TREATMENT INFORMATION FOR SLEEP APNEA-  Clarke Moreira    DOCTOR : Renuka Gordon  SLEEP CENTER :      MY CONTACT NUMBER:     Am I having a sleep study at a sleep center?  Make sure you have an appointment for the study before you leave!    Am I having a home sleep study?  Watch this video:  https://www.W4.com/watch?v=CteI_[email or web address]&[email or web address]  Please verify your insurance coverage with your insurance carrier    Frequently asked questions:  1. What is Obstructive Sleep Apnea (THONG)? THONG is the most common type of sleep apnea. Apnea means, \"without breath.\"  Apnea is most often caused by narrowing or collapse of the upper airway as muscles relax during sleep.   Almost everyone has occasional apneas. Most people with sleep apnea have had brief interruptions at night frequently for many years.  The severity of sleep apnea is related to how frequent and severe the events are.   2. What are the consequences of THONG? Symptoms include: feeling sleepy during the day, snoring loudly, gasping or stopping of breathing, trouble sleeping, and occasionally morning headaches or heartburn at night.  Sleepiness can be serious and even increase the risk of falling asleep while driving. Other health consequences may include development of high blood pressure and other cardiovascular disease in persons who are susceptible. Untreated THONG  can contribute to heart disease, stroke and diabetes.   3. What are the treatment options? In most situations, sleep apnea is a lifelong disease that must be managed with daily therapy. Medications are not effective for sleep apnea and surgery is generally not considered until other therapies have been tried. Your treatment is your choice . Continuous Positive Airway (CPAP) works right away and is the therapy that is effective in nearly everyone. An oral " device to hold your jaw forward is usually the next most reliable option. Other options include postioning devices (to keep you off your back), weight loss, and surgery including a tongue pacing device. There is more detail about some of these options below.    Important tips for using CPAP and similar devices   Know your equipment:  CPAP is continuous positive airway pressure that prevents obstructive sleep apnea by keeping the throat from collapsing while you are sleeping. In most cases, the device is  smart  and can slowly self-adjusts if your throat collapses and keeps a record every day of how well you are treated-this information is available to you and your care team.  BPAP is bilevel positive airway pressure that keeps your throat open and also assists each breath with a pressure boost to maintain adequate breathing.  Special kinds of BPAP are used in patients who have inadequate breathing from lung or heart disease. In most cases, the device is  smart  and can slowly self-adjusts to assist breathing. Like CPAP, the device keeps a record of how well you are treated.  Your mask is your connection to the device. You get to choose what feels most comfortable and the staff will help to make sure if fits. Here: are some examples of the different masks that are available:       Key points to remember on your journey with sleep apnea:  1. Sleep study.  PAP devices often need to be adjusted during a sleep study to show that they are effective and adjusted right.  2. Good tips to remember: Try wearing just the mask during a quiet time during the day so your body adapts to wearing it. A humidifier is recommended for comfort in most cases to prevent drying of your nose and throat. Allergy medication from your provider may help you if you are having nasal congestion.  3. Getting settled-in. It takes more than one night for most of us to get used to wearing a mask. Try wearing just the mask during a quiet time during the  day so your body adapts to wearing it. A humidifier is recommended for comfort in most cases. Our team will work with you carefully on the first day and will be in contact within 4 days and again at 2 and 4 weeks for advice and remote device adjustments. Your therapy is evaluated by the device each day.   4. Use it every night. The more you are able to sleep naturally for 7-8 hours, the more likely you will have good sleep and to prevent health risks or symptoms from sleep apnea. Even if you use it 4 hours it helps. Occasionally all of us are unable to use a medical therapy, in sleep apnea, it is not dangerous to miss one night.   5. Communicate. Call our skilled team on the number provided on the first day if your visit for problems that make it difficult to wear the device. Over 2 out of 3 patients can learn to wear the device long-term with help from our team. Remember to call our team or your sleep providers if you are unable to wear the device as we may have other solutions for those who cannot adapt to mask CPAP therapy. It is recommended that you sleep your sleep provider within the first 3 months and yearly after that if you are not having problems.   Take care of your equipment. Make sure you clean your mask and tubing using directions every day and that your filter and mask are replaced as recommended or if they are not working.     BESIDES CPAP, WHAT OTHER THERAPIES ARE THERE?    Positioning Device  Positioning devices are generally used when sleep apnea is mild and only occurs on your back.This example shows a pillow that straps around the waist. It may be appropriate for those whose sleep study shows milder sleep apnea that occurs primarily when lying flat on one's back. Preliminary studies have shown benefit but effectiveness at home may need to be verified by a home sleep test. These devices are generally not covered by medical insurance.  Examples of devices that maintain sleeping on the back to  prevent snoring and mild sleep apnea.    Belt type body positioner  Http://PROFICIO.com/    Electronic reminder  Http://nightshifttherapy.com/  Http://[email or web address] [email or web address].au/    Oral Appliance  What is oral appliance therapy?  An oral appliance device fits on your teeth at night like a retainer used after having braces. The device is made by a specialized dentist and requires several visits over 1-2 months before a manufactured device is made to fit your teeth and is adjusted to prevent your sleep apnea. Once an oral device is working properly, snoring should be improved. A home sleep test may be recommended at that time if to determine whether the sleep apnea is adequately treated.       Some things to remember:  -Oral devices are often, but not always, covered by your medical insurance. Be sure to check with your insurance provider.   -If you are referred for oral therapy, you will be given a list of specialized dentists to consider or you may choose to visit the Web site of the American Academy of Dental Sleep Medicine  -Oral devices are less likely to work if you have severe sleep apnea or are extremely overweight.     More detailed information  An oral appliance is a small acrylic device that fits over the upper and lower teeth  (similar to a retainer or a mouth guard). This device slightly moves jaw forward, which moves the base of the tongue forward, opens the airway, improves breathing for effective treat snoring and obstructive sleep apnea in perhaps 7 out of 10 people .  The best working devices are custom-made by a dental device  after a mold is made of the teeth 1, 2, 3.  When is an oral appliance indicated?  Oral appliance therapy is recommended as a first-line treatment for patients with primary snoring, mild sleep apnea, and for patients with moderate sleep apnea who prefer appliance therapy to use of CPAP4, 5. Severity of sleep apnea is determined by sleep testing and is based on the number  of respiratory events per hour of sleep.   How successful is oral appliance therapy?  The success rate of oral appliance therapy in patients with mild sleep apnea is 75-80% while in patients with moderate sleep apnea it is 50-70%. The chance of success in patients with severe sleep apnea is 40-50%. The research also shows that oral appliances have a beneficial effect on the cardiovascular health of THONG patients at the same magnitude as CPAP therapy7.  Oral appliances should be a second-line treatment in cases of severe sleep apnea, but if not completely successful then a combination therapy utilizing CPAP plus oral appliance therapy may be effective. Oral appliances tend to be effective in a broad range of patients although studies show that the patients who have the highest success are females, younger patients, those with milder disease, and less severe obesity. 3, 6.   Finding a dentist that practices dental sleep medicine  Specific training is available through the American Academy of Dental Sleep Medicine for dentists interested in working in the field of sleep. To find a dentist who is educated in the field of sleep and the use of oral appliances, near you, visit the Web site of the American Academy of Dental Sleep Medicine.    References  1. Fozia et al. Objectively measured vs self-reported compliance during oral appliance therapy for sleep-disordered breathing. Chest 2013; 144(5): 5212-4141.  2. Nahum et al. Objective measurement of compliance during oral appliance therapy for sleep-disordered breathing. Thorax 2013; 68(1): 91-96.  3. Fina, et al. Mandibular advancement devices in 620 men and women with THONG and snoring: tolerability and predictors of treatment success. Chest 2004; 125: 8506-9458.  4. Yumiko, et al. Oral appliances for snoring and THONG: a review. Sleep 2006; 29: 244-262.  5. Jatinder et al. Oral appliance treatment for THONG: an update. J Clin Sleep Med 2014; 10(2):  215-227.  6. hamida Henry al. Predictors of OSAH treatment outcome. J Dent Res 2007; 86: 2607-6971.          Surgery:    Surgery for obstructive sleep apnea is considered generally only when other therapies fail to work. Surgery may be discussed with you if you are having a difficult time tolerating CPAP and or when there is an abnormal structure that requires surgical correction.  Nose and throat surgeries often enlarge the airway to prevent collapse.  Most of these surgeries create pain for 1-2 weeks and up to half of the most common surgeries are not effective throughout life.  You should carefully discuss the benefits and drawbacks to surgery with your sleep provider and surgeon to determine if it is the best solution for you.   More information  Surgery for THONG is directed at areas that are responsible for narrowing or complete obstruction of the airway during sleep.  There are a wide range of procedures available to enlarge and/or stabilize the airway to prevent blockage of breathing in the three major areas where it can occur: the palate, tongue, and nasal regions.  Successful surgical treatment depends on the accurate identification of the factors responsible for obstructive sleep apnea in each person.  A personalized approach is required because there is no single treatment that works well for everyone.  Because of anatomic variation, consultation with an examination by a sleep surgeon is a critical first step in determining what surgical options are best for each patient.  In some cases, examination during sedation may be recommended in order to guide the selection of procedures.  Patients will be counseled about risks and benefits as well as the typical recovery course after surgery. Surgery is typically not a cure for a person s THONG.  However, surgery will often significantly improve one s THONG severity (termed  success rate ).  Even in the absence of a cure, surgery will decrease the cardiovascular risk  associated with OSA7; improve overall quality of life8 (sleepiness, functionality, sleep quality, etc).      Palate Procedures:  Patients with THONG often have narrowing of their airway in the region of their tonsils and uvula.  The goals of palate procedures are to widen the airway in this region as well as to help the tissues resist collapse.  Modern palate procedure techniques focus on tissue conservation and soft tissue rearrangement, rather than tissue removal.  Often the uvula is preserved in this procedure. Residual sleep apnea is common in patient after pharyngoplasty with an average reduction in sleep apnea events of 33%2.      Tongue Procedures:  ExamWhile patients are awake, the muscles that surround the throat are active and keep this region open for breathing. These muscles relax during sleep, allowing the tongue and other structures to collapse and block breathing.  There are several different tongue procedures available.  Selection of a tongue base procedure depends on characteristics seen on physical exam.  Generally, procedures are aimed at removing bulky tissues in this area or preventing the back of the tongue from falling back during sleep.  Success rates for tongue surgery range from 50-62%3.    Hypoglossal Nerve Stimulation:  Hypoglossal nerve stimulation has recently received approval from the [place] Food and Drug Administration for the treatment of obstructive sleep apnea.  This is based on research showing that the system was safe and effective in treating sleep apnea6.  Results showed that the median AHI score decreased 68%, from 29.3 to 9.0. This therapy uses an implant system that senses breathing patterns and delivers mild stimulation to airway muscles, which keeps the airway open during sleep.  The system consists of three fully implanted components: a small generator (similar in size to a pacemaker), a breathing sensor, and a stimulation lead.  Using a small handheld remote, a  patient turns the therapy on before bed and off upon awakening.    Candidates for this device must be greater than 22 years of age, have moderate to severe THONG (AHI between 20-65), BMI less than 32, have tried CPAP/oral appliance without success, and have appropriate upper airway anatomy (determined by a sleep endoscopy performed by Dr. Mckinley).    Hypoglossal Nerve Stimulation Pathway:    The sleep surgeon s office will work with the patient through the insurance prior-authorization process (including communications and appeals).    Nasal Procedures:  Nasal obstruction can interfere with nasal breathing during the day and night.  Studies have shown that relief of nasal obstruction can improve the ability of some patients to tolerate positive airway pressure therapy for obstructive sleep apnea1.  Treatment options include medications such as nasal saline, topical corticosteroid and antihistamine sprays, and oral medications such as antihistamines or decongestants. Non-surgical treatments can include external nasal dilators for selected patients. If these are not successful by themselves, surgery can improve the nasal airway either alone or in combination with these other options.      Combination Procedures:  Combination of surgical procedures and other treatments may be recommended, particularly if patients have more than one area of narrowing or persistent positional disease.  The success rate of combination surgery ranges from 66-80%2,3.    References  1. Saadia STEWART. The Role of the Nose in Snoring and Obstructive Sleep Apnoea: An Update.  Eur Arch Otorhinolaryngol. 2011; 268: 1365-73.  2.  Yassine SM; Adelaida JA; Jai JR; Pallanch JF; Chris MB; Timothy SG; Josefina BAKER. Surgical modifications of the upper airway for obstructive sleep apnea in adults: a systematic review and meta-analysis. SLEEP 2010;33(10):4706-1625. Jaylon ALONSO. Hypopharyngeal surgery in obstructive sleep apnea: an evidence-based medicine review.   Arch Otolaryngol Head Neck Surg. 2006 Feb;132(2):206-13.  3. Hector YH1, Gibson Y, Josh AKANKSHA. The efficacy of anatomically based multilevel surgery for obstructive sleep apnea. Otolaryngol Head Neck Surg. 2003 Oct;129(4):327-35.  4. Kezirian E, Goldberg A. Hypopharyngeal Surgery in Obstructive Sleep Apnea: An Evidence-Based Medicine Review. Arch Otolaryngol Head Neck Surg. 2006 Feb;132(2):206-13.  5. Xin BURGOS et al. Upper-Airway Stimulation for Obstructive Sleep Apnea.  N Engl J Med. 2014 Jan 9;370(2):139-49.  6. Yifan Y et al. Increased Incidence of Cardiovascular Disease in Middle-aged Men with Obstructive Sleep Apnea. Am J Respir Crit Care Med; 2002 166: 159-165  7. Jassi BONNER et al. Studying Life Effects and Effectiveness of Palatopharyngoplasty (SLEEP) study: Subjective Outcomes of Isolated Uvulopalatopharyngoplasty. Otolaryngol Head Neck Surg. 2011; 144: 623-631.            Your BMI is Body mass index is 51.81 kg/(m^2).  Weight management is a personal decision.  If you are interested in exploring weight loss strategies, the following discussion covers the approaches that may be successful. Body mass index (BMI) is one way to tell whether you are at a healthy weight, overweight, or obese. It measures your weight in relation to your height.  A BMI of 18.5 to 24.9 is in the healthy range. A person with a BMI of 25 to 29.9 is considered overweight, and someone with a BMI of 30 or greater is considered obese. More than two-thirds of American adults are considered overweight or obese.  Being overweight or obese increases the risk for further weight gain. Excess weight may lead to heart disease and diabetes.  Creating and following plans for healthy eating and physical activity may help you improve your health.  Weight control is part of healthy lifestyle and includes exercise, emotional health, and healthy eating habits. Careful eating habits lifelong are the mainstay of weight control. Though there are significant  health benefits from weight loss, long-term weight loss with diet alone may be very difficult to achieve- studies show long-term success with dietary management in less than 10% of people. Attaining a healthy weight may be especially difficult to achieve in those with severe obesity. In some cases, medications, devices and surgical management might be considered.  What can you do?  If you are overweight or obese and are interested in methods for weight loss, you should discuss this with your provider.     Consider reducing daily calorie intake by 500 calories.     Keep a food journal.     Avoiding skipping meals, consider cutting portions instead.    Diet combined with exercise helps maintain muscle while optimizing fat loss. Strength training is particularly important for building and maintaining muscle mass. Exercise helps reduce stress, increase energy, and improves fitness. Increasing exercise without diet control, however, may not burn enough calories to loose weight.       Start walking three days a week 10-20 minutes at a time    Work towards walking thirty minutes five days a week     Eventually, increase the speed of your walking for 1-2 minutes at time    In addition, we recommend that you review healthy lifestyles and methods for weight loss available through the National Institutes of Health patient information sites:  http://win.niddk.nih.gov/publications/index.htm    And look into health and wellness programs that may be available through your health insurance provider, employer, local community center, or slick club.    Weight management plan: Patient was referred to their PCP to discuss a diet and exercise plan.

## 2018-10-10 NOTE — PROGRESS NOTES
Chief complaint: Establish care for bilevel, needs supplies    History of Present Illness: 68-year-old gentleman who was prescribed bilevel ST in the hospital for chronic respiratory behavior.  He has been on it since that time and reports excellent clinical benefit.  He has noticed improvement in lower extremity edema as well as shortness of breath and sleep quality.  He has never had a sleep study however.  He does not know if he snoring through the mask.    Notably today when he came to clinic he did complain of some lightheadedness and visual disturbance blood pressures were low.  He had repeated checks during the visit and his symptoms resolved final blood pressure before leaving clinic was normal.  During the clinic visit I had recommended that he be seen in the emergency room.  He declined.  He denied chest pain or shortness of breath nor palpitations.  He had some cervical neck pain which she described as being present in the trapezius muscle.  He denies any problems with cellulitis.  He keeps his legs wrapped for edema.    At home he sleeps in a hospital bed with the head of the bed elevated about 15 degrees.  He uses a fullface mask with the bilevel.  He does not take any sleep aids nor does he drink any alcohol.  He drinks about 1 caffeinated beverage a day.  He does not use any tobacco products.  He admits to an erratic sleep schedule.  Primarily his sleep is disrupted by the need to use the bathroom.  He does take diuretics and he has had difficulty taking them earlier in the day.  Sometimes he goes to bed as late as 2 AM.  He thinks he needs about 9 hours to  feel rested.  In the past he had a more regular sleep schedule when he worked at which was about midnight to 6 AM.  He is not currently working and he rarely leaves the house.  He denies symptoms of restlessness.  He does suffer from neuropathy.  He denies dream enactment behavior he uses supplemental oxygen at home but he does not have a very  portable system.  He uses 2 L supplemental oxygen into the bilevel at home as well.    [place] Seepiness Scale:10 (Less than 10 normal)    Past Medical History:   Diagnosis Date     Atrial fibrillation (H)      Basal cell carcinoma      Chronic anticoagulation      Diastolic heart failure (H)      Dyslipidemia      Essential hypertension      Morbid obesity (H)      Sleep-disordered breathing      Type 2 diabetes mellitus (H)        No Known Allergies    Current Outpatient Prescriptions   Medication     ammonium lactate (LAC-HYDRIN) 12 % cream     Ascorbic Acid (VITAMIN C PO)     aspirin 81 MG tablet     atorvastatin (LIPITOR) 40 MG tablet     blood glucose monitoring (ONE TOUCH DELICA) lancets     blood glucose monitoring (ONE TOUCH ULTRA MINI) meter device kit     blood glucose monitoring (ONETOUCH ULTRA) test strip     levothyroxine (SYNTHROID/LEVOTHROID) 175 MCG tablet     lisinopril (PRINIVIL/ZESTRIL) 5 MG tablet     metFORMIN (GLUCOPHAGE) 500 MG tablet     metoprolol (TOPROL-XL) 100 MG 24 hr tablet     multivitamin, therapeutic with minerals (MULTI-VITAMIN) TABS tablet     nystatin (MYCOSTATIN) cream     omega 3 1000 MG CAPS     order for DME     order for DME     polyethylene glycol (MIRALAX/GLYCOLAX) Packet     potassium chloride SA (K-DUR/KLOR-CON M) 20 MEQ CR tablet     senna-docusate (SENOKOT-S;PERICOLACE) 8.6-50 MG per tablet     spironolactone (ALDACTONE) 25 MG tablet     tamsulosin (FLOMAX) 0.4 MG capsule     torsemide (DEMADEX) 100 MG tablet     vitamin B complex with vitamin C (VITAMIN  B COMPLEX) TABS tablet     VITAMIN E COMPLEX PO     warfarin (COUMADIN) 5 MG tablet     zolpidem (AMBIEN) 5 MG tablet     No current facility-administered medications for this visit.        Social History     Social History     Marital status: Single     Spouse name: N/A     Number of children: N/A     Years of education: N/A     Occupational History     Not on file.     Social History Main Topics     Smoking status:  Never Smoker     Smokeless tobacco: Never Used     Alcohol use No      Comment: Former alcohol abuse. Quit 70s then quit later in 80s-present     Drug use: No      Comment: history of LSD use     Sexual activity: Not on file     Other Topics Concern     Not on file     Social History Narrative    Lives in an apartment in 16th floor near hospital.  Has elevators, unable to use stairs. Not able to shop; has things delivered to him including food. Difficulty completing cleaning. Goes to PCP once yearly for annual check up and medication review.       Family History   Problem Relation Age of Onset     Depression Mother      Glaucoma Maternal Grandfather      Cancer No family hx of      No family history of skin cancer     Macular Degeneration No family hx of        Review of Systems: Positve for rare chest pains, history of palpitations, depression and anxiety, occasional shortness of breath and cough, and per HPI, otherwise comprehensive review of systems is negative.    EXAM: Obese gentleman with a wrapped lower extremities and wearing sunglasses, alert and in no distress  /65  Pulse 64  Resp 18  Ht 1.829 m (6')  Wt (!) 173.3 kg (382 lb)  SpO2 98%  BMI 51.81 kg/m2  HEENT: Normocephalic/atraumatic, pupils are equal, reactive to light, no scleral icterus  Oropharynx: Mallampati 3  Neck supple no lymphadenopathy, neck circumference 46 inches  Chest: Clear to auscultation bilaterally, no rales or wheezes  Cardiac: Regular rate and rhythm, S1-S2 normal  Abdomen: Obese, soft and nontender, bowel sounds present  Extremities: Warm, well perfused, wrist pulses palpable and equal bilaterally  Psychiatric: Mood and affect appear normal  Neurologic: No gross focal deficits    Arterial blood gas testing in September 2017:  normal pH of 7.41  PCO2 70  PO2 67    Echo in May 2018 with ejection fraction of 50-60% global right ventricular function mildly reduced    PAP download from 9/10/2018 to 10/9/2018 reviewed:  Per  cent of days used greater than 4 Hours 83 % (minimum goal greater than 70%)  Average use on days used: 9 hours 13 min  Settings:  EPAP 5 cmH2O    IPAP 12 cmH2O respiratory rate 16  Average AHI 11.9 events per hour (goal less than 5)  Leak acceptable    ASSESSMENT: 68-year-old gentleman with morbid obesity chronic respiratory failure currently on bilevel ST getting excellent clinical benefit however may be getting suboptimal treatment.  Download suggests elevated AHI which may be reflecting some untreated obstructive sleep apnea.  Patient has never had a sleep study he may benefit from in lab titration.  Patient's symptoms of lightheadedness improved during the visit today he again was encouraged to be evaluated in the emergency room.    PLAN: Prescription generated to update his supplies.  Extensive discussion regarding the importance of optimizing his sleep disordered breathing.  Recommended in lab polysomnography with a 1-2-hour baseline.  The remainder of the time should be spent titrating bilevel to optimize treatment of obstructive apneas should they be present and to optimize CO2 levels to bring them close to baseline wake levels.  Arterial blood gas testing should be done prior to falling asleep transcutaneous CO2 monitoring should be used during the study.  Supplemental oxygen again may need to be added.  I will be trying to contact patient with the results when they become available.  He should then follow-up with me a few weeks after sleeping with the new settings.  Strongly encouraged to try to regularize his sleep schedule in the meantime.  Also encouraged to take his second dose of diuretic much earlier to minimize its effect on sleep.  Please see patient instructions for further details of counseling provided he is agreeable with this plan.    Renuka Gordon M.D.  Pulmonary/Critical Care/Sleep Medicine    The above note was dictated using voice recognition software and may include typographical  errors. Please contact the author for any clarifications.

## 2018-10-11 NOTE — NURSING NOTE
[facility].    Guera Chong [facility] ~[place]

## 2018-10-15 ENCOUNTER — OFFICE VISIT (OUTPATIENT)
Dept: PSYCHOLOGY | Facility: CLINIC | Age: 68
End: 2018-10-15
Payer: COMMERCIAL

## 2018-10-15 DIAGNOSIS — F41.1 GAD (GENERALIZED ANXIETY DISORDER): ICD-10-CM

## 2018-10-15 DIAGNOSIS — F33.1 MODERATE EPISODE OF RECURRENT MAJOR DEPRESSIVE DISORDER (H): Primary | ICD-10-CM

## 2018-10-15 NOTE — MR AVS SNAPSHOT
After Visit Summary   10/15/2018    Clarke Moreira    MRN: [id number]           Patient Information     Date Of Birth          1950        Visit Information        Provider Department      10/15/2018 10:40 AM Lety Buenrostro PsyD [facility]        Today's Diagnoses     Moderate episode of recurrent major depressive disorder (H)    -  1    CHRIS (generalized anxiety disorder)           Follow-ups after your visit        Your next 10 appointments already scheduled     Oct 22, 2018 10:20 AM CDT   Return Visit with MD [facility] ([facility])    [address] [place] [address],  [address]  [facility] [address]   [phone]            Oct 29, 2018 10:40 AM CDT   Return Visit with [facility] ([facility])    [address] [place] [address],  [address]  [facility] [address]   [phone]            Oct 30, 2018  9:00 AM CDT   (Arrive by 8:45 AM)   RETURN HEART FAILURE with Christian Willoughby MD   [facility] ([place] [facility])    [address] [place] [id number] [phone]            Nov 05, 2018  8:00 PM CST   PSG Titration w/TCM with SLEEP STUDY  5   [facility] ([facility])    [address] [place] [id number] [phone]            Nov 12, 2018 10:40 AM CST   Return Visit with [facility] ([facility])    [address] [place] [address],  [address]  [facility] [address]   [phone]            Nov 13, 2018 11:20 AM CST   (Arrive by 11:05 AM)   [facility] [facility]    [address] [place] [address]  [address]th [facility] [id number] [phone]            Nov 16, 2018  2:20 PM CST   Return Sleep Patient  with Renuka Gordon MD   [facility] ([facility])    [address] [place] [id number] [phone]            2018 10:40 AM CST   Return Visit with [facility] ([facility])    [address],  [address]  [place] [id number]   [phone]              Who to contact     Please call your clinic at [phone] to:    Ask questions about your health    Make or cancel appointments    Discuss your medicines    Learn about your test results    Speak to your doctor            Additional Information About Your Visit        MyChart Information     [employer] is an electronic gateway that provides easy, online access to your medical records. With [facility] EDI, you can request a clinic appointment, read your test results, renew a prescription or communicate with your care team.     To sign up for [employer] visit the website at [email or web address]   You will be asked to enter the access code listed below, as well as some personal information. Please follow the directions to create your username and password.     Your access code is: [email or web address]  Expires: 1/15/2019  1:54 PM     Your access code will  in 90 days. If you need help or a new code, please contact your [facility] or call [phone] for assistance.        Care EveryWhere ID     This is your Care EveryWhere ID. This could be used by other organizations to access your [facility] medical records  [id number]         Blood Pressure from Last 3 Encounters:   10/10/18 101/65   18 135/74   18 98/61    Weight from Last 3 Encounters:   10/10/18 (!) 382 lb (173.3 kg)   18 (!) 369 lb 14.4 oz (167.8 kg)   18 (!) 370 lb (167.8 kg)              Today, you had the following     No orders found for display       Primary Care Provider Office Phone # Fax #    Matthias CORNELIUS  MD Laura [phone]       [address] [address]        Equal Access to Services     TYLER [place] : Hadii aad ku hadraghulaly Cotton, shelton gutierrez, luisallyson navatisha banda, vasyl mario laNixonangelito pa. So [facility] [phone].    ATENCIÓN: Si habla español, tiene a kim disposición servicios gratuitos de asistencia lingüística. Llame al [phone].    We comply with applicable federal civil rights laws and [place] laws. We do not discriminate on the basis of race, color, national origin, age, disability, sex, sexual orientation, or gender identity.            Thank you!     Thank you for choosing \[facility]\"" [facility]  for your care. Our goal is always to provide you with excellent care. Hearing back from our patients is one way we can continue to improve our services. Please take a few minutes to complete the written survey that you may receive in the mail after your visit with us. Thank you!             Your Updated Medication List - Protect others around you: Learn how to safely use, store and throw away your medicines at www.disposemymeds.org.          This list is accurate as of 10/15/18 11:59 PM.  Always use your most recent med list.                   Brand Name Dispense Instructions for use Diagnosis    ammonium lactate 12 % cream    LAC-HYDRIN    385 g    Apply topically 2 times daily as needed for dry skin    Dry skin, Lichen of skin       aspirin 81 MG tablet     30 tablet    Take 1 tablet (81 mg) by mouth daily    Essential hypertension       atorvastatin 40 MG tablet    LIPITOR    30 tablet    Take 1 tablet (40 mg) by mouth daily    Type 2 diabetes mellitus without complication, without long-term current use of insulin (H)       blood glucose monitoring lancets     100 each    Use to test blood sugars 2 times daily or as directed.    Diabetes mellitus, type 2 (H)       blood glucose monitoring meter device kit     1 kit    Use to test  blood sugars 2 times daily or as directed.    Type 2 diabetes mellitus with hyperglycemia, without long-term current use of insulin (H)       blood glucose monitoring test strip    ONETOUCH ULTRA    100 strip    Use to test blood sugars 2 times daily or as directed.    Diabetes mellitus, type 2 (H)       levothyroxine 175 MCG tablet    SYNTHROID/LEVOTHROID    30 tablet    Take 1 tablet (175 mcg) by mouth daily    Hypothyroidism, unspecified type       lisinopril 5 MG tablet    PRINIVIL/ZESTRIL    60 tablet    Take 1 tablet (5 mg) by mouth daily Take in Pm    Chronic systolic congestive heart failure (H)       metFORMIN 500 MG tablet    GLUCOPHAGE    60 tablet    Take 1 tablet (500 mg) by mouth 2 times daily (with meals)    Type 2 diabetes mellitus without complication, without long-term current use of insulin (H)       metoprolol succinate 100 MG 24 hr tablet    TOPROL-XL    30 tablet    Take 1 tablet (100 mg) by mouth daily    Essential hypertension with goal blood pressure less than 140/90       multivitamin, therapeutic with minerals Tabs tablet     30 each    Take 1 tablet by mouth daily    Type 2 diabetes mellitus without complication, without long-term current use of insulin (H)       nystatin cream    MYCOSTATIN    30 g    Apply twice daily to affected area    Tinea pedis, unspecified laterality       omega 3 1000 MG Caps     30 capsule    Take 1 g by mouth daily    Hyperlipidemia LDL goal <100       * order for DME     2 each    Equipment being ordered: Other: Velcro Compression stockings.  Treatment Diagnosis: bilateral lymphedema.    Lymphedema of extremity       * order for DME     1 Units    Equipment being ordered: Bariatric Lift chair Diagnosis - morbid obesity, CHF, Lymphedema  Fax to Ne from [email or web address] at [phone].    Chronic systolic congestive heart failure (H), Lymphedema, Morbid obesity (H)       polyethylene glycol Packet    MIRALAX/GLYCOLAX    15 packet    Take 17 g by mouth daily as  needed for constipation    Constipation, unspecified constipation type       potassium chloride SA 20 MEQ CR tablet    K-DUR/KLOR-CON M    90 tablet    Take 1 tablet (20 mEq) by mouth 2 times daily    Hypokalemia       senna-docusate 8.6-50 MG per tablet    SENOKOT-S;PERICOLACE    60 tablet    Take 1-2 tablets by mouth 2 times daily as needed for constipation    Constipation, unspecified constipation type       spironolactone 25 MG tablet    ALDACTONE    30 tablet    Take 1 tablet (25 mg) by mouth daily    Chronic systolic congestive heart failure (H)       tamsulosin 0.4 MG capsule    FLOMAX    30 capsule    Take 1 capsule (0.4 mg) by mouth daily    Urinary retention       torsemide 100 MG tablet    DEMADEX    60 tablet    Take 1 tablet (100 mg) by mouth 2 times daily    (HFpEF) heart failure with preserved ejection fraction (H)       vitamin B complex with vitamin C Tabs tablet      Take 1 tablet by mouth daily        VITAMIN C PO           VITAMIN E COMPLEX PO           warfarin 5 MG tablet    COUMADIN    75 tablet    Take 2.5 tablets (12.5 mg) by mouth daily    Persistent atrial fibrillation (H)       zolpidem 5 MG tablet    AMBIEN    1 tablet    Take tablet by mouth 15 minutes prior to sleep, for Sleep Study    Chronic hypercapnic respiratory failure (H), Hypoventilation associated with obesity syndrome (H), BiPAP (biphasic positive airway pressure) dependence       * Notice:  This list has 2 medication(s) that are the same as other medications prescribed for you. Read the directions carefully, and ask your doctor or other care provider to review them with you.
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
## 2018-10-22 ENCOUNTER — TELEPHONE (OUTPATIENT)
Dept: FAMILY MEDICINE | Facility: CLINIC | Age: 68
End: 2018-10-22

## 2018-10-22 ENCOUNTER — OFFICE VISIT (OUTPATIENT)
Dept: FAMILY MEDICINE | Facility: CLINIC | Age: 68
End: 2018-10-22
Payer: COMMERCIAL

## 2018-10-22 ENCOUNTER — PATIENT OUTREACH (OUTPATIENT)
Dept: FAMILY MEDICINE | Facility: CLINIC | Age: 68
End: 2018-10-22

## 2018-10-22 VITALS
BODY MASS INDEX: 52.49 KG/M2 | TEMPERATURE: 98.8 F | WEIGHT: 315 LBS | OXYGEN SATURATION: 98 % | SYSTOLIC BLOOD PRESSURE: 102 MMHG | DIASTOLIC BLOOD PRESSURE: 67 MMHG | HEART RATE: 92 BPM

## 2018-10-22 DIAGNOSIS — I48.20 CHRONIC ATRIAL FIBRILLATION (H): ICD-10-CM

## 2018-10-22 DIAGNOSIS — R55 PRE-SYNCOPE: ICD-10-CM

## 2018-10-22 DIAGNOSIS — F41.8 DEPRESSION WITH ANXIETY: ICD-10-CM

## 2018-10-22 DIAGNOSIS — I10 ESSENTIAL HYPERTENSION WITH GOAL BLOOD PRESSURE LESS THAN 140/90: ICD-10-CM

## 2018-10-22 DIAGNOSIS — E11.65 TYPE 2 DIABETES MELLITUS WITH HYPERGLYCEMIA, WITHOUT LONG-TERM CURRENT USE OF INSULIN (H): ICD-10-CM

## 2018-10-22 DIAGNOSIS — I50.30 (HFPEF) HEART FAILURE WITH PRESERVED EJECTION FRACTION (H): Primary | ICD-10-CM

## 2018-10-22 DIAGNOSIS — I89.0 LYMPHEDEMA: ICD-10-CM

## 2018-10-22 LAB
BUN SERPL-MCNC: 23.6 MG/DL (ref 7–21)
CALCIUM SERPL-MCNC: 9.6 MG/DL (ref 8.5–10.1)
CHLORIDE SERPLBLD-SCNC: 98.2 MMOL/L (ref 98–110)
CO2 SERPL-SCNC: 29 MMOL/L (ref 20–32)
CREAT SERPL-MCNC: 1.3 MG/DL (ref 0.7–1.3)
GFR SERPL CREATININE-BSD FRML MDRD: 58.3 ML/MIN/1.7 M2
GLUCOSE SERPL-MCNC: 114.1 MG'DL (ref 70–99)
INR PPP: 3.5
NT-PROBNP SERPL-MCNC: 1091 PG/ML (ref 0–125)
POTASSIUM SERPL-SCNC: 4 MMOL/DL (ref 3.3–4.5)
SODIUM SERPL-SCNC: 133.5 MMOL/L (ref 132.6–141.4)

## 2018-10-22 ASSESSMENT — ANXIETY QUESTIONNAIRES
7. FEELING AFRAID AS IF SOMETHING AWFUL MIGHT HAPPEN: NEARLY EVERY DAY
GAD7 TOTAL SCORE: 13
IF YOU CHECKED OFF ANY PROBLEMS ON THIS QUESTIONNAIRE, HOW DIFFICULT HAVE THESE PROBLEMS MADE IT FOR YOU TO DO YOUR WORK, TAKE CARE OF THINGS AT HOME, OR GET ALONG WITH OTHER PEOPLE: VERY DIFFICULT
1. FEELING NERVOUS, ANXIOUS, OR ON EDGE: MORE THAN HALF THE DAYS
5. BEING SO RESTLESS THAT IT IS HARD TO SIT STILL: NOT AT ALL
3. WORRYING TOO MUCH ABOUT DIFFERENT THINGS: MORE THAN HALF THE DAYS
2. NOT BEING ABLE TO STOP OR CONTROL WORRYING: MORE THAN HALF THE DAYS
6. BECOMING EASILY ANNOYED OR IRRITABLE: MORE THAN HALF THE DAYS

## 2018-10-22 ASSESSMENT — PATIENT HEALTH QUESTIONNAIRE - PHQ9: 5. POOR APPETITE OR OVEREATING: MORE THAN HALF THE DAYS

## 2018-10-22 NOTE — PROGRESS NOTES
HPI       Clarke Moreira is a 68 year old  who presents for   Chief Complaint   Patient presents with     RECHECK     Diabetes follow up         Concern: Follow up    1. CHF   -wt up 17lbs  Fluid  Pannus bigger  Eating a lot - CHO / calories / limited exercise  -  -  2. Diabetic ulcer  -no longer has an ulcer  -  -  3. A-fib / anticoagulation  -  -  4. Depression/PTSD/Anxiety  -Starting to see Dr Buenrostro  -reduction of services    5. Sleep apnea  -Patient is using cpap and it works great. Is able to take deep breaths  -    6. DM  -Well manged 112-130 morning fasting numbers  -   7. HTN / pre-syncope  -low blood pressure  -episode again - vision changes with colors at perimeter of vision    -  8. Lymphedema  -legs are good  Pannus getting bigger again  -  9. HypoK  resolved             +++++++      Problem, Medication and Allergy Lists were reviewed and updated if needed..  Patient is an established patient of this clinic..         Review of Systems:   Review of Systems  Negative ROS except as noted above in HPI         Physical Exam:     Vitals:    10/22/18 1030 10/22/18 1031   BP: (!) 88/58 102/67   Pulse: 92    Temp: 98.8  F (37.1  C)    TempSrc: Oral    SpO2: 98%    Weight: (!) 387 lb (175.5 kg)      Body mass index is 52.49 kg/(m^2).    Wt Readings from Last 5 Encounters:   10/22/18 (!) 387 lb (175.5 kg)   10/10/18 (!) 382 lb (173.3 kg)   09/07/18 (!) 369 lb 14.4 oz (167.8 kg)   08/28/18 (!) 370 lb (167.8 kg)   07/18/18 (!) 375 lb (170.1 kg)        Physical Exam  Gen: no distress  Lungs: clear  Cor: irreg rhytym, no S3 S4 murmur or rub  Abd: soft, nontender, no HSM  Ext: wrapped          Results:     Results for orders placed or performed in visit on 10/22/18   Basic Metabolic Panel ([facility])   Result Value Ref Range    Urea Nitrogen 23.6 (H) 7.0 - 21.0 mg/dL    Calcium 9.6 8.5 - 10.1 mg/dL    Chloride 98.2 98.0 - 110.0 mmol/L    Carbon Dioxide 29.0 20.0 - 32.0 mmol/L    Creatinine 1.3 0.7 - 1.3  mg/dL    Glucose 114.1 (H) 70.0 - 99.0 mg'dL    Potassium 4.0 3.3 - 4.5 mmol/dL    Sodium 133.5 132.6 - 141.4 mmol/L    GFR Estimate 58.3 (L) >60.0 mL/min/1.7 m2    GFR Estimate If Black 70.6 >60.0 mL/min/1.7 m2   INR ([facility])   Result Value Ref Range    INR 3.5          Assessment and Plan        1. (HFpEF) heart failure with preserved ejection fraction (H)  Wt increased 18#, Low BP with symptoms.  Torsemide 100mg BID (prevously 50mg bid)  Spironolactone 25 mg every day (previously 50mg)  Lisinopril 5mg every day (previously 10mg)    Will be seen in [facility] on Oct 30  Will await recommendations re: diuresis vs low BP    - N terminal pro BNP outpatient    2. Pre-syncope  Low BP with visual symptoms again      3. Essential hypertension with goal blood pressure less than 140/90  Stable BMP    - Basic Metabolic Panel ([facility])    4. Lymphedema  Legs looking great    5. Chronic atrial fibrillation (H)  INR 3.5.   - INR ([facility])    6. Depression with anxiety  Continue working with Dr Buenrostro and our [facility] team  Pt worried about losing contact from resources    7. Type 2 diabetes mellitus with hyperglycemia, without long-term current use of insulin (H)  stable  - Albumin Random Urine Quantitative with Creat Ratio    8. RTC 1 month  BNP, BMP  Check pre-syncope, wt             Options for treatment and follow-up care were reviewed with the patient. Clarke Moreira  engaged in the decision making process and verbalized understanding of the options discussed and agreed with the final plan.    Matthias Sanchez MD

## 2018-10-22 NOTE — TELEPHONE ENCOUNTER
Returned call to home care nurse, unable to reach. Left VM with verbal orders per protocol as requested. Left callback number for questions.    Carla Goznales RN

## 2018-10-22 NOTE — TELEPHONE ENCOUNTER
[facility] phone call message - order or referral request from patient:     Order or referral being requested: Requested order: skilled nursing    Additional Details: 1x/week for 9 weeks and 3 PRN    OK to leave a message on voice mail? Yes    Primary language: English      needed? No    Call taken on October 22, 2018 at 10:36 AM by Apryl Smith

## 2018-10-22 NOTE — LETTER
October 23, 2018      Clarke Moreira  [address]  [place] [address]        Dear Clarke,    Thank you for getting your care at [facility]. Please see below for your test results.  Your electrolytes are good.  Your BNP (for heart failure) is better than last time.  I sent your cardiology team a message about the dilemma of low blood pressure vs diuresis    Resulted Orders   Basic Metabolic Panel ([facility])   Result Value Ref Range    Urea Nitrogen 23.6 (H) 7.0 - 21.0 mg/dL    Calcium 9.6 8.5 - 10.1 mg/dL    Chloride 98.2 98.0 - 110.0 mmol/L    Carbon Dioxide 29.0 20.0 - 32.0 mmol/L    Creatinine 1.3 0.7 - 1.3 mg/dL    Glucose 114.1 (H) 70.0 - 99.0 mg'dL    Potassium 4.0 3.3 - 4.5 mmol/dL    Sodium 133.5 132.6 - 141.4 mmol/L    GFR Estimate 58.3 (L) >60.0 mL/min/1.7 m2    GFR Estimate If Black 70.6 >60.0 mL/min/1.7 m2   N terminal pro BNP outpatient   Result Value Ref Range    N-Terminal Pro Bnp 1091 (H) 0 - 125 pg/mL      Comment:         Reference range shown and results flagged as abnormal are for the outpatient,   non acute settings. Establishing a baseline value for each individual patient   is useful for follow-up.  Suggested inpatient cut points for confirming diagnosis of CHF in an acute   setting are:   >450 pg/mL (age 18 to less than 50)   >900 pg/mL (age 50 to less than 75)   >1800 pg/mL (75 yrs and older)  An inpatient or emergency department NT-proPBNP <300 pg/mL effectively rules   out acute CHF, with 99% negative predictive value.      INR ([facility])   Result Value Ref Range    INR 3.5       Comment:      Adult Normal Range: 0.90 - 1.10  Therapeutic Range: 2.0 - 3.5             Sincerely,    Matthias Sanchez MD

## 2018-10-22 NOTE — MR AVS SNAPSHOT
After Visit Summary   10/22/2018    Clarke Moreira    MRN: [id number]           Patient Information     Date Of Birth          1950        Visit Information        Provider Department      10/22/2018 10:20 AM Matthias Sanchez MD [facility]        Today's Diagnoses     (HFpEF) heart failure with preserved ejection fraction (H)    -  1    Pre-syncope        Essential hypertension with goal blood pressure less than 140/90        Lymphedema        Chronic atrial fibrillation (H)        Depression with anxiety        Type 2 diabetes mellitus with hyperglycemia, without long-term current use of insulin (H)           Follow-ups after your visit        Your next 10 appointments already scheduled     Oct 29, 2018 10:40 AM CDT   Return Visit with [facility] ([facility])    [address] [place] [address],  [address]  [facility] [address]   [phone]            Oct 30, 2018  9:00 AM CDT   (Arrive by 8:45 AM)   RETURN HEART FAILURE with Christian Willoughby MD   [facility] ([facility])    [address] [address] [phone]            Nov 05, 2018  8:00 PM CST   PSG Titration w/TCM with SLEEP STUDY  5   [facility] ([facility])    [address] [place] [address] [phone]            Nov 12, 2018 10:40 AM CST   Return Visit [facility] ([facility])    [address],  [address]  [facility] [address]   [phone]            Nov 13, 2018 11:20 AM CST   (Arrive by 11:05 AM)   [facility]    [address] [facility]  4th Floor  [facility] [id number] [phone]            Nov 16, 2018  2:20 PM CST    Return Sleep Patient with Renuka Gordon MD   [facility] ([facility])    [address] [place] [id number] [phone]            2018 10:40 AM CST   [facility] Visit [facility] ([facility])    [address],  [address]  [facility] [address]   [phone]              Who to contact     Please call your clinic at [phone] to:    Ask questions about your health    Make or cancel appointments    Discuss your medicines    Learn about your test results    Speak to your doctor            Additional Information About Your Visit        MyChart Information     [employer] is an electronic gateway that provides easy, online access to your medical records. With [email or web address], you can request a clinic appointment, read your test results, renew a prescription or communicate with your care team.     To sign up for [employer] visit the website at [email or web address]   You will be asked to enter the access code listed below, as well as some personal information. Please follow the directions to create your username and password.     Your access code is: [email or web address]  Expires: 1/15/2019  1:54 PM     Your access code will  in 90 days. If you need help or a new code, please contact your [facility] or call [phone] for assistance.        Care EveryWhere ID     This is your Care EveryWhere ID. This could be used by other organizations to access your [place] medical records  [id number]        Your Vitals Were     Pulse Temperature Pulse Oximetry BMI (Body Mass Index)          92 98.8  F (37.1  C) (Oral) 98% 52.49 kg/m2         Blood Pressure from Last 3 Encounters:   10/22/18 102/67   10/10/18 101/65   18 135/74    Weight from Last 3 Encounters:   10/22/18 (!) 387 lb (175.5 kg)   10/10/18 (!) 382 lb (173.3 kg)   18 (!)  369 lb 14.4 oz (167.8 kg)              We Performed the Following     Basic Metabolic Panel ([facility])     INR ([facility])     N terminal pro BNP outpatient        Primary Care Provider Office Phone # Fax #    Matthias Sanchez [phone]       [address] [address]        Equal Access to Services     [facility]: Hadii aad ku hadasho Soomaali, waaxda luqadaha, qaybta kaalmada adeegyada, waxay idiin hayaan adeeg kharash la'aan . So [facility] [phone].    ATENCIÓN: Si habla español, tiene a kim disposición servicios gratuitos de asistencia lingüística. Alexis al [phone].    We comply with applicable federal civil rights laws and [place] laws. We do not discriminate on the basis of race, color, national origin, age, disability, sex, sexual orientation, or gender identity.            Thank you!     Thank you for choosing [facility]  for your care. Our goal is always to provide you with excellent care. Hearing back from our patients is one way we can continue to improve our services. Please take a few minutes to complete the written survey that you may receive in the mail after your visit with us. Thank you!             Your Updated Medication List - Protect others around you: Learn how to safely use, store and throw away your medicines at www.disposemymeds.org.          This list is accurate as of 10/22/18  1:04 PM.  Always use your most recent med list.                   Brand Name Dispense Instructions for use Diagnosis    ammonium lactate 12 % cream    LAC-HYDRIN    385 g    Apply topically 2 times daily as needed for dry skin    Dry skin, Lichen of skin       aspirin 81 MG tablet     30 tablet    Take 1 tablet (81 mg) by mouth daily    Essential hypertension       atorvastatin 40 MG tablet    LIPITOR    30 tablet    Take 1 tablet (40 mg) by mouth daily    Type 2 diabetes mellitus without complication, without long-term current use of insulin (H)        blood glucose monitoring lancets     100 each    Use to test blood sugars 2 times daily or as directed.    Diabetes mellitus, type 2 (H)       blood glucose monitoring meter device kit     1 kit    Use to test blood sugars 2 times daily or as directed.    Type 2 diabetes mellitus with hyperglycemia, without long-term current use of insulin (H)       blood glucose monitoring test strip    ONETOUCH ULTRA    100 strip    Use to test blood sugars 2 times daily or as directed.    Diabetes mellitus, type 2 (H)       levothyroxine 175 MCG tablet    SYNTHROID/LEVOTHROID    30 tablet    Take 1 tablet (175 mcg) by mouth daily    Hypothyroidism, unspecified type       lisinopril 5 MG tablet    PRINIVIL/ZESTRIL    60 tablet    Take 1 tablet (5 mg) by mouth daily Take in Pm    Chronic systolic congestive heart failure (H)       metFORMIN 500 MG tablet    GLUCOPHAGE    60 tablet    Take 1 tablet (500 mg) by mouth 2 times daily (with meals)    Type 2 diabetes mellitus without complication, without long-term current use of insulin (H)       metoprolol succinate 100 MG 24 hr tablet    TOPROL-XL    30 tablet    Take 1 tablet (100 mg) by mouth daily    Essential hypertension with goal blood pressure less than 140/90       multivitamin, therapeutic with minerals Tabs tablet     30 each    Take 1 tablet by mouth daily    Type 2 diabetes mellitus without complication, without long-term current use of insulin (H)       nystatin cream    MYCOSTATIN    30 g    Apply twice daily to affected area    Tinea pedis, unspecified laterality       omega 3 1000 MG Caps     30 capsule    Take 1 g by mouth daily    Hyperlipidemia LDL goal <100       * order for DME     2 each    Equipment being ordered: Other: Velcro Compression stockings.  Treatment Diagnosis: bilateral lymphedema.    Lymphedema of extremity       * order for DME     1 Units    Equipment being ordered: Bariatric Lift chair Diagnosis - morbid obesity, CHF, Lymphedema  Fax to  Ne from [employer] at [phone].    Chronic systolic congestive heart failure (H), Lymphedema, Morbid obesity (H)       polyethylene glycol Packet    MIRALAX/GLYCOLAX    15 packet    Take 17 g by mouth daily as needed for constipation    Constipation, unspecified constipation type       potassium chloride SA 20 MEQ CR tablet    K-DUR/KLOR-CON M    90 tablet    Take 1 tablet (20 mEq) by mouth 2 times daily    Hypokalemia       senna-docusate 8.6-50 MG per tablet    SENOKOT-S;PERICOLACE    60 tablet    Take 1-2 tablets by mouth 2 times daily as needed for constipation    Constipation, unspecified constipation type       spironolactone 25 MG tablet    ALDACTONE    30 tablet    Take 1 tablet (25 mg) by mouth daily    Chronic systolic congestive heart failure (H)       tamsulosin 0.4 MG capsule    FLOMAX    30 capsule    Take 1 capsule (0.4 mg) by mouth daily    Urinary retention       torsemide 100 MG tablet    DEMADEX    60 tablet    Take 1 tablet (100 mg) by mouth 2 times daily    (HFpEF) heart failure with preserved ejection fraction (H)       vitamin B complex with vitamin C Tabs tablet      Take 1 tablet by mouth daily        VITAMIN C PO           VITAMIN E COMPLEX PO           warfarin 5 MG tablet    COUMADIN    75 tablet    Take 2.5 tablets (12.5 mg) by mouth daily    Persistent atrial fibrillation (H)       zolpidem 5 MG tablet    AMBIEN    1 tablet    Take tablet by mouth 15 minutes prior to sleep, for Sleep Study    Chronic hypercapnic respiratory failure (H), Hypoventilation associated with obesity syndrome (H), BiPAP (biphasic positive airway pressure) dependence       * Notice:  This list has 2 medication(s) that are the same as other medications prescribed for you. Read the directions carefully, and ask your doctor or other care provider to review them with you.

## 2018-10-22 NOTE — Clinical Note
Dr BARRIOS / Alisha I saw Clarke today. Need help in adjusting meds. He sees you on Oct 30. The issue is pre-syncope vs diuresis. His BP is very low and he has had some pre-syncopal episodes again with vision changes and lightheadedness. However, he has also gained 18 lbs (some is wt gain from eating, but the rest is prob intraabdominal fluid). I had previously increased torasemide for diuresis.  I reduced spironolactone has visit because of low BP. He does not feel like he is getting the robust diuresis he did previously with increased torasemide. I was thinking of decreasing torasemide and increasing spironolactone. I'm sure this is a frequent problem in your world. Hopefully, you can guide us. Matthew Sanchez MD [facility]

## 2018-10-23 ENCOUNTER — MEDICAL CORRESPONDENCE (OUTPATIENT)
Dept: HEALTH INFORMATION MANAGEMENT | Facility: CLINIC | Age: 68
End: 2018-10-23

## 2018-10-23 DIAGNOSIS — R33.9 URINARY RETENTION: ICD-10-CM

## 2018-10-23 RX ORDER — TAMSULOSIN HYDROCHLORIDE 0.4 MG/1
0.4 CAPSULE ORAL DAILY
Qty: 30 CAPSULE | Refills: 11 | Status: ON HOLD | OUTPATIENT
Start: 2018-10-23 | End: 2019-03-16

## 2018-10-23 NOTE — TELEPHONE ENCOUNTER

## 2018-10-24 DIAGNOSIS — I50.30 (HFPEF) HEART FAILURE WITH PRESERVED EJECTION FRACTION (H): Primary | ICD-10-CM

## 2018-10-24 ASSESSMENT — PATIENT HEALTH QUESTIONNAIRE - PHQ9: SUM OF ALL RESPONSES TO PHQ QUESTIONS 1-9: 21

## 2018-10-24 NOTE — PROGRESS NOTES
[facility]:  Care Coordination Note     SITUATION   Clarke Moreira is a 68 year old male. He came in for visit with his PCP.    BACKGROUND     Pt has a long medical history and was added as a potential fit for the complex care management program.     ASSESSMENT   Pt is aware and active in his medical diagnosis and treatment. He is currently worried specifically about his weight and how this is affecting his other health problems, as well as his depression and anxiety. He is aware of his social isolation and the affects that has on his weight and health problems. He is currently experiencing anxiety about insurance changes and his costs potentially going up.     He currently has a home care nurse who comes to his house and helps him with his medications. He is accessing [employer]. His current income is coming from his social security benefits.    He is interested in the support our complex care management will provide. He is not certain about a home visit, but will be interested in the ongoing support of the social work team.    He currently lists his goals as increasing his mobility and strength with a goal of only needing a cane to walk, instead of his current walker. He is interested in the silver sneaker program and would like more information and familiarization with that program.      PLAN   Follow-up plan:  Social work team will visit with Clarke the next time he is in clinic. We will get more information about the silver sneaker program and provide insurance resources, to address his anxiety in this area       Elizabeth Ochoa  Social Work

## 2018-10-25 ASSESSMENT — ANXIETY QUESTIONNAIRES: GAD7 TOTAL SCORE: 13

## 2018-10-29 ENCOUNTER — OFFICE VISIT (OUTPATIENT)
Dept: PSYCHOLOGY | Facility: CLINIC | Age: 68
End: 2018-10-29
Payer: COMMERCIAL

## 2018-10-29 DIAGNOSIS — F41.1 GAD (GENERALIZED ANXIETY DISORDER): ICD-10-CM

## 2018-10-29 DIAGNOSIS — F33.1 MODERATE EPISODE OF RECURRENT MAJOR DEPRESSIVE DISORDER (H): Primary | ICD-10-CM

## 2018-10-29 NOTE — PROGRESS NOTES
"Behavioral Health Progress Note    Client Legal Name: Clarke Moreira   Client Preferred Name: Clarke   Service Type: Individual  Length of Visit: 50 minutes  Attendees: patient     Identifying Information and Presenting Problem:    The patient is a 68 year old American male who is being seen for problematic symptoms of depression, ongoing adjustment to medical illness, as well as social isolation.  He finds enjoyment and support from people on his healthcare team and worries that as he is getting better, which he does see as a positive thing, it means that he has less connection with other people.     Treatment Objective(s) Addressed in This Session:    reports one goal is maintaining healthy eating patterns, feels as if he is returning to old patterns before he went into the hospital and believes this is indicative of his mental disease, doesn't want to do physical activity because then he has to leave his home.    Progress on / Status of Treatment Objective(s) / Homework:  Clarke is uncertain if there is a way for him to improve things for himself.    PHQ-9 SCORE 8/23/2018 8/28/2018 10/22/2018   Total Score - - -   Total Score 24 24 21       CHRIS-7 SCORE 8/23/2018 8/28/2018 10/22/2018   Total Score - - -   Total Score 18 18 13     Topics Discussed/Interventions Provided:  Clarke shared a story of a beautiful scene with wild turkeys that he saw, and at one level he could appreciate the beauty, but then states that \"nothing seems real to me unless it hurts.\"      Janice came to visit him and shared pictures from her trip.  They were laughing a lot and having a good time.  But Clarke describes a complete feeling of emptiness, a void that occurs after she leaves that leads him to want to avoid future interactions with her.  \"I was sucking up her memories and when she leaves I feel vacant and void\"He sees her as having a \"real\" life that he does not believe that he can ever have.  Uses this as an example as how " "he is in the same place that he is always in when it comes to relationships with other people.    Clarke does not think she is friends with him because she necessarily enjoys this as a friendship, but that he is a \"project\"  Gently challenged these thoughts because based on previous information it doesn't sound like he would even allow himself this level of connection with anyone and that if he was truly a project she probably wouldn't be sticking with this for years and years.  Also introduced him to the concept of an upward spiral, where things [place] around and when he looks at it, he may think they are the same issues, but without realizing that he is spiraling around in upward direction, so seeing the parts of the same issues from different points and places.      Clarke shared that an observation that people here have always been nice, but they seemed even more nice and receptive to him after he had his change following the hospitalizations.  Discussed how he does have power, as his different approach with people, brought forward a different reaction from people.  It opened the door to allow some closer connection with the people here.     Assessment: The patient appeared to be active and engaged in today's session and was receptive to feedback.    Mental Status: Clarke appeared generally alert and oriented. He was dressed in shorts, a shirt, and his biker vest.  He was well-groomed.  Pleasant and engaged in the conversation.  Eye contact was good, appropriate. Speech was of normal volume and rate and was clear, coherent, and relevant. Mood was down, hopeless.  He discusses his resignation that this is how it will always be, but then finds other parts of the session to be delighted with and expresses this delight.  Thought processes were relevant, logical and goal-directed. Thought content was WNL with no evidence of psychotic or paranoid features. No evidence of SI/HI or self-harm, intent, or plans. Memory " appeared grossly intact. Insight and judgment appeared good and patient exhibited good impulse control during the appointment.     Does the patient appear to be at imminent risk of harm to self/others at this time? No    The session was necessary to address deep feelings of disconnection and isolation from other people that seems to come from a sense that he is not worthy of the attention or time of others.  Clarke tries to be the person he thinks the person wants him to be, but then feels dissatisfied with the inauthenticity of these encounters and empty afterwards.  These symptoms have been interfering with patient's ability to function at home and socially, he mostly homebound, coming out only for appointments.  Ongoing psychotherapy is necessary to provide counseling and provide support.    Diagnosis (DSM-5):  Major Depression, recurrent, moderate  Generalized Anxiety Disorder  R/o persistent depressive disorder    Plan:  1. Follow up in 2 weeks.    NOTE: Treatment plan needed.  Diagnostic assessment update due 10/15/19.

## 2018-10-29 NOTE — PROGRESS NOTES
Christian Willoughby M.D.  Cardiovascular Medicine    I personally saw and examined this patient, discussed care with housestaff and other consultants, reviewed current laboratories and imaging studies, and conveyed impression and diagnostic/therapeutic plan to patient.    Problem List  1, Chronic systolic heart failure  2. Morbid obesity  3. Sleep disordered breathing  4. Atrial fibrillation  5. Hyperlipidemia  6. Multi-faceted shortness of breath  7. Anxiety/depression  8. Type II DM  9. Warfarin anticoagulation  10. Allergies: none  11. Lymphedema  12. Hypertension      Referring:     Matthias Sanchez M.D.  Rebekah Reddy - [facility]      History    69 year old male seen for evaluation and treatment regarding congestive heart failure in the context of morbid obesity.     He is having episodes of pre-syncope, faintness which are sometimes related to orthostatic stimuli such as position change or may occur post prandially.  The dureation is 20-30 seconds.  They do not occur daily.  They seem to occur in clusters interrupted by periods with no occurrences.  The often occur immediately following breakfast.  The patient has no palpitations, head trauma.  His lisinopril has been reduced from 10 to 5, and spironolactone also reduced.  He is on Flomax which he takes in evening.  Sleep study pending. TSH is within normal limits.    Patient lives alone, follows low sodium diet.  He is on 2L fluid restriction that he acknowledges that he is not following.              Occupational: middle management    Longstanding depression    Family: orphan    Allergy: none    Surgery: broken bones    Has bariatric bed and chair - need maintenance    REVIEW of SYMPTOMS    Constitutional: without fever, chills, night sweats.  Weight is up 7 pounds over last several months  HEENT: without dry eyes, dry mouth, sinusitis, corryza, visual changes  Endocrine: without polyuria, polydypsia, polyphagia, heat or cold intolerance, changing  mental status. Hemoglobin A!C modestly elevated  Cardiology: without chest pain, tightness, heaviness, pressure, paroxysmal dyspnea, orthopnea, palpitation, pre-syncope or syncope or device discharge (if present)  Pulmonary: without asthma, wheezing, cough, hemoptysis  GI: without nausea, emesis, jaundice, pain, hematemesis, melena  : without frequency, urgency, hematuria, stones, pain, abnormal bleeding, frequency, urgency but history of urinary retention requiring catherization   Neurologic: without TIA, CVA, trauma, seizure  Dermatologic: without lesions, abrasion rash,   Orthopedic/Rheum: without significant joint pain, impairment, limb, polyserositis, ulceration, Raynauds  Heme: without mass, bruising, frequent infection, anemia  Psychiatric: without substance abuse, hallucination, medication, depression    Exercise tolerance: reduced, walks with walker, home physical therapy    Objective  Constitutional: alert, oriented, normal abnormal  Gait. and station, normal mentation.  Oral: moist mucous membranes  Lymph: without pathologic adenopathy  Chest: clear to ausculation and percussion save for basilar rales  Cor: No evidence of left or right ventricular activity.  Rhythm is regular.  S1 normal, S2 split physiologically. Murmurs are not present  Abdomen: without tenderness, rebound, guarding, masses, ascites  Extremities: Chronic lymphedema  Neuro: no focal defects, normal mentation  Skin: without open lesions  Psych: oriented, verbal, mental status in tact      Wt Readings from Last 24 Encounters:   10/30/18 (!) 175.7 kg (387 lb 6.4 oz)   10/22/18 (!) 175.5 kg (387 lb)   10/10/18 (!) 173.3 kg (382 lb)   09/07/18 (!) 167.8 kg (369 lb 14.4 oz)   08/28/18 (!) 167.8 kg (370 lb)   07/18/18 (!) 170.1 kg (375 lb)   06/18/18 (!) 170.4 kg (375 lb 9.6 oz)   06/15/18 (!) 171.5 kg (378 lb 1.6 oz)   06/08/18 (!) 167.8 kg (369 lb 14.9 oz)   05/21/18 (!) 174.9 kg (385 lb 9.6 oz)   04/27/18 (!) 173.7 kg (383 lb)   04/26/18  (!) 173.7 kg (383 lb)   03/27/18 (!) 173.7 kg (383 lb)   02/26/18 (!) 172.4 kg (380 lb)   01/15/18 (!) 170.1 kg (375 lb)   01/10/18 (!) 168.3 kg (371 lb 1.6 oz)   12/26/17 (!) 198.5 kg (437 lb 9.6 oz)   12/21/17 (!) 191.3 kg (421 lb 12.8 oz)   12/04/17 (!) 185.5 kg (409 lb)   11/27/17 (!) 198.2 kg (437 lb)   11/10/17 (!) 196 kg (432 lb 3.2 oz)   10/27/17 (!) 181.9 kg (401 lb)   10/07/17 (!) 184 kg (405 lb 10.3 oz)   08/14/17 (!) 191 kg (421 lb)       Meds      Current Outpatient Prescriptions   Medication     ammonium lactate (LAC-HYDRIN) 12 % cream     Ascorbic Acid (VITAMIN C PO)     aspirin 81 MG tablet     atorvastatin (LIPITOR) 40 MG tablet     blood glucose monitoring (ONE TOUCH DELICA) lancets     blood glucose monitoring (ONE TOUCH ULTRA MINI) meter device kit     blood glucose monitoring (ONETOUCH ULTRA) test strip     levothyroxine (SYNTHROID/LEVOTHROID) 175 MCG tablet     lisinopril (PRINIVIL/ZESTRIL) 5 MG tablet     metFORMIN (GLUCOPHAGE) 500 MG tablet     metoprolol (TOPROL-XL) 100 MG 24 hr tablet     multivitamin, therapeutic with minerals (MULTI-VITAMIN) TABS tablet     nystatin (MYCOSTATIN) cream     omega 3 1000 MG CAPS     order for DME     order for DME     polyethylene glycol (MIRALAX/GLYCOLAX) Packet     potassium chloride SA (K-DUR/KLOR-CON M) 20 MEQ CR tablet     senna-docusate (SENOKOT-S;PERICOLACE) 8.6-50 MG per tablet     spironolactone (ALDACTONE) 25 MG tablet     tamsulosin (FLOMAX) 0.4 MG capsule     torsemide (DEMADEX) 100 MG tablet     vitamin B complex with vitamin C (VITAMIN  B COMPLEX) TABS tablet     VITAMIN E COMPLEX PO     warfarin (COUMADIN) 5 MG tablet     zolpidem (AMBIEN) 5 MG tablet     No current facility-administered medications for this visit.        Labs    Results for CALLAHAN AJIT RENÉE (MRN [id number]) as of 10/30/2018 09:22   Ref. Range 8/28/2018 13:58 8/28/2018 16:40 9/9/2018 11:11 10/22/2018 10:09 10/22/2018 12:46 10/30/2018 08:41   Sodium Latest Ref Range: 132.6 -  141.4 mmol/L 136.9   133.5     Potassium Latest Ref Range: 3.3 - 4.5 mmol/dL 4.3   4.0     Chloride Latest Ref Range: 98.0 - 110.0 mmol/L 99.7   98.2     Carbon Dioxide Latest Ref Range: 20.0 - 32.0 mmol/L 29.4   29.0     Urea Nitrogen Latest Ref Range: 7.0 - 21.0 mg/dL 34.5 (H)   23.6 (H)     Creatinine Latest Ref Range: 0.7 - 1.3 mg/dL 1.2   1.3     GFR Estimate Latest Ref Range: >60.0 mL/min/1.7 m2 64.0   58.3 (L)     GFR Estimate If Black Latest Ref Range: >60.0 mL/min/1.7 m2 77.4   70.6     Calcium Latest Ref Range: 8.5 - 10.1 mg/dL 9.4   9.6     Albumin Latest Ref Range: 3.5 - 4.7 mg/dL 4.4        Protein Total Latest Ref Range: 6.8 - 8.8 g/dL 7.6        Bilirubin Total Latest Ref Range: 0.2 - 1.3 mg/dL 0.7        Alkaline Phosphatase Latest Ref Range: 31.7 - 110.7 U/L 81.8        ALT Latest Ref Range: 0.0 - 45.0 U/L 18.0        AST Latest Ref Range: 0.0 - 55.0 U/L 18.0        Hemoglobin A1C Latest Ref Range: 4.1 - 5.7 % 5.7        Cholesterol Latest Ref Range: 0.0 - 200.0 mg/dL 120.3        Cholesterol/HDL Ratio Latest Ref Range: 0.0 - 5.0  2.7        HDL Cholesterol Latest Ref Range: >40.0 mg/dL 44.1        LDL Cholesterol Calculated Latest Ref Range: 0 - 129 mg/dL 55        VLDL Cholesterol Latest Ref Range: 7.0 - 32.0 mg/dL 21.4        N-Terminal Pro Bnp Latest Ref Range: 0 - 125 pg/mL  1231 (H)   1091 (H)    Occult Blood Scn FIT Latest Ref Range: NEG^Negative    Negative      Triglycerides Latest Ref Range: 0.0 - 150.0 mg/dL 107.0        Glucose Latest Ref Range: 70.0 - 99.0 mg'dL 99.4 (H)   114.1 (H)     INR Unknown 2.3   3.5     ECHO COMPLETE WITH OPTISON Unknown      Rpt         Imaging   Name: AJIT CALLAHAN  MRN: [id number]  : 1950  Study Date: 2017 08:49 AM  Age: 67 yrs  Gender: Male  Patient Location: [facility]  Reason For Study: Pulmonary Hypertension  Ordering Physician: LEWIS BALTAZAR  Performed By: Bernard Snider RDCS     BSA: 3.0 m2  Height: 71 in  Weight: 444 lb  HR: 110  BP:  87/61 mmHg  _____________________________________________________________________________  __        Procedure  Limited Portable Echo Adult. Contrast Lumason. Lumason (NDC #9871-3130-05)  given intravenously. Patient was given 5ml mixture of Lumason. 0 ml wasted.  _____________________________________________________________________________  __        Interpretation Summary  LVEF is probably normal on limited contrasct-enhanced images.  On limited views, RV function is probably mildly reduced.  Pulmonary artery systolic pressure cannot be assessed.  The inferior vena cava is dilated at 3.34 cm without respiratory variability, consistent with increased right atrial pressure. Estimated right atrial pressure is > 15 mmHg.  No pericardial effusion is present.  _____________________________________________________________________________    Exam: XR CHEST PORT 1 VW, 10/4/2017 9:39 AM  Weight around 404     Indication: Follow up CXR on pulmonary edema     Comparison: Chest radiograph 9/26/2017.     Findings:   Single AP view of the chest dated degrees. Right upper extremity PICC  in stable position with tip in the mid SVC. The visualized upper  abdomen is unremarkable. Left-sided rib fractures. Soft tissues are  unremarkable. The trachea is midline. The cardiomediastinal silhouette  is within normal limits. Interval resolution of bilateral pleural  effusions and associated bibasilar atelectasis and consolidation. Mild  interval decrease in pulmonary vascular congestion.         Impression:   1. Interval resolution bilateral pleural effusions and associated  bibasilar atelectasis and/or consolidation.  2. Mild interval decrease in pulmonary vascular congestion.     I have personally reviewed the examination and initial interpretation  and I agree with the findings.    Clinical history: 74 year old man with AF, recovered tachycardia mediated cardiomyopathy and dilated aortic  root on TTE 4.2 cm. MRA for further evaluation.       Comparison MRA: None.      1. The aortic root is mildly dilated, measuring 3.9 cm at the level of sinus of Valsalva.     2. The ascending aorta measures 3.9 cm. The transverse arch and descending thoracic aorta are normal in  size without an aneurysm or dissection.     2. The aortic arch is left sided. The brachiocaphalic and left common carotid arteries both have common  origin. There is no coarctation.     3. The main and proximal branch pulmonary arteries are normal in size.      4. The systemic venous connections are normal.      CONCLUSIONS: The aortic         Assessment/Plan     The etiology of the patients heart failure is likely multi-factorial relating to abnormalities of relations, obesity, alveolar hypoventilation.    He should consider new humidifier    His weight is up 20 pounds and he feels he is retaining fluid in abdomen, not legs.  BUN 23 and BNP 1091  The patient's symptoms sound like low blood pressure, but we are trapped in paradox of fluid accumulation vs spells described above.  We need to exclude atrial fibrillation or other arrhythmia with ZeoPatch.   X 15 days      Echocardiogram reviewed (not read formally) demonstrates mildly enlarged LV and RV with normal systolic function and EF 65%                CC  Matthias Sanchez  [facility]  Dilia Greene, Ph.D. [employer]

## 2018-10-29 NOTE — MR AVS SNAPSHOT
After Visit Summary   10/29/2018    Clarke Moreira    MRN: [id number]           Patient Information     Date Of Birth          1950        Visit Information        Provider Department      10/29/2018 10:40 AM Lety Buenrostro PsyD [facility]        Today's Diagnoses     Moderate episode of recurrent major depressive disorder (H)    -  1    CHRIS (generalized anxiety disorder)           Follow-ups after your visit        Your next 10 appointments already scheduled     Nov 05, 2018  8:00 PM CST   PSG Titration w/TCM with SLEEP STUDY  5   [facility] ([facility])    [address] [id number]   [phone]            Nov 06, 2018 10:00 AM CST   (Arrive by 9:45 AM)   ZIOPATCH MONITOR with  Cvc Monitor [facility] ([facility])    [address] [facility] [address] [facility]  [address] [place] [id number] [phone]            Nov 12, 2018 10:40 AM CST   Return Visit with [facility] ([facility])    [address] [place] [address],  [address]  [facility] [address]   [phone]            Nov 13, 2018 11:20 AM CST   (Arrive by 11:05 AM)   RETURN FOOT/ANKLE with Jesús Lagos [facility] ([facility])    [address]  [address]th [address]  [facility] [address]   [phone]            Nov 16, 2018  2:20 PM CST   Return Sleep Patient with Renuka Gordon MD   [facility] ([facility])    [address] [place] [id number] [phone]            Nov 26, 2018 10:40 AM CST   Return Visit with [facility] ([facility])    [address] [place],  [address]  [facility] [address]   [phone]            Dec 12, 2018  12:00 PM CST   (Arrive by 11:45 AM)   CORE RETURN with REAGAN Okeefe CNP   [facility] ([facility])    [address] [place] [address] [phone]              Future tests that were ordered for you today     Open Future Orders        Priority Expected Expires Ordered    Follow-Up with Cardiac Advanced Practice Provider Routine 2018 10/30/2019 10/30/2018    Follow-Up with Cardiologist Routine 2018 10/30/2019 10/30/2018    Ziopatch Holter Monitor - Adult Routine  2018 10/30/2018            Who to contact     Please call your clinic at [phone] to:    Ask questions about your health    Make or cancel appointments    Discuss your medicines    Learn about your test results    Speak to your doctor            Additional Information About Your Visit        [employer] Information     [employer] is an electronic gateway that provides easy, online access to your medical records. With Vo[employer]Switch, you can request a clinic appointment, read your test results, renew a prescription or communicate with your care team.     To sign up for [employer] visit the website at [email or web address]   You will be asked to enter the access code listed below, as well as some personal information. Please follow the directions to create your username and password.     Your access code is: [email or web address]  Expires: 1/15/2019  1:54 PM     Your access code will  in 90 days. If you need help or a new code, please contact your [facility] or call [phone] for assistance.        Care EveryWhere ID     This is your Care EveryWhere ID. This could be used by other organizations to access your [place] medical records  [id number]         Blood Pressure from Last 3 Encounters:   10/30/18 98/65   10/22/18 102/67   10/10/18 101/65    Weight from Last 3 Encounters:   10/30/18 (!) 387 lb 6.4 oz (175.7 kg)   10/22/18 (!) 387 lb (175.5 kg)    10/10/18 (!) 382 lb (173.3 kg)              Today, you had the following     No orders found for display       Primary Care Provider Office Phone # Fax #    Matthias Sanchez [phone]       [address] [address]        Equal Access to Services     DOTTIEELA TRISHA : Hadii aad ku hadasho Soomaali, waaxda luqadaha, qaybta kaalmada adeegyada, waxay hazelin haydomingon adenegra shelbi hermelindo pa. So [facility] [phone].    ATENCIÓN: Si habla español, tiene a kim disposición servicios gratuitos de asistencia lingüística. [facility] [phone].    We comply with applicable federal civil rights laws and [place] laws. We do not discriminate on the basis of race, color, national origin, age, disability, sex, sexual orientation, or gender identity.            Thank you!     Thank you for choosing [facility]  for your care. Our goal is always to provide you with excellent care. Hearing back from our patients is one way we can continue to improve our services. Please take a few minutes to complete the written survey that you may receive in the mail after your visit with us. Thank you!             Your Updated Medication List - Protect others around you: Learn how to safely use, store and throw away your medicines at www.disposemymeds.org.          This list is accurate as of 10/29/18 11:59 PM.  Always use your most recent med list.                   Brand Name Dispense Instructions for use Diagnosis    ammonium lactate 12 % cream    LAC-HYDRIN    385 g    Apply topically 2 times daily as needed for dry skin    Dry skin, Lichen of skin       aspirin 81 MG tablet     30 tablet    Take 1 tablet (81 mg) by mouth daily    Essential hypertension       atorvastatin 40 MG tablet    LIPITOR    30 tablet    Take 1 tablet (40 mg) by mouth daily    Type 2 diabetes mellitus without complication, without long-term current use of insulin (H)       blood glucose monitoring lancets     100  each    Use to test blood sugars 2 times daily or as directed.    Diabetes mellitus, type 2 (H)       blood glucose monitoring meter device kit     1 kit    Use to test blood sugars 2 times daily or as directed.    Type 2 diabetes mellitus with hyperglycemia, without long-term current use of insulin (H)       blood glucose monitoring test strip    ONETOUCH ULTRA    100 strip    Use to test blood sugars 2 times daily or as directed.    Diabetes mellitus, type 2 (H)       levothyroxine 175 MCG tablet    SYNTHROID/LEVOTHROID    30 tablet    Take 1 tablet (175 mcg) by mouth daily    Hypothyroidism, unspecified type       lisinopril 5 MG tablet    PRINIVIL/ZESTRIL    60 tablet    Take 1 tablet (5 mg) by mouth daily Take in Pm    Chronic systolic congestive heart failure (H)       metFORMIN 500 MG tablet    GLUCOPHAGE    60 tablet    Take 1 tablet (500 mg) by mouth 2 times daily (with meals)    Type 2 diabetes mellitus without complication, without long-term current use of insulin (H)       metoprolol succinate 100 MG 24 hr tablet    TOPROL-XL    30 tablet    Take 1 tablet (100 mg) by mouth daily    Essential hypertension with goal blood pressure less than 140/90       multivitamin, therapeutic with minerals Tabs tablet     30 each    Take 1 tablet by mouth daily    Type 2 diabetes mellitus without complication, without long-term current use of insulin (H)       nystatin cream    MYCOSTATIN    30 g    Apply twice daily to affected area    Tinea pedis, unspecified laterality       omega 3 1000 MG Caps     30 capsule    Take 1 g by mouth daily    Hyperlipidemia LDL goal <100       * order for DME     2 each    Equipment being ordered: Other: Velcro Compression stockings.  Treatment Diagnosis: bilateral lymphedema.    Lymphedema of extremity       * order for DME     1 Units    Equipment being ordered: Bariatric Lift chair Diagnosis - morbid obesity, CHF, Lymphedema  Fax to Ne from [email or web address] at [phone].    Chronic  systolic congestive heart failure (H), Lymphedema, Morbid obesity (H)       polyethylene glycol Packet    MIRALAX/GLYCOLAX    15 packet    Take 17 g by mouth daily as needed for constipation    Constipation, unspecified constipation type       potassium chloride SA 20 MEQ CR tablet    K-DUR/KLOR-CON M    90 tablet    Take 1 tablet (20 mEq) by mouth 2 times daily    Hypokalemia       senna-docusate 8.6-50 MG per tablet    SENOKOT-S;PERICOLACE    60 tablet    Take 1-2 tablets by mouth 2 times daily as needed for constipation    Constipation, unspecified constipation type       spironolactone 25 MG tablet    ALDACTONE    30 tablet    Take 1 tablet (25 mg) by mouth daily    Chronic systolic congestive heart failure (H)       tamsulosin 0.4 MG capsule    FLOMAX    30 capsule    Take 1 capsule (0.4 mg) by mouth daily    Urinary retention       torsemide 100 MG tablet    DEMADEX    60 tablet    Take 1 tablet (100 mg) by mouth 2 times daily    (HFpEF) heart failure with preserved ejection fraction (H)       vitamin B complex with vitamin C Tabs tablet      Take 1 tablet by mouth daily        VITAMIN C PO           VITAMIN E COMPLEX PO           warfarin 5 MG tablet    COUMADIN    75 tablet    Take 2.5 tablets (12.5 mg) by mouth daily    Persistent atrial fibrillation (H)       zolpidem 5 MG tablet    AMBIEN    1 tablet    Take tablet by mouth 15 minutes prior to sleep, for Sleep Study    Chronic hypercapnic respiratory failure (H), Hypoventilation associated with obesity syndrome (H), BiPAP (biphasic positive airway pressure) dependence       * Notice:  This list has 2 medication(s) that are the same as other medications prescribed for you. Read the directions carefully, and ask your doctor or other care provider to review them with you.

## 2018-10-30 ENCOUNTER — OFFICE VISIT (OUTPATIENT)
Dept: CARDIOLOGY | Facility: CLINIC | Age: 68
End: 2018-10-30
Attending: INTERNAL MEDICINE
Payer: COMMERCIAL

## 2018-10-30 ENCOUNTER — RADIANT APPOINTMENT (OUTPATIENT)
Dept: CARDIOLOGY | Facility: CLINIC | Age: 68
End: 2018-10-30
Attending: NURSE PRACTITIONER
Payer: COMMERCIAL

## 2018-10-30 VITALS
BODY MASS INDEX: 42.66 KG/M2 | OXYGEN SATURATION: 96 % | WEIGHT: 315 LBS | DIASTOLIC BLOOD PRESSURE: 65 MMHG | SYSTOLIC BLOOD PRESSURE: 98 MMHG | HEART RATE: 88 BPM | HEIGHT: 72 IN

## 2018-10-30 DIAGNOSIS — I50.30 (HFPEF) HEART FAILURE WITH PRESERVED EJECTION FRACTION (H): Primary | ICD-10-CM

## 2018-10-30 DIAGNOSIS — I50.30 (HFPEF) HEART FAILURE WITH PRESERVED EJECTION FRACTION (H): ICD-10-CM

## 2018-10-30 DIAGNOSIS — I48.91 ATRIAL FIBRILLATION, UNSPECIFIED TYPE (H): ICD-10-CM

## 2018-10-30 PROCEDURE — 99215 OFFICE O/P EST HI 40 MIN: CPT | Mod: ZP | Performed by: INTERNAL MEDICINE

## 2018-10-30 PROCEDURE — G0463 HOSPITAL OUTPT CLINIC VISIT: HCPCS | Mod: ZF

## 2018-10-30 RX ADMIN — Medication 5 ML: at 08:30

## 2018-10-30 ASSESSMENT — PAIN SCALES - GENERAL: PAINLEVEL: NO PAIN (0)

## 2018-10-30 NOTE — LETTER
10/30/2018      RE: Clarke Moreira  [address]  [facility] [address]       Dear Colleague,    Thank you for the opportunity to participate in the care of your patient, Clarke Moreira, at the [facility]. Please see a copy of my visit note below.    Christian Willoughby M.D.  Cardiovascular Medicine    I personally saw and examined this patient, discussed care with housestaff and other consultants, reviewed current laboratories and imaging studies, and conveyed impression and diagnostic/therapeutic plan to patient.    Problem List  1, Chronic systolic heart failure  2. Morbid obesity  3. Sleep disordered breathing  4. Atrial fibrillation  5. Hyperlipidemia  6. Multi-faceted shortness of breath  7. Anxiety/depression  8. Type II DM  9. Warfarin anticoagulation  10. Allergies: none  11. Lymphedema  12. Hypertension      Referring:     Matthias Sanchez M.D.  [facility]      History    69 year old male seen for evaluation and treatment regarding congestive heart failure in the context of morbid obesity.     He is having episodes of pre-syncope, faintness which are sometimes related to orthostatic stimuli such as position change or may occur post prandially.  The dureation is 20-30 seconds.  They do not occur daily.  They seem to occur in clusters interrupted by periods with no occurrences.  The often occur immediately following breakfast.  The patient has no palpitations, head trauma.  His lisinopril has been reduced from 10 to 5, and spironolactone also reduced.  He is on Flomax which he takes in evening.  Sleep study pending. TSH is within normal limits.    Patient lives alone, follows low sodium diet.  He is on 2L fluid restriction that he acknowledges that he is not following.              Occupational: middle management    Longstanding depression    Family: orphan    Allergy: none    Surgery: broken  bones    Has bariatric bed and chair - need maintenance    REVIEW of SYMPTOMS    Constitutional: without fever, chills, night sweats.  Weight is up 7 pounds over last several months  HEENT: without dry eyes, dry mouth, sinusitis, corryza, visual changes  Endocrine: without polyuria, polydypsia, polyphagia, heat or cold intolerance, changing mental status. Hemoglobin A!C modestly elevated  Cardiology: without chest pain, tightness, heaviness, pressure, paroxysmal dyspnea, orthopnea, palpitation, pre-syncope or syncope or device discharge (if present)  Pulmonary: without asthma, wheezing, cough, hemoptysis  GI: without nausea, emesis, jaundice, pain, hematemesis, melena  : without frequency, urgency, hematuria, stones, pain, abnormal bleeding, frequency, urgency but history of urinary retention requiring catherization   Neurologic: without TIA, CVA, trauma, seizure  Dermatologic: without lesions, abrasion rash,   Orthopedic/Rheum: without significant joint pain, impairment, limb, polyserositis, ulceration, Raynauds  Heme: without mass, bruising, frequent infection, anemia  Psychiatric: without substance abuse, hallucination, medication, depression    Exercise tolerance: reduced, walks with walker, home physical therapy    Objective  Constitutional: alert, oriented, normal abnormal  Gait. and station, normal mentation.  Oral: moist mucous membranes  Lymph: without pathologic adenopathy  Chest: clear to ausculation and percussion save for basilar rales  Cor: No evidence of left or right ventricular activity.  Rhythm is regular.  S1 normal, S2 split physiologically. Murmurs are not present  Abdomen: without tenderness, rebound, guarding, masses, ascites  Extremities: Chronic lymphedema  Neuro: no focal defects, normal mentation  Skin: without open lesions  Psych: oriented, verbal, mental status in tact      Wt Readings from Last 24 Encounters:   10/30/18 (!) 175.7 kg (387 lb 6.4 oz)   10/22/18 (!) 175.5 kg (387 lb)    10/10/18 (!) 173.3 kg (382 lb)   09/07/18 (!) 167.8 kg (369 lb 14.4 oz)   08/28/18 (!) 167.8 kg (370 lb)   07/18/18 (!) 170.1 kg (375 lb)   06/18/18 (!) 170.4 kg (375 lb 9.6 oz)   06/15/18 (!) 171.5 kg (378 lb 1.6 oz)   06/08/18 (!) 167.8 kg (369 lb 14.9 oz)   05/21/18 (!) 174.9 kg (385 lb 9.6 oz)   04/27/18 (!) 173.7 kg (383 lb)   04/26/18 (!) 173.7 kg (383 lb)   03/27/18 (!) 173.7 kg (383 lb)   02/26/18 (!) 172.4 kg (380 lb)   01/15/18 (!) 170.1 kg (375 lb)   01/10/18 (!) 168.3 kg (371 lb 1.6 oz)   12/26/17 (!) 198.5 kg (437 lb 9.6 oz)   12/21/17 (!) 191.3 kg (421 lb 12.8 oz)   12/04/17 (!) 185.5 kg (409 lb)   11/27/17 (!) 198.2 kg (437 lb)   11/10/17 (!) 196 kg (432 lb 3.2 oz)   10/27/17 (!) 181.9 kg (401 lb)   10/07/17 (!) 184 kg (405 lb 10.3 oz)   08/14/17 (!) 191 kg (421 lb)       Meds      Current Outpatient Prescriptions   Medication     ammonium lactate (LAC-HYDRIN) 12 % cream     Ascorbic Acid (VITAMIN C PO)     aspirin 81 MG tablet     atorvastatin (LIPITOR) 40 MG tablet     blood glucose monitoring (ONE TOUCH DELICA) lancets     blood glucose monitoring (ONE TOUCH ULTRA MINI) meter device kit     blood glucose monitoring (ONETOUCH ULTRA) test strip     levothyroxine (SYNTHROID/LEVOTHROID) 175 MCG tablet     lisinopril (PRINIVIL/ZESTRIL) 5 MG tablet     metFORMIN (GLUCOPHAGE) 500 MG tablet     metoprolol (TOPROL-XL) 100 MG 24 hr tablet     multivitamin, therapeutic with minerals (MULTI-VITAMIN) TABS tablet     nystatin (MYCOSTATIN) cream     omega 3 1000 MG CAPS     order for DME     order for DME     polyethylene glycol (MIRALAX/GLYCOLAX) Packet     potassium chloride SA (K-DUR/KLOR-CON M) 20 MEQ CR tablet     senna-docusate (SENOKOT-S;PERICOLACE) 8.6-50 MG per tablet     spironolactone (ALDACTONE) 25 MG tablet     tamsulosin (FLOMAX) 0.4 MG capsule     torsemide (DEMADEX) 100 MG tablet     vitamin B complex with vitamin C (VITAMIN  B COMPLEX) TABS tablet     VITAMIN E COMPLEX PO     warfarin  (COUMADIN) 5 MG tablet     zolpidem (AMBIEN) 5 MG tablet     No current facility-administered medications for this visit.        Labs    Results for AJIT CALLAHAN (MRN [id number]) as of 10/30/2018 09:22   Ref. Range 8/28/2018 13:58 8/28/2018 16:40 9/9/2018 11:11 10/22/2018 10:09 10/22/2018 12:46 10/30/2018 08:41   Sodium Latest Ref Range: 132.6 - 141.4 mmol/L 136.9   133.5     Potassium Latest Ref Range: 3.3 - 4.5 mmol/dL 4.3   4.0     Chloride Latest Ref Range: 98.0 - 110.0 mmol/L 99.7   98.2     Carbon Dioxide Latest Ref Range: 20.0 - 32.0 mmol/L 29.4   29.0     Urea Nitrogen Latest Ref Range: 7.0 - 21.0 mg/dL 34.5 (H)   23.6 (H)     Creatinine Latest Ref Range: 0.7 - 1.3 mg/dL 1.2   1.3     GFR Estimate Latest Ref Range: >60.0 mL/min/1.7 m2 64.0   58.3 (L)     GFR Estimate If Black Latest Ref Range: >60.0 mL/min/1.7 m2 77.4   70.6     Calcium Latest Ref Range: 8.5 - 10.1 mg/dL 9.4   9.6     Albumin Latest Ref Range: 3.5 - 4.7 mg/dL 4.4        Protein Total Latest Ref Range: 6.8 - 8.8 g/dL 7.6        Bilirubin Total Latest Ref Range: 0.2 - 1.3 mg/dL 0.7        Alkaline Phosphatase Latest Ref Range: 31.7 - 110.7 U/L 81.8        ALT Latest Ref Range: 0.0 - 45.0 U/L 18.0        AST Latest Ref Range: 0.0 - 55.0 U/L 18.0        Hemoglobin A1C Latest Ref Range: 4.1 - 5.7 % 5.7        Cholesterol Latest Ref Range: 0.0 - 200.0 mg/dL 120.3        Cholesterol/HDL Ratio Latest Ref Range: 0.0 - 5.0  2.7        HDL Cholesterol Latest Ref Range: >40.0 mg/dL 44.1        LDL Cholesterol Calculated Latest Ref Range: 0 - 129 mg/dL 55        VLDL Cholesterol Latest Ref Range: 7.0 - 32.0 mg/dL 21.4        N-Terminal Pro Bnp Latest Ref Range: 0 - 125 pg/mL  1231 (H)   1091 (H)    Occult Blood Scn FIT Latest Ref Range: NEG^Negative    Negative      Triglycerides Latest Ref Range: 0.0 - 150.0 mg/dL 107.0        Glucose Latest Ref Range: 70.0 - 99.0 mg'dL 99.4 (H)   114.1 (H)     INR Unknown 2.3   3.5     ECHO COMPLETE WITH OPTISON  Unknown      Rpt         Imaging   Name: AJIT CALLAHAN  MRN: [id number]  : 1950  Study Date: 2017 08:49 AM  Age: 67 yrs  Gender: Male  [place] [facility] [place] Study: Pulmonary Hypertension  Ordering Physician: LEWIS BALTAZAR  Performed By: Bernard Snider RDCS     BSA: 3.0 m2  Height: 71 in  Weight: 444 lb  HR: 110  BP: 87/61 mmHg  _____________________________________________________________________________  __        Procedure  Limited Portable Echo Adult. Contrast Lumason. Lumason (NDC #9165-7392-44)  given intravenously. Patient was given 5ml mixture of Lumason. 0 ml wasted.  _____________________________________________________________________________  __        Interpretation Summary  LVEF is probably normal on limited contrasct-enhanced images.  On limited views, RV function is probably mildly reduced.  Pulmonary artery systolic pressure cannot be assessed.  The inferior vena cava is dilated at 3.34 cm without respiratory variability, consistent with increased right atrial pressure. Estimated right atrial pressure is > 15 mmHg.  No pericardial effusion is present.  _____________________________________________________________________________    Exam: XR CHEST PORT 1 VW, 10/4/2017 9:39 AM  Weight around 404     Indication: Follow up CXR on pulmonary edema     Comparison: Chest radiograph 2017.     Findings:   Single AP view of the chest dated degrees. Right upper extremity PICC  in stable position with tip in the mid SVC. The visualized upper  abdomen is unremarkable. Left-sided rib fractures. Soft tissues are  unremarkable. The trachea is midline. The cardiomediastinal silhouette  is within normal limits. Interval resolution of bilateral pleural  effusions and associated bibasilar atelectasis and consolidation. Mild  interval decrease in pulmonary vascular congestion.         Impression:   1. Interval resolution bilateral pleural effusions and associated  bibasilar  atelectasis and/or consolidation.  2. Mild interval decrease in pulmonary vascular congestion.     I have personally reviewed the examination and initial interpretation  and I agree with the findings.    Clinical history: 74 year old man with AF, recovered tachycardia mediated cardiomyopathy and dilated aortic  root on TTE 4.2 cm. MRA for further evaluation.      Comparison MRA: None.      1. The aortic root is mildly dilated, measuring 3.9 cm at the level of sinus of Valsalva.     2. The ascending aorta measures 3.9 cm. The transverse arch and descending thoracic aorta are normal in  size without an aneurysm or dissection.     2. The aortic arch is left sided. The brachiocaphalic and left common carotid arteries both have common  origin. There is no coarctation.     3. The main and proximal branch pulmonary arteries are normal in size.      4. The systemic venous connections are normal.      CONCLUSIONS: The aortic         Assessment/Plan     The etiology of the patients heart failure is likely multi-factorial relating to abnormalities of relations, obesity, alveolar hypoventilation.    He should consider new humidifier    His weight is up 20 pounds and he feels he is retaining fluid in abdomen, not legs.  BUN 23 and BNP 1091  The patient's symptoms sound like low blood pressure, but we are trapped in paradox of fluid accumulation vs spells described above.  We need to exclude atrial fibrillation or other arrhythmia with ZeoPatch.   X 15 days      Echocardiogram reviewed (not read formally) demonstrates mildly enlarged LV and RV with normal systolic function and EF 65%    Please do not hesitate to contact me if you have any questions/concerns.     Sincerely,     Christian Willoughby MD      CC  Matthias Sanchez  [facility]  Dilia Greene, Ph.D. [employer]

## 2018-10-30 NOTE — MR AVS SNAPSHOT
After Visit Summary   10/30/2018    Clarke Moreira    MRN: [id number]           Patient Information     Date Of Birth          1950        Visit Information        Provider Department      10/30/2018 9:00 AM Christian Willoughby MD [facility]        Today's Diagnoses     (HFpEF) heart failure with preserved ejection fraction (H)    -  1    Atrial fibrillation, unspecified type (H)          Care Instructions    Thank you for your visit today.  Please call me with any questions or concerns.   Manas Argueta RN  Cardiology Care Coordinator  [phone], press option 1 then option 3          Follow-ups after your visit        Additional Services     Follow-Up with Cardiac Advanced Practice Provider       Please schedule a clinic visit with Sybil Villela in one month.            Follow-Up with Cardiologist       Please schedule a follow up with Sybil Villela in one month and with Dr. Willoughby in one year.                  Your next 10 appointments already scheduled     Nov 05, 2018  8:00 PM CST   PSG Titration w/TCM with SLEEP STUDY  5   [facility] ([facility])    [address] [place] [address] [phone]            Nov 06, 2018 10:00 AM CST   (Arrive by 9:45 AM)   ZIOPATCH MONITOR with  Cvc Monitor [facility] ([facility])    [address] [address]  [address] [place] [address] [phone]            Nov 12, 2018 10:40 AM CST   Return Visit with [facility] ([facility])    [address],  [address]  [facility] [address]   [phone]            Nov 13, 2018 11:20 AM CST   (Arrive by 11:05 AM)   [facility])    [address]  [address]  [facility] [address]    [phone]            Nov 16, 2018  2:20 PM CST   Return Sleep Patient with Renuka Gordon MD   [facility] ([facility])    [address] [place] [id number] [phone]            Nov 26, 2018 10:40 AM CST   Return Visit with Lety Buenrostro PsyD   [facility] ([facility])    [address],  [address]  [facility] [address]   [phone]            Dec 12, 2018 12:00 PM CST   (Arrive by 11:45 AM)   CORE RETURN with REAGAN Okeefe CNP   [facility] ([facility])    [address] [facility] [address]  [address]  [facility] [address] [phone]              Future tests that were ordered for you today     Open Future Orders        Priority Expected Expires Ordered    Follow-Up with Cardiac Advanced Practice Provider Routine 11/29/2018 10/30/2019 10/30/2018    Follow-Up with Cardiologist Routine 11/29/2018 10/30/2019 10/30/2018    Ziopatch Holter Monitor - Adult Routine  12/29/2018 10/30/2018            Who to contact     If you have questions or need follow up information about today's clinic visit or your schedule please contact [facility] directly at [phone].  Normal or non-critical lab and imaging results will be communicated to you by [employer], letter or phone within 4 business days after the clinic has received the results. If you do not hear from us within 7 days, please contact the clinic through [employer] or phone. If you have a critical or abnormal lab result, we will notify you by phone as soon as possible.  Submit refill requests through [employer] or call your pharmacy and they will forward the refill request to us. Please allow 3 business days for your refill to be completed.          Additional Information About Your Visit        [employer] Information     [employer] lets you send messages to your doctor, view your test results, renew  "your prescriptions, schedule appointments and more. To sign up, go to [email or web address] . Click on \"Log in\" on the left side of the screen, which will take you to the Welcome page. Then click on \"Sign up Now\" on the right side of the page.     You will be asked to enter the access code listed below, as well as some personal information. Please follow the directions to create your username and password.     Your access code is: [email or web address]  Expires: 1/15/2019  1:54 PM     Your access code will  in 90 days. If you need help or a new code, please call your [facility] clinic or [phone].        Care EveryWhere ID     This is your Care EveryWhere ID. This could be used by other organizations to access your [facility] medical records  [id number]        Your Vitals Were     Pulse Height Pulse Oximetry BMI (Body Mass Index)          88 1.829 m (6') 96% 52.54 kg/m2         Blood Pressure from Last 3 Encounters:   10/30/18 98/65   10/22/18 102/67   10/10/18 101/65    Weight from Last 3 Encounters:   10/30/18 (!) 175.7 kg (387 lb 6.4 oz)   10/22/18 (!) 175.5 kg (387 lb)   10/10/18 (!) 173.3 kg (382 lb)               Primary Care Provider Office Phone # Fax #    Matthias Sanchez [phone]       [address] [address]        Equal Access to Services     ELA RICO : Hadbeni enriquez Sodillan, waaxda luqadaha, qaybta kaalmada veronika, vasyl pa. So [facility] [phone].    ATENCIÓN: Si habla español, tiene a kim disposición servicios gratuitos de asistencia lingüística. Llame al [phone].    We comply with applicable federal civil rights laws and [place] laws. We do not discriminate on the basis of race, color, national origin, age, disability, sex, sexual orientation, or gender identity.            Thank you!     Thank you for choosing [facility]  for your care. Our goal is always to provide you with excellent care. " Hearing back from our patients is one way we can continue to improve our services. Please take a few minutes to complete the written survey that you may receive in the mail after your visit with us. Thank you!             Your Updated Medication List - Protect others around you: Learn how to safely use, store and throw away your medicines at www.disposemymeds.org.          This list is accurate as of 10/30/18 10:03 AM.  Always use your most recent med list.                   Brand Name Dispense Instructions for use Diagnosis    ammonium lactate 12 % cream    LAC-HYDRIN    385 g    Apply topically 2 times daily as needed for dry skin    Dry skin, Lichen of skin       aspirin 81 MG tablet     30 tablet    Take 1 tablet (81 mg) by mouth daily    Essential hypertension       atorvastatin 40 MG tablet    LIPITOR    30 tablet    Take 1 tablet (40 mg) by mouth daily    Type 2 diabetes mellitus without complication, without long-term current use of insulin (H)       blood glucose monitoring lancets     100 each    Use to test blood sugars 2 times daily or as directed.    Diabetes mellitus, type 2 (H)       blood glucose monitoring meter device kit     1 kit    Use to test blood sugars 2 times daily or as directed.    Type 2 diabetes mellitus with hyperglycemia, without long-term current use of insulin (H)       blood glucose monitoring test strip    ONETOUCH ULTRA    100 strip    Use to test blood sugars 2 times daily or as directed.    Diabetes mellitus, type 2 (H)       levothyroxine 175 MCG tablet    SYNTHROID/LEVOTHROID    30 tablet    Take 1 tablet (175 mcg) by mouth daily    Hypothyroidism, unspecified type       lisinopril 5 MG tablet    PRINIVIL/ZESTRIL    60 tablet    Take 1 tablet (5 mg) by mouth daily Take in Pm    Chronic systolic congestive heart failure (H)       metFORMIN 500 MG tablet    GLUCOPHAGE    60 tablet    Take 1 tablet (500 mg) by mouth 2 times daily (with meals)    Type 2 diabetes mellitus without  complication, without long-term current use of insulin (H)       metoprolol succinate 100 MG 24 hr tablet    TOPROL-XL    30 tablet    Take 1 tablet (100 mg) by mouth daily    Essential hypertension with goal blood pressure less than 140/90       multivitamin, therapeutic with minerals Tabs tablet     30 each    Take 1 tablet by mouth daily    Type 2 diabetes mellitus without complication, without long-term current use of insulin (H)       nystatin cream    MYCOSTATIN    30 g    Apply twice daily to affected area    Tinea pedis, unspecified laterality       omega 3 1000 MG Caps     30 capsule    Take 1 g by mouth daily    Hyperlipidemia LDL goal <100       * order for DME     2 each    Equipment being ordered: Other: Velcro Compression stockings.  Treatment Diagnosis: bilateral lymphedema.    Lymphedema of extremity       * order for DME     1 Units    Equipment being ordered: Bariatric Lift chair Diagnosis - morbid obesity, CHF, Lymphedema  Fax to Ne from [employer] at [phone].    Chronic systolic congestive heart failure (H), Lymphedema, Morbid obesity (H)       polyethylene glycol Packet    MIRALAX/GLYCOLAX    15 packet    Take 17 g by mouth daily as needed for constipation    Constipation, unspecified constipation type       potassium chloride SA 20 MEQ CR tablet    K-DUR/KLOR-CON M    90 tablet    Take 1 tablet (20 mEq) by mouth 2 times daily    Hypokalemia       senna-docusate 8.6-50 MG per tablet    SENOKOT-S;PERICOLACE    60 tablet    Take 1-2 tablets by mouth 2 times daily as needed for constipation    Constipation, unspecified constipation type       spironolactone 25 MG tablet    ALDACTONE    30 tablet    Take 1 tablet (25 mg) by mouth daily    Chronic systolic congestive heart failure (H)       tamsulosin 0.4 MG capsule    FLOMAX    30 capsule    Take 1 capsule (0.4 mg) by mouth daily    Urinary retention       torsemide 100 MG tablet    DEMADEX    60 tablet    Take 1 tablet (100 mg) by mouth 2  times daily    (HFpEF) heart failure with preserved ejection fraction (H)       vitamin B complex with vitamin C Tabs tablet      Take 1 tablet by mouth daily        VITAMIN C PO           VITAMIN E COMPLEX PO           warfarin 5 MG tablet    COUMADIN    75 tablet    Take 2.5 tablets (12.5 mg) by mouth daily    Persistent atrial fibrillation (H)       zolpidem 5 MG tablet    AMBIEN    1 tablet    Take tablet by mouth 15 minutes prior to sleep, for Sleep Study    Chronic hypercapnic respiratory failure (H), Hypoventilation associated with obesity syndrome (H), BiPAP (biphasic positive airway pressure) dependence       * Notice:  This list has 2 medication(s) that are the same as other medications prescribed for you. Read the directions carefully, and ask your doctor or other care provider to review them with you.

## 2018-10-30 NOTE — PATIENT INSTRUCTIONS
Thank you for your visit today.  Please call me with any questions or concerns.   Manas Argueta RN  Cardiology Care Coordinator  [phone], press option 1 then option 3

## 2018-10-30 NOTE — NURSING NOTE
Vitals completed successfully and medication reconciled. Fara Joyce MA  Chief Complaint   Patient presents with     Follow Up For     history of systolic heart failure, most recent LVEF normal, and atrial fibrillation.

## 2018-11-20 ENCOUNTER — TELEPHONE (OUTPATIENT)
Dept: FAMILY MEDICINE | Facility: CLINIC | Age: 68
End: 2018-11-20

## 2018-11-20 NOTE — TELEPHONE ENCOUNTER
[facility] phone call message- patient requesting to speak directly with PCP or provider.    PCP: Matthias Sanchez    Reason for Call:Report refusal of skilled nurse visit     Additional Details: Friday 11/16/18    Are you willing to speak with a nurse? Yes      Is a call back needed? Yes    Patient informed that it may take up to 2 business days to hear back from PCP:No    OK to leave a message on voice mail? Yes    Primary language: English      needed? No    Call taken on November 20, 2018 at 8:16 AM by Lindsay Gamble route to PCP unless willing to speak with a nurse.

## 2018-11-23 ENCOUNTER — CARE COORDINATION (OUTPATIENT)
Dept: CARDIOLOGY | Facility: CLINIC | Age: 68
End: 2018-11-23

## 2018-11-23 NOTE — PROGRESS NOTES
Called Clarke to check in as it was noted he weighed in on [employer] and weight was up. However, it is noted this is the first weight sent since 10/5/18. Reviewed chart and patient is down 6lb from clinic weight on 10/30/18. Patient reports he feels okay but that he continues to deal with episodes of low blood pressure. He reports that he does have symptoms of feeling dizzy/lightheaded when his blood pressure is low. He reports he believes these are related to his diet when his eating is not great; he reports that he sometimes forgets to eat in the mornings and then will eat large portions later in the day. He reports when he is eating proper portions throughout the day he does not notice these 'low blood pressure episodes.' Noted blood pressure is 101/50 today; patient reports he feels okay today. Encouraged patient to continue to use cardiocom scale every day; he reports he has difficulty with it as he feels ashamed when he gains weight. Reassured patient of importance of monitoring weight for his health. He states understanding and will continue to work on weighing himself daily. Encouraged him to call with worsening symptoms.

## 2018-12-01 DIAGNOSIS — I50.30 (HFPEF) HEART FAILURE WITH PRESERVED EJECTION FRACTION (H): Primary | ICD-10-CM

## 2018-12-01 DIAGNOSIS — I50.20 SYSTOLIC HF (HEART FAILURE) (H): Primary | ICD-10-CM

## 2018-12-03 ENCOUNTER — OFFICE VISIT (OUTPATIENT)
Dept: PSYCHOLOGY | Facility: CLINIC | Age: 68
End: 2018-12-03
Payer: COMMERCIAL

## 2018-12-03 DIAGNOSIS — E11.9 TYPE 2 DIABETES MELLITUS WITHOUT COMPLICATION, WITHOUT LONG-TERM CURRENT USE OF INSULIN (H): ICD-10-CM

## 2018-12-03 DIAGNOSIS — F41.1 GAD (GENERALIZED ANXIETY DISORDER): ICD-10-CM

## 2018-12-03 DIAGNOSIS — I10 ESSENTIAL HYPERTENSION WITH GOAL BLOOD PRESSURE LESS THAN 140/90: ICD-10-CM

## 2018-12-03 DIAGNOSIS — F33.1 DEPRESSION, MAJOR, RECURRENT, MODERATE (H): Primary | ICD-10-CM

## 2018-12-03 DIAGNOSIS — E78.5 HYPERLIPIDEMIA LDL GOAL <100: ICD-10-CM

## 2018-12-03 RX ORDER — METOPROLOL SUCCINATE 100 MG/1
100 TABLET, EXTENDED RELEASE ORAL DAILY
Qty: 30 TABLET | Refills: 11 | Status: SHIPPED | OUTPATIENT
Start: 2018-12-03 | End: 2018-12-12

## 2018-12-03 RX ORDER — OMEGA-3 FATTY ACIDS/FISH OIL 300-1000MG
1 CAPSULE ORAL DAILY
Qty: 30 CAPSULE | Refills: 11 | Status: ON HOLD | OUTPATIENT
Start: 2018-12-03 | End: 2019-03-16

## 2018-12-03 NOTE — MR AVS SNAPSHOT
After Visit Summary   12/3/2018    Clarke Moreira    MRN: [id number]           Patient Information     Date Of Birth          1950        Visit Information        Provider Department      12/3/2018 11:20 AM Lety Buenrostro PsyD [facility]        Today's Diagnoses     Depression, major, recurrent, moderate (H)    -  1    CHRIS (generalized anxiety disorder)           Follow-ups after your visit        Your next 10 appointments already scheduled     Dec 07, 2018 12:00 PM CST   (Arrive by 11:45 AM)   RETURN FOOT/ANKLE with Jesús Lagos DPM   [facility] ([facility])    [address]  4th Floor  [facility] [id number] [phone]            Dec 10, 2018 10:40 AM CST   Return Visit with [facility] ([facility])    [address] [place] [address],  [address]  [facility] [address]   [phone]            Dec 11, 2018 10:20 AM CST   Return Visit with MD [facility] ([facility])    [address],  [address]  [facility] [address]   [phone]            Dec 12, 2018 12:00 PM CST   (Arrive by 11:45 AM)   CORE RETURN with REAGAN Okeefe [facility] ([facility])    [address]  [address] [place] [id number] [phone]            Dec 17, 2018 10:40 AM CST   Return Visit with [facility] ([facility])    [address],  [address]  [facility] [address]   [phone]              Who to contact     Please call your clinic at [phone] to:    Ask questions about your health    Make or cancel appointments    Discuss your medicines    Learn about your test results    Speak to your doctor            Additional Information About Your Visit        Care EveryWhere ID     This is your Care  EveryWhere ID. This could be used by other organizations to access your [facility] medical records  [id number]         Blood Pressure from Last 3 Encounters:   10/30/18 98/65   10/22/18 102/67   10/10/18 101/65    Weight from Last 3 Encounters:   10/30/18 (!) 387 lb 6.4 oz (175.7 kg)   10/22/18 (!) 387 lb (175.5 kg)   10/10/18 (!) 382 lb (173.3 kg)              Today, you had the following     No orders found for display         Where to get your medicines      These medications were sent to [id number] - [place], [place] - [address]  [address] [facility] [address]     Phone:  [phone]     metFORMIN 500 MG tablet    metoprolol succinate  MG 24 hr tablet    omega 3 1000 MG Caps          Primary Care Provider Office Phone # Fax #    Matthias Sanchez [phone]       [address] [address]        Equal Access to Services     [facility]: Hadii kell ku hadasho Soomaali, waaxda luqadaha, qaybta kaalmada adeegyanacho, vasyl casarez . So [facility] [phone].    ATENCIÓN: Si habla español, tiene a kim disposición servicios gratuitos de asistencia lingüística. Kyleame al [phone].    We comply with applicable federal civil rights laws and [place] laws. We do not discriminate on the basis of race, color, national origin, age, disability, sex, sexual orientation, or gender identity.            Thank you!     Thank you for choosing [facility]  for your care. Our goal is always to provide you with excellent care. Hearing back from our patients is one way we can continue to improve our services. Please take a few minutes to complete the written survey that you may receive in the mail after your visit with us. Thank you!             Your Updated Medication List - Protect others around you: Learn how to safely use, store and throw away your medicines at www.disposemymeds.org.          This list is accurate as  of 12/3/18 11:59 PM.  Always use your most recent med list.                   Brand Name Dispense Instructions for use Diagnosis    ammonium lactate 12 % external cream    LAC-HYDRIN    385 g    Apply topically 2 times daily as needed for dry skin    Dry skin, Lichen of skin       aspirin 81 MG tablet    ASA    30 tablet    Take 1 tablet (81 mg) by mouth daily    Essential hypertension       atorvastatin 40 MG tablet    LIPITOR    30 tablet    Take 1 tablet (40 mg) by mouth daily    Type 2 diabetes mellitus without complication, without long-term current use of insulin (H)       blood glucose monitoring lancets     100 each    Use to test blood sugars 2 times daily or as directed.    Diabetes mellitus, type 2 (H)       blood glucose monitoring meter device kit     1 kit    Use to test blood sugars 2 times daily or as directed.    Type 2 diabetes mellitus with hyperglycemia, without long-term current use of insulin (H)       blood glucose monitoring test strip    ONETOUCH ULTRA    100 strip    Use to test blood sugars 2 times daily or as directed.    Diabetes mellitus, type 2 (H)       levothyroxine 175 MCG tablet    SYNTHROID/LEVOTHROID    30 tablet    Take 1 tablet (175 mcg) by mouth daily    Hypothyroidism, unspecified type       lisinopril 5 MG tablet    PRINIVIL/ZESTRIL    60 tablet    Take 1 tablet (5 mg) by mouth daily Take in Pm    Chronic systolic congestive heart failure (H)       metFORMIN 500 MG tablet    GLUCOPHAGE    60 tablet    Take 1 tablet (500 mg) by mouth 2 times daily (with meals)    Type 2 diabetes mellitus without complication, without long-term current use of insulin (H)       metoprolol succinate  MG 24 hr tablet    TOPROL-XL    30 tablet    Take 1 tablet (100 mg) by mouth daily    Essential hypertension with goal blood pressure less than 140/90       multivitamin w/minerals tablet     30 each    Take 1 tablet by mouth daily    Type 2 diabetes mellitus without complication, without  long-term current use of insulin (H)       nystatin 115936 UNIT/GM external cream    MYCOSTATIN    30 g    Apply twice daily to affected area    Tinea pedis, unspecified laterality       omega 3 1000 MG Caps     30 capsule    Take 1 g by mouth daily    Hyperlipidemia LDL goal <100       * order for DME     2 each    Equipment being ordered: Other: Velcro Compression stockings.  Treatment Diagnosis: bilateral lymphedema.    Lymphedema of extremity       * order for DME     1 Units    Equipment being ordered: Bariatric Lift chair Diagnosis - morbid obesity, CHF, Lymphedema  Fax to Ne from [employer] at [phone].    Chronic systolic congestive heart failure (H), Lymphedema, Morbid obesity (H)       polyethylene glycol packet    MIRALAX/GLYCOLAX    15 packet    Take 17 g by mouth daily as needed for constipation    Constipation, unspecified constipation type       potassium chloride ER 20 MEQ CR tablet    K-DUR/KLOR-CON M    90 tablet    Take 1 tablet (20 mEq) by mouth 2 times daily    Hypokalemia       senna-docusate 8.6-50 MG tablet    SENOKOT-S/PERICOLACE    60 tablet    Take 1-2 tablets by mouth 2 times daily as needed for constipation    Constipation, unspecified constipation type       spironolactone 25 MG tablet    ALDACTONE    30 tablet    Take 1 tablet (25 mg) by mouth daily    Chronic systolic congestive heart failure (H)       tamsulosin 0.4 MG capsule    FLOMAX    30 capsule    Take 1 capsule (0.4 mg) by mouth daily    Urinary retention       torsemide 100 MG tablet    DEMADEX    60 tablet    Take 1 tablet (100 mg) by mouth 2 times daily    (HFpEF) heart failure with preserved ejection fraction (H)       vitamin B complex with vitamin C tablet      Take 1 tablet by mouth daily        VITAMIN C PO           VITAMIN E COMPLEX PO           warfarin 5 MG tablet    COUMADIN    75 tablet    Take 2.5 tablets (12.5 mg) by mouth daily    Persistent atrial fibrillation (H)       zolpidem 5 MG tablet    AMBIEN     1 tablet    Take tablet by mouth 15 minutes prior to sleep, for Sleep Study    Chronic hypercapnic respiratory failure (H), Hypoventilation associated with obesity syndrome (H), BiPAP (biphasic positive airway pressure) dependence       * Notice:  This list has 2 medication(s) that are the same as other medications prescribed for you. Read the directions carefully, and ask your doctor or other care provider to review them with you.

## 2018-12-03 NOTE — TELEPHONE ENCOUNTER

## 2018-12-03 NOTE — PROGRESS NOTES
Behavioral Health Progress Note    Client Legal Name: Clarke Moreira   Client Preferred Name: Clarke   Service Type: Individual  Length of Visit: 65 minutes  Attendees: patient     Identifying Information and Presenting Problem:    The patient is a 68 year old American male who is being seen for problematic symptoms of depression, ongoing adjustment to medical illness, as well as social isolation.  He finds enjoyment and support from people on his healthcare team and worries that as he is getting better, which he does see as a positive thing, it means that he has less connection with other people.     Treatment Objective(s) Addressed in This Session:    reports one goal is maintaining healthy eating patterns, feels as if he is returning to old patterns before he went into the hospital and believes this is indicative of his mental disease, doesn't want to do physical activity because then he has to leave his home.    Progress on / Status of Treatment Objective(s) / Homework:  Clarke sees some hope looking forward, but also becomes very scared of this hope as he worries he will be disappointed.    PHQ-9 SCORE 8/23/2018 8/28/2018 10/22/2018   PHQ-9 Total Score - - -   PHQ-9 Total Score 24 24 21       CHRIS-7 SCORE 8/23/2018 8/28/2018 10/22/2018   Total Score - - -   Total Score 18 18 13     Topics Discussed/Interventions Provided:    Clarke had cancelled two appointments.  He thought he had an appt today, but appt is actually next week.  I had a cancellation so was still able to see him today.  Let him know I was going to call this week as I had not seen him in some time.  Clarke reports it is very difficult for him because he feels good during the appointments, but then mood state returns and he begins to worry about the next appt so he cancels.  He is disappointed that he has started to cancel appointments again.      Clarke perceives himself as completely reverting back to past behaviors and sees what is  "happening now being just like all of the other times he \"has messed things up.\"  Explored and reframed thoughts about these perceptions, as Clarke has described a number of instances that demonstrate that he is doing some things differently even though his mood is feeling more down.      Clarke shared that he decided he would send cards to a number of people that have been important in his life. He went to great efforts to gather cards up and was mentally writing the cards to the people.  When it came to actually writing, he never did it because he decided he could not convey on the cards what he had perfectly composed in his head.  Since he wasn't going to express himself perfectly, he felt as if the whole things meant nothing.  Gently challenged his thinking about this.  Provided other examples about when he allowed some vulnerability to come forth and changed how he did things how this brought about other positive situations/interactions in his life.  Discussed the importance of the cards isn't the message, but the fact that he was thinking of people and reaching out.  He is continuing to debate if he will send the cards or not.      Assessment: The patient appeared to be active and engaged in today's session and was receptive to feedback.    Mental Status: Clarke appeared generally alert and oriented. He was dressed in shorts, a shirt, and his biker vest.  He was well-groomed.  Pleasant and engaged in the conversation.  Eye contact was good, appropriate. Speech was of normal volume and rate and was clear, coherent, and relevant. Mood was sad at times, feels hopeless and delighted in certain things at the same time.  down, hopeless.  Tearful when trying to express some of the positive emotion he has experienced. Thought processes were relevant, logical and goal-directed. Thought content was WNL with no evidence of psychotic or paranoid features. No evidence of SI/HI or self-harm, intent, or plans. Memory " appeared grossly intact. Insight and judgment appeared good and patient exhibited good impulse control during the appointment.     Does the patient appear to be at imminent risk of harm to self/others at this time? No    The session was necessary to address ongoing feelings of disconnection and isolation from others, as well as deep seated sense of being unworthy. These symptoms of depression have been interfering with patient's ability to function at home and socially, he mostly homebound, coming out only for appointments, but has cancelled two appointments more recently which will be monitored for signs of increasing depression.  Ongoing psychotherapy is necessary to provide counseling and provide support.    Diagnosis (DSM-5):  Major Depression, recurrent, moderate  Generalized Anxiety Disorder  R/o persistent depressive disorder    Plan:  1. Follow up in 2 weeks.    NOTE: Treatment plan needed.  Diagnostic assessment update due 10/15/19.

## 2018-12-03 NOTE — TELEPHONE ENCOUNTER

## 2018-12-07 ENCOUNTER — TELEPHONE (OUTPATIENT)
Dept: FAMILY MEDICINE | Facility: CLINIC | Age: 68
End: 2018-12-07

## 2018-12-07 ENCOUNTER — CARE COORDINATION (OUTPATIENT)
Dept: CARDIOLOGY | Facility: CLINIC | Age: 68
End: 2018-12-07

## 2018-12-07 DIAGNOSIS — K59.00 CONSTIPATION, UNSPECIFIED CONSTIPATION TYPE: ICD-10-CM

## 2018-12-07 RX ORDER — AMOXICILLIN 250 MG
1-2 CAPSULE ORAL 2 TIMES DAILY PRN
Qty: 60 TABLET | Refills: 11 | Status: SHIPPED | OUTPATIENT
Start: 2018-12-07 | End: 2018-12-16

## 2018-12-07 NOTE — TELEPHONE ENCOUNTER
RN called and spoke to Stanley. After reviewing his cardiac history of heart failure and atrial fibrillation, and seeing the variation of heart rate values, RN recommended evaluation in the ED.  was in agreement with plan and is notifying patient.  Kyra Jackson RN on 12/7/2018 at 10:07 AM

## 2018-12-07 NOTE — TELEPHONE ENCOUNTER
"[facility] phone call message-patient reporting a symptom:     Symptom: High heart rate/BP    When did symptoms begin? Today    Characteristics: (location on body, intensity, what makes it better or worse, associated symptoms )      Additional Details When first checked heart rate was 155 with no symptoms and BP was 101/69, when checked ten minutes later heart rate was 123 and BP was 105/70, and last check for both was 114/72 and heart rate was 94. No symptoms with high heart rate. Weight was only slightly different that usual and pt's belly is slightly distended but not firm. Lungs are clear and no swelling in legs and feet. Would like to discuss toes appearance as well, stating that they appear \"nobby\". Also a dried blister on right lower shin.      Same Day Visit Offered: Yes, declined    Additional comments:     OK to leave message on voice mail? Yes    Advised patient that RN would call back within 3 hours, unless emergent   Primary language: English      needed? No    Call taken on December 7, 2018 at 9:53 AM by Amanda Dumont          "

## 2018-12-10 ENCOUNTER — OFFICE VISIT (OUTPATIENT)
Dept: PSYCHOLOGY | Facility: CLINIC | Age: 68
End: 2018-12-10
Payer: COMMERCIAL

## 2018-12-10 ENCOUNTER — TELEPHONE (OUTPATIENT)
Dept: CARDIOLOGY | Facility: CLINIC | Age: 68
End: 2018-12-10

## 2018-12-10 DIAGNOSIS — F33.1 MODERATE EPISODE OF RECURRENT MAJOR DEPRESSIVE DISORDER (H): Primary | ICD-10-CM

## 2018-12-10 DIAGNOSIS — F41.1 GAD (GENERALIZED ANXIETY DISORDER): ICD-10-CM

## 2018-12-10 NOTE — TELEPHONE ENCOUNTER
Called into pharmacy ok to fill when due per nancy conway           [facility]    Phone Message    May a detailed message be left on voicemail: yes    Reason for Call: Other: Pt was supposed to get a monitor on and it didn't work out before so pt is wondering if Dr would like him to get one on before his appt, please call to discuss     Action Taken: Message routed to:  [facility] ([facility] Cardiology Clinic

## 2018-12-10 NOTE — TELEPHONE ENCOUNTER
Patient was called and recommendation from Dr. Willoughby for patient to have a Zio Patch monitor left on voice mail with scheduling number. Patient encouraged to call to schedule or to call back with any questions or concerns.

## 2018-12-11 ENCOUNTER — OFFICE VISIT (OUTPATIENT)
Dept: FAMILY MEDICINE | Facility: CLINIC | Age: 68
End: 2018-12-11
Payer: COMMERCIAL

## 2018-12-11 VITALS
SYSTOLIC BLOOD PRESSURE: 86 MMHG | OXYGEN SATURATION: 95 % | WEIGHT: 315 LBS | HEART RATE: 90 BPM | BODY MASS INDEX: 52.7 KG/M2 | TEMPERATURE: 98.3 F | DIASTOLIC BLOOD PRESSURE: 57 MMHG | RESPIRATION RATE: 16 BRPM

## 2018-12-11 DIAGNOSIS — I48.91 ATRIAL FIBRILLATION, UNSPECIFIED TYPE (H): ICD-10-CM

## 2018-12-11 DIAGNOSIS — M79.675 PAIN OF TOE OF LEFT FOOT: ICD-10-CM

## 2018-12-11 DIAGNOSIS — R79.89 ELEVATED SERUM CREATININE: ICD-10-CM

## 2018-12-11 DIAGNOSIS — I10 ESSENTIAL HYPERTENSION WITH GOAL BLOOD PRESSURE LESS THAN 140/90: ICD-10-CM

## 2018-12-11 DIAGNOSIS — I50.30 (HFPEF) HEART FAILURE WITH PRESERVED EJECTION FRACTION (H): Primary | ICD-10-CM

## 2018-12-11 LAB
ALBUMIN SERPL-MCNC: 4.7 MG/DL (ref 3.5–4.7)
ALP SERPL-CCNC: 88.2 U/L (ref 31.7–110.7)
ALT SERPL-CCNC: 19.8 U/L (ref 0–45)
AST SERPL-CCNC: 20.7 U/L (ref 0–55)
BILIRUB SERPL-MCNC: 0.9 MG/DL (ref 0.2–1.3)
BUN SERPL-MCNC: 24.1 MG/DL (ref 7–21)
CALCIUM SERPL-MCNC: 10.4 MG/DL (ref 8.5–10.1)
CHLORIDE SERPLBLD-SCNC: 91.1 MMOL/L (ref 98–110)
CO2 SERPL-SCNC: 31.2 MMOL/L (ref 20–32)
CREAT SERPL-MCNC: 1.8 MG/DL (ref 0.7–1.3)
GFR SERPL CREATININE-BSD FRML MDRD: 40.1 ML/MIN/1.7 M2
GLUCOSE SERPL-MCNC: 120.7 MG'DL (ref 70–99)
HCT VFR BLD AUTO: 52.1 % (ref 40–53)
HEMOGLOBIN: 15.2 G/DL (ref 13.3–17.7)
INR PPP: 2.29 (ref 0.86–1.14)
MCH RBC QN AUTO: 29.5 PG (ref 26.5–35)
MCHC RBC AUTO-ENTMCNC: 29.2 G/DL (ref 32–36)
MCV RBC AUTO: 101 FL (ref 78–100)
NT-PROBNP SERPL-MCNC: 1389 PG/ML (ref 0–125)
PLATELET # BLD AUTO: 289 K/UL (ref 150–450)
POTASSIUM SERPL-SCNC: 4.3 MMOL/DL (ref 3.3–4.5)
PROT SERPL-MCNC: 8 G/DL (ref 6.8–8.8)
RBC # BLD AUTO: 5.16 M/UL (ref 4.4–5.9)
SODIUM SERPL-SCNC: 136.3 MMOL/L (ref 132.6–141.4)
WBC # BLD AUTO: 12.7 K/UL (ref 4–11)

## 2018-12-11 ASSESSMENT — PATIENT HEALTH QUESTIONNAIRE - PHQ9
SUM OF ALL RESPONSES TO PHQ QUESTIONS 1-9: 25
5. POOR APPETITE OR OVEREATING: NEARLY EVERY DAY

## 2018-12-11 ASSESSMENT — ANXIETY QUESTIONNAIRES
2. NOT BEING ABLE TO STOP OR CONTROL WORRYING: NEARLY EVERY DAY
GAD7 TOTAL SCORE: 20
5. BEING SO RESTLESS THAT IT IS HARD TO SIT STILL: MORE THAN HALF THE DAYS
3. WORRYING TOO MUCH ABOUT DIFFERENT THINGS: NEARLY EVERY DAY
7. FEELING AFRAID AS IF SOMETHING AWFUL MIGHT HAPPEN: NEARLY EVERY DAY
6. BECOMING EASILY ANNOYED OR IRRITABLE: NEARLY EVERY DAY
IF YOU CHECKED OFF ANY PROBLEMS ON THIS QUESTIONNAIRE, HOW DIFFICULT HAVE THESE PROBLEMS MADE IT FOR YOU TO DO YOUR WORK, TAKE CARE OF THINGS AT HOME, OR GET ALONG WITH OTHER PEOPLE: EXTREMELY DIFFICULT
1. FEELING NERVOUS, ANXIOUS, OR ON EDGE: NEARLY EVERY DAY

## 2018-12-11 NOTE — LETTER
December 20, 2018      Clarke Moreira  [address]  [facility] [address]        Dear Clarke,    Thank you for getting your care at [facility]. Please see below for your test results.  Your thyroid test was high (TSH) probably because you were out of medication.  Your BNP (heart failure) was also higher. Again maybe because of the medication shortage.      Resulted Orders   N terminal pro BNP outpatient   Result Value Ref Range    N-Terminal Pro Bnp 1,389 (H) 0 - 125 pg/mL      Comment:         Reference range shown and results flagged as abnormal are for the outpatient,   non acute settings. Establishing a baseline value for each individual patient   is useful for follow-up.  Suggested inpatient cut points for confirming diagnosis of CHF in an acute   setting are:   >450 pg/mL (age 18 to less than 50)   >900 pg/mL (age 50 to less than 75)   >1800 pg/mL (75 yrs and older)  An inpatient or emergency department NT-proPBNP <300 pg/mL effectively rules   out acute CHF, with 99% negative predictive value.      Comprehensive Metabolic Panel ([facility])   Result Value Ref Range    Albumin 4.7 3.5 - 4.7 mg/dL    Alkaline Phosphatase 88.2 31.7 - 110.7 U/L    ALT 19.8 0.0 - 45.0 U/L    AST 20.7 0.0 - 55.0 U/L    Bilirubin Total 0.9 0.2 - 1.3 mg/dL    Urea Nitrogen 24.1 (H) 7.0 - 21.0 mg/dL    Calcium 10.4 (H) 8.5 - 10.1 mg/dL    Chloride 91.1 (L) 98.0 - 110.0 mmol/L    Carbon Dioxide 31.2 20.0 - 32.0 mmol/L    Creatinine 1.8 (H) 0.7 - 1.3 mg/dL    Glucose 120.7 (H) 70.0 - 99.0 mg'dL    Potassium 4.3 3.3 - 4.5 mmol/dL    Sodium 136.3 132.6 - 141.4 mmol/L    Protein Total 8.0 6.8 - 8.8 g/dL    GFR Estimate 40.1 (L) >60.0 mL/min/1.7 m2    GFR Estimate If Black 48.5 (L) >60.0 mL/min/1.7 m2   INR   Result Value Ref Range    INR 2.29 (H) 0.86 - 1.14   CBC with Plt ([facility])   Result Value Ref Range    WBC 12.7 (H) 4.0 - 11.0 K/uL    RBC 5.16 4.40 - 5.90 M/uL    Hemoglobin 15.2 13.3 - 17.7 g/dL    Hematocrit  52.1 40.0 - 53.0 %    .0 (H) 78.0 - 100.0 fL    MCH 29.5 26.5 - 35.0 pg    MCHC 29.2 (L) 32.0 - 36.0 g/dL    Platelets 289.0 150.0 - 450.0 K/uL   TSH with free T4 reflex   Result Value Ref Range    TSH 11.20 (H) 0.40 - 4.00 mU/L   T4 free   Result Value Ref Range    T4 Free 1.21 0.76 - 1.46 ng/dL           Sincerely,    Matthias Sanchez MD

## 2018-12-11 NOTE — PROGRESS NOTES
HPI       Clarke Moreira is a 68 year old  who presents for   Chief Complaint   Patient presents with     RECHECK     CHF       1. Feet  ?gout  L great toe pain at IP (not MCP)    2. BP  ZioPatch tomorrow  No syncopal episodes  No pre-syncopal episodes  BP still very low    3. Pustule on R leg  Small patch anterior mid lower leg  Non tender      4. Meds issue  No thyroid med, metoprolol for 3-4 days  Pharmacy not delivering  Will involve SW      CHF  Stable  Has appt with [facility] clinic soon    Diabetic ulcer  resolved    A-fib / anticoagulation  Check INR today    Depression/PTSD  Meeting with Dr Buenrostro    Sleep apnea  Did not address today    DM  Check A1c today    HTN  Low. Likely stop lisinopril after [facility] visit    Lymphedema  Improved / now stable                 +++++++  Problem, Medication and Allergy Lists were   reviewed and updated if needed.     Patient Active Problem List    Diagnosis Date Noted     Pre-syncope 10/22/2018     Priority: Medium     Chronic hypercapnic respiratory failure (H) 10/10/2018     Priority: Medium     Hypoventilation associated with obesity syndrome (H) 10/10/2018     Priority: Medium     (HFpEF) heart failure with preserved ejection fraction (H) 06/04/2018     Priority: Medium     Onychomycosis 03/29/2018     Priority: Medium     Cellulitis 01/05/2018     Priority: Medium     BiPAP (biphasic positive airway pressure) dependence 11/10/2017     Priority: Medium     Lymphedema 11/10/2017     Priority: Medium     Chronic systolic congestive heart failure (H) 11/10/2017     Priority: Medium     Last Echo 2008 with EF 40-45%, but on 09/24/17 recheck was described as normal. There might be a diastolic component and most suitable be HFpEF. He is on lisinopril, lasix, Metoprolol at home.       Essential hypertension with goal blood pressure less than 140/90 11/10/2017     Priority: Medium     Urinary retention 11/10/2017     Priority: Medium     Constipation, unspecified  constipation type 11/10/2017     Priority: Medium     Hyperlipidemia LDL goal <100 09/23/2017     Priority: Medium     Fall 09/23/2017     Priority: Medium     Depression with anxiety 09/14/2016     Priority: Medium     Reclusive, difficulty leaving home       Morbid obesity (H) 08/03/2014     Priority: Medium     Type 2 diabetes mellitus with hyperglycemia, without long-term current use of insulin (H) 03/14/2013     Priority: Medium     Diagnosis 3/13 - A1c=6.7       Atrial fibrillation (H) 03/13/2013     Priority: Medium     Hypothyroidism 03/13/2013     Priority: Medium     Basal cell carcinoma of skin of face 03/13/2013     Priority: Medium     CTS (carpal tunnel syndrome) 03/13/2013     Priority: Medium     Myopia 03/13/2013     Priority: Medium     Plantar fasciitis 03/13/2013     Priority: Medium     Presbyopia 03/13/2013     Priority: Medium     Regular astigmatism 03/13/2013     Priority: Medium     Neoplasm of uncertain behavior of skin 03/13/2013     Priority: Medium     Venous stasis 03/13/2013     Priority: Medium         Current Outpatient Medications   Medication Sig Dispense Refill     ammonium lactate (LAC-HYDRIN) 12 % cream Apply topically 2 times daily as needed for dry skin 385 g 1     Ascorbic Acid (VITAMIN C PO)        aspirin 81 MG tablet Take 1 tablet (81 mg) by mouth daily 30 tablet 0     atorvastatin (LIPITOR) 40 MG tablet Take 1 tablet (40 mg) by mouth daily 30 tablet 11     blood glucose monitoring (ONE TOUCH DELICA) lancets Use to test blood sugars 2 times daily or as directed. 100 each 11     blood glucose monitoring (ONE TOUCH ULTRA MINI) meter device kit Use to test blood sugars 2 times daily or as directed. 1 kit 0     blood glucose monitoring (ONETOUCH ULTRA) test strip Use to test blood sugars 2 times daily or as directed. 100 strip 11     lisinopril (PRINIVIL/ZESTRIL) 5 MG tablet Take 1 tablet (5 mg) by mouth daily Take in Pm 60 tablet 3     metFORMIN (GLUCOPHAGE) 500 MG tablet  Take 1 tablet (500 mg) by mouth 2 times daily (with meals) 60 tablet 11     multivitamin, therapeutic with minerals (MULTI-VITAMIN) TABS tablet Take 1 tablet by mouth daily 30 each 11     nystatin (MYCOSTATIN) cream Apply twice daily to affected area 30 g 3     omega 3 1000 MG CAPS Take 1 g by mouth daily 30 capsule 11     order for DME Equipment being ordered: Bariatric Lift chair  Diagnosis - morbid obesity, CHF, Lymphedema    Fax to Ne from [employer] at [phone]. 1 Units 0     order for DME Equipment being ordered: Other: Velcro Compression stockings.   Treatment Diagnosis: bilateral lymphedema. 2 each 0     polyethylene glycol (MIRALAX/GLYCOLAX) Packet Take 17 g by mouth daily as needed for constipation 15 packet 11     potassium chloride SA (K-DUR/KLOR-CON M) 20 MEQ CR tablet Take 1 tablet (20 mEq) by mouth 2 times daily 90 tablet 11     senna-docusate (SENOKOT-S/PERICOLACE) 8.6-50 MG tablet Take 1-2 tablets by mouth 2 times daily as needed for constipation 60 tablet 11     spironolactone (ALDACTONE) 25 MG tablet Take 1 tablet (25 mg) by mouth daily 30 tablet 3     tamsulosin (FLOMAX) 0.4 MG capsule Take 1 capsule (0.4 mg) by mouth daily 30 capsule 11     torsemide (DEMADEX) 100 MG tablet Take 1 tablet (100 mg) by mouth 2 times daily 60 tablet 3     vitamin B complex with vitamin C (VITAMIN  B COMPLEX) TABS tablet Take 1 tablet by mouth daily       VITAMIN E COMPLEX PO        warfarin (COUMADIN) 5 MG tablet Take 2.5 tablets (12.5 mg) by mouth daily 75 tablet 11     levothyroxine (SYNTHROID/LEVOTHROID) 175 MCG tablet Take 1 tablet (175 mcg) by mouth daily (Patient not taking: Reported on 12/11/2018) 30 tablet 11     metoprolol succinate ER (TOPROL-XL) 100 MG 24 hr tablet Take 1 tablet (100 mg) by mouth daily (Patient not taking: Reported on 12/11/2018) 30 tablet 11     warfarin (COUMADIN) 10 MG tablet Take 1 tablet (10 mg) by mouth once for 1 dose 1 tablet 0     zolpidem (AMBIEN) 5 MG tablet Take tablet  by mouth 15 minutes prior to sleep, for Sleep Study (Patient not taking: Reported on 10/30/2018) 1 tablet 0       No Known Allergies     Patient Active Problem List    Diagnosis Date Noted     Pre-syncope 10/22/2018     Priority: Medium     Chronic hypercapnic respiratory failure (H) 10/10/2018     Priority: Medium     Hypoventilation associated with obesity syndrome (H) 10/10/2018     Priority: Medium     (HFpEF) heart failure with preserved ejection fraction (H) 06/04/2018     Priority: Medium     Onychomycosis 03/29/2018     Priority: Medium     Cellulitis 01/05/2018     Priority: Medium     BiPAP (biphasic positive airway pressure) dependence 11/10/2017     Priority: Medium     Lymphedema 11/10/2017     Priority: Medium     Chronic systolic congestive heart failure (H) 11/10/2017     Priority: Medium     Last Echo 2008 with EF 40-45%, but on 09/24/17 recheck was described as normal. There might be a diastolic component and most suitable be HFpEF. He is on lisinopril, lasix, Metoprolol at home.       Essential hypertension with goal blood pressure less than 140/90 11/10/2017     Priority: Medium     Urinary retention 11/10/2017     Priority: Medium     Constipation, unspecified constipation type 11/10/2017     Priority: Medium     Hyperlipidemia LDL goal <100 09/23/2017     Priority: Medium     Fall 09/23/2017     Priority: Medium     Depression with anxiety 09/14/2016     Priority: Medium     Reclusive, difficulty leaving home       Morbid obesity (H) 08/03/2014     Priority: Medium     Type 2 diabetes mellitus with hyperglycemia, without long-term current use of insulin (H) 03/14/2013     Priority: Medium     Diagnosis 3/13 - A1c=6.7       Atrial fibrillation (H) 03/13/2013     Priority: Medium     Hypothyroidism 03/13/2013     Priority: Medium     Basal cell carcinoma of skin of face 03/13/2013     Priority: Medium     CTS (carpal tunnel syndrome) 03/13/2013     Priority: Medium     Myopia 03/13/2013      Priority: Medium     Plantar fasciitis 03/13/2013     Priority: Medium     Presbyopia 03/13/2013     Priority: Medium     Regular astigmatism 03/13/2013     Priority: Medium     Neoplasm of uncertain behavior of skin 03/13/2013     Priority: Medium     Venous stasis 03/13/2013     Priority: Medium   ,     Current Outpatient Medications   Medication Sig Dispense Refill     ammonium lactate (LAC-HYDRIN) 12 % cream Apply topically 2 times daily as needed for dry skin 385 g 1     Ascorbic Acid (VITAMIN C PO)        aspirin 81 MG tablet Take 1 tablet (81 mg) by mouth daily 30 tablet 0     atorvastatin (LIPITOR) 40 MG tablet Take 1 tablet (40 mg) by mouth daily 30 tablet 11     blood glucose monitoring (ONE TOUCH DELICA) lancets Use to test blood sugars 2 times daily or as directed. 100 each 11     blood glucose monitoring (ONE TOUCH ULTRA MINI) meter device kit Use to test blood sugars 2 times daily or as directed. 1 kit 0     blood glucose monitoring (ONETOUCH ULTRA) test strip Use to test blood sugars 2 times daily or as directed. 100 strip 11     lisinopril (PRINIVIL/ZESTRIL) 5 MG tablet Take 1 tablet (5 mg) by mouth daily Take in Pm 60 tablet 3     metFORMIN (GLUCOPHAGE) 500 MG tablet Take 1 tablet (500 mg) by mouth 2 times daily (with meals) 60 tablet 11     multivitamin, therapeutic with minerals (MULTI-VITAMIN) TABS tablet Take 1 tablet by mouth daily 30 each 11     nystatin (MYCOSTATIN) cream Apply twice daily to affected area 30 g 3     omega 3 1000 MG CAPS Take 1 g by mouth daily 30 capsule 11     order for DME Equipment being ordered: Bariatric Lift chair  Diagnosis - morbid obesity, CHF, Lymphedema    Fax to Ne from [employer] at [phone]. 1 Units 0     order for DME Equipment being ordered: Other: Velcro Compression stockings.   Treatment Diagnosis: bilateral lymphedema. 2 each 0     polyethylene glycol (MIRALAX/GLYCOLAX) Packet Take 17 g by mouth daily as needed for constipation 15 packet 11      potassium chloride SA (K-DUR/KLOR-CON M) 20 MEQ CR tablet Take 1 tablet (20 mEq) by mouth 2 times daily 90 tablet 11     senna-docusate (SENOKOT-S/PERICOLACE) 8.6-50 MG tablet Take 1-2 tablets by mouth 2 times daily as needed for constipation 60 tablet 11     spironolactone (ALDACTONE) 25 MG tablet Take 1 tablet (25 mg) by mouth daily 30 tablet 3     tamsulosin (FLOMAX) 0.4 MG capsule Take 1 capsule (0.4 mg) by mouth daily 30 capsule 11     torsemide (DEMADEX) 100 MG tablet Take 1 tablet (100 mg) by mouth 2 times daily 60 tablet 3     vitamin B complex with vitamin C (VITAMIN  B COMPLEX) TABS tablet Take 1 tablet by mouth daily       VITAMIN E COMPLEX PO        warfarin (COUMADIN) 5 MG tablet Take 2.5 tablets (12.5 mg) by mouth daily 75 tablet 11     levothyroxine (SYNTHROID/LEVOTHROID) 175 MCG tablet Take 1 tablet (175 mcg) by mouth daily (Patient not taking: Reported on 12/11/2018) 30 tablet 11     metoprolol succinate ER (TOPROL-XL) 100 MG 24 hr tablet Take 1 tablet (100 mg) by mouth daily (Patient not taking: Reported on 12/11/2018) 30 tablet 11     warfarin (COUMADIN) 10 MG tablet Take 1 tablet (10 mg) by mouth once for 1 dose 1 tablet 0     zolpidem (AMBIEN) 5 MG tablet Take tablet by mouth 15 minutes prior to sleep, for Sleep Study (Patient not taking: Reported on 10/30/2018) 1 tablet 0   ,   No Known Allergies.    Patient is an established patient of this clinic..         Review of Systems:   Review of Systems  Negative ROS except as noted above in HPI         Physical Exam:     Vitals:    12/11/18 1030   BP: (!) 86/57   Pulse: 90   Resp: 16   Temp: 98.3  F (36.8  C)   TempSrc: Oral   SpO2: 95%   Weight: (!) 176.3 kg (388 lb 9.6 oz)     Body mass index is 52.7 kg/m .  Vital signs normal except Blood pressure 86/87    Wt Readings from Last 5 Encounters:   12/11/18 (!) 176.3 kg (388 lb 9.6 oz)   10/30/18 (!) 175.7 kg (387 lb 6.4 oz)   10/22/18 (!) 175.5 kg (387 lb)   10/10/18 (!) 173.3 kg (382 lb)   09/07/18  (!) 167.8 kg (369 lb 14.4 oz)     Wt - fluid vs over eating??     Physical Exam  Gen: no distress  Lungs: clear  Cor: irreg, no S3 S4 murmur or rub  Abd: soft, nontender, no HSM  Ext: no edema  Foot - L great toe deformity, no ulcers. Mildly tender IP joint. MTP nontender, nonswollen  Leg - 5mm sloughing skin on R shin.      Results:     Results for orders placed or performed in visit on 12/11/18   N terminal pro BNP outpatient   Result Value Ref Range    N-Terminal Pro Bnp 1,389 (H) 0 - 125 pg/mL   Comprehensive Metabolic Panel ([facility])   Result Value Ref Range    Albumin 4.7 3.5 - 4.7 mg/dL    Alkaline Phosphatase 88.2 31.7 - 110.7 U/L    ALT 19.8 0.0 - 45.0 U/L    AST 20.7 0.0 - 55.0 U/L    Bilirubin Total 0.9 0.2 - 1.3 mg/dL    Urea Nitrogen 24.1 (H) 7.0 - 21.0 mg/dL    Calcium 10.4 (H) 8.5 - 10.1 mg/dL    Chloride 91.1 (L) 98.0 - 110.0 mmol/L    Carbon Dioxide 31.2 20.0 - 32.0 mmol/L    Creatinine 1.8 (H) 0.7 - 1.3 mg/dL    Glucose 120.7 (H) 70.0 - 99.0 mg'dL    Potassium 4.3 3.3 - 4.5 mmol/dL    Sodium 136.3 132.6 - 141.4 mmol/L    Protein Total 8.0 6.8 - 8.8 g/dL    GFR Estimate 40.1 (L) >60.0 mL/min/1.7 m2    GFR Estimate If Black 48.5 (L) >60.0 mL/min/1.7 m2   INR   Result Value Ref Range    INR 2.29 (H) 0.86 - 1.14   CBC with Plt ([facility])   Result Value Ref Range    WBC 12.7 (H) 4.0 - 11.0 K/uL    RBC 5.16 4.40 - 5.90 M/uL    Hemoglobin 15.2 13.3 - 17.7 g/dL    Hematocrit 52.1 40.0 - 53.0 %    .0 (H) 78.0 - 100.0 fL    MCH 29.5 26.5 - 35.0 pg    MCHC 29.2 (L) 32.0 - 36.0 g/dL    Platelets 289.0 150.0 - 450.0 K/uL         Assessment and Plan      1. (HFpEF) heart failure with preserved ejection fraction (H)  Wt up  BNP up slightly  Creat up    - N terminal pro BNP outpatient  - Comprehensive Metabolic Panel ([facility])  - CBC with Plt ([facility])  - TSH with free T4 reflex    2. Essential hypertension with goal blood pressure less than 140/90  Still very low  No pre-syncopal episodes  Getting ziopatch  in AM  [facility] clinic considering stopping Lisinopril and reducing spironolactone more.      3. Elevated serum creatinine  Creat 1.8 (new). Monitor    4. Atrial fibrillation, unspecified type (H)  - INR    5. Pain of toe of left foot  Doubt gout.  Monitor    6. RTC 1 month             Options for treatment and follow-up care were reviewed with the patient. Clarke Moreira  engaged in the decision making process and verbalized understanding of the options discussed and agreed with the final plan.    Matthias Sanchez MD    40min spent with the patient, >50% in arranging care for multiple issues  Jack

## 2018-12-11 NOTE — Clinical Note
PeggyPatient having trouble getting meds filled. Pharmacy states he needs new Rx. Our system says he has plenty of refills. He has been without some meds for 4 days now.Please call him and hear is understanding of the problem and sort out.Thanks.Matthew

## 2018-12-12 ENCOUNTER — OFFICE VISIT (OUTPATIENT)
Dept: CARDIOLOGY | Facility: CLINIC | Age: 68
End: 2018-12-12
Attending: NURSE PRACTITIONER
Payer: COMMERCIAL

## 2018-12-12 ENCOUNTER — TRANSFERRED RECORDS (OUTPATIENT)
Dept: HEALTH INFORMATION MANAGEMENT | Facility: CLINIC | Age: 68
End: 2018-12-12

## 2018-12-12 VITALS
OXYGEN SATURATION: 96 % | SYSTOLIC BLOOD PRESSURE: 112 MMHG | HEART RATE: 124 BPM | BODY MASS INDEX: 44.1 KG/M2 | HEIGHT: 71 IN | WEIGHT: 315 LBS | DIASTOLIC BLOOD PRESSURE: 89 MMHG

## 2018-12-12 DIAGNOSIS — E87.6 HYPOKALEMIA: ICD-10-CM

## 2018-12-12 DIAGNOSIS — I10 ESSENTIAL HYPERTENSION WITH GOAL BLOOD PRESSURE LESS THAN 140/90: ICD-10-CM

## 2018-12-12 DIAGNOSIS — I48.91 ATRIAL FIBRILLATION, UNSPECIFIED TYPE (H): ICD-10-CM

## 2018-12-12 DIAGNOSIS — I50.30 (HFPEF) HEART FAILURE WITH PRESERVED EJECTION FRACTION (H): Primary | ICD-10-CM

## 2018-12-12 PROBLEM — R79.89 ELEVATED SERUM CREATININE: Status: ACTIVE | Noted: 2018-12-12

## 2018-12-12 PROCEDURE — G0463 HOSPITAL OUTPT CLINIC VISIT: HCPCS | Mod: ZF

## 2018-12-12 PROCEDURE — 99214 OFFICE O/P EST MOD 30 MIN: CPT | Mod: ZP | Performed by: NURSE PRACTITIONER

## 2018-12-12 PROCEDURE — 93010 ELECTROCARDIOGRAM REPORT: CPT | Mod: ZP | Performed by: INTERNAL MEDICINE

## 2018-12-12 PROCEDURE — 0298T ZZC EXT ECG > 48HR TO 21 DAY REVIEW AND INTERPRETATN: CPT | Performed by: INTERNAL MEDICINE

## 2018-12-12 RX ORDER — POTASSIUM CHLORIDE 1500 MG/1
40 TABLET, EXTENDED RELEASE ORAL 2 TIMES DAILY
Qty: 90 TABLET | Refills: 11 | Status: SHIPPED | OUTPATIENT
Start: 2018-12-12 | End: 2018-12-12

## 2018-12-12 RX ORDER — POTASSIUM CHLORIDE 1.5 G/1.58G
40 POWDER, FOR SOLUTION ORAL 2 TIMES DAILY
Qty: 360 PACKET | Refills: 3 | Status: ON HOLD | OUTPATIENT
Start: 2018-12-12 | End: 2019-03-16

## 2018-12-12 RX ORDER — TORSEMIDE 100 MG/1
150 TABLET ORAL
Qty: 90 TABLET | Refills: 3 | Status: ON HOLD | OUTPATIENT
Start: 2018-12-12 | End: 2019-03-16

## 2018-12-12 RX ORDER — METOLAZONE 5 MG/1
5 TABLET ORAL ONCE
Qty: 10 TABLET | Refills: 0 | Status: SHIPPED | OUTPATIENT
Start: 2018-12-12 | End: 2019-02-19

## 2018-12-12 RX ORDER — METOPROLOL SUCCINATE 100 MG/1
50 TABLET, EXTENDED RELEASE ORAL DAILY
Qty: 30 TABLET | Refills: 11 | Status: ON HOLD | OUTPATIENT
Start: 2018-12-12 | End: 2019-03-16

## 2018-12-12 ASSESSMENT — ANXIETY QUESTIONNAIRES: GAD7 TOTAL SCORE: 20

## 2018-12-12 ASSESSMENT — PAIN SCALES - GENERAL: PAINLEVEL: NO PAIN (0)

## 2018-12-12 ASSESSMENT — MIFFLIN-ST. JEOR: SCORE: 2543.01

## 2018-12-12 NOTE — PATIENT INSTRUCTIONS
Cardiology Providers you saw during your visit:  Alisha Norman NP      Results for AJIT CALLAHAN (MRN [id number]) as of 12/12/2018 11:22   Ref. Range 12/11/2018 11:25   Sodium Latest Ref Range: 132.6 - 141.4 mmol/L 136.3   Potassium Latest Ref Range: 3.3 - 4.5 mmol/dL 4.3   Chloride Latest Ref Range: 98.0 - 110.0 mmol/L 91.1 (L)   Carbon Dioxide Latest Ref Range: 20.0 - 32.0 mmol/L 31.2   Urea Nitrogen Latest Ref Range: 7.0 - 21.0 mg/dL 24.1 (H)   Creatinine Latest Ref Range: 0.7 - 1.3 mg/dL 1.8 (H)   GFR Estimate Latest Ref Range: >60.0 mL/min/1.7 m2 40.1 (L)   GFR Estimate If Black Latest Ref Range: >60.0 mL/min/1.7 m2 48.5 (L)   Calcium Latest Ref Range: 8.5 - 10.1 mg/dL 10.4 (H)   Albumin Latest Ref Range: 3.5 - 4.7 mg/dL 4.7   Protein Total Latest Ref Range: 6.8 - 8.8 g/dL 8.0   Bilirubin Total Latest Ref Range: 0.2 - 1.3 mg/dL 0.9   Alkaline Phosphatase Latest Ref Range: 31.7 - 110.7 U/L 88.2   ALT Latest Ref Range: 0.0 - 45.0 U/L 19.8   AST Latest Ref Range: 0.0 - 55.0 U/L 20.7   N-Terminal Pro Bnp Latest Ref Range: 0 - 125 pg/mL 1,389 (H)   Glucose Latest Ref Range: 70.0 - 99.0 mg'dL 120.7 (H)   WBC Latest Ref Range: 4.0 - 11.0 K/uL 12.7 (H)   Hemoglobin Latest Ref Range: 13.3 - 17.7 g/dL 15.2   Hematocrit Latest Ref Range: 40.0 - 53.0 % 52.1   Platelets Latest Ref Range: 150.0 - 450.0 K/uL 289.0   RBC Latest Ref Range: 4.40 - 5.90 M/uL 5.16   MCV Latest Ref Range: 78.0 - 100.0 fL 101.0 (H)   MCH Latest Ref Range: 26.5 - 35.0 pg 29.5   MCHC Latest Ref Range: 32.0 - 36.0 g/dL 29.2 (L)   INR Latest Ref Range: 0.86 - 1.14  2.29 (H)     Medication changes:  1. START taking metoprolol 50 mg.   2. START taking 50 mg of torsemide twice a day.   3. Take 1 tab of metolazone when you get it from the pharmacy.   4. INCREASE potassium to 40 mEq twice a day.       Follow up:    Please return to clinic for follow-up: Lab draw at the end of next week. Follow-up with [facility] in 2 weeks.        Please call if you have:  1.  Weight gain of more than 2 pounds in a day or 5 pounds in a week  2. Increased shortness of breath, swelling or bloating  3. Dizziness, lightheadedness   4. Any questions or concerns.     Follow the American Heart Association Diet and Lifestyle recommendations:  Limit saturated fat, trans fat, sodium, red meat, sweets and sugar-sweetened beverages. If you choose to eat red meat, compare labels and select the leanest cuts available.  Aim for at least 150 minutes of moderate physical activity or 75 minutes of vigorous physical activity - or an equal combination of both - each week.    During business hours: [phone], press option # 1 to be routed to the [facility] then option # 3 for medical questions to speak with a nurse.     After hours, weekends or holidays: On Call Cardiologist- [phone]   option #4 and ask to speak to the on-call Cardiologist. Inform them you are a C[facility]/heart failure patient at the [facility].      Laura Phan, RN  Cardiology Nurse Care Coordinator    Keep up the good work!    Take Care!

## 2018-12-12 NOTE — LETTER
"12/12/2018      RE: Clarke Moreira  [address]  [facility] [id number]       Dear Colleague,    Thank you for the opportunity to participate in the care of your patient, Clarke Moreira, at the [facility] HEART [facility]. Please see a copy of my visit note below.    HPI:   Mr. Moreira is a 68 year old male with a past medical history including prior history of systolic heart failure, most recent LVEF normal, also hx of morbid obesity, atrial fibrillation s/p ablation 2008, THONG/alveolar hypoventilation, HTN, dyslipidemia, PAD, hypothyroidism, type2 DM, depression, anxiety, BPH. Presents to clinic for CORE follow-up. Patient was last admitted 6/4 as a planned admission for RHC and coronary angiogram. He was noted to be volume overloaded on day of admission so was diuresed ~20 lb before RHC which then showed RA 10, PCWP 10, normal cardiac outputs. His DC weight was 370 lb. Last visit in CORE he was feeling great and at a dry weight. He was seen by Dr Willoughby recently and noted to be hypotensive. ziopatch ordered to eval for arrhythmia but patient was unable to wait in clinic for it to be set up.     Since then, he reports steady weight gain. He feels LE edema is stable. Denies orthopnea, PND, cough, he wears CPAP. Functionally very limited due to body size but denies chest pain or shortness of breath with current level of exertion. States LE weakness limits abilities. He states BP at home 128-145 systolic, seems like BPs described to Dr Willoughby last visit were actually diastolics (\"in the 70s\"). He feels faintish sometimes if sitting too long but denies palpitations, presyncope, syncope, orthostasis. Weight at home today 382 lb. Patient reports having issue with pharmacy recently so has been out of metoprolol and synthroid for 9 days.     PAST MEDICAL HISTORY:  Past Medical History:   Diagnosis Date     Atrial fibrillation (H)      Basal cell " carcinoma      Chronic anticoagulation      Diastolic heart failure (H)      Dyslipidemia      Essential hypertension      Morbid obesity (H)      Sleep-disordered breathing      Type 2 diabetes mellitus (H)        FAMILY HISTORY:  Family History   Problem Relation Age of Onset     Depression Mother      Glaucoma Maternal Grandfather      Cancer No family hx of         No family history of skin cancer     Macular Degeneration No family hx of        SOCIAL HISTORY:  Social History     Socioeconomic History     Marital status: Single     Spouse name: Not on file     Number of children: Not on file     Years of education: Not on file     Highest education level: Not on file   Social Needs     Financial resource strain: Not on file     Food insecurity - worry: Not on file     Food insecurity - inability: Not on file     Transportation needs - medical: Not on file     Transportation needs - non-medical: Not on file   Occupational History     Not on file   Tobacco Use     Smoking status: Never Smoker     Smokeless tobacco: Never Used   Substance and Sexual Activity     Alcohol use: No     Comment: Former alcohol abuse. Quit 70s then quit later in 80s-present     Drug use: No     Comment: history of LSD use     Sexual activity: Not on file   Other Topics Concern     Parent/sibling w/ CABG, MI or angioplasty before 65F 55M? Not Asked   Social History Narrative    Lives in an apartment in 16th floor near hospital.  Has elevators, unable to use stairs. Not able to shop; has things delivered to him including food. Difficulty completing cleaning. Goes to PCP once yearly for annual check up and medication review.       CURRENT MEDICATIONS:    Current Outpatient Medications on File Prior to Visit:  ammonium lactate (LAC-HYDRIN) 12 % cream Apply topically 2 times daily as needed for dry skin   Ascorbic Acid (VITAMIN C PO)    aspirin 81 MG tablet Take 1 tablet (81 mg) by mouth daily   atorvastatin (LIPITOR) 40 MG tablet Take 1  tablet (40 mg) by mouth daily   blood glucose monitoring (ONE TOUCH DELICA) lancets Use to test blood sugars 2 times daily or as directed.   blood glucose monitoring (ONE TOUCH ULTRA MINI) meter device kit Use to test blood sugars 2 times daily or as directed.   blood glucose monitoring (ONETOUCH ULTRA) test strip Use to test blood sugars 2 times daily or as directed.   levothyroxine (SYNTHROID/LEVOTHROID) 175 MCG tablet Take 1 tablet (175 mcg) by mouth daily   lisinopril (PRINIVIL/ZESTRIL) 5 MG tablet Take 1 tablet (5 mg) by mouth daily Take in Pm   metFORMIN (GLUCOPHAGE) 500 MG tablet Take 1 tablet (500 mg) by mouth 2 times daily (with meals)   metoprolol succinate ER (TOPROL-XL) 100 MG 24 hr tablet Take 1 tablet (100 mg) by mouth daily   multivitamin, therapeutic with minerals (MULTI-VITAMIN) TABS tablet Take 1 tablet by mouth daily   nystatin (MYCOSTATIN) cream Apply twice daily to affected area   omega 3 1000 MG CAPS Take 1 g by mouth daily   order for DME Equipment being ordered: Bariatric Lift chairDiagnosis - morbid obesity, CHF, LymphedemaFax to Ne from [employer] at [phone].   order for DME Equipment being ordered: Other: Velcro Compression stockings. Treatment Diagnosis: bilateral lymphedema.   polyethylene glycol (MIRALAX/GLYCOLAX) Packet Take 17 g by mouth daily as needed for constipation   potassium chloride SA (K-DUR/KLOR-CON M) 20 MEQ CR tablet Take 1 tablet (20 mEq) by mouth 2 times daily   senna-docusate (SENOKOT-S/PERICOLACE) 8.6-50 MG tablet Take 1-2 tablets by mouth 2 times daily as needed for constipation   spironolactone (ALDACTONE) 25 MG tablet Take 1 tablet (25 mg) by mouth daily   tamsulosin (FLOMAX) 0.4 MG capsule Take 1 capsule (0.4 mg) by mouth daily   torsemide (DEMADEX) 100 MG tablet Take 1 tablet (100 mg) by mouth 2 times daily   vitamin B complex with vitamin C (VITAMIN  B COMPLEX) TABS tablet Take 1 tablet by mouth daily   VITAMIN E COMPLEX PO    warfarin (COUMADIN) 5 MG  "tablet Take 2.5 tablets (12.5 mg) by mouth daily   warfarin (COUMADIN) 10 MG tablet Take 1 tablet (10 mg) by mouth once for 1 dose   zolpidem (AMBIEN) 5 MG tablet Take tablet by mouth 15 minutes prior to sleep, for Sleep Study (Patient not taking: Reported on 10/30/2018)     No current facility-administered medications on file prior to visit.     ROS:   CONSTITUTIONAL: Denies fever, chills, fatigue. +Weight gain.  HEENT: Denies headache, vision changes, and changes in speech.   CV: Refer to HPI.   PULMONARY:Refer to HPI.   GI:Denies nausea, vomiting, diarrhea, and abdominal pain. Bowel movements are regular.   :Denies urinary alterations, dysuria, urinary frequency, hematuria, and abnormal drainage.   EXT:+chronic lower extremity edema.   SKIN:Denies abnormal rashes or lesions.   MUSCULOSKELETAL:Denies upper or lower extremity weakness and pain.   NEUROLOGIC:Denies lightheadedness, dizziness, seizures, or upper or lower extremity paresthesia.     EXAM:  /89 (BP Location: Left arm, Patient Position: Chair, Cuff Size: Adult Regular)   Pulse 124   Ht 1.803 m (5' 11\")   Wt (!) 175.1 kg (386 lb)   SpO2 96%   BMI 53.84 kg/m        GENERAL: Appears comfortable, in no acute distress.   HEENT: Eye symmetrical, no discharge or icterus bilaterally. Mucous membranes moist and without lesions.  CV: Tachycardic and irregular, distant S1S2, no appreciable murmur, rub, or gallop. JVP difficult to appreciate but elevated (examined in WC).   RESPIRATORY: Respirations regular, even, and unlabored. Lungs CTA throughout.   GI: Soft, obese, and non distended with normoactive bowel sounds present in all quadrants. No tenderness, rebound, guarding. No hepatomegaly.   EXTREMITIES: 1-2+bilat peripheral edema. 2+ bilateral pedal pulses.   NEUROLOGIC: Alert and oriented x 3. No focal deficits.   MUSCULOSKELETAL: No joint swelling or tenderness.   SKIN: No jaundice. No rashes.     Labs, reviewed with patient in clinic today:  CBC " RESULTS:  Lab Results   Component Value Date    WBC 9.3 06/04/2018    RBC 4.37 (L) 06/04/2018    HGB 15.2 12/11/2018    HCT 52.1 12/11/2018    .0 (H) 12/11/2018    MCH 29.5 12/11/2018    MCHC 29.2 (L) 12/11/2018    RDW 13.7 06/04/2018     06/04/2018       CMP RESULTS:  Lab Results   Component Value Date    .3 12/11/2018    POTASSIUM 4.3 12/11/2018    CHLORIDE 91.1 (L) 12/11/2018    CO2 31.2 12/11/2018    ANIONGAP 7 07/18/2018    .7 (H) 12/11/2018    BUN 24.1 (H) 12/11/2018    CR 1.8 (H) 12/11/2018    GFRESTIMATED 40.1 (L) 12/11/2018    GFRESTBLACK 48.5 (L) 12/11/2018    CHERI 10.4 (H) 12/11/2018    BILITOTAL 0.9 12/11/2018    ALBUMIN 3.3 (L) 06/04/2018    ALKPHOS 88.2 12/11/2018    ALT 19.8 12/11/2018    AST 20.7 12/11/2018        INR RESULTS:  Lab Results   Component Value Date    INR 2.29 (H) 12/11/2018       Lab Results   Component Value Date    MAG 2.3 06/08/2018     Lab Results   Component Value Date    NTBNPI 1,621 (H) 06/04/2018     Lab Results   Component Value Date    NTBNP 1,389 (H) 12/11/2018       Diagnostics:  ECG 1/6/18  Atrial fibrillation with PVCs, multifocal    TTE 10/30/18  Global and regional left ventricular function is normal with an EF of 55-60%.  No regional wall motion abnormalities are seen.  Mild to moderate right ventricular dilation is present. Global right  ventricular function is mildly to moderately reduced.  No significant valve abnormalities.  Dilation of the inferior vena cava is present with normal respiratory  variation in diameter. Trivial pericardial effusion is present with no  hemodynamic significance.     This study was compared with the study from 05/18/2018. RV apopears somewhat  more dilated but otherwise no significant change.    [facility] 6/6/18        Assessment and Plan:   Mr. Moreira is a 68 year old male with prior history of systolic heart failure, most recent LVEF normal, also hx of morbid obesity, atrial fibrillation, THONG/alveolar  hypoventilation, HTN who presents to CORE clinic for follow-up. He is fluid up today and in afib with RVR in the setting of missed Toprol doses. Plan below discussed with Dr Willoughby.    # Chronic borderline diastolic heart failure/HFpEF with improved EF  Stage C. NYHA Class III.    Fluid status: hypervolemic, metolazone 5 mg once today and torsemide 50 mg bid, increase kcl  ACEi/ARB/ARNI: lisinopril 5 mg daily at HS   BB: resume Toprol 1/2 dose 50 mg daily for HTN and rate control, increase back to 100 mg next visit if euvolemic  Aldosterone antagonist: spironolactone 25 mg bid (despite normal EF now, some benefit shown in TOPCAT trial)  SCD prophylaxis: n/a EF>40%  Ischemic evaluation: negative coronary angiogram 6/2018  Remote monitoring: uses [employer]     # Afib with RVR  ECG today confirms. Relatively asymptomatic and hemodynamically stable. In the setting of missed BB dose. Will have him resume Toprol 50 mg daily (prior dose 100 mg but he has been without it for 9 days and decompensated). Close follow-up.     # HTN  -controlled on HF therapies    # Atrial fibrillation ?permanant, hx of ablation per patient   [id number]   -he is on warfarin and Toprol  -overdue to see EP, referral placed    # THONG/alveolar hypoventilation  -encouraged consistent CPAP use       Follow up in CORE in two weeks      25 minutes spent face-to-face with patient, >50% in counseling and/or coordination of care as described above      Alisha Norman DNP, NP-C  12/12/2018        GEOFFREY BE

## 2018-12-12 NOTE — NURSING NOTE
Per Dr. Christian Willoughby, patient to have 14 day Ziopatch monitor placed.  Diagnosis: Atrial fibrillation, unspecified  Monitor placed: Yes  Patient Instructed: Yes  Patient verbalized understanding: Yes  Holter # [id number]  Placed by Rachel Ferrell CMA

## 2018-12-12 NOTE — PROGRESS NOTES
"HPI:   Mr. Moreira is a 68 year old male with a past medical history including prior history of systolic heart failure, most recent LVEF normal, also hx of morbid obesity, atrial fibrillation s/p ablation 2008, THONG/alveolar hypoventilation, HTN, dyslipidemia, PAD, hypothyroidism, type2 DM, depression, anxiety, BPH. Presents to clinic for CORE follow-up. Patient was last admitted 6/4 as a planned admission for RHC and coronary angiogram. He was noted to be volume overloaded on day of admission so was diuresed ~20 lb before RHC which then showed RA 10, PCWP 10, normal cardiac outputs. His DC weight was 370 lb. Last visit in CORE he was feeling great and at a dry weight. He was seen by Dr Willoughby recently and noted to be hypotensive. ziopatch ordered to eval for arrhythmia but patient was unable to wait in clinic for it to be set up.     Since then, he reports steady weight gain. He feels LE edema is stable. Denies orthopnea, PND, cough, he wears CPAP. Functionally very limited due to body size but denies chest pain or shortness of breath with current level of exertion. States LE weakness limits abilities. He states BP at home 128-145 systolic, seems like BPs described to Dr Willoughby last visit were actually diastolics (\"in the 70s\"). He feels faintish sometimes if sitting too long but denies palpitations, presyncope, syncope, orthostasis. Weight at home today 382 lb. Patient reports having issue with pharmacy recently so has been out of metoprolol and synthroid for 9 days.     PAST MEDICAL HISTORY:  Past Medical History:   Diagnosis Date     Atrial fibrillation (H)      Basal cell carcinoma      Chronic anticoagulation      Diastolic heart failure (H)      Dyslipidemia      Essential hypertension      Morbid obesity (H)      Sleep-disordered breathing      Type 2 diabetes mellitus (H)        FAMILY HISTORY:  Family History   Problem Relation Age of Onset     Depression Mother      Glaucoma Maternal Grandfather      " Cancer No family hx of         No family history of skin cancer     Macular Degeneration No family hx of        SOCIAL HISTORY:  Social History     Socioeconomic History     Marital status: Single     Spouse name: Not on file     Number of children: Not on file     Years of education: Not on file     Highest education level: Not on file   Social Needs     Financial resource strain: Not on file     Food insecurity - worry: Not on file     Food insecurity - inability: Not on file     Transportation needs - medical: Not on file     Transportation needs - non-medical: Not on file   Occupational History     Not on file   Tobacco Use     Smoking status: Never Smoker     Smokeless tobacco: Never Used   Substance and Sexual Activity     Alcohol use: No     Comment: Former alcohol abuse. Quit 70s then quit later in 80s-present     Drug use: No     Comment: history of LSD use     Sexual activity: Not on file   Other Topics Concern     Parent/sibling w/ CABG, MI or angioplasty before 65F 55M? Not Asked   Social History Narrative    Lives in an apartment in 16th floor near hospital.  Has elevators, unable to use stairs. Not able to shop; has things delivered to him including food. Difficulty completing cleaning. Goes to PCP once yearly for annual check up and medication review.       CURRENT MEDICATIONS:    Current Outpatient Medications on File Prior to Visit:  ammonium lactate (LAC-HYDRIN) 12 % cream Apply topically 2 times daily as needed for dry skin   Ascorbic Acid (VITAMIN C PO)    aspirin 81 MG tablet Take 1 tablet (81 mg) by mouth daily   atorvastatin (LIPITOR) 40 MG tablet Take 1 tablet (40 mg) by mouth daily   blood glucose monitoring (ONE TOUCH DELICA) lancets Use to test blood sugars 2 times daily or as directed.   blood glucose monitoring (ONE TOUCH ULTRA MINI) meter device kit Use to test blood sugars 2 times daily or as directed.   blood glucose monitoring (ONETOUCH ULTRA) test strip Use to test blood sugars 2  times daily or as directed.   levothyroxine (SYNTHROID/LEVOTHROID) 175 MCG tablet Take 1 tablet (175 mcg) by mouth daily   lisinopril (PRINIVIL/ZESTRIL) 5 MG tablet Take 1 tablet (5 mg) by mouth daily Take in Pm   metFORMIN (GLUCOPHAGE) 500 MG tablet Take 1 tablet (500 mg) by mouth 2 times daily (with meals)   metoprolol succinate ER (TOPROL-XL) 100 MG 24 hr tablet Take 1 tablet (100 mg) by mouth daily   multivitamin, therapeutic with minerals (MULTI-VITAMIN) TABS tablet Take 1 tablet by mouth daily   nystatin (MYCOSTATIN) cream Apply twice daily to affected area   omega 3 1000 MG CAPS Take 1 g by mouth daily   order for DME Equipment being ordered: Bariatric Lift chairDiagnosis - morbid obesity, CHF, LymphedemaFax to Ne from [employer] at [phone].   order for DME Equipment being ordered: Other: Velcro Compression stockings. Treatment Diagnosis: bilateral lymphedema.   polyethylene glycol (MIRALAX/GLYCOLAX) Packet Take 17 g by mouth daily as needed for constipation   potassium chloride SA (K-DUR/KLOR-CON M) 20 MEQ CR tablet Take 1 tablet (20 mEq) by mouth 2 times daily   senna-docusate (SENOKOT-S/PERICOLACE) 8.6-50 MG tablet Take 1-2 tablets by mouth 2 times daily as needed for constipation   spironolactone (ALDACTONE) 25 MG tablet Take 1 tablet (25 mg) by mouth daily   tamsulosin (FLOMAX) 0.4 MG capsule Take 1 capsule (0.4 mg) by mouth daily   torsemide (DEMADEX) 100 MG tablet Take 1 tablet (100 mg) by mouth 2 times daily   vitamin B complex with vitamin C (VITAMIN  B COMPLEX) TABS tablet Take 1 tablet by mouth daily   VITAMIN E COMPLEX PO    warfarin (COUMADIN) 5 MG tablet Take 2.5 tablets (12.5 mg) by mouth daily   warfarin (COUMADIN) 10 MG tablet Take 1 tablet (10 mg) by mouth once for 1 dose   zolpidem (AMBIEN) 5 MG tablet Take tablet by mouth 15 minutes prior to sleep, for Sleep Study (Patient not taking: Reported on 10/30/2018)     No current facility-administered medications on file prior to visit.  "    ROS:   CONSTITUTIONAL: Denies fever, chills, fatigue. +Weight gain.  HEENT: Denies headache, vision changes, and changes in speech.   CV: Refer to HPI.   PULMONARY:Refer to HPI.   GI:Denies nausea, vomiting, diarrhea, and abdominal pain. Bowel movements are regular.   :Denies urinary alterations, dysuria, urinary frequency, hematuria, and abnormal drainage.   EXT:+chronic lower extremity edema.   SKIN:Denies abnormal rashes or lesions.   MUSCULOSKELETAL:Denies upper or lower extremity weakness and pain.   NEUROLOGIC:Denies lightheadedness, dizziness, seizures, or upper or lower extremity paresthesia.     EXAM:  /89 (BP Location: Left arm, Patient Position: Chair, Cuff Size: Adult Regular)   Pulse 124   Ht 1.803 m (5' 11\")   Wt (!) 175.1 kg (386 lb)   SpO2 96%   BMI 53.84 kg/m       GENERAL: Appears comfortable, in no acute distress.   HEENT: Eye symmetrical, no discharge or icterus bilaterally. Mucous membranes moist and without lesions.  CV: Tachycardic and irregular, distant S1S2, no appreciable murmur, rub, or gallop. JVP difficult to appreciate but elevated (examined in WC).   RESPIRATORY: Respirations regular, even, and unlabored. Lungs CTA throughout.   GI: Soft, obese, and non distended with normoactive bowel sounds present in all quadrants. No tenderness, rebound, guarding. No hepatomegaly.   EXTREMITIES: 1-2+bilat peripheral edema. 2+ bilateral pedal pulses.   NEUROLOGIC: Alert and oriented x 3. No focal deficits.   MUSCULOSKELETAL: No joint swelling or tenderness.   SKIN: No jaundice. No rashes.     Labs, reviewed with patient in clinic today:  CBC RESULTS:  Lab Results   Component Value Date    WBC 9.3 06/04/2018    RBC 4.37 (L) 06/04/2018    HGB 15.2 12/11/2018    HCT 52.1 12/11/2018    .0 (H) 12/11/2018    MCH 29.5 12/11/2018    MCHC 29.2 (L) 12/11/2018    RDW 13.7 06/04/2018     06/04/2018       CMP RESULTS:  Lab Results   Component Value Date    .3 12/11/2018    " POTASSIUM 4.3 12/11/2018    CHLORIDE 91.1 (L) 12/11/2018    CO2 31.2 12/11/2018    ANIONGAP 7 07/18/2018    .7 (H) 12/11/2018    BUN 24.1 (H) 12/11/2018    CR 1.8 (H) 12/11/2018    GFRESTIMATED 40.1 (L) 12/11/2018    GFRESTBLACK 48.5 (L) 12/11/2018    CHERI 10.4 (H) 12/11/2018    BILITOTAL 0.9 12/11/2018    ALBUMIN 3.3 (L) 06/04/2018    ALKPHOS 88.2 12/11/2018    ALT 19.8 12/11/2018    AST 20.7 12/11/2018        INR RESULTS:  Lab Results   Component Value Date    INR 2.29 (H) 12/11/2018       Lab Results   Component Value Date    MAG 2.3 06/08/2018     Lab Results   Component Value Date    NTBNPI 1,621 (H) 06/04/2018     Lab Results   Component Value Date    NTBNP 1,389 (H) 12/11/2018       Diagnostics:  ECG 1/6/18  Atrial fibrillation with PVCs, multifocal    TTE 10/30/18  Global and regional left ventricular function is normal with an EF of 55-60%.  No regional wall motion abnormalities are seen.  Mild to moderate right ventricular dilation is present. Global right  ventricular function is mildly to moderately reduced.  No significant valve abnormalities.  Dilation of the inferior vena cava is present with normal respiratory  variation in diameter. Trivial pericardial effusion is present with no  hemodynamic significance.     This study was compared with the study from 05/18/2018. RV apopears somewhat  more dilated but otherwise no significant change.    [facility] 6/6/18        Assessment and Plan:   Mr. Moreira is a 68 year old male with prior history of systolic heart failure, most recent LVEF normal, also hx of morbid obesity, atrial fibrillation, THONG/alveolar hypoventilation, HTN who presents to CORE clinic for follow-up. He is fluid up today and in afib with RVR in the setting of missed Toprol doses. Plan below discussed with Dr Willoughby.    # Chronic borderline diastolic heart failure/HFpEF with improved EF  Stage C. NYHA Class III.    Fluid status: hypervolemic, metolazone 5 mg once today and torsemide 50  mg bid, increase kcl  ACEi/ARB/ARNI: lisinopril 5 mg daily at HS   BB: resume Toprol 1/2 dose 50 mg daily for HTN and rate control, increase back to 100 mg next visit if euvolemic  Aldosterone antagonist: spironolactone 25 mg bid (despite normal EF now, some benefit shown in TOPCAT trial)  SCD prophylaxis: n/a EF>40%  Ischemic evaluation: negative coronary angiogram 6/2018  Remote monitoring: uses [employer]     # Afib with RVR  ECG today confirms. Relatively asymptomatic and hemodynamically stable. In the setting of missed BB dose. Will have him resume Toprol 50 mg daily (prior dose 100 mg but he has been without it for 9 days and decompensated). Close follow-up.     # HTN  -controlled on HF therapies    # Atrial fibrillation ?permanant, hx of ablation per patient   [id number]   -he is on warfarin and Toprol  -overdue to see EP, referral placed    # THONG/alveolar hypoventilation  -encouraged consistent CPAP use       Follow up in CORE in two weeks      25 minutes spent face-to-face with patient, >50% in counseling and/or coordination of care as described above        Alisha Norman DNP, NP-C  12/12/2018              CC  GEOFFREY PARSONS

## 2018-12-13 LAB
INTERPRETATION ECG - MUSE: NORMAL
T4 FREE SERPL-MCNC: 1.21 NG/DL (ref 0.76–1.46)
TSH SERPL DL<=0.005 MIU/L-ACNC: 11.2 MU/L (ref 0.4–4)

## 2018-12-13 NOTE — PROGRESS NOTES
Behavioral Health Progress Note    Client Legal Name: Clarke Moreira   Client Preferred Name: Clarke   Service Type: Individual  Length of Visit: 50 minutes  Attendees: patient     Identifying Information and Presenting Problem:    The patient is a 68 year old American male who is being seen for problematic symptoms of depression, ongoing adjustment to medical illness, as well as social isolation.  Some difficulties attending appointments as he feels better after attending them and hopeful and then old thinking starts to set back in and it starts to feel that it can be too much to come to the appointments and then cope with the let down later.     Treatment Objective(s) Addressed in This Session:    reports one goal is maintaining healthy eating patterns, feels as if he is returning to old patterns before he went into the hospital and believes this is indicative of his mental disease, doesn't want to do physical activity because then he has to leave his home.    Progress on / Status of Treatment Objective(s) / Homework:  Clarke shares some progress in terms of how he has handled some situations in a positive manner.    PHQ-9 SCORE 8/28/2018 10/22/2018 12/11/2018   PHQ-9 Total Score - - -   PHQ-9 Total Score 24 21 25       CHRIS-7 SCORE 8/28/2018 10/22/2018 12/11/2018   Total Score - - -   Total Score 18 13 20     Topics Discussed/Interventions Provided:  Clrake shared some of the experiences he had this morning and his response to these experiences.  Provided praise and reinforcement for his ability to emotionally regulate during these circumstances and respond appropriately. Explored impact on his mood when he is able to respond in a deliberate manner, rather than talking irritably with the people involved.  Continued to build trust through responding empathically to Clarke's emotional experience of being in therapy.  Wants to come to therapy, but continues to have some ambivalence about opening up and  sharing during the session and then feeling so badly when he is back at home by himself again.  Discussed the opportunities he has opened for himself to connect with others when he is takes a risk and allows himself to view a situation in a more positive manner.       Assessment: The patient appeared to be active and engaged in today's session and was receptive to feedback.    Mental Status: Clarke appeared generally alert and oriented. He was dressed in shorts, a shirt, and his biker vest.  He was well-groomed.  Pleasant and engaged in the conversation.  Eye contact was good, appropriate. Speech was of normal volume and rate and was clear, coherent, and relevant.  Mood was described as down.  Tearful periodically.  Affect was congruent with stated mood and thoughts.  Thought processes were circumstantial at times.  Thought content was WNL with no evidence of psychotic or paranoid features. No evidence of SI/HI or self-harm, intent, or plans. Memory appeared grossly intact. Insight and judgment appeared good and patient exhibited good impulse control during the appointment.     Does the patient appear to be at imminent risk of harm to self/others at this time? No    The session was necessary to address ongoing feelings of disconnection and isolation from others, as well as deep seated sense of being unworthy. These symptoms of depression have been interfering with patient's ability to function at home and socially.  Ongoing psychotherapy is necessary to provide counseling and provide support.    Diagnosis (DSM-5):  Major Depression, recurrent, moderate  Generalized Anxiety Disorder  R/o persistent depressive disorder    Plan:  1. Follow up in 2 weeks.  2. Do tx plan at next visit.    NOTE: Treatment plan needed.  Diagnostic assessment update due 10/15/19.

## 2018-12-14 DIAGNOSIS — K59.00 CONSTIPATION, UNSPECIFIED CONSTIPATION TYPE: ICD-10-CM

## 2018-12-14 NOTE — TELEPHONE ENCOUNTER

## 2018-12-16 DIAGNOSIS — I50.22 CHRONIC SYSTOLIC CONGESTIVE HEART FAILURE (H): Primary | ICD-10-CM

## 2018-12-16 RX ORDER — AMOXICILLIN 250 MG
1-2 CAPSULE ORAL 2 TIMES DAILY PRN
Qty: 60 TABLET | Refills: 11 | Status: ON HOLD | OUTPATIENT
Start: 2018-12-16 | End: 2019-03-16

## 2018-12-17 PROBLEM — I73.9 PAD (PERIPHERAL ARTERY DISEASE) (H): Status: ACTIVE | Noted: 2018-12-17

## 2018-12-19 ENCOUNTER — TELEPHONE (OUTPATIENT)
Dept: CARDIOLOGY | Facility: CLINIC | Age: 68
End: 2018-12-19

## 2018-12-19 ENCOUNTER — TELEPHONE (OUTPATIENT)
Dept: FAMILY MEDICINE | Facility: CLINIC | Age: 68
End: 2018-12-19

## 2018-12-19 DIAGNOSIS — I50.22 CHRONIC SYSTOLIC CONGESTIVE HEART FAILURE (H): Primary | ICD-10-CM

## 2018-12-19 NOTE — PROGRESS NOTES
Date: 12/19/2018    Time of Call: 9:53 AM     Diagnosis:  Heart failure     [ VORB ] Ordering provider: Alisha Norman NP    Order: BMP     Order received by: Andra Tobias RN      Follow-up/additional notes: BMP order placed. Patient aware. Will have drawn at [facility] today. Will await results for further changes.

## 2018-12-19 NOTE — TELEPHONE ENCOUNTER
RN spoke with one of the staff from the lab, and since the order is in the computer he is okay to schedule his lab only visit. RN contacted patient and transferred him to set up his appointment.  Kyra Jackson RN

## 2018-12-19 NOTE — TELEPHONE ENCOUNTER
"[facility] phone call message - order or referral request from patient:     Order or referral being requested: lab order for [facility] from Sybil Norman NP. [facility]    Additional Details: per patient, \"Sybil Norman NP has written a lab order for patient to have labs done here at [facility]\". I looked in patient chart, I do see an order for BMP written by Azeb Smith NP. Forwarded message to triage.     Location lab at [facility]    OK to leave a message on voice mail? Yes    Primary language: English      needed? No    Call taken on December 19, 2018 at 9:00 AM by Shaila Pettit    Order request route to [facility] TRIAGE   Referrals Route to [facility] (Green/[place]/Purple) CARE COORDINATOR      "

## 2018-12-19 NOTE — TELEPHONE ENCOUNTER
Clarke called in today requesting an order for lab draw he was recommended to get this week. Confirmed lab draw with Alisha Norman NP. Order placed in separate encounter.  Discussed with Clarke how he is feeling. He reports he is feeling 'fine.' He reports he has not weighed himself since Friday but as of Friday he was down 3lb from his visit. He denies shortness of breath, lower extremity edema or bloating. He reports his main issue is that he had a mix up with the pharmacy and is now trying to get caught up and back on his medications.   Will await lab results and follow up at that time.

## 2018-12-21 ENCOUNTER — TELEPHONE (OUTPATIENT)
Dept: CARDIOLOGY | Facility: CLINIC | Age: 68
End: 2018-12-21

## 2018-12-21 ENCOUNTER — TELEPHONE (OUTPATIENT)
Dept: FAMILY MEDICINE | Facility: CLINIC | Age: 68
End: 2018-12-21

## 2018-12-21 NOTE — TELEPHONE ENCOUNTER
Pt's [employer] weekly weights and HR  .     Called pt, reached VM, LM requesting return call. Sent to provider for review. Laura Phan RN CORE Care Coordinator

## 2018-12-21 NOTE — TELEPHONE ENCOUNTER
[facility] phone call message - order or referral request from patient:     Order or referral being requested: Requested order for skilled nursing re cert for 1x a week for 9 weeks and 3 PRN  Additional Details:please call home care nurse    OK to leave a message on voice mail? Yes    Primary language: English      needed? No    Call taken on December 21, 2018 at 1:22 PM by Jhoana Morgan    Order request route to [facility] TRIAGE   Referrals Route to [facility] (Green/[place]/Purple) CARE COORDINATOR

## 2018-12-22 ENCOUNTER — MEDICAL CORRESPONDENCE (OUTPATIENT)
Dept: HEALTH INFORMATION MANAGEMENT | Facility: CLINIC | Age: 68
End: 2018-12-22

## 2018-12-26 DIAGNOSIS — I50.22 CHRONIC SYSTOLIC CONGESTIVE HEART FAILURE (H): Primary | ICD-10-CM

## 2018-12-27 ENCOUNTER — CARE COORDINATION (OUTPATIENT)
Dept: CARDIOLOGY | Facility: CLINIC | Age: 68
End: 2018-12-27

## 2018-12-27 DIAGNOSIS — I48.91 ATRIAL FIBRILLATION, UNSPECIFIED TYPE (H): Primary | ICD-10-CM

## 2018-12-28 ENCOUNTER — OFFICE VISIT (OUTPATIENT)
Dept: PSYCHOLOGY | Facility: CLINIC | Age: 68
End: 2018-12-28
Payer: COMMERCIAL

## 2018-12-28 DIAGNOSIS — Z01.89 LABORATORY PROCEDURE: Primary | ICD-10-CM

## 2018-12-28 DIAGNOSIS — F33.1 MODERATE EPISODE OF RECURRENT MAJOR DEPRESSIVE DISORDER (H): Primary | ICD-10-CM

## 2018-12-28 DIAGNOSIS — F41.1 GAD (GENERALIZED ANXIETY DISORDER): ICD-10-CM

## 2018-12-28 DIAGNOSIS — I50.22 CHRONIC SYSTOLIC CONGESTIVE HEART FAILURE (H): ICD-10-CM

## 2018-12-28 LAB
ANION GAP SERPL CALCULATED.3IONS-SCNC: 7 MMOL/L (ref 3–14)
BUN SERPL-MCNC: 54 MG/DL (ref 7–30)
CALCIUM SERPL-MCNC: 9.2 MG/DL (ref 8.5–10.1)
CHLORIDE SERPL-SCNC: 100 MMOL/L (ref 94–109)
CO2 SERPL-SCNC: 32 MMOL/L (ref 20–32)
CREAT SERPL-MCNC: 1.82 MG/DL (ref 0.66–1.25)
GFR SERPL CREATININE-BSD FRML MDRD: 37 ML/MIN/{1.73_M2}
GLUCOSE SERPL-MCNC: 109 MG/DL (ref 70–99)
POTASSIUM SERPL-SCNC: 4.8 MMOL/L (ref 3.4–5.3)
SODIUM SERPL-SCNC: 139 MMOL/L (ref 133–144)

## 2018-12-28 PROCEDURE — 80048 BASIC METABOLIC PNL TOTAL CA: CPT | Performed by: NURSE PRACTITIONER

## 2018-12-28 NOTE — PROGRESS NOTES
Behavioral Health Progress Note    Client Legal Name: Clarke Moreira   Client Preferred Name: Clarke   Service Type: Individual  Length of Visit: 45 minutes  Attendees: patient     Identifying Information and Presenting Problem:    The patient is a 68 year old American male who is being seen for problematic symptoms of depression, ongoing adjustment to medical illness, as well as social isolation.  Recently cancelled an appointment, but returned today even though he was debating cancelling this appointment as well.   Finds it difficult to cope with the transition from improved mood to decreased mood following an appointment.    Treatment Objective(s) Addressed in This Session:    reports one goal is maintaining healthy eating patterns, feels as if he is returning to old patterns before he went into the hospital and believes this is indicative of his mental disease, doesn't want to do physical activity because then he has to leave his home.    Progress on / Status of Treatment Objective(s) / Homework:  Describes a couple of situations where he was feeling more hopeful.  Reports he felt enough hope after the last session to attend to other medical appointments he had that week.     PHQ-9 SCORE 8/28/2018 10/22/2018 12/11/2018   PHQ-9 Total Score - - -   PHQ-9 Total Score 24 21 25       CHRIS-7 SCORE 8/28/2018 10/22/2018 12/11/2018   Total Score - - -   Total Score 18 13 20     Topics Discussed/Interventions Provided:  Has been having more health struggles lately.  Reports feeling very frightened by his health and physically fragile.  Vision has been more blurry.  Relates the bluriness to eating food, so wasn't eating as often.  Noticed he felt weaker since he was not eating.  When he does do some physical activity, he feels as if his vision clears around the edges.  Has been in contact with his physicians who are working with him on these symptoms.  Explored emotions related to the physical symptoms that hae been  occurring.      There have been a couple of situations that have occurred in the past week where he has found himself feeling very grateful and positive.  Discussed the importance of self-compassion as a way to motivate himself to move forward rather than condemnation.  Explored relationship patterns that make it difficult for him to feel connected to other people.  Provided supportive reflection.     Assessment: The patient appeared to be active and engaged in today's session and was receptive to feedback.    Mental Status: Clarke appeared generally alert and oriented. He was dressed in shorts, a shirt, and his biker vest.  Feels disgusted with himself that he has to wear shorts in this weather because he can not get pants over his swollen leg.  He was well-groomed.  Pleasant and engaged in the conversation.  Eye contact was good, appropriate. Speech was of normal volume and rate and was clear, coherent, and relevant.  Mood was described as sad, but more hopeful.  Affect was congruent with stated mood and thoughts.  Thought processes were circumstantial at times.  Thought content was WNL with no evidence of psychotic or paranoid features. No evidence of SI/HI or self-harm, intent, or plans. Memory appeared grossly intact. Insight and judgment appeared good and patient exhibited good impulse control during the appointment.     Does the patient appear to be at imminent risk of harm to self/others at this time? No    The session was necessary to address feelings of fragility and vulnerability related to reported increased health symptoms.  Encouraged him to look for ways to practice self-compassion rather than berating himself. These symptoms of depression have been interfering with patient's ability to function at home and socially.  Ongoing psychotherapy is necessary to provide counseling and provide support.    Diagnosis (DSM-5):  Major Depression, recurrent, moderate  Generalized Anxiety Disorder  R/o persistent  depressive disorder    Plan:  1. Follow up in 2 weeks.  2. Do tx plan at next visit.    NOTE: Treatment plan needed.  Diagnostic assessment update due 10/15/19.

## 2019-01-02 ENCOUNTER — TELEPHONE (OUTPATIENT)
Dept: CARDIOLOGY | Facility: CLINIC | Age: 69
End: 2019-01-02

## 2019-01-02 NOTE — TELEPHONE ENCOUNTER
Received a call from [employer] regarding this pt with an abnormal ziopatch result.    Report: Rapid AF with a max beat of 213 bpm. Sustained at 187 bpm for 1 min and we could see that on strip 2.    Strips were shown to Marta LICONA RN as Laura is at the hospital. Both nurses are informed and aware.    Cat Case CMA

## 2019-01-02 NOTE — TELEPHONE ENCOUNTER
----- Message from REAGAN Okeefe CNP sent at 12/28/2018  3:07 PM CST -----  Can we please call patient and bump up his 1/9 appt wth me to with anyone in CORE next week? I called patient today and let him know id like to do this so he is aware.

## 2019-01-04 ENCOUNTER — TELEPHONE (OUTPATIENT)
Dept: SLEEP MEDICINE | Facility: CLINIC | Age: 69
End: 2019-01-04

## 2019-01-04 NOTE — TELEPHONE ENCOUNTER
Called patient to schedule overnight sleep study. Patient states it was not the best time to schedule that appointment and he will return my call Monday Jan 7, 2019 to schedule the appointment.    Landen Dill  Sleep Clinic Specialist - [place]

## 2019-01-07 NOTE — TELEPHONE ENCOUNTER
Spoke with Clrake who stated at this time he is unable to schedule the overnight sleep study. [facility]th, 2019. Where he believe at this appointment he will be admitted. He states he will call in the next few weeks to schedule when he believes is the best time.    Landen Dill  Sleep Clinic Specialist - [place]

## 2019-01-08 DIAGNOSIS — K59.00 CONSTIPATION, UNSPECIFIED CONSTIPATION TYPE: ICD-10-CM

## 2019-01-08 NOTE — TELEPHONE ENCOUNTER

## 2019-01-09 DIAGNOSIS — I50.22 CHRONIC SYSTOLIC CONGESTIVE HEART FAILURE (H): Primary | ICD-10-CM

## 2019-01-09 RX ORDER — POLYETHYLENE GLYCOL 3350 17 G/17G
17 POWDER, FOR SOLUTION ORAL DAILY PRN
Qty: 15 PACKET | Refills: 11 | Status: ON HOLD | OUTPATIENT
Start: 2019-01-09 | End: 2019-03-16

## 2019-01-14 ENCOUNTER — OFFICE VISIT (OUTPATIENT)
Dept: PSYCHOLOGY | Facility: CLINIC | Age: 69
End: 2019-01-14
Payer: COMMERCIAL

## 2019-01-14 DIAGNOSIS — F41.1 GAD (GENERALIZED ANXIETY DISORDER): ICD-10-CM

## 2019-01-14 DIAGNOSIS — F33.1 MODERATE EPISODE OF RECURRENT MAJOR DEPRESSIVE DISORDER (H): Primary | ICD-10-CM

## 2019-01-14 NOTE — PROGRESS NOTES
Chief Complaint   Patient presents with     RECHECK     9 week follow up. Diabetic foot care. New symptoms on left hallux.           No Known Allergies      Subjective: Clarke is a 68 year old male who presents to the clinic today for a diabetic foot exam and management. He relates that he has not had any other ulcerations open on either foot. He does have diabetic shoes and he has been wearing those. Relates vitaliy in the left great toe and thinks that the nail is ingrown.     Objective  DP and PT pulses cannot be palpated.  Diminished pedal hair.    Protective sensation diminished.  He does relate some numbness in the bottom of both feet.  Nails are thickened, elongated, yellow, brittle, with subungual debris debris consistent with onychomycosis ×10. Left lateral nail is ingrown with no s/s of infection, but does have sanguinous drainage.   There is some thickening to the right distal Achilles, with pain noted with palpation of this area.         Assessment: Type 2 diabetes with neuropathy.    Onychomycosis ×10. Onychocryptosis to the left hallux. This was sharply removed and covered with bacitracin and a bandaid.    Lymphedema        Plan:  - Pt seen and evaluated.  - Nails were debrided x 10.   - See again in 2 months or sooner if problems arise.

## 2019-01-14 NOTE — PROGRESS NOTES
"Behavioral Health Progress Note    Client Legal Name: Clarke Moreira   Client Preferred Name: Clarke   Service Type: Individual  Length of Visit: 45 minutes  Attendees: patient     Identifying Information and Presenting Problem:    The patient is a 68 year old American male who is being seen for problematic symptoms of depression, ongoing adjustment to medical illness, as well as social isolation.  Recently cancelled an appointment, but returned today even though he was debating cancelling this appointment as well.   Finds it difficult to cope with the transition from improved mood to decreased mood following an appointment.    Treatment Objective(s) Addressed in This Session:    reports one goal is maintaining healthy eating patterns, feels as if he is returning to old patterns before he went into the hospital and believes this is indicative of his mental disease, doesn't want to do physical activity because then he has to leave his home.    Progress on / Status of Treatment Objective(s) / Homework:  Describes a couple of situations where he was feeling more hopeful.  Reports he felt enough hope after the last session to attend to other medical appointments he had that week.     PHQ-9 SCORE 8/28/2018 10/22/2018 12/11/2018   PHQ-9 Total Score - - -   PHQ-9 Total Score 24 21 25       CHRIS-7 SCORE 8/28/2018 10/22/2018 12/11/2018   Total Score - - -   Total Score 18 13 20     Topics Discussed/Interventions Provided:  Clarke followed up on what he describes as his \"suicidal tendencies\" that we discussed at last visit.  He is not having acutely suicidal thoughts or plan, but describes his whole approach to his life as a form of slow suicide.  He states that he has been slowly killing himself since he stopped seeing his last therapist.  He doesn't take care of himself or his health, doesn't leave the house, doesn't eat like he is supposed to . Feels quite discouraged about his approach to caring for himself.  Is able " "to pinpoint some more positive behaviors, but also has little german that these are \"real.\"  Clarke shared a couple of interactions he has had in the past few weeks.  Thoughts that occurred after these encounters include \"I can't upset other people\" \"I can't do anything.\" \"people would not want to be friends with me\"  Session was focused on identifying these thoughts and gently challenging these thoughts/perceptions.  Clarke finds that when he tries to really engage with someone, he feels more positively and has less of these thoughts, but when they leave, he is overtaken by despair and negative feelings about being so vulnerable and then he eats. Eating initially helps him to feel better, but then it leaves him feeling worse. He can identify this cycle, but is not sure that this cycle can be changed.     Assessment: The patient appeared to be active and engaged in today's session and was receptive to feedback.    Mental Status: Clarke appeared generally alert and oriented. He was dressed in shorts, a shirt, and his biker vest.  He is in short because he can not get pants over his swollen leg.   He was well-groomed.  Clarke is very engaged, pleasant, and engaged throughout the session.  Eye contact was good, took his sunglasses off for the session. Speech was of normal volume and rate and was clear, coherent, and relevant.  Mood was described as sad.  Affect was congruent with stated mood and thoughts.  Thought processes were circumstantial at times, but he did bring the whole conversation back to the initial point where it started.  Thought content was WNL with no evidence of psychotic or paranoid features. No evidence of SI/HI or self-harm, intent, or plans. Memory appeared grossly intact. Insight and judgment appeared good and patient exhibited good impulse control during the appointment.     Does the patient appear to be at imminent risk of harm to self/others at this time? No    The session was necessary to " identify and challenge unhelpful thinking that occurs regarding his perception of himself, as well as how he thinks others view him.  Symptoms of depression have continued to interfere with his ability to function at home and socially.  Ongoing psychotherapy is necessary to provide counseling and provide support.    Diagnosis (DSM-5):  Major Depression, recurrent, moderate  Generalized Anxiety Disorder  R/o persistent depressive disorder    Plan:  1. Follow up in 2 weeks.  2. Do tx plan at next visit.    NOTE: Treatment plan needed.  Diagnostic assessment update due 10/15/19.

## 2019-01-15 ENCOUNTER — OFFICE VISIT (OUTPATIENT)
Dept: ORTHOPEDICS | Facility: CLINIC | Age: 69
End: 2019-01-15
Payer: COMMERCIAL

## 2019-01-15 DIAGNOSIS — E11.9 TYPE 2 DIABETES MELLITUS WITHOUT COMPLICATION, WITHOUT LONG-TERM CURRENT USE OF INSULIN (H): ICD-10-CM

## 2019-01-15 DIAGNOSIS — B35.1 ONYCHOMYCOSIS: Primary | ICD-10-CM

## 2019-01-15 DIAGNOSIS — I48.19 PERSISTENT ATRIAL FIBRILLATION (H): ICD-10-CM

## 2019-01-15 DIAGNOSIS — E11.65 TYPE 2 DIABETES MELLITUS WITH HYPERGLYCEMIA, WITHOUT LONG-TERM CURRENT USE OF INSULIN (H): ICD-10-CM

## 2019-01-15 DIAGNOSIS — I73.9 PAD (PERIPHERAL ARTERY DISEASE) (H): ICD-10-CM

## 2019-01-15 RX ORDER — WARFARIN SODIUM 5 MG/1
12.5 TABLET ORAL DAILY
Qty: 75 TABLET | Refills: 11 | Status: ON HOLD | OUTPATIENT
Start: 2019-01-15 | End: 2019-03-16

## 2019-01-15 RX ORDER — ATORVASTATIN CALCIUM 40 MG/1
40 TABLET, FILM COATED ORAL DAILY
Qty: 30 TABLET | Refills: 11 | Status: ON HOLD | OUTPATIENT
Start: 2019-01-15 | End: 2019-03-16

## 2019-01-15 NOTE — TELEPHONE ENCOUNTER

## 2019-01-15 NOTE — NURSING NOTE
Reason For Visit:   Chief Complaint   Patient presents with     RECHECK     9 week follow up. Diabetic foot care. New symptoms on left hallux.        Pain Assessment  Patient Currently in Pain: Yes  Primary Pain Location: (Left hallux)  Pain Descriptors: Sharp               Current Outpatient Medications   Medication Sig Dispense Refill     ammonium lactate (LAC-HYDRIN) 12 % cream Apply topically 2 times daily as needed for dry skin 385 g 1     Ascorbic Acid (VITAMIN C PO)        aspirin 81 MG tablet Take 1 tablet (81 mg) by mouth daily 30 tablet 0     atorvastatin (LIPITOR) 40 MG tablet Take 1 tablet (40 mg) by mouth daily 30 tablet 11     blood glucose monitoring (ONE TOUCH DELICA) lancets Use to test blood sugars 2 times daily or as directed. 100 each 11     blood glucose monitoring (ONE TOUCH ULTRA MINI) meter device kit Use to test blood sugars 2 times daily or as directed. 1 kit 0     blood glucose monitoring (ONETOUCH ULTRA) test strip Use to test blood sugars 2 times daily or as directed. 100 strip 11     levothyroxine (SYNTHROID/LEVOTHROID) 175 MCG tablet Take 1 tablet (175 mcg) by mouth daily 30 tablet 11     lisinopril (PRINIVIL/ZESTRIL) 5 MG tablet Take 1 tablet (5 mg) by mouth daily Take in Pm 60 tablet 3     metFORMIN (GLUCOPHAGE) 500 MG tablet Take 1 tablet (500 mg) by mouth 2 times daily (with meals) 60 tablet 11     metolazone (ZAROXOLYN) 5 MG tablet Take 1 tablet (5 mg) by mouth once for 1 dose Only as directed by [facility] clinic 10 tablet 0     metoprolol succinate ER (TOPROL-XL) 100 MG 24 hr tablet Take 0.5 tablets (50 mg) by mouth daily 30 tablet 11     multivitamin, therapeutic with minerals (MULTI-VITAMIN) TABS tablet Take 1 tablet by mouth daily 30 each 11     nystatin (MYCOSTATIN) cream Apply twice daily to affected area 30 g 3     omega 3 1000 MG CAPS Take 1 g by mouth daily 30 capsule 11     order for DME Equipment being ordered: Bariatric Lift chair  Diagnosis - morbid obesity, CHF,  Lymphedema    Fax to Ne from [employer] at [phone]. 1 Units 0     order for DME Equipment being ordered: Other: Velcro Compression stockings.   Treatment Diagnosis: bilateral lymphedema. 2 each 0     polyethylene glycol (MIRALAX/GLYCOLAX) packet Take 17 g by mouth daily as needed for constipation 15 packet 11     potassium chloride (KLOR-CON) 20 MEQ packet Take 40 mEq by mouth 2 times daily 360 packet 3     senna-docusate (SENOKOT-S/PERICOLACE) 8.6-50 MG tablet Take 1-2 tablets by mouth 2 times daily as needed for constipation 60 tablet 11     spironolactone (ALDACTONE) 25 MG tablet Take 1 tablet (25 mg) by mouth daily 30 tablet 3     tamsulosin (FLOMAX) 0.4 MG capsule Take 1 capsule (0.4 mg) by mouth daily 30 capsule 11     torsemide (DEMADEX) 100 MG tablet Take 1.5 tablets (150 mg) by mouth 2 times daily 90 tablet 3     vitamin B complex with vitamin C (VITAMIN  B COMPLEX) TABS tablet Take 1 tablet by mouth daily       VITAMIN E COMPLEX PO        warfarin (COUMADIN) 10 MG tablet Take 1 tablet (10 mg) by mouth once for 1 dose 1 tablet 0     warfarin (COUMADIN) 5 MG tablet Take 2.5 tablets (12.5 mg) by mouth daily 75 tablet 11     zolpidem (AMBIEN) 5 MG tablet Take tablet by mouth 15 minutes prior to sleep, for Sleep Study (Patient not taking: Reported on 10/30/2018) 1 tablet 0        No Known Allergies

## 2019-01-15 NOTE — TELEPHONE ENCOUNTER
Called and left vm for patient to please return call to schedule sleep study.    Landen Dill  Sleep Clinic Specialist - [place]

## 2019-01-15 NOTE — LETTER
1/15/2019       RE: Clarke Moreira  [address]  [place] [id number]     Dear Colleague,    Thank you for referring your patient, Clarke Moreira, to the [facility]. Please see a copy of my visit note below.    Chief Complaint   Patient presents with     RECHECK     9 week follow up. Diabetic foot care. New symptoms on left hallux.       No Known Allergies    Subjective: Clarke is a 68 year old male who presents to the clinic today for a diabetic foot exam and management. He relates that he has not had any other ulcerations open on either foot. He does have diabetic shoes and he has been wearing those. Relates vitaliy in the left great toe and thinks that the nail is ingrown.     Objective  DP and PT pulses cannot be palpated.  Diminished pedal hair.    Protective sensation diminished.  He does relate some numbness in the bottom of both feet.  Nails are thickened, elongated, yellow, brittle, with subungual debris debris consistent with onychomycosis ×10.  Left lateral nail is ingrown with no s/s of infection, but does have sanguinous drainage.   There is some thickening to the right distal Achilles, with pain noted with palpation of this area.         Assessment: Type 2 diabetes with neuropathy.    Onychomycosis ×10.  Onychocryptosis to the left hallux. This was sharply removed and covered with bacitracin and a bandaid.    Lymphedema    Plan:  - Pt seen and evaluated.  - Nails were debrided x 10.   - See again in 2 months or sooner if problems arise.     Again, thank you for allowing me to participate in the care of your patient.      Sincerely,    Jesús Lagos DPM

## 2019-01-16 ENCOUNTER — DOCUMENTATION ONLY (OUTPATIENT)
Dept: SLEEP MEDICINE | Facility: CLINIC | Age: 69
End: 2019-01-16

## 2019-01-16 NOTE — PROGRESS NOTES
Patient walked in to clinic to update us that he is working on ancillary chf issues and will contact us when available to schedule sleep study.

## 2019-01-17 DIAGNOSIS — I50.22 CHRONIC SYSTOLIC CONGESTIVE HEART FAILURE (H): Primary | ICD-10-CM

## 2019-02-01 ENCOUNTER — TELEPHONE (OUTPATIENT)
Dept: FAMILY MEDICINE | Facility: CLINIC | Age: 69
End: 2019-02-01

## 2019-02-01 NOTE — TELEPHONE ENCOUNTER
Prior Authorization Retail Medication Request    Medication/Dose: cover my meds [id number]  ICD code (if different than what is on RX):    Previously Tried and Failed:    Rationale:      Insurance Name:  [employer]  Insurance ID:        Pharmacy Information (if different than what is on RX)  Name:  [facility]   Phone:  [phone]

## 2019-02-04 ENCOUNTER — MEDICAL CORRESPONDENCE (OUTPATIENT)
Dept: HEALTH INFORMATION MANAGEMENT | Facility: CLINIC | Age: 69
End: 2019-02-04

## 2019-02-07 DIAGNOSIS — I50.22 CHRONIC SYSTOLIC CONGESTIVE HEART FAILURE (H): Primary | ICD-10-CM

## 2019-02-13 ENCOUNTER — TELEPHONE (OUTPATIENT)
Dept: FAMILY MEDICINE | Facility: CLINIC | Age: 69
End: 2019-02-13

## 2019-02-13 DIAGNOSIS — K59.00 CONSTIPATION, UNSPECIFIED CONSTIPATION TYPE: ICD-10-CM

## 2019-02-13 RX ORDER — POLYETHYLENE GLYCOL 3350 17 G/17G
1 POWDER, FOR SOLUTION ORAL DAILY
Qty: 1530 G | Refills: 3 | Status: SHIPPED | OUTPATIENT
Start: 2019-02-13 | End: 2019-02-13

## 2019-02-13 NOTE — PROGRESS NOTES
Pharmacy informs us that OTCs are not covered by this pt's insurance.    Tj Palumbo PharmBradfordD.

## 2019-02-13 NOTE — TELEPHONE ENCOUNTER
Prior Authorization Retail Medication Request    Medication/Dose: polyethylene glycol (MIRALAX/GLYCOLAX) packet  ICD code (if different than what is on RX):  Constipation, unspecified constipation type [K59.00]   Previously Tried and Failed:  See chart  Rationale:  See chart    Insurance Name:  LYNETTE  Insurance ID:  [id number]      Pharmacy Information (if different than what is on RX)  Name:  [facility] [place]  Phone:  [phone]    Cover My Meds Deleon: [id number]    CHUY Velasco 2:27 PM February 13, 2019

## 2019-02-16 NOTE — TELEPHONE ENCOUNTER
"Prior Authorization Not Needed per Insurance    Medication: polyethylene glycol (MIRALAX/GLYCOLAX) packet-PA Not Needed  Insurance Company: [employer] - Phone [phone] Fax [phone]  Expected CoPay:      Pharmacy Filling the Rx: [id number] - [place], [place] - [phone]  Pharmacy Notified:  Yes  Patient Notified:  No    Called pharmacy, technician reprocessed medication through insurance but still got a rejection \" NDC not covered\" because it's available over the counter. Pharmacy will contact patient to notify him of this.          "

## 2019-02-18 NOTE — TELEPHONE ENCOUNTER
Called and left vm for patient to please return call to scheduled sleep study.    Landen Dill  Sleep Clinic Specialist - [place]

## 2019-02-19 ENCOUNTER — OFFICE VISIT (OUTPATIENT)
Dept: FAMILY MEDICINE | Facility: CLINIC | Age: 69
End: 2019-02-19
Payer: COMMERCIAL

## 2019-02-19 VITALS
OXYGEN SATURATION: 99 % | WEIGHT: 315 LBS | DIASTOLIC BLOOD PRESSURE: 68 MMHG | HEART RATE: 105 BPM | RESPIRATION RATE: 20 BRPM | SYSTOLIC BLOOD PRESSURE: 109 MMHG | BODY MASS INDEX: 53.92 KG/M2

## 2019-02-19 DIAGNOSIS — I50.30 (HFPEF) HEART FAILURE WITH PRESERVED EJECTION FRACTION (H): Primary | ICD-10-CM

## 2019-02-19 DIAGNOSIS — F41.8 DEPRESSION WITH ANXIETY: ICD-10-CM

## 2019-02-19 DIAGNOSIS — G47.33 OSA (OBSTRUCTIVE SLEEP APNEA): ICD-10-CM

## 2019-02-19 DIAGNOSIS — I89.0 LYMPHEDEMA: ICD-10-CM

## 2019-02-19 DIAGNOSIS — I48.91 ATRIAL FIBRILLATION, UNSPECIFIED TYPE (H): ICD-10-CM

## 2019-02-19 DIAGNOSIS — R55 PRE-SYNCOPE: ICD-10-CM

## 2019-02-19 LAB
BUN SERPL-MCNC: 27.1 MG/DL (ref 7–21)
CALCIUM SERPL-MCNC: 9.1 MG/DL (ref 8.5–10.1)
CHLORIDE SERPLBLD-SCNC: 98.3 MMOL/L (ref 98–110)
CO2 SERPL-SCNC: 23.4 MMOL/L (ref 20–32)
CREAT SERPL-MCNC: 1.3 MG/DL (ref 0.7–1.3)
GFR SERPL CREATININE-BSD FRML MDRD: 58.3 ML/MIN/1.7 M2
GLUCOSE SERPL-MCNC: 126.7 MG'DL (ref 70–99)
NT-PROBNP SERPL-MCNC: 1462 PG/ML (ref 0–125)
POTASSIUM SERPL-SCNC: 3.8 MMOL/DL (ref 3.3–4.5)
SODIUM SERPL-SCNC: 134.3 MMOL/L (ref 132.6–141.4)

## 2019-02-19 NOTE — PROGRESS NOTES
HPI  68 year old male here for evaluation of several issues.  Troubles with rides today again.      1. Equipment Face-to-Face meeting  Needs Face-to-Face meeting for CPAP equipment and Oxygen     A. CPAP   Finding it very useful.   Knows how to use  Has used faithfully except recently with bad URI  Needs new tubing / mask / etc.    B. Oxygen  Finds very useful  Knows how to use and set-up  Using faithfully.      2. Torsamide  In next price tier with [employer]  Wonders about other options  Will message [facility]    3. Meds      4. URI / Flu  Feb 5 started  Unable to use CPAP  Improving now.  No evidence of pneumonia on exam      5. CHF  Wt stable  Breathing a problem recently with URI  Appt in [facility] in AM    Wt Readings from Last 5 Encounters:   02/19/19 (!) 175.4 kg (386 lb 9.6 oz)   12/12/18 (!) 175.1 kg (386 lb)   12/11/18 (!) 176.3 kg (388 lb 9.6 oz)   10/30/18 (!) 175.7 kg (387 lb 6.4 oz)   10/22/18 (!) 175.5 kg (387 lb)       6. HTN  Well controlled    7. Postural symptoms  No symptoms recently d/t being in bed with illness  Previous to that, was having more Severe symptoms  ZioPatch with A-fib (sometimes with RVR). No VT.  May need to decrease meds  Will message OPAL Norman in [facility]      8. A-fib / anticoagulation  ZioPatch with 100% A-fib  No VT      9. Diabetic ulcer  Going to podiatry for new shoes      10 Depression/PTSD  Depression Bad recently being unable to get out to appts  Seeing Dr Buenrostro about value in life  Has appt next week.      11. Sleep apnea  Doing well with machine    12 DM      13. Lymphedema  Needs new wraps  Working well    14, HypoK  Checking BMP        ROS  6 point ROS neg other than the symptoms noted above in the HPI.    MEDS  Current Outpatient Medications   Medication     ammonium lactate (LAC-HYDRIN) 12 % cream     Ascorbic Acid (VITAMIN C PO)     aspirin 81 MG tablet     atorvastatin (LIPITOR) 40 MG tablet     blood glucose monitoring (ONE TOUCH DELICA) lancets      blood glucose monitoring (ONE TOUCH ULTRA MINI) meter device kit     blood glucose monitoring (ONETOUCH ULTRA) test strip     levothyroxine (SYNTHROID/LEVOTHROID) 175 MCG tablet     lisinopril (PRINIVIL/ZESTRIL) 5 MG tablet     metFORMIN (GLUCOPHAGE) 500 MG tablet     metoprolol succinate ER (TOPROL-XL) 100 MG 24 hr tablet     multivitamin, therapeutic with minerals (MULTI-VITAMIN) TABS tablet     nystatin (MYCOSTATIN) cream     omega 3 1000 MG CAPS     order for DME     order for DME     polyethylene glycol (MIRALAX/GLYCOLAX) packet     potassium chloride (KLOR-CON) 20 MEQ packet     senna-docusate (SENOKOT-S/PERICOLACE) 8.6-50 MG tablet     spironolactone (ALDACTONE) 25 MG tablet     tamsulosin (FLOMAX) 0.4 MG capsule     torsemide (DEMADEX) 100 MG tablet     vitamin B complex with vitamin C (VITAMIN  B COMPLEX) TABS tablet     VITAMIN E COMPLEX PO     warfarin (COUMADIN) 5 MG tablet     metolazone (ZAROXOLYN) 5 MG tablet     warfarin (COUMADIN) 10 MG tablet     zolpidem (AMBIEN) 5 MG tablet     No current facility-administered medications for this visit.          SOC      FHx  Family History   Problem Relation Age of Onset     Depression Mother      Glaucoma Maternal Grandfather      Cancer No family hx of         No family history of skin cancer     Macular Degeneration No family hx of        PHYSICAL EXAMINATION:  Vital signs: /68   Pulse 105   Resp 20   Wt (!) 175.4 kg (386 lb 9.6 oz)   SpO2 99%   BMI 53.92 kg/m      Gen: no distress  Lungs: clear  Cor: irregular  Abd: soft, nontender, no HSM  Ext: in wraps        LABS  Results for orders placed or performed in visit on 02/19/19   Basic Metabolic Panel ([facility])   Result Value Ref Range    Urea Nitrogen 27.1 (H) 7.0 - 21.0 mg/dL    Calcium 9.1 8.5 - 10.1 mg/dL    Chloride 98.3 98.0 - 110.0 mmol/L    Carbon Dioxide 23.4 20.0 - 32.0 mmol/L    Creatinine 1.3 0.7 - 1.3 mg/dL    Glucose 126.7 (H) 70.0 - 99.0 mg'dL    Potassium 3.8 3.3 - 4.5 mmol/dL     Sodium 134.3 132.6 - 141.4 mmol/L    GFR Estimate 58.3 (L) >60.0 mL/min/1.7 m2    GFR Estimate If Black 70.6 >60.0 mL/min/1.7 m2           ASSESSMENT/PLAN    1. (HFpEF) heart failure with preserved ejection fraction (H)  Stable wt  Recent viral URI . Improving now. Breathing bad for a while  BMP good  BNP pending    Concerns for cost of Torsemide (to address with [facility])  Face-to-face done for oxygen orders    - N terminal pro BNP outpatient  - Basic Metabolic Panel ([facility])    2. Pre-syncope  Better with being in bed. Was getting worse prior to viral syndrome  ZioPatch study done  Consider stopping lisinopril and reducing spironolactone ([facility])      3. THONG (obstructive sleep apnea)  Face-to-Face for new CPAP supplies    4. Lymphedema  Stable  Needs new wraps    5. Atrial fibrillation, unspecified type (H)  ZioPatch done. No VT  On anticoagulation    6. Depression with anxiety  Seeing Dr Buenrostro.        JACK CEJA    40min spent with the patient, >50% in arranging care for multiple issues including medicare face-to-face for equipment.  Jack

## 2019-02-19 NOTE — LETTER
February 20, 2019      Clarke Moreira  [address]  [place] [address]        Dear Clarke,    Thank you for getting your care at [facility]. Please see below for your test results.  Your heart test (BNP) and electrolytes were stable. Good news. Hope the visit at [facility] went well    Resulted Orders   N terminal pro BNP outpatient   Result Value Ref Range    N-Terminal Pro Bnp 1,462 (H) 0 - 125 pg/mL      Comment:         Reference range shown and results flagged as abnormal are for the outpatient,   non acute settings. Establishing a baseline value for each individual patient   is useful for follow-up.  Suggested inpatient cut points for confirming diagnosis of CHF in an acute   setting are:   >450 pg/mL (age 18 to less than 50)   >900 pg/mL (age 50 to less than 75)   >1800 pg/mL (75 yrs and older)  An inpatient or emergency department NT-proPBNP <300 pg/mL effectively rules   out acute CHF, with 99% negative predictive value.      Basic Metabolic Panel ([facility])   Result Value Ref Range    Urea Nitrogen 27.1 (H) 7.0 - 21.0 mg/dL    Calcium 9.1 8.5 - 10.1 mg/dL    Chloride 98.3 98.0 - 110.0 mmol/L    Carbon Dioxide 23.4 20.0 - 32.0 mmol/L    Creatinine 1.3 0.7 - 1.3 mg/dL    Glucose 126.7 (H) 70.0 - 99.0 mg'dL    Potassium 3.8 3.3 - 4.5 mmol/dL    Sodium 134.3 132.6 - 141.4 mmol/L    GFR Estimate 58.3 (L) >60.0 mL/min/1.7 m2    GFR Estimate If Black 70.6 >60.0 mL/min/1.7 m2     Sincerely,    Matthias Sanchez MD

## 2019-02-20 ENCOUNTER — TELEPHONE (OUTPATIENT)
Dept: FAMILY MEDICINE | Facility: CLINIC | Age: 69
End: 2019-02-20

## 2019-02-20 ENCOUNTER — MEDICAL CORRESPONDENCE (OUTPATIENT)
Dept: HEALTH INFORMATION MANAGEMENT | Facility: CLINIC | Age: 69
End: 2019-02-20

## 2019-02-20 NOTE — TELEPHONE ENCOUNTER
[facility] phone call message - order or referral request for patient:     Order or referral being requested: Orders    Additional Comments: Home care requesting delayed in skill nursing re certification order. Patient was unavailable this week. Home care nurse will re certify patient between 2/20-2/25.    OK to leave a message on voice mail? Yes    Primary language: English      needed? No    Call taken on February 20, 2019 at 2:53 PM by Susan Saenz

## 2019-02-22 ENCOUNTER — TELEPHONE (OUTPATIENT)
Dept: FAMILY MEDICINE | Facility: CLINIC | Age: 69
End: 2019-02-22

## 2019-02-22 NOTE — TELEPHONE ENCOUNTER
[facility] phone call message - order or referral request for patient:     Order or referral being requested: Skilled nursing one time every other week for eight weeks, three PRN visits to assess and teach disease process and symptom management with a focus on heart disease and DM.      Additional Comments:     OK to leave a message on voice mail? Yes    Primary language: English      needed? No    Call taken on February 22, 2019 at 11:16 AM by Jackie Salvador

## 2019-02-26 DIAGNOSIS — E11.65 TYPE 2 DIABETES MELLITUS WITH HYPERGLYCEMIA, WITHOUT LONG-TERM CURRENT USE OF INSULIN (H): ICD-10-CM

## 2019-02-26 DIAGNOSIS — I50.22 CHRONIC SYSTOLIC CONGESTIVE HEART FAILURE (H): Primary | ICD-10-CM

## 2019-02-26 DIAGNOSIS — I48.91 ATRIAL FIBRILLATION, UNSPECIFIED TYPE (H): Primary | ICD-10-CM

## 2019-02-26 LAB
HBA1C MFR BLD: 6.1 % (ref 0–5.6)
INR PPP: 3.11 (ref 0.86–1.14)

## 2019-02-26 PROCEDURE — 85610 PROTHROMBIN TIME: CPT

## 2019-02-26 PROCEDURE — 36415 COLL VENOUS BLD VENIPUNCTURE: CPT

## 2019-02-26 PROCEDURE — 83036 HEMOGLOBIN GLYCOSYLATED A1C: CPT

## 2019-02-27 ENCOUNTER — TELEPHONE (OUTPATIENT)
Dept: CARDIOLOGY | Facility: CLINIC | Age: 69
End: 2019-02-27

## 2019-02-27 NOTE — TELEPHONE ENCOUNTER
Pt had dental extractions yesterday. Pt is experiencing some bleeding today from the extraction site. Pt asking what to do with his Warfarin. Called pt to request he speak with provider who is managing his warfarin. Reached RYAN SHABAZZ requesting he reach out to provider managing his warfarin dose. Also left return call number in case he had more questions. Laura Phan RN [facility] Care Coordinator

## 2019-03-06 ENCOUNTER — APPOINTMENT (OUTPATIENT)
Dept: GENERAL RADIOLOGY | Facility: CLINIC | Age: 69
DRG: 856 | End: 2019-03-06
Attending: EMERGENCY MEDICINE
Payer: COMMERCIAL

## 2019-03-06 ENCOUNTER — APPOINTMENT (OUTPATIENT)
Dept: MRI IMAGING | Facility: CLINIC | Age: 69
DRG: 856 | End: 2019-03-06
Attending: EMERGENCY MEDICINE
Payer: COMMERCIAL

## 2019-03-06 ENCOUNTER — HOSPITAL ENCOUNTER (INPATIENT)
Facility: CLINIC | Age: 69
LOS: 16 days | Discharge: NURSING, REHAB OR LONG-TERM CARE | DRG: 856 | End: 2019-03-22
Attending: EMERGENCY MEDICINE | Admitting: HOSPITALIST
Payer: COMMERCIAL

## 2019-03-06 ENCOUNTER — APPOINTMENT (OUTPATIENT)
Dept: CT IMAGING | Facility: CLINIC | Age: 69
DRG: 856 | End: 2019-03-06
Attending: EMERGENCY MEDICINE
Payer: COMMERCIAL

## 2019-03-06 ENCOUNTER — TELEPHONE (OUTPATIENT)
Dept: FAMILY MEDICINE | Facility: CLINIC | Age: 69
End: 2019-03-06

## 2019-03-06 DIAGNOSIS — L85.3 DRY SKIN: ICD-10-CM

## 2019-03-06 DIAGNOSIS — I48.91 ATRIAL FIBRILLATION, UNSPECIFIED TYPE (H): Primary | ICD-10-CM

## 2019-03-06 DIAGNOSIS — N17.9 AKI (ACUTE KIDNEY INJURY) (H): ICD-10-CM

## 2019-03-06 DIAGNOSIS — I10 ESSENTIAL HYPERTENSION: ICD-10-CM

## 2019-03-06 DIAGNOSIS — I48.20 CHRONIC ATRIAL FIBRILLATION (H): ICD-10-CM

## 2019-03-06 DIAGNOSIS — E03.9 HYPOTHYROIDISM, UNSPECIFIED TYPE: ICD-10-CM

## 2019-03-06 DIAGNOSIS — I48.0 PAROXYSMAL ATRIAL FIBRILLATION (H): ICD-10-CM

## 2019-03-06 DIAGNOSIS — J01.00 ACUTE MAXILLARY SINUSITIS, RECURRENCE NOT SPECIFIED: ICD-10-CM

## 2019-03-06 DIAGNOSIS — I48.19 PERSISTENT ATRIAL FIBRILLATION (H): ICD-10-CM

## 2019-03-06 DIAGNOSIS — I50.22 CHRONIC SYSTOLIC CONGESTIVE HEART FAILURE (H): ICD-10-CM

## 2019-03-06 DIAGNOSIS — L28.0 LICHEN OF SKIN: ICD-10-CM

## 2019-03-06 DIAGNOSIS — K59.00 CONSTIPATION, UNSPECIFIED CONSTIPATION TYPE: ICD-10-CM

## 2019-03-06 DIAGNOSIS — E78.5 HYPERLIPIDEMIA LDL GOAL <100: ICD-10-CM

## 2019-03-06 DIAGNOSIS — R33.9 URINARY RETENTION: ICD-10-CM

## 2019-03-06 DIAGNOSIS — E11.9 TYPE 2 DIABETES MELLITUS WITHOUT COMPLICATION, WITHOUT LONG-TERM CURRENT USE OF INSULIN (H): ICD-10-CM

## 2019-03-06 DIAGNOSIS — Z78.9 ADEQUATE NUTRITION: ICD-10-CM

## 2019-03-06 DIAGNOSIS — A41.9 SEPSIS (H): ICD-10-CM

## 2019-03-06 DIAGNOSIS — A41.9 SEPTICEMIC (H): ICD-10-CM

## 2019-03-06 DIAGNOSIS — M86.8X0: ICD-10-CM

## 2019-03-06 PROBLEM — I95.9 HYPOTENSION: Status: ACTIVE | Noted: 2019-03-06

## 2019-03-06 LAB
ALBUMIN SERPL-MCNC: 3.5 G/DL (ref 3.4–5)
ALBUMIN UR-MCNC: NEGATIVE MG/DL
ALP SERPL-CCNC: 105 U/L (ref 40–150)
ALT SERPL W P-5'-P-CCNC: 18 U/L (ref 0–70)
ANION GAP SERPL CALCULATED.3IONS-SCNC: 13 MMOL/L (ref 3–14)
APPEARANCE UR: CLEAR
AST SERPL W P-5'-P-CCNC: 14 U/L (ref 0–45)
BASOPHILS # BLD AUTO: 0.1 10E9/L (ref 0–0.2)
BASOPHILS NFR BLD AUTO: 0.5 %
BILIRUB SERPL-MCNC: 0.5 MG/DL (ref 0.2–1.3)
BILIRUB UR QL STRIP: NEGATIVE
BUN SERPL-MCNC: 64 MG/DL (ref 7–30)
CALCIUM SERPL-MCNC: 9.6 MG/DL (ref 8.5–10.1)
CHLORIDE SERPL-SCNC: 99 MMOL/L (ref 94–109)
CO2 BLDCOV-SCNC: 23 MMOL/L (ref 21–28)
CO2 SERPL-SCNC: 24 MMOL/L (ref 20–32)
COLOR UR AUTO: NORMAL
CREAT SERPL-MCNC: 4.15 MG/DL (ref 0.66–1.25)
CRP SERPL-MCNC: 5.9 MG/L (ref 0–8)
DIFFERENTIAL METHOD BLD: ABNORMAL
EOSINOPHIL # BLD AUTO: 0 10E9/L (ref 0–0.7)
EOSINOPHIL NFR BLD AUTO: 0.3 %
ERYTHROCYTE [DISTWIDTH] IN BLOOD BY AUTOMATED COUNT: 13 % (ref 10–15)
ERYTHROCYTE [SEDIMENTATION RATE] IN BLOOD BY WESTERGREN METHOD: 19 MM/H (ref 0–20)
FLUAV+FLUBV RNA SPEC QL NAA+PROBE: NEGATIVE
FLUAV+FLUBV RNA SPEC QL NAA+PROBE: NEGATIVE
GFR SERPL CREATININE-BSD FRML MDRD: 14 ML/MIN/{1.73_M2}
GLUCOSE SERPL-MCNC: 104 MG/DL (ref 70–99)
GLUCOSE UR STRIP-MCNC: NEGATIVE MG/DL
HCT VFR BLD AUTO: 45.1 % (ref 40–53)
HGB BLD-MCNC: 14.8 G/DL (ref 13.3–17.7)
HGB UR QL STRIP: NEGATIVE
HYALINE CASTS #/AREA URNS LPF: 1 /LPF (ref 0–2)
IMM GRANULOCYTES # BLD: 0 10E9/L (ref 0–0.4)
IMM GRANULOCYTES NFR BLD: 0.3 %
INR PPP: 3.92 (ref 0.86–1.14)
INTERPRETATION ECG - MUSE: NORMAL
KETONES UR STRIP-MCNC: NEGATIVE MG/DL
LACTATE BLD-SCNC: 1 MMOL/L (ref 0.7–2)
LACTATE BLD-SCNC: 2.8 MMOL/L (ref 0.7–2.1)
LEUKOCYTE ESTERASE UR QL STRIP: NEGATIVE
LYMPHOCYTES # BLD AUTO: 2.5 10E9/L (ref 0.8–5.3)
LYMPHOCYTES NFR BLD AUTO: 20.7 %
MCH RBC QN AUTO: 30.8 PG (ref 26.5–33)
MCHC RBC AUTO-ENTMCNC: 32.8 G/DL (ref 31.5–36.5)
MCV RBC AUTO: 94 FL (ref 78–100)
MONOCYTES # BLD AUTO: 1.1 10E9/L (ref 0–1.3)
MONOCYTES NFR BLD AUTO: 9.6 %
NEUTROPHILS # BLD AUTO: 8.1 10E9/L (ref 1.6–8.3)
NEUTROPHILS NFR BLD AUTO: 68.6 %
NITRATE UR QL: NEGATIVE
NRBC # BLD AUTO: 0 10*3/UL
NRBC BLD AUTO-RTO: 0 /100
NT-PROBNP SERPL-MCNC: 2329 PG/ML (ref 0–900)
PCO2 BLDV: 39 MM HG (ref 40–50)
PH BLDV: 7.37 PH (ref 7.32–7.43)
PH UR STRIP: 5 PH (ref 5–7)
PLATELET # BLD AUTO: 284 10E9/L (ref 150–450)
PO2 BLDV: 27 MM HG (ref 25–47)
POTASSIUM SERPL-SCNC: 4.7 MMOL/L (ref 3.4–5.3)
PROT SERPL-MCNC: 7.5 G/DL (ref 6.8–8.8)
RADIOLOGIST FLAGS: ABNORMAL
RBC # BLD AUTO: 4.8 10E12/L (ref 4.4–5.9)
RBC #/AREA URNS AUTO: <1 /HPF (ref 0–2)
RSV RNA SPEC NAA+PROBE: NEGATIVE
SAO2 % BLDV FROM PO2: 48 %
SODIUM SERPL-SCNC: 136 MMOL/L (ref 133–144)
SOURCE: NORMAL
SP GR UR STRIP: 1 (ref 1–1.03)
SPECIMEN SOURCE: NORMAL
TROPONIN I BLD-MCNC: 0 UG/L (ref 0–0.08)
UROBILINOGEN UR STRIP-MCNC: NORMAL MG/DL (ref 0–2)
WBC # BLD AUTO: 11.9 10E9/L (ref 4–11)
WBC #/AREA URNS AUTO: 0 /HPF (ref 0–5)

## 2019-03-06 PROCEDURE — 96375 TX/PRO/DX INJ NEW DRUG ADDON: CPT | Performed by: EMERGENCY MEDICINE

## 2019-03-06 PROCEDURE — 96366 THER/PROPH/DIAG IV INF ADDON: CPT | Performed by: EMERGENCY MEDICINE

## 2019-03-06 PROCEDURE — 25000128 H RX IP 250 OP 636: Performed by: EMERGENCY MEDICINE

## 2019-03-06 PROCEDURE — 96361 HYDRATE IV INFUSION ADD-ON: CPT | Performed by: EMERGENCY MEDICINE

## 2019-03-06 PROCEDURE — 80048 BASIC METABOLIC PNL TOTAL CA: CPT | Performed by: STUDENT IN AN ORGANIZED HEALTH CARE EDUCATION/TRAINING PROGRAM

## 2019-03-06 PROCEDURE — 36556 INSERT NON-TUNNEL CV CATH: CPT | Mod: 59 | Performed by: EMERGENCY MEDICINE

## 2019-03-06 PROCEDURE — 99291 CRITICAL CARE FIRST HOUR: CPT | Mod: 25 | Performed by: EMERGENCY MEDICINE

## 2019-03-06 PROCEDURE — 25000128 H RX IP 250 OP 636

## 2019-03-06 PROCEDURE — 84484 ASSAY OF TROPONIN QUANT: CPT

## 2019-03-06 PROCEDURE — 93308 TTE F-UP OR LMTD: CPT | Performed by: EMERGENCY MEDICINE

## 2019-03-06 PROCEDURE — 05JY3ZZ INSPECTION OF UPPER VEIN, PERCUTANEOUS APPROACH: ICD-10-PCS | Performed by: EMERGENCY MEDICINE

## 2019-03-06 PROCEDURE — 70450 CT HEAD/BRAIN W/O DYE: CPT

## 2019-03-06 PROCEDURE — 70490 CT SOFT TISSUE NECK W/O DYE: CPT

## 2019-03-06 PROCEDURE — 85027 COMPLETE CBC AUTOMATED: CPT | Performed by: STUDENT IN AN ORGANIZED HEALTH CARE EDUCATION/TRAINING PROGRAM

## 2019-03-06 PROCEDURE — 99223 1ST HOSP IP/OBS HIGH 75: CPT | Mod: GC | Performed by: HOSPITALIST

## 2019-03-06 PROCEDURE — 12000004 ZZH R&B IMCU UMMC

## 2019-03-06 PROCEDURE — 36415 COLL VENOUS BLD VENIPUNCTURE: CPT | Performed by: STUDENT IN AN ORGANIZED HEALTH CARE EDUCATION/TRAINING PROGRAM

## 2019-03-06 PROCEDURE — 71045 X-RAY EXAM CHEST 1 VIEW: CPT | Mod: 76

## 2019-03-06 PROCEDURE — 82803 BLOOD GASES ANY COMBINATION: CPT

## 2019-03-06 PROCEDURE — 93005 ELECTROCARDIOGRAM TRACING: CPT | Performed by: EMERGENCY MEDICINE

## 2019-03-06 PROCEDURE — 83735 ASSAY OF MAGNESIUM: CPT | Performed by: STUDENT IN AN ORGANIZED HEALTH CARE EDUCATION/TRAINING PROGRAM

## 2019-03-06 PROCEDURE — 96367 TX/PROPH/DG ADDL SEQ IV INF: CPT | Performed by: EMERGENCY MEDICINE

## 2019-03-06 PROCEDURE — 80053 COMPREHEN METABOLIC PANEL: CPT | Performed by: EMERGENCY MEDICINE

## 2019-03-06 PROCEDURE — 73660 X-RAY EXAM OF TOE(S): CPT | Mod: 50

## 2019-03-06 PROCEDURE — 83605 ASSAY OF LACTIC ACID: CPT

## 2019-03-06 PROCEDURE — 25800030 ZZH RX IP 258 OP 636

## 2019-03-06 PROCEDURE — 85652 RBC SED RATE AUTOMATED: CPT | Performed by: PHYSICIAN ASSISTANT

## 2019-03-06 PROCEDURE — 71045 X-RAY EXAM CHEST 1 VIEW: CPT

## 2019-03-06 PROCEDURE — 36556 INSERT NON-TUNNEL CV CATH: CPT | Performed by: EMERGENCY MEDICINE

## 2019-03-06 PROCEDURE — 87040 BLOOD CULTURE FOR BACTERIA: CPT | Performed by: EMERGENCY MEDICINE

## 2019-03-06 PROCEDURE — 87631 RESP VIRUS 3-5 TARGETS: CPT | Performed by: EMERGENCY MEDICINE

## 2019-03-06 PROCEDURE — 99285 EMERGENCY DEPT VISIT HI MDM: CPT | Mod: 25 | Performed by: EMERGENCY MEDICINE

## 2019-03-06 PROCEDURE — 86140 C-REACTIVE PROTEIN: CPT | Performed by: EMERGENCY MEDICINE

## 2019-03-06 PROCEDURE — 83605 ASSAY OF LACTIC ACID: CPT | Performed by: EMERGENCY MEDICINE

## 2019-03-06 PROCEDURE — 85610 PROTHROMBIN TIME: CPT | Performed by: EMERGENCY MEDICINE

## 2019-03-06 PROCEDURE — 93308 TTE F-UP OR LMTD: CPT | Mod: 26 | Performed by: EMERGENCY MEDICINE

## 2019-03-06 PROCEDURE — 70544 MR ANGIOGRAPHY HEAD W/O DYE: CPT

## 2019-03-06 PROCEDURE — 81001 URINALYSIS AUTO W/SCOPE: CPT | Performed by: EMERGENCY MEDICINE

## 2019-03-06 PROCEDURE — 70551 MRI BRAIN STEM W/O DYE: CPT

## 2019-03-06 PROCEDURE — 96365 THER/PROPH/DIAG IV INF INIT: CPT | Performed by: EMERGENCY MEDICINE

## 2019-03-06 PROCEDURE — 85025 COMPLETE CBC W/AUTO DIFF WBC: CPT | Performed by: EMERGENCY MEDICINE

## 2019-03-06 PROCEDURE — 93010 ELECTROCARDIOGRAM REPORT: CPT | Mod: 59 | Performed by: EMERGENCY MEDICINE

## 2019-03-06 PROCEDURE — 40000739 ZZH STATISTIC STROKE CODE W/O ACCESS

## 2019-03-06 PROCEDURE — 83880 ASSAY OF NATRIURETIC PEPTIDE: CPT | Performed by: EMERGENCY MEDICINE

## 2019-03-06 RX ORDER — ATORVASTATIN CALCIUM 40 MG/1
40 TABLET, FILM COATED ORAL AT BEDTIME
Status: DISCONTINUED | OUTPATIENT
Start: 2019-03-06 | End: 2019-03-22 | Stop reason: HOSPADM

## 2019-03-06 RX ORDER — METOCLOPRAMIDE HYDROCHLORIDE 5 MG/ML
10 INJECTION INTRAMUSCULAR; INTRAVENOUS ONCE
Status: COMPLETED | OUTPATIENT
Start: 2019-03-06 | End: 2019-03-06

## 2019-03-06 RX ORDER — PIPERACILLIN SODIUM, TAZOBACTAM SODIUM 4; .5 G/20ML; G/20ML
4.5 INJECTION, POWDER, LYOPHILIZED, FOR SOLUTION INTRAVENOUS ONCE
Status: COMPLETED | OUTPATIENT
Start: 2019-03-06 | End: 2019-03-06

## 2019-03-06 RX ORDER — ONDANSETRON 2 MG/ML
4 INJECTION INTRAMUSCULAR; INTRAVENOUS EVERY 6 HOURS PRN
Status: DISCONTINUED | OUTPATIENT
Start: 2019-03-06 | End: 2019-03-22 | Stop reason: HOSPADM

## 2019-03-06 RX ORDER — ONDANSETRON 4 MG/1
4 TABLET, ORALLY DISINTEGRATING ORAL EVERY 6 HOURS PRN
Status: DISCONTINUED | OUTPATIENT
Start: 2019-03-06 | End: 2019-03-22 | Stop reason: HOSPADM

## 2019-03-06 RX ORDER — NALOXONE HYDROCHLORIDE 0.4 MG/ML
.1-.4 INJECTION, SOLUTION INTRAMUSCULAR; INTRAVENOUS; SUBCUTANEOUS
Status: DISCONTINUED | OUTPATIENT
Start: 2019-03-06 | End: 2019-03-22 | Stop reason: HOSPADM

## 2019-03-06 RX ORDER — NOREPINEPHRINE BITARTRATE/D5W 16MG/250ML
0.03-0.4 PLASTIC BAG, INJECTION (ML) INTRAVENOUS CONTINUOUS
Status: DISCONTINUED | OUTPATIENT
Start: 2019-03-06 | End: 2019-03-06

## 2019-03-06 RX ORDER — NYSTATIN 100000 U/G
CREAM TOPICAL 2 TIMES DAILY PRN
Status: DISCONTINUED | OUTPATIENT
Start: 2019-03-07 | End: 2019-03-22 | Stop reason: HOSPADM

## 2019-03-06 RX ORDER — CHLORHEXIDINE GLUCONATE ORAL RINSE 1.2 MG/ML
15 SOLUTION DENTAL 2 TIMES DAILY
Status: DISCONTINUED | OUTPATIENT
Start: 2019-03-07 | End: 2019-03-22 | Stop reason: HOSPADM

## 2019-03-06 RX ORDER — ASPIRIN 81 MG/1
81 TABLET ORAL DAILY
Status: DISCONTINUED | OUTPATIENT
Start: 2019-03-07 | End: 2019-03-22 | Stop reason: HOSPADM

## 2019-03-06 RX ORDER — DIPHENHYDRAMINE HYDROCHLORIDE 50 MG/ML
25 INJECTION INTRAMUSCULAR; INTRAVENOUS ONCE
Status: COMPLETED | OUTPATIENT
Start: 2019-03-06 | End: 2019-03-06

## 2019-03-06 RX ADMIN — VANCOMYCIN HYDROCHLORIDE 2500 MG: 10 INJECTION, POWDER, LYOPHILIZED, FOR SOLUTION INTRAVENOUS at 12:31

## 2019-03-06 RX ADMIN — PIPERACILLIN SODIUM AND TAZOBACTAM SODIUM 4.5 G: 4; .5 INJECTION, POWDER, LYOPHILIZED, FOR SOLUTION INTRAVENOUS at 14:33

## 2019-03-06 RX ADMIN — DIPHENHYDRAMINE HYDROCHLORIDE 25 MG: 50 INJECTION INTRAMUSCULAR; INTRAVENOUS at 16:15

## 2019-03-06 RX ADMIN — METOCLOPRAMIDE 10 MG: 5 INJECTION, SOLUTION INTRAMUSCULAR; INTRAVENOUS at 12:59

## 2019-03-06 RX ADMIN — SODIUM CHLORIDE 500 ML: 9 INJECTION, SOLUTION INTRAVENOUS at 11:51

## 2019-03-06 ASSESSMENT — ENCOUNTER SYMPTOMS
VOMITING: 0
SORE THROAT: 0
ABDOMINAL DISTENTION: 1
CHEST TIGHTNESS: 0
MYALGIAS: 1
BACK PAIN: 1
SHORTNESS OF BREATH: 0
BLOOD IN STOOL: 0
COUGH: 0
WEAKNESS: 1
WOUND: 1
TROUBLE SWALLOWING: 0
NAUSEA: 1
HEADACHES: 1
NECK PAIN: 1
CONSTIPATION: 0
NECK STIFFNESS: 1
ABDOMINAL PAIN: 0
FEVER: 0
DIARRHEA: 1
DYSURIA: 0
DIZZINESS: 1

## 2019-03-06 NOTE — CONSULTS
[facility] [place]      Neurology Stroke Consult    Patient Name: Clarke Moreira  : 1950 MRN#: [id number]    STROKE DATA    Stroke Code:  Time called:  2019 1342  Time patient seen:  2019 1346  Onset of symptoms: 10+ days prior with vague symptoms   Last known normal (pt's baseline):  pt vague symptoms states since dental procedure  Head CT read by Dr. Cantu at:  2019 1347  Stroke Code de-escalated at 2019 1351 after discussion with Dr. MARTY Cantu due to patient is outside emergent treatment time parameters.     TPA treatment:  Not given due to outside the time window.     National Institutes of Health Stroke Scale (at presentation)  NIHSS done at:  time patient seen      Score    Level of consciousness:  (0)   Alert, keenly responsive     LOC questions:  (0)   Answers both questions correctly    LOC commands:  (0)   Performs both tasks correctly    Best gaze:  (0)   Normal    Visual:  (0)   No visual loss    Facial palsy:  (0)   Normal symmetrical movements    Motor arm (left):  (0)   No drift    Motor arm (right):  (0)   No drift    Motor leg (left):  (0)   No drift    Motor leg (right):  (0)   No drift    Limb ataxia:  (0)   Absent    Sensory:  (0)   Normal- no sensory loss    Best language:  (0)   Normal- no aphasia    Dysarthria:  (0)   Normal    Extinction and inattention:  (0)   No abnormality        NIHSS Total Score:  0        Dysphagia Screen  Time of screening: not indicated      ASSESSMENT & RECOMMENDATIONS     The patient was seen for vague feelings on fatigue and dizziness since coming out of anesthesia after a dental procedure over a week ago.  Patient was found to have subacute BL PCA seen on CT in the ED and stroke code was called for imaging recs with chronic kidney disease.      Impression:   Patients main concern is dizziness which started immediately after dental procedure. Ct soft tissue showed odontogenic sinusitis with  complex/hemorrhagic debris overlying site of dental extraction. Osteomyelitis present on LE x-rays as well. Patient is hypotensive with mild leukocytosis, and patient will likely need admission and management with general medical team.     The differential for his dizziness is broad but seems more likely related to his infection/hypotension. Patient certainly has risk factors for stroke, the findings on CT warrant follow up and better characterization with MRI. With cerebellar infarcts it is possible that there is some contribution to dizziness.     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   -on AC (warfarin) INR 3.92, no further medical management warranted   -hg A1C 6.1, LDL 55 (goal <70)  -strict bp control   -stroke will sign off      The patient will likely me admitted to a medical team     HPI  Clarke Moreira is a 68 year old male with pmh A-fib s/p ablation 2008 on warfarin, HFpEF, HTN, DM, THONG who presents for progressive vague symptoms of dizziness and fatigue with central visual disturbances described as white spots in his central vision. Also reports HA/neck pain, which are worse than his baseline. He states these symptoms have been ongoing since his tooth abstraction and there have been no acute changes today. He denies history of stroke, denies any particular area of weakness, denies speech changes, denies unilateral vision loss or sensory changes. Denies history of stroke, has not missed warfarin dose, states his INR tends to run high.     Pertinent Past Medical/Surgical History  Past Medical History:   Diagnosis Date     Atrial fibrillation (H)      Basal cell carcinoma      Chronic anticoagulation      Diastolic heart failure (H)      Dyslipidemia      Essential hypertension      Morbid obesity (H)      Sleep-disordered breathing      Type 2 diabetes mellitus (H)        Past Surgical History:   Procedure  Laterality Date     BIOPSY OF SKIN LESION       MOHS MICROGRAPHIC PROCEDURE       PICC INSERTION Right 09/24/2017    5fr TL Bard PICC, 51cm (1cm external) in the R medial brachial vein w/ tip in the SVC.       Medications: I have reviewed this patient's current medications.    Allergies: No Known Allergies.    Family History:   Family History   Problem Relation Age of Onset     Depression Mother      Glaucoma Maternal Grandfather      Cancer No family hx of         No family history of skin cancer     Macular Degeneration No family hx of    .    Social History:   Social History     Tobacco Use     Smoking status: Never Smoker     Smokeless tobacco: Never Used   Substance Use Topics     Alcohol use: No     Comment: Former alcohol abuse. Quit 70s then quit later in 80s-present   .    Tobacco use: Never    ROS:  The 10 point Review of Systems is negative other than noted in the HPI or here.     PHYSICAL EXAMINATION  Vital Signs:  B/P: 95/57,  T: 97.7,  P: 87,  R: 18    General:  patient lying in bed without any acute distress    HEENT:  normocephalic/atraumatic  Cardio:  RRR  Pulmonary:  no respiratory distress  Abdomen:  soft, non-tender  Extremities:  mild peripheral edema   Skin:  distal chronic soft tissue changes related to DM     Neurologic  Mental Status:  fully alert, attentive and oriented, follows commands, speech clear and fluent  Cranial Nerves:  visual fields intact, EOMI with normal smooth pursuit, facial sensation intact and symmetric, facial movements symmetric, hearing not formally tested but intact to conversation  Motor:  no abnormal movements, normal tone throughout, strength 5/5 throughout upper and lower extremities, except 4+/5 hip flexion BL.   Reflexes:  not assessed   Sensory:  intact/symmetric to light touch and pin prick throughout upper and lower extremities  Coordination:  FNF and HS intact without dysmetria  Station/Gait:  not assessed     Labs  Labs and Imaging reviewed and used in  developing the plan; pertinent results included.     Lab Results   Component Value Date     (H) 03/06/2019     INR   Date Value Ref Range Status   03/06/2019 3.92 (H) 0.86 - 1.14 Final     Comment:     Critical Value called to and read back by   Clarke Capellan ED 03/06/19 1334 by BN          The patient was discussed with the Fellow, Dr. Cabrera.  The staff is Dr. Cantu.    Fara Nguyen MD   Pager: [phone]

## 2019-03-06 NOTE — ED PROVIDER NOTES
"    [facility] EMERGENCY DE[facility]MENT ([facility])  3/06/19    History     Chief Complaint   Patient presents with     Dizziness     Hypotension     The history is provided by the patient and medical records.     Clarke Moreira is a 68 year old male with a history of HFpEF, atrial fibrillation (anticoagulated on warfarin), PAD, DM II and HTN who presents to the Emergency Department for evaluation of low blood pressure and dizziness. Patient states that he has had bilateral arm, shoulder and neck pain for \"a while\" which significantly worsened over the past couple days. He notes that he has been feeling off balance and had difficulty dressing himself this morning which is new for him. Additionally, he reports a headache located near the left eye which has been worsening since last Tuesday (2/26) when he had his wisdom teeth removed. He states that the headache occasionally causes him to become dizzy but denies spinning sensation. Associated with right eye visual disturbance that improves when supine. Describes a bright light in central vision. Patient also reports occasional chest pain and worsening abdominal distention. He has also had nausea and diarrhea that developed after his wisdom teeth removal on Tuesday (2/26). Denies vomiting, fever, cough, abdominal pain. He was told he had a \"dry socket\" and mild infection at sight of tooth extractions but was not started on any abx for this. He c/o dental pain and neck pain/stiffness since the procedure.     Additionally, patient reports new wounds on his feet and blisters developing on left leg. He states that he was diagnosed with influenza in January and has not been able to change his stockings since that time; however, he was able to look at his feet and notes that they are in \"bad shape\" and look like they have developed bruising and blistering.     Current Facility-Administered Medications   Medication     norepinephrine (LEVOPHED) 16 mg in D5W 250 mL " infusion     vancomycin place beyer - receiving intermittent dosing     Current Outpatient Medications   Medication     ammonium lactate (LAC-HYDRIN) 12 % cream     Ascorbic Acid (VITAMIN C PO)     aspirin 81 MG tablet     atorvastatin (LIPITOR) 40 MG tablet     blood glucose monitoring (ONE TOUCH DELICA) lancets     blood glucose monitoring (ONE TOUCH ULTRA MINI) meter device kit     blood glucose monitoring (ONETOUCH ULTRA) test strip     levothyroxine (SYNTHROID/LEVOTHROID) 175 MCG tablet     lisinopril (PRINIVIL/ZESTRIL) 5 MG tablet     metFORMIN (GLUCOPHAGE) 500 MG tablet     metoprolol succinate ER (TOPROL-XL) 100 MG 24 hr tablet     multivitamin, therapeutic with minerals (MULTI-VITAMIN) TABS tablet     nystatin (MYCOSTATIN) cream     omega 3 1000 MG CAPS     order for DME     order for DME     polyethylene glycol (MIRALAX/GLYCOLAX) packet     potassium chloride (KLOR-CON) 20 MEQ packet     senna-docusate (SENOKOT-S/PERICOLACE) 8.6-50 MG tablet     spironolactone (ALDACTONE) 25 MG tablet     tamsulosin (FLOMAX) 0.4 MG capsule     torsemide (DEMADEX) 100 MG tablet     vitamin B complex with vitamin C (VITAMIN  B COMPLEX) TABS tablet     VITAMIN E COMPLEX PO     warfarin (COUMADIN) 5 MG tablet     Past Medical History:   Diagnosis Date     Atrial fibrillation (H)      Basal cell carcinoma      Chronic anticoagulation      Diastolic heart failure (H)      Dyslipidemia      Essential hypertension      Morbid obesity (H)      Sleep-disordered breathing      Type 2 diabetes mellitus (H)        Past Surgical History:   Procedure Laterality Date     BIOPSY OF SKIN LESION       MOHS MICROGRAPHIC PROCEDURE       PICC INSERTION Right 09/24/2017    5fr TL Bard PICC, 51cm (1cm external) in the R medial brachial vein w/ tip in the SVC.       Family History   Problem Relation Age of Onset     Depression Mother      Glaucoma Maternal Grandfather      Cancer No family hx of         No family history of skin cancer      Macular Degeneration No family hx of        Social History     Tobacco Use     Smoking status: Never Smoker     Smokeless tobacco: Never Used   Substance Use Topics     Alcohol use: No     Comment: Former alcohol abuse. Quit 70s then quit later in 80s-present     No Known Allergies    I have reviewed the Medications, Allergies, Past Medical and Surgical History, and Social History in the Epic system.    Review of Systems   Constitutional: Negative for fever.   HENT: Positive for dental problem. Negative for sore throat and trouble swallowing.    Eyes: Positive for visual disturbance.   Respiratory: Negative for cough, chest tightness and shortness of breath.    Cardiovascular: Positive for chest pain. Negative for leg swelling.   Gastrointestinal: Positive for abdominal distention, diarrhea and nausea. Negative for abdominal pain, blood in stool, constipation and vomiting.   Genitourinary: Negative for dysuria.   Musculoskeletal: Positive for back pain, myalgias, neck pain and neck stiffness.   Skin: Positive for rash and wound (bilateral foot blistering).   Neurological: Positive for dizziness, weakness (bilateral arms/shoulders/neck) and headaches (located near left eye). Negative for syncope.   All other systems reviewed and are negative.      Physical Exam   BP: (!) 74/57  Pulse: 105  Heart Rate: 97  Temp: 97.7  F (36.5  C)  Resp: 18  Weight: (!) 168.5 kg (371 lb 7.6 oz)  SpO2: 100 %      Physical Exam   Constitutional: He is oriented to person, place, and time. He appears well-developed and well-nourished. No distress.   HENT:   Head: Normocephalic and atraumatic.   Nose: Nose normal.   Mouth/Throat: Uvula is midline and oropharynx is clear and moist. Mucous membranes are dry. No trismus in the jaw. Abnormal dentition. No dental abscesses. No oropharyngeal exudate, posterior oropharyngeal edema, posterior oropharyngeal erythema or tonsillar abscesses. No tonsillar exudate.       Bilateral upper wisdom teeth  extracted. No gingival swelling, erythema, drainage   Eyes: Conjunctivae are normal. Pupils are equal, round, and reactive to light. No scleral icterus. Right eye exhibits no nystagmus. Left eye exhibits no nystagmus.   Difficulty tracking for exam of EOM   Neck: Normal range of motion and phonation normal. Neck supple. No tracheal tenderness present. No neck rigidity. No edema, no erythema and normal range of motion present.   Cardiovascular: Normal rate, regular rhythm, normal heart sounds and intact distal pulses.   No murmur heard.  Pulmonary/Chest: Effort normal and breath sounds normal. No stridor. No respiratory distress. He has no wheezes. He has no rales.   Abdominal: Soft. He exhibits no distension. There is no tenderness. There is no rebound and no guarding.   Musculoskeletal: Normal range of motion. He exhibits tenderness. He exhibits no deformity.   Neurological: He is alert and oriented to person, place, and time.   Skin: Skin is warm and dry. He is not diaphoretic. There is erythema.        Erythema and scattered petechiae across bilateral anterior legs.  Mildly tender to palpation with trace pitting edema.  There are 2 small bullae forming on the midshaft of the left tibial surface.  Bilateral great toes with black eschar on the very distal tip.  Movement intact in all 10 toes.  Delayed cap refill.  No open draining wounds. Has diminished distal sensation. Pedal pulses intact bilaterally.   Psychiatric: He has a normal mood and affect. His behavior is normal.   Nursing note and vitals reviewed.      ED Course        Central line  Date/Time: 3/6/2019 7:07 PM  Performed by: Patricia Faith MD  Authorized by: Patricia Faith MD   Consent: The procedure was performed in an emergent situation. Verbal consent obtained. Written consent not obtained.  Indications: vascular access  Anesthesia: local infiltration    Anesthesia:  Local Anesthetic: lidocaine 1% without epinephrine    Sedation:  Patient sedated:  no    Preparation: skin prepped with chlorhexidine  Location details: right subclavian  Patient position: flat  Catheter type: triple lumen  Pre-procedure: landmarks identified  Ultrasound guidance: no  Number of attempts: 4  Successful placement: no  Patient tolerance: Patient tolerated the procedure well with no immediate complications        Results for orders placed during the hospital encounter of 03/06/19   POC US ECHO LIMITED    Impression Limited Bedside Cardiac Ultrasound, performed and interpreted by me.   Indication: Hypotension/shock.  Parasternal long axis, parasternal short axis and subcostal views were acquired.   Image quality was satisfactory.    Findings:    Global left ventricular function appears intact.  Chambers do not appear dilated.  There is no evidence of free fluid within the pericardium.    IMPRESSION: Grossly normal left ventricular function and chamber size.  No pericardial effusion.          EKG Interpretation:      Interpreted by Patricia Faith MD  Time reviewed: 11:11  Symptoms at time of EKG: Dizziness/hypotension  Rhythm: Atrial fibrillation    Rate: 92 bpm  Axis: Normal  Ectopy: None  Conduction: Low voltage QRS   ST Segments/ T Waves: No ST-T wave changes  Q Waves: None  Comparison to prior: No acute ischemic changes     Clinical Impression: No acute ischemic changes         Critical Care time:  was 47 minutes for this patient excluding procedures.     The patient has signs of Severe Sepsis as evidenced by:    1. 2 SIRS criteria, AND  2. Suspected infection, AND   3. Organ dysfunction:  SBP <90, MAP < 65, or SBP decrease of >40 from baseline due to infection, Lactic Acid > 1.9 and ARF with Cr >2 due to infection    Time severe sepsis diagnosis confirmed = 1059 as this was the time when SBP <90 or MAP <65 and Lactate resulted, and the level was > 1.9      3 Hour Severe Sepsis Bundle Completion:  1. Initial Lactic Acid Result:   Recent Labs   Lab Test 03/06/19  1342 03/06/19  1059  01/05/18  1109   LACT 1.0 2.8* 1.1     2. Blood Cultures before Antibiotics: Yes  3. Broad Spectrum Antibiotics Administered: Yes     Anti-infectives (From now, onward)    Start     Dose/Rate Route Frequency Ordered Stop    03/06/19 1203  vancomycin [facility]yer - receiving intermittent dosing      1 each Intravenous SEE ADMIN INSTRUCTIONS 03/06/19 1203          4. 2000 ml of IV fluids.  Ideal body weight: 75.3 kg (166 lb 0.1 oz)  Adjusted ideal body weight: 112.6 kg (248 lb 3.1 oz)    Severe Sepsis reassessment:  1. Repeat Lactic Acid Level: 1.0  2. MAP>65 after initial IVF bolus, will continue to monitor fluid status and vital signs               Labs Ordered and Resulted from Time of ED Arrival Up to the Time of Departure from the ED   CBC WITH PLATELETS DIFFERENTIAL - Abnormal; Notable for the following components:       Result Value    WBC 11.9 (*)     All other components within normal limits   COMPREHENSIVE METABOLIC PANEL - Abnormal; Notable for the following components:    Glucose 104 (*)     Urea Nitrogen 64 (*)     Creatinine 4.15 (*)     GFR Estimate 14 (*)     GFR Estimate If Black 16 (*)     All other components within normal limits   NT PROBNP INPATIENT - Abnormal; Notable for the following components:    N-Terminal Pro BNP Inpatient 2,329 (*)     All other components within normal limits   INR - Abnormal; Notable for the following components:    INR 3.92 (*)     All other components within normal limits   ISTAT  GASES LACTATE MANDY POCT - Abnormal; Notable for the following components:    PCO2 Venous 39 (*)     Lactic Acid 2.8 (*)     All other components within normal limits   ROUTINE UA WITH MICROSCOPIC REFLEX TO CULTURE   LACTIC ACID WHOLE BLOOD   ERYTHROCYTE SEDIMENTATION RATE AUTO   CRP INFLAMMATION   CARDIAC CONTINUOUS MONITORING   PULSE OXIMETRY NURSING   PERIPHERAL IV CATHETER   PERIPHERAL IV CATHETER   IV ACCESS   ISTAT TROPONIN NURSING POCT   ISTAT CG4 GASES LACTATE MANDY NURSING POCT   CARDIAC  CONTINUOUS MONITORING   PULSE OXIMETRY NURSING   CARDIAC CONTINUOUS MONITORING   MEASURE URINE OUTPUT   PATIENT CARE ORDER   TROPONIN POCT   INFLUENZA A AND B AND RSV PCR   BLOOD CULTURE   BLOOD CULTURE            Assessments & Plan (with Medical Decision Making)   Clarke Moreira is a 68 year old male with a history of HFpEF, atrial fibrillation (anticoagulated on warfarin), PAD, DM II and HTN who presents to the Emergency Department for evaluation of low blood pressure and dizziness.     Ddx: cellulitis, retropharyngeal abscess, gastroenteritis, hypovolemia, sepsis    She is hypotensive on arrival but mentating appropriately.  Planing of multiple vague neurologic symptoms such as visual disturbance, neck pain, headache, dizziness.  Having trouble tracking on test of extraocular motion but otherwise no focal neurologic deficits.  Has been taking poor p.o. since his recent dental surgery.  Also complaining of increased pain in the neck and upper back since his operation.  No evidence of meningismus, nuchal rigidity, trismus on exam but patient does report subjective neck stiffness.  Overall patient appears hypovolemic on exam consistent with patient's poor p.o. Intake and diarrhea.  Given IV fluids with improvement in blood pressure.  Lactic acid elevated.  Patient does have cellulitic appearing rash on bilateral lower extremities.  He was covered broadly with vancomycin and Zosyn.  Patient has acute kidney injury with a poor GFR so defer contrast imaging.  Will obtain a CT soft tissue of the neck without contrast for evaluation of abscess.     Patient imaging notable for subacute or chronic bilateral posterior cerebral artery distribution infarcts without associated mass-effect or underlying intracranial hemorrhages.  MRI recommended for better evaluation and to screen for acute infarcts. Also findings concerning for cellulitis over the site of the left maxillary wisdom tooth extraction.  No focal collection or  abscess.  Findings compatible with odontogenic sinusitis with complex or hemorrhagic debris in the left maxillary sinus.  X-rays show osteomyelitis to the tips of bilateral great toes. Also noted fracture of the left distal first phalanx.    Given the acuity of patient's presenting neurologic symptoms I called a stroke code to expedite workup.  Will discuss appropriate MRI imaging with neurology with respect to patient's acute kidney injury.  Orthopedic surgery consulted for osteomyelitis and will discuss with dental regarding maxillary sinusitis and odontogenic findings.    Neurology consulted and recommended obtaining MRI brain for stroke and [place] without contrast.  Orthopedic surgery consulted and agreed with broad-spectrum antibiotics vancomycin and Zosyn.  They will consult podiatry follow patient after admission and recommend wound care consult.  No specific follow-up requirements.  Nonsurgical.  Dental surgery consulted and currently staffing case with dental attending.  They may consult OMFS.  Of note, patient was managed in our dental clinic for his wisdom teeth extraction.    After return from MRI, patient complaining of recurrent visual disturbance. Hypotensive and less responsive. Verbally consented to placing a central line, if needed. Given an additional liter of IVF without persistent improvement. Systolic in 80s. Patient lethargic and unable to provide formal consent at this time. Prepped and draped patient for placement of a right subclavian line. Patient has short neck with beard so internal jugular not easily accessible. Patient with improved mentation after being positioned supine. Woke up and very anxious about covering head with sterile drape. Discussed risks and benefits and indication for central access. Patient again gave verbal consent. However, after multiple failed attempts at right subclavian, plan was to transition to right femoral line. Patient now angry and refusing line.  MAP in 50s so gave patient a break.    MRA imaging shows no evidence of acute infarct.  There are focal areas of encephalomalacia in bilateral occipital lobes likely from a previous infarct.  Neurology recommends medical admission.  Nothing to do about the chronic stroke.    After returning to discuss further management with the patient, he is still declining central access.  He understands the risks of untreated hypotension and reiterates that he is DNR DNI and does not want to have a central line.  Patient admitted to internal medicine intermediate level care.  After discussing with the triage hospitalist she requested that I speak with the ICU to determine if they would be willing to take the patient for peripheral pressors.     ICU accepted for admission.    I have reviewed the nursing notes.    I have reviewed the findings, diagnosis, plan and need for follow up with the patient.       Medication List      There are no discharge medications for this visit.         Final diagnoses:   Sepsis (H)   Other osteomyelitis, multiple sites (H)   Acute maxillary sinusitis, recurrence not specified   MICHELLE (acute kidney injury) (H)   I, Leslie Collado, am serving as a trained medical scribe to document services personally performed by Patricia Faith MD, based on the provider's statements to me.   Patricia RO MD, was physically present and have reviewed and verified the accuracy of this note documented by Leslie Collado.      3/6/2019   [facility], [place], EMERGENCY DEPARTMENT     Patricia Faith MD  03/06/19 2038

## 2019-03-06 NOTE — TELEPHONE ENCOUNTER
[facility] phone call message-patient reporting a symptom:     Symptom: vertigo, impaired vision, weakness/numbness of shoulder/arm/neck/upper back, pain, headache.     Same Day Visit Offered: Wanted to speak with nurse.    Additional comments: Transferred to triage nurse.    OK to leave message on voice mail? Yes    Primary language: English      needed? No    Call taken on March 6, 2019 at 9:02 AM by Susan Saenz

## 2019-03-06 NOTE — PHARMACY
Pharmacy Stroke Code Response  Pharmacist responded as part of the Stroke Code Team activation to patient care area ED.  The Stroke Team determined that the patient was not a candidate for IV alteplase therapy and the pharmacy team was dismissed at 1345.    Janell Ponce, MirtaD

## 2019-03-06 NOTE — PHARMACY-VANCOMYCIN DOSING SERVICE
Pharmacy- Vancomycin Initiation    Pharmacy has received a consult to initiate vancomycin for this patient.      Clarke Moreira is a 68 year old male with an order for vancomycin to be initiated on March 6, 2019.    Vancomycin Indication: Sepsis and Skin and Soft Tissue Infection  Goal Trough Level: 15-20 mg/L    Renal Function: Worsening    CrCl = Estimated Creatinine Clearance: 27.1 mL/min (A) (based on SCr of 4.15 mg/dL (H)).    Assessment/Plan:      1) Recommend starting vancomycin 2500 mg IV x 1 loading dose (22 mg/kg using AdjBW = 112.6 kg) then continue intermittent dosing based on vancomycin levels given current renal function. [facility].  2) Serum creatinine levels have been ordered daily for the first week of therapy and at least twice weekly for subsequent weeks.  We will follow patient s renal function and clinical response while the patient receives vancomycin.    3) If therapy is formally approved beyond 48 hours by the infectious disease or antimicrobial team, a vancomycin trough level will be set up and evaluated.      Mary Jimenez, PGY-1 Pharmacy Resident

## 2019-03-06 NOTE — CONSULTS
"[facility]  ORTHOPAEDIC SURGERY CONSULT - HISTORY AND PHYSICAL    DATE OF CONSULT: 3/6/2019   REQUESTING PROVIDER: Patricia Faith MD, MD    TIME OF CONSULT: 1500  TIME CONSULT PAGE RETURNED TO     CC: bilateral foot wounds  DATE OF INJURY: chronic, noticed by patient on 03/04/2019      HISTORY OF PRESENT ILLNESS:   Clarke Moreira is a 68 year old male with PMH including HFpEF, afib (on warfarin), PAD, DM2, and HTN who presents today for complaint of bilateral foot wounds. The patient reports that he has an extensive history of diabetic ulcers on his feet (bilaterally) for which he follows with Dr. Lagos of podiatry at [facility]. Most recently, he was seen 01/15/2019 for continued care of a wound on the left hallux and onchomychosis. A sharp debridement of the left hallux was performed and the patient recovered without issue. Since the time of the appointment, the patient fell ill with \"the flu\" and was unable to perform the daily wound cares. Approximately ten days ago, a home care nurse was out to his residence to assist with dressing changes. At that time he reports his feet looked appropriate. He did not perform additional cares to his feet until Monday, 3/4/2019, and it was at that time that he noticed the development of a black sore on the distal aspect of his great toes bilaterally. He denies any associated pain or draining, but was alarmed by the changed appearance of his feet. In addition, he also began to note associated redness and swelling of the lower legs bilaterally. He denies experiencing similar symptoms in the past, and denies any previous diagnoses of osteomyelitis. He initially employed avoidance and rest for symptom management. At baseline, the patient endorses limited sensation of the lower extremities bilaterally.    Since Monday, the patient has gradually become weaker, reporting worsening fatigue and malaise. It culminated this morning when the patient was unable to ambulate " with his walker without experience extreme fatigue. Concerned, he presented to the ED.       In addition to the above complaints, the patient also reports the following:   General: denies fever, chills, sweats, fatigue, recent changes in weight; endorses change in appetite following removal of his wisdom teeth, 2/26/2019  Integument: endorses bilateral lower extremity redness; denies sores, lumps/bumps not otherwise noted above  Respiratory: denies cough, sputum production, hemoptysis, dyspnea, and wheezing  Cardiac: denies chest pain, SOB, palpitations  GI: denies abdominal pain, nausea, vomiting; endorses diarrhea for one week  Peripheral Vascular: denies history of DVT, peripheral edema  Musculoskeletal:  denies joint pain, swollen or stiff joints not otherwise noted above  Neurological: denies fainting, blackouts; endorses generalized weakness; denies focal weakness or paralysis; endorses baseline numbness of feet; endorses tremors   Hematological: denies blood abnormalities other than those noted above - chronically anticoagulated    History obtained from patient interview and chart review. All relevant notes were reviewed and HPI/pertinent ROS were updated to reflect their current presentation and hospital course.       PAST MEDICAL HISTORY:   Past Medical History:   Diagnosis Date     Atrial fibrillation (H)      Basal cell carcinoma      Chronic anticoagulation      Diastolic heart failure (H)      Dyslipidemia      Essential hypertension      Morbid obesity (H)      Sleep-disordered breathing      Type 2 diabetes mellitus (H)        Patient denies any personal history of bleeding disorders, clotting disorders, or adverse reactions to anesthesia.      PAST SURGICAL HISTORY:   Past Surgical History:   Procedure Laterality Date     BIOPSY OF SKIN LESION       MOHS MICROGRAPHIC PROCEDURE       PICC INSERTION Right 09/24/2017    5fr TL Bard PICC, 51cm (1cm external) in the R medial brachial [place] w[place]  [facility] [place]         MEDICATIONS:   Prior to Admission medications    Medication Sig Last Dose Taking? Auth Provider   ammonium lactate (LAC-HYDRIN) 12 % cream Apply topically 2 times daily as needed for dry skin   Neville Moore MD   Ascorbic Acid (VITAMIN C PO)    Reported, Patient   aspirin 81 MG tablet Take 1 tablet (81 mg) by mouth daily   Ari Reyna MD   atorvastatin (LIPITOR) 40 MG tablet Take 1 tablet (40 mg) by mouth daily   Matthias Sanchez MD   blood glucose monitoring (ONE TOUCH DELICA) lancets Use to test blood sugars 2 times daily or as directed.   Matthias Sanchez MD   blood glucose monitoring (ONE TOUCH ULTRA MINI) meter device kit Use to test blood sugars 2 times daily or as directed.   Neville Moore MD   blood glucose monitoring (ONETOUCH ULTRA) test strip Use to test blood sugars 2 times daily or as directed.   Matthias Sanchez MD   levothyroxine (SYNTHROID/LEVOTHROID) 175 MCG tablet Take 1 tablet (175 mcg) by mouth daily   Matthias Sanchez MD   lisinopril (PRINIVIL/ZESTRIL) 5 MG tablet Take 1 tablet (5 mg) by mouth daily Take in Pm   Sybil Norman APRN CNP   metFORMIN (GLUCOPHAGE) 500 MG tablet Take 1 tablet (500 mg) by mouth 2 times daily (with meals)   Matthias Sanchez MD   metoprolol succinate ER (TOPROL-XL) 100 MG 24 hr tablet Take 0.5 tablets (50 mg) by mouth daily   Sybil Norman APRN CNP   multivitamin, therapeutic with minerals (MULTI-VITAMIN) TABS tablet Take 1 tablet by mouth daily   Matthias Sanchez MD   nystatin (MYCOSTATIN) cream Apply twice daily to affected area   Matthias Sanchez MD   omega 3 1000 MG CAPS Take 1 g by mouth daily   Matthias Sanchez MD   order for DME Equipment being ordered: Bariatric Lift chair  Diagnosis - morbid obesity, CHF, Lymphedema    Fax to Ne from [employer] at [phone].   Matthias Sanchez MD   order for DME Equipment being ordered: Other: Velcro Compression stockings.   Treatment Diagnosis: bilateral lymphedema.   Oswald Mcneal MD    polyethylene glycol (MIRALAX/GLYCOLAX) packet Take 17 g by mouth daily as needed for constipation   Matthias Sanchez MD   potassium chloride (KLOR-CON) 20 MEQ packet Take 40 mEq by mouth 2 times daily   Sybil Norman APRN CNP   senna-docusate (SENOKOT-S/PERICOLACE) 8.6-50 MG tablet Take 1-2 tablets by mouth 2 times daily as needed for constipation   Matthias Sanchez MD   spironolactone (ALDACTONE) 25 MG tablet Take 1 tablet (25 mg) by mouth daily   Matthias Sanchez MD   tamsulosin (FLOMAX) 0.4 MG capsule Take 1 capsule (0.4 mg) by mouth daily   Matthias Sanchez MD   torsemide (DEMADEX) 100 MG tablet Take 1.5 tablets (150 mg) by mouth 2 times daily   Sybil Norman APRN CNP   vitamin B complex with vitamin C (VITAMIN  B COMPLEX) TABS tablet Take 1 tablet by mouth daily   Reported, Patient   VITAMIN E COMPLEX PO    Reported, Patient   warfarin (COUMADIN) 5 MG tablet Take 2.5 tablets (12.5 mg) by mouth daily   Matthias Sanchez MD         ALLERGIES:   Patient has no known allergies.      SOCIAL HISTORY:   Living situation: Patient lives alone in an apartment. He has a home nurse visit on occasion to assist with wound cares. He has a history of alcohol use, but has been sober for over twenty years. He denies tobacco and drug use.     FAMILY HISTORY:  Patient denies known family history of bleeding, clotting, or anesthesia related complications.     REVIEW OF SYSTEMS:   A brief 10-point ROS was performed during the patient encounter. All pertinent items are included in the HPI. Other systems were negative, as below:    Head:  Endorses headache follow wisdom teeth removal on 2/26; endorses dizziness associated with malaise/fatigue  Eyes: denies new changes in vision, blurred vision, diplopia, excessive tearing  Ears: denies new hearing loss, pain  Nose: denies recent nasal congestion   Mouth: denies new oral sores, ulcers  Throat:  denies new pain, hoarseness, difficulty swallowing  Urinary: endorses polyuria;  denies new onset dysuria, hematuria, nocturia   Endocrine:  denies excessive sweating, polyuria, polydipsia, polyphagia   Psychiatric: endorses history of depression; denies sleep disturbances, substance abuse      PHYSICAL EXAM:   Vitals:    03/06/19 1200 03/06/19 1230 03/06/19 1300 03/06/19 1330   BP: 96/50 91/45 95/46 95/57   Pulse:       Resp: 11 8 17 18   Temp:       TempSrc:       SpO2: 93% 98% 98% 97%   Weight:         General: Awake, alert, appropriate, following commands, NAD  Neuro: CN II-XII grossly intact  Psych: Appropriate affect  Skin: bilateral lower extremity erythema extending from toes proximally to pretibial region. Digital wounds of great toe bilaterally as described below; no ulcers under panis   HEENT:  Normocephalic, atraumatic; EOMs grossly intact; external ear without erythema or edema bilaterally; no septal deviation  Lungs: Breathing comfortably and nonlabored, no wheezes or stridor noted  Heart/Cardiovascular: tachycardic, no peripheral cyanosis; 1+ pitting edema bilaterally  Abdomen: Soft, non-distended; left lateral flank pain  Musculoskeletal:   Upper Extremity:     No deformity, skin intact.     No significant tenderness to palpation over clavicle, AC joint, shoulder, arm, elbow, forearm, wrist.     Normal ROM of shoulder, elbow, and wrist, without pain    Motor intact distally throughout the radial, ulnar, and median nerve distributions as indicated by intact, 5/5 strength with thumbs up, finger crossing, and OK sign    Neurologic: SILT ax/m/r/u nerve distributions.     Vascular: Radial pulse palpable, 2+.   Right Lower Extremity:     Multiple areas of flaky epidermis with overlying onychocryptosis overlying the distal great toe and medial achilles; 15mm x 7mm area of black, dry eschar overlying distal great toe; no surrounding drainage; nails are yellow, thickened    No significant tenderness to palpation over thigh, knee, leg, ankle/foot.     Normal ROM of hip, knee, and ankle,  without tenderness     Motor intact distally throughout the tibial and peroneal nerve distributions as indicated by intact 5/5 strength with TA/GSC/EHL/FHL firing    Monofilament exam 3/9    DP/PT pulses palpable but faint, 1+, toes warm  Left Lower Extremity:     Multiple areas of flaky epidermis with overlying onychocryptosis overlying the distal great toe; 9mm x 4mm area of black, dry eschar overlying distal great toe; no surrounding drainage; nails are yellow, thickened    No significant tenderness to palpation over thigh, knee, leg, ankle/foot.     Normal ROM of hip, knee, and ankle, without tenderness     Motor intact distally throughout the tibial and peroneal nerve distributions as indicated by intact 5/5 strength with TA/GSC/EHL/FHL firing    Monofilament test 4/9    DP/PT pulses palpable faint, 1+    LABS:  CBC  Hemoglobin   Date Value Ref Range Status   03/06/2019 14.8 13.3 - 17.7 g/dL Final     WBC   Date Value Ref Range Status   03/06/2019 11.9 (H) 4.0 - 11.0 10e9/L Final     Hematocrit   Date Value Ref Range Status   03/06/2019 45.1 40.0 - 53.0 % Final     Platelet Count   Date Value Ref Range Status   03/06/2019 284 150 - 450 10e9/L Final       CREATININE  Creatinine   Date Value Ref Range Status   03/06/2019 4.15 (H) 0.66 - 1.25 mg/dL Final       GLUCOSE  Glucose   Date Value Ref Range Status   03/06/2019 104 (H) 70 - 99 mg/dL Final       INR  INR   Date Value Ref Range Status   03/06/2019 3.92 (H) 0.86 - 1.14 Final     Comment:     Critical Value called to and read back by   Clarke Capellan ED 03/06/19 1334 by BN         INFLAMMATORY LABS  WBC 11.9; no CRP or ESR ordered by ED    IMAGING:  AP/lateral of left foot demonstrates osteolysis of distal lateral cortex of great toe distal phalanx; imaging of right foot demonstrates cortical irregularity of distal great toe.      ASSESSMENT AND PLAN:    Clarke Moreira is a 68 year old male who presents with osteolysis on imaging that may be suggestive of  bilateral great toe osteomyelitis in the setting of 2/4 SIRS criteria (sepsis), elevated lactate, and supratherapeutic INR. Recent HgbA1c 6.1. No CRP or ESR obtained at time of consult.    Activity: WBAT with protective footwear, however this should be limited to transfers and PT exercises. Otherwise, the patient should focus efforts on elevating lower extremities to the level of the heart. ROM as tolerated.   Wound Cares/Dressing/Splint: Daily dressing changes by [facility] and Home nursing. Keep wound clean/dry.  DVT PPx: Per primary. Currently on warfarin, INR 3. 92  Antibiotic: Recommend broad spectrum IV antibiotics for now   Cultures: None  Labs: recommend CRP and ESR to assess for acute osteomyelitis; recent HgA1c. Consider obtaining ABIs to assess vascular status  Imaging: Obtain dedicated right and left foot 3 views imaging. Recommend against advanced imaging at this time.  PT/OT: Work on ROM, strengthening, and ADLs  Consults: Will consult podiatry for further wound care recommendations while patient is admitted. No further orthopedic interventions at this time  Dispo: Per Primary team  Follow Up: Patient to follow up with Dr. Lagos following discharge for further wound cares.    No surgical intervention indicated at this time.    Assessment and Plan discussed with Clarke Vora, PGY4, Orthopaedic Surgery. The patient will also be discussed with podiatry.      **UPDATE 03/06/2019 at 2036**  CRP and ESR have been resulted, 5.9 and 19, respectively. Given that these values are within normal limits, diagnosis of acute osteomyelitis is less likely despite some radiographic suggestion. Etiology of sepsis favors other diagnoses. Continue with above recommendations. Transition from IV to oral antibiotics per primary team.        Jason Perez PA-C  Orthopaedic Surgery  Pager: [phone]     Thank you for allowing me to participate in this patient's care. Please page me at [phone] with any questions/concerns. If  there is no response, if it is a weekend, or if it is during evening hours, please page the orthopaedic surgery resident on call.

## 2019-03-06 NOTE — ED NOTES
.Initial Assessment: VSS. Hypotension noted. Communicates needs without difficulty. Pleasant and co-op. Denies SOB. Denies chest/shoulder/arm pain or discomfort. No acute distress noted. MD at bedside. Labs drawn and sent. Pt report no visual disturbances at this time and adds that they typically go away when he sits down or lays flat.

## 2019-03-06 NOTE — TELEPHONE ENCOUNTER
RN took direct transfer. Patient has had vertigo, vision changes and bilateral arm/shoulder/neck/back weakness ongoing for a while- however it has significantly worsened in the last few days to the point that today he states he can hardly hold his neck up. He states his vision has also significantly worsened. He is getting the beginning of a headache.    RN recommended emergency evaluation as soon as possible with the symptoms he is displaying- significant changes from past. He is calling for a ride now.  Kyra Jackson RN

## 2019-03-06 NOTE — ED TRIAGE NOTES
"Pt BIBA for hypotension and visual disturbances x 2 days. Pt complains of dizziness upon standing and \"hallucinagenic\" visual disturbances and white spots. Pt reports feeling generally weak and unwell.    Pt was hypotensive for EMS- BP 60/40. Pt asx lying down. EMS started 20g IV in rt wrist and gave pt 200ml of NS. EMS     Pt had a tooth extraction 4 days ago and sx started 2 days later. Pt is on BP meds for hypertension and afib but normally runs around 100/60.   "

## 2019-03-07 ENCOUNTER — APPOINTMENT (OUTPATIENT)
Dept: PHYSICAL THERAPY | Facility: CLINIC | Age: 69
DRG: 856 | End: 2019-03-07
Payer: COMMERCIAL

## 2019-03-07 ENCOUNTER — APPOINTMENT (OUTPATIENT)
Dept: CARDIOLOGY | Facility: CLINIC | Age: 69
DRG: 856 | End: 2019-03-07
Payer: COMMERCIAL

## 2019-03-07 ENCOUNTER — APPOINTMENT (OUTPATIENT)
Dept: ULTRASOUND IMAGING | Facility: CLINIC | Age: 69
DRG: 856 | End: 2019-03-07
Payer: COMMERCIAL

## 2019-03-07 LAB
ANION GAP SERPL CALCULATED.3IONS-SCNC: 12 MMOL/L (ref 3–14)
ANION GAP SERPL CALCULATED.3IONS-SCNC: 12 MMOL/L (ref 3–14)
BUN SERPL-MCNC: 58 MG/DL (ref 7–30)
BUN SERPL-MCNC: 63 MG/DL (ref 7–30)
CALCIUM SERPL-MCNC: 8.4 MG/DL (ref 8.5–10.1)
CALCIUM SERPL-MCNC: 8.7 MG/DL (ref 8.5–10.1)
CHLORIDE SERPL-SCNC: 105 MMOL/L (ref 94–109)
CHLORIDE SERPL-SCNC: 106 MMOL/L (ref 94–109)
CO2 SERPL-SCNC: 20 MMOL/L (ref 20–32)
CO2 SERPL-SCNC: 25 MMOL/L (ref 20–32)
CREAT SERPL-MCNC: 3.5 MG/DL (ref 0.66–1.25)
CREAT SERPL-MCNC: 3.84 MG/DL (ref 0.66–1.25)
ERYTHROCYTE [DISTWIDTH] IN BLOOD BY AUTOMATED COUNT: 13.2 % (ref 10–15)
ERYTHROCYTE [DISTWIDTH] IN BLOOD BY AUTOMATED COUNT: 13.2 % (ref 10–15)
GFR SERPL CREATININE-BSD FRML MDRD: 15 ML/MIN/{1.73_M2}
GFR SERPL CREATININE-BSD FRML MDRD: 17 ML/MIN/{1.73_M2}
GLUCOSE BLDC GLUCOMTR-MCNC: 117 MG/DL (ref 70–99)
GLUCOSE BLDC GLUCOMTR-MCNC: 90 MG/DL (ref 70–99)
GLUCOSE BLDC GLUCOMTR-MCNC: 90 MG/DL (ref 70–99)
GLUCOSE SERPL-MCNC: 129 MG/DL (ref 70–99)
GLUCOSE SERPL-MCNC: 92 MG/DL (ref 70–99)
HCT VFR BLD AUTO: 42 % (ref 40–53)
HCT VFR BLD AUTO: 43.4 % (ref 40–53)
HGB BLD-MCNC: 13.2 G/DL (ref 13.3–17.7)
HGB BLD-MCNC: 13.9 G/DL (ref 13.3–17.7)
INR PPP: 4.39 (ref 0.86–1.14)
LACTATE BLD-SCNC: 0.9 MMOL/L (ref 0.7–2)
MAGNESIUM SERPL-MCNC: 1.7 MG/DL (ref 1.6–2.3)
MAGNESIUM SERPL-MCNC: 1.9 MG/DL (ref 1.6–2.3)
MCH RBC QN AUTO: 30.9 PG (ref 26.5–33)
MCH RBC QN AUTO: 31.1 PG (ref 26.5–33)
MCHC RBC AUTO-ENTMCNC: 31.4 G/DL (ref 31.5–36.5)
MCHC RBC AUTO-ENTMCNC: 32 G/DL (ref 31.5–36.5)
MCV RBC AUTO: 96 FL (ref 78–100)
MCV RBC AUTO: 99 FL (ref 78–100)
MRSA DNA SPEC QL NAA+PROBE: NEGATIVE
PLATELET # BLD AUTO: 212 10E9/L (ref 150–450)
PLATELET # BLD AUTO: 231 10E9/L (ref 150–450)
POTASSIUM SERPL-SCNC: 4.2 MMOL/L (ref 3.4–5.3)
POTASSIUM SERPL-SCNC: 4.8 MMOL/L (ref 3.4–5.3)
RBC # BLD AUTO: 4.25 10E12/L (ref 4.4–5.9)
RBC # BLD AUTO: 4.5 10E12/L (ref 4.4–5.9)
SODIUM SERPL-SCNC: 138 MMOL/L (ref 133–144)
SODIUM SERPL-SCNC: 141 MMOL/L (ref 133–144)
SPECIMEN SOURCE: NORMAL
VANCOMYCIN SERPL-MCNC: 19.3 MG/L
WBC # BLD AUTO: 10 10E9/L (ref 4–11)
WBC # BLD AUTO: 11.4 10E9/L (ref 4–11)

## 2019-03-07 PROCEDURE — 12000004 ZZH R&B IMCU UMMC

## 2019-03-07 PROCEDURE — 0JBQ0ZZ EXCISION OF RIGHT FOOT SUBCUTANEOUS TISSUE AND FASCIA, OPEN APPROACH: ICD-10-PCS | Performed by: PODIATRIST

## 2019-03-07 PROCEDURE — 87640 STAPH A DNA AMP PROBE: CPT

## 2019-03-07 PROCEDURE — 83735 ASSAY OF MAGNESIUM: CPT | Performed by: STUDENT IN AN ORGANIZED HEALTH CARE EDUCATION/TRAINING PROGRAM

## 2019-03-07 PROCEDURE — 83605 ASSAY OF LACTIC ACID: CPT | Performed by: PHYSICIAN ASSISTANT

## 2019-03-07 PROCEDURE — 36415 COLL VENOUS BLD VENIPUNCTURE: CPT | Performed by: INTERNAL MEDICINE

## 2019-03-07 PROCEDURE — 93306 TTE W/DOPPLER COMPLETE: CPT | Mod: 26 | Performed by: INTERNAL MEDICINE

## 2019-03-07 PROCEDURE — 00000146 ZZHCL STATISTIC GLUCOSE BY METER IP

## 2019-03-07 PROCEDURE — 87641 MR-STAPH DNA AMP PROBE: CPT

## 2019-03-07 PROCEDURE — 85027 COMPLETE CBC AUTOMATED: CPT | Performed by: STUDENT IN AN ORGANIZED HEALTH CARE EDUCATION/TRAINING PROGRAM

## 2019-03-07 PROCEDURE — 25000128 H RX IP 250 OP 636: Performed by: STUDENT IN AN ORGANIZED HEALTH CARE EDUCATION/TRAINING PROGRAM

## 2019-03-07 PROCEDURE — 25500064 ZZH RX 255 OP 636: Performed by: INTERNAL MEDICINE

## 2019-03-07 PROCEDURE — 40000264 ECHOCARDIOGRAM COMPLETE

## 2019-03-07 PROCEDURE — 97110 THERAPEUTIC EXERCISES: CPT | Mod: GP

## 2019-03-07 PROCEDURE — 0HBRXZZ EXCISION OF TOE NAIL, EXTERNAL APPROACH: ICD-10-PCS | Performed by: PODIATRIST

## 2019-03-07 PROCEDURE — 97530 THERAPEUTIC ACTIVITIES: CPT | Mod: GP

## 2019-03-07 PROCEDURE — 97161 PT EVAL LOW COMPLEX 20 MIN: CPT | Mod: GP

## 2019-03-07 PROCEDURE — 85610 PROTHROMBIN TIME: CPT | Performed by: STUDENT IN AN ORGANIZED HEALTH CARE EDUCATION/TRAINING PROGRAM

## 2019-03-07 PROCEDURE — 0HBMXZZ EXCISION OF RIGHT FOOT SKIN, EXTERNAL APPROACH: ICD-10-PCS | Performed by: PODIATRIST

## 2019-03-07 PROCEDURE — 25000128 H RX IP 250 OP 636: Performed by: INTERNAL MEDICINE

## 2019-03-07 PROCEDURE — 76770 US EXAM ABDO BACK WALL COMP: CPT

## 2019-03-07 PROCEDURE — 80048 BASIC METABOLIC PNL TOTAL CA: CPT | Performed by: STUDENT IN AN ORGANIZED HEALTH CARE EDUCATION/TRAINING PROGRAM

## 2019-03-07 PROCEDURE — 25800030 ZZH RX IP 258 OP 636: Performed by: INTERNAL MEDICINE

## 2019-03-07 PROCEDURE — 25800030 ZZH RX IP 258 OP 636

## 2019-03-07 PROCEDURE — 36415 COLL VENOUS BLD VENIPUNCTURE: CPT | Performed by: STUDENT IN AN ORGANIZED HEALTH CARE EDUCATION/TRAINING PROGRAM

## 2019-03-07 PROCEDURE — 36415 COLL VENOUS BLD VENIPUNCTURE: CPT | Performed by: PHYSICIAN ASSISTANT

## 2019-03-07 PROCEDURE — 25000132 ZZH RX MED GY IP 250 OP 250 PS 637: Performed by: STUDENT IN AN ORGANIZED HEALTH CARE EDUCATION/TRAINING PROGRAM

## 2019-03-07 PROCEDURE — 80202 ASSAY OF VANCOMYCIN: CPT | Performed by: INTERNAL MEDICINE

## 2019-03-07 RX ORDER — PIPERACILLIN SODIUM, TAZOBACTAM SODIUM 3; .375 G/15ML; G/15ML
3.38 INJECTION, POWDER, LYOPHILIZED, FOR SOLUTION INTRAVENOUS EVERY 6 HOURS
Status: DISCONTINUED | OUTPATIENT
Start: 2019-03-07 | End: 2019-03-08

## 2019-03-07 RX ADMIN — SODIUM CHLORIDE, POTASSIUM CHLORIDE, SODIUM LACTATE AND CALCIUM CHLORIDE 500 ML: 600; 310; 30; 20 INJECTION, SOLUTION INTRAVENOUS at 13:48

## 2019-03-07 RX ADMIN — NYSTATIN: 100000 CREAM TOPICAL at 08:06

## 2019-03-07 RX ADMIN — HUMAN ALBUMIN MICROSPHERES AND PERFLUTREN 6 ML: 10; .22 INJECTION, SOLUTION INTRAVENOUS at 11:00

## 2019-03-07 RX ADMIN — PIPERACILLIN SODIUM AND TAZOBACTAM SODIUM 3.38 G: 3; .375 INJECTION, POWDER, LYOPHILIZED, FOR SOLUTION INTRAVENOUS at 00:49

## 2019-03-07 RX ADMIN — PIPERACILLIN SODIUM AND TAZOBACTAM SODIUM 3.38 G: 3; .375 INJECTION, POWDER, LYOPHILIZED, FOR SOLUTION INTRAVENOUS at 11:48

## 2019-03-07 RX ADMIN — CHLORHEXIDINE GLUCONATE 15 ML: 1.2 RINSE ORAL at 20:15

## 2019-03-07 RX ADMIN — ATORVASTATIN CALCIUM 40 MG: 40 TABLET, FILM COATED ORAL at 00:49

## 2019-03-07 RX ADMIN — SODIUM CHLORIDE 500 ML: 9 INJECTION, SOLUTION INTRAVENOUS at 00:49

## 2019-03-07 RX ADMIN — ASPIRIN 81 MG: 81 TABLET, COATED ORAL at 08:06

## 2019-03-07 RX ADMIN — LEVOTHYROXINE SODIUM 175 MCG: 25 TABLET ORAL at 08:06

## 2019-03-07 RX ADMIN — VANCOMYCIN HYDROCHLORIDE 2500 MG: 10 INJECTION, POWDER, LYOPHILIZED, FOR SOLUTION INTRAVENOUS at 13:36

## 2019-03-07 RX ADMIN — PIPERACILLIN SODIUM AND TAZOBACTAM SODIUM 3.38 G: 3; .375 INJECTION, POWDER, LYOPHILIZED, FOR SOLUTION INTRAVENOUS at 18:33

## 2019-03-07 RX ADMIN — CHLORHEXIDINE GLUCONATE 15 ML: 1.2 RINSE ORAL at 08:07

## 2019-03-07 RX ADMIN — PIPERACILLIN SODIUM AND TAZOBACTAM SODIUM 3.38 G: 3; .375 INJECTION, POWDER, LYOPHILIZED, FOR SOLUTION INTRAVENOUS at 06:35

## 2019-03-07 ASSESSMENT — ACTIVITIES OF DAILY LIVING (ADL)
ADLS_ACUITY_SCORE: 19
ADLS_ACUITY_SCORE: 20
ADLS_ACUITY_SCORE: 20
ADLS_ACUITY_SCORE: 19
ADLS_ACUITY_SCORE: 20
ADLS_ACUITY_SCORE: 19

## 2019-03-07 ASSESSMENT — MIFFLIN-ST. JEOR: SCORE: 2481.13

## 2019-03-07 NOTE — PLAN OF CARE
Pt A&O X4, VSS, afebrile, HR Afib 's, BP's 100's/40-50's (MAPs > 60), O2 sats > 90% on 2L via NC. LS clear/diminished, BS+ X4, baseline CMS intact (pt has neuropathy). Slept majority of overnight shift, had few requests/complaints, pleasant & cooperative, denied pain. PIV in R upper arm patent & infusing @ TKO between antibiotics, PIV in R lower arm patent & SL. Generalized bruising noted throughout, diffuse red rash to perineal area. Bilateral great toes have black spots, redness noted to toes. Tolerating moderate CHO/2G Na diet with no nausea/emesis. Voiding urine adequately via urinal, passing gas but no BM. Has call light within reach, able to make needs known, will continue to monitor per POC.

## 2019-03-07 NOTE — CONSULTS
Oral and Maxillofacial Surgery Consult PGY 3  Clarke Moreira [id number]   68 year old male 1950     Requested by Internal medicineNakul Sifuentse  Reason for Consultation:  Facial cellulitis    ASSESSMENT:   Clarke Moreira is a 68 year old male with PMH of afib on warfarin, HFpEF, T2DM, and recent extraction #15 and 16 (2/26/19) who is admitted on 3/6/2019 with severe sepsis and MICHELLE. Noted to have no acute infection in sites #15 and 16 and extraction sites noted to be in transitional healing consistent with the post op day.    PLAN:   ---#15 and 16 are recent sites of extraction, appear to be in transitional healing. No purulence noted. Extraction site noted with fibrinous protein membrane. Sutures in situ. At this time no sign of infection noted.   ---Recommended sinus precautions for 6 weeks  ---Oral antibiotics- Augmentin 875/125mg - 7 days.  ---Follow up with [facility] for further care. Discussed the plan with [facility] resident. [facility] will see him for follow up and schedule OMFS consult as needed.  ---Chlorhexidine BID- 10 days.  ---Saline nasal spray as needed.  ---Please contact OMFS as needed. Appreciate cares.    This patient has been discussed with Dr Sinha chief resident, who agrees with the assessment and plan, who will discuss with attending Dr. Vargas.  ________________________________________________________________    CHIEF COMPLAINT:    Chief Complaint   Patient presents with     Dizziness     Hypotension        HISTORY OF PRESENT ILLNESS:   Patient was seen in the dental clinic for extraction of #15,16 on 2/26/2019. Patient was seen for a follow up in dental clinic and a socket dressing was placed. Patient was admitted on 3/6/2019 with severe sepsis and MICHELLE. He in clinically stable, lactic acidosis improved following fluids. He was found to have bilateral great toe osteomyelitis as well as left maxillary sinusitis. Denies any constitutional symptoms at this time. Denies pain in the face, reports of  minimal pain in the extraction socket. Denies any water leak into the nose.      PAST MEDICAL HISTORY:    Past Medical History:   Diagnosis Date     Atrial fibrillation (H)      Basal cell carcinoma      Chronic anticoagulation      Diastolic heart failure (H)      Dyslipidemia      Essential hypertension      Morbid obesity (H)      Sleep-disordered breathing      Type 2 diabetes mellitus (H)        PAST SURGICAL HISTORY:    Past Surgical History:   Procedure Laterality Date     BIOPSY OF SKIN LESION       MOHS MICROGRAPHIC PROCEDURE       PICC INSERTION Right 09/24/2017    5fr TL Bard PICC, 51cm (1cm external) in the R medial brachial vein w/ tip in the SVC.        MEDICATIONS:      Current Facility-Administered Medications:      aspirin EC tablet 81 mg, 81 mg, Oral, Daily, Vipul Miles MD, 81 mg at 03/07/19 0806     atorvastatin (LIPITOR) tablet 40 mg, 40 mg, Oral, At Bedtime, Vipul Miles MD, 40 mg at 03/07/19 0049     chlorhexidine (PERIDEX) 0.12 % solution 15 mL, 15 mL, Swish & Spit, BID, Kaitlin Lowe MD, 15 mL at 03/07/19 0807     levothyroxine (SYNTHROID/LEVOTHROID) tablet 175 mcg, 175 mcg, Oral, QAM AC, Vipul Miles MD, 175 mcg at 03/07/19 0806     naloxone (NARCAN) injection 0.1-0.4 mg, 0.1-0.4 mg, Intravenous, Q2 Min PRN, Vipul Miles MD     nystatin (MYCOSTATIN) cream, , Topical, BID PRN, Vipul Miles MD     ondansetron (ZOFRAN-ODT) ODT tab 4 mg, 4 mg, Oral, Q6H PRN **OR** ondansetron (ZOFRAN) injection 4 mg, 4 mg, Intravenous, Q6H PRN, Vipul Miles MD     Patient is already receiving anticoagulation with heparin, enoxaparin (LOVENOX), warfarin (COUMADIN)  or other anticoagulant medication, , Does not apply, Continuous PRN, Vipul Miles MD     piperacillin-tazobactam (ZOSYN) 3.375 g vial to attach to  mL bag, 3.375 g, Intravenous, Q6H, Kaitlin Lowe MD, 3.375 g at 03/07/19 1148     vancomycin place beyer - receiving  intermittent dosing, 1 each, Intravenous, See Admin Instructions, Mary Jimenez RPH     ALLERGIES:     No Known Allergies    FAMILY HISTORY:  Family History   Problem Relation Age of Onset     Depression Mother      Glaucoma Maternal Grandfather      Cancer No family hx of         No family history of skin cancer     Macular Degeneration No family hx of        SOCIAL HISTORY:    Social History     Socioeconomic History     Marital status: Single     Spouse name: None     Number of children: None     Years of education: None     Highest education level: None   Occupational History     None   Social Needs     Financial resource strain: None     Food insecurity:     Worry: None     Inability: None     Transportation needs:     Medical: None     Non-medical: None   Tobacco Use     Smoking status: Never Smoker     Smokeless tobacco: Never Used   Substance and Sexual Activity     Alcohol use: No     Comment: Former alcohol abuse. Quit 70s then quit later in 80s-present     Drug use: No     Comment: history of LSD use     Sexual activity: None   Lifestyle     Physical activity:     Days per week: None     Minutes per session: None     Stress: None   Relationships     Social connections:     Talks on phone: None     Gets together: None     Attends Worship service: None     Active member of club or organization: None     Attends meetings of clubs or organizations: None     Relationship status: None     Intimate partner violence:     Fear of current or ex partner: None     Emotionally abused: None     Physically abused: None     Forced sexual activity: None   Other Topics Concern     Parent/sibling w/ CABG, MI or angioplasty before 65F 55M? Not Asked   Social History Narrative    Lives in an apartment in 16th floor near hospital.  Has elevators, unable to use stairs. Not able to shop; has things delivered to him including food. Difficulty completing cleaning. Goes to PCP once yearly for annual check up and medication  "review.       REVIEW OF SYSTEMS:   A 10 pt review of systems was negative beyond what is included in the History of Present Illness.  Vital signs:  Temp: 98.6  F (37  C) Temp src: Axillary BP: 90/43(MAP 61) Pulse: 97 Heart Rate: 89 Resp: 22 SpO2: 99 % O2 Device: Nasal cannula Oxygen Delivery: 2 LPM Height: 180.3 cm (5' 11\") Weight: (!) 168.9 kg (372 lb 5.7 oz)  Estimated body mass index is 51.93 kg/m  as calculated from the following:    Height as of this encounter: 1.803 m (5' 11\").    Weight as of this encounter: 168.9 kg (372 lb 5.7 oz).    EXAMINATION:      Constitutional: alert, oriented, no distress, normal affect, well developed, well nourished  CV:-RRR  Pulm: Symmetrical chest expansion, maintaining saturation in room air.  Skin: no rashes/lesions noted.  Neuro: Cranial Nerves II-XII intact  Neck: No  CLAD noted, Trachea midline  HEENT: Normocephalic, No  facial swelling      Ears: External ears are normal      Eyes: Pupils PERRLA, Extraocular eye movement intact      Nose:   External alae are normal, there is no hemorrhage.  ORAL:  Oral mucosa moist, No gingival lesions, Lips intact with commissure intact, No ulceration/masses on tongue, No vestibular swelling, no masses/asymmetry of hard/soft palate, uvula WNL. Extraction site #15,16 with protein membrane, no purulent discharge noted. Sutures are in situ. Gingiva is pink and healthy and no edema noted. No direct sinus communication noted at this time.  Occlusion: Stable, Salivary glands: symmetric/no masses/obstruction  TMJ: Function is smooth and even, No open/closed lock, No MOM tenderness  BHAVYA:45 mm      A CT scan of the head/facial bones was reviewed. 3/6/2019:    Postsurgical changes of left superior wisdom tooth extraction. There  is adjacent soft tissue thickening along the alveolar ridge, without  focal fluid collection. There is a bony defect along the floor of the  maxillary sinus at the site of wisdom tooth extraction, measuring 7 x  6 mm " (transverse by AP)). Complex fluid is seen within the left  maxillary sinus, with areas of hyperdensity. There is an air-fluid  level within the superior aspect of the left maxillary sinus. The left  ostiomeatal unit appears occluded. The remainder of the visualized  paranasal sinuses are relatively clear.     The mucosal spaces are grossly unremarkable on this noncontrast  examination. The major salivary glands are unremarkable in appearance.  The left lobe of the thyroid is diminutive in appearance, with foci of  internal calcifications, similar in appearance to chest CT dated  2/28/2008.     No bulky cervical lymphadenopathy. Moderate atherosclerotic  calcifications about the left carotid bifurcation. Noncontrast  evaluation of the vessels of the neck is otherwise unremarkable.                                                                      Impression:  1. Findings concerning for cellulitis overlying the site left  maxillary wisdom to extraction. No focal fluid collection/abscess.  2. Findings compatible with odontogenic sinusitis, with complex and/or  hemorrhagic debris within the left maxillary sinus.    Vianca Kay DMD  Oral and Maxillofacial Surgery PGY-3

## 2019-03-07 NOTE — CONSULTS
"Dental Service Consultation        Clarke Moreira MRN# [id number]   YOB: 1950 Age: 68 year old   Date of Admission: 3/6/2019     Reason for consult: I was asked by Dr. Faith to evaluate this patient for cellulitis post extractions of the UL.           Assessment and Plan:   Assessment: Patient has minor cellulitis based on imaging. Socket still has gel foam and eugenol packing that was placed in clinic and should be kept in socket. Socket is healing within normal limits for the number of days post extraction- some slight swelling and redness around the socket is to be expected. Intra-orally there are no signs of acute infection. Pt has trismus of the L TMJ which is to be expected after a long dental procedure.      Plan: Continue to monitor patients facial swelling/redness. Recommend for patient to use Afrin Nasal Spray and administer Augmentin antibiotics via IV.  Recommend to keep the packing in extraction site. Patient already has follow up appointments set up at [facility] next week.              Chief Complaint:   \" I have had many issues come up after the extractions but I wasn't feeling well and had many other stressful events that occurred\"   History is obtained from the patient         History of Present Illness:   This patient is a 68 year old male who presents to ED  With several infections              Past Medical History:     Past Medical History:   Diagnosis Date     Atrial fibrillation (H)      Basal cell carcinoma      Chronic anticoagulation      Diastolic heart failure (H)      Dyslipidemia      Essential hypertension      Morbid obesity (H)      Sleep-disordered breathing      Type 2 diabetes mellitus (H)              Past Surgical History:     Past Surgical History:   Procedure Laterality Date     BIOPSY OF SKIN LESION       MOHS MICROGRAPHIC PROCEDURE       PICC INSERTION Right 09/24/2017    5fr TL Bard PICC, 51cm (1cm external) in the R medial brachial vein w/ tip in the " [facility].               Social History:     Social History     Tobacco Use     Smoking status: Never Smoker     Smokeless tobacco: Never Used   Substance Use Topics     Alcohol use: No     Comment: Former alcohol abuse. Quit 70s then quit later in 80s-present             Family History:     Family History   Problem Relation Age of Onset     Depression Mother      Glaucoma Maternal Grandfather      Cancer No family hx of         No family history of skin cancer     Macular Degeneration No family hx of              Immunizations:     Immunization History   Administered Date(s) Administered     Influenza (IIV3) PF 12/15/2005, 03/08/2007, 10/01/2010, 12/02/2011, 09/06/2017     Mantoux Tuberculin Skin Test 10/09/2017     Pneumo Conj 13-V (2010&after) 11/27/2017     TDAP Vaccine (Boostrix) 11/27/2017     Tdap (Adacel,Boostrix) 03/08/2007             Allergies:   No Known Allergies          Medications:     Current Facility-Administered Medications Ordered in Epic   Medication Dose Route Frequency Last Rate Last Dose     aspirin EC tablet 81 mg  81 mg Oral Daily   81 mg at 03/07/19 0806     atorvastatin (LIPITOR) tablet 40 mg  40 mg Oral At Bedtime   40 mg at 03/07/19 0049     chlorhexidine (PERIDEX) 0.12 % solution 15 mL  15 mL Swish & Spit BID   15 mL at 03/07/19 0807     levothyroxine (SYNTHROID/LEVOTHROID) tablet 175 mcg  175 mcg Oral QAM AC   175 mcg at 03/07/19 0806     naloxone (NARCAN) injection 0.1-0.4 mg  0.1-0.4 mg Intravenous Q2 Min PRN         nystatin (MYCOSTATIN) cream   Topical BID PRN         ondansetron (ZOFRAN-ODT) ODT tab 4 mg  4 mg Oral Q6H PRN        Or     ondansetron (ZOFRAN) injection 4 mg  4 mg Intravenous Q6H PRN         Patient is already receiving anticoagulation with heparin, enoxaparin (LOVENOX), warfarin (COUMADIN)  or other anticoagulant medication   Does not apply Continuous PRN         piperacillin-tazobactam (ZOSYN) 3.375 g vial to attach to  mL bag  3.375 g Intravenous Q6H   3.375 g at  03/07/19 1148     sodium chloride (PF) 0.9% PF flush 10 mL  10 mL Intravenous Once         vancomycin place beyer - receiving intermittent dosing  1 each Intravenous See Admin Instructions         No current Epic-ordered outpatient medications on file.             Review of Systems:   The 10 point Review of Systems is negative other than noted in the HPI            Physical Exam:   Vitals were reviewed  Temp: 98.1  F (36.7  C) Temp src: Oral BP: 94/64 Pulse: 97 Heart Rate: 112 Resp: 20 SpO2: 100 % O2 Device: Nasal cannula Oxygen Delivery: 2 LPM    Head and neck exam:  Bilaterally symmetrical- no swelling, Slight pain on palpation near TMJ , no redness, no lymphadenopathy, pt is able to have full range of motion on opening and closing, no masses,       Intraoral exam of the UL:   Slight  swelling, slight redness, slight pain on palpation , no purulence, packing still present          Data:   Radiographic interpretation: Dental CT taken on 3/6/2019  Osseous pathology: none   Pulpal Pathology: none   Periodontal Pathology: none   Caries: none   Odontogenic pathology: none   UL Sinusitis     The patient was discussed with: Dr. Faith and Dental Attending Dr. Lucille Davis DMD   PGY1  Pager: [phone]

## 2019-03-07 NOTE — PROGRESS NOTES
[facility]  Patient is currently open to home care services with [facility].  The patient is currently receiving RN services.  [facility]  and team have been notified of patient admission.  [facility] liaison will continue to follow patient during stay.  If appropriate provide orders to resume home care at time of discharge.    Thank you  Belkis Solano RN, BSN  [facility] Liaison  [facility]  [phone]

## 2019-03-07 NOTE — PLAN OF CARE
Discharge Planner PT 6B  Patient plan for discharge: long term goal to return home  Current status: pt encountered in supine; RN ok's session, completes supine > sit with SBA, does not endorse dizziness however BP remains low, HR increases to 120s-130s while sitting EOB due to underlying Afib, attempted sit <> stand transfer however pt unable to clear hips off of bed with Max A x 1; HR fluctuates 120s-160s while attempting transfers; session terminated 2/2 HR; RN present throughout session. Max A needed to return to supine and A x 2 to boost for positioning. Upon returning to supine HR returns to < 100 bpm.    Barriers to return to prior living situation: medical status, lives alone, mobility status and weakness  Recommendations for discharge: TCU  Rationale for recommendations: to improve functional strength, mobility and activity tolerance.        Entered by: Luis Kilpatrick 03/07/2019 12:49 PM

## 2019-03-07 NOTE — PROGRESS NOTES
Care Coordinator Progress Note    Admission Date/Time:  3/6/2019  Attending MD:  Cathleen Kennedy MD    Data  Chart reviewed, discussed with interdisciplinary team.   Patient was admitted for:    Sepsis (H)  Other osteomyelitis, multiple sites (H)  Acute maxillary sinusitis, recurrence not specified  MICHELLE (acute kidney injury) (H)  Septicemic (H)  Atrial fibrillation, unspecified type (H).    Concerns with insurance coverage for discharge needs: None.  Current Living Situation: Patient lives alone.  Support System: Involved  Services Involved: DME and Home Care  Transportation at Discharge: Family or friend will provide  Transportation to Medical Appointments:   - Not applicable  Barriers to Discharge: medical stability    Coordination of Care and Referrals: Provided patient/family with options for Home Care.        Assessment  Patient admitted with sepsis and MICHELLE. History of afib, heart failure, DM, and recent dental work.   Met with patient at bedside, introduced self and RNCC role. Patient lives at home alone. Open to [facility] for RN services weekly; resumption order placed. On oxygen at home while sedentary supplied by [facility]. Denies need for portable tank at discharge. No further needs noted. Therapy to eval. If home therapies rec, patient prefers to use [facility] for that as well.     Plan  Anticipated Discharge Date:  TBD  Anticipated Discharge Plan:  Home with services    Lauren Massey RN

## 2019-03-07 NOTE — ED NOTES
[facility] [place]   ED Nurse to Floor Handoff     Clarke Moreira is a 68 year old male who speaks English and lives alone,  in a home  They arrived in the ED by ambulance from home    ED Chief Complaint: Dizziness and Hypotension    ED Dx;   Final diagnoses:   Sepsis (H)   Other osteomyelitis, multiple sites (H)   Acute maxillary sinusitis, recurrence not specified         Needed?: No    Allergies: No Known Allergies.  Past Medical Hx:   Past Medical History:   Diagnosis Date     Atrial fibrillation (H)      Basal cell carcinoma      Chronic anticoagulation      Diastolic heart failure (H)      Dyslipidemia      Essential hypertension      Morbid obesity (H)      Sleep-disordered breathing      Type 2 diabetes mellitus (H)       Baseline Mental status: WDL  Current Mental Status changes: at basesline    Infection present or suspected this encounter: cultures pending  Sepsis suspected: Yes  Isolation type: No active isolations     Activity level - Baseline/Home:  Independent  Activity Level - Current:   Stand with Assist    Bariatric equipment needed?: Yes    In the ED these meds were given:   Medications   vancomycin place beyer - receiving intermittent dosing (not administered)   norepinephrine (LEVOPHED) 16 mg in D5W 250 mL infusion (not administered)   0.9% sodium chloride BOLUS (0 mLs Intravenous Stopped 3/6/19 1303)   piperacillin-tazobactam (ZOSYN) 4.5 g vial to attach to  mL bag (0 g Intravenous Stopped 3/6/19 1600)   vancomycin (VANCOCIN) 2,500 mg in sodium chloride 0.9 % 500 mL intermittent infusion (0 mg Intravenous Stopped 3/6/19 1433)   metoclopramide (REGLAN) injection 10 mg (10 mg Intravenous Given 3/6/19 1259)   diphenhydrAMINE (BENADRYL) injection 25 mg (25 mg Intravenous Given 3/6/19 1615)       Drips running?  No    Home pump  No    Current LDAs  Peripheral IV 03/06/19 Right Lower forearm (Active)   Site Assessment WDL 3/6/2019 10:58 AM   Line  "Status Infusing 3/6/2019 10:58 AM   Phlebitis Scale 0-->no symptoms 3/6/2019 10:58 AM   Number of days: 0       Peripheral IV 03/06/19 Right Upper arm (Active)   Site Assessment WDL 3/6/2019 11:06 AM   Line Status Saline locked 3/6/2019 11:06 AM   Phlebitis Scale 0-->no symptoms 3/6/2019 11:06 AM   Number of days: 0       Right Radial Interventional Procedure Access (Active)   Number of days: 273       Pressure Injury 09/24/17 Right Heel (Active)   Number of days: 528       Wound 09/25/17 Anterior;Left Foot Ulceration pt states from \"rubbing against sandels\" (Active)   Number of days: 527       Wound 09/28/17 Bilateral;Lower Leg Other (comment) (Active)   Number of days: 524       Wound 01/07/18 Bilateral;Lower Leg blisters, cellulitis (Active)   Number of days: 423       Labs results:   Labs Ordered and Resulted from Time of ED Arrival Up to the Time of Departure from the ED   CBC WITH PLATELETS DIFFERENTIAL - Abnormal; Notable for the following components:       Result Value    WBC 11.9 (*)     All other components within normal limits   COMPREHENSIVE METABOLIC PANEL - Abnormal; Notable for the following components:    Glucose 104 (*)     Urea Nitrogen 64 (*)     Creatinine 4.15 (*)     GFR Estimate 14 (*)     GFR Estimate If Black 16 (*)     All other components within normal limits   NT PROBNP INPATIENT - Abnormal; Notable for the following components:    N-Terminal Pro BNP Inpatient 2,329 (*)     All other components within normal limits   INR - Abnormal; Notable for the following components:    INR 3.92 (*)     All other components within normal limits   ISTAT  GASES LACTATE MANDY POCT - Abnormal; Notable for the following components:    PCO2 Venous 39 (*)     Lactic Acid 2.8 (*)     All other components within normal limits   ROUTINE UA WITH MICROSCOPIC REFLEX TO CULTURE   LACTIC ACID WHOLE BLOOD   ERYTHROCYTE SEDIMENTATION RATE AUTO   CRP INFLAMMATION   CARDIAC CONTINUOUS MONITORING   PULSE OXIMETRY NURSING "   PERIPHERAL IV CATHETER   PERIPHERAL IV CATHETER   IV ACCESS   ISTAT TROPONIN NURSING POCT   ISTAT CG4 GASES LACTATE MANDY NURSING POCT   CARDIAC CONTINUOUS MONITORING   PULSE OXIMETRY NURSING   CARDIAC CONTINUOUS MONITORING   MEASURE URINE OUTPUT   PATIENT CARE ORDER   TROPONIN POCT   INFLUENZA A AND B AND RSV PCR   BLOOD CULTURE   BLOOD CULTURE       Imaging Studies:   Recent Results (from the past 24 hour(s))   POC US ECHO LIMITED    Impression    Limited Bedside Cardiac Ultrasound, performed and interpreted by me.   Indication: Hypotension/shock.  Parasternal long axis, parasternal short axis and subcostal views were acquired.   Image quality was satisfactory.    Findings:    Global left ventricular function appears intact.  Chambers do not appear dilated.  There is no evidence of free fluid within the pericardium.    IMPRESSION: Grossly normal left ventricular function and chamber size.  No pericardial effusion.   XR Chest Port 1 View    Narrative    EXAMINATION:  XR CHEST PORT 1 VW 3/6/2019 12:37 PM.    COMPARISON: Chest x-ray 1/5/2018. CT chest 9/23/2017.    HISTORY:  sob    FINDINGS: Single semiupright AP radiograph of the chest. The trachea  is midline. The cardiomediastinal silhouette is within normal limits.  Diffuse bilateral interstitial opacities, decreased when compared to  1/5/2018. Trace left pleural effusion. No pneumothorax. Bibasilar  airspace opacities. No focal pulmonary opacity. Visualized upper  abdomen and bones are unremarkable.      Impression    IMPRESSION:   1. No acute cardiopulmonary abnormality.  2. Pulmonary venous congestion, improved when compared to 1/5/2018.  3. Trace left pleural effusion and mild bibasilar atelectasis.    I have personally reviewed the examination and initial interpretation  and I agree with the findings.    SOL CRAIG MD   XR Toe Left 2 Views    Narrative    Exam: XR TOE LT G/E 2 VW, 3/6/2019 12:38 PM    Indication: osteo    Comparison: None    Findings:    AP, oblique and lateral views of the left first toe.    Soft tissue lesion noted over the distal first toe. 4 x 2 mm loose  bony body adjacent to the tibial side of the distal first phalanx,  with apparent donor site seen immediately adjacent. Irregularity noted  about the cortex of the tuft of the distal phalanx.      Impression    Impression:   1. Irregularity of the distal tuft of the distal phalanx indicating  osteomyelitis.  2. Loose bony body adjacent to the distal first phalanx, presumably  fractured from the immediately adjacent donor site, acute or subacute.    The preliminary report of no osteomyelitis was provided by Dr. Mosher.  After review by the staff radiologist, the final interpretation of  osteomyelitis was provided by Dr. Mosehr and communicated to Dr. Faith  at 1:35 PM on 3/6/2018.    I have personally reviewed the examination and initial interpretation  and I agree with the findings.    JUTTA ELLERMANN, MD   XR Toe Right 2 Views    Narrative    Exam: XR TOE RIGHT G/E 2 VIEWS, 3/6/2019 12:39 PM    Indication: osteo    Comparison: None    Findings:   AP and lateral views of the right first toe.    Irregularity of the tuft of the distal first phalanx. Soft tissue  lesion noted over the tip of the soft tissues of the first digit. No  fracture.      Impression    Impression:   Irregularity of the tuft of the distal first phalanx indicating  osteomyelitis.    The preliminary report of no osteomyelitis was provided by Dr. Mosher.  After review by the staff radiologist, the final interpretation of  osteomyelitis was provided by Dr. Mosher and communicated to Dr. Faith  at 1:35 PM on 3/6/2019.    I have personally reviewed the examination and initial interpretation  and I agree with the findings.    JUTTA ELLERMANN, MD   CT Head w/o Contrast   Result Value    Radiologist flags (Urgent)     Likely subacute or chronic infarcts in the posterior    Narrative    CT HEAD W/O CONTRAST 3/6/2019 12:51  PM    History: Ataxia, stroke suspected  ICD-10:  Comparison: None.    Technique: Using multidetector thin collimation helical acquisition  technique, axial, coronal and sagittal CT images from the skull base  to the vertex were obtained without intravenous contrast.     Findings:    On the noncontrast images of the brain, there is bilateral  hypoattenuation in the occipital lobes that does not likely represent  acute infarct may represent  subacute or chronic lesions without overt  cerebral atrophy. No overt hyperdense vasculature identified, and no  underlying intracranial hemorrhage. No mass effect or midline shift  associated with these lesions either. The ventricles are not enlarged.  The gray-white matter differentiation of the frontal lobes is  preserved and in the insula. The basal cisterns are patent.  Mostly obstructed left maxillary sinus with air-fluid level and debris  that is mildly hyperdense and may be complex. The mastoid air cells  are clear.       Impression    Impression:   1. Favor subacute age or chronic age bilateral posterior cerebral  artery distribution infarcts without associated mass effect or  underlying intracranial hemorrhage. MRI is more sensitive to age these  infarcts or to evaluate for acute ones.  2. Left maxillary sinusitis may be acute and may be complex or  obstructed.    [Access Center: Likely subacute or chronic infarcts in the posterior  cerebral artery distributions but MRI is more sensitive to evaluate.]    This report will be copied to the [facility] to ensure a  provider acknowledges the finding. Access Center is available Monday  through Friday 8am-3:30 pm.     LANCE ÁLVAREZ MD   CT Soft Tissue Neck w/o Contrast    Narrative    CT SOFT TISSUE NECK W/O CONTRAST 3/6/2019 12:52 PM    History:  sepsis s/p recent wisdom teeth extraction with neck pain and  stiffness, eval abscess      Comparison:  Same day head CT, CT chest 9/23/2017,  2/28/2008    Technique: Thin section helical CT images were obtained from the skull  base down to the level of the aortic arch without intravenous  contrast.  Axial, coronal and sagittal reformations were performed  with 2-3 mm slice thickness reconstruction. Images were reviewed in  soft tissue, lung and bone windows.    Findings:     Examination is limited by lack of intravenous contrast.    Postsurgical changes of left superior wisdom tooth extraction. There  is adjacent soft tissue thickening along the alveolar ridge, without  focal fluid collection. There is a bony defect along the floor of the  maxillary sinus at the site of wisdom tooth extraction, measuring 7 x  6 mm (transverse by AP)). Complex fluid is seen within the left  maxillary sinus, with areas of hyperdensity. There is an air-fluid  level within the superior aspect of the left maxillary sinus. The left  ostiomeatal unit appears occluded. The remainder of the visualized  paranasal sinuses are relatively clear.    The mucosal spaces are grossly unremarkable on this noncontrast  examination. The major salivary glands are unremarkable in appearance.  The left lobe of the thyroid is diminutive in appearance, with foci of  internal calcifications, similar in appearance to chest CT dated  2/28/2008.    No bulky cervical lymphadenopathy. Moderate atherosclerotic  calcifications about the left carotid bifurcation. Noncontrast  evaluation of the vessels of the neck is otherwise unremarkable.      Impression    Impression:  1. Findings concerning for cellulitis overlying the site left  maxillary wisdom to extraction. No focal fluid collection/abscess.  2. Findings compatible with odontogenic sinusitis, with complex and/or  hemorrhagic debris within the left maxillary sinus.   MR Brain for Stroke Limited    Narrative    PRELIMINARY REPORT - The following report is a preliminary  interpretation.      Impression    Impression:   1. No evidence of acute infarct.  Focal areas of encephalomalacia in  the bilateral occipital lobes, likely representing chronic infarct.  2. Diminutive calcarine branch of the right PCA. No corresponding  restricted diffusion to suggest acute occlusion.  3. Left maxillary sinusitis.     MRA Brain ([facility]) wo Contrast    Narrative    PRELIMINARY REPORT - The following report is a preliminary  interpretation.      Impression    Impression:   1. No evidence of acute infarct. Focal areas of encephalomalacia in  the bilateral occipital lobes, likely representing chronic infarct.  2. Diminutive calcarine branch of the right PCA. No corresponding  restricted diffusion to suggest acute occlusion.  3. Left maxillary sinusitis.         Recent vital signs:   BP 90/46   Pulse 97   Temp 97.7  F (36.5  C) (Oral)   Resp 19   Wt (!) 168.5 kg (371 lb 7.6 oz)   SpO2 100%   BMI 51.81 kg/m      Cardiac Rhythm: Tachycardia  Pt needs tele? Yes  Skin/wound Issues: Bi lat lower extremities healing sores    Code Status: Full Code    Pain control: good    Nausea control: good    Abnormal labs/tests/findings requiring intervention: N/A    Family present during ED course? No   Family Comments/Social Situation comments: N/A    Tasks needing completion: None    Clarke Capellan, RN    [phone] [facility]  [phone] [facility]

## 2019-03-07 NOTE — ED NOTES
RN discussed with ED MD use of Levophed drip, ED MD discussed holding off on drip as MAP's are above 60.

## 2019-03-07 NOTE — PROGRESS NOTES
[facility], [place]    Progress Note - [place] 1 Service        Date of Admission:  3/6/2019    Assessment & Plan   Clarke Moreira is a 68 year old male with PMH of afib on warfarin, HFpEF, T2DM, and recent dental work (2/26/19) who is admitted on 3/6/2019 with severe sepsis and MICHELLE. He in clinically stable, lactic acidosis improved following fluids. He was found to have possible bilateral great toe osteomyelitis as well as left maxiallary sinusitis. Started on broad spectrum antibiotics. With borderline BPs throughout the day.    Sever sepsis: Resolving  Recent dental procedures with tooth extraction 2/26/19:  Left Maxillary sinusitis: Tachypnea, tachycardia, lactic acid 2.8 on admit, and new MICHELLE.  S/p molar tooth extraction 2/26/19 with dry socket, was not on abx prior to admission. Recently reported URI with cough and chills but no fevers, has been improving.  Initial concern for bone/soft tissue source given possible bilateral great toe osteomyelitis.  However at this time suspect that sepsis more likely 2/2 sinusitis/recent dental extraction.  Splinter hemorrhages mildly concerning for infective endocarditis, however given low suspicion will monitor labs and order ECHO.  Lactate and WBC normalized.  CRP not elevated.  BPs remain soft.   - CT neck concerning for cellulitis, sinusitis near wisdom teeth extraction  - Follow up blood cultures   - Continuing vanc/zosyn for now    MRSA nares negative     Could consider step down to Augmentin if felt that sepsis was 2/2 sinusitis  - OMFS consulted, appreciate recs  - Continue PTA peridex 15 mL BID swish and spit per dental  - PT/OT consults, strength and ADLs     MICHELLE: Baseline creatinine 1.0.  Increased to 4.15 on admission, downtrending after fluids.  UA with 1 hylaine cast, otherwise bland. Making good urine. H/o BPH with retention at times. Has had decreased PO intake over past week. Suspect prerenal with decreased PO intake and  hypotension with sepsis. Improving after fluids.  Retroperitoneal US without hydro or obvious parenchymal changes.  - PRN bladder scan/cath  - Will restart PTA tamsulosin once BP firmly stabilized  - Holding PTA diruetics   - CTM    Diabetic foot ulcers:  Hx PAD:  Concern for osteomyelitis of bilateral great toes: Last VICK 9/24/17 with possible occlusion of right anterior tibial artery and dorsalis pedisBlack eschar on b/l great toes, first noticed 3/4, last noted normal 10 days ago, was seen by [facility] podiatry 1/15/19 for sharp debridement of L hallux. XR imaging with irregularity of the tuft of the distal first phalanx indicating possible osteomyelitis.  Last A1c 6.1, CRP 5.9, ESR 19, less likely osteomyelitis given nl labs.  At this time suspect that sepsis more likely 2/2 dental procedure as above.    - Daily dressing changes, keep clean/dry  - WBAT w/ protective footwear for transfers and PT  - Podiatry consulted, appreciate recs  - ABX as above    Supratherapeutic INR:  Afib s/p ablation 2008 on warfarin:  In afib rate low 100s on exam. [id number] 4. INR 3.92 on admission.  - warfarin dosing per pharmacy  - Hold PTA metoprolol for now with low BP, will plan to resume as able  - Telemetry     HFpEF: Prior systolic heart failure, most recent echo 10/2018 with EF 55-60%. CXR with pulmonary venous congestion, improved when compared to 1/5/2018, trace left pleural effusion and mild bibasilar atelectasis. BNP elevated on admit 2,329. Troponin 0.00.  - Hold PTA torsemide 150 mg BID and spironolactone 25 mg daily, metoprolol succinate 50 mg daily   - CXR with trace pleural effusion, bibasilar atelectasis  - ECHO today as above  - Fluids with care  - Follows in core clinic     Prior CVA:  CT head, MRI/MRA brain with no acute infarct. Noted some leukoaraiosis, b/l occipital encephalomalacia likely prior infarct. Chronic infarct noted in cerebellum.  - Neurology consulted in ED, signed off 3/6/19    Left temple lesion:  "  Hx BCC: Site of prior skin biopsy/exisino.  Per patient has become chronic, never fully heals and bleeds easily.  Path from 2014 consistent with BCC.  Concerning for incomplete resection/marjolin's ulcer?    - Outpatient follow up with PCP or dermatology    Chronic Issues:  THONG:  CPAP used at home, patient declines CPAP here in hospital, does not like our masks  DMII:  Last A1c 6.1 on 2/26/19.  Hold PTA metformin in the setting of sepsis  Hypothyroidism: PTA levothyroxine  HTN: Holding PTA lisinopril, spironolactone, torsemide, metoprolol as above  Depression/anxiety: NTD     Diet: Combination Diet 5507-0077 Calories: Moderate Consistent CHO (4-6 CHO units/meal); 2 gm NA Diet    Fluids: IVF boluses PRN  Lines: PIV  DVT Prophylaxis: PTA Warfarin, pharmacy to dose  Bazzi Catheter: not present  Code Status: DNR/DNI  patient would like to discuss pressors if he needs this, if he is unable to discuss pressors because he is so altered/hypotensive, then he would NOT want pressors and the medical team should \"just let me go\"    Disposition Plan   Expected discharge: 4 - 7 days, recommended to prior living arrangement once SIRS/Sepsis treated.  Entered: Jesús Olivas MD 03/07/2019, 7:00 AM       The patient's care was discussed with the Attending Physician, Dr. Kennedy.    MD [facility]  [facility] [place]  Pager: [phone]  Please see sticky note for cross cover information  ______________________________________________________________________    Interval History   Patient feeling much better this morning.    Has vague memories of the events in the ER on admission.  States that his visual symptoms and weakness/fatigue have improved.  No fevers or chills overnight.  No shortness of breath.  No nausea or vomiting.  No adb pain.    Baseline peripheral neuropathy is stable.    Does not think toe wounds have changes significantly.    Restates that all symptoms " started after recent dental extraction.      Data reviewed today: I reviewed all medications, new labs and imaging results over the last 24 hours.    Physical Exam   Vital Signs: Temp: 97.7  F (36.5  C) Temp src: Oral BP: 105/44 Pulse: 97 Heart Rate: 91 Resp: 16 SpO2: 100 % O2 Device: Nasal cannula Oxygen Delivery: 2 LPM  Weight: 372 lbs 5.71 oz    GEN: Adult male, resting supine in bed. NAD  HEENT: PERRL, no conjunctival injection, anicteric, tooth extraction site without bleeding, minimal purulent exudate and erythema, Neck supple without meningismus, no anterior cervical or supraclavicular LAD, wearing sunglasses  CV: Tachy, irregularly irregular, NMRG (heart sounds distant 2/2 body habitus). Warm, well-perfused extremities, unable to appreciate JVD  RESP: CTAB, normal WOB  GI: soft, non-tender, panis with clean/dry interiginous area, no masses or organomegaly appreciated.  Normal bowel sounds.  MSK: Chronic appearing wounds bilaterally on the tips of the great toes and left third digit, dry with black eschar and no proximal erythema.  Bilateral legs with trace non pitting edema and chronic appearing coloration consistent with venous stasis.  Splinter hemorrhages visible in distal nail beds on upper and lower extremities, with changes consistent with onychomycosis  Skin: Warm, dry. Left temple lesion with depressed center and raised borders with area of scabbing.    Neuro: Alert, CNs II-XII grossly intact. Sensation and motor function of extremities grossly intact.  No clonus or hypperreflexia  Psych: Appropriate mood and affect.

## 2019-03-07 NOTE — CONSULTS
[facility], Orthopaedic Surgery Consultation    Clarke Moreira MRN# [id number]   Age: 68 year old YOB: 1950     Date of Admission:  3/6/2019    Reason for consult: Toe wounds       Requesting physician: Chrissy         Assessment and Plan:   Assessment:  - Diabetic right hallux wound  - DM type 2, controlled  - Diabetic peripheral neuropathy  - Tyloma  - Onychomycosis    Plan:  - Pt seen and evaluated. Diagnosis and treatment options discussed. No signs of active infection to R hallux. Underlying wound has no bone exposure. No need for surgical procedure while inpatient or in the near future. Xray findings more likely due to chronic degenerative change than osteomyelitis, though this cannot be excluded definitively. Watch for signs of clinical improvement on IV antibiotic therapy.  - Devitalized tissue of wound was excisionally debrided with #15 blade to level of subcutaneous tissue at the deepest. Healthy bleeding occurred, no anesthesia necessary.  - Wound care instructions, perform daily: 1. Spray with microklenz, pat dry 2. Apply small drop of MediHoney to square of optifoam, apply to wound 3. Secure with loosely applied coban.   - Orthotics referral for diabetic shoe and insert fitting. Also needs open toed surgical shoe or DH shoe so that all pressure is removed from distal hallux.   - WBAT  - Toenails debrided x 2  - Tyloma trimmed x 1  - Follow up in podiatry/wound clinic in 1-2 weeks after discharge.           History of Present Illness:   Patient was seen and examined by me. History, PMH, Meds, SH, ROS and PE reviewed with patient and prior medical records.      Clarke is a 68 year old male who was admitted to [facility] on 3/6/19 for severe sepsis with MICHELLE. Etiology currently includes possible osteomyelitis of the R hallux versus L maxillary sinusitis and recent tooth extraction 2/26. Follows with [facility] podiatry regularly for routine diabetic foot care. Bilateral hallux xrays show  irregularity of tuft of distal phalanges, left has loose bony body. No obvious open lesions, but there is callus build up to both distal halluces. Clarke denies pain to toes due to neuropathy. Denies open lesions, drainage, redness or edema to toes. Admits chronic LE edema that is unchanged from baseline.           Past Medical History:     Past Medical History:   Diagnosis Date     Atrial fibrillation (H)      Basal cell carcinoma      Chronic anticoagulation      Diastolic heart failure (H)      Dyslipidemia      Essential hypertension      Morbid obesity (H)      Sleep-disordered breathing      Type 2 diabetes mellitus (H)              Past Surgical History:     Past Surgical History:   Procedure Laterality Date     BIOPSY OF SKIN LESION       MOHS MICROGRAPHIC PROCEDURE       PICC INSERTION Right 09/24/2017    5fr TL Bard PICC, 51cm (1cm external) in the R medial brachial vein w/ tip in the SVC.             Social History:     Social History     Socioeconomic History     Marital status: Single     Spouse name: None     Number of children: None     Years of education: None     Highest education level: None   Occupational History     None   Social Needs     Financial resource strain: None     Food insecurity:     Worry: None     Inability: None     Transportation needs:     Medical: None     Non-medical: None   Tobacco Use     Smoking status: Never Smoker     Smokeless tobacco: Never Used   Substance and Sexual Activity     Alcohol use: No     Comment: Former alcohol abuse. Quit 70s then quit later in 80s-present     Drug use: No     Comment: history of LSD use     Sexual activity: None   Lifestyle     Physical activity:     Days per week: None     Minutes per session: None     Stress: None   Relationships     Social connections:     Talks on phone: None     Gets together: None     Attends Buddhism service: None     Active member of club or organization: None     Attends meetings of clubs or organizations: None  "    Relationship status: None     Intimate partner violence:     Fear of current or ex partner: None     Emotionally abused: None     Physically abused: None     Forced sexual activity: None   Other Topics Concern     Parent/sibling w/ CABG, MI or angioplasty before 65F 55M? Not Asked   Social History Narrative    Lives in an apartment in 16th floor near hospital.  Has elevators, unable to use stairs. Not able to shop; has things delivered to him including food. Difficulty completing cleaning. Goes to PCP once yearly for annual check up and medication review.             Family History:     Family History   Problem Relation Age of Onset     Depression Mother      Glaucoma Maternal Grandfather      Cancer No family hx of         No family history of skin cancer     Macular Degeneration No family hx of               Medications:     Current Facility-Administered Medications   Medication     aspirin EC tablet 81 mg     atorvastatin (LIPITOR) tablet 40 mg     chlorhexidine (PERIDEX) 0.12 % solution 15 mL     lactated ringers BOLUS 500 mL     levothyroxine (SYNTHROID/LEVOTHROID) tablet 175 mcg     naloxone (NARCAN) injection 0.1-0.4 mg     nystatin (MYCOSTATIN) cream     ondansetron (ZOFRAN-ODT) ODT tab 4 mg    Or     ondansetron (ZOFRAN) injection 4 mg     Patient is already receiving anticoagulation with heparin, enoxaparin (LOVENOX), warfarin (COUMADIN)  or other anticoagulant medication     piperacillin-tazobactam (ZOSYN) 3.375 g vial to attach to  mL bag     vancomycin place ebyer - receiving intermittent dosing             Allergies:    No Known Allergies         Review of Systems:   As noted in HPI          Physical Exam:   COMPLETE EXAMINATION:   VITAL SIGNS: BP 94/64 (BP Location: Left arm)   Pulse 97   Temp 97  F (36.1  C) (Axillary)   Resp 20   Ht 1.803 m (5' 11\")   Wt (!) 168.9 kg (372 lb 5.7 oz)   SpO2 100%   BMI 51.93 kg/m    Gen: Pt is awake, alert, cooperative and in NAD.   Vasc: DP and PT " pulses 2/4 right foot, 1/4 left foot. CRT <3 seconds. Diminished pedal hair growth. Mild non-pitting edema to bilateral LEs with venous stasis erythema.   MSK: Dorsally contracted digits x 10 - bilateral halluces approach ground at tip of distal phalanx.  Derm: Hyperkeratotic tissue build up noted to distal halluces with ecchymosis. Upon debridement of right hallux callus, there is an open lesion present. No other open lesions noted. Toenails are thickened, discolored and elongated x 10.    A diabetic wound is noted at right  distal/plantar hallux measuring 1.3 cm x 1 cm x 0.1cm.  Sosa Classification: 1  Wound base: Red/Granulation  Edges: hyperkeratotic  Drainage: small/serosanguinous  Odor: None  Undermining: No  Bone Exposure: No  Clinical Signs of Infection: No - no purulence, bone exposure, odor, erythema, edema, calor.              Data:   All pertinent laboratory data reviewed  All imaging studies reviewed by me.    Recent Labs   Lab Test 03/07/19  0449 03/06/19  2339 03/06/19  1728 03/06/19  1110  01/06/18  0728 01/05/18  1109   HGB 13.9 13.2*  --  14.8   < > 12.7* 13.4   SED  --   --  19  --   --   --   --    CRP  --   --   --  5.9  --  8.9* 6.2   WBC 10.0 11.4*  --  11.9*   < > 7.2 6.5    < > = values in this interval not displayed.     No results for input(s): FTYP, FNEU, FOTH, FCOL, FAPR, FWBC in the last 71643 hours.    Signed:    Paula Emery PA-C  Department of Orthopaedics   Pager: [phone]

## 2019-03-07 NOTE — PHARMACY-ADMISSION MEDICATION HISTORY
"Admission medication history interview status for the 3/6/2019 admission is complete. See Epic admission navigator for allergy information, pharmacy, prior to admission medications and immunization status.     Medication history interview sources:  Patient    Changes made to PTA medication list (reason)  Added: N/A (per pt)  Deleted: N/A (per pt)  Changed:   -ascorbic acid: added \"1,000mg by mouth daily.\" (per pt)  -metoprolol succinate: 50mg daily --> 100mg daily (Per pt, dose was increased from 50mg to 100mg daily in 2018.)  -nystatin cream: added \"(legs and feet) once every Friday.\" (per pt. Pt noted last Friday's dose was missed.)  -vitamin E complex: added \"1,000mg by mouth daily.\" (per pt)  *warfarin: 12.5mg by mouth daily --> (Per pt, has had regimen of [10mg every Sunday and 12.5mg every other day of the week] since June 2018. Pt reported INR measured \"every few months\" when insurance will cover the service; last INR 2/26/19 but cannot recall INR.)    Additional medication history information (including reliability of information, actions taken by pharmacist):  -Pt was able to recall medication dosing regimens, last doses taken.   -potassium chloride: Per pt, has been changing between equivalent doses of tablet and package (but never duplicating doses per day). Reported tablet more difficult to swallow but packet powder with \"bad taste.\"   -Pt noted home nurse helps with organizing medications.  -Pt denied other prescription and over-the-counter medications.  -NKDA confirmed, per pt.  -Home pharmacy: [facility] ([phone])      Prior to Admission medications    Medication Sig Last Dose Taking? Auth Provider   ammonium lactate (LAC-HYDRIN) 12 % cream Apply topically 2 times daily as needed for dry skin Past Month at mid-Feb. 2019 Yes Neville Moore MD   Ascorbic Acid (VITAMIN C PO) Take 1,000 mg by mouth daily  3/6/2019 at AM Yes Reported, Patient   aspirin 81 MG tablet Take 1 tablet (81 mg) by mouth " daily 3/5/2019 at PM Yes Ari Reyna MD   atorvastatin (LIPITOR) 40 MG tablet Take 1 tablet (40 mg) by mouth daily 3/5/2019 at PM Yes Matthias Sanchez MD   levothyroxine (SYNTHROID/LEVOTHROID) 175 MCG tablet Take 1 tablet (175 mcg) by mouth daily 3/6/2019 at AM Yes Matthias Sanchez MD   lisinopril (PRINIVIL/ZESTRIL) 5 MG tablet Take 1 tablet (5 mg) by mouth daily Take in Pm 3/5/2019 at PM Yes Sybil Norman APRN CNP   metFORMIN (GLUCOPHAGE) 500 MG tablet Take 1 tablet (500 mg) by mouth 2 times daily (with meals) 3/6/2019 at 1 dose - AM Yes Matthias Sanchez MD   metoprolol succinate ER (TOPROL-XL) 100 MG 24 hr tablet Take 0.5 tablets (50 mg) by mouth daily  Patient taking differently: Take 100 mg by mouth daily  3/5/2019 at PM Yes Sybil Norman APRN CNP   multivitamin, therapeutic with minerals (MULTI-VITAMIN) TABS tablet Take 1 tablet by mouth daily 3/6/2019 at AM Yes Matthias Sanchez MD   omega 3 1000 MG CAPS Take 1 g by mouth daily 3/5/2019 at PM Yes Matthias Sanchez MD   potassium chloride (KLOR-CON) 20 MEQ packet Take 40 mEq by mouth 2 times daily 3/6/2019 at 1 dose - AM Yes Sybil Norman APRN CNP   senna-docusate (SENOKOT-S/PERICOLACE) 8.6-50 MG tablet Take 1-2 tablets by mouth 2 times daily as needed for constipation Past Month at Unknown time Yes Matthias Sanchez MD   spironolactone (ALDACTONE) 25 MG tablet Take 1 tablet (25 mg) by mouth daily 3/6/2019 at AM Yes Matthias Sanchez MD   tamsulosin (FLOMAX) 0.4 MG capsule Take 1 capsule (0.4 mg) by mouth daily 3/5/2019 at PM Yes Matthias Sanchez MD   torsemide (DEMADEX) 100 MG tablet Take 1.5 tablets (150 mg) by mouth 2 times daily 3/6/2019 at 1 dose - AM Yes Norman, Sybil T, APRN CNP   vitamin B complex with vitamin C (VITAMIN  B COMPLEX) TABS tablet Take 1 tablet by mouth daily 3/6/2019 at AM Yes Reported, Patient   VITAMIN E COMPLEX PO Take 1,000 Units by mouth daily  3/5/2019 at AM Yes Reported, Patient   warfarin (COUMADIN) 5 MG tablet Take 2.5  tablets (12.5 mg) by mouth daily  Patient taking differently: Take 2 tablets (10 mg) by mouth on Sundays. Take 2.5 tablets (12.5 mg) by mouth every other day of the week. 3/5/2019 at PM - 12.5 mg Yes Matthias Sanchez MD   blood glucose monitoring (ONE TOUCH DELICA) lancets Use to test blood sugars 2 times daily or as directed.   Matthias Sanchez MD   blood glucose monitoring (ONE TOUCH ULTRA MINI) meter device kit Use to test blood sugars 2 times daily or as directed.   Neville Moore MD   blood glucose monitoring (ONETOUCH ULTRA) test strip Use to test blood sugars 2 times daily or as directed.   Matthias Sanchez MD   nystatin (MYCOSTATIN) cream Apply twice daily to affected area  Patient taking differently: Apply topically twice daily to affected area (legs and feet) once every Friday. 2/22/2019  Matthias Sanchez MD   order for DME Equipment being ordered: Bariatric Lift chair  Diagnosis - morbid obesity, CHF, Lymphedema    Fax to Ne from [employer] at [phone].   Matthias Sanchez MD   order for DME Equipment being ordered: Other: Velcro Compression stockings.   Treatment Diagnosis: bilateral lymphedema.   Oswald Mcneal MD   polyethylene glycol (MIRALAX/GLYCOLAX) packet Take 17 g by mouth daily as needed for constipation Unknown at Unknown time  Matthias Sanchez MD         Medication history completed by: Nedra Lanier

## 2019-03-07 NOTE — PROGRESS NOTES
Admission          3/6/2019 10:57 AM  -----------------------------------------------------------  Reason for admission: hypotension  Primary team notified of pt arrival.  Admitted from: ED  Via: stretcher  Accompanied by: self  Belongings: Placed in closet  Admission Profile: incomplete  Teaching: orientation to unit and call light- call light within reach, call don't fall, use of console, meal times, when to call for the RN, and enforced importance of safety   Access: PIV  Telemetry:Placed on pt  Ht./Wt.: complete  2 RN Skin Assessment Completed By: Dre Rivera RN & Kemi Arroyo RN  Pt status: stable    Temp:  [97.7  F (36.5  C)] 97.7  F (36.5  C)  Pulse:  [] 97  Heart Rate:  [] 102  Resp:  [8-27] 10  BP: ()/(23-97) 98/48  SpO2:  [93 %-100 %] 100 %

## 2019-03-07 NOTE — PLAN OF CARE
Neuro: A&Ox4.   Cardiac: A-Fib rate  at rest VSS. BP soft Goal map > 60 this shift.   Respiratory: Sating 99% on 2L.  GI/: Adequate urine output. No BM this shift.   Diet/appetite: Tolerating Mod Carb diet. Eating well.  Activity:  Assist of 2-edge of bed. Need lift for out of bed.  Pain: At acceptable level on current regimen.-Declined need for pain interventions.    Skin: Black scabs to Bilateral great toes with redness to surrounding toes.   LDA's: R PIV AC-TKO+ABX. R lower PIV-LR bolus.     Plan: Tests today: ECHO, Renal ultrasound. Podiatry consult-need to see patient. Oral maxillary facial surgery consult-need to see pt. MRSA swab sent, negative. Continue vancomycin and zosyn. Continue with POC. Notify primary team with changes.

## 2019-03-07 NOTE — H&P
[facility], [place]    History and Physical - [facility] Night Service        Date of Admission:  3/6/2019    Assessment & Plan   Clarke Moreira is a 68 year old male with PMH of afib on warfarin, HFpEF, T2DM, and recent dental work (2/26/19) who is admitted on 3/6/2019 with severe sepsis and MICHELLE. He in clinically stable, lactic acidosis improved following fluids. He was found to have bilateral great toe osteomyelitis as well as left maxiallary sinusitis. Started on broad spectrum antibiotics.     # Severe Sepsis  # Recent dental procedures with tooth extraction 2/26/19  # Left Maxillary sinusitis  # Diabetic foot ulcers, bilateral great toes  # Osteomyelitis of bilateral great toes  Tachypnea, tachycardia, lactic acid 2.8 on admit, and new MICHELLE. Black eschar on b/l great toes, first noticed 3/4, last noted normal 10 days ago, was seen by [facility] podiatry 1/15/19 for sharp debridement of L hallux. XR imaging with irregularity of the tuft of the distal first phalanx indicating osteomyelitis.  Last A1c 6.1, CRP 5.9, ESR 19, less likely osteomyelitis given nl labs. Ortho consulted in ED with recs, no surgical intervention indicated at this time. S/p molar tooth extraction 2/26/19 with dry socket, was not on abx prior to admission. Recently reported URI with cough and chills but no fevers, has been improving. WBC 11.9, CRP 5.9, ESR 19, LA 2.8-->1.0.  - WBAT w/ protective footwear for transfers and PT  - Elevate LEs to level of heart  - Daily dressing changes, keep clean/dry  - Consider ABIs   - PT/OT consults, strength and ADLs  - Podiatry consulted for daily wound cares  - Follow blood cultures x2 pending  - CXR with trace pleural effusion, bibasilar atelectasis  - CT neck concerning for cellulitis, sinusitis near wisdom teeth extraction  - Continue PTA peridex 15 mL BID swish and spit for dental  - Vancomycin continued  - Zosyn continued    # MICHELLE  # H/o BPH  UA with 1 hylaine cast,  otherwise bland. Making good urine. H/o BPH with retention at times. Has had decreased PO intake over past week. Suspect prerenal with decreased PO intake and hypotension with sepsis.   IV: ? ~2500ml per ED provider note + MAR  UOP: 1550ml so far  -Strict I/Os  -Avoid nephrotoxic medications  -Renal US ordered  -Bladder scan and straight cath PRN  -Hold PTA tamsulosin with hypotension, consider restarting in AM  -hold PTA diuretics    # Supratherapeutic INR  # Afib s/p ablation 2008 on warfarin  In afib rate low 100s on exam. [id number] 4. INR 3.92 on admission, reports persistent oozing from wisdom teeth extraction, improved now.  - warfarin dosing per pharmacy  - AM CBC, INR  - Hold PTA metoprolol for now with low BP on admission  - Telemetry    # Lightheadedness  In the setting of hypotension with sepsis. Has a history of vertigo, but this is different per patient report. Suspicious for orthostatic hypotension given history. No evidence of acute stroke on imaging.  -Consider Orthostatics  -Monitor    # HFpEF  Prior systolic heart failure, most recent echo 10/2018 with EF 55-60%. CXR with pulmonary venous congestion, improved when compared to 1/5/2018, trace left pleural effusion and mild bibasilar atelectasis. BNP elevated on admit 2,329. Troponin 0.00.  -Gentle fluids, 500 mL NS now  -Hold PTA torsemide 150 mg BID and spironolactone 25 mg daily   -Hold metoprolol succinate 50 mg daily   -Consider echo if persistent issues with hypotension  -Follows in core clinic    # Prior CVA  CT head, MRI/MRA brain with no acute infarct. Noted some leukoaraiosis, b/l occipital encephalomalacia likely prior infarct. Chronic infarct noted in cerebellum.  - Neurology consulted in ED, signed off 3/6/19  - Q4H neuro checks    Chronic Issues:  # THONG  -CPAP used at home, patient declines CPAP here in hospital, does not like our masks  # DMII  Last A1c 6.1 on 2/26/19.  -Hold PTA metformin in the setting of sepsis  #  "Hypothyroidism  -PTA levothyroxine       Diet: Orders Placed This Encounter      Combination Diet 7034-2212 Calories: Moderate Consistent CHO (4-6 CHO units/meal); 2 gm NA Diet  Fluids: 500 NS over 2 hours  DVT Prophylaxis: PTA warfarin  Bazzi Catheter: not present  Code Status: DNR/DNI - patient would like to discuss pressors if he needs this, if he is unable to discuss pressors because he is so altered/hypotensive, then he would NOT want pressors and the medical team should \"just let me go\"    Disposition Plan   Expected discharge: 2 - 3 days, recommended to pending PT/OT once antibiotic plan established, renal function improved and SIRS/Sepsis treated.  Entered: Kaitlin Lowe MD 03/06/2019, 11:10 PM       The patient's care was discussed with the Attending Physician, Dr. Tran.    Kaitlin Lowe MD  [facility], [place]  Pager: [phone]  Please see sticky note for cross cover information  ______________________________________________________________________    Chief Complaint   Weakness    History is obtained from the patient.    History of Present Illness   Clarke Moreira is a 68 year old male with a PMH of afib on warfarin, HFpEF, T2DM, and recent dental work (2/26/19) who presents with weakness, lightheadedness, and vision changes.    The patient was seen for recent dental work on the [place] [employer] on February 26.  He had a right lower side crown done as well as to left upper wisdom teeth removed.  He states all of the dental work took place between 1 PM and 2:45 PM but one wisdom tooth took until 6:30 PM to be removed.  He went home where he felt worn out.  For the next week he slept a lot.  He was on a soft diet due to the dental work.  He did have slightly decreased p.o. intake during that time.  He felt worn out and did not take care of his legs or feet \"like he should.\" On Monday, 3/4, he looked at his feet and noticed rash and a blister he states that his " "toes were in \"awful condition\" and he did his best to take care of them.    On Tuesday, 3/5, the patient had a follow-up dental appointment he did take [employer] to this appointment.  He states that he did not feel well, had some vision changes, weakness generalized, and a headache.  He went home after this appointment and slept for the night.  This morning, 3/6, he was so weak he was unable to clothe himself.  This weakness was diffuse in all extremities including his head and neck.  He continued to have a headache today.  He also had vision changes.  This prompted him to call EMS and come to the emergency department.    He endorses intermittent chills, nausea, diarrhea up to 3 times per day without blood, increased swelling in his abdomen.  Denies fevers, vomiting, decreased urine output.  He does note that sometimes it is difficult for him to urinate and feels like he occasionally has some urinary retention.    He has had what he calls obstructive vision since early fall, maybe September 2018.  He describes these vision changes as though he is \"looking into the sun \"and describes this as a glare that he sees.  He also notes that he will have color vision only at the edges of his vision but he states he can still see.  This often lasts only a few seconds and will occur up to 4 times a week.  Denies any halos around lights.  He states that this is the vision change that he has been having since 2/26 and this is the vision change that was present at this morning.  He reports currently he has no vision changes.  These vision changes improved when he lays down.    The patient reports a history of vertigo.  He has been having episodes of dizziness that feel more like lightheadedness.  He has been working with his primary care doctor to reduce blood pressure medications due to these symptoms.  He has most recently reduced his lisinopril dose to 5 mg daily.  He feels like he has been having increased lightheadedness " "the past few days.  He experiences this when he is too quick to stand up, bends over, or moves too quickly.  He will \"feel faint \"and weak.  Denies falls.  Denies focal weakness.    He notes he was recently ill in January with possibly the flu.  He felt congested, weak, decreased oral intake, shortness of breath.  He denies fevers at that time.  He states he is overall feeling improved from those symptoms.    On arrival to the emergency department he was noted to be afebrile, tachycardic to the 110s, hypotensive with systolics in the 80s, and had a lactic acid of 2.8.  A stroke code was called due to his symptoms of vision changes and weakness.  Head CT and MRI demonstrated old stroke but no acute changes.  Neurology saw the patient in the emergency department and signed off.  He was noted to have possible infection of his great toes bilaterally and orthopedics was consulted in the emergency department.  Vancomycin and Zosyn were started in the emergency department. A new AK I was discovered in the emergency department and he was give ?2.5 L of fluid and noted to have good urine output.  He had persistent hypotension with systolics in the 80s, a central line was being prepped to be placed and the patient woke up and refused central line placement.  Blood pressures improved with fluids.     Review of Systems    The 10 point Review of Systems is negative other than noted in the HPI or here.     Past Medical History    I have reviewed this patient's medical history and updated it with pertinent information if needed.   Past Medical History:   Diagnosis Date     Atrial fibrillation (H)      Basal cell carcinoma      Chronic anticoagulation      Diastolic heart failure (H)      Dyslipidemia      Essential hypertension      Morbid obesity (H)      Sleep-disordered breathing      Type 2 diabetes mellitus (H)       Past Surgical History   I have reviewed this patient's surgical history and updated it with pertinent " information if needed.  Past Surgical History:   Procedure Laterality Date     BIOPSY OF SKIN LESION       MOHS MICROGRAPHIC PROCEDURE       PICC INSERTION Right 09/24/2017    5fr TL Bard PICC, 51cm (1cm external) in the R medial brachial vein w/ tip in the SVC.      Social History   Lives alone. Has home RN who comes to his apartment once every other week.  Limited mobility. Does not have proper set up at home with grab bars etc to shower, normally sponge baths.   Denies cigarette use.  Prior heavy alcohol use, last drink 20 years ago.   Distant history of marijuana and psychedelic drugs. No IV drug use ever.     Family History   I have reviewed this patient's family history and updated it with pertinent information if needed.   Family History   Problem Relation Age of Onset     Depression Mother      Glaucoma Maternal Grandfather      Cancer No family hx of         No family history of skin cancer     Macular Degeneration No family hx of        Prior to Admission Medications   Prior to Admission Medications   Prescriptions Last Dose Informant Patient Reported? Taking?   Ascorbic Acid (VITAMIN C PO) 3/6/2019 at AM  Yes Yes   Sig: Take 1,000 mg by mouth daily    VITAMIN E COMPLEX PO 3/5/2019 at AM  Yes Yes   Sig: Take 1,000 Units by mouth daily    ammonium lactate (LAC-HYDRIN) 12 % cream Past Month at mid-Feb. 2019  No Yes   Sig: Apply topically 2 times daily as needed for dry skin   aspirin 81 MG tablet 3/5/2019 at PM  No Yes   Sig: Take 1 tablet (81 mg) by mouth daily   atorvastatin (LIPITOR) 40 MG tablet 3/5/2019 at PM  No Yes   Sig: Take 1 tablet (40 mg) by mouth daily   blood glucose monitoring (ONE TOUCH DELICA) lancets   No No   Sig: Use to test blood sugars 2 times daily or as directed.   blood glucose monitoring (ONE TOUCH ULTRA MINI) meter device kit   No No   Sig: Use to test blood sugars 2 times daily or as directed.   blood glucose monitoring (ONETOUCH ULTRA) test strip   No No   Sig: Use to test blood  sugars 2 times daily or as directed.   levothyroxine (SYNTHROID/LEVOTHROID) 175 MCG tablet 3/6/2019 at AM  No Yes   Sig: Take 1 tablet (175 mcg) by mouth daily   lisinopril (PRINIVIL/ZESTRIL) 5 MG tablet 3/5/2019 at PM  No Yes   Sig: Take 1 tablet (5 mg) by mouth daily Take in Pm   metFORMIN (GLUCOPHAGE) 500 MG tablet 3/6/2019 at 1 dose - AM  No Yes   Sig: Take 1 tablet (500 mg) by mouth 2 times daily (with meals)   metoprolol succinate ER (TOPROL-XL) 100 MG 24 hr tablet 3/5/2019 at PM  No Yes   Sig: Take 0.5 tablets (50 mg) by mouth daily   Patient taking differently: Take 100 mg by mouth daily    multivitamin, therapeutic with minerals (MULTI-VITAMIN) TABS tablet 3/6/2019 at AM  No Yes   Sig: Take 1 tablet by mouth daily   nystatin (MYCOSTATIN) cream 2/22/2019 Self No No   Sig: Apply twice daily to affected area   Patient taking differently: Apply topically twice daily to affected area (legs and feet) once every Friday.   omega 3 1000 MG CAPS 3/5/2019 at PM  No Yes   Sig: Take 1 g by mouth daily   order for DME   No No   Sig: Equipment being ordered: Other: Velcro Compression stockings.   Treatment Diagnosis: bilateral lymphedema.   order for DME   No No   Sig: Equipment being ordered: Bariatric Lift chair  Diagnosis - morbid obesity, CHF, Lymphedema    Fax to Ne from [employer] at [phone].   polyethylene glycol (MIRALAX/GLYCOLAX) packet Unknown at Unknown time  No No   Sig: Take 17 g by mouth daily as needed for constipation   potassium chloride (KLOR-CON) 20 MEQ packet 3/6/2019 at 1 dose - AM  No Yes   Sig: Take 40 mEq by mouth 2 times daily   senna-docusate (SENOKOT-S/PERICOLACE) 8.6-50 MG tablet Past Month at Unknown time  No Yes   Sig: Take 1-2 tablets by mouth 2 times daily as needed for constipation   spironolactone (ALDACTONE) 25 MG tablet 3/6/2019 at AM  No Yes   Sig: Take 1 tablet (25 mg) by mouth daily   tamsulosin (FLOMAX) 0.4 MG capsule 3/5/2019 at PM  No Yes   Sig: Take 1 capsule (0.4 mg) by  mouth daily   torsemide (DEMADEX) 100 MG tablet 3/6/2019 at 1 dose - AM  No Yes   Sig: Take 1.5 tablets (150 mg) by mouth 2 times daily   vitamin B complex with vitamin C (VITAMIN  B COMPLEX) TABS tablet 3/6/2019 at AM  Yes Yes   Sig: Take 1 tablet by mouth daily   warfarin (COUMADIN) 5 MG tablet 3/5/2019 at PM - 12.5 mg  No Yes   Sig: Take 2.5 tablets (12.5 mg) by mouth daily   Patient taking differently: Take 2 tablets (10 mg) by mouth on Sundays. Take 2.5 tablets (12.5 mg) by mouth every other day of the week.      Facility-Administered Medications: None     Allergies   No Known Allergies    Physical Exam   Vital Signs: Temp: 97.7  F (36.5  C) Temp src: Oral BP: 98/48 Pulse: 97 Heart Rate: 102 Resp: 10 SpO2: 100 % O2 Device: None (Room air)    Weight: 371 lbs 7.6 oz    General: Awake, alert, in no acute distress  HEENT: Normocephalic, extraocular movements intact, PERRL, nasal cannula in place, dentition intact, no signs of bleeding intraorally on exam  Neck: No LAD  CV: Irregularly irregular, rate in low 100s, no murmur on exam. DP pulses weak, 1+. Bilateral radial pulses 2+.  Respiratory: CTAB, no wheezes or crackles  GI: Soft, obese, nondistended, bowel sounds present, mild left lower quadrant abdominal pain with deep palpation, nontender elsewhere  Skin: Small area of fungal appearing rash under central pannus, no other rashes. Few scattered skin tags in folds of abdomen.   Extremities: Bilateral LE cool to touch, patient denies pain, left great toe with 9mm x 4mm dry black eschar, no drainage or surrounding erythema. Right great toe with 15 mm x 7mm black dry eschar with no drainage or surrounding erythema.     Data   Data reviewed today: I reviewed all medications, new labs and imaging results over the last 24 hours. I personally reviewed the EKG tracing showing atrial fibrillation, the chest x-ray image(s) showing mild pulmonary congestion, bibasilar atelectasis, the head CT image(s) showing subacute to  chronic bilateral posterior cerebral artery infarcts with no mass effect or hemmorrhage. Left maxillary sinusitis and the brain MRI image(s) showing No acute infarct, .    MRA Brain 3/6:  Impression:   1. No evidence of acute infarct. Focal areas of encephalomalacia in the bilateral occipital lobes likely from prior infarct.  2. Diminutive P4 segment of the right PCA. No corresponding restricted diffusion to suggest acute occlusion.  3. Leukoaraiosis  4. Left maxillary sinusitis    CT Head 3/6:  Impression:   1. Favor subacute age or chronic age bilateral posterior cerebral artery distribution infarcts without associated mass effect or underlying intracranial hemorrhage. MRI is more sensitive to age these infarcts or to evaluate for acute ones.  2. Left maxillary sinusitis may be acute and may be complex or obstructed.    XR Right great toe:  Impression:   Irregularity of the tuft of the distal first phalanx indicating Osteomyelitis.    XR Left great toe:  Impression:   1. Irregularity of the distal tuft of the distal phalanx indicating osteomyelitis.  2. Loose bony body adjacent to the distal first phalanx, presumably fractured from the immediately adjacent donor site, acute or subacute.     CXR:  IMPRESSION:   1. No acute cardiopulmonary abnormality.  2. Pulmonary venous congestion, improved when compared to 1/5/2018.  3. Trace left pleural effusion and mild bibasilar atelectasis.       Recent Labs   Lab 03/06/19  2339 03/06/19  1113 03/06/19  1110   WBC 11.4*  --  11.9*   HGB 13.2*  --  14.8   MCV 99  --  94     --  284   INR  --   --  3.92*     --  136   POTASSIUM 4.8  --  4.7   CHLORIDE 106  --  99   CO2 20  --  24   BUN 63*  --  64*   CR 3.84*  --  4.15*   ANIONGAP 12  --  13   CHERI 8.4*  --  9.6   *  --  104*   ALBUMIN  --   --  3.5   PROTTOTAL  --   --  7.5   BILITOTAL  --   --  0.5   ALKPHOS  --   --  105   ALT  --   --  18   AST  --   --  14   TROPONIN  --  0.00  --

## 2019-03-07 NOTE — PROGRESS NOTES
MICU Note    Patient had been initially planned to transfer to the MICU team, I evaluated the patient in the ED and placed initial orders. However, ED staff transferred patient to 6B after initially placing MICU transfer orders and patient sent to 6B. I called ED but staff to clarify but provider was off shift. I discussed the patient with Dr. Tran of internal medicine, and we agrees patient is stable to remain on 6B for now. He will assess patient and notify me if he feels he should be transferred to the ICU. I notified him that I did place initial orders but have not continued antibiotics at this time.     He will notify MICU if patient's clinical status changes warranting ICU admission.     Vipul Miles MD IM PGY-2

## 2019-03-07 NOTE — PROGRESS NOTES
03/07/19 1119   Quick Adds   Type of Visit Initial PT Evaluation   Living Environment   Lives With alone   Living Arrangements apartment   Home Accessibility no concerns  (elevator access)   Living Environment Comment pt lives alonein an apartment, no TRINITY, has a tub shower in his bathroom but has not used in last few months due to not feeling safe with use. pt has home RN 2x/week, wound care. pt reports cooking and cleaning on own but these are becoming more difficult.   Self-Care   Usual Activity Tolerance fair   Current Activity Tolerance fair   Regular Exercise Yes   Activity/Exercise Type strength training  (home PT/OT exercises)   Exercise Amount/Frequency 2 times/wk   Equipment Currently Used at Home walker, rolling;grab bar, toilet;hospital bed   Activity/Exercise/Self-Care Comment reports over time has become less active, does have home PT/OT exercises he tries to do.   Functional Level Prior   Ambulation 1-->assistive equipment   Transferring 1-->assistive equipment   Toileting 1-->assistive equipment  (for transfer)   Bathing 1-->assistive equipment  (sponge baths)   Communication 0-->understands/communicates without difficulty   Swallowing 0-->swallows foods/liquids without difficulty   Cognition 0 - no cognition issues reported   Fall history within last six months no   Prior Functional Level Comment typically able to walk with FWW, transfers and performs toileting with grab bar; completes sponge baths due to small size of shower.    General Information   Onset of Illness/Injury or Date of Surgery - Date 03/06/19   Referring Physician Kaitlin Lowe MD   Patient/Family Goals Statement pt states 'i want to be healthy enough and stay out of the ER, improve my strength and activity tolerance and improve my mindset.   Pertinent History of Current Problem (include personal factors and/or comorbidities that impact the POC) Clarke Moreira is a 68 year old male with PMH of afib on warfarin, HFpEF, T2DM, and  recent dental work (2/26/19) who is admitted on 3/6/2019 with severe sepsis and MICHELLE. He in clinically stable, lactic acidosis improved following fluids. He was found to have bilateral great toe osteomyelitis as well as left maxiallary sinusitis. Started on broad spectrum antibiotics.    Precautions/Limitations oxygen therapy device and L/min;fall precautions   Weight-Bearing Status - LUE full weight-bearing   Weight-Bearing Status - RUE full weight-bearing   Weight-Bearing Status - LLE weight-bearing as tolerated   Weight-Bearing Status - RLE weight-bearing as tolerated   General Observations 2LPM O2 via NC, PIV  (uses 2LPM supplemental O2 at home when laying or sitting)   General Info Comments activity order: up with assist; separately listed in PT order 'WBAT with protective footwear, however this should be limited to transfers and PT exercises. ROM and strengthening.'   Cognitive Status Examination   Orientation orientation to person, place and time   Level of Consciousness alert   Follows Commands and Answers Questions 100% of the time   Personal Safety and Judgment intact   Memory intact   Cognitive Comment pt is alert and oriented   Pain Assessment   Patient Currently in Pain Yes, see Vital Sign flowsheet  (BLE feet; chronic)   Integumentary/Edema   Integumentary/Edema Comments necrotic great toes BLE, L 3rd toe as well. ; diffuse redness throughout BLE to mid shin; increases intensity in dependent position   Posture    Posture Forward head position;Protracted shoulders   Range of Motion (ROM)   ROM Comment BUE/BLE grossly wfl   Strength   Strength Comments BUE > 3+/5 per observation, able to lift BLE against gravity with increased strain.    Bed Mobility   Bed Mobility Comments SBA supine > sit with HOB elevated; Max A needed to return to supine   Transfer Skills   Transfer Comments unable to complete/tolerate   Gait   Gait Comments unable to assess   Balance   Balance Comments maintains midline sitting with  "SBA   Sensory Examination   Sensory Perception Comments hx neuropathy in BLE; toes numb without light touch sensation, intermittent intact light touch through mid shin. painful mid shin to light touch BLE.    General Therapy Interventions   Planned Therapy Interventions balance training;bed mobility training;gait training;neuromuscular re-education;strengthening;transfer training;home program guidelines   Clinical Impression   Criteria for Skilled Therapeutic Intervention yes, treatment indicated   PT Diagnosis impaired functional mobility   Influenced by the following impairments weakness, fatigue, balance deficits,    Functional limitations due to impairments impaired bed mobility, transfers and gait   Clinical Presentation Stable/Uncomplicated   Clinical Presentation Rationale pt presentation, clinical reasoning   Clinical Decision Making (Complexity) Low complexity   Therapy Frequency` 5 times/week   Predicted Duration of Therapy Intervention (days/wks) 2 weeks   Anticipated Discharge Disposition Transitional Care Facility   Risk & Benefits of therapy have been explained Yes   Patient, Family & other staff in agreement with plan of care Yes   Clinical Impression Comments evaluation completed, treatment initiated   [facility] \"6 Clicks\"   2016, Trustees of [facility], under license to [employer].  All rights reserved.   6 Clicks Short Forms Basic Mobility Inpatient Short Form   [facility]  \"6 Clicks\" V.2 Basic Mobility Inpatient Short Form   1. Turning from your back to your side while in a flat bed without using bedrails? 3 - A Little   2. Moving from lying on your back to sitting on the side of a flat bed without using bedrails? 3 - A Little   3. Moving to and from a bed to a chair (including a wheelchair)? 1 - Total   4. Standing up from a chair using your arms (e.g., wheelchair, or bedside chair)? 1 - Total   5. To walk in hospital room? 1 - Total   6. Climbing 3-5 " steps with a railing? 1 - Total   Basic Mobility Raw Score (Score out of 24.Lower scores equate to lower levels of function) 10   Total Evaluation Time   Total Evaluation Time (Minutes) 8

## 2019-03-07 NOTE — PHARMACY-VANCOMYCIN DOSING SERVICE
Pharmacy Vancomycin Note  Date of Service 2019  Patient's  1950   68 year old, male    Indication: Sepsis and Skin and Soft Tissue Infection  Goal Trough Level: 15-20 mg/L  Day of Therapy: 2  Current Vancomycin regimen:  Intermittent dosing    Current estimated CrCl = Estimated Creatinine Clearance: 32.2 mL/min (A) (based on SCr of 3.5 mg/dL (H)).    Creatinine for last 3 days  3/6/2019: 11:10 AM Creatinine 4.15 mg/dL  3/6/2019: 11:39 PM Creatinine 3.84 mg/dL  3/7/2019:  4:49 AM Creatinine 3.50 mg/dL    Recent Vancomycin Levels (past 3 days)  3/7/2019: 11:55 AM Vancomycin Level 19.3 mg/L (23.4h after the last dose)    Vancomycin IV Administrations (past 72 hours)                   vancomycin (VANCOCIN) 2,500 mg in sodium chloride 0.9 % 500 mL intermittent infusion (mg) 2,500 mg New Bag 19 1231              Nephrotoxins and other renal medications (From now, onward)    Start     Dose/Rate Route Frequency Ordered Stop    19 0045  piperacillin-tazobactam (ZOSYN) 3.375 g vial to attach to  mL bag      3.375 g  over 1 Hours Intravenous EVERY 6 HOURS 19 0030      19 1203  vancomycin place [facility]yer - receiving intermittent dosing      1 each Intravenous SEE ADMIN INSTRUCTIONS 19 1203             Contrast Orders - past 72 hours (72h ago, onward)    Start     Dose/Rate Route Frequency Ordered Stop    19 1100  perflutren diluted 1mL to 2mL with saline (OPTISON) diluted injection 4 mL      4 mL Intravenous ONCE 19 1049 19 1100        Interpretation of levels and current regimen:  Trough level is  Therapeutic  Has serum creatinine changed > 50% in last 72 hours: No  Urine output:  good urine output - 2200 ml out since midnight  Renal Function: Improving    Plan:  1.  Give another dose of 2500mg x1 today and continue to monitor improving renal function  2.  Pharmacy will check trough levels as appropriate in 1-3 Days.    3. Serum creatinine levels will be  ordered daily for the first week of therapy and at least twice weekly for subsequent weeks.      Keyanna Rosario, PharmD, BCCCP

## 2019-03-08 ENCOUNTER — APPOINTMENT (OUTPATIENT)
Dept: OCCUPATIONAL THERAPY | Facility: CLINIC | Age: 69
DRG: 856 | End: 2019-03-08
Payer: COMMERCIAL

## 2019-03-08 LAB
ANION GAP SERPL CALCULATED.3IONS-SCNC: 9 MMOL/L (ref 3–14)
BUN SERPL-MCNC: 40 MG/DL (ref 7–30)
CALCIUM SERPL-MCNC: 9 MG/DL (ref 8.5–10.1)
CHLORIDE SERPL-SCNC: 109 MMOL/L (ref 94–109)
CO2 SERPL-SCNC: 27 MMOL/L (ref 20–32)
CREAT SERPL-MCNC: 2.33 MG/DL (ref 0.66–1.25)
ERYTHROCYTE [DISTWIDTH] IN BLOOD BY AUTOMATED COUNT: 13.2 % (ref 10–15)
GFR SERPL CREATININE-BSD FRML MDRD: 28 ML/MIN/{1.73_M2}
GLUCOSE BLDC GLUCOMTR-MCNC: 95 MG/DL (ref 70–99)
GLUCOSE BLDC GLUCOMTR-MCNC: 97 MG/DL (ref 70–99)
GLUCOSE SERPL-MCNC: 116 MG/DL (ref 70–99)
HCT VFR BLD AUTO: 44.2 % (ref 40–53)
HGB BLD-MCNC: 14.3 G/DL (ref 13.3–17.7)
INR PPP: 3.03 (ref 0.86–1.14)
MAGNESIUM SERPL-MCNC: 1.9 MG/DL (ref 1.6–2.3)
MCH RBC QN AUTO: 31.2 PG (ref 26.5–33)
MCHC RBC AUTO-ENTMCNC: 32.4 G/DL (ref 31.5–36.5)
MCV RBC AUTO: 97 FL (ref 78–100)
PLATELET # BLD AUTO: 199 10E9/L (ref 150–450)
POTASSIUM SERPL-SCNC: 4.5 MMOL/L (ref 3.4–5.3)
RBC # BLD AUTO: 4.58 10E12/L (ref 4.4–5.9)
SODIUM SERPL-SCNC: 144 MMOL/L (ref 133–144)
VANCOMYCIN SERPL-MCNC: 25.9 MG/L
WBC # BLD AUTO: 6.4 10E9/L (ref 4–11)

## 2019-03-08 PROCEDURE — 99233 SBSQ HOSP IP/OBS HIGH 50: CPT | Performed by: INTERNAL MEDICINE

## 2019-03-08 PROCEDURE — 85610 PROTHROMBIN TIME: CPT | Performed by: STUDENT IN AN ORGANIZED HEALTH CARE EDUCATION/TRAINING PROGRAM

## 2019-03-08 PROCEDURE — 36415 COLL VENOUS BLD VENIPUNCTURE: CPT | Performed by: STUDENT IN AN ORGANIZED HEALTH CARE EDUCATION/TRAINING PROGRAM

## 2019-03-08 PROCEDURE — 25800030 ZZH RX IP 258 OP 636: Performed by: INTERNAL MEDICINE

## 2019-03-08 PROCEDURE — 36415 COLL VENOUS BLD VENIPUNCTURE: CPT | Performed by: INTERNAL MEDICINE

## 2019-03-08 PROCEDURE — 80202 ASSAY OF VANCOMYCIN: CPT | Performed by: INTERNAL MEDICINE

## 2019-03-08 PROCEDURE — 25000132 ZZH RX MED GY IP 250 OP 250 PS 637: Performed by: STUDENT IN AN ORGANIZED HEALTH CARE EDUCATION/TRAINING PROGRAM

## 2019-03-08 PROCEDURE — 80048 BASIC METABOLIC PNL TOTAL CA: CPT | Performed by: STUDENT IN AN ORGANIZED HEALTH CARE EDUCATION/TRAINING PROGRAM

## 2019-03-08 PROCEDURE — 12000004 ZZH R&B IMCU UMMC

## 2019-03-08 PROCEDURE — 25000128 H RX IP 250 OP 636: Performed by: STUDENT IN AN ORGANIZED HEALTH CARE EDUCATION/TRAINING PROGRAM

## 2019-03-08 PROCEDURE — 97530 THERAPEUTIC ACTIVITIES: CPT | Mod: GO

## 2019-03-08 PROCEDURE — 85027 COMPLETE CBC AUTOMATED: CPT | Performed by: STUDENT IN AN ORGANIZED HEALTH CARE EDUCATION/TRAINING PROGRAM

## 2019-03-08 PROCEDURE — L3260 AMBULATORY SURGICAL BOOT EAC: HCPCS

## 2019-03-08 PROCEDURE — 00000146 ZZHCL STATISTIC GLUCOSE BY METER IP

## 2019-03-08 PROCEDURE — 25000128 H RX IP 250 OP 636: Performed by: INTERNAL MEDICINE

## 2019-03-08 PROCEDURE — 97535 SELF CARE MNGMENT TRAINING: CPT | Mod: GO

## 2019-03-08 PROCEDURE — 97165 OT EVAL LOW COMPLEX 30 MIN: CPT | Mod: GO

## 2019-03-08 PROCEDURE — 83735 ASSAY OF MAGNESIUM: CPT | Performed by: STUDENT IN AN ORGANIZED HEALTH CARE EDUCATION/TRAINING PROGRAM

## 2019-03-08 RX ORDER — PIPERACILLIN SODIUM, TAZOBACTAM SODIUM 4; .5 G/20ML; G/20ML
4.5 INJECTION, POWDER, LYOPHILIZED, FOR SOLUTION INTRAVENOUS EVERY 6 HOURS
Status: DISCONTINUED | OUTPATIENT
Start: 2019-03-08 | End: 2019-03-09

## 2019-03-08 RX ADMIN — CHLORHEXIDINE GLUCONATE 15 ML: 1.2 RINSE ORAL at 20:49

## 2019-03-08 RX ADMIN — ASPIRIN 81 MG: 81 TABLET, COATED ORAL at 08:10

## 2019-03-08 RX ADMIN — ATORVASTATIN CALCIUM 40 MG: 40 TABLET, FILM COATED ORAL at 00:53

## 2019-03-08 RX ADMIN — PIPERACILLIN SODIUM AND TAZOBACTAM SODIUM 3.38 G: 3; .375 INJECTION, POWDER, LYOPHILIZED, FOR SOLUTION INTRAVENOUS at 06:36

## 2019-03-08 RX ADMIN — PIPERACILLIN AND TAZOBACTAM 4.5 G: 4; .5 INJECTION, POWDER, FOR SOLUTION INTRAVENOUS at 11:05

## 2019-03-08 RX ADMIN — PIPERACILLIN AND TAZOBACTAM 4.5 G: 4; .5 INJECTION, POWDER, FOR SOLUTION INTRAVENOUS at 23:22

## 2019-03-08 RX ADMIN — VANCOMYCIN HYDROCHLORIDE 2000 MG: 10 INJECTION, POWDER, LYOPHILIZED, FOR SOLUTION INTRAVENOUS at 15:31

## 2019-03-08 RX ADMIN — SODIUM CHLORIDE, POTASSIUM CHLORIDE, SODIUM LACTATE AND CALCIUM CHLORIDE 500 ML: 600; 310; 30; 20 INJECTION, SOLUTION INTRAVENOUS at 10:05

## 2019-03-08 RX ADMIN — PIPERACILLIN AND TAZOBACTAM 4.5 G: 4; .5 INJECTION, POWDER, FOR SOLUTION INTRAVENOUS at 18:04

## 2019-03-08 RX ADMIN — CHLORHEXIDINE GLUCONATE 15 ML: 1.2 RINSE ORAL at 08:11

## 2019-03-08 RX ADMIN — PIPERACILLIN SODIUM AND TAZOBACTAM SODIUM 3.38 G: 3; .375 INJECTION, POWDER, LYOPHILIZED, FOR SOLUTION INTRAVENOUS at 00:53

## 2019-03-08 RX ADMIN — ATORVASTATIN CALCIUM 40 MG: 40 TABLET, FILM COATED ORAL at 20:52

## 2019-03-08 RX ADMIN — LEVOTHYROXINE SODIUM 175 MCG: 25 TABLET ORAL at 08:10

## 2019-03-08 ASSESSMENT — ACTIVITIES OF DAILY LIVING (ADL)
PREVIOUS_RESPONSIBILITIES: MEAL PREP;HOUSEKEEPING;LAUNDRY;MEDICATION MANAGEMENT;FINANCES
ADLS_ACUITY_SCORE: 19

## 2019-03-08 ASSESSMENT — PAIN DESCRIPTION - DESCRIPTORS: DESCRIPTORS: CRAMPING

## 2019-03-08 NOTE — PLAN OF CARE
PT 6B; cancel - pt with limited tolerance at EOB with increase in HR; awaiting offloading footwear upon check in earlier in PM; unable to return. Will reschedule.

## 2019-03-08 NOTE — PHARMACY-VANCOMYCIN DOSING SERVICE
Pharmacy Vancomycin Note  Date of Service 2019  Patient's  1950   68 year old, male    Indication: Sepsis and Skin and Soft Tissue Infection  Goal Trough Level: 15-20 mg/L  Day of Therapy: 3  Current Vancomycin regimen:  2500 mg IV q24h    Current estimated CrCl = Estimated Creatinine Clearance: 48.4 mL/min (A) (based on SCr of 2.33 mg/dL (H)).    Creatinine for last 3 days  3/6/2019: 11:10 AM Creatinine 4.15 mg/dL; 11:39 PM Creatinine 3.84 mg/dL  3/7/2019:  4:49 AM Creatinine 3.50 mg/dL  3/8/2019:  4:52 AM Creatinine 2.33 mg/dL    Recent Vancomycin Levels (past 3 days)  3/7/2019: 11:55 AM Vancomycin Level 19.3 mg/L  3/8/2019: 10:24 AM Vancomycin Level 25.9 mg/L (~21 hour trough)    Vancomycin IV Administrations (past 72 hours)                   vancomycin (VANCOCIN) 2,500 mg in sodium chloride 0.9 % 500 mL intermittent infusion (mg) 2,500 mg New Bag 19 1336    vancomycin (VANCOCIN) 2,500 mg in sodium chloride 0.9 % 500 mL intermittent infusion (mg) 2,500 mg New Bag 19 1231                Nephrotoxins and other renal medications (From now, onward)    Start     Dose/Rate Route Frequency Ordered Stop    19 1030  piperacillin-tazobactam (ZOSYN) 4.5 g vial to attach to  mL bag      4.5 g  over 1 Hours Intravenous EVERY 6 HOURS 19 1004      19 1203  vancomycin place beyer - receiving intermittent dosing      1 each Intravenous SEE ADMIN INSTRUCTIONS 19 1203               Contrast Orders - past 72 hours (72h ago, onward)    Start     Dose/Rate Route Frequency Ordered Stop    19 1100  perflutren diluted 1mL to 2mL with saline (OPTISON) diluted injection 4 mL      4 mL Intravenous ONCE 19 1049 19 1100          Interpretation of levels and current regimen:  Trough level is  Supratherapeutic    Has serum creatinine changed > 50% in last 72 hours: No, but very close (4.15 on 3/6 to 2.33 on 3/8)    Urine output:  good urine output - 1,125 ml (since 2  pm yesterday)    Renal Function: Improving    Plan:  1.  Decrease Dose to 2000 mg vancomycin x 1 dose  2.  Pharmacy will check trough level prior to tomorrow's dose (3/9/19) given recovering renal function.  3. Serum creatinine levels will be ordered daily for the first week of therapy and at least twice weekly for subsequent weeks.      Jaylen Franco, PharmD Student

## 2019-03-08 NOTE — PLAN OF CARE
Pt A&O X4, VSS, afebrile, HR Afib 's, BP's 90's/40-50's (MAPs > 60), O2 sats > 90% on 2L via NC. LS clear/diminished, BS+ X4, baseline CMS intact (pt has neuropathy). Slept majority of overnight shift, had few requests/complaints, pleasant & cooperative, denied pain. PIV in R upper arm patent & infusing @ TKO between antibiotics, PIV in R lower arm patent & SL. Generalized bruising noted throughout, diffuse red rash to gluteal area. Bilateral great toes have black spots, redness noted to toes, R greater toe dressing/[place] CDI. Tolerating moderate CHO/2G Na diet with no nausea/emesis, NPO overnight. Voiding urine adequately via urinal, passing gas but no BM. Has call light within reach, able to make needs known, will continue to monitor per POC.

## 2019-03-08 NOTE — PLAN OF CARE
[id number]:    Neuro: A&Ox4.   Cardiac: A-Fib rate  at rest VSS. BPs soft, MAPs > 60 this shift.   Respiratory: Sating 99% on 2L.  GI/: Adequate urine output. No BM this shift.   Diet/appetite: Tolerating Mod Carb diet. Declined dinner, states had a big lunch earlier today  Activity:  Assist of 2-edge of bed. Need lift for out of bed.  Pain: At acceptable level on current regimen.-Declined need for pain interventions.    Skin: Black scabs to Bilateral great toes with redness to surrounding toes. Podiatry evaluated at bedside this shift, dressings applied  LDA's: PIV x 2, TKO, Abx     Plan: Podiatry, maxofacial surgery evaluated at bedside this shift . Continue vancomycin and zosyn. Continue with POC. Notify primary team with changes

## 2019-03-08 NOTE — PLAN OF CARE
OT/6B - Discharge Planner OT   Patient plan for discharge: hopeful for home  Current status: SBA for bed mobility supine to sitting EOB (with HOB elevated) and min A for bed mobility sitting EOB to supine. Educated on adaptive equipment and provided handout to reinforce learning. HR ranging from 120-190 bpm during session, VSS on RA.   Barriers to return to prior living situation: activity tolerance, strength  Recommendations for discharge: TCU  Rationale for recommendations: Pending LOS, pt may progress to home with home therapy to address remaining activity tolerance and equipment needs. At this time pt would benefit from continued skilled inpatient therapy.        Entered by: Sherrie Day 03/08/2019 1:04 PM

## 2019-03-08 NOTE — PROGRESS NOTES
S: order received to see patient at 6B for an eval for an offloading shoe as ordered by zoë Kennedy  O/G: offload ulcerated area on patient's bilateral  Feet.   A: patient seen for an evaluation.  Patient has ulceration(s) on his bilateral feet.  Pegs were not  removed under ulcerated area at patients request.  Patient was fit with a size large DH2 offloading shoe.  P: patient to contact us if any issues arise.  Damien SALINASLO.

## 2019-03-08 NOTE — PROGRESS NOTES
" 03/08/19 1141   Quick Adds   Type of Visit Initial Occupational Therapy Evaluation   Living Environment   Lives With alone   Home Accessibility other (see comments)  (tub/shower)   Living Environment Comment Pt lives alone at baseline with home RN weekly.  Pt reports small tub/shower limiting ability to use, so he has been taking sponge baths only.    Self-Care   Equipment Currently Used at Home walker, rolling;grab bar, toilet;hospital bed;other (see comments)  (sock aid, reacher)   Activity/Exercise/Self-Care Comment Pt reports small spaces within home and \"furniture walking\". Has walker for longer distances.   Functional Level   Ambulation 1-->assistive equipment   Transferring 1-->assistive equipment   Toileting 1-->assistive equipment   Bathing 1-->assistive equipment   Dressing 0-->independent   Eating 0-->independent   Communication 0-->understands/communicates without difficulty   Swallowing 0-->swallows foods/liquids without difficulty   Cognition 0 - no cognition issues reported   Prior Functional Level Comment Pt endorses difficulty with dressing and sometimes being unable to complete.  Pt reports small tub/shower limiting ability to use, so he has been taking sponge baths only.        Present no   General Information   Onset of Illness/Injury or Date of Surgery - Date 03/06/19   Referring Physician Kaitlin Lowe MD   Patient/Family Goals Statement Return home   Additional Occupational Profile Info/Pertinent History of Current Problem Clarke Moreira is a 68 year old male with PMH of afib on warfarin, HFpEF, T2DM, and recent dental work (2/26/19) who is admitted on 3/6/2019 with severe sepsis and MICHELLE. He in clinically stable, lactic acidosis improved following fluids. He was found to have possible bilateral great toe osteomyelitis as well as left maxiallary sinusitis. Started on broad spectrum antibiotics. With borderline BPs throughout the day.   Precautions/Limitations fall " "precautions   Weight-Bearing Status - LLE weight-bearing as tolerated  (WBAT with offloading shoe)   Weight-Bearing Status - RLE weight-bearing as tolerated  (WBAT with offloading shoe)   General Observations Pt supine in bed upon arrival, agreeable to therapy   General Info Comments Activity - up with assist   Cognitive Status Examination   Orientation orientation to person, place and time   Level of Consciousness alert   Follows Commands (Cognition) WNL   Memory intact   Cognitive Comment No cog issues reported at time of eval   Visual Perception   Visual Perception Wears glasses   Sensory Examination   Sensory Comments BLE neuropathy at baseline   Pain Assessment   Patient Currently in Pain No   Integumentary/Edema   Integumentary/Edema Comments Pt endorses fluid accumulation generally in abdomen   Range of Motion (ROM)   ROM Comment previous R shoulder injury   Strength   Strength Comments generalized weakness   Lower Body Dressing   Level of [place]: Dress Lower Body maximum assist (25% patients effort)   Instrumental Activities of Daily Living (IADL)   Previous Responsibilities meal prep;housekeeping;laundry;medication management;finances   IADL Comments Home RN weekly   Activities of Daily Living Analysis   Impairments Contributing to Impaired Activities of Daily Living balance impaired;strength decreased;sensation decreased   General Therapy Interventions   Planned Therapy Interventions ADL retraining;IADL retraining;ROM;strengthening;transfer training;home program guidelines;progressive activity/exercise   Clinical Impression   Criteria for Skilled Therapeutic Interventions Met yes, treatment indicated   OT Diagnosis OT order for \"WBAT with protective footwear, however this should be limited to transfers and exercises. D/c recs? Otherwise, the patient should focus efforts on elevating lower extremities to the level of the heart.\"   Influenced by the following impairments activity tolerance, strength,  " "  Assessment of Occupational Performance 3-5 Performance Deficits   Identified Performance Deficits dressing, bathing, home mgmt, toileting   Clinical Decision Making (Complexity) Low complexity   Therapy Frequency daily   Predicted Duration of Therapy Intervention (days/wks) 1 week   Anticipated Equipment Needs at Discharge (TBD with further therapy)   Anticipated Discharge Disposition Transitional Care Facility;Home with Home Therapy   Risks and Benefits of Treatment have been explained. Yes   Patient, Family & other staff in agreement with plan of care Yes   [facility] \"6 Clicks\"   2016, Trustees of [facility], under license to [employer].  All rights reserved.   6 Clicks Short Forms Daily Activity Inpatient Short Form   [facility] AM-PAC  \"6 Clicks\" Daily Activity Inpatient Short Form   1. Putting on and taking off regular lower body clothing? 2 - A Lot   2. Bathing (including washing, rinsing, drying)? 2 - A Lot   3. Toileting, which includes using toilet, bedpan or urinal? 2 - A Lot   4. Putting on and taking off regular upper body clothing? 3 - A Little   5. Taking care of personal grooming such as brushing teeth? 3 - A Little   6. Eating meals? 4 - None   Daily Activity Raw Score (Score out of 24.Lower scores equate to lower levels of function) 16   Total Evaluation Time   Total Evaluation Time (Minutes) 6     "

## 2019-03-09 ENCOUNTER — APPOINTMENT (OUTPATIENT)
Dept: OCCUPATIONAL THERAPY | Facility: CLINIC | Age: 69
DRG: 856 | End: 2019-03-09
Payer: COMMERCIAL

## 2019-03-09 LAB
ANION GAP SERPL CALCULATED.3IONS-SCNC: 6 MMOL/L (ref 3–14)
BUN SERPL-MCNC: 21 MG/DL (ref 7–30)
CALCIUM SERPL-MCNC: 8.4 MG/DL (ref 8.5–10.1)
CHLORIDE SERPL-SCNC: 112 MMOL/L (ref 94–109)
CO2 SERPL-SCNC: 27 MMOL/L (ref 20–32)
CREAT SERPL-MCNC: 1.51 MG/DL (ref 0.66–1.25)
ERYTHROCYTE [DISTWIDTH] IN BLOOD BY AUTOMATED COUNT: 13.2 % (ref 10–15)
GFR SERPL CREATININE-BSD FRML MDRD: 47 ML/MIN/{1.73_M2}
GLUCOSE BLDC GLUCOMTR-MCNC: 101 MG/DL (ref 70–99)
GLUCOSE BLDC GLUCOMTR-MCNC: 104 MG/DL (ref 70–99)
GLUCOSE BLDC GLUCOMTR-MCNC: 96 MG/DL (ref 70–99)
GLUCOSE SERPL-MCNC: 106 MG/DL (ref 70–99)
HCT VFR BLD AUTO: 41.7 % (ref 40–53)
HGB BLD-MCNC: 13.3 G/DL (ref 13.3–17.7)
INR PPP: 2.01 (ref 0.86–1.14)
MAGNESIUM SERPL-MCNC: 1.7 MG/DL (ref 1.6–2.3)
MCH RBC QN AUTO: 30.9 PG (ref 26.5–33)
MCHC RBC AUTO-ENTMCNC: 31.9 G/DL (ref 31.5–36.5)
MCV RBC AUTO: 97 FL (ref 78–100)
PLATELET # BLD AUTO: 195 10E9/L (ref 150–450)
POTASSIUM SERPL-SCNC: 4.1 MMOL/L (ref 3.4–5.3)
RBC # BLD AUTO: 4.3 10E12/L (ref 4.4–5.9)
SODIUM SERPL-SCNC: 145 MMOL/L (ref 133–144)
WBC # BLD AUTO: 6.5 10E9/L (ref 4–11)

## 2019-03-09 PROCEDURE — 00000146 ZZHCL STATISTIC GLUCOSE BY METER IP

## 2019-03-09 PROCEDURE — 83735 ASSAY OF MAGNESIUM: CPT | Performed by: STUDENT IN AN ORGANIZED HEALTH CARE EDUCATION/TRAINING PROGRAM

## 2019-03-09 PROCEDURE — 97535 SELF CARE MNGMENT TRAINING: CPT | Mod: GO

## 2019-03-09 PROCEDURE — 12000004 ZZH R&B IMCU UMMC

## 2019-03-09 PROCEDURE — 25000132 ZZH RX MED GY IP 250 OP 250 PS 637: Performed by: INTERNAL MEDICINE

## 2019-03-09 PROCEDURE — 25000132 ZZH RX MED GY IP 250 OP 250 PS 637: Performed by: STUDENT IN AN ORGANIZED HEALTH CARE EDUCATION/TRAINING PROGRAM

## 2019-03-09 PROCEDURE — 80048 BASIC METABOLIC PNL TOTAL CA: CPT | Performed by: STUDENT IN AN ORGANIZED HEALTH CARE EDUCATION/TRAINING PROGRAM

## 2019-03-09 PROCEDURE — 25000128 H RX IP 250 OP 636: Performed by: INTERNAL MEDICINE

## 2019-03-09 PROCEDURE — 85610 PROTHROMBIN TIME: CPT | Performed by: STUDENT IN AN ORGANIZED HEALTH CARE EDUCATION/TRAINING PROGRAM

## 2019-03-09 PROCEDURE — 99232 SBSQ HOSP IP/OBS MODERATE 35: CPT | Mod: GC | Performed by: INTERNAL MEDICINE

## 2019-03-09 PROCEDURE — 36415 COLL VENOUS BLD VENIPUNCTURE: CPT | Performed by: STUDENT IN AN ORGANIZED HEALTH CARE EDUCATION/TRAINING PROGRAM

## 2019-03-09 PROCEDURE — 85027 COMPLETE CBC AUTOMATED: CPT | Performed by: STUDENT IN AN ORGANIZED HEALTH CARE EDUCATION/TRAINING PROGRAM

## 2019-03-09 PROCEDURE — 80202 ASSAY OF VANCOMYCIN: CPT | Performed by: INTERNAL MEDICINE

## 2019-03-09 PROCEDURE — 25000132 ZZH RX MED GY IP 250 OP 250 PS 637

## 2019-03-09 RX ORDER — METOPROLOL SUCCINATE 25 MG/1
50 TABLET, EXTENDED RELEASE ORAL DAILY
Status: DISCONTINUED | OUTPATIENT
Start: 2019-03-10 | End: 2019-03-10

## 2019-03-09 RX ORDER — METOPROLOL SUCCINATE 25 MG/1
25 TABLET, EXTENDED RELEASE ORAL ONCE
Status: COMPLETED | OUTPATIENT
Start: 2019-03-09 | End: 2019-03-09

## 2019-03-09 RX ORDER — METOPROLOL SUCCINATE 25 MG/1
25 TABLET, EXTENDED RELEASE ORAL DAILY
Status: DISCONTINUED | OUTPATIENT
Start: 2019-03-09 | End: 2019-03-09

## 2019-03-09 RX ORDER — METOPROLOL TARTRATE 1 MG/ML
5 INJECTION, SOLUTION INTRAVENOUS ONCE
Status: DISCONTINUED | OUTPATIENT
Start: 2019-03-09 | End: 2019-03-09

## 2019-03-09 RX ORDER — WARFARIN SODIUM 5 MG/1
10 TABLET ORAL
Status: COMPLETED | OUTPATIENT
Start: 2019-03-09 | End: 2019-03-09

## 2019-03-09 RX ORDER — METOPROLOL TARTRATE 1 MG/ML
5 INJECTION, SOLUTION INTRAVENOUS ONCE
Status: DISCONTINUED | OUTPATIENT
Start: 2019-03-09 | End: 2019-03-11

## 2019-03-09 RX ADMIN — CHLORHEXIDINE GLUCONATE 15 ML: 1.2 RINSE ORAL at 20:50

## 2019-03-09 RX ADMIN — ASPIRIN 81 MG: 81 TABLET, COATED ORAL at 08:38

## 2019-03-09 RX ADMIN — METOPROLOL SUCCINATE 25 MG: 25 TABLET, EXTENDED RELEASE ORAL at 16:23

## 2019-03-09 RX ADMIN — AMOXICILLIN AND CLAVULANATE POTASSIUM 1 TABLET: 875; 125 TABLET, FILM COATED ORAL at 12:48

## 2019-03-09 RX ADMIN — LEVOTHYROXINE SODIUM 175 MCG: 25 TABLET ORAL at 08:37

## 2019-03-09 RX ADMIN — AMOXICILLIN AND CLAVULANATE POTASSIUM 1 TABLET: 875; 125 TABLET, FILM COATED ORAL at 20:50

## 2019-03-09 RX ADMIN — WARFARIN SODIUM 10 MG: 5 TABLET ORAL at 19:10

## 2019-03-09 RX ADMIN — METOPROLOL SUCCINATE 25 MG: 25 TABLET, EXTENDED RELEASE ORAL at 08:38

## 2019-03-09 RX ADMIN — ATORVASTATIN CALCIUM 40 MG: 40 TABLET, FILM COATED ORAL at 20:50

## 2019-03-09 RX ADMIN — PIPERACILLIN AND TAZOBACTAM 4.5 G: 4; .5 INJECTION, POWDER, FOR SOLUTION INTRAVENOUS at 04:01

## 2019-03-09 RX ADMIN — CHLORHEXIDINE GLUCONATE 15 ML: 1.2 RINSE ORAL at 08:38

## 2019-03-09 ASSESSMENT — MIFFLIN-ST. JEOR: SCORE: 2507.13

## 2019-03-09 ASSESSMENT — ACTIVITIES OF DAILY LIVING (ADL)
ADLS_ACUITY_SCORE: 19
ADLS_ACUITY_SCORE: 20

## 2019-03-09 NOTE — PROGRESS NOTES
[facility], [place]  Progress Note - Maroon 1 Service   Date of Admission:  3/6/2019    Assessment & Plan   Clarke Moreira is a 68 year old male with PMH of afib on warfarin, HFpEF, T2DM, and recent dental work (2/26/19) who is admitted on 3/6/2019 with severe sepsis and MICHELLE. He in clinically stable, lactic acidosis improved following fluids. He was found to have possible bilateral great toe osteomyelitis as well as left maxiallary sinusitis.     Sever sepsis: Resolved  Recent dental procedures with tooth extraction 2/26/19:  Left Maxillary sinusitis: Sepsis resolved.  Suspect oral/sinusitus as source given timing and CT findings.  Will narrow ABX to augmentin per OMFS recs today.    - ECHO without vegitations  - blood cultures, NGTD  - MRSA nares negative  - OMFS consulted, appreciate recs   Stopped vanc/zosyn 3/6-3/9       Narrowing to PO Augmentin 3/9-present    Sneeze precautions   - Continue PTA peridex 15 mL BID swish and spit per dental     MICHELLE: Baseline creatinine 1.0.  Increased to 4.15 on admission, downtrending after fluids.  Improving after fluids.  Retroperitoneal US without hydro or obvious parenchymal changes.   - PRN bladder scan/cath  - Will restart PTA tamsulosin once BP firmly stabilized  - Continue to hold prior to admission diuretics for today, plan to restart soon    Diabetic foot ulcers:  Hx PAD:  Last VICK 9/24/17 with possible occlusion of right anterior tibial artery and dorsalis pedis.  Black eschar on b/l great toes on admission.  Last A1c 6.1, CRP 5.9, ESR 19, less likely osteomyelitis given nl labs.  At this time suspect that sepsis more likely 2/2 dental procedure as above.  S/p bedside debridement by podiatry.    - Daily dressing changes, keep clean/dry  - WBAT w/ protective footwear for transfers and PT  - Podiatry consulted, appreciate recs    Follow up in podiatry clinic 1-2 weeks after discharge     WBAT    Offloading boots    Supratherapeutic  INR:  Afib s/p ablation 2008 on warfarin:  Afib with RVR to 200s yesterday.  [id number] 4. INR 3.92 on admission.  - warfarin dosing per pharmacy  - Resuming PTA metoprolol      Ordered IV dose 3/9 given RVR  - Telemetry     HFpEF: Prior systolic heart failure, most recent echo 10/2018 with EF 55-60%. CXR with pulmonary venous congestion, improved when compared to 1/5/2018, trace left pleural effusion and mild bibasilar atelectasis. BNP elevated on admit 2,329. Troponin 0.00.  - Hold PTA torsemide 150 mg BID and spironolactone 25 mg daily,      Will plan to resume soon  - Up-titrating metoprolol succinate 50 mg daily 3/9  - PT/OT consults, strength and ADLs, and lymphedema  - Follows in core clinic     Prior CVA:  CT head, MRI/MRA brain with no acute infarct. Noted some leukoaraiosis, b/l occipital encephalomalacia likely prior infarct. Chronic infarct noted in cerebellum.  - Neurology consulted in ED, signed off 3/6/19    Left temple lesion:   Hx BCC: Site of prior skin biopsy/exisino.  Per patient has become chronic, never fully heals and bleeds easily.  Path from 2014 consistent with BCC.  Concerning for incomplete resection/marjolin's ulcer?    - Outpatient follow up with PCP or dermatology    Chronic Issues:  THONG:  CPAP used at home, patient declines CPAP here in hospital, does not like our masks  DMII:  Last A1c 6.1 on 2/26/19.  Hold PTA metformin in the setting of sepsis  Hypothyroidism: PTA levothyroxine  HTN: Holding PTA lisinopril, spironolactone, torsemide  Depression/anxiety: NTD     Diet: Consistent Carbohydrate Diet 2425-9998 Calories: Moderate Consistent CHO (4-6 CHO units/meal)    Fluids: IVF boluses PRN  Lines: PIV  DVT Prophylaxis: PTA Warfarin, pharmacy to dose  Bazzi Catheter: not present  Code Status: DNR/DNI  patient would like to discuss pressors if he needs this, if he is unable to discuss pressors because he is so altered/hypotensive, then he would NOT want pressors and the medical team  "should \"just let me go\"    Disposition Plan   Expected discharge: 2-3 days, recommended to prior living arrangement vs TCU once tolerating home meds and BPs/HRs stable  Entered: Jesús Olivas MD 03/09/2019, 10:51 AM     Jesús Olivas MD  Maria 1 Service  [facility], [place]  Pager: [phone]  Please see sticky note for cross cover information  ______________________________________________________________________    Interval History   Patient continues to feel improved. Mild rhinorrhea and left para nasal discomfort.   Denies fevers, chills, lightheadedness.  No chest pain and is not lightheaded with heart racing, but does have some shortness of breath with activity.  Reviewed plan, patient updated and questions answered at bedside.        Data reviewed today: I reviewed all medications, new labs and imaging results over the last 24 hours.    Physical Exam   Vital Signs: Temp: 98  F (36.7  C) Temp src: Oral BP: 123/67 Pulse: 107 Heart Rate: 107 Resp: 16 SpO2: 99 % O2 Device: Nasal cannula Oxygen Delivery: 1 LPM  Weight: 378 lbs 1.42 oz    GEN: Adult male, resting supine in bed. NAD  HEENT: PERRL, no conjunctival injection, anicteric.  No discharge visible at the nares  CV: Tachy, irregularly irregular, NMRG (heart sounds distant 2/2 body habitus). Warm, well-perfused extremities, unable to appreciate JVD  RESP: CTAB from the anteroir, normal WOB  GI: soft, non-tender, no masses or organomegaly appreciated.  Normal bowel sounds.  MSK: Right great toes with new dressing in place, C/D/I.  Left with decreased callous.  Bilateral legs with trace non pitting edema and chronic appearing coloration consistent with venous stasis.  Skin: Warm, dry. Left temple lesion with depressed center and raised borders with area of scabbing unchanged.    Neuro: Alert, CNs II-XII grossly intact. Sensation and motor function of extremities grossly intact.   Psych: Appropriate mood and " affect.    CMP  Recent Labs   Lab 03/09/19 0423 03/08/19 0452 03/07/19 0449 03/06/19  2339 03/06/19  1110   * 144 141 138 136   POTASSIUM 4.1 4.5 4.2 4.8 4.7   CHLORIDE 112* 109 105 106 99   CO2 27 27 25 20 24   ANIONGAP 6 9 12 12 13   * 116* 92 129* 104*   BUN 21 40* 58* 63* 64*   CR 1.51* 2.33* 3.50* 3.84* 4.15*   GFRESTIMATED 47* 28* 17* 15* 14*   GFRESTBLACK 54* 32* 20* 17* 16*   CHERI 8.4* 9.0 8.7 8.4* 9.6   MAG 1.7 1.9 1.9 1.7  --    PROTTOTAL  --   --   --   --  7.5   ALBUMIN  --   --   --   --  3.5   BILITOTAL  --   --   --   --  0.5   ALKPHOS  --   --   --   --  105   AST  --   --   --   --  14   ALT  --   --   --   --  18     CBC  Recent Labs   Lab 03/09/19 0423 03/08/19 0452 03/07/19 0449 03/06/19  2339   WBC 6.5 6.4 10.0 11.4*   RBC 4.30* 4.58 4.50 4.25*   HGB 13.3 14.3 13.9 13.2*   HCT 41.7 44.2 43.4 42.0   MCV 97 97 96 99   MCH 30.9 31.2 30.9 31.1   MCHC 31.9 32.4 32.0 31.4*   RDW 13.2 13.2 13.2 13.2    199 212 231     INR  Recent Labs   Lab 03/09/19 0423 03/08/19 0452 03/07/19 0449 03/06/19  1110   INR 2.01* 3.03* 4.39* 3.92*     Arterial Blood GasNo lab results found in last 7 days.

## 2019-03-09 NOTE — PHARMACY-ANTICOAGULATION SERVICE
Clinical Pharmacy - Warfarin Dosing Consult     Pharmacy has been consulted to manage this patient s warfarin therapy.  Indication: Atrial Fibrillation  Therapy Goal: INR 2-3  Provider/Team: Maria Gallego  Warfarin Prior to Admission: Yes  Warfarin PTA Regimen: 12.5 mg anna except 10 mg on Sun.    INR   Date Value Ref Range Status   03/09/2019 2.01 (H) 0.86 - 1.14 Final   03/08/2019 3.03 (H) 0.86 - 1.14 Final       Recommend warfarin 10 mg today.  Pharmacy will monitor lCarke RENÉE Harish daily and order warfarin doses to achieve specified goal.      Please contact pharmacy as soon as possible if the warfarin needs to be held for a procedure or if the warfarin goals change.

## 2019-03-09 NOTE — PLAN OF CARE
OT/6B - Discharge Planner OT   Patient plan for discharge: hopeful for home next week  Current status: Min A for bed mobility supine to sitting EOB, mod A for bed mobility sitting to supine. Implemented reacher for LB dressing including shorts and diabetic/offloading shoe; Mod I for shorts and min A for shoes. HR ranging from 100-211 bpm during session, /68, spO2 97% on RA.  Barriers to return to prior living situation: activity tolerance, lives alone, assist required for ADL/IADL  Recommendations for discharge: TCU  Rationale for recommendations: Pending LOS, pt may progress to home with home therapy to address remaining activity tolerance and equipment needs. At this time pt would benefit from continued skilled inpatient therapy.          Entered by: Sherrie Day 03/09/2019 10:38 AM

## 2019-03-09 NOTE — PLAN OF CARE
"Neuro: A&Ox4.   Cardiac: Afib. HR  at rest. -180 with activity. BP stable. Afebrile. /85   Pulse 107   Temp 97.7  F (36.5  C) (Oral)   Resp 16   Ht 1.803 m (5' 11\")   Wt (!) 171.5 kg (378 lb 1.4 oz)   SpO2 100%   BMI 52.73 kg/m      Respiratory: Sating >95%  on 1L NC.  GI/: Adequate urine output via urinal.  BM X1.  Diet/appetite: Tolerating reg diet.   Activity:  Assist of 2-3 with walker, up to bedside commode. HR up to 180 with stand and pivot to bedside commode. Denies dizziness. /85. HR back to 102 after movement.  Pain: Denies.  Skin: No new deficits noted. See Flowsheets.  LDA's:PIV (2)- SL    Plan: Continue with POC. Notify primary team with changes.       Improving  Adult Inpatient Plan of Care  Patient-Specific Goal (Individualization)  3/9/2019 0557 - Improving by Lucy Olson RN  Optimal Comfort and Wellbeing  3/9/2019 0557 - Improving by Lucy Olson RN  Diabetes Comorbidity  Blood Glucose Level Within Desired Range  3/9/2019 0557 - Improving by Lucy Olson RN  Hypertension Comorbidity  Blood Pressure in Desired Range  3/9/2019 0557 - Improving by Lucy Olson, RN     "

## 2019-03-09 NOTE — PROGRESS NOTES
[facility], [place]    Progress Note - Maroon 1 Service        Date of Admission:  3/6/2019    Assessment & Plan   Clarke Moreira is a 68 year old male with PMH of afib on warfarin, HFpEF, T2DM, and recent dental work (2/26/19) who is admitted on 3/6/2019 with severe sepsis and MICHELLE. He in clinically stable, lactic acidosis improved following fluids. He was found to have possible bilateral great toe osteomyelitis as well as left maxiallary sinusitis.     Changes Today:  -- Podiatry consulted -> bedside tissue debridement, callus shaving, toenails cut, tyloma trimmed -> no additional interventions planned  -- Orthotics consult placed, patient fitted with offloaded shoe  -- Will have OT replace lymphedema wraps  -- OMFS consulted -> sinus precautions, Augmentin, Chlorhexidine, saline nasal sprays  -- LR 500cc given this am for tachycardia and hypotension with using commode, good response  -- Continue IV Vanc/Zosyn overnight, if blood cultures negative 3/9 consider narrowing to PO Augmentin per OMFS recs     Sever sepsis: Resolving  Recent dental procedures with tooth extraction 2/26/19:  Left Maxillary sinusitis: Tachypnea, tachycardia, lactic acid 2.8 on admit, and new MICHELLE.  S/p molar tooth extraction 2/26/19 with dry socket, was not on abx prior to admission. Recently reported URI with cough and chills but no fevers, has been improving.  Initial concern for bone/soft tissue source given possible bilateral great toe osteomyelitis.  However at this time suspect that sepsis more likely 2/2 sinusitis/recent dental extraction.  Splinter hemorrhages mildly concerning for infective endocarditis, however given low suspicion will monitor labs and order ECHO.  Lactate and WBC normalized.  CRP not elevated.  BPs remain soft.   - CT neck concerning for cellulitis, sinusitis near wisdom teeth extraction  - Follow up blood cultures, no growth as of 3/8   - Continuing vanc/zosyn for now, MRSA  nares negative   -- Continue IV Vanc/Zosyn overnight, if blood cultures negative 3/9 consider narrowing to PO Augmentin per OMFS recs - OMFS consulted, appreciate recs  - Continue PTA peridex 15 mL BID swish and spit per dental  - PT/OT consults, strength and ADLs  -- LR 500cc given this am 3/8 for tachycardia and hypotension with using commode, good response     MICHELLE: Baseline creatinine 1.0.  Increased to 4.15 on admission, downtrending after fluids.  UA with 1 hylaine cast, otherwise bland. Making good urine. H/o BPH with retention at times. Has had decreased PO intake over past week. Suspect prerenal with decreased PO intake and hypotension with sepsis. Improving after fluids.  Retroperitoneal US without hydro or obvious parenchymal changes.   - PRN bladder scan/cath  - Will restart PTA tamsulosin once BP firmly stabilized  - Continue to hold prior to admission diuretics while renal function normalizes,will add back gently prior to discharge    Diabetic foot ulcers:  Hx PAD:  Concern for osteomyelitis of bilateral great toes: Last VICK 9/24/17 with possible occlusion of right anterior tibial artery and dorsalis pedisBlack eschar on b/l great toes, first noticed 3/4, last noted normal 10 days ago, was seen by [facility] podiatry 1/15/19 for sharp debridement of L hallux. XR imaging with irregularity of the tuft of the distal first phalanx indicating possible osteomyelitis.  Last A1c 6.1, CRP 5.9, ESR 19, less likely osteomyelitis given nl labs.  At this time suspect that sepsis more likely 2/2 dental procedure as above.    - Daily dressing changes, keep clean/dry  - WBAT w/ protective footwear for transfers and PT  - Podiatry consulted, appreciate recs  - ABX as above    Supratherapeutic INR:  Afib s/p ablation 2008 on warfarin:  In afib rate low 100s on exam. [id number] 4. INR 3.92 on admission.  - warfarin dosing per pharmacy  - Hold PTA metoprolol for now with low BP, will plan to resume as able  -  "Telemetry     HFpEF: Prior systolic heart failure, most recent echo 10/2018 with EF 55-60%. CXR with pulmonary venous congestion, improved when compared to 1/5/2018, trace left pleural effusion and mild bibasilar atelectasis. BNP elevated on admit 2,329. Troponin 0.00.  - Hold PTA torsemide 150 mg BID and spironolactone 25 mg daily, metoprolol succinate 50 mg daily   - CXR with trace pleural effusion, bibasilar atelectasis  - Fluids with care  - Follows in core clinic     Prior CVA:  CT head, MRI/MRA brain with no acute infarct. Noted some leukoaraiosis, b/l occipital encephalomalacia likely prior infarct. Chronic infarct noted in cerebellum.  - Neurology consulted in ED, signed off 3/6/19    Left temple lesion:   Hx BCC: Site of prior skin biopsy/exisino.  Per patient has become chronic, never fully heals and bleeds easily.  Path from 2014 consistent with BCC.  Concerning for incomplete resection/marjolin's ulcer?    - Outpatient follow up with PCP or dermatology    Chronic Issues:  THONG:  CPAP used at home, patient declines CPAP here in hospital, does not like our masks  DMII:  Last A1c 6.1 on 2/26/19.  Hold PTA metformin in the setting of sepsis  Hypothyroidism: PTA levothyroxine  HTN: Holding PTA lisinopril, spironolactone, torsemide, metoprolol as above  Depression/anxiety: NTD     Diet: Consistent Carbohydrate Diet 5179-7716 Calories: Moderate Consistent CHO (4-6 CHO units/meal)    Fluids: IVF boluses PRN  Lines: PIV  DVT Prophylaxis: PTA Warfarin, pharmacy to dose  Bazzi Catheter: not present  Code Status: DNR/DNI  patient would like to discuss pressors if he needs this, if he is unable to discuss pressors because he is so altered/hypotensive, then he would NOT want pressors and the medical team should \"just let me go\"    Disposition Plan   Expected discharge: 4 - 7 days, recommended to prior living arrangement once SIRS/Sepsis treated.  Entered: Cathleen Kennedy MD 03/08/2019, 6:04 PM       Cathleen Kennedy, "  MD Sif[place] 1 Service  [facility], [place]  Pager: [phone]  Please see sticky note for cross cover information  ______________________________________________________________________    Interval History   Patient continues to feel improved. Appreciative of podiatry interventions at bedside yesterday. Continues to have some mild left sided facial tendernes. Dnies fevers, chills, lightheadedness. Feels very tired, weak, and deconditioned. Did have episode when moved to bedside commode where HR approached 200 and BP low on check. Normalized when patient returned to bed and rested.       Data reviewed today: I reviewed all medications, new labs and imaging results over the last 24 hours.    Physical Exam   Vital Signs: Temp: 98.1  F (36.7  C) Temp src: Oral BP: 94/61 Pulse: 107 Heart Rate: 91 Resp: 16 SpO2: 100 % O2 Device: Nasal cannula Oxygen Delivery: 2 LPM  Weight: 372 lbs 5.71 oz    GEN: Adult male, resting supine in bed. NAD  HEENT: PERRL, no conjunctival injection, anicteric, tooth extraction site without bleeding, minimal purulent exudate and erythema, Neck supple without meningismus, no anterior cervical or supraclavicular LAD, wearing sunglasses, does have tto along maxillary sinus on left side   CV: Tachy, irregularly irregular, NMRG (heart sounds distant 2/2 body habitus). Warm, well-perfused extremities, unable to appreciate JVD  RESP: CTAB, normal WOB  GI: soft, non-tender, pannus with clean/dry interiginous area, no masses or organomegaly appreciated.  Normal bowel sounds.  MSK: Chronic appearing wounds bilaterally on the tips of the great toes and left third digit, dry with black eschar and no proximal erythema.  Bilateral legs with trace non pitting edema and chronic appearing coloration consistent with venous stasis.  Skin: Warm, dry. Left temple lesion with depressed center and raised borders with area of scabbing.    Neuro: Alert, CNs II-XII grossly intact. Sensation and  motor function of extremities grossly intact.  No clonus or hypperreflexia  Psych: Appropriate mood and affect.    CMP  Recent Labs   Lab 03/08/19 0452 03/07/19 0449 03/06/19 2339 03/06/19  1110    141 138 136   POTASSIUM 4.5 4.2 4.8 4.7   CHLORIDE 109 105 106 99   CO2 27 25 20 24   ANIONGAP 9 12 12 13   * 92 129* 104*   BUN 40* 58* 63* 64*   CR 2.33* 3.50* 3.84* 4.15*   GFRESTIMATED 28* 17* 15* 14*   GFRESTBLACK 32* 20* 17* 16*   CHERI 9.0 8.7 8.4* 9.6   MAG 1.9 1.9 1.7  --    PROTTOTAL  --   --   --  7.5   ALBUMIN  --   --   --  3.5   BILITOTAL  --   --   --  0.5   ALKPHOS  --   --   --  105   AST  --   --   --  14   ALT  --   --   --  18     CBC  Recent Labs   Lab 03/08/19 0452 03/07/19 0449 03/06/19 2339 03/06/19  1110   WBC 6.4 10.0 11.4* 11.9*   RBC 4.58 4.50 4.25* 4.80   HGB 14.3 13.9 13.2* 14.8   HCT 44.2 43.4 42.0 45.1   MCV 97 96 99 94   MCH 31.2 30.9 31.1 30.8   MCHC 32.4 32.0 31.4* 32.8   RDW 13.2 13.2 13.2 13.0    212 231 284     INR  Recent Labs   Lab 03/08/19 0452 03/07/19 0449 03/06/19  1110   INR 3.03* 4.39* 3.92*     Arterial Blood GasNo lab results found in last 7 days.

## 2019-03-10 ENCOUNTER — APPOINTMENT (OUTPATIENT)
Dept: OCCUPATIONAL THERAPY | Facility: CLINIC | Age: 69
DRG: 856 | End: 2019-03-10
Payer: COMMERCIAL

## 2019-03-10 LAB
ANION GAP SERPL CALCULATED.3IONS-SCNC: 6 MMOL/L (ref 3–14)
BUN SERPL-MCNC: 13 MG/DL (ref 7–30)
CALCIUM SERPL-MCNC: 8.9 MG/DL (ref 8.5–10.1)
CHLORIDE SERPL-SCNC: 112 MMOL/L (ref 94–109)
CO2 SERPL-SCNC: 24 MMOL/L (ref 20–32)
CREAT SERPL-MCNC: 1.08 MG/DL (ref 0.66–1.25)
ERYTHROCYTE [DISTWIDTH] IN BLOOD BY AUTOMATED COUNT: 13.2 % (ref 10–15)
GFR SERPL CREATININE-BSD FRML MDRD: 70 ML/MIN/{1.73_M2}
GLUCOSE BLDC GLUCOMTR-MCNC: 100 MG/DL (ref 70–99)
GLUCOSE BLDC GLUCOMTR-MCNC: 100 MG/DL (ref 70–99)
GLUCOSE BLDC GLUCOMTR-MCNC: 104 MG/DL (ref 70–99)
GLUCOSE SERPL-MCNC: 117 MG/DL (ref 70–99)
HCT VFR BLD AUTO: 41.2 % (ref 40–53)
HGB BLD-MCNC: 13 G/DL (ref 13.3–17.7)
INR PPP: 1.41 (ref 0.86–1.14)
MAGNESIUM SERPL-MCNC: 1.9 MG/DL (ref 1.6–2.3)
MCH RBC QN AUTO: 30.7 PG (ref 26.5–33)
MCHC RBC AUTO-ENTMCNC: 31.6 G/DL (ref 31.5–36.5)
MCV RBC AUTO: 97 FL (ref 78–100)
PLATELET # BLD AUTO: 206 10E9/L (ref 150–450)
POTASSIUM SERPL-SCNC: 3.2 MMOL/L (ref 3.4–5.3)
POTASSIUM SERPL-SCNC: 3.8 MMOL/L (ref 3.4–5.3)
RBC # BLD AUTO: 4.23 10E12/L (ref 4.4–5.9)
SODIUM SERPL-SCNC: 143 MMOL/L (ref 133–144)
WBC # BLD AUTO: 7 10E9/L (ref 4–11)

## 2019-03-10 PROCEDURE — 83735 ASSAY OF MAGNESIUM: CPT | Performed by: STUDENT IN AN ORGANIZED HEALTH CARE EDUCATION/TRAINING PROGRAM

## 2019-03-10 PROCEDURE — 25000132 ZZH RX MED GY IP 250 OP 250 PS 637: Performed by: STUDENT IN AN ORGANIZED HEALTH CARE EDUCATION/TRAINING PROGRAM

## 2019-03-10 PROCEDURE — 36592 COLLECT BLOOD FROM PICC: CPT | Performed by: STUDENT IN AN ORGANIZED HEALTH CARE EDUCATION/TRAINING PROGRAM

## 2019-03-10 PROCEDURE — 36415 COLL VENOUS BLD VENIPUNCTURE: CPT

## 2019-03-10 PROCEDURE — 00000146 ZZHCL STATISTIC GLUCOSE BY METER IP

## 2019-03-10 PROCEDURE — 25000132 ZZH RX MED GY IP 250 OP 250 PS 637

## 2019-03-10 PROCEDURE — 85027 COMPLETE CBC AUTOMATED: CPT | Performed by: STUDENT IN AN ORGANIZED HEALTH CARE EDUCATION/TRAINING PROGRAM

## 2019-03-10 PROCEDURE — 85610 PROTHROMBIN TIME: CPT | Performed by: STUDENT IN AN ORGANIZED HEALTH CARE EDUCATION/TRAINING PROGRAM

## 2019-03-10 PROCEDURE — 99233 SBSQ HOSP IP/OBS HIGH 50: CPT | Mod: GC | Performed by: INTERNAL MEDICINE

## 2019-03-10 PROCEDURE — 84132 ASSAY OF SERUM POTASSIUM: CPT

## 2019-03-10 PROCEDURE — 97535 SELF CARE MNGMENT TRAINING: CPT | Mod: GO

## 2019-03-10 PROCEDURE — 12000004 ZZH R&B IMCU UMMC

## 2019-03-10 PROCEDURE — 25000132 ZZH RX MED GY IP 250 OP 250 PS 637: Performed by: INTERNAL MEDICINE

## 2019-03-10 PROCEDURE — 80048 BASIC METABOLIC PNL TOTAL CA: CPT | Performed by: STUDENT IN AN ORGANIZED HEALTH CARE EDUCATION/TRAINING PROGRAM

## 2019-03-10 PROCEDURE — 97165 OT EVAL LOW COMPLEX 30 MIN: CPT | Mod: GO

## 2019-03-10 RX ORDER — TAMSULOSIN HYDROCHLORIDE 0.4 MG/1
0.4 CAPSULE ORAL DAILY
Status: DISCONTINUED | OUTPATIENT
Start: 2019-03-10 | End: 2019-03-22 | Stop reason: HOSPADM

## 2019-03-10 RX ORDER — METOPROLOL SUCCINATE 25 MG/1
25 TABLET, EXTENDED RELEASE ORAL ONCE
Status: COMPLETED | OUTPATIENT
Start: 2019-03-10 | End: 2019-03-10

## 2019-03-10 RX ORDER — POTASSIUM CHLORIDE 7.45 MG/ML
10 INJECTION INTRAVENOUS
Status: DISCONTINUED | OUTPATIENT
Start: 2019-03-10 | End: 2019-03-11

## 2019-03-10 RX ORDER — POTASSIUM CHLORIDE 1.5 G/1.58G
20-40 POWDER, FOR SOLUTION ORAL
Status: DISCONTINUED | OUTPATIENT
Start: 2019-03-10 | End: 2019-03-11

## 2019-03-10 RX ORDER — POTASSIUM CHLORIDE 750 MG/1
20-40 TABLET, EXTENDED RELEASE ORAL
Status: DISCONTINUED | OUTPATIENT
Start: 2019-03-10 | End: 2019-03-11

## 2019-03-10 RX ORDER — POTASSIUM CHLORIDE 29.8 MG/ML
20 INJECTION INTRAVENOUS
Status: DISCONTINUED | OUTPATIENT
Start: 2019-03-10 | End: 2019-03-11

## 2019-03-10 RX ORDER — LISINOPRIL 2.5 MG/1
2.5 TABLET ORAL DAILY
Status: DISCONTINUED | OUTPATIENT
Start: 2019-03-10 | End: 2019-03-11

## 2019-03-10 RX ORDER — MAGNESIUM SULFATE HEPTAHYDRATE 40 MG/ML
4 INJECTION, SOLUTION INTRAVENOUS EVERY 4 HOURS PRN
Status: DISCONTINUED | OUTPATIENT
Start: 2019-03-10 | End: 2019-03-22 | Stop reason: HOSPADM

## 2019-03-10 RX ORDER — POTASSIUM CL/LIDO/0.9 % NACL 10MEQ/0.1L
10 INTRAVENOUS SOLUTION, PIGGYBACK (ML) INTRAVENOUS
Status: DISCONTINUED | OUTPATIENT
Start: 2019-03-10 | End: 2019-03-11

## 2019-03-10 RX ORDER — TORSEMIDE 100 MG/1
100 TABLET ORAL
Status: DISCONTINUED | OUTPATIENT
Start: 2019-03-11 | End: 2019-03-13

## 2019-03-10 RX ORDER — TORSEMIDE 100 MG/1
100 TABLET ORAL DAILY
Status: DISCONTINUED | OUTPATIENT
Start: 2019-03-10 | End: 2019-03-10

## 2019-03-10 RX ORDER — SPIRONOLACTONE 25 MG
12.5 TABLET ORAL DAILY
Status: DISCONTINUED | OUTPATIENT
Start: 2019-03-10 | End: 2019-03-10

## 2019-03-10 RX ADMIN — TORSEMIDE 100 MG: 100 TABLET ORAL at 13:54

## 2019-03-10 RX ADMIN — POTASSIUM CHLORIDE 40 MEQ: 750 TABLET, EXTENDED RELEASE ORAL at 21:29

## 2019-03-10 RX ADMIN — AMOXICILLIN AND CLAVULANATE POTASSIUM 1 TABLET: 875; 125 TABLET, FILM COATED ORAL at 20:51

## 2019-03-10 RX ADMIN — LEVOTHYROXINE SODIUM 175 MCG: 25 TABLET ORAL at 08:47

## 2019-03-10 RX ADMIN — Medication 12.5 MG: at 13:53

## 2019-03-10 RX ADMIN — LISINOPRIL 2.5 MG: 2.5 TABLET ORAL at 13:55

## 2019-03-10 RX ADMIN — ATORVASTATIN CALCIUM 40 MG: 40 TABLET, FILM COATED ORAL at 20:51

## 2019-03-10 RX ADMIN — POTASSIUM CHLORIDE 20 MEQ: 750 TABLET, EXTENDED RELEASE ORAL at 23:17

## 2019-03-10 RX ADMIN — WARFARIN SODIUM 12.5 MG: 2.5 TABLET ORAL at 17:49

## 2019-03-10 RX ADMIN — METOPROLOL SUCCINATE 50 MG: 25 TABLET, EXTENDED RELEASE ORAL at 08:47

## 2019-03-10 RX ADMIN — CHLORHEXIDINE GLUCONATE 15 ML: 1.2 RINSE ORAL at 20:51

## 2019-03-10 RX ADMIN — METOPROLOL SUCCINATE 25 MG: 25 TABLET, EXTENDED RELEASE ORAL at 13:54

## 2019-03-10 RX ADMIN — CHLORHEXIDINE GLUCONATE 15 ML: 1.2 RINSE ORAL at 08:47

## 2019-03-10 RX ADMIN — ASPIRIN 81 MG: 81 TABLET, COATED ORAL at 08:47

## 2019-03-10 RX ADMIN — AMOXICILLIN AND CLAVULANATE POTASSIUM 1 TABLET: 875; 125 TABLET, FILM COATED ORAL at 08:47

## 2019-03-10 ASSESSMENT — MIFFLIN-ST. JEOR: SCORE: 2512.13

## 2019-03-10 ASSESSMENT — ACTIVITIES OF DAILY LIVING (ADL)
ADLS_ACUITY_SCORE: 18
ADLS_ACUITY_SCORE: 20
ADLS_ACUITY_SCORE: 20
ADLS_ACUITY_SCORE: 18
ADLS_ACUITY_SCORE: 20
ADLS_ACUITY_SCORE: 20

## 2019-03-10 ASSESSMENT — PAIN DESCRIPTION - DESCRIPTORS: DESCRIPTORS: OTHER (COMMENT)

## 2019-03-10 NOTE — PROGRESS NOTES
[facility], [place]  Progress Note - [facility] 1 Service   Date of Admission:  3/6/2019    Assessment & Plan   Clarke Moreira is a 68 year old male with PMH of afib on warfarin, HFpEF, T2DM, and recent dental work (2/26/19) who is admitted on 3/6/2019 with severe sepsis and MICHELLE. Likely 2/2 sinusitis and possible contribution of excessive diuresis/poor PO intake.      Sever sepsis: Resolved  Recent dental procedures with tooth extraction 2/26/19:  Left Maxillary sinusitis: Sepsis resolved.  Suspect oral/sinusitus as source given timing and CT findings.  Will narrow ABX to augmentin per OMFS recs today.    - ECHO without vegitations  - blood cultures, NGTD  - MRSA nares negative  - OMFS consulted, appreciate recs   Stopped vanc/zosyn 3/6-3/9       PO Augmentin 3/9-12    Sinus precautions for 6 weeks    Follow up with dentist as needed     Chlorhexidine BID 10 days    Saline nasal spray PNR     MICHELLE: Baseline creatinine 1.0.  Increased to 4.15 on admission, downtrending after fluids. Retroperitoneal US without hydro or obvious parenchymal changes. Now back to baseline.  Up titrating home meds  - PRN bladder scan/cath  - Restarted PTA tamsulosin    Diabetic foot ulcers:  Hx PAD:  Last VICK 9/24/17 with possible occlusion of right anterior tibial artery and dorsalis pedis.  Black eschar on b/l great toes on admission.  Last A1c 6.1, CRP 5.9, ESR 19, less likely osteomyelitis given nl labs.  At this time suspect that sepsis more likely 2/2 dental procedure as above.  S/p bedside debridement by podiatry.    - Daily dressing changes, keep clean/dry  - WBAT w/ protective footwear for transfers and PT  - Podiatry consulted, appreciate recs    Follow up in podiatry clinic 1-2 weeks after discharge     WBAT    Offloading boots     HFpEF:   HTN:   Supratherapeutic INR:  Afib s/p ablation 2008 on warfarin: Prior systolic heart failure, most recent echo 10/2018 with EF 55-60%. CXR with pulmonary  "venous congestion, improved when compared to 1/5/2018, trace left pleural effusion and mild bibasilar atelectasis. BNP elevated on admit 2,329. Troponin 0.00.  Afib with RVR to 200s 3/8.  [id number] 4. INR 3.92 on admission.  - warfarin dosing per pharmacy  - Increased PTA metoprolol succinate to 75mg   - Resumed PTA torsemide (reduced dose of 100mg) 150 mg BID   - Holding spironolactone 25 mg daily  - Resumed PTA lisinopril  - Holding PTA potassium.  Replete as needed per protocol    - PT/OT consults, strength and ADLs, and lymphedema  - Telemetry  - Follows in core clinic     Prior CVA:  CT head, MRI/MRA brain with no acute infarct. Noted some leukoaraiosis, b/l occipital encephalomalacia likely prior infarct. Chronic infarct noted in cerebellum.  - Neurology consulted in ED, signed off 3/6/19    Left temple lesion:   Hx BCC: Site of prior skin biopsy/exisino.  Per patient has become chronic, never fully heals and bleeds easily.  Path from 2014 consistent with BCC.  Concerning for incomplete resection/marjolin's ulcer?    - Outpatient follow up with PCP or dermatology    Chronic Issues:  THONG:  CPAP used at home, patient declines CPAP here in hospital, does not like our masks  DMII:  Last A1c 6.1 on 2/26/19.  Will plan to resume PTA metformin at discharge  Hypothyroidism: PTA levothyroxine  Depression/anxiety: NTD     Diet: Consistent Carbohydrate Diet 2961-0081 Calories: Moderate Consistent CHO (4-6 CHO units/meal)    Fluids: IVF boluses PRN  Lines: PIV  DVT Prophylaxis: PTA Warfarin, pharmacy to dose  Bazzi Catheter: not present  Code Status: DNR/DNI  patient would like to discuss pressors if he needs this, if he is unable to discuss pressors because he is so altered/hypotensive, then he would NOT want pressors and the medical team should \"just let me go\"    Disposition Plan   Expected discharge: 2-3 days, recommended to prior living arrangement vs TCU once tolerating home meds and BPs/HRs stable, pending further " PT/OT recs  Entered: Jesús Olivas MD 03/10/2019, 5:18 PM     Jesús Olivas MD  [facility] 1 Service  [facility], [place]  Pager: [phone]  Please see sticky note for cross cover information  ______________________________________________________________________    Interval History   Patient continues to feel improved. Mild rhinorrhea and left para nasal discomfort continues.  Denies fevers, chills, lightheadedness.  No chest pain or presyncope.    Reviewed plan, patient updated and questions answered at bedside.        Data reviewed today: I reviewed all medications, new labs and imaging results over the last 24 hours.    Physical Exam   Vital Signs: Temp: 97.1  F (36.2  C) Temp src: Axillary BP: 119/69 Pulse: 91 Heart Rate: 89 Resp: 20 SpO2: 99 % O2 Device: None (Room air) Oxygen Delivery: 1 LPM  Weight: 379 lbs 3.06 oz    GEN: Adult male, resting supine in bed. NAD  HEENT: PERRL, no conjunctival injection, anicteric.  No discharge visible at the nares, left paranasal tenderness to palpation   CV: Tachy, irregularly irregular, NMRG. Warm, well-perfused extremities, unable to appreciate JVD  RESP: CTAB from the anterior, normal WOB  GI: soft, non-tender, no masses or organomegaly appreciated.  Normal bowel sounds.  MSK: Right great toes with new dressing in place, C/D/I.  Left with decreased callous.  Bilateral legs with trace non pitting edema  Skin: Warm, dry. Left temple lesion with depressed center and raised borders with area of scabbing unchanged.    Neuro: Alert, CNs II-XII grossly intact. Sensation and motor function of extremities grossly intact.   Psych: Appropriate mood and affect.    CMP  Recent Labs   Lab 03/10/19  0520 03/09/19  0423 03/08/19  0452 03/07/19  0449  03/06/19  1110    145* 144 141   < > 136   POTASSIUM 3.8 4.1 4.5 4.2   < > 4.7   CHLORIDE 112* 112* 109 105   < > 99   CO2 24 27 27 25   < > 24   ANIONGAP 6 6 9 12   < > 13   * 106* 116* 92   < >  104*   BUN 13 21 40* 58*   < > 64*   CR 1.08 1.51* 2.33* 3.50*   < > 4.15*   GFRESTIMATED 70 47* 28* 17*   < > 14*   GFRESTBLACK 81 54* 32* 20*   < > 16*   CHERI 8.9 8.4* 9.0 8.7   < > 9.6   MAG 1.9 1.7 1.9 1.9   < >  --    PROTTOTAL  --   --   --   --   --  7.5   ALBUMIN  --   --   --   --   --  3.5   BILITOTAL  --   --   --   --   --  0.5   ALKPHOS  --   --   --   --   --  105   AST  --   --   --   --   --  14   ALT  --   --   --   --   --  18    < > = values in this interval not displayed.     CBC  Recent Labs   Lab 03/10/19  0520 03/09/19  0423 03/08/19  0452 03/07/19  0449   WBC 7.0 6.5 6.4 10.0   RBC 4.23* 4.30* 4.58 4.50   HGB 13.0* 13.3 14.3 13.9   HCT 41.2 41.7 44.2 43.4   MCV 97 97 97 96   MCH 30.7 30.9 31.2 30.9   MCHC 31.6 31.9 32.4 32.0   RDW 13.2 13.2 13.2 13.2    195 199 212     INR  Recent Labs   Lab 03/10/19  0520 03/09/19 0423 03/08/19 0452 03/07/19 0449   INR 1.41* 2.01* 3.03* 4.39*

## 2019-03-10 NOTE — PLAN OF CARE
Neuro: A&Ox4.   Cardiac: Afib. HR 's at rest, restarted Metoprolol. -180 with activity. BP stable. Afebrile.            Respiratory: Sating >95%  on 1L NC.  GI/: Adequate urine output via urinal.  BM X1.  Diet/appetite: Tolerating reg diet.   Activity:  Assist x1-2 to log-roll, 2-3 with walker. HR up to 150-180 working with OT & log roll side to side, asymptomatic/denies dizziness.  Pain: Denies.  Skin/LDA's: No new deficits noted. Right great toe dressing changed. PIV X2, saline locked, transitioned to PO antibiotics.     Plan: Continue with POC. Notify primary team with changes.

## 2019-03-10 NOTE — PROGRESS NOTES
03/10/19 1200   Rehab Discipline   Discipline OT   Type of Visit   Type of visit Initial Edema Evaluation   General Information   Start of care 03/10/19   Referring physician dinorah Olivas   Orders Evaluate and treat as indicated   Order date 03/09/19   Medical diagnosis lymphedema   Onset of illness / date of surgery 03/09/19   Edema onset 03/09/19  (chronic)   Affected body parts LLE;RLE   Edema etiology Infection  (cardiac edema; inactivity)   Edema etiology comments Pt has a cardiac history indicating decline in functiona and has physcial limitations 2/2 medial state and body habitus limiting daily activity.   Pertinent history of current problem (PT: include personal factors and/or comorbidities that impact the POC; OT: include additional occupational profile info) Clarke Moreira is a 68 year old male with PMH of afib on warfarin, HFpEF, T2DM, and recent dental work (2/26/19) who is admitted on 3/6/2019 with severe sepsis and MICHELLE. He in clinically stable, lactic acidosis improved following fluids. He was found to have possible bilateral great toe osteomyelitis as well as left maxiallary sinusitis.    Surgical / medical history reviewed Yes   Prior level of functional mobility Mod I   Prior treatment Compression garments;Elevation;Gradient compression bandaging   Community support Home health aid   Patient role / employment history Disabled   Living environment Apartment / condo   Living environment comments pt has in home nurse a few times weekly   Current assistive devices Walker;Front wheeled walker  (sock aide reported)   Fall Risk Screen   Fall screen comments no falls reported   Abuse Screen (yes response referral indicated)   Feels Unsafe at Home or Work/School no   Feels Threatened by Someone no   Does Anyone Try to Keep You From Having Contact with Others or Doing Things Outside Your Home? no   Physical Signs of Abuse Present no   Subjective Report   Patient report of symptoms pt familiar  to lymphedema services and compression use   Patient / Family Goals   Patient / family goals statement to properly manage BLE lymphedema   Pain   Patient currently in pain No   Cognitive Status   Orientation Orientation to person, place and time   Level of consciousness Alert   Follows commands and answers questions 100% of the time   Personal safety and judgement Intact   Edema Exam / Assessment   Skin condition Pitting;Non-pitting   Skin condition comments mild 1+ pitting dorsal feet, otherwise non pitting mild edema ankle-knee crease   Pitting 1+   Pitting location BLE's   Capillary refill Symmetrical   Dorsal pedal pulse comments unable to palpate-no signs ischemia   Stemmer sign Negative   Girth Measurements   Girth Measurements (non taken; no treatment beyond today)   Range of Motion   ROM comments WFL   Strength   Strength comments NT'd   Activities of Daily Living   Activities of Daily Living I-Mod A  (bathing)   Transfers   Transfers I-Mod I   Gait / Locomotion   Gait / Locomotion Mod I   Coordination   Coordination Gross motor coordination appropriate   Muscle Tone   Muscle tone No deficits were identified   Planned Edema Interventions   Planned edema interventions Exercises;Education   Clinical Impression   Criteria for skilled therapeutic intervention met Yes   Therapy diagnosis lymphedema   Influenced by the following impairments / conditions Stage 1   Assessment of Occupational Performance 1-3 Performance Deficits   Identified Performance Deficits decreased I with transfer; decreased functional ambulation; decreased I with LB cares   Clinical Decision Making (Complexity) Low complexity   Treatment frequency (1x treat)   Treatment duration 3/10/19   Patient / family and/or staff in agreement with plan of care Yes   Risks and benefits of therapy have been explained Yes   Clinical impression comments Pt will benefit from 1x lymphedema tretment to establish management for time in hosputal with pt personal  compression, receive education on OP services to provide optimal management once home and managing independantly.

## 2019-03-10 NOTE — PLAN OF CARE
Edema 6B: Edema eval complete. Pt familiar to services and has his personal velcro compression wraps with him. Pts mild BLE lymphedema could be managed best with his custom compression versus GCB quick wrap as lymphedema only mild pitting in feet and non pitting elsewhere. Pt educated velcro compression can replace GCB use as they can reduce limb when in past was viewed as mainaenance garment. Pt educated on options for OP services as he reports abdominal distention and could benefit from MLD and building home routine for pt to use for optimal home management-hand out issued with [facility] info and pt encouraged to seek OP placement prior to d/c. Pt educated to stay active as able and perform LE's exs as appropriate concerning HR and medical state to aide LE reductions. Pt and nursing staff to manage compression on their own as no skilled needs identified beyond todays education and evaluation. Pt to be discharged/orders completed.

## 2019-03-10 NOTE — PROGRESS NOTES
Neuro: A&Ox4.   Cardiac: Afib. HR 's with resting. HR increased with activity but returns to low 100's shortly after activity.               Respiratory: Sating >95% on RA. 2L NC while sleeping.   GI/: Large amount UOP via urinal this afternoon after diuretics added to med regimen. BM X1.  Diet/appetite: Tolerating moderate CHO diet.   Activity: Assist of 2-3, up to bedside commode. Lift.  Pain: Denies pain.  Skin/LDA's: No new deficits noted. Right toe dressing changed per care plan. PIV x2, saline locked.     Plan:  Continue with current plan of care. Notify primary team with changes.

## 2019-03-10 NOTE — PLAN OF CARE
"Neuro: A&Ox4.   Cardiac: Lymph consult. Afib. HR  with resting. HR up to 160 with activity but returns to low 100's shortly after activity. /58 (BP Location: Left leg)   Pulse 93   Temp 98.2  F (36.8  C) (Oral)   Resp 18   Ht 1.803 m (5' 11\")   Wt (!) 172 kg (379 lb 3.1 oz)   SpO2 99%   BMI 52.89 kg/m      Respiratory: Sating >95% on 2L NC while sleeping. THONG- desats to mid 80% and recovers quickly.  GI/: Adequate urine output via urinal. BM X1  Diet/appetite: Tolerating reg diet.   Activity:  Assist of 2-3, up to bedside commode. Lift.  Pain: Left foot numbness/pain with movement- declines interventions  Skin: No new deficits noted. See Flowsheet.   LDA's:PIV (2)- SL    Plan: Continue with POC. Notify primary team with changes.   "

## 2019-03-11 ENCOUNTER — APPOINTMENT (OUTPATIENT)
Dept: PHYSICAL THERAPY | Facility: CLINIC | Age: 69
DRG: 856 | End: 2019-03-11
Payer: COMMERCIAL

## 2019-03-11 LAB
ANION GAP SERPL CALCULATED.3IONS-SCNC: 11 MMOL/L (ref 3–14)
ANION GAP SERPL CALCULATED.3IONS-SCNC: 12 MMOL/L (ref 3–14)
BUN SERPL-MCNC: 12 MG/DL (ref 7–30)
BUN SERPL-MCNC: 17 MG/DL (ref 7–30)
CALCIUM SERPL-MCNC: 8.2 MG/DL (ref 8.5–10.1)
CALCIUM SERPL-MCNC: 8.6 MG/DL (ref 8.5–10.1)
CHLORIDE SERPL-SCNC: 103 MMOL/L (ref 94–109)
CHLORIDE SERPL-SCNC: 105 MMOL/L (ref 94–109)
CO2 SERPL-SCNC: 24 MMOL/L (ref 20–32)
CO2 SERPL-SCNC: 27 MMOL/L (ref 20–32)
CREAT SERPL-MCNC: 1.04 MG/DL (ref 0.66–1.25)
CREAT SERPL-MCNC: 1.22 MG/DL (ref 0.66–1.25)
ERYTHROCYTE [DISTWIDTH] IN BLOOD BY AUTOMATED COUNT: 13 % (ref 10–15)
GFR SERPL CREATININE-BSD FRML MDRD: 60 ML/MIN/{1.73_M2}
GFR SERPL CREATININE-BSD FRML MDRD: 73 ML/MIN/{1.73_M2}
GLUCOSE BLDC GLUCOMTR-MCNC: 141 MG/DL (ref 70–99)
GLUCOSE BLDC GLUCOMTR-MCNC: 87 MG/DL (ref 70–99)
GLUCOSE BLDC GLUCOMTR-MCNC: 91 MG/DL (ref 70–99)
GLUCOSE BLDC GLUCOMTR-MCNC: 98 MG/DL (ref 70–99)
GLUCOSE SERPL-MCNC: 112 MG/DL (ref 70–99)
GLUCOSE SERPL-MCNC: 173 MG/DL (ref 70–99)
HCT VFR BLD AUTO: 42 % (ref 40–53)
HGB BLD-MCNC: 13.4 G/DL (ref 13.3–17.7)
INR PPP: 1.41 (ref 0.86–1.14)
LACTATE BLD-SCNC: 0.9 MMOL/L (ref 0.7–2)
MAGNESIUM SERPL-MCNC: 1.7 MG/DL (ref 1.6–2.3)
MCH RBC QN AUTO: 30.5 PG (ref 26.5–33)
MCHC RBC AUTO-ENTMCNC: 31.9 G/DL (ref 31.5–36.5)
MCV RBC AUTO: 96 FL (ref 78–100)
PLATELET # BLD AUTO: 197 10E9/L (ref 150–450)
POTASSIUM SERPL-SCNC: 3.3 MMOL/L (ref 3.4–5.3)
POTASSIUM SERPL-SCNC: 3.7 MMOL/L (ref 3.4–5.3)
RBC # BLD AUTO: 4.39 10E12/L (ref 4.4–5.9)
SODIUM SERPL-SCNC: 139 MMOL/L (ref 133–144)
SODIUM SERPL-SCNC: 143 MMOL/L (ref 133–144)
WBC # BLD AUTO: 7.2 10E9/L (ref 4–11)

## 2019-03-11 PROCEDURE — 12000001 ZZH R&B MED SURG/OB UMMC

## 2019-03-11 PROCEDURE — 25000132 ZZH RX MED GY IP 250 OP 250 PS 637: Performed by: STUDENT IN AN ORGANIZED HEALTH CARE EDUCATION/TRAINING PROGRAM

## 2019-03-11 PROCEDURE — 85027 COMPLETE CBC AUTOMATED: CPT | Performed by: STUDENT IN AN ORGANIZED HEALTH CARE EDUCATION/TRAINING PROGRAM

## 2019-03-11 PROCEDURE — 99233 SBSQ HOSP IP/OBS HIGH 50: CPT | Mod: GC | Performed by: INTERNAL MEDICINE

## 2019-03-11 PROCEDURE — 25000132 ZZH RX MED GY IP 250 OP 250 PS 637

## 2019-03-11 PROCEDURE — 80048 BASIC METABOLIC PNL TOTAL CA: CPT

## 2019-03-11 PROCEDURE — 36415 COLL VENOUS BLD VENIPUNCTURE: CPT

## 2019-03-11 PROCEDURE — 83735 ASSAY OF MAGNESIUM: CPT | Performed by: STUDENT IN AN ORGANIZED HEALTH CARE EDUCATION/TRAINING PROGRAM

## 2019-03-11 PROCEDURE — 83605 ASSAY OF LACTIC ACID: CPT

## 2019-03-11 PROCEDURE — 97530 THERAPEUTIC ACTIVITIES: CPT | Mod: GP

## 2019-03-11 PROCEDURE — 25000132 ZZH RX MED GY IP 250 OP 250 PS 637: Performed by: INTERNAL MEDICINE

## 2019-03-11 PROCEDURE — 85610 PROTHROMBIN TIME: CPT | Performed by: STUDENT IN AN ORGANIZED HEALTH CARE EDUCATION/TRAINING PROGRAM

## 2019-03-11 PROCEDURE — 97110 THERAPEUTIC EXERCISES: CPT | Mod: GP

## 2019-03-11 PROCEDURE — 36415 COLL VENOUS BLD VENIPUNCTURE: CPT | Performed by: STUDENT IN AN ORGANIZED HEALTH CARE EDUCATION/TRAINING PROGRAM

## 2019-03-11 PROCEDURE — 80048 BASIC METABOLIC PNL TOTAL CA: CPT | Performed by: STUDENT IN AN ORGANIZED HEALTH CARE EDUCATION/TRAINING PROGRAM

## 2019-03-11 PROCEDURE — 00000146 ZZHCL STATISTIC GLUCOSE BY METER IP

## 2019-03-11 RX ORDER — POTASSIUM CHLORIDE 1500 MG/1
60 TABLET, EXTENDED RELEASE ORAL ONCE
Status: COMPLETED | OUTPATIENT
Start: 2019-03-11 | End: 2019-03-11

## 2019-03-11 RX ORDER — LISINOPRIL 2.5 MG/1
2.5 TABLET ORAL ONCE
Status: COMPLETED | OUTPATIENT
Start: 2019-03-11 | End: 2019-03-11

## 2019-03-11 RX ORDER — POTASSIUM CHLORIDE 1.5 G/1.58G
60 POWDER, FOR SOLUTION ORAL ONCE
Status: DISCONTINUED | OUTPATIENT
Start: 2019-03-11 | End: 2019-03-11

## 2019-03-11 RX ORDER — SPIRONOLACTONE 25 MG/1
25 TABLET ORAL DAILY
Status: DISCONTINUED | OUTPATIENT
Start: 2019-03-11 | End: 2019-03-13

## 2019-03-11 RX ORDER — LISINOPRIL 2.5 MG/1
5 TABLET ORAL DAILY
Status: DISCONTINUED | OUTPATIENT
Start: 2019-03-12 | End: 2019-03-11

## 2019-03-11 RX ORDER — LISINOPRIL 2.5 MG/1
2.5 TABLET ORAL DAILY
Status: DISCONTINUED | OUTPATIENT
Start: 2019-03-12 | End: 2019-03-14

## 2019-03-11 RX ADMIN — ASPIRIN 81 MG: 81 TABLET, COATED ORAL at 08:30

## 2019-03-11 RX ADMIN — CHLORHEXIDINE GLUCONATE 15 ML: 1.2 RINSE ORAL at 10:54

## 2019-03-11 RX ADMIN — CHLORHEXIDINE GLUCONATE 15 ML: 1.2 RINSE ORAL at 20:28

## 2019-03-11 RX ADMIN — ATORVASTATIN CALCIUM 40 MG: 40 TABLET, FILM COATED ORAL at 22:09

## 2019-03-11 RX ADMIN — LISINOPRIL 2.5 MG: 2.5 TABLET ORAL at 08:30

## 2019-03-11 RX ADMIN — METOPROLOL SUCCINATE 75 MG: 25 TABLET, EXTENDED RELEASE ORAL at 08:29

## 2019-03-11 RX ADMIN — AMOXICILLIN AND CLAVULANATE POTASSIUM 1 TABLET: 875; 125 TABLET, FILM COATED ORAL at 20:28

## 2019-03-11 RX ADMIN — POTASSIUM CHLORIDE 60 MEQ: 1500 TABLET, EXTENDED RELEASE ORAL at 23:43

## 2019-03-11 RX ADMIN — AMOXICILLIN AND CLAVULANATE POTASSIUM 1 TABLET: 875; 125 TABLET, FILM COATED ORAL at 08:30

## 2019-03-11 RX ADMIN — TORSEMIDE 100 MG: 100 TABLET ORAL at 08:30

## 2019-03-11 RX ADMIN — TAMSULOSIN HYDROCHLORIDE 0.4 MG: 0.4 CAPSULE ORAL at 08:30

## 2019-03-11 RX ADMIN — LISINOPRIL 2.5 MG: 2.5 TABLET ORAL at 12:54

## 2019-03-11 RX ADMIN — WARFARIN SODIUM 12.5 MG: 2.5 TABLET ORAL at 18:24

## 2019-03-11 RX ADMIN — SPIRONOLACTONE 25 MG: 25 TABLET ORAL at 12:54

## 2019-03-11 RX ADMIN — LEVOTHYROXINE SODIUM 175 MCG: 25 TABLET ORAL at 08:30

## 2019-03-11 ASSESSMENT — MIFFLIN-ST. JEOR: SCORE: 2496.13

## 2019-03-11 ASSESSMENT — ACTIVITIES OF DAILY LIVING (ADL)
ADLS_ACUITY_SCORE: 20

## 2019-03-11 NOTE — PLAN OF CARE
6B  Discharge Planner PT   Patient plan for discharge: did not discuss  Current status: Pt requires min-mod A x1 with supine<>sit from flat bed via rolling to sidelying; sat EOB for 8-10 min with HR in 160-180's. Did not attempt standing due to elevated HR and afib. Supine and seated LE exercises completed.  Barriers to return to prior living situation: medical status, assist needs, strength, activity tolerance  Recommendations for discharge: TCU  Rationale for recommendations: Pt lives alone and currently requires significant assist for any OOB activity; would benefit from continued PT for increased IND with functional mobility.       Entered by: Jelena Good 03/11/2019 9:43 AM

## 2019-03-11 NOTE — PLAN OF CARE
Neuro: A&Ox4. Numbness in feet.  Cardiac: A-fib; tachy with movement. VSS.   Respiratory: Sating % on RA.  GI/: Adequate urine output. BM X1  Diet/appetite: Tolerating  diet. Eating well.  Activity:  Assist of 2 with walker up to commode.  Pain: Denies pain.   Skin: No new deficits noted.  Dressing on right big toe changed.  LDA's: 2 PIV; saline locked.    Medical Telemetry transfer orders.      Plan: Continue with POC. Notify primary team with changes.

## 2019-03-11 NOTE — PLAN OF CARE
"Neuro: A&Ox4.   Cardiac: Afib. HR . /60 (BP Location: Left leg)   Pulse 89   Temp 98  F (36.7  C)   Resp 20   Ht 1.803 m (5' 11\")   Wt (!) 172 kg (379 lb 3.1 oz)   SpO2 100%   BMI 52.89 kg/m      Respiratory: Sating % on 1L NC overnight for THONG/ pt request.  GI/: Large urine output via urinal- see I/O flowsheet.   Diet/appetite: Tolerating reg diet.   Activity:  Assist of 2 with walker.   Pain: Left toe/ankle tenderness with movement.  Skin: No new deficits noted.  LDA's: PIV- SL    Lymph met with pt on 3/10 and recommended follow up outpatient- will need referral/ apt set up before discharge.     Plan: Transfer to medical telemetry. Continue with POC. Notify primary team with changes.   "

## 2019-03-11 NOTE — PROVIDER NOTIFICATION
Spoke with Dr. Kennedy regarding pt's HR. Pt HR up to 200s when getting off commode. No new orders at this time, continue to monitor.

## 2019-03-11 NOTE — PROGRESS NOTES
[facility], [place]  Progress Note - [facility] 1 Service   Date of Admission:  3/6/2019    Assessment & Plan   Clarke Moreira is a 68 year old male with PMH of afib on warfarin, HFpEF, T2DM, and recent dental work (2/26/19) who is admitted on 3/6/2019 with severe sepsis and MICHELLE. Likely 2/2 sinusitis and possible contribution of excessive diuresis/poor PO intake.      Today:  Resumed spironolactone  Stopped K+ replacement, will monitor needs and supplement per MD  Increased lisinopril dose to 5mg    Will consider decrease to 2.5mg pending BP trend  INR sub therapeutic   DCed tele  Stopped neuro checks  Will need OP referral for lymphedema therapy   Nearing ready for discharge    Sever sepsis: Resolved  Recent dental procedures with tooth extraction 2/26/19:  Left Maxillary sinusitis: Sepsis resolved.  Suspect oral/sinusitus as source given timing and CT findings.  Will narrow ABX to augmentin per OMFS recs today.    - ECHO without vegitations  - blood cultures, NGTD  - MRSA nares negative  - OMFS consulted, appreciate recs   Stopped vanc/zosyn 3/6-3/9   PO Augmentin 3/9-12    Sinus precautions for 6 weeks    Follow up with dentist as needed     Chlorhexidine BID 10 days    Saline nasal spray PNR     MICHELLE: Baseline creatinine 1.0.  Increased to 4.15 on admission, downtrending after fluids. Retroperitoneal US without hydro or obvious parenchymal changes. Now back to baseline.  Up titrating home meds  - PRN bladder scan/cath  - PTA tamsulosin    Diabetic foot ulcers:  Hx PAD:  Last VICK 9/24/17 with possible occlusion of right anterior tibial artery and dorsalis pedis.  Black eschar on b/l great toes on admission.  Last A1c 6.1, CRP 5.9, ESR 19, less likely osteomyelitis given nl labs.  At this time suspect that sepsis more likely 2/2 dental procedure as above.  S/p bedside debridement by podiatry.    - Daily dressing changes, keep clean/dry  - WBAT w/ protective footwear for transfers  and PT  - Podiatry consulted, appreciate recs    Follow up in podiatry clinic 1-2 weeks after discharge     WBAT    Offloading boots     HFpEF:   HTN:   Supratherapeutic INR:  Afib s/p ablation 2008 on warfarin: Prior systolic heart failure, most recent echo 10/2018 with EF 55-60%. CXR with pulmonary venous congestion, improved when compared to 1/5/2018, trace left pleural effusion and mild bibasilar atelectasis. BNP elevated on admit 2,329. Troponin 0.00.  Afib with RVR to 200s 3/8.  [id number] 4. INR 3.92 on admission.  - warfarin dosing per pharmacy  - Increased PTA metoprolol succinate to 75mg   - Resumed PTA torsemide (reduced dose of 100mg) 150 mg BID   - Holding spironolactone 25 mg daily  - Resumed PTA lisinopril  - Holding PTA potassium.  Replete as needed per protocol    - PT/OT consults, strength and ADLs, and lymphedema  - Telemetry  - Follows in core clinic     Prior CVA:  CT head, MRI/MRA brain with no acute infarct. Noted some leukoaraiosis, b/l occipital encephalomalacia likely prior infarct. Chronic infarct noted in cerebellum.  - Neurology consulted in ED, signed off 3/6/19    Left temple lesion:   Hx BCC: Site of prior skin biopsy/exisino.  Per patient has become chronic, never fully heals and bleeds easily.  Path from 2014 consistent with BCC.  Concerning for incomplete resection/marjolin's ulcer?    - Outpatient follow up with PCP or dermatology    Chronic Issues:  THONG:  CPAP used at home, patient declines CPAP here in hospital, does not like our masks  DMII:  Last A1c 6.1 on 2/26/19.  Will plan to resume PTA metformin at discharge  Hypothyroidism: PTA levothyroxine  Depression/anxiety: NTD     Diet: Consistent Carbohydrate Diet 4588-4695 Calories: Moderate Consistent CHO (4-6 CHO units/meal)    Fluids: IVF boluses PRN  Lines: PIV  DVT Prophylaxis: PTA Warfarin, pharmacy to dose  Bazzi Catheter: not present  Code Status: DNR/DNI  patient would like to discuss pressors if he needs this, if he is  "unable to discuss pressors because he is so altered/hypotensive, then he would NOT want pressors and the medical team should \"just let me go\"    Disposition Plan   Expected discharge: 1-2 days, recommended to prior living arrangement vs TCU once tolerating home meds and BPs/HRs stable, pending further PT/OT recs  Entered: Jesús Olivas MD 03/11/2019, 11:53 AM     Jesús Olivas MD  [facility] 1 Service  [facility], [place]  Pager: [phone]  Please see sticky note for cross cover information  ______________________________________________________________________    Interval History   Patient continues to feel improved. Left para nasal discomfort continues.  Denies fevers, chills, lightheadedness.  No chest pain or presyncope.    Reviewed plan, patient updated and questions answered at bedside.        Data reviewed today: I reviewed all medications, new labs and imaging results over the last 24 hours.    Physical Exam   Vital Signs: Temp: 98.5  F (36.9  C) Temp src: Oral BP: 129/71 Pulse: 89 Heart Rate: 92 Resp: 18 SpO2: 99 % O2 Device: Nasal cannula Oxygen Delivery: 1 LPM  Weight: 375 lbs 10.62 oz    GEN: Adult male, resting supine in bed. NAD  HEENT: PERRL, no conjunctival injection, anicteric.  No discharge visible at the nares, left paranasal tenderness to palpation   CV: Tachy, irregularly irregular, NMRG. Warm, well-perfused extremities, unable to appreciate JVD  RESP: CTAB from the anterior, normal WOB  GI: soft, non-tender, no masses or organomegaly appreciated.  Normal bowel sounds.  MSK: Right great toes with new dressing in place, C/D/I.  Left with decreased callous.  Bilateral legs with trace non pitting edema  Skin: Warm, dry. Left temple lesion with depressed center and raised borders with area of scabbing unchanged.    Neuro: Alert, CNs II-XII grossly intact. Sensation and motor function of extremities grossly intact.   Psych: Appropriate mood and affect.    CMP  Recent " Labs   Lab 03/11/19  0452 03/10/19  1909 03/10/19  0520 03/09/19  0423 03/08/19  0452  03/06/19  1110     --  143 145* 144   < > 136   POTASSIUM 3.7 3.2* 3.8 4.1 4.5   < > 4.7   CHLORIDE 105  --  112* 112* 109   < > 99   CO2 27  --  24 27 27   < > 24   ANIONGAP 11  --  6 6 9   < > 13   *  --  117* 106* 116*   < > 104*   BUN 12  --  13 21 40*   < > 64*   CR 1.04  --  1.08 1.51* 2.33*   < > 4.15*   GFRESTIMATED 73  --  70 47* 28*   < > 14*   GFRESTBLACK 85  --  81 54* 32*   < > 16*   CHERI 8.6  --  8.9 8.4* 9.0   < > 9.6   MAG 1.7  --  1.9 1.7 1.9   < >  --    PROTTOTAL  --   --   --   --   --   --  7.5   ALBUMIN  --   --   --   --   --   --  3.5   BILITOTAL  --   --   --   --   --   --  0.5   ALKPHOS  --   --   --   --   --   --  105   AST  --   --   --   --   --   --  14   ALT  --   --   --   --   --   --  18    < > = values in this interval not displayed.     CBC  Recent Labs   Lab 03/11/19  0452 03/10/19  0520 03/09/19  0423 03/08/19  0452   WBC 7.2 7.0 6.5 6.4   RBC 4.39* 4.23* 4.30* 4.58   HGB 13.4 13.0* 13.3 14.3   HCT 42.0 41.2 41.7 44.2   MCV 96 97 97 97   MCH 30.5 30.7 30.9 31.2   MCHC 31.9 31.6 31.9 32.4   RDW 13.0 13.2 13.2 13.2    206 195 199     INR  Recent Labs   Lab 03/11/19  0452 03/10/19  0520 03/09/19  0423 03/08/19  0452   INR 1.41* 1.41* 2.01* 3.03*

## 2019-03-11 NOTE — PROGRESS NOTES
"Social Work: Assessment with Discharge Plan    Patient Name:  Clarke Moreira  :  1950  Age:  68 year old  MRN:  [id number]  Risk/Complexity Score:  Filed Complexity Screen Score: 9  Completed assessment with:  Patient, Chart Review    Presenting Information   Reason for Referral:  Discharge plan  Date of Intake:  2019  Referral Source:  Chart Review  Decision Maker:  Patient  Alternate Decision Maker:  Pt reports he has a HCD assigning his friend Dre Lucas as his HCA.   Health Care Directive:  Will bring in copy  Living Situation:  Apartment alone with no TRINITY. Pt reports that he was receiving weekly RN visits and then had \"graduated\" to only needing every other week RN visits. He states that he anticipates that his RN visits will be increased again to weekly at discharge.   Pt has own rolling walker, grab bars by the toilet, and hospital bed.   Previous Functional Status:  Assistance with Transportation:  [employer] and Medication Management:  [facility] RN  Patient and family understanding of hospitalization:  Pt demonstrates appropriate understanding.  Cultural/Language/Spiritual Considerations:  English speaking  Adjustment to Illness:  Appropriate. Pt reports that he has had a good experience during this hospitalization and is happy with the care. Pt is open to going to rehab if recommended prior to return home.     Physical Health  Reason for Admission:    1. Atrial fibrillation, unspecified type (H)    2. Sepsis (H)    3. Other osteomyelitis, multiple sites (H)    4. Acute maxillary sinusitis, recurrence not specified    5. MICHELLE (acute kidney injury) (H)    6. Septicemic (H)      Services Needed/Recommended:  TCU    Mental Health/Chemical Dependency  Diagnosis:  No dx per medical record.  Support/Services in Place:  n/a  Services Needed/Recommended:  n/a    Support System  Significant relationship at present time:  Single.  Family of origin is available for support:  None  Other " support available:  Friends  Gaps in support system:  Yes  Patient is caregiver to:  None     Provider Information   Primary Care Physician:  Matthias Sanchez   [phone]   Clinic:  [address] [address]      :  None    Financial   Income Source:  Did not discuss  Financial Concerns:  None identified at this time  Insurance:    Payor/Plan Subscriber Name Rel Member # Group #   UC[employer] - [employer] MEDICAR* AJTI CALLAHAN*  [id number] [place] [address]       Discharge Plan   Patient and family discharge goal:  Pt is understanding and agreeable to TCU at discharge. He had a previous stay at  TCU and would prefer to return there if possible.  Provided education on discharge plan:  YES  Patient agreeable to discharge plan:  YES  A list of Medicare Certified Facilities was provided to the patient and/or family to encourage patient choice. Patient's choices for facility are:  1) [facility] - referral made to  TCU Liaison (Pati) for review today.  Pt agreed to review list for alternatives.   Will NH provide Skilled rehabilitation or complex medical:  YES  General information regarding anticipated insurance coverage and possible out of pocket cost was discussed. Patient and patient's family are aware patient may incur the cost of transportation to the facility, pending insurance payment: YES  Barriers to discharge:  Medical stability    Discharge Recommendations   Anticipated Disposition:  Facility:  TCU  Transportation Needs:  Pt reports that he exclusively uses [employer] (Ph: [phone]) for transport in the community r/t his weight/size. He reports he is unable to get in/out of a cab r/t this. [employer] requires at least one day notice for any transport needs and he reports that this has been a problem when discharging from the hospital in the past d/t not receiving a 24hr notice.  Pt is agreeable to using [employer] (Ph: [phone]) if he  is able to discharge to  TCU r/t the contact b/w [facility] and [facility] so that he is not charged for the cost of this ride.   Name of Transportation Company and Phone:  -[facility] (Ph: [phone])  -[facility] (Ph: [phone])    AARON Self, [facility]  6B Intermediate Care Unit   Phone: [phone]  Pager: [phone]

## 2019-03-12 ENCOUNTER — APPOINTMENT (OUTPATIENT)
Dept: PHYSICAL THERAPY | Facility: CLINIC | Age: 69
DRG: 856 | End: 2019-03-12
Payer: COMMERCIAL

## 2019-03-12 LAB
ANION GAP SERPL CALCULATED.3IONS-SCNC: 11 MMOL/L (ref 3–14)
ANION GAP SERPL CALCULATED.3IONS-SCNC: 7 MMOL/L (ref 3–14)
BACTERIA SPEC CULT: NO GROWTH
BACTERIA SPEC CULT: NO GROWTH
BUN SERPL-MCNC: 15 MG/DL (ref 7–30)
BUN SERPL-MCNC: 19 MG/DL (ref 7–30)
CALCIUM SERPL-MCNC: 8.7 MG/DL (ref 8.5–10.1)
CALCIUM SERPL-MCNC: 8.9 MG/DL (ref 8.5–10.1)
CHLORIDE SERPL-SCNC: 104 MMOL/L (ref 94–109)
CHLORIDE SERPL-SCNC: 104 MMOL/L (ref 94–109)
CO2 SERPL-SCNC: 26 MMOL/L (ref 20–32)
CO2 SERPL-SCNC: 29 MMOL/L (ref 20–32)
CREAT SERPL-MCNC: 1.19 MG/DL (ref 0.66–1.25)
CREAT SERPL-MCNC: 1.21 MG/DL (ref 0.66–1.25)
ERYTHROCYTE [DISTWIDTH] IN BLOOD BY AUTOMATED COUNT: 12.8 % (ref 10–15)
GFR SERPL CREATININE-BSD FRML MDRD: 61 ML/MIN/{1.73_M2}
GFR SERPL CREATININE-BSD FRML MDRD: 62 ML/MIN/{1.73_M2}
GLUCOSE BLDC GLUCOMTR-MCNC: 104 MG/DL (ref 70–99)
GLUCOSE BLDC GLUCOMTR-MCNC: 114 MG/DL (ref 70–99)
GLUCOSE BLDC GLUCOMTR-MCNC: 127 MG/DL (ref 70–99)
GLUCOSE BLDC GLUCOMTR-MCNC: 90 MG/DL (ref 70–99)
GLUCOSE BLDC GLUCOMTR-MCNC: 95 MG/DL (ref 70–99)
GLUCOSE SERPL-MCNC: 109 MG/DL (ref 70–99)
GLUCOSE SERPL-MCNC: 135 MG/DL (ref 70–99)
HCT VFR BLD AUTO: 41.5 % (ref 40–53)
HGB BLD-MCNC: 13.5 G/DL (ref 13.3–17.7)
INR PPP: 1.54 (ref 0.86–1.14)
Lab: NORMAL
Lab: NORMAL
MAGNESIUM SERPL-MCNC: 1.6 MG/DL (ref 1.6–2.3)
MCH RBC QN AUTO: 31 PG (ref 26.5–33)
MCHC RBC AUTO-ENTMCNC: 32.5 G/DL (ref 31.5–36.5)
MCV RBC AUTO: 95 FL (ref 78–100)
PLATELET # BLD AUTO: 230 10E9/L (ref 150–450)
POTASSIUM SERPL-SCNC: 3.6 MMOL/L (ref 3.4–5.3)
POTASSIUM SERPL-SCNC: 3.8 MMOL/L (ref 3.4–5.3)
RBC # BLD AUTO: 4.36 10E12/L (ref 4.4–5.9)
SODIUM SERPL-SCNC: 140 MMOL/L (ref 133–144)
SODIUM SERPL-SCNC: 140 MMOL/L (ref 133–144)
SPECIMEN SOURCE: NORMAL
SPECIMEN SOURCE: NORMAL
TSH SERPL DL<=0.005 MIU/L-ACNC: 1.38 MU/L (ref 0.4–4)
WBC # BLD AUTO: 8.9 10E9/L (ref 4–11)

## 2019-03-12 PROCEDURE — 25000128 H RX IP 250 OP 636

## 2019-03-12 PROCEDURE — 84443 ASSAY THYROID STIM HORMONE: CPT | Performed by: STUDENT IN AN ORGANIZED HEALTH CARE EDUCATION/TRAINING PROGRAM

## 2019-03-12 PROCEDURE — 85610 PROTHROMBIN TIME: CPT | Performed by: STUDENT IN AN ORGANIZED HEALTH CARE EDUCATION/TRAINING PROGRAM

## 2019-03-12 PROCEDURE — 80048 BASIC METABOLIC PNL TOTAL CA: CPT

## 2019-03-12 PROCEDURE — 99233 SBSQ HOSP IP/OBS HIGH 50: CPT | Mod: GC | Performed by: INTERNAL MEDICINE

## 2019-03-12 PROCEDURE — 25000132 ZZH RX MED GY IP 250 OP 250 PS 637: Performed by: STUDENT IN AN ORGANIZED HEALTH CARE EDUCATION/TRAINING PROGRAM

## 2019-03-12 PROCEDURE — 25000132 ZZH RX MED GY IP 250 OP 250 PS 637: Performed by: INTERNAL MEDICINE

## 2019-03-12 PROCEDURE — 93005 ELECTROCARDIOGRAM TRACING: CPT

## 2019-03-12 PROCEDURE — 83735 ASSAY OF MAGNESIUM: CPT | Performed by: STUDENT IN AN ORGANIZED HEALTH CARE EDUCATION/TRAINING PROGRAM

## 2019-03-12 PROCEDURE — 36415 COLL VENOUS BLD VENIPUNCTURE: CPT

## 2019-03-12 PROCEDURE — 25000132 ZZH RX MED GY IP 250 OP 250 PS 637

## 2019-03-12 PROCEDURE — 12000001 ZZH R&B MED SURG/OB UMMC

## 2019-03-12 PROCEDURE — 93010 ELECTROCARDIOGRAM REPORT: CPT | Performed by: INTERNAL MEDICINE

## 2019-03-12 PROCEDURE — 00000146 ZZHCL STATISTIC GLUCOSE BY METER IP

## 2019-03-12 PROCEDURE — 85027 COMPLETE CBC AUTOMATED: CPT | Performed by: STUDENT IN AN ORGANIZED HEALTH CARE EDUCATION/TRAINING PROGRAM

## 2019-03-12 PROCEDURE — 80048 BASIC METABOLIC PNL TOTAL CA: CPT | Performed by: STUDENT IN AN ORGANIZED HEALTH CARE EDUCATION/TRAINING PROGRAM

## 2019-03-12 PROCEDURE — 97530 THERAPEUTIC ACTIVITIES: CPT | Mod: GP

## 2019-03-12 PROCEDURE — 36415 COLL VENOUS BLD VENIPUNCTURE: CPT | Performed by: STUDENT IN AN ORGANIZED HEALTH CARE EDUCATION/TRAINING PROGRAM

## 2019-03-12 RX ORDER — POTASSIUM CHLORIDE 750 MG/1
40 TABLET, EXTENDED RELEASE ORAL DAILY
Status: DISCONTINUED | OUTPATIENT
Start: 2019-03-13 | End: 2019-03-22 | Stop reason: HOSPADM

## 2019-03-12 RX ORDER — MAGNESIUM SULFATE HEPTAHYDRATE 40 MG/ML
2 INJECTION, SOLUTION INTRAVENOUS ONCE
Status: COMPLETED | OUTPATIENT
Start: 2019-03-12 | End: 2019-03-12

## 2019-03-12 RX ORDER — POTASSIUM CHLORIDE 20MEQ/15ML
40 LIQUID (ML) ORAL DAILY
Status: DISCONTINUED | OUTPATIENT
Start: 2019-03-12 | End: 2019-03-12

## 2019-03-12 RX ORDER — POTASSIUM CHLORIDE 750 MG/1
20 TABLET, EXTENDED RELEASE ORAL ONCE
Status: COMPLETED | OUTPATIENT
Start: 2019-03-12 | End: 2019-03-12

## 2019-03-12 RX ORDER — METOPROLOL SUCCINATE 50 MG/1
100 TABLET, EXTENDED RELEASE ORAL DAILY
Status: DISCONTINUED | OUTPATIENT
Start: 2019-03-13 | End: 2019-03-13

## 2019-03-12 RX ORDER — METOPROLOL SUCCINATE 25 MG/1
25 TABLET, EXTENDED RELEASE ORAL ONCE
Status: COMPLETED | OUTPATIENT
Start: 2019-03-12 | End: 2019-03-12

## 2019-03-12 RX ORDER — METOPROLOL TARTRATE 1 MG/ML
5 INJECTION, SOLUTION INTRAVENOUS ONCE
Status: DISCONTINUED | OUTPATIENT
Start: 2019-03-12 | End: 2019-03-12

## 2019-03-12 RX ADMIN — LEVOTHYROXINE SODIUM 175 MCG: 25 TABLET ORAL at 10:03

## 2019-03-12 RX ADMIN — POTASSIUM CHLORIDE 40 MEQ: 40 SOLUTION ORAL at 10:01

## 2019-03-12 RX ADMIN — CHLORHEXIDINE GLUCONATE 15 ML: 1.2 RINSE ORAL at 19:28

## 2019-03-12 RX ADMIN — MAGNESIUM SULFATE HEPTAHYDRATE 2 G: 40 INJECTION, SOLUTION INTRAVENOUS at 12:36

## 2019-03-12 RX ADMIN — ATORVASTATIN CALCIUM 40 MG: 40 TABLET, FILM COATED ORAL at 22:05

## 2019-03-12 RX ADMIN — TORSEMIDE 100 MG: 100 TABLET ORAL at 10:02

## 2019-03-12 RX ADMIN — METOPROLOL SUCCINATE 25 MG: 25 TABLET, EXTENDED RELEASE ORAL at 12:36

## 2019-03-12 RX ADMIN — TAMSULOSIN HYDROCHLORIDE 0.4 MG: 0.4 CAPSULE ORAL at 10:03

## 2019-03-12 RX ADMIN — SPIRONOLACTONE 25 MG: 25 TABLET ORAL at 10:03

## 2019-03-12 RX ADMIN — ASPIRIN 81 MG: 81 TABLET, COATED ORAL at 10:01

## 2019-03-12 RX ADMIN — AMOXICILLIN AND CLAVULANATE POTASSIUM 1 TABLET: 875; 125 TABLET, FILM COATED ORAL at 19:28

## 2019-03-12 RX ADMIN — LISINOPRIL 2.5 MG: 2.5 TABLET ORAL at 10:03

## 2019-03-12 RX ADMIN — METOPROLOL SUCCINATE 75 MG: 25 TABLET, EXTENDED RELEASE ORAL at 10:02

## 2019-03-12 RX ADMIN — AMOXICILLIN AND CLAVULANATE POTASSIUM 1 TABLET: 875; 125 TABLET, FILM COATED ORAL at 10:03

## 2019-03-12 RX ADMIN — CHLORHEXIDINE GLUCONATE 15 ML: 1.2 RINSE ORAL at 10:04

## 2019-03-12 RX ADMIN — WARFARIN SODIUM 17.5 MG: 2.5 TABLET ORAL at 17:10

## 2019-03-12 RX ADMIN — POTASSIUM CHLORIDE 20 MEQ: 750 TABLET, EXTENDED RELEASE ORAL at 22:05

## 2019-03-12 RX ADMIN — TORSEMIDE 100 MG: 100 TABLET ORAL at 17:10

## 2019-03-12 ASSESSMENT — ACTIVITIES OF DAILY LIVING (ADL)
ADLS_ACUITY_SCORE: 20
ADLS_ACUITY_SCORE: 18
ADLS_ACUITY_SCORE: 20
ADLS_ACUITY_SCORE: 18
ADLS_ACUITY_SCORE: 18
ADLS_ACUITY_SCORE: 20

## 2019-03-12 NOTE — PROGRESS NOTES
Social Work Services Progress Note    Hospital Day: 7  Date of Initial Social Work Evaluation:    Collaborated with:  Patient, [facility]    Data:  Clarke is a 68 year old male admitted to [facility] 3/6/19 with severe sepsis and MICHELLE. Now nearing ready to discharge and in need of tcu placement    Intervention:  Patient is accepted to [facility] for admission per rehab liaison note from 3/11/19.    Assessment: patient is only open to going to [facility] for rehab. Now ok with community referrals at this time    Plan:    Anticipated Disposition:  Facility:   TCU    Barriers to d/c plan:  Medical clearance, bed availability    Follow Up:  sw following    Kyra TRAN, MSW  5B  (Medical/Surgical)  Phone: [phone]  Pager: [phone]

## 2019-03-12 NOTE — PLAN OF CARE
"Discharge Planner PT   Patient plan for discharge: rehab  Current status: Pt greeted in supine. Pt has been experiencing elevated HR w/ significant afib with activity, session approved by RN and providers aware. BP: 117/62 mmHg (MAP 81) start of session. Symptom based approach utilized with close monitoring of tele. Pt is Vasyl of 2 for supine>sit. Sit<>stand Vasyl of 2. Static standing x2 for ~2min each. Pt CGA in stance, some knee instability, participates in pre-gait exercises w/ shoes on. Pt's HR in standing fluctuating quickly in range of ~150-200bpm, generally ~190bpm. Pt asymptomatic throughout session besides feeling \"clammy\" at end of second bout. Deferred further activity. Sit>supine Vasyl.   Barriers to return to prior living situation: medical, mobility  Recommendations for discharge: TCU  Rationale for recommendations: Pt is below baseline, recommend rehab stay to progress mobility prior to return to previous living situation.        Entered by: Arielle Reese 03/12/2019 11:38 AM       "

## 2019-03-12 NOTE — PLAN OF CARE
A&O, VSS on RA. Right great toe dressing CDI. BG stable. Voiding adequate amount BSU. Reg diet, good appetite. Calls to make needs known. Continue with POC.

## 2019-03-12 NOTE — PLAN OF CARE
[id number]: Afebrile. O2 sats stable on RA. BPs soft and HR tachycardic, especially with exertion. HR up to 180s-190s when ambulating or using commode. Provider is aware (see previous note.) Telemetry a-fib + tachycardic. A/Ox4. Up with A2 to BSC. Voiding in urinal. RPIV SL. Denies nausea. C/O of mild pain in L ankle but declined pain medication. Blood sugars stable. Potassium and magnesium replaced this morning. Skin on bottom CDI, patient repositions independently in bed. Wound on R great toe cleansed and dressing changed.+2edema in BLEs. Lymphedema consult placed today. Calls appropriately and makes needs known. Continue to monitor and follow POC.    Kaelyn Anderson RN on 3/12/2019 at 7:04 PM

## 2019-03-12 NOTE — PROVIDER NOTIFICATION
Patient HR sustaining 190s-200s with activity. Asymptomatic. Patient currently up with PT. Provider aware of situation. Patient had another tachycardic event around 0930 this morning when getting to the commode (provider witnessed.) Provider stated to RN and patient that he still wanted patient up and moving with PT or to the commode despite high heart rates.  Will continue with normal activity and monitor closely per MD recommendation.     Kaelyn Anderson RN on 3/12/2019 at 10:45 AM

## 2019-03-12 NOTE — PLAN OF CARE
Tachycardic- Pt's HR got up to 180-200s while up to BSC this AM. Pt was dizzy, increased lower extremity weakness, hand tremors, pt appeared very distressed/anxious. Pt had a previous episode similar to this on 6B yesterday- MDs aware. A-fib on tele.  Soft BPs 90s-100s/ 50-60s. On 1L NC. A&Ox4. Calls appropriately. Carb controlled diet. BG checks AC & HS. 0200 check was 104. PIV x2 SL. BLE +2 edema, josef, N/T present.  R toe dressing CDI. L toe wound LANG. Pt was a heavy 2 assist with walker, pivot transfer to BSC- Writer would recommend waiting for PT to evaluate before getting pt up out of bed again. Potassium 3.3- replaced overnight. Re-check 3.8 this AM. Continue to monitor and follow POC.

## 2019-03-12 NOTE — PROGRESS NOTES
[facility], [place]  Progress Note - Maroon 1 Service   Date of Admission:  3/6/2019    Assessment & Plan   Clarke Moreira is a 68 year old male with PMH of afib on warfarin, HFpEF, T2DM, and recent dental work (2/26/19) who is admitted on 3/6/2019 with severe sepsis and MICHELLE. Likely 2/2 sinusitis and possible contribution of excessive diuresis/poor PO intake.  Infection improved.      Today:  Started potassium replacement scheduled  Increased metoprolol dose due to recurrent Afib w/ RVR  Working on PT  Decreased lisinopril to 2.5   Continue torsemide and spironolactone at reduced doses  Restarted telemetry  Finished ABX today    Sever sepsis: Resolved  Recent dental procedures with tooth extraction 2/26/19:  Left Maxillary sinusitis: Sepsis resolved.  Suspect oral/sinusitus as source given timing and CT findings.  Completed course of augmentin.  - ECHO without vegitations  - blood cultures, NGTD  - MRSA nares negative  - OMFS consulted, appreciate recs   Stopped vanc/zosyn 3/6-3/9   PO Augmentin 3/9-12    Sinus precautions for 6 weeks    Follow up with dentist as needed     Chlorhexidine BID 10 days    Saline nasal spray PNR     MICHELLE: Baseline creatinine 1.0.  Increased to 4.15 on admission, downtrending after fluids. Retroperitoneal US without hydro or obvious parenchymal changes. Now back to near baseline.  Up titrating home meds  - PRN bladder scan/cath  - PTA tamsulosin resumed (if concerned for orthostasis may need to consider decrease)    Diabetic foot ulcers:  Hx PAD:  Last VICK 9/24/17 with possible occlusion of right anterior tibial artery and dorsalis pedis.  Black eschar on b/l great toes on admission.  Last A1c 6.1, CRP 5.9, ESR 19, less likely osteomyelitis given nl labs.  At this time suspect that sepsis more likely 2/2 dental procedure as above.  S/p bedside debridement by podiatry.    - Daily dressing changes, keep clean/dry  - WBAT w/ protective footwear for transfers  and PT  - Podiatry consulted, appreciate recs    Follow up in podiatry clinic 1-2 weeks after discharge     WBAT    Offloading boots     HFpEF:   HTN:   Supratherapeutic INR:  Afib s/p ablation 2008 on warfarin: Prior systolic heart failure, most recent echo 10/2018 with EF 55-60%. CXR with pulmonary venous congestion, improved when compared to 1/5/2018, trace left pleural effusion and mild bibasilar atelectasis. BNP elevated on admit 2,329. Troponin 0.00.  Afib with RVR to 200s 3/8.  [id number] 4. INR 3.92 on admission.  - warfarin dosing per pharmacy  - Increased PTA metoprolol succinate to 100mg 3/12  - Resumed PTA torsemide (reduced dose of 100mg) 150 mg BID   - Resumed spironolactone 25 mg daily  - Resumed PTA lisinopril (dose reduced to 2.5)  - Resumed potassium 40 daily.  May need to increase to 60, patient prefers CR  - PT/OT consults, strength and ADLs, and lymphedema  - Telemetry  - Follows in core clinic     Prior CVA:  CT head, MRI/MRA brain with no acute infarct. Noted some leukoaraiosis, b/l occipital encephalomalacia likely prior infarct. Chronic infarct noted in cerebellum.  - Neurology consulted in ED, signed off 3/6/19    Left temple lesion:   Hx BCC: Site of prior skin biopsy/exisino.  Per patient has become chronic, never fully heals and bleeds easily.  Path from 2014 consistent with BCC.  Concerning for incomplete resection/marjolin's ulcer?    - Outpatient follow up with PCP or dermatology    Chronic Issues:  THONG:  CPAP used at home, patient declines CPAP here in hospital, does not like our masks  DMII:  Last A1c 6.1 on 2/26/19.  Will plan to resume PTA metformin at discharge  Hypothyroidism: PTA levothyroxine  Depression/anxiety: NTD     Diet: Consistent Carbohydrate Diet 5483-3701 Calories: Moderate Consistent CHO (4-6 CHO units/meal)    Fluids: IVF boluses PRN  Lines: PIV  DVT Prophylaxis: PTA Warfarin, pharmacy to dose  Bazzi Catheter: not present  Code Status: DNR/DNI  patient would like  "to discuss pressors if he needs this, if he is unable to discuss pressors because he is so altered/hypotensive, then he would NOT want pressors and the medical team should \"just let me go\"    Disposition Plan   Expected discharge: 1-2 days, recommended to prior living arrangement vs TCU once tolerating home meds and BPs/HRs stable, pending further PT/OT recs  Entered: Jesús Olivas MD 03/12/2019, 12:24 PM     MD [facility]  [facility] [place]  Pager: [phone]  Please see sticky note for cross cover information  ______________________________________________________________________    Interval History   Describing several episodes of sub optimal communication around care  Voices preference for clear communication and taking time to explain cares  Was light headed and short of breath with periods of tachycardia  Denies fevers, chills, lightheadedness.  No chest pain  Reviewed plan, patient updated and questions answered at bedside.        Data reviewed today: I reviewed all medications, new labs and imaging results over the last 24 hours.    Physical Exam   Vital Signs: Temp: 97.8  F (36.6  C) Temp src: Oral BP: 117/62(MAP 81) Pulse: 100 Heart Rate: 107(at rest in bed) Resp: 20 SpO2: 100 % O2 Device: Nasal cannula Oxygen Delivery: 1 LPM  Weight: 375 lbs 10.62 oz    GEN: Adult male, resting supine in bed. NAD  HEENT: PERRL, no conjunctival injection, anicteric.  No discharge visible at the nares, left paranasal tenderness to palpation improved from prior  CV: Tachy, irregularly irregular, NMRG. Warm, well-perfused extremities, unable to appreciate JVD  RESP: CTAB from the anterior, normal WOB  GI: soft, non-tender, no masses or organomegaly appreciated.  Normal bowel sounds.  MSK: Right great toe with dressing in place, C/D/I.  Bilateral legs with trace non pitting edema and chronic appearing coloration changes  Skin: Warm, dry. Left temple lesion with depressed " center and raised borders with area of scabbing unchanged.    Neuro: Alert, CNs II-XII grossly intact. Sensation and motor function of extremities grossly intact.   Psych: Appropriate mood and affect.    CMP  Recent Labs   Lab 03/12/19  0625 03/11/19  2102 03/11/19  0452 03/10/19  1909 03/10/19  0520 03/09/19  0423  03/06/19  1110    139 143  --  143 145*   < > 136   POTASSIUM 3.8 3.3* 3.7 3.2* 3.8 4.1   < > 4.7   CHLORIDE 104 103 105  --  112* 112*   < > 99   CO2 26 24 27  --  24 27   < > 24   ANIONGAP 11 12 11  --  6 6   < > 13   * 173* 112*  --  117* 106*   < > 104*   BUN 15 17 12  --  13 21   < > 64*   CR 1.21 1.22 1.04  --  1.08 1.51*   < > 4.15*   GFRESTIMATED 61 60* 73  --  70 47*   < > 14*   GFRESTBLACK 70 70 85  --  81 54*   < > 16*   CHERI 8.7 8.2* 8.6  --  8.9 8.4*   < > 9.6   MAG 1.6  --  1.7  --  1.9 1.7   < >  --    PROTTOTAL  --   --   --   --   --   --   --  7.5   ALBUMIN  --   --   --   --   --   --   --  3.5   BILITOTAL  --   --   --   --   --   --   --  0.5   ALKPHOS  --   --   --   --   --   --   --  105   AST  --   --   --   --   --   --   --  14   ALT  --   --   --   --   --   --   --  18    < > = values in this interval not displayed.     CBC  Recent Labs   Lab 03/12/19  0625 03/11/19  0452 03/10/19  0520 03/09/19  0423   WBC 8.9 7.2 7.0 6.5   RBC 4.36* 4.39* 4.23* 4.30*   HGB 13.5 13.4 13.0* 13.3   HCT 41.5 42.0 41.2 41.7   MCV 95 96 97 97   MCH 31.0 30.5 30.7 30.9   MCHC 32.5 31.9 31.6 31.9   RDW 12.8 13.0 13.2 13.2    197 206 195     INR  Recent Labs   Lab 03/12/19  0625 03/11/19  0452 03/10/19  0520 03/09/19  0423   INR 1.54* 1.41* 1.41* 2.01*

## 2019-03-13 ENCOUNTER — APPOINTMENT (OUTPATIENT)
Dept: OCCUPATIONAL THERAPY | Facility: CLINIC | Age: 69
DRG: 856 | End: 2019-03-13
Payer: COMMERCIAL

## 2019-03-13 LAB
ANION GAP SERPL CALCULATED.3IONS-SCNC: 8 MMOL/L (ref 3–14)
BUN SERPL-MCNC: 19 MG/DL (ref 7–30)
CALCIUM SERPL-MCNC: 9 MG/DL (ref 8.5–10.1)
CHLORIDE SERPL-SCNC: 103 MMOL/L (ref 94–109)
CO2 SERPL-SCNC: 29 MMOL/L (ref 20–32)
CREAT SERPL-MCNC: 1.1 MG/DL (ref 0.66–1.25)
ERYTHROCYTE [DISTWIDTH] IN BLOOD BY AUTOMATED COUNT: 13.1 % (ref 10–15)
GFR SERPL CREATININE-BSD FRML MDRD: 68 ML/MIN/{1.73_M2}
GLUCOSE BLDC GLUCOMTR-MCNC: 102 MG/DL (ref 70–99)
GLUCOSE BLDC GLUCOMTR-MCNC: 105 MG/DL (ref 70–99)
GLUCOSE BLDC GLUCOMTR-MCNC: 105 MG/DL (ref 70–99)
GLUCOSE BLDC GLUCOMTR-MCNC: 143 MG/DL (ref 70–99)
GLUCOSE BLDC GLUCOMTR-MCNC: 99 MG/DL (ref 70–99)
GLUCOSE SERPL-MCNC: 113 MG/DL (ref 70–99)
HCT VFR BLD AUTO: 43.1 % (ref 40–53)
HGB BLD-MCNC: 13.7 G/DL (ref 13.3–17.7)
INR PPP: 1.68 (ref 0.86–1.14)
INTERPRETATION ECG - MUSE: NORMAL
LACTATE BLD-SCNC: 1.5 MMOL/L (ref 0.7–2)
LACTATE BLD-SCNC: 1.5 MMOL/L (ref 0.7–2)
MAGNESIUM SERPL-MCNC: 2 MG/DL (ref 1.6–2.3)
MCH RBC QN AUTO: 30.5 PG (ref 26.5–33)
MCHC RBC AUTO-ENTMCNC: 31.8 G/DL (ref 31.5–36.5)
MCV RBC AUTO: 96 FL (ref 78–100)
PLATELET # BLD AUTO: 247 10E9/L (ref 150–450)
POTASSIUM SERPL-SCNC: 4 MMOL/L (ref 3.4–5.3)
POTASSIUM SERPL-SCNC: 4 MMOL/L (ref 3.4–5.3)
RBC # BLD AUTO: 4.49 10E12/L (ref 4.4–5.9)
SODIUM SERPL-SCNC: 140 MMOL/L (ref 133–144)
WBC # BLD AUTO: 8.9 10E9/L (ref 4–11)

## 2019-03-13 PROCEDURE — 83605 ASSAY OF LACTIC ACID: CPT

## 2019-03-13 PROCEDURE — 93005 ELECTROCARDIOGRAM TRACING: CPT

## 2019-03-13 PROCEDURE — 25000132 ZZH RX MED GY IP 250 OP 250 PS 637

## 2019-03-13 PROCEDURE — 36415 COLL VENOUS BLD VENIPUNCTURE: CPT

## 2019-03-13 PROCEDURE — 97140 MANUAL THERAPY 1/> REGIONS: CPT | Mod: GO | Performed by: OCCUPATIONAL THERAPIST

## 2019-03-13 PROCEDURE — 84132 ASSAY OF SERUM POTASSIUM: CPT

## 2019-03-13 PROCEDURE — 93010 ELECTROCARDIOGRAM REPORT: CPT | Performed by: INTERNAL MEDICINE

## 2019-03-13 PROCEDURE — 80048 BASIC METABOLIC PNL TOTAL CA: CPT | Performed by: STUDENT IN AN ORGANIZED HEALTH CARE EDUCATION/TRAINING PROGRAM

## 2019-03-13 PROCEDURE — 25800030 ZZH RX IP 258 OP 636

## 2019-03-13 PROCEDURE — 83605 ASSAY OF LACTIC ACID: CPT | Performed by: INTERNAL MEDICINE

## 2019-03-13 PROCEDURE — 25000132 ZZH RX MED GY IP 250 OP 250 PS 637: Performed by: INTERNAL MEDICINE

## 2019-03-13 PROCEDURE — 97535 SELF CARE MNGMENT TRAINING: CPT | Mod: GO

## 2019-03-13 PROCEDURE — 85027 COMPLETE CBC AUTOMATED: CPT | Performed by: STUDENT IN AN ORGANIZED HEALTH CARE EDUCATION/TRAINING PROGRAM

## 2019-03-13 PROCEDURE — 85610 PROTHROMBIN TIME: CPT | Performed by: STUDENT IN AN ORGANIZED HEALTH CARE EDUCATION/TRAINING PROGRAM

## 2019-03-13 PROCEDURE — 36415 COLL VENOUS BLD VENIPUNCTURE: CPT | Performed by: STUDENT IN AN ORGANIZED HEALTH CARE EDUCATION/TRAINING PROGRAM

## 2019-03-13 PROCEDURE — 83735 ASSAY OF MAGNESIUM: CPT | Performed by: STUDENT IN AN ORGANIZED HEALTH CARE EDUCATION/TRAINING PROGRAM

## 2019-03-13 PROCEDURE — 12000001 ZZH R&B MED SURG/OB UMMC

## 2019-03-13 PROCEDURE — 99233 SBSQ HOSP IP/OBS HIGH 50: CPT | Mod: GC | Performed by: INTERNAL MEDICINE

## 2019-03-13 PROCEDURE — 25000132 ZZH RX MED GY IP 250 OP 250 PS 637: Performed by: STUDENT IN AN ORGANIZED HEALTH CARE EDUCATION/TRAINING PROGRAM

## 2019-03-13 PROCEDURE — 00000146 ZZHCL STATISTIC GLUCOSE BY METER IP

## 2019-03-13 RX ORDER — METOPROLOL SUCCINATE 50 MG/1
100 TABLET, EXTENDED RELEASE ORAL DAILY
Status: DISCONTINUED | OUTPATIENT
Start: 2019-03-14 | End: 2019-03-14

## 2019-03-13 RX ORDER — METOPROLOL SUCCINATE 25 MG/1
25 TABLET, EXTENDED RELEASE ORAL DAILY
Status: DISCONTINUED | OUTPATIENT
Start: 2019-03-13 | End: 2019-03-13

## 2019-03-13 RX ORDER — TORSEMIDE 100 MG/1
100 TABLET ORAL
Status: DISCONTINUED | OUTPATIENT
Start: 2019-03-14 | End: 2019-03-14

## 2019-03-13 RX ORDER — METOPROLOL SUCCINATE 25 MG/1
25 TABLET, EXTENDED RELEASE ORAL ONCE
Status: COMPLETED | OUTPATIENT
Start: 2019-03-13 | End: 2019-03-13

## 2019-03-13 RX ADMIN — ATORVASTATIN CALCIUM 40 MG: 40 TABLET, FILM COATED ORAL at 22:07

## 2019-03-13 RX ADMIN — METOPROLOL SUCCINATE 100 MG: 50 TABLET, EXTENDED RELEASE ORAL at 08:06

## 2019-03-13 RX ADMIN — CHLORHEXIDINE GLUCONATE 15 ML: 1.2 RINSE ORAL at 20:22

## 2019-03-13 RX ADMIN — POTASSIUM CHLORIDE 40 MEQ: 750 TABLET, EXTENDED RELEASE ORAL at 08:06

## 2019-03-13 RX ADMIN — SPIRONOLACTONE 25 MG: 25 TABLET ORAL at 08:10

## 2019-03-13 RX ADMIN — LEVOTHYROXINE SODIUM 175 MCG: 25 TABLET ORAL at 08:10

## 2019-03-13 RX ADMIN — ASPIRIN 81 MG: 81 TABLET, COATED ORAL at 08:10

## 2019-03-13 RX ADMIN — CHLORHEXIDINE GLUCONATE 15 ML: 1.2 RINSE ORAL at 08:10

## 2019-03-13 RX ADMIN — TORSEMIDE 100 MG: 100 TABLET ORAL at 08:10

## 2019-03-13 RX ADMIN — LISINOPRIL 2.5 MG: 2.5 TABLET ORAL at 08:09

## 2019-03-13 RX ADMIN — TAMSULOSIN HYDROCHLORIDE 0.4 MG: 0.4 CAPSULE ORAL at 08:10

## 2019-03-13 RX ADMIN — METOPROLOL SUCCINATE 25 MG: 25 TABLET, EXTENDED RELEASE ORAL at 20:50

## 2019-03-13 RX ADMIN — WARFARIN SODIUM 17.5 MG: 2.5 TABLET ORAL at 17:38

## 2019-03-13 RX ADMIN — SODIUM CHLORIDE, POTASSIUM CHLORIDE, SODIUM LACTATE AND CALCIUM CHLORIDE 500 ML: 600; 310; 30; 20 INJECTION, SOLUTION INTRAVENOUS at 17:30

## 2019-03-13 NOTE — PROVIDER NOTIFICATION
MD paged for HR 180s-230s working with PT. Monitored on tele, highest . Telemetry strips printed showing HR.

## 2019-03-13 NOTE — PROGRESS NOTES
CLINICAL NUTRITION SERVICES    Reviewed nutrition risk factors due to LOS. Pt is tolerating diet, eating well per nursing documentation. No nutrition issues identified at this time. RD will follow per protocol at this time, unless consulted.    Kymberly Toure RD/SELENA  Pager [phone]

## 2019-03-13 NOTE — PLAN OF CARE
"OT/5A:   Discharge Planner OT   Patient plan for discharge: Rehab  Current status: Providers and RN aware of elevated HR's. Monitoring patient's symptoms and tele monitor closely during session, with RN nearby. Reporting to have completed transfer to bedside commode with nursing staff yesterday and agreeable to continued instruction/assessment. Min-Mod Ax2 supine <> EOB. HR varying 130-170 at EOB with patient denying symptoms. Min Ax2 stand at EOB. Close CGA-Min Ax2 to complete standing 180 degree turn to commode, with heavy UE support on 2WW and shoes donned. 's-210's with standing activity. One instance of  during initial transfer. Patient seated safely on commode and educated on recommendation to discontinue activity. RN paging team. Min Ax2 sit <> stand from commode; CGA-Min A to complete transfer back to EOB. Patient denying symptoms other than brief feeling of \"flushing\" during stand from commode. Mod Ax2 supine <> EOB. HR decreasing to 120's-130's when returning supine, though increasing to 140's-150's when talking. /63 (Map 87).   Barriers to return to prior living situation: Medical Status, HR, Activity Tolerance, Strength  Recommendations for discharge: TCU  Rationale for recommendations: Patient would benefit from ongoing therapies to progress ADL/Mobility independence and safety. Ability to progress activity with therapies limited by significantly elevated HR's.        Entered by: Gladys Medellin 03/13/2019 11:16 AM       "

## 2019-03-13 NOTE — PROGRESS NOTES
[facility], [place]  Progress Note - Maroon 1 Service   Date of Admission:  3/6/2019    Assessment & Plan   Clarke Moreira is a 68 year old male with PMH of afib on warfarin, HFpEF, T2DM, and recent dental work (2/26/19) who is admitted on 3/6/2019 with severe sepsis and MICHELLE. Likely 2/2 sinusitis and possible contribution of excessive diuresis/poor PO intake.  Infection improved.      Today:  Will add additional 25mg metoprolol dose due to recurrent Afib w/ RVR   Working on PT   Continue torsemide and spironolactone   INR remains sub therapeutic   Discharge pending improved rate control     Sever sepsis: Resolved  Recent dental procedures with tooth extraction 2/26/19:  Left Maxillary sinusitis: Sepsis resolved.  Suspect oral/sinusitus as source given timing and CT findings.  Completed course of augmentin.  - ECHO without vegitations  - blood cultures, NGTD  - MRSA nares negative  - OMFS consulted, appreciate recs   Stopped vanc/zosyn 3/6-3/9   PO Augmentin 3/9-12    Sinus precautions for 6 weeks    Follow up with dentist as needed     Chlorhexidine BID 10 days till 3/17    Saline nasal spray PNR     MICHELLE: Baseline creatinine 1.0.  Increased to 4.15 on admission, downtrending after fluids. Retroperitoneal US without hydro or obvious parenchymal changes. Now back to near baseline.  Up titrating home meds  - PRN bladder scan/cath  - PTA tamsulosin resumed (if concerned for orthostasis may need to consider decrease)    Diabetic foot ulcers:  Hx PAD:  Last VICK 9/24/17 with possible occlusion of right anterior tibial artery and dorsalis pedis.  Black eschar on b/l great toes on admission.  Last A1c 6.1, CRP 5.9, ESR 19, less likely osteomyelitis given nl labs.  At this time suspect that sepsis more likely 2/2 dental procedure as above.  S/p bedside debridement by podiatry.    - Daily dressing changes, keep clean/dry  - WBAT w/ protective footwear for transfers and PT  - Podiatry consulted,  appreciate recs    Follow up in podiatry clinic 1-2 weeks after discharge     WBAT    Offloading boots     HFpEF:   HTN:   Supratherapeutic INR:  Afib s/p ablation 2008 on warfarin: Prior systolic heart failure, most recent echo 10/2018 with EF 55-60%. CXR with pulmonary venous congestion, improved when compared to 1/5/2018, trace left pleural effusion and mild bibasilar atelectasis. BNP elevated on admit 2,329. Troponin 0.00.  Afib with RVR to 200s 3/8.  [id number] 4. INR 3.92 on admission.  Intermittent RVR continues.    - warfarin dosing per pharmacy  - Increased PTA metoprolol succinate to 100mg 3/12, will consider additional increase   For HR >200 elicia CEJA and obtain ECG  - PTA torsemide (reduced dose of 100mg) 150 mg BID   - PTA Spironolactone 25 mg daily  - PTA lisinopril (dose reduced to 2.5)  - Resumed potassium 40 daily.  May need to increase to 60, patient prefers CR    Will monitor   - PT/OT consults, strength and ADLs, and lymphedema  - Telemetry  - Follows in core clinic     Prior CVA:  CT head, MRI/MRA brain with no acute infarct. Noted some leukoaraiosis, b/l occipital encephalomalacia likely prior infarct. Chronic infarct noted in cerebellum.  - Neurology consulted in ED, signed off 3/6/19    Left temple lesion:   Hx BCC: Site of prior skin biopsy/exisino.  Per patient has become chronic, never fully heals and bleeds easily.  Path from 2014 consistent with BCC.  Concerning for incomplete resection/marjolin's ulcer?    - Outpatient follow up with PCP or dermatology    Chronic Issues:  THONG:  CPAP used at home, patient declines CPAP here in hospital, does not like our masks  DMII:  Last A1c 6.1 on 2/26/19.  Will plan to resume PTA metformin at discharge  Hypothyroidism: PTA levothyroxine  Depression/anxiety: NTD     Diet: Consistent Carbohydrate Diet 9827-6942 Calories: Moderate Consistent CHO (4-6 CHO units/meal)    Fluids: IVF boluses PRN  Lines: PIV  DVT Prophylaxis: PTA Warfarin, pharmacy to  "dose  Bazzi Catheter: not present  Code Status: DNR/DNI  patient would like to discuss pressors if he needs this, if he is unable to discuss pressors because he is so altered/hypotensive, then he would NOT want pressors and the medical team should \"just let me go\"    Disposition Plan   Expected discharge: 1-2 days, recommended to TCU once tolerating home meds and BPs/HRs stable  Entered: Jesús Olivas MD 03/13/2019, 6:33 AM     MD Maria Ren 1 Service  [facility], [place]  Pager: [phone]  Please see sticky note for cross cover information  ______________________________________________________________________    Interval History    Cares going well  HR into 190s with exertion  Metoprolol dose increased  Felt clamy with periods of tachycardia  Denies fevers, chills, lightheadedness.  No chest pain  Reviewed plan, patient updated and questions answered at bedside.        Data reviewed today: I reviewed all medications, new labs and imaging results over the last 24 hours.    Physical Exam   Vital Signs: Temp: 95.7  F (35.4  C)(pt stated they had ice after i took temp) Temp src: Oral BP: 90/53   Heart Rate: 91 Resp: 16 SpO2: 100 % O2 Device: Nasal cannula Oxygen Delivery: 2 LPM  Weight: 375 lbs 10.62 oz    GEN: Adult male, resting supine in bed. NAD  HEENT: PERRL, no conjunctival injection, anicteric.  No discharge visible at the nares, left paranasal tenderness to palpation improved from prior  CV: Tachy, irregularly irregular, NMRG. Warm, well-perfused extremities, unable to appreciate JVD  RESP: CTAB from the anterior, normal WOB  GI: soft, non-tender, no masses or organomegaly appreciated.  Normal bowel sounds.  MSK: Right great toe with dressing in place, C/D/I.  Bilateral legs with trace non pitting edema and chronic appearing coloration changes  Skin: Warm, dry. Left temple lesion with depressed center and raised borders with area of scabbing unchanged.    Neuro: " Alert, CNs II-XII grossly intact. Sensation and motor function of extremities grossly intact.   Psych: Appropriate mood and affect.    CMP  Recent Labs   Lab 03/12/19  1831 03/12/19  0625 03/11/19  2102 03/11/19  0452  03/10/19  0520 03/09/19  0423  03/06/19  1110    140 139 143  --  143 145*   < > 136   POTASSIUM 3.6 3.8 3.3* 3.7   < > 3.8 4.1   < > 4.7   CHLORIDE 104 104 103 105  --  112* 112*   < > 99   CO2 29 26 24 27  --  24 27   < > 24   ANIONGAP 7 11 12 11  --  6 6   < > 13   * 135* 173* 112*  --  117* 106*   < > 104*   BUN 19 15 17 12  --  13 21   < > 64*   CR 1.19 1.21 1.22 1.04  --  1.08 1.51*   < > 4.15*   GFRESTIMATED 62 61 60* 73  --  70 47*   < > 14*   GFRESTBLACK 72 70 70 85  --  81 54*   < > 16*   CHERI 8.9 8.7 8.2* 8.6  --  8.9 8.4*   < > 9.6   MAG  --  1.6  --  1.7  --  1.9 1.7   < >  --    PROTTOTAL  --   --   --   --   --   --   --   --  7.5   ALBUMIN  --   --   --   --   --   --   --   --  3.5   BILITOTAL  --   --   --   --   --   --   --   --  0.5   ALKPHOS  --   --   --   --   --   --   --   --  105   AST  --   --   --   --   --   --   --   --  14   ALT  --   --   --   --   --   --   --   --  18    < > = values in this interval not displayed.     CBC  Recent Labs   Lab 03/12/19  0625 03/11/19  0452 03/10/19  0520 03/09/19  0423   WBC 8.9 7.2 7.0 6.5   RBC 4.36* 4.39* 4.23* 4.30*   HGB 13.5 13.4 13.0* 13.3   HCT 41.5 42.0 41.2 41.7   MCV 95 96 97 97   MCH 31.0 30.5 30.7 30.9   MCHC 32.5 31.9 31.6 31.9   RDW 12.8 13.0 13.2 13.2    197 206 195     INR  Recent Labs   Lab 03/12/19  0625 03/11/19  0452 03/10/19  0520 03/09/19  0423   INR 1.54* 1.41* 1.41* 2.01*

## 2019-03-13 NOTE — PLAN OF CARE
Patient alert and oriented x4. Soft BP's 90/53, 102/47. Tachycardic while up to BSC. 120-170's. Patient denies dizziness or chest pain.BG's 127 and 143. Voiding in urinal. Up with assist x2 to BSC BLE edema. Right toe dressing clean, dry and intact. Slept between cares.

## 2019-03-13 NOTE — PLAN OF CARE
0700 - 1900     Reason for admission: hypotension, sepsis, MICHELLE  Vitals: VSS ex tachycardic on 2 L O2 NC  Activity: assist x2 + walker + gait belt  Pain: c/o some pain in feet when ambulating, declines interventions  Neuro: WNL  Cardiac: on tele, a fib around 90-100s at rest, a fib with RVR with HR up to 235 with ambulating to BSC - MDs aware  Respiratory: on 2 L O2 NC baseline  GI/: obese, using urinal independently @ bedside, last BM today  Diet: Mod consistent CHO, good appetite   Lines: R PIV SL, O2, tele  Wounds: R toe drsg changed, L toe scab, scars, bruises  Labs/imaging: K 4.0, Mg 2.0, WBC 8.9, INR 1.68, BGs stable      New changes this shift: wound drsg changed on R toe x1, MDs made aware of HR increase - higher dose metoprolol ordered but pt became hypotensive, MDs paged and RRT called. Pt triggered sepsis with low BP and high HR - LA 1.5, 500 ml LR bolus verbal ordered, given over 2 hrs - BP rechecks still lower (<100 SBP), warfarin given      Plan: discharge [facility] TCU when stable      Continue to monitor and follow POC

## 2019-03-13 NOTE — PROGRESS NOTES
03/13/19 1000   General Information   Discipline OT   Onset of Edema (acute on chronic)   Affected Body Part(s) Right LE;Left LE   Etiology Comments chronic lymphedema, decreased mobility, wounds.   Edema Precaution Comments Wound dressing present on R great toe.   Pain   Pain comments patient reports no pain in LEs at this time.   Edema Examination / Assessment   Skin Condition Pitting;Dryness;Intact   Skin Condition Comments Skin intact with firm 1+ pitting throughout. Minor venous discoloration noted. Dressing in place and changed by RN on R great toe. Skin thickening at B heels.   ROM   Range of Motion (WFL) no deficits were identified   Strength   Strength (WFL) (generalized weakness throughout.)   Sensation   Sensation (WFL) no deficits were identified  (baseline numbness in feet.)   Assessment/Plan   Patient presents with Edema   Assessment Recommend continued use of compression wraps to manage acute on chronic LE edema. Patient has velcro garments used at baseline however not fitting appropriately at this time.   Assessment of Occupational Performance 1-3 Performance Deficits   Identified Performance Deficits Decreased functional mobility.   Clinical Decision Making (Complexity) Low complexity   Planned Edema Interventions Gradient compression bandaging;Fit for compression garment;Exercises;Precautions to prevent infection/exacerbation;Education   Treatment Frequency daily   Treatment Duration 1 week   Patient, Family and/or Staff in agreement with plan of care. Yes   Risks and benefits of treatment have been explained. Yes   Total Evaluation Time   Total Evaluation Time (Minutes) 5

## 2019-03-14 ENCOUNTER — APPOINTMENT (OUTPATIENT)
Dept: OCCUPATIONAL THERAPY | Facility: CLINIC | Age: 69
DRG: 856 | End: 2019-03-14
Payer: COMMERCIAL

## 2019-03-14 ENCOUNTER — APPOINTMENT (OUTPATIENT)
Dept: PHYSICAL THERAPY | Facility: CLINIC | Age: 69
DRG: 856 | End: 2019-03-14
Payer: COMMERCIAL

## 2019-03-14 LAB
ANION GAP SERPL CALCULATED.3IONS-SCNC: 8 MMOL/L (ref 3–14)
BUN SERPL-MCNC: 23 MG/DL (ref 7–30)
CALCIUM SERPL-MCNC: 9.1 MG/DL (ref 8.5–10.1)
CHLORIDE SERPL-SCNC: 104 MMOL/L (ref 94–109)
CO2 SERPL-SCNC: 28 MMOL/L (ref 20–32)
CREAT SERPL-MCNC: 1.22 MG/DL (ref 0.66–1.25)
ERYTHROCYTE [DISTWIDTH] IN BLOOD BY AUTOMATED COUNT: 13.2 % (ref 10–15)
GFR SERPL CREATININE-BSD FRML MDRD: 60 ML/MIN/{1.73_M2}
GLUCOSE BLDC GLUCOMTR-MCNC: 107 MG/DL (ref 70–99)
GLUCOSE BLDC GLUCOMTR-MCNC: 111 MG/DL (ref 70–99)
GLUCOSE BLDC GLUCOMTR-MCNC: 123 MG/DL (ref 70–99)
GLUCOSE BLDC GLUCOMTR-MCNC: 86 MG/DL (ref 70–99)
GLUCOSE BLDC GLUCOMTR-MCNC: 94 MG/DL (ref 70–99)
GLUCOSE SERPL-MCNC: 113 MG/DL (ref 70–99)
HCT VFR BLD AUTO: 43.6 % (ref 40–53)
HGB BLD-MCNC: 13.9 G/DL (ref 13.3–17.7)
INR PPP: 1.86 (ref 0.86–1.14)
INTERPRETATION ECG - MUSE: NORMAL
MAGNESIUM SERPL-MCNC: 2.1 MG/DL (ref 1.6–2.3)
MCH RBC QN AUTO: 30.8 PG (ref 26.5–33)
MCHC RBC AUTO-ENTMCNC: 31.9 G/DL (ref 31.5–36.5)
MCV RBC AUTO: 97 FL (ref 78–100)
PLATELET # BLD AUTO: 239 10E9/L (ref 150–450)
POTASSIUM SERPL-SCNC: 3.7 MMOL/L (ref 3.4–5.3)
RBC # BLD AUTO: 4.52 10E12/L (ref 4.4–5.9)
SODIUM SERPL-SCNC: 140 MMOL/L (ref 133–144)
WBC # BLD AUTO: 7.6 10E9/L (ref 4–11)

## 2019-03-14 PROCEDURE — 25000128 H RX IP 250 OP 636: Performed by: STUDENT IN AN ORGANIZED HEALTH CARE EDUCATION/TRAINING PROGRAM

## 2019-03-14 PROCEDURE — 00000146 ZZHCL STATISTIC GLUCOSE BY METER IP

## 2019-03-14 PROCEDURE — 85610 PROTHROMBIN TIME: CPT | Performed by: STUDENT IN AN ORGANIZED HEALTH CARE EDUCATION/TRAINING PROGRAM

## 2019-03-14 PROCEDURE — 97116 GAIT TRAINING THERAPY: CPT | Mod: GP

## 2019-03-14 PROCEDURE — 25000132 ZZH RX MED GY IP 250 OP 250 PS 637: Performed by: STUDENT IN AN ORGANIZED HEALTH CARE EDUCATION/TRAINING PROGRAM

## 2019-03-14 PROCEDURE — 40000133 ZZH STATISTIC OT WARD VISIT

## 2019-03-14 PROCEDURE — 97140 MANUAL THERAPY 1/> REGIONS: CPT | Mod: GO

## 2019-03-14 PROCEDURE — 85027 COMPLETE CBC AUTOMATED: CPT | Performed by: STUDENT IN AN ORGANIZED HEALTH CARE EDUCATION/TRAINING PROGRAM

## 2019-03-14 PROCEDURE — 97535 SELF CARE MNGMENT TRAINING: CPT | Mod: GO

## 2019-03-14 PROCEDURE — 25000132 ZZH RX MED GY IP 250 OP 250 PS 637

## 2019-03-14 PROCEDURE — 12000001 ZZH R&B MED SURG/OB UMMC

## 2019-03-14 PROCEDURE — 99233 SBSQ HOSP IP/OBS HIGH 50: CPT | Mod: GC | Performed by: INTERNAL MEDICINE

## 2019-03-14 PROCEDURE — 25000132 ZZH RX MED GY IP 250 OP 250 PS 637: Performed by: INTERNAL MEDICINE

## 2019-03-14 PROCEDURE — 83735 ASSAY OF MAGNESIUM: CPT | Performed by: STUDENT IN AN ORGANIZED HEALTH CARE EDUCATION/TRAINING PROGRAM

## 2019-03-14 PROCEDURE — 97530 THERAPEUTIC ACTIVITIES: CPT | Mod: GP

## 2019-03-14 PROCEDURE — 80048 BASIC METABOLIC PNL TOTAL CA: CPT | Performed by: STUDENT IN AN ORGANIZED HEALTH CARE EDUCATION/TRAINING PROGRAM

## 2019-03-14 PROCEDURE — 36415 COLL VENOUS BLD VENIPUNCTURE: CPT | Performed by: STUDENT IN AN ORGANIZED HEALTH CARE EDUCATION/TRAINING PROGRAM

## 2019-03-14 RX ORDER — METOPROLOL SUCCINATE 50 MG/1
100 TABLET, EXTENDED RELEASE ORAL DAILY
Status: DISCONTINUED | OUTPATIENT
Start: 2019-03-15 | End: 2019-03-22 | Stop reason: HOSPADM

## 2019-03-14 RX ADMIN — ATORVASTATIN CALCIUM 40 MG: 40 TABLET, FILM COATED ORAL at 21:11

## 2019-03-14 RX ADMIN — CHLORHEXIDINE GLUCONATE 15 ML: 1.2 RINSE ORAL at 08:26

## 2019-03-14 RX ADMIN — Medication 75 MG: at 15:59

## 2019-03-14 RX ADMIN — CHLORHEXIDINE GLUCONATE 15 ML: 1.2 RINSE ORAL at 18:49

## 2019-03-14 RX ADMIN — TAMSULOSIN HYDROCHLORIDE 0.4 MG: 0.4 CAPSULE ORAL at 08:25

## 2019-03-14 RX ADMIN — TORSEMIDE 100 MG: 100 TABLET ORAL at 05:47

## 2019-03-14 RX ADMIN — METOPROLOL SUCCINATE 125 MG: 25 TABLET, EXTENDED RELEASE ORAL at 08:24

## 2019-03-14 RX ADMIN — SODIUM CHLORIDE 500 ML: 9 INJECTION, SOLUTION INTRAVENOUS at 14:59

## 2019-03-14 RX ADMIN — LEVOTHYROXINE SODIUM 175 MCG: 25 TABLET ORAL at 08:25

## 2019-03-14 RX ADMIN — WARFARIN SODIUM 17.5 MG: 2.5 TABLET ORAL at 18:49

## 2019-03-14 RX ADMIN — ASPIRIN 81 MG: 81 TABLET, COATED ORAL at 08:26

## 2019-03-14 RX ADMIN — POTASSIUM CHLORIDE 40 MEQ: 750 TABLET, EXTENDED RELEASE ORAL at 08:26

## 2019-03-14 ASSESSMENT — ACTIVITIES OF DAILY LIVING (ADL)
ADLS_ACUITY_SCORE: 19

## 2019-03-14 NOTE — PLAN OF CARE
PT 5A:     Discharge Planner PT   Patient plan for discharge: Did not discuss today  Current status: Engaged pt in bed mobility at min A x 1-2, sit <> stand transfers from elevated surfaces at CGA x 2 and min A x 2 from lower surfaces with use of FWW, dependent for татьяна-cares, and gait ~11ft x 2 with FWW at CGA. HR 70-110bpm at rest and up to 160-190bpm with activity (asymptomatic today).   Barriers to return to prior living situation: medical status, home set up, edema  Recommendations for discharge: TCU  Rationale for recommendations: Pt remains below his baseline for mobility and would benefit from continued rehab to return to PLOF. Pt is limited by poor activity tolerance, weakness, and impaired dynamic balance.        Entered by: Elizabeth De La Fuente 03/14/2019 10:51 AM

## 2019-03-14 NOTE — PLAN OF CARE
Discharge Planner OT   Patient plan for discharge: Rehab  Current status: Pt received supine in bed, politely declined OOB activity due to waiting on meal tray. Agreeable to in bed activity. Provided education, handouts and demo for use of AE, benefits of use and how to obtain. Provided education on EC/WS and pacing strategies for fatigue management. Pt receptive.  Barriers to return to prior living situation: Medical Status, HR, Activity Tolerance, Strength  Recommendations for discharge: Per plan established by the OT, the recommendation for dc location is TCU  Rationale for recommendations: Patient would benefit from ongoing therapies to progress ADL/Mobility independence and safety. Ability to progress activity with therapies limited by significantly elevated HR's.

## 2019-03-14 NOTE — PLAN OF CARE
"Reason for admission: hypotension  Vitals: BP 98/46 (BP Location: Left leg)   Pulse 100   Temp 95.8  F (35.4  C) (Oral)   Resp 18   Ht 1.803 m (5' 11\")   Wt (!) 170.4 kg (375 lb 10.6 oz)   SpO2 99%   BMI 52.39 kg/m    Activity: A2  Pain: Denies  Neuro: AOx4,   Cardiac: tele afib and intermittent tachy (never sustained this shift).   Respiratory: stable on 2 L NC   GI/: Last BM 3/14, Voiding per urinal  Diet: CHO, BG checks before meals and at bedtime.  Lines: PIV SL  Wounds: Lymph wraps on BLE- Wound care on R Toe completed this shift.   Labs/imaging: INR of 1.86- on coumadin.  New changes this shift: Bed bath, BP monitored closely,   Plan: Will continue to monitor and follow POC.        "

## 2019-03-14 NOTE — PLAN OF CARE
Edema 5A: Pt with GCB donned and intact. Pt with 1+ soft pitting in B LEs, skin cares completed and GCB reapplied with plans to wear x 48 hours, however, please remove if compression causes numbness, tingling, pain, or becomes soiled. Will follow up. Nursing can complete wound cares daily to R great toe without wraps being removed.

## 2019-03-14 NOTE — PLAN OF CARE
Pt admitted for hypotension. Pt on tele and has been showing afib and intermittent tachy with exertion. Pt's highest HR was while working with PT. HR was 198- RN notified team. HR decreased to 90's shortly after pt got back into bed. RN gave 125 mg of metoprolol this AM after checking if BP was stable- see MAR. RN rechecked BP throughout shift due to hypotension the previous day. Pt's BP around 1400 PM was 86/35. RN paged team and per MD request started 500 mL bolus running at 250 mL/hr through PIV. Pt has been asymptomatic throughout shift. Tolerating CHO diet. 1 large BM on shift. Voiding in urinal. BG checks before meals and at bedtime. Lymph wraps on BLE- changed this AM. RN passed on wound care to oncoming RN. INR of 1.86- on coumadin. Will continue to monitor and follow POC.

## 2019-03-14 NOTE — PLAN OF CARE
[id number]: A&Ox4, afebrile, on telemetry, Afib (HR 70s-100s), BPs hypotensive at times (90s-110s/40s-50s), AOVSS on 2L O2 (pt baseline) via NC. Denies pain or nausea. Dyspnea on exertion. LR bolus completed, RPIV SL. Lymphedema wraps on BLE in place. R toe wound w/ foam dressing, CDI. L toe wound LANG. Using urinal, good UOP. Pt gets up to BSC w/ A2+BG/walker, did not get OOB this shift.     Pt systolic BPs held >100 for 3 successive checks, so writer administered metoprolol 25mg dose that was held on previous shift. Following this dose, BPs held steady w/ systolic BPs in mid-90s to low 100s, and HR ranged from 60s to low 90s. Continue to monitor BPs and HR closely.

## 2019-03-14 NOTE — PROVIDER NOTIFICATION
03/13/19 1700   Call Information   Date of Call 03/13/19   Time of Call 1658   Name of person requesting the team Anne Marie Stout   Title of person requesting team RN   RRT Arrival time 1659   Time RRT ended 1731   Reason for call   Type of RRT Adult   Primary reason for call Cardiovascular   Cardiovascular SBP less than 90   Was patient transferred from the ED, ICU, or PACU within last 24 hours prior to RRT call? No   SBAR   Situation BP 67/49 with MAP 59.     Background Hx Afib with RVR, DM,  sepsis, MICHELLE, sinusitis.   Notable History/Conditions Cardiac;Diabetes   Assessment Pt. A+O x 4.  Afebrile.  .  BP initially 67/49.   IV LR bolus of 500 ml over 2 hours initiated.  BP currently 90/58 with MAP 71.  Pt. denies pain or SOB.  Other vs at baseline.  Easy respirations.    Interventions Fluid bolus;Meds;Portable monitor   Patient Outcome   Patient Outcome Stabilized on unit   RRT Team   Attending/Primary/Covering Physician Jesús Olivas   Date Attending Physician notified 03/13/19   Time Attending Physician notified 1700   Lead RN Marlys Ch RN   RN Anne Marie Stout   RT Ifeoma MIGUEL   Post RRT Intervention Assessment   Post RRT Assessment Stable/Improved   Date Follow Up Done 03/13/19   Time Follow Up Done 1958   Comments BP at baseline. Other vs and assessments at baseline.

## 2019-03-14 NOTE — PLAN OF CARE
Reason for admission: Sepsis and MICHELLE.    Neuro: A&Ox4.   Activity: Ax2 when out of bed.    Vitals: VSS on 2L NC. BP stable now at 114/60.    LDAS: R PIV SL.   Cardiac: Tele on, Afib. HR at rest 70s.   Respiratory: LS clear. Sating in upper 90s.     GI/: Urinal voiding w adequate UOP. No BM this shift.    Skin: R toe dressing CDI. Lymph wraps on.    Pain: Denies pain.   Diet: Tolerating consistent carb diet.   Blood glucose: 123 at 0200.   Plan: Continue to monitor heart rate and blood pressure. Continue to monitor and follow POC.

## 2019-03-15 ENCOUNTER — APPOINTMENT (OUTPATIENT)
Dept: OCCUPATIONAL THERAPY | Facility: CLINIC | Age: 69
DRG: 856 | End: 2019-03-15
Payer: COMMERCIAL

## 2019-03-15 ENCOUNTER — APPOINTMENT (OUTPATIENT)
Dept: PHYSICAL THERAPY | Facility: CLINIC | Age: 69
DRG: 856 | End: 2019-03-15
Payer: COMMERCIAL

## 2019-03-15 LAB
ANION GAP SERPL CALCULATED.3IONS-SCNC: 9 MMOL/L (ref 3–14)
BUN SERPL-MCNC: 24 MG/DL (ref 7–30)
CALCIUM SERPL-MCNC: 8.8 MG/DL (ref 8.5–10.1)
CHLORIDE SERPL-SCNC: 103 MMOL/L (ref 94–109)
CO2 SERPL-SCNC: 27 MMOL/L (ref 20–32)
CREAT SERPL-MCNC: 1.14 MG/DL (ref 0.66–1.25)
ERYTHROCYTE [DISTWIDTH] IN BLOOD BY AUTOMATED COUNT: 12.8 % (ref 10–15)
GFR SERPL CREATININE-BSD FRML MDRD: 65 ML/MIN/{1.73_M2}
GLUCOSE BLDC GLUCOMTR-MCNC: 109 MG/DL (ref 70–99)
GLUCOSE BLDC GLUCOMTR-MCNC: 112 MG/DL (ref 70–99)
GLUCOSE BLDC GLUCOMTR-MCNC: 130 MG/DL (ref 70–99)
GLUCOSE BLDC GLUCOMTR-MCNC: 130 MG/DL (ref 70–99)
GLUCOSE BLDC GLUCOMTR-MCNC: 99 MG/DL (ref 70–99)
GLUCOSE SERPL-MCNC: 120 MG/DL (ref 70–99)
HCT VFR BLD AUTO: 43.6 % (ref 40–53)
HGB BLD-MCNC: 13.9 G/DL (ref 13.3–17.7)
INR PPP: 2.18 (ref 0.86–1.14)
MAGNESIUM SERPL-MCNC: 2 MG/DL (ref 1.6–2.3)
MCH RBC QN AUTO: 30.3 PG (ref 26.5–33)
MCHC RBC AUTO-ENTMCNC: 31.9 G/DL (ref 31.5–36.5)
MCV RBC AUTO: 95 FL (ref 78–100)
PLATELET # BLD AUTO: 242 10E9/L (ref 150–450)
POTASSIUM SERPL-SCNC: 3.6 MMOL/L (ref 3.4–5.3)
POTASSIUM SERPL-SCNC: 3.6 MMOL/L (ref 3.4–5.3)
RBC # BLD AUTO: 4.58 10E12/L (ref 4.4–5.9)
SODIUM SERPL-SCNC: 139 MMOL/L (ref 133–144)
WBC # BLD AUTO: 9.1 10E9/L (ref 4–11)

## 2019-03-15 PROCEDURE — 84132 ASSAY OF SERUM POTASSIUM: CPT

## 2019-03-15 PROCEDURE — 97530 THERAPEUTIC ACTIVITIES: CPT | Mod: GP

## 2019-03-15 PROCEDURE — 85610 PROTHROMBIN TIME: CPT | Performed by: STUDENT IN AN ORGANIZED HEALTH CARE EDUCATION/TRAINING PROGRAM

## 2019-03-15 PROCEDURE — 83735 ASSAY OF MAGNESIUM: CPT | Performed by: STUDENT IN AN ORGANIZED HEALTH CARE EDUCATION/TRAINING PROGRAM

## 2019-03-15 PROCEDURE — 25000132 ZZH RX MED GY IP 250 OP 250 PS 637: Performed by: INTERNAL MEDICINE

## 2019-03-15 PROCEDURE — 25000132 ZZH RX MED GY IP 250 OP 250 PS 637: Performed by: STUDENT IN AN ORGANIZED HEALTH CARE EDUCATION/TRAINING PROGRAM

## 2019-03-15 PROCEDURE — 97116 GAIT TRAINING THERAPY: CPT | Mod: GP

## 2019-03-15 PROCEDURE — 12000001 ZZH R&B MED SURG/OB UMMC

## 2019-03-15 PROCEDURE — 40000133 ZZH STATISTIC OT WARD VISIT

## 2019-03-15 PROCEDURE — 85027 COMPLETE CBC AUTOMATED: CPT | Performed by: STUDENT IN AN ORGANIZED HEALTH CARE EDUCATION/TRAINING PROGRAM

## 2019-03-15 PROCEDURE — 97535 SELF CARE MNGMENT TRAINING: CPT | Mod: GO

## 2019-03-15 PROCEDURE — 97530 THERAPEUTIC ACTIVITIES: CPT | Mod: GO

## 2019-03-15 PROCEDURE — 36415 COLL VENOUS BLD VENIPUNCTURE: CPT | Performed by: STUDENT IN AN ORGANIZED HEALTH CARE EDUCATION/TRAINING PROGRAM

## 2019-03-15 PROCEDURE — 99232 SBSQ HOSP IP/OBS MODERATE 35: CPT | Mod: GC | Performed by: INTERNAL MEDICINE

## 2019-03-15 PROCEDURE — 36415 COLL VENOUS BLD VENIPUNCTURE: CPT

## 2019-03-15 PROCEDURE — 00000146 ZZHCL STATISTIC GLUCOSE BY METER IP

## 2019-03-15 PROCEDURE — 80048 BASIC METABOLIC PNL TOTAL CA: CPT | Performed by: STUDENT IN AN ORGANIZED HEALTH CARE EDUCATION/TRAINING PROGRAM

## 2019-03-15 PROCEDURE — 25000132 ZZH RX MED GY IP 250 OP 250 PS 637

## 2019-03-15 RX ORDER — WARFARIN SODIUM 5 MG/1
15 TABLET ORAL
Status: COMPLETED | OUTPATIENT
Start: 2019-03-15 | End: 2019-03-15

## 2019-03-15 RX ADMIN — WARFARIN SODIUM 15 MG: 5 TABLET ORAL at 18:22

## 2019-03-15 RX ADMIN — Medication 75 MG: at 08:17

## 2019-03-15 RX ADMIN — CHLORHEXIDINE GLUCONATE 15 ML: 1.2 RINSE ORAL at 08:16

## 2019-03-15 RX ADMIN — ATORVASTATIN CALCIUM 40 MG: 40 TABLET, FILM COATED ORAL at 22:06

## 2019-03-15 RX ADMIN — LEVOTHYROXINE SODIUM 175 MCG: 25 TABLET ORAL at 08:17

## 2019-03-15 RX ADMIN — ASPIRIN 81 MG: 81 TABLET, COATED ORAL at 08:17

## 2019-03-15 RX ADMIN — Medication 75 MG: at 16:22

## 2019-03-15 RX ADMIN — TAMSULOSIN HYDROCHLORIDE 0.4 MG: 0.4 CAPSULE ORAL at 08:17

## 2019-03-15 RX ADMIN — CHLORHEXIDINE GLUCONATE 15 ML: 1.2 RINSE ORAL at 19:42

## 2019-03-15 RX ADMIN — METOPROLOL SUCCINATE 100 MG: 50 TABLET, EXTENDED RELEASE ORAL at 08:17

## 2019-03-15 RX ADMIN — POTASSIUM CHLORIDE 40 MEQ: 750 TABLET, EXTENDED RELEASE ORAL at 08:16

## 2019-03-15 ASSESSMENT — ACTIVITIES OF DAILY LIVING (ADL)
ADLS_ACUITY_SCORE: 18
ADLS_ACUITY_SCORE: 19
ADLS_ACUITY_SCORE: 18

## 2019-03-15 ASSESSMENT — MIFFLIN-ST. JEOR: SCORE: 2510.13

## 2019-03-15 NOTE — PROGRESS NOTES
[facility] [place]  Progress Note - Maroon 1 Service   Date of Admission:  3/6/2019    Assessment & Plan   Clarke Moreira is a 68 year old male with PMH of afib on warfarin, HFpEF, T2DM, and recent dental work (2/26/19) who is admitted on 3/6/2019 with severe sepsis and MICHELLE. Likely 2/2 sinusitis and possible contribution of excessive diuresis/poor PO intake.  Infection resolved.  Remains inpatient due to arrhythmia and intermittent hypotension.       Today:  Improving hemodynamics  Monitoring tachycardia/afib w/ RVR during exertion  Nearing ready for discharge to TCU    Supratherapeutic INR: Resolved  Afib s/p ablation 2008 on warfarin: Prior systolic heart failure, most recent echo 10/2018 with EF 55-60%. CXR with pulmonary venous congestion, improved when compared to 1/5/2018, trace left pleural effusion and mild bibasilar atelectasis. BNP elevated on admit 2,329. Troponin 0.00.  Afib with RVR to 200s 3/8.  [id number] 4. INR 3.92 on admission.  Intermittent RVR continues.    - warfarin dosing per pharmacy  - metoprolol succinate to 100mg    For HR >200 elicia CEJA and obtain ECG  - PT/OT consults, strength and ADLs, and lymphedema  - Telemetry    Orthostatic hypotension  He appears euvolemic. At times it is related to increased heart rate from a-fib so could be induced by arrhythmia. Also suspect venous insufficiency as it improves with leg wraps.  - Reduced home BP meds  - Continue with leg wraps  - Orthostatics BID    Severe sepsis: Resolved  Recent dental procedures with tooth extraction 2/26/19:  Left Maxillary sinusitis: Sepsis resolved.  Suspect oral/sinusitus as source given timing and CT findings.  Completed course of augmentin.  - ECHO without vegetations  - blood cultures, NGTD  - MRSA nares negative  - OMFS consulted, appreciate recs   Stopped vanc/zosyn 3/6-3/9   PO Augmentin 3/9-12, complete    Sinus precautions for 6 weeks     MICHELLE: Baseline creatinine 1.0.  Increased to  "4.15 on admission, downtrending after fluids. Retroperitoneal US without hydro or obvious parenchymal changes. Now back to near baseline.  Up titrating home meds  - PRN bladder scan/cath  - PTA tamsulosin resumed (if concerned for orthostasis may need to consider decrease)    Left temple lesion:   Hx BCC: Site of prior skin biopsy/exisino.  Per patient has become chronic, never fully heals and bleeds easily.  Path from 2014 consistent with BCC.  Concerning for incomplete resection/marjolin's ulcer?    - Outpatient follow up with PCP or dermatology    Chronic Issues:  Hx HFpEF:  Follows in [facility] clinic  Hx HTN:   - Reduced torsemide to 75mg BID  - Holding on Spironolactone and lisinopril  - Potassium 40 daily.  May need to increase to 60, patient prefers CR  Diabetic foot ulcers:  Hx PAD:  S/p bedside debridement by podiatry.    - Daily dressing changes, keep clean/dry  - WBAT w/ protective footwear for transfers and PT  - Follow up in podiatry clinic 1-2 weeks after discharge   WBAT    Offloading boots  Prior CVA:  CT head, MRI/MRA brain with no acute infarct  THONG:  CPAP used at home, patient declines CPAP here in hospital, does not like our masks  DMII:  Last A1c 6.1 on 2/26/19.  Will plan to resume PTA metformin at discharge  Hypothyroidism: PTA levothyroxine  Depression/anxiety: NTD     Diet: Consistent Carbohydrate Diet [id number] Calories: Moderate Consistent CHO (4-6 CHO units/meal)    Fluids: IVF boluses PRN  Lines: PIV  DVT Prophylaxis: PTA Warfarin, pharmacy to dose  Bazzi Catheter: not present  Code Status: DNR/DNI  patient would like to discuss pressors if he needs this, if he is unable to discuss pressors because he is so altered/hypotensive, then he would NOT want pressors and the medical team should \"just let me go\"    Disposition Plan   Expected discharge: 1-2 days, recommended to TCU once tolerating home meds and BPs/HRs stable  Entered: Jesús Olivas MD 03/15/2019, 7:33 AM     Jesús Olivas, " MD  Internal Medicine PGY1   ______________________________________________________________________    Interval History    No overnight events  Still intermittently tachycardiac/in afib w rvr.  After fluid and decreased meds yesterday feels better and less lightheaded with exertion.  Otherwise 4-point ROS negative.      Data reviewed today: I reviewed all medications, new labs and imaging results over the last 24 hours.    Physical Exam   Vital Signs: Temp: 97.1  F (36.2  C) Temp src: Oral BP: 106/44 Pulse: 86 Heart Rate: 80 Resp: 18 SpO2: 100 % O2 Device: Nasal cannula Oxygen Delivery: 2 LPM  Weight: 375 lbs 10.62 oz    GEN: Adult male, resting supine in bed. NAD  HEENT: No discharge visible at the nares, left paranasal tenderness has resolved  CV: Tachy, irregularly irregular, NMRG. Warm, well-perfused extremities, unable to appreciate JVD  RESP: CTAB from the anterior, possibly slightly decreased at the bases, normal WOB  MSK: Right great toe with dressing in place, C/D/I.  Bilateral legs wrapped, edema improved from prior with wraps  Skin: Warm, dry. Left temple lesion with depressed center and raised borders with with decreased area of scabbing.

## 2019-03-15 NOTE — PROGRESS NOTES
Social Work Services Progress Note    Hospital Day: 10  Date of Initial Social Work Evaluation:    Collaborated with:   TCU, patient, RN    Data:  Clarke is a 68 year old male admitted to [facility] 3/6/19 with severe sepsis and MICHELLE. Now nearing ready to discharge and in need of tcu placement    Intervention:  SW followed up with  TCU informed them he is medically cleared to discharge. He is accepted for placement pending open bed.    Assessment: they do not anticipate any beds over the weekend. This is patients only choice of facility.    Plan:    Anticipated Disposition:  Facility:   TCU    Barriers to d/c plan:  Open bed    Follow Up:  royal following    AARON Esposito  5B  (Medical/Surgical)  Phone: [phone]  Pager: [phone]

## 2019-03-15 NOTE — PROGRESS NOTES
[facility], [place]  Progress Note - Maroon 1 Service   Date of Admission:  3/6/2019    Assessment & Plan   Clarke Moreira is a 68 year old male with PMH of afib on warfarin, HFpEF, T2DM, and recent dental work (2/26/19) who is admitted on 3/6/2019 with severe sepsis and MICHELLE. Likely 2/2 sinusitis and possible contribution of excessive diuresis/poor PO intake.  Infection improved.      Today:  Reduced metoprolol to 100  Measure orthostasis regularly  Reduced torsemide  Discharge pending on improved BP     Sever sepsis: Resolved  Recent dental procedures with tooth extraction 2/26/19:  Left Maxillary sinusitis: Sepsis resolved.  Suspect oral/sinusitus as source given timing and CT findings.  Completed course of augmentin.  - ECHO without vegetations  - blood cultures, NGTD  - MRSA nares negative  - OMFS consulted, appreciate recs   Stopped vanc/zosyn 3/6-3/9   PO Augmentin 3/9-12, complete    Sinus precautions for 6 weeks    Follow up with dentist as needed     Chlorhexidine BID 10 days till 3/17    Saline nasal spray PNR     MICHELLE: Baseline creatinine 1.0.  Increased to 4.15 on admission, downtrending after fluids. Retroperitoneal US without hydro or obvious parenchymal changes. Now back to near baseline.  Up titrating home meds  - PRN bladder scan/cath  - PTA tamsulosin resumed (if concerned for orthostasis may need to consider decrease)    Diabetic foot ulcers:  Hx PAD:  Last VICK 9/24/17 with possible occlusion of right anterior tibial artery and dorsalis pedis.  Black eschar on b/l great toes on admission.  Last A1c 6.1, CRP 5.9, ESR 19, less likely osteomyelitis given nl labs.  At this time suspect that sepsis more likely 2/2 dental procedure as above.  S/p bedside debridement by podiatry.    - Daily dressing changes, keep clean/dry  - WBAT w/ protective footwear for transfers and PT  - Podiatry consulted, appreciate recs    Follow up in podiatry clinic 1-2 weeks after discharge      WBAT    Offloading boots     HFpEF:   HTN:   Supratherapeutic INR:  Afib s/p ablation 2008 on warfarin: Prior systolic heart failure, most recent echo 10/2018 with EF 55-60%. CXR with pulmonary venous congestion, improved when compared to 1/5/2018, trace left pleural effusion and mild bibasilar atelectasis. BNP elevated on admit 2,329. Troponin 0.00.  Afib with RVR to 200s 3/8.  [id number] 4. INR 3.92 on admission.  Intermittent RVR continues.    - warfarin dosing per pharmacy  - Increased PTA metoprolol succinate to 100mg 3/12, will consider additional increase   For HR >200 elicia CEJA and obtain ECG  - Reduced torsemide to 75mg   - Continue hold on Spironolactone and lisinopril  - Resumed potassium 40 daily.  May need to increase to 60, patient prefers CR    Will monitor   - PT/OT consults, strength and ADLs, and lymphedema  - Telemetry  - Follows in core clinic     Prior CVA:  CT head, MRI/MRA brain with no acute infarct. Noted some leukoaraiosis, b/l occipital encephalomalacia likely prior infarct. Chronic infarct noted in cerebellum.  - Neurology consulted in ED, signed off 3/6/19    Left temple lesion:   Hx BCC: Site of prior skin biopsy/exisino.  Per patient has become chronic, never fully heals and bleeds easily.  Path from 2014 consistent with BCC.  Concerning for incomplete resection/marjolin's ulcer?    - Outpatient follow up with PCP or dermatology     Orthostatic hypotension  He appears euvolemic. At times it is related to increased heart rate from a-fib so could be induced by arrhythmia. Also suspect venous insufficiency as it improves with leg wraps.  - Continue with leg wraps  - Orthostatics BID    Chronic Issues:  THONG:  CPAP used at home, patient declines CPAP here in hospital, does not like our masks  DMII:  Last A1c 6.1 on 2/26/19.  Will plan to resume PTA metformin at discharge  Hypothyroidism: PTA levothyroxine  Depression/anxiety: NTD     Diet: Consistent Carbohydrate Diet 2618-1213 Calories:  "Moderate Consistent CHO (4-6 CHO units/meal)    Fluids: IVF boluses PRN  Lines: PIV  DVT Prophylaxis: PTA Warfarin, pharmacy to dose  Bazzi Catheter: not present  Code Status: DNR/DNI  patient would like to discuss pressors if he needs this, if he is unable to discuss pressors because he is so altered/hypotensive, then he would NOT want pressors and the medical team should \"just let me go\"    Disposition Plan   Expected discharge: 1-2 days, recommended to TCU once tolerating home meds and BPs/HRs stable  Entered: Roberto Alexander MD 03/14/2019, 9:44 PM     Roberto Alexander MD PhD  Internal Medicine,PGY3 ______________________________________________________________________    Interval History    No overnight events, patient was briefly hypotensive but asymptomatic. Happy that legs are wrapped. Participating with physical therapy. Otherwise 4-point ROS negative.      Data reviewed today: I reviewed all medications, new labs and imaging results over the last 24 hours.    Physical Exam   Vital Signs: Temp: 95.8  F (35.4  C) Temp src: Oral BP: 98/46 Pulse: 100 Heart Rate: 88 Resp: 18 SpO2: 99 % O2 Device: Nasal cannula Oxygen Delivery: 2 LPM  Weight: 375 lbs 10.62 oz    GEN: Adult male, resting supine in bed. NAD  HEENT: PERRL, no conjunctival injection, anicteric.  No discharge visible at the nares, left paranasal tenderness to palpation improved from prior  CV: Tachy, irregularly irregular, NMRG. Warm, well-perfused extremities, unable to appreciate JVD  RESP: CTAB from the anterior, normal WOB  GI: soft, non-tender, no masses or organomegaly appreciated.  Normal bowel sounds.  MSK: Right great toe with dressing in place, C/D/I.  Bilateral legs with trace non pitting edema and chronic appearing coloration changes  Skin: Warm, dry. Left temple lesion with depressed center and raised borders with area of scabbing unchanged.    Neuro: Alert, CNs II-XII grossly intact. Sensation and motor function of extremities grossly intact. "   Psych: Appropriate mood and affect.    CMP  Recent Labs   Lab 03/14/19  0617 03/13/19  1656 03/13/19  0613 03/12/19  1831 03/12/19 0625 03/11/19  0452     --  140 140 140   < > 143   POTASSIUM 3.7 4.0 4.0 3.6 3.8   < > 3.7   CHLORIDE 104  --  103 104 104   < > 105   CO2 28  --  29 29 26   < > 27   ANIONGAP 8  --  8 7 11   < > 11   *  --  113* 109* 135*   < > 112*   BUN 23  --  19 19 15   < > 12   CR 1.22  --  1.10 1.19 1.21   < > 1.04   GFRESTIMATED 60*  --  68 62 61   < > 73   GFRESTBLACK 70  --  79 72 70   < > 85   CHERI 9.1  --  9.0 8.9 8.7   < > 8.6   MAG 2.1  --  2.0  --  1.6  --  1.7    < > = values in this interval not displayed.     CBC  Recent Labs   Lab 03/14/19 0617 03/13/19 0613 03/12/19 0625 03/11/19 0452   WBC 7.6 8.9 8.9 7.2   RBC 4.52 4.49 4.36* 4.39*   HGB 13.9 13.7 13.5 13.4   HCT 43.6 43.1 41.5 42.0   MCV 97 96 95 96   MCH 30.8 30.5 31.0 30.5   MCHC 31.9 31.8 32.5 31.9   RDW 13.2 13.1 12.8 13.0    247 230 197     INR  Recent Labs   Lab 03/14/19 0617 03/13/19 0613 03/12/19 0625 03/11/19  0452   INR 1.86* 1.68* 1.54* 1.41*

## 2019-03-15 NOTE — PLAN OF CARE
PT 5A: Up with Ax1-2 using gait belt and FWW    Discharge Planner PT   Patient plan for discharge: rehab  Current status: Engaged pt in bed mobility at [facility] with HOB elevated and use of railing, sit <> stand transfers from slightly elevated surfaces at [facility] x 1-2 with FWW, gait training with FWW for ~50ft + 60ft at [facility] with WC follow. HR up to 193 bpm during gait (asymptomatic), quickly returns to 130s once sitting. Took orthostatic BPs (see vitals flow sheet). Pt on RA for all activity.   Barriers to return to prior living situation: medical status, HR, home set up (lives alone)  Recommendations for discharge: TCU  Rationale for recommendations: Pt is below baseline for mobility and would continue to benefit from rehab to return to PLOF. Pt is limited by impairments to strength, dynamic balance, and functional endurance.        Entered by: Elizabeth De La Fuente 03/15/2019 11:13 AM

## 2019-03-15 NOTE — PLAN OF CARE
Time: 7709-7426    Vitals: VSS on 2L via NC, soft BP, some tachycardia  Activity:  Assist x2, ambulated to br, worked with PT in ahuja.  Pain:  Denies pain, reports some soreness in shoulders  Neuro:  A&O x4, numbness and tingling in legs  Cardiac:  Tele monitor for afib, controlled rate.  Tachycardic intermittently with activity  Respiratory:  WDL  GI/:  Voiding spontaneously, 1 BM this shift  Diet:  CHO diet, tolerating well, BG with meals and bedtime  Lines:  Right PIV, SL  Skin:  Lymph wraps on legs for edema, wounds on bilateral feet, dressing change due 3/15  Plan: potential discharge tomorrow to TCU if BP and HR remain stable.  Will continue to monitor and Follow POC.

## 2019-03-15 NOTE — PLAN OF CARE
Pt alert and oriented, VSS on 2 L O2 via nasal cannula. Monitoring BPs- hypotensive at times. Up with 2 assist in room and to bathroom, voiding spontaneously and 1 BM this shift. BG overnight 130. Continuous pulse oximetry in place. CHO diet and tolerating well. Telemetry monitoring- A fib with controlled rate. Pt gets tachycardic at times with activity and during physical therapy. Order to start orthostatics 2 x daily today. Wound care completed yesterday, will need to be completed today. Right PIV saline locked. Lymph wraps on BLE. Continue with POC.

## 2019-03-15 NOTE — PLAN OF CARE
4455-5918: No changes. BGs <140. Ambulated to bathroom with assist of 2 and walker for a BM. Wound cares done. Lymph wraps in place. Ortho boots on when out of bed. Vitals stable.

## 2019-03-16 ENCOUNTER — APPOINTMENT (OUTPATIENT)
Dept: OCCUPATIONAL THERAPY | Facility: CLINIC | Age: 69
DRG: 856 | End: 2019-03-16
Payer: COMMERCIAL

## 2019-03-16 PROBLEM — Z78.9 ADEQUATE NUTRITION: Status: ACTIVE | Noted: 2019-03-16

## 2019-03-16 LAB
ANION GAP SERPL CALCULATED.3IONS-SCNC: 7 MMOL/L (ref 3–14)
BUN SERPL-MCNC: 26 MG/DL (ref 7–30)
CALCIUM SERPL-MCNC: 9 MG/DL (ref 8.5–10.1)
CHLORIDE SERPL-SCNC: 102 MMOL/L (ref 94–109)
CO2 SERPL-SCNC: 30 MMOL/L (ref 20–32)
CREAT SERPL-MCNC: 1.04 MG/DL (ref 0.66–1.25)
ERYTHROCYTE [DISTWIDTH] IN BLOOD BY AUTOMATED COUNT: 12.8 % (ref 10–15)
GFR SERPL CREATININE-BSD FRML MDRD: 73 ML/MIN/{1.73_M2}
GLUCOSE BLDC GLUCOMTR-MCNC: 108 MG/DL (ref 70–99)
GLUCOSE BLDC GLUCOMTR-MCNC: 109 MG/DL (ref 70–99)
GLUCOSE BLDC GLUCOMTR-MCNC: 120 MG/DL (ref 70–99)
GLUCOSE BLDC GLUCOMTR-MCNC: 91 MG/DL (ref 70–99)
GLUCOSE BLDC GLUCOMTR-MCNC: 96 MG/DL (ref 70–99)
GLUCOSE SERPL-MCNC: 110 MG/DL (ref 70–99)
HCT VFR BLD AUTO: 45.9 % (ref 40–53)
HGB BLD-MCNC: 14.7 G/DL (ref 13.3–17.7)
INR PPP: 2.19 (ref 0.86–1.14)
MAGNESIUM SERPL-MCNC: 2.1 MG/DL (ref 1.6–2.3)
MCH RBC QN AUTO: 30.9 PG (ref 26.5–33)
MCHC RBC AUTO-ENTMCNC: 32 G/DL (ref 31.5–36.5)
MCV RBC AUTO: 96 FL (ref 78–100)
PLATELET # BLD AUTO: 238 10E9/L (ref 150–450)
POTASSIUM SERPL-SCNC: 3.4 MMOL/L (ref 3.4–5.3)
RBC # BLD AUTO: 4.76 10E12/L (ref 4.4–5.9)
SODIUM SERPL-SCNC: 139 MMOL/L (ref 133–144)
WBC # BLD AUTO: 7.7 10E9/L (ref 4–11)

## 2019-03-16 PROCEDURE — 25000132 ZZH RX MED GY IP 250 OP 250 PS 637: Performed by: STUDENT IN AN ORGANIZED HEALTH CARE EDUCATION/TRAINING PROGRAM

## 2019-03-16 PROCEDURE — 25000132 ZZH RX MED GY IP 250 OP 250 PS 637

## 2019-03-16 PROCEDURE — 93005 ELECTROCARDIOGRAM TRACING: CPT

## 2019-03-16 PROCEDURE — 93010 ELECTROCARDIOGRAM REPORT: CPT | Performed by: INTERNAL MEDICINE

## 2019-03-16 PROCEDURE — 36415 COLL VENOUS BLD VENIPUNCTURE: CPT | Performed by: STUDENT IN AN ORGANIZED HEALTH CARE EDUCATION/TRAINING PROGRAM

## 2019-03-16 PROCEDURE — 99232 SBSQ HOSP IP/OBS MODERATE 35: CPT | Mod: GC | Performed by: INTERNAL MEDICINE

## 2019-03-16 PROCEDURE — 97535 SELF CARE MNGMENT TRAINING: CPT | Mod: GO

## 2019-03-16 PROCEDURE — 85610 PROTHROMBIN TIME: CPT | Performed by: STUDENT IN AN ORGANIZED HEALTH CARE EDUCATION/TRAINING PROGRAM

## 2019-03-16 PROCEDURE — 25000132 ZZH RX MED GY IP 250 OP 250 PS 637: Performed by: INTERNAL MEDICINE

## 2019-03-16 PROCEDURE — 85027 COMPLETE CBC AUTOMATED: CPT | Performed by: STUDENT IN AN ORGANIZED HEALTH CARE EDUCATION/TRAINING PROGRAM

## 2019-03-16 PROCEDURE — 12000001 ZZH R&B MED SURG/OB UMMC

## 2019-03-16 PROCEDURE — 97140 MANUAL THERAPY 1/> REGIONS: CPT | Mod: GO | Performed by: OCCUPATIONAL THERAPIST

## 2019-03-16 PROCEDURE — 80048 BASIC METABOLIC PNL TOTAL CA: CPT | Performed by: STUDENT IN AN ORGANIZED HEALTH CARE EDUCATION/TRAINING PROGRAM

## 2019-03-16 PROCEDURE — 83735 ASSAY OF MAGNESIUM: CPT | Performed by: STUDENT IN AN ORGANIZED HEALTH CARE EDUCATION/TRAINING PROGRAM

## 2019-03-16 PROCEDURE — 00000146 ZZHCL STATISTIC GLUCOSE BY METER IP

## 2019-03-16 RX ORDER — POTASSIUM CHLORIDE 750 MG/1
20 TABLET, EXTENDED RELEASE ORAL EVERY EVENING
Status: DISCONTINUED | OUTPATIENT
Start: 2019-03-16 | End: 2019-03-17

## 2019-03-16 RX ORDER — NYSTATIN 100000 U/G
CREAM TOPICAL 2 TIMES DAILY PRN
Refills: 0 | DISCHARGE
Start: 2019-03-16 | End: 2019-04-17

## 2019-03-16 RX ORDER — TAMSULOSIN HYDROCHLORIDE 0.4 MG/1
0.4 CAPSULE ORAL DAILY
Refills: 0 | Status: ON HOLD | DISCHARGE
Start: 2019-03-16 | End: 2019-04-03

## 2019-03-16 RX ORDER — GARLIC 200 MG
1000 TABLET ORAL DAILY
Refills: 0 | Status: ON HOLD | DISCHARGE
Start: 2019-03-16 | End: 2019-04-02

## 2019-03-16 RX ORDER — TORSEMIDE 5 MG/1
75 TABLET ORAL
Refills: 0 | Status: ON HOLD | DISCHARGE
Start: 2019-03-16 | End: 2019-04-03

## 2019-03-16 RX ORDER — POTASSIUM CHLORIDE 1500 MG/1
20 TABLET, EXTENDED RELEASE ORAL EVERY EVENING
Refills: 0 | DISCHARGE
Start: 2019-03-16 | End: 2019-03-18

## 2019-03-16 RX ORDER — AMOXICILLIN 250 MG
1-2 CAPSULE ORAL 2 TIMES DAILY PRN
Qty: 60 TABLET | Refills: 0 | DISCHARGE
Start: 2019-03-16 | End: 2019-04-17

## 2019-03-16 RX ORDER — METOPROLOL TARTRATE 1 MG/ML
5 INJECTION, SOLUTION INTRAVENOUS EVERY 5 MIN PRN
Status: DISCONTINUED | OUTPATIENT
Start: 2019-03-16 | End: 2019-03-22 | Stop reason: HOSPADM

## 2019-03-16 RX ORDER — POTASSIUM CHLORIDE 1500 MG/1
40 TABLET, EXTENDED RELEASE ORAL DAILY
Refills: 0 | DISCHARGE
Start: 2019-03-16 | End: 2019-03-18

## 2019-03-16 RX ORDER — AMMONIUM LACTATE 12 G/100G
CREAM TOPICAL 2 TIMES DAILY PRN
Qty: 385 G | Refills: 0 | DISCHARGE
Start: 2019-03-16 | End: 2019-04-17

## 2019-03-16 RX ORDER — OMEGA-3 FATTY ACIDS/FISH OIL 300-1000MG
1 CAPSULE ORAL DAILY
Qty: 30 CAPSULE | Refills: 0 | DISCHARGE
Start: 2019-03-16 | End: 2019-04-17

## 2019-03-16 RX ORDER — MULTIVIT WITH MINERALS/LUTEIN
1000 TABLET ORAL DAILY
Refills: 0 | DISCHARGE
Start: 2019-03-16 | End: 2019-04-17

## 2019-03-16 RX ORDER — MULTIPLE VITAMINS W/ MINERALS TAB 9MG-400MCG
1 TAB ORAL DAILY
Qty: 30 EACH | Refills: 0 | DISCHARGE
Start: 2019-03-16 | End: 2019-04-17

## 2019-03-16 RX ORDER — ATORVASTATIN CALCIUM 40 MG/1
40 TABLET, FILM COATED ORAL DAILY
Qty: 30 TABLET | Refills: 0 | DISCHARGE
Start: 2019-03-16 | End: 2019-04-17

## 2019-03-16 RX ORDER — POLYETHYLENE GLYCOL 3350 17 G/17G
17 POWDER, FOR SOLUTION ORAL DAILY PRN
Qty: 15 PACKET | Refills: 0 | DISCHARGE
Start: 2019-03-16 | End: 2019-04-17

## 2019-03-16 RX ORDER — WARFARIN SODIUM 5 MG/1
12.5 TABLET ORAL DAILY
Qty: 75 TABLET | Refills: 11 | DISCHARGE
Start: 2019-03-16 | End: 2019-03-18

## 2019-03-16 RX ORDER — LEVOTHYROXINE SODIUM 175 UG/1
175 TABLET ORAL
Refills: 0 | DISCHARGE
Start: 2019-03-16 | End: 2019-04-10

## 2019-03-16 RX ORDER — METOPROLOL SUCCINATE 100 MG/1
100 TABLET, EXTENDED RELEASE ORAL DAILY
Refills: 0 | Status: ON HOLD | DISCHARGE
Start: 2019-03-16 | End: 2019-04-03

## 2019-03-16 RX ADMIN — Medication 75 MG: at 08:40

## 2019-03-16 RX ADMIN — TAMSULOSIN HYDROCHLORIDE 0.4 MG: 0.4 CAPSULE ORAL at 08:43

## 2019-03-16 RX ADMIN — CHLORHEXIDINE GLUCONATE 15 ML: 1.2 RINSE ORAL at 08:47

## 2019-03-16 RX ADMIN — LEVOTHYROXINE SODIUM 175 MCG: 25 TABLET ORAL at 08:42

## 2019-03-16 RX ADMIN — METOPROLOL SUCCINATE 100 MG: 50 TABLET, EXTENDED RELEASE ORAL at 09:21

## 2019-03-16 RX ADMIN — POTASSIUM CHLORIDE 40 MEQ: 750 TABLET, EXTENDED RELEASE ORAL at 08:41

## 2019-03-16 RX ADMIN — Medication 75 MG: at 16:03

## 2019-03-16 RX ADMIN — POTASSIUM CHLORIDE 20 MEQ: 750 TABLET, EXTENDED RELEASE ORAL at 19:54

## 2019-03-16 RX ADMIN — ASPIRIN 81 MG: 81 TABLET, COATED ORAL at 08:44

## 2019-03-16 RX ADMIN — WARFARIN SODIUM 17.5 MG: 2.5 TABLET ORAL at 17:59

## 2019-03-16 RX ADMIN — CHLORHEXIDINE GLUCONATE 15 ML: 1.2 RINSE ORAL at 19:55

## 2019-03-16 RX ADMIN — ATORVASTATIN CALCIUM 40 MG: 40 TABLET, FILM COATED ORAL at 19:59

## 2019-03-16 ASSESSMENT — ACTIVITIES OF DAILY LIVING (ADL)
ADLS_ACUITY_SCORE: 18
ADLS_ACUITY_SCORE: 18
ADLS_ACUITY_SCORE: 19
ADLS_ACUITY_SCORE: 18

## 2019-03-16 NOTE — PLAN OF CARE
Time: 2424-6680    Vitals: VSS on 2L via NC, BP soft  Activity: Assist x2/ walker in ahuja  Pain: denies pain  Neuro: A&O x4, some numbness/tingling in BLE at baseline  Cardiac:  Afib, controlled   Respiratory:  LS clear BLE with diminished bases, on 2L O2 at baseline.    GI/:  Voiding spontaneously, last BM 3/15  Diet:  CHO diet, BG checks, tolerating well  Lines: R PIV, SL  Skin:  Wounds on Bilateral toes, covered with foam dressings  Labs: BG WNL  Plan: Discharge to [facility] when bed is available, will continue to monitor and follow POC

## 2019-03-16 NOTE — PLAN OF CARE
"Discharge Planner OT   Patient plan for discharge: rehab  Current status:  pt supine in bed, motivated to participate in OT session. Resting -160. Supine>Sit EOB with Min A at LEs. HR elevated to 210 briefly, recovered to 180 after 10 secs, recovered to 160 after extended time seated EOB. Pt completes sit<>Stand with Min A from elevated bed. Stands at FWW, and attempting to walk but therapist educating pt about elevated HR up to 190-210 with stand. Pt states he feels fine and would like to continue. Therapist not comfortable with continuing session with elevated HR. Pt requesting to use toilet, declines commode. RN present and reluctantly agreeable as pt refusing commode and urgently needing to use toilet. Pt states \"I promise I won't drop dead, I'll will myself to live just to use the toilet instead (of commode)\". Pt ambulates to bathroom with RN supervision. Sits to toilet with Vasyl. RN present and takes over session. Unsafe to continue with OT session due to elevated HR.   Barriers to return to prior living situation: medical status, endurance, level of assist  Recommendations for discharge: TCU  Rationale for recommendations: to progress ind with ADLS/IADLS, limited by HR       Entered by: Nikki Powers 03/16/2019 2:42 PM       "

## 2019-03-16 NOTE — PROGRESS NOTES
[facility], [place]  Progress Note - Maroon 1 Service   Date of Admission:  3/6/2019    Assessment & Plan   Clarke Moreira is a 68 year old male with PMH of afib on warfarin, HFpEF, T2DM, and recent dental work (2/26/19) who is admitted on 3/6/2019 with severe sepsis and MICHELLE. Likely 2/2 sinusitis and possible contribution of excessive diuresis/poor PO intake.  Infection resolved.  Remains inpatient due to arrhythmia and intermittent hypotension.       Today:  Increased potassium replacement.   Stopped telemetry   Ready for discharge    Afib RVR: Resolved  Afib s/p ablation 2008 on warfarin: Now with excellent rate control and improved hemodynamic stability.     - warfarin dosing per pharmacy  - metoprolol succinate to 100mg   - PT/OT consults, strength and ADLs, and lymphedema    Orthostatic hypotension: Improving  He appears euvolemic. Also suspect venous insufficiency as it improves with leg wraps.  - Reduced home BP meds  - Continue with leg wraps    Severe sepsis: Resolved  Recent dental procedures with tooth extraction 2/26/19:  Left Maxillary sinusitis: Sepsis resolved.  Suspect oral/sinusitus as source given timing and CT findings.  Completed course of augmentin.  - ECHO without vegetations  - blood cultures, NGTD  - MRSA nares negative  - OMFS consulted, appreciate recs   Stopped vanc/zosyn 3/6-3/9   PO Augmentin 3/9-12, complete    Sinus precautions for 6 weeks     MICHELLE: Baseline creatinine 1.0.  Increased to 4.15 on admission, downtrending after fluids. Retroperitoneal US without hydro or obvious parenchymal changes. Now back to near baseline.  Up titrating home meds  - PRN bladder scan/cath  - PTA tamsulosin     Left temple lesion:   Hx BCC: Site of prior skin biopsy/exisino.  Per patient has become chronic, never fully heals and bleeds easily.  Path from 2014 consistent with BCC.  Concerning for incomplete resection/marjolin's ulcer?    - Outpatient follow up with PCP or  "dermatology    Chronic Issues:  Hx HFpEF:  Follows in [facility] clinic  Hx HTN:   - Torsemide 75mg BID  - Holding spironolactone and lisinopril  - Potassium 40 AM 20 PM CR  Diabetic foot ulcers:  Hx PAD:  S/p bedside debridement by podiatry.    - Daily dressing changes, keep clean/dry  - WBAT w/ protective footwear for transfers and PT  - Follow up in podiatry clinic 1-2 weeks after discharge   WBAT  - Offloading boots  Prior CVA:  CT head, MRI/MRA brain with no acute infarct  THONG:  CPAP used at home, patient declines CPAP here in hospital, does not like our masks  DMII:  Last A1c 6.1 on 2/26/19.  Will plan to resume PTA metformin at discharge  Hypothyroidism: PTA levothyroxine  Depression/anxiety: NTD     Diet: Consistent Carbohydrate Diet 0839-9486 Calories: Moderate Consistent CHO (4-6 CHO units/meal)    Fluids: IVF boluses PRN  Lines: PIV  DVT Prophylaxis: PTA Warfarin, pharmacy to dose  Bazzi Catheter: not present  Code Status: DNR/DNI  patient would like to discuss pressors if he needs this, if he is unable to discuss pressors because he is so altered/hypotensive, then he would NOT want pressors and the medical team should \"just let me go\"    Disposition Plan   Expected discharge: 1-2 days, recommended to TCU once tolerating home meds and BPs/HRs stable  Entered: Jesús Olivas MD 03/16/2019, 11:27 AM     Jesús Olivas MD  Internal Medicine PGY1   ______________________________________________________________________    Interval History    No overnight events  Heart rate improved.  No symptomatic hypotension  Otherwise 4-point ROS negative.      Data reviewed today: I reviewed all medications, new labs and imaging results over the last 24 hours.    Physical Exam   Vital Signs: Temp: 97  F (36.1  C) Temp src: Oral BP: 111/65 Pulse: 87 Heart Rate: 88 Resp: 18 SpO2: 98 % O2 Device: Nasal cannula Oxygen Delivery: 2 LPM  Weight: 378 lbs 12 oz    GEN: Adult male, resting supine in bed. NAD  HEENT: No " discharge visible at the nares, left paranasal tenderness has resolved  CV: Normal rate, irregularly irregular, NMRG. Warm, well-perfused extremities, unable to appreciate JVD  RESP: CTAB from the anterior, possibly slightly decreased at the bases, normal WOB  MSK: Bilateral legs wrapped, edema improved from prior with wraps  Skin: Warm, dry. Left temple lesion with depressed center and raised borders

## 2019-03-16 NOTE — PLAN OF CARE
9000-6749: Episode of elevated HR. Tele now A fib with HR <110. Cardiology EP consult placed. Plan for [facility] when medically stable.

## 2019-03-16 NOTE — PLAN OF CARE
Edema 5A: Recommend continued use of compression warps to further manage chronic LE edema. Reapplication of GCB from MTPs to knee creases. Remove wraps if causing pain/numbness or become soiled.

## 2019-03-16 NOTE — DISCHARGE SUMMARY
[facility] [place]  Discharge Summary - Medicine & Pediatrics       Date of Admission:  3/6/2019  Date of Discharge:  3/22/2019  Discharging Provider: Dr. Desouza  Discharge Service: Maria 1    Discharge Diagnoses   Septic shock  Left maxillary sinusitis after dental extraction  MICHELLE  Afib c/c RVR with exertion  Supra-theraputic INR  Orthostatic hypotension  Hx HFpEF  Hx HTN    Follow-ups Needed After Discharge   Follow up with PCP/TCU physician within 3-4 days of discharge with BMP with Mg.    Follow up with [facility] Dr. Willoughby within 1 month of discharge to review medication changes and zio patch data  Follow up with Podiatry within 1 week of discharge    Discharge Disposition   Discharged to rehabilitation facility  Condition at discharge: Stable    Hospital Course   Clarke Moreira was admitted on 3/6/2019 for sepsis and MICHELLE.  The following problems were addressed during his hospitalization:    Septic shock: Resolved  Recent dental procedures with tooth extraction 2/26/19:  Left Maxillary sinusitis: Sepsis resolved.  Suspect oral/sinusitus as source given timing and CT findings.  - - After initial broad spectrum IV ABX, completed course of augmentin    Afib s/p ablation 2008 on warfarin: Hospitalization complicated by Afib with RVR with brief periods in the 160s-180s improving with conditioning.  Attempted to increase BB but patient did not tolerate and became hypotensive, so dose was reduced.  Suspect primary due to deconditioning and stress of hospitalization.  Not volume overloaded. Supratherputic INR: >4 on admission.  Resolved prior to discharge.  Warfarin continued with plan for close monitoring.    - EP cardiology consulted for consideration of rhythm control or ablation, but not felt indicated at this time  - Continue warfarin      10mg day of discharge then INR day after and dosing per TCU  - metoprolol succinate 100mg daily   - Continue PT/OT       Orthostatic  hypotension: Likely due to degree of over diuresis/DUTCH medication.  Resolved with reduction in home BP meds and diuretics.  Ideally with restart lisinopril at some point.     - Reduced home BP meds    Restart lisinopril per PCP or at [facility] follow up     Consider restarting spironolactone  - Continue with leg wraps  - Orthostatics BID     MICHELLE: Baseline creatinine 1.0.  Increased to 4.15 on admission,.  Resolved with fluids and brief diuretic holiday.       Left temple lesion:   Hx BCC: Site of prior skin biopsy/exision.  Per patient has become chronic, never fully heals and bleeds easily.  Path from 2014 consistent with BCC.  Concerning for incomplete resection/marjolin's ulcer?    - Outpatient follow up with PCP or referral to dermatology     Chronic Issues:  Hx HFpEF:  Follows in [facility]  Hx HTN:   - Reduced torsemide to 75mg BID  - Holding Spironolactone and lisinopril  - Potassium CR 40 BID  Diabetic foot ulcers:  Hx PAD:  S/p bedside debridement by podiatry.    - Daily dressing changes, keep clean/dry  - WBAT w/ protective footwear for transfers and PT  - Follow up in podiatry clinic 1-2 weeks after discharge              Wieght bearing as tolerate              Offloading boots  - Wound care instructions, perform daily: 1. Spray with microklenz, pat dry 2. Apply small drop of MediHoney to square of optifoam, apply to wound 3. Secure with loosely applied coban.   - Orthotics referral for diabetic shoe and insert fitting. Also needs open toed surgical shoe or DH shoe so that all pressure is removed from distal hallux.     Prior CVA:  CT head, MRI/MRA brain without acute infarct  THONG:  CPAP used at home, patient declines CPAP here in hospital, does not like our masks, resume  DMII:  Last A1c 6.1 on 2/26/19.  Resume PTA metformin at discharge  Hypothyroidism: PTA levothyroxine  Depression/anxiety: NTD    Consultations This Hospital Stay   PHARMACY TO DOSE VANCO  MEDICATION HISTORY IP PHARMACY  CONSULT  PHYSICAL THERAPY ADULT IP CONSULT  OCCUPATIONAL THERAPY ADULT IP CONSULT  PODIATRY IP CONSULT  PHARMACY TO DOSE VANCO  DENTISTRY ADULT IP CONSULT  ORAL MAXILLOFACIAL SURGERY ADULT IP CONSULT  ORTHOSIS EXTREMITY LOWER REFERRAL IP CONSULT  ORTHOSIS EXTREMITY LOWER REFERRAL IP CONSULT  LYMPHEDEMA THERAPY IP CONSULT  PHARMACY TO DOSE WARFARIN  LYMPHEDEMA THERAPY IP CONSULT  CARDIOLOGY ELECTROPHYSIOLOGY (EP) IP CONSULT  PHYSICAL THERAPY ADULT IP CONSULT  OCCUPATIONAL THERAPY ADULT IP CONSULT    Code Status   DNR/DNI       The patient was discussed with MD Maria Samson  Service  [facility]  Pager: [phone]  ______________________________________________________________________    Physical Exam   Vital Signs: Temp: 98.1  F (36.7  C) Temp src: Oral BP: 107/69 Pulse: 88   Resp: 18 SpO2: 98 % O2 Device: None (Room air) Oxygen Delivery: 2 LPM  Weight: 356 lbs 11.27 oz    GEN: AAOX3, NAD  HEENT: PERRLA, EOMI, anicteric sclera, difficult to assess JVD due to beard  CV: irregular rhythm, normal rate, no murmur, no rubs, no gallops,   RESP:CTAB  EXT: Bilateral legs wrapped, edema significantly improved   Skin: warm, dry, no petechiae or rashes      Primary Care Physician   Matthias Sanchez    Immunizations       Discharge Orders       Significant Results and Procedures   Results for orders placed or performed during the hospital encounter of 03/06/19   XR Chest Port 1 View    Narrative    EXAMINATION:  XR CHEST PORT 1 VW 3/6/2019 12:37 PM.    COMPARISON: Chest x-ray 1/5/2018. CT chest 9/23/2017.    HISTORY:  sob    FINDINGS: Single semiupright AP radiograph of the chest. The trachea  is midline. The cardiomediastinal silhouette is within normal limits.  Diffuse bilateral interstitial opacities, decreased when compared to  1/5/2018. Trace left pleural effusion. No pneumothorax. Bibasilar  airspace opacities. No focal pulmonary opacity. Visualized  upper  abdomen and bones are unremarkable.      Impression    IMPRESSION:   1. No acute cardiopulmonary abnormality.  2. Pulmonary venous congestion, improved when compared to 1/5/2018.  3. Trace left pleural effusion and mild bibasilar atelectasis.    I have personally reviewed the examination and initial interpretation  and I agree with the findings.    SOL CRAIG MD   POC US ECHO LIMITED    Impression    Limited Bedside Cardiac Ultrasound, performed and interpreted by me.   Indication: Hypotension/shock.  Parasternal long axis, parasternal short axis and subcostal views were acquired.   Image quality was satisfactory.    Findings:    Global left ventricular function appears intact.  Chambers do not appear dilated.  There is no evidence of free fluid within the pericardium.    IMPRESSION: Grossly normal left ventricular function and chamber size.  No pericardial effusion.   XR Toe Right 2 Views    Narrative    Exam: XR TOE RIGHT G/E 2 VIEWS, 3/6/2019 12:39 PM    Indication: osteo    Comparison: None    Findings:   AP and lateral views of the right first toe.    Irregularity of the tuft of the distal first phalanx. Soft tissue  lesion noted over the tip of the soft tissues of the first digit. No  fracture.      Impression    Impression:   Irregularity of the tuft of the distal first phalanx indicating  osteomyelitis.    The preliminary report of no osteomyelitis was provided by Dr. Mosher.  After review by the staff radiologist, the final interpretation of  osteomyelitis was provided by Dr. Mosher and communicated to Dr. Faith  at 1:35 PM on 3/6/2019.    I have personally reviewed the examination and initial interpretation  and I agree with the findings.    JUTTA ELLERMANN, MD   XR Toe Left 2 Views    Narrative    Exam: XR TOE LT G/E 2 VW, 3/6/2019 12:38 PM    Indication: osteo    Comparison: None    Findings:   AP, oblique and lateral views of the left first toe.    Soft tissue lesion noted over the distal  first toe. 4 x 2 mm loose  bony body adjacent to the tibial side of the distal first phalanx,  with apparent donor site seen immediately adjacent. Irregularity noted  about the cortex of the tuft of the distal phalanx.      Impression    Impression:   1. Irregularity of the distal tuft of the distal phalanx indicating  osteomyelitis.  2. Loose bony body adjacent to the distal first phalanx, presumably  fractured from the immediately adjacent donor site, acute or subacute.    The preliminary report of no osteomyelitis was provided by Dr. Mosher.  After review by the staff radiologist, the final interpretation of  osteomyelitis was provided by Dr. Mosher and communicated to Dr. Faith  at 1:35 PM on 3/6/2018.    I have personally reviewed the examination and initial interpretation  and I agree with the findings.    JUTTA ELLERMANN, MD   CT Head w/o Contrast     Value    Radiologist flags (Urgent)     Likely subacute or chronic infarcts in the posterior    Narrative    CT HEAD W/O CONTRAST 3/6/2019 12:51 PM    History: Ataxia, stroke suspected  ICD-10:  Comparison: None.    Technique: Using multidetector thin collimation helical acquisition  technique, axial, coronal and sagittal CT images from the skull base  to the vertex were obtained without intravenous contrast.     Findings:    On the noncontrast images of the brain, there is bilateral  hypoattenuation in the occipital lobes that does not likely represent  acute infarct may represent  subacute or chronic lesions without overt  cerebral atrophy. No overt hyperdense vasculature identified, and no  underlying intracranial hemorrhage. No mass effect or midline shift  associated with these lesions either. The ventricles are not enlarged.  The gray-white matter differentiation of the frontal lobes is  preserved and in the insula. The basal cisterns are patent.  Mostly obstructed left maxillary sinus with air-fluid level and debris  that is mildly hyperdense and may be  complex. The mastoid air cells  are clear.       Impression    Impression:   1. Favor subacute age or chronic age bilateral posterior cerebral  artery distribution infarcts without associated mass effect or  underlying intracranial hemorrhage. MRI is more sensitive to age these  infarcts or to evaluate for acute ones.  2. Left maxillary sinusitis may be acute and may be complex or  obstructed.    [Access Center: Likely subacute or chronic infarcts in the posterior  cerebral artery distributions but MRI is more sensitive to evaluate.]    This report will be copied to the [facility] to ensure a  provider acknowledges the finding. Access Center is available Monday  through Friday 8am-3:30 pm.     LANCE ÁLVAREZ MD   CT Soft Tissue Neck w/o Contrast    Narrative    CT SOFT TISSUE NECK W/O CONTRAST 3/6/2019 12:52 PM    History:  sepsis s/p recent wisdom teeth extraction with neck pain and  stiffness, eval abscess      Comparison:  Same day head CT, CT chest 9/23/2017, 2/28/2008    Technique: Thin section helical CT images were obtained from the skull  base down to the level of the aortic arch without intravenous  contrast.  Axial, coronal and sagittal reformations were performed  with 2-3 mm slice thickness reconstruction. Images were reviewed in  soft tissue, lung and bone windows.    Findings:     Examination is limited by lack of intravenous contrast.    Postsurgical changes of left superior wisdom tooth extraction. There  is adjacent soft tissue thickening along the alveolar ridge, without  focal fluid collection. There is a bony defect along the floor of the  maxillary sinus at the site of wisdom tooth extraction, measuring 7 x  6 mm (transverse by AP)). Complex fluid is seen within the left  maxillary sinus, with areas of hyperdensity. There is an air-fluid  level within the superior aspect of the left maxillary sinus. The left  ostiomeatal unit appears occluded. The remainder of the  visualized  paranasal sinuses are relatively clear.    The mucosal spaces are grossly unremarkable on this noncontrast  examination. The major salivary glands are unremarkable in appearance.  The left lobe of the thyroid is diminutive in appearance, with foci of  internal calcifications, similar in appearance to chest CT dated  2/28/2008.    No bulky cervical lymphadenopathy. Moderate atherosclerotic  calcifications about the left carotid bifurcation. Noncontrast  evaluation of the vessels of the neck is otherwise unremarkable.      Impression    Impression:  1. Findings concerning for cellulitis overlying the site left  maxillary wisdom to extraction. No focal fluid collection/abscess.  2. Findings compatible with odontogenic sinusitis, with complex and/or  hemorrhagic debris within the left maxillary sinus.    I have personally reviewed the examination and initial interpretation  and I agree with the findings.    LANCE ÁLVAREZ MD   MRA Brain ([place]) wo Contrast    Narrative    MRI brain without contrast  MRA COW    Provided History:  Stroke, follow up.    Comparison:  CT head without contrast 3/6/2019      Technique:   Axial and sagittal diffusion-weighted (with ADC map), axial FLAIR, and  axial susceptibility-weighted images of the brain were obtained  without the administration of intravenous contrast.    MRI: Sagittal T1-weighted, axial T2-weighted with fat saturation,  turboFLAIR and diffusion-weighted with ADC map and coronal T1-weighted  and T2-weighted images of the brain were obtained without intravenous  contrast.      MRA Head:  Using a 3D time-of-flight image acquisition technique, MRA  of the major arteries at the base of the brain was obtained without  intravenous contrast. Three-dimensional reconstructions of the head  MRA were created, which were reviewed by the radiologist.    Findings:   Focal areas of encephalomalacia in the bilateral occipital lobes, left  greater than right,  without corresponding restricted diffusion to  suggest acute infarct. No intracranial mass lesion, mass effect,  midline shift. The ventricles and sulci are normal for age. No  abnormal findings on susceptibility weighted images. Scattered  periventricular and subcortical white matter T2 FLAIR  hyperintensities.    MRA HEAD images demonstrate a diminutive P4 segment of the right PCA.  The posterior communicating arteries are not well utilized. The  anterior communicating artery is patent.    Opacification of the left maxillary sinus.      Impression    Impression:   1. No evidence of acute infarct. Focal areas of encephalomalacia in  the bilateral occipital lobes likely from prior infarct.  2. Diminutive P4 segment of the right PCA. No corresponding restricted  diffusion to suggest acute occlusion.  3. Leukoaraiosis  4. Left maxillary sinusitis    I have personally reviewed the examination and initial interpretation  and I agree with the findings.    RENETTA WYNNE MD   MR Brain for Stroke Limited    Narrative    MRI brain without contrast  MRA COW    Provided History:  Stroke, follow up.    Comparison:  CT head without contrast 3/6/2019      Technique:   Axial and sagittal diffusion-weighted (with ADC map), axial FLAIR, and  axial susceptibility-weighted images of the brain were obtained  without the administration of intravenous contrast.    MRI: Sagittal T1-weighted, axial T2-weighted with fat saturation,  turboFLAIR and diffusion-weighted with ADC map and coronal T1-weighted  and T2-weighted images of the brain were obtained without intravenous  contrast.      MRA Head:  Using a 3D time-of-flight image acquisition technique, MRA  of the major arteries at the base of the brain was obtained without  intravenous contrast. Three-dimensional reconstructions of the head  MRA were created, which were reviewed by the radiologist.    Findings:   Focal areas of encephalomalacia in the bilateral occipital lobes,  left  greater than right, without corresponding restricted diffusion to  suggest acute infarct. No intracranial mass lesion, mass effect,  midline shift. The ventricles and sulci are normal for age. No  abnormal findings on susceptibility weighted images. Scattered  periventricular and subcortical white matter T2 FLAIR  hyperintensities.    MRA HEAD images demonstrate a diminutive P4 segment of the right PCA.  The posterior communicating arteries are not well utilized. The  anterior communicating artery is patent.    Opacification of the left maxillary sinus.      Impression    Impression:   1. No evidence of acute infarct. Focal areas of encephalomalacia in  the bilateral occipital lobes likely from prior infarct.  2. Diminutive P4 segment of the right PCA. No corresponding restricted  diffusion to suggest acute occlusion.  3. Leukoaraiosis  4. Left maxillary sinusitis    I have personally reviewed the examination and initial interpretation  and I agree with the findings.    RENETTA WYNNE MD   XR Chest Port 1 View    Narrative    Exam: XR CHEST PORT 1 VW, 3/6/2019 7:58 PM    Indication: post attempted subclavian line placement    Comparison: Chest radiograph dated 3/6/2019 at 1159    Findings:   AP view of the chest. No new focal airspace consolidation, pleural  effusion or pneumothorax. Prominent interstitial opacities similar to  prior exam. Upper abdomen is unremarkable.      Impression    Impression:   No new focal airspace consolidation, pleural effusion or pneumothorax.    I have personally reviewed the examination and initial interpretation  and I agree with the findings.    OSIEL LAUREANO MD   US Retroperitoneal Complete Portable    Narrative    EXAMINATION: US RETROPERITONEAL COMPLETE PORTABLE, 3/7/2019 10:49 AM     COMPARISON: None.    HISTORY: New acute kidney injury, history of BPH, looking for  hydronephrosis.    FINDINGS:    Right kidney: Measures 12.9 cm in length. Parenchyma is of  normal  thickness and echogenicity. No focal mass. No hydronephrosis.    Left kidney: Measures 11.5 cm in length. Parenchyma is of normal  thickness and echogenicity. No focal mass. No hydronephrosis. Mild  lobulation along the interpolar region of the left kidney likely  representing dromedary hump due to splenic impression.    Bladder: Partially distended.      Impression    IMPRESSION: No hydronephrosis on either kidney.    BETTIE LEDESMA MD       Discharge Medications   Current Discharge Medication List      START taking these medications    Details   potassium chloride ER (K-DUR/KLOR-CON M) 20 MEQ CR tablet Take 2 tablets (40 mEq) by mouth 2 times daily  Refills: 0    Associated Diagnoses: Chronic systolic congestive heart failure (H)      WARFARIN NO DOSE TODAY 1 each once for 1 dose    Associated Diagnoses: Persistent atrial fibrillation (H)         CONTINUE these medications which have CHANGED    Details   ammonium lactate (LAC-HYDRIN) 12 % external cream Apply topically 2 times daily as needed for dry skin  Qty: 385 g, Refills: 0    Associated Diagnoses: Dry skin; Lichen of skin      Ascorbic Acid (VITAMIN C) POWD Take 1,000 mcg by mouth daily  Refills: 0    Associated Diagnoses: Adequate nutrition      aspirin (ASA) 81 MG tablet Take 1 tablet (81 mg) by mouth daily  Qty: 30 tablet, Refills: 0    Associated Diagnoses: Essential hypertension      atorvastatin (LIPITOR) 40 MG tablet Take 1 tablet (40 mg) by mouth daily  Qty: 30 tablet, Refills: 0    Associated Diagnoses: Type 2 diabetes mellitus without complication, without long-term current use of insulin (H)      levothyroxine (SYNTHROID/LEVOTHROID) 175 MCG tablet Take 1 tablet (175 mcg) by mouth every morning (before breakfast)  Refills: 0    Associated Diagnoses: Hypothyroidism, unspecified type      metFORMIN (GLUCOPHAGE) 500 MG tablet Take 1 tablet (500 mg) by mouth 2 times daily (with meals)  Qty: 60 tablet, Refills: 11    Associated Diagnoses:  Type 2 diabetes mellitus without complication, without long-term current use of insulin (H)      metoprolol succinate ER (TOPROL-XL) 100 MG 24 hr tablet Take 1 tablet (100 mg) by mouth daily  Refills: 0    Associated Diagnoses: Paroxysmal atrial fibrillation (H)      multivitamin w/minerals (MULTI-VITAMIN) tablet Take 1 tablet by mouth daily  Qty: 30 each, Refills: 0    Associated Diagnoses: Type 2 diabetes mellitus without complication, without long-term current use of insulin (H)      nystatin (MYCOSTATIN) 464406 UNIT/GM external cream Apply topically 2 times daily as needed for dry skin  Refills: 0    Associated Diagnoses: Dry skin      omega 3 1000 MG CAPS Take 1 g by mouth daily  Qty: 30 capsule, Refills: 0    Associated Diagnoses: Hyperlipidemia LDL goal <100      polyethylene glycol (MIRALAX/GLYCOLAX) packet Take 17 g by mouth daily as needed for constipation  Qty: 15 packet, Refills: 0    Associated Diagnoses: Constipation, unspecified constipation type      senna-docusate (SENOKOT-S/PERICOLACE) 8.6-50 MG tablet Take 1-2 tablets by mouth 2 times daily as needed for constipation  Qty: 60 tablet, Refills: 0    Associated Diagnoses: Constipation, unspecified constipation type      tamsulosin (FLOMAX) 0.4 MG capsule Take 1 capsule (0.4 mg) by mouth daily  Refills: 0    Associated Diagnoses: Urinary retention      torsemide (DEMADEX) 5 MG tablet Take 15 tablets (75 mg) by mouth 2 times daily  Refills: 0    Associated Diagnoses: Chronic systolic congestive heart failure (H)      vitamin B complex with vitamin C (VITAMIN  B COMPLEX) tablet Take 1 tablet by mouth daily  Refills: 0    Associated Diagnoses: Adequate nutrition      vitamin E (VITAMIN E COMPLEX) 1000 units (450 mg) capsule Take 1 capsule (1,000 Units) by mouth daily  Refills: 0    Associated Diagnoses: Adequate nutrition      !! warfarin (COUMADIN) 10 MG tablet Take 1 tablet (10 mg) by mouth once for 1 dose  Qty: 1 tablet, Refills: 0    Comments: Check  INR tomorrow, then dosing  Associated Diagnoses: Persistent atrial fibrillation (H)      !! warfarin (COUMADIN) 5 MG tablet Take 2.5 tablets (12.5 mg) by mouth daily  Qty: 75 tablet, Refills: 11    Associated Diagnoses: Persistent atrial fibrillation (H)       !! - Potential duplicate medications found. Please discuss with provider.      CONTINUE these medications which have NOT CHANGED    Details   blood glucose monitoring (ONE TOUCH DELICA) lancets Use to test blood sugars 2 times daily or as directed.  Qty: 100 each, Refills: 11    Associated Diagnoses: Diabetes mellitus, type 2 (H)      blood glucose monitoring (ONE TOUCH ULTRA MINI) meter device kit Use to test blood sugars 2 times daily or as directed.  Qty: 1 kit, Refills: 0    Associated Diagnoses: Type 2 diabetes mellitus with hyperglycemia, without long-term current use of insulin (H)      blood glucose monitoring (ONETOUCH ULTRA) test strip Use to test blood sugars 2 times daily or as directed.  Qty: 100 strip, Refills: 11    Associated Diagnoses: Diabetes mellitus, type 2 (H)      !! order for DME Equipment being ordered: Bariatric Lift chair  Diagnosis - morbid obesity, CHF, Lymphedema    Fax to Ne from [facility] at [phone].  Qty: 1 Units, Refills: 0    Associated Diagnoses: Chronic systolic congestive heart failure (H); Lymphedema; Morbid obesity (H)      !! order for DME Equipment being ordered: Other: Velcro Compression stockings.   Treatment Diagnosis: bilateral lymphedema.  Qty: 2 each, Refills: 0    Associated Diagnoses: Lymphedema of extremity       !! - Potential duplicate medications found. Please discuss with provider.      STOP taking these medications       lisinopril (PRINIVIL/ZESTRIL) 5 MG tablet Comments:   Reason for Stopping:         potassium chloride (KLOR-CON) 20 MEQ packet Comments:   Reason for Stopping:         spironolactone (ALDACTONE) 25 MG tablet Comments:   Reason for Stopping:             Allergies   No Known  Allergies     Physician Attestation   I, Henrry Desouza, saw and evaluated this patient prior to discharge.  I discussed the patient with the resident/fellow and agree with plan of care as documented in the note.      I personally reviewed vital signs, medications, labs and imaging.    I personally spent 20 minutes on discharge activities.    Henrry Desouza MD  Date of Service (when I saw the patient): 03/21/19

## 2019-03-16 NOTE — PROVIDER NOTIFICATION
Heart rate was alarming above 160 on telemetry. Nursing went to check on patient and noted that patient was working with OT. Pt stated that he urgently needed to use the bathroom for a bowel movement. Commode was recommended but patient refused and wanted to go into bathroom. Patient assisted to toilet as he was already standing and MD immediately paged to notify of HR concerns. MD came to bedside and nursing assisted patient back to bed after finishing BM. -210 on tele. Pt mostly asymptomatic. Metoprolol PRN now available. MD ordered 12 lead EKG. HR  when back in bed. Continuing telemetry. Patient educated on no longer using toilet and pt reluctantly agreed to use the commode if he needs to have another BM tonight.

## 2019-03-17 ENCOUNTER — APPOINTMENT (OUTPATIENT)
Dept: OCCUPATIONAL THERAPY | Facility: CLINIC | Age: 69
DRG: 856 | End: 2019-03-17
Payer: COMMERCIAL

## 2019-03-17 LAB
ANION GAP SERPL CALCULATED.3IONS-SCNC: 9 MMOL/L (ref 3–14)
BUN SERPL-MCNC: 24 MG/DL (ref 7–30)
CALCIUM SERPL-MCNC: 9 MG/DL (ref 8.5–10.1)
CHLORIDE SERPL-SCNC: 101 MMOL/L (ref 94–109)
CO2 SERPL-SCNC: 30 MMOL/L (ref 20–32)
CREAT SERPL-MCNC: 1.05 MG/DL (ref 0.66–1.25)
GFR SERPL CREATININE-BSD FRML MDRD: 72 ML/MIN/{1.73_M2}
GLUCOSE BLDC GLUCOMTR-MCNC: 112 MG/DL (ref 70–99)
GLUCOSE BLDC GLUCOMTR-MCNC: 116 MG/DL (ref 70–99)
GLUCOSE BLDC GLUCOMTR-MCNC: 121 MG/DL (ref 70–99)
GLUCOSE BLDC GLUCOMTR-MCNC: 129 MG/DL (ref 70–99)
GLUCOSE BLDC GLUCOMTR-MCNC: 98 MG/DL (ref 70–99)
GLUCOSE SERPL-MCNC: 114 MG/DL (ref 70–99)
INR PPP: 2.67 (ref 0.86–1.14)
MAGNESIUM SERPL-MCNC: 2.1 MG/DL (ref 1.6–2.3)
POTASSIUM SERPL-SCNC: 3.4 MMOL/L (ref 3.4–5.3)
SODIUM SERPL-SCNC: 140 MMOL/L (ref 133–144)

## 2019-03-17 PROCEDURE — 93005 ELECTROCARDIOGRAM TRACING: CPT

## 2019-03-17 PROCEDURE — 80048 BASIC METABOLIC PNL TOTAL CA: CPT | Performed by: STUDENT IN AN ORGANIZED HEALTH CARE EDUCATION/TRAINING PROGRAM

## 2019-03-17 PROCEDURE — 00000146 ZZHCL STATISTIC GLUCOSE BY METER IP

## 2019-03-17 PROCEDURE — 25000132 ZZH RX MED GY IP 250 OP 250 PS 637

## 2019-03-17 PROCEDURE — 25000132 ZZH RX MED GY IP 250 OP 250 PS 637: Performed by: STUDENT IN AN ORGANIZED HEALTH CARE EDUCATION/TRAINING PROGRAM

## 2019-03-17 PROCEDURE — 99233 SBSQ HOSP IP/OBS HIGH 50: CPT | Mod: GC | Performed by: INTERNAL MEDICINE

## 2019-03-17 PROCEDURE — 25000132 ZZH RX MED GY IP 250 OP 250 PS 637: Performed by: INTERNAL MEDICINE

## 2019-03-17 PROCEDURE — 97535 SELF CARE MNGMENT TRAINING: CPT | Mod: GO

## 2019-03-17 PROCEDURE — 85610 PROTHROMBIN TIME: CPT | Performed by: STUDENT IN AN ORGANIZED HEALTH CARE EDUCATION/TRAINING PROGRAM

## 2019-03-17 PROCEDURE — 12000001 ZZH R&B MED SURG/OB UMMC

## 2019-03-17 PROCEDURE — 36415 COLL VENOUS BLD VENIPUNCTURE: CPT | Performed by: STUDENT IN AN ORGANIZED HEALTH CARE EDUCATION/TRAINING PROGRAM

## 2019-03-17 PROCEDURE — 99232 SBSQ HOSP IP/OBS MODERATE 35: CPT | Mod: GC | Performed by: INTERNAL MEDICINE

## 2019-03-17 PROCEDURE — 83735 ASSAY OF MAGNESIUM: CPT | Performed by: STUDENT IN AN ORGANIZED HEALTH CARE EDUCATION/TRAINING PROGRAM

## 2019-03-17 PROCEDURE — 93010 ELECTROCARDIOGRAM REPORT: CPT | Performed by: INTERNAL MEDICINE

## 2019-03-17 RX ORDER — POTASSIUM CHLORIDE 750 MG/1
40 TABLET, EXTENDED RELEASE ORAL EVERY EVENING
Status: DISCONTINUED | OUTPATIENT
Start: 2019-03-17 | End: 2019-03-22 | Stop reason: HOSPADM

## 2019-03-17 RX ADMIN — Medication 75 MG: at 08:20

## 2019-03-17 RX ADMIN — POTASSIUM CHLORIDE 40 MEQ: 750 TABLET, EXTENDED RELEASE ORAL at 20:16

## 2019-03-17 RX ADMIN — POTASSIUM CHLORIDE 40 MEQ: 750 TABLET, EXTENDED RELEASE ORAL at 08:19

## 2019-03-17 RX ADMIN — ATORVASTATIN CALCIUM 40 MG: 40 TABLET, FILM COATED ORAL at 20:16

## 2019-03-17 RX ADMIN — CHLORHEXIDINE GLUCONATE 15 ML: 1.2 RINSE ORAL at 08:17

## 2019-03-17 RX ADMIN — CHLORHEXIDINE GLUCONATE 15 ML: 1.2 RINSE ORAL at 20:17

## 2019-03-17 RX ADMIN — Medication 75 MG: at 15:55

## 2019-03-17 RX ADMIN — ASPIRIN 81 MG: 81 TABLET, COATED ORAL at 08:20

## 2019-03-17 RX ADMIN — TAMSULOSIN HYDROCHLORIDE 0.4 MG: 0.4 CAPSULE ORAL at 08:20

## 2019-03-17 RX ADMIN — METOPROLOL SUCCINATE 100 MG: 50 TABLET, EXTENDED RELEASE ORAL at 08:17

## 2019-03-17 RX ADMIN — WARFARIN SODIUM 12.5 MG: 2.5 TABLET ORAL at 18:14

## 2019-03-17 RX ADMIN — LEVOTHYROXINE SODIUM 175 MCG: 25 TABLET ORAL at 08:18

## 2019-03-17 ASSESSMENT — ACTIVITIES OF DAILY LIVING (ADL)
ADLS_ACUITY_SCORE: 19

## 2019-03-17 NOTE — PLAN OF CARE
Pt A&O. VSS on 2L via NC while sleeping. Telemetry in place, afib HR in 70's-80's (pt bedfast this shift). Denies pain/nausea. Lymph wraps in place. Still reluctant yet agreeable to use BSC instead of toilet if needs to have BM. Plan for cardiology EP.

## 2019-03-17 NOTE — PLAN OF CARE
Discharge Planner OT   Patient plan for discharge: not discussed  Current status: Pt reports MDs were just in to see him and request that he remain resting in bed with minimal activity due to HR concerns.  Pt does have questions and requests education on home management of B LE hygiene/washing/lotioning. Pt reports difficulty inspecting feet and reaching down to apply lotion prior to application of compression garments with adaptive device. Pt educated in compensatory techniques and devices to facilitate this: including long handled mirror vs angled mirror on floor, long handled sponge, bath back scrubber (or long strip of towel/cloth). Pt receptive to all education, will practice when HR stable and cleared for OOB activity again.   HR  throughout.   Barriers to return to prior living situation: medical status  Recommendations for discharge: TCU  Rationale for recommendations: to progress IND with ADLS and IADLS       Entered by: Nikki Powers 03/17/2019 11:37 AM

## 2019-03-17 NOTE — PLAN OF CARE
Time 6892-8982    Vitals: Stable on 2L O2 via NC. BPs intermittently on soft side. Tele A fib with HR mostly  while at rest. Allowed patient to sit at edge of bed this evening to clean up and HR went to 190 with exertion. Pt did not sustain at 190; HR mostly between 130-160 when washing up and then back to  when lying back in bed. Pt denied symptoms while sitting.   Pain: reported discomfort in back; repositioned   Neuro: A/O x 4. Asks appropriate questions about cares.     Cardiac: Cards EP consult today.    Respiratory: No acute issues on this shift.    GI/: Voiding in urinal. No BM.   Diet: Regular diet. Eating well.    Lines: PIV saline locked.   Skin/Wounds: Dressing on wound on R toe changed.   Endocrine: BGs before meals and HS - less than 140.   Labs/imaging: No magnesium replacement - level 2.1 today.         New changes this shift: Cards consult.      Plan: [facility] when plan in place for patient's HR control and when bed available.     Continue to monitor and follow POC

## 2019-03-17 NOTE — CONSULTS
CARDIOLOGY CONSULTATION    Name: Clarke Moreira MRN: [id number]     Age: 68 year old   YOB: 1950            HPI:   Reason for Consultation: AFib with RVR    History is obtained from the patient and per chart review.    68M, PMH of DM II, HTN, HPL, THONG, PAD, depression, prior HFrEF (now recovered LVEF), atrial fibrillation (s/p ablation in 2007, now in 100% AFib, on warfarin), who was initially admitted with sepsis, which has now resolved. Cardiology consulted for episodes of RVR with atrial fibrillation.     He follows with Dr. Willoughby as an out patient, takes 100 mg of Toprol XL, he had a ZioPatch in 12/2018, which showed 100% AF (average rate 97 bpm). Over the last few days he has had episodes of rapid ventricular response, that has been charted to be as high as 200's.     In terms of symptoms, he endorses no chest pain, pressure, heaviness, tightness, palpitations, presyncope or syncope. He does mention orthostatic lightheadedness with postural changes (while going to the commode). These episodes last a few seconds and subside spontaneously. Apart from a change in posture, he doesn't identify any other inciting factors.     His Tele recordings reveal HR mostly in the 80s - 90s, occasionally he would jump up to the 160s for a few seconds. I reviewed his tele recordings, and while some of those high rates are artifact, he does transiently go up to the 150s-160s. He is on 100 mg of Toprol XL and per primary hasnt tolerated higher doses of BB due to hypotension. Notably, over the last three days, labs have revealed Mg 2.0 to 2.1 and hypokalemia (3.4 - 3.7).             Past Medical History:     Past Medical History:   Diagnosis Date     Atrial fibrillation (H)      Basal cell carcinoma      Chronic anticoagulation      Diastolic heart failure (H)      Dyslipidemia      Essential hypertension      Morbid obesity (H)      Sleep-disordered breathing      Type 2 diabetes mellitus (H)               Past Surgical History:      Past Surgical History:   Procedure Laterality Date     BIOPSY OF SKIN LESION       MOHS MICROGRAPHIC PROCEDURE       PICC INSERTION Right 09/24/2017    5fr TL Bard PICC, 51cm (1cm external) in the R medial brachial vein w/ tip in the SVC.             Social History:     Social History     Tobacco Use     Smoking status: Never Smoker     Smokeless tobacco: Never Used   Substance Use Topics     Alcohol use: No     Comment: Former alcohol abuse. Quit 70s then quit later in 80s-present             Family History:     Family History   Problem Relation Age of Onset     Depression Mother      Glaucoma Maternal Grandfather      Cancer No family hx of         No family history of skin cancer     Macular Degeneration No family hx of              Allergies:   No Known Allergies          Medications:     Medications Prior to Admission   Medication Sig Dispense Refill Last Dose     blood glucose monitoring (ONE TOUCH DELICA) lancets Use to test blood sugars 2 times daily or as directed. 100 each 11 Taking     blood glucose monitoring (ONE TOUCH ULTRA MINI) meter device kit Use to test blood sugars 2 times daily or as directed. 1 kit 0 Taking     blood glucose monitoring (ONETOUCH ULTRA) test strip Use to test blood sugars 2 times daily or as directed. 100 strip 11 Taking     order for DME Equipment being ordered: Bariatric Lift chair  Diagnosis - morbid obesity, CHF, Lymphedema    Fax to Ne from [facility] [employer] at [phone]. 1 Units 0 Taking     order for DME Equipment being ordered: Other: Velcro Compression stockings.   Treatment Diagnosis: bilateral lymphedema. 2 each 0 Taking     [DISCONTINUED] levothyroxine (SYNTHROID/LEVOTHROID) 175 MCG tablet Take 1 tablet (175 mcg) by mouth daily 30 tablet 11 3/6/2019 at AM     [DISCONTINUED] lisinopril (PRINIVIL/ZESTRIL) 5 MG tablet Take 1 tablet (5 mg) by mouth daily Take in Pm 60 tablet 3 3/5/2019 at PM     [DISCONTINUED] metoprolol succinate ER  "(TOPROL-XL) 100 MG 24 hr tablet Take 0.5 tablets (50 mg) by mouth daily (Patient taking differently: Take 100 mg by mouth daily ) 30 tablet 11 3/5/2019 at PM     [DISCONTINUED] nystatin (MYCOSTATIN) cream Apply twice daily to affected area (Patient taking differently: Apply topically twice daily to affected area (legs and feet) once every Friday.) 30 g 3 2/22/2019     [DISCONTINUED] potassium chloride (KLOR-CON) 20 MEQ packet Take 40 mEq by mouth 2 times daily 360 packet 3 3/6/2019 at 1 dose - AM     [DISCONTINUED] spironolactone (ALDACTONE) 25 MG tablet Take 1 tablet (25 mg) by mouth daily 30 tablet 3 3/6/2019 at AM     [DISCONTINUED] tamsulosin (FLOMAX) 0.4 MG capsule Take 1 capsule (0.4 mg) by mouth daily 30 capsule 11 3/5/2019 at PM     [DISCONTINUED] torsemide (DEMADEX) 100 MG tablet Take 1.5 tablets (150 mg) by mouth 2 times daily 90 tablet 3 3/6/2019 at 1 dose - AM             Review of Systems:   EYES: Denies blurry or double vision  EARS/NOSE/THROAT: Denies sore throat, dysphagia  RESP: Denies exertional shortness of breath, productive cough, hemoptysis  CARDIO: Per HPI  ABD: Denies nausea, vomiting, heart burn, abdominal pain, constipation, diarrhea, melena  : Denies dysuria or hematuria  MSK: Denies significant fatigue or muscle aches  NEURO: Per HPI  HEME: Denies bruising, bleeding petechiae   ENDO: Denies heat/cold intolerance, polyuria, polydipsia  **Remainder of the ROS is negative except that commented on in HPI.         Exam:   /57 (BP Location: Right arm)   Pulse 90   Temp 97.4  F (36.3  C) (Oral)   Resp 18   Ht 1.803 m (5' 11\")   Wt (!) 171.8 kg (378 lb 12 oz)   SpO2 97%   BMI 52.82 kg/m    GEN: aao x 3, NAD  Neck: Unable to accurately assess his JVP, given his habitus  LUNGS: No wheezing or rales  HEART: S1S2 audible, no murmurs or rubs. +Irregular  ABDOMEN: Soft, nt, nd. +BS  EXTREMITIES: Warm calves, +DPs, 1+ bilateral LE edema  NEURO: aao x 3, no focal deficits           Data: "   ROUTINE ICU LABS (Last four results)  CMP  Recent Labs   Lab 03/17/19  0715 03/16/19  0629 03/15/19  1622 03/15/19  0537 03/14/19  0617    139  --  139 140   POTASSIUM 3.4 3.4 3.6 3.6 3.7   CHLORIDE 101 102  --  103 104   CO2 30 30  --  27 28   ANIONGAP 9 7  --  9 8   * 110*  --  120* 113*   BUN 24 26  --  24 23   CR 1.05 1.04  --  1.14 1.22   GFRESTIMATED 72 73  --  65 60*   GFRESTBLACK 84 85  --  76 70   CHERI 9.0 9.0  --  8.8 9.1   MAG 2.1 2.1  --  2.0 2.1     CBC  Recent Labs   Lab 03/16/19  0629 03/15/19  0537 03/14/19  0617 03/13/19  0613   WBC 7.7 9.1 7.6 8.9   RBC 4.76 4.58 4.52 4.49   HGB 14.7 13.9 13.9 13.7   HCT 45.9 43.6 43.6 43.1   MCV 96 95 97 96   MCH 30.9 30.3 30.8 30.5   MCHC 32.0 31.9 31.9 31.8   RDW 12.8 12.8 13.2 13.1    242 239 247     INR  Recent Labs   Lab 03/17/19  0715 03/16/19  0629 03/15/19  0537 03/14/19  0617   INR 2.67* 2.19* 2.18* 1.86*     Arterial Blood GasNo lab results found in last 7 days.           Imaging:   Echocardiogram:  3/7/19  Borderline (EF 50-55%) reduced left ventricular function is present.  Right ventricular systolic dysfunction appears mild.  No significant valve dysfunction.  No vegetation detected.             Assessment and Recomendations:   Assessment and Recs:  - 68M, PMH of DM II, HTN, HPL, THONG, PAD, depression, prior HFrEF (now recovered LVEF), atrial fibrillation (s/p ablation in 2007, now in 100% AFib, on warfarin), who was initially admitted with sepsis, which has now resolved. Cardiology consulted for episodes of RVR with atrial fibrillation.     - His resting heart rate is mostly in the 70s-90s, with postural changes this can go all the way up to 160's, albeit very transiently. This is most likely secondary to his deconditioning. We expect this to improve as he fully recovers from this sepsis hospitalization and undergoes rehab. With higher dose BB, he tends to become hypotensive and higher dose BB will also drive down his resting HR  which may make him feel more tired/fatigued than usual.   - Therefore, we will recommend discharging him on his current BB dose (100 mg Toprol XL), with a 14 day ZioPatch. If his ZioPatch recordings show more frequent and longlasting fast heart rate paroxysms, we could consider additional interventions.   - Will recommend follow up with his out patient cardiologist (Dr. Willoughby).       Patient seen and discussed with Dr. Kathi Malik MD  Cardiology Fellow  [phone]    I very much appreciated the opportunity to see and assess Mr Clarke Moreira in the hospital with CV Fellow  and resident.     I agree with the note above which  summarizes my findings and current recommendations.  Please do not hesitate to contact my office if you have any questions or concerns.      Gumaro Armenta MD  Cardiac Arrhythmia Service  [facility]  [phone]

## 2019-03-17 NOTE — PROGRESS NOTES
Social Work Services Progress Note     Hospital Day: 12  Date of Initial Social Work Evaluation:    Collaborated with:   TCU and Chart Review     Data:  Clarke is a 68 year old male admitted to [facility] 3/6/19 with severe sepsis and MICHELLE.      Intervention:   TCU has no bed available today (3/17/2019). Per chart review, pt had HR concerns and is now planned to have a Cardiology EP. Pt is accepted for placement at  TCU pending an open bed. SW will continue to follow.      Assessment: [facility] does not have any open beds this weekend. This is patients only choice of facility.     Plan:    Anticipated Disposition:  [facility]    Barriers to d/c plan:  Open bed    Follow Up:  MERVAT following     Guera Siddiqi  Weekend   Pager: [phone]

## 2019-03-18 ENCOUNTER — APPOINTMENT (OUTPATIENT)
Dept: PHYSICAL THERAPY | Facility: CLINIC | Age: 69
DRG: 856 | End: 2019-03-18
Payer: COMMERCIAL

## 2019-03-18 ENCOUNTER — APPOINTMENT (OUTPATIENT)
Dept: OCCUPATIONAL THERAPY | Facility: CLINIC | Age: 69
DRG: 856 | End: 2019-03-18
Payer: COMMERCIAL

## 2019-03-18 LAB
ANION GAP SERPL CALCULATED.3IONS-SCNC: 8 MMOL/L (ref 3–14)
BUN SERPL-MCNC: 24 MG/DL (ref 7–30)
CALCIUM SERPL-MCNC: 9 MG/DL (ref 8.5–10.1)
CHLORIDE SERPL-SCNC: 103 MMOL/L (ref 94–109)
CO2 SERPL-SCNC: 30 MMOL/L (ref 20–32)
CREAT SERPL-MCNC: 0.98 MG/DL (ref 0.66–1.25)
GFR SERPL CREATININE-BSD FRML MDRD: 79 ML/MIN/{1.73_M2}
GLUCOSE BLDC GLUCOMTR-MCNC: 108 MG/DL (ref 70–99)
GLUCOSE BLDC GLUCOMTR-MCNC: 127 MG/DL (ref 70–99)
GLUCOSE BLDC GLUCOMTR-MCNC: 128 MG/DL (ref 70–99)
GLUCOSE BLDC GLUCOMTR-MCNC: 94 MG/DL (ref 70–99)
GLUCOSE SERPL-MCNC: 117 MG/DL (ref 70–99)
INR PPP: 3.08 (ref 0.86–1.14)
INTERPRETATION ECG - MUSE: NORMAL
INTERPRETATION ECG - MUSE: NORMAL
MAGNESIUM SERPL-MCNC: 2.1 MG/DL (ref 1.6–2.3)
POTASSIUM SERPL-SCNC: 3.5 MMOL/L (ref 3.4–5.3)
SODIUM SERPL-SCNC: 140 MMOL/L (ref 133–144)

## 2019-03-18 PROCEDURE — 85610 PROTHROMBIN TIME: CPT | Performed by: STUDENT IN AN ORGANIZED HEALTH CARE EDUCATION/TRAINING PROGRAM

## 2019-03-18 PROCEDURE — 97110 THERAPEUTIC EXERCISES: CPT | Mod: GP

## 2019-03-18 PROCEDURE — 83735 ASSAY OF MAGNESIUM: CPT | Performed by: STUDENT IN AN ORGANIZED HEALTH CARE EDUCATION/TRAINING PROGRAM

## 2019-03-18 PROCEDURE — 97530 THERAPEUTIC ACTIVITIES: CPT | Mod: GP

## 2019-03-18 PROCEDURE — 12000001 ZZH R&B MED SURG/OB UMMC

## 2019-03-18 PROCEDURE — 25000132 ZZH RX MED GY IP 250 OP 250 PS 637

## 2019-03-18 PROCEDURE — 25000132 ZZH RX MED GY IP 250 OP 250 PS 637: Performed by: STUDENT IN AN ORGANIZED HEALTH CARE EDUCATION/TRAINING PROGRAM

## 2019-03-18 PROCEDURE — 00000146 ZZHCL STATISTIC GLUCOSE BY METER IP

## 2019-03-18 PROCEDURE — 97116 GAIT TRAINING THERAPY: CPT | Mod: GP

## 2019-03-18 PROCEDURE — 36415 COLL VENOUS BLD VENIPUNCTURE: CPT | Performed by: STUDENT IN AN ORGANIZED HEALTH CARE EDUCATION/TRAINING PROGRAM

## 2019-03-18 PROCEDURE — 80048 BASIC METABOLIC PNL TOTAL CA: CPT | Performed by: STUDENT IN AN ORGANIZED HEALTH CARE EDUCATION/TRAINING PROGRAM

## 2019-03-18 PROCEDURE — 97140 MANUAL THERAPY 1/> REGIONS: CPT | Mod: GO | Performed by: OCCUPATIONAL THERAPIST

## 2019-03-18 PROCEDURE — 99232 SBSQ HOSP IP/OBS MODERATE 35: CPT | Mod: GC | Performed by: INTERNAL MEDICINE

## 2019-03-18 RX ORDER — WARFARIN SODIUM 5 MG/1
12.5 TABLET ORAL DAILY
Qty: 75 TABLET | Refills: 11 | Status: ON HOLD | DISCHARGE
Start: 2019-03-18 | End: 2019-04-03

## 2019-03-18 RX ORDER — POTASSIUM CHLORIDE 1500 MG/1
40 TABLET, EXTENDED RELEASE ORAL 2 TIMES DAILY
Refills: 0 | Status: ON HOLD | DISCHARGE
Start: 2019-03-18 | End: 2019-04-03

## 2019-03-18 RX ADMIN — Medication 75 MG: at 09:59

## 2019-03-18 RX ADMIN — POTASSIUM CHLORIDE 40 MEQ: 750 TABLET, EXTENDED RELEASE ORAL at 10:02

## 2019-03-18 RX ADMIN — LEVOTHYROXINE SODIUM 175 MCG: 25 TABLET ORAL at 10:01

## 2019-03-18 RX ADMIN — CHLORHEXIDINE GLUCONATE 15 ML: 1.2 RINSE ORAL at 10:03

## 2019-03-18 RX ADMIN — ASPIRIN 81 MG: 81 TABLET, COATED ORAL at 10:01

## 2019-03-18 RX ADMIN — ATORVASTATIN CALCIUM 40 MG: 40 TABLET, FILM COATED ORAL at 21:09

## 2019-03-18 RX ADMIN — CHLORHEXIDINE GLUCONATE 15 ML: 1.2 RINSE ORAL at 19:05

## 2019-03-18 RX ADMIN — METOPROLOL SUCCINATE 100 MG: 50 TABLET, EXTENDED RELEASE ORAL at 10:01

## 2019-03-18 RX ADMIN — POTASSIUM CHLORIDE 40 MEQ: 750 TABLET, EXTENDED RELEASE ORAL at 19:03

## 2019-03-18 RX ADMIN — TAMSULOSIN HYDROCHLORIDE 0.4 MG: 0.4 CAPSULE ORAL at 10:03

## 2019-03-18 RX ADMIN — Medication 75 MG: at 16:20

## 2019-03-18 ASSESSMENT — ACTIVITIES OF DAILY LIVING (ADL)
ADLS_ACUITY_SCORE: 19

## 2019-03-18 NOTE — PROGRESS NOTES
Social Work Services Progress Note    Hospital Day: 13  Date of Initial Social Work Evaluation:    Collaborated with:  [facility]CU, MD    Data:  Clarke is a 68 year old male admitted to [facility] 3/6/19 with severe sepsis and MICHELLE. Medically cleared to discharge to [facility]    Intervention:  3/18: [facility] will not take Ziopatch. ROYAL updated MD who consulted with CARDS and patient can go without the Ziopatch to TCU. Hospitalist will review tomorrow as admissions feels patient is not appropriate due to heart rate. Medicine and CARDS have cleared him for discharge.    ROYAL requested admissions liaison Any facilitate an MD to MD discussion tomorrow if hospitalist continues to have concerns as so they can discuss directly. ROYAL provided name and number for M1 resident.    Assessment:  3/19: ROYAL informed by Beth patient is OK from hospitalist, but is #4 on the list for admission    Plan:    Anticipated Disposition:  Facility:   TCU    Barriers to d/c plan:  Bed availability    Follow Up:  royal following    Kyra TRAN, AARON  5B  (Medical/Surgical)  Phone: [phone]  Pager: [phone]

## 2019-03-18 NOTE — PLAN OF CARE
Pt A&O. VSS on 2L via NC while sleeping. Telemetry in place, afib HR in 70's-90's at rest. Sitting edge of bed x 1, HR max 160's. Denies pain/nausea. Lymph wraps in place. Plan for discharge to  TCU when bed available.

## 2019-03-18 NOTE — PLAN OF CARE
Pt's VSS with HR was slightly tachycardic today. O2 sats has remained in the high 90's% on RA. Pt just wanted to have NC in nose for comfort. Up working with PT without any problems. Lymphedema wrap done on jen LE this am. Dressing on R great toe c/d/I. Alert and oriented X4, denied any pain. BG values have been within acceptable range. Pt tolerated regular diet well, voided per self in urinal. Pt has been stable, able to make needs known.

## 2019-03-18 NOTE — PLAN OF CARE
Per EKG . Once pt has discharge order, please call EKG tech at *[phone] one hr before discharge so pt can be prep for outpatient cardiac monitoring.

## 2019-03-18 NOTE — PLAN OF CARE
Edema 5A: Recommend continued use of compression wraps to B LEs to manage chronic LE edema and protect skin integrity. Reapplication of GCB from MTPs to knee creases. Remove wraps if causing pain/numbness or become soiled.

## 2019-03-18 NOTE — PROVIDER NOTIFICATION
[facility] notified of pt having pain across right collar bone area, radiating to back. No SOB, no sternal chest pain, VSS. EKG ordered, unchanged (a-fib, HR 80's). Will continue to monitor.

## 2019-03-18 NOTE — PLAN OF CARE
Discharge Planner PT   Patient plan for discharge: TCU  Current status: Patient is Sravanthi-ModA for LEs only with bed mobility. Performed 8x sit<>stand from lower surface with FWW and SBA, needing long rest breaks throughout. Patient ambulated 120'+25' with FWW, SBA and w/c follow. Did very well with ambulation today, showing good improvements with activity tolerance.  Barriers to return to prior living situation: Medical, level of assist, safety, LE weakness, decreased activity tolerance, balance.  Recommendations for discharge: TCU  Rationale for recommendations: Patient would greatly benefit from additional skilled PT services after discharge to address the above mentioned mobility deficits.       Entered by: Apryl Rose 03/18/2019 1:11 PM

## 2019-03-18 NOTE — PROGRESS NOTES
[facility], [place]  Progress Note - [place] [facility] Service   Date of Admission:  3/6/2019    Assessment & Plan   Clarke Moreira is a 68 year old male with PMH of afib on warfarin, HFpEF, T2DM, and recent dental work (2/26/19) who is admitted on 3/6/2019 with severe sepsis and MICHELLE. Likely 2/2 sinusitis and possible contribution of excessive diuresis/poor PO intake.  Infection resolved.  Afib/ rvr improved.  Ready for discharge.      Today:  Had planned for discharge, but TCU not willing to accept with zio patch.  Per EP, ok to defer zio patch until out of TCU  Medically ready for discharge    Afib RVR: Resolved  Afib s/p ablation 2008 on warfarin: Now with improved rate control and improved hemodynamic stability.  Esau in HR are thought to be due to deconditioning and are improving with physical therapy.  Also likely not as high as they appear on the monitor as patient would be more symptomatic if sustained HRs of 200BPM.     - warfarin dosing per pharmacy  - Consulted EP  - metoprolol succinate 100mg daily  - 14 day zio patch at discharge from TCU  - Follow up with Dr. Willoughby to review  - PT/OT consults, strength and ADLs, and lymphedema    Orthostatic hypotension: Resolved  - Reduced home BP meds  - Continue with leg wraps    Left temple lesion:   Hx BCC: Site of prior skin biopsy/exisino.  Per patient has become chronic, never fully heals and bleeds easily.  Path from 2014 consistent with BCC.  Concerning for incomplete resection/marjolin's ulcer?    - Outpatient follow up with PCP or dermatology    Severe sepsis: Resolved  Recent dental procedures with tooth extraction 2/26/19:  Left Maxillary sinusitis: Sepsis resolved.  Suspect oral/sinusitus as source given timing and CT findings.  Completed course of augmentin.    MICHELLE: Creatinine >4 on admission.  Resolved with fluids.  Likely over diuresis in combination with sepsis.      Chronic Issues:  Hx HFpEF:  Follows in [facility]  Hx  "HTN:   - Torsemide 75mg BID  - Holding spironolactone and lisinopril  - Potassium 40 AM BID  Diabetic foot ulcers:  Hx PAD:  S/p bedside debridement by podiatry.    - Daily dressing changes, keep clean/dry  - WBAT w/ protective footwear for transfers and PT  - Follow up in podiatry clinic 1-2 weeks after discharge   WBAT  - Offloading boots  Prior CVA:  CT head, MRI/MRA brain with no acute infarct  THONG:  CPAP used at home, patient declines CPAP here in hospital, does not like our masks  DMII:  Last A1c 6.1 on 2/26/19.  Will plan to resume PTA metformin at discharge  Hypothyroidism: PTA levothyroxine  Depression/anxiety: NTD  Urinary retention: Continue PTA tamsulosin     Diet: Consistent Carbohydrate Diet 9938-2160 Calories: Moderate Consistent CHO (4-6 CHO units/meal)  Advance Diet as Tolerated    Fluids: None  Lines: PIV  DVT Prophylaxis: PTA Warfarin, pharmacy to dose  Bazzi Catheter: not present  Code Status: DNR/DNI  patient would like to discuss pressors if he needs this, if he is unable to discuss pressors because he is so altered/hypotensive, then he would NOT want pressors and the medical team should \"just let me go\"    Disposition Plan   Expected discharge: TCU tomorrow  Entered: Jesús Olivas MD 03/18/2019, 4:24 PM     Jesús Olivas MD  Internal Medicine PGY1   ______________________________________________________________________    Interval History    No overnight events  Nursing notes reviewed  Ocasional asymptomatic tachycardia yesterday with exertion  Otherwise 4-point ROS negative.      Data reviewed today: I reviewed all medications, new labs and imaging results over the last 24 hours.    Physical Exam   Vital Signs: Temp: 96.7  F (35.9  C) Temp src: Axillary BP: 110/64   Heart Rate: 99 Resp: 16 SpO2: 97 % O2 Device: Nasal cannula Oxygen Delivery: 2 LPM  Weight: 378 lbs 12 oz    GEN: Adult male, resting supine in bed. NAD  HEENT: No discharge visible at the nares, left paranasal " tenderness has resolved  CV: Normal rate, irregularly irregular, NMRG. Warm, well-perfused extremities, unable to appreciate JVD  RESP: CTAB from the anterior, possibly slightly decreased at the bases, normal WOB  MSK: Bilateral legs wrapped, edema improved from prior with wraps  Skin: Warm, dry. Left temple lesion with depressed center and raised borders unchanged

## 2019-03-18 NOTE — PHARMACY
Clinical Pharmacy - Warfarin Dosing Consult     Pharmacy has been consulted to manage this patient s warfarin therapy.       INR   Date Value Ref Range Status   03/18/2019 3.08 (H) 0.86 - 1.14 Final   03/17/2019 2.67 (H) 0.86 - 1.14 Final       Given supratherapeutic INR level, recommend to hold warfarin therapy today.  Pharmacy will monitor Clarke Moreira daily and order warfarin doses to achieve specified goal.  If patient is to be discharged today, please have patient hold warfarin dose tonight and recheck INR in clinic within 1-2 days of discharge for appropriate follow up.     Thank you.

## 2019-03-19 ENCOUNTER — APPOINTMENT (OUTPATIENT)
Dept: OCCUPATIONAL THERAPY | Facility: CLINIC | Age: 69
DRG: 856 | End: 2019-03-19
Payer: COMMERCIAL

## 2019-03-19 ENCOUNTER — APPOINTMENT (OUTPATIENT)
Dept: PHYSICAL THERAPY | Facility: CLINIC | Age: 69
DRG: 856 | End: 2019-03-19
Payer: COMMERCIAL

## 2019-03-19 LAB
ANION GAP SERPL CALCULATED.3IONS-SCNC: 8 MMOL/L (ref 3–14)
BUN SERPL-MCNC: 23 MG/DL (ref 7–30)
CALCIUM SERPL-MCNC: 9 MG/DL (ref 8.5–10.1)
CHLORIDE SERPL-SCNC: 102 MMOL/L (ref 94–109)
CO2 SERPL-SCNC: 30 MMOL/L (ref 20–32)
CREAT SERPL-MCNC: 0.92 MG/DL (ref 0.66–1.25)
GFR SERPL CREATININE-BSD FRML MDRD: 85 ML/MIN/{1.73_M2}
GLUCOSE BLDC GLUCOMTR-MCNC: 104 MG/DL (ref 70–99)
GLUCOSE BLDC GLUCOMTR-MCNC: 109 MG/DL (ref 70–99)
GLUCOSE BLDC GLUCOMTR-MCNC: 121 MG/DL (ref 70–99)
GLUCOSE BLDC GLUCOMTR-MCNC: 130 MG/DL (ref 70–99)
GLUCOSE BLDC GLUCOMTR-MCNC: 93 MG/DL (ref 70–99)
GLUCOSE SERPL-MCNC: 120 MG/DL (ref 70–99)
INR PPP: 2.63 (ref 0.86–1.14)
MAGNESIUM SERPL-MCNC: 2.2 MG/DL (ref 1.6–2.3)
POTASSIUM SERPL-SCNC: 3.5 MMOL/L (ref 3.4–5.3)
SODIUM SERPL-SCNC: 140 MMOL/L (ref 133–144)

## 2019-03-19 PROCEDURE — 25000132 ZZH RX MED GY IP 250 OP 250 PS 637: Performed by: STUDENT IN AN ORGANIZED HEALTH CARE EDUCATION/TRAINING PROGRAM

## 2019-03-19 PROCEDURE — 00000146 ZZHCL STATISTIC GLUCOSE BY METER IP

## 2019-03-19 PROCEDURE — 97530 THERAPEUTIC ACTIVITIES: CPT | Mod: GP

## 2019-03-19 PROCEDURE — 83735 ASSAY OF MAGNESIUM: CPT | Performed by: STUDENT IN AN ORGANIZED HEALTH CARE EDUCATION/TRAINING PROGRAM

## 2019-03-19 PROCEDURE — 99232 SBSQ HOSP IP/OBS MODERATE 35: CPT | Mod: GC | Performed by: INTERNAL MEDICINE

## 2019-03-19 PROCEDURE — 25000132 ZZH RX MED GY IP 250 OP 250 PS 637: Performed by: INTERNAL MEDICINE

## 2019-03-19 PROCEDURE — 97535 SELF CARE MNGMENT TRAINING: CPT | Mod: GO

## 2019-03-19 PROCEDURE — 85610 PROTHROMBIN TIME: CPT | Performed by: STUDENT IN AN ORGANIZED HEALTH CARE EDUCATION/TRAINING PROGRAM

## 2019-03-19 PROCEDURE — 12000001 ZZH R&B MED SURG/OB UMMC

## 2019-03-19 PROCEDURE — 36415 COLL VENOUS BLD VENIPUNCTURE: CPT | Performed by: STUDENT IN AN ORGANIZED HEALTH CARE EDUCATION/TRAINING PROGRAM

## 2019-03-19 PROCEDURE — 25000132 ZZH RX MED GY IP 250 OP 250 PS 637

## 2019-03-19 PROCEDURE — 80048 BASIC METABOLIC PNL TOTAL CA: CPT | Performed by: STUDENT IN AN ORGANIZED HEALTH CARE EDUCATION/TRAINING PROGRAM

## 2019-03-19 PROCEDURE — 97116 GAIT TRAINING THERAPY: CPT | Mod: GP

## 2019-03-19 RX ORDER — SENNOSIDES 8.6 MG
8.6 TABLET ORAL DAILY PRN
Status: DISCONTINUED | OUTPATIENT
Start: 2019-03-19 | End: 2019-03-22 | Stop reason: HOSPADM

## 2019-03-19 RX ORDER — WARFARIN SODIUM 5 MG/1
10 TABLET ORAL
Status: COMPLETED | OUTPATIENT
Start: 2019-03-19 | End: 2019-03-19

## 2019-03-19 RX ADMIN — LEVOTHYROXINE SODIUM 175 MCG: 25 TABLET ORAL at 07:54

## 2019-03-19 RX ADMIN — ATORVASTATIN CALCIUM 40 MG: 40 TABLET, FILM COATED ORAL at 20:33

## 2019-03-19 RX ADMIN — CHLORHEXIDINE GLUCONATE 15 ML: 1.2 RINSE ORAL at 20:32

## 2019-03-19 RX ADMIN — Medication 75 MG: at 07:55

## 2019-03-19 RX ADMIN — POTASSIUM CHLORIDE 40 MEQ: 750 TABLET, EXTENDED RELEASE ORAL at 20:32

## 2019-03-19 RX ADMIN — POTASSIUM CHLORIDE 40 MEQ: 750 TABLET, EXTENDED RELEASE ORAL at 07:54

## 2019-03-19 RX ADMIN — TAMSULOSIN HYDROCHLORIDE 0.4 MG: 0.4 CAPSULE ORAL at 07:55

## 2019-03-19 RX ADMIN — WARFARIN SODIUM 10 MG: 5 TABLET ORAL at 18:49

## 2019-03-19 RX ADMIN — CHLORHEXIDINE GLUCONATE 15 ML: 1.2 RINSE ORAL at 07:53

## 2019-03-19 RX ADMIN — METOPROLOL SUCCINATE 100 MG: 50 TABLET, EXTENDED RELEASE ORAL at 07:54

## 2019-03-19 RX ADMIN — ASPIRIN 81 MG: 81 TABLET, COATED ORAL at 07:54

## 2019-03-19 RX ADMIN — Medication 75 MG: at 15:58

## 2019-03-19 ASSESSMENT — ACTIVITIES OF DAILY LIVING (ADL)
ADLS_ACUITY_SCORE: 19

## 2019-03-19 NOTE — PLAN OF CARE
Time: 1709-4018     Reason for admission: MICHELLE, Sepsis, hypotension. Tachycardia  Vitals: VSS on RA  Activity: A1/2+W  Pain: Denies, slept throughout the night  Neuro: A&Ox4  Cardiac: WDL  Respiratory: WDL on RA, denies SOB  GI/: Voiding spontaneously with high output. Urinal at bedside. No BM on shift. Denies N/V..  Diet: Consistent Carb  Lines: PIV SL  Plan: Discharge to TCU when stable     Continue to monitor and follow POC

## 2019-03-19 NOTE — PLAN OF CARE
Pt has been improving significantly. VS within acceptable range, HR has been btwn , depending on activity levels. Denied any pain, tolerated diet well, up working with PT/OT without any problems. Lymph wraps on jen LE intact since yesterday morning treatment. INR 2.63 this am, down from 3.08 yesterday. Pt has been stable, awaiting for placement at ICU.

## 2019-03-19 NOTE — PLAN OF CARE
Discharge Planner PT   Patient plan for discharge: TCU  Current status: Patient seen for progress of functional mobility. Supine<>sit from flat bed with Sravanthi for LEs. MaxA for donning/doff socks/shoes. Patient STSx2+6 with Sravanthi-SBA depending on surface height. Patient ambulated 60'+25'+60' with FWW, close CGA 2/2 some instability with gait and w/c follow. Increased difficulty with ambulation 2/2 L ankle pain, required increased seated/standing rest breaks today.  Barriers to return to prior living situation: Medical, level of assist, safety, balance, gait, transfers, LE weakness  Recommendations for discharge: TCU  Rationale for recommendations: Patient would greatly benefit from additional skilled PT services provided in a TCU setting.       Entered by: Apryl Rose 03/19/2019 11:59 AM

## 2019-03-19 NOTE — PROGRESS NOTES
[facility], [place]  Progress Note - Maria 1 Service   Date of Admission:  3/6/2019    Assessment & Plan   Clarke Moreira is a 68 year old male with PMH of afib on warfarin, HFpEF, T2DM, and recent dental work (2/26/19) who is admitted on 3/6/2019 with severe sepsis and MICHELLE. Likely 2/2 sinusitis and possible contribution of excessive diuresis/poor PO intake.  Infection resolved.  Afib/ rvr improved.  Ready for discharge.      Today:  Discharge pending bed availability at TCU    Afib RVR: Resolved  Afib s/p ablation 2008 on warfarin: Now with improved rate control and improved hemodynamic stability.  Esau in HR are thought to be due to deconditioning and are improving with physical therapy.  Also likely not as high as they appear on the monitor as patient would be more symptomatic if sustained HRs of 200BPM.     - warfarin dosing per pharmacy  - Consulted EP who are comfortable with current rate control  - metoprolol succinate 100mg daily  - 14 day zio patch at discharge from TCU  - Follow up with Dr. Willoughby to review  - PT/OT consults, strength and ADLs, and lymphedema    Left temple lesion:   Hx BCC: Site of prior skin biopsy/exisino.  Per patient has become chronic, never fully heals and bleeds easily.  Path from 2014 consistent with BCC.  Concerning for incomplete resection/marjolin's ulcer?    - Outpatient follow up with PCP or dermatology    Severe sepsis: Resolved  Recent dental procedures with tooth extraction 2/26/19:  Left Maxillary sinusitis: Sepsis resolved.  Suspect oral/sinusitus as source given timing and CT findings.  Completed course of augmentin.    Orthostatic hypotension: Resolved  - Reduced home BP meds  - Continue with leg wraps    MICHELLE: Creatinine >4 on admission.  Resolved with fluids.  Likely over diuresis in combination with sepsis.      Chronic Issues:  Hx HFpEF:  Follows in [facility]  Hx HTN:   - Torsemide 75mg BID  - Holding spironolactone and  "lisinopril  - Potassium 40 AM BID  Diabetic foot ulcers:  Hx PAD:  S/p bedside debridement by podiatry.    - Daily dressing changes, keep clean/dry  - WBAT w/ protective footwear for transfers and PT  - Follow up in podiatry clinic 1-2 weeks after discharge   WBAT  - Offloading boots  Prior CVA:  CT head, MRI/MRA brain with no acute infarct  THONG:  CPAP used at home, patient declines CPAP here in hospital, does not like our masks  DMII:  Last A1c 6.1 on 2/26/19.  Will plan to resume PTA metformin at discharge  Hypothyroidism: PTA levothyroxine  Depression/anxiety: NTD  Urinary retention: Continue PTA tamsulosin     Diet: Consistent Carbohydrate Diet 0596-1529 Calories: Moderate Consistent CHO (4-6 CHO units/meal)  Advance Diet as Tolerated    Fluids: None  Lines: PIV  DVT Prophylaxis: PTA Warfarin, pharmacy to dose  Bazzi Catheter: not present  Code Status: DNR/DNI  patient would like to discuss pressors if he needs this, if he is unable to discuss pressors because he is so altered/hypotensive, then he would NOT want pressors and the medical team should \"just let me go\"    Disposition Plan   Expected discharge: TCU tomorrow  Entered: Jesús Olivas MD 03/19/2019, 1:28 PM     Patient staffed with Dr. Lyly Olivas MD  Internal Medicine PGY1   ______________________________________________________________________    Interval History    No overnight events  Nursing notes reviewed  Otherwise 4-point ROS negative.      Data reviewed today: I reviewed all medications, new labs and imaging results over the last 24 hours.    Physical Exam   Vital Signs: Temp: 96.1  F (35.6  C) Temp src: Oral BP: 127/88   Heart Rate: 104 Resp: 18 SpO2: 98 % O2 Device: None (Room air)    Weight: 378 lbs 12 oz    GEN: Adult male, resting supine in bed. NAD  HEENT: No discharge visible at the nares, left paranasal tenderness has resolved  CV: Normal rate, irregularly irregular, NMRG. Warm, well-perfused extremities, " unable to appreciate JVD  RESP: CTAB from the anterior, possibly slightly decreased at the bases, normal WOB  MSK: Bilateral legs wrapped, edema improved from prior with wraps  Skin: Warm, dry. Left temple lesion with depressed center and raised borders unchanged

## 2019-03-19 NOTE — PLAN OF CARE
"Time 0241-7490     Reason for admission: MICHELLE, Sepsis, hypotension. Tachycardia  Vitals: VSS on RA    /60 (BP Location: Right arm)   Pulse 90   Temp 97.6  F (36.4  C) (Oral)   Resp 16   Ht 1.803 m (5' 11\")   Wt (!) 171.8 kg (378 lb 12 oz)   SpO2 95%   BMI 52.82 kg/m      Activity: A1+W  Pain: Denies  Neuro: A&Ox4  Cardiac: WDL, denies chest pain  Respiratory: WDL on RA, denies SOB  GI/: Voiding spontaneously with high output. No BM on shift. Denies nausea.  Diet: Consistent Carb  Lines: PIV  Labs: INR 3.08  New this shift: INR 3.08, held warfarin. Wound care and dressing change done on R toe. Ortho BPs completed.    Plan: Discharge to TCU when stable      Continue to monitor and follow POC  "

## 2019-03-19 NOTE — PLAN OF CARE
OT/5A:  Discharge Planner OT   Patient plan for discharge: Rehab  Current status: Max A to don shoes in supine. Patient educated on adaptive equipment/compensatory strategies in anticipation of progression to donning independently. Ambulating to bathroom with 2WW and close CGA. Benefits from cues to maintain close proximity to walker, with patient endorsing some increased L ankle pain this date. Progressing toilet transfer technique from Mod A to Min A, with cues for body mechanics and optimal UE placement on grab bars. Requires extended rest breaks between trials. Difficulty obtaining O2/HR upon return to EOB Changing pulse ox with HR in low 100's, O2 98%. /88. Patient endorsing sweat, but denying other signs/symptoms of activity intolerance.   Barriers to return to prior living situation: Medical Status, Activity Tolerance, Strength  Recommendations for discharge: TCU  Rationale for recommendations: Patient would benefit from ongoing therapies to progress ADL/Mobility independence and safety.        Entered by: Gladys Medellin 03/19/2019 12:15 PM

## 2019-03-20 ENCOUNTER — APPOINTMENT (OUTPATIENT)
Dept: OCCUPATIONAL THERAPY | Facility: CLINIC | Age: 69
DRG: 856 | End: 2019-03-20
Payer: COMMERCIAL

## 2019-03-20 ENCOUNTER — APPOINTMENT (OUTPATIENT)
Dept: PHYSICAL THERAPY | Facility: CLINIC | Age: 69
DRG: 856 | End: 2019-03-20
Payer: COMMERCIAL

## 2019-03-20 LAB
ANION GAP SERPL CALCULATED.3IONS-SCNC: 9 MMOL/L (ref 3–14)
BUN SERPL-MCNC: 24 MG/DL (ref 7–30)
CALCIUM SERPL-MCNC: 8.9 MG/DL (ref 8.5–10.1)
CHLORIDE SERPL-SCNC: 102 MMOL/L (ref 94–109)
CO2 SERPL-SCNC: 29 MMOL/L (ref 20–32)
CREAT SERPL-MCNC: 0.97 MG/DL (ref 0.66–1.25)
GFR SERPL CREATININE-BSD FRML MDRD: 80 ML/MIN/{1.73_M2}
GLUCOSE BLDC GLUCOMTR-MCNC: 104 MG/DL (ref 70–99)
GLUCOSE BLDC GLUCOMTR-MCNC: 108 MG/DL (ref 70–99)
GLUCOSE BLDC GLUCOMTR-MCNC: 111 MG/DL (ref 70–99)
GLUCOSE BLDC GLUCOMTR-MCNC: 121 MG/DL (ref 70–99)
GLUCOSE BLDC GLUCOMTR-MCNC: 97 MG/DL (ref 70–99)
GLUCOSE SERPL-MCNC: 122 MG/DL (ref 70–99)
INR PPP: 2.32 (ref 0.86–1.14)
MAGNESIUM SERPL-MCNC: 2.1 MG/DL (ref 1.6–2.3)
POTASSIUM SERPL-SCNC: 3.2 MMOL/L (ref 3.4–5.3)
SODIUM SERPL-SCNC: 140 MMOL/L (ref 133–144)

## 2019-03-20 PROCEDURE — 36415 COLL VENOUS BLD VENIPUNCTURE: CPT | Performed by: STUDENT IN AN ORGANIZED HEALTH CARE EDUCATION/TRAINING PROGRAM

## 2019-03-20 PROCEDURE — 25000132 ZZH RX MED GY IP 250 OP 250 PS 637: Performed by: INTERNAL MEDICINE

## 2019-03-20 PROCEDURE — 83735 ASSAY OF MAGNESIUM: CPT | Performed by: STUDENT IN AN ORGANIZED HEALTH CARE EDUCATION/TRAINING PROGRAM

## 2019-03-20 PROCEDURE — 25000132 ZZH RX MED GY IP 250 OP 250 PS 637: Performed by: STUDENT IN AN ORGANIZED HEALTH CARE EDUCATION/TRAINING PROGRAM

## 2019-03-20 PROCEDURE — 97116 GAIT TRAINING THERAPY: CPT | Mod: GP

## 2019-03-20 PROCEDURE — 80048 BASIC METABOLIC PNL TOTAL CA: CPT | Performed by: STUDENT IN AN ORGANIZED HEALTH CARE EDUCATION/TRAINING PROGRAM

## 2019-03-20 PROCEDURE — 99232 SBSQ HOSP IP/OBS MODERATE 35: CPT | Mod: GC | Performed by: INTERNAL MEDICINE

## 2019-03-20 PROCEDURE — 97530 THERAPEUTIC ACTIVITIES: CPT | Mod: GP

## 2019-03-20 PROCEDURE — 12000001 ZZH R&B MED SURG/OB UMMC

## 2019-03-20 PROCEDURE — 25000132 ZZH RX MED GY IP 250 OP 250 PS 637

## 2019-03-20 PROCEDURE — 97140 MANUAL THERAPY 1/> REGIONS: CPT | Mod: GO | Performed by: OCCUPATIONAL THERAPIST

## 2019-03-20 PROCEDURE — 85610 PROTHROMBIN TIME: CPT | Performed by: STUDENT IN AN ORGANIZED HEALTH CARE EDUCATION/TRAINING PROGRAM

## 2019-03-20 PROCEDURE — 00000146 ZZHCL STATISTIC GLUCOSE BY METER IP

## 2019-03-20 PROCEDURE — 97110 THERAPEUTIC EXERCISES: CPT | Mod: GP

## 2019-03-20 RX ORDER — WARFARIN SODIUM 5 MG/1
10 TABLET ORAL
Status: COMPLETED | OUTPATIENT
Start: 2019-03-20 | End: 2019-03-20

## 2019-03-20 RX ADMIN — TAMSULOSIN HYDROCHLORIDE 0.4 MG: 0.4 CAPSULE ORAL at 07:55

## 2019-03-20 RX ADMIN — LEVOTHYROXINE SODIUM 175 MCG: 25 TABLET ORAL at 07:55

## 2019-03-20 RX ADMIN — POTASSIUM CHLORIDE 40 MEQ: 750 TABLET, EXTENDED RELEASE ORAL at 20:17

## 2019-03-20 RX ADMIN — Medication 75 MG: at 15:41

## 2019-03-20 RX ADMIN — WARFARIN SODIUM 10 MG: 5 TABLET ORAL at 18:06

## 2019-03-20 RX ADMIN — POTASSIUM CHLORIDE 40 MEQ: 750 TABLET, EXTENDED RELEASE ORAL at 07:55

## 2019-03-20 RX ADMIN — ATORVASTATIN CALCIUM 40 MG: 40 TABLET, FILM COATED ORAL at 20:16

## 2019-03-20 RX ADMIN — Medication 75 MG: at 07:55

## 2019-03-20 RX ADMIN — ASPIRIN 81 MG: 81 TABLET, COATED ORAL at 07:55

## 2019-03-20 RX ADMIN — CHLORHEXIDINE GLUCONATE 15 ML: 1.2 RINSE ORAL at 07:53

## 2019-03-20 RX ADMIN — METOPROLOL SUCCINATE 100 MG: 50 TABLET, EXTENDED RELEASE ORAL at 07:55

## 2019-03-20 RX ADMIN — CHLORHEXIDINE GLUCONATE 15 ML: 1.2 RINSE ORAL at 20:17

## 2019-03-20 ASSESSMENT — ACTIVITIES OF DAILY LIVING (ADL)
ADLS_ACUITY_SCORE: 19

## 2019-03-20 ASSESSMENT — MIFFLIN-ST. JEOR: SCORE: 2410.13

## 2019-03-20 NOTE — PROGRESS NOTES
[facility], [place]  Progress Note - Maria 1 Service   Date of Admission:  3/6/2019    Assessment & Plan   67 y/o male with PMHx significant for atrial fibrillation (CHADVASC 5), DM2, was admitted due to sepsis from sinusitis following dental procedure.     #Atrial fibrillation (CHADVASC 5)  #Hx of atrial flutter s/p ablation 2008  Patient on warfarin. Has had occasional elevated HR associated with physical activity. Discussed with EP and agree this is due to physical decompensation. Attempted to increase dose of metoprolol however BP did not allow. Currently tolerating PT and HR in adequate range.   - Warfarin per pharmacy  - Daily INR  - Continue 100mg metoprolol succinate  - On tele  - Zio patch after TCU discharge  - Will need outpatient follow up with Dr. Willoughby  - Will dosing per pharmacy    #Chronic diastolic heart failure  - Continue torsemide 75mg BID    #Pulmonary HTN, WHO group II  Pt on diuretics. Follows with Dr. Willoughby. Last RHC on 06/2018 showing mPA 27.   - To see Dr. Willoughby after discharge    #Left temple lesion   #Hx of BCC  Site of prior skin excisional biopsy. Per patient has become chronic, never fully heals and bleeds easily.  Path from 2014 consistent with BCC.  Concerning for incomplete resection/marjolin's ulcer    - Outpatient follow up with PCP or dermatology    #Sespsis  #Left maxillary sinusitis  #Recent dental tooth extraction  He completed augmentin on 03/12/19.     Abx  Augmentin (03/09/19 - 03/12/19)  Zosyn ( 03/06/19 - 03/09/19)  Vanc (03/06/19 - 03/08/19)    #Orthostatic hypotension  - Discontinued lisinopril and spironolactone  - Continue with leg wraps    #MICHELLE  Cr peaked at >4 on admission.  Resolved with fluids.  Likely over diuresis in combination with sepsis.      Chronic Issues:  Hx HFpEF:  Follows in [facility]  Hx HTN:   - Torsemide 75mg BID  - Holding spironolactone and lisinopril  - Potassium 40 AM BID  Diabetic foot ulcers:  Hx PAD:   "S/p bedside debridement by podiatry.    - Daily dressing changes, keep clean/dry  - WBAT w/ protective footwear for transfers and PT  - Follow up in podiatry clinic 1-2 weeks after discharge   WBAT  - Offloading boots  Prior CVA:  CT head, MRI/MRA brain with no acute infarct  THONG:  CPAP used at home, patient declines CPAP here in hospital, does not like our masks  DMII:  Last A1c 6.1 on 2/26/19.  Will plan to resume PTA metformin at discharge  Hypothyroidism: PTA levothyroxine  Depression/anxiety: NTD  Urinary retention: Continue PTA tamsulosin     Diet: Consistent Carbohydrate Diet 3915-8385 Calories: Moderate Consistent CHO (4-6 CHO units/meal)  Advance Diet as Tolerated    Fluids: None  Lines: PIV  DVT Prophylaxis: PTA Warfarin, pharmacy to dose  Bazzi Catheter: not present  Code Status: DNR/DNI  patient would like to discuss pressors if he needs this, if he is unable to discuss pressors because he is so altered/hypotensive, then he would NOT want pressors and the medical team should \"just let me go\"    Disposition Plan   Expected discharge: TCU tomorrow, pending bed availability  Entered: Roberto Alexander MD 03/20/2019, 3:05 PM     Patient staffed with Dr. Lyly Alexander MD PhD  Internal Medicine, PGY3  [phone]    Interval History    No overnight events. Motivated to discharge for rehab. Eating well. tolerating physical therapy.       Physical Exam   Vital Signs: Temp: 97.8  F (36.6  C) Temp src: Oral BP: 117/67   Heart Rate: 84 Resp: 18 SpO2: 96 % O2 Device: None (Room air)    Weight: 356 lbs 11.27 oz    GEN: AAOX3, NAD  HEENT: PERRLA, EOMI, anicteric sclera, difficult to assess JVD due to beard  CV: irregular rhythm, normal rate, no murmur, no rubs, no gallops,   RESP:CTAB  EXT: Bilateral legs wrapped, edema significantly improved   Skin: warm, dry, no petechiae or rashes    Results for orders placed or performed during the hospital encounter of 03/06/19 (from the past 24 hour(s))   Glucose by meter "   Result Value Ref Range    Glucose 93 70 - 99 mg/dL   Glucose by meter   Result Value Ref Range    Glucose 130 (H) 70 - 99 mg/dL   Glucose by meter   Result Value Ref Range    Glucose 121 (H) 70 - 99 mg/dL   Basic metabolic panel   Result Value Ref Range    Sodium 140 133 - 144 mmol/L    Potassium 3.2 (L) 3.4 - 5.3 mmol/L    Chloride 102 94 - 109 mmol/L    Carbon Dioxide 29 20 - 32 mmol/L    Anion Gap 9 3 - 14 mmol/L    Glucose 122 (H) 70 - 99 mg/dL    Urea Nitrogen 24 7 - 30 mg/dL    Creatinine 0.97 0.66 - 1.25 mg/dL    GFR Estimate 80 >60 mL/min/[1.73_m2]    GFR Estimate If Black >90 >60 mL/min/[1.73_m2]    Calcium 8.9 8.5 - 10.1 mg/dL   Magnesium   Result Value Ref Range    Magnesium 2.1 1.6 - 2.3 mg/dL   Glucose by meter   Result Value Ref Range    Glucose 108 (H) 70 - 99 mg/dL   INR   Result Value Ref Range    INR 2.32 (H) 0.86 - 1.14   Glucose by meter   Result Value Ref Range    Glucose 104 (H) 70 - 99 mg/dL

## 2019-03-20 NOTE — PLAN OF CARE
700-1500    VSS on RA. A/Ox4. Denies pain/nausea. Up with A1+W. PIV SL. Regular diet, good appetite. Voiding WDL. No BM this shift. Wounds on jen big toes CDI dressings changed. Plan to discharge to TCU pending PT approval. Continue to monitor and follow POC.

## 2019-03-20 NOTE — PLAN OF CARE
PT 5A: Up with Ax1 + FWW + gait belt    Discharge Planner PT   Patient plan for discharge: TCU  Current status: Engaged pt in supine > EOB with CGA at LEs, min A at LEs for EOB > supine, sit <> stand at CGA to SBA pending surface height + FWW, supine LE and core strengthening, standing LE strengthening exercises with good prolonged standing tolerance today, and gait with FWW at CGA for ~100ft. HR up to 130s per pulse ox during session.   Barriers to return to prior living situation: medical status, home set up, edema  Recommendations for discharge: TCU  Rationale for recommendations: Pt is below his baseline for functional mobility and would continue to benefit from rehab to improve LE strength, balance, and endurance required for return to community.        Entered by: Elizabeth De La Fuente 03/20/2019 1:29 PM

## 2019-03-20 NOTE — PROGRESS NOTES
"CLINICAL NUTRITION SERVICES - ASSESSMENT NOTE     Nutrition Prescription    RECOMMENDATIONS FOR MDs/PROVIDERS TO ORDER:  None at this time.     Malnutrition Status:    Non-severe malnutrition in the context of acute illness.     Recommendations already ordered by Registered Dietitian (RD):  Nutrition education (see below)     Future/Additional Recommendations:  1. If patient's appetite/oral intake decline, recommend re-offering scheduled snacks/ONS.    2. If consuming </= 50-75% meals, consider starting calorie counts.      REASON FOR ASSESSMENT  Clarke Moreira is a/an 68 year old male assessed by the dietitian for LOS    NUTRITION HISTORY  Information obtained from patient:   - Patient expressed having a good appetite and PO intake at baseline. However, 1-2 weeks PTA, patient reports eating maybe 1 meal/day due to decreased appetite and not feeling well with infection/sepsis.   - Reports that during his LOS, his appetite has returned and feels that he is able to choose nutritionally adequate meals on hospital menu. Denies having any nutritional questions/concerns at this time.     CURRENT NUTRITION ORDERS  Diet: Moderate Consistent Carbohydrate  Intake/Tolerance: 100% of 3 meals/day per RN flowsheet. Per [facility] review, patient ordering nutritionally adequate meals from kitchen.     LABS  K+: 3.2 (L)   2/26/19: HgA1c: 6.1% (goal < 7% with T2DM)     MEDICATIONS  Medications reviewed    ANTHROPOMETRICS  Height: 180.3 cm (5' 11\")  Most Recent Weight: (!) 161.8 kg (356 lb 11.3 oz)    IBW: 78 kg  BMI: Obesity Grade III BMI >40  Weight History: 8% wt loss in 1 month, suspect fluid related, at least in part.   Wt Readings from Last 10 Encounters:   03/20/19 (!) 161.8 kg (356 lb 11.3 oz)   02/19/19 (!) 175.4 kg (386 lb 9.6 oz)   12/12/18 (!) 175.1 kg (386 lb)   12/11/18 (!) 176.3 kg (388 lb 9.6 oz)   10/30/18 (!) 175.7 kg (387 lb 6.4 oz)   10/22/18 (!) 175.5 kg (387 lb)   10/10/18 (!) 173.3 kg (382 lb)   09/07/18 " (!) 167.8 kg (369 lb 14.4 oz)   08/28/18 (!) 167.8 kg (370 lb)   07/18/18 (!) 170.1 kg (375 lb)       Dosing Weight: 99 kg (adjusted based on IBW of 78 kg and driest wt of 162 kg on 3/20)     ASSESSED NUTRITION NEEDS  Estimated Energy Needs: 1980 - 2475 kcals/day (20 - 25 kcals/kg)  Justification: Obese  Estimated Protein Needs: 149 - 198 grams protein/day (1.5 - 2 grams of pro/kg)  Justification: Obesity guidelines  Estimated Fluid Needs: 2475 - 2970 mL/day (25 - 30 mL/kg)   Justification: Maintenance and Per provider pending fluid status    PHYSICAL FINDINGS  See malnutrition section below.    MALNUTRITION  % Intake: < 75% for > 7 days (non-severe)--improving over the past week  % Weight Loss: > 5% in 1 month (severe)  Subcutaneous Fat Loss: None observed  Muscle Loss: None observed  Fluid Accumulation/Edema: Mild  Malnutrition Diagnosis: Non-severe malnutrition in the context of acute illness.     NUTRITION DIAGNOSIS  Predicted inadequate nutrient intake (protein-energy) related to patient currently consuming 100% of 3 meals/day, however, potential for decline in PO intake/appetite and menu fatigue given prolonged hospital LOS.     INTERVENTIONS  Implementation  Nutrition Education: Provided education on encouraged patient to continue eating 100% of 3 meals/day, provided education on available ONS/scheduled snacks if appetite and/or oral intake decline and role of RD.    Medical food supplement therapy & modify composition of meals/snacks: declined at this time given he is eating well     Goals  Patient to consume % of nutritionally adequate meal trays TID, or the equivalent with supplements/snacks.     Monitoring/Evaluation  Progress toward goals will be monitored and evaluated per protocol.      Janice Lubin RD, LD  5A/5B floor pager [phone]

## 2019-03-20 NOTE — PLAN OF CARE
Edema 5A: Recommend continued use of compression wraps to further manage chronic LE edema and increase ease with functional mobility. Reapplication of GCB from MTPs to knee creases. Remove wraps if causing pain/numbness or become soiled.

## 2019-03-20 NOTE — PROGRESS NOTES
Social Work Services Progress Note     Hospital Day: 15  Collaborated with:  [facility]CU admissions Vielka     Data:  Pt is a 68 year old male admitted to [facility] 3/6/19 with severe sepsis and MICHELLE. Medically cleared to discharge to  TCU.     Intervention:  Pt waiting for bed at  TCU. Per admissions liaison there is not a bed available for pt today.     Assessment:  Awaiting bed at  TCU     Plan:    Anticipated Disposition:  Facility:   TCU    Barriers to d/c plan:  Bed availability    Follow Up:  sw following     AARON Butt, MARC  5A Unit   Pager [phone]  Phone [phone]

## 2019-03-20 NOTE — PLAN OF CARE
2658-3782: Pt admitted on 3/6 with sepsis, hypotension, and MICHELLE.  Pt A&O, VSS on RA, and up with A2.  No complaints of pain, nausea, or vomiting overnight.  Slept on and off.  Tolerating diet, reports good appetite.  Voiding WNL in bedside urinal.  No BM overnight.  Lymphedema wraps on BLE.  Wounds on toes covered.  PIV saline locked.  Calls to make needs known.  Plan to discharge to  TCU possibly 3/20.  Will continue to monitor

## 2019-03-21 ENCOUNTER — APPOINTMENT (OUTPATIENT)
Dept: PHYSICAL THERAPY | Facility: CLINIC | Age: 69
DRG: 856 | End: 2019-03-21
Payer: COMMERCIAL

## 2019-03-21 ENCOUNTER — APPOINTMENT (OUTPATIENT)
Dept: OCCUPATIONAL THERAPY | Facility: CLINIC | Age: 69
DRG: 856 | End: 2019-03-21
Payer: COMMERCIAL

## 2019-03-21 LAB
ANION GAP SERPL CALCULATED.3IONS-SCNC: 8 MMOL/L (ref 3–14)
BUN SERPL-MCNC: 24 MG/DL (ref 7–30)
CALCIUM SERPL-MCNC: 8.8 MG/DL (ref 8.5–10.1)
CHLORIDE SERPL-SCNC: 104 MMOL/L (ref 94–109)
CO2 SERPL-SCNC: 28 MMOL/L (ref 20–32)
CREAT SERPL-MCNC: 0.91 MG/DL (ref 0.66–1.25)
GFR SERPL CREATININE-BSD FRML MDRD: 86 ML/MIN/{1.73_M2}
GLUCOSE BLDC GLUCOMTR-MCNC: 101 MG/DL (ref 70–99)
GLUCOSE BLDC GLUCOMTR-MCNC: 106 MG/DL (ref 70–99)
GLUCOSE BLDC GLUCOMTR-MCNC: 111 MG/DL (ref 70–99)
GLUCOSE BLDC GLUCOMTR-MCNC: 117 MG/DL (ref 70–99)
GLUCOSE BLDC GLUCOMTR-MCNC: 129 MG/DL (ref 70–99)
GLUCOSE SERPL-MCNC: 130 MG/DL (ref 70–99)
INR PPP: 2.13 (ref 0.86–1.14)
MAGNESIUM SERPL-MCNC: 2.1 MG/DL (ref 1.6–2.3)
POTASSIUM SERPL-SCNC: 3.4 MMOL/L (ref 3.4–5.3)
SODIUM SERPL-SCNC: 140 MMOL/L (ref 133–144)

## 2019-03-21 PROCEDURE — 25000132 ZZH RX MED GY IP 250 OP 250 PS 637

## 2019-03-21 PROCEDURE — 00000146 ZZHCL STATISTIC GLUCOSE BY METER IP

## 2019-03-21 PROCEDURE — 25000132 ZZH RX MED GY IP 250 OP 250 PS 637: Performed by: STUDENT IN AN ORGANIZED HEALTH CARE EDUCATION/TRAINING PROGRAM

## 2019-03-21 PROCEDURE — 97110 THERAPEUTIC EXERCISES: CPT | Mod: GP

## 2019-03-21 PROCEDURE — 97530 THERAPEUTIC ACTIVITIES: CPT | Mod: GP

## 2019-03-21 PROCEDURE — 99232 SBSQ HOSP IP/OBS MODERATE 35: CPT | Mod: GC | Performed by: INTERNAL MEDICINE

## 2019-03-21 PROCEDURE — 36415 COLL VENOUS BLD VENIPUNCTURE: CPT | Performed by: STUDENT IN AN ORGANIZED HEALTH CARE EDUCATION/TRAINING PROGRAM

## 2019-03-21 PROCEDURE — 97535 SELF CARE MNGMENT TRAINING: CPT | Mod: GO | Performed by: OCCUPATIONAL THERAPIST

## 2019-03-21 PROCEDURE — 12000001 ZZH R&B MED SURG/OB UMMC

## 2019-03-21 PROCEDURE — 80048 BASIC METABOLIC PNL TOTAL CA: CPT | Performed by: STUDENT IN AN ORGANIZED HEALTH CARE EDUCATION/TRAINING PROGRAM

## 2019-03-21 PROCEDURE — 85610 PROTHROMBIN TIME: CPT | Performed by: STUDENT IN AN ORGANIZED HEALTH CARE EDUCATION/TRAINING PROGRAM

## 2019-03-21 PROCEDURE — 83735 ASSAY OF MAGNESIUM: CPT | Performed by: STUDENT IN AN ORGANIZED HEALTH CARE EDUCATION/TRAINING PROGRAM

## 2019-03-21 PROCEDURE — 25000132 ZZH RX MED GY IP 250 OP 250 PS 637: Performed by: INTERNAL MEDICINE

## 2019-03-21 PROCEDURE — 97116 GAIT TRAINING THERAPY: CPT | Mod: GP

## 2019-03-21 RX ORDER — WARFARIN SODIUM 10 MG/1
10 TABLET ORAL ONCE
Qty: 1 TABLET | Refills: 0 | Status: ON HOLD | DISCHARGE
Start: 2019-03-21 | End: 2019-04-02

## 2019-03-21 RX ORDER — WARFARIN SODIUM 10 MG/1
10 TABLET ORAL
Status: COMPLETED | OUTPATIENT
Start: 2019-03-21 | End: 2019-03-21

## 2019-03-21 RX ADMIN — TAMSULOSIN HYDROCHLORIDE 0.4 MG: 0.4 CAPSULE ORAL at 07:41

## 2019-03-21 RX ADMIN — Medication 75 MG: at 07:41

## 2019-03-21 RX ADMIN — POTASSIUM CHLORIDE 40 MEQ: 750 TABLET, EXTENDED RELEASE ORAL at 19:58

## 2019-03-21 RX ADMIN — WARFARIN SODIUM 10 MG: 10 TABLET ORAL at 17:48

## 2019-03-21 RX ADMIN — Medication 75 MG: at 15:53

## 2019-03-21 RX ADMIN — ASPIRIN 81 MG: 81 TABLET, COATED ORAL at 07:41

## 2019-03-21 RX ADMIN — LEVOTHYROXINE SODIUM 175 MCG: 25 TABLET ORAL at 07:41

## 2019-03-21 RX ADMIN — CHLORHEXIDINE GLUCONATE 15 ML: 1.2 RINSE ORAL at 19:58

## 2019-03-21 RX ADMIN — METOPROLOL SUCCINATE 100 MG: 50 TABLET, EXTENDED RELEASE ORAL at 07:41

## 2019-03-21 RX ADMIN — ATORVASTATIN CALCIUM 40 MG: 40 TABLET, FILM COATED ORAL at 19:57

## 2019-03-21 RX ADMIN — POTASSIUM CHLORIDE 40 MEQ: 750 TABLET, EXTENDED RELEASE ORAL at 07:41

## 2019-03-21 RX ADMIN — CHLORHEXIDINE GLUCONATE 15 ML: 1.2 RINSE ORAL at 07:41

## 2019-03-21 ASSESSMENT — ACTIVITIES OF DAILY LIVING (ADL)
ADLS_ACUITY_SCORE: 19

## 2019-03-21 NOTE — PLAN OF CARE
2992-6893: Pt A&O, VSS on 2L NC overnight, destating to mid 80s on RA, and up with A1-2+walker.  Pt has no complaints overnight.  Slept well.  Voiding WNL, no BM.  Lymphedema wraps in place.  Declined full skin assessment.  Will discharge to [facility]CU when bed available, possibly 3-21.  Will continue to monitor

## 2019-03-21 NOTE — PLAN OF CARE
PT 5A: Up with Ax1    Discharge Planner PT   Patient plan for discharge: TCU  Current status: Engaged pt in supine > EOB at MOD I, sit <> stand at supervision with FWW, gait with FWW for ~150ft x 2 bouts at SBA with WC follow for safety (seated rest break between bouts), standing B LE strengthening exercises, and EOB > supine with mod A at B LE. Pt on RA for all activity with sats >92% and HR 100s per pulse ox.   Barriers to return to prior living situation: medical status, lives alone, home set up, edema  Recommendations for discharge: TCU  Rationale for recommendations: Pt has been progressing well and tolerating therapy. Pt will continue to benefit from rehab to improve strength, balance, and activity tolerance to return to community at [facility]. Pt would be unsafe to return home at this time due to increased assist required for ADLs, bed mobility, impaired dynamic balance (increasing fall risk), and impaired endurance limiting gait distance in community.        Entered by: Elizabeth De La Fuente 03/21/2019 3:34 PM

## 2019-03-21 NOTE — PLAN OF CARE
Discharge Planner OT 5A  Patient plan for discharge: TCU  Current status: Needs assist for functional mobility and ADL's.   Barriers to return to prior living situation: decreased mobility, edema, need for assist.  Recommendations for discharge: TCU  Rationale for recommendations: Pt will benefit from OT to work on increasing level of independence with functional mobility and ADL's.       Entered by: Any Cruz 03/21/2019 2:38 PM

## 2019-03-21 NOTE — PLAN OF CARE
Pt is alert, oriented and very pleasant. Denies pain. Voiding large amounts without issue. Reports BM yesterday. Pt refused skin assessment of his back/ татьяна area and toes this shift, but reports they are well taken care of. Pt ate well. Eager to discharge to  TCU.

## 2019-03-21 NOTE — PLAN OF CARE
700-1500     VSS on RA. A/Ox4. Denies pain/nausea. Up with A1+W. PIV SL. Regular diet, good appetite. Voiding WDL. No BM this shift. Wounds on jen big toes CDI dressings changed. Plan to discharge to TCU as soon as bed available. Per SW bed will be ready tomorrow morning and ride set for 10:30am. Continue to monitor and follow POC.

## 2019-03-22 ENCOUNTER — APPOINTMENT (OUTPATIENT)
Dept: OCCUPATIONAL THERAPY | Facility: CLINIC | Age: 69
DRG: 856 | End: 2019-03-22
Payer: COMMERCIAL

## 2019-03-22 ENCOUNTER — HOSPITAL ENCOUNTER (INPATIENT)
Facility: SKILLED NURSING FACILITY | Age: 69
LOS: 12 days | Discharge: HOME HEALTH | DRG: 948 | End: 2019-04-03
Attending: INTERNAL MEDICINE | Admitting: INTERNAL MEDICINE
Payer: COMMERCIAL

## 2019-03-22 VITALS
WEIGHT: 315 LBS | HEART RATE: 88 BPM | TEMPERATURE: 96.2 F | BODY MASS INDEX: 44.1 KG/M2 | OXYGEN SATURATION: 97 % | SYSTOLIC BLOOD PRESSURE: 113 MMHG | HEIGHT: 71 IN | RESPIRATION RATE: 20 BRPM | DIASTOLIC BLOOD PRESSURE: 71 MMHG

## 2019-03-22 DIAGNOSIS — Z78.9 ADEQUATE NUTRITION: ICD-10-CM

## 2019-03-22 DIAGNOSIS — R33.9 URINARY RETENTION: ICD-10-CM

## 2019-03-22 DIAGNOSIS — I48.19 PERSISTENT ATRIAL FIBRILLATION (H): ICD-10-CM

## 2019-03-22 DIAGNOSIS — I48.20 CHRONIC ATRIAL FIBRILLATION (H): ICD-10-CM

## 2019-03-22 DIAGNOSIS — I48.0 PAROXYSMAL ATRIAL FIBRILLATION (H): ICD-10-CM

## 2019-03-22 DIAGNOSIS — I73.9 PAD (PERIPHERAL ARTERY DISEASE) (H): ICD-10-CM

## 2019-03-22 DIAGNOSIS — I50.22 CHRONIC SYSTOLIC CONGESTIVE HEART FAILURE (H): ICD-10-CM

## 2019-03-22 DIAGNOSIS — I50.30 (HFPEF) HEART FAILURE WITH PRESERVED EJECTION FRACTION (H): Primary | ICD-10-CM

## 2019-03-22 LAB
GLUCOSE BLDC GLUCOMTR-MCNC: 106 MG/DL (ref 70–99)
GLUCOSE BLDC GLUCOMTR-MCNC: 108 MG/DL (ref 70–99)
GLUCOSE BLDC GLUCOMTR-MCNC: 112 MG/DL (ref 70–99)
GLUCOSE BLDC GLUCOMTR-MCNC: 127 MG/DL (ref 70–99)
INR PPP: 2.12 (ref 0.86–1.14)

## 2019-03-22 PROCEDURE — 25000132 ZZH RX MED GY IP 250 OP 250 PS 637: Performed by: PHYSICIAN ASSISTANT

## 2019-03-22 PROCEDURE — 99238 HOSP IP/OBS DSCHRG MGMT 30/<: CPT | Mod: GC | Performed by: INTERNAL MEDICINE

## 2019-03-22 PROCEDURE — 85610 PROTHROMBIN TIME: CPT | Performed by: STUDENT IN AN ORGANIZED HEALTH CARE EDUCATION/TRAINING PROGRAM

## 2019-03-22 PROCEDURE — 25000132 ZZH RX MED GY IP 250 OP 250 PS 637: Performed by: STUDENT IN AN ORGANIZED HEALTH CARE EDUCATION/TRAINING PROGRAM

## 2019-03-22 PROCEDURE — 00000146 ZZHCL STATISTIC GLUCOSE BY METER IP

## 2019-03-22 PROCEDURE — 25000132 ZZH RX MED GY IP 250 OP 250 PS 637

## 2019-03-22 PROCEDURE — 36415 COLL VENOUS BLD VENIPUNCTURE: CPT | Performed by: STUDENT IN AN ORGANIZED HEALTH CARE EDUCATION/TRAINING PROGRAM

## 2019-03-22 PROCEDURE — 99309 SBSQ NF CARE MODERATE MDM 30: CPT | Performed by: PHYSICIAN ASSISTANT

## 2019-03-22 PROCEDURE — 12000022 ZZH R&B SNF

## 2019-03-22 PROCEDURE — 97140 MANUAL THERAPY 1/> REGIONS: CPT | Mod: GO | Performed by: OCCUPATIONAL THERAPIST

## 2019-03-22 PROCEDURE — 99207 ZZC CDG-MDM COMPONENT: MEETS LOW - DOWN CODED: CPT | Performed by: PHYSICIAN ASSISTANT

## 2019-03-22 PROCEDURE — 25000132 ZZH RX MED GY IP 250 OP 250 PS 637: Performed by: INTERNAL MEDICINE

## 2019-03-22 RX ORDER — MULTIVIT WITH MINERALS/LUTEIN
1000 TABLET ORAL DAILY
Status: DISCONTINUED | OUTPATIENT
Start: 2019-03-22 | End: 2019-03-23

## 2019-03-22 RX ORDER — ACETAMINOPHEN 325 MG/1
650 TABLET ORAL EVERY 4 HOURS PRN
Status: DISCONTINUED | OUTPATIENT
Start: 2019-03-22 | End: 2019-04-03 | Stop reason: HOSPADM

## 2019-03-22 RX ORDER — LEVOTHYROXINE SODIUM 175 UG/1
175 TABLET ORAL
Status: DISCONTINUED | OUTPATIENT
Start: 2019-03-23 | End: 2019-03-23

## 2019-03-22 RX ORDER — CALCIUM CARBONATE 500 MG/1
1000 TABLET, CHEWABLE ORAL 4 TIMES DAILY PRN
Status: DISCONTINUED | OUTPATIENT
Start: 2019-03-22 | End: 2019-04-03 | Stop reason: HOSPADM

## 2019-03-22 RX ORDER — ASPIRIN 81 MG/1
81 TABLET ORAL DAILY
Status: DISCONTINUED | OUTPATIENT
Start: 2019-03-23 | End: 2019-03-23

## 2019-03-22 RX ORDER — MULTIPLE VITAMINS W/ MINERALS TAB 9MG-400MCG
1 TAB ORAL DAILY
Status: DISCONTINUED | OUTPATIENT
Start: 2019-03-23 | End: 2019-03-23

## 2019-03-22 RX ORDER — NYSTATIN 100000 U/G
CREAM TOPICAL 2 TIMES DAILY PRN
Status: DISCONTINUED | OUTPATIENT
Start: 2019-03-22 | End: 2019-03-22

## 2019-03-22 RX ORDER — METOPROLOL SUCCINATE 100 MG/1
100 TABLET, EXTENDED RELEASE ORAL DAILY
Status: DISCONTINUED | OUTPATIENT
Start: 2019-03-23 | End: 2019-03-23

## 2019-03-22 RX ORDER — AMMONIUM LACTATE 12 G/100G
CREAM TOPICAL 2 TIMES DAILY PRN
Status: DISCONTINUED | OUTPATIENT
Start: 2019-03-22 | End: 2019-04-03 | Stop reason: HOSPADM

## 2019-03-22 RX ORDER — POTASSIUM CHLORIDE 750 MG/1
40 TABLET, EXTENDED RELEASE ORAL 2 TIMES DAILY
Status: DISCONTINUED | OUTPATIENT
Start: 2019-03-22 | End: 2019-03-23

## 2019-03-22 RX ORDER — TAMSULOSIN HYDROCHLORIDE 0.4 MG/1
0.4 CAPSULE ORAL DAILY
Status: DISCONTINUED | OUTPATIENT
Start: 2019-03-23 | End: 2019-03-23

## 2019-03-22 RX ORDER — POLYETHYLENE GLYCOL 3350 17 G/17G
17 POWDER, FOR SOLUTION ORAL DAILY PRN
Status: DISCONTINUED | OUTPATIENT
Start: 2019-03-22 | End: 2019-04-03 | Stop reason: HOSPADM

## 2019-03-22 RX ORDER — ATORVASTATIN CALCIUM 20 MG/1
40 TABLET, FILM COATED ORAL EVERY EVENING
Status: DISCONTINUED | OUTPATIENT
Start: 2019-03-22 | End: 2019-03-23

## 2019-03-22 RX ORDER — ACETAMINOPHEN 650 MG/1
650 SUPPOSITORY RECTAL EVERY 4 HOURS PRN
Status: DISCONTINUED | OUTPATIENT
Start: 2019-03-22 | End: 2019-04-03 | Stop reason: HOSPADM

## 2019-03-22 RX ORDER — ASCORBIC ACID 500 MG
500 TABLET ORAL DAILY
Status: DISCONTINUED | OUTPATIENT
Start: 2019-03-23 | End: 2019-03-23

## 2019-03-22 RX ORDER — WARFARIN SODIUM 5 MG/1
10 TABLET ORAL
Status: COMPLETED | OUTPATIENT
Start: 2019-03-22 | End: 2019-03-22

## 2019-03-22 RX ORDER — AMOXICILLIN 250 MG
1-2 CAPSULE ORAL 2 TIMES DAILY PRN
Status: DISCONTINUED | OUTPATIENT
Start: 2019-03-22 | End: 2019-04-03 | Stop reason: HOSPADM

## 2019-03-22 RX ORDER — CHLORAL HYDRATE 500 MG
1 CAPSULE ORAL DAILY
Status: DISCONTINUED | OUTPATIENT
Start: 2019-03-23 | End: 2019-03-23

## 2019-03-22 RX ADMIN — B-COMPLEX W/ C & FOLIC ACID TAB 1 TABLET: TAB at 13:13

## 2019-03-22 RX ADMIN — ASPIRIN 81 MG: 81 TABLET, COATED ORAL at 07:45

## 2019-03-22 RX ADMIN — METOPROLOL SUCCINATE 100 MG: 50 TABLET, EXTENDED RELEASE ORAL at 07:45

## 2019-03-22 RX ADMIN — ATORVASTATIN CALCIUM 40 MG: 20 TABLET, FILM COATED ORAL at 20:31

## 2019-03-22 RX ADMIN — TAMSULOSIN HYDROCHLORIDE 0.4 MG: 0.4 CAPSULE ORAL at 07:46

## 2019-03-22 RX ADMIN — CHLORHEXIDINE GLUCONATE 15 ML: 1.2 RINSE ORAL at 07:46

## 2019-03-22 RX ADMIN — WARFARIN SODIUM 10 MG: 5 TABLET ORAL at 17:45

## 2019-03-22 RX ADMIN — POTASSIUM CHLORIDE 40 MEQ: 750 TABLET, EXTENDED RELEASE ORAL at 07:46

## 2019-03-22 RX ADMIN — METFORMIN HYDROCHLORIDE 500 MG: 500 TABLET ORAL at 17:45

## 2019-03-22 RX ADMIN — Medication 1000 UNITS: at 13:13

## 2019-03-22 RX ADMIN — POTASSIUM CHLORIDE 40 MEQ: 750 TABLET, EXTENDED RELEASE ORAL at 20:31

## 2019-03-22 RX ADMIN — LEVOTHYROXINE SODIUM 175 MCG: 25 TABLET ORAL at 07:46

## 2019-03-22 RX ADMIN — TORSEMIDE 75 MG: 20 TABLET ORAL at 18:04

## 2019-03-22 RX ADMIN — Medication 75 MG: at 07:45

## 2019-03-22 ASSESSMENT — ACTIVITIES OF DAILY LIVING (ADL)
ADLS_ACUITY_SCORE: 19

## 2019-03-22 ASSESSMENT — MIFFLIN-ST. JEOR: SCORE: 2480.87

## 2019-03-22 NOTE — PROGRESS NOTES
Focus:  Wound management  D:  Disucssed consult with charge nurse and RN.  Patient is usually followed by podiatry and charge RN called that service to obtain orders for are of toe wound.  He also has a follow up appointment with podiatry Wednesday this week   P:  WOC Service will sign off unless podiatry wants our input

## 2019-03-22 NOTE — PLAN OF CARE
Occupational Therapy Discharge Summary    Reason for therapy discharge:    Discharged to transitional care facility.    Progress towards therapy goal(s). See goals on Care Plan in [facility] electronic health record for goal details.  Goals partially met.  Barriers to achieving goals:   discharge from facility.    Therapy recommendation(s):    Continued therapy is recommended.  Rationale/Recommendations:  pt below baseline in ADLs, recommend TCU to facilitate return to PLOF.

## 2019-03-22 NOTE — LETTER
"ATTN: Alisha FRANCOIS - [facility]    Clarke Moreira  1950  AUTH# [id number]    Please see attached PT/OT daily documentation with session minutes.  Clinical updates will be faxed separately.    Please call or email with any additional questions or requests.     Sincerely,  Naida Quijano MSN, RN  MDS Coordinator - [facility]  PH: [phone]  FAX:   EMAIL: [email or web address]        PT DAILY DOCUMENTATION     03/23/19 0929   Signing Clinician's Name / Credentials   Signing clinician's name / credentials Michelle Gallagher PT   Quick Adds   Rehab Discipline PT   Therapeutic Exercise   Symptoms Noted During/After Treatment fatigue   Treatment Detail PT: educated pt in LE ex to mobilize fluid; x 10 B: AP, QS, GS, Pt reports keeps LE elevated as much as possible at home. Instructed in seated ex for ROM/strength x 10 B: AP, marching, LAQ, abduction step outs. Technique cues. Tolerated well   Additional Documentation   Rehab Comments PT: eval completed, rx initiated   PT Plan PT: progress gait distance; LE ther ex--seated; bed mobility--work on getting LE in/out   Total Session Time   Total Session Time (minutes) 45 minutes  (15 eval; 20 TE; 10 gait)   ARC or TCU Only   What unit is patient on? TCU   Bed Mobility: Turning side to side/Roll Left and Right   Patient Performance Independent   Staff Performance No setup or physical help (No setup or physical help from staff)   Describe Performance PT: moved very slowly and with effort but able to roll IND to R side   Bed Mobility: Sit to lying   Patient Performance Partial/moderate assist \"includes weight bearing assist of trunk or limbs\"   Staff Performance One person raegan (one person physical assist)   Describe Performance PT:  min A for LE sit>sup   Bed Mobility: Lying to sitting on the side of bed   Patient Performance Supervision or verbal cues   Staff Performance Set up help only   Describe Performance PT: SBA sup>sit   Transfers: Sit to Stand   Patient " Performance Supervision or verbal cues   Staff Performance Set up help only   Equipment Used Rolling walker   Describe Performance PT: SBA sit<>stand from bed   Transfers: Chair/Bed transfers   Patient Performance Supervision or verbal cues   Staff Performance Set up help only   Equipment Used Rolling walker   Describe Performance PT: pivot with ww SBA   Ambulation   Walks in room:Patient Performance Supervision or verbal cues   Walks in room: Staff Performance Set up help only   Walk in ahuja: Patient Performance Supervision or verbal cues   Walks in ahuja: Staff Performance Set up help only   Mobility device used rolling walker   Describe Performance PT: amb with ww 130 ft; SBA; widened stance with decreased step length. Protective footwear on.. Too fatigued for second walk   Walk 10 Feet   Patient Performance Supervision or verbal cues   Describe Performance PT: amb with ww 130 ft; SBA; widened stance with decreased step length. Protective footwear on.. Too fatigued for second walk   Walk 10 Feet on uneven surfaces   Reason Not Done Not attempted due to environmental limitations (e.g., lack of equipment,weather constraints)   Walk 50 Feet with Two Turns   Patient Performance Supervision or verbal cues   Describe Performance PT: amb with ww 130 ft; SBA; widened stance with decreased step length. Protective footwear on.. Too fatigued for second walk   Walk 150 Feet   Reason Not Done Safety concerns   Locomotion   Move on unit: Patient Performance Supervision or verbal cues   Move on unit: Staff Performance Set up help only   Mobility device used rolling walker   Describe Performance PT: amb with ww 130 ft; SBA; widened stance with decreased step length. Protective footwear on.. Too fatigued for second walk   Wheel 50 Feet   Reason Not Done Activity not applicable   Wheel 150 Feet   Reason Not Done Activity not applicable   1 Step (curb)   Reason Not Done Safety concerns   4 Steps   Reason Not Done Safety concerns    12 Steps   Reason Not Done Safety concerns   Car Transfer   Reason Not Done Not attempted due to environmental limitations (e.g., lack of equipment,weather constraints)   Picking up Object   Reason Not Done Safety concerns      03/25/19 1500   Signing Clinician's Name / Credentials   Signing clinician's name / credentials CHUCKY Reyna Adds   Rehab Discipline PT   Therapeutic Activity   Minutes of Treatment 10 minutes   Treatment Detail PT: Vitals stable pre and post activity, left seated EOB, needs in reach.    Therapeutic Exercise   Minutes of Treatment 35   Symptoms Noted During/After Treatment fatigue   Treatment Detail PT: To progress functional strength and promote fluid circulation, pt participated and provided with seated LE HEP handout, BLEs: marching x30, SAQ w/ 2# ankle weights x15, green theraband HS curls x10, hip abd w/ green theraband x10, ankle pumps x15. Cues in form and hold times. Pt needs rests between each set.    Additional Documentation   PT Plan PT: gait distance, Nustep, STS, LE strengthening   Total Session Time   Total Session Time (minutes) 60 minutes  (10 TA, 35 TE, 15 G)   ARC or TCU Only   What unit is patient on? TCU   Bed Mobility: Turning side to side/Roll Left and Right   Patient Performance Supervision or verbal cues   Staff Performance Set up help only   Transfers: Sit to Stand   Patient Performance Supervision or verbal cues   Staff Performance Set up help only   Equipment Used Rolling walker   Describe Performance PT: STS x5 from mat, cues to limit UEs for strengthening   Ambulation   Walks in room:Patient Performance Steadying assist   Walks in room: Staff Performance One person assist (one person physical assist)   Walk in ahuja: Patient Performance Steadying assist   Walks in ahuja: Staff Performance One person assist (one person physical assist)   Mobility device used standard walker   Describe Performance PT: amb 150' x2 w/ FWW, CGA. Steady, increased time  and fatigue noted with SOB.    Locomotion   Move on unit: Patient Performance Steadying assist   Move on unit: Staff Performance One person assist (one person physical assist)   Move off unit: Patient Performance Supervision or verbal cues   Move off unit: Staff Performance One person assist (one person physical assist)   Describe Performance PT: amb 150' x2 w/ FWW, CGA. Steady, increased time and fatigue noted with SOB.       03/26/19 0930   Signing Clinician's Name / Credentials   Signing clinician's name / credentials Arielle Reese DPT   Quick Adds   Rehab Discipline PT   Therapeutic Activity   Minutes of Treatment 15 minutes   Treatment Detail PT: Pt's vitals stable throughout session. HR variable d/t afib  but ~100bpm throughout session. Pt left EOB. Educated on POC.    Therapeutic Exercise   Minutes of Treatment 30   Symptoms Noted During/After Treatment fatigue   Treatment Detail PT: Pt educated on continued performance of seated LE exercise HEP. Pt verbally educated on supine exercises to perform.   Additional Documentation   Rehab Comments PT: Pt has seated HEP that he is completing IND, would like supine handout   PT Plan PT: gait distance, STS, Nustep, supine LE exercises and issue HEP to IND performance   Total Session Time   Total Session Time (minutes) 45 minutes  (15 TA, 30 TE)   ARC or TCU Only   What unit is patient on? TCU   Bed Mobility: Turning side to side/Roll Left and Right   Patient Performance Independent   Staff Performance No setup or physical help (No setup or physical help from staff)   Bed Mobility: Lying to sitting on the side of bed   Patient Performance Supervision or verbal cues   Staff Performance Set up help only   Describe Performance PT: HOB flat, bed rail and SBA w/ log roll   Transfers: Sit to Stand   Patient Performance Supervision or verbal cues   Staff Performance Set up help only   Equipment Used Standard walker   Describe Performance PT: SBA to FWW   Ambulation   Walks  in room:Patient Performance Supervision or verbal cues   Walks in room: Staff Performance Set up help only   Walk in ahuja: Patient Performance Supervision or verbal cues   Walks in ahuja: Staff Performance Set up help only   Mobility device used standard walker   Describe Performance PT: 500'x 1 w/ FWW, standing rest breaks x4 for ~1-2min. Pt w/ SOB, improves in upright posture and decreased UE use by end of bout.    Locomotion   Move on unit: Patient Performance Supervision or verbal cues   Move on unit: Staff Performance Set up help only   Describe Performance PT: 500'x 1 w/ FWW, standing rest breaks x4 for ~1-2min. Pt w/ SOB, improves in upright posture and decreased UE use by end of bout.       03/27/19 1401   Signing Clinician's Name / Credentials   Signing clinician's name / credentials Dina Reinoso, PT   Quick Adds   Rehab Discipline PT   Therapeutic Exercise   Symptoms Noted During/After Treatment fatigue   Treatment Detail PT; reviewed seated aps, hip abd/ add priot to amb. x 5 BLEs.   Amb for endurance, strenghtening in ahuja with fww, needs standing rests at times due to strain on shoulders, slight SAWANT.  x 200 ft total, 160 ft x 1 omni 8/10 at end somne L ankle soreness after nustep. slow pace,  Nustep level 2, B LEs, UEs, A to set  up for LEs on pedals. nustep level 1-3, 7 min 30 seconds total. omni 5/10.    Additional Documentation   PT Plan PT; cont to work on gait dist with fww, sup therex HEP.    Total Session Time   Total Session Time (minutes) 35 minutes  (therex 30, theract 5)   ARC or TCU Only   What unit is patient on? TCU   Bed Mobility: Lying to sitting on the side of bed   Describe Performance PT; sba, effortful.    Transfers: Sit to Stand   Equipment Used Rolling walker   Describe Performance PT: sba, effortful form bed, chair.   cga from nustep.    Ambulation   Mobility device used rolling walker   Describe Performance PT see therex.       03/28/19 1135   Signing Clinician's Name /  "Credentials   Signing clinician's name / credentials Amanda Navarro, SPTA   Quick Adds   Rehab Discipline PT   PT Assistant Visit Number 1   Therapeutic Exercise   Treatment Detail NuStep L2-4-3 x 11\" 84 steps/min, OMNI 7. Seated LE exercises including HS, GS, knee ext, marches, and heel-toe raises x 10 B SBA   Supervision   Supervision Direct supervision provided   Additional Documentation   Rehab Comments Pt reported feeling more fatigued than normal at start of session.   PT Plan balance ex, increasing gait distance   Total Session Time   Total Session Time (minutes) 55 minutes  (ther ex: 55)   Transfers: Sit to Stand   Patient Performance Supervision or verbal cues   Staff Performance No set up or physical help (No set up or physical help from staff)   Equipment Used Rolling walker   Describe Performance SPTA: Pt performed STS <> bed x 1, from mat x 1 SBA d/t fatigue.    Transfers: Chair/Bed transfers   Reason Not Done Activity not applicable   Ambulation   Walks in room:Patient Performance Supervision or verbal cues   Walks in room: Staff Performance No set up or physical help ( No set up or physical help from staff)   Walk in ahuja: Patient Performance Supervision or verbal cues   Walks in ahuja: Staff Performance No set up or physical help ( No set up or physical help from staff)   Mobility device used rolling walker   Describe Performance SPTA: Pt ambulated 168' x 2 c FWW and step to pattern, SBA with 1 rest break each time   Walk 10 Feet   Patient Performance Supervision or verbal cues   Describe Performance SPTA: Pt ambulated 168' x 2 c FWW and step to pattern, SBA with 1 rest break each time   Walk 10 Feet on uneven surfaces   Patient Performance Supervision or verbal cues   Describe Performance SPTA: Pt ambulated 168' x 2 c FWW and step to pattern, SBA with 1 rest break each time. Pt able to ambulate over change in carpeting with slowed pace.   Walk 50 Feet with Two Turns   Patient Performance Supervision " or verbal cues   Describe Performance SPTA: Pt ambulated 168' x 2 c FWW and step to pattern, SBA with 1 rest break each time   Walk 150 Feet   Patient Performance Supervision or verbal cues   Describe Performance SPTA: Pt ambulated 168' x 2 c FWW and step to pattern, SBA with 1 rest break each time.   Locomotion   Move on unit: Patient Performance Supervision or verbal cues   Move on unit: Staff Performance No set up or physical help ( No set up or physical help from staff)   Move off unit: Patient Performance Supervision or verbal cues   Move off unit: Staff Performance No set up or physical help ( No set up or physical help from staff)   Mobility device used rolling walker   Describe Performance SPTA: Pt ambulated 168' x 2 c FWW and step to pattern, SBA with 1 rest break each time     OT DAILY DOCUMENTATION     03/23/19 1600   Signing Clinician's Name / Credentials   Signing clinician's name / credentials reji zheng OTRL   Quick Adds   Rehab Discipline OT   ADL Training   Minutes of Treatment 45   Symptoms Noted During/After Treatment fatigue   Treatment ADL training within precautions;Tasks graded to facilitate independence   Treatment Detail OT bed mobility, discussed home set up and energy strategy, clothing suggestions , POC and OT goals with patient in agreement.    Patient Response Able to follow techniques as instructed   Additional Documentation   Rehab Comments psych hystory, can demonstrate extreme varriability druring FX activity    OT Plan OT toileting independence and strategy as needed, UE LE body washing at sink simulated to home set up full body dressing including footwear may need AE   Total Session Time   Total Session Time (minutes) 60 minutes  (OT 15 eval 45 ADL)   ARC or TCU Only   What unit is patient on? TCU   Bed Mobility: Turning side to side/Roll Left and Right   Patient Performance Supervision or verbal cues   Staff Performance Set up help only   Describe Performance OT SBA set up  "bed rail verbal encouragement   Bed Mobility: Sit to lying   Patient Performance Partial/moderate assist \"includes weight bearing assist of trunk or limbs\"   Staff Performance One person raegan (one person physical assist)   Describe Performance OT min assist verbal cue set up bed rail   Bed Mobility: Lying to sitting on the side of bed   Patient Performance Supervision or verbal cues   Staff Performance Set up help only   Describe Performance OT SBA bed rail verbal cue verbal encouragement longer than usual time frame needed   Upper Body Dressing   Patient Performance Partial/moderate assist \"includes weight bearing assist of trunk or limbs\"   Staff Performance One person assist (one person physical assist)   Describe Performance OT moderate assist set up    Lower Body Dressing (Putting On/Taking-Off Footwear)   Patient Performance Total dependence (helper does ALL of the effort)   Staff Performance One person assist (one person physical assist)      03/25/19 0829   Signing Clinician's Name / Credentials   Signing clinician's name / credentials Laurel Cahalan,OTR   Quick Adds   Rehab Discipline OT   Therapeutic Exercise   Minutes of Treatment 20   Treatment Detail Focus on UB strength using 2?# dowel. Pt completed 15 reps x2 of bicep. 15 reps x2 of shoulder shrugs and blade squeezes and 10 reps x2 of shoulder exercises. Pt completed exercises and tolerated well.    ADL Training   Minutes of Treatment 40   Treatment Detail ADL standing at the sink. Pt c/o left ankle pain. Issued reacher for pt to use. Pt's clthing were wet so he could not don.    Additional Documentation   OT Plan OT toileting independence and strategy as needed, UE LE body washing at sink simulated to home set up full body dressing including footwear may need AE   Total Session Time   Total Session Time (minutes) 60 minutes   ARC or TCU Only   What unit is patient on? TCU   Bed Mobility: Lying to sitting on the side of bed   Describe Performance slow " but no physical assist needed   Lower Body Dressing (Putting On/Taking-Off Footwear)   Describe Performance set up for velcro shoes   Hygiene/Grooming   Describe Performance SBA standing   Oral Hygiene   Describe Performance SBA standing      03/26/19 1344   Signing Clinician's Name / Credentials   Signing clinician's name / credentials Any Cruz OTR/L   Quick Adds   Rehab Discipline OT   Therapeutic Activity   Symptoms Noted During/After Treatment Fatigue   Treatment Detail OT-Intervention focused on completion of bed mobility for supine to sit and sit to stand. Pt. completed supine to sit with SBA which is improved as compared to prior status. Pt. completed sit to stand with CGA with bed height elevated. Pt. ambulated with walker into bathroom with CGA.   Therapeutic Exercise   Symptoms Noted During/After Treatment fatigue   Treatment Detail OT-Pt. completed B UE exercises with use of 2# dowel. He completed exercises for shoulder flexion, shoulder horizontal ab/adduction, forward punches, elbow flexion, forward and reverse arm circles, and lateral arm circles. Pt. completed 10-15 reps of each exercise and tolerated well.    ADL Training   Symptoms Noted During/After Treatment fatigue   Treatment ADL training within precautions;Environment structured;Tasks graded to facilitate independence   Treatment Detail OT-Intervention focused on completion of toilet transfers to actual toilet. Pt. completed stand to sit transfer with min assist and use of grab bar. He completed sit to stand from toilet with use of grab bar and mod assist. He completed 2 trials for toilet transfers and sit to stand from toilet. Pt. stated the transfers today were improved from previous status and that he had increased control.    Additional Documentation   OT Plan OT-continue to focus on functional mobility for standing at sink, toilet transfers, LE dressing.    Total Session Time   Total Session Time (minutes) 53 minutes  (8' ther. act.,  "25' self care, 20' ther. exer.)   Bed Mobility: Turning side to side/Roll Left and Right   Patient Performance Supervision or verbal cues   Staff Performance Set up help only   Bed Mobility: Lying to sitting on the side of bed   Patient Performance Supervision or verbal cues   Staff Performance Set up help only   Transfers: Sit to Stand   Patient Performance Steadying Assist   Staff Performance One person assist (one person physical assist)   Transfers: Toilet transfers   Patient Performance Partial/moderate assist \"includes weight bearing assist of trunk or limbs\"   Staff Performance One person assist (one person physical assist)   Describe Performance OT-Pt. completed transfer to toilet 2x's with min assist for stand to sit and mod assist for sit to stand.       03/27/19 0700   Signing Clinician's Name / Credentials   Signing clinician's name / credentials MANUELA Griggs/L   Quick Adds   Rehab Discipline OT   Therapeutic Exercise   Minutes of Treatment 30   Symptoms Noted During/After Treatment fatigue   Treatment Detail OT: BUE exercises indicated to increase strength and endurance for functional transfers and ADL routine. 2 Lb dowel: sh flex/ext, chest press, overhead press, elbow flex/ext, sh horiz add/abd, forward/backward circles, and wrist flex/ext 2 sets x 10 reps. Tactile and verbal cues provided throughout for correct technique. Rest breaks throughout for energy conservation. Pt amb to/from therapy room ~150' SBA using fww to further reinforce functional endurance.   ADL Training   Minutes of Treatment 30   Symptoms Noted During/After Treatment fatigue   Treatment Adaptive equipment training;Energy conservation/work simplification   Treatment Detail OT: Facilitated ADL routine focus on IND with functional transfers, activity tolerance, and AE training. Reinforced use of reacher for LB dressing and retrieving self care items. Practiced functional transfers with emphasis on BLE strengthening. See below " for details of functional performance.   Patient Response Able to follow techniques as instructed   Additional Documentation   OT Plan OT: Functional transfers/mobility, ask about home measurements and practice STS from lower surfaces, BUE/core strengthening.   Total Session Time   Total Session Time (minutes) 60 minutes   Bed Mobility: Lying to sitting on the side of bed   Patient Performance Supervision or verbal cues   Staff Performance No set up or physical help (No set up or physical help from staff)   Describe Performance OT: Supine to EOB with HOB flat, some use of bedrail to come to fully upright position.   Transfers: Sit to Stand   Patient Performance Supervision or verbal cues   Staff Performance Set up help only   Equipment Used Rolling walker   Describe Performance OT: SBA using fww from elevated surface, some assist from lower surfaces.   Ambulation   Walks in room:Patient Performance Supervision or verbal cues   Walks in room: Staff Performance No set up or physical help ( No set up or physical help from staff)   Walk in ahuja: Patient Performance Supervision or verbal cues   Walks in ahuja: Staff Performance Set up help only   Describe Performance OT: SBA amb in room with fww. Pt amb 150' x 2 to/from therapy room SBA/CGA using fww, heavy UE support on fww. Pt feels that he's taking better steps than previous day.    Upper Body Dressing   Patient Performance Independent   Staff Performance No set up or physical help (No set up or physical help from staff)   Describe Performance OT: Refusing standard shirt. Pt [facility] gown IND while setaed.   Lower Body Dressing (Pants/Undergarments)   Reason Not Done Resident refused to perform   Describe Performance OT: Refused brief or pants.   Lower Body Dressing (Putting On/Taking-Off Footwear)   Patient Performance Supervision or verbal cues   Staff Performance Set up help only   Describe Performance OT: Pt requiring assist for bilateral wraps, socks  "already donned. Pt using reacher to don CAM boots, increased time and appeared effortful.    Transfers: Toilet transfers   Patient Performance Partial/moderate assist \"includes weight bearing assist of trunk or limbs\"   Staff Performance One person assist (one person physical assist)   Equipment Used Rolling walker   Describe Performance OT: Min A SPT to toilet with fww and grab bar, requiring lifting assist for STS from toilet height.   Hygiene/Grooming   Patient Performance Independent   Staff Performance No set up or physical help ( No set up or physical help from staff)   Describe Performance OT: IND standing g/h at EOS to wash/brush hair, wash hands/face. Pt tolerating several minutes of static standing, UE support on EOS as needed.   Oral Hygiene   Patient Performance Independent   Staff Performance No set up or physical help (No set up or physical help from staff)   Describe Performance OT: IND standing oral cares at EOS>   Bathing   Patient Performance Supervision or verbal cues   Bathing Transfer Assist No   Describe Performance OT: Partial sponge bath at EOS. Pt wash/dry BUE, underarms, face, hair, татьяна area, and gluteal region with supervision for balance. Tolerating several minutes static standing at EOS.                   "

## 2019-03-22 NOTE — PLAN OF CARE
"/71 (BP Location: Right arm)   Pulse 88   Temp 96.2  F (35.7  C) (Oral)   Resp 20   Ht 1.803 m (5' 11\")   Wt (!) 161.8 kg (356 lb 11.3 oz)   SpO2 97%   BMI 49.75 kg/m      Time:  7340-0965    Reason for admission: Sepsis  Neuro:  A&Ox4.  Clear and logical speech.  No aphasia.  PERRLA.  Behavior:  Calm and cooperative.  Calls appropriately.  Activity:  Up w/ Ax1 and walker.    Vitals: VSS on RA, except tachy and soft BPs.   Lines:  R PIV SL  Cardiac:  Tachy and soft BPs  Respiratory:  Dim LS.  Sats 97% on RA.    GI/:  Voiding.  No BM this shift.    Skin:  L and R toe wounds.  Daily dressing changes, CDI.    Endo:  KX=505  Pain:  Denies pain.  Diet:  ModCHO, tolerating.  Labs/Imaging:  INR=2.13  Consults:  Lymph    New changes this shift:  Afib on warfarin.  Torsemide.  Completed abx course.  Leg wraps on.  Foot dressings CDI.  BG stable.  Voiding.  No BM.  Denies pain.      Plan:  Discharge to [facility] today.  Ride w/ [facility] @ 10:30am.    Continue to monitor and follow POC.  "

## 2019-03-22 NOTE — PLAN OF CARE
Physical Therapy Discharge Summary    Reason for therapy discharge:    Discharged to transitional care facility.    Progress towards therapy goal(s). See goals on Care Plan in [facility] electronic health record for goal details.  Goals partially met.  Barriers to achieving goals:   discharge from facility.    Therapy recommendation(s):    Continued therapy is recommended.  Rationale/Recommendations:  Pt has been progressing well and tolerating therapy. Pt will continue to benefit from rehab to improve strength, balance, and activity tolerance to return to community at [facility]. Pt would be unsafe to return home at this time due to increased assist required for ADLs, bed mobility, impaired dynamic balance (increasing fall risk), and impaired endurance limiting gait distance in community. .

## 2019-03-22 NOTE — PROGRESS NOTES
[facility], [place]  Progress Note - Maria 1 Service   Date of Admission:  3/6/2019    Assessment & Plan   69 y/o male with PMHx significant for atrial fibrillation (CHADVASC 5), DM2, was admitted due to sepsis from sinusitis following dental procedure.     #Atrial fibrillation (CHADVASC 5)  #Hx of atrial flutter s/p ablation 2008  Patient on warfarin. Has had occasional elevated HR associated with physical activity. Discussed with EP and agree this is due to physical decompensation. Attempted to increase dose of metoprolol however BP did not allow. Currently tolerating PT and HR in adequate range.   - Warfarin per pharmacy  - Daily INR  - Continue 100mg metoprolol succinate  - On tele  - Zio patch after TCU discharge  - Will need outpatient follow up with Dr. Willoughby  - Will dosing per pharmacy    #Chronic diastolic heart failure  - Continue torsemide 75mg BID    #Pulmonary HTN, WHO group II  Pt on diuretics. Follows with Dr. Willoughby. Last RHC on 06/2018 showing mPA 27.   - To see Dr. Willoughby after discharge    #Left temple lesion   #Hx of BCC  Site of prior skin excisional biopsy. Per patient has become chronic, never fully heals and bleeds easily.  Path from 2014 consistent with BCC.  Concerning for incomplete resection/marjolin's ulcer    - Outpatient follow up with PCP or dermatology    #Sespsis  #Left maxillary sinusitis  #Recent dental tooth extraction  He completed augmentin on 03/12/19.     Abx  Augmentin (03/09/19 - 03/12/19)  Zosyn ( 03/06/19 - 03/09/19)  Vanc (03/06/19 - 03/08/19)    #Orthostatic hypotension  - Discontinued lisinopril and spironolactone  - Continue with leg wraps    #MICHELLE  Cr peaked at >4 on admission.  Resolved with fluids.  Suspect over diuresis in combination with sepsis.      Chronic Issues:  Hx HFpEF:  Follows in [facility] clinic  Hx HTN:   - Torsemide 75mg BID  - Holding spironolactone and lisinopril  - Potassium 40 AM BID  Diabetic foot ulcers:  Hx PAD:   "S/p bedside debridement by podiatry.    - Daily dressing changes, keep clean/dry  - WBAT w/ protective footwear for transfers and PT  - Follow up in podiatry clinic 1-2 weeks after discharge   WBAT  - Offloading boots  Prior CVA:  CT head, MRI/MRA brain with no acute infarct  THONG:  CPAP used at home, patient declines CPAP here in hospital, does not like our masks  DMII:  Last A1c 6.1 on 2/26/19.  Will plan to resume PTA metformin at discharge  Hypothyroidism: PTA levothyroxine  Depression/anxiety: NTD  Urinary retention: Continue PTA tamsulosin     Diet: Consistent Carbohydrate Diet 6413-6680 Calories: Moderate Consistent CHO (4-6 CHO units/meal)  Advance Diet as Tolerated    Fluids: None  Lines: PIV  DVT Prophylaxis: PTA Warfarin, pharmacy to dose  Bazzi Catheter: not present  Code Status: DNR/DNI  patient would like to discuss pressors if he needs this, if he is unable to discuss pressors because he is so altered/hypotensive, then he would NOT want pressors and the medical team should \"just let me go\"    Disposition Plan   Expected discharge: TCU placement tomorrow @ 1000    Patient staffed with Dr. Lyly Alexander MD PhD  Internal Medicine, PGY3  [phone]    Interval History    No overnight events. Motivated to discharge for rehab. Eating well. tolerating physical therapy. 4-point ROS negative.      Physical Exam   Vital Signs: Temp: 98.8  F (37.1  C) Temp src: Oral BP: 122/64 Pulse: 88 Heart Rate: 90 Resp: 20 SpO2: 95 % O2 Device: None (Room air) Oxygen Delivery: 2 LPM  Weight: 356 lbs 11.27 oz    GEN: AAOX3, NAD  HEENT: PERRLA, EOMI, anicteric sclera, difficult to assess JVD due to beard  CV: irregular rhythm, normal rate, no murmur, no rubs, no gallops,   RESP:CTAB  EXT: Bilateral legs wrapped, edema significantly improved   Skin: warm, dry, no petechiae or rashes    Results for orders placed or performed during the hospital encounter of 03/06/19 (from the past 24 hour(s))   Glucose by meter "   Result Value Ref Range    Glucose 111 (H) 70 - 99 mg/dL   Glucose by meter   Result Value Ref Range    Glucose 111 (H) 70 - 99 mg/dL   Basic metabolic panel   Result Value Ref Range    Sodium 140 133 - 144 mmol/L    Potassium 3.4 3.4 - 5.3 mmol/L    Chloride 104 94 - 109 mmol/L    Carbon Dioxide 28 20 - 32 mmol/L    Anion Gap 8 3 - 14 mmol/L    Glucose 130 (H) 70 - 99 mg/dL    Urea Nitrogen 24 7 - 30 mg/dL    Creatinine 0.91 0.66 - 1.25 mg/dL    GFR Estimate 86 >60 mL/min/[1.73_m2]    GFR Estimate If Black >90 >60 mL/min/[1.73_m2]    Calcium 8.8 8.5 - 10.1 mg/dL   Magnesium   Result Value Ref Range    Magnesium 2.1 1.6 - 2.3 mg/dL   INR   Result Value Ref Range    INR 2.13 (H) 0.86 - 1.14   Glucose by meter   Result Value Ref Range    Glucose 129 (H) 70 - 99 mg/dL   Glucose by meter   Result Value Ref Range    Glucose 101 (H) 70 - 99 mg/dL   Glucose by meter   Result Value Ref Range    Glucose 117 (H) 70 - 99 mg/dL

## 2019-03-22 NOTE — PLAN OF CARE
OT/Edema: Pt with 1-2+ pitting edema in feet, ankles and legs. Skin integrity intact. After skin cares GCBs placed from MTPs to knee creases. Please remove garment if it gets soiled or if pt c/o LE pain or numbness. Will return 3/24 for skin check.

## 2019-03-22 NOTE — PLAN OF CARE
Patient is a 68 year old male  admitted to room 432 via strtcher.  Patient is alert and oriented X 3. See Epic for VS and assessment.  Patient is able to transfer AO1 using walker. Patient was settled into their room, shown call light, tv, mealtimes etc. Oriented to unit. Will continue monitoring pain level and VS. Notifying MD with any concerns.  Follow MD orders for cares and medications.    Level of Schooling:high school  Ethnicity:  Marital Status:single  Dentures: No  Hearing Aid: No  Smoker:  No  Glasses: Yes  Occupation: disability  Falls 0-1 mo: 0 2-6 mo: 0  Stairs prior function: Not applicable  Prior device use: Walker   Advanced Care Directive Referral to Social Work?No

## 2019-03-22 NOTE — PLAN OF CARE
Pt is alert and oriented. Up with 1 and a walker. Denies pain. Good appetite. Voiding large amounts. Declined assessment of his feet and back/buttocks. Calls appropriately. Pt is eager to discharge to  TCU.

## 2019-03-22 NOTE — PROGRESS NOTES
19 1700   General Information   Did the Initial Social Work Assessment result in a Social Work Case? Yes   Living Environment   Lives With alone   Current Living Arrangements home/apartment/condo   Able to Return to Prior Arrangements yes   Home Safety   Patient Feels Safe Living in Home? yes   Assessment of Family/Social Support   Marital Status Single   Who is your support system? Other (specify)   Description of Support System (2 friends, Janice Kady and Dre Lucas)   Support Assessment Limited social contact and support     Social Work: Initial Assessment with Discharge Plan    Patient Name: Clarke Moreira  : 1950  Age: 68 year old  MRN: [id number]  Completed assessment with: patient  Admitted to TCU: 3/22/19    Presenting Information   Date of SW assessment: 2019  Health Care Directive: Will bring in copy.  Pt states his copy of his health care directive is at home and he has no way of bringing in the copy during this TCU stay.  SW suggested he bring it with to his next clinic appointment.  Primary Health Care Agent: friend, Dre Lucas, per pt's report  Secondary Health Care Agent: NA  Living Situation: pt lives alone in an apartment  Previous Functional Status: pt was entirely independent  DME available: Walker, grabbars by the toilet, and a hospital bed; has a CPAP  Patient and family understanding of hospitalization: to get stronger and get wound care  Cultural/Language/Spiritual Considerations:  male  Abuse concerns: Pt denies any abuse concerns in any of his relationships.  BIMS: Pt scored 13 on BIMS indicating mild impairment.  Pt attributes not being able to remember 2 of the 3 words without cueing due to it being a stressful day admitting to the TCU.  PHQ-9: Pt scored 4 on PHQ-9 indicating mild symptoms.  Pt states he feels tired and little energy due to his CHF and diabetic complications.  PAS: confirmation number- [id number]  Has there been a level  "II screen?  No  Were there any recommendations in the screen? No  If yes, will the recommendations we incorporated into the Plan of Care?  N/A  Physical Health  Reason for admission: No diagnosis found.    Provider Information   Primary Care Physician:Matthias Sanchez Pt indicates [facility] is his PCP clinic.  : Could have access to [facility] clinic  if needed.    Mental Health:   Diagnosis: Long history of depression and PTSD, dating back to childhood.  Pt states he also has a history of passive suicidal ideation but has never attempted nor will ever attempt suicide due to \"being a coward\" and wanting to \"see what happens next\" in the world news and in politics.  Current Support/Services: Sometimes sees a psychiatrist at [facility] though he admits this has not been for a while due to his medical issues and difficulty getting to the clinic due to the harsh weather conditions this winter.  Previous Services: Psychiatry at [facility]; long-term therapy client  Services Needed/Recommended: Pt appears to enjoy the socialization from health care professionals.    Substance Use:  Diagnosis: History of alcohol abuse.  He states he has been sober for \"20 years\".  States he was \"never physically addicted\".   Current Support/Services: None currently.  Previous Services: AA meetings; out-pt CD tx at [facility]  Services Needed/Recommended: None.    Support System  Marital Status: Single  Family support: None. Did not mention any family members as part of his support system.  Other support available: Dre is a long-time friend; provides mainly emotional support as Dre is busy with his own family and grandchildren.  Janice is a friend whom he met when he used the service, [employer].  He no longer uses [employer] but they have remained friends and she often delivers groceries to him.    Gaps in support system: Does not have 24-hr care; limited support system.    Community Resources  Current in home " services: Open to [facility] RN services.  Home oxygen through [facility].  Uses [employer] exclusively as main transport provider.  Pt does not drive.  Previous services: [employer]; not currently.    Financial/Employment/Education  Employment Status: Disability.  Income Source: Social Security.  Education: High school.  Financial Concerns:  No.  Insurance: [facility] Medicare.      Discharge Plan   Patient and family discharge goal: pt plans to return to his apartment when he has met his goals here.  Provided Education on discharge plan: YES  Patient agreeable to discharge plan:  YES  A list of Medicare Certified Facilities was provided to the patient and/or family to encourage patient choice. Based on location and rating, patient would like referrals made to: No, long-term SNF not indicated at this point.    Barriers to discharge: Medical stability; completion of therapy goals.    Discharge Recommendations   Disposition: Pt hopes to return home.  Transportation Needs: [employer].      Additional comments   Pt plans on using [employer] at time of discharge from  TCU so will need at least a 24-hr notice of discharge.    Pt very pleasant and social throughout his interview; articulates his thoughts and feelings well.  He states he has appreciated all of the care and socialization he has had with this hospitalization.  No cognitive concerns noted throughout the interview.  States it has been quite a busy, stressful day with admitting to the TCU today.      MERVAT will continue to follow for support and discharge planning.    RIGOBERTO Santos  Social Work Services  [facility]  [phone] phone    On-call pager, [phone], 1600 to midnight

## 2019-03-22 NOTE — PHARMACY-ANTICOAGULATION SERVICE
Clinical Pharmacy - Warfarin Dosing Consult     Pharmacy has been consulted to manage this patient s warfarin therapy.  Indication: Atrial Fibrillation  Therapy Goal: INR 2-3  Warfarin Prior to Admission: Yes  Recent documented change in oral intake/nutrition: No    INR   Date Value Ref Range Status   03/22/2019 2.12 (H) 0.86 - 1.14 Final   03/21/2019 2.13 (H) 0.86 - 1.14 Final       Recommend warfarin 10 mg today.  Pharmacy will monitor Clarke Marreroon daily and order warfarin doses to achieve specified goal.      Please contact pharmacy as soon as possible if the warfarin needs to be held for a procedure or if the warfarin goals change.

## 2019-03-22 NOTE — PROGRESS NOTES
Transitional Care Unit  Extended Progress Note  Mi Melendez PA-C  3/22/2019         Assessment and Plan:   Clarke Moreira is a 68 year old male with a history of afib on coumadin, HFpEF, DMII, and recent dental (2/26/19) who was admitted to [facility] on 3/6/2019 for sepsis and MICHELLE. Hospital course was complicated by Afib with RVR and orthostatic hypotension. Transferred to TCU on 3/22/19 for ongoing therapies.    Afib (CHADVASC 5) s/p ablation (2008) on coumadin. Seen by cardiology while inpatient given episodes of Afib w/ RVR. Attempted to increase dose of metoprolol but unable to due to BP. HR currently controlled.  - Pharmacy to dose coumadin  - Daily INR  - Continue metoprolol 100 mg daily  - F/u with Dr. Willoughby outpatient  - Will need 14 day Zio patch on discharge    HFpEF.  HTN.   PTA managed on torsemide, spironolactone and lisinopril. Spironolactone and lisinopril discontinued during hospital admission given MICHELLE. Torsemide dose decreased significantly at [facility] from 300 mg BID PTA to 75 mg BID.  - Continue torsemide  - Continue potassium 40 mg BID  - Pt to follow up in [facility] clinic as outpatient  - Daily weights  - I/Os    Orthostatic Hypotension.  Bilateral legs wrapped. Lisinopril and spironolactone discontinued.   - Up with assist  - Continue to monitor     Pulmonary HTN, WHO group II. Follows with Dr. Villarreal. On diuretics.  - Continue torsemide  - F/u with Dr. Willoughby outpatient    L temple lesion  Hx of BCC  Lesion at site of prior excisional biopsy. Per patient area never fully elian. Path from 2014 c/w BCC.  - Follow up with dermatology OP    DMII. Last Hgb A1C 6.1 on 2/26/19. Metformin held on admission to [facility], resumed on discharge. Not on insulin.   - Resume metformin BID ac  - Moderate consistent CHO diet  - BG BID  - BMP 3/23, then M/th    Hypothyroidism.  - Continue Levothyroxine    Diabetic foot ulcers.  Hx of PAD.  VICK 9/24/17 w/ possible occlusion of R anterior tibila artery and  dorsalis pedis. Black eschar on great toes on admission. S/p bedside debridement per podiatry at [facility].   - Continue daily dressing changes  - Follow up in Podiatry clinic in 1-2 weeks  - Offloading boots    THONG. Has CPAP at home. Refusing here.    Urinary retention.   - Continue flomax    Prior CVA. Per CT, MRI/MRA brain at [facility] during recent admission.    Resolved Hospital Issues:    Sepsis 2/2 maxillary sinusitis following dental extraction, resolved. Presented to [facility] with generalized weakness, HA, tachycardia, LA 2.8 and new MICHELLE (as below). S/p molar tooth extraction 2/26/19 w/ dry socket. CT head w/o contrast 3/6/19 consistented with L maxillary sinusitis. Completed abx 3/12/19. Currently afebrile. Denies headache, fevers, chills, dental pain or sinus pressure.     MICHELLE. Creatinine peaked at 4.15 on admission to [facility]. Thought secondary to dehydration. Resolved with fluids, holding diuretics, and treatment of sepsis.  Creatinine stable.   - BMP M/Th      Discussed with Dr. Hinds.     Diet and/or tube feedings: Mod consistant CHO diet  Lines, tubes, drains: none  DVT/GI prophylaxis: Warfarin  Indications for psychotropic medications: No diagnosis  Pneumococcal Vaccination Status: Prevnar 13 complete, Will give Prevnar 23 before discharge  Code status discussed on admission: DNR / DNI, per discussion with patient    Mi Melendez PA-C  Hospitalist Service  [phone]         Consults:   PT, OT, Pharmacy to dose coumadin         History of Present Illness:   Clarke Moreira is a 68 year old male with a history of afib on coumadin, HFpEF, DMII, and recent dental (2/26/19) who was admitted to [facility] on 3/6/2019 for sepsis and MICHELLE. Hospital course was complicated by Afib with RVR and orthostatic hypotension. Transferred to TCU on 3/22/19 for ongoing therapies.    Currently, patient complains of ongoing fatigue. He states that given his extensive hospitalization he has lost a great amount of his strength and gets  winded quite easily. He denies shortness of breath at rest. He denies chest pain or difficulty breathing. He denies dental pain, fevers, chills, congestion or sinus pressure. He notes normal BM's every 2-3 days. No issues with urination.          Physical Exam:   There were no vitals taken for this visit.    Constitutional: healthy, alert and no distress   HEENT: Head normocephalic, atraumatic. Pinpoint scab center of previous skin biopsy site above L orbit.  Cardiovascular: irregular rhythm, no murmurs or gallops  Respiratory: Lungs CTAB, no wheezing or crackles  Psychiatric: mentation appears normal and affect normal/bright  Abdomen: Abdomen soft, rotund, non-tender. BS normal. No masses, organomegaly  NEURO: alert and oriented x 3, CNs grossly intact  SKIN: warm and dry to touch  Extremities: Bilateral legs wrapped, R great toe wrapped         Past Medical History:     Past Medical History:   Diagnosis Date     Atrial fibrillation (H)      Basal cell carcinoma      Chronic anticoagulation      Diastolic heart failure (H)      Dyslipidemia      Essential hypertension      Morbid obesity (H)      Sleep-disordered breathing      Type 2 diabetes mellitus (H)              Past Surgical History:      Past Surgical History:   Procedure Laterality Date     BIOPSY OF SKIN LESION       MOHS MICROGRAPHIC PROCEDURE       PICC INSERTION Right 09/24/2017    5fr TL Bard PICC, 51cm (1cm external) in the R medial brachial vein w/ tip in the SVC.             Family History:     Family History   Problem Relation Age of Onset     Depression Mother      Glaucoma Maternal Grandfather      Cancer No family hx of         No family history of skin cancer     Macular Degeneration No family hx of              Social History:     Social History     Tobacco Use     Smoking status: Never Smoker     Smokeless tobacco: Never Used   Substance Use Topics     Alcohol use: No     Comment: Former alcohol abuse. Quit 70s then quit later in  80s-present        Living situation prior to admission: Independent          Medications:     Current Facility-Administered Medications on File Prior to Encounter:  aspirin EC tablet 81 mg   atorvastatin (LIPITOR) tablet 40 mg   chlorhexidine (PERIDEX) 0.12 % solution 15 mL   levothyroxine (SYNTHROID/LEVOTHROID) tablet 175 mcg   magnesium sulfate 4 g in 100 mL sterile water (premade)   metoprolol (LOPRESSOR) injection 5 mg   metoprolol succinate ER (TOPROL-XL) 24 hr tablet 100 mg   naloxone (NARCAN) injection 0.1-0.4 mg   nystatin (MYCOSTATIN) cream   ondansetron (ZOFRAN-ODT) ODT tab 4 mg   Or   ondansetron (ZOFRAN) injection 4 mg   Patient is already receiving anticoagulation with heparin, enoxaparin (LOVENOX), warfarin (COUMADIN)  or other anticoagulant medication   potassium chloride ER (K-DUR/KLOR-CON M) CR tablet 40 mEq   potassium chloride ER (K-DUR/KLOR-CON M) CR tablet 40 mEq   sennosides (SENOKOT) tablet 8.6 mg   tamsulosin (FLOMAX) capsule 0.4 mg   torsemide (DEMADEX) tablet 75 mg   [COMPLETED] warfarin (COUMADIN) tablet 10 mg   Warfarin Therapy Reminder (Check START DATE - warfarin may be starting in the FUTURE)     Current Outpatient Medications on File Prior to Encounter:  ammonium lactate (LAC-HYDRIN) 12 % external cream Apply topically 2 times daily as needed for dry skin   Ascorbic Acid (VITAMIN C) POWD Take 1,000 mcg by mouth daily   aspirin (ASA) 81 MG tablet Take 1 tablet (81 mg) by mouth daily   atorvastatin (LIPITOR) 40 MG tablet Take 1 tablet (40 mg) by mouth daily   blood glucose monitoring (ONE TOUCH DELICA) lancets Use to test blood sugars 2 times daily or as directed.   blood glucose monitoring (ONE TOUCH ULTRA MINI) meter device kit Use to test blood sugars 2 times daily or as directed.   blood glucose monitoring (ONETOUCH ULTRA) test strip Use to test blood sugars 2 times daily or as directed.   levothyroxine (SYNTHROID/LEVOTHROID) 175 MCG tablet Take 1 tablet (175 mcg) by mouth every  morning (before breakfast)   metFORMIN (GLUCOPHAGE) 500 MG tablet Take 1 tablet (500 mg) by mouth 2 times daily (with meals)   metoprolol succinate ER (TOPROL-XL) 100 MG 24 hr tablet Take 1 tablet (100 mg) by mouth daily   multivitamin w/minerals (MULTI-VITAMIN) tablet Take 1 tablet by mouth daily   nystatin (MYCOSTATIN) 966832 UNIT/GM external cream Apply topically 2 times daily as needed for dry skin   omega 3 1000 MG CAPS Take 1 g by mouth daily   order for DME Equipment being ordered: Bariatric Lift chairDiagnosis - morbid obesity, CHF, LymphedemaFax to Ne from [employer] at [phone].   order for DME Equipment being ordered: Other: Velcro Compression stockings. Treatment Diagnosis: bilateral lymphedema.   polyethylene glycol (MIRALAX/GLYCOLAX) packet Take 17 g by mouth daily as needed for constipation   potassium chloride ER (K-DUR/KLOR-CON M) 20 MEQ CR tablet Take 2 tablets (40 mEq) by mouth 2 times daily   senna-docusate (SENOKOT-S/PERICOLACE) 8.6-50 MG tablet Take 1-2 tablets by mouth 2 times daily as needed for constipation   tamsulosin (FLOMAX) 0.4 MG capsule Take 1 capsule (0.4 mg) by mouth daily   torsemide (DEMADEX) 5 MG tablet Take 15 tablets (75 mg) by mouth 2 times daily   vitamin B complex with vitamin C (VITAMIN  B COMPLEX) tablet Take 1 tablet by mouth daily   vitamin E (VITAMIN E COMPLEX) 1000 units (450 mg) capsule Take 1 capsule (1,000 Units) by mouth daily   warfarin (COUMADIN) 10 MG tablet Take 1 tablet (10 mg) by mouth once for 1 dose   warfarin (COUMADIN) 5 MG tablet Take 2.5 tablets (12.5 mg) by mouth daily   WARFARIN NO DOSE TODAY 1 each once for 1 dose            Allergies:   No Known Allergies          Labs:     Lab Results   Component Value Date    WBC 7.7 03/16/2019    HGB 14.7 03/16/2019    HCT 45.9 03/16/2019     03/16/2019     03/21/2019    POTASSIUM 3.4 03/21/2019    CHLORIDE 104 03/21/2019    CO2 28 03/21/2019    BUN 24 03/21/2019    CR 0.91 03/21/2019    GLC  130 (H) 03/21/2019    SED 19 03/06/2019    DD 0.8 (H) 09/23/2017    NTBNPI 2,329 (H) 03/06/2019    NTBNP 1,462 (H) 02/19/2019    TROPONIN 0.00 03/06/2019    TROPI <0.015 09/24/2017    AST 14 03/06/2019    ALT 18 03/06/2019    ALKPHOS 105 03/06/2019    BILITOTAL 0.5 03/06/2019    INR 2.12 (H) 03/22/2019

## 2019-03-22 NOTE — PLAN OF CARE
700-1030     VSS on RA. A/Ox4. Denies pain/nausea. Up with A1+W. Wounds on jen big toes CDI dressings changed. Belongings gathered and PIV pulled. Plan for discharge this morning to  TCU via [facility] transport.

## 2019-03-23 LAB
ANION GAP SERPL CALCULATED.3IONS-SCNC: 10 MMOL/L (ref 3–14)
BUN SERPL-MCNC: 22 MG/DL (ref 7–30)
CALCIUM SERPL-MCNC: 8.7 MG/DL (ref 8.5–10.1)
CHLORIDE SERPL-SCNC: 100 MMOL/L (ref 94–109)
CO2 SERPL-SCNC: 30 MMOL/L (ref 20–32)
CREAT SERPL-MCNC: 0.99 MG/DL (ref 0.66–1.25)
GFR SERPL CREATININE-BSD FRML MDRD: 78 ML/MIN/{1.73_M2}
GLUCOSE BLDC GLUCOMTR-MCNC: 103 MG/DL (ref 70–99)
GLUCOSE BLDC GLUCOMTR-MCNC: 104 MG/DL (ref 70–99)
GLUCOSE BLDC GLUCOMTR-MCNC: 112 MG/DL (ref 70–99)
GLUCOSE SERPL-MCNC: 112 MG/DL (ref 70–99)
INR PPP: 1.9 (ref 0.86–1.14)
MAGNESIUM SERPL-MCNC: 2.2 MG/DL (ref 1.6–2.3)
POTASSIUM SERPL-SCNC: 3.1 MMOL/L (ref 3.4–5.3)
POTASSIUM SERPL-SCNC: 3.7 MMOL/L (ref 3.4–5.3)
SODIUM SERPL-SCNC: 140 MMOL/L (ref 133–144)

## 2019-03-23 PROCEDURE — 25000125 ZZHC RX 250: Performed by: PHYSICIAN ASSISTANT

## 2019-03-23 PROCEDURE — 80048 BASIC METABOLIC PNL TOTAL CA: CPT | Performed by: PHYSICIAN ASSISTANT

## 2019-03-23 PROCEDURE — 97535 SELF CARE MNGMENT TRAINING: CPT | Mod: GO | Performed by: OCCUPATIONAL THERAPIST

## 2019-03-23 PROCEDURE — 97116 GAIT TRAINING THERAPY: CPT | Mod: GP | Performed by: PHYSICAL THERAPIST

## 2019-03-23 PROCEDURE — 36415 COLL VENOUS BLD VENIPUNCTURE: CPT | Performed by: PHYSICIAN ASSISTANT

## 2019-03-23 PROCEDURE — 99221 1ST HOSP IP/OBS SF/LOW 40: CPT | Performed by: PSYCHIATRY & NEUROLOGY

## 2019-03-23 PROCEDURE — 25000132 ZZH RX MED GY IP 250 OP 250 PS 637: Performed by: INTERNAL MEDICINE

## 2019-03-23 PROCEDURE — 25000132 ZZH RX MED GY IP 250 OP 250 PS 637: Performed by: PHYSICIAN ASSISTANT

## 2019-03-23 PROCEDURE — 97162 PT EVAL MOD COMPLEX 30 MIN: CPT | Mod: GP | Performed by: PHYSICAL THERAPIST

## 2019-03-23 PROCEDURE — 85610 PROTHROMBIN TIME: CPT | Performed by: PHYSICIAN ASSISTANT

## 2019-03-23 PROCEDURE — 00000146 ZZHCL STATISTIC GLUCOSE BY METER IP

## 2019-03-23 PROCEDURE — 84132 ASSAY OF SERUM POTASSIUM: CPT | Performed by: PHYSICIAN ASSISTANT

## 2019-03-23 PROCEDURE — 83735 ASSAY OF MAGNESIUM: CPT | Performed by: PHYSICIAN ASSISTANT

## 2019-03-23 PROCEDURE — 12000022 ZZH R&B SNF

## 2019-03-23 PROCEDURE — 99306 1ST NF CARE HIGH MDM 50: CPT | Performed by: INTERNAL MEDICINE

## 2019-03-23 PROCEDURE — 97167 OT EVAL HIGH COMPLEX 60 MIN: CPT | Mod: GO | Performed by: OCCUPATIONAL THERAPIST

## 2019-03-23 PROCEDURE — 97110 THERAPEUTIC EXERCISES: CPT | Mod: GP | Performed by: PHYSICAL THERAPIST

## 2019-03-23 RX ORDER — POTASSIUM CHLORIDE 1.5 G/1.58G
20-40 POWDER, FOR SOLUTION ORAL
Status: DISCONTINUED | OUTPATIENT
Start: 2019-03-23 | End: 2019-04-03 | Stop reason: HOSPADM

## 2019-03-23 RX ORDER — POTASSIUM CHLORIDE 750 MG/1
40 TABLET, EXTENDED RELEASE ORAL 2 TIMES DAILY
Status: DISCONTINUED | OUTPATIENT
Start: 2019-03-23 | End: 2019-04-03 | Stop reason: HOSPADM

## 2019-03-23 RX ORDER — TAMSULOSIN HYDROCHLORIDE 0.4 MG/1
0.4 CAPSULE ORAL DAILY
Status: DISCONTINUED | OUTPATIENT
Start: 2019-03-24 | End: 2019-04-03 | Stop reason: HOSPADM

## 2019-03-23 RX ORDER — MULTIPLE VITAMINS W/ MINERALS TAB 9MG-400MCG
1 TAB ORAL DAILY
Status: DISCONTINUED | OUTPATIENT
Start: 2019-03-24 | End: 2019-04-03 | Stop reason: HOSPADM

## 2019-03-23 RX ORDER — ATORVASTATIN CALCIUM 20 MG/1
40 TABLET, FILM COATED ORAL EVERY EVENING
Status: DISCONTINUED | OUTPATIENT
Start: 2019-03-23 | End: 2019-04-03 | Stop reason: HOSPADM

## 2019-03-23 RX ORDER — ASCORBIC ACID 500 MG
500 TABLET ORAL DAILY
Status: DISCONTINUED | OUTPATIENT
Start: 2019-03-24 | End: 2019-04-03 | Stop reason: HOSPADM

## 2019-03-23 RX ORDER — METOPROLOL SUCCINATE 100 MG/1
100 TABLET, EXTENDED RELEASE ORAL DAILY
Status: DISCONTINUED | OUTPATIENT
Start: 2019-03-24 | End: 2019-04-03 | Stop reason: HOSPADM

## 2019-03-23 RX ORDER — POTASSIUM CHLORIDE 750 MG/1
40 TABLET, EXTENDED RELEASE ORAL ONCE
Status: COMPLETED | OUTPATIENT
Start: 2019-03-23 | End: 2019-03-23

## 2019-03-23 RX ORDER — LEVOTHYROXINE SODIUM 175 UG/1
175 TABLET ORAL
Status: DISCONTINUED | OUTPATIENT
Start: 2019-03-24 | End: 2019-04-03 | Stop reason: HOSPADM

## 2019-03-23 RX ORDER — POTASSIUM CHLORIDE 29.8 MG/ML
20 INJECTION INTRAVENOUS
Status: DISCONTINUED | OUTPATIENT
Start: 2019-03-23 | End: 2019-04-03 | Stop reason: HOSPADM

## 2019-03-23 RX ORDER — CHLORAL HYDRATE 500 MG
1 CAPSULE ORAL DAILY
Status: DISCONTINUED | OUTPATIENT
Start: 2019-03-24 | End: 2019-04-03 | Stop reason: HOSPADM

## 2019-03-23 RX ORDER — POTASSIUM CHLORIDE 750 MG/1
20-40 TABLET, EXTENDED RELEASE ORAL
Status: DISCONTINUED | OUTPATIENT
Start: 2019-03-23 | End: 2019-04-03 | Stop reason: HOSPADM

## 2019-03-23 RX ORDER — CHLORHEXIDINE GLUCONATE ORAL RINSE 1.2 MG/ML
15 SOLUTION DENTAL 2 TIMES DAILY
Status: DISCONTINUED | OUTPATIENT
Start: 2019-03-23 | End: 2019-04-03 | Stop reason: HOSPADM

## 2019-03-23 RX ORDER — POTASSIUM CHLORIDE 7.45 MG/ML
10 INJECTION INTRAVENOUS
Status: DISCONTINUED | OUTPATIENT
Start: 2019-03-23 | End: 2019-04-03 | Stop reason: HOSPADM

## 2019-03-23 RX ORDER — POTASSIUM CL/LIDO/0.9 % NACL 10MEQ/0.1L
10 INTRAVENOUS SOLUTION, PIGGYBACK (ML) INTRAVENOUS
Status: DISCONTINUED | OUTPATIENT
Start: 2019-03-23 | End: 2019-04-03 | Stop reason: HOSPADM

## 2019-03-23 RX ORDER — ASPIRIN 81 MG/1
81 TABLET ORAL DAILY
Status: DISCONTINUED | OUTPATIENT
Start: 2019-03-24 | End: 2019-04-03 | Stop reason: HOSPADM

## 2019-03-23 RX ORDER — MULTIVIT WITH MINERALS/LUTEIN
1000 TABLET ORAL DAILY
Status: DISCONTINUED | OUTPATIENT
Start: 2019-03-24 | End: 2019-04-03 | Stop reason: HOSPADM

## 2019-03-23 RX ORDER — WARFARIN SODIUM 7.5 MG/1
15 TABLET ORAL
Status: COMPLETED | OUTPATIENT
Start: 2019-03-23 | End: 2019-03-23

## 2019-03-23 RX ADMIN — METFORMIN HYDROCHLORIDE 500 MG: 500 TABLET ORAL at 18:14

## 2019-03-23 RX ADMIN — Medication 1000 UNITS: at 09:39

## 2019-03-23 RX ADMIN — CHLORHEXIDINE GLUCONATE 0.12% ORAL RINSE 15 ML: 1.2 LIQUID ORAL at 14:07

## 2019-03-23 RX ADMIN — CHLORHEXIDINE GLUCONATE 0.12% ORAL RINSE 15 ML: 1.2 LIQUID ORAL at 21:00

## 2019-03-23 RX ADMIN — Medication 5 UNITS: at 14:07

## 2019-03-23 RX ADMIN — OXYCODONE HYDROCHLORIDE AND ACETAMINOPHEN 500 MG: 500 TABLET ORAL at 09:36

## 2019-03-23 RX ADMIN — ATORVASTATIN CALCIUM 40 MG: 20 TABLET, FILM COATED ORAL at 21:01

## 2019-03-23 RX ADMIN — METFORMIN HYDROCHLORIDE 500 MG: 500 TABLET ORAL at 09:38

## 2019-03-23 RX ADMIN — ASPIRIN 81 MG: 81 TABLET, COATED ORAL at 09:36

## 2019-03-23 RX ADMIN — Medication 1 G: at 09:35

## 2019-03-23 RX ADMIN — METOPROLOL SUCCINATE 100 MG: 100 TABLET, EXTENDED RELEASE ORAL at 09:37

## 2019-03-23 RX ADMIN — MULTIPLE VITAMINS W/ MINERALS TAB 1 TABLET: TAB at 09:37

## 2019-03-23 RX ADMIN — TAMSULOSIN HYDROCHLORIDE 0.4 MG: 0.4 CAPSULE ORAL at 09:36

## 2019-03-23 RX ADMIN — TORSEMIDE 75 MG: 20 TABLET ORAL at 14:45

## 2019-03-23 RX ADMIN — WARFARIN SODIUM 15 MG: 7.5 TABLET ORAL at 18:13

## 2019-03-23 RX ADMIN — POTASSIUM CHLORIDE 40 MEQ: 750 TABLET, EXTENDED RELEASE ORAL at 09:55

## 2019-03-23 RX ADMIN — POTASSIUM CHLORIDE 40 MEQ: 750 TABLET, EXTENDED RELEASE ORAL at 21:01

## 2019-03-23 RX ADMIN — LEVOTHYROXINE SODIUM 175 MCG: 175 TABLET ORAL at 06:34

## 2019-03-23 RX ADMIN — B-COMPLEX W/ C & FOLIC ACID TAB 1 TABLET: TAB at 09:35

## 2019-03-23 RX ADMIN — POTASSIUM CHLORIDE 40 MEQ: 750 TABLET, EXTENDED RELEASE ORAL at 09:40

## 2019-03-23 ASSESSMENT — ACTIVITIES OF DAILY LIVING (ADL): PREVIOUS_RESPONSIBILITIES: MEAL PREP;HOUSEKEEPING;LAUNDRY;SHOPPING;MEDICATION MANAGEMENT;FINANCES

## 2019-03-23 NOTE — CONSULTS
Consult Date:  03/23/2019      REQUESTING SOURCE:  Dr. Kenji Hinds.      IDENTIFYING DATA:  This 68-year-old man is seen today for a psychiatric consultation regarding his history of depression, and he expressed some suicidal ideation.      HISTORY OF PRESENT ILLNESS:  Mr. Moreira has a history of atrial fibrillation on Coumadin, as well as type 2 diabetes and morbid obesity.  He had been admitted to [facility] on 03/06/2019 for sepsis and acute kidney injury related to sinusitis.  He is now recovered from this event and has been admitted to acute rehabilitation for reconditioning.  At home, he does get around his small apartment okay despite his obesity and the various disabilities.  However, after the hospitalization, he needs quite a bit of help.  I saw him when he was getting back in bed with the assistance of physical therapy, and he really was struggling.      From a psychiatric perspective, he said he has been depressed since childhood in the context of a difficult childhood due to having a father around and his mother who had some kind of mental health problems.  He said more recently while in the hospital and being around people, he feels really well with resolution of his depression.  However, at some point during his intake process, he had mentioned to one of the team that he was feeling suicidal.  I asked him to clarify this when I saw him today, and he said he does indeed occasionally feel suicidal, but he feels safe.  He has no intent or plan.  He has occasional anxiety due to situational factors but no panic attacks.  No history of mood cycling or manic symptoms.  No OCD or thought disorder symptoms.  He was in therapy for a number of years for what was thought to be posttraumatic stress disorder due to his childhood, but his symptoms have now resolved.        PAST PSYCHIATRIC HISTORY/SUBSTANCE ABUSE HISTORY:  No psychiatric hospitalizations.  He says years ago, he was on some antidepressants, but they  "gave him side effects.  He cannot remember the name of them.  He has been involved in psychotherapy quite a bit but not recently.  He said quit drinking over 20 years ago on his own, but this was following several treatments here at [facility].  No use of cannabis or tobacco.      PAST MEDICAL HISTORY:  Extensive.       MEDICAL AND SURGICAL HISTORY:  Reviewed and are covered in the admission note from Dr. Hinds on 03/23.      ALLERGIES:  NO KNOWN ALLERGIES TO MEDICATIONS.       OUTPATIENT AND CURRENT PSYCHOTROPIC MEDICATIONS:  None.      REVIEW OF SYSTEMS:  A 10-point review of systems per admission H and P.        FAMILY HISTORY:  He says there are some alcohol use problems, depression and anxiety on his mother's side of the family.  He does not know the details of his father's side of the family.      SOCIAL HISTORY:  He raised in [place].  He is the only child by just his mother.  Apparently, his mother and father met while his father was [facility], and he passed away when Clarke was about a year old.  He did graduate high school.  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  He retired at age 55 due to his various health problems.  He lives alone in an apartment, has few friends and no family contact.  He said he has 1 friend who visits him once a week and brings him groceries, etc.  Also, there is a  available at the assisted living facility where he lives.      MENTAL STATUS EXAMINATION:  When first seen, he is in the process of being in bed and does really struggle with this due to generalized weakness and his obesity.  He is well groomed.  Speech is fluent.  Thought process directed.  Pleasant and cooperative.  Associations tight.  He denies delusions or hallucinations.  He described his mood as \"fair.\"  Affect is a bit restricted.  He is oriented x 3.  Recent and remote memory, attention " span and concentration are intact.  Use of language and fund of knowledge are intact.  Insight and judgment are fair.      VITAL SIGNS:  Blood pressure 120/55, pulse 91, temperature 96.4.      DIAGNOSES:  Major depressive disorder, recurrent, mild.      ASSESSMENT:  He does have long-term low-grade depression and has some suicidal ideation associated with this at times.  He has no intent of proceeding with self-harm and feels safe.  Also, suicidal thoughts are not particularly intrusive.  It is my impression that he is safe in the hospital without a sitter.  He does understand to contact nursing if he does not feel safe.  At this point, I do not see any compelling reason to try antidepressants.      RECOMMENDATIONS:   1.  It is okay to discontinue the bedside attendant and suicide precautions.   2.  Will hold on antidepressants.   3.  Reconsult Psychiatry as needed.         SAMSON BREEN MD             D: 2019   T: 2019   MT:       Name:     AJIT CALLAHAN   MRN:      [id number]        Account:       [id number]   :      1950           Consult Date:  2019      Document: [id number]

## 2019-03-23 NOTE — PROGRESS NOTES
" 03/23/19 0802   Quick Adds   Quick Adds Certification   Type of Visit Initial PT Evaluation   Living Environment   Lives With alone   Living Arrangements apartment   Home Accessibility no concerns   Transportation Anticipated agency  ([employer])   Living Environment Comment no TRINITY--elevator, has a tub shower in his bathroom but has not used in last few months due to not feeling safe with use. pt has home RN 2x/week, wound care. pt reports cooking and cleaning on own but these are becoming more difficult   Self-Care   Usual Activity Tolerance fair   Current Activity Tolerance fair   Regular Exercise Yes   Activity/Exercise Type strength training   Exercise Amount/Frequency 2 times/wk  (ex from OT/PT)   Equipment Currently Used at Home hospital bed;grab bar, toilet;walker, rolling   Activity/Exercise/Self-Care Comment typically able to furniture walk and walk with FWW, transfers and performs toileting with grab bar; completes sponge baths due to small size of shower.    Functional Level Prior   Ambulation 1-->assistive equipment   Transferring 1-->assistive equipment   Toileting 1-->assistive equipment   Bathing 1-->assistive equipment   Communication 0-->understands/communicates without difficulty   Swallowing 0-->swallows foods/liquids without difficulty   Cognition 0 - no cognition issues reported   Fall history within last six months no   Which of the above functional risks had a recent onset or change? ambulation;transferring   Prior Functional Level Comment IND with mobility with WW or \"furniture walk\"   General Information   Onset of Illness/Injury or Date of Surgery - Date 03/06/19   Referring Physician Mi Melendez PA-C; Dr Luis Nova   Patient/Family Goals Statement walk to be able to get back to apt--needs to go 100--150 ft to elevator   Pertinent History of Current Problem (include personal factors and/or comorbidities that impact the POC) 68 year old male with PMH of afib on warfarin, HFpEF, " T2DM, and recent dental work (2/26/19) who is admitted on 3/6/2019 with severe sepsis and MICHELLE. He in clinically stable, lactic acidosis improved following fluids. He was found to have bilateral great toe osteomyelitis as well as left maxiallary sinusitis. Started on broad spectrum antibiotics.    Heart Disease Risk Factors High blood pressure;Gender;Age;Medical history   General Observations pt lying in bed; agreeable to PT   General Info Comments pt reports uses 2L O2 at home when sleeping and sitting but is able to maintain saturation with activity. Reports L ankle started bothering a few days ago when wraps were left off.   Cognitive Status Examination   Orientation orientation to person, place and time   Level of Consciousness alert   Follows Commands and Answers Questions 100% of the time   Personal Safety and Judgment intact   Memory intact   Pain Assessment   Patient Currently in Pain (R ankle)   Integumentary/Edema   Integumentary/Edema Comments has GCB on LE--pt reports has velcro garment uses at home that he can manage well. Bandage on R great toe; necrotic areas L 3rd toe.    Posture    Posture Protracted shoulders;Forward head position   Range of Motion (ROM)   ROM Comment WFL--hip flexion limited by abdominal girth   Strength   Strength Comments hip flexion 3+/5; knee ext 4/5.   Bed Mobility   Bed Mobility Comments SBA sup>sit; min A for LE sup>sit   Transfer Skills   Transfer Comments SBA sit<>stand--pushes from bed   Gait   Gait Comments amb with ww 130 ft; SBA; widened stance with decreased step length. Protective footwear on.   Balance   Balance Comments good with UE support in standing   Sensory Examination   Sensory Perception Comments reports neuropathy on dorsum and balls of feet, toes; can feel light touch remainder of LE   Coordination   Coordination no deficits were identified   Coordination Comments with functional mobility   Muscle Tone   Muscle Tone no deficits were identified   General  Therapy Interventions   Planned Therapy Interventions bed mobility training;ADL retraining;gait training;groups;manual therapy;neuromuscular re-education;transfer training;strengthening;home program guidelines;progressive activity/exercise   Clinical Impression   Criteria for Skilled Therapeutic Intervention yes, treatment indicated   PT Diagnosis decreased functional mobility   Influenced by the following impairments weakness, decreased activity tolerance, impaired skin feet   Functional limitations due to impairments bed mobility, transfers, gait below baseline   Clinical Presentation Evolving/Changing   Clinical Presentation Rationale clinical judgment of mobility; comorbidities, participation limitation   Clinical Decision Making (Complexity) Moderate complexity   Therapy Frequency` (6 days/wk)   Predicted Duration of Therapy Intervention (days/wks) 4/1/19   Anticipated Equipment Needs at Discharge (has his ww here)   Anticipated Discharge Disposition Home with Home Therapy   Risk & Benefits of therapy have been explained Yes   Patient, Family & other staff in agreement with plan of care Yes   Clinical Impression Comments pt with sepsis and MICHELLE, bilateral great toe osteomyelitis. Previously IND with ww or furniture for mobility. SBA with transfer/gait with ww but needing min A for bed mobility at this time. Shows good potential to improve with skilled PT to address impairments; return home with ww and home care.   Therapy Certification   Start of care date 03/23/19   Certification date from 03/23/19   Certification date to 04/19/19   Medical Diagnosis sepsis and MICHELLE.   Certification I certify the need for these services furnished under this plan of treatment and while under my care.  (Physician co-signature of this document indicates review and certification of the therapy plan).    Total Evaluation Time   Total Evaluation Time (Minutes) 15

## 2019-03-23 NOTE — PLAN OF CARE
OT: patient familiar with TCU setting and therapist. Assessment complete tx started.POC and OT goals discussed and in agreement with patient.

## 2019-03-23 NOTE — CONSULTS
Patient seen and chart reviewed. Note dictated (initial)   He has chronic depression with occasional suicidal ideation; no plan or intent.   May discontinue the sitter. Does not want antidepressants; that's ok   page me at [phone] as needed

## 2019-03-23 NOTE — PLAN OF CARE
Pt pleasant, denies pain. BLE lymphedema wrap intact.  Completed skin assessment. Dressing changed.  Psychiatry visited .  will reschedule. Up with assist. Potassium went up to 3.7. Monitor INR. Monitor weight and fluid intake.       Skin assessment:  1- scar on the left temporal area which is an old biopsy site.  2- Rash under breast area left and right side of chest.  3- Slight rash under abdominal fold.  4- Rash on back with scratch marks.  5- Blanchable redness upper right buttock and coccyx with small red raised areas.  6- Legs covered with lymphedema wraps.  7- Right great toe : old mole site top of toe. Toe bruised with 3 small black areas. Right second toe with friction area from shoe.  8- Left third toe with small black area  at tip of toe.

## 2019-03-23 NOTE — PLAN OF CARE
Juli completed. Pt able to amb 130 ft with ww, SBA but fatigued quickly not able to go on second walk. Pt reports sepsis weakened him but feels he will not need as much time as last time he was here (10/2017--3 wks). Pt glad to see familiar faces here.

## 2019-03-23 NOTE — H&P
[facility]    History and Physical - Hospitalist Service       Date of Admission:  3/22/2019    Assessment & Plan   Clarke Moreira is a 68 year old male with a history of afib on coumadin, HFpEF, DMII, and recent dental (2/26/19) who was admitted to [facility] on 3/6/2019 for sepsis and MICHELLE. Hospital course was complicated by Afib with RVR and orthostatic hypotension.(for further details please refer to previous discharge summary). Transferred to TCU on 3/22/19 for ongoing therapies.     Deconditioning- PT/OT     Sepsis 2/2 maxillary sinusitis following dental extraction, resolved  Afib (CHADVASC 5) s/p ablation (2008) on coumadin. Seen by cardiology while inpatient given episodes of Afib w/ RVR. Attempted to increase dose of metoprolol but unable to due to BP. HR currently controlled.  - Pharmacy to dose coumadin  - Continue metoprolol 100 mg daily  - F/u with Dr. Willoughby outpatient  - Will need 14 day Zio patch on discharge     HFpEF.  HTN.   PTA managed on torsemide, spironolactone and lisinopril. Spironolactone and lisinopril discontinued during hospital admission given MICHELLE. Torsemide dose decreased significantly at [facility] from 300 mg BID PTA to 75 mg BID.  - Continue torsemide  - Continue potassium 40 mg BID  - Pt to follow up in [facility] clinic as outpatient  - Daily weights  - I/Os     Orthostatic Hypotension.  Bilateral legs wrapped. Lisinopril and spironolactone discontinued.   - Up with assist  - Continue to monitor      Pulmonary HTN, WHO group II. Follows with Dr. Villarreal. On diuretics.  - Continue torsemide  - F/u with Dr. Willoughby outpatient     L temple lesion  Hx of BCC  Lesion at site of prior excisional biopsy. Per patient area never fully healed. Path from 2014 c/w BCC.  - Follow up with dermatology OP     DMII. Last Hgb A1C 6.1 on 2/26/19. Metformin held on admission to [facility], resumed on discharge. Not on insulin.   - Resume metformin BID ac (MONITOR Creatinine- discontinue if  develops MICHELLE again)  - Moderate consistent CHO diet  - BG BID  - BMP 3/23, then M/th     Hypothyroidism.  - Continue Levothyroxine     Diabetic foot ulcers.  Hx of PAD.  [facility] 9/24/17 w/ possible occlusion of R anterior tibila artery and dorsalis pedis. Black eschar on great toes on admission. S/p bedside debridement per podiatry at [facility].   - Continue daily dressing changes  - Follow up in Podiatry clinic in 1-2 weeks  - Offloading boots     THONG. Has CPAP at home. Refusing here.     Urinary retention.   - Continue flomax      MICHELLE. Creatinine peaked at 4.15 on admission to [facility]. Thought secondary to dehydration. Resolved with fluids, holding diuretics, and treatment of sepsis.  Creatinine stable.   - BMP M/Th    Suicidal thought, Depression- Chronic in nature - does not appear in immediate threat. Will get [facility] consult      Diet: Moderate Consistent CHO Diet    DVT Prophylaxis: Warfarin  Bazzi Catheter: not present  Code Status: DNR/DNI      Disposition Plan   Expected discharge: as per PT/OT, recommended to prior living arrangement once safe disposition plan/ TCU bed available.  Entered: Kenji Hinds MD, MD 03/23/2019, 10:44 AM     The patient's care was discussed with the Bedside Nurse and Patient.    Kenji Hinds MD, MD  [facility] ([facility])    ______________________________________________________________________    Chief Complaint   Deconditioning after sepsis    History is obtained from the patient  Review of EMR    History of Present Illness   Clarke Moreira is a 68 year old male with a history of afib on coumadin, HFpEF, DMII, and recent dental (2/26/19) who was admitted to [facility] on 3/6/2019 for sepsis and MICHELLE. Hospital course was complicated by Afib with RVR and orthostatic hypotension.(for further details please refer to previous discharge summary). Transferred to TCU on 3/22/19 for ongoing therapies.     Currently says is feeling much better and want to improve his strength and endurance.  Denies Chest pain/ SOB/ cough, Denies any N&V/ bowel problems  Denies urinary problems , Denies fever/chills/rigors     Says at baseline is depressed due to PTSD from living with his mother and has chronic intermittent suicidal thoughts , no plan, no hallucinations. No change recently, says chronic and lives with it.     Review of Systems    The 10 point Review of Systems is negative other than noted in the HPI or here.     Past Medical History    I have reviewed this patient's medical history and updated it with pertinent information if needed.   Past Medical History:   Diagnosis Date     Atrial fibrillation (H)      Basal cell carcinoma      Chronic anticoagulation      Diastolic heart failure (H)      Dyslipidemia      Essential hypertension      Morbid obesity (H)      Sleep-disordered breathing      Type 2 diabetes mellitus (H)      Past Surgical History   I have reviewed this patient's surgical history and updated it with pertinent information if needed.  Past Surgical History:   Procedure Laterality Date     BIOPSY OF SKIN LESION       MOHS MICROGRAPHIC PROCEDURE       PICC INSERTION Right 09/24/2017    5fr TL Bard PICC, 51cm (1cm external) in the R medial brachial vein w/ tip in the SVC.       Social History   I have reviewed this patient's social history and updated it with pertinent information if needed.  Social History     Tobacco Use     Smoking status: Never Smoker     Smokeless tobacco: Never Used   Substance Use Topics     Alcohol use: No     Comment: Former alcohol abuse. Quit 70s then quit later in 80s-present     Drug use: No     Comment: history of LSD use       Family History   I have reviewed this patient's family history and updated it with pertinent information if needed.   Family History   Problem Relation Age of Onset     Depression Mother      Glaucoma Maternal Grandfather      Cancer No family hx of         No family history of skin cancer     Macular Degeneration No family hx of         Prior to Admission Medications   Prior to Admission Medications   Prescriptions Last Dose Informant Patient Reported? Taking?   Ascorbic Acid (VITAMIN C) POWD   No No   Sig: Take 1,000 mcg by mouth daily   WARFARIN NO DOSE TODAY   No No   Si each once for 1 dose   ammonium lactate (LAC-HYDRIN) 12 % external cream   No No   Sig: Apply topically 2 times daily as needed for dry skin   aspirin (ASA) 81 MG tablet   No No   Sig: Take 1 tablet (81 mg) by mouth daily   atorvastatin (LIPITOR) 40 MG tablet   No No   Sig: Take 1 tablet (40 mg) by mouth daily   blood glucose monitoring (ONE TOUCH DELICA) lancets   No No   Sig: Use to test blood sugars 2 times daily or as directed.   blood glucose monitoring (ONE TOUCH ULTRA MINI) meter device kit   No No   Sig: Use to test blood sugars 2 times daily or as directed.   blood glucose monitoring (ONETOUCH ULTRA) test strip   No No   Sig: Use to test blood sugars 2 times daily or as directed.   levothyroxine (SYNTHROID/LEVOTHROID) 175 MCG tablet   No No   Sig: Take 1 tablet (175 mcg) by mouth every morning (before breakfast)   metFORMIN (GLUCOPHAGE) 500 MG tablet   No No   Sig: Take 1 tablet (500 mg) by mouth 2 times daily (with meals)   metoprolol succinate ER (TOPROL-XL) 100 MG 24 hr tablet   No No   Sig: Take 1 tablet (100 mg) by mouth daily   multivitamin w/minerals (MULTI-VITAMIN) tablet   No No   Sig: Take 1 tablet by mouth daily   nystatin (MYCOSTATIN) 521245 UNIT/GM external cream   No No   Sig: Apply topically 2 times daily as needed for dry skin   omega 3 1000 MG CAPS   No No   Sig: Take 1 g by mouth daily   order for DME   No No   Sig: Equipment being ordered: Other: Velcro Compression stockings.   Treatment Diagnosis: bilateral lymphedema.   order for DME   No No   Sig: Equipment being ordered: Bariatric Lift chair  Diagnosis - morbid obesity, CHF, Lymphedema    Fax to Ne from [facility] at [phone].   polyethylene glycol (MIRALAX/GLYCOLAX) packet   No  No   Sig: Take 17 g by mouth daily as needed for constipation   potassium chloride ER (K-DUR/KLOR-CON M) 20 MEQ CR tablet   No No   Sig: Take 2 tablets (40 mEq) by mouth 2 times daily   senna-docusate (SENOKOT-S/PERICOLACE) 8.6-50 MG tablet   No No   Sig: Take 1-2 tablets by mouth 2 times daily as needed for constipation   tamsulosin (FLOMAX) 0.4 MG capsule   No No   Sig: Take 1 capsule (0.4 mg) by mouth daily   torsemide (DEMADEX) 5 MG tablet   No No   Sig: Take 15 tablets (75 mg) by mouth 2 times daily   vitamin B complex with vitamin C (VITAMIN  B COMPLEX) tablet   No No   Sig: Take 1 tablet by mouth daily   vitamin E (VITAMIN E COMPLEX) 1000 units (450 mg) capsule   No No   Sig: Take 1 capsule (1,000 Units) by mouth daily   warfarin (COUMADIN) 10 MG tablet   No No   Sig: Take 1 tablet (10 mg) by mouth once for 1 dose   warfarin (COUMADIN) 5 MG tablet   No No   Sig: Take 2.5 tablets (12.5 mg) by mouth daily      Facility-Administered Medications: None     Allergies   No Known Allergies    Physical Exam   /55 (BP Location: Right arm)   Pulse 91   Temp 96.4  F (35.8  C) (Oral)   Resp 19   Ht 1.829 m (6')   Wt (!) 167.3 kg (368 lb 12.8 oz)   SpO2 96%   BMI 50.02 kg/m    Gen- pleasant  laying on bed  HEENT- NAD, DELFIN  Neck- supple, no JVD elevation  CVS- I+II+ no m/r/g irregular  RS- CTAB  Abdo- soft, no tenderness . No g/r/r   Ext-  edema , ace bandage both LE  CNS- oriented to person/ place/ time.       Data   Data reviewed today: I reviewed all medications, new labs and imaging results over the last 24 hours. I personally reviewed no images or EKG's today.    BMP  Recent Labs   Lab 03/23/19  0624 03/21/19  0600 03/20/19  0607 03/19/19  0532    140 140 140   POTASSIUM 3.1* 3.4 3.2* 3.5   CHLORIDE 100 104 102 102   CHERI 8.7 8.8 8.9 9.0   CO2 30 28 29 30   BUN 22 24 24 23   CR 0.99 0.91 0.97 0.92   * 130* 122* 120*     CBCNo lab results found in last 7 days.  INR  Recent Labs   Lab  03/23/19  0624 03/22/19  0615 03/21/19  0600 03/20/19  1037   INR 1.90* 2.12* 2.13* 2.32*

## 2019-03-23 NOTE — PHARMACY-MEDICATION REGIMEN REVIEW
Pharmacy Medication Regimen Review  Clarke Moreira is a 68 year old male who is currently in the Transitional Care Unit.    Assessment: All medications have an appropriate indications, durations and no unnecessary use was found.  INR goal 2-3  PTA meds lisinopril and spironolactone are on hold due to orthostatic hypotension.      Plan:   Continue current treatment.  Attending provider will be sent this note for review.  If there are any emergent issues noted above, pharmacist will contact provider directly by phone.      Pharmacy will periodically review the resident's medication regimen for any PRN medications not administered in > 72 hours and discontinue them. The pharmacist will discuss gradual dose reductions of psychopharmacologic medications with interdisciplinary team on a regular basis.    Please contact pharmacy if the above does not answer specific medication questions/concerns.    Background:  A pharmacist has reviewed all medications and pertinent medical history today.  Medications were reviewed for appropriate use and any irregularities found are listed with recommendations.      Current Facility-Administered Medications:      [START ON 3/25/2019] - Skin Test Reading -, , Does not apply, Q21 Days, Mi Melendez PA-TERRY     acetaminophen (TYLENOL) Suppository 650 mg, 650 mg, Rectal, Q4H PRN, Mi Melendez, PA-C     acetaminophen (TYLENOL) tablet 650 mg, 650 mg, Oral, Q4H PRN, Mi Melendez PA-TERRY     ammonium lactate (AMLACTIN) 12 % cream, , Topical, BID PRN, Mi Melendez PA-C     [START ON 3/24/2019] aspirin EC tablet 81 mg, 81 mg, Oral, Daily, Kenji Hinds MD     atorvastatin (LIPITOR) tablet 40 mg, 40 mg, Oral, QPM, Kenji Hinds MD     calcium carbonate (TUMS) chewable tablet 1,000 mg, 1,000 mg, Oral, 4x Daily PRN, Mi Melendez PA-TERRY     chlorhexidine (PERIDEX) 0.12 % solution 15 mL, 15 mL, Swish & Spit, BID, Mi Melendez PA-C     [START ON 3/24/2019] fish oil-omega-3  fatty acids capsule 1 g, 1 g, Oral, Daily, Kenji Hinds MD     [START ON 3/24/2019] levothyroxine (SYNTHROID/LEVOTHROID) tablet 175 mcg, 175 mcg, Oral, QAM AC, Kenji Hinds MD     medication instructions, , Does not apply, Continuous PRN, Budge, Mi H, PA-C     metFORMIN (GLUCOPHAGE) tablet 500 mg, 500 mg, Oral, BID w/meals, Kenji Hinds MD     [START ON 3/24/2019] metoprolol succinate ER (TOPROL-XL) 24 hr tablet 100 mg, 100 mg, Oral, Daily, Kenji Hinds MD     [START ON 3/24/2019] multivitamin w/minerals (THERA-VIT-M) tablet 1 tablet, 1 tablet, Oral, Daily, Kenji Hinds MD     Patient is already receiving anticoagulation with heparin, enoxaparin (LOVENOX), warfarin (COUMADIN)  or other anticoagulant medication, , Does not apply, Continuous PRN, Budge, Mi H, PA-C     [START ON 3/24/2019] pneumococcal vaccine (PNEUMOVAX 23-lisa) injection 0.5 mL, 0.5 mL, Intramuscular, Once, Kenji Hinds MD     polyethylene glycol (MIRALAX/GLYCOLAX) Packet 17 g, 17 g, Oral, Daily PRN, Budge, Mi H, PA-C     potassium chloride (KLOR-CON) Packet 20-40 mEq, 20-40 mEq, Oral or Feeding Tube, Q2H PRN, Budge, Mi H, PA-C     potassium chloride 10 mEq in 100 mL intermittent infusion with 10 mg lidocaine, 10 mEq, Intravenous, Q1H PRN, Budge, Mi H, PA-C     potassium chloride 10 mEq in 100 mL sterile water intermittent infusion (premix), 10 mEq, Intravenous, Q1H PRN, Budge, Mi H, PA-C     potassium chloride 20 mEq in 50 mL intermittent infusion, 20 mEq, Intravenous, Q1H PRN, Budge, Mi H, PA-C     potassium chloride ER (K-DUR/KLOR-CON M) CR tablet 20-40 mEq, 20-40 mEq, Oral, Q2H PRN, Budge, Mi H, PA-C     potassium chloride ER (K-DUR/KLOR-CON M) CR tablet 40 mEq, 40 mEq, Oral, BID, Kenji Hinds MD     senna-docusate (SENOKOT-S/PERICOLACE) 8.6-50 MG per tablet 1-2 tablet, 1-2 tablet, Oral, BID PRN, Mi Melendez PA-C     [START ON 3/24/2019] tamsulosin (FLOMAX) capsule 0.4 mg, 0.4 mg,  Oral, Daily, Kenji Hinds MD     torsemide (DEMADEX) tablet 75 mg, 75 mg, Oral, BID, Kenji Hinds MD     tuberculin injection 5 Units, 5 Units, Intradermal, Q21 Days, Mi Melendez PA-C     [START ON 3/24/2019] vitamin B complex with vitamin C (STRESS TAB) tablet 1 tablet, 1 tablet, Oral, Daily, Kenji Hinds MD     [START ON 3/24/2019] vitamin C (ASCORBIC ACID) tablet 500 mg, 500 mg, Oral, Daily, Kenji Hinds MD     [START ON 3/24/2019] vitamin E (TOCOPHEROL) 1000 units (450 mg) capsule 1,000 Units, 1,000 Units, Oral, Daily, Kenji Hinds MD     warfarin (COUMADIN) tablet 15 mg, 15 mg, Oral, ONCE at 18:00, Kenji Hinds MD     Warfarin Therapy Reminder (Check START DATE - warfarin may be starting in the FUTURE), 1 each, Does not apply, Continuous PRN, Mi Melendez PA-C  No current outpatient prescriptions on file.   PMH: Sepsis 2/2 maxillary sinusitis following dental extraction (resolved), Afib (CHADVASC 5) s/p ablation (2008), HFpEF,   HTN, Orthostatic Hypotension, Pulmonary HTN, WHO group II, DM2, Diabetic foot ulcer, Hypothyroidism, PDA, and urinary retention.

## 2019-03-23 NOTE — PROGRESS NOTES
During my attempted visit,pt/family not available.      I will inform on call  tomorrow follow up.

## 2019-03-23 NOTE — PROGRESS NOTES
03/23/19 1100   Quick Adds   Quick Adds Certification   Type of Visit Initial Occupational Therapy Evaluation   Living Environment   Lives With alone   Living Arrangements apartment   Home Accessibility no concerns   Transportation Anticipated agency   Self-Care   Usual Activity Tolerance moderate   Current Activity Tolerance poor   Regular Exercise Yes   Activity/Exercise Type strength training   Exercise Amount/Frequency 2 times/wk   Equipment Currently Used at Home grab bar, toilet;grab bar, tub/shower;hospital bed;walker, rolling;other (see comments)   Activity/Exercise/Self-Care Comment OT baseline independence with BADL FX mobility with 2ww, starting to need assist with IADL such as cleaning   Functional Level   Ambulation 1-->assistive equipment   Transferring 1-->assistive equipment   Toileting 1-->assistive equipment   Bathing 1-->assistive equipment   Dressing 1-->assistive equipment   Eating 0-->independent   Communication 0-->understands/communicates without difficulty   Swallowing 0-->swallows foods/liquids without difficulty   Cognition 0 - no cognition issues reported   Fall history within last six months no   General Information   Onset of Illness/Injury or Date of Surgery - Date 02/26/19   Referring Physician gigi   Patient/Family Goals Statement improve strength and mobility   Additional Occupational Profile Info/Pertinent History of Current Problem orthostatis hypotension prior CVA  A fIB   Precautions/Limitations fall precautions   Weight-Bearing Status - LUE full weight-bearing   Weight-Bearing Status - RUE full weight-bearing   Weight-Bearing Status - LLE full weight-bearing   Weight-Bearing Status - RLE full weight-bearing   Heart Disease Risk Factors Lack of physical activity;Overweight;Stress;Family history;Medical history;Gender;Age   General Observations OT patient appears to have exagerated perceptive of his level of immobility   Cognitive Status Examination   Orientation orientation to  person, place and time   Level of Consciousness alert   Follows Commands (Cognition) WNL   Attention No deficits were identified   Cognitive Comment OT will continue to observe during functional activity   Visual Perception   Visual Perception Wears glasses   Sensory Examination   Sensory Comments WFL   Pain Assessment   Patient Currently in Pain Yes, see Vital Sign flowsheet   Integumentary/Edema   Integumentary/Edema Comments baseline bilateral LE edema   Posture   Posture Comments OT influenced by weight and R shoulder issues    Range of Motion (ROM)   ROM Comment WFL   Strength   Strength Comments grossly 3+/5 strength grade with limited endurance   Hand Strength   Hand Strength Comments WFL   Muscle Tone Assessment   Muscle Tone Comments bilateral LE edema   Coordination   Coordination Comments WFL   Mobility   Bed Mobility Comments OT has hospital bed at home   Transfer Skills   Transfer Comments OT has 2ww baseline   Toilet Transfer   Toilet Transfer Comments OT has tub grab bar and vanity to assist  at home   Transfer Skill: Toilet Transfer   Level of [place]: Toilet moderate assist (50% patients effort)   Physical Assist/Nonphysical Assist: Toilet set-up required;supervision;verbal cues;1 person assist   Weight-Bearing Restrictions: Toilet full weight-bearing   Assistive Device rolling walker;seat riser;grab bars   Toilet Transfer Skill Comments OT moderate assist 2ww verbal cue grab bar tall toilet 2ww   Balance   Balance Comments OT fair, willcontinue to monitor   Upper Body Dressing   Level of [place]: Dress Upper Body moderate assist (50% patients effort)   Physical Assist/Nonphysical Assist: Dress Upper Body set-up required;supervision;verbal cues;1 person assist   Lower Body Dressing   Level of [place]: Dress Lower Body dependent (less than 25% patients effort)   Physical Assist/Nonphysical Assist: Dress Lower Body set-up required;supervision;verbal cues;1 person assist   Eating/Self  Feeding   Level of [place]: Eating independent   Instrumental Activities of Daily Living (IADL)   Previous Responsibilities meal prep;housekeeping;laundry;shopping;medication management;finances   Activities of Daily Living Analysis   Impairments Contributing to Impaired Activities of Daily Living balance impaired;fear and anxiety;flexibility decreased;muscle tone abnormal;pain;strength decreased   General Therapy Interventions   Planned Therapy Interventions ADL retraining;IADL retraining;balance training;bed mobility training;strengthening;transfer training;home program guidelines;progressive activity/exercise   Clinical Impression   Criteria for Skilled Therapeutic Interventions Met yes, treatment indicated   OT Diagnosis deconditioning   Influenced by the following impairments pain, psych history, previous CVA, R shoulder issues   Assessment of Occupational Performance 5 or more Performance Deficits   Identified Performance Deficits OT patient below baseline in BADL iADL FX mobility strength endurance balance   Clinical Decision Making (Complexity) High complexity   Therapy Frequency daily   Predicted Duration of Therapy Intervention (days/wks) OT goals to be met 4/6/19   Anticipated Discharge Disposition Home with Home Therapy   Risks and Benefits of Treatment have been explained. Yes   Patient, Family & other staff in agreement with plan of care Yes   Therapy Certification   Start of Care Date 03/23/19   Certification date from 03/23/19   Certification date to 04/23/19   Medical Diagnosis sepsis   Certification I certify the need for these services furnished under this plan of treatment and while under my care.  (Physician co-signature of this document indicates review and certification of the therapy plan).   Total Evaluation Time   Total Evaluation Time (Minutes) 15

## 2019-03-23 NOTE — H&P
I have the reviewed the H&P by Dr. Hinds dated 3/23/19 with the following updates and changes:    - Suicidal though, depression: chronic in nature  - Psych consult  - Hypokalemia, placed on replacement protocol    JOHANA Hickey-C  Hospitalist Service  [phone]

## 2019-03-23 NOTE — PLAN OF CARE
Pt alert and oriented. Denies pain, denies SOB. Able to make needs known. Utilizes urinal at bedside, staff empties. BLE lymphedema wraps intact. SBA w/ walker. Call light within reach. Continue to w/ POC.

## 2019-03-23 NOTE — PLAN OF CARE
Alert and oriented x 4. C/o pain BLE pt refused to take pain medication. Good appetite and fluid intake. Voided good using urinal and had a BM. At bedtime asked pt if he had suicidal thoughts pt said yes more often when I am by myself. Asked him if he has any plan, pt denied. Around 2200 pt denied having suicidal thoughts. Left sticky note to providers and order spiritual consult. Around bedtime pt was c/o feeling hot and he was sweating B/G was 103.

## 2019-03-23 NOTE — PROGRESS NOTES
" 03/23/19 0802   Quick Adds   Quick Adds Certification   Type of Visit Initial PT Evaluation   Living Environment   Lives With alone   Living Arrangements apartment   Home Accessibility no concerns   Transportation Anticipated agency  ([employer])   Living Environment Comment no [place]INITY--elevator, has a tub shower in his bathroom but has not used in last few months due to not feeling safe with use. pt has home RN 2x/week, wound care. pt reports cooking and cleaning on own but these are becoming more difficult   Self-Care   Usual Activity Tolerance fair   Current Activity Tolerance fair   Regular Exercise Yes   Activity/Exercise Type strength training   Exercise Amount/Frequency 2 times/wk  (ex from OT/PT)   Equipment Currently Used at Home hospital bed;grab bar, toilet;walker, rolling   Activity/Exercise/Self-Care Comment typically able to furniture walk and walk with FWW, transfers and performs toileting with grab bar; completes sponge baths due to small size of shower. Has a bariatric lift chair at home   Functional Level Prior   Ambulation 1-->assistive equipment   Transferring 1-->assistive equipment   Toileting 1-->assistive equipment   Bathing 1-->assistive equipment   Communication 0-->understands/communicates without difficulty   Swallowing 0-->swallows foods/liquids without difficulty   Cognition 0 - no cognition issues reported   Fall history within last six months no   Which of the above functional risks had a recent onset or change? ambulation;transferring   Prior Functional Level Comment IND with mobility with WW or \"furniture walk\"   General Information   Onset of Illness/Injury or Date of Surgery - Date 03/06/19   Referring Physician Mi Melendez PA-C; Dr Luis Nova   Patient/Family Goals Statement walk to be able to get back to apt--needs to go 100--150 ft to elevator   Pertinent History of Current Problem (include personal factors and/or comorbidities that impact the POC) 68 year old male " with PMH of afib on warfarin, HFpEF, T2DM, and recent dental work (2/26/19) who is admitted on 3/6/2019 with severe sepsis and MICHELLE. He in clinically stable, lactic acidosis improved following fluids. He was found to have bilateral great toe osteomyelitis as well as left maxiallary sinusitis. Started on broad spectrum antibiotics.    Heart Disease Risk Factors High blood pressure;Gender;Age;Medical history   General Observations pt lying in bed; agreeable to PT   General Info Comments pt reports uses 2L O2 at home when sleeping and sitting but is able to maintain saturation with activity. Reports L ankle started bothering a few days ago when wraps were left off.   Cognitive Status Examination   Orientation orientation to person, place and time   Level of Consciousness alert   Follows Commands and Answers Questions 100% of the time   Personal Safety and Judgment intact   Memory intact   Pain Assessment   Patient Currently in Pain (R ankle)   Integumentary/Edema   Integumentary/Edema Comments has GCB on LE--pt reports has velcro garment uses at home that he can manage well. Bandage on R great toe; necrotic areas L 3rd toe.    Posture    Posture Protracted shoulders;Forward head position   Range of Motion (ROM)   ROM Comment WFL--hip flexion limited by abdominal girth   Strength   Strength Comments hip flexion 3+/5; knee ext 4/5.   Bed Mobility   Bed Mobility Comments SBA sup>sit; min A for LE sit>sup   Transfer Skills   Transfer Comments SBA sit<>stand--pushes from bed   Gait   Gait Comments amb with ww 130 ft; SBA; widened stance with decreased step length. Protective footwear on.   Balance   Balance Comments good with UE support in standing   Sensory Examination   Sensory Perception Comments reports neuropathy on dorsum and balls of feet, toes; can feel light touch remainder of LE   Coordination   Coordination no deficits were identified   Coordination Comments with functional mobility   Muscle Tone   Muscle Tone no  deficits were identified   General Therapy Interventions   Planned Therapy Interventions bed mobility training;ADL retraining;gait training;groups;manual therapy;neuromuscular re-education;transfer training;strengthening;home program guidelines;progressive activity/exercise   Clinical Impression   Criteria for Skilled Therapeutic Intervention yes, treatment indicated   PT Diagnosis decreased functional mobility   Influenced by the following impairments weakness, decreased activity tolerance, impaired skin feet   Functional limitations due to impairments bed mobility, transfers, gait below baseline   Clinical Presentation Evolving/Changing   Clinical Presentation Rationale clinical judgment of mobility; comorbidities, participation limitation   Clinical Decision Making (Complexity) Moderate complexity   Therapy Frequency` (6 days/wk)   Predicted Duration of Therapy Intervention (days/wks) 4/1/19   Anticipated Equipment Needs at Discharge (has his ww here)   Anticipated Discharge Disposition Home with Home Therapy   Risk & Benefits of therapy have been explained Yes   Patient, Family & other staff in agreement with plan of care Yes   Clinical Impression Comments pt with sepsis and MICHELLE, bilateral great toe osteomyelitis. Previously IND with ww or furniture for mobility. SBA with transfer/gait with ww but needing min A for bed mobility at this time. Shows good potential to improve with skilled PT to address impairments; return home with ww and home care.   Therapy Certification   Start of care date 03/23/19   Certification date from 03/23/19   Certification date to 04/19/19   Medical Diagnosis sepsis and MICHELLE.   Certification I certify the need for these services furnished under this plan of treatment and while under my care.  (Physician co-signature of this document indicates review and certification of the therapy plan).    Total Evaluation Time   Total Evaluation Time (Minutes) 15

## 2019-03-24 LAB
GLUCOSE BLDC GLUCOMTR-MCNC: 125 MG/DL (ref 70–99)
GLUCOSE BLDC GLUCOMTR-MCNC: 128 MG/DL (ref 70–99)
INR PPP: 1.99 (ref 0.86–1.14)

## 2019-03-24 PROCEDURE — 25000132 ZZH RX MED GY IP 250 OP 250 PS 637: Performed by: PHYSICIAN ASSISTANT

## 2019-03-24 PROCEDURE — 00000146 ZZHCL STATISTIC GLUCOSE BY METER IP

## 2019-03-24 PROCEDURE — 25000128 H RX IP 250 OP 636: Performed by: INTERNAL MEDICINE

## 2019-03-24 PROCEDURE — 12000022 ZZH R&B SNF

## 2019-03-24 PROCEDURE — 90732 PPSV23 VACC 2 YRS+ SUBQ/IM: CPT | Performed by: INTERNAL MEDICINE

## 2019-03-24 PROCEDURE — 85610 PROTHROMBIN TIME: CPT | Performed by: PHYSICIAN ASSISTANT

## 2019-03-24 PROCEDURE — 25000132 ZZH RX MED GY IP 250 OP 250 PS 637: Performed by: INTERNAL MEDICINE

## 2019-03-24 PROCEDURE — 36415 COLL VENOUS BLD VENIPUNCTURE: CPT | Performed by: PHYSICIAN ASSISTANT

## 2019-03-24 RX ORDER — WARFARIN SODIUM 7.5 MG/1
15 TABLET ORAL
Status: COMPLETED | OUTPATIENT
Start: 2019-03-24 | End: 2019-03-24

## 2019-03-24 RX ADMIN — TORSEMIDE 75 MG: 20 TABLET ORAL at 08:46

## 2019-03-24 RX ADMIN — CHLORHEXIDINE GLUCONATE 0.12% ORAL RINSE 15 ML: 1.2 LIQUID ORAL at 08:43

## 2019-03-24 RX ADMIN — OXYCODONE HYDROCHLORIDE AND ACETAMINOPHEN 500 MG: 500 TABLET ORAL at 08:45

## 2019-03-24 RX ADMIN — LEVOTHYROXINE SODIUM 175 MCG: 175 TABLET ORAL at 06:08

## 2019-03-24 RX ADMIN — Medication 1 G: at 08:45

## 2019-03-24 RX ADMIN — WARFARIN SODIUM 15 MG: 7.5 TABLET ORAL at 17:15

## 2019-03-24 RX ADMIN — METOPROLOL SUCCINATE 100 MG: 100 TABLET, EXTENDED RELEASE ORAL at 08:46

## 2019-03-24 RX ADMIN — ATORVASTATIN CALCIUM 40 MG: 20 TABLET, FILM COATED ORAL at 20:28

## 2019-03-24 RX ADMIN — ASPIRIN 81 MG: 81 TABLET, COATED ORAL at 08:46

## 2019-03-24 RX ADMIN — TAMSULOSIN HYDROCHLORIDE 0.4 MG: 0.4 CAPSULE ORAL at 08:44

## 2019-03-24 RX ADMIN — PNEUMOCOCCAL VACCINE POLYVALENT 0.5 ML
25; 25; 25; 25; 25; 25; 25; 25; 25; 25; 25; 25; 25; 25; 25; 25; 25; 25; 25; 25; 25; 25; 25 INJECTION, SOLUTION INTRAMUSCULAR; SUBCUTANEOUS at 14:45

## 2019-03-24 RX ADMIN — METFORMIN HYDROCHLORIDE 500 MG: 500 TABLET ORAL at 08:45

## 2019-03-24 RX ADMIN — MULTIPLE VITAMINS W/ MINERALS TAB 1 TABLET: TAB at 08:45

## 2019-03-24 RX ADMIN — METFORMIN HYDROCHLORIDE 500 MG: 500 TABLET ORAL at 17:15

## 2019-03-24 RX ADMIN — TORSEMIDE 75 MG: 20 TABLET ORAL at 17:12

## 2019-03-24 RX ADMIN — B-COMPLEX W/ C & FOLIC ACID TAB 1 TABLET: TAB at 08:44

## 2019-03-24 RX ADMIN — Medication 1000 UNITS: at 08:45

## 2019-03-24 RX ADMIN — POTASSIUM CHLORIDE 40 MEQ: 750 TABLET, EXTENDED RELEASE ORAL at 08:44

## 2019-03-24 RX ADMIN — CHLORHEXIDINE GLUCONATE 0.12% ORAL RINSE 15 ML: 1.2 LIQUID ORAL at 20:27

## 2019-03-24 RX ADMIN — POTASSIUM CHLORIDE 40 MEQ: 750 TABLET, EXTENDED RELEASE ORAL at 20:27

## 2019-03-24 ASSESSMENT — MIFFLIN-ST. JEOR: SCORE: 2494.93

## 2019-03-24 NOTE — PLAN OF CARE
Pt alert and oriented. Denies pain, denies SOB. BP reassessed, BP= 114/67 @ 0030. Utilizes urinal at bedside, staff empties. BLE lymphedema wraps intact. SBA w/ walker. Able to make needs known. Call light within reach. Continue to w/ POC

## 2019-03-24 NOTE — PROGRESS NOTES
Pt given care plan goals and orders. There were no questions at this time, writer indicated to the pt to reach out if he had questions. SW will continue to follow and assist as needed.     AARON Espinoza, [facility]

## 2019-03-24 NOTE — PLAN OF CARE
Care plan completed. Full skin assessment completed. Lymphedema wraps removed and skin check done-skin looks good- intact. Minor edema. The rest of skin assessment was completed yesterday. Rash still apparent on back. Dressing change done to right great toe. Appetite is good. Continent of bowel and bladder. Weight done this morning. BP checked before giving torsemide and discussed with PA. Denies pain. Very talkative and pleasant.

## 2019-03-24 NOTE — PROGRESS NOTES
"SPIRITUAL HEALTH SERVICES  SPIRITUAL ASSESSMENT Progress Note  [facility] ([facility]CU   ON-CALL VISIT    REFERRAL SOURCE: Epic consult order triaged for on-call     Reviewed documentation and visited patient, Clarke Moreira. Clarke began by indicating that he had \"a moral question.\" He was concerned about an incident that he witnessed while he was on Unit 5A of the main [facility] hospital, involving the patient that was sharing his room (Clarke was not directly involved but observed the incident). After listening to Clarke's account of what happened, I encouraged him to contact Patient Relations, as they have staff who are specially trained to evaluate these types of concerns. Clarke was able to locate the phone number for Patient Relations on the white board in his room and said that he would contact them.    PLAN: No follow-up planned. The unit  will be advised of this visit.    Nikos Bowden  Chaplain Resident  Pager [phone]    "

## 2019-03-24 NOTE — PLAN OF CARE
Alert and oriented x 4. Good appetite and fluid intake. Denied having pain and SOB. Voided good using urinal. At bedtime his B/P was 75/41 held torsemide 75mg and encouraged fluid and it went up to 102/ 52. Pt was asymptomatic.

## 2019-03-25 LAB
ANION GAP SERPL CALCULATED.3IONS-SCNC: 9 MMOL/L (ref 3–14)
BUN SERPL-MCNC: 18 MG/DL (ref 7–30)
CALCIUM SERPL-MCNC: 8.4 MG/DL (ref 8.5–10.1)
CHLORIDE SERPL-SCNC: 101 MMOL/L (ref 94–109)
CO2 SERPL-SCNC: 30 MMOL/L (ref 20–32)
CREAT SERPL-MCNC: 0.85 MG/DL (ref 0.66–1.25)
GFR SERPL CREATININE-BSD FRML MDRD: 89 ML/MIN/{1.73_M2}
GLUCOSE BLDC GLUCOMTR-MCNC: 111 MG/DL (ref 70–99)
GLUCOSE BLDC GLUCOMTR-MCNC: 121 MG/DL (ref 70–99)
GLUCOSE SERPL-MCNC: 120 MG/DL (ref 70–99)
INR PPP: 2.14 (ref 0.86–1.14)
POTASSIUM SERPL-SCNC: 3.1 MMOL/L (ref 3.4–5.3)
POTASSIUM SERPL-SCNC: 3.3 MMOL/L (ref 3.4–5.3)
SODIUM SERPL-SCNC: 140 MMOL/L (ref 133–144)

## 2019-03-25 PROCEDURE — 97535 SELF CARE MNGMENT TRAINING: CPT | Mod: GO | Performed by: STUDENT IN AN ORGANIZED HEALTH CARE EDUCATION/TRAINING PROGRAM

## 2019-03-25 PROCEDURE — 36415 COLL VENOUS BLD VENIPUNCTURE: CPT | Performed by: INTERNAL MEDICINE

## 2019-03-25 PROCEDURE — 97530 THERAPEUTIC ACTIVITIES: CPT | Mod: GP

## 2019-03-25 PROCEDURE — 97110 THERAPEUTIC EXERCISES: CPT | Mod: GO | Performed by: STUDENT IN AN ORGANIZED HEALTH CARE EDUCATION/TRAINING PROGRAM

## 2019-03-25 PROCEDURE — 97110 THERAPEUTIC EXERCISES: CPT | Mod: GP

## 2019-03-25 PROCEDURE — 12000022 ZZH R&B SNF

## 2019-03-25 PROCEDURE — 80048 BASIC METABOLIC PNL TOTAL CA: CPT | Performed by: PHYSICIAN ASSISTANT

## 2019-03-25 PROCEDURE — 25000132 ZZH RX MED GY IP 250 OP 250 PS 637: Performed by: INTERNAL MEDICINE

## 2019-03-25 PROCEDURE — 25000132 ZZH RX MED GY IP 250 OP 250 PS 637: Performed by: PHYSICIAN ASSISTANT

## 2019-03-25 PROCEDURE — 84132 ASSAY OF SERUM POTASSIUM: CPT | Performed by: INTERNAL MEDICINE

## 2019-03-25 PROCEDURE — 85610 PROTHROMBIN TIME: CPT | Performed by: PHYSICIAN ASSISTANT

## 2019-03-25 PROCEDURE — 97116 GAIT TRAINING THERAPY: CPT | Mod: GP

## 2019-03-25 PROCEDURE — 36415 COLL VENOUS BLD VENIPUNCTURE: CPT | Performed by: PHYSICIAN ASSISTANT

## 2019-03-25 PROCEDURE — 00000146 ZZHCL STATISTIC GLUCOSE BY METER IP

## 2019-03-25 RX ORDER — WARFARIN SODIUM 7.5 MG/1
15 TABLET ORAL
Status: COMPLETED | OUTPATIENT
Start: 2019-03-25 | End: 2019-03-25

## 2019-03-25 RX ADMIN — ASPIRIN 81 MG: 81 TABLET, COATED ORAL at 08:39

## 2019-03-25 RX ADMIN — METFORMIN HYDROCHLORIDE 500 MG: 500 TABLET ORAL at 17:58

## 2019-03-25 RX ADMIN — MULTIPLE VITAMINS W/ MINERALS TAB 1 TABLET: TAB at 08:38

## 2019-03-25 RX ADMIN — METOPROLOL SUCCINATE 100 MG: 100 TABLET, EXTENDED RELEASE ORAL at 08:39

## 2019-03-25 RX ADMIN — CHLORHEXIDINE GLUCONATE 0.12% ORAL RINSE 15 ML: 1.2 LIQUID ORAL at 21:53

## 2019-03-25 RX ADMIN — TORSEMIDE 75 MG: 20 TABLET ORAL at 16:13

## 2019-03-25 RX ADMIN — TAMSULOSIN HYDROCHLORIDE 0.4 MG: 0.4 CAPSULE ORAL at 08:38

## 2019-03-25 RX ADMIN — WARFARIN SODIUM 15 MG: 7.5 TABLET ORAL at 17:57

## 2019-03-25 RX ADMIN — ATORVASTATIN CALCIUM 40 MG: 20 TABLET, FILM COATED ORAL at 21:53

## 2019-03-25 RX ADMIN — POTASSIUM CHLORIDE 20 MEQ: 750 TABLET, EXTENDED RELEASE ORAL at 17:57

## 2019-03-25 RX ADMIN — Medication 1 G: at 08:38

## 2019-03-25 RX ADMIN — POTASSIUM CHLORIDE 40 MEQ: 750 TABLET, EXTENDED RELEASE ORAL at 08:39

## 2019-03-25 RX ADMIN — TORSEMIDE 75 MG: 20 TABLET ORAL at 08:40

## 2019-03-25 RX ADMIN — CHLORHEXIDINE GLUCONATE 0.12% ORAL RINSE 15 ML: 1.2 LIQUID ORAL at 08:37

## 2019-03-25 RX ADMIN — B-COMPLEX W/ C & FOLIC ACID TAB 1 TABLET: TAB at 08:38

## 2019-03-25 RX ADMIN — LEVOTHYROXINE SODIUM 175 MCG: 175 TABLET ORAL at 05:46

## 2019-03-25 RX ADMIN — POTASSIUM CHLORIDE 40 MEQ: 750 TABLET, EXTENDED RELEASE ORAL at 15:42

## 2019-03-25 RX ADMIN — POTASSIUM CHLORIDE 40 MEQ: 750 TABLET, EXTENDED RELEASE ORAL at 21:54

## 2019-03-25 RX ADMIN — OXYCODONE HYDROCHLORIDE AND ACETAMINOPHEN 500 MG: 500 TABLET ORAL at 08:39

## 2019-03-25 RX ADMIN — METFORMIN HYDROCHLORIDE 500 MG: 500 TABLET ORAL at 08:39

## 2019-03-25 RX ADMIN — POTASSIUM CHLORIDE 40 MEQ: 750 TABLET, EXTENDED RELEASE ORAL at 23:01

## 2019-03-25 RX ADMIN — Medication 1000 UNITS: at 08:38

## 2019-03-25 ASSESSMENT — MIFFLIN-ST. JEOR: SCORE: 2491.75

## 2019-03-25 NOTE — PLAN OF CARE
"Pt is able to transfer and ambulate with a walker and assist of 1. Complained of discomfort on the left ankle when standing for a period of time. Pt reported that the pain started about 2 weeks ago while at the acute care unit, declined the need for pain medication. Lymphedema wraps are in place, refused to have them removed and re-applied, stated \"We are trying to make then stay on for 2 two days now, they were wrapped yesterday\". Dressing change to the right great toes wound after cleansing, no drainage noted on the site. Pt has F/U orthopedic appointment on Tuesday.  Pleasant and cooperative with cares. On Potassium protocol. Potassium result of 3.3 today, pt is expected to receive total of Potassium 60 MEq today and re-check levels in 4 hours and in the morning. Blanchable redness noted on the right rectal folds. Will continue to assess.   "

## 2019-03-25 NOTE — PLAN OF CARE
PT: Session spent w/ LE exercises and ambulation to improve activity tolerance and functional strength, pt ambulating a total of 150' x2 throughout session w/ FWW and CGA.

## 2019-03-25 NOTE — PLAN OF CARE
Good appetite and fluid intake. Voided good using urinal no BM. Ambulated on hallway using a  walker SBA. Blanchable redness noted on coccyx area.

## 2019-03-25 NOTE — PLAN OF CARE
Pt alert and oriented. Able to make needs known. SBA w/ walker. Denies pain, denies SOB. Uses urinal at bedside, staff empties. No new concerns. Call light within reach. Continue w/ POC.

## 2019-03-25 NOTE — PROGRESS NOTES
Chief Complaint   Patient presents with     RECHECK     Diabetic foot care. 10 week follow up. Wound, right hallux. Previously using medihoney, now using betadine. Patient is requesting for it to be changed back to medihoney.      Medication Problem     Patient stated that he is taking the same medication but some doses have changed.           No Known Allergies      Subjective: Clarke is a 68 year old male who presents to the clinic today for a diabetic foot exam and management. He relates that he is currently in the [facility] after a hospital stay 2/2 sepsis in his maxillary sinus after a dental extraction. His stay was complicated by a.fib. Relates that he is regaining his strength. TCU has been covering the right hallux with Medihoney and a DSD. Relates no pain in the feet. He is wearing lymphedema wraps to BL legs. Has finished abx.      Objective  DP and PT pulses cannot be palpated.  Diminished pedal hair.    Protective sensation diminished.  He does relate some numbness in the bottom of both feet.  Nails are thickened, elongated, yellow, brittle, with subungual debris debris consistent with onychomycosis ×10. Left lateral nail is ingrown with no s/s of infection, but does have sanguinous drainage again. This also had some scant bleeding with debridement.  There is some thickening to the right distal Achilles, with pain noted with palpation of this area.   A wound is noted at right  dital hallux measuring 0.3cm x 0.1cm x 0.3cm        .    Sosa Classification: 2    Wound base: Not Visible    Edges: hyperkeratotic    Drainage: scant/serosanguinous    Odor: no    Undermining: no    Bone Exposure: No    Clinical Signs of Infection: No    After obtaining patient consent, the wound was irrigated with copious amounts of saline. A scalpel was then used to debride the wound into dermal tissue. The wound edges were debrided back to healthy, bleeding tissue. The wound base exhibited healthy bleeding. Given the  patient's lack of sensation, no anesthesia was necessary for the procedure.           Assessment: Type 2 diabetes with neuropathy.    Onychomycosis ×10.    Lymphedema  Right hallux ulceration.          Plan:  - Pt seen and evaluated.  - Nails were debrided x 10.  - Right hallux ulcer was sharply debrided as described.   - Orders to continue daily changes.  - See again in 1 week or sooner if problems arise.

## 2019-03-26 ENCOUNTER — OFFICE VISIT (OUTPATIENT)
Dept: ORTHOPEDICS | Facility: CLINIC | Age: 69
End: 2019-03-26
Payer: COMMERCIAL

## 2019-03-26 DIAGNOSIS — I87.8 VENOUS STASIS: ICD-10-CM

## 2019-03-26 DIAGNOSIS — B35.1 OM (ONYCHOMYCOSIS): ICD-10-CM

## 2019-03-26 DIAGNOSIS — E11.65 TYPE 2 DIABETES MELLITUS WITH HYPERGLYCEMIA, WITHOUT LONG-TERM CURRENT USE OF INSULIN (H): Primary | ICD-10-CM

## 2019-03-26 DIAGNOSIS — I73.9 PAD (PERIPHERAL ARTERY DISEASE) (H): ICD-10-CM

## 2019-03-26 DIAGNOSIS — L97.512 SKIN ULCER OF RIGHT GREAT TOE WITH FAT LAYER EXPOSED (H): ICD-10-CM

## 2019-03-26 LAB
GLUCOSE BLDC GLUCOMTR-MCNC: 113 MG/DL (ref 70–99)
GLUCOSE BLDC GLUCOMTR-MCNC: 98 MG/DL (ref 70–99)
INR PPP: 2.31 (ref 0.86–1.14)
POTASSIUM SERPL-SCNC: 4 MMOL/L (ref 3.4–5.3)

## 2019-03-26 PROCEDURE — 12000022 ZZH R&B SNF

## 2019-03-26 PROCEDURE — 97110 THERAPEUTIC EXERCISES: CPT | Mod: GP

## 2019-03-26 PROCEDURE — 97530 THERAPEUTIC ACTIVITIES: CPT | Mod: GP

## 2019-03-26 PROCEDURE — 84132 ASSAY OF SERUM POTASSIUM: CPT | Performed by: PHYSICIAN ASSISTANT

## 2019-03-26 PROCEDURE — 25000132 ZZH RX MED GY IP 250 OP 250 PS 637: Performed by: PHYSICIAN ASSISTANT

## 2019-03-26 PROCEDURE — 85610 PROTHROMBIN TIME: CPT | Performed by: PHYSICIAN ASSISTANT

## 2019-03-26 PROCEDURE — 00000146 ZZHCL STATISTIC GLUCOSE BY METER IP

## 2019-03-26 PROCEDURE — 97530 THERAPEUTIC ACTIVITIES: CPT | Mod: GO | Performed by: OCCUPATIONAL THERAPIST

## 2019-03-26 PROCEDURE — 25000132 ZZH RX MED GY IP 250 OP 250 PS 637

## 2019-03-26 PROCEDURE — 25000132 ZZH RX MED GY IP 250 OP 250 PS 637: Performed by: INTERNAL MEDICINE

## 2019-03-26 PROCEDURE — 97110 THERAPEUTIC EXERCISES: CPT | Mod: GO | Performed by: OCCUPATIONAL THERAPIST

## 2019-03-26 PROCEDURE — 99309 SBSQ NF CARE MODERATE MDM 30: CPT | Performed by: PHYSICIAN ASSISTANT

## 2019-03-26 PROCEDURE — 97535 SELF CARE MNGMENT TRAINING: CPT | Mod: GO | Performed by: OCCUPATIONAL THERAPIST

## 2019-03-26 PROCEDURE — 36415 COLL VENOUS BLD VENIPUNCTURE: CPT | Performed by: PHYSICIAN ASSISTANT

## 2019-03-26 RX ORDER — WARFARIN SODIUM 5 MG/1
10 TABLET ORAL
Status: COMPLETED | OUTPATIENT
Start: 2019-03-26 | End: 2019-03-26

## 2019-03-26 RX ADMIN — CHLORHEXIDINE GLUCONATE 0.12% ORAL RINSE 15 ML: 1.2 LIQUID ORAL at 08:58

## 2019-03-26 RX ADMIN — TORSEMIDE 75 MG: 20 TABLET ORAL at 08:59

## 2019-03-26 RX ADMIN — Medication 1000 UNITS: at 08:58

## 2019-03-26 RX ADMIN — METFORMIN HYDROCHLORIDE 500 MG: 500 TABLET ORAL at 08:58

## 2019-03-26 RX ADMIN — TORSEMIDE 75 MG: 20 TABLET ORAL at 17:13

## 2019-03-26 RX ADMIN — POTASSIUM CHLORIDE 40 MEQ: 750 TABLET, EXTENDED RELEASE ORAL at 20:44

## 2019-03-26 RX ADMIN — CHLORHEXIDINE GLUCONATE 0.12% ORAL RINSE 15 ML: 1.2 LIQUID ORAL at 20:44

## 2019-03-26 RX ADMIN — LEVOTHYROXINE SODIUM 175 MCG: 175 TABLET ORAL at 05:45

## 2019-03-26 RX ADMIN — B-COMPLEX W/ C & FOLIC ACID TAB 1 TABLET: TAB at 08:58

## 2019-03-26 RX ADMIN — POLYETHYLENE GLYCOL 3350 17 G: 17 POWDER, FOR SOLUTION ORAL at 14:55

## 2019-03-26 RX ADMIN — ATORVASTATIN CALCIUM 40 MG: 20 TABLET, FILM COATED ORAL at 20:44

## 2019-03-26 RX ADMIN — ASPIRIN 81 MG: 81 TABLET, COATED ORAL at 08:58

## 2019-03-26 RX ADMIN — POTASSIUM CHLORIDE 20 MEQ: 750 TABLET, EXTENDED RELEASE ORAL at 00:47

## 2019-03-26 RX ADMIN — WARFARIN SODIUM 10 MG: 5 TABLET ORAL at 18:16

## 2019-03-26 RX ADMIN — TAMSULOSIN HYDROCHLORIDE 0.4 MG: 0.4 CAPSULE ORAL at 08:58

## 2019-03-26 RX ADMIN — OXYCODONE HYDROCHLORIDE AND ACETAMINOPHEN 500 MG: 500 TABLET ORAL at 08:58

## 2019-03-26 RX ADMIN — SENNOSIDES AND DOCUSATE SODIUM 2 TABLET: 8.6; 5 TABLET ORAL at 14:55

## 2019-03-26 RX ADMIN — METFORMIN HYDROCHLORIDE 500 MG: 500 TABLET ORAL at 18:17

## 2019-03-26 RX ADMIN — Medication 1 G: at 08:58

## 2019-03-26 RX ADMIN — MULTIPLE VITAMINS W/ MINERALS TAB 1 TABLET: TAB at 08:58

## 2019-03-26 RX ADMIN — POTASSIUM CHLORIDE 40 MEQ: 750 TABLET, EXTENDED RELEASE ORAL at 08:58

## 2019-03-26 RX ADMIN — METOPROLOL SUCCINATE 100 MG: 100 TABLET, EXTENDED RELEASE ORAL at 08:58

## 2019-03-26 ASSESSMENT — MIFFLIN-ST. JEOR: SCORE: 2508.08

## 2019-03-26 NOTE — PLAN OF CARE
A&O x4. Pt denied pain, nausea, SOB or numbness/tingling in extremities overnight. Pt voiding well. No BM this shift. Scheduled medications administered as ordered. Potassium replacement tablet also administered overnight. Pt sleeping on and off overnight. Call light within reach and pt able to make needs known.

## 2019-03-26 NOTE — NURSING NOTE
Reason For Visit:   Chief Complaint   Patient presents with     RECHECK     Diabetic foot care. 10 week follow up. Wound, right hallux. Previously using medihoney, now using betadine. Patient is requesting for it to be changed back to medihoney.      Medication Problem     Patient stated that he is taking the same medication but some doses have changed.        Pain Assessment  Patient Currently in Pain: Yes(Neuropathy pain. )  Primary Pain Location: Foot(Bilateral )              No current outpatient medications on file.        No Known Allergies

## 2019-03-26 NOTE — PROGRESS NOTES
[facility] ([facility])    Medicine Progress Note - Hospitalist Service       Date of Admission:  3/22/2019      Assessment & Plan   Clarke Moreira is a 68 year old male with hx a-fib, HFpEF, HTN, prior CVA (noted on imaging), PAD, DM2, diabetic foot ulcers, hypothyroidism, pulmonary HTN, THONG, and recent dental extraction (2/26/19) who was admitted to [facility] 3/6 for sepsis and MICHELLE. Subsequently diagnosed with maxillary sinusitis (completed abx 3/12). MICHELLE resolved with IVF. Hospital stay c/b a-fib with RVR. Transferred to TCU for rehab.      1. A-fib:  Hx ablation in 2008. Cardiology consulted during hospital stay for RVR. Unable to titrate metoprolol d/t low BP. Rates currently controlled. [id number] 5, INR therapeutic on coumadin.   - Cont coumadin per PharmD recs  - Cont metoprolol XL 100mg daily.   - Needs Ziopatch on discharge.     2. DM2:  A1C 6.1% on 2/26/19. On metformin 500mg BID. Sugars well controlled. No changes to current regimen.     3. Diabetic foot ulcers:  Podiatry consulted in hospital d/t black eschar on great toes, s/p bedside debridement.  Hx PAD, on ASA and statin. Follow up with podiatry today with bedside debridement of necrotic tissues. Overall appears to be healing.  - Cont daily dressing changes to R great toe with medihoney and DSD  - Follow up with podiatry as directed    4. HFpEF:  Previously on spironolactone and torsemide, held on hospital admission d/t sepsis and low BP. Torsemide resumed without issue. Wt 371 lbs, stable at TCU.   - Cont Torsemide 75mg BID.   - Daily weights    5. HTN:  Orthostatic in hospital, felt secondary to over-diuresis. Now improved.   - Continuing to hold PTA spironolactone and lisinopril.   - Monitor BP closely and restart lisinopril as needed.     6. Hypothyroidism:  Cont PTA Levothyroxine 175mcg daily.     7. Depression with SI:  Psych consulted. Safe at TCU w/o sitter. No   indication to start anti-depressants at this time. Patient currently  denies any worsening symptoms.     8. Urinary retention:  Currently no issues. Cont Flomax.     9. Hypokalemia:  Secondary to diuretics. On K-DUR 40 meq BID.     10. THONG:  CPAP at home. Patient currently electing not to use. Encouraged compliance.      11. Sinus infection:  Completed course of abx in hospital. No acute concerns.       Diet: Moderate Consistent CHO Diet    DVT Prophylaxis: WarfarinWarfarin  Bazzi Catheter: not present  Code Status: DNR/DNI      Disposition Plan   Expected discharge: 4/7, recommended to prior living arrangement once Completion of therapies.  Entered: Carmelo Hendrix PA-C 03/26/2019, 6:03 PM       The patient's care was discussed with the Attending Physician, Dr. Delarosa.    Carmelo Hendrix PA-C  Hospitalist Service  [facility] ([facility])    ______________________________________________________________________    Interval History   Patient reports feeling well today. Just returned from podiatry appointment and reports ulcers healing well. Denies any pain. No worsening edema or dyspnea. No chest pain or palpitations. Denies any GI or  complaints.     Data reviewed today: I reviewed all medications, new labs and imaging results over the last 24 hours. I personally reviewed no images or EKG's today.    Physical Exam   Vital Signs: Temp: 96.4  F (35.8  C) Temp src: Oral BP: 106/63 Pulse: 87   Resp: 20 SpO2: 96 % O2 Device: None (Room air)    Weight: 374 lbs 12.8 oz    GENERAL:  Awake. Alert. Oriented x 3. NAD.   HEENT:  No scleral icterus. Mucous membranes moist.   CV:  Irregularly irregular. S1, S2. No murmur.   RESPIRATORY:  Lungs CTAB with no wheezing, rales, rhonchi.   GI:  Abdomen soft, non-distended. Active bowel sounds. No tenderness.    NEUROLOGICAL:  Grossly non-focal. Moves all extremities.    EXTREMITIES:  1+ LE edema. No calf tenderness. Intact bilateral pedal pulses.   SKIN:  No jaundice, rashes, wounds or lesions.      Data   ROUTINE IP LABS (Last four  results)  Recent Labs   Lab 03/26/19 0615 03/25/19  2204 03/25/19  0539 03/23/19  1230 03/23/19  0624 03/21/19  0600 03/20/19  0607   NA  --   --  140  --  140 140 140   POTASSIUM 4.0 3.1* 3.3* 3.7 3.1* 3.4 3.2*   CHLORIDE  --   --  101  --  100 104 102   CO2  --   --  30  --  30 28 29   ANIONGAP  --   --  9  --  10 8 9   GLC  --   --  120*  --  112* 130* 122*   BUN  --   --  18  --  22 24 24   CR  --   --  0.85  --  0.99 0.91 0.97   CHERI  --   --  8.4*  --  8.7 8.8 8.9   MAG  --   --   --   --  2.2 2.1 2.1     No lab results found in last 7 days.  Recent Labs   Lab 03/26/19 0615 03/25/19  0539 03/24/19  0601 03/23/19  0624   INR 2.31* 2.14* 1.99* 1.90*        Glucose Values Latest Ref Rng & Units 3/24/2019 3/24/2019 3/25/2019 3/25/2019 3/25/2019 3/26/2019 3/26/2019   Bedside Glucose (mg/dl )  - -- -- -- -- -- -- --   GLUCOSE 70 - 99 mg/dL 128(H) 125(H) 120(H) 111(H) 121(H) 113(H) 98   Some recent data might be hidden

## 2019-03-26 NOTE — PLAN OF CARE
Pt is able to ambulate with a walker and assist of 1. Limited flexion noted on the right L/E. Pt was taken to Orthopedic appointment this morning. New order in place to use Medihoney for wound care on the right big toe. No bowel movement since 3/23/19. Miralax and Senokot were provided this afternoon, no result at this time. Pt is on Potassium protocol. Potassium result of 4.0 this morning, no supplement is required today. Will continue to monitor bowel status.

## 2019-03-26 NOTE — PLAN OF CARE
PT: Pt ambulated 500' x1 w/ FWW w/ close SBA. Pt needs standing rest breaks x5, improves in gait mechanics and stability with distance. Pt's vitals stable throughout. Bed mobility and sit<>stands SBA. -20 min d/t transport to procedure.

## 2019-03-26 NOTE — PROGRESS NOTES
Pt alert and oriented. Stand by assist w/ walker. Complains of soreness on his left ankle/ foot (from previous therapies), but refuses medication. Pt uses urinal at bedside and staff empties it. Potassium was 3.1 tonight, so 40 mEq were given at 2300, per protocol. Lab will be up at 0600 3/26 to draw potassium levels again. Pt ate 75% of meal tonight. No bowel or bladder concerns. Will continue POC.

## 2019-03-26 NOTE — LETTER
3/26/2019       RE: Clarke Moreira  [address]  [place] [id number]     Dear Colleague,    Thank you for referring your patient, Clarke Moreira, to [facility]. Please see a copy of my visit note below.    Chief Complaint   Patient presents with     RECHECK     Diabetic foot care. 10 week follow up. Wound, right hallux. Previously using medihoney, now using betadine. Patient is requesting for it to be changed back to medihoney.      Medication Problem     Patient stated that he is taking the same medication but some doses have changed.         No Known Allergies    Subjective: Clarke is a 68 year old male who presents to the clinic today for a diabetic foot exam and management. He relates that he is currently in the [facility] after a hospital stay 2/2 sepsis in his maxillary sinus after a dental extraction. His stay was complicated by a.fib. Relates that he is regaining his strength. TCU has been covering the right hallux with Medihoney and a DSD. Relates no pain in the feet. He is wearing lymphedema wraps to BL legs. Has finished abx    Objective  DP and PT pulses cannot be palpated.  Diminished pedal hair.    Protective sensation diminished.  He does relate some numbness in the bottom of both feet.  Nails are thickened, elongated, yellow, brittle, with subungual debris debris consistent with onychomycosis ×10. Left lateral nail is ingrown with no s/s of infection, but does have sanguinous drainage again. This also had some scant bleeding with debridement.  There is some thickening to the right distal Achilles, with pain noted with palpation of this area.   A wound is noted at right  dital hallux measuring 0.3cm x 0.1cm x 0.3cm          Sosa Classification: 2    Wound base: Not Visible    Edges: hyperkeratotic    Drainage: scant/serosanguinous    Odor: no    Undermining: no    Bone Exposure: No    Clinical Signs of  Infection: No    After obtaining patient consent, the wound was irrigated with copious amounts of saline. A scalpel was then used to debride the wound into dermal tissue. The wound edges were debrided back to healthy, bleeding tissue. The wound base exhibited healthy bleeding. Given the patient's lack of sensation, no anesthesia was necessary for the procedure.           Assessment: Type 2 diabetes with neuropathy.    Onychomycosis ×10.    Lymphedema  Right hallux ulceration.       Plan:  - Pt seen and evaluated.  - Nails were debrided x 10.  - Right hallux ulcer was sharply debrided as described.   - Orders to continue daily changes.  - See again in 1 week or sooner if problems arise.     Again, thank you for allowing me to participate in the care of your patient.      Sincerely,    Jesús Lagos DPM

## 2019-03-27 LAB
GLUCOSE BLDC GLUCOMTR-MCNC: 118 MG/DL (ref 70–99)
GLUCOSE BLDC GLUCOMTR-MCNC: 149 MG/DL (ref 70–99)
INR PPP: 2.42 (ref 0.86–1.14)

## 2019-03-27 PROCEDURE — 85610 PROTHROMBIN TIME: CPT | Performed by: PHYSICIAN ASSISTANT

## 2019-03-27 PROCEDURE — 12000022 ZZH R&B SNF

## 2019-03-27 PROCEDURE — 36415 COLL VENOUS BLD VENIPUNCTURE: CPT | Performed by: PHYSICIAN ASSISTANT

## 2019-03-27 PROCEDURE — 25000132 ZZH RX MED GY IP 250 OP 250 PS 637: Performed by: INTERNAL MEDICINE

## 2019-03-27 PROCEDURE — 97110 THERAPEUTIC EXERCISES: CPT | Mod: GP | Performed by: PHYSICAL THERAPIST

## 2019-03-27 PROCEDURE — 25000132 ZZH RX MED GY IP 250 OP 250 PS 637: Performed by: PHYSICIAN ASSISTANT

## 2019-03-27 PROCEDURE — 97535 SELF CARE MNGMENT TRAINING: CPT | Mod: GO | Performed by: OCCUPATIONAL THERAPIST

## 2019-03-27 PROCEDURE — 97110 THERAPEUTIC EXERCISES: CPT | Mod: GO | Performed by: OCCUPATIONAL THERAPIST

## 2019-03-27 RX ORDER — WARFARIN SODIUM 5 MG/1
10 TABLET ORAL
Status: COMPLETED | OUTPATIENT
Start: 2019-03-27 | End: 2019-03-27

## 2019-03-27 RX ADMIN — MULTIPLE VITAMINS W/ MINERALS TAB 1 TABLET: TAB at 08:55

## 2019-03-27 RX ADMIN — CHLORHEXIDINE GLUCONATE 0.12% ORAL RINSE 15 ML: 1.2 LIQUID ORAL at 20:50

## 2019-03-27 RX ADMIN — TAMSULOSIN HYDROCHLORIDE 0.4 MG: 0.4 CAPSULE ORAL at 08:54

## 2019-03-27 RX ADMIN — Medication 1000 UNITS: at 08:54

## 2019-03-27 RX ADMIN — METOPROLOL SUCCINATE 100 MG: 100 TABLET, EXTENDED RELEASE ORAL at 08:54

## 2019-03-27 RX ADMIN — Medication 1 G: at 08:54

## 2019-03-27 RX ADMIN — POTASSIUM CHLORIDE 40 MEQ: 750 TABLET, EXTENDED RELEASE ORAL at 20:50

## 2019-03-27 RX ADMIN — METFORMIN HYDROCHLORIDE 500 MG: 500 TABLET ORAL at 18:10

## 2019-03-27 RX ADMIN — POTASSIUM CHLORIDE 40 MEQ: 750 TABLET, EXTENDED RELEASE ORAL at 08:54

## 2019-03-27 RX ADMIN — B-COMPLEX W/ C & FOLIC ACID TAB 1 TABLET: TAB at 08:54

## 2019-03-27 RX ADMIN — TORSEMIDE 75 MG: 20 TABLET ORAL at 16:32

## 2019-03-27 RX ADMIN — ATORVASTATIN CALCIUM 40 MG: 20 TABLET, FILM COATED ORAL at 20:50

## 2019-03-27 RX ADMIN — WARFARIN SODIUM 10 MG: 5 TABLET ORAL at 18:10

## 2019-03-27 RX ADMIN — CHLORHEXIDINE GLUCONATE 0.12% ORAL RINSE 15 ML: 1.2 LIQUID ORAL at 08:54

## 2019-03-27 RX ADMIN — TORSEMIDE 75 MG: 20 TABLET ORAL at 08:53

## 2019-03-27 RX ADMIN — LEVOTHYROXINE SODIUM 175 MCG: 175 TABLET ORAL at 05:13

## 2019-03-27 RX ADMIN — METFORMIN HYDROCHLORIDE 500 MG: 500 TABLET ORAL at 08:55

## 2019-03-27 RX ADMIN — ASPIRIN 81 MG: 81 TABLET, COATED ORAL at 08:55

## 2019-03-27 RX ADMIN — OXYCODONE HYDROCHLORIDE AND ACETAMINOPHEN 500 MG: 500 TABLET ORAL at 08:54

## 2019-03-27 ASSESSMENT — MIFFLIN-ST. JEOR: SCORE: 2489.94

## 2019-03-27 NOTE — PLAN OF CARE
Patient up in the halls with PT ambulating.Denies pain.  Bilateral LE's wraps in place.eating and drinking okay.

## 2019-03-27 NOTE — PLAN OF CARE
OT: Progressing with ADL goals, SBA STS from elevated surface but requiring some physical assist STS from physical standard toilet height. Pt IND UB dressing, IND standing g/h, and Min A LB dressing. Pt tolerating longer periods of standing activity and longer distance mobility using fww.

## 2019-03-27 NOTE — PLAN OF CARE
PT: Pt is able to participate well with PT.  Pt needs standing rest breaks with amb with FWW at times due to fatigue, mild SAWANT, and to relax B UEs, 200 ft x 1, 160 ft x 1 with L ankle soreness after nustep and ambulation. . Pt's transfers effortful, cga to stand at times today. Cont toward goals.

## 2019-03-27 NOTE — PLAN OF CARE
RN: Pt alert and oriented, VSS. Appetite good. Pt had large BM this shift, blanchable redness in coccyx area, barrier cream applied. Lymphedema wraps removed for skin assessment and skin care, rewrapped after. Pt denies pain or SOB and has no complaints. Continue with POC.

## 2019-03-27 NOTE — PLAN OF CARE
A&O x4. Pt had not concerns overnight. Pt scheduled medications administered as ordered. Pt slept majority of shift. Call light within reach and pt able to make need known.

## 2019-03-28 LAB
GLUCOSE BLDC GLUCOMTR-MCNC: 108 MG/DL (ref 70–99)
GLUCOSE BLDC GLUCOMTR-MCNC: 118 MG/DL (ref 70–99)
INR PPP: 2.48 (ref 0.86–1.14)

## 2019-03-28 PROCEDURE — 85610 PROTHROMBIN TIME: CPT | Performed by: PHYSICIAN ASSISTANT

## 2019-03-28 PROCEDURE — 36415 COLL VENOUS BLD VENIPUNCTURE: CPT | Performed by: PHYSICIAN ASSISTANT

## 2019-03-28 PROCEDURE — 25000132 ZZH RX MED GY IP 250 OP 250 PS 637: Performed by: PHYSICIAN ASSISTANT

## 2019-03-28 PROCEDURE — 97110 THERAPEUTIC EXERCISES: CPT | Mod: GO | Performed by: OCCUPATIONAL THERAPIST

## 2019-03-28 PROCEDURE — 25000132 ZZH RX MED GY IP 250 OP 250 PS 637: Performed by: INTERNAL MEDICINE

## 2019-03-28 PROCEDURE — 97535 SELF CARE MNGMENT TRAINING: CPT | Mod: GO | Performed by: OCCUPATIONAL THERAPIST

## 2019-03-28 PROCEDURE — 97110 THERAPEUTIC EXERCISES: CPT | Mod: GP

## 2019-03-28 PROCEDURE — 25000132 ZZH RX MED GY IP 250 OP 250 PS 637

## 2019-03-28 PROCEDURE — 97530 THERAPEUTIC ACTIVITIES: CPT | Mod: GO | Performed by: OCCUPATIONAL THERAPIST

## 2019-03-28 PROCEDURE — 12000022 ZZH R&B SNF

## 2019-03-28 RX ORDER — WARFARIN SODIUM 5 MG/1
10 TABLET ORAL
Status: COMPLETED | OUTPATIENT
Start: 2019-03-28 | End: 2019-03-28

## 2019-03-28 RX ADMIN — Medication 1 G: at 08:36

## 2019-03-28 RX ADMIN — METFORMIN HYDROCHLORIDE 500 MG: 500 TABLET ORAL at 17:41

## 2019-03-28 RX ADMIN — Medication 1000 UNITS: at 08:36

## 2019-03-28 RX ADMIN — B-COMPLEX W/ C & FOLIC ACID TAB 1 TABLET: TAB at 08:34

## 2019-03-28 RX ADMIN — POTASSIUM CHLORIDE 40 MEQ: 750 TABLET, EXTENDED RELEASE ORAL at 08:33

## 2019-03-28 RX ADMIN — WARFARIN SODIUM 10 MG: 5 TABLET ORAL at 17:42

## 2019-03-28 RX ADMIN — OXYCODONE HYDROCHLORIDE AND ACETAMINOPHEN 500 MG: 500 TABLET ORAL at 08:37

## 2019-03-28 RX ADMIN — POTASSIUM CHLORIDE 40 MEQ: 750 TABLET, EXTENDED RELEASE ORAL at 20:14

## 2019-03-28 RX ADMIN — METFORMIN HYDROCHLORIDE 500 MG: 500 TABLET ORAL at 08:36

## 2019-03-28 RX ADMIN — MULTIPLE VITAMINS W/ MINERALS TAB 1 TABLET: TAB at 08:37

## 2019-03-28 RX ADMIN — TORSEMIDE 75 MG: 20 TABLET ORAL at 16:32

## 2019-03-28 RX ADMIN — CHLORHEXIDINE GLUCONATE 0.12% ORAL RINSE 15 ML: 1.2 LIQUID ORAL at 20:14

## 2019-03-28 RX ADMIN — ASPIRIN 81 MG: 81 TABLET, COATED ORAL at 08:37

## 2019-03-28 RX ADMIN — POLYETHYLENE GLYCOL 3350 17 G: 17 POWDER, FOR SOLUTION ORAL at 17:41

## 2019-03-28 RX ADMIN — TORSEMIDE 75 MG: 20 TABLET ORAL at 08:34

## 2019-03-28 RX ADMIN — ATORVASTATIN CALCIUM 40 MG: 20 TABLET, FILM COATED ORAL at 20:15

## 2019-03-28 RX ADMIN — TAMSULOSIN HYDROCHLORIDE 0.4 MG: 0.4 CAPSULE ORAL at 08:34

## 2019-03-28 RX ADMIN — LEVOTHYROXINE SODIUM 175 MCG: 175 TABLET ORAL at 06:00

## 2019-03-28 RX ADMIN — CHLORHEXIDINE GLUCONATE 0.12% ORAL RINSE 15 ML: 1.2 LIQUID ORAL at 08:37

## 2019-03-28 RX ADMIN — METOPROLOL SUCCINATE 100 MG: 100 TABLET, EXTENDED RELEASE ORAL at 08:36

## 2019-03-28 NOTE — PLAN OF CARE
Patient is back from radiation,forgetful,bed and chair alarm in place..BG was 142 @ noon,no carb count.Denies pain.Colostomy intact and putting out medium size stool.

## 2019-03-28 NOTE — PLAN OF CARE
Patient participating in therapies,denies pain,eating and drinking okay.Wraps bilateral LE's in place.

## 2019-03-28 NOTE — PLAN OF CARE
Pt slept most of night. Denies pain and SOB. Uses urinal at bedside, staff empties. Uses call light appropriately. Continue with POC.

## 2019-03-28 NOTE — PLAN OF CARE
RN: Pt alert and oriented, VSS. No new concerns or issues this shift. Wound dressing changed to right great toe as ordered. Continue having redness in coccyx and buttocks, barrier cream applied. Pt denies pain or SOB and has no complaints. Continue with POC.

## 2019-03-29 LAB
GLUCOSE BLDC GLUCOMTR-MCNC: 106 MG/DL (ref 70–99)
GLUCOSE BLDC GLUCOMTR-MCNC: 124 MG/DL (ref 70–99)
INR PPP: 2.33 (ref 0.86–1.14)

## 2019-03-29 PROCEDURE — 25000132 ZZH RX MED GY IP 250 OP 250 PS 637: Performed by: PHYSICIAN ASSISTANT

## 2019-03-29 PROCEDURE — 99309 SBSQ NF CARE MODERATE MDM 30: CPT | Performed by: PHYSICIAN ASSISTANT

## 2019-03-29 PROCEDURE — 25000132 ZZH RX MED GY IP 250 OP 250 PS 637

## 2019-03-29 PROCEDURE — 97110 THERAPEUTIC EXERCISES: CPT | Mod: GP

## 2019-03-29 PROCEDURE — 12000022 ZZH R&B SNF

## 2019-03-29 PROCEDURE — 99207 ZZC CDG-CUT & PASTE-POTENTIAL IMPACT ON LEVEL: CPT | Performed by: PHYSICIAN ASSISTANT

## 2019-03-29 PROCEDURE — 25000132 ZZH RX MED GY IP 250 OP 250 PS 637: Performed by: INTERNAL MEDICINE

## 2019-03-29 PROCEDURE — 36415 COLL VENOUS BLD VENIPUNCTURE: CPT | Performed by: PHYSICIAN ASSISTANT

## 2019-03-29 PROCEDURE — 85610 PROTHROMBIN TIME: CPT | Performed by: PHYSICIAN ASSISTANT

## 2019-03-29 PROCEDURE — 97530 THERAPEUTIC ACTIVITIES: CPT | Mod: GO | Performed by: OCCUPATIONAL THERAPIST

## 2019-03-29 PROCEDURE — 00000146 ZZHCL STATISTIC GLUCOSE BY METER IP

## 2019-03-29 PROCEDURE — 97535 SELF CARE MNGMENT TRAINING: CPT | Mod: GO | Performed by: OCCUPATIONAL THERAPIST

## 2019-03-29 PROCEDURE — 40000894 ZZH STATISTIC OT IP EVAL DEFER: Performed by: OCCUPATIONAL THERAPIST

## 2019-03-29 PROCEDURE — 00220000 ZZH SNF RUG CODE OPNP

## 2019-03-29 PROCEDURE — 97530 THERAPEUTIC ACTIVITIES: CPT | Mod: GP

## 2019-03-29 RX ORDER — NAPROXEN 500 MG/1
500 TABLET ORAL 2 TIMES DAILY WITH MEALS
Status: COMPLETED | OUTPATIENT
Start: 2019-03-29 | End: 2019-04-03

## 2019-03-29 RX ADMIN — ASPIRIN 81 MG: 81 TABLET, COATED ORAL at 09:05

## 2019-03-29 RX ADMIN — LEVOTHYROXINE SODIUM 175 MCG: 175 TABLET ORAL at 06:40

## 2019-03-29 RX ADMIN — Medication 1000 UNITS: at 09:03

## 2019-03-29 RX ADMIN — METFORMIN HYDROCHLORIDE 500 MG: 500 TABLET ORAL at 17:59

## 2019-03-29 RX ADMIN — Medication 1 G: at 09:03

## 2019-03-29 RX ADMIN — METOPROLOL SUCCINATE 100 MG: 100 TABLET, EXTENDED RELEASE ORAL at 09:01

## 2019-03-29 RX ADMIN — B-COMPLEX W/ C & FOLIC ACID TAB 1 TABLET: TAB at 09:01

## 2019-03-29 RX ADMIN — TORSEMIDE 75 MG: 20 TABLET ORAL at 09:00

## 2019-03-29 RX ADMIN — ATORVASTATIN CALCIUM 40 MG: 20 TABLET, FILM COATED ORAL at 20:40

## 2019-03-29 RX ADMIN — TAMSULOSIN HYDROCHLORIDE 0.4 MG: 0.4 CAPSULE ORAL at 09:00

## 2019-03-29 RX ADMIN — TORSEMIDE 75 MG: 20 TABLET ORAL at 16:11

## 2019-03-29 RX ADMIN — METFORMIN HYDROCHLORIDE 500 MG: 500 TABLET ORAL at 09:03

## 2019-03-29 RX ADMIN — POTASSIUM CHLORIDE 40 MEQ: 750 TABLET, EXTENDED RELEASE ORAL at 09:00

## 2019-03-29 RX ADMIN — CHLORHEXIDINE GLUCONATE 0.12% ORAL RINSE 15 ML: 1.2 LIQUID ORAL at 20:40

## 2019-03-29 RX ADMIN — MULTIPLE VITAMINS W/ MINERALS TAB 1 TABLET: TAB at 09:00

## 2019-03-29 RX ADMIN — CHLORHEXIDINE GLUCONATE 0.12% ORAL RINSE 15 ML: 1.2 LIQUID ORAL at 09:00

## 2019-03-29 RX ADMIN — WARFARIN SODIUM 12.5 MG: 7.5 TABLET ORAL at 17:59

## 2019-03-29 RX ADMIN — NAPROXEN 500 MG: 500 TABLET ORAL at 17:59

## 2019-03-29 RX ADMIN — OXYCODONE HYDROCHLORIDE AND ACETAMINOPHEN 500 MG: 500 TABLET ORAL at 09:03

## 2019-03-29 RX ADMIN — POTASSIUM CHLORIDE 40 MEQ: 750 TABLET, EXTENDED RELEASE ORAL at 20:40

## 2019-03-29 ASSESSMENT — MIFFLIN-ST. JEOR: SCORE: 2498.1

## 2019-03-29 NOTE — PROGRESS NOTES
[facility] ([facility])    Medicine Progress Note - Hospitalist Service       Date of Admission:  3/22/2019    Assessment & Plan   Clarke Moreira is a 68 year old male with hx a-fib, HFpEF, HTN, prior CVA (noted on imaging), PAD, DM2, diabetic foot ulcers, hypothyroidism, pulmonary HTN, THONG, and recent dental extraction (2/26/19) who was admitted to [facility] 3/6 for sepsis and MICHELLE. Subsequently diagnosed with maxillary sinusitis (completed abx 3/12). MICHELLE resolved with IVF. Hospital stay c/b a-fib with RVR. Transferred to TCU for rehab.      1. A-fib:  HR well controlled. [id number], on coumadin. INR therapeutic.   - Cont coumadin per PharmD recs  - Cont metoprolol XL 100mg daily.   - Needs Ziopatch on discharge (ordered).     2. DM2:  A1C 6.1% on 2/26/19. On metformin 500mg BID. Sugars well controlled. No changes to current regimen.     3. Diabetic foot ulcers:  Follows with podiatry. Overall appears to be healing.  - Cont daily dressing changes to R great toe with medihoney and DSD  - Follow up with podiatry as directed    4. HFpEF:  Previously on spironolactone and torsemide, held on hospital admission d/t sepsis and low BP. Torsemide resumed without issue. Wt stable at TCU.   - Cont Torsemide 75mg BID.   - Daily weights    5. HTN:  Orthostatic in hospital, felt secondary to over-diuresis. Now improved.   - Continuing to hold PTA spironolactone and lisinopril.   - Monitor BP closely and restart lisinopril as needed.     6. Hypothyroidism:  Cont PTA Levothyroxine 175mcg daily.     7. Depression with SI:  Psych consulted. Safe at TCU w/o sitter. No   indication to start anti-depressants at this time. Patient currently denies any worsening symptoms.     8. Urinary retention:  Currently no issues. Cont Flomax.     9. Hypokalemia:  Secondary to diuretics. On K-DUR 40 meq BID.     10. THONG:  CPAP at home. Patient currently electing not to use. Encouraged compliance.      11. Sinus infection:  Completed course  of abx in hospital. No acute concerns.     12. Left ankle pain:  No bony tenderness to suggest fracture. Suspect possible tendonitis vs sprain. Patient doesn't want to take pain meds but would benefit from anti-inflammatory.   - Trial Naproxen 500mg BID x 5 days  - Continue to ice and elevated PRN  - Will reassess pain in coming days      Diet: Moderate Consistent CHO Diet    DVT Prophylaxis: WarfarinWarfarin  Bazzi Catheter: not present  Code Status: DNR/DNI      Disposition Plan   Expected discharge: Therapy through 4/1, probable discharge 4/2, recommended to prior living arrangement once Completion of therapies.  Entered: Carmelo Hendrix PA-C 03/29/2019, 1:29 PM       The patient's care was discussed with the Attending Physician, Dr. Delarosa.    Carmelo Hendrix PA-C  Hospitalist Service  [facility] ([facility])    ______________________________________________________________________    Interval History   Continues to have mild pain in L ankle. Improved with ice, but still bothersome. Doesn't like taking pain medications, regardless of potential for addiction. Denies any dyspnea or chest pain. No GI or  complaints.     Data reviewed today: I reviewed all medications, new labs and imaging results over the last 24 hours. I personally reviewed no images or EKG's today.    Physical Exam   Vital Signs: Temp: 95.8  F (35.4  C) Temp src: Oral BP: 112/67 Pulse: 93 Heart Rate: 89 Resp: 18 SpO2: 96 % O2 Device: None (Room air)    Weight: 372 lbs 9.6 oz    GENERAL:  Awake. Alert. Oriented x 3. NAD.   HEENT:  No scleral icterus. Mucous membranes moist.   CV:  Irregularly irregular. S1, S2. No murmur.   RESPIRATORY:  Lungs CTAB with no wheezing, rales, rhonchi.   GI:  Abdomen soft, non-distended. Active bowel sounds. No tenderness.    NEUROLOGICAL:  Grossly non-focal. Moves all extremities.    EXTREMITIES:  1+ LE edema. No calf tenderness. Intact bilateral pedal pulses.   SKIN:  No jaundice, rashes, wounds or  lesions.      Data   ROUTINE IP LABS (Last four results)  Recent Labs   Lab 03/26/19  0615 03/25/19  2204 03/25/19  0539 03/23/19  1230 03/23/19  0624   NA  --   --  140  --  140   POTASSIUM 4.0 3.1* 3.3* 3.7 3.1*   CHLORIDE  --   --  101  --  100   CO2  --   --  30  --  30   ANIONGAP  --   --  9  --  10   GLC  --   --  120*  --  112*   BUN  --   --  18  --  22   CR  --   --  0.85  --  0.99   CHERI  --   --  8.4*  --  8.7   MAG  --   --   --   --  2.2     No lab results found in last 7 days.  Recent Labs   Lab 03/29/19  0637 03/28/19  0630 03/27/19  1334 03/26/19  0615   INR 2.33* 2.48* 2.42* 2.31*        Glucose Values Latest Ref Rng & Units 3/26/2019 3/26/2019 3/27/2019 3/27/2019 3/28/2019 3/28/2019 3/29/2019   Bedside Glucose (mg/dl )  - -- -- -- -- -- -- --   GLUCOSE 70 - 99 mg/dL 113(H) 98 149(H) 118(H) 108(H) 118(H) 106(H)   Some recent data might be hidden

## 2019-03-29 NOTE — PLAN OF CARE
OT: Discharge date modified to 4/2 and patient in agreement with plan. Pt. Progressing with OT goals.

## 2019-03-29 NOTE — PROGRESS NOTES
CLINICAL NUTRITION SERVICES - ASSESSMENT NOTE     Nutrition Prescription    RECOMMENDATIONS FOR MDs/PROVIDERS TO ORDER:  None today    Malnutrition Status:    Patient does not meet two of the above criteria necessary for diagnosing malnutrition    Recommendations already ordered by Registered Dietitian (RD):  None today    Future/Additional Recommendations:  If PO intakes decline, consider adding Boost Plus.      REASON FOR ASSESSMENT  Clarke Moreira is a/an 68 year old male assessed by the dietitian for MDS/PIPE    NUTRITION HISTORY  - Pt unavailable x2 attempts. Information from 3/20 RD note:  - Patient expressed having a good appetite and PO intake at baseline. However, ~1 month ago, patient reports eating maybe 1 meal/day due to decreased appetite and not feeling well with infection/sepsis.   - During his admission at [facility], his appetite improved and he felt he was eating well and meeting his nutritional needs. Pt declined any nutrition related concerns at that time.    CURRENT NUTRITION ORDERS  Diet: Moderate Consistent Carbohydrate  Intake/Tolerance: 100% of meals per flowsheet. On average, pt is ordering 1720 kcal and 107 g protein daily per [employer]. This is likely meeting 83% minimum energy and 100% protein needs.    LABS  Labs reviewed  - A1C 6.1 (2/26)    MEDICATIONS  Medications reviewed  - Metformin BID  - Theravit-m  - Vitamin C  - Vitamin E    ANTHROPOMETRICS  Ht Readings from Last 1 Encounters:   03/22/19 1.829 m (6')   Most Recent Weight: (!) 169 kg (372 lb 9.6 oz)  IBW: 80.9 kg (209% IBW)  BMI: Obesity Grade III BMI >40  Weight History: last 3 documented wts highly variable, question in part d/t inaccuracy vs fluid shifts? Overall, appears he has lost 13 lbs (3%) over the last ~3 months.   Wt Readings from Last 10 Encounters:   03/29/19 (!) 169 kg (372 lb 9.6 oz)   03/20/19 (!) 161.8 kg (356 lb 11.3 oz)   02/19/19 (!) 175.4 kg (386 lb 9.6 oz)   12/12/18 (!) 175.1 kg (386 lb)    12/11/18 (!) 176.3 kg (388 lb 9.6 oz)   10/30/18 (!) 175.7 kg (387 lb 6.4 oz)   10/22/18 (!) 175.5 kg (387 lb)   10/10/18 (!) 173.3 kg (382 lb)   09/07/18 (!) 167.8 kg (369 lb 14.4 oz)   08/28/18 (!) 167.8 kg (370 lb)     Dosing Weight: 103 kg - adjusted from current wt    ASSESSED NUTRITION NEEDS  Estimated Energy Needs: 0249-6891 kcals/day (20 - 25 kcals/kg)  Justification: Obese  Estimated Protein Needs: 103-124 grams protein/day (1 - 1.2 grams of pro/kg)  Justification: Wound healing  Estimated Fluid Needs: 1 mL/kcal  Justification: Per provider pending fluid status    PHYSICAL FINDINGS  See malnutrition section below.  - Per PA note on 3/28, black eschar on great toes appears to be healing    MALNUTRITION  % Intake: No decreased intake noted  % Weight Loss: Weight loss does not meet criteria  Subcutaneous Fat Loss: Unable to assess, previously none observed  Muscle Loss: Unable to assess, previously none observed  Fluid Accumulation/Edema: Mild  Malnutrition Diagnosis: Patient does not meet two of the above criteria necessary for diagnosing malnutrition    NUTRITION DIAGNOSIS  Predicted inadequate protein-energy intake related to variable appetite as evidenced by pt reliant on PO intakes to meet 100% of nutritional needs with potential for variation       INTERVENTIONS  Implementation  Nutrition Education: Unable to complete   - Discussed pts POC during interdisciplinary morning rounds    Goals  Patient to consume % of nutritionally adequate meal trays TID, or the equivalent with supplements/snacks.     Monitoring/Evaluation  Progress toward goals will be monitored and evaluated per protocol.      Kenyetta Hernandez RD, LD  Unit Pager: [phone]

## 2019-03-29 NOTE — PLAN OF CARE
RN: Pt awake at the beginning of the shift. Has no c/o pain or discomfort so far since then. BLE lymphedema wraps intact. Appear to be sleeping/resting, Continue with plan of care.

## 2019-03-29 NOTE — PLAN OF CARE
Alert and oriented x4. Patient had no complains this shift. He participated in therapy. Patient lymphedema wraps to bilateral lower extremities intact. Continue to monitor.

## 2019-03-29 NOTE — PLAN OF CARE
The patient is alert and oriented x 3. VSS, Denies pain and able to make needs known. Appetite is good. Ace rewrapped to BLE.  A new dressing changes to the right great toe with med honey and non adhesive dressing. Prune juice and Miramax for constipation. No result yet. Ambulates independently with walker and a SBA. Declined shower tonight. Continue to monitor for comfort.

## 2019-03-30 LAB
GLUCOSE BLDC GLUCOMTR-MCNC: 106 MG/DL (ref 70–99)
GLUCOSE BLDC GLUCOMTR-MCNC: 98 MG/DL (ref 70–99)

## 2019-03-30 PROCEDURE — 97110 THERAPEUTIC EXERCISES: CPT | Mod: GO | Performed by: OCCUPATIONAL THERAPIST

## 2019-03-30 PROCEDURE — 97535 SELF CARE MNGMENT TRAINING: CPT | Mod: GO | Performed by: OCCUPATIONAL THERAPIST

## 2019-03-30 PROCEDURE — 25000132 ZZH RX MED GY IP 250 OP 250 PS 637: Performed by: PHYSICIAN ASSISTANT

## 2019-03-30 PROCEDURE — 25000132 ZZH RX MED GY IP 250 OP 250 PS 637: Performed by: INTERNAL MEDICINE

## 2019-03-30 PROCEDURE — 00000146 ZZHCL STATISTIC GLUCOSE BY METER IP

## 2019-03-30 PROCEDURE — 97110 THERAPEUTIC EXERCISES: CPT | Mod: GP

## 2019-03-30 PROCEDURE — 97530 THERAPEUTIC ACTIVITIES: CPT | Mod: GP

## 2019-03-30 PROCEDURE — 97116 GAIT TRAINING THERAPY: CPT | Mod: GP

## 2019-03-30 PROCEDURE — 12000022 ZZH R&B SNF

## 2019-03-30 RX ADMIN — TORSEMIDE 75 MG: 20 TABLET ORAL at 16:52

## 2019-03-30 RX ADMIN — ATORVASTATIN CALCIUM 40 MG: 20 TABLET, FILM COATED ORAL at 20:13

## 2019-03-30 RX ADMIN — NAPROXEN 500 MG: 500 TABLET ORAL at 08:05

## 2019-03-30 RX ADMIN — Medication 1000 UNITS: at 08:06

## 2019-03-30 RX ADMIN — METFORMIN HYDROCHLORIDE 500 MG: 500 TABLET ORAL at 08:09

## 2019-03-30 RX ADMIN — POTASSIUM CHLORIDE 40 MEQ: 750 TABLET, EXTENDED RELEASE ORAL at 20:13

## 2019-03-30 RX ADMIN — ASPIRIN 81 MG: 81 TABLET, COATED ORAL at 08:06

## 2019-03-30 RX ADMIN — METOPROLOL SUCCINATE 100 MG: 100 TABLET, EXTENDED RELEASE ORAL at 08:05

## 2019-03-30 RX ADMIN — OXYCODONE HYDROCHLORIDE AND ACETAMINOPHEN 500 MG: 500 TABLET ORAL at 08:06

## 2019-03-30 RX ADMIN — LEVOTHYROXINE SODIUM 175 MCG: 175 TABLET ORAL at 05:07

## 2019-03-30 RX ADMIN — MULTIPLE VITAMINS W/ MINERALS TAB 1 TABLET: TAB at 08:06

## 2019-03-30 RX ADMIN — CHLORHEXIDINE GLUCONATE 0.12% ORAL RINSE 15 ML: 1.2 LIQUID ORAL at 08:06

## 2019-03-30 RX ADMIN — Medication 1 G: at 08:06

## 2019-03-30 RX ADMIN — B-COMPLEX W/ C & FOLIC ACID TAB 1 TABLET: TAB at 08:05

## 2019-03-30 RX ADMIN — TORSEMIDE 75 MG: 20 TABLET ORAL at 08:04

## 2019-03-30 RX ADMIN — CHLORHEXIDINE GLUCONATE 0.12% ORAL RINSE 15 ML: 1.2 LIQUID ORAL at 20:13

## 2019-03-30 RX ADMIN — METFORMIN HYDROCHLORIDE 500 MG: 500 TABLET ORAL at 17:00

## 2019-03-30 RX ADMIN — NAPROXEN 500 MG: 500 TABLET ORAL at 17:00

## 2019-03-30 RX ADMIN — POTASSIUM CHLORIDE 40 MEQ: 750 TABLET, EXTENDED RELEASE ORAL at 08:06

## 2019-03-30 RX ADMIN — TAMSULOSIN HYDROCHLORIDE 0.4 MG: 0.4 CAPSULE ORAL at 08:06

## 2019-03-30 RX ADMIN — WARFARIN SODIUM 12.5 MG: 7.5 TABLET ORAL at 17:00

## 2019-03-30 ASSESSMENT — MIFFLIN-ST. JEOR: SCORE: 2495.38

## 2019-03-30 NOTE — PLAN OF CARE
OT: progressing as expected. HEP training today. OT will continue to refine until safe and independent during HEP is consistent.

## 2019-03-30 NOTE — PLAN OF CARE
Patient is alert and oriented x 4. Patient denied pain and discomfort all shift. BLE lymph wraps intact. No respiratory distress noted. Patient uses urinal and staff to empty. Will continue to monitor patient's status.

## 2019-03-30 NOTE — PLAN OF CARE
The patient is alert and oriented x 3. VSS. Denies pain at this time. Appetite is good. Paticipating in therapy. Ace to BLE are intact. Ambulates with walker and a SBA . Continue to monitor for comfort.

## 2019-03-30 NOTE — PLAN OF CARE
Patient is alert and oriented  x 4. Able to make needs known. No c/o pain or discomfort. Continent of bowel & bladder. Wound care to R great toe done per plan of care. Lymphedema wraps on to BLE, intact.Denies any cough, chest pain, SOB, lightheadedness & dizzines. Denies any chills of fever. No Nausea or vomiting. Call within reach.

## 2019-03-31 LAB
GLUCOSE BLDC GLUCOMTR-MCNC: 103 MG/DL (ref 70–99)
GLUCOSE BLDC GLUCOMTR-MCNC: 105 MG/DL (ref 70–99)
INR PPP: 2.4 (ref 0.86–1.14)

## 2019-03-31 PROCEDURE — 25000132 ZZH RX MED GY IP 250 OP 250 PS 637: Performed by: PHYSICIAN ASSISTANT

## 2019-03-31 PROCEDURE — 85610 PROTHROMBIN TIME: CPT | Performed by: INTERNAL MEDICINE

## 2019-03-31 PROCEDURE — 12000022 ZZH R&B SNF

## 2019-03-31 PROCEDURE — 00000146 ZZHCL STATISTIC GLUCOSE BY METER IP

## 2019-03-31 PROCEDURE — 25000132 ZZH RX MED GY IP 250 OP 250 PS 637: Performed by: INTERNAL MEDICINE

## 2019-03-31 PROCEDURE — 36415 COLL VENOUS BLD VENIPUNCTURE: CPT | Performed by: INTERNAL MEDICINE

## 2019-03-31 RX ORDER — WARFARIN SODIUM 5 MG/1
10 TABLET ORAL
Status: COMPLETED | OUTPATIENT
Start: 2019-03-31 | End: 2019-03-31

## 2019-03-31 RX ADMIN — Medication 1 G: at 09:12

## 2019-03-31 RX ADMIN — Medication 1000 UNITS: at 09:11

## 2019-03-31 RX ADMIN — ATORVASTATIN CALCIUM 40 MG: 20 TABLET, FILM COATED ORAL at 20:58

## 2019-03-31 RX ADMIN — OXYCODONE HYDROCHLORIDE AND ACETAMINOPHEN 500 MG: 500 TABLET ORAL at 09:12

## 2019-03-31 RX ADMIN — POTASSIUM CHLORIDE 40 MEQ: 750 TABLET, EXTENDED RELEASE ORAL at 09:11

## 2019-03-31 RX ADMIN — MULTIPLE VITAMINS W/ MINERALS TAB 1 TABLET: TAB at 09:11

## 2019-03-31 RX ADMIN — NAPROXEN 500 MG: 500 TABLET ORAL at 17:58

## 2019-03-31 RX ADMIN — TAMSULOSIN HYDROCHLORIDE 0.4 MG: 0.4 CAPSULE ORAL at 09:12

## 2019-03-31 RX ADMIN — POTASSIUM CHLORIDE 40 MEQ: 750 TABLET, EXTENDED RELEASE ORAL at 20:58

## 2019-03-31 RX ADMIN — TORSEMIDE 75 MG: 20 TABLET ORAL at 09:10

## 2019-03-31 RX ADMIN — METOPROLOL SUCCINATE 100 MG: 100 TABLET, EXTENDED RELEASE ORAL at 09:11

## 2019-03-31 RX ADMIN — NAPROXEN 500 MG: 500 TABLET ORAL at 09:12

## 2019-03-31 RX ADMIN — LEVOTHYROXINE SODIUM 175 MCG: 175 TABLET ORAL at 06:18

## 2019-03-31 RX ADMIN — CHLORHEXIDINE GLUCONATE 0.12% ORAL RINSE 15 ML: 1.2 LIQUID ORAL at 20:58

## 2019-03-31 RX ADMIN — B-COMPLEX W/ C & FOLIC ACID TAB 1 TABLET: TAB at 09:12

## 2019-03-31 RX ADMIN — CHLORHEXIDINE GLUCONATE 0.12% ORAL RINSE 15 ML: 1.2 LIQUID ORAL at 09:10

## 2019-03-31 RX ADMIN — WARFARIN SODIUM 10 MG: 5 TABLET ORAL at 17:59

## 2019-03-31 RX ADMIN — ASPIRIN 81 MG: 81 TABLET, COATED ORAL at 09:12

## 2019-03-31 RX ADMIN — METFORMIN HYDROCHLORIDE 500 MG: 500 TABLET ORAL at 09:12

## 2019-03-31 RX ADMIN — METFORMIN HYDROCHLORIDE 500 MG: 500 TABLET ORAL at 17:58

## 2019-03-31 RX ADMIN — TORSEMIDE 75 MG: 20 TABLET ORAL at 15:44

## 2019-03-31 ASSESSMENT — MIFFLIN-ST. JEOR: SCORE: 2503.09

## 2019-03-31 NOTE — PLAN OF CARE
Patient appears sleeping during rounds. Patient no complaints of pain and discomfort. Lymph wraps to BLE intact. Patient uses urinal and staff to empty.no respiratory distress noted. Will continue with current plan of care.

## 2019-03-31 NOTE — PLAN OF CARE
Patient is alert and oriented  x 4. Able to make needs known. No c/o pain or discomfort. Continent of bowel & bladder. Wound care to R great toe done per plan of care. Lymphedema wraps rewrapped. .Denies any cough, chest pain, SOB, lightheadedness & dizzines. Denies any chills of fever. No Nausea or vomiting. Call within reach.

## 2019-03-31 NOTE — PLAN OF CARE
Alert and oriented x4. Able to communicate needs. Patient had no complains this shift. Lymphedema wraps on. Patient is independent in room. Continent of B&B. Continue with current POC.

## 2019-04-01 LAB
GLUCOSE BLDC GLUCOMTR-MCNC: 106 MG/DL (ref 70–99)
GLUCOSE BLDC GLUCOMTR-MCNC: 126 MG/DL (ref 70–99)
INR PPP: 2.51 (ref 0.86–1.14)

## 2019-04-01 PROCEDURE — 85610 PROTHROMBIN TIME: CPT | Performed by: INTERNAL MEDICINE

## 2019-04-01 PROCEDURE — 25000132 ZZH RX MED GY IP 250 OP 250 PS 637: Performed by: PHYSICIAN ASSISTANT

## 2019-04-01 PROCEDURE — 25000132 ZZH RX MED GY IP 250 OP 250 PS 637: Performed by: INTERNAL MEDICINE

## 2019-04-01 PROCEDURE — 97530 THERAPEUTIC ACTIVITIES: CPT | Mod: GP

## 2019-04-01 PROCEDURE — 97110 THERAPEUTIC EXERCISES: CPT | Mod: GP

## 2019-04-01 PROCEDURE — 97535 SELF CARE MNGMENT TRAINING: CPT | Mod: GO | Performed by: OCCUPATIONAL THERAPIST

## 2019-04-01 PROCEDURE — 12000022 ZZH R&B SNF

## 2019-04-01 PROCEDURE — 36415 COLL VENOUS BLD VENIPUNCTURE: CPT | Performed by: INTERNAL MEDICINE

## 2019-04-01 PROCEDURE — 00000146 ZZHCL STATISTIC GLUCOSE BY METER IP

## 2019-04-01 RX ORDER — WARFARIN SODIUM 5 MG/1
10 TABLET ORAL
Status: COMPLETED | OUTPATIENT
Start: 2019-04-01 | End: 2019-04-01

## 2019-04-01 RX ADMIN — ATORVASTATIN CALCIUM 40 MG: 20 TABLET, FILM COATED ORAL at 21:38

## 2019-04-01 RX ADMIN — NAPROXEN 500 MG: 500 TABLET ORAL at 18:06

## 2019-04-01 RX ADMIN — CHLORHEXIDINE GLUCONATE 0.12% ORAL RINSE 15 ML: 1.2 LIQUID ORAL at 07:58

## 2019-04-01 RX ADMIN — METFORMIN HYDROCHLORIDE 500 MG: 500 TABLET ORAL at 18:07

## 2019-04-01 RX ADMIN — LEVOTHYROXINE SODIUM 175 MCG: 175 TABLET ORAL at 06:52

## 2019-04-01 RX ADMIN — Medication 1 G: at 07:58

## 2019-04-01 RX ADMIN — Medication 1000 UNITS: at 07:57

## 2019-04-01 RX ADMIN — MULTIPLE VITAMINS W/ MINERALS TAB 1 TABLET: TAB at 07:58

## 2019-04-01 RX ADMIN — ASPIRIN 81 MG: 81 TABLET, COATED ORAL at 07:57

## 2019-04-01 RX ADMIN — METOPROLOL SUCCINATE 100 MG: 100 TABLET, EXTENDED RELEASE ORAL at 07:57

## 2019-04-01 RX ADMIN — TORSEMIDE 75 MG: 20 TABLET ORAL at 07:58

## 2019-04-01 RX ADMIN — POTASSIUM CHLORIDE 40 MEQ: 750 TABLET, EXTENDED RELEASE ORAL at 21:39

## 2019-04-01 RX ADMIN — WARFARIN SODIUM 10 MG: 5 TABLET ORAL at 18:06

## 2019-04-01 RX ADMIN — POTASSIUM CHLORIDE 40 MEQ: 750 TABLET, EXTENDED RELEASE ORAL at 07:57

## 2019-04-01 RX ADMIN — TORSEMIDE 75 MG: 20 TABLET ORAL at 16:15

## 2019-04-01 RX ADMIN — NAPROXEN 500 MG: 500 TABLET ORAL at 07:57

## 2019-04-01 RX ADMIN — B-COMPLEX W/ C & FOLIC ACID TAB 1 TABLET: TAB at 07:57

## 2019-04-01 RX ADMIN — TAMSULOSIN HYDROCHLORIDE 0.4 MG: 0.4 CAPSULE ORAL at 07:58

## 2019-04-01 RX ADMIN — METFORMIN HYDROCHLORIDE 500 MG: 500 TABLET ORAL at 07:58

## 2019-04-01 RX ADMIN — OXYCODONE HYDROCHLORIDE AND ACETAMINOPHEN 500 MG: 500 TABLET ORAL at 07:57

## 2019-04-01 ASSESSMENT — MIFFLIN-ST. JEOR: SCORE: 2508.08

## 2019-04-01 NOTE — PLAN OF CARE
Patient is alert and oriented  x 4. Able to make needs known. No c/o pain or discomfort. Continent of bowel & bladder. Wound care to R great toe done per plan of care. Lymphedema wraps off. Showred .Denies any cough, chest pain, SOB, lightheadedness & dizzines. Denies any chills of fever. No Nausea or vomiting. Call within reach.

## 2019-04-01 NOTE — PLAN OF CARE
Alert and oriented x4. Patient is able to communicate his needs. He denies any pain this shift. Patient is independent in his room. Lymphedema wraps on. Patient is continent of both bowel and bladder.

## 2019-04-01 NOTE — PLAN OF CARE
Patient alert and oriented x 4. Patient no complaints of pain. Patient stated unable to sleep @ the beginning of the shift. Told patient will request sleeping medication to MD but patient declined. No respiratory distress noted. Will continue to monitor patient's status.

## 2019-04-01 NOTE — PLAN OF CARE
"OT- Patient focus on bed mobility, LE dressing, and toileting during session. Agreeable to complete all tasks and able to manage all tasks successfully. Reports his first shower in \"about a year\" last night and fatigued from this.  "

## 2019-04-01 NOTE — PROGRESS NOTES
Chief Complaint   Patient presents with     Right Great Toe - Follow Up     Follow Up     Great big toe wound           No Known Allergies      Subjective: Clarke is a 68 year old male who presents to the clinic today for a follow up of right hallux ulceration. Medihoney is still being used on the area and changed daily.     Objective  Attention was directed to the previous wound site on the right hallux.  There was no open lesion noted to the area.  There is no tyloma to debride today.  Area appears to be fully healed.            Assessment: Type 2 diabetes with neuropathy.    Onychomycosis ×10.    Lymphedema  Right hallux ulceration. Healed.    Plan:   - Pt seen and evaluated  - He is leaving the TCU soon. He should keep the digit covered with a bandaid.  - Pt to return to clinic in 10 weeks for regular visit or sooner if problems arise.

## 2019-04-02 ENCOUNTER — TELEPHONE (OUTPATIENT)
Dept: ORTHOPEDICS | Facility: CLINIC | Age: 69
End: 2019-04-02

## 2019-04-02 ENCOUNTER — OFFICE VISIT (OUTPATIENT)
Dept: ORTHOPEDICS | Facility: CLINIC | Age: 69
End: 2019-04-02
Payer: COMMERCIAL

## 2019-04-02 VITALS — HEIGHT: 72 IN | BODY MASS INDEX: 42.66 KG/M2 | WEIGHT: 315 LBS

## 2019-04-02 DIAGNOSIS — E11.65 TYPE 2 DIABETES MELLITUS WITH HYPERGLYCEMIA, WITHOUT LONG-TERM CURRENT USE OF INSULIN (H): Primary | ICD-10-CM

## 2019-04-02 LAB
GLUCOSE BLDC GLUCOMTR-MCNC: 112 MG/DL (ref 70–99)
GLUCOSE BLDC GLUCOMTR-MCNC: 98 MG/DL (ref 70–99)
INR PPP: 2.24 (ref 0.86–1.14)

## 2019-04-02 PROCEDURE — 97110 THERAPEUTIC EXERCISES: CPT | Mod: GP

## 2019-04-02 PROCEDURE — 97530 THERAPEUTIC ACTIVITIES: CPT | Mod: GP

## 2019-04-02 PROCEDURE — 12000022 ZZH R&B SNF

## 2019-04-02 PROCEDURE — 25000132 ZZH RX MED GY IP 250 OP 250 PS 637: Performed by: INTERNAL MEDICINE

## 2019-04-02 PROCEDURE — 97535 SELF CARE MNGMENT TRAINING: CPT | Mod: GO | Performed by: OCCUPATIONAL THERAPIST

## 2019-04-02 PROCEDURE — 25000132 ZZH RX MED GY IP 250 OP 250 PS 637: Performed by: PHYSICIAN ASSISTANT

## 2019-04-02 PROCEDURE — 36415 COLL VENOUS BLD VENIPUNCTURE: CPT | Performed by: INTERNAL MEDICINE

## 2019-04-02 PROCEDURE — 85610 PROTHROMBIN TIME: CPT | Performed by: INTERNAL MEDICINE

## 2019-04-02 RX ORDER — GARLIC 200 MG
500 TABLET ORAL DAILY
Refills: 0 | Status: ON HOLD | COMMUNITY
Start: 2019-04-02 | End: 2024-09-25

## 2019-04-02 RX ADMIN — WARFARIN SODIUM 12.5 MG: 7.5 TABLET ORAL at 18:17

## 2019-04-02 RX ADMIN — METFORMIN HYDROCHLORIDE 500 MG: 500 TABLET ORAL at 18:17

## 2019-04-02 RX ADMIN — ASPIRIN 81 MG: 81 TABLET, COATED ORAL at 10:21

## 2019-04-02 RX ADMIN — TORSEMIDE 75 MG: 20 TABLET ORAL at 10:19

## 2019-04-02 RX ADMIN — Medication 1 G: at 10:19

## 2019-04-02 RX ADMIN — POTASSIUM CHLORIDE 40 MEQ: 750 TABLET, EXTENDED RELEASE ORAL at 21:29

## 2019-04-02 RX ADMIN — LEVOTHYROXINE SODIUM 175 MCG: 175 TABLET ORAL at 07:21

## 2019-04-02 RX ADMIN — ATORVASTATIN CALCIUM 40 MG: 20 TABLET, FILM COATED ORAL at 21:30

## 2019-04-02 RX ADMIN — CHLORHEXIDINE GLUCONATE 0.12% ORAL RINSE 15 ML: 1.2 LIQUID ORAL at 10:18

## 2019-04-02 RX ADMIN — B-COMPLEX W/ C & FOLIC ACID TAB 1 TABLET: TAB at 10:20

## 2019-04-02 RX ADMIN — TORSEMIDE 75 MG: 20 TABLET ORAL at 16:35

## 2019-04-02 RX ADMIN — NAPROXEN 500 MG: 500 TABLET ORAL at 10:20

## 2019-04-02 RX ADMIN — POTASSIUM CHLORIDE 40 MEQ: 750 TABLET, EXTENDED RELEASE ORAL at 10:21

## 2019-04-02 RX ADMIN — CHLORHEXIDINE GLUCONATE 0.12% ORAL RINSE 15 ML: 1.2 LIQUID ORAL at 21:30

## 2019-04-02 RX ADMIN — OXYCODONE HYDROCHLORIDE AND ACETAMINOPHEN 500 MG: 500 TABLET ORAL at 10:19

## 2019-04-02 RX ADMIN — TAMSULOSIN HYDROCHLORIDE 0.4 MG: 0.4 CAPSULE ORAL at 10:20

## 2019-04-02 RX ADMIN — NAPROXEN 500 MG: 500 TABLET ORAL at 18:17

## 2019-04-02 RX ADMIN — METOPROLOL SUCCINATE 100 MG: 100 TABLET, EXTENDED RELEASE ORAL at 10:20

## 2019-04-02 RX ADMIN — MULTIPLE VITAMINS W/ MINERALS TAB 1 TABLET: TAB at 10:20

## 2019-04-02 RX ADMIN — METFORMIN HYDROCHLORIDE 500 MG: 500 TABLET ORAL at 10:20

## 2019-04-02 RX ADMIN — Medication 1000 UNITS: at 10:19

## 2019-04-02 ASSESSMENT — MIFFLIN-ST. JEOR
SCORE: 2508.08
SCORE: 2497.2

## 2019-04-02 NOTE — PLAN OF CARE
Occupational Therapy Discharge Summary    Reason for therapy discharge:    All goals and outcomes met, no further needs identified.    Progress towards therapy goal(s). See goals on Care Plan in [facility] electronic health record for goal details.  Goals met    Therapy recommendation(s):    Continued therapy is recommended.  Rationale/Recommendations:  Patient discharging home alone with services. Patient has all AE/assistive device at home already from previous admission. Patient with a modified UE HEP for use at home. .  Continue home exercise program.

## 2019-04-02 NOTE — NURSING NOTE
Reason For Visit:   Chief Complaint   Patient presents with     Right Great Toe - Follow Up     Follow Up     Great big toe wound        Ht 1.829 m (6')   Wt (!) 170 kg (374 lb 12.8 oz)   BMI 50.83 kg/m      Pain Assessment  Patient Currently in Pain: Lauren Lopez, CMA

## 2019-04-02 NOTE — PLAN OF CARE
Pt alert and oriented. VSS. Denies pain, denies SOB. Independent in the room. Utilizes urinal at bedside, staff empties. Lymphedema wraps intact. Able to make needs known. Call light within. Continue to monitor.

## 2019-04-02 NOTE — TELEPHONE ENCOUNTER
[facility]    Phone Message    May a detailed message be left on voicemail: yes    Reason for Call: Other: Pt is scheduled with  at 11:40 4/2/19. [facility] calling to inform clinic that ride will be arriving at 12:30 for pt.If this should be changed please contact ride service.     Action Taken: Message routed to:  [facility] ortho

## 2019-04-02 NOTE — PLAN OF CARE
RN: Pt has no c/o pain or discomfort this shift. Using urinal in bed. Appear to be sleeping/resting. Continue with plan of care.

## 2019-04-02 NOTE — LETTER
4/2/2019       RE: Clarke Moreira  [address]  [facility] [place] [address]     Dear Colleague,    Thank you for referring your patient, Clarke Moreira, [facility]. Please see a copy of my visit note below.    Chief Complaint   Patient presents with     Right Great Toe - Follow Up     Follow Up     Great big toe wound           No Known Allergies      Subjective: Clarke is a 68 year old male who presents to the clinic today for a follow up of right hallux ulceration. Medihoney is still being used on the area and changed daily.     Objective  Attention was directed to the previous wound site on the right hallux.  There was no open lesion noted to the area.  There is no tyloma to debride today.  Area appears to be fully healed.            Assessment: Type 2 diabetes with neuropathy.    Onychomycosis ×10.    Lymphedema  Right hallux ulceration.  Healed.    Plan:   - Pt seen and evaluated  - He is leaving the TCU soon. He should keep the digit covered with a bandaid.  - Pt to return to clinic in 10 weeks for regular visit or sooner if problems arise.         Again, thank you for allowing me to participate in the care of your patient.      Sincerely,    Jesús Lagos DPM

## 2019-04-03 ENCOUNTER — MEDICAL CORRESPONDENCE (OUTPATIENT)
Dept: CARDIOLOGY | Facility: CLINIC | Age: 69
End: 2019-04-03
Payer: COMMERCIAL

## 2019-04-03 ENCOUNTER — ANCILLARY PROCEDURE (OUTPATIENT)
Dept: CARDIOLOGY | Facility: CLINIC | Age: 69
End: 2019-04-03
Attending: INTERNAL MEDICINE
Payer: COMMERCIAL

## 2019-04-03 VITALS
DIASTOLIC BLOOD PRESSURE: 89 MMHG | SYSTOLIC BLOOD PRESSURE: 130 MMHG | BODY MASS INDEX: 42.66 KG/M2 | OXYGEN SATURATION: 100 % | WEIGHT: 315 LBS | TEMPERATURE: 96.6 F | HEART RATE: 90 BPM | HEIGHT: 72 IN | RESPIRATION RATE: 16 BRPM

## 2019-04-03 DIAGNOSIS — I48.20 CHRONIC ATRIAL FIBRILLATION (H): ICD-10-CM

## 2019-04-03 DIAGNOSIS — I48.0 PAROXYSMAL ATRIAL FIBRILLATION (H): ICD-10-CM

## 2019-04-03 LAB
GLUCOSE BLDC GLUCOMTR-MCNC: 103 MG/DL (ref 70–99)
GLUCOSE BLDC GLUCOMTR-MCNC: 98 MG/DL (ref 70–99)
INR PPP: 2.27 (ref 0.86–1.14)

## 2019-04-03 PROCEDURE — 85610 PROTHROMBIN TIME: CPT | Performed by: INTERNAL MEDICINE

## 2019-04-03 PROCEDURE — 0298T ZZC EXT ECG > 48HR TO 21 DAY REVIEW AND INTERPRETATN: CPT | Performed by: INTERNAL MEDICINE

## 2019-04-03 PROCEDURE — 0296T ZIO PATCH HOLTER ADULT PEDIATRIC GREATER THAN 48 HRS: CPT | Mod: ZF

## 2019-04-03 PROCEDURE — 25000132 ZZH RX MED GY IP 250 OP 250 PS 637: Performed by: PHYSICIAN ASSISTANT

## 2019-04-03 PROCEDURE — 36415 COLL VENOUS BLD VENIPUNCTURE: CPT | Performed by: INTERNAL MEDICINE

## 2019-04-03 PROCEDURE — 99316 NF DSCHRG MGMT 30 MIN+: CPT | Performed by: PHYSICIAN ASSISTANT

## 2019-04-03 PROCEDURE — 25000132 ZZH RX MED GY IP 250 OP 250 PS 637: Performed by: INTERNAL MEDICINE

## 2019-04-03 PROCEDURE — 82962 GLUCOSE BLOOD TEST: CPT

## 2019-04-03 RX ORDER — POTASSIUM CHLORIDE 1500 MG/1
40 TABLET, EXTENDED RELEASE ORAL 2 TIMES DAILY
Qty: 60 TABLET | Refills: 0 | Status: SHIPPED | OUTPATIENT
Start: 2019-04-03 | End: 2019-04-17

## 2019-04-03 RX ORDER — TAMSULOSIN HYDROCHLORIDE 0.4 MG/1
0.4 CAPSULE ORAL DAILY
Qty: 30 CAPSULE | Refills: 0 | Status: SHIPPED | OUTPATIENT
Start: 2019-04-03 | End: 2019-04-17

## 2019-04-03 RX ORDER — TORSEMIDE 10 MG/1
20 TABLET ORAL DAILY
Qty: 60 TABLET | Refills: 0 | Status: CANCELLED | OUTPATIENT
Start: 2019-04-03 | End: 2019-05-03

## 2019-04-03 RX ORDER — TORSEMIDE 100 MG/1
TABLET ORAL
Qty: 45 TABLET | Refills: 0 | Status: SHIPPED | OUTPATIENT
Start: 2019-04-03 | End: 2019-04-17

## 2019-04-03 RX ORDER — WARFARIN SODIUM 5 MG/1
TABLET ORAL
Qty: 75 TABLET | Refills: 11 | Status: SHIPPED | OUTPATIENT
Start: 2019-04-03 | End: 2019-04-17

## 2019-04-03 RX ORDER — METOPROLOL SUCCINATE 100 MG/1
100 TABLET, EXTENDED RELEASE ORAL DAILY
Qty: 30 TABLET | Refills: 0 | Status: SHIPPED | OUTPATIENT
Start: 2019-04-03 | End: 2019-04-17

## 2019-04-03 RX ADMIN — TAMSULOSIN HYDROCHLORIDE 0.4 MG: 0.4 CAPSULE ORAL at 08:09

## 2019-04-03 RX ADMIN — METFORMIN HYDROCHLORIDE 500 MG: 500 TABLET ORAL at 08:09

## 2019-04-03 RX ADMIN — B-COMPLEX W/ C & FOLIC ACID TAB 1 TABLET: TAB at 08:08

## 2019-04-03 RX ADMIN — TORSEMIDE 75 MG: 20 TABLET ORAL at 17:01

## 2019-04-03 RX ADMIN — METFORMIN HYDROCHLORIDE 500 MG: 500 TABLET ORAL at 17:02

## 2019-04-03 RX ADMIN — CHLORHEXIDINE GLUCONATE 0.12% ORAL RINSE 15 ML: 1.2 LIQUID ORAL at 08:08

## 2019-04-03 RX ADMIN — Medication 1000 UNITS: at 08:08

## 2019-04-03 RX ADMIN — OXYCODONE HYDROCHLORIDE AND ACETAMINOPHEN 500 MG: 500 TABLET ORAL at 08:09

## 2019-04-03 RX ADMIN — POTASSIUM CHLORIDE 40 MEQ: 750 TABLET, EXTENDED RELEASE ORAL at 08:09

## 2019-04-03 RX ADMIN — LEVOTHYROXINE SODIUM 175 MCG: 175 TABLET ORAL at 06:31

## 2019-04-03 RX ADMIN — ASPIRIN 81 MG: 81 TABLET, COATED ORAL at 08:08

## 2019-04-03 RX ADMIN — MULTIPLE VITAMINS W/ MINERALS TAB 1 TABLET: TAB at 08:09

## 2019-04-03 RX ADMIN — Medication 1 G: at 08:08

## 2019-04-03 RX ADMIN — NAPROXEN 500 MG: 500 TABLET ORAL at 08:09

## 2019-04-03 RX ADMIN — WARFARIN SODIUM 12.5 MG: 7.5 TABLET ORAL at 17:02

## 2019-04-03 RX ADMIN — TORSEMIDE 75 MG: 20 TABLET ORAL at 08:08

## 2019-04-03 RX ADMIN — METOPROLOL SUCCINATE 100 MG: 100 TABLET, EXTENDED RELEASE ORAL at 08:08

## 2019-04-03 NOTE — PLAN OF CARE
Physical Therapy Discharge Summary    Reason for therapy discharge:    Discharged to home with home therapy. Home 4/3    Progress towards therapy goal(s). See goals on Care Plan in [facility] electronic health record for goal details.  Goals met    Therapy recommendation(s):    Home PT for functional mobility and strengthening

## 2019-04-03 NOTE — PLAN OF CARE
Pt alert and oriented. VSS. Denies pain, denies SOB. Independent in the room. Utilizes urinal at bedside, staff empties. BLE Lymphedema wraps replaced, foot care completed. Anticipating on going home tomorrow. Able to make needs known. Call light within reach. Continue to monitor.

## 2019-04-03 NOTE — PHARMACY-ANTICOAGULATION SERVICE
Clinical Pharmacy- Warfarin Discharge Note  This patient is currently on warfarin for the treatment of Atrial fibrillation.  INR Goal= 2-3    Anticoagulation Dose History     Recent Dosing and Labs Latest Ref Rng & Units 3/28/2019 3/29/2019 3/30/2019 3/31/2019 4/1/2019 4/2/2019 4/3/2019    GISELL IMS TEMPLATE - - 12.5 mg 12.5 mg - - 12.5 mg -    Warfarin 5 mg - 10 mg - - 10 mg 10 mg - -    INR 0.86 - 1.14 2.48(H) 2.33(H) - 2.40(H) 2.51(H) 2.24(H) 2.27(H)    INR 0.86 - 1.14 - - - - - - -        Vitamin K doses administered during the last 7 days: none  FFP administered during the last 7 days: none    Recommend discharging the patient on a warfarin regimen of 10 mg Mondays and Fridays, 12.5mg rest of the week with a prescription for warfarin 5mg tablets (or whatever patient prefers).  Take 12.5mg tonight.    The patient should have an INR checked Monday, April 8th.    Devorah Nova, PharmD, BCPS

## 2019-04-03 NOTE — PLAN OF CARE
Alert and oriented x3. Able to communicate needs. Patient denies any pain this shift. Patient independent in his room. Out to an appointment at this time and plans to discharge home this evening.

## 2019-04-03 NOTE — PROGRESS NOTES
[facility]   Pt to be discharged home 04 03 19  and has agreed to have formerly [facility] resume services.  Patient care support center processing referral.  Resumption of home  care visit is scheduled for 04 04 19 or 04 05 19.

## 2019-04-03 NOTE — PLAN OF CARE
RN: Pt has no c/o pain or discomfort this shift. Using urinal in bed. Appear to be sleeping/resting.  For discharge today but will go to appointment first, come back then discharge.  is at 1:15 pm- pt updated. Continue with plan of care.

## 2019-04-03 NOTE — DISCHARGE SUMMARY
Transitional Care Unit  Discharge Summary    Clarke Moreira MRN# [id number]   Age: 68 year old YOB: 1950     Date of Admission:  3/22/2019  Date of Discharge:  4/3/19   Admitting Physician:  Kenij Hinds MD  Discharging Provider:  Mi Melendez PA-C  Primary Care Provider:             Matthias Sanchez         Outpatient To Do:   INR check Monday 4/8  F/u in [facility] clinic with Jada Mckeon NP 5/6           Reason for Admission:   Clarke Moreira is a 68 year old male with hx a-fib, HFpEF, HTN, prior CVA (noted on imaging), PAD, DM2, diabetic foot ulcers, hypothyroidism, pulmonary HTN, THONG, and recent dental extraction (2/26/19) who was admitted to [facility] 3/6 for sepsis and MICHELLE. Subsequently diagnosed with maxillary sinusitis (completed abx 3/12). MICHELLE resolved with IVF. Hospital stay c/b a-fib with RVR. Transferred to TCU for rehab.          Discharge Diagnoses:   Afib  DM2  Diabetic foot ulcers  HFpEF  HTN  Hypothyroidism  Depression w/ SI  Urinary retention  Hypokalemia  THONG         Significant Imaging:   None while onTCU       Procedures:   None while on TCU        Consultations:   PT/OT         Rehab Course:   1. A-fib:  HR well controlled. [id number] 5, on coumadin. INR therapeutic.   - Cont coumadin 12.5 mg daily 5x/wk, 10mg 2x/wk  - Cont metoprolol XL 100mg daily   - Ziopatch on discharge   - INR check 4/8     2. DM2:  A1C 6.1% on 2/26/19. On metformin 500mg BID. Sugars well controlled. No changes to current regimen.      3. Diabetic foot ulcers:  Follows with podiatry. Overall appears to be healing.     4. HFpEF:  Previously on spironolactone and torsemide, held on hospital admission d/t sepsis and low BP. Torsemide resumed at lower dose without issue. Wt stable at TCU.   - Cont Torsemide 100 mg qam, 50 mg qpm  - Follow up in [facility] clinic 5/6     5. HTN:  Orthostatic in hospital, felt secondary to over-diuresis. Now improved. Held PTA sprinolactone and lisinopril at [facility], did not resume  at TCU given stable BP.     6. Hypothyroidism:  Cont PTA Levothyroxine 175mcg daily.      7. Depression with SI:  Psych consulted while on TCU. No   indication to start anti-depressants at this time. Patient currently denies any worsening symptoms.      8. Urinary retention:  No issues on discharge. Cont Flomax.      9. Hypokalemia:  Secondary to diuretics. Continue K-Dur 40 mg BID.     10. THONG:  Refused CPAP while inpatient, encouraged to use at home                  Physical Exam:   Blood pressure 114/60, pulse 98, temperature 96.1  F (35.6  C), temperature source Oral, resp. rate 18, height 1.829 m (6'), weight (!) 168.9 kg (372 lb 6.4 oz), SpO2 96 %.    Constitutional: healthy, alert and no distress   Cardiovascular: Irregular irregular rhythm   Respiratory: Lungs CTAB, no wheezing or crackles  Head: Head normocephalic, PERRL  Abdomen: Abdomen soft, non-tender. BS normal. No masses, organomegaly  NEURO: Gait normal. Reflexes normal and symmetric. Sensation grossly WNL.  SKIN: no suspicious lesions or rashes  JOINT/EXTREMITIES: extremities normal- no gross deformities noted, gait normal and normal muscle tone          Discharge Medications:     Current Discharge Medication List      CONTINUE these medications which have CHANGED    Details   Ascorbic Acid (VITAMIN C) POWD Take 500 mcg by mouth daily  Refills: 0    Associated Diagnoses: Adequate nutrition      metoprolol succinate ER (TOPROL-XL) 100 MG 24 hr tablet Take 1 tablet (100 mg) by mouth daily  Qty: 30 tablet, Refills: 0    Associated Diagnoses: Paroxysmal atrial fibrillation (H)      potassium chloride ER (K-DUR/KLOR-CON M) 20 MEQ CR tablet Take 2 tablets (40 mEq) by mouth 2 times daily  Qty: 60 tablet, Refills: 0    Associated Diagnoses: Chronic systolic congestive heart failure (H)      tamsulosin (FLOMAX) 0.4 MG capsule Take 1 capsule (0.4 mg) by mouth daily  Qty: 30 capsule, Refills: 0    Associated Diagnoses: Urinary retention      torsemide  (DEMADEX) 100 MG tablet Take one tab (100mg) by mouth every morning, take one half tab (50mg) by mouth every evening  Qty: 45 tablet, Refills: 0    Associated Diagnoses: (HFpEF) heart failure with preserved ejection fraction (H)      warfarin (COUMADIN) 5 MG tablet Take 12.5 mg by mouth  5x/week, Take 10 mg by mouth 2x per week  Qty: 75 tablet, Refills: 11    Associated Diagnoses: Persistent atrial fibrillation (H)         CONTINUE these medications which have NOT CHANGED    Details   ammonium lactate (LAC-HYDRIN) 12 % external cream Apply topically 2 times daily as needed for dry skin  Qty: 385 g, Refills: 0    Associated Diagnoses: Dry skin; Lichen of skin      aspirin (ASA) 81 MG tablet Take 1 tablet (81 mg) by mouth daily  Qty: 30 tablet, Refills: 0    Associated Diagnoses: Essential hypertension      atorvastatin (LIPITOR) 40 MG tablet Take 1 tablet (40 mg) by mouth daily  Qty: 30 tablet, Refills: 0    Associated Diagnoses: Type 2 diabetes mellitus without complication, without long-term current use of insulin (H)      blood glucose monitoring (ONE TOUCH DELICA) lancets Use to test blood sugars 2 times daily or as directed.  Qty: 100 each, Refills: 11    Associated Diagnoses: Diabetes mellitus, type 2 (H)      blood glucose monitoring (ONE TOUCH ULTRA MINI) meter device kit Use to test blood sugars 2 times daily or as directed.  Qty: 1 kit, Refills: 0    Associated Diagnoses: Type 2 diabetes mellitus with hyperglycemia, without long-term current use of insulin (H)      blood glucose monitoring (ONETOUCH ULTRA) test strip Use to test blood sugars 2 times daily or as directed.  Qty: 100 strip, Refills: 11    Associated Diagnoses: Diabetes mellitus, type 2 (H)      levothyroxine (SYNTHROID/LEVOTHROID) 175 MCG tablet Take 1 tablet (175 mcg) by mouth every morning (before breakfast)  Refills: 0    Associated Diagnoses: Hypothyroidism, unspecified type      metFORMIN (GLUCOPHAGE) 500 MG tablet Take 1 tablet (500 mg)  by mouth 2 times daily (with meals)  Qty: 60 tablet, Refills: 11    Associated Diagnoses: Type 2 diabetes mellitus without complication, without long-term current use of insulin (H)      multivitamin w/minerals (MULTI-VITAMIN) tablet Take 1 tablet by mouth daily  Qty: 30 each, Refills: 0    Associated Diagnoses: Type 2 diabetes mellitus without complication, without long-term current use of insulin (H)      nystatin (MYCOSTATIN) 858838 UNIT/GM external cream Apply topically 2 times daily as needed for dry skin  Refills: 0    Associated Diagnoses: Dry skin      omega 3 1000 MG CAPS Take 1 g by mouth daily  Qty: 30 capsule, Refills: 0    Associated Diagnoses: Hyperlipidemia LDL goal <100      !! order for DME Equipment being ordered: Bariatric Lift chair  Diagnosis - morbid obesity, CHF, Lymphedema    Fax to Ne from [employer] at [phone].  Qty: 1 Units, Refills: 0    Associated Diagnoses: Chronic systolic congestive heart failure (H); Lymphedema; Morbid obesity (H)      !! order for DME Equipment being ordered: Other: Velcro Compression stockings.   Treatment Diagnosis: bilateral lymphedema.  Qty: 2 each, Refills: 0    Associated Diagnoses: Lymphedema of extremity      polyethylene glycol (MIRALAX/GLYCOLAX) packet Take 17 g by mouth daily as needed for constipation  Qty: 15 packet, Refills: 0    Associated Diagnoses: Constipation, unspecified constipation type      senna-docusate (SENOKOT-S/PERICOLACE) 8.6-50 MG tablet Take 1-2 tablets by mouth 2 times daily as needed for constipation  Qty: 60 tablet, Refills: 0    Associated Diagnoses: Constipation, unspecified constipation type      vitamin B complex with vitamin C (VITAMIN  B COMPLEX) tablet Take 1 tablet by mouth daily  Refills: 0    Associated Diagnoses: Adequate nutrition      vitamin E (VITAMIN E COMPLEX) 1000 units (450 mg) capsule Take 1 capsule (1,000 Units) by mouth daily  Refills: 0    Associated Diagnoses: Adequate nutrition       !! - Potential  duplicate medications found. Please discuss with provider.      STOP taking these medications       WARFARIN NO DOSE TODAY Comments:   Reason for Stopping:                    Discharge Disposition:   Discharged to home      Code status on discharge: DNR / DNI          Discharge Instructions:     Discharge Procedure Orders   Home care nursing referral   Referral Type: Home Health Therapies & Aides   Number of Visits Requested: 1     Home Care PT Referral for Hospital Discharge   Referral Type: Home Health Therapies & Aides   Number of Visits Requested: 1     Home Care OT Referral for Hospital Discharge   Number of Visits Requested: 1     Reason for your hospital stay   Order Comments: You were admitted to [facility] due to sepsis and kidney injury. You were then transferred to TCU for therapies.     Activity   Order Comments: Your activity upon discharge: activity as tolerated     Order Specific Question Answer Comments   Is discharge order? Yes      When to contact your care team   Order Comments: Call your primary doctor if you have any of the following: temperature greater than 102 or less than 95, chest pain, shortness of breath, difficulty breathing, weight gain of >5 pounds in 24 hour period.     Adult [facility] Follow-up and recommended labs and tests   Order Comments: Follow up with primary care provider, Matthias Sanchez, within 7 days to evaluate medication change and for hospital follow- up.  The following labs/tests are recommended: BMP, CBC.    Follow up at [facility] with Jada Mckeon CNP 5/6/19    Appointments on [place] and/or [facility] (with [facility] or [facility] provider or service). Call [phone] if you haven't heard regarding these appointments within 7 days of discharge.     DNR/DNI     Order Specific Question Answer Comments   Code status determined by: Discussion with patient/legal decision maker      [facility] Greater than 48 hrs   Standing Status: Future Standing Exp. Date:  04/29/19   Order Comments: [facility] or [facility].  Patient is currently at [facility].     Order Specific Question Answer Comments   Patient should wear the monitor for 14 days    Schedule hook-up appt at [facility] [[id number]]      Diet   Order Comments: Follow this diet upon discharge: Orders Placed This Encounter      Moderate Consistent CHO Diet     Order Specific Question Answer Comments   Is discharge order? Yes         Patient seen and examined on 4/3/2019 in anticipation of discharge on 4/4/19. Time spent with patient and in coordination of discharge plan was >30 min. Discharge summary forwarded to PCP, Matthias Sanchez PA-C  Hospitalist Service  Pager: [phone]

## 2019-04-03 NOTE — PROGRESS NOTES
Per Dr. Olivas, patient to have 14 Zio monitor placed.  Diagnosis: Afib  Monitor placed: Yes  Patient Instructed: Yes  Patient verbalized understanding: Yes  Holter # [id number]  Placed by Lul Barone

## 2019-04-03 NOTE — DISCHARGE SUMMARY
Pt alert and oriented. VSS. Able to make needs known. Denies pain, denies SOB. Discharging home today, pt aware. Discharge paperwork reviewed with pt, pt signed. Sent home with medications. Medications reviewed w/ pt. Assisted getting his belongings together and getting to the lobby. His ride picked him around 1715.

## 2019-04-04 ENCOUNTER — PATIENT OUTREACH (OUTPATIENT)
Dept: FAMILY MEDICINE | Facility: CLINIC | Age: 69
End: 2019-04-04

## 2019-04-04 NOTE — PROGRESS NOTES
[facility]: Post-Discharge Note  SITUATION                                                      Admission:    Admission Date: 03/06/19   Reason for Admission: Sepsis and MICHELLE  Discharge:   Discharge Date: 04/03/19  Discharge Diagnosis: sepsis and Michelle  Discharge Service: [facility]  Discharge Plan: home     BACKGROUND                                                      Clarke Moreira is a 68 year old male with hx a-fib, HFpEF, HTN, prior CVA (noted on imaging), PAD, DM2, diabetic foot ulcers, hypothyroidism, pulmonary HTN, THONG, and recent dental extraction (2/26/19) who was admitted to [facility] 3/6 for sepsis and MICHELLE. Subsequently diagnosed with maxillary sinusitis (completed abx 3/12). MICHELLE resolved with IVF. Hospital stay c/b a-fib with RVR. Transferred to TCU for rehab.        ASSESSMENT      Discharge Assessment  Patient reports symptoms are: Improved  Does the patient have all of their medications?: Yes  Does patient know what their new medications are for?: Yes  Does patient have a follow-up appointment scheduled?: Yes  Does patient have any other questions or concerns?: No       RN spoke with patient who stated he is doing better, little exhausted this am. Home care out today togo over medications and dosings good about them    PLAN                                                      Outpatient Plan:  INR check Monday 4/8  F/u in [facility] clinic with Jada Mckeon NP 5/6         Future Appointments   Date Time Provider Department Center   4/17/2019  9:40 AM Matthias Sanchez MD [facility]   5/6/2019 12:30 PM Jada Mckeon APRN [facility]   6/11/2019 12:00 PM Jesús Lagos DPM [facility]           Elif Hartman RN

## 2019-04-09 LAB — INR PPP: 4.07 (ref 0.86–1.14)

## 2019-04-09 PROCEDURE — 85610 PROTHROMBIN TIME: CPT | Performed by: FAMILY MEDICINE

## 2019-04-10 ENCOUNTER — TELEPHONE (OUTPATIENT)
Dept: PHARMACY | Facility: CLINIC | Age: 69
End: 2019-04-10

## 2019-04-10 DIAGNOSIS — E03.9 HYPOTHYROIDISM, UNSPECIFIED TYPE: ICD-10-CM

## 2019-04-10 NOTE — TELEPHONE ENCOUNTER
Clinical Pharmacy Note     JUANPABLO Vargas called to discuss INR results. Yesterday, INR came back at 4.07 which is supratherapeutic. Alicia discussed the results with JUANPABLO Rae at [facility] and a dose (12.5 mg) was held yesterday. This was approximately a 15% decrease in weekly warfarin dose.      I spoke with Alicia and recommended she have Clarke continue his current dose 10 mg on Monday and Friday and 12.5 mg on all other days.  An INR should be drawn again on Monday 4/15.  She agreed and explained that she would call and provide Clarke the instructions.     Follow-up on Monday 4/15.     Jesika Byrd, MirtaD

## 2019-04-10 NOTE — TELEPHONE ENCOUNTER

## 2019-04-11 RX ORDER — LEVOTHYROXINE SODIUM 175 UG/1
175 TABLET ORAL
Qty: 90 TABLET | Refills: 3 | Status: SHIPPED | OUTPATIENT
Start: 2019-04-11 | End: 2019-04-17

## 2019-04-12 NOTE — TELEPHONE ENCOUNTER
===================    Pharmacy Attestation Statement:    I have verified the content of the note, which accurately reflects my assessment of the patient and the plan of care.     Tj Palumbo, PharmD.    ===================

## 2019-04-15 ENCOUNTER — TELEPHONE (OUTPATIENT)
Dept: FAMILY MEDICINE | Facility: CLINIC | Age: 69
End: 2019-04-15

## 2019-04-15 LAB — INR PPP: 3.2

## 2019-04-15 NOTE — TELEPHONE ENCOUNTER
Home care nurse Stanley Blount calling to check status of INR results; inquiring if they have been reviewed by Pharm D yet. Advised that it appears they have not been reviewed yet. Please call Stanley Blount once results have been reviewed: [phone]; okay to leave voicemail.

## 2019-04-15 NOTE — TELEPHONE ENCOUNTER
Stanley with [facility] called in INR results of 3.2. Patient is taking medication as ordered with no signs of bleeding.     (No access to smi . Phrase.)

## 2019-04-16 ENCOUNTER — TELEPHONE (OUTPATIENT)
Dept: FAMILY MEDICINE | Facility: CLINIC | Age: 69
End: 2019-04-16

## 2019-04-16 NOTE — TELEPHONE ENCOUNTER
Returned call to home care nurse to give verbal orders per protocol as requested. Nurse verbalized understanding.    Kyra Jackson RN

## 2019-04-16 NOTE — TELEPHONE ENCOUNTER
[facility] phone call message- general phone call: Stanley Blount, Nurse from [facility] calling up to request Re-Certification orders for skilled nurse visit. Her number is [phone]    Reason for call: Requesting Re-Certification Orders for skilled nurse.     Return call needed: Yes    OK to leave a message on voice mail? Yes    Primary language: English      needed? No    Call taken on April 16, 2019 at 10:24 AM by Monse Christy

## 2019-04-16 NOTE — TELEPHONE ENCOUNTER
"  Clinical Pharmacy Note  - Telephone encounter     Home care nurse calls today with a new INR result for Clarke CHINCHILLA Moreira  PCP:  Mattihas Sanchez    Home Care nurse name: Stanley    Current warfarin dose: 10 mg x 1 day and 12.5 x 6 days    New concerns: none      INR today in the home:  3.2       Assessment and Plan:  INR is above goal, but shows improvement to last reading.    Today we will lower the dose 3% and follow up in 1 week    Warfarin dose: 10 mg on Monday and Wed,  12.5mg x 5 days    Follow up date: 1 week    Please see \"[facility] Anticoagulation Flowsheet\"       Home care nurse confirms order today - repeats verbally    All medications were reviewed and found to be indicated, effective, safe and convenient unless drug therapy problems identified as described above.    Matthias Waller was provided our recommendations via routed note and Dr. Sampson was available for supervision during this visit and is the authorizing prescriber for this visit through the pharmacist collaborative practice agreement.    Tj Palumbo, Pharm.D     "

## 2019-04-17 ENCOUNTER — OFFICE VISIT (OUTPATIENT)
Dept: FAMILY MEDICINE | Facility: CLINIC | Age: 69
End: 2019-04-17
Payer: COMMERCIAL

## 2019-04-17 VITALS
WEIGHT: 315 LBS | OXYGEN SATURATION: 97 % | HEART RATE: 88 BPM | RESPIRATION RATE: 20 BRPM | DIASTOLIC BLOOD PRESSURE: 86 MMHG | BODY MASS INDEX: 44.1 KG/M2 | TEMPERATURE: 98.8 F | HEIGHT: 71 IN | SYSTOLIC BLOOD PRESSURE: 143 MMHG

## 2019-04-17 DIAGNOSIS — E11.9 TYPE 2 DIABETES MELLITUS WITHOUT COMPLICATION, WITHOUT LONG-TERM CURRENT USE OF INSULIN (H): ICD-10-CM

## 2019-04-17 DIAGNOSIS — I10 ESSENTIAL HYPERTENSION: ICD-10-CM

## 2019-04-17 DIAGNOSIS — Z85.828 HISTORY OF SKIN CANCER: ICD-10-CM

## 2019-04-17 DIAGNOSIS — A41.9 SEPSIS, DUE TO UNSPECIFIED ORGANISM: Primary | ICD-10-CM

## 2019-04-17 DIAGNOSIS — L28.0 LICHEN OF SKIN: ICD-10-CM

## 2019-04-17 DIAGNOSIS — I48.19 PERSISTENT ATRIAL FIBRILLATION (H): ICD-10-CM

## 2019-04-17 DIAGNOSIS — E78.5 HYPERLIPIDEMIA LDL GOAL <100: ICD-10-CM

## 2019-04-17 DIAGNOSIS — M25.511 RIGHT SHOULDER PAIN, UNSPECIFIED CHRONICITY: ICD-10-CM

## 2019-04-17 DIAGNOSIS — K59.00 CONSTIPATION, UNSPECIFIED CONSTIPATION TYPE: ICD-10-CM

## 2019-04-17 DIAGNOSIS — I48.0 PAROXYSMAL ATRIAL FIBRILLATION (H): ICD-10-CM

## 2019-04-17 DIAGNOSIS — Z78.9 ADEQUATE NUTRITION: ICD-10-CM

## 2019-04-17 DIAGNOSIS — L85.3 DRY SKIN: ICD-10-CM

## 2019-04-17 DIAGNOSIS — I50.30 (HFPEF) HEART FAILURE WITH PRESERVED EJECTION FRACTION (H): ICD-10-CM

## 2019-04-17 DIAGNOSIS — R33.9 URINARY RETENTION: ICD-10-CM

## 2019-04-17 DIAGNOSIS — E03.9 HYPOTHYROIDISM, UNSPECIFIED TYPE: ICD-10-CM

## 2019-04-17 LAB
% GRANULOCYTES: 69 %G (ref 40–75)
BUN SERPL-MCNC: 9.1 MG/DL (ref 7–21)
CALCIUM SERPL-MCNC: 8.8 MG/DL (ref 8.5–10.1)
CHLORIDE SERPLBLD-SCNC: 96.9 MMOL/L (ref 98–110)
CO2 SERPL-SCNC: 30.1 MMOL/L (ref 20–32)
CREAT SERPL-MCNC: 0.9 MG/DL (ref 0.7–1.3)
GFR SERPL CREATININE-BSD FRML MDRD: >90 ML/MIN/1.7 M2
GLUCOSE SERPL-MCNC: 154.1 MG'DL (ref 70–99)
GRANULOCYTES #: 5.8 K/UL (ref 1.6–8.3)
HCT VFR BLD AUTO: 46 % (ref 40–53)
HEMOGLOBIN: 13.8 G/DL (ref 13.3–17.7)
LYMPHOCYTES # BLD AUTO: 1.9 K/UL (ref 0.8–5.3)
LYMPHOCYTES NFR BLD AUTO: 23.2 %L (ref 20–48)
MCH RBC QN AUTO: 29.1 PG (ref 26.5–35)
MCHC RBC AUTO-ENTMCNC: 30 G/DL (ref 32–36)
MCV RBC AUTO: 96.9 FL (ref 78–100)
MID #: 0.7 K/UL (ref 0–2.2)
MID %: 7.8 %M (ref 0–20)
NT-PROBNP SERPL-MCNC: 1265 PG/ML (ref 0–125)
PLATELET # BLD AUTO: 285 K/UL (ref 150–450)
POTASSIUM SERPL-SCNC: 3.1 MMOL/DL (ref 3.3–4.5)
RBC # BLD AUTO: 4.75 M/UL (ref 4.4–5.9)
SODIUM SERPL-SCNC: 135.2 MMOL/L (ref 132.6–141.4)
WBC # BLD AUTO: 8.4 K/UL (ref 4–11)

## 2019-04-17 RX ORDER — TORSEMIDE 100 MG/1
100 TABLET ORAL DAILY
Qty: 30 TABLET | Refills: 11 | Status: SHIPPED | OUTPATIENT
Start: 2019-04-17 | End: 2019-09-26

## 2019-04-17 RX ORDER — WARFARIN SODIUM 5 MG/1
TABLET ORAL
Qty: 75 TABLET | Refills: 11 | Status: SHIPPED | OUTPATIENT
Start: 2019-04-17 | End: 2019-06-03

## 2019-04-17 RX ORDER — POLYETHYLENE GLYCOL 3350 17 G/17G
17 POWDER, FOR SOLUTION ORAL DAILY PRN
Qty: 15 PACKET | Refills: 0 | Status: SHIPPED | OUTPATIENT
Start: 2019-04-17 | End: 2020-05-12

## 2019-04-17 RX ORDER — NAPROXEN 500 MG/1
500 TABLET ORAL 2 TIMES DAILY WITH MEALS
Qty: 10 TABLET | Refills: 1 | Status: SHIPPED | OUTPATIENT
Start: 2019-04-17 | End: 2020-05-12

## 2019-04-17 RX ORDER — LEVOTHYROXINE SODIUM 175 UG/1
175 TABLET ORAL
Qty: 90 TABLET | Refills: 3 | Status: SHIPPED | OUTPATIENT
Start: 2019-04-17 | End: 2020-04-11

## 2019-04-17 RX ORDER — NYSTATIN 100000 U/G
CREAM TOPICAL 2 TIMES DAILY PRN
Qty: 30 G | Refills: 11 | Status: SHIPPED | OUTPATIENT
Start: 2019-04-17 | End: 2022-05-09

## 2019-04-17 RX ORDER — MULTIVIT WITH MINERALS/LUTEIN
1000 TABLET ORAL DAILY
Qty: 100 CAPSULE | Refills: 3 | Status: SHIPPED | OUTPATIENT
Start: 2019-04-17

## 2019-04-17 RX ORDER — SPIRONOLACTONE 25 MG/1
25 TABLET ORAL DAILY
Qty: 30 TABLET | Refills: 6 | Status: SHIPPED | OUTPATIENT
Start: 2019-04-17 | End: 2019-08-27

## 2019-04-17 RX ORDER — POTASSIUM CHLORIDE 1500 MG/1
40 TABLET, EXTENDED RELEASE ORAL 3 TIMES DAILY
Qty: 90 TABLET | Refills: 11 | Status: SHIPPED | OUTPATIENT
Start: 2019-04-17 | End: 2019-07-03

## 2019-04-17 RX ORDER — MULTIPLE VITAMINS W/ MINERALS TAB 9MG-400MCG
1 TAB ORAL DAILY
Qty: 30 EACH | Refills: 0 | Status: SHIPPED | OUTPATIENT
Start: 2019-04-17 | End: 2022-05-09

## 2019-04-17 RX ORDER — OMEGA-3 FATTY ACIDS/FISH OIL 300-1000MG
1 CAPSULE ORAL DAILY
Qty: 30 CAPSULE | Refills: 0 | Status: SHIPPED | OUTPATIENT
Start: 2019-04-17 | End: 2020-01-14

## 2019-04-17 RX ORDER — METOPROLOL SUCCINATE 100 MG/1
100 TABLET, EXTENDED RELEASE ORAL DAILY
Qty: 30 TABLET | Refills: 0 | Status: SHIPPED | OUTPATIENT
Start: 2019-04-17 | End: 2019-06-10

## 2019-04-17 RX ORDER — ATORVASTATIN CALCIUM 40 MG/1
40 TABLET, FILM COATED ORAL DAILY
Qty: 30 TABLET | Refills: 0 | Status: SHIPPED | OUTPATIENT
Start: 2019-04-17 | End: 2019-05-21

## 2019-04-17 RX ORDER — TAMSULOSIN HYDROCHLORIDE 0.4 MG/1
0.4 CAPSULE ORAL DAILY
Qty: 30 CAPSULE | Refills: 0 | Status: SHIPPED | OUTPATIENT
Start: 2019-04-17 | End: 2019-07-01

## 2019-04-17 RX ORDER — AMMONIUM LACTATE 12 G/100G
CREAM TOPICAL 2 TIMES DAILY PRN
Qty: 385 G | Refills: 0 | Status: SHIPPED | OUTPATIENT
Start: 2019-04-17 | End: 2019-12-13

## 2019-04-17 RX ORDER — AMOXICILLIN 250 MG
1-2 CAPSULE ORAL 2 TIMES DAILY PRN
Qty: 60 TABLET | Refills: 0 | Status: SHIPPED | OUTPATIENT
Start: 2019-04-17 | End: 2020-02-13

## 2019-04-17 ASSESSMENT — MIFFLIN-ST. JEOR: SCORE: 2515.46

## 2019-04-17 NOTE — PROGRESS NOTES
"  Hospitalization Follow-up Visit         [facility]       Hospital Follow-up Visit:    Hospital:  [facility]   Date of Admission: 06MAR2019  Date of Discharge: 23MAR to TCU, 03APR to home   Reason(s) for Admission: Sepsis, MICHELLE, sinusitis             Problems taking medications regularly:  None       Post Discharge Medication Reconciliation: discharge medications reconciled and changed, per note/orders (see AVS).       Problems adhering to non-medication therapy:  None       Medications reviewed by: by myself. Findings include low potassium on BMP this morning. Torsemide dosing reduced to 100 mg every day and Spironolactone re-started today at 25 mg every day.     Summary of hospitalization:  [facility] discharge summary reviewed  Diagnostic Tests/Treatments reviewed.  Follow up needed: none  Other Healthcare Providers Involved in Patient s Care:         Homecare, Care Coordination and Specialist appointment - Dermatology  Update since discharge: improved.  Plan of care communicated with patient                       Review of Systems:   CONSTITUTIONAL: no fatigue, no unexpected change in weight  SKIN: no worrisome rashes, no worrisome moles, no worrisome lesions  EYES: no acute vision problems or changes  ENT: no ear problems, no mouth problems, no throat problems  RESP: no significant cough, no shortness of breath  CV: no chest pain, no palpitations, no new or worsening peripheral edema  GI: no nausea, no vomiting, no constipation, no diarrhea            Physical Exam:     Vitals:    04/17/19 0934 04/17/19 0936   BP: 153/86 143/86   Pulse: 88    Resp: 20    Temp: 98.8  F (37.1  C)    SpO2: 97%    Weight: (!) 172.7 kg (380 lb 12.8 oz)    Height: 1.797 m (5' 10.75\")      Body mass index is 53.49 kg/m .    GENERAL: NAD, aged-appropriate, good historian   ORAL CAVITY/THROAT: missing cap on right upper molar, pink moist an intact, tenderness to left-side of maxillary sinus   NECK: no tenderness, " no adenopathy, no asymmetry, no masses, no stiffness; thyroid- normal to palpation  RESP: lungs clear to auscultation - no rales, no rhonchi, no wheezes  CV: regular rates and rhythm, normal S1 S2, no S3 or S4 and no murmur, no click or rub -  ABDOMEN: soft, no tenderness, no  hepatosplenomegaly, no masses, normal bowel sounds         Results:     Results from the last 24 hours   Results for orders placed or performed in visit on 04/17/19 (from the past 24 hour(s))   Basic Metabolic Panel ([facility])   Result Value Ref Range    Urea Nitrogen 9.1 7.0 - 21.0 mg/dL    Calcium 8.8 8.5 - 10.1 mg/dL    Chloride 96.9 (L) 98.0 - 110.0 mmol/L    Carbon Dioxide 30.1 20.0 - 32.0 mmol/L    Creatinine 0.9 0.7 - 1.3 mg/dL    Glucose 154.1 (H) 70.0 - 99.0 mg'dL    Potassium 3.1 (L) 3.3 - 4.5 mmol/dL    Sodium 135.2 132.6 - 141.4 mmol/L    GFR Estimate >90 >60.0 mL/min/1.7 m2    GFR Estimate If Black >90 >60.0 mL/min/1.7 m2   CBC with Diff Plt ([facility])   Result Value Ref Range    WBC 8.4 4.0 - 11.0 K/uL    Lymphocytes # 1.9 0.8 - 5.3 K/uL    % Lymphocytes 23.2 20.0 - 48.0 %L    Mid # 0.7 0.0 - 2.2 K/uL    Mid % 7.8 0.0 - 20.0 %M    GRANULOCYTES # 5.8 1.6 - 8.3 K/uL    % Granulocytes 69.0 40.0 - 75.0 %G    RBC 4.75 4.40 - 5.90 M/uL    Hemoglobin 13.8 13.3 - 17.7 g/dL    Hematocrit 46.0 40.0 - 53.0 %    MCV 96.9 78.0 - 100.0 fL    MCH 29.1 26.5 - 35.0 pg    MCHC 30.0 (L) 32.0 - 36.0 g/dL    Platelets 285.0 150.0 - 450.0 K/uL       Assessment and Plan      1. Sepsis, due to unspecified organism (H)  Likely due to maxillary sinusitis and infection at the molar extraction site. Treated Vancomycin, Zosyn and then stepped down to Augmentin whilst inpatient. Hypotensive and tachycardic at the beginning of the admission but remained afebrile. Negative blood cultures and UA during infectious work-up. Hemodynamically stable at discharge and has remained stable since discharge.     - Nothing to do     2. (HFpEF) heart failure with preserved  ejection fraction (H)  Chronic condition exacerbated by poor PO intake days prior to admission despite continued diuretic use. Echo showed no vegitation, and EF of 50-60%. Due to pre-syncopal symptoms (dizziness/lightheadednes, orthostasis, blurry vision) his PTA diuretics and BB were held early into the admission. Upon discharge, only his Torsemide and Metoprolol were re-started. Now with low K+ (3.1).     Will reduce torsemide 100/50 to 100/0 and begin spironolactone 25mg qPM for K sparing component.   One week of extra potassium (6 tabs instead of 4 tabs/day)    - Basic Metabolic Panel ([facility]) - remarkable for hypokalemia (3.1)   - N terminal pro BNP outpatient   - CBC with Diff Plt ([facility]) - unremarkable   - Torsemide (DEMADEX) 100 MG tablet: Take one tab (100mg) by mouth every morning and discontinue taking the half tablet in the evening. Dispense: 30 tablet; Refill: 11  - Potassium Chloride ER (K-DUR/KLOR-CON M) 20 MEQ CR tablet; Take 2 tablets (40 mEq) by mouth 3 times daily X 1 week , then 2 tablets 2 times daily ongoing.  Dispense: 90 tablet; Refill: 11  - Spironolactone (ALDACTONE) 25 MG tablet; Take 1 tablet (25 mg) by mouth daily  Dispense: 30 tablet; Refill: 6    3. Paroxysmal atrial fibrillation (H)  Complicated by infection and intravascular volume status likely leading to RVR (160's-170's) early into the admission. Volume repletion and re-starting of BB regained control of rate to an stable level. Sent home on Holter monitor per EP's recommendation. Asymptomatic since discharge.      Zio patch package mailed from clinic    - Metoprolol Succinate ER (TOPROL-XL) 100 MG 24 hr tablet; Take 1 tablet (100 mg) by mouth daily  Dispense: 30 tablet; Refill: 0    4. Persistent atrial fibrillation (H)  Chronic problem that is stable when rate is adequately controlled.   - warfarin (COUMADIN) 5 MG tablet; Take 12.5 mg by mouth  5x/week, Take 10 mg by mouth 2x per week  Dispense: 75 tablet; Refill: 11  -  INR; Future    5. Essential hypertension  Has been stable as an outpatient, but post-discharge pressures have been higher than normal (130's-140's). Will trial addition of Spironolactone to regimen    - aspirin (ASA) 81 MG tablet; Take 1 tablet (81 mg) by mouth daily  Dispense: 30 tablet; Refill: 0    6. Right shoulder pain, unspecified chronicity  Chronic problem treated in the past with rehabilitation exercises provided by OT. Exacerbated during admission and treated with 500 mg Naproxen for 5 days whilst inpatient with resolution of symptoms. Pain in shoulder returned once Mr. Moreira returned home, and he has inquired about getting another trial of Naproxen.    - naproxen (NAPROSYN) 500 MG tablet; Take 1 tablet (500 mg) by mouth 2 times daily (with meals)  Dispense: 10 tablet; Refill: 1    7. Type 2 diabetes mellitus without complication, without long-term current use of insulin (H)  Last A1c was 6.1 in February. Adequately controlled both as and outpatient and while inpatient. No changes made.    - multivitamin w/minerals (MULTI-VITAMIN) tablet; Take 1 tablet by mouth daily  Dispense: 30 each; Refill: 0  - metFORMIN (GLUCOPHAGE) 500 MG tablet; Take 1 tablet (500 mg) by mouth 2 times daily (with meals)  Dispense: 60 tablet; Refill: 11  - blood glucose (ONETOUCH ULTRA) test strip; Use to test blood sugars 2 times daily or as directed.  Dispense: 100 strip; Refill: 11  - blood glucose monitoring (ONE TOUCH DELICA) lancets; Use to test blood sugars 2 times daily or as directed.  Dispense: 100 each; Refill: 11  - atorvastatin (LIPITOR) 40 MG tablet; Take 1 tablet (40 mg) by mouth daily  Dispense: 30 tablet; Refill: 0  - order for DME; Equipment being ordered: Custom diabetic shoes  Dispense: 1 Units; Refill: 0    8. Refills  Adequate nutrition  - Vtamin E (VITAMIN E COMPLEX) 1000 units (450 mg) capsule; Take 1 capsule (1,000 Units) by mouth daily  Dispense: 100 capsule; Refill: 3  - Vitamin B complex with vitamin C  (VITAMIN  B COMPLEX) tablet; Take 1 tablet by mouth daily  Dispense: 100 tablet; Refill: 3  Urinary retention  - tamsulosin (FLOMAX) 0.4 MG capsule; Take 1 capsule (0.4 mg) by mouth daily  Dispense: 30 capsule; Refill: 0  Constipation, unspecified constipation type  - Senna-docusate (SENOKOT-S/PERICOLACE) 8.6-50 MG tablet; Take 1-2 tablets by mouth 2 times daily as needed for constipation  Dispense: 60 tablet; Refill: 0  - Polyethylene glycol (MIRALAX/GLYCOLAX) packet; Take 17 g by mouth daily as needed for constipation  Dispense: 15 packet; Refill: 0  Hyperlipidemia LDL goal <100  - Omega 3 1000 MG CAPS; Take 1 g by mouth daily  Dispense: 30 capsule; Refill: 0  Dry skin  - Nystatin (MYCOSTATIN) 500065 UNIT/GM external cream; Apply topically 2 times daily as needed for dry skin  Dispense: 30 g; Refill: 11  - Ammonium Lactate (LAC-HYDRIN) 12 % external cream; Apply topically 2 times daily as needed for dry skin  Dispense: 385 g; Refill: 0  Hypothyroidism, unspecified type  - Levothyroxine (SYNTHROID/LEVOTHROID) 175 MCG tablet; Take 1 tablet (175 mcg) by mouth every morning (before breakfast)  Dispense: 90 tablet; Refill: 3  Lichen of skin  - Ammonium Lactate (LAC-HYDRIN) 12 % external cream; Apply topically 2 times daily as needed for dry skin  Dispense: 385 g; Refill: 0    9. History of skin cancer  Wants to follow-up with biopsy that was taken of a lesion on the right side of his forehead recently.    - DERMATOLOGY REFERRAL - INTERNAL    Options for treatment and follow-up care were reviewed with the patient  Clarke RENÉE SolomonMoreira   engaged in the decision making process and verbalized understanding of the options discussed and agreed with the final plan.      Von Sanchez MD    I was present with the medical student who participated in the service and in the documentation of this note. I have verified the history and personally performed the physical exam and medical decision making, and have verified  the content of the note, which accurately reflects my assessment of the patient and the plan of care.   Matthias Sanchez MD

## 2019-04-17 NOTE — PROGRESS NOTES
HPI               Problem, Medication and Allergy Lists were {Reviewed Lists:535134}.    Patient is an established patient of this clinic..         Review of Systems:   Review of Systems         Physical Exam:        Physical Exam  {  Detailed Ortho and mental status exam:559026}    Results:   {UMP FM result choices :537827}    Assessment and Plan        {Assessment/Plan Pick List :914437}       There are no discontinued medications.    Options for treatment and follow-up care were reviewed with the patient. Clarke Moreira  engaged in the decision making process and verbalized understanding of the options discussed and agreed with the final plan.    Matthias Sanchez MD

## 2019-04-17 NOTE — LETTER
April 19, 2019      Clarke Moreira  [address]  [facility] [id number]        Dear Clarke,    Thank you for getting your care at [facility]. Please see below for your test results.  N terminal Pro BNP (heart failure marker) is better than the past. Good news.  Low potassium as discussed  Blood counts good.    Resulted Orders   Basic Metabolic Panel (\[facility] [place] [facility]\"")   Result Value Ref Range    Urea Nitrogen 9.1 7.0 - 21.0 mg/dL    Calcium 8.8 8.5 - 10.1 mg/dL    Chloride 96.9 (L) 98.0 - 110.0 mmol/L    Carbon Dioxide 30.1 20.0 - 32.0 mmol/L    Creatinine 0.9 0.7 - 1.3 mg/dL    Glucose 154.1 (H) 70.0 - 99.0 mg'dL    Potassium 3.1 (L) 3.3 - 4.5 mmol/dL    Sodium 135.2 132.6 - 141.4 mmol/L    GFR Estimate >90 >60.0 mL/min/1.7 m2    GFR Estimate If Black >90 >60.0 mL/min/1.7 m2   N terminal pro BNP outpatient   Result Value Ref Range    N-Terminal Pro Bnp 1,265 (H) 0 - 125 pg/mL      Comment:         Reference range shown and results flagged as abnormal are for the outpatient,   non acute settings. Establishing a baseline value for each individual patient   is useful for follow-up.  Suggested inpatient cut points for confirming diagnosis of CHF in an acute   setting are:   >450 pg/mL (age 18 to less than 50)   >900 pg/mL (age 50 to less than 75)   >1800 pg/mL (75 yrs and older)  An inpatient or emergency department NT-proPBNP <300 pg/mL effectively rules   out acute CHF, with 99% negative predictive value.      CBC with Diff Plt (\[facility]\"")   Result Value Ref Range    WBC 8.4 4.0 - 11.0 K/uL    Lymphocytes # 1.9 0.8 - 5.3 K/uL    % Lymphocytes 23.2 20.0 - 48.0 %L    Mid # 0.7 0.0 - 2.2 K/uL    Mid % 7.8 0.0 - 20.0 %M    GRANULOCYTES # 5.8 1.6 - 8.3 K/uL    % Granulocytes 69.0 40.0 - 75.0 %G    RBC 4.75 4.40 - 5.90 M/uL    Hemoglobin 13.8 13.3 - 17.7 g/dL    Hematocrit 46.0 40.0 - 53.0 %    MCV 96.9 78.0 - 100.0 fL    MCH 29.1 26.5 - 35.0 pg    MCHC 30.0 (L) 32.0 - 36.0 g/dL    Platelets 285.0 150.0 -  450.0 K/uL           Sincerely,    Matthias Sanchez MD

## 2019-04-17 NOTE — PATIENT INSTRUCTIONS
"Big changes in meds  1. Torsemide      -- continue 100mg in AM     -- stop 50mg (1/2 tablet) in PM    2. Spironolactone  Add spironolactone 25mg daily (in PM would be best)    3. Potassium  Increase potassium from 2 tabs twice a day ---> 2 tabs three times a day for 1 week, then return to twice a day.       4. Naprosyn x 5 days for shoulder pain    Shoes to be made  Derm referral placed    [facility]    Dermatology referral  [phone]  \"Scheduled 5/1\"    "

## 2019-04-21 ENCOUNTER — MEDICAL CORRESPONDENCE (OUTPATIENT)
Dept: HEALTH INFORMATION MANAGEMENT | Facility: CLINIC | Age: 69
End: 2019-04-21

## 2019-04-22 ENCOUNTER — TELEPHONE (OUTPATIENT)
Dept: CARDIOLOGY | Facility: CLINIC | Age: 69
End: 2019-04-22

## 2019-04-22 NOTE — TELEPHONE ENCOUNTER
Received a call from [employer] for an abnormal report.    Per Fara from [employer] [facility], this pt wore a patch from the 3rd of April until the 16th.    % of wear time with avg rate of 197 BPM for 1 min. Strips are posted.    On strips Dr. Romero has already reviewed the report.      Cat Case, CMA

## 2019-04-23 ENCOUNTER — HOSPITAL ENCOUNTER (OUTPATIENT)
Dept: PHYSICAL THERAPY | Facility: CLINIC | Age: 69
Setting detail: THERAPIES SERIES
End: 2019-04-23
Attending: FAMILY MEDICINE
Payer: COMMERCIAL

## 2019-04-23 DIAGNOSIS — I50.22 CHRONIC SYSTOLIC CONGESTIVE HEART FAILURE (H): ICD-10-CM

## 2019-04-23 PROCEDURE — 97161 PT EVAL LOW COMPLEX 20 MIN: CPT | Mod: GP | Performed by: PHYSICAL THERAPIST

## 2019-04-23 PROCEDURE — 97140 MANUAL THERAPY 1/> REGIONS: CPT | Mod: GP | Performed by: PHYSICAL THERAPIST

## 2019-04-23 PROCEDURE — 97110 THERAPEUTIC EXERCISES: CPT | Mod: GP | Performed by: PHYSICAL THERAPIST

## 2019-04-23 PROCEDURE — 97535 SELF CARE MNGMENT TRAINING: CPT | Mod: GP | Performed by: PHYSICAL THERAPIST

## 2019-04-23 NOTE — PROGRESS NOTES
[facility]        OUTPATIENT PHYSICAL THERAPY EDEMA EVALUATION  PLAN OF TREATMENT FOR OUTPATIENT REHABILITATION  (COMPLETE FOR INITIAL CLAIMS ONLY)  Patient's Last Name, First Name, Clarke Pfeiffer                           Provider s Name:   [facility] Medical Record No.  [id number]     Start of Care Date:  04/23/19   Onset Date:  09/23/17   Type:  PT   Medical Diagnosis:  Lymphedema   Therapy Diagnosis:  lymphedema Visits from SOC:  1                                     __________________________________________________________________________________   Plan of Treatment/Functional Goals:    Manual lymph drainage, Exercises, Precautions to prevent infection / exacerbation, Education, ADL training, Home management program development        GOALS  1. Goal description: Pt will prevent exacerbation of lymphedema and recognize edema that may be indicative of medical attention needs through daily home management with skin care, exercise and self massage.       Target date: 07/21/19  2. Goal description: PT will have 2kg decreased weight with adherance to lifestyle recommendations.       Target date: 05/22/19              Treatment frequency: Other   Treatment duration: 4 visits over 90 days    Charlotte Collado, PT                                    I CERTIFY THE NEED FOR THESE SERVICES FURNISHED UNDER        THIS PLAN OF TREATMENT AND WHILE UNDER MY CARE     (Physician co-signature of this document indicates review and certification of the therapy plan).                   Certification date from: 04/23/19       Certification date to: 07/21/19           Referring physician: Dr. Jesús Olivas MD   Initial Assessment  See Epic Evaluation- Start of care: 04/23/19

## 2019-04-23 NOTE — PROGRESS NOTES
"   04/23/19 1000   Quick Adds   Quick Adds Certification   Rehab Discipline   Discipline PT   Type of Visit   Type of visit Initial Edema Evaluation       present No   General Information   Start of care 04/23/19   Referring physician Dr. Jesús Olivas MD   Orders Evaluate and treat as indicated   Order date 03/06/19   Medical diagnosis CHF, lymphedema   Onset of illness / date of surgery 03/06/19   Edema onset 09/23/17   Affected body parts Trunk;RLE;LLE;Genitals   Edema etiology Unknown  (CHF, MICHELLE)   Edema etiology comments Pt reports that he is not sure why he developed swelling, didn't know that he has swelling back in 2017 until he fell and couldn't get up and at the hospital \"they took 100lb of fluid off me.\"   Pertinent history of current problem (PT: include personal factors and/or comorbidities that impact the POC; OT: include additional occupational profile info) Pt hospitalized with sepsis, MICHELLE, Afib 3/6/19-3/22/19.  He went to rehab for 3/22-4/3/19.  The kideys have recovered.  Pt feels that his weight is about what it was   Surgical / medical history reviewed Yes   Prior level of functional mobility Prior to September 2017, pt did not use assistive device   Prior treatment Compression garments;Gradient compression bandaging;Elevation;Diuretics   Patient role / employment history Disabled;Retired   Psychosocial concerns Financial concerns   Living environment Apartment / condo   Current assistive devices Front wheeled walker   General observations Pt is wearing his velcro wraps, wearing them at night sometimes   Fall Risk Screen   Fall screen completed by PT   Have you fallen 2 or more times in the past year? No   Have you fallen and had an injury in the past year? No   Is patient a fall risk? Yes   Fall screen comments Near falls. Pt continues to complete a home program.   Subjective Report   Patient report of symptoms In the hospital, they told me I should come here for my trunk " swelling, the legs are really good now   Precautions   Precautions Cardiac Edema/CHF;Renal Insufficiency   Precautions comments Pt reports that the kidneys are better   Patient / Family Goals   Patient / family goals statement Pt would like to get fluid off the trunk   Pain   Patient currently in pain Yes   Pain location knees, ankles, shoulders   Pain comments Pain limits walking and exercise   Vitals Signs   Weight 172.7kg  (4/17/19)   Cognitive Status   Orientation Orientation to person, place and time   Level of consciousness Alert   Follows commands and answers questions 100% of the time   Personal safety and judgement Intact   Memory Intact   Edema Exam / Assessment   Skin condition Non-pitting;Dryness;Intact   Skin condition comments lower legs and feet not assessed as pt here for his trunk and thigh swelling. B thighs are dry, non-pitting.  Abdomen is soft. No fibrosis noted.   Ulceration comments R Toe healing wound.   Volume LE   Right LE (mL)   (Thigh 10cm above mid patella 64.5cm)   Left LE (mL)   (L thigh 66.5cm)   Trunk volume comments navel girth 151cm   Genital volume comments Pt reports penis inverted due to swelling around it.  Does not have scrotal swelling to point of discomfort with ambulation   Range of Motion   ROM comments ankle and knee ROM impaired due to arthritis, shoulder elevation impaired. Pt reports being less flexible to reach around to has armpits but it's girth that limits him, not flexibility   Strength   Strength Other   Strength comments decreased strength to manage load of mobility   Posture   Posture Forward head position   Posture comments flexed hips   Activities of Daily Living   Activities of Daily Living Independent- dons velcro wraps himself, sponge baths   Bed Mobility   Bed mobility Independent, with hospital bed   Transfers   Transfers Independent, slow from low chair in lobby   Gait / Locomotion   Gait / Locomotion Independent with walker, limited distance but does  household.   Sensory   Sensory perception comments neuropathy in feet, numbness   Coordination   Coordination Gross motor coordination appropriate   Muscle Tone   Muscle tone No deficits were identified   Planned Edema Interventions   Planned edema interventions Manual lymph drainage;Exercises;Precautions to prevent infection / exacerbation;Education;ADL training;Home management program development   Clinical Impression   Criteria for skilled therapeutic intervention met Yes   Therapy diagnosis lymphedema   Influenced by the following impairments / conditions Stage 2;Edema   Functional limitations due to impairments / conditions mobility, adl's for dressing and bathing   Clinical Presentation Stable/Uncomplicated   Clinical Presentation Rationale Pt with decreased edema than in past, managing lower legs well at home   Clinical Decision Making (Complexity) Low complexity   Treatment frequency Other   Treatment duration 4 visits over 90 days   Patient / family and/or staff in agreement with plan of care Yes   Risks and benefits of therapy have been explained Yes   Clinical impression comments Pt presents with history of truncal edema that now is better controlled.  He will benefit from education and home program to prevent exacerbation.   Education Assessment   Preferred learning style Listening;Reading;Demonstration;Pictures / video   Barriers to learning Physical;Financial   Goals   Edema Eval Goals 1;2;3   Goal 1   Goal identifier Home Program   Goal description Pt will prevent exacerbation of lymphedema and recognize edema that may be indicative of medical attention needs through daily home management with skin care, exercise and self massage.   Target date 07/21/19   Goal 2   Goal identifier Weight   Goal description PT will have 2kg decreased weight with adherance to lifestyle recommendations.   Target date 05/22/19   Total Evaluation Time   PT Eval, Low Complexity Minutes (28812) 15   Certification    Certification date from 04/23/19   Certification date to 07/21/19   Medical Diagnosis Lymphedema

## 2019-04-25 ENCOUNTER — OFFICE VISIT (OUTPATIENT)
Dept: PSYCHOLOGY | Facility: CLINIC | Age: 69
End: 2019-04-25
Payer: COMMERCIAL

## 2019-04-25 DIAGNOSIS — F41.1 GAD (GENERALIZED ANXIETY DISORDER): ICD-10-CM

## 2019-04-25 DIAGNOSIS — F33.1 MODERATE EPISODE OF RECURRENT MAJOR DEPRESSIVE DISORDER (H): Primary | ICD-10-CM

## 2019-04-25 NOTE — PROGRESS NOTES
"Behavioral Health Progress Note    Client Legal Name: Clarke Moreira   Client Preferred Name: Clarke   Service Type: Individual  Length of Visit: 45 minutes  Attendees: patient     Identifying Information and Presenting Problem:    The patient is a 68 year old American male who is being seen for problematic symptoms of depression, ongoing adjustment to medical illness, as well as social isolation.  Recently cancelled an appointment, but returned today even though he was debating cancelling this appointment as well.   Finds it difficult to cope with the transition from improved mood to decreased mood following an appointment.    Treatment Objective(s) Addressed in This Session:    reports one goal is maintaining healthy eating patterns, feels as if he is returning to old patterns before he went into the hospital and believes this is indicative of his mental disease, doesn't want to do physical activity because then he has to leave his home.    Progress on / Status of Treatment Objective(s) / Homework:  It's been a long winter with ongoing struggles. Has not been able to make it in several times due to weather.  Recently hospitalized.      PHQ-9 SCORE 8/28/2018 10/22/2018 12/11/2018   PHQ-9 Total Score - - -   PHQ-9 Total Score 24 21 25       CHRIS-7 SCORE 8/28/2018 10/22/2018 12/11/2018   Total Score - - -   Total Score 18 13 20     Topics Discussed/Interventions Provided:  Clarke was recently hospitalized on two different stations for a little over 3 weeks. He felt very well-cared for, but was also lamenting that he did not experience the \"high\" and perspective changing thoughts like he did during that hospitalization in November 2017.  Feels ashamed that he was hoping to have those feelings and thoughts again about a hospitalization.  Even though there was lots of positive feedback and personalized attention from the staff, he felt more like the treatment is because this is their job, not because of him as a " person.  Fully acknowledges that this perception is his own and not from anything that those caring for him did.  Validated and empathized with his experience. Also gently challenged some of his thinking. Discussed the skills he has to connect with others even though he does not recognize these skills.  This situation also highlighted for him how much he desires connection, but does not feel safe to seek that connection anywhere other than in situations like this where the roles are prescribed.      He states his only ambition in life is to try to stay safe from criticism.  Explored the narrative that has been created by his life and the early experiences with his mother. Also explored how he can make changes to this narrative now.      Assessment: The patient appeared to be active and engaged in today's session and was receptive to feedback.    Mental Status: Clarke appeared alert and oriented. He was dressed in shorts, a shirt, and his biker vest.  He was well-groomed.  Clarke is engaged and pleasant throughout the session.  Eye contact was good, took his sunglasses off for the session. Speech was normal rate and rhythm.  Mood was described as sad.  Affect was congruent with stated mood and thoughts.  Thought processes were circumstantial at times.  Thought content was WNL with no evidence of psychotic or paranoid features. No evidence of SI/HI or self-harm, intent, or plans. Memory appeared grossly intact. Insight and judgment appeared good and patient exhibited good impulse control during the appointment.     Does the patient appear to be at imminent risk of harm to self/others at this time? No    The session was necessary to identify and challenge unhelpful thinking that occurs regarding his perception of himself, as well as how he thinks others view him.  Symptoms of depression have continued to interfere with his ability to function at home and socially.  Ongoing psychotherapy is necessary to provide  counseling and provide support.    Diagnosis (DSM-5):  Major Depression, recurrent, moderate  Generalized Anxiety Disorder  R/o persistent depressive disorder    Plan:  1. Follow up in 2 weeks.  2. Do tx plan at next visit.    NOTE: Treatment plan needed.  Diagnostic assessment update due 10/15/19.

## 2019-04-27 ENCOUNTER — TELEPHONE (OUTPATIENT)
Dept: FAMILY MEDICINE | Facility: CLINIC | Age: 69
End: 2019-04-27

## 2019-04-27 NOTE — TELEPHONE ENCOUNTER
Returned call to Karla at [facility] who relayed to me that Clarke's INR result was 3.7. He is on Warfarin for A-fib and his dosing schedule is 10 mg every Monday and Friday, and 12.5 mg every other day. I recommended he skip tonight's dose and take his usual dose tomorrow (12.5 mg) and then have his care team call our clinic on Monday to check in with Dr. Sanchez or our PharmD for further management of his Warfarin dosing. Karla relayed the plan to Clarke while I was on the phone with her and patient was agreeable to the plan. Karla reported back to me that she would be in touch with patient's care coordinator to call our clinic Monday 4/29.    Lio Hodge MD  [facility]  PGY1, [phone]

## 2019-04-29 ENCOUNTER — TELEPHONE (OUTPATIENT)
Dept: FAMILY MEDICINE | Facility: CLINIC | Age: 69
End: 2019-04-29

## 2019-04-29 NOTE — TELEPHONE ENCOUNTER
Message routed to Pharm D and PCP to advise coumadin dosing, please see telephone encounter on 4/27/2019.    Bladimir Campbell RN

## 2019-04-29 NOTE — TELEPHONE ENCOUNTER
[facility] phone call message - order or referral request for patient:     Order or referral being requested: Orders    Additional Comments: Home care nurse unable to reach patient for INR on Friday, requesting further orders for coumadin.     OK to leave a message on voice mail? Yes    Primary language: English      needed? No    Call taken on April 29, 2019 at 10:25 AM by Susan Saenz

## 2019-04-29 NOTE — TELEPHONE ENCOUNTER
[facility] nurse called with INR of 2.57. Would like a call back with the Coumadin dose to [phone].

## 2019-04-29 NOTE — TELEPHONE ENCOUNTER
PHARMACY TELEPHONE ENCOUNTER:    Reason: INR home 2.7      Pt was hospitalized over the weekend.  INR was elevated and held on Saturday and Sunday.  No signs of bruising or bleeding.  Pt is not taking any antibioitics.  A/P  INR was above goal.  After holding x 2 days will now restart at 10 mg daily. Recheck on Friday when he returns to the clinic.    Directions were repeated by Nurse Nichole.       Matthias Koch  was the authorizing prescriber for this visit through the pharmacist collaborative practice agreement.    Tj Palumbo Pharm.D.      Tj Palumbo, Pharm.D.

## 2019-05-01 DIAGNOSIS — I50.22 CHRONIC SYSTOLIC CONGESTIVE HEART FAILURE (H): Primary | ICD-10-CM

## 2019-05-09 DIAGNOSIS — I50.22 CHRONIC SYSTOLIC CONGESTIVE HEART FAILURE (H): Primary | ICD-10-CM

## 2019-05-10 ENCOUNTER — OFFICE VISIT (OUTPATIENT)
Dept: FAMILY MEDICINE | Facility: CLINIC | Age: 69
End: 2019-05-10
Payer: COMMERCIAL

## 2019-05-10 VITALS
BODY MASS INDEX: 54.05 KG/M2 | OXYGEN SATURATION: 96 % | DIASTOLIC BLOOD PRESSURE: 75 MMHG | TEMPERATURE: 98.5 F | SYSTOLIC BLOOD PRESSURE: 130 MMHG | WEIGHT: 315 LBS | HEART RATE: 84 BPM

## 2019-05-10 DIAGNOSIS — E87.6 HYPOKALEMIA: ICD-10-CM

## 2019-05-10 DIAGNOSIS — I48.20 CHRONIC ATRIAL FIBRILLATION (H): ICD-10-CM

## 2019-05-10 DIAGNOSIS — C44.90 SKIN CANCER: ICD-10-CM

## 2019-05-10 DIAGNOSIS — I10 ESSENTIAL HYPERTENSION WITH GOAL BLOOD PRESSURE LESS THAN 140/90: ICD-10-CM

## 2019-05-10 DIAGNOSIS — I50.30 (HFPEF) HEART FAILURE WITH PRESERVED EJECTION FRACTION (H): ICD-10-CM

## 2019-05-10 DIAGNOSIS — I96 TOE GANGRENE (H): Primary | ICD-10-CM

## 2019-05-10 DIAGNOSIS — E11.65 TYPE 2 DIABETES MELLITUS WITH HYPERGLYCEMIA, WITHOUT LONG-TERM CURRENT USE OF INSULIN (H): ICD-10-CM

## 2019-05-10 LAB
BUN SERPL-MCNC: 18.7 MG/DL (ref 7–21)
CALCIUM SERPL-MCNC: 9.1 MG/DL (ref 8.5–10.1)
CHLORIDE SERPLBLD-SCNC: 101.2 MMOL/L (ref 98–110)
CO2 SERPL-SCNC: 26.1 MMOL/L (ref 20–32)
CREAT SERPL-MCNC: 1.1 MG/DL (ref 0.7–1.3)
GFR SERPL CREATININE-BSD FRML MDRD: 70.8 ML/MIN/1.7 M2
GLUCOSE SERPL-MCNC: 133.1 MG'DL (ref 70–99)
INR PPP: 2.5
POTASSIUM SERPL-SCNC: 4.2 MMOL/DL (ref 3.3–4.5)
SODIUM SERPL-SCNC: 137 MMOL/L (ref 132.6–141.4)

## 2019-05-10 ASSESSMENT — ANXIETY QUESTIONNAIRES
2. NOT BEING ABLE TO STOP OR CONTROL WORRYING: MORE THAN HALF THE DAYS
7. FEELING AFRAID AS IF SOMETHING AWFUL MIGHT HAPPEN: NEARLY EVERY DAY
3. WORRYING TOO MUCH ABOUT DIFFERENT THINGS: MORE THAN HALF THE DAYS
5. BEING SO RESTLESS THAT IT IS HARD TO SIT STILL: NOT AT ALL
GAD7 TOTAL SCORE: 13
1. FEELING NERVOUS, ANXIOUS, OR ON EDGE: MORE THAN HALF THE DAYS
IF YOU CHECKED OFF ANY PROBLEMS ON THIS QUESTIONNAIRE, HOW DIFFICULT HAVE THESE PROBLEMS MADE IT FOR YOU TO DO YOUR WORK, TAKE CARE OF THINGS AT HOME, OR GET ALONG WITH OTHER PEOPLE: SOMEWHAT DIFFICULT
6. BECOMING EASILY ANNOYED OR IRRITABLE: MORE THAN HALF THE DAYS

## 2019-05-10 ASSESSMENT — PATIENT HEALTH QUESTIONNAIRE - PHQ9
SUM OF ALL RESPONSES TO PHQ QUESTIONS 1-9: 22
5. POOR APPETITE OR OVEREATING: MORE THAN HALF THE DAYS

## 2019-05-10 NOTE — PROGRESS NOTES
HPI  68 year old male here for evaluation of several issues.    1. CHF / HypoK  Patient appears to have stable CHF.    Last visit we reduce furosemide and began Spironolactone.  He has gained about 4 pounds since his last visit.  No other evidence of CHF.  No hypokalemia on this regiment.    2. Hypotension  Patient has had no presyncopal episodes.  He feels great with the blood pressure at its present level.    3. R toe gangrene  Patient had a debridement done when he was in the hospital.  It appears to be worsening again.  He has no pain.  The tip of the toe is beginning to turn black in certain areas.  There is no swelling or erythema to suggest underlying infection.    4. Anticoagulation  Patient taking Coumadin 10 mg daily (instead of 12.5 mg on certain days).  INR is 2.5    5. DME Diabetes shoes  Patient has not heard anything from the orthotics department regarding his diabetes shoes.  We will give him a new prescription for shoes.    6. Skin cancer - forehead  Patient is trying to schedule an appointment with dermatology for his skin cancer on his forehead.  He was told on the phone to consider going back to the place that to the biopsy.  I have asked him to continue with making an appointment with our dermatology department.          ROS  6 point ROS neg other than the symptoms noted above in the HPI.    MEDS  Current Outpatient Medications   Medication     ammonium lactate (LAC-HYDRIN) 12 % external cream     Ascorbic Acid (VITAMIN C) POWD     aspirin (ASA) 81 MG tablet     atorvastatin (LIPITOR) 40 MG tablet     blood glucose (ONETOUCH ULTRA) test strip     blood glucose monitoring (ONE TOUCH DELICA) lancets     blood glucose monitoring (ONE TOUCH ULTRA MINI) meter device kit     levothyroxine (SYNTHROID/LEVOTHROID) 175 MCG tablet     metFORMIN (GLUCOPHAGE) 500 MG tablet     metoprolol succinate ER (TOPROL-XL) 100 MG 24 hr tablet     multivitamin w/minerals (MULTI-VITAMIN) tablet     naproxen (NAPROSYN)  500 MG tablet     nystatin (MYCOSTATIN) 560490 UNIT/GM external cream     omega 3 1000 MG CAPS     order for DME     order for DME     order for DME     polyethylene glycol (MIRALAX/GLYCOLAX) packet     potassium chloride ER (K-DUR/KLOR-CON M) 20 MEQ CR tablet     senna-docusate (SENOKOT-S/PERICOLACE) 8.6-50 MG tablet     spironolactone (ALDACTONE) 25 MG tablet     tamsulosin (FLOMAX) 0.4 MG capsule     torsemide (DEMADEX) 100 MG tablet     vitamin B complex with vitamin C (VITAMIN  B COMPLEX) tablet     vitamin E (VITAMIN E COMPLEX) 1000 units (450 mg) capsule     warfarin (COUMADIN) 5 MG tablet     No current facility-administered medications for this visit.          SOC      FHx  Family History   Problem Relation Age of Onset     Depression Mother      Glaucoma Maternal Grandfather      Cancer No family hx of         No family history of skin cancer     Macular Degeneration No family hx of        PHYSICAL EXAMINATION:  Vital signs: /75   Pulse 84   Temp 98.5  F (36.9  C) (Oral)   Wt (!) 174.5 kg (384 lb 12.8 oz)   SpO2 96%   BMI 54.05 kg/m      Wt Readings from Last 5 Encounters:   05/10/19 (!) 174.5 kg (384 lb 12.8 oz)   04/17/19 (!) 172.7 kg (380 lb 12.8 oz)   04/02/19 (!) 168.9 kg (372 lb 6.4 oz)   04/02/19 (!) 170 kg (374 lb 12.8 oz)   03/20/19 (!) 161.8 kg (356 lb 11.3 oz)         Gen: no distress  Skin: Forehead - enlarging lesion on forehead. 2 cm.  Lungs: clear  Cor: irreg rhythm, no S3 S4 murmur or rub  Ext: no edema with wraps off  R great toe - blackened area - prob early gangrene -   L 3rd toe - nail curled and causing pain        LABS  Results for orders placed or performed in visit on 05/10/19 (from the past 24 hour(s))   Basic Metabolic Panel ([facility])   Result Value Ref Range    Urea Nitrogen 18.7 7.0 - 21.0 mg/dL    Calcium 9.1 8.5 - 10.1 mg/dL    Chloride 101.2 98.0 - 110.0 mmol/L    Carbon Dioxide 26.1 20.0 - 32.0 mmol/L    Creatinine 1.1 0.7 - 1.3 mg/dL    Glucose 133.1 (H) 70.0 -  99.0 mg'dL    Potassium 4.2 3.3 - 4.5 mmol/dL    Sodium 137.0 132.6 - 141.4 mmol/L    GFR Estimate 70.8 >60.0 mL/min/1.7 m2    GFR Estimate If Black 85.6 >60.0 mL/min/1.7 m2   INR ([facility])   Result Value Ref Range    INR 2.5          ASSESSMENT/PLAN    1. Toe gangrene (H)  Right great toe worsening again after earlier debridement.  It appears that a repeat debridement is needed.  Will re--refer him to podiatry clinic and     - [facility] REFERRAL - INTERNAL    2. Essential hypertension with goal blood pressure less than 140/90  No further episodes of presyncope on this new leg pressure regiment.  He is on torsemide daily and Spironolactone.  Blood pressure is 130/70.      3. (HFpEF) heart failure with preserved ejection fraction (H)  Although the patient has gained 4 pounds on the new diuresis regiment no other signs of CHF.  Continue present regiment.    4. Hypokalemia  No further hypokalemia with Spironolactone on board and less torsemide. Continues on some KDur. Monitor.    5. Type 2 diabetes mellitus with hyperglycemia, without long-term current use of insulin (H)  DME order for new diabetes shoes.  Patient can take them to the orthotics department for new shoes to be ordered.    - order for DME; Equipment being ordered: Diabetes Shoes  Dispense: 1 Units; Refill: 0    6. Chronic atrial fibrillation (H)  INR 2.5.  Continue present regiment of Coumadin 10 mg daily.    7. Skin cancer  Enlarging lesion on forehead with previous biopsy (? Location).  Patient needs appointment with our  Dermatology department      JACK CEJA    40min spent with the patient, >50% in arranging care for multiple issues  Jack

## 2019-05-11 ASSESSMENT — ANXIETY QUESTIONNAIRES: GAD7 TOTAL SCORE: 13

## 2019-05-13 NOTE — PATIENT INSTRUCTIONS
Ortho Referral   Hi you guys!      Patient will need to contact the clinic directly as he is a return patient. But I will forsure place him on Dr. Lagos's waitlist.      Thanks!   Laurel Fernandez [facility]  Care Coordinator    Notified Patient 5/13/2019    Patient is scheduled w Dr Gómez 5/16 @ 10:45  Lindsay Fernandez [facility]  Care Coordinator

## 2019-05-14 ENCOUNTER — DOCUMENTATION ONLY (OUTPATIENT)
Dept: CARE COORDINATION | Facility: CLINIC | Age: 69
End: 2019-05-14

## 2019-05-15 ENCOUNTER — TELEPHONE (OUTPATIENT)
Dept: DERMATOLOGY | Facility: CLINIC | Age: 69
End: 2019-05-15

## 2019-05-16 ENCOUNTER — OFFICE VISIT (OUTPATIENT)
Dept: PODIATRY | Facility: CLINIC | Age: 69
End: 2019-05-16
Payer: COMMERCIAL

## 2019-05-16 ENCOUNTER — ANCILLARY PROCEDURE (OUTPATIENT)
Dept: GENERAL RADIOLOGY | Facility: CLINIC | Age: 69
End: 2019-05-16
Attending: PODIATRIST
Payer: COMMERCIAL

## 2019-05-16 VITALS
BODY MASS INDEX: 44.1 KG/M2 | DIASTOLIC BLOOD PRESSURE: 80 MMHG | WEIGHT: 315 LBS | HEIGHT: 71 IN | SYSTOLIC BLOOD PRESSURE: 136 MMHG

## 2019-05-16 DIAGNOSIS — E11.65 TYPE 2 DIABETES MELLITUS WITH HYPERGLYCEMIA, WITHOUT LONG-TERM CURRENT USE OF INSULIN (H): ICD-10-CM

## 2019-05-16 DIAGNOSIS — L97.512 SKIN ULCER OF SECOND TOE OF RIGHT FOOT WITH FAT LAYER EXPOSED (H): ICD-10-CM

## 2019-05-16 DIAGNOSIS — L97.512 SKIN ULCER OF SECOND TOE OF RIGHT FOOT WITH FAT LAYER EXPOSED (H): Primary | ICD-10-CM

## 2019-05-16 DIAGNOSIS — L60.2 NAIL HYPERTROPHY: ICD-10-CM

## 2019-05-16 DIAGNOSIS — L84 CALLUS OF FOOT: ICD-10-CM

## 2019-05-16 DIAGNOSIS — G63 POLYNEUROPATHY ASSOCIATED WITH UNDERLYING DISEASE (H): ICD-10-CM

## 2019-05-16 DIAGNOSIS — B35.1 ONYCHOMYCOSIS: ICD-10-CM

## 2019-05-16 PROCEDURE — 73660 X-RAY EXAM OF TOE(S): CPT | Mod: RT

## 2019-05-16 PROCEDURE — 11721 DEBRIDE NAIL 6 OR MORE: CPT | Mod: 59 | Performed by: PODIATRIST

## 2019-05-16 PROCEDURE — 11042 DBRDMT SUBQ TIS 1ST 20SQCM/<: CPT | Mod: 59 | Performed by: PODIATRIST

## 2019-05-16 PROCEDURE — 99214 OFFICE O/P EST MOD 30 MIN: CPT | Mod: 25 | Performed by: PODIATRIST

## 2019-05-16 PROCEDURE — 11055 PARING/CUTG B9 HYPRKER LES 1: CPT | Performed by: PODIATRIST

## 2019-05-16 ASSESSMENT — MIFFLIN-ST. JEOR: SCORE: 2533.6

## 2019-05-16 NOTE — PROGRESS NOTES
"ASSESSMENT/PLAN:    Encounter Diagnoses   Name Primary?     Skin ulcer of second toe of right foot with fat layer exposed (H) Yes     Type 2 diabetes mellitus with hyperglycemia, without long-term current use of insulin (H)      Polyneuropathy associated with underlying disease (H)      Nail hypertrophy      Onychomycosis      Callus of foot      I do not appreciate any changes in soft tissues to suggest dry or wet gangrene.     XR pending    Wound Care Recommendations:    1)  Keep the wound covered by a bandage when bathing.    2)  Gently clean the wound with soap water, separate from bath/shower water.      3)  Each day, apply a topical antibiotic ointment to the wound (Neosporin, Triple antibiotic, Bacitracin).   Cover with large band-aid or gauze.      5)  Please seek immediate medical attention if any increasing redness, drainage, smell, or pain related to the wound.     6)  Please return to clinic in the period of time requested by Dr. Gómez.      Using a # 15 scalpel, I paired down the callus at the tip of the left third toe. No ulceration.     Using a sterile nail nippers, debridement of 10 nails was performed.  Nails were shortened.  The thickest nails were cut in a fasion to skive off some of the bulk.  Subungual debris was cleared away where needed.      He is to continue with his offloading shoes.    Follow up in 2 weeks    Excisional Debridement    The excisional debrident procedure discussed.  This included the goals of removing non-viable tissue, evaluating the full extent of wound, and promoting wound healing.  Clarke Moreira  provided verbal and written consent.  The \"Time Out\" was called.     Using a sterile #15 blade and tissue nippers, excisional debridment of the right hallux ulcer was performed, full-thickness to the level of, and including, the fat layer.  The hyperkeratotic eschar surrounding the wound was removed, by excising skin edges back to healthy, bleeding tissue .  Other non-viable " "tissue was excised. The ulcer base was scraped to remove bioburden and promote healing.  The area debrided was less than 20 square cm.   There was moderate bleeding with the procedure. No anesthesia was needed due to peripheral neuropathy.   A sterile dressing was applied.      Body mass index is 54.05 kg/m .    Weight management plan: Patient was referred to their PCP to discuss a diet and exercise plan.      Clarke Gómez, KIERRA, FACFAS, MS    [place] [facility] of Pod[facility]ry/Foot & Ankle Surgery      ____________________________________________________________________    HPI:       I was asked by Dr. Matthias Sanchez to evaluate Clarke Moreira, in consultation, for toe gangrene.   Chief Complaint: possible gangrene in toes   Onset of problem: 6 months  Pain/ discomfort is described as:  Throbbing, shooting  Pain Ratin/10 at worst.  Frequency:  daily    The pain is exacerbated by walking, standing.  Previous treatment: When he was hospitalized for sepsis I March, a podiatrist \"shaved\" the right great toe.   Type 2 DM. Reported numbness in his feet.    Patient Active Problem List   Diagnosis     Atrial fibrillation (H)     Hypothyroidism     Basal cell carcinoma of skin of face     CTS (carpal tunnel syndrome)     Myopia     Plantar fasciitis     Presbyopia     Regular astigmatism     Neoplasm of uncertain behavior of skin     Venous stasis     Type 2 diabetes mellitus with hyperglycemia, without long-term current use of insulin (H)     Morbid obesity (H)     Depression with anxiety     Hyperlipidemia LDL goal <100     Fall     BiPAP (biphasic positive airway pressure) dependence     Lymphedema     Chronic systolic congestive heart failure (H)     Essential hypertension with goal blood pressure less than 140/90     Urinary retention     Constipation, unspecified constipation type     Cellulitis     Onychomycosis     (HFpEF) heart failure with preserved ejection fraction (H)     Chronic hypercapnic respiratory " failure (H)     Hypoventilation associated with obesity syndrome (H)     Pre-syncope     Elevated serum creatinine     PAD (peripheral artery disease) (H)     THONG (obstructive sleep apnea)     Hypotension     Adequate nutrition     Past Surgical History:   Procedure Laterality Date     BIOPSY OF SKIN LESION       MOHS MICROGRAPHIC PROCEDURE       PICC INSERTION Right 09/24/2017    5fr TL Bard PICC, 51cm (1cm external) in the R medial brachial vein w/ tip in the SVC.     Current Outpatient Medications   Medication Sig Dispense Refill     ammonium lactate (LAC-HYDRIN) 12 % external cream Apply topically 2 times daily as needed for dry skin 385 g 0     Ascorbic Acid (VITAMIN C) POWD Take 500 mcg by mouth daily  0     aspirin (ASA) 81 MG tablet Take 1 tablet (81 mg) by mouth daily 30 tablet 0     atorvastatin (LIPITOR) 40 MG tablet Take 1 tablet (40 mg) by mouth daily 30 tablet 0     blood glucose (ONETOUCH ULTRA) test strip Use to test blood sugars 2 times daily or as directed. 100 strip 11     blood glucose monitoring (ONE TOUCH DELICA) lancets Use to test blood sugars 2 times daily or as directed. 100 each 11     blood glucose monitoring (ONE TOUCH ULTRA MINI) meter device kit Use to test blood sugars 2 times daily or as directed. 1 kit 0     levothyroxine (SYNTHROID/LEVOTHROID) 175 MCG tablet Take 1 tablet (175 mcg) by mouth every morning (before breakfast) 90 tablet 3     metFORMIN (GLUCOPHAGE) 500 MG tablet Take 1 tablet (500 mg) by mouth 2 times daily (with meals) 60 tablet 11     metoprolol succinate ER (TOPROL-XL) 100 MG 24 hr tablet Take 1 tablet (100 mg) by mouth daily 30 tablet 0     multivitamin w/minerals (MULTI-VITAMIN) tablet Take 1 tablet by mouth daily 30 each 0     naproxen (NAPROSYN) 500 MG tablet Take 1 tablet (500 mg) by mouth 2 times daily (with meals) 10 tablet 1     nystatin (MYCOSTATIN) 878559 UNIT/GM external cream Apply topically 2 times daily as needed for dry skin 30 g 11     omega 3 1000  MG CAPS Take 1 g by mouth daily 30 capsule 0     order for DME Equipment being ordered: Diabetes Shoes 1 Units 0     order for DME Equipment being ordered: Custom diabetic shoes 1 Units 0     order for DME Equipment being ordered: Bariatric Lift chair  Diagnosis - morbid obesity, CHF, Lymphedema    Fax to Ne from [employer] at [phone]. 1 Units 0     order for DME Equipment being ordered: Other: Velcro Compression stockings.   Treatment Diagnosis: bilateral lymphedema. 2 each 0     polyethylene glycol (MIRALAX/GLYCOLAX) packet Take 17 g by mouth daily as needed for constipation 15 packet 0     potassium chloride ER (K-DUR/KLOR-CON M) 20 MEQ CR tablet Take 2 tablets (40 mEq) by mouth 3 times daily X 1 week , then 2 tablets 2 times daily ongoing. 90 tablet 11     senna-docusate (SENOKOT-S/PERICOLACE) 8.6-50 MG tablet Take 1-2 tablets by mouth 2 times daily as needed for constipation 60 tablet 0     spironolactone (ALDACTONE) 25 MG tablet Take 1 tablet (25 mg) by mouth daily 30 tablet 6     tamsulosin (FLOMAX) 0.4 MG capsule Take 1 capsule (0.4 mg) by mouth daily 30 capsule 0     torsemide (DEMADEX) 100 MG tablet Take 1 tablet (100 mg) by mouth daily Take one tab (100mg) by mouth every morning, take one half tab (50mg) by mouth every evening 30 tablet 11     vitamin B complex with vitamin C (VITAMIN  B COMPLEX) tablet Take 1 tablet by mouth daily 100 tablet 3     vitamin E (VITAMIN E COMPLEX) 1000 units (450 mg) capsule Take 1 capsule (1,000 Units) by mouth daily 100 capsule 3     warfarin (COUMADIN) 5 MG tablet Take 12.5 mg by mouth  5x/week, Take 10 mg by mouth 2x per week 75 tablet 11       ROS:    A 10-point review of systems was performed.  It is positive for that noted in the HPI and as seen below.  All other systems found to be negative.     Numbness in feet?  yes   Calf pain with walking? no  Recent foot/ankle injury? yes  Weight change  over past 12 months? no  Self perception as overweight? yes  Recent  flu-like symptoms? no  Joint pain other than feet ? Knees, hips, left shoulder    EXAM:    Vitals: There were no vitals taken for this visit.  BMI: There is no height or weight on file to calculate BMI.  Height: Data Unavailable    Constitutional/ general:  Pt is in no apparent distress, appears well-nourished.  Cooperative with history and physical exam.     Diabetic Foot Exam (details below):     Vascular:  Pedal pulses are palpable bilaterally for both the DP and PT arteries.  CFT < 3 sec.  No edema.  Pedal hair growth noted.     Neuro:  Alert and oriented x 3. Coordinated gait.  Light touch sensation is diminished.to the L4, L5, S1 distributions. No obvious deficits.  No evidence of neurological-based weakness, spasticity, or contracture in the lower extremities.     Derm:   Right foot:   Thick hyperkeratotic tissue distal right hallux with hyperpigmentation suggestive intra-epidermal bleeding. Deep to this is a 1.5cm ulceration to the fat layer. It did not probe to bone.     Left Foot:   Hyperkeratotic lesion distal left 3rd toe with pigmentation suggesting intra-epdiermal bleeding. No ulceration.     Nails are elongated, deformed, discolored.    Musculoskeletal:    Lower extremity muscle strength is normal.  Patient is ambulatory without an assistive device or brace .  Flat feet. Hallux limitus on the right .    Radiographic Exam:  X-Ray Findings:  pending      Clarke Gómez DPM, ANITA, MS [place] Department of Podiatry/Foot & Ankle Surgery

## 2019-05-16 NOTE — LETTER
5/16/2019         RE: Clarke Moreira  [address]  [facility] [address]        Dear Colleague,    Thank you for referring your patient, Clarke Moreira, to the [facility]. Please see a copy of my visit note below.    ASSESSMENT/PLAN:    Encounter Diagnoses   Name Primary?     Skin ulcer of second toe of right foot with fat layer exposed (H) Yes     Type 2 diabetes mellitus with hyperglycemia, without long-term current use of insulin (H)      Polyneuropathy associated with underlying disease (H)      Nail hypertrophy      Onychomycosis      Callus of foot      I do not appreciate any changes in soft tissues to suggest dry or wet gangrene.     XR pending    Wound Care Recommendations:    1)  Keep the wound covered by a bandage when bathing.    2)  Gently clean the wound with soap water, separate from bath/shower water.      3)  Each day, apply a topical antibiotic ointment to the wound (Neosporin, Triple antibiotic, Bacitracin).   Cover with large band-aid or gauze.      5)  Please seek immediate medical attention if any increasing redness, drainage, smell, or pain related to the wound.     6)  Please return to clinic in the period of time requested by Dr. Gómez.      Using a # 15 scalpel, I paired down the callus at the tip of the left third toe. No ulceration.     Using a sterile nail nippers, debridement of 10 nails was performed.  Nails were shortened.  The thickest nails were cut in a fasion to skive off some of the bulk.  Subungual debris was cleared away where needed.      He is to continue with his offloading shoes.    Follow up in 2 weeks    Body mass index is 54.05 kg/m .    Weight management plan: Patient was referred to their PCP to discuss a diet and exercise plan.      Clarke Gómez, KIERRA, FACFAS, MS    [place] [facility] of Podiatry/Foot & Ankle Surgery      ____________________________________________________________________    HPI:       I was asked by Dr. Rizzo  "Laura to evaluate Clarke Moreira, in consultation, for toe gangrene.   Chief Complaint: possible gangrene in toes   Onset of problem: 6 months  Pain/ discomfort is described as:  Throbbing, shooting  Pain Ratin/10 at worst.  Frequency:  daily    The pain is exacerbated by walking, standing.  Previous treatment: When he was hospitalized for sepsis , a podiatrist \"shaved\" the right great toe.   Type 2 DM. Reported numbness in his feet.    Patient Active Problem List   Diagnosis     Atrial fibrillation (H)     Hypothyroidism     Basal cell carcinoma of skin of face     CTS (carpal tunnel syndrome)     Myopia     Plantar fasciitis     Presbyopia     Regular astigmatism     Neoplasm of uncertain behavior of skin     Venous stasis     Type 2 diabetes mellitus with hyperglycemia, without long-term current use of insulin (H)     Morbid obesity (H)     Depression with anxiety     Hyperlipidemia LDL goal <100     Fall     BiPAP (biphasic positive airway pressure) dependence     Lymphedema     Chronic systolic congestive heart failure (H)     Essential hypertension with goal blood pressure less than 140/90     Urinary retention     Constipation, unspecified constipation type     Cellulitis     Onychomycosis     (HFpEF) heart failure with preserved ejection fraction (H)     Chronic hypercapnic respiratory failure (H)     Hypoventilation associated with obesity syndrome (H)     Pre-syncope     Elevated serum creatinine     PAD (peripheral artery disease) (H)     THONG (obstructive sleep apnea)     Hypotension     Adequate nutrition     Past Surgical History:   Procedure Laterality Date     BIOPSY OF SKIN LESION       MOHS MICROGRAPHIC PROCEDURE       PICC INSERTION Right 2017    5fr TL Bard PICC, 51cm (1cm external) in the R medial brachial vein w/ tip in the SVC.     Current Outpatient Medications   Medication Sig Dispense Refill     ammonium lactate (LAC-HYDRIN) 12 % external cream Apply topically 2 times " daily as needed for dry skin 385 g 0     Ascorbic Acid (VITAMIN C) POWD Take 500 mcg by mouth daily  0     aspirin (ASA) 81 MG tablet Take 1 tablet (81 mg) by mouth daily 30 tablet 0     atorvastatin (LIPITOR) 40 MG tablet Take 1 tablet (40 mg) by mouth daily 30 tablet 0     blood glucose (ONETOUCH ULTRA) test strip Use to test blood sugars 2 times daily or as directed. 100 strip 11     blood glucose monitoring (ONE TOUCH DELICA) lancets Use to test blood sugars 2 times daily or as directed. 100 each 11     blood glucose monitoring (ONE TOUCH ULTRA MINI) meter device kit Use to test blood sugars 2 times daily or as directed. 1 kit 0     levothyroxine (SYNTHROID/LEVOTHROID) 175 MCG tablet Take 1 tablet (175 mcg) by mouth every morning (before breakfast) 90 tablet 3     metFORMIN (GLUCOPHAGE) 500 MG tablet Take 1 tablet (500 mg) by mouth 2 times daily (with meals) 60 tablet 11     metoprolol succinate ER (TOPROL-XL) 100 MG 24 hr tablet Take 1 tablet (100 mg) by mouth daily 30 tablet 0     multivitamin w/minerals (MULTI-VITAMIN) tablet Take 1 tablet by mouth daily 30 each 0     naproxen (NAPROSYN) 500 MG tablet Take 1 tablet (500 mg) by mouth 2 times daily (with meals) 10 tablet 1     nystatin (MYCOSTATIN) 360177 UNIT/GM external cream Apply topically 2 times daily as needed for dry skin 30 g 11     omega 3 1000 MG CAPS Take 1 g by mouth daily 30 capsule 0     order for DME Equipment being ordered: Diabetes Shoes 1 Units 0     order for DME Equipment being ordered: Custom diabetic shoes 1 Units 0     order for DME Equipment being ordered: Bariatric Lift chair  Diagnosis - morbid obesity, CHF, Lymphedema    Fax to Ne from [employer] at [phone]. 1 Units 0     order for DME Equipment being ordered: Other: Velcro Compression stockings.   Treatment Diagnosis: bilateral lymphedema. 2 each 0     polyethylene glycol (MIRALAX/GLYCOLAX) packet Take 17 g by mouth daily as needed for constipation 15 packet 0     potassium  chloride ER (K-DUR/KLOR-CON M) 20 MEQ CR tablet Take 2 tablets (40 mEq) by mouth 3 times daily X 1 week , then 2 tablets 2 times daily ongoing. 90 tablet 11     senna-docusate (SENOKOT-S/PERICOLACE) 8.6-50 MG tablet Take 1-2 tablets by mouth 2 times daily as needed for constipation 60 tablet 0     spironolactone (ALDACTONE) 25 MG tablet Take 1 tablet (25 mg) by mouth daily 30 tablet 6     tamsulosin (FLOMAX) 0.4 MG capsule Take 1 capsule (0.4 mg) by mouth daily 30 capsule 0     torsemide (DEMADEX) 100 MG tablet Take 1 tablet (100 mg) by mouth daily Take one tab (100mg) by mouth every morning, take one half tab (50mg) by mouth every evening 30 tablet 11     vitamin B complex with vitamin C (VITAMIN  B COMPLEX) tablet Take 1 tablet by mouth daily 100 tablet 3     vitamin E (VITAMIN E COMPLEX) 1000 units (450 mg) capsule Take 1 capsule (1,000 Units) by mouth daily 100 capsule 3     warfarin (COUMADIN) 5 MG tablet Take 12.5 mg by mouth  5x/week, Take 10 mg by mouth 2x per week 75 tablet 11       ROS:    A 10-point review of systems was performed.  It is positive for that noted in the HPI and as seen below.  All other systems found to be negative.     Numbness in feet?  yes   Calf pain with walking? no  Recent foot/ankle injury? yes  Weight change  over past 12 months? no  Self perception as overweight? yes  Recent flu-like symptoms? no  Joint pain other than feet ? Knees, hips, left shoulder    EXAM:    Vitals: There were no vitals taken for this visit.  BMI: There is no height or weight on file to calculate BMI.  Height: Data Unavailable    Constitutional/ general:  Pt is in no apparent distress, appears well-nourished.  Cooperative with history and physical exam.     Diabetic Foot Exam (details below):     Vascular:  Pedal pulses are palpable bilaterally for both the DP and PT arteries.  CFT < 3 sec.  No edema.  Pedal hair growth noted.     Neuro:  Alert and oriented x 3. Coordinated gait.  Light touch sensation is  diminished.to the L4, L5, S1 distributions. No obvious deficits.  No evidence of neurological-based weakness, spasticity, or contracture in the lower extremities.     Derm:   Right foot:   Thick hyperkeratotic tissue distal right hallux with hyperpigmentation suggestive intra-epidermal bleeding. Deep to this is a 1.5cm ulceration to the fat layer. It did not probe to bone.     Left Foot:   Hyperkeratotic lesion distal left 3rd toe with pigmentation suggesting intra-epdiermal bleeding. No ulceration.     Nails are elongated, deformed, discolored.    Musculoskeletal:    Lower extremity muscle strength is normal.  Patient is ambulatory without an assistive device or brace .  Flat feet. Hallux limitus on the right .    Radiographic Exam:  X-Ray Findings:  pending      Clarke Gómez DPM, [place], MS    [place] [facility] of Podiatry/Foot & Ankle Surgery              Again, thank you for allowing me to participate in the care of your patient.        Sincerely,        Clarke Gómez DPM

## 2019-05-16 NOTE — PATIENT INSTRUCTIONS
"Thank you for choosing [facility]!    DR. DAVIS'S CLINIC LOCATIONS     MONDAY - [place] WEDNESDAY - [place]   [address] [address] [place] [address]   [phone] / [phone] / [phone]       THURSDAY - [place] SCHEDULE SURGERY: [phone]   [address] APPOINTMENTS: [phone]   [place] [address] BILLING QUESTIONS: [phone] / [phone]       Wound Care Recommendations:    1)  Keep the wound covered by a bandage when bathing.    2)  Gently clean the wound with soap water, separate from bath/shower water.      3)  Each day, apply a topical antibiotic ointment to the wound (Neosporin, Triple antibiotic, Bacitracin).   Cover with large band-aid or gauze.      5)  Please seek immediate medical attention if any increasing redness, drainage, smell, or pain related to the wound.     6)  Please return to clinic in the period of time requested by Dr. Davis.            SIGNS OF INFECTION  expanding redness around the wound   yellow or greenish-colored pus or cloudy wound drainage   red streaking spreading from the wound   increased swelling, tenderness, or pain around the wound   fever  *If you notice any of these signs of infection, call us right away!    DIABETES AND YOUR FEET  Diabetes can result in several problems in the feet including ulcers (open sores) and amputations. Two of the most important reasons why people develop foot problems when they have diabetes is : 1. Neuropathy (loss of feeling)  2. Vascular disease (loss or decrease of blood flow).    Neuropathy is a term used to describe a loss of nerve function.  Patients with diabetes are at risk of developing neuropathy if their sugars continue to run high and are above the normal value. One theory for neuropathy is that the \"extra\" sugar in the body enters the nerves and is broken down. These by-products build up in the " nerve causing it to swell and impairing nerve function. Often times, this can be prevented by controlling your sugars, dieting and exercise.    When a person develops neuropathy, they usually begin to feel numbness or tingling in their feet and sometime in their legs.  Other symptoms may include painful burning or hot feet, tingling or feeling like insects or ants are crawling on your feet or legs.  If the diabetes is sever and the sugars run high for long periods of time, neuropathy can also occur in the hands.    Vascular disease  is a term used to describe a loss or decrease in circulation (blood flow). There is a problem in getting blood and oxygen to areas that need it. Similar to neuropathy, sugars can build up in the walls of the arteries (blood vessels) and cause them to become swollen, thickened and hardened. This decreases the amount of blood that can go to an area that needs it. Though this is common in the legs of diabetic patients, it can also affect other arteries (blood vessels) in the body such as in the heart and eyes.    In the legs, vascular disease usually results in cramping. Patients who develop leg cramps after walking the same distance every time (i.e. One block, half a mile, ect.) need to let their doctors know so that their circulation may be checked. Cramps causing severe pain in the feet and/or legs while sleeping and the cramps go away when you stand or hang your legs off the side of the bed, may also be a sign of poor blood circulation.  Occasional cramping in cold weather or on rare occasions with activity may not be due to poor circulation, but you should inform your doctor.    PREVENTION OF THESE DISEASES  The key to prevention is good blood sugar control. Poor blood sugar control is a big reason many of these problems start. Physical activity (exercise) is a very good way to help decrease your blood sugars. Exercise can lower your blood sugar, blood pressure, and cholesterol. It  also reduces your risk for heart disease and stroke, relieves stress, and strengthens your heart, muscles and bones.  In addition, regular activity helps insulin work better, improves your blood circulation, and keeps your joints flexible. If you're trying to lose weight, a combination of exercise and wise food choices can help you reach your target weight and maintain it.      PAIN MANAGEMENT  1.Blood Sugar Control - Most important  2. Medications such as:  Amytriptylline, duloxetine, gabapentin, lyrica, tramadol  3. Nutritional therapy:  Vitamin B6 (100mg daily), Vitamin B12 (75mcg daily), Vitamin D 2000 IU daily), Alpha-Lipoic Acid (600-1800mg daily), Acetyl-L-Carnitine (500-1000mg TID, L-methyl folate (1500mcg daily)    ** Metformin can block Vitamin B6 and B12 so it is important to supplement**    FOOT CARE RECOMMENDATIONS   1. Wash your feet with lukewarm water and a mild soap and then dry them thoroughly, especially between the toes.     2. Examine your feet daily looking for cuts, corns, blisters, cracks, ect, especially after wearing new shoes. Make sure to look between your toes. If you cannot see the bottom of your feet, set a mirror on the floor and hold your foot over it, or ask a spouse, friend or family member to examine your feet for you. Contact your doctor immediately if new problems are noted or if sores are not healing.     3. Immediately apply moisturizer to the tops and bottoms of your feet, avoiding areas between the toes. Hand lotion (Intesive Care, Leah, Eucerin, Neutrogena, Curel, ect) is sufficient unless your doctor prescribes a medicated lotion. Apply sunscreen to your feet when going swimming outside.     4. Use clean comfortable shoes, wear white socks (if you have any bleeding or drainage, you will see it on white socks). Socks should not have thick seams or cut off the circulation around the leg. Break in new shoes slowly and rotate with older shoes until broken in. Check the inside  of your shoes with your hand to look for areas of irritation or objects that may have fallen into your shoes.       5. Keep slippers by the side of your bed for use during the night.     6.  Shoes should be fitted by a professional and should not cause areas of irritation.  Check your feet regularly when wearing a new pair of shoes and replace them as needed.     7.  Talk to your doctor about proper exercise. Exercise and stretching stimulate blood flow to your feet and maintain proper glucose levels.     8.  Monitor your blood glucose level as instructed by your doctor. Notify your doctor immediately if your blood sugar is abnormally high or low.    9. Cut your nails straight across, but then gently round any sharp edges with a cardboard nail file. If you have neuropathy, peripheral vascular disease or cannot see that well to trim your own toenails contact [facility] ([phone]) or [facility] ([phone]).      THINGS TO AVOID DOING   1.  Do not soak your feet if you have an open sore. Use only lukewarm water and always check the temperature with your hand as hot water can easily burn your feet.       2.  Never use a hot water bottle or heating pad on your feet. Also do not apply cold compresses to your feet. With decreased sensation, you could burn or freeze your feet.       3.  Do not apply any of these to your feet:    -  Over the counter medicine for corns or warts    -  Harsh chemicals like boric acid    -  Do not self-treat corns, cuts, blisters or infections. Always consult your doctor.       4.  Do not wear sandals, slippers or walk barefoot, especially on hot sand or concrete or other harsh surfaces.     5.  If you smoke, stop!!!            BODY WEIGHT AND YOUR FEET  The following information is included in the after visit summary for all patients. Body weight can be a sensitive issue to discuss in clinic, but we think the following information is very important. Although we focus on the feet and  ankles, we do support the overall health of our patients.     Many things can cause foot and ankle problems. Foot structure, activity level, foot mechanics and injuries are common causes of pain. One very important issue that often goes unmentioned, is body weight. Extra weight can cause increased stress on muscles, ligaments, bones and tendons. Sometimes just a few extra pounds is all it takes to put one over her/his threshold. Without reducing that stress, it can be difficult to alleviate pain. As Foot & Ankle specialists, our job is addressing the lower extremity problem and possible causes. Regarding extra body weight, we encourage patients to discuss diet and weight management plans with their primary care doctors. It is this team approach that gives you the best opportunity for pain relief and getting you back on your feet.      [facility] has a Comprehensive Weight Management Program. This program includes counseling, education, non-surgical and surgical approaches to weight loss. If you are interested in learning more either talk to you primary care provider or call [phone].

## 2019-05-20 ENCOUNTER — TELEPHONE (OUTPATIENT)
Dept: PODIATRY | Facility: CLINIC | Age: 69
End: 2019-05-20

## 2019-05-20 ENCOUNTER — OFFICE VISIT (OUTPATIENT)
Dept: CARDIOLOGY | Facility: CLINIC | Age: 69
End: 2019-05-20
Attending: NURSE PRACTITIONER
Payer: COMMERCIAL

## 2019-05-20 VITALS
BODY MASS INDEX: 42.66 KG/M2 | HEART RATE: 88 BPM | WEIGHT: 315 LBS | DIASTOLIC BLOOD PRESSURE: 83 MMHG | SYSTOLIC BLOOD PRESSURE: 126 MMHG | OXYGEN SATURATION: 97 % | HEIGHT: 72 IN

## 2019-05-20 DIAGNOSIS — E11.9 TYPE 2 DIABETES MELLITUS WITHOUT COMPLICATION, WITHOUT LONG-TERM CURRENT USE OF INSULIN (H): ICD-10-CM

## 2019-05-20 DIAGNOSIS — I50.30 (HFPEF) HEART FAILURE WITH PRESERVED EJECTION FRACTION (H): Primary | ICD-10-CM

## 2019-05-20 DIAGNOSIS — I48.91 ATRIAL FIBRILLATION, UNSPECIFIED TYPE (H): ICD-10-CM

## 2019-05-20 DIAGNOSIS — E11.621 DIABETIC ULCER OF RIGHT FOOT DUE TO TYPE 2 DIABETES MELLITUS (H): Primary | ICD-10-CM

## 2019-05-20 DIAGNOSIS — I50.22 CHRONIC SYSTOLIC CONGESTIVE HEART FAILURE (H): ICD-10-CM

## 2019-05-20 DIAGNOSIS — G47.33 OSA (OBSTRUCTIVE SLEEP APNEA): ICD-10-CM

## 2019-05-20 DIAGNOSIS — E11.65 TYPE 2 DIABETES MELLITUS WITH HYPERGLYCEMIA, WITHOUT LONG-TERM CURRENT USE OF INSULIN (H): ICD-10-CM

## 2019-05-20 DIAGNOSIS — R06.02 SOB (SHORTNESS OF BREATH): ICD-10-CM

## 2019-05-20 DIAGNOSIS — L97.519 DIABETIC ULCER OF RIGHT FOOT DUE TO TYPE 2 DIABETES MELLITUS (H): Primary | ICD-10-CM

## 2019-05-20 LAB
ANION GAP SERPL CALCULATED.3IONS-SCNC: 7 MMOL/L (ref 3–14)
BUN SERPL-MCNC: 13 MG/DL (ref 7–30)
CALCIUM SERPL-MCNC: 10 MG/DL (ref 8.5–10.1)
CHLORIDE SERPL-SCNC: 101 MMOL/L (ref 94–109)
CO2 SERPL-SCNC: 30 MMOL/L (ref 20–32)
CREAT SERPL-MCNC: 1.18 MG/DL (ref 0.66–1.25)
GFR SERPL CREATININE-BSD FRML MDRD: 63 ML/MIN/{1.73_M2}
GLUCOSE SERPL-MCNC: 125 MG/DL (ref 70–99)
POTASSIUM SERPL-SCNC: 4 MMOL/L (ref 3.4–5.3)
SODIUM SERPL-SCNC: 137 MMOL/L (ref 133–144)

## 2019-05-20 PROCEDURE — 99214 OFFICE O/P EST MOD 30 MIN: CPT | Mod: ZP | Performed by: NURSE PRACTITIONER

## 2019-05-20 PROCEDURE — G0463 HOSPITAL OUTPT CLINIC VISIT: HCPCS | Mod: ZF

## 2019-05-20 PROCEDURE — 36415 COLL VENOUS BLD VENIPUNCTURE: CPT | Performed by: NURSE PRACTITIONER

## 2019-05-20 PROCEDURE — 80048 BASIC METABOLIC PNL TOTAL CA: CPT | Performed by: NURSE PRACTITIONER

## 2019-05-20 ASSESSMENT — PAIN SCALES - GENERAL: PAINLEVEL: NO PAIN (0)

## 2019-05-20 ASSESSMENT — MIFFLIN-ST. JEOR: SCORE: 2547.09

## 2019-05-20 NOTE — TELEPHONE ENCOUNTER

## 2019-05-20 NOTE — PROGRESS NOTES
"  HPI:   Mr. Moreira is a 68 year old male with a past medical history of systolic heart failure, most recent LVEF normal, also hx of morbid obesity, atrial fibrillation s/p ablation 2008, THONG/alveolar hypoventilation, HTN, dyslipidemia, PAD, hypothyroidism, type2 DM, depression, anxiety, BPH and a recent hospitalization for Sepsis (3/7/19-4/3/19) with sinus infection.  Presents to clinic for CORE follow-up.     Recent dental extraction (2/26/19) who was admitted to [facility] 3/6 for sepsis and MICHELLE. Subsequently diagnosed with maxillary sinusitis (completed abx 3/12). MICHELLE resolved with IVF. Hospital stay c/b a-fib with RVR. Transferred to TCU for rehab.     Since then, he reports feeling much better. He says all his symptoms which he had since June of 2018 have resolved.  He was having issues with feeling faint, having visual disturbance. He is very happy with his current medication regimen which his PCP has helped with. He is not on any Lisinopril. He is taking 100 mg of Torsemide daily and 100 mg of Toprol. Apparently he had been discharged home on 100 mg Torsemide in am and 50 mg in the pm. He says his PCP discontinued the pm dose as the \"there was no benefit\".   Denies orthopnea, PND, cough, he wears CPAP. Functionally very limited due to body size but denies chest pain or shortness of breath with current level of exertion. He denies palpitations, presyncope, syncope, orthostasis. Weight at home today 379 lb. Patient reports having trouble sleeping due to anxiety and being alone.  He says his appetite is \"ok\" but has been eating more carb's than in the hospital.  He has tried Meals on Wheels and says the meals were not palatable.      PAST MEDICAL HISTORY:  Past Medical History:   Diagnosis Date     Atrial fibrillation (H)      Basal cell carcinoma      Chronic anticoagulation      Diastolic heart failure (H)      Dyslipidemia      Essential hypertension      Morbid obesity (H)      Sleep-disordered breathing      " Type 2 diabetes mellitus (H)        FAMILY HISTORY:  Family History   Problem Relation Age of Onset     Depression Mother      Glaucoma Maternal Grandfather      Cancer No family hx of         No family history of skin cancer     Macular Degeneration No family hx of        SOCIAL HISTORY:  Social History     Socioeconomic History     Marital status: Single     Spouse name: Not on file     Number of children: Not on file     Years of education: Not on file     Highest education level: Not on file   Occupational History     Not on file   Social Needs     Financial resource strain: Not on file     Food insecurity:     Worry: Not on file     Inability: Not on file     Transportation needs:     Medical: Not on file     Non-medical: Not on file   Tobacco Use     Smoking status: Never Smoker     Smokeless tobacco: Never Used   Substance and Sexual Activity     Alcohol use: No     Comment: Former alcohol abuse. Quit 70s then quit later in 80s-present     Drug use: No     Comment: history of LSD use     Sexual activity: Not on file   Lifestyle     Physical activity:     Days per week: Not on file     Minutes per session: Not on file     Stress: Not on file   Relationships     Social connections:     Talks on phone: Not on file     Gets together: Not on file     Attends Jew service: Not on file     Active member of club or organization: Not on file     Attends meetings of clubs or organizations: Not on file     Relationship status: Not on file     Intimate partner violence:     Fear of current or ex partner: Not on file     Emotionally abused: Not on file     Physically abused: Not on file     Forced sexual activity: Not on file   Other Topics Concern     Parent/sibling w/ CABG, MI or angioplasty before 65F 55M? Not Asked   Social History Narrative    Lives in an apartment in 16th floor near hospital.  Has elevators, unable to use stairs. Not able to shop; has things delivered to him including food. Difficulty  completing cleaning. Goes to PCP once yearly for annual check up and medication review.       CURRENT MEDICATIONS:    Current Outpatient Medications on File Prior to Visit:  ammonium lactate (LAC-HYDRIN) 12 % external cream Apply topically 2 times daily as needed for dry skin   Ascorbic Acid (VITAMIN C) POWD Take 500 mcg by mouth daily   aspirin (ASA) 81 MG tablet Take 1 tablet (81 mg) by mouth daily   atorvastatin (LIPITOR) 40 MG tablet Take 1 tablet (40 mg) by mouth daily   blood glucose (ONETOUCH ULTRA) test strip Use to test blood sugars 2 times daily or as directed.   blood glucose monitoring (ONE TOUCH DELICA) lancets Use to test blood sugars 2 times daily or as directed.   blood glucose monitoring (ONE TOUCH ULTRA MINI) meter device kit Use to test blood sugars 2 times daily or as directed.   levothyroxine (SYNTHROID/LEVOTHROID) 175 MCG tablet Take 1 tablet (175 mcg) by mouth every morning (before breakfast)   metFORMIN (GLUCOPHAGE) 500 MG tablet Take 1 tablet (500 mg) by mouth 2 times daily (with meals)   metoprolol succinate ER (TOPROL-XL) 100 MG 24 hr tablet Take 1 tablet (100 mg) by mouth daily   multivitamin w/minerals (MULTI-VITAMIN) tablet Take 1 tablet by mouth daily   nystatin (MYCOSTATIN) 610814 UNIT/GM external cream Apply topically 2 times daily as needed for dry skin   omega 3 1000 MG CAPS Take 1 g by mouth daily   order for DME Equipment being ordered: Diabetes Shoes   order for DME Equipment being ordered: Custom diabetic shoes   order for DME Equipment being ordered: Bariatric Lift chairDiagnosis - morbid obesity, CHF, LymphedemaFax to Ne from [employer] at [phone].   order for DME Equipment being ordered: Other: Velcro Compression stockings. Treatment Diagnosis: bilateral lymphedema.   polyethylene glycol (MIRALAX/GLYCOLAX) packet Take 17 g by mouth daily as needed for constipation   potassium chloride ER (K-DUR/KLOR-CON M) 20 MEQ CR tablet Take 2 tablets (40 mEq) by mouth 3 times  daily X 1 week , then 2 tablets 2 times daily ongoing.   senna-docusate (SENOKOT-S/PERICOLACE) 8.6-50 MG tablet Take 1-2 tablets by mouth 2 times daily as needed for constipation   spironolactone (ALDACTONE) 25 MG tablet Take 1 tablet (25 mg) by mouth daily   tamsulosin (FLOMAX) 0.4 MG capsule Take 1 capsule (0.4 mg) by mouth daily   torsemide (DEMADEX) 100 MG tablet Take 1 tablet (100 mg) by mouth daily Take one tab (100mg) by mouth every morning, take one half tab (50mg) by mouth every evening   vitamin B complex with vitamin C (VITAMIN  B COMPLEX) tablet Take 1 tablet by mouth daily   vitamin E (VITAMIN E COMPLEX) 1000 units (450 mg) capsule Take 1 capsule (1,000 Units) by mouth daily   warfarin (COUMADIN) 5 MG tablet Take 12.5 mg by mouth  5x/week, Take 10 mg by mouth 2x per week   naproxen (NAPROSYN) 500 MG tablet Take 1 tablet (500 mg) by mouth 2 times daily (with meals) (Patient not taking: Reported on 5/20/2019)     No current facility-administered medications on file prior to visit.     ROS:   CONSTITUTIONAL: Denies fever, chills, fatigue.   HEENT: Denies headache, vision changes, and changes in speech.   CV: Refer to HPI.   PULMONARY:Refer to HPI.   GI:Denies nausea, vomiting, diarrhea, and abdominal pain. Bowel movements are regular.   :Denies urinary alterations, dysuria, urinary frequency, hematuria, and abnormal drainage.   EXT:+chronic lower extremity edema.   SKIN:Denies abnormal rashes or lesions.   MUSCULOSKELETAL:Denies upper or lower extremity weakness and pain.   NEUROLOGIC:Denies lightheadedness, dizziness, seizures, or upper or lower extremity paresthesia.     EXAM:  /83 (BP Location: Left arm, Patient Position: Chair, Cuff Size: Adult Large)   Pulse 88   Ht 1.829 m (6')   Wt (!) 173.9 kg (383 lb 6.4 oz)   SpO2 97%   BMI 52.00 kg/m     GENERAL: Appears comfortable, in no acute distress.uses a walker   HEENT: Eye symmetrical, no discharge or icterus bilaterally. Mucous membranes  moist and without lesions.  CV: Tachycardic and irregular, distant S1S2, no appreciable murmur, rub, or gallop. JVP not detected (examined in chair).   RESPIRATORY: Respirations regular, even, and unlabored. Lungs CTA throughout.   GI: Soft, obese, and non distended with normoactive bowel sounds present in all quadrants. No tenderness, rebound, guarding. No hepatomegaly.   EXTREMITIES: 1+bilat peripheral edema. 2+ bilateral pedal pulses.   NEUROLOGIC: Alert and oriented x 3. No focal deficits.   MUSCULOSKELETAL: No joint swelling or tenderness.   SKIN: No jaundice. No rashes.     Labs, reviewed with patient in clinic today:  CBC RESULTS:  Lab Results   Component Value Date    WBC 7.7 03/16/2019    RBC 4.76 03/16/2019    HGB 13.8 04/17/2019    HCT 46.0 04/17/2019    MCV 96.9 04/17/2019    MCH 29.1 04/17/2019    MCHC 30.0 (L) 04/17/2019    RDW 12.8 03/16/2019     03/16/2019       CMP RESULTS:  Lab Results   Component Value Date     05/20/2019    POTASSIUM 4.0 05/20/2019    CHLORIDE 101 05/20/2019    CO2 30 05/20/2019    ANIONGAP 7 05/20/2019     (H) 05/20/2019    BUN 13 05/20/2019    CR 1.18 05/20/2019    GFRESTIMATED 63 05/20/2019    GFRESTBLACK 73 05/20/2019    CHERI 10.0 05/20/2019    BILITOTAL 0.5 03/06/2019    ALBUMIN 3.5 03/06/2019    ALKPHOS 105 03/06/2019    ALT 18 03/06/2019    AST 14 03/06/2019        INR RESULTS:  Lab Results   Component Value Date    INR 2.5 05/10/2019       Lab Results   Component Value Date    MAG 2.2 03/23/2019     Lab Results   Component Value Date    NTBNPI 2,329 (H) 03/06/2019     Lab Results   Component Value Date    NTBNP 1,265 (H) 04/17/2019       Diagnostics:  ECG 1/6/18  Atrial fibrillation with PVCs, multifocal    TTE 10/30/18  Global and regional left ventricular function is normal with an EF of 55-60%.  No regional wall motion abnormalities are seen.  Mild to moderate right ventricular dilation is present. Global right  ventricular function is mildly to  moderately reduced.  No significant valve abnormalities.  Dilation of the inferior vena cava is present with normal respiratory  variation in diameter. Trivial pericardial effusion is present with no  hemodynamic significance.     This study was compared with the study from 05/18/2018. RV apopears somewhat  more dilated but otherwise no significant change.    [facility] 6/6/18        Assessment and Plan:   Mr. Moreira is a 68 year old male with prior history of systolic heart failure, most recent LVEF normal, also hx of morbid obesity, atrial fibrillation, THONG/alveolar hypoventilation, HTN, recent hospitalization of almost one month for Sepsis who presents to [facility] clinic for follow-up. The patient is extremely happy with how he is feeling.  We had a long discussion about resuming a small dose of Lisinopril but the patient is not in favor of doing that or any blood pressure medication at this point. He is willing to monitor his weights daily and his BP's and be more reliable with reporting any issues.     # Chronic borderline diastolic heart failure/HFpEF with improved EF  Stage C. NYHA Class III.  Fluid status: Euvolemic today - continue the 100 mg of Torsemide daily  ACEi/ARB/ARNI: Will need to reassess at visit in 3 months (see above)  BB: Toprol 100 mg daily  Aldosterone antagonist: spironolactone 25 mg bid (despite normal EF now, some benefit shown in TOPCAT trial)  SCD prophylaxis: n/a EF>40%  Ischemic evaluation: negative coronary angiogram 6/2018  Remote monitoring: uses [employer]     # Afib  -continue the Toprol 100 mg daily      # HTN  -controlled on HF therapies    # Atrial fibrillation ?permanant, hx of ablation per patient   [id number]   -he is on warfarin and Toprol      # THONG/alveolar hypoventilation  -encouraged consistent CPAP use       Follow up with Dr. Willoughby in 3 months with las prior      40 minutes spent face-to-face with patient, >50% in counseling and/or coordination of care as described  above        Mary Carmen KIRK, CNP    [facility] clinic               CC  GEOFFREY PARSONS

## 2019-05-20 NOTE — LETTER
"5/20/2019      RE: Clarke Moreira  [address]  [facility] [id number]       Dear Colleague,    Thank you for the opportunity to participate in the care of your patient, Clarke Moreira, at the [facility] HEART [facility] at [facility]. Please see a copy of my visit note below.      HPI:   Mr. Moreira is a 68 year old male with a past medical history of systolic heart failure, most recent LVEF normal, also hx of morbid obesity, atrial fibrillation s/p ablation 2008, THONG/alveolar hypoventilation, HTN, dyslipidemia, PAD, hypothyroidism, type2 DM, depression, anxiety, BPH and a recent hospitalization for Sepsis (3/7/19-4/3/19) with sinus infection.  Presents to clinic for CORE follow-up.     Recent dental extraction (2/26/19) who was admitted to [facility] 3/6 for sepsis and MICHELLE. Subsequently diagnosed with maxillary sinusitis (completed abx 3/12). MICHELLE resolved with IVF. Hospital stay c/b a-fib with RVR. Transferred to TCU for rehab.     Since then, he reports feeling much better. He says all his symptoms which he had since June of 2018 have resolved.  He was having issues with feeling faint, having visual disturbance. He is very happy with his current medication regimen which his PCP has helped with. He is not on any Lisinopril. He is taking 100 mg of Torsemide daily and 100 mg of Toprol. Apparently he had been discharged home on 100 mg Torsemide in am and 50 mg in the pm. He says his PCP discontinued the pm dose as the \"there was no benefit\".   Denies orthopnea, PND, cough, he wears CPAP. Functionally very limited due to body size but denies chest pain or shortness of breath with current level of exertion. He denies palpitations, presyncope, syncope, orthostasis. Weight at home today 379 lb. Patient reports having trouble sleeping due to anxiety and being alone.  He says his appetite is \"ok\" but has been eating more carb's than in the hospital.  He has tried Meals on " Wheels and says the meals were not palatable.      PAST MEDICAL HISTORY:  Past Medical History:   Diagnosis Date     Atrial fibrillation (H)      Basal cell carcinoma      Chronic anticoagulation      Diastolic heart failure (H)      Dyslipidemia      Essential hypertension      Morbid obesity (H)      Sleep-disordered breathing      Type 2 diabetes mellitus (H)        FAMILY HISTORY:  Family History   Problem Relation Age of Onset     Depression Mother      Glaucoma Maternal Grandfather      Cancer No family hx of         No family history of skin cancer     Macular Degeneration No family hx of        SOCIAL HISTORY:  Social History     Socioeconomic History     Marital status: Single     Spouse name: Not on file     Number of children: Not on file     Years of education: Not on file     Highest education level: Not on file   Occupational History     Not on file   Social Needs     Financial resource strain: Not on file     Food insecurity:     Worry: Not on file     Inability: Not on file     Transportation needs:     Medical: Not on file     Non-medical: Not on file   Tobacco Use     Smoking status: Never Smoker     Smokeless tobacco: Never Used   Substance and Sexual Activity     Alcohol use: No     Comment: Former alcohol abuse. Quit 70s then quit later in 80s-present     Drug use: No     Comment: history of LSD use     Sexual activity: Not on file   Lifestyle     Physical activity:     Days per week: Not on file     Minutes per session: Not on file     Stress: Not on file   Relationships     Social connections:     Talks on phone: Not on file     Gets together: Not on file     Attends Yazidi service: Not on file     Active member of club or organization: Not on file     Attends meetings of clubs or organizations: Not on file     Relationship status: Not on file     Intimate partner violence:     Fear of current or ex partner: Not on file     Emotionally abused: Not on file     Physically abused: Not on file      Forced sexual activity: Not on file   Other Topics Concern     Parent/sibling w/ CABG, MI or angioplasty before 65F 55M? Not Asked   Social History Narrative    Lives in an apartment in 16th floor near hospital.  Has elevators, unable to use stairs. Not able to shop; has things delivered to him including food. Difficulty completing cleaning. Goes to PCP once yearly for annual check up and medication review.       CURRENT MEDICATIONS:    Current Outpatient Medications on File Prior to Visit:  ammonium lactate (LAC-HYDRIN) 12 % external cream Apply topically 2 times daily as needed for dry skin   Ascorbic Acid (VITAMIN C) POWD Take 500 mcg by mouth daily   aspirin (ASA) 81 MG tablet Take 1 tablet (81 mg) by mouth daily   atorvastatin (LIPITOR) 40 MG tablet Take 1 tablet (40 mg) by mouth daily   blood glucose (ONETOUCH ULTRA) test strip Use to test blood sugars 2 times daily or as directed.   blood glucose monitoring (ONE TOUCH DELICA) lancets Use to test blood sugars 2 times daily or as directed.   blood glucose monitoring (ONE TOUCH ULTRA MINI) meter device kit Use to test blood sugars 2 times daily or as directed.   levothyroxine (SYNTHROID/LEVOTHROID) 175 MCG tablet Take 1 tablet (175 mcg) by mouth every morning (before breakfast)   metFORMIN (GLUCOPHAGE) 500 MG tablet Take 1 tablet (500 mg) by mouth 2 times daily (with meals)   metoprolol succinate ER (TOPROL-XL) 100 MG 24 hr tablet Take 1 tablet (100 mg) by mouth daily   multivitamin w/minerals (MULTI-VITAMIN) tablet Take 1 tablet by mouth daily   nystatin (MYCOSTATIN) 429817 UNIT/GM external cream Apply topically 2 times daily as needed for dry skin   omega 3 1000 MG CAPS Take 1 g by mouth daily   order for DME Equipment being ordered: Diabetes Shoes   order for DME Equipment being ordered: Custom diabetic shoes   order for DME Equipment being ordered: Bariatric Lift chairDiagnosis - morbid obesity, CHF, LymphedemaFax to Ne from [employer] at  [phone].   order for DME Equipment being ordered: Other: Velcro Compression stockings. Treatment Diagnosis: bilateral lymphedema.   polyethylene glycol (MIRALAX/GLYCOLAX) packet Take 17 g by mouth daily as needed for constipation   potassium chloride ER (K-DUR/KLOR-CON M) 20 MEQ CR tablet Take 2 tablets (40 mEq) by mouth 3 times daily X 1 week , then 2 tablets 2 times daily ongoing.   senna-docusate (SENOKOT-S/PERICOLACE) 8.6-50 MG tablet Take 1-2 tablets by mouth 2 times daily as needed for constipation   spironolactone (ALDACTONE) 25 MG tablet Take 1 tablet (25 mg) by mouth daily   tamsulosin (FLOMAX) 0.4 MG capsule Take 1 capsule (0.4 mg) by mouth daily   torsemide (DEMADEX) 100 MG tablet Take 1 tablet (100 mg) by mouth daily Take one tab (100mg) by mouth every morning, take one half tab (50mg) by mouth every evening   vitamin B complex with vitamin C (VITAMIN  B COMPLEX) tablet Take 1 tablet by mouth daily   vitamin E (VITAMIN E COMPLEX) 1000 units (450 mg) capsule Take 1 capsule (1,000 Units) by mouth daily   warfarin (COUMADIN) 5 MG tablet Take 12.5 mg by mouth  5x/week, Take 10 mg by mouth 2x per week   naproxen (NAPROSYN) 500 MG tablet Take 1 tablet (500 mg) by mouth 2 times daily (with meals) (Patient not taking: Reported on 5/20/2019)     No current facility-administered medications on file prior to visit.     ROS:   CONSTITUTIONAL: Denies fever, chills, fatigue.   HEENT: Denies headache, vision changes, and changes in speech.   CV: Refer to HPI.   PULMONARY:Refer to HPI.   GI:Denies nausea, vomiting, diarrhea, and abdominal pain. Bowel movements are regular.   :Denies urinary alterations, dysuria, urinary frequency, hematuria, and abnormal drainage.   EXT:+chronic lower extremity edema.   SKIN:Denies abnormal rashes or lesions.   MUSCULOSKELETAL:Denies upper or lower extremity weakness and pain.   NEUROLOGIC:Denies lightheadedness, dizziness, seizures, or upper or lower extremity paresthesia.      EXAM:  /83 (BP Location: Left arm, Patient Position: Chair, Cuff Size: Adult Large)   Pulse 88   Ht 1.829 m (6')   Wt (!) 173.9 kg (383 lb 6.4 oz)   SpO2 97%   BMI 52.00 kg/m      GENERAL: Appears comfortable, in no acute distress.uses a walker   HEENT: Eye symmetrical, no discharge or icterus bilaterally. Mucous membranes moist and without lesions.  CV: Tachycardic and irregular, distant S1S2, no appreciable murmur, rub, or gallop. JVP not detected (examined in chair).   RESPIRATORY: Respirations regular, even, and unlabored. Lungs CTA throughout.   GI: Soft, obese, and non distended with normoactive bowel sounds present in all quadrants. No tenderness, rebound, guarding. No hepatomegaly.   EXTREMITIES: 1+bilat peripheral edema. 2+ bilateral pedal pulses.   NEUROLOGIC: Alert and oriented x 3. No focal deficits.   MUSCULOSKELETAL: No joint swelling or tenderness.   SKIN: No jaundice. No rashes.     Labs, reviewed with patient in clinic today:  CBC RESULTS:  Lab Results   Component Value Date    WBC 7.7 03/16/2019    RBC 4.76 03/16/2019    HGB 13.8 04/17/2019    HCT 46.0 04/17/2019    MCV 96.9 04/17/2019    MCH 29.1 04/17/2019    MCHC 30.0 (L) 04/17/2019    RDW 12.8 03/16/2019     03/16/2019       CMP RESULTS:  Lab Results   Component Value Date     05/20/2019    POTASSIUM 4.0 05/20/2019    CHLORIDE 101 05/20/2019    CO2 30 05/20/2019    ANIONGAP 7 05/20/2019     (H) 05/20/2019    BUN 13 05/20/2019    CR 1.18 05/20/2019    GFRESTIMATED 63 05/20/2019    GFRESTBLACK 73 05/20/2019    CHERI 10.0 05/20/2019    BILITOTAL 0.5 03/06/2019    ALBUMIN 3.5 03/06/2019    ALKPHOS 105 03/06/2019    ALT 18 03/06/2019    AST 14 03/06/2019        INR RESULTS:  Lab Results   Component Value Date    INR 2.5 05/10/2019       Lab Results   Component Value Date    MAG 2.2 03/23/2019     Lab Results   Component Value Date    NTBNPI 2,329 (H) 03/06/2019     Lab Results   Component Value Date    [facility] 1,241  (H) 04/17/2019       Diagnostics:  ECG 1/6/18  Atrial fibrillation with PVCs, multifocal    TTE 10/30/18  Global and regional left ventricular function is normal with an EF of 55-60%.  No regional wall motion abnormalities are seen.  Mild to moderate right ventricular dilation is present. Global right  ventricular function is mildly to moderately reduced.  No significant valve abnormalities.  Dilation of the inferior vena cava is present with normal respiratory  variation in diameter. Trivial pericardial effusion is present with no  hemodynamic significance.     This study was compared with the study from 05/18/2018. RV apopears somewhat  more dilated but otherwise no significant change.    [facility] 6/6/18        Assessment and Plan:   Mr. Moreira is a 68 year old male with prior history of systolic heart failure, most recent LVEF normal, also hx of morbid obesity, atrial fibrillation, THONG/alveolar hypoventilation, HTN, recent hospitalization of almost one month for Sepsis who presents to CORE clinic for follow-up. The patient is extremely happy with how he is feeling.  We had a long discussion about resuming a small dose of Lisinopril but the patient is not in favor of doing that or any blood pressure medication at this point. He is willing to monitor his weights daily and his BP's and be more reliable with reporting any issues.     # Chronic borderline diastolic heart failure/HFpEF with improved EF  Stage C. NYHA Class III.  Fluid status: Euvolemic today - continue the 100 mg of Torsemide daily  ACEi/ARB/ARNI: Will need to reassess at visit in 3 months (see above)  BB: Toprol 100 mg daily  Aldosterone antagonist: spironolactone 25 mg bid (despite normal EF now, some benefit shown in TOPCAT trial)  SCD prophylaxis: n/a EF>40%  Ischemic evaluation: negative coronary angiogram 6/2018  Remote monitoring: uses [employer]     # Afib  -continue the Toprol 100 mg daily      # HTN  -controlled on HF therapies    # Atrial  fibrillation ?permanant, hx of ablation per patient   [id number] 4   -he is on warfarin and Toprol      # THONG/alveolar hypoventilation  -encouraged consistent CPAP use     Follow up with Dr. Parsons in 3 months with las prior  40 minutes spent face-to-face with patient, >50% in counseling and/or coordination of care as described above        Mary Carmen KIRK, CNP    [facility] clinic       CC  GEOFFREY PARSONS

## 2019-05-20 NOTE — TELEPHONE ENCOUNTER
Stanley GERONIMO with  HC calling,     In order to complete dressing changes with recommendations from 5/16/2019 OV patient will require a script for Bacitracin or Triple Antibiotic ointment as patient does not have any.     Please advise.     Pharmacy Td'up.     Sandra TURCIOS, RN, BSN, PHN

## 2019-05-20 NOTE — NURSING NOTE
Chief Complaint   Patient presents with     Follow Up     CORE, 67 yo male, HFrEF, EF 55-60%, and afib presents for follow up with labs prior.      Vitals were taken and medications were reconciled.     Bryanna Zuleta RMA  12:09 PM

## 2019-05-21 ENCOUNTER — OFFICE VISIT (OUTPATIENT)
Dept: PSYCHOLOGY | Facility: CLINIC | Age: 69
End: 2019-05-21
Payer: COMMERCIAL

## 2019-05-21 ENCOUNTER — OFFICE VISIT (OUTPATIENT)
Dept: PHARMACY | Facility: CLINIC | Age: 69
End: 2019-05-21
Payer: COMMERCIAL

## 2019-05-21 DIAGNOSIS — F33.1 MODERATE EPISODE OF RECURRENT MAJOR DEPRESSIVE DISORDER (H): Primary | ICD-10-CM

## 2019-05-21 DIAGNOSIS — I48.20 CHRONIC ATRIAL FIBRILLATION (H): Primary | ICD-10-CM

## 2019-05-21 DIAGNOSIS — F41.1 GAD (GENERALIZED ANXIETY DISORDER): ICD-10-CM

## 2019-05-21 RX ORDER — ATORVASTATIN CALCIUM 40 MG/1
40 TABLET, FILM COATED ORAL DAILY
Qty: 30 TABLET | Refills: 11 | Status: SHIPPED | OUTPATIENT
Start: 2019-05-21 | End: 2020-05-11

## 2019-05-21 RX ORDER — BACITRACIN ZINC 500 [USP'U]/G
30 OINTMENT TOPICAL 2 TIMES DAILY
Qty: 30 G | Refills: 1 | Status: SHIPPED | OUTPATIENT
Start: 2019-05-21 | End: 2022-05-09

## 2019-05-21 NOTE — PATIENT INSTRUCTIONS
Warfarin instructions:  Start taking 12.5 mg on Tuesday and Thursday and 10 mg on all other days.    Warfarin Weekly Dosing  Sunday Dose: 10 mg Monday Dose: 10 mg Tuesday Dose: 12.5 mg Wednesday Dose: 10 mg Thursday Dose: 12.5 mg Friday Dose: 10 mg Saturday Dose: 10 mg       Follow-up with RN home nurse on 5/31 for INR.       Jesika Byrd RPH

## 2019-05-21 NOTE — TELEPHONE ENCOUNTER
Bacitracin ointment prescribed; I used his [facility] pharmacy - [place].    Clarke Gómez, KIERRA, FACFAS, MS    [facility]

## 2019-05-21 NOTE — PROGRESS NOTES
Behavioral Health Progress Note    Client Legal Name: Clarke Moreira   Client Preferred Name: Clarke   Service Type: Individual  Length of Visit: 25 minutes  Attendees: patient     Identifying Information and Presenting Problem:    The patient is a 68 year old American male who is being seen for problematic symptoms of depression, ongoing adjustment to medical illness, as well as social isolation.  I last saw the patient on 4/25/19.  He arrived a little late today due to difficulties with his ride service.  Feeling very frustrated by that situation.      Treatment Objective(s) Addressed in This Session:    reports one goal is maintaining healthy eating patterns, feels as if he is returning to old patterns before he went into the hospital and believes this is indicative of his mental disease, doesn't want to do physical activity because then he has to leave his home.    Progress on / Status of Treatment Objective(s) / Homework:  Continuing to struggle with depression and very low view of himself.  Wants to connect, but finds it very hard to do so with other people.    PHQ-9 SCORE 10/22/2018 12/11/2018 5/10/2019   PHQ-9 Total Score - - -   PHQ-9 Total Score 21 25 22       CHRIS-7 SCORE 10/22/2018 12/11/2018 5/10/2019   Total Score - - -   Total Score 13 20 13     Topics Discussed/Interventions Provided:  Clarke characterizes himself as behaving like he did when he was a child.  Provided today as an example of how much difficulty he was having with the fact that the ride was late.  Reflected that despite thoughts that he would just stay home because things weren't going well, he still made the effort to come.  This is a very different approach than he has taken in the past.  He was able to play the situation forward and identify how that reaction could make it worse for him.  Explored his desires to connect with others, but how thinking gets in the way of making this a reality.   He tends to dismiss any positive  feedback or positive interactions to something other than himself.  Continued to gently challenge self-deprecating thoughts.    Assessment: The patient appeared to be active and engaged in today's session and was receptive to feedback.    Mental Status: Clarke appeared alert and oriented. He was dressed appropriately and well-groomed.  Despite irritation with circumstances behind getting to session, Clarke is engaged and pleasant throughout the session.  Eye contact was good, took his sunglasses off for the session. Speech was normal rate and rhythm.  Mood was described as frustrated.  Affect was congruent with stated mood and thoughts.  Thought processes were circumstantial at times.  Thought content was WNL with no evidence of psychotic or paranoid features. No evidence of SI/HI or self-harm, intent, or plans. Memory appeared grossly intact. Insight and judgment appeared good and patient exhibited good impulse control during the appointment.     Does the patient appear to be at imminent risk of harm to self/others at this time? No    The session was necessary to identify and challenge unhelpful thinking that occurs regarding his perception of himself and provide feedback on the healthy way in which he handled a setback today.  Symptoms of depression have continued to interfere with his ability to function at home and socially.  Ongoing psychotherapy is necessary to provide counseling and provide support.    Diagnosis (DSM-5):  Major Depression, recurrent, moderate  Generalized Anxiety Disorder  R/o persistent depressive disorder    Plan:  1. Follow up in 2 weeks.  2. Do tx plan at next visit.    NOTE: Treatment plan needed.  Diagnostic assessment update due 10/15/19.

## 2019-05-21 NOTE — PROGRESS NOTES
Clinical Pharmacy Note     Subjective:   Clarke Moreira came to the office today for anticoagulation monitoring, referred to us by Matthias Sanchez MD      1) ANTICOAGULATION   INR goal: 2.0 - 3.0       Since his last visit:    Diet/Med changes: None    Complications: None - slight bruising on his arms     Taking Warfarin as prescribed?: Yes    Taking any other anticoagulation medications? : No    Patient's Dose prior to today's visit: 70mg per week.    Current Warfarin dose:   Warfarin dose:    Sunday Dose: 10 mg    Monday Dose: 10 mg    Tuesday Dose: 12.5 mg    Wednesday Dose: 10 mg    Thursday Dose: 12.5 mg    Friday Dose: 10 mg    Saturday Dose: 10 mg     Lab Results   Component Value Date    INR 1.7 05/21/2019    INR 2.5 05/10/2019    INR 3.2 04/15/2019    INR 4.07 04/09/2019    INR 2.27 04/03/2019    INR 2.24 04/02/2019       2) Toe Gangrene     Sent to podiatry for debridement.  He is waiting for the pharmacy to deliver bacitracin to apply to his toes.      Current Outpatient Medications   Medication Sig Dispense Refill     ammonium lactate (LAC-HYDRIN) 12 % external cream Apply topically 2 times daily as needed for dry skin 385 g 0     Ascorbic Acid (VITAMIN C) POWD Take 500 mcg by mouth daily  0     aspirin (ASA) 81 MG tablet Take 1 tablet (81 mg) by mouth daily 30 tablet 0     atorvastatin (LIPITOR) 40 MG tablet Take 1 tablet (40 mg) by mouth daily 30 tablet 0     blood glucose (ONETOUCH ULTRA) test strip Use to test blood sugars 2 times daily or as directed. 100 strip 11     blood glucose monitoring (ONE TOUCH DELICA) lancets Use to test blood sugars 2 times daily or as directed. 100 each 11     blood glucose monitoring (ONE TOUCH ULTRA MINI) meter device kit Use to test blood sugars 2 times daily or as directed. 1 kit 0     levothyroxine (SYNTHROID/LEVOTHROID) 175 MCG tablet Take 1 tablet (175 mcg) by mouth every morning (before breakfast) 90 tablet 3     metFORMIN (GLUCOPHAGE) 500 MG tablet Take 1  tablet (500 mg) by mouth 2 times daily (with meals) 60 tablet 11     metoprolol succinate ER (TOPROL-XL) 100 MG 24 hr tablet Take 1 tablet (100 mg) by mouth daily 30 tablet 0     multivitamin w/minerals (MULTI-VITAMIN) tablet Take 1 tablet by mouth daily 30 each 0     naproxen (NAPROSYN) 500 MG tablet Take 1 tablet (500 mg) by mouth 2 times daily (with meals) (Patient not taking: Reported on 5/20/2019) 10 tablet 1     nystatin (MYCOSTATIN) 611583 UNIT/GM external cream Apply topically 2 times daily as needed for dry skin 30 g 11     omega 3 1000 MG CAPS Take 1 g by mouth daily 30 capsule 0     order for DME Equipment being ordered: Diabetes Shoes 1 Units 0     order for DME Equipment being ordered: Custom diabetic shoes 1 Units 0     order for DME Equipment being ordered: Bariatric Lift chair  Diagnosis - morbid obesity, CHF, Lymphedema    Fax to Ne from [employer] at [phone]. 1 Units 0     order for DME Equipment being ordered: Other: Velcro Compression stockings.   Treatment Diagnosis: bilateral lymphedema. 2 each 0     polyethylene glycol (MIRALAX/GLYCOLAX) packet Take 17 g by mouth daily as needed for constipation 15 packet 0     potassium chloride ER (K-DUR/KLOR-CON M) 20 MEQ CR tablet Take 2 tablets (40 mEq) by mouth 3 times daily X 1 week , then 2 tablets 2 times daily ongoing. 90 tablet 11     senna-docusate (SENOKOT-S/PERICOLACE) 8.6-50 MG tablet Take 1-2 tablets by mouth 2 times daily as needed for constipation 60 tablet 0     spironolactone (ALDACTONE) 25 MG tablet Take 1 tablet (25 mg) by mouth daily 30 tablet 6     tamsulosin (FLOMAX) 0.4 MG capsule Take 1 capsule (0.4 mg) by mouth daily 30 capsule 0     torsemide (DEMADEX) 100 MG tablet Take 1 tablet (100 mg) by mouth daily Take one tab (100mg) by mouth every morning, take one half tab (50mg) by mouth every evening 30 tablet 11     vitamin B complex with vitamin C (VITAMIN  B COMPLEX) tablet Take 1 tablet by mouth daily 100 tablet 3     vitamin  "E (VITAMIN E COMPLEX) 1000 units (450 mg) capsule Take 1 capsule (1,000 Units) by mouth daily 100 capsule 3     warfarin (COUMADIN) 5 MG tablet Take 12.5 mg by mouth  5x/week, Take 10 mg by mouth 2x per week 75 tablet 11       ASSESSMENT    1) ANTICOAGULATION   Status:    Anticoagulation Tx: Not at goal,Dosage too low.      Clarke is a 68 year old male presenting to clinic today for INR follow-up.  Today, his INR is 1.7 which is subtherapeutic.  He has not missed any doses and does not have any complications.  Based on this, his dose was increased to 75 mg of warfarin weekly.  Follow-up INR should be drawn in 1-2 weeks.  He has a home care nurse who is able to draw and INR and this is what he prefers.  Following Clarke's appointment, I spoke with Stanley Gandhi RN and provided a verbal order for an INR to be drawn on 5/31.      Based on today's visit:   - New Weekly Dose: 75mg Total weekly dose.   - Percent Change: 7.143%    Please see \"[facility] FM Anticoagulation Flowsheet\"     PLAN:    1)  Warfarin Pill size at home: 5 mg (peach)  See med list and patient instructions for dosing details.     Recheck INR: 1 - 2 weeks is recommended.      Warfarin Weekly Dosing  Sunday Dose: 10 mg Monday Dose: 10 mg Tuesday Dose: 12.5 mg Wednesday Dose: 10 mg Thursday Dose: 12.5 mg Friday Dose: 10 mg Saturday Dose: 10 mg     Patient/family verbalized their understanding. Patient was provided with written instructions/medication list via After Visit Summary.  No further questions or concerns at this time.     All medications were reviewed and found to be indicated, effective, safe and convenient unless drug therapy problems identified as described above.    Dr.Peter Laura MD was provided our recommendations via routed note and was available for supervision during this visit and is the authorizing prescriber for this visit through the pharmacist collaborative practice agreement.    Jesika Byrd, Pharm.D             Total Time: 15  # " DTPs Identified: 1

## 2019-05-31 ENCOUNTER — TELEPHONE (OUTPATIENT)
Dept: FAMILY MEDICINE | Facility: CLINIC | Age: 69
End: 2019-05-31

## 2019-05-31 NOTE — TELEPHONE ENCOUNTER
[facility] phone call message- medication clarification/question: Elena from [facility] called in to convey the message that the patient's INR is 3.2 and he is currently taking coumadin. Requesting a call back in regards to this.     Full Medication Name: Coumadin    Question/Clarification needed: Call back    Pharmacy confirmed as   [id number] - [place], [place] - [address]  [address]  [facility] [address]  Phone: [phone] Fax: [phone]    [facility] [place], [place] - [address]  [address]  [address]  [facility] [address]  Phone: [phone] Fax: [phone] Alternate Fax: [phone], [phone], [phone]  : Yes    Please leave ONLY preferred pharmacy    OK to leave a message on voice mail? Yes    Advised patient that RN would call back within 3 hours, unless emergent.    Primary language: English      needed? No    Call taken on May 31, 2019 at 9:25 AM by Monse Christy    Route to [facility] TRIAGE

## 2019-05-31 NOTE — TELEPHONE ENCOUNTER
RN was transferred a call from [place]. Elena in home care is following up on the call from this morning which was sent to PharmD without a response yet. They need dosing before the weekend. Patient is currently on 10 mg daily.  RN spoke with preceptor and relayed the current dose that he is on and the last INR that I saw on file. She recommended holding for 1 dose and continuing his current dose with a re-check in 1 week. After calling Elena with this and doing further chart review, it appears to be a fairly complicated INR dosing patient (with discrepancies between what PharmD instructed patient to take and what home care is telling me) and I consulted with her again and it has been determined that we will keep the recommendation we gave, however we would like PharmD to review Monday and advise on any changes that might be needed.  Kyra Jackson RN

## 2019-06-03 ENCOUNTER — OFFICE VISIT (OUTPATIENT)
Dept: PSYCHOLOGY | Facility: CLINIC | Age: 69
End: 2019-06-03
Payer: COMMERCIAL

## 2019-06-03 ENCOUNTER — OFFICE VISIT (OUTPATIENT)
Dept: PHARMACY | Facility: CLINIC | Age: 69
End: 2019-06-03

## 2019-06-03 DIAGNOSIS — F33.1 MODERATE EPISODE OF RECURRENT MAJOR DEPRESSIVE DISORDER (H): Primary | ICD-10-CM

## 2019-06-03 DIAGNOSIS — I48.19 PERSISTENT ATRIAL FIBRILLATION (H): ICD-10-CM

## 2019-06-03 DIAGNOSIS — F41.1 GAD (GENERALIZED ANXIETY DISORDER): ICD-10-CM

## 2019-06-03 RX ORDER — WARFARIN SODIUM 5 MG/1
TABLET ORAL
Qty: 75 TABLET | Refills: 11
Start: 2019-06-03 | End: 2020-05-11

## 2019-06-03 NOTE — TELEPHONE ENCOUNTER
Called RN back 06/03/19.      Orders given for warfarin (see 06/03/19 note)  Follow-up INR on 6/14/19    Lidia MedranoD.

## 2019-06-03 NOTE — PROGRESS NOTES
Behavioral Health Progress Note    Client Legal Name: Clarke Moreira   Client Preferred Name: Clarke   Service Type: Individual  Length of Visit: 45 minutes  Attendees: patient     Identifying Information and Presenting Problem:    The patient is a 68 year old American male who is being seen for problematic symptoms of depression, ongoing adjustment to medical illness, as well as social isolation.     Treatment Objective(s) Addressed in This Session:    reports one goal is maintaining healthy eating patterns, feels as if he is returning to old patterns before he went into the hospital and believes this is indicative of his mental disease, doesn't want to do physical activity because then he has to leave his home.    Progress on / Status of Treatment Objective(s) / Homework:  Reports no change in depressive symptoms, continues to have a very negative view of himself.    PHQ-9 SCORE 10/22/2018 12/11/2018 5/10/2019   PHQ-9 Total Score - - -   PHQ-9 Total Score 21 25 22       CHRIS-7 SCORE 10/22/2018 12/11/2018 5/10/2019   Total Score - - -   Total Score 13 20 13     Topics Discussed/Interventions Provided:  Medical System Frustration and Chronic illness - Clarke reports not feeling heard by his specialists.  Had a recent appointment and felt like they only read parts of the notes instead of hearing what he was saying about his experience.  Information given was contradictory to what was decided in the hospital.  Feels that trust is vital to being able to do all he needs to care for his health and doesn't feel trusting when it doesn't feel like care providers are reading his chart throughly/not listening to his input.  Clarke reports these kinds of situations bring up a lot of anger and typically wants to just storm away and not stay involved.  Was able to moderate anger during this visit and stayed.  Will continue to follow-up despite frustration.  This is a improvement for Clarke.      Reports that he feels like  he is eating all of the time.  Has started to eat at 2 am, trying to comfort himself with food.  Wants to fill up the pain with something that is comforting.  Struggling to stay in the moment and present/invested in his own life when he feels so helpless and frozen.  States he has no emotional resources.  Together we looked at several examples he shared where he demonstrated that there are some emotional resources present, but it is difficult for him to recognize.        Situation at housing - received some papers in the mail.  Assumed they were asking him to fill out same forms he already filled out and became angry without looking at the papers more closely.  Gets scared that anything he does wrong may be a way for his landlord to evict him. Realizes there is no evidence that they are trying to evict him.  Discussed next steps he can take in this situation so that he can advocate for himself.     Assessment: The patient appeared to be active and engaged in today's session and was receptive to feedback.    Mental Status: Clarke appeared alert and oriented. He was dressed appropriately and well-groomed.   Eye contact was good, took his sunglasses off for the session. Speech was normal rate and rhythm.  Mood was described as down and anxious.  Affect was congruent with stated mood and thoughts.  Thought processes were circumstantial at times.  Thought content was WNL with no evidence of psychotic or paranoid features. No evidence of SI/HI or self-harm, intent, or plans. Memory appeared grossly intact. Insight and judgment appeared good and patient exhibited good impulse control during the appointment.     Does the patient appear to be at imminent risk of harm to self/others at this time? No    The session was necessary to identify and challenge unhelpful thinking that occurs regarding his perception of himself and problem solved a situation that is occurring with his housing.  Symptoms of depression and anxiety have  continued to interfere with his ability to function at home and socially.  Ongoing psychotherapy is necessary to provide counseling and provide support.    Diagnosis (DSM-5):  Major Depression, recurrent, moderate  Generalized Anxiety Disorder  R/o persistent depressive disorder    Plan:  1. Follow up in 1-2 weeks.  2. Read the papers that came in the mail and go to the office to ask questions if needed  3. Do tx plan at next visit.    NOTE: Treatment plan needed.  Diagnostic assessment update due 10/15/19.

## 2019-06-03 NOTE — PROGRESS NOTES
Clinical Pharmacy Note     Subjective:   Clarke Moreira came to the office today for anticoagulation monitoring, referred to us by Matthias Sanchez MD      1) ANTICOAGULATION     INR goal: 2.0 - 3.0         Since his last visit:    Diet/Med changes: None    Complications: None    Anticoagulation related Taking Warfarin as prescribed?: Yes             Patient's Dose prior to today's visit: 75mg per week.    Current Warfarin dose:    Warfarin dose:    Sunday Dose: 10 mg    Monday Dose: 10 mg    Tuesday Dose: 12.5 mg    Wednesday Dose: 10 mg    Thursday Dose: 12.5 mg    Friday Dose: 10 mg    Saturday Dose: 10 mg     Lab Results   Component Value Date    INR 1.7 05/21/2019    INR 2.5 05/10/2019    INR 3.2 04/15/2019    INR 4.07 04/09/2019    INR 2.27 04/03/2019    INR 2.24 04/02/2019       Current Outpatient Medications   Medication Sig Dispense Refill     warfarin (COUMADIN) 5 MG tablet Take 12.5 mg by mouth  1x/week, Take 10 mg by mouth 6x per week 75 tablet 11     ammonium lactate (LAC-HYDRIN) 12 % external cream Apply topically 2 times daily as needed for dry skin 385 g 0     Ascorbic Acid (VITAMIN C) POWD Take 500 mcg by mouth daily  0     aspirin (ASA) 81 MG tablet Take 1 tablet (81 mg) by mouth daily 30 tablet 0     atorvastatin (LIPITOR) 40 MG tablet Take 1 tablet (40 mg) by mouth daily 30 tablet 11     bacitracin 500 UNIT/GM external ointment Apply 30 g topically 2 times daily 30 g 1     blood glucose (ONETOUCH ULTRA) test strip Use to test blood sugars 2 times daily or as directed. 100 strip 11     blood glucose monitoring (ONE TOUCH DELICA) lancets Use to test blood sugars 2 times daily or as directed. 100 each 11     blood glucose monitoring (ONE TOUCH ULTRA MINI) meter device kit Use to test blood sugars 2 times daily or as directed. 1 kit 0     levothyroxine (SYNTHROID/LEVOTHROID) 175 MCG tablet Take 1 tablet (175 mcg) by mouth every morning (before breakfast) 90 tablet 3     metFORMIN (GLUCOPHAGE) 500  MG tablet Take 1 tablet (500 mg) by mouth 2 times daily (with meals) 60 tablet 11     metoprolol succinate ER (TOPROL-XL) 100 MG 24 hr tablet Take 1 tablet (100 mg) by mouth daily 30 tablet 0     multivitamin w/minerals (MULTI-VITAMIN) tablet Take 1 tablet by mouth daily 30 each 0     naproxen (NAPROSYN) 500 MG tablet Take 1 tablet (500 mg) by mouth 2 times daily (with meals) (Patient not taking: Reported on 5/20/2019) 10 tablet 1     nystatin (MYCOSTATIN) 977768 UNIT/GM external cream Apply topically 2 times daily as needed for dry skin 30 g 11     omega 3 1000 MG CAPS Take 1 g by mouth daily 30 capsule 0     order for DME Equipment being ordered: Diabetes Shoes 1 Units 0     order for DME Equipment being ordered: Custom diabetic shoes 1 Units 0     order for DME Equipment being ordered: Bariatric Lift chair  Diagnosis - morbid obesity, CHF, Lymphedema    Fax to Ne from [employer] at [phone]. 1 Units 0     order for DME Equipment being ordered: Other: Velcro Compression stockings.   Treatment Diagnosis: bilateral lymphedema. 2 each 0     polyethylene glycol (MIRALAX/GLYCOLAX) packet Take 17 g by mouth daily as needed for constipation 15 packet 0     potassium chloride ER (K-DUR/KLOR-CON M) 20 MEQ CR tablet Take 2 tablets (40 mEq) by mouth 3 times daily X 1 week , then 2 tablets 2 times daily ongoing. 90 tablet 11     senna-docusate (SENOKOT-S/PERICOLACE) 8.6-50 MG tablet Take 1-2 tablets by mouth 2 times daily as needed for constipation 60 tablet 0     spironolactone (ALDACTONE) 25 MG tablet Take 1 tablet (25 mg) by mouth daily 30 tablet 6     tamsulosin (FLOMAX) 0.4 MG capsule Take 1 capsule (0.4 mg) by mouth daily 30 capsule 0     torsemide (DEMADEX) 100 MG tablet Take 1 tablet (100 mg) by mouth daily Take one tab (100mg) by mouth every morning, take one half tab (50mg) by mouth every evening 30 tablet 11     vitamin B complex with vitamin C (VITAMIN  B COMPLEX) tablet Take 1 tablet by mouth daily 100  "tablet 3     vitamin E (VITAMIN E COMPLEX) 1000 units (450 mg) capsule Take 1 capsule (1,000 Units) by mouth daily 100 capsule 3       ASSESSMENT    1) ANTICOAGULATION   Status:    Anticoagulation Tx: Not at goal,Dose too high.      Based on today's visit:   - New Weekly Dose: 72.5mg Total weekly dose.   - Percent Change: -3.333%    Please see \"[facility] Anticoagulation Flowsheet\"           PLAN:    1)  Warfarin    See med list and patient instructions for dosing details.     Recheck INR: 10 days is recommended.   INR Recheck Date: 06/14/19 approximate.      Warfarin Weekly Dosing  Sunday Dose: 10 mg Monday Dose: 10 mg Tuesday Dose: 12.5 mg Wednesday Dose: 10 mg Thursday Dose: 10 mg Friday Dose: 10 mg Saturday Dose: 10 mg   Additional Week Dosing (will be blank, if not needed)                          Patient/family verbalized their understanding. Patient was provided with written instructions/medication list via After Visit Summary.  No further questions or concerns at this time.     All medications were reviewed and found to be indicated, effective, safe and convenient unless drug therapy problems identified as described above.    Dr.Peter Laura MD was provided our recommendations via in clinic and was available for supervision during this visit and is the authorizing prescriber for this visit through the pharmacist collaborative practice agreement.    Tj Palumbo, Pharm.D             Total Time: 15  # DTPs Identified: 1  "

## 2019-06-03 NOTE — PROGRESS NOTES
I have verified the content of the note, which accurately reflects my assessment of the patient and the plan of care.   Tj Palumbo, PharmD

## 2019-06-03 NOTE — PATIENT INSTRUCTIONS
Warfarin instructions:    Warfarin Weekly Dosing  Sunday Dose: 10 mg Monday Dose: 10 mg Tuesday Dose: 12.5 mg Wednesday Dose: 10 mg Thursday Dose: 10 mg Friday Dose: 10 mg Saturday Dose: 10 mg          Recheck INR: 10 days      Tj Palumbo

## 2019-06-05 ENCOUNTER — DOCUMENTATION ONLY (OUTPATIENT)
Dept: FAMILY MEDICINE | Facility: CLINIC | Age: 69
End: 2019-06-05

## 2019-06-05 NOTE — PROGRESS NOTES
"When opening a documentation only encounter, be sure to enter in \"Chief Complaint\" Forms and in \" Comments\" Title of form, description if needed.    Clarke is a 69 year old  male  Form received via: Fax  Form now resides in: Provider Ready      Form has been completed by provider.     Form sent out via: Fax to [facility] at Fax Number: [phone]  Patient informed: na  Output date: June 5, 2019    Bisi Arrieta CMA      **Please close the encounter**      "

## 2019-06-05 NOTE — PROGRESS NOTES
"When opening a documentation only encounter, be sure to enter in \"Chief Complaint\" Forms and in \" Comments\" Title of form, description if needed.    Clarke is a 69 year old  male  Form received via: Fax  Form now resides in: Provider Ready      Form has been completed by provider.     Form sent out via: Fax to [facility] at Fax Number: [phone]  Patient informed: na  Output date: June 5, 2019    Bisi Arrieta CMA      **Please close the encounter**      "

## 2019-06-07 ENCOUNTER — TELEPHONE (OUTPATIENT)
Dept: FAMILY MEDICINE | Facility: CLINIC | Age: 69
End: 2019-06-07

## 2019-06-07 NOTE — TELEPHONE ENCOUNTER
Returned call to home care nurse, unable to reach. Left VM with verbal orders for skilled nursing  per protocol as requested. Left callback number for questions.    Bladimir Campbell RN

## 2019-06-07 NOTE — TELEPHONE ENCOUNTER
[facility] phone call message - order or referral request from patient:     Order or referral being requested: 2 Prn visits for skilled nursing  Additional Details:     Referral only -Specialty  Location     OK to leave a message on voice mail? Yes    Primary language: English      needed? No    Call taken on June 7, 2019 at 1:49 PM by Yajaria Longoria    Order request route to [facility] TRIAGE   Referrals Route to [facility] ([place]) CARE COORDINATOR

## 2019-06-10 ENCOUNTER — OFFICE VISIT (OUTPATIENT)
Dept: PSYCHOLOGY | Facility: CLINIC | Age: 69
End: 2019-06-10
Payer: COMMERCIAL

## 2019-06-10 DIAGNOSIS — I48.0 PAROXYSMAL ATRIAL FIBRILLATION (H): ICD-10-CM

## 2019-06-10 DIAGNOSIS — F33.1 MODERATE EPISODE OF RECURRENT MAJOR DEPRESSIVE DISORDER (H): Primary | ICD-10-CM

## 2019-06-10 DIAGNOSIS — F41.1 GAD (GENERALIZED ANXIETY DISORDER): ICD-10-CM

## 2019-06-10 RX ORDER — METOPROLOL SUCCINATE 100 MG/1
100 TABLET, EXTENDED RELEASE ORAL DAILY
Qty: 30 TABLET | Refills: 11 | Status: SHIPPED | OUTPATIENT
Start: 2019-06-10 | End: 2020-06-08

## 2019-06-10 NOTE — PROGRESS NOTES
"Behavioral Health Progress Note    Client Legal Name: Clarke Moreira   Client Preferred Name: Clarke   Service Type: Individual  Length of Visit: 40 minutes  Attendees: patient     Identifying Information and Presenting Problem:    The patient is a 68 year old American male who is being seen for problematic symptoms of depression, ongoing adjustment to medical illness, as well as social isolation.  He arrived about 20 minutes late for the appointment due to a late medical ride.  The patient after did not show, so we were able to have a full session today.  Discussed that it might be helpful to tell the medical ride people that his appointment is 15 minutes before it's actual time.    Treatment Objective(s) Addressed in This Session:    reports one goal is maintaining healthy eating patterns, feels as if he is returning to old patterns before he went into the hospital and believes this is indicative of his mental disease, doesn't want to do physical activity because then he has to leave his home.    Progress on / Status of Treatment Objective(s) / Homework:  Reports no change in depressive symptoms, continues to have a very negative view of himself.    PHQ-9 SCORE 10/22/2018 12/11/2018 5/10/2019   PHQ-9 Total Score - - -   PHQ-9 Total Score 21 25 22       CHRIS-7 SCORE 10/22/2018 12/11/2018 5/10/2019   Total Score - - -   Total Score 13 20 13     Topics Discussed/Interventions Provided:  \"It's too late for me\"  Clarke reports feeling discouraged and hopeless today.  His medical ride showed up at 10:45 to get him to his 10:40 appointment.  They were supposed to be there at 10am.  When these types of situations happen, Clarke has thoughts about his worthlessness and disposability as a human being.  At one level, he can identify that this thinking is distorted and unhelpful.  At the same time, it feels very true to him.  It confirms for him that he is invisible to others.  Reports ongoing thoughts of suicide that " increase when something like a late medical ride happens.  Denies any plan or intent to harm himself.      When the ride was late today, he could feel himself getting more and more angry.  He wanted to tell them off and cancel his appointment.  Realized that this would only impact him in a negative way, not them.  Reframed this to be a challenge that the universe is throwing at him to see if he will really expend the energy to invest in himself by attending therapy.  Provided positive reinforcement for his ability to reframe this event and make choices that moved him toward his goals for himself.    Clarke did go into the apartment office to have a conversation with them about the paperwork.  They did send him the same paperwork he had already completed in January.  He reports that it was very helpful to talk through how he could have this conversation prior to going in there.  He was able to express what he needed from them without anger.  They told him they would leave a message for the person that he needed to talk to call him. This person still has not called one week later.  Discussed his options in this situation. Clarke thought he would be a pest if he called them to check-in.  Encouraged him to advocate for himself and call to f/u.    Assessment: The patient appeared to be active and engaged in today's session and was receptive to feedback.    Mental Status: Clarke appeared alert and oriented. He was dressed and groomed appropriately.   Eye contact was good.Speech was normal rate and rhythm.  Mood was described as upset and down.  Affect appeared sad, defeated.  Thought processes were logical and coherent.  Thought content was WNL with no evidence of psychotic or paranoid features. No evidence of SI/HI or self-harm, intent, or plans. Memory appeared grossly intact. Insight and judgment appeared good and patient exhibited good impulse control during the appointment.     Does the patient appear to be at  imminent risk of harm to self/others at this time? No    The session was necessary to identify and challenge unhelpful thinking that occurs regarding his perception of himself and reinforced his ability to reframe these thoughts.  Symptoms of depression and anxiety have continued to interfere with his ability to function at home and socially.  Ongoing psychotherapy is necessary to provide counseling and provide support.    Diagnosis (DSM-5):  Major Depression, recurrent, moderate  Generalized Anxiety Disorder  R/o persistent depressive disorder    Plan:  1. Follow up in 1 week  2. Call back apartment office  3. Do tx plan at next visit.    NOTE: Treatment plan needed.  Diagnostic assessment update due 10/15/19.

## 2019-06-10 NOTE — TELEPHONE ENCOUNTER
"Request for medication refill:metoprolol succinate ER (TOPROL-XL) 100 MG 24 hr tablet    Providers if patient needs an appointment and you are willing to give a one month supply please refill for one month and  send a letter/MyChart using \".SMILLIMITEDREFILL\" .smillimited and route chart to \"P SMI \" (Giving one month refill in non controlled medications is strongly recommended before denial)    If refill has been denied, meaning absolutely no refills without visit, please complete the smart phrase \".smirxrefuse\" and route it to the \"P SMI MED REFILLS\"  pool to inform the patient and the pharmacy.    Law Na, MA        "

## 2019-06-12 ENCOUNTER — OFFICE VISIT (OUTPATIENT)
Dept: DERMATOLOGY | Facility: CLINIC | Age: 69
End: 2019-06-12
Payer: COMMERCIAL

## 2019-06-12 DIAGNOSIS — D48.5 NEOPLASM OF UNCERTAIN BEHAVIOR OF SKIN: Primary | ICD-10-CM

## 2019-06-12 DIAGNOSIS — Z85.828 HISTORY OF BASAL CELL CARCINOMA: ICD-10-CM

## 2019-06-12 RX ORDER — LIDOCAINE HYDROCHLORIDE AND EPINEPHRINE 10; 10 MG/ML; UG/ML
3 INJECTION, SOLUTION INFILTRATION; PERINEURAL ONCE
Status: DISCONTINUED | OUTPATIENT
Start: 2019-06-12 | End: 2020-05-12

## 2019-06-12 ASSESSMENT — PAIN SCALES - GENERAL
PAINLEVEL: NO PAIN (0)
PAINLEVEL: NO PAIN (0)

## 2019-06-12 NOTE — PROGRESS NOTES
[facility] Dermatology Note      Dermatology Problem List:  1. History of BCC on left forehead which was not treated - 08/2014   -patient never made follow up appointment for Mohs with Dr. Moreno in 2014. Re-biopsy today 6/12/19 - will plan on MMS once path is back  2. Stasis Dermatitis     Encounter Date: Jun 12, 2019    CC:  Chief Complaint   Patient presents with     Skin Check     Clarke is here today to have a spot on his forehead looked at.        History of Present Illness:  Mr. Clarke Moreira is a 69 year old male who is a new patient to the dermatology clinic present for FBSE. Patient was last seen by Dr. Ann on 8/20/14 when a biopsy was preformed on the left forehead. Pathology results indicated Basal cell carcinoma, nodular and micronodular type, extending to the lateral and deep margins. Patient was referred to go see Dr. Moreno at Derm Surg, but patient never made the appointment.  Patient is a referral by Dr. Matthias Sanchez today. Dr. Rizzo notes a lesion of concern on the left upper forehead. At today's visit patient notes that he completley forgot about the lesion on the left forehead. He was going through a hard time in his life that caused him to forget to come in and have it treated. The patient denies painful, itching, tingling or bleeding lesions unless otherwise noted. Declines FBSE today.    Past Medical History:   Patient Active Problem List   Diagnosis     Atrial fibrillation (H)     Hypothyroidism     Basal cell carcinoma of skin of face     CTS (carpal tunnel syndrome)     Myopia     Plantar fasciitis     Presbyopia     Regular astigmatism     Neoplasm of uncertain behavior of skin     Venous stasis     Type 2 diabetes mellitus with hyperglycemia, without long-term current use of insulin (H)     Morbid obesity (H)     Depression with anxiety     Hyperlipidemia LDL goal <100     Fall     BiPAP (biphasic positive airway pressure) dependence     Lymphedema      Chronic systolic congestive heart failure (H)     Essential hypertension with goal blood pressure less than 140/90     Urinary retention     Constipation, unspecified constipation type     Cellulitis     Onychomycosis     (HFpEF) heart failure with preserved ejection fraction (H)     Chronic hypercapnic respiratory failure (H)     Hypoventilation associated with obesity syndrome (H)     Pre-syncope     Elevated serum creatinine     PAD (peripheral artery disease) (H)     THONG (obstructive sleep apnea)     Hypotension     Adequate nutrition     Past Medical History:   Diagnosis Date     Atrial fibrillation (H)      Basal cell carcinoma      Chronic anticoagulation      Diastolic heart failure (H)      Dyslipidemia      Essential hypertension      Morbid obesity (H)      Sleep-disordered breathing      Type 2 diabetes mellitus (H)      Past Surgical History:   Procedure Laterality Date     BIOPSY OF SKIN LESION       MOHS MICROGRAPHIC PROCEDURE       PICC INSERTION Right 09/24/2017    5fr TL Bard PICC, 51cm (1cm external) in the R medial brachial vein w/ tip in the SVC.       Social History:   reports that he has never smoked. He has never used smokeless tobacco. He reports that he does not drink alcohol or use drugs.    Family History:  Family History   Problem Relation Age of Onset     Depression Mother      Glaucoma Maternal Grandfather      Cancer No family hx of         No family history of skin cancer     Macular Degeneration No family hx of        Medications:  Current Outpatient Medications   Medication Sig Dispense Refill     ammonium lactate (LAC-HYDRIN) 12 % external cream Apply topically 2 times daily as needed for dry skin 385 g 0     Ascorbic Acid (VITAMIN C) POWD Take 500 mcg by mouth daily  0     aspirin (ASA) 81 MG tablet Take 1 tablet (81 mg) by mouth daily 30 tablet 0     atorvastatin (LIPITOR) 40 MG tablet Take 1 tablet (40 mg) by mouth daily 30 tablet 11     bacitracin 500 UNIT/GM external ointment  Apply 30 g topically 2 times daily 30 g 1     blood glucose (ONETOUCH ULTRA) test strip Use to test blood sugars 2 times daily or as directed. 100 strip 11     blood glucose monitoring (ONE TOUCH DELICA) lancets Use to test blood sugars 2 times daily or as directed. 100 each 11     blood glucose monitoring (ONE TOUCH ULTRA MINI) meter device kit Use to test blood sugars 2 times daily or as directed. 1 kit 0     levothyroxine (SYNTHROID/LEVOTHROID) 175 MCG tablet Take 1 tablet (175 mcg) by mouth every morning (before breakfast) 90 tablet 3     metFORMIN (GLUCOPHAGE) 500 MG tablet Take 1 tablet (500 mg) by mouth 2 times daily (with meals) 60 tablet 11     metoprolol succinate ER (TOPROL-XL) 100 MG 24 hr tablet Take 1 tablet (100 mg) by mouth daily 30 tablet 11     multivitamin w/minerals (MULTI-VITAMIN) tablet Take 1 tablet by mouth daily 30 each 0     nystatin (MYCOSTATIN) 816277 UNIT/GM external cream Apply topically 2 times daily as needed for dry skin 30 g 11     omega 3 1000 MG CAPS Take 1 g by mouth daily 30 capsule 0     order for DME Equipment being ordered: Diabetes Shoes 1 Units 0     order for DME Equipment being ordered: Custom diabetic shoes 1 Units 0     order for DME Equipment being ordered: Bariatric Lift chair  Diagnosis - morbid obesity, CHF, Lymphedema    Fax to Ne from [employer] at [phone]. 1 Units 0     order for DME Equipment being ordered: Other: Velcro Compression stockings.   Treatment Diagnosis: bilateral lymphedema. 2 each 0     polyethylene glycol (MIRALAX/GLYCOLAX) packet Take 17 g by mouth daily as needed for constipation 15 packet 0     potassium chloride ER (K-DUR/KLOR-CON M) 20 MEQ CR tablet Take 2 tablets (40 mEq) by mouth 3 times daily X 1 week , then 2 tablets 2 times daily ongoing. 90 tablet 11     senna-docusate (SENOKOT-S/PERICOLACE) 8.6-50 MG tablet Take 1-2 tablets by mouth 2 times daily as needed for constipation 60 tablet 0     spironolactone (ALDACTONE) 25 MG tablet  Take 1 tablet (25 mg) by mouth daily 30 tablet 6     tamsulosin (FLOMAX) 0.4 MG capsule Take 1 capsule (0.4 mg) by mouth daily 30 capsule 0     torsemide (DEMADEX) 100 MG tablet Take 1 tablet (100 mg) by mouth daily Take one tab (100mg) by mouth every morning, take one half tab (50mg) by mouth every evening 30 tablet 11     vitamin B complex with vitamin C (VITAMIN  B COMPLEX) tablet Take 1 tablet by mouth daily 100 tablet 3     vitamin E (VITAMIN E COMPLEX) 1000 units (450 mg) capsule Take 1 capsule (1,000 Units) by mouth daily 100 capsule 3     warfarin (COUMADIN) 5 MG tablet Take 12.5 mg by mouth  1x/week, Take 10 mg by mouth 6x per week 75 tablet 11     naproxen (NAPROSYN) 500 MG tablet Take 1 tablet (500 mg) by mouth 2 times daily (with meals) (Patient not taking: Reported on 5/20/2019) 10 tablet 1       No Known Allergies    Review of Systems:  -Constitutional: The patient denies fatigue, fevers, chills, unintended weight loss, and night sweats.  -HEENT: Patient denies nonhealing oral sores.  -Skin: As above in HPI. No additional skin concerns.  -Heme/Lymph: no concerning bumps, no bleeding or bruising problems     Physical exam:  Vitals: There were no vitals taken for this visit.  GEN: This is a well developed, well-nourished male in no acute distress, in a pleasant mood.    SKIN: Focused examination of the face and scalp was performed.  -On the left forehead there is a 3.5 x 2cm lesion with central depression ulceration and a pearly rolled border. Lesion was biopsied years ago - on 8/20/14 which indicated BCC at the time  -No other lesions of concern on areas examined.     Impression/Plan:  1. BCC/Neoplasm of uncertain behavior on the left forehead. The differential diagnosis includes nodular/micronodular BCC vs infiltrative BCC vs Other. Lesion was initially biopsied in 2014 and patient was supposed to has MOHS with Dr. Moreno, however patient never followed for he procedure and never treated the lesion. It  has since grown, almost doubled in size.     Shave biopsy:  After discussion of benefits and risks including but not limited to bleeding/bruising, pain/swelling, infection, scar, incomplete removal, nerve damage/numbness, recurrence, and non-diagnostic biopsy, written consent, verbal consent and photographs were obtained. Time-out was performed. The area was cleaned with isopropyl alcohol. 0.5ml of 1% lidocaine with 1:100,000 epinephrine was injected to obtain adequate anesthesia. A shave biopsy was performed. Hemostasis was achieved with aluminium chloride. Vaseline and a sterile dressing were applied. The patient tolerated the procedure and no complications were noted. The patient was provided with verbal and written post care instructions.    Photograph was obtained for clinical monitoring and inclusion in medical record.    Will plan for MMS with derm surg once pathology is back    Recommended FBSE, patient defers today    CC Dr. Hastings on close of this encounter.  Follow-up prn for new or changing lesions.        Staff Involved:  Staff/Scribe    Scribe Disclosure:  I, Martin Jordan, am serving as a scribe to document services personally performed by Jennifer oLu PA-C, based on data collection and the provider's statements to me.     Provider Disclosure:   The documentation recorded by the scribe accurately reflects the services I personally performed and the decisions made by me.    All risks, benefits and alternatives were discussed with patient.  Patient is in agreement and understands the assessment and plan.  All questions were answered.    Jennifer Lou PA-C  [facility]: Phone: [phone], Fax: [phone]

## 2019-06-12 NOTE — PATIENT INSTRUCTIONS

## 2019-06-12 NOTE — LETTER
6/12/2019       RE: Clarke Moreira  [address]  [place] [address]     Dear Colleague,    Thank you for referring your patient, Clarke Moreira, to the [facility] DERMATOLOGY at [facility]. Please see a copy of my visit note below.    [facility] Dermatology Note      Dermatology Problem List:  1. History of BCC on left forehead which was not treated - 08/2014   -patient never made follow up appointment for Mohs with Dr. Moreno in 2014. Re-biopsy today 6/12/19 - will plan on MMS once path is back  2. Stasis Dermatitis     Encounter Date: Jun 12, 2019    CC:  Chief Complaint   Patient presents with     Skin Check     Clarke is here today to have a spot on his forehead looked at.        History of Present Illness:  Mr. Clarke Moreira is a 69 year old male who is a new patient to the dermatology clinic present for FBSE. Patient was last seen by Dr. Ann on 8/20/14 when a biopsy was preformed on the left forehead. Pathology results indicated Basal cell carcinoma, nodular and micronodular type, extending to the lateral and deep margins. Patient was referred to go see Dr. Moreno at Derm Surg, but patient never made the appointment.  Patient is a referral by Dr. Matthias Sanchez today. Dr. Rizzo notes a lesion of concern on the left upper forehead. At today's visit patient notes that he completley forgot about the lesion on the left forehead. He was going through a hard time in his life that caused him to forget to come in and have it treated. The patient denies painful, itching, tingling or bleeding lesions unless otherwise noted. Declines FBSE today.    Past Medical History:   Patient Active Problem List   Diagnosis     Atrial fibrillation (H)     Hypothyroidism     Basal cell carcinoma of skin of face     CTS (carpal tunnel syndrome)     Myopia     Plantar fasciitis     Presbyopia     Regular astigmatism     Neoplasm of uncertain behavior  of skin     Venous stasis     Type 2 diabetes mellitus with hyperglycemia, without long-term current use of insulin (H)     Morbid obesity (H)     Depression with anxiety     Hyperlipidemia LDL goal <100     Fall     BiPAP (biphasic positive airway pressure) dependence     Lymphedema     Chronic systolic congestive heart failure (H)     Essential hypertension with goal blood pressure less than 140/90     Urinary retention     Constipation, unspecified constipation type     Cellulitis     Onychomycosis     (HFpEF) heart failure with preserved ejection fraction (H)     Chronic hypercapnic respiratory failure (H)     Hypoventilation associated with obesity syndrome (H)     Pre-syncope     Elevated serum creatinine     PAD (peripheral artery disease) (H)     THONG (obstructive sleep apnea)     Hypotension     Adequate nutrition     Past Medical History:   Diagnosis Date     Atrial fibrillation (H)      Basal cell carcinoma      Chronic anticoagulation      Diastolic heart failure (H)      Dyslipidemia      Essential hypertension      Morbid obesity (H)      Sleep-disordered breathing      Type 2 diabetes mellitus (H)      Past Surgical History:   Procedure Laterality Date     BIOPSY OF SKIN LESION       MOHS MICROGRAPHIC PROCEDURE       PICC INSERTION Right 09/24/2017    5fr TL Bard PICC, 51cm (1cm external) in the R medial brachial vein w/ tip in the SVC.       Social History:   reports that he has never smoked. He has never used smokeless tobacco. He reports that he does not drink alcohol or use drugs.    Family History:  Family History   Problem Relation Age of Onset     Depression Mother      Glaucoma Maternal Grandfather      Cancer No family hx of         No family history of skin cancer     Macular Degeneration No family hx of        Medications:  Current Outpatient Medications   Medication Sig Dispense Refill     ammonium lactate (LAC-HYDRIN) 12 % external cream Apply topically 2 times daily as needed for dry skin  385 g 0     Ascorbic Acid (VITAMIN C) POWD Take 500 mcg by mouth daily  0     aspirin (ASA) 81 MG tablet Take 1 tablet (81 mg) by mouth daily 30 tablet 0     atorvastatin (LIPITOR) 40 MG tablet Take 1 tablet (40 mg) by mouth daily 30 tablet 11     bacitracin 500 UNIT/GM external ointment Apply 30 g topically 2 times daily 30 g 1     blood glucose (ONETOUCH ULTRA) test strip Use to test blood sugars 2 times daily or as directed. 100 strip 11     blood glucose monitoring (ONE TOUCH DELICA) lancets Use to test blood sugars 2 times daily or as directed. 100 each 11     blood glucose monitoring (ONE TOUCH ULTRA MINI) meter device kit Use to test blood sugars 2 times daily or as directed. 1 kit 0     levothyroxine (SYNTHROID/LEVOTHROID) 175 MCG tablet Take 1 tablet (175 mcg) by mouth every morning (before breakfast) 90 tablet 3     metFORMIN (GLUCOPHAGE) 500 MG tablet Take 1 tablet (500 mg) by mouth 2 times daily (with meals) 60 tablet 11     metoprolol succinate ER (TOPROL-XL) 100 MG 24 hr tablet Take 1 tablet (100 mg) by mouth daily 30 tablet 11     multivitamin w/minerals (MULTI-VITAMIN) tablet Take 1 tablet by mouth daily 30 each 0     nystatin (MYCOSTATIN) 226125 UNIT/GM external cream Apply topically 2 times daily as needed for dry skin 30 g 11     omega 3 1000 MG CAPS Take 1 g by mouth daily 30 capsule 0     order for DME Equipment being ordered: Diabetes Shoes 1 Units 0     order for DME Equipment being ordered: Custom diabetic shoes 1 Units 0     order for DME Equipment being ordered: Bariatric Lift chair  Diagnosis - morbid obesity, CHF, Lymphedema    Fax to Ne from [employer] at [phone]. 1 Units 0     order for DME Equipment being ordered: Other: Velcro Compression stockings.   Treatment Diagnosis: bilateral lymphedema. 2 each 0     polyethylene glycol (MIRALAX/GLYCOLAX) packet Take 17 g by mouth daily as needed for constipation 15 packet 0     potassium chloride ER (K-DUR/KLOR-CON M) 20 MEQ CR tablet  Take 2 tablets (40 mEq) by mouth 3 times daily X 1 week , then 2 tablets 2 times daily ongoing. 90 tablet 11     senna-docusate (SENOKOT-S/PERICOLACE) 8.6-50 MG tablet Take 1-2 tablets by mouth 2 times daily as needed for constipation 60 tablet 0     spironolactone (ALDACTONE) 25 MG tablet Take 1 tablet (25 mg) by mouth daily 30 tablet 6     tamsulosin (FLOMAX) 0.4 MG capsule Take 1 capsule (0.4 mg) by mouth daily 30 capsule 0     torsemide (DEMADEX) 100 MG tablet Take 1 tablet (100 mg) by mouth daily Take one tab (100mg) by mouth every morning, take one half tab (50mg) by mouth every evening 30 tablet 11     vitamin B complex with vitamin C (VITAMIN  B COMPLEX) tablet Take 1 tablet by mouth daily 100 tablet 3     vitamin E (VITAMIN E COMPLEX) 1000 units (450 mg) capsule Take 1 capsule (1,000 Units) by mouth daily 100 capsule 3     warfarin (COUMADIN) 5 MG tablet Take 12.5 mg by mouth  1x/week, Take 10 mg by mouth 6x per week 75 tablet 11     naproxen (NAPROSYN) 500 MG tablet Take 1 tablet (500 mg) by mouth 2 times daily (with meals) (Patient not taking: Reported on 5/20/2019) 10 tablet 1       No Known Allergies    Review of Systems:  -Constitutional: The patient denies fatigue, fevers, chills, unintended weight loss, and night sweats.  -HEENT: Patient denies nonhealing oral sores.  -Skin: As above in HPI. No additional skin concerns.  -Heme/Lymph: no concerning bumps, no bleeding or bruising problems     Physical exam:  Vitals: There were no vitals taken for this visit.  GEN: This is a well developed, well-nourished male in no acute distress, in a pleasant mood.    SKIN: Focused examination of the face and scalp was performed.  -On the left forehead there is a 3.5 x 2cm lesion with central depression ulceration and a pearly rolled border. Lesion was biopsied years ago - on 8/20/14 which indicated BCC at the time  -No other lesions of concern on areas examined.     Impression/Plan:  1. BCC/Neoplasm of uncertain  behavior on the left forehead. The differential diagnosis includes nodular/micronodular BCC vs infiltrative BCC vs Other. Lesion was initially biopsied in 2014 and patient was supposed to has MOHS with Dr. Moreno, however patient never followed for he procedure and never treated the lesion. It has since grown, almost doubled in size.     Shave biopsy:  After discussion of benefits and risks including but not limited to bleeding/bruising, pain/swelling, infection, scar, incomplete removal, nerve damage/numbness, recurrence, and non-diagnostic biopsy, written consent, verbal consent and photographs were obtained. Time-out was performed. The area was cleaned with isopropyl alcohol. 0.5ml of 1% lidocaine with 1:100,000 epinephrine was injected to obtain adequate anesthesia. A shave biopsy was performed. Hemostasis was achieved with aluminium chloride. Vaseline and a sterile dressing were applied. The patient tolerated the procedure and no complications were noted. The patient was provided with verbal and written post care instructions.    Photograph was obtained for clinical monitoring and inclusion in medical record.    Will plan for MMS with derm surg once pathology is back    Recommended FBSE, patient defers today    CC Dr. Hastings on close of this encounter.  Follow-up prn for new or changing lesions.        Staff Involved:  Staff/Scribe    Scribe Disclosure:  I, Martin Jordan, am serving as a scribe to document services personally performed by Jennifer Lou PA-C, based on data collection and the provider's statements to me.     Provider Disclosure:   The documentation recorded by the scribe accurately reflects the services I personally performed and the decisions made by me.    All risks, benefits and alternatives were discussed with patient.  Patient is in agreement and understands the assessment and plan.  All questions were answered.    Jennifer Lou PA-C  [facility]  [facility]: Phone: [phone], Fax: [phone]

## 2019-06-12 NOTE — NURSING NOTE
Lidocaine-epinephrine 1-1:356214 % injection   2.5mL once for one use, starting 6/12/2019 ending 6/12/2019,  2mL disp, R-0, injection  Injected by CHUY Neff

## 2019-06-12 NOTE — NURSING NOTE
Dermatology Rooming Note    Clarke Moreira's goals for this visit include:   Chief Complaint   Patient presents with     Skin Check     Clarke is here today to have a spot on his forehead looked at.      CHUY Neff

## 2019-06-13 ENCOUNTER — OFFICE VISIT (OUTPATIENT)
Dept: PODIATRY | Facility: CLINIC | Age: 69
End: 2019-06-13
Payer: COMMERCIAL

## 2019-06-13 VITALS
DIASTOLIC BLOOD PRESSURE: 80 MMHG | HEIGHT: 72 IN | BODY MASS INDEX: 42.66 KG/M2 | WEIGHT: 315 LBS | SYSTOLIC BLOOD PRESSURE: 128 MMHG

## 2019-06-13 DIAGNOSIS — M86.671 OTHER CHRONIC OSTEOMYELITIS OF RIGHT FOOT (H): ICD-10-CM

## 2019-06-13 DIAGNOSIS — I73.9 PAD (PERIPHERAL ARTERY DISEASE) (H): ICD-10-CM

## 2019-06-13 DIAGNOSIS — E11.65 TYPE 2 DIABETES MELLITUS WITH HYPERGLYCEMIA, WITHOUT LONG-TERM CURRENT USE OF INSULIN (H): ICD-10-CM

## 2019-06-13 DIAGNOSIS — E11.621 DIABETIC ULCER OF TOE OF RIGHT FOOT ASSOCIATED WITH TYPE 2 DIABETES MELLITUS, LIMITED TO BREAKDOWN OF SKIN (H): Primary | ICD-10-CM

## 2019-06-13 DIAGNOSIS — G63 POLYNEUROPATHY ASSOCIATED WITH UNDERLYING DISEASE (H): ICD-10-CM

## 2019-06-13 DIAGNOSIS — L97.511 DIABETIC ULCER OF TOE OF RIGHT FOOT ASSOCIATED WITH TYPE 2 DIABETES MELLITUS, LIMITED TO BREAKDOWN OF SKIN (H): Primary | ICD-10-CM

## 2019-06-13 PROCEDURE — 97597 DBRDMT OPN WND 1ST 20 CM/<: CPT | Performed by: PODIATRIST

## 2019-06-13 PROCEDURE — 99213 OFFICE O/P EST LOW 20 MIN: CPT | Mod: 25 | Performed by: PODIATRIST

## 2019-06-13 ASSESSMENT — MIFFLIN-ST. JEOR: SCORE: 2542.09

## 2019-06-13 NOTE — PROGRESS NOTES
ASSESSMENT/PLAN:    Encounter Diagnoses   Name Primary?     Diabetic ulcer of toe of right foot associated with type 2 diabetes mellitus, limited to breakdown of skin (H) Yes     Type 2 diabetes mellitus with hyperglycemia, without long-term current use of insulin (H)      PAD (peripheral artery disease) (H)      Polyneuropathy associated with underlying disease (H)      Although the right hallux appears stable and ulcer superficial, given review of previous X-rays, I suspect chronic osteomyelitis, right hallux, distal phalanx.     He is to continue current wound cares, with home health care assistance and the offloading shoes.      Wound Care Recommendations:    1)  Keep the wound covered by a bandage when bathing.    2)  Gently clean the wound with soap water, separate from bath/shower water.      3)  Each day, apply a topical antibiotic ointment to the wound (Neosporin, Triple antibiotic, Bacitracin).   Cover with large band-aid or gauze.      5)  Please seek immediate medical attention if any increasing redness, drainage, smell, or pain related to the wound.     6)  Please return to clinic in the period of time requested by Dr. Gómez.      I reviewed the x-ray report and images again.  Initially I interpreted as erosive changes at the distal end of the first phalanx, rather than distal phalanx of the first toe.  I called Mr. Moreira this morning, prior to completing this note, and reviewed the x-ray report, my concerns and that the changes do correlate to the site of ulceration.  I highly recommended MRI at this point.  His toe is clinically stable, but I told him that he will likely need hospitalization and surgery.  Although chronic osteomyelitis can be managed as an outpatient, given his multiple co-morbidities, I think inpatient surgery is the best route.  I offered admission today versus await the MRI findings. He opted for the latter which is reasonable given the stable appearance of his toe (no  "cellulitis, malodor, exposed bone, purulence).  He was instructed go to the ED over the weekend, if any concerning changes.     MRI Right foot to evaluate the extent of osteomyelitis.    Excisional Debridement    The excisional debrident procedure discussed.  This included the goals of removing non-viable tissue, evaluating the full extent of wound, and promoting wound healing.  Clarke Moreira  provided verbal and written consent.  The \"Time Out\" was called.     Using a sterile #15 blade and tissue nippers, excisional debridment of the three ulcers was performed, to the level deeper skin.  The hyperkeratotic eschar surrounding the wound was removed, by excising skin edges back to healthy, bleeding tissue .s The ulcer base were scraped to remove bioburden and promote healing.  The area debrided was less than 20 square cm.   There was mild bleeding with the procedure. No anesthesia was needed due to peripheral neuropathy.   A sterile dressing was applied.        Body mass index is 52 kg/m .    Weight management plan: Patient was referred to their PCP to discuss a diet and exercise plan.      Clarke Gómez DPM, FACFAS, MS    [place] [facility]le [facility]      ____________________________________________________________________    HPI:        Chief Complaint: follow up for ulcer or right great toe  Onset of problem: 8 monhts  Pain/ discomfort is described as:  throbbing  Pain Ratin/10 at worst.  Frequency:  daily    The pain is exacerbated by standing and walking.  Previous treatment: Home health care assists with wound cares and wound monitoring every other day.   He is wearing bilateral offloading shoes.  Type 2 DM.      Patient Active Problem List   Diagnosis     Atrial fibrillation (H)     Hypothyroidism     Basal cell carcinoma of skin of face     CTS (carpal tunnel syndrome)     Myopia     Plantar fasciitis     Presbyopia     Regular astigmatism     Neoplasm of uncertain behavior of " skin     Venous stasis     Type 2 diabetes mellitus with hyperglycemia, without long-term current use of insulin (H)     Morbid obesity (H)     Depression with anxiety     Hyperlipidemia LDL goal <100     Fall     BiPAP (biphasic positive airway pressure) dependence     Lymphedema     Chronic systolic congestive heart failure (H)     Essential hypertension with goal blood pressure less than 140/90     Urinary retention     Constipation, unspecified constipation type     Cellulitis     Onychomycosis     (HFpEF) heart failure with preserved ejection fraction (H)     Chronic hypercapnic respiratory failure (H)     Hypoventilation associated with obesity syndrome (H)     Pre-syncope     Elevated serum creatinine     PAD (peripheral artery disease) (H)     THONG (obstructive sleep apnea)     Hypotension     Adequate nutrition     Past Surgical History:   Procedure Laterality Date     BIOPSY OF SKIN LESION       MOHS MICROGRAPHIC PROCEDURE       PICC INSERTION Right 09/24/2017    5fr TL Bard PICC, 51cm (1cm external) in the R medial brachial vein w/ tip in the SVC.     Current Outpatient Medications   Medication Sig Dispense Refill     ammonium lactate (LAC-HYDRIN) 12 % external cream Apply topically 2 times daily as needed for dry skin 385 g 0     Ascorbic Acid (VITAMIN C) POWD Take 500 mcg by mouth daily  0     aspirin (ASA) 81 MG tablet Take 1 tablet (81 mg) by mouth daily 30 tablet 0     atorvastatin (LIPITOR) 40 MG tablet Take 1 tablet (40 mg) by mouth daily 30 tablet 11     bacitracin 500 UNIT/GM external ointment Apply 30 g topically 2 times daily 30 g 1     blood glucose (ONETOUCH ULTRA) test strip Use to test blood sugars 2 times daily or as directed. 100 strip 11     blood glucose monitoring (ONE TOUCH DELICA) lancets Use to test blood sugars 2 times daily or as directed. 100 each 11     blood glucose monitoring (ONE TOUCH ULTRA MINI) meter device kit Use to test blood sugars 2 times daily or as directed. 1 kit 0      levothyroxine (SYNTHROID/LEVOTHROID) 175 MCG tablet Take 1 tablet (175 mcg) by mouth every morning (before breakfast) 90 tablet 3     metFORMIN (GLUCOPHAGE) 500 MG tablet Take 1 tablet (500 mg) by mouth 2 times daily (with meals) 60 tablet 11     metoprolol succinate ER (TOPROL-XL) 100 MG 24 hr tablet Take 1 tablet (100 mg) by mouth daily 30 tablet 11     multivitamin w/minerals (MULTI-VITAMIN) tablet Take 1 tablet by mouth daily 30 each 0     naproxen (NAPROSYN) 500 MG tablet Take 1 tablet (500 mg) by mouth 2 times daily (with meals) (Patient not taking: Reported on 5/20/2019) 10 tablet 1     nystatin (MYCOSTATIN) 558591 UNIT/GM external cream Apply topically 2 times daily as needed for dry skin 30 g 11     omega 3 1000 MG CAPS Take 1 g by mouth daily 30 capsule 0     order for DME Equipment being ordered: Diabetes Shoes 1 Units 0     order for DME Equipment being ordered: Custom diabetic shoes 1 Units 0     order for DME Equipment being ordered: Bariatric Lift chair  Diagnosis - morbid obesity, CHF, Lymphedema    Fax to Ne from [employer] at [phone]. 1 Units 0     order for DME Equipment being ordered: Other: Velcro Compression stockings.   Treatment Diagnosis: bilateral lymphedema. 2 each 0     polyethylene glycol (MIRALAX/GLYCOLAX) packet Take 17 g by mouth daily as needed for constipation 15 packet 0     potassium chloride ER (K-DUR/KLOR-CON M) 20 MEQ CR tablet Take 2 tablets (40 mEq) by mouth 3 times daily X 1 week , then 2 tablets 2 times daily ongoing. 90 tablet 11     senna-docusate (SENOKOT-S/PERICOLACE) 8.6-50 MG tablet Take 1-2 tablets by mouth 2 times daily as needed for constipation 60 tablet 0     spironolactone (ALDACTONE) 25 MG tablet Take 1 tablet (25 mg) by mouth daily 30 tablet 6     tamsulosin (FLOMAX) 0.4 MG capsule Take 1 capsule (0.4 mg) by mouth daily 30 capsule 0     torsemide (DEMADEX) 100 MG tablet Take 1 tablet (100 mg) by mouth daily Take one tab (100mg) by mouth every  "morning, take one half tab (50mg) by mouth every evening 30 tablet 11     vitamin B complex with vitamin C (VITAMIN  B COMPLEX) tablet Take 1 tablet by mouth daily 100 tablet 3     vitamin E (VITAMIN E COMPLEX) 1000 units (450 mg) capsule Take 1 capsule (1,000 Units) by mouth daily 100 capsule 3     warfarin (COUMADIN) 5 MG tablet Take 12.5 mg by mouth  1x/week, Take 10 mg by mouth 6x per week 75 tablet 11       EXAM:    Vitals: /80   Ht 1.829 m (6')   Wt (!) 173.9 kg (383 lb 6.4 oz)   BMI 52.00 kg/m    BMI: Body mass index is 52 kg/m .  Height: 6' 0\"    Vascular:  Pedal pulses are palpable bilaterally for both the DP and PT arteries.  CFT < 3 sec.  No edema.  Pedal hair growth noted.      Neuro:  Alert and oriented x 3. Coordinated gait.  Light touch sensation is diminished.to the L4, L5, S1 distributions. No obvious deficits.  No evidence of neurological-based weakness, spasticity, or contracture in the lower extremities.      Derm:   Right foot:   Thick hyperkeratotic tissue distal right hallux with hyperpigmentation suggestive intra-epidermal bleeding. Deep to this is a 1.5cm ulceration to deeper skin. It did not probe to bone.      Left Foot:   Hyperkeratotic lesion distal left 2nd toe and distal left 3rd toe with pigmentation suggesting intra-epdiermal bleeding. Both toes show ulceration to depth of deeper skin     Musculoskeletal:    Lower extremity muscle strength is normal.  Patient is ambulatory without an assistive device or brace .  Flat feet. Hallux limitus on the right .     Radiographic Exam:    TOE(S) TWO-THREE VIEWS RIGHT  5/16/2019 11:45 AM      HISTORY: Ulcer distal right great toe. Skin ulcer of second toe of  right foot with fat layer exposed (H). Type 2 diabetes mellitus with  hyperglycemia, without long-term current use of insulin (H).     COMPARISON: March 6, 2019.                                                                      IMPRESSION: Continued erosive changes in the distal " aspect of the  first distal phalanx. This is concerning for osteomyelitis. Soft  tissue swelling noted. No acute fracture.      KRYSTAL HARRY MD

## 2019-06-13 NOTE — LETTER
6/13/2019         RE: Clarke Moreira  [address]  [facility] [address]        Dear Colleague,    Thank you for referring your patient, Clarke Moreira, to the [facility]. Please see a copy of my visit note below.    ASSESSMENT/PLAN:    Encounter Diagnoses   Name Primary?     Diabetic ulcer of toe of right foot associated with type 2 diabetes mellitus, limited to breakdown of skin (H) Yes     Type 2 diabetes mellitus with hyperglycemia, without long-term current use of insulin (H)      PAD (peripheral artery disease) (H)      Polyneuropathy associated with underlying disease (H)      Although the right hallux appears stable and ulcer superficial, given review of previous X-rays, I suspect chronic osteomyelitis, right hallux, distal phalanx.     He is to continue current wound cares, with home health care assistance and the offloading shoes.      Wound Care Recommendations:    1)  Keep the wound covered by a bandage when bathing.    2)  Gently clean the wound with soap water, separate from bath/shower water.      3)  Each day, apply a topical antibiotic ointment to the wound (Neosporin, Triple antibiotic, Bacitracin).   Cover with large band-aid or gauze.      5)  Please seek immediate medical attention if any increasing redness, drainage, smell, or pain related to the wound.     6)  Please return to clinic in the period of time requested by Dr. Gómez.      I reviewed the x-ray report and images again.  Initially I interpreted as erosive changes at the distal end of the first phalanx, rather than distal phalanx of the first toe.  I called Mr. Moreira this morning, prior to completing this note, and reviewed the x-ray report, my concerns and that the changes do correlate to the site of ulceration.  I highly recommended MRI at this point.  His toe is clinically stable, but I told him that he will likely need hospitalization and surgery.  Although chronic osteomyelitis can be  "managed as an outpatient, given his multiple co-morbidities, I think inpatient surgery is the best route.  I offered admission today versus await the MRI findings. He opted for the latter which is reasonable given the stable appearance of his toe (no cellulitis, malodor, exposed bone, purulence).  He was instructed go to the ED over the weekend, if any concerning changes.     MRI Right foot to evaluate the extent of osteomyelitis.    Excisional Debridement    The excisional debrident procedure discussed.  This included the goals of removing non-viable tissue, evaluating the full extent of wound, and promoting wound healing.  Clarke RENÉE Moreira  provided verbal and written consent.  The \"Time Out\" was called.     Using a sterile #15 blade and tissue nippers, excisional debridment of the three ulcers was performed, to the level deeper skin.  The hyperkeratotic eschar surrounding the wound was removed, by excising skin edges back to healthy, bleeding tissue .s The ulcer base were scraped to remove bioburden and promote healing.  The area debrided was less than 20 square cm.   There was mild bleeding with the procedure. No anesthesia was needed due to peripheral neuropathy.   A sterile dressing was applied.        Body mass index is 52 kg/m .    Weight management plan: Patient was referred to their PCP to discuss a diet and exercise plan.      Clarke Gómez DPM, FACFAS, MS    [place] [facility] Pod[facility] & Ankle Surgery      ____________________________________________________________________    HPI:        Chief Complaint: follow up for ulcer or right great toe  Onset of problem: 8 monhts  Pain/ discomfort is described as:  throbbing  Pain Ratin/10 at worst.  Frequency:  daily    The pain is exacerbated by standing and walking.  Previous treatment: Home health care assists with wound cares and wound monitoring every other day.   He is wearing bilateral offloading shoes.  Type 2 DM.      Patient Active " Problem List   Diagnosis     Atrial fibrillation (H)     Hypothyroidism     Basal cell carcinoma of skin of face     CTS (carpal tunnel syndrome)     Myopia     Plantar fasciitis     Presbyopia     Regular astigmatism     Neoplasm of uncertain behavior of skin     Venous stasis     Type 2 diabetes mellitus with hyperglycemia, without long-term current use of insulin (H)     Morbid obesity (H)     Depression with anxiety     Hyperlipidemia LDL goal <100     Fall     BiPAP (biphasic positive airway pressure) dependence     Lymphedema     Chronic systolic congestive heart failure (H)     Essential hypertension with goal blood pressure less than 140/90     Urinary retention     Constipation, unspecified constipation type     Cellulitis     Onychomycosis     (HFpEF) heart failure with preserved ejection fraction (H)     Chronic hypercapnic respiratory failure (H)     Hypoventilation associated with obesity syndrome (H)     Pre-syncope     Elevated serum creatinine     PAD (peripheral artery disease) (H)     THONG (obstructive sleep apnea)     Hypotension     Adequate nutrition     Past Surgical History:   Procedure Laterality Date     BIOPSY OF SKIN LESION       MOHS MICROGRAPHIC PROCEDURE       PICC INSERTION Right 09/24/2017    5fr TL Bard PICC, 51cm (1cm external) in the R medial brachial vein w/ tip in the SVC.     Current Outpatient Medications   Medication Sig Dispense Refill     ammonium lactate (LAC-HYDRIN) 12 % external cream Apply topically 2 times daily as needed for dry skin 385 g 0     Ascorbic Acid (VITAMIN C) POWD Take 500 mcg by mouth daily  0     aspirin (ASA) 81 MG tablet Take 1 tablet (81 mg) by mouth daily 30 tablet 0     atorvastatin (LIPITOR) 40 MG tablet Take 1 tablet (40 mg) by mouth daily 30 tablet 11     bacitracin 500 UNIT/GM external ointment Apply 30 g topically 2 times daily 30 g 1     blood glucose (ONETOUCH ULTRA) test strip Use to test blood sugars 2 times daily or as directed. 100 strip 11      blood glucose monitoring (ONE TOUCH DELICA) lancets Use to test blood sugars 2 times daily or as directed. 100 each 11     blood glucose monitoring (ONE TOUCH ULTRA MINI) meter device kit Use to test blood sugars 2 times daily or as directed. 1 kit 0     levothyroxine (SYNTHROID/LEVOTHROID) 175 MCG tablet Take 1 tablet (175 mcg) by mouth every morning (before breakfast) 90 tablet 3     metFORMIN (GLUCOPHAGE) 500 MG tablet Take 1 tablet (500 mg) by mouth 2 times daily (with meals) 60 tablet 11     metoprolol succinate ER (TOPROL-XL) 100 MG 24 hr tablet Take 1 tablet (100 mg) by mouth daily 30 tablet 11     multivitamin w/minerals (MULTI-VITAMIN) tablet Take 1 tablet by mouth daily 30 each 0     naproxen (NAPROSYN) 500 MG tablet Take 1 tablet (500 mg) by mouth 2 times daily (with meals) (Patient not taking: Reported on 5/20/2019) 10 tablet 1     nystatin (MYCOSTATIN) 130362 UNIT/GM external cream Apply topically 2 times daily as needed for dry skin 30 g 11     omega 3 1000 MG CAPS Take 1 g by mouth daily 30 capsule 0     order for DME Equipment being ordered: Diabetes Shoes 1 Units 0     order for DME Equipment being ordered: Custom diabetic shoes 1 Units 0     order for DME Equipment being ordered: Bariatric Lift chair  Diagnosis - morbid obesity, CHF, Lymphedema    Fax to Ne from [employer] at [phone]. 1 Units 0     order for DME Equipment being ordered: Other: Velcro Compression stockings.   Treatment Diagnosis: bilateral lymphedema. 2 each 0     polyethylene glycol (MIRALAX/GLYCOLAX) packet Take 17 g by mouth daily as needed for constipation 15 packet 0     potassium chloride ER (K-DUR/KLOR-CON M) 20 MEQ CR tablet Take 2 tablets (40 mEq) by mouth 3 times daily X 1 week , then 2 tablets 2 times daily ongoing. 90 tablet 11     senna-docusate (SENOKOT-S/PERICOLACE) 8.6-50 MG tablet Take 1-2 tablets by mouth 2 times daily as needed for constipation 60 tablet 0     spironolactone (ALDACTONE) 25 MG tablet  "Take 1 tablet (25 mg) by mouth daily 30 tablet 6     tamsulosin (FLOMAX) 0.4 MG capsule Take 1 capsule (0.4 mg) by mouth daily 30 capsule 0     torsemide (DEMADEX) 100 MG tablet Take 1 tablet (100 mg) by mouth daily Take one tab (100mg) by mouth every morning, take one half tab (50mg) by mouth every evening 30 tablet 11     vitamin B complex with vitamin C (VITAMIN  B COMPLEX) tablet Take 1 tablet by mouth daily 100 tablet 3     vitamin E (VITAMIN E COMPLEX) 1000 units (450 mg) capsule Take 1 capsule (1,000 Units) by mouth daily 100 capsule 3     warfarin (COUMADIN) 5 MG tablet Take 12.5 mg by mouth  1x/week, Take 10 mg by mouth 6x per week 75 tablet 11       EXAM:    Vitals: /80   Ht 1.829 m (6')   Wt (!) 173.9 kg (383 lb 6.4 oz)   BMI 52.00 kg/m     BMI: Body mass index is 52 kg/m .  Height: 6' 0\"    Vascular:  Pedal pulses are palpable bilaterally for both the DP and PT arteries.  CFT < 3 sec.  No edema.  Pedal hair growth noted.      Neuro:  Alert and oriented x 3. Coordinated gait.  Light touch sensation is diminished.to the L4, L5, S1 distributions. No obvious deficits.  No evidence of neurological-based weakness, spasticity, or contracture in the lower extremities.      Derm:   Right foot:   Thick hyperkeratotic tissue distal right hallux with hyperpigmentation suggestive intra-epidermal bleeding. Deep to this is a 1.5cm ulceration to deeper skin. It did not probe to bone.      Left Foot:   Hyperkeratotic lesion distal left 2nd toe and distal left 3rd toe with pigmentation suggesting intra-epdiermal bleeding. Both toes show ulceration to depth of deeper skin     Musculoskeletal:    Lower extremity muscle strength is normal.  Patient is ambulatory without an assistive device or brace .  Flat feet. Hallux limitus on the right .     Radiographic Exam:    TOE(S) TWO-THREE VIEWS RIGHT  5/16/2019 11:45 AM      HISTORY: Ulcer distal right great toe. Skin ulcer of second toe of  right foot with fat layer " exposed (H). Type 2 diabetes mellitus with  hyperglycemia, without long-term current use of insulin (H).     COMPARISON: March 6, 2019.                                                                      IMPRESSION: Continued erosive changes in the distal aspect of the  first distal phalanx. This is concerning for osteomyelitis. Soft  tissue swelling noted. No acute fracture.      KRYSTAL HARRY MD    Again, thank you for allowing me to participate in the care of your patient.        Sincerely,        Clarke Gómez DPM

## 2019-06-14 ENCOUNTER — TELEPHONE (OUTPATIENT)
Dept: FAMILY MEDICINE | Facility: CLINIC | Age: 69
End: 2019-06-14

## 2019-06-14 LAB — COPATH REPORT: NORMAL

## 2019-06-14 NOTE — TELEPHONE ENCOUNTER
INR Result: 2.6    Nurse reported no signs of bleeding or bruising.    Current Coumadin dosing is 10mg for 5 days and 12.5mg for 2 days.    Vitals this morning at 7:30 am were a fasting blood sugar of 181- Patient drank two liters of soda last night also had a procedure at the podiatrist and is still having pain in his foot. The patient stated these are the reasons his blood sugar is elevated.    Blood Pressure 118/75  HR 83    Patient has been eating some salad greens, avacados, green beans, chicken, tomatoes, unprocessed foods, some packaged greens, popcorn, eggs and some onions.    Name of person that should receive call back: Stanley from [facility] at [phone]    Relationship to patient: Nurse    Okay to leave a voicemail: Yes    Is an  needed: No

## 2019-06-14 NOTE — TELEPHONE ENCOUNTER
"  Clinical Pharmacy Note  - Telephone encounter     Home care nurse calls today with a new INR result for Clarke Moreira  PCP:  Matthias Sanchez    Home Care nurse name: Stanley    Current warfarin dose: 10 mg x 5 days and 12.5 mg x 2 days     New concerns: none      INR today in the home:  2.6     Lab Results   Component Value Date    INR 1.7 05/21/2019    INR 2.5 05/10/2019    INR 3.2 04/15/2019    INR 4.07 04/09/2019    INR 2.27 04/03/2019    INR 2.24 04/02/2019         Assessment and Plan:  No change  Follow-up in 2 weeks      Please see \"[facility] Anticoagulation Flowsheet\"       Home care nurse confirms order today - repeats verbally    All medications were reviewed and found to be indicated, effective, safe and convenient unless drug therapy problems identified as described above.    Matthias Waller was provided our recommendations via routed note and  was available for supervision during this visit and is the authorizing prescriber for this visit through the pharmacist collaborative practice agreement.    Tj Palumbo, Pharm.D     "

## 2019-06-17 ENCOUNTER — OFFICE VISIT (OUTPATIENT)
Dept: PSYCHOLOGY | Facility: CLINIC | Age: 69
End: 2019-06-17
Payer: COMMERCIAL

## 2019-06-17 DIAGNOSIS — F41.1 GAD (GENERALIZED ANXIETY DISORDER): ICD-10-CM

## 2019-06-17 DIAGNOSIS — F33.1 MODERATE EPISODE OF RECURRENT MAJOR DEPRESSIVE DISORDER (H): Primary | ICD-10-CM

## 2019-06-17 DIAGNOSIS — D48.5 NEOPLASM OF UNCERTAIN BEHAVIOR OF SKIN: Primary | ICD-10-CM

## 2019-06-17 NOTE — PROGRESS NOTES
Behavioral Health Progress Note    Client Legal Name: Clarke Moreira   Client Preferred Name: Clarke   Service Type: Individual  Length of Visit: 45 minutes  Attendees: patient     Identifying Information and Presenting Problem:    The patient is a 68 year old American male who is being seen for problematic symptoms of depression, ongoing adjustment to medical illness, as well as social isolation.  Mood is significantly worsened today after getting a diagnosis of skin cancer, as well as news that he needs to have an MRI to further evaluate a condition that is happening with his toe.  States he could potentially lose part or all of the toe depending on the findings of the MRI.    Treatment Objective(s) Addressed in This Session:    reports one goal is maintaining healthy eating patterns, feels as if he is returning to old patterns before he went into the hospital and believes this is indicative of his mental disease, doesn't want to do physical activity because then he has to leave his home.    Progress on / Status of Treatment Objective(s) / Homework:  More down and hopeless today.    PHQ-9 SCORE 10/22/2018 12/11/2018 5/10/2019   PHQ-9 Total Score - - -   PHQ-9 Total Score 21 25 22       CHRIS-7 SCORE 10/22/2018 12/11/2018 5/10/2019   Total Score - - -   Total Score 13 20 13     Topics Discussed/Interventions Provided:  Very very down and hopeless today.  Received a call last week that the biopsy of the skin from his face was positive for skin cancer.  Two days later the podiatrist called him to let him know that they need to do a MRI on his toe to see what surgery might be needed.  There is a possibility of losing the entire toe.  Clarke views these events as proof of all the bad choices he has made in his life and does not see a lot of possibility for things to change in a positive direction for himself.  Explored and challenged thoughts he is having about himself.  Discussed likelihood that he will call to  schedule the MRI as he indicated that he may take an avoidant approach.  Stated he might be able to do it if someone could hold his hand the whole time. Discussed that we can figure out a plan together as to how best to meet his needs at that appointment as the MRI machine is so uncomfortable for him this is a significant barrier. Played forward the situation if he did choose not to engage the appointments now for his health and what the consequences might be.  Provided encouragement and hope that he can always make different choices for himself and that he has a team here who is in this with him to support and assist as needed.    Assessment: The patient appeared to be active and engaged in today's session and was receptive to feedback.    Mental Status: Clarke appeared alert and oriented.  He seemed more fatigued today. He was dressed and groomed appropriately.  Eye contact was good.  Speech was normal rate and rhythm.  Mood was described as upset and down.  Affect appeared sad, defeated.  Thought processes were logical and coherent.  Thought content was WNL with no evidence of psychotic or paranoid features. No evidence of SI/HI or self-harm, intent, or plans. Memory appeared grossly intact. Insight and judgment appeared good and patient exhibited good impulse control during the appointment.     Does the patient appear to be at imminent risk of harm to self/others at this time? No    The session was necessary to identify and challenge unhelpful thinking that occurs regarding his perception of himself and reinforced his ability to reframe these thoughts.  Symptoms of depression and anxiety have continued to interfere with his ability to function at home and socially.  Ongoing psychotherapy is necessary to provide counseling and provide support.    Diagnosis (DSM-5):  Major Depression, recurrent, moderate  Generalized Anxiety Disorder  R/o persistent depressive disorder    Plan:  1. Check schedule to see when  Clarke could come in the next two weeks, as next available visit that worked for him was 7/15.    2. Do tx plan at next visit.    NOTE: Treatment plan needed.  Diagnostic assessment update due 10/15/19.

## 2019-06-17 NOTE — Clinical Note
Could you call this patient and offer him an appointment either 6/27 at 11 am or 7/5 at 9 am - this would be an add- on appt.  Thank you!

## 2019-06-18 ENCOUNTER — TELEPHONE (OUTPATIENT)
Dept: FAMILY MEDICINE | Facility: CLINIC | Age: 69
End: 2019-06-18

## 2019-06-18 NOTE — TELEPHONE ENCOUNTER
Returned call to home care nurse, unable to reach. Left VM with verbal orders per protocol as requested. Left callback number for questions.    Kyra Jackson RN

## 2019-06-18 NOTE — TELEPHONE ENCOUNTER
[facility] Family Medicine phone call message - order or referral request for patient:     Order or referral being requested: Skilled nursing visits verbal order. SN 1 x week for 9 weeks and 3 prn.      Additional Comments: Contact prateek from [facility] at [phone]     OK to leave a message on voice mail? Yes    Primary language: English      needed? No    Call taken on June 18, 2019 at 8:24 AM by Bisi Arrieta

## 2019-06-20 ENCOUNTER — MEDICAL CORRESPONDENCE (OUTPATIENT)
Dept: HEALTH INFORMATION MANAGEMENT | Facility: CLINIC | Age: 69
End: 2019-06-20

## 2019-06-24 ENCOUNTER — TELEPHONE (OUTPATIENT)
Dept: PSYCHOLOGY | Facility: CLINIC | Age: 69
End: 2019-06-24

## 2019-06-24 NOTE — TELEPHONE ENCOUNTER
Called  Clarke to see if either add-on appointment times might work.  He had initially declined 2 pm on Thursday 7/2, but then an appt at that time was scheduled, so called to f/u with him.  He decided to take the 7/2 appt just to have it on the books.  Accepted add-on appt on this Thursday at 11 am.  Will send to the  to schedule.

## 2019-06-25 NOTE — ADDENDUM NOTE
Encounter addended by: Charlotte Collado, PT on: 6/25/2019 8:36 AM   Actions taken: Episode resolved, Sign clinical note

## 2019-06-25 NOTE — PROGRESS NOTES
Outpatient Physical Therapy Discharge Note     Patient: Clarke Moreira  : 1950    Beginning/End Dates of Reporting Period:  19 to 2019    Referring Provider: Jesús Olivas MD    Therapy Diagnosis: Lymphedema trunk and LEs     Client Self Report: I am concerned about swelling in the trunk    Objective Measurements:  Objective Measure: waist  Details: 151cm  Objective Measure: Thigh  Details: R 64.5cm, L 66.5cm    Goals:  Goal Identifier Home Program   Goal Description Pt will prevent exacerbation of lymphedema and recognize edema that may be indicative of medical attention needs through daily home management with skin care, exercise and self massage.   Target Date 19   Date Met      Progress:     Goal Identifier Weight   Goal Description PT will have 2kg decreased weight with adherance to lifestyle recommendations.   Target Date 19   Date Met      Progress:     Progress Toward Goals:   Not assessed this period. Pt was pleased with the home program he was issued at evaluation and able to perform. He had scheduled a follow up appt but then cancelled and did not reschedule. PT has not been seen since evaluatio on 19. He will be discharged at this time.    Plan:  Discharge from therapy.    Discharge:    Reason for Discharge: Patient has failed to schedule further appointments.    Equipment Issued: Home Program    Discharge Plan: Patient to continue home program.  Pt may return to clinic with new order if needs persist

## 2019-06-28 ENCOUNTER — TELEPHONE (OUTPATIENT)
Dept: FAMILY MEDICINE | Facility: CLINIC | Age: 69
End: 2019-06-28

## 2019-06-28 ENCOUNTER — DOCUMENTATION ONLY (OUTPATIENT)
Dept: FAMILY MEDICINE | Facility: CLINIC | Age: 69
End: 2019-06-28

## 2019-06-28 NOTE — TELEPHONE ENCOUNTER
[facility] Family Medicine phone call message - order or referral request from patient: [facility] calling in to say that she was supposed to go to patient to check INR however, patient cancelled the appointment for today. She is requesting the order for INR check tomorrow and doze for INR for today. She can be reached at [phone].    Order or referral being requested: Requested order INR and Dose  Additional Details: None    Referral only -Specialty     OK to leave a message on voice mail? Yes    Primary language: English      needed? No    Call taken on June 28, 2019 at 12:45 PM by Monse Christy    Order request route to P [facility] TRIAGE   Referrals Route to [facility] ([facility]) CARE COORDINATOR

## 2019-06-28 NOTE — PROGRESS NOTES
"When opening a documentation only encounter, be sure to enter in \"Chief Complaint\" Forms and in \" Comments\" Title of form, description if needed.    Clarke is a 69 year old  male  Form received via: Fax  Form now resides in: Provider Ready    Yamila Huber MA         .Form has been completed by provider.     Form sent out via: Fax to Carmina Vasquez at Fax Number: [phone]  Patient informed: N/A  Output date: June 28, 2019    Yamila Huber MA                            "

## 2019-07-01 ENCOUNTER — TELEPHONE (OUTPATIENT)
Dept: FAMILY MEDICINE | Facility: CLINIC | Age: 69
End: 2019-07-01

## 2019-07-01 ENCOUNTER — OFFICE VISIT (OUTPATIENT)
Dept: DERMATOLOGY | Facility: CLINIC | Age: 69
End: 2019-07-01
Attending: PHYSICIAN ASSISTANT
Payer: COMMERCIAL

## 2019-07-01 DIAGNOSIS — R33.9 URINARY RETENTION: ICD-10-CM

## 2019-07-01 DIAGNOSIS — C44.319 BASAL CELL CARCINOMA OF LEFT FOREHEAD: Primary | ICD-10-CM

## 2019-07-01 RX ORDER — TAMSULOSIN HYDROCHLORIDE 0.4 MG/1
0.4 CAPSULE ORAL DAILY
Qty: 30 CAPSULE | Refills: 11 | Status: SHIPPED | OUTPATIENT
Start: 2019-07-01 | End: 2020-06-08

## 2019-07-01 ASSESSMENT — PAIN SCALES - GENERAL: PAINLEVEL: NO PAIN (0)

## 2019-07-01 NOTE — TELEPHONE ENCOUNTER
Patient called to inform PharmD team that he was not able to get his INR done on Friday 6/28. Would like to have it done tomorrow 7/2 when he comes in for appointment. Author informed patient that PharmD is not available tomorrow, patient would like to discuss with PharmD. Okay to LVM.    Amanda Dumont, Patient Representative

## 2019-07-01 NOTE — PROGRESS NOTES
[facility] Dermatology Note    Dermatology Surgery Clinic  [facility]  Clinics and Surgery Center  [address] [place] [id number]    Dermatology Problem List:  1. History of BCC on left forehead which was not treated - 08/2014; MMS date 07/09/2019   -patient never made follow up appointment for Mohs with Dr. Moreno in 2014. Re-biopsy today 6/12/19 - will plan on MMS once path is back  2. Stasis Dermatitis    Encounter Date: Jul 1, 2019    CC:  Chief Complaint   Patient presents with     Derm Problem     L forehead BCC     History of Present Illness:  Mr. Clarke Moreira is a 69 year old male who presents today for a Mohs consultation regarding a basal cell carcinoma of the left forehead. This lesion was re-biopsied on 06/12/2019 during his visit with Jennifer Lou PA-C. Today the pt reports having this basal cell for some time and was initially diagnosed in 2014 with Dr. Moreno.  No Hx of smoking. Hx of BCC skin cancer; a BCC of the right forehead was removed in 2010. He is currently on as Warfrin and Asprin regimen. The patient is otherwise feeling well. There are no other skin concerns at this time.    Past Medical History:   Patient Active Problem List   Diagnosis     Atrial fibrillation (H)     Hypothyroidism     Basal cell carcinoma of skin of face     CTS (carpal tunnel syndrome)     Myopia     Plantar fasciitis     Presbyopia     Regular astigmatism     Neoplasm of uncertain behavior of skin     Venous stasis     Type 2 diabetes mellitus with hyperglycemia, without long-term current use of insulin (H)     Morbid obesity (H)     Depression with anxiety     Hyperlipidemia LDL goal <100     Fall     BiPAP (biphasic positive airway pressure) dependence     Lymphedema     Chronic systolic congestive heart failure (H)     Essential hypertension with goal blood pressure less than 140/90     Urinary retention     Constipation, unspecified constipation type     Cellulitis      Onychomycosis     (HFpEF) heart failure with preserved ejection fraction (H)     Chronic hypercapnic respiratory failure (H)     Hypoventilation associated with obesity syndrome (H)     Pre-syncope     Elevated serum creatinine     PAD (peripheral artery disease) (H)     THONG (obstructive sleep apnea)     Hypotension     Adequate nutrition     Past Medical History:   Diagnosis Date     Atrial fibrillation (H)      Basal cell carcinoma      Chronic anticoagulation      Diastolic heart failure (H)      Dyslipidemia      Essential hypertension      Morbid obesity (H)      Sleep-disordered breathing      Type 2 diabetes mellitus (H)      Past Surgical History:   Procedure Laterality Date     BIOPSY OF SKIN LESION       MOHS MICROGRAPHIC PROCEDURE       PICC INSERTION Right 09/24/2017    5fr TL Bard PICC, 51cm (1cm external) in the R medial brachial vein w/ tip in the SVC.       Social History:  Social History     Socioeconomic History     Marital status: Single     Spouse name: None     Number of children: None     Years of education: None     Highest education level: None   Occupational History     None   Social Needs     Financial resource strain: None     Food insecurity:     Worry: None     Inability: None     Transportation needs:     Medical: None     Non-medical: None   Tobacco Use     Smoking status: Never Smoker     Smokeless tobacco: Never Used   Substance and Sexual Activity     Alcohol use: No     Comment: Former alcohol abuse. Quit 70s then quit later in 80s-present     Drug use: No     Comment: history of LSD use     Sexual activity: None   Lifestyle     Physical activity:     Days per week: None     Minutes per session: None     Stress: None   Relationships     Social connections:     Talks on phone: None     Gets together: None     Attends Orthodoxy service: None     Active member of club or organization: None     Attends meetings of clubs or organizations: None     Relationship status: None      Intimate partner violence:     Fear of current or ex partner: None     Emotionally abused: None     Physically abused: None     Forced sexual activity: None   Other Topics Concern     Parent/sibling w/ CABG, MI or angioplasty before 65F 55M? Not Asked   Social History Narrative    Lives in an apartment in 16th floor near hospital.  Has elevators, unable to use stairs. Not able to shop; has things delivered to him including food. Difficulty completing cleaning. Goes to PCP once yearly for annual check up and medication review.       Family History:  Family History   Problem Relation Age of Onset     Depression Mother      Glaucoma Maternal Grandfather      Cancer No family hx of         No family history of skin cancer     Macular Degeneration No family hx of         Medications:  Current Outpatient Medications   Medication Sig Dispense Refill     ammonium lactate (LAC-HYDRIN) 12 % external cream Apply topically 2 times daily as needed for dry skin 385 g 0     Ascorbic Acid (VITAMIN C) POWD Take 500 mcg by mouth daily  0     aspirin (ASA) 81 MG tablet Take 1 tablet (81 mg) by mouth daily 30 tablet 0     atorvastatin (LIPITOR) 40 MG tablet Take 1 tablet (40 mg) by mouth daily 30 tablet 11     bacitracin 500 UNIT/GM external ointment Apply 30 g topically 2 times daily 30 g 1     blood glucose (ONETOUCH ULTRA) test strip Use to test blood sugars 2 times daily or as directed. 100 strip 11     blood glucose monitoring (ONE TOUCH DELICA) lancets Use to test blood sugars 2 times daily or as directed. 100 each 11     blood glucose monitoring (ONE TOUCH ULTRA MINI) meter device kit Use to test blood sugars 2 times daily or as directed. 1 kit 0     levothyroxine (SYNTHROID/LEVOTHROID) 175 MCG tablet Take 1 tablet (175 mcg) by mouth every morning (before breakfast) 90 tablet 3     metFORMIN (GLUCOPHAGE) 500 MG tablet Take 1 tablet (500 mg) by mouth 2 times daily (with meals) 60 tablet 11     metoprolol succinate ER (TOPROL-XL)  100 MG 24 hr tablet Take 1 tablet (100 mg) by mouth daily 30 tablet 11     multivitamin w/minerals (MULTI-VITAMIN) tablet Take 1 tablet by mouth daily 30 each 0     naproxen (NAPROSYN) 500 MG tablet Take 1 tablet (500 mg) by mouth 2 times daily (with meals) 10 tablet 1     nystatin (MYCOSTATIN) 972276 UNIT/GM external cream Apply topically 2 times daily as needed for dry skin 30 g 11     omega 3 1000 MG CAPS Take 1 g by mouth daily 30 capsule 0     order for DME Equipment being ordered: Diabetes Shoes 1 Units 0     order for DME Equipment being ordered: Custom diabetic shoes 1 Units 0     order for DME Equipment being ordered: Bariatric Lift chair  Diagnosis - morbid obesity, CHF, Lymphedema    Fax to Ne from [employer] at [phone]. 1 Units 0     order for DME Equipment being ordered: Other: Velcro Compression stockings.   Treatment Diagnosis: bilateral lymphedema. 2 each 0     polyethylene glycol (MIRALAX/GLYCOLAX) packet Take 17 g by mouth daily as needed for constipation 15 packet 0     potassium chloride ER (K-DUR/KLOR-CON M) 20 MEQ CR tablet Take 2 tablets (40 mEq) by mouth 3 times daily X 1 week , then 2 tablets 2 times daily ongoing. 90 tablet 11     senna-docusate (SENOKOT-S/PERICOLACE) 8.6-50 MG tablet Take 1-2 tablets by mouth 2 times daily as needed for constipation 60 tablet 0     spironolactone (ALDACTONE) 25 MG tablet Take 1 tablet (25 mg) by mouth daily 30 tablet 6     tamsulosin (FLOMAX) 0.4 MG capsule Take 1 capsule (0.4 mg) by mouth daily 30 capsule 0     torsemide (DEMADEX) 100 MG tablet Take 1 tablet (100 mg) by mouth daily Take one tab (100mg) by mouth every morning, take one half tab (50mg) by mouth every evening 30 tablet 11     vitamin B complex with vitamin C (VITAMIN  B COMPLEX) tablet Take 1 tablet by mouth daily 100 tablet 3     vitamin E (VITAMIN E COMPLEX) 1000 units (450 mg) capsule Take 1 capsule (1,000 Units) by mouth daily 100 capsule 3     warfarin (COUMADIN) 5 MG tablet  Take 12.5 mg by mouth  1x/week, Take 10 mg by mouth 6x per week 75 tablet 11       No Known Allergies    Review of Systems:  -As per HPI  -Otherwise nml state of health. No other skin concerns.    Physical exam:  Vitals: There were no vitals taken for this visit.  GEN: This is a well developed, well-nourished male in no acute distress, in a pleasant mood.    SKIN: Focused examination of the face was performed.  - L central forehead, 1.3x1.9cm ulcerated plaque corresponding to the biopsy site.  - No other lesions of concern on areas examined.     Impression/Plan:  1. Basal cell carcinoma of the L forehead. Lesion was initially biopsied in 2014 and patient was supposed to has MOHS with Dr. Moreno, however patient never followed for he procedure and never treated the lesion.    Reviewed pathology report and nature of diagnosis.     Risks, benefits, and process of Mohs micrographic surgery were discussed including possibility of damage to surrounding anatomical structures and infection. Patient is comfortable proceeding with the surgery; consent form was obtained. He will be scheduled at his convenience.    No indication for pre-op antibiotics    Pre-op written instructions provided.       Follow-up as scheduled for MMS.       Staff Involved:    Scribe Disclosure  I, Ryan Patel, am serving as a scribe to document services personally performed by Dr. Pieter Wilkinson, based on data collection and the provider's statements to me.       Attending Attestation  I attest that the Scribe recorded the interview and exam that I personally performed.  I have reviewed the note and edited it as necessary.    Pieter Wilkinson M.D.  Professor  Director of Dermatologic Surgery  Department of Dermatology  [facility]

## 2019-07-01 NOTE — NURSING NOTE
Chief Complaint   Patient presents with     Derm Problem     L forehead BCC   Oma Marmolejo, EMT

## 2019-07-01 NOTE — PATIENT INSTRUCTIONS
Mohs Cutaneous Micrographic Surgery Unit  Pieter Wilkinson MD      Pre-Surgical Instruction Sheet    1. Expect to be here majority of the morning.  The Mohs surgical procedure can be an extensive surgery requiring multiple stages. Each stage may take 1-2 hours.    ? You may eat breakfast  ? You may bring snacks or beverages with you  ? Take all normal medications morning of surgery -- unless otherwise indicated by your physician  ? If you have an artificial heart valve, or joint replacement within two years, we will prescribe an antibiotic. Please take one hour prior to surgery.    2. You will need a  to be with you if your surgical site is close to your eyes. You can get swelling/bruising immediately after surgery and will go home with a big bulky bandage that could obstruct your view of driving safely.    3. Wear comfortable clothing -- preferably a button down shirt or a loose fitted shirt. (to avoid pulling over pressure bandage off when you get home) If your surgery site is on your leg, try wearing loose fitted pants. If your surgery site is on your arm, try wearing a short-sleeve shirt.    4. Bring reading material or other items to help pass time. We do have internet access available if you own a laptop, iPad, etc.)    5. Women - do NOT wear make-up. We will be washing it off anyone needing surgery on the face    6. Do NOT stop blood thinning medication unless instructed to do so by your surgeon. This will include: Warfarin, Coumadin, Jantoven, Aspirin, Plavix and Pradaxa. If you are taking Warfarin or Coumadin, please have your INR blood test results sent to our office no more than 7 days prior to your surgery.     7. Tylenol is a good alternative to take for headaches and pain, and can be taken throughout your surgery and postoperative recovery.    8. If your surgery is on your trunk, arms, hands, legs, feet, fingernail or a toenail, please wash the area with Hibiclens, an over-the-counter antiseptic soap,  the night before and morning of your surgery.     If you have any questions, please feel free to contact us.    Phone numbers:  During business hours (M-F 8:00-4:30 p.m.)  [facility]:  [phone] - select option 1 for appt. desk  [phone] - select option 3 for nurse triage line  [facility]:   [phone] - goes straight to call center   ---------------------------------------------------------  Evenings/Weekends/Holidays  Hospital - [phone]   TTY for hearing mousqkwb-[phone]  *Ask  to page dermatologist on-call  Emergency Jqcy-[phone]  TTY for hearing impaired- [phone]              Preventive Care:    Diabetic Eye Exam Screening: During our visit today, we discussed that it is recommended you receive diabetic eye exam screening. Please call or make an appointment with your primary care provider to discuss this with them. You may also call the [facility] scheduling line ([phone]) to set up an eye exam at one of the [facility].

## 2019-07-01 NOTE — TELEPHONE ENCOUNTER
"Request for medication refill: Flomax 0.4 mg caps    Providers if patient needs an appointment and you are willing to give a one month supply please refill for one month and  send a letter/MyChart using \".SMILLIMITEDREFILL\" .smillimited and route chart to \"P SMI \" (Giving one month refill in non controlled medications is strongly recommended before denial)    If refill has been denied, meaning absolutely no refills without visit, please complete the smart phrase \".smirxrefuse\" and route it to the \"P SMI MED REFILLS\"  pool to inform the patient and the pharmacy.    Yamila Huber MA        "

## 2019-07-01 NOTE — LETTER
7/1/2019       RE: Clarke Moreira  [address]  [facility] [address]     Dear Colleague,    Thank you for referring your patient, Clarke Moreira, to the [facility] DERMATOLOGIC SURGERY at [facility]. Please see a copy of my visit note below.    [facility] Dermatology Note    Dermatology Surgery Clinic  [facility]  Clinics and Surgery Center  [address], [place] [address]    Dermatology Problem List:  1. History of BCC on left forehead which was not treated - 08/2014; MMS date 07/09/2019   -patient never made follow up appointment for Mohs with Dr. Moreno in 2014. Re-biopsy today 6/12/19 - will plan on MMS once path is back  2. Stasis Dermatitis    Encounter Date: Jul 1, 2019    CC:  Chief Complaint   Patient presents with     Derm Problem     L forehead BCC     History of Present Illness:  Mr. Calrke Moreira is a 69 year old male who presents today for a Mohs consultation regarding a basal cell carcinoma of the left forehead. This lesion was re-biopsied on 06/12/2019 during his visit with Jennifer Lou PA-C. Today the pt reports having this basal cell for some time and was initially diagnosed in 2014 with Dr. Moreno.  No Hx of smoking. Hx of BCC skin cancer; a BCC of the right forehead was removed in 2010. He is currently on as Warfrin and Asprin regimen. The patient is otherwise feeling well. There are no other skin concerns at this time.    Past Medical History:   Patient Active Problem List   Diagnosis     Atrial fibrillation (H)     Hypothyroidism     Basal cell carcinoma of skin of face     CTS (carpal tunnel syndrome)     Myopia     Plantar fasciitis     Presbyopia     Regular astigmatism     Neoplasm of uncertain behavior of skin     Venous stasis     Type 2 diabetes mellitus with hyperglycemia, without long-term current use of insulin (H)     Morbid obesity (H)     Depression with anxiety      Hyperlipidemia LDL goal <100     Fall     BiPAP (biphasic positive airway pressure) dependence     Lymphedema     Chronic systolic congestive heart failure (H)     Essential hypertension with goal blood pressure less than 140/90     Urinary retention     Constipation, unspecified constipation type     Cellulitis     Onychomycosis     (HFpEF) heart failure with preserved ejection fraction (H)     Chronic hypercapnic respiratory failure (H)     Hypoventilation associated with obesity syndrome (H)     Pre-syncope     Elevated serum creatinine     PAD (peripheral artery disease) (H)     THONG (obstructive sleep apnea)     Hypotension     Adequate nutrition     Past Medical History:   Diagnosis Date     Atrial fibrillation (H)      Basal cell carcinoma      Chronic anticoagulation      Diastolic heart failure (H)      Dyslipidemia      Essential hypertension      Morbid obesity (H)      Sleep-disordered breathing      Type 2 diabetes mellitus (H)      Past Surgical History:   Procedure Laterality Date     BIOPSY OF SKIN LESION       MOHS MICROGRAPHIC PROCEDURE       PICC INSERTION Right 09/24/2017    5fr TL Bard PICC, 51cm (1cm external) in the R medial brachial vein w/ tip in the SVC.       Social History:  Social History     Socioeconomic History     Marital status: Single     Spouse name: None     Number of children: None     Years of education: None     Highest education level: None   Occupational History     None   Social Needs     Financial resource strain: None     Food insecurity:     Worry: None     Inability: None     Transportation needs:     Medical: None     Non-medical: None   Tobacco Use     Smoking status: Never Smoker     Smokeless tobacco: Never Used   Substance and Sexual Activity     Alcohol use: No     Comment: Former alcohol abuse. Quit 70s then quit later in 80s-present     Drug use: No     Comment: history of LSD use     Sexual activity: None   Lifestyle     Physical activity:     Days per week:  None     Minutes per session: None     Stress: None   Relationships     Social connections:     Talks on phone: None     Gets together: None     Attends Tenriism service: None     Active member of club or organization: None     Attends meetings of clubs or organizations: None     Relationship status: None     Intimate partner violence:     Fear of current or ex partner: None     Emotionally abused: None     Physically abused: None     Forced sexual activity: None   Other Topics Concern     Parent/sibling w/ CABG, MI or angioplasty before 65F 55M? Not Asked   Social History Narrative    Lives in an apartment in 16th floor near hospital.  Has elevators, unable to use stairs. Not able to shop; has things delivered to him including food. Difficulty completing cleaning. Goes to PCP once yearly for annual check up and medication review.       Family History:  Family History   Problem Relation Age of Onset     Depression Mother      Glaucoma Maternal Grandfather      Cancer No family hx of         No family history of skin cancer     Macular Degeneration No family hx of         Medications:  Current Outpatient Medications   Medication Sig Dispense Refill     ammonium lactate (LAC-HYDRIN) 12 % external cream Apply topically 2 times daily as needed for dry skin 385 g 0     Ascorbic Acid (VITAMIN C) POWD Take 500 mcg by mouth daily  0     aspirin (ASA) 81 MG tablet Take 1 tablet (81 mg) by mouth daily 30 tablet 0     atorvastatin (LIPITOR) 40 MG tablet Take 1 tablet (40 mg) by mouth daily 30 tablet 11     bacitracin 500 UNIT/GM external ointment Apply 30 g topically 2 times daily 30 g 1     blood glucose (ONETOUCH ULTRA) test strip Use to test blood sugars 2 times daily or as directed. 100 strip 11     blood glucose monitoring (ONE TOUCH DELICA) lancets Use to test blood sugars 2 times daily or as directed. 100 each 11     blood glucose monitoring (ONE TOUCH ULTRA MINI) meter device kit Use to test blood sugars 2 times daily  or as directed. 1 kit 0     levothyroxine (SYNTHROID/LEVOTHROID) 175 MCG tablet Take 1 tablet (175 mcg) by mouth every morning (before breakfast) 90 tablet 3     metFORMIN (GLUCOPHAGE) 500 MG tablet Take 1 tablet (500 mg) by mouth 2 times daily (with meals) 60 tablet 11     metoprolol succinate ER (TOPROL-XL) 100 MG 24 hr tablet Take 1 tablet (100 mg) by mouth daily 30 tablet 11     multivitamin w/minerals (MULTI-VITAMIN) tablet Take 1 tablet by mouth daily 30 each 0     naproxen (NAPROSYN) 500 MG tablet Take 1 tablet (500 mg) by mouth 2 times daily (with meals) 10 tablet 1     nystatin (MYCOSTATIN) 241163 UNIT/GM external cream Apply topically 2 times daily as needed for dry skin 30 g 11     omega 3 1000 MG CAPS Take 1 g by mouth daily 30 capsule 0     order for DME Equipment being ordered: Diabetes Shoes 1 Units 0     order for DME Equipment being ordered: Custom diabetic shoes 1 Units 0     order for DME Equipment being ordered: Bariatric Lift chair  Diagnosis - morbid obesity, CHF, Lymphedema    Fax to Ne from [employer] at [phone]. 1 Units 0     order for DME Equipment being ordered: Other: Velcro Compression stockings.   Treatment Diagnosis: bilateral lymphedema. 2 each 0     polyethylene glycol (MIRALAX/GLYCOLAX) packet Take 17 g by mouth daily as needed for constipation 15 packet 0     potassium chloride ER (K-DUR/KLOR-CON M) 20 MEQ CR tablet Take 2 tablets (40 mEq) by mouth 3 times daily X 1 week , then 2 tablets 2 times daily ongoing. 90 tablet 11     senna-docusate (SENOKOT-S/PERICOLACE) 8.6-50 MG tablet Take 1-2 tablets by mouth 2 times daily as needed for constipation 60 tablet 0     spironolactone (ALDACTONE) 25 MG tablet Take 1 tablet (25 mg) by mouth daily 30 tablet 6     tamsulosin (FLOMAX) 0.4 MG capsule Take 1 capsule (0.4 mg) by mouth daily 30 capsule 0     torsemide (DEMADEX) 100 MG tablet Take 1 tablet (100 mg) by mouth daily Take one tab (100mg) by mouth every morning, take one half  tab (50mg) by mouth every evening 30 tablet 11     vitamin B complex with vitamin C (VITAMIN  B COMPLEX) tablet Take 1 tablet by mouth daily 100 tablet 3     vitamin E (VITAMIN E COMPLEX) 1000 units (450 mg) capsule Take 1 capsule (1,000 Units) by mouth daily 100 capsule 3     warfarin (COUMADIN) 5 MG tablet Take 12.5 mg by mouth  1x/week, Take 10 mg by mouth 6x per week 75 tablet 11       No Known Allergies    Review of Systems:  -As per HPI  -Otherwise nml state of health. No other skin concerns.    Physical exam:  Vitals: There were no vitals taken for this visit.  GEN: This is a well developed, well-nourished male in no acute distress, in a pleasant mood.    SKIN: Focused examination of the face was performed.  - L central forehead, 1.3x1.9cm ulcerated plaque corresponding to the biopsy site.  - No other lesions of concern on areas examined.     Impression/Plan:  1. Basal cell carcinoma of the L forehead. Lesion was initially biopsied in 2014 and patient was supposed to has MOHS with Dr. Moreno, however patient never followed for he procedure and never treated the lesion.    Reviewed pathology report and nature of diagnosis.     Risks, benefits, and process of Mohs micrographic surgery were discussed including possibility of damage to surrounding anatomical structures and infection. Patient is comfortable proceeding with the surgery; consent form was obtained. He will be scheduled at his convenience.    No indication for pre-op antibiotics    Pre-op written instructions provided.       Follow-up as scheduled for MMS.     Staff Involved:    Scribe Disclosure  I, Ryan Patel, am serving as a scribe to document services personally performed by Dr. Pieter Wilkinson, based on data collection and the provider's statements to me.     Attending Attestation  I attest that the Scribe recorded the interview and exam that I personally performed.  I have reviewed the note and edited it as necessary.    Pieter Wilkinson  M.D.  Professor  Director of Dermatologic Surgery  Department of Dermatology  [facility]

## 2019-07-02 ENCOUNTER — OFFICE VISIT (OUTPATIENT)
Dept: PSYCHOLOGY | Facility: CLINIC | Age: 69
End: 2019-07-02
Payer: COMMERCIAL

## 2019-07-02 ENCOUNTER — TELEPHONE (OUTPATIENT)
Dept: FAMILY MEDICINE | Facility: CLINIC | Age: 69
End: 2019-07-02

## 2019-07-02 DIAGNOSIS — F33.1 MODERATE EPISODE OF RECURRENT MAJOR DEPRESSIVE DISORDER (H): Primary | ICD-10-CM

## 2019-07-02 DIAGNOSIS — F41.1 GAD (GENERALIZED ANXIETY DISORDER): ICD-10-CM

## 2019-07-02 DIAGNOSIS — I48.20 CHRONIC ATRIAL FIBRILLATION (H): ICD-10-CM

## 2019-07-02 LAB — INR PPP: 2.6

## 2019-07-02 NOTE — PROGRESS NOTES
"Behavioral Health Progress Note    Client Legal Name: Clarke Moreira   Client Preferred Name: Clarke   Service Type: Individual  Length of Visit: 55 minutes  Attendees: patient     Identifying Information and Presenting Problem:    The patient is a 68 year old American male who is being seen for problematic symptoms of depression, ongoing adjustment to medical illness, as well as social isolation.  Clarke has continued to struggle more with his mood with the recent developments with his health.      Treatment Objective(s) Addressed in This Session:    reports one goal is maintaining healthy eating patterns, feels as if he is returning to old patterns before he went into the hospital and believes this is indicative of his mental disease, doesn't want to do physical activity because then he has to leave his home.    Progress on / Status of Treatment Objective(s) / Homework:  Feeling down and anxious about health     PHQ-9 SCORE 10/22/2018 12/11/2018 5/10/2019   PHQ-9 Total Score - - -   PHQ-9 Total Score 21 25 22       CHRIS-7 SCORE 10/22/2018 12/11/2018 5/10/2019   Total Score - - -   Total Score 13 20 13     Topics Discussed/Interventions Provided:  Clarke states he had a good experience with [facility] today.  Has been somewhat better.  Even when there have been difficulties feels like they have been more open about what they can/can't do which has helped Clarke to experience less negative emotion when things don't go as well.  Does not like being so dependent on others to navigate his life.      Clarke states overall he has been miserable.  He states \"I need some acceptance that I am in decline.\"  Very frustrated with the situation with his toe and worried about hte possibility of amputation.  Looking at his toe today it appeared to be draining fluid through his wraps and socks. Asked if we could have a nurse take a look to see if he should be seen, but he declined.  I asked if a nurse could call him " tomorrow to check-in and he agreed to that plan.      Session focused on Ambika's thoughts that the he needs to be more accepting about his circumstances.  Validated this to a certain extent, but also explored how even with declining health, it does not mean that he can not continue to make changes with how he engages in the world.  Ambika sees himself as unwanted by others and often assumes he can not speak up or advocate for himself.  He discussed the example of the paperwork with his building and how he was slow in responding to the situation because he was worried he had done something wrong.  This exacerbated the situation.  He realized he needed to be more assertive and actually made an appt to speak to the manger directly.  This is coming up in about a week.  Provided positive reinforcement for the changes he made and highlighted the gains made to provide hope that he can make the changes he would like to make.  Ambika observes that right now approaching situations/people in the way he would is more of an aberration than the pattern of his life.  He would like this to be more the pattern rather than the abberation.    Discussed goals for therapy:  Not be as afriad and I want to be more forgiving  Try to address these circumstances when they come up in a more engaging fashion - that I make the effort  satisifed in my daily life about being satisifed with my life  Did not write out tp today - will at net visit.    Ambika was wondering about how to handle a strange situation that occurred this past week where the grandson of an old friend that Ambika has not seen in many many years called him to tell him that his grandfather was dying and that his grandfather wanted Ambika to know that he forgave him.  Ambika is unsure what he is being forgiven for. The grandson was very upset with Ambika wanting to know what Ambika did to his grandfather.  Ambika was trying to decide whether or not to call the  grandson back to defend himself.      Assessment: The patient appeared to be active and engaged in today's session and was receptive to feedback.    Mental Status: Clarke appeared alert and oriented. He was casually and neatly dressed and groomed appropriately.  Eye contact was good.  Speech was normal rate and rhythm.  Mood was described as frustrated.  Affect appeared down.  Thought processes were logical and coherent.  Thought content was WNL with no evidence of psychotic or paranoid features. No evidence of SI/HI or self-harm, intent, or plans. Memory appeared grossly intact. Insight and judgment appeared good and patient exhibited good impulse control during the appointment.     Does the patient appear to be at imminent risk of harm to self/others at this time? No    The session was necessary to identify and challenge unhelpful thinking that occurs regarding his perception of himself and reinforced his ability to reframe these thoughts.  Additionally, we discussed his goals for treatment and will complete the treatment plan at our next visit.  Symptoms of depression and anxiety have continued to interfere with his ability to function at home and socially.  Ongoing psychotherapy is necessary to provide counseling and provide support.    Diagnosis (DSM-5):  Major Depression, recurrent, moderate  Generalized Anxiety Disorder  R/o persistent depressive disorder    Plan:  1. Return in two weeks.  2.  Clarke will call to make the MRI appt  3. Fill out tx plan at next visit.  4. Spoke to Bladimir (clinic rn) after our visit and he will call Clarke tomorrow.    NOTE: Treatment plan needed.  Diagnostic assessment update due 10/15/19.

## 2019-07-02 NOTE — TELEPHONE ENCOUNTER
"Spoke with Porter in regards to patients being in clinic and declining assistance with dressing on\"his toes\", pt has hx for type 2 diabetes and \" Skin ulcer of second toe of right foot with fat layer exposed\". Per provider, \"had wet dressing on his toes\" will continue to follow up, per patient/provider's request, message routed to PCP as Lindsey.    Bladimir Campbell RN    "

## 2019-07-03 ENCOUNTER — DOCUMENTATION ONLY (OUTPATIENT)
Dept: FAMILY MEDICINE | Facility: CLINIC | Age: 69
End: 2019-07-03

## 2019-07-03 ENCOUNTER — TELEPHONE (OUTPATIENT)
Dept: FAMILY MEDICINE | Facility: CLINIC | Age: 69
End: 2019-07-03

## 2019-07-03 DIAGNOSIS — I50.32 CHRONIC HEART FAILURE WITH PRESERVED EJECTION FRACTION (H): Primary | ICD-10-CM

## 2019-07-03 NOTE — TELEPHONE ENCOUNTER
"Called patient to follow up on his toes, pt reported that \"he cleaned\" and scheduled to see home care nurse tomorrow 0800, requested to have 30 day supply for his Potasium ER, reports to be taking 4 tablets per day.    Rn notified that message will be routed to PCP to address if appropriate.    Bladimir Campbell RN    "

## 2019-07-03 NOTE — PROGRESS NOTES
"When opening a documentation only encounter, be sure to enter in \"Chief Complaint\" Forms and in \" Comments\" Title of form, description if needed.    Clarke is a 69 year old  male  Form received via: Fax  Form now resides in: Provider Ready    Yamila Huber MA       Form has been completed by provider.     Form sent out via: Fax to Carmina Vasquez @ [facility] at Fax Number: [phone]  Patient informed: N/A  Output date: July 3, 2019    Yamila Huber MA                            "

## 2019-07-03 NOTE — TELEPHONE ENCOUNTER
[facility] phone call message- medication clarification/question:    Full Medication Name: potassium chloride ER (K-DUR/KLOR-CON M) 20 MEQ CR tablet   Dose: then 2 tablets 2 times daily     Question/Clarification needed: Patient has been receiving this RX with a quantity of 90 tablets for the last year which leaves the patient without medication for one week a month. This patient is taking 4 tablets a day so needs a quantity of 120 tablets each month. Please advise on how to fix this issue.   Please call patient when issue is resolved.     Pharmacy confirmed as   [id number] - [place], [place] - [address]  [address]  [facility] [address]  Phone: [phone] Fax: [phone]  : Yes    Please leave ONLY preferred pharmacy    OK to leave a message on voice mail? Yes-Please call patient when issue is resolved.     Advised patient that RN would call back within 3 hours, unless emergent.    Primary language: English      needed? No    Call taken on July 3, 2019 at 1:10 PM by Bisi Manzo to [facility]

## 2019-07-05 ENCOUNTER — TELEPHONE (OUTPATIENT)
Dept: ORTHOPEDICS | Facility: CLINIC | Age: 69
End: 2019-07-05

## 2019-07-05 ENCOUNTER — TELEPHONE (OUTPATIENT)
Dept: FAMILY MEDICINE | Facility: CLINIC | Age: 69
End: 2019-07-05

## 2019-07-05 NOTE — TELEPHONE ENCOUNTER
Stanley with [facility] called to clarify when they should be doing the patient's INR again. Please call back to discuss, okay to LVM.    Amanda Dumont, Patient Representative

## 2019-07-05 NOTE — TELEPHONE ENCOUNTER
RN called and spoke with patient. Patient denies any fever, pain only when he bears weight. No more pus at this point. RN advised patient to keep an eye on, make sure it is well covered. If patient's swelling increases and the pain becomes intractable, patient is to go urgent care to get treatment and relief of pain.    RN notifiy Whitley and Dr. Lagos.    Joyce Mascorro RN

## 2019-07-05 NOTE — TELEPHONE ENCOUNTER
PHARMACY TELEPHONE ENCOUNTER:    Reason: INR results      I have called Clarke and discussed the INR result.  INR is withn normal INR range.   Clarke continues to take the same warfarin dose  12.5 mg x 2 days and 10 mg x 5 days    I have called and left VO for homecare nurse.      Tj Palumbo PharmBradfordD.

## 2019-07-05 NOTE — TELEPHONE ENCOUNTER
"[facility] Call Center    Phone Message    May a detailed message be left on voicemail: yes    Reason for Call: Other: Stanley called and was wondering if Pt can be seen sooner; Pt has an appt on 7/16/2019. Stanley noted that there is pus being expressed from the big toe on the right foot. There is no redness (\"no redness out of the ordinary\") or swelling to the big toe, it's not inflamed and Pt does not have a fever. Stanley also noted that on the big toe, the calcus has thicken and darkened in color. Also, Pt's toenails are getting longer and they do not have the tools to trim them. Please follow up with Pt to see if Pt can be seen sooner. Please call Stanley with any questions regarding Pt 's symptoms.    Action Taken: Message routed to:  [facility] ([facility] Ortho  "

## 2019-07-06 RX ORDER — POTASSIUM CHLORIDE 1500 MG/1
40 TABLET, EXTENDED RELEASE ORAL 2 TIMES DAILY
Qty: 120 TABLET | Refills: 11 | Status: SHIPPED | OUTPATIENT
Start: 2019-07-06 | End: 2020-07-09

## 2019-07-08 NOTE — TELEPHONE ENCOUNTER
FUTURE VISIT INFORMATION      FUTURE VISIT INFORMATION:    Date: 7.9.19    Time: 9:30 am    Location:  Derm/Surg  REFERRAL INFORMATION:    Referring provider:  Jennifer Lou    Referring providers clinic:   Dermatology    Reason for visit/diagnosis  Mohs procedure BCC Left forehead    RECORDS REQUESTED FROM:       Clinic name Comments Records Status Imaging Status    Dermatology Records/photos in Epic complete complete

## 2019-07-09 ENCOUNTER — PRE VISIT (OUTPATIENT)
Dept: DERMATOLOGY | Facility: CLINIC | Age: 69
End: 2019-07-09

## 2019-07-09 ENCOUNTER — OFFICE VISIT (OUTPATIENT)
Dept: DERMATOLOGY | Facility: CLINIC | Age: 69
End: 2019-07-09
Payer: COMMERCIAL

## 2019-07-09 VITALS — DIASTOLIC BLOOD PRESSURE: 83 MMHG | SYSTOLIC BLOOD PRESSURE: 134 MMHG | HEART RATE: 93 BPM

## 2019-07-09 DIAGNOSIS — C44.319 BASAL CELL CARCINOMA OF LEFT FOREHEAD: Primary | ICD-10-CM

## 2019-07-09 RX ORDER — TRAMADOL HYDROCHLORIDE 50 MG/1
50 TABLET ORAL EVERY 6 HOURS PRN
Qty: 10 TABLET | Refills: 0 | Status: SHIPPED | OUTPATIENT
Start: 2019-07-09 | End: 2020-05-12

## 2019-07-09 ASSESSMENT — PAIN SCALES - GENERAL
PAINLEVEL: NO PAIN (0)
PAINLEVEL: NO PAIN (0)

## 2019-07-09 NOTE — PROGRESS NOTES
[facility] Mohs Dermatologic Surgery Procedure Note    Dermatology Surgery Clinic  [facility]  [address] [place] [address]    Date of Service:  Jul 9, 2019  Surgery: Mohs micrographic surgery    Primary Surgeon: Minh  Assistant Surgeon: Crystal    Case 1  Repair Type: local flap (advancement flap with Burrow's graft)  Repair Size: 11.0 x 8.0 cm  Suture Material: Vicryl 3-0; Monocryl 4-0; Fast Absorbing Gut 5-0  Tumor Type: BCC - Basal cell carcinoma  Location: Left forehead  Derm-Path Accession #: [id number]  PreOp Size: 1.3 x 1.9 cm  PostOp Size: 1.5 x 2.1 cm  Mohs Accession #:  [id number]  Level of Defect: fat    Procedure:  We discussed the principles of treatment and most likely complications including scarring, bleeding, infection, swelling, pain, crusting, nerve damage, large wound,  incomplete excision, wound dehiscence,  nerve damage, recurrence, and a second procedure may be recommended to obtain the best cosmetic or functional result.    Informed consent was obtained and the patient underwent the procedure as follows:  The patient was placed supine on the operating table.  The cancer was identified, outlined with a marker, and verified by the patient.  The entire surgical field was prepped with Hibiclens.  The surgical site was anesthetized using Lidocaine 1% with epi 1:100,000.    The area of clinically apparent tumor was not debulked. The layer of tissue was then surgically excised using a #15 blade and was then transferred onto a specimen sheet maintaining the orientation of the specimen. Hemostasis was obtained using heat cautery. The wound site was then covered with a dressing while the tissue samples were processed for examination.    The excised tissue was transported to the Mohs histology laboratory maintaining the tissue orientation.  The tissue specimen was relaxed so that the entire surgical margin was in a a single  horizontal plane for sectioning and inked for precise mapping.  A precise reference map was drawn to reflect the sectioning of the specimen, colored inking of the margins, and orientation on the patient. The tissue was processed using horizontal sectioning of the base and continuous peripheral margins.  The histopathologic sections were reviewed in conjunction with the reference map.    Total blocks: 2    Total slides:  6    There were no cancer cells visualized on examination, therefore Mohs surgery was complete.      RECONSTRUCTION:  Advancement Flap with Burow's Graft     PROCEDURE:  The patient was taken to the operative suite and placed in a supine position on the operating room table.  The defect was identified.  Appropriate markings were made with a marking pen to plan the reconstruction.  The area was then infiltrated with Lidocaine 1% with epi 1:100,000 10.0ml.  The area was then prepped in a sterile fashion with Hibiclens and sterile drapes were applied.  The wound was debeveled and undermined broadly in all directions to the level of fat.  Hemostasis was obtained with heat cautery. The advancement flap was then designed by incising to the level of fat. The flap was further undermined in all directions.    Hemostasis was again obtained using heat cautery.  The flap was then advanced into the defect and secured using buried dermal sutures.   The secondary defect wound edges were closed with buried dermal sutures.  The epidermis was then carefully approximated using simple running Fast Absorbing Gut 5-0 epidermal sutures. Redundant areas of tissue were excised using the triangulation technique.The wound edges were sutured in similar fashion.    The remaining defect was repaired with a Burows wedge graft from the raised tissue cone of the advancement flap.  The raised cone was removed from one end of the flap to serve as a full thickness skin graft. Hemostasis was obtained using heat cautery. The 11.0 x  8.0cm  graft was trimmed of fat and placed in saline gauze.  The graft was placed onto the recipient site and secured with simple running Fast Absorbing Gut 5-0 epidermal sutures. The graft was trimmed to fit as needed with blunt scissors. Careful attention was given to even approximation of the wound edges. A vaseline gauze bolster was secured over the burrows wedge graft with suture.  A pressure dressing was applied to both surgical sites.    The wound was cleansed with saline and ointment was applied along the wound surface.  A sterile pressure of non-adherent gauze was applied and wound care instructions were given verbally and in writing.  The patient will return in 7 days for suture removal.  The patient left the operating suite in stable condition. Anticipate Dermabrasion to be used as a second stage of this reconstruction.  .    Repair Size: 11.0 x 8.0cm (Graft 3x2 cm)  Sutures Used:  Deep: monocryl 4-0;vicryl 3-0 Superficial: Fast Absorbing Gut 5-0    Anesthesia Visit Total (ml): 19 ml      The Attending Physician for key, major portions of the procedure and always immediately available.            Photo placed into patients chart note for further reference.     Staff Involved:    Scribe Disclosure  I, Ryan Patel, am serving as a scribe to document services personally performed by Dr. Pieter Wilkinson, based on data collection and the provider's statements to me.       Attending attestation:  I was present for key elements of the procedure and immediately available for all other portions of the procedure.  I have reviewed the note and edited it as necessary.    Pieter Wilkinson M.D.  Professor  Director of Dermatologic Surgery  Department of Dermatology  [facility]    Dermatology Surgery Clinic  [facility] [address] [address]

## 2019-07-09 NOTE — PATIENT INSTRUCTIONS
Wound Care Instructions  I will experience scar, altered skin color, bleeding, swelling, pain, crusting and redness. I may experience altered sensation. Risks are excessive bleeding, infection, muscle weakness, thick (hypertrophic or keloidal) scar, and recurrence,. A second procedure may be recommended to obtain the best cosmetic or functional result.  Possible complications of any surgical procedure are bleeding, infection, scarring, alteration in skin color and sensation, muscle weakness in the area, wound dehiscence or seperation, or recurrence of the lesion or disease. On occasion, after healing, a secondary procedure or revision may be recommended in order to obtain the best cosmetic or functional result.   After your surgery, a pressure bandage will be placed over the area that has sutures. This will help prevent bleeding.   For the First 48 hours After Surgery:  1. Leave the pressure bandage on and keep it dry. If it should come loose, you may retape it, but do not take it off.  2. Relax and take it easy. Do not do any vigorous exercise, heavy lifting, or bending forward. This could cause the wound to bleed.  3. Post-operative pain is usually mild. You may take plain or extra strength Tylenol every 4 hours as needed (do not take more than 4,000mg in one day). Do not take any medicine that contains aspirin, ibuprofen or motrin unless you have been recommended these by a doctor.  Avoid alcohol and vitamin E as these may increase your tendency to bleed.  4. You may put an ice pack around the bandaged area for 20 minutes every 2-3 hours. This may help reduce swelling, bruising, and pain. Make sure the ice pack is waterproof so that the pressure bandage does not get wet.   5. You may see a small amount of drainage or blood on your pressure bandage. This is normal. However, if drainage or bleeding continues or saturates the bandage, you will need to apply firm pressure over the bandage with a washcloth for 15  minutes. If bleeding continues after applying pressure for 15 minutes then go to the nearest emergency room.  48 Hours After Surgery  Carefully remove the bandage and start daily wound care and dressing changes. You may also now shower and get the wound wet. Wash wound with a mild soap and water.  Use caution when washing the wound. Be gentle and do not let the forceful shower stream hit the wound directly.  PAT dry.  Daily Wound Care:  1. Wash wound with a mild soap and water.  Use caution when washing the wound, be gentle and do not let the forceful shower stream hit the wound directly.  2. PAT DRY.  3. Apply Vaseline (from a new container or tube) over the suture line with a Q-tip. It is very important to keep the wound continuously moist, as wounds heal best in a moist environment.  4.  Keep the site covered until sutures are removed, you can cover it with a Telfa (non-stick) dressing and tape or a band-aid.    5. If you are unable to keep wound covered, you must apply Vaseline every 2 - 3 hours (while awake) to ensure it is being kept moist for optimal healing. A dressing overnight is recommended to keep the area moist.   Call Us If:  1. You have pain that is not controlled with Tylenol.  2. You have signs or symptoms of an infection, such as: fever over 100 degrees F, redness, warmth, or foul-smelling or yellow/creamy drainage from the wound.  Who should I call with questions?    [facility]: [phone]     [facility]: [phone]    For urgent needs outside of business hours call the [facility] at [phone] and ask for the dermatology resident on call

## 2019-07-09 NOTE — LETTER
7/9/2019       RE: Clarke Moreira  [address]  [facility] [address]     Dear Colleague,    Thank you for referring your patient, Clarke Moreira, to the [facility] DERMATOLOGIC SURGERY at [facility]. Please see a copy of my visit note below.    [facility] Mohs Dermatologic Surgery Procedure Note    Dermatology Surgery Clinic  [facility]  Clinics and Surgery Center  [address] [place] [address]    Date of Service:  Jul 9, 2019  Surgery: Mohs micrographic surgery    Primary Surgeon: Minh  Assistant Surgeon: Crystal    Case 1  Repair Type: local flap (advancement flap with Burrow's graft)  Repair Size: 11.0 x 8.0 cm  Suture Material: Vicryl 3-0; Monocryl 4-0; Fast Absorbing Gut 5-0  Tumor Type: BCC - Basal cell carcinoma  Location: Left forehead  Derm-Path Accession #: [id number]  PreOp Size: 1.3 x 1.9 cm  PostOp Size: 1.5 x 2.1 cm  Mohs Accession #:  IM  Level of Defect: fat    Procedure:  We discussed the principles of treatment and most likely complications including scarring, bleeding, infection, swelling, pain, crusting, nerve damage, large wound,  incomplete excision, wound dehiscence,  nerve damage, recurrence, and a second procedure may be recommended to obtain the best cosmetic or functional result.    Informed consent was obtained and the patient underwent the procedure as follows:  The patient was placed supine on the operating table.  The cancer was identified, outlined with a marker, and verified by the patient.  The entire surgical field was prepped with Hibiclens.  The surgical site was anesthetized using Lidocaine 1% with epi 1:100,000.    The area of clinically apparent tumor was not debulked. The layer of tissue was then surgically excised using a #15 blade and was then transferred onto a specimen sheet maintaining the orientation of the specimen. Hemostasis was obtained using heat cautery.  The wound site was then covered with a dressing while the tissue samples were processed for examination.    The excised tissue was transported to the [facility] histology laboratory maintaining the tissue orientation.  The tissue specimen was relaxed so that the entire surgical margin was in a a single horizontal plane for sectioning and inked for precise mapping.  A precise reference map was drawn to reflect the sectioning of the specimen, colored inking of the margins, and orientation on the patient. The tissue was processed using horizontal sectioning of the base and continuous peripheral margins.  The histopathologic sections were reviewed in conjunction with the reference map.    Total blocks: 2    Total slides:  6    There were no cancer cells visualized on examination, therefore Mohs surgery was complete.      RECONSTRUCTION:  Advancement Flap with Burow's Graft     PROCEDURE:  The patient was taken to the operative suite and placed in a supine position on the operating room table.  The defect was identified.  Appropriate markings were made with a marking pen to plan the reconstruction.  The area was then infiltrated with Lidocaine 1% with epi 1:100,000 10.0ml.  The area was then prepped in a sterile fashion with Hibiclens and sterile drapes were applied.  The wound was debeveled and undermined broadly in all directions to the level of fat.  Hemostasis was obtained with heat cautery. The advancement flap was then designed by incising to the level of fat. The flap was further undermined in all directions.    Hemostasis was again obtained using heat cautery.  The flap was then advanced into the defect and secured using buried dermal sutures.   The secondary defect wound edges were closed with buried dermal sutures.  The epidermis was then carefully approximated using simple running Fast Absorbing Gut 5-0 epidermal sutures. Redundant areas of tissue were excised using the triangulation technique.The wound edges were  sutured in similar fashion.    The remaining defect was repaired with a Burows wedge graft from the raised tissue cone of the advancement flap.  The raised cone was removed from one end of the flap to serve as a full thickness skin graft. Hemostasis was obtained using heat cautery. The 11.0 x 8.0cm  graft was trimmed of fat and placed in saline gauze.  The graft was placed onto the recipient site and secured with simple running Fast Absorbing Gut 5-0 epidermal sutures. The graft was trimmed to fit as needed with blunt scissors. Careful attention was given to even approximation of the wound edges. A vaseline gauze bolster was secured over the burrows wedge graft with suture.  A pressure dressing was applied to both surgical sites.    The wound was cleansed with saline and ointment was applied along the wound surface.  A sterile pressure of non-adherent gauze was applied and wound care instructions were given verbally and in writing.  The patient will return in 7 days for suture removal.  The patient left the operating suite in stable condition. Anticipate Dermabrasion to be used as a second stage of this reconstruction.  .    Repair Size: 11.0 x 8.0cm (Graft 3x2 cm)  Sutures Used:  Deep: monocryl 4-0;vicryl 3-0 Superficial: Fast Absorbing Gut 5-0    Anesthesia Visit Total (ml): 19 ml      The Attending Physician for key, major portions of the procedure and always immediately available.            Photo placed into patients chart note for further reference.     Staff Involved:    Scribe Disclosure  I, Ryan Patel, am serving as a scribe to document services personally performed by Dr. Pieter Wilkinson, based on data collection and the provider's statements to me.       Attending attestation:  I was present for key elements of the procedure and immediately available for all other portions of the procedure.  I have reviewed the note and edited it as necessary.    Pieter Wilkinson M.D.  Professor  Director of  Dermatologic Surgery  Department of Dermatology  [facility]    Dermatology Surgery Clinic  [facility]  [address] [place] [address]

## 2019-07-09 NOTE — NURSING NOTE
Chief Complaint   Patient presents with     Derm Problem     BCC L forehead     Oma Marmolejo, EMT

## 2019-07-10 ENCOUNTER — OFFICE VISIT (OUTPATIENT)
Dept: ORTHOPEDICS | Facility: CLINIC | Age: 69
End: 2019-07-10
Payer: COMMERCIAL

## 2019-07-10 ENCOUNTER — TELEPHONE (OUTPATIENT)
Dept: DERMATOLOGY | Facility: CLINIC | Age: 69
End: 2019-07-10

## 2019-07-10 DIAGNOSIS — E11.42 TYPE 2 DIABETES MELLITUS WITH DIABETIC POLYNEUROPATHY, UNSPECIFIED WHETHER LONG TERM INSULIN USE (H): ICD-10-CM

## 2019-07-10 DIAGNOSIS — L97.512 DIABETIC ULCER OF TOE OF RIGHT FOOT ASSOCIATED WITH TYPE 2 DIABETES MELLITUS, WITH FAT LAYER EXPOSED (H): Primary | ICD-10-CM

## 2019-07-10 DIAGNOSIS — E11.621 DIABETIC ULCER OF TOE OF RIGHT FOOT ASSOCIATED WITH TYPE 2 DIABETES MELLITUS, WITH FAT LAYER EXPOSED (H): Primary | ICD-10-CM

## 2019-07-10 DIAGNOSIS — B35.1 OM (ONYCHOMYCOSIS): ICD-10-CM

## 2019-07-10 NOTE — TELEPHONE ENCOUNTER
Follow up call completed following Mohs procedure with Dr. Wilkinson.     The following questions should be asked:  What are you doing to manage your pain?   Is it helping?  Are you applying ice?    Have you had any noticeable bleeding through the bandage?   Do you have any concerns?         Please call [phone] option [phone] if you have any additional questions.

## 2019-07-10 NOTE — PROGRESS NOTES
Chief Complaint:   Chief Complaint   Patient presents with     Follow Up     Pus/infection concern, right hallux. Patient stated that his left foot web spaces feel raw.      Subjective: Clarke is a 68 year old male who presents to the clinic today for diabetic foot care. Wound to R hallux still present, minimal bloody drainage. Keeping it covered with bandage or left open. Also has calluses to distal left 2nd and 3rd digits without drainage noted. Would appreciate toenails trimmed today. Denies foot pain. Admits foot numbness and LE edema. Denies n/v, fever, chills, lethargy, redness, purulent drainage.    No Known Allergies  Current Outpatient Rx   Medication Sig Dispense Refill     ammonium lactate (LAC-HYDRIN) 12 % external cream Apply topically 2 times daily as needed for dry skin 385 g 0     Ascorbic Acid (VITAMIN C) POWD Take 500 mcg by mouth daily  0     aspirin (ASA) 81 MG tablet Take 1 tablet (81 mg) by mouth daily 30 tablet 0     atorvastatin (LIPITOR) 40 MG tablet Take 1 tablet (40 mg) by mouth daily 30 tablet 11     bacitracin 500 UNIT/GM external ointment Apply 30 g topically 2 times daily 30 g 1     blood glucose (ONETOUCH ULTRA) test strip Use to test blood sugars 2 times daily or as directed. 100 strip 11     blood glucose monitoring (ONE TOUCH DELICA) lancets Use to test blood sugars 2 times daily or as directed. 100 each 11     blood glucose monitoring (ONE TOUCH ULTRA MINI) meter device kit Use to test blood sugars 2 times daily or as directed. 1 kit 0     levothyroxine (SYNTHROID/LEVOTHROID) 175 MCG tablet Take 1 tablet (175 mcg) by mouth every morning (before breakfast) 90 tablet 3     metFORMIN (GLUCOPHAGE) 500 MG tablet Take 1 tablet (500 mg) by mouth 2 times daily (with meals) 60 tablet 11     metoprolol succinate ER (TOPROL-XL) 100 MG 24 hr tablet Take 1 tablet (100 mg) by mouth daily 30 tablet 11     multivitamin w/minerals (MULTI-VITAMIN) tablet Take 1 tablet by mouth daily 30 each 0      naproxen (NAPROSYN) 500 MG tablet Take 1 tablet (500 mg) by mouth 2 times daily (with meals) 10 tablet 1     nystatin (MYCOSTATIN) 526670 UNIT/GM external cream Apply topically 2 times daily as needed for dry skin 30 g 11     omega 3 1000 MG CAPS Take 1 g by mouth daily 30 capsule 0     order for DME Equipment being ordered: Diabetes Shoes 1 Units 0     order for DME Equipment being ordered: Custom diabetic shoes 1 Units 0     order for DME Equipment being ordered: Bariatric Lift chair  Diagnosis - morbid obesity, CHF, Lymphedema    Fax to Ne from [employer] at [phone]. 1 Units 0     order for DME Equipment being ordered: Other: Velcro Compression stockings.   Treatment Diagnosis: bilateral lymphedema. 2 each 0     polyethylene glycol (MIRALAX/GLYCOLAX) packet Take 17 g by mouth daily as needed for constipation 15 packet 0     potassium chloride ER (K-DUR/KLOR-CON M) 20 MEQ CR tablet Take 2 tablets (40 mEq) by mouth 2 times daily 120 tablet 11     senna-docusate (SENOKOT-S/PERICOLACE) 8.6-50 MG tablet Take 1-2 tablets by mouth 2 times daily as needed for constipation 60 tablet 0     spironolactone (ALDACTONE) 25 MG tablet Take 1 tablet (25 mg) by mouth daily 30 tablet 6     tamsulosin (FLOMAX) 0.4 MG capsule Take 1 capsule (0.4 mg) by mouth daily 30 capsule 11     torsemide (DEMADEX) 100 MG tablet Take 1 tablet (100 mg) by mouth daily Take one tab (100mg) by mouth every morning, take one half tab (50mg) by mouth every evening 30 tablet 11     traMADol (ULTRAM) 50 MG tablet Take 1 tablet (50 mg) by mouth every 6 hours as needed for severe pain 10 tablet 0     vitamin B complex with vitamin C (VITAMIN  B COMPLEX) tablet Take 1 tablet by mouth daily 100 tablet 3     vitamin E (VITAMIN E COMPLEX) 1000 units (450 mg) capsule Take 1 capsule (1,000 Units) by mouth daily 100 capsule 3     warfarin (COUMADIN) 5 MG tablet Take 12.5 mg by mouth  1x/week, Take 10 mg by mouth 6x per week 75 tablet 11       Objective:    There were no vitals taken for this visit.   General: Patient is well groomed, appears stated age, is alert, cooperative and in no acute distress.  Vascular: DP and PT pulses are nonpalpable bilaterally. LE capillary refill time is <3 seconds. - pedal hair. Mild non-pitting LE edema.  MSK: No pain with active or passive ROM of the ankle, MTJ, 1st ray or halluces bilaterally. Equinus present bilaterally. Dorsally contracted digits 2-5 BL. Flat foot type bilaterally.  Neurologic: Gross sensation decreased to bilateral LE.  Skin: Venous stasis discoloration BL. Toenails are elongated, discolored and thickened with subungual debris bilaterally. Hyperkeratotic tissue build up with underlying ecchymosis to distal 2nd and 3rd left digits. No open lesion upon debridement of L 3rd digit callus.    A diabetic wound is noted at right  distal hallux measuring 2 cm x 2.3 cm x 0.1 cm. Nontender to palpation.  Sosa Classification: 2  Tissue Depth: subcutaneous  Wound base: Red/Granulation  Edges: Hyperkeratotic  Drainage: scant/serous and serosanguinous  Odor: No   Undermining: No  Tunneling: No  Bone Exposure: No  Clinical Signs of Infection: No        Wound #2  A diabetic wound is noted at left  Distal L 2nd digit measuring 0.5 cm x 0.2 cm x 0.1 cm. Nontender to palpation.  Sosa Classification: 2  Tissue Depth: subcutaneous  Wound base: Red/Granulation  Edges: hyperkeratotic  Drainage: light/serous  Odor: No   Undermining: No  Tunneling: No  Bone Exposure: No  Clinical Signs of Infection: No        Lab Results   Component Value Date    A1C 6.1 02/26/2019    A1C 5.7 08/28/2018     Assessment:   - Diabetic ulcer of right hallux and left 2nd digit  - DM type 2, controlled  - Diabetic peripheral neuropathy  - PAD     Plan:   - Wounds present to R hallux and L 2nd digit, no signs of infection today.   - Toenails debrided x 10  - Devitalized tissue of wound was excisionally debrided with #15 blade to level of subcutaneous tissue  at the deepest. No bleeding occurred, no anesthesia necessary.  - Keep 2nd digit covered with band-aid, change daily  - Wound care instructions R hallux, perform daily: 1. Spray with wound cleanser, pat dry 2. Apply silvcercel to wound base 3. Apply mepilex (cut in half).   - Activity: Recommend NWB to R foot as much as possible and in open toed or off-loading shoe to right foot at all times except bed.   - Referral to home nursing for wound care 3 times per week.   - RTC 2 weeks

## 2019-07-10 NOTE — LETTER
7/10/2019       RE: Clarke Moreira  [address]  [facility] [address]     Dear Colleague,    Thank you for referring your patient, Clarke Moreira, to the [facility] at [facility]. Please see a copy of my visit note below.    Chief Complaint:   Chief Complaint   Patient presents with     Follow Up     Pus/infection concern, right hallux. Patient stated that his left foot web spaces feel raw.      Subjective: Clarke is a 68 year old male who presents to the clinic today for diabetic foot care. Wound to R hallux still present, minimal bloody drainage. Keeping it covered with bandage or left open. Also has calluses to distal left 2nd and 3rd digits without drainage noted. Would appreciate toenails trimmed today. Denies foot pain. Admits foot numbness and LE edema. Denies n/v, fever, chills, lethargy, redness, purulent drainage.    No Known Allergies  Current Outpatient Rx   Medication Sig Dispense Refill     ammonium lactate (LAC-HYDRIN) 12 % external cream Apply topically 2 times daily as needed for dry skin 385 g 0     Ascorbic Acid (VITAMIN C) POWD Take 500 mcg by mouth daily  0     aspirin (ASA) 81 MG tablet Take 1 tablet (81 mg) by mouth daily 30 tablet 0     atorvastatin (LIPITOR) 40 MG tablet Take 1 tablet (40 mg) by mouth daily 30 tablet 11     bacitracin 500 UNIT/GM external ointment Apply 30 g topically 2 times daily 30 g 1     blood glucose (ONETOUCH ULTRA) test strip Use to test blood sugars 2 times daily or as directed. 100 strip 11     blood glucose monitoring (ONE TOUCH DELICA) lancets Use to test blood sugars 2 times daily or as directed. 100 each 11     blood glucose monitoring (ONE TOUCH ULTRA MINI) meter device kit Use to test blood sugars 2 times daily or as directed. 1 kit 0     levothyroxine (SYNTHROID/LEVOTHROID) 175 MCG tablet Take 1 tablet (175 mcg) by mouth every morning (before breakfast) 90 tablet 3     metFORMIN  (GLUCOPHAGE) 500 MG tablet Take 1 tablet (500 mg) by mouth 2 times daily (with meals) 60 tablet 11     metoprolol succinate ER (TOPROL-XL) 100 MG 24 hr tablet Take 1 tablet (100 mg) by mouth daily 30 tablet 11     multivitamin w/minerals (MULTI-VITAMIN) tablet Take 1 tablet by mouth daily 30 each 0     naproxen (NAPROSYN) 500 MG tablet Take 1 tablet (500 mg) by mouth 2 times daily (with meals) 10 tablet 1     nystatin (MYCOSTATIN) 284150 UNIT/GM external cream Apply topically 2 times daily as needed for dry skin 30 g 11     omega 3 1000 MG CAPS Take 1 g by mouth daily 30 capsule 0     order for DME Equipment being ordered: Diabetes Shoes 1 Units 0     order for DME Equipment being ordered: Custom diabetic shoes 1 Units 0     order for DME Equipment being ordered: Bariatric Lift chair  Diagnosis - morbid obesity, CHF, Lymphedema    Fax to Ne from [employer] at [phone]. 1 Units 0     order for DME Equipment being ordered: Other: Velcro Compression stockings.   Treatment Diagnosis: bilateral lymphedema. 2 each 0     polyethylene glycol (MIRALAX/GLYCOLAX) packet Take 17 g by mouth daily as needed for constipation 15 packet 0     potassium chloride ER (K-DUR/KLOR-CON M) 20 MEQ CR tablet Take 2 tablets (40 mEq) by mouth 2 times daily 120 tablet 11     senna-docusate (SENOKOT-S/PERICOLACE) 8.6-50 MG tablet Take 1-2 tablets by mouth 2 times daily as needed for constipation 60 tablet 0     spironolactone (ALDACTONE) 25 MG tablet Take 1 tablet (25 mg) by mouth daily 30 tablet 6     tamsulosin (FLOMAX) 0.4 MG capsule Take 1 capsule (0.4 mg) by mouth daily 30 capsule 11     torsemide (DEMADEX) 100 MG tablet Take 1 tablet (100 mg) by mouth daily Take one tab (100mg) by mouth every morning, take one half tab (50mg) by mouth every evening 30 tablet 11     traMADol (ULTRAM) 50 MG tablet Take 1 tablet (50 mg) by mouth every 6 hours as needed for severe pain 10 tablet 0     vitamin B complex with vitamin C (VITAMIN  B  COMPLEX) tablet Take 1 tablet by mouth daily 100 tablet 3     vitamin E (VITAMIN E COMPLEX) 1000 units (450 mg) capsule Take 1 capsule (1,000 Units) by mouth daily 100 capsule 3     warfarin (COUMADIN) 5 MG tablet Take 12.5 mg by mouth  1x/week, Take 10 mg by mouth 6x per week 75 tablet 11       Objective:   There were no vitals taken for this visit.   General: Patient is well groomed, appears stated age, is alert, cooperative and in no acute distress.  Vascular: DP and PT pulses are nonpalpable bilaterally. LE capillary refill time is <3 seconds. - pedal hair. Mild non-pitting LE edema.  MSK: No pain with active or passive ROM of the ankle, MTJ, 1st ray or halluces bilaterally. Equinus present bilaterally. Dorsally contracted digits 2-5 BL. Flat foot type bilaterally.  Neurologic: Gross sensation decreased to bilateral LE.  Skin: Venous stasis discoloration BL. Toenails are elongated, discolored and thickened with subungual debris bilaterally. Hyperkeratotic tissue build up with underlying ecchymosis to distal 2nd and 3rd left digits. No open lesion upon debridement of L 3rd digit callus.    A diabetic wound is noted at right  distal hallux measuring 2 cm x 2.3 cm x 0.1 cm. Nontender to palpation.  Sosa Classification: 2  Tissue Depth: subcutaneous  Wound base: Red/Granulation  Edges: Hyperkeratotic  Drainage: scant/serous and serosanguinous  Odor: No   Undermining: No  Tunneling: No  Bone Exposure: No  Clinical Signs of Infection: No        Wound #2  A diabetic wound is noted at left  Distal L 2nd digit measuring 0.5 cm x 0.2 cm x 0.1 cm. Nontender to palpation.  Sosa Classification: 2  Tissue Depth: subcutaneous  Wound base: Red/Granulation  Edges: hyperkeratotic  Drainage: light/serous  Odor: No   Undermining: No  Tunneling: No  Bone Exposure: No  Clinical Signs of Infection: No        Lab Results   Component Value Date    A1C 6.1 02/26/2019    A1C 5.7 08/28/2018     Assessment:   - Diabetic ulcer of right  hallux and left 2nd digit  - DM type 2, controlled  - Diabetic peripheral neuropathy  - PAD     Plan:   - Wounds present to R hallux and L 2nd digit, no signs of infection today.   - Toenails debrided x 10  - Devitalized tissue of wound was excisionally debrided with #15 blade to level of subcutaneous tissue at the deepest. No bleeding occurred, no anesthesia necessary.  - Keep 2nd digit covered with band-aid, change daily  - Wound care instructions R hallux, perform daily: 1. Spray with wound cleanser, pat dry 2. Apply silvcercel to wound base 3. Apply mepilex (cut in half).   - Activity: Recommend NWB to R foot as much as possible and in open toed or off-loading shoe to right foot at all times except bed.   - Referral to home nursing for wound care 3 times per week.   - RTC 2 weeks    Paula Emery PA-C

## 2019-07-10 NOTE — NURSING NOTE
Reason For Visit:   Chief Complaint   Patient presents with     Follow Up     Pus/infection concern, right hallux. Patient stated that his left foot web spaces feel raw.        Pain Assessment  Patient Currently in Pain: Yes  Primary Pain Location: (Right hallux)  Pain Descriptors: Intermittent, Shooting, Aching  Aggravating Factors: Walking, Standing        No Known Allergies        Whitley Munoz LPN

## 2019-07-16 ENCOUNTER — OFFICE VISIT (OUTPATIENT)
Dept: DERMATOLOGY | Facility: CLINIC | Age: 69
End: 2019-07-16
Payer: COMMERCIAL

## 2019-07-16 DIAGNOSIS — C44.319 BASAL CELL CARCINOMA (BCC) OF LEFT FOREHEAD: Primary | ICD-10-CM

## 2019-07-16 DIAGNOSIS — I48.20 CHRONIC ATRIAL FIBRILLATION (H): ICD-10-CM

## 2019-07-16 LAB — INR PPP: 1.91 (ref 0.86–1.14)

## 2019-07-16 ASSESSMENT — PAIN SCALES - GENERAL: PAINLEVEL: NO PAIN (0)

## 2019-07-16 NOTE — LETTER
7/16/2019       RE: Clarke Moreira  [address]  [facility] [address]     Dear Colleague,    Thank you for referring your patient, Clarke Moreira, to the [facility] DERMATOLOGIC SURGERY at [facility]. Please see a copy of my visit note below.    Chief Complaint   Patient presents with     Derm Problem     Patient is here today for bolster removal, no complaints.      Brit Pichardo LPN      [facility] Dermatology Post-operative Visit note:  Subjective: The patient follows up for a wound check on the L forhead. Treated with MMS on 7/9/19 for BCC of the site; repaired with advancement flap with FTSG. The patient says the site(s) is(are) doing well and denies any bleeding, purulence, or excess pain/tenderness. Performing daily wound care.   Objective: On exam, there is an (are) appropriately healing surgical site(s) on the L forehead with no purulence, tenderness or surrounding erythema. Graft is well appearing and pink. See photos. Sutures in place; dissolving.   Assessment and Plan: Appropriately healing surgical site without any signs of infection  # BCC L forehead, S/p MMS with Advancement flap and full-thickness Skin graft. Well healing without difficulty; excellent graft take.   Continue with daily vaseline/bandage overlying the graft site; will transition to normal skin care on the rest of the repair site.   Recommend sunscreens SPF #50 or greater and protective clothing. Risk of scar dyspigmentation discussed.   Recommended periodic self skin exams and report of any new or changing lesions.   Please refer to previous clinic note for details regarding other skin cancers.  Follow up in 2 weeks, sooner PRN    Fellow: Ronald Rojas MD    Staff: Pieter Wilkinson MD    Attending Attestation  I attest that the Scribe recorded the interview and exam that I personally performed.  I have reviewed the note and edited it as necessary.    Pieter MIGUEL  Minh BARENS  Professor  Director of Dermatologic Surgery  Department of Dermatology  [facility]

## 2019-07-16 NOTE — PROGRESS NOTES
[facility] Dermatology Post-operative Visit note:  Subjective: The patient follows up for a wound check on the L forhead. Treated with MMS on 7/9/19 for BCC of the site; repaired with advancement flap with FTSG. The patient says the site(s) is(are) doing well and denies any bleeding, purulence, or excess pain/tenderness. Performing daily wound care.   Objective: On exam, there is an (are) appropriately healing surgical site(s) on the L forehead with no purulence, tenderness or surrounding erythema. Graft is well appearing and pink. See photos. Sutures in place; dissolving.   Assessment and Plan: Appropriately healing surgical site without any signs of infection  # BCC L forehead, S/p MMS with Advancement flap and full-thickness Skin graft. Well healing without difficulty; excellent graft take.   Continue with daily vaseline/bandage overlying the graft site; will transition to normal skin care on the rest of the repair site.   Recommend sunscreens SPF #50 or greater and protective clothing. Risk of scar dyspigmentation discussed.   Recommended periodic self skin exams and report of any new or changing lesions.   Please refer to previous clinic note for details regarding other skin cancers.  Follow up in 2 weeks, sooner PRN    Fellow: Ronald Rojas MD    Staff: Pieter Wilkinson MD    Attending Attestation  I attest that the Scribe recorded the interview and exam that I personally performed.  I have reviewed the note and edited it as necessary.    Pieter Wilkinson M.D.  Professor  Director of Dermatologic Surgery  Department of Dermatology  [facility]

## 2019-07-22 ENCOUNTER — OFFICE VISIT (OUTPATIENT)
Dept: PSYCHOLOGY | Facility: CLINIC | Age: 69
End: 2019-07-22
Payer: COMMERCIAL

## 2019-07-22 DIAGNOSIS — F33.1 MODERATE EPISODE OF RECURRENT MAJOR DEPRESSIVE DISORDER (H): Primary | ICD-10-CM

## 2019-07-22 DIAGNOSIS — F41.1 GAD (GENERALIZED ANXIETY DISORDER): ICD-10-CM

## 2019-07-22 NOTE — PROGRESS NOTES
"Behavioral Health Progress Note    Client Legal Name: Clarke Moreira   Client Preferred Name: Clarke   Service Type: Individual  Length of Visit: 45 minutes  Attendees: patient     Identifying Information and Presenting Problem:    The patient is a 68 year old American male who is being seen for problematic symptoms of depression, ongoing adjustment to medical illness, as well as social isolation.  Clarke has continued to struggle more with his mood with the recent developments with his health.      Treatment Objective(s) Addressed in This Session:    reports one goal is maintaining healthy eating patterns, feels as if he is returning to old patterns before he went into the hospital and believes this is indicative of his mental disease, doesn't want to do physical activity because then he has to leave his home.    Progress on / Status of Treatment Objective(s) / Homework:  Feeling down and anxious about health     PHQ-9 SCORE 10/22/2018 12/11/2018 5/10/2019   PHQ-9 Total Score - - -   PHQ-9 Total Score 21 25 22       CHRIS-7 SCORE 10/22/2018 12/11/2018 5/10/2019   Total Score - - -   Total Score 13 20 13     Topics Discussed/Interventions Provided:  Clarke asked to focus today on how he comes up with excuses for not showing up for other people or other things for himself.  Much of this stems from fears that he will be a disappointment to other people.  Provided several examples of this from his past.  Prompted him to also share some current examples in which he did show up and do what needed to be done even though it was causing increased negative emotion for him (I.e. Forms at apartment, appts for his toe, etc).  Clarke described how he \"puts on a show\" for a friend and tries to be someone that is entertaining to her.  Explored how he might be more genuine in these interactions so that he is giving his friend the opportunity to really know, as well as to be more true to himself and give himself the " opportunity to see that he can be himself and be friends with other people.     Clarke was very grateful for Bladimir's check-in phone call re: his toe.      Assessment: The patient appeared to be active and engaged in today's session and was receptive to feedback.    Mental Status: Clarke appeared alert and oriented. He was casually and neatly dressed and groomed appropriately.  Eye contact was good.  Speech was normal rate and rhythm.  Mood was described as down.  Affect appeared down.  Thought processes were logical and coherent.  Thought content was WNL with no evidence of psychotic or paranoid features. No evidence of SI/HI or self-harm, intent, or plans. Memory appeared grossly intact. Insight and judgment appeared good and patient exhibited good impulse control during the appointment.     Does the patient appear to be at imminent risk of harm to self/others at this time? No    The session was necessary to identify and challenge unhelpful thinking that occurs regarding his perception of himself and reinforced his ability to reframe these thoughts.  Additionally, we discussed his goals for treatment and will complete the treatment plan at our next visit. Symptoms of depression and anxiety have continued to interfere with his ability to function at home and cause distress that can interfere with his ability to care for himself.  Ongoing psychotherapy is necessary to provide counseling and provide support.    Diagnosis (DSM-5):  Major Depression, recurrent, moderate  Generalized Anxiety Disorder  R/o persistent depressive disorder    Plan:  1. Return in two weeks.  2. Fill out tx plan at next visit.    NOTE: Treatment plan needed.  Diagnostic assessment update due 10/15/19.

## 2019-07-24 ENCOUNTER — OFFICE VISIT (OUTPATIENT)
Dept: ORTHOPEDICS | Facility: CLINIC | Age: 69
End: 2019-07-24
Payer: COMMERCIAL

## 2019-07-24 DIAGNOSIS — L97.512 DIABETIC ULCER OF TOE OF RIGHT FOOT ASSOCIATED WITH TYPE 2 DIABETES MELLITUS, WITH FAT LAYER EXPOSED (H): Primary | ICD-10-CM

## 2019-07-24 DIAGNOSIS — E11.621 DIABETIC ULCER OF TOE OF RIGHT FOOT ASSOCIATED WITH TYPE 2 DIABETES MELLITUS, WITH FAT LAYER EXPOSED (H): Primary | ICD-10-CM

## 2019-07-24 NOTE — PROGRESS NOTES
Chief Complaint:   Chief Complaint   Patient presents with     Wound Check     Bilateral foot.      Subjective: Clarke is a 68 year old male who presents to the clinic today for follow up of diabetic toe wounds. Home nursing is dressing toe with silvercel 3 times a week. Denies foot pain. Admits foot numbness and LE edema. Denies n/v, fever, chills, lethargy, redness, purulent drainage.      No Known Allergies  Current Outpatient Rx   Medication Sig Dispense Refill     ammonium lactate (LAC-HYDRIN) 12 % external cream Apply topically 2 times daily as needed for dry skin 385 g 0     Ascorbic Acid (VITAMIN C) POWD Take 500 mcg by mouth daily  0     aspirin (ASA) 81 MG tablet Take 1 tablet (81 mg) by mouth daily 30 tablet 0     atorvastatin (LIPITOR) 40 MG tablet Take 1 tablet (40 mg) by mouth daily 30 tablet 11     bacitracin 500 UNIT/GM external ointment Apply 30 g topically 2 times daily 30 g 1     blood glucose (ONETOUCH ULTRA) test strip Use to test blood sugars 2 times daily or as directed. 100 strip 11     blood glucose monitoring (ONE TOUCH DELICA) lancets Use to test blood sugars 2 times daily or as directed. 100 each 11     blood glucose monitoring (ONE TOUCH ULTRA MINI) meter device kit Use to test blood sugars 2 times daily or as directed. 1 kit 0     levothyroxine (SYNTHROID/LEVOTHROID) 175 MCG tablet Take 1 tablet (175 mcg) by mouth every morning (before breakfast) 90 tablet 3     metFORMIN (GLUCOPHAGE) 500 MG tablet Take 1 tablet (500 mg) by mouth 2 times daily (with meals) 60 tablet 11     metoprolol succinate ER (TOPROL-XL) 100 MG 24 hr tablet Take 1 tablet (100 mg) by mouth daily 30 tablet 11     multivitamin w/minerals (MULTI-VITAMIN) tablet Take 1 tablet by mouth daily 30 each 0     naproxen (NAPROSYN) 500 MG tablet Take 1 tablet (500 mg) by mouth 2 times daily (with meals) 10 tablet 1     nystatin (MYCOSTATIN) 692104 UNIT/GM external cream Apply topically 2 times daily as needed for dry skin 30 g  11     omega 3 1000 MG CAPS Take 1 g by mouth daily 30 capsule 0     order for DME Equipment being ordered: Diabetes Shoes 1 Units 0     order for DME Equipment being ordered: Custom diabetic shoes 1 Units 0     order for DME Equipment being ordered: Bariatric Lift chair  Diagnosis - morbid obesity, CHF, Lymphedema    Fax to Ne from [employer] [facility] at [phone]. 1 Units 0     order for DME Equipment being ordered: Other: Velcro Compression stockings.   Treatment Diagnosis: bilateral lymphedema. 2 each 0     polyethylene glycol (MIRALAX/GLYCOLAX) packet Take 17 g by mouth daily as needed for constipation 15 packet 0     potassium chloride ER (K-DUR/KLOR-CON M) 20 MEQ CR tablet Take 2 tablets (40 mEq) by mouth 2 times daily 120 tablet 11     senna-docusate (SENOKOT-S/PERICOLACE) 8.6-50 MG tablet Take 1-2 tablets by mouth 2 times daily as needed for constipation 60 tablet 0     spironolactone (ALDACTONE) 25 MG tablet Take 1 tablet (25 mg) by mouth daily 30 tablet 6     tamsulosin (FLOMAX) 0.4 MG capsule Take 1 capsule (0.4 mg) by mouth daily 30 capsule 11     torsemide (DEMADEX) 100 MG tablet Take 1 tablet (100 mg) by mouth daily Take one tab (100mg) by mouth every morning, take one half tab (50mg) by mouth every evening 30 tablet 11     traMADol (ULTRAM) 50 MG tablet Take 1 tablet (50 mg) by mouth every 6 hours as needed for severe pain 10 tablet 0     vitamin B complex with vitamin C (VITAMIN  B COMPLEX) tablet Take 1 tablet by mouth daily 100 tablet 3     vitamin E (VITAMIN E COMPLEX) 1000 units (450 mg) capsule Take 1 capsule (1,000 Units) by mouth daily 100 capsule 3     warfarin (COUMADIN) 5 MG tablet Take 12.5 mg by mouth  1x/week, Take 10 mg by mouth 6x per week 75 tablet 11       Objective:   There were no vitals taken for this visit.   General: Patient is well groomed, appears stated age, is alert, cooperative and in no acute distress.  Skin:  Hyperkeratotic tissue build up with underlying ecchymosis to  distal 2nd and 3rd left digits. No open lesion upon debridement. One open lesion present    Wound #1  A diabetic wound is noted at right  distal hallux measuring 0.8 cm x 0.9 cm x 0.1 cm. Nontender to palpation.  Sosa Classification: 2  Tissue Depth: subcutaneous  Wound base: pink/Granulation  Edges: Hyperkeratotic  Drainage: scant/serous and serosanguinous  Odor: No   Undermining: No  Tunneling: No  Bone Exposure: No  Clinical Signs of Infection: No      7/10/19      7/24/19    Wound #2  Hyperkeratotic tissue present to L distal second digit was debrided today, no underlying lesion present but there is ecchymosis within dermis below callus.     Lab Results   Component Value Date    A1C 6.1 02/26/2019    A1C 5.7 08/28/2018     Assessment:   - Diabetic ulcer of right hallux      Plan:   - Wound present to R hallux, no signs of infection today.   - Devitalized tissue of wound was excisionally debrided with #15 blade to level of subcutaneous tissue at the deepest. Healthy bleeding occurred, no anesthesia necessary. Silver nitrate used x 1 to L 2nd digit to successfully control bleeding.   - Keep 2nd digit covered with band-aid until later today, then okay to keep uncovered. Recommend toe sleeve only if able to remove nightly.  - Wound care instructions R [facility] home nursing to perform 3 times per week: 1. Spray with wound cleanser, pat dry 2. Apply silvcercel to wound base 3. Apply mepilex (cut in half).   - Activity: Recommend NWB to R foot as much as possible and in open toed or off-loading shoe to right foot at all times except bed.   - RTC 2 weeks

## 2019-07-24 NOTE — LETTER
7/24/2019       RE: Clarke Moreira  [address]  [facility] [address]     Dear Colleague,    Thank you for referring your patient, Clarke Moreira, to the [facility] at [facility]. Please see a copy of my visit note below.    Chief Complaint:   Chief Complaint   Patient presents with     Wound Check     Bilateral foot.      Subjective: Clarke is a 68 year old male who presents to the clinic today for follow up of diabetic toe wounds. Home nursing is dressing toe with silvercel 3 times a week. Denies foot pain. Admits foot numbness and LE edema. Denies n/v, fever, chills, lethargy, redness, purulent drainage.      No Known Allergies  Current Outpatient Rx   Medication Sig Dispense Refill     ammonium lactate (LAC-HYDRIN) 12 % external cream Apply topically 2 times daily as needed for dry skin 385 g 0     Ascorbic Acid (VITAMIN C) POWD Take 500 mcg by mouth daily  0     aspirin (ASA) 81 MG tablet Take 1 tablet (81 mg) by mouth daily 30 tablet 0     atorvastatin (LIPITOR) 40 MG tablet Take 1 tablet (40 mg) by mouth daily 30 tablet 11     bacitracin 500 UNIT/GM external ointment Apply 30 g topically 2 times daily 30 g 1     blood glucose (ONETOUCH ULTRA) test strip Use to test blood sugars 2 times daily or as directed. 100 strip 11     blood glucose monitoring (ONE TOUCH DELICA) lancets Use to test blood sugars 2 times daily or as directed. 100 each 11     blood glucose monitoring (ONE TOUCH ULTRA MINI) meter device kit Use to test blood sugars 2 times daily or as directed. 1 kit 0     levothyroxine (SYNTHROID/LEVOTHROID) 175 MCG tablet Take 1 tablet (175 mcg) by mouth every morning (before breakfast) 90 tablet 3     metFORMIN (GLUCOPHAGE) 500 MG tablet Take 1 tablet (500 mg) by mouth 2 times daily (with meals) 60 tablet 11     metoprolol succinate ER (TOPROL-XL) 100 MG 24 hr tablet Take 1 tablet (100 mg) by mouth daily 30 tablet 11      multivitamin w/minerals (MULTI-VITAMIN) tablet Take 1 tablet by mouth daily 30 each 0     naproxen (NAPROSYN) 500 MG tablet Take 1 tablet (500 mg) by mouth 2 times daily (with meals) 10 tablet 1     nystatin (MYCOSTATIN) 019058 UNIT/GM external cream Apply topically 2 times daily as needed for dry skin 30 g 11     omega 3 1000 MG CAPS Take 1 g by mouth daily 30 capsule 0     order for DME Equipment being ordered: Diabetes Shoes 1 Units 0     order for DME Equipment being ordered: Custom diabetic shoes 1 Units 0     order for DME Equipment being ordered: Bariatric Lift chair  Diagnosis - morbid obesity, CHF, Lymphedema    Fax to Ne from [employer] at [phone]. 1 Units 0     order for DME Equipment being ordered: Other: Velcro Compression stockings.   Treatment Diagnosis: bilateral lymphedema. 2 each 0     polyethylene glycol (MIRALAX/GLYCOLAX) packet Take 17 g by mouth daily as needed for constipation 15 packet 0     potassium chloride ER (K-DUR/KLOR-CON M) 20 MEQ CR tablet Take 2 tablets (40 mEq) by mouth 2 times daily 120 tablet 11     senna-docusate (SENOKOT-S/PERICOLACE) 8.6-50 MG tablet Take 1-2 tablets by mouth 2 times daily as needed for constipation 60 tablet 0     spironolactone (ALDACTONE) 25 MG tablet Take 1 tablet (25 mg) by mouth daily 30 tablet 6     tamsulosin (FLOMAX) 0.4 MG capsule Take 1 capsule (0.4 mg) by mouth daily 30 capsule 11     torsemide (DEMADEX) 100 MG tablet Take 1 tablet (100 mg) by mouth daily Take one tab (100mg) by mouth every morning, take one half tab (50mg) by mouth every evening 30 tablet 11     traMADol (ULTRAM) 50 MG tablet Take 1 tablet (50 mg) by mouth every 6 hours as needed for severe pain 10 tablet 0     vitamin B complex with vitamin C (VITAMIN  B COMPLEX) tablet Take 1 tablet by mouth daily 100 tablet 3     vitamin E (VITAMIN E COMPLEX) 1000 units (450 mg) capsule Take 1 capsule (1,000 Units) by mouth daily 100 capsule 3     warfarin (COUMADIN) 5 MG tablet Take  12.5 mg by mouth  1x/week, Take 10 mg by mouth 6x per week 75 tablet 11       Objective:   There were no vitals taken for this visit.   General: Patient is well groomed, appears stated age, is alert, cooperative and in no acute distress.  Skin:  Hyperkeratotic tissue build up with underlying ecchymosis to distal 2nd and 3rd left digits. No open lesion upon debridement. One open lesion present    Wound #1  A diabetic wound is noted at right  distal hallux measuring 0.8 cm x 0.9 cm x 0.1 cm. Nontender to palpation.  Sosa Classification: 2  Tissue Depth: subcutaneous  Wound base: pink/Granulation  Edges: Hyperkeratotic  Drainage: scant/serous and serosanguinous  Odor: No   Undermining: No  Tunneling: No  Bone Exposure: No  Clinical Signs of Infection: No      7/10/19      7/24/19    Wound #2  Hyperkeratotic tissue present to L distal second digit was debrided today, no underlying lesion present but there is ecchymosis within dermis below callus.     Lab Results   Component Value Date    A1C 6.1 02/26/2019    A1C 5.7 08/28/2018     Assessment:   - Diabetic ulcer of right hallux      Plan:   - Wound present to R hallux, no signs of infection today.   - Devitalized tissue of wound was excisionally debrided with #15 blade to level of subcutaneous tissue at the deepest. Healthy bleeding occurred, no anesthesia necessary. Silver nitrate used x 1 to L 2nd digit to successfully control bleeding.   - Keep 2nd digit covered with band-aid until later today, then okay to keep uncovered. Recommend toe sleeve only if able to remove nightly.  - Wound care instructions R hall, home nursing to perform 3 times per week: 1. Spray with wound cleanser, pat dry 2. Apply silvcercel to wound base 3. Apply mepilex (cut in half).   - Activity: Recommend NWB to R foot as much as possible and in open toed or off-loading shoe to right foot at all times except bed.   - RTC 2 weeks    Again, thank you for allowing me to participate in the care  of your patient.      Sincerely,    Paula Emery PA-C

## 2019-07-30 ENCOUNTER — ANCILLARY PROCEDURE (OUTPATIENT)
Dept: MRI IMAGING | Facility: CLINIC | Age: 69
End: 2019-07-30
Attending: PODIATRIST
Payer: COMMERCIAL

## 2019-07-30 ENCOUNTER — OFFICE VISIT (OUTPATIENT)
Dept: DERMATOLOGY | Facility: CLINIC | Age: 69
End: 2019-07-30
Payer: COMMERCIAL

## 2019-07-30 DIAGNOSIS — M86.671 OTHER CHRONIC OSTEOMYELITIS OF RIGHT FOOT (H): ICD-10-CM

## 2019-07-30 DIAGNOSIS — L97.511 DIABETIC ULCER OF TOE OF RIGHT FOOT ASSOCIATED WITH TYPE 2 DIABETES MELLITUS, LIMITED TO BREAKDOWN OF SKIN (H): ICD-10-CM

## 2019-07-30 DIAGNOSIS — C44.319 BASAL CELL CARCINOMA OF LEFT FOREHEAD: Primary | ICD-10-CM

## 2019-07-30 DIAGNOSIS — E11.621 DIABETIC ULCER OF TOE OF RIGHT FOOT ASSOCIATED WITH TYPE 2 DIABETES MELLITUS, LIMITED TO BREAKDOWN OF SKIN (H): ICD-10-CM

## 2019-07-30 DIAGNOSIS — E11.65 TYPE 2 DIABETES MELLITUS WITH HYPERGLYCEMIA, WITHOUT LONG-TERM CURRENT USE OF INSULIN (H): ICD-10-CM

## 2019-07-30 LAB — RADIOLOGIST FLAGS: NORMAL

## 2019-07-30 RX ORDER — GADOBUTROL 604.72 MG/ML
15 INJECTION INTRAVENOUS ONCE
Status: COMPLETED | OUTPATIENT
Start: 2019-07-30 | End: 2019-07-30

## 2019-07-30 RX ADMIN — GADOBUTROL 15 ML: 604.72 INJECTION INTRAVENOUS at 14:25

## 2019-07-30 ASSESSMENT — PAIN SCALES - GENERAL: PAINLEVEL: NO PAIN (0)

## 2019-07-30 NOTE — DISCHARGE INSTRUCTIONS
MRI Contrast Discharge Instructions    The IV contrast you received today will pass out of your body in your  urine. This will happen in the next 24 hours. You will not feel this process.  Your urine will not change color.    Drink at least 4 extra glasses of water or juice today (unless your doctor  has restricted your fluids). This reduces the stress on your kidneys.  You may take your regular medicines.    If you are on dialysis: It is best to have dialysis today.    If you have a reaction: Most reactions happen right away. If you have  any new symptoms after leaving the hospital (such as hives or swelling),  call your hospital at the correct number below. Or call your family doctor.  If you have breathing distress or wheezing, call 911.    Special instructions: ***    I have read and understand the above information.    Signature:______________________________________ Date:___________    Staff:__________________________________________ Date:___________     Time:__________    [place] Radiology Departments:    ___[place]: [phone]  ___[facility]: [phone]  ___[place]: [phone] ___[facility]: [phone]  ___[facility]: [phone]  ___[facility]: [phone]  ___[phone]  ___[facility]: [phone]  ___[phone]

## 2019-07-30 NOTE — PROGRESS NOTES
[facility] Dermatology Post-operative Visit note:  Date: July 30, 2019   Dermatology Surgery Clinic  [facility]  Clinics and Surgery Center  [address] [place] [address]  Surgeon:  Pieter Wilkinson MD  The patient presents for the follow up from their previous surgery Details are below.     Original Procedure Date: 07/09/2019   Reason for visit: MMS for BCC   Bleeding that required medical attention: none   Infection: none   Hospitalization for procedure within 30 days: none   Are you having any functional problems since the surgery: NA     Case(s) Specific Information:  Procedure Location: L forehead  Type of Repair: advancement with Burrow's graft  Is patient happy with appearance of scar: NA  On 1-10 scale, with 10 being how the area looked before the surgery and 1 being the worst imaginable scar, how do you think it looks today?: NA    Assessment and Plan: Upon review, 80% of the surgical site is fully healed with no problems. The inferior portion had fibrinous materal and epidermal slough. We will have the pt continue wound care on the inferior portion.    Instructed to continue daily dressing changes and application of petroleum jelly until healed over with new pink skin and all sutures are dissolved.     Recommended sunscreens SPF #50 or greater and protective clothing. Risk of scar dyspigmentation discussed.     Continue periodic self skin exams and report of any new or changing lesions.     Please refer to previous clinic note for details regarding treatment.  Follow up in 2 weeks.    Staff Involved:    Scribe Disclosure  I, Ryan Patel, am serving as a scribe to document services personally performed by Dr. Pieter Wilkinson, based on data collection and the provider's statements to me.     Attending Attestation  I attest that the Scribe recorded the interview and exam that I personally performed.  I have reviewed the note and edited it as necessary.    Pieter  LAMONT Wilkinson M.D.  Professor  Director of Dermatologic Surgery  Department of Dermatology  [facility]

## 2019-07-30 NOTE — NURSING NOTE
Chief Complaint   Patient presents with     Derm Problem     Clarke is here today for 2 week graft check, patient has no complaints.     Brit Pichardo LPN

## 2019-07-30 NOTE — LETTER
7/30/2019       RE: Clarke Moreira  [address]  [facility] [address]     Dear Colleague,    Thank you for referring your patient, Clarke Moreira, to the [facility] DERMATOLOGIC SURGERY at [facility]. Please see a copy of my visit note below.    [facility] Dermatology Post-operative Visit note:  Date: July 30, 2019   Dermatology Surgery Clinic  [facility] and Surgery Center  [address] [facility], [place] [address]  Surgeon:  Pieter Wilkinson MD  The patient presents for the follow up from their previous surgery Details are below.     Original Procedure Date: 07/09/2019   Reason for visit: MMS for BCC   Bleeding that required medical attention: none   Infection: none   Hospitalization for procedure within 30 days: none   Are you having any functional problems since the surgery: NA     Case(s) Specific Information:  Procedure Location: L forehead  Type of Repair: advancement with Burrow's graft  Is patient happy with appearance of scar: NA  On 1-10 scale, with 10 being how the area looked before the surgery and 1 being the worst imaginable scar, how do you think it looks today?: NA    Assessment and Plan: Upon review, 80% of the surgical site is fully healed with no problems. The inferior portion had fibrinous materal and epidermal slough. We will have the pt continue wound care on the inferior portion.    Instructed to continue daily dressing changes and application of petroleum jelly until healed over with new pink skin and all sutures are dissolved.     Recommended sunscreens SPF #50 or greater and protective clothing. Risk of scar dyspigmentation discussed.     Continue periodic self skin exams and report of any new or changing lesions.     Please refer to previous clinic note for details regarding treatment.  Follow up in 2 weeks.    Staff Involved:    Scribe Disclosure  I, Ryan Patel, am serving as a  scribe to document services personally performed by Dr. Pieter Wilkinson, based on data collection and the provider's statements to me.     Attending Attestation  I attest that the Scribe recorded the interview and exam that I personally performed.  I have reviewed the note and edited it as necessary.    Pieter Wilkinson M.D.  Professor  Director of Dermatologic Surgery  Department of Dermatology  [facility]

## 2019-07-31 ENCOUNTER — TELEPHONE (OUTPATIENT)
Dept: PODIATRY | Facility: CLINIC | Age: 69
End: 2019-07-31

## 2019-07-31 NOTE — TELEPHONE ENCOUNTER
MRI results noted. I have called and talked with Clarke. Please refer to the result note for details of the discussion and the plan.  Copied and pasted below.     Thank you.    Dr. Gómez        Notes recorded by Clarke Gómez DPM on 7/31/2019 at 12:14 PM CDT  Results noted. I called and spoke with Clarke. I explained that my recommendation is partial toe amputation.  He understands.  We discussed admission to the hospital for more timely surgery versus managing it as an outpatient in same day surgery.   The latter likely won't happen for 1-2 weeks.  His toe has remained stable. He said that he does not want to be admitted at this time. I explained that I am out of town until next Tuesday.  He wants to resume the discussion upon my return. He assured me that he will present to the ED, if his toe deteriorates.  I reviewed the clinical signs of infection.  I will forward to Dr. Bazan who will be on call the next four days.     Clarke Gómez DPM, ANITA, MS [place] Department of Podiatry/Foot & Ankle Surgery

## 2019-07-31 NOTE — TELEPHONE ENCOUNTER
Returned missed call to radiology.   MRI of right foot on 7/30/19-- Finding most consistent with osteomyelitis of the great toe.      Results can be found below:     Exam: MRI of the right great toe dated 7/30/2019.     COMPARISON: Radiographs dated 5/16/2019.     CLINICAL HISTORY: Diabetic with suspected osteomyelitis of the right  great toe.     TECHNIQUE: Multiplanar, multisequence MR imaging of the right great  toe was obtained using standard sequences in 3 orthogonal planes  before and after the uneventful administration of intravenous  gadolinium contrast.     Contrast: 15mL Gadavist     FINDINGS:     Erosive changes involving the distal tuft of the right great toe  proximal phalanx adjacent to a soft tissue defect. There is low T1  signal, increased T2 signal, with enhancement in the adjacent distal  phalanx, findings consistent with osteomyelitis on MRI, correlate with  clinical exam and laboratory values. No other marrow signal  abnormalities in the visualized right forefoot or midfoot. Small  amount of fluid at the right great toe interphalangeal joint.     No enhancing soft tissue mass to suggest abscess. Mild enhancement  along the dorsal aspect of the right great toe distal phalanx, likely  reactive.     The Lisfranc ligament is intact.     Atrophy within the musculature, presumed to be due to the patient's  history of diabetes. Low T1 signal in both sesamoids, nonspecific.     Artifact along the plantar aspect of the forefoot, adjacent to the  distal aspect of the right second metatarsal.                                                                      IMPRESSION:  1. Erosive changes involving the distal tuft of the right great toe  distal phalanx with adjacent low T1 signal, bone marrow edema, and  enhancement, MRI findings most consistent with osteomyelitis.  2. Atrophy within the musculature, consistent with patient's history  of diabetes.     [Consider Follow Up: MRI findings most consistent  with osteomyelitis  of the right great toe distal phalanx.]     This report will be copied to the [facility] to ensure a  provider acknowledges the finding.      MD Veronica LAUREN, ATC

## 2019-07-31 NOTE — TELEPHONE ENCOUNTER
Reason for Call:  Other call back    Detailed comments: Jossy is calling from [facility] to report a incidental finding, no RN available to take the call. Please call her back, thanks!    Phone Number Patient can be reached at: Other phone number:  [phone]*    Best Time: anytime    Can we leave a detailed message on this number? YES    Call taken on 7/31/2019 at 11:25 AM by Vielka Whiteside

## 2019-08-01 ENCOUNTER — TELEPHONE (OUTPATIENT)
Dept: FAMILY MEDICINE | Facility: CLINIC | Age: 69
End: 2019-08-01

## 2019-08-01 NOTE — TELEPHONE ENCOUNTER
Routing to PCP to please contact patient regarding partial foot amputation recommendation from orthopedic surgeon.  Kyra Jackson RN

## 2019-08-01 NOTE — TELEPHONE ENCOUNTER
[facility] Family Medicine phone call message- general phone call: Patient calling in to give a message saying that orthopedic surgeon called him to say that a part of his foot needs to be amputate So he wanted to consult on the bases of this. He has an appointment with Dr. Buenrostro on August 12 th. Tried making appointment prior to this date but there is no open appointment. So is requesting to call back.    Reason for call: Call back    Action Desired: Call back    Return call needed: Yes    OK to leave a message on voice mail? Yes    Advised patient response may take up to 2 business days: Yes    Primary language: English      needed? No    Call taken on August 1, 2019 at 8:36 AM by Monse Christy    Route to [facility] TRIAGE

## 2019-08-05 NOTE — TELEPHONE ENCOUNTER
Discussed multiple issues  1. Toe / osteomylelitis  Will discuss with Hannah for second opinion  Has developed questions for Dr Gómez.    2. Basal Cell  Healing    3. DM  Blood sugars higher recently (130-150)    Will discuss at upcoming appt    [place]

## 2019-08-06 ENCOUNTER — ANCILLARY PROCEDURE (OUTPATIENT)
Dept: GENERAL RADIOLOGY | Facility: CLINIC | Age: 69
End: 2019-08-06
Attending: PODIATRIST
Payer: COMMERCIAL

## 2019-08-06 ENCOUNTER — OFFICE VISIT (OUTPATIENT)
Dept: ORTHOPEDICS | Facility: CLINIC | Age: 69
End: 2019-08-06
Payer: COMMERCIAL

## 2019-08-06 DIAGNOSIS — E11.621 DIABETIC ULCER OF TOE OF RIGHT FOOT ASSOCIATED WITH TYPE 2 DIABETES MELLITUS, WITH FAT LAYER EXPOSED (H): Primary | ICD-10-CM

## 2019-08-06 DIAGNOSIS — M86.671 OTHER CHRONIC OSTEOMYELITIS OF RIGHT FOOT (H): ICD-10-CM

## 2019-08-06 DIAGNOSIS — E11.621 DIABETIC ULCER OF TOE OF RIGHT FOOT ASSOCIATED WITH TYPE 2 DIABETES MELLITUS, WITH FAT LAYER EXPOSED (H): ICD-10-CM

## 2019-08-06 DIAGNOSIS — L97.512 DIABETIC ULCER OF TOE OF RIGHT FOOT ASSOCIATED WITH TYPE 2 DIABETES MELLITUS, WITH FAT LAYER EXPOSED (H): ICD-10-CM

## 2019-08-06 DIAGNOSIS — L97.512 DIABETIC ULCER OF TOE OF RIGHT FOOT ASSOCIATED WITH TYPE 2 DIABETES MELLITUS, WITH FAT LAYER EXPOSED (H): Primary | ICD-10-CM

## 2019-08-06 LAB
GRAM STN SPEC: ABNORMAL
GRAM STN SPEC: ABNORMAL
Lab: ABNORMAL
SPECIMEN SOURCE: ABNORMAL

## 2019-08-06 NOTE — PROGRESS NOTES
Chief Complaint   Patient presents with     Right Great Toe - WOUND CARE     Patient stated that he seen Dr. Peralta and he suggested surgery for the right hallux. Patient is wanting a 2nd opinion from Dr. Lagos.      Left 2nd Toe - Follow Up          No Known Allergies      Subjective: Clarke is a 69 year old male who presents to the clinic today for a follow up of right distal hallux ulceration.  He has been seeing Dr. Gómez at [facility] and Paula Emery PA-C here at the [place].  His last visit with me was in April.  At that time, I noted that the right distal hallux wound healed.  Since then, the wound has opened.  He relates that he was told that he should get an amputation of this distal aspect of the toe as it did show osteomyelitis on MRI and x-ray.  He comes for second opinion today, as he would like to put off the amputation for as long as possible.  He has been on antibiotics on and off, however he has not had a long term antibiotic for this.  He relates no pain to the foot, as he is neuropathic.    ROS: No n/v/d/f/c/ns/sob/cp    Objective        A diabetic pressure wound is noted at right  distal hallux measuring 1cm x 0.5cm x 0.1cm.    Sosa Classification: 2    Wound base: Red/Granulation    Edges: macerated hyperkeratotic tissue    Drainage: slight/serous    Odor: no    Undermining: no    Bone Exposure: Not overtly, however the wound base is slightly boggy.     Clinical Signs of Infection: No    After obtaining patient consent, the wound was irrigated with copious amounts of saline. No sharp debridement indicated today.     right foot xrays indicated in 3 weightbearing views.    Increase in the ostial lysis of the distal aspect of the distal phalanx of the hallux.  No air in the soft tissue noted.  Distraction seems to be local to the distal phalanx.  No other acute processes noted.      Assessment: Clarke is a type II diabetic with peripheral neuropathy and a long-standing wound on the  right hallux that is been open for a few months.  He has been offered amputation of the distal aspect of the toe, however he would like a second opinion on this.  He has not tried an extended course of antibiotics for this toe.  If we can get the toe offloaded, and infectious disease thinks that a long-term course of antibiotics is appropriate, I think that we may have an opportunity to heal this wound.  I discussed with him that he may have complications from it getting amputation.  He does become more likely of getting an annotation on the contralateral side.  He may also start to get pressure underneath the second metatarsal head.    Plan:   - Pt seen and evaluated  -He would like to explore the option of having long-term antibiotics before and amputation.  I think is a reasonable plan to see if infectious disease agrees.  I have written a referral for him to see infectious disease.  I have also taken a culture of the area.  For now, we will start him on some Augmentin.  He does not appear to be acutely infected, nor septic because of this I think it is okay to wait to see what they say.  - Rx for Augmentin.  - XRs taken and discussed with him.   -   - Pt to return to clinic in 2 weeks.

## 2019-08-06 NOTE — LETTER
8/6/2019       RE: Clarke Moreira  [address]  [facility] [address]     Dear Colleague,    Thank you for referring your patient, Clarke Moreira, to the [facility] at [facility]. Please see a copy of my visit note below.    Chief Complaint   Patient presents with     Right Great Toe - WOUND CARE     Patient stated that he seen Dr. Peralta and he suggested surgery for the right hallux. Patient is wanting a 2nd opinion from Dr. Lagos.      Left 2nd Toe - Follow Up        No Known Allergies    Subjective: Clarke is a 69 year old male who presents to the clinic today for a follow up of right distal hallux ulceration.  He has been seeing Dr. Gómez at [facility] and Paula Emery PA-C here at the [facility].  His last visit with me was in April.  At that time, I noted that the right distal hallux wound healed.  Since then, the wound has opened.  He relates that he was told that he should get an amputation of this distal aspect of the toe as it did show osteomyelitis on MRI and x-ray.  He comes for second opinion today, as he would like to put off the amputation for as long as possible.  He has been on antibiotics on and off, however he has not had a long term antibiotic for this.  He relates no pain to the foot, as he is neuropathic.    ROS: No n/v/d/f/c/ns/sob/cp    Objective        A diabetic pressure wound is noted at right  distal hallux measuring 1cm x 0.5cm x 0.1cm.    Sosa Classification: 2    Wound base: Red/Granulation    Edges: macerated hyperkeratotic tissue    Drainage: slight/serous    Odor: no    Undermining: no    Bone Exposure: Not overtly, however the wound base is slightly boggy.     Clinical Signs of Infection: No    After obtaining patient consent, the wound was irrigated with copious amounts of saline. No sharp debridement indicated today.     right foot xrays indicated in 3 weightbearing views.    Increase in the  ostial lysis of the distal aspect of the distal phalanx of the hallux.  No air in the soft tissue noted.  Distraction seems to be local to the distal phalanx.  No other acute processes noted.    Assessment: Clarke is a type II diabetic with peripheral neuropathy and a long-standing wound on the right hallux that is been open for a few months.  He has been offered amputation of the distal aspect of the toe, however he would like a second opinion on this.  He has not tried an extended course of antibiotics for this toe.  If we can get the toe offloaded, and infectious disease thinks that a long-term course of antibiotics is appropriate, I think that we may have an opportunity to heal this wound.  I discussed with him that he may have complications from it getting amputation.  He does become more likely of getting an annotation on the contralateral side.  He may also start to get pressure underneath the second metatarsal head.    Plan:   - Pt seen and evaluated  -He would like to explore the option of having long-term antibiotics before and amputation.  I think is a reasonable plan to see if infectious disease agrees.  I have written a referral for him to see infectious disease.  I have also taken a culture of the area.  For now, we will start him on some Augmentin.  He does not appear to be acutely infected, nor septic because of this I think it is okay to wait to see what they say.  - Rx for Augmentin.  - XRs taken and discussed with him.   -   - Pt to return to clinic in 2 weeks.     Again, thank you for allowing me to participate in the care of your patient.      Sincerely,    Jesús Lagos DPM

## 2019-08-06 NOTE — NURSING NOTE
Reason For Visit:   Chief Complaint   Patient presents with     Right Great Toe - WOUND CARE     Patient stated that he seen Dr. Peralta and he suggested surgery for the right hallux. Patient is wanting a 2nd opinion from Dr. Lagos.      Left 2nd Toe - Follow Up       Pain Assessment  Patient Currently in Pain: Denies        No Known Allergies        Whitley Munoz LPN

## 2019-08-08 ENCOUNTER — TELEPHONE (OUTPATIENT)
Dept: PODIATRY | Facility: CLINIC | Age: 69
End: 2019-08-08

## 2019-08-08 NOTE — TELEPHONE ENCOUNTER
Patient called back wanting to inform Dr Gómez he consulted another physician and decided to try an antibiotic before scheduling surgery     Patient reports he started the antibiotic 2 days ago

## 2019-08-08 NOTE — TELEPHONE ENCOUNTER
I attempted to call Clarke again today. No answer. I left a voicemail reminding him that I am concerned about bone infection in his right great toe and that I recommend surgery  We should consider doing this soon, either as an inpatient, or in same day surgery (if his toe remains stable).     I will ask my office to try to contact him again later today.     Clarke Gómez DPM, FACFAS, MS    [facility]

## 2019-08-11 LAB
BACTERIA SPEC CULT: ABNORMAL
Lab: ABNORMAL
SPECIMEN SOURCE: ABNORMAL

## 2019-08-12 ENCOUNTER — OFFICE VISIT (OUTPATIENT)
Dept: PSYCHOLOGY | Facility: CLINIC | Age: 69
End: 2019-08-12
Payer: COMMERCIAL

## 2019-08-12 DIAGNOSIS — F33.1 MODERATE EPISODE OF RECURRENT MAJOR DEPRESSIVE DISORDER (H): Primary | ICD-10-CM

## 2019-08-12 DIAGNOSIS — F41.1 GAD (GENERALIZED ANXIETY DISORDER): ICD-10-CM

## 2019-08-12 NOTE — PATIENT INSTRUCTIONS
Treatment Plan    Client's Legal Name: Clarke Moreira    Client's Preferred Name:Clarke  YOB: 1950  Today's Date: August 12, 2019    Date Diagnostic Update Due: 10/15/19    DSM-V Diagnoses:  Major Depression, recurrent, moderate  Generalized Anxiety Disorder  R/o persistent depressive disorder    Psychosocial / Contextual Factors:   The patient's mental health concerns have been continuing to affect his ability to function at home and medically and have been causing clinically significant distress.    PHQ-9 SCORE 10/22/2018 12/11/2018 5/10/2019   PHQ-9 Total Score - - -   PHQ-9 Total Score 21 25 22     CHRIS-7 SCORE 10/22/2018 12/11/2018 5/10/2019   Total Score - - -   Total Score 13 20 13       Collaboration: Matthias Sanchez    Referral: not eneded at [facility]    Anticipated treatment duration: Unknown  Agreed upon meeting frequency: every other week    Long Term Treatment Goal(s) related to diagnosis / functional impairment(s)  Goal 1: Clarke will practice a healthy daily discipline of diet and exercise.    Intervention(s)  Therapist will provide support, psychoeducation and homework assignments as needed.    Goal 2: Clarke will make and keep appointments with appropriate time limits to reduce disabling anxiety about leaving the house.     Intervention(s)  Therapist will provide support, psychoeducation and homework assignments as needed.    If you need additional support and care during times that your therapist or PCP are not available, here are some additional resources for you:    Help in a Crisis:    COPE ([facility] Mobile Response Team):  [phone]   [facility] Multilingual Crisis Line:  [phone]    [facility]: [phone]    (Emergency Psychiatric Evaluation)    [place] - [facility] [place]  24 hour crisis walk-in and crisis line  [address] [place], [place]  [phone]    Crisis Text Line: Text [place] to 573382. Free support at your fingertips 24/7  People  who text [place] to 331654 will be connected with a counselor. Crisis Text Line is available 24 hours a day, seven days a week.    If you feel at risk of immediate harm, go directly to the Emergency Department.    Patient has reviewed and agreed to the above plan.    Lety Buenrostro PsyD  August 12, 2019      ______________________________    ________  Patient Signature       Date    ______________________________    ________  Provider Signature       Date

## 2019-08-12 NOTE — PROGRESS NOTES
"Behavioral Health Progress Note    Client Legal Name: Clarke Moreira   Client Preferred Name: Clarke   Service Type: Individual  Length of Visit: 45 minutes  Attendees: patient     Identifying Information and Presenting Problem:    The patient is a 68 year old American male who is being seen for problematic symptoms of depression, ongoing adjustment to medical illness, as well as social isolation.  Clarke has continued to struggle more with his mood with the recent developments with his health.      Treatment Objective(s) Addressed in This Session:                Clarke will practice a healthy daily discipline of diet and exercise.    Clarke will make and keep appointments with appropriate time limits to reduce disabling anxiety about leaving the house.     Progress on / Status of Treatment Objective(s) / Homework:  Reports feeling \"despairing\"     PHQ-9 SCORE 10/22/2018 12/11/2018 5/10/2019   PHQ-9 Total Score - - -   PHQ-9 Total Score 21 25 22       CHRIS-7 SCORE 10/22/2018 12/11/2018 5/10/2019   Total Score - - -   Total Score 13 20 13     Topics Discussed/Interventions Provided:    Clarke continues to process his interactions with the medical system and what this means about how others value him, as well as how he values himself.  He had some doctor appointments and received some information.  Called here to talk to me, but the message was not clear (included a couple of different questions) so it was routed to Dr. Sanchez who responded to Clarke.  Apologized that I did not respond since the message had not been routed to me.  Clarke is feeling distrustful of the health system and unsure who's opinions he should believe.  He went into an appointment at the podiatrist and through a series of events that included him speaking up and sharing his thoughts with a nurse at the office, reports feeling heard and understood by this physician.  This helped him to figure out what his next steps forward should be.  " Recognizes that he often comes from a place of assuming others don't care about him because he has a hard time caring about himself.  Clarke describes a narrative where he sees himself constantly in the same place and not making progress.  We reflected on a few instances in the past 6 months where he did take action on his own behalf to meet his needs.  Clarke knows these instances are there, but quickly dismisses them as not indicating progress is being made.      Discussed treatment goals today and completed treatment plan.  See AVS    Assessment: The patient appeared to be active and engaged in today's session and was receptive to feedback.    Mental Status: Clarke appeared alert and oriented. He was casually dressed and groomed appropriately.  Eye contact was good.  Speech was normal rate and rhythm.  Clarke uses a walker for mobility and he appears to be having a harder time getting around recently when I observe him leaving the exam room.  Mood was described as down.  Affect appeared less down than previous visit.  Thought processes were logical and coherent.  Thought content was WNL with no evidence of psychotic or paranoid features. No evidence of SI/HI or self-harm, intent, or plans. Memory appeared grossly intact. Insight and judgment appeared good and patient exhibited good impulse control during the appointment.     Does the patient appear to be at imminent risk of harm to self/others at this time? No    The session was necessary to identify and challenge unhelpful thinking that occurs regarding his perception of himself and reinforced his ability to reframe these thoughts.  Symptoms of depression and anxiety have continued to interfere with his ability to function at home and cause distress that can interfere with his ability to care for himself.  Ongoing psychotherapy is necessary to provide counseling and provide support.    Diagnosis (DSM-5):  Major Depression, recurrent, moderate  Generalized  Anxiety Disorder  R/o persistent depressive disorder    Plan:  1. Return in two weeks.  2. tx plan completed    NOTE: Treatment plan updated needed on 8/12/20.  Diagnostic assessment update due 10/15/19.

## 2019-08-15 ENCOUNTER — TELEPHONE (OUTPATIENT)
Dept: FAMILY MEDICINE | Facility: CLINIC | Age: 69
End: 2019-08-15

## 2019-08-15 NOTE — TELEPHONE ENCOUNTER
Returned call to home care nurse to give verbal orders for skilled nursing  per protocol as requested. Nurse did not verbalize understanding. Verbal orders requested repeat -specifics, Repeated that per standing protocol-Approving skilled nursing as requested as needed per Standing protocol.    Message routed to PCP as Fyi-only.    Bladimir Campbell RN

## 2019-08-15 NOTE — TELEPHONE ENCOUNTER
[facility] phone call message - order or referral request from patient:     Order or referral being requested: Requested order Skilled nursing   3 times a week for 8 weeks   2 times a week for 1 week   3 PRN    Additional Details:     Referral only -Specialty  Location     OK to leave a message on voice mail? Yes    Primary language: English      needed? No    Call taken on August 15, 2019 at 11:02 AM by Amanda Dumont    Order request route to [facility] TRIAGE   Referrals Route to [facility] (Green/Walsh/Purple) CARE COORDINATOR

## 2019-08-19 ENCOUNTER — MEDICAL CORRESPONDENCE (OUTPATIENT)
Dept: HEALTH INFORMATION MANAGEMENT | Facility: CLINIC | Age: 69
End: 2019-08-19

## 2019-08-20 ENCOUNTER — OFFICE VISIT (OUTPATIENT)
Dept: ORTHOPEDICS | Facility: CLINIC | Age: 69
End: 2019-08-20
Payer: COMMERCIAL

## 2019-08-20 DIAGNOSIS — E11.621 DIABETIC ULCER OF TOE OF RIGHT FOOT ASSOCIATED WITH TYPE 2 DIABETES MELLITUS, WITH FAT LAYER EXPOSED (H): Primary | ICD-10-CM

## 2019-08-20 DIAGNOSIS — M86.671 OTHER CHRONIC OSTEOMYELITIS OF RIGHT FOOT (H): ICD-10-CM

## 2019-08-20 DIAGNOSIS — L97.512 DIABETIC ULCER OF TOE OF RIGHT FOOT ASSOCIATED WITH TYPE 2 DIABETES MELLITUS, WITH FAT LAYER EXPOSED (H): Primary | ICD-10-CM

## 2019-08-20 NOTE — LETTER
8/20/2019       RE: Clarke Moreira  [address]  [facility] [address]     Dear Colleague,    Thank you for referring your patient, Clarke Moreira, to the [facility]. Please see a copy of my visit note below.    Chief Complaint:   Chief Complaint   Patient presents with     Right Great Toe - WOUND CARE     Left Foot - WOUND CARE      No Known Allergies    Subjective: Clarke is a 69 year old male who presents to the clinic today for a follow up of right hallux ulcer. Relates that he finished the abx yesterday. He is getting home nursing who is changing the dressing MWF. He is wearing his diabetic shoes today He has not seen ID. Relates that he does have some pain to the toe. Has some callus formation noted to the left 2nd and 3rd digits.     Objective          A diabetic wound is noted at right  distal hallux measuring 0.8cm x 0.6cm x 0.1cm    Sosa Classification: 2    Wound base: Red/Granulation    Edges: slightly hyperkeratotic    Drainage: small/serous    Odor: no    Undermining: no    Bone Exposure: No    Clinical Signs of Infection: No    After obtaining patient consent, the wound was irrigated with copious amounts of saline. A scalpel and curette were then used to debride the wound into the subcutaneous tissue. The wound edges were debrided to healthy, bleeding tissue. Given the patient's lack of sensation, no anesthesia was necessary for the procedure.   Hyperkeratotic lesions noted to the left distal 2nd and 3rd digits with intrasubstance bleeding noted. No open lesion noted to the 2nd digit. Small open lesion noted to the 3rd distal digit.     Assessment: DM2 with neuropathy.   Right hallux ulceration - slightly smaller.   Left digits 2nd and 3rd tylomas.    Plan:   - Pt seen and evaluated  - Wound debrided as described.  - Start Iodofoam and Optifoam on the ulceration. Wound orders written. Bandaid to the left 3rd  digit.  - DH2 shoe dispensed for the right foot.  - Pt to return to clinic in 2 weeks.    Again, thank you for allowing me to participate in the care of your patient.      Sincerely,    Jesús Lagos DPM

## 2019-08-20 NOTE — NURSING NOTE
Reason For Visit:   Chief Complaint   Patient presents with     Right Great Toe - WOUND CARE     Left Foot - WOUND CARE       Pain Assessment  Patient Currently in Pain: Other (Comment)(Patient stated that he does not have pain today but he did over the weekend. )  Primary Pain Location: (Right great toe. )        No Known Allergies        Whitley Munoz LPN

## 2019-08-20 NOTE — PROGRESS NOTES
Chief Complaint:   Chief Complaint   Patient presents with     Right Great Toe - WOUND CARE     Left Foot - WOUND CARE        No Known Allergies      Subjective: Clarke is a 69 year old male who presents to the clinic today for a follow up of right hallux ulcer. Relates that he finished the abx yesterday. He is getting home nursing who is changing the dressing MWF. He is wearing his diabetic shoes today He has not seen ID. Relates that he does have some pain to the toe. Has some callus formation noted to the left 2nd and 3rd digits.     Objective          A diabetic wound is noted at right  distal hallux measuring 0.8cm x 0.6cm x 0.1cm    Sosa Classification: 2    Wound base: Red/Granulation    Edges: slightly hyperkeratotic    Drainage: small/serous    Odor: no    Undermining: no    Bone Exposure: No    Clinical Signs of Infection: No    After obtaining patient consent, the wound was irrigated with copious amounts of saline. A scalpel and curette were then used to debride the wound into the subcutaneous tissue. The wound edges were debrided to healthy, bleeding tissue. Given the patient's lack of sensation, no anesthesia was necessary for the procedure.   Hyperkeratotic lesions noted to the left distal 2nd and 3rd digits with intrasubstance bleeding noted. No open lesion noted to the 2nd digit. Small open lesion noted to the 3rd distal digit.     Assessment: DM2 with neuropathy.   Right hallux ulceration - slightly smaller.   Left digits 2nd and 3rd tylomas.        Plan:   - Pt seen and evaluated  - Wound debrided as described.  - Start Iodofoam and Optifoam on the ulceration. Wound orders written. Bandaid to the left 3rd digit.  - DH2 shoe dispensed for the right foot.  - Pt to return to clinic in 2 weeks.

## 2019-08-21 NOTE — TELEPHONE ENCOUNTER
RECORDS RECEIVED FROM: Internal    DATE RECEIVED: 9/18/2019    NOTES (Gather within 2 years) STATUS DETAILS   OFFICE NOTE from referring provider   Internal [facility]   OFFICE NOTE from other specialist N/A    DISCHARGE SUMMARY from hospital N/A    DISCHARGE REPORT from the ER N/A    LABS (any labs) Internal    MEDICATION LIST Internal    IMAGING  (NEED IMAGES AND REPORTS)     Osteomyelitis: Foot imaging  Internal Photo in Office Visit with  8/20/2019   And additional IMG in PACS.  XR Foot 8/6/2019   MR Foot 7/30/2019   XR Toe Right 5/16/2019 etc...   Liver Abscess: Abdominal imaging N/A    Other (anything related to diagnoses N/A

## 2019-08-24 NOTE — PROGRESS NOTES
Christian Willoughby M.D.  Cardiovascular Medicine    I personally saw and examined this patient, discussed care with housestaff and other consultants, reviewed current laboratories and imaging studies, and conveyed impression and diagnostic/therapeutic plan to patient.    Problem List  1, Chronic systolic heart failure  2. Morbid obesity  3. Sleep disordered breathing  4. Atrial fibrillation  5. Hyperlipidemia  6. Multi-faceted shortness of breath  7. Anxiety/depression  8. Type II DM  9. Warfarin anticoagulation  10. Allergies: none  11. Lymphedema  12. Hypertension  13. Sepsis  14. Diabetic neuropathy    Referring:     Matthias Sanchez M.D.  [facility] - [facility]    History    69 year old male seen for follow-up evaluation and treatment regarding congestive heart failure in the context of morbid obesity.     He has has history  episodes of pre-syncope, faintness which are sometimes related to orthostatic stimuli such as position change or may occur post prandially.  The dureation is 20-30 seconds.  They do not occur daily.  They seem to occur in clusters interrupted by periods with no occurrences.  The often occur immediately following breakfast.  The patient has no palpitations, head trauma.  His lisinopril has been reduced from 10 to 5, and spironolactone also reduced.  He is on Flomax which he takes in evening.  Sleep study pending. TSH is within normal limits.    Patient lives alone, follows low sodium diet.  He is on 2L fluid restriction that he acknowledges that he is not following.  He has gained 25 pounds with uncontrolled eating and drinking.    Wt Readings from Last 24 Encounters:   08/27/19 (!) 178.8 kg (394 lb 3.2 oz)   06/13/19 (!) 173.9 kg (383 lb 6.4 oz)   05/20/19 (!) 173.9 kg (383 lb 6.4 oz)   05/16/19 (!) 174.5 kg (384 lb 12.8 oz)   05/10/19 (!) 174.5 kg (384 lb 12.8 oz)   04/17/19 (!) 172.7 kg (380 lb 12.8 oz)   04/02/19 (!) 168.9 kg (372 lb 6.4 oz)   04/02/19 (!) 170 kg (374 lb  12.8 oz)   03/20/19 (!) 161.8 kg (356 lb 11.3 oz)   02/19/19 (!) 175.4 kg (386 lb 9.6 oz)   12/12/18 (!) 175.1 kg (386 lb)   12/11/18 (!) 176.3 kg (388 lb 9.6 oz)   10/30/18 (!) 175.7 kg (387 lb 6.4 oz)   10/22/18 (!) 175.5 kg (387 lb)   10/10/18 (!) 173.3 kg (382 lb)   09/07/18 (!) 167.8 kg (369 lb 14.4 oz)   08/28/18 (!) 167.8 kg (370 lb)   07/18/18 (!) 170.1 kg (375 lb)   06/18/18 (!) 170.4 kg (375 lb 9.6 oz)   06/15/18 (!) 171.5 kg (378 lb 1.6 oz)   06/08/18 (!) 167.8 kg (369 lb 14.9 oz)   05/21/18 (!) 174.9 kg (385 lb 9.6 oz)   04/27/18 (!) 173.7 kg (383 lb)   04/26/18 (!) 173.7 kg (383 lb)       Occupational: middle management    Longstanding depression    Family: orphan    Allergy: none    Surgery: broken bones    Has bariatric bed and chair - need maintenance    REVIEW of SYMPTOMS    Constitutional: without fever, chills, night sweats.  Weight is up 7 pounds over last several months  HEENT: without dry eyes, dry mouth, sinusitis, corryza, visual changes  Endocrine: without polyuria, polydypsia, polyphagia, heat or cold intolerance, changing mental status. Hemoglobin A!C modestly elevated  Cardiology: without chest pain, tightness, heaviness, pressure, paroxysmal dyspnea, orthopnea, palpitation, pre-syncope or syncope or device discharge (if present)  Pulmonary: without asthma, wheezing, cough, hemoptysis  GI: without nausea, emesis, jaundice, pain, hematemesis, melena  : without frequency, urgency, hematuria, stones, pain, abnormal bleeding, frequency, urgency but history of urinary retention requiring catherization   Neurologic: without TIA, CVA, trauma, seizure  Dermatologic: without lesions, abrasion rash,   Orthopedic/Rheum: without significant joint pain, impairment, limb, polyserositis, ulceration, Raynauds  Heme: without mass, bruising, frequent infection, anemia  Psychiatric: without substance abuse, hallucination, medication, depression    Exercise tolerance: reduced, walks with walker, home  physical therapy    Objective  Constitutional: alert, oriented, normal abnormal  Abnormal gait. and station, normal mentation.  Oral: moist mucous membranes  Lymph: without pathologic adenopathy  Chest: clear to ausculation and percussion save for basilar rales  Cor: No evidence of left or right ventricular activity.  Rhythm isiregular.  S1 variablel, S2 split physiologically. Murmurs are not present  Abdomen: without tenderness, rebound, guarding, masses, ascites  Extremities: Chronic lymphedema  Neuro: no focal defects, normal mentation  Skin: without open lesions save for toe  Psych: oriented, verbal, mental status in tact      Meds    Current Outpatient Medications   Medication     ammonium lactate (LAC-HYDRIN) 12 % external cream     amoxicillin-clavulanate (AUGMENTIN) 875-125 MG tablet     Ascorbic Acid (VITAMIN C) POWD     aspirin (ASA) 81 MG tablet     atorvastatin (LIPITOR) 40 MG tablet     bacitracin 500 UNIT/GM external ointment     blood glucose (ONETOUCH ULTRA) test strip     blood glucose monitoring (ONE TOUCH DELICA) lancets     blood glucose monitoring (ONE TOUCH ULTRA MINI) meter device kit     levothyroxine (SYNTHROID/LEVOTHROID) 175 MCG tablet     metFORMIN (GLUCOPHAGE) 500 MG tablet     metoprolol succinate ER (TOPROL-XL) 100 MG 24 hr tablet     multivitamin w/minerals (MULTI-VITAMIN) tablet     naproxen (NAPROSYN) 500 MG tablet     nystatin (MYCOSTATIN) 244117 UNIT/GM external cream     omega 3 1000 MG CAPS     order for DME     order for DME     order for DME     order for DME     polyethylene glycol (MIRALAX/GLYCOLAX) packet     potassium chloride ER (K-DUR/KLOR-CON M) 20 MEQ CR tablet     senna-docusate (SENOKOT-S/PERICOLACE) 8.6-50 MG tablet     spironolactone (ALDACTONE) 25 MG tablet     tamsulosin (FLOMAX) 0.4 MG capsule     torsemide (DEMADEX) 100 MG tablet     traMADol (ULTRAM) 50 MG tablet     vitamin B complex with vitamin C (VITAMIN  B COMPLEX) tablet     vitamin E (VITAMIN E COMPLEX)  1000 units (450 mg) capsule     warfarin (COUMADIN) 5 MG tablet     Current Facility-Administered Medications   Medication     lidocaine 1% with EPINEPHrine 1:100,000 injection 3 mL     Labs    Results for AJIT CALLAHAN (MRN [id number]) as of 10/30/2018 09:22   Ref. Range 8/28/2018 13:58 8/28/2018 16:40 9/9/2018 11:11 10/22/2018 10:09 10/22/2018 12:46 10/30/2018 08:41   Sodium Latest Ref Range: 132.6 - 141.4 mmol/L 136.9   133.5     Potassium Latest Ref Range: 3.3 - 4.5 mmol/dL 4.3   4.0     Chloride Latest Ref Range: 98.0 - 110.0 mmol/L 99.7   98.2     Carbon Dioxide Latest Ref Range: 20.0 - 32.0 mmol/L 29.4   29.0     Urea Nitrogen Latest Ref Range: 7.0 - 21.0 mg/dL 34.5 (H)   23.6 (H)     Creatinine Latest Ref Range: 0.7 - 1.3 mg/dL 1.2   1.3     GFR Estimate Latest Ref Range: >60.0 mL/min/1.7 m2 64.0   58.3 (L)     GFR Estimate If Black Latest Ref Range: >60.0 mL/min/1.7 m2 77.4   70.6     Calcium Latest Ref Range: 8.5 - 10.1 mg/dL 9.4   9.6     Albumin Latest Ref Range: 3.5 - 4.7 mg/dL 4.4        Protein Total Latest Ref Range: 6.8 - 8.8 g/dL 7.6        Bilirubin Total Latest Ref Range: 0.2 - 1.3 mg/dL 0.7        Alkaline Phosphatase Latest Ref Range: 31.7 - 110.7 U/L 81.8        ALT Latest Ref Range: 0.0 - 45.0 U/L 18.0        AST Latest Ref Range: 0.0 - 55.0 U/L 18.0        Hemoglobin A1C Latest Ref Range: 4.1 - 5.7 % 5.7        Cholesterol Latest Ref Range: 0.0 - 200.0 mg/dL 120.3        Cholesterol/HDL Ratio Latest Ref Range: 0.0 - 5.0  2.7        HDL Cholesterol Latest Ref Range: >40.0 mg/dL 44.1        LDL Cholesterol Calculated Latest Ref Range: 0 - 129 mg/dL 55        VLDL Cholesterol Latest Ref Range: 7.0 - 32.0 mg/dL 21.4        N-Terminal Pro Bnp Latest Ref Range: 0 - 125 pg/mL  1231 (H)   1091 (H)    Occult Blood Scn FIT Latest Ref Range: NEG^Negative    Negative      Triglycerides Latest Ref Range: 0.0 - 150.0 mg/dL 107.0        Glucose Latest Ref Range: 70.0 - 99.0 mg'dL 99.4 (H)   114.1 (H)      INR Unknown 2.3   3.5     ECHO COMPLETE WITH OPTISON Unknown      Rpt         Imaging   Name: AJIT CALLAHAN  MRN: [id number]  : 1950  Study Date: 2017 08:49 AM  Age: 67 yrs  Gender: Male  [place] [facility] [place] Study: Pulmonary Hypertension  Ordering Physician: LEWIS BALTAZAR  Performed By: Bernard Snider [facility]     BSA: 3.0 m2  Height: 71 in  Weight: 444 lb  HR: 110  BP: 87/61 mmHg  _____________________________________________________________________________  __        Procedure  Limited Portable Echo Adult. Contrast Lumason. Lumason (NDC #2260-5642-29)  given intravenously. Patient was given 5ml mixture of Lumason. 0 ml wasted.  _____________________________________________________________________________  __        Interpretation Summary  LVEF is probably normal on limited contrasct-enhanced images.  On limited views, RV function is probably mildly reduced.  Pulmonary artery systolic pressure cannot be assessed.  The inferior vena cava is dilated at 3.34 cm without respiratory variability, consistent with increased right atrial pressure. Estimated right atrial pressure is > 15 mmHg.  No pericardial effusion is present.  _____________________________________________________________________________    Exam: XR CHEST PORT 1 VW, 10/4/2017 9:39 AM  Weight around 404     Indication: Follow up CXR on pulmonary edema     Comparison: Chest radiograph 2017.     Findings:   Single AP view of the chest dated degrees. Right upper extremity PICC  in stable position with tip in the mid SVC. The visualized upper  abdomen is unremarkable. Left-sided rib fractures. Soft tissues are  unremarkable. The trachea is midline. The cardiomediastinal silhouette  is within normal limits. Interval resolution of bilateral pleural  effusions and associated bibasilar atelectasis and consolidation. Mild  interval decrease in pulmonary vascular congestion.         Impression:   1. Interval resolution  bilateral pleural effusions and associated  bibasilar atelectasis and/or consolidation.  2. Mild interval decrease in pulmonary vascular congestion.     I have personally reviewed the examination and initial interpretation  and I agree with the findings.    Clinical history: 74 year old man with AF, recovered tachycardia mediated cardiomyopathy and dilated aortic  root on TTE 4.2 cm. MRA for further evaluation.      Comparison MRA: None.      1. The aortic root is mildly dilated, measuring 3.9 cm at the level of sinus of Valsalva.     2. The ascending aorta measures 3.9 cm. The transverse arch and descending thoracic aorta are normal in  size without an aneurysm or dissection.     2. The aortic arch is left sided. The brachiocaphalic and left common carotid arteries both have common  origin. There is no coarctation.     3. The main and proximal branch pulmonary arteries are normal in size.      4. The systemic venous connections are normal.      CONCLUSIONS: The aortic         Assessment/Plan     Fluid overload as reflected by weight gain, BNP, edema, low BUN to creatinine    Plan:  1. CORE clinical with labs next week  2. Increase spironolactone to 50mgs/day  3. See me 8-12 weeks    Patient tripped depression questions with score of 23 for uncontrolled depression.  Personally questioned patient and he is not suicidal and feels unchanged from his usual depression.   Discussed with therapist and family medicine whom believe he is stable.  They will reach out to him for earlier visits.                  LUZ MARIA Sanchez  [facility]  Dilia Greene, Ph.D. [employer]

## 2019-08-27 ENCOUNTER — OFFICE VISIT (OUTPATIENT)
Dept: CARDIOLOGY | Facility: CLINIC | Age: 69
End: 2019-08-27
Attending: INTERNAL MEDICINE
Payer: COMMERCIAL

## 2019-08-27 ENCOUNTER — TELEPHONE (OUTPATIENT)
Dept: FAMILY MEDICINE | Facility: CLINIC | Age: 69
End: 2019-08-27

## 2019-08-27 VITALS
WEIGHT: 315 LBS | DIASTOLIC BLOOD PRESSURE: 84 MMHG | BODY MASS INDEX: 42.66 KG/M2 | HEIGHT: 72 IN | SYSTOLIC BLOOD PRESSURE: 123 MMHG | OXYGEN SATURATION: 94 % | HEART RATE: 94 BPM

## 2019-08-27 DIAGNOSIS — I48.19 PERSISTENT ATRIAL FIBRILLATION (H): ICD-10-CM

## 2019-08-27 DIAGNOSIS — I50.30 (HFPEF) HEART FAILURE WITH PRESERVED EJECTION FRACTION (H): ICD-10-CM

## 2019-08-27 DIAGNOSIS — E11.65 TYPE 2 DIABETES MELLITUS WITH HYPERGLYCEMIA, WITHOUT LONG-TERM CURRENT USE OF INSULIN (H): ICD-10-CM

## 2019-08-27 DIAGNOSIS — G47.33 OSA (OBSTRUCTIVE SLEEP APNEA): ICD-10-CM

## 2019-08-27 DIAGNOSIS — R06.02 SOB (SHORTNESS OF BREATH): Primary | ICD-10-CM

## 2019-08-27 DIAGNOSIS — R06.02 SOB (SHORTNESS OF BREATH): ICD-10-CM

## 2019-08-27 LAB
ANION GAP SERPL CALCULATED.3IONS-SCNC: 6 MMOL/L (ref 3–14)
BUN SERPL-MCNC: 16 MG/DL (ref 7–30)
CALCIUM SERPL-MCNC: 8.9 MG/DL (ref 8.5–10.1)
CHLORIDE SERPL-SCNC: 102 MMOL/L (ref 94–109)
CO2 SERPL-SCNC: 28 MMOL/L (ref 20–32)
CREAT SERPL-MCNC: 0.87 MG/DL (ref 0.66–1.25)
GFR SERPL CREATININE-BSD FRML MDRD: 88 ML/MIN/{1.73_M2}
GLUCOSE SERPL-MCNC: 132 MG/DL (ref 70–99)
INR PPP: 2.55 (ref 0.86–1.14)
NT-PROBNP SERPL-MCNC: 655 PG/ML (ref 0–125)
POTASSIUM SERPL-SCNC: 3.8 MMOL/L (ref 3.4–5.3)
SODIUM SERPL-SCNC: 136 MMOL/L (ref 133–144)

## 2019-08-27 PROCEDURE — 36415 COLL VENOUS BLD VENIPUNCTURE: CPT | Performed by: NURSE PRACTITIONER

## 2019-08-27 PROCEDURE — 83880 ASSAY OF NATRIURETIC PEPTIDE: CPT | Performed by: NURSE PRACTITIONER

## 2019-08-27 PROCEDURE — G0463 HOSPITAL OUTPT CLINIC VISIT: HCPCS | Mod: ZF

## 2019-08-27 PROCEDURE — 80048 BASIC METABOLIC PNL TOTAL CA: CPT | Performed by: NURSE PRACTITIONER

## 2019-08-27 PROCEDURE — 85610 PROTHROMBIN TIME: CPT | Performed by: NURSE PRACTITIONER

## 2019-08-27 PROCEDURE — 99215 OFFICE O/P EST HI 40 MIN: CPT | Mod: ZP | Performed by: INTERNAL MEDICINE

## 2019-08-27 RX ORDER — SPIRONOLACTONE 25 MG/1
50 TABLET ORAL DAILY
Qty: 180 TABLET | Refills: 3 | Status: SHIPPED | OUTPATIENT
Start: 2019-08-27 | End: 2019-09-26

## 2019-08-27 ASSESSMENT — PAIN SCALES - GENERAL: PAINLEVEL: NO PAIN (0)

## 2019-08-27 ASSESSMENT — PATIENT HEALTH QUESTIONNAIRE - PHQ9: SUM OF ALL RESPONSES TO PHQ QUESTIONS 1-9: 23

## 2019-08-27 ASSESSMENT — MIFFLIN-ST. JEOR: SCORE: 2591.08

## 2019-08-27 NOTE — NURSING NOTE
Dr. Matthias Sanchez and Dr. Lety Buenrostro called and message left that PHQ-9 score was elevated and question 9 was a 2. Requested a call back to discuss. Awaiting call back.

## 2019-08-27 NOTE — TELEPHONE ENCOUNTER
Dr. Buenrostro also spoke with him, RN was notified by telephone, a note will follow.  Kyra Jackson RN

## 2019-08-27 NOTE — TELEPHONE ENCOUNTER
Talked to Dr Willoughby  Patient at baseline.  Not acutely changed  Dr Buenrostro and I will follow-up with him    PHarper

## 2019-08-27 NOTE — NURSING NOTE
Vitals completed successfully and medication reconciled.     Fara Joyce, SANA  8:43 AM  Chief Complaint   Patient presents with     Follow Up     history of systolic heart failure, most recent LVEF normal, and atrial fibrillation.

## 2019-08-27 NOTE — NURSING NOTE
"[facility]:  PHQ-9 Screening Note    SITUATION/BACKGROUND                                                    Clrake Moreira is a 69 year old male who completed the PHQ-9 assessment for depression and Score is >9. and Question 9 on the PHQ-9 was POSITIVE FOR SUICIDAL IDEATION.    Onset of symptoms: gradual, states he has been depressed all his life.  Trigger: Recently, hospitalization and weight gain.  Recent related events: Hospitalization in March of this year, recent weight gain.  Prior history of suicide attempt or self harm: No   Risk Factors: anxiety  History of depression or mental illness: Yes  Medications reviewed: Yes     ASSESSMENT      A. Are any of the following present?      Suicidal thoughts with a plan and means to carry out the plan?    Intent to harm others    Altered mental status: confusion, delusional, psychotic YES  - Patient should be seen in the ED.  If patient is willing to go to the ED, call [employer] [facility] at [phone].  If patient is unwilling to go to the ED, call 911.   Clinic staff to fill out the  Transportation Hold  form.    Place order for referral to behavioral health team for  regular  follow-up.    NO - go to B   B. Are any of the following present?      Suicidal thoughts without a plan or means to carry out the plan    New onset of delusional ideas    Past inpatient admission for depression    New onset and recent change or addition of new medication YES  - Patient should receive crises care within 2-4 hours. Offer emergency room care or connect with any of the *crisis resources.     Place referral to behavioral health team for \"regular\" follow-up.    NO - go to C   C. Are any of the following present?      Previous suicide attempts    Depression interfering with ability to work or function    Loss of appetite and eating poorly    Abrupt cessation of drugs (OTC or RX), alcohol or caffeine    Drug or alcohol abuse YES -  Page " "behavior health team. If no response, patient should receive crisis care within 24 hours.     Place referral to behavioral health team for \"regular\" follow-up.     NO - go to D   D. Are several of the following present?      Difficulty concentrating    Difficulty sleeping    Reduced interest in sexual activity or impotency    Irregular or absent menstruation    No interest in activity    Change in interpersonal relationships    Increased use/abuse of alcohol or drugs    Pregnant or recent child birth    Recent major life change    History of depression YES -  Follow-up with PCP for appointment and follow home care instructions.    Place referral to behavioral health team for \"regular\" follow-up.    NO - provide home care instructions.        PLAN      Home Care Instructions:   Call local crisis interventions  Contact friends or family for support  Increase exercise and enjoyable activities  East a well-balanced diet, drink plenty of fluids and rest as needed  Alcohol and other recreational drugs can worsen depression.  If heavy use of drugs or alcohol, contact counselor or PCP to help reduce consumption.    Report the following to your PCP:   Suicidal thoughts without a plan or means to carry out the plan  Depression interferes with daily activities  Persistent inability to sleep    Seek emergency care immediately if any of the following occur:   Suicidal thoughts and plan and means to carry out the plan  Injury to self or others    BEHAVIORAL HEALTH TEAMS      [facility] [place] - Behavioral Health Team    [facility] Pager: [phone]    [place]  - Behavioral Health Team    Pager number: [phone]    Referral to Behavioral Health    BEHAVIORAL / SPIRITUAL HEALTH SOWQ [05220}    RESOURCES      - 24/7 Crisis Hotlines: National Suicide Prevention Hotline  043-263-WGRB (5627)    Manas Argueta RN        Copyright 2016 [employer]          "

## 2019-08-27 NOTE — TELEPHONE ENCOUNTER
Provider with the [facility] called to inform patient care team that the patient has been experiencing suicidal ideation. Call transferred to triage nurse.    Amanda Dumont, Patient Representative

## 2019-08-27 NOTE — PATIENT INSTRUCTIONS
Please increase Spironolactone to 50 MG once daily.    Follow up in one week with labs.    Thank you for your visit today.  Please call me with any questions or concerns.   Manas Argueta RN  Cardiology Care Coordinator  [phone]

## 2019-08-27 NOTE — LETTER
8/27/2019      RE: Clarke Moreira  [address]  [facility] [address]       Dear Colleague,    Thank you for the opportunity to participate in the care of your patient, Clarke Moreira, at the [facility] at [facility]. Please see a copy of my visit note below.    Christian Willoughby M.D.  Cardiovascular Medicine    I personally saw and examined this patient, discussed care with housestaff and other consultants, reviewed current laboratories and imaging studies, and conveyed impression and diagnostic/therapeutic plan to patient.    Problem List  1, Chronic systolic heart failure  2. Morbid obesity  3. Sleep disordered breathing  4. Atrial fibrillation  5. Hyperlipidemia  6. Multi-faceted shortness of breath  7. Anxiety/depression  8. Type II DM  9. Warfarin anticoagulation  10. Allergies: none  11. Lymphedema  12. Hypertension  13. Sepsis  14. Diabetic neuropathy    Referring:     Matthias Sanchez M.D.  [facility]dy - [facility]    History    69 year old male seen for follow-up evaluation and treatment regarding congestive heart failure in the context of morbid obesity.     He has has history  episodes of pre-syncope, faintness which are sometimes related to orthostatic stimuli such as position change or may occur post prandially.  The dureation is 20-30 seconds.  They do not occur daily.  They seem to occur in clusters interrupted by periods with no occurrences.  The often occur immediately following breakfast.  The patient has no palpitations, head trauma.  His lisinopril has been reduced from 10 to 5, and spironolactone also reduced.  He is on Flomax which he takes in evening.  Sleep study pending. TSH is within normal limits.    Patient lives alone, follows low sodium diet.  He is on 2L fluid restriction that he acknowledges that he is not following.  He has gained 25 pounds with uncontrolled eating and drinking.    Wt Readings from  Last 24 Encounters:   08/27/19 (!) 178.8 kg (394 lb 3.2 oz)   06/13/19 (!) 173.9 kg (383 lb 6.4 oz)   05/20/19 (!) 173.9 kg (383 lb 6.4 oz)   05/16/19 (!) 174.5 kg (384 lb 12.8 oz)   05/10/19 (!) 174.5 kg (384 lb 12.8 oz)   04/17/19 (!) 172.7 kg (380 lb 12.8 oz)   04/02/19 (!) 168.9 kg (372 lb 6.4 oz)   04/02/19 (!) 170 kg (374 lb 12.8 oz)   03/20/19 (!) 161.8 kg (356 lb 11.3 oz)   02/19/19 (!) 175.4 kg (386 lb 9.6 oz)   12/12/18 (!) 175.1 kg (386 lb)   12/11/18 (!) 176.3 kg (388 lb 9.6 oz)   10/30/18 (!) 175.7 kg (387 lb 6.4 oz)   10/22/18 (!) 175.5 kg (387 lb)   10/10/18 (!) 173.3 kg (382 lb)   09/07/18 (!) 167.8 kg (369 lb 14.4 oz)   08/28/18 (!) 167.8 kg (370 lb)   07/18/18 (!) 170.1 kg (375 lb)   06/18/18 (!) 170.4 kg (375 lb 9.6 oz)   06/15/18 (!) 171.5 kg (378 lb 1.6 oz)   06/08/18 (!) 167.8 kg (369 lb 14.9 oz)   05/21/18 (!) 174.9 kg (385 lb 9.6 oz)   04/27/18 (!) 173.7 kg (383 lb)   04/26/18 (!) 173.7 kg (383 lb)       Occupational: middle management    Longstanding depression    Family: orphan    Allergy: none    Surgery: broken bones    Has bariatric bed and chair - need maintenance    REVIEW of SYMPTOMS    Constitutional: without fever, chills, night sweats.  Weight is up 7 pounds over last several months  HEENT: without dry eyes, dry mouth, sinusitis, corryza, visual changes  Endocrine: without polyuria, polydypsia, polyphagia, heat or cold intolerance, changing mental status. Hemoglobin A!C modestly elevated  Cardiology: without chest pain, tightness, heaviness, pressure, paroxysmal dyspnea, orthopnea, palpitation, pre-syncope or syncope or device discharge (if present)  Pulmonary: without asthma, wheezing, cough, hemoptysis  GI: without nausea, emesis, jaundice, pain, hematemesis, melena  : without frequency, urgency, hematuria, stones, pain, abnormal bleeding, frequency, urgency but history of urinary retention requiring catherization   Neurologic: without TIA, CVA, trauma, seizure  Dermatologic:  without lesions, abrasion rash,   Orthopedic/Rheum: without significant joint pain, impairment, limb, polyserositis, ulceration, Raynauds  Heme: without mass, bruising, frequent infection, anemia  Psychiatric: without substance abuse, hallucination, medication, depression    Exercise tolerance: reduced, walks with walker, home physical therapy    Objective  Constitutional: alert, oriented, normal abnormal  Abnormal gait. and station, normal mentation.  Oral: moist mucous membranes  Lymph: without pathologic adenopathy  Chest: clear to ausculation and percussion save for basilar rales  Cor: No evidence of left or right ventricular activity.  Rhythm isiregular.  S1 variablel, S2 split physiologically. Murmurs are not present  Abdomen: without tenderness, rebound, guarding, masses, ascites  Extremities: Chronic lymphedema  Neuro: no focal defects, normal mentation  Skin: without open lesions save for toe  Psych: oriented, verbal, mental status in tact      Meds    Current Outpatient Medications   Medication     ammonium lactate (LAC-HYDRIN) 12 % external cream     amoxicillin-clavulanate (AUGMENTIN) 875-125 MG tablet     Ascorbic Acid (VITAMIN C) POWD     aspirin (ASA) 81 MG tablet     atorvastatin (LIPITOR) 40 MG tablet     bacitracin 500 UNIT/GM external ointment     blood glucose (ONETOUCH ULTRA) test strip     blood glucose monitoring (ONE TOUCH DELICA) lancets     blood glucose monitoring (ONE TOUCH ULTRA MINI) meter device kit     levothyroxine (SYNTHROID/LEVOTHROID) 175 MCG tablet     metFORMIN (GLUCOPHAGE) 500 MG tablet     metoprolol succinate ER (TOPROL-XL) 100 MG 24 hr tablet     multivitamin w/minerals (MULTI-VITAMIN) tablet     naproxen (NAPROSYN) 500 MG tablet     nystatin (MYCOSTATIN) 507083 UNIT/GM external cream     omega 3 1000 MG CAPS     order for DME     order for DME     order for DME     order for DME     polyethylene glycol (MIRALAX/GLYCOLAX) packet     potassium chloride ER (K-DUR/KLOR-CON M)  20 MEQ CR tablet     senna-docusate (SENOKOT-S/PERICOLACE) 8.6-50 MG tablet     spironolactone (ALDACTONE) 25 MG tablet     tamsulosin (FLOMAX) 0.4 MG capsule     torsemide (DEMADEX) 100 MG tablet     traMADol (ULTRAM) 50 MG tablet     vitamin B complex with vitamin C (VITAMIN  B COMPLEX) tablet     vitamin E (VITAMIN E COMPLEX) 1000 units (450 mg) capsule     warfarin (COUMADIN) 5 MG tablet     Current Facility-Administered Medications   Medication     lidocaine 1% with EPINEPHrine 1:100,000 injection 3 mL     Labs    Results for AJIT CALLAHAN (MRN [id number]) as of 10/30/2018 09:22   Ref. Range 8/28/2018 13:58 8/28/2018 16:40 9/9/2018 11:11 10/22/2018 10:09 10/22/2018 12:46 10/30/2018 08:41   Sodium Latest Ref Range: 132.6 - 141.4 mmol/L 136.9   133.5     Potassium Latest Ref Range: 3.3 - 4.5 mmol/dL 4.3   4.0     Chloride Latest Ref Range: 98.0 - 110.0 mmol/L 99.7   98.2     Carbon Dioxide Latest Ref Range: 20.0 - 32.0 mmol/L 29.4   29.0     Urea Nitrogen Latest Ref Range: 7.0 - 21.0 mg/dL 34.5 (H)   23.6 (H)     Creatinine Latest Ref Range: 0.7 - 1.3 mg/dL 1.2   1.3     GFR Estimate Latest Ref Range: >60.0 mL/min/1.7 m2 64.0   58.3 (L)     GFR Estimate If Black Latest Ref Range: >60.0 mL/min/1.7 m2 77.4   70.6     Calcium Latest Ref Range: 8.5 - 10.1 mg/dL 9.4   9.6     Albumin Latest Ref Range: 3.5 - 4.7 mg/dL 4.4        Protein Total Latest Ref Range: 6.8 - 8.8 g/dL 7.6        Bilirubin Total Latest Ref Range: 0.2 - 1.3 mg/dL 0.7        Alkaline Phosphatase Latest Ref Range: 31.7 - 110.7 U/L 81.8        ALT Latest Ref Range: 0.0 - 45.0 U/L 18.0        AST Latest Ref Range: 0.0 - 55.0 U/L 18.0        Hemoglobin A1C Latest Ref Range: 4.1 - 5.7 % 5.7        Cholesterol Latest Ref Range: 0.0 - 200.0 mg/dL 120.3        Cholesterol/HDL Ratio Latest Ref Range: 0.0 - 5.0  2.7        HDL Cholesterol Latest Ref Range: >40.0 mg/dL 44.1        LDL Cholesterol Calculated Latest Ref Range: 0 - 129 mg/dL 55        VLDL  Cholesterol Latest Ref Range: 7.0 - 32.0 mg/dL 21.4        N-Terminal Pro Bnp Latest Ref Range: 0 - 125 pg/mL  1231 (H)   1091 (H)    Occult Blood Scn FIT Latest Ref Range: NEG^Negative    Negative      Triglycerides Latest Ref Range: 0.0 - 150.0 mg/dL 107.0        Glucose Latest Ref Range: 70.0 - 99.0 mg'dL 99.4 (H)   114.1 (H)     INR Unknown 2.3   3.5     ECHO COMPLETE WITH OPTISON Unknown      Rpt         Imaging   Name: AJIT CALLAHAN  MRN: [id number]  : 1950  Study Date: 2017 08:49 AM  Age: 67 yrs  Gender: Male  [place] [facility] [place] Study: Pulmonary Hypertension  Ordering Physician: LEWIS BALTAZAR  Performed By: Bernard Snider RDCS     BSA: 3.0 m2  Height: 71 in  Weight: 444 lb  HR: 110  BP: 87/61 mmHg  _____________________________________________________________________________  __        Procedure  Limited Portable Echo Adult. Contrast Lumason. Lumason (NDC #5242-3953-92)  given intravenously. Patient was given 5ml mixture of Lumason. 0 ml wasted.  _____________________________________________________________________________  __        Interpretation Summary  LVEF is probably normal on limited contrasct-enhanced images.  On limited views, RV function is probably mildly reduced.  Pulmonary artery systolic pressure cannot be assessed.  The inferior vena cava is dilated at 3.34 cm without respiratory variability, consistent with increased right atrial pressure. Estimated right atrial pressure is > 15 mmHg.  No pericardial effusion is present.  _____________________________________________________________________________    Exam: XR CHEST PORT 1 VW, 10/4/2017 9:39 AM  Weight around 404     Indication: Follow up CXR on pulmonary edema     Comparison: Chest radiograph 2017.     Findings:   Single AP view of the chest dated degrees. Right upper extremity PICC  in stable position with tip in the mid SVC. The visualized upper  abdomen is unremarkable. Left-sided rib fractures.  Soft tissues are  unremarkable. The trachea is midline. The cardiomediastinal silhouette  is within normal limits. Interval resolution of bilateral pleural  effusions and associated bibasilar atelectasis and consolidation. Mild  interval decrease in pulmonary vascular congestion.         Impression:   1. Interval resolution bilateral pleural effusions and associated  bibasilar atelectasis and/or consolidation.  2. Mild interval decrease in pulmonary vascular congestion.     I have personally reviewed the examination and initial interpretation  and I agree with the findings.    Clinical history: 74 year old man with AF, recovered tachycardia mediated cardiomyopathy and dilated aortic  root on TTE 4.2 cm. MRA for further evaluation.      Comparison MRA: None.      1. The aortic root is mildly dilated, measuring 3.9 cm at the level of sinus of Valsalva.     2. The ascending aorta measures 3.9 cm. The transverse arch and descending thoracic aorta are normal in  size without an aneurysm or dissection.     2. The aortic arch is left sided. The brachiocaphalic and left common carotid arteries both have common  origin. There is no coarctation.     3. The main and proximal branch pulmonary arteries are normal in size.      4. The systemic venous connections are normal.      CONCLUSIONS: The aortic         Assessment/Plan     Fluid overload as reflected by weight gain, BNP, edema, low BUN to creatinine    Plan:  1. CORE clinical with labs next week  2. Increase spironolactone to 50mgs/day  3. See me 8-12 weeks    Patient tripped depression questions with score of 23 for uncontrolled depression.  Personally questioned patient and he is not suicidal and feels unchanged from his usual depression.   Discussed with therapist and family medicine whom believe he is stable.  They will reach out to him for earlier visits.    LUZ MARIA Sanchez  [facility]  Dilia Greene, Ph.D. [employer]    Please do not  hesitate to contact me if you have any questions/concerns.     Sincerely,     Christian Willoughby MD

## 2019-08-27 NOTE — TELEPHONE ENCOUNTER
RN spoke with Dr. Willoughby the cardiologist who is seeing Clarke currently and his PHQ shows he has thoughts of harming himself/ feeling he would be better off dead greater than half the days. His overall PHQ-9 is scored at a 23. MD is requesting to speak with either Dr. Buenrostro or Laura to determine best course of action as patient is unwilling to go to hospital with his own free will. Prior to doing a hold he would like more information on the patient. RN paged both providers for him to the number [phone] that was provided to me. Also routing this encounter to both providers.  Kyra Jackson RN

## 2019-08-28 ENCOUNTER — OFFICE VISIT (OUTPATIENT)
Dept: DERMATOLOGY | Facility: CLINIC | Age: 69
End: 2019-08-28
Payer: COMMERCIAL

## 2019-08-28 DIAGNOSIS — C44.319 BASAL CELL CARCINOMA (BCC) OF LEFT FOREHEAD: Primary | ICD-10-CM

## 2019-08-28 ASSESSMENT — PAIN SCALES - GENERAL: PAINLEVEL: NO PAIN (0)

## 2019-08-28 NOTE — NURSING NOTE
Dermatology Rooming Note    Clarke Moreira's goals for this visit include:   Chief Complaint   Patient presents with     Derm Problem     Clarke is her efor 2 week follow up graft     Monae Penn, CMA

## 2019-08-28 NOTE — LETTER
8/28/2019       RE: Clarke Moreira  [address]  [facility] [address]     Dear Colleague,    Thank you for referring your patient, Clarke Moreira, to the [facility] DERMATOLOGIC SURGERY at [facility]. Please see a copy of my visit note below.    [facility] Dermatology Note    Dermatology Surgery Clinic  [facility]  [address], [place] [address]    Dermatology Problem List:  1. History of BCC on left forehead which was not treated - 08/2014; MMS date 07/09/2019   -patient never made follow up appointment for Mohs with Dr. Moreno in 2014. Re-biopsy today 6/12/19 - will plan on MMS once path is back  2. Stasis Dermatitis    Encounter Date: Aug 28, 2019    CC:  Chief Complaint   Patient presents with     Derm Problem     Clarke is her efor 2 week follow up graft       History of Present Illness:  Mr. Clarke Moreira is a 69 year old male who presents today for a followup from the previous surgery. Details are below.    Original Procedure Date: 07/09/2019   Reason for visit: MMS for BCC   Bleeding that required medical attention: none   Infection: none   Hospitalization for procedure within 30 days: none   Are you having any functional problems since the surgery: NA      Case(s) Specific Information:  Procedure Location: L forehead  Type of Repair: advancement with Burrow's graft  Is patient happy with appearance of scar: NA  On 1-10 scale, with 10 being how the area looked before the surgery and 1 being the worst imaginable scar, how do you think it looks today?: NA    The patient was last seen by me on 07/30/2019 for a followup. At the time, his surgical site was 80% healed with no problems. We had him continue wound care on the inferior portion of the surgical site. Today, the patient reports that there is numbness at the region of the surgical site. He states that he changes the  bandages every other day. The patient is otherwise feeling well. There are no other skin concerns at this time.      Past Medical History:   Patient Active Problem List   Diagnosis     Atrial fibrillation (H)     Hypothyroidism     Basal cell carcinoma of skin of face     CTS (carpal tunnel syndrome)     Myopia     Plantar fasciitis     Presbyopia     Regular astigmatism     Neoplasm of uncertain behavior of skin     Venous stasis     Type 2 diabetes mellitus with hyperglycemia, without long-term current use of insulin (H)     Morbid obesity (H)     Depression with anxiety     Hyperlipidemia LDL goal <100     Fall     BiPAP (biphasic positive airway pressure) dependence     Lymphedema     Chronic systolic congestive heart failure (H)     Essential hypertension with goal blood pressure less than 140/90     Urinary retention     Constipation, unspecified constipation type     Cellulitis     Onychomycosis     (HFpEF) heart failure with preserved ejection fraction (H)     Chronic hypercapnic respiratory failure (H)     Hypoventilation associated with obesity syndrome (H)     Pre-syncope     Elevated serum creatinine     PAD (peripheral artery disease) (H)     THONG (obstructive sleep apnea)     Hypotension     Adequate nutrition     Past Medical History:   Diagnosis Date     Atrial fibrillation (H)      Basal cell carcinoma      Chronic anticoagulation      Diastolic heart failure (H)      Dyslipidemia      Essential hypertension      Morbid obesity (H)      Sleep-disordered breathing      Type 2 diabetes mellitus (H)      Past Surgical History:   Procedure Laterality Date     BIOPSY OF SKIN LESION       MOHS MICROGRAPHIC PROCEDURE       PICC INSERTION Right 09/24/2017    5fr TL Bard PICC, 51cm (1cm external) in the R medial brachial vein w/ tip in the SVC.       Social History:  Social History     Socioeconomic History     Marital status: Single     Spouse name: Not on file     Number of children: Not on file     Years  of education: Not on file     Highest education level: Not on file   Occupational History     Not on file   Social Needs     Financial resource strain: Not on file     Food insecurity:     Worry: Not on file     Inability: Not on file     Transportation needs:     Medical: Not on file     Non-medical: Not on file   Tobacco Use     Smoking status: Never Smoker     Smokeless tobacco: Never Used   Substance and Sexual Activity     Alcohol use: No     Comment: Former alcohol abuse. Quit 70s then quit later in 80s-present     Drug use: No     Comment: history of LSD use     Sexual activity: Not on file   Lifestyle     Physical activity:     Days per week: Not on file     Minutes per session: Not on file     Stress: Not on file   Relationships     Social connections:     Talks on phone: Not on file     Gets together: Not on file     Attends [employer] service: Not on file     Active member of club or organization: Not on file     Attends meetings of clubs or organizations: Not on file     Relationship status: Not on file     Intimate partner violence:     Fear of current or ex partner: Not on file     Emotionally abused: Not on file     Physically abused: Not on file     Forced sexual activity: Not on file   Other Topics Concern     Parent/sibling w/ CABG, MI or angioplasty before 65F 55M? Not Asked   Social History Narrative    Lives in an apartment in 16th floor near hospital.  Has elevators, unable to use stairs. Not able to shop; has things delivered to him including food. Difficulty completing cleaning. Goes to PCP once yearly for annual check up and medication review.       Family History:  Family History   Problem Relation Age of Onset     Depression Mother      Glaucoma Maternal Grandfather      Cancer No family hx of         No family history of skin cancer     Macular Degeneration No family hx of         Medications:  Current Outpatient Medications   Medication Sig Dispense Refill     ammonium lactate  (LAC-HYDRIN) 12 % external cream Apply topically 2 times daily as needed for dry skin 385 g 0     amoxicillin-clavulanate (AUGMENTIN) 875-125 MG tablet Take 1 tablet by mouth 2 times daily 20 tablet 0     Ascorbic Acid (VITAMIN C) POWD Take 500 mcg by mouth daily  0     aspirin (ASA) 81 MG tablet Take 1 tablet (81 mg) by mouth daily 30 tablet 0     atorvastatin (LIPITOR) 40 MG tablet Take 1 tablet (40 mg) by mouth daily 30 tablet 11     bacitracin 500 UNIT/GM external ointment Apply 30 g topically 2 times daily 30 g 1     blood glucose (ONETOUCH ULTRA) test strip Use to test blood sugars 2 times daily or as directed. 100 strip 11     blood glucose monitoring (ONE TOUCH DELICA) lancets Use to test blood sugars 2 times daily or as directed. 100 each 11     blood glucose monitoring (ONE TOUCH ULTRA MINI) meter device kit Use to test blood sugars 2 times daily or as directed. 1 kit 0     levothyroxine (SYNTHROID/LEVOTHROID) 175 MCG tablet Take 1 tablet (175 mcg) by mouth every morning (before breakfast) 90 tablet 3     metFORMIN (GLUCOPHAGE) 500 MG tablet Take 1 tablet (500 mg) by mouth 2 times daily (with meals) 60 tablet 11     metoprolol succinate ER (TOPROL-XL) 100 MG 24 hr tablet Take 1 tablet (100 mg) by mouth daily 30 tablet 11     multivitamin w/minerals (MULTI-VITAMIN) tablet Take 1 tablet by mouth daily 30 each 0     naproxen (NAPROSYN) 500 MG tablet Take 1 tablet (500 mg) by mouth 2 times daily (with meals) 10 tablet 1     nystatin (MYCOSTATIN) 052260 UNIT/GM external cream Apply topically 2 times daily as needed for dry skin 30 g 11     omega 3 1000 MG CAPS Take 1 g by mouth daily 30 capsule 0     order for DME Equipment being ordered: Diabetes Shoes 1 Units 0     order for DME Equipment being ordered: Custom diabetic shoes 1 Units 0     order for DME Equipment being ordered: Bariatric Lift chair  Diagnosis - morbid obesity, CHF, Lymphedema    Fax to Ne from [employer] at [phone]. 1 Units 0     order  for DME Equipment being ordered: Other: Velcro Compression stockings.   Treatment Diagnosis: bilateral lymphedema. 2 each 0     polyethylene glycol (MIRALAX/GLYCOLAX) packet Take 17 g by mouth daily as needed for constipation 15 packet 0     potassium chloride ER (K-DUR/KLOR-CON M) 20 MEQ CR tablet Take 2 tablets (40 mEq) by mouth 2 times daily 120 tablet 11     senna-docusate (SENOKOT-S/PERICOLACE) 8.6-50 MG tablet Take 1-2 tablets by mouth 2 times daily as needed for constipation 60 tablet 0     spironolactone (ALDACTONE) 25 MG tablet Take 2 tablets (50 mg) by mouth daily 180 tablet 3     tamsulosin (FLOMAX) 0.4 MG capsule Take 1 capsule (0.4 mg) by mouth daily 30 capsule 11     torsemide (DEMADEX) 100 MG tablet Take 1 tablet (100 mg) by mouth daily Take one tab (100mg) by mouth every morning, take one half tab (50mg) by mouth every evening 30 tablet 11     traMADol (ULTRAM) 50 MG tablet Take 1 tablet (50 mg) by mouth every 6 hours as needed for severe pain 10 tablet 0     vitamin B complex with vitamin C (VITAMIN  B COMPLEX) tablet Take 1 tablet by mouth daily 100 tablet 3     vitamin E (VITAMIN E COMPLEX) 1000 units (450 mg) capsule Take 1 capsule (1,000 Units) by mouth daily 100 capsule 3     warfarin (COUMADIN) 5 MG tablet Take 12.5 mg by mouth  1x/week, Take 10 mg by mouth 6x per week 75 tablet 11       No Known Allergies    Review of Systems:  -Skin Establ Pt: The patient denies any new rash, pruritus, or lesions that are symptomatic, changing or bleeding, except as per HPI.  -Constitutional: The patient is feeling generally well.    Physical exam:  Vitals: There were no vitals taken for this visit.  GEN: This is a well developed, well-nourished male in no acute distress, in a pleasant mood.    SKIN: Focused examination of the forehead was performed.  - well-healed graft; small scab removed to reveal viable graft underneath  - No other lesions of concern on areas examined.       Impression/Plan:    1. BCC of  the L forehead, s/p MMS with advancement with Mary's graft repair 07/09/19   Upon review of the surgical site, there was a well-healing advancement flap and full thickness skin graft. There was no sign of infections.    Instructed to continue daily dressing changes and application of petroleum jelly until healed over with new pink skin and all sutures are dissolved.     Recommended sunscreens SPF #50 or greater and protective clothing. Risk of scar dyspigmentation discussed.     Continue periodic self skin exams and report of any new or changing lesions.     Please refer to previous clinic note for details     We will follow up in 3 months for a final cosmetic      Staff Involved:    Scribe Disclosure  I, Janice Zhao, am serving as a scribe to document services personally performed by Dr. Pieter Wilkinson, based on data collection and the provider's statements to me.     Attending Attestation  I attest that the Scribe recorded the interview and exam that I personally performed.  I have reviewed the note and edited it as necessary.    Pieter Wilkinson M.D.  Professor  Director of Dermatologic Surgery  Department of Dermatology  [facility]

## 2019-08-28 NOTE — TELEPHONE ENCOUNTER
Called Dr. Willoughby.  lCarke had completed a PHQ-9 with a total score of 23 and endorsed a 2 on Q9.  Dr. Willoughby reports that on further interview, patient was reporting some passive thoughts, no plan or intent to harm himself.  PHQ 9 scores in the low 20's is Clarke's baseline for this screener.  Based on information provided, stated that I did not think he needed to go to the ER for further evaluation.      Plan:  1. I will call Clarke tomorrow to f/u and check-in with him.  2. Clarke has appt with Dr. Sanchez on 9/3  3. Clarke has an appt with Dr. Buenrostro on 9/9.  Based on conversation tomorrow, if he needs to come in sooner, we will arrange a time.

## 2019-08-28 NOTE — PROGRESS NOTES
[facility] Dermatology Note    Dermatology Surgery Clinic  [facility]  [address] [place] [id number]    Dermatology Problem List:  1. History of BCC on left forehead which was not treated - 08/2014; MMS date 07/09/2019   -patient never made follow up appointment for Mohs with Dr. Moreno in 2014. Re-biopsy today 6/12/19 - will plan on MMS once path is back  2. Stasis Dermatitis    Encounter Date: Aug 28, 2019    CC:  Chief Complaint   Patient presents with     Derm Problem     Clarke is her efor 2 week follow up graft       History of Present Illness:  Mr. Clarke Moreira is a 69 year old male who presents today for a followup from the previous surgery. Details are below.    Original Procedure Date: 07/09/2019   Reason for visit: MMS for BCC   Bleeding that required medical attention: none   Infection: none   Hospitalization for procedure within 30 days: none   Are you having any functional problems since the surgery: NA      Case(s) Specific Information:  Procedure Location: L forehead  Type of Repair: advancement with Burrow's graft  Is patient happy with appearance of scar: NA  On 1-10 scale, with 10 being how the area looked before the surgery and 1 being the worst imaginable scar, how do you think it looks today?: NA    The patient was last seen by me on 07/30/2019 for a followup. At the time, his surgical site was 80% healed with no problems. We had him continue wound care on the inferior portion of the surgical site. Today, the patient reports that there is numbness at the region of the surgical site. He states that he changes the bandages every other day. The patient is otherwise feeling well. There are no other skin concerns at this time.      Past Medical History:   Patient Active Problem List   Diagnosis     Atrial fibrillation (H)     Hypothyroidism     Basal cell carcinoma of skin of face     CTS (carpal tunnel syndrome)      Myopia     Plantar fasciitis     Presbyopia     Regular astigmatism     Neoplasm of uncertain behavior of skin     Venous stasis     Type 2 diabetes mellitus with hyperglycemia, without long-term current use of insulin (H)     Morbid obesity (H)     Depression with anxiety     Hyperlipidemia LDL goal <100     Fall     BiPAP (biphasic positive airway pressure) dependence     Lymphedema     Chronic systolic congestive heart failure (H)     Essential hypertension with goal blood pressure less than 140/90     Urinary retention     Constipation, unspecified constipation type     Cellulitis     Onychomycosis     (HFpEF) heart failure with preserved ejection fraction (H)     Chronic hypercapnic respiratory failure (H)     Hypoventilation associated with obesity syndrome (H)     Pre-syncope     Elevated serum creatinine     PAD (peripheral artery disease) (H)     THONG (obstructive sleep apnea)     Hypotension     Adequate nutrition     Past Medical History:   Diagnosis Date     Atrial fibrillation (H)      Basal cell carcinoma      Chronic anticoagulation      Diastolic heart failure (H)      Dyslipidemia      Essential hypertension      Morbid obesity (H)      Sleep-disordered breathing      Type 2 diabetes mellitus (H)      Past Surgical History:   Procedure Laterality Date     BIOPSY OF SKIN LESION       MOHS MICROGRAPHIC PROCEDURE       PICC INSERTION Right 09/24/2017    5fr TL Bard PICC, 51cm (1cm external) in the R medial brachial vein w/ tip in the SVC.       Social History:  Social History     Socioeconomic History     Marital status: Single     Spouse name: Not on file     Number of children: Not on file     Years of education: Not on file     Highest education level: Not on file   Occupational History     Not on file   Social Needs     Financial resource strain: Not on file     Food insecurity:     Worry: Not on file     Inability: Not on file     Transportation needs:     Medical: Not on file     Non-medical: Not  on file   Tobacco Use     Smoking status: Never Smoker     Smokeless tobacco: Never Used   Substance and Sexual Activity     Alcohol use: No     Comment: Former alcohol abuse. Quit 70s then quit later in 80s-present     Drug use: No     Comment: history of LSD use     Sexual activity: Not on file   Lifestyle     Physical activity:     Days per week: Not on file     Minutes per session: Not on file     Stress: Not on file   Relationships     Social connections:     Talks on phone: Not on file     Gets together: Not on file     Attends Rastafari service: Not on file     Active member of club or organization: Not on file     Attends meetings of clubs or organizations: Not on file     Relationship status: Not on file     Intimate partner violence:     Fear of current or ex partner: Not on file     Emotionally abused: Not on file     Physically abused: Not on file     Forced sexual activity: Not on file   Other Topics Concern     Parent/sibling w/ CABG, MI or angioplasty before 65F 55M? Not Asked   Social History Narrative    Lives in an apartment in 16th floor near hospital.  Has elevators, unable to use stairs. Not able to shop; has things delivered to him including food. Difficulty completing cleaning. Goes to PCP once yearly for annual check up and medication review.       Family History:  Family History   Problem Relation Age of Onset     Depression Mother      Glaucoma Maternal Grandfather      Cancer No family hx of         No family history of skin cancer     Macular Degeneration No family hx of         Medications:  Current Outpatient Medications   Medication Sig Dispense Refill     ammonium lactate (LAC-HYDRIN) 12 % external cream Apply topically 2 times daily as needed for dry skin 385 g 0     amoxicillin-clavulanate (AUGMENTIN) 875-125 MG tablet Take 1 tablet by mouth 2 times daily 20 tablet 0     Ascorbic Acid (VITAMIN C) POWD Take 500 mcg by mouth daily  0     aspirin (ASA) 81 MG tablet Take 1 tablet (81  mg) by mouth daily 30 tablet 0     atorvastatin (LIPITOR) 40 MG tablet Take 1 tablet (40 mg) by mouth daily 30 tablet 11     bacitracin 500 UNIT/GM external ointment Apply 30 g topically 2 times daily 30 g 1     blood glucose (ONETOUCH ULTRA) test strip Use to test blood sugars 2 times daily or as directed. 100 strip 11     blood glucose monitoring (ONE TOUCH DELICA) lancets Use to test blood sugars 2 times daily or as directed. 100 each 11     blood glucose monitoring (ONE TOUCH ULTRA MINI) meter device kit Use to test blood sugars 2 times daily or as directed. 1 kit 0     levothyroxine (SYNTHROID/LEVOTHROID) 175 MCG tablet Take 1 tablet (175 mcg) by mouth every morning (before breakfast) 90 tablet 3     metFORMIN (GLUCOPHAGE) 500 MG tablet Take 1 tablet (500 mg) by mouth 2 times daily (with meals) 60 tablet 11     metoprolol succinate ER (TOPROL-XL) 100 MG 24 hr tablet Take 1 tablet (100 mg) by mouth daily 30 tablet 11     multivitamin w/minerals (MULTI-VITAMIN) tablet Take 1 tablet by mouth daily 30 each 0     naproxen (NAPROSYN) 500 MG tablet Take 1 tablet (500 mg) by mouth 2 times daily (with meals) 10 tablet 1     nystatin (MYCOSTATIN) 477122 UNIT/GM external cream Apply topically 2 times daily as needed for dry skin 30 g 11     omega 3 1000 MG CAPS Take 1 g by mouth daily 30 capsule 0     order for DME Equipment being ordered: Diabetes Shoes 1 Units 0     order for DME Equipment being ordered: Custom diabetic shoes 1 Units 0     order for DME Equipment being ordered: Bariatric Lift chair  Diagnosis - morbid obesity, CHF, Lymphedema    Fax to Ne from [employer] at [phone]. 1 Units 0     order for DME Equipment being ordered: Other: Velcro Compression stockings.   Treatment Diagnosis: bilateral lymphedema. 2 each 0     polyethylene glycol (MIRALAX/GLYCOLAX) packet Take 17 g by mouth daily as needed for constipation 15 packet 0     potassium chloride ER (K-DUR/KLOR-CON M) 20 MEQ CR tablet Take 2 tablets  (40 mEq) by mouth 2 times daily 120 tablet 11     senna-docusate (SENOKOT-S/PERICOLACE) 8.6-50 MG tablet Take 1-2 tablets by mouth 2 times daily as needed for constipation 60 tablet 0     spironolactone (ALDACTONE) 25 MG tablet Take 2 tablets (50 mg) by mouth daily 180 tablet 3     tamsulosin (FLOMAX) 0.4 MG capsule Take 1 capsule (0.4 mg) by mouth daily 30 capsule 11     torsemide (DEMADEX) 100 MG tablet Take 1 tablet (100 mg) by mouth daily Take one tab (100mg) by mouth every morning, take one half tab (50mg) by mouth every evening 30 tablet 11     traMADol (ULTRAM) 50 MG tablet Take 1 tablet (50 mg) by mouth every 6 hours as needed for severe pain 10 tablet 0     vitamin B complex with vitamin C (VITAMIN  B COMPLEX) tablet Take 1 tablet by mouth daily 100 tablet 3     vitamin E (VITAMIN E COMPLEX) 1000 units (450 mg) capsule Take 1 capsule (1,000 Units) by mouth daily 100 capsule 3     warfarin (COUMADIN) 5 MG tablet Take 12.5 mg by mouth  1x/week, Take 10 mg by mouth 6x per week 75 tablet 11       No Known Allergies    Review of Systems:  -Skin Establ Pt: The patient denies any new rash, pruritus, or lesions that are symptomatic, changing or bleeding, except as per HPI.  -Constitutional: The patient is feeling generally well.    Physical exam:  Vitals: There were no vitals taken for this visit.  GEN: This is a well developed, well-nourished male in no acute distress, in a pleasant mood.    SKIN: Focused examination of the forehead was performed.  - well-healed graft; small scab removed to reveal viable graft underneath  - No other lesions of concern on areas examined.       Impression/Plan:    1. BCC of the L forehead, s/p MMS with advancement with Mary's graft repair 07/09/19   Upon review of the surgical site, there was a well-healing advancement flap and full thickness skin graft. There was no sign of infections.    Instructed to continue daily dressing changes and application of petroleum jelly until  healed over with new pink skin and all sutures are dissolved.     Recommended sunscreens SPF #50 or greater and protective clothing. Risk of scar dyspigmentation discussed.     Continue periodic self skin exams and report of any new or changing lesions.     Please refer to previous clinic note for details     We will follow up in 3 months for a final cosmetic      Staff Involved:    Scribe Disclosure  I, Janice Pavel, am serving as a scribe to document services personally performed by Dr. Pieter Wilkinson, based on data collection and the provider's statements to me.     Attending Attestation  I attest that the Scribe recorded the interview and exam that I personally performed.  I have reviewed the note and edited it as necessary.    Pieter Wilkinson M.D.  Professor  Director of Dermatologic Surgery  Department of Dermatology  [facility]

## 2019-08-28 NOTE — TELEPHONE ENCOUNTER
Called Clarke this morning.  He sounds upbeat and cheerful on the phone.  He is delighted to hear from this writer.  Wanted to talk further, but he had a friend visiting that he also wanted to talk to.  Referred to this as a great problem to have - to have two people he wants to talk to checking in on him.  He states he is not having suicidal thoughts today.  States he wants to talk more about what happened yesterday at our visit on 9/9.  He states he is planning to be at his appt with Dr. Sanchez on 9/3 and our appt on 9/9.  Reports he had an appt with this writer for 9/5, but had to change it due to an appt in the wound clinic on 9/5.      Plan:  1. Clarke will see Dr. Sanchez on 9/3             2. He will see this writer on 9/9 and call before then if needed.

## 2019-09-02 ENCOUNTER — DOCUMENTATION ONLY (OUTPATIENT)
Dept: FAMILY MEDICINE | Facility: CLINIC | Age: 69
End: 2019-09-02

## 2019-09-02 NOTE — PROGRESS NOTES
Scheduling:  If you had an XRay/CT/Ultrasound/MRI ordered the number is [phone] to schedule or change your radiology appointment.   [employer] PERSONAL PREVENTIVE SERVICES PLAN - SERVICES  For Men   Review these tests with your doctor then decide which ones you want and take this page home for your reference     Immunization History   Administered Date(s) Administered     Influenza (IIV3) PF 12/15/2005, 03/08/2007, 01/25/2008, 10/01/2010, 12/02/2011, 09/06/2017     Mantoux Tuberculin Skin Test 10/09/2017, 03/23/2019     Pneumo Conj 13-V (2010&after) 11/27/2017     Pneumococcal 23 valent 03/24/2019     TDAP Vaccine (Boostrix) 11/27/2017     Tdap (Adacel,Boostrix) 03/08/2007                                                       IMMUNIZATIONS Description Recommend today?     Influenza (flu shot) Helps to prevent flu; you should get it every year Not available yet   PCV 13 Prevents pneumonia; given once No; is up to date.   PPSV 23 Prevents pneumonia; given once No; is up to date.   Tetanus Prevents tetanus; need every 10 years No; is up to date.   Herpes Zoster (shingles) Prevents or lessens symptoms from shingles; given once.  Offer  If you want this vaccine please go to a pharmacy to receive Shinrix   Hepatitis B  If you have any of the following risk factors you should be immunized for hepatitis B: severe kidney disease, people who live in the same house as a carrier of Hepatitis B virus, people who live in  institutions (e.g. nursing homes or group homes), homosexual men, patients with hemophilia who received Factor VIII or IX concentrates, abusers of illicit injectable drugs Check serology  today     Health Maintenance   Topic Date Due     HF ACTION PLAN  1950     ADVANCE CARE PLANNING  1950     DEPRESSION ACTION PLAN  1950     ZOSTER IMMUNIZATION (1 of 2) 05/24/2000     MEDICARE ANNUAL WELLNESS VISIT  05/24/2015     MICROALBUMIN  11/10/2018     A1C  05/26/2019     EYE EXAM   06/12/2019     FALL RISK ASSESSMENT  06/12/2019     LIPID  08/28/2019     INFLUENZA VACCINE (1) 09/01/2019     FIT  09/09/2019     PHQ-9  11/27/2019     BMP  02/27/2020     ALT  03/06/2020     CBC  04/17/2020     DIABETIC FOOT EXAM  05/16/2020     TSH W/FREE T4 REFLEX  03/12/2021     DTAP/TDAP/TD IMMUNIZATION (3 - Td) 11/27/2027     HEPATITIS C SCREENING  Completed     PNEUMOCOCCAL IMMUNIZATION 65+ LOW/MEDIUM RISK  Completed     AORTIC ANEURYSM SCREENING (SYSTEM ASSIGNED)  Completed     IPV IMMUNIZATION  Aged Out     MENINGITIS IMMUNIZATION  Aged Out         SCREENING TESTS     Description   Year of Last Screening   Recommended Today?   Heart disease screening blood tests    Cholesterol level Reducing cholesterol can reduce your risk of heart attacks by 25%.  Screening is recommended yearly if you are at risk of heart disease otherwise every 4-5 years  No; is up to date.     Diabetes screening tests    Hemoglobin A1c blood test   Finding and treating diabetes early can reduce complications.  Screening recommended/covered yearly if you have high blood pressure, high cholesterol, obesity (BMI >30), or a history of high blood glucose tests; or 2 of the following: family history of diabetes, overweight (BMI >25 but <30), age 65 years or older, and a history of diabetes of pregnancy or gave birth to baby weighing more than 9 lbs.    No; is up to date.   Hepatitis B screening Finding hepatitis B early can reduce complications.  Screening is recommended for persons with selected risk factors.  serology   Hepatitis C screening Finding hepatitis C early can reduce complications.  Screening is recommended for all persons born from 1945 through 1965 and for those with selected other risk factors.   No; is up to date.   HIV screening Finding HIV early can reduce complications.  Screening is recommended for persons with risk factors for HIV infection.  offer   Glaucoma screening Early detection of glaucoma can prevent blindness.    Please talk to your eye doctor about this.       SCREENING TESTS     Description   Year of Last Screening   Recommended Today?   Colorectal cancer screening    Fecal occult blood test     Screening colonoscopy Screening for colon cancer has been shown to reduce death from colon cancer by 25-30%. Screening recommended to start at 50 years and continuing until age 75 years.    Offer iFIT   Screening for Lung Cancer     Low-dose CT scanning Screening can reduce mortality in persons aged 55-80 who have smoked at least 30 pack-years and who are either still smoking or have quit in the past 15 years.  Check smoking status in past   Abdominal Aortic Aneurysm (AAA) screening    Ultrasound (US)   An aneurysm treated before rupture is very safe -a ruptured aneurysm can be fatal.  Screening  by US for AAA is limited to patients who meet one of the following criteria:    Men who are 65-75 years old and have smoked more than 100 cigarettes in their lifetime    Anyone with a family history of abdominal aortic aneurysm  No; is up to date.     MEDICARE WELLNESS EXAM PATIENT INSTRUCTIONS    Yearly exam:     See your health care provider every year in order to review changes in your health, review medicines that you take, and discuss preventive care needs such as immunizations and cancer screening.    Get a flu shot each year.     Advance Directive:    If you have not done so, you are encouraged to complete an advance directive. If you would like support with this, please contact the clinic  through the main clinic line. More information about advance directives can be found at:     [email or web address]    Nutrition:     Eat at least 5 servings of fruits and vegetables each day.     Eat whole-grain bread, whole-wheat pasta and brown rice instead of white grains and rice.     Talk to your doctor about Calcium and Vitamin D.     Lifestyle:    Exercise for at least 150 minutes  a week (30 minutes a day, 5 days a week). This will help you control your weight and prevent disease.     Limit alcohol to one drink per day.     If you smoke, try to quit - your doctor will be happy to help.     Wear sunscreen to prevent skin cancer.     See your dentist every six months for an exam and cleaning.     See your eye doctor every 1 to 2 years to screen for conditions such as glaucoma, macular degeneration and cataracts.

## 2019-09-03 ENCOUNTER — OFFICE VISIT (OUTPATIENT)
Dept: FAMILY MEDICINE | Facility: CLINIC | Age: 69
End: 2019-09-03
Payer: COMMERCIAL

## 2019-09-03 VITALS
OXYGEN SATURATION: 95 % | HEART RATE: 89 BPM | RESPIRATION RATE: 16 BRPM | TEMPERATURE: 98.1 F | BODY MASS INDEX: 53.57 KG/M2 | SYSTOLIC BLOOD PRESSURE: 144 MMHG | WEIGHT: 315 LBS | DIASTOLIC BLOOD PRESSURE: 92 MMHG

## 2019-09-03 DIAGNOSIS — F41.8 DEPRESSION WITH ANXIETY: ICD-10-CM

## 2019-09-03 DIAGNOSIS — Z71.89 ADVANCED DIRECTIVES, COUNSELING/DISCUSSION: ICD-10-CM

## 2019-09-03 DIAGNOSIS — Z00.00 MEDICARE ANNUAL WELLNESS VISIT, INITIAL: Primary | ICD-10-CM

## 2019-09-03 DIAGNOSIS — M86.9 OSTEOMYELITIS OF RIGHT FOOT, UNSPECIFIED TYPE (H): ICD-10-CM

## 2019-09-03 DIAGNOSIS — I50.32 CHRONIC HEART FAILURE WITH PRESERVED EJECTION FRACTION (H): ICD-10-CM

## 2019-09-03 LAB
HBV CORE AB SERPL QL IA: NONREACTIVE
HBV SURFACE AG SERPL QL IA: NONREACTIVE
HIV 1+2 AB+HIV1 P24 AG SERPL QL IA: NONREACTIVE

## 2019-09-03 ASSESSMENT — ANXIETY QUESTIONNAIRES
3. WORRYING TOO MUCH ABOUT DIFFERENT THINGS: NEARLY EVERY DAY
7. FEELING AFRAID AS IF SOMETHING AWFUL MIGHT HAPPEN: NEARLY EVERY DAY
2. NOT BEING ABLE TO STOP OR CONTROL WORRYING: NEARLY EVERY DAY
GAD7 TOTAL SCORE: 19
1. FEELING NERVOUS, ANXIOUS, OR ON EDGE: NEARLY EVERY DAY
5. BEING SO RESTLESS THAT IT IS HARD TO SIT STILL: SEVERAL DAYS
6. BECOMING EASILY ANNOYED OR IRRITABLE: NEARLY EVERY DAY

## 2019-09-03 ASSESSMENT — PATIENT HEALTH QUESTIONNAIRE - PHQ9
SUM OF ALL RESPONSES TO PHQ QUESTIONS 1-9: 25
5. POOR APPETITE OR OVEREATING: NEARLY EVERY DAY

## 2019-09-03 NOTE — LETTER
September 4, 2019      Clarke Moreira  [address]  [facility] [address]        Dear Clarke,    Thank you for getting your care at [facility]. Please see below for your test results.  Your hepatitis B serologies are normal  Your HIV is good      Resulted Orders   HIV Antigen Antibody Combo   Result Value Ref Range    HIV Antigen Antibody Combo Nonreactive NR^Nonreactive          Comment:      HIV-1 p24 Ag & HIV-1/HIV-2 Ab Not Detected   Hepatitis B core antibody   Result Value Ref Range    Hepatitis B Core Brenda Nonreactive NR^Nonreactive   Hepatitis B surface antigen   Result Value Ref Range    Hep B Surface Agn Nonreactive NR^Nonreactive           Sincerely,    Matthias Sanchez MD

## 2019-09-03 NOTE — PROGRESS NOTES
>65 year old Wellness Visit ( Medicare etc)         HPI       This 69 year old male presents as an established patient  Matthias Sanchez who presents for a  Annual Wellness Exam.    Other issues patient wants to address today:    Skin lesion on R leg  R great toe - osteomyelitis  Depression / anxiety        Visual Acuity: :  Testing not done declined by patient    Patient Active Problem List   Diagnosis     Atrial fibrillation (H)     Hypothyroidism     Basal cell carcinoma of skin of face     CTS (carpal tunnel syndrome)     Myopia     Plantar fasciitis     Presbyopia     Regular astigmatism     Neoplasm of uncertain behavior of skin     Venous stasis     Type 2 diabetes mellitus with hyperglycemia, without long-term current use of insulin (H)     Morbid obesity (H)     Depression with anxiety     Hyperlipidemia LDL goal <100     Fall     BiPAP (biphasic positive airway pressure) dependence     Lymphedema     Chronic systolic congestive heart failure (H)     Essential hypertension with goal blood pressure less than 140/90     Urinary retention     Constipation, unspecified constipation type     Cellulitis     Onychomycosis     (HFpEF) heart failure with preserved ejection fraction (H)     Chronic hypercapnic respiratory failure (H)     Hypoventilation associated with obesity syndrome (H)     Pre-syncope     Elevated serum creatinine     PAD (peripheral artery disease) (H)     THONG (obstructive sleep apnea)     Hypotension     Adequate nutrition       Past Medical History:   Diagnosis Date     Atrial fibrillation (H)      Basal cell carcinoma      Chronic anticoagulation      Diastolic heart failure (H)      Dyslipidemia      Essential hypertension      Morbid obesity (H)      Sleep-disordered breathing      Type 2 diabetes mellitus (H)         Family History   Problem Relation Age of Onset     Depression Mother      Glaucoma Maternal Grandfather      Cancer No family hx of         No family history of skin cancer      Macular Degeneration No family hx of          Problem List, Family History and past Medical History reviewed and unchanged/updated.       Health risk Assessment/ Review of Systems:         Constitutional:   Fevers or night sweats?  NO      Eyes:   Vision problems?  YES-needs new glasses            Hearing:  Do you feel you have hearing loss?  NO  Cardiovascular:  Chest pain, palpitations, or pain with walking?  NO        Respiratory:   Breathing problems or cough?  NO    :   Difficulty controlling urination?  NO        Musculoskeletal:   Stiffness or sore joints?  NO            Skin:   concerning lesions or moles?  Yes growths on back         Nervous System:   loss of strength or sensation,  numbness or tingling,  tremor,  dizziness,  headache?  NO         Mental Health:   depression or anxiety,  sleep problems?  YES  Cognition:  Memory problems?  yes      Weight Loss: Have you lost 10 or more pounds unintentionally in the previous year?  NO  Energy: How much of the time during the past 3 weeks did you feel tired?   (check one of the following):     All of the time  x Most of the time  ? Some of the time  ? A little of the time  ? None of the Time    PHQ-2 Score:   PHQ-2 ( 1999 Pf[employer]) 9/3/2019 8/27/2019   Q1: Little interest or pleasure in doing things 3 3   Q2: Feeling down, depressed or hopeless 3 3   PHQ-2 Score 6 6       PHQ-9 Score:   [facility] Follow-up to PHQ 12/11/2018 5/10/2019 8/27/2019   PHQ-9 9. Suicide Ideation past 2 weeks Nearly every day Nearly every day More than half the days   Thoughts of suicide or self harm in past 2 weeks - - No   PHQ-9 Safety concerns? - - No     Medical Care:     What other specialists or organizations are involved in your medical care?     Patient Care Team       Relationship Specialty Notifications Start End    Matthias Sanchez MD PCP - General Family Practice  8/30/13     Phone: [phone] Fax: [phone]         [address] [address]     Jada Mckeon, REAGAN CNP Nurse Practitioner Cardiology Admissions 1/16/18     [facility]    Phone: [phone] Pager: [phone] Fax: [phone]        [facility] [address]    Marta Heath, RN Nurse Coordinator Cardiology Admissions 1/16/18     [facility]    Phone: [phone] Fax: [phone]        Sybil Norman, REAGAN CNP Nurse Practitioner Cardiology Admissions 6/13/18     Phone: [phone] Fax: [phone]         [address] [place] [address]    Laura Phan, RN Nurse Coordinator Cardiology Admissions 11/28/18     [facility]    Phone: [phone]         Elizabeth Ochoa    1/4/19     Manas Argueta RN Specialty Care Coordinator Cardiology  2/27/19     Phone: [phone] Pager: [phone]        [facility] ([facility] ([place])  3/28/19     Phone: [phone]         Mary Carmen Hill APRN CNP Assigned PCP   5/9/19     Phone: [phone] Fax: [phone]          [facility] [address]    Jennifer Lou PA-C Physician Assistant Physician Assistant - Medical  5/13/19     Phone: [phone] Fax: [phone]         [address] [facility] [address]          Do you have an advanced directive? yes      Social History / Home Safety     Social History     Tobacco Use     Smoking status: Never Smoker     Smokeless tobacco: Never Used   Substance Use Topics     Alcohol use: No     Comment: Former alcohol abuse. Quit 70s then quit later in 80s-present     Marital Status:Single  Who lives in your household? self  Does your home have any of the following safety concerns? Loose rugs in the hallway, no grab bars in the bathroom, no handrails on the stairs or have poorly lit areas?  No   Do you feel threatened or controlled by a partner, ex-partner or anyone in your life? {Yes No   Has anyone hurt you physically, for example by pushing, hitting, slapping or kicking you   or forcing you to have sex? No        Functional Status     Do you need help with dressing yourself, bathing, or walking?No   Do you need help with the phone, transportation, shopping, preparing meals, housework, laundry, medications or managing money? YES   By yourself and not using aids, do you have any difficulty walking up 10 steps  without resting?  YES   By yourself and not using aids, do you have any difficulty walking several hundred yards?  YES               Risk Behaviors and Healthy Habits     History   Smoking Status     Never Smoker   Smokeless Tobacco     Never Used     How many servings of fruits and vegetables do you eat a day? 1  Exercise:No exercise None  Do you frequently drive without a seatbelt? No   History   Smoking Status     Never Smoker   Smokeless Tobacco     Never Used     Do you use any other drugs? No       Do you use alcohol?Yes  Number of drinks per month 1  Number of drinking days a week       Functional Ability   Was the patient's timed Up & Go test (Get up from chair walk, 10 feet turn, return to chair and sit down) unsteady or longer than 10 seconds? No     Fall risk:     1. Have you fallen two or more times in the past year? No   2. Have you fallen and had an injury in the past year?  No         Evaulation of Cognitive Function     Family member/caregiver input: Normal    1) Repeat 3 items (Leader, Season, Table)    2) Clock draw: \NORMAL  3) 3 item recall: Recalls 1 object   Results: ABNORMAL clock, 1-2 items recalled: PROBABLE COGNITIVE IMPAIRMENT,    Mini-CogTM Copyright S Leroy. Licensed by the author for use in [facility]; reprinted with permission ([email or web address]). All rights reserved.            Other Assessments:     CV Risk based on Pooled Cohort Risk (consider assessing every 4-6 years; consider statin in patients with 10-yr risk > 7.5%):   The ASCVD Risk score (Nora HRASHAL Jr., et al., 2013) failed to calculate for the following reasons:    The valid total cholesterol range is 130 to 320  mg/dL    Advance Directives: Discussed with patient and family as appropriate.  Has patient completed advance directives? Yes: Advance Directive has been received and scanned.          Immunization History   Administered Date(s) Administered     Influenza (IIV3) PF 12/15/2005, 03/08/2007, 01/25/2008, 10/01/2010, 12/02/2011, 09/06/2017     Mantoux Tuberculin Skin Test 10/09/2017, 03/23/2019     Pneumo Conj 13-V (2010&after) 11/27/2017     Pneumococcal 23 valent 03/24/2019     TDAP Vaccine (Boostrix) 11/27/2017     Tdap (Adacel,Boostrix) 03/08/2007     Reviewed Immunization Record Today    Physical Exam     Vitals: BP (!) 144/92   Pulse 89   Temp 98.1  F (36.7  C) (Oral)   Resp 16   Wt (!) 179.2 kg (395 lb)   SpO2 95%   BMI 53.57 kg/m    BMI= Body mass index is 53.57 kg/m .     Wt Readings from Last 5 Encounters:   09/03/19 (!) 179.2 kg (395 lb)   08/27/19 (!) 178.8 kg (394 lb 3.2 oz)   06/13/19 (!) 173.9 kg (383 lb 6.4 oz)   05/20/19 (!) 173.9 kg (383 lb 6.4 oz)   05/16/19 (!) 174.5 kg (384 lb 12.8 oz)         GENERAL APPEARANCE: alert and no distress  HENT: ear canals patent and TM's normal; mouth without ulcers or lesions; and oral mucous membranes moist  Hearing loss screen:   Normal   DENTITION:  Good repair?  yes  RESP: lungs clear to auscultation - no rales, rhonchi or wheezes  CV: regular rates and rhythm, no murmur, click or rub, no peripheral edema and peripheral pulses strong  SKIN: no suspicious lesions or rashes on back. Prob AK on R thigh.   NEURO: Normal strength and tone  MENTAL STATUS EXAM  Appearance: appropriate  Attitude: cooperative  Behavior: normal  Eye Contact: normal  Speech: normal  Orientation: oreinted to person , place, time and situation  Mood:  good  Affect: Mood Congruent  Thought Process: clear        Assessment and Plan     1. Medicare annual wellness visit, initial  Fall risk - low  Cognitive Screen - possible issue - 1/3 on memory, clock good  Advanced Directives - DNR/DNI -  in EMR  Vision - old glasses -struggling to get new glasses - cost/timing  Hearing - OK  Feet - Neuropathy with osteomyletitis  Dental - referred to Dental School  Screening - reviewed. FIT test given, shingrix recommended      - HIV Antigen Antibody Combo  - Hepatitis B core antibody  - Hepatitis B surface antigen    2. Chronic heart failure with preserved ejection fraction (H)  Sp[facility]olactone [facility]  No improvement in wt yet      3. Osteomyelitis of right foot, unspecified type (H)  On antibiotics  Has ID Consult on Sept 18    4. Depression with anxiety  PHQ9= high, Q9=3.  Long discussion. Stable. No suicidal plan / intent.     5. Skin lesion - R thigh  Needs shave excision - biopsy and catching on clothes                Options for treatment and follow-up care were reviewed with the Clarke Moreira and/or guardian engaged in the decision making process and verbalized understanding of the options discussed and agreed with the final plan.    Matthias Sanchez MD

## 2019-09-03 NOTE — PATIENT INSTRUCTIONS
Scheduling:  If you had an XRay/CT/Ultrasound/MRI ordered the number is [phone] to schedule or change your radiology appointment.   [employer] [employer] MEDICARE PERSONAL PREVENTIVE SERVICES PLAN - SERVICES  For Men          Review these tests with your doctor then decide which ones you want and take this page home for your reference          Immunization History   Administered Date(s) Administered     Influenza (IIV3) PF 12/15/2005, 03/08/2007, 01/25/2008, 10/01/2010, 12/02/2011, 09/06/2017     Mantoux Tuberculin Skin Test 10/09/2017, 03/23/2019     Pneumo Conj 13-V (2010&after) 11/27/2017     Pneumococcal 23 valent 03/24/2019     TDAP Vaccine (Boostrix) 11/27/2017     Tdap (Adacel,Boostrix) 03/08/2007                                                                                             IMMUNIZATIONS Description Recommend today?      Influenza (flu shot) Helps to prevent flu; you should get it every year Not available yet   PCV 13 Prevents pneumonia; given once No; is up to date.   PPSV 23 Prevents pneumonia; given once No; is up to date.   Tetanus Prevents tetanus; need every 10 years No; is up to date.   Herpes Zoster (shingles) Prevents or lessens symptoms from shingles; given once.  ++Offer++  If you want this vaccine please go to a pharmacy to receive Shinrix   Hepatitis B  If you have any of the following risk factors you should be immunized for hepatitis B: severe kidney disease, people who live in the same house as a carrier of Hepatitis B virus, people who live in  institutions (e.g. nursing homes or group homes), homosexual men, patients with hemophilia who received Factor VIII or IX concentrates, abusers of illicit injectable drugs Vaccinated years ago at work  Check serology  today           Health Maintenance   Topic Date Due     HF ACTION PLAN  1950     ADVANCE CARE PLANNING  1950     DEPRESSION ACTION PLAN  1950     ZOSTER IMMUNIZATION (1 of 2) 05/24/2000     MEDICARE ANNUAL WELLNESS  VISIT  05/24/2015     MICROALBUMIN  11/10/2018     A1C  05/26/2019     EYE EXAM  06/12/2019     FALL RISK ASSESSMENT  06/12/2019     LIPID  08/28/2019     INFLUENZA VACCINE (1) 09/01/2019     FIT  09/09/2019     PHQ-9  11/27/2019     BMP  02/27/2020     ALT  03/06/2020     CBC  04/17/2020     DIABETIC FOOT EXAM  05/16/2020     TSH W/FREE T4 REFLEX  03/12/2021     DTAP/TDAP/TD IMMUNIZATION (3 - Td) 11/27/2027     HEPATITIS C SCREENING  Completed     PNEUMOCOCCAL IMMUNIZATION 65+ LOW/MEDIUM RISK  Completed     AORTIC ANEURYSM SCREENING (SYSTEM ASSIGNED)  Completed     IPV IMMUNIZATION  Aged Out     MENINGITIS IMMUNIZATION  Aged Out            SCREENING TESTS       Description    Year of Last Screening    Recommended Today?   Heart disease screening blood tests    Cholesterol level Reducing cholesterol can reduce your risk of heart attacks by 25%.  Screening is recommended yearly if you are at risk of heart disease otherwise every 4-5 years   No; is up to date.      Diabetes screening tests    Hemoglobin A1c blood test    Finding and treating diabetes early can reduce complications.  Screening recommended/covered yearly if you have high blood pressure, high cholesterol, obesity (BMI >30), or a history of high blood glucose tests; or 2 of the following: family history of diabetes, overweight (BMI >25 but <30), age 65 years or older, and a history of diabetes of pregnancy or gave birth to baby weighing more than 9 lbs.      No; is up to date.   Hepatitis B screening Finding hepatitis B early can reduce complications.  Screening is recommended for persons with selected risk factors.   serology   Hepatitis C screening Finding hepatitis C early can reduce complications.  Screening is recommended for all persons born from 1945 through 1965 and for those with selected other risk factors.    No; is up to date.   HIV screening Finding HIV early can reduce complications.  Screening is recommended for persons with risk factors  for HIV infection.   offer   Glaucoma screening Early detection of glaucoma can prevent blindness.   Please talk to your eye doctor about this.         SCREENING TESTS       Description    Year of Last Screening    Recommended Today?   Colorectal cancer screening    Fecal occult blood test     Screening colonoscopy Screening for colon cancer has been shown to reduce death from colon cancer by 25-30%. Screening recommended to start at 50 years and continuing until age 75 years.     Offer iFIT   Screening for Lung Cancer     Low-dose CT scanning Screening can reduce mortality in persons aged 55-80 who have smoked at least 30 pack-years and who are either still smoking or have quit in the past 15 years.   Check smoking status in past   Abdominal Aortic Aneurysm (AAA) screening    Ultrasound (US)    An aneurysm treated before rupture is very safe -a ruptured aneurysm can be fatal.  Screening  by US for AAA is limited to patients who meet one of the following criteria:    Men who are 65-75 years old and have smoked more than 100 cigarettes in their lifetime    Anyone with a family history of abdominal aortic aneurysm   No; is up to date.      MEDICARE WELLNESS EXAM PATIENT INSTRUCTIONS     Yearly exam:     See your health care provider every year in order to review changes in your health, review medicines that you take, and discuss preventive care needs such as immunizations and cancer screening.    Get a flu shot each year.      Advance Directive:    If you have not done so, you are encouraged to complete an advance directive. If you would like support with this, please contact the clinic  through the main clinic line. More information about advance directives can be found at:     [email or web address]     Nutrition:     Eat at least 5 servings of fruits and vegetables each day.     Eat whole-grain bread, whole-wheat pasta and brown rice instead of white grains  and rice.     Talk to your doctor about Calcium and Vitamin D.      Lifestyle:    Exercise for at least 150 minutes a week (30 minutes a day, 5 days a week). This will help you control your weight and prevent disease.     Limit alcohol to one drink per day.     If you smoke, try to quit - your doctor will be happy to help.     Wear sunscreen to prevent skin cancer.     See your dentist every six months for an exam and cleaning.     See your eye doctor every 1 to 2 years to screen for conditions such as glaucoma, macular degeneration and cataracts.

## 2019-09-03 NOTE — LETTER
September 9, 2019      Clarke Moreira  [address]  [place] [address]        Dear Clarke,    Thank you for getting your care at [facility]. Please see below for your test results.  Your HIV test was good  Your Hepatitis B serologies show you have never been exposed or vaccinated. You are a candidate for the vaccine series. We can discuss at a future visit.    Resulted Orders   HIV Antigen Antibody Combo   Result Value Ref Range    HIV Antigen Antibody Combo Nonreactive NR^Nonreactive          Comment:      HIV-1 p24 Ag & HIV-1/HIV-2 Ab Not Detected   Hepatitis B core antibody   Result Value Ref Range    Hepatitis B Core Brenda Nonreactive NR^Nonreactive   Hepatitis B surface antigen   Result Value Ref Range    Hep B Surface Agn Nonreactive NR^Nonreactive   Hepatitis B Surface Antibody   Result Value Ref Range    Hepatitis B Surface Antibody 4.42 <8.00 m[IU]/mL      Comment:      Nonreactive, No antibody detected when the value is less than 8.00 m[IU]/mL.           Sincerely,    Matthias Sanchez MD

## 2019-09-04 ASSESSMENT — ANXIETY QUESTIONNAIRES: GAD7 TOTAL SCORE: 19

## 2019-09-05 ENCOUNTER — OFFICE VISIT (OUTPATIENT)
Dept: WOUND CARE | Facility: CLINIC | Age: 69
End: 2019-09-05
Payer: COMMERCIAL

## 2019-09-05 DIAGNOSIS — L97.511 SKIN ULCER OF TOE OF RIGHT FOOT, LIMITED TO BREAKDOWN OF SKIN (H): Primary | ICD-10-CM

## 2019-09-05 DIAGNOSIS — E11.65 TYPE 2 DIABETES MELLITUS WITH HYPERGLYCEMIA, WITHOUT LONG-TERM CURRENT USE OF INSULIN (H): ICD-10-CM

## 2019-09-05 LAB — HBV SURFACE AB SERPL IA-ACNC: 4.42 M[IU]/ML

## 2019-09-05 NOTE — PROGRESS NOTES
Patient comes to wound clinic for wound assessment per request of dr. Lagos. He has history of a Diabetic foot ulcer  Clean gloves are donned and current dressing removed.       Previous treatment includes: dressing changed  1 dayago  First date of treatment:    Objective findings:  A ulcer wound is noted at right  distal hallux measuring 0.4cm x 0.3cm x 0.1cm  Wound base: Red/Granulation   Edges: hyperkeratotic  Drainage: slight  Odor: no  Undermining: no  Bone Exposure: No  Tenderness:not    Slough appearance:  none       Eschar appearance:  none       Periwound Skin: maceration and fibrotic,      Compared to last week         Subjective finding:     Pt states: doing well    Patient is assessed for discomfort which is: absent    Today's status of the wound: healing    Treatment: Removal of existing dressing, Visual inspection, Cleansing with technicare solution, Irrigation with saline solution, Wound packing with iodofoam and optifoam Application of clean dressing         Pt received the following instructions:    Signs and symptoms of infection taught.  Patient needs to be seen 1 week. Treated and follow-up appointment made. Dr. Lagos was available for supervision of care if needed or if questions should arise and regarding plan of care.  Becca Mccullough RN, CWON

## 2019-09-05 NOTE — LETTER
9/5/2019       RE: Clarke Moreira  [address]  [place] [address]     Dear Colleague,    Thank you for referring your patient, Clarke Moreira, to the [facility] WOUND OSTOMY at [facility]. Please see a copy of my visit note below.        Patient comes to wound clinic for wound assessment per request of dr. Lagos. He has history of a Diabetic foot ulcer  Clean gloves are donned and current dressing removed.       Previous treatment includes: dressing changed  1 dayago  First date of treatment:    Objective findings:  A ulcer wound is noted at right  distal hallux measuring 0.4cm x 0.3cm x 0.1cm  Wound base: Red/Granulation   Edges: hyperkeratotic  Drainage: slight  Odor: no  Undermining: no  Bone Exposure: No  Tenderness:not    Slough appearance:  none       Eschar appearance:  none       Periwound Skin: maceration and fibrotic,      Compared to last week         Subjective finding:     Pt states: doing well    Patient is assessed for discomfort which is: absent    Today's status of the wound: healing    Treatment: Removal of existing dressing, Visual inspection, Cleansing with technicare solution, Irrigation with saline solution, Wound packing with iodofoam and optifoam Application of clean dressing         Pt received the following instructions:    Signs and symptoms of infection taught.  Patient needs to be seen 1 week. Treated and follow-up appointment made. Dr. Lagos was available for supervision of care if needed or if questions should arise and regarding plan of care.  Becca Mccullough RN, CWON

## 2019-09-09 ENCOUNTER — OFFICE VISIT (OUTPATIENT)
Dept: PSYCHOLOGY | Facility: CLINIC | Age: 69
End: 2019-09-09
Payer: COMMERCIAL

## 2019-09-09 DIAGNOSIS — F41.1 GAD (GENERALIZED ANXIETY DISORDER): ICD-10-CM

## 2019-09-09 DIAGNOSIS — F33.1 MODERATE EPISODE OF RECURRENT MAJOR DEPRESSIVE DISORDER (H): Primary | ICD-10-CM

## 2019-09-09 NOTE — PROGRESS NOTES
Behavioral Health Progress Note    Client Legal Name: Clarke Moreira   Client Preferred Name: Clarke   Service Type: Individual  Length of Visit: 60 minutes  Attendees: patient     Identifying Information and Presenting Problem:    The patient is a 68 year old American male who is being seen for problematic symptoms of depression, ongoing adjustment to medical illness, as well as social isolation.  Clarke has continued to struggle more with his mood with the recent developments with his health.      Treatment Objective(s) Addressed in This Session:                Clarke will practice a healthy daily discipline of diet and exercise.    Clarke will make and keep appointments with appropriate time limits to reduce disabling anxiety about leaving the house.     Progress on / Status of Treatment Objective(s) / Homework:  Clarke is seen today for f/u following a visit with his cardiologist where he endorsed Q9 on the PHQ-9    PHQ-9 SCORE 5/10/2019 8/27/2019 9/3/2019   PHQ-9 Total Score - - -   PHQ-9 Total Score 22 23 25       CHRIS-7 SCORE 12/11/2018 5/10/2019 9/3/2019   Total Score - - -   Total Score 20 13 19     Topics Discussed/Interventions Provided:    A little over a week ago Clarke was seeing his cardiologist for a visit and completed a PHQ-9.  He scored a 23 on this screener and Q9 was positive.  The cardiologist's office reached out to Clarke's team here at [facility] to work together to develop a plan to address next steps related to suicidal thoughts.  Please see telephone note dated 8/27 for more information.      Clarke provided his perception and thoughts about all that occurred that day.  He initially felt defensive as he thought the team there was judging him and his team for the fact that the was receiving treatment for depression and his score was still high.  We discussed the useful and many limitations of the PHQ-9, as well as the complexities of his case that lead to a PHQ-9 score that is  high, even though there are aspects of his thinking and life that he sees as improving.  Shared my perspective that I was grateful and impressed that the team there had reached out to us to get our input.  We were able to provide more background information and formulate a follow-up plan that did not involve going to the ER for further evaluation.  Clarke felt very cared for in terms of everybody's response to what happened that day.    PATIENT RISK ASSESSMENT:  Today Clarke Moreira reports passive suicidal thoughts. In addition, he has notable risk factors for self-harm, including lives alone/ isolated, new/ worsening medical issue and male. However, risk is mitigated by no plan or intent, h/o seeking help when needed, future oriented and states he would not be able to do anything to harm himself. Therefore, based on all available evidence including the factors cited above, he does not appear to be at imminent risk for self-harm, does not meet criteria for a 72-hr hold, and therefore involuntary hospitalization will not be pursued at this  time. Clarke will continue to follow with behavioral health here and Dr. Sanchez for medications.  Clarke Moreira has phone numbers for emergency mental health services and was encouraged to call these local crisis numbers, 911, or visit a local emergency room if thoughts of suicide or homicide were to arise and/or if he were to be in acute distress. The patient agreed to utilize these services as indicated if the need were to arise.    Assessment: The patient appeared to be active and engaged in today's session and was receptive to feedback.    Mental Status: Clarke appeared alert and oriented. He was casually dressed and appropriately groomed.  Eye contact was good.  Speech was normal rate and rhythm.  Clarke uses a walker for mobility.  Mood was described as down.  Affect appeared less down than previous visit.  Thought processes were logical and coherent.  Thought  content was WNL with no evidence of psychotic or paranoid features. No evidence of SI/HI or self-harm, intent, or plans. Memory appeared grossly intact. Insight and judgment appeared good and patient exhibited good impulse control during the appointment.     Does the patient appear to be at imminent risk of harm to self/others at this time? No    The session was necessary to identify and challenge unhelpful thinking that occurs regarding his perception of himself and reinforced his ability to reframe these thoughts.  Symptoms of depression and anxiety have continued to interfere with his ability to function at home and cause distress that can interfere with his ability to care for himself.  Ongoing psychotherapy is necessary to provide counseling and provide support.    Diagnosis (DSM-5):  Major Depression, recurrent, moderate  Generalized Anxiety Disorder  R/o persistent depressive disorder    Plan:  1. Return in two weeks.      NOTE: Treatment plan updated needed on 8/12/20.  Diagnostic assessment update due 10/15/19.

## 2019-09-16 ENCOUNTER — OFFICE VISIT (OUTPATIENT)
Dept: PSYCHOLOGY | Facility: CLINIC | Age: 69
End: 2019-09-16
Payer: COMMERCIAL

## 2019-09-16 DIAGNOSIS — F33.1 MODERATE EPISODE OF RECURRENT MAJOR DEPRESSIVE DISORDER (H): Primary | ICD-10-CM

## 2019-09-16 DIAGNOSIS — F41.1 GAD (GENERALIZED ANXIETY DISORDER): ICD-10-CM

## 2019-09-16 NOTE — PROGRESS NOTES
Behavioral Health Progress Note    Client Legal Name: Clarke Moreira   Client Preferred Name: Clarke   Service Type: Individual  Length of Visit: 45 minutes  Attendees: patient     Identifying Information and Presenting Problem:    The patient is a 68 year old American male who is being seen for problematic symptoms of depression, ongoing adjustment to medical illness, as well as social isolation.  Clarke has continued to struggle more with his mood with the recent developments with his health.      Treatment Objective(s) Addressed in This Session:                Clarke will practice a healthy daily discipline of diet and exercise.    Clarke will make and keep appointments with appropriate time limits to reduce disabling anxiety about leaving the house.     Progress on / Status of Treatment Objective(s) / Homework:  Clarke states mood is about the same today.    PHQ-9 SCORE 5/10/2019 8/27/2019 9/3/2019   PHQ-9 Total Score - - -   PHQ-9 Total Score 22 23 25       CHRIS-7 SCORE 12/11/2018 5/10/2019 9/3/2019   Total Score - - -   Total Score 20 13 19     Topics Discussed/Interventions Provided:    Clarke asked some questions about insurance coverage and number of sessions available.  Discussed that while we typically have not run into limits on session numbers for the past several years, it would be important to contact his insurance company to find out if there are any parameters.    Clarke asked about treatment plan going forward.  Discussed utilizing a more formalized CBT approach. This has been difficult due to the number of concerns that Clarke would like to discuss at each session. While CBT lens is used when problem solving and addressing these concerns, we have not followed a more protocolized approach. Discussed pros and drawbacks of pursuing a more protocol based CBT approach.  Clarke is interested in doing this.  Agreed that we would start this at our next visit.    Clarke shared his concerns  "about doing therapy in this way as he worries he will \"fail.\" He states he has never been able to make any changes previously in his life.  Challenged his thinking about this and provided examples of change that I have heard from the history he has provided as well during the short time we have worked together.  Discussed that the narrative he has created of himself is much more stagnant than what seems to have happened.    Assessment: The patient appeared to be active and engaged in today's session and was receptive to feedback.    Mental Status: Clarke appeared alert and oriented. He was casually dressed and appropriately groomed.  Eye contact was good.  Speech was normal rate and rhythm.  Clarke uses a walker for mobility.  Mood was described as down.  I think Clarke works hard to make sure his external presentation appears less depressed than he is experiencing internally.  Thought processes were logical and coherent.  Thought content was WNL with no evidence of psychotic or paranoid features. No evidence of SI/HI or self-harm, intent, or plans. Memory appeared grossly intact. Insight and judgment appeared good and patient exhibited good impulse control during the appointment.     Does the patient appear to be at imminent risk of harm to self/others at this time? No    The session was necessary to identify and challenge unhelpful thinking that occurs regarding his perception of himself and reinforced his ability to reframe these thoughts.  Symptoms of depression and anxiety have continued to interfere with his ability to function at home and cause distress that can interfere with his ability to care for himself.  Ongoing psychotherapy is necessary to provide counseling and provide support.    Diagnosis (DSM-5):  Major Depression, recurrent, moderate  Generalized Anxiety Disorder  R/o persistent depressive disorder    Plan:  1. Return in 1-2 weeks.  2. Clarke will call his insurance to see what the maximum " number of therapy sessions he is allowed each year.  3. Continue to follow with Dr. Sanchez for       NOTE: Treatment plan updated needed on 8/12/20.  Diagnostic assessment update due 10/15/19.

## 2019-09-18 ENCOUNTER — OFFICE VISIT (OUTPATIENT)
Dept: INFECTIOUS DISEASES | Facility: CLINIC | Age: 69
End: 2019-09-18
Payer: COMMERCIAL

## 2019-09-18 ENCOUNTER — TELEPHONE (OUTPATIENT)
Dept: INFECTIOUS DISEASES | Facility: CLINIC | Age: 69
End: 2019-09-18

## 2019-09-18 ENCOUNTER — TELEPHONE (OUTPATIENT)
Dept: FAMILY MEDICINE | Facility: CLINIC | Age: 69
End: 2019-09-18

## 2019-09-18 ENCOUNTER — PRE VISIT (OUTPATIENT)
Dept: INFECTIOUS DISEASES | Facility: CLINIC | Age: 69
End: 2019-09-18

## 2019-09-18 VITALS
HEART RATE: 76 BPM | TEMPERATURE: 98.7 F | OXYGEN SATURATION: 97 % | HEIGHT: 72 IN | DIASTOLIC BLOOD PRESSURE: 78 MMHG | WEIGHT: 315 LBS | BODY MASS INDEX: 42.66 KG/M2 | SYSTOLIC BLOOD PRESSURE: 124 MMHG

## 2019-09-18 DIAGNOSIS — E11.621 DIABETIC ULCER OF TOE OF RIGHT FOOT ASSOCIATED WITH TYPE 2 DIABETES MELLITUS, WITH FAT LAYER EXPOSED (H): ICD-10-CM

## 2019-09-18 DIAGNOSIS — M86.671 OTHER CHRONIC OSTEOMYELITIS OF RIGHT FOOT (H): Primary | ICD-10-CM

## 2019-09-18 DIAGNOSIS — L97.512 DIABETIC ULCER OF TOE OF RIGHT FOOT ASSOCIATED WITH TYPE 2 DIABETES MELLITUS, WITH FAT LAYER EXPOSED (H): ICD-10-CM

## 2019-09-18 PROCEDURE — G0463 HOSPITAL OUTPT CLINIC VISIT: HCPCS | Mod: ZF

## 2019-09-18 RX ORDER — LEVOFLOXACIN 750 MG/1
750 TABLET, FILM COATED ORAL DAILY
Qty: 42 TABLET | Refills: 0 | Status: SHIPPED | OUTPATIENT
Start: 2019-09-18 | End: 2019-10-15

## 2019-09-18 RX ORDER — METRONIDAZOLE 500 MG/1
500 TABLET ORAL 3 TIMES DAILY
Qty: 126 TABLET | Refills: 0 | Status: SHIPPED | OUTPATIENT
Start: 2019-09-18 | End: 2019-10-15

## 2019-09-18 ASSESSMENT — MIFFLIN-ST. JEOR: SCORE: 2572.61

## 2019-09-18 ASSESSMENT — PAIN SCALES - GENERAL: PAINLEVEL: EXTREME PAIN (9)

## 2019-09-18 NOTE — LETTER
9/18/2019      RE: Clarke Moreira  [address]  [facility] [id number]       [facility]  Infectious Disease Clinic Note:  New Patient     Patient:  Clarke Moreira, Date of birth 1950, Medical record number [id number]  Date of Visit:  09/20/2019  Consult requested by Dr. Jesús Lagos for evaluation of diabetic foot ulcer and right great toe osteomyelitis.         Assessment and Recommendations:     Assessment:    68 y/o M with DM, Afib on Warfarin, referred by Podiatry for Diabetic foot ulcer with right great toe osteomyeltis.     Based on exam and review of images, it might be worth trying a course of antibiotics as recommended by Podiatry. Culture grew mostly expected skin michael. Will treat with Levaquin and Flagyl to cover Pseudomonas, anaerobes and other expected pathogens in a diabetic foot infection.     Recommendations:  - Start Levaquin 750 mg daily plus Flagyl 500 mg TID x 6 weeks  - [facility] informed to watch his INR closely while on antibiotics  - Advised good blood sugar control to aid healing  - Follow up on 10/30/2019    Leopoldo Croft  PGY5 Infectious Diseases Fellow  Pager: [phone]         History of the Infectious Disease lllness:     Patient says his ulcer started in March when he was admitted for sepsis 2/2 a dental infection. During the admission, he was found to have what sounds like a callus/growth on the plantar aspect of his right great toe. Podiatry was consulted in the hospital and he underwent a debridement, which created the ulcer, which never ended up healing. He was found to have osteomyelitis of the distal phalanx. He was advised by Orthopedics to have the toe amputated. Patient went to Dr Lagos from Podiatry for a second opinion. Dr Lagos was of the opinion that he should get a trial of antibiotics, and that the wound can be expected to close up. He received 2 ten day courses of Augmentin. Was referred to us  for antibiotic recommendation. Podiatry also got a superficial wound culture. Patient reports no pain 2/2 peripheral neuropathy. He dresses it daily.     Review of Systems:  CONSTITUTIONAL:  No fevers or chills. No night sweats.  EYES: negative for icterus or acute vision changes.   ENT:  negative for hearing loss, tinnitus or sore throat  RESPIRATORY:  negative for cough, sputum, dyspnea  CARDIOVASCULAR:  negative for chest pain, heart palpitations  GASTROINTESTINAL:  negative for nausea, vomiting, diarrhea or constipation  GENITOURINARY:  negative for dysuria or hematuria.  HEME:  No easy bruising or bleeding  INTEGUMENT:  As above  NEURO:  Negative for headache or tremor.    Past Medical History:   Diagnosis Date     Atrial fibrillation (H)      Basal cell carcinoma      Chronic anticoagulation      Diastolic heart failure (H)      Dyslipidemia      Essential hypertension      Morbid obesity (H)      Sleep-disordered breathing      Type 2 diabetes mellitus (H)      Past Surgical History:   Procedure Laterality Date     BIOPSY OF SKIN LESION       MOHS MICROGRAPHIC PROCEDURE       PICC INSERTION Right 09/24/2017    5fr TL Bard PICC, 51cm (1cm external) in the R medial brachial vein w/ tip in the SVC.     Family History   Problem Relation Age of Onset     Depression Mother      Glaucoma Maternal Grandfather      Cancer No family hx of         No family history of skin cancer     Macular Degeneration No family hx of      Social History     Social History Narrative    Lives in an apartment in 16th floor near hospital.  Has elevators, unable to use stairs. Not able to shop; has things delivered to him including food. Difficulty completing cleaning. Goes to PCP once yearly for annual check up and medication review.     Social History     Tobacco Use     Smoking status: Never Smoker     Smokeless tobacco: Never Used   Substance Use Topics     Alcohol use: No     Comment: Former alcohol abuse. Quit 70s then quit later in  80s-present     Drug use: No     Comment: history of LSD use     Immunization History   Administered Date(s) Administered     Influenza (IIV3) PF 12/15/2005, 03/08/2007, 01/25/2008, 10/01/2010, 12/02/2011, 09/06/2017     Mantoux Tuberculin Skin Test 10/09/2017, 03/23/2019     Pneumo Conj 13-V (2010&after) 11/27/2017     Pneumococcal 23 valent 03/24/2019     TDAP Vaccine (Boostrix) 11/27/2017     Tdap (Adacel,Boostrix) 03/08/2007     Patient Active Problem List   Diagnosis     Atrial fibrillation (H)     Hypothyroidism     Basal cell carcinoma of skin of face     CTS (carpal tunnel syndrome)     Myopia     Plantar fasciitis     Presbyopia     Regular astigmatism     Neoplasm of uncertain behavior of skin     Venous stasis     Type 2 diabetes mellitus with hyperglycemia, without long-term current use of insulin (H)     Morbid obesity (H)     Depression with anxiety     Hyperlipidemia LDL goal <100     Fall     BiPAP (biphasic positive airway pressure) dependence     Lymphedema     Chronic systolic congestive heart failure (H)     Essential hypertension with goal blood pressure less than 140/90     Urinary retention     Constipation, unspecified constipation type     Cellulitis     Onychomycosis     (HFpEF) heart failure with preserved ejection fraction (H)     Chronic hypercapnic respiratory failure (H)     Hypoventilation associated with obesity syndrome (H)     Pre-syncope     Elevated serum creatinine     PAD (peripheral artery disease) (H)     THONG (obstructive sleep apnea)     Hypotension     Adequate nutrition     Osteomyelitis of right foot, unspecified type (H)     Advanced directives, counseling/discussion     Outpatient Medications Marked as Taking for the 9/18/19 encounter (Office Visit) with Leopoldo rCoft MD   Medication Sig     ammonium lactate (LAC-HYDRIN) 12 % external cream Apply topically 2 times daily as needed for dry skin     amoxicillin-clavulanate (AUGMENTIN) 875-125 MG tablet Take 1 tablet  by mouth 2 times daily     Ascorbic Acid (VITAMIN C) POWD Take 500 mcg by mouth daily     aspirin (ASA) 81 MG tablet Take 1 tablet (81 mg) by mouth daily     atorvastatin (LIPITOR) 40 MG tablet Take 1 tablet (40 mg) by mouth daily     bacitracin 500 UNIT/GM external ointment Apply 30 g topically 2 times daily     blood glucose (ONETOUCH ULTRA) test strip Use to test blood sugars 2 times daily or as directed.     blood glucose monitoring (ONE TOUCH DELICA) lancets Use to test blood sugars 2 times daily or as directed.     blood glucose monitoring (ONE TOUCH ULTRA MINI) meter device kit Use to test blood sugars 2 times daily or as directed.     levofloxacin (LEVAQUIN) 750 MG tablet Take 1 tablet (750 mg) by mouth daily     levothyroxine (SYNTHROID/LEVOTHROID) 175 MCG tablet Take 1 tablet (175 mcg) by mouth every morning (before breakfast)     metFORMIN (GLUCOPHAGE) 500 MG tablet Take 1 tablet (500 mg) by mouth 2 times daily (with meals)     metoprolol succinate ER (TOPROL-XL) 100 MG 24 hr tablet Take 1 tablet (100 mg) by mouth daily     metroNIDAZOLE (FLAGYL) 500 MG tablet Take 1 tablet (500 mg) by mouth 3 times daily     multivitamin w/minerals (MULTI-VITAMIN) tablet Take 1 tablet by mouth daily     naproxen (NAPROSYN) 500 MG tablet Take 1 tablet (500 mg) by mouth 2 times daily (with meals)     nystatin (MYCOSTATIN) 935050 UNIT/GM external cream Apply topically 2 times daily as needed for dry skin     omega 3 1000 MG CAPS Take 1 g by mouth daily     order for DME Equipment being ordered: Diabetes Shoes     order for DME Equipment being ordered: Custom diabetic shoes     order for DME Equipment being ordered: Bariatric Lift chair  Diagnosis - morbid obesity, CHF, Lymphedema    Fax to Ne from [facility] at [phone].     order for DME Equipment being ordered: Other: Velcro Compression stockings.   Treatment Diagnosis: bilateral lymphedema.     polyethylene glycol (MIRALAX/GLYCOLAX) packet Take 17 g by mouth daily  "as needed for constipation     potassium chloride ER (K-DUR/KLOR-CON M) 20 MEQ CR tablet Take 2 tablets (40 mEq) by mouth 2 times daily     senna-docusate (SENOKOT-S/PERICOLACE) 8.6-50 MG tablet Take 1-2 tablets by mouth 2 times daily as needed for constipation     spironolactone (ALDACTONE) 25 MG tablet Take 2 tablets (50 mg) by mouth daily     tamsulosin (FLOMAX) 0.4 MG capsule Take 1 capsule (0.4 mg) by mouth daily     torsemide (DEMADEX) 100 MG tablet Take 1 tablet (100 mg) by mouth daily Take one tab (100mg) by mouth every morning, take one half tab (50mg) by mouth every evening     traMADol (ULTRAM) 50 MG tablet Take 1 tablet (50 mg) by mouth every 6 hours as needed for severe pain     vitamin B complex with vitamin C (VITAMIN  B COMPLEX) tablet Take 1 tablet by mouth daily     vitamin E (VITAMIN E COMPLEX) 1000 units (450 mg) capsule Take 1 capsule (1,000 Units) by mouth daily     warfarin (COUMADIN) 5 MG tablet Take 12.5 mg by mouth  1x/week, Take 10 mg by mouth 6x per week     Current Facility-Administered Medications for the 9/18/19 encounter (Office Visit) with Leopoldo Croft MD   Medication     lidocaine 1% with EPINEPHrine 1:100,000 injection 3 mL     No Known Allergies         Physical Exam:     /78 (BP Location: Right arm, Patient Position: Sitting, Cuff Size: Adult Large)   Pulse 76   Temp 98.7  F (37.1  C) (Oral)   Ht 1.829 m (6' 0.01\")   Wt (!) 176.9 kg (390 lb 1.6 oz)   SpO2 97%   BMI 52.90 kg/m     Wt Readings from Last 4 Encounters:   09/18/19 (!) 176.9 kg (390 lb 1.6 oz)   09/03/19 (!) 179.2 kg (395 lb)   08/27/19 (!) 178.8 kg (394 lb 3.2 oz)   06/13/19 (!) 173.9 kg (383 lb 6.4 oz)     Exam:  GENERAL: well-developed, well-nourished, alert, oriented, in no acute distress.  HEAD: Head is normocephalic, atraumatic   EYES: Eyes have anicteric sclerae.    ENT: Oropharynx is moist without exudates or ulcers.  NECK: Supple.  LUNGS: Clear to auscultation.  CARDIOVASCULAR: Regular rate " and rhythm with no murmurs, gallops or rubs.  ABDOMEN: Normal bowel sounds, soft, nontender.  SKIN: No acute rashes.  NEUROLOGIC: Grossly nonfocal.         Laboratory Data:     Inflammatory Markers    Recent Labs   Lab Test 03/06/19  1728 03/06/19  1110 01/06/18  0728 01/05/18  1109   SED 19  --   --   --    CRP  --  5.9 8.9* 6.2     Metabolic Studies    Recent Labs   Lab Test 08/27/19  0826 05/20/19  1153 05/10/19  0807  03/23/19  0624  03/13/19  1701  06/04/18  1853  01/15/18  1021  09/23/17  1049    137 137.0   < > 140   < >  --    < > 142   < > 132.4*   < > 125*   POTASSIUM 3.8 4.0 4.2   < > 3.1*   < >  --    < > 4.9   < > 4.3   < > 3.8   CHLORIDE 102 101 101.2   < > 100   < >  --    < > 104   < > 95.0*   < > 87*   CO2 28 30 26.1   < > 30   < >  --    < > 30   < > 24.7   < > 29   ANIONGAP 6 7  --    < > 10   < >  --    < > 7   < >  --    < > 10   BUN 16 13 18.7   < > 22   < >  --    < > 14   < > 20.2   < > 10   CR 0.87 1.18 1.1   < > 0.99   < >  --    < > 0.94   < > 0.9   < > 0.77   GFRESTIMATED 88 63 70.8   < > 78   < >  --    < > 80   < > >90   < > >90   * 125* 133.1*   < > 112*   < >  --    < > 91   < > 115.1*   < > 120*   CHERI 8.9 10.0 9.1   < > 8.7   < >  --    < > 9.2   < > 10.0   < > 8.2*   PHOS  --   --   --   --   --   --   --   --   --   --  3.3   < >  --    MAG  --   --   --   --  2.2   < >  --    < > 2.0  --  2.4*   < > 1.5*   URIC  --   --   --   --   --   --   --   --  8.0*  --   --   --   --    LACT  --   --   --   --   --   --  1.5   < >  --   --   --    < >  --    CKT  --   --   --   --   --   --   --   --   --   --   --   --  84    < > = values in this interval not displayed.     Hepatic Studies    Recent Labs   Lab Test 03/06/19  1110 12/11/18  1125 08/28/18  1358 06/04/18  1853 01/05/18  1109   BILITOTAL 0.5 0.9 0.7 1.1 1.1   ALKPHOS 105 88.2 81.8 79 87   PROTTOTAL 7.5 8.0 7.6 6.8 7.6   ALBUMIN 3.5  --   --  3.3* 3.1*   AST 14 20.7 18.0 16 16   ALT 18 19.8 18.0 22 16      Pancreatitis testing    Recent Labs   Lab Test 08/28/18  1358 08/14/17  1306   TRIG 107.0 71.5     Lipid testing    Recent Labs   Lab Test 08/28/18  1358 08/14/17  1306 09/14/16  0939   CHOL 120.3 94.3 139.5   HDL 44.1 33.7* 36.6*   LDL 55 46 85   TRIG 107.0 71.5 87.1   CHOLHDLRATIO 2.7 2.8 3.8     Gout Labs      Recent Labs   Lab Test 06/04/18  1853   URIC 8.0*     Hematology Studies     Recent Labs   Lab Test 04/17/19  0914 03/16/19  0629 03/15/19  0537 03/14/19  0617 03/13/19  0613  03/06/19  1110  06/04/18  1853   WBC  --  7.7 9.1 7.6 8.9   < > 11.9*  --  9.3   ANEU  --   --   --   --   --   --  8.1  --  5.8   ALYM 1.9  --   --   --   --   --  2.5   < > 2.5   BARB  --   --   --   --   --   --  1.1  --  0.8   AEOS  --   --   --   --   --   --  0.0  --  0.2   HGB 13.8 14.7 13.9 13.9 13.7   < > 14.8   < > 13.5   HCT 46.0 45.9 43.6 43.6 43.1   < > 45.1   < > 40.8   PLT  --  238 242 239 247   < > 284  --  195    < > = values in this interval not displayed.     Clotting Studies    Recent Labs   Lab Test 08/27/19  0826 07/16/19  1158 07/02/19  1341 05/21/19  1112   INR 2.55* 1.91* 2.6 1.7     Iron Testing    Recent Labs   Lab Test 04/17/19  0914  12/26/17  1012   IRON  --   --  60   FEB  --   --  359   IRONSAT  --   --  17   MCV 96.9   < > 95    < > = values in this interval not displayed.     Arterial Blood Gas Testing    Recent Labs   Lab Test 09/30/17  0715 09/29/17  0629 09/29/17  0257 09/27/17  2356  09/24/17  0709 09/24/17  0530 09/24/17  0502   PH  --  7.41  --   --   --  7.29* 7.14* Canceled, Test credited   PCO2  --  70*  --   --   --  66* 112* Canceled, Test credited   PO2  --  67*  --   --   --  122* 58* Canceled, Test credited   HCO3  --  44*  --   --   --  32* 38* Canceled, Test credited   O2PER 30.0 30.0 60 30   < > 80.0 60.0 60.0  Canceled, Test credited    < > = values in this interval not displayed.      Thyroid Studies     Recent Labs   Lab Test 03/12/19  0625 12/11/18  1125 03/27/18  1320  01/06/18  0728 11/10/17  1608   TSH 1.38 11.20* 3.68 6.80* 4.89*   T4  --  1.21  --   --  1.60*     Urine Studies     Recent Labs   Lab Test 03/06/19  1726 11/10/17  1059 10/08/17  1059 09/23/17  1900   URINEPH 5.0 6.0 7.0 6.0   NITRITE Negative Negative Negative Negative   LEUKEST Negative  --  Negative Negative   WBCU 0  --  <1 <1     Medication levels    Recent Labs   Lab Test 03/08/19  1024   VANCOMYCIN 25.9*     Microbiology:  Last Culture results with specimen source  Culture Micro   Date Value Ref Range Status   08/06/2019 Light growth  Staphylococcus pseudintermedius   (A)  Final   08/06/2019 (A)  Final    Light growth  Coagulase negative Staphylococcus  Susceptibility testing not routinely done     08/06/2019 (A)  Final    Moderate growth  Corynebacterium striatum  Identification obtained by MALDI-TOF mass spectrometry research use only database. Test   characteristics determined and verified by the [facility]   ([facility]) [place], [place].  Susceptibility testing not routinely done     03/06/2019 No growth  Final   03/06/2019 No growth  Final   01/05/2018 No growth  Final   01/05/2018 No growth  Final   09/23/2017 No growth  Final   09/23/2017 No growth  Final   02/26/2008 No growth  Final   02/26/2008 No growth  Final   02/26/2008 No growth  Final   01/25/2008 Light growth Aspergillus niger  Final    Specimen Description   Date Value Ref Range Status   08/06/2019 Right Foot Toe Hallux Uler  Final   08/06/2019 Right Foot Toe Hallux uler  Final   03/07/2019 Nares  Final   03/06/2019 Blood Right Arm  Final   03/06/2019 Nasopharyngeal  Final   03/06/2019 Blood Unspecified Site  Final   01/05/2018 Blood Left Hand  Final   01/05/2018 Blood Right Hand  Final   09/29/2017 Nares  Final   09/23/2017 Blood Right Hand  Final   09/23/2017 Blood Left Hand  Final   02/26/2008 Blood Right Hand  Final   02/26/2008 Blood Right Hand  Final   02/26/2008 Blood Left Arm  Final   01/25/2008 Wound   Final   01/21/2007 Feces  Final        Virology:    Respiratory virus testing    Recent Labs   Lab Test 03/06/19  1117 09/24/17  0408   FLUAH3  --  Negative   HMPV  --  Negative   PIV3  --  Negative   HRVS  --  Negative   RVSPEC  --  Nasopharyngeal   RSVA  --  Negative   RSVB  --  Negative   IRSV Negative  --      Recent Labs   Lab Test 03/06/19  1117 09/24/17  0408   RVSPEC  --  Nasopharyngeal   IFLUA  --  Negative   IFLUB  --  Negative   INFZA Negative  --    INFZB Negative  --    FLUAH1  --  Negative   FLUAH3  --  Negative   [id number]  --  Negative     Hepatitis B Testing     Recent Labs   Lab Test 09/03/19  1320 09/03/19  1257   AUSAB 4.42  --    HBCAB  --  Nonreactive   HEPBANG  --  Nonreactive        Hepatitis C Antibody   Date Value Ref Range Status   11/27/2017 Nonreactive NR^Nonreactive Final     Comment:     Assay performance characteristics have not been established for newborns,   infants, and children         Imaging:  Results for orders placed or performed in visit on 08/06/19   XR Foot RT G/E 3 vw    Narrative    Exam: 3 views of the right foot dated 8/6/2019.    COMPARISON: 7/30/2019.    CLINICAL HISTORY: Osteomyelitis distal phalanx.    FINDINGS: AP, oblique, and lateral views of the right foot were  obtained. Erosive changes involving the distal aspect of the right  great toe distal phalanx again noted. Partially visualized saurav in the  distal fibula. The bones are osteopenic appearing.       Impression    IMPRESSION: Redemonstration of known erosive changes involving the  distal tuft of the right great toe distal phalanx, consistent with  known osteomyelitis.    YEISON OLEA MD       Infectious Disease Clinic Staff Note: Mr. Moreira was seen, examined, and the case was discussed with Dr. Bergeron, ID Fellow -- I agree with her consultative history and examination, assessment and plan in this outpatient ID Consult note. This note reflects my observations and opinions and the plan outlined fully  reflects my approach. On examination of his feett, a partially-calloused, partially open ulcer with slight oozing is seen on the distal plantar aspect of his right first toe, without present surrounding erythema or other inflammation. I have reviewed the available history, radiology, laboratory results, and reports with the Fellow.    Leopoldo Croft MD

## 2019-09-18 NOTE — TELEPHONE ENCOUNTER
Per face to face discussion with Dr. Croft, writer called [facility] and let them know patient's INR should be checked more frequently (once a week) due to patient being started on flagyl and levaquin today.     Irena Perez RN

## 2019-09-18 NOTE — NURSING NOTE
"Chief Complaint   Patient presents with     Consult     New patient consult     Blood pressure 124/78, pulse 76, temperature 98.7  F (37.1  C), temperature source Oral, height 1.829 m (6' 0.01\"), weight (!) 176.9 kg (390 lb 1.6 oz), SpO2 97 %.    Delmi Garcia on 9/18/2019 at 2:00 PM    "

## 2019-09-18 NOTE — TELEPHONE ENCOUNTER
"Received transfer call from  and spoke with infectious disease clinic staff, patient was seen by infectious disease team today and was prescribed Flagyl and Levaquin, and per infection disease clinic staff  recommendation \"to check INR\" weekly. notified that message will be routed to PCP and anticoagulation team.    Bladimir Campbell RN    "

## 2019-09-19 ENCOUNTER — OFFICE VISIT (OUTPATIENT)
Dept: WOUND CARE | Facility: CLINIC | Age: 69
End: 2019-09-19
Payer: COMMERCIAL

## 2019-09-19 DIAGNOSIS — I50.30 (HFPEF) HEART FAILURE WITH PRESERVED EJECTION FRACTION (H): Primary | ICD-10-CM

## 2019-09-19 DIAGNOSIS — L97.511 SKIN ULCER OF TOE OF RIGHT FOOT, LIMITED TO BREAKDOWN OF SKIN (H): Primary | ICD-10-CM

## 2019-09-19 NOTE — PROGRESS NOTES
Patient comes to wound clinic for wound assessment per request of dr. Lagos. He has history of a Diabetic foot ulcer  Clean gloves are donned and current dressing removed.       Previous treatment includes: dressing changed  1 dayago  First date of treatment:    Objective findings:  A ulcer wound is noted at right  distal hallux measuring 0.2 cm x 0.2cm x 0.2 cm  Wound base: Red/Granulation   Edges: hyperkeratotic  Drainage: slight  Odor: no  Undermining: no  Bone Exposure: No  Tenderness:not    Slough appearance:  none       Eschar appearance:  none       Periwound Skin: maceration and fibrotic,      Compared to last week       Subjective finding:     Pt states: doing well    Patient is assessed for discomfort which is: absent    Today's status of the wound: healing    Treatment: Removal of existing dressing, Visual inspection, fibrotic tissue removed around the wound Cleansing with Microklenz spray, Wound packing with iodofoam and optifoam Application of clean dressing         Pt received the following instructions:    Signs and symptoms of infection taught.  Patient needs to be seen 1 week. Treated and follow-up appointment made. Dr. Lagos was available for supervision of care if needed or if questions should arise and regarding plan of care.  Becca Mccullough RN, CWON

## 2019-09-20 ENCOUNTER — TELEPHONE (OUTPATIENT)
Dept: PHARMACY | Facility: PHYSICIAN GROUP | Age: 69
End: 2019-09-20

## 2019-09-20 NOTE — TELEPHONE ENCOUNTER
Telephone encounter    Sybil RN: [phone]    Clarke has been started on Metronidazole and continues to take warfarin.   Warfarin and Flagyl have a significant drug interaction.  ID recommends weekly INRs.    Current warfarin dose is 12.5 mg on Tuesdays and Thursdays and takes 10 mg all other days of the week.  I have called the patient today to explain this potential drug interaction.  He has not started metronidazole as of yet.  He awaits this medications arrival and expect to start that medication today    Plan today is to hold warfarin 10 mg on Friday as a precaution.  Resume warfarin dose on Saturday and INR to be done by home care nurse on Monday, September 23.  I have paged home care nurse Sybil to relay these orders.  Patient understands my instructions and is able to repeat instructions back to me.    Tj Palumbo, Pharm.D.

## 2019-09-20 NOTE — TELEPHONE ENCOUNTER
I have called home care nurse and patient.  INR to be done on Monday via home Care.    Tj Palumbo, Pharm.D.

## 2019-09-20 NOTE — PROGRESS NOTES
[facility]  Infectious Disease Clinic Note:  New Patient     Patient:  Clarke Moreira, Date of birth 1950, Medical record number [id number]  Date of Visit:  09/20/2019  Consult requested by Dr. Jesús Lagos for evaluation of diabetic foot ulcer and right great toe osteomyelitis.         Assessment and Recommendations:     Assessment:    70 y/o M with DM, Afib on Warfarin, referred by Podiatry for Diabetic foot ulcer with right great toe osteomyeltis.     Based on exam and review of images, it might be worth trying a course of antibiotics as recommended by Podiatry. Culture grew mostly expected skin michael. Will treat with Levaquin and Flagyl to cover Pseudomonas, anaerobes and other expected pathogens in a diabetic foot infection.     Recommendations:  - Start Levaquin 750 mg daily plus Flagyl 500 mg TID x 6 weeks  - [facility] informed to watch his INR closely while on antibiotics  - Advised good blood sugar control to aid healing  - Follow up on 10/30/2019    Leopoldo Croft  PGY5 Infectious Diseases Fellow  Pager: [phone]         History of the Infectious Disease lllness:     Patient says his ulcer started in March when he was admitted for sepsis 2/2 a dental infection. During the admission, he was found to have what sounds like a callus/growth on the plantar aspect of his right great toe. Podiatry was consulted in the hospital and he underwent a debridement, which created the ulcer, which never ended up healing. He was found to have osteomyelitis of the distal phalanx. He was advised by Orthopedics to have the toe amputated. Patient went to Dr Lagos from Podiatry for a second opinion. Dr Lagos was of the opinion that he should get a trial of antibiotics, and that the wound can be expected to close up. He received 2 ten day courses of Augmentin. Was referred to us for antibiotic recommendation. Podiatry also got a superficial wound culture. Patient reports  no pain 2/2 peripheral neuropathy. He dresses it daily.     Review of Systems:  CONSTITUTIONAL:  No fevers or chills. No night sweats.  EYES: negative for icterus or acute vision changes.   ENT:  negative for hearing loss, tinnitus or sore throat  RESPIRATORY:  negative for cough, sputum, dyspnea  CARDIOVASCULAR:  negative for chest pain, heart palpitations  GASTROINTESTINAL:  negative for nausea, vomiting, diarrhea or constipation  GENITOURINARY:  negative for dysuria or hematuria.  HEME:  No easy bruising or bleeding  INTEGUMENT:  As above  NEURO:  Negative for headache or tremor.    Past Medical History:   Diagnosis Date     Atrial fibrillation (H)      Basal cell carcinoma      Chronic anticoagulation      Diastolic heart failure (H)      Dyslipidemia      Essential hypertension      Morbid obesity (H)      Sleep-disordered breathing      Type 2 diabetes mellitus (H)      Past Surgical History:   Procedure Laterality Date     BIOPSY OF SKIN LESION       MOHS MICROGRAPHIC PROCEDURE       PICC INSERTION Right 09/24/2017    5fr TL Bard PICC, 51cm (1cm external) in the R medial brachial vein w/ tip in the SVC.     Family History   Problem Relation Age of Onset     Depression Mother      Glaucoma Maternal Grandfather      Cancer No family hx of         No family history of skin cancer     Macular Degeneration No family hx of      Social History     Social History Narrative    Lives in an apartment in 16th floor near hospital.  Has elevators, unable to use stairs. Not able to shop; has things delivered to him including food. Difficulty completing cleaning. Goes to PCP once yearly for annual check up and medication review.     Social History     Tobacco Use     Smoking status: Never Smoker     Smokeless tobacco: Never Used   Substance Use Topics     Alcohol use: No     Comment: Former alcohol abuse. Quit 70s then quit later in 80s-present     Drug use: No     Comment: history of LSD use     Immunization History    Administered Date(s) Administered     Influenza (IIV3) PF 12/15/2005, 03/08/2007, 01/25/2008, 10/01/2010, 12/02/2011, 09/06/2017     Mantoux Tuberculin Skin Test 10/09/2017, 03/23/2019     Pneumo Conj 13-V (2010&after) 11/27/2017     Pneumococcal 23 valent 03/24/2019     TDAP Vaccine (Boostrix) 11/27/2017     Tdap (Adacel,Boostrix) 03/08/2007     Patient Active Problem List   Diagnosis     Atrial fibrillation (H)     Hypothyroidism     Basal cell carcinoma of skin of face     CTS (carpal tunnel syndrome)     Myopia     Plantar fasciitis     Presbyopia     Regular astigmatism     Neoplasm of uncertain behavior of skin     Venous stasis     Type 2 diabetes mellitus with hyperglycemia, without long-term current use of insulin (H)     Morbid obesity (H)     Depression with anxiety     Hyperlipidemia LDL goal <100     Fall     BiPAP (biphasic positive airway pressure) dependence     Lymphedema     Chronic systolic congestive heart failure (H)     Essential hypertension with goal blood pressure less than 140/90     Urinary retention     Constipation, unspecified constipation type     Cellulitis     Onychomycosis     (HFpEF) heart failure with preserved ejection fraction (H)     Chronic hypercapnic respiratory failure (H)     Hypoventilation associated with obesity syndrome (H)     Pre-syncope     Elevated serum creatinine     PAD (peripheral artery disease) (H)     THONG (obstructive sleep apnea)     Hypotension     Adequate nutrition     Osteomyelitis of right foot, unspecified type (H)     Advanced directives, counseling/discussion     Outpatient Medications Marked as Taking for the 9/18/19 encounter (Office Visit) with Leopoldo Croft MD   Medication Sig     ammonium lactate (LAC-HYDRIN) 12 % external cream Apply topically 2 times daily as needed for dry skin     amoxicillin-clavulanate (AUGMENTIN) 875-125 MG tablet Take 1 tablet by mouth 2 times daily     Ascorbic Acid (VITAMIN C) POWD Take 500 mcg by mouth daily      aspirin (ASA) 81 MG tablet Take 1 tablet (81 mg) by mouth daily     atorvastatin (LIPITOR) 40 MG tablet Take 1 tablet (40 mg) by mouth daily     bacitracin 500 UNIT/GM external ointment Apply 30 g topically 2 times daily     blood glucose (ONETOUCH ULTRA) test strip Use to test blood sugars 2 times daily or as directed.     blood glucose monitoring (ONE TOUCH DELICA) lancets Use to test blood sugars 2 times daily or as directed.     blood glucose monitoring (ONE TOUCH ULTRA MINI) meter device kit Use to test blood sugars 2 times daily or as directed.     levofloxacin (LEVAQUIN) 750 MG tablet Take 1 tablet (750 mg) by mouth daily     levothyroxine (SYNTHROID/LEVOTHROID) 175 MCG tablet Take 1 tablet (175 mcg) by mouth every morning (before breakfast)     metFORMIN (GLUCOPHAGE) 500 MG tablet Take 1 tablet (500 mg) by mouth 2 times daily (with meals)     metoprolol succinate ER (TOPROL-XL) 100 MG 24 hr tablet Take 1 tablet (100 mg) by mouth daily     metroNIDAZOLE (FLAGYL) 500 MG tablet Take 1 tablet (500 mg) by mouth 3 times daily     multivitamin w/minerals (MULTI-VITAMIN) tablet Take 1 tablet by mouth daily     naproxen (NAPROSYN) 500 MG tablet Take 1 tablet (500 mg) by mouth 2 times daily (with meals)     nystatin (MYCOSTATIN) 904626 UNIT/GM external cream Apply topically 2 times daily as needed for dry skin     omega 3 1000 MG CAPS Take 1 g by mouth daily     order for DME Equipment being ordered: Diabetes Shoes     order for DME Equipment being ordered: Custom diabetic shoes     order for DME Equipment being ordered: Bariatric Lift chair  Diagnosis - morbid obesity, CHF, Lymphedema    Fax to Ne from [employer] at [phone].     order for DME Equipment being ordered: Other: Velcro Compression stockings.   Treatment Diagnosis: bilateral lymphedema.     polyethylene glycol (MIRALAX/GLYCOLAX) packet Take 17 g by mouth daily as needed for constipation     potassium chloride ER (K-DUR/KLOR-CON M) 20 MEQ CR  "tablet Take 2 tablets (40 mEq) by mouth 2 times daily     senna-docusate (SENOKOT-S/PERICOLACE) 8.6-50 MG tablet Take 1-2 tablets by mouth 2 times daily as needed for constipation     spironolactone (ALDACTONE) 25 MG tablet Take 2 tablets (50 mg) by mouth daily     tamsulosin (FLOMAX) 0.4 MG capsule Take 1 capsule (0.4 mg) by mouth daily     torsemide (DEMADEX) 100 MG tablet Take 1 tablet (100 mg) by mouth daily Take one tab (100mg) by mouth every morning, take one half tab (50mg) by mouth every evening     traMADol (ULTRAM) 50 MG tablet Take 1 tablet (50 mg) by mouth every 6 hours as needed for severe pain     vitamin B complex with vitamin C (VITAMIN  B COMPLEX) tablet Take 1 tablet by mouth daily     vitamin E (VITAMIN E COMPLEX) 1000 units (450 mg) capsule Take 1 capsule (1,000 Units) by mouth daily     warfarin (COUMADIN) 5 MG tablet Take 12.5 mg by mouth  1x/week, Take 10 mg by mouth 6x per week     Current Facility-Administered Medications for the 9/18/19 encounter (Office Visit) with Leopoldo Croft MD   Medication     lidocaine 1% with EPINEPHrine 1:100,000 injection 3 mL     No Known Allergies         Physical Exam:     /78 (BP Location: Right arm, Patient Position: Sitting, Cuff Size: Adult Large)   Pulse 76   Temp 98.7  F (37.1  C) (Oral)   Ht 1.829 m (6' 0.01\")   Wt (!) 176.9 kg (390 lb 1.6 oz)   SpO2 97%   BMI 52.90 kg/m    Wt Readings from Last 4 Encounters:   09/18/19 (!) 176.9 kg (390 lb 1.6 oz)   09/03/19 (!) 179.2 kg (395 lb)   08/27/19 (!) 178.8 kg (394 lb 3.2 oz)   06/13/19 (!) 173.9 kg (383 lb 6.4 oz)     Exam:  GENERAL: well-developed, well-nourished, alert, oriented, in no acute distress.  HEAD: Head is normocephalic, atraumatic   EYES: Eyes have anicteric sclerae.    ENT: Oropharynx is moist without exudates or ulcers.  NECK: Supple.  LUNGS: Clear to auscultation.  CARDIOVASCULAR: Regular rate and rhythm with no murmurs, gallops or rubs.  ABDOMEN: Normal bowel sounds, soft, " nontender.  SKIN: No acute rashes.  NEUROLOGIC: Grossly nonfocal.         Laboratory Data:     Inflammatory Markers    Recent Labs   Lab Test 03/06/19  1728 03/06/19  1110 01/06/18  0728 01/05/18  1109   SED 19  --   --   --    CRP  --  5.9 8.9* 6.2     Metabolic Studies    Recent Labs   Lab Test 08/27/19  0826 05/20/19  1153 05/10/19  0807  03/23/19  0624  03/13/19  1701  06/04/18  1853  01/15/18  1021  09/23/17  1049    137 137.0   < > 140   < >  --    < > 142   < > 132.4*   < > 125*   POTASSIUM 3.8 4.0 4.2   < > 3.1*   < >  --    < > 4.9   < > 4.3   < > 3.8   CHLORIDE 102 101 101.2   < > 100   < >  --    < > 104   < > 95.0*   < > 87*   CO2 28 30 26.1   < > 30   < >  --    < > 30   < > 24.7   < > 29   ANIONGAP 6 7  --    < > 10   < >  --    < > 7   < >  --    < > 10   BUN 16 13 18.7   < > 22   < >  --    < > 14   < > 20.2   < > 10   CR 0.87 1.18 1.1   < > 0.99   < >  --    < > 0.94   < > 0.9   < > 0.77   GFRESTIMATED 88 63 70.8   < > 78   < >  --    < > 80   < > >90   < > >90   * 125* 133.1*   < > 112*   < >  --    < > 91   < > 115.1*   < > 120*   CHERI 8.9 10.0 9.1   < > 8.7   < >  --    < > 9.2   < > 10.0   < > 8.2*   PHOS  --   --   --   --   --   --   --   --   --   --  3.3   < >  --    MAG  --   --   --   --  2.2   < >  --    < > 2.0  --  2.4*   < > 1.5*   URIC  --   --   --   --   --   --   --   --  8.0*  --   --   --   --    LACT  --   --   --   --   --   --  1.5   < >  --   --   --    < >  --    CKT  --   --   --   --   --   --   --   --   --   --   --   --  84    < > = values in this interval not displayed.     Hepatic Studies    Recent Labs   Lab Test 03/06/19  1110 12/11/18  1125 08/28/18  1358 06/04/18  1853 01/05/18  1109   BILITOTAL 0.5 0.9 0.7 1.1 1.1   ALKPHOS 105 88.2 81.8 79 87   PROTTOTAL 7.5 8.0 7.6 6.8 7.6   ALBUMIN 3.5  --   --  3.3* 3.1*   AST 14 20.7 18.0 16 16   ALT 18 19.8 18.0 22 16     Pancreatitis testing    Recent Labs   Lab Test 08/28/18  1358 08/14/17  1306   TRIG 107.0  71.5     Lipid testing    Recent Labs   Lab Test 08/28/18  1358 08/14/17  1306 09/14/16  0939   CHOL 120.3 94.3 139.5   HDL 44.1 33.7* 36.6*   LDL 55 46 85   TRIG 107.0 71.5 87.1   CHOLHDLRATIO 2.7 2.8 3.8     Gout Labs      Recent Labs   Lab Test 06/04/18  1853   URIC 8.0*     Hematology Studies     Recent Labs   Lab Test 04/17/19  0914 03/16/19  0629 03/15/19  0537 03/14/19  0617 03/13/19  0613  03/06/19  1110  06/04/18  1853   WBC  --  7.7 9.1 7.6 8.9   < > 11.9*  --  9.3   ANEU  --   --   --   --   --   --  8.1  --  5.8   ALYM 1.9  --   --   --   --   --  2.5   < > 2.5   BARB  --   --   --   --   --   --  1.1  --  0.8   AEOS  --   --   --   --   --   --  0.0  --  0.2   HGB 13.8 14.7 13.9 13.9 13.7   < > 14.8   < > 13.5   HCT 46.0 45.9 43.6 43.6 43.1   < > 45.1   < > 40.8   PLT  --  238 242 239 247   < > 284  --  195    < > = values in this interval not displayed.     Clotting Studies    Recent Labs   Lab Test 08/27/19  0826 07/16/19  1158 07/02/19  1341 05/21/19  1112   INR 2.55* 1.91* 2.6 1.7     Iron Testing    Recent Labs   Lab Test 04/17/19  0914  12/26/17  1012   IRON  --   --  60   FEB  --   --  359   IRONSAT  --   --  17   MCV 96.9   < > 95    < > = values in this interval not displayed.     Arterial Blood Gas Testing    Recent Labs   Lab Test 09/30/17  0715 09/29/17  0629 09/29/17  0257 09/27/17  2356  09/24/17  0709 09/24/17  0530 09/24/17  0502   PH  --  7.41  --   --   --  7.29* 7.14* Canceled, Test credited   PCO2  --  70*  --   --   --  66* 112* Canceled, Test credited   PO2  --  67*  --   --   --  122* 58* Canceled, Test credited   HCO3  --  44*  --   --   --  32* 38* Canceled, Test credited   O2PER 30.0 30.0 60 30   < > 80.0 60.0 60.0  Canceled, Test credited    < > = values in this interval not displayed.      Thyroid Studies     Recent Labs   Lab Test 03/12/19  0625 12/11/18  1125 03/27/18  1320 01/06/18  0728 11/10/17  1608   TSH 1.38 11.20* 3.68 6.80* 4.89*   T4  --  1.21  --   --  1.60*      Urine Studies     Recent Labs   Lab Test 03/06/19  1726 11/10/17  1059 10/08/17  1059 09/23/17  1900   URINEPH 5.0 6.0 7.0 6.0   NITRITE Negative Negative Negative Negative   LEUKEST Negative  --  Negative Negative   WBCU 0  --  <1 <1     Medication levels    Recent Labs   Lab Test 03/08/19  1024   VANCOMYCIN 25.9*     Microbiology:  Last Culture results with specimen source  Culture Micro   Date Value Ref Range Status   08/06/2019 Light growth  Staphylococcus pseudintermedius   (A)  Final   08/06/2019 (A)  Final    Light growth  Coagulase negative Staphylococcus  Susceptibility testing not routinely done     08/06/2019 (A)  Final    Moderate growth  Corynebacterium striatum  Identification obtained by MALDI-TOF mass spectrometry research use only database. Test   characteristics determined and verified by the [facility]   ([facility]) [place], [place].  Susceptibility testing not routinely done     03/06/2019 No growth  Final   03/06/2019 No growth  Final   01/05/2018 No growth  Final   01/05/2018 No growth  Final   09/23/2017 No growth  Final   09/23/2017 No growth  Final   02/26/2008 No growth  Final   02/26/2008 No growth  Final   02/26/2008 No growth  Final   01/25/2008 Light growth Aspergillus niger  Final    Specimen Description   Date Value Ref Range Status   08/06/2019 Right Foot Toe Hallux Uler  Final   08/06/2019 Right Foot Toe Hallux uler  Final   03/07/2019 Nares  Final   03/06/2019 Blood Right Arm  Final   03/06/2019 Nasopharyngeal  Final   03/06/2019 Blood Unspecified Site  Final   01/05/2018 Blood Left Hand  Final   01/05/2018 Blood Right Hand  Final   09/29/2017 Nares  Final   09/23/2017 Blood Right Hand  Final   09/23/2017 Blood Left Hand  Final   02/26/2008 Blood Right Hand  Final   02/26/2008 Blood Right Hand  Final   02/26/2008 Blood Left Arm  Final   01/25/2008 Wound  Final   01/21/2007 Feces  Final        Virology:    Respiratory virus testing    Recent Labs   Lab  Test 03/06/19  1117 09/24/17  0408   FLUAH3  --  Negative   HMPV  --  Negative   PIV3  --  Negative   HRVS  --  Negative   RVSPEC  --  Nasopharyngeal   RSVA  --  Negative   RSVB  --  Negative   IRSV Negative  --      Recent Labs   Lab Test 03/06/19  1117 09/24/17  0408   RVSPEC  --  Nasopharyngeal   IFLUA  --  Negative   IFLUB  --  Negative   INFZA Negative  --    INFZB Negative  --    FLUAH1  --  Negative   FLUAH3  --  Negative   GH0308  --  Negative     Hepatitis B Testing     Recent Labs   Lab Test 09/03/19  1320 09/03/19  1257   AUSAB 4.42  --    HBCAB  --  Nonreactive   HEPBANG  --  Nonreactive        Hepatitis C Antibody   Date Value Ref Range Status   11/27/2017 Nonreactive NR^Nonreactive Final     Comment:     Assay performance characteristics have not been established for newborns,   infants, and children         Imaging:  Results for orders placed or performed in visit on 08/06/19   XR Foot RT G/E 3 vw    Narrative    Exam: 3 views of the right foot dated 8/6/2019.    COMPARISON: 7/30/2019.    CLINICAL HISTORY: Osteomyelitis distal phalanx.    FINDINGS: AP, oblique, and lateral views of the right foot were  obtained. Erosive changes involving the distal aspect of the right  great toe distal phalanx again noted. Partially visualized saurav in the  distal fibula. The bones are osteopenic appearing.       Impression    IMPRESSION: Redemonstration of known erosive changes involving the  distal tuft of the right great toe distal phalanx, consistent with  known osteomyelitis.    YEISON OLEA MD

## 2019-09-23 ENCOUNTER — TELEPHONE (OUTPATIENT)
Dept: INFECTIOUS DISEASES | Facility: CLINIC | Age: 69
End: 2019-09-23

## 2019-09-23 ENCOUNTER — TELEPHONE (OUTPATIENT)
Dept: FAMILY MEDICINE | Facility: CLINIC | Age: 69
End: 2019-09-23

## 2019-09-23 LAB — INR PPP: 1.9

## 2019-09-23 NOTE — TELEPHONE ENCOUNTER
Leopoldo Croft MD  You 32 minutes ago (2:51 PM)      Luis Devine,     Yes, he has to take 3 a day. He can start doing that from today.   He need not worry about the missed doses. We will adjust his start date accordingly.     Thank you,   Leopoldo

## 2019-09-23 NOTE — TELEPHONE ENCOUNTER
Called pt and relayed response from Dr. Medina. Pt reports that today will be his 1st day taking TID.   Sybil Flynn RN

## 2019-09-23 NOTE — TELEPHONE ENCOUNTER
Telephone encounter:    I have called to confirm the the INR.  Home care nurse Lorena reports INR to be 1.9.  She is double check this.    Nurse reports that the patient held his warfarin dose on Friday.  No new problems or concerns today.  Patient continues to take antibiotics interact with warfarin.  Plan today is to continue on warfarin 10 mg daily.  This is a decrease from the previous maintenance dose.  Plan to follow-up INR on a weekly basis for the next month.    Home care nurse repeats the orders and will convey these to the patient today.    Tj Palumbo, Pharm.D.

## 2019-09-23 NOTE — TELEPHONE ENCOUNTER
INR Result: 1.2    Name of person that should receive call back: Lorena    Relationship to patient: [facility] RN     Okay to leave a voicemail: Yes    Is an  needed: No

## 2019-09-24 NOTE — PROGRESS NOTES
Infectious Disease Clinic Staff Note: Mr. Moreira was seen, examined, and the case was discussed with Dr. Bergeron, ID Fellow -- I agree with her consultative history and examination, assessment and plan in this outpatient ID Consult note. This note reflects my observations and opinions and the plan outlined fully reflects my approach. On examination of his feett, a partially-calloused, partially open ulcer with slight oozing is seen on the distal plantar aspect of his right first toe, without present surrounding erythema or other inflammation. The foot is warm and there seems to be adequate capillary refill of the toes.  I have reviewed the available history, radiology, laboratory results, and reports with the Fellow.

## 2019-09-26 ENCOUNTER — OFFICE VISIT (OUTPATIENT)
Dept: CARDIOLOGY | Facility: CLINIC | Age: 69
End: 2019-09-26
Attending: INTERNAL MEDICINE
Payer: COMMERCIAL

## 2019-09-26 VITALS
HEART RATE: 88 BPM | WEIGHT: 315 LBS | OXYGEN SATURATION: 97 % | DIASTOLIC BLOOD PRESSURE: 83 MMHG | BODY MASS INDEX: 42.66 KG/M2 | SYSTOLIC BLOOD PRESSURE: 121 MMHG | HEIGHT: 72 IN

## 2019-09-26 DIAGNOSIS — I50.32 CHRONIC HEART FAILURE WITH PRESERVED EJECTION FRACTION (H): Primary | ICD-10-CM

## 2019-09-26 DIAGNOSIS — I50.30 HEART FAILURE WITH PRESERVED EJECTION FRACTION, UNSPECIFIED HF CHRONICITY (H): ICD-10-CM

## 2019-09-26 DIAGNOSIS — E66.01 MORBID OBESITY (H): ICD-10-CM

## 2019-09-26 DIAGNOSIS — I48.20 CHRONIC ATRIAL FIBRILLATION (H): ICD-10-CM

## 2019-09-26 DIAGNOSIS — I50.30 (HFPEF) HEART FAILURE WITH PRESERVED EJECTION FRACTION (H): ICD-10-CM

## 2019-09-26 DIAGNOSIS — Z99.89 BIPAP (BIPHASIC POSITIVE AIRWAY PRESSURE) DEPENDENCE: ICD-10-CM

## 2019-09-26 DIAGNOSIS — G47.33 OSA (OBSTRUCTIVE SLEEP APNEA): ICD-10-CM

## 2019-09-26 LAB
ANION GAP SERPL CALCULATED.3IONS-SCNC: 8 MMOL/L (ref 3–14)
BUN SERPL-MCNC: 23 MG/DL (ref 7–30)
CALCIUM SERPL-MCNC: 9.4 MG/DL (ref 8.5–10.1)
CHLORIDE SERPL-SCNC: 98 MMOL/L (ref 94–109)
CO2 SERPL-SCNC: 27 MMOL/L (ref 20–32)
CREAT SERPL-MCNC: 1.2 MG/DL (ref 0.66–1.25)
GFR SERPL CREATININE-BSD FRML MDRD: 61 ML/MIN/{1.73_M2}
GLUCOSE SERPL-MCNC: 94 MG/DL (ref 70–99)
POTASSIUM SERPL-SCNC: 3.9 MMOL/L (ref 3.4–5.3)
SODIUM SERPL-SCNC: 133 MMOL/L (ref 133–144)

## 2019-09-26 PROCEDURE — G0463 HOSPITAL OUTPT CLINIC VISIT: HCPCS | Mod: ZF

## 2019-09-26 PROCEDURE — 36415 COLL VENOUS BLD VENIPUNCTURE: CPT | Performed by: NURSE PRACTITIONER

## 2019-09-26 PROCEDURE — 80048 BASIC METABOLIC PNL TOTAL CA: CPT | Performed by: NURSE PRACTITIONER

## 2019-09-26 PROCEDURE — 99214 OFFICE O/P EST MOD 30 MIN: CPT | Mod: ZP | Performed by: NURSE PRACTITIONER

## 2019-09-26 ASSESSMENT — MIFFLIN-ST. JEOR: SCORE: 2553.88

## 2019-09-26 ASSESSMENT — PAIN SCALES - GENERAL: PAINLEVEL: NO PAIN (0)

## 2019-09-26 NOTE — PROGRESS NOTES
"  HPI:   Mr. Moreira is a 69 year old male with a past medical history of systolic heart failure, most recent LVEF normal, also hx of morbid obesity, atrial fibrillation s/p ablation 2008, THONG/alveolar hypoventilation, HTN, dyslipidemia, PAD, hypothyroidism, type2 DM, depression, anxiety, BPH and a recent hospitalization for Sepsis (3/7/19-4/3/19) with sinus infection. Osteomyelitis diagnosed in March. Orthopedics recommended surgery , his podiatrist suggested to try antibiotics.   Presents to clinic for [facility] follow-up.     Recent dental extraction (2/26/19) who was admitted to [facility] 3/6 for sepsis and MICHELLE. Subsequently diagnosed with maxillary sinusitis (completed abx 3/12). MICHELLE resolved with IVF. Hospital stay c/b a-fib with RVR. Transferred to TCU for rehab.     Today he reports feeling well. He was on 6 weeks of antibiotics for osteomyelitis R big toe.  He is very happy with his current medication regimen which his PCP has helped with. He is not on any Lisinopril. He is taking 100 mg of Torsemide daily and 100 mg of Toprol. Apparently he had been discharged home ( previous admission) on 100 mg Torsemide in am and 50 mg in the pm. He says his PCP discontinued the pm dose as the \"there was no benefit\".   Denies orthopnea, PND, cough, he wears CPAP. Functionally very limited due to body size but denies chest pain or shortness of breath with current level of exertion. He denies palpitations, presyncope, syncope, orthostasis. Weight at home today 382 lb. He hasn't been checking weights daily. Patient reports having trouble sleeping due to anxiety and being alone.  Appetite is fine. He felt maybe recent antibiotics caused some GI upset.          PAST MEDICAL HISTORY:  Past Medical History:   Diagnosis Date     Atrial fibrillation (H)      Basal cell carcinoma      Chronic anticoagulation      Diastolic heart failure (H)      Dyslipidemia      Essential hypertension      Morbid obesity (H)      Sleep-disordered " breathing      Type 2 diabetes mellitus (H)        FAMILY HISTORY:  Family History   Problem Relation Age of Onset     Depression Mother      Glaucoma Maternal Grandfather      Cancer No family hx of         No family history of skin cancer     Macular Degeneration No family hx of        SOCIAL HISTORY:  Social History     Socioeconomic History     Marital status: Single     Spouse name: Not on file     Number of children: Not on file     Years of education: Not on file     Highest education level: Not on file   Occupational History     Not on file   Social Needs     Financial resource strain: Not on file     Food insecurity:     Worry: Not on file     Inability: Not on file     Transportation needs:     Medical: Not on file     Non-medical: Not on file   Tobacco Use     Smoking status: Never Smoker     Smokeless tobacco: Never Used   Substance and Sexual Activity     Alcohol use: No     Comment: Former alcohol abuse. Quit 70s then quit later in 80s-present     Drug use: No     Comment: history of LSD use     Sexual activity: Not on file   Lifestyle     Physical activity:     Days per week: Not on file     Minutes per session: Not on file     Stress: Not on file   Relationships     Social connections:     Talks on phone: Not on file     Gets together: Not on file     Attends Taoist service: Not on file     Active member of club or organization: Not on file     Attends meetings of clubs or organizations: Not on file     Relationship status: Not on file     Intimate partner violence:     Fear of current or ex partner: Not on file     Emotionally abused: Not on file     Physically abused: Not on file     Forced sexual activity: Not on file   Other Topics Concern     Parent/sibling w/ CABG, MI or angioplasty before 65F 55M? Not Asked   Social History Narrative    Lives in an apartment in 16th floor near hospital.  Has elevators, unable to use stairs. Not able to shop; has things delivered to him including food.  Difficulty completing cleaning. Goes to PCP once yearly for annual check up and medication review.       CURRENT MEDICATIONS:  ammonium lactate (LAC-HYDRIN) 12 % external cream, Apply topically 2 times daily as needed for dry skin  Ascorbic Acid (VITAMIN C) POWD, Take 500 mcg by mouth daily  aspirin (ASA) 81 MG tablet, Take 1 tablet (81 mg) by mouth daily  atorvastatin (LIPITOR) 40 MG tablet, Take 1 tablet (40 mg) by mouth daily  bacitracin 500 UNIT/GM external ointment, Apply 30 g topically 2 times daily  blood glucose (ONETOUCH ULTRA) test strip, Use to test blood sugars 2 times daily or as directed.  blood glucose monitoring (ONE TOUCH DELICA) lancets, Use to test blood sugars 2 times daily or as directed.  blood glucose monitoring (ONE TOUCH ULTRA MINI) meter device kit, Use to test blood sugars 2 times daily or as directed.  levofloxacin (LEVAQUIN) 750 MG tablet, Take 1 tablet (750 mg) by mouth daily  levothyroxine (SYNTHROID/LEVOTHROID) 175 MCG tablet, Take 1 tablet (175 mcg) by mouth every morning (before breakfast)  metFORMIN (GLUCOPHAGE) 500 MG tablet, Take 1 tablet (500 mg) by mouth 2 times daily (with meals)  metoprolol succinate ER (TOPROL-XL) 100 MG 24 hr tablet, Take 1 tablet (100 mg) by mouth daily  metroNIDAZOLE (FLAGYL) 500 MG tablet, Take 1 tablet (500 mg) by mouth 3 times daily  multivitamin w/minerals (MULTI-VITAMIN) tablet, Take 1 tablet by mouth daily  nystatin (MYCOSTATIN) 982693 UNIT/GM external cream, Apply topically 2 times daily as needed for dry skin  omega 3 1000 MG CAPS, Take 1 g by mouth daily  order for DME, Equipment being ordered: Bariatric Lift chair  Diagnosis - morbid obesity, CHF, Lymphedema    Fax to Ne from [employer] at [phone].  order for DME, Equipment being ordered: Other: Velcro Compression stockings.   Treatment Diagnosis: bilateral lymphedema.  polyethylene glycol (MIRALAX/GLYCOLAX) packet, Take 17 g by mouth daily as needed for constipation  potassium chloride  ER (K-DUR/KLOR-CON M) 20 MEQ CR tablet, Take 2 tablets (40 mEq) by mouth 2 times daily  senna-docusate (SENOKOT-S/PERICOLACE) 8.6-50 MG tablet, Take 1-2 tablets by mouth 2 times daily as needed for constipation  spironolactone (ALDACTONE) 25 MG tablet, Take 2 tablets (50 mg) by mouth daily  tamsulosin (FLOMAX) 0.4 MG capsule, Take 1 capsule (0.4 mg) by mouth daily  torsemide (DEMADEX) 100 MG tablet, Take 1 tablet (100 mg) by mouth daily Take one tab (100mg) by mouth every morning, take one half tab (50mg) by mouth every evening  vitamin B complex with vitamin C (VITAMIN  B COMPLEX) tablet, Take 1 tablet by mouth daily  vitamin E (VITAMIN E COMPLEX) 1000 units (450 mg) capsule, Take 1 capsule (1,000 Units) by mouth daily  warfarin (COUMADIN) 5 MG tablet, Take 12.5 mg by mouth  1x/week, Take 10 mg by mouth 6x per week  amoxicillin-clavulanate (AUGMENTIN) 875-125 MG tablet, Take 1 tablet by mouth 2 times daily (Patient not taking: Reported on 9/26/2019)  naproxen (NAPROSYN) 500 MG tablet, Take 1 tablet (500 mg) by mouth 2 times daily (with meals) (Patient not taking: Reported on 9/26/2019)  order for DME, Equipment being ordered: Diabetes Shoes (Patient not taking: Reported on 9/26/2019)  order for DME, Equipment being ordered: Custom diabetic shoes (Patient not taking: Reported on 9/26/2019)  traMADol (ULTRAM) 50 MG tablet, Take 1 tablet (50 mg) by mouth every 6 hours as needed for severe pain (Patient not taking: Reported on 9/26/2019)    lidocaine 1% with EPINEPHrine 1:100,000 injection 3 mL        ROS:   CONSTITUTIONAL: Denies fever, chills, fatigue.   HEENT: Denies headache, vision changes, and changes in speech.   CV: Refer to HPI.   PULMONARY:Refer to HPI.   GI:Denies nausea, vomiting, diarrhea, and abdominal pain. Bowel movements are regular.   :Denies urinary alterations, dysuria, urinary frequency, hematuria, and abnormal drainage.   EXT:+chronic lower extremity edema.   SKIN:Denies abnormal rashes or  lesions.   MUSCULOSKELETAL:Denies upper or lower extremity weakness and pain.   NEUROLOGIC:Denies lightheadedness, dizziness, seizures, or upper or lower extremity paresthesia.     EXAM:  /83 (BP Location: Left arm, Patient Position: Chair, Cuff Size: Adult Large)   Pulse 88   Ht 1.829 m (6')   Wt (!) 175.1 kg (386 lb)   SpO2 97%   BMI 52.35 kg/m     GENERAL: Appears comfortable, in no acute distress.uses a walker   HEENT: Eye symmetrical, no discharge or icterus bilaterally. Mucous membranes moist and without lesions.  CV:  irregular, distant S1S2, no appreciable murmur, rub, or gallop. JVP not detected (examined in chair).   RESPIRATORY: Respirations regular, even, and unlabored. Lungs CTA throughout.   GI: Soft, obese, and non distended with normoactive bowel sounds present in all quadrants. No tenderness, rebound, guarding. No hepatomegaly.   EXTREMITIES: 1+bilat peripheral edema. 2+ bilateral pedal pulses.   NEUROLOGIC: Alert and oriented x 3. No focal deficits.   MUSCULOSKELETAL: No joint swelling or tenderness.   SKIN: No jaundice. No rashes.     Labs, reviewed with patient in clinic today:  CBC RESULTS:  Lab Results   Component Value Date    WBC 7.7 03/16/2019    RBC 4.76 03/16/2019    HGB 13.8 04/17/2019    HCT 46.0 04/17/2019    MCV 96.9 04/17/2019    MCH 29.1 04/17/2019    MCHC 30.0 (L) 04/17/2019    RDW 12.8 03/16/2019     03/16/2019       CMP RESULTS:  Lab Results   Component Value Date     09/26/2019    POTASSIUM 3.9 09/26/2019    CHLORIDE 98 09/26/2019    CO2 27 09/26/2019    ANIONGAP 8 09/26/2019    GLC 94 09/26/2019    BUN 23 09/26/2019    CR 1.20 09/26/2019    GFRESTIMATED 61 09/26/2019    GFRESTBLACK 71 09/26/2019    CHERI 9.4 09/26/2019    BILITOTAL 0.5 03/06/2019    ALBUMIN 3.5 03/06/2019    ALKPHOS 105 03/06/2019    ALT 18 03/06/2019    AST 14 03/06/2019        INR RESULTS:  Lab Results   Component Value Date    INR 1.9 09/23/2019       Lab Results   Component Value Date     MAG 2.2 03/23/2019     Lab Results   Component Value Date    NTBNPI 2,329 (H) 03/06/2019     Lab Results   Component Value Date    NTBNP 655 (H) 08/27/2019       Diagnostics:  ECG 1/6/18  Atrial fibrillation with PVCs, multifocal    TTE 10/30/18  Global and regional left ventricular function is normal with an EF of 55-60%.  No regional wall motion abnormalities are seen.  Mild to moderate right ventricular dilation is present. Global right  ventricular function is mildly to moderately reduced.  No significant valve abnormalities.  Dilation of the inferior vena cava is present with normal respiratory  variation in diameter. Trivial pericardial effusion is present with no  hemodynamic significance.     This study was compared with the study from 05/18/2018. RV apopears somewhat  more dilated but otherwise no significant change.    [facility] 6/6/18        Assessment and Plan:   Mr. Moreira is a 69 year old male with prior history of systolic heart failure, most recent LVEF normal, also hx of morbid obesity, atrial fibrillation, THONG/alveolar hypoventilation, HTN. The patient is extremely happy with how he is feeling.  We had a long discussion about resuming a small dose of Lisinopril but the patient is not in favor of doing that or any blood pressure medication at this point. He is willing to monitor his weights daily and his BP's and be more reliable with reporting any issues. He is slightly hypervolemic today and we will have him do an extra Torsemide (50 mg ) in the afternoon as a part of his daily medications.    # Chronic borderline diastolic heart failure/HFpEF with improved EF  Stage C. NYHA Class III.  Fluid status: hypervolemic slightly today - Torsemide 100 mg in am and 50 mg in pm  ACEi/ARB/ARNI: Will need to reassess at visit in 3 months (see above)  BB: Toprol 100 mg daily  Aldosterone antagonist: spironolactone 25 mg bid (despite normal EF now, some benefit shown in TOPCAT trial)  SCD prophylaxis: n/a  EF>40%  Ischemic evaluation: negative coronary angiogram 6/2018  Remote monitoring: uses [employer]     # Afib  -continue the Toprol 100 mg daily      # HTN  -controlled on HF therapies    # Atrial fibrillation ?permanant, hx of ablation per patient   [id number]   -he is on warfarin and Toprol      # THONG/alveolar hypoventilation  -encouraged consistent CPAP use     Plan and Follow up  BMP in one week  Follow up with Dr. Parsons in November with las prior      40 minutes spent face-to-face with patient, >50% in counseling and/or coordination of care as described above        Mary Carmen KIRK, CNP    [facility]               CC  GEOFFREY PARSONS

## 2019-09-26 NOTE — LETTER
"9/26/2019       RE: Clarke Moreira  [address]  [facility] [address]     Dear Colleague,    Thank you for referring your patient, Clarke Moreira, to the [facility] at [facility]. Please see a copy of my visit note below.    HPI:   Mr. Moreira is a 69 year old male with a past medical history of systolic heart failure, most recent LVEF normal, also hx of morbid obesity, atrial fibrillation s/p ablation 2008, THONG/alveolar hypoventilation, HTN, dyslipidemia, PAD, hypothyroidism, type2 DM, depression, anxiety, BPH and a recent hospitalization for Sepsis (3/7/19-4/3/19) with sinus infection. Osteomyelitis diagnosed in March. Orthopedics recommended surgery , his podiatrist suggested to try antibiotics.   Presents to clinic for CORE follow-up.     Recent dental extraction (2/26/19) who was admitted to [facility] 3/6 for sepsis and MICHELLE. Subsequently diagnosed with maxillary sinusitis (completed abx 3/12). MICHELLE resolved with IVF. Hospital stay c/b a-fib with RVR. Transferred to TCU for rehab.     Today he reports feeling well. He was on 6 weeks of antibiotics for osteomyelitis R big toe.  He is very happy with his current medication regimen which his PCP has helped with. He is not on any Lisinopril. He is taking 100 mg of Torsemide daily and 100 mg of Toprol. Apparently he had been discharged home ( previous admission) on 100 mg Torsemide in am and 50 mg in the pm. He says his PCP discontinued the pm dose as the \"there was no benefit\".   Denies orthopnea, PND, cough, he wears CPAP. Functionally very limited due to body size but denies chest pain or shortness of breath with current level of exertion. He denies palpitations, presyncope, syncope, orthostasis. Weight at home today 382 lb. He hasn't been checking weights daily. Patient reports having trouble sleeping due to anxiety and being alone.  Appetite is fine. He felt maybe recent antibiotics caused some GI " upset.     PAST MEDICAL HISTORY:  Past Medical History:   Diagnosis Date     Atrial fibrillation (H)      Basal cell carcinoma      Chronic anticoagulation      Diastolic heart failure (H)      Dyslipidemia      Essential hypertension      Morbid obesity (H)      Sleep-disordered breathing      Type 2 diabetes mellitus (H)      FAMILY HISTORY:  Family History   Problem Relation Age of Onset     Depression Mother      Glaucoma Maternal Grandfather      Cancer No family hx of         No family history of skin cancer     Macular Degeneration No family hx of      SOCIAL HISTORY:  Social History     Socioeconomic History     Marital status: Single     Spouse name: Not on file     Number of children: Not on file     Years of education: Not on file     Highest education level: Not on file   Occupational History     Not on file   Social Needs     Financial resource strain: Not on file     Food insecurity:     Worry: Not on file     Inability: Not on file     Transportation needs:     Medical: Not on file     Non-medical: Not on file   Tobacco Use     Smoking status: Never Smoker     Smokeless tobacco: Never Used   Substance and Sexual Activity     Alcohol use: No     Comment: Former alcohol abuse. Quit 70s then quit later in 80s-present     Drug use: No     Comment: history of LSD use     Sexual activity: Not on file   Lifestyle     Physical activity:     Days per week: Not on file     Minutes per session: Not on file     Stress: Not on file   Relationships     Social connections:     Talks on phone: Not on file     Gets together: Not on file     Attends [employer] service: Not on file     Active member of club or organization: Not on file     Attends meetings of clubs or organizations: Not on file     Relationship status: Not on file     Intimate partner violence:     Fear of current or ex partner: Not on file     Emotionally abused: Not on file     Physically abused: Not on file     Forced sexual activity: Not on file    Other Topics Concern     Parent/sibling w/ CABG, MI or angioplasty before 65F 55M? Not Asked   Social History Narrative    Lives in an apartment in 16th floor near hospital.  Has elevators, unable to use stairs. Not able to shop; has things delivered to him including food. Difficulty completing cleaning. Goes to PCP once yearly for annual check up and medication review.     CURRENT MEDICATIONS:  ammonium lactate (LAC-HYDRIN) 12 % external cream, Apply topically 2 times daily as needed for dry skin  Ascorbic Acid (VITAMIN C) POWD, Take 500 mcg by mouth daily  aspirin (ASA) 81 MG tablet, Take 1 tablet (81 mg) by mouth daily  atorvastatin (LIPITOR) 40 MG tablet, Take 1 tablet (40 mg) by mouth daily  bacitracin 500 UNIT/GM external ointment, Apply 30 g topically 2 times daily  blood glucose (ONETOUCH ULTRA) test strip, Use to test blood sugars 2 times daily or as directed.  blood glucose monitoring (ONE TOUCH DELICA) lancets, Use to test blood sugars 2 times daily or as directed.  blood glucose monitoring (ONE TOUCH ULTRA MINI) meter device kit, Use to test blood sugars 2 times daily or as directed.  levofloxacin (LEVAQUIN) 750 MG tablet, Take 1 tablet (750 mg) by mouth daily  levothyroxine (SYNTHROID/LEVOTHROID) 175 MCG tablet, Take 1 tablet (175 mcg) by mouth every morning (before breakfast)  metFORMIN (GLUCOPHAGE) 500 MG tablet, Take 1 tablet (500 mg) by mouth 2 times daily (with meals)  metoprolol succinate ER (TOPROL-XL) 100 MG 24 hr tablet, Take 1 tablet (100 mg) by mouth daily  metroNIDAZOLE (FLAGYL) 500 MG tablet, Take 1 tablet (500 mg) by mouth 3 times daily  multivitamin w/minerals (MULTI-VITAMIN) tablet, Take 1 tablet by mouth daily  nystatin (MYCOSTATIN) 174244 UNIT/GM external cream, Apply topically 2 times daily as needed for dry skin  omega 3 1000 MG CAPS, Take 1 g by mouth daily  order for DME, Equipment being ordered: Bariatric Lift chair  Diagnosis - morbid obesity, CHF, Lymphedema    Fax to Ne  from [employer] at [phone].  order for DME, Equipment being ordered: Other: Velcro Compression stockings.   Treatment Diagnosis: bilateral lymphedema.  polyethylene glycol (MIRALAX/GLYCOLAX) packet, Take 17 g by mouth daily as needed for constipation  potassium chloride ER (K-DUR/KLOR-CON M) 20 MEQ CR tablet, Take 2 tablets (40 mEq) by mouth 2 times daily  senna-docusate (SENOKOT-S/PERICOLACE) 8.6-50 MG tablet, Take 1-2 tablets by mouth 2 times daily as needed for constipation  spironolactone (ALDACTONE) 25 MG tablet, Take 2 tablets (50 mg) by mouth daily  tamsulosin (FLOMAX) 0.4 MG capsule, Take 1 capsule (0.4 mg) by mouth daily  torsemide (DEMADEX) 100 MG tablet, Take 1 tablet (100 mg) by mouth daily Take one tab (100mg) by mouth every morning, take one half tab (50mg) by mouth every evening  vitamin B complex with vitamin C (VITAMIN  B COMPLEX) tablet, Take 1 tablet by mouth daily  vitamin E (VITAMIN E COMPLEX) 1000 units (450 mg) capsule, Take 1 capsule (1,000 Units) by mouth daily  warfarin (COUMADIN) 5 MG tablet, Take 12.5 mg by mouth  1x/week, Take 10 mg by mouth 6x per week  amoxicillin-clavulanate (AUGMENTIN) 875-125 MG tablet, Take 1 tablet by mouth 2 times daily (Patient not taking: Reported on 9/26/2019)  naproxen (NAPROSYN) 500 MG tablet, Take 1 tablet (500 mg) by mouth 2 times daily (with meals) (Patient not taking: Reported on 9/26/2019)  order for DME, Equipment being ordered: Diabetes Shoes (Patient not taking: Reported on 9/26/2019)  order for DME, Equipment being ordered: Custom diabetic shoes (Patient not taking: Reported on 9/26/2019)  traMADol (ULTRAM) 50 MG tablet, Take 1 tablet (50 mg) by mouth every 6 hours as needed for severe pain (Patient not taking: Reported on 9/26/2019)    lidocaine 1% with EPINEPHrine 1:100,000 injection 3 mL    ROS:   CONSTITUTIONAL: Denies fever, chills, fatigue.   HEENT: Denies headache, vision changes, and changes in speech.   CV: Refer to HPI.    PULMONARY:Refer to HPI.   GI:Denies nausea, vomiting, diarrhea, and abdominal pain. Bowel movements are regular.   :Denies urinary alterations, dysuria, urinary frequency, hematuria, and abnormal drainage.   EXT:+chronic lower extremity edema.   SKIN:Denies abnormal rashes or lesions.   MUSCULOSKELETAL:Denies upper or lower extremity weakness and pain.   NEUROLOGIC:Denies lightheadedness, dizziness, seizures, or upper or lower extremity paresthesia.     EXAM:  /83 (BP Location: Left arm, Patient Position: Chair, Cuff Size: Adult Large)   Pulse 88   Ht 1.829 m (6')   Wt (!) 175.1 kg (386 lb)   SpO2 97%   BMI 52.35 kg/m      GENERAL: Appears comfortable, in no acute distress.uses a walker   HEENT: Eye symmetrical, no discharge or icterus bilaterally. Mucous membranes moist and without lesions.  CV:  irregular, distant S1S2, no appreciable murmur, rub, or gallop. JVP not detected (examined in chair).   RESPIRATORY: Respirations regular, even, and unlabored. Lungs CTA throughout.   GI: Soft, obese, and non distended with normoactive bowel sounds present in all quadrants. No tenderness, rebound, guarding. No hepatomegaly.   EXTREMITIES: 1+bilat peripheral edema. 2+ bilateral pedal pulses.   NEUROLOGIC: Alert and oriented x 3. No focal deficits.   MUSCULOSKELETAL: No joint swelling or tenderness.   SKIN: No jaundice. No rashes.     Labs, reviewed with patient in clinic today:  CBC RESULTS:  Lab Results   Component Value Date    WBC 7.7 03/16/2019    RBC 4.76 03/16/2019    HGB 13.8 04/17/2019    HCT 46.0 04/17/2019    MCV 96.9 04/17/2019    MCH 29.1 04/17/2019    MCHC 30.0 (L) 04/17/2019    RDW 12.8 03/16/2019     03/16/2019       CMP RESULTS:  Lab Results   Component Value Date     09/26/2019    POTASSIUM 3.9 09/26/2019    CHLORIDE 98 09/26/2019    CO2 27 09/26/2019    ANIONGAP 8 09/26/2019    GLC 94 09/26/2019    BUN 23 09/26/2019    CR 1.20 09/26/2019    GFRESTIMATED 61 09/26/2019     GFRESTBLACK 71 09/26/2019    CHERI 9.4 09/26/2019    BILITOTAL 0.5 03/06/2019    ALBUMIN 3.5 03/06/2019    ALKPHOS 105 03/06/2019    ALT 18 03/06/2019    AST 14 03/06/2019      INR RESULTS:  Lab Results   Component Value Date    INR 1.9 09/23/2019     Lab Results   Component Value Date    MAG 2.2 03/23/2019     Lab Results   Component Value Date    NTBNPI 2,329 (H) 03/06/2019     Lab Results   Component Value Date    NTBNP 655 (H) 08/27/2019     Diagnostics:  ECG 1/6/18  Atrial fibrillation with PVCs, multifocal    TTE 10/30/18  Global and regional left ventricular function is normal with an EF of 55-60%.  No regional wall motion abnormalities are seen.  Mild to moderate right ventricular dilation is present. Global right  ventricular function is mildly to moderately reduced.  No significant valve abnormalities.  Dilation of the inferior vena cava is present with normal respiratory  variation in diameter. Trivial pericardial effusion is present with no  hemodynamic significance.     This study was compared with the study from 05/18/2018. RV apopears somewhat  more dilated but otherwise no significant change.    [facility] 6/6/18        Assessment and Plan:   Mr. Moreira is a 69 year old male with prior history of systolic heart failure, most recent LVEF normal, also hx of morbid obesity, atrial fibrillation, THONG/alveolar hypoventilation, HTN. The patient is extremely happy with how he is feeling.  We had a long discussion about resuming a small dose of Lisinopril but the patient is not in favor of doing that or any blood pressure medication at this point. He is willing to monitor his weights daily and his BP's and be more reliable with reporting any issues. He is slightly hypervolemic today and we will have him do an extra Torsemide (50 mg ) in the afternoon as a part of his daily medications.    # Chronic borderline diastolic heart failure/HFpEF with improved EF  Stage C. NYHA Class III.  Fluid status: hypervolemic  slightly today - Torsemide 100 mg in am and 50 mg in pm  ACEi/ARB/ARNI: Will need to reassess at visit in 3 months (see above)  BB: Toprol 100 mg daily  Aldosterone antagonist: spironolactone 25 mg bid (despite normal EF now, some benefit shown in TOPCAT trial)  SCD prophylaxis: n/a EF>40%  Ischemic evaluation: negative coronary angiogram 6/2018  Remote monitoring: uses [employer]     # Afib  -continue the Toprol 100 mg daily    # HTN  -controlled on HF therapies    # Atrial fibrillation ?permanant, hx of ablation per patient   [id number]   -he is on warfarin and Toprol      # THONG/alveolar hypoventilation  -encouraged consistent CPAP use     Plan and Follow up  BMP in one week  Follow up with Dr. Parsons in November with las prior    40 minutes spent face-to-face with patient, >50% in counseling and/or coordination of care as described above      Mary Carmen KIRK, CNP    [facility]       CC  GEOFFREY PARSONS

## 2019-09-26 NOTE — NURSING NOTE
Vitals completed successfully and medication reconciled.     Fara Joyce CMA  11:46 AM  Chief Complaint   Patient presents with     Follow Up     : Return CORE, Diastolic HF, labs prior.

## 2019-09-26 NOTE — LETTER
"9/26/2019      RE: Clarke Moreira  [address]  [facility] [address]       Dear Colleague,    Thank you for the opportunity to participate in the care of your patient, Clarke Moreira, at the [facility] HEART [facility]. Please see a copy of my visit note below.      HPI:   Mr. Moreira is a 69 year old male with a past medical history of systolic heart failure, most recent LVEF normal, also hx of morbid obesity, atrial fibrillation s/p ablation 2008, THONG/alveolar hypoventilation, HTN, dyslipidemia, PAD, hypothyroidism, type2 DM, depression, anxiety, BPH and a recent hospitalization for Sepsis (3/7/19-4/3/19) with sinus infection. Osteomyelitis diagnosed in March. Orthopedics recommended surgery , his podiatrist suggested to try antibiotics.   Presents to clinic for CORE follow-up.     Recent dental extraction (2/26/19) who was admitted to [facility] 3/6 for sepsis and MICHELLE. Subsequently diagnosed with maxillary sinusitis (completed abx 3/12). MICHELLE resolved with IVF. Hospital stay c/b a-fib with RVR. Transferred to TCU for rehab.     Today he reports feeling well. He was on 6 weeks of antibiotics for osteomyelitis R big toe.  He is very happy with his current medication regimen which his PCP has helped with. He is not on any Lisinopril. He is taking 100 mg of Torsemide daily and 100 mg of Toprol. Apparently he had been discharged home ( previous admission) on 100 mg Torsemide in am and 50 mg in the pm. He says his PCP discontinued the pm dose as the \"there was no benefit\".   Denies orthopnea, PND, cough, he wears CPAP. Functionally very limited due to body size but denies chest pain or shortness of breath with current level of exertion. He denies palpitations, presyncope, syncope, orthostasis. Weight at home today 382 lb. He hasn't been checking weights daily. Patient reports having trouble sleeping due to anxiety and being alone.  Appetite is fine. He felt " maybe recent antibiotics caused some GI upset.          PAST MEDICAL HISTORY:  Past Medical History:   Diagnosis Date     Atrial fibrillation (H)      Basal cell carcinoma      Chronic anticoagulation      Diastolic heart failure (H)      Dyslipidemia      Essential hypertension      Morbid obesity (H)      Sleep-disordered breathing      Type 2 diabetes mellitus (H)        FAMILY HISTORY:  Family History   Problem Relation Age of Onset     Depression Mother      Glaucoma Maternal Grandfather      Cancer No family hx of         No family history of skin cancer     Macular Degeneration No family hx of        SOCIAL HISTORY:  Social History     Socioeconomic History     Marital status: Single     Spouse name: Not on file     Number of children: Not on file     Years of education: Not on file     Highest education level: Not on file   Occupational History     Not on file   Social Needs     Financial resource strain: Not on file     Food insecurity:     Worry: Not on file     Inability: Not on file     Transportation needs:     Medical: Not on file     Non-medical: Not on file   Tobacco Use     Smoking status: Never Smoker     Smokeless tobacco: Never Used   Substance and Sexual Activity     Alcohol use: No     Comment: Former alcohol abuse. Quit 70s then quit later in 80s-present     Drug use: No     Comment: history of LSD use     Sexual activity: Not on file   Lifestyle     Physical activity:     Days per week: Not on file     Minutes per session: Not on file     Stress: Not on file   Relationships     Social connections:     Talks on phone: Not on file     Gets together: Not on file     Attends Tenriism service: Not on file     Active member of club or organization: Not on file     Attends meetings of clubs or organizations: Not on file     Relationship status: Not on file     Intimate partner violence:     Fear of current or ex partner: Not on file     Emotionally abused: Not on file     Physically abused: Not on  file     Forced sexual activity: Not on file   Other Topics Concern     Parent/sibling w/ CABG, MI or angioplasty before 65F 55M? Not Asked   Social History Narrative    Lives in an apartment in 16th floor near hospital.  Has elevators, unable to use stairs. Not able to shop; has things delivered to him including food. Difficulty completing cleaning. Goes to PCP once yearly for annual check up and medication review.       CURRENT MEDICATIONS:  ammonium lactate (LAC-HYDRIN) 12 % external cream, Apply topically 2 times daily as needed for dry skin  Ascorbic Acid (VITAMIN C) POWD, Take 500 mcg by mouth daily  aspirin (ASA) 81 MG tablet, Take 1 tablet (81 mg) by mouth daily  atorvastatin (LIPITOR) 40 MG tablet, Take 1 tablet (40 mg) by mouth daily  bacitracin 500 UNIT/GM external ointment, Apply 30 g topically 2 times daily  blood glucose (ONETOUCH ULTRA) test strip, Use to test blood sugars 2 times daily or as directed.  blood glucose monitoring (ONE TOUCH DELICA) lancets, Use to test blood sugars 2 times daily or as directed.  blood glucose monitoring (ONE TOUCH ULTRA MINI) meter device kit, Use to test blood sugars 2 times daily or as directed.  levofloxacin (LEVAQUIN) 750 MG tablet, Take 1 tablet (750 mg) by mouth daily  levothyroxine (SYNTHROID/LEVOTHROID) 175 MCG tablet, Take 1 tablet (175 mcg) by mouth every morning (before breakfast)  metFORMIN (GLUCOPHAGE) 500 MG tablet, Take 1 tablet (500 mg) by mouth 2 times daily (with meals)  metoprolol succinate ER (TOPROL-XL) 100 MG 24 hr tablet, Take 1 tablet (100 mg) by mouth daily  metroNIDAZOLE (FLAGYL) 500 MG tablet, Take 1 tablet (500 mg) by mouth 3 times daily  multivitamin w/minerals (MULTI-VITAMIN) tablet, Take 1 tablet by mouth daily  nystatin (MYCOSTATIN) 279844 UNIT/GM external cream, Apply topically 2 times daily as needed for dry skin  omega 3 1000 MG CAPS, Take 1 g by mouth daily  order for DME, Equipment being ordered: Bariatric Lift chair  Diagnosis -  morbid obesity, CHF, Lymphedema    Fax to Ne from [employer] at [phone].  order for DME, Equipment being ordered: Other: Velcro Compression stockings.   Treatment Diagnosis: bilateral lymphedema.  polyethylene glycol (MIRALAX/GLYCOLAX) packet, Take 17 g by mouth daily as needed for constipation  potassium chloride ER (K-DUR/KLOR-CON M) 20 MEQ CR tablet, Take 2 tablets (40 mEq) by mouth 2 times daily  senna-docusate (SENOKOT-S/PERICOLACE) 8.6-50 MG tablet, Take 1-2 tablets by mouth 2 times daily as needed for constipation  spironolactone (ALDACTONE) 25 MG tablet, Take 2 tablets (50 mg) by mouth daily  tamsulosin (FLOMAX) 0.4 MG capsule, Take 1 capsule (0.4 mg) by mouth daily  torsemide (DEMADEX) 100 MG tablet, Take 1 tablet (100 mg) by mouth daily Take one tab (100mg) by mouth every morning, take one half tab (50mg) by mouth every evening  vitamin B complex with vitamin C (VITAMIN  B COMPLEX) tablet, Take 1 tablet by mouth daily  vitamin E (VITAMIN E COMPLEX) 1000 units (450 mg) capsule, Take 1 capsule (1,000 Units) by mouth daily  warfarin (COUMADIN) 5 MG tablet, Take 12.5 mg by mouth  1x/week, Take 10 mg by mouth 6x per week  amoxicillin-clavulanate (AUGMENTIN) 875-125 MG tablet, Take 1 tablet by mouth 2 times daily (Patient not taking: Reported on 9/26/2019)  naproxen (NAPROSYN) 500 MG tablet, Take 1 tablet (500 mg) by mouth 2 times daily (with meals) (Patient not taking: Reported on 9/26/2019)  order for DME, Equipment being ordered: Diabetes Shoes (Patient not taking: Reported on 9/26/2019)  order for DME, Equipment being ordered: Custom diabetic shoes (Patient not taking: Reported on 9/26/2019)  traMADol (ULTRAM) 50 MG tablet, Take 1 tablet (50 mg) by mouth every 6 hours as needed for severe pain (Patient not taking: Reported on 9/26/2019)    lidocaine 1% with EPINEPHrine 1:100,000 injection 3 mL        ROS:   CONSTITUTIONAL: Denies fever, chills, fatigue.   HEENT: Denies headache, vision changes, and  changes in speech.   CV: Refer to HPI.   PULMONARY:Refer to HPI.   GI:Denies nausea, vomiting, diarrhea, and abdominal pain. Bowel movements are regular.   :Denies urinary alterations, dysuria, urinary frequency, hematuria, and abnormal drainage.   EXT:+chronic lower extremity edema.   SKIN:Denies abnormal rashes or lesions.   MUSCULOSKELETAL:Denies upper or lower extremity weakness and pain.   NEUROLOGIC:Denies lightheadedness, dizziness, seizures, or upper or lower extremity paresthesia.     EXAM:  /83 (BP Location: Left arm, Patient Position: Chair, Cuff Size: Adult Large)   Pulse 88   Ht 1.829 m (6')   Wt (!) 175.1 kg (386 lb)   SpO2 97%   BMI 52.35 kg/m      GENERAL: Appears comfortable, in no acute distress.uses a walker   HEENT: Eye symmetrical, no discharge or icterus bilaterally. Mucous membranes moist and without lesions.  CV:  irregular, distant S1S2, no appreciable murmur, rub, or gallop. JVP not detected (examined in chair).   RESPIRATORY: Respirations regular, even, and unlabored. Lungs CTA throughout.   GI: Soft, obese, and non distended with normoactive bowel sounds present in all quadrants. No tenderness, rebound, guarding. No hepatomegaly.   EXTREMITIES: 1+bilat peripheral edema. 2+ bilateral pedal pulses.   NEUROLOGIC: Alert and oriented x 3. No focal deficits.   MUSCULOSKELETAL: No joint swelling or tenderness.   SKIN: No jaundice. No rashes.     Labs, reviewed with patient in clinic today:  CBC RESULTS:  Lab Results   Component Value Date    WBC 7.7 03/16/2019    RBC 4.76 03/16/2019    HGB 13.8 04/17/2019    HCT 46.0 04/17/2019    MCV 96.9 04/17/2019    MCH 29.1 04/17/2019    MCHC 30.0 (L) 04/17/2019    RDW 12.8 03/16/2019     03/16/2019       CMP RESULTS:  Lab Results   Component Value Date     09/26/2019    POTASSIUM 3.9 09/26/2019    CHLORIDE 98 09/26/2019    CO2 27 09/26/2019    ANIONGAP 8 09/26/2019    GLC 94 09/26/2019    BUN 23 09/26/2019    CR 1.20 09/26/2019     GFRESTIMATED 61 09/26/2019    GFRESTBLACK 71 09/26/2019    CHERI 9.4 09/26/2019    BILITOTAL 0.5 03/06/2019    ALBUMIN 3.5 03/06/2019    ALKPHOS 105 03/06/2019    ALT 18 03/06/2019    AST 14 03/06/2019        INR RESULTS:  Lab Results   Component Value Date    INR 1.9 09/23/2019       Lab Results   Component Value Date    MAG 2.2 03/23/2019     Lab Results   Component Value Date    NTBNPI 2,329 (H) 03/06/2019     Lab Results   Component Value Date    NTBNP 655 (H) 08/27/2019       Diagnostics:  ECG 1/6/18  Atrial fibrillation with PVCs, multifocal    TTE 10/30/18  Global and regional left ventricular function is normal with an EF of 55-60%.  No regional wall motion abnormalities are seen.  Mild to moderate right ventricular dilation is present. Global right  ventricular function is mildly to moderately reduced.  No significant valve abnormalities.  Dilation of the inferior vena cava is present with normal respiratory  variation in diameter. Trivial pericardial effusion is present with no  hemodynamic significance.     This study was compared with the study from 05/18/2018. RV apopears somewhat  more dilated but otherwise no significant change.    RHC 6/6/18        Assessment and Plan:   Mr. Moreira is a 69 year old male with prior history of systolic heart failure, most recent LVEF normal, also hx of morbid obesity, atrial fibrillation, THONG/alveolar hypoventilation, HTN. The patient is extremely happy with how he is feeling.  We had a long discussion about resuming a small dose of Lisinopril but the patient is not in favor of doing that or any blood pressure medication at this point. He is willing to monitor his weights daily and his BP's and be more reliable with reporting any issues. He is slightly hypervolemic today and we will have him do an extra Torsemide (50 mg ) in the afternoon as a part of his daily medications.    # Chronic borderline diastolic heart failure/HFpEF with improved EF  Stage C. NYHA Class  III.  Fluid status: hypervolemic slightly today - Torsemide 100 mg in am and 50 mg in pm  ACEi/ARB/ARNI: Will need to reassess at visit in 3 months (see above)  BB: Toprol 100 mg daily  Aldosterone antagonist: spironolactone 25 mg bid (despite normal EF now, some benefit shown in TOPCAT trial)  SCD prophylaxis: n/a EF>40%  Ischemic evaluation: negative coronary angiogram 6/2018  Remote monitoring: uses [employer]     # Afib  -continue the Toprol 100 mg daily      # HTN  -controlled on HF therapies    # Atrial fibrillation ?permanant, hx of ablation per patient   [id number]   -he is on warfarin and Toprol      # THONG/alveolar hypoventilation  -encouraged consistent CPAP use     Plan and Follow up  BMP in one week  Follow up with Dr. Parsons in November with las prior      40 minutes spent face-to-face with patient, >50% in counseling and/or coordination of care as described above        Mary Carmen KIRK, CNP    CORE clinic               CC  [facility]OFF[facility]SON[facility]           Please do not hesitate to contact me if you have any questions/concerns.     Sincerely,     REAGAN José CNP

## 2019-09-26 NOTE — PATIENT INSTRUCTIONS
Take your medicines every day, as directed    Changes made today:  o Torsemide 100 mg in the am in the afternoon take 50 mg      Monitor Your Weight and Symptoms    Contact us if you:      Gain 2 pounds in one day or 5 pounds in one week    Feel more short of breath    Notice more leg swelling    Feel lightheadeded   Change your lifestyle    Limit Salt or Sodium:    2000 mg  Limit Fluids:    2000 mL or approximately 64 ounces  Eat a Heart Healthy Diet    Low in saturated fats  Stay Active:    Aim to move at least 150 minutes every  week         To Contact us    During Business Hours:  [phone], option # 1 (University)  Then option # 4 (medical questions)     After hours, weekends or holidays:   [phone], Option #4  Ask to speak to the On-Call Cardiologist. Inform them you are a CORE/heart failure patient at the [facility].     Use [employer] allows you to communicate directly with your heart team through secure messaging.    [employer] can be accessed any time on your phone, computer, or tablet.    If you need assistance, we'd be happy to help!         Keep your Heart Appointments:    Labs in one week    Dr. Willoughby in November     CORE in February

## 2019-09-30 ENCOUNTER — TELEPHONE (OUTPATIENT)
Dept: FAMILY MEDICINE | Facility: CLINIC | Age: 69
End: 2019-09-30

## 2019-09-30 ENCOUNTER — TELEPHONE (OUTPATIENT)
Dept: ORTHOPEDICS | Facility: CLINIC | Age: 69
End: 2019-09-30

## 2019-09-30 NOTE — TELEPHONE ENCOUNTER
INR Result: 4.5    Name of person that should receive call back: Kelsey     Relationship to patient: HC Nurse    Okay to leave a voicemail: Yes    Is an  needed: No

## 2019-09-30 NOTE — TELEPHONE ENCOUNTER
Called Patient per Dr. Lagos and rescheduled Clarke's 10/7 appointment to 10/4 at 8am. Patient agreed to the 10/4/19 appointment.     Cathleen Powell LPN    [facility] Call Center    Phone Message    May a detailed message be left on voicemail: yes    Reason for Call: Other: Patient's right big toe has deteriorated, as per Home Care.  Patient and Sybil from Home Care explained that right big toe's skin is loose, white, you can look deep in to to nail bed, and you can see deep brusing; however, it is not opena dn bleeding.  Please follow up with patient.  Thank you!     Action Taken: Message routed to:  [facility] ([facility]

## 2019-09-30 NOTE — TELEPHONE ENCOUNTER
Telephone encounter    INR above goal.     Hold x 2 days and then begin taking Warfarin 5 mg x 4 days  10 mg x 3 days      I have given the v/o to home care nurse. Nurse repeats the instructions.    I have also spoken to the patient today and relayed these medication orders.      Matthias Koch MD  was the authorizing prescriber for this visit through the pharmacist collaborative practice agreement.    Tj Palumbo [facility] Pharm.D.

## 2019-10-01 ENCOUNTER — DOCUMENTATION ONLY (OUTPATIENT)
Dept: FAMILY MEDICINE | Facility: CLINIC | Age: 69
End: 2019-10-01

## 2019-10-01 NOTE — PROGRESS NOTES
"When opening a documentation only encounter, be sure to enter in \"Chief Complaint\" Forms and in \" Comments\" Title of form, description if needed.    Clarke is a 69 year old  male  Form received via: Fax  Form now resides in: Provider Ready    Yajaira Longoria CMA   Form has been completed by provider.     Form sent out via: Fax to [phone]   Patient informed: NA  Output date: October 1, 2019    Yajaira Longoria CMA      **Please close the encounter**                      "

## 2019-10-04 ENCOUNTER — ANCILLARY PROCEDURE (OUTPATIENT)
Dept: GENERAL RADIOLOGY | Facility: CLINIC | Age: 69
End: 2019-10-04
Attending: PODIATRIST
Payer: COMMERCIAL

## 2019-10-04 ENCOUNTER — OFFICE VISIT (OUTPATIENT)
Dept: ORTHOPEDICS | Facility: CLINIC | Age: 69
End: 2019-10-04
Payer: COMMERCIAL

## 2019-10-04 DIAGNOSIS — B35.1 OM (ONYCHOMYCOSIS): ICD-10-CM

## 2019-10-04 DIAGNOSIS — M86.671 OTHER CHRONIC OSTEOMYELITIS OF RIGHT FOOT (H): ICD-10-CM

## 2019-10-04 DIAGNOSIS — M79.674 PAIN OF TOE OF RIGHT FOOT: ICD-10-CM

## 2019-10-04 DIAGNOSIS — L84 TYLOMA: ICD-10-CM

## 2019-10-04 DIAGNOSIS — E11.621 DIABETIC ULCER OF TOE OF LEFT FOOT ASSOCIATED WITH TYPE 2 DIABETES MELLITUS, WITH FAT LAYER EXPOSED (H): ICD-10-CM

## 2019-10-04 DIAGNOSIS — I87.8 VENOUS STASIS: ICD-10-CM

## 2019-10-04 DIAGNOSIS — E11.42 TYPE 2 DIABETES MELLITUS WITH DIABETIC POLYNEUROPATHY, UNSPECIFIED WHETHER LONG TERM INSULIN USE (H): ICD-10-CM

## 2019-10-04 DIAGNOSIS — M79.674 PAIN OF TOE OF RIGHT FOOT: Primary | ICD-10-CM

## 2019-10-04 DIAGNOSIS — L97.522 DIABETIC ULCER OF TOE OF LEFT FOOT ASSOCIATED WITH TYPE 2 DIABETES MELLITUS, WITH FAT LAYER EXPOSED (H): ICD-10-CM

## 2019-10-04 NOTE — PROGRESS NOTES
Chief Complaint   Patient presents with     RECHECK     Ulcer left middle toe and right big toe           No Known Allergies      Subjective: Clarke is a 69 year old male who presents to the clinic today for a follow up of right hallux ulceration. Home nursing relates a new issue with the nail on the right hallux. He relates that the right toe has been hurting for about a week now. Relates that he has large calluses on the left 2nd and 3rd toes. He has seen ID and is currently on Flagyl TID.     Objective  DP and PT pulses cannot be palpated.  Diminished pedal hair.    Protective sensation diminished.  He does relate some numbness in the bottom of both feet.  Nails are thickened, elongated, yellow, brittle, with subungual debris debris consistent with onychomycosis ×10.   Right lateral hallux nail is ingrown significantly. There is maceration noted to the dorsal skin fold at the IPJ of the right hallux. No open lesion noted to the right hallux.  Tylomas noted to the distal left 2nd and 3rd digits. With debridement of the 2nd tyloma, a wound was noted as described below:        A diabetic pressure wound is noted at left  distal 2nd digit measuring 0.2cm x 0.7cm x 0.1cm.    Sosa Classification: 2    Wound base: Red/Granulation  Eschar    Edges: hyperkeratotic    Drainage: scant/serosanguinous    Odor: no    Undermining: no    Bone Exposure: No    Clinical Signs of Infection: No    After obtaining patient consent, the wound was irrigated with copious amounts of saline. A scalpel was then used to debride the wound into the subcutaneous tissue. The wound edges were debrided to healthy, bleeding tissue. Given the patient's lack of sensation, no anesthesia was necessary for the procedure.     right foot xrays indicated in 3 weightbearing views.    Osteolysis noted to the distal tuft of the distal phalanx. No gas noted.       Assessment: Clarke is a 70 YO type 2 diabetic with healed right hallux ulceration. Has new  maceration to the medial aspect of the nail fold, which could be from the ingrown nail.  Does have osteolysis noted to the distal phalanx, however he recently started abx and further serial radiographs are required to continue to assess the efficacy of the abx.   Onychomycosis  Tyloma.    Plan:   - Pt seen and evaluated  - Nails debrided x 10. Ingrown nail on the right medial hallux was removed.  - Wound debrided x 1.  - Cont foams on the right hallux and now on the left 2nd digit. Change daily.  - Cont abx as directed by ID.  - Pt to return to clinic in 1 week.

## 2019-10-04 NOTE — NURSING NOTE
Reason For Visit:   Chief Complaint   Patient presents with     RECHECK     Ulcer left middle toe and right big toe        There were no vitals taken for this visit.    Pain Assessment  Patient Currently in Pain: Yes  0-10 Pain Scale: 7  Primary Pain Location: Foot    Arielle Calloway ATC

## 2019-10-04 NOTE — LETTER
10/4/2019       RE: Clarke Moreira  [address]  [facility] [id number]     Dear Colleague,    Thank you for referring your patient, Clarke Moreira, to the [facility] at [facility]. Please see a copy of my visit note below.    Chief Complaint   Patient presents with     RECHECK     Ulcer left middle toe and right big toe         No Known Allergies      Subjective: Clarke is a 69 year old male who presents to the clinic today for a follow up of right hallux ulceration. Home nursing relates a new issue with the nail on the right hallux. He relates that the right toe has been hurting for about a week now. Relates that he has large calluses on the left 2nd and 3rd toes. He has seen ID and is currently on Flagyl TID.     Objective  DP and PT pulses cannot be palpated.  Diminished pedal hair.    Protective sensation diminished.  He does relate some numbness in the bottom of both feet.  Nails are thickened, elongated, yellow, brittle, with subungual debris debris consistent with onychomycosis ×10.   Right lateral hallux nail is ingrown significantly. There is maceration noted to the dorsal skin fold at the IPJ of the right hallux. No open lesion noted to the right hallux.  Tylomas noted to the distal left 2nd and 3rd digits. With debridement of the 2nd tyloma, a wound was noted as described below:        A diabetic pressure wound is noted at left  distal 2nd digit measuring 0.2cm x 0.7cm x 0.1cm.    Sosa Classification: 2    Wound base: Red/Granulation  Eschar    Edges: hyperkeratotic    Drainage: scant/serosanguinous    Odor: no    Undermining: no    Bone Exposure: No    Clinical Signs of Infection: No    After obtaining patient consent, the wound was irrigated with copious amounts of saline. A scalpel was then used to debride the wound into the subcutaneous tissue. The wound edges were debrided to healthy, bleeding tissue. Given the patient's  lack of sensation, no anesthesia was necessary for the procedure.     right foot xrays indicated in 3 weightbearing views.    Osteolysis noted to the distal tuft of the distal phalanx. No gas noted.       Assessment: Clarke is a 70 YO type 2 diabetic with healed right hallux ulceration. Has new maceration to the medial aspect of the nail fold, which could be from the ingrown nail.  Does have osteolysis noted to the distal phalanx, however he recently started abx and further serial radiographs are required to continue to assess the efficacy of the abx.   Onychomycosis  Tyloma.    Plan:   - Pt seen and evaluated  - Nails debrided x 10. Ingrown nail on the right medial hallux was removed.  - Wound debrided x 1.  - Cont foams on the right hallux and now on the left 2nd digit. Change daily.  - Cont abx as directed by ID.  - Pt to return to clinic in 1 week.     Again, thank you for allowing me to participate in the care of your patient.      Sincerely,    Jesús Lagos DPM

## 2019-10-07 ENCOUNTER — TELEPHONE (OUTPATIENT)
Dept: FAMILY MEDICINE | Facility: CLINIC | Age: 69
End: 2019-10-07

## 2019-10-07 RX ORDER — SPIRONOLACTONE 25 MG/1
50 TABLET ORAL DAILY
Qty: 180 TABLET | Refills: 3 | Status: SHIPPED | OUTPATIENT
Start: 2019-10-07 | End: 2021-01-04

## 2019-10-07 RX ORDER — TORSEMIDE 100 MG/1
TABLET ORAL
Qty: 30 TABLET | Refills: 11 | Status: SHIPPED | OUTPATIENT
Start: 2019-10-07 | End: 2020-05-12

## 2019-10-07 NOTE — TELEPHONE ENCOUNTER
INR Result: 1.4    Name of person that should receive call back: Sybil    Relationship to patient: home care nurse    Okay to leave a voicemail: Yes    Is an  needed: No

## 2019-10-07 NOTE — TELEPHONE ENCOUNTER
"Inc dose to  10 mg m,w,f,sun  5 all other day  Follow-up 1 week.      Home care nurse calls today with a new INR result for Clarke Moreira  PCP:  Matthias Sanchez    Home Care nurse name: kristen    ANTICOAGULATION        INR goal: 2.0 - 3.0      Taking Warfarin as prescribed?: Yes    Since his last visit:    Complications: None    Diet/Med changes: None      Taking any other anticoagulation medications? : No    Current antibiotics:  On Metronidazole and levofloxacin.      Current warfarin dose:   Patient's Dose prior to today's visit: 50mg per week.    Current Warfarin dose:    Warfarin dose:    Sunday Dose: 10 mg    Monday Dose: 5 mg    Tuesday Dose: 10 mg    Wednesday Dose: 5 mg    Thursday Dose: 10 mg    Friday Dose: 5 mg    Saturday Dose: 5 mg       INR today in the home:  1.4     Lab Results   Component Value Date    INR 1.9 09/23/2019    INR 2.55 08/27/2019    INR 1.91 07/16/2019    INR 2.6 07/02/2019    INR 1.7 05/21/2019    INR 2.5 05/10/2019         Assessment and Plan:  Status:    Anticoagulation Tx: Not at goal,Dosage too low.    Previous home INR draw was September 30 where the home care nurse reported an INR of 4.5.  At that time warfarin was held for 2 days and a new regimen was created to lower though warfarin.  Rationale for decrease of the warfarin dose at that time was the addition of 2.9 inhibitors of warfarin.  Both levofloxacin and metronidazole have the potential to dramatically increase the INR.      Today the INR is below goal so we will begin increasing the warfarin dose and maintain weekly follow-up.    .    Based on today's visit:   - New Weekly Dose: 55mg Total weekly dose.   - Percent Change: 10%    Please see \"[facility] FM Anticoagulation Flowsheet\"     Warfarin    See med list and patient instructions for dosing details.     Recheck INR: 1 week is recommended.     approximate.      Warfarin Weekly Dosing  Sunday Dose: 10 mg Monday Dose: 10 mg Tuesday Dose: 5 mg Wednesday Dose: 10 mg " Thursday Dose: 5 mg Friday Dose: 10 mg Saturday Dose: 5 mg   Additional Week Dosing (will be blank, if not needed)                      Home care nurse confirms order today - repeats verbally    .    Matthias Waller was provided our recommendations via routed ntoe and Dr. Martínez was available for supervision     Tj Palumbo, Pharm.D.

## 2019-10-14 ENCOUNTER — TELEPHONE (OUTPATIENT)
Dept: WOUND CARE | Facility: CLINIC | Age: 69
End: 2019-10-14

## 2019-10-14 ENCOUNTER — TELEPHONE (OUTPATIENT)
Dept: FAMILY MEDICINE | Facility: CLINIC | Age: 69
End: 2019-10-14

## 2019-10-14 NOTE — TELEPHONE ENCOUNTER
[facility] phone call message - order or referral request from patient:     Order or referral being requested: Requested Skilled Nursing    Additional Details: Home care RN calling to request skilled nursing orders: 3 times a week for 5 weeks, 3 PRN.     Referral only -Specialty N/A Location N/A    OK to leave a message on voice mail? Yes    Primary language: English      needed? No    Call taken on October 14, 2019 at 4:11 PM by Susan Saenz    Order request route to [facility] TRIAGE   Referrals Route to [facility] (Green/R[place]/Purple) CARE COORDINATOR

## 2019-10-14 NOTE — TELEPHONE ENCOUNTER
I called Sybil back and told her that the patient should go to the emergency room. Dr. Lagos is out of the office until next week. Any signs of infection this patient should be going to the ED.    Arielle Calloway ATC

## 2019-10-14 NOTE — TELEPHONE ENCOUNTER
[facility]    Phone Message    May a detailed message be left on voicemail: yes    Reason for Call: Other: Sybil stated the patient toe is swollen to 2 times the regular size and there is redness present and he reports general achy unwell feeling. There is no fever also he stated last time he felt this way he was septic. She just wanted to make sure  had a clear picture of the patient status. Thank you.     Action Taken: Message routed to:  [facility] ([facility] ortho

## 2019-10-14 NOTE — TELEPHONE ENCOUNTER
Returned call to home care staff (Sybil), unable to reach. Left VM with verbal orders for skilled nursing per protocol as requested. Left callback number for questions.    Bladimir Campbell RN

## 2019-10-14 NOTE — TELEPHONE ENCOUNTER
[facility] Call Center    Phone Message    May a detailed message be left on voicemail: yes    Reason for Call: Symptoms or Concerns     If patient has red-flag symptoms, warm transfer to triage line    Current symptom or concern:  Puss oozing from 2nd toe on left foot    Symptoms have been present for:  2 day(s)    Has patient previously been seen for this? Yes    By : Dr. Lagos    Date: na    Are there any new or worsening symptoms? Yes: this is new since the last check on Friday.   Clarke states his home nurse noticed this today when changing his bandages.       Action Taken: Message routed to:  [facility] ([facility] Wound Clinic

## 2019-10-14 NOTE — TELEPHONE ENCOUNTER
Writer called and left pt a message on his phone that he should go and have his toe looked at in the ER. If further questions or concerns give us a call back.     Cathleen Powell LPN

## 2019-10-15 ENCOUNTER — TELEPHONE (OUTPATIENT)
Dept: INFECTIOUS DISEASES | Facility: CLINIC | Age: 69
End: 2019-10-15

## 2019-10-15 DIAGNOSIS — M86.671 OTHER CHRONIC OSTEOMYELITIS OF RIGHT FOOT (H): ICD-10-CM

## 2019-10-15 RX ORDER — METRONIDAZOLE 500 MG/1
500 TABLET ORAL 3 TIMES DAILY
Qty: 45 TABLET | Refills: 0 | Status: SHIPPED | OUTPATIENT
Start: 2019-10-15 | End: 2019-10-30

## 2019-10-15 RX ORDER — LEVOFLOXACIN 750 MG/1
750 TABLET, FILM COATED ORAL DAILY
Qty: 15 TABLET | Refills: 0 | Status: SHIPPED | OUTPATIENT
Start: 2019-10-15 | End: 2019-10-30

## 2019-10-15 NOTE — TELEPHONE ENCOUNTER
----- Message from Leopoldo Croft MD sent at 10/15/2019  4:02 PM CDT -----  Regarding: RE: Sukhdev Devine,    Yes, his 6 week period ends on the day of appointment so he will need till then.    Thank you,  Leopoldo  ----- Message -----  From: Sybil Flynn, RN  Sent: 10/14/2019  11:59 AM CDT  To: Leopoldo Croft MD  Subject: Abkolby Medina, Do you want Clarke to continue Flagyl and Levaquin until his appt with you on 10/30? I received refill requests for both and I think he may be short b/c he wasn't taking them TID at first. Thanks.  Sybil

## 2019-10-18 ENCOUNTER — MEDICAL CORRESPONDENCE (OUTPATIENT)
Dept: HEALTH INFORMATION MANAGEMENT | Facility: CLINIC | Age: 69
End: 2019-10-18

## 2019-10-21 ENCOUNTER — TELEPHONE (OUTPATIENT)
Dept: FAMILY MEDICINE | Facility: CLINIC | Age: 69
End: 2019-10-21

## 2019-10-21 LAB — INR PPP: 2.1

## 2019-10-21 NOTE — TELEPHONE ENCOUNTER
"  Home care nurse calls today with a new INR result for Clarke CHINCHILLA Moreira  PCP:  Matthias Sanchez    Home Care nurse name: Alicia-Phone [phone]    ANTICOAGULATION   Indication for therapy: Other    INR goal: 2.0 - 3.0      Taking Warfarin as prescribed?: Yes    Since his last visit:    Complications: None    Diet/Med changes: None      Taking any other anticoagulation medications? : No    Started or stopped any other medications in last 3 weeks: No    Current warfarin dose:   Patient's Dose prior to today's visit: 55mg per week.    Current Warfarin dose: No changes-55 MG Per Week. INR 2.1 (With in Therapeutic Goal 2.0-3.0 ).   Warfarin dose:    Sunday Dose: 10 mg    Monday Dose: 10 mg    Tuesday Dose: 5 mg    Wednesday Dose: 10 mg    Thursday Dose: 5 mg    Friday Dose: 10 mg    Saturday Dose: 5 mg       INR today in the home:  2.1     Lab Results   Component Value Date    INR 2.0 10/14/2019    INR 1.9 09/23/2019    INR 2.55 08/27/2019    INR 1.91 07/16/2019    INR 2.6 07/02/2019    INR 1.7 05/21/2019         Assessment and Plan:  Status:    Anticoagulation Tx: At goal,No drug therapy problem.        Based on today's visit:   - New Weekly Dose: 55mg Total weekly dose.   - Percent Change: 0%    Please see \"[facility] FM Anticoagulation Flowsheet\"     Warfarin    See med list and patient instructions for dosing details.     Recheck INR: 1 week is recommended.     approximate.      Warfarin Weekly Dosing  Sunday Dose: 10 mg Monday Dose: 10 mg Tuesday Dose: 5 mg Wednesday Dose: 10 mg Thursday Dose: 5 mg Friday Dose: 10 mg Saturday Dose: 5 mg   Additional Week Dosing (will be blank, if not needed)                      Home care nurse confirms order today - repeats verbally        Routed to PCP as an FYI of INR level and dosing.    Bladimir Campbell RN      "

## 2019-10-21 NOTE — TELEPHONE ENCOUNTER
INR Result: 2.1    Name of person that should receive call back: Alicia    Relationship to patient: [facility] RN    Okay to leave a voicemail: Yes    Is an  needed: No

## 2019-10-25 ENCOUNTER — OFFICE VISIT (OUTPATIENT)
Dept: WOUND CARE | Facility: CLINIC | Age: 69
End: 2019-10-25
Payer: COMMERCIAL

## 2019-10-25 ENCOUNTER — DOCUMENTATION ONLY (OUTPATIENT)
Dept: FAMILY MEDICINE | Facility: CLINIC | Age: 69
End: 2019-10-25

## 2019-10-25 DIAGNOSIS — L97.511 SKIN ULCER OF TOE OF RIGHT FOOT, LIMITED TO BREAKDOWN OF SKIN (H): Primary | ICD-10-CM

## 2019-10-25 DIAGNOSIS — E11.65 TYPE 2 DIABETES MELLITUS WITH HYPERGLYCEMIA, WITHOUT LONG-TERM CURRENT USE OF INSULIN (H): ICD-10-CM

## 2019-10-25 DIAGNOSIS — I87.8 VENOUS STASIS: ICD-10-CM

## 2019-10-25 NOTE — PROGRESS NOTES
Patient comes to wound clinic for wound assessment per request of dr. Lagos. He has history of a Diabetic foot ulcer  Clean gloves are donned and current dressing removed.    Objective findings:  Eschar is noted at right  distal hallux, Slight discomfort at the plantar surface with pressure.   Callus on the two left toes which were weeping last week but appear to have improved.   Called Dr Lagos to get care orders.             Subjective finding:     Pt states: doing well    Patient is assessed for discomfort which is: absent    Today's status of the wound: stabel    Treatment: Removal of existing dressing, Visual inspection, paint eschar and dry skin with Betadine per dr Lagos apply  optifoam and gauze on great toe. Ordered VICK per dr Lagos         Pt received the following instructions:    Signs and symptoms of infection taught.  Patient needs to be seen 1 week. Treated and follow-up appointment made. Dr. Lagos was available for supervision of care if needed or if questions should arise and regarding plan of care.  Becca Mccullough RN, CWON

## 2019-10-25 NOTE — PROGRESS NOTES
"When opening a documentation only encounter, be sure to enter in \"Chief Complaint\" Forms and in \" Comments\" Title of form, description if needed.    Clarke is a 69 year old  male  Form received via: Fax  Form now resides in: Provider Nabil Longoria CMA                  "

## 2019-10-28 ENCOUNTER — DOCUMENTATION ONLY (OUTPATIENT)
Dept: WOUND CARE | Facility: CLINIC | Age: 69
End: 2019-10-28

## 2019-10-28 ENCOUNTER — OFFICE VISIT (OUTPATIENT)
Dept: PSYCHOLOGY | Facility: CLINIC | Age: 69
End: 2019-10-28
Payer: COMMERCIAL

## 2019-10-28 ENCOUNTER — OFFICE VISIT (OUTPATIENT)
Dept: PHARMACY | Facility: PHYSICIAN GROUP | Age: 69
End: 2019-10-28
Payer: COMMERCIAL

## 2019-10-28 DIAGNOSIS — I48.20 CHRONIC ATRIAL FIBRILLATION (H): ICD-10-CM

## 2019-10-28 DIAGNOSIS — F33.1 MODERATE EPISODE OF RECURRENT MAJOR DEPRESSIVE DISORDER (H): Primary | ICD-10-CM

## 2019-10-28 DIAGNOSIS — I48.20 CHRONIC ATRIAL FIBRILLATION (H): Primary | ICD-10-CM

## 2019-10-28 DIAGNOSIS — I50.30 HEART FAILURE WITH PRESERVED EJECTION FRACTION, UNSPECIFIED HF CHRONICITY (H): ICD-10-CM

## 2019-10-28 DIAGNOSIS — F41.1 GAD (GENERALIZED ANXIETY DISORDER): ICD-10-CM

## 2019-10-28 LAB
ANION GAP SERPL CALCULATED.3IONS-SCNC: 7 MMOL/L (ref 3–14)
BUN SERPL-MCNC: 24 MG/DL (ref 7–30)
CALCIUM SERPL-MCNC: 9.3 MG/DL (ref 8.5–10.1)
CHLORIDE SERPL-SCNC: 96 MMOL/L (ref 94–109)
CO2 SERPL-SCNC: 32 MMOL/L (ref 20–32)
CREAT SERPL-MCNC: 1.36 MG/DL (ref 0.66–1.25)
GFR SERPL CREATININE-BSD FRML MDRD: 53 ML/MIN/{1.73_M2}
GLUCOSE SERPL-MCNC: 111 MG/DL (ref 70–99)
INR PPP: 1.9
POTASSIUM SERPL-SCNC: 4.6 MMOL/L (ref 3.4–5.3)
SODIUM SERPL-SCNC: 135 MMOL/L (ref 133–144)

## 2019-10-28 PROCEDURE — 99207 ZZC NO CHARGE LOS: CPT | Performed by: PHARMACIST

## 2019-10-28 NOTE — PROGRESS NOTES
"Clinical Pharmacy Consult:                                                      SUBJECTIVE/OBJECTIVE:                Clarke Moreira  is a 69 year old  year old male coming in for a follow-up visit for Medication Therapy Management.  He was referred to me from  Matthias Sanchez MD .        ANTICOAGULATION   Here today as a walk-in lab appointment.          No new adverse effects from warfarin   Continues to take antibiotics.      Current Warfarin dose:     Warfarin dose:     Sunday Dose: 10 mg     Monday Dose: 10 mg     Tuesday Dose: 5 mg     Wednesday Dose: 10 mg     Thursday Dose: 5 mg     Friday Dose: 10 mg     Saturday Dose: 5 mg           Lab Results   Component Value Date    INR 1.9 10/28/2019    INR 2.1 10/21/2019    INR 2.0 10/14/2019    INR 1.9 09/23/2019    INR 2.55 08/27/2019    INR 1.91 07/16/2019       ASSESSMENT:              Medication Adherence: excellent, no issues identified    ANTICOAGULATION   Status:    Anticoagulation Tx: At goal,No drug therapy problem.  Continue on current warfarin dose.    Please see \"[facility] FM Anticoagulation Flowsheet\"        PLAN:                  Warfarin    10 mg x 4 days and 5 mg x 3 days  INR follow up in 1 week       I spent 15 minutes with this patient today. All changes were made via collaborative practice agreement with Matthias Sanchez MD . A copy of the visit note was provided to the patient's primary care provider.       The patient declined a summary of these recommendations as an after visit summary.    Tj Palumbo, Pharm.D.          "

## 2019-10-28 NOTE — PROGRESS NOTES
Behavioral Health Progress Note    Client Legal Name: Clarke Moreira   Client Preferred Name: Clarke   Service Type: Individual  Length of Visit: 40 minutes  Attendees: patient     Identifying Information and Presenting Problem:    The patient is a 68 year old American male who is being seen for problematic symptoms of depression, ongoing adjustment to medical illness, as well as social isolation.  Clarke's sense of hopelessness and depression has increased significantly due to   recent developments with his health.      Treatment Objective(s) Addressed in This Session:               Clarke will practice a healthy daily discipline of diet and exercise.    Clarke will make and keep appointments with appropriate time limits to reduce disabling anxiety about leaving the                house.     Progress on / Status of Treatment Objective(s) / Homework:  Clarke states that he is terrible today.  Feeling very frustrated about difficulties with communication between different people on the healthcare team.  Realizes some of it is how he is perceiving things.     PHQ-9 SCORE 5/10/2019 8/27/2019 9/3/2019   PHQ-9 Total Score - - -   PHQ-9 Total Score 22 23 25       CHRIS-7 SCORE 12/11/2018 5/10/2019 9/3/2019   Total Score - - -   Total Score 20 13 19     Topics Discussed/Interventions Provided:    Clarke describes feeling very irritable today.  Tearful during the session.  Overwhelmed with all that is happening with his health. In the midst of all of this has had some dissociative type episodes.  He used to experience these, but hasn't for many years. Wondering if there are things he can do to help himself when he is having one of these episodes.  Provided education on grounding techniques.  Discussed different ways he can do grounding.  Practiced in the room.  Introduced him to the 93248 meditation as another alternative to grounding.  Provided reassurance that just because he is having these episodes, doesn't mean  "they are here to stay.  He is having them in relation to increased stress and they will subside again as they have in the past.      Assessment: The patient appeared to be active and engaged in today's session and was receptive to feedback.    Mental Status: Clarke appeared alert and oriented. He was dressed in a tie dye sha jacket and shorts.  Eye contact was good.  Speech was normal rate and rhythm.  Clarke uses a walker for mobility.  Mood was described as \"terrible\".  Visibly shaken up today.  Witnessed the irritability that comes out toward staff members and the guilt he experiences afterwards about acting that way.  Thought processes were logical and coherent.  Thought content was WNL with no evidence of psychotic or paranoid features. No evidence of SI/HI or self-harm, intent, or plans. Memory appeared grossly intact. Insight and judgment appeared good and patient exhibited good impulse control during the appointment.     Does the patient appear to be at imminent risk of harm to self/others at this time? No    The session was necessary to provide education and practice on how to do grounding techniques.  Symptoms of depression and anxiety have continued to interfere with his ability to function at home and cause distress that can interfere with his ability to care for himself.  Ongoing psychotherapy is necessary to provide counseling and provide support.    Diagnosis (DSM-5):  Major Depression, recurrent, moderate  Generalized Anxiety Disorder  R/o persistent depressive disorder    Plan:  1. Return in 2 weeks.  2. Continue to follow with Dr. Sanchez for medications  3.  Darby came for a lab draw at the end of the visit and Clarke was going to see Dr. Palumbo after the lab draw.       NOTE: Treatment plan updated needed on 8/12/20.  Diagnostic assessment update due 10/15/19.  "

## 2019-10-28 NOTE — PATIENT INSTRUCTIONS
"When you are feeling a dissociative episode come in, focus on things that will keep you in the present moment such as,  Feel - notice your body sitting on the chair/bench, where your feet are at on the floor  Hear - what noises do you notice around you  See - What details do you notice about the room you are sitting in  Smell - What smells do you notice around you  Taste - Are there tastes you notice, can also use a peppermint or piece of gum if helpful  Can think about making a notecard or something to carry with you that says something like \"present\"  5 4 3 2 1   The past is gone forever, the future is promised to no one, all we have is the present moment  -[place] Proverb  This exercise helps get our minds into the present moment instead of worries about the future or the past.  Look around you, if you are inside try looking out the window if possible  Notice  5 Colors: Include shades, pine trees, grass , and other leaves are all green, but they are different    4 Textures: tree bark is rough, water can be smooth, glassy, choppy   3 Sounds: computer fan, people talking, traffic, birds   2 Smells: rain, soap   1 Thing of beauty or that you feel grateful for:         "

## 2019-10-28 NOTE — PROGRESS NOTES
[facility] now requests orders and shares plan of care/discharge summaries for some patients through [employer].  Please REPLY TO THIS MESSAGE OR ROUTE BACK TO THE AUTHOR in order to give authorization for orders when needed.  This is considered a verbal order, you will still receive a faxed copy of orders for signature.  Thank you for your assistance in improving collaboration for our patients.    FYI: Pt has a HC nurse visit for wound care today and noted to have change in treatment at visit last week.  Need new orders based on new treatment listed below.    ORDER:  1.) Right Great Toe: Cleanse with wound cleanser, Paint eschar and dry skin with Betadine, optifoam and gauze, secure.  2.) Left Second Toe: Discontinue previous orders and leave LANG.

## 2019-10-29 ENCOUNTER — TELEPHONE (OUTPATIENT)
Dept: FAMILY MEDICINE | Facility: CLINIC | Age: 69
End: 2019-10-29
Payer: COMMERCIAL

## 2019-10-29 ENCOUNTER — TELEPHONE (OUTPATIENT)
Dept: INFECTIOUS DISEASES | Facility: CLINIC | Age: 69
End: 2019-10-29

## 2019-10-29 NOTE — TELEPHONE ENCOUNTER
[facility] Family Medicine phone call message - order or referral request from patient:     Order or referral being requested: Requested order     Additional Details: JAKE Patrick RN calling to request Coumadin orders and dosage change. Patient had INR lab work completed in clinic on 10/29/19.    Referral only -Specialty N/A Location N/A    OK to leave a message on voice mail? Yes    Primary language: English      needed? No    Call taken on October 29, 2019 at 9:10 AM by Susan Saenz    Order request route to [facility] TRIAGE   Referrals Route to [facility] (Green/[place]/Purple) CARE COORDINATOR

## 2019-10-29 NOTE — TELEPHONE ENCOUNTER
Patient contacted and reminded of upcoming appointment.  Patient confirmed they will be attending.  Patient instructed to bring updated medications list to appointment.    Paulette Varghese MA

## 2019-10-29 NOTE — TELEPHONE ENCOUNTER
"  Home care nurse calls today in regards to  a new INR result for Clarke Moreira  PCP:  Matthias Sanchez  Home Care nurse name: Darnell    ANTICOAGULATION     Started or stopped any other medications in last 3 weeks: No    Current warfarin dose: 55 Mg-no dose change/adjusment  Patient's Dose prior to today's visit: 55mg per week.    Current Warfarin dose:    Warfarin dose:    Sunday Dose: 10 mg    Monday Dose: 10 mg    Tuesday Dose: 5 mg    Wednesday Dose: 10 mg    Thursday Dose: 5 mg    Friday Dose: 10 mg    Saturday Dose: 5 mg       INR today in the home:  1.9     Lab Results   Component Value Date    INR 1.9 10/28/2019    INR 2.1 10/21/2019    INR 2.0 10/14/2019    INR 1.9 09/23/2019    INR 2.55 08/27/2019    INR 1.91 07/16/2019         Assessment and Plan:  Status:    Anticoagulation Tx: Not at goal,Unnecessary.      Based on today's visit:   - New Weekly Dose: 55 mg Total weekly dose.   - Percent Change: 0  -  Will continue to monitor weekly    Please see \"[facility] FM Anticoagulation Flowsheet\"     Warfarin    See med list and patient instructions for dosing details.     Warfarin Weekly Dosing  Sunday Dose: 10 mg Monday Dose: 10 mg Tuesday Dose: 5 mg Wednesday Dose: 10 mg Thursday Dose: 5 mg Friday Dose: 10 mg Saturday Dose: 5 mg   Additional Week Dosing (will be blank, if not needed)                      Home care nurse confirms order today - repeats verbally    Routed to PCP as an FYI of INR level and dosing.  Bladimir Campbell RN      "

## 2019-10-30 ENCOUNTER — MEDICAL CORRESPONDENCE (OUTPATIENT)
Dept: HEALTH INFORMATION MANAGEMENT | Facility: CLINIC | Age: 69
End: 2019-10-30

## 2019-10-30 ENCOUNTER — TELEPHONE (OUTPATIENT)
Dept: INFECTIOUS DISEASES | Facility: CLINIC | Age: 69
End: 2019-10-30

## 2019-10-30 ENCOUNTER — OFFICE VISIT (OUTPATIENT)
Dept: INFECTIOUS DISEASES | Facility: CLINIC | Age: 69
End: 2019-10-30
Attending: INTERNAL MEDICINE
Payer: COMMERCIAL

## 2019-10-30 VITALS
DIASTOLIC BLOOD PRESSURE: 71 MMHG | WEIGHT: 315 LBS | SYSTOLIC BLOOD PRESSURE: 110 MMHG | TEMPERATURE: 97.6 F | OXYGEN SATURATION: 95 % | HEART RATE: 117 BPM | BODY MASS INDEX: 51.01 KG/M2

## 2019-10-30 DIAGNOSIS — Z79.2 ENCOUNTER FOR LONG-TERM (CURRENT) USE OF ANTIBIOTICS: ICD-10-CM

## 2019-10-30 DIAGNOSIS — M86.671 OTHER CHRONIC OSTEOMYELITIS OF RIGHT FOOT (H): Primary | ICD-10-CM

## 2019-10-30 DIAGNOSIS — E11.621 DIABETIC ULCER OF TOE OF RIGHT FOOT ASSOCIATED WITH TYPE 2 DIABETES MELLITUS, WITH NECROSIS OF BONE (H): ICD-10-CM

## 2019-10-30 DIAGNOSIS — Z23 NEED FOR IMMUNIZATION AGAINST INFLUENZA: ICD-10-CM

## 2019-10-30 DIAGNOSIS — L97.514 DIABETIC ULCER OF TOE OF RIGHT FOOT ASSOCIATED WITH TYPE 2 DIABETES MELLITUS, WITH NECROSIS OF BONE (H): ICD-10-CM

## 2019-10-30 PROCEDURE — G0463 HOSPITAL OUTPT CLINIC VISIT: HCPCS | Mod: 25,ZF

## 2019-10-30 PROCEDURE — G0008 ADMIN INFLUENZA VIRUS VAC: HCPCS

## 2019-10-30 PROCEDURE — 90662 IIV NO PRSV INCREASED AG IM: CPT | Mod: ZF | Performed by: STUDENT IN AN ORGANIZED HEALTH CARE EDUCATION/TRAINING PROGRAM

## 2019-10-30 PROCEDURE — G0008 ADMIN INFLUENZA VIRUS VAC: HCPCS | Mod: ZF

## 2019-10-30 PROCEDURE — 25000128 H RX IP 250 OP 636: Mod: ZF | Performed by: STUDENT IN AN ORGANIZED HEALTH CARE EDUCATION/TRAINING PROGRAM

## 2019-10-30 RX ORDER — METRONIDAZOLE 500 MG/1
500 TABLET ORAL 3 TIMES DAILY
Qty: 45 TABLET | Refills: 0 | Status: SHIPPED | OUTPATIENT
Start: 2019-10-30 | End: 2020-05-12

## 2019-10-30 RX ORDER — LEVOFLOXACIN 750 MG/1
750 TABLET, FILM COATED ORAL DAILY
Qty: 15 TABLET | Refills: 0 | Status: SHIPPED | OUTPATIENT
Start: 2019-10-30 | End: 2020-05-12

## 2019-10-30 RX ADMIN — INFLUENZA A VIRUS A/MICHIGAN/45/2015 X-275 (H1N1) ANTIGEN (FORMALDEHYDE INACTIVATED), INFLUENZA A VIRUS A/SINGAPORE/INFIMH-16-0019/2016 IVR-186 (H3N2) ANTIGEN (FORMALDEHYDE INACTIVATED), AND INFLUENZA B VIRUS B/MARYLAND/15/2016 BX-69A (A B/COLORADO/6/2017-LIKE VIRUS) ANTIGEN (FORMALDEHYDE INACTIVATED) 0.5 ML: 60; 60; 60 INJECTION, SUSPENSION INTRAMUSCULAR at 15:01

## 2019-10-30 NOTE — Clinical Note
10/30/2019      RE: Clarke Moreira  [address]  [facility] [address]       [facility]  Infectious Disease Clinic Note:  New Patient     Patient:  Clarke Moreira, Date of birth 1950, Medical record number [id number]  Date of Visit:  10/30/2019  Consult requested by Dr. Jesús Lagos for evaluation of diabetic foot ulcer and right great toe osteomyelitis.         Assessment and Recommendations:     Assessment:    70 y/o M with DM, Afib on Warfarin, referred by Podiatry for Diabetic foot ulcer with right great toe osteomyeltis.     Based on exam and review of images, it might be worth trying a course of antibiotics as recommended by Podiatry. Culture grew mostly expected skin michael. Will treat with Levaquin and Flagyl to cover Pseudomonas, anaerobes and other expected pathogens in a diabetic foot infection.     Recommendations:  - Start Levaquin 750 mg daily plus Flagyl 500 mg TID x 6 weeks  - [facility] informed to watch his INR closely while on antibiotics  - Advised good blood sugar control to aid healing  - Follow up on 10/30/2019    Leopoldo Croft  PGY5 Infectious Diseases Fellow  Pager: [phone]         History of the Infectious Disease lllness:     Patient says his ulcer started in March when he was admitted for sepsis 2/2 a dental infection. During the admission, he was found to have what sounds like a callus/growth on the plantar aspect of his right great toe. Podiatry was consulted in the hospital and he underwent a debridement, which created the ulcer, which never ended up healing. He was found to have osteomyelitis of the distal phalanx. He was advised by Orthopedics to have the toe amputated. Patient went to Dr Lagos from Podiatry for a second opinion. Dr Lagos was of the opinion that he should get a trial of antibiotics, and that the wound can be expected to close up. He received 2 ten day courses of Augmentin. Was referred to  us for antibiotic recommendation. Podiatry also got a superficial wound culture. Patient reports no pain 2/2 peripheral neuropathy. He dresses it daily.     Review of Systems:  CONSTITUTIONAL:  No fevers or chills. No night sweats.  EYES: negative for icterus or acute vision changes.   ENT:  negative for hearing loss, tinnitus or sore throat  RESPIRATORY:  negative for cough, sputum, dyspnea  CARDIOVASCULAR:  negative for chest pain, heart palpitations  GASTROINTESTINAL:  negative for nausea, vomiting, diarrhea or constipation  GENITOURINARY:  negative for dysuria or hematuria.  HEME:  No easy bruising or bleeding  INTEGUMENT:  As above  NEURO:  Negative for headache or tremor.    Past Medical History:   Diagnosis Date     Atrial fibrillation (H)      Basal cell carcinoma      Chronic anticoagulation      Diastolic heart failure (H)      Dyslipidemia      Essential hypertension      Morbid obesity (H)      Sleep-disordered breathing      Type 2 diabetes mellitus (H)      Past Surgical History:   Procedure Laterality Date     BIOPSY OF SKIN LESION       MOHS MICROGRAPHIC PROCEDURE       PICC INSERTION Right 09/24/2017    5fr TL Bard PICC, 51cm (1cm external) in the R medial brachial vein w/ tip in the SVC.     Family History   Problem Relation Age of Onset     Depression Mother      Glaucoma Maternal Grandfather      Cancer No family hx of         No family history of skin cancer     Macular Degeneration No family hx of      Social History     Patient does not qualify to have social determinant information on file (likely too young).   Social History Narrative    Lives in an apartment in 16th floor near hospital.  Has elevators, unable to use stairs. Not able to shop; has things delivered to him including food. Difficulty completing cleaning. Goes to PCP once yearly for annual check up and medication review.     Social History     Tobacco Use     Smoking status: Never Smoker     Smokeless tobacco: Never Used    Substance Use Topics     Alcohol use: No     Comment: Former alcohol abuse. Quit 70s then quit later in 80s-present     Drug use: No     Comment: history of LSD use     Immunization History   Administered Date(s) Administered     Influenza (IIV3) PF 12/15/2005, 03/08/2007, 01/25/2008, 10/01/2010, 12/02/2011, 09/06/2017     Mantoux Tuberculin Skin Test 10/09/2017, 03/23/2019     Pneumo Conj 13-V (2010&after) 11/27/2017     Pneumococcal 23 valent 03/24/2019     TDAP Vaccine (Boostrix) 11/27/2017     Tdap (Adacel,Boostrix) 03/08/2007     Patient Active Problem List   Diagnosis     Atrial fibrillation (H)     Hypothyroidism     Basal cell carcinoma of skin of face     CTS (carpal tunnel syndrome)     Myopia     Plantar fasciitis     Presbyopia     Regular astigmatism     Neoplasm of uncertain behavior of skin     Venous stasis     Type 2 diabetes mellitus with hyperglycemia, without long-term current use of insulin (H)     Morbid obesity (H)     Depression with anxiety     Hyperlipidemia LDL goal <100     Fall     BiPAP (biphasic positive airway pressure) dependence     Lymphedema     Chronic systolic congestive heart failure (H)     Essential hypertension with goal blood pressure less than 140/90     Urinary retention     Constipation, unspecified constipation type     Cellulitis     Onychomycosis     (HFpEF) heart failure with preserved ejection fraction (H)     Chronic hypercapnic respiratory failure (H)     Hypoventilation associated with obesity syndrome (H)     Pre-syncope     Elevated serum creatinine     PAD (peripheral artery disease) (H)     THONG (obstructive sleep apnea)     Hypotension     Adequate nutrition     Osteomyelitis of right foot, unspecified type (H)     Advanced directives, counseling/discussion     Outpatient Medications Marked as Taking for the 10/30/19 encounter (Office Visit) with Leopoldo Croft MD   Medication Sig     ammonium lactate (LAC-HYDRIN) 12 % external cream Apply topically 2  times daily as needed for dry skin     amoxicillin-clavulanate (AUGMENTIN) 875-125 MG tablet Take 1 tablet by mouth 2 times daily     Ascorbic Acid (VITAMIN C) POWD Take 500 mcg by mouth daily     aspirin (ASA) 81 MG tablet Take 1 tablet (81 mg) by mouth daily     atorvastatin (LIPITOR) 40 MG tablet Take 1 tablet (40 mg) by mouth daily     bacitracin 500 UNIT/GM external ointment Apply 30 g topically 2 times daily     blood glucose (ONETOUCH ULTRA) test strip Use to test blood sugars 2 times daily or as directed.     blood glucose monitoring (ONE TOUCH DELICA) lancets Use to test blood sugars 2 times daily or as directed.     blood glucose monitoring (ONE TOUCH ULTRA MINI) meter device kit Use to test blood sugars 2 times daily or as directed.     levofloxacin (LEVAQUIN) 750 MG tablet Take 1 tablet (750 mg) by mouth daily Take until appt on 10/30.     levothyroxine (SYNTHROID/LEVOTHROID) 175 MCG tablet Take 1 tablet (175 mcg) by mouth every morning (before breakfast)     metFORMIN (GLUCOPHAGE) 500 MG tablet Take 1 tablet (500 mg) by mouth 2 times daily (with meals)     metoprolol succinate ER (TOPROL-XL) 100 MG 24 hr tablet Take 1 tablet (100 mg) by mouth daily     metroNIDAZOLE (FLAGYL) 500 MG tablet Take 1 tablet (500 mg) by mouth 3 times daily     multivitamin w/minerals (MULTI-VITAMIN) tablet Take 1 tablet by mouth daily     nystatin (MYCOSTATIN) 034827 UNIT/GM external cream Apply topically 2 times daily as needed for dry skin     omega 3 1000 MG CAPS Take 1 g by mouth daily     order for DME Equipment being ordered: Diabetes Shoes     order for DME Equipment being ordered: Custom diabetic shoes     order for DME Equipment being ordered: Bariatric Lift chair  Diagnosis - morbid obesity, CHF, Lymphedema    Fax to Ne from [employer] at [phone].     order for DME Equipment being ordered: Other: Velcro Compression stockings.   Treatment Diagnosis: bilateral lymphedema.     polyethylene glycol  (MIRALAX/GLYCOLAX) packet Take 17 g by mouth daily as needed for constipation     potassium chloride ER (K-DUR/KLOR-CON M) 20 MEQ CR tablet Take 2 tablets (40 mEq) by mouth 2 times daily     senna-docusate (SENOKOT-S/PERICOLACE) 8.6-50 MG tablet Take 1-2 tablets by mouth 2 times daily as needed for constipation     spironolactone (ALDACTONE) 25 MG tablet Take 2 tablets (50 mg) by mouth daily     tamsulosin (FLOMAX) 0.4 MG capsule Take 1 capsule (0.4 mg) by mouth daily     torsemide (DEMADEX) 100 MG tablet Take one tab (100mg) by mouth every morning, take one half tab (50mg) by mouth every evening     vitamin B complex with vitamin C (VITAMIN  B COMPLEX) tablet Take 1 tablet by mouth daily     vitamin E (VITAMIN E COMPLEX) 1000 units (450 mg) capsule Take 1 capsule (1,000 Units) by mouth daily     warfarin (COUMADIN) 5 MG tablet Take 12.5 mg by mouth  1x/week, Take 10 mg by mouth 6x per week     Current Facility-Administered Medications for the 10/30/19 encounter (Office Visit) with Leopoldo Croft MD   Medication     influenza Vac Split High-Dose (FLUZONE) injection 0.5 mL     lidocaine 1% with EPINEPHrine 1:100,000 injection 3 mL     No Known Allergies         Physical Exam:     /71   Pulse 117   Temp 97.6  F (36.4  C) (Oral)   Wt (!) 170.6 kg (376 lb 1.6 oz)   SpO2 95%   BMI 51.01 kg/m     Wt Readings from Last 4 Encounters:   10/30/19 (!) 170.6 kg (376 lb 1.6 oz)   09/26/19 (!) 175.1 kg (386 lb)   09/18/19 (!) 176.9 kg (390 lb 1.6 oz)   09/03/19 (!) 179.2 kg (395 lb)     Exam:  GENERAL: well-developed, well-nourished, alert, oriented, in no acute distress.  HEAD: Head is normocephalic, atraumatic   EYES: Eyes have anicteric sclerae.    ENT: Oropharynx is moist without exudates or ulcers.  NECK: Supple.  LUNGS: Clear to auscultation.  CARDIOVASCULAR: Regular rate and rhythm with no murmurs, gallops or rubs.  ABDOMEN: Normal bowel sounds, soft, nontender.  SKIN: No acute rashes.  NEUROLOGIC: Grossly  nonfocal.         Laboratory Data:     Inflammatory Markers    Recent Labs   Lab Test 03/06/19  1728 03/06/19  1110 01/06/18  0728 01/05/18  1109   SED 19  --   --   --    CRP  --  5.9 8.9* 6.2     Metabolic Studies    Recent Labs   Lab Test 10/28/19  1211 09/26/19  1129 08/27/19  0826  03/23/19  0624  03/13/19  1701  06/04/18  1853  01/15/18  1021  09/23/17  1049    133 136   < > 140   < >  --    < > 142   < > 132.4*   < > 125*   POTASSIUM 4.6 3.9 3.8   < > 3.1*   < >  --    < > 4.9   < > 4.3   < > 3.8   CHLORIDE 96 98 102   < > 100   < >  --    < > 104   < > 95.0*   < > 87*   CO2 32 27 28   < > 30   < >  --    < > 30   < > 24.7   < > 29   ANIONGAP 7 8 6   < > 10   < >  --    < > 7   < >  --    < > 10   BUN 24 23 16   < > 22   < >  --    < > 14   < > 20.2   < > 10   CR 1.36* 1.20 0.87   < > 0.99   < >  --    < > 0.94   < > 0.9   < > 0.77   GFRESTIMATED 53* 61 88   < > 78   < >  --    < > 80   < > >90   < > >90   * 94 132*   < > 112*   < >  --    < > 91   < > 115.1*   < > 120*   CHERI 9.3 9.4 8.9   < > 8.7   < >  --    < > 9.2   < > 10.0   < > 8.2*   PHOS  --   --   --   --   --   --   --   --   --   --  3.3   < >  --    MAG  --   --   --   --  2.2   < >  --    < > 2.0  --  2.4*   < > 1.5*   URIC  --   --   --   --   --   --   --   --  8.0*  --   --   --   --    LACT  --   --   --   --   --   --  1.5   < >  --   --   --    < >  --    CKT  --   --   --   --   --   --   --   --   --   --   --   --  84    < > = values in this interval not displayed.     Hepatic Studies    Recent Labs   Lab Test 03/06/19  1110 12/11/18  1125 08/28/18  1358 06/04/18  1853 01/05/18  1109   BILITOTAL 0.5 0.9 0.7 1.1 1.1   ALKPHOS 105 88.2 81.8 79 87   PROTTOTAL 7.5 8.0 7.6 6.8 7.6   ALBUMIN 3.5  --   --  3.3* 3.1*   AST 14 20.7 18.0 16 16   ALT 18 19.8 18.0 22 16     Pancreatitis testing    Recent Labs   Lab Test 08/28/18  1358 08/14/17  1306   TRIG 107.0 71.5     Lipid testing    Recent Labs   Lab Test 08/28/18  1358  08/14/17  1306 09/14/16  0939   CHOL 120.3 94.3 139.5   HDL 44.1 33.7* 36.6*   LDL 55 46 85   TRIG 107.0 71.5 87.1   CHOLHDLRATIO 2.7 2.8 3.8     Gout Labs      Recent Labs   Lab Test 06/04/18  1853   URIC 8.0*     Hematology Studies     Recent Labs   Lab Test 04/17/19  0914 03/16/19  0629 03/15/19  0537 03/14/19  0617 03/13/19  0613  03/06/19  1110  06/04/18  1853   WBC  --  7.7 9.1 7.6 8.9   < > 11.9*  --  9.3   ANEU  --   --   --   --   --   --  8.1  --  5.8   ALYM 1.9  --   --   --   --   --  2.5   < > 2.5   BARB  --   --   --   --   --   --  1.1  --  0.8   AEOS  --   --   --   --   --   --  0.0  --  0.2   HGB 13.8 14.7 13.9 13.9 13.7   < > 14.8   < > 13.5   HCT 46.0 45.9 43.6 43.6 43.1   < > 45.1   < > 40.8   PLT  --  238 242 239 247   < > 284  --  195    < > = values in this interval not displayed.     Clotting Studies    Recent Labs   Lab Test 10/28/19  1211 10/21/19 10/14/19 09/23/19   INR 1.9 2.1 2.0 1.9     Iron Testing    Recent Labs   Lab Test 04/17/19  0914  12/26/17  1012   IRON  --   --  60   FEB  --   --  359   IRONSAT  --   --  17   MCV 96.9   < > 95    < > = values in this interval not displayed.     Arterial Blood Gas Testing    Recent Labs   Lab Test 09/30/17  0715 09/29/17  0629 09/29/17  0257 09/27/17  2356  09/24/17  0709 09/24/17  0530 09/24/17  0502   PH  --  7.41  --   --   --  7.29* 7.14* Canceled, Test credited   PCO2  --  70*  --   --   --  66* 112* Canceled, Test credited   PO2  --  67*  --   --   --  122* 58* Canceled, Test credited   HCO3  --  44*  --   --   --  32* 38* Canceled, Test credited   O2PER 30.0 30.0 60 30   < > 80.0 60.0 60.0  Canceled, Test credited    < > = values in this interval not displayed.      Thyroid Studies     Recent Labs   Lab Test 03/12/19  0625 12/11/18  1125 03/27/18  1320 01/06/18  0728 11/10/17  1608   TSH 1.38 11.20* 3.68 6.80* 4.89*   T4  --  1.21  --   --  1.60*     Urine Studies     Recent Labs   Lab Test 03/06/19  1726 11/10/17  1059 10/08/17  1059  09/23/17  1900   URINEPH 5.0 6.0 7.0 6.0   NITRITE Negative Negative Negative Negative   LEUKEST Negative  --  Negative Negative   WBCU 0  --  <1 <1     Medication levels    Recent Labs   Lab Test 03/08/19  1024   VANCOMYCIN 25.9*     Microbiology:  Last Culture results with specimen source  Culture Micro   Date Value Ref Range Status   08/06/2019 Light growth  Staphylococcus pseudintermedius   (A)  Final   08/06/2019 (A)  Final    Light growth  Coagulase negative Staphylococcus  Susceptibility testing not routinely done     08/06/2019 (A)  Final    Moderate growth  Corynebacterium striatum  Identification obtained by MALDI-TOF mass spectrometry research use only database. Test   characteristics determined and verified by the [facility] Diagnostic Laboratory   ([facility]) [place], [place].  Susceptibility testing not routinely done     03/06/2019 No growth  Final   03/06/2019 No growth  Final   01/05/2018 No growth  Final   01/05/2018 No growth  Final   09/23/2017 No growth  Final   09/23/2017 No growth  Final   02/26/2008 No growth  Final   02/26/2008 No growth  Final   02/26/2008 No growth  Final   01/25/2008 Light growth Aspergillus niger  Final    Specimen Description   Date Value Ref Range Status   08/06/2019 Right Foot Toe Hallux Uler  Final   08/06/2019 Right Foot Toe Hallux uler  Final   03/07/2019 Nares  Final   03/06/2019 Blood Right Arm  Final   03/06/2019 Nasopharyngeal  Final   03/06/2019 Blood Unspecified Site  Final   01/05/2018 Blood Left Hand  Final   01/05/2018 Blood Right Hand  Final   09/29/2017 Nares  Final   09/23/2017 Blood Right Hand  Final   09/23/2017 Blood Left Hand  Final   02/26/2008 Blood Right Hand  Final   02/26/2008 Blood Right Hand  Final   02/26/2008 Blood Left Arm  Final   01/25/2008 Wound  Final   01/21/2007 Feces  Final        Virology:    Respiratory virus testing    Recent Labs   Lab Test 03/06/19  1117 09/24/17  0408   FLUAH3  --  Negative   HMPV  --  Negative   PIV3  --   Negative   HRVS  --  Negative   RVSPEC  --  Nasopharyngeal   RSVA  --  Negative   RSVB  --  Negative   IRSV Negative  --      Recent Labs   Lab Test 03/06/19  1117 09/24/17  0408   RVSPEC  --  Nasopharyngeal   IFLUA  --  Negative   IFLUB  --  Negative   INFZA Negative  --    INFZB Negative  --    FLUAH1  --  Negative   FLUAH3  --  Negative   [id number]  --  Negative     Hepatitis B Testing     Recent Labs   Lab Test 09/03/19  1320 09/03/19  1257   AUSAB 4.42  --    HBCAB  --  Nonreactive   HEPBANG  --  Nonreactive        Hepatitis C Antibody   Date Value Ref Range Status   11/27/2017 Nonreactive NR^Nonreactive Final     Comment:     Assay performance characteristics have not been established for newborns,   infants, and children         Imaging:  Results for orders placed or performed in visit on 08/06/19   XR Foot RT G/E 3 vw    Narrative    Exam: 3 views of the right foot dated 8/6/2019.    COMPARISON: 7/30/2019.    CLINICAL HISTORY: Osteomyelitis distal phalanx.    FINDINGS: AP, oblique, and lateral views of the right foot were  obtained. Erosive changes involving the distal aspect of the right  great toe distal phalanx again noted. Partially visualized saurav in the  distal fibula. The bones are osteopenic appearing.       Impression    IMPRESSION: Redemonstration of known erosive changes involving the  distal tuft of the right great toe distal phalanx, consistent with  known osteomyelitis.    MD Leopoldo LAUREN MD

## 2019-10-30 NOTE — TELEPHONE ENCOUNTER
[facility] Call Center    Phone Message    May a detailed message be left on voicemail: yes    Reason for Call: Medication Question or concern regarding medication   Prescription Clarification  Name of Medication: metroNIDAZOLE (FLAGYL) and levofloxacin (LEVAQUIN)  Prescribing Provider: Dr. Croft   Pharmacy: [id number] - [facility] [phone]   What on the order needs clarification? Quantity and dose. Ph. [phone]           Action Taken: Message routed to:  [facility] ([facility]): ID

## 2019-10-30 NOTE — NURSING NOTE
Chief Complaint   Patient presents with     RECHECK     6 week f/u infection of toe on right foot     RECHECK     possible allergies causing headache and itchy eyes     Blood pressure 110/71, pulse 117, temperature 97.6  F (36.4  C), temperature source Oral, weight (!) 170.6 kg (376 lb 1.6 oz), SpO2 95 %.    Farideh Collado, CMA

## 2019-10-30 NOTE — TELEPHONE ENCOUNTER
Per Dr. Medina, ok to call pharm and change quantities of abxs to 19 day supplies. Called [facility] and gave pharmacist ok to change quantities to 19 days' worth.  Sybil Flynn RN

## 2019-10-30 NOTE — PROGRESS NOTES
[facility]  Infectious Disease Clinic Note:  New Patient     Patient:  Clarke Moreira, Date of birth 1950, Medical record number [id number]  Date of Visit:  10/30/2019  Consult requested by Dr. Jesús Lagos for evaluation of diabetic foot ulcer and right great toe osteomyelitis.         Assessment and Recommendations:     Assessment:    68 y/o M with DM, Afib on Warfarin, referred by Podiatry for Diabetic foot ulcer with right great toe osteomyeltis.     Based on exam and review of images, felt it might be worth trying a course of antibiotics as recommended by Podiatry. Culture grew mostly expected skin michael. Started  Levaquin and Flagyl to cover Pseudomonas, anaerobes and other expected pathogens in a diabetic foot infection, planned 6 weeks ending Nov 4. Now at 5 weeks, here for f/u. To f/u with Podiatry, with images as deemed fit.    Ulcer healed but with eschar. Not entirely sure if underlying tissue is infected/not. Would continue antibiotics for 2 more weeks, trends  inflammatory markers. First set in a week during his scheduled labs as he has to leave early today,     Recommendations:  - Continue Levaquin 750 mg daily plus Flagyl 500 mg TID for 2 more weeks, till 11/18  - [facility] awareto watch his INR closely while on antibiotics  - WBC, ESR, CRP in 1 week and at the end of treatment  - Follow up with Podiatry, images as deemed fit  - Flu shot today    Leopoldo Croft  PGY5 Infectious Diseases Fellow  Pager: [phone]         History of the Infectious Disease lllness:     Pt's ulcer started in March when he was admitted for sepsis 2/2 a dental infection. During the admission, he was found to have what sounds like a callus/growth on the plantar aspect of his right great toe. Podiatry was consulted in the hospital and he underwent a debridement, which created the ulcer, which never ended up healing. He was found to have osteomyelitis of the distal phalanx.  He was advised by Orthopedics to have the toe amputated. Patient went to Dr Lagos from Podiatry for a second opinion. Dr Lagos was of the opinion that he should get a trial of antibiotics, and that the wound can be expected to close up. He received 2 ten day courses of Augmentin. Was referred to us for antibiotic recommendation. Podiatry also got a superficial wound culture. Patient reports no pain 2/2 peripheral neuropathy. He dresses it daily.    Based on exam and review of images, felt it might be worth trying a course of antibiotics as recommended by Podiatry. Culture grew mostly expected skin michael. Started  Levaquin and Flagyl to cover Pseudomonas, anaerobes and other expected pathogens in a diabetic foot infection, planned 6 weeks ending Nov 4. Now at 5 weeks, here for f/u. Ulcer now healed with eschar. Was oozing before but not anymore. No fevers, chills, sweats.     Review of Systems:  CONSTITUTIONAL:  No fevers or chills. No night sweats.  EYES: negative for icterus or acute vision changes.   ENT:  negative for hearing loss, tinnitus or sore throat  RESPIRATORY:  negative for cough, sputum, dyspnea  CARDIOVASCULAR:  negative for chest pain, heart palpitations  GASTROINTESTINAL:  negative for nausea, vomiting, diarrhea or constipation  GENITOURINARY:  negative for dysuria or hematuria.  HEME:  No easy bruising or bleeding  INTEGUMENT:  As above  NEURO:  Negative for headache or tremor.    Past Medical History:   Diagnosis Date     Atrial fibrillation (H)      Basal cell carcinoma      Chronic anticoagulation      Diastolic heart failure (H)      Dyslipidemia      Essential hypertension      Morbid obesity (H)      Sleep-disordered breathing      Type 2 diabetes mellitus (H)      Past Surgical History:   Procedure Laterality Date     BIOPSY OF SKIN LESION       MOHS MICROGRAPHIC PROCEDURE       PICC INSERTION Right 09/24/2017    5fr TL Bard PICC, 51cm (1cm external) in the R medial brachial vein w/ tip  in the [facility].     Family History   Problem Relation Age of Onset     Depression Mother      Glaucoma Maternal Grandfather      Cancer No family hx of         No family history of skin cancer     Macular Degeneration No family hx of      Social History     Patient does not qualify to have social determinant information on file (likely too young).   Social History Narrative    Lives in an apartment in 16th floor near hospital.  Has elevators, unable to use stairs. Not able to shop; has things delivered to him including food. Difficulty completing cleaning. Goes to PCP once yearly for annual check up and medication review.     Social History     Tobacco Use     Smoking status: Never Smoker     Smokeless tobacco: Never Used   Substance Use Topics     Alcohol use: No     Comment: Former alcohol abuse. Quit 70s then quit later in 80s-present     Drug use: No     Comment: history of LSD use     Immunization History   Administered Date(s) Administered     Influenza (IIV3) PF 12/15/2005, 03/08/2007, 01/25/2008, 10/01/2010, 12/02/2011, 09/06/2017     Mantoux Tuberculin Skin Test 10/09/2017, 03/23/2019     Pneumo Conj 13-V (2010&after) 11/27/2017     Pneumococcal 23 valent 03/24/2019     TDAP Vaccine (Boostrix) 11/27/2017     Tdap (Adacel,Boostrix) 03/08/2007     Patient Active Problem List   Diagnosis     Atrial fibrillation (H)     Hypothyroidism     Basal cell carcinoma of skin of face     CTS (carpal tunnel syndrome)     Myopia     Plantar fasciitis     Presbyopia     Regular astigmatism     Neoplasm of uncertain behavior of skin     Venous stasis     Type 2 diabetes mellitus with hyperglycemia, without long-term current use of insulin (H)     Morbid obesity (H)     Depression with anxiety     Hyperlipidemia LDL goal <100     Fall     BiPAP (biphasic positive airway pressure) dependence     Lymphedema     Chronic systolic congestive heart failure (H)     Essential hypertension with goal blood pressure less than 140/90      Urinary retention     Constipation, unspecified constipation type     Cellulitis     Onychomycosis     (HFpEF) heart failure with preserved ejection fraction (H)     Chronic hypercapnic respiratory failure (H)     Hypoventilation associated with obesity syndrome (H)     Pre-syncope     Elevated serum creatinine     PAD (peripheral artery disease) (H)     THONG (obstructive sleep apnea)     Hypotension     Adequate nutrition     Osteomyelitis of right foot, unspecified type (H)     Advanced directives, counseling/discussion     Outpatient Medications Marked as Taking for the 10/30/19 encounter (Office Visit) with Leopoldo Croft MD   Medication Sig     ammonium lactate (LAC-HYDRIN) 12 % external cream Apply topically 2 times daily as needed for dry skin     amoxicillin-clavulanate (AUGMENTIN) 875-125 MG tablet Take 1 tablet by mouth 2 times daily     Ascorbic Acid (VITAMIN C) POWD Take 500 mcg by mouth daily     aspirin (ASA) 81 MG tablet Take 1 tablet (81 mg) by mouth daily     atorvastatin (LIPITOR) 40 MG tablet Take 1 tablet (40 mg) by mouth daily     bacitracin 500 UNIT/GM external ointment Apply 30 g topically 2 times daily     blood glucose (ONETOUCH ULTRA) test strip Use to test blood sugars 2 times daily or as directed.     blood glucose monitoring (ONE TOUCH DELICA) lancets Use to test blood sugars 2 times daily or as directed.     blood glucose monitoring (ONE TOUCH ULTRA MINI) meter device kit Use to test blood sugars 2 times daily or as directed.     levofloxacin (LEVAQUIN) 750 MG tablet Take 1 tablet (750 mg) by mouth daily Take until appt on 10/30.     levothyroxine (SYNTHROID/LEVOTHROID) 175 MCG tablet Take 1 tablet (175 mcg) by mouth every morning (before breakfast)     metFORMIN (GLUCOPHAGE) 500 MG tablet Take 1 tablet (500 mg) by mouth 2 times daily (with meals)     metoprolol succinate ER (TOPROL-XL) 100 MG 24 hr tablet Take 1 tablet (100 mg) by mouth daily     metroNIDAZOLE (FLAGYL) 500 MG  tablet Take 1 tablet (500 mg) by mouth 3 times daily     multivitamin w/minerals (MULTI-VITAMIN) tablet Take 1 tablet by mouth daily     nystatin (MYCOSTATIN) 574665 UNIT/GM external cream Apply topically 2 times daily as needed for dry skin     omega 3 1000 MG CAPS Take 1 g by mouth daily     order for DME Equipment being ordered: Diabetes Shoes     order for DME Equipment being ordered: Custom diabetic shoes     order for DME Equipment being ordered: Bariatric Lift chair  Diagnosis - morbid obesity, CHF, Lymphedema    Fax to Ne from [employer] at [phone].     order for DME Equipment being ordered: Other: Velcro Compression stockings.   Treatment Diagnosis: bilateral lymphedema.     polyethylene glycol (MIRALAX/GLYCOLAX) packet Take 17 g by mouth daily as needed for constipation     potassium chloride ER (K-DUR/KLOR-CON M) 20 MEQ CR tablet Take 2 tablets (40 mEq) by mouth 2 times daily     senna-docusate (SENOKOT-S/PERICOLACE) 8.6-50 MG tablet Take 1-2 tablets by mouth 2 times daily as needed for constipation     spironolactone (ALDACTONE) 25 MG tablet Take 2 tablets (50 mg) by mouth daily     tamsulosin (FLOMAX) 0.4 MG capsule Take 1 capsule (0.4 mg) by mouth daily     torsemide (DEMADEX) 100 MG tablet Take one tab (100mg) by mouth every morning, take one half tab (50mg) by mouth every evening     vitamin B complex with vitamin C (VITAMIN  B COMPLEX) tablet Take 1 tablet by mouth daily     vitamin E (VITAMIN E COMPLEX) 1000 units (450 mg) capsule Take 1 capsule (1,000 Units) by mouth daily     warfarin (COUMADIN) 5 MG tablet Take 12.5 mg by mouth  1x/week, Take 10 mg by mouth 6x per week     Current Facility-Administered Medications for the 10/30/19 encounter (Office Visit) with Leopoldo Croft MD   Medication     influenza Vac Split High-Dose (FLUZONE) injection 0.5 mL     lidocaine 1% with EPINEPHrine 1:100,000 injection 3 mL     No Known Allergies         Physical Exam:     /71   Pulse 117    Temp 97.6  F (36.4  C) (Oral)   Wt (!) 170.6 kg (376 lb 1.6 oz)   SpO2 95%   BMI 51.01 kg/m    Wt Readings from Last 4 Encounters:   10/30/19 (!) 170.6 kg (376 lb 1.6 oz)   09/26/19 (!) 175.1 kg (386 lb)   09/18/19 (!) 176.9 kg (390 lb 1.6 oz)   09/03/19 (!) 179.2 kg (395 lb)     Exam:  GENERAL: well-developed, well-nourished, alert, oriented, in no acute distress.  HEAD: Head is normocephalic, atraumatic   EYES: Eyes have anicteric sclerae.    ENT: Oropharynx is moist without exudates or ulcers.  NECK: Supple.  LUNGS: Clear to auscultation.  CARDIOVASCULAR: Regular rate and rhythm with no murmurs, gallops or rubs.  ABDOMEN: Normal bowel sounds, soft, nontender.  SKIN: No acute rashes. Right great toe with eschar. Images in media tab reviewed.  NEUROLOGIC: Grossly nonfocal.         Laboratory Data:     Inflammatory Markers    Recent Labs   Lab Test 03/06/19  1728 03/06/19  1110 01/06/18  0728 01/05/18  1109   SED 19  --   --   --    CRP  --  5.9 8.9* 6.2     Metabolic Studies    Recent Labs   Lab Test 10/28/19  1211 09/26/19  1129 08/27/19  0826  03/23/19  0624  03/13/19  1701  06/04/18  1853  01/15/18  1021  09/23/17  1049    133 136   < > 140   < >  --    < > 142   < > 132.4*   < > 125*   POTASSIUM 4.6 3.9 3.8   < > 3.1*   < >  --    < > 4.9   < > 4.3   < > 3.8   CHLORIDE 96 98 102   < > 100   < >  --    < > 104   < > 95.0*   < > 87*   CO2 32 27 28   < > 30   < >  --    < > 30   < > 24.7   < > 29   ANIONGAP 7 8 6   < > 10   < >  --    < > 7   < >  --    < > 10   BUN 24 23 16   < > 22   < >  --    < > 14   < > 20.2   < > 10   CR 1.36* 1.20 0.87   < > 0.99   < >  --    < > 0.94   < > 0.9   < > 0.77   GFRESTIMATED 53* 61 88   < > 78   < >  --    < > 80   < > >90   < > >90   * 94 132*   < > 112*   < >  --    < > 91   < > 115.1*   < > 120*   CHERI 9.3 9.4 8.9   < > 8.7   < >  --    < > 9.2   < > 10.0   < > 8.2*   PHOS  --   --   --   --   --   --   --   --   --   --  3.3   < >  --    MAG  --   --   --    --  2.2   < >  --    < > 2.0  --  2.4*   < > 1.5*   URIC  --   --   --   --   --   --   --   --  8.0*  --   --   --   --    LACT  --   --   --   --   --   --  1.5   < >  --   --   --    < >  --    CKT  --   --   --   --   --   --   --   --   --   --   --   --  84    < > = values in this interval not displayed.     Hepatic Studies    Recent Labs   Lab Test 03/06/19  1110 12/11/18  1125 08/28/18  1358 06/04/18  1853 01/05/18  1109   BILITOTAL 0.5 0.9 0.7 1.1 1.1   ALKPHOS 105 88.2 81.8 79 87   PROTTOTAL 7.5 8.0 7.6 6.8 7.6   ALBUMIN 3.5  --   --  3.3* 3.1*   AST 14 20.7 18.0 16 16   ALT 18 19.8 18.0 22 16     Pancreatitis testing    Recent Labs   Lab Test 08/28/18  1358 08/14/17  1306   TRIG 107.0 71.5     Lipid testing    Recent Labs   Lab Test 08/28/18  1358 08/14/17  1306 09/14/16  0939   CHOL 120.3 94.3 139.5   HDL 44.1 33.7* 36.6*   LDL 55 46 85   TRIG 107.0 71.5 87.1   CHOLHDLRATIO 2.7 2.8 3.8     Gout Labs      Recent Labs   Lab Test 06/04/18  1853   URIC 8.0*     Hematology Studies     Recent Labs   Lab Test 04/17/19  0914 03/16/19  0629 03/15/19  0537 03/14/19  0617 03/13/19  0613  03/06/19  1110  06/04/18  1853   WBC  --  7.7 9.1 7.6 8.9   < > 11.9*  --  9.3   ANEU  --   --   --   --   --   --  8.1  --  5.8   ALYM 1.9  --   --   --   --   --  2.5   < > 2.5   BARB  --   --   --   --   --   --  1.1  --  0.8   AEOS  --   --   --   --   --   --  0.0  --  0.2   HGB 13.8 14.7 13.9 13.9 13.7   < > 14.8   < > 13.5   HCT 46.0 45.9 43.6 43.6 43.1   < > 45.1   < > 40.8   PLT  --  238 242 239 247   < > 284  --  195    < > = values in this interval not displayed.     Clotting Studies    Recent Labs   Lab Test 10/28/19  1211 10/21/19 10/14/19 09/23/19   INR 1.9 2.1 2.0 1.9     Iron Testing    Recent Labs   Lab Test 04/17/19  0914  12/26/17  1012   IRON  --   --  60   FEB  --   --  359   IRONSAT  --   --  17   MCV 96.9   < > 95    < > = values in this interval not displayed.     Arterial Blood Gas Testing    Recent Labs    Lab Test 09/30/17  0715 09/29/17  0629 09/29/17  0257 09/27/17  2356  09/24/17  0709 09/24/17  0530 09/24/17  0502   PH  --  7.41  --   --   --  7.29* 7.14* Canceled, Test credited   PCO2  --  70*  --   --   --  66* 112* Canceled, Test credited   PO2  --  67*  --   --   --  122* 58* Canceled, Test credited   HCO3  --  44*  --   --   --  32* 38* Canceled, Test credited   O2PER 30.0 30.0 60 30   < > 80.0 60.0 60.0  Canceled, Test credited    < > = values in this interval not displayed.      Thyroid Studies     Recent Labs   Lab Test 03/12/19  0625 12/11/18  1125 03/27/18  1320 01/06/18  0728 11/10/17  1608   TSH 1.38 11.20* 3.68 6.80* 4.89*   T4  --  1.21  --   --  1.60*     Urine Studies     Recent Labs   Lab Test 03/06/19  1726 11/10/17  1059 10/08/17  1059 09/23/17  1900   URINEPH 5.0 6.0 7.0 6.0   NITRITE Negative Negative Negative Negative   LEUKEST Negative  --  Negative Negative   WBCU 0  --  <1 <1     Medication levels    Recent Labs   Lab Test 03/08/19  1024   VANCOMYCIN 25.9*     Microbiology:  Last Culture results with specimen source  Culture Micro   Date Value Ref Range Status   08/06/2019 Light growth  Staphylococcus pseudintermedius   (A)  Final   08/06/2019 (A)  Final    Light growth  Coagulase negative Staphylococcus  Susceptibility testing not routinely done     08/06/2019 (A)  Final    Moderate growth  Corynebacterium striatum  Identification obtained by MALDI-TOF mass spectrometry research use only database. Test   characteristics determined and verified by the [facility]   ([facility]) [place], [place].  Susceptibility testing not routinely done     03/06/2019 No growth  Final   03/06/2019 No growth  Final   01/05/2018 No growth  Final   01/05/2018 No growth  Final   09/23/2017 No growth  Final   09/23/2017 No growth  Final   02/26/2008 No growth  Final   02/26/2008 No growth  Final   02/26/2008 No growth  Final   01/25/2008 Light growth Aspergillus niger  Final     Specimen Description   Date Value Ref Range Status   08/06/2019 Right Foot Toe Hallux Uler  Final   08/06/2019 Right Foot Toe Hallux uler  Final   03/07/2019 Nares  Final   03/06/2019 Blood Right Arm  Final   03/06/2019 Nasopharyngeal  Final   03/06/2019 Blood Unspecified Site  Final   01/05/2018 Blood Left Hand  Final   01/05/2018 Blood Right Hand  Final   09/29/2017 Nares  Final   09/23/2017 Blood Right Hand  Final   09/23/2017 Blood Left Hand  Final   02/26/2008 Blood Right Hand  Final   02/26/2008 Blood Right Hand  Final   02/26/2008 Blood Left Arm  Final   01/25/2008 Wound  Final   01/21/2007 Feces  Final        Virology:    Respiratory virus testing    Recent Labs   Lab Test 03/06/19  1117 09/24/17  0408   FLUAH3  --  Negative   HMPV  --  Negative   PIV3  --  Negative   HRVS  --  Negative   RVSPEC  --  Nasopharyngeal   RSVA  --  Negative   RSVB  --  Negative   IRSV Negative  --      Recent Labs   Lab Test 03/06/19  1117 09/24/17  0408   RVSPEC  --  Nasopharyngeal   IFLUA  --  Negative   IFLUB  --  Negative   INFZA Negative  --    INFZB Negative  --    FLUAH1  --  Negative   FLUAH3  --  Negative   [id number]  --  Negative     Hepatitis B Testing     Recent Labs   Lab Test 09/03/19  1320 09/03/19  1257   AUSAB 4.42  --    HBCAB  --  Nonreactive   HEPBANG  --  Nonreactive        Hepatitis C Antibody   Date Value Ref Range Status   11/27/2017 Nonreactive NR^Nonreactive Final     Comment:     Assay performance characteristics have not been established for newborns,   infants, and children         Imaging:  Results for orders placed or performed in visit on 08/06/19   XR Foot RT G/E 3 vw    Narrative    Exam: 3 views of the right foot dated 8/6/2019.    COMPARISON: 7/30/2019.    CLINICAL HISTORY: Osteomyelitis distal phalanx.    FINDINGS: AP, oblique, and lateral views of the right foot were  obtained. Erosive changes involving the distal aspect of the right  great toe distal phalanx again noted. Partially visualized  saurav in the  distal fibula. The bones are osteopenic appearing.       Impression    IMPRESSION: Redemonstration of known erosive changes involving the  distal tuft of the right great toe distal phalanx, consistent with  known osteomyelitis.    YEISON OLEA MD

## 2019-10-31 ENCOUNTER — TELEPHONE (OUTPATIENT)
Dept: FAMILY MEDICINE | Facility: CLINIC | Age: 69
End: 2019-10-31

## 2019-10-31 NOTE — TELEPHONE ENCOUNTER
[facility] phone call message - order or referral request from patient:     Order or referral being requested: Requested order Requesting orders for skilled nurse visit for one time a week for 3 weeks and 3 PRN.    Additional Details: None    Referral only -Specialty none    OK to leave a message on voice mail? Yes    Primary language: English      needed? No    Call taken on October 31, 2019 at 10:15 AM by Monse Christy    Order request route to [facility] TRIAGE   Referrals Route to [facility] (Green/Duval/Purple) CARE COORDINATOR

## 2019-10-31 NOTE — TELEPHONE ENCOUNTER
Returned call to home care nurse, unable to reach. Left VM with verbal orders for skilled nursing  per protocol as clinical needed and requested. Left callback number for questions.    Bladimir Campbell RN

## 2019-11-04 ENCOUNTER — DOCUMENTATION ONLY (OUTPATIENT)
Dept: FAMILY MEDICINE | Facility: CLINIC | Age: 69
End: 2019-11-04

## 2019-11-04 NOTE — PROGRESS NOTES
"When opening a documentation only encounter, be sure to enter in \"Chief Complaint\" Forms and in \" Comments\" Title of form, description if needed.    Clarke is a 69 year old  male  Form received via: Fax  Form now resides in: Provider Ready    Yajaira Longoria CMA      Form has been completed by provider.     Form sent out via: Fax to [phone]   Patient informed: na  Output date: November 6, 2019    Yajaira Longoria CMA      **Please close the encounter**                    "

## 2019-11-05 ENCOUNTER — ANCILLARY PROCEDURE (OUTPATIENT)
Dept: ULTRASOUND IMAGING | Facility: CLINIC | Age: 69
End: 2019-11-05
Attending: PODIATRIST
Payer: COMMERCIAL

## 2019-11-05 DIAGNOSIS — G47.33 OSA (OBSTRUCTIVE SLEEP APNEA): ICD-10-CM

## 2019-11-05 DIAGNOSIS — R06.02 SOB (SHORTNESS OF BREATH): ICD-10-CM

## 2019-11-05 DIAGNOSIS — I50.22 CHRONIC SYSTOLIC CONGESTIVE HEART FAILURE (H): ICD-10-CM

## 2019-11-05 DIAGNOSIS — I48.20 CHRONIC ATRIAL FIBRILLATION (H): ICD-10-CM

## 2019-11-05 DIAGNOSIS — M86.671 OTHER CHRONIC OSTEOMYELITIS OF RIGHT FOOT (H): ICD-10-CM

## 2019-11-05 DIAGNOSIS — I87.8 VENOUS STASIS: ICD-10-CM

## 2019-11-05 DIAGNOSIS — I50.30 HEART FAILURE WITH PRESERVED EJECTION FRACTION, UNSPECIFIED HF CHRONICITY (H): ICD-10-CM

## 2019-11-05 DIAGNOSIS — E11.65 TYPE 2 DIABETES MELLITUS WITH HYPERGLYCEMIA, WITHOUT LONG-TERM CURRENT USE OF INSULIN (H): ICD-10-CM

## 2019-11-05 DIAGNOSIS — L97.511 SKIN ULCER OF TOE OF RIGHT FOOT, LIMITED TO BREAKDOWN OF SKIN (H): ICD-10-CM

## 2019-11-05 LAB
ALBUMIN SERPL-MCNC: 3 G/DL (ref 3.4–5)
ALP SERPL-CCNC: 80 U/L (ref 40–150)
ALT SERPL W P-5'-P-CCNC: 21 U/L (ref 0–70)
ANION GAP SERPL CALCULATED.3IONS-SCNC: 8 MMOL/L (ref 3–14)
AST SERPL W P-5'-P-CCNC: 21 U/L (ref 0–45)
BASOPHILS # BLD AUTO: 0.1 10E9/L (ref 0–0.2)
BASOPHILS NFR BLD AUTO: 0.4 %
BILIRUB SERPL-MCNC: 0.4 MG/DL (ref 0.2–1.3)
BUN SERPL-MCNC: 25 MG/DL (ref 7–30)
CALCIUM SERPL-MCNC: 9 MG/DL (ref 8.5–10.1)
CHLORIDE SERPL-SCNC: 100 MMOL/L (ref 94–109)
CO2 SERPL-SCNC: 25 MMOL/L (ref 20–32)
CREAT SERPL-MCNC: 1.21 MG/DL (ref 0.66–1.25)
CRP SERPL-MCNC: <2.9 MG/L (ref 0–8)
DIFFERENTIAL METHOD BLD: ABNORMAL
EOSINOPHIL # BLD AUTO: 0.1 10E9/L (ref 0–0.7)
EOSINOPHIL NFR BLD AUTO: 1 %
ERYTHROCYTE [DISTWIDTH] IN BLOOD BY AUTOMATED COUNT: 16.8 % (ref 10–15)
ERYTHROCYTE [SEDIMENTATION RATE] IN BLOOD BY WESTERGREN METHOD: 16 MM/H (ref 0–20)
GFR SERPL CREATININE-BSD FRML MDRD: 61 ML/MIN/{1.73_M2}
GLUCOSE SERPL-MCNC: 82 MG/DL (ref 70–99)
HCT VFR BLD AUTO: 51.5 % (ref 40–53)
HGB BLD-MCNC: 16.8 G/DL (ref 13.3–17.7)
IMM GRANULOCYTES # BLD: 0 10E9/L (ref 0–0.4)
IMM GRANULOCYTES NFR BLD: 0.4 %
INR PPP: 1.78 (ref 0.86–1.14)
LYMPHOCYTES # BLD AUTO: 2.2 10E9/L (ref 0.8–5.3)
LYMPHOCYTES NFR BLD AUTO: 19.3 %
MCH RBC QN AUTO: 28.2 PG (ref 26.5–33)
MCHC RBC AUTO-ENTMCNC: 32.6 G/DL (ref 31.5–36.5)
MCV RBC AUTO: 86 FL (ref 78–100)
MONOCYTES # BLD AUTO: 1.4 10E9/L (ref 0–1.3)
MONOCYTES NFR BLD AUTO: 11.9 %
NEUTROPHILS # BLD AUTO: 7.6 10E9/L (ref 1.6–8.3)
NEUTROPHILS NFR BLD AUTO: 67 %
NRBC # BLD AUTO: 0 10*3/UL
NRBC BLD AUTO-RTO: 0 /100
PLATELET # BLD AUTO: 198 10E9/L (ref 150–450)
POTASSIUM SERPL-SCNC: 3.8 MMOL/L (ref 3.4–5.3)
PROT SERPL-MCNC: 7.3 G/DL (ref 6.8–8.8)
RBC # BLD AUTO: 5.96 10E12/L (ref 4.4–5.9)
SODIUM SERPL-SCNC: 134 MMOL/L (ref 133–144)
TSH SERPL DL<=0.005 MIU/L-ACNC: 0.75 MU/L (ref 0.4–4)
WBC # BLD AUTO: 11.4 10E9/L (ref 4–11)

## 2019-11-07 ENCOUNTER — OFFICE VISIT (OUTPATIENT)
Dept: CARDIOLOGY | Facility: CLINIC | Age: 69
End: 2019-11-07
Attending: INTERNAL MEDICINE
Payer: COMMERCIAL

## 2019-11-07 VITALS
DIASTOLIC BLOOD PRESSURE: 67 MMHG | HEART RATE: 87 BPM | OXYGEN SATURATION: 99 % | HEIGHT: 72 IN | WEIGHT: 315 LBS | BODY MASS INDEX: 42.66 KG/M2 | SYSTOLIC BLOOD PRESSURE: 128 MMHG

## 2019-11-07 DIAGNOSIS — I50.30 HEART FAILURE WITH PRESERVED EJECTION FRACTION, UNSPECIFIED HF CHRONICITY (H): ICD-10-CM

## 2019-11-07 PROCEDURE — G0463 HOSPITAL OUTPT CLINIC VISIT: HCPCS | Mod: 25,ZF

## 2019-11-07 PROCEDURE — 99215 OFFICE O/P EST HI 40 MIN: CPT | Mod: GC | Performed by: INTERNAL MEDICINE

## 2019-11-07 ASSESSMENT — MIFFLIN-ST. JEOR: SCORE: 2490.83

## 2019-11-07 ASSESSMENT — PAIN SCALES - GENERAL: PAINLEVEL: NO PAIN (0)

## 2019-11-07 NOTE — PATIENT INSTRUCTIONS
Thank you for your visit today.  Please call me with any questions or concerns.   Manas Argueta RN  Cardiology Care Coordinator  [phone], press option 1 then option 4

## 2019-11-07 NOTE — PROGRESS NOTES
Cardiovascular Medicine    I personally saw and examined this patient, discussed care with housestaff and other consultants, reviewed current laboratories and imaging studies, and conveyed impression and diagnostic/therapeutic plan to patient.    Problem List  1, Chronic systolic heart failure  2. Morbid obesity  3. Sleep disordered breathing  4. Atrial fibrillation  5. Hyperlipidemia  6. Multi-faceted shortness of breath  7. Anxiety/depression  8. Type II DM  9. Warfarin anticoagulation  10. Allergies: none  11. Lymphedema  12. Hypertension  13. Sepsis  14. Diabetic neuropathy  15. Osteomyelitis of the right great toe, on treatment    Referring:     Matthias Sanchez M.D.  Rebekah Reddy - [facility]    History    69 year old male seen for follow-up evaluation and treatment regarding congestive heart failure in the context of morbid obesity. Last time he was in clinic, he had c/o pre-syncope, sometimes positional, sometimes postprandial. His aldactone had been reduced, but now back to 25 mg BID. His lisinopril was stopped and he was not in favor of adding this back at his CORE appointment.     At this appointment, he is feeling overall better. Still some positional dizziness, but improved. He is down 4 pounds in the last week. He is not having any changes to his breathing. No exertional chest pain.  does not have an ICD.  He has poor baseline exercise tolerance, walks with a walker and has some home physical therapy.    He feels like his depression is better controlled at this time. He is not eating uncontrollably at this time. He is still having some disassociative symptoms, which are longstanding for him. He is seeing a psychotherapist.       Wt Readings from Last 24 Encounters:   10/30/19 (!) 170.6 kg (376 lb 1.6 oz)   09/26/19 (!) 175.1 kg (386 lb)   09/18/19 (!) 176.9 kg (390 lb 1.6 oz)   09/03/19 (!) 179.2 kg (395 lb)   08/27/19 (!) 178.8 kg (394 lb 3.2 oz)   06/13/19 (!) 173.9 kg (383 lb 6.4  oz)   05/20/19 (!) 173.9 kg (383 lb 6.4 oz)   05/16/19 (!) 174.5 kg (384 lb 12.8 oz)   05/10/19 (!) 174.5 kg (384 lb 12.8 oz)   04/17/19 (!) 172.7 kg (380 lb 12.8 oz)   04/02/19 (!) 168.9 kg (372 lb 6.4 oz)   04/02/19 (!) 170 kg (374 lb 12.8 oz)   03/20/19 (!) 161.8 kg (356 lb 11.3 oz)   02/19/19 (!) 175.4 kg (386 lb 9.6 oz)   12/12/18 (!) 175.1 kg (386 lb)   12/11/18 (!) 176.3 kg (388 lb 9.6 oz)   10/30/18 (!) 175.7 kg (387 lb 6.4 oz)   10/22/18 (!) 175.5 kg (387 lb)   10/10/18 (!) 173.3 kg (382 lb)   09/07/18 (!) 167.8 kg (369 lb 14.4 oz)   08/28/18 (!) 167.8 kg (370 lb)   07/18/18 (!) 170.1 kg (375 lb)   06/18/18 (!) 170.4 kg (375 lb 9.6 oz)   06/15/18 (!) 171.5 kg (378 lb 1.6 oz)       Occupational: middle management    Longstanding depression    Family: orphan    Allergy: none    Surgery: broken bones    Has bariatric bed and chair     REVIEW of SYMPTOMS    Constitutional: without fever, chills, night sweats.  Weight is down 5 lbs in the last week  HEENT: without dry eyes, dry mouth  Endocrine: without polyuria, polydypsia, polyphagia, heat or cold intolerance, changing mental status. Hemoglobin A1C modestly elevated  Cardiology: without chest pain, tightness, heaviness, pressure, paroxysmal dyspnea, orthopnea, palpitation, pre-syncope or syncope   Pulmonary: without asthma, wheezing, cough, hemoptysis  GI: without nausea, emesis, jaundice, pain, hematemesis, melena  : without frequency, urgency, hematuria, stones, pain, abnormal bleeding, frequency, urgency but history of urinary retention requiring catherization   Neurologic: without TIA, CVA, trauma, seizure  Dermatologic: without lesions, abrasion rash,   Orthopedic/Rheum: without significant joint pain, impairment, limb, polyserositis, ulceration, Raynauds  Heme: without mass, bruising, frequent infection, anemia  Psychiatric: without substance abuse, hallucination, medication, depression      Objective  /67 (BP Location: Left arm, Patient  Position: Chair, Cuff Size: Adult Regular)   Pulse 87   Ht 1.829 m (6')   Wt (!) 168.8 kg (372 lb 1.6 oz)   SpO2 99%   BMI 50.47 kg/m    Constitutional: alert, oriented, normal abnormal  Abnormal gait. and station, normal mentation.  Oral: moist mucous membranes  Lymph: without pathologic adenopathy  Chest: clear to ausculation b/l  Cor: No evidence of left or right ventricular activity.  Rhythm is regular.  S1 variablel, S2 split physiologically. Murmurs are not present  Abdomen: without tenderness, rebound, guarding, masses, ascites  Extremities: Chronic lymphedema  Neuro: no focal defects, normal mentation  Skin: without open lesions save for toe  Psych: oriented, verbal, mental status in tact      Meds    Current Outpatient Medications   Medication     ammonium lactate (LAC-HYDRIN) 12 % external cream     amoxicillin-clavulanate (AUGMENTIN) 875-125 MG tablet     Ascorbic Acid (VITAMIN C) POWD     aspirin (ASA) 81 MG tablet     atorvastatin (LIPITOR) 40 MG tablet     bacitracin 500 UNIT/GM external ointment     blood glucose (ONETOUCH ULTRA) test strip     blood glucose monitoring (ONE TOUCH DELICA) lancets     blood glucose monitoring (ONE TOUCH ULTRA MINI) meter device kit     levofloxacin (LEVAQUIN) 750 MG tablet     levothyroxine (SYNTHROID/LEVOTHROID) 175 MCG tablet     metFORMIN (GLUCOPHAGE) 500 MG tablet     metoprolol succinate ER (TOPROL-XL) 100 MG 24 hr tablet     metroNIDAZOLE (FLAGYL) 500 MG tablet     multivitamin w/minerals (MULTI-VITAMIN) tablet     naproxen (NAPROSYN) 500 MG tablet     nystatin (MYCOSTATIN) 087190 UNIT/GM external cream     omega 3 1000 MG CAPS     order for DME     order for DME     order for DME     order for DME     polyethylene glycol (MIRALAX/GLYCOLAX) packet     potassium chloride ER (K-DUR/KLOR-CON M) 20 MEQ CR tablet     senna-docusate (SENOKOT-S/PERICOLACE) 8.6-50 MG tablet     spironolactone (ALDACTONE) 25 MG tablet     tamsulosin (FLOMAX) 0.4 MG capsule      torsemide (DEMADEX) 100 MG tablet     traMADol (ULTRAM) 50 MG tablet     vitamin B complex with vitamin C (VITAMIN  B COMPLEX) tablet     vitamin E (VITAMIN E COMPLEX) 1000 units (450 mg) capsule     warfarin (COUMADIN) 5 MG tablet     Current Facility-Administered Medications   Medication     lidocaine 1% with EPINEPHrine 1:100,000 injection 3 mL     Labs  Results for AJIT CALLAHAN (MRN [id number]) as of 11/7/2019 09:07   Ref. Range 11/5/2019 11:23   Sodium Latest Ref Range: 133 - 144 mmol/L 134   Potassium Latest Ref Range: 3.4 - 5.3 mmol/L 3.8   Chloride Latest Ref Range: 94 - 109 mmol/L 100   Carbon Dioxide Latest Ref Range: 20 - 32 mmol/L 25   Urea Nitrogen Latest Ref Range: 7 - 30 mg/dL 25   Creatinine Latest Ref Range: 0.66 - 1.25 mg/dL 1.21   GFR Estimate Latest Ref Range: >60 mL/min/1.73_m2 61   GFR Estimate If Black Latest Ref Range: >60 mL/min/1.73_m2 70   Calcium Latest Ref Range: 8.5 - 10.1 mg/dL 9.0   Anion Gap Latest Ref Range: 3 - 14 mmol/L 8   Albumin Latest Ref Range: 3.4 - 5.0 g/dL 3.0 (L)   Protein Total Latest Ref Range: 6.8 - 8.8 g/dL 7.3   Bilirubin Total Latest Ref Range: 0.2 - 1.3 mg/dL 0.4   Alkaline Phosphatase Latest Ref Range: 40 - 150 U/L 80   ALT Latest Ref Range: 0 - 70 U/L 21   AST Latest Ref Range: 0 - 45 U/L 21   CRP Inflammation Latest Ref Range: 0.0 - 8.0 mg/L <2.9   TSH Latest Ref Range: 0.40 - 4.00 mU/L 0.75   Glucose Latest Ref Range: 70 - 99 mg/dL 82   WBC Latest Ref Range: 4.0 - 11.0 10e9/L 11.4 (H)   Hemoglobin Latest Ref Range: 13.3 - 17.7 g/dL 16.8   Hematocrit Latest Ref Range: 40.0 - 53.0 % 51.5   Platelet Count Latest Ref Range: 150 - 450 10e9/L 198   RBC Count Latest Ref Range: 4.4 - 5.9 10e12/L 5.96 (H)   MCV Latest Ref Range: 78 - 100 fl 86   MCH Latest Ref Range: 26.5 - 33.0 pg 28.2   MCHC Latest Ref Range: 31.5 - 36.5 g/dL 32.6   RDW Latest Ref Range: 10.0 - 15.0 % 16.8 (H)   Diff Method Unknown Automated Method   % Neutrophils Latest Units: % 67.0   %  Lymphocytes Latest Units: % 19.3   % Monocytes Latest Units: % 11.9   % Eosinophils Latest Units: % 1.0   % Basophils Latest Units: % 0.4   % Immature Granulocytes Latest Units: % 0.4   Nucleated RBCs Latest Ref Range: 0 /100 0   Absolute Neutrophil Latest Ref Range: 1.6 - 8.3 10e9/L 7.6   Absolute Lymphocytes Latest Ref Range: 0.8 - 5.3 10e9/L 2.2   Absolute Monocytes Latest Ref Range: 0.0 - 1.3 10e9/L 1.4 (H)   Absolute Eosinophils Latest Ref Range: 0.0 - 0.7 10e9/L 0.1   Absolute Basophils Latest Ref Range: 0.0 - 0.2 10e9/L 0.1   Abs Immature Granulocytes Latest Ref Range: 0 - 0.4 10e9/L 0.0   Absolute Nucleated RBC Unknown 0.0   Sed Rate Latest Ref Range: 0 - 20 mm/h 16   INR Latest Ref Range: 0.86 - 1.14  1.78 (H)       Imaging     3/2019 ECHO  Interpretation Summary  Borderline (EF 50-55%) reduced left ventricular function is present.  Right ventricular systolic dysfunction appears mild.  No significant valve dysfunction.  No vegetation detected.    2018 ECHO  Interpretation Summary  Technically difficult study. The patient's rhythm is atrial fibrillation.  Global and regional left ventricular function is normal with an EF of 55-60%.  Global right ventricular function is mildly reduced.  No significant valvular abnormalities were noted.  Dilation of the inferior vena cava is present with normal respiratory  variation in diameter.Estimated mean right atrial pressure is 8 mmHg.  No pericardial effusion is present.    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 MRA for further evaluation.      Comparison MRA: None.      1. The aortic root is mildly dilated, measuring 3.9 cm at the level of sinus of Valsalva.     2. The ascending aorta measures 3.9 cm. The transverse arch and descending thoracic aorta are normal in  size without an aneurysm or dissection.     2.  The aortic arch is left sided. The brachiocaphalic and left common carotid arteries both have common  origin. There is no coarctation.     3. The main and proximal branch pulmonary arteries are normal in size.      4. The systemic venous connections are normal.      6/4/2018 RHC and Coronary Angiogram        Assessment/Plan   He is doing better at this visit than in the past. He appears euvolemic. His depression is under better control. We will not make any medication changes today. His lisinopril was discontinued due to pre-syncope at prior visits, we could consider adding back cautiously in the future, but we held off for today, especially as is EF is now normal.     # Chronic borderline diastolic heart failure/HFpEF with improved EF  Stage C. NYHA Class III- although confounded by body habitus and comorbidities   Fluid status: Euvolemic today, continue current torsemide  ACEi/ARB/ARNI: Will need to reassess at visit in 3 months (see above)  BB: Toprol 100 mg daily  Aldosterone antagonist: spironolactone 50 mg daily (despite normal EF now, some benefit shown in TOPCAT trial)  SCD prophylaxis: n/a EF>40%  Ischemic evaluation: negative coronary angiogram 6/2018  Remote monitoring: uses [employer]      # Afib  -continue the Toprol 100 mg daily     # HTN  -controlled on HF therapies     # Atrial fibrillation ?permanant, hx of ablation per patient   [id number]   -he is on warfarin and Toprol     # THONG/alveolar hypoventilation  -encouraged consistent CPAP use       Follow-up in January with Dr. Willoughby or CORE      I saw patient in shared visit format and confirmed history and physical and jointly formulated plan to patient.        CC  Matthias Sanchez  [facility]  Dilia Greene, Ph.D. [employer]

## 2019-11-07 NOTE — NURSING NOTE
Chief Complaint   Patient presents with     Follow Up     history of systolic heart failure, most recent LVEF normal, and atrial fibrillation     Vitals were taken and medications were reconciled.   Suzanne Jerome  8:42 AM

## 2019-11-07 NOTE — LETTER
11/7/2019      RE: Clarke Moreira  [address]  [place] [id number]       Dear Colleague,    Thank you for the opportunity to participate in the care of your patient, Clarke Moreira, at the [facility]. Please see a copy of my visit note below.      Cardiovascular Medicine    I personally saw and examined this patient, discussed care with housestaff and other consultants, reviewed current laboratories and imaging studies, and conveyed impression and diagnostic/therapeutic plan to patient.    Problem List  1, Chronic systolic heart failure  2. Morbid obesity  3. Sleep disordered breathing  4. Atrial fibrillation  5. Hyperlipidemia  6. Multi-faceted shortness of breath  7. Anxiety/depression  8. Type II DM  9. Warfarin anticoagulation  10. Allergies: none  11. Lymphedema  12. Hypertension  13. Sepsis  14. Diabetic neuropathy  15. Osteomyelitis of the right great toe, on treatment    Referring:     Matthias Sanchez M.D.  Rebekah Reddy - [facility]    History    69 year old male seen for follow-up evaluation and treatment regarding congestive heart failure in the context of morbid obesity. Last time he was in clinic, he had c/o pre-syncope, sometimes positional, sometimes postprandial. His aldactone had been reduced, but now back to 25 mg BID. His lisinopril was stopped and he was not in favor of adding this back at his CORE appointment.     At this appointment, he is feeling overall better. Still some positional dizziness, but improved. He is down 4 pounds in the last week. He is not having any changes to his breathing. No exertional chest pain.  does not have an ICD.  He has poor baseline exercise tolerance, walks with a walker and has some home physical therapy.    He feels like his depression is better controlled at this time. He is not eating uncontrollably at this time. He is still having some disassociative symptoms,  which are longstanding for him. He is seeing a psychotherapist.       Wt Readings from Last 24 Encounters:   10/30/19 (!) 170.6 kg (376 lb 1.6 oz)   09/26/19 (!) 175.1 kg (386 lb)   09/18/19 (!) 176.9 kg (390 lb 1.6 oz)   09/03/19 (!) 179.2 kg (395 lb)   08/27/19 (!) 178.8 kg (394 lb 3.2 oz)   06/13/19 (!) 173.9 kg (383 lb 6.4 oz)   05/20/19 (!) 173.9 kg (383 lb 6.4 oz)   05/16/19 (!) 174.5 kg (384 lb 12.8 oz)   05/10/19 (!) 174.5 kg (384 lb 12.8 oz)   04/17/19 (!) 172.7 kg (380 lb 12.8 oz)   04/02/19 (!) 168.9 kg (372 lb 6.4 oz)   04/02/19 (!) 170 kg (374 lb 12.8 oz)   03/20/19 (!) 161.8 kg (356 lb 11.3 oz)   02/19/19 (!) 175.4 kg (386 lb 9.6 oz)   12/12/18 (!) 175.1 kg (386 lb)   12/11/18 (!) 176.3 kg (388 lb 9.6 oz)   10/30/18 (!) 175.7 kg (387 lb 6.4 oz)   10/22/18 (!) 175.5 kg (387 lb)   10/10/18 (!) 173.3 kg (382 lb)   09/07/18 (!) 167.8 kg (369 lb 14.4 oz)   08/28/18 (!) 167.8 kg (370 lb)   07/18/18 (!) 170.1 kg (375 lb)   06/18/18 (!) 170.4 kg (375 lb 9.6 oz)   06/15/18 (!) 171.5 kg (378 lb 1.6 oz)       Occupational: middle management    Longstanding depression    Family: orphan    Allergy: none    Surgery: broken bones    Has bariatric bed and chair     REVIEW of SYMPTOMS    Constitutional: without fever, chills, night sweats.  Weight is down 5 lbs in the last week  HEENT: without dry eyes, dry mouth  Endocrine: without polyuria, polydypsia, polyphagia, heat or cold intolerance, changing mental status. Hemoglobin A1C modestly elevated  Cardiology: without chest pain, tightness, heaviness, pressure, paroxysmal dyspnea, orthopnea, palpitation, pre-syncope or syncope   Pulmonary: without asthma, wheezing, cough, hemoptysis  GI: without nausea, emesis, jaundice, pain, hematemesis, melena  : without frequency, urgency, hematuria, stones, pain, abnormal bleeding, frequency, urgency but history of urinary retention requiring catherization   Neurologic: without TIA, CVA, trauma, seizure  Dermatologic: without  lesions, abrasion rash,   Orthopedic/Rheum: without significant joint pain, impairment, limb, polyserositis, ulceration, Raynauds  Heme: without mass, bruising, frequent infection, anemia  Psychiatric: without substance abuse, hallucination, medication, depression      Objective  /67 (BP Location: Left arm, Patient Position: Chair, Cuff Size: Adult Regular)   Pulse 87   Ht 1.829 m (6')   Wt (!) 168.8 kg (372 lb 1.6 oz)   SpO2 99%   BMI 50.47 kg/m     Constitutional: alert, oriented, normal abnormal  Abnormal gait. and station, normal mentation.  Oral: moist mucous membranes  Lymph: without pathologic adenopathy  Chest: clear to ausculation b/l  Cor: No evidence of left or right ventricular activity.  Rhythm is regular.  S1 variablel, S2 split physiologically. Murmurs are not present  Abdomen: without tenderness, rebound, guarding, masses, ascites  Extremities: Chronic lymphedema  Neuro: no focal defects, normal mentation  Skin: without open lesions save for toe  Psych: oriented, verbal, mental status in tact      Meds    Current Outpatient Medications   Medication     ammonium lactate (LAC-HYDRIN) 12 % external cream     amoxicillin-clavulanate (AUGMENTIN) 875-125 MG tablet     Ascorbic Acid (VITAMIN C) POWD     aspirin (ASA) 81 MG tablet     atorvastatin (LIPITOR) 40 MG tablet     bacitracin 500 UNIT/GM external ointment     blood glucose (ONETOUCH ULTRA) test strip     blood glucose monitoring (ONE TOUCH DELICA) lancets     blood glucose monitoring (ONE TOUCH ULTRA MINI) meter device kit     levofloxacin (LEVAQUIN) 750 MG tablet     levothyroxine (SYNTHROID/LEVOTHROID) 175 MCG tablet     metFORMIN (GLUCOPHAGE) 500 MG tablet     metoprolol succinate ER (TOPROL-XL) 100 MG 24 hr tablet     metroNIDAZOLE (FLAGYL) 500 MG tablet     multivitamin w/minerals (MULTI-VITAMIN) tablet     naproxen (NAPROSYN) 500 MG tablet     nystatin (MYCOSTATIN) 692992 UNIT/GM external cream     omega 3 1000 MG CAPS     order for  DME     order for DME     order for DME     order for DME     polyethylene glycol (MIRALAX/GLYCOLAX) packet     potassium chloride ER (K-DUR/KLOR-CON M) 20 MEQ CR tablet     senna-docusate (SENOKOT-S/PERICOLACE) 8.6-50 MG tablet     spironolactone (ALDACTONE) 25 MG tablet     tamsulosin (FLOMAX) 0.4 MG capsule     torsemide (DEMADEX) 100 MG tablet     traMADol (ULTRAM) 50 MG tablet     vitamin B complex with vitamin C (VITAMIN  B COMPLEX) tablet     vitamin E (VITAMIN E COMPLEX) 1000 units (450 mg) capsule     warfarin (COUMADIN) 5 MG tablet     Current Facility-Administered Medications   Medication     lidocaine 1% with EPINEPHrine 1:100,000 injection 3 mL     Labs  Results for AJIT CALLAHAN (MRN [id number]) as of 11/7/2019 09:07   Ref. Range 11/5/2019 11:23   Sodium Latest Ref Range: 133 - 144 mmol/L 134   Potassium Latest Ref Range: 3.4 - 5.3 mmol/L 3.8   Chloride Latest Ref Range: 94 - 109 mmol/L 100   Carbon Dioxide Latest Ref Range: 20 - 32 mmol/L 25   Urea Nitrogen Latest Ref Range: 7 - 30 mg/dL 25   Creatinine Latest Ref Range: 0.66 - 1.25 mg/dL 1.21   GFR Estimate Latest Ref Range: >60 mL/min/1.73_m2 61   GFR Estimate If Black Latest Ref Range: >60 mL/min/1.73_m2 70   Calcium Latest Ref Range: 8.5 - 10.1 mg/dL 9.0   Anion Gap Latest Ref Range: 3 - 14 mmol/L 8   Albumin Latest Ref Range: 3.4 - 5.0 g/dL 3.0 (L)   Protein Total Latest Ref Range: 6.8 - 8.8 g/dL 7.3   Bilirubin Total Latest Ref Range: 0.2 - 1.3 mg/dL 0.4   Alkaline Phosphatase Latest Ref Range: 40 - 150 U/L 80   ALT Latest Ref Range: 0 - 70 U/L 21   AST Latest Ref Range: 0 - 45 U/L 21   CRP Inflammation Latest Ref Range: 0.0 - 8.0 mg/L <2.9   TSH Latest Ref Range: 0.40 - 4.00 mU/L 0.75   Glucose Latest Ref Range: 70 - 99 mg/dL 82   WBC Latest Ref Range: 4.0 - 11.0 10e9/L 11.4 (H)   Hemoglobin Latest Ref Range: 13.3 - 17.7 g/dL 16.8   Hematocrit Latest Ref Range: 40.0 - 53.0 % 51.5   Platelet Count Latest Ref Range: 150 - 450 10e9/L 198    RBC Count Latest Ref Range: 4.4 - 5.9 10e12/L 5.96 (H)   MCV Latest Ref Range: 78 - 100 fl 86   MCH Latest Ref Range: 26.5 - 33.0 pg 28.2   MCHC Latest Ref Range: 31.5 - 36.5 g/dL 32.6   RDW Latest Ref Range: 10.0 - 15.0 % 16.8 (H)   Diff Method Unknown Automated Method   % Neutrophils Latest Units: % 67.0   % Lymphocytes Latest Units: % 19.3   % Monocytes Latest Units: % 11.9   % Eosinophils Latest Units: % 1.0   % Basophils Latest Units: % 0.4   % Immature Granulocytes Latest Units: % 0.4   Nucleated RBCs Latest Ref Range: 0 /100 0   Absolute Neutrophil Latest Ref Range: 1.6 - 8.3 10e9/L 7.6   Absolute Lymphocytes Latest Ref Range: 0.8 - 5.3 10e9/L 2.2   Absolute Monocytes Latest Ref Range: 0.0 - 1.3 10e9/L 1.4 (H)   Absolute Eosinophils Latest Ref Range: 0.0 - 0.7 10e9/L 0.1   Absolute Basophils Latest Ref Range: 0.0 - 0.2 10e9/L 0.1   Abs Immature Granulocytes Latest Ref Range: 0 - 0.4 10e9/L 0.0   Absolute Nucleated RBC Unknown 0.0   Sed Rate Latest Ref Range: 0 - 20 mm/h 16   INR Latest Ref Range: 0.86 - 1.14  1.78 (H)       Imaging     3/2019 ECHO  Interpretation Summary  Borderline (EF 50-55%) reduced left ventricular function is present.  Right ventricular systolic dysfunction appears mild.  No significant valve dysfunction.  No vegetation detected.    2018 ECHO  Interpretation Summary  Technically difficult study. The patient's rhythm is atrial fibrillation.  Global and regional left ventricular function is normal with an EF of 55-60%.  Global right ventricular function is mildly reduced.  No significant valvular abnormalities were noted.  Dilation of the inferior vena cava is present with normal respiratory  variation in diameter.Estimated mean right atrial pressure is 8 mmHg.  No pericardial effusion is present.    E/E' av.4  Lateral E/e': 10.8  Medial E/e': 14.0        Clinical history: 74 year old man with AF, recovered tachycardia mediated cardiomyopathy and dilated aortic  root on TTE  4.2 cm. MRA for further evaluation.      Comparison MRA: None.      1. The aortic root is mildly dilated, measuring 3.9 cm at the level of sinus of Valsalva.     2. The ascending aorta measures 3.9 cm. The transverse arch and descending thoracic aorta are normal in  size without an aneurysm or dissection.     2. The aortic arch is left sided. The brachiocaphalic and left common carotid arteries both have common  origin. There is no coarctation.     3. The main and proximal branch pulmonary arteries are normal in size.      4. The systemic venous connections are normal.      6/4/2018 RHC and Coronary Angiogram        Assessment/Plan   He is doing better at this visit than in the past. He appears euvolemic. His depression is under better control. We will not make any medication changes today. His lisinopril was discontinued due to pre-syncope at prior visits, we could consider adding back cautiously in the future, but we held off for today, especially as is EF is now normal.     # Chronic borderline diastolic heart failure/HFpEF with improved EF  Stage C. NYHA Class III- although confounded by body habitus and comorbidities   Fluid status:  Euvolemic today, continue current torsemide  ACEi/ARB/ARNI: Will need to reassess at visit in 3 months (see above)  BB: Toprol 100 mg daily  Aldosterone antagonist: spironolactone  50 mg daily (despite normal EF now, some benefit shown in TOPCAT trial)  SCD prophylaxis: n/a EF>40%  Ischemic evaluation: negative coronary angiogram 6/2018  Remote monitoring: uses [employer]      # Afib  -continue the Toprol 100 mg daily     # HTN  -controlled on HF therapies     # Atrial fibrillation ?permanant, hx of ablation per patient   [id number]   -he is on warfarin and Toprol     # THONG/alveolar hypoventilation  -encouraged consistent CPAP use       Follow-up in January with Dr. Willoughby or CORE      I saw patient in shared visit format and confirmed history and physical and jointly formulated  plan to patient.        CC  Matthias Sanchez  [facility]  Dilia Greene, Ph.D. [employer]    Please do not hesitate to contact me if you have any questions/concerns.     Sincerely,     Christian Willoughby MD

## 2019-11-08 NOTE — PROGRESS NOTES
Infectious Disease Clinic Staff Note: Mr. Moreira was seen, examined, and the case was discussed with Dr. Bergeron, ID Fellow -- I agree with her interval history and examination, assessment and plan in this outpatient ID Progress note. This note reflects my observations and opinions and the plan outlined fully reflects my approach.  I have reviewed the available history, radiology, laboratory results, and reports with the Fellow.

## 2019-11-11 ENCOUNTER — DOCUMENTATION ONLY (OUTPATIENT)
Dept: FAMILY MEDICINE | Facility: CLINIC | Age: 69
End: 2019-11-11

## 2019-11-11 NOTE — PROGRESS NOTES
"When opening a documentation only encounter, be sure to enter in \"Chief Complaint\" Forms and in \" Comments\" Title of form, description if needed.    Clarke is a 69 year old  male  Form received via: Fax  Form now resides in: Provider Ready    Yajaira Longoria CMA       Form has been completed by provider.     Form sent out via: Fax to [facility] at Fax Number: [phone]  Patient informed:  NA  Output date: November 11, 2019    Yajaira Longoria CMA      **Please close the encounter**                      "

## 2019-11-12 ENCOUNTER — TELEPHONE (OUTPATIENT)
Dept: FAMILY MEDICINE | Facility: CLINIC | Age: 69
End: 2019-11-12

## 2019-11-12 NOTE — TELEPHONE ENCOUNTER
[facility] Family Medicine phone call message - order or referral request from patient:     Order or referral being requested: Requested order INR draw     Additional Details: Home care nurse called requesting verbal okay to draw the patient's INR.    Referral only -Specialty  Location     OK to leave a message on voice mail? Yes    Primary language: English      needed? No    Call taken on November 12, 2019 at 10:49 AM by Amanda Dumont    Order request route to [facility] TRIAGE   Referrals Route to [facility] (Green/[facility]/Purple) CARE COORDINATOR

## 2019-12-02 ENCOUNTER — DOCUMENTATION ONLY (OUTPATIENT)
Dept: FAMILY MEDICINE | Facility: CLINIC | Age: 69
End: 2019-12-02

## 2019-12-02 NOTE — PROGRESS NOTES
"Form has been completed by provider.     Form sent out via: Fax to [phone]  Patient informed:  na  Output date: December 2, 2019    Yajaira Longoria CMA      **Please close the encounter**    When opening a documentation only encounter, be sure to enter in \"Chief Complaint\" Forms and in \" Comments\" Title of form, description if needed.    Clarke is a 69 year old  male  Form received via: Fax  Form now resides in: Provider Ready    Yajaira Longoria CMA                      "

## 2019-12-12 DIAGNOSIS — L85.3 DRY SKIN: ICD-10-CM

## 2019-12-12 DIAGNOSIS — L28.0 LICHEN OF SKIN: ICD-10-CM

## 2019-12-13 RX ORDER — AMMONIUM LACTATE 12 G/100G
CREAM TOPICAL 2 TIMES DAILY PRN
Qty: 385 G | Refills: 0 | Status: SHIPPED | OUTPATIENT
Start: 2019-12-13 | End: 2020-03-31

## 2019-12-23 ENCOUNTER — TELEPHONE (OUTPATIENT)
Dept: PSYCHOLOGY | Facility: CLINIC | Age: 69
End: 2019-12-23

## 2019-12-23 DIAGNOSIS — I50.22 CHRONIC SYSTOLIC CONGESTIVE HEART FAILURE (H): Primary | ICD-10-CM

## 2019-12-23 NOTE — TELEPHONE ENCOUNTER
Hospital bed is broken, can't find a way to get it fixed, not sleeping, not getting out of house.  Sitaution of bed being broken is triggering some childhood memories which are making Clarke feel somewhat paralyzed.  Has taken a couple of steps to move this situation forward now.  Asked him to let me know if there is a way we can assist.  He was thankful for the offer, but states he will try to do it on his own right now.    Trying to stay present in emotional moments as we talked about at last session and it's been helpful    Concerned that he has cancelled some of our appts.  Reassured him about the cancellations. Discussed perhaps scheduling 1 or 2 appts at a time, rather than a whole series as he finds himself in a guilt/shame cycle where he cancels one and then feels shame and then does not come to the next one.    Discussed options in terms of scheduling them in conjunction with Dr. Sanchez visits in the future if that would be helpful.    Clarke will call the clinic to schedule another appt with this writer in the next couple of weeks.    Clarke was very appreciative of the phone call.  We will touch base on 1/2 or 1/3.

## 2020-01-03 NOTE — PROGRESS NOTES
Chief Complaint   Patient presents with     Follow Up     Diabetic foot care.           No Known Allergies      Subjective: Clarke is a 69 year old male who presents to the clinic today for a follow up of BL foot ulcerations. He was last seen by me 10/4/19. He had an ABO performed in November. Relates that he stopped the abx in November. Relates that the toe has not gotten more red than it previously was. No pain to the foot. He does cover both big toes with bandaids. Does not think that any wound are open on his feet.     Objective  Data Unavailable Data Unavailable Data Unavailable Data Unavailable Data Unavailable 0 lbs 0 oz  Hyperkeratotic lesions noted to the distal right hallux, right 2nd, and right 3rd digits. These were sharply debrided and no open lesions were noted underneath. Onychomycosis x 10.   DP and PT pulses 1/4 BL. CRT 1 second. Absent pedal hair.  Gross and protective sensations are intact BL.  Hammertoes to all lesser digits. Hallux malleus BL. Equinus BL. Pes planus.     VICK IMPRESSION:  1. RIGHT VICK is normal, 1.17. TBI unable to be obtained. Mildly  abnormal 1st digit PPG.  2. LEFT VICK is normal, 1.12. TBI is normal, 0.81.     Guidelines:     VICK Diagnostic Criteria (Based on criteria published in Circulation  2011; 124: 4198-9080):    > 1.4: Non compressible    1.00 - 1.40: Normal    0.91 - 0.99: Borderline    At or below 0.90: Abnormal     VICK Diagnostic Criteria (Based on ACC/AHA guideline 2008):    >/=1.3 - non compressible vessels    1.00  -1.29 - Normal    0.91 - 0.99 - Borderline    0.41 - 0.90 - Mild to moderate PAD    0.00 - 0.40 - Severe PAD     KYLE QUIROZ MD    Assessment: Clarke is a 70 YO male with DM2 and neuropathy.  Has had ulceration of the tips of the left hallux and right 2nd digit. Hallux was down to bone. Initial plan was for amputation of the toe. He opted to have the toe treated conservatively with abx. This appears to be effective in curing his osteomyelitis.    Onychomycosis  Tyloma.     Plan:   - Pt seen and evaluated  - Tylomas debrided x 3.  - Nails debrided x 10.      - Pt to return to clinic in 9 weeks.

## 2020-01-06 ENCOUNTER — OFFICE VISIT (OUTPATIENT)
Dept: ORTHOPEDICS | Facility: CLINIC | Age: 70
End: 2020-01-06
Payer: COMMERCIAL

## 2020-01-06 DIAGNOSIS — L84 TYLOMA: ICD-10-CM

## 2020-01-06 DIAGNOSIS — B35.1 OM (ONYCHOMYCOSIS): Primary | ICD-10-CM

## 2020-01-06 DIAGNOSIS — E11.42 TYPE 2 DIABETES MELLITUS WITH DIABETIC POLYNEUROPATHY, UNSPECIFIED WHETHER LONG TERM INSULIN USE (H): ICD-10-CM

## 2020-01-06 ASSESSMENT — PATIENT HEALTH QUESTIONNAIRE - PHQ9: SUM OF ALL RESPONSES TO PHQ QUESTIONS 1-9: 21

## 2020-01-06 NOTE — LETTER
1/6/2020       RE: Clarke Moreira  [address]  [place] [facility] [address]     Dear Colleague,    Thank you for referring your patient, Clarke Moreira, to the [facility] ORTHOPAEDIC [facility]IC at [facility]. Please see a copy of my visit note below.    Chief Complaint   Patient presents with     Follow Up     Diabetic foot care.         No Known Allergies      Subjective: Clarke is a 69 year old male who presents to the clinic today for a follow up of BL foot ulcerations. He was last seen by me 10/4/19. He had an ABO performed in November. Relates that he stopped the abx in November. Relates that the toe has not gotten more red than it previously was. No pain to the foot. He does cover both big toes with bandaids. Does not think that any wound are open on his feet.     Objective  Data Unavailable Data Unavailable Data Unavailable Data Unavailable Data Unavailable 0 lbs 0 oz  Hyperkeratotic lesions noted to the distal right hallux, right 2nd, and right 3rd digits. These were sharply debrided and no open lesions were noted underneath. Onychomycosis x 10.   DP and PT pulses 1/4 BL. CRT 1 second. Absent pedal hair.  Gross and protective sensations are intact BL.  Hammertoes to all lesser digits. Hallux malleus BL. Equinus BL. Pes planus.     VICK IMPRESSION:  1. RIGHT VICK is normal, 1.17. TBI unable to be obtained. Mildly  abnormal 1st digit PPG.  2. LEFT VICK is normal, 1.12. TBI is normal, 0.81.     Guidelines:     VICK Diagnostic Criteria (Based on criteria published in Circulation  2011; 124: 4698-9894):    > 1.4: Non compressible    1.00 - 1.40: Normal    0.91 - 0.99: Borderline    At or below 0.90: Abnormal     VICK Diagnostic Criteria (Based on ACC/AHA guideline 2008):    >/=1.3 - non compressible vessels    1.00  -1.29 - Normal    0.91 - 0.99 - Borderline    0.41 - 0.90 - Mild to moderate PAD    0.00 - 0.40 - Severe PAD     KYLE QUIROZ MD    Assessment: Clarke is a  68 YO male with DM2 and neuropathy.  Has had ulceration of the tips of the left hallux and right 2nd digit. Hallux was down to bone. Initial plan was for amputation of the toe. He opted to have the toe treated conservatively with abx. This appears to be effective in curing his osteomyelitis.   Onychomycosis  Tyloma.     Plan:   - Pt seen and evaluated  - Tylomas debrided x 3.  - Nails debrided x 10.      - Pt to return to clinic in 9 weeks.     Again, thank you for allowing me to participate in the care of your patient.      Sincerely,    Jesús Lagos DPM

## 2020-01-06 NOTE — NURSING NOTE
Reason For Visit:   Chief Complaint   Patient presents with     Follow Up     Diabetic foot care.        Pain Assessment  Patient Currently in Pain: Denies        No Known Allergies        Whitley Munoz LPN

## 2020-01-07 ENCOUNTER — PATIENT OUTREACH (OUTPATIENT)
Dept: CARDIOLOGY | Facility: CLINIC | Age: 70
End: 2020-01-07

## 2020-01-07 NOTE — PROGRESS NOTES
Called Clarke to follow up on his canceling his CORE appointment today.  Left message asking for call back and letting him know that I would schedule a lab appointment prior to his CORE appt in February.    Ida Reese RN

## 2020-01-14 DIAGNOSIS — E78.5 HYPERLIPIDEMIA LDL GOAL <100: ICD-10-CM

## 2020-01-14 RX ORDER — OMEGA-3 FATTY ACIDS/FISH OIL 300-1000MG
1 CAPSULE ORAL DAILY
Qty: 30 CAPSULE | Refills: 11 | Status: SHIPPED | OUTPATIENT
Start: 2020-01-14 | End: 2020-12-29

## 2020-01-14 NOTE — TELEPHONE ENCOUNTER
"Request for medication refill: omega 3 1000 MG CAPS    Providers if patient needs an appointment and you are willing to give a one month supply please refill for one month and  send a letter/MyChart using \".SMILLIMITEDREFILL\" .smillimited and route chart to \"P SMI \" (Giving one month refill in non controlled medications is strongly recommended before denial)    If refill has been denied, meaning absolutely no refills without visit, please complete the smart phrase \".smirxrefuse\" and route it to the \"P SMI MED REFILLS\"  pool to inform the patient and the pharmacy.    Yamila Huber CMA        "

## 2020-02-13 DIAGNOSIS — K59.00 CONSTIPATION, UNSPECIFIED CONSTIPATION TYPE: ICD-10-CM

## 2020-02-13 RX ORDER — AMOXICILLIN 250 MG
1-2 CAPSULE ORAL 2 TIMES DAILY PRN
Qty: 60 TABLET | Refills: 3 | Status: SHIPPED | OUTPATIENT
Start: 2020-02-13 | End: 2020-06-08

## 2020-02-13 NOTE — TELEPHONE ENCOUNTER
"Request for medication refill: senna-docusate (SENOKOT-S/PERICOLACE) 8.6-50 MG tablet    Providers if patient needs an appointment and you are willing to give a one month supply please refill for one month and  send a letter/MyChart using \".SMILLIMITEDREFILL\" .smillimited and route chart to \"P SMI \" (Giving one month refill in non controlled medications is strongly recommended before denial)    If refill has been denied, meaning absolutely no refills without visit, please complete the smart phrase \".smirxrefuse\" and route it to the \"P SMI MED REFILLS\"  pool to inform the patient and the pharmacy.    Kourtney Moreno MA        "

## 2020-02-20 NOTE — PLAN OF CARE
Problem: Goal Outcome Summary  Goal: Goal Outcome Summary  Outcome: No Change  Pt alert and oriented. Able to communicate needs. Denies pain. Denies SOB. lymphedema wrap on BLE are intact and dry. Wraps are due to be open and re-wrap today. Pt declined x 2 to remove wraps this shift. Pt yelled at this writer but later apologizing upon asking pt to remove wrap. Participated in PT/OT. Stand by assist for mobility and transfer. Potassium= 4.2 and magnesium=1.8. No need to replaced per protocol.  Continue to monitor.        Dr. Beltrán [id number]

## 2020-02-27 ENCOUNTER — TELEPHONE (OUTPATIENT)
Dept: CARDIOLOGY | Facility: CLINIC | Age: 70
End: 2020-02-27

## 2020-03-02 ENCOUNTER — TELEPHONE (OUTPATIENT)
Dept: CARDIOLOGY | Facility: CLINIC | Age: 70
End: 2020-03-02

## 2020-03-02 NOTE — TELEPHONE ENCOUNTER
Spoke to Clarke. Time for pick-up is set for Friday 3/6/2020. Patient will set box in foyer at 8:00 am.     Juan Pablo Malin [facility]   CORE/Heart Failure   Advanced Heart Failure Referral Coordinator  [facility]

## 2020-03-06 ENCOUNTER — TELEPHONE (OUTPATIENT)
Dept: CARDIOLOGY | Facility: CLINIC | Age: 70
End: 2020-03-06

## 2020-03-06 NOTE — TELEPHONE ENCOUNTER
Pt returned call and confirmed cardiocom scale went to the  area at 7:15 this morning. Laura Phan RN CORE Care Coordinator

## 2020-03-06 NOTE — TELEPHONE ENCOUNTER
Called pt to confirm pt put out cardiocom scale for . Reached HARIKA, RYAN requesting a return call. Laura Phan RN CORE Care Coordinator

## 2020-03-12 ENCOUNTER — TELEPHONE (OUTPATIENT)
Dept: FAMILY MEDICINE | Facility: CLINIC | Age: 70
End: 2020-03-12

## 2020-03-12 DIAGNOSIS — G47.33 OSA (OBSTRUCTIVE SLEEP APNEA): Primary | ICD-10-CM

## 2020-03-12 NOTE — TELEPHONE ENCOUNTER
[facility] Family Medicine phone call message - order or referral request for patient:     Order or referral being requested: All CPAP supplies      Additional Comments:  [address]  [phone]     OK to leave a message on voice mail? Yes    Primary language: English      needed? No    Call taken on March 12, 2020 at 8:41 AM by Bisi Sahu CMA

## 2020-03-13 ENCOUNTER — OFFICE VISIT (OUTPATIENT)
Dept: ORTHOPEDICS | Facility: CLINIC | Age: 70
End: 2020-03-13
Payer: COMMERCIAL

## 2020-03-13 DIAGNOSIS — E11.49 TYPE II OR UNSPECIFIED TYPE DIABETES MELLITUS WITH NEUROLOGICAL MANIFESTATIONS, NOT STATED AS UNCONTROLLED(250.60) (H): Primary | ICD-10-CM

## 2020-03-13 DIAGNOSIS — L84 TYLOMA: ICD-10-CM

## 2020-03-13 DIAGNOSIS — B35.1 OM (ONYCHOMYCOSIS): ICD-10-CM

## 2020-03-13 NOTE — NURSING NOTE
Reason For Visit:   Chief Complaint   Patient presents with     RECHECK     9 week follow up foot care right great toe has split open and has been bleeding       There were no vitals taken for this visit.    Pain Assessment  Patient Currently in Pain: Lauren Calloway, ATC

## 2020-03-13 NOTE — PROGRESS NOTES
Chief Complaint:   Chief Complaint   Patient presents with     RECHECK     9 week follow up foot care        No Known Allergies      Subjective: Clarke is a 69 year old male who presents to the clinic today for a diabetic foot exam and management. Relates That he kept the compression sock on the legs for about a week without changing them. Noticed a sloshing around the riht big toe. Took tem off and had bleeding and smell from the toe. Relates this was about 2 weeks ago. He has been covering the toe with a Mepilex.     Objective  Lab Results   Component Value Date    A1C 6.1 02/26/2019    A1C 5.7 08/28/2018    A1C 6.3 03/27/2018    A1C 6.3 11/27/2017    A1C 6.5 08/14/2017         Hyperkeratotic lesions noted to the distal left 3rd digit. This was sharply debrided and no open lesions were noted underneath. Onychomycosis x 10.   DP and PT pulses 1/4 BL. CRT 1 second. Absent pedal hair.  Gross and protective sensations are intact BL.  Hammertoes to all lesser digits. Hallux malleus BL. Equinus BL. Pes planus. tylo      Right hallux nail was completely macerated and lytic. This was sharply removed. No deep extension into the eponychium. Odor decreased with cleansing No s/s of infection noted.           Assessment: Clarke is a 68 YO male with DM2 and neuropathy.  Ulcerations are all healed.  Onychomycosis  Tyloma.      Plan:   - Pt seen and evaluated  - Tylomas debrided x 2.  - Nails debrided x 10.      - Pt to return to clinic in 9 weeks.

## 2020-03-13 NOTE — LETTER
3/13/2020       RE: Clarke Moreira  [address]  [place] [facility] [address]     Dear Colleague,    Thank you for referring your patient, Clarke Moreira, to the [facility] ORTHOPAEDIC CLINIC at [facility]. Please see a copy of my visit note below.    Chief Complaint:   Chief Complaint   Patient presents with     RECHECK     9 week follow up foot care        No Known Allergies      Subjective: Clarke is a 69 year old male who presents to the clinic today for a diabetic foot exam and management. Relates That he kept the compression sock on the legs for about a week without changing them. Noticed a sloshing around the riht big toe. Took tem off and had bleeding and smell from the toe. Relates this was about 2 weeks ago. He has been covering the toe with a Mepilex.     Objective  Lab Results   Component Value Date    A1C 6.1 02/26/2019    A1C 5.7 08/28/2018    A1C 6.3 03/27/2018    A1C 6.3 11/27/2017    A1C 6.5 08/14/2017         Hyperkeratotic lesions noted to the distal left 3rd digit. This was sharply debrided and no open lesions were noted underneath. Onychomycosis x 10.   DP and PT pulses 1/4 BL. CRT 1 second. Absent pedal hair.  Gross and protective sensations are intact BL.  Hammertoes to all lesser digits. Hallux malleus BL. Equinus BL. Pes planus. tylo      Right hallux nail was completely macerated and lytic. This was sharply removed. No deep extension into the eponychium. Odor decreased with cleansing No s/s of infection noted.           Assessment: Clarke is a 70 YO male with DM2 and neuropathy.  Ulcerations are all healed.  Onychomycosis  Tyloma.      Plan:   - Pt seen and evaluated  - Tylomas debrided x 2.  - Nails debrided x 10.      - Pt to return to clinic in 9 weeks.       Again, thank you for allowing me to participate in the care of your patient.      Sincerely,    Jesús Lagos DPM

## 2020-03-16 NOTE — TELEPHONE ENCOUNTER
"  DME (Durable Medical Equipment) Orders and Documentation  No orders of the defined types were placed in this encounter.  I've placed the orders twice  Not sure why I'm getting this message of \"no orders\"  Epic saying I've placed duplicate orders       The patient was assessed and it was determined the patient is in need of the following listed DME Supplies/Equipment. Please complete supporting documentation below to demonstrate medical necessity.      DME All Other Item(s) Documentation    List reason for need and supporting documentation for medical necessity below for each DME item.     1. Worn out CPAP supplies. Needs them still. Obesity, heart failure.    PHarper            "

## 2020-03-17 NOTE — TELEPHONE ENCOUNTER
3/17/20 Printed CPAP order and faxed to [phone], per request.    Shaila Pettit  Care Coordinator

## 2020-03-23 ENCOUNTER — TELEPHONE (OUTPATIENT)
Dept: PSYCHOLOGY | Facility: CLINIC | Age: 70
End: 2020-03-23

## 2020-03-23 NOTE — TELEPHONE ENCOUNTER
Called Clarke to check in as I have not seen in him in sometime.  He is feeling very down and depressed right now.  No plan or intent to harm himself.  Has been doing OK, but feeling a bit overwhelmed with all that is happening.  Offered a phone visit to him next week and he was very grateful for this.  Checked in if he was still able to have his friend delivering groceries. She has been out of state and he was starting to think about what his options might be.  He is going to contact his apartment management to see if there is anything they are able to do to help.  One of our  staff will call Clarke to get a phone visit scheduled when I get my schedule back in the system for next week.

## 2020-03-30 DIAGNOSIS — L28.0 LICHEN OF SKIN: ICD-10-CM

## 2020-03-30 DIAGNOSIS — L85.3 DRY SKIN: ICD-10-CM

## 2020-03-31 RX ORDER — AMMONIUM LACTATE 12 G/100G
CREAM TOPICAL 2 TIMES DAILY PRN
Qty: 385 G | Refills: 11 | Status: SHIPPED | OUTPATIENT
Start: 2020-03-31 | End: 2021-08-30

## 2020-04-07 NOTE — TELEPHONE ENCOUNTER
Devices have been confirmed returned.     Juan Pablo Malin CMA   CORE/Heart Failure   Advanced Heart Failure Referral Coordinator  [facility]

## 2020-04-09 DIAGNOSIS — E03.9 HYPOTHYROIDISM, UNSPECIFIED TYPE: ICD-10-CM

## 2020-04-09 NOTE — TELEPHONE ENCOUNTER

## 2020-04-11 RX ORDER — LEVOTHYROXINE SODIUM 175 UG/1
175 TABLET ORAL
Qty: 90 TABLET | Refills: 3 | Status: SHIPPED | OUTPATIENT
Start: 2020-04-11 | End: 2021-03-29

## 2020-04-17 ENCOUNTER — TELEPHONE (OUTPATIENT)
Dept: PHARMACY | Facility: PHYSICIAN GROUP | Age: 70
End: 2020-04-17

## 2020-04-17 NOTE — TELEPHONE ENCOUNTER
Called for anticoagulation reminder and follow up.    No answer.  Left VM to call back.    Lidia MedranoD.

## 2020-05-06 ENCOUNTER — TELEPHONE (OUTPATIENT)
Dept: FAMILY MEDICINE | Facility: CLINIC | Age: 70
End: 2020-05-06

## 2020-05-06 NOTE — TELEPHONE ENCOUNTER
[email or web address]    Called him back with info about this service above.

## 2020-05-06 NOTE — TELEPHONE ENCOUNTER
Clarke called to talk to the  to find out if there are any options for grocery delivery.  He reports feeling more hopeless than usual and having increased suicidal thoughts.  No plan or intent to harm himself, just has noticed those thoughts creeping in more frequently than they already do.  Was willing to schedule a phone visit for 5/8 at 9:30, offered him a visit tomorrow, but he declined.

## 2020-05-08 ENCOUNTER — VIRTUAL VISIT (OUTPATIENT)
Dept: PSYCHOLOGY | Facility: CLINIC | Age: 70
End: 2020-05-08
Payer: COMMERCIAL

## 2020-05-08 DIAGNOSIS — F33.1 MODERATE EPISODE OF RECURRENT MAJOR DEPRESSIVE DISORDER (H): Primary | ICD-10-CM

## 2020-05-08 DIAGNOSIS — F41.1 GAD (GENERALIZED ANXIETY DISORDER): ICD-10-CM

## 2020-05-08 NOTE — PROGRESS NOTES
"Behavioral Health Progress Note    Client Legal Name: Clarke Moreira   Client Preferred Name: Clarke   Service Type: Individual  Length of Visit: 48 minutes, connected at 9:20-10:08  Attendees: patient     The following statement was read to the patient at the outset of this visit:     \"We have found that certain health care needs can be provided without the need for a face to face visit.  This service lets us provide the care you need with a phone conversation. I will have full access to your [facility] medical record during this entire phone call.   I will be taking notes for your medical record.  Since this is like an office visit, we will bill your insurance company for this service. There are potential benefits and risks of telephone visits (e.g. limits to patient confidentiality) that differ from in-person visits.?  Confidentiality still applies for telephone services, and nobody will record the visit.  It is important to be in a quiet, private space that is free of distractions (including cell phone or other devices) during the visit.??If during the course of the call I believe a telephone visit is not appropriate, you will not be charged for this service.\"     Consent has been obtained for this service by care team member: Yes     Emergency contact: Janice Dubon Emergency Room: [place]  Location at time of call: home    Start of call: 9:20  End of call: 10:08      Identifying Information and Presenting Problem:    The patient is a 69 year old American male who I last saw in clinic in October 2019.  We have had a few phone conversations since then.  I contacted him recently to check-in and we scheduled an appointment to follow up on ongoing symptoms of depression, adjustment to medical illness, and social isolation.      Treatment Objective(s) Addressed in This Session:               Clarke will practice a healthy daily discipline of diet and exercise.    Clarke will make and keep " Telephone Encounter by Thao Rosales at 01/09/17 08:10 AM     Author:  Thao Rosales Service:  (none) Author Type:       Filed:  01/09/17 08:10 AM Encounter Date:  12/13/2016 Status:  Signed     :  Thao Rosales ()            Pt sched Carotid on 8:20AM HLD then HRT same day 9:45AM HLD.[KD1.1M] Patient was given instructions and was told prep & location.[KD1.1T]      Revision History        User Key Date/Time User Provider Type Action    > KD1.1 01/09/17 08:10 AM Thao Rosales  Sign    M - Manual, T - Template             appointments with appropriate time limits to reduce disabling anxiety about leaving the                house.     Progress on / Status of Treatment Objective(s) / Homework:  worsening    PHQ-9 SCORE 8/27/2019 9/3/2019 1/6/2020   PHQ-9 Total Score - - -   PHQ-9 Total Score 23 25 21       CHRIS-7 SCORE 12/11/2018 5/10/2019 9/3/2019   Total Score - - -   Total Score 20 13 19     Topics Discussed/Interventions Provided:    Clarke states that his general misery has increased since last Fall.  He started to feel like doing in person activities were more and more intimidating, so he was avoiding leaving the house even more than usual.  He didn't schedule with me and he has been missing scheduled medical appointments as well.  His toe was getting better, but now the sore is not healing again.  At this point, he states he has made some appointments to follow up about some of these medical concerns, but he feels a profound sense of hopelessness about doing so.  lCarke states he finds himself dwelling more on his suicidal thoughts.  The thoughts are there very frequently.  He does not identify a plan or intent to harm himself.  He states he is too chicken to do anything, but he reports that he would be OK if something happened healthwise and he no longer had to live with the self-doubt and anxiety that he experiences every day.  He identifies his current purpose to be living as comfortably as he can until he can rest.      Clarke feels like the phone visits with physicians have been helpful.  He typically is so worried and concerned about if he acting like a good patient and not intruding on anyone when he is in person.  When he is on the phone, he has been feeling more able to articulate what he wants to say and ask the questions that he wants to ask.  He does not feel as intimidated.  He has noticed that he worries less about pleasing his providers when he is on the phone and more about stating his own needs.        Assessment: The patient sounded engaged in the conversation today via phone.      Mental Status: Clarke sounded alert and oriented.  Speech was normal rate and rhythm. Mood was described as  Getting worse.  He was a bit more irritable with me on the phone initially, but this seemed to decrease as the conversation moved forward.   Thought processes were logical and coherent.  Thought content was WNL with no evidence of psychotic or paranoid features.  Reports suicidal thoughts, but is denying any plan or any intent to do anything to harm himself.  Evidence of future oriented behavior in terms of trying to find a service to deliver groceries and upcoming appts he plans to attend. . Memory appeared grossly intact. Insight and judgment appeared good and patient exhibited good impulse control during the appointment.     Does the patient appear to be at imminent risk of harm to self/others at this time? No    The session was necessary to reconnect and assess current status after a 6 month absence from therapy.  Symptoms of depression and anxiety have continued to interfere with his ability to function at home and cause distress that can interfere with his ability to care for himself.  Ongoing psychotherapy is necessary to provide counseling and provide support.    Diagnosis (DSM-5):  Major Depression, recurrent, moderate  Generalized Anxiety Disorder  R/o persistent depressive disorder    Plan:  1. Return 5/20 at 9:30 via phone  2. Continue to follow with Dr. Sanchez for medications  3. Need to update dx assessment at a future appt.     NOTE: Treatment plan updated needed on 8/12/20.  Diagnostic assessment update due 10/15/19.

## 2020-05-11 DIAGNOSIS — E11.9 TYPE 2 DIABETES MELLITUS WITHOUT COMPLICATION, WITHOUT LONG-TERM CURRENT USE OF INSULIN (H): ICD-10-CM

## 2020-05-11 DIAGNOSIS — I48.19 PERSISTENT ATRIAL FIBRILLATION (H): ICD-10-CM

## 2020-05-11 RX ORDER — ATORVASTATIN CALCIUM 40 MG/1
40 TABLET, FILM COATED ORAL DAILY
Qty: 30 TABLET | Refills: 11 | Status: SHIPPED | OUTPATIENT
Start: 2020-05-11 | End: 2021-04-27

## 2020-05-11 RX ORDER — WARFARIN SODIUM 5 MG/1
TABLET ORAL
Qty: 75 TABLET | Refills: 11 | Status: SHIPPED | OUTPATIENT
Start: 2020-05-11 | End: 2020-11-17

## 2020-05-12 ENCOUNTER — VIRTUAL VISIT (OUTPATIENT)
Dept: CARDIOLOGY | Facility: CLINIC | Age: 70
End: 2020-05-12
Attending: PHYSICIAN ASSISTANT
Payer: COMMERCIAL

## 2020-05-12 DIAGNOSIS — I50.32 CHRONIC HEART FAILURE WITH PRESERVED EJECTION FRACTION (H): ICD-10-CM

## 2020-05-12 PROCEDURE — 99214 OFFICE O/P EST MOD 30 MIN: CPT | Mod: 95 | Performed by: NURSE PRACTITIONER

## 2020-05-12 RX ORDER — SENNOSIDES 8.6 MG
1 TABLET ORAL 2 TIMES DAILY
COMMUNITY
Start: 2020-05-12 | End: 2020-05-12

## 2020-05-12 RX ORDER — TORSEMIDE 100 MG/1
150 TABLET ORAL DAILY
Qty: 30 TABLET | Refills: 11 | COMMUNITY
Start: 2020-05-12 | End: 2020-06-08

## 2020-05-12 ASSESSMENT — PAIN SCALES - GENERAL: PAINLEVEL: NO PAIN (0)

## 2020-05-12 NOTE — LETTER
"5/12/2020      RE: Clarke Moreira  [address]  [place] [facility] [place] [id number]       Dear Colleague,    Thank you for the opportunity to participate in the care of your patient, Clarke Moreira, at the [facility]. Please see a copy of my visit note below.    Advanced Heart Failure Clinic -- CORE Follow Up -- Telephone Encounter  May 12, 2020    HPI:   Mr. Clarke Moreira is a 69yr old male with a history of HFpEF (LVEF 50-55% per TTE 3/2019), AFib (s/p ablation 2008), morbid obesity, THONG/alveolar hypoventilation (uses CPAP), HTN, dyslipidemia, DMII (c/b neuropathy), depression, anxiety, BPH, and PAD who is being evaluated via telephone encounter for CORE follow up.    Since his last visit, he states that he feels well overall.  He had some \"emotional issues last fall,\" which is why he missed his scheduled appointment.  He has had ongoing problems with neuropathy in his feet, and has had a chronic wound in his right foot since 2018.  He has been treated for osteomyelitis, but has now developed open wounds along the nail/cuticle line that have not healed completely.  His left toes have developed \"hammer toe\" and his entire foot \"has developed into a hermelindo.\"  He is not able to bear weight on his left foot, and the wounds on his right foot have impaired his ability to walk.  He continues to follow with podiatry.  He is not active.  He is \"wobbly\" when on his feet, and denies any falls.  He is unable to stand or walk without any support.  His breathing has been stable, and he denies SOB, PND, and orthopnea.  He uses CPAP at night, and states that he sleeps in a hospital bed at ~20  elevation.  His palpitations have improved since his last hospitalization.  He has not been weighing himself, as it is difficult for him to get up.  He does not feel any CHF symptoms, and denies fluid retention, bloating, and MOISÉS.  He has steady, consistent UOP with " torsemide.  His appetite has increased, but he is eating less due to a decreased supply of food.  His diet has not been as well-balanced as his usual, as he has not been able to get access to his usual foods.  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  He is eating regularly, and reports steady protein intake, but is lacking vegetables and fruit.  He denies nausea, vomiting, diarrhea, and constipation.  He is not checking his BPs.  He otherwise denies chest pain, dizziness, headaches, acute vision changes, fevers, chills, cough, sore throat, and signs of bleeding.      PAST MEDICAL HISTORY:  Past Medical History:   Diagnosis Date     Atrial fibrillation (H)      Basal cell carcinoma      Chronic anticoagulation      Diastolic heart failure (H)      Dyslipidemia      Essential hypertension      Morbid obesity (H)      Sleep-disordered breathing      Type 2 diabetes mellitus (H)        FAMILY HISTORY:  Family History   Problem Relation Age of Onset     Depression Mother      Glaucoma Maternal Grandfather      Cancer No family hx of         No family history of skin cancer     Macular Degeneration No family hx of        SOCIAL HISTORY:  Social History     Socioeconomic History     Marital status: Single     Spouse name: None     Number of children: None     Years of education: None     Highest education level: None   Occupational History     None   Social Needs     Financial resource strain: None     Food insecurity     Worry: None     Inability: None     Transportation needs     Medical: None     Non-medical: None   Tobacco Use     Smoking status: Never Smoker     Smokeless tobacco: Never Used   Substance and Sexual Activity     Alcohol use: No     Comment: Former alcohol abuse. Quit 70s then quit later in 80s-present     Drug use: No     Comment: history of LSD use     Sexual activity: None   Lifestyle     Physical activity     Days per week: None      Minutes per session: None     Stress: None   Relationships     Social connections     Talks on phone: None     Gets together: None     Attends Holiness service: None     Active member of club or organization: None     Attends meetings of clubs or organizations: None     Relationship status: None     Intimate partner violence     Fear of current or ex partner: None     Emotionally abused: None     Physically abused: None     Forced sexual activity: None   Other Topics Concern     Parent/sibling w/ CABG, MI or angioplasty before 65F 55M? Not Asked   Social History Narrative    Lives in an apartment in 16th floor near hospital.  Has elevators, unable to use stairs. Not able to shop; has things delivered to him including food. Difficulty completing cleaning. Goes to PCP once yearly for annual check up and medication review.       CURRENT MEDICATIONS:  Current Outpatient Medications   Medication Sig Dispense Refill     ammonium lactate (LAC-HYDRIN) 12 % external cream Apply topically 2 times daily as needed for dry skin 385 g 11     Ascorbic Acid (VITAMIN C) POWD Take 500 mcg by mouth daily  0     aspirin (ASA) 81 MG tablet Take 1 tablet (81 mg) by mouth daily 30 tablet 0     atorvastatin (LIPITOR) 40 MG tablet Take 1 tablet (40 mg) by mouth daily 30 tablet 11     bacitracin 500 UNIT/GM external ointment Apply 30 g topically 2 times daily 30 g 1     blood glucose (ONETOUCH ULTRA) test strip Use to test blood sugars 2 times daily or as directed. 100 strip 11     blood glucose monitoring (ONE TOUCH DELICA) lancets Use to test blood sugars 2 times daily or as directed. 100 each 11     blood glucose monitoring (ONE TOUCH ULTRA MINI) meter device kit Use to test blood sugars 2 times daily or as directed. 1 kit 0     levothyroxine (SYNTHROID/LEVOTHROID) 175 MCG tablet Take 1 tablet (175 mcg) by mouth every morning (before breakfast) 90 tablet 3     metFORMIN (GLUCOPHAGE) 500 MG tablet Take 1 tablet (500 mg) by mouth 2  times daily (with meals) 60 tablet 11     metoprolol succinate ER (TOPROL-XL) 100 MG 24 hr tablet Take 1 tablet (100 mg) by mouth daily 30 tablet 11     multivitamin w/minerals (MULTI-VITAMIN) tablet Take 1 tablet by mouth daily 30 each 0     nystatin (MYCOSTATIN) 860780 UNIT/GM external cream Apply topically 2 times daily as needed for dry skin 30 g 11     omega 3 1000 MG CAPS Take 1 g by mouth daily 30 capsule 11     order for DME Equipment being ordered: Diabetes Shoes 1 Units 0     order for DME Equipment being ordered: Custom diabetic shoes 1 Units 0     order for DME Equipment being ordered: Bariatric Lift chair  Diagnosis - morbid obesity, CHF, Lymphedema    Fax to Ne from [employer] at [phone]. 1 Units 0     order for DME Equipment being ordered: Other: Velcro Compression stockings.   Treatment Diagnosis: bilateral lymphedema. 2 each 0     potassium chloride ER (K-DUR/KLOR-CON M) 20 MEQ CR tablet Take 2 tablets (40 mEq) by mouth 2 times daily 120 tablet 11     senna-docusate (SENOKOT-S/PERICOLACE) 8.6-50 MG tablet Take 1-2 tablets by mouth 2 times daily as needed for constipation 60 tablet 3     spironolactone (ALDACTONE) 25 MG tablet Take 2 tablets (50 mg) by mouth daily 180 tablet 3     tamsulosin (FLOMAX) 0.4 MG capsule Take 1 capsule (0.4 mg) by mouth daily 30 capsule 11     torsemide (DEMADEX) 100 MG tablet Take one tab (100mg) by mouth every morning, take one half tab (50mg) by mouth every evening 30 tablet 11     vitamin B complex with vitamin C (VITAMIN  B COMPLEX) tablet Take 1 tablet by mouth daily 100 tablet 3     vitamin E (VITAMIN E COMPLEX) 1000 units (450 mg) capsule Take 1 capsule (1,000 Units) by mouth daily 100 capsule 3     warfarin ANTICOAGULANT (COUMADIN) 5 MG tablet Take 12.5 mg by mouth  1x/week, Take 10 mg by mouth 6x per week 75 tablet 11     polyethylene glycol (MIRALAX/GLYCOLAX) packet Take 17 g by mouth daily as needed for constipation (Patient not taking: Reported on  5/12/2020) 15 packet 0       ROS:   As per HPI.    Labs:  CBC RESULTS:  Lab Results   Component Value Date    WBC 11.4 (H) 11/05/2019    RBC 5.96 (H) 11/05/2019    HGB 16.8 11/05/2019    HCT 51.5 11/05/2019    MCV 86 11/05/2019    MCH 28.2 11/05/2019    MCHC 32.6 11/05/2019    RDW 16.8 (H) 11/05/2019     11/05/2019       CMP RESULTS:  Lab Results   Component Value Date     11/05/2019    POTASSIUM 3.8 11/05/2019    CHLORIDE 100 11/05/2019    CO2 25 11/05/2019    ANIONGAP 8 11/05/2019    GLC 82 11/05/2019    BUN 25 11/05/2019    CR 1.21 11/05/2019    GFRESTIMATED 61 11/05/2019    GFRESTBLACK 70 11/05/2019    CHERI 9.0 11/05/2019    BILITOTAL 0.4 11/05/2019    ALBUMIN 3.0 (L) 11/05/2019    ALKPHOS 80 11/05/2019    ALT 21 11/05/2019    AST 21 11/05/2019        INR RESULTS:  Lab Results   Component Value Date    INR 1.4 11/22/2019       Lab Results   Component Value Date    MAG 2.2 03/23/2019     Lab Results   Component Value Date    NTBNPI 2,329 (H) 03/06/2019     Lab Results   Component Value Date    NTBNP 655 (H) 08/27/2019       Assessment and Plan:   Mr. Clarke Moreira is a 69yr old male with a history of HFpEF (LVEF 50-55% per TTE 3/2019), AFib (s/p ablation 2008), morbid obesity, THONG/alveolar hypoventilation (uses CPAP), HTN, dyslipidemia, DMII (c/b neuropathy), depression, anxiety, BPH, and PAD who is being evaluated via telephone encounter for CORE follow up.    Mr. Moreira sounds well today, and denies any evidence of CHF symptoms.  Asked that he call if he were to develop any new symptoms or concerns.  Will plan for him to continue his current medications, with no changes, and to follow up with Dr. Willoughby in 6 months.  Discussed that we would be happy to see him in CORE sooner, if new concerns arise.      Chronic HFpEF (LVEF 50-55% per TTE 3/2019)  Risk factors include age, HTN, diabetes, sleep apnea, obesity and arrhythmia  The mainstays of therapy for HFpEF include volume management, blood  "pressure control, treating underlying sleep apnea if present, treatment of underlying CAD if within the goals of care, weight management, exercise training, rate control for atrial fibrillation as well as consideration for a rhythm control strategy, and consideration for clinical trials. Consideration of spironolactone in low risk patients should be taken given the positive CHF results in the TOPCAT trial.  Stage C. NYHA Class IV (confounded by comorbidities).  Primary Cardiologist: Fartun; Last seen 11/2019  BP: controlled historically, no recent BP data  Fluid status:  Sounds euvolemic per report  Aldosterone antagonist: spironolactone 50mg once daily  Ischemia evaluation: no anginal concerns  ACEi/ARB/ARNI: n/a, no evidence for use in HFpEF  BB: on metoprolol XL 100mg daily for AFib   SCD prophylaxis: n/a, no evidence for use in HFpEF  NSAID use: Contraindicated  Sleep apnea evaluation: Currently using CPAP or BiPAP  Remote monitoring: not a candidate for CardioCom due to inability to weigh self    AFib  Remains on Metoprolol XL 100mg daily and warfarin.    HTN  No recent BP data, but historically BPs have been controlled.        Clarke Moreira is a 69 year old male who is being evaluated via a billable telephone visit.      The patient has been notified of following:     \"This telephone visit will be conducted via a call between you and your physician/provider. We have found that certain health care needs can be provided without the need for a physical exam.  This service lets us provide the care you need with a short phone conversation.  If a prescription is necessary we can send it directly to your pharmacy.  If lab work is needed we can place an order for that and you can then stop by our lab to have the test done at a later time.    Telephone visits are billed at different rates depending on your insurance coverage. During this emergency period, for some insurers they may be billed the same as an " "in-person visit.  Please reach out to your insurance provider with any questions.    If during the course of the call the physician/provider feels a telephone visit is not appropriate, you will not be charged for this service.\"    Patient has given verbal consent for Telephone visit?  Yes    What phone number would you like to be contacted at? [phone]    How would you like to obtain your AVS? Mail a copy      The above was reviewed with Mr. Moreira, who verbalized understanding and will call with further questions/concerns.    30 minutes spent with patient, with >50% in counseling and/or coordination of care as described above.      Shaila Grove, KARENA, FNP-BC, CHFN  Advanced Heart Failure Nurse Practitioner  [facility]  Patient Care Team:  Matthias Sanchez MD as PCP - General (Family Practice)  Jada Mckeon APRN CNP as Nurse Practitioner (Cardiology)  Marta Heath, RN as Nurse Coordinator (Cardiology)  Sybil Norman APRN CNP as Nurse Practitioner (Cardiology)  Laura Phan, JUANPABLO as Nurse Coordinator (Cardiology)  Manas Argueta, JUANPABLO as Specialty Care Coordinator (Cardiology)  Mary Carmen Hill APRN CNP as Assigned PCP  Jennifer Lou PA-C as Physician Assistant (Physician Assistant - Medical)  Tj Palumbo [facility] as Pharmacist (Pharmacist)  Jesús Lagos DPM as MD (Podiatrist Primary Podiatric Medicine)  Ida Reese, RN as Specialty Care Coordinator (Cardiology)  Marychuy Lopez PA-C as Physician Assistant (Cardiology)    "

## 2020-05-12 NOTE — PATIENT INSTRUCTIONS
Take your medicines every day, as directed    Changes made today:  o No medication changes today  o Please call with any changes in your symptoms  o We will plan for you to follow up with Dr. Willoughby in 6 months, or CORE sooner if needed.  We will plan to check labs prior to this appointment.   Monitor Your Weight and Symptoms    Contact us if you:      Gain 2 pounds in one day or 5 pounds in one week    Feel more short of breath    Notice more leg swelling    Feel lightheadeded   Change your lifestyle    Limit Salt or Sodium:    2000 mg  Limit Fluids:    2000 mL or approximately 64 ounces  Eat a Heart Healthy Diet    Low in saturated fats  Stay Active:    Aim to move at least 150 minutes every  week         To Contact us    During Business Hours:  [phone], option # 1 (University)  Then option # 4 (medical questions)     After hours, weekends or holidays:   [phone], Option #4  Ask to speak to the On-Call Cardiologist. Inform them you are a CORE/heart failure patient at the [facility].     Use [facility] allows you to communicate directly with your heart team through secure messaging.    [employer] can be accessed any time on your phone, computer, or tablet.    If you need assistance, we'd be happy to help!         Keep your Heart Appointments:    Fartun in 6 months with labs prior

## 2020-05-12 NOTE — PROGRESS NOTES
"[facility] -- CORE Follow Up -- Telephone Encounter  May 12, 2020    HPI:   Mr. Clarke Moreira is a 69yr old male with a history of HFpEF (LVEF 50-55% per TTE 3/2019), AFib (s/p ablation 2008), morbid obesity, THONG/alveolar hypoventilation (uses CPAP), HTN, dyslipidemia, DMII (c/b neuropathy), depression, anxiety, BPH, and PAD who is being evaluated via telephone encounter for CORE follow up.    Since his last visit, he states that he feels well overall.  He had some \"emotional issues last fall,\" which is why he missed his scheduled appointment.  He has had ongoing problems with neuropathy in his feet, and has had a chronic wound in his right foot since 2018.  He has been treated for osteomyelitis, but has now developed open wounds along the nail/cuticle line that have not healed completely.  His left toes have developed \"hammer toe\" and his entire foot \"has developed into a hermelindo.\"  He is not able to bear weight on his left foot, and the wounds on his right foot have impaired his ability to walk.  He continues to follow with podiatry.  He is not active.  He is \"wobbly\" when on his feet, and denies any falls.  He is unable to stand or walk without any support.  His breathing has been stable, and he denies SOB, PND, and orthopnea.  He uses CPAP at night, and states that he sleeps in a hospital bed at ~20  elevation.  His palpitations have improved since his last hospitalization.  He has not been weighing himself, as it is difficult for him to get up.  He does not feel any CHF symptoms, and denies fluid retention, bloating, and MOISÉS.  He has steady, consistent UOP with torsemide.  His appetite has increased, but he is eating less due to a decreased supply of food.  His diet has not been as well-balanced as his usual, as he has not been able to get access to his usual foods.  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  He is eating " regularly, and reports steady protein intake, but is lacking vegetables and fruit.  He denies nausea, vomiting, diarrhea, and constipation.  He is not checking his BPs.  He otherwise denies chest pain, dizziness, headaches, acute vision changes, fevers, chills, cough, sore throat, and signs of bleeding.      PAST MEDICAL HISTORY:  Past Medical History:   Diagnosis Date     Atrial fibrillation (H)      Basal cell carcinoma      Chronic anticoagulation      Diastolic heart failure (H)      Dyslipidemia      Essential hypertension      Morbid obesity (H)      Sleep-disordered breathing      Type 2 diabetes mellitus (H)        FAMILY HISTORY:  Family History   Problem Relation Age of Onset     Depression Mother      Glaucoma Maternal Grandfather      Cancer No family hx of         No family history of skin cancer     Macular Degeneration No family hx of        SOCIAL HISTORY:  Social History     Socioeconomic History     Marital status: Single     Spouse name: None     Number of children: None     Years of education: None     Highest education level: None   Occupational History     None   Social Needs     Financial resource strain: None     Food insecurity     Worry: None     Inability: None     Transportation needs     Medical: None     Non-medical: None   Tobacco Use     Smoking status: Never Smoker     Smokeless tobacco: Never Used   Substance and Sexual Activity     Alcohol use: No     Comment: Former alcohol abuse. Quit 70s then quit later in 80s-present     Drug use: No     Comment: history of LSD use     Sexual activity: None   Lifestyle     Physical activity     Days per week: None     Minutes per session: None     Stress: None   Relationships     Social connections     Talks on phone: None     Gets together: None     Attends Church service: None     Active member of club or organization: None     Attends meetings of clubs or organizations: None     Relationship status: None     Intimate partner violence      Fear of current or ex partner: None     Emotionally abused: None     Physically abused: None     Forced sexual activity: None   Other Topics Concern     Parent/sibling w/ CABG, MI or angioplasty before 65F 55M? Not Asked   Social History Narrative    Lives in an apartment in 16th floor near hospital.  Has elevators, unable to use stairs. Not able to shop; has things delivered to him including food. Difficulty completing cleaning. Goes to PCP once yearly for annual check up and medication review.       CURRENT MEDICATIONS:  Current Outpatient Medications   Medication Sig Dispense Refill     ammonium lactate (LAC-HYDRIN) 12 % external cream Apply topically 2 times daily as needed for dry skin 385 g 11     Ascorbic Acid (VITAMIN C) POWD Take 500 mcg by mouth daily  0     aspirin (ASA) 81 MG tablet Take 1 tablet (81 mg) by mouth daily 30 tablet 0     atorvastatin (LIPITOR) 40 MG tablet Take 1 tablet (40 mg) by mouth daily 30 tablet 11     bacitracin 500 UNIT/GM external ointment Apply 30 g topically 2 times daily 30 g 1     blood glucose (ONETOUCH ULTRA) test strip Use to test blood sugars 2 times daily or as directed. 100 strip 11     blood glucose monitoring (ONE TOUCH DELICA) lancets Use to test blood sugars 2 times daily or as directed. 100 each 11     blood glucose monitoring (ONE TOUCH ULTRA MINI) meter device kit Use to test blood sugars 2 times daily or as directed. 1 kit 0     levothyroxine (SYNTHROID/LEVOTHROID) 175 MCG tablet Take 1 tablet (175 mcg) by mouth every morning (before breakfast) 90 tablet 3     metFORMIN (GLUCOPHAGE) 500 MG tablet Take 1 tablet (500 mg) by mouth 2 times daily (with meals) 60 tablet 11     metoprolol succinate ER (TOPROL-XL) 100 MG 24 hr tablet Take 1 tablet (100 mg) by mouth daily 30 tablet 11     multivitamin w/minerals (MULTI-VITAMIN) tablet Take 1 tablet by mouth daily 30 each 0     nystatin (MYCOSTATIN) 548735 UNIT/GM external cream Apply topically 2 times daily as needed for  dry skin 30 g 11     omega 3 1000 MG CAPS Take 1 g by mouth daily 30 capsule 11     order for DME Equipment being ordered: Diabetes Shoes 1 Units 0     order for DME Equipment being ordered: Custom diabetic shoes 1 Units 0     order for DME Equipment being ordered: Bariatric Lift chair  Diagnosis - morbid obesity, CHF, Lymphedema    Fax to Ne from [employer] at [phone]. 1 Units 0     order for DME Equipment being ordered: Other: Velcro Compression stockings.   Treatment Diagnosis: bilateral lymphedema. 2 each 0     potassium chloride ER (K-DUR/KLOR-CON M) 20 MEQ CR tablet Take 2 tablets (40 mEq) by mouth 2 times daily 120 tablet 11     senna-docusate (SENOKOT-S/PERICOLACE) 8.6-50 MG tablet Take 1-2 tablets by mouth 2 times daily as needed for constipation 60 tablet 3     spironolactone (ALDACTONE) 25 MG tablet Take 2 tablets (50 mg) by mouth daily 180 tablet 3     tamsulosin (FLOMAX) 0.4 MG capsule Take 1 capsule (0.4 mg) by mouth daily 30 capsule 11     torsemide (DEMADEX) 100 MG tablet Take one tab (100mg) by mouth every morning, take one half tab (50mg) by mouth every evening 30 tablet 11     vitamin B complex with vitamin C (VITAMIN  B COMPLEX) tablet Take 1 tablet by mouth daily 100 tablet 3     vitamin E (VITAMIN E COMPLEX) 1000 units (450 mg) capsule Take 1 capsule (1,000 Units) by mouth daily 100 capsule 3     warfarin ANTICOAGULANT (COUMADIN) 5 MG tablet Take 12.5 mg by mouth  1x/week, Take 10 mg by mouth 6x per week 75 tablet 11     polyethylene glycol (MIRALAX/GLYCOLAX) packet Take 17 g by mouth daily as needed for constipation (Patient not taking: Reported on 5/12/2020) 15 packet 0       ROS:   As per HPI.    Labs:  CBC RESULTS:  Lab Results   Component Value Date    WBC 11.4 (H) 11/05/2019    RBC 5.96 (H) 11/05/2019    HGB 16.8 11/05/2019    HCT 51.5 11/05/2019    MCV 86 11/05/2019    MCH 28.2 11/05/2019    MCHC 32.6 11/05/2019    RDW 16.8 (H) 11/05/2019     11/05/2019       CMP  RESULTS:  Lab Results   Component Value Date     11/05/2019    POTASSIUM 3.8 11/05/2019    CHLORIDE 100 11/05/2019    CO2 25 11/05/2019    ANIONGAP 8 11/05/2019    GLC 82 11/05/2019    BUN 25 11/05/2019    CR 1.21 11/05/2019    GFRESTIMATED 61 11/05/2019    GFRESTBLACK 70 11/05/2019    CHERI 9.0 11/05/2019    BILITOTAL 0.4 11/05/2019    ALBUMIN 3.0 (L) 11/05/2019    ALKPHOS 80 11/05/2019    ALT 21 11/05/2019    AST 21 11/05/2019        INR RESULTS:  Lab Results   Component Value Date    INR 1.4 11/22/2019       Lab Results   Component Value Date    MAG 2.2 03/23/2019     Lab Results   Component Value Date    NTBNPI 2,329 (H) 03/06/2019     Lab Results   Component Value Date    NTBNP 655 (H) 08/27/2019       Assessment and Plan:   Mr. Clarke Moreira is a 69yr old male with a history of HFpEF (LVEF 50-55% per TTE 3/2019), AFib (s/p ablation 2008), morbid obesity, THONG/alveolar hypoventilation (uses CPAP), HTN, dyslipidemia, DMII (c/b neuropathy), depression, anxiety, BPH, and PAD who is being evaluated via telephone encounter for CORE follow up.    Mr. Moreira sounds well today, and denies any evidence of CHF symptoms.  Asked that he call if he were to develop any new symptoms or concerns.  Will plan for him to continue his current medications, with no changes, and to follow up with Dr. Willoughby in 6 months.  Discussed that we would be happy to see him in CORE sooner, if new concerns arise.      Chronic HFpEF (LVEF 50-55% per TTE 3/2019)  Risk factors include age, HTN, diabetes, sleep apnea, obesity and arrhythmia  The mainstays of therapy for HFpEF include volume management, blood pressure control, treating underlying sleep apnea if present, treatment of underlying CAD if within the goals of care, weight management, exercise training, rate control for atrial fibrillation as well as consideration for a rhythm control strategy, and consideration for clinical trials. Consideration of spironolactone in low risk  "patients should be taken given the positive CHF results in the TOPCAT trial.  Stage C. NYHA Class IV (confounded by comorbidities).  Primary Cardiologist: Fartun; Last seen 11/2019  BP: controlled historically, no recent BP data  Fluid status:  Sounds euvolemic per report  Aldosterone antagonist: spironolactone 50mg once daily  Ischemia evaluation: no anginal concerns  ACEi/ARB/ARNI: n/a, no evidence for use in HFpEF  BB: on metoprolol XL 100mg daily for AFib   SCD prophylaxis: n/a, no evidence for use in HFpEF  NSAID use: Contraindicated  Sleep apnea evaluation: Currently using CPAP or BiPAP  Remote monitoring: not a candidate for CardioCom due to inability to weigh self    AFib  Remains on Metoprolol XL 100mg daily and warfarin.    HTN  No recent BP data, but historically BPs have been controlled.        Clarke Moreira is a 69 year old male who is being evaluated via a billable telephone visit.      The patient has been notified of following:     \"This telephone visit will be conducted via a call between you and your physician/provider. We have found that certain health care needs can be provided without the need for a physical exam.  This service lets us provide the care you need with a short phone conversation.  If a prescription is necessary we can send it directly to your pharmacy.  If lab work is needed we can place an order for that and you can then stop by our lab to have the test done at a later time.    Telephone visits are billed at different rates depending on your insurance coverage. During this emergency period, for some insurers they may be billed the same as an in-person visit.  Please reach out to your insurance provider with any questions.    If during the course of the call the physician/provider feels a telephone visit is not appropriate, you will not be charged for this service.\"    Patient has given verbal consent for Telephone visit?  Yes    What phone number would you like to be " contacted at? [phone]    How would you like to obtain your AVS? Mail a copy      The above was reviewed with Mr. Moreira, who verbalized understanding and will call with further questions/concerns.    30 minutes spent with patient, with >50% in counseling and/or coordination of care as described above.      Shaila Grove, KARENA, FNP-BC, CHFN  Advanced Heart Failure Nurse Practitioner  [facility]  Patient Care Team:  Matthias Sanchez MD as PCP - General (Family Practice)  Jada Mckeon APRN CNP as Nurse Practitioner (Cardiology)  Marta Heath, RN as Nurse Coordinator (Cardiology)  Sybil Norman APRN CNP as Nurse Practitioner (Cardiology)  Laura Phan, JUANPABLO as Nurse Coordinator (Cardiology)  Manas Argueta, JUANPABLO as Specialty Care Coordinator (Cardiology)  Mary Carmen Hill APRN CNP as Assigned PCP  Jennifer Lou PA-C as Physician Assistant (Physician Assistant - Medical)  Tj Palumbo, [facility] as Pharmacist (Pharmacist)  Jesús Lagos DPM as MD (Podiatrist Primary Podiatric Medicine)  Ida Reese, RN as Specialty Care Coordinator (Cardiology)  Marychuy Lopez PA-C as Physician Assistant (Cardiology)  SELF, REFERRED

## 2020-05-14 ENCOUNTER — DOCUMENTATION ONLY (OUTPATIENT)
Dept: CARE COORDINATION | Facility: CLINIC | Age: 70
End: 2020-05-14

## 2020-05-18 ENCOUNTER — VIRTUAL VISIT (OUTPATIENT)
Dept: ORTHOPEDICS | Facility: CLINIC | Age: 70
End: 2020-05-18
Payer: COMMERCIAL

## 2020-05-18 DIAGNOSIS — E11.49 TYPE II OR UNSPECIFIED TYPE DIABETES MELLITUS WITH NEUROLOGICAL MANIFESTATIONS, NOT STATED AS UNCONTROLLED(250.60) (H): Primary | ICD-10-CM

## 2020-05-18 DIAGNOSIS — L97.512 DIABETIC ULCER OF TOE OF RIGHT FOOT ASSOCIATED WITH TYPE 2 DIABETES MELLITUS, WITH FAT LAYER EXPOSED (H): ICD-10-CM

## 2020-05-18 DIAGNOSIS — E11.621 DIABETIC ULCER OF TOE OF RIGHT FOOT ASSOCIATED WITH TYPE 2 DIABETES MELLITUS, WITH FAT LAYER EXPOSED (H): ICD-10-CM

## 2020-05-18 DIAGNOSIS — E11.621 DIABETIC ULCER OF TOE OF LEFT FOOT ASSOCIATED WITH TYPE 2 DIABETES MELLITUS, LIMITED TO BREAKDOWN OF SKIN (H): ICD-10-CM

## 2020-05-18 DIAGNOSIS — L97.521 DIABETIC ULCER OF TOE OF LEFT FOOT ASSOCIATED WITH TYPE 2 DIABETES MELLITUS, LIMITED TO BREAKDOWN OF SKIN (H): ICD-10-CM

## 2020-05-18 NOTE — PROGRESS NOTES
"Clarke Moreira is a 69 year old male who is being evaluated via a billable telephone visit.      The patient has been notified of following:     \"This telephone visit will be conducted via a call between you and your physician/provider. We have found that certain health care needs can be provided without the need for a physical exam.  This service lets us provide the care you need with a short phone conversation.  If a prescription is necessary we can send it directly to your pharmacy.  If lab work is needed we can place an order for that and you can then stop by our lab to have the test done at a later time.    Telephone visits are billed at different rates depending on your insurance coverage. During this emergency period, for some insurers they may be billed the same as an in-person visit.  Please reach out to your insurance provider with any questions.    If during the course of the call the physician/provider feels a telephone visit is not appropriate, you will not be charged for this service.\"    Patient has given verbal consent for Telephone visit?  Yes    What phone number would you like to be contacted at? [phone]    How would you like to obtain your AVS? Mail a copy    Phone call duration: 5 minutes    Jesús Lagos DPM    Chief Complaint:   Chief Complaint   Patient presents with     Follow Up     Wound, right great toe. Patient stated that there has been no change. Redness, no odor, no fever, no pain. Pain, left great toe. Patient stated that this is a callus that has scabbed over. Patient stated that this is difficult to walk on.           Allergies   Allergen Reactions     Seasonal Allergies          Subjective: Clarke is a 69 year old male who presents via telephone with right foot ulcer. Relates that the area on the big toe is still open. Has a new open area on the left toes.     Objective  Data Unavailable Data Unavailable Data Unavailable Data Unavailable Data Unavailable 0 lbs 0 " oz  Hemoglobin A1C   Date Value Ref Range Status   02/26/2019 6.1 (H) 0 - 5.6 % Final     Comment:     Normal <5.7% Prediabetes 5.7-6.4%  Diabetes 6.5% or higher - adopted from ADA   consensus guidelines.     08/28/2018 5.7 4.1 - 5.7 % Final   03/27/2018 6.3 (H) 4.1 - 5.7 % Final         Assessment: DM2 with neuropathy and BL foot ulcerations.     Plan:   - Pt seen and evaluated  - Needs to be seen in wound clinic. Will schedule him for this. In the meantime, cont with betadine on all the areas.  - Pt to return to clinic on 5/28.

## 2020-05-20 ENCOUNTER — VIRTUAL VISIT (OUTPATIENT)
Dept: PSYCHOLOGY | Facility: CLINIC | Age: 70
End: 2020-05-20
Payer: COMMERCIAL

## 2020-05-20 DIAGNOSIS — F33.1 MODERATE EPISODE OF RECURRENT MAJOR DEPRESSIVE DISORDER (H): Primary | ICD-10-CM

## 2020-05-20 NOTE — PROGRESS NOTES
"Behavioral Health Progress Note    Client Legal Name: Clarke Moreira   Client Preferred Name: Clarke   Service Type: Individual  Length of Visit: 41 minutes  Attendees: patient     The following statement was read to the patient at the outset of this visit:     \"We have found that certain health care needs can be provided without the need for a face to face visit.  This service lets us provide the care you need with a phone conversation. I will have full access to your [facility] medical record during this entire phone call.   I will be taking notes for your medical record.  Since this is like an office visit, we will bill your insurance company for this service. There are potential benefits and risks of telephone visits (e.g. limits to patient confidentiality) that differ from in-person visits.?  Confidentiality still applies for telephone services, and nobody will record the visit.  It is important to be in a quiet, private space that is free of distractions (including cell phone or other devices) during the visit.??If during the course of the call I believe a telephone visit is not appropriate, you will not be charged for this service.\"     Consent has been obtained for this service by care team member: Yes     Emergency contact: Janice Dubon Emergency Room: [place]  Location at time of call: home    Start of call: 9:31  End of call: 10:12      Identifying Information and Presenting Problem:    The patient is a 69 year old American male who I last saw in clinic in October 2019.  Had a phone visit a couple of weeks ago. Today our visit is scheduled to focus on ongoing symptoms of depression, adjustment to medical illness, and social isolation.      Treatment Objective(s) Addressed in This Session:               Clarke will practice a healthy daily discipline of diet and exercise.    Clarke will make and keep appointments with appropriate time limits to reduce disabling anxiety about leaving " "the                house.     Progress on / Status of Treatment Objective(s) / Homework:  No change    PHQ-9 SCORE 8/27/2019 9/3/2019 1/6/2020   PHQ-9 Total Score - - -   PHQ-9 Total Score 23 25 21       CHRIS-7 SCORE 12/11/2018 5/10/2019 9/3/2019   Total Score - - -   Total Score 20 13 19     Topics Discussed/Interventions Provided:  Clarke was wondering if there is a co-pay for the telephone visits.  It took a lot of courage for him to ask this question. Provided positive reinforcement for his willingness to ask for information he needs.  I did not know the answer, but will try to find out.    Clarke has continued to have the experience of feeling that he can be more of himself when meeting with providers by phone rather than in person.  Discussed what the response of the providers has been since he has presenting differently to them.  Clarke states he has not noticed a difference really. Suggested this may be evidence that \"just being himself\" may be more acceptable and effective than his thoughts are allowing him to believe.      Clarke shared a recent experience.  He typically has ongoing thoughts of \"I am defective\" \"I am worthless\" \"Whatever I do, never works out\"  Despite these thoughts, he ordered himself a smartphone from tv.  When the box came, he was so anxious and fearful about the smartphone - that he would not be able to get it set up or that it wouldn't be the help that he was hoping for, that he could not take it out of the box for three weeks.  One day he took it out of the box and read the instructions completely.  He did the steps, but it did not work.  He was quite frustrated, but he persevered and called the helpline.  There was no answer, so he left a message.  They have not called him back.  He ultimately decided to return the phone.  Discussed with Clarke which pieces of the story, he was focusing on - where it was sent back.  Highlighted the courage he demonstrated in taking the many " steps he took prior to sending it back.      Clarke identifies this experience as very triggering for him as it brings up all the emotions he felt as a child with his mother.  There are parts of the situation with the phone where he felt he may have dissociated.  A previous therapist did talk to him briefly about PTSD many years ago.  He has never done treatment for PTSD. We agreed to dsicuss PTSD a bit more at his next visit.     Assessment: The patient sounded engaged in the conversation today via phone.      Mental Status: Clarke sounded alert and oriented.  Speech was normal rate and rhythm. Mood was described as  Anxious and down.  Thought processes were logical and coherent.  Thought content was WNL with no evidence of psychotic or paranoid features.  Reports suicidal thoughts, but is denying any plan or any intent to do anything to harm himself.  Memory appeared grossly intact. Insight and judgment appeared good and patient exhibited good impulse control during the appointment.     Does the patient appear to be at imminent risk of harm to self/others at this time? No    The session was necessary to identify and reframe negative thoughts that contribute to depression.  Symptoms of depression and anxiety have continued to interfere with his ability to function at home and cause distress that can interfere with his ability to care for himself.  Ongoing psychotherapy is necessary to provide counseling and provide support.    Diagnosis (DSM-5):  Major Depression, recurrent, moderate  Generalized Anxiety Disorder  R/o persistent depressive disorder    Plan:  1. Return 6/4 at 8:20  2. Check on co-pays for phone visits  3. Continue to follow with Dr. Sanchez for medications   4. Need to update dx assessment at a future appt.   5. Assess possible PTSD sxs further at that time.    NOTE: Treatment plan updated needed on 8/12/20.  Diagnostic assessment update due 10/15/19.

## 2020-06-01 ENCOUNTER — TELEPHONE (OUTPATIENT)
Dept: PSYCHOLOGY | Facility: CLINIC | Age: 70
End: 2020-06-01

## 2020-06-01 NOTE — TELEPHONE ENCOUNTER
Called Clarke to check-in and to let him know about the supply drive that is happening today at clinic. States he is safe and doing OK.  Said he can't think of anything he is needing right now.  Was thankful for the call.

## 2020-06-04 ENCOUNTER — VIRTUAL VISIT (OUTPATIENT)
Dept: PSYCHOLOGY | Facility: CLINIC | Age: 70
End: 2020-06-04
Payer: COMMERCIAL

## 2020-06-04 DIAGNOSIS — F33.1 MODERATE EPISODE OF RECURRENT MAJOR DEPRESSIVE DISORDER (H): Primary | ICD-10-CM

## 2020-06-04 DIAGNOSIS — F41.1 GAD (GENERALIZED ANXIETY DISORDER): ICD-10-CM

## 2020-06-04 NOTE — PROGRESS NOTES
"Behavioral Health Progress Note    Client Legal Name: Clarke Moreira   Client Preferred Name: Clarke   Service Type: Individual  Length of Visit: 40 minutes  Attendees: patient     The following statement was read to the patient at the outset of this visit:     \"We have found that certain health care needs can be provided without the need for a face to face visit.  This service lets us provide the care you need with a phone conversation. I will have full access to your [facility] medical record during this entire phone call.   I will be taking notes for your medical record.  Since this is like an office visit, we will bill your insurance company for this service. There are potential benefits and risks of telephone visits (e.g. limits to patient confidentiality) that differ from in-person visits.?  Confidentiality still applies for telephone services, and nobody will record the visit.  It is important to be in a quiet, private space that is free of distractions (including cell phone or other devices) during the visit.??If during the course of the call I believe a telephone visit is not appropriate, you will not be charged for this service.\"     Consent has been obtained for this service by care team member: Yes     Emergency contact: Janice Dubon Emergency Room: [place]  Location at time of call: home    Start of call: 8:20  End of call: 9      Identifying Information and Presenting Problem:    The patient is a 70 year old American male who I last saw in clinic in October 2019.  We have had some phone visits since the start of COVID.  Clarke reports feeling much more comfortable in all of his appointments when they are done via telephone than when he is in person.  In person appointments with all providers have always caused significant distress due to his desire to be perceived in a particular way.  On the telephone, he feels as if he can better articulate his experience and what he is " thinking as he feels less need to project a particular image to those that are caring for him.     Treatment Objective(s) Addressed in This Session:               Clarke will practice a healthy daily discipline of diet and exercise.    Clarke will make and keep appointments with appropriate time limits to reduce disabling anxiety about leaving the                house.     Progress on / Status of Treatment Objective(s) / Homework:  No change    PHQ-9 SCORE 8/27/2019 9/3/2019 1/6/2020   PHQ-9 Total Score - - -   PHQ-9 Total Score 23 25 21       CHRIS-7 SCORE 12/11/2018 5/10/2019 9/3/2019   Total Score - - -   Total Score 20 13 19     Topics Discussed/Interventions Provided:  Clarke reports that he is doing OK.  Feeling many emotions when he looks out his apartment windows and see the community that he took for granted destroyed. Experiencing the emotions he states he is always having where he feels helpless and unable to help.      Got a call from a woman he has known for twenty years that he used to spend some time with. She lives in [place], but she called looking for a listening ear on Tuesday night.  [place] like there was always possibility for a deeper friendship,but he could never let himself be vulnerable and need her in anyway and they grew apart.  During the conversation he described feeling alert and awake in a way he hasn't felt in a long time, but was crushed when they hung up. States that he liked the person that he was in that moment, but feels like that person isn't real.  Discussed how that person is him and that its not in the past tense.  Clarke describes always feeling like he is standing on the edge oa eyal when it comes to deepening relationships and he always chooses the path of backing away because it feels too scary and vulnerable to take the jump. Discussed how there may not be a eyal on the other side but perhaps a step. Discussed the need to take a risk in order to gather  information that demonstrates that he will be OK even if he is vulnerable. Identified his friendship with Janice as one that feels he can't be himself. He feels like he needs to amuse her in order for her to keep coming around despite her ongoing presence for many years.  Explored what this step might be within his friendship with Janice..  We will identify more specifics at our next visit.       Did not assess further for PTSD today. Will discuss this further at next visit.     Assessment: The patient sounded engaged in the conversation today via phone.      Mental Status: Clarke sounded alert and oriented.  Speech was normal rate and rhythm. Mood was described as sad and down.  Thought processes were logical and coherent.  Thought content was WNL with no evidence of psychotic or paranoid features.  Reports ongoing suicidal thoughts, but denies any plan or any intent to do anything to harm himself.  Memory appeared grossly intact. Insight and judgment appeared good and patient exhibited good impulse control during the appointment.     Does the patient appear to be at imminent risk of harm to self/others at this time? No    The session was necessary to identify and reframe negative thoughts that contribute to depression.  Symptoms of depression and anxiety have continued to interfere with his ability to function at home and cause distress that can interfere with his ability to care for himself.  Ongoing psychotherapy is necessary to provide counseling and provide support.    Diagnosis (DSM-5):  Major Depression, recurrent, moderate  Generalized Anxiety Disorder  R/o persistent depressive disorder    Plan:  1. Return 6/18 at 9 am   2. Continue to follow with Dr. Sanchez for medications   3. Need to update dx assessment at a future appt.   4. Assess possible PTSD sxs further at that time.    NOTE: Treatment plan updated needed on 8/12/20.  Diagnostic assessment update due 10/15/19.

## 2020-06-08 DIAGNOSIS — I50.32 CHRONIC HEART FAILURE WITH PRESERVED EJECTION FRACTION (H): ICD-10-CM

## 2020-06-08 DIAGNOSIS — I48.0 PAROXYSMAL ATRIAL FIBRILLATION (H): ICD-10-CM

## 2020-06-08 DIAGNOSIS — K59.00 CONSTIPATION, UNSPECIFIED CONSTIPATION TYPE: ICD-10-CM

## 2020-06-08 DIAGNOSIS — E11.9 TYPE 2 DIABETES MELLITUS WITHOUT COMPLICATION, WITHOUT LONG-TERM CURRENT USE OF INSULIN (H): ICD-10-CM

## 2020-06-08 DIAGNOSIS — R33.9 URINARY RETENTION: ICD-10-CM

## 2020-06-08 DIAGNOSIS — I50.22 CHRONIC SYSTOLIC CONGESTIVE HEART FAILURE (H): Primary | ICD-10-CM

## 2020-06-08 RX ORDER — TORSEMIDE 100 MG/1
150 TABLET ORAL DAILY
Qty: 30 TABLET | Refills: 11 | Status: SHIPPED | OUTPATIENT
Start: 2020-06-08 | End: 2021-05-26

## 2020-06-08 RX ORDER — TAMSULOSIN HYDROCHLORIDE 0.4 MG/1
0.4 CAPSULE ORAL DAILY
Qty: 30 CAPSULE | Refills: 11 | Status: SHIPPED | OUTPATIENT
Start: 2020-06-08 | End: 2021-05-26

## 2020-06-08 RX ORDER — METOPROLOL SUCCINATE 100 MG/1
100 TABLET, EXTENDED RELEASE ORAL DAILY
Qty: 30 TABLET | Refills: 11 | Status: SHIPPED | OUTPATIENT
Start: 2020-06-08 | End: 2021-05-26

## 2020-06-08 RX ORDER — AMOXICILLIN 250 MG
1-2 CAPSULE ORAL 2 TIMES DAILY PRN
Qty: 60 TABLET | Refills: 3 | Status: SHIPPED | OUTPATIENT
Start: 2020-06-08 | End: 2020-09-30

## 2020-06-08 NOTE — TELEPHONE ENCOUNTER

## 2020-06-18 ENCOUNTER — VIRTUAL VISIT (OUTPATIENT)
Dept: PSYCHOLOGY | Facility: CLINIC | Age: 70
End: 2020-06-18
Payer: COMMERCIAL

## 2020-06-18 DIAGNOSIS — F33.1 MODERATE EPISODE OF RECURRENT MAJOR DEPRESSIVE DISORDER (H): Primary | ICD-10-CM

## 2020-06-18 DIAGNOSIS — F41.1 GAD (GENERALIZED ANXIETY DISORDER): ICD-10-CM

## 2020-06-18 NOTE — PROGRESS NOTES
"Behavioral Health Progress Note    Client Legal Name: Clarke Moreira   Client Preferred Name: Clarke   Service Type: Individual  Length of Visit: 44 minutes, 9:10 - 9:54  Attendees: patient     The following statement was read to the patient at the outset of this visit:     \"We have found that certain health care needs can be provided without the need for a face to face visit.  This service lets us provide the care you need with a phone conversation. I will have full access to your [facility] medical record during this entire phone call.   I will be taking notes for your medical record.  Since this is like an office visit, we will bill your insurance company for this service. There are potential benefits and risks of telephone visits (e.g. limits to patient confidentiality) that differ from in-person visits.?  Confidentiality still applies for telephone services, and nobody will record the visit.  It is important to be in a quiet, private space that is free of distractions (including cell phone or other devices) during the visit.??If during the course of the call I believe a telephone visit is not appropriate, you will not be charged for this service.\"     Consent has been obtained for this service by care team member: Yes     Emergency contact: Janice Dubon Emergency Room: [place]  Location at time of call: home      Identifying Information and Presenting Problem:    The patient is a 70 year old American male who I last saw in clinic in October 2019.  We have had some phone visits since the start of COVID.  Clarke reports feeling much more comfortable in all of his appointments when they are done via telephone than when he is in person.  In person appointments with all providers have always caused significant distress due to his desire to be perceived in a particular way.  On the telephone, he feels as if he can better articulate his experience and what he is thinking as he feels less need to " "project a particular image to those that are caring for him.     Treatment Objective(s) Addressed in This Session:               Clarke will practice a healthy daily discipline of diet and exercise.    Clarke will make and keep appointments with appropriate time limits to reduce disabling anxiety about leaving the house.   Progress on / Status of Treatment Objective(s) / Homework:  Ongoing symptoms of depression and anxiety, but sxs have remained stable.      PHQ-9 SCORE 8/27/2019 9/3/2019 1/6/2020   PHQ-9 Total Score - - -   PHQ-9 Total Score 23 25 21       CHRIS-7 SCORE 12/11/2018 5/10/2019 9/3/2019   Total Score - - -   Total Score 20 13 19     Topics Discussed/Interventions Provided:  Clarke started the session by reading a poem by Christa that really resonated with him.  Reflected his feelings that he is the boat by himself, treading a metaphoric ocean of emotional.  He tried calling some people off the list that I had given him and was not able to find someone easily to  grocieries. There were lots of logistics and it got hard.  But, he kept trying which is progress for him. Called 4-5 people. Felt very vulnerable to keep trying, but was able to sit with those feelings.  Had a conversation recently with his friend Janice that he states he always feels like he has to entertain in order to be a part of their friendship. He shared some of these thoughts with her. She was appreciative of his honesty and let him know all the things about their friendship that she enjoys. Reassured him that he does not have to be \"on\" to talk to her.  Clarke observed that he felt closer to her after they had that conversation.  Recently was contacted by a man that he has known for 35 years who still participates in a men's get together somewhat like they used to do.  This person has been in some contact with Clarke and called him as there was someone else from the group that was wondering if he could reach out. Clarke said " yes and this person called.  They also invited him to their weekly 90 minute zoom chat.  Clarke is very nervous about this.  Discussed ways for him to approach this situation by stating that a group setting makes him really nervous right now and talking with this person is there other ways they can stay connected as he tries to figure out if he would like to participate in the group.  This person offered to get him groceries and Clarke turned him down. Discussed his reasons for this and encouraged Clarke to have a conversation with him that shares what he is really thinking about this, rather than just putting the person off.      Assessment: The patient sounded engaged in the conversation today via phone.      Mental Status: Clarke sounded alert and oriented.  Speech was normal rate and rhythm. Mood was described as down, but reports some moments of feeling hope as he has connected with people, this hope is often followed by increased anxiety.  Thought processes were logical and coherent.  Thought content was WNL with no evidence of psychotic or paranoid features.  Reports ongoing suicidal thoughts, but denies any plan or any intent to do anything to harm himself.  Memory appeared grossly intact. Insight and judgment appeared good and patient exhibited good impulse control during the appointment.     Does the patient appear to be at imminent risk of harm to self/others at this time? No    The session was necessary to identify and reframe negative thoughts that contribute to depression and anxiety, as well as contribute to his thoughts that he needs to stay distanced from others in order to be safe. Symptoms of depression and anxiety have continued to interfere with his ability to function at home and cause distress that can interfere with his ability to care for himself.  Ongoing psychotherapy is necessary to provide counseling and provide support.    Diagnosis (DSM-5):  Major Depression, recurrent,  moderate  Generalized Anxiety Disorder  R/o persistent depressive disorder    Plan:  1. Return 7/2 at 9 am  2. Continue to follow with Dr. Sanchez for medications   3. Need to update dx assessment at a future appt.   4. Assess possible PTSD sxs further if luis is interested in this    NOTE: Treatment plan updated needed on 8/12/20.  Diagnostic assessment update due 10/15/19.

## 2020-06-29 ENCOUNTER — TELEPHONE (OUTPATIENT)
Dept: FAMILY MEDICINE | Facility: CLINIC | Age: 70
End: 2020-06-29

## 2020-06-29 DIAGNOSIS — Z99.89 BIPAP (BIPHASIC POSITIVE AIRWAY PRESSURE) DEPENDENCE: Primary | ICD-10-CM

## 2020-06-29 NOTE — TELEPHONE ENCOUNTER
[facility] phone call message - order or referral request from patient:     Order or referral being requested: Requested order: new prescription for CPAP face mask with headgear and filters    Additional Details: new prescription needed per patient    OK to leave a message on voice mail? Yes    Primary language: English      needed? No    Call taken on June 29, 2020 at 12:04 PM by Apryl Smith    Order request route to [facility] TRIAGE   Referrals Route to [facility] (Green/[place]/Purple) CARE COORDINATOR

## 2020-06-30 NOTE — TELEPHONE ENCOUNTER
DME (Durable Medical Equipment) Orders and Documentation  No orders of the defined types were placed in this encounter.     The patient was assessed and it was determined the patient is in need of the following listed DME Supplies/Equipment. Please complete supporting documentation below to demonstrate medical necessity.      DME All Other Item(s) Documentation    List reason for need and supporting documentation for medical necessity below for each DME item.     1.Sleep apnea with h/o respiratory failure

## 2020-07-02 ENCOUNTER — VIRTUAL VISIT (OUTPATIENT)
Dept: PSYCHOLOGY | Facility: CLINIC | Age: 70
End: 2020-07-02
Payer: COMMERCIAL

## 2020-07-02 DIAGNOSIS — F33.1 MODERATE EPISODE OF RECURRENT MAJOR DEPRESSIVE DISORDER (H): Primary | ICD-10-CM

## 2020-07-02 DIAGNOSIS — F41.1 GAD (GENERALIZED ANXIETY DISORDER): ICD-10-CM

## 2020-07-02 NOTE — PROGRESS NOTES
"Behavioral Health Progress Note    Client Legal Name: Clarke Moreira   Client Preferred Name: Clarke   Service Type: Individual  Length of Visit: 9 am - 9:46  Attendees: patient     The following statement was read to the patient at the outset of this visit:     \"We have found that certain health care needs can be provided without the need for a face to face visit.  This service lets us provide the care you need with a phone conversation. I will have full access to your [facility] medical record during this entire phone call.   I will be taking notes for your medical record.  Since this is like an office visit, we will bill your insurance company for this service. There are potential benefits and risks of telephone visits (e.g. limits to patient confidentiality) that differ from in-person visits.?  Confidentiality still applies for telephone services, and nobody will record the visit.  It is important to be in a quiet, private space that is free of distractions (including cell phone or other devices) during the visit.??If during the course of the call I believe a telephone visit is not appropriate, you will not be charged for this service.\"     Consent has been obtained for this service by care team member: Yes     Emergency contact: Janice Dubon Emergency Room: [place]  Location at time of call: home      Identifying Information and Presenting Problem:    The patient is a 70 year old American male who I last saw in clinic in October 2019.  We have had some phone visits since the start of COVID.  Clarke reports feeling much more comfortable in all of his appointments when they are done via telephone than when he is in person.  In person appointments with all providers have always caused significant distress due to his desire to be perceived in a particular way.  On the telephone, he feels as if he can better articulate his experience and what he is thinking as he feels less need to project a " "particular image to those that are caring for him.     Treatment Objective(s) Addressed in This Session:               Clarke will practice a healthy daily discipline of diet and exercise.    Clarke will make and keep appointments with appropriate time limits to reduce disabling anxiety about leaving the house.   Progress on / Status of Treatment Objective(s) / Homework:  Ongoing symptoms of depression and anxiety, but sxs have remained stable.      PHQ-9 SCORE 8/27/2019 9/3/2019 1/6/2020   PHQ-9 Total Score - - -   PHQ-9 Total Score 23 25 21       CHRIS-7 SCORE 12/11/2018 5/10/2019 9/3/2019   Total Score - - -   Total Score 20 13 19     Topics Discussed/Interventions Provided:  Clarke reports that 4 hours after we last spoke, Janice ended up coming to town and picked up groceries.  Felt like she may have rearranged things to come back into town to help him out. This felt very uncomfortable.  Processed feelings about this, as well as discussed that these are the decisions that friends make for each other.  She told him \"that's what friends do and I will always be your friend.\" Clarke continues to grapple with this as he is uncertain why she would want to be his friend.  When Janice came to bring his groceries, she was not wearing a mask.  A few days later Clarke spoke to her on the phone. He would have felt more comfortable if she was wearing a mask. He was scared to bring this up because he didn't want to challenge her as he was having thoughts she would reject him if he said anything. Instead, he asked if he could buy her a mask.  She declined.  Clarke felt very good that he 1. Said anything to her. 2. That he thought of asking her before just buying her a mask and giving it to her.  3. That he did not still buy a mask anyway to give to her.  Discussed the feelings he experienced after she declined him buying a mask, as well as how he might approach this conversation if he wants her to wear a mask when she comes " to his apartment.    Clarke did look into the men's group and left the joleen a message.  The joleen called back and left a message. Clarke felt like the voice this person used when leaving a message back for him was very odd and he felt scared when he heard it.  He decided that he would continue to practice accepting someone for their good intentions and called this person back again.  The conversation was very focused on the other joleen's life story.  Clarke was feeling uncomfortable during the conversation. Provided positive reinforcement for his willingness to take action and have the conversation even after feeling so uncomfortable with the message. This is further than Clarke has gotten in the past.  Encouraged him to continue to consider the group as an option even if this one person is not the best spokesperson for the group.  Challenged unhelpful thoughts that Clarke was having about potentially engaging in this group.        Assessment: The patient sounded engaged in the conversation today via phone.      Mental Status: Clarke sounded alert and oriented.  Speech was of normal rate and rhythm. Mood was described as down and anxious, but also positive about how he has navigated these situations.  Thought processes were logical and coherent.  Thought content was WNL with no evidence of psychotic or paranoid features.  Reports ongoing suicidal thoughts, but denies any plan or any intent to do anything to harm himself.  Memory appeared grossly intact. Insight and judgment appeared good and patient exhibited good impulse control during the appointment.     Does the patient appear to be at imminent risk of harm to self/others at this time? No    The session was necessary to identify and reframe negative thoughts that contribute to depression and anxiety, as well as contribute to his thoughts that he needs to stay distanced from others in order to be safe. Symptoms of depression and anxiety have continued to  interfere with his ability to function at home and cause distress that can interfere with his ability to care for himself.  Ongoing psychotherapy is necessary to provide counseling and provide support.    Diagnosis (DSM-5):  Major Depression, recurrent, moderate  Generalized Anxiety Disorder  R/o persistent depressive disorder    Plan:  1. Return 7/23 at 9 am  2. Continue to follow with Dr. Sanchez for medications   3. Asked Clarke to give himself some positive recognition for how he approached these situations utilizing different strategies than avoidance like he has in the past.   4. Need to update dx assessment at a future appt.   5. Assess possible PTSD sxs further if clarke is interested in this    NOTE: Treatment plan updated needed on 8/12/20.  Diagnostic assessment update due 10/15/19.

## 2020-07-06 NOTE — PROGRESS NOTES
Chief Complaint:   No chief complaint on file.         Allergies   Allergen Reactions     Seasonal Allergies          Subjective: Clarke is a 69 year old male who presents to the clinic today for a diabetic foot exam and management. I had a telephone visit with him 5/18/2020 and wanted him to return to clinic 10 days from then. The follow up was canceled and remade twice. Relates that he is having pain in the left 2nd and 3rd toes x 2 weeks. Relates that the nail on the 2nd digit does bleed some.      Objective  Lab Results   Component Value Date    A1C 6.1 02/26/2019    A1C 5.7 08/28/2018    A1C 6.3 03/27/2018    A1C 6.3 11/27/2017    A1C 6.5 08/14/2017         Hyperkeratotic lesions noted to the distal left 3rd digit. This was sharply debrided and no open lesions were noted underneath. Onychomycosis x 10.   DP and PT pulses 1/4 BL. CRT 1 second. Absent pedal hair.  Gross and protective sensations are intact BL.  Hammertoes to all lesser digits. Hallux malleus BL. Equinus BL. Pes planus. tylo      Right hallux nail was completely macerated and lytic. This was sharply removed. No deep extension into the eponychium. Odor decreased with cleansing No s/s of infection noted.           Assessment: Clarke is a 68 YO male with DM2 and neuropathy.  Ulcerations are all healed.  Onychomycosis  Tyloma.      Plan:   - Pt seen and evaluated  - Tylomas debrided x 2.  - Nails debrided x 10.      - No open lesions.   - He does have a crevice on the right hallux nail and the left 2nd digit plantarly. Use interdry to the areas and change daily.   - Has not had an A1c in some time. Will get this.   - Pt to return to clinic in 9 weeks.

## 2020-07-07 DIAGNOSIS — I50.32 CHRONIC HEART FAILURE WITH PRESERVED EJECTION FRACTION (H): ICD-10-CM

## 2020-07-07 NOTE — TELEPHONE ENCOUNTER
"Request for medication refill: [facility]     Providers if patient needs an appointment and you are willing to give a one month supply please refill for one month and  send a letter/MyChart using \".SMILLIMITEDREFILL\" .smillimited and route chart to \"P SMI \" (Giving one month refill in non controlled medications is strongly recommended before denial)    If refill has been denied, meaning absolutely no refills without visit, please complete the smart phrase \".smirxrefuse\" and route it to the \"P SMI MED REFILLS\"  pool to inform the patient and the pharmacy.    Yajaira Longoria, CMA        "

## 2020-07-09 ENCOUNTER — OFFICE VISIT (OUTPATIENT)
Dept: WOUND CARE | Facility: CLINIC | Age: 70
End: 2020-07-09
Payer: COMMERCIAL

## 2020-07-09 DIAGNOSIS — B35.1 ONYCHOMYCOSIS: ICD-10-CM

## 2020-07-09 DIAGNOSIS — E11.65 TYPE 2 DIABETES MELLITUS WITH HYPERGLYCEMIA, WITHOUT LONG-TERM CURRENT USE OF INSULIN (H): ICD-10-CM

## 2020-07-09 DIAGNOSIS — E11.65 TYPE 2 DIABETES MELLITUS WITH HYPERGLYCEMIA, WITHOUT LONG-TERM CURRENT USE OF INSULIN (H): Primary | ICD-10-CM

## 2020-07-09 DIAGNOSIS — E11.49 TYPE II OR UNSPECIFIED TYPE DIABETES MELLITUS WITH NEUROLOGICAL MANIFESTATIONS, NOT STATED AS UNCONTROLLED(250.60) (H): ICD-10-CM

## 2020-07-09 DIAGNOSIS — L84 TYLOMA: ICD-10-CM

## 2020-07-09 LAB — HBA1C MFR BLD: 5.9 % (ref 0–5.6)

## 2020-07-09 RX ORDER — POTASSIUM CHLORIDE 1500 MG/1
40 TABLET, EXTENDED RELEASE ORAL 2 TIMES DAILY
Qty: 120 TABLET | Refills: 11 | Status: SHIPPED | OUTPATIENT
Start: 2020-07-09 | End: 2021-06-29

## 2020-07-09 NOTE — LETTER
7/9/2020     RE: Clarke Moreira  [address]  [facility] [id number]     Dear Colleague,    Thank you for referring your patient, Clarke Moreira, to the [facility] at [facility]. Please see a copy of my visit note below.    Chief Complaint:   No chief complaint on file.    Allergies   Allergen Reactions     Seasonal Allergies      Subjective: Clarke is a 69 year old male who presents to the clinic today for a diabetic foot exam and management. I had a telephone visit with him 5/18/2020 and wanted him to return to clinic 10 days from then. The follow up was canceled and remade twice. Relates that he is having pain in the left 2nd and 3rd toes x 2 weeks. Relates that the nail on the 2nd digit does bleed some.      Objective  Lab Results   Component Value Date    A1C 6.1 02/26/2019    A1C 5.7 08/28/2018    A1C 6.3 03/27/2018    A1C 6.3 11/27/2017    A1C 6.5 08/14/2017     Hyperkeratotic lesions noted to the distal left 3rd digit. This was sharply debrided and no open lesions were noted underneath. Onychomycosis x 10.   DP and PT pulses 1/4 BL. CRT 1 second. Absent pedal hair.  Gross and protective sensations are intact BL.  Hammertoes to all lesser digits. Hallux malleus BL. Equinus BL. Pes planus. tylo    Right hallux nail was completely macerated and lytic. This was sharply removed. No deep extension into the eponychium. Odor decreased with cleansing No s/s of infection noted.           Assessment: Clarke is a 68 YO male with DM2 and neuropathy.  Ulcerations are all healed.  Onychomycosis  Tyloma.      Plan:   - Pt seen and evaluated  - Tylomas debrided x 2.  - Nails debrided x 10.      - No open lesions.   - He does have a crevice on the right hallux nail and the left 2nd digit plantarly. Use interdry to the areas and change daily.   - Has not had an A1c in some time. Will get this.   - Pt to return to clinic in 9 weeks.     Again, thank you for  allowing me to participate in the care of your patient.      Sincerely,    Jesús Lagos DPM

## 2020-07-09 NOTE — NURSING NOTE
Chief Complaint   Patient presents with     Wound Check     Pt here today for wound check, bilateral foot wounds.        There were no vitals filed for this visit.    There is no height or weight on file to calculate BMI.      WOUND EVALUATION: Bilateral foot wounds.    Lio Zhao LPN

## 2020-07-23 ENCOUNTER — VIRTUAL VISIT (OUTPATIENT)
Dept: PSYCHOLOGY | Facility: CLINIC | Age: 70
End: 2020-07-23
Payer: COMMERCIAL

## 2020-07-23 DIAGNOSIS — F33.1 MODERATE EPISODE OF RECURRENT MAJOR DEPRESSIVE DISORDER (H): Primary | ICD-10-CM

## 2020-07-23 DIAGNOSIS — F41.1 GAD (GENERALIZED ANXIETY DISORDER): ICD-10-CM

## 2020-07-27 NOTE — PROGRESS NOTES
"Behavioral Health Progress Note    Client Legal Name: Clarke Moreira   Client Preferred Name: Clarke   Service Type: Individual  Length of Visit: 9:04-9:44  Attendees: patient     The following statement was read to the patient at the outset of this visit:     \"We have found that certain health care needs can be provided without the need for a face to face visit.  This service lets us provide the care you need with a phone conversation. I will have full access to your [facility] medical record during this entire phone call.   I will be taking notes for your medical record.  Since this is like an office visit, we will bill your insurance company for this service. There are potential benefits and risks of telephone visits (e.g. limits to patient confidentiality) that differ from in-person visits.?  Confidentiality still applies for telephone services, and nobody will record the visit.  It is important to be in a quiet, private space that is free of distractions (including cell phone or other devices) during the visit.??If during the course of the call I believe a telephone visit is not appropriate, you will not be charged for this service.\"     Consent has been obtained for this service by care team member: Yes     Emergency contact: Janice Dubon Emergency Room: [place]  Location at time of call: home      Identifying Information and Presenting Problem:    The patient is a 70 year old American male who I last saw in clinic in October 2019.  We have had some phone visits since the start of COVID.  Clarke reports feeling much more comfortable in all of his appointments when they are done via telephone than when he is in person.  In person appointments with all providers have always caused significant distress due to his desire to be perceived in a particular way.  On the telephone, he feels as if he can better articulate his experience and what he is thinking as he feels less need to project a " particular image to those that are caring for him.     Treatment Objective(s) Addressed in This Session:               Clarke will practice a healthy daily discipline of diet and exercise.    Clarke will make and keep appointments with appropriate time limits to reduce disabling anxiety about leaving the house.   Progress on / Status of Treatment Objective(s) / Homework:  Ongoing symptoms of depression and anxiety, but sxs have remained stable.      PHQ-9 SCORE 8/27/2019 9/3/2019 1/6/2020   PHQ-9 Total Score - - -   PHQ-9 Total Score 23 25 21       CHRIS-7 SCORE 12/11/2018 5/10/2019 9/3/2019   Total Score - - -   Total Score 20 13 19     Topics Discussed/Interventions Provided:  Health:  Ongoing health concerns. Believes he is doing what he is able to care for himself.  Processed frustration and remorse he continues to feel that he didn't take better care of himself in the past.  Feels as if he doesn't heal as fast as he used to and that he feels pains/injuries more acutely now.  Discussed focusing on what he is able to do now at this point in his life.    Internet:  Apartment building is now only going to accept rent via online payment.  Session primarily focused on providing encouragement, positive reinforcement, and problem solving as Clarke has been working on finding a device such as laptop or tablet that he can use to do online work, as well as finding affordable internet that may be available to him.  This has been an exercise in perseverance as Clarke frequently feels like giving up as there have been so many barriers.  Discussed his reasons for continuing to try to get this set up, as well as what things could look like for him after these services are set up.  Clarke is most looking forward to feeling more independent in getting his groceries again as he will be able to order them from [employer].  He will be able to get the foods he would like and he will not have to rely on others.     Assessment: The  patient wasengaged in the conversation today via phone.      Mental Status: Clarke sounded alert and oriented.  Speech was of normal rate and rhythm. Mood was described as OK - low mood continues, but also feels encouraged by the progress he has made setting up the internet service.  Thought processes were logical and coherent.  Thought content was WNL with no evidence of psychotic or paranoid features.  Reports ongoing suicidal thoughts, but denies any plan or any intent to do anything to harm himself.  Memory appeared grossly intact. Insight and judgment appeared good and patient exhibited good impulse control during the appointment.     Does the patient appear to be at imminent risk of harm to self/others at this time? No    The session was necessary to identify and reframe negative thoughts that contribute to depression and anxiety, as well as provide encouragement for his perseverance in setting up internet access and getting a device he can use so he can function more independently. Symptoms of depression and anxiety have continued to interfere with his ability to function at home and cause distress that can interfere with his ability to care for himself.  Ongoing psychotherapy is necessary to provide counseling and provide support.    Diagnosis (DSM-5):  Major Depression, recurrent, moderate  Generalized Anxiety Disorder  R/o persistent depressive disorder    Plan:  1. Return 8/6 at 8:20  2. Continue to follow with Dr. Sanchez for medications   3. Dx update at future appt.    NOTE: Treatment plan updated needed on 8/12/20.  Diagnostic assessment update due 10/15/19.

## 2020-07-31 ENCOUNTER — TELEPHONE (OUTPATIENT)
Dept: OPHTHALMOLOGY | Facility: CLINIC | Age: 70
End: 2020-07-31

## 2020-08-04 ENCOUNTER — TELEPHONE (OUTPATIENT)
Dept: OPHTHALMOLOGY | Facility: CLINIC | Age: 70
End: 2020-08-04

## 2020-08-06 ENCOUNTER — VIRTUAL VISIT (OUTPATIENT)
Dept: PSYCHOLOGY | Facility: CLINIC | Age: 70
End: 2020-08-06
Payer: COMMERCIAL

## 2020-08-06 DIAGNOSIS — F41.1 GAD (GENERALIZED ANXIETY DISORDER): ICD-10-CM

## 2020-08-06 DIAGNOSIS — F33.1 MODERATE EPISODE OF RECURRENT MAJOR DEPRESSIVE DISORDER (H): Primary | ICD-10-CM

## 2020-08-06 NOTE — PROGRESS NOTES
"Behavioral Health Progress Note    Client Legal Name: Clarke Moreira   Client Preferred Name: Clarke   Service Type: Individual  Length of Visit: 8:20- 9 (40 minutes)  Attendees: patient     The following statement was read to the patient at the outset of this visit:     \"We have found that certain health care needs can be provided without the need for a face to face visit.  This service lets us provide the care you need with a phone conversation. I will have full access to your [facility] medical record during this entire phone call.   I will be taking notes for your medical record.  Since this is like an office visit, we will bill your insurance company for this service. There are potential benefits and risks of telephone visits (e.g. limits to patient confidentiality) that differ from in-person visits.?  Confidentiality still applies for telephone services, and nobody will record the visit.  It is important to be in a quiet, private space that is free of distractions (including cell phone or other devices) during the visit.??If during the course of the call I believe a telephone visit is not appropriate, you will not be charged for this service.\"     Consent has been obtained for this service by care team member: Yes     Emergency contact: Janice Dubon Emergency Room: [place]  Location at time of call: home      Identifying Information and Presenting Problem:    The patient is a 70 year old American male who I last saw in clinic in October 2019.  We have had some phone visits since the start of COVID.  Clarke reports feeling much more comfortable in all of his appointments when they are done via telephone than when he is in person.  In person appointments with all providers have always caused significant distress due to his desire to be perceived in a particular way.  On the telephone, he feels as if he can better articulate his experience and what he is thinking as he feels less need to " project a particular image to those that are caring for him.     Treatment Objective(s) Addressed in This Session:               Clarke will practice a healthy daily discipline of diet and exercise.    Clarke will make and keep appointments with appropriate time limits to reduce disabling anxiety about    leaving the house.   Progress on / Status of Treatment Objective(s) / Homework:    Ongoing depression and anxiety symptoms    PHQ-9 SCORE 8/27/2019 9/3/2019 1/6/2020   PHQ-9 Total Score - - -   PHQ-9 Total Score 23 25 21       CHRIS-7 SCORE 12/11/2018 5/10/2019 9/3/2019   Total Score - - -   Total Score 20 13 19     Topics Discussed/Interventions Provided:  Clarke shares that he has had a difficult few days.  He has been trying to look into resources to get a computer or ipad for his use at home and not having much luck.  His friend Janice gave him an ipad that her  recently replaced. He had the appt for [employer] to come out and set up [employer]. He Has been trying to get the ipad set up.  Spent lots of hours trying to do it and it's been frustrating. Starts to get anxious/worried that he will respond like the phone he ordered and just send it back.  Only thing keeping him from doing that is worries that he will offend Janice. Clarke also worries about how he will feel about himself if he does send it back.      Session focused on taking this one step at a time. Provided education on a growth mindset vs. A fixed mindset. Dicussed that he has not used technology for many many years so the fact that he is having trouble in the first few days of having an ipad is not an indicator that he will never figure it out.  Identified all the steps he has taken to get this to place where he is at now.  Encouraged him to see this as a learning curve and commit to trying to do some things on the ipad for 20-30 minutes/day and then just putting it away.  This will increase his comfort without the level of frustration he was  experiencing. Problem solved how me might learn more about using the ipad including if the library is offering classes or through community ed.  Was able to provide some information on how to set up his [employer] account on the ipad so he can get groceries.      Assessment: The patient wasengaged in the conversation today via phone.      Mental Status: Clarke sounded alert and oriented.  Speech was of normal rate and rhythm. Mood was described as anxious.  Affect was appropriate to thought content.  Thought processes were logical and coherent.  Thought content was WNL with no evidence of psychotic or paranoid features.  Reports ongoing suicidal thoughts, but denies any plan or any intent to do anything to harm himself.  Memory appeared grossly intact. Insight and judgment appeared good and patient exhibited good impulse control during the appointment.     Does the patient appear to be at imminent risk of harm to self/others at this time? No    The session was necessary to identify gains he has made in terms of learning a new skill and making himself vulnerable while doing so, as well as Providing information on growth mindset vs. A fixed mindset. Symptoms of depression and anxiety have continued to interfere with his ability to function at home and cause distress that can interfere with his ability to care for himself.  Ongoing psychotherapy is necessary to provide counseling and provide support.    Diagnosis (DSM-5):  Major Depression, recurrent, moderate  Generalized Anxiety Disorder  R/o persistent depressive disorder    Plan:  1. Return 8/20 at 9am  2. Continue to follow with Dr. Sanchez for medications   3. Dx update at future appt.  4. Mailed him so more info on growth mindset.    NOTE: Treatment plan updated needed on 8/12/20.  Diagnostic assessment update due 10/15/19.

## 2020-08-20 ENCOUNTER — VIRTUAL VISIT (OUTPATIENT)
Dept: PSYCHOLOGY | Facility: CLINIC | Age: 70
End: 2020-08-20
Payer: COMMERCIAL

## 2020-08-20 DIAGNOSIS — F41.1 GAD (GENERALIZED ANXIETY DISORDER): ICD-10-CM

## 2020-08-20 DIAGNOSIS — F33.1 MODERATE EPISODE OF RECURRENT MAJOR DEPRESSIVE DISORDER (H): Primary | ICD-10-CM

## 2020-08-20 NOTE — PROGRESS NOTES
"Behavioral Health Progress Note    Client Legal Name: Clarke Moreira   Client Preferred Name: Clarke   Service Type: Individual  Length of Visit: 9:08-9:48 (40 minutes)  Attendees: patient     The following statement was read to the patient at the outset of this visit:     \"We have found that certain health care needs can be provided without the need for a face to face visit.  This service lets us provide the care you need with a phone conversation. I will have full access to your [facility] medical record during this entire phone call.   I will be taking notes for your medical record.  Since this is like an office visit, we will bill your insurance company for this service. There are potential benefits and risks of telephone visits (e.g. limits to patient confidentiality) that differ from in-person visits.?  Confidentiality still applies for telephone services, and nobody will record the visit.  It is important to be in a quiet, private space that is free of distractions (including cell phone or other devices) during the visit.??If during the course of the call I believe a telephone visit is not appropriate, you will not be charged for this service.\"     Consent has been obtained for this service by care team member: Yes     Emergency contact: Janice Dubon Emergency Room: [place]  Location at time of call: home      Identifying Information and Presenting Problem:    The patient is a 70 year old American male who I last saw in clinic in October 2019.  We have had some phone visits since the start of COVID.  Clarke reports feeling much more comfortable in all of his appointments when they are done via telephone than when he is in person.  In person appointments with all providers have always caused significant distress due to his desire to be perceived in a particular way.  On the telephone, he feels as if he can better articulate his experience and what he is thinking as he feels less need to " project a particular image to those that are caring for him.     Treatment Objective(s) Addressed in This Session:               Clarke will practice a healthy daily discipline of diet and exercise.    Clarke will make and keep appointments with appropriate time limits to reduce disabling anxiety about    leaving the house.   Progress on / Status of Treatment Objective(s) / Homework:    Feeling a little more down and melancholy today.    PHQ-9 SCORE 8/27/2019 9/3/2019 1/6/2020   PHQ-9 Total Score - - -   PHQ-9 Total Score 23 25 21       CHRIS-7 SCORE 12/11/2018 5/10/2019 9/3/2019   Total Score - - -   Total Score 20 13 19     Topics Discussed/Interventions Provided:  Clarke was able to get the ipad working so he could order groceries from [facility] and it worked!  The person from [facility] came to deliver his groceries and was going to leave them in the lobby. Clarke decided to ask if she could bring them up to his apartment as he was physically having a tough day.  The person didn't come for awhile, so Clarke went downstairs and the  was there in the lobby looking puzzled.  She apparently couldn't find the elevator.  Clarke thanked her for packing them all up on a cart and said he could take them up.  When getting into the elevator a wheel stuck and another person standing there gave the wheel a push and this led to Clarke losing his balance and the groceries tipping over.  lCarke got up to his apt and got all of the groceries unpacked.  When he brought the cart back down, he decided to go outside and sit on a bench in the yard for a bit to rest and because he was feeling very upset by the whole sequence.  The time on the bench felt very good and healing for himself.  He has not placed another order since then because he is scared it will be another experience like that one.  Highlighted several pieces of this story to Clarke, including the fact that he actually asked for what he needed by inquiring  if the [employer] person could come upstairs, when she looked puzzled, he had empathy for her rather than getting angry, and at the end of it all he took some time to care for himself rather than going right to ruminating about how it all went wrong.  Discussed that all of these steps demonstrate the how Clarke is changing the narrative about his life that typically frustrates him so much (I'm not independent, I never ask for what I need, I never follow through on anything).  Discussed that the narrative isn't permanent and already written, it is always being written each day.  Emphasized the importance of doing another grocery order.  Also encouraged Clarke to make an appt with his PCP to follow up on his chronic health conditions.     Assessment: The patient wasengaged in the conversation today via phone.      Mental Status: Clarke sounded alert and oriented.  Speech was of normal rate and rhythm. Mood was described as down and melancholy.  Affect was appropriate to thought content.  Thought processes were logical and coherent.  Thought content was WNL with no evidence of psychotic or paranoid features.  Reports ongoing suicidal thoughts, but denies any plan or any intent to do anything to harm himself.  Memory appeared grossly intact. Insight and judgment appeared good and patient exhibited good impulse control during the appointment.     Does the patient appear to be at imminent risk of harm to self/others at this time? No    The session was necessary to continue to identify how the steps Clarke is taking are changing the negative narrative that he holds about himself.  Clarke is continuing to persevere in working with this new technology despite all the challenges he has experienced.  Symptoms of depression and anxiety have continued to interfere with his ability to function at home and cause distress that can interfere with his ability to care for himself.  Ongoing psychotherapy is necessary to provide  counseling and provide support.    Diagnosis (DSM-5):  Major Depression, recurrent, moderate  Generalized Anxiety Disorder  R/o persistent depressive disorder    Plan:  1. Return 8/27 at 9am  2. Continue to follow with Dr. Sanchez for medications and make an appt with Dr. Sanchez to discuss chronic health conditions.  3. Dx update at future appt.  4. Order groceries!    NOTE: Treatment plan updated needed on 8/12/20.  Diagnostic assessment update due 10/15/19.

## 2020-08-27 ENCOUNTER — VIRTUAL VISIT (OUTPATIENT)
Dept: PSYCHOLOGY | Facility: CLINIC | Age: 70
End: 2020-08-27
Payer: COMMERCIAL

## 2020-08-27 DIAGNOSIS — F41.1 GAD (GENERALIZED ANXIETY DISORDER): ICD-10-CM

## 2020-08-27 DIAGNOSIS — F33.1 MODERATE EPISODE OF RECURRENT MAJOR DEPRESSIVE DISORDER (H): Primary | ICD-10-CM

## 2020-09-17 ENCOUNTER — VIRTUAL VISIT (OUTPATIENT)
Dept: PSYCHOLOGY | Facility: CLINIC | Age: 70
End: 2020-09-17
Payer: COMMERCIAL

## 2020-09-17 DIAGNOSIS — F41.1 GAD (GENERALIZED ANXIETY DISORDER): ICD-10-CM

## 2020-09-17 DIAGNOSIS — F33.1 MODERATE EPISODE OF RECURRENT MAJOR DEPRESSIVE DISORDER (H): Primary | ICD-10-CM

## 2020-09-17 NOTE — PROGRESS NOTES
"Behavioral Health Progress Note    Client Legal Name: Clarke Moreira   Client Preferred Name: Clarke   Service Type: Individual  Length of Visit: 9:00- 9:43 (43 minutes)  Attendees: patient     The following statement was read to the patient at the outset of this visit:     \"We have found that certain health care needs can be provided without the need for a face to face visit.  This service lets us provide the care you need with a phone conversation. I will have full access to your [facility] medical record during this entire phone call.   I will be taking notes for your medical record.  Since this is like an office visit, we will bill your insurance company for this service. There are potential benefits and risks of telephone visits (e.g. limits to patient confidentiality) that differ from in-person visits.?  Confidentiality still applies for telephone services, and nobody will record the visit.  It is important to be in a quiet, private space that is free of distractions (including cell phone or other devices) during the visit.??If during the course of the call I believe a telephone visit is not appropriate, you will not be charged for this service.\"     Consent has been obtained for this service by care team member: Yes     Emergency contact: Janice Dubon Emergency Room: [place]  Location at time of call: home      Identifying Information and Presenting Problem:    The patient is a 70 year old American male who I last saw in clinic in October 2019.  We have had some phone visits since the start of COVID.  Clarke reports feeling much more comfortable in all of his appointments when they are done via telephone than when he is in person.  In person appointments with all providers have always caused significant distress due to his desire to be perceived in a particular way.  On the telephone, he feels as if he can better articulate his experience and what he is thinking as he feels less need to " "project a particular image to those that are caring for him.     Treatment Objective(s) Addressed in This Session:               Clarke will practice a healthy daily discipline of diet and exercise.    Clarke will make and keep appointments with appropriate time limits to reduce disabling anxiety about leaving the house.   Progress on / Status of Treatment Objective(s) / Homework:  Increased depression and negative self-talk these last couple of weeks.    PHQ-9 SCORE 8/27/2019 9/3/2019 1/6/2020   PHQ-9 Total Score - - -   PHQ-9 Total Score 23 25 21       CHRIS-7 SCORE 12/11/2018 5/10/2019 9/3/2019   Total Score - - -   Total Score 20 13 19     Topics Discussed/Interventions Provided:  Clarke describes feeling less buoyant and more depressed- states he feels like he ran into an \"emotional monolith.\"  As a result he finds himself eating more sugar and carbs and less fresh foods.  Feels emotionally heavier and weaker.  Old mantra of \"I can't\" is coming up more frequently.  Feet/toes are hurting all the time - the intensity of the pain makes activity difficulty much of the time.    Session focused on behavioral activation.  Clarke reports that he goes out of the apartment about one time a week to get his mail.  Provided education on behavioral education and the rationale.  Explored what might be some options for him to leave the house.  Identified that he could go down to mail some bills and/or sit on a bench on the side of the building.  Discussed unhelpful thoughts that may get in the way of leaving the apartment.  He does find that when he goes to do these activities, he feels like he has some purpose.  Goal set of trying to leave apartment 2x/in a week.      Clarke did reschedule appt with Dr. Sanchez.  Hasn't really seen any doctor since pandemic started and feels like seeing one person that day is enough, so I will not stop in to see him when he comes to clinic.      Assessment: The patient wasengaged in the " conversation today via phone.      Mental Status: Clarke sounded alert and oriented.  Speech was of normal rate and rhythm. Mood was described as dysphoric.  Affect was down, but appropriate to stated thoughts and presentation.  Thought processes were logical and coherent.  Thought content was WNL with no evidence of psychotic or paranoid features.  Reports ongoing passive suicidal thoughts, but denies any plan or any intent to do anything to harm himself.  Memory appeared grossly intact. Insight and judgment appeared good and patient exhibited good impulse control during the appointment.     Does the patient appear to be at imminent risk of harm to self/others at this time? No    The session was necessary to provide information on behavioral activation and work to identify some activity goals to increase the number of times Clarke leaves his apartment in a week to help with depressive symptoms.  Symptoms of depression and anxiety have continued to interfere with his ability to function at home and cause distress that can interfere with his ability to care for himself.  Ongoing psychotherapy is necessary to provide counseling and provide support.    Diagnosis (DSM-5):  Major Depression, recurrent, moderate  Generalized Anxiety Disorder  R/o persistent depressive disorder    Plan:  1. Return 10/1 at 8:20  2. Continue to follow with Dr. Sanchez for medications and make an appt with Dr. Sanchez to discuss chronic health conditions - scheduled 10/8  3. Dx update at future appt.      NOTE: Treatment plan updated needed on 8/12/20.  Diagnostic assessment update due 10/15/19.

## 2020-09-21 ENCOUNTER — TELEPHONE (OUTPATIENT)
Dept: PHARMACY | Facility: PHYSICIAN GROUP | Age: 70
End: 2020-09-21

## 2020-09-21 NOTE — TELEPHONE ENCOUNTER
PHARMACY TELEPHONE ENCOUNTER:    Reason: INR reminder       Clarke was called today to review anticoagulation management.  He has missed several months of INR checks.  He is lost to follow-up due to changes in COVID and the completion of his home care nursing.  He continues to take warfarin and has no new adverse effects related to this therapy.  Patient agrees to have an INR completed this week.  He will also follow-up with Dr. Sanchez next month.  At that time may discuss DOAC's or home INR monitoring if returning to clinic for INR labs proves to be too difficult.    Tj Palumbo, Pharm.D.

## 2020-09-30 DIAGNOSIS — K59.00 CONSTIPATION, UNSPECIFIED CONSTIPATION TYPE: ICD-10-CM

## 2020-09-30 RX ORDER — AMOXICILLIN 250 MG
1-2 CAPSULE ORAL 2 TIMES DAILY PRN
Qty: 60 TABLET | Refills: 3 | Status: SHIPPED | OUTPATIENT
Start: 2020-09-30 | End: 2021-01-26

## 2020-09-30 NOTE — TELEPHONE ENCOUNTER
"Request for medication refill: [facility]  Providers if patient needs an appointment and you are willing to give a one month supply please refill for one month and  send a letter/MyChart using \".SMILLIMITEDREFILL\" .smillimited and route chart to \"P SMI \" (Giving one month refill in non controlled medications is strongly recommended before denial)    If refill has been denied, meaning absolutely no refills without visit, please complete the smart phrase \".smirxrefuse\" and route it to the \"P SMI MED REFILLS\"  pool to inform the patient and the pharmacy.    Yajaira Longoria, CMA        "

## 2020-10-01 ENCOUNTER — VIRTUAL VISIT (OUTPATIENT)
Dept: PSYCHOLOGY | Facility: CLINIC | Age: 70
End: 2020-10-01
Payer: COMMERCIAL

## 2020-10-01 DIAGNOSIS — F33.1 MODERATE EPISODE OF RECURRENT MAJOR DEPRESSIVE DISORDER (H): Primary | ICD-10-CM

## 2020-10-01 DIAGNOSIS — F41.1 GAD (GENERALIZED ANXIETY DISORDER): ICD-10-CM

## 2020-10-01 PROCEDURE — 90834 PSYTX W PT 45 MINUTES: CPT | Mod: 95 | Performed by: PSYCHOLOGIST

## 2020-10-01 NOTE — PROGRESS NOTES
"Behavioral Health Progress Note    Client Legal Name: Clarke Moreira   Client Preferred Name: Clarke   Service Type: Individual  Length of Visit: 8:20 am - 9:00 (40 minutes)  Attendees: patient     The following statement was read to the patient at the outset of this visit:     \"We have found that certain health care needs can be provided without the need for a face to face visit.  This service lets us provide the care you need with a phone conversation. I will have full access to your [facility] medical record during this entire phone call.   I will be taking notes for your medical record.  Since this is like an office visit, we will bill your insurance company for this service. There are potential benefits and risks of telephone visits (e.g. limits to patient confidentiality) that differ from in-person visits.?  Confidentiality still applies for telephone services, and nobody will record the visit.  It is important to be in a quiet, private space that is free of distractions (including cell phone or other devices) during the visit.??If during the course of the call I believe a telephone visit is not appropriate, you will not be charged for this service.\"     Consent has been obtained for this service by care team member: Yes     Emergency contact: Janice Dubon Emergency Room: [place]  Location at time of call: home      Identifying Information and Presenting Problem:    The patient is a 70 year old American male who I last saw in clinic in October 2019.  We have had some phone visits since the start of COVID.  Clarke reports feeling much more comfortable in all of his appointments when they are done via telephone than when he is in person.  In person appointments with all providers have always caused significant distress due to his desire to be perceived in a particular way.  On the telephone, he feels as if he can better articulate his experience and what he is thinking as he feels less " "need to project a particular image to those that are caring for him.     Treatment Objective(s) Addressed in This Session:               Clarke will practice a healthy daily discipline of diet and exercise.    Clarke will make and keep appointments with appropriate time limits to reduce disabling anxiety about leaving the house.   Progress on / Status of Treatment Objective(s) / Homework  Reports a \"large panic attack\" due to the ipad.    PHQ-9 SCORE 8/27/2019 9/3/2019 1/6/2020   PHQ-9 Total Score - - -   PHQ-9 Total Score 23 25 21       CHRIS-7 SCORE 12/11/2018 5/10/2019 9/3/2019   Total Score - - -   Total Score 20 13 19     Topics Discussed/Interventions Provided:  Clarke describes a 4 day panic attack where he was feeling physically ill much of the time, increasingly anxious, and overwhelmed.  He states this is due to the ipad.  He lost his inbox on his email and lost his search engine.  Despite all of these frustrations and set backs, Clarke reached out to a friend for help.  This proved to cause more anxiety than help, so he called [employer] even though he had had a bad experience last time.  The person he reached spent an hour helping him get all of his apps back again and teaching him so things he didn't know about how to use his ipad.  Session focused on how his actions in this situation are different than his typical narrative that whenever things get hard he avoids or retreats.  Explored how he felt reaching out for help and after he received the help vs. How he would have felt if he would have just decided to stop using the ipad.      Social Isolation:  Now that he has email, searched for a friend's email address and sent her an email with encouraging words as she had called him very upset a few weeks ago.  She sent a thank you back to him.  Clarke states \"I remembered that my relationship with her is more important than feeling safe.\"  Spent time focusing on the importance of these words and the " insight they provide.     Assessment: The patient wasengaged in the conversation today via phone.      Mental Status: Clarke sounded alert and oriented.  Speech was of normal rate and rhythm. Mood was described as OK today, had been feeling more anxious.  Affect was down, but appropriate to stated thoughts and presentation.  Thought processes were logical and coherent.  Thought content was WNL, some hopefulness at times, with no evidence of psychotic or paranoid features.  Reports ongoing passive suicidal thoughts, but denies any plan or any intent to do anything to harm himself.  Memory appeared grossly intact. Insight and judgment appeared good and patient exhibited good impulse control during the appointment.     Does the patient appear to be at imminent risk of harm to self/others at this time? No    The session was necessary to provide positive reinforcement for the resilience and perseverance that Clarke has demonstrated to himself through continuing to problem solivng his ipad situation and not giving up.  Symptoms of depression and anxiety have continued to interfere with his ability to function at home and cause distress that can interfere with his ability to care for himself.  Ongoing psychotherapy is necessary to provide counseling and provide support.    Diagnosis (DSM-5):  Major Depression, recurrent, moderate  Generalized Anxiety Disorder  R/o persistent depressive disorder    Plan:  1. Return 10/8 - in person when he is here to see Dr. Sanchez.  2. Continue to follow with Dr. Sanchez for medications and make an appt with Dr. Sanchez to discuss chronic health conditions - scheduled 10/8  3. Dx update at future appt.      NOTE: Treatment plan updated needed on 8/12/20.  Diagnostic assessment update due 10/15/19.

## 2020-10-08 ENCOUNTER — TELEPHONE (OUTPATIENT)
Dept: PSYCHOLOGY | Facility: CLINIC | Age: 70
End: 2020-10-08

## 2020-10-08 NOTE — TELEPHONE ENCOUNTER
"Called Clarke to check-in after receiving message below.  Got very anxious about coming into clinic for a visit.  States he starts to feel \"so small and unworthy\" couldn't follow through on coming in. Hasn't seen a doctor since June or July.  Gets very worried about seeing any providers and how he might be perceived by them, this makes it difficult to follow through on appts. Has done better with f/u on m[facility] visits since started phone visits.  We were planning on having me see him after his appt with Dr. Sanchez today.  Discussed that this may be causing more anxiety so suggested that we uncouple the visits and he just make an appt to see Dr. Sanchez.  I asked if he thought it might be helpful for me to call him the day before his appt so we can address the anxious/fearful thoughts that start to go through his mind to see if that increases probability of keeping the appt.  He was receptive to this idea. He will call next week tomake appt with Dr. Sanchez. Let him know I am out of the office from 10/14-10/16, but would be able to call him the day before most other days.    "

## 2020-10-08 NOTE — TELEPHONE ENCOUNTER
----- Message from Apryl Smith sent at 10/7/2020  4:47 PM CDT -----  Regarding: appt 10/8 at 11:00am  Hi Dr Buenrostro,  I just spoke with Clarke and he mentioned having an appointment with you at 11:00am on 10/8/20. He is coming in for an AWV with Dr Sanchez at 10:40am and expressed concern about having an appt with you so close to the appt with Dr Sanchez. He said that you advised him that it would be fine.    I don't see an appt with you at 11:00am on 10/8.    Do you know what he is referring to?  Thanks!  Apryl

## 2020-10-09 ENCOUNTER — DOCUMENTATION ONLY (OUTPATIENT)
Dept: PSYCHOLOGY | Facility: CLINIC | Age: 70
End: 2020-10-09

## 2020-10-09 NOTE — PROGRESS NOTES
Provide information to  Clarke on [employer] phone  program - either to receive phone calls or to be a volunteer at our next visit.

## 2020-11-05 ENCOUNTER — TELEPHONE (OUTPATIENT)
Dept: PSYCHOLOGY | Facility: CLINIC | Age: 70
End: 2020-11-05

## 2020-11-05 NOTE — TELEPHONE ENCOUNTER
Called Clarke as we discussed at his last appointment to check-in on anxiety prior to his visit with Dr. Sanchez on 11/9.  States he is doing OK right now. Recognizes that anxiety will likely increase as the day comes closer.  Planning to call for medical ride later today.  Discussed his reasons for making this appointment and provided encouragement that he can for his ability to cope with the anxiety and attend the appointment.    He asked if he would be able to get a flu shot and a covid test while he is here. Suggested that he speak to one of our triage nurses about that question. Let him know I would forward my note to them and ask them to give him a call.      Scheduled follow up with me for 11/19 at 9 am phone visit.      Clarke was grateful for the phone call.

## 2020-11-05 NOTE — TELEPHONE ENCOUNTER
"Rn reached out to patient at request of Dr. Buenrostro. Rn informed patient that he could get a flu shot at his appointment with Dr. Sanchez on 11/9/20 and I have added that to his appointment notes.     Patient also wondering if he could get tested for COVID 19 at the appointment as well. Patient states he is asymptomatic and has not been exposed- states \"I rarely leave my home\". Patient concerned because of his history of CHF and other conditions. Rn did advise that in general, we are not testing asymptomatic non-exposed patients at [facility] however, there are some patient priorities/criteria that warrent asymptomatic testing so writer will route to Dr. Sanchez to advise.   Elena Lora RN    "

## 2020-11-08 ENCOUNTER — DOCUMENTATION ONLY (OUTPATIENT)
Dept: FAMILY MEDICINE | Facility: CLINIC | Age: 70
End: 2020-11-08

## 2020-11-09 ENCOUNTER — OFFICE VISIT (OUTPATIENT)
Dept: FAMILY MEDICINE | Facility: CLINIC | Age: 70
End: 2020-11-09
Payer: COMMERCIAL

## 2020-11-09 VITALS
BODY MASS INDEX: 53.84 KG/M2 | RESPIRATION RATE: 18 BRPM | OXYGEN SATURATION: 96 % | TEMPERATURE: 98.8 F | SYSTOLIC BLOOD PRESSURE: 149 MMHG | DIASTOLIC BLOOD PRESSURE: 77 MMHG | HEART RATE: 98 BPM | WEIGHT: 315 LBS

## 2020-11-09 DIAGNOSIS — Z23 NEED FOR PROPHYLACTIC VACCINATION AND INOCULATION AGAINST INFLUENZA: ICD-10-CM

## 2020-11-09 DIAGNOSIS — E66.01 MORBID (SEVERE) OBESITY DUE TO EXCESS CALORIES (H): ICD-10-CM

## 2020-11-09 DIAGNOSIS — G47.33 OSA (OBSTRUCTIVE SLEEP APNEA): ICD-10-CM

## 2020-11-09 DIAGNOSIS — E11.65 TYPE 2 DIABETES MELLITUS WITH HYPERGLYCEMIA, WITHOUT LONG-TERM CURRENT USE OF INSULIN (H): ICD-10-CM

## 2020-11-09 DIAGNOSIS — J96.12 CHRONIC HYPERCAPNIC RESPIRATORY FAILURE (H): ICD-10-CM

## 2020-11-09 DIAGNOSIS — M86.9 OSTEOMYELITIS OF RIGHT FOOT, UNSPECIFIED TYPE (H): ICD-10-CM

## 2020-11-09 DIAGNOSIS — F32.2 SEVERE MAJOR DEPRESSION (H): ICD-10-CM

## 2020-11-09 DIAGNOSIS — I73.9 PAD (PERIPHERAL ARTERY DISEASE) (H): ICD-10-CM

## 2020-11-09 DIAGNOSIS — Z00.00 MEDICARE ANNUAL WELLNESS VISIT, INITIAL: Primary | ICD-10-CM

## 2020-11-09 DIAGNOSIS — I50.32 CHRONIC HEART FAILURE WITH PRESERVED EJECTION FRACTION (H): ICD-10-CM

## 2020-11-09 DIAGNOSIS — L82.1 SEBORRHEIC KERATOSES: ICD-10-CM

## 2020-11-09 DIAGNOSIS — G63 POLYNEUROPATHY ASSOCIATED WITH UNDERLYING DISEASE (H): ICD-10-CM

## 2020-11-09 DIAGNOSIS — I48.20 CHRONIC ATRIAL FIBRILLATION (H): ICD-10-CM

## 2020-11-09 LAB
ALBUMIN SERPL-MCNC: 4.2 MG/DL (ref 3.5–4.7)
ALP SERPL-CCNC: 86.4 U/L (ref 31.7–110.7)
ALT SERPL-CCNC: 12.9 U/L (ref 0–45)
AST SERPL-CCNC: 19.3 U/L (ref 0–55)
BILIRUB SERPL-MCNC: 0.8 MG/DL (ref 0.2–1.3)
BUN SERPL-MCNC: 19.6 MG/DL (ref 7–21)
CALCIUM SERPL-MCNC: 9.5 MG/DL (ref 8.5–10.1)
CHLORIDE SERPLBLD-SCNC: 99.7 MMOL/L (ref 98–110)
CHOLEST SERPL-MCNC: 143.6 MG/DL (ref 0–200)
CHOLEST/HDLC SERPL: 3.9 {RATIO} (ref 0–5)
CO2 SERPL-SCNC: 24.5 MMOL/L (ref 20–32)
CREAT SERPL-MCNC: 1.1 MG/DL (ref 0.7–1.3)
CREAT UR-MCNC: 103 MG/DL
ERYTHROCYTE [SEDIMENTATION RATE] IN BLOOD: 19 MM/HR (ref 0–20)
GFR SERPL CREATININE-BSD FRML MDRD: 67.6 ML/MIN/1.7 M2
GLUCOSE SERPL-MCNC: 113.1 MG'DL (ref 70–99)
HBA1C MFR BLD: 5.9 % (ref 4.1–5.7)
HCT VFR BLD AUTO: 49 % (ref 40–53)
HDLC SERPL-MCNC: 37.2 MG/DL
HEMOGLOBIN: 15.1 G/DL (ref 13.3–17.7)
INR PPP: 1.9
LDLC SERPL CALC-MCNC: 85 MG/DL (ref 0–129)
MCH RBC QN AUTO: 29.7 PG (ref 26.5–35)
MCHC RBC AUTO-ENTMCNC: 30.8 G/DL (ref 32–36)
MCV RBC AUTO: 96.3 FL (ref 78–100)
MICROALBUMIN UR-MCNC: <5 MG/L
MICROALBUMIN/CREAT UR: NORMAL MG/G CR (ref 0–17)
NT-PROBNP SERPL-MCNC: 841 PG/ML (ref 0–125)
PLATELET # BLD AUTO: 221 K/UL (ref 150–450)
POTASSIUM SERPL-SCNC: 4 MMOL/L (ref 3.3–4.5)
PROT SERPL-MCNC: 7.4 G/DL (ref 6.8–8.8)
RBC # BLD AUTO: 5.09 M/UL (ref 4.4–5.9)
SODIUM SERPL-SCNC: 135.9 MMOL/L (ref 132.6–141.4)
TRIGL SERPL-MCNC: 108.2 MG/DL (ref 0–150)
TSH SERPL DL<=0.005 MIU/L-ACNC: 2.69 MU/L (ref 0.4–4)
VLDL CHOLESTEROL: 21.6 MG/DL (ref 7–32)
WBC # BLD AUTO: 8.9 K/UL (ref 4–11)

## 2020-11-09 PROCEDURE — 36415 COLL VENOUS BLD VENIPUNCTURE: CPT | Performed by: FAMILY MEDICINE

## 2020-11-09 PROCEDURE — 82043 UR ALBUMIN QUANTITATIVE: CPT | Performed by: FAMILY MEDICINE

## 2020-11-09 PROCEDURE — 99214 OFFICE O/P EST MOD 30 MIN: CPT | Mod: 25 | Performed by: FAMILY MEDICINE

## 2020-11-09 PROCEDURE — 83036 HEMOGLOBIN GLYCOSYLATED A1C: CPT | Performed by: FAMILY MEDICINE

## 2020-11-09 PROCEDURE — 84443 ASSAY THYROID STIM HORMONE: CPT | Performed by: FAMILY MEDICINE

## 2020-11-09 PROCEDURE — G0008 ADMIN INFLUENZA VIRUS VAC: HCPCS | Performed by: FAMILY MEDICINE

## 2020-11-09 PROCEDURE — 90662 IIV NO PRSV INCREASED AG IM: CPT | Performed by: FAMILY MEDICINE

## 2020-11-09 PROCEDURE — 85651 RBC SED RATE NONAUTOMATED: CPT | Performed by: FAMILY MEDICINE

## 2020-11-09 PROCEDURE — 85027 COMPLETE CBC AUTOMATED: CPT | Performed by: FAMILY MEDICINE

## 2020-11-09 PROCEDURE — 85610 PROTHROMBIN TIME: CPT | Performed by: FAMILY MEDICINE

## 2020-11-09 PROCEDURE — 80053 COMPREHEN METABOLIC PANEL: CPT | Performed by: FAMILY MEDICINE

## 2020-11-09 PROCEDURE — G0439 PPPS, SUBSEQ VISIT: HCPCS | Mod: 25 | Performed by: FAMILY MEDICINE

## 2020-11-09 PROCEDURE — 83880 ASSAY OF NATRIURETIC PEPTIDE: CPT | Performed by: FAMILY MEDICINE

## 2020-11-09 PROCEDURE — 86803 HEPATITIS C AB TEST: CPT | Performed by: FAMILY MEDICINE

## 2020-11-09 PROCEDURE — 80061 LIPID PANEL: CPT | Performed by: FAMILY MEDICINE

## 2020-11-09 RX ORDER — MULTIVIT WITH MINERALS/LUTEIN
1000 TABLET ORAL DAILY
COMMUNITY

## 2020-11-09 ASSESSMENT — PATIENT HEALTH QUESTIONNAIRE - PHQ9: SUM OF ALL RESPONSES TO PHQ QUESTIONS 1-9: 22

## 2020-11-09 NOTE — LETTER
November 9, 2020      Clarke Moreira  [address]  [address]  [place] [id number]        Dear Clarke,    Thank you for getting your care at [facility]. Please see below for your test results.  All your labs are pretty darn good!!  Of note is the Terminal Pro-BNP (for heart failure) is higher than last time (651) but better than in the past    Resulted Orders   Comprehensive Metabolic Panel ([facility])   Result Value Ref Range    Calcium 9.5 8.5 - 10.1 mg/dL    Chloride 99.7 98.0 - 110.0 mmol/L    Carbon Dioxide 24.5 20.0 - 32.0 mmol/L    Creatinine 1.1 0.7 - 1.3 mg/dL    Glucose 113.1 (H) 70.0 - 99.0 mg'dL    Potassium 4.0 3.3 - 4.5 mmol/L    Sodium 135.9 132.6 - 141.4 mmol/L    Protein Total 7.4 6.8 - 8.8 g/dL    GFR Estimate 67.6 >60.0 mL/min/1.7 m2    GFR Estimate If Black 81.8 >60.0 mL/min/1.7 m2    Albumin 4.2 3.5 - 4.7 mg/dL    Alkaline Phosphatase 86.4 31.7 - 110.7 U/L    ALT 12.9 0.0 - 45.0 U/L    AST 19.3 0.0 - 55.0 U/L    Bilirubin Total 0.8 0.2 - 1.3 mg/dL    Urea Nitrogen 19.6 7.0 - 21.0 mg/dL   Lipid Cascade ([facility])   Result Value Ref Range    Cholesterol 143.6 0.0 - 200.0 mg/dL    Cholesterol/HDL Ratio 3.9 0.0 - 5.0    HDL Cholesterol 37.2 (L) >40.0 mg/dL    Triglycerides 108.2 0.0 - 150.0 mg/dL    VLDL Cholesterol 21.6 7.0 - 32.0 mg/dL    LDL Cholesterol Calculated 85 0 - 129 mg/dL   CBC with Plt ([facility])   Result Value Ref Range    WBC 8.9 4.0 - 11.0 K/uL    RBC 5.09 4.40 - 5.90 M/uL    Hemoglobin 15.1 13.3 - 17.7 g/dL    Hematocrit 49.0 40.0 - 53.0 %    MCV 96.3 78.0 - 100.0 fL    MCH 29.7 26.5 - 35.0 pg    MCHC 30.8 (L) 32.0 - 36.0 g/dL    Platelets 221.0 150.0 - 450.0 K/uL   TSH with free T4 reflex   Result Value Ref Range    TSH 2.69 0.40 - 4.00 mU/L   Erythrocyte Sedimentation Rate ([facility])   Result Value Ref Range    Sed Rate 19 0 - 20 mm/hr   INR ([facility])   Result Value Ref Range    INR 1.9       Comment:      Adult Normal Range: 0.90 - 1.10  Therapeutic Range: 2.0 -  3.5     N terminal pro BNP outpatient   Result Value Ref Range    N-Terminal Pro Bnp 841 (H) 0 - 125 pg/mL      Comment:         Reference range shown and results flagged as abnormal are for the outpatient,   non acute settings. Establishing a baseline value for each individual patient   is useful for follow-up.  Suggested inpatient cut points for confirming diagnosis of CHF in an acute   setting are:   >450 pg/mL (age 18 to less than 50)   >900 pg/mL (age 50 to less than 75)   >1800 pg/mL (75 yrs and older)  An inpatient or emergency department NT-proPBNP <300 pg/mL effectively rules   out acute CHF, with 99% negative predictive value.      Albumin Random Urine Quantitative with Creat Ratio   Result Value Ref Range    Creatinine Urine 103 mg/dL    Albumin Urine mg/L <5 mg/L    Albumin Urine mg/g Cr Unable to calculate due to low value 0 - 17 mg/g Cr   Hemoglobin A1c ([facility])   Result Value Ref Range    Hemoglobin A1C 5.9 (H) 4.1 - 5.7 %           Sincerely,    Matthias Sanchez MD

## 2020-11-09 NOTE — PROGRESS NOTES
Scheduling:  If you had an XRay/CT/Ultrasound/MRI ordered the number is [phone] to schedule or change your radiology appointment.   [facility] MEDICARE PERSONAL PREVENTIVE SERVICES PLAN - SERVICES  For Men   Review these tests with your doctor then decide which ones you want and take this page home for your reference     Immunization History   Administered Date(s) Administered     Influenza (High Dose) 3 valent vaccine 10/30/2019     Influenza (IIV3) PF 12/15/2005, 03/08/2007, 01/25/2008, 10/01/2010, 12/02/2011, 09/06/2017     Mantoux Tuberculin Skin Test 10/09/2017, 03/23/2019     Pneumo Conj 13-V (2010&after) 11/27/2017     Pneumococcal 23 valent 03/24/2019     TDAP Vaccine (Boostrix) 11/27/2017     Tdap (Adacel,Boostrix) 03/08/2007                                                       IMMUNIZATIONS Description Recommend today?     Influenza (flu shot) Helps to prevent flu; you should get it every year offer   PCV 13 Prevents pneumonia; given once No; is up to date.   PPSV 23 Prevents pneumonia; given once No; is up to date.   Tetanus Prevents tetanus; need every 10 years No; is up to date.   Herpes Zoster (shingles) Prevents or lessens symptoms from shingles; given once.  Offer  If you want this vaccine please go to a pharmacy to receive Shinrix   Hepatitis B  If you have any of the following risk factors you should be immunized for hepatitis B: severe kidney disease, people who live in the same house as a carrier of Hepatitis B virus, people who live in  institutions (e.g. nursing homes or group homes), homosexual men, patients with hemophilia who received Factor VIII or IX concentrates, abusers of illicit injectable drugs   9/2019  HBsAg - neg  HBsAb - nonreactive    Candidate for  vaccine     Health Maintenance   Topic Date Due     HF ACTION PLAN  1950     DEPRESSION ACTION PLAN  1950     HEPATITIS B IMMUNIZATION (1 of 3 - Risk 3-dose series) 05/24/1969     ZOSTER IMMUNIZATION (1 of 2)  05/24/2000     MICROALBUMIN  11/10/2018     EYE EXAM  06/12/2019     LIPID  08/28/2019     COLORECTAL CANCER SCREENING  09/09/2019     PHQ-9  04/06/2020     BMP  05/05/2020     DIABETIC FOOT EXAM  05/16/2020     INFLUENZA VACCINE (1) 09/01/2020     MEDICARE ANNUAL WELLNESS VISIT  09/03/2020     FALL RISK ASSESSMENT  09/03/2020     A1C  10/09/2020     ALT  11/05/2020     CBC  11/05/2020     ADVANCE CARE PLANNING  09/03/2024     DTAP/TDAP/TD IMMUNIZATION (3 - Td) 11/27/2027     TSH W/FREE T4 REFLEX  Completed     HEPATITIS C SCREENING  Completed     Pneumococcal Vaccine: 65+ Years  Completed     AORTIC ANEURYSM SCREENING (SYSTEM ASSIGNED)  Completed     Pneumococcal Vaccine: Pediatrics (0 to 5 Years) and At-Risk Patients (6 to 64 Years)  Aged Out     IPV IMMUNIZATION  Aged Out     MENINGITIS IMMUNIZATION  Aged Out         SCREENING TESTS     Description   Year of Last Screening   Recommended Today?   Heart disease screening blood tests    Cholesterol level Reducing cholesterol can reduce your risk of heart attacks by 25%.  Screening is recommended yearly if you are at risk of heart disease otherwise every 4-5 years  ordered     Diabetes screening tests    Hemoglobin A1c blood test   Finding and treating diabetes early can reduce complications.  Screening recommended/covered yearly if you have high blood pressure, high cholesterol, obesity (BMI >30), or a history of high blood glucose tests; or 2 of the following: family history of diabetes, overweight (BMI >25 but <30), age 65 years or older, and a history of diabetes of pregnancy or gave birth to baby weighing more than 9 lbs.    Done July 2020  A1c=5.9   Hepatitis B screening Finding hepatitis B early can reduce complications.  Screening is recommended for persons with selected risk factors.  Normal 9/2019   Hepatitis C screening Finding hepatitis C early can reduce complications.  Screening is recommended for all persons born from 1945 through 1965 and for those  with selected other risk factors.   ordered   HIV screening Finding HIV early can reduce complications.  Screening is recommended for persons with risk factors for HIV infection.  discuss   Glaucoma screening Early detection of glaucoma can prevent blindness.   Please talk to your eye doctor about this.       SCREENING TESTS     Description   Year of Last Screening   Recommended Today?   Colorectal cancer screening    Fecal occult blood test     Screening colonoscopy Screening for colon cancer has been shown to reduce death from colon cancer by 25-30%. Screening recommended to start at 50 years and continuing until age 75 years.    Discuss   Screening for Lung Cancer     Low-dose CT scanning Screening can reduce mortality in persons aged 55-80 who have smoked at least 30 pack-years and who are either still smoking or have quit in the past 15 years.  No: is not indicated today.   Abdominal Aortic Aneurysm (AAA) screening    Ultrasound (US)   An aneurysm treated before rupture is very safe -a ruptured aneurysm can be fatal.  Screening  by US for AAA is limited to patients who meet one of the following criteria:    Men who are 65-75 years old and have smoked more than 100 cigarettes in their lifetime    Anyone with a family history of abdominal aortic aneurysm  No: is not indicated today.     MEDICARE WELLNESS EXAM PATIENT INSTRUCTIONS    Yearly exam:     See your health care provider every year in order to review changes in your health, review medicines that you take, and discuss preventive care needs such as immunizations and cancer screening.    Get a flu shot each year.     Advance Directive:    If you have not done so, you are encouraged to complete an advance directive. If you would like support with this, please contact the clinic  through the main clinic line. More information about advance directives can be found at:      [email or web address]    Nutrition:     Eat at least 5 servings of fruits and vegetables each day.     Eat whole-grain bread, whole-wheat pasta and brown rice instead of white grains and rice.     Talk to your doctor about Calcium and Vitamin D.     Lifestyle:    Exercise for at least 150 minutes a week (30 minutes a day, 5 days a week). This will help you control your weight and prevent disease.     Limit alcohol to one drink per day.     If you smoke, try to quit - your doctor will be happy to help.     Wear sunscreen to prevent skin cancer.     See your dentist every six months for an exam and cleaning.     See your eye doctor every 1 to 2 years to screen for conditions such as glaucoma, macular degeneration and cataracts.

## 2020-11-09 NOTE — PROGRESS NOTES
>65 year old Wellness Visit ( Medicare etc)         HPI       This 70 year old male presents as an established patient  Matthias Sanchez who presents for a  Annual Wellness Exam.    Other issues patient wants to address today:    1. concerned about moles on his back   Bleed occasionally    2. Thad   Has forms for A1c, MACR    3. Cough  Sticking in throat, then gagging  2x/day    4. CPAP accessory equipment  Need to contact supplier for equipment  Filters  Masks  [employer]  [place]     5. CHF  Wt Readings from Last 5 Encounters:   11/09/20 (!) 180.1 kg (397 lb)   11/07/19 (!) 168.8 kg (372 lb 1.6 oz)   10/30/19 (!) 170.6 kg (376 lb 1.6 oz)   09/26/19 (!) 175.1 kg (386 lb)   09/18/19 (!) 176.9 kg (390 lb 1.6 oz)     6.  Neoprene + velcore    7.         Visual Acuity: :  Testing not done declined by patient has an eye appt December 1st per pt     Patient Active Problem List   Diagnosis     Atrial fibrillation (H)     Hypothyroidism     Basal cell carcinoma of skin of face     CTS (carpal tunnel syndrome)     Myopia     Plantar fasciitis     Presbyopia     Regular astigmatism     Neoplasm of uncertain behavior of skin     Venous stasis     Type 2 diabetes mellitus with hyperglycemia, without long-term current use of insulin (H)     Morbid obesity (H)     Depression with anxiety     Hyperlipidemia LDL goal <100     Fall     BiPAP (biphasic positive airway pressure) dependence     Lymphedema     Chronic systolic congestive heart failure (H)     Essential hypertension with goal blood pressure less than 140/90     Urinary retention     Constipation, unspecified constipation type     Cellulitis     Onychomycosis     (HFpEF) heart failure with preserved ejection fraction (H)     Chronic hypercapnic respiratory failure (H)     Hypoventilation associated with obesity syndrome (H)     Pre-syncope     Elevated serum creatinine     PAD (peripheral artery disease) (H)     THONG (obstructive sleep apnea)     Hypotension      Adequate nutrition     Osteomyelitis of right foot, unspecified type (H)     Advanced directives, counseling/discussion       Past Medical History:   Diagnosis Date     Atrial fibrillation (H)      Basal cell carcinoma      Chronic anticoagulation      Diastolic heart failure (H)      Dyslipidemia      Essential hypertension      Morbid obesity (H)      Sleep-disordered breathing      Type 2 diabetes mellitus (H)         Family History   Problem Relation Age of Onset     Depression Mother      Glaucoma Maternal Grandfather      Cancer No family hx of         No family history of skin cancer     Macular Degeneration No family hx of          Problem List, Family History and past Medical History reviewed and unchanged/updated.       Health risk Assessment/ Review of Systems:         Constitutional:   Fevers or night sweats?  NO      Eyes:   Vision problems?  NO            Hearing:  Do you feel you have hearing loss?  NO  Cardiovascular:  Chest pain, palpitations, or pain with walking?  Yes, calf pain usual per pt      Respiratory:   Breathing problems or cough?  NO    :   Difficulty controlling urination?  NO        Musculoskeletal:   Stiffness or sore joints?  Yes, hand soreness and shaking             Skin:   concerning lesions or moles?  Yes, moles            Nervous System:   loss of strength or sensation,  numbness or tingling,  tremor,  dizziness,  headache?  Yes, hand tremor behavior and numbness in hands for minutes at a time.    Mental Health:   depression or anxiety,  sleep problems?  NO   Cognition:  Memory problems?  NO       Weight Loss: Have you lost 10 or more pounds unintentionally in the previous year?  NO  Energy: How much of the time during the past 3 weeks did you feel tired?   (check one of the following):   ?  All of the time  ? Most of the time  X  Some of the time  ? A little of the time  ? None of the Time    PHQ-2 Score:   PHQ-2 ( 1999 Pfizer) 11/9/2020 9/3/2019   Q1: Little interest or  pleasure in doing things 3 3   Q2: Feeling down, depressed or hopeless 3 3   PHQ-2 Score 6 6       PHQ-9 Score:   [facility] Follow-up to PHQ 8/27/2019 9/3/2019 1/6/2020   PHQ-9 9. Suicide Ideation past 2 weeks More than half the days Nearly every day Several days   Thoughts of suicide or self harm in past 2 weeks No - No   PHQ-9 Safety concerns? No - No     Medical Care:     What other specialists or organizations are involved in your medical care?  none   Patient Care Team       Relationship Specialty Notifications Start End    Matthias Sanchez MD PCP - General Family Practice  8/30/13     Phone: [phone] Fax: [phone]         [address] [place] [address]    Jada Mckeon APRN CNP Nurse Practitioner Cardiology Admissions 1/16/18     [facility]    Phone: [phone] Pager: [phone] Fax: [phone]        [facility] [address]    Marta Heath RN Nurse Coordinator Cardiology Admissions 1/16/18     [facility]    Phone: [phone] Fax: [phone]        Sybil Norman APRN CNP Nurse Practitioner Cardiology Admissions 6/13/18     Phone: [phone] Fax: [phone]         [address] [place] [address]    Lety Buenrostro PsyD Psychologist PSYCHOLOGIST CLINICAL  8/23/18     Phone: [phone] Fax: [phone]         2020 [address] [place] [address]    Laura Phan RN Nurse Coordinator Cardiology Admissions 11/28/18     [facility]    Phone: [phone]         Manas Argueta RN Specialty Care Coordinator Cardiology  2/27/19     Phone: [phone] Pager: [phone]        Jennifer Lou PA-C Physician Assistant Physician Assistant - Medical  5/13/19     Phone: [phone] Fax: [phone]         [address] [place] [address]    Tj Palumbo, [facility] Pharmacist Pharmacist  9/20/19     Phone: [phone] Fax: [phone]         [address] [facility] [address]    Jesús Lagos DPM MD Podiatrist  Primary Podiatric Medicine  9/30/19     Phone: [phone] Fax: [phone]         [address] [place] [address]    Ida Reese RN Specialty Care Coordinator Cardiology Admissions 12/23/19     Marychuy Lopez PA-C Physician Assistant Cardiology  2/26/20     Phone: [phone] Fax: [phone]         [address] [place] [address]    Stevo Mathew OD MD Optometry  6/30/20     Phone: [phone] Fax: [phone]         [address] [place] [address]    Matthias Sanchez MD Assigned PCP   9/27/20     Phone: [phone] Fax: [phone]         [address] [place] [address]    Jeúss Lagos DPM Assigned Musculoskeletal Provider   10/23/20     Phone: [phone] Fax: [phone]         [address] [place] [address]    Pieter Wilkinson MD Assigned Surgical Provider   10/23/20     Phone: [phone] Fax: [phone]         [address] [place] [address]    Jennifer Lou, LARRY Assigned Pediatric Specialist Provider   10/23/20     Phone: [phone] Fax: [phone]         [address] [place] [address]    Christian Willoughby MD Assigned Heart and Vascular Provider   10/23/20     Phone: [phone] Fax: [phone]         [facility] [address] [facility] [address]    Stevo Mathew OD MD Optometry  11/9/20     Phone: [phone] Fax: [phone]         [address] [place] [address]          Do you have an advanced directive? Yes      Social History / Home Safety     Social History     Tobacco Use     Smoking status: Never Smoker     Smokeless tobacco: Never Used   Substance Use Topics     Alcohol use: No     Comment: Former alcohol abuse. Quit 70s then quit later in 80s-present     Marital Status:Single  Who lives in your household? Lives alone  Does your home have any of the following safety concerns? Loose rugs in the hallway, no grab bars in the bathroom, no  handrails on the stairs or have poorly lit areas?  No   Do you feel threatened or controlled by a partner, ex-partner or anyone in your life? {Yes No   Has anyone hurt you physically, for example by pushing, hitting, slapping or kicking you   or forcing you to have sex? No       Functional Status     Do you need help with dressing yourself, bathing, or walking? YES   Do you need help with the phone, transportation, shopping, preparing meals, housework, laundry, medications or managing money? YES   By yourself and not using aids, do you have any difficulty walking up 10 steps  without resting?  YES uses a walker and cane  By yourself and not using aids, do you have any difficulty walking several hundred yards?  YES              Risk Behaviors and Healthy Habits     History   Smoking Status     Never Smoker   Smokeless Tobacco     Never Used     How many servings of fruits and vegetables do you eat a day? 2 cups per day  Exercise:No exercise None  Do you frequently drive without a seatbelt? No   History   Smoking Status     Never Smoker   Smokeless Tobacco     Never Used     Do you use any other drugs? No       Do you use alcohol?Yes, 1-2 glasses of wine per week      Functional Ability   Was the patient's timed Up & Go test (Get up from chair walk, 10 feet turn, return to chair and sit down) unsteady or longer than 10 seconds?  YES     Fall risk:     1. Have you fallen two or more times in the past year? No   2. Have you fallen and had an injury in the past year?  No         Evaulation of Cognitive Function     Family member/caregiver input: Normal    1) Repeat 3 items (Leader, Season, Table)    2) Clock draw: NORMAL  3) 3 item recall: Recalls 2 objects   Results: NORMAL clock, 1-2 items recalled: COGNITIVE IMPAIRMENT LESS LIKELY    Mini-CogTM Copyright KELTON Harper. Licensed by the author for use in [facility]; reprinted with permission ([email or web address]@.[email or web address]). All rights reserved.            Other Assessments:      CV Risk based on Pooled Cohort Risk (consider assessing every 4-6 years; consider statin in patients with 10-yr risk > 7.5%):   The 10-year ASCVD risk score (Nora CAPUTO Jr., et al., 2013) is: 43.6%    Values used to calculate the score:      Age: 70 years      Sex: Male      Is Non- : No      Diabetic: Yes      Tobacco smoker: No      Systolic Blood Pressure: 149 mmHg      Is BP treated: Yes      HDL Cholesterol: 37.2 mg/dL      Total Cholesterol: 143.6 mg/dL    Advance Directives: Discussed with patient and family as appropriate.  Has patient completed advance directives? Yes, advance care planning is on file.  Reviewed - confirms DNR/DNI      Immunization History   Administered Date(s) Administered     Influenza (High Dose) 3 valent vaccine 10/30/2019     Influenza (IIV3) PF 12/15/2005, 03/08/2007, 01/25/2008, 10/01/2010, 12/02/2011, 09/06/2017     Mantoux Tuberculin Skin Test 10/09/2017, 03/23/2019     Pneumo Conj 13-V (2010&after) 11/27/2017     Pneumococcal 23 valent 03/24/2019     TDAP Vaccine (Boostrix) 11/27/2017     Tdap (Adacel,Boostrix) 03/08/2007     Reviewed Immunization Record Today    Physical Exam     Vitals: BP (!) 149/77   Pulse 98   Temp 98.8  F (37.1  C) (Oral)   Resp 18   Wt (!) 180.1 kg (397 lb)   SpO2 96%   BMI 53.84 kg/m    BMI= Body mass index is 53.84 kg/m .     Wt Readings from Last 5 Encounters:   11/09/20 (!) 180.1 kg (397 lb)   11/07/19 (!) 168.8 kg (372 lb 1.6 oz)   10/30/19 (!) 170.6 kg (376 lb 1.6 oz)   09/26/19 (!) 175.1 kg (386 lb)   09/18/19 (!) 176.9 kg (390 lb 1.6 oz)         GENERAL APPEARANCE: alert and no distress  HENT: ear canals patent and TM's normal; mouth without ulcers or lesions; and oral mucous membranes moist  Hearing loss screen:   Normal   DENTITION:  Good repair?  yes  RESP: lungs clear to auscultation - no rales, rhonchi or wheezes  CV: regular rates and rhythm, no murmur, click or rub, no peripheral edema and peripheral pulses  strong  SKIN: no suspicious lesions or rashes  NEURO: Normal strength and tone  MENTAL STATUS EXAM  Appearance: appropriate  Attitude: cooperative  Behavior: normal  Eye Contact: normal  Speech: normal  Orientation: oreinted to person , place, time and situation  Mood:  good  Affect: Mood Congruent  Thought Process: clear    Skin: (back) mulitple SK. Viewed with dermatoscope. No suspicious lesions today      LABS    Results for orders placed or performed in visit on 11/09/20   Comprehensive Metabolic Panel ([facility])     Status: Abnormal   Result Value Ref Range    Calcium 9.5 8.5 - 10.1 mg/dL    Chloride 99.7 98.0 - 110.0 mmol/L    Carbon Dioxide 24.5 20.0 - 32.0 mmol/L    Creatinine 1.1 0.7 - 1.3 mg/dL    Glucose 113.1 (H) 70.0 - 99.0 mg'dL    Potassium 4.0 3.3 - 4.5 mmol/L    Sodium 135.9 132.6 - 141.4 mmol/L    Protein Total 7.4 6.8 - 8.8 g/dL    GFR Estimate 67.6 >60.0 mL/min/1.7 m2    GFR Estimate If Black 81.8 >60.0 mL/min/1.7 m2    Albumin 4.2 3.5 - 4.7 mg/dL    Alkaline Phosphatase 86.4 31.7 - 110.7 U/L    ALT 12.9 0.0 - 45.0 U/L    AST 19.3 0.0 - 55.0 U/L    Bilirubin Total 0.8 0.2 - 1.3 mg/dL    Urea Nitrogen 19.6 7.0 - 21.0 mg/dL   Lipid Cascade ([facility])     Status: Abnormal   Result Value Ref Range    Cholesterol 143.6 0.0 - 200.0 mg/dL    Cholesterol/HDL Ratio 3.9 0.0 - 5.0    HDL Cholesterol 37.2 (L) >40.0 mg/dL    Triglycerides 108.2 0.0 - 150.0 mg/dL    VLDL Cholesterol 21.6 7.0 - 32.0 mg/dL    LDL Cholesterol Calculated 85 0 - 129 mg/dL   CBC with Plt ([facility])     Status: Abnormal   Result Value Ref Range    WBC 8.9 4.0 - 11.0 K/uL    RBC 5.09 4.40 - 5.90 M/uL    Hemoglobin 15.1 13.3 - 17.7 g/dL    Hematocrit 49.0 40.0 - 53.0 %    MCV 96.3 78.0 - 100.0 fL    MCH 29.7 26.5 - 35.0 pg    MCHC 30.8 (L) 32.0 - 36.0 g/dL    Platelets 221.0 150.0 - 450.0 K/uL   Erythrocyte Sedimentation Rate ([facility])     Status: None   Result Value Ref Range    Sed Rate 19 0 - 20 mm/hr   INR ([facility])     Status:  None   Result Value Ref Range    INR 1.9    Hemoglobin A1c ([facility])     Status: Abnormal   Result Value Ref Range    Hemoglobin A1C 5.9 (H) 4.1 - 5.7 %               Assessment and Plan       1. Medicare annual wellness visit, initial    Fall risk - no falls, but slow Get Up and Go  Cognitive Screen - good  Advanced Directives - confirms DNR/DNI  Vision - has eye exam Dec 2020  Hearing - good  Feet - osteo  Dental - good  Screening - reviewed    - Hepatitis C antibody  - Albumin Random Urine Quantitative with Creat Ratio  - Hemoglobin A1c ([facility])    2. Chronic heart failure with preserved ejection fraction (H)  Has appt with Dr Willoughby soon  Wt up but does not appear to be fluid wt    - Comprehensive Metabolic Panel ([facility])  - Lipid Cascade ([facility])  - TSH with free T4 reflex  - N terminal pro BNP outpatient    3. Chronic atrial fibrillation (H)  - INR ([facility])    4. Osteomyelitis of right foot, unspecified type (H)  Stable    - CBC with Plt ([facility])  - Erythrocyte Sedimentation Rate ([facility])    5. Type 2 diabetes mellitus with hyperglycemia, without long-term current use of insulin (H)  A1c=5.9    6. Severe major depression (H)  Video visits with Dr FRANCOIS regularly  Improved PHQ9  Q9=2. This is normal for him and no threats of self harm    7. Polyneuropathy associated with underlying disease (H)  8. PAD (peripheral artery disease) (H)  Monitoring feet    9. Chronic hypercapnic respiratory failure (H)  Stable. O2 sat =96    10. Morbid (severe) obesity due to excess calories (H)  Has gained 20# in last year    11. THONG (obstructive sleep apnea)  Loves the CPAP. Better breathing than when he was young  Needs new supplies    - CPAP Order for DME - ONLY FOR DME    12. Seborrheic Keratoses  On back - multiple  Monitor yearly    13.  Need for prophylactic vaccination and inoculation against influenza  - FLUZONE HIGH DOSE 65+  [53811]          Options for treatment and follow-up care were reviewed with the  Clarke Moreira and/or guardian engaged in the decision making process and verbalized understanding of the options discussed and agreed with the final plan.    Matthias Sanchez MD

## 2020-11-09 NOTE — PATIENT INSTRUCTIONS
fScheduling:  If you had an XRay/CT/Ultrasound/MRI ordered the number is [phone] to schedule or change your radiology appointment.   [facility] [employer] PERSONAL PREVENTIVE SERVICES PLAN - SERVICES  For Men          Review these tests with your doctor then decide which ones you want and take this page home for your reference          Immunization History   Administered Date(s) Administered     Influenza (High Dose) 3 valent vaccine 10/30/2019     Influenza (IIV3) PF 12/15/2005, 03/08/2007, 01/25/2008, 10/01/2010, 12/02/2011, 09/06/2017     Mantoux Tuberculin Skin Test 10/09/2017, 03/23/2019     Pneumo Conj 13-V (2010&after) 11/27/2017     Pneumococcal 23 valent 03/24/2019     TDAP Vaccine (Boostrix) 11/27/2017     Tdap (Adacel,Boostrix) 03/08/2007                                                                                             IMMUNIZATIONS Description Recommend today?      Influenza (flu shot) Helps to prevent flu; you should get it every year offer   PCV 13 Prevents pneumonia; given once No; is up to date.   PPSV 23 Prevents pneumonia; given once No; is up to date.   Tetanus Prevents tetanus; need every 10 years No; is up to date.   Herpes Zoster (shingles) Prevents or lessens symptoms from shingles; given once.  Offer  If you want this vaccine please go to a pharmacy to receive Shinrix   Hepatitis B  If you have any of the following risk factors you should be immunized for hepatitis B: severe kidney disease, people who live in the same house as a carrier of Hepatitis B virus, people who live in  institutions (e.g. nursing homes or group homes), homosexual men, patients with hemophilia who received Factor VIII or IX concentrates, abusers of illicit injectable drugs    9/2019  HBsAg - neg  HBsAb - nonreactive     Candidate for  vaccine           Health Maintenance   Topic Date Due     HF ACTION PLAN  1950     DEPRESSION ACTION PLAN  1950     HEPATITIS B IMMUNIZATION (1 of 3 - Risk 3-dose  series) 05/24/1969     ZOSTER IMMUNIZATION (1 of 2) 05/24/2000     MICROALBUMIN  11/10/2018     EYE EXAM  06/12/2019     LIPID  08/28/2019     COLORECTAL CANCER SCREENING  09/09/2019     PHQ-9  04/06/2020     BMP  05/05/2020     DIABETIC FOOT EXAM  05/16/2020     INFLUENZA VACCINE (1) 09/01/2020     MEDICARE ANNUAL WELLNESS VISIT  09/03/2020     FALL RISK ASSESSMENT  09/03/2020     A1C  10/09/2020     ALT  11/05/2020     CBC  11/05/2020     ADVANCE CARE PLANNING  09/03/2024     DTAP/TDAP/TD IMMUNIZATION (3 - Td) 11/27/2027     TSH W/FREE T4 REFLEX  Completed     HEPATITIS C SCREENING  Completed     Pneumococcal Vaccine: 65+ Years  Completed     AORTIC ANEURYSM SCREENING (SYSTEM ASSIGNED)  Completed     Pneumococcal Vaccine: Pediatrics (0 to 5 Years) and At-Risk Patients (6 to 64 Years)  Aged Out     IPV IMMUNIZATION  Aged Out     MENINGITIS IMMUNIZATION  Aged Out            SCREENING TESTS       Description    Year of Last Screening    Recommended Today?   Heart disease screening blood tests    Cholesterol level Reducing cholesterol can reduce your risk of heart attacks by 25%.  Screening is recommended yearly if you are at risk of heart disease otherwise every 4-5 years   ordered      Diabetes screening tests    Hemoglobin A1c blood test    Finding and treating diabetes early can reduce complications.  Screening recommended/covered yearly if you have high blood pressure, high cholesterol, obesity (BMI >30), or a history of high blood glucose tests; or 2 of the following: family history of diabetes, overweight (BMI >25 but <30), age 65 years or older, and a history of diabetes of pregnancy or gave birth to baby weighing more than 9 lbs.      Done July 2020  A1c=5.9   Hepatitis B screening Finding hepatitis B early can reduce complications.  Screening is recommended for persons with selected risk factors.   Normal 9/2019   Hepatitis C screening Finding hepatitis C early can reduce complications.  Screening is  recommended for all persons born from 1945 through 1965 and for those with selected other risk factors.    ordered   HIV screening Finding HIV early can reduce complications.  Screening is recommended for persons with risk factors for HIV infection.   discuss   Glaucoma screening Early detection of glaucoma can prevent blindness.   Please talk to your eye doctor about this.         SCREENING TESTS       Description    Year of Last Screening    Recommended Today?   Colorectal cancer screening    Fecal occult blood test     Screening colonoscopy Screening for colon cancer has been shown to reduce death from colon cancer by 25-30%. Screening recommended to start at 50 years and continuing until age 75 years.     Discuss   Screening for Lung Cancer     Low-dose CT scanning Screening can reduce mortality in persons aged 55-80 who have smoked at least 30 pack-years and who are either still smoking or have quit in the past 15 years.   No: is not indicated today.   Abdominal Aortic Aneurysm (AAA) screening    Ultrasound (US)    An aneurysm treated before rupture is very safe -a ruptured aneurysm can be fatal.  Screening  by US for AAA is limited to patients who meet one of the following criteria:    Men who are 65-75 years old and have smoked more than 100 cigarettes in their lifetime    Anyone with a family history of abdominal aortic aneurysm   No: is not indicated today.      MEDICARE WELLNESS EXAM PATIENT INSTRUCTIONS     Yearly exam:     See your health care provider every year in order to review changes in your health, review medicines that you take, and discuss preventive care needs such as immunizations and cancer screening.    Get a flu shot each year.      Advance Directive:    If you have not done so, you are encouraged to complete an advance directive. If you would like support with this, please contact the clinic  through the main clinic line. More information about advance directives can be found  at:     [email or web address]     Nutrition:     Eat at least 5 servings of fruits and vegetables each day.     Eat whole-grain bread, whole-wheat pasta and brown rice instead of white grains and rice.     Talk to your doctor about Calcium and Vitamin D.      Lifestyle:    Exercise for at least 150 minutes a week (30 minutes a day, 5 days a week). This will help you control your weight and prevent disease.     Limit alcohol to one drink per day.     If you smoke, try to quit - your doctor will be happy to help.     Wear sunscreen to prevent skin cancer.     See your dentist every six months for an exam and cleaning.     See your eye doctor every 1 to 2 years to screen for conditions such as glaucoma, macular degeneration and cataracts.

## 2020-11-10 LAB — HCV AB SERPL QL IA: NONREACTIVE

## 2020-11-12 ENCOUNTER — CARE COORDINATION (OUTPATIENT)
Dept: CARDIOLOGY | Facility: CLINIC | Age: 70
End: 2020-11-12

## 2020-11-12 ENCOUNTER — VIRTUAL VISIT (OUTPATIENT)
Dept: CARDIOLOGY | Facility: CLINIC | Age: 70
End: 2020-11-12
Attending: INTERNAL MEDICINE
Payer: COMMERCIAL

## 2020-11-12 DIAGNOSIS — I50.32 CHRONIC HEART FAILURE WITH PRESERVED EJECTION FRACTION (H): ICD-10-CM

## 2020-11-12 DIAGNOSIS — I50.32 CHRONIC HEART FAILURE WITH PRESERVED EJECTION FRACTION (H): Primary | ICD-10-CM

## 2020-11-12 PROCEDURE — 99214 OFFICE O/P EST MOD 30 MIN: CPT | Mod: 95 | Performed by: INTERNAL MEDICINE

## 2020-11-12 ASSESSMENT — PAIN SCALES - GENERAL: PAINLEVEL: NO PAIN (0)

## 2020-11-12 NOTE — PROGRESS NOTES
Cardiovascular Medicine    Patient gave consent for visit      Problem List  1, Chronic systolic heart failure  2. Morbid obesity  3. Sleep disordered breathing  4. Atrial fibrillation  5. Hyperlipidemia  6. Multi-faceted shortness of breath  7. Anxiety/depression  8. Type II DM  9. Warfarin anticoagulation  10. Allergies: none  11. Lymphedema  12. Hypertension  13. Sepsis  14. Diabetic neuropathy  15. Osteomyelitis of the right great toe, on treatment    Referring:     Matthias Sanchez M.D.  [facility] - [facility]    Plan:  1. Continue warfarin as persistent atrial fibrillation  2. Weight gain appears to be caloric in origin but no PND, orthopnea, palpitation, pre-syncope or syncope. Patient has had pre-syncope  3. Heart rate is adequately controlled at rest and no symptoms or findings to suggest decompensated CHF  4. He has a clear understanding of the weight issue and non-sustainability of this.    5. Social work referral for help with shopping and living alone may be done through [facility]   6. Follow-up visit 8 weeks    History    70 year old male seen for follow-up evaluation and treatment regarding congestive heart failure in the context of morbid obesity. Last time he was in clinic, he had c/o pre-syncope, sometimes positional, sometimes postprandial. His aldactone had been reduced, but now back to 25 mg BID. His lisinopril was stopped and he was not in favor of adding this back at his [facility] appointment.     At this appointment, he is feeling overall better. Still some positional dizziness, but improved. He is markedly increase in weight. He is not having any changes to his breathing. No exertional chest pain.  does not have an ICD.  He has poor baseline exercise tolerance, walks with a walker and has some home physical therapy.    He feels like his depression is better controlled at this time. He is not eating uncontrollably at this time. He is still having some disassociative symptoms,  which are longstanding for him. He is seeing a psychotherapist.       Wt Readings from Last 24 Encounters:   11/09/20 (!) 180.1 kg (397 lb)   11/07/19 (!) 168.8 kg (372 lb 1.6 oz)   10/30/19 (!) 170.6 kg (376 lb 1.6 oz)   09/26/19 (!) 175.1 kg (386 lb)   09/18/19 (!) 176.9 kg (390 lb 1.6 oz)   09/03/19 (!) 179.2 kg (395 lb)   08/27/19 (!) 178.8 kg (394 lb 3.2 oz)   06/13/19 (!) 173.9 kg (383 lb 6.4 oz)   05/20/19 (!) 173.9 kg (383 lb 6.4 oz)   05/16/19 (!) 174.5 kg (384 lb 12.8 oz)   05/10/19 (!) 174.5 kg (384 lb 12.8 oz)   04/17/19 (!) 172.7 kg (380 lb 12.8 oz)   04/02/19 (!) 168.9 kg (372 lb 6.4 oz)   04/02/19 (!) 170 kg (374 lb 12.8 oz)   03/20/19 (!) 161.8 kg (356 lb 11.3 oz)   02/19/19 (!) 175.4 kg (386 lb 9.6 oz)   12/12/18 (!) 175.1 kg (386 lb)   12/11/18 (!) 176.3 kg (388 lb 9.6 oz)   10/30/18 (!) 175.7 kg (387 lb 6.4 oz)   10/22/18 (!) 175.5 kg (387 lb)   10/10/18 (!) 173.3 kg (382 lb)   09/07/18 (!) 167.8 kg (369 lb 14.4 oz)   08/28/18 (!) 167.8 kg (370 lb)   07/18/18 (!) 170.1 kg (375 lb)       Occupational: middle management    Longstanding depression    Family: orphan    Allergy: none    Surgery: broken bones    Has bariatric bed and chair     REVIEW of SYMPTOMS    Constitutional: without fever, chills, night sweats.  Weight is down 5 lbs in the last week  HEENT: without dry eyes, dry mouth  Endocrine: without polyuria, polydypsia, polyphagia, heat or cold intolerance, changing mental status. Hemoglobin A1C modestly elevated  Cardiology: without chest pain, tightness, heaviness, pressure, paroxysmal dyspnea, orthopnea, palpitation, pre-syncope or syncope   Pulmonary: without asthma, wheezing, cough, hemoptysis  GI: without nausea, emesis, jaundice, pain, hematemesis, melena  : without frequency, urgency, hematuria, stones, pain, abnormal bleeding, frequency, urgency but history of urinary retention requiring catherization   Neurologic: without TIA, CVA, trauma, seizure  Dermatologic: without lesions,  abrasion rash,   Orthopedic/Rheum: without significant joint pain, impairment, limb, polyserositis, ulceration, Raynauds  Heme: without mass, bruising, frequent infection, anemia  Psychiatric: without substance abuse, hallucination, medication, depression      Objective  Constitutional: alert, oriented, normal abnormal  Abnormal gait. and station, normal mentation.  Oral: moist mucous membranes  Abdomen: withoutascites  Extremities: Chronic lymphedema  Neuro: no focal defects, normal mentation  Skin: without open lesions save for toe  Psych: oriented, verbal, mental status in tact      Meds    Current Outpatient Medications   Medication     ammonium lactate (LAC-HYDRIN) 12 % external cream     Ascorbic Acid (VITAMIN C) POWD     aspirin (ASA) 81 MG tablet     atorvastatin (LIPITOR) 40 MG tablet     bacitracin 500 UNIT/GM external ointment     blood glucose (ONETOUCH ULTRA) test strip     blood glucose monitoring (ONE TOUCH DELICA) lancets     blood glucose monitoring (ONE TOUCH ULTRA MINI) meter device kit     levothyroxine (SYNTHROID/LEVOTHROID) 175 MCG tablet     metFORMIN (GLUCOPHAGE) 500 MG tablet     metoprolol succinate ER (TOPROL-XL) 100 MG 24 hr tablet     multivitamin w/minerals (MULTI-VITAMIN) tablet     nystatin (MYCOSTATIN) 322767 UNIT/GM external cream     omega 3 1000 MG CAPS     order for DME     order for DME     order for DME     order for DME     potassium chloride ER (KLOR-CON M) 20 MEQ CR tablet     senna-docusate (SENOKOT-S/PERICOLACE) 8.6-50 MG tablet     spironolactone (ALDACTONE) 25 MG tablet     tamsulosin (FLOMAX) 0.4 MG capsule     torsemide (DEMADEX) 100 MG tablet     vitamin B complex with vitamin C (VITAMIN  B COMPLEX) tablet     vitamin C (ASCORBIC ACID) 1000 MG TABS     vitamin E (VITAMIN E COMPLEX) 1000 units (450 mg) capsule     warfarin ANTICOAGULANT (COUMADIN) 5 MG tablet     No current facility-administered medications for this visit.      Labs    Results for AJIT CALLAHAN  (MRN [id number]) as of 11/11/2020 21:33   Ref. Range 11/9/2020 10:23 11/9/2020 10:39   Sodium Latest Ref Range: 132.6 - 141.4 mmol/L 135.9    Potassium Latest Ref Range: 3.3 - 4.5 mmol/L 4.0    Chloride Latest Ref Range: 98.0 - 110.0 mmol/L 99.7    Carbon Dioxide Latest Ref Range: 20.0 - 32.0 mmol/L 24.5    Urea Nitrogen Latest Ref Range: 7.0 - 21.0 mg/dL 19.6    Creatinine Latest Ref Range: 0.7 - 1.3 mg/dL 1.1    GFR Estimate Latest Ref Range: >60.0 mL/min/1.7 m2 67.6    GFR Estimate If Black Latest Ref Range: >60.0 mL/min/1.7 m2 81.8    Calcium Latest Ref Range: 8.5 - 10.1 mg/dL 9.5    Albumin Latest Ref Range: 3.5 - 4.7 mg/dL 4.2    Protein Total Latest Ref Range: 6.8 - 8.8 g/dL 7.4    Bilirubin Total Latest Ref Range: 0.2 - 1.3 mg/dL 0.8    Alkaline Phosphatase Latest Ref Range: 31.7 - 110.7 U/L 86.4    ALT Latest Ref Range: 0.0 - 45.0 U/L 12.9    AST Latest Ref Range: 0.0 - 55.0 U/L 19.3    Hemoglobin A1C Latest Ref Range: 4.1 - 5.7 %  5.9 (H)   Cholesterol Latest Ref Range: 0.0 - 200.0 mg/dL 143.6    Cholesterol/HDL Ratio Latest Ref Range: 0.0 - 5.0  3.9    HDL Cholesterol Latest Ref Range: >40.0 mg/dL 37.2 (L)    LDL Cholesterol Calculated Latest Ref Range: 0 - 129 mg/dL 85    VLDL Cholesterol Latest Ref Range: 7.0 - 32.0 mg/dL 21.6    Triglycerides Latest Ref Range: 0.0 - 150.0 mg/dL 108.2    Glucose Latest Ref Range: 70.0 - 99.0 mg'dL 113.1 (H)    WBC Latest Ref Range: 4.0 - 11.0 K/uL 8.9    Hemoglobin Latest Ref Range: 13.3 - 17.7 g/dL 15.1    Hematocrit Latest Ref Range: 40.0 - 53.0 % 49.0    Platelets Latest Ref Range: 150.0 - 450.0 K/uL 221.0    RBC Latest Ref Range: 4.40 - 5.90 M/uL 5.09    MCV Latest Ref Range: 78.0 - 100.0 fL 96.3    MCH Latest Ref Range: 26.5 - 35.0 pg 29.7    MCHC Latest Ref Range: 32.0 - 36.0 g/dL 30.8 (L)    Sed Rate Latest Ref Range: 0 - 20 mm/hr 19    INR Unknown 1.9        Imaging     3/2019 ECHO  Interpretation Summary  Borderline (EF 50-55%) reduced left ventricular function  is present.  Right ventricular systolic dysfunction appears mild.  No significant valve dysfunction.  No vegetation detected.    2018 ECHO  Interpretation Summary  Technically difficult study. The patient's rhythm is atrial fibrillation.  Global and regional left ventricular function is normal with an EF of 55-60%.  Global right ventricular function is mildly reduced.  No significant valvular abnormalities were noted.  Dilation of the inferior vena cava is present with normal respiratory  variation in diameter.Estimated mean right atrial pressure is 8 mmHg.  No pericardial effusion is present.    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 MRA for further evaluation.      Comparison MRA: None.      1. The aortic root is mildly dilated, measuring 3.9 cm at the level of sinus of Valsalva.     2. The ascending aorta measures 3.9 cm. The transverse arch and descending thoracic aorta are normal in  size without an aneurysm or dissection.     2. The aortic arch is left sided. The brachiocaphalic and left common carotid arteries both have common  origin. There is no coarctation.     3. The main and proximal branch pulmonary arteries are normal in size.      4. The systemic venous connections are normal.      2018 RHC and Coronary Angiogram        Assessment/Plan   He is doing better at this visit than in the past. He appears euvolemic. His depression is under better control. We will not make any medication changes today. His lisinopril was discontinued due to pre-syncope at prior visits, we could consider adding back cautiously in the future, but we held off for today, especially as is EF is now normal.     # Chronic borderline diastolic heart failure/HFpEF with improved EF  Stage C. NYHA Class III- although confounded by body habitus and comorbidities   Fluid  status: Euvolemic today, continue current torsemide  ACEi/ARB/ARNI: Will need to reassess at visit in 3 months (see above)  BB: Toprol 100 mg daily  Aldosterone antagonist: spironolactone 50 mg daily (despite normal EF now, some benefit shown in TOPCAT trial)  SCD prophylaxis: n/a EF>40%  Ischemic evaluation: negative coronary angiogram 6/2018  Remote monitoring: uses [employer]      # Afib  -continue the Toprol 100 mg daily     # HTN  -controlled on HF therapies     # Atrial fibrillation ?permanant, hx of ablation per patient   [id number]   -he is on warfarin and Toprol     # THONG/alveolar hypoventilation  -encouraged consistent CPAP use       Follow-up in January with Dr. Willoughby or [facility]      I saw patient in shared visit format and confirmed history and physical and jointly formulated plan to patient.        CC  Matthias Sanchez  [facility]  Dilia Greene, Ph.D. [employer]

## 2020-11-12 NOTE — PROGRESS NOTES
"Clarke Moreira is a 70 year old male who is being evaluated via a billable telephone visit.      The patient has been notified of following:     \"This telephone visit will be conducted via a call between you and your physician/provider. We have found that certain health care needs can be provided without the need for a physical exam.  This service lets us provide the care you need with a short phone conversation.  If a prescription is necessary we can send it directly to your pharmacy.  If lab work is needed we can place an order for that and you can then stop by our lab to have the test done at a later time.    If during the course of the call the physician/provider feels a telephone visit is not appropriate, you will not be charged for this service.\"                "

## 2020-11-12 NOTE — LETTER
11/12/2020      RE: Clarke Moreira  [address]  [address]  [facility] [address]       Dear Colleague,    Thank you for the opportunity to participate in the care of your patient, Clarke Moreira, at the [facility]. Please see a copy of my visit note below.      Cardiovascular Medicine      Problem List  1, Chronic systolic heart failure  2. Morbid obesity  3. Sleep disordered breathing  4. Atrial fibrillation  5. Hyperlipidemia  6. Multi-faceted shortness of breath  7. Anxiety/depression  8. Type II DM  9. Warfarin anticoagulation  10. Allergies: none  11. Lymphedema  12. Hypertension  13. Sepsis  14. Diabetic neuropathy  15. Osteomyelitis of the right great toe, on treatment    Referring:     Matthias Sanchez M.D.  [facility]bekah Reddy - [facility]    Plan:  1. Continue warfarin as persistent atrial fibrillation  2. Weight gain appears to be caloric in origin but no PND, orthopnea, palpitation, pre-syncope or syncope. Patient has had pre-syncope  3. Heart rate is adequately controlled at rest and no symptoms or findings to suggest decompensated CHF  4. He has a clear understanding of the weight issue and non-sustainability of this.    5. Social work referral for help with shopping and living alone may be done through [facility]   6. Follow-up visit 8 weeks    History    70 year old male seen for follow-up evaluation and treatment regarding congestive heart failure in the context of morbid obesity. Last time he was in clinic, he had c/o pre-syncope, sometimes positional, sometimes postprandial. His aldactone had been reduced, but now back to 25 mg BID. His lisinopril was stopped and he was not in favor of adding this back at his [facility] appointment.     At this appointment, he is feeling overall better. Still some positional dizziness, but improved. He is markedly increase in weight. He is not having any changes to his  breathing. No exertional chest pain.  does not have an ICD.  He has poor baseline exercise tolerance, walks with a walker and has some home physical therapy.    He feels like his depression is better controlled at this time. He is not eating uncontrollably at this time. He is still having some disassociative symptoms, which are longstanding for him. He is seeing a psychotherapist.       Wt Readings from Last 24 Encounters:   11/09/20 (!) 180.1 kg (397 lb)   11/07/19 (!) 168.8 kg (372 lb 1.6 oz)   10/30/19 (!) 170.6 kg (376 lb 1.6 oz)   09/26/19 (!) 175.1 kg (386 lb)   09/18/19 (!) 176.9 kg (390 lb 1.6 oz)   09/03/19 (!) 179.2 kg (395 lb)   08/27/19 (!) 178.8 kg (394 lb 3.2 oz)   06/13/19 (!) 173.9 kg (383 lb 6.4 oz)   05/20/19 (!) 173.9 kg (383 lb 6.4 oz)   05/16/19 (!) 174.5 kg (384 lb 12.8 oz)   05/10/19 (!) 174.5 kg (384 lb 12.8 oz)   04/17/19 (!) 172.7 kg (380 lb 12.8 oz)   04/02/19 (!) 168.9 kg (372 lb 6.4 oz)   04/02/19 (!) 170 kg (374 lb 12.8 oz)   03/20/19 (!) 161.8 kg (356 lb 11.3 oz)   02/19/19 (!) 175.4 kg (386 lb 9.6 oz)   12/12/18 (!) 175.1 kg (386 lb)   12/11/18 (!) 176.3 kg (388 lb 9.6 oz)   10/30/18 (!) 175.7 kg (387 lb 6.4 oz)   10/22/18 (!) 175.5 kg (387 lb)   10/10/18 (!) 173.3 kg (382 lb)   09/07/18 (!) 167.8 kg (369 lb 14.4 oz)   08/28/18 (!) 167.8 kg (370 lb)   07/18/18 (!) 170.1 kg (375 lb)       Occupational: middle management    Longstanding depression    Family: orphan    Allergy: none    Surgery: broken bones    Has bariatric bed and chair     REVIEW of SYMPTOMS    Constitutional: without fever, chills, night sweats.  Weight is down 5 lbs in the last week  HEENT: without dry eyes, dry mouth  Endocrine: without polyuria, polydypsia, polyphagia, heat or cold intolerance, changing mental status. Hemoglobin A1C modestly elevated  Cardiology: without chest pain, tightness, heaviness, pressure, paroxysmal dyspnea, orthopnea, palpitation, pre-syncope or syncope   Pulmonary: without asthma,  wheezing, cough, hemoptysis  GI: without nausea, emesis, jaundice, pain, hematemesis, melena  : without frequency, urgency, hematuria, stones, pain, abnormal bleeding, frequency, urgency but history of urinary retention requiring catherization   Neurologic: without TIA, CVA, trauma, seizure  Dermatologic: without lesions, abrasion rash,   Orthopedic/Rheum: without significant joint pain, impairment, limb, polyserositis, ulceration, Raynauds  Heme: without mass, bruising, frequent infection, anemia  Psychiatric: without substance abuse, hallucination, medication, depression      Objective  Constitutional: alert, oriented, normal abnormal  Abnormal gait. and station, normal mentation.  Oral: moist mucous membranes  Abdomen: withoutascites  Extremities: Chronic lymphedema  Neuro: no focal defects, normal mentation  Skin: without open lesions save for toe  Psych: oriented, verbal, mental status in tact      Meds    Current Outpatient Medications   Medication     ammonium lactate (LAC-HYDRIN) 12 % external cream     Ascorbic Acid (VITAMIN C) POWD     aspirin (ASA) 81 MG tablet     atorvastatin (LIPITOR) 40 MG tablet     bacitracin 500 UNIT/GM external ointment     blood glucose (ONETOUCH ULTRA) test strip     blood glucose monitoring (ONE TOUCH DELICA) lancets     blood glucose monitoring (ONE TOUCH ULTRA MINI) meter device kit     levothyroxine (SYNTHROID/LEVOTHROID) 175 MCG tablet     metFORMIN (GLUCOPHAGE) 500 MG tablet     metoprolol succinate ER (TOPROL-XL) 100 MG 24 hr tablet     multivitamin w/minerals (MULTI-VITAMIN) tablet     nystatin (MYCOSTATIN) 592245 UNIT/GM external cream     omega 3 1000 MG CAPS     order for DME     order for DME     order for DME     order for DME     potassium chloride ER (KLOR-CON M) 20 MEQ CR tablet     senna-docusate (SENOKOT-S/PERICOLACE) 8.6-50 MG tablet     spironolactone (ALDACTONE) 25 MG tablet     tamsulosin (FLOMAX) 0.4 MG capsule     torsemide (DEMADEX) 100 MG tablet      vitamin B complex with vitamin C (VITAMIN  B COMPLEX) tablet     vitamin C (ASCORBIC ACID) 1000 MG TABS     vitamin E (VITAMIN E COMPLEX) 1000 units (450 mg) capsule     warfarin ANTICOAGULANT (COUMADIN) 5 MG tablet     No current facility-administered medications for this visit.      Labs    Results for AJIT CALLAHAN (MRN [id number]) as of 11/11/2020 21:33   Ref. Range 11/9/2020 10:23 11/9/2020 10:39   Sodium Latest Ref Range: 132.6 - 141.4 mmol/L 135.9    Potassium Latest Ref Range: 3.3 - 4.5 mmol/L 4.0    Chloride Latest Ref Range: 98.0 - 110.0 mmol/L 99.7    Carbon Dioxide Latest Ref Range: 20.0 - 32.0 mmol/L 24.5    Urea Nitrogen Latest Ref Range: 7.0 - 21.0 mg/dL 19.6    Creatinine Latest Ref Range: 0.7 - 1.3 mg/dL 1.1    GFR Estimate Latest Ref Range: >60.0 mL/min/1.7 m2 67.6    GFR Estimate If Black Latest Ref Range: >60.0 mL/min/1.7 m2 81.8    Calcium Latest Ref Range: 8.5 - 10.1 mg/dL 9.5    Albumin Latest Ref Range: 3.5 - 4.7 mg/dL 4.2    Protein Total Latest Ref Range: 6.8 - 8.8 g/dL 7.4    Bilirubin Total Latest Ref Range: 0.2 - 1.3 mg/dL 0.8    Alkaline Phosphatase Latest Ref Range: 31.7 - 110.7 U/L 86.4    ALT Latest Ref Range: 0.0 - 45.0 U/L 12.9    AST Latest Ref Range: 0.0 - 55.0 U/L 19.3    Hemoglobin A1C Latest Ref Range: 4.1 - 5.7 %  5.9 (H)   Cholesterol Latest Ref Range: 0.0 - 200.0 mg/dL 143.6    Cholesterol/HDL Ratio Latest Ref Range: 0.0 - 5.0  3.9    HDL Cholesterol Latest Ref Range: >40.0 mg/dL 37.2 (L)    LDL Cholesterol Calculated Latest Ref Range: 0 - 129 mg/dL 85    VLDL Cholesterol Latest Ref Range: 7.0 - 32.0 mg/dL 21.6    Triglycerides Latest Ref Range: 0.0 - 150.0 mg/dL 108.2    Glucose Latest Ref Range: 70.0 - 99.0 mg'dL 113.1 (H)    WBC Latest Ref Range: 4.0 - 11.0 K/uL 8.9    Hemoglobin Latest Ref Range: 13.3 - 17.7 g/dL 15.1    Hematocrit Latest Ref Range: 40.0 - 53.0 % 49.0    Platelets Latest Ref Range: 150.0 - 450.0 K/uL 221.0    RBC Latest Ref Range: 4.40 - 5.90 M/uL  5.09    MCV Latest Ref Range: 78.0 - 100.0 fL 96.3    MCH Latest Ref Range: 26.5 - 35.0 pg 29.7    MCHC Latest Ref Range: 32.0 - 36.0 g/dL 30.8 (L)    Sed Rate Latest Ref Range: 0 - 20 mm/hr 19    INR Unknown 1.9        Imaging     3/2019 ECHO  Interpretation Summary  Borderline (EF 50-55%) reduced left ventricular function is present.  Right ventricular systolic dysfunction appears mild.  No significant valve dysfunction.  No vegetation detected.    2018 ECHO  Interpretation Summary  Technically difficult study. The patient's rhythm is atrial fibrillation.  Global and regional left ventricular function is normal with an EF of 55-60%.  Global right ventricular function is mildly reduced.  No significant valvular abnormalities were noted.  Dilation of the inferior vena cava is present with normal respiratory  variation in diameter.Estimated mean right atrial pressure is 8 mmHg.  No pericardial effusion is present.    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 MRA for further evaluation.      Comparison MRA: None.      1. The aortic root is mildly dilated, measuring 3.9 cm at the level of sinus of Valsalva.     2. The ascending aorta measures 3.9 cm. The transverse arch and descending thoracic aorta are normal in  size without an aneurysm or dissection.     2. The aortic arch is left sided. The brachiocaphalic and left common carotid arteries both have common  origin. There is no coarctation.     3. The main and proximal branch pulmonary arteries are normal in size.      4. The systemic venous connections are normal.      2018 RHC and Coronary Angiogram        Assessment/Plan   He is doing better at this visit than in the past. He appears euvolemic. His depression is under better control. We will not make any medication changes today. His lisinopril was  "discontinued due to pre-syncope at prior visits, we could consider adding back cautiously in the future, but we held off for today, especially as is EF is now normal.     # Chronic borderline diastolic heart failure/HFpEF with improved EF  Stage C. NYHA Class III- although confounded by body habitus and comorbidities   Fluid status: Euvolemic today, continue current torsemide  ACEi/ARB/ARNI: Will need to reassess at visit in 3 months (see above)  BB: Toprol 100 mg daily  Aldosterone antagonist: spironolactone 50 mg daily (despite normal EF now, some benefit shown in TOPCAT trial)  SCD prophylaxis: n/a EF>40%  Ischemic evaluation: negative coronary angiogram 6/2018  Remote monitoring: uses [employer]      # Afib  -continue the Toprol 100 mg daily     # HTN  -controlled on HF therapies     # Atrial fibrillation ?permanant, hx of ablation per patient   [id number]   -he is on warfarin and Toprol     # THONG/alveolar hypoventilation  -encouraged consistent CPAP use       Follow-up in January with Dr. Willoughby or [facility]      I saw patient in shared visit format and confirmed history and physical and jointly formulated plan to patient.        CC  Matthias Sanchez  [facility]  Dilia Greene, Ph.D. [employer]      Clarke Moreira is a 70 year old male who is being evaluated via a billable telephone visit.      The patient has been notified of following:     \"This telephone visit will be conducted via a call between you and your physician/provider. We have found that certain health care needs can be provided without the need for a physical exam.  This service lets us provide the care you need with a short phone conversation.  If a prescription is necessary we can send it directly to your pharmacy.  If lab work is needed we can place an order for that and you can then stop by our lab to have the test done at a later time.    If during the course of the call the physician/provider feels a telephone visit " "is not appropriate, you will not be charged for this service.\"      Please do not hesitate to contact me if you have any questions/concerns.     Sincerely,     Christian Willoughby MD    "

## 2020-11-17 ENCOUNTER — TELEPHONE (OUTPATIENT)
Dept: PHARMACY | Facility: CLINIC | Age: 70
End: 2020-11-17

## 2020-11-17 DIAGNOSIS — I48.19 PERSISTENT ATRIAL FIBRILLATION (H): ICD-10-CM

## 2020-11-17 RX ORDER — WARFARIN SODIUM 5 MG/1
TABLET ORAL
Qty: 60 TABLET | Refills: 3 | Status: SHIPPED | OUTPATIENT
Start: 2020-11-17 | End: 2021-04-27

## 2020-11-17 NOTE — TELEPHONE ENCOUNTER
"    ANTICOAGULATION   Indication for therapy: Atrial Fibrillation 427.31    INR goal: 2.0 - 3.0      Taking Warfarin as prescribed?: Yes    Since his last visit:    Complications: None    Diet/Med changes: None      Taking any other anticoagulation medications? : No    Started or stopped any other medications in last 3 weeks: No    Current warfarin dose:   Patient's Dose prior to today's visit: 60mg per week.    Current Warfarin dose:    Warfarin dose:    Sunday Dose: 10 mg    Monday Dose: 10 mg    Tuesday Dose: 5 mg    Wednesday Dose: 10 mg    Thursday Dose: 5 mg    Friday Dose: 10 mg    Saturday Dose: 10 mg       INR today in the home:       Lab Results   Component Value Date    INR 1.9 11/09/2020    INR 1.4 11/22/2019    INR 1.78 11/05/2019    INR 2.1 11/04/2019    INR 1.9 10/28/2019    INR 2.1 10/21/2019         Assessment and Plan:  Status:    Anticoagulation Tx: At goal,No drug therapy problem.    At goal per protocol    Based on today's visit:   - New Weekly Dose: 60mg Total weekly dose.   - Percent Change: 0%    Please see \"[facility] Anticoagulation Flowsheet\"     Warfarin    See med list and patient instructions for dosing details.     Recheck INR: Other is recommended.   INR Recheck Date: 01/06/21 approximate.      Warfarin Weekly Dosing  Sunday Dose: 10 mg Monday Dose: 10 mg Tuesday Dose: 5 mg Wednesday Dose: 10 mg Thursday Dose: 5 mg Friday Dose: 10 mg Saturday Dose: 10 mg   Additional Week Dosing (will be blank, if not needed)                        .    Routed to PCP as an FYI of INR level and dosing.  Tj Palumbo [facility]    "

## 2020-11-20 ENCOUNTER — VIRTUAL VISIT (OUTPATIENT)
Dept: PSYCHOLOGY | Facility: CLINIC | Age: 70
End: 2020-11-20
Payer: COMMERCIAL

## 2020-11-20 DIAGNOSIS — F33.1 MODERATE EPISODE OF RECURRENT MAJOR DEPRESSIVE DISORDER (H): Primary | ICD-10-CM

## 2020-11-20 DIAGNOSIS — F41.1 GAD (GENERALIZED ANXIETY DISORDER): ICD-10-CM

## 2020-11-20 PROCEDURE — 90834 PSYTX W PT 45 MINUTES: CPT | Mod: 95 | Performed by: PSYCHOLOGIST

## 2020-11-20 NOTE — PROGRESS NOTES
"Behavioral Health Progress Note    Client Legal Name: Clarke Moreira   Client Preferred Name: Clarke   Service Type: Individual  Length of Visit: 8:23 am - 9:03 (40 minutes)  Attendees: patient     The following statement was read to the patient at the outset of this visit:     \"We have found that certain health care needs can be provided without the need for a face to face visit.  This service lets us provide the care you need with a phone conversation. I will have full access to your [facility] medical record during this entire phone call.   I will be taking notes for your medical record.  Since this is like an office visit, we will bill your insurance company for this service. There are potential benefits and risks of telephone visits (e.g. limits to patient confidentiality) that differ from in-person visits.?  Confidentiality still applies for telephone services, and nobody will record the visit.  It is important to be in a quiet, private space that is free of distractions (including cell phone or other devices) during the visit.??If during the course of the call I believe a telephone visit is not appropriate, you will not be charged for this service.\"     Consent has been obtained for this service by care team member: Yes     Emergency contact: Janice Dubon Emergency Room: [place]  Location at time of call: home      Identifying Information and Presenting Problem:    The patient is a 70 year old American male who I have seen off and on for therapy over the past couple of years.  Clarke did have difficulties at times coming into clinic for appointments as the anxiety would often lead him to cancel appointments.  Since the pandemic started and we have been doing phone visits. Clarke reports feeling much more comfortable in all of his appointments when they are done via telephone than when he is in person.  In person appointments with all providers have always caused significant distress " "due to his desire to be perceived in a particular way.  On the telephone, he feels as if he can better articulate his experience and what he is thinking as he feels less need to project a particular image to those that are caring for him.     Treatment Objective(s) Addressed in This Session:               Clarke will practice a healthy daily discipline of diet and exercise.    Clarke will make and keep appointments with appropriate time limits to reduce disabling anxiety about leaving the house.   Progress on / Status of Treatment Objective(s) / Homework  Describes ongoing mood symptoms and feelings of worthlessness    PHQ-9 SCORE 9/3/2019 1/6/2020 11/9/2020   PHQ-9 Total Score - - -   PHQ-9 Total Score 25 21 22       CHRIS-7 SCORE 12/11/2018 5/10/2019 9/3/2019   Total Score - - -   Total Score 20 13 19     Topics Discussed/Interventions Provided:   - Processed Clarke's emotional reaction to the ongoing political climate   - Examined negative thoughts Clarke is having about himself    - Worked together to reframe some of these thoughts into more helpful thoughts, that still felt realistic to him   - Provided positive reinforcement for his completion of the wellness visit with Dr. Sanchez  - Clarke shared that finances are strapped because he is ordering groceries online but can not use eBT with [employer].  Will look into some alternatives.        Assessment: The patient was engaged in the conversation today via phone.      Mental Status: Clarke sounded alert and oriented.  Speech was of normal rate and rhythm. Mood was described as \"ehhh\" today.  Affect was down, but appropriate to stated thoughts and presentation.  Thought processes were logical and coherent.  Thought content continues to include disparaging thoughts about himself, but no evidence of psychotic or paranoid features.  Reports ongoing passive suicidal thoughts, but denies any plan or any intent to do anything to harm himself.  Memory appeared " grossly intact. Insight and judgment appeared good and patient exhibited good impulse control during the appointment.     Does the patient appear to be at imminent risk of harm to self/others at this time? No    The session was necessary to continue negative thoughts he is having about himself.  Symptoms of depression and anxiety have continued to interfere with his ability to function at home and cause distress that can interfere with his ability to care for himself.  Ongoing psychotherapy is necessary to provide counseling and provide support.    Diagnosis (DSM-5):  Major Depression, recurrent, moderate  Generalized Anxiety Disorder  R/o persistent depressive disorder    Plan:  1. Follow up scheduled on 12/10  2. Look for resources for online order/delivery that may accept EBT  3. Provide info on [facility] visit[facility]ig program  4. Dx update at future appt.      NOTE: Treatment plan updated needed on 8/12/20.  Diagnostic assessment update due 10/15/19.

## 2020-11-27 ENCOUNTER — TELEPHONE (OUTPATIENT)
Dept: ORTHOPEDICS | Facility: CLINIC | Age: 70
End: 2020-11-27

## 2020-11-27 NOTE — TELEPHONE ENCOUNTER
Called and left voicemail for patient. Informed patient that due to the increase in Covid numbers, Dr. Lagos has switched his Monday clinics to virtual. I informed patient that he does have openings on 12-9-2020.    Advised patient that he should call back to re-schedule.

## 2020-11-30 ENCOUNTER — TELEPHONE (OUTPATIENT)
Dept: FAMILY MEDICINE | Facility: CLINIC | Age: 70
End: 2020-11-30
Payer: COMMERCIAL

## 2020-11-30 NOTE — TELEPHONE ENCOUNTER
Message routed to PCP to review and advise if any changes need to be made.    Carla Gonzales RN     Recommended artificial tears to use: 1 drop 4x a day in both eyes.

## 2020-11-30 NOTE — TELEPHONE ENCOUNTER
[facility] phone call message- patient requesting to speak directly with PCP or provider.    PCP: Matthias Sanchez    Reason for Call: Patient requesting a call back stating they have a couple of issues to continue discussing since they last conversation was cut short.     Additional Details:     Are you willing to speak with a nurse? Yes    Is a call back needed? Yes    Patient informed that it may take up to 2 business days to hear back from PCP:Yes    OK to leave a message on voice mail? Yes    Primary language: English      needed? No    Call taken on November 30, 2020 at 10:22 AM by Susan Gamble route to PCP unless willing to speak with a nurse.

## 2020-11-30 NOTE — TELEPHONE ENCOUNTER
"Rn spoke to patient to discuss his questions:    Patient requesting Dr. Sanchez place order for in office colonoscopy (does not want the at home test).     Patient states he spoke with [facility] last week and they told him he needs a new order from his doctor for CPAP machine and supplies. RN can fax to them once this is placed.     Patient also states that Dr. Sanchez had mentioned something about a \"shingles regimen\" but that they didn't get to discuss it fully. Patient is wondering what Dr. Sanchez's recommendations around this are.     Routing to PCP to place orders if appropriate.   Elena Lora, RN     "

## 2020-12-03 NOTE — TELEPHONE ENCOUNTER
RN printed and faxed CPAP DME order from 11/9/20 to [facility] ([phone]) as requested by patient.   Elena Lora, RN

## 2020-12-10 ENCOUNTER — VIRTUAL VISIT (OUTPATIENT)
Dept: PSYCHOLOGY | Facility: CLINIC | Age: 70
End: 2020-12-10
Payer: COMMERCIAL

## 2020-12-10 DIAGNOSIS — F33.1 MODERATE EPISODE OF RECURRENT MAJOR DEPRESSIVE DISORDER (H): Primary | ICD-10-CM

## 2020-12-10 DIAGNOSIS — F41.1 GAD (GENERALIZED ANXIETY DISORDER): ICD-10-CM

## 2020-12-10 PROCEDURE — 90834 PSYTX W PT 45 MINUTES: CPT | Mod: 95 | Performed by: PSYCHOLOGIST

## 2020-12-10 NOTE — PROGRESS NOTES
"Behavioral Health Progress Note    Client Legal Name: Clarke Moreira   Client Preferred Name: Clarke   Service Type: Individual  Length of Visit: 8:17 - 9:02 (45 minutes)  Attendees: patient     The following statement was read to the patient at the outset of this visit:     \"We have found that certain health care needs can be provided without the need for a face to face visit.  This service lets us provide the care you need with a phone conversation. I will have full access to your [facility] medical record during this entire phone call.   I will be taking notes for your medical record.  Since this is like an office visit, we will bill your insurance company for this service. There are potential benefits and risks of telephone visits (e.g. limits to patient confidentiality) that differ from in-person visits.?  Confidentiality still applies for telephone services, and nobody will record the visit.  It is important to be in a quiet, private space that is free of distractions (including cell phone or other devices) during the visit.??If during the course of the call I believe a telephone visit is not appropriate, you will not be charged for this service.\"     Consent has been obtained for this service by care team member: Yes     Emergency contact: Jancie Dubon Emergency Room: [place]  Location at time of call: home      Identifying Information and Presenting Problem:    The patient is a 70 year old American male who I have seen off and on for therapy over the past couple of years.  Clarke did have difficulties at times coming into clinic for appointments as the anxiety would often lead him to cancel appointments.  Since the pandemic started and we have been doing phone visits. Clarke reports feeling much more comfortable in all of his appointments when they are done via telephone than when he is in person.  In person appointments with all providers have always caused significant distress due " to his desire to be perceived in a particular way.  On the telephone, he feels as if he can better articulate his experience and what he is thinking as he feels less need to project a particular image to those that are caring for him.     Treatment Objective(s) Addressed in This Session:               Clarke will practice a healthy daily discipline of diet and exercise.    Clarke will learn to manage anxiety so he can make and keep appointments   The Treatment Plan was reviewed with the patient at today s visit.  The Treatment Plan remains current based on the patient s status and progress to date.  Progress on / Status of Treatment Objective(s) / Homework  Describes ongoing mood symptoms and feelings of worthlessness    PHQ-9 SCORE 9/3/2019 1/6/2020 11/9/2020   PHQ-9 Total Score - - -   PHQ-9 Total Score 25 21 22       CHRIS-7 SCORE 12/11/2018 5/10/2019 9/3/2019   Total Score - - -   Total Score 20 13 19     Topics Discussed/Interventions Provided:  - Frustrated with himself because seeing old patterns. Missed his podiatry appt yesterday because the anxiety became too high. His feet hurt terribly and he is terrified that they will tell him that he needs an amputation.  As he thinks about the possibility of an amputation, this brings up all the thoughts of the wrong decisions he has made through his life that has brought him to this place where he even is having these concerns with his feet.    - Examined the vicious cycle that he finds himself in anxiety > fearful thoughts > cancel appts > more thoughts because he doesn't have the information from the appt > more anxiety.  Recognizes this cycle, but also feels as if he uses it as an excuse for not making his way in the world.  Feels like all roads lead back to those formative years. Explored how these behaviors were quite protective and useful when he was growing up. Continued to challenge and reframe negative self-thoughts.  - difficulties with cleaning his home.   "Very frustrated and ashamed of himself.  States the \"violence of the shame is directed only at me\"  Not physical violence, feels the things he says to himself are very shaming.  - reviewed treatment plan goals and states these continue to be the goals he would like to work towards.  - shared information on [employer] and [employer] acceping ebt for groceries  - discussed [employer] [facility] program with Clarke - for him to either get a volunteer visitor (which is phoen calls right now) or to be a volunteer caller.    Assessment: The patient was engaged in the conversation today via phone.      Mental Status: Clarke sounded alert and oriented.  Speech was of normal rate and rhythm. Mood was described as depressed.  Affect was down, but appropriate to stated thoughts and presentation.  Thought processes were logical and coherent.  Thought content continues to include disparaging and shaming thoughts toward himself, but no evidence of psychotic or paranoid features.  Reports ongoing passive suicidal thoughts, but denies any plan or any intent to do anything to harm himself.  Memory appeared grossly intact. Insight and judgment appeared good and patient exhibited good impulse control during the appointment.     Does the patient appear to be at imminent risk of harm to self/others at this time? No    The session was necessary to challenge and reframe negative thoughts Clarke is having about himself.  Symptoms of depression and anxiety have continued to interfere with his ability to function at home and cause distress that can interfere with his ability to care for himself.  Ongoing psychotherapy is necessary to provide counseling and provide support.    Diagnosis (DSM-5):  Major Depression, recurrent, moderate  Generalized Anxiety Disorder  R/o persistent depressive disorder    Plan:  1. Follow up scheduled on 1/7  2. Encouraged him to make a Podiatry follow up  3. Try out [employer] or [employer] for EBT - does not want " to use [employer], not sure about figuring out [employer]  4. Gave info on [employer] - he will look at website  5. Reviewed treatment plan today.   6. Need to update diagnostic      NOTE: Treatment plan update needed on 3/10/21.  Diagnostic assessment update due 10/15/19.  The Treatment Plan was reviewed with the patient at today s visit.  The Treatment Plan remains current based on the patient s status and progress to date.

## 2020-12-24 ENCOUNTER — TELEPHONE (OUTPATIENT)
Dept: SLEEP MEDICINE | Facility: CLINIC | Age: 70
End: 2020-12-24

## 2020-12-24 NOTE — TELEPHONE ENCOUNTER
I spoke with the pt today 12/24/20 at 10:29 am and per pt he the tubing is broken and there are some small pieces in the tubing connection part in the machine. He is not sure what he did but it's broken. He also is wheel chair bound and does not have anyone to help bring in his machine. He also uses O2 and has recently got standard tubing. I let him know I will reach out to upper management to see what we can do for him and we will call him back. (I emailed Elaine MCCALL and she was ok to give pt correct tubing to use with his O2 and to zero bill. She also wanted me to check with RT's if they can go to the pt's home. I contacted Jsoh VELEZ RT and explained to him the issues and he will take a look at this and call the pt.)

## 2020-12-28 DIAGNOSIS — E78.5 HYPERLIPIDEMIA LDL GOAL <100: ICD-10-CM

## 2020-12-28 NOTE — TELEPHONE ENCOUNTER
"Request for medication refill:  Fish oil capsule (Omega-3) 1000 mg    Providers if patient needs an appointment and you are willing to give a one month supply please refill for one month and  send a letter/MyChart using \".SMILLIMITEDREFILL\" .smillimited and route chart to \"P SMI \" (Giving one month refill in non controlled medications is strongly recommended before denial)    If refill has been denied, meaning absolutely no refills without visit, please complete the smart phrase \".smirxrefuse\" and route it to the \"P SMI MED REFILLS\"  pool to inform the patient and the pharmacy.    Suri Lawson, EMT        "

## 2020-12-29 DIAGNOSIS — I50.30 HEART FAILURE WITH PRESERVED EJECTION FRACTION, UNSPECIFIED HF CHRONICITY (H): ICD-10-CM

## 2020-12-29 RX ORDER — OMEGA-3 FATTY ACIDS/FISH OIL 300-1000MG
1 CAPSULE ORAL DAILY
Qty: 30 CAPSULE | Refills: 11 | Status: SHIPPED | OUTPATIENT
Start: 2020-12-29 | End: 2021-01-14

## 2021-01-04 RX ORDER — SPIRONOLACTONE 25 MG/1
50 TABLET ORAL DAILY
Qty: 180 TABLET | Refills: 0 | Status: SHIPPED | OUTPATIENT
Start: 2021-01-04 | End: 2021-03-26

## 2021-01-04 NOTE — TELEPHONE ENCOUNTER
Last Clinic Visit: 11/12/2020 [facility]    Spironolactone: BP above protocol parameters - refill sent for 90 days supply and FYI to Cardiology.    BP Readings from Last 1 Encounters:   11/09/20 (!) 149/77

## 2021-01-07 ENCOUNTER — VIRTUAL VISIT (OUTPATIENT)
Dept: PSYCHOLOGY | Facility: CLINIC | Age: 71
End: 2021-01-07
Payer: COMMERCIAL

## 2021-01-07 DIAGNOSIS — F41.1 GAD (GENERALIZED ANXIETY DISORDER): ICD-10-CM

## 2021-01-07 DIAGNOSIS — F33.1 MODERATE EPISODE OF RECURRENT MAJOR DEPRESSIVE DISORDER (H): Primary | ICD-10-CM

## 2021-01-07 PROCEDURE — 90834 PSYTX W PT 45 MINUTES: CPT | Mod: 95 | Performed by: PSYCHOLOGIST

## 2021-01-07 NOTE — PROGRESS NOTES
"Behavioral Health Progress Note    Client Legal Name: Clarke Moreira   Client Preferred Name: Clarke   Service Type: Individual  Length of Visit: 8:20 - 9:03 (43 minutes)  Attendees: patient     The following statement was read to the patient at the outset of this visit:     \"We have found that certain health care needs can be provided without the need for a face to face visit.  This service lets us provide the care you need with a phone conversation. I will have full access to your [facility] medical record during this entire phone call.   I will be taking notes for your medical record.  Since this is like an office visit, we will bill your insurance company for this service. There are potential benefits and risks of telephone visits (e.g. limits to patient confidentiality) that differ from in-person visits.?  Confidentiality still applies for telephone services, and nobody will record the visit.  It is important to be in a quiet, private space that is free of distractions (including cell phone or other devices) during the visit.??If during the course of the call I believe a telephone visit is not appropriate, you will not be charged for this service.\"     Consent has been obtained for this service by care team member: Yes     Emergency contact: Janice Dubon Emergency Room: [place]  Location at time of call: home      Identifying Information and Presenting Problem:    The patient is a 70 year old American male who I have seen off and on for therapy over the past couple of years.  Clarke did have difficulties at times coming into clinic for appointments as the anxiety would often lead him to cancel appointments.  Since the pandemic started and we have been doing phone visits, Clarke reports feeling much more comfortable in all of his appointments when they are done via telephone than when he is in person.  In person appointments with all providers have always caused significant distress due " to his desire to be perceived in a particular way.  On the telephone, he feels as if he can better articulate his experience and what he is thinking as he feels less need to project a particular image to those that are caring for him.  Additionally, he has legitimate concerns about his chronic illnesses and increased vulnerability to COVID, so he prefers to do as many appointments from home that he is able.     Treatment Objective(s) Addressed in This Session:               Clarke will practice a healthy daily discipline of diet and exercise.    Clarke will learn to manage anxiety so he can make and keep appointments   Progress on / Status of Treatment Objective(s) / Homework  Reports symptoms are about the same    PHQ-9 SCORE 9/3/2019 1/6/2020 11/9/2020   PHQ-9 Total Score - - -   PHQ-9 Total Score 25 21 22       CHRIS-7 SCORE 12/11/2018 5/10/2019 9/3/2019   Total Score - - -   Total Score 20 13 19     Topics Discussed/Interventions Provided:  - ongoing frustration with ipad.  Clicked on something that caused it to get a virus. Mad that this happened.  Old thoughts of how he can't do anything right started to come up.  Was able to identify the thoughts and continue to problem solve next steps.  Contacted [employer], as well as one other place trying to resolve the problem.  It's still ongoing, trying to figure out next steps.  Provided positive reinforcement for the steps he had taken to try to move this forward and not giving up.  Encouraged him to reflect on this and to acknowledge the positive way he approached this situation.  Clarke identifies that he had not thought about it that way.  Reluctant to acknowledge something good for himself, but also willing to think about this some more.    Assessment: The patient was engaged in the conversation today via phone.      Mental Status: Clarke sounded alert and oriented.  Speech was of normal rate and rhythm. Mood was described as down, upset with the events that  occurred yesteray.  Affect was appropriate to stated thoughts and presentation.  Thought processes were logical and coherent.  Thought content continues to include disparaging and shaming thoughts toward himself, but no evidence of psychotic or paranoid features.  Reports ongoing passive suicidal thoughts, but denies any plan or any intent to do anything to harm himself.  Memory appeared grossly intact. Insight and judgment appeared good and patient exhibited good impulse control during the appointment.     Does the patient appear to be at imminent risk of harm to self/others at this time? No    The session was necessary to challenge and reframe negative thoughts Clarke is having about himself.  Symptoms of depression and anxiety have continued to interfere with his ability to function at home and cause distress that can interfere with his ability to care for himself.  Ongoing psychotherapy is necessary to provide counseling and provide support.    Diagnosis (DSM-5):  Major Depression, recurrent, moderate  Generalized Anxiety Disorder  R/o persistent depressive disorder    Plan:  1. Follow up scheduled on 1/28  2. Encouraged him to make a Podiatry follow up  3. Check in about [facility] [employer] [facility]  4. Need to update diagnostic      NOTE: Treatment plan update needed on 3/10/21.  Diagnostic assessment update due 10/15/19.

## 2021-01-14 RX ORDER — OMEGA-3 FATTY ACIDS/FISH OIL 300-1000MG
1 CAPSULE ORAL DAILY
Qty: 30 CAPSULE | Refills: 11 | Status: SHIPPED | OUTPATIENT
Start: 2021-01-14 | End: 2021-12-30

## 2021-01-14 NOTE — TELEPHONE ENCOUNTER
Received a call from pharmacist at [facility] who stated that they never received prescription for fish oil. New order sent.    Lety Ventura RN

## 2021-01-26 DIAGNOSIS — K59.00 CONSTIPATION, UNSPECIFIED CONSTIPATION TYPE: ICD-10-CM

## 2021-01-26 RX ORDER — AMOXICILLIN 250 MG
1-2 CAPSULE ORAL 2 TIMES DAILY PRN
Qty: 60 TABLET | Refills: 3 | Status: SHIPPED | OUTPATIENT
Start: 2021-01-26 | End: 2021-05-26

## 2021-01-26 NOTE — TELEPHONE ENCOUNTER
"Request for medication refill:  senna-docusate (SENOKOT-S/PERICOLACE) 8.6-50 MG tablet    Providers if patient needs an appointment and you are willing to give a one month supply please refill for one month and  send a letter/MyChart using \".SMILLIMITEDREFILL\" .smillimited and route chart to \"P SMI \" (Giving one month refill in non controlled medications is strongly recommended before denial)    If refill has been denied, meaning absolutely no refills without visit, please complete the smart phrase \".smirxrefuse\" and route it to the \"P SMI MED REFILLS\"  pool to inform the patient and the pharmacy.    Soni Borja MA        "

## 2021-01-28 ENCOUNTER — VIRTUAL VISIT (OUTPATIENT)
Dept: PSYCHOLOGY | Facility: CLINIC | Age: 71
End: 2021-01-28
Payer: COMMERCIAL

## 2021-01-28 DIAGNOSIS — F33.1 MODERATE EPISODE OF RECURRENT MAJOR DEPRESSIVE DISORDER (H): Primary | ICD-10-CM

## 2021-01-28 DIAGNOSIS — F41.1 GAD (GENERALIZED ANXIETY DISORDER): ICD-10-CM

## 2021-01-28 PROCEDURE — 90834 PSYTX W PT 45 MINUTES: CPT | Mod: 95 | Performed by: PSYCHOLOGIST

## 2021-01-28 NOTE — PROGRESS NOTES
"Behavioral Health Progress Note    Client Legal Name: Clarke Moreira   Client Preferred Name: Clarke   Service Type: Individual  Length of Visit: 8:20 - 9:00 (40 minutes)  Attendees: patient     The following statement was read to the patient at the outset of this visit:     \"We have found that certain health care needs can be provided without the need for a face to face visit.  This service lets us provide the care you need with a phone conversation. I will have full access to your [facility] medical record during this entire phone call.   I will be taking notes for your medical record.  Since this is like an office visit, we will bill your insurance company for this service. There are potential benefits and risks of telephone visits (e.g. limits to patient confidentiality) that differ from in-person visits.?  Confidentiality still applies for telephone services, and nobody will record the visit.  It is important to be in a quiet, private space that is free of distractions (including cell phone or other devices) during the visit.??If during the course of the call I believe a telephone visit is not appropriate, you will not be charged for this service.\"     Consent has been obtained for this service by care team member: Yes     Emergency contact: Janice Dubon Emergency Room: [place]  Location at time of call: home      Identifying Information and Presenting Problem:    The patient is a 70 year old American male who I have seen off and on for therapy over the past couple of years.  Clarke did have difficulties at times coming into clinic for appointments as the anxiety would often lead him to cancel appointments.  Since the pandemic started and we have been doing phone visits, Clarke reports feeling much more comfortable in all of his appointments when they are done via telephone than when he is in person.  In person appointments with all providers have always caused significant distress due " to his desire to be perceived in a particular way.  On the telephone, he feels as if he can better articulate his experience and what he is thinking as he feels less need to project a particular image to those that are caring for him.  Additionally, he has legitimate concerns about his chronic illnesses and increased vulnerability to COVID, so he prefers to do as many appointments from home that he is able.     Treatment Objective(s) Addressed in This Session:               Clarke will practice a healthy daily discipline of diet and exercise.    Clarke will learn to manage anxiety so he can make and keep appointments   Progress on / Status of Treatment Objective(s) / Homework  Reports that he has been doing OK    PHQ-9 SCORE 9/3/2019 1/6/2020 11/9/2020   PHQ-9 Total Score - - -   PHQ-9 Total Score 25 21 22       CHRIS-7 SCORE 12/11/2018 5/10/2019 9/3/2019   Total Score - - -   Total Score 20 13 19     Topics Discussed/Interventions Provided:  Clarke shared some pleasant experiences he has had in the last few weeks.  These have buoyed his spirits a little and have given him some respite from the disparaging thoughts he can have about himself.    Overwhelmed with trying to recertify to maintain his housing. Discussed breaking it down into smaller pieces and focusing on the one small next piece to complete, rather than thinking about the overall picture.    Provided positive reinforcement for the perseverance that Clarke has continued to demonstrate with his ipad.  He was able to problem solve how to get rid of the virus that it had and it's functional again.  He was rightfully quite excited about this accomplishment.    Session primarily focused on his functioning ability in day to day life.  Clarke has not been following up with needed medical appointments, struggles to keep his house clean, has been wearing his compression socks for 2 1/2 weeks because he can't change them on his own, groceries continue to be a  "struggle. For political reasons he does not want to order food from [employer] or [employer], but these are the only places that provide online ordering and take ebt.  He has lots of money on ebt card because he can't use it but has some financial struggles due to paying for his food.  Framed this in terms of finding ways to support him to be as independent as possible as Clarke will typically interpret these conversations as proof of his inability to function as an adult.  He was receptive to the idea of receiving some additional help.     Assessment: The patient was engaged in the conversation today via phone.      Mental Status: Clarke sounded groggy and and uncertain at the outset of the appt.  I thought perhaps I had woken him up, but he said he was up. Had received a phone call from a social security scammer and thought maybe I was them calling back.  Took a few minutes for him to seem back to his baseline cognition level.  Speech was of normal rate and rhythm. Mood was described as \"OK, as it goes for me\".  Affect was appropriate to stated mood and presentation.  Thought processes were logical and coherent.  Thought content continues to include disparaging and shaming thoughts toward himself, but no evidence of psychotic or paranoid features.  Reports ongoing passive suicidal thoughts, but denies any plan or any intent to do anything to harm himself.  Memory appeared grossly intact. Insight and judgment appeared good and patient exhibited good impulse control during the appointment.     Does the patient appear to be at imminent risk of harm to self/others at this time? No    The session was necessary to focus on the functional challenges that Clarke is experiencing and providing some options that may be available to him in a manner that highlights how they will support/not undermine his independence. Symptoms of depression and anxiety have continued to interfere with his ability to function at home and cause " distress that can interfere with his ability to care for himself.  Ongoing psychotherapy is necessary to provide counseling and provide support.    Diagnosis (DSM-5):  Major Depression, recurrent, moderate  Generalized Anxiety Disorder  R/o persistent depressive disorder    Plan:  1. Follow up scheduled on 2/11  2. Spoke to Charis, clinic , about possible services that Clarke might qualify for.  She looked at his insurance and thought he would likely need to get on a supplemental medicare plan in order to have coverage for those services.  She is planning to call Clarke to discuss this further.  3.  Clarke also needs to make a cardiology appt and podiatry appt - talked to Charis about this.  Maybe helpful to have him in [facility] so that he can receive some additional asistance with getting these scheduled.  4. Did not check in about a [employer] [facility] volunteer today due to the many other services we were discussing  5. Need to update diagnostic      NOTE: Treatment plan update needed on 3/10/21.  Diagnostic assessment update due 10/15/19.

## 2021-02-11 ENCOUNTER — TELEPHONE (OUTPATIENT)
Dept: FAMILY MEDICINE | Facility: CLINIC | Age: 71
End: 2021-02-11

## 2021-02-11 ENCOUNTER — VIRTUAL VISIT (OUTPATIENT)
Dept: PSYCHOLOGY | Facility: CLINIC | Age: 71
End: 2021-02-11
Payer: COMMERCIAL

## 2021-02-11 DIAGNOSIS — F41.1 GAD (GENERALIZED ANXIETY DISORDER): ICD-10-CM

## 2021-02-11 DIAGNOSIS — F33.1 MODERATE EPISODE OF RECURRENT MAJOR DEPRESSIVE DISORDER (H): Primary | ICD-10-CM

## 2021-02-11 PROCEDURE — 90834 PSYTX W PT 45 MINUTES: CPT | Mod: 95 | Performed by: PSYCHOLOGIST

## 2021-02-11 NOTE — TELEPHONE ENCOUNTER
Social Work Phone Call    2021   2:10p & 2:15p    Data/Intervention:  Patient Name:  Clarke Moreira  /Age:  1950 (70 year old)    Spoke with: Clarke Samson & [facility] MA    Reason for Call:  Insurance Follow Up      Assessment/ Plan:  SW talked with Clarke Samson from [employer]. SW found out from him that current he is on straight Medicare. In order to be part of [facility] he would need to also qualify for medical assistance ([place]). He recommended calling [facility] and seeing if he could qualify as he did in the past.     SW called [facility] and found out that he does not qualify because the income limit is around $844 per month and he gets $1176 dollars per month. He could qualify if he was willing to do a monthly spenddown (He would have to pay county monthly) of the extra $ 300 dollar he makes per month.     SW also did research and he would not qualify for a waiver such as the Elderly Waiver (EW) without being on Medical Assistance.     YAAKOV Gonzalez  Social Work Care Coordinator

## 2021-02-11 NOTE — TELEPHONE ENCOUNTER
Social Work Phone Call    2021   2:01 PM    Data/Intervention:  Patient Name:  Clarke Moreira  /Age:  1950 (70 year old)    Spoke with: Clarke Moreira    Reason for Call:   SW Supports F/U    Assessment:  MERVTA and Clarke discussed that he would like the following services if he could get them Homecare Health, Skilled Nursing, Homemaking, ILS,Groceries delivered. MERVAT got Clarke's permission to contact his insurance to see what she could do assist him in being able to obtain extar supports.     SW asked Clarke for specific regarding incomes and assets that assist in determining eligibility.  - No assets (Later disclosed $20,000 in bank)  - Monthly RSDI $1167    Plan:  SW is going to see if I can get him on [facility] or if there are any other routes to get him the services he needs.     YAAKOV Gonzalez  Social Work Care Coordinator

## 2021-02-11 NOTE — Clinical Note
Luis Vizcaino - Just wanted to route Clarke's chart to you.  I asked Elena to call him to check in on his legs and she did so today.  Thank you so much for reaching out to him!  Very much appreciated.

## 2021-02-11 NOTE — PROGRESS NOTES
[facility]   Patient is currently open to home care services with [facility].  The patient is currently receiving      RN services.  [facility]  and team have been notified of patient admission.  [facility] liaison will continue to follow patient during stay.  If appropriate provide orders in epic to resume home care at time of discharge.       English

## 2021-02-11 NOTE — PROGRESS NOTES
"Behavioral Health Progress Note    Client Legal Name: Clarke Moreira   Client Preferred Name: Clarke   Service Type: Individual  Length of Visit:  9:10 - 9:53 (43 minutes)  Attendees: patient     The following statement was read to the patient at the outset of this visit:     \"We have found that certain health care needs can be provided without the need for a face to face visit.  This service lets us provide the care you need with a phone conversation. I will have full access to your [facility] medical record during this entire phone call.   I will be taking notes for your medical record.  Since this is like an office visit, we will bill your insurance company for this service. There are potential benefits and risks of telephone visits (e.g. limits to patient confidentiality) that differ from in-person visits.?  Confidentiality still applies for telephone services, and nobody will record the visit.  It is important to be in a quiet, private space that is free of distractions (including cell phone or other devices) during the visit.??If during the course of the call I believe a telephone visit is not appropriate, you will not be charged for this service.\"     Consent has been obtained for this service by care team member: Yes     Emergency contact: Janice Dubon Emergency Room: [place]  Location at time of call: home      Identifying Information and Presenting Problem:    The patient is a 70 year old American male who I have seen off and on for therapy over the past couple of years.  Clarke did have difficulties at times coming into clinic for appointments as the anxiety would often lead him to cancel appointments.  Since the pandemic started and we have been doing phone visits, Clarke reports feeling much more comfortable in all of his appointments when they are done via telephone than when he is in person.  In person appointments with all providers have always caused significant distress due " "to his desire to be perceived in a particular way.  On the telephone, he feels as if he can better articulate his experience and what he is thinking as he feels less need to project a particular image to those that are caring for him.  Additionally, he has legitimate concerns about his chronic illnesses and increased vulnerability to COVID, so he prefers to do as many appointments from home as possible    Treatment Objective(s) Addressed in This Session:               Clarke will practice a healthy daily discipline of diet and exercise.    Clarke will learn to manage anxiety so he can make and keep appointments   Progress on / Status of Treatment Objective(s) / Homework  Stable, status about the same    PHQ-9 SCORE 9/3/2019 1/6/2020 11/9/2020   PHQ-9 Total Score - - -   PHQ-9 Total Score 25 21 22       CHRIS-7 SCORE 12/11/2018 5/10/2019 9/3/2019   Total Score - - -   Total Score 20 13 19     Topics Discussed/Interventions Provided:  Let Clarke know that when I talked to him last time and told him that the  would call, I was not aware that she was out of the office.  Talked with the  again now that she is back and she will give Ryley call to discuss some options for additional services to help function better at home.      Clarke was feeling very frustrated with his difficulties in caring for himself.  He reports that he took off his compression socks and bandages for the first time in over 30 days.  States it was a terrible process and was very upset with himself about the state of his legs/feet.  He reports that \"they are just a mess.\"  He is having a hard time walking because of the pain in his feet.  He made an appt with Dr. Sanchez for 2/15 and the podiatrist for 2/23.  But, then he cancelled the Laura appt.  The anxious and shaming thoughts came up about his inability to care for himself so he cancelled the appointment.  Asked Clarke to trace the chain of behaviors/thoughts that " "led from making the appointment to canceling the appointment.  Reports that he started to think about Dr. Sanchez and what a champion he has been for Clarke, so he decided to make the appt  So Dr. Sanchez could look at his legs - was hoping to have an appt within a day or two.  Nothing was open.  Made appt for 2/15 anyway.  After making the appointment started to feel badly about himself and his legs, had thoughts of \"what if I go in and he can't do anything to help or sends me to the hospital\"  Clarke reports that every dark scenario comes up.  He invests so much in these bad outcomes that he can't even picture any possible good outcomes that will happen.  He then decided to remove the bandages and after seeing his legs, felt as if the bad outcomes were more likely to be true, so he cancelled the appt.  Had a hortensia discussion with Clarke about what we need to have in place to ensure that he attends the appt with the podiatrist.  Clarke felt as if he was at high risk to cancel that appt as well.  After some discussion, the plan we decided on was that I would call Clarke on 2/21 (the day before) so we can address any of the negative thoughts he is having.  He has promised he will not cancel the appt at any point before we talk.      Clarke shared an experience he had in regard to two times that he reached out to a couple of people that had lost their spouses. Was concerned that he may have done something wrong because of a response he received from one person. Challenged some of his thoughts about this situation and discussed some alternative thoughts/explanation for the response.  Provided positive reinforcement for his willingness to reach out and makes these contacts.      Assessment: The patient was engaged in the conversation today via phone.      Mental Status: Clarke sounded alert and oriented.  groggy and and uncertain at the outset of the appt.  Speech was of normal rate and rhythm. Mood was described " "as \"OK.\"   Affect was appropriate to stated mood and presentation.  Thought processes were logical and coherent.  Thought content continues to include disparaging and shaming thoughts toward himself, but no evidence of psychotic or paranoid features.  Reports ongoing passive suicidal thoughts, but denies any plan or any intent to do anything to harm himself.  Memory appeared grossly intact. Insight and judgment appeared good and patient exhibited good impulse control during the appointment.     Does the patient appear to be at imminent risk of harm to self/others at this time? No    The session was necessary to focus on the functional challenges that Clarke is experiencing and how his neagtive thoughts and depression make it even more difficult for him to reach out and receive the care he needs.  Symptoms of depression and anxiety have continued to interfere with his ability to function at home and cause distress that can interfere with his ability to care for himself.  Ongoing psychotherapy is necessary to provide counseling and provide support.    Diagnosis (DSM-5):  Major Depression, recurrent, moderate  Generalized Anxiety Disorder  R/o persistent depressive disorder    Plan:  1. Will call Clarke on 2/21 at 12:30   2. Spoke to Charis, clinic , about possible services that Clarke might qualify for.  She looked at his insurance and thought he would likely need to get on a supplemental medicare plan in order to have coverage for those services.  She is planning to call Clarke to discuss this further.  3.  Clarke made podiatry appt, will call on 2/21 to try to help Clarke not cancel this appt.  4. Asked the clinic nurse, Elena, to reach out to Clarke to discuss his legs further to make sure they were not infected or didn't need more immediate attention.    5. Clarke still needs a cardiology appt   Clarke also needs to make a cardiology appt and podiatry appt - talked to Charis about this.  " Maybe helpful to have him in [facility] so that he can 6. . Did not check in about a [employer] elderly volunteer today due to the many other services we were discussing  7. Need to update diagnostic      NOTE: Treatment plan update needed on 3/10/21.  Diagnostic assessment update due 10/15/19.

## 2021-02-11 NOTE — TELEPHONE ENCOUNTER
Social Work Phone Call    2021   11:18 AM    Data/Intervention:  Patient Name:  Clarke Moreira  /Age:  1950 (70 year old)    Reason for Call:  Social Work Supports F/U     Assessment/ Plan:  SW tried calling Clarke 2 times. She received busy signal both times. SW will plan to try and call Clarke again this afternoon.     Charis Alvarado [facility]  Social Work Care Coordinator

## 2021-02-12 NOTE — TELEPHONE ENCOUNTER
Social Work Phone Call    2021   2:46 PM    Data/Intervention:  Patient Name:  Clarke Moreira  /Age:  1950 (70 year old)    Spoke with: Clarke Harish    Reason for Call:  Social Work Supports F/U     Assessment/ Plan:  SW talked with Clrake about what she had found out from [facility] and the [facility]. When SW explained the spenddown Clarke was adamant that that was not an option. SW stated she would continue to see if she could find any other routes and would contact him if she found anything.     Charis Alvarado, [place]  Social Work Care Coordinator

## 2021-02-17 ENCOUNTER — TELEPHONE (OUTPATIENT)
Dept: FAMILY MEDICINE | Facility: CLINIC | Age: 71
End: 2021-02-17

## 2021-02-17 NOTE — TELEPHONE ENCOUNTER
Called patient per Dr Buenrostro's request to schedule appointments for patient, as appointments are now available for March and early April.    Patient advised that he has an appointment with Dr Buenrostro at 12:30pm on 2/22/21. Do not see appointment in [facility].    Please confirm and call patient to advise.    Offered to schedule appointments for March. Patient declined at this time.

## 2021-02-18 NOTE — PATIENT INSTRUCTIONS
David Lawler is a 63 year old who presents for the following health issues     HPI     Acute Illness   Acute illness concerns: slight cough  Onset: about a week     Fever: no    Chills/Sweats: YES- chills, but not sure if due to temperature.     Headache (location?): no    Sinus Pressure:no    Conjunctivitis:  YES- watery eyes    Ear Pain: no    Rhinorrhea: YES    Congestion: YES- a little    Sore Throat: no      Cough: YES-non-productive    Wheeze: no    Decreased Appetite: no    Nausea: no    Vomiting: no    Diarrhea:  no    Dysuria/Freq.: no    Fatigue/Achiness: no    Sick/Strep Exposure: no     Therapies Tried and outcome: none today, tylenol    He has had a slight cough, runny nose and some chills over the past week or so. He denies a known fever or shortness of breath. He does not leave his house very often.    He has not followed up with hematology since last year for his polycythemia and was only on anticoagulation for his PE for a few months. He denies any recurrent hemoptysis or significant shortness of breath.    Review of Systems   Constitutional, HEENT, cardiovascular, pulmonary, gi and gu systems are negative, except as otherwise noted.      Objective    BP (!) 170/92   Pulse 70   Temp 98.7  F (37.1  C)   Resp 16   SpO2 98%   There is no height or weight on file to calculate BMI.  Physical Exam   GENERAL: healthy, alert and no distress  EYES: Eyes grossly normal to inspection, PERRL and conjunctivae and sclerae normal  HENT: ear canals and TM's normal, nasal mucosa and pharynx are clear. ~1 cm skin colored, partially firm, nontender, round nodule over the right buccal mucosa just inside lower lip.  NECK: no adenopathy, no asymmetry, masses, or scars and thyroid normal to palpation  RESP: lungs clear to auscultation - no rales, rhonchi or wheezes  CV: regular rate and rhythm, normal S1 S2, no S3 or S4, no murmur, click or rub, no peripheral edema and peripheral pulses  You were seen today in the Cardiovascular Clinic at the [facility].   Cardiology Providers you saw during your visit:    Dr. Christian Willoughby  Recommendations:   The etiology of the patients heart failure is likely multi-factorial relating to abnormalities of relations, obesity, alveolar hypoventilation.    The patient's weight is currently quite high and probably would benefit from diuresis to below 400 pounds and right heart catherization to define point of lesions.    Discussed in-patient right heart catherization directed therapy vis a vis twice a week clinic visits for laboratories   Will obtain new baseline laboratories to see if home treatment is an option.   There is a high likelihood that he will develop urinary retention that will require further evaluation and possible self cathing under urologoic supervision    Needs therapist and to call Dilia Greene at [facility]    Will call patient after laboratories available    May well need PPV during day until weight /volume adjusted.  Will see sleep on Friday  Follow-up:   We will discuss follow up after we review your labs and make a decision on your diuretics.    For emergencies call 911.     If you have any questions regarding your visit please contact your care team:     Ro Aguilar RN  [facility]  Cardiology Care Coordinator-Heart Failure    Appointment scheduling or nurse questions: [phone]    On Call Cardiologist for after hours or on weekends: [phone]    option #4    If you need a medication refill please contact your pharmacy.  Please allow 3 business days for your refill to be completed.    As always, thank you for trusting us with your health care needs!   strong    Assessment & Plan       ICD-10-CM    1. Person under investigation for COVID-19  Z20.822    2. Lesion of mouth  K13.70 Symptomatic COVID-19 Virus (Coronavirus) by PCR     OTOLARYNGOLOGY REFERRAL   3. Pulmonary embolism without acute cor pulmonale, unspecified chronicity, unspecified pulmonary embolism type (H)  I26.99    4. Polycythemia  D75.1        1. He had come in today for his annual wellness exam/physical but had positive symptoms on the COVID questionnaire. Vitals are normal besides and elevated blood pressure but he states it was normal earlier today at home. COVID testing completed today in clinic and I recommend patient stay quarantined until results are back. This could very well be a common cold so I do not believe any antibiotics are warranted, even with his COPD. He will treat symptomatically and will continue to monitor for any changes. Will reschedule annual physical.    2. New lesion just inside the right cheek. He is unsure how long this has been there but thinks it has been quite awhile. Given this new finding with his smoking history, will refer to ENT for further evaluation and possible biopsy.    3-4. He had a PE last year but was only on anticoagulation for 3 months and then he stopped. It was recommended by hematology that he be on it for at least 6 months if not indefinitely. He also has polycythemia and was undergoing phlebotomies but he stopped when COVID started. I recommend he see hematology again for further evaluation.        LISA Osuna [facility]

## 2021-02-18 NOTE — PROGRESS NOTES
Chief Complaint   Patient presents with     RECHECK     Diabetic foot care. Calluses, bilateral foot.             Allergies   Allergen Reactions     Seasonal Allergies          Subjective: Clarke is a 69 year old male who presents to the clinic today for a diabetic foot exam and management. He was last seen in person by me a year ago. He relates that he is having some pain in the hammertoes.  He relates that he has quite significant calluses on both feet.  His nails are causing him pain.  He does not have a pair of diabetic shoes, as they would not fit with his previous amount of edema.  He relates that this is gotten under better control over the last few months.  He does not currently have home nursing care.    Objective  Hemoglobin A1C   Date Value Ref Range Status   11/09/2020 5.9 (H) 4.1 - 5.7 % Final   07/09/2020 5.9 (H) 0 - 5.6 % Final     Comment:     Normal <5.7% Prediabetes 5.7-6.4%  Diabetes 6.5% or higher - adopted from ADA   consensus guidelines.     02/26/2019 6.1 (H) 0 - 5.6 % Final     Comment:     Normal <5.7% Prediabetes 5.7-6.4%  Diabetes 6.5% or higher - adopted from ADA   consensus guidelines.         DP and PT pulses 1/4 BL. CRT 1 second. Absent pedal hair.  Gross and protective sensations are diminished BL.  Hammertoes to all lesser digits. Hallux malleus BL. Equinus BL. Pes planus.   Onychomycosis to toes x10.  He has hyperkeratotic lesions noted to the distal aspect of bilateral halluces and to bilateral dorsal fifth digits.  These were sharply debrided and the wound was noted underneath the left hallux as detailed below:      A diabetic wound is noted at left  dorsal hallux IPJ measuring 1cm x 1cm x 0.1cm.    Sosa Classification: 2    Wound base: Red/Granulation    Edges: tyloma    Drainage: small/serosanguinous    Odor: no    Undermining: no    Bone Exposure: No    Clinical Signs of Infection: No    After obtaining patient consent, the wound was irrigated with copious amounts of saline.  A scalpel was then used to debride the wound into subcutaneous tissue. The wound edges were debrided back to healthy, bleeding tissue. The wound base exhibited healthy bleeding. Given the patient's lack of sensation, no anesthesia was necessary for the procedure.    Barriers to Healing: DM2 with neuropathy. Toes are contracted.     Treatment Plan: Medihoney with Bordered gauze.     Pt Ability to Follow Plan: Good.          Assessment: Clarke is a 70 YO male with DM2 and neuropathy.  Ulceration on the left dorsal hallux.  This was noted to be underneath the tyloma.  I sharply debrided this today as described above.  I think that he will need some home nursing care 3 times a week to help him change the dressing on this.  He cannot get down to his foot.  Onychomycosis  Tyloma.      Plan:   - Pt seen and evaluated  - Tylomas debrided x 2.  - Nails debrided x 10.      -Wound debrided x1.  -Start Medihoney and bordered gauze on the left hallux.  Will order home nursing to help him with this.  - Pt to return to clinic in 4 weeks.

## 2021-02-19 NOTE — TELEPHONE ENCOUNTER
CAlled Clarke to let him know that I am still planning to call him at 12:30 on Monday.He committed to call for his ride to his Tuesday appt with the podiatrist.

## 2021-02-20 NOTE — TELEPHONE ENCOUNTER
Returned call to home care nurse to give verbal orders per protocol as requested. Nurse verbalized understanding.    Elif Hartman RN       This is a 69 y/o female with significant PMHx of former 5 pack year cigarette smoker, T2DM (HgA1c 9.2 this admission) managed by endocrinologist in [place], denies complications), hypothyroidism, HTN, HLD admitted to [facility] on 2/8/2021 with NSTEMI and transferred to [facility] 2/11/21 for C with Dr. Duggan.  LHC revealed 80-90% stenosis to LAD, 80-90% stenosis to prox diag, 80-90% to prox Cx, 70-80 to mid/distal RCA. CTS consult called to evaluate patient for cardiac surgery.  She is now POD #2 from CABGx3.    2/12 Pre op Cardiac Surgery in progress. D/C Brilinta.  P2Y12 94   2/13 VSS; rsr 60-70; stable at this time echo neg; carotids neg; repeat P2y12 in am; endo consulted for uncontrolled dm2- A1C 9.2; pt placed on lantus and admelog   continue asa/ statin/ b-blockers; hep gttp; ARB d/c preop   2/14 VSS - no c/o cp this am - maintain hep gtt & bb-will inc. to 50mg po bid-or tue  2/15 VSS- no chest pain today - continue on hep gtt  2/16 VSS- No chest pain- continue on hep gtt. Preop workup completed. Plan for CABG with Dr. Adhikari tomorrow.   2/17 s /p CABG, with JUAN CARLOS , LIMA-LAD, SVG-RCA, SVG-Circ   Post op hypotension, ivf, pressors  Extubated d 0  Hyperglycemia  insulin infusion  MS/ L pl remain in place  Transferred to sdu  2/19 Med and Lt pleural CT removed, CXR stable.  F/U TOV.  Beta blocker inc'd 25 BID to TID for . FS in 300's, insulin gtt restarted.    2/20 VVS; Continue with current medication regimen.  Insulin gtt d/c 2/2 increase agitation and confusion overnight  Patient continues hyperglycemic --> Lantus increased to 36 units and Admelog increased to 15 units SQ TID pre meal. K+ supplement.  Continue on a 1:1 for safety precautions. Lopressor increased to 50 mg PO TID.   Dispo: Home candidate.

## 2021-02-22 ENCOUNTER — VIRTUAL VISIT (OUTPATIENT)
Dept: PSYCHOLOGY | Facility: CLINIC | Age: 71
End: 2021-02-22
Payer: COMMERCIAL

## 2021-02-22 DIAGNOSIS — F41.1 GAD (GENERALIZED ANXIETY DISORDER): Primary | ICD-10-CM

## 2021-02-22 DIAGNOSIS — F33.1 MODERATE EPISODE OF RECURRENT MAJOR DEPRESSIVE DISORDER (H): ICD-10-CM

## 2021-02-22 PROCEDURE — 90832 PSYTX W PT 30 MINUTES: CPT | Mod: 95 | Performed by: PSYCHOLOGIST

## 2021-02-22 NOTE — PROGRESS NOTES
"Behavioral Health Progress Note    Client Legal Name: Clarke Moreira   Client Preferred Name: Clarke   Service Type: Individual  Length of Visit:  12:30 pm - 1:02 pm (32 minutes)  Attendees: patient     The following statement was read to the patient at the outset of this visit:     \"We have found that certain health care needs can be provided without the need for a face to face visit.  This service lets us provide the care you need with a phone conversation. I will have full access to your [facility] medical record during this entire phone call.   I will be taking notes for your medical record.  Since this is like an office visit, we will bill your insurance company for this service. There are potential benefits and risks of telephone visits (e.g. limits to patient confidentiality) that differ from in-person visits.?  Confidentiality still applies for telephone services, and nobody will record the visit.  It is important to be in a quiet, private space that is free of distractions (including cell phone or other devices) during the visit.??If during the course of the call I believe a telephone visit is not appropriate, you will not be charged for this service.\"     Consent has been obtained for this service by care team member: Yes     Emergency contact: Janice Dubon Emergency Room: [place]  Location at time of call: home      Identifying Information and Presenting Problem:    The patient is a 70 year old American male who I have seen off and on for therapy over the past couple of years.  Clarke did have difficulties at times coming into clinic for appointments as the anxiety would often lead him to cancel appointments.  Since the pandemic started and we have been doing phone visits, Clarke reports feeling much more comfortable in all of his appointments when they are done via telephone than when he is in person.  In person appointments with all providers have always caused significant " "distress due to his desire to be perceived in a particular way.  On the telephone, he feels as if he can better articulate his experience and what he is thinking as he feels less need to project a particular image to those that are caring for him.  Additionally, he has legitimate concerns about his chronic illnesses and increased vulnerability to COVID, so he prefers to do as many appointments from home as possible    Treatment Objective(s) Addressed in This Session:               Clarke will practice a healthy daily discipline of diet and exercise.    Clarke will learn to manage anxiety so he can make and keep appointments   Progress on / Status of Treatment Objective(s) / Homework  Stable, status about the same    PHQ-9 SCORE 9/3/2019 1/6/2020 11/9/2020   PHQ-9 Total Score - - -   PHQ-9 Total Score 25 21 22       CHRIS-7 SCORE 12/11/2018 5/10/2019 9/3/2019   Total Score - - -   Total Score 20 13 19     Topics Discussed/Interventions Provided:  \"I feel like I'm drowning in hopelessness\"  Relates to everything, but particularly to all the societal things that are happening.  One event helped with some perspective on this - realized that we have been doing this all of our existence. Realized that things change, but at a glacial pace.  Facilitated conversation about being able to identify what pieces we have control over and what pieces we can not control.    Anxiety about the appointment tomorrow.  Did reserve transportation. Didn't cancel appointment.  Provided positive reinforcement for the care he was showing himself.  States he doesn't want to go and hear bad news, but is in so much pain is willing to go.  Also is looking forward to getting to wear his black lives matter mask in public. Scared of losing his feet.  Discussed barriers to attending appts.  Feels very subordinate in doctor's offices, doesn't feel as if he advocates well for himself.  Never asks the questions he wants to ask.  Discussed writing out a " list of points he wants to make or questions he has that he can bring along as a guide when his mind goes blank.      Conversation with social work about available services. Would have to do a spend down.  Worries about the financial impact of this even though he realizes there are services he would be getting as well.  Feels like a fraud - like people are seeing him as someone who is trying to defraud the system. Challenged and worked with Clarke to reframe these thoughts.      Helpful to think about the team he has here that will keep working with him whatever the news he gets at appt tomorrow.      Assessment: The patient was engaged in the conversation today via phone.      Mental Status: Clarke sounded alert and oriented.  Speech was of normal rate and rhythm. Mood was described as hopeless.  Affect was down, but appropriate to stated mood and presentation.  Thought processes were logical and coherent.  Thought content continues to include disparaging and shaming thoughts toward himself, but no evidence of psychotic or paranoid features.  Reports ongoing passive suicidal thoughts, but denies any plan or any intent to do anything to harm himself.  Memory appeared grossly intact. Insight and judgment appeared good and patient exhibited good impulse control during the appointment.     Does the patient appear to be at imminent risk of harm to self/others at this time? No    The session was necessary to identify barriers, such as unhelpful cognitions that lead to him often cancelling appts at the last minute.  Discussed what his motivations were for upcoming appt, as well as potential barriers.  Symptoms of depression and anxiety have continued to interfere with his ability to function at home and cause distress that can interfere with his ability to care for himself.  Ongoing psychotherapy is necessary to provide counseling and provide support.    Diagnosis (DSM-5):  Major Depression, recurrent,  moderate  Generalized Anxiety Disorder  R/o persistent depressive disorder    Plan:  1. Appt with clarke in 2-3 weeks  2. Will check if he makes podiatry appt.  3. Needs appt with cardiology, but will focus on podiatry for now.  4. Spoke to dr. Sanchez about current situation. Suggested that Clarke could do telehealth visit even if it was for skin, just to keep checking in on medical needs.    5. Did not check in about a [employer] volunteer today due to the many other services we were discussing  6. Need to update diagnostic      NOTE: Treatment plan update needed on 3/10/21.  Diagnostic assessment update due 10/15/19.

## 2021-02-23 ENCOUNTER — OFFICE VISIT (OUTPATIENT)
Dept: ORTHOPEDICS | Facility: CLINIC | Age: 71
End: 2021-02-23
Payer: COMMERCIAL

## 2021-02-23 DIAGNOSIS — E11.49 TYPE II OR UNSPECIFIED TYPE DIABETES MELLITUS WITH NEUROLOGICAL MANIFESTATIONS, NOT STATED AS UNCONTROLLED(250.60) (H): Primary | ICD-10-CM

## 2021-02-23 DIAGNOSIS — B35.1 OM (ONYCHOMYCOSIS): ICD-10-CM

## 2021-02-23 DIAGNOSIS — E11.621 DIABETIC ULCER OF TOE OF LEFT FOOT ASSOCIATED WITH TYPE 2 DIABETES MELLITUS, LIMITED TO BREAKDOWN OF SKIN (H): ICD-10-CM

## 2021-02-23 DIAGNOSIS — M21.41 PES PLANUS OF BOTH FEET: ICD-10-CM

## 2021-02-23 DIAGNOSIS — L97.521 DIABETIC ULCER OF TOE OF LEFT FOOT ASSOCIATED WITH TYPE 2 DIABETES MELLITUS, LIMITED TO BREAKDOWN OF SKIN (H): ICD-10-CM

## 2021-02-23 DIAGNOSIS — M20.41 HAMMER TOES OF BOTH FEET: ICD-10-CM

## 2021-02-23 DIAGNOSIS — M21.42 PES PLANUS OF BOTH FEET: ICD-10-CM

## 2021-02-23 DIAGNOSIS — L84 TYLOMA: ICD-10-CM

## 2021-02-23 DIAGNOSIS — M20.42 HAMMER TOES OF BOTH FEET: ICD-10-CM

## 2021-02-23 PROCEDURE — 11042 DBRDMT SUBQ TIS 1ST 20SQCM/<: CPT | Performed by: PODIATRIST

## 2021-02-23 PROCEDURE — 99213 OFFICE O/P EST LOW 20 MIN: CPT | Mod: 25 | Performed by: PODIATRIST

## 2021-02-23 NOTE — LETTER
2/23/2021         RE: Clarke Moreira  [address]  [address]  [facility] [address]        Dear Colleague,    Thank you for referring your patient, Clarke Moreira, to the [facility] [place]. Please see a copy of my visit note below.    Chief Complaint   Patient presents with     RECHECK     Diabetic foot care. Calluses, bilateral foot.             Allergies   Allergen Reactions     Seasonal Allergies          Subjective: Clarke is a 69 year old male who presents to the clinic today for a diabetic foot exam and management. He was last seen in person by me a year ago. He relates that he is having some pain in the hammertoes.  He relates that he has quite significant calluses on both feet.  His nails are causing him pain.  He does not have a pair of diabetic shoes, as they would not fit with his previous amount of edema.  He relates that this is gotten under better control over the last few months.  He does not currently have home nursing care.    Objective  Hemoglobin A1C   Date Value Ref Range Status   11/09/2020 5.9 (H) 4.1 - 5.7 % Final   07/09/2020 5.9 (H) 0 - 5.6 % Final     Comment:     Normal <5.7% Prediabetes 5.7-6.4%  Diabetes 6.5% or higher - adopted from ADA   consensus guidelines.     02/26/2019 6.1 (H) 0 - 5.6 % Final     Comment:     Normal <5.7% Prediabetes 5.7-6.4%  Diabetes 6.5% or higher - adopted from ADA   consensus guidelines.         DP and PT pulses 1/4 BL. CRT 1 second. Absent pedal hair.  Gross and protective sensations are diminished BL.  Hammertoes to all lesser digits. Hallux malleus BL. Equinus BL. Pes planus.   Onychomycosis to toes x10.  He has hyperkeratotic lesions noted to the distal aspect of bilateral halluces and to bilateral dorsal fifth digits.  These were sharply debrided and the wound was noted underneath the left hallux as detailed below:      A diabetic wound is noted at left  dorsal hallux IPJ measuring 1cm x 1cm x  0.1cm.    Sosa Classification: 2    Wound base: Red/Granulation    Edges: tyloma    Drainage: small/serosanguinous    Odor: no    Undermining: no    Bone Exposure: No    Clinical Signs of Infection: No    After obtaining patient consent, the wound was irrigated with copious amounts of saline. A scalpel was then used to debride the wound into subcutaneous tissue. The wound edges were debrided back to healthy, bleeding tissue. The wound base exhibited healthy bleeding. Given the patient's lack of sensation, no anesthesia was necessary for the procedure.    Barriers to Healing: DM2 with neuropathy. Toes are contracted.     Treatment Plan: Medihoney with Bordered gauze.     Pt Ability to Follow Plan: Good.          Assessment: Clarke is a 70 YO male with DM2 and neuropathy.  Ulceration on the left dorsal hallux.  This was noted to be underneath the tyloma.  I sharply debrided this today as described above.  I think that he will need some home nursing care 3 times a week to help him change the dressing on this.  He cannot get down to his foot.  Onychomycosis  Tyloma.      Plan:   - Pt seen and evaluated  - Tylomas debrided x 2.  - Nails debrided x 10.      -Wound debrided x1.  -Start Medihoney and bordered gauze on the left hallux.  Will order home nursing to help him with this.  - Pt to return to clinic in 4 weeks.     Jesús Lagos DPM

## 2021-02-23 NOTE — NURSING NOTE
Reason For Visit:   Chief Complaint   Patient presents with     RECHECK     Diabetic foot care. Calluses, bilateral foot.        Pain Assessment  Patient Currently in Pain: Yes  Primary Pain Location: Foot(Bilateral)  Pain Descriptors: (Pain and neuropathy)        Allergies   Allergen Reactions     Seasonal Allergies            Whitley Munoz LPN

## 2021-02-26 ENCOUNTER — DOCUMENTATION ONLY (OUTPATIENT)
Dept: CARE COORDINATION | Facility: CLINIC | Age: 71
End: 2021-02-26

## 2021-02-26 NOTE — PROGRESS NOTES
[facility] now requests orders and shares plan of care/discharge summaries for some patients through [employer].  Please REPLY TO THIS MESSAGE OR ROUTE BACK TO THE AUTHOR in order to give authorization for orders when needed.  This is considered a verbal order, you will still receive a faxed copy of orders for signature.  Thank you for your assistance in improving collaboration for our patients.    ORDER    Skilled nursing 1 week 1, 3 week 8, 3 PRN for wound cares and assessment, medication teaching and assess effects, diabetic teaching. Check INR on 3/1/2021.   Wound care instructions per Dr. Lagos.   OT to evaluate and treat to include lymphedema therapy, compression.     Arielle Moreno, JUANPABLO   [facility]  [phone]

## 2021-03-01 ENCOUNTER — DOCUMENTATION ONLY (OUTPATIENT)
Dept: CARE COORDINATION | Facility: CLINIC | Age: 71
End: 2021-03-01

## 2021-03-04 ENCOUNTER — TELEPHONE (OUTPATIENT)
Dept: FAMILY MEDICINE | Facility: CLINIC | Age: 71
End: 2021-03-04

## 2021-03-04 NOTE — TELEPHONE ENCOUNTER
[facility] now requests orders and shares plan of care/discharge summaries for some patients through [employer].  Please REPLY TO THIS MESSAGE OR ROUTE BACK TO THE AUTHOR in order to give authorization for orders when needed.  This is considered a verbal order, you will still receive a faxed copy of orders for signature.  Thank you for your assistance in improving collaboration for our patients.    ORDER: OT lymphedema therapy 4x/month for 1 month for lymph HEP, garment education/ordering, skin/edema assessment and pt education.     Please respond to this message as soon as possible. Thank you  Romana Nicholson OTR/L CLT

## 2021-03-05 ENCOUNTER — DOCUMENTATION ONLY (OUTPATIENT)
Dept: FAMILY MEDICINE | Facility: CLINIC | Age: 71
End: 2021-03-05

## 2021-03-05 NOTE — PROGRESS NOTES
"When opening a documentation only encounter, be sure to enter in \"Chief Complaint\" Forms and in \" Comments\" Title of form, description if needed.    Clarke is a 70 year old  male  Form received via: Fax [phone]  Form now resides in: Provider Nabil Longoria CMA                  "

## 2021-03-06 ENCOUNTER — MEDICAL CORRESPONDENCE (OUTPATIENT)
Dept: HEALTH INFORMATION MANAGEMENT | Facility: CLINIC | Age: 71
End: 2021-03-06

## 2021-03-10 ENCOUNTER — DOCUMENTATION ONLY (OUTPATIENT)
Dept: FAMILY MEDICINE | Facility: CLINIC | Age: 71
End: 2021-03-10

## 2021-03-10 NOTE — PROGRESS NOTES
Form has been completed by provider.     Form sent out via: Fax to [place] at Fax Number: [phone]  Patient informed: na  Output date: March 10, 2021    Bisi Sahu CMA      **Please close the encounter**    Form has been completed by provider.     Form sent out via: Fax to [phone]  Patient informed: na  Output date: March 18, 2021    Yajaira Longoria CMA      **Please close the encounter**

## 2021-03-10 NOTE — PROGRESS NOTES
"When opening a documentation only encounter, be sure to enter in \"Chief Complaint\" Forms and in \" Comments\" Title of form, description if needed.    Clarke is a 70 year old  male  Form received via: Fax  Form now resides in: Provider Ready    Bisi Sahu CMA    Form has been completed by provider.     Form sent out via: [phone]  Patient informed: na  Output date: March 18, 2021    Yajaira Longoria CMA      **Please close the encounter**                    "

## 2021-03-10 NOTE — PROGRESS NOTES
"When opening a documentation only encounter, be sure to enter in \"Chief Complaint\" Forms and in \" Comments\" Title of form, description if needed.    Clarke is a 70 year old  male  Form received via: Fax  Form now resides in: Provider Ready    Bisi Sahu CMA    Form has been completed by provider.     Form sent out via: [phone]  Patient informed: na  Output date: March 18, 2021    Yajaira Longoria CMA      **Please close the encounter**                    "

## 2021-03-11 ENCOUNTER — TELEPHONE (OUTPATIENT)
Dept: FAMILY MEDICINE | Facility: CLINIC | Age: 71
End: 2021-03-11

## 2021-03-11 NOTE — TELEPHONE ENCOUNTER
[facility] now requests orders and shares plan of care/discharge summaries for some patients through [employer].  Please REPLY TO THIS MESSAGE OR ROUTE BACK TO THE AUTHOR in order to give authorization for orders when needed.  This is considered a verbal order, you will still receive a faxed copy of orders for signature.  Thank you for your assistance in improving collaboration for our patients.    ORDER: SW Evaluation 1 visit /every 30 days/ x1 for assessment on community resources and addition assist available to patient.     Please respond to this message as soon as possible.  Romana Nicholson OTR/L CLT

## 2021-03-12 ENCOUNTER — TELEPHONE (OUTPATIENT)
Dept: FAMILY MEDICINE | Facility: CLINIC | Age: 71
End: 2021-03-12

## 2021-03-12 DIAGNOSIS — E11.9 TYPE 2 DIABETES MELLITUS WITHOUT COMPLICATION, WITHOUT LONG-TERM CURRENT USE OF INSULIN (H): ICD-10-CM

## 2021-03-12 NOTE — TELEPHONE ENCOUNTER
Verify that the refill encounter hasn't been started Yes    [facility] phone call message- patient requesting a refill:    Full Medication Name: blood glucose (ONETOUCH ULTRA) test strip    Dose: Use to test blood sugars 2 times daily or as directed.     Pharmacy confirmed as   [facility] - [place], [place] - [address]  [address]  [facility] [address]  Phone: [phone] Fax: [phone]  : Yes    Medication tab checked to see if medication has been sent  Yes    Additional Comments: Patient is out.      OK to leave a message on voice mail? Yes    Advised patient refill may take up to 2 business days? Yes    Primary language: English      needed? No    Call taken on March 12, 2021 at 12:46 PM by Susan Manzo to [facility] REFILL

## 2021-03-12 NOTE — TELEPHONE ENCOUNTER
Patient requesting refill of blood glucose test strips. Last office visit 11/9/20 with Dr. Sanchez. Last A1C 5.9. RN filling per diabetic protocol.   Elena Lora, RN

## 2021-03-16 ENCOUNTER — DOCUMENTATION ONLY (OUTPATIENT)
Dept: FAMILY MEDICINE | Facility: CLINIC | Age: 71
End: 2021-03-16

## 2021-03-16 ENCOUNTER — MEDICAL CORRESPONDENCE (OUTPATIENT)
Dept: HEALTH INFORMATION MANAGEMENT | Facility: CLINIC | Age: 71
End: 2021-03-16

## 2021-03-16 DIAGNOSIS — Z53.9 DIAGNOSIS NOT YET DEFINED: Primary | ICD-10-CM

## 2021-03-16 PROCEDURE — G0180 MD CERTIFICATION HHA PATIENT: HCPCS | Performed by: FAMILY MEDICINE

## 2021-03-16 NOTE — PROGRESS NOTES
"When opening a documentation only encounter, be sure to enter in \"Chief Complaint\" Forms and in \" Comments\" Title of form, description if needed.    Clarke is a 70 year old  male  Form received via: Fax [phone]  Form now resides in: Provider Ready    Yajaira Longoria CMA     Form has been completed by provider.     Form sent out via: [phone]  Patient informed: na  Output date: March 18, 2021    Yajaira Longoria CMA      **Please close the encounter**                      "

## 2021-03-18 ENCOUNTER — VIRTUAL VISIT (OUTPATIENT)
Dept: PSYCHOLOGY | Facility: CLINIC | Age: 71
End: 2021-03-18
Payer: COMMERCIAL

## 2021-03-18 DIAGNOSIS — F41.1 GAD (GENERALIZED ANXIETY DISORDER): ICD-10-CM

## 2021-03-18 DIAGNOSIS — F33.1 MODERATE EPISODE OF RECURRENT MAJOR DEPRESSIVE DISORDER (H): Primary | ICD-10-CM

## 2021-03-18 PROCEDURE — 90834 PSYTX W PT 45 MINUTES: CPT | Mod: 95 | Performed by: PSYCHOLOGIST

## 2021-03-18 NOTE — PROGRESS NOTES
"Behavioral Health Progress Note    Client Legal Name: Clarke Moreira   Client Preferred Name: Clarke   Service Type: Individual  Length of Visit:  8:20 - 9:02 (42 minutes)  Attendees: patient     The following statement was read to the patient at the outset of this visit:     \"We have found that certain health care needs can be provided without the need for a face to face visit.  This service lets us provide the care you need with a phone conversation. I will have full access to your [facility] medical record during this entire phone call.   I will be taking notes for your medical record.  Since this is like an office visit, we will bill your insurance company for this service. There are potential benefits and risks of telephone visits (e.g. limits to patient confidentiality) that differ from in-person visits.?  Confidentiality still applies for telephone services, and nobody will record the visit.  It is important to be in a quiet, private space that is free of distractions (including cell phone or other devices) during the visit.??If during the course of the call I believe a telephone visit is not appropriate, you will not be charged for this service.\"     Consent has been obtained for this service by care team member: Yes     Emergency contact: Janice Kady [phone]  Closest Emergency Room: [place]  Location at time of call: home      Identifying Information and Presenting Problem:    The patient is a 70 year old American male who I have seen off and on for therapy over the past couple of years.  Clarke did have difficulties at times coming into clinic for appointments as the anxiety would often lead him to cancel appointments.  Since the pandemic started and we have been doing phone visits, Clarke reports feeling much more comfortable in all of his appointments when they are done via telephone than when he is in person.  In person appointments with all providers have always caused significant " "distress due to his desire to be perceived in a particular way.  On the telephone, he feels as if he can better articulate his experience and what he is thinking as he feels less need to project a particular image to those that are caring for him.  Additionally, he has legitimate concerns about his chronic illnesses and increased vulnerability to COVID, so he prefers to do as many appointments from home as possible    Treatment Objective(s) Addressed in This Session:               Clarke will practice a healthy daily discipline of diet and exercise.    Clarke will learn to manage anxiety so he can make and keep appointments   Progress on / Status of Treatment Objective(s) / Homework  Mood is down, but stable.  Attended the podiatry appt!    PHQ-9 SCORE 9/3/2019 1/6/2020 11/9/2020   PHQ-9 Total Score - - -   PHQ-9 Total Score 25 21 22       CHRIS-7 SCORE 12/11/2018 5/10/2019 9/3/2019   Total Score - - -   Total Score 20 13 19     Topics Discussed/Interventions Provided:  Clarke has home care nurses coming into his apartment to help with wound care, as well as lymphedema care.  Part of him is delighted to have people there again that are caring for him and talking to him.  At the same time, people coming into his home to care for him wakes up all kinds of unwanted feelings and thoughts about how he perceives himself interacting with them (am I too needy, I'm being too chatty, they aren't friends, they are here because they have to be, etc).  Starts to get caught in those thoughts and then mood starts to go down - something pleasant becomes something unpleasant.  States it \"deepens the sadness in me.\"  Identifies as protection against these feelings he can get very judgemental about how they are providing care, rather than if the work they are doing is the care he needs.  Has to take time after people leave to try to ground himself again.  Feels he is able to recognize this cycle more this time, but also has some " helpless feelings around this. Discussed just noticing and naming and feelings, rather than trying to control them or push them away to see if this decreases intensity. Trying to grapple with the concept that people are caring for him just because he is himself, not because he is a certain person he thinks they want him to be.     Felt very anxious about going to podiatry appt - the appt itself, but also seeing how many people are out and about.  Asked for paperwork to be completed so he could get shoes and this need was responded to. Provided positive reinforcement for asking for something he was needing, rather than getting lost in thoughts about how this was not responded to previously and then withdrawaling.      Assessment: The patient was engaged in the conversation today via phone.      Mental Status: Clarke sounded alert and oriented.  Speech was of normal rate and rhythm. Mood was described as about the same.  Affect was down, but appropriate to stated mood and presentation.  Thought processes were logical and coherent.  Thought content continues to include disparaging and shaming thoughts toward himself - some recognition of when this is happening.  No evidence of psychotic or paranoid features.  Reports ongoing passive suicidal thoughts, but denies any plan or any intent to do anything to harm himself.  Memory appeared grossly intact. Insight and judgment appeared good and patient exhibited good impulse control during the appointment.     Does the patient appear to be at imminent risk of harm to self/others at this time? 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 Symptoms of depression and anxiety have continued to interfere with his ability to function at home and cause distress that can interfere with his ability to care for himself.  Ongoing psychotherapy is necessary to provide counseling and provide support.    Diagnosis (DSM-5):  Major Depression, recurrent, moderate  Generalized Anxiety Disorder  R/o persistent depressive disorder    Plan:  1. Appt with clarke on 4/15  2. Next appt needed is cardiology   3. Spoke to dr. Sanchez about current situation. Suggested that Clarke could do telehealth visit even if it was for skin, just to keep checking in on medical needs.    4. Did not check in about a [employer] volunteer today due to the many other services we were discussing  5. Need to update diagnostic      NOTE: Treatment plan update needed on 3/10/21.  Diagnostic assessment update due 10/15/19.

## 2021-03-23 ENCOUNTER — TELEPHONE (OUTPATIENT)
Dept: ORTHOPEDICS | Facility: CLINIC | Age: 71
End: 2021-03-23

## 2021-03-23 ENCOUNTER — ANCILLARY PROCEDURE (OUTPATIENT)
Dept: GENERAL RADIOLOGY | Facility: CLINIC | Age: 71
End: 2021-03-23
Attending: PODIATRIST
Payer: COMMERCIAL

## 2021-03-23 ENCOUNTER — OFFICE VISIT (OUTPATIENT)
Dept: ORTHOPEDICS | Facility: CLINIC | Age: 71
End: 2021-03-23
Payer: COMMERCIAL

## 2021-03-23 DIAGNOSIS — E11.621 DIABETIC ULCER OF TOE OF LEFT FOOT ASSOCIATED WITH TYPE 2 DIABETES MELLITUS, WITH NECROSIS OF BONE (H): ICD-10-CM

## 2021-03-23 DIAGNOSIS — E11.621 DIABETIC ULCER OF TOE OF LEFT FOOT ASSOCIATED WITH TYPE 2 DIABETES MELLITUS, WITH NECROSIS OF BONE (H): Primary | ICD-10-CM

## 2021-03-23 DIAGNOSIS — L97.524 DIABETIC ULCER OF TOE OF LEFT FOOT ASSOCIATED WITH TYPE 2 DIABETES MELLITUS, WITH NECROSIS OF BONE (H): ICD-10-CM

## 2021-03-23 DIAGNOSIS — E11.49 TYPE II OR UNSPECIFIED TYPE DIABETES MELLITUS WITH NEUROLOGICAL MANIFESTATIONS, NOT STATED AS UNCONTROLLED(250.60) (H): ICD-10-CM

## 2021-03-23 DIAGNOSIS — L97.524 DIABETIC ULCER OF TOE OF LEFT FOOT ASSOCIATED WITH TYPE 2 DIABETES MELLITUS, WITH NECROSIS OF BONE (H): Primary | ICD-10-CM

## 2021-03-23 LAB
GRAM STN SPEC: ABNORMAL
Lab: ABNORMAL
SPECIMEN SOURCE: ABNORMAL

## 2021-03-23 PROCEDURE — 73660 X-RAY EXAM OF TOE(S): CPT | Mod: LT | Performed by: RADIOLOGY

## 2021-03-23 PROCEDURE — 99000 SPECIMEN HANDLING OFFICE-LAB: CPT | Performed by: PATHOLOGY

## 2021-03-23 PROCEDURE — 87205 SMEAR GRAM STAIN: CPT | Mod: 90 | Performed by: PATHOLOGY

## 2021-03-23 PROCEDURE — 87075 CULTR BACTERIA EXCEPT BLOOD: CPT | Mod: 90 | Performed by: PATHOLOGY

## 2021-03-23 PROCEDURE — 99213 OFFICE O/P EST LOW 20 MIN: CPT | Performed by: PODIATRIST

## 2021-03-23 PROCEDURE — 87070 CULTURE OTHR SPECIMN AEROBIC: CPT | Mod: 90 | Performed by: PATHOLOGY

## 2021-03-23 NOTE — NURSING NOTE
Reason For Visit:   Chief Complaint   Patient presents with     Wound Check     Wounds, bilateral hallux. 1 month follow up.        Pain Assessment  Patient Currently in Pain: Yes  Primary Pain Location: (Left hallux)        Allergies   Allergen Reactions     Seasonal Allergies            Whitley Munoz LPN

## 2021-03-23 NOTE — PROGRESS NOTES
Chief Complaint   Patient presents with     Wound Check     Wounds, bilateral hallux. 1 month follow up.             Allergies   Allergen Reactions     Seasonal Allergies          Subjective: Clarke is a 69 year old male who presents to the clinic today for a diabetic foot ulcer. He was seen 4 weeks ago. Relates that he does have home nursing. Pain is noted to the foot.     Objective  Hemoglobin A1C   Date Value Ref Range Status   11/09/2020 5.9 (H) 4.1 - 5.7 % Final   07/09/2020 5.9 (H) 0 - 5.6 % Final     Comment:     Normal <5.7% Prediabetes 5.7-6.4%  Diabetes 6.5% or higher - adopted from ADA   consensus guidelines.     02/26/2019 6.1 (H) 0 - 5.6 % Final     Comment:     Normal <5.7% Prediabetes 5.7-6.4%  Diabetes 6.5% or higher - adopted from ADA   consensus guidelines.         DP and PT pulses 1/4 BL. CRT 1 second. Absent pedal hair.  Gross and protective sensations are diminished BL.  Hammertoes to all lesser digits. Hallux malleus BL. Equinus BL. Pes planus.   Onychomycosis to toes x10.            A diabetic wound is noted at left  dorsal hallux IPJ measuring 1cm x 1cm x 0.3cm.    Sosa Classification: 3    Wound base: Red/Granulation    Edges: tyloma    Drainage: small/serosanguinous    Odor: no    Undermining: no    Bone Exposure: Yes, likely head of the proximal phalanx    Clinical Signs of Infection: Yes +PTB    After obtaining patient consent, the wound was irrigated with copious amounts of saline.     Barriers to Healing: DM2 with neuropathy. Toes are contracted. Has osteomyelitis in the toe. Has previus hx of poor wound healing.     Treatment Plan: Iodosorb and DSD.     Pt Ability to Follow Plan: Good.       left toe xrays indicated in 3 weightbearing views.    Osteolysis noted to the medial proximal phalanx head, consistent with osteomyelitis.        Assessment: Clarke is a 70 YO male with DM2 and neuropathy.  Ulceration on the left dorsal hallux.  This now probes to bone. XR changes indicate  osteomyelitis. I sharply debrided this today as described above.  I think that he will need some home nursing care 3 times a week to help him change the dressing on this.  He cannot get down to his foot.  Onychomycosis  Tyloma.      Plan:   - Pt seen and evaluated  - XRs taken and discussed with him.  - I discussed with him that he is at increased risk for amputation to the digit.   - Cx taken Start Augmentin. ID consult.   -Start Iodosorb and bordered gauze on the left hallux.  Will order home nursing to help him with this.  - Pt to return to clinic in 2 weeks.

## 2021-03-23 NOTE — TELEPHONE ENCOUNTER
Called and left voicemail for [facility]. Change orders from 2/2021 to Iodosorb, dry sterile dressing, change 3 times a week.     Requested a call back with any questions.

## 2021-03-24 ENCOUNTER — TELEPHONE (OUTPATIENT)
Dept: FAMILY MEDICINE | Facility: CLINIC | Age: 71
End: 2021-03-24

## 2021-03-24 ENCOUNTER — TELEPHONE (OUTPATIENT)
Dept: ORTHOPEDICS | Facility: CLINIC | Age: 71
End: 2021-03-24

## 2021-03-24 NOTE — TELEPHONE ENCOUNTER
[facility] phone call message - order or referral request from patient:     Order or referral being requested: Requested order     Additional Details: JUANPABLO Frederick calling to request to have the update wound order to be faxed to [facility] home care as soon as possible. RN stated it was updated in clinic last week. Please advise.     Referral only -Specialty N/A Location N/A    OK to leave a message on voice mail? Yes    Primary language: English      needed? No    Call taken on March 24, 2021 at 1:37 PM by Susan Saenz    Order request route to [facility] TRIAGE   Referrals Route to [facility] (Green/Bertie/Purple) CARE COORDINATOR

## 2021-03-24 NOTE — TELEPHONE ENCOUNTER
RN called home care nurse back and relayed pt sees ortho for wound care. Gave number [phone]    Elif Hartman RN

## 2021-03-24 NOTE — TELEPHONE ENCOUNTER
LVM giving verbal orders. Provided call back number and encouraged to call if they need further assistance.     Mirta Lovelace ATC

## 2021-03-25 LAB
BACTERIA SPEC CULT: NORMAL
Lab: NORMAL
SPECIMEN SOURCE: NORMAL

## 2021-03-26 DIAGNOSIS — I50.30 HEART FAILURE WITH PRESERVED EJECTION FRACTION, UNSPECIFIED HF CHRONICITY (H): ICD-10-CM

## 2021-03-26 RX ORDER — SPIRONOLACTONE 25 MG/1
50 TABLET ORAL DAILY
Qty: 180 TABLET | Refills: 1 | Status: SHIPPED | OUTPATIENT
Start: 2021-03-26 | End: 2021-09-28

## 2021-03-29 ENCOUNTER — DOCUMENTATION ONLY (OUTPATIENT)
Dept: FAMILY MEDICINE | Facility: CLINIC | Age: 71
End: 2021-03-29

## 2021-03-29 ENCOUNTER — VIRTUAL VISIT (OUTPATIENT)
Dept: INFECTIOUS DISEASES | Facility: CLINIC | Age: 71
End: 2021-03-29
Attending: STUDENT IN AN ORGANIZED HEALTH CARE EDUCATION/TRAINING PROGRAM
Payer: COMMERCIAL

## 2021-03-29 DIAGNOSIS — M86.9 OSTEOMYELITIS OF GREAT TOE OF LEFT FOOT (H): Primary | ICD-10-CM

## 2021-03-29 DIAGNOSIS — Z23 NEED FOR VACCINATION: ICD-10-CM

## 2021-03-29 DIAGNOSIS — E11.621 DIABETIC ULCER OF TOE OF LEFT FOOT ASSOCIATED WITH TYPE 2 DIABETES MELLITUS, WITH NECROSIS OF BONE (H): ICD-10-CM

## 2021-03-29 DIAGNOSIS — R79.89 ELEVATED SERUM CREATININE: ICD-10-CM

## 2021-03-29 DIAGNOSIS — L97.524 DIABETIC ULCER OF TOE OF LEFT FOOT ASSOCIATED WITH TYPE 2 DIABETES MELLITUS, WITH NECROSIS OF BONE (H): ICD-10-CM

## 2021-03-29 DIAGNOSIS — E03.9 HYPOTHYROIDISM, UNSPECIFIED TYPE: ICD-10-CM

## 2021-03-29 DIAGNOSIS — B35.1 ONYCHOMYCOSIS: ICD-10-CM

## 2021-03-29 DIAGNOSIS — E11.49 TYPE II OR UNSPECIFIED TYPE DIABETES MELLITUS WITH NEUROLOGICAL MANIFESTATIONS, NOT STATED AS UNCONTROLLED(250.60) (H): ICD-10-CM

## 2021-03-29 PROCEDURE — 99214 OFFICE O/P EST MOD 30 MIN: CPT | Mod: 95 | Performed by: STUDENT IN AN ORGANIZED HEALTH CARE EDUCATION/TRAINING PROGRAM

## 2021-03-29 RX ORDER — LEVOTHYROXINE SODIUM 175 UG/1
175 TABLET ORAL
Qty: 90 TABLET | Refills: 3 | Status: SHIPPED | OUTPATIENT
Start: 2021-03-29 | End: 2022-03-29

## 2021-03-29 NOTE — LETTER
3/29/2021       RE: Clarke Moreira  [address]  [address]  [place] [address]     Dear Colleague,    Thank you for referring your patient, Clarke Moreira, to the [facility] [place] [facility]. Please see a copy of my visit note below.    Patient has been on abx now for a few days and feels that it is helping.      Clarke is a 70 year old who is being evaluated via a billable telephone visit.      What phone number would you like to be contacted at? [phone]  How would you like to obtain your AVS? Mail a copy  Phone call duration: 25 minutes  Total time including chart review, care-coordination and documentation time on the date of encounter - 47 mins                [facility]  Infectious Disease Consultation note  Today's Date: 03/29/2021    Recommendations:  Recommend continuing augmentin for 4 weeks total with food.   Encouraged yogurt and OTC probiotic while on abx  Follow up with Dr. Lagos in 4 weeks. If wound is better, abx can be continued (for total of 6 or 8 weeks) or stopped based on degree of improvement of the wound. If the wound has not made progress, then recommend sending deeper cxs again and trying alternative abx of levaquin 750 mg daily and flagyl 500 mg po tid for 4 weeks and going from there.   Can consider treating onychomycosis of toe nails with systemic anti-fungals after infection resolves.   Recommend shingrix vaccination after covid.    I will be on leave in 4 weeks, but if Dr. Lagos would like assistance, patient can see a colleague of [facility]. Patient aware and agreeable    Thank you for involving Infectious Disease in the care of this patient.   Mahi Kirkpatrick  , [facility]  Pager - [phone]    Assessment:  Clarke Moreira is a 70 year old with PMH of DM (well controlled, last A1c 5.9),  HTN, CHF on coumadin, Right big toe  osteomyelitis Sep 2019 s/p 6 weeks regimen of levaquin and flagyl- which cleared it up, onychomycosis of toe nails    Left big toe osteomyelitis of distal phalanx: wound appeared in the past few weeks after likely poor hygiene and trauma. X ray with changes, wound cx with normal michael.   Started on augmentin 3-4 days ago. Recommend continuing the same. If in 4 weeks no improvement in wound, then recommend sending deep wound cxs again and can change abx  to levaquin 750 mg daily and flagyl 500 mg po tid.  If wound is better, abx can be continued (for total of 6 or 8 weeks) or stopped based on degree of improvement of the wound.     - Immunization status: not immunized against shingrix    Attestation: I have reviewed today's vital signs, medications, labs and imaging. I personally reviewed the imaging. Reviewed medical history, surgical history, and family history in Epic History tab. No updates needed to be made.     -------------------------------------------------------------------------------------------------------------------    Reason for consult / Chief complaint:   Consulted by Dr. Lagos for diabetic foot infection    History of presenting illness:  Clarke Moreira is a 70 year old with PMH of DM (well controlled, last A1c 5.9),  HTN, CHF on coumadin     He reports that he had Right big toe osteomyelitis Sep 2019 - he saw ID- he rcvd 6 weeks regimen of levaquin and flagyl- which cleared it up     Since Nov 2020- developed pressure pain with standing, walking in left big toe  He left a bandage there on for 3 weeks without changing. He admits to poor hygiene. 2 weeks ago when he removed it there was a wound there which looked like a little volcano  He Saw Dr. Lagos who did x ray and sent cxs after debriding. The x ray showed osteomyelitis of distal phalanx and cxs grew normal michael. He started him on augmentin 875 po bid x 2 weeks, 3-4 days ago.   Patient has side effect of Diarrhea with augmentin -  mild.       Social Hx:  Social History     Tobacco Use     Smoking status: Never Smoker     Smokeless tobacco: Never Used   Substance Use Topics     Alcohol use: No     Comment: Former alcohol abuse. Quit 70s then quit later in 80s-present     Drug use: No     Comment: history of LSD use         Immunizations:  Immunization History   Administered Date(s) Administered     Influenza (High Dose) 3 valent vaccine 10/30/2019     Influenza (IIV3) PF 12/15/2005, 03/08/2007, 01/25/2008, 10/01/2010, 12/02/2011, 09/06/2017     Influenza, Quad, High Dose, Pf, 65yr + 11/09/2020     Mantoux Tuberculin Skin Test 10/09/2017, 03/23/2019     Pneumo Conj 13-V (2010&after) 11/27/2017     Pneumococcal 23 valent 03/24/2019     TDAP Vaccine (Boostrix) 11/27/2017     Tdap (Adacel,Boostrix) 03/08/2007         Allergies:   Allergies   Allergen Reactions     Seasonal Allergies          Medications:  Current Outpatient Medications   Medication Sig Dispense Refill     ammonium lactate (LAC-HYDRIN) 12 % external cream Apply topically 2 times daily as needed for dry skin 385 g 11     amoxicillin-clavulanate (AUGMENTIN) 875-125 MG tablet Take 1 tablet by mouth 2 times daily With food 28 tablet 0     aspirin (ASA) 81 MG tablet Take 1 tablet (81 mg) by mouth daily 30 tablet 0     atorvastatin (LIPITOR) 40 MG tablet Take 1 tablet (40 mg) by mouth daily 30 tablet 11     metFORMIN (GLUCOPHAGE) 500 MG tablet Take 1 tablet (500 mg) by mouth 2 times daily (with meals) 60 tablet 11     metoprolol succinate ER (TOPROL-XL) 100 MG 24 hr tablet Take 1 tablet (100 mg) by mouth daily 30 tablet 11     multivitamin w/minerals (MULTI-VITAMIN) tablet Take 1 tablet by mouth daily 30 each 0     nystatin (MYCOSTATIN) 547108 UNIT/GM external cream Apply topically 2 times daily as needed for dry skin 30 g 11     omega 3 1000 MG CAPS Take 1 g by mouth daily 30 capsule 11     order for DME Equipment being ordered: Bariatric Lift chair  Diagnosis - morbid obesity, CHF,  Lymphedema    Fax to Ne from [employer] at [phone]. 1 Units 0     potassium chloride ER (KLOR-CON M) 20 MEQ CR tablet Take 2 tablets (40 mEq) by mouth 2 times daily 120 tablet 11     senna-docusate (SENOKOT-S/PERICOLACE) 8.6-50 MG tablet Take 1-2 tablets by mouth 2 times daily as needed for constipation 60 tablet 3     spironolactone (ALDACTONE) 25 MG tablet Take 2 tablets (50 mg) by mouth daily 180 tablet 1     tamsulosin (FLOMAX) 0.4 MG capsule Take 1 capsule (0.4 mg) by mouth daily 30 capsule 11     torsemide (DEMADEX) 100 MG tablet Take 1.5 tablets (150 mg) by mouth daily 30 tablet 11     vitamin B complex with vitamin C (VITAMIN  B COMPLEX) tablet Take 1 tablet by mouth daily 100 tablet 3     vitamin C (ASCORBIC ACID) 1000 MG TABS Take 1,000 mg by mouth daily       vitamin E (VITAMIN E COMPLEX) 1000 units (450 mg) capsule Take 1 capsule (1,000 Units) by mouth daily 100 capsule 3     warfarin ANTICOAGULANT (COUMADIN) 5 MG tablet Take 10 mg by mouth  5x/week, Take 5 mg by mouth 2x per week as directed 60 tablet 3     Ascorbic Acid (VITAMIN C) POWD Take 500 mcg by mouth daily  0     bacitracin 500 UNIT/GM external ointment Apply 30 g topically 2 times daily (Patient not taking: Reported on 11/9/2020) 30 g 1     blood glucose (ONETOUCH ULTRA) test strip Use to test blood sugars 2 times daily or as directed. (Patient not taking: Reported on 3/29/2021) 100 strip 11     blood glucose monitoring (ONE TOUCH DELICA) lancets Use to test blood sugars 2 times daily or as directed. (Patient not taking: Reported on 3/29/2021) 100 each 11     blood glucose monitoring (ONE TOUCH ULTRA MINI) meter device kit Use to test blood sugars 2 times daily or as directed. (Patient not taking: Reported on 3/29/2021) 1 kit 0     levothyroxine (SYNTHROID/LEVOTHROID) 175 MCG tablet Take 1 tablet (175 mcg) by mouth every morning (before breakfast) 90 tablet 3     order for DME Equipment being ordered: Diabetes Shoes (Patient not  taking: Reported on 11/9/2020) 1 Units 0     order for DME Equipment being ordered: Custom diabetic shoes (Patient not taking: Reported on 11/9/2020) 1 Units 0     order for DME Equipment being ordered: Other: Velcro Compression stockings.   Treatment Diagnosis: bilateral lymphedema. (Patient not taking: Reported on 11/9/2020) 2 each 0         Past Medical Hx:  Past Medical History:   Diagnosis Date     Atrial fibrillation (H)      Basal cell carcinoma      Chronic anticoagulation      Diastolic heart failure (H)      Dyslipidemia      Essential hypertension      Morbid obesity (H)      Sleep-disordered breathing      Type 2 diabetes mellitus (H)          Family History:  Family History   Problem Relation Age of Onset     Depression Mother      Glaucoma Maternal Grandfather      Cancer No family hx of         No family history of skin cancer     Macular Degeneration No family hx of          Review of Systems:  10 systems reviewed, pertinent positives noted in my HPI      Examination:  Saw photo in media tab 3/23/21  Ulcer over dorsum of left big toe 1x1x0.3 cm  The pic also shows onychomycosis of toenails     Laboratory:  Hematology:  Recent Labs   Lab Test 11/09/20  1023 11/05/19  1123 04/17/19  0914 03/16/19  0629 03/15/19  0537 03/14/19  0617 03/13/19  0613 03/12/19  0625 03/06/19  1110 03/06/19  1110 06/18/18  1122 06/18/18  1122 06/04/18  1853 01/15/18  1021 01/05/18  1109 01/05/18  1109 09/23/17  1049 09/23/17  1049   WBC  --  11.4*  --  7.7 9.1 7.6 8.9 8.9   < > 11.9*  --   --  9.3  --    < > 6.5   < > 10.8   ANEU  --  7.6  --   --   --   --   --   --   --  8.1  --   --  5.8  --   --  3.7  --  9.0*   ALYM  --  2.2 1.9  --   --   --   --   --   --  2.5  --  2.4 2.5 2.2  --  1.8   < > 0.8   BARB  --  1.4*  --   --   --   --   --   --   --  1.1  --   --  0.8  --   --  0.7  --  1.0   AEOS  --  0.1  --   --   --   --   --   --   --  0.0  --   --  0.2  --   --  0.2  --  0.0   HGB 15.1 16.8 13.8 14.7 13.9 13.9 13.7  13.5   < > 14.8   < > 15.5 13.5 15.3   < > 13.4   < > 17.2   HCT 49.0 51.5 46.0 45.9 43.6 43.6 43.1 41.5   < > 45.1   < > 50.5 40.8 49.8   < > 42.5   < > 51.6   PLT  --  198  --  238 242 239 247 230   < > 284  --   --  195  --    < > 182   < > 158    < > = values in this interval not displayed.       Chemistry:  Recent Labs   Lab Test 11/09/20  1039 11/09/20  1023 11/05/19  1123 11/05/19  1123 10/28/19  1211 09/26/19  1129 08/27/19  0826 05/20/19  1153 03/25/19  0539 03/25/19  0539 03/23/19  0624 03/23/19  0624 03/21/19  0600 03/13/19  1701 03/13/19  1701 03/11/19  1639 03/11/19  1639 01/15/18  1021 01/15/18  1021 01/08/18  1148 01/08/18  1148 09/23/17  1049 09/23/17  1049   NA  --  135.9  --  134 135 133 136 137   < > 140  --  140 140   < >  --    < >  --    < > 132.4*   < >  --    < > 125*   POTASSIUM  --  4.0  --  3.8 4.6 3.9 3.8 4.0   < > 3.3*   < > 3.1* 3.4   < >  --    < >  --    < > 4.3   < >  --    < > 3.8   CHLORIDE  --  99.7  --  100 96 98 102 101   < > 101  --  100 104   < >  --    < >  --    < > 95.0*   < >  --    < > 87*   CO2  --  24.5  --  25 32 27 28 30   < > 30  --  30 28   < >  --    < >  --    < > 24.7   < >  --    < > 29   ANIONGAP  --   --   --  8 7 8 6 7  --  9  --  10 8   < >  --    < >  --    < >  --    < >  --    < > 10   BUN  --  19.6  --  25 24 23 16 13   < > 18  --  22 24   < >  --    < >  --    < > 20.2   < >  --    < > 10   CR  --  1.1  --  1.21 1.36* 1.20 0.87 1.18   < > 0.85  --  0.99 0.91   < >  --    < >  --    < > 0.9   < >  --    < > 0.77   GFRESTIMATED  --  67.6  --  61 53* 61 88 63   < > 89  --  78 86   < >  --    < >  --    < > >90   < >  --    < > >90   GLC  --  113.1*  --  82 111* 94 132* 125*   < > 120*  --  112* 130*   < >  --    < >  --    < > 115.1*   < >  --    < > 120*   A1C 5.9*  --    < >  --   --   --   --   --   --   --   --   --   --   --   --   --   --    < >  --   --   --    < >  --    CHERI  --  9.5  --  9.0 9.3 9.4 8.9 10.0   < > 8.4*  --  8.7 8.8   < >  --    <  >  --    < > 10.0   < >  --    < > 8.2*   PHOS  --   --   --   --   --   --   --   --   --   --   --   --   --   --   --   --   --   --  3.3  --  3.4   < >  --    MAG  --   --   --   --   --   --   --   --   --   --   --  2.2 2.1   < >  --    < >  --    < > 2.4*   < > 2.3   < > 1.5*   LACT  --   --   --   --   --   --   --   --   --   --   --   --   --   --  1.5  --  0.9   < >  --   --   --    < >  --    CKT  --   --   --   --   --   --   --   --   --   --   --   --   --   --   --   --   --   --   --   --   --   --  84    < > = values in this interval not displayed.       Liver Function Studies:  Recent Labs   Lab Test 11/09/20  1023 11/05/19  1123 03/06/19  1110 12/11/18  1125 08/28/18  1358 06/04/18  1853 01/05/18  1109 12/26/17  1012 09/23/17  1049 09/23/17  1049   BILITOTAL 0.8 0.4 0.5 0.9 0.7 1.1 1.1 1.0   < > 1.0   ALKPHOS 86.4 80 105 88.2 81.8 79 87 97   < > 70   ALBUMIN  --  3.0* 3.5  --   --  3.3* 3.1* 3.3*  --  2.6*   AST 19.3 21 14 20.7 18.0 16 16 22   < > 17   ALT 12.9 21 18 19.8 18.0 22 16 15   < > 21    < > = values in this interval not displayed.     Hb A1c 10/2020 - 5.9     Microbiology:  3/23/21 wound cx left big toe - normal michael     Imaging:  X ray left foot 3/23/21  IMPRESSION: Erosive changes involving the left great toe distal  phalanx, best seen on the lateral view, changed since the comparison  radiographs from 3/6/2019, presumably representing age indeterminant  osteomyelitis. Subtle lucency involving the left great toe distal  phalanx. Correlate with clinical exam for osteomyelitis.

## 2021-03-29 NOTE — PROGRESS NOTES
Patient has been on abx now for a few days and feels that it is helping.      Clarke is a 70 year old who is being evaluated via a billable telephone visit.      What phone number would you like to be contacted at? [phone]  How would you like to obtain your AVS? Mail a copy  Phone call duration: 25 minutes  Total time including chart review, care-coordination and documentation time on the date of encounter - 47 mins                [facility]  Infectious Disease Consultation note  Today's Date: 03/29/2021    Recommendations:  Recommend continuing augmentin for 4 weeks total with food.   Encouraged yogurt and OTC probiotic while on abx  Follow up with Dr. Lagos in 4 weeks. If wound is better, abx can be continued (for total of 6 or 8 weeks) or stopped based on degree of improvement of the wound. If the wound has not made progress, then recommend sending deeper cxs again and trying alternative abx of levaquin 750 mg daily and flagyl 500 mg po tid for 4 weeks and going from there.   Can consider treating onychomycosis of toe nails with systemic anti-fungals after infection resolves.   Recommend shingrix vaccination after covid.    I will be on leave in 4 weeks, but if Dr. Lagos would like assistance, patient can see a colleague of mine. Patient aware and agreeable    Thank you for involving Infectious Disease in the care of this patient.   Mahi Kirkpatrick  , [facility]  Pager - [phone]    Assessment:  Clarke Moreira is a 70 year old with PMH of DM (well controlled, last A1c 5.9),  HTN, CHF on coumadin, Right big toe osteomyelitis Sep 2019 s/p 6 weeks regimen of levaquin and flagyl- which cleared it up, onychomycosis of toe nails    Left big toe osteomyelitis of distal phalanx: wound appeared in the past few weeks after likely poor hygiene and trauma. X ray with changes, wound cx with normal michael.   Started on augmentin 3-4 days ago. Recommend  continuing the same. If in 4 weeks no improvement in wound, then recommend sending deep wound cxs again and can change abx  to levaquin 750 mg daily and flagyl 500 mg po tid.  If wound is better, abx can be continued (for total of 6 or 8 weeks) or stopped based on degree of improvement of the wound.     - Immunization status: not immunized against shingrix    Attestation: I have reviewed today's vital signs, medications, labs and imaging. I personally reviewed the imaging. Reviewed medical history, surgical history, and family history in Epic History tab. No updates needed to be made.     -------------------------------------------------------------------------------------------------------------------    Reason for consult / Chief complaint:   Consulted by Dr. Lagos for diabetic foot infection    History of presenting illness:  Clarke Moreira is a 70 year old with PMH of DM (well controlled, last A1c 5.9),  HTN, CHF on coumadin     He reports that he had Right big toe osteomyelitis Sep 2019 - he saw ID- he rcvd 6 weeks regimen of levaquin and flagyl- which cleared it up     Since Nov 2020- developed pressure pain with standing, walking in left big toe  He left a bandage there on for 3 weeks without changing. He admits to poor hygiene. 2 weeks ago when he removed it there was a wound there which looked like a little volcano  He Saw Dr. Lagos who did x ray and sent cxs after debriding. The x ray showed osteomyelitis of distal phalanx and cxs grew normal michael. He started him on augmentin 875 po bid x 2 weeks, 3-4 days ago.   Patient has side effect of Diarrhea with augmentin - mild.       Social Hx:  Social History     Tobacco Use     Smoking status: Never Smoker     Smokeless tobacco: Never Used   Substance Use Topics     Alcohol use: No     Comment: Former alcohol abuse. Quit 70s then quit later in 80s-present     Drug use: No     Comment: history of LSD use         Immunizations:  Immunization History    Administered Date(s) Administered     Influenza (High Dose) 3 valent vaccine 10/30/2019     Influenza (IIV3) PF 12/15/2005, 03/08/2007, 01/25/2008, 10/01/2010, 12/02/2011, 09/06/2017     Influenza, Quad, High Dose, Pf, 65yr + 11/09/2020     Mantoux Tuberculin Skin Test 10/09/2017, 03/23/2019     Pneumo Conj 13-V (2010&after) 11/27/2017     Pneumococcal 23 valent 03/24/2019     TDAP Vaccine (Boostrix) 11/27/2017     Tdap (Adacel,Boostrix) 03/08/2007         Allergies:   Allergies   Allergen Reactions     Seasonal Allergies          Medications:  Current Outpatient Medications   Medication Sig Dispense Refill     ammonium lactate (LAC-HYDRIN) 12 % external cream Apply topically 2 times daily as needed for dry skin 385 g 11     amoxicillin-clavulanate (AUGMENTIN) 875-125 MG tablet Take 1 tablet by mouth 2 times daily With food 28 tablet 0     aspirin (ASA) 81 MG tablet Take 1 tablet (81 mg) by mouth daily 30 tablet 0     atorvastatin (LIPITOR) 40 MG tablet Take 1 tablet (40 mg) by mouth daily 30 tablet 11     metFORMIN (GLUCOPHAGE) 500 MG tablet Take 1 tablet (500 mg) by mouth 2 times daily (with meals) 60 tablet 11     metoprolol succinate ER (TOPROL-XL) 100 MG 24 hr tablet Take 1 tablet (100 mg) by mouth daily 30 tablet 11     multivitamin w/minerals (MULTI-VITAMIN) tablet Take 1 tablet by mouth daily 30 each 0     nystatin (MYCOSTATIN) 962790 UNIT/GM external cream Apply topically 2 times daily as needed for dry skin 30 g 11     omega 3 1000 MG CAPS Take 1 g by mouth daily 30 capsule 11     order for DME Equipment being ordered: Bariatric Lift chair  Diagnosis - morbid obesity, CHF, Lymphedema    Fax to Ne from [employer] at [phone]. 1 Units 0     potassium chloride ER (KLOR-CON M) 20 MEQ CR tablet Take 2 tablets (40 mEq) by mouth 2 times daily 120 tablet 11     senna-docusate (SENOKOT-S/PERICOLACE) 8.6-50 MG tablet Take 1-2 tablets by mouth 2 times daily as needed for constipation 60 tablet 3      spironolactone (ALDACTONE) 25 MG tablet Take 2 tablets (50 mg) by mouth daily 180 tablet 1     tamsulosin (FLOMAX) 0.4 MG capsule Take 1 capsule (0.4 mg) by mouth daily 30 capsule 11     torsemide (DEMADEX) 100 MG tablet Take 1.5 tablets (150 mg) by mouth daily 30 tablet 11     vitamin B complex with vitamin C (VITAMIN  B COMPLEX) tablet Take 1 tablet by mouth daily 100 tablet 3     vitamin C (ASCORBIC ACID) 1000 MG TABS Take 1,000 mg by mouth daily       vitamin E (VITAMIN E COMPLEX) 1000 units (450 mg) capsule Take 1 capsule (1,000 Units) by mouth daily 100 capsule 3     warfarin ANTICOAGULANT (COUMADIN) 5 MG tablet Take 10 mg by mouth  5x/week, Take 5 mg by mouth 2x per week as directed 60 tablet 3     Ascorbic Acid (VITAMIN C) POWD Take 500 mcg by mouth daily  0     bacitracin 500 UNIT/GM external ointment Apply 30 g topically 2 times daily (Patient not taking: Reported on 11/9/2020) 30 g 1     blood glucose (ONETOUCH ULTRA) test strip Use to test blood sugars 2 times daily or as directed. (Patient not taking: Reported on 3/29/2021) 100 strip 11     blood glucose monitoring (ONE TOUCH DELICA) lancets Use to test blood sugars 2 times daily or as directed. (Patient not taking: Reported on 3/29/2021) 100 each 11     blood glucose monitoring (ONE TOUCH ULTRA MINI) meter device kit Use to test blood sugars 2 times daily or as directed. (Patient not taking: Reported on 3/29/2021) 1 kit 0     levothyroxine (SYNTHROID/LEVOTHROID) 175 MCG tablet Take 1 tablet (175 mcg) by mouth every morning (before breakfast) 90 tablet 3     order for DME Equipment being ordered: Diabetes Shoes (Patient not taking: Reported on 11/9/2020) 1 Units 0     order for DME Equipment being ordered: Custom diabetic shoes (Patient not taking: Reported on 11/9/2020) 1 Units 0     order for DME Equipment being ordered: Other: Velcro Compression stockings.   Treatment Diagnosis: bilateral lymphedema. (Patient not taking: Reported on 11/9/2020) 2 each  0         Past Medical Hx:  Past Medical History:   Diagnosis Date     Atrial fibrillation (H)      Basal cell carcinoma      Chronic anticoagulation      Diastolic heart failure (H)      Dyslipidemia      Essential hypertension      Morbid obesity (H)      Sleep-disordered breathing      Type 2 diabetes mellitus (H)          Family History:  Family History   Problem Relation Age of Onset     Depression Mother      Glaucoma Maternal Grandfather      Cancer No family hx of         No family history of skin cancer     Macular Degeneration No family hx of          Review of Systems:  10 systems reviewed, pertinent positives noted in my HPI      Examination:  Saw photo in media tab 3/23/21  Ulcer over dorsum of left big toe 1x1x0.3 cm  The pic also shows onychomycosis of toenails     Laboratory:  Hematology:  Recent Labs   Lab Test 11/09/20  1023 11/05/19  1123 04/17/19  0914 03/16/19  0629 03/15/19  0537 03/14/19  0617 03/13/19  0613 03/12/19  0625 03/06/19  1110 03/06/19  1110 06/18/18  1122 06/18/18  1122 06/04/18  1853 01/15/18  1021 01/05/18  1109 01/05/18  1109 09/23/17  1049 09/23/17  1049   WBC  --  11.4*  --  7.7 9.1 7.6 8.9 8.9   < > 11.9*  --   --  9.3  --    < > 6.5   < > 10.8   ANEU  --  7.6  --   --   --   --   --   --   --  8.1  --   --  5.8  --   --  3.7  --  9.0*   ALYM  --  2.2 1.9  --   --   --   --   --   --  2.5  --  2.4 2.5 2.2  --  1.8   < > 0.8   BARB  --  1.4*  --   --   --   --   --   --   --  1.1  --   --  0.8  --   --  0.7  --  1.0   AEOS  --  0.1  --   --   --   --   --   --   --  0.0  --   --  0.2  --   --  0.2  --  0.0   HGB 15.1 16.8 13.8 14.7 13.9 13.9 13.7 13.5   < > 14.8   < > 15.5 13.5 15.3   < > 13.4   < > 17.2   HCT 49.0 51.5 46.0 45.9 43.6 43.6 43.1 41.5   < > 45.1   < > 50.5 40.8 49.8   < > 42.5   < > 51.6   PLT  --  198  --  238 242 239 247 230   < > 284  --   --  195  --    < > 182   < > 158    < > = values in this interval not displayed.       Chemistry:  Recent Labs   Lab Test  11/09/20  1039 11/09/20  1023 11/05/19  1123 11/05/19  1123 10/28/19  1211 09/26/19  1129 08/27/19  0826 05/20/19  1153 03/25/19  0539 03/25/19  0539 03/23/19  0624 03/23/19  0624 03/21/19  0600 03/13/19  1701 03/13/19  1701 03/11/19  1639 03/11/19  1639 01/15/18  1021 01/15/18  1021 01/08/18  1148 01/08/18  1148 09/23/17  1049 09/23/17  1049   NA  --  135.9  --  134 135 133 136 137   < > 140  --  140 140   < >  --    < >  --    < > 132.4*   < >  --    < > 125*   POTASSIUM  --  4.0  --  3.8 4.6 3.9 3.8 4.0   < > 3.3*   < > 3.1* 3.4   < >  --    < >  --    < > 4.3   < >  --    < > 3.8   CHLORIDE  --  99.7  --  100 96 98 102 101   < > 101  --  100 104   < >  --    < >  --    < > 95.0*   < >  --    < > 87*   CO2  --  24.5  --  25 32 27 28 30   < > 30  --  30 28   < >  --    < >  --    < > 24.7   < >  --    < > 29   ANIONGAP  --   --   --  8 7 8 6 7  --  9  --  10 8   < >  --    < >  --    < >  --    < >  --    < > 10   BUN  --  19.6  --  25 24 23 16 13   < > 18  --  22 24   < >  --    < >  --    < > 20.2   < >  --    < > 10   CR  --  1.1  --  1.21 1.36* 1.20 0.87 1.18   < > 0.85  --  0.99 0.91   < >  --    < >  --    < > 0.9   < >  --    < > 0.77   GFRESTIMATED  --  67.6  --  61 53* 61 88 63   < > 89  --  78 86   < >  --    < >  --    < > >90   < >  --    < > >90   GLC  --  113.1*  --  82 111* 94 132* 125*   < > 120*  --  112* 130*   < >  --    < >  --    < > 115.1*   < >  --    < > 120*   A1C 5.9*  --    < >  --   --   --   --   --   --   --   --   --   --   --   --   --   --    < >  --   --   --    < >  --    CHERI  --  9.5  --  9.0 9.3 9.4 8.9 10.0   < > 8.4*  --  8.7 8.8   < >  --    < >  --    < > 10.0   < >  --    < > 8.2*   PHOS  --   --   --   --   --   --   --   --   --   --   --   --   --   --   --   --   --   --  3.3  --  3.4   < >  --    MAG  --   --   --   --   --   --   --   --   --   --   --  2.2 2.1   < >  --    < >  --    < > 2.4*   < > 2.3   < > 1.5*   LACT  --   --   --   --   --   --   --   --   --    --   --   --   --   --  1.5  --  0.9   < >  --   --   --    < >  --    CKT  --   --   --   --   --   --   --   --   --   --   --   --   --   --   --   --   --   --   --   --   --   --  84    < > = values in this interval not displayed.       Liver Function Studies:  Recent Labs   Lab Test 11/09/20  1023 11/05/19  1123 03/06/19  1110 12/11/18  1125 08/28/18  1358 06/04/18  1853 01/05/18  1109 12/26/17  1012 09/23/17  1049 09/23/17  1049   BILITOTAL 0.8 0.4 0.5 0.9 0.7 1.1 1.1 1.0   < > 1.0   ALKPHOS 86.4 80 105 88.2 81.8 79 87 97   < > 70   ALBUMIN  --  3.0* 3.5  --   --  3.3* 3.1* 3.3*  --  2.6*   AST 19.3 21 14 20.7 18.0 16 16 22   < > 17   ALT 12.9 21 18 19.8 18.0 22 16 15   < > 21    < > = values in this interval not displayed.     Hb A1c 10/2020 - 5.9     Microbiology:  3/23/21 wound cx left big toe - normal michael     Imaging:  X ray left foot 3/23/21  IMPRESSION: Erosive changes involving the left great toe distal  phalanx, best seen on the lateral view, changed since the comparison  radiographs from 3/6/2019, presumably representing age indeterminant  osteomyelitis. Subtle lucency involving the left great toe distal  phalanx. Correlate with clinical exam for osteomyelitis.

## 2021-03-29 NOTE — PROGRESS NOTES
"When opening a documentation only encounter, be sure to enter in \"Chief Complaint\" Forms and in \" Comments\" Title of form, description if needed.    Clarke is a 70 year old  male  Form received via: Fax  Form now resides in: Provider Ready    Yajaira Longoria CMA    Form has been completed by provider.     Form sent out via: Fax to [phone]  Patient informed:   Output date: March 30, 2021    Yajaira Longoria CMA          "

## 2021-03-29 NOTE — TELEPHONE ENCOUNTER
"Request for medication refill: levothyroxine    Providers if patient needs an appointment and you are willing to give a one month supply please refill for one month and  send a letter/MyChart using \".SMILLIMITEDREFILL\" .smillimited and route chart to \"P SMI \" (Giving one month refill in non controlled medications is strongly recommended before denial)    If refill has been denied, meaning absolutely no refills without visit, please complete the smart phrase \".smirxrefuse\" and route it to the \"P SMI MED REFILLS\"  pool to inform the patient and the pharmacy.    Yajaira Longoria, CMA        "

## 2021-03-30 LAB
BACTERIA SPEC CULT: NORMAL
Lab: NORMAL
SPECIMEN SOURCE: NORMAL

## 2021-03-31 ENCOUNTER — TELEPHONE (OUTPATIENT)
Dept: PHARMACY | Facility: CLINIC | Age: 71
End: 2021-03-31

## 2021-03-31 ENCOUNTER — VIRTUAL VISIT (OUTPATIENT)
Dept: CARDIOLOGY | Facility: CLINIC | Age: 71
End: 2021-03-31
Attending: INTERNAL MEDICINE
Payer: COMMERCIAL

## 2021-03-31 DIAGNOSIS — I50.32 CHRONIC HEART FAILURE WITH PRESERVED EJECTION FRACTION (H): ICD-10-CM

## 2021-03-31 DIAGNOSIS — I48.20 CHRONIC ATRIAL FIBRILLATION (H): Primary | ICD-10-CM

## 2021-03-31 DIAGNOSIS — E78.5 HYPERLIPIDEMIA LDL GOAL <100: Primary | ICD-10-CM

## 2021-03-31 PROCEDURE — 99213 OFFICE O/P EST LOW 20 MIN: CPT | Mod: 95 | Performed by: INTERNAL MEDICINE

## 2021-03-31 NOTE — PROGRESS NOTES
Cardiovascular Medicine    Patient gave consent for visit by telephone.  Phone duration 25 minutes      Problem List  1, Chronic systolic heart failure  2. Morbid obesity  3. Sleep disordered breathing  4. Atrial fibrillation  5. Hyperlipidemia  6. Multi-faceted shortness of breath  7. Anxiety/depression  8. Type II DM  9. Warfarin anticoagulation  10. Allergies: none  11. Lymphedema  12. Hypertension  13. Sepsis  14. Diabetic neuropathy  15. Osteomyelitis of the right great toe, on treatment  16. Lymphedema     Referring:   Matthias Sanchez M.D.  [facility] Reddy - [facility]    Plan:  COVID vaccine  CBC, CMP, BNP, Lipid, CRP inflammation  Echocardiogram  Follow-up telephone visit  Prescriptive socks per Dr. Lagos and lymphedema      History    70 year old male seen for follow-up evaluation and treatment regarding congestive heart failure in the context of morbid obesity.   At this appointment, he is feeling overall better. Still some positional dizziness, but improved. He is markedly increase in weight. He is not having any changes to his breathing. No exertional chest pain.  does not have an ICD.  He has poor baseline exercise tolerance, walks with a walker and has some home physical therapy.    He feels like his depression is better controlled at this time. He is not eating uncontrollably at this time. He is still having some disassociative symptoms, which are longstanding for him. He is seeing a psychotherapist.     He has not been vaccinated because of access issues.  Precautions and necessity discussed.    He is receiving treatment for osteomyelitis of toesl      Wt Readings from Last 24 Encounters:   11/09/20 (!) 180.1 kg (397 lb)   11/07/19 (!) 168.8 kg (372 lb 1.6 oz)   10/30/19 (!) 170.6 kg (376 lb 1.6 oz)   09/26/19 (!) 175.1 kg (386 lb)   09/18/19 (!) 176.9 kg (390 lb 1.6 oz)   09/03/19 (!) 179.2 kg (395 lb)   08/27/19 (!) 178.8 kg (394 lb 3.2 oz)   06/13/19 (!) 173.9 kg (383 lb 6.4 oz)    05/20/19 (!) 173.9 kg (383 lb 6.4 oz)   05/16/19 (!) 174.5 kg (384 lb 12.8 oz)   05/10/19 (!) 174.5 kg (384 lb 12.8 oz)   04/17/19 (!) 172.7 kg (380 lb 12.8 oz)   04/02/19 (!) 168.9 kg (372 lb 6.4 oz)   04/02/19 (!) 170 kg (374 lb 12.8 oz)   03/20/19 (!) 161.8 kg (356 lb 11.3 oz)   02/19/19 (!) 175.4 kg (386 lb 9.6 oz)   12/12/18 (!) 175.1 kg (386 lb)   12/11/18 (!) 176.3 kg (388 lb 9.6 oz)   10/30/18 (!) 175.7 kg (387 lb 6.4 oz)   10/22/18 (!) 175.5 kg (387 lb)   10/10/18 (!) 173.3 kg (382 lb)   09/07/18 (!) 167.8 kg (369 lb 14.4 oz)   08/28/18 (!) 167.8 kg (370 lb)   07/18/18 (!) 170.1 kg (375 lb)       Occupational: middle management    Longstanding depression    Family: orphan    Allergy: none    Surgery: broken bones    Has bariatric bed and chair     REVIEW of SYMPTOMS    Constitutional: without fever, chills, night sweats.  Weight is down 5 lbs in the last week  HEENT: without dry eyes, dry mouth  Endocrine: without polyuria, polydypsia, polyphagia, heat or cold intolerance, changing mental status. Hemoglobin A1C modestly elevated  Cardiology: without chest pain, tightness, heaviness, pressure, paroxysmal dyspnea, orthopnea, palpitation, pre-syncope or syncope   Pulmonary: without asthma, wheezing, cough, hemoptysis  GI: without nausea, emesis, jaundice, pain, hematemesis, melena  : without frequency, urgency, hematuria, stones, pain, abnormal bleeding, frequency, urgency but history of urinary retention requiring catherization   Neurologic: without TIA, CVA, trauma, seizure  Dermatologic: without lesions, abrasion rash,   Orthopedic/Rheum: without significant joint pain, impairment, limb, polyserositis, ulceration, Raynauds  Heme: without mass, bruising, frequent infection, anemia  Psychiatric: without substance abuse, hallucination, medication, depression      Meds    Current Outpatient Medications   Medication     ammonium lactate (LAC-HYDRIN) 12 % external cream     amoxicillin-clavulanate (AUGMENTIN)  875-125 MG tablet     Ascorbic Acid (VITAMIN C) POWD     aspirin (ASA) 81 MG tablet     atorvastatin (LIPITOR) 40 MG tablet     bacitracin 500 UNIT/GM external ointment     blood glucose (ONETOUCH ULTRA) test strip     blood glucose monitoring (ONE TOUCH DELICA) lancets     blood glucose monitoring (ONE TOUCH ULTRA MINI) meter device kit     levothyroxine (SYNTHROID/LEVOTHROID) 175 MCG tablet     metFORMIN (GLUCOPHAGE) 500 MG tablet     metoprolol succinate ER (TOPROL-XL) 100 MG 24 hr tablet     multivitamin w/minerals (MULTI-VITAMIN) tablet     nystatin (MYCOSTATIN) 287694 UNIT/GM external cream     omega 3 1000 MG CAPS     order for DME     order for DME     order for DME     order for DME     potassium chloride ER (KLOR-CON M) 20 MEQ CR tablet     senna-docusate (SENOKOT-S/PERICOLACE) 8.6-50 MG tablet     spironolactone (ALDACTONE) 25 MG tablet     tamsulosin (FLOMAX) 0.4 MG capsule     torsemide (DEMADEX) 100 MG tablet     vitamin B complex with vitamin C (VITAMIN  B COMPLEX) tablet     vitamin C (ASCORBIC ACID) 1000 MG TABS     vitamin E (VITAMIN E COMPLEX) 1000 units (450 mg) capsule     warfarin ANTICOAGULANT (COUMADIN) 5 MG tablet     No current facility-administered medications for this visit.      Labs    Results for AJIT CALLAHAN (MRN [id number]) as of 11/11/2020 21:33   Ref. Range 11/9/2020 10:23 11/9/2020 10:39   Sodium Latest Ref Range: 132.6 - 141.4 mmol/L 135.9    Potassium Latest Ref Range: 3.3 - 4.5 mmol/L 4.0    Chloride Latest Ref Range: 98.0 - 110.0 mmol/L 99.7    Carbon Dioxide Latest Ref Range: 20.0 - 32.0 mmol/L 24.5    Urea Nitrogen Latest Ref Range: 7.0 - 21.0 mg/dL 19.6    Creatinine Latest Ref Range: 0.7 - 1.3 mg/dL 1.1    GFR Estimate Latest Ref Range: >60.0 mL/min/1.7 m2 67.6    GFR Estimate If Black Latest Ref Range: >60.0 mL/min/1.7 m2 81.8    Calcium Latest Ref Range: 8.5 - 10.1 mg/dL 9.5    Albumin Latest Ref Range: 3.5 - 4.7 mg/dL 4.2    Protein Total Latest Ref Range: 6.8 -  8.8 g/dL 7.4    Bilirubin Total Latest Ref Range: 0.2 - 1.3 mg/dL 0.8    Alkaline Phosphatase Latest Ref Range: 31.7 - 110.7 U/L 86.4    ALT Latest Ref Range: 0.0 - 45.0 U/L 12.9    AST Latest Ref Range: 0.0 - 55.0 U/L 19.3    Hemoglobin A1C Latest Ref Range: 4.1 - 5.7 %  5.9 (H)   Cholesterol Latest Ref Range: 0.0 - 200.0 mg/dL 143.6    Cholesterol/HDL Ratio Latest Ref Range: 0.0 - 5.0  3.9    HDL Cholesterol Latest Ref Range: >40.0 mg/dL 37.2 (L)    LDL Cholesterol Calculated Latest Ref Range: 0 - 129 mg/dL 85    VLDL Cholesterol Latest Ref Range: 7.0 - 32.0 mg/dL 21.6    Triglycerides Latest Ref Range: 0.0 - 150.0 mg/dL 108.2    Glucose Latest Ref Range: 70.0 - 99.0 mg'dL 113.1 (H)    WBC Latest Ref Range: 4.0 - 11.0 K/uL 8.9    Hemoglobin Latest Ref Range: 13.3 - 17.7 g/dL 15.1    Hematocrit Latest Ref Range: 40.0 - 53.0 % 49.0    Platelets Latest Ref Range: 150.0 - 450.0 K/uL 221.0    RBC Latest Ref Range: 4.40 - 5.90 M/uL 5.09    MCV Latest Ref Range: 78.0 - 100.0 fL 96.3    MCH Latest Ref Range: 26.5 - 35.0 pg 29.7    MCHC Latest Ref Range: 32.0 - 36.0 g/dL 30.8 (L)    Sed Rate Latest Ref Range: 0 - 20 mm/hr 19    INR Unknown 1.9        Imaging     3/2019 ECHO  Interpretation Summary  Borderline (EF 50-55%) reduced left ventricular function is present.  Right ventricular systolic dysfunction appears mild.  No significant valve dysfunction.  No vegetation detected.    2018 ECHO  Interpretation Summary  Technically difficult study. The patient's rhythm is atrial fibrillation.  Global and regional left ventricular function is normal with an EF of 55-60%.  Global right ventricular function is mildly reduced.  No significant valvular abnormalities were noted.  Dilation of the inferior vena cava is present with normal respiratory  variation in diameter.Estimated mean right atrial pressure is 8 mmHg.  No pericardial effusion is present.    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 MRA for further evaluation.      Comparison MRA: None.      1. The aortic root is mildly dilated, measuring 3.9 cm at the level of sinus of Valsalva.     2. The ascending aorta measures 3.9 cm. The transverse arch and descending thoracic aorta are normal in  size without an aneurysm or dissection.     2. The aortic arch is left sided. The brachiocaphalic and left common carotid arteries both have common  origin. There is no coarctation.     3. The main and proximal branch pulmonary arteries are normal in size.      4. The systemic venous connections are normal.      6/4/2018 RHC and Coronary Angiogram        Assessment/Plan   He is doing better at this visit than in the past. He appears euvolemic. His depression is under better control. We will not make any medication changes today. His lisinopril was discontinued due to pre-syncope at prior visits, we could consider adding back cautiously in the future, but we held off for today, especially as is EF is now normal.     # Chronic borderline diastolic heart failure/HFpEF with improved EF  Stage C. NYHA Class III- although confounded by body habitus and comorbidities   Fluid status: Euvolemic today, continue current torsemide  ACEi/ARB/ARNI: Will need to reassess at visit in 3 months (see above)  BB: Toprol 100 mg daily  Aldosterone antagonist: spironolactone 50 mg daily (despite normal EF now, some benefit shown in TOPCAT trial)  SCD prophylaxis: n/a EF>40%  Ischemic evaluation: negative coronary angiogram 6/2018  Remote monitoring: uses [email or web address]      # Afib  -continue the Toprol 100 mg daily     # HTN  -controlled on HF therapies     # Atrial fibrillation ?permanant, hx of ablation per patient   [id number]   -he is on warfarin and Toprol     # THONG/alveolar hypoventilation  -encouraged consistent CPAP use       Follow-up in January with  Dr. Willoughby or [facility]      I saw patient in shared visit format and confirmed history and physical and jointly formulated plan to patient.        CC  Matthias Sanchez  [facility]  Dilia Greene, Ph.D. [employer]

## 2021-03-31 NOTE — TELEPHONE ENCOUNTER
PHARMACY TELEPHONE ENCOUNTER:    Reason: Anticoagulation       Called patient to explain the change in anticoagulation service.    Referral placed.  Home care nurse called and informed.      INR due in 3 weeks.    Tj Palumbo Pharm.D.

## 2021-03-31 NOTE — LETTER
3/31/2021      RE: Clarke Moreira  [address]  [address]  [facility] [place] [facility] [id number]       Dear Colleague,    Thank you for the opportunity to participate in the care of your patient, Clarke Moreira, at the [facility] [place] [facility]. Please see a copy of my visit note below.      Cardiovascular Medicine    Patient gave consent for visit by telephone.  Phone duration 25 minutes      Problem List  1, Chronic systolic heart failure  2. Morbid obesity  3. Sleep disordered breathing  4. Atrial fibrillation  5. Hyperlipidemia  6. Multi-faceted shortness of breath  7. Anxiety/depression  8. Type II DM  9. Warfarin anticoagulation  10. Allergies: none  11. Lymphedema  12. Hypertension  13. Sepsis  14. Diabetic neuropathy  15. Osteomyelitis of the right great toe, on treatment  16. Lymphedema     Referring:   MOLLY Medina - [facility]    Plan:  COVID vaccine  CBC, CMP, BNP, Lipid, CRP inflammation  Echocardiogram  Follow-up telephone visit  Prescriptive socks per Dr. Lagos and lymphedema      History    70 year old male seen for follow-up evaluation and treatment regarding congestive heart failure in the context of morbid obesity.   At this appointment, he is feeling overall better. Still some positional dizziness, but improved. He is markedly increase in weight. He is not having any changes to his breathing. No exertional chest pain.  does not have an ICD.  He has poor baseline exercise tolerance, walks with a walker and has some home physical therapy.    He feels like his depression is better controlled at this time. He is not eating uncontrollably at this time. He is still having some disassociative symptoms, which are longstanding for him. He is seeing a psychotherapist.     He has not been vaccinated because of access issues.  Precautions and necessity discussed.    He is receiving treatment for  osteomyelitis of toesl      Wt Readings from Last 24 Encounters:   11/09/20 (!) 180.1 kg (397 lb)   11/07/19 (!) 168.8 kg (372 lb 1.6 oz)   10/30/19 (!) 170.6 kg (376 lb 1.6 oz)   09/26/19 (!) 175.1 kg (386 lb)   09/18/19 (!) 176.9 kg (390 lb 1.6 oz)   09/03/19 (!) 179.2 kg (395 lb)   08/27/19 (!) 178.8 kg (394 lb 3.2 oz)   06/13/19 (!) 173.9 kg (383 lb 6.4 oz)   05/20/19 (!) 173.9 kg (383 lb 6.4 oz)   05/16/19 (!) 174.5 kg (384 lb 12.8 oz)   05/10/19 (!) 174.5 kg (384 lb 12.8 oz)   04/17/19 (!) 172.7 kg (380 lb 12.8 oz)   04/02/19 (!) 168.9 kg (372 lb 6.4 oz)   04/02/19 (!) 170 kg (374 lb 12.8 oz)   03/20/19 (!) 161.8 kg (356 lb 11.3 oz)   02/19/19 (!) 175.4 kg (386 lb 9.6 oz)   12/12/18 (!) 175.1 kg (386 lb)   12/11/18 (!) 176.3 kg (388 lb 9.6 oz)   10/30/18 (!) 175.7 kg (387 lb 6.4 oz)   10/22/18 (!) 175.5 kg (387 lb)   10/10/18 (!) 173.3 kg (382 lb)   09/07/18 (!) 167.8 kg (369 lb 14.4 oz)   08/28/18 (!) 167.8 kg (370 lb)   07/18/18 (!) 170.1 kg (375 lb)       Occupational: middle management    Longstanding depression    Family: orphan    Allergy: none    Surgery: broken bones    Has bariatric bed and chair     REVIEW of SYMPTOMS    Constitutional: without fever, chills, night sweats.  Weight is down 5 lbs in the last week  HEENT: without dry eyes, dry mouth  Endocrine: without polyuria, polydypsia, polyphagia, heat or cold intolerance, changing mental status. Hemoglobin A1C modestly elevated  Cardiology: without chest pain, tightness, heaviness, pressure, paroxysmal dyspnea, orthopnea, palpitation, pre-syncope or syncope   Pulmonary: without asthma, wheezing, cough, hemoptysis  GI: without nausea, emesis, jaundice, pain, hematemesis, melena  : without frequency, urgency, hematuria, stones, pain, abnormal bleeding, frequency, urgency but history of urinary retention requiring catherization   Neurologic: without TIA, CVA, trauma, seizure  Dermatologic: without lesions, abrasion rash,   Orthopedic/Rheum: without  significant joint pain, impairment, limb, polyserositis, ulceration, Raynauds  Heme: without mass, bruising, frequent infection, anemia  Psychiatric: without substance abuse, hallucination, medication, depression      Meds    Current Outpatient Medications   Medication     ammonium lactate (LAC-HYDRIN) 12 % external cream     amoxicillin-clavulanate (AUGMENTIN) 875-125 MG tablet     Ascorbic Acid (VITAMIN C) POWD     aspirin (ASA) 81 MG tablet     atorvastatin (LIPITOR) 40 MG tablet     bacitracin 500 UNIT/GM external ointment     blood glucose (ONETOUCH ULTRA) test strip     blood glucose monitoring (ONE TOUCH DELICA) lancets     blood glucose monitoring (ONE TOUCH ULTRA MINI) meter device kit     levothyroxine (SYNTHROID/LEVOTHROID) 175 MCG tablet     metFORMIN (GLUCOPHAGE) 500 MG tablet     metoprolol succinate ER (TOPROL-XL) 100 MG 24 hr tablet     multivitamin w/minerals (MULTI-VITAMIN) tablet     nystatin (MYCOSTATIN) 539417 UNIT/GM external cream     omega 3 1000 MG CAPS     order for DME     order for DME     order for DME     order for DME     potassium chloride ER (KLOR-CON M) 20 MEQ CR tablet     senna-docusate (SENOKOT-S/PERICOLACE) 8.6-50 MG tablet     spironolactone (ALDACTONE) 25 MG tablet     tamsulosin (FLOMAX) 0.4 MG capsule     torsemide (DEMADEX) 100 MG tablet     vitamin B complex with vitamin C (VITAMIN  B COMPLEX) tablet     vitamin C (ASCORBIC ACID) 1000 MG TABS     vitamin E (VITAMIN E COMPLEX) 1000 units (450 mg) capsule     warfarin ANTICOAGULANT (COUMADIN) 5 MG tablet     No current facility-administered medications for this visit.      Labs    Results for AJIT CALLAHAN (MRN [id number]) as of 11/11/2020 21:33   Ref. Range 11/9/2020 10:23 11/9/2020 10:39   Sodium Latest Ref Range: 132.6 - 141.4 mmol/L 135.9    Potassium Latest Ref Range: 3.3 - 4.5 mmol/L 4.0    Chloride Latest Ref Range: 98.0 - 110.0 mmol/L 99.7    Carbon Dioxide Latest Ref Range: 20.0 - 32.0 mmol/L 24.5    Urea  Nitrogen Latest Ref Range: 7.0 - 21.0 mg/dL 19.6    Creatinine Latest Ref Range: 0.7 - 1.3 mg/dL 1.1    GFR Estimate Latest Ref Range: >60.0 mL/min/1.7 m2 67.6    GFR Estimate If Black Latest Ref Range: >60.0 mL/min/1.7 m2 81.8    Calcium Latest Ref Range: 8.5 - 10.1 mg/dL 9.5    Albumin Latest Ref Range: 3.5 - 4.7 mg/dL 4.2    Protein Total Latest Ref Range: 6.8 - 8.8 g/dL 7.4    Bilirubin Total Latest Ref Range: 0.2 - 1.3 mg/dL 0.8    Alkaline Phosphatase Latest Ref Range: 31.7 - 110.7 U/L 86.4    ALT Latest Ref Range: 0.0 - 45.0 U/L 12.9    AST Latest Ref Range: 0.0 - 55.0 U/L 19.3    Hemoglobin A1C Latest Ref Range: 4.1 - 5.7 %  5.9 (H)   Cholesterol Latest Ref Range: 0.0 - 200.0 mg/dL 143.6    Cholesterol/HDL Ratio Latest Ref Range: 0.0 - 5.0  3.9    HDL Cholesterol Latest Ref Range: >40.0 mg/dL 37.2 (L)    LDL Cholesterol Calculated Latest Ref Range: 0 - 129 mg/dL 85    VLDL Cholesterol Latest Ref Range: 7.0 - 32.0 mg/dL 21.6    Triglycerides Latest Ref Range: 0.0 - 150.0 mg/dL 108.2    Glucose Latest Ref Range: 70.0 - 99.0 mg'dL 113.1 (H)    WBC Latest Ref Range: 4.0 - 11.0 K/uL 8.9    Hemoglobin Latest Ref Range: 13.3 - 17.7 g/dL 15.1    Hematocrit Latest Ref Range: 40.0 - 53.0 % 49.0    Platelets Latest Ref Range: 150.0 - 450.0 K/uL 221.0    RBC Latest Ref Range: 4.40 - 5.90 M/uL 5.09    MCV Latest Ref Range: 78.0 - 100.0 fL 96.3    MCH Latest Ref Range: 26.5 - 35.0 pg 29.7    MCHC Latest Ref Range: 32.0 - 36.0 g/dL 30.8 (L)    Sed Rate Latest Ref Range: 0 - 20 mm/hr 19    INR Unknown 1.9        Imaging     3/2019 ECHO  Interpretation Summary  Borderline (EF 50-55%) reduced left ventricular function is present.  Right ventricular systolic dysfunction appears mild.  No significant valve dysfunction.  No vegetation detected.    5/18/2018 ECHO  Interpretation Summary  Technically difficult study. The patient's rhythm is atrial fibrillation.  Global and regional left ventricular function is normal with an EF of  55-60%.  Global right ventricular function is mildly reduced.  No significant valvular abnormalities were noted.  Dilation of the inferior vena cava is present with normal respiratory  variation in diameter.Estimated mean right atrial pressure is 8 mmHg.  No pericardial effusion is present.    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 MRA for further evaluation.      Comparison MRA: None.      1. The aortic root is mildly dilated, measuring 3.9 cm at the level of sinus of Valsalva.     2. The ascending aorta measures 3.9 cm. The transverse arch and descending thoracic aorta are normal in  size without an aneurysm or dissection.     2. The aortic arch is left sided. The brachiocaphalic and left common carotid arteries both have common  origin. There is no coarctation.     3. The main and proximal branch pulmonary arteries are normal in size.      4. The systemic venous connections are normal.      2018 RHC and Coronary Angiogram        Assessment/Plan   He is doing better at this visit than in the past. He appears euvolemic. His depression is under better control. We will not make any medication changes today. His lisinopril was discontinued due to pre-syncope at prior visits, we could consider adding back cautiously in the future, but we held off for today, especially as is EF is now normal.     # Chronic borderline diastolic heart failure/HFpEF with improved EF  Stage C. NYHA Class III- although confounded by body habitus and comorbidities   Fluid status: Euvolemic today, continue current torsemide  ACEi/ARB/ARNI: Will need to reassess at visit in 3 months (see above)  BB: Toprol 100 mg daily  Aldosterone antagonist: spironolactone 50 mg daily (despite normal EF now, some benefit shown in TOPCAT trial)  SCD prophylaxis: n/a EF>40%  Ischemic evaluation: negative  "coronary angiogram 6/2018  Remote monitoring: uses [employer]      # Afib  -continue the Toprol 100 mg daily     # HTN  -controlled on HF therapies     # Atrial fibrillation ?permanant, hx of ablation per patient   [id number]   -he is on warfarin and Toprol     # THONG/alveolar hypoventilation  -encouraged consistent CPAP use       Follow-up in January with Dr. Willoughby or [facility]      I saw patient in shared visit format and confirmed history and physical and jointly formulated plan to patient.        CC  Matthias Sanchez  [facility]  Dilia Greene, Ph.D. [employer] Jacey Mir is a 70 year old who is being evaluated via a billable telephone visit.      What phone number would you like to be contacted at? [phone]  How would you like to obtain your AVS? Mail a copy    Vitals - Patient Reported  Height (Patient Reported): 180.3 cm (5' 11\")  Pain Score: No Pain (0)(No SOB)      Vitals were taken and medications where reconciled.   Davion Corbett, EMT  8:43 AM    Please do not hesitate to contact me if you have any questions/concerns.     Sincerely,     Christian Willoughby MD    "

## 2021-03-31 NOTE — PROGRESS NOTES
"Clarke is a 70 year old who is being evaluated via a billable telephone visit.      What phone number would you like to be contacted at? [phone]  How would you like to obtain your AVS? Mail a copy    Vitals - Patient Reported  Height (Patient Reported): 180.3 cm (5' 11\")  Pain Score: No Pain (0)(No SOB)      Vitals were taken and medications where reconciled.   Davion Corbett, EMT  8:43 AM      "

## 2021-03-31 NOTE — PATIENT INSTRUCTIONS
Dr. Willoughby recommends:    Labs and echocardiogram in the next week. (CBC,CMP, BNP, Lipids, and CRP inf)    Follow up telephone visit after above testing complete, in 3-4 weeks.    Thank you for your visit today.  Please call me with any questions or concerns.   Manas rAgueta RN  Cardiology Care Coordinator  [phone], press option 1 then option 4

## 2021-04-05 DIAGNOSIS — E11.9 TYPE 2 DIABETES MELLITUS WITHOUT COMPLICATION, WITHOUT LONG-TERM CURRENT USE OF INSULIN (H): ICD-10-CM

## 2021-04-05 NOTE — TELEPHONE ENCOUNTER

## 2021-04-06 ENCOUNTER — DOCUMENTATION ONLY (OUTPATIENT)
Dept: FAMILY MEDICINE | Facility: CLINIC | Age: 71
End: 2021-04-06

## 2021-04-06 DIAGNOSIS — I48.20 CHRONIC ATRIAL FIBRILLATION (H): ICD-10-CM

## 2021-04-06 NOTE — PROGRESS NOTES
Anticoagulation Management     Referral received at [facility] for warfarin & INR management.     Anticoag calendar and INR order updated per protocol.      ANTICOAGULATION / INR Latest Ref Rng & Units 3/3/2021 3/24/2021   INR  2.4 2.0        Current Warfarin dose:     Warfarin dose:     Sunday Dose: 10 mg     Monday Dose: 10 mg     Tuesday Dose: 5 mg     Wednesday Dose: 10 mg     Thursday Dose: 5 mg     Friday Dose: 10 mg     Saturday Dose: 10 mg      Sree Meehan RN

## 2021-04-15 ENCOUNTER — VIRTUAL VISIT (OUTPATIENT)
Dept: PSYCHOLOGY | Facility: CLINIC | Age: 71
End: 2021-04-15
Payer: COMMERCIAL

## 2021-04-15 DIAGNOSIS — F41.1 GAD (GENERALIZED ANXIETY DISORDER): ICD-10-CM

## 2021-04-15 DIAGNOSIS — F33.1 MODERATE EPISODE OF RECURRENT MAJOR DEPRESSIVE DISORDER (H): Primary | ICD-10-CM

## 2021-04-15 PROCEDURE — 90834 PSYTX W PT 45 MINUTES: CPT | Mod: 95 | Performed by: PSYCHOLOGIST

## 2021-04-15 NOTE — PROGRESS NOTES
"Behavioral Health Progress Note    Client Legal Name: Clarke Moreira   Client Preferred Name: Clarke   Service Type: Individual  Length of Visit:  8:20 - 9:02(42 minutes)  Attendees: patient     The following statement was read to the patient at the outset of this visit:     \"We have found that certain health care needs can be provided without the need for a face to face visit.  This service lets us provide the care you need with a phone conversation. I will have full access to your [facility] medical record during this entire phone call.   I will be taking notes for your medical record.  Since this is like an office visit, we will bill your insurance company for this service. There are potential benefits and risks of telephone visits (e.g. limits to patient confidentiality) that differ from in-person visits.?  Confidentiality still applies for telephone services, and nobody will record the visit.  It is important to be in a quiet, private space that is free of distractions (including cell phone or other devices) during the visit.??If during the course of the call I believe a telephone visit is not appropriate, you will not be charged for this service.\"     Consent has been obtained for this service by care team member: Yes     Emergency contact: Janice Kady [phone]  Closest Emergency Room: [place]  Location at time of call: home      Identifying Information and Presenting Problem:    The patient is a 70 year old American male who I have seen off and on for therapy over the past couple of years.  Clarke did have difficulties at times coming into clinic for appointments as the anxiety would often lead him to cancel appointments.  Since the pandemic started and we have been doing phone visits, Clarke reports feeling much more comfortable in all of his appointments when they are done via telephone than when he is in person.  In person appointments with all providers have always caused significant " distress due to his desire to be perceived in a particular way.  On the telephone, he feels as if he can better articulate his experience and what he is thinking as he feels less need to project a particular image to those that are caring for him.  More recently, Clarke has developed increased pain and discomfort with his toes, but his anxiety about going in for treatment was keeping him from needed appointments.  We have been focused on increasing skills to cope with anxiety so that he can stay appropriately engaged in needed healthcare.     Treatment Objective(s) Addressed in This Session:               Clarke will practice a healthy daily discipline of diet and exercise.    Clarke will learn to manage anxiety so he can make and keep appointments   Progress on / Status of Treatment Objective(s) / Homework  Not well - due to ongoing social unrest    PHQ-9 SCORE 9/3/2019 1/6/2020 11/9/2020   PHQ-9 Total Score - - -   PHQ-9 Total Score 25 21 22       CHRIS-7 SCORE 12/11/2018 5/10/2019 9/3/2019   Total Score - - -   Total Score 20 13 19     Topics Discussed/Interventions Provided:  Session today focused on a panic attack Clarke had while at the orthopedist for his toe.  Was in a closed room, wearing his mask, waiting for 40 minutes to see the physician.  Started to get so panicked, he lost time for a bit.  Felt badly how he reacted to the doctor when he came in.  Clarke had a follow up appt scheduled and cancelled it due to the level of anxiety he was having about going back to the office.  Reviewed with Clarke the positive ways in which he took care of himself by going to the appt, but also during that appt as he felt the anxiety increasing.  Problem solved strategies he can use in the future to assist himself further - ask the nurse to leave the door cracked, let the nurse know he has some anxiety and ask if she is able to come and check on him in 15 minutes if the doctor hasn't seen him yet.  If he is in the  room by himself, can consider taking the mask off for a few breaths.  Feels that he will be able to go to the f/u visit on 4/27.  Asked him to call me if he starts to have thoughts about cancelling this appt.  His antibiotic will run out before this appt - encouraged him to call the nurse to see if this is acceptable or if they want to see him sooner.      Clarke reports having increased cravings for alcohol.  Has not acted on these in anyway, but has just noticed more thoughts about this.      Assessment: The patient was engaged in the conversation today via phone.      Mental Status: Clarke sounded alert and oriented.  Speech was of normal rate and rhythm. Mood was described as more anxious and unsettled.  Affect was anxious, but appropriate to stated mood and presentation.  Thought processes were logical and coherent.  Thought content continues to include disparaging and shaming thoughts toward himself - some recognition of when this is happening.  No evidence of psychotic or paranoid features.  Reports ongoing passive suicidal thoughts, but denies any plan or any intent to do anything to harm himself.  Memory appeared grossly intact. Insight and judgment appeared good and patient exhibited good impulse control during the appointment.     Does the patient appear to be at imminent risk of harm to self/others at this time? No    The session was necessary to problem solve strategies to decrease anxiety so Clarke is able to receive the health care services that are needed.    Symptoms of depression and anxiety have continued to interfere with his ability to function at home and cause distress that can interfere with his ability to care for himself.  Ongoing psychotherapy is necessary to provide counseling and provide support.    Diagnosis (DSM-5):  Major Depression, recurrent, moderate  Generalized Anxiety Disorder  R/o persistent depressive disorder    Plan:  1. Appt with clarke on 5/3 at 11:20  2. Follow up  with orthopedics for toe on 4/27, Clarke will call their office to see if he needs to be seen sooner since antibiotics run out three days before his appt.  Asked him to call me if he starts to have thoughts about cancelling the appt so we can talk it through before he cancels it as it is very important for him to go.  3. Did not check in about a [employer] volunteer today due to the many other services we were discussing  4. Need to update diagnostic      NOTE: Treatment plan update needed on 3/10/21.  Diagnostic assessment update due 10/15/19.

## 2021-04-19 DIAGNOSIS — E11.49 TYPE II OR UNSPECIFIED TYPE DIABETES MELLITUS WITH NEUROLOGICAL MANIFESTATIONS, NOT STATED AS UNCONTROLLED(250.60) (H): ICD-10-CM

## 2021-04-19 DIAGNOSIS — E11.621 DIABETIC ULCER OF TOE OF LEFT FOOT ASSOCIATED WITH TYPE 2 DIABETES MELLITUS, WITH NECROSIS OF BONE (H): ICD-10-CM

## 2021-04-19 DIAGNOSIS — L97.524 DIABETIC ULCER OF TOE OF LEFT FOOT ASSOCIATED WITH TYPE 2 DIABETES MELLITUS, WITH NECROSIS OF BONE (H): ICD-10-CM

## 2021-04-19 NOTE — TELEPHONE ENCOUNTER
"Request for medication refill:Amox    Providers if patient needs an appointment and you are willing to give a one month supply please refill for one month and  send a letter/MyChart using \".SMILLIMITEDREFILL\" .smillimited and route chart to \"P SMI \" (Giving one month refill in non controlled medications is strongly recommended before denial)    If refill has been denied, meaning absolutely no refills without visit, please complete the smart phrase \".smirxrefuse\" and route it to the \"P SMI MED REFILLS\"  pool to inform the patient and the pharmacy.    Yajaira Longoria, CMA        "

## 2021-04-21 ENCOUNTER — TELEPHONE (OUTPATIENT)
Dept: FAMILY MEDICINE | Facility: CLINIC | Age: 71
End: 2021-04-21

## 2021-04-21 DIAGNOSIS — I48.20 CHRONIC ATRIAL FIBRILLATION (H): ICD-10-CM

## 2021-04-21 NOTE — TELEPHONE ENCOUNTER
Home care RN calling in to report she is not able to check INR today as she does not have her POC monitor. Requesting orders to check POC INR at next home care visit on Fri 4/23.     Verbal orders given. No other questions at this time.

## 2021-04-22 NOTE — TELEPHONE ENCOUNTER
Is This A New Presentation, Or A Follow-Up?: Skin Lesions [facility]    Phone Message    May a detailed message be left on voicemail: yes     Reason for Call: Order(s): Home Care Orders: Other: Wound Care      Action Taken: Message routed to:  [facility] ortho    Travel Screening: Not Applicable     Please call with verbal orders                                                                          How Severe Is Your Skin Lesion?: moderate

## 2021-04-23 ENCOUNTER — ANTICOAGULATION THERAPY VISIT (OUTPATIENT)
Dept: FAMILY MEDICINE | Facility: CLINIC | Age: 71
End: 2021-04-23

## 2021-04-23 ENCOUNTER — TRANSFERRED RECORDS (OUTPATIENT)
Dept: HEALTH INFORMATION MANAGEMENT | Facility: CLINIC | Age: 71
End: 2021-04-23

## 2021-04-23 DIAGNOSIS — I48.20 CHRONIC ATRIAL FIBRILLATION (H): ICD-10-CM

## 2021-04-23 LAB — INR PPP: 1.9 (ref 0.9–1.1)

## 2021-04-23 NOTE — PROGRESS NOTES
ANTICOAGULATION MANAGEMENT     Patient Name:  Clarke Moreira  Date:  2021    ASSESSMENT /SUBJECTIVE:    Today's INR result of 1.9 is subtherapeutic. Goal INR of 2.0-3.0      Warfarin dose taken: Warfarin taken as instructed    Diet: No new diet changes affecting INR    Medication changes/ interactions: No new medications/supplements affecting INR    Previous INR: Therapeutic     S/S of bleeding or thromboembolism: No    New injury or illness: No    Upcoming surgery, procedure or cardioversion: No    Additional findings: [place] transfer      PLAN:    Telephone call with home care nurse JAKE gore regarding INR result and instructed:     Warfarin Dosing Instructions: Continue your current warfarin dose 5mg Tue/Thu & 10mg AOD    Instructed patient to follow up no later than: 2 weeks  Orders given to  Homecare nurse/facility to recheck    Education provided: Contact [facility] Anticoagulation: [phone]  with any changes, questions or concerns.       Tomeka RN verbalizes understanding and agrees to warfarin dosing plan.    Instructed to call the Anticoagulation Clinic for any changes, questions or concerns. (#[phone])        Shaila La RN      OBJECTIVE:  Recent labs: (last 7 days)     21   INR 1.9*         No question data found.  Anticoagulation Summary  As of 2021    INR goal:  2.0-3.0   TTR:  --   INR used for dosin.9 (2021)   Warfarin maintenance plan:  5 mg (5 mg x 1) every Tue, Thu; 10 mg (5 mg x 2) all other days   Full warfarin instructions:  5 mg every Tue, Thu; 10 mg all other days   Weekly warfarin total:  60 mg   No change documented:  Shaila La RN   Plan last modified:  Sree Meehan RN (2021)   Next INR check:  2021   Priority:  Maintenance   Target end date:  Indefinite    Indications    Chronic atrial fibrillation (H) [I48.20]             Anticoagulation Episode Summary     INR check location:      Preferred lab:      Send INR reminders  to:  [facility]    Comments:        Anticoagulation Care Providers     Provider Role Specialty Phone number    Matthias Sanchez MD Referring Family Medicine [phone]         Shaila Arredondo, RN, BSN, PHN

## 2021-04-26 ENCOUNTER — TELEPHONE (OUTPATIENT)
Dept: FAMILY MEDICINE | Facility: CLINIC | Age: 71
End: 2021-04-26

## 2021-04-26 NOTE — TELEPHONE ENCOUNTER
Returned call to home care nurse to give verbal orders per protocol as requested. Nurse verbalized understanding.    Nneka Emery RN

## 2021-04-26 NOTE — TELEPHONE ENCOUNTER
[facility] Family Medicine phone call message - order or referral request from patient:     Order or referral being requested: Requested order  SN- 3x for a week until 06/24/21, 3 PRNS for OT     Additional Details:     Referral only -Specialty  Location     OK to leave a message on voice mail? Yes    Primary language: English      needed? No    Call taken on April 26, 2021 at 3:13 PM by Brittney Bass    Order request route to [facility] TRIAGE   Referrals Route to [facility] (Green/[place]/Purple) CARE COORDINATOR

## 2021-04-27 ENCOUNTER — OFFICE VISIT (OUTPATIENT)
Dept: ORTHOPEDICS | Facility: CLINIC | Age: 71
End: 2021-04-27
Payer: COMMERCIAL

## 2021-04-27 ENCOUNTER — ANCILLARY PROCEDURE (OUTPATIENT)
Dept: GENERAL RADIOLOGY | Facility: CLINIC | Age: 71
End: 2021-04-27
Attending: PODIATRIST
Payer: COMMERCIAL

## 2021-04-27 ENCOUNTER — DOCUMENTATION ONLY (OUTPATIENT)
Dept: FAMILY MEDICINE | Facility: CLINIC | Age: 71
End: 2021-04-27

## 2021-04-27 DIAGNOSIS — B35.1 OM (ONYCHOMYCOSIS): ICD-10-CM

## 2021-04-27 DIAGNOSIS — E11.9 TYPE 2 DIABETES MELLITUS WITHOUT COMPLICATION, WITHOUT LONG-TERM CURRENT USE OF INSULIN (H): ICD-10-CM

## 2021-04-27 DIAGNOSIS — E11.621 DIABETIC ULCER OF TOE OF LEFT FOOT ASSOCIATED WITH TYPE 2 DIABETES MELLITUS, WITH NECROSIS OF BONE (H): ICD-10-CM

## 2021-04-27 DIAGNOSIS — L84 TYLOMA: ICD-10-CM

## 2021-04-27 DIAGNOSIS — L97.524 DIABETIC ULCER OF TOE OF LEFT FOOT ASSOCIATED WITH TYPE 2 DIABETES MELLITUS, WITH NECROSIS OF BONE (H): Primary | ICD-10-CM

## 2021-04-27 DIAGNOSIS — E11.621 DIABETIC ULCER OF TOE OF LEFT FOOT ASSOCIATED WITH TYPE 2 DIABETES MELLITUS, WITH NECROSIS OF BONE (H): Primary | ICD-10-CM

## 2021-04-27 DIAGNOSIS — E11.49 TYPE II OR UNSPECIFIED TYPE DIABETES MELLITUS WITH NEUROLOGICAL MANIFESTATIONS, NOT STATED AS UNCONTROLLED(250.60) (H): ICD-10-CM

## 2021-04-27 DIAGNOSIS — L97.524 DIABETIC ULCER OF TOE OF LEFT FOOT ASSOCIATED WITH TYPE 2 DIABETES MELLITUS, WITH NECROSIS OF BONE (H): ICD-10-CM

## 2021-04-27 DIAGNOSIS — I48.19 PERSISTENT ATRIAL FIBRILLATION (H): ICD-10-CM

## 2021-04-27 PROCEDURE — 99213 OFFICE O/P EST LOW 20 MIN: CPT | Mod: 25 | Performed by: PODIATRIST

## 2021-04-27 PROCEDURE — 73660 X-RAY EXAM OF TOE(S): CPT | Mod: LT | Performed by: RADIOLOGY

## 2021-04-27 PROCEDURE — 97597 DBRDMT OPN WND 1ST 20 CM/<: CPT | Performed by: PODIATRIST

## 2021-04-27 RX ORDER — WARFARIN SODIUM 5 MG/1
TABLET ORAL
Qty: 60 TABLET | Refills: 3 | Status: SHIPPED | OUTPATIENT
Start: 2021-04-27 | End: 2021-05-21

## 2021-04-27 RX ORDER — ATORVASTATIN CALCIUM 40 MG/1
40 TABLET, FILM COATED ORAL DAILY
Qty: 90 TABLET | Refills: 3 | Status: SHIPPED | OUTPATIENT
Start: 2021-04-27 | End: 2022-03-29

## 2021-04-27 NOTE — LETTER
4/27/2021         RE: Clarke Moreira  [address]  [address]  [place] [address]        Dear Colleague,    Thank you for referring your patient, Clarke Moreira, to the [facility] [place]. Please see a copy of my visit note below.    Chief Complaint   Patient presents with     Follow Up     2 week follow up. Wound, left hallux.             Allergies   Allergen Reactions     Seasonal Allergies          Subjective: Clarke is a 69 year old male who presents to the clinic today for a diabetic foot ulcer. He was seen 4 weeks ago. Relates that he does have home nursing that changes the dressing every M/W/F. Pain is noted to the foot. Relates that he pinched a nerve in his hip or back a couple of weeks ago and the foot as hurt more since then. He saw Dr. Kirkpatrick in ID and he was started on 4 weeks of Augmentin. He has about 10 days of this left.     Objective  Hemoglobin A1C   Date Value Ref Range Status   11/09/2020 5.9 (H) 4.1 - 5.7 % Final   07/09/2020 5.9 (H) 0 - 5.6 % Final     Comment:     Normal <5.7% Prediabetes 5.7-6.4%  Diabetes 6.5% or higher - adopted from ADA   consensus guidelines.     02/26/2019 6.1 (H) 0 - 5.6 % Final     Comment:     Normal <5.7% Prediabetes 5.7-6.4%  Diabetes 6.5% or higher - adopted from ADA   consensus guidelines.         DP and PT pulses 1/4 BL. CRT 1 second. Absent pedal hair.  Gross and protective sensations are diminished BL.  Hammertoes to all lesser digits. Hallux malleus BL. Equinus BL. Pes planus.   Onychomycosis to toes x10.  Tyloma noted to the left 5th digit DIPJ, right dorsal hallux IPJ, and right 5th DIPJ              A diabetic wound is noted at left  dorsal hallux IPJ measuring 0.1cm x 0.1cm x 0.1cm.    Sosa Classification: 3    Wound base: Red/Granulation    Edges: tyloma    Drainage: small/serosanguinous    Odor: no    Undermining: no    Bone Exposure: No    Clinical Signs of Infection: No        After obtaining  patient consent, the wound was irrigated with copious amounts of saline. A scalpel was then used to debride the wound into dermal tissue. The wound edges were debrided back to healthy, bleeding tissue. The wound base exhibited healthy bleeding. Given the patient's lack of sensation, no anesthesia was necessary for the procedure.        Barriers to Healing: DM2 with neuropathy. Toes are contracted. Has osteomyelitis in the toe. Has previus hx of poor wound healing.     Treatment Plan: Iodosorb and DSD.     Pt Ability to Follow Plan: Good.       left toe xrays indicated in 3 weightbearing views.    Osteolysis noted to the medial proximal phalanx head, consistent with osteomyelitis.        Assessment: Clarke is a 68 YO male with DM2 and neuropathy.  Ulceration on the left dorsal hallux. Clinically, this has improved some. However, he does have increased osteolysis on the proximal phalanx head. XRs lag behind 10-14 days, so will have a better picture with another XR in 2 weeks.  I think that he will need some home nursing care 3 times a week to help him change the dressing on this.  He cannot get down to his foot.  Onychomycosis  Tyloma.      Plan:   - Pt seen and evaluated  - XRs taken and discussed with him.  - Nails debrided x 10.   - Tyloma debrided x 3.   - Wound debrided as described   - I discussed with him that he is at increased risk for amputation to the digit.   - Cont Augmentin as rx'ed by ID.   -Start Iodosorb and bordered gauze on the left hallux.  Will order home nursing to help him with this.  - Pt to return to clinic in 2 weeks.         Jesús Lagos DPM

## 2021-04-27 NOTE — TELEPHONE ENCOUNTER
"Request for medication refill:    Atorvastatin 40 mg     Providers if patient needs an appointment and you are willing to give a one month supply please refill for one month and  send a letter/MyChart using \".SMILLIMITEDREFILL\" .smillimited and route chart to \"P SMI \" (Giving one month refill in non controlled medications is strongly recommended before denial)    If refill has been denied, meaning absolutely no refills without visit, please complete the smart phrase \".smirxrefuse\" and route it to the \"P SMI MED REFILLS\"  pool to inform the patient and the pharmacy.    Guera Armstrong, CMA        "

## 2021-04-27 NOTE — PROGRESS NOTES
"When opening a documentation only encounter, be sure to enter in \"Chief Complaint\" Forms and in \" Comments\" Title of form, description if needed.    Clarke is a 70 year old  male  Form received via: Fax  Form now resides in: Provider Ready    Yajaira Longoria CMA       Form has been completed by provider.     Form sent out via: fax [phone]  Patient informed: na  Output date: May 5, 2021    Yajaira Longoria CMA      **Please close the encounter**                      "

## 2021-04-27 NOTE — NURSING NOTE
Reason For Visit:   Chief Complaint   Patient presents with     Follow Up     2 week follow up. Wound, left hallux.        Pain Assessment  Patient Currently in Pain: Yes  Primary Pain Location: Foot(Bilateral)        Allergies   Allergen Reactions     Seasonal Allergies            Whitley Munoz LPN

## 2021-04-27 NOTE — PROGRESS NOTES
Chief Complaint   Patient presents with     Follow Up     2 week follow up. Wound, left hallux.             Allergies   Allergen Reactions     Seasonal Allergies          Subjective: Clarke is a 69 year old male who presents to the clinic today for a diabetic foot ulcer. He was seen 4 weeks ago. Relates that he does have home nursing that changes the dressing every M/W/F. Pain is noted to the foot. Relates that he pinched a nerve in his hip or back a couple of weeks ago and the foot as hurt more since then. He saw Dr. Kirkpatrick in ID and he was started on 4 weeks of Augmentin. He has about 10 days of this left.     Objective  Hemoglobin A1C   Date Value Ref Range Status   11/09/2020 5.9 (H) 4.1 - 5.7 % Final   07/09/2020 5.9 (H) 0 - 5.6 % Final     Comment:     Normal <5.7% Prediabetes 5.7-6.4%  Diabetes 6.5% or higher - adopted from ADA   consensus guidelines.     02/26/2019 6.1 (H) 0 - 5.6 % Final     Comment:     Normal <5.7% Prediabetes 5.7-6.4%  Diabetes 6.5% or higher - adopted from ADA   consensus guidelines.         DP and PT pulses 1/4 BL. CRT 1 second. Absent pedal hair.  Gross and protective sensations are diminished BL.  Hammertoes to all lesser digits. Hallux malleus BL. Equinus BL. Pes planus.   Onychomycosis to toes x10.  Tyloma noted to the left 5th digit DIPJ, right dorsal hallux IPJ, and right 5th DIPJ              A diabetic wound is noted at left  dorsal hallux IPJ measuring 0.1cm x 0.1cm x 0.1cm.    Sosa Classification: 3    Wound base: Red/Granulation    Edges: tyloma    Drainage: small/serosanguinous    Odor: no    Undermining: no    Bone Exposure: No    Clinical Signs of Infection: No        After obtaining patient consent, the wound was irrigated with copious amounts of saline. A scalpel was then used to debride the wound into dermal tissue. The wound edges were debrided back to healthy, bleeding tissue. The wound base exhibited healthy bleeding. Given the patient's lack of sensation, no  anesthesia was necessary for the procedure.        Barriers to Healing: DM2 with neuropathy. Toes are contracted. Has osteomyelitis in the toe. Has previus hx of poor wound healing.     Treatment Plan: Iodosorb and DSD.     Pt Ability to Follow Plan: Good.       left toe xrays indicated in 3 weightbearing views.    Osteolysis noted to the medial proximal phalanx head, consistent with osteomyelitis.        Assessment: Clarke is a 68 YO male with DM2 and neuropathy.  Ulceration on the left dorsal hallux. Clinically, this has improved some. However, he does have increased osteolysis on the proximal phalanx head. XRs lag behind 10-14 days, so will have a better picture with another XR in 2 weeks.  I think that he will need some home nursing care 3 times a week to help him change the dressing on this.  He cannot get down to his foot.  Onychomycosis  Tyloma.      Plan:   - Pt seen and evaluated  - XRs taken and discussed with him.  - Nails debrided x 10.   - Tyloma debrided x 3.   - Wound debrided as described   - I discussed with him that he is at increased risk for amputation to the digit.   - Cont Augmentin as rx'ed by ID.   -Start Iodosorb and bordered gauze on the left hallux.  Will order home nursing to help him with this.  - Pt to return to clinic in 2 weeks.

## 2021-05-06 ENCOUNTER — CARE COORDINATION (OUTPATIENT)
Dept: CARDIOLOGY | Facility: CLINIC | Age: 71
End: 2021-05-06

## 2021-05-06 ENCOUNTER — TELEPHONE (OUTPATIENT)
Dept: FAMILY MEDICINE | Facility: CLINIC | Age: 71
End: 2021-05-06

## 2021-05-06 ENCOUNTER — ANCILLARY PROCEDURE (OUTPATIENT)
Dept: CARDIOLOGY | Facility: CLINIC | Age: 71
End: 2021-05-06
Attending: INTERNAL MEDICINE
Payer: COMMERCIAL

## 2021-05-06 ENCOUNTER — HOSPITAL ENCOUNTER (EMERGENCY)
Facility: CLINIC | Age: 71
Discharge: HOME | End: 2021-05-06
Attending: FAMILY MEDICINE | Admitting: FAMILY MEDICINE
Payer: COMMERCIAL

## 2021-05-06 VITALS
OXYGEN SATURATION: 98 % | SYSTOLIC BLOOD PRESSURE: 111 MMHG | HEART RATE: 109 BPM | BODY MASS INDEX: 55.37 KG/M2 | HEIGHT: 71 IN | TEMPERATURE: 99.6 F | DIASTOLIC BLOOD PRESSURE: 69 MMHG | RESPIRATION RATE: 20 BRPM

## 2021-05-06 DIAGNOSIS — E11.65 TYPE 2 DIABETES MELLITUS WITH HYPERGLYCEMIA, WITHOUT LONG-TERM CURRENT USE OF INSULIN (H): Primary | ICD-10-CM

## 2021-05-06 DIAGNOSIS — I48.20 CHRONIC ATRIAL FIBRILLATION (H): ICD-10-CM

## 2021-05-06 DIAGNOSIS — E11.65 TYPE 2 DIABETES MELLITUS WITH HYPERGLYCEMIA, WITHOUT LONG-TERM CURRENT USE OF INSULIN (H): ICD-10-CM

## 2021-05-06 DIAGNOSIS — I50.32 CHRONIC HEART FAILURE WITH PRESERVED EJECTION FRACTION (H): ICD-10-CM

## 2021-05-06 DIAGNOSIS — I50.22 CHRONIC SYSTOLIC CONGESTIVE HEART FAILURE (H): ICD-10-CM

## 2021-05-06 DIAGNOSIS — Z01.89 LABORATORY TEST: ICD-10-CM

## 2021-05-06 DIAGNOSIS — E78.5 HYPERLIPIDEMIA LDL GOAL <100: ICD-10-CM

## 2021-05-06 LAB
ALBUMIN SERPL-MCNC: 3 G/DL (ref 3.4–5)
ALBUMIN SERPL-MCNC: 3.4 G/DL (ref 3.4–5)
ALP SERPL-CCNC: 112 U/L (ref 40–150)
ALP SERPL-CCNC: 114 U/L (ref 40–150)
ALT SERPL W P-5'-P-CCNC: 18 U/L (ref 0–70)
ALT SERPL W P-5'-P-CCNC: 24 U/L (ref 0–70)
ANION GAP SERPL CALCULATED.3IONS-SCNC: 5 MMOL/L (ref 3–14)
ANION GAP SERPL CALCULATED.3IONS-SCNC: 6 MMOL/L (ref 3–14)
AST SERPL W P-5'-P-CCNC: 18 U/L (ref 0–45)
AST SERPL W P-5'-P-CCNC: ABNORMAL U/L (ref 0–45)
BASOPHILS # BLD AUTO: 0.1 10E9/L (ref 0–0.2)
BASOPHILS NFR BLD AUTO: 0.5 %
BILIRUB SERPL-MCNC: 0.5 MG/DL (ref 0.2–1.3)
BILIRUB SERPL-MCNC: 0.9 MG/DL (ref 0.2–1.3)
BUN SERPL-MCNC: 20 MG/DL (ref 7–30)
BUN SERPL-MCNC: 22 MG/DL (ref 7–30)
CA-I BLD-SCNC: 4.9 MG/DL (ref 4.4–5.2)
CALCIUM SERPL-MCNC: 8.8 MG/DL (ref 8.5–10.1)
CALCIUM SERPL-MCNC: 9 MG/DL (ref 8.5–10.1)
CHLORIDE SERPL-SCNC: 102 MMOL/L (ref 94–109)
CHLORIDE SERPL-SCNC: 103 MMOL/L (ref 94–109)
CHOLEST SERPL-MCNC: 101 MG/DL
CO2 BLDCOV-SCNC: 31 MMOL/L (ref 21–28)
CO2 SERPL-SCNC: 28 MMOL/L (ref 20–32)
CO2 SERPL-SCNC: 28 MMOL/L (ref 20–32)
CREAT SERPL-MCNC: 1.2 MG/DL (ref 0.66–1.25)
CREAT SERPL-MCNC: 1.35 MG/DL (ref 0.66–1.25)
CRP SERPL-MCNC: 7.4 MG/L (ref 0–8)
DIFFERENTIAL METHOD BLD: ABNORMAL
EOSINOPHIL # BLD AUTO: 0.2 10E9/L (ref 0–0.7)
EOSINOPHIL NFR BLD AUTO: 1.6 %
ERYTHROCYTE [DISTWIDTH] IN BLOOD BY AUTOMATED COUNT: 13.6 % (ref 10–15)
GFR SERPL CREATININE-BSD FRML MDRD: 52 ML/MIN/{1.73_M2}
GFR SERPL CREATININE-BSD FRML MDRD: 60 ML/MIN/{1.73_M2}
GLUCOSE BLD-MCNC: 134 MG/DL (ref 70–99)
GLUCOSE SERPL-MCNC: 123 MG/DL (ref 70–99)
GLUCOSE SERPL-MCNC: 132 MG/DL (ref 70–99)
HBA1C MFR BLD: 6.2 % (ref 0–5.6)
HCT VFR BLD AUTO: 49.4 % (ref 40–53)
HCT VFR BLD CALC: 48 %PCV (ref 40–53)
HDLC SERPL-MCNC: 37 MG/DL
HGB BLD CALC-MCNC: 16.3 G/DL (ref 13.3–17.7)
HGB BLD-MCNC: 16.1 G/DL (ref 13.3–17.7)
IMM GRANULOCYTES # BLD: 0 10E9/L (ref 0–0.4)
IMM GRANULOCYTES NFR BLD: 0.3 %
LDLC SERPL CALC-MCNC: 32 MG/DL
LYMPHOCYTES # BLD AUTO: 3.2 10E9/L (ref 0.8–5.3)
LYMPHOCYTES NFR BLD AUTO: 28.6 %
MCH RBC QN AUTO: 29.5 PG (ref 26.5–33)
MCHC RBC AUTO-ENTMCNC: 32.6 G/DL (ref 31.5–36.5)
MCV RBC AUTO: 91 FL (ref 78–100)
MONOCYTES # BLD AUTO: 1.2 10E9/L (ref 0–1.3)
MONOCYTES NFR BLD AUTO: 10.9 %
NEUTROPHILS # BLD AUTO: 6.5 10E9/L (ref 1.6–8.3)
NEUTROPHILS NFR BLD AUTO: 58.1 %
NONHDLC SERPL-MCNC: 64 MG/DL
NRBC # BLD AUTO: 0 10*3/UL
NRBC BLD AUTO-RTO: 0 /100
NT-PROBNP SERPL-MCNC: 641 PG/ML (ref 0–125)
PCO2 BLDV: 56 MM HG (ref 40–50)
PH BLDV: 7.35 PH (ref 7.32–7.43)
PLATELET # BLD AUTO: 247 10E9/L (ref 150–450)
PO2 BLDV: 40 MM HG (ref 25–47)
POTASSIUM BLD-SCNC: 4.4 MMOL/L (ref 3.4–5.3)
POTASSIUM SERPL-SCNC: 4.1 MMOL/L (ref 3.4–5.3)
POTASSIUM SERPL-SCNC: 6.2 MMOL/L (ref 3.4–5.3)
PROT SERPL-MCNC: 7.7 G/DL (ref 6.8–8.8)
PROT SERPL-MCNC: 7.8 G/DL (ref 6.8–8.8)
RBC # BLD AUTO: 5.45 10E12/L (ref 4.4–5.9)
SAO2 % BLDV FROM PO2: 70 %
SODIUM BLD-SCNC: 138 MMOL/L (ref 133–144)
SODIUM SERPL-SCNC: 136 MMOL/L (ref 133–144)
SODIUM SERPL-SCNC: 136 MMOL/L (ref 133–144)
TRIGL SERPL-MCNC: 159 MG/DL
WBC # BLD AUTO: 11.1 10E9/L (ref 4–11)

## 2021-05-06 PROCEDURE — 999N001080 HC STATISTIC GLUCOSE ED POCT

## 2021-05-06 PROCEDURE — 83880 ASSAY OF NATRIURETIC PEPTIDE: CPT | Performed by: INTERNAL MEDICINE

## 2021-05-06 PROCEDURE — 999N001079 HC STATISTIC HEMATOCRIT ED POCT

## 2021-05-06 PROCEDURE — 82330 ASSAY OF CALCIUM: CPT

## 2021-05-06 PROCEDURE — 93306 TTE W/DOPPLER COMPLETE: CPT | Performed by: STUDENT IN AN ORGANIZED HEALTH CARE EDUCATION/TRAINING PROGRAM

## 2021-05-06 PROCEDURE — 999N001077 HC STATISTIC POTASSIUM ED POCT

## 2021-05-06 PROCEDURE — 80061 LIPID PANEL: CPT | Performed by: INTERNAL MEDICINE

## 2021-05-06 PROCEDURE — 86140 C-REACTIVE PROTEIN: CPT | Performed by: INTERNAL MEDICINE

## 2021-05-06 PROCEDURE — 84075 ASSAY ALKALINE PHOSPHATASE: CPT

## 2021-05-06 PROCEDURE — 80053 COMPREHEN METABOLIC PANEL: CPT | Performed by: FAMILY MEDICINE

## 2021-05-06 PROCEDURE — 99207 PR STATISTIC IV PUSH SINGLE INITIAL SUBSTANCE: CPT | Performed by: STUDENT IN AN ORGANIZED HEALTH CARE EDUCATION/TRAINING PROGRAM

## 2021-05-06 PROCEDURE — 82247 BILIRUBIN TOTAL: CPT

## 2021-05-06 PROCEDURE — 82040 ASSAY OF SERUM ALBUMIN: CPT

## 2021-05-06 PROCEDURE — 85025 COMPLETE CBC W/AUTO DIFF WBC: CPT | Performed by: FAMILY MEDICINE

## 2021-05-06 PROCEDURE — 80048 BASIC METABOLIC PNL TOTAL CA: CPT

## 2021-05-06 PROCEDURE — 83036 HEMOGLOBIN GLYCOSYLATED A1C: CPT | Performed by: NURSE PRACTITIONER

## 2021-05-06 PROCEDURE — 99283 EMERGENCY DEPT VISIT LOW MDM: CPT | Performed by: FAMILY MEDICINE

## 2021-05-06 PROCEDURE — 84460 ALANINE AMINO (ALT) (SGPT): CPT

## 2021-05-06 PROCEDURE — 999N001076 HC STATISTIC SODIUM ED POCT

## 2021-05-06 PROCEDURE — 84155 ASSAY OF PROTEIN SERUM: CPT

## 2021-05-06 PROCEDURE — 82803 BLOOD GASES ANY COMBINATION: CPT

## 2021-05-06 RX ADMIN — Medication 5 ML: at 09:13

## 2021-05-06 ASSESSMENT — ENCOUNTER SYMPTOMS
WEAKNESS: 0
DIAPHORESIS: 0
EYE REDNESS: 0
NUMBNESS: 0
NAUSEA: 0
SHORTNESS OF BREATH: 0
ARTHRALGIAS: 0
LIGHT-HEADEDNESS: 0
CONFUSION: 0
VOMITING: 0
DYSPHORIC MOOD: 1
FEVER: 0
ABDOMINAL PAIN: 0
DIFFICULTY URINATING: 0
MYALGIAS: 0
FATIGUE: 0
DECREASED CONCENTRATION: 1
ACTIVITY CHANGE: 0
NECK STIFFNESS: 0
COLOR CHANGE: 0
HEADACHES: 0

## 2021-05-06 NOTE — CONFIDENTIAL NOTE
ANTICOAGULATION  MANAGEMENT: Discharge Review    Clarke Moreira chart reviewed for anticoagulation continuity of care    Emergency room visit on 5/6/21 for evaluation of elevated potassium and recheck per cardiology team.    Discharge disposition: Home    Results:    Potassium in ER was 4.4. Previous critical K+ was 6.2.    No results for input(s): INR, [id number], AXA in the last 168 hours.    Anticoagulation inpatient management:     not applicable     Anticoagulation discharge instructions:     Warfarin dosing: home regimen continued   Bridging: No   INR goal change: No      Medication changes affecting anticoagulation: No    Additional factors affecting anticoagulation: None    Plan     No adjustment to anticoagulation plan needed. Per 4/23/21 [facility] encounter, patient is due for a [facility] INR check tomorrow.    Patient not contacted    No adjustment to Anticoagulation Calendar was required    Shanice Blevins RN

## 2021-05-06 NOTE — DISCHARGE INSTRUCTIONS
Home.  Your outside potassium test was falsely elevated.  Recheck was normal  You are OK home and to follow up with MD  Return if any concerns.    Potassium was normal at 4.1 today in the ER lab.

## 2021-05-06 NOTE — PROGRESS NOTES
Lab called provider with a critical K+ of 6.2. Comment for lab states: Specimen moderately hemolyzed, Potassium may be falsely elevated. Dr. Mahoney recommends that K+ be rechecked today. Called and spoke with patient who states he is not able to get a ride today, needs to call [facility] mobility set up for tomorrow. Patient told he needs to have it rechecked today. Patient will call 911 to be brought to ER for recheck and treatment for hyperkalemia if needed.

## 2021-05-06 NOTE — ED PROVIDER NOTES
[place] EMERGENCY DEPARTMENT ([facility])  5/06/21      History     Chief Complaint   Patient presents with     Abnormal Labs     The history is provided by the patient and medical records.     Clarke Moreira is a 70 year old male who presents to the Emergency Department for evaluation of elevated potassium. Per [facility], patient had a critical K+ of 6.2.  He denies prior issues with potassium levels and denies associated symptoms including abdominal pain, lightheadedness, nausea, or vomiting.    Patient denies any other current physical concerns at this point. Patient has some chronic depression but is not actively suicidal.    Past Medical History:   Diagnosis Date     Atrial fibrillation (H)      Basal cell carcinoma      Chronic anticoagulation      Diastolic heart failure (H)      Dyslipidemia      Essential hypertension      Morbid obesity (H)      Sleep-disordered breathing      Type 2 diabetes mellitus (H)        Past Surgical History:   Procedure Laterality Date     BIOPSY OF SKIN LESION       MOHS MICROGRAPHIC PROCEDURE       PICC INSERTION Right 09/24/2017    5fr TL Bard PICC, 51cm (1cm external) in the R medial brachial vein w/ tip in the SVC.       Family History   Problem Relation Age of Onset     Depression Mother      Glaucoma Maternal Grandfather      Cancer No family hx of         No family history of skin cancer     Macular Degeneration No family hx of        Social History     Tobacco Use     Smoking status: Never Smoker     Smokeless tobacco: Never Used   Substance Use Topics     Alcohol use: No     Comment: Former alcohol abuse. Quit 70s then quit later in 80s-present       No current facility-administered medications for this encounter.      Current Outpatient Medications   Medication     ammonium lactate (LAC-HYDRIN) 12 % external cream     amoxicillin-clavulanate (AUGMENTIN) 875-125 MG tablet     Ascorbic Acid (VITAMIN C) POWD     aspirin (ASA)  81 MG tablet     atorvastatin (LIPITOR) 40 MG tablet     bacitracin 500 UNIT/GM external ointment     blood glucose (NO BRAND SPECIFIED) test strip     blood glucose (ONETOUCH ULTRA) test strip     blood glucose monitoring (ONE TOUCH DELICA) lancets     blood glucose monitoring (ONE TOUCH ULTRA MINI) meter device kit     levothyroxine (SYNTHROID/LEVOTHROID) 175 MCG tablet     metFORMIN (GLUCOPHAGE) 500 MG tablet     metoprolol succinate ER (TOPROL-XL) 100 MG 24 hr tablet     multivitamin w/minerals (MULTI-VITAMIN) tablet     nystatin (MYCOSTATIN) 115198 UNIT/GM external cream     omega 3 1000 MG CAPS     order for DME     order for DME     order for DME     order for DME     potassium chloride ER (KLOR-CON M) 20 MEQ CR tablet     senna-docusate (SENOKOT-S/PERICOLACE) 8.6-50 MG tablet     spironolactone (ALDACTONE) 25 MG tablet     tamsulosin (FLOMAX) 0.4 MG capsule     torsemide (DEMADEX) 100 MG tablet     vitamin B complex with vitamin C (VITAMIN  B COMPLEX) tablet     vitamin C (ASCORBIC ACID) 1000 MG TABS     vitamin E (VITAMIN E COMPLEX) 1000 units (450 mg) capsule     warfarin ANTICOAGULANT (COUMADIN) 5 MG tablet        Allergies   Allergen Reactions     Seasonal Allergies         Review of Systems   Constitutional: Negative for activity change, diaphoresis, fatigue and fever.   HENT: Negative for congestion.    Eyes: Negative for redness.   Respiratory: Negative for shortness of breath.    Cardiovascular: Negative for chest pain.   Gastrointestinal: Negative for abdominal pain, nausea and vomiting.   Genitourinary: Negative for difficulty urinating.   Musculoskeletal: Negative for arthralgias, myalgias and neck stiffness.   Skin: Negative for color change.   Neurological: Negative for weakness, light-headedness, numbness and headaches.   Psychiatric/Behavioral: Positive for decreased concentration and dysphoric mood. Negative for confusion and suicidal ideas.   All other systems reviewed and are negative.    A  "complete review of systems was performed with pertinent positives and negatives noted in the HPI, and all other systems negative.    Physical Exam   BP: (!) 153/103  Pulse: 113  Temp: 99.6  F (37.6  C)  Resp: 20  Height: 180.3 cm (5' 11\")  SpO2: 98 %  Physical Exam  Vitals signs and nursing note reviewed.   Constitutional:       General: He is not in acute distress.     Appearance: He is well-developed. He is not diaphoretic.      Comments: Patient no distress at this point alert and oriented x3.   HENT:      Head: Normocephalic and atraumatic.      Nose: Nose normal.      Mouth/Throat:      Mouth: Mucous membranes are moist.   Eyes:      General: No scleral icterus.     Extraocular Movements: Extraocular movements intact.      Conjunctiva/sclera: Conjunctivae normal.      Pupils: Pupils are equal, round, and reactive to light.   Neck:      Musculoskeletal: Normal range of motion and neck supple.   Cardiovascular:      Rate and Rhythm: Normal rate.   Pulmonary:      Effort: No respiratory distress.   Abdominal:      Tenderness: There is no abdominal tenderness.   Musculoskeletal:         General: No tenderness.      Right lower leg: Edema present.      Left lower leg: Edema present.   Skin:     General: Skin is warm and dry.      Capillary Refill: Capillary refill takes less than 2 seconds.      Findings: No rash.   Neurological:      General: No focal deficit present.      Mental Status: He is alert and oriented to person, place, and time. Mental status is at baseline.   Psychiatric:      Comments: Patient mildly flattened affect otherwise chronic denies active suicidal ideations no delusional thought pattern.           ED Course     12:20 PM  The patient was seen and examined by Shade Sanchez MD in Room ED18.     Patient without symptoms we did do a quick bedside potassium check which was 4.4. A lab was sent off noted be 4.1. Other labs stable. Discussed with patient at this point most likely lab error with the " draw as it. Was noted to be somewhat difficult according the patient. Patient this point is otherwise comfortable will be discharged following up with MD etc.    Procedures               Results for orders placed or performed during the hospital encounter of 05/06/21   CBC with platelets differential     Status: Abnormal   Result Value Ref Range    WBC 11.1 (H) 4.0 - 11.0 10e9/L    RBC Count 5.45 4.4 - 5.9 10e12/L    Hemoglobin 16.1 13.3 - 17.7 g/dL    Hematocrit 49.4 40.0 - 53.0 %    MCV 91 78 - 100 fl    MCH 29.5 26.5 - 33.0 pg    MCHC 32.6 31.5 - 36.5 g/dL    RDW 13.6 10.0 - 15.0 %    Platelet Count 247 150 - 450 10e9/L    Diff Method Automated Method     % Neutrophils 58.1 %    % Lymphocytes 28.6 %    % Monocytes 10.9 %    % Eosinophils 1.6 %    % Basophils 0.5 %    % Immature Granulocytes 0.3 %    Nucleated RBCs 0 0 /100    Absolute Neutrophil 6.5 1.6 - 8.3 10e9/L    Absolute Lymphocytes 3.2 0.8 - 5.3 10e9/L    Absolute Monocytes 1.2 0.0 - 1.3 10e9/L    Absolute Eosinophils 0.2 0.0 - 0.7 10e9/L    Absolute Basophils 0.1 0.0 - 0.2 10e9/L    Abs Immature Granulocytes 0.0 0 - 0.4 10e9/L    Absolute Nucleated RBC 0.0    Comprehensive metabolic panel     Status: Abnormal   Result Value Ref Range    Sodium 136 133 - 144 mmol/L    Potassium 4.1 3.4 - 5.3 mmol/L    Chloride 102 94 - 109 mmol/L    Carbon Dioxide 28 20 - 32 mmol/L    Anion Gap 6 3 - 14 mmol/L    Glucose 132 (H) 70 - 99 mg/dL    Urea Nitrogen 22 7 - 30 mg/dL    Creatinine 1.35 (H) 0.66 - 1.25 mg/dL    GFR Estimate 52 (L) >60 mL/min/[1.73_m2]    GFR Estimate If Black 61 >60 mL/min/[1.73_m2]    Calcium 9.0 8.5 - 10.1 mg/dL    Bilirubin Total 0.5 0.2 - 1.3 mg/dL    Albumin 3.4 3.4 - 5.0 g/dL    Protein Total 7.7 6.8 - 8.8 g/dL    Alkaline Phosphatase 112 40 - 150 U/L    ALT 18 0 - 70 U/L    AST 18 0 - 45 U/L   ISTAT gases elec ica gluc ade POCT     Status: Abnormal   Result Value Ref Range    Ph Venous 7.35 7.32 - 7.43 pH    PCO2 Venous 56 (H) 40 - 50 mm Hg     PO2 Venous 40 25 - 47 mm Hg    Bicarbonate Venous 31 (H) 21 - 28 mmol/L    O2 Sat Venous 70 %    Sodium 138 133 - 144 mmol/L    Potassium 4.4 3.4 - 5.3 mmol/L    Glucose 134 (H) 70 - 99 mg/dL    Calcium Ionized 4.9 4.4 - 5.2 mg/dL    Hemoglobin 16.3 13.3 - 17.7 g/dL    Hematocrit - POCT 48 40.0 - 53.0 %PCV   Results for orders placed or performed in visit on 05/06/21   N terminal pro BNP outpatient     Status: Abnormal   Result Value Ref Range    N-Terminal Pro Bnp 641 (H) 0 - 125 pg/mL   Comprehensive metabolic panel     Status: Abnormal   Result Value Ref Range    Sodium 136 133 - 144 mmol/L    Potassium 6.2 (HH) 3.4 - 5.3 mmol/L    Chloride 103 94 - 109 mmol/L    Carbon Dioxide 28 20 - 32 mmol/L    Anion Gap 5 3 - 14 mmol/L    Glucose 123 (H) 70 - 99 mg/dL    Urea Nitrogen 20 7 - 30 mg/dL    Creatinine 1.20 0.66 - 1.25 mg/dL    GFR Estimate 60 (L) >60 mL/min/[1.73_m2]    GFR Estimate If Black 70 >60 mL/min/[1.73_m2]    Calcium 8.8 8.5 - 10.1 mg/dL    Bilirubin Total 0.9 0.2 - 1.3 mg/dL    Albumin 3.0 (L) 3.4 - 5.0 g/dL    Protein Total 7.8 6.8 - 8.8 g/dL    Alkaline Phosphatase 114 40 - 150 U/L    ALT 24 0 - 70 U/L    AST Canceled, Test credited 0 - 45 U/L   Lipid Profile     Status: Abnormal   Result Value Ref Range    Cholesterol 101 <200 mg/dL    Triglycerides 159 (H) <150 mg/dL    HDL Cholesterol 37 (L) >39 mg/dL    LDL Cholesterol Calculated 32 <100 mg/dL    Non HDL Cholesterol 64 <130 mg/dL   CRP inflammation     Status: None   Result Value Ref Range    CRP Inflammation 7.4 0.0 - 8.0 mg/L   Hemoglobin A1c     Status: Abnormal   Result Value Ref Range    Hemoglobin A1C 6.2 (H) 0 - 5.6 %   Results for orders placed or performed in visit on 05/06/21   Echocardiogram Complete     Status: None    [facility]    [id number]  [id number]  [id number]^JAQUELINE^GEOFFREY^KAMILLA     [facility]  Echocardiography Laboratory  [address] [place] [id number]     Name: SINDY  AJIT CHINCHILLA  MRN: [id number]  : 1950  Study Date: 2021 08:29 AM  Age: 70 yrs  Gender: Male  Patient Location[facility]  Reason For Study: Chronic heart failure with preserved ejection fraction (H),  Hype  Ordering Physician: GEOFFREY PARSONS  Referring Physician: GEOFFREY PARSONS  Performed By: Devorah Allen [facility]     BSA: 2.9 m2  Height: 72 in  Weight: 397 lb  HR: 89  BP: 129/80 mmHg  ______________________________________________________________________________  Procedure  Echocardiogram with two-dimensional, color and spectral Doppler performed.  Contrast Optison. Technically difficult study. Poor acoustic windows. Optison  (NDC #3456-0682-83) given intravenously. Patient was given 5 ml mixture of 3  ml Optison and 6 ml saline. 4 ml wasted. IV start location L Forearm .  ______________________________________________________________________________  Interpretation Summary  Technically difficult study. Poor acoustic windows.  Borderline (EF 50-55%) reduced left ventricular function is present.  Global right ventricular function is mildly reduced. Mild right ventricular  dilation is present.  No significant valvular abnormalities.  IVC diameter <2.1 cm collapsing >50% with sniff suggests a normal RA pressure  of 3 mmHg.  This study was compared with the study from 2019. No significant changes  in the biventricular function based on direct visual comparison.     ______________________________________________________________________________  Left Ventricle  Left ventricular wall thickness is normal. Left ventricular size is normal.  Borderline (EF 50-55%) reduced left ventricular function is present. Diastolic  Doppler findings (E/E' ratio and/or other parameters) suggest left ventricular  filling pressures are normal. Diastolic function not assessed due to atrial  fibrillation. Mild diffuse hypokinesis is present.     Right Ventricle  Mild right ventricular dilation is present. Global  right ventricular function  is mildly reduced.     Mitral Valve  The mitral valve is normal. Trace mitral insufficiency is present.     Aortic Valve  The valve leaflets are not well visualized. On Doppler interrogation, there is  no significant stenosis or regurgitation.     Tricuspid Valve  The valve leaflets are not well visualized. Mild tricuspid insufficiency is  present. Right ventricular systolic pressure is 23mmHg above the right atrial  pressure.     Pulmonic Valve  The valve leaflets are not well visualized. Trace pulmonic insufficiency is  present.     Vessels  Sinuses of Valsalva 3.4 cm. Ascending aorta 3.7 cm. IVC diameter <2.1 cm  collapsing >50% with sniff suggests a normal RA pressure of 3 mmHg.     Pericardium  No pericardial effusion is present.     Compared to Previous Study  This study was compared with the study from 2019 . No significant  changes in the biventricular function based on direct visual comparison.     ______________________________________________________________________________  MMode/2D Measurements & Calculations  IVSd: 1.1 cm  LVIDd: 4.3 cm  LVIDs: 3.0 cm  LVPWd: 1.1 cm  FS: 29.6 %  LV mass(C)d: 154.0 grams  LV mass(C)dI: 54.0 grams/m2     Ao root diam: 3.4 cm  asc Aorta Diam: 3.7 cm  LVOT diam: 2.3 cm  LVOT area: 4.2 cm2  RWT: 0.49     Doppler Measurements & Calculations  MV E max jatinder: 94.0 cm/sec  MV dec slope: 615.0 cm/sec2  MV dec time: 0.16 sec  TR max jatinder: 239.8 cm/sec  TR max P.2 mmHg  E/E' av.0  Lateral E/e': 6.8  Medial E/e': 9.2     ______________________________________________________________________________  Report approved by: Niels Bradshaw 2021 10:04 AM           Medications - No data to display     Assessments & Plan (with Medical Decision Making)  Patient presented to emergency room from clinic for evaluation of elevated potassium. Noted be potassium 6.2 drawn. Patient feels fine otherwise no complaints no history of any acute renal  issues etc. no fatigue etc. Here in the ER we quickly had done a i-STAT potassium which was 4.4 verified with lab at 4.1. Patient feels fine I think at this point discussed with patient as he notes in the lab it was difficult for them to get blood that is more likely some lysis of red cells causing a transient false hyperkalemia because we were able to redraw here easily and normal potassium noted. No other laboratory other treatment indicated at this point. Patient discharged will continue current treatment plan follow-up with MD.       I have reviewed the nursing notes. I have reviewed the findings, diagnosis, plan and need for follow up with the patient.    Discharge Medication List as of 5/6/2021  1:36 PM          Final diagnoses:   Laboratory test     I, Kyra Wang, am serving as a trained medical scribe to document services personally performed by Shade Sanchez MD based on the provider's statements to me on May 6, 2021.  This document has been checked and approved by the attending provider.    I, Shade Sanchez MD, was physically present and have reviewed and verified the accuracy of this note documented by Kyra Wang, medical scribe.      --  Shade Sanchez MD  [facility] EMERGENCY DEPARTMENT  5/6/2021    This note was created at least in part by the use of dragon voice dictation system. Inadvertent typographical errors may still exist.  Shade Sanchez MD.    Patient evaluated in the emergency department during the COVID-19 pandemic period. Careful attention to patients safety was addressed throughout the evaluation. Evaluation and treatment management was initiated with disposition made efficiently and appropriate as possible to minimize any risk of potential exposure to patient during this evaluation.       Shade Sanchez MD  05/06/21 1603

## 2021-05-07 ENCOUNTER — TELEPHONE (OUTPATIENT)
Dept: FAMILY MEDICINE | Facility: CLINIC | Age: 71
End: 2021-05-07

## 2021-05-07 LAB — INR PPP: 2.2 (ref 0.9–1.1)

## 2021-05-07 NOTE — TELEPHONE ENCOUNTER
Requesting approval for OT home care evaluation to be moved to week of 5/10/21 d/t ongoing scheduling difficulty with patient.

## 2021-05-10 ENCOUNTER — ANTICOAGULATION THERAPY VISIT (OUTPATIENT)
Dept: FAMILY MEDICINE | Facility: CLINIC | Age: 71
End: 2021-05-10

## 2021-05-10 DIAGNOSIS — I48.20 CHRONIC ATRIAL FIBRILLATION (H): ICD-10-CM

## 2021-05-11 ENCOUNTER — TELEPHONE (OUTPATIENT)
Dept: ORTHOPEDICS | Facility: CLINIC | Age: 71
End: 2021-05-11

## 2021-05-11 ENCOUNTER — OFFICE VISIT (OUTPATIENT)
Dept: ORTHOPEDICS | Facility: CLINIC | Age: 71
End: 2021-05-11
Payer: COMMERCIAL

## 2021-05-11 ENCOUNTER — ANCILLARY PROCEDURE (OUTPATIENT)
Dept: GENERAL RADIOLOGY | Facility: CLINIC | Age: 71
End: 2021-05-11
Attending: PODIATRIST
Payer: COMMERCIAL

## 2021-05-11 DIAGNOSIS — E11.49 TYPE II OR UNSPECIFIED TYPE DIABETES MELLITUS WITH NEUROLOGICAL MANIFESTATIONS, NOT STATED AS UNCONTROLLED(250.60) (H): ICD-10-CM

## 2021-05-11 DIAGNOSIS — L97.524 DIABETIC ULCER OF TOE OF LEFT FOOT ASSOCIATED WITH TYPE 2 DIABETES MELLITUS, WITH NECROSIS OF BONE (H): Primary | ICD-10-CM

## 2021-05-11 DIAGNOSIS — E11.621 DIABETIC ULCER OF TOE OF LEFT FOOT ASSOCIATED WITH TYPE 2 DIABETES MELLITUS, WITH NECROSIS OF BONE (H): ICD-10-CM

## 2021-05-11 DIAGNOSIS — E11.621 DIABETIC ULCER OF TOE OF LEFT FOOT ASSOCIATED WITH TYPE 2 DIABETES MELLITUS, WITH NECROSIS OF BONE (H): Primary | ICD-10-CM

## 2021-05-11 DIAGNOSIS — L97.524 DIABETIC ULCER OF TOE OF LEFT FOOT ASSOCIATED WITH TYPE 2 DIABETES MELLITUS, WITH NECROSIS OF BONE (H): ICD-10-CM

## 2021-05-11 PROCEDURE — 99213 OFFICE O/P EST LOW 20 MIN: CPT | Performed by: PODIATRIST

## 2021-05-11 PROCEDURE — 73660 X-RAY EXAM OF TOE(S): CPT | Mod: LT | Performed by: RADIOLOGY

## 2021-05-11 NOTE — PROGRESS NOTES
Chief Complaint   Patient presents with     Follow Up     2 week follow up. Ulceration on the left dorsal hallux.            Allergies   Allergen Reactions     Seasonal Allergies          Subjective: Clarke is a 69 year old male who presents to the clinic today for a diabetic foot ulcer. Home nursing coming every other day. Pain has reduced in the toes. No drainage from the areas. He has finished the abx.     Objective  Hemoglobin A1C   Date Value Ref Range Status   05/06/2021 6.2 (H) 0 - 5.6 % Final     Comment:     Normal <5.7% Prediabetes 5.7-6.4%  Diabetes 6.5% or higher - adopted from ADA   consensus guidelines.     11/09/2020 5.9 (H) 4.1 - 5.7 % Final   07/09/2020 5.9 (H) 0 - 5.6 % Final     Comment:     Normal <5.7% Prediabetes 5.7-6.4%  Diabetes 6.5% or higher - adopted from ADA   consensus guidelines.         DP and PT pulses 1/4 BL. CRT 1 second. Absent pedal hair.  Gross and protective sensations are diminished BL.  Hammertoes to all lesser digits. Hallux malleus BL. Equinus BL. Pes planus.   Onychomycosis to toes x10.  Tyloma noted to the left 5th digit DIPJ, right dorsal hallux IPJ, and right 5th DIPJ              A diabetic wound is noted at left  dorsal hallux IPJ has healed. No s/s of acute infection noted. No drainage noted to the area. No pain with palpation.     left toe xrays indicated in 3 weightbearing views.    Osteolysis of the hallux proximal phalanx is stable without further erosion since the last XRs. No gas noted.        Assessment: Clarke is a 68 YO male with DM2 and neuropathy.  Ulceration on the left dorsal hallux. Clinically, this has healed.   Onychomycosis  Tyloma.      Plan:   - Pt seen and evaluated  - XRs taken and discussed with him.   - Cont Iodosorb and bordered gauze on the left hallux.  Cont home nursing to help him with this.  - Pt to return to clinic in 4 weeks.

## 2021-05-11 NOTE — LETTER
5/11/2021         RE: Clarke Moreira  [address]  [address]  [facility] [place] [address]        Dear Colleague,    Thank you for referring your patient, Clarke Moreira, to the [facility] [place]. Please see a copy of my visit note below.    Chief Complaint   Patient presents with     Follow Up     2 week follow up. Ulceration on the left dorsal hallux.            Allergies   Allergen Reactions     Seasonal Allergies          Subjective: Clarke is a 69 year old male who presents to the clinic today for a diabetic foot ulcer. Home nursing coming every other day. Pain has reduced in the toes. No drainage from the areas. He has finished the abx.     Objective  Hemoglobin A1C   Date Value Ref Range Status   05/06/2021 6.2 (H) 0 - 5.6 % Final     Comment:     Normal <5.7% Prediabetes 5.7-6.4%  Diabetes 6.5% or higher - adopted from ADA   consensus guidelines.     11/09/2020 5.9 (H) 4.1 - 5.7 % Final   07/09/2020 5.9 (H) 0 - 5.6 % Final     Comment:     Normal <5.7% Prediabetes 5.7-6.4%  Diabetes 6.5% or higher - adopted from ADA   consensus guidelines.         DP and PT pulses 1/4 BL. CRT 1 second. Absent pedal hair.  Gross and protective sensations are diminished BL.  Hammertoes to all lesser digits. Hallux malleus BL. Equinus BL. Pes planus.   Onychomycosis to toes x10.  Tyloma noted to the left 5th digit DIPJ, right dorsal hallux IPJ, and right 5th DIPJ              A diabetic wound is noted at left  dorsal hallux IPJ has healed. No s/s of acute infection noted. No drainage noted to the area. No pain with palpation.     left toe xrays indicated in 3 weightbearing views.    Osteolysis of the hallux proximal phalanx is stable without further erosion since the last XRs. No gas noted.        Assessment: Clarke is a 70 YO male with DM2 and neuropathy.  Ulceration on the left dorsal hallux. Clinically, this has healed.   Onychomycosis  Tyloma.      Plan:   - Pt seen and evaluated  -  XRs taken and discussed with him.   - Cont Iodosorb and bordered gauze on the left hallux.  Cont home nursing to help him with this.  - Pt to return to clinic in 4 weeks.     Jesús Lagos DPM

## 2021-05-11 NOTE — NURSING NOTE
Reason For Visit:   Chief Complaint   Patient presents with     Follow Up     2 week follow up. Ulceration on the left dorsal hallux.       Pain Assessment  Patient Currently in Pain: Yes  Primary Pain Location: Foot(Left)        Allergies   Allergen Reactions     Seasonal Allergies            Whitley Munoz LPN

## 2021-05-11 NOTE — TELEPHONE ENCOUNTER
Spoke with Dr. Lagos's team. Pt can keep homecare until next visit. Called pt and relayed info to pt. Pt verified understanding. Pt will call with questions or concerns.            -NIKOLE Benítez- Orthopedics

## 2021-05-12 ENCOUNTER — TELEPHONE (OUTPATIENT)
Dept: FAMILY MEDICINE | Facility: CLINIC | Age: 71
End: 2021-05-12

## 2021-05-12 NOTE — TELEPHONE ENCOUNTER
Requesting home care PT evaluation for assessment of ambulation and mobility device needs.  Requesting SW evaluation for assessment of community resource needs.    [facility] now requests orders and shares plan of care/discharge summaries for some patients through [facility].  Please REPLY TO THIS MESSAGE OR ROUTE BACK TO THE AUTHOR in order to give authorization for orders when needed.  This is considered a verbal order, you will still receive a faxed copy of orders for signature.  Thank you for your assistance in improving collaboration for our patients.

## 2021-05-13 ENCOUNTER — VIRTUAL VISIT (OUTPATIENT)
Dept: PSYCHOLOGY | Facility: CLINIC | Age: 71
End: 2021-05-13
Payer: COMMERCIAL

## 2021-05-13 DIAGNOSIS — F33.1 MODERATE EPISODE OF RECURRENT MAJOR DEPRESSIVE DISORDER (H): Primary | ICD-10-CM

## 2021-05-13 DIAGNOSIS — F41.1 GAD (GENERALIZED ANXIETY DISORDER): ICD-10-CM

## 2021-05-13 PROCEDURE — 90834 PSYTX W PT 45 MINUTES: CPT | Mod: 95 | Performed by: PSYCHOLOGIST

## 2021-05-13 NOTE — PROGRESS NOTES
"Behavioral Health Progress Note    Client Legal Name: Clarke Moreira   Client Preferred Name: Clarke   Service Type: Individual  Length of Visit:  9:03 - 9:48 (45 minutes)  Attendees: patient     The following statement was read to the patient at the outset of this visit:     \"We have found that certain health care needs can be provided without the need for a face to face visit.  This service lets us provide the care you need with a phone conversation. I will have full access to your [facility] medical record during this entire phone call.   I will be taking notes for your medical record.  Since this is like an office visit, we will bill your insurance company for this service. There are potential benefits and risks of telephone visits (e.g. limits to patient confidentiality) that differ from in-person visits.?  Confidentiality still applies for telephone services, and nobody will record the visit.  It is important to be in a quiet, private space that is free of distractions (including cell phone or other devices) during the visit.??If during the course of the call I believe a telephone visit is not appropriate, you will not be charged for this service.\"     Consent has been obtained for this service by care team member: Yes     Emergency contact: Janice Kady [phone]  Closest Emergency Room: [place]  Location at time of call: home      Identifying Information and Presenting Problem:    The patient is a 70 year old American male who I have seen off and on for therapy over the past couple of years.  Clarke did have difficulties at times coming into clinic for appointments as the anxiety would often lead him to cancel appointments.  Since the pandemic started and we have been doing phone visits, Clarke reports feeling much more comfortable in all of his appointments when they are done via telephone than when he is in person.  In person appointments with all providers have always caused significant " distress due to his desire to be perceived in a particular way.  On the telephone, he feels as if he can better articulate his experience and what he is thinking as he feels less need to project a particular image to those that are caring for him.  More recently, Clarke has developed increased pain and discomfort with his toes, but his anxiety about going in for treatment was keeping him from needed appointments.  We have been focused on increasing skills to cope with anxiety so that he can stay appropriately engaged in needed healthcare.     Treatment Objective(s) Addressed in This Session:               Clarke will practice a healthy daily discipline of diet and exercise.    Clarke will learn to manage anxiety so he can make and keep appointments   Progress on / Status of Treatment Objective(s) / Homework  Low mood, significant negative and self-deprecating thoughts related to a recent interaction in the health system.    PHQ-9 SCORE 9/3/2019 1/6/2020 11/9/2020   PHQ-9 Total Score - - -   PHQ-9 Total Score 25 21 22       CHRIS-7 SCORE 12/11/2018 5/10/2019 9/3/2019   Total Score - - -   Total Score 20 13 19     Topics Discussed/Interventions Provided:  Clarke asked that we discuss an situation that occurred last week in regard to his health/healthcare.  He described the situation in detail.  Feeling very distressed and upset about how he responded to the staff/providers at the ER.  Worked with Clarke to identify factors that contributed to distress - one factor was that he tried to advocate for himself with the nurse on the phone, but then relented and said he would go to the ER.  Was feeling upset and disgusted that he always subordinates himself to authority - feels as if it diminishes himself.  Named the emotions he was feeling and identified the red flags that tell him that he is getting angry/upset.  Discussed what approaches he can use in the future when he notices these flags - how can he take a pause for  himself.  Provided statements he can use in those moments so he can have some time to make a decision for himself.  Reviewed that he ultimately gets to decide what he will do/not do - realizing that he needs to acknowledge that there are consequences/risks associated with not following physician advice and that he is willing to take that risk.      Assessment: The patient was engaged in the conversation today via phone.      Mental Status: Clarke sounded alert and oriented.  Speech was more rapid initially due to distress.  Mood was ashamed and embarrassed.   Affect was anxious, but appropriate to stated mood and presentation.  Thought processes were logical and coherent.  Thought content continues to include disparaging and shaming thoughts toward himself.  No evidence of psychotic or paranoid features.  Reports ongoing passive suicidal thoughts, but denies any plan or any intent to do anything to harm himself.  Memory appeared grossly intact. Insight and judgment appeared good and patient exhibited good impulse control during the appointment.     Does the patient appear to be at imminent risk of harm to self/others at this time? No    The session was necessary to problem solve strategies to enhance Clarke's ability to advocate for himself when in the healthcare setting.  Symptoms of depression and anxiety have continued to interfere with his ability to function at home and cause distress that can interfere with his ability to care for himself.  Ongoing psychotherapy is necessary to provide counseling and provide support.    Diagnosis (DSM-5):  Major Depression, recurrent, moderate  Generalized Anxiety Disorder  R/o persistent depressive disorder    Plan:  1. Appt with clarke on 6/3  2.  Need to update diagnostic      NOTE: Treatment plan update needed on 3/10/21.  Diagnostic assessment update due 10/15/19.

## 2021-05-14 ENCOUNTER — DOCUMENTATION ONLY (OUTPATIENT)
Dept: FAMILY MEDICINE | Facility: CLINIC | Age: 71
End: 2021-05-14

## 2021-05-14 NOTE — PROGRESS NOTES
"When opening a documentation only encounter, be sure to enter in \"Chief Complaint\" Forms and in \" Comments\" Title of form, description if needed.    Clarke is a 70 year old  male  Form received via: Fax  Form now resides in: Provider Ready    Yajaira Longoria CMA  Form has been completed by provider.     Form sent out via: Fax to [phone]  Patient informed: NA  Output date: May 25, 2021    Yajaira Longoria CMA      **Please close the encounter**              "

## 2021-05-21 ENCOUNTER — TELEPHONE (OUTPATIENT)
Dept: FAMILY MEDICINE | Facility: CLINIC | Age: 71
End: 2021-05-21

## 2021-05-21 ENCOUNTER — ANTICOAGULATION THERAPY VISIT (OUTPATIENT)
Dept: ANTICOAGULATION | Facility: CLINIC | Age: 71
End: 2021-05-21

## 2021-05-21 DIAGNOSIS — I48.19 PERSISTENT ATRIAL FIBRILLATION (H): ICD-10-CM

## 2021-05-21 DIAGNOSIS — I48.20 CHRONIC ATRIAL FIBRILLATION (H): ICD-10-CM

## 2021-05-21 LAB — INR PPP: 1.9 (ref 0.9–1.1)

## 2021-05-21 RX ORDER — WARFARIN SODIUM 5 MG/1
TABLET ORAL
Qty: 60 TABLET | Refills: 3 | COMMUNITY
Start: 2021-05-21 | End: 2021-08-30

## 2021-05-21 NOTE — TELEPHONE ENCOUNTER
[facility] Family Medicine phone call message - order or referral request from patient:     Order or referral being requested: Requested order: social work visit -  1 telephone visit for 21 days, 1 duration. Verbal order okay.    Additional Details: Telephone visit per patient's request. To assist with Alternative Care Waiver through [facility] and possible referral to [employer] meal delivery program.    OK to leave a message on voice mail? Yes    Primary language: English      needed? No    Call taken on May 21, 2021 at 3:57 PM by Apryl Smith    Order request route to [facility] TRIAGE   Referrals Route to [facility] (Green/[place]/Purple) CARE COORDINATOR

## 2021-05-21 NOTE — PROGRESS NOTES
ANTICOAGULATION MANAGEMENT     Patient Name:  Clarke Moreira  Date:  2021    ASSESSMENT /SUBJECTIVE:    Today's INR result of 1.9 is subtherapeutic. Goal INR of 2.0-3.0      Warfarin dose taken: Warfarin taken as instructed    Diet: No new diet changes affecting INR    Medication changes/ interactions: No new medications/supplements affecting INR    Previous INR: Therapeutic     S/S of bleeding or thromboembolism: No    New injury or illness: No    Upcoming surgery, procedure or cardioversion: No    Additional findings: INR has been sub-therapeutic to baseline low the last few weeks.       PLAN:    Telephone call with home care nurse Elda regarding INR result and instructed:     Warfarin Dosing Instructions: Change your warfarin dose to 5 mg Thu; 10 mg ROW.  . (8 % change)    Instructed patient to follow up no later than: 2 weeks  Orders given to  Homecare nurse/facility to recheck    Education provided: Monitoring for clotting signs and symptoms      Elda verbalizes understanding and agrees to warfarin dosing plan.    Instructed to call the Anticoagulation Clinic for any changes, questions or concerns. (#[phone])        Marilee Monge RN      OBJECTIVE:  Recent labs: (last 7 days)     21   INR 1.9*         No question data found.  Anticoagulation Summary  As of 2021    INR goal:  2.0-3.0   TTR:  66.7 % (4 wk)   INR used for dosin.9 (2021)   Warfarin maintenance plan:  5 mg (5 mg x 1) every Thu; 10 mg (5 mg x 2) all other days   Full warfarin instructions:  5 mg every Thu; 10 mg all other days   Weekly warfarin total:  65 mg   Plan last modified:  Marilee Monge RN (2021)   Next INR check:  2021   Priority:  Maintenance   Target end date:  Indefinite    Indications    Chronic atrial fibrillation (H) [I48.20]             Anticoagulation Episode Summary     INR check location:      Preferred lab:      Send INR reminders to:  JOVITA SAMUEL     Comments:        Anticoagulation Care Providers     Provider Role Specialty Phone number    Matthias Sanchez MD Referring Family Medicine [phone]

## 2021-05-25 DIAGNOSIS — I50.32 CHRONIC HEART FAILURE WITH PRESERVED EJECTION FRACTION (H): ICD-10-CM

## 2021-05-25 DIAGNOSIS — R33.9 URINARY RETENTION: ICD-10-CM

## 2021-05-25 DIAGNOSIS — K59.00 CONSTIPATION, UNSPECIFIED CONSTIPATION TYPE: ICD-10-CM

## 2021-05-25 DIAGNOSIS — I48.0 PAROXYSMAL ATRIAL FIBRILLATION (H): ICD-10-CM

## 2021-05-25 NOTE — TELEPHONE ENCOUNTER

## 2021-05-26 RX ORDER — AMOXICILLIN 250 MG
1-2 CAPSULE ORAL 2 TIMES DAILY PRN
Qty: 60 TABLET | Refills: 3 | Status: SHIPPED | OUTPATIENT
Start: 2021-05-26 | End: 2021-09-27

## 2021-05-26 RX ORDER — TORSEMIDE 100 MG/1
150 TABLET ORAL DAILY
Qty: 30 TABLET | Refills: 11 | Status: SHIPPED | OUTPATIENT
Start: 2021-05-26 | End: 2021-11-01

## 2021-05-26 RX ORDER — METOPROLOL SUCCINATE 100 MG/1
100 TABLET, EXTENDED RELEASE ORAL DAILY
Qty: 30 TABLET | Refills: 11 | Status: SHIPPED | OUTPATIENT
Start: 2021-05-26 | End: 2022-05-09

## 2021-05-26 RX ORDER — TAMSULOSIN HYDROCHLORIDE 0.4 MG/1
0.4 CAPSULE ORAL DAILY
Qty: 30 CAPSULE | Refills: 11 | Status: SHIPPED | OUTPATIENT
Start: 2021-05-26 | End: 2022-05-09

## 2021-05-27 ENCOUNTER — DOCUMENTATION ONLY (OUTPATIENT)
Dept: FAMILY MEDICINE | Facility: CLINIC | Age: 71
End: 2021-05-27

## 2021-05-27 NOTE — PROGRESS NOTES
"When opening a documentation only encounter, be sure to enter in \"Chief Complaint\" Forms and in \" Comments\" Title of form, description if needed.    Clarke is a 71 year old  male  Form received via: Fax  Form now resides in: Provider Ready    Suri Lawson, EMT     Form has been completed by provider.     Form sent out via: Fax to [phone]   Patient informed: na  Output date: June 2, 2021    Yajaira Longoria CMA      **Please close the encounter**                      "

## 2021-06-01 ENCOUNTER — DOCUMENTATION ONLY (OUTPATIENT)
Dept: FAMILY MEDICINE | Facility: CLINIC | Age: 71
End: 2021-06-01

## 2021-06-01 NOTE — PROGRESS NOTES
"When opening a documentation only encounter, be sure to enter in \"Chief Complaint\" Forms and in \" Comments\" Title of form, description if needed.    Clarke is a 71 year old  male  Form received via: Fax  Form now resides in: Provider Ready    Soni Borja MA    Form has been completed by provider.     Form sent out via: Fax to [phone]  Patient informed: na  Output date: June 2, 2021    Yajaira Longoria CMA      **Please close the encounter**                    "

## 2021-06-04 ENCOUNTER — ANTICOAGULATION THERAPY VISIT (OUTPATIENT)
Dept: FAMILY MEDICINE | Facility: CLINIC | Age: 71
End: 2021-06-04

## 2021-06-04 DIAGNOSIS — I48.20 CHRONIC ATRIAL FIBRILLATION (H): ICD-10-CM

## 2021-06-04 LAB — INR PPP: 1.9 (ref 0.9–1.1)

## 2021-06-04 NOTE — PROGRESS NOTES
ANTICOAGULATION MANAGEMENT     Patient Name:  Clarke Moreira  Date:  6/4/2021    ASSESSMENT /SUBJECTIVE:    Today's INR result of 1.9 is subtherapeutic. Goal INR of 2.0-3.0      Warfarin dose taken: Warfarin taken as instructed    Diet: No new diet changes affecting INR    Medication changes/ interactions: No new medications/supplements affecting INR    Previous INR: Subtherapeutic     S/S of bleeding or thromboembolism: No    New injury or illness: No    Upcoming surgery, procedure or cardioversion: No    Additional findings: None      PLAN:    Warfarin Dosing Instructions: Change your warfarin dose to 10 mg daily  . (7.7 % change)    Instructed patient to follow up no later than: 2 weeks  Orders given to  Homecare nurse/facility to recheck    Education provided: None required    Telephone call with home care nurse Elda whom verbalizes understanding and agrees to plan    Instructed to call the Anticoagulation Clinic for any changes, questions or concerns. (#[phone])        Eliane Jimenez RN      OBJECTIVE:  No results for input(s): INR, [id number], AXA in the last 168 hours.      No question data found.  Anticoagulation Summary  As of 6/4/2021    INR goal:  2.0-3.0   TTR:  66.7 % (4 wk)   INR used for dosing:     Plan last modified:  Marilee Monge RN (5/21/2021)   Next INR check:     Target end date:  Indefinite    Indications    Chronic atrial fibrillation (H) [I48.20]             Anticoagulation Episode Summary     INR check location:      Preferred lab:      Send INR reminders to:  JOVITA LOVE'S    Comments:        Anticoagulation Care Providers     Provider Role Specialty Phone number    Matthias Sanchez MD Referring Family Medicine [phone]

## 2021-06-20 ENCOUNTER — DOCUMENTATION ONLY (OUTPATIENT)
Dept: FAMILY MEDICINE | Facility: CLINIC | Age: 71
End: 2021-06-20

## 2021-06-21 ENCOUNTER — ANTICOAGULATION THERAPY VISIT (OUTPATIENT)
Dept: FAMILY MEDICINE | Facility: CLINIC | Age: 71
End: 2021-06-21

## 2021-06-21 ENCOUNTER — OFFICE VISIT (OUTPATIENT)
Dept: FAMILY MEDICINE | Facility: CLINIC | Age: 71
End: 2021-06-21
Payer: COMMERCIAL

## 2021-06-21 ENCOUNTER — TELEPHONE (OUTPATIENT)
Dept: FAMILY MEDICINE | Facility: CLINIC | Age: 71
End: 2021-06-21

## 2021-06-21 VITALS
SYSTOLIC BLOOD PRESSURE: 133 MMHG | HEART RATE: 72 BPM | WEIGHT: 315 LBS | BODY MASS INDEX: 54.03 KG/M2 | OXYGEN SATURATION: 97 % | TEMPERATURE: 98.2 F | DIASTOLIC BLOOD PRESSURE: 77 MMHG | RESPIRATION RATE: 16 BRPM

## 2021-06-21 DIAGNOSIS — I48.20 CHRONIC ATRIAL FIBRILLATION (H): ICD-10-CM

## 2021-06-21 DIAGNOSIS — E11.65 TYPE 2 DIABETES MELLITUS WITH HYPERGLYCEMIA, WITHOUT LONG-TERM CURRENT USE OF INSULIN (H): ICD-10-CM

## 2021-06-21 DIAGNOSIS — Z00.00 MEDICARE ANNUAL WELLNESS VISIT, INITIAL: Primary | ICD-10-CM

## 2021-06-21 DIAGNOSIS — N18.31 STAGE 3A CHRONIC KIDNEY DISEASE (H): ICD-10-CM

## 2021-06-21 DIAGNOSIS — J96.12 CHRONIC HYPERCAPNIC RESPIRATORY FAILURE (H): ICD-10-CM

## 2021-06-21 DIAGNOSIS — G63 POLYNEUROPATHY ASSOCIATED WITH UNDERLYING DISEASE (H): ICD-10-CM

## 2021-06-21 DIAGNOSIS — Z12.11 COLON CANCER SCREENING: ICD-10-CM

## 2021-06-21 DIAGNOSIS — I73.9 PAD (PERIPHERAL ARTERY DISEASE) (H): ICD-10-CM

## 2021-06-21 DIAGNOSIS — I50.32 CHRONIC HEART FAILURE WITH PRESERVED EJECTION FRACTION (H): ICD-10-CM

## 2021-06-21 DIAGNOSIS — Z53.9 ERRONEOUS ENCOUNTER--DISREGARD: Primary | ICD-10-CM

## 2021-06-21 LAB
CREAT UR-MCNC: 172 MG/DL
INR PPP: 2.9
MICROALBUMIN UR-MCNC: 20 MG/L
MICROALBUMIN/CREAT UR: 11.69 MG/G CR (ref 0–17)

## 2021-06-21 PROCEDURE — 85610 PROTHROMBIN TIME: CPT | Performed by: FAMILY MEDICINE

## 2021-06-21 PROCEDURE — 99215 OFFICE O/P EST HI 40 MIN: CPT | Mod: GY | Performed by: FAMILY MEDICINE

## 2021-06-21 PROCEDURE — 36416 COLLJ CAPILLARY BLOOD SPEC: CPT | Performed by: FAMILY MEDICINE

## 2021-06-21 PROCEDURE — 82043 UR ALBUMIN QUANTITATIVE: CPT | Performed by: FAMILY MEDICINE

## 2021-06-21 NOTE — PROGRESS NOTES
Anticoagulation Management    Unable to reach Clarke today.    Today's INR result of 2.9 is therapeutic (goal INR of 2.0-3.0).  Result received from: Clinic Lab    Follow up required to confirm warfarin dose taken and assess for changes    Left message to continue current dose of warfarin 10 mg tonight.      Anticoagulation clinic to follow up    Sree Meehan RN

## 2021-06-21 NOTE — TELEPHONE ENCOUNTER
Social Work Phone Call    2021   1:56 PM    Data/Intervention:  Patient Name:  Clarke Moreira  /Age:  1950 (71 year old)    Left Voicemail For: Clarke    Reason for Call:  Patient Relations/ homecare    Assessment/ Plan:  MERVAT LVM for Clarke with the following info.     [facility] Patient Relations   -[phone]    [facility] Patient Relations    - [phone]    [facility] for nursing help   - [phone]    SW also left their direct phone number for Clarke o return call.     YAAKOV Gonzalez  Social Work Care Coordinator

## 2021-06-21 NOTE — PROGRESS NOTES
"Social Work Follow-Up      Data/Intervention:  Patient Name:  Clarke Moreira  /Age:  1950 (71 year old)    Reason for Follow-Up:  Homecare/ Medical Bills    Assessment/Plan:  Homecare: MERVAT met briefly with Clarke regarding homecare help. Now that wound care has stopped coming he has been unable to keep up with the treatment that his feet and legs require. He is looking for a volunteer that can help him.    Medical Bills: He also discussed that he felt pressure by his cardiologist to go to the ER by ambulance and now he is unsure about the medical bills and if he should be paying them or not. He said he talked with billing who said they were correct but would like to contact a customer relations person at [facility]. MERVAT offered to help find him a number and will try to call [facility] on his behalf as well.     MERVAT also briefly asked if he had ever felt her received poor healthcare at hospitals in the past and clarke stated, \"no, quite the opposite. [facility] has always treated me with respect and never pressured me into making decisions I did not want to.\"    Charis Alvarado, [facility]  Social Work Care Coordinator     "

## 2021-06-21 NOTE — PATIENT INSTRUCTIONS
Scheduling:  If you had an XRay/CT/Ultrasound/MRI ordered the number is [phone] to schedule or change your radiology appointment.   [facility] [employer] PERSONAL PREVENTIVE SERVICES PLAN - SERVICES  For Men          Review these tests with your doctor then decide which ones you want and take this page home for your reference          Immunization History   Administered Date(s) Administered     Influenza (High Dose) 3 valent vaccine 10/30/2019     Influenza (IIV3) PF 12/15/2005, 03/08/2007, 01/25/2008, 10/01/2010, 12/02/2011, 09/06/2017     Influenza, Quad, High Dose, Pf, 65yr + 11/09/2020     Mantoux Tuberculin Skin Test 10/09/2017, 03/23/2019     Pneumo Conj 13-V (2010&after) 11/27/2017     Pneumococcal 23 valent 03/24/2019     TDAP Vaccine (Boostrix) 11/27/2017     Tdap (Adacel,Boostrix) 03/08/2007                                                                                             IMMUNIZATIONS Description Recommend today?      Influenza (flu shot) Helps to prevent flu; you should get it every year Out of season   PCV 13 Prevents pneumonia; given once No; is up to date.  2017   PPSV 23 Prevents pneumonia; given once No; is up to date.  2019   Tetanus Prevents tetanus; need every 10 years No; is up to date.  2017   Herpes Zoster (shingles) Prevents or lessens symptoms from shingles; given once.  If you want this vaccine please go to a pharmacy to receive Shinrix   Hepatitis B  If you have any of the following risk factors you should be immunized for hepatitis B: severe kidney disease, people who live in the same house as a carrier of Hepatitis B virus, people who live in  institutions (e.g. nursing homes or group homes), homosexual men, patients with hemophilia who received Factor VIII or IX concentrates, abusers of illicit injectable drugs HBsAg- neg  HBsAb - neg  Candidate for immunization  Discuss risk           Health Maintenance   Topic Date Due     HF ACTION PLAN  Never done     DEPRESSION ACTION  PLAN  Never done     ZOSTER IMMUNIZATION (1 of 2) Never done     EYE EXAM  06/12/2019     COLORECTAL CANCER SCREENING  09/09/2019     DIABETIC FOOT EXAM  05/16/2020     FALL RISK ASSESSMENT  09/03/2020     COVID-19 Vaccine (2 - Moderna 2-dose series) 05/31/2021     PHQ-9  10/29/2021 (Originally 2/9/2021)     A1C  08/06/2021     BMP  11/06/2021     MEDICARE ANNUAL WELLNESS VISIT  11/09/2021     MICROALBUMIN  11/09/2021     ALT  05/06/2022     LIPID  05/06/2022     CBC  05/06/2022     ADVANCE CARE PLANNING  11/09/2025     DTAP/TDAP/TD IMMUNIZATION (3 - Td) 11/27/2027     TSH W/FREE T4 REFLEX  Completed     HEPATITIS C SCREENING  Completed     INFLUENZA VACCINE  Completed     Pneumococcal Vaccine: 65+ Years  Completed     AORTIC ANEURYSM SCREENING (SYSTEM ASSIGNED)  Completed     IPV IMMUNIZATION  Aged Out     MENINGITIS IMMUNIZATION  Aged Out            SCREENING TESTS       Description    Year of Last Screening    Recommended Today?   Heart disease screening blood tests    Cholesterol level Reducing cholesterol can reduce your risk of heart attacks by 25%.  Screening is recommended yearly if you are at risk of heart disease otherwise every 4-5 years   No; is up to date.      Diabetes screening tests    Hemoglobin A1c blood test    Finding and treating diabetes early can reduce complications.  Screening recommended/covered yearly if you have high blood pressure, high cholesterol, obesity (BMI >30), or a history of high blood glucose tests; or 2 of the following: family history of diabetes, overweight (BMI >25 but <30), age 65 years or older, and a history of diabetes of pregnancy or gave birth to baby weighing more than 9 lbs.      No; is up to date.   Hepatitis B screening Finding hepatitis B early can reduce complications.  Screening is recommended for persons with selected risk factors.   No; is up to date.   Hepatitis C screening Finding hepatitis C early can reduce complications.  Screening is recommended for all  persons born from 1945 through 1965 and for those with selected other risk factors.    No; is up to date.   HIV screening Finding HIV early can reduce complications.  Screening is recommended for persons with risk factors for HIV infection.   No; is up to date.   Glaucoma screening Early detection of glaucoma can prevent blindness.   Please talk to your eye doctor about this.         SCREENING TESTS       Description    Year of Last Screening    Recommended Today?   Colorectal cancer screening    Fecal occult blood test     Screening colonoscopy Screening for colon cancer has been shown to reduce death from colon cancer by 25-30%. Screening recommended to start at 50 years and continuing until age 75 years.     recommended   Screening for Lung Cancer     Low-dose CT scanning Screening can reduce mortality in persons aged 55-80 who have smoked at least 30 pack-years and who are either still smoking or have quit in the past 15 years.   No: is not indicated today.  Never smoker   Abdominal Aortic Aneurysm (AAA) screening    Ultrasound (US)    An aneurysm treated before rupture is very safe -a ruptured aneurysm can be fatal.  Screening  by US for AAA is limited to patients who meet one of the following criteria:    Men who are 65-75 years old and have smoked more than 100 cigarettes in their lifetime    Anyone with a family history of abdominal aortic aneurysm   No: is not indicated today.  Never Smoker      MEDICARE WELLNESS EXAM PATIENT INSTRUCTIONS     Yearly exam:     See your health care provider every year in order to review changes in your health, review medicines that you take, and discuss preventive care needs such as immunizations and cancer screening.    Get a flu shot each year.      Advance Directive:    If you have not done so, you are encouraged to complete an advance directive. If you would like support with this, please contact the clinic  through the main clinic line. More information  about advance directives can be found at:     [email or web address]     Nutrition:     Eat at least 5 servings of fruits and vegetables each day.     Eat whole-grain bread, whole-wheat pasta and brown rice instead of white grains and rice.     Talk to your doctor about Calcium and Vitamin D.      Lifestyle:    Exercise for at least 150 minutes a week (30 minutes a day, 5 days a week). This will help you control your weight and prevent disease.     Limit alcohol to one drink per day.     If you smoke, try to quit - your doctor will be happy to help.     Wear sunscreen to prevent skin cancer.     See your dentist every six months for an exam and cleaning.     See your eye doctor every 1 to 2 years to screen for conditions such as glaucoma, macular degeneration and cataracts.

## 2021-06-21 NOTE — PROGRESS NOTES
>65 year old Wellness Visit ( Medicare etc)         HPI       This 71 year old male presents as an established patient  Matthias Sanchez who presents for a  Annual Wellness Exam.    Patient has a metro mobility ride he must catch  This limited the visit    Other issues patient wants to address today:    yes, having a person come out and help dressing legs  --> SW    1. Heart failure  Reviewed notes from [facility]  Has appt arranged    2. DM  A1c=6.2 (previous 5.9)      3. Toe  Improved with Dr Lagos      4. BiPAP  Working well      5.   Bill for ambulance ride  DM Home hygiene care - legs      6. Shingles shot  Estimate of cost at pharmacy  He will check with them    7.  [facility]  Colon cancer screening (FIT)  Needs evidence of doing these. Orders written. Cards completed      8. Lump on neck  Feels lump on R occiput  Exam is normal  Reassure          Visual Acuity:  Patient Active Problem List   Diagnosis     Atrial fibrillation (H)     Hypothyroidism     Basal cell carcinoma of skin of face     CTS (carpal tunnel syndrome)     Myopia     Plantar fasciitis     Presbyopia     Regular astigmatism     Neoplasm of uncertain behavior of skin     Venous stasis     Type 2 diabetes mellitus with hyperglycemia, without long-term current use of insulin (H)     Morbid obesity (H)     Depression with anxiety     Hyperlipidemia LDL goal <100     Fall     BiPAP (biphasic positive airway pressure) dependence     Lymphedema     Chronic systolic congestive heart failure (H)     Essential hypertension with goal blood pressure less than 140/90     Urinary retention     Constipation, unspecified constipation type     Cellulitis     Onychomycosis     (HFpEF) heart failure with preserved ejection fraction (H)     Chronic hypercapnic respiratory failure (H)     Hypoventilation associated with obesity syndrome (H)     Pre-syncope     Elevated serum creatinine     PAD (peripheral artery disease) (H)     THONG (obstructive  sleep apnea)     Hypotension     Adequate nutrition     Osteomyelitis of right foot, unspecified type (H)     Advanced directives, counseling/discussion     Severe major depression (H)     Polyneuropathy associated with underlying disease (H)     Chronic atrial fibrillation (H)       Past Medical History:   Diagnosis Date     Atrial fibrillation (H)      Basal cell carcinoma      Chronic anticoagulation      Diastolic heart failure (H)      Dyslipidemia      Essential hypertension      Morbid obesity (H)      Sleep-disordered breathing      Type 2 diabetes mellitus (H)         Family History   Problem Relation Age of Onset     Depression Mother      Glaucoma Maternal Grandfather      Cancer No family hx of         No family history of skin cancer     Macular Degeneration No family hx of          Problem List, Family History and past Medical History reviewed and unchanged/updated.       Health risk Assessment/ Review of Systems:         Constitutional:   Fevers or night sweats?  NO      Eyes:   Vision problems?  yes          Hearing:  Do you feel you have hearing loss?  NO  Cardiovascular:  Chest pain, palpitations, or pain with walking? yes    Respiratory:   Breathing problems or cough?  NO    :   Difficulty controlling urination?  yes   Musculoskeletal:   Stiffness or sore joints? yes         Skin:   concerning lesions or moles? yes          Nervous System:   loss of strength or sensation,  numbness or tingling,  tremor,  dizziness,  headache?  yes  Mental Health:   depression or anxiety,  sleep problems? yes  Cognition:  Memory problems?  yes    Weight Loss: Have you lost 10 or more pounds unintentionally in the previous year?  NO  Energy: How much of the time during the past 3 weeks did you feel tired?   (check one of the following):     ? Most of the time     PHQ-2 Score:   PHQ-2 ( 1999 Pf[employer]) 11/9/2020 9/3/2019   Q1: Little interest or pleasure in doing things 3 3   Q2: Feeling down, depressed or hopeless 3  3   PHQ-2 Score 6 6       PHQ-9 Score:   [facility] Follow-up to PHQ 9/3/2019 1/6/2020 11/9/2020   PHQ-9 9. Suicide Ideation past 2 weeks Nearly every day Several days More than half the days   Thoughts of suicide or self harm in past 2 weeks - No -   PHQ-9 Safety concerns? - No -     Medical Care:     What other specialists or organizations are involved in your medical care?    Patient Care Team       Relationship Specialty Notifications Start End    Matthias Sanchez MD PCP - General Family Practice  8/30/13     Phone: [phone] Fax: [phone]         2020 [address] [place] [address]    Jada Mckeon APRN CNP Nurse Practitioner Cardiology Admissions 1/16/18     [facility]    Phone: [phone] Pager: [phone] Fax: [phone]        [address] [place] [address]    Marta Heath RN Nurse Coordinator Cardiology Admissions 1/16/18     [facility]    Phone: [phone] Fax: [phone]        Sybil Norman APRN CNP Nurse Practitioner Cardiology Admissions 6/13/18     Phone: [phone] Fax: [phone]         [address] [place] [address]    Lety Buenrostro PsyD Psychologist PSYCHOLOGIST CLINICAL  8/23/18     Phone: [phone] Fax: [phone]         [address] [place] [address]    Manas Argueta RN Specialty Care Coordinator Cardiology  2/27/19     Phone: [phone] Pager: [phone]        Jennifer Lou PANakulC Physician Assistant Physician Assistant - Medical  5/13/19     Phone: [phone] Fax: [phone]         [facility] [address]    Jesús Lagos DPM MD Podiatrist Primary Podiatric Medicine  9/30/19     Phone: [phone] Fax: [phone]         [facility] [address]    Ida Reese, RN Specialty Care Coordinator Cardiology Admissions 12/23/19     Marychuy Lopez PA-C Physician Assistant Cardiology  2/26/20     Phone: [phone] Fax: [phone]         [facility]  [facility] [address]    Stevo Mathew OD MD Optometry  6/30/20     Phone: [phone] Fax: [phone]         [facility] [address]    Jesús Lagos DPM Assigned Musculoskeletal Provider   10/23/20     Phone: [phone] Fax: [phone]         9 [facility] [address]    Christian Willoughby MD Assigned Heart and Vascular Provider   10/23/20     Phone: [phone] Fax: [phone]         [address] [place] [address] [address] [place] [address]    Stevo Mathew OD MD Optometry  11/9/20     Phone: [phone] Fax: [phone]         [address] [facility] [address] [address]    Matthias Sanchez MD Assigned PCP   12/6/20     Phone: [phone] Fax: [phone]         [address] [place] [address]    Christian Willoughby MD MD Cardiovascular Disease  3/22/21     Phone: [phone] Fax: [phone]         [address] [place] [address] [address] [place] [address]    Mahi Kirkpatrick MD Assigned Infectious Disease Provider   4/4/21     Phone: [phone] Fax: [phone]         [address] [place] [address] [address] [facility] [address]          Do you have an advanced directive? Yes      Social History / Home Safety     Social History     Tobacco Use     Smoking status: Never Smoker     Smokeless tobacco: Never Used   Substance Use Topics     Alcohol use: No     Comment: Former alcohol abuse. Quit 70s then quit later in 80s-present     Marital Status:Single  Who lives in your household? alone        Functional Status         Risk Behaviors and Healthy Habits     History   Smoking Status     Never Smoker   Smokeless Tobacco     Never Used       History   Smoking Status     Never Smoker   Smokeless Tobacco     Never Used           Functional Ability     Fall risk:   Has walker      Evaulation of Cognitive Function         1) Repeat 3 items (Leader, Season, Table)    2) Clock draw: NORMAL  3) 3 item recall: Recalls 3  objects  Results: 3 items recalled: COGNITIVE IMPAIRMENT LESS LIKELY    Mini-CogTM Copyright S Leroy. Licensed by the author for use in [facility]; reprinted with permission ([email or web address]@.[email or web address]). All rights reserved.            Other Assessments:     CV Risk based on Pooled Cohort Risk (consider assessing every 4-6 years; consider statin in patients with 10-yr risk > 7.5%):   The ASCVD Risk score (Noradiogo CAPUTO Jr., et al., 2013) failed to calculate for the following reasons:    The valid total cholesterol range is 130 to 320 mg/dL    Advance Directives: Discussed with patient and family as appropriate.  Has patient completed advance directives? Yes, advance care planning is on file. - DNR/DNI          Immunization History   Administered Date(s) Administered     Influenza (High Dose) 3 valent vaccine 10/30/2019     Influenza (IIV3) PF 12/15/2005, 03/08/2007, 01/25/2008, 10/01/2010, 12/02/2011, 09/06/2017     Influenza, Quad, High Dose, Pf, 65yr + 11/09/2020     Mantoux Tuberculin Skin Test 10/09/2017, 03/23/2019     Pneumo Conj 13-V (2010&after) 11/27/2017     Pneumococcal 23 valent 03/24/2019     TDAP Vaccine (Boostrix) 11/27/2017     Tdap (Adacel,Boostrix) 03/08/2007     Reviewed Immunization Record Today    Physical Exam     Vitals: /77   Pulse 72   Temp 98.2  F (36.8  C) (Oral)   Resp 16   Wt (!) 175.7 kg (387 lb 6.4 oz)   SpO2 97%   BMI 54.03 kg/m    BMI= Body mass index is 54.03 kg/m .     GENERAL APPEARANCE: alert and no distress  Hearing loss screen:   Normal   DENTITION:  Good repair?  yes  RESP: lungs clear to auscultation - no rales, rhonchi or wheezes  CV: irregular rhythm, no murmur, click or rub, no peripheral edema and peripheral pulses strong  SKIN: no suspicious lesions or rashes  NEURO: Normal strength and tone  MENTAL STATUS EXAM  Appearance: appropriate  Attitude: cooperative  Behavior: normal  Eye Contact: normal  Speech: normal  Orientation: oreinted to person , place, time and  situation  Mood: good  Affect: Mood Congruent  Thought Process: clear  Has stable Chronic SI - reviewed      Assessment and Plan         Welcome to Medicare Preventive Visit / Initial Medicare Annual Wellness Exam - reviewed Preventive Services and Plan form with patient as specified in Patient Instructions.    1. Medicare annual wellness visit, initial  Fall risk - has walker  Cognitive Screen - good  Advanced Directives - DNR/DNI  Vision - OK  Hearing - OK  Feet - Toe osteomyletitis improving  Dental - fair  Screening - reviewed screening - FIT, Shingles vaccine      2. Chronic heart failure - borderline HFrEF (EF 50-55%)  Chronic atrial fibrillation (H)  - INR ([facility])    3. Type 2 diabetes mellitus with hyperglycemia, without long-term current use of insulin (H)  A1c 6.0 - diet controlled  - Albumin Random Urine Quantitative with Creat Ratio    4. PAD (peripheral artery disease) (H)  Improving Toe osteomyelitis    5. Stage 3a chronic kidney disease  GFR 52    6. Polyneuropathy associated with underlying disease (H)  DM, lymphemdema  Both improving    7. Chronic hypercapnic respiratory failure (H)  stable    8. Colon cancer screening  FIT test done  - Fecal colorectal cancer screen FITT; Future    9. Forms completed for are    10. Social issues  SW involved with help with home care for wrapping legs  SW involved for bills associated with recent ER visit for hyperkalemia concerns (K was normal)      Options for treatment and follow-up care were reviewed with the Clarke Moreira and/or guardian engaged in the decision making process and verbalized understanding of the options discussed and agreed with the final plan.    Matthias Sanchez MD    Addendum 8/13/21  Changing code to 75320.   Medicare AWV was too early and patient was asked to pay for visit  Most of the visit was problem oriented.  Jack

## 2021-06-21 NOTE — LETTER
June 23, 2021      Clarke Moreira  [address]  [address]  [facility] [address]        Dear Clarke,    It was good seeing you in clinic. Please see below for your test results.  The diabetes urine test was good!    Resulted Orders   Albumin Random Urine Quantitative with Creat Ratio   Result Value Ref Range    Creatinine Urine 172 mg/dL    Albumin Urine mg/L 20 mg/L    Albumin Urine mg/g Cr 11.69 0 - 17 mg/g Cr           Sincerely,    Matthias Sanchez MD

## 2021-06-21 NOTE — PROGRESS NOTES
Scheduling:  If you had an XRay/CT/Ultrasound/MRI ordered the number is [phone] to schedule or change your radiology appointment.   [employer] MEDICARE PERSONAL PREVENTIVE SERVICES PLAN - SERVICES  For Men   Review these tests with your doctor then decide which ones you want and take this page home for your reference     Immunization History   Administered Date(s) Administered     Influenza (High Dose) 3 valent vaccine 10/30/2019     Influenza (IIV3) PF 12/15/2005, 03/08/2007, 01/25/2008, 10/01/2010, 12/02/2011, 09/06/2017     Influenza, Quad, High Dose, Pf, 65yr + 11/09/2020     Mantoux Tuberculin Skin Test 10/09/2017, 03/23/2019     Pneumo Conj 13-V (2010&after) 11/27/2017     Pneumococcal 23 valent 03/24/2019     TDAP Vaccine (Boostrix) 11/27/2017     Tdap (Adacel,Boostrix) 03/08/2007                                                       IMMUNIZATIONS Description Recommend today?     Influenza (flu shot) Helps to prevent flu; you should get it every year Out of season   PCV 13 Prevents pneumonia; given once No; is up to date.  2017   PPSV 23 Prevents pneumonia; given once No; is up to date.  2019   Tetanus Prevents tetanus; need every 10 years No; is up to date.  2017   Herpes Zoster (shingles) Prevents or lessens symptoms from shingles; given once.  If you want this vaccine please go to a pharmacy to receive Shinrix   Hepatitis B  If you have any of the following risk factors you should be immunized for hepatitis B: severe kidney disease, people who live in the same house as a carrier of Hepatitis B virus, people who live in  institutions (e.g. nursing homes or group homes), homosexual men, patients with hemophilia who received Factor VIII or IX concentrates, abusers of illicit injectable drugs HBsAg- neg  HBsAb - neg  Candidate for immunization  Discuss risk     Health Maintenance   Topic Date Due     HF ACTION PLAN  Never done     DEPRESSION ACTION PLAN  Never done     ZOSTER IMMUNIZATION (1 of 2) Never  done     EYE EXAM  06/12/2019     COLORECTAL CANCER SCREENING  09/09/2019     DIABETIC FOOT EXAM  05/16/2020     FALL RISK ASSESSMENT  09/03/2020     COVID-19 Vaccine (2 - Moderna 2-dose series) 05/31/2021     PHQ-9  10/29/2021 (Originally 2/9/2021)     A1C  08/06/2021     BMP  11/06/2021     MEDICARE ANNUAL WELLNESS VISIT  11/09/2021     MICROALBUMIN  11/09/2021     ALT  05/06/2022     LIPID  05/06/2022     CBC  05/06/2022     ADVANCE CARE PLANNING  11/09/2025     DTAP/TDAP/TD IMMUNIZATION (3 - Td) 11/27/2027     TSH W/FREE T4 REFLEX  Completed     HEPATITIS C SCREENING  Completed     INFLUENZA VACCINE  Completed     Pneumococcal Vaccine: 65+ Years  Completed     AORTIC ANEURYSM SCREENING (SYSTEM ASSIGNED)  Completed     IPV IMMUNIZATION  Aged Out     MENINGITIS IMMUNIZATION  Aged Out         SCREENING TESTS     Description   Year of Last Screening   Recommended Today?   Heart disease screening blood tests    Cholesterol level Reducing cholesterol can reduce your risk of heart attacks by 25%.  Screening is recommended yearly if you are at risk of heart disease otherwise every 4-5 years  No; is up to date.     Diabetes screening tests    Hemoglobin A1c blood test   Finding and treating diabetes early can reduce complications.  Screening recommended/covered yearly if you have high blood pressure, high cholesterol, obesity (BMI >30), or a history of high blood glucose tests; or 2 of the following: family history of diabetes, overweight (BMI >25 but <30), age 65 years or older, and a history of diabetes of pregnancy or gave birth to baby weighing more than 9 lbs.    No; is up to date.   Hepatitis B screening Finding hepatitis B early can reduce complications.  Screening is recommended for persons with selected risk factors.  No; is up to date.   Hepatitis C screening Finding hepatitis C early can reduce complications.  Screening is recommended for all persons born from 1945 through 1965 and for those with selected  other risk factors.   No; is up to date.   HIV screening Finding HIV early can reduce complications.  Screening is recommended for persons with risk factors for HIV infection.  No; is up to date.   Glaucoma screening Early detection of glaucoma can prevent blindness.   Please talk to your eye doctor about this.       SCREENING TESTS     Description   Year of Last Screening   Recommended Today?   Colorectal cancer screening    Fecal occult blood test     Screening colonoscopy Screening for colon cancer has been shown to reduce death from colon cancer by 25-30%. Screening recommended to start at 50 years and continuing until age 75 years.    recommended   Screening for Lung Cancer     Low-dose CT scanning Screening can reduce mortality in persons aged 55-80 who have smoked at least 30 pack-years and who are either still smoking or have quit in the past 15 years.  No: is not indicated today.  Never smoker   Abdominal Aortic Aneurysm (AAA) screening    Ultrasound (US)   An aneurysm treated before rupture is very safe -a ruptured aneurysm can be fatal.  Screening  by US for AAA is limited to patients who meet one of the following criteria:    Men who are 65-75 years old and have smoked more than 100 cigarettes in their lifetime    Anyone with a family history of abdominal aortic aneurysm  No: is not indicated today.  Never Smoker     MEDICARE WELLNESS EXAM PATIENT INSTRUCTIONS    Yearly exam:     See your health care provider every year in order to review changes in your health, review medicines that you take, and discuss preventive care needs such as immunizations and cancer screening.    Get a flu shot each year.     Advance Directive:    If you have not done so, you are encouraged to complete an advance directive. If you would like support with this, please contact the clinic  through the main clinic line. More information about advance directives can be found at:      [email or web address]    Nutrition:     Eat at least 5 servings of fruits and vegetables each day.     Eat whole-grain bread, whole-wheat pasta and brown rice instead of white grains and rice.     Talk to your doctor about Calcium and Vitamin D.     Lifestyle:    Exercise for at least 150 minutes a week (30 minutes a day, 5 days a week). This will help you control your weight and prevent disease.     Limit alcohol to one drink per day.     If you smoke, try to quit - your doctor will be happy to help.     Wear sunscreen to prevent skin cancer.     See your dentist every six months for an exam and cleaning.     See your eye doctor every 1 to 2 years to screen for conditions such as glaucoma, macular degeneration and cataracts.

## 2021-06-22 NOTE — PROGRESS NOTES
ANTICOAGULATION MANAGEMENT     Clarke Moreira 71 year old male is on warfarin with therapeutic INR result. (Goal INR 2.0-3.0)    Recent labs: (last 7 days)     06/21/21  1158   INR 2.9       ASSESSMENT     Source(s): Patient/Caregiver Call       Warfarin doses taken: Warfarin taken as instructed    Diet: Increased greens/vitamin K in diet; ongoing change    New illness, injury, or hospitalization: No    Medication/supplement changes: None noted    Signs or symptoms of bleeding or clotting: Yes: has a few scabs on back that bleed occassionally.    Previous INR: Subtherapeutic    Additional findings: None     PLAN     Recommended plan for ongoing change(s) affecting INR     Dosing Instructions: Continue your current warfarin dose with next INR in 3 weeks       Summary  As of 6/21/2021    Full warfarin instructions:  10 mg every day   Next INR check:  7/12/2021             Telephone call with Clarke whom verbalizes understanding and agrees to plan    Lab visit scheduled    Education provided: Target INR goal and significance of current INR result    Plan made per [facility] anticoagulation protocol    Jt Solis RN  Anticoagulation Clinic  6/22/2021    _______________________________________________________________________     Anticoagulation Episode Summary     Current INR goal:  2.0-3.0   TTR:  57.8 % (2 mo)   Target end date:  Indefinite   Send INR reminders to:  [facility]    Indications    Chronic atrial fibrillation (H) [I48.20]           Comments:  Home Care         Anticoagulation Care Providers     Provider Role Specialty Phone number    Matthias Sanchez MD Referring Family Medicine [phone]

## 2021-06-22 NOTE — PROGRESS NOTES
Anticoagulation Management    Unable to reach Clarke today.    Today's INR result of 2.9 is therapeutic (goal INR of 2.0-3.0).  Result received from: Clinic Lab    Follow up required to confirm warfarin dose taken and assess for changes    Left message to continue current dose of warfarin 10 mg tonight.      Anticoagulation clinic to follow up    Jt Solis RN

## 2021-06-22 NOTE — PROGRESS NOTES
"Patient left vm today at 418 pm regarding yesterday's INR level.   \"Tag you're it.\" Please callback when available. TONIE ToddN, RN    "

## 2021-06-23 PROBLEM — N18.30 CHRONIC KIDNEY DISEASE, STAGE 3 (H): Status: ACTIVE | Noted: 2021-06-23

## 2021-06-24 ENCOUNTER — VIRTUAL VISIT (OUTPATIENT)
Dept: PSYCHOLOGY | Facility: CLINIC | Age: 71
End: 2021-06-24
Payer: COMMERCIAL

## 2021-06-24 DIAGNOSIS — F41.1 GAD (GENERALIZED ANXIETY DISORDER): ICD-10-CM

## 2021-06-24 DIAGNOSIS — Z12.11 COLON CANCER SCREENING: ICD-10-CM

## 2021-06-24 DIAGNOSIS — F33.1 MODERATE EPISODE OF RECURRENT MAJOR DEPRESSIVE DISORDER (H): Primary | ICD-10-CM

## 2021-06-24 PROCEDURE — 90834 PSYTX W PT 45 MINUTES: CPT | Mod: 95 | Performed by: PSYCHOLOGIST

## 2021-06-24 PROCEDURE — 82274 ASSAY TEST FOR BLOOD FECAL: CPT | Performed by: FAMILY MEDICINE

## 2021-06-24 NOTE — PROGRESS NOTES
"Behavioral Health Progress Note    Client Legal Name: Clarke Moreira   Client Preferred Name: Clarke   Service Type: Individual  Length of Visit:  10:02 - 10:45 (43 minutes)  Attendees: patient     The following statement was read to the patient at the outset of this visit:     \"We have found that certain health care needs can be provided without the need for a face to face visit.  This service lets us provide the care you need with a phone conversation. I will have full access to your [facility] medical record during this entire phone call.   I will be taking notes for your medical record.  Since this is like an office visit, we will bill your insurance company for this service. There are potential benefits and risks of telephone visits (e.g. limits to patient confidentiality) that differ from in-person visits.?  Confidentiality still applies for telephone services, and nobody will record the visit.  It is important to be in a quiet, private space that is free of distractions (including cell phone or other devices) during the visit.??If during the course of the call I believe a telephone visit is not appropriate, you will not be charged for this service.\"     Consent has been obtained for this service by care team member: Yes     Emergency contact: Janice Kady [phone]  Closest Emergency Room: [place]  Location at time of call: home      Identifying Information and Presenting Problem:    The patient is a 71 year old American male who I have seen off and on for therapy over the past couple of years.  Clarke did have difficulties at times coming into clinic for appointments as the anxiety would often lead him to cancel appointments.  Since the pandemic started and we have been doing phone visits, Clarke reports feeling much more comfortable in all of his appointments when they are done via telephone than when he is in person.  In person appointments with all providers have always caused " "significant distress due to his desire to be perceived in a particular way.  On the telephone, he feels as if he can better articulate his experience and what he is thinking as he feels less need to project a particular image to those that are caring for him.  More recently, Clarke has developed increased pain and discomfort with his toes, but his anxiety about going in for treatment was keeping him from needed appointments.  We have been focused on increasing skills to cope with anxiety so that he can stay appropriately engaged in needed healthcare.     Treatment Objective(s) Addressed in This Session:               Clarke will practice a healthy daily discipline of diet and exercise.    Clarke will learn to manage anxiety so he can make and keep appointments   Progress on / Status of Treatment Objective(s) / Homework  \"I'm really struggling with my inability to care for myself.\"    PHQ-9 SCORE 9/3/2019 1/6/2020 11/9/2020   PHQ-9 Total Score - - -   PHQ-9 Total Score 25 21 22       CHRIS-7 SCORE 12/11/2018 5/10/2019 9/3/2019   Total Score - - -   Total Score 20 13 19     Topics Discussed/Interventions Provided:  Clarke reports many unhelpful and negative thoughts about himself and his perceived inability to care for himself adequately throughout his life which has left him with the health conditions that he now has.  These thoughts are often present but become exacerbated at times of transition of care - he was discharged from home care last Wednesday and this seems to be the precipitant.  Despite these thoughts and the stuckness he describes feeling, he continues to take steps improving his situation.  He reached out to the [facility] seniors program and he now has a person that is going to the grocery store to get his groceries. They are also looking into some home nursing options to help him.  He states he has not changed his bandages for 8 days - so much anxiety about it.  Did it today at 3 am " and it took him until 5:30 am to complete.  Noticed there are 3 more eruptions on his feet.  This is also confirming that he can't care for himself.      Recognizes that his frustration with himself leads him to get angry at care providers and the healthcare system in general.  Received a bill that seems inappropriate - encouraged him to talk to the patient advocate to discuss the situation further. He agreed to call after our visit today.      Provided empathic listening for the struggles he is experiencing.  Discussed strategies, such as observing the emotions/thoughts that come up rather than trying to get rid of them right away, that can help with these big emotions when they occur to help him respond so he can advocate for himself, rather than react.      Assessment: The patient was engaged in the conversation today via phone.      Mental Status: Clarke sounded alert and oriented.  Speech was typical rate and rhythm for Clarke.  Mood was frustrated and disappointed in himself.  Affect was down, but appropriate to stated mood and presentation.  Thought processes were logical and coherent.  Thought content continues to include unhelpful and negative thoughts about himself.  No evidence of psychotic or paranoid features.  Reports ongoing passive suicidal thoughts, but denies any plan or any intent to do anything to harm himself.  Memory appeared grossly intact. Insight and judgment appeared good and patient exhibited good impulse control during the appointment.     Does the patient appear to be at imminent risk of harm to self/others at this time? No    The session was necessary to increase Clarke's ability to cope with big emotions when they occur, particularly as they relate to his experiences in the healthcare system.  Symptoms of depression and anxiety have continued to interfere with his ability to function at home and cause distress that interferes with his ability to care for himself.  For example, he  was so anxious about changing his bandages on his feet that he didn't do it for 8 days which may have led to worsening of his feet.  Ongoing psychotherapy is necessary to provide counseling and provide support.    Diagnosis (DSM-5):  Major Depression, recurrent, moderate  Generalized Anxiety Disorder  R/o persistent depressive disorder    Plan:  - Appt with clarke on 7/8  - Need to update diagnostic and treatment plan  - encouraged him to keep podiatry appt on 7/12 or make appt to see Dr. Sanchez if he thought more urgent attention was needed.  - continue to work with the senior program to learn about programs and resources they have available to help Clarke live a better quality of life in his home  - call the patient advocate to talk to them about bill he received.       NOTE: Treatment plan update needed on 3/10/21.  Diagnostic assessment update due 10/15/19.

## 2021-06-25 LAB — HEMOCCULT STL QL IA: POSITIVE

## 2021-06-28 DIAGNOSIS — I50.32 CHRONIC HEART FAILURE WITH PRESERVED EJECTION FRACTION (H): ICD-10-CM

## 2021-06-29 RX ORDER — POTASSIUM CHLORIDE 1500 MG/1
40 TABLET, EXTENDED RELEASE ORAL 2 TIMES DAILY
Qty: 120 TABLET | Refills: 11 | Status: SHIPPED | OUTPATIENT
Start: 2021-06-29 | End: 2022-05-09

## 2021-07-03 DIAGNOSIS — I50.30 HEART FAILURE WITH PRESERVED EJECTION FRACTION, UNSPECIFIED HF CHRONICITY (H): ICD-10-CM

## 2021-07-07 RX ORDER — SPIRONOLACTONE 25 MG/1
50 TABLET ORAL DAILY
Qty: 180 TABLET | Refills: 0 | OUTPATIENT
Start: 2021-07-07

## 2021-07-08 ENCOUNTER — VIRTUAL VISIT (OUTPATIENT)
Dept: PSYCHOLOGY | Facility: CLINIC | Age: 71
End: 2021-07-08
Payer: COMMERCIAL

## 2021-07-08 DIAGNOSIS — F33.1 MODERATE EPISODE OF RECURRENT MAJOR DEPRESSIVE DISORDER (H): Primary | ICD-10-CM

## 2021-07-08 DIAGNOSIS — F41.1 GAD (GENERALIZED ANXIETY DISORDER): ICD-10-CM

## 2021-07-08 PROCEDURE — 90834 PSYTX W PT 45 MINUTES: CPT | Mod: 95 | Performed by: PSYCHOLOGIST

## 2021-07-08 NOTE — PROGRESS NOTES
"Behavioral Health Progress Note    Client Legal Name: Clarke Moreira   Client Preferred Name: Clarke   Service Type: Individual  Length of Visit:  8:20 - 9:05 (45 minutes)  Attendees: patient     The following statement was read to the patient at the outset of this visit:     \"We have found that certain health care needs can be provided without the need for a face to face visit.  This service lets us provide the care you need with a phone conversation. I will have full access to your [facility] medical record during this entire phone call.   I will be taking notes for your medical record.  Since this is like an office visit, we will bill your insurance company for this service. There are potential benefits and risks of telephone visits (e.g. limits to patient confidentiality) that differ from in-person visits.?  Confidentiality still applies for telephone services, and nobody will record the visit.  It is important to be in a quiet, private space that is free of distractions (including cell phone or other devices) during the visit.??If during the course of the call I believe a telephone visit is not appropriate, you will not be charged for this service.\"     Consent has been obtained for this service by care team member: Yes     Emergency contact: Janice Kady [phone]  Closest Emergency Room: [place]  Location at time of call: home      Identifying Information and Presenting Problem:    The patient is a 71 year old American male who I have seen off and on for therapy over the past couple of years.  Clarke did have difficulties at times coming into clinic for appointments as the anxiety would often lead him to cancel appointments.  Since the pandemic started and we have been doing phone visits, Clarke reports feeling much more comfortable in all of his appointments when they are done via telephone than when he is in person.  In person appointments with all providers have always caused significant " "distress due to his desire to be perceived in a particular way.  On the telephone, he feels as if he can better articulate his experience and what he is thinking as he feels less need to project a particular image to those that are caring for him.  More recently, Clarke has developed increased pain and discomfort with his toes, but his anxiety about going in for treatment was keeping him from needed appointments.  We have been focused on increasing skills to cope with anxiety so that he can stay appropriately engaged in needed healthcare.     Treatment Objective(s) Addressed in This Session:               Clarke will practice a healthy daily discipline of diet and exercise.    Clarke will learn to manage anxiety so he can make and keep appointments   Progress on / Status of Treatment Objective(s) / Homework  Feeling discouraged and down    PHQ-9 SCORE 9/3/2019 1/6/2020 11/9/2020   PHQ-9 Total Score - - -   PHQ-9 Total Score 25 21 22       CHRIS-7 SCORE 12/11/2018 5/10/2019 9/3/2019   Total Score - - -   Total Score 20 13 19     Topics Discussed/Interventions Provided:  Clarke describes feeling quite discouraged.  The last day that the home nurse was there he states his feet were \"pristine.\" Ten days later he started to have different eruptions - states this is further proof of his inability to care for himself.  Continues the cycle of all the negative thoughts he has about himself.  Gently challenged these thoughts and offered some possible reframing.  Empathized with the fear he is having about the possibility of amputation.  Provided encouragement for attendance at his next appt with Dr. Lagos.  Discussed reaching out earlier if he thought that was necessary or coming in to see Dr. Sanchez.  Appt is next week so decided he would wait.    Session also focused on feelings that Clarke finds himself developing toward people who are in his life to help him.  Explored feelings and thoughts in regard to the " "volunteer who is helping with his groceries.  Clarke is very fearful he will do or say something \"weird\" that will make her stop going.  Provided examples of what is he was concerned about which sounded pretty benign.   Discussed that awkward things happen within all types of relationships - one thing does not end the relationship.  Shared that he has the skills, insight, and willingness to be open to be able to address something if he does \"say something weird\" (such as asking her a question about her life/background).      Assessment: The patient was engaged in the conversation today via phone.      Mental Status: Clarke sounded alert and oriented.  Speech was normal rate and rhythm for Clarke. Typically Clarke is very talkative throughout the visit and today there were a few periods of silence where he seemed contemplative.  Mood was depressed and disappointed in himself.  Affect was down, but appropriate to stated mood and presentation.  Thought processes were logical and coherent.  Thought content continues to include judgmental and negative thoughts about himself.  No evidence of psychotic or paranoid features.  Reports ongoing passive suicidal thoughts, but denies any plan or any intent to do anything to harm himself.  Memory appeared grossly intact. Insight and judgment appeared good and patient exhibited good impulse control during the appointment.     Does the patient appear to be at imminent risk of harm to self/others at this time? No    The session was necessary to increase Clarke's ability to challenge and reframe negative thoughts when they occur.  Symptoms of depression and anxiety have continued to interfere with his ability to function at home and cause distress that interferes with his ability to care for himself.  For example, anxiety makes it very difficult for him to engage in the care that he needs to do for his feet to keep them from getting infected.  Ongoing psychotherapy is necessary " to provide counseling and provide support.    Diagnosis (DSM-5):  Major Depression, recurrent, moderate  Generalized Anxiety Disorder  R/o persistent depressive disorder    Plan:  - Appt with luis on 7/28 at 9 am  - Need to update diagnostic and treatment plan  - encouraged him to keep podiatry appt on 7/12 or make appt to see Dr. Sanchez if he thought more urgent attention was needed.       NOTE: Treatment plan update needed on 3/10/21.  Diagnostic assessment update due 10/15/19.

## 2021-07-09 DIAGNOSIS — I48.20 CHRONIC ATRIAL FIBRILLATION (H): Primary | ICD-10-CM

## 2021-07-12 ENCOUNTER — OFFICE VISIT (OUTPATIENT)
Dept: ORTHOPEDICS | Facility: CLINIC | Age: 71
End: 2021-07-12
Payer: COMMERCIAL

## 2021-07-12 ENCOUNTER — LAB (OUTPATIENT)
Dept: LAB | Facility: CLINIC | Age: 71
End: 2021-07-12
Payer: COMMERCIAL

## 2021-07-12 DIAGNOSIS — E11.49 TYPE II OR UNSPECIFIED TYPE DIABETES MELLITUS WITH NEUROLOGICAL MANIFESTATIONS, NOT STATED AS UNCONTROLLED(250.60) (H): Primary | ICD-10-CM

## 2021-07-12 DIAGNOSIS — B35.1 OM (ONYCHOMYCOSIS): ICD-10-CM

## 2021-07-12 DIAGNOSIS — I48.20 CHRONIC ATRIAL FIBRILLATION (H): ICD-10-CM

## 2021-07-12 LAB — INR PPP: 2.11 (ref 0.85–1.15)

## 2021-07-12 PROCEDURE — 99213 OFFICE O/P EST LOW 20 MIN: CPT | Performed by: PODIATRIST

## 2021-07-12 PROCEDURE — 85610 PROTHROMBIN TIME: CPT | Performed by: PATHOLOGY

## 2021-07-12 PROCEDURE — 36415 COLL VENOUS BLD VENIPUNCTURE: CPT | Performed by: PATHOLOGY

## 2021-07-12 NOTE — LETTER
7/12/2021         RE: Clarke Moreira  [address]  [address]  [place] [address]        Dear Colleague,    Thank you for referring your patient, Clarke Moreira, to the [facility] [place]. Please see a copy of my visit note below.    Chief Complaint   Patient presents with     Follow Up     diabetic foot exam                Allergies   Allergen Reactions     Seasonal Allergies          Subjective: Clarke is a 69 year old male who presents to the clinic today for a diabetic foot ulcer. Home nursing last came about a month ago. He relates that he has been doing well. He has no new open lesions.     Objective  Hemoglobin A1C   Date Value Ref Range Status   05/06/2021 6.2 (H) 0 - 5.6 % Final     Comment:     Normal <5.7% Prediabetes 5.7-6.4%  Diabetes 6.5% or higher - adopted from ADA   consensus guidelines.     11/09/2020 5.9 (H) 4.1 - 5.7 % Final   07/09/2020 5.9 (H) 0 - 5.6 % Final     Comment:     Normal <5.7% Prediabetes 5.7-6.4%  Diabetes 6.5% or higher - adopted from ADA   consensus guidelines.         DP and PT pulses 1/4 BL. CRT 1 second. Absent pedal hair.  Gross and protective sensations are diminished BL.  Hammertoes to all lesser digits. Hallux malleus BL. Equinus BL. Pes planus.   Onychomycosis to toes x10.  Tyloma noted to the left 5th digit DIPJ, right dorsal hallux IPJ, and right 5th DIPJ  A diabetic wound is noted at left  dorsal hallux IPJ has healed. No s/s of acute infection noted. No drainage noted to the area. No pain with palpation.        Assessment: Clarke is a 70 YO male with DM2 and neuropathy.  Ulceration on the left dorsal hallux. Clinically, this has healed.   Onychomycosis  Tyloma.      Plan:   - Pt seen and evaluated  - Nails debrided x 10.   - Daily foot exams.   - Pt to return to clinic in 9 weeks or sooner if problems arise.       Again, thank you for allowing me to participate in the care of your patient.         Sincerely,        Jesús Lagos DPM

## 2021-07-12 NOTE — PROGRESS NOTES
Chief Complaint   Patient presents with     Follow Up     diabetic foot exam                Allergies   Allergen Reactions     Seasonal Allergies          Subjective: Clarke is a 69 year old male who presents to the clinic today for a diabetic foot ulcer. Home nursing last came about a month ago. He relates that he has been doing well. He has no new open lesions.     Objective  Hemoglobin A1C   Date Value Ref Range Status   05/06/2021 6.2 (H) 0 - 5.6 % Final     Comment:     Normal <5.7% Prediabetes 5.7-6.4%  Diabetes 6.5% or higher - adopted from ADA   consensus guidelines.     11/09/2020 5.9 (H) 4.1 - 5.7 % Final   07/09/2020 5.9 (H) 0 - 5.6 % Final     Comment:     Normal <5.7% Prediabetes 5.7-6.4%  Diabetes 6.5% or higher - adopted from ADA   consensus guidelines.         DP and PT pulses 1/4 BL. CRT 1 second. Absent pedal hair.  Gross and protective sensations are diminished BL.  Hammertoes to all lesser digits. Hallux malleus BL. Equinus BL. Pes planus.   Onychomycosis to toes x10.  Tyloma noted to the left 5th digit DIPJ, right dorsal hallux IPJ, and right 5th DIPJ  A diabetic wound is noted at left  dorsal hallux IPJ has healed. No s/s of acute infection noted. No drainage noted to the area. No pain with palpation.        Assessment: Clarke is a 68 YO male with DM2 and neuropathy.  Ulceration on the left dorsal hallux. Clinically, this has healed.   Onychomycosis  Tyloma.      Plan:   - Pt seen and evaluated  - Nails debrided x 10.   - Daily foot exams.   - Pt to return to clinic in 9 weeks or sooner if problems arise.

## 2021-07-16 ENCOUNTER — ANTICOAGULATION THERAPY VISIT (OUTPATIENT)
Dept: ANTICOAGULATION | Facility: CLINIC | Age: 71
End: 2021-07-16

## 2021-07-16 DIAGNOSIS — I48.20 CHRONIC ATRIAL FIBRILLATION (H): Primary | ICD-10-CM

## 2021-07-16 NOTE — PROGRESS NOTES
ANTICOAGULATION MANAGEMENT     Clarke Moreira 71 year old male is on warfarin with therapeutic INR result. (Goal INR 2.0-3.0)    Recent labs: (last 7 days)     07/12/21  1008   INR 2.11*       ASSESSMENT     Source(s): Patient/Caregiver Call     Apologized to patient regarding INR result delay. [facility] and labs are having issues with results routing d/t new orders and [facility] system.       Warfarin doses taken: Warfarin taken as instructed    Diet: No new diet changes identified    New illness, injury, or hospitalization: No    Medication/supplement changes: None noted    Signs or symptoms of bleeding or clotting: No    Previous INR: Therapeutic last visit; previously outside of goal range    Additional findings: None     PLAN     Recommended plan for no diet, medication or health factor changes affecting INR     Dosing Instructions: Continue your current warfarin dose with next INR in 4 weeks       Summary  As of 7/16/2021    Full warfarin instructions:  10 mg every day   Next INR check:  8/13/2021             Telephone call with Clarke who verbalizes understanding and agrees to plan    Patient offered & declined to schedule next visit    Education provided: Please call back if any changes to your diet, medications or how you've been taking warfarin, Monitoring for bleeding signs and symptoms and Monitoring for clotting signs and symptoms    Plan made per [facility] anticoagulation protocol    Shanice Blevins RN  Anticoagulation Clinic  7/16/2021    _______________________________________________________________________     Anticoagulation Episode Summary     Current INR goal:  2.0-3.0   TTR:  68.8 % (2.7 mo)   Target end date:  Indefinite   Send INR reminders to:  [facility]    Indications    Chronic atrial fibrillation (H) [I48.20]           Comments:  Home Care         Anticoagulation Care Providers     Provider Role Specialty Phone number    Matthias Sanchez MD Referring Family Medicine [phone]

## 2021-07-20 ENCOUNTER — DOCUMENTATION ONLY (OUTPATIENT)
Dept: FAMILY MEDICINE | Facility: CLINIC | Age: 71
End: 2021-07-20

## 2021-07-20 NOTE — PROGRESS NOTES
Form has been completed by provider.     Form sent out via: [phone]  Patient informed: na  Output date: July 20, 2021    Yajaira Longoria CMA      **Please close the encounter**

## 2021-07-28 ENCOUNTER — VIRTUAL VISIT (OUTPATIENT)
Dept: PSYCHOLOGY | Facility: CLINIC | Age: 71
End: 2021-07-28
Payer: COMMERCIAL

## 2021-07-28 DIAGNOSIS — F41.1 GAD (GENERALIZED ANXIETY DISORDER): ICD-10-CM

## 2021-07-28 DIAGNOSIS — F33.1 MODERATE EPISODE OF RECURRENT MAJOR DEPRESSIVE DISORDER (H): Primary | ICD-10-CM

## 2021-07-28 PROCEDURE — 90834 PSYTX W PT 45 MINUTES: CPT | Mod: 95 | Performed by: PSYCHOLOGIST

## 2021-07-28 NOTE — PROGRESS NOTES
"The following statement was read to the patient at the outset of this visit:     \"We have found that certain health care needs can be provided without the need for a face to face visit.  This service lets us provide the care you need with a phone conversation. I will have full access to your [facility] medical record during this entire phone call.   I will be taking notes for your medical record.  Since this is like an office visit, we will bill your insurance company for this service. There are potential benefits and risks of telephone visits (e.g. limits to patient confidentiality) that differ from in-person visits.?  Confidentiality still applies for telephone services, and nobody will record the visit.  It is important to be in a quiet, private space that is free of distractions (including cell phone or other devices) during the visit.??If during the course of the call I believe a telephone visit is not appropriate, you will not be charged for this service.\"     Consent has been obtained for this service by care team member: Yes     Emergency contact: Janice Hillman [phone]  [facility] Emergency Room: [place]  Location at time of call: home    Behavioral Health Progress Note    Client's Legal Name: Clarke Moreira    Client's Preferred Name: Clarke  YOB: 1950  Type of Service: telephone, counseling  Length of Service:   Start time: 9 am    End time: 9:40  Duration: 40 minutes  Attendees: patient    Identifying Information and Presenting Problem:    The patient is a 71 year old American male who I have seen off and on for therapy over the past couple of years.  Clarke did have difficulties at times coming into clinic for appointments as the anxiety would often lead him to cancel appointments.  Since the pandemic started and we have been doing phone visits, Clarke reports feeling much more comfortable in all of his appointments when they are done via telephone than when he is in " person.  In person appointments with all providers have always caused significant distress due to his desire to be perceived in a particular way.  On the telephone, he feels as if he can better articulate his experience and what he is thinking as he feels less need to project a particular image to those that are caring for him.  More recently, Clarke has developed increased pain and discomfort with his toes, but his anxiety about going in for treatment was keeping him from needed appointments.  We have been focused on increasing skills to cope with anxiety so that he can stay appropriately engaged in needed healthcare.     Treatment Objective(s) Addressed in This Session:    Clarke will practice a healthy daily discipline of diet and exercise.  Clarke will learn to manage anxiety so he can make and keep appointments     Progress on / Status of Treatment Objective(s) / Homework:  Satisfactory progress     PHQ-9:   PHQ 11/9/2020 1/6/2020 9/3/2019   PHQ-9 Total Score 22 21 25   Q9: Thoughts of better off dead/self-harm past 2 weeks More than half the days Several days Nearly every day   F/U: Thoughts of suicide or self-harm - No -   F/U: Safety concerns - No -      CHRIS-7:   CHRIS-7 SCORE 9/3/2019 5/10/2019 12/11/2018   Total Score - - -   Total Score 19 13 20     Topics Discussed/Interventions Provided:  Clarke describes ongoing depressed mood and low view of himself.  Wrestles with hopelessness and discouragement on a regular basis.  Session today was focused on information Clarke received regarding costs of receiving some different services for assistance with his health at home.  He is frustrated because he has worked hard to have a bit of savings, but because he has this, he doesn't qualify for these services through insurance.  Discussion indicated that he felt like he couldn't say yes to any services.  Facilitated discussion and processing emotions related to making this decision. Significant barrier is  Clarke feeling like he is not deserving of him spending his money on these services.  Encouraged him to focus on what would improve the quality of his life and functioning right now and use that as one of the benchmarks as he makes this decision.      Assessment:   The patient appeared to be active and engaged in today's session and was receptive to feedback.     Mental Status:   During interaction with the examiner today, Clarke was cooperative, open, engaged and pleasant. Patient was generally alert and oriented to person, place, time, and situation.   Speech was normal limits tone, rate, volume; largely coherent and relevant to topic. Mood was depressed; affect was mood-congruent. Thought processes were unremarkable. Thought content was not remarkable with no evidence of psychotic features and no evidence of suicide, homicide, or nonsuicidal self injury related thoughts, intent, urges, planning, behavior/recent attempts. Memory appeared grossly intact without being formally evaluated. Insight appeared good and judgment good.  Patient exhibited good impulse control during the appointment.     Does the patient appear to be at imminent risk of harm to self/others at this time? No    The session was necessary to address depression and low self-view that have been interfering with patient's ability to function in areas related to social relationships day to day self care or health behaviors chronic disease management household management. Ongoing psychotherapy is necessary to stabilize mood, provide counseling, improve functioning with daily activities and provide support.    DSM-5 Diagnosis:  Major Depression, recurrent, moderate  Generalized Anxiety Disorder  R/o persistent depressive disorder    Plan:  - Follow up on 8:20 8/19 by phone  - After Visit Summary was declined    Lety Buenrostro PsyD    NOTE: Treatment plan update needed on 3/10/21.  Diagnostic assessment update due 10/15/19.

## 2021-08-02 ENCOUNTER — TELEPHONE (OUTPATIENT)
Dept: FAMILY MEDICINE | Facility: CLINIC | Age: 71
End: 2021-08-02

## 2021-08-02 NOTE — TELEPHONE ENCOUNTER
[facility] phone call message- general phone call:    Reason for call: Pt called inquiring call back to discuss problem with insurance coverage for visit date  6/21/2021. Please advise  Account #: [id number]  Visit #: [id number]    Return call needed: Yes    OK to leave a message on voice mail? Yes    Primary language: English      needed? No    Call taken on August 2, 2021 at 8:20 AM by Panchito Early

## 2021-08-11 NOTE — PLAN OF CARE
Problem: Patient Care Overview  Goal: Plan of Care/Patient Progress Review  Discharge Planner PT   Patient plan for discharge: TCU       Current status: Patient well engaged and motivated throughout session. Improved foot clearance noted during transfers and ambulation with walker lowered to more appropriate height. Patient demonstrating improved functional strength and ambulation tolerance improving total walking tolerance by 2 feet this session. No LOB but 2 person assist throughout activities for safety.   Barriers to return to prior living situation: functional weakness, balance deficits, endurance deficits, decreased independence with functional mobility  Recommendations for discharge: TCU  Rationale for recommendations: Skilled PT indicated to improve independence with bed mobility, transfers, gait, functional strength and overall activity tolerance.        Entered by: Janell Brewer 10/08/2017 12:57 PM              Epidermal Closure Graft Donor Site (Optional): simple interrupted

## 2021-08-19 ENCOUNTER — VIRTUAL VISIT (OUTPATIENT)
Dept: PSYCHOLOGY | Facility: CLINIC | Age: 71
End: 2021-08-19
Payer: COMMERCIAL

## 2021-08-19 ENCOUNTER — TELEPHONE (OUTPATIENT)
Dept: FAMILY MEDICINE | Facility: CLINIC | Age: 71
End: 2021-08-19

## 2021-08-19 DIAGNOSIS — F33.1 MODERATE EPISODE OF RECURRENT MAJOR DEPRESSIVE DISORDER (H): Primary | ICD-10-CM

## 2021-08-19 DIAGNOSIS — F41.1 GAD (GENERALIZED ANXIETY DISORDER): ICD-10-CM

## 2021-08-19 PROCEDURE — 90834 PSYTX W PT 45 MINUTES: CPT | Mod: 95 | Performed by: PSYCHOLOGIST

## 2021-08-19 NOTE — PROGRESS NOTES
"The following statement was read to the patient at the outset of this visit:     \"We have found that certain health care needs can be provided without the need for a face to face visit.  This service lets us provide the care you need with a phone conversation. I will have full access to your [facility] medical record during this entire phone call.   I will be taking notes for your medical record.  Since this is like an office visit, we will bill your insurance company for this service. There are potential benefits and risks of telephone visits (e.g. limits to patient confidentiality) that differ from in-person visits.?  Confidentiality still applies for telephone services, and nobody will record the visit.  It is important to be in a quiet, private space that is free of distractions (including cell phone or other devices) during the visit.??If during the course of the call I believe a telephone visit is not appropriate, you will not be charged for this service.\"     Consent has been obtained for this service by care team member: Yes     Emergency contact: Janice Hillman [phone]  [facility] Emergency Room: [place]  Location at time of call: home    Behavioral Health Progress Note    Client's Legal Name: Clarke Moreira    Client's Preferred Name: Clarke  YOB: 1950  Type of Service: telephone, counseling  Length of Service:   Start time: 8:22 am    End time: 9:02  Duration: 40 minutes  Attendees: patient    Identifying Information and Presenting Problem:    The patient is a 71 year old American male who I have seen off and on for therapy over the past couple of years.  Clarke did have difficulties at times coming into clinic for appointments as the anxiety would often lead him to cancel appointments.  Since the pandemic started and we have been doing phone visits, Clarke reports feeling much more comfortable in all of his appointments when they are done via telephone than when he is in " person.  In person appointments with all providers have always caused significant distress due to his desire to be perceived in a particular way.  On the telephone, he feels as if he can better articulate his experience and what he is thinking as he feels less need to project a particular image to those that are caring for him.  More recently, Clarke has developed increased pain and discomfort with his toes, but his anxiety about going in for treatment was keeping him from needed appointments.  We have been focused on increasing skills to cope with anxiety so that he can stay appropriately engaged in needed healthcare.     Treatment Objective(s) Addressed in This Session:    Clarke will practice a healthy daily discipline of diet and exercise.  Clarke will learn to manage anxiety so he can make and keep appointments     Progress on / Status of Treatment Objective(s) / Homework:  Feeling more low and stuck  Satisfactory progress     PHQ-9:   PHQ 11/9/2020 1/6/2020 9/3/2019   PHQ-9 Total Score 22 21 25   Q9: Thoughts of better off dead/self-harm past 2 weeks More than half the days Several days Nearly every day   F/U: Thoughts of suicide or self-harm - No -   F/U: Safety concerns - No -      CHRIS-7:   CHRIS-7 SCORE 9/3/2019 5/10/2019 12/11/2018   Total Score - - -   Total Score 19 13 20     Topics Discussed/Interventions Provided:  Clarke reports feeling more low, hopeless, and disgusted with himself.  Struggles to take care of legs/feets.  Concerned that things are worsening again.  See this as further evidence of his inability to care for himself in an adequate way.  These thoughts can lead to even less self-care.  Wants to be changing his compression socks more regularly, but it is a very long process for him to do on his own.  Gets frustrated with it and will give up.  Explored ambivalence about this task and challenged thoughts that he has about this process.  Identified alternative coping statements he can use  when he is engaging in this activity.       Considered what we discussed last time - that he is worth it to spend his money on getting some of the in home care to help improve quality of life. States he is not ready to take this step as of yet.     Assessment:   The patient appeared to be active and engaged in today's session and was receptive to feedback.     Mental Status:   During interaction with the examiner today, Clarke was cooperative, open, engaged and pleasant. Patient was generally alert and oriented to person, place, time, and situation.   Speech was normal limits tone, rate, volume; largely coherent and relevant to topic. Mood was dysthymic; affect was blunted mood-congruent. Thought processes were unremarkable. Thought content was remarkable for hopeless and self-disparaging thoughts with no evidence of psychotic features and no evidence of suicide, homicide, or nonsuicidal self injury related thoughts, intent, urges, planning, behavior/recent attempts. Memory appeared grossly intact without being formally evaluated. Insight appeared good and judgment good.  Patient exhibited good impulse control during the appointment.     Does the patient appear to be at imminent risk of harm to self/others at this time? No    The session was necessary to address depression and low self-view that have been interfering with patient's ability to function in areas related to social relationships day to day self care or health behaviors chronic disease management household management. Ongoing psychotherapy is necessary to stabilize mood, provide counseling, improve functioning with daily activities and provide support.    DSM-5 Diagnosis:  Major Depression, recurrent, moderate  Generalized Anxiety Disorder  R/o persistent depressive disorder    Plan:  - Follow up on 9/16 and 9/30  - After Visit Summary was declined   - update diagnostic assessment    Lety Buenrostro PsyD    NOTE: Treatment plan update needed on  3/10/21.  Diagnostic assessment update due 10/15/19.

## 2021-08-30 DIAGNOSIS — L85.3 DRY SKIN: ICD-10-CM

## 2021-08-30 DIAGNOSIS — L28.0 LICHEN OF SKIN: ICD-10-CM

## 2021-08-30 DIAGNOSIS — I48.19 PERSISTENT ATRIAL FIBRILLATION (H): ICD-10-CM

## 2021-08-30 RX ORDER — WARFARIN SODIUM 5 MG/1
TABLET ORAL
Qty: 180 TABLET | Refills: 1 | Status: SHIPPED | OUTPATIENT
Start: 2021-08-30 | End: 2022-01-10

## 2021-08-30 RX ORDER — AMMONIUM LACTATE 12 G/100G
CREAM TOPICAL 2 TIMES DAILY PRN
Qty: 385 G | Refills: 11 | Status: SHIPPED | OUTPATIENT
Start: 2021-08-30 | End: 2022-05-09

## 2021-08-30 NOTE — TELEPHONE ENCOUNTER
"Request for medication refill:  warfarin ANTICOAGULANT (COUMADIN) 5 MG tablet  Providers if patient needs an appointment and you are willing to give a one month supply please refill for one month and  send a letter/MyChart using \".SMILLIMITEDREFILL\" .smillimited and route chart to \"[facility] \" (Giving one month refill in non controlled medications is strongly recommended before denial)    If refill has been denied, meaning absolutely no refills without visit, please complete the smart phrase \".smirxrefuse\" and route it to the \"[facility] MED REFILLS\"  pool to inform the patient and the pharmacy.    Rosemarie Valles        "

## 2021-09-13 NOTE — PROGRESS NOTES
Chief Complaint   Patient presents with     Follow Up     9 week follow up.             Allergies   Allergen Reactions     Seasonal Allergies          Subjective: Clarke is a 69 year old male who presents to the clinic today for a diabetic foot ulcer. He relates that he has been doing well. He has no new open lesions.     Objective  Hemoglobin A1C   Date Value Ref Range Status   05/06/2021 6.2 (H) 0 - 5.6 % Final     Comment:     Normal <5.7% Prediabetes 5.7-6.4%  Diabetes 6.5% or higher - adopted from ADA   consensus guidelines.     11/09/2020 5.9 (H) 4.1 - 5.7 % Final   07/09/2020 5.9 (H) 0 - 5.6 % Final     Comment:     Normal <5.7% Prediabetes 5.7-6.4%  Diabetes 6.5% or higher - adopted from ADA   consensus guidelines.         DP and PT pulses 1/4 BL. CRT 1 second. Absent pedal hair.  Gross and protective sensations are diminished BL.  Hammertoes to all lesser digits. Hallux malleus BL. Equinus BL. Pes planus.   Onychomycosis to toes x10.  Tyloma noted to the left 5th digit DIPJ, right dorsal hallux IPJ, and right 5th DIPJ  A diabetic wound is noted at left  dorsal hallux IPJ has healed. No s/s of acute infection noted. No drainage noted to the area. No pain with palpation.        Assessment: Clarke is a 70 YO male with DM2 and neuropathy.  Ulceration on the left dorsal hallux. Clinically, this has healed.   Onychomycosis  Tyloma.      Plan:   - Pt seen and evaluated  - Nails debrided x 10.   - Daily foot exams.   - Pt to return to clinic in 9 weeks or sooner if problems arise.

## 2021-09-15 ENCOUNTER — ANTICOAGULATION THERAPY VISIT (OUTPATIENT)
Dept: ANTICOAGULATION | Facility: CLINIC | Age: 71
End: 2021-09-15

## 2021-09-15 ENCOUNTER — OFFICE VISIT (OUTPATIENT)
Dept: ORTHOPEDICS | Facility: CLINIC | Age: 71
End: 2021-09-15
Payer: COMMERCIAL

## 2021-09-15 ENCOUNTER — LAB (OUTPATIENT)
Dept: LAB | Facility: CLINIC | Age: 71
End: 2021-09-15
Payer: COMMERCIAL

## 2021-09-15 DIAGNOSIS — I48.20 CHRONIC ATRIAL FIBRILLATION (H): ICD-10-CM

## 2021-09-15 DIAGNOSIS — I50.32 CHRONIC HEART FAILURE WITH PRESERVED EJECTION FRACTION (H): ICD-10-CM

## 2021-09-15 DIAGNOSIS — I48.20 CHRONIC ATRIAL FIBRILLATION (H): Primary | ICD-10-CM

## 2021-09-15 DIAGNOSIS — B35.1 OM (ONYCHOMYCOSIS): ICD-10-CM

## 2021-09-15 DIAGNOSIS — E11.49 TYPE II OR UNSPECIFIED TYPE DIABETES MELLITUS WITH NEUROLOGICAL MANIFESTATIONS, NOT STATED AS UNCONTROLLED(250.60) (H): Primary | ICD-10-CM

## 2021-09-15 DIAGNOSIS — E78.5 HYPERLIPIDEMIA LDL GOAL <100: ICD-10-CM

## 2021-09-15 LAB
ERYTHROCYTE [DISTWIDTH] IN BLOOD BY AUTOMATED COUNT: 14.1 % (ref 10–15)
HCT VFR BLD AUTO: 51.2 % (ref 40–53)
HGB BLD-MCNC: 16.3 G/DL (ref 13.3–17.7)
INR BLD: 2.1 (ref 0.9–1.1)
MCH RBC QN AUTO: 28.2 PG (ref 26.5–33)
MCHC RBC AUTO-ENTMCNC: 31.8 G/DL (ref 31.5–36.5)
MCV RBC AUTO: 89 FL (ref 78–100)
PLATELET # BLD AUTO: 251 10E3/UL (ref 150–450)
RBC # BLD AUTO: 5.77 10E6/UL (ref 4.4–5.9)
WBC # BLD AUTO: 10.4 10E3/UL (ref 4–11)

## 2021-09-15 PROCEDURE — 36415 COLL VENOUS BLD VENIPUNCTURE: CPT | Performed by: PATHOLOGY

## 2021-09-15 PROCEDURE — 85610 PROTHROMBIN TIME: CPT | Performed by: PATHOLOGY

## 2021-09-15 PROCEDURE — 85027 COMPLETE CBC AUTOMATED: CPT | Performed by: PATHOLOGY

## 2021-09-15 PROCEDURE — 99213 OFFICE O/P EST LOW 20 MIN: CPT | Performed by: PODIATRIST

## 2021-09-15 NOTE — LETTER
9/15/2021         RE: Clarke Moreira  [address]  [address]  [facility] [id number]        Dear Colleague,    Thank you for referring your patient, Clarke Moreira, to the [facility] [place]. Please see a copy of my visit note below.    Chief Complaint   Patient presents with     Follow Up     9 week follow up.             Allergies   Allergen Reactions     Seasonal Allergies          Subjective: Clarke is a 69 year old male who presents to the clinic today for a diabetic foot ulcer. He relates that he has been doing well. He has no new open lesions.     Objective  Hemoglobin A1C   Date Value Ref Range Status   05/06/2021 6.2 (H) 0 - 5.6 % Final     Comment:     Normal <5.7% Prediabetes 5.7-6.4%  Diabetes 6.5% or higher - adopted from ADA   consensus guidelines.     11/09/2020 5.9 (H) 4.1 - 5.7 % Final   07/09/2020 5.9 (H) 0 - 5.6 % Final     Comment:     Normal <5.7% Prediabetes 5.7-6.4%  Diabetes 6.5% or higher - adopted from ADA   consensus guidelines.         DP and PT pulses 1/4 BL. CRT 1 second. Absent pedal hair.  Gross and protective sensations are diminished BL.  Hammertoes to all lesser digits. Hallux malleus BL. Equinus BL. Pes planus.   Onychomycosis to toes x10.  Tyloma noted to the left 5th digit DIPJ, right dorsal hallux IPJ, and right 5th DIPJ  A diabetic wound is noted at left  dorsal hallux IPJ has healed. No s/s of acute infection noted. No drainage noted to the area. No pain with palpation.        Assessment: Clarke is a 68 YO male with DM2 and neuropathy.  Ulceration on the left dorsal hallux. Clinically, this has healed.   Onychomycosis  Tyloma.      Plan:   - Pt seen and evaluated  - Nails debrided x 10.   - Daily foot exams.   - Pt to return to clinic in 9 weeks or sooner if problems arise.       Again, thank you for allowing me to participate in the care of your patient.        Sincerely,        Jesús Lagos DPM

## 2021-09-15 NOTE — PROGRESS NOTES
ANTICOAGULATION MANAGEMENT     Clarke Moreira 71 year old male is on warfarin with therapeutic INR result. (Goal INR 2.0-3.0)    Recent labs: (last 7 days)     09/15/21  1005   INR 2.1*       ASSESSMENT     Source(s): Chart Review and Patient/Caregiver Call       Warfarin doses taken: Less warfarin taken than planned which may be affecting INR    Diet: No new diet changes identified    New illness, injury, or hospitalization: No    Medication/supplement changes: None noted    Signs or symptoms of bleeding or clotting: No    Previous INR: Therapeutic last 2(+) visits    Additional findings: Pt reports he has been taking 5 mg on Thursdays, 10 mg ROW since last INR check     PLAN     Recommended plan for no diet, medication or health factor changes affecting INR     Dosing Instructions: Continue your current warfarin dose as you have been taking it with next INR in 6 weeks       Summary  As of 9/15/2021    Full warfarin instructions:  5 mg every Thu; 10 mg all other days   Next INR check:  10/27/2021             Telephone call with Clarke who verbalizes understanding and agrees to plan    Patient offered & declined to schedule next visit    Education provided: Goal range and significance of current result    Plan made per [facility] anticoagulation protocol    Laurel Mcneal RN  Anticoagulation Clinic  9/15/2021    _______________________________________________________________________     Anticoagulation Episode Summary     Current INR goal:  2.0-3.0   TTR:  82.8 % (4.8 mo)   Target end date:  Indefinite   Send INR reminders to:  [facility]    Indications    Chronic atrial fibrillation (H) [I48.20]           Comments:  Home Care         Anticoagulation Care Providers     Provider Role Specialty Phone number    Matthias Sanchez MD Referring Family Medicine [phone]

## 2021-09-15 NOTE — NURSING NOTE
Reason For Visit:   Chief Complaint   Patient presents with     Follow Up     9 week follow up.        Pain Assessment  Patient Currently in Pain: Denies        Allergies   Allergen Reactions     Seasonal Allergies            Whitley Munoz LPN

## 2021-09-16 ENCOUNTER — VIRTUAL VISIT (OUTPATIENT)
Dept: PSYCHOLOGY | Facility: CLINIC | Age: 71
End: 2021-09-16
Payer: COMMERCIAL

## 2021-09-16 DIAGNOSIS — F33.1 MODERATE EPISODE OF RECURRENT MAJOR DEPRESSIVE DISORDER (H): Primary | ICD-10-CM

## 2021-09-16 DIAGNOSIS — F41.1 GAD (GENERALIZED ANXIETY DISORDER): ICD-10-CM

## 2021-09-16 PROCEDURE — 90834 PSYTX W PT 45 MINUTES: CPT | Mod: 95 | Performed by: PSYCHOLOGIST

## 2021-09-16 NOTE — PROGRESS NOTES
"The following statement was read to the patient at the outset of this visit:     \"We have found that certain health care needs can be provided without the need for a face to face visit.  This service lets us provide the care you need with a phone conversation. I will have full access to your [facility] medical record during this entire phone call.   I will be taking notes for your medical record.  Since this is like an office visit, we will bill your insurance company for this service. There are potential benefits and risks of telephone visits (e.g. limits to patient confidentiality) that differ from in-person visits.?  Confidentiality still applies for telephone services, and nobody will record the visit.  It is important to be in a quiet, private space that is free of distractions (including cell phone or other devices) during the visit.??If during the course of the call I believe a telephone visit is not appropriate, you will not be charged for this service.\"     Consent has been obtained for this service by care team member: Yes     Emergency contact: Janice Hillman [phone]  [facility] Emergency Room: [place]  Location at time of call: home    Behavioral Health Progress Note    Client's Legal Name: Clarke Moreira    Client's Preferred Name: Clarke  YOB: 1950  Type of Service: telephone, counseling  Length of Service:   Start time: 9:05   End time: 9:45  Duration:  40 minutes  Attendees: patient    Identifying Information and Presenting Problem:    The patient is a 71 year old American male who I have seen off and on for therapy over the past couple of years.  Clarke did have difficulties at times coming into clinic for appointments as the anxiety would often lead him to cancel appointments.  Since the pandemic started and we have been doing phone visits, Clarke reports feeling much more comfortable in all of his appointments when they are done via telephone than when he is in " person.  In person appointments with all providers have always caused significant distress due to his desire to be perceived in a particular way.  On the telephone, he feels as if he can better articulate his experience and what he is thinking as he feels less need to project a particular image to those that are caring for him.  More recently, Clarke has developed increased pain and discomfort with his toes, but his anxiety about going in for treatment was keeping him from needed appointments.  We have been focused on increasing skills to cope with anxiety so that he can stay appropriately engaged in needed healthcare.     Treatment Objective(s) Addressed in This Session:    Clarke will practice a healthy daily discipline of diet and exercise.  Clarke will learn to manage anxiety so he can make and keep appointments     Progress on / Status of Treatment Objective(s) / Homework:  Feeling more low and stuck  Satisfactory progress     PHQ-9:   PHQ 11/9/2020 1/6/2020 9/3/2019   PHQ-9 Total Score 22 21 25   Q9: Thoughts of better off dead/self-harm past 2 weeks More than half the days Several days Nearly every day   F/U: Thoughts of suicide or self-harm - No -   F/U: Safety concerns - No -      CHRIS-7:   CHRIS-7 SCORE 9/3/2019 5/10/2019 12/11/2018   Total Score - - -   Total Score 19 13 20     Topics Discussed/Interventions Provided:  Clarke reports feeling low, down, and discouraged.  Continues to wonder about what the purpose of his time here on earth was supposed to be and why he continues to be here.  Further assessed the thoughts he is having about suicide.      Patient Risk Assessment:  He has notable risk factors for self-harm, including lives alone/ isolated and male. However, risk is mitigated by no plan or intent and no h/o risky impulsive behavior. At this time, he does not appear to be at imminent risk for self-harm and does not meet criteria for an emergency hold, and therefore involuntary hospitalization  will not be pursued at this  time. Clarke is aware of crisis resources and was encouraged to call these local crisis numbers, 911, or visit a local emergency room if thoughts of suicide or homicide were to arise and/or if he were to be in acute distress.     Session also focused on chronic health conditions.  Clarke received good news from his doctor that his feet and ulcers had healed up and were looking good.  Explored what actions Clarke took in order to help improve his health.  Clarke was very reluctant to take any credit or responsibility for the healing that occurred.  Pointed out the effort he underwent to do change his stockings and do the things that were asked despite the difficulty level he experiences when doing this. Provided positive reinforcement for these actions.     Assessment:   The patient appeared to be active and engaged in today's session and was receptive to feedback.     Mental Status:   During interaction with the examiner today, Clarke was cooperative, open, engaged and respectful. He was generally alert and oriented to person, place, time, and situation.   Speech was normal limits tone, rate, volume; largely coherent and relevant to topic. Mood was down; affect was blunted.  Thought processes were were wnl. Thought content was remarkable for hopeless and self-disparaging thoughts with no evidence of psychotic features and please see info above re: passive suicidal thoughts. Memory appeared grossly intact without being formally evaluated. Insight appeared good and judgment good.  Patient exhibited good impulse control during the appointment.     Does the patient appear to be at imminent risk of harm to self/others at this time? No    The session was necessary to address passive suicidal thoughts, depression and low self-view that have been interfering with patient's ability to function in areas related to social relationships day to day self care or health behaviors chronic disease  management household management. Ongoing psychotherapy is necessary to stabilize mood, provide counseling, improve functioning with daily activities and provide support.    DSM-5 Diagnosis:  Major Depression, recurrent, moderate  Generalized Anxiety Disorder  R/o persistent depressive disorder    Plan:  - Follow up on 9/30  - update diagnostic assessment    Lety Buenrostro PsyD    NOTE: Treatment plan update needed on 3/10/21.  Diagnostic assessment update due 10/15/19.

## 2021-09-25 DIAGNOSIS — I50.30 HEART FAILURE WITH PRESERVED EJECTION FRACTION, UNSPECIFIED HF CHRONICITY (H): ICD-10-CM

## 2021-09-27 DIAGNOSIS — K59.00 CONSTIPATION, UNSPECIFIED CONSTIPATION TYPE: ICD-10-CM

## 2021-09-27 RX ORDER — AMOXICILLIN 250 MG
1-2 CAPSULE ORAL 2 TIMES DAILY PRN
Qty: 60 TABLET | Refills: 3 | Status: SHIPPED | OUTPATIENT
Start: 2021-09-27 | End: 2022-01-31

## 2021-09-27 NOTE — TELEPHONE ENCOUNTER
spironolactone (ALDACTONE) 25 MG tablet  Last Written Prescription Date:  3/26/21  Last Fill Quantity: 180,   # refills: 1  Last Office Visit : 3/31/21  Future Office visit:  None      Routing refill request to provider for review/approval because: wan Felder Ordered by other provider.

## 2021-09-27 NOTE — TELEPHONE ENCOUNTER
"Request for medication refill:senna-docusate (SENOKOT-S/PERICOLACE) 8.6-50 MG tablet    Providers if patient needs an appointment and you are willing to give a one month supply please refill for one month and  send a letter/MyChart using \".SMILLIMITEDREFILL\" .smillimited and route chart to \"P SMI \" (Giving one month refill in non controlled medications is strongly recommended before denial)    If refill has been denied, meaning absolutely no refills without visit, please complete the smart phrase \".smirxrefuse\" and route it to the \"P SMI MED REFILLS\"  pool to inform the patient and the pharmacy.    Anne Marie Tejada        "

## 2021-09-28 RX ORDER — SPIRONOLACTONE 25 MG/1
50 TABLET ORAL DAILY
Qty: 180 TABLET | Refills: 0 | Status: SHIPPED | OUTPATIENT
Start: 2021-09-28 | End: 2021-12-29

## 2021-09-30 ENCOUNTER — VIRTUAL VISIT (OUTPATIENT)
Dept: PSYCHOLOGY | Facility: CLINIC | Age: 71
End: 2021-09-30
Payer: COMMERCIAL

## 2021-09-30 DIAGNOSIS — F33.1 MODERATE EPISODE OF RECURRENT MAJOR DEPRESSIVE DISORDER (H): Primary | ICD-10-CM

## 2021-09-30 DIAGNOSIS — F41.1 GAD (GENERALIZED ANXIETY DISORDER): ICD-10-CM

## 2021-09-30 PROCEDURE — 90834 PSYTX W PT 45 MINUTES: CPT | Mod: 95 | Performed by: PSYCHOLOGIST

## 2021-09-30 NOTE — PROGRESS NOTES
"Dr carbone states OK to hold on 2nd set Bc at this time. Admitting MD informed him \"not going to start antibiotics right now\". OK for lab to draw upstairs.      Mitali Rivera, RN  04/03/19 0675    " "The following statement was read to the patient at the outset of this visit:     \"We have found that certain health care needs can be provided without the need for a face to face visit.  This service lets us provide the care you need with a phone conversation. I will have full access to your [facility] medical record during this entire phone call.   I will be taking notes for your medical record.  Since this is like an office visit, we will bill your insurance company for this service. There are potential benefits and risks of telephone visits (e.g. limits to patient confidentiality) that differ from in-person visits.?  Confidentiality still applies for telephone services, and nobody will record the visit.  It is important to be in a quiet, private space that is free of distractions (including cell phone or other devices) during the visit.??If during the course of the call I believe a telephone visit is not appropriate, you will not be charged for this service.\"     Consent has been obtained for this service by care team member: Yes     Emergency contact: Janice Hillman [phone]  [facility] Emergency Room: [place]  Location at time of call: home    Behavioral Health Progress Note    Client's Legal Name: Clarke Moreira    Client's Preferred Name: Clarke  YOB: 1950  Type of Service: in-person counseling  Length of Service:   Start time: 8:20  End time: 9  Duration: 40  minutes  Attendees: patient    Identifying Information and Presenting Problem:    The patient is a 71 year old American male who I have seen off and on for therapy over the past couple of years.  Clarke did have difficulties at times coming into clinic for appointments as the anxiety would often lead him to cancel appointments.  Since the pandemic started and we have been doing phone visits, Clarke reports feeling much more comfortable in all of his appointments when they are done via telephone than when he is in person.  In " "person appointments with all providers have always caused significant distress due to his desire to be perceived in a particular way.  On the telephone, he feels as if he can better articulate his experience and what he is thinking as he feels less need to project a particular image to those that are caring for him.  More recently, Clarke has developed increased pain and discomfort with his toes, but his anxiety about going in for treatment was keeping him from needed appointments.  We have been focused on increasing skills to cope with anxiety so that he can stay appropriately engaged in needed healthcare.     Treatment Objective(s) Addressed in This Session:    Clarke will practice a healthy daily discipline of diet and exercise.  Clarke will learn to manage anxiety so he can make and keep appointments     Progress on / Status of Treatment Objective(s) / Homework:  Stable, no changes reported    PHQ-9:   PHQ 11/9/2020 1/6/2020 9/3/2019   PHQ-9 Total Score 22 21 25   Q9: Thoughts of better off dead/self-harm past 2 weeks More than half the days Several days Nearly every day   F/U: Thoughts of suicide or self-harm - No -   F/U: Safety concerns - No -      CHRIS-7:   CHRIS-7 SCORE 9/3/2019 5/10/2019 12/11/2018   Total Score - - -   Total Score 19 13 20     Topics Discussed/Interventions Provided:  Clarke reports feeling overwhelmed and stuck.  Discussed situation that occurred with the woman that buys his groceries that was very upsetting for Clarke.  She called to offer to get groceries and he said \"no.\"  Said no due to the intensity of emotions and thoughts in the moment.  Then spent many days afterward ruminating about why he did this - he needs the groceries, she was willing to spend the time to go get them. Identified the problematic thoughts that were occurring. Spent time to identify the emotions he was experiencing in the moment.  Explored what might be some options if a similar level of activation occurs in " the future.  Identified some ways that Clarke can slow things down in the situation and take a pause so he can make a decision based on logic and emotion, not just emotion.      Assessment:   The patient appeared to be active and engaged in today's session and was receptive to feedback.     Mental Status:   During interaction with the examiner today, Clarke was cooperative, open, engaged and respectful. He was generally alert and oriented to person, place, time, and situation.   Speech was normal limits tone, rate, volume; largely coherent and relevant to topic. Mood was frustrated; affect was blunted.  Thought processes were were wnl. Thought content was remarkable for hopeless and self-disparaging thoughts with no evidence of psychotic features and please see info above re: passive suicidal thoughts. Did not report any thoughts of suicide this visit.  Memory appeared grossly intact without being formally evaluated. Insight appeared good and judgment good.  Patient exhibited good impulse control during the appointment.     Does the patient appear to be at imminent risk of harm to self/others at this time? No    The session was necessary to address negative thoughts and intense emotion that makes it difficult for Clarke to engage in the behaviors that he wants to do for himself.  Ongoing depressive symptoms and low self-view continue to interfere with patient's ability to function in areas related to social relationships day to day self care or health behaviors chronic disease management household management. Ongoing psychotherapy is necessary to stabilize mood, provide counseling, improve functioning with daily activities and provide support.    DSM-5 Diagnosis:  Major Depression, recurrent, moderate  Generalized Anxiety Disorder  R/o persistent depressive disorder    Plan:  - Follow up on 10/14  - update diagnostic assessment    Lety Buenrostro PsyD    NOTE: Treatment plan update needed on 3/10/21.   Diagnostic assessment update due 10/15/19.

## 2021-10-14 ENCOUNTER — VIRTUAL VISIT (OUTPATIENT)
Dept: PSYCHOLOGY | Facility: CLINIC | Age: 71
End: 2021-10-14
Payer: COMMERCIAL

## 2021-10-14 DIAGNOSIS — F33.1 MODERATE EPISODE OF RECURRENT MAJOR DEPRESSIVE DISORDER (H): Primary | ICD-10-CM

## 2021-10-14 DIAGNOSIS — F41.1 GAD (GENERALIZED ANXIETY DISORDER): ICD-10-CM

## 2021-10-14 PROCEDURE — 90834 PSYTX W PT 45 MINUTES: CPT | Mod: 95 | Performed by: PSYCHOLOGIST

## 2021-10-14 NOTE — PROGRESS NOTES
"The following statement was read to the patient at the outset of this visit:     \"We have found that certain health care needs can be provided without the need for a face to face visit.  This service lets us provide the care you need with a phone conversation. I will have full access to your [facility] medical record during this entire phone call.   I will be taking notes for your medical record.  Since this is like an office visit, we will bill your insurance company for this service. There are potential benefits and risks of telephone visits (e.g. limits to patient confidentiality) that differ from in-person visits.?  Confidentiality still applies for telephone services, and nobody will record the visit.  It is important to be in a quiet, private space that is free of distractions (including cell phone or other devices) during the visit.??If during the course of the call I believe a telephone visit is not appropriate, you will not be charged for this service.\"     Consent has been obtained for this service by care team member: Yes     Emergency contact: Janice Hillman [phone]  [facility] Emergency Room: [place]  Location at time of call: home    Behavioral Health Progress Note    Client's Legal Name: Clarke Moreira    Client's Preferred Name: Clarke  YOB: 1950  Type of Service: telephone, counseling  Length of Service:   Start time: 9:09  End time: 10:01  Duration:   52 minutes  Attendees: patient    Identifying Information and Presenting Problem:    The patient is a 71 year old American male who I have seen off and on for therapy over the past couple of years.  Clarke did have difficulties at times coming into clinic for appointments as the anxiety would often lead him to cancel appointments.  Since the pandemic started and we have been doing phone visits, Clarke reports feeling much more comfortable in all of his appointments when they are done via telephone than when he is in " person.  In person appointments with all providers have always caused significant distress due to his desire to be perceived in a particular way.  On the telephone, he feels as if he can better articulate his experience and what he is thinking as he feels less need to project a particular image to those that are caring for him.  More recently, Clarke has developed increased pain and discomfort with his toes, but his anxiety about going in for treatment was keeping him from needed appointments.  We have been focused on increasing skills to cope with anxiety so that he can stay appropriately engaged in needed healthcare.     Treatment Objective(s) Addressed in This Session:    Clarke will practice a healthy daily discipline of diet and exercise.  Clarke will learn to manage anxiety so he can make and keep appointments     Progress on / Status of Treatment Objective(s) / Homework:  Worsening    PHQ-9:   PHQ 11/9/2020 1/6/2020 9/3/2019   PHQ-9 Total Score 22 21 25   Q9: Thoughts of better off dead/self-harm past 2 weeks More than half the days Several days Nearly every day   F/U: Thoughts of suicide or self-harm - No -   F/U: Safety concerns - No -      CHRIS-7:   CHRIS-7 SCORE 9/3/2019 5/10/2019 12/11/2018   Total Score - - -   Total Score 19 13 20     Topics Discussed/Interventions Provided:  Clarke reports today that he always has suicidal thoughts, but that this past week he has been really struggling with the thoughts.  They have felt more present and intense.  He has typically found a sense of security or safety in the suicidal thoughts - as they demonstrated that he has some control in his life. This past week it felt different to him - they felt scary, there was a sense of surrender present.  He describes three things that have kept him going - watching episodes of a tv show that he really enjoys, a mpr show that he called in to and the conversation he had, he reached out to someone he knew some time ago after  seeing this person's poems highlighted and the person responded right away.      Shared that he matters to me and empathized with the level of struggle he has been experiencing.   Reports he has been feeling better the last few days.  He thought about reaching out to me, but decided he could wait until this appointment today.  Provided positive reinforcement for the actions he described taking above.  When the grocery volunteer didn't respond, he problem solved and worked out another solution to get groceries instead of giving up. Gently challenged and reframed thoughts regarding his sense of efficacy and failure in his life.  Reflected on the actions he has taken in the past couple of weeks - trying to connect with others when he typically will try to avoid interacting with others due to the level of anxiety he experiences.     Assessment:   The patient appeared to be active and engaged in today's session and was receptive to feedback.     Mental Status:   During our visit today, Clarke was cooperative, open, engaged and respectful. He was alert and oriented to person, place, time, and situation.   Speech was normal limits tone, rate, volume; largely coherent and relevant to topic.  His manner of speech is always thought out and deliberate.  Mood was depressed; affect was blunted.  Thought processes were logical and coherent. Thought content included hopelessness with more intense suicidal thoughts without a plan or intent to harm himself.  No homicidal ideation, plan, or intent.  Memory appeared grossly intact without being formally evaluated. Insight and judgment are good.  Patient exhibited good impulse control during the appointment.     Does the patient appear to be at imminent risk of harm to self/others at this time? No    The session was necessary to address increased depression and suicidal thoughts that Clarke has been experiencing.  He did describe positive coping skills that he employed to deal with  the suicidal thoughts.  They had decreased in intensity by the time of this visit.  Ongoing depressive symptoms and low self-view continue to interfere with patient's ability to function in areas related to social relationships day to day self care or health behaviors chronic disease management household management. Ongoing psychotherapy is necessary to stabilize mood, provide counseling, improve functioning with daily activities and provide support.    DSM-5 Diagnosis:  Major Depression, recurrent, moderate  Generalized Anxiety Disorder  R/o persistent depressive disorder    Plan:  - Follow up on 10/29 - discussed with patient that he can reach out if thoughts increase in frequency or intensity.  He can also go to the ED.  He stated that he understood these options.    - Will call next week to check-in  - update diagnostic assessment    Lety Buenrostro PsyD    NOTE: Treatment plan update needed on 3/10/21.  Diagnostic assessment update due 10/15/19.

## 2021-10-19 PROBLEM — F32.9 MAJOR DEPRESSION: Status: ACTIVE | Noted: 2020-11-09

## 2021-10-20 ENCOUNTER — TELEPHONE (OUTPATIENT)
Dept: PSYCHOLOGY | Facility: CLINIC | Age: 71
End: 2021-10-20
Payer: COMMERCIAL

## 2021-10-20 NOTE — TELEPHONE ENCOUNTER
10:49  [facility] discontinue ebt benefits,  in building - had an appt got stuff together, she called her colleague at [facility], she faxed all the info to them.  Now they are saying his benefits will be discontinued still.  Can you call Clarke and check in if there is anything else that can be done to help?  Thank you!

## 2021-11-01 DIAGNOSIS — I50.32 CHRONIC HEART FAILURE WITH PRESERVED EJECTION FRACTION (H): ICD-10-CM

## 2021-11-01 RX ORDER — TORSEMIDE 100 MG/1
100 TABLET ORAL DAILY
Qty: 30 TABLET | Refills: 11 | Status: SHIPPED | OUTPATIENT
Start: 2021-11-01 | End: 2022-05-09

## 2021-11-01 NOTE — TELEPHONE ENCOUNTER
"Request for medication refill:torsemide (DEMADEX) 100 MG tablet. Note from Pharm: \"pt says he only takes 1tab every day. Please send new RX if this is correct. - Pharmacy\"    Providers if patient needs an appointment and you are willing to give a one month supply please refill for one month and  send a letter/MyChart using \".SMILLIMITEDREFILL\" .smillimited and route chart to \"P SMI \" (Giving one month refill in non controlled medications is strongly recommended before denial)    If refill has been denied, meaning absolutely no refills without visit, please complete the smart phrase \".smirxrefuse\" and route it to the \"P SMI MED REFILLS\"  pool to inform the patient and the pharmacy.    Anne Marie Tejada        "

## 2021-11-04 ENCOUNTER — TELEPHONE (OUTPATIENT)
Dept: ANTICOAGULATION | Facility: CLINIC | Age: 71
End: 2021-11-04

## 2021-11-04 ENCOUNTER — VIRTUAL VISIT (OUTPATIENT)
Dept: PSYCHOLOGY | Facility: CLINIC | Age: 71
End: 2021-11-04
Payer: COMMERCIAL

## 2021-11-04 DIAGNOSIS — F33.1 MODERATE EPISODE OF RECURRENT MAJOR DEPRESSIVE DISORDER (H): Primary | ICD-10-CM

## 2021-11-04 DIAGNOSIS — F41.1 GAD (GENERALIZED ANXIETY DISORDER): ICD-10-CM

## 2021-11-04 PROCEDURE — 90834 PSYTX W PT 45 MINUTES: CPT | Mod: 95 | Performed by: PSYCHOLOGIST

## 2021-11-04 NOTE — PROGRESS NOTES
"The following statement was read to the patient at the outset of this visit:     \"We have found that certain health care needs can be provided without the need for a face to face visit.  This service lets us provide the care you need with a phone conversation. I will have full access to your [facility] medical record during this entire phone call.   I will be taking notes for your medical record.  Since this is like an office visit, we will bill your insurance company for this service. There are potential benefits and risks of telephone visits (e.g. limits to patient confidentiality) that differ from in-person visits.?  Confidentiality still applies for telephone services, and nobody will record the visit.  It is important to be in a quiet, private space that is free of distractions (including cell phone or other devices) during the visit.??If during the course of the call I believe a telephone visit is not appropriate, you will not be charged for this service.\"     Consent has been obtained for this service by care team member: Yes     Emergency contact: Janice Hillman [phone]  [facility] Emergency Room: [place]  Location at time of call: home    Behavioral Health Progress Note    Client's Legal Name: Clarke Moreira    Client's Preferred Name: Clarke  YOB: 1950  Type of Service: telephone, counseling  Length of Service:   Start time: 9:08  End time: 9:50  Duration:  42 minutes  Attendees: patient    Identifying Information and Presenting Problem:    The patient is a 71 year old American male who I have seen off and on for therapy over the past couple of years.  Clarke did have difficulties at times coming into clinic for appointments as the anxiety would often lead him to cancel appointments.  Since the pandemic started and we have been doing phone visits, Clarke reports feeling much more comfortable in all of his appointments when they are done via telephone than when he is in person. " " In person appointments with all providers have always caused significant distress due to his desire to be perceived in a particular way.  On the telephone, he feels as if he can better articulate his experience and what he is thinking as he feels less need to project a particular image to those that are caring for him.  More recently, Clarke has developed increased pain and discomfort with his toes, but his anxiety about going in for treatment was keeping him from needed appointments.  We have been focused on increasing skills to cope with anxiety so that he can stay appropriately engaged in needed healthcare.     Treatment Objective(s) Addressed in This Session:    Clarke will practice a healthy daily discipline of diet and exercise.  Clarke will learn to manage anxiety so he can make and keep appointments     Progress on / Status of Treatment Objective(s) / Homework:  Feeling slightly better than our last visit, depression is less intense.    PHQ-9:   PHQ 11/9/2020 1/6/2020 9/3/2019   PHQ-9 Total Score 22 21 25   Q9: Thoughts of better off dead/self-harm past 2 weeks More than half the days Several days Nearly every day   F/U: Thoughts of suicide or self-harm - No -   F/U: Safety concerns - No -      CHRIS-7:   CHRIS-7 SCORE 9/3/2019 5/10/2019 12/11/2018   Total Score - - -   Total Score 19 13 20     Topics Discussed/Interventions Provided:  Clarke states these past weeks have been better.  \"I'm not scaring myself with thoughts of suicide anymore.\"  Continues to struggle with a \"sense of purposelessness\"  Reports he got a \"secondhand\" invitation to a [employer] dinner.  Explored this further.  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  He states he has no business going to this dinner because he doesn't want them to see what's become of him.  Feels embarrassed and ashamed. Challenged the negative thoughts " he was having about this situation.  Explored if there was another lens through which he could review this invitation.  Very difficult for him to see that there could be some positive outcomes to this dinner.  Reviewed the progress he has made in terms of taking actions that are outside of his comfort zone - they haven't always gone the way he would like, but he does feel better about himself after taking the action.  Reinforced these attempts.  Provided empathy and validation for the struggles he is experiencing.      Assessment:   The patient appeared to be active and engaged in today's session and was receptive to feedback.     Mental Status:   During our visit today, Clarke was cooperative, open, engaged and respectful. He was alert and oriented to person, place, time, and situation.   Speech was normal limits tone, rate, volume; largely coherent and relevant to topic.  His manner of speech is always quite deliberate and focused.  Mood was depressed, though described as less depressed than last visit; affect was blunted.  Thought processes were logical and coherent. Thought content included hopelessness with no evidence of suicide, homicide, or nonsuicidal self injury related thoughts, intent, urges, planning, behavior/recent attempts  Memory appeared grossly intact without being formally evaluated. Insight and judgment are good.  Patient exhibited good impulse control during the appointment.     Does the patient appear to be at imminent risk of harm to self/others at this time? No    The session was necessary to address negative and unhelpful thoughts that contribute to depression and low self-view.  Ongoing depressive symptoms and low self-view continue to interfere with patient's ability to function in areas related to social relationships day to day self care or health behaviors chronic disease management household management. Ongoing psychotherapy is necessary to stabilize mood, provide counseling, improve  functioning with daily activities and provide support.    DSM-5 Diagnosis:  Major Depression, recurrent, moderate  Generalized Anxiety Disorder  R/o persistent depressive disorder    Plan:  - f/up on 12/9 at 9am  - update diagnostic assessment    Lety Buenrostro PsyD    NOTE: Treatment plan update needed on 3/10/21.  Diagnostic assessment update due 10/15/19.

## 2021-11-04 NOTE — TELEPHONE ENCOUNTER
ANTICOAGULATION     Clarke Moreira is overdue for INR check.      Left message for patient to call and schedule lab appointment as soon as possible. If returning call, please schedule.     Robinson Lara RN

## 2021-11-10 NOTE — TELEPHONE ENCOUNTER
Social Work Phone Call    11/10/2021   3:10 PM    Data/Intervention:  Patient Name:  Clarke Moreira  /Age:  1950 (71 year old)    Spoke with: Clarke     Reason for Call:  Benefits Discussions    Assessment/ Plan:  SW called Clarke and LVM for him regarding hoping to help him with his EBT benefits. SW left her direct phone number for him to return call.     YAAKOV Francois  Pronouns: She/Her/Hers  , Care Coordination  [facility]  [phone]

## 2021-12-02 ENCOUNTER — ANTICOAGULATION THERAPY VISIT (OUTPATIENT)
Dept: ANTICOAGULATION | Facility: CLINIC | Age: 71
End: 2021-12-02

## 2021-12-02 ENCOUNTER — OFFICE VISIT (OUTPATIENT)
Dept: OPHTHALMOLOGY | Facility: CLINIC | Age: 71
End: 2021-12-02
Payer: COMMERCIAL

## 2021-12-02 ENCOUNTER — LAB (OUTPATIENT)
Dept: LAB | Facility: CLINIC | Age: 71
End: 2021-12-02
Payer: COMMERCIAL

## 2021-12-02 VITALS — WEIGHT: 315 LBS | BODY MASS INDEX: 42.66 KG/M2 | HEIGHT: 72 IN

## 2021-12-02 DIAGNOSIS — H04.129 DRY EYE: ICD-10-CM

## 2021-12-02 DIAGNOSIS — E11.9 TYPE 2 DIABETES MELLITUS WITHOUT COMPLICATION, WITHOUT LONG-TERM CURRENT USE OF INSULIN (H): Primary | ICD-10-CM

## 2021-12-02 DIAGNOSIS — R68.89 SUSPECTED GLAUCOMA OF BOTH EYES: ICD-10-CM

## 2021-12-02 DIAGNOSIS — H02.22C MECHANICAL LAGOPHTHALMOS OF BOTH UPPER AND LOWER EYELIDS OF BOTH EYES: ICD-10-CM

## 2021-12-02 DIAGNOSIS — H52.4 ASTIGMATISM OF BOTH EYES WITH PRESBYOPIA: ICD-10-CM

## 2021-12-02 DIAGNOSIS — I48.20 CHRONIC ATRIAL FIBRILLATION (H): ICD-10-CM

## 2021-12-02 DIAGNOSIS — H52.203 ASTIGMATISM OF BOTH EYES WITH PRESBYOPIA: ICD-10-CM

## 2021-12-02 DIAGNOSIS — I48.20 CHRONIC ATRIAL FIBRILLATION (H): Primary | ICD-10-CM

## 2021-12-02 LAB — INR BLD: 2.3 (ref 0.9–1.1)

## 2021-12-02 PROCEDURE — 92004 COMPRE OPH EXAM NEW PT 1/>: CPT | Performed by: OPTOMETRIST

## 2021-12-02 PROCEDURE — 85610 PROTHROMBIN TIME: CPT | Performed by: PATHOLOGY

## 2021-12-02 PROCEDURE — 92133 CPTRZD OPH DX IMG PST SGM ON: CPT | Performed by: OPTOMETRIST

## 2021-12-02 PROCEDURE — 92015 DETERMINE REFRACTIVE STATE: CPT | Performed by: OPTOMETRIST

## 2021-12-02 PROCEDURE — 36415 COLL VENOUS BLD VENIPUNCTURE: CPT | Performed by: PATHOLOGY

## 2021-12-02 RX ORDER — ERYTHROMYCIN 5 MG/G
0.5 OINTMENT OPHTHALMIC AT BEDTIME
Qty: 3.5 G | Refills: 11 | Status: SHIPPED | OUTPATIENT
Start: 2021-12-02 | End: 2022-05-09

## 2021-12-02 ASSESSMENT — REFRACTION_WEARINGRX
OD_CYLINDER: +1.50
OS_SPHERE: -1.00
OD_SPHERE: -0.50
OS_CYLINDER: +1.75
OS_AXIS: 171
OD_ADD: +2.25
OS_ADD: +2.25
OD_AXIS: 010

## 2021-12-02 ASSESSMENT — TONOMETRY
IOP_METHOD: TONOPEN
OS_IOP_MMHG: 17
OD_IOP_MMHG: 17

## 2021-12-02 ASSESSMENT — EXTERNAL EXAM - LEFT EYE: OS_EXAM: NORMAL

## 2021-12-02 ASSESSMENT — VISUAL ACUITY
OS_CC: 20/25
OD_CC: J1
METHOD: SNELLEN - LINEAR
OS_CC+: -1
CORRECTION_TYPE: GLASSES
OD_CC+: -2
OD_CC: 20/25
OS_CC: J1

## 2021-12-02 ASSESSMENT — SLIT LAMP EXAM - LIDS
COMMENTS: 1+ MGD
COMMENTS: 1+ MGD

## 2021-12-02 ASSESSMENT — REFRACTION_MANIFEST
OS_SPHERE: -1.00
OS_ADD: +2.50
OD_CYLINDER: +1.50
OD_SPHERE: PLANO
OS_AXIS: 172
OS_CYLINDER: +2.00
OD_AXIS: 015
OD_ADD: +2.50

## 2021-12-02 ASSESSMENT — CONF VISUAL FIELD
OD_NORMAL: 1
OS_NORMAL: 1
METHOD: COUNTING FINGERS

## 2021-12-02 ASSESSMENT — CUP TO DISC RATIO
OS_RATIO: 0.6
OD_RATIO: 0.7

## 2021-12-02 ASSESSMENT — EXTERNAL EXAM - RIGHT EYE: OD_EXAM: NORMAL

## 2021-12-02 ASSESSMENT — MIFFLIN-ST. JEOR: SCORE: 2607.39

## 2021-12-02 NOTE — PROGRESS NOTES
A/P  1.) Type 2 DM without ophthalmic manifestation OU  -Last A1c 6.2. Previous h/o mild NPDR (single dot heme)  -Reviewed effects of DM on the eyes and importance of good blood sugar control  -Continue to monitor annually with dilation    2.) Astigmatism/Presbyopia OU  -Corrects well with updated Rx     3.) Glaucoma suspect  -Normal OCT today, normal IOP  -Large C/D but healthy. Continue to monitor    4.) Lagophthalmos with dry eye OU  -Symptomatic for FBS. Difficult to get AT in  -Start ointment at bedtime. Pt has CPAP machine. Can also consider nighttime goggles/press n seal if needed    Monitor 1 year diabetic eye exam, sooner prn    I have confirmed the patient's CC, HPI and reviewed Past Medical History, Past Surgical History, Social History, Family History, Problem List, Medication List and agree with Tech note.     Vielka Bull, MARISOL BHANDARIO APOLONIAS

## 2021-12-02 NOTE — NURSING NOTE
Chief Complaints and History of Present Illnesses   Patient presents with     COMPREHENSIVE EYE EXAM     Diabetic with  Mild NPDR left eye/Corneal dystrophy, stromal - Left Eye. Cataracts each eye and Glaucoma Suspect each eye.     Chief Complaint(s) and History of Present Illness(es)     COMPREHENSIVE EYE EXAM     Laterality: both eyes    Associated symptoms: dryness.  Negative for redness, discharge and eye pain    Treatments tried: artificial tears    Pain scale: 0/10    Comments: Diabetic with  Mild NPDR left eye/Corneal dystrophy, stromal - Left Eye. Cataracts each eye and Glaucoma Suspect each eye.              Comments     Vision not as clear with each eye both distance and near with each eye since last exam in 2018. Did update glasses in 2018 and the glasses were clearier with first getting. Has been getting eye strain and headaches with try to read the smaller print. each eye are so dry at times with wake with discomfort and lids feel stuck will have to rinse each eye out at the sink to relieve. The past 1+ year each eye. Does use lubricants but difficult getting them in.     ...........Lab Results       Component                Value               Date                       A1C                      6.2                 05/06/2021                 A1C                      5.9                 11/09/2020                 A1C                      5.9                 07/09/2020                 A1C                      6.1                 02/26/2019                 A1C                      5.7                 08/28/2018            Linn Tha, COT COT 9:31 AM December 2, 2021

## 2021-12-09 ENCOUNTER — TELEPHONE (OUTPATIENT)
Dept: FAMILY MEDICINE | Facility: CLINIC | Age: 71
End: 2021-12-09
Payer: COMMERCIAL

## 2021-12-09 ENCOUNTER — VIRTUAL VISIT (OUTPATIENT)
Dept: PSYCHOLOGY | Facility: CLINIC | Age: 71
End: 2021-12-09
Payer: COMMERCIAL

## 2021-12-09 DIAGNOSIS — F33.1 MODERATE EPISODE OF RECURRENT MAJOR DEPRESSIVE DISORDER (H): Primary | ICD-10-CM

## 2021-12-09 DIAGNOSIS — F41.1 GAD (GENERALIZED ANXIETY DISORDER): ICD-10-CM

## 2021-12-09 PROCEDURE — 90834 PSYTX W PT 45 MINUTES: CPT | Mod: 95 | Performed by: PSYCHOLOGIST

## 2021-12-09 NOTE — PROGRESS NOTES
"The following statement was read to the patient at the outset of this visit:     \"We have found that certain health care needs can be provided without the need for a face to face visit.  This service lets us provide the care you need with a phone conversation. I will have full access to your [facility] medical record during this entire phone call.   I will be taking notes for your medical record.  Since this is like an office visit, we will bill your insurance company for this service. There are potential benefits and risks of telephone visits (e.g. limits to patient confidentiality) that differ from in-person visits.?  Confidentiality still applies for telephone services, and nobody will record the visit.  It is important to be in a quiet, private space that is free of distractions (including cell phone or other devices) during the visit.??If during the course of the call I believe a telephone visit is not appropriate, you will not be charged for this service.\"     Consent has been obtained for this service by care team member: Yes     Emergency contact: Janice Hillman [phone]  [facility] Emergency Room: [place]  Location at time of call: home    Behavioral Health Progress Note    Client's Legal Name: Clarke Moreira    Client's Preferred Name: Clarke  YOB: 1950  Type of Service: telephone, counseling  Length of Service:   Start time: 9:02  End time: 9:48  Duration:  46 minutes, called via [employer] initially, but was choppy so called back using *67 and connection was much improved  Attendees: patient    Identifying Information and Presenting Problem:    The patient is a 71 year old American male who I have seen off and on for therapy over the past couple of years.  Clarke did have difficulties at times coming into clinic for appointments as the anxiety would often lead him to cancel appointments.  Since the pandemic started and we have been doing phone visits, Clarke reports feeling " much more comfortable in all of his appointments when they are done via telephone than when he is in person.  In person appointments with all providers have always caused significant distress due to his desire to be perceived in a particular way.  On the telephone, he feels as if he can better articulate his experience and what he is thinking as he feels less need to project a particular image to those that are caring for him.  More recently, Clarke has developed increased pain and discomfort with his toes, but his anxiety about going in for treatment was keeping him from needed appointments.  We have been focused on increasing skills to cope with anxiety so that he can stay appropriately engaged in needed healthcare.     Treatment Objective(s) Addressed in This Session:    Clarke will practice a healthy daily discipline of diet and exercise.  Clarke will learn to manage anxiety so he can make and keep appointments     Progress on / Status of Treatment Objective(s) / Homework:  Stable, ongoing depression and low self-view    PHQ-9:   PHQ 11/9/2020 1/6/2020 9/3/2019   PHQ-9 Total Score 22 21 25   Q9: Thoughts of better off dead/self-harm past 2 weeks More than half the days Several days Nearly every day   F/U: Thoughts of suicide or self-harm - No -   F/U: Safety concerns - No -      CHRIS-7:   CHRIS-7 SCORE 9/3/2019 5/10/2019 12/11/2018   Total Score - - -   Total Score 19 13 20     Topics Discussed/Interventions Provided:  Clarke states that the last week has been challenging. Noticed that he has been plagued with more memories of relationship failures out of the blue.  Explored thoughts that he experiences in reaction to these memories.  Worked to challenge these thoughts and reframe the self-deprecating thoughts he is having in reaction to these memories.  Also brings up memories related to his mom that impacted his feelings of self worth in these relationships.  Discussed that her treatment of him as being  related to her and her view of herself.  That her treatment of him is not his fault.  Celebrated positive steps Clarke has taken on a couple of situations that have felt big and overwhelming.  Shared frustrations regarding caring for his legs. Will ask triage nurse to call him to discuss further.  Provided empathy and validation for the struggles he is experiencing.      Assessment:   The patient appeared to be active and engaged in today's session and was receptive to feedback.     Mental Status:   During our visit today, Clarke was open, engaged and respectful. He was alert and oriented to person, place, time, and situation.   Speech was normal rate and rhythm.  Manner of speech is typically deliberate and focused.  Mood was depressed, though described as not as depressed as he has been previouslyt; affect was blunted.  Thought processes were logical and coherent. Thought content included hopelessness with no evidence of suicide, homicide, or nonsuicidal self injury related thoughts, intent, urges, planning, behavior/recent attempts  Memory appeared grossly intact without being formally evaluated. Insight and judgment are good.  Patient exhibited good impulse control during the appointment.     Does the patient appear to be at imminent risk of harm to self/others at this time? No    The session was necessary to address negative and unhelpful thoughts that contribute to depression and low self-view.  Ongoing depressive symptoms and low self-view continue to interfere with patient's ability to function in areas related to social relationships day to day self care or health behaviors chronic disease management household management. Ongoing psychotherapy is necessary to stabilize mood, provide counseling, improve functioning with daily activities and provide support.    DSM-5 Diagnosis:  Major Depression, recurrent, moderate  Generalized Anxiety Disorder  R/o persistent depressive disorder    Plan:  - f/up on 1/6 at  9am  - update diagnostic assessment  - asked one of our triage nurses to call Clarke to discuss the status of his legs further and determine what would be the best next steps  - open arms bringing food lots - asked them my options worked out on mon they will help me with sandwich kits for lunch, salad more fruit, cut back on meals advocated for self  - renters tax refund - contacted people spits and starts, sat down with paperwokr I had and read it unemotionally    eLty Buenrostro PsyD    NOTE: Treatment plan update needed on 3/10/21.  Diagnostic assessment update due 10/15/19.

## 2021-12-09 NOTE — TELEPHONE ENCOUNTER
"RN reached out to patient after session with Dr. Buenrostro to address concerns that patient is having about leg circulation. Patient reports that over last couple of days he has noticed changes to skin on BLE. Reports hx of diabetes and circulation issues. Reports that issue is present on BLE but more prominent on LLE. Describes LLE calf to ankle skin seems \"splotchy\" and greenish/purple \"like when bruise is healing.\" Patches to the outside of the foot and near his toes.  Denies changes in medications. Denies changes in mobility or increased pain. Uses hospital bed at home and elevates his legs.     Denies changes in sensation, but describes an increase in what he feels as \"neuropathic pain\" over the last few weeks, especially in his feet. He notices that neuropathic pain increases when laying down.     RN acknowledged that patient has been working closely with Dr. Sanchez, offered to schedule appointment with him in early January. Also offered to discuss with faculty preceptor for recommendations. Patient declined scheduling appointment at this time, requested feedback and recommendations from faculty preceptor. Also stated he has visit with orthopedics next Friday and would like recommendations on if any intervention can or needs to be done before then.     Nneka Emery RN      "

## 2021-12-09 NOTE — TELEPHONE ENCOUNTER
Chart reviewed. Given patient's history of problems with lower extremity infection, I would recommend in-person evaluation sooner than his upcoming Podiatry appt given he has both skin changes and worsening pain. It is hard for me to say that this is not something worrisome based on his description alone, and I would worry about waiting over a week until his appt with Demond if this is something concerning. Certainly he could also call DPM's office to see if they can get him in sooner if that's his preference, otherwise would offer [facility] appt.

## 2021-12-10 NOTE — TELEPHONE ENCOUNTER
"RN spoke with patient to discuss recommendations from Dr. Pickard. Patient limited with transport and unable to do same-day appointment. Reports that discoloration in LLE has \"mellowed\" in last 24 hours. Denies no heat or tenderness or skin breakdown at the sign. Declined scheduling appointment at this time, states he \"feels secure\" in waiting until next Friday for his appointment. Patient very appreciative of call.     Nneka Emery RN    "

## 2021-12-17 ENCOUNTER — OFFICE VISIT (OUTPATIENT)
Dept: ORTHOPEDICS | Facility: CLINIC | Age: 71
End: 2021-12-17
Payer: COMMERCIAL

## 2021-12-17 DIAGNOSIS — E11.49 TYPE II OR UNSPECIFIED TYPE DIABETES MELLITUS WITH NEUROLOGICAL MANIFESTATIONS, NOT STATED AS UNCONTROLLED(250.60) (H): Primary | ICD-10-CM

## 2021-12-17 DIAGNOSIS — B35.1 OM (ONYCHOMYCOSIS): ICD-10-CM

## 2021-12-17 PROCEDURE — 99213 OFFICE O/P EST LOW 20 MIN: CPT | Performed by: PODIATRIST

## 2021-12-17 NOTE — LETTER
12/17/2021         RE: Clarke Moreira  [address]  [place] [address]        Dear Colleague,    Thank you for referring your patient, Clarke Moreira, to the [facility] [place]. Please see a copy of my visit note below.    Chief Complaint   Patient presents with     Follow Up     9 week follow up.               Allergies   Allergen Reactions     Seasonal Allergies          Subjective: Clarke is a 71 year old male who presents to the clinic today for a diabetic foot ulcer. He relates that he has been doing well. He has no new open lesions.     Objective  Hemoglobin A1C POCT   Date Value Ref Range Status   05/06/2021 6.2 (H) 0 - 5.6 % Final     Comment:     Normal <5.7% Prediabetes 5.7-6.4%  Diabetes 6.5% or higher - adopted from ADA   consensus guidelines.     11/09/2020 5.9 (H) 4.1 - 5.7 % Final   07/09/2020 5.9 (H) 0 - 5.6 % Final     Comment:     Normal <5.7% Prediabetes 5.7-6.4%  Diabetes 6.5% or higher - adopted from ADA   consensus guidelines.         DP and PT pulses 1/4 BL. CRT 1 second. Absent pedal hair.  Gross and protective sensations are diminished BL.  Hammertoes to all lesser digits. Hallux malleus BL. Equinus BL. Pes planus.   Onychomycosis to toes x10.  Tyloma noted to the left 5th digit DIPJ, right dorsal hallux IPJ, and right 5th DIPJ  A diabetic wound is noted at left  dorsal hallux IPJ has healed. No s/s of acute infection noted. No drainage noted to the area. No pain with palpation.        Assessment: Clarke is a 70 YO male with DM2 and neuropathy.  Ulceration on the left dorsal hallux. Clinically, this has healed.   Onychomycosis  Tyloma.      Plan:   - Pt seen and evaluated  - Nails debrided x 10.   - Daily foot exams.   - Pt to return to clinic in 9 weeks or sooner if problems arise.     Jesús Lagos DPM

## 2021-12-17 NOTE — PROGRESS NOTES
Chief Complaint   Patient presents with     Follow Up     9 week follow up.               Allergies   Allergen Reactions     Seasonal Allergies          Subjective: Clarke is a 71 year old male who presents to the clinic today for a diabetic foot ulcer. He relates that he has been doing well. He has no new open lesions.     Objective  Hemoglobin A1C POCT   Date Value Ref Range Status   05/06/2021 6.2 (H) 0 - 5.6 % Final     Comment:     Normal <5.7% Prediabetes 5.7-6.4%  Diabetes 6.5% or higher - adopted from ADA   consensus guidelines.     11/09/2020 5.9 (H) 4.1 - 5.7 % Final   07/09/2020 5.9 (H) 0 - 5.6 % Final     Comment:     Normal <5.7% Prediabetes 5.7-6.4%  Diabetes 6.5% or higher - adopted from ADA   consensus guidelines.         DP and PT pulses 1/4 BL. CRT 1 second. Absent pedal hair.  Gross and protective sensations are diminished BL.  Hammertoes to all lesser digits. Hallux malleus BL. Equinus BL. Pes planus.   Onychomycosis to toes x10.  Tyloma noted to the left 5th digit DIPJ, right dorsal hallux IPJ, and right 5th DIPJ  A diabetic wound is noted at left  dorsal hallux IPJ has healed. No s/s of acute infection noted. No drainage noted to the area. No pain with palpation.        Assessment: Clarke is a 68 YO male with DM2 and neuropathy.  Ulceration on the left dorsal hallux. Clinically, this has healed.   Onychomycosis  Tyloma.      Plan:   - Pt seen and evaluated  - Nails debrided x 10.   - Daily foot exams.   - Pt to return to clinic in 9 weeks or sooner if problems arise.

## 2021-12-27 DIAGNOSIS — I50.30 HEART FAILURE WITH PRESERVED EJECTION FRACTION, UNSPECIFIED HF CHRONICITY (H): ICD-10-CM

## 2021-12-29 DIAGNOSIS — E78.5 HYPERLIPIDEMIA LDL GOAL <100: ICD-10-CM

## 2021-12-29 RX ORDER — SPIRONOLACTONE 25 MG/1
50 TABLET ORAL DAILY
Qty: 180 TABLET | Refills: 1 | Status: SHIPPED | OUTPATIENT
Start: 2021-12-29 | End: 2022-05-09

## 2021-12-29 NOTE — TELEPHONE ENCOUNTER
"Request for medication refill:  omega 3 1000 MG CAPS    Providers if patient needs an appointment and you are willing to give a one month supply please refill for one month and  send a letter/MyChart using \".SMILLIMITEDREFILL\" .smillimited and route chart to \"P SMI \" (Giving one month refill in non controlled medications is strongly recommended before denial)    If refill has been denied, meaning absolutely no refills without visit, please complete the smart phrase \".smirxrefuse\" and route it to the \"P SMI MED REFILLS\"  pool to inform the patient and the pharmacy.    Soni Borja MA        "

## 2021-12-29 NOTE — TELEPHONE ENCOUNTER
Spironolactone Oral Tablet 25 MG  Last Written Prescription Date:  9/28/2021  Last Fill Quantity: 180,   # refills: 0  Last Office Visit :  3/31/2021  Future Office visit:  None  180 Tabs, 1 Refill sent to pharm 12/29/2021      Selin Garcia RN  Central Triage Red Flags/Med Refills    Warnings Override History for spironolactone (ALDACTONE) 25 MG tablet [[id number]]    Overridden by Desi Key RN on Sep 28, 2021 12:12 PM   Drug-Drug   1. POTASSIUM-SPARING DIURETICS / POTASSIUM PREPARATIONS [Level: Major]   Other Orders: potassium chloride ER (KLOR-CON M) 20 MEQ CR tablet

## 2021-12-30 RX ORDER — OMEGA-3 FATTY ACIDS/FISH OIL 300-1000MG
1 CAPSULE ORAL DAILY
Qty: 30 CAPSULE | Refills: 11 | Status: SHIPPED | OUTPATIENT
Start: 2021-12-30 | End: 2022-05-09

## 2022-01-06 ENCOUNTER — VIRTUAL VISIT (OUTPATIENT)
Dept: PSYCHOLOGY | Facility: CLINIC | Age: 72
End: 2022-01-06
Payer: COMMERCIAL

## 2022-01-06 DIAGNOSIS — F33.1 MODERATE EPISODE OF RECURRENT MAJOR DEPRESSIVE DISORDER (H): Primary | ICD-10-CM

## 2022-01-06 DIAGNOSIS — F41.1 GAD (GENERALIZED ANXIETY DISORDER): ICD-10-CM

## 2022-01-06 PROCEDURE — 90834 PSYTX W PT 45 MINUTES: CPT | Mod: 95 | Performed by: PSYCHOLOGIST

## 2022-01-06 NOTE — PROGRESS NOTES
"The following statement was read to the patient at the outset of this visit:     \"We have found that certain health care needs can be provided without the need for a face to face visit.  This service lets us provide the care you need with a phone conversation. I will have full access to your [facility] medical record during this entire phone call.   I will be taking notes for your medical record.  Since this is like an office visit, we will bill your insurance company for this service. There are potential benefits and risks of telephone visits (e.g. limits to patient confidentiality) that differ from in-person visits.?  Confidentiality still applies for telephone services, and nobody will record the visit.  It is important to be in a quiet, private space that is free of distractions (including cell phone or other devices) during the visit.??If during the course of the call I believe a telephone visit is not appropriate, you will not be charged for this service.\"     Consent has been obtained for this service by care team member: Yes     Emergency contact: Janice Hillman [phone]  [facility] Emergency Room: [place]  Location at time of call: home    Behavioral Health Progress Note    Client's Legal Name: Clarke Moreira    Client's Preferred Name: Clarke  YOB: 1950  Type of Service: telephone, counseling  Length of Service:   Start time: 9:02  End time: 9:48  Duration:  46 minutes, called via [employer] initially, but was choppy so called back using *67 and connection was much improved  Attendees: patient    Identifying Information and Presenting Problem:    The patient is a 71 year old American male who I have seen off and on for therapy over the past couple of years.  Clarke did have difficulties at times coming into clinic for appointments as the anxiety would often lead him to cancel appointments.  Since the pandemic started and we have been doing phone visits, Clarke reports feeling " much more comfortable in all of his appointments when they are done via telephone than when he is in person.  In person appointments with all providers have always caused significant distress due to his desire to be perceived in a particular way.  On the telephone, he feels as if he can better articulate his experience and what he is thinking as he feels less need to project a particular image to those that are caring for him.  More recently, Clarke has developed increased pain and discomfort with his toes, but his anxiety about going in for treatment was keeping him from needed appointments.  We have been focused on increasing skills to cope with anxiety so that he can stay appropriately engaged in needed healthcare.     Treatment Objective(s) Addressed in This Session:    Clarke will practice a healthy daily discipline of diet and exercise.  Clarke will learn to manage anxiety so he can make and keep appointments     Progress on / Status of Treatment Objective(s) / Homework:  Ongoing depression and low self-view, but stable    PHQ-9:   PHQ 11/9/2020 1/6/2020 9/3/2019   PHQ-9 Total Score 22 21 25   Q9: Thoughts of better off dead/self-harm past 2 weeks More than half the days Several days Nearly every day   F/U: Thoughts of suicide or self-harm - No -   F/U: Safety concerns - No -      CHRIS-7:   CHRIS-7 SCORE 9/3/2019 5/10/2019 12/11/2018   Total Score - - -   Total Score 19 13 20     Topics Discussed/Interventions Provided:  Self-deprecating thoughts - challenged and reframed thoughts  Provided positive reinforcement for positive steps Clarke has taken in response to a health concern. Bottom had broken open on insulated cup and the chemical was seeping unknowingly into water. Has thrown that cup away.  Processed emotion and provided empathic listening regarding past experiences in family.  Discussed social isolation - spoke to a friend. Unsure about calling back because doesn't have long distance. Discussed  options through ipad for wifi calling.     Assessment:   The patient appeared to be active and engaged in today's session and was receptive to feedback.     Mental Status:   During our visit today, Clarke was open and easy to engage with. He was alert and oriented to person, place, time, and situation.   Speech was normal rate and rhythm.  Mood was down, affect was blunted, mood-congruent.  Thought processes were logical and coherent. Thought content included hopelessness with no evidence of suicide, homicide, or nonsuicidal self injury related thoughts, intent, urges, planning, behavior/recent attempts  Memory appeared grossly intact without being formally evaluated. Insight and judgment are good.  Patient exhibited good impulse control during the appointment.     Does the patient appear to be at imminent risk of harm to self/others at this time? No    The session was necessary to address negative and unhelpful thoughts that contribute to depression and low self-view.  Ongoing depressive symptoms and low self-view continue to interfere with patient's ability to function in areas related to social relationships day to day self care or health behaviors chronic disease management household management. Ongoing psychotherapy is necessary to stabilize mood, provide counseling, improve functioning with daily activities and provide support.    DSM-5 Diagnosis:  Major Depression, recurrent, moderate  Generalized Anxiety Disorder  R/o persistent depressive disorder    Plan:  - f/up on 2/3  - update diagnostic assessment    Lety Buenrostro PsyD    NOTE: Treatment plan update needed on 3/10/21.  Diagnostic assessment update due 10/15/19.

## 2022-01-10 ENCOUNTER — TELEPHONE (OUTPATIENT)
Dept: FAMILY MEDICINE | Facility: CLINIC | Age: 72
End: 2022-01-10
Payer: COMMERCIAL

## 2022-01-10 DIAGNOSIS — I48.19 PERSISTENT ATRIAL FIBRILLATION (H): ICD-10-CM

## 2022-01-10 RX ORDER — WARFARIN SODIUM 5 MG/1
TABLET ORAL
Qty: 180 TABLET | Refills: 1 | Status: SHIPPED | OUTPATIENT
Start: 2022-01-10 | End: 2022-05-09

## 2022-01-31 DIAGNOSIS — K59.00 CONSTIPATION, UNSPECIFIED CONSTIPATION TYPE: ICD-10-CM

## 2022-01-31 RX ORDER — AMOXICILLIN 250 MG
1-2 CAPSULE ORAL 2 TIMES DAILY PRN
Qty: 60 TABLET | Refills: 3 | Status: SHIPPED | OUTPATIENT
Start: 2022-01-31 | End: 2022-05-24

## 2022-02-03 ENCOUNTER — VIRTUAL VISIT (OUTPATIENT)
Dept: PSYCHOLOGY | Facility: CLINIC | Age: 72
End: 2022-02-03
Payer: COMMERCIAL

## 2022-02-03 DIAGNOSIS — F33.1 MODERATE EPISODE OF RECURRENT MAJOR DEPRESSIVE DISORDER (H): Primary | ICD-10-CM

## 2022-02-03 DIAGNOSIS — F41.1 GAD (GENERALIZED ANXIETY DISORDER): ICD-10-CM

## 2022-02-03 PROCEDURE — 90834 PSYTX W PT 45 MINUTES: CPT | Mod: 95 | Performed by: PSYCHOLOGIST

## 2022-02-03 NOTE — PROGRESS NOTES
"The following statement was read to the patient at the outset of this visit:     \"We have found that certain health care needs can be provided without the need for a face to face visit.  This service lets us provide the care you need with a phone conversation. I will have full access to your [facility] medical record during this entire phone call.   I will be taking notes for your medical record.  Since this is like an office visit, we will bill your insurance company for this service. There are potential benefits and risks of telephone visits (e.g. limits to patient confidentiality) that differ from in-person visits.?  Confidentiality still applies for telephone services, and nobody will record the visit.  It is important to be in a quiet, private space that is free of distractions (including cell phone or other devices) during the visit.??If during the course of the call I believe a telephone visit is not appropriate, you will not be charged for this service.\"     Consent has been obtained for this service by care team member: Yes     Emergency contact: Janice Hillman [phone]  [facility] Emergency Room: [place]  Location at time of call: home    Behavioral Health Progress Note    Client's Legal Name: Clarke Moreira    Client's Preferred Name: Clarke  YOB: 1950  Type of Service: telephone, counseling  Length of Service:   Start time: 9:03  End time: 9:45  Duration:  42 minutes  Attendees: patient    Identifying Information and Presenting Problem:    The patient is a 71 year old American male who I have seen off and on for therapy over the past couple of years.  Clarke did have difficulties at times coming into clinic for appointments as the anxiety would often lead him to cancel appointments.  Since the pandemic started and we have been doing phone visits, Clarke reports feeling much more comfortable in all of his appointments when they are done via telephone than when he is in person. "  In person appointments with all providers have always caused significant distress due to his desire to be perceived in a particular way.  On the telephone, he feels as if he can better articulate his experience and what he is thinking as he feels less need to project a particular image to those that are caring for him.  More recently, Clarke has developed increased pain and discomfort with his toes, but his anxiety about going in for treatment was keeping him from needed appointments.  We have been focused on increasing skills to cope with anxiety so that he can stay appropriately engaged in needed healthcare.     Treatment Objective(s) Addressed in This Session:    Clarke will practice a healthy daily discipline of diet and exercise.  Clarke will learn to manage anxiety so he can make and keep appointments     Progress on / Status of Treatment Objective(s) / Homework:  Stable, but ongoing depression and low self-view    PHQ-9:   PHQ 11/9/2020 1/6/2020 9/3/2019   PHQ-9 Total Score 22 21 25   Q9: Thoughts of better off dead/self-harm past 2 weeks More than half the days Several days Nearly every day   F/U: Thoughts of suicide or self-harm - No -   F/U: Safety concerns - No -      CHRIS-7:   CHRIS-7 SCORE 9/3/2019 5/10/2019 12/11/2018   Total Score - - -   Total Score 19 13 20     Topics Discussed/Interventions Provided:  Clarke started out the visit today sharing several experiences that have happened in the last few weeks that have been uplifting and positive experiences for him.  Has long-standing fluctuating suicidal ideation.  Had an increase of these thoughts as well in the last week.  Has been trying to focus on what he is grateful for and experiences where he feels connected as a means to cope with these thoughts. Denies any plan/intent to harm himself.  Continue to challenge unhelpful and negative thoughts he has bout himself and work to reframe these to be more realistic and more helpful.     Assessment:    The patient appeared to be active and engaged in today's session and was receptive to feedback.     Mental Status:   During our visit today, Clarke was open, engaged, easy to engage with, pleasant and respectful. He was alert and oriented to person, place, time, and situation.   Speech was normal rate and rhythm.  Mood was down/low, affect was blunted, but seemed less so than our last visit.  Thought processes were logical and coherent. Thought content included hopelessness and suicidal ideation as described above. Memory appeared grossly intact without being formally evaluated. Insight and judgment are good.  Patient exhibited good impulse control during the appointment.     Does the patient appear to be at imminent risk of harm to self/others at this time? No    The session was necessary to address negative and unhelpful thoughts that contribute to depression and low self-view.  Ongoing depressive symptoms and low self-view continue to interfere with patient's ability to function in areas related to social relationships day to day self care or health behaviors chronic disease management household management. Ongoing psychotherapy is necessary to stabilize mood, provide counseling, improve functioning with daily activities and provide support.    DSM-5 Diagnosis:  Major Depression, recurrent, moderate  Generalized Anxiety Disorder  R/o persistent depressive disorder    Plan:  - f/up on 2/15  - update diagnostic assessment    Lety Buenrostro PsyD    NOTE: Treatment plan update needed on 3/10/21.  Diagnostic assessment update due 10/15/19.

## 2022-02-08 ENCOUNTER — TELEPHONE (OUTPATIENT)
Dept: ANTICOAGULATION | Facility: CLINIC | Age: 72
End: 2022-02-08
Payer: COMMERCIAL

## 2022-02-08 NOTE — TELEPHONE ENCOUNTER
ANTICOAGULATION     Clarke Moreira is overdue for INR check.      Spoke with Clarke and scheduled lab appointment on 2/18    Laurel Mcneal RN

## 2022-02-16 ENCOUNTER — VIRTUAL VISIT (OUTPATIENT)
Dept: PSYCHOLOGY | Facility: CLINIC | Age: 72
End: 2022-02-16
Payer: COMMERCIAL

## 2022-02-16 DIAGNOSIS — F33.1 MODERATE EPISODE OF RECURRENT MAJOR DEPRESSIVE DISORDER (H): Primary | ICD-10-CM

## 2022-02-16 DIAGNOSIS — F41.1 GAD (GENERALIZED ANXIETY DISORDER): ICD-10-CM

## 2022-02-16 PROCEDURE — 90834 PSYTX W PT 45 MINUTES: CPT | Mod: 95 | Performed by: PSYCHOLOGIST

## 2022-02-16 NOTE — PROGRESS NOTES
"The following statement was read to the patient at the outset of this visit:     \"We have found that certain health care needs can be provided without the need for a face to face visit.  This service lets us provide the care you need with a phone conversation. I will have full access to your [facility] medical record during this entire phone call.   I will be taking notes for your medical record.  Since this is like an office visit, we will bill your insurance company for this service. There are potential benefits and risks of telephone visits (e.g. limits to patient confidentiality) that differ from in-person visits.?  Confidentiality still applies for telephone services, and nobody will record the visit.  It is important to be in a quiet, private space that is free of distractions (including cell phone or other devices) during the visit.??If during the course of the call I believe a telephone visit is not appropriate, you will not be charged for this service.\"     Consent has been obtained for this service by care team member: Yes     Emergency contact: Janice Hillman [phone]  [facility] Emergency Room: [place]  Location at time of call: home    Behavioral Health Progress Note    Client's Legal Name: Clarke Moreira    Client's Preferred Name: Clarke  YOB: 1950  Type of Service: telephone, counseling  Length of Service:   Start time: 9:04  End time: 9:45  Duration: 41  minutes  Attendees: patient    Identifying Information and Presenting Problem:    The patient is a 71 year old American male who I have seen off and on for therapy over the past couple of years.  Clarke did have difficulties at times coming into clinic for appointments as the anxiety would often lead him to cancel appointments.  Since the pandemic started and we have been doing phone visits, Clarke reports feeling much more comfortable in all of his appointments when they are done via telephone than when he is in person. "  In person appointments with all providers have always caused significant distress due to his desire to be perceived in a particular way.  On the telephone, he feels as if he can better articulate his experience and what he is thinking as he feels less need to project a particular image to those that are caring for him.  Anxiety has kept Clarke from seeking medical care that he needs when he notices exacerbations of conditions.  We have been focused on increasing skills to cope with anxiety so that he can stay appropriately engaged in needed healthcare, as well as continuing to challenge very embedded negative core beliefs about himself.     Treatment Objective(s) Addressed in This Session:    Clarke will practice a healthy daily discipline of diet and exercise.  Clarke will learn to manage anxiety so he can make and keep appointments     Progress on / Status of Treatment Objective(s) / Homework:  Reports feeling more sad    PHQ-9:   PHQ 11/9/2020 1/6/2020 9/3/2019   PHQ-9 Total Score 22 21 25   Q9: Thoughts of better off dead/self-harm past 2 weeks More than half the days Several days Nearly every day   F/U: Thoughts of suicide or self-harm - No -   F/U: Safety concerns - No -      CHRIS-7:   CHRIS-7 SCORE 9/3/2019 5/10/2019 12/11/2018   Total Score - - -   Total Score 19 13 20     Topics Discussed/Interventions Provided:  Clarke reports that he has been feeling more sad.  Identifies precipitant as a book he has been reading and resulting self-reflection.  Focus of today's session was on Clarke's feelings of shame in regard to his tendency to lie.  States he has done this throughout his life.  Less often now, but still occurs.  Described a recent example when this happened.  Clarke describes in the moment interacting with someone feeling vulnerable and wanting to be seen as someone who has value to other people.  Explored if this is something he wanted to work on for himself.  Clarke was ambivalent about  pursuing this further at this time.  Isn't sure its worth the effort to try to change as he has so few interactions with people.     Clarke was concerned about returning to care with his heart care team.  Wonders if they haven't called him for follow up because they might be upset with him after the last time he was there.  Let him know it looked like there was a note from the heart clinic asking central scheduling to call him to set up an appointment.  Did not see any phone note that this happened.  Challenged and reframed thoughts around this situation.  Reassured him that this was likely due to oversight and/or staff shortages and not any upset they are feeling toward him.  Clarke did say he would give them a call to make an appointment.  Also encouraged him to make an appointment to see his PCP.      Assessment:   The patient appeared to be active and engaged in today's session and was receptive to feedback.     Mental Status:   During our visit today, Clarke was engaged, pleasant and respectful. He was alert and oriented to person, place, time, and situation.  Speech was normal rate and rhythm.  Mood was sad, affect was blunted.  Thought processes were logical and coherent. Thought content included significant negative thoughts about himself. Memory appeared grossly intact without being formally evaluated. Insight and judgment are good.  Patient exhibited good impulse control during the appointment.     Does the patient appear to be at imminent risk of harm to self/others at this time? No    The session was necessary to address negative and unhelpful thoughts that contribute to depression and low self-view.  Ongoing depressive symptoms and low self-view continue to interfere with patient's ability to function in areas related to social relationships day to day self care or health behaviors chronic disease management household management. Ongoing psychotherapy is necessary to stabilize mood, provide  counseling, improve functioning with daily activities and provide support.    DSM-5 Diagnosis:  Major Depression, recurrent, moderate  Generalized Anxiety Disorder  R/o persistent depressive disorder    Plan:  - f/up on 3/24  - update diagnostic assessment and treatment plan  - Clarke will call cardiology for appt and clinic for pcp appt    Lety Buenrostro PsyD    NOTE: Treatment plan update needed on 3/10/21.  Diagnostic assessment update due 10/15/19.

## 2022-03-07 ENCOUNTER — VIRTUAL VISIT (OUTPATIENT)
Dept: PSYCHOLOGY | Facility: CLINIC | Age: 72
End: 2022-03-07
Payer: COMMERCIAL

## 2022-03-07 DIAGNOSIS — F41.1 GAD (GENERALIZED ANXIETY DISORDER): ICD-10-CM

## 2022-03-07 DIAGNOSIS — F33.1 MODERATE EPISODE OF RECURRENT MAJOR DEPRESSIVE DISORDER (H): Primary | ICD-10-CM

## 2022-03-07 PROCEDURE — 90834 PSYTX W PT 45 MINUTES: CPT | Mod: 95 | Performed by: PSYCHOLOGIST

## 2022-03-07 NOTE — PROGRESS NOTES
"The following statement was read to the patient at the outset of this visit:     \"We have found that certain health care needs can be provided without the need for a face to face visit.  This service lets us provide the care you need with a phone conversation. I will have full access to your [facility] medical record during this entire phone call.   I will be taking notes for your medical record.  Since this is like an office visit, we will bill your insurance company for this service. There are potential benefits and risks of telephone visits (e.g. limits to patient confidentiality) that differ from in-person visits.?  Confidentiality still applies for telephone services, and nobody will record the visit.  It is important to be in a quiet, private space that is free of distractions (including cell phone or other devices) during the visit.??If during the course of the call I believe a telephone visit is not appropriate, you will not be charged for this service.\"     Consent has been obtained for this service by care team member: Yes     Emergency contact: Janice Hillman [phone]  [facility] Emergency Room: [place]  Location at time of call: home    Behavioral Health Progress Note    Client's Legal Name: Clarke Moreira    Client's Preferred Name: Clarke  YOB: 1950  Type of Service: telephone, counseling  Length of Service:   Start time: 9:05  End time: 9:50  Duration: 45 minutes  Attendees: patient    Identifying Information and Presenting Problem:    The patient is a 71 year old American male who I have seen off and on for therapy over the past couple of years.  Clarke did have difficulties at times coming into clinic for appointments as the anxiety would often lead him to cancel appointments.  Since the pandemic started and we have been doing phone visits, Clarke reports feeling much more comfortable in all of his appointments when they are done via telephone than when he is in person.  " "In person appointments with all providers have always caused significant distress due to his desire to be perceived in a particular way.  On the telephone, he feels as if he can better articulate his experience and what he is thinking as he feels less need to project a particular image to those that are caring for him.  Anxiety has kept Clarke from seeking medical care that he needs when he notices exacerbations of conditions.  We have been focused on increasing skills to cope with anxiety so that he can stay appropriately engaged in needed healthcare, as well as continuing to challenge very embedded negative core beliefs about himself.     Treatment Objective(s) Addressed in This Session:    Clarke will practice a healthy daily discipline of diet and exercise.  Clarke will learn to manage anxiety so he can make and keep appointments     Progress on / Status of Treatment Objective(s) / Homework:  \"i'm a miserable old sod\"    PHQ-9:   PHQ 11/9/2020 1/6/2020 9/3/2019   PHQ-9 Total Score 22 21 25   Q9: Thoughts of better off dead/self-harm past 2 weeks More than half the days Several days Nearly every day   F/U: Thoughts of suicide or self-harm - No -   F/U: Safety concerns - No -      CHRIS-7:   CHRIS-7 SCORE 9/3/2019 5/10/2019 12/11/2018   Total Score - - -   Total Score 19 13 20     Topics Discussed/Interventions Provided:  \"I don't feel interested in much of anything these days\"  My internal dialogue has moved from \"I can't\" to \"so what\"  Struggling with finding meaning/purpose.    Session focused today on two conversations Clarke had in the past two weeks and the heightened emotional response and rumination he experienced afterwards.  When trying to express emotion to others, will often add in something negative about himself.  Notices this throws people off.  Discussed his purpose in doing this - feels like he is trying to convince them that they shouldn't have anything to do with him. Explored what he would like " the conversation to look like the next time he interacts with him.  Encouraged him to rehearse this in his mind because the self-deprecating comment road is well established and this will help him to respond differently when the situation comes up again.  Second conversation was with a person he has known for many years. She is going through lymphoma treatment and asked Clarke if he might be able to help her.  Clarke automatically had thoughts that if they interacted more she wouldn't want to be around him anymore and then they wouldn't have any interaction. Asked him to think about what does he have to lose if he pursues this and what does he have to gain.  Discussed how he could have a conversation to better understand her request.      Assessment:   The patient appeared to be active and engaged in today's session and was receptive to feedback.     Mental Status:   During our visit today, Clarke was engaged, pleasant and respectful. He was alert and oriented to person, place, time, and situation.  Speech was normal rate and rhythm.  Mood was down, affect was blunted, appropriate to stated mood.  Thought processes were logical and coherent. Thought content included self-deprecating thoughts and hopelessness.   Memory appeared grossly intact without being formally evaluated. Insight and judgment are good.  Patient exhibited good impulse control during the appointment.     Does the patient appear to be at imminent risk of harm to self/others at this time? No    The session was necessary to address negative and unhelpful thoughts that contribute to depression and low self-view.  Ongoing depressive symptoms and low self-view continue to interfere with patient's ability to function in areas related to social relationships day to day self care or health behaviors chronic disease management household management. Ongoing psychotherapy is necessary to stabilize mood, provide counseling, improve functioning with daily  activities and provide support.    DSM-5 Diagnosis:  Major Depression, recurrent, moderate  Generalized Anxiety Disorder  R/o persistent depressive disorder    Plan:  - f/up on 3/24  - update diagnostic assessment and treatment plan  - Clarke did not call for cardiology and pcp appt - still needs to do so.     Lety Buenrostro PsyD    NOTE: Treatment plan update needed on 3/10/21.  Diagnostic assessment update due 10/15/19.

## 2022-03-15 ENCOUNTER — ANTICOAGULATION THERAPY VISIT (OUTPATIENT)
Dept: ANTICOAGULATION | Facility: CLINIC | Age: 72
End: 2022-03-15

## 2022-03-15 ENCOUNTER — LAB (OUTPATIENT)
Dept: LAB | Facility: CLINIC | Age: 72
End: 2022-03-15

## 2022-03-15 ENCOUNTER — OFFICE VISIT (OUTPATIENT)
Dept: ORTHOPEDICS | Facility: CLINIC | Age: 72
End: 2022-03-15
Payer: COMMERCIAL

## 2022-03-15 DIAGNOSIS — B35.1 OM (ONYCHOMYCOSIS): ICD-10-CM

## 2022-03-15 DIAGNOSIS — E11.49 TYPE II OR UNSPECIFIED TYPE DIABETES MELLITUS WITH NEUROLOGICAL MANIFESTATIONS, NOT STATED AS UNCONTROLLED(250.60) (H): Primary | ICD-10-CM

## 2022-03-15 DIAGNOSIS — I48.20 CHRONIC ATRIAL FIBRILLATION (H): ICD-10-CM

## 2022-03-15 DIAGNOSIS — I48.20 CHRONIC ATRIAL FIBRILLATION (H): Primary | ICD-10-CM

## 2022-03-15 LAB — INR BLD: 1.8 (ref 0.9–1.1)

## 2022-03-15 PROCEDURE — 99213 OFFICE O/P EST LOW 20 MIN: CPT | Performed by: PODIATRIST

## 2022-03-15 NOTE — PROGRESS NOTES
Chief Complaint   Patient presents with     Follow Up     9 week follow up.               Allergies   Allergen Reactions     Seasonal Allergies          Subjective: Clarke is a 71 year old male who presents to the clinic today for a diabetic foot ulcer. He relates that he has been doing well. He has no new open lesions.     Objective  Hemoglobin A1C POCT   Date Value Ref Range Status   05/06/2021 6.2 (H) 0 - 5.6 % Final     Comment:     Normal <5.7% Prediabetes 5.7-6.4%  Diabetes 6.5% or higher - adopted from ADA   consensus guidelines.     11/09/2020 5.9 (H) 4.1 - 5.7 % Final   07/09/2020 5.9 (H) 0 - 5.6 % Final     Comment:     Normal <5.7% Prediabetes 5.7-6.4%  Diabetes 6.5% or higher - adopted from ADA   consensus guidelines.         DP and PT pulses 1/4 BL. CRT 1 second. Absent pedal hair.  Gross and protective sensations are diminished BL.  Hammertoes to all lesser digits. Hallux malleus BL. Equinus BL. Pes planus.   Onychomycosis to toes x10.  With debridement of the right medial hallux nail, there was a sanguinous bulla subungually.  No signs or symptoms of infection.  Tyloma noted to the left 5th digit DIPJ, right dorsal hallux IPJ, and right 5th DIPJ  A diabetic wound is noted at left  dorsal hallux IPJ has healed. No s/s of acute infection noted. No drainage noted to the area. No pain with palpation.        Assessment: Clarke is a 68 YO male with DM2 and neuropathy.  Ulceration on the left dorsal hallux. Clinically, this has healed.   Onychomycosis  Tyloma.      Plan:   - Pt seen and evaluated  - Nails debrided x 10.   - Daily foot exams.   - Pt to return to clinic in 12 weeks or sooner if problems arise.

## 2022-03-15 NOTE — LETTER
3/15/2022         RE: Clarke Moreira  [address]  [facility] [id number]        Dear Colleague,    Thank you for referring your patient, Clarke Moreira, to the [facility] [place]. Please see a copy of my visit note below.    Chief Complaint   Patient presents with     Follow Up     9 week follow up.               Allergies   Allergen Reactions     Seasonal Allergies          Subjective: Clarke is a 71 year old male who presents to the clinic today for a diabetic foot ulcer. He relates that he has been doing well. He has no new open lesions.     Objective  Hemoglobin A1C POCT   Date Value Ref Range Status   05/06/2021 6.2 (H) 0 - 5.6 % Final     Comment:     Normal <5.7% Prediabetes 5.7-6.4%  Diabetes 6.5% or higher - adopted from ADA   consensus guidelines.     11/09/2020 5.9 (H) 4.1 - 5.7 % Final   07/09/2020 5.9 (H) 0 - 5.6 % Final     Comment:     Normal <5.7% Prediabetes 5.7-6.4%  Diabetes 6.5% or higher - adopted from ADA   consensus guidelines.         DP and PT pulses 1/4 BL. CRT 1 second. Absent pedal hair.  Gross and protective sensations are diminished BL.  Hammertoes to all lesser digits. Hallux malleus BL. Equinus BL. Pes planus.   Onychomycosis to toes x10.  With debridement of the right medial hallux nail, there was a sanguinous bulla subungually.  No signs or symptoms of infection.  Tyloma noted to the left 5th digit DIPJ, right dorsal hallux IPJ, and right 5th DIPJ  A diabetic wound is noted at left  dorsal hallux IPJ has healed. No s/s of acute infection noted. No drainage noted to the area. No pain with palpation.        Assessment: Clarke is a 68 YO male with DM2 and neuropathy.  Ulceration on the left dorsal hallux. Clinically, this has healed.   Onychomycosis  Tyloma.      Plan:   - Pt seen and evaluated  - Nails debrided x 10.   - Daily foot exams.   - Pt to return to clinic in 12 weeks or sooner if problems arise.       Jesús LIND  KIERRA Lagos

## 2022-03-15 NOTE — PROGRESS NOTES
ANTICOAGULATION MANAGEMENT     Clarke Moreira 71 year old male is on warfarin with subtherapeutic INR result. (Goal INR 2.0-3.0)    Recent labs: (last 7 days)     03/15/22  1116   INR 1.8*       ASSESSMENT       Source(s): Chart Review and Patient/Caregiver Call       Warfarin doses taken: Warfarin taken as instructed    Diet: Increased greens/vitamin K in diet; plans to resume previous intake    New illness, injury, or hospitalization: No    Medication/supplement changes: None noted    Signs or symptoms of bleeding or clotting: No    Previous INR: Therapeutic last 2(+) visits    Additional findings: None       PLAN     Recommended plan for temporary change(s) affecting INR     Dosing Instructions: Booster dose then continue your current warfarin dose with next INR in 2 weeks       Summary  As of 3/15/2022    Full warfarin instructions:  3/15: 15 mg; Otherwise 5 mg every Thu; 10 mg all other days   Next INR check:  3/29/2022             Telephone call with Clarke who verbalizes understanding and agrees to plan    Patient offered & declined to schedule next visit    Education provided: None required    Plan made per [facility] anticoagulation protocol    Robinson Lara RN  Anticoagulation Clinic  3/15/2022    _______________________________________________________________________     Anticoagulation Episode Summary     Current INR goal:  2.0-3.0   TTR:  88.8 % (7.4 mo)   Target end date:  Indefinite   Send INR reminders to:  [facility]    Indications    Chronic atrial fibrillation (H) [I48.20]           Comments:  Home Care         Anticoagulation Care Providers     Provider Role Specialty Phone number    Matthias Sanchez MD Referring Family Medicine [phone]

## 2022-03-24 ENCOUNTER — VIRTUAL VISIT (OUTPATIENT)
Dept: PSYCHOLOGY | Facility: CLINIC | Age: 72
End: 2022-03-24
Payer: COMMERCIAL

## 2022-03-24 DIAGNOSIS — F33.1 MODERATE EPISODE OF RECURRENT MAJOR DEPRESSIVE DISORDER (H): Primary | ICD-10-CM

## 2022-03-24 DIAGNOSIS — F41.1 GAD (GENERALIZED ANXIETY DISORDER): ICD-10-CM

## 2022-03-24 PROCEDURE — 90834 PSYTX W PT 45 MINUTES: CPT | Mod: TEL | Performed by: PSYCHOLOGIST

## 2022-03-24 NOTE — PROGRESS NOTES
"The following statement was read to the patient at the outset of this visit:     \"We have found that certain health care needs can be provided without the need for a face to face visit.  This service lets us provide the care you need with a phone conversation. I will have full access to your [facility] medical record during this entire phone call.   I will be taking notes for your medical record.  Since this is like an office visit, we will bill your insurance company for this service. There are potential benefits and risks of telephone visits (e.g. limits to patient confidentiality) that differ from in-person visits.?  Confidentiality still applies for telephone services, and nobody will record the visit.  It is important to be in a quiet, private space that is free of distractions (including cell phone or other devices) during the visit.??If during the course of the call I believe a telephone visit is not appropriate, you will not be charged for this service.\"     Consent has been obtained for this service by care team member: Yes     Emergency contact: Janice Hillman [phone]  [facility] Emergency Room: [place]  Location at time of call: home    Behavioral Health Progress Note    Client's Legal Name: Clarke Moreira    Client's Preferred Name: Clarke  YOB: 1950  Type of Service: telephone, counseling  Length of Service:   Start time: 8:20  End time: 9:10  Duration: 50 minutes  Attendees: patient    Identifying Information and Presenting Problem:    The patient is a 71 year old American male who I have seen off and on for therapy over the past couple of years.  Clarke did have difficulties at times coming into clinic for appointments as the anxiety would often lead him to cancel appointments.  Since the pandemic started and we have been doing phone visits, Clarke reports feeling much more comfortable in all of his appointments when they are done via telephone than when he is in person.  " In person appointments with all providers have always caused significant distress due to his desire to be perceived in a particular way.  On the telephone, he feels as if he can better articulate his experience and what he is thinking as he feels less need to project a particular image to those that are caring for him.  Anxiety has kept Clarke from seeking medical care that he needs when he notices exacerbations of conditions.  We have been focused on increasing skills to cope with anxiety so that he can stay appropriately engaged in needed healthcare, as well as continuing to challenge very embedded negative core beliefs about himself.     Treatment Objective(s) Addressed in This Session:    Clarke will practice a healthy daily discipline of diet and exercise.  Clarke will learn to manage anxiety so he can make and keep appointments     Progress on / Status of Treatment Objective(s) / Homework:      PHQ-9:   PHQ 11/9/2020 1/6/2020 9/3/2019   PHQ-9 Total Score 22 21 25   Q9: Thoughts of better off dead/self-harm past 2 weeks More than half the days Several days Nearly every day   F/U: Thoughts of suicide or self-harm - No -   F/U: Safety concerns - No -      CHRIS-7:   CHRIS-7 SCORE 9/3/2019 5/10/2019 12/11/2018   Total Score - - -   Total Score 19 13 20     Topics Discussed/Interventions Provided:  Disheartened and frustrated by the events taking place right now.      Has .  Has been receiving some prepared meals and this has been really helpful.      Clarke described people that he has an immense amount of admiration for. It is difficult for it to just be admiration though, as it turns into proof of how unworthy he is himself.  Session focused on identifying emotions, which is primarily shame that comes up in these situations and the unhelpful thoughts that subsequently occur.  Clarke proposed that he needs to feel more worthy and then he will be able to engage in activities with other people.  Shared  that I don't think that is a workable approach.  Proposed that he will need to participate in the activities despite the difficult thoughts and feelings and this will give him the opportunity for other experiences which then may lead to feeling more worthy.  Clarke states that this feels impossible. Asked him to allow some space to sit with with this idea for awhile rather then rejecting it outright.    Assessment:   The patient appeared to be active and engaged in today's session and was receptive to feedback.     Mental Status:   During our visit today, Clarke was engaged, pleasant and respectful. He was alert and oriented to person, place, time, and situation.  Unable to assess appearance as this is a phone visit.  Speech was normal rate and rhythm.  Mood was down and appropriate to stated mood.  Thought processes were logical and coherent. Thought content included self-deprecating thoughts and hopelessness.  Has thoughts that he wishes he wouldn't wake up or be here at times, but denies any intent or plan to harm himself.  Memory appeared grossly intact without being formally evaluated. Insight and judgment are good.  Patient exhibited good impulse control during the appointment.     Does the patient appear to be at imminent risk of harm to self/others at this time? No    The session was necessary to challenge thoughts of being unworthy and encourage skills of sitting with negative thoughts/feelings, but engaging in the desired activity anyway.  Ongoing depressive symptoms and low self-view continue to interfere with patient's ability to function in areas related to social relationships day to day self care or health behaviors chronic disease management household management. Ongoing psychotherapy is necessary to stabilize mood, provide counseling, improve functioning with daily activities and provide support.    DSM-5 Diagnosis:  Major Depression, recurrent, moderate  Generalized Anxiety Disorder  R/o  persistent depressive disorder    Plan:  - f/up on 4/7  - update diagnostic assessment and treatment plan      Lety Buenrostro PsyD    NOTE: Treatment plan update needed on 3/10/21.  Diagnostic assessment update due 10/15/19.

## 2022-03-28 DIAGNOSIS — G47.33 OSA (OBSTRUCTIVE SLEEP APNEA): Primary | ICD-10-CM

## 2022-03-29 DIAGNOSIS — E11.9 TYPE 2 DIABETES MELLITUS WITHOUT COMPLICATION, WITHOUT LONG-TERM CURRENT USE OF INSULIN (H): ICD-10-CM

## 2022-03-29 RX ORDER — ATORVASTATIN CALCIUM 40 MG/1
40 TABLET, FILM COATED ORAL DAILY
Qty: 90 TABLET | Refills: 3 | Status: SHIPPED | OUTPATIENT
Start: 2022-03-29 | End: 2023-03-13

## 2022-03-29 NOTE — TELEPHONE ENCOUNTER
"Request for medication refill:  atorvastatin (LIPITOR) 40 MG tablet    Providers if patient needs an appointment and you are willing to give a one month supply please refill for one month and  send a letter/MyChart using \".SMILLIMITEDREFILL\" .smillimited and route chart to \"P SMI \" (Giving one month refill in non controlled medications is strongly recommended before denial)    If refill has been denied, meaning absolutely no refills without visit, please complete the smart phrase \".smirxrefuse\" and route it to the \"P SMI MED REFILLS\"  pool to inform the patient and the pharmacy.    Jessie Pedroza        "

## 2022-03-29 NOTE — TELEPHONE ENCOUNTER
"Request for medication refill:  metFORMIN (GLUCOPHAGE) 500 MG tablet    Providers if patient needs an appointment and you are willing to give a one month supply please refill for one month and  send a letter/MyChart using \".SMILLIMITEDREFILL\" .smillimited and route chart to \"P SMI \" (Giving one month refill in non controlled medications is strongly recommended before denial)    If refill has been denied, meaning absolutely no refills without visit, please complete the smart phrase \".smirxrefuse\" and route it to the \"P SMI MED REFILLS\"  pool to inform the patient and the pharmacy.    Jessie Pedroza        "

## 2022-03-29 NOTE — TELEPHONE ENCOUNTER
"Request for medication refill:  metoprolol succinate ER (TOPROL-XL) 100 MG 24 hr tablet    Providers if patient needs an appointment and you are willing to give a one month supply please refill for one month and  send a letter/MyChart using \".SMILLIMITEDREFILL\" .smillimited and route chart to \"P SMI \" (Giving one month refill in non controlled medications is strongly recommended before denial)    If refill has been denied, meaning absolutely no refills without visit, please complete the smart phrase \".smirxrefuse\" and route it to the \"P SMI MED REFILLS\"  pool to inform the patient and the pharmacy.    Jessie Pedroza        "

## 2022-04-05 ENCOUNTER — DOCUMENTATION ONLY (OUTPATIENT)
Dept: ANTICOAGULATION | Facility: CLINIC | Age: 72
End: 2022-04-05
Payer: COMMERCIAL

## 2022-04-05 DIAGNOSIS — I48.20 CHRONIC ATRIAL FIBRILLATION (H): Primary | ICD-10-CM

## 2022-04-05 NOTE — PROGRESS NOTES
ANTICOAGULATION MANAGEMENT      Clarke Moreira due for annual renewal of referral to anticoagulation monitoring. Order pended for your review and signature.      ANTICOAGULATION SUMMARY      Warfarin indication(s)     Atrial fibrillation    Heart valve present?  NO       Current goal range   INR: 2.0-3.0     Goal appropriate for indication? Yes, INR 2-3 appropriate for hx of DVT, PE, hypercoagulable state, Afib, LVAD, or bileaflet AVR without risk factors     Current duration of therapy Indefinite/long term therapy   Time in Therapeutic Range (TTR)  (Goal > 60%) 88.8%       Office visit with referring provider's group within last year yes on 6/21/21       Negrita Walsh RN

## 2022-04-06 ENCOUNTER — TELEPHONE (OUTPATIENT)
Dept: ANTICOAGULATION | Facility: CLINIC | Age: 72
End: 2022-04-06
Payer: COMMERCIAL

## 2022-04-06 NOTE — TELEPHONE ENCOUNTER
ANTICOAGULATION     Clarke Moreira is overdue for INR check.      spoke with Clarke who declined to schedule a lab appointment at this time. If calling back please schedule as soon as possible.     He is hoping to schedule with PCP next week. If not, states he will schedule at [facility] lab for an INR.     Laurel Mcneal RN

## 2022-04-20 NOTE — TELEPHONE ENCOUNTER
Returned call to home care nurse, unable to reach. Left VM with verbal orders per protocol as requested. Left callback number for questions.    Elena Lora RN       never

## 2022-04-21 ENCOUNTER — VIRTUAL VISIT (OUTPATIENT)
Dept: PSYCHOLOGY | Facility: CLINIC | Age: 72
End: 2022-04-21
Payer: COMMERCIAL

## 2022-04-21 DIAGNOSIS — F41.1 GAD (GENERALIZED ANXIETY DISORDER): ICD-10-CM

## 2022-04-21 DIAGNOSIS — F33.1 MODERATE EPISODE OF RECURRENT MAJOR DEPRESSIVE DISORDER (H): Primary | ICD-10-CM

## 2022-04-21 PROCEDURE — 90834 PSYTX W PT 45 MINUTES: CPT | Mod: 95 | Performed by: PSYCHOLOGIST

## 2022-04-21 NOTE — PROGRESS NOTES
"The following statement was read to the patient at the outset of this visit:     \"We have found that certain health care needs can be provided without the need for a face to face visit.  This service lets us provide the care you need with a phone conversation. I will have full access to your [facility] medical record during this entire phone call.   I will be taking notes for your medical record.  Since this is like an office visit, we will bill your insurance company for this service. There are potential benefits and risks of telephone visits (e.g. limits to patient confidentiality) that differ from in-person visits.?  Confidentiality still applies for telephone services, and nobody will record the visit.  It is important to be in a quiet, private space that is free of distractions (including cell phone or other devices) during the visit.??If during the course of the call I believe a telephone visit is not appropriate, you will not be charged for this service.\"     Consent has been obtained for this service by care team member: Yes     Emergency contact: Janice Hillman [phone]  [facility] Emergency Room: [place]  Location at time of call: home    Behavioral Health Progress Note    Client's Legal Name: Clarke Moreira    Client's Preferred Name: Clarke  YOB: 1950  Type of Service: telephone, counseling  Length of Service:   Start time: 9:01  End time: 9:41  Duration: 40 minutes  Attendees: patient    Identifying Information and Presenting Problem:    The patient is a 71 year old American male who I have seen off and on for therapy over the past couple of years.  Clarke did have difficulties at times coming into clinic for appointments as the anxiety would often lead him to cancel appointments.  Since the pandemic started and we have been doing phone visits, Clarke reports feeling much more comfortable in all of his appointments when they are done via telephone than when he is in person.  " "In person appointments with all providers have always caused significant distress due to his desire to be perceived in a particular way.  On the telephone, he feels as if he can better articulate his experience and what he is thinking as he feels less need to project a particular image to those that are caring for him.  Anxiety has kept Clarke from seeking medical care that he needs when he notices exacerbations of conditions.  We have been focused on increasing skills to cope with anxiety so that he can stay appropriately engaged in needed healthcare, as well as continuing to challenge very embedded negative core beliefs about himself.     Treatment Objective(s) Addressed in This Session:    Clarke will practice a healthy daily discipline of diet and exercise.  Clarke will learn to manage anxiety so he can make and keep appointments     Progress on / Status of Treatment Objective(s) / Homework:  Symptoms are the same    PHQ-9:   PHQ 11/9/2020 1/6/2020 9/3/2019   PHQ-9 Total Score 22 21 25   Q9: Thoughts of better off dead/self-harm past 2 weeks More than half the days Several days Nearly every day   F/U: Thoughts of suicide or self-harm - No -   F/U: Safety concerns - No -      CHRIS-7:   CHRIS-7 SCORE 9/3/2019 5/10/2019 12/11/2018   Total Score - - -   Total Score 19 13 20     Topics Discussed/Interventions Provided:  Clarke shares a situation that occurred with someone calling him trying to get him to agree for them to come over to talk about \"the info on the card we sent you.\"  Didn't recognize the person on the phone when they called back a second time and just started talking.  Realized who it was and became very angry, then felt embarassed.  Clarke wanted to focus on these emotions today as this has been sticking with him.  Identified that the anger may be from his desire to connect with people and having this conversation and then realizing this wasn't someone who knew and was likely someone trying to take " "advantage of him.  Felt vulnerable and ashamed that he did not figure it out right away.  Normalized his reaction to this situation, identified unhelpful thoughts he is having, gently, challenged these thoughts, and worked to identify other ways to frame this situation.     Assessment:   The patient appeared to be active and engaged in today's session and was receptive to feedback.     Mental Status:   During our visit today, Clarke was engaged, pleasant and respectful. He was alert and oriented to person, place, time, and situation.  Unable to assess appearance as this is a phone visit.  Speech was upbeat, normal rate and rhythm.  Mood was \"stuck\" and appropriate to stated mood.  Thought processes were logical and coherent. Thought content included self-deprecating thoughts and hopelessness.  Has thoughts that he wishes he wouldn't wake up or be here at times, but denies any intent or plan to harm himself.  Memory appeared grossly intact without being formally evaluated. Insight and judgment are good.  Patient exhibited good impulse control during the appointment.     Does the patient appear to be at imminent risk of harm to self/others at this time? No    The session was necessary to challenge unhelpful thoughts.  Ongoing depressive symptoms and low self-view continue to interfere with patient's ability to function in areas related to social relationships day to day self care or health behaviors chronic disease management household management. Ongoing psychotherapy is necessary to stabilize mood, provide counseling, improve functioning with daily activities and provide support.    DSM-5 Diagnosis:  Major Depression, recurrent, moderate  Generalized Anxiety Disorder  R/o persistent depressive disorder    Plan:  - f/up on 5/5  - followed up on recommendation to make PCP appt and has appt with Dr. Sanchez on 5/9 - YEAH!!  - update diagnostic assessment and treatment plan      Lety Buenrostro PsyD    NOTE: " Treatment plan update needed on 3/10/21.  Diagnostic assessment update due 10/15/19.

## 2022-05-05 ENCOUNTER — VIRTUAL VISIT (OUTPATIENT)
Dept: PSYCHOLOGY | Facility: CLINIC | Age: 72
End: 2022-05-05
Payer: COMMERCIAL

## 2022-05-05 DIAGNOSIS — F41.1 GAD (GENERALIZED ANXIETY DISORDER): ICD-10-CM

## 2022-05-05 DIAGNOSIS — F33.1 MODERATE EPISODE OF RECURRENT MAJOR DEPRESSIVE DISORDER (H): Primary | ICD-10-CM

## 2022-05-05 PROCEDURE — 90834 PSYTX W PT 45 MINUTES: CPT | Mod: 95 | Performed by: PSYCHOLOGIST

## 2022-05-05 NOTE — PROGRESS NOTES
"The following statement was read to the patient at the outset of this visit:     \"We have found that certain health care needs can be provided without the need for a face to face visit.  This service lets us provide the care you need with a phone conversation. I will have full access to your [facility] medical record during this entire phone call.   I will be taking notes for your medical record.  Since this is like an office visit, we will bill your insurance company for this service. There are potential benefits and risks of telephone visits (e.g. limits to patient confidentiality) that differ from in-person visits.?  Confidentiality still applies for telephone services, and nobody will record the visit.  It is important to be in a quiet, private space that is free of distractions (including cell phone or other devices) during the visit.??If during the course of the call I believe a telephone visit is not appropriate, you will not be charged for this service.\"     Consent has been obtained for this service by care team member: Yes     Emergency contact: Janice Hillman [phone]  [facility] Emergency Room: [place]  Location at time of call: home    Behavioral Health Progress Note    Client's Legal Name: Clarke Moreira    Client's Preferred Name: Clarke  YOB: 1950  Type of Service: telephone, counseling  Length of Service:   Start time: 9:04  End time: 9:45  Duration: 41 minutes  Attendees: patient    Identifying Information and Presenting Problem:    The patient is a 71 year old American male who I have seen off and on for therapy over the past couple of years.  Clarke did have difficulties at times coming into clinic for appointments as the anxiety would often lead him to cancel appointments.  Since the pandemic started and we have been doing phone visits, Clarke reports feeling much more comfortable in all of his appointments when they are done via telephone than when he is in person.  " In person appointments with all providers have always caused significant distress due to his desire to be perceived in a particular way.  On the telephone, he feels as if he can better articulate his experience and what he is thinking as he feels less need to project a particular image to those that are caring for him.  Anxiety has kept Clarke from seeking medical care that he needs when he notices exacerbations of conditions.  We have been focused on increasing skills to cope with anxiety so that he can stay appropriately engaged in needed healthcare, as well as continuing to challenge very embedded negative core beliefs about himself.     Treatment Objective(s) Addressed in This Session:    Clarke will practice a healthy daily discipline of diet and exercise.  Clarke will learn to manage anxiety so he can make and keep appointments     Progress on / Status of Treatment Objective(s) / Homework:  Symptoms are the same    PHQ-9:   PHQ 11/9/2020 1/6/2020 9/3/2019   PHQ-9 Total Score 22 21 25   Q9: Thoughts of better off dead/self-harm past 2 weeks More than half the days Several days Nearly every day   F/U: Thoughts of suicide or self-harm - No -   F/U: Safety concerns - No -      CHRIS-7:   CHRIS-7 SCORE 9/3/2019 5/10/2019 12/11/2018   Total Score - - -   Total Score 19 13 20     Topics Discussed/Interventions Provided:  Clarke shares that he was having many thoughts about cancelling today. We examined these thoughts more closely to identify what was contributing to them.  Sees himself as hopeless and that nothing will likely help him change his patterns. Also, enjoys when he does have the session and feels better afterwards.  As time goes on, starts to feel more down and hopeless again and this change in mood makes him feel like it may be better just not to have a time of feeling better at all, so he doesn't have to experience the difference.      At Clarke's request, session focused on his self-esteem.  Was  relating to his thoughts/perceptions of past events in his life to some of the recent political/legal events that are taking place.  Using a feminist therapy framework, challenged some of the thoughts he was having about himself and explored how those thoughts contribute to the paternalism in our society that he does not support.  This intervention was well-received by Clarke and helped him to view his role and view of himself differently.    Assessment:   The patient appeared to be active and engaged in today's session and was receptive to feedback.     Mental Status:   During our visit today, Clarke was engaged, pleasant and respectful. He was alert and oriented to person, place, time, and situation.  Unable to assess appearance as this is a phone visit.  Speech was normal rate and rhythm.  Mood was down and appropriate to stated mood.  Thought processes were logical and coherent. Thought content included self-deprecating thoughts and hopelessness.  Has thoughts that he wishes he wouldn't wake up or be here at times, but denies any intent or plan to harm himself.  Memory appeared grossly intact without being formally evaluated. Insight and judgment are good.  Patient exhibited good impulse control during the appointment.     Does the patient appear to be at imminent risk of harm to self/others at this time? No    The session was necessary to challenge unhelpful thoughts.  Ongoing depressive symptoms and low self-view continue to interfere with patient's ability to function in areas related to social relationships day to day self care or health behaviors chronic disease management household management. Ongoing psychotherapy is necessary to stabilize mood, provide counseling, improve functioning with daily activities and provide support.    DSM-5 Diagnosis:  Major Depression, recurrent, moderate  Generalized Anxiety Disorder  R/o persistent depressive disorder    Plan:  - f/up on 5/19  - saw  on 5/9  -  update diagnostic assessment and treatment plan      Lety Buenrostro PsyD    NOTE: Treatment plan update needed on 3/10/21.  Diagnostic assessment update due 10/15/19.

## 2022-05-06 ENCOUNTER — TELEPHONE (OUTPATIENT)
Dept: PHARMACY | Facility: CLINIC | Age: 72
End: 2022-05-06
Payer: COMMERCIAL

## 2022-05-06 NOTE — TELEPHONE ENCOUNTER
PHARMACY TELEPHONE ENCOUNTER:    Reason: INR reminder      Called to discuss INR follow up. Pt plans to see PCP next week and will have INR done at that time.  We discussed travel to the clinic as a barrier.  Home INR kits may be an option for the patient.  Recommend that the patient brings this up with the anticoagulation clinic if this is of interest.    Patient confirms that he plans to come to [facility] for lab follow-up next week.    Tj Palumbo Pharm.D.

## 2022-05-09 ENCOUNTER — ANTICOAGULATION THERAPY VISIT (OUTPATIENT)
Dept: ANTICOAGULATION | Facility: CLINIC | Age: 72
End: 2022-05-09

## 2022-05-09 ENCOUNTER — OFFICE VISIT (OUTPATIENT)
Dept: FAMILY MEDICINE | Facility: CLINIC | Age: 72
End: 2022-05-09
Payer: COMMERCIAL

## 2022-05-09 VITALS
HEIGHT: 72 IN | OXYGEN SATURATION: 96 % | SYSTOLIC BLOOD PRESSURE: 107 MMHG | BODY MASS INDEX: 42.66 KG/M2 | TEMPERATURE: 98.5 F | RESPIRATION RATE: 18 BRPM | WEIGHT: 315 LBS | DIASTOLIC BLOOD PRESSURE: 72 MMHG | HEART RATE: 91 BPM

## 2022-05-09 DIAGNOSIS — M35.1 OTHER OVERLAP SYNDROMES (H): ICD-10-CM

## 2022-05-09 DIAGNOSIS — L97.519 DIABETIC ULCER OF RIGHT FOOT DUE TO TYPE 2 DIABETES MELLITUS (H): ICD-10-CM

## 2022-05-09 DIAGNOSIS — M86.9 OSTEOMYELITIS OF GREAT TOE OF LEFT FOOT (H): ICD-10-CM

## 2022-05-09 DIAGNOSIS — E66.01 MORBID (SEVERE) OBESITY DUE TO EXCESS CALORIES (H): ICD-10-CM

## 2022-05-09 DIAGNOSIS — I50.30 HEART FAILURE WITH PRESERVED EJECTION FRACTION, UNSPECIFIED HF CHRONICITY (H): ICD-10-CM

## 2022-05-09 DIAGNOSIS — I50.32 CHRONIC HEART FAILURE WITH PRESERVED EJECTION FRACTION (H): ICD-10-CM

## 2022-05-09 DIAGNOSIS — E11.9 TYPE 2 DIABETES MELLITUS WITHOUT COMPLICATION, WITHOUT LONG-TERM CURRENT USE OF INSULIN (H): Primary | ICD-10-CM

## 2022-05-09 DIAGNOSIS — G63 POLYNEUROPATHY ASSOCIATED WITH UNDERLYING DISEASE (H): ICD-10-CM

## 2022-05-09 DIAGNOSIS — E11.621 DIABETIC ULCER OF RIGHT FOOT DUE TO TYPE 2 DIABETES MELLITUS (H): ICD-10-CM

## 2022-05-09 DIAGNOSIS — I73.9 PAD (PERIPHERAL ARTERY DISEASE) (H): ICD-10-CM

## 2022-05-09 DIAGNOSIS — I48.0 PAROXYSMAL ATRIAL FIBRILLATION (H): ICD-10-CM

## 2022-05-09 DIAGNOSIS — E78.5 HYPERLIPIDEMIA LDL GOAL <100: ICD-10-CM

## 2022-05-09 DIAGNOSIS — I48.20 CHRONIC ATRIAL FIBRILLATION (H): ICD-10-CM

## 2022-05-09 DIAGNOSIS — Z23 HIGH PRIORITY FOR 2019-NCOV VACCINE: ICD-10-CM

## 2022-05-09 DIAGNOSIS — E03.9 HYPOTHYROIDISM, UNSPECIFIED TYPE: ICD-10-CM

## 2022-05-09 DIAGNOSIS — L85.3 DRY SKIN: ICD-10-CM

## 2022-05-09 DIAGNOSIS — J96.12 CHRONIC HYPERCAPNIC RESPIRATORY FAILURE (H): ICD-10-CM

## 2022-05-09 DIAGNOSIS — N18.31 STAGE 3A CHRONIC KIDNEY DISEASE (H): ICD-10-CM

## 2022-05-09 DIAGNOSIS — R33.9 URINARY RETENTION: ICD-10-CM

## 2022-05-09 DIAGNOSIS — I48.19 PERSISTENT ATRIAL FIBRILLATION (H): ICD-10-CM

## 2022-05-09 DIAGNOSIS — I48.20 CHRONIC ATRIAL FIBRILLATION (H): Primary | ICD-10-CM

## 2022-05-09 LAB
ALBUMIN SERPL-MCNC: 3.4 G/DL (ref 3.4–5)
ALP SERPL-CCNC: 108 U/L (ref 40–150)
ALT SERPL W P-5'-P-CCNC: 19 U/L (ref 0–70)
ANION GAP SERPL CALCULATED.3IONS-SCNC: 6 MMOL/L (ref 3–14)
AST SERPL W P-5'-P-CCNC: 17 U/L (ref 0–45)
BILIRUB SERPL-MCNC: 0.6 MG/DL (ref 0.2–1.3)
BUN SERPL-MCNC: 20 MG/DL (ref 7–30)
CALCIUM SERPL-MCNC: 9.2 MG/DL (ref 8.5–10.1)
CHLORIDE BLD-SCNC: 101 MMOL/L (ref 94–109)
CHOLEST SERPL-MCNC: 122 MG/DL
CO2 SERPL-SCNC: 30 MMOL/L (ref 20–32)
CREAT SERPL-MCNC: 1.18 MG/DL (ref 0.66–1.25)
FASTING STATUS PATIENT QL REPORTED: ABNORMAL
GFR SERPL CREATININE-BSD FRML MDRD: 66 ML/MIN/1.73M2
GLUCOSE BLD-MCNC: 141 MG/DL (ref 70–99)
HBA1C MFR BLD: 6.6 % (ref 0–5.6)
HDLC SERPL-MCNC: 38 MG/DL
INR PPP: 2.01 (ref 0.85–1.15)
LDLC SERPL CALC-MCNC: 51 MG/DL
NONHDLC SERPL-MCNC: 84 MG/DL
NT-PROBNP SERPL-MCNC: 538 PG/ML (ref 0–900)
POTASSIUM BLD-SCNC: 3.9 MMOL/L (ref 3.4–5.3)
PROT SERPL-MCNC: 7.7 G/DL (ref 6.8–8.8)
SODIUM SERPL-SCNC: 137 MMOL/L (ref 133–144)
TRIGL SERPL-MCNC: 164 MG/DL
TSH SERPL DL<=0.005 MIU/L-ACNC: 3.98 MU/L (ref 0.4–4)

## 2022-05-09 PROCEDURE — 91306 COVID-19,PF,MODERNA (18+ YRS BOOSTER .25ML): CPT | Performed by: FAMILY MEDICINE

## 2022-05-09 PROCEDURE — 0064A COVID-19,PF,MODERNA (18+ YRS BOOSTER .25ML): CPT | Performed by: FAMILY MEDICINE

## 2022-05-09 PROCEDURE — 36415 COLL VENOUS BLD VENIPUNCTURE: CPT | Performed by: FAMILY MEDICINE

## 2022-05-09 PROCEDURE — 80061 LIPID PANEL: CPT | Performed by: FAMILY MEDICINE

## 2022-05-09 PROCEDURE — 84443 ASSAY THYROID STIM HORMONE: CPT | Performed by: FAMILY MEDICINE

## 2022-05-09 PROCEDURE — 85610 PROTHROMBIN TIME: CPT | Performed by: FAMILY MEDICINE

## 2022-05-09 PROCEDURE — 99215 OFFICE O/P EST HI 40 MIN: CPT | Mod: 25 | Performed by: FAMILY MEDICINE

## 2022-05-09 PROCEDURE — 80053 COMPREHEN METABOLIC PANEL: CPT | Performed by: FAMILY MEDICINE

## 2022-05-09 PROCEDURE — 83880 ASSAY OF NATRIURETIC PEPTIDE: CPT | Performed by: FAMILY MEDICINE

## 2022-05-09 PROCEDURE — 83036 HEMOGLOBIN GLYCOSYLATED A1C: CPT | Performed by: FAMILY MEDICINE

## 2022-05-09 RX ORDER — OMEGA-3 FATTY ACIDS/FISH OIL 300-1000MG
1 CAPSULE ORAL DAILY
Qty: 90 CAPSULE | Refills: 3 | Status: SHIPPED | OUTPATIENT
Start: 2022-05-09 | End: 2023-06-20

## 2022-05-09 RX ORDER — BACITRACIN ZINC 500 [USP'U]/G
30 OINTMENT TOPICAL 2 TIMES DAILY
Qty: 30 G | Refills: 1 | Status: SHIPPED | OUTPATIENT
Start: 2022-05-09 | End: 2023-09-13

## 2022-05-09 RX ORDER — WARFARIN SODIUM 5 MG/1
TABLET ORAL
Qty: 180 TABLET | Refills: 1 | Status: SHIPPED | OUTPATIENT
Start: 2022-05-09 | End: 2023-03-21

## 2022-05-09 RX ORDER — LEVOTHYROXINE SODIUM 175 UG/1
175 TABLET ORAL
Qty: 90 TABLET | Refills: 3 | Status: SHIPPED | OUTPATIENT
Start: 2022-05-09 | End: 2023-06-20

## 2022-05-09 RX ORDER — SPIRONOLACTONE 25 MG/1
50 TABLET ORAL DAILY
Qty: 180 TABLET | Refills: 3 | Status: SHIPPED | OUTPATIENT
Start: 2022-05-09 | End: 2023-06-20

## 2022-05-09 RX ORDER — METOPROLOL SUCCINATE 100 MG/1
100 TABLET, EXTENDED RELEASE ORAL DAILY
Qty: 90 TABLET | Refills: 3 | Status: SHIPPED | OUTPATIENT
Start: 2022-05-09 | End: 2023-05-05

## 2022-05-09 RX ORDER — TORSEMIDE 100 MG/1
100 TABLET ORAL DAILY
Qty: 90 TABLET | Refills: 3 | Status: SHIPPED | OUTPATIENT
Start: 2022-05-09 | End: 2023-05-05

## 2022-05-09 RX ORDER — AMMONIUM LACTATE 12 G/100G
CREAM TOPICAL 2 TIMES DAILY PRN
Qty: 385 G | Refills: 11 | Status: SHIPPED | OUTPATIENT
Start: 2022-05-09

## 2022-05-09 RX ORDER — MULTIPLE VITAMINS W/ MINERALS TAB 9MG-400MCG
1 TAB ORAL DAILY
Qty: 100 TABLET | Refills: 3 | Status: SHIPPED | OUTPATIENT
Start: 2022-05-09 | End: 2023-06-20

## 2022-05-09 RX ORDER — TAMSULOSIN HYDROCHLORIDE 0.4 MG/1
0.4 CAPSULE ORAL DAILY
Qty: 90 CAPSULE | Refills: 3 | Status: SHIPPED | OUTPATIENT
Start: 2022-05-09 | End: 2023-05-05

## 2022-05-09 RX ORDER — NYSTATIN 100000 U/G
CREAM TOPICAL 2 TIMES DAILY PRN
Qty: 30 G | Refills: 11 | Status: SHIPPED | OUTPATIENT
Start: 2022-05-09

## 2022-05-09 RX ORDER — POTASSIUM CHLORIDE 1500 MG/1
40 TABLET, EXTENDED RELEASE ORAL 2 TIMES DAILY
Qty: 360 TABLET | Refills: 3 | Status: SHIPPED | OUTPATIENT
Start: 2022-05-09 | End: 2023-05-05

## 2022-05-09 NOTE — PROGRESS NOTES
Assessment & Plan     Type 2 diabetes mellitus without complication, without long-term current use of insulin (H)  a1c- 6.6  Continue present regiment    - Hemoglobin A1c; Future  - multivitamin w/minerals (MULTI-VITAMIN) tablet; Take 1 tablet by mouth daily  - blood glucose (NO BRAND SPECIFIED) test strip; Use to test blood sugar 2 times daily or as directed.  - Hemoglobin A1c    Persistent atrial fibrillation (H)  Paroxysmal atrial fibrillation (H)  Chronic atrial fibrillation (H)  Last INR 1.9  Check again today    - warfarin ANTICOAGULANT (COUMADIN) 5 MG tablet; Take 5 mg (5 mg x 1) every Thu; 10 mg (5 mg x 2) all other days or as directed by the INR clinic.  - INR; Future  - INR  - metoprolol succinate ER (TOPROL XL) 100 MG 24 hr tablet; Take 1 tablet (100 mg) by mouth daily    Heart failure with preserved ejection fraction, unspecified HF chronicity (H)  Chronic heart failure with preserved ejection fraction (H)  Wt gain  Breathing slightly off  Exam normal  Check BNP    - spironolactone (ALDACTONE) 25 MG tablet; Take 2 tablets (50 mg) by mouth daily  - N terminal pro BNP outpatient; Future  - potassium chloride ER (KLOR-CON M) 20 MEQ CR tablet; Take 2 tablets (40 mEq) by mouth 2 times daily  - torsemide (DEMADEX) 100 MG tablet; Take 1 tablet (100 mg) by mouth daily  - N terminal pro BNP outpatient    Hypothyroidism, unspecified type  - TSH with free T4 reflex; Future  - levothyroxine (SYNTHROID/LEVOTHROID) 175 MCG tablet; Take 1 tablet (175 mcg) by mouth every morning (before breakfast)  - TSH with free T4 reflex    Hyperlipidemia LDL goal <100  - Comprehensive metabolic panel; Future  - Lipid panel; Future  - omega 3 1000 MG CAPS; Take 1 g by mouth daily  - Comprehensive metabolic panel  - Lipid panel    Osteomyelitis of great toe of left foot (H)  Diabetic ulcer of right foot due to type 2 diabetes mellitus (H)  Improved.    PAD (peripheral artery disease) (H)  Impacting healing of diabetic  "ulcer/osteo    Polyneuropathy associated with underlying disease (H)  Impacting diabetes ulcer    Chronic hypercapnic respiratory failure (H)  Stable. Breathing OK    Morbid (severe) obesity due to excess calories (H)  Gained more wt - over 400lbs  Check BNP to make sure not fluid    Stage 3a chronic kidney disease (H)  BMP today    Refills x 1 year  Urinary retention / BPH  - tamsulosin (FLOMAX) 0.4 MG capsule; Take 1 capsule (0.4 mg) by mouth daily  Dry skin  - ammonium lactate (LAC-HYDRIN) 12 % external cream; Apply topically 2 times daily as needed for dry skin  - nystatin (MYCOSTATIN) 039039 UNIT/GM external cream; Apply topically 2 times daily as needed for dry skin    High priority for 2019-nCoV vaccine  - COVID-19,PF,MODERNA (18+ Yrs BOOSTER .25mL)      >50 minutes spent on the date of the encounter doing chart review, history and exam, documentation and further activities per the note       Matthias Sanchez MD  [facility] ANGEL Mir is a 71 year old who presents for the following health issues     HPI     INR  Needing to get regular INR  Haven't gotten there sometimes - [employer] doesn't show ujp, weather  anticoag clinic upset with him  Will get INR today    Dry skin/heels  Unable to afford PCA  Will try towel to put lotion in and apply by himself    Meds  Needs many refills      HF/Afib - prizker   Has gained wt  Needs to be slightly upright in bed  Will check BNP  Afib controlled - check INR today      Toe - [place]field   Stable    Lymphedema  Controlled with compression stockings    Anxiety - ward   See Dr Buenrostro regularly    Covid vaccine  Would like    Shingles vaccine  Discussed.             Review of Systems         Objective    /72   Pulse 91   Temp 98.5  F (36.9  C) (Oral)   Resp 18   Ht 1.829 m (6' 0.01\")   Wt (!) 183.3 kg (404 lb)   SpO2 96%   BMI 54.78 kg/m    Body mass index is 54.78 kg/m .     Wt Readings from Last 5 Encounters: "   05/09/22 (!) 183.3 kg (404 lb)   12/02/21 (!) 181.4 kg (400 lb)   06/21/21 (!) 175.7 kg (387 lb 6.4 oz)   11/09/20 (!) 180.1 kg (397 lb)   11/07/19 (!) 168.8 kg (372 lb 1.6 oz)         Physical Exam   GENERAL: healthy, alert and no distress  RESP: lungs clear to auscultation - no rales, rhonchi or wheezes  CV: irregular rhythm, normal S1 S2, no S3 or S4, no murmur, click or rub, no peripheral edema and peripheral pulses strong  ABDOMEN: soft, nontender, no hepatosplenomegaly, no masses and bowel sounds normal  MS: compression stockings on, litte edema  PSYCH: mentation appears normal, affect normal/bright

## 2022-05-09 NOTE — LETTER
May 16, 2022      Clarke Moreira  [address]  [place] [address]        Dear Clarke,    Thank you for getting your care at [facility]. Good to see you in clinic.  Please see below for your test results.  Everything looks really good!!  Thyroid is normal  A1c (diabetes) is decent  Liver and kidneys are good  Cholesterol good  BNP (heart failure) - best it has been in a long time    Resulted Orders   TSH with free T4 reflex   Result Value Ref Range    TSH 3.98 0.40 - 4.00 mU/L   Hemoglobin A1c   Result Value Ref Range    Hemoglobin A1C 6.6 (H) 0.0 - 5.6 %      Comment:      Normal <5.7%   Prediabetes 5.7-6.4%    Diabetes 6.5% or higher     Note: Adopted from ADA consensus guidelines.   Comprehensive metabolic panel   Result Value Ref Range    Sodium 137 133 - 144 mmol/L    Potassium 3.9 3.4 - 5.3 mmol/L    Chloride 101 94 - 109 mmol/L    Carbon Dioxide (CO2) 30 20 - 32 mmol/L    Anion Gap 6 3 - 14 mmol/L    Urea Nitrogen 20 7 - 30 mg/dL    Creatinine 1.18 0.66 - 1.25 mg/dL    Calcium 9.2 8.5 - 10.1 mg/dL    Glucose 141 (H) 70 - 99 mg/dL    Alkaline Phosphatase 108 40 - 150 U/L    AST 17 0 - 45 U/L    ALT 19 0 - 70 U/L    Protein Total 7.7 6.8 - 8.8 g/dL    Albumin 3.4 3.4 - 5.0 g/dL    Bilirubin Total 0.6 0.2 - 1.3 mg/dL    GFR Estimate 66 >60 mL/min/1.73m2      Comment:      Effective December 21, 2021 eGFRcr in adults is calculated using the 2021 CKD-EPI creatinine equation which includes age and gender (Keerthi et al., NEJM, DOI: 10.1056/DBHBnp0287705)   Lipid panel   Result Value Ref Range    Cholesterol 122 <200 mg/dL    Triglycerides 164 (H) <150 mg/dL    Direct Measure HDL 38 (L) >=40 mg/dL    LDL Cholesterol Calculated 51 <=100 mg/dL    Non HDL Cholesterol 84 <130 mg/dL    Patient Fasting > 8hrs? Unknown     Narrative    Cholesterol  Desirable:  <200 mg/dL    Triglycerides  Normal:  Less than 150 mg/dL  Borderline High:  150-199 mg/dL  High:  200-499 mg/dL  Very High:  Greater than  or equal to 500 mg/dL    Direct Measure HDL  Female:  Greater than or equal to 50 mg/dL   Male:  Greater than or equal to 40 mg/dL    LDL Cholesterol  Desirable:  <100mg/dL  Above Desirable:  100-129 mg/dL   Borderline High:  130-159 mg/dL   High:  160-189 mg/dL   Very High:  >= 190 mg/dL    Non HDL Cholesterol  Desirable:  130 mg/dL  Above Desirable:  130-159 mg/dL  Borderline High:  160-189 mg/dL  High:  190-219 mg/dL  Very High:  Greater than or equal to 220 mg/dL   N terminal pro BNP outpatient   Result Value Ref Range    N Terminal Pro BNP Outpatient 538 0 - 900 pg/mL      Comment:      Reference range shown and results flagged as abnormal are for the outpatient, non acute settings. Establishing a baseline value for each individual patient is useful for follow-up.    Suggested inpatient cut points for confirming diagnosis of CHF in an acute setting are:  >450 pg/mL (age 18 to less than 50)  >900 pg/mL (age 50 to less than 75)  >1800 pg/mL (75 yrs and older)    An inpatient or emergency department NT-proPBNP <300 pg/mL effectively rules out acute CHF, with 99% negative predictive value.               Sincerely,    Matthias Sanchez MD

## 2022-05-09 NOTE — PROGRESS NOTES
ANTICOAGULATION MANAGEMENT     Clarke Moreira 71 year old male is on warfarin with therapeutic INR result. (Goal INR 2.0-3.0)    Recent labs: (last 7 days)     05/09/22  1130   INR 2.01*       ASSESSMENT       Source(s): Chart Review and Patient/Caregiver Call       Warfarin doses taken: Warfarin taken as instructed    Diet: No new diet changes identified    New illness, injury, or hospitalization: No    Medication/supplement changes: None noted    Signs or symptoms of bleeding or clotting: No    Previous INR: Subtherapeutic    Additional findings: None       PLAN     Recommended plan for no diet, medication or health factor changes affecting INR     Dosing Instructions: continue your current warfarin dose with next INR in 4-6 weeks  (has been ~8 wks since last check)    Summary  As of 5/9/2022    Full warfarin instructions:  5 mg every Thu; 10 mg all other days   Next INR check:  6/20/2022             Telephone call with Clarke who verbalizes understanding and agrees to plan    Patient offered & declined to schedule next visit    Education provided: Goal range and significance of current result    Plan made per [facility] anticoagulation protocol    Laurel Mcneal RN  Anticoagulation Clinic  5/9/2022    _______________________________________________________________________     Anticoagulation Episode Summary     Current INR goal:  2.0-3.0   TTR:  72.2 % (8.7 mo)   Target end date:  Indefinite   Send INR reminders to:  [facility]    Indications    Chronic atrial fibrillation (H) [I48.20]           Comments:  Home Care         Anticoagulation Care Providers     Provider Role Specialty Phone number    Matthias Sanchez MD Referring Family Medicine [phone]

## 2022-05-18 ENCOUNTER — DOCUMENTATION ONLY (OUTPATIENT)
Dept: FAMILY MEDICINE | Facility: CLINIC | Age: 72
End: 2022-05-18
Payer: COMMERCIAL

## 2022-05-18 NOTE — PROGRESS NOTES
"When opening a documentation only encounter, be sure to enter in \"Chief Complaint\" Forms and in \" Comments\" Title of form, description if needed.    Clarke is a 71 year old  male  Form received via: Fax  Form now resides in: Provider Ready    Soni Borja MA      Form has been completed by provider.     Form sent out via: Mailed to [facility] Client [facility] per request by Clarke and a copy was placed in the scan basket.   Patient informed: no  Output date: May 24, 2022    Tiana Ramos CMA      **Please close the encounter**                "

## 2022-05-19 ENCOUNTER — VIRTUAL VISIT (OUTPATIENT)
Dept: PSYCHOLOGY | Facility: CLINIC | Age: 72
End: 2022-05-19
Payer: COMMERCIAL

## 2022-05-19 DIAGNOSIS — F41.1 GAD (GENERALIZED ANXIETY DISORDER): ICD-10-CM

## 2022-05-19 DIAGNOSIS — F33.1 MODERATE EPISODE OF RECURRENT MAJOR DEPRESSIVE DISORDER (H): Primary | ICD-10-CM

## 2022-05-19 PROCEDURE — 90834 PSYTX W PT 45 MINUTES: CPT | Mod: 95 | Performed by: PSYCHOLOGIST

## 2022-05-24 DIAGNOSIS — K59.00 CONSTIPATION, UNSPECIFIED CONSTIPATION TYPE: ICD-10-CM

## 2022-05-24 RX ORDER — AMOXICILLIN 250 MG
1-2 CAPSULE ORAL 2 TIMES DAILY PRN
Qty: 60 TABLET | Refills: 3 | Status: SHIPPED | OUTPATIENT
Start: 2022-05-24 | End: 2022-10-02

## 2022-06-03 ENCOUNTER — VIRTUAL VISIT (OUTPATIENT)
Dept: PSYCHOLOGY | Facility: CLINIC | Age: 72
End: 2022-06-03
Payer: COMMERCIAL

## 2022-06-03 DIAGNOSIS — F33.1 MODERATE EPISODE OF RECURRENT MAJOR DEPRESSIVE DISORDER (H): Primary | ICD-10-CM

## 2022-06-03 DIAGNOSIS — F41.1 GAD (GENERALIZED ANXIETY DISORDER): ICD-10-CM

## 2022-06-03 PROCEDURE — 90834 PSYTX W PT 45 MINUTES: CPT | Mod: 95 | Performed by: PSYCHOLOGIST

## 2022-06-22 ENCOUNTER — OFFICE VISIT (OUTPATIENT)
Dept: ORTHOPEDICS | Facility: CLINIC | Age: 72
End: 2022-06-22
Payer: COMMERCIAL

## 2022-06-22 ENCOUNTER — LAB (OUTPATIENT)
Dept: LAB | Facility: CLINIC | Age: 72
End: 2022-06-22

## 2022-06-22 ENCOUNTER — ANTICOAGULATION THERAPY VISIT (OUTPATIENT)
Dept: ANTICOAGULATION | Facility: CLINIC | Age: 72
End: 2022-06-22

## 2022-06-22 DIAGNOSIS — E11.49 TYPE II OR UNSPECIFIED TYPE DIABETES MELLITUS WITH NEUROLOGICAL MANIFESTATIONS, NOT STATED AS UNCONTROLLED(250.60) (H): Primary | ICD-10-CM

## 2022-06-22 DIAGNOSIS — I48.20 CHRONIC ATRIAL FIBRILLATION (H): Primary | ICD-10-CM

## 2022-06-22 DIAGNOSIS — B35.1 OM (ONYCHOMYCOSIS): ICD-10-CM

## 2022-06-22 DIAGNOSIS — I48.20 CHRONIC ATRIAL FIBRILLATION (H): ICD-10-CM

## 2022-06-22 LAB — INR BLD: 2.4 (ref 0.9–1.1)

## 2022-06-22 PROCEDURE — 85610 PROTHROMBIN TIME: CPT | Performed by: PATHOLOGY

## 2022-06-22 PROCEDURE — 11721 DEBRIDE NAIL 6 OR MORE: CPT | Performed by: PODIATRIST

## 2022-06-22 PROCEDURE — 99214 OFFICE O/P EST MOD 30 MIN: CPT | Mod: 25 | Performed by: PODIATRIST

## 2022-06-22 PROCEDURE — 36416 COLLJ CAPILLARY BLOOD SPEC: CPT | Performed by: PATHOLOGY

## 2022-06-22 NOTE — LETTER
6/22/2022         RE: Clarke Moreira  [address]  [place] [address]        Dear Colleague,    Thank you for referring your patient, Clarke Moreira, to the [facility] [place]. Please see a copy of my visit note below.    Chief Complaint   Patient presents with     Follow Up     Foot care.               Allergies   Allergen Reactions     Seasonal Allergies          Subjective: Clarke is a 71 year old male who presents to the clinic today for a diabetic foot exam and management. He relates that he has been doing well. He has no new open lesions.     Objective  Hemoglobin A1C POCT   Date Value Ref Range Status   05/06/2021 6.2 (H) 0 - 5.6 % Final     Comment:     Normal <5.7% Prediabetes 5.7-6.4%  Diabetes 6.5% or higher - adopted from ADA   consensus guidelines.     11/09/2020 5.9 (H) 4.1 - 5.7 % Final   07/09/2020 5.9 (H) 0 - 5.6 % Final     Comment:     Normal <5.7% Prediabetes 5.7-6.4%  Diabetes 6.5% or higher - adopted from ADA   consensus guidelines.       Hemoglobin A1C   Date Value Ref Range Status   05/09/2022 6.6 (H) 0.0 - 5.6 % Final     Comment:     Normal <5.7%   Prediabetes 5.7-6.4%    Diabetes 6.5% or higher     Note: Adopted from ADA consensus guidelines.       DP and PT pulses 1/4 BL. CRT 1 second. Absent pedal hair.  Gross and protective sensations are diminished BL.  Hammertoes to all lesser digits. Hallux malleus BL. Equinus BL. Pes planus.   Onychomycosis to toes x10.  With debridement of the right medial hallux nail, there was a sanguinous bulla subungually.  No signs or symptoms of infection.  Tyloma noted to the left 5th digit DIPJ, right dorsal hallux IPJ, and right 5th DIPJ  No open lesions noted.       Assessment: Clarke is a 68 YO male with DM2 and neuropathy.  Ulceration on the left dorsal hallux. Clinically, this has healed.   Onychomycosis  Tyloma.      Plan:   - Pt seen and evaluated  - Nails debrided x 10.   - Daily foot  exams.   - Pt to return to clinic in 12 weeks or sooner if problems arise.     KEYA BondM

## 2022-06-22 NOTE — PROGRESS NOTES
Chief Complaint   Patient presents with     Follow Up     Foot care.               Allergies   Allergen Reactions     Seasonal Allergies          Subjective: Clarke is a 71 year old male who presents to the clinic today for a diabetic foot exam and management. He relates that he has been doing well. He has no new open lesions.     Objective  Hemoglobin A1C POCT   Date Value Ref Range Status   05/06/2021 6.2 (H) 0 - 5.6 % Final     Comment:     Normal <5.7% Prediabetes 5.7-6.4%  Diabetes 6.5% or higher - adopted from ADA   consensus guidelines.     11/09/2020 5.9 (H) 4.1 - 5.7 % Final   07/09/2020 5.9 (H) 0 - 5.6 % Final     Comment:     Normal <5.7% Prediabetes 5.7-6.4%  Diabetes 6.5% or higher - adopted from ADA   consensus guidelines.       Hemoglobin A1C   Date Value Ref Range Status   05/09/2022 6.6 (H) 0.0 - 5.6 % Final     Comment:     Normal <5.7%   Prediabetes 5.7-6.4%    Diabetes 6.5% or higher     Note: Adopted from ADA consensus guidelines.       DP and PT pulses 1/4 BL. CRT 1 second. Absent pedal hair.  Gross and protective sensations are diminished BL.  Hammertoes to all lesser digits. Hallux malleus BL. Equinus BL. Pes planus.   Onychomycosis to toes x10.  With debridement of the right medial hallux nail, there was a sanguinous bulla subungually.  No signs or symptoms of infection.  Tyloma noted to the left 5th digit DIPJ, right dorsal hallux IPJ, and right 5th DIPJ  No open lesions noted.       Assessment: Clarke is a 70 YO male with DM2 and neuropathy.  Ulceration on the left dorsal hallux. Clinically, this has healed.   Onychomycosis  Tyloma.      Plan:   - Pt seen and evaluated  - Nails debrided x 10.   - Daily foot exams.   - Pt to return to clinic in 12 weeks or sooner if problems arise.

## 2022-06-22 NOTE — PROGRESS NOTES
ANTICOAGULATION MANAGEMENT     Clarke Moreira 72 year old male is on warfarin with therapeutic INR result. (Goal INR 2.0-3.0)    Recent labs: (last 7 days)     06/22/22  0939   INR 2.4*       ASSESSMENT       Source(s): Chart Review    Previous INR was Therapeutic last visit; previously outside of goal range    Medication, diet, health changes since last INR chart reviewed; none identified           PLAN     Recommended plan for no diet, medication or health factor changes affecting INR     Dosing Instructions: continue your current warfarin dose with next INR in 6 weeks       Summary  As of 6/22/2022    Full warfarin instructions:  5 mg every Thu; 10 mg all other days   Next INR check:  8/3/2022             Detailed voice message left for Clarke with dosing instructions and follow up date.     Contact [phone] to schedule and with any changes, questions or concerns.     Education provided: Please call back if any changes to your diet, medications or how you've been taking warfarin    Plan made per [facility] anticoagulation protocol    Laurel Mcneal RN  Anticoagulation Clinic  6/22/2022    _______________________________________________________________________     Anticoagulation Episode Summary     Current INR goal:  2.0-3.0   TTR:  80.0 % (8.7 mo)   Target end date:  Indefinite   Send INR reminders to:  [facility]    Indications    Chronic atrial fibrillation (H) [I48.20]           Comments:  Home Care         Anticoagulation Care Providers     Provider Role Specialty Phone number    Matthias Sanchez MD Referring Family Medicine [phone]

## 2022-06-27 ENCOUNTER — VIRTUAL VISIT (OUTPATIENT)
Dept: PSYCHOLOGY | Facility: CLINIC | Age: 72
End: 2022-06-27
Payer: COMMERCIAL

## 2022-06-27 DIAGNOSIS — F33.1 MODERATE EPISODE OF RECURRENT MAJOR DEPRESSIVE DISORDER (H): Primary | ICD-10-CM

## 2022-06-27 DIAGNOSIS — F41.1 GAD (GENERALIZED ANXIETY DISORDER): ICD-10-CM

## 2022-06-27 PROCEDURE — 90834 PSYTX W PT 45 MINUTES: CPT | Mod: 95 | Performed by: PSYCHOLOGIST

## 2022-06-27 NOTE — PROGRESS NOTES
"\"We have found that certain health care needs can be provided without the need for a face to face visit.  This service lets us provide the care you need with a phone conversation. I will have full access to your [facility] medical record during this entire phone call.   I will be taking notes for your medical record.  Since this is like an office visit, we will bill your insurance company for this service. There are potential benefits and risks of telephone visits (e.g. limits to patient confidentiality) that differ from in-person visits.?  Confidentiality still applies for telephone services, and nobody will record the visit.  It is important to be in a quiet, private space that is free of distractions (including cell phone or other devices) during the visit.??If during the course of the call I believe a telephone visit is not appropriate, you will not be charged for this service.\"     Consent has been obtained for this service by care team member: Yes     Emergency contact: Janice Kady [phone]  Closest Emergency Room: [place]  Location at time of call: home    Behavioral Health Progress Note    Client's Legal Name: Clarke Moreira    Client's Preferred Name: Clarke  YOB: 1950  Type of Service: telephone, counseling  Length of Service:   Start time: 8:20  End time: 9:01  Duration: 41 minutes  Attendees: patient    Identifying Information and Presenting Problem:    The patient is a 72 year old American male who I have seen off and on for therapy over the past couple of years.  Clarke did have difficulties at times coming into clinic for appointments as the anxiety would often lead him to cancel appointments.  Since the pandemic started and we have been doing phone visits, Clarke reports feeling much more comfortable in all of his appointments when they are done via telephone than when he is in person.  In person appointments with all providers have always caused significant " "distress due to his desire to be perceived in a particular way.  On the telephone, he feels as if he can better articulate his experience and what he is thinking as he feels less need to project a particular image to those that are caring for him.  Anxiety has kept Clarke from seeking medical care that he needs when he notices exacerbations of conditions.  We have been focused on increasing skills to cope with anxiety so that he can stay appropriately engaged in needed healthcare, as well as continuing to challenge very embedded negative core beliefs about himself.     Treatment Objective(s) Addressed in This Session:    Clarke will practice a healthy daily discipline of diet and exercise.  Clarke will learn to manage anxiety so he can make and keep appointments     Progress on / Status of Treatment Objective(s) / Homework:  Making some progress, reports feeling more able to engage with the world around him.    PHQ-9:   PHQ 11/9/2020 1/6/2020 9/3/2019   PHQ-9 Total Score 22 21 25   Q9: Thoughts of better off dead/self-harm past 2 weeks More than half the days Several days Nearly every day   F/U: Thoughts of suicide or self-harm - No -   F/U: Safety concerns - No -      CHRIS-7:   CHRIS-7 SCORE 9/3/2019 5/10/2019 12/11/2018   Total Score - - -   Total Score 19 13 20     Topics Discussed/Interventions Provided:  \"I'm reeling from the world we are living in\"  Session today explored the ways in which the many events that are happening in the world feed the ever present fears that he already has.  Focused conversation on how to take action even when the fear is present.  Used example provided by Clarke about a recent interaction with his podiatrist that was really positive.  A couple of years ago, he was really frustrated at a visit with that provider, but he shared those feelings with him despite the fear he has about doing so and now that has led to a relationship that Clarke really values.  He would not have created " space fo rhtis to happen if he hadn't taken action even when fear was present.  When sharing about positive steps that Clarke has taken, he was wanting to discount/minimize these. We practiced having him to stay with the statements about the positives for a short time without immediately diminishing them.      Assessment:   The patient appeared to be active and engaged in today's session and was receptive to feedback.     Mental Status:   During our visit today, Clarke was open, easy to engage with and pleasant. He was alert and oriented to all spheres.  Unable to assess appearance as this was a phone visit.  Speech was normal rate and rhythm.  Mood was described as overwhelmed by events in our [place], affect was appropriate to stated mood. Thought processes were logical and coherent. Was able to allow space for positive statements about himself today, although the self-deprecating thoughts are there.  Has some thoughts that it would be ok if he , but denies any intent or plan to harm himself.  Memory appeared grossly intact with no formal evaluation. Insight and judgment were good.  Patient exhibited good impulse control during the appointment.     Does the patient appear to be at imminent risk of harm to self/others at this time? No    The session was necessary to explore ways to continue to take steps toward ones values/important things in life even when strong feelings such as fear or uncertainty are present. Ongoing depressive symptoms and low self-view continue to interfere with patient's ability to function in areas related to social relationships day to day self care or health behaviors chronic disease management household management. Ongoing psychotherapy is necessary to stabilize mood, provide counseling, improve functioning with daily activities and provide support.    DSM-5 Diagnosis:  Major Depression, recurrent, moderate  Generalized Anxiety Disorder  R/o persistent depressive  disorder    Plan:  - f/up on 7/28 at 8:20  - update diagnostic assessment and treatment plan       Lety Buenrostro PsyD    NOTE: Treatment plan update needed on 3/10/21.  Diagnostic assessment update due 10/15/19.

## 2022-07-15 ENCOUNTER — TELEPHONE (OUTPATIENT)
Dept: WOUND CARE | Facility: CLINIC | Age: 72
End: 2022-07-15

## 2022-07-15 NOTE — TELEPHONE ENCOUNTER
[facility] Call Center    Phone Message    May a detailed message be left on voicemail: yes     Reason for Call: Other: Pt has a device that he sent back and wants to know if it was recvd      Action Taken: Other: ortho -csc    Travel Screening: Not Applicable

## 2022-07-19 NOTE — TELEPHONE ENCOUNTER
Attempted to call patient to discuss his concern about a device. Dr. Lagos did not provide the patient with a device. Busy tone. Will try again at another time.

## 2022-07-28 ENCOUNTER — VIRTUAL VISIT (OUTPATIENT)
Dept: PSYCHOLOGY | Facility: CLINIC | Age: 72
End: 2022-07-28
Payer: COMMERCIAL

## 2022-07-28 DIAGNOSIS — F41.1 GAD (GENERALIZED ANXIETY DISORDER): ICD-10-CM

## 2022-07-28 DIAGNOSIS — F33.1 MODERATE EPISODE OF RECURRENT MAJOR DEPRESSIVE DISORDER (H): Primary | ICD-10-CM

## 2022-07-28 PROCEDURE — 90834 PSYTX W PT 45 MINUTES: CPT | Mod: 95 | Performed by: PSYCHOLOGIST

## 2022-07-28 NOTE — PROGRESS NOTES
"\"We have found that certain health care needs can be provided without the need for a face to face visit.  This service lets us provide the care you need with a phone conversation. I will have full access to your [facility] medical record during this entire phone call.   I will be taking notes for your medical record.  Since this is like an office visit, we will bill your insurance company for this service. There are potential benefits and risks of telephone visits (e.g. limits to patient confidentiality) that differ from in-person visits.?  Confidentiality still applies for telephone services, and nobody will record the visit.  It is important to be in a quiet, private space that is free of distractions (including cell phone or other devices) during the visit.??If during the course of the call I believe a telephone visit is not appropriate, you will not be charged for this service.\"     Consent has been obtained for this service by care team member: Yes     Emergency contact: Janice Kady [phone]  Closest Emergency Room: [place]  Location at time of call: home    Behavioral Health Progress Note    Client's Legal Name: Clarke Moreira    Client's Preferred Name: Clarke  YOB: 1950  Type of Service: telephone, counseling  Length of Service:   Start time: 8:22  End time: 9:03  Duration: 41 minutes  Attendees: patient    Identifying Information and Presenting Problem:    The patient is a 72 year old American male who I have seen off and on for therapy over the past couple of years.  Clarke did have difficulties at times coming into clinic for appointments as the anxiety would often lead him to cancel appointments.  Since the pandemic started and we have been doing phone visits, Clarke reports feeling much more comfortable in all of his appointments when they are done via telephone than when he is in person.  In person appointments with all providers have always caused significant " "distress due to his desire to be perceived in a particular way.  On the telephone, he feels as if he can better articulate his experience and what he is thinking as he feels less need to project a particular image to those that are caring for him.  Anxiety has kept Clarke from seeking medical care that he needs when he notices exacerbations of conditions.  We have been focused on increasing skills to cope with anxiety so that he can stay appropriately engaged in needed healthcare, as well as continuing to challenge very embedded negative core beliefs about himself.     Treatment Objective(s) Addressed in This Session:    Clarke will practice a healthy daily discipline of diet and exercise.  Clarke will learn to manage anxiety so he can make and keep appointments     Progress on / Status of Treatment Objective(s) / Homework:  Reports feeling same sense of melancholy and purposelessness     PHQ-9:   PHQ 11/9/2020 1/6/2020 9/3/2019   PHQ-9 Total Score 22 21 25   Q9: Thoughts of better off dead/self-harm past 2 weeks More than half the days Several days Nearly every day   F/U: Thoughts of suicide or self-harm - No -   F/U: Safety concerns - No -      CHRIS-7:   CHRIS-7 SCORE 9/3/2019 5/10/2019 12/11/2018   Total Score - - -   Total Score 19 13 20     Topics Discussed/Interventions Provided:  Session today focused on identifying one tangible thing that Clarke would like to be different.  He reports that one thing that constantly nags at him and contributes to the vicious cycle of thoughts that occurs is the state of his home.  He would like to get his house in order, clear the clutter, sweep, mop etc, sort through papers.  Discussed the reasons that made these actions important to Clarke.  Did a short visualization exercise of what it would like for him if this was complete.  Explored steps that he could take to move in that direction.  Broke these steps down into more \"bite-sized\" chunks that were achievable.  Clarke " "states \"Make the job smaller, not have trying to do the job make me smaller\"  Would like to start with trying to sweep and vacuum his house before our next visit.  Next step will can focus on is the paperwork.      Assessment:   The patient appeared to be active and engaged in today's session and was receptive to feedback.     Mental Status:   During our visit today, Clarke was cooperative, open and pleasant. He was alert and oriented to person, place, time, and situation.  Unable to assess appearance as this was a phone visit.  Speech was thoughtful, reflective, normal rate and rhythm.  Mood was described \"melancholic\", affect was appropriate to stated mood. Thought processes were logical and coherent.  Thought content includes frequent self-deprecating thoughts.  Has some thoughts that it would be ok if he , but denies any intent or plan to harm himself.  Memory appeared grossly intact with no formal evaluation. Insight and judgment were good.  Patient exhibited good impulse control during the appointment.     Does the patient appear to be at imminent risk of harm to self/others at this time? No    The session was necessary to identify one area of his life that Clarke believes could be improved, the reasons why this was important to him, as well as breaking this down into small steps to help enhance sense of self-efficacy. Ongoing depressive symptoms and low self-view continue to interfere with patient's ability to function in areas related to social relationships day to day self care or health behaviors chronic disease management household management. Ongoing psychotherapy is necessary to stabilize mood, provide counseling, improve functioning with daily activities and provide support.    DSM-5 Diagnosis:  Major Depression, recurrent, moderate  Generalized Anxiety Disorder  R/o persistent depressive disorder    Plan:  - next visit scheduled for   - vacuum/sweep apartment  - update diagnostic assessment " and treatment plan       Lety Buenrostro PsyD    NOTE: Treatment plan update needed on 3/10/21.  Diagnostic assessment update due 10/15/19.

## 2022-08-18 ENCOUNTER — VIRTUAL VISIT (OUTPATIENT)
Dept: PSYCHOLOGY | Facility: CLINIC | Age: 72
End: 2022-08-18
Payer: COMMERCIAL

## 2022-08-18 DIAGNOSIS — F33.1 MODERATE EPISODE OF RECURRENT MAJOR DEPRESSIVE DISORDER (H): Primary | ICD-10-CM

## 2022-08-18 DIAGNOSIS — F41.1 GAD (GENERALIZED ANXIETY DISORDER): ICD-10-CM

## 2022-08-18 PROCEDURE — 90834 PSYTX W PT 45 MINUTES: CPT | Mod: 95 | Performed by: PSYCHOLOGIST

## 2022-08-24 ENCOUNTER — OFFICE VISIT (OUTPATIENT)
Dept: ORTHOPEDICS | Facility: CLINIC | Age: 72
End: 2022-08-24
Payer: COMMERCIAL

## 2022-08-24 ENCOUNTER — LAB (OUTPATIENT)
Dept: LAB | Facility: CLINIC | Age: 72
End: 2022-08-24

## 2022-08-24 ENCOUNTER — ANTICOAGULATION THERAPY VISIT (OUTPATIENT)
Dept: ANTICOAGULATION | Facility: CLINIC | Age: 72
End: 2022-08-24

## 2022-08-24 DIAGNOSIS — I48.20 CHRONIC ATRIAL FIBRILLATION (H): ICD-10-CM

## 2022-08-24 DIAGNOSIS — I48.20 CHRONIC ATRIAL FIBRILLATION (H): Primary | ICD-10-CM

## 2022-08-24 DIAGNOSIS — B35.1 OM (ONYCHOMYCOSIS): ICD-10-CM

## 2022-08-24 DIAGNOSIS — E11.49 TYPE II OR UNSPECIFIED TYPE DIABETES MELLITUS WITH NEUROLOGICAL MANIFESTATIONS, NOT STATED AS UNCONTROLLED(250.60) (H): Primary | ICD-10-CM

## 2022-08-24 LAB — INR BLD: 2.5 (ref 0.9–1.1)

## 2022-08-24 PROCEDURE — 99214 OFFICE O/P EST MOD 30 MIN: CPT | Performed by: PODIATRIST

## 2022-08-24 PROCEDURE — 36415 COLL VENOUS BLD VENIPUNCTURE: CPT | Performed by: PATHOLOGY

## 2022-08-24 PROCEDURE — 85610 PROTHROMBIN TIME: CPT | Performed by: PATHOLOGY

## 2022-08-24 NOTE — PROGRESS NOTES
Chief Complaint   Patient presents with     Follow Up     9 week follow up.               Allergies   Allergen Reactions     Seasonal Allergies          Subjective: Clarke is a 72 year old male who presents to the clinic today for a diabetic foot exam and management. He relates that he has been doing well. He has no new open lesions.     Objective  Hemoglobin A1C   Date Value Ref Range Status   05/09/2022 6.6 (H) 0.0 - 5.6 % Final     Comment:     Normal <5.7%   Prediabetes 5.7-6.4%    Diabetes 6.5% or higher     Note: Adopted from ADA consensus guidelines.   05/06/2021 6.2 (H) 0 - 5.6 % Final     Comment:     Normal <5.7% Prediabetes 5.7-6.4%  Diabetes 6.5% or higher - adopted from ADA   consensus guidelines.     11/09/2020 5.9 (H) 4.1 - 5.7 % Final   07/09/2020 5.9 (H) 0 - 5.6 % Final     Comment:     Normal <5.7% Prediabetes 5.7-6.4%  Diabetes 6.5% or higher - adopted from ADA   consensus guidelines.         DP and PT pulses 1/4 BL. CRT 1 second. Absent pedal hair.  Gross and protective sensations are diminished BL.  Hammertoes to all lesser digits. Hallux malleus BL. Equinus BL. Pes planus.   Onychomycosis to toes x10.  With debridement of the right medial hallux nail, there was a sanguinous bulla subungually.  No signs or symptoms of infection.  Tyloma noted to the left 5th digit DIPJ, right dorsal hallux IPJ, and right 5th DIPJ  No open lesions noted.       Assessment: Clarke is a 71 YO male with DM2 and neuropathy.  Ulceration on the left dorsal hallux. Clinically, this has healed.   Onychomycosis  Tyloma.      Plan:   - Pt seen and evaluated  - Nails debrided x 10.   - Daily foot exams.   - Pt to return to clinic in 12 weeks or sooner if problems arise.

## 2022-08-24 NOTE — NURSING NOTE
Reason For Visit:   Chief Complaint   Patient presents with     Follow Up     9 week follow up.        Pain Assessment  Patient Currently in Pain: Other (Comment) (Patient relates that recently he has been having increased numbness and neuropathy sensation. Concerns, left third and fourth toes.)        Allergies   Allergen Reactions     Seasonal Allergies            Whitley Munoz LPN

## 2022-08-24 NOTE — LETTER
8/24/2022         RE: Clarke Moreira  [address]  [facility] [address]        Dear Colleague,    Thank you for referring your patient, Clarke Moreira, to the [facility] [place]. Please see a copy of my visit note below.    Chief Complaint   Patient presents with     Follow Up     9 week follow up.               Allergies   Allergen Reactions     Seasonal Allergies          Subjective: Clarke is a 72 year old male who presents to the clinic today for a diabetic foot exam and management. He relates that he has been doing well. He has no new open lesions.     Objective  Hemoglobin A1C   Date Value Ref Range Status   05/09/2022 6.6 (H) 0.0 - 5.6 % Final     Comment:     Normal <5.7%   Prediabetes 5.7-6.4%    Diabetes 6.5% or higher     Note: Adopted from ADA consensus guidelines.   05/06/2021 6.2 (H) 0 - 5.6 % Final     Comment:     Normal <5.7% Prediabetes 5.7-6.4%  Diabetes 6.5% or higher - adopted from ADA   consensus guidelines.     11/09/2020 5.9 (H) 4.1 - 5.7 % Final   07/09/2020 5.9 (H) 0 - 5.6 % Final     Comment:     Normal <5.7% Prediabetes 5.7-6.4%  Diabetes 6.5% or higher - adopted from ADA   consensus guidelines.         DP and PT pulses 1/4 BL. CRT 1 second. Absent pedal hair.  Gross and protective sensations are diminished BL.  Hammertoes to all lesser digits. Hallux malleus BL. Equinus BL. Pes planus.   Onychomycosis to toes x10.  With debridement of the right medial hallux nail, there was a sanguinous bulla subungually.  No signs or symptoms of infection.  Tyloma noted to the left 5th digit DIPJ, right dorsal hallux IPJ, and right 5th DIPJ  No open lesions noted.       Assessment: Clarke is a 71 YO male with DM2 and neuropathy.  Ulceration on the left dorsal hallux. Clinically, this has healed.   Onychomycosis  Tyloma.      Plan:   - Pt seen and evaluated  - Nails debrided x 10.   - Daily foot exams.   - Pt to return to clinic in 12 weeks or  sooner if problems arise.         Jesús Lagos, KIERRA

## 2022-08-24 NOTE — PROGRESS NOTES
ANTICOAGULATION MANAGEMENT     Clarke Moreira 72 year old male is on warfarin with therapeutic INR result. (Goal INR 2.0-3.0)    Recent labs: (last 7 days)     08/24/22  0849   INR 2.5*       ASSESSMENT       Source(s): Chart Review and Patient/Caregiver Call       Warfarin doses taken: Warfarin taken as instructed    Diet: No new diet changes identified    New illness, injury, or hospitalization: No. Has noted that he's gained some weight recently and not getting around as well/often.     Medication/supplement changes: None noted    Signs or symptoms of bleeding or clotting: No    Previous INR: Therapeutic last 2(+) visits    Additional findings: None       PLAN     Recommended plan for no diet, medication or health factor changes affecting INR     Dosing Instructions: Continue your current warfarin dose with next INR in 6 weeks       Summary  As of 8/24/2022    Full warfarin instructions:  5 mg every Thu; 10 mg all other days   Next INR check:  10/5/2022             Telephone call with Clarke who verbalizes understanding and agrees to plan    Patient offered & declined to schedule next visit    Education provided: Goal range and significance of current result    Plan made per [facility] anticoagulation protocol    Laurel Mcneal RN  Anticoagulation Clinic  8/24/2022    _______________________________________________________________________     Anticoagulation Episode Summary     Current INR goal:  2.0-3.0   TTR:  80.0 % (8.7 mo)   Target end date:  Indefinite   Send INR reminders to:  [facility]    Indications    Chronic atrial fibrillation (H) [I48.20]           Comments:  Home Care         Anticoagulation Care Providers     Provider Role Specialty Phone number    Matthias Sanchez MD Referring Family Medicine [phone]

## 2022-09-18 NOTE — PROGRESS NOTES
ANTICOAGULATION MANAGEMENT     Clarke Moreira 71 year old male is on warfarin with therapeutic INR result. (Goal INR 2.0-3.0)    Recent labs: (last 7 days)     12/02/21  0825   INR 2.3*       ASSESSMENT     Source(s): Chart Review and Patient/Caregiver Call       Warfarin doses taken: Warfarin taken as instructed    Diet: No new diet changes identified    New illness, injury, or hospitalization: No    Medication/supplement changes: None noted    Signs or symptoms of bleeding or clotting: No    Previous INR: Therapeutic last 2(+) visits    Additional findings: None     PLAN     Recommended plan for no diet, medication or health factor changes affecting INR     Dosing Instructions: Continue your current warfarin dose with next INR in 6 weeks       Summary  As of 12/2/2021    Full warfarin instructions:  5 mg every Thu; 10 mg all other days   Next INR check:  1/13/2022             Telephone call with Clarke who agrees to plan and repeated back plan correctly    Patient offered & declined to schedule next visit    Education provided: Please call back if any changes to your diet, medications or how you've been taking warfarin    Plan made per [facility] anticoagulation protocol    Guera cSott RN  Anticoagulation Clinic  12/2/2021    _______________________________________________________________________     Anticoagulation Episode Summary     Current INR goal:  2.0-3.0   TTR:  88.8 % (7.4 mo)   Target end date:  Indefinite   Send INR reminders to:  [facility]    Indications    Chronic atrial fibrillation (H) [I48.20]           Comments:  Home Care         Anticoagulation Care Providers     Provider Role Specialty Phone number    Matthias Sanchez MD Referring Family Medicine [phone]          
[Negative] : Genitourinary

## 2022-09-30 ENCOUNTER — TELEPHONE (OUTPATIENT)
Dept: FAMILY MEDICINE | Facility: CLINIC | Age: 72
End: 2022-09-30

## 2022-09-30 NOTE — TELEPHONE ENCOUNTER
[facility] phone call message- patient requesting a refill:    Full Medication Name: senna-docusate (SENOKOT-S/PERICOLACE) 8.6-50 MG tablet    Dose: Take 1-2 tablets by mouth 2 times daily as needed for constipation - Oral    Pharmacy confirmed as     [facility] - [place], [place] - [address]  [address]  [facility] [address]  Phone: [phone] Fax: [phone]  : Yes    Additional Comments: Pharmacy called and they are wanting this medication in a 90 day supply instead of a 60 day supply.      OK to leave a message on voice mail? Yes    Primary language: English      needed? No    Call taken on September 30, 2022 at 11:52 AM by Nataliia Anthony

## 2022-10-02 DIAGNOSIS — K59.00 CONSTIPATION, UNSPECIFIED CONSTIPATION TYPE: ICD-10-CM

## 2022-10-02 RX ORDER — AMOXICILLIN 250 MG
1-2 CAPSULE ORAL 2 TIMES DAILY PRN
Qty: 60 TABLET | Refills: 3 | Status: SHIPPED | OUTPATIENT
Start: 2022-10-02 | End: 2022-10-03

## 2022-10-02 NOTE — TELEPHONE ENCOUNTER
"Request for medication refill: senna-docusate (SENOKOT-S/PERICOLACE) 8.6-50 MG tablet    Providers if patient needs an appointment and you are willing to give a one month supply please refill for one month and  send a letter/MyChart using \".SMILLIMITEDREFILL\" .smillimited and route chart to \"P SMI \" (Giving one month refill in non controlled medications is strongly recommended before denial)    If refill has been denied, meaning absolutely no refills without visit, please complete the smart phrase \".smirxrefuse\" and route it to the \"P SMI MED REFILLS\"  pool to inform the patient and the pharmacy.    June Moore, CMA      "

## 2022-10-03 ENCOUNTER — TELEPHONE (OUTPATIENT)
Dept: FAMILY MEDICINE | Facility: CLINIC | Age: 72
End: 2022-10-03

## 2022-10-03 DIAGNOSIS — K59.00 CONSTIPATION, UNSPECIFIED CONSTIPATION TYPE: ICD-10-CM

## 2022-10-03 RX ORDER — AMOXICILLIN 250 MG
1-2 CAPSULE ORAL 2 TIMES DAILY PRN
Qty: 180 TABLET | Refills: 0 | Status: SHIPPED | OUTPATIENT
Start: 2022-10-03 | End: 2023-01-03

## 2022-10-03 NOTE — TELEPHONE ENCOUNTER
[facility] phone call message- medication clarification/question:    Full Medication Name:  senna-docusate (SENOKOT-S/PERICOLACE) 8.6-50 MG tablet      Dose: Take 1-2 tablets by mouth 2 times daily as needed for constipation - Oral    Question: Pharmacy called and said they have called 3 times about this medication. They are wanting a 90 day supply rather than the 15 day supply.      Pharmacy said to roberto this as urgent.     Pharmacy confirmed as    [facility] - [facility] [address]: Yes    OK to leave a message on voice mail? Yes    Primary language: English      needed? No    Call taken on October 3, 2022 at 10:51 AM by Nataliia Anthony

## 2022-10-03 NOTE — TELEPHONE ENCOUNTER
PCP sent 60 tablets with refills. RN resent 90day supply no refills per protocol    Elif Olson RN

## 2022-11-14 ENCOUNTER — TELEPHONE (OUTPATIENT)
Dept: ANTICOAGULATION | Facility: CLINIC | Age: 72
End: 2022-11-14

## 2022-11-14 NOTE — TELEPHONE ENCOUNTER
ANTICOAGULATION     Clarke RENÉE SolomonMoreira is overdue for INR check.      Spoke with Clarke who declined to schedule a lab appointment at this time. If calling back please schedule as soon as possible.     Patient states he has been struggling with a lot of depression symptoms and had to cancel 2 appointments due to this. He is planning to reschedule his PCP appt tomorrow and will have an INR done with that appt. States it will be by the middle of December. INRs are stable. Agreed to set date out a few weeks and follow up with patient at that time if no INR done.     Laurel Mcneal RN

## 2022-12-13 ENCOUNTER — OFFICE VISIT (OUTPATIENT)
Dept: FAMILY MEDICINE | Facility: CLINIC | Age: 72
End: 2022-12-13
Payer: COMMERCIAL

## 2022-12-13 ENCOUNTER — ANTICOAGULATION THERAPY VISIT (OUTPATIENT)
Dept: ANTICOAGULATION | Facility: CLINIC | Age: 72
End: 2022-12-13

## 2022-12-13 VITALS
RESPIRATION RATE: 18 BRPM | SYSTOLIC BLOOD PRESSURE: 137 MMHG | BODY MASS INDEX: 44.1 KG/M2 | WEIGHT: 315 LBS | HEIGHT: 71 IN | DIASTOLIC BLOOD PRESSURE: 73 MMHG | OXYGEN SATURATION: 98 % | HEART RATE: 72 BPM

## 2022-12-13 DIAGNOSIS — I48.20 CHRONIC ATRIAL FIBRILLATION (H): Primary | ICD-10-CM

## 2022-12-13 DIAGNOSIS — L98.9 SKIN LESION: Primary | ICD-10-CM

## 2022-12-13 DIAGNOSIS — F41.8 DEPRESSION WITH ANXIETY: ICD-10-CM

## 2022-12-13 DIAGNOSIS — I48.20 CHRONIC ATRIAL FIBRILLATION (H): ICD-10-CM

## 2022-12-13 DIAGNOSIS — E11.9 TYPE 2 DIABETES MELLITUS WITHOUT COMPLICATION, WITHOUT LONG-TERM CURRENT USE OF INSULIN (H): ICD-10-CM

## 2022-12-13 DIAGNOSIS — Z23 ENCOUNTER FOR IMMUNIZATION: ICD-10-CM

## 2022-12-13 DIAGNOSIS — I50.32 CHRONIC HEART FAILURE WITH PRESERVED EJECTION FRACTION (H): ICD-10-CM

## 2022-12-13 LAB
ANION GAP SERPL CALCULATED.3IONS-SCNC: 14 MMOL/L (ref 7–15)
BUN SERPL-MCNC: 13.5 MG/DL (ref 8–23)
CALCIUM SERPL-MCNC: 9.2 MG/DL (ref 8.8–10.2)
CHLORIDE SERPL-SCNC: 98 MMOL/L (ref 98–107)
CREAT SERPL-MCNC: 1.05 MG/DL (ref 0.67–1.17)
DEPRECATED HCO3 PLAS-SCNC: 24 MMOL/L (ref 22–29)
ERYTHROCYTE [DISTWIDTH] IN BLOOD BY AUTOMATED COUNT: 13.8 % (ref 10–15)
GFR SERPL CREATININE-BSD FRML MDRD: 75 ML/MIN/1.73M2
GLUCOSE SERPL-MCNC: 133 MG/DL (ref 70–99)
HBA1C MFR BLD: 6.4 % (ref 0–5.6)
HCT VFR BLD AUTO: 47.4 % (ref 40–53)
HGB BLD-MCNC: 15.3 G/DL (ref 13.3–17.7)
INR BLD: 2.3 (ref 0.9–1.1)
MCH RBC QN AUTO: 29.1 PG (ref 26.5–33)
MCHC RBC AUTO-ENTMCNC: 32.3 G/DL (ref 31.5–36.5)
MCV RBC AUTO: 90 FL (ref 78–100)
NT-PROBNP SERPL-MCNC: 721 PG/ML (ref 0–900)
PLATELET # BLD AUTO: 224 10E3/UL (ref 150–450)
POTASSIUM SERPL-SCNC: 4.1 MMOL/L (ref 3.4–5.3)
RBC # BLD AUTO: 5.26 10E6/UL (ref 4.4–5.9)
SODIUM SERPL-SCNC: 136 MMOL/L (ref 136–145)
WBC # BLD AUTO: 8.4 10E3/UL (ref 4–11)

## 2022-12-13 PROCEDURE — G0008 ADMIN INFLUENZA VIRUS VAC: HCPCS | Performed by: FAMILY MEDICINE

## 2022-12-13 PROCEDURE — 85610 PROTHROMBIN TIME: CPT | Performed by: FAMILY MEDICINE

## 2022-12-13 PROCEDURE — 0064A COVID-19,PF,MODERNA (18+ YRS BOOSTER .25ML): CPT | Performed by: FAMILY MEDICINE

## 2022-12-13 PROCEDURE — 91306 COVID-19,PF,MODERNA (18+ YRS BOOSTER .25ML): CPT | Performed by: FAMILY MEDICINE

## 2022-12-13 PROCEDURE — 83036 HEMOGLOBIN GLYCOSYLATED A1C: CPT | Performed by: FAMILY MEDICINE

## 2022-12-13 PROCEDURE — 85027 COMPLETE CBC AUTOMATED: CPT | Performed by: FAMILY MEDICINE

## 2022-12-13 PROCEDURE — 36415 COLL VENOUS BLD VENIPUNCTURE: CPT | Performed by: FAMILY MEDICINE

## 2022-12-13 PROCEDURE — 99214 OFFICE O/P EST MOD 30 MIN: CPT | Mod: 25 | Performed by: FAMILY MEDICINE

## 2022-12-13 PROCEDURE — 36416 COLLJ CAPILLARY BLOOD SPEC: CPT | Performed by: FAMILY MEDICINE

## 2022-12-13 PROCEDURE — 90662 IIV NO PRSV INCREASED AG IM: CPT | Performed by: FAMILY MEDICINE

## 2022-12-13 PROCEDURE — 83880 ASSAY OF NATRIURETIC PEPTIDE: CPT | Performed by: FAMILY MEDICINE

## 2022-12-13 PROCEDURE — 80048 BASIC METABOLIC PNL TOTAL CA: CPT | Performed by: FAMILY MEDICINE

## 2022-12-13 ASSESSMENT — PATIENT HEALTH QUESTIONNAIRE - PHQ9: SUM OF ALL RESPONSES TO PHQ QUESTIONS 1-9: 18

## 2022-12-13 NOTE — PROGRESS NOTES
"  Assessment & Plan     Skin lesion  Large lesion on left upper arm.  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  Refer to procedure clinic for excision and pathology.  Patient is on anticoagulation.  Will discuss with Pharm.D. about options  - Procedure Clinic - [facility] Internal Referral; Future    Chronic heart failure with preserved ejection fraction (H)  No visit with cardiology for over a year.  Having some mild symptoms but overall is doing well with peripheral edema and breathing breathing.   (previous 538)    - Adult Cardiology Eval  Referral; Future  - N terminal pro BNP outpatient; Future  - N terminal pro BNP outpatient    Chronic atrial fibrillation (H)  INR in a good range.  - INR point of care    Type 2 diabetes mellitus without complication, without long-term current use of insulin (H)  A1c 6.4  Continue present regiment  - Hemoglobin A1c; Future  - CBC with platelets; Future  - Basic metabolic panel; Future  - Albumin Random Urine Quantitative with Creat Ratio; Future  - Hemoglobin A1c  - CBC with platelets  - Basic metabolic panel  - Albumin Random Urine Quantitative with Creat Ratio; Future    Depression  Has similar suicidal ideation as for years in the past.  Has appointment with psychologist next week.    Encounter for immunization  Flu vaccine  COVID-vaccine               BMI:   Estimated body mass index is 56.49 kg/m  as calculated from the following:    Height as of this encounter: 1.803 m (5' 11\").    Weight as of this encounter: 183.7 kg (405 lb).       Depression Screening Follow Up    PHQ 12/13/2022   PHQ-9 Total Score 18   Q9: Thoughts of better off dead/self-harm past 2 weeks Several days   F/U: Thoughts of suicide or self-harm -   F/U: Safety concerns -       Matthias Sanchez MD  [facility] ANGEL Mir is a 72 year old, presenting for the following health issues:  RECHECK (Flu and Covid Booster(Moderna))      HPI       1. Skin lesion  L " "upper arm  1-2 years. Growing.   Similar to cancer on forehead      2. Heart  Patient with a history of A. fib and heart failure.  Has not seen cardiology for over a year or more.  He would like help in getting back to them.  He has had some minimal chest discomfort when at rest.  No problems when walking.    3. Depression  Worse since summer  Increase wt due to eating.  Anxiety with elections  Has appointment with psychologist next week.  Urged him to keep that appointment.    PHQ 1/6/2020 11/9/2020 12/13/2022   PHQ-9 Total Score 21 22 18   Q9: Thoughts of better off dead/self-harm past 2 weeks Several days More than half the days Several days   F/U: Thoughts of suicide or self-harm No - -   F/U: Safety concerns No - -           4. DM  Doing well with his diabetes  Using metformin twice daily  A1c 6.4 (previous 6.6).    5.  Vaccines  Would like COVID booster and flu shot.            Review of Systems         Objective    /73   Pulse 72   Resp 18   Ht 1.803 m (5' 11\")   Wt (!) 183.7 kg (405 lb)   SpO2 98%   BMI 56.49 kg/m    Body mass index is 56.49 kg/m .  Physical Exam   GENERAL: healthy, alert and no distress  RESP: lungs clear to auscultation - no rales, rhonchi or wheezes  CV: regular rate and rhythm, normal S1 S2, no S3 or S4, no murmur, click or rub, no peripheral edema and peripheral pulses strong  MS: compression stockings on bilaterally. Best I've seen the legs. Trace edema  SKIN: 1cm raised, crusted lesion L upper arm  PSYCH: mentation appears normal, affect normal/bright    Results for orders placed or performed in visit on 12/13/22 (from the past 24 hour(s))   INR point of care   Result Value Ref Range    INR 2.3 (H) 0.9 - 1.1    Narrative    This test is intended for monitoring Coumadin therapy. Results are not accurate in patients with prolonged INR due to factor deficiency.   Hemoglobin A1c   Result Value Ref Range    Hemoglobin A1C 6.4 (H) 0.0 - 5.6 %   CBC with platelets   Result Value " Ref Range    WBC Count 8.4 4.0 - 11.0 10e3/uL    RBC Count 5.26 4.40 - 5.90 10e6/uL    Hemoglobin 15.3 13.3 - 17.7 g/dL    Hematocrit 47.4 40.0 - 53.0 %    MCV 90 78 - 100 fL    MCH 29.1 26.5 - 33.0 pg    MCHC 32.3 31.5 - 36.5 g/dL    RDW 13.8 10.0 - 15.0 %    Platelet Count 224 150 - 450 10e3/uL   Basic metabolic panel   Result Value Ref Range    Sodium 136 136 - 145 mmol/L    Potassium 4.1 3.4 - 5.3 mmol/L    Chloride 98 98 - 107 mmol/L    Carbon Dioxide (CO2) 24 22 - 29 mmol/L    Anion Gap 14 7 - 15 mmol/L    Urea Nitrogen 13.5 8.0 - 23.0 mg/dL    Creatinine 1.05 0.67 - 1.17 mg/dL    Calcium 9.2 8.8 - 10.2 mg/dL    Glucose 133 (H) 70 - 99 mg/dL    GFR Estimate 75 >60 mL/min/1.73m2   N terminal pro BNP outpatient   Result Value Ref Range    N Terminal Pro BNP Outpatient 721 0 - 900 pg/mL     *Note: Due to a large number of results and/or encounters for the requested time period, some results have not been displayed. A complete set of results can be found in Results Review.

## 2022-12-13 NOTE — PROGRESS NOTES
ANTICOAGULATION MANAGEMENT     Clarke Moreira 72 year old male is on warfarin with therapeutic INR result. (Goal INR 2.0-3.0)    Recent labs: (last 7 days)     12/13/22  0940   INR 2.3*       ASSESSMENT       Source(s): Chart Review    Previous INR was Therapeutic last 2(+) visits    Medication, diet, health changes since last INR chart reviewed; none identified     Increased depression    Overdue for INR: last INR 8/24/22 was 2.5            PLAN     Recommended plan for no diet, medication or health factor changes affecting INR     Dosing Instructions: Continue your current warfarin dose with next INR in 6 weeks       Summary  As of 12/13/2022    Full warfarin instructions:  5 mg every Thu; 10 mg all other days; Starting 12/13/2022   Next INR check:  1/24/2023             Detailed voice message left for Clarke with dosing instructions and follow up date.  Left message to check his INR sooner if new meds for depression are started.      Contact [phone] to schedule and with any changes, questions or concerns.     Education provided:     Goal range and lab monitoring: goal range and significance of current result    Contact [phone] with any changes, questions or concerns.     Plan made per [facility] anticoagulation protocol    Beth Jennings, RN  Anticoagulation Clinic  12/13/2022    _______________________________________________________________________     Anticoagulation Episode Summary     Current INR goal:  2.0-3.0   TTR:  67.6 % (5.4 mo)   Target end date:  Indefinite   Send INR reminders to:  [facility]    Indications    Chronic atrial fibrillation (H) [I48.20]           Comments:  Home Care         Anticoagulation Care Providers     Provider Role Specialty Phone number    Matthias Sanchez MD Referring Family Medicine [phone]

## 2022-12-13 NOTE — LETTER
December 14, 2022      Clarke Moreira  [address]  [facility] [id number]        Dear Sid Mir seeing you on Tuesday. Please see below for your test results.  Your A1c (diabetes test) was better. Previous 6.6.  Your blood counts are good  Your kidney and electrolytes are good  Your BNP (heart failure test) is slightly higher than previous. Hopefully, the cardiology clinic will call with an appointment soon.    Resulted Orders   Hemoglobin A1c   Result Value Ref Range    Hemoglobin A1C 6.4 (H) 0.0 - 5.6 %      Comment:      Normal <5.7%   Prediabetes 5.7-6.4%    Diabetes 6.5% or higher     Note: Adopted from ADA consensus guidelines.   CBC with platelets   Result Value Ref Range    WBC Count 8.4 4.0 - 11.0 10e3/uL    RBC Count 5.26 4.40 - 5.90 10e6/uL    Hemoglobin 15.3 13.3 - 17.7 g/dL    Hematocrit 47.4 40.0 - 53.0 %    MCV 90 78 - 100 fL    MCH 29.1 26.5 - 33.0 pg    MCHC 32.3 31.5 - 36.5 g/dL    RDW 13.8 10.0 - 15.0 %    Platelet Count 224 150 - 450 10e3/uL   Basic metabolic panel   Result Value Ref Range    Sodium 136 136 - 145 mmol/L    Potassium 4.1 3.4 - 5.3 mmol/L    Chloride 98 98 - 107 mmol/L    Carbon Dioxide (CO2) 24 22 - 29 mmol/L    Anion Gap 14 7 - 15 mmol/L    Urea Nitrogen 13.5 8.0 - 23.0 mg/dL    Creatinine 1.05 0.67 - 1.17 mg/dL    Calcium 9.2 8.8 - 10.2 mg/dL    Glucose 133 (H) 70 - 99 mg/dL    GFR Estimate 75 >60 mL/min/1.73m2      Comment:      Effective December 21, 2021 eGFRcr in adults is calculated using the 2021 CKD-EPI creatinine equation which includes age and gender (Keerthi et al., NEJM, DOI: 10.1056/JLFDqi9025286)   N terminal pro BNP outpatient   Result Value Ref Range    N Terminal Pro BNP Outpatient 721 0 - 900 pg/mL      Comment:      Reference range shown and results flagged as abnormal are for the outpatient, non acute settings. Establishing a baseline value for each individual patient is useful for follow-up.    Suggested inpatient cut points for  confirming diagnosis of CHF in an acute setting are:  >450 pg/mL (age 18 to less than 50)  >900 pg/mL (age 50 to less than 75)  >1800 pg/mL (75 yrs and older)    An inpatient or emergency department NT-proPBNP <300 pg/mL effectively rules out acute CHF, with 99% negative predictive value.               Sincerely,    Matthias Sanchez MD

## 2022-12-22 ENCOUNTER — VIRTUAL VISIT (OUTPATIENT)
Dept: PSYCHOLOGY | Facility: CLINIC | Age: 72
End: 2022-12-22
Payer: COMMERCIAL

## 2022-12-22 DIAGNOSIS — F33.1 MODERATE EPISODE OF RECURRENT MAJOR DEPRESSIVE DISORDER (H): Primary | ICD-10-CM

## 2022-12-22 DIAGNOSIS — F41.1 GAD (GENERALIZED ANXIETY DISORDER): ICD-10-CM

## 2022-12-22 PROCEDURE — 90834 PSYTX W PT 45 MINUTES: CPT | Mod: 95 | Performed by: PSYCHOLOGIST

## 2022-12-22 NOTE — PROGRESS NOTES
Telemedicine Visit: The patient's condition can be safely assessed and treated via audio only encounter.      Reason for Telemedicine Visit: Patient unable to travel    Originating Site (Patient Location): Patient's home    Distant Location (provider location):  On-site    Consent:  The patient/guardian has verbally consented to: the potential risks and benefits of telemedicine (video visit) versus in person care; bill my insurance or make self-payment for services provided; and responsibility for payment of non-covered services.     Mode of Communication:  telephone    As the provider I attest to compliance with applicable laws and regulations related to telemedicine.    Behavioral Health Progress Note    Client's Legal Name: Clarke Moreira    Client's Preferred Name: Clarke  YOB: 1950  Type of Service: telephone, counseling  Length of Service:   Start time: 9:00  End time: 9:47  Duration: 47 minutes  Attendees: patient    Identifying Information and Presenting Problem:    The patient is a 72 year old American male who I have seen off and on for therapy over the past couple of years.  Clarke did have difficulties at times coming into clinic for appointments as the anxiety would often lead him to cancel appointments.  Since the pandemic started and we have been doing phone visits, Clarke reports feeling much more comfortable in all of his appointments when they are done via telephone than when he is in person.  In person appointments with all providers have always caused significant distress due to his desire to be perceived in a particular way.  On the telephone, he feels as if he can better articulate his experience and what he is thinking as he feels less need to project a particular image to those that are caring for him.  Anxiety has kept Clarke from seeking medical care that he needs when he notices exacerbations of conditions.  We have been focused on increasing skills to cope with  "anxiety so that he can stay appropriately engaged in needed healthcare, as well as continuing to challenge very embedded negative core beliefs about himself.     Treatment Objective(s) Addressed in This Session:    Clarke will practice a healthy daily discipline of diet and exercise.  Clarke will learn to manage anxiety so he can make and keep appointments     Progress on / Status of Treatment Objective(s) / Homework:  Reports he has been in a \"dark place\" the last few months    PHQ-9:   PHQ 12/13/2022 11/9/2020 1/6/2020   PHQ-9 Total Score 18 22 21   Q9: Thoughts of better off dead/self-harm past 2 weeks Several days More than half the days Several days   F/U: Thoughts of suicide or self-harm - - No   F/U: Safety concerns - - No      CHRIS-7:   CHRIS-7 SCORE 9/3/2019 5/10/2019 12/11/2018   Total Score - - -   Total Score 19 13 20     Topics Discussed/Interventions Provided:  Angel informed me that his name is actually spelled with an ea instead of an ae.  His chart is incorrect. For that reason, I will try to use the correct spelling of his name as I write.    Angel shares that he has been in a tough spot for the last few months - very invested in the mid-term elections.  Feeling very dark and hopeless about the state of our world.  He states \"I don't want to live in this world telling stories to each other and ourselves about how superior we are instead of life affirming things.\"  These thoughts have led to some increased intensity to his ever present suicidal ideation.  At the same time, there have been positive interactions he has had with people and when this happens, he thinks \"this is why I need to still be here.\"  Session focused on the struggles related to holding those two truths at the same time - that the world can be an ugly hard place and that humans can also be incredible.  Explored how Angel's tendency to isolate and avoid, can lead to an emphasis on the view that the world is terrible, as he " "does not have an opportunity then to have the other experiences.  Angel identified that he has noticed that when he does go out or reach out to people, those interactions are typically really meaningful to him.  The intensity of that meaningfulness can also lead to him wanting to pull back as he doesn't always know what to do with those intense emotions.  Worries that he will \"mess things up\" somehow.  Challenged/reframed self-deprecating thoughts.  Provided positive feedback regarding seeing Dr. Sanchez for a visit.  Angel would like to discuss Dr. Sanchez's [facility] more at our next visit.    Assessment:   The patient appeared to be active and engaged in today's session and was receptive to feedback.     Mental Status:   During our visit today, Clarke was open, easy to engage with and respectful. He was alert and oriented to person, place, time, and situation.  Unable to assess appearance as this was a phone visit.  Speech was his normal rate and rhythm.  Mood was described as \"depressed\", affect was mood-congruent. Thought processes were logical and coherent.  Thought content includes ongoing self-deprecating thoughts.  Continues to have suicidal thoughts, but denies any intent or plan to harm himself.  Memory appeared grossly intact with no formal evaluation. Insight and judgment were good.  Patient exhibited good impulse control during the appointment.     Does the patient appear to be at imminent risk of harm to self/others at this time? No    The session was necessary to address vicious cycles around anxiety, avoidance, and view of the world related to anxious and depressive thoughts.  Ongoing depressive symptoms and low self-view continue to interfere with patient's ability to function in areas related to social relationships day to day self care or health behaviors chronic disease management household management. Ongoing psychotherapy is necessary to stabilize mood, provide counseling, improve " functioning with daily activities and provide support.    DSM-5 Diagnosis:  Major Depression, recurrent, moderate  Generalized Anxiety Disorder  R/o persistent depressive disorder    Plan:  - next visit scheduled for 1/13 at 8:30 am  - update diagnostic assessment and treatment plan at our next visit      Lety Buenrostro PsyD    NOTE: Treatment plan update needed on 3/10/21.  Diagnostic assessment update due 10/15/19.

## 2023-01-02 DIAGNOSIS — K59.00 CONSTIPATION, UNSPECIFIED CONSTIPATION TYPE: ICD-10-CM

## 2023-01-03 RX ORDER — SENNOSIDES AND DOCUSATE SODIUM 8.6; 5 MG/1; MG/1
TABLET ORAL
Qty: 180 TABLET | Refills: 0 | Status: SHIPPED | OUTPATIENT
Start: 2023-01-03 | End: 2023-04-06

## 2023-01-03 NOTE — TELEPHONE ENCOUNTER
"Last seen 12/13/22    Request for medication refill:  senna-docusate (SENOKOT-S/PERICOLACE) 8.6-50 MG tablet  Providers if patient needs an appointment and you are willing to give a one month supply please refill for one month and  send a letter/MyChart using \".SMILLIMITEDREFILL\" .smillimited and route chart to \"P SMI \" (Giving one month refill in non controlled medications is strongly recommended before denial)    If refill has been denied, meaning absolutely no refills without visit, please complete the smart phrase \".smirxrefuse\" and route it to the \"P SMI MED REFILLS\"  pool to inform the patient and the pharmacy.    Teodora Webber RN        "

## 2023-01-13 ENCOUNTER — VIRTUAL VISIT (OUTPATIENT)
Dept: PSYCHOLOGY | Facility: CLINIC | Age: 73
End: 2023-01-13
Payer: COMMERCIAL

## 2023-01-13 ENCOUNTER — PRE VISIT (OUTPATIENT)
Dept: CARDIOLOGY | Facility: CLINIC | Age: 73
End: 2023-01-13

## 2023-01-13 DIAGNOSIS — I50.22 CHRONIC SYSTOLIC CONGESTIVE HEART FAILURE (H): Primary | ICD-10-CM

## 2023-01-13 DIAGNOSIS — F41.1 GAD (GENERALIZED ANXIETY DISORDER): ICD-10-CM

## 2023-01-13 DIAGNOSIS — F33.1 MODERATE EPISODE OF RECURRENT MAJOR DEPRESSIVE DISORDER (H): Primary | ICD-10-CM

## 2023-01-13 PROCEDURE — 90834 PSYTX W PT 45 MINUTES: CPT | Mod: 95 | Performed by: PSYCHOLOGIST

## 2023-01-13 NOTE — PROGRESS NOTES
Telemedicine Visit: The patient's condition can be safely assessed and treated via audio only encounter.      Reason for Telemedicine Visit: Patient unable to travel    Originating Site (Patient Location): Patient's home    Distant Location (provider location):  On-site    Consent:  The patient/guardian has verbally consented to: the potential risks and benefits of telemedicine (video visit) versus in person care; bill my insurance or make self-payment for services provided; and responsibility for payment of non-covered services.     Mode of Communication:  telephone    As the provider I attest to compliance with applicable laws and regulations related to telemedicine.    Behavioral Health Progress Note    Client's Legal Name: Clarke Moreira    Client's Preferred Name: Clarke  YOB: 1950  Type of Service: telephone, counseling  Length of Service:   Start time: 8:32  End time: 9:15  Duration: 43 minutes  Attendees: patient    Identifying Information and Presenting Problem:    The patient is a 72 year old American male who I have seen off and on for therapy over the past couple of years.  Clarke did have difficulties at times coming into clinic for appointments as the anxiety would often lead him to cancel appointments.  Since the pandemic started and we have been doing phone visits, Clarke reports feeling much more comfortable in all of his appointments when they are done via telephone than when he is in person.  In person appointments with all providers have always caused significant distress due to his desire to be perceived in a particular way.  On the telephone, he feels as if he can better articulate his experience and what he is thinking as he feels less need to project a particular image to those that are caring for him.  Anxiety has kept Clarke from seeking medical care that he needs when he notices exacerbations of conditions.  We have been focused on increasing skills to cope with  "anxiety so that he can stay appropriately engaged in needed healthcare, as well as continuing to challenge very embedded negative core beliefs about himself.     Treatment Objective(s) Addressed in This Session:    Clarke will practice a healthy daily discipline of diet and exercise.  Clarke will learn to manage anxiety so he can make and keep appointments     Progress on / Status of Treatment Objective(s) / Homework:  Reports a somewhat improved mood.    PHQ-9:   PHQ 12/13/2022 11/9/2020 1/6/2020   PHQ-9 Total Score 18 22 21   Q9: Thoughts of better off dead/self-harm past 2 weeks Several days More than half the days Several days   F/U: Thoughts of suicide or self-harm - - No   F/U: Safety concerns - - No      CHRIS-7:   CHRIS-7 SCORE 9/3/2019 5/10/2019 12/11/2018   Total Score - - -   Total Score 19 13 20     Topics Discussed/Interventions Provided:  Clarke shares that overall he has been doing OK.  Mood has been somewhat better, more uplifted.  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 This signifies some positive progress forward to him.  Clarke shared some interpersonal situations where he was able to take steps to reach out despite feeling worried/anxious about this.  Less self-deprecating thoughts, although continue to still be present. Challenged/reframed thoughts during our session today.  Would like to talk about Dr. Sanchez's care home at an upcoming visit.    Assessment:   The patient appeared to be active and engaged in today's session and was receptive to feedback.     Mental Status:   During our visit today, Clarke was open, pleasant and respectful. He was alert and oriented to person, place, time, and situation.  Unable to assess appearance as this was a phone visit.  Speech was his normal rate and rhythm.  Mood was described as \"OK\", affect was " mood-congruent. Thought processes were logical and coherent.  Thought content includes ongoing self-deprecating thoughts, but was more hopeful at this visit  Suicidal thoughts have been less present, denies any intent or plan to harm himself.  Memory appeared grossly intact with no formal evaluation. Insight and judgment were good.  Patient exhibited good impulse control during the appointment.     Does the patient appear to be at imminent risk of harm to self/others at this time? No    The session was necessary to address factors contributing to increased feelings of pleasure/enjoyment in his life and decreased mood symptoms. Ongoing depressive symptoms and low self-view continue to interfere with patient's ability to function in areas related to social relationships day to day self care or health behaviors chronic disease management household management. Ongoing psychotherapy is necessary to stabilize mood, provide counseling, improve functioning with daily activities and provide support.    DSM-5 Diagnosis:  Major Depression, recurrent, moderate  Generalized Anxiety Disorder  R/o persistent depressive disorder    Plan:  - next visit scheduled for 1/25 at 8:30 am  - update diagnostic assessment and treatment plan      Lety Buenrostro PsyD    NOTE: Treatment plan update needed on 3/10/21.  Diagnostic assessment update due 10/15/19.

## 2023-01-25 ENCOUNTER — VIRTUAL VISIT (OUTPATIENT)
Dept: PSYCHOLOGY | Facility: CLINIC | Age: 73
End: 2023-01-25
Payer: COMMERCIAL

## 2023-01-25 DIAGNOSIS — F33.1 MODERATE EPISODE OF RECURRENT MAJOR DEPRESSIVE DISORDER (H): ICD-10-CM

## 2023-01-25 DIAGNOSIS — F41.1 GAD (GENERALIZED ANXIETY DISORDER): ICD-10-CM

## 2023-01-25 DIAGNOSIS — F34.1 PERSISTENT DEPRESSIVE DISORDER: Primary | ICD-10-CM

## 2023-01-25 PROCEDURE — 90791 PSYCH DIAGNOSTIC EVALUATION: CPT | Mod: 95 | Performed by: PSYCHOLOGIST

## 2023-01-25 NOTE — PATIENT INSTRUCTIONS
Treatment Plan    Client's Legal Name: Clarke Moreira    Client's Preferred Name:Clarke  YOB: 1950  Today's Date: January 25, 2023    Date Diagnostic Update Due: January 25, 2024    DSM-V Diagnoses:  Major Depression, recurrent, moderate  Generalized Anxiety Disorder  R/o persistent depressive disorder    Psychosocial / Contextual Factors:   The patient's mental health concerns have been continuing to affect his ability to function at home and medically and have been causing clinically significant distress.    PHQ-9 SCORE 1/6/2020 11/9/2020 12/13/2022   PHQ-9 Total Score - - -   PHQ-9 Total Score 21 22 18     CHRIS-7 SCORE 12/11/2018 5/10/2019 9/3/2019   Total Score - - -   Total Score 20 13 19       Collaboration: Matthias Sacnhez    Referral: not at this time    Anticipated treatment duration: Unknown  Agreed upon meeting frequency: every 2-3 weeks    Long Term Treatment Goal(s) related to diagnosis / functional impairment(s)  Goal 1: Clarke will learn/use coping skills to continue to reduce intensity/frequency of suicidal thoughts    Intervention(s)  Therapist will provide support, psychoeducation and homework assignments as needed.    Goal 2: Clarke will challenge/reframe negative harsh thoughts that he has about himself.     Intervention(s)  Therapist will provide support, psychoeducation and homework assignments as needed.    If you need additional support and care during times that your therapist or PCP are not available, here are some additional resources for you:    Help in a Crisis:  If you need additional support and care during times that your therapist or PCP are not available, here are some additional resources for you:    988 - National Suicide Prevention Lifeline  COPE ([facility] Mobile Response Team):  [phone]   [facility] Multilingual Crisis Line:  [phone]    [facility]: [phone]    (Emergency Psychiatric Evaluation)    [place]  [facility] [place]-in and crisis [place]  [address]  [place], [place]  [phone]    Crisis Text Line: Text [place] to 084514. Free support at your fingertips 24/7  People who text [place] to 969449 will be connected with a counselor. Crisis Text Line is available 24 hours a day, seven days a week.    If you feel at risk of immediate harm, go directly to the Emergency Department.    Patient has reviewed and agreed to the above plan.    Lety Buenrostro PsyD  January 25, 2023      ______________________________    ________  Patient Signature       Date    ______________________________    ________  Provider Signature       Date
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
## 2023-02-03 ENCOUNTER — TELEPHONE (OUTPATIENT)
Dept: ANTICOAGULATION | Facility: CLINIC | Age: 73
End: 2023-02-03
Payer: COMMERCIAL

## 2023-02-03 NOTE — TELEPHONE ENCOUNTER
ANTICOAGULATION     Clarke Moreira is overdue for INR check.      Spoke with Clarke who declined to schedule a lab appointment at this time. If calling back please schedule as soon as possible.    Yamila Moore RN

## 2023-02-05 NOTE — PROGRESS NOTES
Chief Complaint   Patient presents with     Derm Problem     Patient is here today for bolster removal, no complaints.      Brit Pichardo LPN     Pleural effusion, right

## 2023-02-08 ENCOUNTER — VIRTUAL VISIT (OUTPATIENT)
Dept: PSYCHOLOGY | Facility: CLINIC | Age: 73
End: 2023-02-08
Payer: COMMERCIAL

## 2023-02-08 DIAGNOSIS — F41.1 GAD (GENERALIZED ANXIETY DISORDER): ICD-10-CM

## 2023-02-08 DIAGNOSIS — F33.1 MODERATE EPISODE OF RECURRENT MAJOR DEPRESSIVE DISORDER (H): ICD-10-CM

## 2023-02-08 DIAGNOSIS — F34.1 PERSISTENT DEPRESSIVE DISORDER: Primary | ICD-10-CM

## 2023-02-08 PROCEDURE — 90834 PSYTX W PT 45 MINUTES: CPT | Mod: 95 | Performed by: PSYCHOLOGIST

## 2023-02-08 NOTE — PROGRESS NOTES
Telemedicine Visit: The patient's condition can be safely assessed and treated via audio only encounter.      Reason for Telemedicine Visit: Patient unable to travel    Originating Site (Patient Location): Patient's home    Distant Location (provider location):  On-site    Consent:  The patient/guardian has verbally consented to: the potential risks and benefits of telemedicine (video visit) versus in person care; bill my insurance or make self-payment for services provided; and responsibility for payment of non-covered services.     Mode of Communication: Telephone    As the provider I attest to compliance with applicable laws and regulations related to telemedicine.    Behavioral Health Progress Note    Client's Legal Name: Clarke Moreira    Client's Preferred Name: Clarke  YOB: 1950  Type of Service: telephone  Length of Service:   Start time: 8:36    End time:9:22  Duration: 46 minutes  Attendees: patient    Identifying Information and Presenting Problem:  Clarke is a 72 year old male who is being seen for persistent symptoms of depression, low self-view, and anxiety that impacts his ability to interact with other people, as well as obtain the medical care that is needed to manage his chronic health conditions.    Treatment Objective(s) Addressed in This Session:  Goal 1: Clarke will learn/use coping skills to continue to reduce intensity/frequency of suicidal thoughts     Goal 2: Clarke will challenge/reframe negative harsh thoughts that he has about himself.      Progress on / Status of Treatment Objective(s) / Homework:  Minimal progress     Mental Health Screening Questionnaires:  PHQ-9:   PHQ 1/6/2020 11/9/2020 12/13/2022   PHQ-9 Total Score 21 22 18   Q9: Thoughts of better off dead/self-harm past 2 weeks Several days More than half the days Several days   F/U: Thoughts of suicide or self-harm No - -   F/U: Safety concerns No - -      CHRIS-7:   CHRIS-7 SCORE 12/11/2018 5/10/2019  "9/3/2019   Total Score - - -   Total Score 20 13 19     Topics Discussed/Interventions Provided:  Todays session focused on the impact particular stressors have on Clarke's anxiety, ruminative thoughts, and low self-view.  He has been having a difficult time getting things done because he has been so anxious about his upcoming housing inspection. Has many fearful thoughts that he will not pass the inspection and lose his housing. This then leads to anger and resentment directed at himself for living a life that has put him in a place where he is vulnerable and subjected to being in subsidized housing that requires inspections.  Challenged and reframed unhelpful thoughts.  Discussed his tendency to see only one theme in the narrative of his life (failure) but we also spent time identifying other parts of his story that are not reflected in that narrative (I.e. has maintained this housing for 26 years, learning to use ipad, resourceful in finding ways to complete tasks around the house, friends that he made many years ago that continue to desire contact with him.)     Assessment:   The patient appeared to be active and engaged in today's session and was receptive to feedback.     Mental Status Exam:  During interaction with the provider today, Clarke was open and pleasant. Patient was generally alert and oriented to person, place, time, and situation. Unable to assess appearance due to phone visit.  Speech was normal limits tone, rate, volume; largely coherent and relevant to topic. Mood was \"okay\"; affect appropriate and mood-congruent. Thought processes: logical, coherent and goal oriented. Thought content: no evidence of psychotic features and easily dismissed passive thoughts of death with no other self-directed violence related thinking Intent, urges, planning, behavior/recent attempts. Memory appeared grossly intact without being formally evaluated. Insight: good. Judgment good. Patient exhibited good impulse " control during the appointment.     Does the patient appear to be at imminent risk of harm to self/others at this time? No    The session was necessary to address ongoing negative unhelpful thoughts that lead to exacerbation of anxiety and depressive symptoms that have been interfering with patient's ability to function in areas related to interacting and relating with others, social relationships, chronic disease management and attending and completing tasks. Ongoing psychotherapy is necessary to stabilize mood, provide counseling and provide support.    DSM-5-TR Diagnosis:  Persistent Depressive Disorder  Major Depression, recurrent, moderate  Generalized Anxiety Disorder    Plan:  1. Follow up with this provider 2/22  2. Recommend he continue to follow with PCP    Lety Buenrostro PsyD, JIN    NOTE: Treatment plan update due 7/25/23.  Diagnostic assessment update due 1/25/24.

## 2023-02-10 ENCOUNTER — TELEPHONE (OUTPATIENT)
Dept: ANTICOAGULATION | Facility: CLINIC | Age: 73
End: 2023-02-10
Payer: COMMERCIAL

## 2023-02-10 NOTE — TELEPHONE ENCOUNTER
ANTICOAGULATION     Clarke Moreira is overdue for INR check.      Spoke with Clarke who declined to schedule a lab appointment at this time. If calling back please schedule as soon as possible.    - lot's of things going on.   - he will call next week to include an INR with his appt    Mirna Mcknight RN

## 2023-02-17 ENCOUNTER — TELEPHONE (OUTPATIENT)
Dept: ANTICOAGULATION | Facility: CLINIC | Age: 73
End: 2023-02-17
Payer: COMMERCIAL

## 2023-02-17 NOTE — TELEPHONE ENCOUNTER
ANTICOAGULATION     Clarke Moreira is overdue for INR check.      Reminder letter sent    Mirna Mcknight RN

## 2023-02-17 NOTE — LETTER
[facility]  [address]  [facility] [address]  Phone: [phone]  Fax: [phone]   February 17, 2023        Clarke Moreira  [address]  [facility] [address]            Dear Clarke,    You are currently under the care of [facility] for your warfarin (Coumadin , Jantoven ) therapy.  We are contacting you because our records show you were due for an INR on 1/24/23    Last INR was in 12/13/22.    There are potentially serious risks when taking warfarin without careful monitoring and we want to make sure you are safely managed.  Routine lab monitoring is required for warfarin refills.     Please call [phone] as soon as possible to schedule an appointment.  If there has been a change in your care or other concerns, please let us know so we can help and or update our records.     Sincerely,       [facility]

## 2023-02-22 ENCOUNTER — VIRTUAL VISIT (OUTPATIENT)
Dept: PSYCHOLOGY | Facility: CLINIC | Age: 73
End: 2023-02-22
Payer: COMMERCIAL

## 2023-02-22 DIAGNOSIS — F34.1 PERSISTENT DEPRESSIVE DISORDER: Primary | ICD-10-CM

## 2023-02-22 DIAGNOSIS — F33.1 MODERATE EPISODE OF RECURRENT MAJOR DEPRESSIVE DISORDER (H): ICD-10-CM

## 2023-02-22 DIAGNOSIS — F41.1 GAD (GENERALIZED ANXIETY DISORDER): ICD-10-CM

## 2023-02-22 PROCEDURE — 90834 PSYTX W PT 45 MINUTES: CPT | Mod: 95 | Performed by: PSYCHOLOGIST

## 2023-02-22 NOTE — PROGRESS NOTES
Telemedicine Visit: The patient's condition can be safely assessed and treated via audio only encounter.      Reason for Telemedicine Visit: Patient unable to travel    Originating Site (Patient Location): Patient's home    Distant Location (provider location):  On-site    Consent:  The patient/guardian has verbally consented to: the potential risks and benefits of telemedicine (video visit) versus in person care; bill my insurance or make self-payment for services provided; and responsibility for payment of non-covered services.     Mode of Communication:   Telephone visit    As the provider I attest to compliance with applicable laws and regulations related to telemedicine.    Behavioral Health Progress Note    Client's Legal Name: Clarke Moreira    Client's Preferred Name: Clarke  YOB: 1950  Type of Service: telephone  Length of Service:   Start time: 9:02    End time: 9:54  Duration: 52 minutes  Attendees: patient    Identifying Information and Presenting Problem:  Clarke is a 72 year old male who is being seen for persistent symptoms of depression, low self-view, and anxiety that impacts his ability to interact with other people, as well as obtain the medical care that is needed to manage his chronic health conditions.    Treatment Objective(s) Addressed in This Session:  Goal 1: Clarke will learn/use coping skills to continue to reduce intensity/frequency of suicidal thoughts     Goal 2: Clarke will challenge/reframe negative harsh thoughts that he has about himself.      Progress on / Status of Treatment Objective(s) / Homework:  Reports mood symptoms are about the same, has seemed to be feeling more hopeful based on our conversations in session.    Mental Health Screening Questionnaires:  PHQ-9:   PHQ 1/6/2020 11/9/2020 12/13/2022   PHQ-9 Total Score 21 22 18   Q9: Thoughts of better off dead/self-harm past 2 weeks Several days More than half the days Several days   F/U: Thoughts of  "suicide or self-harm No - -   F/U: Safety concerns No - -      CHRIS-7:   CHRIS-7 SCORE 12/11/2018 5/10/2019 9/3/2019   Total Score - - -   Total Score 20 13 19     Topics Discussed/Interventions Provided:  Has been feeling a little more connected with the world and hopeful about the world. Depression is still there, but not feeling as oppressive as it often feels.  Watches legislative session and feels like things are happening to improve life for [place].  Finds it helpful to observe how people may disagree with each other, but are able to express themselves.  This helps him think of ways he can bring up or express things he is thinking/feeling. Somewhat more connection as has been having regular phone calls with his friend that lives in [place].  Would like to be able to call her as well, but doesn't have long distance. Discussed some options that could help Clarke with long-distance access.     Assessment:   The patient appeared to be active and engaged in today's session and was receptive to feedback.     Mental Status Exam:  During interaction with the provider today, Clarke was cooperative, open and easy to engage with. Patient was generally alert and oriented to all spheres. Unable to assess appearance due to phone visit.  Speech was normal tone, rate, volume; coherent and relevant to topic. Mood was \"same\"; affect was appropriate and mood-congruent. Thought processes: logical and coherent. Thought content: no evidence of psychotic features and easily dismissed passive thoughts of death with no other self-directed violence related thinking Intent, urges, planning, behavior/recent attempts. Memory appeared grossly intact without being formally evaluated. Insight: good. Judgment good. Patient exhibited good impulse control during the appointment.     Does the patient appear to be at imminent risk of harm to self/others at this time? No    The session was necessary to address ongoing negative unhelpful " "thoughts that lead to exacerbation of anxiety and depressive symptoms that have been interfering with patient's ability to function in areas related to interacting and relating with others, social relationships, chronic disease management and attending and completing tasks. Ongoing psychotherapy is necessary to stabilize mood, provide counseling and provide support.    DSM-5-TR Diagnosis:  Persistent Depressive Disorder  Major Depression, recurrent, moderate  Generalized Anxiety Disorder    Plan:  1. Follow up with this provider 3/9  2. Recommend he continue to follow with PCP, Clarke states he will make an appointment \"at some point\"    Lety Buenrostro PsyD, LP    NOTE: Treatment plan update due 7/25/23.  Diagnostic assessment update due 1/25/24.    "

## 2023-03-03 ENCOUNTER — TELEPHONE (OUTPATIENT)
Dept: ANTICOAGULATION | Facility: CLINIC | Age: 73
End: 2023-03-03
Payer: COMMERCIAL

## 2023-03-03 NOTE — TELEPHONE ENCOUNTER
Anticoagulation clinic notificiation    Dr. Sanchez,    Your patient, Clarke Moreira, is past due for an INR. Their last result was 2.3 on 12/13/23 and was due to come back on 1/24/23.    Clarke Moreira received phone calls and letters over the last several weeks in attempt to arrange a follow up appointment.  Clarke Moreira will be sent another letter today.     No action is required from you unless you have additional information or if you would like to reach out personally to Clarke Moreira.    Please don t hesitate to contact the Anticoagulation Management Program if you have any questions or concerns.     Sincerely,     [facility]

## 2023-03-07 ENCOUNTER — DOCUMENTATION ONLY (OUTPATIENT)
Dept: FAMILY MEDICINE | Facility: CLINIC | Age: 73
End: 2023-03-07
Payer: COMMERCIAL

## 2023-03-07 DIAGNOSIS — Z79.01 LONG TERM (CURRENT) USE OF ANTICOAGULANTS: ICD-10-CM

## 2023-03-07 DIAGNOSIS — I48.20 CHRONIC ATRIAL FIBRILLATION (H): Primary | ICD-10-CM

## 2023-03-07 DIAGNOSIS — I48.0 PAROXYSMAL ATRIAL FIBRILLATION (H): ICD-10-CM

## 2023-03-07 NOTE — PROGRESS NOTES
ANTICOAGULATION CLINIC REFERRAL RENEWAL REQUEST       An annual renewal order is required for all patients referred to [facility].?  Please review and sign the pended referral order for Clarke Moreira.       ANTICOAGULATION SUMMARY      Warfarin indication(s)   Atrial Fibrillation    Mechanical heart valve present?  NO       Current goal range   INR: 2.0-3.0     Goal appropriate for indication? Goal INR 2-3, standard for indication(s) above     Time in Therapeutic Range (TTR)  (Goal > 60%) 67.6 %       Office visit with referring provider's group within last year Yes on 12/13/2022       Mirna Mcknight RN  [facility]

## 2023-03-09 ENCOUNTER — VIRTUAL VISIT (OUTPATIENT)
Dept: PSYCHOLOGY | Facility: CLINIC | Age: 73
End: 2023-03-09
Payer: COMMERCIAL

## 2023-03-09 DIAGNOSIS — F34.1 PERSISTENT DEPRESSIVE DISORDER: Primary | ICD-10-CM

## 2023-03-09 DIAGNOSIS — F33.1 MODERATE EPISODE OF RECURRENT MAJOR DEPRESSIVE DISORDER (H): ICD-10-CM

## 2023-03-09 DIAGNOSIS — F41.1 GAD (GENERALIZED ANXIETY DISORDER): ICD-10-CM

## 2023-03-09 PROBLEM — I48.0 PAROXYSMAL ATRIAL FIBRILLATION (H): Status: ACTIVE | Noted: 2023-03-09

## 2023-03-09 PROBLEM — Z79.01 LONG TERM (CURRENT) USE OF ANTICOAGULANTS: Status: ACTIVE | Noted: 2023-03-09

## 2023-03-09 PROCEDURE — 90834 PSYTX W PT 45 MINUTES: CPT | Mod: 95 | Performed by: PSYCHOLOGIST

## 2023-03-09 NOTE — PROGRESS NOTES
Telemedicine Visit: The patient's condition can be safely assessed and treated via audio only encounter.      Reason for Telemedicine Visit: Patient unable to travel    Originating Site (Patient Location): Patient's home    Distant Location (provider location):  On-site    Consent:  The patient/guardian has verbally consented to: the potential risks and benefits of telemedicine (video visit) versus in person care; bill my insurance or make self-payment for services provided; and responsibility for payment of non-covered services.     Mode of Communication:   Telephone visit    As the provider I attest to compliance with applicable laws and regulations related to telemedicine.    Behavioral Health Progress Note    Client's Legal Name: Clarke Moreira    Client's Preferred Name: Clarke  YOB: 1950  Type of Service: telephone  Length of Service:   Start time: 8:22    End time: 9:08  Duration: 46 minutes  Attendees: patient    Identifying Information and Presenting Problem:  Clarke is a 72 year old male who is being seen for persistent symptoms of depression, low self-view, and anxiety that impacts his ability to interact with other people, as well as obtain the medical care that is needed to manage his chronic health conditions.    Treatment Objective(s) Addressed in This Session:  Goal 1: Clarke will learn/use coping skills to continue to reduce intensity/frequency of suicidal thoughts     Goal 2: Clarke will challenge/reframe negative harsh thoughts that he has about himself.      Progress on / Status of Treatment Objective(s) / Homework:  Stable     Mental Health Screening Questionnaires:  PHQ-9:   PHQ 1/6/2020 11/9/2020 12/13/2022   PHQ-9 Total Score 21 22 18   Q9: Thoughts of better off dead/self-harm past 2 weeks Several days More than half the days Several days   F/U: Thoughts of suicide or self-harm No - -   F/U: Safety concerns No - -      CHRIS-7:   CHRIS-7 SCORE 12/11/2018 5/10/2019 9/3/2019  "  Total Score - - -   Total Score 20 13 19     Topics Discussed/Interventions Provided:  Session focused on an encounter he had with the couple that brings him groceries.  After having a conversation with them, Clarke reports feeling desperate, foolish, and shallow.  Examined the thoughts he was having about himself in this situation further. Identified evidence for/against these thoughts.  Discussed ways in which he might bring a little more socar/compassion toward himself.      Assessment:   The patient appeared to be active and engaged in today's session and was receptive to feedback.     Mental Status Exam:  During his visit today, Clarke was talkative, open, and easy to engage with.  He was alert and oriented to all spheres. Unable to assess appearance due to phone visit.  Speech was his baseline rate and rhythm. Mood was \"down\"; affect was mood-congruent. Thought processes were logical and coherent. Thought content: no evidence of psychotic features and easily dismissed passive thoughts of death with no other self-directed violence related thinking Intent, urges, planning, behavior/recent attempts. Memory appeared grossly intact without being formally evaluated. Insight: good. Judgment good. Patient exhibited good impulse control during the appointment.     Does the patient appear to be at imminent risk of harm to self/others at this time? No    The session was necessary to address negative thoughts he has about himself that contribute significantly to the ongoing depressive symptoms he is experiencing and interfere with patient's ability to function in areas related to interacting and relating with others, social relationships, chronic disease management and attending and completing tasks. Ongoing psychotherapy is necessary to stabilize mood, provide counseling and provide support.    DSM-5-TR Diagnosis:  Persistent Depressive Disorder  Major Depression, recurrent, moderate  Generalized Anxiety " "Disorder    Plan:  1. Follow up with this provider 4/6  2. Recommend he continue to follow with PCP\"    Lety Buenrostro PsyD, LP    NOTE: Treatment plan update due 7/25/23.  Diagnostic assessment update due 1/25/24.  "

## 2023-03-13 DIAGNOSIS — E11.9 TYPE 2 DIABETES MELLITUS WITHOUT COMPLICATION, WITHOUT LONG-TERM CURRENT USE OF INSULIN (H): ICD-10-CM

## 2023-03-13 RX ORDER — ATORVASTATIN CALCIUM 40 MG/1
40 TABLET, FILM COATED ORAL DAILY
Qty: 90 TABLET | Refills: 3 | Status: SHIPPED | OUTPATIENT
Start: 2023-03-13 | End: 2024-02-19

## 2023-03-13 NOTE — TELEPHONE ENCOUNTER
"Request for medication refill: atorvastatin (LIPITOR) 40 MG tablet  metFORMIN (GLUCOPHAGE) 500 MG tablet    Providers if patient needs an appointment and you are willing to give a one month supply please refill for one month and  send a letter/MyChart using \".SMILLIMITEDREFILL\" .smillimited and route chart to \"P [facility] \" (Giving one month refill in non controlled medications is strongly recommended before denial)    If refill has been denied, meaning absolutely no refills without visit, please complete the smart phrase \".smirxrefuse\" and route it to the \"P [facility] MED REFILLS\"  pool to inform the patient and the pharmacy.    June Moore, CMA      "

## 2023-03-17 ENCOUNTER — TELEPHONE (OUTPATIENT)
Dept: ANTICOAGULATION | Facility: CLINIC | Age: 73
End: 2023-03-17
Payer: COMMERCIAL

## 2023-03-17 NOTE — TELEPHONE ENCOUNTER
ANTICOAGULATION     Clarke Moreiar is overdue for INR check.      Reminder letter sent    Mirna Mcknight RN

## 2023-03-17 NOTE — LETTER
[facility]  [address]  [place] [address]  Phone: [phone]  Fax: [phone]   March 17, 2023        Clarke Moreira  [address]  [place] [address]            Dear Clarke,    You are currently under the care of [facility] for your warfarin (Coumadin , Jantoven ) therapy.  We are contacting you because our records show you were due for an INR on 1/24/23.    Last INR was in 12/13/22.    There are potentially serious risks when taking warfarin without careful monitoring and we want to make sure you are safely managed.  Routine lab monitoring is required for warfarin refills.     Please call [phone] as soon as possible to schedule an appointment.  If there has been a change in your care or other concerns, please let us know so we can help and or update our records.     Sincerely,       [facility]

## 2023-03-20 DIAGNOSIS — I48.19 PERSISTENT ATRIAL FIBRILLATION (H): ICD-10-CM

## 2023-03-20 NOTE — TELEPHONE ENCOUNTER
"Request for medication refill:  warfarin ANTICOAGULANT (COUMADIN) 5 MG tablet    Providers if patient needs an appointment and you are willing to give a one month supply please refill for one month and  send a letter/MyChart using \".SMILLIMITEDREFILL\" .smillimited and route chart to \"[facility] \" (Giving one month refill in non controlled medications is strongly recommended before denial)    If refill has been denied, meaning absolutely no refills without visit, please complete the smart phrase \".smirxrefuse\" and route it to the \"[facility] MED REFILLS\"  pool to inform the patient and the pharmacy.    Kim Giron MA        "

## 2023-03-21 RX ORDER — WARFARIN SODIUM 5 MG/1
TABLET ORAL
Qty: 180 TABLET | Refills: 1 | Status: SHIPPED | OUTPATIENT
Start: 2023-03-21 | End: 2023-09-13

## 2023-04-04 ENCOUNTER — DOCUMENTATION ONLY (OUTPATIENT)
Dept: ANTICOAGULATION | Facility: CLINIC | Age: 73
End: 2023-04-04
Payer: COMMERCIAL

## 2023-04-04 DIAGNOSIS — K59.00 CONSTIPATION, UNSPECIFIED CONSTIPATION TYPE: ICD-10-CM

## 2023-04-04 NOTE — LETTER
[facility]  [address]  [facility] [address]  Phone: [phone]  Fax: [phone]   April 4, 2023        Clarke Moreira  [address]  [place] [address]            Dear Clarke,    You are currently under the care of [facility] for your warfarin (Coumadin , Jantoven ) therapy.  We are contacting you because our records show you were due for an INR on 1/24/2023.    There are potentially serious risks when taking warfarin without careful monitoring and we want to make sure you are safely managed.  Routine lab monitoring is required for warfarin refills.     Please call [phone] as soon as possible to schedule an appointment.  If there has been a change in your care or other concerns, please let us know so we can help and or update our records.     Sincerely,       [facility]

## 2023-04-04 NOTE — PROGRESS NOTES
Anticoagulation clinic notificiation    Dr. Matthias Sanchez,    Your patient, Clarke Moreira, is past due for an INR. Their last result was 2.3 on 12/13/2022 and was due to come back on 1/24/23.    Clarke Moreira received phone calls and letters over the last several weeks in attempt to arrange a follow up appointment.  Clarke Moreira will be sent another letter today.     No action is required from you unless you have additional information or if you would like to reach out personally to Clarke Moreira.    Please don t hesitate to contact the Anticoagulation Management Program if you have any questions or concerns.     Sincerely,     [facility]

## 2023-04-05 NOTE — PROGRESS NOTES
Telemedicine Visit: The patient's condition can be safely assessed and treated via audio only encounter.      Reason for Telemedicine Visit: Patient unable to travel    Originating Site (Patient Location): Patient's home    Distant Location (provider location):  On-site    Consent:  The patient/guardian has verbally consented to: the potential risks and benefits of telemedicine (video visit) versus in person care; bill my insurance or make self-payment for services provided; and responsibility for payment of non-covered services.     Mode of Communication:   Telephone visit    As the provider I attest to compliance with applicable laws and regulations related to telemedicine.    Behavioral Health Progress Note    Client's Legal Name: Clarke Moreira    Client's Preferred Name: Clarke  YOB: 1950  Type of Service: telephone  Length of Service:   Start time: 8:22  End time: 9:05  Duration: 43 minutes  Attendees: patient; Jonny, 3rd year medical student was shadowing during this visit again    Identifying Information and Presenting Problem:  Clarke is a 72 year old male who is being seen for persistent symptoms of depression, low self-view, and anxiety that impacts his ability to interact with other people, as well as obtain the medical care that is needed to manage his chronic health conditions.    Treatment Objective(s) Addressed in This Session:  Goal 1: Clarke will learn/use coping skills to continue to reduce intensity/frequency of suicidal thoughts     Goal 2: Clarke will challenge/reframe negative harsh thoughts that he has about himself.      Progress on / Status of Treatment Objective(s) / Homework:  reports mood symptoms are about the same    Mental Health Screening Questionnaires:  PHQ-9:       1/6/2020    12:19 PM 11/9/2020    11:07 AM 12/13/2022     9:46 AM   PHQ   PHQ-9 Total Score 21 22 18   Q9: Thoughts of better off dead/self-harm past 2 weeks Several days More than half the days  "Several days   F/U: Thoughts of suicide or self-harm No     F/U: Safety concerns No        CHRIS-7:       12/11/2018    10:31 AM 5/10/2019     8:26 AM 9/3/2019    10:35 AM   CHRIS-7 SCORE   Total Score 20 13 19     Topics Discussed/Interventions Provided:  Used CBT to challenge and reframe shame related thoughts.  Used gestalt concepts and guided imagery to help Clarke address unhealed childhood trauma that continues to impact his ability to take needed steps to care for himself in the present.  Decided to cancel the appointment with Dr. Sanchez although he knew this may be his last chance to talk to him before he retired.  Has felt paralyzed in problem-solving what might be additional steps he can take as when he tried to reschedule there were not any appointments available. This has resulted in him feeling ashamed and badly about himself as he will not have the chance to express his thoughts/feelings to Dr. Sanchez for his care. Explored steps he might take to see if there might be an appointment available including reaching out to Dr. Sanchez or to call in for same day visit.      Assessment:   The patient appeared to be active and engaged in today's session and was receptive to feedback.     Mental Status Exam:  During his visit today, Clarke was anxious and easy to engage with.  He was alert and oriented to person, place, time, and situation. Unable to assess appearance due to phone visit.  Speech was his typical rate and rhythm. Mood was \"anxious, down\"; affect was mood-congruent. Thought processes were logical and coherent. Thought content: no evidence of psychotic features and easily dismissed passive thoughts of death with no other self-directed violence related thinking Intent, urges, planning, behavior/recent attempts. Memory appeared grossly intact without being formally evaluated. Insight was fair. Judgment was good. Patient exhibited good impulse control during the appointment.     Does the patient appear " to be at imminent risk of harm to self/others at this time? No    The session was necessary to address dysphoric and shame related thoughts that maintain depressive symptoms.  These symptoms interfere with patient's ability to function in areas related to interacting and relating with others, social relationships, chronic disease management and attending and completing tasks. Ongoing psychotherapy is necessary to stabilize mood, provide counseling and provide support.    DSM-5-TR Diagnosis:  Persistent Depressive Disorder  Major Depression, recurrent, moderate  Generalized Anxiety Disorder    Plan:  1. Follow up on 5/4  2. Clarke will decide what next steps he will take to make an appt with Dr. Laura Buenrostro PsyD, LP    NOTE: Treatment plan update due 7/25/23.  Diagnostic assessment update due 1/25/24.

## 2023-04-05 NOTE — TELEPHONE ENCOUNTER
"Last seen 12/13/22    Request for medication refill:  SM SENNA-S 8.6-50 MG tablet  Providers if patient needs an appointment and you are willing to give a one month supply please refill for one month and  send a letter/MyChart using \".SMILLIMITEDREFILL\" .smillimited and route chart to \"P [facility] \" (Giving one month refill in non controlled medications is strongly recommended before denial)    If refill has been denied, meaning absolutely no refills without visit, please complete the smart phrase \".smirxrefuse\" and route it to the \"P [facility] MED REFILLS\"  pool to inform the patient and the pharmacy.    Teodora Webber RN        "

## 2023-04-06 ENCOUNTER — VIRTUAL VISIT (OUTPATIENT)
Dept: PSYCHOLOGY | Facility: CLINIC | Age: 73
End: 2023-04-06
Payer: COMMERCIAL

## 2023-04-06 DIAGNOSIS — F41.1 GAD (GENERALIZED ANXIETY DISORDER): ICD-10-CM

## 2023-04-06 DIAGNOSIS — F34.1 PERSISTENT DEPRESSIVE DISORDER: Primary | ICD-10-CM

## 2023-04-06 DIAGNOSIS — F33.1 MODERATE EPISODE OF RECURRENT MAJOR DEPRESSIVE DISORDER (H): ICD-10-CM

## 2023-04-06 PROCEDURE — 90834 PSYTX W PT 45 MINUTES: CPT | Mod: 95 | Performed by: PSYCHOLOGIST

## 2023-04-06 RX ORDER — DOCUSATE SODIUM AND SENNOSIDES 50; 8.6 MG/1; MG/1
TABLET ORAL
Qty: 180 TABLET | Refills: 3 | Status: SHIPPED | OUTPATIENT
Start: 2023-04-06 | End: 2024-02-19

## 2023-04-11 NOTE — TELEPHONE ENCOUNTER
Goal Outcome Evaluation:           Progress: improving  Outcome Evaluation: VSS, patient remains intubated with a PEEP of 5 and FiO2@ 35%. Precedex held for espisode of bradycardia but restarted due to patient restlessness and agitation. Can move all extremities but does not do to command always. PERRL 2mm sluggish. 1 small BM, UOP adequate with rasmussen remaining in place. PRN Tylenol given x2 for low grade temp with tmax of 38.2C. TF infusing and hep gtt infusing. Restraints continued. Son updated at bedside.   [facility]    Phone Message    May a detailed message be left on voicemail: yes     Reason for Call: Other: Patient wanted to know if he can keep homecare until next appt. Please call pt back      Action Taken: Message routed to:  [facility] ortho    Travel Screening: Not Applicable

## 2023-04-17 NOTE — TELEPHONE ENCOUNTER
Rx already ordered.  No further action needed.    Negrita Walsh RN  [facility]  [phone]     Peng Advancement Flap Text: Because of the full-thickness nature of the defect and location adjacent to free margin of alar rims, and to maintain functional airway and alar rim position, a bilateral advancement flap was planned. After prep and anesthesia, incisions were extended from the opposite side of the wound along the alar grooves, and Burow???s triangles at the end of each incision were excised.  Two flaps were created by undermining in the subcutaneous plane skeletonizing the nasal cartilages. After hemostasis was obtained, the flaps were advanced and sutured in a layered fashion.

## 2023-04-28 ENCOUNTER — TELEPHONE (OUTPATIENT)
Dept: FAMILY MEDICINE | Facility: CLINIC | Age: 73
End: 2023-04-28
Payer: COMMERCIAL

## 2023-04-28 NOTE — TELEPHONE ENCOUNTER
General Call    Contacts       Type Contact Phone/Fax    04/28/2023 09:29 AM CDT Phone (Incoming) MoreiraClarke allison RENÉE (Self) [phone] (H)        Reason for Call:  INR     What are your questions or concerns:  Pt calling because he received a letter stating he is a few month overdue for an INR check. He wanted to let his team know that he has been struggling with some things. He has scheduled an office appointment at the [facility] next Friday and is planning to have INR drawn while he is there.     Date of last appointment with provider: n/a    Okay to leave a detailed message?: Yes at Cell number on file:    Telephone Information:   Mobile [phone]

## 2023-05-04 ENCOUNTER — VIRTUAL VISIT (OUTPATIENT)
Dept: PSYCHOLOGY | Facility: CLINIC | Age: 73
End: 2023-05-04
Payer: COMMERCIAL

## 2023-05-04 DIAGNOSIS — F33.1 MODERATE EPISODE OF RECURRENT MAJOR DEPRESSIVE DISORDER (H): ICD-10-CM

## 2023-05-04 DIAGNOSIS — F41.1 GAD (GENERALIZED ANXIETY DISORDER): ICD-10-CM

## 2023-05-04 DIAGNOSIS — F34.1 PERSISTENT DEPRESSIVE DISORDER: Primary | ICD-10-CM

## 2023-05-04 PROCEDURE — 90834 PSYTX W PT 45 MINUTES: CPT | Performed by: PSYCHOLOGIST

## 2023-05-04 NOTE — PROGRESS NOTES
Telemedicine Visit: The patient's condition can be safely assessed and treated via audio only encounter.      Reason for Telemedicine Visit: Patient unable to travel    Originating Site (Patient Location): Patient's home    Distant Location (provider location):  On-site    Consent:  The patient/guardian has verbally consented to: the potential risks and benefits of telemedicine (video visit) versus in person care; bill my insurance or make self-payment for services provided; and responsibility for payment of non-covered services.     Mode of Communication:   Telephone visit    As the provider I attest to compliance with applicable laws and regulations related to telemedicine.    Behavioral Health Progress Note    Client's Legal Name: Clarke Moreira    Client's Preferred Name: Clarke  YOB: 1950  Type of Service: telephone  Length of Service:   Start time: 8:20  End time: 9:00  Duration: 40 minutes  Attendees: patient    Identifying Information and Presenting Problem:  Clarke is a 72 year old male who is being seen for persistent symptoms of depression, low self-view, and anxiety that impacts his ability to interact with other people, as well as obtain the medical care that is needed to manage his chronic health conditions.    Treatment Objective(s) Addressed in This Session:  Goal 1: Clarke will learn/use coping skills to continue to reduce intensity/frequency of suicidal thoughts     Goal 2: Clarke will challenge/reframe negative harsh thoughts that he has about himself.      Progress on / Status of Treatment Objective(s) / Homework:  Stable     Mental Health Screening Questionnaires:  PHQ-9:       1/6/2020    12:19 PM 11/9/2020    11:07 AM 12/13/2022     9:46 AM   PHQ   PHQ-9 Total Score 21 22 18   Q9: Thoughts of better off dead/self-harm past 2 weeks Several days More than half the days Several days   F/U: Thoughts of suicide or self-harm No     F/U: Safety concerns No        CHRIS-7:        "12/11/2018    10:31 AM 5/10/2019     8:26 AM 9/3/2019    10:35 AM   CHRIS-7 SCORE   Total Score 20 13 19     Topics Discussed/Interventions Provided:  Session focused on processing Clarke's discomfort regarding having a student observe in the room while we were on the phone visit last week.  Clarke was asked if he would be willing to have a student in the room.  He said yes and also made a joke about it.  He states he realized that by making the joke it made his communication less clear as to what he actually wanted in the situation.  Thanked him for speaking up and letting me know so I can be better attuned in the future.  He also reflected that he can speak more directly if he does not want someone present.      Session also focused on grief/sadness re: his PCP leaving.  Clarke cancelled several appointments and ended up not getting to see him before his [facility].  Did send a card and felt like he was able to express how meaningful their doctor/patient relationship was to him.  Clarke identifies that this is his pattern in many relationships - avoidance and then feeling guilt/regret for not being present.      Assessment:   The patient appeared to be active and engaged in today's session and was receptive to feedback.     Mental Status Exam:  During the session today, Clarke was open, pleasant and respectful.  He was alert and oriented to person, place, time, and situation. Unable to assess appearance due to phone visit.  Speech was Clarke's usual rate and rhythm. Mood was \"the same\"; affect was appropriate. Thought processes were logical and coherent. Thought content: no evidence of psychotic features and ongoing passive thoughts of suicide, but denies any plan or intent.. Memory appeared grossly intact without being formally evaluated. Insight and judgement are adequate.  Patient exhibited good impulse control during the appointment.     Does the patient appear to be at imminent risk of harm to self/others " at this time? No    The session was necessary to address depressed mood and shame related thoughts that maintain depressive symptoms.  These symptoms interfere with patient's ability to function in areas related to interacting and relating with others, social relationships, chronic disease management and attending and completing tasks. Ongoing psychotherapy is necessary to stabilize mood, provide counseling and provide support.    DSM-5-TR Diagnosis:  Persistent Depressive Disorder  Major Depression, recurrent, moderate  Generalized Anxiety Disorder    Plan:  -Follow up on 5/11    Lety Buenrostro PsyD, JIN    NOTE: Treatment plan update due 7/25/23.  Diagnostic assessment update due 1/25/24.

## 2023-05-08 ENCOUNTER — DOCUMENTATION ONLY (OUTPATIENT)
Dept: FAMILY MEDICINE | Facility: CLINIC | Age: 73
End: 2023-05-08
Payer: COMMERCIAL

## 2023-05-08 NOTE — PROGRESS NOTES
"When opening a documentation only encounter, be sure to enter in \"Chief Complaint\" Forms and in \" Comments\" Title of form, description if needed.    Clarke is a 72 year old  male  Form received via: Fax  Form now resides in: Provider Ready    Monica Chang                  "

## 2023-05-11 ENCOUNTER — VIRTUAL VISIT (OUTPATIENT)
Dept: PSYCHOLOGY | Facility: CLINIC | Age: 73
End: 2023-05-11
Payer: COMMERCIAL

## 2023-05-11 DIAGNOSIS — F41.1 GAD (GENERALIZED ANXIETY DISORDER): ICD-10-CM

## 2023-05-11 DIAGNOSIS — F34.1 PERSISTENT DEPRESSIVE DISORDER: Primary | ICD-10-CM

## 2023-05-11 DIAGNOSIS — F33.1 MODERATE EPISODE OF RECURRENT MAJOR DEPRESSIVE DISORDER (H): ICD-10-CM

## 2023-05-11 PROCEDURE — 90834 PSYTX W PT 45 MINUTES: CPT | Mod: 95 | Performed by: PSYCHOLOGIST

## 2023-05-11 NOTE — PROGRESS NOTES
Telemedicine Visit: The patient's condition can be safely assessed and treated via audio encounter.      Reason for Telemedicine Visit: Patient has requested telehealth visit and Patient unable to travel    Originating Site (Patient Location): Patient's home    Distant Location (provider location):  Off-site    Consent:  The patient/guardian has verbally consented to: the potential risks and benefits of telemedicine (video visit) versus in person care; bill my insurance or make self-payment for services provided; and responsibility for payment of non-covered services.     Mode of Communication:  Telephone call via doximity    As the provider I attest to compliance with applicable laws and regulations related to telemedicine.    Behavioral Health Progress Note    Client's Legal Name: Clarke Moreira    Client's Preferred Name: Clarke  YOB: 1950  Type of Service: telephone  Length of Service:   Start time: 8:20    End time: 9:04  Duration: 44 minutes  Attendees: patient    Identifying Information and Presenting Problem:  Clarke is a 72 year old male who is being seen for problematic symptoms of depression, low self-worth, and negative view of himself resulting from childhood trauma and other subsequent life circumstances.    Treatment Objective(s) Addressed in This Session:  Goal 1: Clarke will learn/use coping skills to continue to reduce intensity/frequency of suicidal thoughts     Goal 2: Clarke will challenge/reframe negative harsh thoughts that he has about himself.     Progress on / Status of Treatment Objective(s) / Homework:  Minimal progress     Mental Health Screening Questionnaires:  PHQ-9:     1/6/2020    12:19 PM 11/9/2020    11:07 AM 12/13/2022     9:46 AM   PHQ   PHQ-9 Total Score 21 22 18   Q9: Thoughts of better off dead/self-harm past 2 weeks Several days More than half the days Several days   F/U: Thoughts of suicide or self-harm No     F/U: Safety concerns No        CHRIS-7:  "      12/11/2018    10:31 AM 5/10/2019     8:26 AM 9/3/2019    10:35 AM   CHRIS-7 SCORE   Total Score 20 13 19     Topics Discussed/Interventions Provided:  Todays session focused on mom's description of Clarke as \"slow\"  Clarke identifies that he had internalized this description of himself to mean he was stupid and worthless. Had an experience this week where he was \"slow\" in catching a joke someone said and realized that that description could mean a number of different things.  This piece of insight was very helpful in Clarke recognizing better that the things he thinks about himself are not necessarily truths, but more parts of different perceptions.  Discussed how identifying these thoughts and the assumptions that come with these thoughts can help him start to reconstruct a more wholistic narrative about himself.  Clarke has not been reporting as frequent or intense suicidal thoughts - they are still present, but are not consuming his thinking as much as they once were.       Assessment:   The patient appeared to be active and engaged in today's session and was receptive to feedback.     Mental Status Exam:  During the visit today, Clarke was cooperative, open, engaged and pleasant. Patient was generally alert and oriented to person, place, time, and situation. Phone visit so unable to comment on dress, grooming, eye contact, or psychomotor functioning.  Speech was normal limits tone, rate, volume; largely coherent and relevant to topic. Mood was \"the same\"; affect appropriate and mood-congruent. Thought processes: logical and coherent. Thought content: no evidence of psychotic features and easily dismissed passive thoughts of death with no other self-directed violence related thinking Intent, urges, planning, behavior/recent attempts. Memory appeared grossly intact without being formally evaluated. Insight: good. Judgment good. Patient exhibited good impulse control during the appointment.     Does the " patient appear to be at imminent risk of harm to self/others at this time? No    The session was necessary to address negative self-thoughts and chronic suicidal thoughts that contribute to ongoing depressive symptoms and interfere with his ability to function in areas related to interacting and relating with others, social relationships, maintaining personal health and physical well-being, day to day self care or health behaviors, participating in meaningful activities and instrumental activities of daily living (IADLs). Ongoing psychotherapy is necessary to stabilize mood, provide counseling, improve functioning with daily activities and provide support.    DSM-5-TR Diagnosis:  Persistent Depressive Disorder  Major Depression, recurrent, moderate  Generalized Anxiety Disorder    Plan:  1. Follow up with this provider 6/1      Lety Buenrostro PsyD    NOTE: Treatment plan update due 7/25/23.  Diagnostic assessment update due 1/25/24.

## 2023-05-19 ENCOUNTER — DOCUMENTATION ONLY (OUTPATIENT)
Dept: FAMILY MEDICINE | Facility: CLINIC | Age: 73
End: 2023-05-19
Payer: COMMERCIAL

## 2023-05-23 NOTE — PROGRESS NOTES
Form has been completed by provider.     Form sent out via: Fax to [facility] [employer] [place] at Fax Number: [phone]  Patient informed: N/A  Output date: May 23, 2023    Kim Giron MA      **Please close the encounter**

## 2023-06-08 ENCOUNTER — DOCUMENTATION ONLY (OUTPATIENT)
Dept: ANTICOAGULATION | Facility: CLINIC | Age: 73
End: 2023-06-08
Payer: COMMERCIAL

## 2023-06-08 NOTE — PROGRESS NOTES
ANTICOAGULATION MANAGEMENT PROGRAM    Dr. Shepard,     Our records indicate that Clarke Moreira remains past due to check INR. Clarke Moreira was contacted multiple times over at least the last 8 weeks to attempt to arrange a follow up appointment.    Clarke Moreira last had an INR checked on 12/13/2022 and was due for follow up on 1/24/2023.     Called patient,Spoke with Clarke who declined to schedule a lab appointment at this time. If calling back please schedule as soon as possible.    - reported he goes and discusses this with his  psychoanalyst. He is sorry to be so difficult at this time.  He understands he needs INR check.  Has cancelled multiple appts for doctors visits / labs / INRs caused by significant anxiety and distress.     At this time Clarke Moreira will be moved to noncompliant status within the program until their referral expires on 3/7/2024. While in noncompliant status the patient would continue to be contacted every 6 weeks by the anticoagulation program to attempt to schedule patient. You will be notified of each contact attempt to make you aware of patient's ongoing noncompliance.        Thank you,     [facility] Anticoagulation Management Program

## 2023-06-13 NOTE — NURSING NOTE
Chief Complaints and History of Present Illnesses   Patient presents with     Diabetic Eye Exam     HPI    Affected eye(s):  Both   Symptoms:     Dryness   Itching      Frequency:  Constant       Do you have eye pain now?:  No      Comments:  Has not had eye exam since 2011, glasses are from before. Has since developed diabetes and heart issues. Dryness, itching and irritation, tearing (this AM). Uses Saline PRN OU    Had several episodes that they thought were mini strokes (but were not) in May causing vision changes, but vision has returned to normal.     (checked twice a week)    Lab Results       Component                Value               Date                       A1C                      6.3                 03/27/2018                 A1C                      6.3                 11/27/2017                 A1C                      6.5                 08/14/2017                 A1C                      6.4                 09/14/2016                 A1C                      6.5                 08/01/2014            Mayuri Lezama COT 10:53 AM June 12, 2018                 Tazorac Pregnancy And Lactation Text: This medication is not safe during pregnancy. It is unknown if this medication is excreted in breast milk.

## 2023-06-20 DIAGNOSIS — I48.0 PAROXYSMAL ATRIAL FIBRILLATION (H): ICD-10-CM

## 2023-06-20 DIAGNOSIS — E03.9 HYPOTHYROIDISM, UNSPECIFIED TYPE: ICD-10-CM

## 2023-06-20 DIAGNOSIS — I50.30 HEART FAILURE WITH PRESERVED EJECTION FRACTION, UNSPECIFIED HF CHRONICITY (H): ICD-10-CM

## 2023-06-20 DIAGNOSIS — R33.9 URINARY RETENTION: ICD-10-CM

## 2023-06-20 DIAGNOSIS — E78.5 HYPERLIPIDEMIA LDL GOAL <100: ICD-10-CM

## 2023-06-20 DIAGNOSIS — E11.9 TYPE 2 DIABETES MELLITUS WITHOUT COMPLICATION, WITHOUT LONG-TERM CURRENT USE OF INSULIN (H): ICD-10-CM

## 2023-06-20 DIAGNOSIS — I50.32 CHRONIC HEART FAILURE WITH PRESERVED EJECTION FRACTION (H): ICD-10-CM

## 2023-06-20 RX ORDER — METOPROLOL SUCCINATE 100 MG/1
100 TABLET, EXTENDED RELEASE ORAL DAILY
Qty: 30 TABLET | Refills: 0 | Status: SHIPPED | OUTPATIENT
Start: 2023-06-20 | End: 2023-07-26

## 2023-06-20 RX ORDER — CHLORAL HYDRATE 500 MG
CAPSULE ORAL
Qty: 90 CAPSULE | Refills: 0 | Status: SHIPPED | OUTPATIENT
Start: 2023-06-20 | End: 2023-09-27

## 2023-06-20 RX ORDER — TORSEMIDE 100 MG/1
100 TABLET ORAL DAILY
Qty: 30 TABLET | Refills: 0 | Status: SHIPPED | OUTPATIENT
Start: 2023-06-20 | End: 2023-07-26

## 2023-06-20 RX ORDER — POTASSIUM CHLORIDE 1500 MG/1
TABLET, EXTENDED RELEASE ORAL
Qty: 120 TABLET | Refills: 0 | Status: SHIPPED | OUTPATIENT
Start: 2023-06-20 | End: 2023-07-26

## 2023-06-20 RX ORDER — SPIRONOLACTONE 25 MG/1
50 TABLET ORAL DAILY
Qty: 180 TABLET | Refills: 0 | Status: SHIPPED | OUTPATIENT
Start: 2023-06-20 | End: 2023-09-13

## 2023-06-20 RX ORDER — TAMSULOSIN HYDROCHLORIDE 0.4 MG/1
0.4 CAPSULE ORAL DAILY
Qty: 30 CAPSULE | Refills: 0 | Status: SHIPPED | OUTPATIENT
Start: 2023-06-20 | End: 2023-08-08

## 2023-06-20 RX ORDER — LEVOTHYROXINE SODIUM 175 UG/1
175 TABLET ORAL
Qty: 90 TABLET | Refills: 0 | Status: SHIPPED | OUTPATIENT
Start: 2023-06-20 | End: 2023-09-13

## 2023-06-20 RX ORDER — FOLIC ACID/MV,IRON,MIN/LUTEIN 0.4-18-25
TABLET ORAL
Qty: 100 TABLET | Refills: 0 | Status: SHIPPED | OUTPATIENT
Start: 2023-06-20 | End: 2024-07-01

## 2023-06-20 NOTE — TELEPHONE ENCOUNTER
"Last visit 12/13/22    Request for medication refill:  levothyroxine (SYNTHROID/LEVOTHROID) 175 MCG tablet  multivitamin w/minerals (MULTI-VITAMIN) tablet  omega 3 1000 MG CAPS  spironolactone (ALDACTONE) 25 MG tablet  Providers if patient needs an appointment and you are willing to give a one month supply please refill for one month and  send a letter/MyChart using \".SMILLIMITEDREFILL\" .smillimited and route chart to \"[facility] \" (Giving one month refill in non controlled medications is strongly recommended before denial)    If refill has been denied, meaning absolutely no refills without visit, please complete the smart phrase \".smirxrefuse\" and route it to the \"[facility] MED REFILLS\"  pool to inform the patient and the pharmacy.    Margi Noel MA        "

## 2023-06-20 NOTE — TELEPHONE ENCOUNTER
"Request for medication refill:  metoprolol succinate ER (TOPROL XL) 100 MG 24 hr tablet  potassium chloride ER (KLOR-CON) 20 MEQ CR tablet  tamsulosin (FLOMAX) 0.4 MG capsule  torsemide (DEMADEX) 100 MG tablet  Providers if patient needs an appointment and you are willing to give a one month supply please refill for one month and  send a letter/MyChart using \".SMILLIMITEDREFILL\" .smillimited and route chart to \"[facility] \" (Giving one month refill in non controlled medications is strongly recommended before denial)    If refill has been denied, meaning absolutely no refills without visit, please complete the smart phrase \".smirxrefuse\" and route it to the \"[facility] MED REFILLS\"  pool to inform the patient and the pharmacy.    Margi Noel MA        "

## 2023-06-26 NOTE — TELEPHONE ENCOUNTER
[facility] phone call message - order or referral request for patient:     Order or referral being requested: Home PT and        Additional Comments: Nadeen from  home care requesting a Verbal order for pt, Home PT 2 times a week for 4 weeks, and  for evaluation.     OK to leave a message on voice mail? Yes    Primary language: English      needed? No    Call taken on October 30, 2017 at 2:30 PM by Sandhya Cha      
Returned call to home care PT, unable to reach. Left VM with verbal orders per protocol as requested. Left callback number for questions.    Steffany Bo RN      
yes

## 2023-07-03 ENCOUNTER — OFFICE VISIT (OUTPATIENT)
Dept: FAMILY MEDICINE | Facility: CLINIC | Age: 73
End: 2023-07-03
Payer: COMMERCIAL

## 2023-07-03 VITALS
HEART RATE: 61 BPM | TEMPERATURE: 97.9 F | DIASTOLIC BLOOD PRESSURE: 70 MMHG | BODY MASS INDEX: 51.47 KG/M2 | WEIGHT: 315 LBS | OXYGEN SATURATION: 96 % | SYSTOLIC BLOOD PRESSURE: 111 MMHG | RESPIRATION RATE: 16 BRPM

## 2023-07-03 DIAGNOSIS — I48.19 PERSISTENT ATRIAL FIBRILLATION (H): ICD-10-CM

## 2023-07-03 DIAGNOSIS — I50.22 CHRONIC SYSTOLIC CONGESTIVE HEART FAILURE (H): ICD-10-CM

## 2023-07-03 DIAGNOSIS — E11.65 TYPE 2 DIABETES MELLITUS WITH HYPERGLYCEMIA, WITHOUT LONG-TERM CURRENT USE OF INSULIN (H): ICD-10-CM

## 2023-07-03 DIAGNOSIS — L82.0 INFLAMED SEBORRHEIC KERATOSIS: Primary | ICD-10-CM

## 2023-07-03 LAB
ALBUMIN SERPL BCG-MCNC: 4.2 G/DL (ref 3.5–5.2)
ALP SERPL-CCNC: 108 U/L (ref 40–129)
ALT SERPL W P-5'-P-CCNC: 9 U/L (ref 0–70)
ANION GAP SERPL CALCULATED.3IONS-SCNC: 15 MMOL/L (ref 7–15)
AST SERPL W P-5'-P-CCNC: 28 U/L (ref 0–45)
BILIRUB SERPL-MCNC: 0.6 MG/DL
BUN SERPL-MCNC: 15.8 MG/DL (ref 8–23)
CALCIUM SERPL-MCNC: 10.2 MG/DL (ref 8.8–10.2)
CHLORIDE SERPL-SCNC: 98 MMOL/L (ref 98–107)
CREAT SERPL-MCNC: 1.27 MG/DL (ref 0.67–1.17)
DEPRECATED HCO3 PLAS-SCNC: 26 MMOL/L (ref 22–29)
ERYTHROCYTE [DISTWIDTH] IN BLOOD BY AUTOMATED COUNT: 13.5 % (ref 10–15)
GFR SERPL CREATININE-BSD FRML MDRD: 60 ML/MIN/1.73M2
GLUCOSE SERPL-MCNC: 103 MG/DL (ref 70–99)
HBA1C MFR BLD: 6.5 % (ref 0–5.6)
HCT VFR BLD AUTO: 51.1 % (ref 40–53)
HGB BLD-MCNC: 16.1 G/DL (ref 13.3–17.7)
INR PPP: 2.17 (ref 0.85–1.15)
MCH RBC QN AUTO: 27.8 PG (ref 26.5–33)
MCHC RBC AUTO-ENTMCNC: 31.5 G/DL (ref 31.5–36.5)
MCV RBC AUTO: 88 FL (ref 78–100)
NT-PROBNP SERPL-MCNC: 543 PG/ML (ref 0–900)
PLATELET # BLD AUTO: 242 10E3/UL (ref 150–450)
POTASSIUM SERPL-SCNC: 4.7 MMOL/L (ref 3.4–5.3)
PROT SERPL-MCNC: 8.1 G/DL (ref 6.4–8.3)
RBC # BLD AUTO: 5.79 10E6/UL (ref 4.4–5.9)
SODIUM SERPL-SCNC: 139 MMOL/L (ref 136–145)
WBC # BLD AUTO: 10 10E3/UL (ref 4–11)

## 2023-07-03 PROCEDURE — 80053 COMPREHEN METABOLIC PANEL: CPT | Performed by: FAMILY MEDICINE

## 2023-07-03 PROCEDURE — 83036 HEMOGLOBIN GLYCOSYLATED A1C: CPT | Performed by: FAMILY MEDICINE

## 2023-07-03 PROCEDURE — 99214 OFFICE O/P EST MOD 30 MIN: CPT | Performed by: FAMILY MEDICINE

## 2023-07-03 PROCEDURE — 83880 ASSAY OF NATRIURETIC PEPTIDE: CPT | Performed by: FAMILY MEDICINE

## 2023-07-03 PROCEDURE — 85027 COMPLETE CBC AUTOMATED: CPT | Performed by: FAMILY MEDICINE

## 2023-07-03 PROCEDURE — 36415 COLL VENOUS BLD VENIPUNCTURE: CPT | Performed by: FAMILY MEDICINE

## 2023-07-03 PROCEDURE — 85610 PROTHROMBIN TIME: CPT | Performed by: FAMILY MEDICINE

## 2023-07-03 ASSESSMENT — PATIENT HEALTH QUESTIONNAIRE - PHQ9: SUM OF ALL RESPONSES TO PHQ QUESTIONS 1-9: 11

## 2023-07-03 NOTE — PROGRESS NOTES
"  Assessment & Plan     Inflamed seborrheic keratosis  Reassured that this was not Zoster and that he could get his vaccine.  Nghia K that came off.  Covered with Tegaderm and will remove in 3 days.      Persistent atrial fibrillation (H)  - INR; Future  - INR    Type 2 diabetes mellitus with hyperglycemia, without long-term current use of insulin (H)  Due and completed are incentive to get this done  - Hemoglobin A1c; Future  - Hemoglobin A1c    Chronic systolic congestive heart failure (H)  Will be reviewing with his primary at follow up visit/   - CBC with platelets  - Comprehensive metabolic panel  - N terminal pro BNP outpatient             BMI:   Estimated body mass index is 51.47 kg/m  as calculated from the following:    Height as of 12/13/22: 1.803 m (5' 11\").    Weight as of this encounter: 167.4 kg (369 lb).           No follow-ups on file.    Dina Longoria MD  [facility] ANGEL Mir is a 73 year old, presenting for the following health issues:  Shingles (Possible)        7/3/2023     8:36 AM   Additional Questions   Roomed by shruthi   Accompanied by self     HPI     Pretty sure that he has shingles.   Started 6- 7 days ago.  Has noticed this for a long time, and woke up that day with bloody sheet.  Stinging on the back. Noted wet watery drainage and told that goes with shingles.  Hurts every day and night and is sleeping on padding.  No other symptoms.    Planning on coming in for wellness exam.             Review of Systems         Objective    /70   Pulse 61   Temp 97.9  F (36.6  C)   Resp 16   Wt (!) 167.4 kg (369 lb)   SpO2 96%   BMI 51.47 kg/m    Body mass index is 51.47 kg/m .  Physical Exam                             "

## 2023-07-03 NOTE — PATIENT INSTRUCTIONS
Patient Education   Here is the plan from today's visit    1. Persistent atrial fibrillation (H)  Doing the INR today   - INR; Future    2. Type 2 diabetes mellitus with hyperglycemia, without long-term current use of insulin (H)  Checking and will discuss at your next visit  - Hemoglobin A1c; Future      Please call or return to clinic if your symptoms don't go away.    Follow up plan  No follow-ups on file.    Thank you for coming to [facility] today.  Lab Testing:  **If you had lab testing today and your results are reassuring or normal they will be mailed to you or sent through Peach within 7 days.   **If the lab tests need quick action we will call you with the results.  **If you are having labs done on a different day, please call [phone] to schedule at [facility] or [phone] for other [facility] Outpatient Lab locations. Labs do not offer walk-in appointments.  The phone number we will call with results is # [phone] (home) . If this is not the best number please call our clinic and change the number.  Medication Refills:  If you need any refills please call your pharmacy and they will contact us.   If you need to  your refill at a new pharmacy, please contact the new pharmacy directly. The new pharmacy will help you get your medications transferred faster.   Scheduling:  If you have any concerns about today's visit or wish to schedule another appointment please call our office during normal business hours [phone] (8-5:00 M-F). If you can no longer make a scheduled visit, please cancel via Peach or call us to cancel.   If a referral was made to an [facility] specialty provider and you do not get a call from central scheduling, please refer to directions on your visit summary or call our office during normal business hours for assistance.   If a Mammogram was ordered for you at the Breast Center call [phone] to schedule or change your appointment.  If you  had an XRay/CT/Ultrasound/MRI ordered the number is [phone] to schedule or change your radiology appointment.   [facility] has limited ultrasound appointments available on Wednesdays, if you would like your ultrasound at [facility], please call [phone] to schedule.   Medical Concerns:  If you have urgent medical concerns please call [phone] at any time of the day.    Dina Longoria MD

## 2023-07-05 ENCOUNTER — ANTICOAGULATION THERAPY VISIT (OUTPATIENT)
Dept: ANTICOAGULATION | Facility: CLINIC | Age: 73
End: 2023-07-05
Payer: COMMERCIAL

## 2023-07-05 DIAGNOSIS — Z79.01 LONG TERM (CURRENT) USE OF ANTICOAGULANTS: ICD-10-CM

## 2023-07-05 DIAGNOSIS — I48.0 PAROXYSMAL ATRIAL FIBRILLATION (H): ICD-10-CM

## 2023-07-05 DIAGNOSIS — I48.20 CHRONIC ATRIAL FIBRILLATION (H): Primary | ICD-10-CM

## 2023-07-05 NOTE — PROGRESS NOTES
ANTICOAGULATION MANAGEMENT     Clarke Moreira 73 year old male is on warfarin with therapeutic INR result. (Goal INR 2.0-3.0)    Recent labs: (last 7 days)     07/03/23  0914   INR 2.17*       ASSESSMENT       Source(s): Chart Review and Patient/Caregiver Call       Warfarin doses taken: Warfarin taken as instructed    Diet: No new diet changes identified    Medication/supplement changes: None noted    New illness, injury, or hospitalization: No    Signs or symptoms of bleeding or clotting: No    Previous result: Therapeutic last 4 visits.    Additional findings: None         PLAN     Recommended plan for no diet, medication or health factor changes affecting INR     Dosing Instructions: Continue your current warfarin dose with next INR in 6 weeks       Summary  As of 7/5/2023    Full warfarin instructions:  5 mg every Thu; 10 mg all other days   Next INR check:  8/16/2023             Telephone call with  Clarke ([phone]) who verbalizes understanding and agrees to plan    Check at provider office visit - INR on 8/4/23 at Annual check with Dr. Shepard.    Education provided:     Taking warfarin: Importance of taking warfarin as instructed    Goal range and lab monitoring: goal range and significance of current result    Dietary considerations: importance of consistent vitamin K intake    Plan made per [facility] anticoagulation protocol    Mirna Mcknight RN  Anticoagulation Clinic  7/5/2023    _______________________________________________________________________     Anticoagulation Episode Summary     Current INR goal:  2.0-3.0   TTR:  100.0 % (1.7 mo)   Target end date:  Indefinite   Send INR reminders to:  [facility]    Indications    Chronic atrial fibrillation (H) [I48.20]  Long term (current) use of anticoagulants [Z79.01]  Paroxysmal atrial fibrillation (H) [I48.0]           Comments:  Home Care         Anticoagulation Care Providers     Provider Role Specialty Phone number    Matthias Sanchez MD  [facility] [phone]

## 2023-07-20 ENCOUNTER — VIRTUAL VISIT (OUTPATIENT)
Dept: PSYCHOLOGY | Facility: CLINIC | Age: 73
End: 2023-07-20
Payer: COMMERCIAL

## 2023-07-20 DIAGNOSIS — F34.1 PERSISTENT DEPRESSIVE DISORDER: Primary | ICD-10-CM

## 2023-07-20 DIAGNOSIS — F41.1 GAD (GENERALIZED ANXIETY DISORDER): ICD-10-CM

## 2023-07-20 DIAGNOSIS — F33.1 MODERATE EPISODE OF RECURRENT MAJOR DEPRESSIVE DISORDER (H): ICD-10-CM

## 2023-07-20 PROCEDURE — 90834 PSYTX W PT 45 MINUTES: CPT | Mod: VID | Performed by: PSYCHOLOGIST

## 2023-07-20 NOTE — PROGRESS NOTES
Telemedicine Visit: The patient's condition can be safely assessed and treated via audio encounter.      Reason for Telemedicine Visit: Patient has requested telehealth visit and Patient unable to travel    Originating Site (Patient Location): Patient's home    Distant Location (provider location):  On-site    Consent:  The patient/guardian has verbally consented to: the potential risks and benefits of telemedicine (video visit) versus in person care; bill my insurance or make self-payment for services provided; and responsibility for payment of non-covered services.     Mode of Communication:  Telephone call via doximity    As the provider I attest to compliance with applicable laws and regulations related to telemedicine.    Behavioral Health Progress Note    Client's Legal Name: Clarke Moreira    Client's Preferred Name: Clarke  YOB: 1950  Type of Service: telephone  Length of Service:   Start time: 8:24    End time: 9:05  Duration: 41 minutes  Attendees: patient    Identifying Information and Presenting Problem:  Clarke is a 73 year old male who is being seen for problematic symptoms of depression, low self-worth, and negative view of himself resulting from childhood trauma and other subsequent life circumstances.    Treatment Objective(s) Addressed in This Session:  Goal 1: Clarke will learn/use coping skills to continue to reduce intensity/frequency of suicidal thoughts     Goal 2: Clarke will challenge/reframe negative harsh thoughts that he has about himself.     Progress on / Status of Treatment Objective(s) / Homework:  Minimal progress     Mental Health Screening Questionnaires:  PHQ-9:       11/9/2020    11:07 AM 12/13/2022     9:46 AM 7/3/2023     8:38 AM   PHQ   PHQ-9 Total Score 22 18 11   Q9: Thoughts of better off dead/self-harm past 2 weeks More than half the days Several days Not at all      CHRIS-7:       12/11/2018    10:31 AM 5/10/2019     8:26 AM 9/3/2019    10:35 AM  "  CHRIS-7 SCORE   Total Score 20 13 19     Topics Discussed/Interventions Provided:  Clarke shared what led to him cancelling our last appointment.  He reports he has been having more thoughts about suicide.  States that he does not have a plan or intent to do something specifically to himself, but thoughts of wondering if this is all there is and frustration that his life has turned out this way.  More difficulty finding hope. He was feeling overwhelmed with these thoughts and then cancelled our appointment because he wasn't sure how to convey this to me.  Did not make any attempt and did not take any steps toward making an attempt.      Patient Risk Assessment:  Today Clarke reported previous thoughts about suicide as described above. He has notable risk factors for self-harm, including hopelessness, lives alone/ isolated and male. However, risk is mitigated by no h/o suicide attempt, no plan or intent, none to minimal alcohol use  and stable housing. Therefore, based on all available evidence including the factors cited above, he does not appear to be at imminent risk for self-harm, does not meet criteria for an emergency hold, and therefore involuntary hospitalization will not be pursued at this time.  Clarke has crisis numbers and was encouraged to call these local crisis numbers, 911, or visit a local emergency room if thoughts of suicide or homicide were to arise and/or if he were to be in acute distress. The patient agreed to utilize these services as indicated if the need were to arise.      Mental Status Exam:  During the visit today, Clarke was open, engaged and respectful. Patient was generally alert and oriented to all spheres. This was a phone visit so I am unable to comment on dress, grooming, eye contact, or psychomotor functioning.  Speech was his normal rate and rhythm and tone. He stated that his mood is \"hard to describe.\"  Affect was blunted. Thought processes were coherent.. Thought content: " no evidence of psychotic features and please see assessment above regarding thoughts about suicide.. Memory appeared grossly intact with no formal evaluation. Insight is good. Judgment is good. Patient exhibited good impulse control during the appointment.     Does the patient appear to be at imminent risk of harm to self/others at this time? No    The session was necessary to assess level of suicidality and level of current symptoms of depression. These symptoms interfere with his ability to function in areas related to interacting and relating with others, social relationships, maintaining personal health and physical well-being, day to day self care or health behaviors, participating in meaningful activities and instrumental activities of daily living (IADLs). Ongoing psychotherapy is necessary to stabilize mood, provide counseling, improve functioning with daily activities and provide support.    DSM-5-TR Diagnosis:  Persistent Depressive Disorder  Major Depression, recurrent, moderate  Generalized Anxiety Disorder    Plan:  1. Follow up with this provider 8/7, Clarke is aware that he can call before then if needed.       Lety Buenrostro PsyD    NOTE: Treatment plan update due 7/25/23.  Diagnostic assessment update due 1/25/24.

## 2023-07-21 NOTE — PROGRESS NOTES
SUBJECTIVE:   CC: Rylan is an 36 year old who presents for preventive health visit.       7/21/2023     1:07 PM   Additional Questions   Roomed by Leana QUILES     Healthy Habits:     Getting at least 3 servings of Calcium per day:  Yes    Bi-annual eye exam:  NO    Dental care twice a year:  NO    Sleep apnea or symptoms of sleep apnea:  None    Diet:  Regular (no restrictions)    Frequency of exercise:  1 day/week    Duration of exercise:  15-30 minutes    Taking medications regularly:  Yes    Medication side effects:  None    Additional concerns today:  No    Cycles used to be every 30-31 days but past year cycles are shorter every 26-28 days.  Menstrual flow for 7-8 days, sometimes if heavier flow than 6-7 days duration.    Trying to loose weight    One daughter age 12, three sons ages 20, 18, and 13    Today's PHQ-2 Score:       7/21/2023     1:03 PM   PHQ-2 ( 1999 [employer])   Q1: Little interest or pleasure in doing things 0   Q2: Feeling down, depressed or hopeless 0   PHQ-2 Score 0   Q1: Little interest or pleasure in doing things Not at all   Q2: Feeling down, depressed or hopeless Not at all   PHQ-2 Score 0       Have you ever done Advance Care Planning? (For example, a Health Directive, POLST, or a discussion with a medical provider or your loved ones about your wishes): No, will take copy home to [facility]   Social History     Tobacco Use     Smoking status: Never     Smokeless tobacco: Never   Substance Use Topics     Alcohol use: No             7/19/2023    10:18 AM   Alcohol Use   Prescreen: >3 drinks/day or >7 drinks/week? Not Applicable          No data to display              Reviewed orders with patient.  Reviewed health maintenance and updated orders accordingly - Yes  BP Readings from Last 3 Encounters:   07/21/23 100/60   10/15/19 109/75   02/27/19 113/74    Wt Readings from Last 3 Encounters:   07/21/23 69.4 kg (153 lb)   10/15/19 68.5 kg (151 lb)   02/27/19 61.2 kg (135 lb)                 "The following statement was read to the patient at the outset of this visit:     \"We have found that certain health care needs can be provided without the need for a face to face visit.  This service lets us provide the care you need with a phone conversation. I will have full access to your [facility] medical record during this entire phone call.   I will be taking notes for your medical record.  Since this is like an office visit, we will bill your insurance company for this service. There are potential benefits and risks of telephone visits (e.g. limits to patient confidentiality) that differ from in-person visits.?  Confidentiality still applies for telephone services, and nobody will record the visit.  It is important to be in a quiet, private space that is free of distractions (including cell phone or other devices) during the visit.??If during the course of the call I believe a telephone visit is not appropriate, you will not be charged for this service.\"     Consent has been obtained for this service by care team member: Yes     Emergency contact: Janice Hillman [phone]  [facility] Emergency Room: [place]  Location at time of call: home    Behavioral Health Progress Note    Client's Legal Name: Clarke Moreira    Client's Preferred Name: Clarke  YOB: 1950  Type of Service: telephone, counseling  Length of Service:   Start time: 9:02  End time: 9:44  Duration: 42 minutes  Attendees: patient    Identifying Information and Presenting Problem:    The patient is a 71 year old American male who I have seen off and on for therapy over the past couple of years.  Clarke did have difficulties at times coming into clinic for appointments as the anxiety would often lead him to cancel appointments.  Since the pandemic started and we have been doing phone visits, Clarke reports feeling much more comfortable in all of his appointments when they are done via telephone than when he is in person.  "   Patient Active Problem List   Diagnosis     CARDIOVASCULAR SCREENING; LDL GOAL LESS THAN 160     Past Surgical History:   Procedure Laterality Date     NO HISTORY OF SURGERY         Social History     Tobacco Use     Smoking status: Never     Smokeless tobacco: Never   Substance Use Topics     Alcohol use: No     Family History   Problem Relation Age of Onset     Ulcers Father      Diabetes No family hx of      Breast Cancer No family hx of          No current outpatient medications on file.     Allergies   Allergen Reactions     Nkda [No Known Drug Allergy]        Breast Cancer Screenin/19/2023    10:18 AM   Breast CA Risk Assessment (FHS-7)   Do you have a family history of breast, colon, or ovarian cancer? No / Unknown         Patient under 40 years of age: Routine Mammogram Screening not recommended.   Pertinent mammograms are reviewed under the imaging tab.    History of abnormal Pap smear: NO - age 30-65 PAP every 5 years with negative HPV co-testing recommended      3/11/2016    12:00 AM 2010    12:00 AM   PAP / HPV   PAP (Historical) NIL  NIL      Reviewed and updated as needed this visit by clinical staff   Tobacco  Allergies  Meds  Problems  Med Hx  Surg Hx  Fam Hx          Reviewed and updated as needed this visit by Provider     Meds  Problems  Med Hx  Surg Hx  Fam Hx             Review of Systems   Constitutional: Negative for chills and fever.   HENT: Negative for congestion, ear pain, hearing loss and sore throat.    Eyes: Negative for pain and visual disturbance.   Respiratory: Negative for cough and shortness of breath.    Cardiovascular: Negative for chest pain, palpitations and peripheral edema.   Gastrointestinal: Negative for abdominal pain, constipation, diarrhea, heartburn, hematochezia and nausea.   Breasts:  Negative for tenderness, breast mass and discharge.   Genitourinary: Negative for dysuria, frequency, genital sores, hematuria, pelvic pain, urgency,  "In person appointments with all providers have always caused significant distress due to his desire to be perceived in a particular way.  On the telephone, he feels as if he can better articulate his experience and what he is thinking as he feels less need to project a particular image to those that are caring for him.  Anxiety has kept Clarke from seeking medical care that he needs when he notices exacerbations of conditions.  We have been focused on increasing skills to cope with anxiety so that he can stay appropriately engaged in needed healthcare, as well as continuing to challenge very embedded negative core beliefs about himself.     Treatment Objective(s) Addressed in This Session:    Clarke will practice a healthy daily discipline of diet and exercise.  Clarke will learn to manage anxiety so he can make and keep appointments     Progress on / Status of Treatment Objective(s) / Homework:  Feeling a little less depressed    PHQ-9:   PHQ 11/9/2020 1/6/2020 9/3/2019   PHQ-9 Total Score 22 21 25   Q9: Thoughts of better off dead/self-harm past 2 weeks More than half the days Several days Nearly every day   F/U: Thoughts of suicide or self-harm - No -   F/U: Safety concerns - No -      CHRIS-7:   CHRIS-7 SCORE 9/3/2019 5/10/2019 12/11/2018   Total Score - - -   Total Score 19 13 20     Topics Discussed/Interventions Provided:  Clarke shares that he is doing a lot better than he \"lets himself believe.\"  Thanked this writer for our conversation at our last visit. Processed his reaction to that conversation.  Identified that the discussion allowed him to see the narrative he was telling himself about the situation in a much different way.  As he had this realization, he was able to sit with the thoughts/feelings he was having in reaction to that.  Instead of jumping to avoidance, he was able to be with these reactions and noticed a shift in himself about this.  Clarke identified several other situations that came " "up in the subsequent weeks where he felt he was able to approach the situations as \"an adult and an equal, rather than a boy.\"  Also noticed that relationally, when he was talking to an old friend on the phone, he was able to with this friend in his hurt and despair and this resulted in the friend asking him for help. Clarke was profoundly moved by this, as this had not happened in their relationship before.  He has typically been the one asking for help.  Clarke is feeling very grateful about the shifts he has been noticing for himself.    Assessment:   The patient appeared to be active and engaged in today's session and was receptive to feedback.     Mental Status:   During our visit today, Clarke was engaged, pleasant and respectful. He was alert and oriented to person, place, time, and situation.  Unable to assess appearance as this was a phone visit.  Speech was normal rate and rhythm.  Mood was more upbeat and positive, appropriate to stated mood.  Thought processes were logical and coherent. Thought content described today, did NOT contain the usual self-deprecating thoughts he often shares.  Has thoughts that he wishes he wouldn't wake up or be here at times, but denies any intent or plan to harm himself.  Memory appeared grossly intact without being formally evaluated. Insight and judgment are good.  Patient exhibited good impulse control during the appointment.     Does the patient appear to be at imminent risk of harm to self/others at this time? No    The session was necessary to challenge identify, process, and reinforce positive shifts in narrative and perspective that have led to increased self-efficacy.  Ongoing depressive symptoms and low self-view continue to interfere with patient's ability to function in areas related to social relationships day to day self care or health behaviors chronic disease management household management. Ongoing psychotherapy is necessary to stabilize mood, provide " "vaginal bleeding and vaginal discharge.   Musculoskeletal: Negative for arthralgias, joint swelling and myalgias.   Skin: Negative for rash.   Neurological: Negative for dizziness, weakness, headaches and paresthesias.   Psychiatric/Behavioral: Negative for mood changes. The patient is not nervous/anxious.           OBJECTIVE:   /60 (BP Location: Right arm, Patient Position: Sitting, Cuff Size: Adult Regular)   Pulse 77   Temp 98  F (36.7  C) (Oral)   Resp 16   Ht 1.486 m (4' 10.5\")   Wt 69.4 kg (153 lb)   LMP 07/06/2023   SpO2 98%   BMI 31.43 kg/m    Physical Exam  GENERAL: healthy, alert and no distress  EYES: Eyes grossly normal to inspection, PERRL and conjunctivae and sclerae normal  HENT: ear canals and TM's normal, nose and mouth without ulcers or lesions  NECK: no adenopathy, no asymmetry, masses, or scars and thyroid normal to palpation  RESP: lungs clear to auscultation - no rales, rhonchi or wheezes  BREAST: normal without masses, tenderness or nipple discharge and no palpable axillary masses or adenopathy  CV: regular rate and rhythm, normal S1 S2, no S3 or S4, no murmur, click or rub, no peripheral edema and peripheral pulses strong  ABDOMEN: soft, nontender, no hepatosplenomegaly, no masses and bowel sounds normal   (female): normal female external genitalia, normal urethral meatus, vaginal mucosa pink, moist, well rugated, and normal cervix/adnexa/uterus without masses or discharge  MS: no gross musculoskeletal defects noted, no edema  SKIN: no suspicious lesions or rashes  NEURO: Normal strength and tone, mentation intact and speech normal  PSYCH: mentation appears normal, affect normal/bright    Diagnostic Test Results:  Labs reviewed in Epic  Results for orders placed or performed in visit on 07/21/23 (from the past 24 hour(s))   Hemoglobin A1c   Result Value Ref Range    Hemoglobin A1C 6.5 (H) 0.0 - 5.6 %       ASSESSMENT/PLAN:   Rylan was seen today for physical and " counseling, improve functioning with daily activities and provide support.    DSM-5 Diagnosis:  Major Depression, recurrent, moderate  Generalized Anxiety Disorder  R/o persistent depressive disorder    Plan:  - f/up on 6/3  - update diagnostic assessment and treatment plan      Lety Buenrostro PsyD    NOTE: Treatment plan update needed on 3/10/21.  Diagnostic assessment update due 10/15/19.   imm/inj.    Diagnoses and all orders for this visit:    Routine history and physical examination of adult  -     REVIEW OF HEALTH MAINTENANCE PROTOCOL ORDERS  -     Hepatitis B Surface Antibody; Future  -     Chlamydia trachomatis/Neisseria gonorrhoeae by PCR - Clinic Collect  -     Hepatitis B Surface Antibody    Elevated hemoglobin A1c  -     Hemoglobin A1c; Future  -     Hemoglobin A1c; Future    Screening for diabetes mellitus  -     Hemoglobin A1c; Future  -     Hemoglobin A1c  -     Hemoglobin A1c; Future    Need for hepatitis C screening test  -     Hepatitis C Screen Reflex to HCV RNA Quant and Genotype; Future  -     Hepatitis C Screen Reflex to HCV RNA Quant and Genotype    Cervical cancer screening  -     Pap Screen with HPV - recommended age 30 - 65 years    Screening for hyperlipidemia  -     Lipid panel reflex to direct LDL Non-fasting; Future  -     Lipid panel reflex to direct LDL Non-fasting    High priority for 2019-nCoV vaccine  -     COVID-19 BIVALENT 12+ (PFIZER)      Will contact patient with results.  Her Hemoglobin A1C came back 6.5 so will recommend repeating to verify if new diagnosis of diabetes.    COUNSELING:  Reviewed preventive health counseling, as reflected in patient instructions       Regular exercise       Healthy diet/nutrition       Immunizations    Vaccinated for: Covid-19              She reports that she has never smoked. She has never used smokeless tobacco.          Jessica Armstrong NP  [facility]

## 2023-07-26 DIAGNOSIS — I48.0 PAROXYSMAL ATRIAL FIBRILLATION (H): ICD-10-CM

## 2023-07-26 DIAGNOSIS — I50.32 CHRONIC HEART FAILURE WITH PRESERVED EJECTION FRACTION (H): ICD-10-CM

## 2023-07-26 RX ORDER — TORSEMIDE 100 MG/1
100 TABLET ORAL DAILY
Qty: 30 TABLET | Refills: 0 | Status: SHIPPED | OUTPATIENT
Start: 2023-07-26 | End: 2023-08-24

## 2023-07-26 RX ORDER — POTASSIUM CHLORIDE 1500 MG/1
TABLET, EXTENDED RELEASE ORAL
Qty: 120 TABLET | Refills: 0 | Status: SHIPPED | OUTPATIENT
Start: 2023-07-26 | End: 2023-08-24

## 2023-07-26 RX ORDER — METOPROLOL SUCCINATE 100 MG/1
100 TABLET, EXTENDED RELEASE ORAL DAILY
Qty: 30 TABLET | Refills: 0 | Status: SHIPPED | OUTPATIENT
Start: 2023-07-26 | End: 2023-08-24

## 2023-07-26 NOTE — TELEPHONE ENCOUNTER
"Last seen 7/3/23    Request for medication refill:    Providers if patient needs an appointment and you are willing to give a one month supply please refill for one month and  send a letter/MyChart using \".SMILLIMITEDREFILL\" .smillimited and route chart to \"P SMI \" (Giving one month refill in non controlled medications is strongly recommended before denial)    If refill has been denied, meaning absolutely no refills without visit, please complete the smart phrase \".smirxrefuse\" and route it to the \"P SMI MED REFILLS\"  pool to inform the patient and the pharmacy.    Teodora Webber RN      "

## 2023-08-03 ENCOUNTER — PRE VISIT (OUTPATIENT)
Dept: CARDIOLOGY | Facility: CLINIC | Age: 73
End: 2023-08-03
Payer: COMMERCIAL

## 2023-08-03 DIAGNOSIS — I50.30 (HFPEF) HEART FAILURE WITH PRESERVED EJECTION FRACTION (H): Primary | ICD-10-CM

## 2023-08-07 ENCOUNTER — VIRTUAL VISIT (OUTPATIENT)
Dept: PSYCHOLOGY | Facility: CLINIC | Age: 73
End: 2023-08-07
Payer: COMMERCIAL

## 2023-08-07 DIAGNOSIS — F41.1 GAD (GENERALIZED ANXIETY DISORDER): ICD-10-CM

## 2023-08-07 DIAGNOSIS — F34.1 PERSISTENT DEPRESSIVE DISORDER: Primary | ICD-10-CM

## 2023-08-07 PROCEDURE — 90834 PSYTX W PT 45 MINUTES: CPT | Mod: 95 | Performed by: PSYCHOLOGIST

## 2023-08-07 NOTE — PROGRESS NOTES
Telemedicine Visit: The patient's condition can be safely assessed and treated via audio encounter.      Reason for Telemedicine Visit: Patient has requested telehealth visit and Patient unable to travel    Originating Site (Patient Location): Patient's home    Distant Location (provider location):  On-site    Consent:  The patient/guardian has verbally consented to: the potential risks and benefits of telemedicine (video visit) versus in person care; bill my insurance or make self-payment for services provided; and responsibility for payment of non-covered services.     Mode of Communication:  Telephone call via doximity    As the provider I attest to compliance with applicable laws and regulations related to telemedicine.    Behavioral Health Progress Note    Client's Legal Name: Clarke Moreira    Client's Preferred Name: Clarke  YOB: 1950  Type of Service: telephone  Length of Service:   Start time: 9:08  End time: 9:59  Duration: 51 minutes  Attendees: patient    Identifying Information and Presenting Problem:  Clarke is a 73 year old male who is being seen for problematic symptoms of depression, low self-worth, and negative view of himself resulting from childhood trauma and other subsequent life circumstances.    Treatment Objective(s) Addressed in This Session:  Goal 1: Clarke will learn/use coping skills to continue to reduce intensity/frequency of suicidal thoughts     Goal 2: Clarke will challenge/reframe negative harsh thoughts that he has about himself.     Progress on / Status of Treatment Objective(s) / Homework:  Minimal progress     Mental Health Screening Questionnaires:  PHQ-9:       11/9/2020    11:07 AM 12/13/2022     9:46 AM 7/3/2023     8:38 AM   PHQ   PHQ-9 Total Score 22 18 11   Q9: Thoughts of better off dead/self-harm past 2 weeks More than half the days Several days Not at all      CHRIS-7:       12/11/2018    10:31 AM 5/10/2019     8:26 AM 9/3/2019    10:35 AM   CHRIS-7  "SCORE   Total Score 20 13 19     Topics Discussed/Interventions Provided:  \"I've been thinking about missed chances and lost opportunities, feeling disappointment..\"  Reports ongoing suicidal ideation, but denies any plan or intent to harm himself.  Continues to struggle with going to appointments.  Cancelled his appointment last week with Dr. Shepard.  Was prepared to go, but then started to get anxious about he might feel after the appointment.  He feels good while he is at clinic and connecting with the doctor, but then feels increased dysphoria and negative thoughts about himself when he returns home. He often cancels appointments to try to avoid that \"let down\" period afterwards.  He will feel more hopeful when interacting and it is very hard to come down from that hopeful feeling.  So, he avoids the situation to try not to experience the \"roller coaster\" of emotions.  Used motivational interviewing to discuss this cycle with Clarke, as well as to explore what next steps he would like to do. He states he would like to call and reschedule the appointment.  Second topic was in regard to decreasing some of the piles he has around his house.  Has bags of recycling all over, feels overwhelmed by all the stuff and this overwhelm paralyzes him to action.  Processed emotional reactions around this, as well as shared information on SMART goal principals to help him break down the task into manageable components.     Mental Status Exam:  During his visit, Clarke was open, easy to engage with, and pleasant .  It did take him a little more time today to share what he wanted to share about attending appointments as he reports feeling ashamed about cancelling. He was alert and oriented to person, place, time, and situation. Since this is a phone visit, I'm not able to comment on dress, grooming, eye contact, or psychomotor functioning.  Speech was his usual rate, rhythm and tone. He stated that his mood is \"blehh.\"  Affect " was subdued. Thought processes were logical and coherent. Thought content:He showed no evidence of psychotic features and easily dismissed passive thoughts of death with no other self-directed violence related thinking Intent, urges, planning, behavior/recent attempts. Memory appeared grossly intact with no formal evaluation. Insight is good. Judgment is good. Patient exhibited good impulse control during the appointment.     Does the patient appear to be at imminent risk of harm to self/others at this time? No    The session was necessary to identify chronic vicious cycles that make it difficult for Clarke to takes steps forward in his life as he gets very caught by unhelpful and ruminative negative thinking.  This leads to ongoing anxiety and depression symptoms that interfere with his ability to function in areas related to interacting and relating with others, social relationships, maintaining personal health and physical well-being, day to day self care or health behaviors, participating in meaningful activities and instrumental activities of daily living (IADLs). Ongoing psychotherapy is necessary to stabilize mood, provide counseling, improve functioning with daily activities and provide support.    DSM-5-TR Diagnosis:  Persistent Depressive Disorder  Major Depression, recurrent, moderate  Generalized Anxiety Disorder    Plan:  - Follow up with this provider 9/7 at 9am  - Update treatment plan      Lety Buenrostro PsyD    NOTE: Treatment plan update due 7/25/23.  Diagnostic assessment update due 1/25/24.

## 2023-08-07 NOTE — TELEPHONE ENCOUNTER
"RN spoke to patient to discuss concerns. Patient states he recently received a letter from [facility] that said they were denying coverage for certain services rendred at 6/21/21 visit with Dr. Sanchez. Patient states he knows this was his \"Wellness Exam\" but in the past they have always covered the additional issues that he and Dr. Sanchez address at these visits.     RN inquired if patient had reached out to billing department- he states he has but \"they don't know anything either\". Patient states he has reached out to Patient Relations in the past and is willing to do this again but the letter told him to contact his doctor first. Patient states he has also worked with SW in the past over insurance issues. RN advised that I am not familiar with insurance or billing issues but  would send encounter to clinic SW to see if she can help with the claim. Patient also asked that Dr. Sanchez be included as well. Advised that it may take a few days for him to hear back from SW. He verbalized understanding.   Elena Lora RN    " Radiologists confirmed results off abdomen x-ray which remain the same as the immediate care physicians results and patient was notified of these results at the time of visit.

## 2023-08-19 ENCOUNTER — HEALTH MAINTENANCE LETTER (OUTPATIENT)
Age: 73
End: 2023-08-19

## 2023-08-21 ENCOUNTER — TELEPHONE (OUTPATIENT)
Dept: FAMILY MEDICINE | Facility: CLINIC | Age: 73
End: 2023-08-21
Payer: COMMERCIAL

## 2023-08-21 NOTE — TELEPHONE ENCOUNTER
Warm transfer from . Pt just got prescription for tamsulosin (FLOMAX) 0.4 MG capsule. Pt has not had a dose since 8/13 and pt stated that it says on prescription info that if you have missed more than 2 doses to contact your provider. Pt is wondering if it is ok to start taking again. RN spoke with pharm D who stated it is ok for pt to start taking again. RN called pt back again and let pt know it is ok for pt to begin taking today. Pt verbalized understanding.

## 2023-08-24 ENCOUNTER — TELEPHONE (OUTPATIENT)
Dept: FAMILY MEDICINE | Facility: CLINIC | Age: 73
End: 2023-08-24
Payer: COMMERCIAL

## 2023-08-24 DIAGNOSIS — I50.32 CHRONIC HEART FAILURE WITH PRESERVED EJECTION FRACTION (H): ICD-10-CM

## 2023-08-24 DIAGNOSIS — I48.0 PAROXYSMAL ATRIAL FIBRILLATION (H): ICD-10-CM

## 2023-08-24 NOTE — TELEPHONE ENCOUNTER
"Last seen 7/3/23      Request for medication refill:  KLOR-CON 20 MEQ CR tablet   metoprolol succinate ER (TOPROL XL) 100 MG 24 hr tablet   torsemide (DEMADEX) 100 MG tablet     Providers if patient needs an appointment and you are willing to give a one month supply please refill for one month and  send a letter/MyChart using \".SMILLIMITEDREFILL\" .smillimited and route chart to \"P [facility] \" (Giving one month refill in non controlled medications is strongly recommended before denial)    If refill has been denied, meaning absolutely no refills without visit, please complete the smart phrase \".smirxrefuse\" and route it to the \"P SMI MED REFILLS\"  pool to inform the patient and the pharmacy.    Teodora Webber RN      "

## 2023-08-24 NOTE — TELEPHONE ENCOUNTER
[facility] phone call message- general phone call:    Reason for call: Clarke would like to discuss the following medications    Return call needed: Yes    OK to leave a message on voice mail? Yes    Primary language: English      needed? No    Call taken on August 24, 2023 at 10:25 AM by Maxi Corey

## 2023-08-25 RX ORDER — METOPROLOL SUCCINATE 100 MG/1
100 TABLET, EXTENDED RELEASE ORAL DAILY
Qty: 30 TABLET | Refills: 0 | Status: SHIPPED | OUTPATIENT
Start: 2023-08-25 | End: 2023-09-13

## 2023-08-25 RX ORDER — TORSEMIDE 100 MG/1
100 TABLET ORAL DAILY
Qty: 30 TABLET | Refills: 0 | Status: SHIPPED | OUTPATIENT
Start: 2023-08-25 | End: 2023-09-13

## 2023-08-25 RX ORDER — POTASSIUM CHLORIDE 1500 MG/1
40 TABLET, EXTENDED RELEASE ORAL 2 TIMES DAILY
Qty: 120 TABLET | Refills: 0 | Status: SHIPPED | OUTPATIENT
Start: 2023-08-25 | End: 2023-09-13

## 2023-08-25 NOTE — TELEPHONE ENCOUNTER
Recently noted that he needed Torsemide refill. Called for an order with [facility] and didn't hear from them. 5 days ago called them since hadn't heard. He was told that he would contact us immediately with us. Turns out that [facility] never did fill the Rxs.     Needs the Metoprolol, Torsemide, Potassium.   I confirmed that these were called in.   Sweta Longoria MD

## 2023-08-25 NOTE — TELEPHONE ENCOUNTER
Called patient and let him know scripts were sent to [facility], if he has any further problems to give us a call.    Teodora Webber RN

## 2023-08-28 DIAGNOSIS — G47.33 OBSTRUCTIVE SLEEP APNEA (ADULT) (PEDIATRIC): Primary | ICD-10-CM

## 2023-09-07 ENCOUNTER — VIRTUAL VISIT (OUTPATIENT)
Dept: PSYCHOLOGY | Facility: CLINIC | Age: 73
End: 2023-09-07
Payer: COMMERCIAL

## 2023-09-07 DIAGNOSIS — F41.1 GAD (GENERALIZED ANXIETY DISORDER): ICD-10-CM

## 2023-09-07 DIAGNOSIS — F33.1 MODERATE EPISODE OF RECURRENT MAJOR DEPRESSIVE DISORDER (H): ICD-10-CM

## 2023-09-07 DIAGNOSIS — F34.1 PERSISTENT DEPRESSIVE DISORDER: Primary | ICD-10-CM

## 2023-09-07 PROCEDURE — 90834 PSYTX W PT 45 MINUTES: CPT | Mod: 95 | Performed by: PSYCHOLOGIST

## 2023-09-07 NOTE — PROGRESS NOTES
Telemedicine Visit: The patient's condition can be safely assessed and treated via audio encounter.      Reason for Telemedicine Visit: Patient has requested telehealth visit and Patient unable to travel    Originating Site (Patient Location): Patient's home    Distant Location (provider location):  On-site    Consent:  The patient/guardian has verbally consented to: the potential risks and benefits of telemedicine (video visit) versus in person care; bill my insurance or make self-payment for services provided; and responsibility for payment of non-covered services.     Mode of Communication:  Telephone call via doximity    As the provider I attest to compliance with applicable laws and regulations related to telemedicine.    Behavioral Health Progress Note    Client's Legal Name: Clarke Moreira    Client's Preferred Name: Clarke  YOB: 1950  Type of Service: telephone  Length of Service:   Start time: 9:04  End time: 9:52  Duration: 48 minutes  Attendees: patient    Identifying Information and Presenting Problem:  Clarke is a 73 year old male who is being seen for problematic symptoms of depression, low self-worth, and negative view of himself resulting from childhood trauma and other subsequent life circumstances.    Treatment Objective(s) Addressed in This Session:  Goal 1: Clarke will learn/use coping skills to continue to reduce intensity/frequency of suicidal thoughts     Goal 2: Clarke will challenge/reframe negative harsh thoughts that he has about himself.     Progress on / Status of Treatment Objective(s) / Homework:  Depression has felt more intense    Mental Health Screening Questionnaires:  PHQ-9:       11/9/2020    11:07 AM 12/13/2022     9:46 AM 7/3/2023     8:38 AM   PHQ   PHQ-9 Total Score 22 18 11   Q9: Thoughts of better off dead/self-harm past 2 weeks More than half the days Several days Not at all      CHRIS-7:       12/11/2018    10:31 AM 5/10/2019     8:26 AM 9/3/2019     "10:35 AM   CHRIS-7 SCORE   Total Score 20 13 19     Topics Discussed/Interventions Provided:  Session today focused on Clarke's desire for social connection that is accompanied by a deep fear of connecting with others.  Clarke shared two examples of situations where he tried to interact with other people and they did not respond the way he expected which led to many hours of rumination and \"raking myself over the coals.\"  This leaves him feeling like all the negative thoughts about himself are true. Clarke expresses that he would like to try to interact with another person \"normally.\" Discussed different ways he might decide to socially engage with others in the future. Continue to challenge and reframe negative beliefs about himself.      Mental Status Exam:  During his visit, Clarke was open, engaged, respectful, and pleasant .  He was alert and oriented to all spheres . Since this is a phone visit, I'm not able to comment on dress, grooming, eye contact, or psychomotor functioning.  Speech was his usual rate, rhythm and tone. He stated that his mood is \"down.\"  Affect was depressed, subdued. Thought processes were logical and coherent. Thought content was more hopeless, but no evidence of psychotic features and easily dismissed passive thoughts of death with no other self-directed violence related thinking Intent, urges, planning, behavior/recent attempts. Memory appeared grossly intact with no formal evaluation. Insight is good. Judgment is good. Patient exhibited good impulse control during the appointment.     Does the patient appear to be at imminent risk of harm to self/others at this time? No    The session was necessary to address chronic depression and anxiety symptoms that interfere with his ability to function in areas related to interacting and relating with others, social relationships, maintaining personal health and physical well-being, day to day self care or health behaviors, participating in " meaningful activities and instrumental activities of daily living (IADLs). Ongoing psychotherapy is necessary to stabilize mood, provide counseling, improve functioning with daily activities and provide support.    DSM-5-TR Diagnosis:  Persistent Depressive Disorder  Major Depression, recurrent, moderate  Generalized Anxiety Disorder    Plan:  - Follow up with this provider 9/25  - Update treatment plan  - Reminded him of upcoming appt with Dr. Gabriel for 9/13      Lety Buenrostro PsyD    NOTE: Treatment plan update due 7/25/23.  Diagnostic assessment update due 1/25/24.

## 2023-09-13 ENCOUNTER — OFFICE VISIT (OUTPATIENT)
Dept: FAMILY MEDICINE | Facility: CLINIC | Age: 73
End: 2023-09-13
Payer: COMMERCIAL

## 2023-09-13 ENCOUNTER — ANTICOAGULATION THERAPY VISIT (OUTPATIENT)
Dept: ANTICOAGULATION | Facility: CLINIC | Age: 73
End: 2023-09-13

## 2023-09-13 VITALS
HEIGHT: 71 IN | HEART RATE: 86 BPM | SYSTOLIC BLOOD PRESSURE: 126 MMHG | OXYGEN SATURATION: 99 % | WEIGHT: 315 LBS | DIASTOLIC BLOOD PRESSURE: 79 MMHG | BODY MASS INDEX: 44.1 KG/M2 | TEMPERATURE: 97.4 F | RESPIRATION RATE: 20 BRPM

## 2023-09-13 DIAGNOSIS — G63 POLYNEUROPATHY ASSOCIATED WITH UNDERLYING DISEASE (H): ICD-10-CM

## 2023-09-13 DIAGNOSIS — I48.0 PAROXYSMAL ATRIAL FIBRILLATION (H): ICD-10-CM

## 2023-09-13 DIAGNOSIS — R33.9 URINARY RETENTION: ICD-10-CM

## 2023-09-13 DIAGNOSIS — F33.1 MODERATE EPISODE OF RECURRENT MAJOR DEPRESSIVE DISORDER (H): ICD-10-CM

## 2023-09-13 DIAGNOSIS — I48.19 PERSISTENT ATRIAL FIBRILLATION (H): ICD-10-CM

## 2023-09-13 DIAGNOSIS — I48.20 CHRONIC ATRIAL FIBRILLATION (H): Primary | ICD-10-CM

## 2023-09-13 DIAGNOSIS — J96.12 CHRONIC HYPERCAPNIC RESPIRATORY FAILURE (H): ICD-10-CM

## 2023-09-13 DIAGNOSIS — I89.0 LYMPHEDEMA: ICD-10-CM

## 2023-09-13 DIAGNOSIS — Z12.11 SCREEN FOR COLON CANCER: Primary | ICD-10-CM

## 2023-09-13 DIAGNOSIS — I50.30 HEART FAILURE WITH PRESERVED EJECTION FRACTION, UNSPECIFIED HF CHRONICITY (H): ICD-10-CM

## 2023-09-13 DIAGNOSIS — E03.9 HYPOTHYROIDISM, UNSPECIFIED TYPE: ICD-10-CM

## 2023-09-13 DIAGNOSIS — Z00.00 ENCOUNTER FOR MEDICARE ANNUAL WELLNESS EXAM: ICD-10-CM

## 2023-09-13 DIAGNOSIS — I73.9 PAD (PERIPHERAL ARTERY DISEASE) (H): ICD-10-CM

## 2023-09-13 DIAGNOSIS — N18.31 STAGE 3A CHRONIC KIDNEY DISEASE (H): ICD-10-CM

## 2023-09-13 DIAGNOSIS — I50.32 CHRONIC HEART FAILURE WITH PRESERVED EJECTION FRACTION (H): ICD-10-CM

## 2023-09-13 DIAGNOSIS — Z79.01 LONG TERM (CURRENT) USE OF ANTICOAGULANTS: ICD-10-CM

## 2023-09-13 DIAGNOSIS — E11.65 TYPE 2 DIABETES MELLITUS WITH HYPERGLYCEMIA, WITHOUT LONG-TERM CURRENT USE OF INSULIN (H): ICD-10-CM

## 2023-09-13 PROBLEM — M86.9 OSTEOMYELITIS OF RIGHT FOOT, UNSPECIFIED TYPE (H): Status: RESOLVED | Noted: 2019-09-03 | Resolved: 2023-09-13

## 2023-09-13 PROBLEM — F33.9 MAJOR DEPRESSION, RECURRENT (H): Status: ACTIVE | Noted: 2023-09-13

## 2023-09-13 LAB
ALBUMIN SERPL BCG-MCNC: 4.2 G/DL (ref 3.5–5.2)
ALP SERPL-CCNC: 104 U/L (ref 40–129)
ALT SERPL W P-5'-P-CCNC: 15 U/L (ref 0–70)
ANION GAP SERPL CALCULATED.3IONS-SCNC: 14 MMOL/L (ref 7–15)
AST SERPL W P-5'-P-CCNC: 22 U/L (ref 0–45)
BILIRUB SERPL-MCNC: 0.6 MG/DL
BUN SERPL-MCNC: 18 MG/DL (ref 8–23)
CALCIUM SERPL-MCNC: 9.9 MG/DL (ref 8.8–10.2)
CHLORIDE SERPL-SCNC: 98 MMOL/L (ref 98–107)
CHOLEST SERPL-MCNC: 126 MG/DL
CREAT SERPL-MCNC: 1.25 MG/DL (ref 0.67–1.17)
CREAT UR-MCNC: 114 MG/DL
DEPRECATED HCO3 PLAS-SCNC: 27 MMOL/L (ref 22–29)
EGFRCR SERPLBLD CKD-EPI 2021: 61 ML/MIN/1.73M2
GLUCOSE SERPL-MCNC: 135 MG/DL (ref 70–99)
HDLC SERPL-MCNC: 41 MG/DL
INR PPP: 2.27 (ref 0.85–1.15)
LDLC SERPL CALC-MCNC: 58 MG/DL
MICROALBUMIN UR-MCNC: 13.4 MG/L
MICROALBUMIN/CREAT UR: 11.75 MG/G CR (ref 0–17)
NONHDLC SERPL-MCNC: 85 MG/DL
POTASSIUM SERPL-SCNC: 3.9 MMOL/L (ref 3.4–5.3)
PROT SERPL-MCNC: 7.8 G/DL (ref 6.4–8.3)
SODIUM SERPL-SCNC: 139 MMOL/L (ref 136–145)
TRIGL SERPL-MCNC: 135 MG/DL
TSH SERPL DL<=0.005 MIU/L-ACNC: 1.3 UIU/ML (ref 0.3–4.2)

## 2023-09-13 PROCEDURE — 36415 COLL VENOUS BLD VENIPUNCTURE: CPT | Performed by: FAMILY MEDICINE

## 2023-09-13 PROCEDURE — 80061 LIPID PANEL: CPT | Performed by: FAMILY MEDICINE

## 2023-09-13 PROCEDURE — 82043 UR ALBUMIN QUANTITATIVE: CPT | Performed by: FAMILY MEDICINE

## 2023-09-13 PROCEDURE — G0008 ADMIN INFLUENZA VIRUS VAC: HCPCS | Performed by: FAMILY MEDICINE

## 2023-09-13 PROCEDURE — 85610 PROTHROMBIN TIME: CPT | Performed by: FAMILY MEDICINE

## 2023-09-13 PROCEDURE — 80053 COMPREHEN METABOLIC PANEL: CPT | Performed by: FAMILY MEDICINE

## 2023-09-13 PROCEDURE — 90662 IIV NO PRSV INCREASED AG IM: CPT | Performed by: FAMILY MEDICINE

## 2023-09-13 PROCEDURE — 82570 ASSAY OF URINE CREATININE: CPT | Performed by: FAMILY MEDICINE

## 2023-09-13 PROCEDURE — G0439 PPPS, SUBSEQ VISIT: HCPCS | Performed by: FAMILY MEDICINE

## 2023-09-13 PROCEDURE — 84443 ASSAY THYROID STIM HORMONE: CPT | Performed by: FAMILY MEDICINE

## 2023-09-13 PROCEDURE — 99213 OFFICE O/P EST LOW 20 MIN: CPT | Mod: 25 | Performed by: FAMILY MEDICINE

## 2023-09-13 RX ORDER — TORSEMIDE 100 MG/1
100 TABLET ORAL DAILY
Qty: 90 TABLET | Refills: 3 | Status: SHIPPED | OUTPATIENT
Start: 2023-09-13 | End: 2024-03-15

## 2023-09-13 RX ORDER — WARFARIN SODIUM 5 MG/1
TABLET ORAL
Qty: 180 TABLET | Refills: 3 | Status: SHIPPED | OUTPATIENT
Start: 2023-09-13 | End: 2024-03-15

## 2023-09-13 RX ORDER — POTASSIUM CHLORIDE 1500 MG/1
40 TABLET, EXTENDED RELEASE ORAL 2 TIMES DAILY
Qty: 120 TABLET | Refills: 3 | Status: SHIPPED | OUTPATIENT
Start: 2023-09-13 | End: 2024-01-26

## 2023-09-13 RX ORDER — TAMSULOSIN HYDROCHLORIDE 0.4 MG/1
0.4 CAPSULE ORAL DAILY
Qty: 90 CAPSULE | Refills: 3 | Status: SHIPPED | OUTPATIENT
Start: 2023-09-13 | End: 2024-03-15

## 2023-09-13 RX ORDER — METOPROLOL SUCCINATE 100 MG/1
100 TABLET, EXTENDED RELEASE ORAL DAILY
Qty: 90 TABLET | Refills: 3 | Status: SHIPPED | OUTPATIENT
Start: 2023-09-13 | End: 2024-03-15

## 2023-09-13 RX ORDER — LEVOTHYROXINE SODIUM 175 UG/1
175 TABLET ORAL
Qty: 90 TABLET | Refills: 3 | Status: SHIPPED | OUTPATIENT
Start: 2023-09-13 | End: 2024-03-15

## 2023-09-13 RX ORDER — SPIRONOLACTONE 25 MG/1
50 TABLET ORAL DAILY
Qty: 180 TABLET | Refills: 3 | Status: SHIPPED | OUTPATIENT
Start: 2023-09-13 | End: 2024-03-15

## 2023-09-13 ASSESSMENT — ENCOUNTER SYMPTOMS
COUGH: 0
NERVOUS/ANXIOUS: 1
EYE PAIN: 0
DIZZINESS: 1
CONSTIPATION: 0
ABDOMINAL PAIN: 0
DIARRHEA: 0
HEMATURIA: 0
HEMATOCHEZIA: 0

## 2023-09-13 ASSESSMENT — COLUMBIA-SUICIDE SEVERITY RATING SCALE - C-SSRS
2. IN THE PAST MONTH, HAVE YOU ACTUALLY HAD ANY THOUGHTS OF KILLING YOURSELF?: NO
6. HAVE YOU EVER DONE ANYTHING, STARTED TO DO ANYTHING, OR PREPARED TO DO ANYTHING TO END YOUR LIFE?: YES, LIFETIME
1. WITHIN THE PAST MONTH, HAVE YOU WISHED YOU WERE DEAD OR WISHED YOU COULD GO TO SLEEP AND NOT WAKE UP?: YES

## 2023-09-13 ASSESSMENT — PATIENT HEALTH QUESTIONNAIRE - PHQ9
SUM OF ALL RESPONSES TO PHQ QUESTIONS 1-9: 24
10. IF YOU CHECKED OFF ANY PROBLEMS, HOW DIFFICULT HAVE THESE PROBLEMS MADE IT FOR YOU TO DO YOUR WORK, TAKE CARE OF THINGS AT HOME, OR GET ALONG WITH OTHER PEOPLE: EXTREMELY DIFFICULT
SUM OF ALL RESPONSES TO PHQ QUESTIONS 1-9: 24

## 2023-09-13 ASSESSMENT — ACTIVITIES OF DAILY LIVING (ADL): CURRENT_FUNCTION: NO ASSISTANCE NEEDED

## 2023-09-13 NOTE — PROGRESS NOTES
ANTICOAGULATION MANAGEMENT     Clarke Moreira 73 year old male is on warfarin with therapeutic INR result. (Goal INR 2.0-3.0)    Recent labs: (last 7 days)     09/13/23  1034   INR 2.27*       ASSESSMENT     Source(s): Chart Review and Patient/Caregiver Call     Warfarin doses taken: Warfarin taken as instructed  Diet: No new diet changes identified  Medication/supplement changes: None noted  New illness, injury, or hospitalization: No   [facility] visit today with Dr. Shepard - no changes.  Signs or symptoms of bleeding or clotting: No  Previous result: Therapeutic last 5 INR visits.  Additional findings: None       PLAN     Recommended plan for no diet, medication or health factor changes affecting INR     Dosing Instructions: Continue your current warfarin dose with next INR in 6 weeks       Summary  As of 9/13/2023      Full warfarin instructions:  5 mg every Thu; 10 mg all other days   Next INR check:  10/25/2023               Telephone call with Clarke who verbalizes understanding and agrees to plan    Patient offered & declined to schedule next visit    Education provided:   Taking warfarin: Importance of taking warfarin as instructed  Goal range and lab monitoring: goal range and significance of current result    Plan made per [facility] anticoagulation protocol    Mirna Mcknight RN  Anticoagulation Clinic  9/13/2023    _______________________________________________________________________     Anticoagulation Episode Summary       Current INR goal:  2.0-3.0   TTR:  100.0 % (2.4 mo)   Target end date:  Indefinite   Send INR reminders to:  [facility]    Indications    Chronic atrial fibrillation (H) [I48.20]  Long term (current) use of anticoagulants [Z79.01]  Paroxysmal atrial fibrillation (H) [I48.0]             Comments:  Home Care             Anticoagulation Care Providers       Provider Role Specialty Phone number    Matthias Sanchez MD Referring Family Medicine [phone]

## 2023-09-13 NOTE — PROGRESS NOTES
"SUBJECTIVE:   Clarke is a 73 year old who presents for Preventive Visit.      7/3/2023     8:36 AM   Additional Questions   Roomed by shruthi   Accompanied by self       Are you in the first 12 months of your Medicare coverage?  No  Scored       12/13/2022     9:46 AM 7/3/2023     8:38 AM 9/13/2023     9:19 AM   PHQ   PHQ-9 Total Score 18 11 24   Q9: Thoughts of better off dead/self-harm past 2 weeks Several days Not at all Nearly every day   F/U: Thoughts of suicide or self-harm   Yes   F/U: Self harm-plan   No   F/U: Self-harm action   No   F/U: Safety concerns   No    Patient reports that he is chronically suicidal and has no active plans to end his life in fact feels better now that he know his condition is terminal.  Patient has a mental health provider and reports currently doing ok.  LymphedemaSleeves   Requested a DME order for [facility]  Needs referral   Refills  Reviewed  Last Comprehensive Metabolic Panel:  Lab Results   Component Value Date     07/03/2023    POTASSIUM 4.7 07/03/2023    CHLORIDE 98 07/03/2023    CO2 26 07/03/2023    ANIONGAP 15 07/03/2023     (H) 07/03/2023    BUN 15.8 07/03/2023    CR 1.27 (H) 07/03/2023    GFRESTIMATED 60 (L) 07/03/2023    CHERI 10.2 07/03/2023        Healthy Habits:     In general, how would you rate your overall health?  Poor    Frequency of exercise:  None    Do you usually eat at least 4 servings of fruit and vegetables a day, include whole grains    & fiber and avoid regularly eating high fat or \"junk\" foods?  Yes    Taking medications regularly:  Yes    Medication side effects:  None    Ability to successfully perform activities of daily living:  No assistance needed    Home Safety:  No safety concerns identified    Hearing Impairment:  No hearing concerns    In the past 6 months, have you been bothered by leaking of urine? Yes    In general, how would you rate your overall mental or emotional health?  Poor    Additional concerns today:  No        Have you " ever done Advance Care Planning? (For example, a Health Directive, POLST, or a discussion with a medical provider or your loved ones about your wishes):        Fall risk  Patient at risk of falls    Cognitive Screening   1) Repeat 3 items (Leader, Season, Table)    2) Clock draw: NORMAL  3) 3 item recall: Recalls 2 objects   Results: NORMAL clock, 1-2 items recalled: COGNITIVE IMPAIRMENT LESS LIKELY    Mini-CogTM Copyright KELTON Harper. Licensed by the author for use in [facility]; reprinted with permission (herson@[facility]). All rights reserved.          Reviewed and updated as needed this visit by clinical staff   Tobacco  Allergies  Meds  Problems  Med Hx  Surg Hx  Fam Hx          Reviewed and updated as needed this visit by Provider   Tobacco  Allergies  Meds  Problems  Med Hx  Surg Hx  Fam Hx         Social History     Tobacco Use    Smoking status: Never    Smokeless tobacco: Never   Substance Use Topics    Alcohol use: No     Comment: Former alcohol abuse. Quit 70s then quit later in 80s-present             9/13/2023     9:26 AM   Alcohol Use   Prescreen: >3 drinks/day or >7 drinks/week? Not Applicable     Do you have a current opioid prescription? No  Do you use any other controlled substances or medications that are not prescribed by a provider? None      Current providers sharing in care for this patient include:   Patient Care Team:  Henrry Shepard MD as PCP - General (Family Medicine)  Jada Mckeon APRN CNP as Nurse Practitioner (Cardiology)  Marta Heath RN as Nurse Coordinator (Cardiology)  Sybil Norman APRN CNP as Nurse Practitioner (Cardiology)  Lety Buenrostro PsyD as Psychologist (PSYCHOLOGIST CLINICAL)  Jennifer Lou PA-C as Physician Assistant (Physician Assistant - Medical)  Jesús Lagos DPM as MD (Podiatrist Primary Podiatric Medicine)  Ida Reese, RN as Specialty Care Coordinator (Cardiology)  Marychuy Lopez PA-C as  Physician Assistant (Cardiology)  Stevo Mathew OD as MD (Optometry)  Jesús Lagos DPM as Assigned Musculoskeletal Provider  Stevo Mathew OD as MD (Optometry)  Christian Willoughby MD as MD (Cardiovascular Disease)  Vielka Bull OD (Optometry)  Leyt Buenrostro PsyD as Assigned Behavioral Health Provider  Henrry Shepard MD as Assigned PCP    The following health maintenance items are reviewed in Epic and correct as of today:  Health Maintenance   Topic Date Due    HF ACTION PLAN  Never done    DEPRESSION ACTION PLAN  Never done    ZOSTER IMMUNIZATION (1 of 2) Never done    DIABETIC FOOT EXAM  05/16/2020    MICROALBUMIN  06/21/2022    COLORECTAL CANCER SCREENING  06/24/2022    EYE EXAM  12/02/2022    FALL RISK ASSESSMENT  12/02/2022    COVID-19 Vaccine (5 - Moderna series) 02/07/2023    LIPID  05/09/2023    INFLUENZA VACCINE (1) 09/01/2023    A1C  10/03/2023    PHQ-9  12/13/2023    BMP  01/03/2024    ALT  07/03/2024    CBC  07/03/2024    HEMOGLOBIN  07/03/2024    MEDICARE ANNUAL WELLNESS VISIT  09/13/2024    ADVANCE CARE PLANNING  08/13/2026    DTAP/TDAP/TD IMMUNIZATION (3 - Td or Tdap) 11/27/2027    TSH W/FREE T4 REFLEX  Completed    HEPATITIS C SCREENING  Completed    Pneumococcal Vaccine: 65+ Years  Completed    URINALYSIS  Completed    AORTIC ANEURYSM SCREENING (SYSTEM ASSIGNED)  Completed    IPV IMMUNIZATION  Aged Out    HPV IMMUNIZATION  Aged Out    MENINGITIS IMMUNIZATION  Aged Out               Review of Systems   HENT:  Positive for congestion. Negative for ear pain.    Eyes:  Negative for pain.   Respiratory:  Negative for cough.    Cardiovascular:  Positive for chest pain.   Gastrointestinal:  Negative for abdominal pain, constipation, diarrhea and hematochezia.   Genitourinary:  Negative for hematuria.   Neurological:  Positive for dizziness.   Psychiatric/Behavioral:  The patient is nervous/anxious.          OBJECTIVE:   /79   Pulse 86   Temp 97.4  F  "(36.3  C)   Resp 20   Ht 1.803 m (5' 11\")   Wt (!) 167.4 kg (369 lb)   SpO2 99%   BMI 51.47 kg/m   Estimated body mass index is 51.47 kg/m  as calculated from the following:    Height as of this encounter: 1.803 m (5' 11\").    Weight as of this encounter: 167.4 kg (369 lb).  Physical Exam  Vitals reviewed.   Constitutional:       General: He is not in acute distress.     Appearance: He is well-developed. He is not diaphoretic.   HENT:      Head: Normocephalic.   Eyes:      General: No scleral icterus.     Conjunctiva/sclera: Conjunctivae normal.   Neck:      Thyroid: No thyromegaly.   Cardiovascular:      Rate and Rhythm: Normal rate and regular rhythm.      Heart sounds: Normal heart sounds. No murmur heard.  Pulmonary:      Effort: Pulmonary effort is normal. No respiratory distress.      Breath sounds: Normal breath sounds. No wheezing.   Musculoskeletal:         General: Swelling (significant bilateral lymphedema that goes up to his knees) present.      Cervical back: Normal range of motion.      Left lower leg: No edema.   Lymphadenopathy:      Cervical: No cervical adenopathy.   Skin:     General: Skin is warm and dry.   Neurological:      Mental Status: He is alert and oriented to person, place, and time.      Gait: Gait abnormal (uses 4 wheel walker.).   Psychiatric:         Behavior: Behavior normal.         Thought Content: Thought content normal.         Judgment: Judgment normal.               ASSESSMENT / PLAN:       ICD-10-CM    1. Screen for colon cancer  Z12.11 Fecal colorectal cancer screen FIT - Future (S+30)     Fecal colorectal cancer screen FIT - Future (S+30)      2. Type 2 diabetes mellitus with hyperglycemia, without long-term current use of insulin (H)  E11.65 Adult Eye  Referral     Lipid panel reflex to direct LDL Non-fasting     Comprehensive Metabolic Panel     Albumin Random Urine Quantitative with Creat Ratio     Lipid panel reflex to direct LDL Non-fasting     " Comprehensive Metabolic Panel     Albumin Random Urine Quantitative with Creat Ratio      3. Moderate episode of recurrent major depressive disorder (H)  F33.1 Stable       4. Lymphedema  I89.0 Miscellaneous DME Order      5. Hypothyroidism, unspecified type  E03.9 levothyroxine (SYNTHROID/LEVOTHROID) 175 MCG tablet     TSH with free T4 reflex     TSH with free T4 reflex      6. Paroxysmal atrial fibrillation (H)  I48.0 metoprolol succinate ER (TOPROL XL) 100 MG 24 hr tablet      7. Chronic heart failure with preserved ejection fraction (H)  I50.32 potassium chloride ER (KLOR-CON) 20 MEQ CR tablet     torsemide (DEMADEX) 100 MG tablet      8. Heart failure with preserved ejection fraction, unspecified HF chronicity (H)  I50.30 spironolactone (ALDACTONE) 25 MG tablet      9. Urinary retention  R33.9 tamsulosin (FLOMAX) 0.4 MG capsule      10. Persistent atrial fibrillation (H)  I48.19 INR     warfarin ANTICOAGULANT (COUMADIN) 5 MG tablet     INR      11. Encounter for Medicare annual wellness exam  Z00.00       12. Polyneuropathy associated with underlying disease (H)  G63 Stable no action needed      13. PAD (peripheral artery disease) (H)  I73.9 Stable reports chronic wounds have healed      14. Chronic hypercapnic respiratory failure (H)  J96.12 Working toget his CPAPworking       15. Body mass index (BMI) 50.0-59.9, adult (H)  Z68.43 Patient not able to change signficantly      16. Stage 3a chronic kidney disease (H)  N18.31 Advised to manage BP, avoid NSAIDS and continue rosuvastatin          Patient has been advised of split billing requirements and indicates understanding: Yes    Depression Screening Follow Up        9/13/2023     9:19 AM   PHQ   PHQ-9 Total Score 24   Q9: Thoughts of better off dead/self-harm past 2 weeks Nearly every day   F/U: Thoughts of suicide or self-harm Yes   F/U: Self harm-plan No   F/U: Self-harm action No   F/U: Safety concerns No           Follow Up       Follow Up Actions  "Taken  Crisis resource information provided in the After Visit Summary  Consult with [facility], monitored patient safety and notified provider.    Discussed the following ways the patient can remain in a safe environment:   continue chronic mental health cares    COUNSELING:  Reviewed preventive health counseling, as reflected in patient instructions       Healthy diet/nutrition      BMI:   Estimated body mass index is 51.47 kg/m  as calculated from the following:    Height as of this encounter: 1.803 m (5' 11\").    Weight as of this encounter: 167.4 kg (369 lb).   Weight management plan: Discussed healthy diet and exercise guidelines      He reports that he has never smoked. He has never used smokeless tobacco.    I spent 30 minutes in addition to the usual time in the [place].  Appropriate preventive services were discussed with this patient, including applicable screening as appropriate for cardiovascular disease, diabetes, osteopenia/osteoporosis, and glaucoma.  As appropriate for age/gender, discussed screening for colorectal cancer, prostate cancer, breast cancer, and cervical cancer. Checklist reviewing preventive services available has been given to the patient.    Reviewed patients plan of care and provided an AVS. The Complex Care Plan (for patients with higher acuity and needing more deliberate coordination of services) for Clarke meets the Care Plan requirement. This Care Plan has been established and reviewed with the Patient.          Henrry Shepard MD  [facility]    Identified Health Risks:  I have reviewed Opioid Use Disorder and Substance Use Disorder risk factors and made any needed referrals. Answers submitted by the patient for this visit:  Patient Health Questionnaire (Submitted on 9/13/2023)  If you checked off any problems, how difficult have these problems made it for you to do your work, take care of things at home, or get along with other people?: Extremely difficult  PHQ9 TOTAL " SCORE: 24  DME (Durable Medical Equipment) Orders and Documentation  Orders Placed This Encounter   Procedures    Miscellaneous DME Order        The patient was assessed and it was determined the patient is in need of the following listed DME Supplies/Equipment. Please complete supporting documentation below to demonstrate medical necessity.      Lab Results   Component Value Date    A1C 6.5 07/03/2023    A1C 6.4 12/13/2022    A1C 6.6 05/09/2022    A1C 6.2 05/06/2021    A1C 5.9 11/09/2020    A1C 5.9 07/09/2020    A1C 6.1 02/26/2019    A1C 5.7 08/28/2018           The patient was provided with suggestions to help him develop a healthy physical lifestyle.  He is at risk for lack of exercise and has been provided with information to increase physical activity for the benefit of his well-being.  Information on urinary incontinence and treatment options given to patient.  The patient was provided with suggestions to help him develop a healthy emotional lifestyle.  The patient's PHQ-9 score is consistent with severe depression. He was provided with information regarding depression and suicide prevention and was advised to schedule a follow up appointment in 8 weeks  weeks to further address this issue.

## 2023-09-13 NOTE — PATIENT INSTRUCTIONS
Patient Education   Personalized Prevention Plan  You are due for the preventive services outlined below.  Your care team is available to assist you in scheduling these services.  If you have already completed any of these items, please share that information with your care team to update in your medical record.  Health Maintenance Due   Topic Date Due     Heart Failure Action Plan  Never done     Depression Action Plan  Never done     Zoster (Shingles) Vaccine (1 of 2) Never done     Diabetic Foot Exam  05/16/2020     Kidney Microalbumin Urine Test  06/21/2022     Colorectal Cancer Screening  06/24/2022     Eye Exam  12/02/2022     FALL RISK ASSESSMENT  12/02/2022     COVID-19 Vaccine (5 - Moderna series) 02/07/2023     Cholesterol Lab  05/09/2023     Flu Vaccine (1) 09/01/2023     Your Health Risk Assessment indicates you feel you are not in good health    A healthy lifestyle helps keep the body fit and the mind alert. It helps protect you from disease, helps you fight disease, and helps prevent chronic disease (disease that doesn't go away) from getting worse. This is important as you get older and begin to notice twinges in muscles and joints and a decline in the strength and stamina you once took for granted. A healthy lifestyle includes good healthcare, good nutrition, weight control, recreation, and regular exercise. Avoid harmful substances and do what you can to keep safe. Another part of a healthy lifestyle is stay mentally active and socially involved.    Good healthcare   Have a wellness visit every year.   If you have new symptoms, let us know right away. Don't wait until the next checkup.   Take medicines exactly as prescribed and keep your medicines in a safe place. Tell us if your medicine causes problems.   Healthy diet and weight control   Eat 3 or 4 small, nutritious, low-fat, high-fiber meals a day. Include a variety of fruits, vegetables, and whole-grain foods.   Make sure you get enough calcium  "in your diet. Calcium, vitamin D, and exercise help prevent osteoporosis (bone thinning).   If you live alone, try eating with others when you can. That way you get a good meal and have company while you eat it.   Try to keep a healthy weight. If you eat more calories than your body uses for energy, it will be stored as fat and you will gain weight.     Recreation   Recreation is not limited to sports and team events. It includes any activity that provides relaxation, interest, enjoyment, and exercise. Recreation provides an outlet for physical, mental, and social energy. It can give a sense of worth and achievement. It can help you stay healthy.    Mental Exercise and Social Involvement  Mental and emotional health is as important as physical health. Keep in touch with friends and family. Stay as active as possible. Continue to learn and challenge yourself.   Things you can do to stay mentally active are:  Learn something new, like a foreign language or musical instrument.   Play SCRABBLE or do crossword puzzles. If you cannot find people to play these games with you at home, you can play them with others on your computer through the Internet.   Join a games club--anything from card games to chess or checkers or lawn bowling.   Start a new hobby.   Go back to school.   Volunteer.   Read.   Keep up with world events.  Learning About Being Physically Active  What is physical activity?     Being physically active means doing any kind of activity that gets your body moving.  The types of physical activity that can help you get fit and stay healthy include:  Aerobic or \"cardio\" activities. These make your heart beat faster and make you breathe harder, such as brisk walking, riding a bike, or running. They strengthen your heart and lungs and build up your endurance.  Strength training activities. These make your muscles work against, or \"resist,\" something. Examples include lifting weights or doing push-ups. These " activities help tone and strengthen your muscles and bones.  Stretches. These let you move your joints and muscles through their full range of motion. Stretching helps you be more flexible.  Reaching a balance between these three types of physical activity is important because each one contributes to your overall fitness.  What are the benefits of being active?  Being active is one of the best things you can do for your health. It helps you to:  Feel stronger and have more energy to do all the things you like to do.  Focus better at school or work.  Feel, think, and sleep better.  Reach and stay at a healthy weight.  Lose fat and build lean muscle.  Lower your risk for serious health problems, including diabetes, heart attack, high blood pressure, and some cancers.  Keep your heart, lungs, bones, muscles, and joints strong and healthy.  How can you make being active part of your life?  Start slowly. Make it your long-term goal to get at least 30 minutes of exercise on most days of the week. Walking is a good choice. You also may want to do other activities, such as running, swimming, cycling, or playing tennis or team sports.  Pick activities that you like--ones that make your heart beat faster, your muscles stronger, and your muscles and joints more flexible. If you find more than one thing you like doing, do them all. You don't have to do the same thing every day.  Get your heart pumping every day. Any activity that makes your heart beat faster and keeps it at that rate for a while counts.  Here are some great ways to get your heart beating faster:  Go for a brisk walk, run, or bike ride.  Go for a hike or swim.  Go in-line skating.  Play a game of touch football, basketball, or soccer.  Ride a bike.  Play tennis or racquetball.  Climb stairs.  Even some household chores can be aerobic--just do them at a faster pace. Vacuuming, raking or mowing the lawn, sweeping the garage, and washing and waxing the car all can  "help get your heart rate up.  Strengthen your muscles during the week. You don't have to lift heavy weights or grow big, bulky muscles to get stronger. Doing a few simple activities that make your muscles work against, or \"resist,\" something can help you get stronger.  For example, you can:  Do push-ups or sit-ups, which use your own body weight as resistance.  Lift weights or dumbbells or use stretch bands at home or in a gym or community center.  Stretch your muscles often. Stretching will help you as you become more active. It can help you stay flexible, loosen tight muscles, and avoid injury. It can also help improve your balance and posture and can be a great way to relax.  Be sure to stretch the muscles you'll be using when you work out. It's best to warm your muscles slightly before you stretch them. Walk or do some other light aerobic activity for a few minutes, and then start stretching.  When you stretch your muscles:  Do it slowly. Stretching is not about going fast or making sudden movements.  Don't push or bounce during a stretch.  Hold each stretch for at least 15 to 30 seconds, if you can. You should feel a stretch in the muscle, but not pain.  Breathe out as you do the stretch. Then breathe in as you hold the stretch. Don't hold your breath.  If you're worried about how more activity might affect your health, have a checkup before you start. Follow any special advice your doctor gives you for getting a smart start.  Where can you learn more?  Go to https://www.[employer].net/patiented  Enter W332 in the search box to learn more about \"Learning About Being Physically Active.\"  Current as of: October 10, 2022               Content Version: 13.7    2708-0710 [employer].   Care instructions adapted under license by your healthcare professional. If you have questions about a medical condition or this instruction, always ask your healthcare professional. [employer] disclaims any " warranty or liability for your use of this information.      Bladder Training: Care Instructions  Your Care Instructions     Bladder training is used to treat urge incontinence and stress incontinence. Urge incontinence means that the need to urinate comes on so fast that you can't get to a toilet in time. Stress incontinence means that you leak urine because of pressure on your bladder. For example, it may happen when you laugh, cough, or lift something heavy.  Bladder training can increase how long you can wait before you have to urinate. It can also help your bladder hold more urine. And it can give you better control over the urge to urinate.  It is important to remember that bladder training takes a few weeks to a few months to make a difference. You may not see results right away, but don't give up.  Follow-up care is a key part of your treatment and safety. Be sure to make and go to all appointments, and call your doctor if you are having problems. It's also a good idea to know your test results and keep a list of the medicines you take.  How can you care for yourself at home?  Work with your doctor to come up with a bladder training program that is right for you. You may use one or more of the following methods.  Delayed urination  In the beginning, try to keep from urinating for 5 minutes after you first feel the need to go.  While you wait, take deep, slow breaths to relax. Kegel exercises can also help you delay the need to go to the bathroom.  After some practice, when you can easily wait 5 minutes to urinate, try to wait 10 minutes before you urinate.  Slowly increase the waiting period until you are able to control when you have to urinate.  Scheduled urination  Empty your bladder when you first wake up in the morning.  Schedule times throughout the day when you will urinate.  Start by going to the bathroom every hour, even if you don't need to go.  Slowly increase the time between trips to the  "bathroom.  When you have found a schedule that works well for you, keep doing it.  If you wake up during the night and have to urinate, do it. Apply your schedule to waking hours only.  Kegel exercises  These tighten and strengthen pelvic muscles, which can help you control the flow of urine. (If doing these exercises causes pain, stop doing them and talk with your doctor.) To do Kegel exercises:  Squeeze your muscles as if you were trying not to pass gas. Or squeeze your muscles as if you were stopping the flow of urine. Your belly, legs, and buttocks shouldn't move.  Hold the squeeze for 3 seconds, then relax for 5 to 10 seconds.  Start with 3 seconds, then add 1 second each week until you are able to squeeze for 10 seconds.  Repeat the exercise 10 times a session. Do 3 to 8 sessions a day.  When should you call for help?  Watch closely for changes in your health, and be sure to contact your doctor if:    Your incontinence is getting worse.     You do not get better as expected.   Where can you learn more?  Go to https://www.[employer].net/patiented  Enter V684 in the search box to learn more about \"Bladder Training: Care Instructions.\"  Current as of: March 1, 2023               Content Version: 13.7    0582-7813 [employer].   Care instructions adapted under license by your healthcare professional. If you have questions about a medical condition or this instruction, always ask your healthcare professional. [employer] disclaims any warranty or liability for your use of this information.      Your Health Risk Assessment indicates you feel you are not in good emotional health.    Recreation   Recreation is not limited to sports and team events. It includes any activity that provides relaxation, interest, enjoyment, and exercise. Recreation provides an outlet for physical, mental, and social energy. It can give a sense of worth and achievement. It can help you stay healthy.    Mental " Exercise and Social Involvement  Mental and emotional health is as important as physical health. Keep in touch with friends and family. Stay as active as possible. Continue to learn and challenge yourself.   Things you can do to stay mentally active are:  Learn something new, like a foreign language or musical instrument.   Play SCRABBLE or do crossword puzzles. If you cannot find people to play these games with you at home, you can play them with others on your computer through the Internet.   Join a games club--anything from card games to chess or checkers or lawn bowling.   Start a new hobby.   Go back to school.   Volunteer.   Read.   Keep up with world events.  Learning About Depression Screening  What is depression screening?  Depression screening is a way to see if you have depression symptoms. It may be done by a doctor or counselor. It's often part of a routine checkup. That's because your mental health is just as important as your physical health.  Depression is a mental health condition that affects how you feel, think, and act. You may:  Have less energy.  Lose interest in your daily activities.  Feel sad and grouchy for a long time.  Depression is very common. It affects people of all ages.  Many things can lead to depression. Some people become depressed after they have a stroke or find out they have a major illness like cancer or heart disease. The death of a loved one or a breakup may lead to depression. It can run in families. Most experts believe that a combination of inherited genes and stressful life events can cause it.  What happens during screening?  You may be asked to fill out a form about your depression symptoms. You and the doctor will discuss your answers. The doctor may ask you more questions to learn more about how you think, act, and feel.  What happens after screening?  If you have symptoms of depression, your doctor will talk to you about your options.  Doctors usually treat  "depression with medicines or counseling. Often, combining the two works best. Many people don't get help because they think that they'll get over the depression on their own. But people with depression may not get better unless they get treatment.  The cause of depression is not well understood. There may be many factors involved. But if you have depression, it's not your fault.  A serious symptom of depression is thinking about death or suicide. If you or someone you care about talks about this or about feeling hopeless, get help right away.  It's important to know that depression can be treated. Medicine, counseling, and self-care may help.  Where can you learn more?  Go to https://www.[employer].net/patiented  Enter T185 in the search box to learn more about \"Learning About Depression Screening.\"  Current as of: October 20, 2022               Content Version: 13.7    5695-2273 [employer].   Care instructions adapted under license by your healthcare professional. If you have questions about a medical condition or this instruction, always ask your healthcare professional. [employer] disclaims any warranty or liability for your use of this information.         "

## 2023-09-13 NOTE — LETTER
September 14, 2023      Clarke Moreira  [address]  [place] [place] [address]        Dear Clarke,    Thank you for getting your care at [facility]. Please see below for your test results.  Overall things look pretty good. Your kidney function is stable.  I don't think we need to change anything in the short term.  Resulted Orders   Lipid panel reflex to direct LDL Non-fasting   Result Value Ref Range    Cholesterol 126 <200 mg/dL    Triglycerides 135 <150 mg/dL    Direct Measure HDL 41 >=40 mg/dL    LDL Cholesterol Calculated 58 <=100 mg/dL    Non HDL Cholesterol 85 <130 mg/dL    Narrative    Cholesterol  Desirable:  <200 mg/dL    Triglycerides  Normal:  Less than 150 mg/dL  Borderline High:  150-199 mg/dL  High:  200-499 mg/dL  Very High:  Greater than or equal to 500 mg/dL    Direct Measure HDL  Female:  Greater than or equal to 50 mg/dL   Male:  Greater than or equal to 40 mg/dL    LDL Cholesterol  Desirable:  <100mg/dL  Above Desirable:  100-129 mg/dL   Borderline High:  130-159 mg/dL   High:  160-189 mg/dL   Very High:  >= 190 mg/dL    Non HDL Cholesterol  Desirable:  130 mg/dL  Above Desirable:  130-159 mg/dL  Borderline High:  160-189 mg/dL  High:  190-219 mg/dL  Very High:  Greater than or equal to 220 mg/dL   Comprehensive Metabolic Panel   Result Value Ref Range    Sodium 139 136 - 145 mmol/L    Potassium 3.9 3.4 - 5.3 mmol/L    Chloride 98 98 - 107 mmol/L    Carbon Dioxide (CO2) 27 22 - 29 mmol/L    Anion Gap 14 7 - 15 mmol/L    Urea Nitrogen 18.0 8.0 - 23.0 mg/dL    Creatinine 1.25 (H) 0.67 - 1.17 mg/dL    Calcium 9.9 8.8 - 10.2 mg/dL    Glucose 135 (H) 70 - 99 mg/dL    Alkaline Phosphatase 104 40 - 129 U/L    AST 22 0 - 45 U/L      Comment:      Reference intervals for this test were updated on 6/12/2023 to more accurately reflect our healthy population. There may be differences in the flagging of prior results with similar values performed with this method. Interpretation of those  prior results can be made in the context of the updated reference intervals.    ALT 15 0 - 70 U/L      Comment:      Reference intervals for this test were updated on 6/12/2023 to more accurately reflect our healthy population. There may be differences in the flagging of prior results with similar values performed with this method. Interpretation of those prior results can be made in the context of the updated reference intervals.      Protein Total 7.8 6.4 - 8.3 g/dL    Albumin 4.2 3.5 - 5.2 g/dL    Bilirubin Total 0.6 <=1.2 mg/dL    GFR Estimate 61 >60 mL/min/1.73m2   TSH with free T4 reflex   Result Value Ref Range    TSH 1.30 0.30 - 4.20 uIU/mL   Albumin Random Urine Quantitative with Creat Ratio   Result Value Ref Range    Creatinine Urine mg/dL 114.0 mg/dL      Comment:      The reference ranges have not been established in urine creatinine. The results should be integrated into the clinical context for interpretation.    Albumin Urine mg/L 13.4 mg/L      Comment:      The reference ranges have not been established in urine albumin. The results should be integrated into the clinical context for interpretation.    Albumin Urine mg/g Cr 11.75 0.00 - 17.00 mg/g Cr      Comment:      Microalbuminuria is defined as an albumin:creatinine ratio of 17 to 299 for males and 25 to 299 for females. A ratio of albumin:creatinine of 300 or higher is indicative of overt proteinuria.  Due to biologic variability, positive results should be confirmed by a second, first-morning random or 24-hour timed urine specimen. If there is discrepancy, a third specimen is recommended. When 2 out of 3 results are in the microalbuminuria range, this is evidence for incipient nephropathy and warrants increased efforts at glucose control, blood pressure control, and institution of therapy with an angiotensin-converting-enzyme (ACE) inhibitor (if the patient can tolerate it).         If you have any concerns about these results please call and  leave a message for me or send a MyChart message to the clinic.    Sincerely,    Henrry Shepard MD

## 2023-09-25 ENCOUNTER — VIRTUAL VISIT (OUTPATIENT)
Dept: PSYCHOLOGY | Facility: CLINIC | Age: 73
End: 2023-09-25
Payer: COMMERCIAL

## 2023-09-25 DIAGNOSIS — F33.1 MODERATE EPISODE OF RECURRENT MAJOR DEPRESSIVE DISORDER (H): ICD-10-CM

## 2023-09-25 DIAGNOSIS — F34.1 PERSISTENT DEPRESSIVE DISORDER: Primary | ICD-10-CM

## 2023-09-25 DIAGNOSIS — F41.1 GAD (GENERALIZED ANXIETY DISORDER): ICD-10-CM

## 2023-09-25 PROCEDURE — 90834 PSYTX W PT 45 MINUTES: CPT | Mod: 95 | Performed by: PSYCHOLOGIST

## 2023-09-25 NOTE — PROGRESS NOTES
Telemedicine Visit: The patient's condition can be safely assessed and treated via audio encounter.      Reason for Telemedicine Visit: Patient has requested telehealth visit and Patient unable to travel    Originating Site (Patient Location): Patient's home    Distant Location (provider location):  On-site    Consent:  The patient/guardian has verbally consented to: the potential risks and benefits of telemedicine (video visit) versus in person care; bill my insurance or make self-payment for services provided; and responsibility for payment of non-covered services.     Mode of Communication:  Telephone call via doximity    As the provider I attest to compliance with applicable laws and regulations related to telemedicine.    Behavioral Health Progress Note    Client's Legal Name: Clarke Moreira    Client's Preferred Name: Clarke  YOB: 1950  Type of Service: telephone  Length of Service:   Start time: 8:20  End time: 9:02  Duration: 42 minutes  Attendees: patient    Identifying Information and Presenting Problem:  Clarke is a 73 year old male who is being seen for problematic symptoms of depression, low self-worth, and negative view of himself resulting from childhood trauma and other subsequent life circumstances.    Treatment Objective(s) Addressed in This Session:  Goal 1: Clarke will learn/use coping skills to continue to reduce intensity/frequency of suicidal thoughts     Goal 2: Clarke will challenge/reframe negative harsh thoughts that he has about himself.     Progress on / Status of Treatment Objective(s) / Homework:  Reports mood is about the same, no change   Was able to accept a compliment from me this session without devaluing it.    Mental Health Screening Questionnaires:  PHQ-9:       12/13/2022     9:46 AM 7/3/2023     8:38 AM 9/13/2023     9:19 AM   PHQ   PHQ-9 Total Score 18 11 24   Q9: Thoughts of better off dead/self-harm past 2 weeks Several days Not at all Nearly every  "day   F/U: Thoughts of suicide or self-harm   Yes   F/U: Self harm-plan   No   F/U: Self-harm action   No   F/U: Safety concerns   No      CHRIS-7:       12/11/2018    10:31 AM 5/10/2019     8:26 AM 9/3/2019    10:35 AM   CHRIS-7 SCORE   Total Score 20 13 19     Topics Discussed/Interventions Provided:  Session today focused on Clarke's view of himself as having \"no courage.\"  We examined what he means by courage and the story he holds about himself in relation to courage more in depth.  Clarke was asked to share some experiences where he felt as if he showed courage. He was able to come up with quite a few instances.  Also shared some of my observations of demonstrated courage from our work together in more recent years. Explored how these instances fit with his statement of having no courage.  It is hard for him to integrate this view of himself as he sees himself as not having followed through on something after he did speak up or take action.  Worked to try to create a more balanced view of this situation. Discussed the difficulties with all or nothing or black/white thinking.  Clarke reports having a difficult time incorporating this other view into his view of himself.      He did attend his appointment with Dr. Shepard recently for a check-up    Mental Status Exam:  During the visit, Clarke was open and easy to engage with.  He was alert and oriented to person, place, time, and situation. Since this is a phone visit, I'm not able to provide information on visual aspects of MSE..  Speech was his the rate, rhythm and tone typical for him. He stated that his mood is \"don't ask.\"  Affect was appropriate and mood-congruent. Thought processes were logical and coherent. Thought content contained no evidence of psychotic features and easily dismissed passive thoughts of death with no other self-directed violence related thinking Intent, urges, planning, behavior/recent attempts. Memory appeared grossly intact with no " formal evaluation. Insight is good. Judgment is good. Patient exhibited good impulse control during the appointment.     Does the patient appear to be at imminent risk of harm to self/others at this time? No    The session was necessary to address poor self-view that exacerbates depressive symptoms.  These symptoms interfere with his ability to function in areas related to interacting and relating with others, social relationships, maintaining personal health and physical well-being, day to day self care or health behaviors, participating in meaningful activities and instrumental activities of daily living (IADLs). Ongoing psychotherapy is necessary to stabilize mood, provide counseling, improve functioning with daily activities and provide support.    DSM-5-TR Diagnosis:  Persistent Depressive Disorder  Major Depression, recurrent, moderate  Generalized Anxiety Disorder    Plan:  - Follow up scheduled for 10/12  - Review treatment plan at next visit    Lety Buenrostro PsyD, JIN    NOTE: Treatment plan update due 7/25/23.  Diagnostic assessment update due 1/25/24.

## 2023-09-26 DIAGNOSIS — E78.5 HYPERLIPIDEMIA LDL GOAL <100: ICD-10-CM

## 2023-09-26 NOTE — TELEPHONE ENCOUNTER
"Last visit 9/13/23    Request for medication refill:  fish oil-omega-3 fatty acids 1000 MG capsule     Providers if patient needs an appointment and you are willing to give a one month supply please refill for one month and  send a letter/MyChart using \".SMILLIMITEDREFILL\" .smillimited and route chart to \"P SMI \" (Giving one month refill in non controlled medications is strongly recommended before denial)    If refill has been denied, meaning absolutely no refills without visit, please complete the smart phrase \".smirxrefuse\" and route it to the \"P SMI MED REFILLS\"  pool to inform the patient and the pharmacy.    Margi Noel CMA      "

## 2023-09-27 RX ORDER — PYRITHIONE ZINC 1 G/ML
1 SHAMPOO TOPICAL DAILY
Qty: 90 CAPSULE | Refills: 0 | Status: SHIPPED | OUTPATIENT
Start: 2023-09-27 | End: 2023-12-04

## 2023-10-12 ENCOUNTER — VIRTUAL VISIT (OUTPATIENT)
Dept: PSYCHOLOGY | Facility: CLINIC | Age: 73
End: 2023-10-12
Payer: COMMERCIAL

## 2023-10-12 DIAGNOSIS — F33.1 MODERATE EPISODE OF RECURRENT MAJOR DEPRESSIVE DISORDER (H): ICD-10-CM

## 2023-10-12 DIAGNOSIS — F41.1 GAD (GENERALIZED ANXIETY DISORDER): ICD-10-CM

## 2023-10-12 DIAGNOSIS — F34.1 PERSISTENT DEPRESSIVE DISORDER: Primary | ICD-10-CM

## 2023-10-12 PROCEDURE — 90834 PSYTX W PT 45 MINUTES: CPT | Mod: 95 | Performed by: PSYCHOLOGIST

## 2023-10-12 NOTE — PROGRESS NOTES
Telemedicine Visit: The patient's condition can be safely assessed and treated via audio encounter.      Reason for Telemedicine Visit: Patient has requested telehealth visit and Patient unable to travel    Originating Site (Patient Location): Patient's home    Distant Location (provider location):  On-site    Consent:  The patient/guardian has verbally consented to: the potential risks and benefits of telemedicine (video visit) versus in person care; bill my insurance or make self-payment for services provided; and responsibility for payment of non-covered services.     Mode of Communication:  Telephone call via doximity    As the provider I attest to compliance with applicable laws and regulations related to telemedicine.    Behavioral Health Progress Note    Client's Legal Name: Clarke Moreira    Client's Preferred Name: Clarke  YOB: 1950  Type of Service: telephone  Length of Service:   Start time: 8:20  End time: 9:05  Duration: 45  minutes  Attendees: patient    Identifying Information and Presenting Problem:  Clarke is a 73 year old male who is being seen for problematic symptoms of depression, low self-worth, and negative view of himself resulting from childhood trauma and other subsequent life circumstances.    Treatment Objective(s) Addressed in This Session:  Goal 1: Clarke will learn/use coping skills to continue to reduce intensity/frequency of suicidal thoughts     Goal 2: Clarke will challenge/reframe negative harsh thoughts that he has about himself.     Progress on / Status of Treatment Objective(s) / Homework:  No improvement       Mental Health Screening Questionnaires:  PHQ-9:       12/13/2022     9:46 AM 7/3/2023     8:38 AM 9/13/2023     9:19 AM   PHQ   PHQ-9 Total Score 18 11 24   Q9: Thoughts of better off dead/self-harm past 2 weeks Several days Not at all Nearly every day   F/U: Thoughts of suicide or self-harm   Yes   F/U: Self harm-plan   No   F/U: Self-harm action   " No   F/U: Safety concerns   No      CHRIS-7:       12/11/2018    10:31 AM 5/10/2019     8:26 AM 9/3/2019    10:35 AM   CHRIS-7 SCORE   Total Score 20 13 19     Topics Discussed/Interventions Provided:  Clarke expressed frustration this morning try to care for his legs states that he is an \"old neglected dog.\"  Rather than focusing on what he was able to do, Clarke was very self-disparaging on what he has not been able to do.  Has an appt next Friday with the podiatrist. He has not seen the podiatrist in over a year because he has been cancelling the appts.  Identified the series of events that typcially occurs that leads to the cancellation. Collaboratively problem solved what might help him keep the appointment next Friday.  Continued to challenge all or nothing/black and white thinking and reframe to create more balanced and helpful thoughts about the situation.     Mental Status Exam:  During the visit, Clarke was cooperative, open, and engaged.  He was alert and oriented to all spheres . Since this is a phone visit, not able to provide information on visual aspects of MSE.  Speech was his usual rate and rhythm. He stated that his mood is \"the same, sad.\"  Affect was depressed and mood-congruent. Thought processes were logical and coherent. Thought content contained no evidence of psychotic features and easily dismissed passive thoughts of death with no other self-directed violence related thinking Intent, urges, planning, behavior/recent attempts. Memory appeared grossly intact with no formal evaluation. Insight is good. Judgment is good. Patient exhibited good impulse control during the appointment.     Does the patient appear to be at imminent risk of harm to self/others at this time? No    The session was necessary to address unhelpful thoughts and depression that lead to cancelling doctor appointments.  Symptoms interfere with his ability to function in areas related to interacting and relating with others, " social relationships, maintaining personal health and physical well-being, day to day self care or health behaviors, participating in meaningful activities and instrumental activities of daily living (IADLs). Ongoing psychotherapy is necessary to stabilize mood, provide counseling, improve functioning with daily activities and provide support.    DSM-5-TR Diagnosis:  Persistent Depressive Disorder  Major Depression, recurrent, moderate  Generalized Anxiety Disorder    Plan:  - Follow up scheduled for 11/13  - Review treatment plan   - Encouraged him to attend appointment with podiatrist next week    Lety Buenrostro PsyD, LP    NOTE: Treatment plan update due 7/25/23.  Diagnostic assessment update due 1/25/24.

## 2023-10-13 ENCOUNTER — DOCUMENTATION ONLY (OUTPATIENT)
Dept: FAMILY MEDICINE | Facility: CLINIC | Age: 73
End: 2023-10-13
Payer: COMMERCIAL

## 2023-10-13 DIAGNOSIS — I50.22 CHRONIC SYSTOLIC CONGESTIVE HEART FAILURE (H): Primary | ICD-10-CM

## 2023-10-16 ENCOUNTER — DOCUMENTATION ONLY (OUTPATIENT)
Dept: FAMILY MEDICINE | Facility: CLINIC | Age: 73
End: 2023-10-16
Payer: COMMERCIAL

## 2023-10-16 NOTE — PROGRESS NOTES
10/16/23    Care Coordinator successfully faxed a DME order for an oxygen renewal to [facility] ([phone])    Ysabel Moreria  Care Coordinator

## 2023-10-27 ENCOUNTER — OFFICE VISIT (OUTPATIENT)
Dept: ORTHOPEDICS | Facility: CLINIC | Age: 73
End: 2023-10-27
Payer: COMMERCIAL

## 2023-10-27 DIAGNOSIS — B35.1 OM (ONYCHOMYCOSIS): ICD-10-CM

## 2023-10-27 DIAGNOSIS — E11.49 TYPE II OR UNSPECIFIED TYPE DIABETES MELLITUS WITH NEUROLOGICAL MANIFESTATIONS, NOT STATED AS UNCONTROLLED(250.60) (H): Primary | ICD-10-CM

## 2023-10-27 PROCEDURE — 99213 OFFICE O/P EST LOW 20 MIN: CPT | Performed by: PODIATRIST

## 2023-10-27 NOTE — PROGRESS NOTES
Chief Complaint   Patient presents with    Follow Up     Foot care.               Allergies   Allergen Reactions    Seasonal Allergies          Subjective: Clarke is a 73 year old male who presents to the clinic today for a diabetic foot exam and management.  He has no new open lesions. He sees Dr. Shepard for his diabetic care and was seen 9/13/23.    Objective  Hemoglobin A1C   Date Value Ref Range Status   07/03/2023 6.5 (H) 0.0 - 5.6 % Final     Comment:     Normal <5.7%   Prediabetes 5.7-6.4%    Diabetes 6.5% or higher     Note: Adopted from ADA consensus guidelines.   12/13/2022 6.4 (H) 0.0 - 5.6 % Final     Comment:     Normal <5.7%   Prediabetes 5.7-6.4%    Diabetes 6.5% or higher     Note: Adopted from ADA consensus guidelines.   05/09/2022 6.6 (H) 0.0 - 5.6 % Final     Comment:     Normal <5.7%   Prediabetes 5.7-6.4%    Diabetes 6.5% or higher     Note: Adopted from ADA consensus guidelines.   05/06/2021 6.2 (H) 0 - 5.6 % Final     Comment:     Normal <5.7% Prediabetes 5.7-6.4%  Diabetes 6.5% or higher - adopted from ADA   consensus guidelines.     11/09/2020 5.9 (H) 4.1 - 5.7 % Final   07/09/2020 5.9 (H) 0 - 5.6 % Final     Comment:     Normal <5.7% Prediabetes 5.7-6.4%  Diabetes 6.5% or higher - adopted from ADA   consensus guidelines.         DP and PT pulses 1/4 BL. CRT 1 second. Absent pedal hair.  Gross and protective sensations are diminished BL.  Hammertoes to all lesser digits. Hallux malleus BL. Equinus BL. Pes planus.   Onychomycosis to toes x10.  With debridement of the right medial hallux nail, there was a sanguinous bulla subungually.  No signs or symptoms of infection.  Tyloma noted to the left 5th digit DIPJ, right dorsal hallux IPJ, and right 5th DIPJ  No open lesions noted.       Assessment: Clarke is a 71 YO male with DM2 and neuropathy.  Ulceration on the left dorsal hallux. Clinically, this has healed.   Onychomycosis  Tyloma.      Plan:   - Pt seen and evaluated  - Nails debrided x  10.   - Daily foot exams.   - Pt to return to clinic in 12 weeks or sooner if problems arise.

## 2023-10-27 NOTE — LETTER
10/27/2023         RE: Clarke Moreira  [address]  [facility] [facility] [facility] [address]        Dear Colleague,    Thank you for referring your patient, Clarke Moreira, to the [facility] [place]. Please see a copy of my visit note below.    Chief Complaint   Patient presents with    Follow Up     Foot care.               Allergies   Allergen Reactions    Seasonal Allergies          Subjective: Clarke is a 73 year old male who presents to the clinic today for a diabetic foot exam and management.  He has no new open lesions. He sees Dr. Shepard for his diabetic care and was seen 9/13/23.    Objective  Hemoglobin A1C   Date Value Ref Range Status   07/03/2023 6.5 (H) 0.0 - 5.6 % Final     Comment:     Normal <5.7%   Prediabetes 5.7-6.4%    Diabetes 6.5% or higher     Note: Adopted from ADA consensus guidelines.   12/13/2022 6.4 (H) 0.0 - 5.6 % Final     Comment:     Normal <5.7%   Prediabetes 5.7-6.4%    Diabetes 6.5% or higher     Note: Adopted from ADA consensus guidelines.   05/09/2022 6.6 (H) 0.0 - 5.6 % Final     Comment:     Normal <5.7%   Prediabetes 5.7-6.4%    Diabetes 6.5% or higher     Note: Adopted from ADA consensus guidelines.   05/06/2021 6.2 (H) 0 - 5.6 % Final     Comment:     Normal <5.7% Prediabetes 5.7-6.4%  Diabetes 6.5% or higher - adopted from ADA   consensus guidelines.     11/09/2020 5.9 (H) 4.1 - 5.7 % Final   07/09/2020 5.9 (H) 0 - 5.6 % Final     Comment:     Normal <5.7% Prediabetes 5.7-6.4%  Diabetes 6.5% or higher - adopted from ADA   consensus guidelines.         DP and PT pulses 1/4 BL. CRT 1 second. Absent pedal hair.  Gross and protective sensations are diminished BL.  Hammertoes to all lesser digits. Hallux malleus BL. Equinus BL. Pes planus.   Onychomycosis to toes x10.  With debridement of the right medial hallux nail, there was a sanguinous bulla subungually.  No signs or symptoms of infection.  Tyloma noted to the left 5th digit DIPJ, right  dorsal hallux IPJ, and right 5th DIPJ  No open lesions noted.       Assessment: Clarke is a 73 YO male with DM2 and neuropathy.  Ulceration on the left dorsal hallux. Clinically, this has healed.   Onychomycosis  Tyloma.      Plan:   - Pt seen and evaluated  - Nails debrided x 10.   - Daily foot exams.   - Pt to return to clinic in 12 weeks or sooner if problems arise.           Jesús Lagos DPM

## 2023-10-28 ENCOUNTER — HEALTH MAINTENANCE LETTER (OUTPATIENT)
Age: 73
End: 2023-10-28

## 2023-11-01 ENCOUNTER — TELEPHONE (OUTPATIENT)
Dept: ANTICOAGULATION | Facility: CLINIC | Age: 73
End: 2023-11-01

## 2023-11-01 NOTE — TELEPHONE ENCOUNTER
ANTICOAGULATION     Clarke Moreira is overdue for an INR check.      Spoke with Clarke who declined to schedule a lab appointment at this time. If calling back please schedule as soon as possible.   - he cancelled INR appt today, was not feeling good.   - he will call back to coincide INR appt with [facility] visit.    Mirna Mcknight RN

## 2023-11-08 ENCOUNTER — TELEPHONE (OUTPATIENT)
Dept: ANTICOAGULATION | Facility: CLINIC | Age: 73
End: 2023-11-08
Payer: COMMERCIAL

## 2023-11-08 NOTE — TELEPHONE ENCOUNTER
ANTICOAGULATION     Clarke Moreira is overdue for an INR check.     Left message with Clarke, I schedule lab appointment on 11/13/23 (he also has another appt @ [facility])      Mirna Mcknight RN

## 2023-11-13 ENCOUNTER — VIRTUAL VISIT (OUTPATIENT)
Dept: PSYCHOLOGY | Facility: CLINIC | Age: 73
End: 2023-11-13
Payer: COMMERCIAL

## 2023-11-13 DIAGNOSIS — F41.1 GAD (GENERALIZED ANXIETY DISORDER): ICD-10-CM

## 2023-11-13 DIAGNOSIS — F33.1 MODERATE EPISODE OF RECURRENT MAJOR DEPRESSIVE DISORDER (H): ICD-10-CM

## 2023-11-13 DIAGNOSIS — F34.1 PERSISTENT DEPRESSIVE DISORDER: Primary | ICD-10-CM

## 2023-11-13 PROCEDURE — 90834 PSYTX W PT 45 MINUTES: CPT | Mod: VID | Performed by: PSYCHOLOGIST

## 2023-11-13 NOTE — PROGRESS NOTES
Telemedicine Visit: The patient's condition can be safely assessed and treated via audio encounter.      Reason for Telemedicine Visit: Patient has requested telehealth visit and Patient unable to travel    Originating Site (Patient Location): Patient's home    Distant Location (provider location):  On-site    Consent:  The patient/guardian has verbally consented to: the potential risks and benefits of telemedicine (video visit) versus in person care; bill my insurance or make self-payment for services provided; and responsibility for payment of non-covered services.     Mode of Communication:  Telephone call via doximity    As the provider I attest to compliance with applicable laws and regulations related to telemedicine.    Behavioral Health Progress Note    Client's Legal Name: Clarke Moreira    Client's Preferred Name: Clarke  YOB: 1950  Type of Service: telephone  Length of Service:   Start time: 8:22  End time: 9:07  Duration: 45  minutes  Attendees: patient    Identifying Information and Presenting Problem:  Clarke is a 73 year old male who is being seen for problematic symptoms of depression, low self-worth, and negative view of himself resulting from childhood trauma and other subsequent life circumstances.    Treatment Objective(s) Addressed in This Session:  Goal 1: Clarke will learn/use coping skills to continue to reduce intensity/frequency of suicidal thoughts     Goal 2: Clarke will challenge/reframe negative harsh thoughts that he has about himself.     Progress on / Status of Treatment Objective(s) / Homework:  No improvement       Mental Health Screening Questionnaires:  PHQ-9:       12/13/2022     9:46 AM 7/3/2023     8:38 AM 9/13/2023     9:19 AM   PHQ   PHQ-9 Total Score 18 11 24   Q9: Thoughts of better off dead/self-harm past 2 weeks Several days Not at all Nearly every day   F/U: Thoughts of suicide or self-harm   Yes   F/U: Self harm-plan   No   F/U: Self-harm action   " No   F/U: Safety concerns   No      CHRIS-7:       12/11/2018    10:31 AM 5/10/2019     8:26 AM 9/3/2019    10:35 AM   CHRIS-7 SCORE   Total Score 20 13 19     Topics Discussed/Interventions Provided:  Reports ongoing depression, disappointment in the choices he has made in life and disgust/frustration with his own struggles to make different choices.  Met his goal of going to the podiatrist. Provided positive reinforcement for this.  Cancelled cardiology appt because was feeling like it would be \"too much\" to go out.  Did go to [facility] to get his Shingles vaccine, had many positive interactions with all the people involved in this outing, but also felt very exhausted by the process of going out and walking through [place], getting the shot, etc.  Main focus today is on sleep.  Discussed sleep hygiene and stimulus control strategies.  Worked on developing a SMART goal.  Clarke's goal is to not lay in his bed (which is in a corner of the living room) from 5-7 pm to try to keep himself up a little later so he doesn't wake up so early.  Discussed focusing on just today as he was getting overwhelmed about trying to do this on an ongoing basis.       Mental Status Exam:  During the visit, Clarke was  actively engaged, open, and thoughtful .  He was alert and oriented to person, place, time, and situation. Since this is a phone visit, not able to provide information on visual aspects of MSE.  Speech was Clarke's typical rate and rhythm. He stated that he is depressed.  Affect was depressed and mood-congruent. Thought processes were logical and coherent. Thought content contained no evidence of psychotic features and easily dismissed passive thoughts of death with no other self-directed violence related thinking Intent, urges, planning, behavior/recent attempts. Memory appeared grossly intact with no formal evaluation. Insight is good. Judgment is good. Patient exhibited good impulse control during the appointment.     Does the " patient appear to be at imminent risk of harm to self/others at this time? No    The session was necessary to address concerns around sleep.  Mood symptoms and sleep problems interfere with his ability to function in areas related to interacting and relating with others, social relationships, maintaining personal health and physical well-being, day to day self care or health behaviors, participating in meaningful activities and instrumental activities of daily living (IADLs). Ongoing psychotherapy is necessary to stabilize mood, provide counseling, improve functioning with daily activities and provide support.    DSM-5-TR Diagnosis:  Persistent Depressive Disorder  Major Depression, recurrent, moderate  Generalized Anxiety Disorder    Plan:  - Follow up scheduled for 11/30 at 8:20am  - Review treatment plan   -Discussed Dr. Shepard's appt and Cardiology appts that are coming up, encouraged to attend    Lety Buenrostro PsyD, LP    NOTE: Treatment plan update due 7/25/23.  Diagnostic assessment update due 1/25/24.

## 2023-12-04 DIAGNOSIS — E78.5 HYPERLIPIDEMIA LDL GOAL <100: ICD-10-CM

## 2023-12-04 RX ORDER — CHLORAL HYDRATE 500 MG
1 CAPSULE ORAL DAILY
Qty: 90 CAPSULE | Refills: 0 | Status: SHIPPED | OUTPATIENT
Start: 2023-12-04 | End: 2024-02-19

## 2023-12-04 NOTE — TELEPHONE ENCOUNTER
"Request for medication refill:  Omega-3 Fatty Acids (EQL FISH OIL) 1000 MG CAPS     Providers if patient needs an appointment and you are willing to give a one month supply please refill for one month and  send a letter/MyChart using \".SMILLIMITEDREFILL\" .smillimited and route chart to \"P SMI \" (Giving one month refill in non controlled medications is strongly recommended before denial)    If refill has been denied, meaning absolutely no refills without visit, please complete the smart phrase \".smirxrefuse\" and route it to the \"P SMI MED REFILLS\"  pool to inform the patient and the pharmacy.    Margi Noel, CMA      "

## 2023-12-08 ENCOUNTER — DOCUMENTATION ONLY (OUTPATIENT)
Dept: ANTICOAGULATION | Facility: CLINIC | Age: 73
End: 2023-12-08
Payer: COMMERCIAL

## 2023-12-08 NOTE — LETTER
[facility]  [address]  [place] [facility] [address]  Phone: [phone]  Fax: [phone]   December 8, 2023        Clarke Moreira  [address]  [place] [place] [facility] [address]            Dear Clarke,    You are currently under the care of [facility] for your warfarin (Coumadin , Jantoven ) therapy.  We are contacting you because our records show you were due for an INR on 10/25/23.    Last INR on 9/13/23.    There are potentially serious risks when taking warfarin without careful monitoring and we want to make sure you are safely managed.  Routine lab monitoring is required for warfarin refills.     Please call [phone] as soon as possible to schedule a lab appointment. If it is difficult for you to get to lab, please call us to discuss options.  If there has been a change in your care or other concerns, please let us know so we can help and/or update our records.         Sincerely,       [facility]

## 2023-12-08 NOTE — PROGRESS NOTES
ANTICOAGULATION     Clarke Moreira is overdue for an INR check.     Reminder letter sent    Mirna Mcknight RN

## 2024-01-04 ENCOUNTER — DOCUMENTATION ONLY (OUTPATIENT)
Dept: ANTICOAGULATION | Facility: CLINIC | Age: 74
End: 2024-01-04
Payer: COMMERCIAL

## 2024-01-04 NOTE — LETTER
[facility]  [address]  [place] [address]  Phone: [phone]  Fax: [phone]   January 4, 2024        Clarke Moreira  [address]  [place] [facility] [address]            Dear Clarke,    You are currently under the care of [facility] for your warfarin (Coumadin , Jantoven ) therapy.  We are contacting you because our records show you were due for an INR on 10/25/23.    Last INR was 3 months ago in 9/13/23.    There are potentially serious risks when taking warfarin without careful monitoring and we want to make sure you are safely managed.  Routine lab monitoring is required for warfarin refills.     Please call [phone] as soon as possible to schedule a lab appointment. If it is difficult for you to get to lab, please call us to discuss options.  If there has been a change in your care or other concerns, please let us know so we can help and/or update our records.         Sincerely,       [facility]

## 2024-01-04 NOTE — PROGRESS NOTES
Anticoagulation Clinic Notification    Clarke, is past due for an INR. Their last result was 2.27 on 9/13/23 and was due to come back on 10/25/23.    he received phone calls and letters over the last several weeks in attempt to arrange follow up labs. Clarke Moreira will be contacted again today.     Please contact patient directly to discuss compliance with monitoring or schedule visit to review ongoing anticoagulation therapy.    Thank you,     [facility]

## 2024-01-17 ENCOUNTER — LAB (OUTPATIENT)
Dept: LAB | Facility: CLINIC | Age: 74
End: 2024-01-17
Attending: INTERNAL MEDICINE
Payer: COMMERCIAL

## 2024-01-17 ENCOUNTER — OFFICE VISIT (OUTPATIENT)
Dept: ORTHOPEDICS | Facility: CLINIC | Age: 74
End: 2024-01-17
Payer: COMMERCIAL

## 2024-01-17 ENCOUNTER — ANTICOAGULATION THERAPY VISIT (OUTPATIENT)
Dept: ANTICOAGULATION | Facility: CLINIC | Age: 74
End: 2024-01-17

## 2024-01-17 DIAGNOSIS — I50.30 (HFPEF) HEART FAILURE WITH PRESERVED EJECTION FRACTION (H): ICD-10-CM

## 2024-01-17 DIAGNOSIS — I48.0 PAROXYSMAL ATRIAL FIBRILLATION (H): ICD-10-CM

## 2024-01-17 DIAGNOSIS — Z79.01 LONG TERM (CURRENT) USE OF ANTICOAGULANTS: ICD-10-CM

## 2024-01-17 DIAGNOSIS — B35.1 OM (ONYCHOMYCOSIS): ICD-10-CM

## 2024-01-17 DIAGNOSIS — E11.49 TYPE II OR UNSPECIFIED TYPE DIABETES MELLITUS WITH NEUROLOGICAL MANIFESTATIONS, NOT STATED AS UNCONTROLLED(250.60) (H): Primary | ICD-10-CM

## 2024-01-17 DIAGNOSIS — I48.20 CHRONIC ATRIAL FIBRILLATION (H): ICD-10-CM

## 2024-01-17 DIAGNOSIS — I48.20 CHRONIC ATRIAL FIBRILLATION (H): Primary | ICD-10-CM

## 2024-01-17 LAB
ALBUMIN SERPL BCG-MCNC: 4.2 G/DL (ref 3.5–5.2)
ALP SERPL-CCNC: 97 U/L (ref 40–150)
ALT SERPL W P-5'-P-CCNC: 10 U/L (ref 0–70)
ANION GAP SERPL CALCULATED.3IONS-SCNC: 12 MMOL/L (ref 7–15)
AST SERPL W P-5'-P-CCNC: 21 U/L (ref 0–45)
BILIRUB SERPL-MCNC: 0.7 MG/DL
BUN SERPL-MCNC: 14.1 MG/DL (ref 8–23)
CALCIUM SERPL-MCNC: 9.2 MG/DL (ref 8.8–10.2)
CHLORIDE SERPL-SCNC: 99 MMOL/L (ref 98–107)
CREAT SERPL-MCNC: 1.08 MG/DL (ref 0.67–1.17)
DEPRECATED HCO3 PLAS-SCNC: 27 MMOL/L (ref 22–29)
EGFRCR SERPLBLD CKD-EPI 2021: 72 ML/MIN/1.73M2
ERYTHROCYTE [DISTWIDTH] IN BLOOD BY AUTOMATED COUNT: 14.1 % (ref 10–15)
GLUCOSE SERPL-MCNC: 118 MG/DL (ref 70–99)
HCT VFR BLD AUTO: 46.1 % (ref 40–53)
HGB BLD-MCNC: 15 G/DL (ref 13.3–17.7)
INR BLD: 2.3 (ref 0.9–1.1)
MCH RBC QN AUTO: 28.4 PG (ref 26.5–33)
MCHC RBC AUTO-ENTMCNC: 32.5 G/DL (ref 31.5–36.5)
MCV RBC AUTO: 87 FL (ref 78–100)
NT-PROBNP SERPL-MCNC: 629 PG/ML (ref 0–900)
PLATELET # BLD AUTO: 250 10E3/UL (ref 150–450)
POTASSIUM SERPL-SCNC: 4.1 MMOL/L (ref 3.4–5.3)
PROT SERPL-MCNC: 7.8 G/DL (ref 6.4–8.3)
RBC # BLD AUTO: 5.29 10E6/UL (ref 4.4–5.9)
SODIUM SERPL-SCNC: 138 MMOL/L (ref 135–145)
WBC # BLD AUTO: 9.5 10E3/UL (ref 4–11)

## 2024-01-17 PROCEDURE — 80053 COMPREHEN METABOLIC PANEL: CPT | Performed by: PATHOLOGY

## 2024-01-17 PROCEDURE — 83880 ASSAY OF NATRIURETIC PEPTIDE: CPT | Performed by: PATHOLOGY

## 2024-01-17 PROCEDURE — 36415 COLL VENOUS BLD VENIPUNCTURE: CPT | Performed by: PATHOLOGY

## 2024-01-17 PROCEDURE — 11721 DEBRIDE NAIL 6 OR MORE: CPT | Performed by: PODIATRIST

## 2024-01-17 PROCEDURE — 99213 OFFICE O/P EST LOW 20 MIN: CPT | Mod: 25 | Performed by: PODIATRIST

## 2024-01-17 PROCEDURE — 85027 COMPLETE CBC AUTOMATED: CPT | Performed by: PATHOLOGY

## 2024-01-17 PROCEDURE — 85610 PROTHROMBIN TIME: CPT | Performed by: PATHOLOGY

## 2024-01-17 NOTE — LETTER
1/17/2024         RE: Clarke Moreira  [address]  [place] [address]        Dear Colleague,    Thank you for referring your patient, Clarke Moreira, to the [facility] [place]. Please see a copy of my visit note below.    Chief Complaint   Patient presents with    Follow Up     Foot/leg check.               Allergies   Allergen Reactions    Seasonal Allergies          Subjective: Clarke is a 73 year old male who presents to the clinic today for a diabetic foot exam and management.  He has no new open lesions. He sees Dr. Shepard for his diabetic care and was seen 9/13/23.    Objective  Hemoglobin A1C   Date Value Ref Range Status   07/03/2023 6.5 (H) 0.0 - 5.6 % Final     Comment:     Normal <5.7%   Prediabetes 5.7-6.4%    Diabetes 6.5% or higher     Note: Adopted from ADA consensus guidelines.   12/13/2022 6.4 (H) 0.0 - 5.6 % Final     Comment:     Normal <5.7%   Prediabetes 5.7-6.4%    Diabetes 6.5% or higher     Note: Adopted from ADA consensus guidelines.   05/09/2022 6.6 (H) 0.0 - 5.6 % Final     Comment:     Normal <5.7%   Prediabetes 5.7-6.4%    Diabetes 6.5% or higher     Note: Adopted from ADA consensus guidelines.   05/06/2021 6.2 (H) 0 - 5.6 % Final     Comment:     Normal <5.7% Prediabetes 5.7-6.4%  Diabetes 6.5% or higher - adopted from ADA   consensus guidelines.     11/09/2020 5.9 (H) 4.1 - 5.7 % Final   07/09/2020 5.9 (H) 0 - 5.6 % Final     Comment:     Normal <5.7% Prediabetes 5.7-6.4%  Diabetes 6.5% or higher - adopted from ADA   consensus guidelines.         DP and PT pulses 1/4 BL. CRT 1 second. Absent pedal hair.  Gross and protective sensations are diminished BL.  Hammertoes to all lesser digits. Hallux malleus BL. Equinus BL. Pes planus.   Onychomycosis to toes x10.  With debridement of the right medial hallux nail, there was a sanguinous bulla subungually.  No signs or symptoms of infection.  Tyloma noted to the left 5th digit DIPJ, right  dorsal hallux IPJ, and right 5th DIPJ  No open lesions noted.       Assessment: Clarke is a 71 YO male with DM2 and neuropathy.  Ulceration on the left dorsal hallux. Clinically, this has healed.   Onychomycosis  Tyloma.      Plan:   - Pt seen and evaluated  - Nails debrided x 10.   - Daily foot exams.   - Pt to return to clinic in 12 weeks or sooner if problems arise.         Jesús Lagos DPM

## 2024-01-17 NOTE — PROGRESS NOTES
Chief Complaint   Patient presents with    Follow Up     Foot/leg check.               Allergies   Allergen Reactions    Seasonal Allergies          Subjective: Clarke is a 73 year old male who presents to the clinic today for a diabetic foot exam and management.  He has no new open lesions. He sees Dr. Shepard for his diabetic care and was seen 9/13/23.    Objective  Hemoglobin A1C   Date Value Ref Range Status   07/03/2023 6.5 (H) 0.0 - 5.6 % Final     Comment:     Normal <5.7%   Prediabetes 5.7-6.4%    Diabetes 6.5% or higher     Note: Adopted from ADA consensus guidelines.   12/13/2022 6.4 (H) 0.0 - 5.6 % Final     Comment:     Normal <5.7%   Prediabetes 5.7-6.4%    Diabetes 6.5% or higher     Note: Adopted from ADA consensus guidelines.   05/09/2022 6.6 (H) 0.0 - 5.6 % Final     Comment:     Normal <5.7%   Prediabetes 5.7-6.4%    Diabetes 6.5% or higher     Note: Adopted from ADA consensus guidelines.   05/06/2021 6.2 (H) 0 - 5.6 % Final     Comment:     Normal <5.7% Prediabetes 5.7-6.4%  Diabetes 6.5% or higher - adopted from ADA   consensus guidelines.     11/09/2020 5.9 (H) 4.1 - 5.7 % Final   07/09/2020 5.9 (H) 0 - 5.6 % Final     Comment:     Normal <5.7% Prediabetes 5.7-6.4%  Diabetes 6.5% or higher - adopted from ADA   consensus guidelines.         DP and PT pulses 1/4 BL. CRT 1 second. Absent pedal hair.  Gross and protective sensations are diminished BL.  Hammertoes to all lesser digits. Hallux malleus BL. Equinus BL. Pes planus.   Onychomycosis to toes x10.  With debridement of the right medial hallux nail, there was a sanguinous bulla subungually.  No signs or symptoms of infection.  Tyloma noted to the left 5th digit DIPJ, right dorsal hallux IPJ, and right 5th DIPJ  No open lesions noted.       Assessment: Clarke is a 73 YO male with DM2 and neuropathy.  Ulceration on the left dorsal hallux. Clinically, this has healed.   Onychomycosis  Tyloma.      Plan:   - Pt seen and evaluated  - Nails debrided  x 10.   - Daily foot exams.   - Pt to return to clinic in 12 weeks or sooner if problems arise.

## 2024-01-17 NOTE — PROGRESS NOTES
ANTICOAGULATION MANAGEMENT     Clarke Moreira 73 year old male is on warfarin with therapeutic INR result. (Goal INR 2.0-3.0)    Recent labs: (last 7 days)     01/17/24  0926   INR 2.3*       ASSESSMENT     Source(s): Chart Reviewed     Medication/supplement changes: None noted  New illness, injury, or hospitalization: None noted.  Previous result: Therapeutic last 6 INR visits  Additional findings: None       PLAN     Recommended plan for no diet, medication or health factor changes affecting INR     Dosing Instructions: Continue your current warfarin dose with next INR in 7 weeks       Summary  As of 1/17/2024      Full warfarin instructions:  5 mg every Thu; 10 mg all other days   Next INR check:  3/6/2024               Detailed voice message left for Clarke with dosing instructions and follow up date.     Contact [phone] to schedule and with any changes, questions or concerns.     Education provided:   Advise a call back if any changes to your diet, medications or how you've been taking warfarin  Taking warfarin: Importance of taking warfarin as instructed  Goal range and lab monitoring: goal range and significance of current result  Dietary considerations: importance of consistent vitamin K intake  Symptom monitoring: monitoring for bleeding signs and symptoms    Plan made per [facility] anticoagulation protocol    Mirna Mcknight RN  Anticoagulation Clinic  1/17/2024    _______________________________________________________________________     Anticoagulation Episode Summary       Current INR goal:  2.0-3.0   TTR:  100.0% (2.4 mo)   Target end date:  Indefinite   Send INR reminders to:  [facility]    Indications    Chronic atrial fibrillation (H) [I48.20]  Long term (current) use of anticoagulants [Z79.01]  Paroxysmal atrial fibrillation (H) [I48.0]             Comments:  Home Care             Anticoagulation Care Providers       Provider Role Specialty Phone number    Matthias Sanchez MD  [facility] [phone]

## 2024-01-26 DIAGNOSIS — I50.32 CHRONIC HEART FAILURE WITH PRESERVED EJECTION FRACTION (H): ICD-10-CM

## 2024-01-26 RX ORDER — POTASSIUM CHLORIDE 1500 MG/1
40 TABLET, EXTENDED RELEASE ORAL 2 TIMES DAILY
Qty: 120 TABLET | Refills: 0 | Status: SHIPPED | OUTPATIENT
Start: 2024-01-26 | End: 2024-02-19

## 2024-01-26 NOTE — TELEPHONE ENCOUNTER
"Request for medication refill:  potassium chloride ER (KLOR-CON) 20 MEQ CR tablet     Providers if patient needs an appointment and you are willing to give a one month supply please refill for one month and  send a letter/MyChart using \".SMILLIMITEDREFILL\" .smillimited and route chart to \"P [facility] \" (Giving one month refill in non controlled medications is strongly recommended before denial)    If refill has been denied, meaning absolutely no refills without visit, please complete the smart phrase \".smirxrefuse\" and route it to the \"P [facility] MED REFILLS\"  pool to inform the patient and the pharmacy.    Margi Noel, CMA      "

## 2024-02-12 ENCOUNTER — DOCUMENTATION ONLY (OUTPATIENT)
Dept: ANTICOAGULATION | Facility: CLINIC | Age: 74
End: 2024-02-12
Payer: COMMERCIAL

## 2024-02-12 DIAGNOSIS — I48.20 CHRONIC ATRIAL FIBRILLATION (H): Primary | ICD-10-CM

## 2024-02-12 DIAGNOSIS — I48.0 PAROXYSMAL ATRIAL FIBRILLATION (H): ICD-10-CM

## 2024-02-12 DIAGNOSIS — Z79.01 LONG TERM (CURRENT) USE OF ANTICOAGULANTS: ICD-10-CM

## 2024-02-12 NOTE — PROGRESS NOTES
ANTICOAGULATION CLINIC REFERRAL RENEWAL REQUEST       An annual renewal order is required for all patients referred to [facility].?  Please review and sign the pended referral order for Clarke Moreira.       ANTICOAGULATION SUMMARY      Warfarin indication(s)   Atrial Fibrillation, chronic    Mechanical heart valve present?  NO       Current goal range   INR: 2.0-3.0     Goal appropriate for indication? Goal INR 2-3, standard for indication(s) above     Time in Therapeutic Range (TTR)  (Goal > 60%) 100.0 %       Office visit with referring provider's group within last year Yes on 9/13/2023       Mirna Mcknight RN  [facility]

## 2024-02-17 DIAGNOSIS — E78.5 HYPERLIPIDEMIA LDL GOAL <100: ICD-10-CM

## 2024-02-17 DIAGNOSIS — I50.32 CHRONIC HEART FAILURE WITH PRESERVED EJECTION FRACTION (H): ICD-10-CM

## 2024-02-17 DIAGNOSIS — K59.00 CONSTIPATION, UNSPECIFIED CONSTIPATION TYPE: ICD-10-CM

## 2024-02-17 DIAGNOSIS — E11.9 TYPE 2 DIABETES MELLITUS WITHOUT COMPLICATION, WITHOUT LONG-TERM CURRENT USE OF INSULIN (H): ICD-10-CM

## 2024-02-18 DIAGNOSIS — I50.32 CHRONIC HEART FAILURE WITH PRESERVED EJECTION FRACTION (H): ICD-10-CM

## 2024-02-19 RX ORDER — POTASSIUM CHLORIDE 1500 MG/1
40 TABLET, EXTENDED RELEASE ORAL 2 TIMES DAILY
Qty: 120 TABLET | Refills: 0 | OUTPATIENT
Start: 2024-02-19

## 2024-02-19 RX ORDER — PYRITHIONE ZINC 1 G/ML
1 SHAMPOO TOPICAL DAILY
Qty: 90 CAPSULE | Refills: 0 | Status: SHIPPED | OUTPATIENT
Start: 2024-02-19 | End: 2024-03-15

## 2024-02-19 RX ORDER — ATORVASTATIN CALCIUM 40 MG/1
40 TABLET, FILM COATED ORAL DAILY
Qty: 90 TABLET | Refills: 0 | Status: SHIPPED | OUTPATIENT
Start: 2024-02-19 | End: 2024-03-15

## 2024-02-19 RX ORDER — DOCUSATE SODIUM 50MG AND SENNOSIDES 8.6MG 8.6; 5 MG/1; MG/1
TABLET, FILM COATED ORAL
Qty: 180 TABLET | Refills: 0 | Status: SHIPPED | OUTPATIENT
Start: 2024-02-19 | End: 2024-03-15

## 2024-02-19 RX ORDER — POTASSIUM CHLORIDE 1500 MG/1
40 TABLET, EXTENDED RELEASE ORAL 2 TIMES DAILY
Qty: 120 TABLET | Refills: 0 | Status: SHIPPED | OUTPATIENT
Start: 2024-02-19 | End: 2024-03-15

## 2024-02-19 NOTE — TELEPHONE ENCOUNTER
"Request for medication refill:  atorvastatin (LIPITOR) 40 MG tablet     SM STOOL SOFTENER/LAXATIVE 8.6-50 MG tablet     Providers if patient needs an appointment and you are willing to give a one month supply please refill for one month and  send a letter/MyChart using \".SMILLIMITEDREFILL\" .smillimited and route chart to \"P [facility] \" (Giving one month refill in non controlled medications is strongly recommended before denial)    If refill has been denied, meaning absolutely no refills without visit, please complete the smart phrase \".smirxrefuse\" and route it to the \"P [facility] MED REFILLS\"  pool to inform the patient and the pharmacy.    Margi Noel, CMA      "

## 2024-02-19 NOTE — TELEPHONE ENCOUNTER

## 2024-02-21 DIAGNOSIS — K59.00 CONSTIPATION, UNSPECIFIED CONSTIPATION TYPE: ICD-10-CM

## 2024-02-22 ENCOUNTER — VIRTUAL VISIT (OUTPATIENT)
Dept: PSYCHOLOGY | Facility: CLINIC | Age: 74
End: 2024-02-22
Payer: COMMERCIAL

## 2024-02-22 DIAGNOSIS — F41.1 GAD (GENERALIZED ANXIETY DISORDER): ICD-10-CM

## 2024-02-22 DIAGNOSIS — F34.1 PERSISTENT DEPRESSIVE DISORDER: Primary | ICD-10-CM

## 2024-02-22 DIAGNOSIS — F33.1 MODERATE EPISODE OF RECURRENT MAJOR DEPRESSIVE DISORDER (H): ICD-10-CM

## 2024-02-22 PROCEDURE — 90834 PSYTX W PT 45 MINUTES: CPT | Mod: 93 | Performed by: PSYCHOLOGIST

## 2024-02-22 NOTE — PROGRESS NOTES
Telemedicine Visit: The patient's condition can be safely assessed and treated via audio encounter.      Reason for Telemedicine Visit: Patient has requested telehealth visit and Patient unable to travel    Originating Site (Patient Location): Patient's home    Distant Location (provider location):  On-site    Consent:  The patient/guardian has verbally consented to: the potential risks and benefits of telemedicine (video visit) versus in person care; bill my insurance or make self-payment for services provided; and responsibility for payment of non-covered services.     Mode of Communication:  Telephone call via doximity    As the provider I attest to compliance with applicable laws and regulations related to telemedicine.    Behavioral Health Progress Note    Client's Legal Name: Clarke Moreira    Client's Preferred Name: Clarke  YOB: 1950  Type of Service: telephone  Length of Service:   Start time: 9:16  End time: 10:05  Duration: 49 minutes  Attendees: patient    Identifying Information and Presenting Problem:  Clarke is a 73 year old male who is being seen for problematic symptoms of depression, low self-worth, and negative view of himself resulting from childhood trauma and other subsequent life circumstances.    Treatment Objective(s) Addressed in This Session:  Goal 1: Clarke will learn/use coping skills to continue to reduce intensity/frequency of suicidal thoughts     Goal 2: Clakre will challenge/reframe negative harsh thoughts that he has about himself.     Progress on / Status of Treatment Objective(s) / Homework:  Minimal progress       Mental Health Screening Questionnaires:  PHQ-9:       12/13/2022     9:46 AM 7/3/2023     8:38 AM 9/13/2023     9:19 AM   PHQ   PHQ-9 Total Score 18 11 24   Q9: Thoughts of better off dead/self-harm past 2 weeks Several days Not at all Nearly every day   F/U: Thoughts of suicide or self-harm   Yes   F/U: Self harm-plan   No   F/U: Self-harm  "action   No   F/U: Safety concerns   No      CHRIS-7:       12/11/2018    10:31 AM 5/10/2019     8:26 AM 9/3/2019    10:35 AM   CHRIS-7 SCORE   Total Score 20 13 19     Topics Discussed/Interventions Provided:    Subjective: \"I have just been bereft\"    Sxs: dysphoric, depressed, increased suicidal thoughts, wondering what would be the point of going on, part of him wishes life would just end, denies any plan or intent to do something to harm himself.  Feeling weary, hopeless at times.    Stressors: isolation, apartment is not clean, rumination about wanting and not wanting to go to medical appointments and the consequences of either choice    Coping: thinking of failures, things I've done wrong - tell myself stop, cancelling appointments - haven't been going, the increased suicidal thoughts have also been causing Clarke to want to find out more about other options that might be available to assist with very difficult to treat depression.  Feels ambivalent about this - not sure if anything really can help as he feels like he has spent his whole life in about the same place.    Relevant medical hx (per pt and/or chart review): cancelled a number of medical appointments. Planning to attend appt with Dr. Shepard in March.     Interventions:  Assessment of suicidal thoughts, motivational interviewing, challenge/reframe thoughts, behavioral activation, psychoeducation on different types of more intensive treatments as Clarke asked about psychadelics as a treatment.  Provided information on TMS and Ketamine. Discussed interventional psychiatry program at [facility].  Clarke identified that he was in a men's group many years ago.  We talked about the groups at [facility] and provided this information to Clarke - he wrote it down while we were talking.     Mental Status Exam:  During the visit, Clarke was open, easy to engage with, and respectful.  He was alert and oriented to all spheres . Since this is a phone " visit, not able to provide information on visual aspects of MSE.  Speech was his usual rate and rhythm. Reports his mood continues to be depressed.  Affect was depressed and mood-congruent. Thought processes were logical and coherent. Thought content contained no evidence of psychotic features and easily dismissed passive thoughts of death with no other self-directed violence related thinking Intent, urges, planning, behavior/recent attempts. Memory appeared grossly intact with no formal evaluation. Insight is good. Judgment is good. Patient exhibited good impulse control during the appointment.     Does the patient appear to be at imminent risk of harm to self/others at this time? No    The session was necessary to address chronic suicidal thoughts and discuss more intensive options available to treat depression.  Symptoms of depression interfere with his ability to function in areas related to interacting and relating with others, social relationships, maintaining personal health and physical well-being, day to day self care or health behaviors, participating in meaningful activities and instrumental activities of daily living (IADLs). Ongoing psychotherapy is necessary to stabilize mood, provide counseling, improve functioning with daily activities and provide support.    DSM-5-TR Diagnosis:  Persistent Depressive Disorder  Major Depression, recurrent, moderate  Generalized Anxiety Disorder    Plan:  - Follow up scheduled for 3/14  - Seeing Dr. Shepard on 3/15  - Encouraged him to reschedule cardiology appt  - Provided information on groups at the [facility], as well as TMS and ketamine.  - Update diagnostic assessment and review treatment plan  - Review treatment plan     Lety Buenrostro PsyD, JIN    NOTE: Treatment plan update due 7/25/23.  Diagnostic assessment update due 1/25/24.

## 2024-02-28 RX ORDER — DOCUSATE SODIUM 50MG AND SENNOSIDES 8.6MG 8.6; 5 MG/1; MG/1
TABLET, FILM COATED ORAL
Qty: 180 TABLET | Refills: 0 | OUTPATIENT
Start: 2024-02-28

## 2024-03-04 NOTE — TELEPHONE ENCOUNTER
FUTURE VISIT INFORMATION      FUTURE VISIT INFORMATION:  Date: 4/4/24  Time: 8:20am  Location: [facility]  REFERRAL INFORMATION:  Referring provider:  Henrry Shepard MD   Referring providers clinic:  [facility]  Reason for visit/diagnosis  Diabetic eye exam     RECORDS REQUESTED FROM:       Clinic name Comments Records Status Imaging Status   [facility] Ov/referral 9/13/23 [facility]    [facility] 12/2/21 [facility]

## 2024-03-13 DIAGNOSIS — E11.65 TYPE 2 DIABETES MELLITUS WITH HYPERGLYCEMIA, WITHOUT LONG-TERM CURRENT USE OF INSULIN (H): Primary | ICD-10-CM

## 2024-03-14 ENCOUNTER — VIRTUAL VISIT (OUTPATIENT)
Dept: PSYCHOLOGY | Facility: CLINIC | Age: 74
End: 2024-03-14
Payer: COMMERCIAL

## 2024-03-14 DIAGNOSIS — F41.1 GAD (GENERALIZED ANXIETY DISORDER): ICD-10-CM

## 2024-03-14 DIAGNOSIS — F34.1 PERSISTENT DEPRESSIVE DISORDER: Primary | ICD-10-CM

## 2024-03-14 DIAGNOSIS — F33.1 MODERATE EPISODE OF RECURRENT MAJOR DEPRESSIVE DISORDER (H): ICD-10-CM

## 2024-03-14 PROCEDURE — 90834 PSYTX W PT 45 MINUTES: CPT | Mod: 93 | Performed by: PSYCHOLOGIST

## 2024-03-14 NOTE — PROGRESS NOTES
Telemedicine Visit: The patient's condition can be safely assessed and treated via audio encounter.      Reason for Telemedicine Visit: Patient has requested telehealth visit and Patient unable to travel    Originating Site (Patient Location): Patient's home    Distant Location (provider location):  On-site    Consent:  The patient/guardian has verbally consented to: the potential risks and benefits of telemedicine (video visit) versus in person care; bill my insurance or make self-payment for services provided; and responsibility for payment of non-covered services.     Mode of Communication:  Telephone call via doximity    As the provider I attest to compliance with applicable laws and regulations related to telemedicine.    Behavioral Health Progress Note    Client's Legal Name: Clarke Moreira    Client's Preferred Name: Clarke  YOB: 1950  Type of Service: telephone  Length of Service:   Start time: 8:20  End time: 9:02  Duration: 42 minutes  Attendees: patient    Identifying Information and Presenting Problem:  Clarke is a 73 year old male who is being seen for problematic symptoms of depression, low self-worth, and negative view of himself resulting from childhood trauma and other subsequent life circumstances.    Treatment Objective(s) Addressed in This Session:  Goal 1: Clarke will learn/use coping skills to continue to reduce intensity/frequency of suicidal thoughts     Goal 2: Clarke will challenge/reframe negative harsh thoughts that he has about himself.     Progress on / Status of Treatment Objective(s) / Homework:  Minimal progress       Mental Health Screening Questionnaires:  PHQ-9:       12/13/2022     9:46 AM 7/3/2023     8:38 AM 9/13/2023     9:19 AM   PHQ   PHQ-9 Total Score 18 11 24   Q9: Thoughts of better off dead/self-harm past 2 weeks Several days Not at all Nearly every day   F/U: Thoughts of suicide or self-harm   Yes   F/U: Self harm-plan   No   F/U: Self-harm action  "  No   F/U: Safety concerns   No      CHRIS-7:       2018    10:31 AM 5/10/2019     8:26 AM 9/3/2019    10:35 AM   CHRIS-7 SCORE   Total Score 20 13 19     Topics Discussed/Interventions Provided:    \"I'm frightened, I'm ambivalent, I'm relieved, and I'm angry\"    Subjective: Clarke reports everything is about the same. No changes. Has been watching the [place] legislature session. Much frustration about how some of them block and shame conversations from happening.  Following legislation about  rights and right to death.     Sxs: sad, depressed, feels stuck, like a failure    Stressors: difficulties attending medical appointments, limited interaction with other people, cleanliness of his apartment, feelings of failure about his life.    Coping: Finds pleasure in watching the legislature proceedings - particularly a group of female  that are very effective and powerful.  They give him hope.  When he is having negative thoughts, makes efforts to try to reframe these thoughts, looked into some possible groups in the community, as well as reading more information about TMS and ketamine.     Relevant medical hx (per pt and/or chart review): Has an appt with Dr. Shepard tomorrow. Clarke states that after the visit today, he feels a little more settled and like he can attend it.    Interventions:  Reviewed actions on recommendations from last time. Clarke had forgotten about the [facility] group, so provided him with this info and he is going to look into this more. Discussed ways he can access interventional psychiatry program. Would like time to think about this and talk more at next visit.  Motivational interviewing, challenge/reframe thoughts, behavioral activation    Mental Status Exam:  During the visit, Clarke was open, easy to engage with, and pleasant.  He was alert and oriented to person, place, time, and situation. Since this is a phone visit, not able to provide information on visual " aspects of MSE.  Speech was a bit more rapid today, but not pressured.  Mood continues to feel down and sad.  Affect was appropriate and mood-congruent, he sounded a bit more energized than our last visit.  Thought processes were logical and coherent. Thought content contained no evidence of psychotic features and reports some passive suicidal ideation, but denies any plan or intent to harm himself . Memory appeared grossly intact with no formal evaluation. Insight is good. Judgment is good. Patient exhibited good impulse control during the appointment.     Does the patient appear to be at imminent risk of harm to self/others at this time? No    The session was necessary to address chronic suicidal thoughts and depressive symptoms.  Symptoms of depression interfere with his ability to function in areas related to interacting and relating with others, social relationships, maintaining personal health and physical well-being, day to day self care or health behaviors, participating in meaningful activities and instrumental activities of daily living (IADLs). Ongoing psychotherapy is necessary to stabilize mood, provide counseling, improve functioning with daily activities and provide support.    DSM-5-TR Diagnosis:  Persistent Depressive Disorder  Major Depression, recurrent, moderate  Generalized Anxiety Disorder    Plan:  - Follow up scheduled for 4/1  - Seeing Dr. Shepard on 3/15  - He will look [facility] on [facility]  - Update diagnostic assessment   - Review treatment plan     Lety Buenrostro PsyD, LP    NOTE: Treatment plan update due 7/25/23.  Diagnostic assessment update due 1/25/24.

## 2024-03-15 ENCOUNTER — OFFICE VISIT (OUTPATIENT)
Dept: FAMILY MEDICINE | Facility: CLINIC | Age: 74
End: 2024-03-15
Payer: COMMERCIAL

## 2024-03-15 ENCOUNTER — ANTICOAGULATION THERAPY VISIT (OUTPATIENT)
Dept: ANTICOAGULATION | Facility: CLINIC | Age: 74
End: 2024-03-15

## 2024-03-15 VITALS
RESPIRATION RATE: 22 BRPM | SYSTOLIC BLOOD PRESSURE: 122 MMHG | BODY MASS INDEX: 44.1 KG/M2 | HEIGHT: 71 IN | HEART RATE: 56 BPM | WEIGHT: 315 LBS | OXYGEN SATURATION: 96 % | TEMPERATURE: 99.4 F | DIASTOLIC BLOOD PRESSURE: 86 MMHG

## 2024-03-15 DIAGNOSIS — Z79.01 LONG TERM (CURRENT) USE OF ANTICOAGULANTS: ICD-10-CM

## 2024-03-15 DIAGNOSIS — I48.20 CHRONIC ATRIAL FIBRILLATION (H): ICD-10-CM

## 2024-03-15 DIAGNOSIS — E11.65 TYPE 2 DIABETES MELLITUS WITH HYPERGLYCEMIA, WITHOUT LONG-TERM CURRENT USE OF INSULIN (H): Primary | ICD-10-CM

## 2024-03-15 DIAGNOSIS — G63 POLYNEUROPATHY ASSOCIATED WITH UNDERLYING DISEASE (H): ICD-10-CM

## 2024-03-15 DIAGNOSIS — E11.9 TYPE 2 DIABETES MELLITUS WITHOUT COMPLICATION, WITHOUT LONG-TERM CURRENT USE OF INSULIN (H): ICD-10-CM

## 2024-03-15 DIAGNOSIS — I73.9 PAD (PERIPHERAL ARTERY DISEASE) (H): ICD-10-CM

## 2024-03-15 DIAGNOSIS — E66.01 MORBID OBESITY (H): ICD-10-CM

## 2024-03-15 DIAGNOSIS — I50.32 CHRONIC HEART FAILURE WITH PRESERVED EJECTION FRACTION (H): ICD-10-CM

## 2024-03-15 DIAGNOSIS — I48.20 CHRONIC ATRIAL FIBRILLATION (H): Primary | ICD-10-CM

## 2024-03-15 DIAGNOSIS — I50.30 HEART FAILURE WITH PRESERVED EJECTION FRACTION, UNSPECIFIED HF CHRONICITY (H): ICD-10-CM

## 2024-03-15 DIAGNOSIS — I48.0 PAROXYSMAL ATRIAL FIBRILLATION (H): ICD-10-CM

## 2024-03-15 DIAGNOSIS — K59.00 CONSTIPATION, UNSPECIFIED CONSTIPATION TYPE: ICD-10-CM

## 2024-03-15 DIAGNOSIS — R33.9 URINARY RETENTION: ICD-10-CM

## 2024-03-15 DIAGNOSIS — E03.9 HYPOTHYROIDISM, UNSPECIFIED TYPE: ICD-10-CM

## 2024-03-15 DIAGNOSIS — N18.31 STAGE 3A CHRONIC KIDNEY DISEASE (H): ICD-10-CM

## 2024-03-15 DIAGNOSIS — L98.9 SKIN LESION: ICD-10-CM

## 2024-03-15 DIAGNOSIS — E78.5 HYPERLIPIDEMIA LDL GOAL <100: ICD-10-CM

## 2024-03-15 DIAGNOSIS — I48.19 PERSISTENT ATRIAL FIBRILLATION (H): ICD-10-CM

## 2024-03-15 DIAGNOSIS — Z12.11 SCREEN FOR COLON CANCER: ICD-10-CM

## 2024-03-15 PROBLEM — J96.12 CHRONIC HYPERCAPNIC RESPIRATORY FAILURE (H): Status: RESOLVED | Noted: 2018-10-10 | Resolved: 2024-03-15

## 2024-03-15 LAB
HBA1C MFR BLD: 6.4 % (ref 0–5.6)
INR BLD: 2 (ref 0.9–1.1)

## 2024-03-15 PROCEDURE — 99215 OFFICE O/P EST HI 40 MIN: CPT | Performed by: FAMILY MEDICINE

## 2024-03-15 PROCEDURE — G2211 COMPLEX E/M VISIT ADD ON: HCPCS | Performed by: FAMILY MEDICINE

## 2024-03-15 PROCEDURE — 83036 HEMOGLOBIN GLYCOSYLATED A1C: CPT | Performed by: FAMILY MEDICINE

## 2024-03-15 PROCEDURE — 36416 COLLJ CAPILLARY BLOOD SPEC: CPT | Performed by: FAMILY MEDICINE

## 2024-03-15 PROCEDURE — 85610 PROTHROMBIN TIME: CPT | Performed by: FAMILY MEDICINE

## 2024-03-15 RX ORDER — WARFARIN SODIUM 5 MG/1
TABLET ORAL
Qty: 180 TABLET | Refills: 3 | Status: SHIPPED | OUTPATIENT
Start: 2024-03-15

## 2024-03-15 RX ORDER — ATORVASTATIN CALCIUM 40 MG/1
40 TABLET, FILM COATED ORAL DAILY
Qty: 90 TABLET | Refills: 3 | Status: SHIPPED | OUTPATIENT
Start: 2024-03-15

## 2024-03-15 RX ORDER — PYRITHIONE ZINC 1 G/ML
1 SHAMPOO TOPICAL DAILY
Qty: 90 CAPSULE | Refills: 0 | Status: SHIPPED | OUTPATIENT
Start: 2024-03-15 | End: 2024-07-01

## 2024-03-15 RX ORDER — POTASSIUM CHLORIDE 1500 MG/1
40 TABLET, EXTENDED RELEASE ORAL 2 TIMES DAILY
Qty: 240 TABLET | Refills: 3 | Status: SHIPPED | OUTPATIENT
Start: 2024-03-15

## 2024-03-15 RX ORDER — SPIRONOLACTONE 25 MG/1
50 TABLET ORAL DAILY
Qty: 180 TABLET | Refills: 3 | Status: SHIPPED | OUTPATIENT
Start: 2024-03-15

## 2024-03-15 RX ORDER — LEVOTHYROXINE SODIUM 175 UG/1
175 TABLET ORAL
Qty: 90 TABLET | Refills: 3 | Status: SHIPPED | OUTPATIENT
Start: 2024-03-15

## 2024-03-15 RX ORDER — AMOXICILLIN 250 MG
CAPSULE ORAL
Qty: 180 TABLET | Refills: 0 | Status: SHIPPED | OUTPATIENT
Start: 2024-03-15 | End: 2024-07-01

## 2024-03-15 RX ORDER — TORSEMIDE 100 MG/1
100 TABLET ORAL DAILY
Qty: 90 TABLET | Refills: 3 | Status: SHIPPED | OUTPATIENT
Start: 2024-03-15

## 2024-03-15 RX ORDER — METOPROLOL SUCCINATE 100 MG/1
100 TABLET, EXTENDED RELEASE ORAL DAILY
Qty: 90 TABLET | Refills: 3 | Status: ON HOLD | OUTPATIENT
Start: 2024-03-15 | End: 2024-10-01

## 2024-03-15 RX ORDER — TAMSULOSIN HYDROCHLORIDE 0.4 MG/1
0.4 CAPSULE ORAL DAILY
Qty: 90 CAPSULE | Refills: 3 | Status: SHIPPED | OUTPATIENT
Start: 2024-03-15

## 2024-03-15 ASSESSMENT — COLUMBIA-SUICIDE SEVERITY RATING SCALE - C-SSRS
6. HAVE YOU EVER DONE ANYTHING, STARTED TO DO ANYTHING, OR PREPARED TO DO ANYTHING TO END YOUR LIFE?: NO
2. IN THE PAST MONTH, HAVE YOU ACTUALLY HAD ANY THOUGHTS OF KILLING YOURSELF?: NO
1. WITHIN THE PAST MONTH, HAVE YOU WISHED YOU WERE DEAD OR WISHED YOU COULD GO TO SLEEP AND NOT WAKE UP?: YES

## 2024-03-15 ASSESSMENT — PATIENT HEALTH QUESTIONNAIRE - PHQ9
10. IF YOU CHECKED OFF ANY PROBLEMS, HOW DIFFICULT HAVE THESE PROBLEMS MADE IT FOR YOU TO DO YOUR WORK, TAKE CARE OF THINGS AT HOME, OR GET ALONG WITH OTHER PEOPLE: VERY DIFFICULT
SUM OF ALL RESPONSES TO PHQ QUESTIONS 1-9: 19
SUM OF ALL RESPONSES TO PHQ QUESTIONS 1-9: 19

## 2024-03-15 NOTE — PROGRESS NOTES
ANTICOAGULATION MANAGEMENT     Clarke Moreira 73 year old male is on warfarin with therapeutic INR result. (Goal INR 2.0-3.0)    Recent labs: (last 7 days)     03/15/24  0952   INR 2.0*       ASSESSMENT     Source(s): Chart Reviewed    Medication/supplement changes: None noted  New illness, injury, or hospitalization: No   Follow-up today with Dr. Shepard - no new changes.  Previous result: Therapeutic last 7 INR visits  Additional findings: None       PLAN     Recommended plan for no diet, medication or health factor changes affecting INR     Dosing Instructions: Continue your current warfarin dose with next INR in 8 weeks       Summary  As of 3/15/2024      Full warfarin instructions:  5 mg every Thu; 10 mg all other days   Next INR check:  5/10/2024               Detailed voice message left for Clarke with dosing instructions and follow up date.     Contact [phone] to schedule and with any changes, questions or concerns.     Education provided:   Please call back if any changes to your diet, medications or how you've been taking warfarin  Taking warfarin: Importance of taking warfarin as instructed  Goal range and lab monitoring: goal range and significance of current result  Dietary considerations: importance of consistent vitamin K intake  Symptom monitoring: monitoring for bleeding signs and symptoms and when to seek medical attention/emergency care    Plan made per [facility] anticoagulation protocol    Mirna Mcknight RN  Anticoagulation Clinic  3/15/2024    _______________________________________________________________________     Anticoagulation Episode Summary       Current INR goal:  2.0-3.0   TTR:  100.0% (4.3 mo)   Target end date:  Indefinite   Send INR reminders to:  [facility]    Indications    Chronic atrial fibrillation (H) [I48.20]  Long term (current) use of anticoagulants [Z79.01]  Paroxysmal atrial fibrillation (H) [I48.0]             Comments:  Home Care              Anticoagulation Care Providers       Provider Role Specialty Phone number    Matthias Sanchez MD Referring Family Medicine [phone]    Farideh Dasilva MD Referring Family Medicine [phone]

## 2024-03-15 NOTE — PROGRESS NOTES
Answers submitted by the patient for this visit:  Patient Health Questionnaire (Submitted on 3/15/2024)  If you checked off any problems, how difficult have these problems made it for you to do your work, take care of things at home, or get along with other people?: Very difficult  PHQ9 TOTAL SCORE: 19

## 2024-03-15 NOTE — PROGRESS NOTES
Assessment & Plan     Type 2 diabetes mellitus with hyperglycemia, without long-term current use of insulin (H)  Well controlled on current medications    - Hemoglobin A1c    Chronic atrial fibrillation (H)  stable  - INR point of care    Long term (current) use of anticoagulants  For AFib  - INR point of care    Paroxysmal atrial fibrillation (H)  Rate controlled  - INR point of care  - metoprolol succinate ER (TOPROL XL) 100 MG 24 hr tablet  Dispense: 90 tablet; Refill: 3    Chronic heart failure with preserved ejection fraction (H)  Discussed importance of SGLT2 I patient declined as he is doing well-will reddiscuss in 6 months  - potassium chloride chen ER (KLOR-CON M20) 20 MEQ CR tablet  Dispense: 240 tablet; Refill: 3  - torsemide (DEMADEX) 100 MG tablet  Dispense: 90 tablet; Refill: 3    Screen for colon cancer  Patient declined [place] cancer screening    Type 2 diabetes mellitus without complication, without long-term current use of insulin (H)    - atorvastatin (LIPITOR) 40 MG tablet  Dispense: 90 tablet; Refill: 3  - metFORMIN (GLUCOPHAGE) 500 MG tablet  Dispense: 180 tablet; Refill: 3    Hypothyroidism, unspecified type  Replacement   - levothyroxine (SYNTHROID/LEVOTHROID) 175 MCG tablet  Dispense: 90 tablet; Refill: 3    Hyperlipidemia LDL goal <100    - Omega-3 Fatty Acids (EQL FISH OIL) 1000 MG CAPS  Dispense: 90 capsule; Refill: 0    Constipation, unspecified constipation type  Working effectively  - senna-docusate (SENEXON-S) 8.6-50 MG tablet  Dispense: 180 tablet; Refill: 0    Heart failure with preserved ejection fraction, unspecified HF chronicity (H)  Patient does not want referral to HF clinic -will readdress SGLT2i at next visit  - spironolactone (ALDACTONE) 25 MG tablet  Dispense: 180 tablet; Refill: 3    Urinary retention    - tamsulosin (FLOMAX) 0.4 MG capsule  Dispense: 90 capsule; Refill: 3    Persistent atrial fibrillation (H)    - warfarin ANTICOAGULANT (COUMADIN) 5 MG tablet  Dispense:  "180 tablet; Refill: 3    Skin lesion  Rule out SCCa  - Procedure Clinic - [facility] Internal Referral    Polyneuropathy associated with underlying disease (H24)      PAD (peripheral artery disease) (H24)  Continue current medications     Morbid obesity (H)  No plan to address     Stage 3a chronic kidney disease (H)  Recently improved will continue to monitor    The longitudinal plan of care for the diagnosis(es)/condition(s) as documented were addressed during this visit. Due to the added complexity in care, I will continue to support Clarke in the subsequent management and with ongoing continuity of care.     I spent a total of 50 minutes on the day of the visit.   Time spent by me doing chart review, history and exam, documentation and further activities per the note      BMI  Estimated body mass index is 54.42 kg/m  as calculated from the following:    Height as of this encounter: 1.803 m (5' 11\").    Weight as of this encounter: 177 kg (390 lb 3.2 oz).   Weight management plan: numerous comorbidities will not address    Depression Screening Follow Up              3/15/2024     1:56 PM   C-SSRS (Brief [place])   Within the last month, have you wished you were dead or wished you could go to sleep and not wake up? Yes   Within the last month, have you had any actual thoughts of killing yourself? No   Within the last month, have you ever done anything, started to do anything, or prepared to do anything to end your life? No             Follow Up Actions Taken  Crisis resource information provided in the After Visit Summary  Patient declined referral.    Discussed the following ways the patient can remain in a safe environment:  be around others        Return in about 3 months (around 6/15/2024) for Diabetes Routine Check.    Subjective   Clarke is a 73 year old, presenting for the following health issues:  Follow Up (Skin growth concerns, Diabetes, and INR) and Medication Question (60 days of klor-con )        " 3/15/2024     9:59 AM   Additional Questions   Roomed by Jad   Accompanied by Self         3/15/2024    Information    services provided? No     HPI       Diabetes Follow-up    How often are you checking your blood sugar? Not at all  What concerns do you have today about your diabetes? None   Do you have any of these symptoms? (Select all that apply)  Numbness in feet  Have you had a diabetic eye exam in the last 12 months? no            Hyperlipidemia Follow-Up    Are you regularly taking any medication or supplement to lower your cholesterol?   Yes- atorvastatin  Are you having muscle aches or other side effects that you think could be caused by your cholesterol lowering medication?  No    Hypertension Follow-up    Do you check your blood pressure regularly outside of the clinic? Yes   Are you following a low salt diet? Yes  Are your blood pressures ever more than 140 on the top number (systolic) OR more   than 90 on the bottom number (diastolic), for example 140/90? No    BP Readings from Last 2 Encounters:   03/15/24 122/86   09/13/23 126/79     Hemoglobin A1C (%)   Date Value   03/15/2024 6.4 (H)   07/03/2023 6.5 (H)   05/06/2021 6.2 (H)   11/09/2020 5.9 (H)     LDL Cholesterol Calculated (mg/dL)   Date Value   09/13/2023 58   05/09/2022 51   05/06/2021 32   11/09/2020 85         Vascular Disease Follow-up    How often do you take nitroglycerin? Never  Do you take an aspirin every day? Yes    Heart Failure Follow-up   Are you experiencing any shortness of breath? No  Are you experiencing any swelling in your legs or feet?  Stable  Are you using more pillows than usual? No  Do you cough at night?  No  Do you check your weight daily?  No  Have you had a weight change recently?  No  Are you having any of the following side effects from your medications? (Select all that apply)  The patient does not report symptoms of dizziness, fatigue, cough, swelling, or slow heart beat.  Since your last  "visit, how many times have you gone to the cardiologist, urgent care, emergency room, or hospital because of your heart failure?   None  Last Echo: Echo result w/o MOPS: Interpretation SummaryTechnically difficult study. Poor acoustic windows.Borderline (EF 50-55%) reduced left ventricular function is present.Global right ventricular function is mildly reduced. Mild right ventriculardilation is present.No significant valvular abnormalities.IVC diameter <2.1 cm collapsing >50% with sniff suggests a normal RA pressureof 3 mmHg.This study was compared with the study from 03/07/2019. No significant changesin the biventricular function based on direct visual comparison.   Not on SGLT2i  How many servings of fruits and vegetables do you eat daily?  0-1  On average, how many sweetened beverages do you drink each day (Examples: soda, juice, sweet tea, etc.  Do NOT count diet or artificially sweetened beverages)?   0  How many days per week do you exercise enough to make your heart beat faster? 3 or less  How many minutes a day do you exercise enough to make your heart beat faster? 9 or less  How many days per week do you miss taking your medication? 0  Depression   Has a long history of SI, it is stable and not out his usual state      7/3/2023     8:38 AM 9/13/2023     9:19 AM 3/15/2024     9:48 AM   PHQ   PHQ-9 Total Score 11 24 19   Q9: Thoughts of better off dead/self-harm past 2 weeks Not at all Nearly every day Nearly every day   F/U: Thoughts of suicide or self-harm  Yes Yes   F/U: Self harm-plan  No No   F/U: Self-harm action  No No   F/U: Safety concerns  No No            Objective    /86   Pulse 56   Temp 99.4  F (37.4  C) (Oral)   Resp 22   Ht 1.803 m (5' 11\")   Wt (!) 177 kg (390 lb 3.2 oz)   SpO2 96%   BMI 54.42 kg/m    Body mass index is 54.42 kg/m .  Physical Exam  Vitals reviewed.   Constitutional:       General: He is not in acute distress.     Appearance: He is well-developed. He is not " diaphoretic.   HENT:      Head: Normocephalic.   Eyes:      General: No scleral icterus.     Conjunctiva/sclera: Conjunctivae normal.   Neck:      Thyroid: No thyromegaly.   Cardiovascular:      Rate and Rhythm: Normal rate and regular rhythm.      Heart sounds: Normal heart sounds. No murmur heard.  Pulmonary:      Effort: Pulmonary effort is normal. No respiratory distress.      Breath sounds: Normal breath sounds. No wheezing.   Musculoskeletal:      Cervical back: Normal range of motion.      Left lower leg: No edema.   Skin:     General: Skin is warm and dry.          Neurological:      Mental Status: He is alert and oriented to person, place, and time.   Psychiatric:         Behavior: Behavior normal.         Thought Content: Thought content normal.         Judgment: Judgment normal.                 Signed Electronically by: Henrry Shepard MD    Answers submitted by the patient for this visit:  Patient Health Questionnaire (Submitted on 3/15/2024)  If you checked off any problems, how difficult have these problems made it for you to do your work, take care of things at home, or get along with other people?: Very difficult  PHQ9 TOTAL SCORE: 19

## 2024-04-01 ENCOUNTER — VIRTUAL VISIT (OUTPATIENT)
Dept: PSYCHOLOGY | Facility: CLINIC | Age: 74
End: 2024-04-01
Payer: COMMERCIAL

## 2024-04-01 DIAGNOSIS — F34.1 PERSISTENT DEPRESSIVE DISORDER: Primary | ICD-10-CM

## 2024-04-01 DIAGNOSIS — F33.1 MODERATE EPISODE OF RECURRENT MAJOR DEPRESSIVE DISORDER (H): ICD-10-CM

## 2024-04-01 DIAGNOSIS — F41.1 GAD (GENERALIZED ANXIETY DISORDER): ICD-10-CM

## 2024-04-01 PROCEDURE — 90791 PSYCH DIAGNOSTIC EVALUATION: CPT | Mod: 93 | Performed by: PSYCHOLOGIST

## 2024-04-01 ASSESSMENT — COLUMBIA-SUICIDE SEVERITY RATING SCALE - C-SSRS
5. HAVE YOU STARTED TO WORK OUT OR WORKED OUT THE DETAILS OF HOW TO KILL YOURSELF? DO YOU INTEND TO CARRY OUT THIS PLAN?: NO
6. HAVE YOU EVER DONE ANYTHING, STARTED TO DO ANYTHING, OR PREPARED TO DO ANYTHING TO END YOUR LIFE?: YES
6. HAVE YOU EVER DONE ANYTHING, STARTED TO DO ANYTHING, OR PREPARED TO DO ANYTHING TO END YOUR LIFE?: NO
1. IN THE PAST MONTH, HAVE YOU WISHED YOU WERE DEAD OR WISHED YOU COULD GO TO SLEEP AND NOT WAKE UP?: YES
2. HAVE YOU ACTUALLY HAD ANY THOUGHTS OF KILLING YOURSELF?: YES
4. HAVE YOU HAD THESE THOUGHTS AND HAD SOME INTENTION OF ACTING ON THEM?: NO

## 2024-04-01 NOTE — PATIENT INSTRUCTIONS
Treatment Plan    Client's Legal Name: Clarke Moreira    Client's Preferred Name:Clarke  YOB: 1950  Today's Date: April 1, 2024    Date Diagnostic Update Due: April 1, 2025    DSM-V Diagnoses:  Persistent Depressive Disorder  Major Depression, recurrent, moderate  Generalized Anxiety Disorder    Psychosocial / Contextual Factors:   The patient's mental health concerns have been continuing to affect his ability to function at home and medically and have been causing clinically significant distress.        7/3/2023     8:38 AM 9/13/2023     9:19 AM 3/15/2024     9:48 AM   PHQ-9 SCORE   PHQ-9 Total Score MyChart  24 (Severe depression) 19 (Moderately severe depression)   PHQ-9 Total Score 11 24 19         12/11/2018    10:31 AM 5/10/2019     8:26 AM 9/3/2019    10:35 AM   CHRIS-7 SCORE   Total Score 20 13 19       Collaboration: Dr. Shepard    Referral: exploring referrals to the [facility] and interventional [facility]ry    Anticipated treatment duration: Unknown  Agreed upon meeting frequency: every 2-3 weeks    Long Term Treatment Goal(s) related to diagnosis / functional impairment(s)  Goal 1: Clarke will explore additional treatment options to reduce intensity/frequency of suicidal thoughts    Intervention(s)  Therapist will provide support, psychoeducation and homework assignments as needed.    Goal 2: Clarke will challenge/reframe negative harsh thoughts that he has about himself.     Intervention(s)  Therapist will provide support, psychoeducation and homework assignments as needed.    If you need additional support and care during times that your therapist or PCP are not available, here are some additional resources for you:    Help in a Crisis:  If you need additional support and care during times that your therapist or PCP are not available, here are some additional resources for you:    988 - National Suicide Prevention Lifeline  COPE ([facility] Response Team):   [phone]   U Multilingual Crisis Line:  [phone]    [facility]: [phone]    (Emergency Psychiatric Evaluation)    [place] - [facility] [place]  24 hour crisis walk-in and crisis line  [address]  [place], [place]  [phone]    Crisis Text Line: Text [place] to 889651. Free support at your fingertips 24/7  People who text [place] to [phone] will be connected with a counselor. Crisis Text Line is available 24 hours a day, seven days a week.    If you feel at risk of immediate harm, go directly to the Emergency Department.    Patient has reviewed and agreed to the above plan verbally as this was a phone visit.    Lety Buenrostro PsyD  April 1, 2024
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
## 2024-04-02 NOTE — TELEPHONE ENCOUNTER
FUTURE VISIT INFORMATION      FUTURE VISIT INFORMATION:  Date: 7/2/24  Time: 10:20am  Location: [facility] [place]  REFERRAL INFORMATION:  Referring provider:  Henrry Shepard MD   Referring providers clinic:  [facility]  Reason for visit/diagnosis  Diabetic eye exam      RECORDS REQUESTED FROM:         Clinic name Comments Records Status Imaging Status   [facility]referral 9/13/23 [facility]     [facility] 12/2/21 [facility]

## 2024-04-04 ENCOUNTER — PRE VISIT (OUTPATIENT)
Dept: OPHTHALMOLOGY | Facility: CLINIC | Age: 74
End: 2024-04-04

## 2024-04-18 ENCOUNTER — VIRTUAL VISIT (OUTPATIENT)
Dept: PSYCHOLOGY | Facility: CLINIC | Age: 74
End: 2024-04-18
Payer: COMMERCIAL

## 2024-04-18 DIAGNOSIS — F33.1 MODERATE EPISODE OF RECURRENT MAJOR DEPRESSIVE DISORDER (H): ICD-10-CM

## 2024-04-18 DIAGNOSIS — F34.1 PERSISTENT DEPRESSIVE DISORDER: Primary | ICD-10-CM

## 2024-04-18 DIAGNOSIS — F41.1 GAD (GENERALIZED ANXIETY DISORDER): ICD-10-CM

## 2024-04-18 PROCEDURE — 90834 PSYTX W PT 45 MINUTES: CPT | Mod: 93 | Performed by: PSYCHOLOGIST

## 2024-04-18 NOTE — PROGRESS NOTES
Telemedicine Visit: The patient's condition can be safely assessed and treated via audio encounter.      Reason for Telemedicine Visit: Patient has requested telehealth visit and Patient unable to travel    Originating Site (Patient Location): Patient's home    Distant Location (provider location):  On-site    Consent:  The patient/guardian has verbally consented to: the potential risks and benefits of telemedicine (video visit) versus in person care; bill my insurance or make self-payment for services provided; and responsibility for payment of non-covered services.     Mode of Communication:  Telephone call via doximity    As the provider I attest to compliance with applicable laws and regulations related to telemedicine.    Behavioral Health Progress Note    Client's Legal Name: Clarke Moreira    Client's Preferred Name: Clarke  YOB: 1950  Type of Service: telephone  Length of Service:   Start time: 8:26  End time: 9:08  Duration: 42 minutes  Attendees: patient    Identifying Information and Presenting Problem:  Clarke is a 73 year old male who is being seen for problematic symptoms of depression, low self-worth, and negative view of himself resulting from childhood trauma and other subsequent life circumstances.    Treatment Objective(s) Addressed in This Session:  Goal 1: Clarke will learn/use coping skills to continue to reduce intensity/frequency of suicidal thoughts     Goal 2: Clarke will challenge/reframe negative harsh thoughts that he has about himself.     Progress on / Status of Treatment Objective(s) / Homework:  Clarke reports his mood is about the same       Mental Health Screening Questionnaires:  PHQ-9:       7/3/2023     8:38 AM 9/13/2023     9:19 AM 3/15/2024     9:48 AM   PHQ   PHQ-9 Total Score 11 24 19   Q9: Thoughts of better off dead/self-harm past 2 weeks Not at all Nearly every day Nearly every day   F/U: Thoughts of suicide or self-harm  Yes Yes   F/U: Self  "harm-plan  No No   F/U: Self-harm action  No No   F/U: Safety concerns  No No      CHRIS-7:       12/11/2018    10:31 AM 5/10/2019     8:26 AM 9/3/2019    10:35 AM   CHRIS-7 SCORE   Total Score 20 13 19     Topics Discussed/Interventions Provided:    Subjective: Clarke states he felt a lot of shame after our last visit because he kept me on the phone longer.  He spent quite a bit of time ruminating about this and thinking bad things about himself as a result.  Described a day that he went to [facility] to get a vaccination.  At a later time he noticed a charge on his card at [facility], but couldn't recall buying anything while there. When he looked in his freezer there was a bag of the fried chicken that it said he bought. Has had these episodes in the past, but has not had one for a long time.  Unsure what triggered this dissociative episode while he was there.      Sxs: depressed, defeated, helpless, frustrated, anxiety, full of shame    Stressors: biggest stressor identified is himself - feels like there are actions he should or should have taken to make the world better and he has \"failed\" to do this his whole life.  Continues to struggle with getting to medical appointments and keeping his apartment clean.      Coping: Main coping mechanism right now is watching the legislative proceedings. He enjoys seeing how others are able to articulate and stand up for people/issues.  Doesn't describe doing a lot of active coping.  Spends much time sitting in his apartment.      Relevant medical hx (per pt and/or chart review):  Attended appt with dr. Shepard on 3/15.  Has an appt with Dr. Jordan on 5/16    Interventions:  Supportive counseling, challenge/reframe unhelpful thoughts, looking for doable steps that he can do to take action on the things that matter to him - value based behaviors, continue to work on behavioral activation and motivational interviewing around attending medical appointments.  Tried to identify any triggers for " dissociative episode.    Mental Status Exam:  During the visit, Clarke was cooperative, open, and actively engaged .  He was alert and oriented to person, place, time, and situation. Since this is a phone visit, not able to provide information on visual aspects of the mental status exam.  Speech was normal rate and rhythm today  Mood is described as depressed.  Affect appeared depressed and was congruent with stated mood. Thought processes were circumstantial at times, but overall coherent. Thought content contained no evidence of psychotic features and Clarke reports some passive suicidal ideation, but denies any plan or intent to harm himself . Memory appeared grossly intact with no formal evaluation. Insight is good. Judgment is good. Patient exhibited good impulse control during the appointment.     Does the patient appear to be at imminent risk of harm to self/others at this time? No    The session was necessary to address chronic suicidal thoughts, depressive symptoms, and dissociative symptoms.  These symptoms of depression interfere with his ability to function in areas related to interacting and relating with others, social relationships, maintaining personal health and physical well-being, day to day self care or health behaviors, participating in meaningful activities and instrumental activities of daily living (IADLs). Ongoing psychotherapy is necessary to stabilize mood, provide counseling, improve functioning with daily activities and provide support.    DSM-5-TR Diagnoses:  Persistent Depressive Disorder  Major Depression, recurrent, moderate  Generalized Anxiety Disorder    Recommendations & Plan:   Follow up on 5/9  Explore barriers to engaging with the [employer]  Check back in if he would like referral to the interventional psychiatry program.   Needs help with laundry - will discuss any options with social work team    Lety Buenrostro PsyD, JIN    NOTE: Treatment plan update due  7/25/23.  Diagnostic assessment update due 1/25/24.

## 2024-04-22 ENCOUNTER — TELEPHONE (OUTPATIENT)
Dept: FAMILY MEDICINE | Facility: CLINIC | Age: 74
End: 2024-04-22
Payer: COMMERCIAL

## 2024-04-22 NOTE — TELEPHONE ENCOUNTER
called patient to follow up on possible laundry resources.    Voicemail was left for patient.     Patient was given contact information for [facility], as they offer assistance with laundry and other services.    SW also sent message via [email or web address] with this information as well.    Patient was encouraged to call with any questions or concerns.

## 2024-05-07 ENCOUNTER — TELEPHONE (OUTPATIENT)
Dept: FAMILY MEDICINE | Facility: CLINIC | Age: 74
End: 2024-05-07
Payer: COMMERCIAL

## 2024-05-07 NOTE — TELEPHONE ENCOUNTER
[facility] phone call message - order or referral request for patient:     Order or referral being requested: Lab order       Additional Comments: Patient is requesting a diabetic urine order to be placed for lab. Patient wants the lab order placed before 05/17.    OK to leave a message on voice mail? Yes    Primary language: English      needed? No    Call taken on May 7, 2024 at 9:48 AM by Domenica Lawrence

## 2024-05-07 NOTE — TELEPHONE ENCOUNTER
Microalbumin was completed 9/13/2023. No medical indication for repeat testing until Sept 2024.     - If pt is having other concerns or would like other testing, please schedule for provider visit.    Moises Kaur, DO

## 2024-05-09 ENCOUNTER — VIRTUAL VISIT (OUTPATIENT)
Dept: PSYCHOLOGY | Facility: CLINIC | Age: 74
End: 2024-05-09
Payer: COMMERCIAL

## 2024-05-09 DIAGNOSIS — F34.1 PERSISTENT DEPRESSIVE DISORDER: Primary | ICD-10-CM

## 2024-05-09 DIAGNOSIS — F41.1 GAD (GENERALIZED ANXIETY DISORDER): ICD-10-CM

## 2024-05-09 DIAGNOSIS — F33.1 MODERATE EPISODE OF RECURRENT MAJOR DEPRESSIVE DISORDER (H): ICD-10-CM

## 2024-05-09 PROCEDURE — 90834 PSYTX W PT 45 MINUTES: CPT | Mod: 93 | Performed by: PSYCHOLOGIST
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
## 2024-05-17 ENCOUNTER — LAB (OUTPATIENT)
Dept: LAB | Facility: CLINIC | Age: 74
End: 2024-05-17
Payer: COMMERCIAL

## 2024-05-17 ENCOUNTER — OFFICE VISIT (OUTPATIENT)
Dept: ORTHOPEDICS | Facility: CLINIC | Age: 74
End: 2024-05-17
Payer: COMMERCIAL

## 2024-05-17 ENCOUNTER — ANTICOAGULATION THERAPY VISIT (OUTPATIENT)
Dept: ANTICOAGULATION | Facility: CLINIC | Age: 74
End: 2024-05-17

## 2024-05-17 DIAGNOSIS — Z79.01 LONG TERM (CURRENT) USE OF ANTICOAGULANTS: ICD-10-CM

## 2024-05-17 DIAGNOSIS — B35.1 OM (ONYCHOMYCOSIS): ICD-10-CM

## 2024-05-17 DIAGNOSIS — E11.65 TYPE 2 DIABETES MELLITUS WITH HYPERGLYCEMIA, WITHOUT LONG-TERM CURRENT USE OF INSULIN (H): ICD-10-CM

## 2024-05-17 DIAGNOSIS — I48.0 PAROXYSMAL ATRIAL FIBRILLATION (H): ICD-10-CM

## 2024-05-17 DIAGNOSIS — I48.20 CHRONIC ATRIAL FIBRILLATION (H): Primary | ICD-10-CM

## 2024-05-17 DIAGNOSIS — E11.49 TYPE II OR UNSPECIFIED TYPE DIABETES MELLITUS WITH NEUROLOGICAL MANIFESTATIONS, NOT STATED AS UNCONTROLLED(250.60) (H): Primary | ICD-10-CM

## 2024-05-17 DIAGNOSIS — I48.20 CHRONIC ATRIAL FIBRILLATION (H): ICD-10-CM

## 2024-05-17 DIAGNOSIS — E11.65 TYPE 2 DIABETES MELLITUS WITH HYPERGLYCEMIA, WITHOUT LONG-TERM CURRENT USE OF INSULIN (H): Primary | ICD-10-CM

## 2024-05-17 LAB
HOLD SPECIMEN: NORMAL
INR BLD: 1.9 (ref 0.9–1.1)

## 2024-05-17 PROCEDURE — 99213 OFFICE O/P EST LOW 20 MIN: CPT | Performed by: PODIATRIST

## 2024-05-17 PROCEDURE — 36415 COLL VENOUS BLD VENIPUNCTURE: CPT | Performed by: PATHOLOGY

## 2024-05-17 PROCEDURE — 82043 UR ALBUMIN QUANTITATIVE: CPT | Performed by: FAMILY MEDICINE

## 2024-05-17 PROCEDURE — 99000 SPECIMEN HANDLING OFFICE-LAB: CPT | Performed by: PATHOLOGY

## 2024-05-17 PROCEDURE — 85610 PROTHROMBIN TIME: CPT | Performed by: PATHOLOGY

## 2024-05-17 NOTE — PROGRESS NOTES
ANTICOAGULATION MANAGEMENT     Clarke Moreira 73 year old male is on warfarin with subtherapeutic INR result. (Goal INR 2.0-3.0)    Recent labs: (last 7 days)     05/17/24  1011   INR 1.9*       ASSESSMENT     Source(s): Chart Review and Patient/Caregiver Call     Warfarin doses taken: Warfarin taken as instructed  Diet: No new diet changes identified  Medication/supplement changes: None noted  New illness, injury, or hospitalization: No.   Appt today with Podiatrist for diabetic foot exam - nails debrided x10.  Signs or symptoms of bleeding or clotting: No  Previous result: Therapeutic last 7(+) INR visits  Additional findings:  INR trended lower.       PLAN     Recommended plan for no diet, medication or health factor changes affecting INR     Dosing Instructions: Increase your warfarin dose (7.7% change) with next INR in 2 weeks       Summary  As of 5/17/2024      Full warfarin instructions:  10 mg every day   Next INR check:  5/31/2024               Telephone call with Clarke who verbalizes understanding and agrees to plan    Patient offered & declined to schedule next visit    Education provided:   Taking warfarin: Importance of taking warfarin as instructed  Goal range and lab monitoring: goal range and significance of current result    Plan made per [facility] anticoagulation protocol    Mirna Mcknight RN  Anticoagulation Clinic  5/17/2024    _______________________________________________________________________     Anticoagulation Episode Summary       Current INR goal:  2.0-3.0   TTR:  67.4% (6.4 mo)   Target end date:  Indefinite   Send INR reminders to:  [facility]    Indications    Chronic atrial fibrillation (H) [I48.20]  Long term (current) use of anticoagulants [Z79.01]  Paroxysmal atrial fibrillation (H) [I48.0]             Comments:  Home Care             Anticoagulation Care Providers       Provider Role Specialty Phone number    Matthias Sanchez MD Referring Family Medicine [phone]     Farideh Dasilva MD [facility] [phone]

## 2024-05-17 NOTE — PROGRESS NOTES
Chief Complaint   Patient presents with    RECHECK     Diabetic foot exam                Allergies   Allergen Reactions    Seasonal Allergies          Subjective: Clarke is a 73 year old male who presents to the clinic today for a diabetic foot exam and management.  He has no new open lesions. He sees Dr. Shepard for his diabetic care and was seen 3/15/24.    Objective  Hemoglobin A1C   Date Value Ref Range Status   03/15/2024 6.4 (H) 0.0 - 5.6 % Final     Comment:     Normal <5.7%   Prediabetes 5.7-6.4%    Diabetes 6.5% or higher     Note: Adopted from ADA consensus guidelines.   07/03/2023 6.5 (H) 0.0 - 5.6 % Final     Comment:     Normal <5.7%   Prediabetes 5.7-6.4%    Diabetes 6.5% or higher     Note: Adopted from ADA consensus guidelines.   12/13/2022 6.4 (H) 0.0 - 5.6 % Final     Comment:     Normal <5.7%   Prediabetes 5.7-6.4%    Diabetes 6.5% or higher     Note: Adopted from ADA consensus guidelines.   05/06/2021 6.2 (H) 0 - 5.6 % Final     Comment:     Normal <5.7% Prediabetes 5.7-6.4%  Diabetes 6.5% or higher - adopted from ADA   consensus guidelines.     11/09/2020 5.9 (H) 4.1 - 5.7 % Final   07/09/2020 5.9 (H) 0 - 5.6 % Final     Comment:     Normal <5.7% Prediabetes 5.7-6.4%  Diabetes 6.5% or higher - adopted from ADA   consensus guidelines.         DP and PT pulses 1/4 BL. CRT 1 second. Absent pedal hair.  Gross and protective sensations are diminished BL.  Hammertoes to all lesser digits. Hallux malleus BL. Equinus BL. Pes planus.   Onychomycosis to toes x10.  With debridement of the right medial hallux nail, there was a sanguinous bulla subungually.  No signs or symptoms of infection.  Tyloma noted to the left 5th digit DIPJ, right dorsal hallux IPJ, and right 5th DIPJ  No open lesions noted.       Assessment: Clarke is a 74 YO male with DM2 and neuropathy.  Ulceration on the left dorsal hallux. Clinically, this has healed.   Onychomycosis  Tyloma.      Plan:   - Pt seen and evaluated  - Nails  debrided x 10.   - Daily foot exams.   - Pt to return to clinic in 12 weeks or sooner if problems arise.

## 2024-05-17 NOTE — LETTER
5/17/2024         RE: Clarke Moreira  [address]  [facility] [address]        Dear Colleague,    Thank you for referring your patient, Clarke Moreira, to the [facility] [place]. Please see a copy of my visit note below.    Chief Complaint   Patient presents with    RECHECK     Diabetic foot exam                Allergies   Allergen Reactions    Seasonal Allergies          Subjective: Clarke is a 73 year old male who presents to the clinic today for a diabetic foot exam and management.  He has no new open lesions. He sees Dr. Shepard for his diabetic care and was seen 3/15/24.    Objective  Hemoglobin A1C   Date Value Ref Range Status   03/15/2024 6.4 (H) 0.0 - 5.6 % Final     Comment:     Normal <5.7%   Prediabetes 5.7-6.4%    Diabetes 6.5% or higher     Note: Adopted from ADA consensus guidelines.   07/03/2023 6.5 (H) 0.0 - 5.6 % Final     Comment:     Normal <5.7%   Prediabetes 5.7-6.4%    Diabetes 6.5% or higher     Note: Adopted from ADA consensus guidelines.   12/13/2022 6.4 (H) 0.0 - 5.6 % Final     Comment:     Normal <5.7%   Prediabetes 5.7-6.4%    Diabetes 6.5% or higher     Note: Adopted from ADA consensus guidelines.   05/06/2021 6.2 (H) 0 - 5.6 % Final     Comment:     Normal <5.7% Prediabetes 5.7-6.4%  Diabetes 6.5% or higher - adopted from ADA   consensus guidelines.     11/09/2020 5.9 (H) 4.1 - 5.7 % Final   07/09/2020 5.9 (H) 0 - 5.6 % Final     Comment:     Normal <5.7% Prediabetes 5.7-6.4%  Diabetes 6.5% or higher - adopted from ADA   consensus guidelines.         DP and PT pulses 1/4 BL. CRT 1 second. Absent pedal hair.  Gross and protective sensations are diminished BL.  Hammertoes to all lesser digits. Hallux malleus BL. Equinus BL. Pes planus.   Onychomycosis to toes x10.  With debridement of the right medial hallux nail, there was a sanguinous bulla subungually.  No signs or symptoms of infection.  Tyloma noted to the left 5th digit DIPJ,  right dorsal hallux IPJ, and right 5th DIPJ  No open lesions noted.       Assessment: Clarke is a 72 YO male with DM2 and neuropathy.  Ulceration on the left dorsal hallux. Clinically, this has healed.   Onychomycosis  Tyloma.      Plan:   - Pt seen and evaluated  - Nails debrided x 10.   - Daily foot exams.   - Pt to return to clinic in 12 weeks or sooner if problems arise.         Jesús Lagos DPM

## 2024-05-18 LAB
CREAT UR-MCNC: 67.7 MG/DL
MICROALBUMIN UR-MCNC: <12 MG/L
MICROALBUMIN/CREAT UR: NORMAL MG/G{CREAT}

## 2024-05-30 ENCOUNTER — VIRTUAL VISIT (OUTPATIENT)
Dept: PSYCHOLOGY | Facility: CLINIC | Age: 74
End: 2024-05-30
Payer: COMMERCIAL

## 2024-05-30 DIAGNOSIS — F33.1 MODERATE EPISODE OF RECURRENT MAJOR DEPRESSIVE DISORDER (H): ICD-10-CM

## 2024-05-30 DIAGNOSIS — F41.1 GAD (GENERALIZED ANXIETY DISORDER): ICD-10-CM

## 2024-05-30 DIAGNOSIS — F34.1 PERSISTENT DEPRESSIVE DISORDER: Primary | ICD-10-CM

## 2024-05-30 PROCEDURE — 90834 PSYTX W PT 45 MINUTES: CPT | Mod: 93 | Performed by: PSYCHOLOGIST

## 2024-05-30 NOTE — PROGRESS NOTES
Telemedicine Visit: The patient's condition can be safely assessed and treated via audio encounter.      Reason for Telemedicine Visit: Patient has requested telehealth visit and Patient unable to travel    Originating Site (Patient Location): Patient's home    Distant Location (provider location):  On-site    Consent:  The patient/guardian has verbally consented to: the potential risks and benefits of telemedicine (video visit) versus in person care; bill my insurance or make self-payment for services provided; and responsibility for payment of non-covered services.     Mode of Communication:  Telephone call via doximity    As the provider I attest to compliance with applicable laws and regulations related to telemedicine.    Behavioral Health Progress Note    Client's Legal Name: Clarke Moreira    Client's Preferred Name: Clarke  YOB: 1950  Type of Service: telephone  Length of Service:   Start time: 8:22  End time:  9:02  Duration: 40 minutes  Attendees: patient    Identifying Information and Presenting Problem:  Clarke is a 74 year old male who is being seen for problematic symptoms of depression, low self-worth, and negative view of himself resulting from childhood trauma and other subsequent life circumstances.    Treatment Objective(s) Addressed in This Session:  Goal 1: Clarke will learn/use coping skills to continue to reduce intensity/frequency of suicidal thoughts     Goal 2: Clarke will challenge/reframe negative harsh thoughts that he has about himself.     Progress on / Status of Treatment Objective(s) / Homework:  Stable       Mental Health Screening Questionnaires:  PHQ-9:       7/3/2023     8:38 AM 9/13/2023     9:19 AM 3/15/2024     9:48 AM   PHQ   PHQ-9 Total Score 11 24 19   Q9: Thoughts of better off dead/self-harm past 2 weeks Not at all Nearly every day Nearly every day   F/U: Thoughts of suicide or self-harm  Yes Yes   F/U: Self harm-plan  No No   F/U: Self-harm action   No No   F/U: Safety concerns  No No      CHRIS-7:       12/11/2018    10:31 AM 5/10/2019     8:26 AM 9/3/2019    10:35 AM   CHRIS-7 SCORE   Total Score 20 13 19     Topics Discussed/Interventions Provided:  Visit today focused on Clarke's recent experience of his birthday.  There are many conflicted memories that come up about his birthday.  Has typically wanted to ignore his birthday so he doesn't have to feel the disappointment afterwards when it doesn't go the way he had hoped. This year he had a very different reaction.  He celebrated his birthday with his friend Janice.  He was quite moved by her spending the time with him and also a conversation he had with her .  These experiences challenged the many thoughts he has about being worthless and a burden on others.  Session focused on exploring how this information impacts the narrative that he holds about himself to continue to work to reduce depressive symptoms and passive suicidal ideation.        Mental Status Exam:  Clarke was open, engaged, pleasant, and respectful.  He was alert and oriented to person, place, time, and situation. Since this is a phone visit, I am not able to provide information on visual aspects of the mental status exam.  His speech is his typical rate and rhythm.  Mood is described as depressed.  Affect was down, ongruent with stated mood. Thought processes are logical and coherent.  Thought content contained no evidence of psychotic features and Clarke reports ongoing passive suicidal ideation, but denies any plan or intent to harm himself . Memory appeared grossly intact with no formal evaluation. Insight is good. Judgment is good. Patient exhibited good impulse control during the appointment.     Does the patient appear to be at imminent risk of harm to self/others at this time? No    The session was necessary to address persistent depressive symptoms and passive suicidal ideation.  These symptoms significantly interfere with his  ability to function in areas related to interacting and relating with others, social relationships, maintaining personal health and physical well-being, day to day self care or health behaviors, participating in meaningful activities and instrumental activities of daily living (IADLs). Ongoing psychotherapy is necessary to stabilize mood, provide counseling, improve functioning with daily activities and provide support.    DSM-5-TR Diagnoses:  Persistent Depressive Disorder  Major Depression, recurrent, moderate  Generalized Anxiety Disorder    Recommendations & Plan:   Next appt on 6/13  Check in re: interventional psychiatry program  Check-in re: lajulianary tona Buenrostro PsyD, JIN    NOTE: Treatment plan update due 4/1/25.  Diagnostic assessment update due 4/1/25

## 2024-06-04 ENCOUNTER — DOCUMENTATION ONLY (OUTPATIENT)
Dept: FAMILY MEDICINE | Facility: CLINIC | Age: 74
End: 2024-06-04
Payer: COMMERCIAL

## 2024-06-04 ENCOUNTER — TELEPHONE (OUTPATIENT)
Dept: FAMILY MEDICINE | Facility: CLINIC | Age: 74
End: 2024-06-04
Payer: COMMERCIAL

## 2024-06-04 NOTE — TELEPHONE ENCOUNTER
Called patient and I let him know that I do not see that we received a form, I called [facility] and left a message.    I checked with medical records and was told the form came in yesterday and was put in the Grn team folder.    Called patient back and let him know that we have the form and will process per protocol.    Teodora Webber RN

## 2024-06-04 NOTE — TELEPHONE ENCOUNTER
[facility] phone call message- general phone call:    Reason for call: The patient sent some paperwork to be filled out by PCP to [email or web address] and the organization is stating the paperwork was not filled out-with a message: Cannot complete. The patient nithin like a call back to find out why.     Return call needed: Yes    OK to leave a message on voice mail? Yes    Primary language: English      needed? No    Call taken on June 4, 2024 at 8:15 AM by Meera Calderon

## 2024-06-04 NOTE — PROGRESS NOTES
"When opening a documentation only encounter, be sure to enter in \"Chief Complaint\" Forms and in \" Comments\" Title of form, description if needed.    Clarke is a 74 year old  male  Form received via: Fax  Form now resides in: Provider Ready    Erna Schwartz MA             Form has been completed by provider.     Form sent out via: Fax to [facility] at Fax Number: [phone]  Patient informed: N/a  Output date: June 4, 2024    Erna Schwartz MA      **Please close the encounter**   "

## 2024-06-07 ENCOUNTER — TELEPHONE (OUTPATIENT)
Dept: ANTICOAGULATION | Facility: CLINIC | Age: 74
End: 2024-06-07
Payer: COMMERCIAL

## 2024-06-07 NOTE — TELEPHONE ENCOUNTER
ANTICOAGULATION     Clarke Moreira is overdue for an INR check.     Spoke with Clarke who declined to schedule a lab appointment at this time. If calling back please schedule as soon as possible.   - he will call to schedule INR appt    Mirna Mcknight RN

## 2024-06-13 ENCOUNTER — VIRTUAL VISIT (OUTPATIENT)
Dept: PSYCHOLOGY | Facility: CLINIC | Age: 74
End: 2024-06-13
Payer: COMMERCIAL

## 2024-06-13 DIAGNOSIS — F33.1 MODERATE EPISODE OF RECURRENT MAJOR DEPRESSIVE DISORDER (H): ICD-10-CM

## 2024-06-13 DIAGNOSIS — F41.1 GAD (GENERALIZED ANXIETY DISORDER): ICD-10-CM

## 2024-06-13 DIAGNOSIS — F34.1 PERSISTENT DEPRESSIVE DISORDER: Primary | ICD-10-CM

## 2024-06-13 PROCEDURE — 90834 PSYTX W PT 45 MINUTES: CPT | Mod: 93 | Performed by: PSYCHOLOGIST

## 2024-06-13 NOTE — PROGRESS NOTES
Telemedicine Visit: The patient's condition can be safely assessed and treated via audio encounter.      Reason for Telemedicine Visit: Patient has requested telehealth visit and Patient unable to travel    Originating Site (Patient Location): Patient's home    Distant Location (provider location):  On-site    Consent:  The patient/guardian has verbally consented to: the potential risks and benefits of telemedicine (video visit) versus in person care; bill my insurance or make self-payment for services provided; and responsibility for payment of non-covered services.     Mode of Communication:  Telephone call via doximity    As the provider I attest to compliance with applicable laws and regulations related to telemedicine.    Behavioral Health Progress Note    Client's Legal Name: Clarke Moreira    Client's Preferred Name: Clarke  YOB: 1950  Type of Service: telephone  Length of Service:   Start time: 8:20  End time:  9:02  Duration: 42 minutes  Attendees: patient    Identifying Information and Presenting Problem:  Clarke is a 74 year old male who is being seen for problematic symptoms of depression, low self-worth, and negative view of himself resulting from childhood trauma and other subsequent life circumstances.    Treatment Objective(s) Addressed in This Session:  Goal 1: Clarke will learn/use coping skills to continue to reduce intensity/frequency of suicidal thoughts     Goal 2: Clarke will challenge/reframe negative harsh thoughts that he has about himself.     Progress on / Status of Treatment Objective(s) / Homework:  Stable       Mental Health Screening Questionnaires:  PHQ-9:       7/3/2023     8:38 AM 9/13/2023     9:19 AM 3/15/2024     9:48 AM   PHQ   PHQ-9 Total Score 11 24 19   Q9: Thoughts of better off dead/self-harm past 2 weeks Not at all Nearly every day Nearly every day   F/U: Thoughts of suicide or self-harm  Yes Yes   F/U: Self harm-plan  No No   F/U: Self-harm action   "No No   F/U: Safety concerns  No No      CHRIS-7:       12/11/2018    10:31 AM 5/10/2019     8:26 AM 9/3/2019    10:35 AM   CHRIS-7 SCORE   Total Score 20 13 19     Topics Discussed/Interventions Provided:  Session focused on catastrophic and negative thoughts that tend to paralyze Clarke and result in inaction that then contributes to more unhelpful thinking.  Clarke described as a situation that is happening in his building where they are coming in to each apartment to add a sprinkler system.  Identified the emotions he is experiencing about this. Feeling very anxious, overwhelmed, and helpless.  Identified thoughts he is having -\"they are going to come into my apartment and decide to evict me, if I ask them questions they will see me as problematic and try to evict me, I can't pack up my things into the bins\" Identified actions he has taken/not taken - called management team 10 days ago, but hasn't heard back.  There is a process identified in the information he was provided to request assistance, but gets overwhelmed every time he sits down to write the email, so has been avoiding it.  Used socratic questioning to examine catastrophic thoughts, identifying information that supports and refutes these thoughts, as well as to come up with a more balanced thought.  Clarke identified \"I just have to take the first step to start the process\" as his more balanced thought.  Explored what action steps he could take that would be a first step.  Together we composed an email he can send to the [employer] company requesting assistance, as well as a list of more detailed questions he can ask when they get back to him.      Mental Status Exam:  Clarke was cooperative, open, easy to engage with, and pleasant.  He was alert and oriented to all spheres . Since this is a phone visit, I'm unable to provide information on visual aspects of the mental status exam.  Clarke's speech is his usual rate and rhythm.  Mood is described as " anxious  Affect was anxious, ongruent with stated mood and thought content. Thought processes are logical and coherent.  Thought content contained no evidence of psychotic features and no evidence of suicide, homicide, or nonsuicidal self-injury related concerns. Memory appeared grossly intact with no formal evaluation. Insight is good. Judgment is good. Patient exhibited good impulse control during the appointment.     Does the patient appear to be at imminent risk of harm to self/others at this time? No    The session was necessary to address anxiety and persistent depressive symptoms that can lead to avoidance of situations causing more emotion and potential negative consequences over time.  These symptoms significantly interfere with his ability to function in areas related to interacting and relating with others, social relationships, maintaining personal health and physical well-being, day to day self care or health behaviors, participating in meaningful activities and instrumental activities of daily living (IADLs). Ongoing psychotherapy is necessary to stabilize mood, provide counseling, improve functioning with daily activities and provide support.    DSM-5-TR Diagnoses:  Persistent Depressive Disorder  Major Depression, recurrent, moderate  Generalized Anxiety Disorder    Recommendations & Plan:   Next appt on 6/28  Sent him [email or web address] message with the email message we wrote together  Check on interventional psychiatry program  Check on laundry help    Lety Buenrostro PsyD, JIN    NOTE: Treatment plan update due 4/1/25.  Diagnostic assessment update due 4/1/25

## 2024-06-14 ENCOUNTER — TELEPHONE (OUTPATIENT)
Dept: ANTICOAGULATION | Facility: CLINIC | Age: 74
End: 2024-06-14

## 2024-06-14 NOTE — TELEPHONE ENCOUNTER
ANTICOAGULATION     Clarke Moreira is overdue for an INR check.     Spoke with Clarke who declined to schedule a lab appointment at this time. If calling back please schedule as soon as possible.   - stated he will call next wk to make INR appt, probably Tuesday   - he cancelled INR appt on 6/14.    Mirna Mcknight RN

## 2024-06-17 PROBLEM — Z71.89 ADVANCED DIRECTIVES, COUNSELING/DISCUSSION: Status: RESOLVED | Noted: 2019-09-03 | Resolved: 2024-06-17

## 2024-06-20 NOTE — LETTER
March 30, 2018      Clarke Moreira  [address]  [facility] [address]        Dear Clarke,    Thank you for getting your care at [facility]. Please see below for your test results.  Thyroid is back into the normal range. Good.  Electrolytes are good  A1c (diabetes) is good  BNP (CHF marker) is higher at 1060. Previous was 770. But you are feeling well. We will monitor.     Resulted Orders   TSH with free T4 reflex   Result Value Ref Range    TSH 3.68 0.40 - 4.00 mU/L   Basic Metabolic Panel ([facility])   Result Value Ref Range    Urea Nitrogen 27.8 (H) 7.0 - 21.0 mg/dL    Calcium 9.6 8.5 - 10.1 mg/dL    Chloride 99.9 98.0 - 110.0 mmol/L    Carbon Dioxide 27.6 20.0 - 32.0 mmol/L    Creatinine 1.0 0.7 - 1.3 mg/dL    Glucose 117.8 (H) 70.0 - 99.0 mg'dL    Potassium 4.2 3.3 - 4.5 mmol/dL    Sodium 134.0 132.6 - 141.4 mmol/L    GFR Estimate 79.2 >60.0 mL/min/1.7 m2    GFR Estimate If Black >90 >60.0 mL/min/1.7 m2   Hemoglobin A1c ([facility])   Result Value Ref Range    Hemoglobin A1C 6.3 (H) 4.1 - 5.7 %   N terminal pro BNP outpatient   Result Value Ref Range    N-Terminal Pro Bnp 1060 (H) 0 - 125 pg/mL      Comment:         Reference range shown and results flagged as abnormal are for the outpatient,   non acute settings. Establishing a baseline value for each individual patient   is useful for follow-up.  Suggested inpatient cut points for confirming diagnosis of CHF in an acute   setting are:   >450 pg/mL (age 18 to less than 50)   >900 pg/mL (age 50 to less than 75)   >1800 pg/mL (75 yrs and older)  An inpatient or emergency department NT-proPBNP <300 pg/mL effectively rules   out acute CHF, with 99% negative predictive value.              Sincerely,    Matthias Sanchez MD    
Ambulatory

## 2024-06-21 ENCOUNTER — DOCUMENTATION ONLY (OUTPATIENT)
Dept: ANTICOAGULATION | Facility: CLINIC | Age: 74
End: 2024-06-21
Payer: COMMERCIAL

## 2024-06-21 NOTE — LETTER
[facility]  [address]  [facility] [address]  Phone: [phone]  Fax: [phone]   June 21, 2024        Clarke Moreira  [address]  [facility] [address]            Dear Clarke,    You are currently under the care of [facility] for your warfarin (Coumadin , Jantoven ) therapy.  We are contacting you because our records show you were due for an INR on 5/31/24.    Last INR was in 5/17/24     There are potentially serious risks when taking warfarin without careful monitoring and we want to make sure you are safely managed.  Routine lab monitoring is required for warfarin refills.     Please call [phone]  as soon as possible to schedule a lab appointment. If it is difficult for you to get to lab, please call us to discuss options.  If there has been a change in your care or other concerns, please let us know so we can help and/or update our records.         Sincerely,       [facility]

## 2024-06-28 ENCOUNTER — VIRTUAL VISIT (OUTPATIENT)
Dept: PSYCHOLOGY | Facility: CLINIC | Age: 74
End: 2024-06-28
Payer: COMMERCIAL

## 2024-06-28 DIAGNOSIS — F33.1 MODERATE EPISODE OF RECURRENT MAJOR DEPRESSIVE DISORDER (H): ICD-10-CM

## 2024-06-28 DIAGNOSIS — F34.1 PERSISTENT DEPRESSIVE DISORDER: Primary | ICD-10-CM

## 2024-06-28 DIAGNOSIS — F41.1 GAD (GENERALIZED ANXIETY DISORDER): ICD-10-CM

## 2024-06-28 PROCEDURE — 90834 PSYTX W PT 45 MINUTES: CPT | Mod: 93 | Performed by: PSYCHOLOGIST

## 2024-06-28 NOTE — PROGRESS NOTES
Telemedicine Visit: The patient's condition can be safely assessed and treated via audio encounter.      Reason for Telemedicine Visit: Patient has requested telehealth visit and Patient unable to travel    Originating Site (Patient Location): Patient's home    Distant Location (provider location):  On-site    Consent:  The patient/guardian has verbally consented to: the potential risks and benefits of telemedicine (video visit) versus in person care; bill my insurance or make self-payment for services provided; and responsibility for payment of non-covered services.     Mode of Communication:  Telephone call via doximity    As the provider I attest to compliance with applicable laws and regulations related to telemedicine.    Behavioral Health Progress Note    Client's Legal Name: Clarke Moreira    Client's Preferred Name: Clarke  YOB: 1950  Type of Service: telephone  Length of Service:   Start time: 8:45  End time:  9:30  Duration: 45 minutes  Attendees: patient    Identifying Information and Presenting Problem:  Clarke is a 74 year old male who is being seen for problematic symptoms of depression, low self-worth, and negative view of himself resulting from childhood trauma and other subsequent life circumstances.    Treatment Objective(s) Addressed in This Session:  Goal 1: Clarke will learn/use coping skills to continue to reduce intensity/frequency of suicidal thoughts     Goal 2: Clarke will challenge/reframe negative harsh thoughts that he has about himself.     Progress on / Status of Treatment Objective(s) / Homework:  Reports symptoms are about the same, some anxiety related to things happening at the apartment building       Mental Health Screening Questionnaires:  PHQ-9:       7/3/2023     8:38 AM 9/13/2023     9:19 AM 3/15/2024     9:48 AM   PHQ   PHQ-9 Total Score 11 24 19   Q9: Thoughts of better off dead/self-harm past 2 weeks Not at all Nearly every day Nearly every day    F/U: Thoughts of suicide or self-harm  Yes Yes   F/U: Self harm-plan  No No   F/U: Self-harm action  No No   F/U: Safety concerns  No No      CHRIS-7:       12/11/2018    10:31 AM 5/10/2019     8:26 AM 9/3/2019    10:35 AM   CHRIS-7 SCORE   Total Score 20 13 19     Topics Discussed/Interventions Provided:  Used problem solving therapy to address anxiety and concerns around upcoming sprinkler installation in his apartment which requires him to do physical work around the apartment prior to them coming, as well as be absent from the apartment during the day when they are doing the work.  These types of situations can feel overwhelming to Clarke which the often results in avoidance in terms of addressing the problem.  In this situation, he has taken several steps, but has not received a response.  We identified the problem, brainstormed options, weighed pros/cons of the options, selected an option, and came up with an action plan.  Clarke is going to call the [facility] to see if there are any options they are aware of that could help with these.      Clarke identified positives that have happened in the past couple of weeks, as well as some interactions that he is feeling grateful for.      Mental Status Exam:  Clarke was open, engaged, and pleasant.  He was alert and oriented to person, place, time, and situation. Since this is a phone visit, I am not able to provide information on visual aspects of the mental status exam.  Clarke's speech is of normal rate and rhythm.  Mood is described as anxious.  Affect was mood congruent. Thought processes are logical and coherent.  Thought content contained no evidence of psychotic features and no evidence of suicide, homicide, or nonsuicidal self-injury related concerns. Memory appeared grossly intact with no formal evaluation. Insight is good. Judgment is good. Patient exhibited good impulse control during the appointment.     Does the patient appear to be at imminent  risk of harm to self/others at this time? No    The session was necessary to address anxiety and persistent depressive symptoms that can sometimes make it challenging to problem solve situations when they arise.  Anxiety and depressive symptoms significantly interfere with his ability to function in areas related to interacting and relating with others, social relationships, maintaining personal health and physical well-being, day to day self care or health behaviors, participating in meaningful activities and instrumental activities of daily living (IADLs). Ongoing psychotherapy is necessary to stabilize mood, provide counseling, improve functioning with daily activities and provide support.    DSM-5-TR Diagnoses:  Persistent Depressive Disorder  Major Depression, recurrent, moderate  Generalized Anxiety Disorder    Recommendations & Plan:   Next appt on 7/17  Check on interventional psychiatry program at our next visit  Has connected with laundry service and figuring out final steps to get it set up  Clarke is going to call [facility] to see what they may be able to help with when he has to pack up things in his apartment for the [employer] system install and be out of his apartment for daytime hours when it happens    Lety Buenrostro PsyD,     NOTE: Treatment plan update due 4/1/25.  Diagnostic assessment update due 4/1/25

## 2024-06-29 DIAGNOSIS — E78.5 HYPERLIPIDEMIA LDL GOAL <100: ICD-10-CM

## 2024-06-29 DIAGNOSIS — E11.9 TYPE 2 DIABETES MELLITUS WITHOUT COMPLICATION, WITHOUT LONG-TERM CURRENT USE OF INSULIN (H): ICD-10-CM

## 2024-06-29 DIAGNOSIS — K59.00 CONSTIPATION, UNSPECIFIED CONSTIPATION TYPE: ICD-10-CM

## 2024-07-01 RX ORDER — AMOXICILLIN 250 MG
CAPSULE ORAL
Qty: 180 TABLET | Refills: 0 | Status: SHIPPED | OUTPATIENT
Start: 2024-07-01

## 2024-07-01 RX ORDER — FOLIC ACID/MV,IRON,MIN/LUTEIN 0.4-18-25
TABLET ORAL
Qty: 100 TABLET | Refills: 0 | Status: SHIPPED | OUTPATIENT
Start: 2024-07-01

## 2024-07-01 RX ORDER — PYRITHIONE ZINC 1 G/ML
1 SHAMPOO TOPICAL DAILY
Qty: 90 CAPSULE | Refills: 0 | Status: SHIPPED | OUTPATIENT
Start: 2024-07-01

## 2024-07-01 NOTE — TELEPHONE ENCOUNTER
"Request for medication refill:  CERTAVITE/ANTIOXIDANTS tablet     Omega-3 Fatty Acids (EQL FISH OIL) 1000 MG CAPS     senna-docusate (SENEXON-S) 8.6-50 MG tablet     Providers if patient needs an appointment and you are willing to give a one month supply please refill for one month and  send a letter/MyChart using \".SMILLIMITEDREFILL\" .smillimited and route chart to \"P SMI \" (Giving one month refill in non controlled medications is strongly recommended before denial)    If refill has been denied, meaning absolutely no refills without visit, please complete the smart phrase \".smirxrefuse\" and route it to the \"P SMI MED REFILLS\"  pool to inform the patient and the pharmacy.    Margi Noel CMA      "

## 2024-07-02 ENCOUNTER — PRE VISIT (OUTPATIENT)
Dept: OPHTHALMOLOGY | Facility: CLINIC | Age: 74
End: 2024-07-02

## 2024-07-11 ENCOUNTER — ANTICOAGULATION THERAPY VISIT (OUTPATIENT)
Dept: ANTICOAGULATION | Facility: CLINIC | Age: 74
End: 2024-07-11

## 2024-07-11 ENCOUNTER — OFFICE VISIT (OUTPATIENT)
Dept: FAMILY MEDICINE | Facility: CLINIC | Age: 74
End: 2024-07-11
Payer: COMMERCIAL

## 2024-07-11 VITALS
BODY MASS INDEX: 44.1 KG/M2 | OXYGEN SATURATION: 97 % | HEART RATE: 91 BPM | WEIGHT: 315 LBS | TEMPERATURE: 98.7 F | SYSTOLIC BLOOD PRESSURE: 139 MMHG | HEIGHT: 71 IN | DIASTOLIC BLOOD PRESSURE: 67 MMHG | RESPIRATION RATE: 16 BRPM

## 2024-07-11 DIAGNOSIS — Z29.11 NEED FOR VACCINATION AGAINST RESPIRATORY SYNCYTIAL VIRUS: ICD-10-CM

## 2024-07-11 DIAGNOSIS — E11.65 TYPE 2 DIABETES MELLITUS WITH HYPERGLYCEMIA, WITHOUT LONG-TERM CURRENT USE OF INSULIN (H): ICD-10-CM

## 2024-07-11 DIAGNOSIS — Z53.9 DIAGNOSIS NOT YET DEFINED: ICD-10-CM

## 2024-07-11 DIAGNOSIS — L98.9 SKIN LESION: ICD-10-CM

## 2024-07-11 DIAGNOSIS — I48.20 CHRONIC ATRIAL FIBRILLATION (H): Primary | ICD-10-CM

## 2024-07-11 DIAGNOSIS — Z79.01 LONG TERM (CURRENT) USE OF ANTICOAGULANTS: ICD-10-CM

## 2024-07-11 DIAGNOSIS — N18.31 STAGE 3A CHRONIC KIDNEY DISEASE (H): ICD-10-CM

## 2024-07-11 DIAGNOSIS — I48.0 PAROXYSMAL ATRIAL FIBRILLATION (H): ICD-10-CM

## 2024-07-11 LAB
ANION GAP SERPL CALCULATED.3IONS-SCNC: 11 MMOL/L (ref 7–15)
BUN SERPL-MCNC: 15.6 MG/DL (ref 8–23)
CALCIUM SERPL-MCNC: 9.7 MG/DL (ref 8.8–10.2)
CHLORIDE SERPL-SCNC: 100 MMOL/L (ref 98–107)
CREAT SERPL-MCNC: 1.31 MG/DL (ref 0.67–1.17)
DEPRECATED HCO3 PLAS-SCNC: 28 MMOL/L (ref 22–29)
EGFRCR SERPLBLD CKD-EPI 2021: 57 ML/MIN/1.73M2
GLUCOSE SERPL-MCNC: 144 MG/DL (ref 70–99)
HBA1C MFR BLD: 6.6 % (ref 0–5.6)
INR PPP: 2.39 (ref 0.85–1.15)
POTASSIUM SERPL-SCNC: 4.8 MMOL/L (ref 3.4–5.3)
SODIUM SERPL-SCNC: 139 MMOL/L (ref 135–145)

## 2024-07-11 PROCEDURE — 99000 SPECIMEN HANDLING OFFICE-LAB: CPT | Performed by: FAMILY MEDICINE

## 2024-07-11 PROCEDURE — 88305 TISSUE EXAM BY PATHOLOGIST: CPT | Performed by: DERMATOLOGY

## 2024-07-11 PROCEDURE — 85610 PROTHROMBIN TIME: CPT | Performed by: FAMILY MEDICINE

## 2024-07-11 PROCEDURE — 11402 EXC TR-EXT B9+MARG 1.1-2 CM: CPT | Performed by: FAMILY MEDICINE

## 2024-07-11 PROCEDURE — 80048 BASIC METABOLIC PNL TOTAL CA: CPT | Performed by: FAMILY MEDICINE

## 2024-07-11 PROCEDURE — 36415 COLL VENOUS BLD VENIPUNCTURE: CPT | Performed by: FAMILY MEDICINE

## 2024-07-11 PROCEDURE — 83036 HEMOGLOBIN GLYCOSYLATED A1C: CPT | Performed by: FAMILY MEDICINE

## 2024-07-11 ASSESSMENT — PATIENT HEALTH QUESTIONNAIRE - PHQ9
10. IF YOU CHECKED OFF ANY PROBLEMS, HOW DIFFICULT HAVE THESE PROBLEMS MADE IT FOR YOU TO DO YOUR WORK, TAKE CARE OF THINGS AT HOME, OR GET ALONG WITH OTHER PEOPLE: NOT DIFFICULT AT ALL
SUM OF ALL RESPONSES TO PHQ QUESTIONS 1-9: 0
SUM OF ALL RESPONSES TO PHQ QUESTIONS 1-9: 0

## 2024-07-11 NOTE — PROGRESS NOTES
ANTICOAGULATION MANAGEMENT     Clarke Moreira 74 year old male is on warfarin with therapeutic INR result. (Goal INR 2.0-3.0)    Recent labs: (last 7 days)     07/11/24  1037   INR 2.39*       ASSESSMENT     Source(s): Chart Reviewed    Medication/supplement changes: None noted  New illness, injury, or hospitalization: Yes:    OV today to remove enlarging skin lesion on left upper arm. Lesion was large and recommended Dermatology appt,  However, patient decline referral and was at the clinic now and requested to have it removed.  Lesion was exciised and was sutured: (8).   He was given post wound instruction cares.  Previous result: Subtherapeutic at 1.9 on 5/17/24.  Additional findings:  appointment to have 8 sutures removed on 7/25/24.  Will recheck INR again to ensure INR stability.       PLAN     Recommended plan for temporary change(s) affecting INR     Dosing Instructions: Continue your current warfarin dose with next INR in 1-2 weeks       Summary  As of 7/11/2024      Full warfarin instructions:  10 mg every day   Next INR check:  8/1/2024               VM not activating to leave message for Clarke with dosing instructions and follow up date.    - will send [employer] Message.       Check at provider office visit - INR on 7/25/24 at his suture removal appt @ [facility]    Education provided: Please call back if any changes to your diet, medications or how you've been taking warfarin  Taking warfarin: Importance of taking warfarin as instructed  Goal range and lab monitoring: goal range and significance of current result    Plan made per [facility] anticoagulation protocol    Mirna Mcknight RN  Anticoagulation Clinic  7/11/2024    _______________________________________________________________________     Anticoagulation Episode Summary       Current INR goal:  2.0-3.0   TTR:  69.0% (8 mo)   Target end date:  Indefinite   Send INR reminders to:  [facility]    Indications    Chronic atrial fibrillation  (H) [I48.20]  Long term (current) use of anticoagulants [Z79.01]  Paroxysmal atrial fibrillation (H) [I48.0]             Comments:  Home Care             Anticoagulation Care Providers       Provider Role Specialty Phone number    Matthias Sanchez MD Referring Family Medicine [phone]    Farideh Dasilva MD Referring Family Medicine [phone]

## 2024-07-11 NOTE — PROGRESS NOTES
SUBJECTIVE:  74 year old male presents for lesion removal. This lesion has been present for enlarging skin lesion on left upper arm.  Says it is fragile and can bleed easily. Keeps it bandaged.  Went over concerns for doing procedure: large size, uncertain INR and was not told to hold coumidan 48 hours for procedure.  Too large for punch or shave biopsy.  Asked patient if he would be willing to wait and see dermatology but he said he was here now and wanted it off.  Talked about doing an excision and that it would be more challenging but likely could control bleeding and was probably the only option for a lesion of this size.  Patient stated he would like procede and written consent signed.    OBJECTIVE:  Left upper arm (posterior above elbow) there is a round lesion approximately 1.5 cm in diameter, around 5 mm in elevation, friable appearing lesion.    ASSESSMENT:  Enlarging lesion concerning for SCC vs. BCC.      PLAN:  After informed consent was obtained, using Betadine for cleansing and 2% Lidocaine  with epinephrine for anesthetic around 7 cc, with sterile technique elliptical excision in total was performed.  The wound is sutured with Ethilon (around 8 inturrupted sutures).  Skin brought close together but not totally approximated with a gap of around 2 mm. Hemostasis was achieved. Appoximate blood loss around 50 ml.   Antibiotic pressure dressing is applied, and wound care instructions provided.  Be alert for any signs of cutaneous infection.  The specimen is labeled and sent to pathology for evaluation.  The procedure was well tolerated without complications.Patient instructed to follow up in 14 days for suture removal.  Wound Care Instructions    1.  Keep area dry today.    2.  Starting tomorrow wash gently with soap and water once daily.      3.  Apply Vaseline or antibiotic ointment to the area and cover with a bandage.  Do not let it dry out and form a scab as this will make the resulting scar more  noticeable.    4. Protect the area from sun for up to one year afterward as the scar is continuing to remodel.  Sun exposure will also make the resulting scar more noticeable.    5.  Call if the area is very red, tender, has a discharge or is very itchy while healing, or if you have any other questions.  These may be signs of early infection or allergy.

## 2024-07-11 NOTE — PATIENT INSTRUCTIONS
Wound Care Instructions    1.  Keep area dry today.    2.  Starting tomorrow wash gently with soap and water once daily.      3.  Apply Vaseline or antibiotic ointment to the area and cover with a bandage.  Do not let it dry out and form a scab as this will make the resulting scar more noticeable.    4. Protect the area from sun for up to one year afterward as the scar is continuing to remodel.  Sun exposure will also make the resulting scar more noticeable.    5.  Call if the area is very red, tender, has a discharge or is very itchy while healing, or if you have any other questions.  These may be signs of early infection or allergy.

## 2024-07-15 LAB
PATH REPORT.COMMENTS IMP SPEC: ABNORMAL
PATH REPORT.COMMENTS IMP SPEC: ABNORMAL
PATH REPORT.COMMENTS IMP SPEC: YES
PATH REPORT.FINAL DX SPEC: ABNORMAL
PATH REPORT.GROSS SPEC: ABNORMAL
PATH REPORT.MICROSCOPIC SPEC OTHER STN: ABNORMAL
PATH REPORT.RELEVANT HX SPEC: ABNORMAL

## 2024-07-17 ENCOUNTER — VIRTUAL VISIT (OUTPATIENT)
Dept: PSYCHOLOGY | Facility: CLINIC | Age: 74
End: 2024-07-17
Payer: COMMERCIAL

## 2024-07-17 DIAGNOSIS — F41.1 GAD (GENERALIZED ANXIETY DISORDER): ICD-10-CM

## 2024-07-17 DIAGNOSIS — F33.1 MODERATE EPISODE OF RECURRENT MAJOR DEPRESSIVE DISORDER (H): ICD-10-CM

## 2024-07-17 DIAGNOSIS — F34.1 PERSISTENT DEPRESSIVE DISORDER: Primary | ICD-10-CM

## 2024-07-17 PROCEDURE — 90834 PSYTX W PT 45 MINUTES: CPT | Mod: 93 | Performed by: PSYCHOLOGIST

## 2024-07-17 NOTE — PROGRESS NOTES
Telemedicine Visit: The patient's condition can be safely assessed and treated via audio encounter.      Reason for Telemedicine Visit: Patient has requested telehealth visit and Patient unable to travel    Originating Site (Patient Location): Patient's home    Distant Location (provider location):  On-site    Consent:  The patient/guardian has verbally consented to: the potential risks and benefits of telemedicine (video visit) versus in person care; bill my insurance or make self-payment for services provided; and responsibility for payment of non-covered services.     Mode of Communication:  Telephone call via [employer]ximity    As the provider I attest to compliance with applicable laws and regulations related to telemedicine.    Behavioral Health Progress Note    Client's Legal Name: Clarke Moreira    Client's Preferred Name: Clarke  YOB: 1950  Type of Service: telephone  Length of Service:   Start time: 8:34  End time:  9:23  Duration: 49 minutes  Attendees: patient    Identifying Information and Presenting Problem:  Clarke is a 74 year old male who is being seen for problematic symptoms of depression, low self-worth, and negative view of himself resulting from childhood trauma and other subsequent life circumstances.    Treatment Objective(s) Addressed in This Session:  Goal 1: Clarke will learn/use coping skills to continue to reduce intensity/frequency of suicidal thoughts     Goal 2: Clarke will challenge/reframe negative harsh thoughts that he has about himself.     Progress on / Status of Treatment Objective(s) / Homework:  Stable, no suicidal thoughts reported       Mental Health Screening Questionnaires:  PHQ-9:       9/13/2023     9:19 AM 3/15/2024     9:48 AM 7/11/2024     9:37 AM   PHQ   PHQ-9 Total Score 24 19 0   Q9: Thoughts of better off dead/self-harm past 2 weeks Nearly every day Nearly every day Not at all   F/U: Thoughts of suicide or self-harm Yes Yes    F/U: Self harm-plan  "No No    F/U: Self-harm action No No    F/U: Safety concerns No No       CHRIS-7:       12/11/2018    10:31 AM 5/10/2019     8:26 AM 9/3/2019    10:35 AM   CHRIS-7 SCORE   Total Score 20 13 19     Topics Discussed/Interventions Provided:  Session focus was focused on narrative that Clarke has around \"I can't\" and his deficits and how this impedes his ability to see the full story.  Discussed flexible vs rigid thinking.  Used an example he provided to highlight pieces of the story that demonstrated positive things he brought to the interaction to challenge rigid thinking.  Cristino shared another incident where he wished he would have responded with something other than irritation and anger to a person at clinic. Identified what values are important to him in terms of other people - connection/showing empathy/acknowledging their humaness. Explored ways he can start to create pathways to make a different response more likely in these situations where irritation and negative feelings are heightened. Discussed reflecting after an event and thinking about any physical flags or other behaviors/sensations that come up that let him know irritation is increasing, so that there is the opportunity for a brief pause so he can be more deliberate in how he responds.  Using values as the grounding in how he responds to follks.  When watching programs on tv and noticing these feelings, can practice these skills.     Mental Status Exam:  Clarke was cooperative, open, easy to engage with, and pleasant.  He was alert and oriented to all spheres . Since this is a phone visit, I can't provide information on visual aspects of the mental status exam.  Clarke's speech is his usual rate and rhythm.  Mood is described as anxious - all over the place \"there are [id number] things going on in my head right now\".  Affect was appropriate and mood congruent. Thought processes are logical and coherent.  Thought content contained no evidence of psychotic " features and no evidence of suicide, homicide, or nonsuicidal self-injury related concerns. Memory appeared grossly intact with no formal evaluation. Insight is good. Judgment is good. Patient exhibited good impulse control during the appointment.     Does the patient appear to be at imminent risk of harm to self/others at this time? No    The session was necessary to address profoundly negative self view that contributes to anxiety and persistent depressive symptoms.  Anxiety and depressive symptoms significantly interfere with his ability to function in areas related to interacting and relating with others, social relationships, maintaining personal health and physical well-being, day to day self care or health behaviors, participating in meaningful activities and instrumental activities of daily living (IADLs). Ongoing psychotherapy is necessary to stabilize mood, provide counseling, improve functioning with daily activities and provide support.    DSM-5-TR Diagnoses:  Persistent Depressive Disorder  Major Depression, recurrent, moderate  Generalized Anxiety Disorder    Recommendations & Plan:   Next appt scheduled for 8/1  Practice on tv - when notice anger how to respond    Lety Buenrostro PsyD, JIN    NOTE: Treatment plan update due 4/1/25.  Diagnostic assessment update due 4/1/25

## 2024-07-29 DIAGNOSIS — Z79.01 WARFARIN ANTICOAGULATION: Primary | ICD-10-CM

## 2024-07-31 ENCOUNTER — ANTICOAGULATION THERAPY VISIT (OUTPATIENT)
Dept: ANTICOAGULATION | Facility: CLINIC | Age: 74
End: 2024-07-31

## 2024-07-31 ENCOUNTER — OFFICE VISIT (OUTPATIENT)
Dept: FAMILY MEDICINE | Facility: CLINIC | Age: 74
End: 2024-07-31
Payer: COMMERCIAL

## 2024-07-31 VITALS
HEART RATE: 106 BPM | RESPIRATION RATE: 18 BRPM | TEMPERATURE: 99.8 F | DIASTOLIC BLOOD PRESSURE: 75 MMHG | HEIGHT: 71 IN | BODY MASS INDEX: 44.1 KG/M2 | SYSTOLIC BLOOD PRESSURE: 139 MMHG | WEIGHT: 315 LBS | OXYGEN SATURATION: 95 %

## 2024-07-31 DIAGNOSIS — Z29.11 NEED FOR VACCINATION AGAINST RESPIRATORY SYNCYTIAL VIRUS: ICD-10-CM

## 2024-07-31 DIAGNOSIS — I48.0 PAROXYSMAL ATRIAL FIBRILLATION (H): ICD-10-CM

## 2024-07-31 DIAGNOSIS — Z79.01 LONG TERM (CURRENT) USE OF ANTICOAGULANTS: ICD-10-CM

## 2024-07-31 DIAGNOSIS — Z79.01 WARFARIN ANTICOAGULATION: ICD-10-CM

## 2024-07-31 DIAGNOSIS — I48.20 CHRONIC ATRIAL FIBRILLATION (H): Primary | ICD-10-CM

## 2024-07-31 DIAGNOSIS — I48.20 CHRONIC ATRIAL FIBRILLATION (H): ICD-10-CM

## 2024-07-31 DIAGNOSIS — Z48.01 ENCOUNTER FOR CHANGE OR REMOVAL OF SURGICAL WOUND DRESSING: Primary | ICD-10-CM

## 2024-07-31 LAB — INR BLD: 2.6 (ref 0.9–1.1)

## 2024-07-31 PROCEDURE — 99213 OFFICE O/P EST LOW 20 MIN: CPT | Mod: GC

## 2024-07-31 PROCEDURE — 85610 PROTHROMBIN TIME: CPT

## 2024-07-31 PROCEDURE — 99207 PR NO CHARGE LOS: CPT

## 2024-07-31 PROCEDURE — 36416 COLLJ CAPILLARY BLOOD SPEC: CPT

## 2024-07-31 NOTE — PROGRESS NOTES
Preceptor Attestation:   Patient seen, evaluated and discussed with the resident. I have verified the content of the note, which accurately reflects my assessment of the patient and the plan of care. I was present for entire suture removal.   Supervising Physician:  Gumaro Latif MD

## 2024-07-31 NOTE — PROGRESS NOTES
ANTICOAGULATION MANAGEMENT     Clarke Moreira 74 year old male is on warfarin with therapeutic INR result. (Goal INR 2.0-3.0)    Recent labs: (last 7 days)     07/31/24  1016   INR 2.6*       ASSESSMENT     Source(s): Chart Review  Previous INR was Therapeutic last visit; previously outside of goal range  Medication, diet, health changes since last INR had sutures removed today, no concerns with wound.          PLAN     Recommended plan for no diet, medication or health factor changes affecting INR     Dosing Instructions: Continue your current warfarin dose with next INR in 4 weeks       Summary  As of 7/31/2024      Full warfarin instructions:  10 mg every day   Next INR check:  8/28/2024               Detailed voice message left for Clarke with dosing instructions and follow up date.     Contact [phone] to schedule and with any changes, questions or concerns.     Education provided: Please call back if any changes to your diet, medications or how you've been taking warfarin    Plan made per [facility] anticoagulation protocol    Robinson Lara RN  Anticoagulation Clinic  7/31/2024    _______________________________________________________________________     Anticoagulation Episode Summary       Current INR goal:  2.0-3.0   TTR:  69.0% (8 mo)   Target end date:  Indefinite   Send INR reminders to:  [facility]    Indications    Chronic atrial fibrillation (H) [I48.20]  Long term (current) use of anticoagulants [Z79.01]  Paroxysmal atrial fibrillation (H) [I48.0]             Comments:  Home Care             Anticoagulation Care Providers       Provider Role Specialty Phone number    Matthias Sanchez MD Referring Family Medicine [phone]    Farideh Dasilva MD Referring Family Medicine [phone]

## 2024-07-31 NOTE — PROGRESS NOTES
"  Assessment & Plan     Encounter for change or removal of surgical wound dressing  8 sutures placed for skin closure following skin lesion removal at [facility] 20 days ago. Today, wound healing well without signs of infection. All sutures successfully removed without bleeding or other complications. Gauze dressing with antibiotic ointment applied to wound to help protect site. Return precautions provided. May continue to follow up with [facility] as needed.  - Suture Removal No Charge    Warfarin anticoagulation  Chronic atrial fibrillation (H)  Long term (current) use of anticoagulants  Paroxysmal atrial fibrillation (H)  Chronic, stable condition. INR subtherapeutic on last check (1.9 on 7/11/2024, goal 2-3). Rechecked today (2.6). Follows with [facility] clinic for management. Does not have follow up appointment scheduled. He will reach out to them after today's visit to schedule.  - INR point of care    Need for vaccination against respiratory syncytial virus  Patient due for routine immunization. Patient declined to proceed with immunizations today.      Return if symptoms worsen or fail to improve.    Lyndsay Mir is a 74 year old, presenting for the following health issues:  Follow Up (Suture removal)    HPI   Patient here for suture removal, INR check.    Sutures placed 7/11/2024 at [facility] clinic (20 days ago). Had removal of suspicious skin lesion on left upper arm that was found to be basal cell carcinoma. Clear margins. Here for suture removal today. No concerns with wound since that time.    On chronic warfarin. INR subtherapeutic at 1.9 on 7/11/2024. Goal 2-3. Plan to recheck today. Follows with [facility] Antico[facility] clinic.        Objective    /75   Pulse 106   Temp 99.8  F (37.7  C) (Oral)   Resp 18   Ht 1.803 m (5' 11\")   Wt (!) 181 kg (399 lb)   SpO2 95%   BMI 55.65 kg/m    Body mass index is 55.65 kg/m .  Physical Exam  Vitals reviewed. "   Constitutional:       General: He is not in acute distress.     Appearance: Normal appearance.   HENT:      Head: Normocephalic and atraumatic.   Pulmonary:      Effort: Pulmonary effort is normal. No respiratory distress.   Skin:     General: Skin is warm and dry.      Comments: Left distal triceps surgery site with appropriate dressing. Wound appears to be healing well without any signs of infection. Well approximated. Some superficial scabbing present. All 8 sutures visualized.   Neurological:      Mental Status: He is alert. Mental status is at baseline.   Psychiatric:         Mood and Affect: Mood normal.         Behavior: Behavior normal.         Thought Content: Thought content normal.         Judgment: Judgment normal.          INR today: 2.6    Signed Electronically by: Nikhil Wong DO

## 2024-08-01 ENCOUNTER — VIRTUAL VISIT (OUTPATIENT)
Dept: PSYCHOLOGY | Facility: CLINIC | Age: 74
End: 2024-08-01
Payer: COMMERCIAL

## 2024-08-01 DIAGNOSIS — F33.1 MODERATE EPISODE OF RECURRENT MAJOR DEPRESSIVE DISORDER (H): ICD-10-CM

## 2024-08-01 DIAGNOSIS — F41.1 GAD (GENERALIZED ANXIETY DISORDER): ICD-10-CM

## 2024-08-01 DIAGNOSIS — F34.1 PERSISTENT DEPRESSIVE DISORDER: Primary | ICD-10-CM

## 2024-08-01 PROCEDURE — 90834 PSYTX W PT 45 MINUTES: CPT | Mod: 93 | Performed by: PSYCHOLOGIST

## 2024-08-01 NOTE — PROGRESS NOTES
Telemedicine Visit: The patient's condition can be safely assessed and treated via an audio only encounter.      Reason for Telemedicine Visit: Patient has requested telehealth visit and Patient unable to travel    Originating Site (Patient Location): Patient's home    Distant Location (provider location):  On-site    Consent:  The patient/guardian has verbally consented to: the potential risks and benefits of telemedicine (video visit) versus in person care; bill my insurance or make self-payment for services provided; and responsibility for payment of non-covered services.     Mode of Communication:  Telephone call via [employer]    As the provider I attest to compliance with applicable laws and regulations related to telemedicine.    [facility] Primary Care: : Integrated Behavioral Health  August 1, 2024      Behavioral Health Clinician Progress Note    Patient Name: Clarke Moreira           Service Type:  Individual      Service Location:   Phone call (patient / identified key support person reached)     Session Start Time: 8:19  Session End Time: 9:09      Session Length: 38 - 52      Attendees: Client     Service Modality:  Phone Visit:    Visit Activities (Refresh list every visit): [facility] Only    Diagnostic Assessment Date: last one was 4/1/25  Treatment Plan Review Date: last one was 4/1/25  See Flowsheets for today's PHQ-9 and CHRIS-7 results  Previous PHQ-9:       9/13/2023     9:19 AM 3/15/2024     9:48 AM 7/11/2024     9:37 AM   PHQ-9 SCORE   PHQ-9 Total Score MyChart 24 (Severe depression) 19 (Moderately severe depression) 0   PHQ-9 Total Score 24 19 0     Previous CHRIS-7:       12/11/2018    10:31 AM 5/10/2019     8:26 AM 9/3/2019    10:35 AM   CHRIS-7 SCORE   Total Score 20 13 19     Treatment Objective(s) Addressed in This Session:  Goal 1: Clarke will learn/use coping skills to continue to reduce intensity/frequency of suicidal thoughts     Goal 2: Clarke will challenge/reframe  "negative harsh thoughts that he has about himself.     Current Stressors / Issues:  Clarke shared two situations that occurred - one related to getting [facility] CPAP [facility] and the other related to his appointment for stitches removal that happened yesterday.  Clarke has been working on identifying signs of increasing irritability and anger so that he can respond more intentionally and thoughtfully when he is feeling this way.  In both of these situations he describes feeling very irritable which resulted in \"snippy\" and sarcastic comments towards those that are trying to help.  Explored his thoughts on what may be the purpose of responding in this way int hese situations.  Clarke states when he thinks about it he has the memory of being in his mom's house and not having anyone there to explain to him what was happening between his mom and him.  Feeling like he has no control, helpless.  Explored how the anger and the resulting behaviors may be a way of trying to take back control of these situations when he is feeling more vulnerable (as he always feels when interacting with the health care system).     Interventions:  CBT  Challenging Negative cognitions     Teach emotional recognition/identification     Emotional Regulation skills and tips       Problem-Solving   Communiation skills     Patients Response to treatment Interventions.  Patient engaged  Patient motivated    Progress on Treatment Objective(s) / Homework:  Stable, no suicidal ideation    Medication Review:  No current psychiatric medications prescribed    Medication Compliance:  NA    Changes in Health Issues:   None reported    Chemical Use Review:   Substance Use: Chemical use reviewed, no active concerns identified      Tobacco Use: No current tobacco use.      Assessment: Current Emotional / Mental Status (status of significant symptoms):  Risk status (Self / Other harm or suicidal " ideation)    Patient denies current fears or concerns for personal safety.  Patient denies current or recent suicidal ideation or behaviors.  Patient denies current or recent homicidal ideation or behaviors.  Patient denies current or recent self injurious behavior or ideation.  Patient denies other safety concerns.      Appearance:   N/a phone visit   Eye Contact:   N/a phone visit   Psychomotor Behavior: N/a phone visit   Attitude:   Cooperative  Interested Friendly Pleasant  Orientation:   All  Speech   Rate / Production: Normal    Volume:  Normal   Mood:    Irritable   Affect:    Appropriate   Thought Content:  Clear   Thought Form:  Coherent  Logical   Insight:    Good     Diagnoses:  Persistent Depressive Disorder  Major Depression, recurrent, moderate  Generalized Anxiety Disorder      Recommendations & Plan:   Next appt scheduled for 8/22      Lety Buenrostro PsyD

## 2024-09-04 ENCOUNTER — TELEPHONE (OUTPATIENT)
Dept: ANTICOAGULATION | Facility: CLINIC | Age: 74
End: 2024-09-04
Payer: COMMERCIAL

## 2024-09-04 NOTE — TELEPHONE ENCOUNTER
ANTICOAGULATION     Clarke Moreira is overdue for an INR check.     Left message for patient to call and schedule lab appointment as soon as possible. If returning call, please schedule.     Mirna Mcknight RN

## 2024-09-09 ENCOUNTER — VIRTUAL VISIT (OUTPATIENT)
Dept: PSYCHOLOGY | Facility: CLINIC | Age: 74
End: 2024-09-09
Payer: COMMERCIAL

## 2024-09-09 ENCOUNTER — TELEPHONE (OUTPATIENT)
Dept: FAMILY MEDICINE | Facility: CLINIC | Age: 74
End: 2024-09-09
Payer: COMMERCIAL

## 2024-09-09 DIAGNOSIS — F34.1 PERSISTENT DEPRESSIVE DISORDER: Primary | ICD-10-CM

## 2024-09-09 DIAGNOSIS — F33.1 MODERATE EPISODE OF RECURRENT MAJOR DEPRESSIVE DISORDER (H): ICD-10-CM

## 2024-09-09 DIAGNOSIS — F41.1 GAD (GENERALIZED ANXIETY DISORDER): ICD-10-CM

## 2024-09-09 PROCEDURE — 90834 PSYTX W PT 45 MINUTES: CPT | Mod: 93 | Performed by: PSYCHOLOGIST

## 2024-09-09 NOTE — PROGRESS NOTES
Telemedicine Visit: The patient's condition can be safely assessed and treated via an audio only encounter.      Reason for Telemedicine Visit: Patient has requested telehealth visit and Patient unable to travel    Originating Site (Patient Location): Patient's home    Distant Location (provider location):  On-site    Consent:  The patient/guardian has verbally consented to: the potential risks and benefits of telemedicine (video visit) versus in person care; bill my insurance or make self-payment for services provided; and responsibility for payment of non-covered services.     Mode of Communication:  Telephone call via [employer]    As the provider I attest to compliance with applicable laws and regulations related to telemedicine.    [facility] Primary Care: : Integrated Behavioral Health  September 9, 2024    Behavioral Health Clinician Progress Note    Patient Name: Clarke Moreira           Service Type:  Individual      Service Location:   Phone call (patient / identified key support person reached)     Session Start Time: 8:23  Session End Time: 9:07      Session Length: 38 - 52      Attendees: Client     Service Modality:  Phone Visit    Visit Activities (Refresh list every visit): [facility] Only    Diagnostic Assessment Date: last one was 4/1/25  Treatment Plan Review Date: last one was 4/1/25  See Flowsheets for today's PHQ-9 and CHRIS-7 results  Previous PHQ-9:       9/13/2023     9:19 AM 3/15/2024     9:48 AM 7/11/2024     9:37 AM   PHQ-9 SCORE   PHQ-9 Total Score MyChart 24 (Severe depression) 19 (Moderately severe depression) 0   PHQ-9 Total Score 24 19 0     Previous CHRIS-7:       12/11/2018    10:31 AM 5/10/2019     8:26 AM 9/3/2019    10:35 AM   CHRIS-7 SCORE   Total Score 20 13 19     Treatment Objective(s) Addressed in This Session:  Goal 1: Clarke will learn/use coping skills to continue to reduce intensity/frequency of suicidal thoughts     Goal 2: Clarke will challenge/reframe  "negative harsh thoughts that he has about himself.     Current Stressors / Issues:  Today's session focused on Clarke's concerns about his \"inability to act and to take care\" of himself.  He describes a couple of situation that he has been problem solving, but there have been many barriers and this leads to him \"giving up.\"  When he does this, he feels helpless and hapless and this increases the intensity of emotions he has around the situation which leads him to be even more paralyzed and avoidant.  Using motivational interviewing, we explored pros/cons of action, the ambivalence around this situation, setting a next step goal, and identified possible barriers to achieving the goal.      Patients Response to treatment Interventions.  Patient engaged  Patient motivated    Progress on Treatment Objective(s) / Homework:  Stable, no suicidal ideation reproted      Medication Review:  No current psychiatric medications prescribed    Medication Compliance:  NA    Changes in Health Issues:   None reported    Chemical Use Review:   Substance Use: Chemical use reviewed, no active concerns identified      Tobacco Use: No current tobacco use.      Assessment: Current Emotional / Mental Status (status of significant symptoms):  Risk status (Self / Other harm or suicidal ideation)    Patient denies current fears or concerns for personal safety.  Patient denies current or recent suicidal ideation or behaviors.  Patient denies current or recent homicidal ideation or behaviors.  Patient denies current or recent self injurious behavior or ideation.  Patient denies other safety concerns.      Appearance:   N/a phone visit   Eye Contact:   N/a phone visit   Psychomotor Behavior: N/a phone visit   Attitude:   Interested Friendly Attentive  Orientation:   All  Speech   Rate / Production: Normal    Volume:  Normal   Mood:    Sad   Affect:    Appropriate   Thought Content:  Clear   Thought Form:  Coherent  Logical   Insight:    Good "     Diagnoses:  Persistent Depressive Disorder  Major Depression, recurrent, moderate  Generalized Anxiety Disorder      Recommendations & Plan:   Next appt scheduled for 9/19  Need to talk about doing an in person appt at our next appt  Will ask Homar to call Clarke to discuss county benefits      Lety Buenrostro PsyD

## 2024-09-09 NOTE — Clinical Note
Luis Graf - Could you give Ryley call please? He is needing to get some paperwork done to get his county benefits renewed and has been struggling to do so.  Thank you, Lety

## 2024-09-09 NOTE — TELEPHONE ENCOUNTER
called patient this morning to follow up on his reports of needing assistance with renewing his county benefits.     Patient states he is looking to schedule an appointment with SW to get assistance with completing renewal paperwork for his benefits. Patient is still waiting to receive his proofs of expenses, though once he receives them, will call SW to make an appointment.    SW provided patient with direct number and asked that patient leave a voicemail is SW is unable to answer.    Patient in agreement with this plan.    YAAKOV Hernandez

## 2024-09-11 ENCOUNTER — MYC MEDICAL ADVICE (OUTPATIENT)
Dept: ANTICOAGULATION | Facility: CLINIC | Age: 74
End: 2024-09-11

## 2024-09-11 ENCOUNTER — DOCUMENTATION ONLY (OUTPATIENT)
Dept: FAMILY MEDICINE | Facility: CLINIC | Age: 74
End: 2024-09-11

## 2024-09-11 DIAGNOSIS — I50.22 CHRONIC SYSTOLIC CONGESTIVE HEART FAILURE (H): Primary | ICD-10-CM

## 2024-09-17 ENCOUNTER — TELEPHONE (OUTPATIENT)
Dept: FAMILY MEDICINE | Facility: CLINIC | Age: 74
End: 2024-09-17
Payer: COMMERCIAL

## 2024-09-17 NOTE — TELEPHONE ENCOUNTER
received phone call from patient stating he is ready to come to the clinic to meet with  to complete the application for his county benefits.     Patient scheduled to meet with SW on 9/24/24 at 10:30am.    YAAKOV Hernandez

## 2024-09-19 ENCOUNTER — VIRTUAL VISIT (OUTPATIENT)
Dept: PSYCHOLOGY | Facility: CLINIC | Age: 74
End: 2024-09-19
Payer: COMMERCIAL

## 2024-09-19 ENCOUNTER — NURSE TRIAGE (OUTPATIENT)
Dept: FAMILY MEDICINE | Facility: CLINIC | Age: 74
End: 2024-09-19
Payer: COMMERCIAL

## 2024-09-19 DIAGNOSIS — F34.1 PERSISTENT DEPRESSIVE DISORDER: Primary | ICD-10-CM

## 2024-09-19 DIAGNOSIS — F33.1 MODERATE EPISODE OF RECURRENT MAJOR DEPRESSIVE DISORDER (H): ICD-10-CM

## 2024-09-19 DIAGNOSIS — F41.1 GAD (GENERALIZED ANXIETY DISORDER): ICD-10-CM

## 2024-09-19 PROCEDURE — 90832 PSYTX W PT 30 MINUTES: CPT | Mod: 93 | Performed by: PSYCHOLOGIST

## 2024-09-19 NOTE — TELEPHONE ENCOUNTER
Reason for Disposition   MILD weakness (i.e., does not interfere with ability to work, go to school, normal activities) and persists > 1 week     Patient has been having ongoing weakness for past month or so Thinks it is related to the heat. Gets dizzy when going from sitting to standing. Legs hurt when first standing but that is normal for him. Once he starts walking pain goes away. States lymphedema is being properly managed.    Additional Information   Negative: SEVERE difficulty breathing (e.g., struggling for each breath, speaks in single words)   Negative: Shock suspected (e.g., cold/pale/clammy skin, too weak to stand, low BP, rapid pulse)   Negative: Difficult to awaken or acting confused (e.g., disoriented, slurred speech)   Negative: Fainted > 15 minutes ago and still feels too weak or dizzy to stand   Negative: SEVERE weakness (i.e., unable to walk or barely able to walk, requires support) and new-onset or worsening   Negative: Sounds like a life-threatening emergency to the triager   Negative: Weakness of the face, arm or leg on one side of the body   Negative: Has diabetes mellitus and weakness from low blood sugar (i.e., < 60 mg/dL or 3.5 mmol/L)   Negative: Recent heat exposure, suspected cause of weakness   Negative: Vomiting is main symptom   Negative: Diarrhea is main symptom   Negative: Difficulty breathing   Negative: Heart beating < 50 beats per minute OR > 140 beats per minute   Negative: Extra heartbeats, irregular heart beating, or heart is beating very fast (i.e., 'palpitations')   Negative: Follows large amount of bleeding (e.g., from vomiting, rectum, vagina) (Exception: Small transient weakness from sight of a small amount blood.)   Negative: Black or tarry bowel movements   Negative: MODERATE weakness or fatigue from poor fluid intake with no improvement after 2 hours of rest and fluids   Negative: Drinking very little and dehydration suspected (e.g., no urine > 12 hours, very dry mouth,  "very lightheaded)   Negative: Patient sounds very sick or weak to the triager   Negative: MODERATE weakness (i.e., interferes with work, school, normal activities) and cause unknown (Exceptions: Weakness from acute minor illness or from poor fluid intake; weakness is chronic and not worse.)   Negative: Fever > 103 F  (39.4 C) and not able to get the Fever down using CARE ADVICE   Negative: Fever > 100.0 F (37.8 C) and bedridden (e.g., CVA, chronic illness, recovering from surgery)   Negative: Fever > 101 F (38.3 C) and over 60 years of age   Negative: Fever > 100.0 F (37.8 C) and diabetes mellitus or weak immune system (e.g., HIV positive, cancer chemo, splenectomy, organ transplant, chronic steroids)   Negative: Pale skin (pallor)   Negative: MODERATE weakness (i.e., interferes with work, school, normal activities) and persists > 3 days   Negative: Taking a medicine that could cause weakness (e.g., blood pressure medications, diuretics)   Negative: Patient wants to be seen    Answer Assessment - Initial Assessment Questions  1. DESCRIPTION: \"Describe how you are feeling.\"      Weak  2. SEVERITY: \"How bad is it?\"  \"Can you stand and walk?\"    - MILD (0-3): Feels weak or tired, but does not interfere with work, school or normal activities.    - MODERATE (4-7): Able to stand and walk; weakness interferes with work, school, or normal activities.    - SEVERE (8-10): Unable to stand or walk; unable to do usual activities.      Mild to moderate weakness. Able to walk, sometimes afraid to walk but able to support self. More afraid to walk in case I fall. I would not be able to get back up but have not gotten close to falling  3. ONSET: \"When did these symptoms begin?\" (e.g., hours, days, weeks, months)      3-4 or more weeks. When the heat started to .   4. CAUSE: \"What do you think is causing the weakness or fatigue?\" (e.g., not drinking enough fluids, medical problem, trouble sleeping)      The heat that we have " "been having the past several weeks  5. NEW MEDICINES:  \"Have you started on any new medicines recently?\" (e.g., opioid pain medicines, benzodiazepines, muscle relaxants, antidepressants, antihistamines, neuroleptics, beta blockers)      None  6. OTHER SYMPTOMS: \"Do you have any other symptoms?\" (e.g., chest pain, fever, cough, SOB, vomiting, diarrhea, bleeding, other areas of pain)      Dizziness when going from sitting to standing- eating and drinking proper amount of fluids. No other symptoms.    Protocols used: Weakness (Generalized) and Fatigue-A-OH    "

## 2024-09-19 NOTE — PROGRESS NOTES
Telemedicine Visit: The patient's condition can be safely assessed and treated via an audio only encounter.      Reason for Telemedicine Visit: Patient has requested telehealth visit and Patient unable to travel    Originating Site (Patient Location): Patient's home    Distant Location (provider location):  On-site    Consent:  The patient/guardian has verbally consented to: the potential risks and benefits of telemedicine (video visit) versus in person care; bill my insurance or make self-payment for services provided; and responsibility for payment of non-covered services.     Mode of Communication:  Telephone call via [employer]    As the provider I attest to compliance with applicable laws and regulations related to telemedicine.    [facility]: : Integrated Behavioral Health  September 19, 2024    Behavioral Health Clinician Progress Note    Patient Name: Clarke Moreira           Service Type:  Individual      Service Location:   Phone call (patient / identified key support person reached)     Session Start Time: 8:20  Session End Time: 8:43      Session Length: 16 - 37      Attendees: Client     Service Modality:  Phone Visit    Visit Activities (Refresh list every visit): [facility] Only    Diagnostic Assessment Date: last one was 4/1/25  Treatment Plan Review Date: last one was 4/1/25  See Flowsheets for today's PHQ-9 and CHRIS-7 results  Previous PHQ-9:       9/13/2023     9:19 AM 3/15/2024     9:48 AM 7/11/2024     9:37 AM   PHQ-9 SCORE   PHQ-9 Total Score MyChart 24 (Severe depression) 19 (Moderately severe depression) 0   PHQ-9 Total Score 24 19 0     Previous CHRIS-7:       12/11/2018    10:31 AM 5/10/2019     8:26 AM 9/3/2019    10:35 AM   CHRIS-7 SCORE   Total Score 20 13 19     Treatment Objective(s) Addressed in This Session:  Goal 1: Clarke will learn/use coping skills to continue to reduce intensity/frequency of suicidal thoughts     Goal 2: Clarke will  challenge/reframe negative harsh thoughts that he has about himself.     Current Stressors / Issues:  Clarke reports that he has not been feeling very well the last few days.  Feeling very tired and kind of weak.  States he is having a hard time thinking of what he would like to focus on or talk about today which typically isn't a challenge for him.  I was concerned about how Clarke as he did not seem to be his typical self today.  Due to the level of anxiety he has, he can be very avoidant about seeking care.  Problem solved with him some different options that would be available to him to get his symptoms checked out further.  He stated he has an appt with Dr. Shepard soon, but we discussed talking to someone sooner than that. Ultimately, he agreed that I could talk to our triage nurse so she could call him to assess symptoms further with him and develop a plan for next steps.      Mood has been about the same, difficult to discern since he has been feeling unwell.  Was able to connect with [employer] and they set up a time for Clarke to meet with [employer] to get the needed information into the [place] so [place].      Clarke decided he did not feel well enough to continue the visit today.  We ended the visit and I talked to the nurse who reached out to Clarke. Please see her note from this same date.    Patients Response to treatment Interventions.  Patient was engaged in the session    Progress on Treatment Objective(s) / Homework:  Stable, no suicidal ideation reported today although he typically has ongoing chronic suicidal ideation      Medication Review:  No current psychiatric medications prescribed    Medication Compliance:  NA    Changes in Health Issues:   None reported    Chemical Use Review:   Substance Use: No active concerns, has been sober for many decades     Tobacco Use: No current tobacco use.      Assessment: Current Emotional / Mental Status (status of significant  symptoms):  Risk status (Self / Other harm or suicidal ideation)    Patient denies current fears or concerns for personal safety.  Patient denies current or recent suicidal ideation or behaviors.  Patient denies current or recent homicidal ideation or behaviors.  Patient denies current or recent self injurious behavior or ideation.  Patient denies other safety concerns.      Appearance:   N/a phone visit   Eye Contact:   N/a phone visit   Psychomotor Behavior: N/a phone visit   Attitude:   Attentive  Orientation:   All  Speech   Rate / Production: Slow    Volume:  Soft   Mood:    Anxious   Affect:    Subdued   Thought Content:  Clear   Thought Form:  Coherent  Logical   Insight:    Good     Diagnoses:  Persistent Depressive Disorder  Major Depression, recurrent, moderate  Generalized Anxiety Disorder      Recommendations & Plan:   Follow up scheduled for 10/3  Clarke will talk to the nurse regarding symptoms      Lety Buenrostro PsyD

## 2024-09-22 ENCOUNTER — APPOINTMENT (OUTPATIENT)
Dept: GENERAL RADIOLOGY | Facility: CLINIC | Age: 74
End: 2024-09-22
Attending: EMERGENCY MEDICINE
Payer: COMMERCIAL

## 2024-09-22 ENCOUNTER — APPOINTMENT (OUTPATIENT)
Dept: CT IMAGING | Facility: CLINIC | Age: 74
End: 2024-09-22
Attending: EMERGENCY MEDICINE
Payer: COMMERCIAL

## 2024-09-22 ENCOUNTER — APPOINTMENT (OUTPATIENT)
Dept: CT IMAGING | Facility: CLINIC | Age: 74
DRG: 812 | End: 2024-09-22
Attending: EMERGENCY MEDICINE
Payer: COMMERCIAL

## 2024-09-22 ENCOUNTER — HOSPITAL ENCOUNTER (INPATIENT)
Facility: CLINIC | Age: 74
LOS: 11 days | Discharge: NURSING, REHAB OR LONG-TERM CARE | End: 2024-10-03
Attending: EMERGENCY MEDICINE | Admitting: INTERNAL MEDICINE
Payer: COMMERCIAL

## 2024-09-22 ENCOUNTER — APPOINTMENT (OUTPATIENT)
Dept: GENERAL RADIOLOGY | Facility: CLINIC | Age: 74
DRG: 812 | End: 2024-09-22
Attending: EMERGENCY MEDICINE
Payer: COMMERCIAL

## 2024-09-22 DIAGNOSIS — R55 SYNCOPE AND COLLAPSE: ICD-10-CM

## 2024-09-22 DIAGNOSIS — S80.02XA CONTUSION OF LEFT KNEE, INITIAL ENCOUNTER: ICD-10-CM

## 2024-09-22 DIAGNOSIS — R53.81 PHYSICAL DECONDITIONING: ICD-10-CM

## 2024-09-22 DIAGNOSIS — R93.6 ABNORMAL FINDINGS ON DIAGNOSTIC IMAGING OF LIMBS: ICD-10-CM

## 2024-09-22 DIAGNOSIS — R55 PRE-SYNCOPE: Primary | ICD-10-CM
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
## 2024-09-22 DIAGNOSIS — S80.01XA CONTUSION OF RIGHT KNEE, INITIAL ENCOUNTER: ICD-10-CM

## 2024-09-22 DIAGNOSIS — R29.6 FALLS FREQUENTLY: ICD-10-CM

## 2024-09-22 DIAGNOSIS — E11.22 TYPE 2 DIABETES MELLITUS WITH CHRONIC KIDNEY DISEASE, WITHOUT LONG-TERM CURRENT USE OF INSULIN, UNSPECIFIED CKD STAGE (H): ICD-10-CM

## 2024-09-22 DIAGNOSIS — Z86.79 PERSONAL HISTORY OF OTHER DISEASES OF THE CIRCULATORY SYSTEM: ICD-10-CM

## 2024-09-22 DIAGNOSIS — M54.50 LOW BACK PAIN, UNSPECIFIED BACK PAIN LATERALITY, UNSPECIFIED CHRONICITY, UNSPECIFIED WHETHER SCIATICA PRESENT: ICD-10-CM

## 2024-09-22 DIAGNOSIS — N18.31 STAGE 3A CHRONIC KIDNEY DISEASE (H): ICD-10-CM
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
## 2024-09-22 DIAGNOSIS — E83.42 HYPOMAGNESEMIA: ICD-10-CM

## 2024-09-22 DIAGNOSIS — E87.1 HYPONATREMIA: ICD-10-CM

## 2024-09-22 DIAGNOSIS — R53.1 WEAKNESS: ICD-10-CM

## 2024-09-22 DIAGNOSIS — R45.851 SUICIDAL IDEATION: ICD-10-CM

## 2024-09-22 DIAGNOSIS — D64.9 ANEMIA, UNSPECIFIED TYPE: ICD-10-CM

## 2024-09-22 DIAGNOSIS — M54.2 CERVICALGIA: ICD-10-CM

## 2024-09-22 DIAGNOSIS — N17.9 ACUTE KIDNEY INJURY (NONTRAUMATIC) (H): ICD-10-CM

## 2024-09-22 DIAGNOSIS — Z79.84 LONG TERM (CURRENT) USE OF ORAL HYPOGLYCEMIC DRUGS: ICD-10-CM

## 2024-09-22 DIAGNOSIS — I48.0 PAROXYSMAL ATRIAL FIBRILLATION (H): ICD-10-CM

## 2024-09-22 DIAGNOSIS — A41.9 SEVERE SEPSIS (H): ICD-10-CM

## 2024-09-22 DIAGNOSIS — R65.20 SEVERE SEPSIS (H): ICD-10-CM

## 2024-09-22 DIAGNOSIS — Z79.01 LONG TERM (CURRENT) USE OF ANTICOAGULANTS: ICD-10-CM

## 2024-09-22 LAB
ALBUMIN SERPL BCG-MCNC: 3.8 G/DL (ref 3.5–5.2)
ALBUMIN UR-MCNC: NEGATIVE MG/DL
ALP SERPL-CCNC: 97 U/L (ref 40–150)
ALT SERPL W P-5'-P-CCNC: 15 U/L (ref 0–70)
AMPHETAMINES UR QL SCN: NORMAL
ANION GAP SERPL CALCULATED.3IONS-SCNC: 19 MMOL/L (ref 7–15)
APAP SERPL-MCNC: <5 UG/ML (ref 10–30)
APPEARANCE UR: CLEAR
AST SERPL W P-5'-P-CCNC: 35 U/L (ref 0–45)
BARBITURATES UR QL SCN: NORMAL
BASOPHILS # BLD AUTO: 0 10E3/UL (ref 0–0.2)
BASOPHILS NFR BLD AUTO: 0 %
BENZODIAZ UR QL SCN: NORMAL
BILIRUB DIRECT SERPL-MCNC: <0.2 MG/DL (ref 0–0.3)
BILIRUB SERPL-MCNC: 0.4 MG/DL
BILIRUB UR QL STRIP: NEGATIVE
BUN SERPL-MCNC: 28.9 MG/DL (ref 8–23)
BZE UR QL SCN: NORMAL
CALCIUM SERPL-MCNC: 9.1 MG/DL (ref 8.8–10.4)
CANNABINOIDS UR QL SCN: NORMAL
CHLORIDE SERPL-SCNC: 94 MMOL/L (ref 98–107)
CK SERPL-CCNC: 254 U/L (ref 39–308)
COLOR UR AUTO: NORMAL
CREAT SERPL-MCNC: 1.51 MG/DL (ref 0.67–1.17)
CRP SERPL-MCNC: <3 MG/L
EGFRCR SERPLBLD CKD-EPI 2021: 48 ML/MIN/1.73M2
EOSINOPHIL # BLD AUTO: 0 10E3/UL (ref 0–0.7)
EOSINOPHIL NFR BLD AUTO: 0 %
ERYTHROCYTE [DISTWIDTH] IN BLOOD BY AUTOMATED COUNT: 14.7 % (ref 10–15)
FENTANYL UR QL: NORMAL
GLUCOSE SERPL-MCNC: 178 MG/DL (ref 70–99)
GLUCOSE UR STRIP-MCNC: NEGATIVE MG/DL
HCO3 SERPL-SCNC: 21 MMOL/L (ref 22–29)
HCT VFR BLD AUTO: 36.9 % (ref 40–53)
HGB BLD-MCNC: 11.6 G/DL (ref 13.3–17.7)
HGB UR QL STRIP: NEGATIVE
HYALINE CASTS: 1 /LPF
IMM GRANULOCYTES # BLD: 0.1 10E3/UL
IMM GRANULOCYTES NFR BLD: 1 %
INR PPP: 3.12 (ref 0.85–1.15)
KETONES UR STRIP-MCNC: NEGATIVE MG/DL
LACTATE SERPL-SCNC: 2.7 MMOL/L (ref 0.7–2)
LACTATE SERPL-SCNC: 3.4 MMOL/L (ref 0.7–2)
LEUKOCYTE ESTERASE UR QL STRIP: NEGATIVE
LYMPHOCYTES # BLD AUTO: 1.2 10E3/UL (ref 0.8–5.3)
LYMPHOCYTES NFR BLD AUTO: 7 %
MAGNESIUM SERPL-MCNC: 1.6 MG/DL (ref 1.7–2.3)
MCH RBC QN AUTO: 23.6 PG (ref 26.5–33)
MCHC RBC AUTO-ENTMCNC: 31.4 G/DL (ref 31.5–36.5)
MCV RBC AUTO: 75 FL (ref 78–100)
MONOCYTES # BLD AUTO: 1.4 10E3/UL (ref 0–1.3)
MONOCYTES NFR BLD AUTO: 8 %
NEUTROPHILS # BLD AUTO: 14.7 10E3/UL (ref 1.6–8.3)
NEUTROPHILS NFR BLD AUTO: 85 %
NITRATE UR QL: NEGATIVE
NRBC # BLD AUTO: 0 10E3/UL
NRBC BLD AUTO-RTO: 0 /100
OPIATES UR QL SCN: NORMAL
PCP QUAL URINE (ROCHE): NORMAL
PH UR STRIP: 5 [PH] (ref 5–7)
PHOSPHATE SERPL-MCNC: 2.5 MG/DL (ref 2.5–4.5)
PLATELET # BLD AUTO: 290 10E3/UL (ref 150–450)
POTASSIUM SERPL-SCNC: 3.6 MMOL/L (ref 3.4–5.3)
PROT SERPL-MCNC: 7 G/DL (ref 6.4–8.3)
RBC # BLD AUTO: 4.91 10E6/UL (ref 4.4–5.9)
RBC URINE: <1 /HPF
SALICYLATES SERPL-MCNC: <0.5 MG/DL
SODIUM SERPL-SCNC: 134 MMOL/L (ref 135–145)
SP GR UR STRIP: 1.01 (ref 1–1.03)
TROPONIN T SERPL HS-MCNC: 25 NG/L
TROPONIN T SERPL HS-MCNC: 26 NG/L
TSH SERPL DL<=0.005 MIU/L-ACNC: 0.79 UIU/ML (ref 0.3–4.2)
UROBILINOGEN UR STRIP-MCNC: NORMAL MG/DL
WBC # BLD AUTO: 17.3 10E3/UL (ref 4–11)
WBC URINE: <1 /HPF

## 2024-09-22 PROCEDURE — 93005 ELECTROCARDIOGRAM TRACING: CPT | Performed by: EMERGENCY MEDICINE

## 2024-09-22 PROCEDURE — 250N000011 HC RX IP 250 OP 636: Performed by: EMERGENCY MEDICINE

## 2024-09-22 PROCEDURE — 258N000003 HC RX IP 258 OP 636: Performed by: EMERGENCY MEDICINE

## 2024-09-22 PROCEDURE — 72128 CT CHEST SPINE W/O DYE: CPT

## 2024-09-22 PROCEDURE — 81001 URINALYSIS AUTO W/SCOPE: CPT | Performed by: EMERGENCY MEDICINE

## 2024-09-22 PROCEDURE — 80307 DRUG TEST PRSMV CHEM ANLYZR: CPT | Performed by: EMERGENCY MEDICINE

## 2024-09-22 PROCEDURE — 72125 CT NECK SPINE W/O DYE: CPT

## 2024-09-22 PROCEDURE — 83735 ASSAY OF MAGNESIUM: CPT | Performed by: EMERGENCY MEDICINE

## 2024-09-22 PROCEDURE — 74176 CT ABD & PELVIS W/O CONTRAST: CPT | Mod: 26 | Performed by: RADIOLOGY

## 2024-09-22 PROCEDURE — 72125 CT NECK SPINE W/O DYE: CPT | Mod: 26 | Performed by: RADIOLOGY

## 2024-09-22 PROCEDURE — 73600 X-RAY EXAM OF ANKLE: CPT | Mod: 26 | Performed by: RADIOLOGY

## 2024-09-22 PROCEDURE — 72128 CT CHEST SPINE W/O DYE: CPT | Mod: 26 | Performed by: RADIOLOGY

## 2024-09-22 PROCEDURE — 36415 COLL VENOUS BLD VENIPUNCTURE: CPT | Performed by: EMERGENCY MEDICINE

## 2024-09-22 PROCEDURE — 74176 CT ABD & PELVIS W/O CONTRAST: CPT

## 2024-09-22 PROCEDURE — 87040 BLOOD CULTURE FOR BACTERIA: CPT | Performed by: EMERGENCY MEDICINE

## 2024-09-22 PROCEDURE — 96367 TX/PROPH/DG ADDL SEQ IV INF: CPT | Performed by: EMERGENCY MEDICINE

## 2024-09-22 PROCEDURE — 80143 DRUG ASSAY ACETAMINOPHEN: CPT | Performed by: EMERGENCY MEDICINE

## 2024-09-22 PROCEDURE — 99285 EMERGENCY DEPT VISIT HI MDM: CPT | Performed by: EMERGENCY MEDICINE

## 2024-09-22 PROCEDURE — 70450 CT HEAD/BRAIN W/O DYE: CPT | Mod: 26 | Performed by: RADIOLOGY

## 2024-09-22 PROCEDURE — 85610 PROTHROMBIN TIME: CPT | Performed by: EMERGENCY MEDICINE

## 2024-09-22 PROCEDURE — 93010 ELECTROCARDIOGRAM REPORT: CPT | Performed by: EMERGENCY MEDICINE

## 2024-09-22 PROCEDURE — 80048 BASIC METABOLIC PNL TOTAL CA: CPT | Performed by: EMERGENCY MEDICINE

## 2024-09-22 PROCEDURE — 96366 THER/PROPH/DIAG IV INF ADDON: CPT | Performed by: EMERGENCY MEDICINE

## 2024-09-22 PROCEDURE — 71275 CT ANGIOGRAPHY CHEST: CPT | Mod: 26 | Performed by: RADIOLOGY

## 2024-09-22 PROCEDURE — 84484 ASSAY OF TROPONIN QUANT: CPT | Performed by: EMERGENCY MEDICINE

## 2024-09-22 PROCEDURE — 73600 X-RAY EXAM OF ANKLE: CPT | Mod: 50

## 2024-09-22 PROCEDURE — 86140 C-REACTIVE PROTEIN: CPT | Performed by: EMERGENCY MEDICINE

## 2024-09-22 PROCEDURE — 73560 X-RAY EXAM OF KNEE 1 OR 2: CPT | Mod: 50

## 2024-09-22 PROCEDURE — 85025 COMPLETE CBC W/AUTO DIFF WBC: CPT | Performed by: EMERGENCY MEDICINE

## 2024-09-22 PROCEDURE — 250N000013 HC RX MED GY IP 250 OP 250 PS 637

## 2024-09-22 PROCEDURE — 99285 EMERGENCY DEPT VISIT HI MDM: CPT | Mod: 25 | Performed by: EMERGENCY MEDICINE

## 2024-09-22 PROCEDURE — 82248 BILIRUBIN DIRECT: CPT | Performed by: EMERGENCY MEDICINE

## 2024-09-22 PROCEDURE — 73560 X-RAY EXAM OF KNEE 1 OR 2: CPT | Mod: 26 | Performed by: RADIOLOGY

## 2024-09-22 PROCEDURE — 120N000002 HC R&B MED SURG/OB UMMC

## 2024-09-22 PROCEDURE — 70450 CT HEAD/BRAIN W/O DYE: CPT

## 2024-09-22 PROCEDURE — 82550 ASSAY OF CK (CPK): CPT | Performed by: EMERGENCY MEDICINE

## 2024-09-22 PROCEDURE — 80179 DRUG ASSAY SALICYLATE: CPT | Performed by: EMERGENCY MEDICINE

## 2024-09-22 PROCEDURE — 83550 IRON BINDING TEST: CPT

## 2024-09-22 PROCEDURE — 83605 ASSAY OF LACTIC ACID: CPT | Performed by: EMERGENCY MEDICINE

## 2024-09-22 PROCEDURE — 250N000013 HC RX MED GY IP 250 OP 250 PS 637: Performed by: INTERNAL MEDICINE

## 2024-09-22 PROCEDURE — 84100 ASSAY OF PHOSPHORUS: CPT | Performed by: EMERGENCY MEDICINE

## 2024-09-22 PROCEDURE — 87040 BLOOD CULTURE FOR BACTERIA: CPT

## 2024-09-22 PROCEDURE — 71275 CT ANGIOGRAPHY CHEST: CPT

## 2024-09-22 PROCEDURE — 73620 X-RAY EXAM OF FOOT: CPT | Mod: 26 | Performed by: RADIOLOGY

## 2024-09-22 PROCEDURE — 82728 ASSAY OF FERRITIN: CPT

## 2024-09-22 PROCEDURE — 72131 CT LUMBAR SPINE W/O DYE: CPT

## 2024-09-22 PROCEDURE — 84466 ASSAY OF TRANSFERRIN: CPT

## 2024-09-22 PROCEDURE — 72131 CT LUMBAR SPINE W/O DYE: CPT | Mod: 26 | Performed by: RADIOLOGY

## 2024-09-22 PROCEDURE — 36415 COLL VENOUS BLD VENIPUNCTURE: CPT

## 2024-09-22 PROCEDURE — 73620 X-RAY EXAM OF FOOT: CPT | Mod: 50

## 2024-09-22 PROCEDURE — 84443 ASSAY THYROID STIM HORMONE: CPT | Performed by: EMERGENCY MEDICINE

## 2024-09-22 PROCEDURE — 96365 THER/PROPH/DIAG IV INF INIT: CPT | Mod: 59 | Performed by: EMERGENCY MEDICINE

## 2024-09-22 RX ORDER — LIDOCAINE 40 MG/G
CREAM TOPICAL
Status: DISCONTINUED | OUTPATIENT
Start: 2024-09-22 | End: 2024-10-03 | Stop reason: HOSPADM

## 2024-09-22 RX ORDER — WARFARIN SODIUM 5 MG/1
5 TABLET ORAL
Status: COMPLETED | OUTPATIENT
Start: 2024-09-22 | End: 2024-09-22

## 2024-09-22 RX ORDER — IOPAMIDOL 755 MG/ML
77 INJECTION, SOLUTION INTRAVASCULAR ONCE
Status: DISCONTINUED | OUTPATIENT
Start: 2024-09-22 | End: 2024-09-22

## 2024-09-22 RX ORDER — ACETAMINOPHEN 650 MG/1
650 SUPPOSITORY RECTAL EVERY 4 HOURS PRN
Status: DISCONTINUED | OUTPATIENT
Start: 2024-09-22 | End: 2024-10-03 | Stop reason: HOSPADM

## 2024-09-22 RX ORDER — ONDANSETRON 4 MG/1
4 TABLET, ORALLY DISINTEGRATING ORAL EVERY 6 HOURS PRN
Status: DISCONTINUED | OUTPATIENT
Start: 2024-09-22 | End: 2024-10-03 | Stop reason: HOSPADM

## 2024-09-22 RX ORDER — POTASSIUM CHLORIDE 750 MG/1
40 TABLET, EXTENDED RELEASE ORAL 2 TIMES DAILY
Status: DISCONTINUED | OUTPATIENT
Start: 2024-09-22 | End: 2024-10-03 | Stop reason: HOSPADM

## 2024-09-22 RX ORDER — PIPERACILLIN SODIUM, TAZOBACTAM SODIUM 4; .5 G/20ML; G/20ML
4.5 INJECTION, POWDER, LYOPHILIZED, FOR SOLUTION INTRAVENOUS ONCE
Status: COMPLETED | OUTPATIENT
Start: 2024-09-22 | End: 2024-09-22

## 2024-09-22 RX ORDER — AMOXICILLIN 250 MG
2 CAPSULE ORAL 2 TIMES DAILY PRN
Status: DISCONTINUED | OUTPATIENT
Start: 2024-09-22 | End: 2024-10-03 | Stop reason: HOSPADM

## 2024-09-22 RX ORDER — ONDANSETRON 2 MG/ML
4 INJECTION INTRAMUSCULAR; INTRAVENOUS EVERY 6 HOURS PRN
Status: DISCONTINUED | OUTPATIENT
Start: 2024-09-22 | End: 2024-10-03 | Stop reason: HOSPADM

## 2024-09-22 RX ORDER — SPIRONOLACTONE 50 MG/1
50 TABLET, FILM COATED ORAL DAILY
Status: DISCONTINUED | OUTPATIENT
Start: 2024-09-23 | End: 2024-10-03 | Stop reason: HOSPADM

## 2024-09-22 RX ORDER — AMOXICILLIN 250 MG
2 CAPSULE ORAL 2 TIMES DAILY
Status: DISCONTINUED | OUTPATIENT
Start: 2024-09-22 | End: 2024-10-03 | Stop reason: HOSPADM

## 2024-09-22 RX ORDER — MAGNESIUM SULFATE 1 G/100ML
1 INJECTION INTRAVENOUS ONCE
Status: COMPLETED | OUTPATIENT
Start: 2024-09-22 | End: 2024-09-23

## 2024-09-22 RX ORDER — TAMSULOSIN HYDROCHLORIDE 0.4 MG/1
0.4 CAPSULE ORAL DAILY
Status: DISCONTINUED | OUTPATIENT
Start: 2024-09-23 | End: 2024-10-03 | Stop reason: HOSPADM

## 2024-09-22 RX ORDER — AMOXICILLIN 250 MG
1 CAPSULE ORAL 2 TIMES DAILY PRN
Status: DISCONTINUED | OUTPATIENT
Start: 2024-09-22 | End: 2024-10-03 | Stop reason: HOSPADM

## 2024-09-22 RX ORDER — ACETAMINOPHEN 325 MG/1
650 TABLET ORAL EVERY 4 HOURS PRN
Status: DISCONTINUED | OUTPATIENT
Start: 2024-09-22 | End: 2024-10-03 | Stop reason: HOSPADM

## 2024-09-22 RX ORDER — NYSTATIN 100000 U/G
CREAM TOPICAL 2 TIMES DAILY PRN
Status: DISCONTINUED | OUTPATIENT
Start: 2024-09-22 | End: 2024-10-03 | Stop reason: HOSPADM

## 2024-09-22 RX ORDER — ATORVASTATIN CALCIUM 40 MG/1
40 TABLET, FILM COATED ORAL DAILY
Status: DISCONTINUED | OUTPATIENT
Start: 2024-09-23 | End: 2024-10-03 | Stop reason: HOSPADM

## 2024-09-22 RX ORDER — ASCORBIC ACID 500 MG
1000 TABLET ORAL DAILY
Status: DISCONTINUED | OUTPATIENT
Start: 2024-09-23 | End: 2024-10-03 | Stop reason: HOSPADM

## 2024-09-22 RX ORDER — CALCIUM CARBONATE 500 MG/1
1000 TABLET, CHEWABLE ORAL 4 TIMES DAILY PRN
Status: DISCONTINUED | OUTPATIENT
Start: 2024-09-22 | End: 2024-10-03 | Stop reason: HOSPADM

## 2024-09-22 RX ORDER — METOPROLOL SUCCINATE 50 MG/1
100 TABLET, EXTENDED RELEASE ORAL DAILY
Status: DISCONTINUED | OUTPATIENT
Start: 2024-09-23 | End: 2024-10-01

## 2024-09-22 RX ORDER — VANCOMYCIN HYDROCHLORIDE
1250 EVERY 24 HOURS
Status: DISCONTINUED | OUTPATIENT
Start: 2024-09-23 | End: 2024-09-24

## 2024-09-22 RX ORDER — AMOXICILLIN 250 MG
1 CAPSULE ORAL 2 TIMES DAILY
Status: DISCONTINUED | OUTPATIENT
Start: 2024-09-22 | End: 2024-10-03 | Stop reason: HOSPADM

## 2024-09-22 RX ORDER — TORSEMIDE 100 MG/1
100 TABLET ORAL DAILY
Status: DISCONTINUED | OUTPATIENT
Start: 2024-09-23 | End: 2024-09-25

## 2024-09-22 RX ADMIN — VANCOMYCIN HYDROCHLORIDE 2000 MG: 1 INJECTION, POWDER, LYOPHILIZED, FOR SOLUTION INTRAVENOUS at 22:06

## 2024-09-22 RX ADMIN — PIPERACILLIN AND TAZOBACTAM 4.5 G: 4; .5 INJECTION, POWDER, LYOPHILIZED, FOR SOLUTION INTRAVENOUS at 20:26

## 2024-09-22 RX ADMIN — POTASSIUM CHLORIDE 40 MEQ: 750 TABLET, EXTENDED RELEASE ORAL at 23:38

## 2024-09-22 RX ADMIN — IOPAMIDOL 77 ML: 755 INJECTION, SOLUTION INTRAVENOUS at 20:03

## 2024-09-22 RX ADMIN — WARFARIN SODIUM 5 MG: 5 TABLET ORAL at 23:39

## 2024-09-22 RX ADMIN — SODIUM CHLORIDE, POTASSIUM CHLORIDE, SODIUM LACTATE AND CALCIUM CHLORIDE 1000 ML: 600; 310; 30; 20 INJECTION, SOLUTION INTRAVENOUS at 22:07

## 2024-09-22 RX ADMIN — SODIUM CHLORIDE, POTASSIUM CHLORIDE, SODIUM LACTATE AND CALCIUM CHLORIDE 1000 ML: 600; 310; 30; 20 INJECTION, SOLUTION INTRAVENOUS at 20:26

## 2024-09-22 ASSESSMENT — ACTIVITIES OF DAILY LIVING (ADL)
ADLS_ACUITY_SCORE: 40

## 2024-09-22 NOTE — ED PROVIDER NOTES
History     Chief Complaint   Patient presents with    Fall     HPI  Clarke Moreira is a 74 year old male with a PMH notable for A-fib w/ chronic anticoagulation (Coumadin), DM2, CHF (EF 50-55%), lymphedema, CKD, prior hypotension, Hx prior sepsis, who presents to the ED for evaluation of 1-week weakness of progressive generalized weakness, fatigue, poor heat tolerance in warm apartment, and now recurrent falls/near syncope (incidentally found to have SI w/ plan on screening questioning)    CURRENT PRESENTATION:  Patient reports worsening generalized weakness, fatigue and malaise over the last week, culminating in multiple falls including episode of LOC/syncope w/ such, and weakness to the point of difficulty getting up.     Has had generalized symptoms that are worsening over the last week.  The symptoms as feeling reminiscent of when he had sepsis years ago.  He notes that he [place] on an upper apartment [place], [place] facing w/ large windows, lots of sun and poor air conditioning. Says it has been hot all week, which hasn't been helping generalized weakness sensation.    As his symptoms progressed, did have LOC w/ one fall, and noticed he passed urine with this incident. No known seizure movements. No tongue biting or injuries. Was not witnessed. No hx of seizures. No poor sleep, no withdrawal from anything, no taking or use of stimulants or sympathomimetics. No HAs. No vision changes.  Did have neck discomfort w/ fall. With falls, did sustain pain to both lower extremities, particularly the knees, falling on such. Did scratch toes on both feet with his attempts to get up. Did not have/report initialy, but over ED course did develop some low back discomfort as well. No new numbness, tingling or focal weakness.  No loss of bladder control with the LOC episode, he is otherwise not had any loss of bowel or bladder control.  No reports of saddle anesthesia.  Prior to the fall, was not having any extremity pain.   Pain only located in the areas of his lower extremities that hit in the fall as well as his neck and then subsequently the back.  Otherwise no head pain.  He chipped a tooth with the fall today but otherwise no acute dental issues or symptoms. Has chronic dental issues, and says that when he was septic previously, and kind of felt like this, he that was shortly after having some dental procedures, though has not had recent dental procedures.  Did have mild abrasions the toes that he sustained while trying to get up, but otherwise no rashes or skin changes.  Did note in triage safety screening some chronic SI, plans like jumping out of a building, taking medications, but reportedly feels safe here.  No HI.      No other new symptoms or complaints at this time. Full ROS completed w/o additional findings.         Past Medical History  Past Medical History:   Diagnosis Date    Atrial fibrillation (H)     Basal cell carcinoma     Chronic anticoagulation     Diastolic heart failure (H)     Dyslipidemia     Essential hypertension     Morbid obesity (H)     Sleep-disordered breathing     Type 2 diabetes mellitus (H)      Past Surgical History:   Procedure Laterality Date    BIOPSY OF SKIN LESION      MOHS MICROGRAPHIC PROCEDURE      PICC INSERTION Right 09/24/2017    5fr TL Bard PICC, 51cm (1cm external) in the R medial brachial vein w/ tip in the SVC.     ammonium lactate (LAC-HYDRIN) 12 % external cream  Ascorbic Acid (VITAMIN C) POWD  aspirin (ASA) 81 MG tablet  atorvastatin (LIPITOR) 40 MG tablet  CERTAVITE/ANTIOXIDANTS tablet  levothyroxine (SYNTHROID/LEVOTHROID) 175 MCG tablet  metFORMIN (GLUCOPHAGE) 500 MG tablet  metoprolol succinate ER (TOPROL XL) 100 MG 24 hr tablet  nystatin (MYCOSTATIN) 184386 UNIT/GM external cream  Omega-3 Fatty Acids (EQL FISH OIL) 1000 MG CAPS  order for DME  order for DME  order for DME  order for DME  potassium chloride chen ER (KLOR-CON M20) 20 MEQ CR tablet  senna-docusate (SENEXON-S)  8.6-50 MG tablet  spironolactone (ALDACTONE) 25 MG tablet  tamsulosin (FLOMAX) 0.4 MG capsule  torsemide (DEMADEX) 100 MG tablet  vitamin B complex with vitamin C (VITAMIN  B COMPLEX) tablet  vitamin C (ASCORBIC ACID) 1000 MG TABS  vitamin E (VITAMIN E COMPLEX) 1000 units (450 mg) capsule  warfarin ANTICOAGULANT (COUMADIN) 5 MG tablet      Allergies   Allergen Reactions    Seasonal Allergies      Family History  Family History   Problem Relation Age of Onset    Depression Mother     Glaucoma Maternal Grandfather     Cancer No family hx of         No family history of skin cancer    Macular Degeneration No family hx of      Social History   Social History     Tobacco Use    Smoking status: Never    Smokeless tobacco: Never   Substance Use Topics    Alcohol use: No     Comment: Former alcohol abuse. Quit 70s then quit later in 80s-present    Drug use: No     Comment: history of LSD use              Review of Systems  A complete review of systems was performed with pertinent positives and negatives noted in the HPI, and all other systems negative.    Physical Exam          Physical Exam  CONSTITUTIONAL: Awake and alert. Non-toxic appearance. No acute distress.   HENT:   - Head: Normocephalic and atraumatic.   - Ears: External ear grossly normal.   - Nose: Nose normal. No rhinorrhea. No epistaxis.   - Mouth/Throat: MMM. Chipped tooth (#8), not loose, no pain. No bleeding. No gum or other intraoral findings. No malocclusion. No trismus. Normal voice. Tolerating secretions.   EYES: Conjunctivae and lids are normal. No scleral icterus.   NECK: Normal range of motion and phonation normal. Neck supple.  No tracheal deviation, no stridor. No edema or erythema noted.  CARDIOVASCULAR: Normal rate, regular rhythm and no appreciable abnormal heart sounds.  PULMONARY/CHEST: Normal work of breathing. No accessory muscle usage or stridor. No respiratory distress.  No appreciable abnormal breath sounds.  ABDOMEN: Soft, non-distended.  No tenderness. No peritoneal findings, no rigidity, rebound or guarding.  No palpable masses or abnormal pulsatility appreciated.  MUSCULOSKELETAL: Extremities warm and seemingly well perfused.   NEUROLOGIC: Awake, alert. Not disoriented. No seizure activity. GCS 15  SKIN: Skin is warm and dry. No diaphoresis. No pallor. No rash/acute appearing skin changes noted.  PSYCHIATRIC: Normal mood and affect. Speech and behavior normal. Thought processes linear. Cognition and memory are normal. No SI/HI reported.    ED Course     ED Course as of 09/23/24 1743   Sun Sep 22, 2024   2134 Went to re-evaluate the patient.  While he was not reporting such initially, is now reporting some low back discomfort.  Given what he has the concerns for sepsis but without source, we will send him back for CT A/P including CT of the spine to evaluate further for potential injury, as certainly he be at risk of compression fractures, etc. although like I said was not having pain initially.  Will also need his serial head CT in the setting of falls and anticoagulation.  Medicine team down here in the ED and they have been updated on this as well.     Lactic acid is elevated (though not above 4, and no hypotension), presumed sepsis though source TBD.  Would meet criteria for severe sepsis but not septic shock.     ----------------------------------------------------------------------------------------------------------------------  Critical care was not performed.     Medical Decision Making  The patient's presentation was of high complexity (an acute health issue posing potential threat to life or bodily function).    The patient's evaluation involved:  review of 3+ test result(s) ordered prior to this encounter (see separate area of note for details)  ordering and/or review of 3+ test(s) in this encounter (see separate area of note for details)  discussion of management or test interpretation with another health professional (see separate  area of note for details)    The patient's management necessitated moderate risk (prescription drug management including medications given in the ED), high risk (a decision regarding hospitalization), and further care after sign-out to admitting IM team and overnight EM phsycian (see their note for further management).    Assessments & Plan (with Medical Decision Making)     IMPRESSION:   74 year old male w/ PMH notable for A-fib w/ chronic anticoagulation (Coumadin), DM2, CHF (EF 50-55%), lymphedema, CKD, prior hypotension, Hx prior sepsis, who presents to the ED for evaluation of 1-week weakness of progressive generalized weakness, fatigue, poor heat tolerance in warm apartment, and now recurrent falls/near syncope (incidentally found to have SI w/ plan on screening questioning)    Clinically, patient appears toxic, NAD.  With regards to potential traumatic findings, like he chipped his right front tooth (#8, nontender), has some midline neck discomfort to palpation (c-collar placed) though there were no step-offs or deformities and no focal neurodeficits or abnormalities.  Otherwise no obvious head or neck trauma, no findings for basilar skull fracture, etc.  Obvious chest findings.  Also later reported some back pain, but there is no obvious bony abnormalities appreciated.  Did have bruising and some swelling to both anterior knees, some superficial abrasions to top of the toes on both feet.  No other obvious traumatic injuries appreciated.  Seems to have irregularly irregular rhythm, generally rate controlled, without obvious other cardiopulmonary findings.  No obvious fullness, fluctuance or findings for intraoral abscesses though does have some generally poor dentition (chronic appearing) in addition to the small new chipped tooth.  No obvious cardiopulmonary findings on exam to explain syncopal event.  No obvious focal neurodeficits appreciated.  No seizure-like movements.    Ddx includes, but not limited  to, sepsis (which is sounds like might be most likely, though the source of such is not clear), hypotension/dehydration, potential for seizure (patient did have loss of control with 1 syncopal episode), primary syncope from neurologic or cardiac cause, electrolyte/metabolic derangement, amongst others.    PLAN:   - Placed in C-collar, c-spine precautions given fall and pain on exam (though no stepoffs or deformities or focal neuro findings/changes on exam  -Head, C-spine and chest imaging (later added on spine and abdomen pelvis given he was reporting some additional symptoms and no source of sepsis yet identified)  - Risks/benefits of pursuing imaging reviewed and accepted.   -On presentation, for safety reasons alone the patient will need to be admitted for further evaluation and management, with additional interventions, escalation of care, etc. and as needed based on ED course.    RESULTS:  Labs:   - WBC 17.3, lactate 2.7, CRP < 3  - Initial troponin 26  -   - MICHELLE  - AG 19,   - Mg 1.6  - Blood cultures pending  Urine:   - No sample yet available  Imaging: Written preliminary reports reviewed:  - CT CHEST PULMONARY EMBOLISM W contrast 09/22/24 IMPRESSION:  1.  No pulmonary embolism.  2.  Multiple calcified gallstones.  - CT CERVICAL SPINE W/O CONTRAST 09/22/24 IMPRESSION:   1.  No acute cervical spine fracture.  - CT HEAD W/O CONTRAST 09/22/24 IMPRESSION:  1.  No acute findings. No acute intracranial hemorrhage. No acute calvarial fracture.  - XR KNEE BILATERAL 1/2 VIEWS 09/22/24 IMPRESSION:  RIGHT KNEE: Questionable minimally displaced age-indeterminate fracture of the proximal metaphysis and epiphysis of the fibula. Clinical correlation needed with regard to the location of the patient's symptoms. Moderate medial compartment osteoarthrosis.   Small effusion. Minimal patellofemoral osteoarthrosis.  LEFT KNEE: Minimal patellofemoral osteoarthrosis. Otherwise negative.    Results/reports reviewed w/ patient  who expresses understanding of findings and F/U recommendations.    INTERVENTIONS:   - IVF  - IV Abx: Vanc, zosyn  - IV Mg    RE-EVALUATION:  - The patient's lactate actually increased slightly, and so escalating to C level of care  - Pt otherwise continues to do well here in the ED, no acute issues or apparent concerning changes in vitals or clinical appearance.    DISCUSSIONS:  - w/ Ortho: They've requested foot/ankle xrays, which were ordered.  Will have them follow while patient admitted  - w/ IM: Reviewed patient/presentation, current state of workup/any pending studies. They will admit for further evaluation/management, F/U pending studies as needed, coordinate w/ consulting services as needed. No additional requests/recommendations for workup/management for in the ED at this time.   - w/ Patient: I have reviewed the available findings, plan, need for close follow up, strict return/safety instructions with the patient.     DISPOSITION/PLANNING:  IMPRESSION:   - Severe sepsis w/ leukocytosis and elevated lactate (lactic acid elevation, Source TBD)  - MICHELLE  - Generalized weakness, fatigue, falls  - Possible LE fracture (Ortho to evaluate)  - Falls in setting of chronic anticoagulation  - Syncope/LOC episode w/ fall, loss of bladder control w/ this episode/event (no witnessed seizure movements, but can't exclude)  - Neck pain, back pain, LE pain  - Hypomagnesemia  - SI w/ Plan  DIS[place]ITION:  - [place] [facility] [place]   PENDING:   - Cultures  - Repeat /interval head CT  - No complaining of back pain, and w/o source of sepsis yet at thsi time, will have A/p imaged at same time.   OTHER RECOMMENDATIONS:   - Suicide precautions, Psych consult  - Seizure precautions, Consider Neuro consult  - F/U pending imaging  - Ortho consult  - Trauma consult      ______________________________________________________________________          Jesika Dillon MD  9/22/2024   [facility] EMERGENCY  DEPARTMENT       [facility], Jesika Patten MD  09/24/24 0151

## 2024-09-23 ENCOUNTER — APPOINTMENT (OUTPATIENT)
Dept: CT IMAGING | Facility: CLINIC | Age: 74
End: 2024-09-23
Payer: COMMERCIAL

## 2024-09-23 ENCOUNTER — TELEPHONE (OUTPATIENT)
Dept: FAMILY MEDICINE | Facility: CLINIC | Age: 74
End: 2024-09-23

## 2024-09-23 LAB
ALBUMIN SERPL BCG-MCNC: 3.6 G/DL (ref 3.5–5.2)
ALP SERPL-CCNC: 97 U/L (ref 40–150)
ALT SERPL W P-5'-P-CCNC: 17 U/L (ref 0–70)
ANION GAP SERPL CALCULATED.3IONS-SCNC: 16 MMOL/L (ref 7–15)
AST SERPL W P-5'-P-CCNC: 45 U/L (ref 0–45)
ATRIAL RATE - MUSE: NORMAL BPM
ATRIAL RATE - MUSE: NORMAL BPM
BILIRUB SERPL-MCNC: 0.7 MG/DL
BUN SERPL-MCNC: 22.2 MG/DL (ref 8–23)
CALCIUM SERPL-MCNC: 8.5 MG/DL (ref 8.8–10.4)
CHLORIDE SERPL-SCNC: 99 MMOL/L (ref 98–107)
CREAT SERPL-MCNC: 1.35 MG/DL (ref 0.67–1.17)
CRP SERPL-MCNC: 97.4 MG/L
DIASTOLIC BLOOD PRESSURE - MUSE: NORMAL MMHG
DIASTOLIC BLOOD PRESSURE - MUSE: NORMAL MMHG
EGFRCR SERPLBLD CKD-EPI 2021: 55 ML/MIN/1.73M2
ERYTHROCYTE [DISTWIDTH] IN BLOOD BY AUTOMATED COUNT: 14.8 % (ref 10–15)
FERRITIN SERPL-MCNC: 28 NG/ML (ref 31–409)
GLUCOSE BLDC GLUCOMTR-MCNC: 126 MG/DL (ref 70–99)
GLUCOSE BLDC GLUCOMTR-MCNC: 128 MG/DL (ref 70–99)
GLUCOSE SERPL-MCNC: 127 MG/DL (ref 70–99)
HCO3 SERPL-SCNC: 23 MMOL/L (ref 22–29)
HCT VFR BLD AUTO: 34.3 % (ref 40–53)
HGB BLD-MCNC: 10.8 G/DL (ref 13.3–17.7)
INR PPP: 3.43 (ref 0.85–1.15)
INR PPP: 3.46 (ref 0.85–1.15)
INTERPRETATION ECG - MUSE: NORMAL
INTERPRETATION ECG - MUSE: NORMAL
IRON BINDING CAPACITY (ROCHE): 428 UG/DL (ref 240–430)
IRON SATN MFR SERPL: 3 % (ref 15–46)
IRON SERPL-MCNC: 14 UG/DL (ref 61–157)
LACTATE SERPL-SCNC: 1 MMOL/L (ref 0.7–2)
LACTATE SERPL-SCNC: 3.4 MMOL/L (ref 0.7–2)
MCH RBC QN AUTO: 23.6 PG (ref 26.5–33)
MCHC RBC AUTO-ENTMCNC: 31.5 G/DL (ref 31.5–36.5)
MCV RBC AUTO: 75 FL (ref 78–100)
P AXIS - MUSE: NORMAL DEGREES
P AXIS - MUSE: NORMAL DEGREES
PLATELET # BLD AUTO: 350 10E3/UL (ref 150–450)
POTASSIUM SERPL-SCNC: 3.6 MMOL/L (ref 3.4–5.3)
PR INTERVAL - MUSE: NORMAL MS
PR INTERVAL - MUSE: NORMAL MS
PROT SERPL-MCNC: 6.6 G/DL (ref 6.4–8.3)
QRS DURATION - MUSE: 100 MS
QRS DURATION - MUSE: 94 MS
QT - MUSE: 338 MS
QT - MUSE: 358 MS
QTC - MUSE: 470 MS
QTC - MUSE: 477 MS
R AXIS - MUSE: 36 DEGREES
R AXIS - MUSE: 54 DEGREES
RBC # BLD AUTO: 4.57 10E6/UL (ref 4.4–5.9)
SODIUM SERPL-SCNC: 138 MMOL/L (ref 135–145)
SYSTOLIC BLOOD PRESSURE - MUSE: NORMAL MMHG
SYSTOLIC BLOOD PRESSURE - MUSE: NORMAL MMHG
T AXIS - MUSE: 120 DEGREES
T AXIS - MUSE: 246 DEGREES
TRANSFERRIN SERPL-MCNC: 356 MG/DL (ref 200–360)
VENTRICULAR RATE- MUSE: 104 BPM
VENTRICULAR RATE- MUSE: 120 BPM
WBC # BLD AUTO: 11 10E3/UL (ref 4–11)

## 2024-09-23 PROCEDURE — 99221 1ST HOSP IP/OBS SF/LOW 40: CPT | Performed by: ASSISTANT, PODIATRIC

## 2024-09-23 PROCEDURE — 250N000013 HC RX MED GY IP 250 OP 250 PS 637: Performed by: INTERNAL MEDICINE

## 2024-09-23 PROCEDURE — 86140 C-REACTIVE PROTEIN: CPT | Performed by: PHYSICIAN ASSISTANT

## 2024-09-23 PROCEDURE — 82306 VITAMIN D 25 HYDROXY: CPT

## 2024-09-23 PROCEDURE — 99207 PR APP CREDIT; MD BILLING SHARED VISIT: CPT | Performed by: INTERNAL MEDICINE

## 2024-09-23 PROCEDURE — 82962 GLUCOSE BLOOD TEST: CPT

## 2024-09-23 PROCEDURE — 70450 CT HEAD/BRAIN W/O DYE: CPT

## 2024-09-23 PROCEDURE — 96361 HYDRATE IV INFUSION ADD-ON: CPT | Performed by: EMERGENCY MEDICINE

## 2024-09-23 PROCEDURE — 70486 CT MAXILLOFACIAL W/O DYE: CPT | Mod: 26 | Performed by: RADIOLOGY

## 2024-09-23 PROCEDURE — 5A09357 ASSISTANCE WITH RESPIRATORY VENTILATION, LESS THAN 24 CONSECUTIVE HOURS, CONTINUOUS POSITIVE AIRWAY PRESSURE: ICD-10-PCS | Performed by: INTERNAL MEDICINE

## 2024-09-23 PROCEDURE — 85610 PROTHROMBIN TIME: CPT

## 2024-09-23 PROCEDURE — 70450 CT HEAD/BRAIN W/O DYE: CPT | Mod: 26 | Performed by: RADIOLOGY

## 2024-09-23 PROCEDURE — 96366 THER/PROPH/DIAG IV INF ADDON: CPT | Performed by: EMERGENCY MEDICINE

## 2024-09-23 PROCEDURE — 36415 COLL VENOUS BLD VENIPUNCTURE: CPT

## 2024-09-23 PROCEDURE — 96367 TX/PROPH/DG ADDL SEQ IV INF: CPT | Performed by: EMERGENCY MEDICINE

## 2024-09-23 PROCEDURE — 250N000011 HC RX IP 250 OP 636

## 2024-09-23 PROCEDURE — 250N000013 HC RX MED GY IP 250 OP 250 PS 637

## 2024-09-23 PROCEDURE — 70486 CT MAXILLOFACIAL W/O DYE: CPT

## 2024-09-23 PROCEDURE — 93005 ELECTROCARDIOGRAM TRACING: CPT

## 2024-09-23 PROCEDURE — 258N000003 HC RX IP 258 OP 636

## 2024-09-23 PROCEDURE — 99223 1ST HOSP IP/OBS HIGH 75: CPT | Mod: GC | Performed by: INTERNAL MEDICINE

## 2024-09-23 PROCEDURE — 83605 ASSAY OF LACTIC ACID: CPT

## 2024-09-23 PROCEDURE — 120N000002 HC R&B MED SURG/OB UMMC

## 2024-09-23 PROCEDURE — 85014 HEMATOCRIT: CPT

## 2024-09-23 PROCEDURE — 80053 COMPREHEN METABOLIC PANEL: CPT

## 2024-09-23 RX ORDER — HYDROXYZINE HYDROCHLORIDE 50 MG/1
50 TABLET, FILM COATED ORAL EVERY 6 HOURS PRN
Status: DISCONTINUED | OUTPATIENT
Start: 2024-09-23 | End: 2024-10-03 | Stop reason: HOSPADM

## 2024-09-23 RX ORDER — PIPERACILLIN SODIUM, TAZOBACTAM SODIUM 4; .5 G/20ML; G/20ML
4.5 INJECTION, POWDER, LYOPHILIZED, FOR SOLUTION INTRAVENOUS EVERY 6 HOURS
Status: DISCONTINUED | OUTPATIENT
Start: 2024-09-23 | End: 2024-09-24

## 2024-09-23 RX ORDER — WARFARIN SODIUM 1 MG/1
2 TABLET ORAL
Status: COMPLETED | OUTPATIENT
Start: 2024-09-23 | End: 2024-09-23

## 2024-09-23 RX ORDER — HYDROXYZINE HYDROCHLORIDE 25 MG/1
25 TABLET, FILM COATED ORAL EVERY 6 HOURS PRN
Status: DISCONTINUED | OUTPATIENT
Start: 2024-09-23 | End: 2024-10-03 | Stop reason: HOSPADM

## 2024-09-23 RX ADMIN — POTASSIUM CHLORIDE 40 MEQ: 750 TABLET, EXTENDED RELEASE ORAL at 08:09

## 2024-09-23 RX ADMIN — SPIRONOLACTONE 50 MG: 50 TABLET ORAL at 08:10

## 2024-09-23 RX ADMIN — VANCOMYCIN HYDROCHLORIDE 1250 MG: 10 INJECTION, POWDER, LYOPHILIZED, FOR SOLUTION INTRAVENOUS at 19:52

## 2024-09-23 RX ADMIN — TAMSULOSIN HYDROCHLORIDE 0.4 MG: 0.4 CAPSULE ORAL at 08:09

## 2024-09-23 RX ADMIN — PIPERACILLIN AND TAZOBACTAM 4.5 G: 4; .5 INJECTION, POWDER, LYOPHILIZED, FOR SOLUTION INTRAVENOUS at 17:01

## 2024-09-23 RX ADMIN — B-COMPLEX W/ C & FOLIC ACID TAB 1 TABLET: TAB at 08:10

## 2024-09-23 RX ADMIN — MAGNESIUM SULFATE HEPTAHYDRATE 1 G: 1 INJECTION, SOLUTION INTRAVENOUS at 01:01

## 2024-09-23 RX ADMIN — ATORVASTATIN CALCIUM 40 MG: 40 TABLET, FILM COATED ORAL at 08:10

## 2024-09-23 RX ADMIN — WARFARIN SODIUM 2 MG: 1 TABLET ORAL at 17:09

## 2024-09-23 RX ADMIN — PIPERACILLIN AND TAZOBACTAM 4.5 G: 4; .5 INJECTION, POWDER, LYOPHILIZED, FOR SOLUTION INTRAVENOUS at 11:08

## 2024-09-23 RX ADMIN — METOPROLOL SUCCINATE 100 MG: 50 TABLET, EXTENDED RELEASE ORAL at 08:10

## 2024-09-23 RX ADMIN — PIPERACILLIN AND TAZOBACTAM 4.5 G: 4; .5 INJECTION, POWDER, LYOPHILIZED, FOR SOLUTION INTRAVENOUS at 22:58

## 2024-09-23 RX ADMIN — SODIUM CHLORIDE, POTASSIUM CHLORIDE, SODIUM LACTATE AND CALCIUM CHLORIDE 1000 ML: 600; 310; 30; 20 INJECTION, SOLUTION INTRAVENOUS at 12:43

## 2024-09-23 RX ADMIN — LEVOTHYROXINE SODIUM 175 MCG: 0.05 TABLET ORAL at 07:02

## 2024-09-23 RX ADMIN — OXYCODONE HYDROCHLORIDE AND ACETAMINOPHEN 1000 MG: 500 TABLET ORAL at 08:10

## 2024-09-23 RX ADMIN — SODIUM CHLORIDE, POTASSIUM CHLORIDE, SODIUM LACTATE AND CALCIUM CHLORIDE 1000 ML: 600; 310; 30; 20 INJECTION, SOLUTION INTRAVENOUS at 04:09

## 2024-09-23 ASSESSMENT — ACTIVITIES OF DAILY LIVING (ADL)
ADLS_ACUITY_SCORE: 40
ADLS_ACUITY_SCORE: 40
ADLS_ACUITY_SCORE: 47
ADLS_ACUITY_SCORE: 47
ADLS_ACUITY_SCORE: 40
ADLS_ACUITY_SCORE: 40
ADLS_ACUITY_SCORE: 42
ADLS_ACUITY_SCORE: 40
ADLS_ACUITY_SCORE: 40
ADLS_ACUITY_SCORE: 47
ADLS_ACUITY_SCORE: 47
ADLS_ACUITY_SCORE: 42
ADLS_ACUITY_SCORE: 40
ADLS_ACUITY_SCORE: 47
ADLS_ACUITY_SCORE: 40
ADLS_ACUITY_SCORE: 40

## 2024-09-23 NOTE — PROGRESS NOTES
{Smartlist below should be selected and completed by the supervising physician (resident, fellow or attending) present with the patient; ***}  {Resident, fellow or attending ATTESTATION:115808}    [facility]    Progress Note - Medicine Service, ANGEL LUIS TEAM 3       Date of Admission:  9/22/2024    Assessment & Plan   Clarke Moreira is a 74 year old male admitted on 9/22/2024. He has a history of *** and is admitted for ***    ***         Diet: Regular Diet Adult    DVT Prophylaxis: {DVT PROPHYLAXIS:083178}  Bazzi Catheter: Not present  Fluids: ***  Lines: None     Cardiac Monitoring: ACTIVE order. Indication: syncope  Code Status: Full Code      Clinically Significant Risk Factors Present on Admission   { TIP  This section helps capture the illness of the patient on admission.     - Review diagnoses highlighted in blue; right click, edit & delete if not appropriate   - If blank, no additional diagnoses identified   :88621}      # Hyponatremia: Lowest Na = 134 mmol/L in last 2 days, will monitor as appropriate    # Hypomagnesemia: Lowest Mg = 1.6 mg/dL in last 2 days, will replace as needed  # Anion Gap Metabolic Acidosis: Highest Anion Gap = 19 mmol/L in last 2 days, will monitor and treat as appropriate   # Drug Induced Coagulation Defect: home medication list includes an anticoagulant medication  # Drug Induced Platelet Defect: home medication list includes an antiplatelet medication   # Hypertension: Noted on problem list        # DMII: A1C = 6.6 % (Ref range: 0.0 - 5.6 %) within past 6 months               Disposition Plan      Expected Discharge Date: 09/24/2024                The patient's care was discussed with the { :532550}.    Teresa Morales  Medical Student  Medicine Service, MARSUSY TEAM 3  [facility]  Securely message with Purkinje (more info)  Text page via Jaunt Paging/Directory   See signed in provider for up  to date coverage information  ______________________________________________________________________    Interval History   {Pertinent overnight events :***}    Physical Exam   Vital Signs: Temp: 98.3  F (36.8  C) Temp src: Oral BP: 114/61 Pulse: 88   Resp: 16 SpO2: 99 % O2 Device: None (Room air)    Weight: 0 lbs 0 oz    {Recommend personal SmartPhrase or Notewriter for exam (OPTIONAL)   :818225}    Medical Decision Making   { TIP   MDM Calculator    MDM grid (w/ times)    Coding Support Chat  Billing is now based on time OR medical decision making complexity. Medical decision making included in the A&P does NOT need to be re-documented. Documented time is for the billing provider only (not including resident/fellow time).    :75786}    {Medical Decision Making (OPTIONAL)   :383346}      Data   {INSERT LABS/IMAGING (OPTIONAL)   :441335}

## 2024-09-23 NOTE — CODE/RAPID RESPONSE
Rapid Response Team Note    Assessment   A rapid response was called on Clarke Moreira due to lactic acidosis and tremors . This presentation is likely due to sepsis from suspected Citrobacter bacteremia.    Plan   -  Agree with primary team: IV fluid bolus, hydroxyzine, ID consult  -EKG with Afib with RVR, heart rate improved with deep breathing techniques, question how much anxiety is playing a role   -  The Internal Medicine primary team was at bedside  -  Disposition: The patient will remain on the current unit. We will continue to monitor this patient closely.  -  Reassessment and plan follow-up will be performed by the primary team    Katie Huff PA-C  Rapid Response Team [facility]ANELLE  Securely message with Artoo     Medical Decision Making               Hospital Course   Brief Summary of events leading to rapid response:   Rapid response called for lactic acidosis and tremors.  Patient currently admitted for syncope, rigors, and leukocytosis.  Blood cultures positive for citrobacter, however cannot find these positive results    Physical Exam   Vital Signs: Temp: 98.1  F (36.7  C) Temp src: Oral BP: 108/56 Pulse: 88   Resp: 16 SpO2: 98 % O2 Device: None (Room air)    Weight: 0 lbs 0 oz      Exam:   GENERAL: Alert and oriented x 3. Anxious Cooperative.   HEENT: Anicteric sclera. Mucous membranes moist. NC. AT.   CV: RRR. S1, S2. No murmurs appreciated.   RESPIRATORY: Effort normal on RA Lungs CTAB with no wheezing, rales, rhonchi.   GI: Abdomen soft and distended with normoactive bowel sounds present in all quadrants. No tenderness   NEUROLOGICAL: No focal deficits. Moves all extremities.  Resting tremor, improves with deep breathing and relaxation techniques   EXTREMITIES: No peripheral edema.   SKIN: No jaundice. No rashes.            Significant Results and Procedures   Lactic acid 3.4, EKG with afib with RVR    Sepsis Evaluation   The patient is known to have an infection.    Vital signs, lab and physical  "exam findings constitute a diagnosis of SEVERE SEPSIS, based on:SIRS criteria met and Lactic acid resulted with a level > 2.0          Anti-infectives (From now, onward)      Start     Dose/Rate Route Frequency Ordered Stop    09/23/24 2030  vancomycin (VANCOCIN) 1,250 mg in 0.9% NaCl 250 mL intermittent infusion        Placed in \"Followed by\" Linked Group    1,250 mg  166.7 mL/hr over 90 Minutes Intravenous EVERY 24 HOURS 09/22/24 1939      09/23/24 0935  piperacillin-tazobactam (ZOSYN) 4.5 g vial to attach to  mL bag        Note to Pharmacy: For [facility], [facility] and [facility]: For Zosyn-naive patients, use the \"Zosyn initial dose + extended infusion\" order panel.    4.5 g  over 30 Minutes Intravenous EVERY 6 HOURS 09/23/24 0930            Current antibiotic coverage is appropriate for source of infection.    3 Hour Severe Sepsis Bundle Completion:  1. Initial Lactic Acid result shown above. Repeat lactic acid ordered by reflex for 2 hours from initial collection.  2. Blood Cultures: Ordered, to be drawn prior to antibiotics.  3. Broad Spectrum Antibiotics Administered: yes  4. Is hypotension present? No (IV fluid bolus NOT required). IV Fluid volume administered: 1L    "

## 2024-09-23 NOTE — PHARMACY-VANCOMYCIN DOSING SERVICE
"Pharmacy Vancomycin Initial Note  Date of Service 2024  Patient's  1950  74 year old, male    Indication: Sepsis    Current estimated CrCl = Estimated Creatinine Clearance: 71.4 mL/min (A) (based on SCr of 1.51 mg/dL (H)).    Creatinine for last 3 days  2024:  5:03 PM Creatinine 1.51 mg/dL    Recent Vancomycin Level(s) for last 3 days  No results found for requested labs within last 3 days.      Vancomycin IV Administrations (past 72 hours)        No vancomycin orders with administrations in past 72 hours.                    Nephrotoxins and other renal medications (From now, onward)      Start     Dose/Rate Route Frequency Ordered Stop    24  vancomycin (VANCOCIN) 1,250 mg in 0.9% NaCl 250 mL intermittent infusion        Placed in \"Followed by\" Linked Group    1,250 mg  166.7 mL/hr over 90 Minutes Intravenous EVERY 24 HOURS 24  vancomycin (VANCOCIN) 2,000 mg in sodium chloride 0.9 % 500 mL intermittent infusion        Placed in \"Followed by\" Linked Group    2,000 mg  over 120 Minutes Intravenous ONCE 24  piperacillin-tazobactam (ZOSYN) 4.5 g vial to attach to  mL bag        Note to Pharmacy: For [facility], [facility] and [facility]: For Zosyn-naive patients, use the \"Zosyn initial dose + extended infusion\" order panel.    4.5 g  over 30 Minutes Intravenous ONCE 24              Contrast Orders - past 72 hours (72h ago, onward)      Start     Dose/Rate Route Frequency Stop    24  iopamidol (ISOVUE-370) solution 77 mL         77 mL Intravenous ONCE              InsightRX Prediction of Planned Initial Vancomycin Regimen  Loading dose: 2000 mg at 00:00 2024.  Regimen: 1250 mg IV every 24 hours.  Start time: 00:00 on 2024  Exposure target: AUC24 (range)400-600 mg/L.hr   AUC24,ss: 457 mg/L.hr  Probability of AUC24 > 400: 60 %  Ctrough,ss: 11.4 mg/L  Probability of Ctrough,ss > 20: 25 %  Probability of " nephrotoxicity (Lodise ADI 2009): 7 %          Plan:  Start vancomycin  2000 mg IV once followed by 1250 mg IV every 24h.   Vancomycin monitoring method: AUC  Vancomycin therapeutic monitoring goal: 400-600 mg*h/L  Pharmacy will check vancomycin levels as appropriate in 1-3 Days.    Serum creatinine levels will be ordered daily for the first week of therapy and at least twice weekly for subsequent weeks.      MAR WYNNE RPH

## 2024-09-23 NOTE — PLAN OF CARE
Neuro: A&Ox4.   Cardiac: Afib. VSS.   Respiratory: Sating above 92% on RA.  GI/: Adequate urine output. No BM today  Diet/appetite: Tolerating regular diet. Poor appetite, did not eat anything today  Activity:  Assist of 2-3  Pain: At acceptable level on current regimen.   Skin: No new deficits noted.  LDA's: PIV, saline locked  Plan: Continue with POC. Notify primary team with changes.  Goal Outcome Evaluation:      Plan of Care Reviewed With: patient

## 2024-09-23 NOTE — PHARMACY-ANTICOAGULATION SERVICE
Clinical Pharmacy - Warfarin Dosing Consult     Pharmacy has been consulted to manage this patient s warfarin therapy.  Indication: Atrial Fibrillation  Therapy Goal: INR 2-3  OP Anticoag Clinic: [facility]  Warfarin Prior to Admission: Yes  Warfarin PTA Regimen: 10 mg daily    INR   Date Value Ref Range Status   09/22/2024 3.12 (H) 0.85 - 1.15 Final   07/31/2024 2.6 (H) 0.9 - 1.1 Final       Confirmed with patient last dose was 9/21 pm. Recommend warfarin 5 mg today.  Pharmacy will monitor Clarke Moreira daily and order warfarin doses to achieve specified goal.      Please contact pharmacy as soon as possible if the warfarin needs to be held for a procedure or if the warfarin goals change.

## 2024-09-23 NOTE — CONSULTS
Dental Hygiene Consult Service        Clarke Moreira MRN# [id number]   YOB: 1950 Age: 74 year old     Date of Admission: 9/22/2024  PCP is Henrry Shepard  Date of Service: 9/23/2024           Reason for Consult:   Referring MD & Reason for Visit: I was asked by Nikhil Barahona MD, to see Clarke Moreira for a COMPREHENSIVE oral assessment regarding sepsis unclear source, history of dental caries being prior source of sepsis.     *Please note: dental hygiene services, including oral assessment, are not a replacement for examination by a licensed dentist. The patient has been informed of the oral assessment procedures and has provided verbal consent.       History of Present Illness:   This patient is a 74 year old male with a history of T2DM w/ neuropathy and chronic ulcers, pAF, HFpEF, HLD, MDD, on CPAP and uses walker at home presents after 2 syncopal episodes and admitted for sepsis 2/2 GNR (Citrobacter) bacteremia, now on IV antibiotics .  Dental and oropharyngeal history is pertinent for prior hx of sepsis w/ dental caries source.  The patient reports he fell and chipped a front tooth when he fainted. Has not had a regular dentist in several years.          OHIP-5 Questionnaire    In the past SEVEN days:   Have you had difficulty chewing any foods because of problems with your teeth, mouth, dentures or jaw? 2 - Sometimes   Have you had painful aching in your mouth? 0 - Never   Have you felt uncomfortable about the appearance of your teeth, mouth dentures or jaws? 4 - Everyday/almost everyday   Have you felt that there has been less flavor in your food because of problems with your teeth, mouth, dentures or jaws? 0 - Never   Have you had difficulty doing your usual jobs because of problems with your teeth, mouth, dentures or jaws? 0 - Never   Total OHIP Score 6   General Observations   Level of support Patient requires some assistance:  oral care products at bedside and/or reminders.     Patient currently in pain No   Patient wears dentures No   Current oral care routine - States difficult to keep up with oral care during depressive bouts. Tries to twice a day.    Patient has oral care products Patient does not have oral care products   Extraoral assessment   General appearance Symmetrical and Normal TMJ function, bruise on bottom of chin due to falling yesterday.    Lymph nodes Symmetrical, no abnormalities, non-tender   Oral Health Assessment Tool (OHAT)   Lips 0=Healthy: smooth, pink, moist   Tongue 1=Changes: (ie. patchy, fissured, red, coated ) - dry and fissured.    Gums and Tissues 1=Changes: (ie. dry, shiny, rough, red, swollen, one ulcer/sore spot (under dentures)) - inflamed and erythematous gingiva, around gingival margin. Dry mucosal tissues.    Saliva 1=Changes: (ie. dry, sticky tissues, little saliva present, resident thinks they have dry mouth )- slightly dry and sticky.    Natural Teeth Yes/No 2=Unhealthy: (ie. 4+ decayed or broken teeth/roots, or very worn down teeth, or less than 4 teeth) - suspicious for caries and early caries, especially along gumline. No broken or fractured teeth noted on assessment today. No evidence of fistula nor mobility. No tenderness on palpation or percussion of #8     Dentures Yes/No Patient does not wear dentures   Oral Cleanliness 2=Unhealthy: ( ie. food particles/tartar/plaque in most areas of the mouth or on most of dentures or severe halitosis (bad breath)) - mild to moderate plaque accumulation in the mouth along gumline and tongue.    Dental Pain 0=Healthy: no behavioural, verbal, or physical signs of dental pain, though he did state the chipped tooth is sharp.    OHAT Total Score (0-16) 7   Other Dental Concerns Patient does not have dental home, Infrequent professional dental care, and suspicious for caries    Care provided during assessment Oral Assessment and Patient education   Follow up DH assessments required? No   Connect with [facility] or  [facility] care? Yes: to evaluate assessment, images and determine dx and treatment plan if indicated.   For Dental Team Service Requesting Consult:  internal medicine  Clearance/Reason:  non clearance, sepsis unclear source w/ dental caries history  Dental CT status:  ordered.  Upcoming Sx date(s), if known:  not known.  Expected discharge date, if known:  not known  Ability to transfer to [facility]:  not known, pt mentioned he's ran into weight limitations in prior offices.  Pain reported, by pt:  none  Swelling present:  none  Current dental Rx regimen:  none known   Oral Care Plan - Discussed importance of routine oral care, especially during hospitalization. If pt can only manage 1x/day, prioritize night time.   - Please order biotene mouth rinse for xerostomia. Use 2x/day and PRN. Before bed.   - Encouraged pt to seek out-patient comprehensive dental care to establish dental home. Gave information for [facility] dental clinics.             Assessment and Plan:   Assessment:  This patient is a 74 year old male with a history of T2DM w/ neuropathy and chronic ulcers, pAF, HFpEF, HLD, MDD, on CPAP and uses walker at home presents after 2 syncopal episodes and admitted for sepsis 2/2 GNR (Citrobacter) bacteremia, now on IV antibiotics , oral exam concerning for sepsis unclear source w/ history of sepsis w/ dental caries as source. Suspicious for areas of caries t/o dentition, no dental home.      Plan:   - Med team to order dental CT  [facility] will review DH note and dental CT results to provide further recommendations for patient's dental needs.   Implement oral care plan as described above to reduce susceptibility to HAIs and optimize oral health.  Pt to continue comprehensive dental care at dental clinic of choice. Information below for the [facility] if pt would like to use as a resource:  Angel[facility] Pugh - [address] [place] [address], [phone]  [phone]    CELI Abraham  Message on ChatLingual  Pager: [phone]      Past Medical History   I have reviewed this patient's medical history and updated it with pertinent information if needed.  Past Medical History:   Diagnosis Date    Atrial fibrillation (H)     Basal cell carcinoma     Chronic anticoagulation     Diastolic heart failure (H)     Dyslipidemia     Essential hypertension     Morbid obesity (H)     Sleep-disordered breathing     Type 2 diabetes mellitus (H)        Past Surgical History   I have reviewed this patient's surgical history and updated it with pertinent information if needed.  Past Surgical History:   Procedure Laterality Date    BIOPSY OF SKIN LESION      MOHS MICROGRAPHIC PROCEDURE      PICC INSERTION Right 09/24/2017    5fr TL Bard PICC, 51cm (1cm external) in the R medial brachial vein w/ tip in the SVC.       Social History   I have reviewed this patient's social history and updated it with pertinent information if needed.  Social History     Tobacco Use    Smoking status: Never    Smokeless tobacco: Never   Substance Use Topics    Alcohol use: No     Comment: Former alcohol abuse. Quit 70s then quit later in 80s-present    Drug use: No     Comment: history of LSD use     Prior to Admission Medications   Prior to Admission Medications   Prescriptions Last Dose Informant Patient Reported? Taking?   Ascorbic Acid (VITAMIN C) POWD   Yes No   Sig: Take 500 mcg by mouth daily   CERTAVITE/ANTIOXIDANTS tablet   No No   Sig: TAKE ONE TABLET BY MOUTH ONE TIME DAILY   Omega-3 Fatty Acids (EQL FISH OIL) 1000 MG CAPS   No No   Sig: Take 1 capsule by mouth daily   ammonium lactate (LAC-HYDRIN) 12 % external cream   No No   Sig: Apply topically 2 times daily as needed for dry skin   aspirin (ASA) 81 MG tablet   No No   Sig: Take 1 tablet (81 mg) by mouth daily   atorvastatin (LIPITOR) 40 MG tablet   No No   Sig: Take 1 tablet (40 mg) by mouth daily   levothyroxine (SYNTHROID/LEVOTHROID) 175 MCG  tablet   No No   Sig: Take 1 tablet (175 mcg) by mouth every morning (before breakfast)   metFORMIN (GLUCOPHAGE) 500 MG tablet   No No   Sig: Take 1 tablet (500 mg) by mouth 2 times daily (with meals)   metoprolol succinate ER (TOPROL XL) 100 MG 24 hr tablet   No No   Sig: Take 1 tablet (100 mg) by mouth daily   nystatin (MYCOSTATIN) 830184 UNIT/GM external cream   No No   Sig: Apply topically 2 times daily as needed for dry skin   order for DME   No No   Sig: Equipment being ordered: Other: Velcro Compression stockings.   Treatment Diagnosis: bilateral lymphedema.   order for DME   No No   Sig: Equipment being ordered: Bariatric Lift chair  Diagnosis - morbid obesity, CHF, Lymphedema    Fax to Ne from [employer] at [phone].   order for DME   No No   Sig: Equipment being ordered: Custom diabetic shoes   order for DME   No No   Sig: Equipment being ordered: Diabetes Shoes   potassium chloride chen ER (KLOR-CON M20) 20 MEQ CR tablet   No No   Sig: Take 2 tablets (40 mEq) by mouth 2 times daily   senna-docusate (SENEXON-S) 8.6-50 MG tablet   No No   Sig: Take 1 to 2 tablets by mouth 2 times daily as needed for constipation   spironolactone (ALDACTONE) 25 MG tablet   No No   Sig: Take 2 tablets (50 mg) by mouth daily   tamsulosin (FLOMAX) 0.4 MG capsule   No No   Sig: Take 1 capsule (0.4 mg) by mouth daily   torsemide (DEMADEX) 100 MG tablet   No No   Sig: Take 1 tablet (100 mg) by mouth daily   vitamin B complex with vitamin C (VITAMIN  B COMPLEX) tablet   No No   Sig: Take 1 tablet by mouth daily   vitamin C (ASCORBIC ACID) 1000 MG TABS   Yes No   Sig: Take 1,000 mg by mouth daily   vitamin E (VITAMIN E COMPLEX) 1000 units (450 mg) capsule   No No   Sig: Take 1 capsule (1,000 Units) by mouth daily   warfarin ANTICOAGULANT (COUMADIN) 5 MG tablet   No No   Sig: Take 5 mg (5 mg x 1) every Thu; 10 mg (5 mg x 2) all other days or as directed by the INR clinic.      Facility-Administered Medications: None      Allergies   Allergies   Allergen Reactions    Seasonal Allergies      Physical Exam

## 2024-09-23 NOTE — CONSULTS
General Infectious Disease Service Consultation - [facility] Team  Patient:  Clarke Moreira  Date of birth 1950  Medical record number [id number]  Date of Admission: 9/22/2024  Date of Visit:  9/23/2024  Requesting Provider: Nikhil Barahona         Assessment and Recommendations:     Problem List:    Rigors & lactic acidosis   Hypotension - likely due to dehydration  New microcytic anemia    Discussion:    Based on the patient s presentation and current clinical course, there are no convincing signs of infection at this time. He has remained afebrile throughout his admission, and his initial leukocytosis has resolved, with a normal CRP. While there was an initial report of blood cultures growing Citrobacter, this now appears to have been a misunderstanding, as the cultures have shown no growth. There are no localizing symptoms of infection, and podiatry s evaluation of his chronic diabetic ulcers revealed only superficial wounds with no evidence of infection.    Clinically, the patient appears dehydrated, which may be contributing to his syncopal episodes and tenuous blood pressure. Given this, would consider re-evaluating his current diuretic regimen, as volume depletion could be playing a role. His mild lactic acidosis is more likely attributable to metformin use rather than infection. He also has new microcytic anemia - unknown why.    If the blood cultures continue to remain negative, we recommend discontinuing antibiotics tomorrow, as there is no clear indication for their continuation in the absence of an identifiable infectious source.    Plan:    - obtain MRSA screen  - recommend following blood cultures, and discontinuing antibiotics if cultures show no growth by tomorrow     Recommendations discussed with supervising attending physician, Dr. Fagan.     Thank you for this consult. The General ID team will continue to follow this patient. Please feel free to call with any questions.      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 The patient reports that these events occurred while reaching for an item and later the same evening, without any preceding auras or post-ictal confusion. He recalls the events fully and notes a similar syncopal episode several years ago, during which he had sepsis secondary to dental caries.    He describes feeling generally  off  and fatigued since Wednesday. His symptoms include cough, night sweats (waking soaked in sweat), and decreased urinary frequency. He denies fever, chest pain, dyspnea, abdominal pain, dysuria, or changes in bowel movements. He acknowledges poor dentition. Additionally, he has a history of osteomyelitis related to diabetic foot ulcers.    Upon admission, he exhibited leukocytosis (WBC 17.3 with left shift) but has remained afebrile throughout. His initial evaluation revealed a bland urinalysis and unremarkable head imaging. Orthopedic scans showed no acute findings.    The patient was empirically started on vancomycin and piperacillin-tazobactam prior to the availability of blood culture data, based on concern for sepsis with an unclear source. Overnight, there was a reported note of blood cultures growing Gram-negative rods (Citrobacter), but this has since been questioned, and subsequent cultures have shown no growth. It remains unclear if the cultures ever grew bacteria or if there was a miscommunication.    The patient s WBC count has since normalized, his CRP is <3, and he remains afebrile with mild lactic acidosis (potentially due to metformin). His blood pressure has been somewhat tenuous but is improving with fluid  resuscitation. The source of possible infection remains unclear, though both dental and diabetic foot ulcer sources are being evaluated. Podiatry evaluated his foot wounds, noting superficial ulcers without signs of infection, and managed them conservatively. A dental hygiene consultation has been initiated due to his poor dentition and history of sepsis from dental caries.    Cultures:     9/22 blood cultures - negative to date     Antibiotics:     Van + zosyn 9/22 - present          Review of Systems:     CONSTITUTIONAL:  No fevers, chills+  INTEGUMENTARY/SKIN: NEGATIVE for worrisome rashes, moles or lesions  EYES: Negative for icterus, vision changes or irritation  ENT/MOUTH:  Negative for oral lesions and sore throat  RESPIRATORY:  Negative for cough and dyspnea  CARDIOVASCULAR:  Negative for chest pain, palpitations and  shortness of breath  GASTROINTESTINAL:  Negative for abdominal pain, nausea, vomiting, diarrhea and constipation  GENITOURINARY:  Negative for dysuria, hematuria, frequency and urgency  MUSCULOSKELETAL: Bilateral knee joint pain, swelling, motion restriction, negative for musculoskeletal pain  NEURO:  Negative for headache, altered mental status, numbness or weakness  PSYCHIATRIC: Negative for changes in mood or affect  HEMATOLOGIC/LYMPHATIC: negative for lymphadenopathy or bleeding  ALLERGIC/IMMUNOLOGIC: Negative for allergic reaction   ENDOCRINE: Negative for temperature intolerance, skin/hair changes       Past Medical History:     Past Medical History:   Diagnosis Date    Atrial fibrillation (H)     Basal cell carcinoma     Chronic anticoagulation     Diastolic heart failure (H)     Dyslipidemia     Essential hypertension     Morbid obesity (H)     Sleep-disordered breathing     Type 2 diabetes mellitus (H)          Allergies:      Allergies   Allergen Reactions    Seasonal Allergies         Family History:     Reviewed and noncontributory.   Family History   Problem Relation Age of Onset     Depression Mother     Glaucoma Maternal Grandfather     Cancer No family hx of         No family history of skin cancer    Macular Degeneration No family hx of             Social History:     Social History     Socioeconomic History    Marital status: Single     Spouse name: Not on file    Number of children: Not on file    Years of education: Not on file    Highest education level: Not on file   Occupational History    Not on file   Tobacco Use    Smoking status: Never    Smokeless tobacco: Never   Substance and Sexual Activity    Alcohol use: No     Comment: Former alcohol abuse. Quit 70s then quit later in 80s-present    Drug use: No     Comment: history of LSD use    Sexual activity: Not on file   Other Topics Concern    Parent/sibling w/ CABG, MI or angioplasty before 65F 55M? Not Asked   Social History Narrative    Lives in an apartment in 16th floor near hospital.  Has elevators, unable to use stairs. Not able to shop; has things delivered to him including food. Difficulty completing cleaning. Goes to PCP once yearly for annual check up and medication review.     Social Determinants of Health     Financial Resource Strain: Not on file   Food Insecurity: Not on file   Transportation Needs: Not on file   Physical Activity: Not on file   Stress: Not on file   Social Connections: Not on file   Interpersonal Safety: Low Risk  (3/15/2024)    Interpersonal Safety     Do you feel physically and emotionally safe where you currently live?: Yes     Within the past 12 months, have you been hit, slapped, kicked or otherwise physically hurt by someone?: No     Within the past 12 months, have you been humiliated or emotionally abused in other ways by your partner or ex-partner?: No   Housing Stability: Low Risk  (9/25/2023)    Housing Stability     Do you have housing? : Yes     Are you worried about losing your housing?: Not on file            Physical Exam:     /56   Pulse 88   Temp 98.1  F (36.7  C)   Resp 16    SpO2 98%      Exam:  General: obese,no acute distress  Eyes: no icterus, no conjunctival injection, no papilledema  ENT: no erythema, no tongue biting, poor dentition  Cardiac: regular rate/rhythm, no murmurs/rubs/gallops. 1+ pitting edema of bilateral lower legs and feet.   Pulmonary: wheezes/rales/rhonchi of right middle and lower lobes; otherwise clear to auscultation   Skin: dry scaly skin over legs bilaterally and ecchymoses over knee. Open small abrasion wounds over toes bilaterally.  Mental Status: awake and alert         Laboratory Data:     Creatinine   Date Value Ref Range Status   09/23/2024 1.35 (H) 0.67 - 1.17 mg/dL Final   09/22/2024 1.51 (H) 0.67 - 1.17 mg/dL Final   07/11/2024 1.31 (H) 0.67 - 1.17 mg/dL Final   01/17/2024 1.08 0.67 - 1.17 mg/dL Final   09/13/2023 1.25 (H) 0.67 - 1.17 mg/dL Final   05/06/2021 1.35 (H) 0.66 - 1.25 mg/dL Final   05/06/2021 1.20 0.66 - 1.25 mg/dL Final   11/09/2020 1.1 0.7 - 1.3 mg/dL Final   11/05/2019 1.21 0.66 - 1.25 mg/dL Final   10/28/2019 1.36 (H) 0.66 - 1.25 mg/dL Final     WBC   Date Value Ref Range Status   05/06/2021 11.1 (H) 4.0 - 11.0 10e9/L Final   11/05/2019 11.4 (H) 4.0 - 11.0 10e9/L Final   03/16/2019 7.7 4.0 - 11.0 10e9/L Final   03/15/2019 9.1 4.0 - 11.0 10e9/L Final   03/14/2019 7.6 4.0 - 11.0 10e9/L Final     WBC Count   Date Value Ref Range Status   09/23/2024 11.0 4.0 - 11.0 10e3/uL Final   09/22/2024 17.3 (H) 4.0 - 11.0 10e3/uL Final   01/17/2024 9.5 4.0 - 11.0 10e3/uL Final   07/03/2023 10.0 4.0 - 11.0 10e3/uL Final   12/13/2022 8.4 4.0 - 11.0 10e3/uL Final     Hemoglobin   Date Value Ref Range Status   09/23/2024 10.8 (L) 13.3 - 17.7 g/dL Final   05/06/2021 16.3 13.3 - 17.7 g/dL Final     Platelet Count   Date Value Ref Range Status   09/23/2024 350 150 - 450 10e3/uL Final   05/06/2021 247 150 - 450 10e9/L Final     Lab Results   Component Value Date     09/23/2024    BUN 22.2 09/23/2024    CO2 23 09/23/2024     CRP Inflammation   Date  "Value Ref Range Status   05/06/2021 7.4 0.0 - 8.0 mg/L Final   11/05/2019 <2.9 0.0 - 8.0 mg/L Final   03/06/2019 5.9 0.0 - 8.0 mg/L Final   01/06/2018 8.9 (H) 0.0 - 8.0 mg/L Final   01/05/2018 6.2 0.0 - 8.0 mg/L Final           Pertinent Recent Microbiology Data:     No results for input(s): \"CULT\", \"SDES\" in the last 168 hours.         Imaging:     Recent Results (from the past 48 hour(s))   XR Knee Bilateral 1/2 Views    Narrative    EXAM: XR KNEE BILATERAL 1/2 VIEWS  LOCATION: [facility]  DATE: 9/22/2024    INDICATION: Fall; pain, bruising and swelling.  COMPARISON: None.      Impression    IMPRESSION:   RIGHT KNEE: Questionable minimally displaced age-indeterminate fracture of the proximal metaphysis and epiphysis of the fibula. Clinical correlation needed with regard to the location of the patient's symptoms. Moderate medial compartment osteoarthrosis.   Small effusion. Minimal patellofemoral osteoarthrosis.    LEFT KNEE: Minimal patellofemoral osteoarthrosis. Otherwise negative.   Head CT w/o contrast    Narrative    EXAM: CT HEAD W/O CONTRAST  LOCATION: [facility]  DATE: 9/22/2024    INDICATION: Falls, weakness, anticoagulated, ? seizure  COMPARISON: 3/6/2019  TECHNIQUE: Routine CT Head without IV contrast. Multiplanar reformats. Dose reduction techniques were used.    FINDINGS:  INTRACRANIAL CONTENTS: No intracranial hemorrhage, extraaxial collection, or mass effect.  No CT evidence of acute infarct. Mild presumed chronic small vessel ischemic changes. Mild generalized volume loss. No hydrocephalus. Encephalomalacia in the left   parieto-occipital distribution is stable from 2019.     VISUALIZED ORBITS/SINUSES/MASTOIDS: No intraorbital abnormality. No paranasal sinus mucosal disease. No middle ear or mastoid effusion.    BONES/SOFT TISSUES: No acute abnormality.      Impression    IMPRESSION:  1.  No acute findings. " No acute intracranial hemorrhage. No acute calvarial fracture.   CT Cervical Spine w/o Contrast    Narrative    EXAM: CT CERVICAL SPINE W/O CONTRAST  LOCATION: [facility]  DATE: 9/22/2024    INDICATION: fall, neck pain  COMPARISON: None.  TECHNIQUE: Routine CT Cervical Spine without IV contrast. Multiplanar reformats. Dose reduction techniques were used.    FINDINGS:  Vertebral body heights are maintained. Multilevel mild loss of disc height. No anterolisthesis or retrolisthesis. No jumped or perched facets. Spinous processes are intact.    No acute cervical spine fracture.    Soft tissues of the neck are unremarkable. Lung apices are unremarkable.      Impression    IMPRESSION:  1.  No acute cervical spine fracture.   CT Chest Pulmonary Embolism w Contrast    Narrative    EXAM: CT CHEST PULMONARY EMBOLISM W CONTRAST  LOCATION: [facility]  DATE: 9/22/2024    INDICATION: ? pe   possible LOC, tachycardia, falls, ? PE, infection et al  COMPARISON: None.  TECHNIQUE: CT chest pulmonary angiogram during arterial phase injection of IV contrast. Multiplanar reformats and MIP reconstructions were performed. Dose reduction techniques were used.   CONTRAST: 77ml Isovue 370    FINDINGS:  ANGIOGRAM CHEST: Pulmonary arteries are normal caliber and negative for pulmonary emboli. Thoracic aorta is negative for dissection. No CT evidence of right heart strain.    LUNGS AND PLEURA: Normal.    MEDIASTINUM/AXILLAE: Normal.    CORONARY ARTERY CALCIFICATION: None.    UPPER ABDOMEN: Multiple calcified gallstones.    MUSCULOSKELETAL: Normal.      Impression    IMPRESSION:  1.  No pulmonary embolism.    2.  Multiple calcified gallstones.   XR Ankle Bilateral 2 Views    Narrative    EXAM: XR ANKLE BILATERAL 2 VIEWS, XR FOOT BILATERAL 2 VIEWS  LOCATION: [facility]  DATE: 9/22/2024    INDICATION: pain,  falls  COMPARISON: None.      Impression    IMPRESSION:     Diffuse osteopenia.    Acute or subacute intra-articular fracture of the medial base of the first proximal phalanx.    Chronic appearing deformity of the left proximal phalanx.    Intramedullary nail in the right fibula, incompletely imaged.    Small plantar calcaneal enthesophytes bilaterally.    No dislocation.    Vascular calcifications.   XR Foot Bilateral 2 Views    Narrative    EXAM: XR ANKLE BILATERAL 2 VIEWS, XR FOOT BILATERAL 2 VIEWS  LOCATION: [facility]  DATE: 9/22/2024    INDICATION: pain, falls  COMPARISON: None.      Impression    IMPRESSION:     Diffuse osteopenia.    Acute or subacute intra-articular fracture of the medial base of the first proximal phalanx.    Chronic appearing deformity of the left proximal phalanx.    Intramedullary nail in the right fibula, incompletely imaged.    Small plantar calcaneal enthesophytes bilaterally.    No dislocation.    Vascular calcifications.   CT Abdomen Pelvis w/o Contrast    Narrative    EXAM: CT ABDOMEN PELVIS W/O CONTRAST  LOCATION: [facility]  DATE: 9/22/2024    INDICATION: ? source for sepsis, falls  COMPARISON: 1/16/2007  TECHNIQUE: CT scan of the abdomen and pelvis was performed without IV contrast. Multiplanar reformats were obtained. Dose reduction techniques were used.  CONTRAST: None.    FINDINGS:   LOWER CHEST: Coronary calcification. Lung bases are clear.    HEPATOBILIARY: Normal contour with no significant mass. No bile duct dilatation. Calcified gallstones.    PANCREAS: Normal.    SPLEEN: Normal.    ADRENAL GLANDS: Normal.    KIDNEYS/BLADDER: Contrast in the collecting systems from recent IV contrast administration. This slightly limits assessment for calculi; no calculi identified. No hydronephrosis.     BOWEL: No obstruction or inflammatory change. No free air, free fluid, fluid  collection.    LYMPH NODES: Normal.    VASCULATURE: No abdominal aortic aneurysm. Mild left chronic calcification.    PELVIC ORGANS: Normal.    MUSCULOSKELETAL: Degenerative changes of the spine and hips.      Impression    IMPRESSION:   1.  No acute process in the abdomen or pelvis. No source of sepsis identified.  2.  Cholelithiasis.     CT Lumbar Spine w/o Contrast    Narrative    EXAM: CT LUMBAR SPINE W/O CONTRAST, CT THORACIC SPINE W/O CONTRAST  LOCATION: [facility]  DATE: 9/22/2024    INDICATION: fall, pain, ? injury  COMPARISON: None.  TECHNIQUE:  1) Routine CT Thoracic Spine without IV contrast. Multiplanar reformats. Dose reduction techniques were used.   2) Routine CT Lumbar Spine without IV contrast. Multiplanar reformats. Dose reduction techniques were used.     FINDINGS:    THORACIC SPINE CT:  VERTEBRA: Mild dextroscoliosis. Otherwise vertebral alignment anatomic. Normal vertebral body heights. No fracture or posttraumatic subluxation. Diffuse idiopathic skeletal hyperostosis. Osteopenia.    CANAL/FORAMINA: Mild multilevel degenerative disc disease. Small partially calcified right subarticular disc protrusion at T5-T6 indents the ventral thecal sac. No high-grade central canal stenosis. Moderate left T8-T9, moderate bilateral T9-T10, and   marked left and moderate right T10-T11 foraminal narrowing due to facet hypertrophic changes. There is less pronounced mild foraminal narrowing at a few additional levels.    PARASPINAL: Mild atherosclerosis thoracic aorta. Coronary artery calcifications. Cholelithiasis.    LUMBAR SPINE CT:  VERTEBRA: Mild levoscoliosis. Otherwise vertebral alignment anatomic. Normal vertebral body heights. No fracture or posttraumatic subluxation. Osteopenia.    CANAL/FORAMINA: Marked degenerative disc disease L4-L5. No high-grade central canal stenosis. Mild left L2-L3, mild to moderate bilateral L3-L4, and marked left and moderate right  L4-L5 foraminal narrowing due to facet hypertrophic changes.    PARASPINAL: No extraspinal abnormality.      Impression    IMPRESSION:  THORACIC SPINE CT:  1.  No fracture or posttraumatic subluxation.  2.  Degenerative changes with moderate to marked foraminal narrowing from T8 to T11 as described above.    LUMBAR SPINE CT:  1.  No fracture or posttraumatic subluxation.  2.  Degenerative changes with marked left L4-L5 foraminal narrowing.     CT Thoracic Spine w/o Contrast    Narrative    EXAM: CT LUMBAR SPINE W/O CONTRAST, CT THORACIC SPINE W/O CONTRAST  LOCATION: [facility]  DATE: 9/22/2024    INDICATION: fall, pain, ? injury  COMPARISON: None.  TECHNIQUE:  1) Routine CT Thoracic Spine without IV contrast. Multiplanar reformats. Dose reduction techniques were used.   2) Routine CT Lumbar Spine without IV contrast. Multiplanar reformats. Dose reduction techniques were used.     FINDINGS:    THORACIC SPINE CT:  VERTEBRA: Mild dextroscoliosis. Otherwise vertebral alignment anatomic. Normal vertebral body heights. No fracture or posttraumatic subluxation. Diffuse idiopathic skeletal hyperostosis. Osteopenia.    CANAL/FORAMINA: Mild multilevel degenerative disc disease. Small partially calcified right subarticular disc protrusion at T5-T6 indents the ventral thecal sac. No high-grade central canal stenosis. Moderate left T8-T9, moderate bilateral T9-T10, and   marked left and moderate right T10-T11 foraminal narrowing due to facet hypertrophic changes. There is less pronounced mild foraminal narrowing at a few additional levels.    PARASPINAL: Mild atherosclerosis thoracic aorta. Coronary artery calcifications. Cholelithiasis.    LUMBAR SPINE CT:  VERTEBRA: Mild levoscoliosis. Otherwise vertebral alignment anatomic. Normal vertebral body heights. No fracture or posttraumatic subluxation. Osteopenia.    CANAL/FORAMINA: Marked degenerative disc disease L4-L5. No high-grade  central canal stenosis. Mild left L2-L3, mild to moderate bilateral L3-L4, and marked left and moderate right L4-L5 foraminal narrowing due to facet hypertrophic changes.    PARASPINAL: No extraspinal abnormality.      Impression    IMPRESSION:  THORACIC SPINE CT:  1.  No fracture or posttraumatic subluxation.  2.  Degenerative changes with moderate to marked foraminal narrowing from T8 to T11 as described above.    LUMBAR SPINE CT:  1.  No fracture or posttraumatic subluxation.  2.  Degenerative changes with marked left L4-L5 foraminal narrowing.     Head CT w/o contrast    Narrative    EXAM: CT HEAD W/O CONTRAST  LOCATION: [facility]  DATE: 9/23/2024    INDICATION: Already received contrast today, protocol appropriately for such; fall, anticoagulated, repeat head CT.  COMPARISON: 09/22/2024.  TECHNIQUE: Routine CT Head without IV contrast. Multiplanar reformats. Dose reduction techniques were used.    FINDINGS:  INTRACRANIAL CONTENTS: No intracranial hemorrhage, extraaxial collection, or mass effect. Chronic infarctions within the occipital lobes bilaterally. Mild presumed chronic small vessel ischemic changes. Mild generalized volume loss. No hydrocephalus.     VISUALIZED ORBITS/SINUSES/MASTOIDS: No intraorbital abnormality. No paranasal sinus mucosal disease. No middle ear or mastoid effusion.    BONES/SOFT TISSUES: No acute abnormality.      Impression    IMPRESSION:  1.  No acute intracranial process.

## 2024-09-23 NOTE — TELEPHONE ENCOUNTER
Patient is currently in the hospital and had to cancel the visit for 09/24 as he suspects he will be there for a while. He states that he will call you directly when he is ready to reschedule.

## 2024-09-23 NOTE — H&P
[facility]    History and Physical - Medicine Service, MAROON TEAM        Date of Admission:  9/22/2024    Assessment & Plan      Clarke Moreira is a 74 year old male with T2DM w/ neuropathy and chronic ulcers, pAF, HFpEF, HLD, MDD, on CPAP and uses walker at home presents after 2 syncopal episodes and admitted for sepsis 2/2 GNR (Citrobacter) bacteremia, now on IV antibiotics    Sepsis 2/2 Citrobacter bacteremia  Syncopal episodes x2 9/22  Patient presented after syncopal episode with rigors and leukocytosis of 17.3 with leftward ANC shift. UA bland without dysuria. Neuro causes of syncope less likely given negative head imaging though seizures cannot be completely ruled out. Cardiac causes of syncope less likely given unremarkable EKG findings and patient with recent cardiac workup for HFpEF and CTPE negative. Pt pan-scanned orthopaedically to rule out injuries 2/2 syncope without acute findings. Blood cultures positive for GNR - Citrobacter, speciation pending.  - Vanc/zosyn started empirically, consulted ID regarding starting cefepime in light of citrobacter, please re-page in AM  - Fluid resuscitation, received 1L in ED, ordered second 1L LR bolus. Given patient's body habitus, consider giving more fluids as not fully fluid resuscitated  - Dental hygiene consulted given sepsis of unclear source with dental caries being prior source of sepsis  - Podiatry consult given open wounds and sepsis of unclear source in patient with chronic diabetic ulcers and history of great toe osteomyelitis    MICHELLE on CKD 3a  Admit Cr. 1.51, baseline Cr 1. Likely in setting of sepsis and hypovolemia  - S/p 2L LR boluses  - Ordered bladder scan  - Avoid NSAIDs  - CTM, BMP    Anion gap metabolic acidosis  Lactic acidosis likely 2/2 sepsis  Likely elevated in setting of rising lactate 2.7->3.4  - CTM, repeat lactate in AM    Anemia, microcytic  Admit Hgb 11.6.   - CTM, CBC  - Iron  studies ordered    Type 2 diabetes w neuropathy  Chronic ulcerations  Onychomycosis  Patient with open erosions/ulcerations on dorsum of feet, unclear if there prior to fall, possible source of bacteremia  - HOLD metformin 500 mg daily    Paroxysmal atrial fibrillation  Elevated INR  Admit INR 3.12  - PTA metoprolol succinate 100 mg daily  - warfarin, pharmacy to dose    HFpEF  - PTA Kcl 20 meq daily  - PTA torsemide 100 mg daily  - PTA spironolactone 25 mg daily    Hypothyroidism  - PTA levothyroxine 175 mcg daily    HLD  - atorvastatin 40 mg    BPH  - PTA tamsulosin 0.4 mg daily    Chronic hypercapnic respiratory failure  - CPAP at night    MDD  CHRIS  Hx active suicidal ideation  Frequent psychology visits. On risk assessment today, patient denies suicidal intent or plan. Patient says that as he ages, his desire for self-harm decreases as his end would be a natural process due to old age.             Diet: Regular Diet Adult  DVT Prophylaxis: Warfarin  Bazzi Catheter: Not present  Fluids: None  Lines: None     Cardiac Monitoring: ACTIVE order. Indication: syncope  Code Status: Full Code    Clinically Significant Risk Factors Present on Admission         # Hyponatremia: Lowest Na = 134 mmol/L in last 2 days, will monitor as appropriate    # Hypomagnesemia: Lowest Mg = 1.6 mg/dL in last 2 days, will replace as needed  # Anion Gap Metabolic Acidosis: Highest Anion Gap = 19 mmol/L in last 2 days, will monitor and treat as appropriate     # Drug Induced Coagulation Defect: home medication list includes an anticoagulant medication  # Drug Induced Platelet Defect: home medication list includes an antiplatelet medication   # Hypertension: Noted on problem list        # DMII: A1C = 6.6 % (Ref range: 0.0 - 5.6 %) within past 6 months               Disposition Plan      Expected Discharge Date: 09/24/2024                The patient's care was discussed with the Attending Physician, Dr. Barahona .      Delmi Carter MD  Medicine  Service, ANGEL LUIS MADDEN   [facility]  Securely message with [employer] (more info)  Text page via [employer] Paging/Directory   See signed in provider for up to date coverage information  ______________________________________________________________________    Chief Complaint   syncope    History is obtained from the patient    History of Present Illness   Clarke Moreira is a 74 year old male with T2DM w/ neuropathy and chronic ulcers, pAF, HFpEF, HLD, MDD, on CPAP and uses walker at home presents after 2 syncopal episodes.    Patient states that since Wednesday he has been feeling tired/off. Earlier today, he was reaching for something in his cupboard and next thing he knew, he was on the floor. This was not proceeded by any auras and not follwed by any post-ictal state. He feels that he recalls the entire event. It took him a while to get up given his body habitus and mobility and he states that later that evening, it happened again similarly.    Patient has fainted like this before several years ago and had sepsis then 2/2 to his dental carries. He also has a history of osteomyelitis. He has no history or family history of seizures.    Patient reports cough, chills (waking up at night soaked in sweat just this past week), he reports less urinary frequency than normal. Patient reports dentition not great  Patient denies fever, chest pain, abdominal pain, dyspnea, changes in stooling, dysuria.      Past Medical History    Past Medical History:   Diagnosis Date    Atrial fibrillation (H)     Basal cell carcinoma     Chronic anticoagulation     Diastolic heart failure (H)     Dyslipidemia     Essential hypertension     Morbid obesity (H)     Sleep-disordered breathing     Type 2 diabetes mellitus (H)        Past Surgical History   Past Surgical History:   Procedure Laterality Date    BIOPSY OF SKIN LESION      MOHS MICROGRAPHIC PROCEDURE      PICC INSERTION Right 09/24/2017     5fr TL Bard PICC, 51cm (1cm external) in the R medial brachial vein w/ tip in the SVC.       Prior to Admission Medications   Prior to Admission Medications   Prescriptions Last Dose Informant Patient Reported? Taking?   Ascorbic Acid (VITAMIN C) POWD   Yes No   Sig: Take 500 mcg by mouth daily   CERTAVITE/ANTIOXIDANTS tablet   No No   Sig: TAKE ONE TABLET BY MOUTH ONE TIME DAILY   Omega-3 Fatty Acids (EQL FISH OIL) 1000 MG CAPS   No No   Sig: Take 1 capsule by mouth daily   ammonium lactate (LAC-HYDRIN) 12 % external cream   No No   Sig: Apply topically 2 times daily as needed for dry skin   aspirin (ASA) 81 MG tablet   No No   Sig: Take 1 tablet (81 mg) by mouth daily   atorvastatin (LIPITOR) 40 MG tablet   No No   Sig: Take 1 tablet (40 mg) by mouth daily   levothyroxine (SYNTHROID/LEVOTHROID) 175 MCG tablet   No No   Sig: Take 1 tablet (175 mcg) by mouth every morning (before breakfast)   metFORMIN (GLUCOPHAGE) 500 MG tablet   No No   Sig: Take 1 tablet (500 mg) by mouth 2 times daily (with meals)   metoprolol succinate ER (TOPROL XL) 100 MG 24 hr tablet   No No   Sig: Take 1 tablet (100 mg) by mouth daily   nystatin (MYCOSTATIN) 460374 UNIT/GM external cream   No No   Sig: Apply topically 2 times daily as needed for dry skin   order for DME   No No   Sig: Equipment being ordered: Other: Velcro Compression stockings.   Treatment Diagnosis: bilateral lymphedema.   order for DME   No No   Sig: Equipment being ordered: Bariatric Lift chair  Diagnosis - morbid obesity, CHF, Lymphedema    Fax to Ne from [facility] at [phone].   order for DME   No No   Sig: Equipment being ordered: Custom diabetic shoes   order for DME   No No   Sig: Equipment being ordered: Diabetes Shoes   potassium chloride chen ER (KLOR-CON M20) 20 MEQ CR tablet   No No   Sig: Take 2 tablets (40 mEq) by mouth 2 times daily   senna-docusate (SENEXON-S) 8.6-50 MG tablet   No No   Sig: Take 1 to 2 tablets by mouth 2 times daily as needed for  constipation   spironolactone (ALDACTONE) 25 MG tablet   No No   Sig: Take 2 tablets (50 mg) by mouth daily   tamsulosin (FLOMAX) 0.4 MG capsule   No No   Sig: Take 1 capsule (0.4 mg) by mouth daily   torsemide (DEMADEX) 100 MG tablet   No No   Sig: Take 1 tablet (100 mg) by mouth daily   vitamin B complex with vitamin C (VITAMIN  B COMPLEX) tablet   No No   Sig: Take 1 tablet by mouth daily   vitamin C (ASCORBIC ACID) 1000 MG TABS   Yes No   Sig: Take 1,000 mg by mouth daily   vitamin E (VITAMIN E COMPLEX) 1000 units (450 mg) capsule   No No   Sig: Take 1 capsule (1,000 Units) by mouth daily   warfarin ANTICOAGULANT (COUMADIN) 5 MG tablet   No No   Sig: Take 5 mg (5 mg x 1) every Thu; 10 mg (5 mg x 2) all other days or as directed by the INR clinic.      Facility-Administered Medications: None          Physical Exam   Vital Signs: Temp: 98.3  F (36.8  C) Temp src: Oral BP: 114/61 Pulse: 88   Resp: 16 SpO2: 99 % O2 Device: None (Room air)    Weight: 0 lbs 0 oz    General Appearance: A&O x4, actively rigoring during exam  Respiratory: CTAB, no WOB  Cardiovascular: tachycardic, no murmurs appreciated  GI: soft, distended, non-tender  Skin: warm and dry; ecchymosis on bilateral kneecaps, venous stasis dermatitis on bilateral shins, yellow crusting and dystrophic nails with severe onychomycosis and fresh ulcerations on top dorsum of toes    Medical Decision Making           Data     I have personally reviewed the following data over the past 24 hrs:    17.3 (H)  \   11.6 (L)   / 290     134 (L) 94 (L) 28.9 (H) /  178 (H)   3.6 21 (L) 1.51 (H) \     ALT: 15 AST: 35 AP: 97 TBILI: 0.4   ALB: 3.8 TOT PROTEIN: 7.0 LIPASE: N/A     Trop: 25 (H) BNP: N/A     TSH: 0.79 T4: N/A A1C: N/A     Procal: N/A CRP: <3.00 Lactic Acid: 3.4 (H)       INR:  3.43 (H) PTT:  N/A   D-dimer:  N/A Fibrinogen:  N/A       Imaging results reviewed over the past 24 hrs:   Recent Results (from the past 24 hour(s))   XR Knee Bilateral 1/2 Views     Narrative    EXAM: XR KNEE BILATERAL 1/2 VIEWS  LOCATION: [facility]  DATE: 9/22/2024    INDICATION: Fall; pain, bruising and swelling.  COMPARISON: None.      Impression    IMPRESSION:   RIGHT KNEE: Questionable minimally displaced age-indeterminate fracture of the proximal metaphysis and epiphysis of the fibula. Clinical correlation needed with regard to the location of the patient's symptoms. Moderate medial compartment osteoarthrosis.   Small effusion. Minimal patellofemoral osteoarthrosis.    LEFT KNEE: Minimal patellofemoral osteoarthrosis. Otherwise negative.   Head CT w/o contrast    Narrative    EXAM: CT HEAD W/O CONTRAST  LOCATION: [facility]  DATE: 9/22/2024    INDICATION: Falls, weakness, anticoagulated, ? seizure  COMPARISON: 3/6/2019  TECHNIQUE: Routine CT Head without IV contrast. Multiplanar reformats. Dose reduction techniques were used.    FINDINGS:  INTRACRANIAL CONTENTS: No intracranial hemorrhage, extraaxial collection, or mass effect.  No CT evidence of acute infarct. Mild presumed chronic small vessel ischemic changes. Mild generalized volume loss. No hydrocephalus. Encephalomalacia in the left   parieto-occipital distribution is stable from 2019.     VISUALIZED ORBITS/SINUSES/MASTOIDS: No intraorbital abnormality. No paranasal sinus mucosal disease. No middle ear or mastoid effusion.    BONES/SOFT TISSUES: No acute abnormality.      Impression    IMPRESSION:  1.  No acute findings. No acute intracranial hemorrhage. No acute calvarial fracture.   CT Cervical Spine w/o Contrast    Narrative    EXAM: CT CERVICAL SPINE W/O CONTRAST  LOCATION: [facility]  DATE: 9/22/2024    INDICATION: fall, neck pain  COMPARISON: None.  TECHNIQUE: Routine CT Cervical Spine without IV contrast. Multiplanar reformats. Dose reduction techniques were used.    FINDINGS:  Vertebral  body heights are maintained. Multilevel mild loss of disc height. No anterolisthesis or retrolisthesis. No jumped or perched facets. Spinous processes are intact.    No acute cervical spine fracture.    Soft tissues of the neck are unremarkable. Lung apices are unremarkable.      Impression    IMPRESSION:  1.  No acute cervical spine fracture.   CT Chest Pulmonary Embolism w Contrast    Narrative    EXAM: CT CHEST PULMONARY EMBOLISM W CONTRAST  LOCATION: [facility]  DATE: 9/22/2024    INDICATION: ? pe   possible LOC, tachycardia, falls, ? PE, infection et al  COMPARISON: None.  TECHNIQUE: CT chest pulmonary angiogram during arterial phase injection of IV contrast. Multiplanar reformats and MIP reconstructions were performed. Dose reduction techniques were used.   CONTRAST: 77ml Isovue 370    FINDINGS:  ANGIOGRAM CHEST: Pulmonary arteries are normal caliber and negative for pulmonary emboli. Thoracic aorta is negative for dissection. No CT evidence of right heart strain.    LUNGS AND PLEURA: Normal.    MEDIASTINUM/AXILLAE: Normal.    CORONARY ARTERY CALCIFICATION: None.    UPPER ABDOMEN: Multiple calcified gallstones.    MUSCULOSKELETAL: Normal.      Impression    IMPRESSION:  1.  No pulmonary embolism.    2.  Multiple calcified gallstones.   XR Ankle Bilateral 2 Views    Narrative    EXAM: XR ANKLE BILATERAL 2 VIEWS, XR FOOT BILATERAL 2 VIEWS  LOCATION: [facility]  DATE: 9/22/2024    INDICATION: pain, falls  COMPARISON: None.      Impression    IMPRESSION:     Diffuse osteopenia.    Acute or subacute intra-articular fracture of the medial base of the first proximal phalanx.    Chronic appearing deformity of the left proximal phalanx.    Intramedullary nail in the right fibula, incompletely imaged.    Small plantar calcaneal enthesophytes bilaterally.    No dislocation.    Vascular calcifications.   XR Foot Bilateral 2 Views     Narrative    EXAM: XR ANKLE BILATERAL 2 VIEWS, XR FOOT BILATERAL 2 VIEWS  LOCATION: [facility]  DATE: 9/22/2024    INDICATION: pain, falls  COMPARISON: None.      Impression    IMPRESSION:     Diffuse osteopenia.    Acute or subacute intra-articular fracture of the medial base of the first proximal phalanx.    Chronic appearing deformity of the left proximal phalanx.    Intramedullary nail in the right fibula, incompletely imaged.    Small plantar calcaneal enthesophytes bilaterally.    No dislocation.    Vascular calcifications.   CT Abdomen Pelvis w/o Contrast    Narrative    EXAM: CT ABDOMEN PELVIS W/O CONTRAST  LOCATION: [facility]  DATE: 9/22/2024    INDICATION: ? source for sepsis, falls  COMPARISON: 1/16/2007  TECHNIQUE: CT scan of the abdomen and pelvis was performed without IV contrast. Multiplanar reformats were obtained. Dose reduction techniques were used.  CONTRAST: None.    FINDINGS:   LOWER CHEST: Coronary calcification. Lung bases are clear.    HEPATOBILIARY: Normal contour with no significant mass. No bile duct dilatation. Calcified gallstones.    PANCREAS: Normal.    SPLEEN: Normal.    ADRENAL GLANDS: Normal.    KIDNEYS/BLADDER: Contrast in the collecting systems from recent IV contrast administration. This slightly limits assessment for calculi; no calculi identified. No hydronephrosis.     BOWEL: No obstruction or inflammatory change. No free air, free fluid, fluid collection.    LYMPH NODES: Normal.    VASCULATURE: No abdominal aortic aneurysm. Mild left chronic calcification.    PELVIC ORGANS: Normal.    MUSCULOSKELETAL: Degenerative changes of the spine and hips.      Impression    IMPRESSION:   1.  No acute process in the abdomen or pelvis. No source of sepsis identified.  2.  Cholelithiasis.     CT Lumbar Spine w/o Contrast    Narrative    EXAM: CT LUMBAR SPINE W/O CONTRAST, CT THORACIC SPINE W/O  CONTRAST  LOCATION: [facility]  DATE: 9/22/2024    INDICATION: fall, pain, ? injury  COMPARISON: None.  TECHNIQUE:  1) Routine CT Thoracic Spine without IV contrast. Multiplanar reformats. Dose reduction techniques were used.   2) Routine CT Lumbar Spine without IV contrast. Multiplanar reformats. Dose reduction techniques were used.     FINDINGS:    THORACIC SPINE CT:  VERTEBRA: Mild dextroscoliosis. Otherwise vertebral alignment anatomic. Normal vertebral body heights. No fracture or posttraumatic subluxation. Diffuse idiopathic skeletal hyperostosis. Osteopenia.    CANAL/FORAMINA: Mild multilevel degenerative disc disease. Small partially calcified right subarticular disc protrusion at T5-T6 indents the ventral thecal sac. No high-grade central canal stenosis. Moderate left T8-T9, moderate bilateral T9-T10, and   marked left and moderate right T10-T11 foraminal narrowing due to facet hypertrophic changes. There is less pronounced mild foraminal narrowing at a few additional levels.    PARASPINAL: Mild atherosclerosis thoracic aorta. Coronary artery calcifications. Cholelithiasis.    LUMBAR SPINE CT:  VERTEBRA: Mild levoscoliosis. Otherwise vertebral alignment anatomic. Normal vertebral body heights. No fracture or posttraumatic subluxation. Osteopenia.    CANAL/FORAMINA: Marked degenerative disc disease L4-L5. No high-grade central canal stenosis. Mild left L2-L3, mild to moderate bilateral L3-L4, and marked left and moderate right L4-L5 foraminal narrowing due to facet hypertrophic changes.    PARASPINAL: No extraspinal abnormality.      Impression    IMPRESSION:  THORACIC SPINE CT:  1.  No fracture or posttraumatic subluxation.  2.  Degenerative changes with moderate to marked foraminal narrowing from T8 to T11 as described above.    LUMBAR SPINE CT:  1.  No fracture or posttraumatic subluxation.  2.  Degenerative changes with marked left L4-L5 foraminal  narrowing.     CT Thoracic Spine w/o Contrast    Narrative    EXAM: CT LUMBAR SPINE W/O CONTRAST, CT THORACIC SPINE W/O CONTRAST  LOCATION: [facility]  DATE: 9/22/2024    INDICATION: fall, pain, ? injury  COMPARISON: None.  TECHNIQUE:  1) Routine CT Thoracic Spine without IV contrast. Multiplanar reformats. Dose reduction techniques were used.   2) Routine CT Lumbar Spine without IV contrast. Multiplanar reformats. Dose reduction techniques were used.     FINDINGS:    THORACIC SPINE CT:  VERTEBRA: Mild dextroscoliosis. Otherwise vertebral alignment anatomic. Normal vertebral body heights. No fracture or posttraumatic subluxation. Diffuse idiopathic skeletal hyperostosis. Osteopenia.    CANAL/FORAMINA: Mild multilevel degenerative disc disease. Small partially calcified right subarticular disc protrusion at T5-T6 indents the ventral thecal sac. No high-grade central canal stenosis. Moderate left T8-T9, moderate bilateral T9-T10, and   marked left and moderate right T10-T11 foraminal narrowing due to facet hypertrophic changes. There is less pronounced mild foraminal narrowing at a few additional levels.    PARASPINAL: Mild atherosclerosis thoracic aorta. Coronary artery calcifications. Cholelithiasis.    LUMBAR SPINE CT:  VERTEBRA: Mild levoscoliosis. Otherwise vertebral alignment anatomic. Normal vertebral body heights. No fracture or posttraumatic subluxation. Osteopenia.    CANAL/FORAMINA: Marked degenerative disc disease L4-L5. No high-grade central canal stenosis. Mild left L2-L3, mild to moderate bilateral L3-L4, and marked left and moderate right L4-L5 foraminal narrowing due to facet hypertrophic changes.    PARASPINAL: No extraspinal abnormality.      Impression    IMPRESSION:  THORACIC SPINE CT:  1.  No fracture or posttraumatic subluxation.  2.  Degenerative changes with moderate to marked foraminal narrowing from T8 to T11 as described above.    LUMBAR SPINE  CT:  1.  No fracture or posttraumatic subluxation.  2.  Degenerative changes with marked left L4-L5 foraminal narrowing.     Head CT w/o contrast    Narrative    EXAM: CT HEAD W/O CONTRAST  LOCATION: [facility]  DATE: 9/23/2024    INDICATION: Already received contrast today, protocol appropriately for such; fall, anticoagulated, repeat head CT.  COMPARISON: 09/22/2024.  TECHNIQUE: Routine CT Head without IV contrast. Multiplanar reformats. Dose reduction techniques were used.    FINDINGS:  INTRACRANIAL CONTENTS: No intracranial hemorrhage, extraaxial collection, or mass effect. Chronic infarctions within the occipital lobes bilaterally. Mild presumed chronic small vessel ischemic changes. Mild generalized volume loss. No hydrocephalus.     VISUALIZED ORBITS/SINUSES/MASTOIDS: No intraorbital abnormality. No paranasal sinus mucosal disease. No middle ear or mastoid effusion.    BONES/SOFT TISSUES: No acute abnormality.      Impression    IMPRESSION:  1.  No acute intracranial process.

## 2024-09-23 NOTE — PROGRESS NOTES
Resident/Fellow Attestation   I, Lc Curtis MD, was present with the medical/JANELLE student who participated in the service and in the documentation of the note.  I have verified the history and personally performed the physical exam and medical decision making.  I agree with the assessment and plan of care as documented in the note.      Lc Curtis MD  PGY2  Date of Service (when I saw the patient): 09/23/24    [facility]    Progress Note - Medicine Service, [facility] TEAM 3       Date of Admission:  9/22/2024    Assessment & Plan   Clarke Moreira is a 74 year old male with T2DM w/ neuropathy and chronic ulcers, pAF, HFpEF, HLD, MDD, on CPAP and uses walker at home presents after 2 syncopal episodes and admitted for sepsis of unclear origin, now on empiric IV antibiotics     Today  - continue vanc/zosyn pending blood culture results  - podiatry consult  - dental hygiene consult and CT dental  - PT/OT consult  - additional 1L LR bolus  - held pta torsemide and Kcl  -MRSA nares pending      Sepsis, source unclear but suspect Skin vs dental  Syncopal episodes x2 9/22  Patient presented after syncopal episodes with rigors and leukocytosis of 17.3 with leftward ANC shift. Lactate 3.4. UA bland without dysuria. These episodes are most likely vasovagal given the context of hypotension and a hot/humid apartment as well as prodrome (light headedness/ dizziness).  Cardiac causes of syncope are less likely but still possible given the patient's first episode occurred when raising his arm (coronary steal) and EKG findings of Afib w RVR and nonspecific ST/T wave abnormalities. Neuro causes of syncope less likely given negative head imaging and no evidence of tongue biting though seizures cannot be completely ruled out. Pt pan-scanned orthopaedically to rule out injuries 2/2 syncope without acute findings.   - Blood cultures pending; no growth after 12 hours.  - MRSA  amy pending  - Vanc/zosyn started empirically; ID will specify antibiotic treatment after infectious growth is identified.   - IVF: Given patient's body habitus, consider giving more fluids as not fully fluid resuscitated  - Dental hygiene requested dental CT wo Contrast. Goal is to determine source of  sepsis. Dental caries were a prior source of sepsis  - Podiatry completed wound cleansing at bedside  - PT/OT consult ordered  - on cardiac monitoring    BUE Tremors, paroxysmal   Patient presented with two episodes of afebrile tremors. 11pm 9/22 nurse noted afebrile pale tremors. Around noon 9/23 patient had afebrile tremors w tachycardia; tachycardia rapidly improved (160->104bpm) with deep breathing exercises. Patient stated he was feeling overheated and anxious(was afebrile). EKG demonstrated no acute changes ( Afib w RVR, chronic issue). These tremors are most likely related to patient's anxiety given (1) patient reported anxiety and (2) had a rapid response to deep breathing. Rigors related to infection are less likely (given patient's rapid response to deep breathing exercises and improvement in infectious Sx) but still possible.  -Hydroxyzine 50mg q6h ordered    MICHELLE on CKD 3a, improving  Admit Cr. 1.51, baseline Cr 1. Likely in setting of sepsis and hypovolemia  - continue hydration as above  - Ordered bladder scan  - Avoid NSAIDs  - CTM, BMP     Anion gap metabolic acidosis  Lactic acidosis likely 2/2 sepsis  Likely elevated in setting of rising lactate 2.7->3.4   - s/p 1L IV LR bolus today  - recheck lactate in PM    New microcytic Anemia  Admit Hgb 11.6. Iron was 14 and ferritin 28.  - daily hgb check  - Iron supplementation after infection r/o     Type 2 diabetes w neuropathy  Chronic ulcerations  Onychomycosis  Patient with open erosions/ulcerations on dorsum of feet, unclear if there prior to fall, possible source of bacteremia  - HOLD metformin 500 mg daily     Paroxysmal atrial  fibrillation  Elevated INR  Admit INR 3.12  - PTA metoprolol succinate 100 mg daily  - warfarin, pharmacy to dose goal 2-3     HFpEF  - HOLD PTA Kcl 20 meq daily in setting of sepsis  - HOLD PTA torsemide 100 mg daily in setting of sepsis  - PTA spironolactone 25 mg daily     Hypothyroidism  - PTA levothyroxine 175 mcg daily     HLD  - atorvastatin 40 mg     BPH  - PTA tamsulosin 0.4 mg daily     Chronic hypercapnic respiratory failure  - CPAP at night     MDD  CHRIS  Hx active suicidal ideation  Frequent psychology visits. On risk assessment today, patient denies suicidal intent or plan. Patient says that as he ages, his desire for self-harm decreases as his end would be a natural process due to old age.        Diet: Regular Diet Adult    DVT Prophylaxis: Warfarin  Bzazi Catheter: Not present  Fluids: None  Lines: None     Cardiac Monitoring: ACTIVE order. Indication: syncope  Code Status: Full Code      Clinically Significant Risk Factors Present on Admission         # Hyponatremia: Lowest Na = 134 mmol/L in last 2 days, will monitor as appropriate  # Hypocalcemia: Lowest Ca = 8.5 mg/dL in last 2 days, will monitor and replace as appropriate   # Hypomagnesemia: Lowest Mg = 1.6 mg/dL in last 2 days, will replace as needed  # Anion Gap Metabolic Acidosis: Highest Anion Gap = 19 mmol/L in last 2 days, will monitor and treat as appropriate   # Drug Induced Coagulation Defect: home medication list includes an anticoagulant medication  # Drug Induced Platelet Defect: home medication list includes an antiplatelet medication   # Hypertension: Noted on problem list    # Anemia: based on hgb <11      # DMII: A1C = 6.6 % (Ref range: 0.0 - 5.6 %) within past 6 months               Disposition Plan      Expected Discharge Date: 09/25/2024        Discharge Comments: pending infection rule out and PT/OT recs        The patient's care was discussed with the Attending Physician, Dr. Lopez .    Teresa Oseguera  "Student  Medicine Service, [facility] TEAM 3  [facility]  Securely message with [employer] (more info)  Text page via [employer] Paging/Directory   See signed in provider for up to date coverage information  ______________________________________________________________________    Interval History   Overnight, nurse reported patient was pale and shaking with 97.5 F.     Per patient, yesterday he \"toppled and fell over\" two times. Patient stated his apartment has been hot/humid    8:30 AM: Patient did not feel light headed or dizzy. He did not feel any warning signs that he was going to topple. He reached up to write on a calendar and subsequently fell over; he did not lose consciousness. He felt weak and had heart palpitations afterwards.     3:30PM: Patient felt light headed and dizzy before \"toppling over.\" He was conscious during the episode of falling over. He felt weak and had heart palpitations afterwards.     AM Progress Notes  Since being at the hospital, patient states he overall feels much better today than he did yesterday. Additionally, patient has felt  a 6/10 headache (10 being the most severe). His headache started and was worst when he first entered the hospital but has since improved. He pointed to his bilateral frontal lobes and jaw when asked where the headache pains him. He attributes these symptoms to bright hospital lights and anxiety     Patient has also experienced nausea (denied vomiting) since being at the hospital. His last meal was yesterday morning. He has been able to drink water with ease. He attributes his current symptoms to anxiety.     Afternoon Progress Notes:  Around noon patient had an episode of tremors and tachypnea (HR increased to 160). Patient denied chest pain and stated he was watching TV before the onset of this episode of tremors. Temperature during this episode was non-febrile. HR quickly decreased to 104 as patient engaged in deep " breathing exercises.     ROS (positives  not included above):     Neurological:  Patient also endorsed neck stiffness over night (resolved this morning).   He states he has numbness/tingling of his feet and fingers    Psychiatric: Patient endorsed a history of depression and described having anxiety while in the hospital  Physical Exam   Vital Signs: Temp: 98.1  F (36.7  C) Temp src: Oral BP: 108/56 Pulse: 88   Resp: 16 SpO2: 98 % O2 Device: None (Room air)       General: obese,no acute distress  Eyes: no icterus, no conjunctival injection, no papilledema  ENT: no erythema, no tongue biting, poor dentition  Cardiac: regular rate/rhythm, no murmurs/rubs/gallops. 1+ pitting edema of bilateral lower legs and feet.   Pulmonary: wheezes/rales/rhonchi of right middle and lower lobes; otherwise clear to auscultation   Skin: dry scaly skin over legs bilaterally and ecchymoses over knee. Open small abrasion wounds over toes bilaterally.  Mental Status: awake and alert    Medical Decision Making       Please see A&P for additional details of medical decision making.      Data     I have personally reviewed the following data over the past 24 hrs:    11.0  \   10.8 (L)   / 350     138 99 22.2 /  128 (H)   3.6 23 1.35 (H) \     ALT: 17 AST: 45 AP: 97 TBILI: 0.7   ALB: 3.6 TOT PROTEIN: 6.6 LIPASE: N/A     Trop: 25 (H) BNP: N/A     TSH: N/A T4: N/A A1C: N/A     Procal: N/A CRP: N/A Lactic Acid: 3.4 (H)       INR:  3.46 (H) PTT:  N/A   D-dimer:  N/A Fibrinogen:  N/A     Ferritin:  28 (L) % Retic:  N/A LDH:  N/A       Imaging results reviewed over the past 24 hrs:   Recent Results (from the past 24 hour(s))   XR Knee Bilateral 1/2 Views    Narrative    EXAM: XR KNEE BILATERAL 1/2 VIEWS  LOCATION: [facility]  DATE: 9/22/2024    INDICATION: Fall; pain, bruising and swelling.  COMPARISON: None.      Impression    IMPRESSION:   RIGHT KNEE: Questionable minimally displaced age-indeterminate  fracture of the proximal metaphysis and epiphysis of the fibula. Clinical correlation needed with regard to the location of the patient's symptoms. Moderate medial compartment osteoarthrosis.   Small effusion. Minimal patellofemoral osteoarthrosis.    LEFT KNEE: Minimal patellofemoral osteoarthrosis. Otherwise negative.   Head CT w/o contrast    Narrative    EXAM: CT HEAD W/O CONTRAST  LOCATION: [facility]  DATE: 9/22/2024    INDICATION: Falls, weakness, anticoagulated, ? seizure  COMPARISON: 3/6/2019  TECHNIQUE: Routine CT Head without IV contrast. Multiplanar reformats. Dose reduction techniques were used.    FINDINGS:  INTRACRANIAL CONTENTS: No intracranial hemorrhage, extraaxial collection, or mass effect.  No CT evidence of acute infarct. Mild presumed chronic small vessel ischemic changes. Mild generalized volume loss. No hydrocephalus. Encephalomalacia in the left   parieto-occipital distribution is stable from 2019.     VISUALIZED ORBITS/SINUSES/MASTOIDS: No intraorbital abnormality. No paranasal sinus mucosal disease. No middle ear or mastoid effusion.    BONES/SOFT TISSUES: No acute abnormality.      Impression    IMPRESSION:  1.  No acute findings. No acute intracranial hemorrhage. No acute calvarial fracture.   CT Cervical Spine w/o Contrast    Narrative    EXAM: CT CERVICAL SPINE W/O CONTRAST  LOCATION: [facility]  DATE: 9/22/2024    INDICATION: fall, neck pain  COMPARISON: None.  TECHNIQUE: Routine CT Cervical Spine without IV contrast. Multiplanar reformats. Dose reduction techniques were used.    FINDINGS:  Vertebral body heights are maintained. Multilevel mild loss of disc height. No anterolisthesis or retrolisthesis. No jumped or perched facets. Spinous processes are intact.    No acute cervical spine fracture.    Soft tissues of the neck are unremarkable. Lung apices are unremarkable.      Impression     IMPRESSION:  1.  No acute cervical spine fracture.   CT Chest Pulmonary Embolism w Contrast    Narrative    EXAM: CT CHEST PULMONARY EMBOLISM W CONTRAST  LOCATION: [facility]  DATE: 9/22/2024    INDICATION: ? pe   possible LOC, tachycardia, falls, ? PE, infection et al  COMPARISON: None.  TECHNIQUE: CT chest pulmonary angiogram during arterial phase injection of IV contrast. Multiplanar reformats and MIP reconstructions were performed. Dose reduction techniques were used.   CONTRAST: 77ml Isovue 370    FINDINGS:  ANGIOGRAM CHEST: Pulmonary arteries are normal caliber and negative for pulmonary emboli. Thoracic aorta is negative for dissection. No CT evidence of right heart strain.    LUNGS AND PLEURA: Normal.    MEDIASTINUM/AXILLAE: Normal.    CORONARY ARTERY CALCIFICATION: None.    UPPER ABDOMEN: Multiple calcified gallstones.    MUSCULOSKELETAL: Normal.      Impression    IMPRESSION:  1.  No pulmonary embolism.    2.  Multiple calcified gallstones.   XR Ankle Bilateral 2 Views    Narrative    EXAM: XR ANKLE BILATERAL 2 VIEWS, XR FOOT BILATERAL 2 VIEWS  LOCATION: [facility]  DATE: 9/22/2024    INDICATION: pain, falls  COMPARISON: None.      Impression    IMPRESSION:     Diffuse osteopenia.    Acute or subacute intra-articular fracture of the medial base of the first proximal phalanx.    Chronic appearing deformity of the left proximal phalanx.    Intramedullary nail in the right fibula, incompletely imaged.    Small plantar calcaneal enthesophytes bilaterally.    No dislocation.    Vascular calcifications.   XR Foot Bilateral 2 Views    Narrative    EXAM: XR ANKLE BILATERAL 2 VIEWS, XR FOOT BILATERAL 2 VIEWS  LOCATION: [facility]  DATE: 9/22/2024    INDICATION: pain, falls  COMPARISON: None.      Impression    IMPRESSION:     Diffuse osteopenia.    Acute or subacute  intra-articular fracture of the medial base of the first proximal phalanx.    Chronic appearing deformity of the left proximal phalanx.    Intramedullary nail in the right fibula, incompletely imaged.    Small plantar calcaneal enthesophytes bilaterally.    No dislocation.    Vascular calcifications.   CT Abdomen Pelvis w/o Contrast    Narrative    EXAM: CT ABDOMEN PELVIS W/O CONTRAST  LOCATION: [facility]  DATE: 9/22/2024    INDICATION: ? source for sepsis, falls  COMPARISON: 1/16/2007  TECHNIQUE: CT scan of the abdomen and pelvis was performed without IV contrast. Multiplanar reformats were obtained. Dose reduction techniques were used.  CONTRAST: None.    FINDINGS:   LOWER CHEST: Coronary calcification. Lung bases are clear.    HEPATOBILIARY: Normal contour with no significant mass. No bile duct dilatation. Calcified gallstones.    PANCREAS: Normal.    SPLEEN: Normal.    ADRENAL GLANDS: Normal.    KIDNEYS/BLADDER: Contrast in the collecting systems from recent IV contrast administration. This slightly limits assessment for calculi; no calculi identified. No hydronephrosis.     BOWEL: No obstruction or inflammatory change. No free air, free fluid, fluid collection.    LYMPH NODES: Normal.    VASCULATURE: No abdominal aortic aneurysm. Mild left chronic calcification.    PELVIC ORGANS: Normal.    MUSCULOSKELETAL: Degenerative changes of the spine and hips.      Impression    IMPRESSION:   1.  No acute process in the abdomen or pelvis. No source of sepsis identified.  2.  Cholelithiasis.     CT Lumbar Spine w/o Contrast    Narrative    EXAM: CT LUMBAR SPINE W/O CONTRAST, CT THORACIC SPINE W/O CONTRAST  LOCATION: [facility]  DATE: 9/22/2024    INDICATION: fall, pain, ? injury  COMPARISON: None.  TECHNIQUE:  1) Routine CT Thoracic Spine without IV contrast. Multiplanar reformats. Dose reduction techniques were used.   2) Routine  CT Lumbar Spine without IV contrast. Multiplanar reformats. Dose reduction techniques were used.     FINDINGS:    THORACIC SPINE CT:  VERTEBRA: Mild dextroscoliosis. Otherwise vertebral alignment anatomic. Normal vertebral body heights. No fracture or posttraumatic subluxation. Diffuse idiopathic skeletal hyperostosis. Osteopenia.    CANAL/FORAMINA: Mild multilevel degenerative disc disease. Small partially calcified right subarticular disc protrusion at T5-T6 indents the ventral thecal sac. No high-grade central canal stenosis. Moderate left T8-T9, moderate bilateral T9-T10, and   marked left and moderate right T10-T11 foraminal narrowing due to facet hypertrophic changes. There is less pronounced mild foraminal narrowing at a few additional levels.    PARASPINAL: Mild atherosclerosis thoracic aorta. Coronary artery calcifications. Cholelithiasis.    LUMBAR SPINE CT:  VERTEBRA: Mild levoscoliosis. Otherwise vertebral alignment anatomic. Normal vertebral body heights. No fracture or posttraumatic subluxation. Osteopenia.    CANAL/FORAMINA: Marked degenerative disc disease L4-L5. No high-grade central canal stenosis. Mild left L2-L3, mild to moderate bilateral L3-L4, and marked left and moderate right L4-L5 foraminal narrowing due to facet hypertrophic changes.    PARASPINAL: No extraspinal abnormality.      Impression    IMPRESSION:  THORACIC SPINE CT:  1.  No fracture or posttraumatic subluxation.  2.  Degenerative changes with moderate to marked foraminal narrowing from T8 to T11 as described above.    LUMBAR SPINE CT:  1.  No fracture or posttraumatic subluxation.  2.  Degenerative changes with marked left L4-L5 foraminal narrowing.     CT Thoracic Spine w/o Contrast    Narrative    EXAM: CT LUMBAR SPINE W/O CONTRAST, CT THORACIC SPINE W/O CONTRAST  LOCATION: [facility]  DATE: 9/22/2024    INDICATION: fall, pain, ? injury  COMPARISON: None.  TECHNIQUE:  1) Routine CT  Thoracic Spine without IV contrast. Multiplanar reformats. Dose reduction techniques were used.   2) Routine CT Lumbar Spine without IV contrast. Multiplanar reformats. Dose reduction techniques were used.     FINDINGS:    THORACIC SPINE CT:  VERTEBRA: Mild dextroscoliosis. Otherwise vertebral alignment anatomic. Normal vertebral body heights. No fracture or posttraumatic subluxation. Diffuse idiopathic skeletal hyperostosis. Osteopenia.    CANAL/FORAMINA: Mild multilevel degenerative disc disease. Small partially calcified right subarticular disc protrusion at T5-T6 indents the ventral thecal sac. No high-grade central canal stenosis. Moderate left T8-T9, moderate bilateral T9-T10, and   marked left and moderate right T10-T11 foraminal narrowing due to facet hypertrophic changes. There is less pronounced mild foraminal narrowing at a few additional levels.    PARASPINAL: Mild atherosclerosis thoracic aorta. Coronary artery calcifications. Cholelithiasis.    LUMBAR SPINE CT:  VERTEBRA: Mild levoscoliosis. Otherwise vertebral alignment anatomic. Normal vertebral body heights. No fracture or posttraumatic subluxation. Osteopenia.    CANAL/FORAMINA: Marked degenerative disc disease L4-L5. No high-grade central canal stenosis. Mild left L2-L3, mild to moderate bilateral L3-L4, and marked left and moderate right L4-L5 foraminal narrowing due to facet hypertrophic changes.    PARASPINAL: No extraspinal abnormality.      Impression    IMPRESSION:  THORACIC SPINE CT:  1.  No fracture or posttraumatic subluxation.  2.  Degenerative changes with moderate to marked foraminal narrowing from T8 to T11 as described above.    LUMBAR SPINE CT:  1.  No fracture or posttraumatic subluxation.  2.  Degenerative changes with marked left L4-L5 foraminal narrowing.     Head CT w/o contrast    Narrative    EXAM: CT HEAD W/O CONTRAST  LOCATION: [facility]  DATE: 9/23/2024    INDICATION: Already  received contrast today, protocol appropriately for such; fall, anticoagulated, repeat head CT.  COMPARISON: 09/22/2024.  TECHNIQUE: Routine CT Head without IV contrast. Multiplanar reformats. Dose reduction techniques were used.    FINDINGS:  INTRACRANIAL CONTENTS: No intracranial hemorrhage, extraaxial collection, or mass effect. Chronic infarctions within the occipital lobes bilaterally. Mild presumed chronic small vessel ischemic changes. Mild generalized volume loss. No hydrocephalus.     VISUALIZED ORBITS/SINUSES/MASTOIDS: No intraorbital abnormality. No paranasal sinus mucosal disease. No middle ear or mastoid effusion.    BONES/SOFT TISSUES: No acute abnormality.      Impression    IMPRESSION:  1.  No acute intracranial process.   CT Dental wo Contrast    Impression    RESIDENT PRELIMINARY INTERPRETATION  IMPRESSION:  1. No periapical abscess identified.  2. Extensive dental hardware with streak artifact partially limits  evaluation of the teeth. Scattered dental caries present, most  pronounced in the right mandibular cuspid and right maxillary lateral  incisor. Fractured versus surgically extracted left posterior  maxillary molar with residual root present.

## 2024-09-23 NOTE — CONSULTS
"Podiatry Consult Note    Date: September 23, 2024      ASSESSMENT/PLAN:  Patient seen and evaluated at bedside today.     I cleansed the abrasion areas and evaluated. They are superficial and without signs of infection.   No other wound areas noted  Overall poor pedal hygiene    Can paint abrasions with betadine and leave open to air.       Surgery: None  Activity: No pedal restrictions  Antibiotics: per team/ID  Diet: per team  Dressing: Podiatry will complete  Dispo: pending, per primary team PT/OT    OBJECTIVE:    IMAGING:  EXAM: XR ANKLE BILATERAL 2 VIEWS, XR FOOT BILATERAL 2 VIEWS  LOCATION: [facility]  DATE: 9/22/2024     INDICATION: pain, falls  COMPARISON: None.                                                                      IMPRESSION:      Diffuse osteopenia.     Acute or subacute intra-articular fracture of the medial base of the first proximal phalanx.     Chronic appearing deformity of the left proximal phalanx.     Intramedullary nail in the right fibula, incompletely imaged.     Small plantar calcaneal enthesophytes bilaterally.     No dislocation.     Vascular calcifications.    BP (!) 75/63   Pulse (!) 151   Temp 97.6  F (36.4  C) (Axillary)   Resp 20   SpO2 91%     Lab Results   Component Value Date    WBC 11.0 09/23/2024    WBC 11.1 05/06/2021     Lab Results   Component Value Date    RBC 4.57 09/23/2024    RBC 5.45 05/06/2021     Lab Results   Component Value Date    HGB 10.8 09/23/2024    HGB 16.3 05/06/2021     Lab Results   Component Value Date    HCT 34.3 09/23/2024    HCT 49.4 05/06/2021     No components found for: \"MCT\"  Lab Results   Component Value Date    MCV 75 09/23/2024    MCV 91 05/06/2021     Lab Results   Component Value Date    MCH 23.6 09/23/2024    MCH 29.5 05/06/2021     Lab Results   Component Value Date    MCHC 31.5 09/23/2024    MCHC 32.6 05/06/2021     Lab Results   Component Value Date    RDW 14.8 09/23/2024    RDW " 13.6 05/06/2021     Lab Results   Component Value Date     09/23/2024     05/06/2021       Sed Rate   Date Value Ref Range Status   11/09/2020 19 0 - 20 mm/hr Final     CRP Inflammation   Date Value Ref Range Status   09/22/2024 <3.00 <5.00 mg/L Final         PHYSICAL EXAM:    General:    Derm: Skin is shiny, dry, atropic, cool, with dry crusting and scaling BLE. Has abrasions to tops of toes as noted in picture below, no active bleed, does not probe to bone, no purulence, fluctuance or redness here.   Diffuse ecchymosis to lower legs and feet likely secondary to fall.                 Vascular:  DP pulse is Difficult to palpate.   PT pulse is difficult to palpate with edema and adipose.   Cap refill is sluggish to toes b/l foot  Pedal hair is absent b/l foot      MSK:  MMT is can move foot and ankle and wiggle toes.     Neuro: Gross sensation is absent via light touch to pedal nerve branches b/l        HPI:  Patient is a 74 year old male pmhx of T2DM with neuropathy and chronic ulcers, pAF, HFpEF, HLD, MDD, on CPAP had multiple episodes of syncope and fall, found to be septic on admission with GNR bacteremia. Podiatry consult to rule out source from foot. Patient denies open wound to feet but states with falls may have abrasions and bruises. Denies drainage from foot. No other pedal issues other than swelling, numbness and tingling.       Past Medical History:   Diagnosis Date    Atrial fibrillation (H)     Basal cell carcinoma     Chronic anticoagulation     Diastolic heart failure (H)     Dyslipidemia     Essential hypertension     Morbid obesity (H)     Sleep-disordered breathing     Type 2 diabetes mellitus (H)      Social Connections: Not on file        Allergies   Allergen Reactions    Seasonal Allergies      Past Surgical History:   Procedure Laterality Date    BIOPSY OF SKIN LESION      MOHS MICROGRAPHIC PROCEDURE      PICC INSERTION Right 09/24/2017    5fr TL Bard PICC, 51cm (1cm external) in  the R medial brachial vein w/ tip in the SVC.     Family History   Problem Relation Age of Onset    Depression Mother     Glaucoma Maternal Grandfather     Cancer No family hx of         No family history of skin cancer    Macular Degeneration No family hx of

## 2024-09-24 LAB
ANION GAP SERPL CALCULATED.3IONS-SCNC: 11 MMOL/L (ref 7–15)
BUN SERPL-MCNC: 15.7 MG/DL (ref 8–23)
CALCIUM SERPL-MCNC: 8.5 MG/DL (ref 8.8–10.4)
CHLORIDE SERPL-SCNC: 103 MMOL/L (ref 98–107)
CREAT SERPL-MCNC: 1.11 MG/DL (ref 0.67–1.17)
EGFRCR SERPLBLD CKD-EPI 2021: 70 ML/MIN/1.73M2
ERYTHROCYTE [DISTWIDTH] IN BLOOD BY AUTOMATED COUNT: 15.1 % (ref 10–15)
GLUCOSE SERPL-MCNC: 129 MG/DL (ref 70–99)
HCO3 SERPL-SCNC: 22 MMOL/L (ref 22–29)
HCT VFR BLD AUTO: 32.4 % (ref 40–53)
HGB BLD-MCNC: 10.1 G/DL (ref 13.3–17.7)
INR PPP: 3.76 (ref 0.85–1.15)
MAGNESIUM SERPL-MCNC: 1.8 MG/DL (ref 1.7–2.3)
MCH RBC QN AUTO: 23.8 PG (ref 26.5–33)
MCHC RBC AUTO-ENTMCNC: 31.2 G/DL (ref 31.5–36.5)
MCV RBC AUTO: 76 FL (ref 78–100)
MRSA DNA SPEC QL NAA+PROBE: NEGATIVE
PLATELET # BLD AUTO: 281 10E3/UL (ref 150–450)
POTASSIUM SERPL-SCNC: 3.6 MMOL/L (ref 3.4–5.3)
RBC # BLD AUTO: 4.25 10E6/UL (ref 4.4–5.9)
SA TARGET DNA: NEGATIVE
SODIUM SERPL-SCNC: 136 MMOL/L (ref 135–145)
VIT D+METAB SERPL-MCNC: 20 NG/ML (ref 20–50)
WBC # BLD AUTO: 10.3 10E3/UL (ref 4–11)

## 2024-09-24 PROCEDURE — 96366 THER/PROPH/DIAG IV INF ADDON: CPT | Performed by: EMERGENCY MEDICINE

## 2024-09-24 PROCEDURE — G0463 HOSPITAL OUTPT CLINIC VISIT: HCPCS

## 2024-09-24 PROCEDURE — 80048 BASIC METABOLIC PNL TOTAL CA: CPT

## 2024-09-24 PROCEDURE — 99232 SBSQ HOSP IP/OBS MODERATE 35: CPT | Mod: GC | Performed by: INTERNAL MEDICINE

## 2024-09-24 PROCEDURE — 87641 MR-STAPH DNA AMP PROBE: CPT

## 2024-09-24 PROCEDURE — 120N000002 HC R&B MED SURG/OB UMMC

## 2024-09-24 PROCEDURE — 85610 PROTHROMBIN TIME: CPT

## 2024-09-24 PROCEDURE — 96365 THER/PROPH/DIAG IV INF INIT: CPT | Mod: 59 | Performed by: EMERGENCY MEDICINE

## 2024-09-24 PROCEDURE — 250N000013 HC RX MED GY IP 250 OP 250 PS 637

## 2024-09-24 PROCEDURE — 36415 COLL VENOUS BLD VENIPUNCTURE: CPT

## 2024-09-24 PROCEDURE — 83735 ASSAY OF MAGNESIUM: CPT

## 2024-09-24 PROCEDURE — 99233 SBSQ HOSP IP/OBS HIGH 50: CPT | Mod: GC | Performed by: INTERNAL MEDICINE

## 2024-09-24 PROCEDURE — 999N000157 HC STATISTIC RCP TIME EA 10 MIN

## 2024-09-24 PROCEDURE — 85027 COMPLETE CBC AUTOMATED: CPT

## 2024-09-24 PROCEDURE — 250N000011 HC RX IP 250 OP 636

## 2024-09-24 RX ORDER — UREA 8.5 G/85G
CREAM TOPICAL 2 TIMES DAILY PRN
Status: DISCONTINUED | OUTPATIENT
Start: 2024-09-24 | End: 2024-10-03 | Stop reason: HOSPADM

## 2024-09-24 RX ORDER — LANOLIN ALCOHOL/MO/W.PET/CERES
CREAM (GRAM) TOPICAL
Status: DISCONTINUED | OUTPATIENT
Start: 2024-09-24 | End: 2024-10-03 | Stop reason: HOSPADM

## 2024-09-24 RX ADMIN — SPIRONOLACTONE 50 MG: 50 TABLET ORAL at 08:14

## 2024-09-24 RX ADMIN — ATORVASTATIN CALCIUM 40 MG: 40 TABLET, FILM COATED ORAL at 08:14

## 2024-09-24 RX ADMIN — PIPERACILLIN AND TAZOBACTAM 4.5 G: 4; .5 INJECTION, POWDER, LYOPHILIZED, FOR SOLUTION INTRAVENOUS at 16:32

## 2024-09-24 RX ADMIN — PIPERACILLIN AND TAZOBACTAM 4.5 G: 4; .5 INJECTION, POWDER, LYOPHILIZED, FOR SOLUTION INTRAVENOUS at 10:24

## 2024-09-24 RX ADMIN — OXYCODONE HYDROCHLORIDE AND ACETAMINOPHEN 1000 MG: 500 TABLET ORAL at 08:14

## 2024-09-24 RX ADMIN — LEVOTHYROXINE SODIUM 175 MCG: 0.05 TABLET ORAL at 08:15

## 2024-09-24 RX ADMIN — B-COMPLEX W/ C & FOLIC ACID TAB 1 TABLET: TAB at 08:14

## 2024-09-24 RX ADMIN — TAMSULOSIN HYDROCHLORIDE 0.4 MG: 0.4 CAPSULE ORAL at 08:14

## 2024-09-24 RX ADMIN — PIPERACILLIN AND TAZOBACTAM 4.5 G: 4; .5 INJECTION, POWDER, LYOPHILIZED, FOR SOLUTION INTRAVENOUS at 04:54

## 2024-09-24 RX ADMIN — METOPROLOL SUCCINATE 100 MG: 50 TABLET, EXTENDED RELEASE ORAL at 08:13

## 2024-09-24 ASSESSMENT — ACTIVITIES OF DAILY LIVING (ADL)
ADLS_ACUITY_SCORE: 47
ADLS_ACUITY_SCORE: 49
ADLS_ACUITY_SCORE: 49
ADLS_ACUITY_SCORE: 47
ADLS_ACUITY_SCORE: 47
ADLS_ACUITY_SCORE: 49
ADLS_ACUITY_SCORE: 49
ADLS_ACUITY_SCORE: 47
ADLS_ACUITY_SCORE: 49
ADLS_ACUITY_SCORE: 47
ADLS_ACUITY_SCORE: 45
ADLS_ACUITY_SCORE: 49
ADLS_ACUITY_SCORE: 49
ADLS_ACUITY_SCORE: 45
ADLS_ACUITY_SCORE: 47
ADLS_ACUITY_SCORE: 49
ADLS_ACUITY_SCORE: 45
ADLS_ACUITY_SCORE: 47
ADLS_ACUITY_SCORE: 49

## 2024-09-24 NOTE — PROGRESS NOTES
Neuro: A&Ox4.   Cardiac: Afib with RVR. HR 90-110s.   Respiratory: Sating >92% on RA.  GI/: Adequate urine output. Uses bedside commode.  Diet/appetite: Tolerating regular diet. Eating well.  Activity:  Assist of 1-2, up to chair and bedside commode.  Pain: At acceptable level on current regimen.   Skin: See flowsheets.  LDA's: 2x PIV, R arm, SL.    Plan: Floor orders to 6C. Continue with POC. Notify Maroon 3 with changes.

## 2024-09-24 NOTE — PROGRESS NOTES
Resident/Fellow Attestation   I, Lc Curtis MD, was present with the medical/JANELLE student who participated in the service and in the documentation of the note.  I have verified the history and personally performed the physical exam and medical decision making.  I agree with the assessment and plan of care as documented in the note.        Lc Curtis MD  PGY2  Date of Service (when I saw the patient): 09/24/24    [facility]    Progress Note - Medicine Service, [facility] TEAM 3       Date of Admission:  9/22/2024    Assessment & Plan   Clarke Moreira is a 74 year old male with T2DM w/ neuropathy and chronic ulcers, pAF, HFpEF, HLD, MDD, on CPAP and uses walker at home presents after 2 syncopal episodes and admitted for sepsis of unclear origin, now on empiric IV antibiotics      Today:   - Pending Blood cultures  -Wound care consulted- recommended Lymphedema consult   -Awaiting PT/OT recs  -vitamin D nl and calcium low nl  -Will consider restarting Torsemide tomorrow.    Sepsis, source unclear but suspect Skin vs dental, improving   Patient presented after syncopal episodes with rigors and leukocytosis of 17.3 with leftward ANC shift and Lactate 3.4. UA bland without dysuria. CTAP with cholelithiasis, otherwise unremarkable. No respiratory sxs. MRSA nares negative. Suspect source from skin abrasions vs dental vs bacterimia, however Dental CT demonstrated no signs of abscess. S/p IVF and on empiric abx  - Blood Cx 9/22: NGTD 24 hours.  - Vanc/zosyn started empirically; 9/22-p   - will discontinue tomorrow pending cultures  - ID onboard with management  - Wound care consulted for LE wounds   - lymphedema consult   - wound cares  - Podiatry consulted: s/p wound cleansing at bedside     Pre Syncopal episodes x2 9/22  No LOC with falls. These episodes are most likely vasovagal given the context of hypotension and a hot/humid apartment as well as prodrome of  light headedness and dizziness.  Cardiac causes of syncope are less likely but still possible given the patient's first episode occurred when raising his arm (coronary steal) and EKG findings of Afib w RVR and nonspecific ST/T wave abnormalities. Neuro causes of syncope less likely given negative head imaging and no evidence of tongue biting though seizures cannot be completely ruled out. Pt pan-scanned orthopaedically to rule out injuries 2/2 syncope without acute findings.   - Follow up with PT/OT   - on cardiac monitoring    New microcytic Anemia- stable  Admit Hgb 11.6. Iron was 14 and ferritin 28.  - daily hgb check  - consider start Iron supplementation prior discharge     Type 2 diabetes w neuropathy  Chronic ulcerations  Onychomycosis  Patient with open erosions/ulcerations on dorsum of feet, unclear if there prior to fall, possible source of bacteremia  - HOLD metformin 500 mg daily     Paroxysmal atrial fibrillation  Elevated INR  Admit INR 3.12  - PTA metoprolol succinate 100 mg daily  - warfarin, pharmacy to dose goal 2-3     HFpEF  - HOLD PTA Kcl 20 meq daily in setting of sepsis  - HOLD PTA torsemide 100 mg daily in setting of sepsis  - PTA spironolactone 25 mg daily    BUE Tremors, paroxysmal  Patient had three episodes of afebrile tremors. 11pm 9/22 nurse noted afebrile pale tremors. Around noon 9/23 patient had afebrile tremors w tachycardia; tachycardia rapidly improved (160->104bpm) with deep breathing exercises. Patient stated he was feeling overheated and anxious. 9/24 early morning patient had tremors; improved with application of 3 warm blankets. EKG demonstrated no acute changes ( Afib w RVR, chronic issue). These tremors are most likely related to patient's anxiety given (1) patient reported anxiety and (2) had a rapid response to deep breathing. Rigors related to infection are less likely (given patient's rapid response to deep breathing exercises and improvement in infectious Sx) but still  possible.  -Hydroxyzine 50mg q6h ordered     Diffuse Osteopenia   Osteopenia noted on foot and ankle x-ray on admission. Acute or subacute intra-articular fracture of the medial base of the first proximal phalanx.   -Checked Vitamin D levels- normal  - Calcium low-normal (8.5)  [] Follow up outpatient with PCP or Endocrine    Resolved issues and chronic issues:   #Anion gap metabolic acidosis due to lactic acidosis -resolved    #MICHELLE on CKD 3a, resolved  Admit Cr. 1.51, baseline Cr 1. Likely in setting of sepsis and hypovolemia  - continue hydration as above  - bladder scan prn  - Avoid NSAIDs  - CTM, BMP    Hypothyroidism  - PTA levothyroxine 175 mcg daily     HLD  - atorvastatin 40 mg     BPH  - PTA tamsulosin 0.4 mg daily     Chronic hypercapnic respiratory failure  - CPAP at night      MDD  CHRIS  Hx active suicidal ideation  Frequent psychology visits. On risk assessment today, patient denies suicidal intent or plan. Patient says that as he ages, his desire for self-harm decreases as his end would be a natural process due to old age.    Diet: Regular Diet Adult    DVT Prophylaxis: Warfarin  Bazzi Catheter: Not present  Fluids: None  Lines: None     Cardiac Monitoring: ACTIVE order. Indication: syncope  Code Status: Full Code      Clinically Significant Risk Factors         # Hyponatremia: Lowest Na = 134 mmol/L in last 2 days, will monitor as appropriate  # Hypocalcemia: Lowest Ca = 8.5 mg/dL in last 2 days, will monitor and replace as appropriate   # Hypomagnesemia: Lowest Mg = 1.6 mg/dL in last 2 days, will replace as needed  # Anion Gap Metabolic Acidosis: Highest Anion Gap = 19 mmol/L in last 2 days, will monitor and treat as appropriate      # Hypertension: Noted on problem list          # DMII: A1C = 6.6 % (Ref range: 0.0 - 5.6 %) within past 6 months, PRESENT ON ADMISSION             Disposition Plan      Expected Discharge Date: 09/25/2024        Discharge Comments: pending infection rule out and PT/OT  recs        The patient's care was discussed with the Attending Physician, Dr. Sergio Morales  Medical Student  Medicine Service, [facility] TEAM 3  [facility]  Securely message with [employer] (more info)  Text page via [employer] Paging/Directory   See signed in provider for up to date coverage information  ______________________________________________________________________    Interval History   Overnight, patient had another episode of afebrile tremors; nurse placed 3 warm  blankets on him and his condition improved. Patient stated he is overall feeling better but is having some difficulty sleeping as healthcare team wakes him up in the middle of the night.     His bilateral frontal lobe headache improved from a 5-6/10 yesterday to 2-3/10 today (10 being the most severe). Patient no longer has nausea and has an appetite. He had his first meal since admission this morning.   He denied dysuria, hematuria, bloody stools, painful bowel movements, vomiting, and chills.     ROS: Pertinent ROS included above    Physical Exam   Vital Signs: Temp: 98.7  F (37.1  C) Temp src: Oral BP: 124/64 Pulse: 119   Resp: 18 SpO2: 96 % O2 Device: None (Room air)    Weight: 0 lbs 0 oz    General: obese,no acute distress  Eyes: no icterus, no conjunctival injection, no papilledema  Cardiac: regular rate/rhythm, no murmurs/rubs/gallops. 1+ pitting edema of bilateral lower legs and feet.   Pulmonary: clear to auscultation   Skin: dry scaly skin over legs bilaterally and ecchymoses over knee. Open small abrasion wounds over toes bilaterally.  Mental Status: awake and alert      Medical Decision Making       Please see A&P for additional details of medical decision making.    Sodium- 136; Potassium- 3.6; Cl-103; Bicarb-22; BUN-15.7; Creatinine-1.11  WBC-10.3; Hgb- 10.1; Hematocrit- 32.4; Platelet-281

## 2024-09-24 NOTE — PROGRESS NOTES
General Infectious Disease Service  [facility] Team  Patient:  Clarke Moreira  Date of birth 1950  Medical record number [id number]  Date of Admission: 9/22/2024  Date of Visit:  9/24/2024  Requesting Provider: Torsten Hill         Assessment and Recommendations     Problem List:    Rigors & lactic acidosis   Hypotension - likely due to dehydration  New microcytic anemia    Discussion:    Based on the patient s presentation and current clinical course, there are no convincing signs of infection at this time. He has remained afebrile throughout his admission, and his initial leukocytosis has resolved. His mildly elevated inflammatory markers are likely explained by the episodes of falling that resulted in knee injuries that are evident upon physical examination.     While there was an initial report of blood cultures growing Citrobacter, this now appears to have been a misunderstanding, as the cultures have shown no growth. There are no localizing symptoms of infection, and podiatry s evaluation of his chronic diabetic ulcers revealed only superficial wounds with no evidence of infection.     It is possible that volume depletion may have been contributing to his syncopal episodes and tenuous blood pressure, as this seem to have improved with IV volume repletion. Given this, would consider re-evaluating his current diuretic regimen. His mild lactic acidosis is more likely attributable to metformin use rather than hypoperfusion (it resolved nonetheless). He also has new microcytic anemia that needs to be worked up. Additionally, patient reported that he is actively looking for the ways to harm/kill himself, and he should be assessed for suicidal ideation.      Given that the blood cultures remain negative and no imaging findings are suggestive of ongoing infection, we recommend discontinuing antibiotics. There is no clear indication for their continuation in the absence of an identifiable infectious  source.    Plan:    - please discontinue antibiotics   - anemia workup and other medical problem workup per primary team   - please assess for suicidal ideation and consult psychiatry if appropriate    Recommendations discussed with supervising attending physician, Dr. Fagan. Plan is communicated to the primary team.    Thank you for this consult. The General ID team will sign off. Please feel free to call with any questions.     Ki Chu MD   Infectious Disease Fellow   Date of Service: 9/24/2024  Available on [employer]        Antibiotics & Cultures, Consolidated     Cultures:      9/22 blood cultures - negative to date      Antibiotics:      Van + zosyn 9/22 - present         Interval History     Remains well, no fevers or other episodes of shakiness.          Physical Exam     /64 (BP Location: Left arm)   Pulse 119   Temp 98.7  F (37.1  C) (Oral)   Resp 18   SpO2 96%      Exam:  General: obese,no acute distress  Eyes: no icterus, no conjunctival injection, no papilledema  ENT: no erythema, no tongue biting, poor dentition  Cardiac: regular rate/rhythm, no murmurs/rubs/gallops. 1+ pitting edema of bilateral lower legs and feet.   Pulmonary: wheezes/rales/rhonchi of right middle and lower lobes; otherwise clear to auscultation   Skin: dry scaly skin over legs bilaterally and ecchymoses over knee. Open small abrasion wounds over toes bilaterally.  Mental Status: awake and alert

## 2024-09-24 NOTE — CONSULTS
[facility]  WO Nurse Inpatient Assessment     Consulted for: knees, lower legs, toes    Summary: During evaluation, Kristina requested WOCto observe lower legs. Podiatry consult for bilateral feet. Notified Medicine Resident, requested modified consult and made recommendations    Patient History (according to provider note(s):      Clarke Moreira is a 74 year old male with T2DM w/ neuropathy and chronic ulcers, pAF, HFpEF, HLD, MDD, on CPAP and uses walker at home presents after 2 syncopal episodes and admitted for sepsis 2/2 GNR (Citrobacter) bacteremia, now on IV antibiotics     Sepsis 2/2 Citrobacter bacteremia  Syncopal episodes x2 9/22  Patient presented after syncopal episode with rigors and leukocytosis of 17.3 with leftward ANC shift. UA bland without dysuria. Neuro causes of syncope less likely given negative head imaging though seizures cannot be completely ruled out. Cardiac causes of syncope less likely given unremarkable EKG findings and patient with recent cardiac workup for HFpEF and CTPE negative. Pt pan-scanned orthopaedically to rule out injuries 2/2 syncope without acute findings. Blood cultures positive for GNR - Citrobacter, speciation pending.  - Vanc/zosyn started empirically, consulted ID regarding starting cefepime in light of citrobacter, please re-page in AM  - Fluid resuscitation, received 1L in ED, ordered second 1L LR bolus. Given patient's body habitus, consider giving more fluids as not fully fluid resuscitated  - Dental hygiene consulted given sepsis of unclear source with dental caries being prior source of sepsis  - Podiatry consult given open wounds and sepsis of unclear source in patient with chronic diabetic ulcers and history of great toe osteomyelitis       Assessment:      Areas visualized during today's visit: Focused:, Lower extremities , and Bilateral knees    Wound location: Right knee and Right lower leg, Right toes            Last photo: 9/24/2024  Wound type: Trauma and pressure, abrasion            Wound history/plan of care:   patient has had multiple falls at home where he reports having to drag or crawl across carpet and vinyl fela for multiple feet which took approx an hour to an hour and a half. Large intact purple hematoma formation under skin with centralized horizontal skin split and abrasion to 9 and 11 o'clock. Right lower leg with hemosiderin staining, cracked and hardened hyperkeratosis, scattered scabs and bruising. Toes and foot with thickened nails, hammer toes continuation of dry cracked skin bruising and abrasions to great and 2nd toes, appears related to trauma from fall and dragging toes across floor, dried drainage present    Wound base: knee: 25 % Dermis and Moist, 75 % Non-blanchable, Purple, and hematoma  Toes: 5% scattered scab, 95% dermis, moist     Palpation of the wound bed: normal      Drainage: scant     Description of drainage: serosanguinous     Measurements (length x width x depth, in cm) total area 7 x 11 x 0.2cm,   linear open area to central wound: 10.3 x 1.2 x 0.2cm  Toes: 1.5 x 1x0.1cm    Periwound skin: Intact      Color: normal and consistent with surrounding tissue      Temperature: normal   Odor: none  Pain: mild, tender  Pain intervention prior to dressing change: patient tolerated well, no significant pain present , soaking, and slow and gentle cares   Treatment goal: Heal , Drainage control, Infection control/prevention, Maintain (prevention of deterioration), Protection, Promote epidermal migration, and Simplify plan of care  STATUS: initial assessment  Supplies ordered: gathered, supplies stored on unit, and discussed with patient       Wound location: Left knee, left lower leg and left toes           Last photo: 9/24/2024  Wound type: Trauma and pressure, abrasion            Wound history/plan of care:   patient has had multiple falls at home where he reports having to drag or  crawl across carpet and vinyl fela for multiple feet which took approx an hour to an hour and a half. Large intact purple hematoma formation under skin with large abrasion open to dermis at 2-5 o'clock. Scattered scabs to lower left leg, hemosiderin staining and cracked and hardened hyperkeratosis. Toes and foot with thickened nails, hammer toes continuation of dry cracked skin. Abrasions to great, 2nd, 3rd and 4th toes, pink tissue surrounding the abrasions related to trauma from fall and dragging toes across floor  Wound base: knee: 50 % Dermis and Moist, 50% Non-blanchable, Purple, and hematoma,   Toes: 95%dermis, moist, 5% scattered scabs     Palpation of the wound bed: normal      Drainage: scant     Description of drainage: serosanguinous     Measurements (length x width x depth, in cm)  hematoma: 4 x 5 x 0cm, Open area: 3.5 x 3.2x  0.2cm    Periwound skin: Intact      Color: normal and consistent with surrounding tissue      Temperature: normal   Odor: none  Pain: mild, tender  Pain intervention prior to dressing change: patient tolerated well, no significant pain present , soaking, and slow and gentle cares   Treatment goal: Heal , Drainage control, Infection control/prevention, Maintain (prevention of deterioration), Protection, Promote epidermal migration, and Simplify plan of care  STATUS: initial assessment  Supplies ordered: gathered, supplies stored on unit, and discussed with patient         My PI Risk Assessment     Sensory Perception: 3 - Slightly Limited     Moisture: 3 - Occasionally moist      Activity: 3 - Walks occasionally      Mobility: 3 - Slightly limited      Nutrition: 3 - Adequate     Friction/Shear: 3 - No apparent problem      TOTAL: 18        Treatment Plan:     Bilateral knees: Every 3 days  Cleanse with Vashe wound cleanser and allow to dry  Paint periwound skin with no sting skin barrier wipe  Cover open  areas with Xeroform guaze and apply a mepilex foam dressing on  "top.    Bilateral lower legs from below knees to toes: beginning 9/24/24 and then with Lymphedema wrap changes  Moisten 2 rolls  of kerlex with Vashe cleanser, wring out excess  Wrap each leg from toes to just below knees and  soak for 10 minutes.  Apply Atractain/Urea lotion (see EMAR) to tops of toes (not in between toes), feet and to lower legs just below knees.   Apply [place] dressings to cover abrasions on tops of toes    Upper extremities and generalized body: Daily and PRN  After bathing, apply sween cream or body lotion to maintain skin integrity and provide moisture to dry skin    Pressure Injury Prevention (PIP) Plan:  If patient is declining pressure injury prevention interventions: Explore reason why and address patient's concerns, Educate on pressure injury risk and prevention intervention(s), If patient is still declining, document \"informed refusal\" , and Ensure Care team is aware ( provider, charge nurse, etc)  Mattress: Follow bed algorithm, add Low Air Loss (Air+) mattress pump if skin is very moist or constantly moist.   HOB: Maintain at or below 30 degrees, unless contraindicated  Repositioning in bed: Every 1-2 hours , Left/right positioning; avoid supine, Raise foot of bed prior to raising head of bed, to reduce patient sliding down (shear), and Frequent microturns using positioning wedges, as patient tolerates  Heels: Keep elevated off mattress, Pillows under calves, and Heel lift boots  Protective Dressing: Sacral Mepilex for prevention (#[id number]),  especially for the agitated patient   Positioning Equipment:None  Chair positioning: Chair cushion (#[id number]) , Repositions self: patient to shift weight every 15 minutes, Assist patient to reposition hourly, Do NOT use a donut for sitting (this increases pressure to smaller area and creates a higher potential for injury) , and Order Bariatric chair cushion    If patient has a buttock pressure injury, or high risk for PI use chair cushion or " SPS.  Moisture Management: Perineal cleansing /protection: Follow Incontinence Protocol, Avoid brief in bed, Clean and dry skin folds with bathing , Consider InterDry (#[id number]) between folds, and Moisturize dry skin  Under Devices: Inspect skin under all medical devices during skin inspection , Ensure tubes are stabilized without tension, and Ensure patient is not lying on medical devices or equipment when repositioned  Ask provider to discontinue device when no longer needed.    Orders: Written    RECOMMEND PRIMARY TEAM ORDER: Lymphedema consult and Podiatry consult  Education provided: importance of repositioning, plan of care, wound progress, Infection prevention , Moisture management, Hygiene, Off-loading pressure, and self cares  Discussed plan of care with: Patient and Physician  WO nurse follow-up plan: weekly  Notify WOC if wound(s) deteriorate.  Nursing to notify the Provider(s) and re-consult the WO Nurse if new skin concern.    DATA:     Current support surface: Bariatric Bariatric low air loss   Containment of urine/stool: Incontinence Protocol and Incontinent pad in bed  BMI: There is no height or weight on file to calculate BMI.   Active diet order: Orders Placed This Encounter      Combination Diet Regular Diet     Output: I/O last 3 completed shifts:  In: -   Out: 1200 [Urine:1200]     Labs:   Recent Labs   Lab 09/24/24  0826 09/23/24  0638   ALBUMIN  --  3.6   HGB 10.1* 10.8*   INR 3.76* 3.46*   WBC 10.3 11.0     Pressure injury risk assessment:   Sensory Perception: 4-->no impairment  Moisture: 3-->occasionally moist  Activity: 2-->chairfast  Mobility: 2-->very limited  Nutrition: 2-->probably inadequate  Friction and Shear: 3-->no apparent problem  Peterson Score: 16    Azeb Campbell, RN, BSN, CWOCN   Pager no longer is use, please contact through [employer] group: [facility] Nurse [place]  Dept. Office Number: [phone]

## 2024-09-24 NOTE — PLAN OF CARE
/60   Pulse 109   Temp 98.4  F (36.9  C) (Oral)   Resp 18   SpO2 98%     [id number]    A&Ox4, denied pain at rest. No prn pain med needed this shift. Bed bound this shift. Can turn in bed with minimal assist. Right knee bruised/blister/hematoma and left knee bruises. Scabbing on bilateral toes/feet. Vs stable on room air and afebrile. Patient refused to use CPAP when RT offered him. SR/ST HR 90-100s. On Zosyn and Vancomycin. LR 1000 ml bolus. Bed changed to bariatric standard.  PIV x 2 saline locked b/n abx. External cath with adequate urine output. CT dental done. MRSA swap sent and resulted negative. Will continue to monitor.

## 2024-09-25 ENCOUNTER — APPOINTMENT (OUTPATIENT)
Dept: OCCUPATIONAL THERAPY | Facility: CLINIC | Age: 74
DRG: 812 | End: 2024-09-25
Payer: COMMERCIAL

## 2024-09-25 LAB
ANION GAP SERPL CALCULATED.3IONS-SCNC: 8 MMOL/L (ref 7–15)
BUN SERPL-MCNC: 12.7 MG/DL (ref 8–23)
CA-I BLD-MCNC: 4.6 MG/DL (ref 4.4–5.2)
CALCIUM SERPL-MCNC: 8.2 MG/DL (ref 8.8–10.4)
CHLORIDE SERPL-SCNC: 105 MMOL/L (ref 98–107)
CREAT SERPL-MCNC: 0.99 MG/DL (ref 0.67–1.17)
EGFRCR SERPLBLD CKD-EPI 2021: 80 ML/MIN/1.73M2
ERYTHROCYTE [DISTWIDTH] IN BLOOD BY AUTOMATED COUNT: 15 % (ref 10–15)
GLUCOSE BLDC GLUCOMTR-MCNC: 141 MG/DL (ref 70–99)
GLUCOSE BLDC GLUCOMTR-MCNC: 145 MG/DL (ref 70–99)
GLUCOSE SERPL-MCNC: 116 MG/DL (ref 70–99)
HCO3 SERPL-SCNC: 25 MMOL/L (ref 22–29)
HCT VFR BLD AUTO: 29.9 % (ref 40–53)
HGB BLD-MCNC: 8.9 G/DL (ref 13.3–17.7)
INR PPP: 2.2 (ref 0.85–1.15)
MAGNESIUM SERPL-MCNC: 2 MG/DL (ref 1.7–2.3)
MCH RBC QN AUTO: 22.9 PG (ref 26.5–33)
MCHC RBC AUTO-ENTMCNC: 29.8 G/DL (ref 31.5–36.5)
MCV RBC AUTO: 77 FL (ref 78–100)
PHOSPHATE SERPL-MCNC: 2.3 MG/DL (ref 2.5–4.5)
PHOSPHATE SERPL-MCNC: 2.3 MG/DL (ref 2.5–4.5)
PLATELET # BLD AUTO: 245 10E3/UL (ref 150–450)
POTASSIUM SERPL-SCNC: 3.3 MMOL/L (ref 3.4–5.3)
POTASSIUM SERPL-SCNC: 3.5 MMOL/L (ref 3.4–5.3)
RBC # BLD AUTO: 3.88 10E6/UL (ref 4.4–5.9)
SODIUM SERPL-SCNC: 138 MMOL/L (ref 135–145)
WBC # BLD AUTO: 6.9 10E3/UL (ref 4–11)

## 2024-09-25 PROCEDURE — 82330 ASSAY OF CALCIUM: CPT

## 2024-09-25 PROCEDURE — 83735 ASSAY OF MAGNESIUM: CPT

## 2024-09-25 PROCEDURE — 258N000003 HC RX IP 258 OP 636

## 2024-09-25 PROCEDURE — 250N000013 HC RX MED GY IP 250 OP 250 PS 637

## 2024-09-25 PROCEDURE — 99232 SBSQ HOSP IP/OBS MODERATE 35: CPT | Performed by: STUDENT IN AN ORGANIZED HEALTH CARE EDUCATION/TRAINING PROGRAM

## 2024-09-25 PROCEDURE — 250N000009 HC RX 250

## 2024-09-25 PROCEDURE — 250N000013 HC RX MED GY IP 250 OP 250 PS 637: Performed by: STUDENT IN AN ORGANIZED HEALTH CARE EDUCATION/TRAINING PROGRAM

## 2024-09-25 PROCEDURE — 85610 PROTHROMBIN TIME: CPT

## 2024-09-25 PROCEDURE — 999N000157 HC STATISTIC RCP TIME EA 10 MIN

## 2024-09-25 PROCEDURE — 97535 SELF CARE MNGMENT TRAINING: CPT | Mod: GO | Performed by: OCCUPATIONAL THERAPIST

## 2024-09-25 PROCEDURE — 97530 THERAPEUTIC ACTIVITIES: CPT | Mod: GO | Performed by: OCCUPATIONAL THERAPIST

## 2024-09-25 PROCEDURE — 84100 ASSAY OF PHOSPHORUS: CPT

## 2024-09-25 PROCEDURE — 80048 BASIC METABOLIC PNL TOTAL CA: CPT

## 2024-09-25 PROCEDURE — 97166 OT EVAL MOD COMPLEX 45 MIN: CPT | Mod: GO | Performed by: OCCUPATIONAL THERAPIST

## 2024-09-25 PROCEDURE — 85027 COMPLETE CBC AUTOMATED: CPT

## 2024-09-25 PROCEDURE — 36415 COLL VENOUS BLD VENIPUNCTURE: CPT

## 2024-09-25 PROCEDURE — 120N000002 HC R&B MED SURG/OB UMMC

## 2024-09-25 PROCEDURE — 84132 ASSAY OF SERUM POTASSIUM: CPT

## 2024-09-25 RX ORDER — FAMOTIDINE 20 MG
1 TABLET ORAL DAILY
COMMUNITY

## 2024-09-25 RX ORDER — FERROUS SULFATE 325(65) MG
325 TABLET ORAL EVERY OTHER DAY
Status: DISCONTINUED | OUTPATIENT
Start: 2024-09-25 | End: 2024-10-03 | Stop reason: HOSPADM

## 2024-09-25 RX ORDER — METOPROLOL TARTRATE 1 MG/ML
5 INJECTION, SOLUTION INTRAVENOUS EVERY 5 MIN PRN
Status: COMPLETED | OUTPATIENT
Start: 2024-09-25 | End: 2024-10-01

## 2024-09-25 RX ORDER — TORSEMIDE 100 MG/1
100 TABLET ORAL DAILY
Status: DISCONTINUED | OUTPATIENT
Start: 2024-09-25 | End: 2024-10-03 | Stop reason: HOSPADM

## 2024-09-25 RX ORDER — WARFARIN SODIUM 10 MG/1
10 TABLET ORAL
Status: COMPLETED | OUTPATIENT
Start: 2024-09-25 | End: 2024-09-25

## 2024-09-25 RX ADMIN — TORSEMIDE 100 MG: 100 TABLET ORAL at 11:06

## 2024-09-25 RX ADMIN — WARFARIN SODIUM 10 MG: 10 TABLET ORAL at 18:10

## 2024-09-25 RX ADMIN — FERROUS SULFATE TAB 325 MG (65 MG ELEMENTAL FE) 325 MG: 325 (65 FE) TAB at 11:07

## 2024-09-25 RX ADMIN — TAMSULOSIN HYDROCHLORIDE 0.4 MG: 0.4 CAPSULE ORAL at 07:40

## 2024-09-25 RX ADMIN — B-COMPLEX W/ C & FOLIC ACID TAB 1 TABLET: TAB at 07:40

## 2024-09-25 RX ADMIN — SPIRONOLACTONE 50 MG: 50 TABLET ORAL at 07:40

## 2024-09-25 RX ADMIN — LEVOTHYROXINE SODIUM 175 MCG: 0.05 TABLET ORAL at 07:40

## 2024-09-25 RX ADMIN — POTASSIUM CHLORIDE 40 MEQ: 750 TABLET, EXTENDED RELEASE ORAL at 21:06

## 2024-09-25 RX ADMIN — METOPROLOL SUCCINATE 100 MG: 50 TABLET, EXTENDED RELEASE ORAL at 07:40

## 2024-09-25 RX ADMIN — OXYCODONE HYDROCHLORIDE AND ACETAMINOPHEN 1000 MG: 500 TABLET ORAL at 07:40

## 2024-09-25 RX ADMIN — POTASSIUM CHLORIDE 40 MEQ: 750 TABLET, EXTENDED RELEASE ORAL at 11:06

## 2024-09-25 RX ADMIN — ATORVASTATIN CALCIUM 40 MG: 40 TABLET, FILM COATED ORAL at 07:40

## 2024-09-25 RX ADMIN — POTASSIUM PHOSPHATE, MONOBASIC POTASSIUM PHOSPHATE, DIBASIC 9 MMOL: 224; 236 INJECTION, SOLUTION, CONCENTRATE INTRAVENOUS at 16:41

## 2024-09-25 ASSESSMENT — ACTIVITIES OF DAILY LIVING (ADL)
ADLS_ACUITY_SCORE: 41
DIFFICULTY_COMMUNICATING: NO
ADLS_ACUITY_SCORE: 38
ADLS_ACUITY_SCORE: 38
CONCENTRATING,_REMEMBERING_OR_MAKING_DECISIONS_DIFFICULTY: NO
ADLS_ACUITY_SCORE: 41
WEAR_GLASSES_OR_BLIND: YES
ADLS_ACUITY_SCORE: 38
TOILETING_ISSUES: NO
CHANGE_IN_FUNCTIONAL_STATUS_SINCE_ONSET_OF_CURRENT_ILLNESS/INJURY: NO
ADLS_ACUITY_SCORE: 41
PREVIOUS_RESPONSIBILITIES: HOUSEKEEPING;LAUNDRY;SHOPPING;MEDICATION MANAGEMENT;FINANCES
DIFFICULTY_EATING/SWALLOWING: NO
VISION_MANAGEMENT: GLASES
ADLS_ACUITY_SCORE: 41
ADLS_ACUITY_SCORE: 38
ADLS_ACUITY_SCORE: 38
ADLS_ACUITY_SCORE: 49
ADLS_ACUITY_SCORE: 38
DRESSING/BATHING_DIFFICULTY: NO
WALKING_OR_CLIMBING_STAIRS: AMBULATION DIFFICULTY, ASSISTANCE 1 PERSON
ADLS_ACUITY_SCORE: 38
ADLS_ACUITY_SCORE: 41
FALL_HISTORY_WITHIN_LAST_SIX_MONTHS: YES
DOING_ERRANDS_INDEPENDENTLY_DIFFICULTY: YES
NUMBER_OF_TIMES_PATIENT_HAS_FALLEN_WITHIN_LAST_SIX_MONTHS: 2
ADLS_ACUITY_SCORE: 41
ADLS_ACUITY_SCORE: 38
HEARING_DIFFICULTY_OR_DEAF: NO
WALKING_OR_CLIMBING_STAIRS_DIFFICULTY: YES
ADLS_ACUITY_SCORE: 41
ADLS_ACUITY_SCORE: 41
ADLS_ACUITY_SCORE: 49
EQUIPMENT_CURRENTLY_USED_AT_HOME: GRAB BAR, TOILET;GRAB BAR, TUB/SHOWER

## 2024-09-25 NOTE — PROVIDER NOTIFICATION
Paged Pop Fleming MD to notify of sustained HR in 120-150 at rest and up to 180s when up to edge of bed. Tele reading afib, AM meds given an hour ago.     Response- team is rounding at bedside now and put in PRN IV metoprolol for HR >140.

## 2024-09-25 NOTE — PLAN OF CARE
Goal Outcome Evaluation:      Plan of Care Reviewed With: patient    Overall Patient Progress: no changeOverall Patient Progress: no change    Outcome Evaluation: electrolyte replacement, PT/OT    Assumed cares [id number]. A&O and able to make needs known. Tachy HR; other VSS on RA. Tele reads afib. Pt asymptomatic. PRN metoprolol available for HR > 140. Pt reports BLE pain, but declined all offer for pain medication. Worked with PT today, but HR was up into the 180s up to edge of bed, so session was limited. Torsemide restarted and having high urine output using external catheter. No BM this shift. RN managed Phosphorus replacement ordered, but unable to run protocol d/t not being able to get a weight on pt at this time. Unable to stand, and bariatric bed does not have bed weight function. Paged primary MD to notify of this and to please order phosphorus replacement as they see fit in the meantime. Good appetite at meals. Continue with plan of care.

## 2024-09-25 NOTE — PROGRESS NOTES
"   09/25/24 1000   Appointment Info   Signing Clinician's Name / Credentials (OT) Letty Villanueva OTR/L   Rehab Comments (OT) Edema and OT Evaluation   Quick Adds   Quick Adds Edema   Living Environment   People in Home alone   Current Living Arrangements apartment   Home Accessibility wheelchair accessible   Transportation Anticipated public transportation  ([employer])   Living Environment Comments Pt reports living on one level apartment. Has 2WW for community mobility and SPC in apt for navigation. Reports using walls to assist with walking in apartment as the 2WW \"doesn't fit everywhere\".   Self-Care   Usual Activity Tolerance fair   Current Activity Tolerance poor   Regular Exercise No   Equipment Currently Used at Home grab bar, toilet;grab bar, tub/shower;walker, standard;cane, straight   Fall history within last six months yes   Number of times patient has fallen within last six months 2   Activity/Exercise/Self-Care Comment Pt reports IND with ADLs at baseline. Doesn't use the tub/shower anymore 2/2 fear of transferring. Completes sink baths in kitchen and BR. Reports his last full shower was in the hospital.   Instrumental Activities of Daily Living (IADL)   Previous Responsibilities housekeeping;laundry;shopping;medication management;finances   IADL Comments Pt reports use of [employer] for transportation and open arms for food with minimal supplementation.   General Information   Onset of Illness/Injury or Date of Surgery 09/22/24   Referring Physician Lc Curtis MD   Patient/Family Therapy Goal Statement (OT) To go home safely   Additional Occupational Profile Info/Pertinent History of Current Problem Clarke Moreira is a 74 year old male with T2DM w/ neuropathy and chronic ulcers, pAF, HFpEF, HLD, MDD, on CPAP and uses walker at home presents after 2 syncopal episodes and admitted for sepsis of unclear origin, now on empiric IV antibiotics   Existing Precautions/Restrictions " fall   Left Upper Extremity (Weight-bearing Status) full weight-bearing (FWB)   Right Upper Extremity (Weight-bearing Status) full weight-bearing (FWB)   Left Lower Extremity (Weight-bearing Status) full weight-bearing (FWB)   Right Lower Extremity (Weight-bearing Status) full weight-bearing (FWB)   General Observations and Info Up with assist   Cognitive Status Examination   Orientation Status orientation to person, place and time   Visual Perception   Visual Impairment/Limitations WNL   Edema General Information   Onset of Edema 09/11/24   Affected Body Part(s) Left LE;Right LE   Edema Etiology Chronic Venous Insfficiency   Etiology Comments Pt reporting past two weeks of edema, acute on chronic   General Comments/Previous Edema Treatment/Edema Equipment Pt presents with trace to no edema in jen LEs from MTPs to knee creases. Reports significant improvemnent since admission.   Edema Examination/Assessment   Skin Condition Non-pitting;Dryness   Skin Condition Comments Pt presents with flakiness, multiple ulcers and bruises noted from fall throughout jen LEs, bruises on jen knees.   Scar Yes   Ulcerations Yes   Stemmer Sign Negative   Pitting Assessment Presents with trace-no edema at this time from MTPs to knee creases.   Posture   Posture forward head position   Range of Motion Comprehensive   Comment, General Range of Motion Pain in jen LEs impacting ROM   Strength Comprehensive (MMT)   Comment, General Manual Muscle Testing (MMT) Assessment Pt presents with generalized deconditioning. Reports consistently becoming weaker over the past two weeks.   Muscle Tone Assessment   Muscle Tone Quick Adds No deficits were identified   Coordination   Upper Extremity Coordination No deficits were identified   Bed Mobility   Bed Mobility rolling left;supine-sit;sit-supine   Rolling Left [place] (Bed Mobility) maximum assist (25% patient effort);2 person assist   Supine-Sit [place] (Bed Mobility) maximum assist (25%  patient effort);2 person assist   Sit-Supine [place] (Bed Mobility) dependent (less than 25% patient effort);2 person assist   Transfers   Transfers bed-chair transfer;sit-stand transfer;toilet transfer   Transfer Skill: Bed to Chair/Chair to Bed   Bed-Chair [place] (Transfers) dependent (less than 25% patient effort);2 person assist   Transfer Comments Per clinical judgment   Sit-Stand Transfer   Sit-Stand [place] (Transfers) 2 person assist;maximum assist (25% patient effort)   Sit/Stand Transfer Comments Per clinical judgment   Toilet Transfer   Type (Toilet Transfer) stand-sit;sit-stand   [place] Level (Toilet Transfer) dependent (less than 25% patient effort);2 person assist   Toilet Transfer Comments Per clinical judgment   Activities of Daily Living   BADL Assessment/Intervention bathing;upper body dressing;lower body dressing;grooming;toileting   Bathing Assessment/Intervention   [place] Level (Bathing) bathing skills;maximum assist (25% patient effort)   Comment, (Bathing) Per clinical judgment   Upper Body Dressing Assessment/Training   Comment, (Upper Body Dressing) Per clinical judgment   [place] Level (Upper Body Dressing) minimum assist (75% patient effort)   Lower Body Dressing Assessment/Training   [place] Level (Lower Body Dressing) dependent (less than 25% patient effort)   Grooming Assessment/Training   [place] Level (Grooming) minimum assist (75% patient effort);grooming skills   Comment, (Grooming) Per clinical judgment   Toileting   Comment, (Toileting) Per clinical judgment   [place] Level (Toileting) toileting skills;moderate assist (50% patient effort)   Clinical Impression   Criteria for Skilled Therapeutic Interventions Met (OT) Yes, treatment indicated   OT Diagnosis Impaired I/ADLs   OT Problem List-Impairments impacting ADL problems related to;activity tolerance impaired;balance;mobility;pain;strength;range of motion (ROM)   Assessment of  Occupational Performance 5 or more Performance Deficits   Identified Performance Deficits functional mobility, functional transfers, dressing, grooming, toileting, bathing   Planned Therapy Interventions (OT) IADL retraining;ADL retraining;strengthening;transfer training;home program guidelines;progressive activity/exercise;risk factor education   Edema: Planned Interventions Edema exercises;Precautions to prevent infection/exacerbation;Education   Clinical Decision Making Complexity (OT) detailed assessment/moderate complexity   Risk & Benefits of therapy have been explained evaluation/treatment results reviewed;care plan/treatment goals reviewed;risks/benefits reviewed;current/potential barriers reviewed;participants voiced agreement with care plan;participants included;patient   OT Total Evaluation Time   OT Eval, Moderate Complexity Minutes (61655) 6   OT Goals   Therapy Frequency (OT) 5 times/week   OT Predicted Duration/Target Date for Goal Attainment 10/10/24   OT Goals Hygiene/Grooming;Upper Body Dressing;Lower Body Dressing;Upper Body Bathing;Lower Body Bathing;Toilet Transfer/Toileting;Aerobic Activity   OT: Hygiene/Grooming modified independent;while standing   OT: Upper Body Dressing Modified independent;including set-up/clothing retrieval   OT: Lower Body Dressing Modified independent;including set-up/clothing retrieval   OT: Upper Body Bathing Independent   OT: Lower Body Bathing Independent   OT: Toilet Transfer/Toileting Modified independent;cleaning and garment management;toilet transfer   OT: Perform aerobic activity with stable cardiovascular response 5 minutes;intermittent activity;ambulation   Interventions   Interventions Quick Adds Therapeutic Activity   Self-Care/Home Management   Self-Care/Home Mgmt/ADL, Compensatory, Meal Prep Minutes (23873) 9   Symptoms Noted During/After Treatment (Meal Preparation/Planning Training) none   Therapeutic Activities   Therapeutic Activity Minutes (21421) 40    Symptoms noted during/after treatment significant change in vital signs;shortness of breath;fatigue;increased pain   OT Discharge Planning   OT Plan Monitor HR, seated ADLs at EOB/EOC, OH lift bed <> chair, monitor for edema needs (currently no edema present)   OT Discharge Recommendation (DC Rec) Transitional Care Facility   OT Rationale for DC Rec Pt presenting below PLOF primarily limited by pain, elevated HR, act yazmin, mobility, and fatigue. Will benefit from rehab stay in TCU setting to promote safety and IND with mobility as well as ADLs. Would benefit from tub/shower bench at d/c to promote safety with showering and tub/shower tx. Lives alone at baseline.   OT Brief overview of current status Not OOB, Max Ax2 for supine <> sit EOB, extensive time required   Total Session Time   Timed Code Treatment Minutes 49   Total Session Time (sum of timed and untimed services) 55

## 2024-09-25 NOTE — PROGRESS NOTES
Physician Attestation   I, Elif Rockwell, was present with the medical/JANELLE student who participated in the service and in the documentation of the note.  I have verified the history and personally performed the physical exam and medical decision making.  I agree with the assessment and plan of care as documented in the note.      Deleon findings: Mr. Moreira endorses passive SI. States that he has had SI throughout his entire life and is following with a psychologist with ability to reach out if things worsen. He notes that he wants to die, but has no plan.   OT recommending TCU, will work with care management for placement.  Restarting diuretics today and continuing to replete electrolytes.    40 MINUTES SPENT BY ME on the date of service doing chart review, history, exam, documentation & further activities per the note.      Elif Rockwell  Date of Service (when I saw the patient): 09/25/24    [facility]    Progress Note - Medicine Service, [facility] TEAM 3       Date of Admission:  9/22/2024    Assessment & Plan   Clarke Moreira is a 74 year old male with T2DM w/ neuropathy and chronic ulcers, pAF, HFpEF, HLD, MDD, on CPAP and uses walker at home presents after 2 syncopal episodes and admitted for possible sepsis. There is no longer concern for sepsis; current concern for refeeding syndrome and Afib sustained RVR.    Today:   - Initiated Metoprolol 5mg IV PRN for Afib sustained RVR, HR>140  - started iron and phosphate supplementation for concern for refeeding syndrome  - resume pta Kcl, torsemide  - OT rec discarge to transitional care unit  - await PT rec  - psych consult canceled. Pt no longer endorses active SI and denied further mental health discussion.       Hypokalemia, Hypophosphatemia  Concern for refeeding syndrome   Current K+ is 3.3 9/25, dropped from 3.6 9/22-9/24. Phosphate 2.3.  Patient ate for the first time yesterday after 2 days of no food.  concern refeeding syndrome.  magnesium WNL (2.0).   -Un-held potassium chloride 40 meQ BID .    -Initiated phosphorus standard replacement protocol- RN Managed  -CTM    HFpEF  - restarted PTA torsemide 100 mg daily   - restarted PTA Kcl 40 meq BID   - PTA spironolactone 25 mg daily    New microcytic Anemia- stable  Patient reports last outpatient colonoscopy was <5 years ago and normal. He does not recall where the colonoscopy was performed.  Admit Hgb 11.6, currently 8.9. Iron was 14 and ferritin 28.  - daily hgb check  - start ferrous sulfate 325 mg supplementation  - [] consider outpatient colonoscopy and further workup     Pre Syncopal episodes x2 9/22  No LOC with falls. These episodes are most likely vasovagal given the context of hypotension and a hot/humid apartment as well as prodrome of light headedness and dizziness.  Cardiac causes of syncope are less likely but still possible given the patient's first episode occurred when raising his arm (coronary steal) and EKG findings of Afib w RVR and nonspecific ST/T wave abnormalities. Neuro causes of syncope less likely given negative head imaging and no evidence of tongue biting though seizures cannot be completely ruled out. Pt pan-scanned orthopaedically to rule out injuries 2/2 syncope without acute findings. OT discharge rec is transitional care facility  - Discuss with PT  - on cardiac monitoring    Type 2 diabetes w neuropathy  Chronic ulcerations  Onychomycosis  -WOC consulted  - HOLD metformin 500 mg daily     Paroxysmal atrial fibrillation  Elevated INR  Admit INR 3.12. EKG 9/23 continues to show Afib.   - PTA metoprolol succinate 100 mg daily  - warfarin, pharmacy to dose goal 2-3  - Initiated Metoprolol 5mg IV PRN for Afib sustained RVR, HR>140     BUE Tremors, paroxysmal  Patient had three episodes of afebrile tremors. 11pm 9/22 nurse noted afebrile pale tremors. Around noon 9/23 patient had afebrile tremors w tachycardia; tachycardia rapidly  improved (160->104bpm) with deep breathing exercises. Patient stated he was feeling overheated and anxious. 9/24 early morning patient had tremors; improved with application of 3 warm blankets. EKG demonstrated no acute changes ( Afib w RVR, chronic issue). These tremors are most likely related to patient's anxiety given (1) patient reported anxiety and (2) had a rapid response to deep breathing. Rigors related to infection are less likely (given patient's rapid response to deep breathing exercises and improvement in infectious Sx) but still possible.  -Hydroxyzine 50mg q6h ordered     Diffuse Osteopenia   Osteopenia noted on foot and ankle x-ray on admission. Acute or subacute intra-articular fracture of the medial base of the first proximal phalanx.   -Checked Vitamin D levels- normal  - Calcium low-normal (8.5)  [] Follow up outpatient with PCP or Endocrine     Resolved issues and chronic issues:     Passive suicidal ideation  Hx active suicidal ideation, MDD  Frequent psychology visits. On risk assessment at admission, patient denies suicidal intent or plan. Patient says that as he ages, his desire for self-harm decreases as his end would be a natural process due to old age. 9/24 patient endorsed active SI stating he is looking for ways to kill himself. 9/25 patient no longer has active SI and declined further mental health conversations    Concern for Sepsis, resolved   Patient presented after syncopal episodes with rigors and leukocytosis of 17.3 with leftward ANC shift and Lactate 3.4. UA bland without dysuria. CTAP with cholelithiasis, otherwise unremarkable. No respiratory sxs. MRSA nares negative. Suspect source from skin abrasions vs dental vs bacterimia, however Dental CT demonstrated no signs of abscess. S/p IVF and on empiric abx. ID stopped empiric abx 9/24  - Blood Cx 9/22: NGTD 24 hours.  - Vanc/zosyn started empirically; 9/22-9/24  - ID consulted  - Wound care consulted for LE wounds              -  lymphedema consult   - wound cares  - Podiatry consulted: s/p wound cleansing at bedside       #Anion gap metabolic acidosis due to lactic acidosis -resolved     #MICHELLE on CKD 3a, resolved  Admit Cr. 1.51, baseline Cr 1. Likely in setting of sepsis and hypovolemia  - continue hydration as above  - bladder scan prn  - Avoid NSAIDs  - CTM, BMP     Hypothyroidism  - PTA levothyroxine 175 mcg daily     HLD  - atorvastatin 40 mg     BPH  - PTA tamsulosin 0.4 mg daily     Chronic hypercapnic respiratory failure  - CPAP at night     MDD  CHRIS  Not on pta meds.  -hydroxyzine prn           Diet: Combination Diet Regular Diet    DVT Prophylaxis: Warfarin  Bazzi Catheter: Not present  Fluids: None  Lines: None     Cardiac Monitoring: ACTIVE order. Indication: syncope  Code Status: Full Code      Clinically Significant Risk Factors        # Hypokalemia: Lowest K = 3.3 mmol/L in last 2 days, will replace as needed           # Hypertension: Noted on problem list          # DMII: A1C = 6.6 % (Ref range: 0.0 - 5.6 %) within past 6 months, PRESENT ON ADMISSION             Disposition Plan      Expected Discharge Date: 09/26/2024        Discharge Comments: pending infection rule out and PT/OT recs        The patient's care was discussed with the Attending Physician, Dr. Rockwell .    Teresa Morales  Medical Student  Medicine Service, [address]  Securely message with [employer] (more info)  Text page via [facility] Paging/Directory   See signed in provider for up to date coverage information  ______________________________________________________________________    Interval History   Patient stated he was exhausted. He has a 10/10 headache of the bilateral frontal lobe (worsened from 2-3/10 yesterday) after performing movements with OT.   Patient denied active SI; he states he just wants to die naturally and is not looking for a plan to kill himself. Patient declined further discussion of  "his mental health. Patient endorsed 1 \"giant\" bowel movement yesterday alongside additional episodes of stools on his bed (exact number of BM unknown). Patient denied diarrhea    Physical Exam   Vital Signs: Temp: 97.9  F (36.6  C) Temp src: Oral BP: 126/63 Pulse: 89   Resp: 20 SpO2: 100 % O2 Device: None (Room air)    Weight: 0 lbs 0 oz    General: obese,no acute distress  Eyes: no icterus, no conjunctival injection, no papilledema  Cardiac: regular rate/rhythm, no murmurs/rubs/gallops. 1+ pitting edema of bilateral lower legs and feet.   Pulmonary: wheezing of the bilateral lungs (upper right and left lobes, middle right lobe)  Skin: dry scaly skin over legs bilaterally and ecchymoses over knee. Open small abrasion wounds over toes bilaterally.  Mental Status: awake and alert      Medical Decision Making       Please see A&P for additional details of medical decision making.      Data     I have personally reviewed the following data over the past 24 hrs:    6.9  \   8.9 (L)   / 245     138 105 12.7 /  116 (H)   3.3 (L) 25 0.99 \     INR:  2.20 (H) PTT:  N/A   D-dimer:  N/A Fibrinogen:  N/A       "

## 2024-09-25 NOTE — PHARMACY-ADMISSION MEDICATION HISTORY
Pharmacist Admission Medication History    Admission medication history is complete. The information provided in this note is only as accurate as the sources available at the time of the update.    Information Source(s): Patient, Prescription bottles, and CareEverywhere/SureScripts via in-person    Pertinent Information: Patient takes two 5 mg tablets of warfarin daily and has been doing this since at least July. He is followed in system.     Changes made to PTA medication list:  Added: Vitamin D3 1000 unit caps - patient states that he takes one tablet daily   Deleted: Vitamin C powder - patient stated not taking anymore   Changed: Torsemide 100 mg tabs - 1 tablet daily --> patient states he takes one tablet by mouth with midday meal.     Allergies reviewed with patient and updates made in EHR: yes    Medication History Completed By: Syed Arias RP 9/25/2024 3:01 PM    PTA Med List   Medication Sig Last Dose    ammonium lactate (LAC-HYDRIN) 12 % external cream Apply topically 2 times daily as needed for dry skin Past Week    aspirin (ASA) 81 MG tablet Take 1 tablet (81 mg) by mouth daily Past Week    atorvastatin (LIPITOR) 40 MG tablet Take 1 tablet (40 mg) by mouth daily Past Week    CERTAVITE/ANTIOXIDANTS tablet TAKE ONE TABLET BY MOUTH ONE TIME DAILY Past Week    levothyroxine (SYNTHROID/LEVOTHROID) 175 MCG tablet Take 1 tablet (175 mcg) by mouth every morning (before breakfast) Past Week    metFORMIN (GLUCOPHAGE) 500 MG tablet Take 1 tablet (500 mg) by mouth 2 times daily (with meals) Past Week    metoprolol succinate ER (TOPROL XL) 100 MG 24 hr tablet Take 1 tablet (100 mg) by mouth daily Past Week    nystatin (MYCOSTATIN) 979404 UNIT/GM external cream Apply topically 2 times daily as needed for dry skin More than a month    Omega-3 Fatty Acids (EQL FISH OIL) 1000 MG CAPS Take 1 capsule by mouth daily Past Week    potassium chloride chen ER (KLOR-CON M20) 20 MEQ CR tablet Take 2 tablets (40 mEq) by mouth  2 times daily Past Week    senna-docusate (SENEXON-S) 8.6-50 MG tablet Take 1 to 2 tablets by mouth 2 times daily as needed for constipation Past Week    spironolactone (ALDACTONE) 25 MG tablet Take 2 tablets (50 mg) by mouth daily Past Week    tamsulosin (FLOMAX) 0.4 MG capsule Take 1 capsule (0.4 mg) by mouth daily Past Week    torsemide (DEMADEX) 100 MG tablet Take 1 tablet (100 mg) by mouth daily (Patient taking differently: Take 100 mg by mouth daily (with lunch).) Past Week    vitamin C (ASCORBIC ACID) 1000 MG TABS Take 1,000 mg by mouth daily Past Week    Vitamin D, Cholecalciferol, 25 MCG (1000 UT) CAPS Take 1 capsule by mouth daily. Past Week    vitamin E (VITAMIN E COMPLEX) 1000 units (450 mg) capsule Take 1 capsule (1,000 Units) by mouth daily Past Week    warfarin ANTICOAGULANT (COUMADIN) 5 MG tablet Take 5 mg (5 mg x 1) every Thu; 10 mg (5 mg x 2) all other days or as directed by the INR clinic. (Patient taking differently: Patient takes 10 mg (5 mg x 2) everyday or as directed by the INR clinic.) 9/21/2024

## 2024-09-25 NOTE — PROGRESS NOTES
Admitted/transferred from:   2 RN full   skin assessment completed by Michele Calabrese, JUANPABLO and KELTON Arboleda RN.  Skin assessment finding: issues found RIGHT KNEE, RIGHT LOWER LEG, RIGHT TOES WOUNDS, LEFT KNEE, LEFT LOWER LEG AND LEFT TOES WOUND, SCATTERED SCABS TO LOWER LEFT LEG, HEMOSIDERIN STAINING AND CRACKED SKIN.     Interventions/actions: skin interventions WOC following      Will continue to monitor.    Pt AOx4, pleasant, VSS, on RA, denies chest pain, on tele, A.FIB. team aware. On regular diet, denies N/V. Bruises to under right under arm and back of left arm, refer to flow sheet. Voiding via purwick, no BM, passing flatus. NOOB. WOC following for LE wounds. Continue POC

## 2024-09-26 ENCOUNTER — APPOINTMENT (OUTPATIENT)
Dept: PHYSICAL THERAPY | Facility: CLINIC | Age: 74
DRG: 812 | End: 2024-09-26
Payer: COMMERCIAL

## 2024-09-26 ENCOUNTER — APPOINTMENT (OUTPATIENT)
Dept: OCCUPATIONAL THERAPY | Facility: CLINIC | Age: 74
DRG: 812 | End: 2024-09-26
Payer: COMMERCIAL

## 2024-09-26 LAB
ANION GAP SERPL CALCULATED.3IONS-SCNC: 13 MMOL/L (ref 7–15)
BUN SERPL-MCNC: 13 MG/DL (ref 8–23)
CALCIUM SERPL-MCNC: 8.1 MG/DL (ref 8.8–10.4)
CHLORIDE SERPL-SCNC: 102 MMOL/L (ref 98–107)
CREAT SERPL-MCNC: 0.98 MG/DL (ref 0.67–1.17)
EGFRCR SERPLBLD CKD-EPI 2021: 81 ML/MIN/1.73M2
ERYTHROCYTE [DISTWIDTH] IN BLOOD BY AUTOMATED COUNT: 15 % (ref 10–15)
EST. AVERAGE GLUCOSE BLD GHB EST-MCNC: 140 MG/DL
GLUCOSE BLDC GLUCOMTR-MCNC: 113 MG/DL (ref 70–99)
GLUCOSE BLDC GLUCOMTR-MCNC: 127 MG/DL (ref 70–99)
GLUCOSE BLDC GLUCOMTR-MCNC: 160 MG/DL (ref 70–99)
GLUCOSE SERPL-MCNC: 116 MG/DL (ref 70–99)
HBA1C MFR BLD: 6.5 %
HCO3 SERPL-SCNC: 24 MMOL/L (ref 22–29)
HCT VFR BLD AUTO: 32.6 % (ref 40–53)
HGB BLD-MCNC: 9.8 G/DL (ref 13.3–17.7)
INR PPP: 1.4 (ref 0.85–1.15)
MCH RBC QN AUTO: 23.2 PG (ref 26.5–33)
MCHC RBC AUTO-ENTMCNC: 30.1 G/DL (ref 31.5–36.5)
MCV RBC AUTO: 77 FL (ref 78–100)
PHOSPHATE SERPL-MCNC: 2.5 MG/DL (ref 2.5–4.5)
PHOSPHATE SERPL-MCNC: 2.8 MG/DL (ref 2.5–4.5)
PLATELET # BLD AUTO: 305 10E3/UL (ref 150–450)
POTASSIUM SERPL-SCNC: 3.3 MMOL/L (ref 3.4–5.3)
RBC # BLD AUTO: 4.22 10E6/UL (ref 4.4–5.9)
SODIUM SERPL-SCNC: 139 MMOL/L (ref 135–145)
WBC # BLD AUTO: 8.3 10E3/UL (ref 4–11)

## 2024-09-26 PROCEDURE — 97530 THERAPEUTIC ACTIVITIES: CPT | Mod: GO | Performed by: OCCUPATIONAL THERAPIST

## 2024-09-26 PROCEDURE — 85027 COMPLETE CBC AUTOMATED: CPT

## 2024-09-26 PROCEDURE — 83036 HEMOGLOBIN GLYCOSYLATED A1C: CPT

## 2024-09-26 PROCEDURE — 80048 BASIC METABOLIC PNL TOTAL CA: CPT

## 2024-09-26 PROCEDURE — 999N000157 HC STATISTIC RCP TIME EA 10 MIN

## 2024-09-26 PROCEDURE — 250N000013 HC RX MED GY IP 250 OP 250 PS 637

## 2024-09-26 PROCEDURE — 85610 PROTHROMBIN TIME: CPT

## 2024-09-26 PROCEDURE — 84100 ASSAY OF PHOSPHORUS: CPT

## 2024-09-26 PROCEDURE — 250N000013 HC RX MED GY IP 250 OP 250 PS 637: Performed by: STUDENT IN AN ORGANIZED HEALTH CARE EDUCATION/TRAINING PROGRAM

## 2024-09-26 PROCEDURE — 36415 COLL VENOUS BLD VENIPUNCTURE: CPT

## 2024-09-26 PROCEDURE — 97530 THERAPEUTIC ACTIVITIES: CPT | Mod: GP

## 2024-09-26 PROCEDURE — 97162 PT EVAL MOD COMPLEX 30 MIN: CPT | Mod: GP

## 2024-09-26 PROCEDURE — 99232 SBSQ HOSP IP/OBS MODERATE 35: CPT | Performed by: STUDENT IN AN ORGANIZED HEALTH CARE EDUCATION/TRAINING PROGRAM

## 2024-09-26 PROCEDURE — 120N000002 HC R&B MED SURG/OB UMMC

## 2024-09-26 PROCEDURE — 97535 SELF CARE MNGMENT TRAINING: CPT | Mod: GO | Performed by: OCCUPATIONAL THERAPIST

## 2024-09-26 RX ORDER — UREA 8.5 G/85G
CREAM TOPICAL 2 TIMES DAILY PRN
Status: CANCELLED | DISCHARGE
Start: 2024-09-26

## 2024-09-26 RX ORDER — MULTIPLE VITAMINS W/ MINERALS TAB 9MG-400MCG
1 TAB ORAL DAILY
Status: DISCONTINUED | OUTPATIENT
Start: 2024-09-26 | End: 2024-10-03 | Stop reason: HOSPADM

## 2024-09-26 RX ORDER — LANOLIN ALCOHOL/MO/W.PET/CERES
CREAM (GRAM) TOPICAL
Status: CANCELLED | DISCHARGE
Start: 2024-09-26

## 2024-09-26 RX ORDER — VITAMIN B COMPLEX
25 TABLET ORAL DAILY
Status: DISCONTINUED | OUTPATIENT
Start: 2024-09-26 | End: 2024-10-03 | Stop reason: HOSPADM

## 2024-09-26 RX ADMIN — SENNOSIDES AND DOCUSATE SODIUM 1 TABLET: 8.6; 5 TABLET ORAL at 09:27

## 2024-09-26 RX ADMIN — B-COMPLEX W/ C & FOLIC ACID TAB 1 TABLET: TAB at 09:15

## 2024-09-26 RX ADMIN — METOPROLOL SUCCINATE 100 MG: 50 TABLET, EXTENDED RELEASE ORAL at 09:15

## 2024-09-26 RX ADMIN — OXYCODONE HYDROCHLORIDE AND ACETAMINOPHEN 1000 MG: 500 TABLET ORAL at 09:15

## 2024-09-26 RX ADMIN — SPIRONOLACTONE 50 MG: 50 TABLET ORAL at 09:15

## 2024-09-26 RX ADMIN — SENNOSIDES AND DOCUSATE SODIUM 2 TABLET: 8.6; 5 TABLET ORAL at 17:51

## 2024-09-26 RX ADMIN — TORSEMIDE 100 MG: 100 TABLET ORAL at 09:15

## 2024-09-26 RX ADMIN — ATORVASTATIN CALCIUM 40 MG: 40 TABLET, FILM COATED ORAL at 09:15

## 2024-09-26 RX ADMIN — Medication 1 TABLET: at 13:20

## 2024-09-26 RX ADMIN — WARFARIN SODIUM 12.5 MG: 10 TABLET ORAL at 17:51

## 2024-09-26 RX ADMIN — Medication 25 MCG: at 09:15

## 2024-09-26 RX ADMIN — POTASSIUM CHLORIDE 40 MEQ: 750 TABLET, EXTENDED RELEASE ORAL at 17:52

## 2024-09-26 RX ADMIN — TAMSULOSIN HYDROCHLORIDE 0.4 MG: 0.4 CAPSULE ORAL at 09:15

## 2024-09-26 RX ADMIN — LEVOTHYROXINE SODIUM 175 MCG: 0.05 TABLET ORAL at 09:15

## 2024-09-26 RX ADMIN — POTASSIUM CHLORIDE 40 MEQ: 750 TABLET, EXTENDED RELEASE ORAL at 09:15

## 2024-09-26 ASSESSMENT — ACTIVITIES OF DAILY LIVING (ADL)
ADLS_ACUITY_SCORE: 41
ADLS_ACUITY_SCORE: 41
ADLS_ACUITY_SCORE: 37
ADLS_ACUITY_SCORE: 41
ADLS_ACUITY_SCORE: 37
DEPENDENT_IADLS:: MEAL PREPARATION;TRANSPORTATION
ADLS_ACUITY_SCORE: 41
ADLS_ACUITY_SCORE: 37
ADLS_ACUITY_SCORE: 41
ADLS_ACUITY_SCORE: 37
ADLS_ACUITY_SCORE: 37
ADLS_ACUITY_SCORE: 41

## 2024-09-26 NOTE — PLAN OF CARE
Goal Outcome Evaluation:      Plan of Care Reviewed With: patient    Overall Patient Progress: no changeOverall Patient Progress: no change    Outcome Evaluation: Replaced K & Ph. 1x R PIV SL. Tachycardic; pt spikes to 180s for short periods of time. Metoprolol availabe if sustained over 140. VERY heavy OP; check container frequently, backed up and soaked bed x2 earlier today. Has pain but refuses pain meds. Tele = A-fib.

## 2024-09-26 NOTE — CONSULTS
Care Management Initial Consult    General Information  Assessment completed with: Patient,    Type of CM/SW Visit: Initial Assessment    Primary Care Provider verified and updated as needed: Yes   Readmission within the last 30 days: no previous admission in last 30 days      Reason for Consult: discharge planning  Advance Care Planning: Advance Care Planning Reviewed: no concerns identified          Communication Assessment  Patient's communication style: spoken language (English or Bilingual)    Hearing Difficulty or Deaf: no   Wear Glasses or Blind: yes    Cognitive  Cognitive/Neuro/Behavioral: WDL     Arousal Level: opens eyes spontaneously                Living Environment:   People in home: alone     Current living Arrangements: apartment      Able to return to prior arrangements: other (see comments) (TBD)       Family/Social Support:  Care provided by: self  Provides care for: no one  Marital Status: Single  Support system: Friend          Description of Support System: Supportive    Support Assessment: Adequate social supports    Current Resources:   Patient receiving home care services: No        Community Resources: Transportation Services, Other (see comment) ([facility])  Equipment currently used at home: grab bar, toilet, grab bar, tub/shower, walker, standard, cane, straight, hospital bed, other (see comments) (bariatric chair)  Supplies currently used at home: None    Employment/Financial:  Employment Status: retired        Financial Concerns: none   Referral to Financial Worker: No       Does the patient's insurance plan have a 3 day qualifying hospital stay waiver?  No    Lifestyle & Psychosocial Needs:  Social Determinants of Health     Food Insecurity: Low Risk  (9/25/2024)    Food Insecurity     Within the past 12 months, did you worry that your food would run out before you got money to buy more?: No     Within the past 12 months, did the food you bought just not last and you  "didn t have money to get more?: No   Depression: Not at risk (7/11/2024)    PHQ-2     PHQ-2 Score: 0   Housing Stability: High Risk (9/25/2024)    Housing Stability     Do you have housing? : No     Are you worried about losing your housing?: No   Tobacco Use: Low Risk  (7/31/2024)    Patient History     Smoking Tobacco Use: Never     Smokeless Tobacco Use: Never     Passive Exposure: Not on file   Financial Resource Strain: Low Risk  (9/25/2024)    Financial Resource Strain     Within the past 12 months, have you or your family members you live with been unable to get utilities (heat, electricity) when it was really needed?: No   Alcohol Use: Not on file   Transportation Needs: Low Risk  (9/25/2024)    Transportation Needs     Within the past 12 months, has lack of transportation kept you from medical appointments, getting your medicines, non-medical meetings or appointments, work, or from getting things that you need?: No   Physical Activity: Not on file   Interpersonal Safety: Low Risk  (9/25/2024)    Interpersonal Safety     Do you feel physically and emotionally safe where you currently live?: Yes     Within the past 12 months, have you been hit, slapped, kicked or otherwise physically hurt by someone?: No     Within the past 12 months, have you been humiliated or emotionally abused in other ways by your partner or ex-partner?: No   Stress: Not on file   Social Connections: Not on file   Health Literacy: Not on file       Functional Status:  Prior to admission patient needed assistance:   Dependent ADLs:: Independent, Ambulation-cane, Ambulation-walker  Dependent IADLs:: Meal Preparation, Transportation       Mental Health Status:  Mental Health Status: No Current Concerns (stated he had \"dark thoughts\" But declined having active mental health concerns)  Mental Health Management: Psychiatrist    Chemical Dependency Status:  Chemical Dependency Status: No Current Concerns             Values/Beliefs:  Spiritual, " "Cultural Beliefs, Faith Practices, Values that affect care: no               Discussed  Partnership in Safe Discharge Planning  document with patient/family: Yes    Additional Information:  \"Clarke Moreira is a 74 year old male with T2DM w/ neuropathy and chronic ulcers, pAF, HFpEF, HLD, MDD, on CPAP and uses walker at home presents after 2 syncopal episodes and admitted for possible sepsis. There is no longer concern for sepsis; current concern for refeeding syndrome and Afib sustained RVR. \"    SW met with the patient at bedside to complete initial assessment. SW introduced self and explained their role in the patient's care. SW verified the patient's address, PCP, and insurance.    When SW initially entered the patient's room and introduced self patient stated \"I already know why you're here its because the evening people took what I said out of context last night so then they went to SW.\" SW stated they are unclear what the patient is talking about and requested patient explain the situation in their own words. Patient reported that he lives in a high rise building roughly [address] years old and due to the heat and humidity he found himself feeling weak recently. Patient stated that he ended up getting dizzy and falling x2 and unable to get up the second time. Patient reported that EMS had to move a lot of his larger furniture out of the way in order to get him  on a \"tarp\" to get him on the stretcher. Patient stated one of his biggest stressors and concerns right now is his furniture getting back in place. Patient stated he is concerned that he will discharge home and he will be unable to be comfortable and safe. SW acknowledged the patient's feelings. SW stated they can look into potential resources for the patient.  Patient reported he lives at home alone and is independent with all ADLs and most IADLs. Patient reported he uses a standard walker, hospital bed, bariatric chair, bathroom grab bars, and " straight cane. He utilizes [employer] for transportation. He receives meals through [employer] [place].   SW reported that PT/OT is recommending patient discharge to TCU once med ready. Patient reported he has been to  TCU 2x before and had a fairly positive experience both times. Patient requested a referral be sent to  TCU and then they can further discuss.   Patient declined having any questions or concerns at this time.     Next Steps: SW will send referral to  TCU liaison tomorrow. SW will continue to follow for placement and discharge needs.     Israel Myers, [facility]  [address]   Office: [phone]  [email or web address]  Securely message with [employer] (Search Name or 7C Med Surg [id number] MERVAT)  Text page via [facility] Paging/Directory   See signed in provider for up to date coverage information  Social Work & Care Management Department

## 2024-09-26 NOTE — PLAN OF CARE
Goal Outcome Evaluation:      Plan of Care Reviewed With: patient    Overall Patient Progress: no changeOverall Patient Progress: no change    Outcome Evaluation: Able to work with both PT and OT today and tolerated will.   Tele in A-fib greater than 100 most of the time.   And heart rate elevation with activity.  Large urine output today.  Incontnent twice large amounts twice today. Pleasant mood, happy to have conversation.  Patient worries about his health and strength and getting back home.  Makes axels known.

## 2024-09-26 NOTE — PROGRESS NOTES
Physician Attestation   I, Elif Rockwell, was present with the medical/JANELLE student who participated in the service and in the documentation of the note.  I have verified the history and personally performed the physical exam and medical decision making.  I agree with the assessment and plan of care as documented in the note.      Deleon findings: Clarke is medically cleared for discharge and social work is helping to coordinate discharge to TCU. Today counseled on his diabetic neuropathy causing his ulcers as well as ways to prevent infections. Will continue to monitor electrolytes and replete as needed.    35 MINUTES SPENT BY ME on the date of service doing chart review, history, exam, documentation & further activities per the note.      Elif Rockwell  Date of Service (when I saw the patient): 09/26/24    [facility]    Progress Note - Medicine Service, ANGEL LUIS TEAM 3       Date of Admission:  9/22/2024    Assessment & Plan   Clarke Moreira is a 74 year old male with T2DM w/ neuropathy and chronic ulcers, pAF, HFpEF, HLD, MDD, on CPAP and uses walker at home presents after 2 syncopal episodes and admitted for possible sepsis. There is no longer concern for sepsis; current concern for refeeding syndrome and Afib sustained RVR.     Today:   Awaiting social work recs for TCU     Hypokalemia, Hypophosphatemia  Concern for refeeding syndrome -resolved  Current K+ is 3.3 9/25-9/26, dropped from 3.6 9/22-9/24. Phosphorus currently 2.5 (NL) increased from 2.3 (9/25).  magnesium WNL (2.0). Patient ate for the first time on 9/25 after 2 days of no food. concern refeeding syndrome.     -Initiated phosphorus standard replacement protocol- RN Managed  -CTM     HFpEF  - restarted PTA torsemide 100 mg daily   - restarted PTA Kcl 40 meq BID   - PTA spironolactone 25 mg daily     New microcytic Anemia- stable  Patient reports last outpatient colonoscopy was <5 years ago and  normal. He does not recall where the colonoscopy was performed.  Admit Hgb 11.6, currently 9.8. Iron was 14 and ferritin 28.  - daily hgb check  - c/w ferrous sulfate 325 mg supplementation  - [] consider outpatient colonoscopy and further workup     Pre Syncopal episodes x2 9/22  No LOC with falls. These episodes are most likely vasovagal given the context of hypotension and a hot/humid apartment as well as prodrome of light headedness and dizziness.  Cardiac causes of syncope are less likely but still possible given the patient's first episode occurred when raising his arm (coronary steal) and EKG findings of Afib w RVR and nonspecific ST/T wave abnormalities. Neuro causes of syncope less likely given negative head imaging and no evidence of tongue biting though seizures cannot be completely ruled out. Pt pan-scanned orthopaedically to rule out injuries 2/2 syncope without acute findings. OT discharge rec is transitional care facility  - Discuss with PT  - on cardiac monitoring     Type 2 diabetes w neuropathy  Chronic ulcerations  Onychomycosis  - WOC consulted  - HOLD metformin 500 mg daily     Paroxysmal atrial fibrillation  Elevated INR  Admit INR 3.12. EKG 9/23 continues to show Afib.   - PTA metoprolol succinate 100 mg daily  - warfarin, pharmacy to dose goal 2-3  - Initiated Metoprolol 5mg IV PRN for Afib sustained RVR, HR>140     BUE Tremors, paroxysmal  Patient had three episodes of afebrile tremors. 11pm 9/22 nurse noted afebrile pale tremors. Around noon 9/23 patient had afebrile tremors w tachycardia; tachycardia rapidly improved (160->104bpm) with deep breathing exercises. Patient stated he was feeling overheated and anxious. 9/24 early morning patient had tremors; improved with application of 3 warm blankets. EKG demonstrated no acute changes ( Afib w RVR, chronic issue). These tremors are most likely related to patient's anxiety given (1) patient reported anxiety and (2) had a rapid response to deep  breathing. Rigors related to infection are less likely (given patient's rapid response to deep breathing exercises and improvement in infectious Sx) but still possible.  -Hydroxyzine 50mg q6h ordered     Diffuse Osteopenia   Osteopenia noted on foot and ankle x-ray on admission. Acute or subacute intra-articular fracture of the medial base of the first proximal phalanx.   -Checked Vitamin D levels- normal  - Calcium low-normal (8.5)  [] Follow up outpatient with PCP or Endocrine     Resolved issues and chronic issues:      Passive suicidal ideation  Hx active suicidal ideation, MDD  Frequent psychology visits. On risk assessment at admission, patient denies suicidal intent or plan. Patient says that as he ages, his desire for self-harm decreases as his end would be a natural process due to old age. 9/24 patient endorsed active SI stating he is looking for ways to kill himself. 9/25 patient no longer has active SI and declined further mental health conversations     Concern for Sepsis, resolved   Patient presented after syncopal episodes with rigors and leukocytosis of 17.3 with leftward ANC shift and Lactate 3.4. UA bland without dysuria. CTAP with cholelithiasis, otherwise unremarkable. No respiratory sxs. MRSA nares negative. Suspect source from skin abrasions vs dental vs bacterimia, however Dental CT demonstrated no signs of abscess. S/p IVF and on empiric abx. ID stopped empiric abx 9/24  - Blood Cx 9/22: NGTD 24 hours.  - Vanc/zosyn started empirically; 9/22-9/24  - ID consulted  - Wound care consulted for LE wounds              - lymphedema consult   - wound cares  - Podiatry consulted: s/p wound cleansing at bedside         #Anion gap metabolic acidosis due to lactic acidosis -resolved     #MICHELLE on CKD 3a, resolved  Admit Cr. 1.51, baseline Cr 1. Likely in setting of sepsis and hypovolemia  - continue hydration as above  - bladder scan prn  - Avoid NSAIDs  - CTM, BMP     Hypothyroidism  - PTA levothyroxine  "175 mcg daily     HLD  - atorvastatin 40 mg     BPH  - PTA tamsulosin 0.4 mg daily     Chronic hypercapnic respiratory failure  - CPAP at night     MDD  CHRIS  Not on pta meds.  -hydroxyzine prn           Diet: Combination Diet Regular Diet    DVT Prophylaxis: Warfarin  Bazzi Catheter: Not present  Fluids: None  Lines: None     Cardiac Monitoring: ACTIVE order. Indication: syncope  Code Status: Full Code       Clinically Significant Risk Factors        # Hypokalemia: Lowest K = 3.3 mmol/L in last 2 days, will replace as needed           # Hypertension: Noted on problem list          # DMII: A1C = 6.6 % (Ref range: 0.0 - 5.6 %) within past 6 months, PRESENT ON ADMISSION             Disposition Plan      Expected Discharge Date: 09/26/2024        Discharge Comments: PT rec'ed TCU        The patient's care was discussed with the Attending Physician, Dr. Rockwell .    Teresa Morales  Medical Student  Medicine Service, [address]  Securely message with Vocera (more info)  Text page via [employer] Paging/Directory   See signed in provider for up to date coverage information  ______________________________________________________________________    Interval History   No pertinent overnight events.   Patient states he didn't sleep well due to racing thoughts. Patient insisted that he is more concerned about difficulty walking d/t leg issues than he is about going home. He felt \"betrayed\" last night when a healthcare worker relayed otherwise.     His bilateral frontal lobe headache reduced to 4/10 in severity (10 being the most severe); it was 10/10 yesterday. Patient continues to deny pain medication for the headache and attributes the pain in part due to his mental health issues.     Patient continues to endorse passive thoughts of SI but states he has no active thoughts or plans of killing himself. He just feels \"he has a life that is not worth living\"    Patient states he " "feels bloated with a little gas (last BM in ED approx 2 days ago); however, he does not feel constipated just yet.     Patient states he has SOB as a result of increased exertion in the hospital, coughing, and a feeling \"something is stuck in his throat. \"    ROS:   Consitutional: denies fever/chills, nausea, vomiting, night sweats  Neurologic: chronic numbness and tingling of fingers, toes, and lower legs  Cardiac: No chest pain, no palpitations  Pulm: SOB on exertion  GI: No diarrhea, no dysuria, no hematuria, last BM in ED 2d ago      Physical Exam   Vital Signs: Temp: 98.1  F (36.7  C) Temp src: Oral BP: 120/63 (MAP-78) Pulse: 96   Resp: 18 SpO2: 98 % O2 Device: None (Room air)    Weight: 0 lbs 0 oz    General: obese,no acute distress  Eyes: no icterus, no conjunctival injection, no papilledema  Cardiac: regular rate/rhythm, no murmurs/rubs/gallops. 1+ pitting edema of bilateral lower legs and feet.   Pulmonary: clear to auscultation, no wheezes/rales  Skin: dry scaly skin over legs bilaterally and ecchymoses over knee. Open small abrasion wounds over toes bilaterally.  Mental Status: awake and alert    Medical Decision Making           Data     I have personally reviewed the following data over the past 24 hrs:    8.3  \   9.8 (L)   / 305     139 102 13.0 /  116 (H)   3.3 (L) 24 0.98 \     INR:  1.40 (H) PTT:  N/A   D-dimer:  N/A Fibrinogen:  N/A       "

## 2024-09-26 NOTE — PROGRESS NOTES
"   09/26/24 1000   Appointment Info   Signing Clinician's Name / Credentials (PT) Debbie Barba, PT, DPT   Rehab Comments (PT) Monitor HR, Tech Dannielle   Living Environment   People in Home alone   Current Living Arrangements apartment   Home Accessibility wheelchair accessible   Transportation Anticipated public transportation   Living Environment Comments Pt lives in an apartment on the 16th floor (elevator accessible), no stairs within apaterment. Has FWW that he uses for community mobility otherwise uses a SPC in the home. Also reports using counters/tables/chairs for assistance while mobilizing in home. Pt also reports he has a hospital bed in the home.   Self-Care   Usual Activity Tolerance fair   Current Activity Tolerance poor   Regular Exercise No   Equipment Currently Used at Home grab bar, toilet;grab bar, tub/shower;walker, standard;cane, straight   Fall history within last six months yes   Number of times patient has fallen within last six months 2   Activity/Exercise/Self-Care Comment Pt reports IND with all ADLs and baseline and that he lives alone without assistance. He primarily uses in SPC in the home.   General Information   Onset of Illness/Injury or Date of Surgery 09/23/24   Referring Physician Lc Curtis MD   Patient/Family Therapy Goals Statement (PT) Return to prior level of function, return home   Pertinent History of Current Problem (include personal factors and/or comorbidities that impact the POC) Per EMR: \"Clarke Moreira is a 74 year old male with T2DM w/ neuropathy and chronic ulcers, pAF, HFpEF, HLD, MDD, on CPAP and uses walker at home presents after 2 syncopal episodes and admitted for possible sepsis. There is no longer concern for sepsis; current concern for refeeding syndrome and Afib sustained RVR.\"   Existing Precautions/Restrictions no known precautions/restrictions   Weight-Bearing Status - LUE full weight-bearing   Weight-Bearing Status - RUE full " weight-bearing   Weight-Bearing Status - LLE full weight-bearing   Weight-Bearing Status - RLE full weight-bearing   General Observations Activity: up ad charity   Cognition   Affect/Mental Status (Cognition) WNL   Pain Assessment   Patient Currently in Pain Yes, see Vital Sign flowsheet  (Bilateral knee pain)   Integumentary/Edema   Integumentary/Edema Comments Bilateral LE edema, pt reports wearing compression stockings at home   Posture    Posture Forward head position   Range of Motion (ROM)   ROM Comment Decreased DF/PF due to pain and swelling, lacking ~10 degrees knee ext bilaterally, reporting pain with knee movements, BUE WFL   Strength (Manual Muscle Testing)   Strength Comments Bilateral DF, PF, knee ext and knee flex >3/5 per strength screen, BUE >3/5   Bed Mobility   Comment, (Bed Mobility) Ax2 bed mobility, relies heavily on bed rail   Transfers   Comment, (Transfers) Not assessed today due to HR   Gait/Stairs (Locomotion)   Comment, (Gait/Stairs) Not assessed today 2/2 HR   Balance   Balance Comments Good sitting balance EOB, SBA while seated EOB   Sensory Examination   Sensory Perception Comments Pt reported decreased/absent sensation in bilateral feet due to neuropathy. Reports some pins and needles in his ankles during movement. Per pt, he always wears shoes when amb in the home due to lack of sensation.   Clinical Impression   Criteria for Skilled Therapeutic Intervention Yes, treatment indicated   PT Diagnosis (PT) Impaired functional mobility   Influenced by the following impairments Decreased strength, decreased activity tolerance   Functional limitations due to impairments ambulation, bed mobility, transfers   Clinical Presentation (PT Evaluation Complexity) evolving   Clinical Presentation Rationale Clinical judgement   Clinical Decision Making (Complexity) moderate complexity   Planned Therapy Interventions (PT) bed mobility training;gait training;home exercise program;patient/family  education;strengthening;transfer training;ROM (range of motion)   Risk & Benefits of therapy have been explained evaluation/treatment results reviewed;care plan/treatment goals reviewed;risks/benefits reviewed;current/potential barriers reviewed;participants included;participants voiced agreement with care plan;patient   Clinical Impression Comments Angel is a 74 year old male who presents with impaired mobility due to recent fall and concern for Afib. He would benefit from skilled IP PT to progress strength and mobility prior to discharge.   PT Total Evaluation Time   PT Eval, Moderate Complexity Minutes (89815) 8   Physical Therapy Goals   PT Frequency 5x/week   PT Predicted Duration/Target Date for Goal Attainment 10/04/24   PT Goals Bed Mobility;Transfers;Gait   PT: Bed Mobility Independent;Rolling;Bridging   PT: Transfers Independent;Sit to/from stand;Bed to/from chair;Assistive device   PT: Gait Modified independent;Standard walker;100 feet   PT Discharge Planning   PT Plan check HR status, bed mobility, lift to chair and sit<>stands from chair   PT Discharge Recommendation (DC Rec) Transitional Care Facility   PT Rationale for DC Rec Pt is currently functioning below baseline and requires assist of 2 for all mobility, while prior to admission was ind with all activity and lives alone. He would benefit from TCU to progress safety and ind with mobility.   PT Brief overview of current status Ax2 bed mobility   Total Session Time   Timed Code Treatment Minutes 40   Total Session Time (sum of timed and untimed services) 48

## 2024-09-26 NOTE — PLAN OF CARE
Goal Outcome Evaluation:      Plan of Care Reviewed With: patient    Overall Patient Progress: no changeOverall Patient Progress: no change    Outcome Evaluation: Pt is alert and oriented x4, denies nausea, no BM this shift. VSS RA with Afib, HR was less than 120 this shift. Metoprolol availabe if sustained over 140 and pt is on tele. Pt endorsed pain but declines meds. Pt uses the purewick which is connected to suction.Pt was worried about his home situation and social work consult was placed. Pt calls appropriately, continue with POC.

## 2024-09-27 ENCOUNTER — APPOINTMENT (OUTPATIENT)
Dept: OCCUPATIONAL THERAPY | Facility: CLINIC | Age: 74
DRG: 812 | End: 2024-09-27
Payer: COMMERCIAL

## 2024-09-27 LAB
ANION GAP SERPL CALCULATED.3IONS-SCNC: 10 MMOL/L (ref 7–15)
BACTERIA BLD CULT: NO GROWTH
BACTERIA BLD CULT: NO GROWTH
BUN SERPL-MCNC: 13.2 MG/DL (ref 8–23)
CALCIUM SERPL-MCNC: 8.5 MG/DL (ref 8.8–10.4)
CHLORIDE SERPL-SCNC: 100 MMOL/L (ref 98–107)
CREAT SERPL-MCNC: 0.95 MG/DL (ref 0.67–1.17)
EGFRCR SERPLBLD CKD-EPI 2021: 84 ML/MIN/1.73M2
ERYTHROCYTE [DISTWIDTH] IN BLOOD BY AUTOMATED COUNT: 15.1 % (ref 10–15)
GLUCOSE BLDC GLUCOMTR-MCNC: 108 MG/DL (ref 70–99)
GLUCOSE BLDC GLUCOMTR-MCNC: 147 MG/DL (ref 70–99)
GLUCOSE BLDC GLUCOMTR-MCNC: 167 MG/DL (ref 70–99)
GLUCOSE SERPL-MCNC: 111 MG/DL (ref 70–99)
HCO3 SERPL-SCNC: 25 MMOL/L (ref 22–29)
HCT VFR BLD AUTO: 34.6 % (ref 40–53)
HGB BLD-MCNC: 10.1 G/DL (ref 13.3–17.7)
INR PPP: 1.4 (ref 0.85–1.15)
MAGNESIUM SERPL-MCNC: 1.8 MG/DL (ref 1.7–2.3)
MCH RBC QN AUTO: 23 PG (ref 26.5–33)
MCHC RBC AUTO-ENTMCNC: 29.2 G/DL (ref 31.5–36.5)
MCV RBC AUTO: 79 FL (ref 78–100)
PHOSPHATE SERPL-MCNC: 2.5 MG/DL (ref 2.5–4.5)
PHOSPHATE SERPL-MCNC: 2.8 MG/DL (ref 2.5–4.5)
PLATELET # BLD AUTO: 332 10E3/UL (ref 150–450)
POTASSIUM SERPL-SCNC: 3.7 MMOL/L (ref 3.4–5.3)
RBC # BLD AUTO: 4.39 10E6/UL (ref 4.4–5.9)
SODIUM SERPL-SCNC: 135 MMOL/L (ref 135–145)
WBC # BLD AUTO: 8.9 10E3/UL (ref 4–11)

## 2024-09-27 PROCEDURE — 250N000013 HC RX MED GY IP 250 OP 250 PS 637

## 2024-09-27 PROCEDURE — 85027 COMPLETE CBC AUTOMATED: CPT

## 2024-09-27 PROCEDURE — 36415 COLL VENOUS BLD VENIPUNCTURE: CPT

## 2024-09-27 PROCEDURE — 97530 THERAPEUTIC ACTIVITIES: CPT | Mod: GO

## 2024-09-27 PROCEDURE — 999N000157 HC STATISTIC RCP TIME EA 10 MIN

## 2024-09-27 PROCEDURE — 99232 SBSQ HOSP IP/OBS MODERATE 35: CPT | Performed by: INTERNAL MEDICINE

## 2024-09-27 PROCEDURE — 85610 PROTHROMBIN TIME: CPT

## 2024-09-27 PROCEDURE — 97535 SELF CARE MNGMENT TRAINING: CPT | Mod: GO

## 2024-09-27 PROCEDURE — 83735 ASSAY OF MAGNESIUM: CPT | Performed by: INTERNAL MEDICINE

## 2024-09-27 PROCEDURE — 250N000009 HC RX 250

## 2024-09-27 PROCEDURE — 84100 ASSAY OF PHOSPHORUS: CPT

## 2024-09-27 PROCEDURE — 250N000013 HC RX MED GY IP 250 OP 250 PS 637: Performed by: STUDENT IN AN ORGANIZED HEALTH CARE EDUCATION/TRAINING PROGRAM

## 2024-09-27 PROCEDURE — 80048 BASIC METABOLIC PNL TOTAL CA: CPT

## 2024-09-27 PROCEDURE — 120N000002 HC R&B MED SURG/OB UMMC

## 2024-09-27 RX ADMIN — POTASSIUM CHLORIDE 40 MEQ: 750 TABLET, EXTENDED RELEASE ORAL at 20:38

## 2024-09-27 RX ADMIN — METOPROLOL TARTRATE 5 MG: 5 INJECTION INTRAVENOUS at 11:25

## 2024-09-27 RX ADMIN — TAMSULOSIN HYDROCHLORIDE 0.4 MG: 0.4 CAPSULE ORAL at 08:42

## 2024-09-27 RX ADMIN — Medication 25 MCG: at 08:43

## 2024-09-27 RX ADMIN — POTASSIUM CHLORIDE 40 MEQ: 750 TABLET, EXTENDED RELEASE ORAL at 08:42

## 2024-09-27 RX ADMIN — SPIRONOLACTONE 50 MG: 50 TABLET ORAL at 08:44

## 2024-09-27 RX ADMIN — OXYCODONE HYDROCHLORIDE AND ACETAMINOPHEN 1000 MG: 500 TABLET ORAL at 08:43

## 2024-09-27 RX ADMIN — TORSEMIDE 100 MG: 100 TABLET ORAL at 11:33

## 2024-09-27 RX ADMIN — B-COMPLEX W/ C & FOLIC ACID TAB 1 TABLET: TAB at 08:43

## 2024-09-27 RX ADMIN — METOPROLOL SUCCINATE 100 MG: 50 TABLET, EXTENDED RELEASE ORAL at 08:43

## 2024-09-27 RX ADMIN — ATORVASTATIN CALCIUM 40 MG: 40 TABLET, FILM COATED ORAL at 08:41

## 2024-09-27 RX ADMIN — LEVOTHYROXINE SODIUM 175 MCG: 0.05 TABLET ORAL at 08:44

## 2024-09-27 RX ADMIN — WARFARIN SODIUM 12.5 MG: 10 TABLET ORAL at 17:32

## 2024-09-27 RX ADMIN — SENNOSIDES AND DOCUSATE SODIUM 2 TABLET: 8.6; 5 TABLET ORAL at 20:38

## 2024-09-27 RX ADMIN — FERROUS SULFATE TAB 325 MG (65 MG ELEMENTAL FE) 325 MG: 325 (65 FE) TAB at 11:33

## 2024-09-27 RX ADMIN — Medication 1 TABLET: at 11:33

## 2024-09-27 ASSESSMENT — ACTIVITIES OF DAILY LIVING (ADL)
ADLS_ACUITY_SCORE: 37
ADLS_ACUITY_SCORE: 38
ADLS_ACUITY_SCORE: 37
ADLS_ACUITY_SCORE: 38
ADLS_ACUITY_SCORE: 38
ADLS_ACUITY_SCORE: 37
ADLS_ACUITY_SCORE: 38
ADLS_ACUITY_SCORE: 37
ADLS_ACUITY_SCORE: 38
ADLS_ACUITY_SCORE: 37
ADLS_ACUITY_SCORE: 38
ADLS_ACUITY_SCORE: 37
ADLS_ACUITY_SCORE: 37
ADLS_ACUITY_SCORE: 38
ADLS_ACUITY_SCORE: 38
ADLS_ACUITY_SCORE: 37
ADLS_ACUITY_SCORE: 37
ADLS_ACUITY_SCORE: 38
ADLS_ACUITY_SCORE: 37
ADLS_ACUITY_SCORE: 38
ADLS_ACUITY_SCORE: 37

## 2024-09-27 NOTE — PLAN OF CARE
Goal Outcome Evaluation: Progressing       Plan of Care Reviewed With: patient    Overall Patient Progress: improvingOverall Patient Progress: improving    Outcome Evaluation: Pt alert and oriented x4. R/A. VSS. Skin intact. Pt is continent. Asks for assistance with the urinal. Voiding well. R PIV- SL. Denies pain. Pt had one episode with Afib with RVR.-180's with therapy. Metoprolol 5 mg given prn. MD order to dc tele monitor. Pt no longer on Tele. No further concerns at this time. will continue to monitor.    Temp: (P) 98.4  F (36.9  C) Temp src: (P) Oral BP: 135/73 Pulse: 88   Resp: 16 SpO2: 98 % O2 Device: None (Room air)

## 2024-09-27 NOTE — PROGRESS NOTES
Care Management Follow Up    Length of Stay (days): 5    Expected Discharge Date: 10/02/2024     Concerns to be Addressed: discharge planning, home safety     Patient plan of care discussed at interdisciplinary rounds: Yes    Anticipated Discharge Disposition: Transitional Care  Anticipated Discharge Services: Transportation Services  Anticipated Discharge DME: None    Patient/family educated on Medicare website which has current facility and service quality ratings: yes  Education Provided on the Discharge Plan: Yes  Patient/Family in Agreement with the Plan: yes    Referrals Placed by CM/SW:      [facility]  [facility] [address] [address]  P: [phone]    Private pay costs discussed: Not applicable    Discussed  Partnership in Safe Discharge Planning  document with patient/family: No     Handoff Completed: Will be completed at the time of discharge    Additional Information:  Provider reported to  that the patient is medically ready    SW sent referral to  TCU liaison [facility] [place]. She stated she will review the patient    Next Steps:  will place the patient on the  weekend follow up list.  weekend continue to follow for discharge needs and placement.    JASMYNE Esquivel  [address]   Office: [phone]  [email or web address]  Securely message with [employer] (Search Name or 7C Med Surg [phone] )  Text page via [facility] Paging/Directory   See signed in provider for up to date coverage information  Social Work & Care Management Department

## 2024-09-27 NOTE — PROGRESS NOTES
Physician Attestation   I, Clarice Rangel MD, was present with the medical/JANELLE student who participated in the service and in the documentation of the note.  I have verified the history and personally performed the physical exam and medical decision making.  I agree with the assessment and plan of care as documented in the note.      Please see A&P for additional details of medical decision making.  See my edits in blue.     He is awaiting placement at TCU.    Clarice Rangel MD  Date of Service (when I saw the patient): 09/27/24    [facility]    Progress Note - Medicine Service, [facility] TEAM 3       Date of Admission:  9/22/2024    Assessment & Plan   Clarke Moreira is a 74 year old male with T2DM w/ neuropathy and chronic ulcers, pAF, HFpEF, HLD, MDD, on CPAP and uses walker at home presents after 2 syncopal episodes and admitted for possible sepsis. There is no longer concern for sepsis, refeeding syndrome. Afib is generally rate controlled with exception of transient heart rate rises with activity/exertion.     Today:   Awaiting social work recs for TCU  Removed Telemetry      Hypokalemia, Hypophosphatemia  Concern for refeeding syndrome -resolved  Current K+ is 3.7 was 3.6 9/22-9/24. Phosphorus currently 2.8 (NL) increased from 2.3 (9/25).  magnesium WNL (2.0). Patient ate for the first time on 9/25 after 2 days of no food. concern refeeding syndrome.      -Initiated phosphorus standard replacement protocol- RN Managed  -CTM     HFpEF  - restarted PTA torsemide 100 mg daily   - restarted PTA Kcl 40 meq BID   - PTA spironolactone 25 mg daily     New microcytic Anemia- stable  Patient reports last outpatient colonoscopy was <5 years ago and normal. He does not recall where the colonoscopy was performed.  Admit Hgb 11.6, currently 9.8. Iron was 14 and ferritin 28.  - daily hgb check  - c/w ferrous sulfate 325 mg supplementation  - [] consider outpatient  colonoscopy and further workup     Pre Syncopal episodes x2 on 9/22  No LOC with falls. These episodes are most likely vasovagal given the context of hypotension and a hot/humid apartment as well as prodrome of light headedness and dizziness.  Cardiac causes of syncope are less likely but still possible given the patient's first episode occurred when raising his arm (coronary steal) and EKG findings of Afib w RVR and nonspecific ST/T wave abnormalities. Neuro causes of syncope less likely given negative head imaging and no evidence of tongue biting though seizures cannot be completely ruled out. Pt pan-scanned orthopaedically to rule out injuries 2/2 syncope without acute findings. OT discharge rec is transitional care facility  - Continue to work with PT on rehab, monitor for further episodes  -Discontinue telemetry as heart rates control has improved    Type 2 diabetes w neuropathy  Chronic ulcerations  Onychomycosis  - WOC consulted  - HOLD metformin 500 mg daily, okay for sliding scale insulin while hospitalized but could resume metformin as he nears discharge     Paroxysmal atrial fibrillation  Elevated INR  Admit INR 3.12. EKG 9/23 continues to show Afib.   - PTA metoprolol succinate 100 mg daily  - warfarin, pharmacy to dose goal 2-3  - Initiated Metoprolol 5mg IV PRN for Afib sustained RVR, HR>140     BUE Tremors, paroxysmal  Patient had three episodes of afebrile tremors. 11pm 9/22 nurse noted afebrile pale tremors. Around noon 9/23 patient had afebrile tremors w tachycardia; tachycardia rapidly improved (160->104bpm) with deep breathing exercises. Patient stated he was feeling overheated and anxious. 9/24 early morning patient had tremors; improved with application of 3 warm blankets. EKG demonstrated no acute changes ( Afib w RVR, chronic issue). These tremors are most likely related to patient's anxiety given (1) patient reported anxiety and (2) had a rapid response to deep breathing. Rigors related to  infection are less likely (given patient's rapid response to deep breathing exercises and improvement in infectious Sx) but still possible.  -Hydroxyzine 50mg q6h ordered     Diffuse Osteopenia   Osteopenia noted on foot and ankle x-ray on admission. Acute or subacute intra-articular fracture of the medial base of the first proximal phalanx.   -Checked Vitamin D levels- normal  - Calcium low-normal (8.5)  [] Follow up outpatient with PCP or Endocrine     Resolved issues and chronic issues:      Passive suicidal ideation  Hx active suicidal ideation, MDD  Frequent psychology visits. On risk assessment at admission, patient denies suicidal intent or plan. Patient says that as he ages, his desire for self-harm decreases as his end would be a natural process due to old age. 9/24 patient endorsed active SI stating he is looking for ways to kill himself. 9/25 patient no longer has active SI and declined further mental health conversations     Concern for Sepsis, resolved   Patient presented after syncopal episodes with rigors and leukocytosis of 17.3 with leftward ANC shift and Lactate 3.4. UA bland without dysuria. CTAP with cholelithiasis, otherwise unremarkable. No respiratory sxs. MRSA nares negative. Suspect source from skin abrasions vs dental vs bacterimia, however Dental CT demonstrated no signs of abscess. S/p IVF and on empiric abx. ID stopped empiric abx 9/24  - Blood Cx 9/22: NGTD 24 hours.  - Vanc/zosyn started empirically; 9/22-9/24  - ID consulted  - Wound care consulted for LE wounds              - lymphedema consult   - wound cares  - Podiatry consulted: s/p wound cleansing at bedside         #Anion gap metabolic acidosis due to lactic acidosis -resolved     #MICHELLE on CKD 3a, resolved  Admit Cr. 1.51, baseline Cr 1. Likely in setting of sepsis and hypovolemia  - continue hydration as above  - bladder scan prn  - Avoid NSAIDs  - CTM, BMP     Hypothyroidism  - PTA levothyroxine 175 mcg daily     HLD  -  atorvastatin 40 mg     BPH  - PTA tamsulosin 0.4 mg daily     Chronic hypercapnic respiratory failure  - CPAP at night     MDD  CHRIS  Not on pta meds.  -hydroxyzine prn           Diet: Combination Diet Regular Diet    DVT Prophylaxis: Warfarin  Bazzi Catheter: Not present  Fluids: None  Lines: None     Cardiac Monitoring: no active order  Code Status: Full Code          The patient's care was discussed with the Attending Physician, Dr. Rangel .    Teresa Morales  Medical Student  Medicine Service, [facility] 3  [facility]  Securely message with Vocera (more info)  Text page via [facility] Paging/Directory   See signed in provider for up to date coverage information  ______________________________________________________________________    Interval History   No pertinent overnight events    Overall, patient expressed gratitude for the way he is being treated and especially thanked his overnight nurse/assistant. He states he physically feels much better after OT/PT. He continues to have anxiety  He states his headache is much better and just slightly noticeable    ROS:   Constitutional: no nausea, no vomiting  Head: no confusion, no lightheadedness  Cardiac: chest pain  Pulm: yes cough (phlegm), no SOB, no pain on inspiration, no hemoptysis  GI: no diarrhea, no constipation, no abdominal pain  : No dysuria    Physical Exam   Vital Signs: Temp: 98.1  F (36.7  C) Temp src: Oral BP: 120/63 (MAP-78) Pulse: 96   Resp: 18 SpO2: 98 % O2 Device: None (Room air)         General: obese,no acute distress  Eyes: no icterus, no conjunctival injection, no papilledema  Cardiac: regular rate/rhythm, no murmurs/rubs/gallops. 1+ pitting edema of bilateral lower legs and feet.   Pulmonary: clear to auscultation, no wheezes/rales  Skin: dry scaly skin over legs bilaterally and ecchymoses over knee. Open small abrasion wounds over toes bilaterally.  Mental Status: awake and alert      Medical  Decision Making       Data     I have personally reviewed the following data over the past 24 hrs:    8.9  \   10.1 (L)   / 332     135 100 13.2 /  111 (H)   3.7 25 0.95 \     TSH: N/A T4: N/A A1C: N/A     INR:  1.40 (H) PTT:  N/A   D-dimer:  N/A Fibrinogen:  N/A

## 2024-09-27 NOTE — DISCHARGE SUMMARY
[facility]  Discharge Summary - Medicine & Pediatrics       Date of Admission:  9/22/2024  Date of Discharge:  10/3/2024   Discharging Provider: Gabe Curtis MD  Discharge Service: Medicine Service, ANGEL LUIS TEAM 3    Discharge Diagnoses   #Presyncopal episodes likely vasovagal  #HFpEF  #New microcytic Anemia- stable  #Type 2 diabetes w neuropathy  #Chronic ulcerations  #Onychomycosis  #Paroxysmal atrial fibrillation with RVR  #Diffuse Osteopenia   #Passive suicidal ideation  #Hx active suicidal ideation  #MDD/CHRIS  #Hypothyroidism  #HLD  #BPH  #CPAP  #Deconditioning      Resolved issues  #Anion gap metabolic acidosis due to lactic acidosis -resolved   #MICHELLE on CKD 3a, resolved  #Concern for Sepsis, resolved   #Concern for refeeding syndrome -resolved  #Hypokalemia -resolved  #Hypophosphatemia -resolved  #BUE Tremors, paroxysmal    Clinically Significant Risk Factors     # DMII: A1C = 6.5 % (Ref range: <5.7 %) within past 6 months       Follow-ups Needed After Discharge   Follow-up Appointments     Follow Up and recommended labs and tests      Follow up with TCU PCP  for lab check and as needed            Discharge Disposition   Admited to Short term rehab.   Agency: [facility] today .  Discharged to home  Condition at discharge: Stable    Hospital Course   Clarke Moreira is a 74 year old male with T2DM w/ neuropathy and chronic ulcers, pAF, HFpEF, HLD, MDD, on CPAP and uses walker at home presents after 2 presyncopal episodes and admitted for possible sepsis. Although he met current 2/4 SIRS criteria (leukocytosis, tachycardia),there were no evidence of infection (negative blood cultures, UA bland, no abd pain etc). Course c/b by concern for refeeding syndrome and Afib with RVR.The following problems were addressed during his hospitalization:       Paroxysmal atrial fibrillation with RVR, rate controlled  Elevated INR  Admit INR 3.12. EKG 9/23 continues to  show Afib.  Noted to have sweating and heart rates between bradycardia to tachycardia with physical and Occupational Therapy.  - cards consulted:   - ok with increasing pta metoprolol to 150mg daily for a fib with rvr  - reviewed tele and episodes of asystole and V tach are artifact.  - patient safe to discharge from cards perspective  - PTA metoprolol succinate 100 mg daily, increased metoprolol to 150mg daily given a fib with RVR occurring outside of episodes of anxiety or exertion, will monitor for resonse  - warfarin, pharmacy to dose goal 2-3     Hyponatremia, asymptomatic  Na 133 on 10/2, down from 137. Urine studies with Na <20, urine osm 434, and sOsm 288. Although isotonic, suspect he is hypovolumic in the setting of diuretics. s/p 500cc NS bolus.   - BMP check on 10/7     #Presyncopal episodes likely vasovagal  No LOC with falls. These episodes are most likely vasovagal given the context of hypotension and a hot/humid apartment as well as prodrome of light headedness and dizziness.  Cardiac causes of syncope are less likely but still possible given the patient's first episode occurred when raising his arm (coronary steal) and EKG findings of Afib w RVR and nonspecific ST/T wave abnormalities. Neuro causes of syncope less likely given negative head imaging and no evidence of tongue biting though seizures cannot be completely ruled out. Pt pan-scanned orthopaedically to rule out injuries 2/2 syncope without acute findings. OT and PT discharge rec is transitional care facility  - continue ongoing rehab at TCU   - was on cardiac monitoring      #HFpEF  - PTA torsemide 100 mg daily   - PTA Kcl 40 meq BID , held on morning of discharge  - PTA spironolactone 25 mg daily  - BMP check on 10/7     #New microcytic Anemia- stable  Patient reports last outpatient colonoscopy was <5 years ago and normal. He does not recall where the colonoscopy was performed.  Admit Hgb 11.6, currently 9.8. Iron was 14 and ferritin  28.  - daily hgb check  - c/w ferrous sulfate 325 mg supplementation  - [] consider outpatient colonoscopy and further workup     #Type 2 diabetes w neuropathy  #Chronic ulcerations  #Onychomycosis  - WOC consulted - see discharge orders for wound cares  - resume  metformin 500 mg daily    #Diffuse Osteopenia   Osteopenia noted on foot and ankle x-ray on admission. Acute or subacute intra-articular fracture of the medial base of the first proximal phalanx. Vitamin D levels- normal; Calcium low-normal (8.5)  [] Follow up outpatient with PCP or Endocrine, referral placed     #Passive suicidal ideation  #Hx active suicidal ideation  #MDD/CHRIS  Frequent psychology visits. On risk assessment at admission, patient denies suicidal intent or plan. Patient says that as he ages, his desire for self-harm decreases as his end would be a natural process due to old age. 9/24 patient endorsed active SI stating he is looking for ways to kill himself. 9/25 patient no longer has active SI and declined further mental health conversations  - hydroxyzine prn     #Hypothyroidism: PTA levothyroxine 175 mcg daily  #HLD: atorvastatin 40 mg  #BPH: PTA tamsulosin 0.4 mg daily  #Chronic hypercapnic respiratory failure: CPAP at night      Resolved issues  #Anion gap metabolic acidosis due to lactic acidosis -resolved   #MICHELLE on CKD 3a, resolved  Admit Cr. 1.51, baseline Cr 1. Likely in setting of hypovolemia. Improved with hydration. Cr normal on discharge     #Concern for Sepsis, resolved   Patient presented after presyncopal episodes with rigors and leukocytosis of 17.3 with leftward ANC shift and Lactate 3.4. UA bland without dysuria. CTAP with cholelithiasis, otherwise unremarkable. No respiratory sxs. MRSA nares negative. Suspect source from skin abrasions vs dental vs bacterimia, however Dental CT demonstrated no signs of abscess. No infected wounds per podiatry assessment. S/p IVF and on empiric abx. ID stopped empiric abx 9/24  - Blood Cx  9/22: NGTD 24 hours.  - Vanc/zosyn started empirically; 9/22-9/24  - ID consulted: no sings of infections  - Podiatry consulted: s/p wound cleansing at bedside  - Wound care consulted for LE wounds: wound cares  - lymphedema consulted     #Concern for refeeding syndrome -resolved  #Hypokalemia -resolved  #Hypophosphatemia -resolved  Current K+ is 3.3 9/25-9/26, dropped from 3.6 9/22-9/24. Phosphorus currently 2.5 (NL) increased from 2.3 (9/25).  magnesium WNL (2.0). Patient ate for the first time on 9/25 after 2 days of no food. concern refeeding syndrome. Initiated phosphorus standard replacement protocol- RN Managed with improvement in electrolytes.    #BUE Tremors, paroxysmal - resolved  Patient had three episodes of afebrile tremors. 11pm 9/22 nurse noted afebrile pale tremors. Around noon 9/23 patient had afebrile tremors w tachycardia; tachycardia rapidly improved (160->104bpm) with deep breathing exercises. Patient stated he was feeling overheated and anxious. 9/24 early morning patient had tremors; improved with application of 3 warm blankets. EKG demonstrated no acute changes ( Afib w RVR, chronic issue). These tremors are most likely related to patient's anxiety given (1) patient reported anxiety and (2) had a rapid response to deep breathing. Rigors related to infection are less likely (given patient's rapid response to deep breathing exercises and improvement in infectious Sx) but still possible.  -Hydroxyzine 50mg q6h ordered       Consultations This Hospital Stay   PHARMACY TO DOSE VANCO  PHARMACY TO DOSE WARFARIN  INFECTIOUS DISEASE GENERAL ADULT IP CONSULT  DENTAL HYGIENE IP ADULT CONSULT - UU  PODIATRY IP CONSULT  PHYSICAL THERAPY ADULT IP CONSULT  OCCUPATIONAL THERAPY ADULT IP CONSULT  WOUND OSTOMY CONTINENCE NURSE  IP CONSULT  WOUND OSTOMY CONTINENCE NURSE  IP CONSULT  LYMPHEDEMA THERAPY IP CONSULT  WOUND OSTOMY CONTINENCE NURSE  IP CONSULT  PSYCHIATRY IP CONSULT  CARE MANAGEMENT / SOCIAL WORK IP  CONSULT  PHARMACY TO DOSE WARFARIN  SOCIAL WORK IP CONSULT  PHYSICAL THERAPY ADULT IP CONSULT  OCCUPATIONAL THERAPY ADULT IP CONSULT  LYMPHEDEMA THERAPY IP CONSULT  WOUND OSTOMY CONTINENCE NURSE  IP CONSULT  CARDIOLOGY GENERAL ADULT IP CONSULT    Code Status   Full Code       The patient was discussed with [facility]  [facility]  [address]  [place] [address]  Phone: [phone]    Physician Attestation   I saw and evaluated this patient prior to discharge.  I discussed the patient with the resident/fellow and agree with plan of care as documented in the note.      I personally reviewed vital signs, medications, and labs.    I personally spent 35 minutes on discharge activities.    Mary Pickard MD  Date of Service (when I saw the patient): 10/3/24    ______________________________________________________________________    Physical Exam   Vital Signs: Temp: 98.1  F (36.7  C) Temp src: Oral BP: 132/64 Pulse: 112   Resp: 18 SpO2: 97 % O2 Device: None (Room air)    Weight: 367 lbs 11.64 oz    General: obese, no acute distress  Eyes: no icterus, no conjunctival injection, no papilledema  Cardiac: irregular rate/rhythm, no murmurs/rubs/gallops. mild pitting edema of bilateral lower legs and feet with lymphedema wraps  Pulmonary: clear to auscultation, no wheezes/rales  Skin: dry scaly skin over legs bilaterally and ecchymoses over knee. Open small abrasion wounds over toes bilaterally  Mental Status: awake and alert      Primary Care Physician   Henrry Shepard    Discharge Orders      Basic metabolic panel     Magnesium     Adult Endocrine Referral      Primary Care - Care Coordination Referral      Adult Cardiology Eval  Referral      Primary Care - Care Coordination Referral      General info for SNF    Length of Stay Estimate: Short Term Care: Estimated # of Days <30  Condition at Discharge: Stable  Level of care:skilled    Rehabilitation Potential: Fair  Admission H&P remains valid and up-to-date: Yes  Recent Chemotherapy: N/A  Use Nursing Home Standing Orders: Yes     Mantoux instructions    Give two-step Mantoux (PPD) Per Facility Policy Yes     Reason for your hospital stay    You were admitted to the hospital after presyncopal events at home. We evaluated you and thought your falls are secondary to vasovagal episodes or weakness. Our evaluation did not reveal any infection or cardiac or neurological causes. Podiatry and wound care also evaluated the wounds on your legs an provided treatments. Occupational and physical therapy evaluated you and recommended continuing rehab at discharge. We found you have iron deficiency anemia and stared you on iron supplement. You are discharging to a short term rehab to continue rehab. Take you medications as prescribed.     Activity - Up ad charity     Wound care (specify)    Bilateral knees: Every 3 days  Cleanse with Vashe wound cleanser and allow to dry  Paint periwound skin with no sting skin barrier wipe  Cover open  areas with Xeroform guaze and apply a mepilex foam dressing on top.     Upper mid back: every 3 days  Cleanse with Vashe wound cleanser and allow to dry  Paint periwound skin with no sting skin barrier wipe  Cover open  areas with Xeroform guaze and apply a mepilex foam dressing on top.     Bilateral lower legs from below knees to toes: with Lymphedema wrap changes every other day  Moisten 2 rolls  of kerlex with Vashe cleanser, wring out excess  Wrap each leg from toes to just below knees and  soak for 10 minutes.  Apply Atractain/Urea lotion (see EMAR) to tops of toes (not in between toes and feet  Apply Primapore dressings to cover abrasions on tops of toes     Upper extremities and generalized body: Daily and PRN  After bathing, apply sween cream or body lotion to maintain skin integrity and provide moisture to dry skin     Pressure Injury Prevention (PIP) Plan:  If patient  "is declining pressure injury prevention interventions: Explore reason why and address patient's concerns, Educate on pressure injury risk and prevention intervention(s), If patient is still declining, document \"informed refusal\" , and Ensure Care team is aware ( provider, charge nurse, etc)  Mattress: Follow bed algorithm, add Low Air Loss (Air+) mattress pump if skin is very moist or constantly moist.   HOB: Maintain at or below 30 degrees, unless contraindicated  Repositioning in bed: Every 1-2 hours , Left/right positioning; avoid supine, Raise foot of bed prior to raising head of bed, to reduce patient sliding down (shear), and Frequent microturns using positioning wedges, as patient tolerates  Heels: Keep elevated off mattress, Pillows under calves, and Heel lift boots  Protective Dressing: Sacral Mepilex for prevention (#157561),  especially for the agitated patient   Positioning Equipment:None  Chair positioning: Chair cushion (#[id number]) , Repositions self: patient to shift weight every 15 minutes, Assist patient to reposition hourly, Do NOT use a donut for sitting (this increases pressure to smaller area and creates a higher potential for injury) , and Order Bariatric chair cushion    If patient has a buttock pressure injury, or high risk for PI use chair cushion or SPS.  Moisture Management: Perineal cleansing /protection: Follow Incontinence Protocol, Avoid brief in bed, Clean and dry skin folds with bathing , Consider InterDry (#114755) between folds, and Moisturize dry skin  Under Devices: Inspect skin under all medical devices during skin inspection , Ensure tubes are stabilized without tension, and Ensure patient is not lying on medical devices or equipment when repositioned  Ask provider to discontinue device when no longer needed.     Follow Up and recommended labs and tests    Follow up with TCU PCP  for lab check and as needed     Physical Therapy Adult Consult    Evaluate and treat as clinically " indicated.    Reason:  weakness     Occupational Therapy Adult Consult    Evaluate and treat as clinically indicated.    Reason:  weakness     Lymphedema Therapy Adult Consult    Evaluate and treat as clinically indicated.    Reason:  weakness and leg swelling     Fall precautions     Diet    Follow this diet upon discharge: Current Diet:Orders Placed This Encounter      Combination Diet Regular Diet     CBC with Platelets & Differential       Significant Results and Procedures     Discharge Medications   Current Discharge Medication List        START taking these medications    Details   chlorhexidine (PERIDEX) 0.12 % solution Swish and spit 15 mLs in mouth 2 times daily. For oral care    Associated Diagnoses: Physical deconditioning      ferrous sulfate (FEROSUL) 325 (65 Fe) MG tablet Take 1 tablet (325 mg) by mouth every other day. For iron deficiency anemia    Associated Diagnoses: Anemia, unspecified type      mineral oil-white petrolatum (EUCERIN/MINERIN) cream Apply topically every hour as needed for dry skin. Dry skin    Associated Diagnoses: Physical deconditioning      urea (CARMOL) 10 % external cream Apply topically 2 times daily as needed for dry skin. Dry skin    Associated Diagnoses: Physical deconditioning           CONTINUE these medications which have CHANGED    Details   metoprolol succinate ER (TOPROL XL) 100 MG 24 hr tablet Take 1.5 tablets (150 mg) by mouth daily.    Associated Diagnoses: Paroxysmal atrial fibrillation (H)           CONTINUE these medications which have NOT CHANGED    Details   ammonium lactate (LAC-HYDRIN) 12 % external cream Apply topically 2 times daily as needed for dry skin  Qty: 385 g, Refills: 11    Associated Diagnoses: Dry skin      aspirin (ASA) 81 MG tablet Take 1 tablet (81 mg) by mouth daily  Qty: 30 tablet, Refills: 0    Associated Diagnoses: Essential hypertension      atorvastatin (LIPITOR) 40 MG tablet Take 1 tablet (40 mg) by mouth daily  Qty: 90 tablet,  Refills: 3    Associated Diagnoses: Type 2 diabetes mellitus without complication, without long-term current use of insulin (H)      CERTAVITE/ANTIOXIDANTS tablet TAKE ONE TABLET BY MOUTH ONE TIME DAILY  Qty: 100 tablet, Refills: 0    Associated Diagnoses: Type 2 diabetes mellitus without complication, without long-term current use of insulin (H)      levothyroxine (SYNTHROID/LEVOTHROID) 175 MCG tablet Take 1 tablet (175 mcg) by mouth every morning (before breakfast)  Qty: 90 tablet, Refills: 3    Associated Diagnoses: Hypothyroidism, unspecified type      metFORMIN (GLUCOPHAGE) 500 MG tablet Take 1 tablet (500 mg) by mouth 2 times daily (with meals)  Qty: 180 tablet, Refills: 3    Associated Diagnoses: Type 2 diabetes mellitus without complication, without long-term current use of insulin (H)      nystatin (MYCOSTATIN) 890858 UNIT/GM external cream Apply topically 2 times daily as needed for dry skin  Qty: 30 g, Refills: 11    Associated Diagnoses: Dry skin      Omega-3 Fatty Acids (EQL FISH OIL) 1000 MG CAPS Take 1 capsule by mouth daily  Qty: 90 capsule, Refills: 0    Associated Diagnoses: Hyperlipidemia LDL goal <100      potassium chloride chen ER (KLOR-CON M20) 20 MEQ CR tablet Take 2 tablets (40 mEq) by mouth 2 times daily  Qty: 240 tablet, Refills: 3    Associated Diagnoses: Chronic heart failure with preserved ejection fraction (H)      senna-docusate (SENEXON-S) 8.6-50 MG tablet Take 1 to 2 tablets by mouth 2 times daily as needed for constipation  Qty: 180 tablet, Refills: 0    Associated Diagnoses: Constipation, unspecified constipation type      spironolactone (ALDACTONE) 25 MG tablet Take 2 tablets (50 mg) by mouth daily  Qty: 180 tablet, Refills: 3    Associated Diagnoses: Heart failure with preserved ejection fraction, unspecified HF chronicity (H)      tamsulosin (FLOMAX) 0.4 MG capsule Take 1 capsule (0.4 mg) by mouth daily  Qty: 90 capsule, Refills: 3    Associated Diagnoses: Urinary retention       torsemide (DEMADEX) 100 MG tablet Take 1 tablet (100 mg) by mouth daily  Qty: 90 tablet, Refills: 3    Associated Diagnoses: Chronic heart failure with preserved ejection fraction (H)      vitamin C (ASCORBIC ACID) 1000 MG TABS Take 1,000 mg by mouth daily      Vitamin D, Cholecalciferol, 25 MCG (1000 UT) CAPS Take 1 capsule by mouth daily.      vitamin E (VITAMIN E COMPLEX) 1000 units (450 mg) capsule Take 1 capsule (1,000 Units) by mouth daily  Qty: 100 capsule, Refills: 3    Associated Diagnoses: Adequate nutrition      warfarin ANTICOAGULANT (COUMADIN) 5 MG tablet Take 5 mg (5 mg x 1) every Thu; 10 mg (5 mg x 2) all other days or as directed by the INR clinic.  Qty: 180 tablet, Refills: 3    Associated Diagnoses: Persistent atrial fibrillation (H)      !! order for DME Equipment being ordered: Diabetes Shoes  Qty: 1 Units, Refills: 0    Associated Diagnoses: Type 2 diabetes mellitus with hyperglycemia, without long-term current use of insulin (H)      !! order for DME Equipment being ordered: Custom diabetic shoes  Qty: 1 Units, Refills: 0    Associated Diagnoses: Type 2 diabetes mellitus without complication, without long-term current use of insulin (H)      !! order for DME Equipment being ordered: Bariatric Lift chair  Diagnosis - morbid obesity, CHF, Lymphedema    Fax to Ne from [id number] at [phone].  Qty: 1 Units, Refills: 0    Associated Diagnoses: Chronic systolic congestive heart failure (H); Lymphedema; Morbid obesity (H)      !! order for DME Equipment being ordered: Other: Velcro Compression stockings.   Treatment Diagnosis: bilateral lymphedema.  Qty: 2 each, Refills: 0    Associated Diagnoses: Lymphedema of extremity      vitamin B complex with vitamin C (VITAMIN  B COMPLEX) tablet Take 1 tablet by mouth daily  Qty: 100 tablet, Refills: 3    Associated Diagnoses: Adequate nutrition       !! - Potential duplicate medications found. Please discuss with provider.        Allergies   Allergies    Allergen Reactions    Seasonal Allergies

## 2024-09-27 NOTE — PLAN OF CARE
/63 (BP Location: Left arm)   Pulse 101   Temp 98.1  F (36.7  C) (Oral)   Resp 20   SpO2 98%      Goal Outcome Evaluation:      Plan of Care Reviewed With: patient    Overall Patient Progress: no change    Pt is A/O x4; VSS on RA. Tele monitoring for Afib; HR elevation w/ activity. Pain 5/10 in shoulders but declines pain meds and states it's not unbearable. LBM 09/27; Voids spont. w/ AUOP. A/1 in bed for cares (pt moves and turns himself in bed); weight shifting encouraged. Pt calls appropriately. Continue to follow POC

## 2024-09-27 NOTE — PLAN OF CARE
Goal Outcome Evaluation:      Plan of Care Reviewed With: patient    Overall Patient Progress: no changeOverall Patient Progress: no change    Outcome Evaluation: Pt no longer using PW w/suction d/t multiple failures/wet sheets. Instead using urinal. Tele reading norberto STILES.

## 2024-09-27 NOTE — DISCHARGE INSTRUCTIONS
Organizing - Household    Brilliant Moves  Francisco [phone]  Creating Comfortable Homes for Seniors  www.FanFoundvesmn.ZexSports.com    Caring Transitions   [phone]  Senior Relocation  Downsizing  Estate Sales. Certified Relocation  Transitions Specialist.    Gentle Transitions, a [employer]  [phone] / [phone]  Serving the [place] & Outlying Areas Since 1990  www.ITN.Wikirin    Home to [place]  [phone]  The Heart of Moving Matters . Personal Help to Sort, Downsize,  Pack, Move & Resettle.  www.Become Media Inc..ZexSports.com    Senior Life Management  [phone]  Creating a Plan to Navigate the Senior Years. Consulting, Downsizing,  Home Modifications, Placement / Moving, Aging in Place & Real Estate.  www.seniorlifemanagement.net    Senior Move Sonia  [phone]  Organize-Declutter-Pack-Resettle-Clean Outs. Serving Clients  of All Ages & Stages with Care.  www.seniormovemagic.com    Seniors Helping Seniors  [phone]  Serving [place] & [facility] Areas  www.seniorshelpingseniors.com    [facility] [place]  [phone]  Serving the [facility]  www.Haloband.ZexSports.com    [facility] [place]  [phone]  Serving the [facility]  www.Haloband.ZexSports.com    [facility] [place]  [phone]  Serving [facility] & [facility]  www.Haloband.ZexSports.com    [facility] [place]  [phone]  Serving the [facility], [facility]  www.Haloband.ZexSports.com    [facility] [place]  [phone]  Serving [facility] & [facility]  www.Haloband.ZexSports.com    [facility] of [place]  [phone]  Serving the [facility]  www.Haloband.ZexSports.com    Clutter 911 [phone]  Senior Move Management, Downsizing & Decluttering. Easing  the Pain of Transition.  www.bgdsqcc896.ZexSports.com    Comfort Keepers -  South  [phone]  Comforting Solutions for In-Home Care. Serving the South  [facility].  www.Manifest.Beijing PingCo Technology/offices/[place]/[facility]    Comfort Keepers - West  [phone]  Serving the West, South[place] & [facility]. Star  [place] Top 100 Work Places, 2012 & 2017.  www.Icontrol Networks.[place] Used [employer] Equipment    ____________________________________________________________________________________    Food Home Delivery*    If you are unable to leave your home, there are food programs that are doing home delivery for free or at a reduced cost. Some programs deliver food-shelf groceries and others deliver meals.    How to get home-delivered food    Option #1: [facility] schools offer free food delivery right now to enrolled students. Reach out to your [facility] school website.    Option #2: You can also reach out to a food shelf/food program that delivers.     Here is a list of some in [facility]:    [facility] Food Shelf Delivery & Home Meal Delivery Service  [phone]  Languages: English, Bahraini, East Timorese  Serves residents of [facility] zip codes [address], [address] and [address]    Pre-register online. If you have any questions, call/text [phone] or email [email or web address]. Email or text is preferred due to a high volume of calls received each day.    Food Shelf (Grocery) Delivery:   Any household can receive one food shelf box every 2 weeks.   The grocery list needs to be sent via email and/or text.  Meal Delivery, for Kids and Seniors: youth ages birth to 18 years, and older adults ages 65 and older can get home-delivered meals.  Each registered youth gets 7 breakfasts & 7 lunches delivered every week (total of 14 meals per week).  Each registered senior gets 7 lunches delivered every week    [facility] Food Shelf  [address] [place], [place] [id number] [place], [place] [phone]  COVID Hours: Monday & Wednesday:  5:00-7:00 pm, and Tuesday & Thursday: Noon-2:00 pm  Languages: English, Macedonian  Serves residents of [place], [place], [place], [facility], [place], and [place]    Home Delivery: any homebound resident can receive this service (not exclusive to seniors). Call [phone] during COVID open hours if you need home delivery.    [facility] Food Shelf  [address] [place] [address], [phone]   Hours: Monday - Friday 8 am-4:30 pm  Languages: English, Macedonian, Czech; have some support for Mandarin and Welsh  Serves residents in [facility], and parts of [facility] - south [address] & west [facility])    Home Delivery: This is free to people who have 1) previously participated in the ride-home program, 2) are homebound or have limited mobility, or 3) are unable to use transportation. To make an appointment to receive food or to speak with a member of our  team: please call [phone] Monday-Friday, between 8 am-4:30 pm.    Help at Your Door  [phone]  EBT/SNAP benefits accepted for grocery delivery  Serves seniors and those with disabilities residing in [facility] in [facility]    Services are for:  People recovering from hospital stays  People with sensory or mobility impairment  Seniors and people with disabilities who need a little extra help to stay in their homes  Grocery Help includes:    Orders can be placed over the phone or online  Delivery of grocery items and help with putting items away  EBT payments/SNAP benefits are accepted, *There is a sliding scale fee for grocery help, based on household size and earnings.  Schedule Grocery Help Service today by completing a request form, or call and leave a message at [phone].  Must be signed up to get food - once you are, it may take between 2-5 days to receive food.    [facility] of [place]   Client Services associates at [phone].  Phones are  answered Monday - Thursday 8am-4pm, and Friday 8am-3pm.  https://www.opensaperatec.org/get-meals/    [facility] is a nonprofit that prepares & delivers free medically tailored meals (for those who qualify) to [place] residents diagnosed with HIV/AIDS, cancer, MS, ALS, ESRD, CHF, and COPD. Our loving community of donors and volunteers helps us provide these meals to our clients and their loved ones completely free of cost. At [employer], we believe that food is medicine and there's always room for one more at our table. Delivery area covers the [facility] and surrounding suburbs.     Not living with HIV/AIDS, cancer, MS, ALS, COPD, CHF or ESRD but still in need of meals? Other ways to access our program include waivers (CADI, Elderly Waiver, Alternative Care Waiver, Disabilities Waiver, etc) and our private pay option. Call Client Services at [phone] for more information!     Meals on Wheels [facility]  [phone]  Fresh, Hot & Frozen Meals Locally & -Prepared. Home  Delivery & Check-in by a Caring Volunteer.  www.mealsonwheels-rc.org    [facility] Meals on Wheels  [phone]  Fresh, Nutritious Meals Delivered Daily or Frozen Meals Delivered  Weekly. Order Online or Call to Speak With an .  meals-onDataherowheels.Rhone Apparel    Optage  Meals  [phone]  Support to Live Well. Over 55 Nutritious Meal Choices-Fresh,  Hot or Frozen.  www.optage.org    Senior Community Services  [phone]  [address], [place] [address]  www.seniorcommunity.org    Senior LinkAge Line   [phone]  Free Service of the [facility] to Connect Older Adults  & Caregivers to the Help They Need  www.Grocery Shopping Networkageline.org  ____________________________________________________________________________________    Maintenance - Household Resources*    [facility] Community Action Program ([facility])  Senior Chores & More Program................... [phone]  Turn Your  Passion into Action  www.accap.org/senior-services/chores-more/    DARTS.......................................................... [phone] [address] [place] [address]  Aging & Caregiving Resources  www.dartsconnects.org    [employer] of [place]  Ismael Khan................................................ [phone]  Customer Focused, Quality Driven  www.Mira Designs/[facility] Services....................... [phone]  [address], [place] [address]  www.seniorcommunity.org    Senior LinkAge Line ................................. [phone]  Free Service of the [facility] to Connect Older Adults  & Caregivers to the Help They Need  www.seniorlinkageline.org    [facility] Home Service [place] ......................... [phone]   & Senior Home Modifications - [place],  Ino[place] & Surrounding Area  www.DynaPump.CyVek/[employer]ent[employer]-One2start-Waizy.com  ____________________________________________________________________________________    Moving Resources/Junk Removal Resources*    AAA   [phone]  Moving, Packing, Storage & Junk Removal  www.Procurics.com  [place] Moves  Laurel irma Naik........................................... [phone]  Creating Comfortable Homes for Seniors  www.MindBites    Caring Transitions  [phone]  Senior Relocation  Downsizing  Estate Sales. Certified Relocation  Transitions Specialist.  www.caringtransitionstccentral.CyVek    Changing Lifestyle Solutions  Laura Hsieh [phone]  Relocation Services - Anytime, Anywhere  www.Wootocracy    Clutter 911  [phone]  Senior Move Management, Downsizing & Decluttering. Easing  the Pain of Transition.  www.eeylbes576.CyVek    Junk 360  Jeht339 will , remove, haul away, and recycle almost anything you can fit in our trucks. Just point to the items you need removed and watch us carry it out,  load it in our trucks to haul away, and sweep up for you so that everything is as fresh and clean as a new space. We can help with just one item or an office/home full of unwanted junk. Uref145 also specializes in estate and home clearance. And at the end of the day, we ensure that everything that can be repurposed is donated, recycled or reused to ensure we reduce our impact on local landfills. Call or visit our website for a free estimate.    Tel: [phone]  Email: [email or web address]  Website: http://www.LetMeHearYa, a Well[employer]ive Company  (739) 466-1951 / ([phone]  Serving the [place] & Outlying Areas Since 1990  www.Trading Blox    Home to [place]  [phone]  The Heart of Moving Matters . Personal Help to Sort, Downsize,  Pack, Move & Resettle.  www.Jelly Button Games    Viviana's Daughters  [phone] / [phone]  Serving the Senior Community in the [facility] Since 2002  www.UDeserve Technologies    Senior Life Management  [phone]  Creating a Plan to Navigate the Senior Years. Consulting, Downsizing,  Home Modifications, Placement / Moving, Aging in Place & Real Estate.  www.seniorlifemanagement.net    Senior Move Sonia  [phone]  Organize-Declutter-Pack-Resettle-Clean Outs. Serving Clients  of All Ages & Stages with Care.  www.Entegrion.Cytocentrics    *Updated 2024. Not a complete list of community resources. All information subject to change without notice.   _______________________________________________________________________________________    [place] Department of [place] Guide to Applying for Medical Assistance (MA)    Medical Assistance ([facility] Medicaid program) provides medical assistance to low income citizens, children and families, and people with disabilities. To qualify for Medical Assistance, residents must meet income and asset limit eligibility guidelines. For more information about these eligibility  requirements, you should contact your county s human services agency. To view a directory of these agencies by [facility], visit the [facility] Human Services Directory.external link icon You can also call the [place] Health Care Programs Help Desk at ([phone] [phone] or [phone]. Additional information about Medical Assistance, including application instructions, is available from the [place] Department of Human Services. FAQs about [place]: https://[place].gov/dhs/people-we-serve/people-with-disabilities/health-care/health-care-programs/resources/faqs.jsp.    Eligibility    To get coverage, you must:     Be a [place] resident  Be a U.[place] citizen or a qualifying noncitizen  Provide a Social Security number for each person requesting MA, unless an exception is met  Meet the income limit and asset limit, if any  Meet any other program rules.    What is the income limit?    The income limit (https://[place].gov/dhs/people-we-serve/adults/health-care/health-care-programs/programs-and-services/income-asset-limits.jsp) and calculations depend on your age and who lives with you. If you are pregnant, blind or have a disability, you also may have a different income limit. Some people who do not meet the income limit still may qualify using a spenddown (https://edocs.dhs.[facility].mn./lfserver/Public/DHS-3017-ENG). A spenddown is like an insurance deductible. This means you are responsible for some medical bills before MA pays.    What is the asset limit?    Assets are items people own like cars, checking and savings accounts, your home and financial investments.    Generally, there is no asset limit for MA for parents, children under 21 and adults without children in the home.  Parents and caretaker relatives eligible for MA with a spenddown have an asset limit (https://edocs.dhs.[facility].mn./lfserver/Public/DHS-3461A-ENG).  Seniors and people age 21 and older who are blind or have a disability have an asset limit  (https://edocs.dhs.[facility]./lfserver/Public/DHS-3461A-ENG).  Assets that do not count toward the limit include the home where you live, household goods, personal items like clothing and jewelry, and certain assets owned by an .    What if I have other insurance?    You still may qualify for MA. You must tell us if you have other health insurance (https://mn.[facility]/dhs/people-we-serve/adults/health-care/health-care-programs/programs-and-services/other-insurance.jsp) or could get coverage through an employer or  service. Sometimes we can pay the cost of the other insurance so you can keep that coverage.    Benefits    What is covered and how much does it cost?    MA pays for a variety of services like doctor visits, prescriptions and hospital stays. Some services and prescriptions may require prior approval.     For some members, there is no cost. Others may have to pay a portion of the cost of a service. This may include copays, deductibles or spenddowns.     A summary of covered services and costs is online (https://mn.[facility]/dhs/people-we-serve/adults/health-care/health-care-programs/programs-and-services/ma-coverage.jsp).   A printable summary of covered services and costs (https://edocs.[facility]./lfserver/Public/DHS-3860-ENG) is also available.   You will get more details on covered services after your application is approved.     When does coverage start?    MA may pay for medical bills going back three months from the month we get your application.    Apply    There are different ways to apply for MA, depending on what type of coverage you need. Learn more about applying for coverage:    Adults - how to apply (https://[place].gov/dhs/people-we-serve/adults/health-care/health-care-programs/programs-and-services/adults-apply.jsp)  Children and families - how to apply  (https://[place].gov/dhs/people-we-serve/children-and-families/health-care/health-care-programs/programs-and-services/children-families-apply.jsp)  People with disabilities - how to apply (https://mn.[facility]/dhs/people-we-serve/people-with-disabilities/health-care/health-care-programs/programs-and-services/disabilities-apply.jsp)  Seniors - how to apply (https://mn.[facility]/people-we-serve/seniors/health-care/health-care-programs/programs-and-services/seniors-apply.jsp)    Getting advice about how to apply for and plan for Medical Assistance    Private attorneys and  attorneys can answer questions about estate planning.   For a list of up-to-date elder law attorneys, please access https://www.careoptionsnetwork.org/upload_elifinder/ServicesForSeniors_YellowPages.pdf  [place] State Bar Association (https://www.[place]bar.org/member-directory/find-a-)   (https://mylegalaid.org/get-legal-help/)    Additional resources:   https://[place].gov/dhs/people-we-serve/adults/health-care/health-care-programs/programs-and-services/medical-assistance.jsp  https://[place].gov/dhs/people-we-serve/adults/health-care/health-care-programs/programs-and-services/estate-recovery.jsp  ______________________________________________________________________________________    Elderly Waiver program    What is the Elderly Waiver program?    The Elderly Waiver program funds home and community-based services for people age 65 and older who are  eligible for Medical Assistance and require the level of care provided in a nursing home but choose to live in the  community. The [place] Department of Human Services operates the program under a federal waiver to  [place] s Medicaid state plan. Counties, [place] entities and health plan partners administer the program.    What types of services are available?    Covered services include:   Adult day service   Case management   Changes to make homes and equipment accessible   Chore services     services   Consumer-directed community supports   Home health aides   Home-delivered meals   Homemaker services   Licensed community residential services, including customized living services, family and corporate foster care and residential care   Nonmedical transportation   Personal care   Personal emergency response systems   Respite care   Skilled nursing   Specialized equipment and supplies   Supports during transitions between different types of housing   Training and support for family caregivers    Who is eligible?    A person age 65 or older who is assessed through the Long-Term Care Consultation process is eligible for the Elderly Waiver program when the following criteria are met:     The person is eligible for Medical Assistance and in need of nursing home level of care as determined by the Long-Term Care Consultation process.   The cost for a person s Elderly Waiver services cannot be greater than the estimated nursing home cost for that person.   The person chooses to receive services in the community instead of nursing facility services.    How many people? How many dollars?    In jeny year 2016, the program served 29,822 people at a total cost of $401,853,154. The average monthly  participant population for fiscal year 2016 was 23,303 with an average monthly cost of $1,609 under fee-forservice purchase and $1,340 per participant per month under managed care.  Ninety-two percent of participants receive their services through a managed care organization. Managed care  program options include:     [place] Senior Health Options, an integrated Medicaid/Medicare health and long-term care program   [place] Senior Care Plus, a Medicaid health and long-term care option.    What alternatives exist for people who are eligible for the Elderly Waiver?    Alternatives to the Elderly Waiver include Medicaid-certified skilled nursing facilities and certified board-andcare homes. The average cost of  these alternative settings is $6,783 per person, per month.    Where can I learn more about the Elderly Waiver?    More information is available on the [place] Department of Human Services website, [place].gov/dhs, and in  [place] Statutes 256B.0915. Information is also available by calling the Senior Linkage Line at [phone]  or going to [employer].    How can I enroll?    Contract your county or [place] for a Long-Term Care Consultation. If you are already on Medical Assistance and enrolled in a health plan, you should contact your health plan.    _____________________________________________________________________________________    Applying for County Benefits  Food assistance (SNAP), cash programs, emergency assistance, housing support, childcare assistance  1) You will need a device that can access the internet (smartphone, tablet, etc)  2) You will need electronic copies of documents such as bank statements, rent receipts, or medical bills  3) Visit this website: https://mnbenefits.[place].gov/  4) Follow the prompts to complete the application.  5) Your application submission is the earliest date your benefits can begin  6) Look for a letter in the mail or a phone call from your [facility] or [facility] nation.  They will reach out for the interview portion.  If you are in need of further support, you can reach out to your [facility]'s Department of Human Services for assistance.   [facility] Phone: [phone]. Toll free: [phone]. Hours: M-F, 9 a.m. to 3 p.m.  ____________________________________________________________________________    Senior Linkage Line   (Ph: [phone]) - The Senior LinkAge Line is a service of the [place] Board on Aging in partnership with [place]'s [facility] agencies on aging.    The senior linkage line can help connect you to these additional resources:    Adult day services are provided at a community setting where people come for several hours a day to receive medical,  social, and recreation services. They are usually offered during the daytime, which helps people who may be caring for a loved one while working.  Assisted transportation helps people get to appointments and other necessary places. It can include pbkl-kn-mnuz van service, discount taxi programs, and volunteer drivers and escorts.  Caregiver services can include respite (a break from caregiving), information, referrals to services, and training or support groups.  Care assessment and management, most often by a nurse or , can help assess a person s needs, develop a plan of care, and arrange and monitor services.  Friendly visitors are volunteers who stop by regularly to see how you re doing.  Home care services provide help with personal care like bathing or getting dressed.  Home health care includes nursing and physical, speech or occupational therapy for a specific condition.  Homemaker/chore services help with housekeeping and preparing meals, or chores like mowing the lawn and shoveling snow.  Information and assistance specialists provide information and connect you to local resources and services.  Meals may be delivered to your home or served in a senior center or community facility.  Senior centers offer meals, recreation, classes, information and referral services, volunteer opportunities, employment services, public benefits counseling, and much more.

## 2024-09-28 ENCOUNTER — APPOINTMENT (OUTPATIENT)
Dept: OCCUPATIONAL THERAPY | Facility: CLINIC | Age: 74
DRG: 812 | End: 2024-09-28
Payer: COMMERCIAL

## 2024-09-28 LAB
ANION GAP SERPL CALCULATED.3IONS-SCNC: 12 MMOL/L (ref 7–15)
BUN SERPL-MCNC: 16.7 MG/DL (ref 8–23)
CALCIUM SERPL-MCNC: 8.7 MG/DL (ref 8.8–10.4)
CHLORIDE SERPL-SCNC: 100 MMOL/L (ref 98–107)
CREAT SERPL-MCNC: 0.97 MG/DL (ref 0.67–1.17)
EGFRCR SERPLBLD CKD-EPI 2021: 82 ML/MIN/1.73M2
ERYTHROCYTE [DISTWIDTH] IN BLOOD BY AUTOMATED COUNT: 15.2 % (ref 10–15)
GLUCOSE BLDC GLUCOMTR-MCNC: 141 MG/DL (ref 70–99)
GLUCOSE BLDC GLUCOMTR-MCNC: 163 MG/DL (ref 70–99)
GLUCOSE SERPL-MCNC: 116 MG/DL (ref 70–99)
HCO3 SERPL-SCNC: 25 MMOL/L (ref 22–29)
HCT VFR BLD AUTO: 34.7 % (ref 40–53)
HGB BLD-MCNC: 10.5 G/DL (ref 13.3–17.7)
INR PPP: 1.68 (ref 0.85–1.15)
MCH RBC QN AUTO: 23.2 PG (ref 26.5–33)
MCHC RBC AUTO-ENTMCNC: 30.3 G/DL (ref 31.5–36.5)
MCV RBC AUTO: 77 FL (ref 78–100)
PHOSPHATE SERPL-MCNC: 2.8 MG/DL (ref 2.5–4.5)
PHOSPHATE SERPL-MCNC: 3.1 MG/DL (ref 2.5–4.5)
PLATELET # BLD AUTO: 346 10E3/UL (ref 150–450)
POTASSIUM SERPL-SCNC: 4 MMOL/L (ref 3.4–5.3)
RBC # BLD AUTO: 4.52 10E6/UL (ref 4.4–5.9)
SODIUM SERPL-SCNC: 137 MMOL/L (ref 135–145)
WBC # BLD AUTO: 9 10E3/UL (ref 4–11)

## 2024-09-28 PROCEDURE — 250N000013 HC RX MED GY IP 250 OP 250 PS 637: Performed by: STUDENT IN AN ORGANIZED HEALTH CARE EDUCATION/TRAINING PROGRAM

## 2024-09-28 PROCEDURE — 250N000013 HC RX MED GY IP 250 OP 250 PS 637

## 2024-09-28 PROCEDURE — 250N000013 HC RX MED GY IP 250 OP 250 PS 637: Performed by: INTERNAL MEDICINE

## 2024-09-28 PROCEDURE — 36415 COLL VENOUS BLD VENIPUNCTURE: CPT

## 2024-09-28 PROCEDURE — 85027 COMPLETE CBC AUTOMATED: CPT

## 2024-09-28 PROCEDURE — 97535 SELF CARE MNGMENT TRAINING: CPT | Mod: GO | Performed by: OCCUPATIONAL THERAPIST

## 2024-09-28 PROCEDURE — 99232 SBSQ HOSP IP/OBS MODERATE 35: CPT | Mod: GC | Performed by: INTERNAL MEDICINE

## 2024-09-28 PROCEDURE — 84100 ASSAY OF PHOSPHORUS: CPT

## 2024-09-28 PROCEDURE — 120N000002 HC R&B MED SURG/OB UMMC

## 2024-09-28 PROCEDURE — 85610 PROTHROMBIN TIME: CPT

## 2024-09-28 PROCEDURE — 80048 BASIC METABOLIC PNL TOTAL CA: CPT

## 2024-09-28 RX ADMIN — SPIRONOLACTONE 50 MG: 50 TABLET ORAL at 08:22

## 2024-09-28 RX ADMIN — OXYCODONE HYDROCHLORIDE AND ACETAMINOPHEN 1000 MG: 500 TABLET ORAL at 08:23

## 2024-09-28 RX ADMIN — TORSEMIDE 100 MG: 100 TABLET ORAL at 12:20

## 2024-09-28 RX ADMIN — Medication 1 TABLET: at 12:20

## 2024-09-28 RX ADMIN — ATORVASTATIN CALCIUM 40 MG: 40 TABLET, FILM COATED ORAL at 08:23

## 2024-09-28 RX ADMIN — METOPROLOL SUCCINATE 100 MG: 50 TABLET, EXTENDED RELEASE ORAL at 08:23

## 2024-09-28 RX ADMIN — Medication 25 MCG: at 08:24

## 2024-09-28 RX ADMIN — TAMSULOSIN HYDROCHLORIDE 0.4 MG: 0.4 CAPSULE ORAL at 08:23

## 2024-09-28 RX ADMIN — SENNOSIDES AND DOCUSATE SODIUM 1 TABLET: 8.6; 5 TABLET ORAL at 08:23

## 2024-09-28 RX ADMIN — LEVOTHYROXINE SODIUM 175 MCG: 0.05 TABLET ORAL at 08:23

## 2024-09-28 RX ADMIN — WARFARIN SODIUM 12.5 MG: 10 TABLET ORAL at 17:50

## 2024-09-28 RX ADMIN — POTASSIUM CHLORIDE 40 MEQ: 750 TABLET, EXTENDED RELEASE ORAL at 08:23

## 2024-09-28 RX ADMIN — B-COMPLEX W/ C & FOLIC ACID TAB 1 TABLET: TAB at 08:24

## 2024-09-28 RX ADMIN — POTASSIUM CHLORIDE 40 MEQ: 750 TABLET, EXTENDED RELEASE ORAL at 19:40

## 2024-09-28 ASSESSMENT — ACTIVITIES OF DAILY LIVING (ADL)
ADLS_ACUITY_SCORE: 42
ADLS_ACUITY_SCORE: 41
ADLS_ACUITY_SCORE: 42
ADLS_ACUITY_SCORE: 42
ADLS_ACUITY_SCORE: 37
ADLS_ACUITY_SCORE: 42
ADLS_ACUITY_SCORE: 41
ADLS_ACUITY_SCORE: 42
ADLS_ACUITY_SCORE: 37
ADLS_ACUITY_SCORE: 37
ADLS_ACUITY_SCORE: 42

## 2024-09-28 NOTE — PLAN OF CARE
Goal Outcome Evaluation:      Plan of Care Reviewed With: patient    Overall Patient Progress: improving    Outcome Evaluation: Alert and oriented x 4. Denies pain. Up to bedside commode wit assist of 2 and gait belt. Had 1 BM this shift. Vital signs stable on room air. Plan s to discharge to TCU

## 2024-09-28 NOTE — PLAN OF CARE
Goal Outcome Evaluation:      Plan of Care Reviewed With: patient    Overall Patient Progress: improvingOverall Patient Progress: improving    Alert and oriented. VSS. Bilateral knee dressings changed per POC. Denies pain. Tolerating regular diet. Voiding per urinal. Plan is to discharge to TCU.

## 2024-09-28 NOTE — PLAN OF CARE
Goal Outcome Evaluation:      Plan of Care Reviewed With: patient      A&Ox4. VSS on RA x intermittent tachycardia reaching 160s at rest. No c/o pain, N/V/D. Significant urine output during shift- uses urinal at bedside. Uses call bell appropriately. Good appetite. Very pleasant patient. Pt eager to regain strength at TCU.

## 2024-09-28 NOTE — PROGRESS NOTES
Care Management Follow Up    Length of Stay (days): 6  Expected Discharge Date: 10/02/2024  Concerns to be Addressed: discharge planning, home safety     Patient plan of care discussed at interdisciplinary rounds: Yes  Anticipated Discharge Disposition: Transitional Care  Anticipated Discharge Services: Transportation Services  Anticipated Discharge DME: None  Patient/family educated on Medicare website which has current facility and service quality ratings: yes  Education Provided on the Discharge Plan: Yes  Patient/Family in Agreement with the Plan: yes  Referrals Placed by CM/SW: Post-acute referrals    Active TCU Referrals:    [facility] in [place] (P: [phone])  9/27: Initial referral placed.  9/28: Per [facility] liaison Beth, no available beds today. Will clinically review.    Inactive Referrals:    [facility] - Bariatric Needs.    Private pay costs discussed: Not applicable  Discussed  Partnership in Safe Discharge Planning  document with patient/family: Yes  Handoff Completed: No, handoff not indicated or clinically appropriate  Additional Information: Per Maria 3 Provider, this pt is medically ready for discharge. PT/OT disposition recommendations are for TCU. One discharge barrier is his bariatric needs.     met with pt at bedside to discuss med readiness and expanding TCU search. Partnership in Safe Discharge Planning provided along with education and Medicare Care Compare Tool. Additional 7-10 TCU choices requested by end of day.    Ciror returned to meet at bedside with pt for extended visit. Pt concerned about not being able to reach admissions for the TCUs on the Medicare list and not wanting to provide choices until he can vet the facilities more. Pt does not use the internet, so is unable to research the facilities that way. Writer reviewed Partnership in Safe Discharge planning verbally with the pt and left a copy at bedside. Writer  reviewed the Medicare list and multiple facilities and discussed the pt's unique needs. Pt agreeable to this writer placing referrals over the weekend. Writer discuss discharge transport and pt made aware of discharge stretcher costs. Pt requested resources for home chore agency and home food delivery services. Pt requested an MA referral.    Next Steps: Care management team will place referrals to TCU facilities that are highly rated on the Medicare website and can meet the pt's bariatric needs, will place a financial counseling referral Re: MA, and will provide a list of home chore agencies and home food delivery agencies to place in pt's AVS.    Naida Breen, AARON, LICSW  Weekend Coverage Clinical , [facility]-7B & 7C   Phone: [phone]  Vocera  Social Work & Care Management Department      SEARCHABLE in [facility] - search SOCIAL WORK      [place] (0800 - 1630) Saturday and Sunday      Units: 4A Vocera, 4C Vocera, & 4E Vocera         Units: 5A 7363-9653 Vocera, 5A 1015-5209 Vocera , BMT SW 1 BMT SW 2, BMT SW 3 & BMT SW 4  5C Off Service 5401 - 5416  5C Off Service [id number]      Units: 6A Vocera & 6B Vocera       Units: 6C Vocera      Units: 7A Vocera & 7B Vocera       Units: 7C Med Surg 7401 thru 7418 and 7C Med Surg 7502 thru 7521       Unit: [place] ED Vocera & [place] Obs Vocera      [facility] ([phone]) Saturday and Sunday       Units: 5 Ortho Vocera, 5 Med Surg Vocera & WB ED Vocera      Units: 6 Med Surg Vocera, 8 Med Surg Vocera, & 10 ICU Vocera      After hours Vocera [facility] and After Hours Vocera [place]      Saturday & Sunday (1630 - 0000)     Mon-Fri ([id number])      [facility] Recognized Holidays  ([phone])     Units: ALL   - see above VOCERA links to units and after hours

## 2024-09-28 NOTE — PROGRESS NOTES
Care Management Follow Up    Length of Stay (days): 7  Expected Discharge Date: 10/02/2024  Concerns to be Addressed: discharge planning, home safety     Patient plan of care discussed at interdisciplinary rounds: Yes  Anticipated Discharge Disposition: Transitional Care  Anticipated Discharge Services: Transportation Services  Anticipated Discharge DME: None  Patient/family educated on Medicare website which has current facility and service quality ratings: yes  Education Provided on the Discharge Plan: Yes  Patient/Family in Agreement with the Plan: yes  Referrals Placed by CM/SW: Post-acute referrals    [facility] in [place] (P: [phone])  9/29: Referral placed. Per aden Campos, awaiting clinical review.    [facility] (Ph: [phone], Admissions: [phone], F: [phone])  9/29: Initial referral made.    [facility] (Phone: [phone]/ Fax:  [phone])  9/29: Initial referral made.    [facility] (Ph: [phone], Admissions: [phone], F: [phone])  9/29: Initial referral made.     Private pay costs discussed: Not applicable  Discussed  Partnership in Safe Discharge Planning  document with patient/family: Yes   Handoff Completed: No, handoff not indicated or clinically appropriate  Additional Information: Per Maria Leos Provider, this pt is medically ready for discharge. PT/OT disposition recommendations are for TCU. Pt has preference to go to [facility], but indicated on 9/28 that he is also open to c-TCUs that can meet his needs and are rated high on the Medicare Care Compare tool. Pt weighed on 9/28 at 367, so pt will require stretcher discharge ride & bariatric-capable TCU.     Writer placed initial referrals to TCUs, as listed above. Writer placed requested resources for household organization, moving resources, food delivery resources, Applying for MA, and getting on the elderly waive, and applying for  [facility] programming in AVS discharge instructions. Referral made to [employer] counseling to assist pt with determining if he can qualify for MA.    Next Steps: Care management team will follow closely for discharge to a TCU. Unlikely that pt will be accepted to a TCU over the weekend.    AARON To, LICSW  Weekend Coverage Clinical , [facility]-7B & 7C   Phone: [phone]  Vocera  Social Work & Care Management Department      SEARCHABLE in [facility] - search SOCIAL WORK      [place] (0800 - 1630) Saturday and Sunday      Units: 4A Vocera, 4C Vocera, & 4E Vocera         Units: 5A 7505-9579 Vocera, 5A 6251-8654 Vocera , BMT SW 1 BMT SW 2, BMT SW 3 & BMT SW 4  5C Off Service 5401 - 5416  5C Off Service 6008-0520      Units: 6A Vocera & 6B Vocera       Units: 6C Vocera      Units: 7A Vocera & 7B Vocera       Units: 7C Med Surg 7401 thru 7418 and 7C Med Surg 7502 thru 7521       Unit: [place] ED Vocera & [place] Obs Vocera      [facility] ([phone]) Saturday and Sunday       Units: 5 Ortho Vocera, 5 Med Surg Vocera & WB ED Vocera      Units: 6 Med Surg Vocera, 8 Med Surg Vocera, & 10 ICU Vocera      After hours Vocera [facility] and After Hours Vocera [place]      Saturday & Sunday (1630 - 0000)     Mon-Fri (5189-3623)      [facility] Recognized Holidays  ([phone])     Units: ALL   - see above VOCERA links to units and after hours

## 2024-09-28 NOTE — PROGRESS NOTES
[facility]    Progress Note - Medicine Service, ANGEL LUIS TEAM 3       Date of Admission:  9/22/2024    Assessment & Plan   Clarke Moreira is a 74 year old male with T2DM w/ neuropathy and chronic ulcers, pAF, HFpEF, HLD, MDD, on CPAP and uses walker at home presents after 2 syncopal episodes and admitted for possible sepsis. There is no longer concern for sepsis, refeeding syndrome. Afib is generally rate controlled with exception of transient heart rate rises with activity/exertion.     Today:   - awaiting TCU placement, tentatively [facility]       HFpEF  - restarted PTA torsemide 100 mg daily   - restarted PTA Kcl 40 meq BID   - PTA spironolactone 25 mg daily     New microcytic Anemia- stable  Patient reports last outpatient colonoscopy was <5 years ago and normal. He does not recall where the colonoscopy was performed.  Admit Hgb 11.6, currently 9.8. Iron was 14 and ferritin 28.  - daily hgb check  - c/w ferrous sulfate 325 mg supplementation  - [] consider outpatient colonoscopy and further workup     Pre Syncopal episodes x2 on 9/22  No LOC with falls. These episodes are most likely vasovagal given the context of hypotension and a hot/humid apartment as well as prodrome of light headedness and dizziness.  Cardiac causes of syncope are less likely but still possible given the patient's first episode occurred when raising his arm (coronary steal) and EKG findings of Afib w RVR and nonspecific ST/T wave abnormalities. Neuro causes of syncope less likely given negative head imaging and no evidence of tongue biting though seizures cannot be completely ruled out. Pt pan-scanned orthopaedically to rule out injuries 2/2 syncope without acute findings. OT discharge rec is transitional care facility  - Continue to work with PT on rehab, monitor for further episodes  - Discontinue telemetry as heart rates control has improved     Type 2 diabetes w neuropathy  Chronic  ulcerations  Onychomycosis  - WOC consulted  - HOLD metformin 500 mg daily, okay for sliding scale insulin while hospitalized but could resume metformin as he nears discharge     Paroxysmal atrial fibrillation  Elevated INR  Admit INR 3.12. EKG 9/23 continues to show Afib.   - PTA metoprolol succinate 100 mg daily  - warfarin, pharmacy to dose goal 2-3  - Initiated Metoprolol 5mg IV PRN for Afib sustained RVR, HR>140     BUE Tremors, paroxysmal  Patient had three episodes of afebrile tremors. 11pm 9/22 nurse noted afebrile pale tremors. Around noon 9/23 patient had afebrile tremors w tachycardia; tachycardia rapidly improved (160->104bpm) with deep breathing exercises. Patient stated he was feeling overheated and anxious. 9/24 early morning patient had tremors; improved with application of 3 warm blankets. EKG demonstrated no acute changes ( Afib w RVR, chronic issue). These tremors are most likely related to patient's anxiety given (1) patient reported anxiety and (2) had a rapid response to deep breathing. Rigors related to infection are less likely (given patient's rapid response to deep breathing exercises and improvement in infectious Sx) but still possible.  -Hydroxyzine 50mg q6h ordered     Diffuse Osteopenia   Osteopenia noted on foot and ankle x-ray on admission. Acute or subacute intra-articular fracture of the medial base of the first proximal phalanx.   -Checked Vitamin D levels- normal  - Calcium low-normal (8.5)  [] Follow up outpatient with PCP or Endocrine     Resolved issues and chronic issues:      Passive suicidal ideation  Hx active suicidal ideation, MDD  Frequent psychology visits. On risk assessment at admission, patient denies suicidal intent or plan. Patient says that as he ages, his desire for self-harm decreases as his end would be a natural process due to old age. 9/24 patient endorsed active SI stating he is looking for ways to kill himself. 9/25 patient no longer has active SI and  declined further mental health conversations     Concern for Sepsis, resolved   Patient presented after syncopal episodes with rigors and leukocytosis of 17.3 with leftward ANC shift and Lactate 3.4. UA bland without dysuria. CTAP with cholelithiasis, otherwise unremarkable. No respiratory sxs. MRSA nares negative. Suspect source from skin abrasions vs dental vs bacterimia, however Dental CT demonstrated no signs of abscess. S/p IVF and on empiric abx. ID stopped empiric abx 9/24  - Blood Cx 9/22: NGTD 24 hours.  - Vanc/zosyn started empirically; 9/22-9/24  - ID consulted  - Wound care consulted for LE wounds              - lymphedema consult   - wound cares  - Podiatry consulted: s/p wound cleansing at bedside      #Anion gap metabolic acidosis due to lactic acidosis -resolved     #MICHELLE on CKD 3a, resolved  Admit Cr. 1.51, baseline Cr 1. Likely in setting of sepsis and hypovolemia  - continue hydration as above  - bladder scan prn  - Avoid NSAIDs  - CTM, BMP     Hypokalemia, Hypophosphatemia  Concern for refeeding syndrome -resolved  Current K+ is 3.7 was 3.6 9/22-9/24. Phosphorus currently 2.8 (NL) increased from 2.3 (9/25).  magnesium WNL (2.0). Patient ate for the first time on 9/25 after 2 days of no food. concern refeeding syndrome.   -Initiated phosphorus standard replacement protocol- RN Managed  -CTM     Hypothyroidism  - PTA levothyroxine 175 mcg daily     HLD  - atorvastatin 40 mg     BPH  - PTA tamsulosin 0.4 mg daily     Chronic hypercapnic respiratory failure  - CPAP at night     MDD  CHRIS  Not on pta meds.  -hydroxyzine prn                 Diet: Combination Diet Regular Diet    DVT Prophylaxis: Warfarin  Bazzi Catheter: Not present  Fluids: none  Lines: None     Cardiac Monitoring: None  Code Status: Full Code      Clinically Significant Risk Factors                  # Hypertension: Noted on problem list          # DMII: A1C = 6.5 % (Ref range: <5.7 %) within past 6 months       #  Financial/Environmental Concerns: none         Disposition Plan     Expected Discharge Date: 10/02/2024    Discharge Delays: *Medically Ready for Discharge  Placement - TCU    Discharge Comments: PT rec'ed TCU        The patient's care was discussed with the Attending Physician, Dr. Rangel .    Lc Curtis MD  Medicine Service, [facility] TEAM 3  [facility]  Securely message with [employer] (more info)  Text page via [employer] Paging/Directory   See signed in provider for up to date coverage information  ______________________________________________________________________    Interval History   Reports doing well. Has lymphedema visit with his legs wrapped.  Deciding on TCU facility.    Physical Exam   Vital Signs: Temp: 97.5  F (36.4  C) Temp src: Oral BP: 129/76 Pulse: 56   Resp: 24 SpO2: 100 % O2 Device: None (Room air)    Weight: 0 lbs 0 oz    General: obese,no acute distress  Eyes: no icterus, no conjunctival injection, no papilledema  Cardiac: regular rate/rhythm, no murmurs/rubs/gallops. 1+ pitting edema of bilateral lower legs and feet.   Pulmonary: clear to auscultation, no wheezes/rales  Skin: dry scaly skin over legs bilaterally and ecchymoses over knee. Open small abrasion wounds over toes bilaterally. Lymphedema warps on.  Mental Status: awake and alert    Medical Decision Making       Please see A&P for additional details of medical decision making.      Data     I have personally reviewed the following data over the past 24 hrs:    9.0  \   10.5 (L)   / 346     137 100 16.7 /  116 (H)   4.0 25 0.97 \     INR:  1.68 (H) PTT:  N/A   D-dimer:  N/A Fibrinogen:  N/A       Imaging results reviewed over the past 24 hrs:   No results found for this or any previous visit (from the past 24 hour(s)).

## 2024-09-29 ENCOUNTER — APPOINTMENT (OUTPATIENT)
Dept: OCCUPATIONAL THERAPY | Facility: CLINIC | Age: 74
DRG: 812 | End: 2024-09-29
Payer: COMMERCIAL

## 2024-09-29 LAB
GLUCOSE BLDC GLUCOMTR-MCNC: 130 MG/DL (ref 70–99)
GLUCOSE BLDC GLUCOMTR-MCNC: 138 MG/DL (ref 70–99)
GLUCOSE BLDC GLUCOMTR-MCNC: 139 MG/DL (ref 70–99)
GLUCOSE BLDC GLUCOMTR-MCNC: 222 MG/DL (ref 70–99)
INR PPP: 1.9 (ref 0.85–1.15)
PHOSPHATE SERPL-MCNC: 2.7 MG/DL (ref 2.5–4.5)
PHOSPHATE SERPL-MCNC: 2.9 MG/DL (ref 2.5–4.5)

## 2024-09-29 PROCEDURE — 120N000002 HC R&B MED SURG/OB UMMC

## 2024-09-29 PROCEDURE — 97535 SELF CARE MNGMENT TRAINING: CPT | Mod: GO | Performed by: OCCUPATIONAL THERAPIST

## 2024-09-29 PROCEDURE — 36415 COLL VENOUS BLD VENIPUNCTURE: CPT

## 2024-09-29 PROCEDURE — 85610 PROTHROMBIN TIME: CPT | Performed by: INTERNAL MEDICINE

## 2024-09-29 PROCEDURE — 250N000013 HC RX MED GY IP 250 OP 250 PS 637

## 2024-09-29 PROCEDURE — 84100 ASSAY OF PHOSPHORUS: CPT

## 2024-09-29 PROCEDURE — 250N000013 HC RX MED GY IP 250 OP 250 PS 637: Performed by: STUDENT IN AN ORGANIZED HEALTH CARE EDUCATION/TRAINING PROGRAM

## 2024-09-29 PROCEDURE — 999N000157 HC STATISTIC RCP TIME EA 10 MIN

## 2024-09-29 PROCEDURE — 250N000013 HC RX MED GY IP 250 OP 250 PS 637: Performed by: INTERNAL MEDICINE

## 2024-09-29 PROCEDURE — 99232 SBSQ HOSP IP/OBS MODERATE 35: CPT | Mod: GC | Performed by: STUDENT IN AN ORGANIZED HEALTH CARE EDUCATION/TRAINING PROGRAM

## 2024-09-29 PROCEDURE — 36415 COLL VENOUS BLD VENIPUNCTURE: CPT | Performed by: INTERNAL MEDICINE

## 2024-09-29 RX ORDER — CHLORHEXIDINE GLUCONATE ORAL RINSE 1.2 MG/ML
15 SOLUTION DENTAL 2 TIMES DAILY
Status: DISCONTINUED | OUTPATIENT
Start: 2024-09-29 | End: 2024-10-03 | Stop reason: HOSPADM

## 2024-09-29 RX ORDER — WARFARIN SODIUM 10 MG/1
10 TABLET ORAL
Status: COMPLETED | OUTPATIENT
Start: 2024-09-29 | End: 2024-09-29

## 2024-09-29 RX ADMIN — METOPROLOL SUCCINATE 100 MG: 50 TABLET, EXTENDED RELEASE ORAL at 09:47

## 2024-09-29 RX ADMIN — B-COMPLEX W/ C & FOLIC ACID TAB 1 TABLET: TAB at 09:47

## 2024-09-29 RX ADMIN — SPIRONOLACTONE 50 MG: 50 TABLET ORAL at 09:46

## 2024-09-29 RX ADMIN — CHLORHEXIDINE GLUCONATE 0.12% ORAL RINSE 15 ML: 1.2 LIQUID ORAL at 20:20

## 2024-09-29 RX ADMIN — POTASSIUM CHLORIDE 40 MEQ: 750 TABLET, EXTENDED RELEASE ORAL at 09:47

## 2024-09-29 RX ADMIN — TAMSULOSIN HYDROCHLORIDE 0.4 MG: 0.4 CAPSULE ORAL at 09:47

## 2024-09-29 RX ADMIN — TORSEMIDE 100 MG: 100 TABLET ORAL at 12:29

## 2024-09-29 RX ADMIN — FERROUS SULFATE TAB 325 MG (65 MG ELEMENTAL FE) 325 MG: 325 (65 FE) TAB at 12:28

## 2024-09-29 RX ADMIN — LEVOTHYROXINE SODIUM 175 MCG: 0.05 TABLET ORAL at 09:47

## 2024-09-29 RX ADMIN — POTASSIUM CHLORIDE 40 MEQ: 750 TABLET, EXTENDED RELEASE ORAL at 20:20

## 2024-09-29 RX ADMIN — Medication 1 TABLET: at 12:28

## 2024-09-29 RX ADMIN — ATORVASTATIN CALCIUM 40 MG: 40 TABLET, FILM COATED ORAL at 09:47

## 2024-09-29 RX ADMIN — OXYCODONE HYDROCHLORIDE AND ACETAMINOPHEN 1000 MG: 500 TABLET ORAL at 09:47

## 2024-09-29 RX ADMIN — Medication 25 MCG: at 09:47

## 2024-09-29 RX ADMIN — WARFARIN SODIUM 10 MG: 10 TABLET ORAL at 22:00

## 2024-09-29 ASSESSMENT — ACTIVITIES OF DAILY LIVING (ADL)
ADLS_ACUITY_SCORE: 42
ADLS_ACUITY_SCORE: 42
ADLS_ACUITY_SCORE: 34
ADLS_ACUITY_SCORE: 42
ADLS_ACUITY_SCORE: 34
ADLS_ACUITY_SCORE: 42

## 2024-09-29 NOTE — PROGRESS NOTES
[facility]    Progress Note - Medicine Service, ANGEL LUIS TEAM 3       Date of Admission:  9/22/2024    Assessment & Plan   Clarke Moreira is a 74 year old male with T2DM w/ neuropathy and chronic ulcers, pAF, HFpEF, HLD, MDD, on CPAP and uses walker at home presents after 2 syncopal episodes and admitted for possible sepsis. There is no longer concern for sepsis, refeeding syndrome. Afib is generally rate controlled with exception of transient heart rate rises with activity/exertion.     Today:   - awaiting TCU placement, [facility]  - start chlorhexidine rinse BID      HFpEF  - restarted PTA torsemide 100 mg daily   - restarted PTA Kcl 40 meq BID   - PTA spironolactone 25 mg daily     New microcytic Anemia- stable  Patient reports last outpatient colonoscopy was <5 years ago and normal. He does not recall where the colonoscopy was performed.  Admit Hgb 11.6, currently 9.8. Iron was 14 and ferritin 28.  - daily hgb check  - c/w ferrous sulfate 325 mg supplementation  - [] consider outpatient colonoscopy and further workup     Pre Syncopal episodes x2 on 9/22  No LOC with falls. These episodes are most likely vasovagal given the context of hypotension and a hot/humid apartment as well as prodrome of light headedness and dizziness.  Cardiac causes of syncope are less likely but still possible given the patient's first episode occurred when raising his arm (coronary steal) and EKG findings of Afib w RVR and nonspecific ST/T wave abnormalities. Neuro causes of syncope less likely given negative head imaging and no evidence of tongue biting though seizures cannot be completely ruled out. Pt pan-scanned orthopaedically to rule out injuries 2/2 syncope without acute findings. OT discharge rec is transitional care facility  - Continue to work with PT on rehab, monitor for further episodes  - Discontinue telemetry as heart rates control has improved     Type 2  diabetes w neuropathy  Chronic ulcerations  Onychomycosis  - WOC consulted  - HOLD metformin 500 mg daily, okay for sliding scale insulin while hospitalized but could resume metformin as he nears discharge     Paroxysmal atrial fibrillation  Elevated INR  Admit INR 3.12. EKG 9/23 continues to show Afib.   - PTA metoprolol succinate 100 mg daily  - warfarin, pharmacy to dose goal 2-3  - Initiated Metoprolol 5mg IV PRN for Afib sustained RVR, HR>140     BUE Tremors, paroxysmal  Patient had three episodes of afebrile tremors. 11pm 9/22 nurse noted afebrile pale tremors. Around noon 9/23 patient had afebrile tremors w tachycardia; tachycardia rapidly improved (160->104bpm) with deep breathing exercises. Patient stated he was feeling overheated and anxious. 9/24 early morning patient had tremors; improved with application of 3 warm blankets. EKG demonstrated no acute changes ( Afib w RVR, chronic issue). These tremors are most likely related to patient's anxiety given (1) patient reported anxiety and (2) had a rapid response to deep breathing. Rigors related to infection are less likely (given patient's rapid response to deep breathing exercises and improvement in infectious Sx) but still possible.  -Hydroxyzine 50mg q6h ordered     Diffuse Osteopenia   Osteopenia noted on foot and ankle x-ray on admission. Acute or subacute intra-articular fracture of the medial base of the first proximal phalanx.   -Checked Vitamin D levels- normal  - Calcium low-normal (8.5)  [] Follow up outpatient with PCP or Endocrine    Peridontal disease, chronic  Evaluated by dentistry for source of possible sepsis. Imaging confirmed possible root tip maxillary left second molar (#15). Dentistry recs:  - start chlorhexidine oral rinse BID for 4-8 weeks  - [facility] up with hospital dental [facility] for further evaluation.    Resolved issues and chronic issues:      Passive suicidal ideation  Hx active suicidal ideation, MDD  Frequent  psychology visits. On risk assessment at admission, patient denies suicidal intent or plan. Patient says that as he ages, his desire for self-harm decreases as his end would be a natural process due to old age. 9/24 patient endorsed active SI stating he is looking for ways to kill himself. 9/25 patient no longer has active SI and declined further mental health conversations     Concern for Sepsis, resolved   Patient presented after syncopal episodes with rigors and leukocytosis of 17.3 with leftward ANC shift and Lactate 3.4. UA bland without dysuria. CTAP with cholelithiasis, otherwise unremarkable. No respiratory sxs. MRSA nares negative. Suspect source from skin abrasions vs dental vs bacterimia, however Dental CT demonstrated no signs of abscess. S/p IVF and on empiric abx. ID stopped empiric abx 9/24  - Blood Cx 9/22: NGTD 24 hours.  - Vanc/zosyn started empirically; 9/22-9/24  - ID consulted  - Wound care consulted for LE wounds              - lymphedema consult   - wound cares  - Podiatry consulted: s/p wound cleansing at bedside      #Anion gap metabolic acidosis due to lactic acidosis -resolved     #MICHELLE on CKD 3a, resolved  Admit Cr. 1.51, baseline Cr 1. Likely in setting of sepsis and hypovolemia  - continue hydration as above  - bladder scan prn  - Avoid NSAIDs  - CTM, BMP      Hypokalemia, Hypophosphatemia  Concern for refeeding syndrome -resolved  Current K+ is 3.7 was 3.6 9/22-9/24. Phosphorus currently 2.8 (NL) increased from 2.3 (9/25).  magnesium WNL (2.0). Patient ate for the first time on 9/25 after 2 days of no food. concern refeeding syndrome.   -Initiated phosphorus standard replacement protocol- RN Managed  -CTM     Hypothyroidism  - PTA levothyroxine 175 mcg daily     HLD  - atorvastatin 40 mg     BPH  - PTA tamsulosin 0.4 mg daily     Chronic hypercapnic respiratory failure  - CPAP at night     MDD  CHRIS  Not on pta meds.  -hydroxyzine prn             Diet: Combination Diet Regular Diet     DVT Prophylaxis: Warfarin  Bazzi Catheter: Not present  Fluids: none  Lines: None     Cardiac Monitoring: None  Code Status: Full Code      Clinically Significant Risk Factors                  # Hypertension: Noted on problem list          # DMII: A1C = 6.5 % (Ref range: <5.7 %) within past 6 months         # Financial/Environmental Concerns: none         Disposition Plan      Expected Discharge Date: 10/02/2024    Discharge Delays: *Medically Ready for Discharge  Placement - TCU    Discharge Comments: PT rec'ed TCU, awaitiing TCU placement        The patient's care was discussed with the Attending Physician, Dr. Rockwell .    Lc Curtis MD  Medicine Service, [address]  Securely message with [employer] (more info)  Text page via [employer] Paging/Directory   See signed in provider for up to date coverage information  ______________________________________________________________________    Interval History   NAEON. Reports tenderness over the left lateral paraspinal muscle area that occurred after he used bedpan today. Managing with heat pad and lidocaine .    Physical Exam   Vital Signs: Temp: 97.9  F (36.6  C) Temp src: Oral BP: 133/65 Pulse: 84   Resp: 16 SpO2: 97 % O2 Device: None (Room air)    Weight: 367 lbs 11.64 oz    General: obese, no acute distress  Eyes: no icterus, no conjunctival injection, no papilledema  Cardiac: regular rate/rhythm, no murmurs/rubs/gallops. 1+ pitting edema of bilateral lower legs and feet.   Pulmonary: clear to auscultation, no wheezes/rales  Skin: dry scaly skin over legs bilaterally and ecchymoses over knee. Open small abrasion wounds over toes bilaterally. Lymphedema warps on.  Mental Status: awake and alert    Medical Decision Making       Please see A&P for additional details of medical decision making.      Data     I have personally reviewed the following data over the past 24 hrs:    INR:  1.90 (H) PTT:  N/A    D-dimer:  N/A Fibrinogen:  N/A       Imaging results reviewed over the past 24 hrs:   No results found for this or any previous visit (from the past 24 hour(s)).

## 2024-09-29 NOTE — PLAN OF CARE
Shift - NOC 8691-1856    VS - Q8   Orientation - A&Ox4  Activity - Not OOB this shift; Lift   Lines & Drains - PIV SL   GI/ - Voids spontaneously into urinal   Skin & wounds - Bilateral knee wounds    Updates or issues this shift - No issues or updates this shift   Plan - discharge to TCU when bed available     Goal Outcome Evaluation:      Plan of Care Reviewed With: patient    Overall Patient Progress: improvingOverall Patient Progress: improving

## 2024-09-29 NOTE — PLAN OF CARE
Goal Outcome Evaluation:      Plan of Care Reviewed With: patient      VSS on RA. A&Ox4. Pt had no c/o pain, N/V/D throughout shift. Uses urinal at bedside with significant output (see flowsheets). Uses call bell appropriately. Lymphedema wraps on. R PIV SL. No BM on shift. Plan to discharge to TCU.

## 2024-09-29 NOTE — PLAN OF CARE
Goal Outcome Evaluation:    Plan of Care Reviewed With: patient    Overall Patient Progress: no change    Outcome Evaluation: C/o new left side lower back pain this AM radiating from hip down to left lower leg, pt declines tylenol, improved w/ repositioning- says he feels that it is muscle related, intermittently using heating pad, new order for voltaren gel. Bilateral knee dressings CDI. Large soft BM x1 via bedpan, declined AM senna. Good UOP, using urinal. Good appetite on regular diet, BGs stable. Starting BID oral chlorhexidine rinses tonight. Not OOB this shift & not seen by PT but pt states he is working on PT exercises in bed. Plan for TCU discharge this week.       Male

## 2024-09-29 NOTE — PROGRESS NOTES
Pt refused Cpap multiple nights. RT discontinue Cpap order.  Rt will continue to follow and monitor    RT Rafael

## 2024-09-29 NOTE — PROGRESS NOTES
Dental Consult: Sepsis of Unclear Source     Referring MD & Reason for Visit: [facility] Dental Service was asked by Nikhil Barahona MD, to see Clarke Moreira for a COMPREHENSIVE oral assessment regarding sepsis unclear source, history of dental caries being prior source of sepsis.     HPI:  This patient is a 74 year old male with a history of T2DM w/ neuropathy and chronic ulcers, pAF, HFpEF, HLD, MDD, on CPAP and uses walker at home presents after 2 syncopal episodes and admitted for sepsis 2/2 GNR (Citrobacter) bacteremia, now on IV antibiotics .     Dental History and Findings:  Dental and oropharyngeal history is pertinent for prior hx of sepsis w/ dental caries source.  The patient reports he fell and chipped a front tooth when he fainted. Has not had a regular dentist in several years.       The patient has OHAT score of 7 and an OHIP score of 6 which would indicate less than optimal oral health. Poor oral hygiene makes the oral cavity a possible source of sepsis.    CT DENTAL WO CONTRAST 9/23/2024 5:07 PM   History:  eval for dental carries or abscess for possible source of  sepsis     Comparison:  CT head same day, 9/22/2024       TECHNIQUE: 3-D reconstruction by the technologists, with curved  multiplanar reformat of thin section imaging through the mandible and  maxilla obtained without intravenous contrast.     FINDINGS:  No significant soft tissue swelling or mass. Normal facial bone  alignment. No bony erosion. Scattered dental hardware with streak  artifact, partially limiting evaluation of the teeth. No significant  periapical lucencies. Few scattered dental caries, most pronounced in  the right mandibular cuspid and right maxillary lateral incisor.  Fractured versus surgically extracted left posterior maxillary molar  with residual root present.     Polypoid mucosal thickening in the left maxillary sinus. The remainder  of the visualized portions of the paranasal sinuses are clear.  Visualized  mastoid air cells are clear. Normal temporomandibular  joints.                                                                      IMPRESSION:  1. No periapical abscess identified.  2. Extensive dental hardware with streak artifact partially limits  evaluation of the teeth. Scattered dental caries present, most  pronounced in the right mandibular cuspid. Fractured versus surgically  extracted left posterior maxillary molar with residual root present.     I have personally reviewed the examination and initial interpretation  and I agree with the findings.     GREY CROWE MD      Dental Review of the Dental CT:  No obvious periapical findings on CT, confirmed by radiologist. I did find a possible root tip maxillary left second molar (#15). Also the CT showed evidence of mild chronic periodontitis but no evidence of acute infection.    Assessment:  Oral exam concerning for sepsis unclear source w/ history of sepsis w/ dental caries as source. Suspicious for areas of caries t/o dentition, no dental home. Unlikely but possible oral source of sepsis.    Dental Service Recommendations:  Patient should be seen in the hospital dental clinic in [facility] for further evaluation.  Consider chlorhexidine oral rinse twice daily for 4-8 weeks  Follow dental hygiene oral care plan daily during hospital stay.    Manas Jeong DDS  Staff Attending Dentist    Dental

## 2024-09-30 ENCOUNTER — APPOINTMENT (OUTPATIENT)
Dept: OCCUPATIONAL THERAPY | Facility: CLINIC | Age: 74
DRG: 812 | End: 2024-09-30
Payer: COMMERCIAL

## 2024-09-30 ENCOUNTER — APPOINTMENT (OUTPATIENT)
Dept: PHYSICAL THERAPY | Facility: CLINIC | Age: 74
DRG: 812 | End: 2024-09-30
Payer: COMMERCIAL

## 2024-09-30 LAB
ANION GAP SERPL CALCULATED.3IONS-SCNC: 12 MMOL/L (ref 7–15)
BUN SERPL-MCNC: 20.1 MG/DL (ref 8–23)
CALCIUM SERPL-MCNC: 9.2 MG/DL (ref 8.8–10.4)
CHLORIDE SERPL-SCNC: 100 MMOL/L (ref 98–107)
CREAT SERPL-MCNC: 0.98 MG/DL (ref 0.67–1.17)
EGFRCR SERPLBLD CKD-EPI 2021: 81 ML/MIN/1.73M2
ERYTHROCYTE [DISTWIDTH] IN BLOOD BY AUTOMATED COUNT: 15.6 % (ref 10–15)
GLUCOSE BLDC GLUCOMTR-MCNC: 129 MG/DL (ref 70–99)
GLUCOSE BLDC GLUCOMTR-MCNC: 139 MG/DL (ref 70–99)
GLUCOSE SERPL-MCNC: 133 MG/DL (ref 70–99)
HCO3 SERPL-SCNC: 25 MMOL/L (ref 22–29)
HCT VFR BLD AUTO: 36 % (ref 40–53)
HGB BLD-MCNC: 10.6 G/DL (ref 13.3–17.7)
HOLD SPECIMEN: NORMAL
INR PPP: 1.86 (ref 0.85–1.15)
MCH RBC QN AUTO: 22.9 PG (ref 26.5–33)
MCHC RBC AUTO-ENTMCNC: 29.4 G/DL (ref 31.5–36.5)
MCV RBC AUTO: 78 FL (ref 78–100)
PHOSPHATE SERPL-MCNC: 3.2 MG/DL (ref 2.5–4.5)
PHOSPHATE SERPL-MCNC: 3.3 MG/DL (ref 2.5–4.5)
PLATELET # BLD AUTO: 433 10E3/UL (ref 150–450)
POTASSIUM SERPL-SCNC: 4.2 MMOL/L (ref 3.4–5.3)
RBC # BLD AUTO: 4.63 10E6/UL (ref 4.4–5.9)
SODIUM SERPL-SCNC: 137 MMOL/L (ref 135–145)
WBC # BLD AUTO: 9 10E3/UL (ref 4–11)

## 2024-09-30 PROCEDURE — 97530 THERAPEUTIC ACTIVITIES: CPT | Mod: GO | Performed by: OCCUPATIONAL THERAPIST

## 2024-09-30 PROCEDURE — 97530 THERAPEUTIC ACTIVITIES: CPT | Mod: GP

## 2024-09-30 PROCEDURE — 250N000013 HC RX MED GY IP 250 OP 250 PS 637

## 2024-09-30 PROCEDURE — 85610 PROTHROMBIN TIME: CPT

## 2024-09-30 PROCEDURE — 99233 SBSQ HOSP IP/OBS HIGH 50: CPT | Mod: GC | Performed by: INTERNAL MEDICINE

## 2024-09-30 PROCEDURE — 85027 COMPLETE CBC AUTOMATED: CPT

## 2024-09-30 PROCEDURE — 120N000002 HC R&B MED SURG/OB UMMC

## 2024-09-30 PROCEDURE — 84100 ASSAY OF PHOSPHORUS: CPT

## 2024-09-30 PROCEDURE — 80048 BASIC METABOLIC PNL TOTAL CA: CPT

## 2024-09-30 PROCEDURE — 250N000013 HC RX MED GY IP 250 OP 250 PS 637: Performed by: INTERNAL MEDICINE

## 2024-09-30 PROCEDURE — 250N000013 HC RX MED GY IP 250 OP 250 PS 637: Performed by: STUDENT IN AN ORGANIZED HEALTH CARE EDUCATION/TRAINING PROGRAM

## 2024-09-30 PROCEDURE — 36415 COLL VENOUS BLD VENIPUNCTURE: CPT

## 2024-09-30 RX ADMIN — OXYCODONE HYDROCHLORIDE AND ACETAMINOPHEN 1000 MG: 500 TABLET ORAL at 09:28

## 2024-09-30 RX ADMIN — ATORVASTATIN CALCIUM 40 MG: 40 TABLET, FILM COATED ORAL at 09:26

## 2024-09-30 RX ADMIN — Medication 25 MCG: at 09:29

## 2024-09-30 RX ADMIN — Medication 1 TABLET: at 11:43

## 2024-09-30 RX ADMIN — TORSEMIDE 100 MG: 100 TABLET ORAL at 11:43

## 2024-09-30 RX ADMIN — LEVOTHYROXINE SODIUM 175 MCG: 0.05 TABLET ORAL at 09:27

## 2024-09-30 RX ADMIN — CHLORHEXIDINE GLUCONATE 0.12% ORAL RINSE 15 ML: 1.2 LIQUID ORAL at 09:29

## 2024-09-30 RX ADMIN — B-COMPLEX W/ C & FOLIC ACID TAB 1 TABLET: TAB at 09:29

## 2024-09-30 RX ADMIN — POTASSIUM CHLORIDE 40 MEQ: 750 TABLET, EXTENDED RELEASE ORAL at 19:54

## 2024-09-30 RX ADMIN — SPIRONOLACTONE 50 MG: 50 TABLET ORAL at 09:28

## 2024-09-30 RX ADMIN — METFORMIN HYDROCHLORIDE 500 MG: 500 TABLET, FILM COATED ORAL at 09:28

## 2024-09-30 RX ADMIN — METFORMIN HYDROCHLORIDE 500 MG: 500 TABLET, FILM COATED ORAL at 18:21

## 2024-09-30 RX ADMIN — TAMSULOSIN HYDROCHLORIDE 0.4 MG: 0.4 CAPSULE ORAL at 09:29

## 2024-09-30 RX ADMIN — WARFARIN SODIUM 14 MG: 10 TABLET ORAL at 18:22

## 2024-09-30 RX ADMIN — CHLORHEXIDINE GLUCONATE 0.12% ORAL RINSE 15 ML: 1.2 LIQUID ORAL at 19:54

## 2024-09-30 RX ADMIN — POTASSIUM CHLORIDE 40 MEQ: 750 TABLET, EXTENDED RELEASE ORAL at 09:26

## 2024-09-30 RX ADMIN — SENNOSIDES AND DOCUSATE SODIUM 1 TABLET: 8.6; 5 TABLET ORAL at 19:54

## 2024-09-30 RX ADMIN — METOPROLOL SUCCINATE 100 MG: 50 TABLET, EXTENDED RELEASE ORAL at 09:27

## 2024-09-30 RX ADMIN — DICLOFENAC SODIUM 2 G: 10 GEL TOPICAL at 11:23

## 2024-09-30 ASSESSMENT — ACTIVITIES OF DAILY LIVING (ADL)
ADLS_ACUITY_SCORE: 46
ADLS_ACUITY_SCORE: 42
ADLS_ACUITY_SCORE: 46
ADLS_ACUITY_SCORE: 46
ADLS_ACUITY_SCORE: 42
ADLS_ACUITY_SCORE: 42
ADLS_ACUITY_SCORE: 46
ADLS_ACUITY_SCORE: 46
ADLS_ACUITY_SCORE: 42
ADLS_ACUITY_SCORE: 46
ADLS_ACUITY_SCORE: 46
ADLS_ACUITY_SCORE: 42
ADLS_ACUITY_SCORE: 46
ADLS_ACUITY_SCORE: 42
ADLS_ACUITY_SCORE: 46
ADLS_ACUITY_SCORE: 42
ADLS_ACUITY_SCORE: 46
ADLS_ACUITY_SCORE: 42
ADLS_ACUITY_SCORE: 42

## 2024-09-30 NOTE — PLAN OF CARE
Goal Outcome Evaluation: [facility] [id number]. Pt A&Ox4, VSS on RA, and able to make needs known. C/o pain in lower back radiating down into leg and hip. Pt declined tylenol, but being managed with a heating pad and repositioning which is giving relief. Bilateral knee dressings CDI. Having good UOP with the urinal. No BM this shift. Awaiting TCU placement. Continue with plan of care.       Plan of Care Reviewed With: patient    Overall Patient Progress: no changeOverall Patient Progress: no change

## 2024-09-30 NOTE — PROGRESS NOTES
Care Management Follow Up    Length of Stay (days): 8    Expected Discharge Date: 10/03/2024     Concerns to be Addressed: discharge planning, home safety     Patient plan of care discussed at interdisciplinary rounds: Yes    Anticipated Discharge Disposition: Transitional Care  Anticipated Discharge Services: Transportation Services  Anticipated Discharge DME: None    Patient/family educated on Medicare website which has current facility and service quality ratings: yes  Education Provided on the Discharge Plan: Yes  Patient/Family in Agreement with the Plan: yes    Referrals Placed by CM/SW:      [facility]  [address] [facility] [address]  P: [phone]    [facility]  [address] [place] [address]  [facility] Referral  Liaison: Kayla Price  P: [phone]  F: [phone]    [facility]: [facility] not [facility]   [facility]: Bed not available   [facility]: Bariatric needs    Private pay costs discussed: Not applicable    Discussed  Partnership in Safe Discharge Planning  document with patient/family: Yes     Handoff Completed: Will be completed at the time of discharge    Additional Information:  MERVAT received a call from [facility] liaison Pati Norwood. She expressed concerns regarding the patient's ability to participate in therapies due to the patient's high heart rate. Pati is also concerned about the patient's rehabilitation potential. She is hoping the team can address the patient's heart rate. She will continue to follow.     Next Steps: MERVAT will plan to follow up on remaining TCU referral tomorrow and get additional TCU choices as needed. MERVAT will continue to follow for discharge needs and placement     YAAKOV Esquivel  [address]   Office: [phone]  [email or web address]  Securely message with [employer] (Search Name or 7C Med Surg [id number] MERVAT)  Text page via [facility] Paging/Directory   See  signed in provider for up to date coverage information  Social Work & Care Management Department

## 2024-09-30 NOTE — PLAN OF CARE
Goal Outcome Evaluation:      Plan of Care Reviewed With: patient  Overall Patient Progress: no change  Overall Patient Progress: no change       Assumed care [id number]     Events this shift: RACHEL. Remains in afib with RVR. Visitor at bedside. Poor appetite.

## 2024-09-30 NOTE — PROGRESS NOTES
[facility]    Progress Note - Medicine Service, ANGEL LUIS TEAM 3       Date of Admission:  9/22/2024    Assessment & Plan   Clarke Moreira is a 74 year old male with T2DM w/ neuropathy and chronic ulcers, pAF, HFpEF, HLD, MDD, on CPAP and uses walker at home presents after 2 syncopal episodes and admitted for possible sepsis. There is no longer concern for sepsis, refeeding syndrome. Afib is generally rate controlled with exception of transient heart rate rises with activity/exertion.     Today:   - PT and OT limited by wide range of HR () but recovers quickly  - restarted tele  - will discuss c going ards consult tomorrow after monitoring on tele  - awaiting TCU placement, tentatively [facility]      Paroxysmal atrial fibrillation  Elevated INR  Admit INR 3.12. EKG 9/23 continues to show Afib.  Noted to have sweating and heart rates between bradycardia to tachycardia with physical and Occupational Therapy.  - PTA metoprolol succinate 100 mg daily  - warfarin, pharmacy to dose goal 2-3  - Metoprolol 5mg IV PRN for Afib sustained RVR, HR>140  - Restarted telemetry  - will discuss cards consult tomorrow after monitoring on tele overnight    HFpEF  - restarted PTA torsemide 100 mg daily   - restarted PTA Kcl 40 meq BID   - PTA spironolactone 25 mg daily     New microcytic Anemia- stable  Patient reports last outpatient colonoscopy was <5 years ago and normal. He does not recall where the colonoscopy was performed.  Admit Hgb 11.6, currently 9.8. Iron was 14 and ferritin 28.  - daily hgb check  - c/w ferrous sulfate 325 mg supplementation  - [] consider outpatient colonoscopy and further workup     Pre Syncopal episodes x2 on 9/22  No LOC with falls. These episodes are most likely vasovagal given the context of hypotension and a hot/humid apartment as well as prodrome of light headedness and dizziness.  Cardiac causes of syncope are less likely but still possible  given the patient's first episode occurred when raising his arm (coronary steal) and EKG findings of Afib w RVR and nonspecific ST/T wave abnormalities. Neuro causes of syncope less likely given negative head imaging and no evidence of tongue biting though seizures cannot be completely ruled out. Pt pan-scanned orthopaedically to rule out injuries 2/2 syncope without acute findings. OT discharge rec is transitional care facility  - Continue to work with PT on rehab, monitor for further episodes  - Discontinue telemetry as heart rates control has improved     Type 2 diabetes w neuropathy  Chronic ulcerations  Onychomycosis  - WOC consulted  - HOLD metformin 500 mg daily, okay for sliding scale insulin while hospitalized but could resume metformin as he nears discharge     BUE Tremors, paroxysmal  Patient had three episodes of afebrile tremors. 11pm 9/22 nurse noted afebrile pale tremors. Around noon 9/23 patient had afebrile tremors w tachycardia; tachycardia rapidly improved (160->104bpm) with deep breathing exercises. Patient stated he was feeling overheated and anxious. 9/24 early morning patient had tremors; improved with application of 3 warm blankets. EKG demonstrated no acute changes ( Afib w RVR, chronic issue). These tremors are most likely related to patient's anxiety given (1) patient reported anxiety and (2) had a rapid response to deep breathing. Rigors related to infection are less likely (given patient's rapid response to deep breathing exercises and improvement in infectious Sx) but still possible.  -Hydroxyzine 50mg q6h ordered     Diffuse Osteopenia   Osteopenia noted on foot and ankle x-ray on admission. Acute or subacute intra-articular fracture of the medial base of the first proximal phalanx.   -Checked Vitamin D levels- normal  - Calcium low-normal (8.5)  [] Follow up outpatient with PCP or Endocrine      Deconditioning  -Ongoing PT and OT     Peridontal disease, chronic  Evaluated by dentistry  for source of possible sepsis. Imaging confirmed possible root tip maxillary left second molar (#15). Dentistry recs:  - start chlorhexidine oral rinse BID for 4-8 weeks  - follow up with hospital dental clinic in [facility] for further evaluation.     Resolved issues and chronic issues:      Passive suicidal ideation  Hx active suicidal ideation, MDD  Frequent psychology visits. On risk assessment at admission, patient denies suicidal intent or plan. Patient says that as he ages, his desire for self-harm decreases as his end would be a natural process due to old age. 9/24 patient endorsed active SI stating he is looking for ways to kill himself. 9/25 patient no longer has active SI and declined further mental health conversations     Concern for Sepsis, resolved   Patient presented after syncopal episodes with rigors and leukocytosis of 17.3 with leftward ANC shift and Lactate 3.4. UA bland without dysuria. CTAP with cholelithiasis, otherwise unremarkable. No respiratory sxs. MRSA nares negative. Suspect source from skin abrasions vs dental vs bacterimia, however Dental CT demonstrated no signs of abscess. S/p IVF and on empiric abx. ID stopped empiric abx 9/24  - Blood Cx 9/22: NGTD 24 hours.  - Vanc/zosyn started empirically; 9/22-9/24  - ID consulted  - Wound care consulted for LE wounds              - lymphedema consult   - wound cares  - Podiatry consulted: s/p wound cleansing at bedside      #Anion gap metabolic acidosis due to lactic acidosis -resolved     #MICHELLE on CKD 3a, resolved  Admit Cr. 1.51, baseline Cr 1. Likely in setting of sepsis and hypovolemia  - continue hydration as above  - bladder scan prn  - Avoid NSAIDs  - CTM, BMP      Hypokalemia, Hypophosphatemia  Concern for refeeding syndrome -resolved  Current K+ is 3.7 was 3.6 9/22-9/24. Phosphorus currently 2.8 (NL) increased from 2.3 (9/25).  magnesium WNL (2.0). Patient ate for the first time on 9/25 after 2 days of no food. concern refeeding  syndrome.   -Initiated phosphorus standard replacement protocol- RN Managed  -CTM     Hypothyroidism  - PTA levothyroxine 175 mcg daily     HLD  - atorvastatin 40 mg     BPH  - PTA tamsulosin 0.4 mg daily     Chronic hypercapnic respiratory failure  - CPAP at night     MDD  CHRIS  Not on pta meds.  -hydroxyzine prn           Diet: Combination Diet Regular Diet    DVT Prophylaxis: Warfarin  Bazzi Catheter: Not present  Fluids: none  Lines: None     Cardiac Monitoring: ACTIVE order. Indication: Tachyarrhythmias, acute (48 hours)  Code Status: Full Code      Clinically Significant Risk Factors                  # Hypertension: Noted on problem list          # DMII: A1C = 6.5 % (Ref range: <5.7 %) within past 6 months       # Financial/Environmental Concerns: none         Disposition Plan      Expected Discharge Date: 10/03/2024    Discharge Delays: *Medically Ready for Discharge  Placement - TCU    Discharge Comments: PT rec'ed TCU, awaitiing TCU placement        The patient's care was discussed with the Attending Physician, Dr. Pickard .    Lc Curtis MD  Medicine Service, [address]  Securely message with [employer] (more info)  Text page via [facility] Paging/Directory   See signed in provider for up to date coverage information  ______________________________________________________________________    Interval History   No acute event overnight.  This morning patient was frustrated for needing nursing care.   Had PT and OT sessions that were limited by swinging HR. pulse was measured with oximeter.      Physical Exam   Vital Signs: Temp: 98.4  F (36.9  C) Temp src: Oral BP: 124/53 Pulse: 101   Resp: 16 SpO2: 99 % O2 Device: None (Room air)    Weight: 367 lbs 11.64 oz    General: obese, no acute distress  Eyes: no icterus, no conjunctival injection, no papilledema  Cardiac: irregular rate/rhythm, no murmurs/rubs/gallops. 1+ pitting edema of bilateral lower legs  and feet.   Pulmonary: clear to auscultation, no wheezes/rales  Skin: dry scaly skin over legs bilaterally and ecchymoses over knee. Open small abrasion wounds over toes bilaterally  Mental Status: awake and alert    Medical Decision Making       Please see A&P for additional details of medical decision making.      Data     I have personally reviewed the following data over the past 24 hrs:    9.0  \   10.6 (L)   / 433     137 100 20.1 /  139 (H)   4.2 25 0.98 \     INR:  1.86 (H) PTT:  N/A   D-dimer:  N/A Fibrinogen:  N/A       Imaging results reviewed over the past 24 hrs:   No results found for this or any previous visit (from the past 24 hour(s)).

## 2024-09-30 NOTE — PLAN OF CARE
Blood pressure 126/63, pulse 93, temperature 98.4  F (36.9  C), temperature source Oral, resp. rate 16, weight (!) 166.8 kg (367 lb 11.6 oz), SpO2 98%.  Goal Outcome Evaluation:    Plan of Care Reviewed with :Patient   Overall Patient prognosis:No change  Pt.is A&Ox4,up with assist of 2 and lift,had x 2 Bm and voided adequately,LS diminished HR irregular pt is in A fib,BS+,bilateral knee foam dressings changed,c/o left hip back leg pain ,Voltaren gel applied.Plan to discharge to TCU.Will continue to monitor.

## 2024-10-01 ENCOUNTER — APPOINTMENT (OUTPATIENT)
Dept: PHYSICAL THERAPY | Facility: CLINIC | Age: 74
DRG: 812 | End: 2024-10-01
Payer: COMMERCIAL

## 2024-10-01 LAB
GLUCOSE BLDC GLUCOMTR-MCNC: 113 MG/DL (ref 70–99)
GLUCOSE BLDC GLUCOMTR-MCNC: 117 MG/DL (ref 70–99)
GLUCOSE BLDC GLUCOMTR-MCNC: 144 MG/DL (ref 70–99)
HOLD SPECIMEN: NORMAL
INR PPP: 2.48 (ref 0.85–1.15)
PHOSPHATE SERPL-MCNC: 3.4 MG/DL (ref 2.5–4.5)

## 2024-10-01 PROCEDURE — 85610 PROTHROMBIN TIME: CPT | Performed by: INTERNAL MEDICINE

## 2024-10-01 PROCEDURE — 250N000013 HC RX MED GY IP 250 OP 250 PS 637: Performed by: STUDENT IN AN ORGANIZED HEALTH CARE EDUCATION/TRAINING PROGRAM

## 2024-10-01 PROCEDURE — 84100 ASSAY OF PHOSPHORUS: CPT

## 2024-10-01 PROCEDURE — 250N000013 HC RX MED GY IP 250 OP 250 PS 637

## 2024-10-01 PROCEDURE — 120N000002 HC R&B MED SURG/OB UMMC

## 2024-10-01 PROCEDURE — 250N000013 HC RX MED GY IP 250 OP 250 PS 637: Performed by: INTERNAL MEDICINE

## 2024-10-01 PROCEDURE — 36415 COLL VENOUS BLD VENIPUNCTURE: CPT

## 2024-10-01 PROCEDURE — 36415 COLL VENOUS BLD VENIPUNCTURE: CPT | Performed by: INTERNAL MEDICINE

## 2024-10-01 PROCEDURE — 85610 PROTHROMBIN TIME: CPT

## 2024-10-01 PROCEDURE — 97530 THERAPEUTIC ACTIVITIES: CPT | Mod: GP

## 2024-10-01 PROCEDURE — 250N000009 HC RX 250

## 2024-10-01 PROCEDURE — 99233 SBSQ HOSP IP/OBS HIGH 50: CPT | Performed by: INTERNAL MEDICINE

## 2024-10-01 PROCEDURE — G0463 HOSPITAL OUTPT CLINIC VISIT: HCPCS

## 2024-10-01 RX ORDER — WARFARIN SODIUM 10 MG/1
10 TABLET ORAL
Status: COMPLETED | OUTPATIENT
Start: 2024-10-01 | End: 2024-10-01

## 2024-10-01 RX ORDER — METOPROLOL SUCCINATE 50 MG/1
150 TABLET, EXTENDED RELEASE ORAL DAILY
Status: DISCONTINUED | OUTPATIENT
Start: 2024-10-02 | End: 2024-10-03 | Stop reason: HOSPADM

## 2024-10-01 RX ORDER — METOPROLOL SUCCINATE 50 MG/1
50 TABLET, EXTENDED RELEASE ORAL ONCE
Status: COMPLETED | OUTPATIENT
Start: 2024-10-01 | End: 2024-10-01

## 2024-10-01 RX ORDER — METOPROLOL TARTRATE 1 MG/ML
5 INJECTION, SOLUTION INTRAVENOUS EVERY 5 MIN PRN
Status: DISCONTINUED | OUTPATIENT
Start: 2024-10-01 | End: 2024-10-02

## 2024-10-01 RX ADMIN — METOPROLOL TARTRATE 5 MG: 5 INJECTION INTRAVENOUS at 08:29

## 2024-10-01 RX ADMIN — B-COMPLEX W/ C & FOLIC ACID TAB 1 TABLET: TAB at 08:50

## 2024-10-01 RX ADMIN — SPIRONOLACTONE 50 MG: 50 TABLET ORAL at 08:50

## 2024-10-01 RX ADMIN — METOPROLOL TARTRATE 5 MG: 5 INJECTION INTRAVENOUS at 09:50

## 2024-10-01 RX ADMIN — FERROUS SULFATE TAB 325 MG (65 MG ELEMENTAL FE) 325 MG: 325 (65 FE) TAB at 14:04

## 2024-10-01 RX ADMIN — METOPROLOL SUCCINATE 50 MG: 50 TABLET, EXTENDED RELEASE ORAL at 10:43

## 2024-10-01 RX ADMIN — Medication 25 MCG: at 08:49

## 2024-10-01 RX ADMIN — METFORMIN HYDROCHLORIDE 500 MG: 500 TABLET, FILM COATED ORAL at 08:50

## 2024-10-01 RX ADMIN — SENNOSIDES AND DOCUSATE SODIUM 2 TABLET: 8.6; 5 TABLET ORAL at 20:17

## 2024-10-01 RX ADMIN — POTASSIUM CHLORIDE 40 MEQ: 750 TABLET, EXTENDED RELEASE ORAL at 08:49

## 2024-10-01 RX ADMIN — Medication 1 TABLET: at 14:04

## 2024-10-01 RX ADMIN — TAMSULOSIN HYDROCHLORIDE 0.4 MG: 0.4 CAPSULE ORAL at 08:50

## 2024-10-01 RX ADMIN — LEVOTHYROXINE SODIUM 175 MCG: 0.05 TABLET ORAL at 08:50

## 2024-10-01 RX ADMIN — WARFARIN SODIUM 10 MG: 10 TABLET ORAL at 18:22

## 2024-10-01 RX ADMIN — METOPROLOL SUCCINATE 100 MG: 50 TABLET, EXTENDED RELEASE ORAL at 08:50

## 2024-10-01 RX ADMIN — CHLORHEXIDINE GLUCONATE 0.12% ORAL RINSE 15 ML: 1.2 LIQUID ORAL at 10:43

## 2024-10-01 RX ADMIN — TORSEMIDE 100 MG: 100 TABLET ORAL at 14:04

## 2024-10-01 RX ADMIN — POTASSIUM CHLORIDE 40 MEQ: 750 TABLET, EXTENDED RELEASE ORAL at 20:18

## 2024-10-01 RX ADMIN — UREA: 8.5 CREAM TOPICAL at 14:09

## 2024-10-01 RX ADMIN — CHLORHEXIDINE GLUCONATE 0.12% ORAL RINSE 15 ML: 1.2 LIQUID ORAL at 20:18

## 2024-10-01 RX ADMIN — ATORVASTATIN CALCIUM 40 MG: 40 TABLET, FILM COATED ORAL at 08:49

## 2024-10-01 RX ADMIN — METFORMIN HYDROCHLORIDE 500 MG: 500 TABLET, FILM COATED ORAL at 18:22

## 2024-10-01 RX ADMIN — OXYCODONE HYDROCHLORIDE AND ACETAMINOPHEN 1000 MG: 500 TABLET ORAL at 08:50

## 2024-10-01 ASSESSMENT — ACTIVITIES OF DAILY LIVING (ADL)
ADLS_ACUITY_SCORE: 46
ADLS_ACUITY_SCORE: 38
ADLS_ACUITY_SCORE: 46
ADLS_ACUITY_SCORE: 42
ADLS_ACUITY_SCORE: 42
ADLS_ACUITY_SCORE: 46
ADLS_ACUITY_SCORE: 38
ADLS_ACUITY_SCORE: 38
ADLS_ACUITY_SCORE: 42
ADLS_ACUITY_SCORE: 46
ADLS_ACUITY_SCORE: 42
ADLS_ACUITY_SCORE: 46
ADLS_ACUITY_SCORE: 38
ADLS_ACUITY_SCORE: 46
ADLS_ACUITY_SCORE: 46
ADLS_ACUITY_SCORE: 42
ADLS_ACUITY_SCORE: 46
ADLS_ACUITY_SCORE: 38
ADLS_ACUITY_SCORE: 42
ADLS_ACUITY_SCORE: 46
ADLS_ACUITY_SCORE: 38
ADLS_ACUITY_SCORE: 42
ADLS_ACUITY_SCORE: 46

## 2024-10-01 NOTE — PROGRESS NOTES
[facility]  WO Nurse Inpatient Assessment     Consulted for: knees, lower legs, toes  10/1 back  Summary: During evaluation, Patient requested WOCto observe lower legs. Podiatry consult for bilateral feet. Notified Medicine Resident, requested modified consult and made recommendations    Patient History (according to provider note(s):      Clarke Moreira is a 74 year old male with T2DM w/ neuropathy and chronic ulcers, pAF, HFpEF, HLD, MDD, on CPAP and uses walker at home presents after 2 syncopal episodes and admitted for sepsis 2/2 GNR (Citrobacter) bacteremia, now on IV antibiotics     Sepsis 2/2 Citrobacter bacteremia  Syncopal episodes x2 9/22  Patient presented after syncopal episode with rigors and leukocytosis of 17.3 with leftward ANC shift. UA bland without dysuria. Neuro causes of syncope less likely given negative head imaging though seizures cannot be completely ruled out. Cardiac causes of syncope less likely given unremarkable EKG findings and patient with recent cardiac workup for HFpEF and CTPE negative. Pt pan-scanned orthopaedically to rule out injuries 2/2 syncope without acute findings. Blood cultures positive for GNR - Citrobacter, speciation pending.  - Vanc/zosyn started empirically, consulted ID regarding starting cefepime in light of citrobacter, please re-page in AM  - Fluid resuscitation, received 1L in ED, ordered second 1L LR bolus. Given patient's body habitus, consider giving more fluids as not fully fluid resuscitated  - Dental hygiene consulted given sepsis of unclear source with dental caries being prior source of sepsis  - Podiatry consult given open wounds and sepsis of unclear source in patient with chronic diabetic ulcers and history of great toe osteomyelitis       Assessment:      Areas visualized during today's visit: Focused:, Lower extremities , and Bilateral knees    Wound location: Right knee and Right lower leg, Right  toes      9/24     10/1     9/24   10/1     9/24    10/1      Last photo: 10/1/2024  Wound type: Trauma and pressure, abrasion            Wound history/plan of care:   patient has had multiple falls at home where he reports having to drag or crawl across carpet and vinyl fela for multiple feet which took approx an hour to an hour and a half. Large intact purple hematoma formation under skin with centralized horizontal skin split and abrasion to 9 and 11 o'clock. Right lower leg with hemosiderin staining, cracked and hardened hyperkeratosis, scattered scabs and bruising. Toes and foot with thickened nails, hammer toes continuation of dry cracked skin bruising and abrasions to great and 2nd toes, appears related to trauma from fall and dragging toes across floor, dried drainage present. All areas improving, dry skin to lower leg is resolving, very dry thickened skin to feet, upon assessment no Urea lotion in room or on patient per WOC/ EMAR orders. Dry, intact scabs to toes.    Wound base: knee: 100 % Non-blanchable, Purple, Superficial scab, and hematoma  Toes:  100% dry intact scabs     Palpation of the wound bed: normal      Drainage: none     Description of drainage: none     Measurements (length x width x depth, in cm) total area 7 x 11 x 0cm,   linear open area to central wound: 0.2x 0.8 x 0cm  Toes: 1.5 x 1x0.1cm, no change 10/1    Periwound skin: Intact      Color: normal and consistent with surrounding tissue      Temperature: normal   Odor: none  Pain: mild, tender  Pain intervention prior to dressing change: patient tolerated well, no significant pain present , soaking, and slow and gentle cares   Treatment goal: Heal , Drainage control, Infection control/prevention, Maintain (prevention of deterioration), Protection, Promote epidermal migration, and Simplify plan of care  STATUS: improving  Supplies ordered: gathered, supplies stored on unit, and discussed with patient       Wound location: Left knee,  left lower leg and left toes       9/24    10/1     9/24    10/1     9/24   10/1    Last photo: 10/1/2024  Wound type: Trauma and pressure, abrasion            Wound history/plan of care:   patient has had multiple falls at home where he reports having to drag or crawl across carpet and vinyl fela for multiple feet which took approx an hour to an hour and a half. Large intact purple hematoma formation under skin with large abrasion open to dermis at 2-5 o'clock. Scattered scabs to lower left leg, hemosiderin staining and cracked and hardened hyperkeratosis. Toes and foot with thickened nails, hammer toes continuation of dry cracked skin. Abrasions to great, 2nd, 3rd and 4th toes, pink tissue surrounding the abrasions related to trauma from fall and dragging toes across floor. All areas improving 10/1, dry scabs to toes, bruising improved, abrasion improving. On assessment no xeroform dressing in place as ordered per WOC.  Wound base: knee: 50 % Dermis and Moist, 50% light  Purple, and hematoma  Toes: 100% scattered scabs     Palpation of the wound bed: normal      Drainage: scant     Description of drainage: serosanguinous     Measurements (length x width x depth, in cm)  hematoma: 4 x 5 x 0cm, Open area: 3.5 x 3.2x  0.1cm    Periwound skin: Intact      Color: normal and consistent with surrounding tissue      Temperature: normal   Odor: none  Pain: mild, tender  Pain intervention prior to dressing change: patient tolerated well, no significant pain present , soaking, and slow and gentle cares   Treatment goal: Heal , Drainage control, Infection control/prevention, Maintain (prevention of deterioration), Protection, Promote epidermal migration, and Simplify plan of care  STATUS:improving  Supplies ordered: gathered, supplies stored on unit, and discussed with patient     Wound location: upper mid back      Last photo: 10/1/2024  Wound due to: Unknown Etiology  Wound history/plan of care: small open lesion to  upper mid back with unclear etiology. Superficial epidermis skin loss not related to pressure. Does not appear acutely infected  Wound base: 100 % Dermis and Moist,      Palpation of the wound bed: normal      Drainage: scant     Description of drainage: serous     Measurements (length x width x depth, in cm): 1  x 1.1  x  0.1 cm      Periwound skin: Intact      Color: normal and consistent with surrounding tissue      Temperature: normal   Odor: none  Pain: denies , none  Pain interventions prior to dressing change: patient tolerated well, no significant pain present , and slow and gentle cares   Treatment goal: Heal , Infection control/prevention, Maintain (prevention of deterioration), Protection, Promote epidermal migration, and Simplify plan of care  STATUS: initial assessment  Supplies ordered: at bedside, ordered xeroform guaze, supplies stored on unit, discussed with RN, and discussed with patient      My PI Risk Assessment     Sensory Perception: 3 - Slightly Limited     Moisture: 3 - Occasionally moist      Activity: 3 - Walks occasionally      Mobility: 3 - Slightly limited      Nutrition: 3 - Adequate     Friction/Shear: 3 - No apparent problem      TOTAL: 18        Treatment Plan:     Bilateral knees: Every 3 days  Cleanse with Vashe wound cleanser and allow to dry  Paint periwound skin with no sting skin barrier wipe  Cover open  areas with Xeroform guaze and apply a mepilex foam dressing on top.    Upper mid back: every 3 days  Cleanse with Vashe wound cleanser and allow to dry  Paint periwound skin with no sting skin barrier wipe  Cover open  areas with Xeroform guaze and apply a mepilex foam dressing on top.    Bilateral lower legs from below knees to toes: with Lymphedema wrap changes every other day  Moisten 2 rolls  of kerlex with Vashe cleanser, wring out excess  Wrap each leg from toes to just below knees and  soak for 10 minutes.  Apply Atractain/Urea lotion (see EMAR) to tops of toes (not in  "between toes and feet  Apply Primapore dressings to cover abrasions on tops of toes    Upper extremities and generalized body: Daily and PRN  After bathing, apply sween cream or body lotion to maintain skin integrity and provide moisture to dry skin    Pressure Injury Prevention (PIP) Plan:  If patient is declining pressure injury prevention interventions: Explore reason why and address patient's concerns, Educate on pressure injury risk and prevention intervention(s), If patient is still declining, document \"informed refusal\" , and Ensure Care team is aware ( provider, charge nurse, etc)  Mattress: Follow bed algorithm, add Low Air Loss (Air+) mattress pump if skin is very moist or constantly moist.   HOB: Maintain at or below 30 degrees, unless contraindicated  Repositioning in bed: Every 1-2 hours , Left/right positioning; avoid supine, Raise foot of bed prior to raising head of bed, to reduce patient sliding down (shear), and Frequent microturns using positioning wedges, as patient tolerates  Heels: Keep elevated off mattress, Pillows under calves, and Heel lift boots  Protective Dressing: Sacral Mepilex for prevention (#235860),  especially for the agitated patient   Positioning Equipment:None  Chair positioning: Chair cushion (#[id number]) , Repositions self: patient to shift weight every 15 minutes, Assist patient to reposition hourly, Do NOT use a donut for sitting (this increases pressure to smaller area and creates a higher potential for injury) , and Order Bariatric chair cushion    If patient has a buttock pressure injury, or high risk for PI use chair cushion or SPS.  Moisture Management: Perineal cleansing /protection: Follow Incontinence Protocol, Avoid brief in bed, Clean and dry skin folds with bathing , Consider InterDry (#571716) between folds, and Moisturize dry skin  Under Devices: Inspect skin under all medical devices during skin inspection , Ensure tubes are stabilized without tension, and Ensure " patient is not lying on medical devices or equipment when repositioned  Ask provider to discontinue device when no longer needed.    Orders: Reviewed, Written, and Updated    RECOMMEND PRIMARY TEAM ORDER: Lymphedema consult and Podiatry consult  Education provided: importance of repositioning, plan of care, wound progress, Infection prevention , Moisture management, Hygiene, Off-loading pressure, and self cares  Discussed plan of care with: Patient and Physician  [facility] nurse follow-up plan: weekly  Notify WOC if wound(s) deteriorate.  Nursing to notify the Provider(s) and re-consult the WO Nurse if new skin concern.    DATA:     Current support surface: Bariatric Bariatric low air loss   Containment of urine/stool: Incontinence Protocol and Incontinent pad in bed  BMI: Body mass index is 51.29 kg/m .   Active diet order: Orders Placed This Encounter      Combination Diet Regular Diet     Output: I/O last 3 completed shifts:  In: 1200 [P.O.:1200]  Out: 3575 [Urine:3575]     Labs:   Recent Labs   Lab 09/30/24  0530 09/30/24  0024 09/27/24  0529 09/26/24  0549   HGB 10.6*  --    < > 9.8*   INR  --  1.86*   < > 1.40*   WBC 9.0  --    < > 8.3   A1C  --   --   --  6.5*    < > = values in this interval not displayed.     Pressure injury risk assessment:   Sensory Perception: 4-->no impairment  Moisture: 3-->occasionally moist  Activity: 2-->chairfast  Mobility: 2-->very limited  Nutrition: 3-->adequate  Friction and Shear: 2-->potential problem  Peterson Score: 16    Azeb Campbell, RN, BSN, CWOCN   Pager no longer is use, please contact through [employer] group: [facility] Nurse Powers  Dept. Office Number: [phone]

## 2024-10-01 NOTE — PROGRESS NOTES
[facility]    Progress Note - Medicine Service, ANGEL LUIS TEAM 3       Date of Admission:  9/22/2024    Assessment & Plan   Clarke Moreira is a 74 year old male with T2DM w/ neuropathy and chronic ulcers, pAF, HFpEF, HLD, MDD, on CPAP and uses walker at home presents after 2 syncopal episodes and admitted for possible sepsis. There is no longer concern for sepsis, refeeding syndrome. Afib is generally rate controlled with exception of transient heart rate rises with activity/exertion.     Today:   - in A fib with rvr, increased toprol to 150 mg daily  - if not controlled, will consider cards consult  - accepted to TCU for tomorrow, tentatively for noon        Paroxysmal atrial fibrillation  Elevated INR  Admit INR 3.12. EKG 9/23 continues to show Afib.  Noted to have sweating and heart rates between bradycardia to tachycardia with physical and Occupational Therapy.  - PTA metoprolol succinate 100 mg daily  - Increased metoprolol to 150mg daily given a fib with RVR occurring outside of episodes of anxiety or exertion, will monitor for resonse  - warfarin, pharmacy to dose goal 2-3  - Metoprolol 5mg IV PRN for Afib sustained RVR, HR>140.  Anticipate variable HR due to history of atrial fibrillation and self-limited tachycardia due to anxiety and poor tolerance of exertion.  - Restarted telemetry  - Considering discuss cards consult tomorrow after monitoring response to increased metoprolol     HFpEF  - restarted PTA torsemide 100 mg daily   - restarted PTA Kcl 40 meq BID   - PTA spironolactone 25 mg daily     New microcytic Anemia- stable  Patient reports last outpatient colonoscopy was <5 years ago and normal. He does not recall where the colonoscopy was performed.  Admit Hgb 11.6, currently 9.8. Iron was 14 and ferritin 28.  - daily hgb check  - c/w ferrous sulfate 325 mg supplementation  - [] consider outpatient colonoscopy and further workup     Pre Syncopal  episodes x2 on 9/22  No LOC with falls. These episodes are most likely vasovagal given the context of hypotension and a hot/humid apartment as well as prodrome of light headedness and dizziness.  Cardiac causes of syncope are less likely but still possible given the patient's first episode occurred when raising his arm (coronary steal) and EKG findings of Afib w RVR and nonspecific ST/T wave abnormalities. Neuro causes of syncope less likely given negative head imaging and no evidence of tongue biting though seizures cannot be completely ruled out. Pt pan-scanned orthopaedically to rule out injuries 2/2 syncope without acute findings. OT discharge rec is transitional care facility  - Continue to work with PT on rehab, monitor for further episodes     Type 2 diabetes w neuropathy  Chronic ulcerations  Onychomycosis  - WOC consulted  - HOLD metformin 500 mg daily, okay for sliding scale insulin while hospitalized but could resume metformin as he nears discharge     BUE Tremors, paroxysmal  Patient had three episodes of afebrile tremors. 11pm 9/22 nurse noted afebrile pale tremors. Around noon 9/23 patient had afebrile tremors w tachycardia; tachycardia rapidly improved (160->104bpm) with deep breathing exercises. Patient stated he was feeling overheated and anxious. 9/24 early morning patient had tremors; improved with application of 3 warm blankets. EKG demonstrated no acute changes ( Afib w RVR, chronic issue). These tremors are most likely related to patient's anxiety given (1) patient reported anxiety and (2) had a rapid response to deep breathing. Rigors related to infection are less likely (given patient's rapid response to deep breathing exercises and improvement in infectious Sx) but still possible.  -Hydroxyzine 50mg q6h ordered     Diffuse Osteopenia   Osteopenia noted on foot and ankle x-ray on admission. Acute or subacute intra-articular fracture of the medial base of the first proximal phalanx.   -Checked  Vitamin D levels- normal  - Calcium low-normal (8.5)  [] Follow up outpatient with PCP or Endocrine     Deconditioning  -Ongoing PT and OT needs, anticipate discharge to TCU     Peridontal disease, chronic  Evaluated by dentistry for source of possible sepsis. Imaging confirmed possible root tip maxillary left second molar (#15). Dentistry recs:  - start chlorhexidine oral rinse BID for 4-8 weeks  - follow up with hospital dental clinic in [facility] for further evaluation.     Resolved issues and chronic issues:      Passive suicidal ideation  Hx active suicidal ideation, MDD  Frequent psychology visits. On risk assessment at admission, patient denies suicidal intent or plan. Patient says that as he ages, his desire for self-harm decreases as his end would be a natural process due to old age. 9/24 patient endorsed active SI stating he is looking for ways to kill himself. 9/25 patient no longer has active SI and declined further mental health conversations     Concern for Sepsis, resolved   Patient presented after syncopal episodes with rigors and leukocytosis of 17.3 with leftward ANC shift and Lactate 3.4. UA bland without dysuria. CTAP with cholelithiasis, otherwise unremarkable. No respiratory sxs. MRSA nares negative. Suspect source from skin abrasions vs dental vs bacterimia, however Dental CT demonstrated no signs of abscess. S/p IVF and on empiric abx. ID stopped empiric abx 9/24  - Blood Cx 9/22: NGTD 24 hours.  - Vanc/zosyn started empirically; 9/22-9/24  - ID consulted  - Wound care consulted for LE wounds              - lymphedema consult   - wound cares  - Podiatry consulted: s/p wound cleansing at bedside      #Anion gap metabolic acidosis due to lactic acidosis -resolved     #MICHELLE on CKD 3a, resolved  Admit Cr. 1.51, baseline Cr 1. Likely in setting of sepsis and hypovolemia  - continue hydration as above  - bladder scan prn  - Avoid NSAIDs  - CTM, BMP      Hypokalemia, Hypophosphatemia  Concern for  refeeding syndrome -resolved  Current K+ is 3.7 was 3.6 9/22-9/24. Phosphorus currently 2.8 (NL) increased from 2.3 (9/25).  magnesium WNL (2.0). Patient ate for the first time on 9/25 after 2 days of no food. concern refeeding syndrome.   -Initiated phosphorus standard replacement protocol- RN Managed  -CTM     Hypothyroidism  - PTA levothyroxine 175 mcg daily     HLD  - atorvastatin 40 mg     BPH  - PTA tamsulosin 0.4 mg daily     Chronic hypercapnic respiratory failure  - CPAP at night     MDD  CHRIS  Not on pta meds.  -hydroxyzine prn             Diet: Combination Diet Regular Diet    DVT Prophylaxis: Warfarin  Bazzi Catheter: Not present  Fluids: none  Lines: None     Cardiac Monitoring: ACTIVE order. Indication: Tachyarrhythmias, acute (48 hours)  Code Status: Full Code      Clinically Significant Risk Factors                  # Hypertension: Noted on problem list          # DMII: A1C = 6.5 % (Ref range: <5.7 %) within past 6 months       # Financial/Environmental Concerns: none         Disposition Plan     Expected Discharge Date: 10/03/2024    Discharge Delays: *Medically Ready for Discharge  Placement - TCU    Discharge Comments: PT rec'ed TCU, awaitiing TCU placement        The patient's care was discussed with the Attending Physician, Dr. Pickard .    Lc Curtis MD  Medicine Service, [address]  Securely message with [employer] (more info)  Text page via [facility] Paging/Directory   See signed in provider for up to date coverage information        Physician Attestation   I saw this patient with the resident and agree with the resident/fellow's findings and plan of care as documented in the note.  See my edits in blue.      Mary Pickard MD  Date of Service (when I saw the patient): 10/01/24    ______________________________________________________________________    Interval History   A fib rvr overnight with sustained rates above 140.  This  morning, required prn metoprolol x 2 during PT. Also noted to go into Vtach/Vfib briefly but was asymptomatic and talking the entire time. Some shortness of breath with minimal movement.  Denies any palpitations, chest pain, shortness of breath.    Physical Exam   Vital Signs: Temp: 98.1  F (36.7  C) Temp src: Oral BP: 124/68 Pulse: 89   Resp: 18 SpO2: 98 % O2 Device: None (Room air)    Weight: 367 lbs 11.64 oz    General: obese, no acute distress  Eyes: no icterus, no conjunctival injection, no papilledema  Cardiac: irregular rate/rhythm, no murmurs/rubs/gallops. mild pitting edema of bilateral lower legs and feet with lymphedema wraps  Pulmonary: clear to auscultation, no wheezes/rales  Skin: dry scaly skin over legs bilaterally and ecchymoses over knee. Open small abrasion wounds over toes bilaterally  Mental Status: awake and alert    Medical Decision Making       Please see A&P for additional details of medical decision making.      Data     I have personally reviewed the following data over the past 24 hrs:    INR:  2.48 (H) PTT:  N/A   D-dimer:  N/A Fibrinogen:  N/A       Imaging results reviewed over the past 24 hrs:   No results found for this or any previous visit (from the past 24 hour(s)).

## 2024-10-01 NOTE — PROGRESS NOTES
Care Management Follow Up    Length of Stay (days): 9    Expected Discharge Date: 10/03/2024     Concerns to be Addressed: discharge planning, home safety     Patient plan of care discussed at interdisciplinary rounds: Yes    Anticipated Discharge Disposition: Transitional Care  Anticipated Discharge Services: Transportation Services  Anticipated Discharge DME: None    Patient/family educated on Medicare website which has current facility and service quality ratings: yes  Education Provided on the Discharge Plan: Yes  Patient/Family in Agreement with the Plan: yes    Referrals Placed by CM/SW:      Accepted  [facility]  [address] [place] [address]  [facility] Referral  Liaison: Kayla Price  P: [phone]  F: [phone]    Pending  [facility]  [address] [facility] [address]  P: [phone]     [facility]   [facility]   [facility]: Bariatric needs  Private pay costs discussed: Not applicable    Discussed  Partnership in Safe Discharge Planning  document with patient/family: Yes     Handoff Completed: Will be completed at the time of discharge    Additional Information:  Per chart review patient was placed back on tele yesterday.  SW touched based with the primary teams asking if the patient is still med ready. Team reported patient is no longer med ready and they are adjusting the patient's Metoprolol. The team is hoping the patient will be med ready in 24 hours.     Kayla Price contacted SW and reported they can offer the patient a bed tomorrow.    [facility] set up stretcher transportation (due to bariatric needs) for tomorrow 10/2 with a pickup window of [phone]    [facility] completed PCS form, faxed it to billing, and placed completed form in the patient's chart.    SW updated Kayla and care team on transport time.    Next Steps: SW will touch base with the team  again tomorrow. SW will follow up on remaining referrals when patient is med ready. SW will continue to follow for discharge needs and placement.     Israel Myers, \[facility] [place] [facility]\""  [address]   Office: [phone]  [email or web address]  Securely message with [employer] (Search Name or 7C Med Surg [id number] SW)  Text page via [facility] Paging/Directory   See signed in provider for up to date coverage information  Social Work & Care Management Department

## 2024-10-01 NOTE — PROGRESS NOTES
"Clinical Nutrition Services- Brief Note    Clarke Moreira is a/an 74 year old male assessed by the dietitian for LOS  Patient admitted for after 2 syncopal episodes and admitted for possible sepsis. There is no longer concern for sepsis, refeeding syndrome. Afib is generally rate controlled with exception of transient heart rate rises with activity/exertion   MEDICAL HISTORY  T2DM w/ neuropathy and chronic ulcers, pAF, HFpEF, HLD, MDD, on CPAP and uses walker at home   Pt is tolerating a  diet, eating well per nursing documentation and [employer] review. Typically having 100% intake of 2 large meals/day noted. LBM 9/30. Weight history shows no significant weight loss. No nutrition issues identified at this time.   ANTHROPOMETRICS  Height: 5'11\"  Most Recent Weight: (!) 166.8 kg (367 lb 11.6 oz)    IBW: 78.2 kg  Body mass index is 51.29 kg/m . BMI Category: Obesity Grade III BMI >40  Weight History: .6 kg (1%) weight loss over 1 year. Wt fluctuates likely r/t fluid over the last year  Wt Readings from Last 15 Encounters:   09/28/24 (!) 166.8 kg (367 lb 11.6 oz)   07/31/24 (!) 181 kg (399 lb)   07/11/24 (!) 181.2 kg (399 lb 8 oz)   03/15/24 (!) 177 kg (390 lb 3.2 oz)   09/13/23 (!) 167.4 kg (369 lb)   07/03/23 (!) 167.4 kg (369 lb)   12/13/22 (!) 183.7 kg (405 lb)   05/09/22 (!) 183.3 kg (404 lb)   12/02/21 (!) 181.4 kg (400 lb)   06/21/21 (!) 175.7 kg (387 lb 6.4 oz)   11/09/20 (!) 180.1 kg (397 lb)   11/07/19 (!) 168.8 kg (372 lb 1.6 oz)   10/30/19 (!) 170.6 kg (376 lb 1.6 oz)   09/26/19 (!) 175.1 kg (386 lb)   09/18/19 (!) 176.9 kg (390 lb 1.6 oz)     LABS  Glu 133    MEDICATIONS  Lipitor  Iron  Synthroid  Metformin  Thera-vit-M  Kcl  Senna-docusate  [place]  Demadex  B complex  Vit c  Vit D3  warfarin    Peterson Score: 16  Per EMR: Skin  Skin WDL: .WDL except, integrity  Skin WDL: WDL  Skin Integrity: bruised (ecchymotic), scab  Abrasion / Excoriation: Right, Left, Knee  Bruised (ecchymotic) location: Right, " Left, Knee  Scab: Other (Comment) (scattered)  Device Skin Interventions Taken: No adjustments needed    Adela Maki MS, RD, LD, CNSC    6C (beds 0100-0105) + 7C (beds 1571-7869) + ED   Available in Vocera by name or unit dietitian

## 2024-10-01 NOTE — CONSULTS
Dental Hygiene Consult Service: Follow-up Visit        Clarke Moreira MRN# [id number]   YOB: 1950 Age: 74 year old     Date of Admission: 9/22/2024  PCP is Henrry Shepard  Date of Service: 10/1/2024           Reason for Consult:   Referring MD & Reason for Visit: I was asked by Nikhil Barahona MD, to see Clarke Moreira for a LIMITED oral assessment regarding follow-up to initial visit.     *Please note: dental hygiene services, including oral assessment, are not a replacement for examination by a licensed dentist. The patient has been informed of the oral assessment procedures and has provided verbal consent.       History of Present Illness:   This patient is a 74 year old male with a history of T2DM w/ neuropathy and chronic ulcers, pAF, HFpEF, HLD, MDD, on CPAP and uses walker at home presents after 2 syncopal episodes and admitted for possible sepsis. There is no longer concern for sepsis, refeeding syndrome. Afib is generally rate controlled with exception of transient heart rate rises with activity/exertion. .  Dental and oropharyngeal history is pertinent for reported history of sepsis w/ dental caries as origin. On initial consult, pt's oral hygiene was fair to poor, with mild-moderate gingival inflammation and moderate plaque throughout, dental caries confirmed on dental CT, dry mouth.  The patient reports the dry mouth rinse is aiding his comfort and feeling relief for longer. Nurses have been helpful in giving reminders to complete oral care. .         General Observations   Level of support Patient requires some assistance:  May ask for oral care products to be brought to bedside. Can complete oral cares independently.    Patient currently in pain No   Patient wears dentures No   Current oral care routine - Brushing 2-3x/day and using the Biotene rinse as needed.    Patient has oral care products Toothbrush, Toothpaste, and Mouthrinse   Extraoral assessment   General appearance  Symmetrical and Normal TMJ function   Lymph nodes Symmetrical, no abnormalities, non-tender   Oral Health Assessment Tool (OHAT)   Lips 0=Healthy: smooth, pink, moist   Tongue 1=Changes: (ie. patchy, fissured, red, coated ) - slightly dry , fissured.    Gums and Tissues 1=Changes: (ie. dry, shiny, rough, red, swollen, one ulcer/sore spot (under dentures)) - Very localized, mild gingival inflammation.Mild white plaques on tongue, back of throat.     Saliva 1=Changes: (ie. dry, sticky tissues, little saliva present, resident thinks they have dry mouth )- slightly dry saliva, improved from last visit.    Natural Teeth Yes/No 2=Unhealthy: (ie. 4+ decayed or broken teeth/roots, or very worn down teeth, or less than 4 teeth) - caries on dental CT, areas where teeth have been worn down significantly. Chipped #8 from fall.    Dentures Yes/No Patient does not wear dentures   Oral Cleanliness 1=Changes:(ie. food particles/tartar/plaque in 1-2 areas of the mouth or on small area of dentures or halitosis (bad breath)) - oral cleanliness improved. Localized areas of food impaction.    Dental Pain 0=Healthy: no behavioural, verbal, or physical signs of dental pain   OHAT Total Score (0-16) 6   Other Dental Concerns Patient does not have dental home and Infrequent professional dental care   Care provided during assessment Oral Assessment and Patient education   Follow up DH assessments required? No   Connect with NC or SOD care? No   Oral Care Plan - Continue with good oral care, brushing along gumline 2-3x/day. Reminders from nursing is much appreciated.   - Continue to encourage biotene use 2-3x/day and PRN, prior to bed.   - Encouraged pt to seek out-patient comprehensive dental care to establish dental home. Gave information for [facility] dental clinics.  - Mild white sticky plaques at base of tongue and back of throat on assessment. Tissue very dry, not suspicious for thrush at this time, continue to monitor. Encouraged pt to  gargle with the biotene.             Assessment and Plan:   Assessment:  This patient is a 74 year old male with a history of T2DM w/ neuropathy and chronic ulcers, pAF, HFpEF, HLD, MDD, on CPAP and uses walker at home presents after 2 syncopal episodes and admitted for sepsis 2/2 GNR (Citrobacter) bacteremia, now on IV antibiotics , oral exam concerning for no dental home, xerostomia.     Plan:   Implement oral care plan as described above.  Pt to continue comprehensive dental care at dental clinic of choice. Information below for the [facility] if pt would like to use as a resource:  [facility] - [address] [place] [address], [id number]  [phone]    CELI Abraham  Message on startuply  Pager: [phone]      Past Medical History   I have reviewed this patient's medical history and updated it with pertinent information if needed.  Past Medical History:   Diagnosis Date    Atrial fibrillation (H)     Basal cell carcinoma     Chronic anticoagulation     Diastolic heart failure (H)     Dyslipidemia     Essential hypertension     Morbid obesity (H)     Sleep-disordered breathing     Type 2 diabetes mellitus (H)        Past Surgical History   I have reviewed this patient's surgical history and updated it with pertinent information if needed.  Past Surgical History:   Procedure Laterality Date    BIOPSY OF SKIN LESION      MOHS MICROGRAPHIC PROCEDURE      PICC INSERTION Right 09/24/2017    5fr TL Bard PICC, 51cm (1cm external) in the R medial brachial vein w/ tip in the SVC.       Social History   I have reviewed this patient's social history and updated it with pertinent information if needed.  Social History     Tobacco Use    Smoking status: Never    Smokeless tobacco: Never   Substance Use Topics    Alcohol use: No     Comment: Former alcohol abuse. Quit 70s then quit later in 80s-present    Drug use: No     Comment: history of LSD use     Prior to Admission Medications    Prior to Admission Medications   Prescriptions Last Dose Informant Patient Reported? Taking?   CERTAVITE/ANTIOXIDANTS tablet Past Week  No Yes   Sig: TAKE ONE TABLET BY MOUTH ONE TIME DAILY   Omega-3 Fatty Acids (EQL FISH OIL) 1000 MG CAPS Past Week  No Yes   Sig: Take 1 capsule by mouth daily   Vitamin D, Cholecalciferol, 25 MCG (1000 UT) CAPS Past Week  Yes Yes   Sig: Take 1 capsule by mouth daily.   ammonium lactate (LAC-HYDRIN) 12 % external cream Past Week  No Yes   Sig: Apply topically 2 times daily as needed for dry skin   aspirin (ASA) 81 MG tablet Past Week  No Yes   Sig: Take 1 tablet (81 mg) by mouth daily   atorvastatin (LIPITOR) 40 MG tablet Past Week  No Yes   Sig: Take 1 tablet (40 mg) by mouth daily   levothyroxine (SYNTHROID/LEVOTHROID) 175 MCG tablet Past Week  No Yes   Sig: Take 1 tablet (175 mcg) by mouth every morning (before breakfast)   metFORMIN (GLUCOPHAGE) 500 MG tablet Past Week  No Yes   Sig: Take 1 tablet (500 mg) by mouth 2 times daily (with meals)   metoprolol succinate ER (TOPROL XL) 100 MG 24 hr tablet Past Week  No Yes   Sig: Take 1 tablet (100 mg) by mouth daily   nystatin (MYCOSTATIN) 964694 UNIT/GM external cream More than a month  No Yes   Sig: Apply topically 2 times daily as needed for dry skin   order for DME   No No   Sig: Equipment being ordered: Other: Velcro Compression stockings.   Treatment Diagnosis: bilateral lymphedema.   order for DME   No No   Sig: Equipment being ordered: Bariatric Lift chair  Diagnosis - morbid obesity, CHF, Lymphedema    Fax to Ne from [employer] at [phone].   order for DME   No No   Sig: Equipment being ordered: Custom diabetic shoes   order for DME   No No   Sig: Equipment being ordered: Diabetes Shoes   potassium chloride chen ER (KLOR-CON M20) 20 MEQ CR tablet Past Week  No Yes   Sig: Take 2 tablets (40 mEq) by mouth 2 times daily   senna-docusate (SENEXON-S) 8.6-50 MG tablet Past Week  No Yes   Sig: Take 1 to 2 tablets by mouth 2  times daily as needed for constipation   spironolactone (ALDACTONE) 25 MG tablet Past Week  No Yes   Sig: Take 2 tablets (50 mg) by mouth daily   tamsulosin (FLOMAX) 0.4 MG capsule Past Week  No Yes   Sig: Take 1 capsule (0.4 mg) by mouth daily   torsemide (DEMADEX) 100 MG tablet Past Week  No Yes   Sig: Take 1 tablet (100 mg) by mouth daily   Patient taking differently: Take 100 mg by mouth daily (with lunch).   vitamin B complex with vitamin C (VITAMIN  B COMPLEX) tablet   No No   Sig: Take 1 tablet by mouth daily   vitamin C (ASCORBIC ACID) 1000 MG TABS Past Week  Yes Yes   Sig: Take 1,000 mg by mouth daily   vitamin E (VITAMIN E COMPLEX) 1000 units (450 mg) capsule Past Week  No Yes   Sig: Take 1 capsule (1,000 Units) by mouth daily   warfarin ANTICOAGULANT (COUMADIN) 5 MG tablet 9/21/2024  No Yes   Sig: Take 5 mg (5 mg x 1) every Thu; 10 mg (5 mg x 2) all other days or as directed by the INR clinic.   Patient taking differently: Patient takes 10 mg (5 mg x 2) everyday or as directed by the INR clinic.      Facility-Administered Medications: None     Allergies   Allergies   Allergen Reactions    Seasonal Allergies      Physical Exam

## 2024-10-01 NOTE — PLAN OF CARE
Goal Outcome Evaluation:      Plan of Care Reviewed With: patient          Outcome Evaluation: Pt is a/o x 4. pain rated up to 7/10, however, declined pain regimen, ice or warm pack. HR range between  this shift. VSS. Slept most of the night.

## 2024-10-02 ENCOUNTER — APPOINTMENT (OUTPATIENT)
Dept: OCCUPATIONAL THERAPY | Facility: CLINIC | Age: 74
DRG: 812 | End: 2024-10-02
Payer: COMMERCIAL

## 2024-10-02 LAB
ANION GAP SERPL CALCULATED.3IONS-SCNC: 14 MMOL/L (ref 7–15)
BUN SERPL-MCNC: 25.2 MG/DL (ref 8–23)
CALCIUM SERPL-MCNC: 9.1 MG/DL (ref 8.8–10.4)
CHLORIDE SERPL-SCNC: 98 MMOL/L (ref 98–107)
CREAT SERPL-MCNC: 1.19 MG/DL (ref 0.67–1.17)
EGFRCR SERPLBLD CKD-EPI 2021: 64 ML/MIN/1.73M2
ERYTHROCYTE [DISTWIDTH] IN BLOOD BY AUTOMATED COUNT: 15.8 % (ref 10–15)
GLUCOSE BLDC GLUCOMTR-MCNC: 136 MG/DL (ref 70–99)
GLUCOSE BLDC GLUCOMTR-MCNC: 158 MG/DL (ref 70–99)
GLUCOSE SERPL-MCNC: 170 MG/DL (ref 70–99)
HCO3 SERPL-SCNC: 21 MMOL/L (ref 22–29)
HCT VFR BLD AUTO: 35.6 % (ref 40–53)
HGB BLD-MCNC: 10.7 G/DL (ref 13.3–17.7)
INR PPP: 2.41 (ref 0.85–1.15)
INR PPP: 2.59 (ref 0.85–1.15)
MCH RBC QN AUTO: 23.2 PG (ref 26.5–33)
MCHC RBC AUTO-ENTMCNC: 30.1 G/DL (ref 31.5–36.5)
MCV RBC AUTO: 77 FL (ref 78–100)
OSMOLALITY SERPL: 286 MMOL/KG (ref 280–301)
OSMOLALITY UR: 434 MMOL/KG (ref 100–1200)
PHOSPHATE SERPL-MCNC: 3.6 MG/DL (ref 2.5–4.5)
PLATELET # BLD AUTO: 401 10E3/UL (ref 150–450)
POTASSIUM SERPL-SCNC: 4.1 MMOL/L (ref 3.4–5.3)
RBC # BLD AUTO: 4.62 10E6/UL (ref 4.4–5.9)
SODIUM SERPL-SCNC: 133 MMOL/L (ref 135–145)
SODIUM UR-SCNC: <20 MMOL/L
WBC # BLD AUTO: 7.5 10E3/UL (ref 4–11)

## 2024-10-02 PROCEDURE — 36415 COLL VENOUS BLD VENIPUNCTURE: CPT | Performed by: INTERNAL MEDICINE

## 2024-10-02 PROCEDURE — 250N000013 HC RX MED GY IP 250 OP 250 PS 637

## 2024-10-02 PROCEDURE — 84300 ASSAY OF URINE SODIUM: CPT

## 2024-10-02 PROCEDURE — 36415 COLL VENOUS BLD VENIPUNCTURE: CPT

## 2024-10-02 PROCEDURE — 99222 1ST HOSP IP/OBS MODERATE 55: CPT | Mod: GC | Performed by: INTERNAL MEDICINE

## 2024-10-02 PROCEDURE — 83935 ASSAY OF URINE OSMOLALITY: CPT

## 2024-10-02 PROCEDURE — 97530 THERAPEUTIC ACTIVITIES: CPT | Mod: GO

## 2024-10-02 PROCEDURE — 85610 PROTHROMBIN TIME: CPT | Performed by: INTERNAL MEDICINE

## 2024-10-02 PROCEDURE — 99232 SBSQ HOSP IP/OBS MODERATE 35: CPT | Mod: GC | Performed by: INTERNAL MEDICINE

## 2024-10-02 PROCEDURE — 80048 BASIC METABOLIC PNL TOTAL CA: CPT

## 2024-10-02 PROCEDURE — 83930 ASSAY OF BLOOD OSMOLALITY: CPT

## 2024-10-02 PROCEDURE — 84100 ASSAY OF PHOSPHORUS: CPT | Performed by: INTERNAL MEDICINE

## 2024-10-02 PROCEDURE — 120N000002 HC R&B MED SURG/OB UMMC

## 2024-10-02 PROCEDURE — 258N000003 HC RX IP 258 OP 636

## 2024-10-02 PROCEDURE — 85027 COMPLETE CBC AUTOMATED: CPT

## 2024-10-02 PROCEDURE — 250N000013 HC RX MED GY IP 250 OP 250 PS 637: Performed by: STUDENT IN AN ORGANIZED HEALTH CARE EDUCATION/TRAINING PROGRAM

## 2024-10-02 PROCEDURE — 250N000013 HC RX MED GY IP 250 OP 250 PS 637: Performed by: INTERNAL MEDICINE

## 2024-10-02 RX ORDER — WARFARIN SODIUM 10 MG/1
10 TABLET ORAL
Status: COMPLETED | OUTPATIENT
Start: 2024-10-02 | End: 2024-10-02

## 2024-10-02 RX ORDER — METOPROLOL TARTRATE 1 MG/ML
5 INJECTION, SOLUTION INTRAVENOUS EVERY 5 MIN PRN
Status: DISCONTINUED | OUTPATIENT
Start: 2024-10-02 | End: 2024-10-03 | Stop reason: HOSPADM

## 2024-10-02 RX ADMIN — METFORMIN HYDROCHLORIDE 500 MG: 500 TABLET, FILM COATED ORAL at 18:03

## 2024-10-02 RX ADMIN — ATORVASTATIN CALCIUM 40 MG: 40 TABLET, FILM COATED ORAL at 08:25

## 2024-10-02 RX ADMIN — POTASSIUM CHLORIDE 40 MEQ: 750 TABLET, EXTENDED RELEASE ORAL at 19:52

## 2024-10-02 RX ADMIN — LEVOTHYROXINE SODIUM 175 MCG: 0.05 TABLET ORAL at 08:25

## 2024-10-02 RX ADMIN — SODIUM CHLORIDE 500 ML: 9 INJECTION, SOLUTION INTRAVENOUS at 15:26

## 2024-10-02 RX ADMIN — Medication 25 MCG: at 08:25

## 2024-10-02 RX ADMIN — SPIRONOLACTONE 50 MG: 50 TABLET ORAL at 08:24

## 2024-10-02 RX ADMIN — TAMSULOSIN HYDROCHLORIDE 0.4 MG: 0.4 CAPSULE ORAL at 08:25

## 2024-10-02 RX ADMIN — WARFARIN SODIUM 10 MG: 10 TABLET ORAL at 18:03

## 2024-10-02 RX ADMIN — CHLORHEXIDINE GLUCONATE 0.12% ORAL RINSE 15 ML: 1.2 LIQUID ORAL at 19:52

## 2024-10-02 RX ADMIN — POTASSIUM CHLORIDE 40 MEQ: 750 TABLET, EXTENDED RELEASE ORAL at 08:25

## 2024-10-02 RX ADMIN — CHLORHEXIDINE GLUCONATE 0.12% ORAL RINSE 15 ML: 1.2 LIQUID ORAL at 08:24

## 2024-10-02 RX ADMIN — TORSEMIDE 100 MG: 100 TABLET ORAL at 11:44

## 2024-10-02 RX ADMIN — METOPROLOL SUCCINATE 150 MG: 50 TABLET, EXTENDED RELEASE ORAL at 08:25

## 2024-10-02 RX ADMIN — B-COMPLEX W/ C & FOLIC ACID TAB 1 TABLET: TAB at 08:24

## 2024-10-02 RX ADMIN — METFORMIN HYDROCHLORIDE 500 MG: 500 TABLET, FILM COATED ORAL at 08:24

## 2024-10-02 RX ADMIN — Medication 1 TABLET: at 11:44

## 2024-10-02 RX ADMIN — OXYCODONE HYDROCHLORIDE AND ACETAMINOPHEN 1000 MG: 500 TABLET ORAL at 08:24

## 2024-10-02 ASSESSMENT — ACTIVITIES OF DAILY LIVING (ADL)
ADLS_ACUITY_SCORE: 38
ADLS_ACUITY_SCORE: 34
ADLS_ACUITY_SCORE: 38
ADLS_ACUITY_SCORE: 38
ADLS_ACUITY_SCORE: 34
ADLS_ACUITY_SCORE: 38
ADLS_ACUITY_SCORE: 34
ADLS_ACUITY_SCORE: 38
ADLS_ACUITY_SCORE: 34
ADLS_ACUITY_SCORE: 34
ADLS_ACUITY_SCORE: 38

## 2024-10-02 NOTE — PLAN OF CARE
Goal Outcome Evaluation:      Plan of Care Reviewed With: patient  Overall Patient Progress: no change  Overall Patient Progress: no change     Assumed care [id number]     Events this shift: A fib & SVT continue (even while at rest); asymptomatic. PRN IV metoprolol given x2; MD aware. Wound cares done with WOC. Denies pain.

## 2024-10-02 NOTE — PROGRESS NOTES
[facility]    Progress Note - Medicine Service, ANGEL LUIS TEAM 3       Date of Admission:  9/22/2024    Assessment & Plan   Clarke Moreira is a 74 year old male with T2DM w/ neuropathy and chronic ulcers, pAF, HFpEF, HLD, MDD, on CPAP and uses walker at home presents after 2 syncopal episodes and admitted for possible sepsis. There is no longer concern for sepsis, refeeding syndrome. Afib is generally rate controlled with exception of transient heart rate rises with activity/exertion.     Today:   - cards consulted for arrhythmia    -likely artifacts and OK with metoprolol 150 mg daily  - urine studies for hyponatremia, likely hypovolemia in the setting of diuretics  - 500cc NS to challenge Na  - PT/OT  - appreciate SW coordination with TCU placement          Paroxysmal atrial fibrillation  Elevated INR  Admit INR 3.12. EKG 9/23 continues to show Afib.  Noted to have sweating and heart rates between bradycardia to tachycardia with physical and Occupational Therapy.  - Restarted telemetry  - cards consulted:   - ok with increasing pta metoprolol to 150mg daily for a fib with rvr  - reviewed tele and episodes of asystole and V tach are artifact.  - patient safe to discharge from cards perspective  - PTA metoprolol succinate 100 mg daily  - Increased metoprolol to 150mg daily given a fib with RVR occurring outside of episodes of anxiety or exertion, will monitor for resonse  - warfarin, pharmacy to dose goal 2-3  - Metoprolol 5mg IV PRN for Afib sustained RVR, HR>140.  Anticipate variable HR due to history of atrial fibrillation and self-limited tachycardia due to anxiety and poor tolerance of exertion.     Hyponatremia, asymptomatic  Na 133 on 10/2, down from 137. Urine studies with Na <20, urine osm 434, and sOsm 288. Although isotonic, suspect he is hypovolumic in the setting of diuretics. Will challenge with IVF and recheck Na with AM labs  - 500cc NS bolus  - Na  check in AM      HFpEF  - restarted PTA torsemide 100 mg daily   - restarted PTA Kcl 40 meq BID   - PTA spironolactone 25 mg daily     New microcytic Anemia- stable  Patient reports last outpatient colonoscopy was <5 years ago and normal. He does not recall where the colonoscopy was performed.  Admit Hgb 11.6, currently 9.8. Iron was 14 and ferritin 28.  - daily hgb check  - c/w ferrous sulfate 325 mg supplementation  - [] consider outpatient colonoscopy and further workup     Pre Syncopal episodes x2 on 9/22  No LOC with falls. These episodes are most likely vasovagal given the context of hypotension and a hot/humid apartment as well as prodrome of light headedness and dizziness.  Cardiac causes of syncope are less likely but still possible given the patient's first episode occurred when raising his arm (coronary steal) and EKG findings of Afib w RVR and nonspecific ST/T wave abnormalities. Neuro causes of syncope less likely given negative head imaging and no evidence of tongue biting though seizures cannot be completely ruled out. Pt pan-scanned orthopaedically to rule out injuries 2/2 syncope without acute findings. OT discharge rec is transitional care facility  - Continue to work with PT on rehab, monitor for further episodes     Type 2 diabetes w neuropathy  Chronic ulcerations  Onychomycosis  - WOC consulted  - HOLD metformin 500 mg daily, okay for sliding scale insulin while hospitalized but could resume metformin as he nears discharge     BUE Tremors, paroxysmal  Patient had three episodes of afebrile tremors. 11pm 9/22 nurse noted afebrile pale tremors. Around noon 9/23 patient had afebrile tremors w tachycardia; tachycardia rapidly improved (160->104bpm) with deep breathing exercises. Patient stated he was feeling overheated and anxious. 9/24 early morning patient had tremors; improved with application of 3 warm blankets. EKG demonstrated no acute changes ( Afib w RVR, chronic issue). These tremors are  most likely related to patient's anxiety given (1) patient reported anxiety and (2) had a rapid response to deep breathing. Rigors related to infection are less likely (given patient's rapid response to deep breathing exercises and improvement in infectious Sx) but still possible.  -Hydroxyzine 50mg q6h ordered     Diffuse Osteopenia   Osteopenia noted on foot and ankle x-ray on admission. Acute or subacute intra-articular fracture of the medial base of the first proximal phalanx.   -Checked Vitamin D levels- normal  - Calcium low-normal (8.5)  [] Follow up outpatient with PCP or Endocrine     Deconditioning  -Ongoing PT and OT needs, anticipate discharge to TCU     Peridontal disease, chronic  Evaluated by dentistry for source of possible sepsis. Imaging confirmed possible root tip maxillary left second molar (#15). Dentistry recs:  - start chlorhexidine oral rinse BID for 4-8 weeks  - follow up with hospital dental clinic in [facility] for further evaluation.     Resolved issues and chronic issues:      Passive suicidal ideation  Hx active suicidal ideation, MDD  Frequent psychology visits. On risk assessment at admission, patient denies suicidal intent or plan. Patient says that as he ages, his desire for self-harm decreases as his end would be a natural process due to old age. 9/24 patient endorsed active SI stating he is looking for ways to kill himself. 9/25 patient no longer has active SI and declined further mental health conversations     Concern for Sepsis, resolved   Patient presented after syncopal episodes with rigors and leukocytosis of 17.3 with leftward ANC shift and Lactate 3.4. UA bland without dysuria. CTAP with cholelithiasis, otherwise unremarkable. No respiratory sxs. MRSA nares negative. Suspect source from skin abrasions vs dental vs bacterimia, however Dental CT demonstrated no signs of abscess. S/p IVF and on empiric abx. ID stopped empiric abx 9/24  - Blood Cx 9/22: NGTD 24 hours.  -  Vanc/zosyn started empirically; 9/22-9/24  - ID consulted  - Wound care consulted for LE wounds              - lymphedema consult   - wound cares  - Podiatry consulted: s/p wound cleansing at bedside      #Anion gap metabolic acidosis due to lactic acidosis -resolved     #MICHELLE on CKD 3a, resolved  Admit Cr. 1.51, baseline Cr 1. Likely in setting of sepsis and hypovolemia  - continue hydration as above  - bladder scan prn  - Avoid NSAIDs  - CTM, BMP      Hypokalemia, Hypophosphatemia  Concern for refeeding syndrome -resolved  Current K+ is 3.7 was 3.6 9/22-9/24. Phosphorus currently 2.8 (NL) increased from 2.3 (9/25).  magnesium WNL (2.0). Patient ate for the first time on 9/25 after 2 days of no food. concern refeeding syndrome.   -Initiated phosphorus standard replacement protocol- RN Managed  -CTM     Hypothyroidism  - PTA levothyroxine 175 mcg daily     HLD  - atorvastatin 40 mg     BPH  - PTA tamsulosin 0.4 mg daily     Chronic hypercapnic respiratory failure  - CPAP at night     MDD  CHRIS  Not on pta meds.  -hydroxyzine prn             Diet: Combination Diet Regular Diet    DVT Prophylaxis: Warfarin  Bazzi Catheter: Not present  Fluids: 500 ml NS  Lines: None     Cardiac Monitoring: ACTIVE order. Indication: Tachyarrhythmias, acute (48 hours)  Code Status: Full Code      Clinically Significant Risk Factors         # Hyponatremia: Lowest Na = 133 mmol/L in last 2 days, will monitor as appropriate          # Hypertension: Noted on problem list          # DMII: A1C = 6.5 % (Ref range: <5.7 %) within past 6 months       # Financial/Environmental Concerns: none         Disposition Plan      Expected Discharge Date: 10/03/2024,  3:00 PM  Discharge Delays: Placement - TCU    Discharge Comments: Dispo: TCU  pending cards recs/eval        The patient's care was discussed with the Attending Physician, Dr. Pickard .    Lc Curtis MD  Medicine Service, [facility] TEAM [address] [place] [address]  Center  Securely message with [employer] (more info)  Text page via [employer] Paging/Directory   See signed in provider for up to date coverage information  ______________________________________________________________________    Interval History   Several events on tele overnight concerning for asystole or Vtach. Otherwise, was able to ambulate to the bathroom with help.     Physical Exam   Vital Signs: Temp: 97.8  F (36.6  C) Temp src: Oral BP: (!) 114/92 Pulse: 105   Resp: 18 SpO2: 98 % O2 Device: None (Room air)    Weight: 367 lbs 11.64 oz    General: obese, no acute distress  Eyes: no icterus, no conjunctival injection, no papilledema  Cardiac: irregular rate/rhythm, no murmurs/rubs/gallops. mild pitting edema of bilateral lower legs and feet with lymphedema wraps  Pulmonary: clear to auscultation, no wheezes/rales  Skin: dry scaly skin over legs bilaterally and ecchymoses over knee. Open small abrasion wounds over toes bilaterally  Mental Status: awake and alert    Medical Decision Making       Please see A&P for additional details of medical decision making.      Data     I have personally reviewed the following data over the past 24 hrs:    7.5  \   10.7 (L)   / 401     133 (L) 98 25.2 (H) /  136 (H)   4.1 21 (L) 1.19 (H) \     INR:  2.59 (H) PTT:  N/A   D-dimer:  N/A Fibrinogen:  N/A       Imaging results reviewed over the past 24 hrs:   No results found for this or any previous visit (from the past 24 hour(s)).

## 2024-10-02 NOTE — PLAN OF CARE
Goal Outcome Evaluation:      Plan of Care Reviewed With: patient    Overall Patient Progress: no changeOverall Patient Progress: no change    Outcome Evaluation: HR elevated while working w/ therapy, got up in recliner this shift. Tele monitoring reading A-fib, Cards consult completed. Good UO using urinal at bedside, urine sample sent. 500mL NS fluid bolus given. All wound care dressing CDI. Glucose monitoring discontinued. Patient med ready, tentative plan for TCU discharge.

## 2024-10-02 NOTE — CONSULTS
OPAL [facility]    Cardiology Consult Note-Cards 1      Date of Admission:  9/22/2024  Consult Requested by:   Reason for Consult: Concern for asystole and Ventricular Tachycardia noted on telemetry     Assessment & Plan: ZEHRASL   Clarke Moreira is a 74 year old male admitted on 9/22/2024. He has past medical history of T2DM w/ neuropathy and chronic ulcers, paroxysmal atrial fibrillation (rate controlled with metoprolol), HFpEF, HLD, MDD, and he is on CPAP. He presented from home after two episodes of syncope.     Recommendations:  - Agree with increase of metoprolol succinate to 150 mg daily to better control atrial fibrillation and prevent RVR  - Reviewed telemetry. The episodes of asystole and ventricular tachycardia represent artifact. No other concerning rhythms noted besides multiple episodes of atrial fibrillation with RVR.   - Safe to discharge from cardiology perspective    Concern for asystole, Ventricular tachycardia noted on telemetry   Paroxysmal atrial fibrillation   Primary team noted episodes of asystole and ventricular tachycardia captured on telemetry. Reviewed these strips and determined that they represent artifact. During this review, noted multiple episodes of atrial fibrillation with RVR. We agree with the primary team's plan to increase metoprolol succinate from 100 mg daily to 150 mg daily to attain better rate control. Patient is safe to discharge from cardiology perspective.       The patient's care was discussed with the Attending Physician, Dr. Wright .        Aly JOSEPH [facility]        Cardiology fellow attestation  I reviewed the patient with the medical student and I agree with the aforementioned assessment and plan. The patient was discussed with Dr Wright, cardiology attending.   Garett Live MD PhD  Cardiology fellow  [id number]      ______________________________________________________________________    Chief Complaint   Syncope     History is obtained from the patient    History of Present Illness   Clarke Moreira is a 74 year old male admitted on 9/22/2024. He has past medical history of T2DM w/ neuropathy and chronic ulcers, paroxysmal atrial fibrillation (rate controlled with metoprolol), HFpEF, HLD, MDD, and he is on CPAP. He presented from home after two episodes of syncope.     Today, he denies chest pain, SOB, and dizziness. He expressed concern regarding transfer to the TCU today after receiving his dose of torsemide with having to urinate during transport. Relayed these concerns to the primary team.       Past Medical History    Past Medical History:   Diagnosis Date    Atrial fibrillation (H)     Basal cell carcinoma     Chronic anticoagulation     Diastolic heart failure (H)     Dyslipidemia     Essential hypertension     Morbid obesity (H)     Sleep-disordered breathing     Type 2 diabetes mellitus (H)        Past Surgical History   Past Surgical History:   Procedure Laterality Date    BIOPSY OF SKIN LESION      MOHS MICROGRAPHIC PROCEDURE      PICC INSERTION Right 09/24/2017    5fr TL Bard PICC, 51cm (1cm external) in the R medial brachial vein w/ tip in the SVC.       Medications   I have reviewed this patient's current medications         Physical Exam   Vital Signs: Temp: 97.8  F (36.6  C) Temp src: Oral BP: (!) 114/92 Pulse: 105   Resp: 18 SpO2: 98 % O2 Device: None (Room air)    Weight: 367 lbs 11.64 oz          Medical Decision Making             Data     I have personally reviewed the following data over the past 24 hrs:    7.5  \   10.7 (L)   / 401     133 (L) 98 25.2 (H) /  170 (H)   4.1 21 (L) 1.19 (H) \     INR:  2.59 (H) PTT:  N/A   D-dimer:  N/A Fibrinogen:  N/A       Imaging results reviewed over the past 24 hrs:   Reviewed telemetry at work station     Physician Attestation   I saw this patient and agree  with the resident/fellow's findings and plan of care as documented in the note.      Key findings:   Telemetry indicated AF with RVR   No VT (artifact)  No asystole (artifact)  Agree with increasing the metoprolol dose to address the tachyarrhythmias    Carolina Wright MD  Date of Service (when I saw the patient): October 2, 2024

## 2024-10-02 NOTE — PROGRESS NOTES
Care Management Follow Up    Length of Stay (days): 10    Expected Discharge Date: 10/02/2024     Concerns to be Addressed: discharge planning, home safety     Patient plan of care discussed at interdisciplinary rounds: Yes    Anticipated Discharge Disposition: Transitional Care              Anticipated Discharge Services: Transportation Services  Anticipated Discharge DME: None    Patient/family educated on Medicare website which has current facility and service quality ratings: yes  Education Provided on the Discharge Plan: Yes  Patient/Family in Agreement with the Plan: yes    Referrals Placed by CM/SW:    Private pay costs discussed: transportation costs    Discussed  Partnership in Safe Discharge Planning  document with patient/family: Yes:        Handoff Completed: No, handoff not indicated or clinically appropriate    Additional Information:  Writer spoke with provider who shared this pt is not medically ready today. Writer called [employer] and canceled transportation scheduled for today. Writer also notified [facility] that this pt is not medically ready to discharge today.     4:00 PM writer was informed that this pt is now medically ready. Writer attempted to call [place] liaison Kayla Price with no success. SW will need to coordinate discharge tomorrow.     Next Steps: Coordinate discharge.       YAAKOV Garcia   [employer] MERVAT  Ph: [phone]  [facility]  Care Management Department    Securely message me on Vocera

## 2024-10-02 NOTE — PLAN OF CARE
Goal Outcome Evaluation:      Plan of Care Reviewed With: patient    Overall Patient Progress: no changeOverall Patient Progress: no change    Outcome Evaluation: A/Ox4, RA, VSS except for afib and occasional spikes in HR but none were sustained and were only while pt was moving around in bed. Pt did have one episode of prolonged HR due to ambulating to commode but was resolved after large BM and transitioning back to bed. Pt lymph wraps kept on from day shift along with mepilex from [facility] nurse. Tele read Afib. Denied any pain but did state that his lower back was a little uncomfortable. When writer asked if pt wanted help repositioning pt declined. No acute events occured this shift. Please continue with POC.

## 2024-10-03 ENCOUNTER — VIRTUAL VISIT (OUTPATIENT)
Dept: PSYCHOLOGY | Facility: CLINIC | Age: 74
End: 2024-10-03
Payer: COMMERCIAL

## 2024-10-03 VITALS
TEMPERATURE: 98.1 F | HEART RATE: 112 BPM | SYSTOLIC BLOOD PRESSURE: 132 MMHG | DIASTOLIC BLOOD PRESSURE: 64 MMHG | RESPIRATION RATE: 18 BRPM | WEIGHT: 315 LBS | BODY MASS INDEX: 51.29 KG/M2 | OXYGEN SATURATION: 97 %

## 2024-10-03 DIAGNOSIS — F34.1 PERSISTENT DEPRESSIVE DISORDER: Primary | ICD-10-CM

## 2024-10-03 LAB
ANION GAP SERPL CALCULATED.3IONS-SCNC: 11 MMOL/L (ref 7–15)
BUN SERPL-MCNC: 22.4 MG/DL (ref 8–23)
CALCIUM SERPL-MCNC: 9.4 MG/DL (ref 8.8–10.4)
CHLORIDE SERPL-SCNC: 100 MMOL/L (ref 98–107)
CREAT SERPL-MCNC: 1.07 MG/DL (ref 0.67–1.17)
EGFRCR SERPLBLD CKD-EPI 2021: 73 ML/MIN/1.73M2
GLUCOSE SERPL-MCNC: 126 MG/DL (ref 70–99)
HCO3 SERPL-SCNC: 23 MMOL/L (ref 22–29)
HOLD SPECIMEN: NORMAL
INR PPP: 2.49 (ref 0.85–1.15)
PHOSPHATE SERPL-MCNC: 3.6 MG/DL (ref 2.5–4.5)
POTASSIUM SERPL-SCNC: 4.9 MMOL/L (ref 3.4–5.3)
SODIUM SERPL-SCNC: 134 MMOL/L (ref 135–145)

## 2024-10-03 PROCEDURE — 250N000013 HC RX MED GY IP 250 OP 250 PS 637

## 2024-10-03 PROCEDURE — G0008 ADMIN INFLUENZA VIRUS VAC: HCPCS | Performed by: INTERNAL MEDICINE

## 2024-10-03 PROCEDURE — 85610 PROTHROMBIN TIME: CPT | Performed by: INTERNAL MEDICINE

## 2024-10-03 PROCEDURE — 250N000011 HC RX IP 250 OP 636: Performed by: INTERNAL MEDICINE

## 2024-10-03 PROCEDURE — 36415 COLL VENOUS BLD VENIPUNCTURE: CPT | Performed by: INTERNAL MEDICINE

## 2024-10-03 PROCEDURE — 90662 IIV NO PRSV INCREASED AG IM: CPT | Performed by: INTERNAL MEDICINE

## 2024-10-03 PROCEDURE — 84100 ASSAY OF PHOSPHORUS: CPT | Performed by: INTERNAL MEDICINE

## 2024-10-03 PROCEDURE — 99239 HOSP IP/OBS DSCHRG MGMT >30: CPT | Performed by: INTERNAL MEDICINE

## 2024-10-03 PROCEDURE — 99207 PR NO BILLABLE SERVICE THIS VISIT: CPT | Mod: 93 | Performed by: PSYCHOLOGIST

## 2024-10-03 PROCEDURE — 80048 BASIC METABOLIC PNL TOTAL CA: CPT

## 2024-10-03 RX ORDER — CHLORHEXIDINE GLUCONATE ORAL RINSE 1.2 MG/ML
15 SOLUTION DENTAL 2 TIMES DAILY
DISCHARGE
Start: 2024-10-03

## 2024-10-03 RX ORDER — LANOLIN ALCOHOL/MO/W.PET/CERES
CREAM (GRAM) TOPICAL
DISCHARGE
Start: 2024-10-03

## 2024-10-03 RX ORDER — FERROUS SULFATE 325(65) MG
325 TABLET ORAL EVERY OTHER DAY
DISCHARGE
Start: 2024-10-03

## 2024-10-03 RX ORDER — METOPROLOL SUCCINATE 100 MG/1
150 TABLET, EXTENDED RELEASE ORAL DAILY
DISCHARGE
Start: 2024-10-03

## 2024-10-03 RX ORDER — UREA 8.5 G/85G
CREAM TOPICAL 2 TIMES DAILY PRN
DISCHARGE
Start: 2024-10-03

## 2024-10-03 RX ADMIN — B-COMPLEX W/ C & FOLIC ACID TAB 1 TABLET: TAB at 08:32

## 2024-10-03 RX ADMIN — TAMSULOSIN HYDROCHLORIDE 0.4 MG: 0.4 CAPSULE ORAL at 08:32

## 2024-10-03 RX ADMIN — Medication 1 TABLET: at 11:40

## 2024-10-03 RX ADMIN — OXYCODONE HYDROCHLORIDE AND ACETAMINOPHEN 1000 MG: 500 TABLET ORAL at 08:32

## 2024-10-03 RX ADMIN — Medication 25 MCG: at 08:32

## 2024-10-03 RX ADMIN — FERROUS SULFATE TAB 325 MG (65 MG ELEMENTAL FE) 325 MG: 325 (65 FE) TAB at 11:40

## 2024-10-03 RX ADMIN — POTASSIUM CHLORIDE 40 MEQ: 750 TABLET, EXTENDED RELEASE ORAL at 08:32

## 2024-10-03 RX ADMIN — SPIRONOLACTONE 50 MG: 50 TABLET ORAL at 08:32

## 2024-10-03 RX ADMIN — ATORVASTATIN CALCIUM 40 MG: 40 TABLET, FILM COATED ORAL at 08:32

## 2024-10-03 RX ADMIN — METFORMIN HYDROCHLORIDE 500 MG: 500 TABLET, FILM COATED ORAL at 08:32

## 2024-10-03 RX ADMIN — CHLORHEXIDINE GLUCONATE 0.12% ORAL RINSE 15 ML: 1.2 LIQUID ORAL at 08:32

## 2024-10-03 RX ADMIN — INFLUENZA A VIRUS A/VICTORIA/4897/2022 IVR-238 (H1N1) ANTIGEN (FORMALDEHYDE INACTIVATED), INFLUENZA A VIRUS A/CALIFORNIA/122/2022 SAN-022 (H3N2) ANTIGEN (FORMALDEHYDE INACTIVATED), AND INFLUENZA B VIRUS B/MICHIGAN/01/2021 ANTIGEN (FORMALDEHYDE INACTIVATED) 0.5 ML: 60; 60; 60 INJECTION, SUSPENSION INTRAMUSCULAR at 09:39

## 2024-10-03 RX ADMIN — METOPROLOL SUCCINATE 150 MG: 50 TABLET, EXTENDED RELEASE ORAL at 08:32

## 2024-10-03 RX ADMIN — LEVOTHYROXINE SODIUM 175 MCG: 0.05 TABLET ORAL at 08:32

## 2024-10-03 ASSESSMENT — ACTIVITIES OF DAILY LIVING (ADL)
ADLS_ACUITY_SCORE: 34
ADLS_ACUITY_SCORE: 38
ADLS_ACUITY_SCORE: 34
ADLS_ACUITY_SCORE: 38
ADLS_ACUITY_SCORE: 34
ADLS_ACUITY_SCORE: 38
ADLS_ACUITY_SCORE: 34
ADLS_ACUITY_SCORE: 34
ADLS_ACUITY_SCORE: 38

## 2024-10-03 NOTE — PLAN OF CARE
Physical Therapy Discharge Summary    Reason for therapy discharge:    Discharged to transitional care facility.    Progress towards therapy goal(s). See goals on Care Plan in [facility] electronic health record for goal details.  Goals partially met.  Barriers to achieving goals:   discharge from facility.    Therapy recommendation(s):    Continued therapy is recommended.  Rationale/Recommendations:  Recommend ongoing PT at TCU in order to progress safety and IND with functional mobility prior to returning home..

## 2024-10-03 NOTE — PLAN OF CARE
Goal Outcome Evaluation:      Plan of Care Reviewed With: patient    Overall Patient Progress: no changeOverall Patient Progress: no change    Outcome Evaluation: A/Ox4, RA, VSS. Pt slept most of writers shift. Was in positive mood when awake. Tele reading AFib. Continues to use bedside urinal and calls when needing to be empty. Per notes, pt is set to discharge today 10/3 to TCU. No acute events occured this shift. Continue with POC.

## 2024-10-03 NOTE — PLAN OF CARE
Occupational Therapy Discharge Summary    Reason for therapy discharge:    Discharged to transitional care facility.    Progress towards therapy goal(s). See goals on Care Plan in [facility] electronic health record for goal details.  Goals not met.  Barriers to achieving goals:   discharge from facility.    Therapy recommendation(s):    Continued therapy is recommended.  Rationale/Recommendations:  to increase I/ADL IND and safety prior to return home.

## 2024-10-03 NOTE — PLAN OF CARE
Discharge to: TCU  Transportation: [facility] stretcher transportation   Time: 1500  Prescriptions: no new prescriptions at this time  Belongings: all belongings accounted for and sent w/ the patient    -if applicable return home meds from inpatient pharmacy: n/a  Access: PIV removed  Care plan and education discontinued: yes  Paperwork:   AVS and packet printed and sent w/ patient. TCU called and nurse to nurse report was given to mallory. Flu shot administered this morning, vaccine education information sheet printed and given to patient along w/ a copy of vaccine record faxed to facility per nurse request. Education provided and all questions answered at this time

## 2024-10-03 NOTE — PROGRESS NOTES
"8:30-8:45  Called Clarke to check-in. We were originally scheduled for an appointment today, but he has been in the hospital.  Clarke states he is feeling much better. Very grateful for the wonderful care he has been receiving at the hospital.  Anxiety has been higher as he tries to make sense of what the plan will be going forward. Wishes more information would be shared with him about this.  Also has some worries about the status of things at his apartment.  He was taken out by ambulance on 9/22. He thinks his answering machine was disconnected at that time as his home phone just rings and the machine doesn't .  His ipad is plugged in in his apartment and he worries it may be \"frying\" his ipad to be plugged in this long. He does not have a cell phone so he doesn't really have a way to contact people right now.  Would like to get a hold of someone at his apartment building in the office, but office at primarily set things up to be done through email, etc.  Will talk to our  to see if he has some thoughts/ideas how to help with these things.  Clarke will call the clinic back when he is in the TCU unit and we will figure out our next appt at that time.   "

## 2024-10-03 NOTE — PROGRESS NOTES
Care Management Discharge Note    Discharge Date: 10/03/2024       Discharge Disposition: Transitional Care    Discharge Services: Transportation Services    Discharge DME: None    Discharge Transportation: agency    Private pay costs discussed: transportation costs    Does the patient's insurance plan have a 3 day qualifying hospital stay waiver?  No    PAS Confirmation Code:  [id number]  Patient/family educated on Medicare website which has current facility and service quality ratings: yes    Education Provided on the Discharge Plan: Yes  Persons Notified of Discharge Plans: Patient, [facility] liaison, provider, nursing staff  Patient/Family in Agreement with the Plan: yes    Handoff Referral Completed: Yes, [facility] PCP: Internal handoff referral completed    Additional Information:  Akin Price called MERVAT asking if the patient will be discharge to [facility] today    Primary team confirmed the patient is medically read to discharge to TCU today.    MERVAT called [email or web address] and arranged stretcher transportation (bariatric needs) with a pickup window of [phone]    MERVAT called and updated Kayla and provided the time of transport    SW updated the primary team and bedside nurse.    MERVAT attempted to  update the patient a bedside however he was receiving cares at that time.    MERVAT completed updated PCS form, faxed it to billing, and placed completed document in patient's chart    Resources for household organization, medical assistance, and other in home services placed in discharge instructions.    @1001 MERVAT sent discharge order to [facility] samaniego via Epic in basket     @1024 MERVAT sent Kayla a copy of the patient's flu shot immunization pre the facility nurses request.     Accepted  [facility]  [address] [place] [address]  -[facility] Referral  Liaison: Kayla Price  P: [phone]  F: [phone]  Nurse to nurse: [phone]    YAAKOV Esquivel   [address]   Office: [phone]  [email or web address]  Securely message with Enrique (Search Name or 7C Med Surg [phone] SW)  Text page via [facility] Paging/Directory   See signed in provider for up to date coverage information  Social Work & Care Management Department

## 2024-10-04 ENCOUNTER — LAB REQUISITION (OUTPATIENT)
Dept: LAB | Facility: CLINIC | Age: 74
End: 2024-10-04
Payer: COMMERCIAL

## 2024-10-04 ENCOUNTER — DOCUMENTATION ONLY (OUTPATIENT)
Dept: ANTICOAGULATION | Facility: CLINIC | Age: 74
End: 2024-10-04
Payer: COMMERCIAL

## 2024-10-04 DIAGNOSIS — R94.6 ABNORMAL RESULTS OF THYROID FUNCTION STUDIES: ICD-10-CM

## 2024-10-04 DIAGNOSIS — Z13.228 ENCOUNTER FOR SCREENING FOR OTHER METABOLIC DISORDERS: ICD-10-CM

## 2024-10-04 DIAGNOSIS — R73.09 OTHER ABNORMAL GLUCOSE: ICD-10-CM

## 2024-10-04 DIAGNOSIS — R79.1 ABNORMAL COAGULATION PROFILE: ICD-10-CM

## 2024-10-04 DIAGNOSIS — I48.0 PAROXYSMAL ATRIAL FIBRILLATION (H): ICD-10-CM

## 2024-10-04 DIAGNOSIS — Z79.01 LONG TERM (CURRENT) USE OF ANTICOAGULANTS: ICD-10-CM

## 2024-10-04 DIAGNOSIS — R79.9 ABNORMAL FINDING OF BLOOD CHEMISTRY, UNSPECIFIED: ICD-10-CM

## 2024-10-04 DIAGNOSIS — I48.20 CHRONIC ATRIAL FIBRILLATION (H): Primary | ICD-10-CM

## 2024-10-04 DIAGNOSIS — Z13.220 ENCOUNTER FOR SCREENING FOR LIPOID DISORDERS: ICD-10-CM

## 2024-10-04 NOTE — PROGRESS NOTES
ANTICOAGULATION  MANAGEMENT: Discharge Review    Clarke Moreira chart reviewed for anticoagulation continuity of care    Hospital Admission on 9/22 - 10/3/2024 for 2 presyncopal episodes and weakness.    - falls frequently, severe sepsis,  suicidal ideation.   - hyponatremia, Heart failure, Anemia    Discharge disposition: TCU    Results:    Recent labs: (last 7 days)     09/28/24  0604 09/29/24  1144 09/30/24  0024 10/01/24  1103 10/01/24  2356 10/02/24  0640 10/03/24  0650   INR 1.68* 1.90* 1.86* 2.48* 2.41* 2.59* 2.49*     Anticoagulation inpatient management:     anticoagulation calendar updated with inpatient dosing    Anticoagulation discharge instructions:     Warfarin dosing: home regimen continued   Bridging: No   INR goal change: No      Medication changes affecting anticoagulation: Yes:    - started: Ferrous Sulfate 325mg q tab every other day.   - changes: Metoprolol Succinate 100mg - take 1 &1/2 tablets daily.    Additional factors affecting anticoagulation: No     PLAN     Agree with discharge plan for follow up:   Follow Up and recommended labs and tests  )BMP, CBC w/ plts and diff, magnesium on 10/7/24.     Follow up with TCU PCP  for lab check and as needed      ACC will resume monitoring upon discharge from TCU    No adjustment to Anticoagulation Calendar was required    Mirna Mcknight RN  10/4/2024  Anticoagulation Clinic  [employer] for routing messages: benjamin LOVE'S  ACC patient phone line: [phone]

## 2024-10-05 ENCOUNTER — LAB REQUISITION (OUTPATIENT)
Dept: LAB | Facility: CLINIC | Age: 74
End: 2024-10-05
Payer: COMMERCIAL

## 2024-10-05 DIAGNOSIS — R79.1 ABNORMAL COAGULATION PROFILE: ICD-10-CM

## 2024-10-05 PROCEDURE — 36415 COLL VENOUS BLD VENIPUNCTURE: CPT | Mod: ORL | Performed by: PSYCHIATRY & NEUROLOGY

## 2024-10-05 PROCEDURE — 85610 PROTHROMBIN TIME: CPT | Mod: ORL | Performed by: PSYCHIATRY & NEUROLOGY

## 2024-10-05 PROCEDURE — P9604 ONE-WAY ALLOW PRORATED TRIP: HCPCS | Mod: ORL | Performed by: PSYCHIATRY & NEUROLOGY

## 2024-10-06 LAB — INR PPP: 2.39 (ref 0.85–1.15)

## 2024-10-07 LAB
ANION GAP SERPL CALCULATED.3IONS-SCNC: 13 MMOL/L (ref 7–15)
BUN SERPL-MCNC: 13.3 MG/DL (ref 8–23)
CALCIUM SERPL-MCNC: 9.6 MG/DL (ref 8.8–10.4)
CHLORIDE SERPL-SCNC: 103 MMOL/L (ref 98–107)
CHOLEST SERPL-MCNC: 102 MG/DL
CREAT SERPL-MCNC: 1.01 MG/DL (ref 0.67–1.17)
EGFRCR SERPLBLD CKD-EPI 2021: 78 ML/MIN/1.73M2
ERYTHROCYTE [DISTWIDTH] IN BLOOD BY AUTOMATED COUNT: 17.6 % (ref 10–15)
EST. AVERAGE GLUCOSE BLD GHB EST-MCNC: 131 MG/DL
FASTING STATUS PATIENT QL REPORTED: ABNORMAL
GLUCOSE SERPL-MCNC: 111 MG/DL (ref 70–99)
HBA1C MFR BLD: 6.2 %
HCO3 SERPL-SCNC: 20 MMOL/L (ref 22–29)
HCT VFR BLD AUTO: 37.4 % (ref 40–53)
HDLC SERPL-MCNC: 34 MG/DL
HGB BLD-MCNC: 10.8 G/DL (ref 13.3–17.7)
LDLC SERPL CALC-MCNC: 37 MG/DL
MCH RBC QN AUTO: 23.3 PG (ref 26.5–33)
MCHC RBC AUTO-ENTMCNC: 28.9 G/DL (ref 31.5–36.5)
MCV RBC AUTO: 81 FL (ref 78–100)
NONHDLC SERPL-MCNC: 68 MG/DL
PLATELET # BLD AUTO: 371 10E3/UL (ref 150–450)
POTASSIUM SERPL-SCNC: 5.1 MMOL/L (ref 3.4–5.3)
RBC # BLD AUTO: 4.64 10E6/UL (ref 4.4–5.9)
SODIUM SERPL-SCNC: 136 MMOL/L (ref 135–145)
TRIGL SERPL-MCNC: 154 MG/DL
TSH SERPL DL<=0.005 MIU/L-ACNC: 0.93 UIU/ML (ref 0.3–4.2)
WBC # BLD AUTO: 10.5 10E3/UL (ref 4–11)

## 2024-10-07 PROCEDURE — 36415 COLL VENOUS BLD VENIPUNCTURE: CPT | Mod: ORL

## 2024-10-07 PROCEDURE — 85027 COMPLETE CBC AUTOMATED: CPT | Mod: ORL

## 2024-10-07 PROCEDURE — 80048 BASIC METABOLIC PNL TOTAL CA: CPT | Mod: ORL

## 2024-10-07 PROCEDURE — 80061 LIPID PANEL: CPT | Mod: ORL

## 2024-10-07 PROCEDURE — P9603 ONE-WAY ALLOW PRORATED MILES: HCPCS | Mod: ORL

## 2024-10-07 PROCEDURE — 84443 ASSAY THYROID STIM HORMONE: CPT | Mod: ORL

## 2024-10-07 PROCEDURE — 83036 HEMOGLOBIN GLYCOSYLATED A1C: CPT | Mod: ORL

## 2024-10-08 ENCOUNTER — LAB REQUISITION (OUTPATIENT)
Dept: LAB | Facility: CLINIC | Age: 74
End: 2024-10-08
Payer: COMMERCIAL

## 2024-10-08 DIAGNOSIS — I48.0 PAROXYSMAL ATRIAL FIBRILLATION (H): ICD-10-CM

## 2024-10-09 NOTE — PROGRESS NOTES
"BP 94/61 (BP Location: Left arm)   Pulse 107   Temp 98.1  F (36.7  C) (Oral)   Resp 16   Ht 1.803 m (5' 11\")   Wt (!) 168.9 kg (372 lb 5.7 oz)   SpO2 100%   BMI 51.93 kg/m       Neuro: A&Ox4.   Cardiac: A-Fib 80s-200s. Pt tends to go up to 200s with activity. MD aware. Pt received bolus 500cc.  Respiratory: Sating 99% on 2L.  GI/: Adequate urine output. No BM this shift.   Diet/appetite: Tolerating  diet. Denies nausea.  Activity: Assist of 2-edge of bed.Ceiling lift.  Pain: At acceptable level on current regimen.    Skin: Black scabs to Bilateral great toes with redness to surrounding toes. R-toe with dressing.  LDA's: PIV x 2,    Cont , notify MD of any changes.         " Cleanse laceration site daily with warm Dial soap and water apply bacitracin ointment.  Do not use peroxide or alcohol.  Look for signs symptoms of infection.  Plan on suture removal in 7 days by your primary care doctor.  Take Motrin or Tylenol for pain relief    Perform cognitive rest for 1 week, avoid excess computer, cell phone and screen time.  Apply ice to help with bruising and additional pain relief to left side of face.

## 2024-10-10 LAB — INR PPP: 2.5 (ref 0.85–1.15)

## 2024-10-10 PROCEDURE — P9603 ONE-WAY ALLOW PRORATED MILES: HCPCS | Mod: ORL | Performed by: NURSE PRACTITIONER

## 2024-10-10 PROCEDURE — 36415 COLL VENOUS BLD VENIPUNCTURE: CPT | Mod: ORL | Performed by: NURSE PRACTITIONER

## 2024-10-10 PROCEDURE — 85610 PROTHROMBIN TIME: CPT | Mod: ORL | Performed by: NURSE PRACTITIONER

## 2024-10-16 ENCOUNTER — TELEPHONE (OUTPATIENT)
Dept: FAMILY MEDICINE | Facility: CLINIC | Age: 74
End: 2024-10-16
Payer: COMMERCIAL

## 2024-10-16 NOTE — TELEPHONE ENCOUNTER
RN called to relay provider message that he will follow pt and sign orders. Stephanie verbalized understanding.      Hope Christie RN

## 2024-10-16 NOTE — TELEPHONE ENCOUNTER
[facility] phone call message- general phone call:    Reason for call: Stephanie is requesting a call back from  to confirm if he will be following and signing plan of care for the patient from [employer].    Return call needed: Yes    OK to leave a message on voice mail? Yes    Primary language: English      needed? No    Call taken on October 16, 2024 at 11:24 AM by Domenica Lawrence

## 2024-10-18 ENCOUNTER — TELEPHONE (OUTPATIENT)
Dept: ANTICOAGULATION | Facility: CLINIC | Age: 74
End: 2024-10-18
Payer: COMMERCIAL

## 2024-10-18 NOTE — TELEPHONE ENCOUNTER
ANTICOAGULATION     Clarke Moreira is overdue for an INR check.     Was unable to reach patient and was unable to leave a voicemail. If patient calls, please schedule INR check as soon as possible.     Mirna Mcknight RN  10/18/2024  Anticoagulation Clinic  [facility] for routing messages: benjamin LOVE'S  ACC patient phone line: [phone]

## 2024-10-19 ENCOUNTER — LAB REQUISITION (OUTPATIENT)
Dept: LAB | Facility: CLINIC | Age: 74
End: 2024-10-19
Payer: COMMERCIAL

## 2024-10-19 DIAGNOSIS — Z79.01 LONG TERM (CURRENT) USE OF ANTICOAGULANTS: ICD-10-CM

## 2024-10-22 ENCOUNTER — TELEPHONE (OUTPATIENT)
Dept: FAMILY MEDICINE | Facility: CLINIC | Age: 74
End: 2024-10-22
Payer: COMMERCIAL

## 2024-10-22 ENCOUNTER — MEDICAL CORRESPONDENCE (OUTPATIENT)
Dept: HEALTH INFORMATION MANAGEMENT | Facility: CLINIC | Age: 74
End: 2024-10-22
Payer: COMMERCIAL

## 2024-10-22 DIAGNOSIS — E11.9 TYPE 2 DIABETES MELLITUS WITHOUT COMPLICATION, WITHOUT LONG-TERM CURRENT USE OF INSULIN (H): ICD-10-CM

## 2024-10-22 DIAGNOSIS — I10 ESSENTIAL HYPERTENSION: ICD-10-CM

## 2024-10-22 DIAGNOSIS — I48.0 PAROXYSMAL ATRIAL FIBRILLATION (H): ICD-10-CM

## 2024-10-22 DIAGNOSIS — D64.9 ANEMIA, UNSPECIFIED TYPE: ICD-10-CM

## 2024-10-22 DIAGNOSIS — I50.32 CHRONIC HEART FAILURE WITH PRESERVED EJECTION FRACTION (H): ICD-10-CM

## 2024-10-22 DIAGNOSIS — K59.00 CONSTIPATION, UNSPECIFIED CONSTIPATION TYPE: ICD-10-CM

## 2024-10-22 RX ORDER — FERROUS SULFATE 325(65) MG
325 TABLET ORAL EVERY OTHER DAY
Qty: 60 TABLET | Refills: 1 | Status: SHIPPED | OUTPATIENT
Start: 2024-10-22

## 2024-10-22 RX ORDER — ASPIRIN 81 MG/1
81 TABLET, CHEWABLE ORAL DAILY
Qty: 200 TABLET | Refills: 2 | Status: SHIPPED | OUTPATIENT
Start: 2024-10-22

## 2024-10-22 RX ORDER — AMOXICILLIN 250 MG
CAPSULE ORAL
Qty: 180 TABLET | Refills: 0 | Status: SHIPPED | OUTPATIENT
Start: 2024-10-22

## 2024-10-22 RX ORDER — METOPROLOL SUCCINATE 100 MG/1
150 TABLET, EXTENDED RELEASE ORAL DAILY
Qty: 135 TABLET | Refills: 1 | Status: ON HOLD | OUTPATIENT
Start: 2024-10-22 | End: 2024-11-01

## 2024-10-22 NOTE — TELEPHONE ENCOUNTER
Returned patient's call, he was released yesterday from [facility] [phone], he states his medications were supposed to be sent in to his pharmacy but he does not know which meds or changes to his medications.    I called [facility] and left a message    Teodora Webber RN

## 2024-10-22 NOTE — TELEPHONE ENCOUNTER
Facility called back they said that delete the med list once it is printed out for the patient.    I called back the patient and we reviewed his medication list, I then called [facility] and asked what is available for refill, patient received multiple scripts for 90 days on 9/3/24    Patient has a few new prescriptions that need to be written    Metoprolol Succinate ER 100mg 1.5 tab daily  Warfarin 5mg - 2 tablets daily at 1800 - INR was checked yesterday per patient.    Can you also check on the torsemide, it shows discontinued in the chart on 10/3/24 but is still listed as an active medication on med list.      Teodora Webber RN

## 2024-10-22 NOTE — TELEPHONE ENCOUNTER
[facility] phone call message- general phone call:    Reason for call: The patient was just release from \[facility]\"" and states there were some changes on his medications (he doesn't know which ones) and would like to speak to someone about it.    Return call needed: Yes    OK to leave a message on voice mail? Yes    Primary language: English      needed? No    Call taken on October 22, 2024 at 9:58 AM by Meera Calderon

## 2024-10-23 NOTE — TELEPHONE ENCOUNTER
ANTICOAGULATION     Clarke Moreira is overdue for an INR check.     Spoke with clarke and scheduled lab appointment on 11/6   - INR was 2.4    - just discharge from TCU (non-[facility])   - has follow-up at [facility] on 11/6/24 and will add INR during visit.    Mirna Mcknight, RN  10/23/2024  Anticoagulation Clinic  [facility] for routing messages: benjamin LOVE'S  ACC patient phone line: [phone]

## 2024-10-25 ENCOUNTER — APPOINTMENT (OUTPATIENT)
Dept: GENERAL RADIOLOGY | Facility: CLINIC | Age: 74
End: 2024-10-25
Attending: EMERGENCY MEDICINE
Payer: COMMERCIAL

## 2024-10-25 ENCOUNTER — APPOINTMENT (OUTPATIENT)
Dept: CT IMAGING | Facility: CLINIC | Age: 74
End: 2024-10-25
Attending: EMERGENCY MEDICINE
Payer: COMMERCIAL

## 2024-10-25 ENCOUNTER — APPOINTMENT (OUTPATIENT)
Dept: CARDIOLOGY | Facility: CLINIC | Age: 74
End: 2024-10-25
Attending: PHYSICIAN ASSISTANT
Payer: COMMERCIAL

## 2024-10-25 ENCOUNTER — HOSPITAL ENCOUNTER (OUTPATIENT)
Facility: CLINIC | Age: 74
Setting detail: OBSERVATION
Discharge: HOME | End: 2024-10-30
Attending: EMERGENCY MEDICINE | Admitting: STUDENT IN AN ORGANIZED HEALTH CARE EDUCATION/TRAINING PROGRAM
Payer: COMMERCIAL

## 2024-10-25 DIAGNOSIS — N17.9 AKI (ACUTE KIDNEY INJURY) (H): ICD-10-CM

## 2024-10-25 DIAGNOSIS — M54.50 ACUTE MIDLINE LOW BACK PAIN WITHOUT SCIATICA: ICD-10-CM
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
## 2024-10-25 DIAGNOSIS — R53.1 WEAKNESS: ICD-10-CM

## 2024-10-25 DIAGNOSIS — R29.6 FREQUENT FALLS: Primary | ICD-10-CM

## 2024-10-25 DIAGNOSIS — R79.1 SUBTHERAPEUTIC INTERNATIONAL NORMALIZED RATIO (INR): ICD-10-CM
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
## 2024-10-25 DIAGNOSIS — Z79.01 LONG TERM (CURRENT) USE OF ANTICOAGULANTS: ICD-10-CM

## 2024-10-25 DIAGNOSIS — M25.551 HIP PAIN, RIGHT: ICD-10-CM

## 2024-10-25 DIAGNOSIS — M25.561 ACUTE PAIN OF RIGHT KNEE: ICD-10-CM

## 2024-10-25 LAB
ALBUMIN SERPL BCG-MCNC: 3.6 G/DL (ref 3.5–5.2)
ALBUMIN UR-MCNC: 10 MG/DL
ALP SERPL-CCNC: 119 U/L (ref 40–150)
ALT SERPL W P-5'-P-CCNC: 12 U/L (ref 0–70)
ANION GAP SERPL CALCULATED.3IONS-SCNC: 20 MMOL/L (ref 7–15)
APPEARANCE UR: CLEAR
AST SERPL W P-5'-P-CCNC: 27 U/L (ref 0–45)
ATRIAL RATE - MUSE: NORMAL BPM
BASOPHILS # BLD AUTO: 0 10E3/UL (ref 0–0.2)
BASOPHILS NFR BLD AUTO: 1 %
BILIRUB SERPL-MCNC: 0.7 MG/DL
BILIRUB UR QL STRIP: NEGATIVE
BUN SERPL-MCNC: 23.1 MG/DL (ref 8–23)
CALCIUM SERPL-MCNC: 9.3 MG/DL (ref 8.8–10.4)
CHLORIDE SERPL-SCNC: 94 MMOL/L (ref 98–107)
CK SERPL-CCNC: 75 U/L (ref 39–308)
COLOR UR AUTO: YELLOW
CREAT SERPL-MCNC: 1.77 MG/DL (ref 0.67–1.17)
DIASTOLIC BLOOD PRESSURE - MUSE: NORMAL MMHG
EGFRCR SERPLBLD CKD-EPI 2021: 40 ML/MIN/1.73M2
EOSINOPHIL # BLD AUTO: 0 10E3/UL (ref 0–0.7)
EOSINOPHIL NFR BLD AUTO: 0 %
ERYTHROCYTE [DISTWIDTH] IN BLOOD BY AUTOMATED COUNT: 18 % (ref 10–15)
GLUCOSE BLDC GLUCOMTR-MCNC: 100 MG/DL (ref 70–99)
GLUCOSE BLDC GLUCOMTR-MCNC: 116 MG/DL (ref 70–99)
GLUCOSE BLDC GLUCOMTR-MCNC: 136 MG/DL (ref 70–99)
GLUCOSE SERPL-MCNC: 166 MG/DL (ref 70–99)
GLUCOSE UR STRIP-MCNC: NEGATIVE MG/DL
HCO3 SERPL-SCNC: 21 MMOL/L (ref 22–29)
HCT VFR BLD AUTO: 40.2 % (ref 40–53)
HGB BLD-MCNC: 11.9 G/DL (ref 13.3–17.7)
HGB UR QL STRIP: NEGATIVE
HOLD SPECIMEN: NORMAL
HYALINE CASTS: 1 /LPF
IMM GRANULOCYTES # BLD: 0 10E3/UL
IMM GRANULOCYTES NFR BLD: 0 %
INR PPP: 3.48 (ref 0.85–1.15)
INR PPP: 3.59 (ref 0.85–1.15)
INTERPRETATION ECG - MUSE: NORMAL
KETONES UR STRIP-MCNC: ABNORMAL MG/DL
LEUKOCYTE ESTERASE UR QL STRIP: NEGATIVE
LVEF ECHO: NORMAL
LYMPHOCYTES # BLD AUTO: 1.4 10E3/UL (ref 0.8–5.3)
LYMPHOCYTES NFR BLD AUTO: 19 %
MCH RBC QN AUTO: 22.9 PG (ref 26.5–33)
MCHC RBC AUTO-ENTMCNC: 29.6 G/DL (ref 31.5–36.5)
MCV RBC AUTO: 77 FL (ref 78–100)
MONOCYTES # BLD AUTO: 0.8 10E3/UL (ref 0–1.3)
MONOCYTES NFR BLD AUTO: 10 %
MUCOUS THREADS #/AREA URNS LPF: PRESENT /LPF
NEUTROPHILS # BLD AUTO: 5 10E3/UL (ref 1.6–8.3)
NEUTROPHILS NFR BLD AUTO: 69 %
NITRATE UR QL: NEGATIVE
NRBC # BLD AUTO: 0 10E3/UL
NRBC BLD AUTO-RTO: 0 /100
P AXIS - MUSE: NORMAL DEGREES
PH UR STRIP: 5.5 [PH] (ref 5–7)
PLATELET # BLD AUTO: 369 10E3/UL (ref 150–450)
POTASSIUM SERPL-SCNC: 3.4 MMOL/L (ref 3.4–5.3)
PR INTERVAL - MUSE: NORMAL MS
PROT SERPL-MCNC: 7.1 G/DL (ref 6.4–8.3)
QRS DURATION - MUSE: 96 MS
QT - MUSE: 392 MS
QTC - MUSE: 466 MS
R AXIS - MUSE: 15 DEGREES
RBC # BLD AUTO: 5.2 10E6/UL (ref 4.4–5.9)
RBC URINE: <1 /HPF
SODIUM SERPL-SCNC: 135 MMOL/L (ref 135–145)
SP GR UR STRIP: 1.01 (ref 1–1.03)
SYSTOLIC BLOOD PRESSURE - MUSE: NORMAL MMHG
T AXIS - MUSE: 18 DEGREES
UROBILINOGEN UR STRIP-MCNC: NORMAL MG/DL
VENTRICULAR RATE- MUSE: 85 BPM
WBC # BLD AUTO: 7.2 10E3/UL (ref 4–11)
WBC URINE: <1 /HPF

## 2024-10-25 PROCEDURE — 85025 COMPLETE CBC W/AUTO DIFF WBC: CPT | Performed by: EMERGENCY MEDICINE

## 2024-10-25 PROCEDURE — 73560 X-RAY EXAM OF KNEE 1 OR 2: CPT | Mod: 26 | Performed by: RADIOLOGY

## 2024-10-25 PROCEDURE — 87040 BLOOD CULTURE FOR BACTERIA: CPT | Performed by: PHYSICIAN ASSISTANT

## 2024-10-25 PROCEDURE — 93005 ELECTROCARDIOGRAM TRACING: CPT

## 2024-10-25 PROCEDURE — 72131 CT LUMBAR SPINE W/O DYE: CPT

## 2024-10-25 PROCEDURE — 999N000208 ECHOCARDIOGRAM COMPLETE

## 2024-10-25 PROCEDURE — G0378 HOSPITAL OBSERVATION PER HR: HCPCS

## 2024-10-25 PROCEDURE — 81001 URINALYSIS AUTO W/SCOPE: CPT | Performed by: PHYSICIAN ASSISTANT

## 2024-10-25 PROCEDURE — 82962 GLUCOSE BLOOD TEST: CPT

## 2024-10-25 PROCEDURE — 73502 X-RAY EXAM HIP UNI 2-3 VIEWS: CPT

## 2024-10-25 PROCEDURE — 72128 CT CHEST SPINE W/O DYE: CPT

## 2024-10-25 PROCEDURE — 36415 COLL VENOUS BLD VENIPUNCTURE: CPT | Performed by: EMERGENCY MEDICINE

## 2024-10-25 PROCEDURE — 82550 ASSAY OF CK (CPK): CPT | Performed by: PHYSICIAN ASSISTANT

## 2024-10-25 PROCEDURE — 96361 HYDRATE IV INFUSION ADD-ON: CPT

## 2024-10-25 PROCEDURE — 96360 HYDRATION IV INFUSION INIT: CPT | Mod: XU | Performed by: EMERGENCY MEDICINE

## 2024-10-25 PROCEDURE — 99223 1ST HOSP IP/OBS HIGH 75: CPT | Mod: FS | Performed by: STUDENT IN AN ORGANIZED HEALTH CARE EDUCATION/TRAINING PROGRAM

## 2024-10-25 PROCEDURE — 72131 CT LUMBAR SPINE W/O DYE: CPT | Mod: 26 | Performed by: RADIOLOGY

## 2024-10-25 PROCEDURE — 73501 X-RAY EXAM HIP UNI 1 VIEW: CPT | Mod: 26 | Performed by: RADIOLOGY

## 2024-10-25 PROCEDURE — 85610 PROTHROMBIN TIME: CPT | Performed by: STUDENT IN AN ORGANIZED HEALTH CARE EDUCATION/TRAINING PROGRAM

## 2024-10-25 PROCEDURE — 258N000003 HC RX IP 258 OP 636: Performed by: EMERGENCY MEDICINE

## 2024-10-25 PROCEDURE — 99285 EMERGENCY DEPT VISIT HI MDM: CPT | Mod: 25 | Performed by: EMERGENCY MEDICINE

## 2024-10-25 PROCEDURE — 73560 X-RAY EXAM OF KNEE 1 OR 2: CPT | Mod: RT

## 2024-10-25 PROCEDURE — 99285 EMERGENCY DEPT VISIT HI MDM: CPT | Performed by: EMERGENCY MEDICINE

## 2024-10-25 PROCEDURE — 70450 CT HEAD/BRAIN W/O DYE: CPT

## 2024-10-25 PROCEDURE — 72128 CT CHEST SPINE W/O DYE: CPT | Mod: 26 | Performed by: RADIOLOGY

## 2024-10-25 PROCEDURE — 36415 COLL VENOUS BLD VENIPUNCTURE: CPT | Performed by: PHYSICIAN ASSISTANT

## 2024-10-25 PROCEDURE — 255N000002 HC RX 255 OP 636: Performed by: INTERNAL MEDICINE

## 2024-10-25 PROCEDURE — 258N000003 HC RX IP 258 OP 636: Performed by: PHYSICIAN ASSISTANT

## 2024-10-25 PROCEDURE — 72125 CT NECK SPINE W/O DYE: CPT | Mod: 26 | Performed by: RADIOLOGY

## 2024-10-25 PROCEDURE — 250N000013 HC RX MED GY IP 250 OP 250 PS 637

## 2024-10-25 PROCEDURE — 82947 ASSAY GLUCOSE BLOOD QUANT: CPT | Performed by: EMERGENCY MEDICINE

## 2024-10-25 PROCEDURE — 93306 TTE W/DOPPLER COMPLETE: CPT | Mod: 26 | Performed by: INTERNAL MEDICINE

## 2024-10-25 PROCEDURE — 72125 CT NECK SPINE W/O DYE: CPT

## 2024-10-25 PROCEDURE — 99207 PR APP CREDIT; MD BILLING SHARED VISIT: CPT | Mod: FS | Performed by: PHYSICIAN ASSISTANT

## 2024-10-25 PROCEDURE — 85610 PROTHROMBIN TIME: CPT | Performed by: EMERGENCY MEDICINE

## 2024-10-25 PROCEDURE — G0463 HOSPITAL OUTPT CLINIC VISIT: HCPCS | Mod: 25

## 2024-10-25 PROCEDURE — 70450 CT HEAD/BRAIN W/O DYE: CPT | Mod: 26 | Performed by: RADIOLOGY

## 2024-10-25 PROCEDURE — 36415 COLL VENOUS BLD VENIPUNCTURE: CPT | Performed by: STUDENT IN AN ORGANIZED HEALTH CARE EDUCATION/TRAINING PROGRAM

## 2024-10-25 PROCEDURE — 250N000013 HC RX MED GY IP 250 OP 250 PS 637: Performed by: PHYSICIAN ASSISTANT

## 2024-10-25 RX ORDER — CALCIUM CARBONATE 500 MG/1
1000 TABLET, CHEWABLE ORAL 4 TIMES DAILY PRN
Status: DISCONTINUED | OUTPATIENT
Start: 2024-10-25 | End: 2024-10-30 | Stop reason: HOSPADM

## 2024-10-25 RX ORDER — ACETAMINOPHEN 650 MG/1
650 SUPPOSITORY RECTAL EVERY 4 HOURS PRN
Status: DISCONTINUED | OUTPATIENT
Start: 2024-10-25 | End: 2024-10-30 | Stop reason: HOSPADM

## 2024-10-25 RX ORDER — TAMSULOSIN HYDROCHLORIDE 0.4 MG/1
0.4 CAPSULE ORAL DAILY
Status: DISCONTINUED | OUTPATIENT
Start: 2024-10-25 | End: 2024-10-25

## 2024-10-25 RX ORDER — METOPROLOL SUCCINATE 100 MG/1
100 TABLET, EXTENDED RELEASE ORAL DAILY
Status: DISCONTINUED | OUTPATIENT
Start: 2024-10-25 | End: 2024-10-30 | Stop reason: HOSPADM

## 2024-10-25 RX ORDER — LEVOTHYROXINE SODIUM 175 UG/1
175 TABLET ORAL
Status: DISCONTINUED | OUTPATIENT
Start: 2024-10-26 | End: 2024-10-30 | Stop reason: HOSPADM

## 2024-10-25 RX ORDER — ASPIRIN 81 MG/1
81 TABLET, CHEWABLE ORAL DAILY
Status: DISCONTINUED | OUTPATIENT
Start: 2024-10-25 | End: 2024-10-30 | Stop reason: HOSPADM

## 2024-10-25 RX ORDER — VITAMIN B COMPLEX
25 TABLET ORAL DAILY
Status: DISCONTINUED | OUTPATIENT
Start: 2024-10-26 | End: 2024-10-30 | Stop reason: HOSPADM

## 2024-10-25 RX ORDER — LIDOCAINE 40 MG/G
CREAM TOPICAL
Status: DISCONTINUED | OUTPATIENT
Start: 2024-10-25 | End: 2024-10-30 | Stop reason: HOSPADM

## 2024-10-25 RX ORDER — AMOXICILLIN 250 MG
1 CAPSULE ORAL 2 TIMES DAILY PRN
Status: DISCONTINUED | OUTPATIENT
Start: 2024-10-25 | End: 2024-10-30 | Stop reason: HOSPADM

## 2024-10-25 RX ORDER — UREA 8.5 G/85G
CREAM TOPICAL 2 TIMES DAILY PRN
Status: DISCONTINUED | OUTPATIENT
Start: 2024-10-25 | End: 2024-10-25

## 2024-10-25 RX ORDER — FERROUS SULFATE 325(65) MG
325 TABLET ORAL EVERY OTHER DAY
Status: DISCONTINUED | OUTPATIENT
Start: 2024-10-25 | End: 2024-10-30 | Stop reason: HOSPADM

## 2024-10-25 RX ORDER — SPIRONOLACTONE 25 MG/1
50 TABLET ORAL DAILY
Status: DISCONTINUED | OUTPATIENT
Start: 2024-10-25 | End: 2024-10-30 | Stop reason: HOSPADM

## 2024-10-25 RX ORDER — SODIUM CHLORIDE, SODIUM LACTATE, POTASSIUM CHLORIDE, CALCIUM CHLORIDE 600; 310; 30; 20 MG/100ML; MG/100ML; MG/100ML; MG/100ML
INJECTION, SOLUTION INTRAVENOUS CONTINUOUS
Status: DISCONTINUED | OUTPATIENT
Start: 2024-10-25 | End: 2024-10-25

## 2024-10-25 RX ORDER — LIDOCAINE 4 G/G
2 PATCH TOPICAL
Status: DISCONTINUED | OUTPATIENT
Start: 2024-10-25 | End: 2024-10-30 | Stop reason: HOSPADM

## 2024-10-25 RX ORDER — ASCORBIC ACID 500 MG
1000 TABLET ORAL DAILY
Status: DISCONTINUED | OUTPATIENT
Start: 2024-10-26 | End: 2024-10-30 | Stop reason: HOSPADM

## 2024-10-25 RX ORDER — AMOXICILLIN 250 MG
1-2 CAPSULE ORAL 2 TIMES DAILY PRN
Status: DISCONTINUED | OUTPATIENT
Start: 2024-10-25 | End: 2024-10-25

## 2024-10-25 RX ORDER — DEXTROSE MONOHYDRATE 25 G/50ML
25-50 INJECTION, SOLUTION INTRAVENOUS
Status: DISCONTINUED | OUTPATIENT
Start: 2024-10-25 | End: 2024-10-30 | Stop reason: HOSPADM

## 2024-10-25 RX ORDER — WARFARIN SODIUM 5 MG/1
5 TABLET ORAL
Status: COMPLETED | OUTPATIENT
Start: 2024-10-25 | End: 2024-10-25

## 2024-10-25 RX ORDER — POTASSIUM CHLORIDE 750 MG/1
40 TABLET, EXTENDED RELEASE ORAL 2 TIMES DAILY
Status: DISCONTINUED | OUTPATIENT
Start: 2024-10-25 | End: 2024-10-27

## 2024-10-25 RX ORDER — ATORVASTATIN CALCIUM 40 MG/1
40 TABLET, FILM COATED ORAL DAILY
Status: DISCONTINUED | OUTPATIENT
Start: 2024-10-25 | End: 2024-10-30 | Stop reason: HOSPADM

## 2024-10-25 RX ORDER — NYSTATIN 100000 U/G
CREAM TOPICAL 2 TIMES DAILY PRN
Status: DISCONTINUED | OUTPATIENT
Start: 2024-10-25 | End: 2024-10-30 | Stop reason: HOSPADM

## 2024-10-25 RX ORDER — CHLORHEXIDINE GLUCONATE ORAL RINSE 1.2 MG/ML
15 SOLUTION DENTAL 2 TIMES DAILY
Status: DISCONTINUED | OUTPATIENT
Start: 2024-10-25 | End: 2024-10-30

## 2024-10-25 RX ORDER — MULTIPLE VITAMINS W/ MINERALS TAB 9MG-400MCG
1 TAB ORAL DAILY
Status: DISCONTINUED | OUTPATIENT
Start: 2024-10-25 | End: 2024-10-30 | Stop reason: HOSPADM

## 2024-10-25 RX ORDER — UREA 8.5 G/85G
CREAM TOPICAL 2 TIMES DAILY PRN
Status: DISCONTINUED | OUTPATIENT
Start: 2024-10-25 | End: 2024-10-30

## 2024-10-25 RX ORDER — SALIVA STIMULANT COMB. NO.3
1 SPRAY, NON-AEROSOL (ML) MUCOUS MEMBRANE 4 TIMES DAILY
Status: DISCONTINUED | OUTPATIENT
Start: 2024-10-25 | End: 2024-10-30 | Stop reason: HOSPADM

## 2024-10-25 RX ORDER — ONDANSETRON 2 MG/ML
4 INJECTION INTRAMUSCULAR; INTRAVENOUS EVERY 6 HOURS PRN
Status: DISCONTINUED | OUTPATIENT
Start: 2024-10-25 | End: 2024-10-30 | Stop reason: HOSPADM

## 2024-10-25 RX ORDER — TAMSULOSIN HYDROCHLORIDE 0.4 MG/1
0.4 CAPSULE ORAL DAILY
Status: DISCONTINUED | OUTPATIENT
Start: 2024-10-25 | End: 2024-10-30 | Stop reason: HOSPADM

## 2024-10-25 RX ORDER — TORSEMIDE 20 MG/1
80 TABLET ORAL DAILY
Status: DISCONTINUED | OUTPATIENT
Start: 2024-10-25 | End: 2024-10-27

## 2024-10-25 RX ORDER — NICOTINE POLACRILEX 4 MG
15-30 LOZENGE BUCCAL
Status: DISCONTINUED | OUTPATIENT
Start: 2024-10-25 | End: 2024-10-30 | Stop reason: HOSPADM

## 2024-10-25 RX ORDER — METOPROLOL SUCCINATE 50 MG/1
150 TABLET, EXTENDED RELEASE ORAL DAILY
Status: DISCONTINUED | OUTPATIENT
Start: 2024-10-25 | End: 2024-10-25

## 2024-10-25 RX ORDER — AMOXICILLIN 250 MG
2 CAPSULE ORAL 2 TIMES DAILY PRN
Status: DISCONTINUED | OUTPATIENT
Start: 2024-10-25 | End: 2024-10-30 | Stop reason: HOSPADM

## 2024-10-25 RX ORDER — ACETAMINOPHEN 325 MG/1
650 TABLET ORAL EVERY 4 HOURS PRN
Status: DISCONTINUED | OUTPATIENT
Start: 2024-10-25 | End: 2024-10-30 | Stop reason: HOSPADM

## 2024-10-25 RX ORDER — ONDANSETRON 4 MG/1
4 TABLET, ORALLY DISINTEGRATING ORAL EVERY 6 HOURS PRN
Status: DISCONTINUED | OUTPATIENT
Start: 2024-10-25 | End: 2024-10-30 | Stop reason: HOSPADM

## 2024-10-25 RX ADMIN — SODIUM CHLORIDE, POTASSIUM CHLORIDE, SODIUM LACTATE AND CALCIUM CHLORIDE 1000 ML: 600; 310; 30; 20 INJECTION, SOLUTION INTRAVENOUS at 12:01

## 2024-10-25 RX ADMIN — PERFLUTREN 6 ML: 6.52 INJECTION, SUSPENSION INTRAVENOUS at 11:00

## 2024-10-25 RX ADMIN — SODIUM CHLORIDE 1000 ML: 9 INJECTION, SOLUTION INTRAVENOUS at 07:01

## 2024-10-25 RX ADMIN — Medication 1 TABLET: at 12:01

## 2024-10-25 RX ADMIN — FERROUS SULFATE TAB 325 MG (65 MG ELEMENTAL FE) 325 MG: 325 (65 FE) TAB at 14:16

## 2024-10-25 RX ADMIN — LIDOCAINE 2 PATCH: 4 PATCH TOPICAL at 12:04

## 2024-10-25 RX ADMIN — WARFARIN SODIUM 5 MG: 5 TABLET ORAL at 18:08

## 2024-10-25 RX ADMIN — ASPIRIN 81 MG CHEWABLE TABLET 81 MG: 81 TABLET CHEWABLE at 12:01

## 2024-10-25 RX ADMIN — ATORVASTATIN CALCIUM 40 MG: 40 TABLET, FILM COATED ORAL at 12:01

## 2024-10-25 RX ADMIN — CHLORHEXIDINE GLUCONATE 0.12% ORAL RINSE 15 ML: 1.2 LIQUID ORAL at 14:17

## 2024-10-25 RX ADMIN — TAMSULOSIN HYDROCHLORIDE 0.4 MG: 0.4 CAPSULE ORAL at 12:01

## 2024-10-25 RX ADMIN — Medication 1 SPRAY: at 18:50

## 2024-10-25 ASSESSMENT — ACTIVITIES OF DAILY LIVING (ADL)
ADLS_ACUITY_SCORE: 0

## 2024-10-25 ASSESSMENT — COLUMBIA-SUICIDE SEVERITY RATING SCALE - C-SSRS
2. HAVE YOU ACTUALLY HAD ANY THOUGHTS OF KILLING YOURSELF IN THE PAST MONTH?: NO
6. HAVE YOU EVER DONE ANYTHING, STARTED TO DO ANYTHING, OR PREPARED TO DO ANYTHING TO END YOUR LIFE?: NO
1. IN THE PAST MONTH, HAVE YOU WISHED YOU WERE DEAD OR WISHED YOU COULD GO TO SLEEP AND NOT WAKE UP?: NO

## 2024-10-25 NOTE — PROGRESS NOTES
CLINICAL NUTRITION SERVICES - ASSESSMENT NOTE     Nutrition Prescription    RECOMMENDATIONS FOR MDs/PROVIDERS TO ORDER:  Encourage PO efforts    Malnutrition Status:    Patient does not meet two of the established criteria necessary for diagnosing malnutrition but is at risk for malnutrition    Recommendations already ordered by Registered Dietitian (RD):  Continue Thera-vit-M daily  Encourage 3 meals/day with protein sources  Discontinued stress tab d/t thera-vit-M and individual vitamins already ordered    Future/Additional Recommendations:  Additional snacks/supplements PRN if pt agreeable  Monitor nutrition related findings and follow up per protocol     REASON FOR ASSESSMENT  Clarke Moreira is a/an 74 year old male assessed by the dietitian for Provider Order - failure to thrive    Patient admitted for mechanical fall, MICHELLE, and inability to care for himself. Of note, recently admitted to [facility] 9/22 to 10/3 with similar presentation and was found to have citrobacter bacteremia. Recently discharged from TCU.     MEDICAL HISTORY  DMII, CKD, HFpEF, A fib, depression, THONG, chronic anemia, hypothyroidism, HLD, and BPH     NUTRITION HISTORY  Met with pt at bedside. Pt reports reduced appetite. Reports reduced appetite/intake since October 3rd. States he had good appetite at last admission but when he was discharged to a TCU he was not eating well d/t constipation and dislike of facility food. Denies N/V. No issues chewing/swallowing or food allergies/intolerances. Reports previous wt of ~363 lbs prior to being at TCU. Declined to order a meal or any snacks/supplements at time of visit. Encouraged small, frequent PO attempts with protein sources. Discussed use of supplements if poor intake continues.    CURRENT NUTRITION ORDERS  Diet: Regular    Intake/Tolerance: No documented intakes to assess.    LABS  Cl 94  CO2 21  BUN 23.1  Cre 1.77  GFR 40  Anion gap 20  Glu 166  Hgb 11.9  MCV  "77    MEDICATIONS  Biotene  Lipitor  Iron  Insulin  Synthroid  Thera-vit-M  Kcl  [place]  Torsemide  B complex with vit c  Vit c  Vit d  warfarin    ANTHROPOMETRICS  Height: 182.9 cm (6' 0\")  Most Recent Weight: (!) 163.3 kg (360 lb)    IBW: 80.9 kg  Body mass index is 48.82 kg/m . BMI Category: Obesity Grade III BMI >40  Weight History:  13.7 kg (8%) weight loss over 7 months.  Wt Readings from Last 20 Encounters:   10/25/24 (!) 163.3 kg (360 lb)   09/28/24 (!) 166.8 kg (367 lb 11.6 oz)   07/31/24 (!) 181 kg (399 lb)   07/11/24 (!) 181.2 kg (399 lb 8 oz)   03/15/24 (!) 177 kg (390 lb 3.2 oz)   09/13/23 (!) 167.4 kg (369 lb)   07/03/23 (!) 167.4 kg (369 lb)   12/13/22 (!) 183.7 kg (405 lb)   05/09/22 (!) 183.3 kg (404 lb)   12/02/21 (!) 181.4 kg (400 lb)   06/21/21 (!) 175.7 kg (387 lb 6.4 oz)   11/09/20 (!) 180.1 kg (397 lb)   11/07/19 (!) 168.8 kg (372 lb 1.6 oz)   10/30/19 (!) 170.6 kg (376 lb 1.6 oz)   09/26/19 (!) 175.1 kg (386 lb)   09/18/19 (!) 176.9 kg (390 lb 1.6 oz)   09/03/19 (!) 179.2 kg (395 lb)   08/27/19 (!) 178.8 kg (394 lb 3.2 oz)   06/13/19 (!) 173.9 kg (383 lb 6.4 oz)   05/20/19 (!) 173.9 kg (383 lb 6.4 oz)         ASSESSED NUTRITION NEEDS  Dosing Weight: 102 kg (Adjusted BW)   Estimated Energy Needs: 7329-0242 kcals/day (20 - 25 kcals/kg)  Justification: Maintenance  Estimated Protein Needs: 122-153 grams protein/day (1.2 - 1.5 grams of pro/kg)  Justification: Increased needs  Estimated Fluid Needs: (Per provider pending fluid status)     PHYSICAL FINDINGS  See malnutrition section below.       Per EMR:      MALNUTRITION  % Intake: < 75% for > 7 days (moderate)  % Weight Loss: Weight loss does not meet criteria for malnutrition   Subcutaneous Fat Loss: None observed   Muscle Loss: None observed  Fluid Accumulation/Edema: None noted  Malnutrition Diagnosis: Patient does not meet two of the established criteria necessary for diagnosing malnutrition but is at risk for malnutrition    NUTRITION " DIAGNOSIS  Inadequate oral intake related to constipation/dislike of TCU food as evidenced by patient report      INTERVENTIONS  Implementation  Nutrition Education: will be provided if nutrition education needs arise.  and Nutrition education for recommended modifications   Multivitamin/mineral supplement therapy     Goals  Patient to consume % of nutritionally adequate meal trays TID, or the equivalent with supplements/snacks.        Monitoring/Evaluation  Progress toward goals will be monitored and evaluated per protocol.  Adela Maki MS, RD, LD, CNSC    6C (beds 1358-0859) + 7C (beds 4331-8482) + ED   Available in [facility] by name or unit dietitian

## 2024-10-25 NOTE — CONSULTS
[facility]  WO Nurse Inpatient Assessment     Consulted for: bilateral lower extremities    Summary: Bilateral knees with healing injury related to trauma from recent fall at home.  Bilateral feet with improved and healing, mostly healed abrasions vs vascular ulcerations, not open.  Bilateral lower legs with hemosiderin staining and dry flaky skin, no open wounds    Patient History (according to provider note(s):      Clarke Moreira is a 74 year old male with history of DMII, CKD, HFpEF, A fib, depression, THONG, chronic anemia, hypothyroidism, HLD, and BPH who was admitted to [facility] 10/25/2024 with mechanical fall, MICHELLE, and inability to care for himself. Of note, recently admitted to [facility] 9/22 to 10/3 with similar presentation and was found to have citrobacter bacteremia. Recently discharged from TCU.     Mechanical fall, failure to thrive: PTA fell walking to the kitchen due to knees buckling. No LOC, did not hit head. Recently discharged from TCU but struggled to care for self, perform ADLs, and limited PO intake since discharge. WBC normal. Hgb at BL. Lytes stable, glucose normal. LFTs normal. TSH 0.93 10/7/24. MICHELLE and AGMA as below. Head CT and cervical/thoracic/lumbar spine CT no acute findings. XR R knee and pelvis no acute findings. Afebrile, no hypoxia. Hypotensive as below.     Assessment:      Areas visualized during today's visit: Focused: and Lower extremities     Wound location: bilateral knees    Right knee    Left knee    BLE    Last photo: 10/25/93568  Wound due to: Trauma and vascular changes  Wound history/plan of care: healing injuries to bilateral knees form recent fall, redness, bruising and small abrasion over left knee with dry intact scab. Lower legs with vascular changes, some josef brown and dusky skin staining similar to hemosiderin staining to left anterior shin and dorsum of left foot and 2-3rd toes, see media. Bilateral toes with healing  "ulcerations, no open wounds skin is pink to red with dry flaky skin from toes to knees. Recommend vascular studies.  Wound base: 25 % Serous scabbing and Superficial scab, 75 % Blanchable , Erythema, and Epidermis     Palpation of the wound bed: normal      Drainage: none     Description of drainage: none     Measurements (length x width x depth, in cm):see media, no open wounds      Periwound skin: Intact and Dry/scaly      Color: dusky and jacek      Temperature: cool  Odor: none  Pain: denies , none  Pain interventions prior to dressing change: slow and gentle cares   Treatment goal: Infection control/prevention, Maintain (prevention of deterioration), Protection, and Simplify plan of care  STATUS: initial assessment  Supplies ordered: ordered Vashe wound cleanser and discussed with patient       Treatment Plan:     Upper extremities, bilateral knees and generalized body: Daily and PRN  After bathing, apply sween cream or body lotion to maintain skin integrity and provide moisture to dry skin     Pressure Injury Prevention (PIP) Plan:  If patient is declining pressure injury prevention interventions: Explore reason why and address patient's concerns, Educate on pressure injury risk and prevention intervention(s), If patient is still declining, document \"informed refusal\" , and Ensure Care team is aware ( provider, charge nurse, etc)  Mattress: Follow bed algorithm, add Low Air Loss (Air+) mattress pump if skin is very moist or constantly moist.   HOB: Maintain at or below 30 degrees, unless contraindicated  Repositioning in bed: Every 1-2 hours , Left/right positioning; avoid supine, Raise foot of bed prior to raising head of bed, to reduce patient sliding down (shear), and Frequent microturns using positioning wedges, as patient tolerates  Heels: Keep elevated off mattress, Pillows under calves, and Heel lift boots  Protective Dressing: Sacral Mepilex for prevention (#203315),  especially for the agitated " patient   Positioning Equipment:None  Chair positioning: Chair cushion (#181205) , Repositions self: patient to shift weight every 15 minutes, Assist patient to reposition hourly, Do NOT use a donut for sitting (this increases pressure to smaller area and creates a higher potential for injury) , and Order Bariatric chair cushion    If patient has a buttock pressure injury, or high risk for PI use chair cushion or SPS.  Moisture Management: Perineal cleansing /protection: Follow Incontinence Protocol, Avoid brief in bed, Clean and dry skin folds with bathing , Consider InterDry (#522292) between folds, and Moisturize dry skin  Under Devices: Inspect skin under all medical devices during skin inspection , Ensure tubes are stabilized without tension, and Ensure patient is not lying on medical devices or equipment when repositioned  Ask provider to discontinue device when no longer needed.    Bilateral lower leg  Monday/Wednesday/Friday  Moisten kerlex gauze rolls x2 with Vashe wound cleanser. Squeeze out excess moisture.  Wrap bilateral legs from toes to just below knees and soak for 5 minutes.   Remove wraps and discard.   Moisturize bilateral lower legs with Atractain/Urea moisturizer, see EMAR, DO NOT apply between toes.  Apply edema wear size large, see additional instructions below for edema wear use.    EdemaWear stockings: Use and Care    Rationale for use:   Decreases edema by improving lymphatic and venous function    Safe and gentle compression  Enhances wound healing  Protects skin    General:   EdemaWear can be worn 24/7, but should be removed at least daily for skin inspection and cares    In order to be effective, EdemaWear should DIRECTLY contact the skin as much as possible -- the mesh weave promotes lymphatic drainage when it is pressed into the skin  Choose appropriate size EdemaWear based on leg (or arm) circumference - see table for guidelines  EdemaWear LITE is only for especially fragile or  "painful skin  Cleanse and moisturize any intact or scaly skin before applying EdemaWear  Ok to apply additional compression over the EdemaWear (ie Lymph wraps)    Application:   Apply the EdemaWear from base of toes to knee, or above knee if tolerated and it is a long stocking  Create a wide 3\" cuff at the top if needed to prevent rolling down  Only trim the stocking if it is excessively long; do NOT cut in half; can often fold over excess length onto foot    When wounds are present:  Wound dressings that directly treat the wound bed can be applied under the EdemaWear  Any additional cover dressings (dry gauze, ABD pads, Kerlix, etc) should be applied ON TOP of the stockings whenever feasible   Stockings may get soiled with drainage and will need to be washed; ensure an extra clean, dry pair always available  May need two people to apply the stocking - bunch up stocking and pull against each other, lifting over wounds    Care:  When stockings are soiled, DO NOT THROW AWAY, hand wash with mild soap, rinse, hang dry  Replace approximately every 4 to 6 months        EdemaWear size Max circumference Stripe color PS # # stockings per pack   Small 18\"  (45cm) navy 320293 2   Medium 30\"  (75cm) yellow 275335 1   Large 46\"  (115cm) red [id number] 1   X Large 60\"  (150cm) aqua [id number] 1   Small LITE 24\"  (60cm) purple [id number] 2   Medium LITE 36\"  (90cm) orange [id number] 1           Orders: Written    RECOMMEND PRIMARY TEAM ORDER: Vascular consult  Education provided: importance of repositioning, plan of care, wound progress, Infection prevention , Moisture management, Hygiene, Nail care, and Off-loading pressure  Discussed plan of care with: Patient  WOC nurse follow-up plan: weekly  Notify WOC if wound(s) deteriorate.  Nursing to notify the Provider(s) and re-consult the WOC Nurse if new skin concern.    DATA:     Current support surface: Standard  Standard gel mattress (Isoflex)  Containment of urine/stool: Continent of bladder, " Continent of bowel, Incontinence Protocol, Urinal, and Incontinent pad in bed  BMI: Body mass index is 46.55 kg/m .   Active diet order: Orders Placed This Encounter      Combination Diet Regular Diet Adult     Output: No intake/output data recorded.     Labs:   Recent Labs   Lab 10/25/24  1012 10/25/24  0454   ALBUMIN  --  3.6   HGB  --  11.9*   INR 3.48* 3.59*   WBC  --  7.2     Pressure injury risk assessment:                          Azeb Campbell RN, BSN, CWOCN   Pager no longer is use, please contact through "[employer] group: [facility] Nurse [place]  Dept. Office Number: [phone]

## 2024-10-25 NOTE — PROGRESS NOTES
8A Admission Note    Reason for admission: Post-Mechanical fall  Primary team notified of pt arrival.  Admitted from: ED  Via: EMS  Accompanied by: N/A  Belongings: Placed in closet; valuables sent home with family  Admission Required Doc Completed: Yes/No  Teaching: Orientation to unit and call light- call light within reach, use of console, meal times, when to call for the RN, and enforced importance of safety.  IV Access: yes  Telemetry: No  Ht./Wt.: Completed  Code Status verified on armband: Yes  2 RN Skin Assessment Completed with: yes. Redness and bruising on BLE knee and shin.  Suction/Ambu bag/Flowmeter at bedside: Yes    Pt status:alert and oriented, calm and cooperative with cares. Able to communicate needs. R PIV patent, saline locked. Continent of B/B, uses urinal at bedside. On Regular diet with fair appetite, takes meds whole with thin liquids. Continue with plan of care.     Temp:  [98.1  F (36.7  C)-98.2  F (36.8  C)] 98.1  F (36.7  C)  Pulse:  [] 107  Resp:  [18-20] 20  BP: ()/() 139/90  SpO2:  [95 %-100 %] 95 %

## 2024-10-25 NOTE — H&P
[facility]    History and Physical - Hospitalist Service, GOLD TEAM   Date of Admission: 10/25/2024    Assessment & Plan   Clarke Moreira is a 74 year old male with history of DMII, CKD, HFpEF, A fib, depression, THONG, chronic anemia, hypothyroidism, HLD, and BPH who was admitted to [facility] 10/25/2024 with mechanical fall, MICHELLE, and inability to care for himself. Of note, recently admitted to [facility] 9/22 to 10/3 with similar presentation and was found to have citrobacter bacteremia. Recently discharged from TCU.    Mechanical fall, failure to thrive: PTA fell walking to the kitchen due to knees buckling. No LOC, did not hit head. Recently discharged from TCU but struggled to care for self, perform ADLs, and limited PO intake since discharge. WBC normal. Hgb at BL. Lytes stable, glucose normal. LFTs normal. TSH 0.93 10/7/24. MICHELLE and AGMA as below. Head CT and cervical/thoracic/lumbar spine CT no acute findings. XR R knee and pelvis no acute findings. Afebrile, no hypoxia. Hypotensive as below.   - Echo  - EKG  - Trend CBC, BMP, Mg, phos  - UA/UC  - BCx x1 given recent bacteremia  - PT, OT, nutrition consults   - Pain management with Tylenol and Lidoderm patches  - Code status: DNR/DNI but pressors are ok    Hypotension: BP 80s-110s/50s-70s in the ED. Treated with 1L bolus on presentation  - Repeat bolus   - Encourage oral intake  - Infectious workup as above  - Orthostatic pressures  - Fall precautions   ** Addendum: BP improved to 135/118. Will resume metoprolol but holding other meds given MICHELLE    AGMA: Bicarb 21, AG 20 in the setting of the above. Similar labs upon 9/2024 admission  - Daily BMP, sooner if acute changes     MICHELLE on CKD III: BL Cr around 1. Elevated to 1.77 10/25, BUN 23.1. Likely prerenal in the setting of poor oral intake. Treated with IVF in the ED  - IVF as above  - UA/UC  - CK  - Daily BMP    DMII: A1c 6.2 10/7. PTA on Metformin only. Glucose stable  -  Hold Metformin  - MSSI, hypoglycemia protocol     HFpEF: Last echo 3/2021 technically difficult, EF 50-55%, global RV function mildly reduced with mild RV dilation. PTA on Metoprolol, Spironolactone, Torsemide, KCl. Hypotension and MICHELLE as above  - Hold PTA meds for now. Resume as able  - Echo as above    A fib, coagulopathy: CHADSVASC 5. PTA on Coumadin and Metoprolol. INR 3.59 10/25  - Coumadin per pharmacy  - Continue metoprolol with hold parameters     Other Medical Issues:  HLD: Continue PTA ASA and statin  Depression: No PTA meds. Management per OP teams  THONG: Continue CPAP   Chronic microcytic anemia: BL hgb 10.5-12 and stable. MCV 77. Iron low to 14 9/22/2024. PTA on iron, continue  Hypothyroidism: TSH 0.93 10/7/24. Continue synthroid   BPH: Continue Flomax  Chronic wounds: Per notes, 2/2 DM. WONC consulted        Diet:  Regular  DVT Prophylaxis: Coumadin per pharmacy, mechanical   Bazzi Catheter: Not present  Lines: None     Cardiac Monitoring: None  Code Status:  DNR/DNI, pressors ok    Clinically Significant Risk Factors Present on Admission          # Hypochloremia: Lowest Cl = 94 mmol/L in last 2 days, will monitor as appropriate     # Anion Gap Metabolic Acidosis: Highest Anion Gap = 20 mmol/L in last 2 days, will monitor and treat as appropriate   # Drug Induced Coagulation Defect: home medication list includes an anticoagulant medication  # Drug Induced Platelet Defect: home medication list includes an antiplatelet medication  # Acute Kidney Injury, unspecified: based on a >150% or 0.3 mg/dL increase in last creatinine compared to past 90 day average, will monitor renal function  # Hypertension: Noted on problem list         # Severe Obesity: Estimated body mass index is 48.82 kg/m  as calculated from the following:    Height as of this encounter: 1.829 m (6').    Weight as of this encounter: 163.3 kg (360 lb).       # Financial/Environmental Concerns:           Disposition Plan     Medically Ready  for Discharge: Anticipated in 2-4 Days    The patient's care was discussed with the  patient and attending physician .    Dulce Larson PA-C  Hospitalist Service, [facility]  Securely message with [employer] (more info)  Text page via [facility] Paging/Directory   See signed in provider for up to date coverage information    ______________________________________________________________________    Chief Complaint   Falls    History is obtained from the patient and medical record     History of Present Illness   Clarke Moreira is a 74 year old male with history of DMII, CKD, HFpEF, A fib, depression, THONG, chronic anemia, hypothyroidism, HLD, and BPH who was admitted to [facility] 10/25/2024 with mechanical fall, MICHELLE, and inability to care for himself. Of note, recently admitted to [facility] 9/22 to 10/3 with similar presentation and was found to have citrobacter bacteremia. Recently discharged from TCU.    Patient reports leaving TCU and having a hard time caring for himself. He got up to go to the kitchen, lost balance and fell. Knees buckled. Hit hips and lower extremities, denies hitting head or LOC. Reports some ongoing knee and low back pain since getting Xrays but it is getting better. Denies urinary symptoms, diarrhea, fever or chills. Some mild constipation. Appetite has been poor. No chest pain, dyspnea, palpitations, or URI symptoms. No AMS. Denies new rash. No new swelling in the extremities.     Past Medical History    Past Medical History:   Diagnosis Date    Atrial fibrillation (H)     Basal cell carcinoma     Chronic anticoagulation     Diastolic heart failure (H)     Dyslipidemia     Essential hypertension     Morbid obesity (H)     Sleep-disordered breathing     Type 2 diabetes mellitus (H)        Past Surgical History   Past Surgical History:   Procedure Laterality Date    BIOPSY OF SKIN LESION      MOHS MICROGRAPHIC PROCEDURE      PICC INSERTION  Right 09/24/2017    5fr TL Bard PICC, 51cm (1cm external) in the R medial brachial vein w/ tip in the SVC.       Prior to Admission Medications   Prior to Admission Medications   Prescriptions Last Dose Informant Patient Reported? Taking?   CERTAVITE/ANTIOXIDANTS tablet   No No   Sig: TAKE ONE TABLET BY MOUTH ONE TIME DAILY   Omega-3 Fatty Acids (EQL FISH OIL) 1000 MG CAPS   No No   Sig: Take 1 capsule by mouth daily   Vitamin D, Cholecalciferol, 25 MCG (1000 UT) CAPS   Yes No   Sig: Take 1 capsule by mouth daily.   ammonium lactate (LAC-HYDRIN) 12 % external cream   No No   Sig: Apply topically 2 times daily as needed for dry skin   aspirin (ASA) 81 MG chewable tablet   No No   Sig: Take 1 tablet (81 mg) by mouth daily.   atorvastatin (LIPITOR) 40 MG tablet   No No   Sig: Take 1 tablet (40 mg) by mouth daily   chlorhexidine (PERIDEX) 0.12 % solution   No No   Sig: Swish and spit 15 mLs in mouth 2 times daily. For oral care   ferrous sulfate (FEROSUL) 325 (65 Fe) MG tablet   No No   Sig: Take 1 tablet (325 mg) by mouth every other day. For iron deficiency anemia   levothyroxine (SYNTHROID/LEVOTHROID) 175 MCG tablet   No No   Sig: Take 1 tablet (175 mcg) by mouth every morning (before breakfast)   metFORMIN (GLUCOPHAGE) 500 MG tablet   No No   Sig: Take 1 tablet (500 mg) by mouth 2 times daily (with meals).   metoprolol succinate ER (TOPROL XL) 100 MG 24 hr tablet   No No   Sig: Take 1.5 tablets (150 mg) by mouth daily.   mineral oil-white petrolatum (EUCERIN/MINERIN) cream   No No   Sig: Apply topically every hour as needed for dry skin. Dry skin   nystatin (MYCOSTATIN) 626029 UNIT/GM external cream   No No   Sig: Apply topically 2 times daily as needed for dry skin   order for DME   No No   Sig: Equipment being ordered: Other: Velcro Compression stockings.   Treatment Diagnosis: bilateral lymphedema.   order for DME   No No   Sig: Equipment being ordered: Bariatric Lift chair  Diagnosis - morbid obesity, CHF,  Lymphedema    Fax to Ne from [employer] at [phone].   order for DME   No No   Sig: Equipment being ordered: Custom diabetic shoes   order for DME   No No   Sig: Equipment being ordered: Diabetes Shoes   potassium chloride chen ER (KLOR-CON M20) 20 MEQ CR tablet   No No   Sig: Take 2 tablets (40 mEq) by mouth 2 times daily   senna-docusate (SENEXON-S) 8.6-50 MG tablet   No No   Sig: Take 1 to 2 tablets by mouth 2 times daily as needed for constipation   spironolactone (ALDACTONE) 25 MG tablet   No No   Sig: Take 2 tablets (50 mg) by mouth daily   tamsulosin (FLOMAX) 0.4 MG capsule   No No   Sig: Take 1 capsule (0.4 mg) by mouth daily   torsemide 40 MG TABS   No No   Sig: Take 80 mg by mouth daily.   urea (CARMOL) 10 % external cream   No No   Sig: Apply topically 2 times daily as needed for dry skin. Dry skin   vitamin B complex with vitamin C (VITAMIN  B COMPLEX) tablet   No No   Sig: Take 1 tablet by mouth daily   vitamin C (ASCORBIC ACID) 1000 MG TABS   Yes No   Sig: Take 1,000 mg by mouth daily   vitamin E (VITAMIN E COMPLEX) 1000 units (450 mg) capsule   No No   Sig: Take 1 capsule (1,000 Units) by mouth daily   warfarin ANTICOAGULANT (COUMADIN) 5 MG tablet   No No   Sig: Take 5 mg (5 mg x 1) every Thu; 10 mg (5 mg x 2) all other days or as directed by the INR clinic.   Patient taking differently: Patient takes 10 mg (5 mg x 2) everyday or as directed by the INR clinic.      Facility-Administered Medications: None        Review of Systems    The 10 point Review of Systems is negative other than noted in the HPI or here.      Physical Exam   Vital Signs: Temp: 98.1  F (36.7  C) Temp src: Oral BP: (!) 89/53 Pulse: 97   Resp: 18 SpO2: 96 % O2 Device: None (Room air)    Weight: 360 lbs 0 oz  General Appearance: Alert and oriented x3, pleasant. Appears comfortable   HEENT: Anicteric sclera, MMM   Respiratory: Breathing comfortably on room air, lungs CTAB without wheezing or crackles   Cardiovascular:   Irregular, rate 80s. No murmur noted  GI: Abdomen soft, non-tender with active bowel sounds. No guarding or rebound  Lymph/Hematologic: 1+ BLE peripheral edema, distal pulses palpable   Skin: No rash or jaundice. BLE venous stasis changes  Musculoskeletal: Moves all extremities   Neurologic: No focal deficits, CN II-XII grossly intact    Medical Decision Making       80 MINUTES SPENT BY ME on the date of service doing chart review, history, exam, documentation & further activities per the note.      Data     I have personally reviewed the following data over the past 24 hrs:    7.2  \   11.9 (L)   / 369     135 94 (L) 23.1 (H) /  166 (H)   3.4 21 (L) 1.77 (H) \     ALT: 12 AST: 27 AP: 119 TBILI: 0.7   ALB: 3.6 TOT PROTEIN: 7.1 LIPASE: N/A     INR:  3.59 (H) PTT:  N/A   D-dimer:  N/A Fibrinogen:  N/A       Imaging results reviewed over the past 24 hrs:   Recent Results (from the past 24 hours)   CT Cervical Spine w/o Contrast    Narrative    EXAM: CT HEAD W/O CONTRAST, CT CERVICAL SPINE W/O CONTRAST  LOCATION: [facility]  DATE: 10/25/2024    INDICATION: Trauma on warfarin  COMPARISON: None.  TECHNIQUE:   1) Routine CT Head without IV contrast. Multiplanar reformats. Dose reduction techniques were used.  2) Routine CT Cervical Spine without IV contrast. Multiplanar reformats. Dose reduction techniques were used.    FINDINGS:   HEAD CT:   INTRACRANIAL CONTENTS: No intracranial hemorrhage, extraaxial collection, or mass effect.  No CT evidence of acute infarct. 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 Mild presumed chronic small vessel ischemic changes. Normal ventricles and   sulci.     VISUALIZED ORBITS/SINUSES/MASTOIDS: No intraorbital abnormality. No paranasal sinus mucosal disease. No middle ear or mastoid effusion.    BONES/SOFT TISSUES: No acute abnormality.    CERVICAL SPINE CT:   VERTEBRA: Normal vertebral body heights and alignment. No fracture or  posttraumatic subluxation.     CANAL/FORAMINA: Multilevel spondylosis without high grade canal stenosis.    PARASPINAL: No extraspinal abnormality. Visualized lung fields are clear.      Impression    IMPRESSION:  HEAD CT:  1.  No acute intracranial process.    CERVICAL SPINE CT:  1.  No CT evidence for acute fracture or post traumatic subluxation.   CT Head w/o Contrast    Narrative    EXAM: CT HEAD W/O CONTRAST, CT CERVICAL SPINE W/O CONTRAST  LOCATION: [facility]  DATE: 10/25/2024    INDICATION: Trauma on warfarin  COMPARISON: None.  TECHNIQUE:   1) Routine CT Head without IV contrast. Multiplanar reformats. Dose reduction techniques were used.  2) Routine CT Cervical Spine without IV contrast. Multiplanar reformats. Dose reduction techniques were used.    FINDINGS:   HEAD CT:   INTRACRANIAL CONTENTS: No intracranial hemorrhage, extraaxial collection, or mass effect.  No CT evidence of acute infarct. 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 Mild presumed chronic small vessel ischemic changes. Normal ventricles and   sulci.     VISUALIZED ORBITS/SINUSES/MASTOIDS: No intraorbital abnormality. No paranasal sinus mucosal disease. No middle ear or mastoid effusion.    BONES/SOFT TISSUES: No acute abnormality.    CERVICAL SPINE CT:   VERTEBRA: Normal vertebral body heights and alignment. No fracture or posttraumatic subluxation.     CANAL/FORAMINA: Multilevel spondylosis without high grade canal stenosis.    PARASPINAL: No extraspinal abnormality. Visualized lung fields are clear.      Impression    IMPRESSION:  HEAD CT:  1.  No acute intracranial process.    CERVICAL SPINE CT:  1.  No CT evidence for acute fracture or post traumatic subluxation.   CT Thoracic Spine w/o Contrast    Narrative    EXAM: CT THORACIC SPINE W/O CONTRAST, CT LUMBAR SPINE W/O CONTRAST  LOCATION: [facility]  DATE/TIME: 10/25/2024 6:48 AM  CDT    INDICATION: Trauma  COMPARISON: None.  TECHNIQUE:   1) Routine CT Thoracic Spine without IV contrast. Multiplanar reformats. Dose reduction techniques were used.   2) Routine CT Lumbar Spine without IV contrast. Multiplanar reformats. Dose reduction techniques were used.     FINDINGS:     VERTEBRA: Normal vertebral body heights and alignment. No acute compression fracture or posttraumatic subluxation. Flowing ventral osteophytes throughout the thoracic spine in keeping with diffuse idiopathic skeletal hyperostosis.    CANAL/FORAMINA: No significant canal or neural foraminal stenosis.    PARASPINAL: Cholelithiasis.      Impression    IMPRESSION:    1.  No fracture or posttraumatic subluxation.  2.  Cholelithiasis.   CT Lumbar Spine w/o Contrast    Narrative    EXAM: CT THORACIC SPINE W/O CONTRAST, CT LUMBAR SPINE W/O CONTRAST  LOCATION: [facility]  DATE/TIME: 10/25/2024 6:48 AM CDT    INDICATION: Trauma  COMPARISON: None.  TECHNIQUE:   1) Routine CT Thoracic Spine without IV contrast. Multiplanar reformats. Dose reduction techniques were used.   2) Routine CT Lumbar Spine without IV contrast. Multiplanar reformats. Dose reduction techniques were used.     FINDINGS:     VERTEBRA: Normal vertebral body heights and alignment. No acute compression fracture or posttraumatic subluxation. Flowing ventral osteophytes throughout the thoracic spine in keeping with diffuse idiopathic skeletal hyperostosis.    CANAL/FORAMINA: No significant canal or neural foraminal stenosis.    PARASPINAL: Cholelithiasis.      Impression    IMPRESSION:    1.  No fracture or posttraumatic subluxation.  2.  Cholelithiasis.   XR Knee Right 1/2 Views    Narrative    2 views right knee radiographs 10/25/2024 8:13 AM    History: Trauma    Additional History from EMR: Walking to the kitchen today with a  walker and felt knees buckle causing a fall.    Comparison: Bilateral knee x-ray  9/22/2024    Findings:  AP and lateral views of the right knee were obtained.     No acute bony abnormality  Fibular head irregularity, similar to  9/20/2024. No joint effusion.    Mild to moderate medial and mild lateral joint space narrowing. Tibial  spine sharpening.    Soft tissue is unremarkable.      Impression    Impression:  1. No acute bony abnormality. Similar irregular appearance of the  fibular head compared to 9/22/2024.  2. Mild to moderate degenerative changes of the knee.    I have personally reviewed the examination and initial interpretation  and I agree with the findings.    JERMAINE GARCIA MD (Joe)         SYSTEM ID:  [id number]   XR Pelvis w Hip Right 1 View    Narrative    EXAM: XR PELVIS AND HIP RIGHT 1 VIEW, 10/25/2024 8:13 AM    History: Trauma    Comparison: CT AP 9/22/2024    Findings:  AP and right frog-leg lateral view(s) of the pelvis were obtained.  Cranial portion of the iliac crest are not seen within the  field-of-view.    No acute osseous abnormality.    Mild bilateral hip joint space loss with marginal acetabular  osteophytosis.     Sacrum and innominate bones are partially obscured by overlying bowel  gas/fecal content.    Pelvic phlebolith. Pelvic and trochanteric enthesopathy. Lower lumbar  degeneration.      Impression    Impression:  1. The iliac crests are not completely visualized. Otherwise, no acute  bony abnormality.  2. Mild to moderate right and mild left hip joint space narrowing.    I have personally reviewed the examination and initial interpretation  and I agree with the findings.    JERMAINE GARCIA MD (Joe)         SYSTEM ID:  [id number]

## 2024-10-25 NOTE — ED TRIAGE NOTES
Pt BIBA sustained a fall, pt was getting oob. C/o low back pain, didn't hit his head. Pt on wafarin. Recently hospitalized for sepsis 20G r forearm 198blood sugar, pressures soft, pt in afib.            Triage Assessment (Adult)       Row Name 10/25/24 0433          Triage Assessment    Airway WDL WDL        Respiratory WDL    Respiratory WDL WDL        Skin Circulation/Temperature WDL    Skin Circulation/Temperature WDL WDL        Cardiac WDL    Cardiac WDL X        Peripheral/Neurovascular WDL    Peripheral Neurovascular WDL WDL        Cognitive/Neuro/Behavioral WDL    Cognitive/Neuro/Behavioral WDL WDL

## 2024-10-25 NOTE — PHARMACY-ANTICOAGULATION SERVICE
Clinical Pharmacy - Warfarin Dosing Consult     Pharmacy has been consulted to manage this patient s warfarin therapy.  Indication: Atrial Fibrillation  Therapy Goal: INR 2-3  Provider/Team: Gold  OP Anticoag Clinic: [facility]  Warfarin Prior to Admission: Yes  Warfarin PTA Regimen: 10 mg daily (5 mg dose on 10/17 but 10 mg daily otherwise)  Significant drug interactions: acetaminophen (may inc INR, moderate interaction), ASA (increased risk of bleeding)  Recent documented change in oral intake/nutrition: No  Dose Comments: Will dose with 5 mg today given elevated INR but avoiding holding a dose to minimize drop in INR    INR   Date Value Ref Range Status   10/25/2024 3.59 (H) 0.85 - 1.15 Final   10/10/2024 2.50 (H) 0.85 - 1.15 Final       Recommend warfarin 5 mg today.  Pharmacy will monitor Clarke Marreroon daily and order warfarin doses to achieve specified goal.      Please contact pharmacy as soon as possible if the warfarin needs to be held for a procedure or if the warfarin goals change.

## 2024-10-25 NOTE — MEDICATION SCRIBE - ADMISSION MEDICATION HISTORY
Medication Scribe Admission Medication History    Admission medication history is complete. The information provided in this note is only as accurate as the sources available at the time of the update.    Information Source(s): Patient via in-person    Pertinent Information: Spoke with patient in person and completed medication hx.     Patient states that for his Metoprolol 100 MG 24 Hr Tab his dosage changed to 150 MG Tab on September 22nd when he was in the hospital and states that this change was not sent over to the pharmacy so he was taking 100 mg.     Patient states that for his Torsemide 40 MG Tab he also had a dosage change as well. He states that he was taking 100 mg tab but dosage was lowered to 80 mg.     Patient states that he is not using any creams,lotions and ointments.     Changes made to PTA medication list:  Added: None  Deleted: Ammonium Lactate 12 % External Cream            Chlorhexidine 0.12% Solution            Mineral-oil White Petrolatum Cream            Nystatin [id number] Unit/GM External Cream             Urea 10% External Cream  Changed: None    Allergies reviewed with patient and updates made in EHR: no    Medication History Completed By: Kendy Carvalho 10/25/2024 11:39 AM    Prior to Admission Medications   Prescriptions Last Dose Informant Patient Reported? Taking?   CERTAVITE/ANTIOXIDANTS tablet More than a month  No Yes   Sig: TAKE ONE TABLET BY MOUTH ONE TIME DAILY   Omega-3 Fatty Acids (EQL FISH OIL) 1000 MG CAPS 10/24/2024 Morning  No Yes   Sig: Take 1 capsule by mouth daily   VITAMIN A PO 10/24/2024 Morning  Yes Yes   Sig: Take 1 tablet by mouth daily.   Vitamin D, Cholecalciferol, 25 MCG (1000 UT) CAPS 10/24/2024 Morning  Yes Yes   Sig: Take 1 capsule by mouth daily.   aspirin (ASA) 81 MG chewable tablet 10/25/2024 Morning  No Yes   Sig: Take 1 tablet (81 mg) by mouth daily.   atorvastatin (LIPITOR) 40 MG tablet 10/24/2024 at 12:00 PM  No Yes   Sig: Take 1 tablet (40 mg) by mouth  daily   ferrous sulfate (FEROSUL) 325 (65 Fe) MG tablet Unknown  No Yes   Sig: Take 1 tablet (325 mg) by mouth every other day. For iron deficiency anemia   levothyroxine (SYNTHROID/LEVOTHROID) 175 MCG tablet 10/24/2024 Morning  No Yes   Sig: Take 1 tablet (175 mcg) by mouth every morning (before breakfast)   metFORMIN (GLUCOPHAGE) 500 MG tablet 10/24/2024 Evening  No Yes   Sig: Take 1 tablet (500 mg) by mouth 2 times daily (with meals).   metoprolol succinate ER (TOPROL XL) 100 MG 24 hr tablet 10/24/2024  No Yes   Sig: Take 1.5 tablets (150 mg) by mouth daily.   order for DME More than a month  No Yes   Sig: Equipment being ordered: Other: Velcro Compression stockings.   Treatment Diagnosis: bilateral lymphedema.   order for DME 10/24/2024  No Yes   Sig: Equipment being ordered: Bariatric Lift chair  Diagnosis - morbid obesity, CHF, Lymphedema    Fax to Ne from [employer] at [phone].   order for DME More than a month  No Yes   Sig: Equipment being ordered: Custom diabetic shoes   order for DME More than a month  No Yes   Sig: Equipment being ordered: Diabetes Shoes   potassium chloride chen ER (KLOR-CON M20) 20 MEQ CR tablet 10/24/2024 Morning  No Yes   Sig: Take 2 tablets (40 mEq) by mouth 2 times daily   senna-docusate (SENEXON-S) 8.6-50 MG tablet 10/24/2024 Morning  No Yes   Sig: Take 1 to 2 tablets by mouth 2 times daily as needed for constipation   spironolactone (ALDACTONE) 25 MG tablet 10/24/2024  No Yes   Sig: Take 2 tablets (50 mg) by mouth daily   tamsulosin (FLOMAX) 0.4 MG capsule 10/24/2024  No Yes   Sig: Take 1 capsule (0.4 mg) by mouth daily   torsemide 40 MG TABS 10/24/2024  No Yes   Sig: Take 80 mg by mouth daily.   vitamin B complex with vitamin C (VITAMIN  B COMPLEX) tablet 10/24/2024 Morning  No Yes   Sig: Take 1 tablet by mouth daily   vitamin C (ASCORBIC ACID) 1000 MG TABS 10/24/2024 Morning  Yes Yes   Sig: Take 1,000 mg by mouth daily   vitamin E (VITAMIN E COMPLEX) 1000 units (450 mg)  capsule 10/24/2024 Morning  No Yes   Sig: Take 1 capsule (1,000 Units) by mouth daily   warfarin ANTICOAGULANT (COUMADIN) 5 MG tablet 10/24/2024  No Yes   Sig: Take 5 mg (5 mg x 1) every Thu; 10 mg (5 mg x 2) all other days or as directed by the INR clinic.   Patient taking differently: Patient takes 10 mg (5 mg x 2) everyday or as directed by the INR clinic.      Facility-Administered Medications: None

## 2024-10-25 NOTE — ED PROVIDER NOTES
ED Provider Note  [facility]      History     Chief Complaint   Patient presents with    Fall     HPI  Clarke Moreira is a 74 year old male who has a past medical history of T2DM w/ neuropathy and chronic ulcers, pAF, HFpEF, HLD, MDD, on CPAP and uses walker at home who presents to the emergency department for evaluation of a fall.  Patient reports that this morning he was getting out of bed when he had a fall.  Patient states that his legs do not work.  Patient states that he was walking to the kitchen and felt as if his knees buckled and he collapsed.  Patient states that he fell backwards however does not believe he hit his head.  Patient states he did hit the floor and the stove however believes his body took most of the impact.  Patient states he was unable to get up but he was able to crawl to the phone to call 911. Patient fell and landed on his back and his right side.  Complains of low back pain, right hip pain, and right knee pain.  Also complains of generalized weakness.  Patient states that he does not think he passed out or syncopized.  No loss of consciousness.  Patient states that he recently was admitted, and discharged to a TCU, however went home this past Monday.  Patient gets around at home with a walker.  Denies any fever, chills, chest pain, shortness of breath, abdominal pain.  No other complaints.    Past Medical History  Past Medical History:   Diagnosis Date    Atrial fibrillation (H)     Basal cell carcinoma     Chronic anticoagulation     Diastolic heart failure (H)     Dyslipidemia     Essential hypertension     Morbid obesity (H)     Sleep-disordered breathing     Type 2 diabetes mellitus (H)      Past Surgical History:   Procedure Laterality Date    BIOPSY OF SKIN LESION      MOHS MICROGRAPHIC PROCEDURE      PICC INSERTION Right 09/24/2017    5fr TL Bard PICC, 51cm (1cm external) in the R medial brachial vein w/ tip in the SVC.     ammonium lactate  (LAC-HYDRIN) 12 % external cream  aspirin (ASA) 81 MG chewable tablet  atorvastatin (LIPITOR) 40 MG tablet  CERTAVITE/ANTIOXIDANTS tablet  chlorhexidine (PERIDEX) 0.12 % solution  ferrous sulfate (FEROSUL) 325 (65 Fe) MG tablet  levothyroxine (SYNTHROID/LEVOTHROID) 175 MCG tablet  metFORMIN (GLUCOPHAGE) 500 MG tablet  metoprolol succinate ER (TOPROL XL) 100 MG 24 hr tablet  mineral oil-white petrolatum (EUCERIN/MINERIN) cream  nystatin (MYCOSTATIN) 376138 UNIT/GM external cream  Omega-3 Fatty Acids (EQL FISH OIL) 1000 MG CAPS  order for DME  order for DME  order for DME  order for DME  potassium chloride chen ER (KLOR-CON M20) 20 MEQ CR tablet  senna-docusate (SENEXON-S) 8.6-50 MG tablet  spironolactone (ALDACTONE) 25 MG tablet  tamsulosin (FLOMAX) 0.4 MG capsule  torsemide 40 MG TABS  urea (CARMOL) 10 % external cream  vitamin B complex with vitamin C (VITAMIN  B COMPLEX) tablet  vitamin C (ASCORBIC ACID) 1000 MG TABS  Vitamin D, Cholecalciferol, 25 MCG (1000 UT) CAPS  vitamin E (VITAMIN E COMPLEX) 1000 units (450 mg) capsule  warfarin ANTICOAGULANT (COUMADIN) 5 MG tablet      Allergies   Allergen Reactions    Seasonal Allergies      Family History  Family History   Problem Relation Age of Onset    Depression Mother     Glaucoma Maternal Grandfather     Cancer No family hx of         No family history of skin cancer    Macular Degeneration No family hx of      Social History   Social History     Tobacco Use    Smoking status: Never    Smokeless tobacco: Never   Substance Use Topics    Alcohol use: No     Comment: Former alcohol abuse. Quit 70s then quit later in 80s-present    Drug use: No     Comment: history of LSD use      Past medical history, past surgical history, medications, allergies, family history, and social history were reviewed with the patient. No additional pertinent items.   A medically appropriate review of systems was performed with pertinent positives and negatives noted in the HPI, and all  other systems negative.    Physical Exam   BP: (!) 84/51  Pulse: 91  Temp: 98.2  F (36.8  C)  Resp: 18  Height: 182.9 cm (6')  Weight: (!) 163.3 kg (360 lb)  SpO2: 100 %  Physical Exam  General: Afebrile, no acute distress   HEENT: Normocephalic, atraumatic, conjunctivae normal. MMM  Neck: non-tender, supple, no midline TTP  Cardio: regular rate. regular rhythm   Resp: Normal work of breathing, no respiratory distress, lungs clear bilaterally, no wheezing, rhonchi, rales  Chest/Back: no visual signs of trauma, +TTP midline thoracic and lumbar spine, no CVA tenderness   Abdomen: soft, non distension, no tenderness, no peritoneal signs   Neuro: alert and fully oriented. CN II-XII grossly intact. Grossly normal strength and sensation in all extremities.   MSK: +TTP right hip, right knee, right knee with superficial abrasion  Integumentary/Skin: no rash visualized, normal color  Psych: normal affect, normal behavior      ED Course, Procedures, & Data      Procedures    Results for orders placed or performed during the hospital encounter of 10/25/24   CT Head w/o Contrast     Status: None    Narrative    EXAM: CT HEAD W/O CONTRAST, CT CERVICAL SPINE W/O CONTRAST  LOCATION: [facility]  DATE: 10/25/2024    INDICATION: Trauma on warfarin  COMPARISON: None.  TECHNIQUE:   1) Routine CT Head without IV contrast. Multiplanar reformats. Dose reduction techniques were used.  2) Routine CT Cervical Spine without IV contrast. Multiplanar reformats. Dose reduction techniques were used.    FINDINGS:   HEAD CT:   INTRACRANIAL CONTENTS: No intracranial hemorrhage, extraaxial collection, or mass effect.  No CT evidence of acute infarct. 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 Mild presumed chronic small vessel ischemic changes. Normal ventricles and   sulci.     VISUALIZED ORBITS/SINUSES/MASTOIDS: No intraorbital abnormality. No paranasal sinus mucosal disease. No middle ear  or mastoid effusion.    BONES/SOFT TISSUES: No acute abnormality.    CERVICAL SPINE CT:   VERTEBRA: Normal vertebral body heights and alignment. No fracture or posttraumatic subluxation.     CANAL/FORAMINA: Multilevel spondylosis without high grade canal stenosis.    PARASPINAL: No extraspinal abnormality. Visualized lung fields are clear.      Impression    IMPRESSION:  HEAD CT:  1.  No acute intracranial process.    CERVICAL SPINE CT:  1.  No CT evidence for acute fracture or post traumatic subluxation.   CT Cervical Spine w/o Contrast     Status: None    Narrative    EXAM: CT HEAD W/O CONTRAST, CT CERVICAL SPINE W/O CONTRAST  LOCATION: [facility]  DATE: 10/25/2024    INDICATION: Trauma on warfarin  COMPARISON: None.  TECHNIQUE:   1) Routine CT Head without IV contrast. Multiplanar reformats. Dose reduction techniques were used.  2) Routine CT Cervical Spine without IV contrast. Multiplanar reformats. Dose reduction techniques were used.    FINDINGS:   HEAD CT:   INTRACRANIAL CONTENTS: No intracranial hemorrhage, extraaxial collection, or mass effect.  No CT evidence of acute infarct. 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 Mild presumed chronic small vessel ischemic changes. Normal ventricles and   sulci.     VISUALIZED ORBITS/SINUSES/MASTOIDS: No intraorbital abnormality. No paranasal sinus mucosal disease. No middle ear or mastoid effusion.    BONES/SOFT TISSUES: No acute abnormality.    CERVICAL SPINE CT:   VERTEBRA: Normal vertebral body heights and alignment. No fracture or posttraumatic subluxation.     CANAL/FORAMINA: Multilevel spondylosis without high grade canal stenosis.    PARASPINAL: No extraspinal abnormality. Visualized lung fields are clear.      Impression    IMPRESSION:  HEAD CT:  1.  No acute intracranial process.    CERVICAL SPINE CT:  1.  No CT evidence for acute fracture or post traumatic subluxation.   CT Thoracic Spine w/o  Contrast     Status: None    Narrative    EXAM: CT THORACIC SPINE W/O CONTRAST, CT LUMBAR SPINE W/O CONTRAST  LOCATION: [facility]  DATE/TIME: 10/25/2024 6:48 AM CDT    INDICATION: Trauma  COMPARISON: None.  TECHNIQUE:   1) Routine CT Thoracic Spine without IV contrast. Multiplanar reformats. Dose reduction techniques were used.   2) Routine CT Lumbar Spine without IV contrast. Multiplanar reformats. Dose reduction techniques were used.     FINDINGS:     VERTEBRA: Normal vertebral body heights and alignment. No acute compression fracture or posttraumatic subluxation. Flowing ventral osteophytes throughout the thoracic spine in keeping with diffuse idiopathic skeletal hyperostosis.    CANAL/FORAMINA: No significant canal or neural foraminal stenosis.    PARASPINAL: Cholelithiasis.      Impression    IMPRESSION:    1.  No fracture or posttraumatic subluxation.  2.  Cholelithiasis.   CT Lumbar Spine w/o Contrast     Status: None    Narrative    EXAM: CT THORACIC SPINE W/O CONTRAST, CT LUMBAR SPINE W/O CONTRAST  LOCATION: [facility]  DATE/TIME: 10/25/2024 6:48 AM CDT    INDICATION: Trauma  COMPARISON: None.  TECHNIQUE:   1) Routine CT Thoracic Spine without IV contrast. Multiplanar reformats. Dose reduction techniques were used.   2) Routine CT Lumbar Spine without IV contrast. Multiplanar reformats. Dose reduction techniques were used.     FINDINGS:     VERTEBRA: Normal vertebral body heights and alignment. No acute compression fracture or posttraumatic subluxation. Flowing ventral osteophytes throughout the thoracic spine in keeping with diffuse idiopathic skeletal hyperostosis.    CANAL/FORAMINA: No significant canal or neural foraminal stenosis.    PARASPINAL: Cholelithiasis.      Impression    IMPRESSION:    1.  No fracture or posttraumatic subluxation.  2.  Cholelithiasis.   [place] Draw     Status: None    Narrative    The  following orders were created for panel order [place] Draw.  Procedure                               Abnormality         Status                     ---------                               -----------         ------                     Extra Blue Top Tube[[id number]]                              Final result               Extra Red Top Tube[[id number]]                               Final result               Extra Green Top (Lithium...[[id number]]                      Final result               Extra Purple Top Tube[[id number]]                            Final result                 Please view results for these tests on the individual orders.   Extra Blue Top Tube     Status: None   Result Value Ref Range    Hold Specimen JIC    Extra Red Top Tube     Status: None   Result Value Ref Range    Hold Specimen JIC    Extra Green Top (Lithium Heparin) Tube     Status: None   Result Value Ref Range    Hold Specimen JIC    Extra Purple Top Tube     Status: None   Result Value Ref Range    Hold Specimen JIC    INR     Status: Abnormal   Result Value Ref Range    INR 3.59 (H) 0.85 - 1.15   Comprehensive metabolic panel     Status: Abnormal   Result Value Ref Range    Sodium 135 135 - 145 mmol/L    Potassium 3.4 3.4 - 5.3 mmol/L    Carbon Dioxide (CO2) 21 (L) 22 - 29 mmol/L    Anion Gap 20 (H) 7 - 15 mmol/L    Urea Nitrogen 23.1 (H) 8.0 - 23.0 mg/dL    Creatinine 1.77 (H) 0.67 - 1.17 mg/dL    GFR Estimate 40 (L) >60 mL/min/1.73m2    Calcium 9.3 8.8 - 10.4 mg/dL    Chloride 94 (L) 98 - 107 mmol/L    Glucose 166 (H) 70 - 99 mg/dL    Alkaline Phosphatase 119 40 - 150 U/L    AST 27 0 - 45 U/L    ALT 12 0 - 70 U/L    Protein Total 7.1 6.4 - 8.3 g/dL    Albumin 3.6 3.5 - 5.2 g/dL    Bilirubin Total 0.7 <=1.2 mg/dL   CBC with platelets and differential     Status: Abnormal   Result Value Ref Range    WBC Count 7.2 4.0 - 11.0 10e3/uL    RBC Count 5.20 4.40 - 5.90 10e6/uL    Hemoglobin 11.9 (L) 13.3 - 17.7 g/dL    Hematocrit 40.2 40.0 -  53.0 %    MCV 77 (L) 78 - 100 fL    MCH 22.9 (L) 26.5 - 33.0 pg    MCHC 29.6 (L) 31.5 - 36.5 g/dL    RDW 18.0 (H) 10.0 - 15.0 %    Platelet Count 369 150 - 450 10e3/uL    % Neutrophils 69 %    % Lymphocytes 19 %    % Monocytes 10 %    % Eosinophils 0 %    % Basophils 1 %    % Immature Granulocytes 0 %    NRBCs per 100 WBC 0 <1 /100    Absolute Neutrophils 5.0 1.6 - 8.3 10e3/uL    Absolute Lymphocytes 1.4 0.8 - 5.3 10e3/uL    Absolute Monocytes 0.8 0.0 - 1.3 10e3/uL    Absolute Eosinophils 0.0 0.0 - 0.7 10e3/uL    Absolute Basophils 0.0 0.0 - 0.2 10e3/uL    Absolute Immature Granulocytes 0.0 <=0.4 10e3/uL    Absolute NRBCs 0.0 10e3/uL   Extra Tube     Status: None    Narrative    The following orders were created for panel order Extra Tube.  Procedure                               Abnormality         Status                     ---------                               -----------         ------                     Extra Green Top (Lithium...[[id number]]                      Final result               Extra Purple Top Tube[[id number]]                            Final result                 Please view results for these tests on the individual orders.   Extra Green Top (Lithium Heparin) Tube     Status: None   Result Value Ref Range    Hold Specimen JIC    Extra Purple Top Tube     Status: None   Result Value Ref Range    Hold Specimen JIC    CBC with platelets differential     Status: Abnormal    Narrative    The following orders were created for panel order CBC with platelets differential.  Procedure                               Abnormality         Status                     ---------                               -----------         ------                     CBC with platelets and d...[[id number]]  Abnormal            Final result                 Please view results for these tests on the individual orders.     Medications   sodium chloride 0.9% BOLUS 1,000 mL (1,000 mLs Intravenous $New Bag 10/25/24 1092)     Labs  Ordered and Resulted from Time of ED Arrival to Time of ED Departure   INR - Abnormal       Result Value    INR 3.59 (*)    COMPREHENSIVE METABOLIC PANEL - Abnormal    Sodium 135      Potassium 3.4      Carbon Dioxide (CO2) 21 (*)     Anion Gap 20 (*)     Urea Nitrogen 23.1 (*)     Creatinine 1.77 (*)     GFR Estimate 40 (*)     Calcium 9.3      Chloride 94 (*)     Glucose 166 (*)     Alkaline Phosphatase 119      AST 27      ALT 12      Protein Total 7.1      Albumin 3.6      Bilirubin Total 0.7     CBC WITH PLATELETS AND DIFFERENTIAL - Abnormal    WBC Count 7.2      RBC Count 5.20      Hemoglobin 11.9 (*)     Hematocrit 40.2      MCV 77 (*)     MCH 22.9 (*)     MCHC 29.6 (*)     RDW 18.0 (*)     Platelet Count 369      % Neutrophils 69      % Lymphocytes 19      % Monocytes 10      % Eosinophils 0      % Basophils 1      % Immature Granulocytes 0      NRBCs per 100 WBC 0      Absolute Neutrophils 5.0      Absolute Lymphocytes 1.4      Absolute Monocytes 0.8      Absolute Eosinophils 0.0      Absolute Basophils 0.0      Absolute Immature Granulocytes 0.0      Absolute NRBCs 0.0       CT Lumbar Spine w/o Contrast   Final Result   IMPRESSION:      1.  No fracture or posttraumatic subluxation.   2.  Cholelithiasis.      CT Thoracic Spine w/o Contrast   Final Result   IMPRESSION:      1.  No fracture or posttraumatic subluxation.   2.  Cholelithiasis.      CT Head w/o Contrast   Final Result   IMPRESSION:   HEAD CT:   1.  No acute intracranial process.      CERVICAL SPINE CT:   1.  No CT evidence for acute fracture or post traumatic subluxation.      CT Cervical Spine w/o Contrast   Final Result   IMPRESSION:   HEAD CT:   1.  No acute intracranial process.      CERVICAL SPINE CT:   1.  No CT evidence for acute fracture or post traumatic subluxation.      XR Pelvis and Hip Right 2 Views    (Results Pending)   XR Knee Right 3 Views    (Results Pending)          Critical care was not performed.     Medical Decision  Making  The patient's presentation was of high complexity (an acute health issue posing potential threat to life or bodily function).    The patient's evaluation involved:  review of external note(s) from 2 sources (prior ED notes, recent hospitalization and discharge summary)  ordering and/or review of 3+ test(s) in this encounter (see separate area of note for details)  independent interpretation of testing performed by another health professional (CT imaging, x-rays)  discussion of management or test interpretation with another health professional (general medicine team, morning provider)    The patient's management necessitated high risk (a decision regarding hospitalization) and further care after sign-out to morning provider (see their note for further management).    Assessment & Plan    Clarke Moreira is a 74 year old male who has a past medical history of T2DM w/ neuropathy and chronic ulcers, pAF, HFpEF, HLD, MDD, on CPAP and uses walker at home who presents to the emergency department for evaluation of a fall.  Upon arrival patient is nontoxic-appearing, afebrile, mild distress.  Patient complains of mid to low back pain, right hip and right knee pain after fall.  Patient denies loss of consciousness however is on chronic anticoagulation.  Upon arrival patient with initial blood pressure low at 84/51 with repeat 96/67 and repeat 100/64.  Given patient's trauma, history of anticoagulation, will obtain CT imaging of his head, C/T/L-spine as well as x-rays of his right hip and knee.  Patient denies any chest pain, abdominal pain, shortness of breath.  Patient states that he landed on his back and side, did not land on his front.    Comprehensive labs remarkable for white blood cell count of 7.2, hemoglobin 11.9, INR elevated at 3.59, anion gap elevated at 20, creatinine 1.77 (increased from prior 1.01 on 10/7/2024) patient was given IVF bolus.     I personally reviewed and interpreted CT head which is  unremarkable with no acute intracranial process, CT of the cervical/thoracic/lumbar spine with no acute fracture or posttraumatic subluxation.    Patient pending x-ray of the right hip, right knee.    Discussed patient management with internal medicine team given patient's recurrent fall, generalized weakness, acute kidney injury plan on admission to medicine team for further evaluation, PT/OT, likely placement.  Patient understands and agrees with the plan.    I have reviewed the nursing notes. I have reviewed the findings, diagnosis, plan and need for follow up with the patient.    New Prescriptions    No medications on file       Final diagnoses:   Fall, initial encounter   Acute midline low back pain without sciatica   Hip pain, right   Acute pain of right knee   Weakness   Subtherapeutic international normalized ratio (INR)   MICHELLE (acute kidney injury) (H)       Roopa Moreira MD  [facility] EMERGENCY DEPARTMENT  10/25/2024     Roopa Moreira MD  10/25/24 0706

## 2024-10-25 NOTE — PLAN OF CARE
Goal Outcome Evaluation:      Plan of Care Reviewed With: patient    Overall Patient Progress: no changeOverall Patient Progress: no change     This RN took over for patient at 12-3 today.  INR recheck 3.42.  BP up and down per order metoprolol held at noon for low BP.  MD notified and never responded to pages.  IL of fluid bolus given.  WO RN here to see pt.  Report given to Harmeet GERONIMO on [facility] unit 8 med surg.  Pt not safe to bear weight.  Pt had difficulty pivoting to EMS litter with heavy assist of 3 and gait belt  Pt left for with ems at 1515.

## 2024-10-26 ENCOUNTER — APPOINTMENT (OUTPATIENT)
Dept: GENERAL RADIOLOGY | Facility: CLINIC | Age: 74
End: 2024-10-26
Attending: INTERNAL MEDICINE
Payer: COMMERCIAL

## 2024-10-26 ENCOUNTER — APPOINTMENT (OUTPATIENT)
Dept: PHYSICAL THERAPY | Facility: CLINIC | Age: 74
End: 2024-10-26
Attending: STUDENT IN AN ORGANIZED HEALTH CARE EDUCATION/TRAINING PROGRAM
Payer: COMMERCIAL

## 2024-10-26 LAB
ANION GAP SERPL CALCULATED.3IONS-SCNC: 13 MMOL/L (ref 7–15)
BUN SERPL-MCNC: 16.3 MG/DL (ref 8–23)
CALCIUM SERPL-MCNC: 9.6 MG/DL (ref 8.8–10.4)
CHLORIDE SERPL-SCNC: 96 MMOL/L (ref 98–107)
CREAT SERPL-MCNC: 1.17 MG/DL (ref 0.67–1.17)
EGFRCR SERPLBLD CKD-EPI 2021: 65 ML/MIN/1.73M2
ERYTHROCYTE [DISTWIDTH] IN BLOOD BY AUTOMATED COUNT: 17.5 % (ref 10–15)
GLUCOSE BLDC GLUCOMTR-MCNC: 105 MG/DL (ref 70–99)
GLUCOSE BLDC GLUCOMTR-MCNC: 120 MG/DL (ref 70–99)
GLUCOSE BLDC GLUCOMTR-MCNC: 138 MG/DL (ref 70–99)
GLUCOSE BLDC GLUCOMTR-MCNC: 140 MG/DL (ref 70–99)
GLUCOSE SERPL-MCNC: 115 MG/DL (ref 70–99)
HCO3 SERPL-SCNC: 27 MMOL/L (ref 22–29)
HCT VFR BLD AUTO: 38 % (ref 40–53)
HGB BLD-MCNC: 12 G/DL (ref 13.3–17.7)
HOLD SPECIMEN: NORMAL
INR PPP: 3.4 (ref 0.85–1.15)
MAGNESIUM SERPL-MCNC: 1.9 MG/DL (ref 1.7–2.3)
MCH RBC QN AUTO: 23.9 PG (ref 26.5–33)
MCHC RBC AUTO-ENTMCNC: 31.6 G/DL (ref 31.5–36.5)
MCV RBC AUTO: 76 FL (ref 78–100)
PHOSPHATE SERPL-MCNC: 3.3 MG/DL (ref 2.5–4.5)
PLATELET # BLD AUTO: 282 10E3/UL (ref 150–450)
POTASSIUM SERPL-SCNC: 3 MMOL/L (ref 3.4–5.3)
RBC # BLD AUTO: 5.03 10E6/UL (ref 4.4–5.9)
SODIUM SERPL-SCNC: 136 MMOL/L (ref 135–145)
WBC # BLD AUTO: 6.6 10E3/UL (ref 4–11)

## 2024-10-26 PROCEDURE — 250N000013 HC RX MED GY IP 250 OP 250 PS 637: Performed by: INTERNAL MEDICINE

## 2024-10-26 PROCEDURE — 74018 RADEX ABDOMEN 1 VIEW: CPT | Mod: 26 | Performed by: STUDENT IN AN ORGANIZED HEALTH CARE EDUCATION/TRAINING PROGRAM

## 2024-10-26 PROCEDURE — 250N000013 HC RX MED GY IP 250 OP 250 PS 637

## 2024-10-26 PROCEDURE — G0378 HOSPITAL OBSERVATION PER HR: HCPCS

## 2024-10-26 PROCEDURE — 250N000013 HC RX MED GY IP 250 OP 250 PS 637: Performed by: STUDENT IN AN ORGANIZED HEALTH CARE EDUCATION/TRAINING PROGRAM

## 2024-10-26 PROCEDURE — 97161 PT EVAL LOW COMPLEX 20 MIN: CPT | Mod: GP

## 2024-10-26 PROCEDURE — 85610 PROTHROMBIN TIME: CPT | Performed by: STUDENT IN AN ORGANIZED HEALTH CARE EDUCATION/TRAINING PROGRAM

## 2024-10-26 PROCEDURE — 250N000013 HC RX MED GY IP 250 OP 250 PS 637: Performed by: PHYSICIAN ASSISTANT

## 2024-10-26 PROCEDURE — 99232 SBSQ HOSP IP/OBS MODERATE 35: CPT | Performed by: INTERNAL MEDICINE

## 2024-10-26 PROCEDURE — 74018 RADEX ABDOMEN 1 VIEW: CPT

## 2024-10-26 PROCEDURE — 84100 ASSAY OF PHOSPHORUS: CPT | Performed by: PHYSICIAN ASSISTANT

## 2024-10-26 PROCEDURE — 83735 ASSAY OF MAGNESIUM: CPT | Performed by: PHYSICIAN ASSISTANT

## 2024-10-26 PROCEDURE — 80048 BASIC METABOLIC PNL TOTAL CA: CPT | Performed by: PHYSICIAN ASSISTANT

## 2024-10-26 PROCEDURE — 97530 THERAPEUTIC ACTIVITIES: CPT | Mod: GP

## 2024-10-26 PROCEDURE — 73502 X-RAY EXAM HIP UNI 2-3 VIEWS: CPT

## 2024-10-26 PROCEDURE — 82962 GLUCOSE BLOOD TEST: CPT

## 2024-10-26 PROCEDURE — 36415 COLL VENOUS BLD VENIPUNCTURE: CPT | Performed by: PHYSICIAN ASSISTANT

## 2024-10-26 PROCEDURE — 85014 HEMATOCRIT: CPT | Performed by: PHYSICIAN ASSISTANT

## 2024-10-26 RX ORDER — WARFARIN SODIUM 2.5 MG/1
2.5 TABLET ORAL
Status: COMPLETED | OUTPATIENT
Start: 2024-10-26 | End: 2024-10-26

## 2024-10-26 RX ORDER — SODIUM PHOSPHATE,MONO-DIBASIC 19G-7G/118
1 ENEMA (ML) RECTAL ONCE
Status: COMPLETED | OUTPATIENT
Start: 2024-10-26 | End: 2024-10-27

## 2024-10-26 RX ORDER — CHLORHEXIDINE GLUCONATE ORAL RINSE 1.2 MG/ML
15 SOLUTION DENTAL 2 TIMES DAILY
Status: DISCONTINUED | OUTPATIENT
Start: 2024-10-26 | End: 2024-10-30 | Stop reason: HOSPADM

## 2024-10-26 RX ORDER — POTASSIUM CHLORIDE 1.5 G/1.58G
40 POWDER, FOR SOLUTION ORAL ONCE
Status: DISCONTINUED | OUTPATIENT
Start: 2024-10-26 | End: 2024-10-26

## 2024-10-26 RX ORDER — SENNOSIDES 8.6 MG
1 TABLET ORAL 2 TIMES DAILY
Status: DISCONTINUED | OUTPATIENT
Start: 2024-10-26 | End: 2024-10-30 | Stop reason: HOSPADM

## 2024-10-26 RX ORDER — POTASSIUM CHLORIDE 1.5 G/1.58G
20 POWDER, FOR SOLUTION ORAL ONCE
Status: DISCONTINUED | OUTPATIENT
Start: 2024-10-26 | End: 2024-10-26

## 2024-10-26 RX ORDER — POTASSIUM CHLORIDE 750 MG/1
40 TABLET, EXTENDED RELEASE ORAL ONCE
Status: COMPLETED | OUTPATIENT
Start: 2024-10-26 | End: 2024-10-26

## 2024-10-26 RX ORDER — POTASSIUM CHLORIDE 750 MG/1
20 TABLET, EXTENDED RELEASE ORAL ONCE
Status: COMPLETED | OUTPATIENT
Start: 2024-10-26 | End: 2024-10-26

## 2024-10-26 RX ADMIN — SENNOSIDES AND DOCUSATE SODIUM 1 TABLET: 50; 8.6 TABLET ORAL at 20:15

## 2024-10-26 RX ADMIN — Medication 25 MCG: at 08:05

## 2024-10-26 RX ADMIN — METOPROLOL SUCCINATE 100 MG: 100 TABLET, EXTENDED RELEASE ORAL at 08:04

## 2024-10-26 RX ADMIN — TAMSULOSIN HYDROCHLORIDE 0.4 MG: 0.4 CAPSULE ORAL at 08:05

## 2024-10-26 RX ADMIN — CHLORHEXIDINE GLUCONATE 0.12% ORAL RINSE 15 ML: 1.2 LIQUID ORAL at 20:15

## 2024-10-26 RX ADMIN — ATORVASTATIN CALCIUM 40 MG: 40 TABLET, FILM COATED ORAL at 08:05

## 2024-10-26 RX ADMIN — POTASSIUM CHLORIDE 40 MEQ: 750 TABLET, EXTENDED RELEASE ORAL at 20:14

## 2024-10-26 RX ADMIN — ASPIRIN 81 MG CHEWABLE TABLET 81 MG: 81 TABLET CHEWABLE at 08:04

## 2024-10-26 RX ADMIN — OXYCODONE HYDROCHLORIDE AND ACETAMINOPHEN 1000 MG: 500 TABLET ORAL at 08:04

## 2024-10-26 RX ADMIN — WARFARIN SODIUM 2.5 MG: 2.5 TABLET ORAL at 18:37

## 2024-10-26 RX ADMIN — Medication 1 TABLET: at 08:05

## 2024-10-26 RX ADMIN — LIDOCAINE 2 PATCH: 4 PATCH TOPICAL at 08:02

## 2024-10-26 RX ADMIN — Medication 1 SPRAY: at 20:15

## 2024-10-26 RX ADMIN — POTASSIUM CHLORIDE 20 MEQ: 750 TABLET, EXTENDED RELEASE ORAL at 22:37

## 2024-10-26 RX ADMIN — Medication 1 SPRAY: at 11:29

## 2024-10-26 RX ADMIN — LEVOTHYROXINE SODIUM 175 MCG: 175 TABLET ORAL at 08:05

## 2024-10-26 RX ADMIN — CHLORHEXIDINE GLUCONATE 0.12% ORAL RINSE 15 ML: 1.2 LIQUID ORAL at 08:02

## 2024-10-26 ASSESSMENT — ACTIVITIES OF DAILY LIVING (ADL)
ADLS_ACUITY_SCORE: 0
DEPENDENT_IADLS:: CLEANING;MEAL PREPARATION;TRANSPORTATION
ADLS_ACUITY_SCORE: 0

## 2024-10-26 NOTE — PROGRESS NOTES
"[facility]  OUTPATIENT PHYSICAL THERAPY EVALUATION  PLAN OF TREATMENT FOR OUTPATIENT REHABILITATION  (COMPLETE FOR INITIAL CLAIMS ONLY)  Patient's Last Name, First Name, M.I.  YOB: 1950  Clarke Moreira                        Provider's Name  [facility] Medical Record No.  [id number]                             Onset Date:  10/25/24   Start of Care Date:   10/26/24   Type:     _X_PT   ___OT   ___SLP Medical Diagnosis:                 PT Diagnosis:  impaired functional mobility Visits from SOC:  1     See note for plan of treatment, functional goals and certification details    I CERTIFY THE NEED FOR THESE SERVICES FURNISHED UNDER        THIS PLAN OF TREATMENT AND WHILE UNDER MY CARE     (Physician co-signature of this document indicates review and certification of the therapy plan).              10/26/24 1300   Appointment Info   Signing Clinician's Name / Credentials (PT) DAVNO Bowling   Student Supervision On-site supervision provided;Direct supervision provided;Line of sight supervision provided;Direct Patient Contact Provided;Therapy services provided with the co-signing licensed therapist guiding and directing the services, and providing the skilled judgement and assessment throughout the session   Living Environment   People in Home alone   Current Living Arrangements apartment   Home Accessibility no concerns  (no stair concerns)   Transportation Anticipated health plan transportation   Living Environment Comments Pt reports living on one level apartment. Has 2WW for community mobility and SPC in apt for navigation. Reports using walls and cane to assist with walking in apartment as the 2WW \"doesn't fit everywhere\"   Self-Care   Usual Activity Tolerance fair   Current Activity Tolerance poor   Equipment Currently Used at Home walker, standard;cane, straight;dressing device;hospital bed   Fall history within last six " "months yes   Number of times patient has fallen within last six months 3   Activity/Exercise/Self-Care Comment Pt reports IND with ADLs at baseline. Doesn't use the tub/shower anymore 2/2 fear of transferring. Completes sink baths in kitchen and BR. Reports his last full shower was in the hospital. Reports fall from legs collapsing when losing balance. Feels like lost strength in TCU.   General Information   Onset of Illness/Injury or Date of Surgery 10/25/24   Referring Physician Chaim Dolan MD   Patient/Family Therapy Goals Statement (PT) \"be able to go home and sell belongs and prepare to die\"  (charge nurse and provider informed of pt statement)   Pertinent History of Current Problem (include personal factors and/or comorbidities that impact the POC) Clarke Moreira is a 74 year old male with history of DMII, CKD, HFpEF, A fib, depression, THONG, chronic anemia, hypothyroidism, HLD, and BPH who was admitted to [facility] 10/25/2024 with mechanical fall, MICHELLE, and inability to care for himself. PTA fell walking to the kitchen due to knees buckling. No LOC, did not hit head.  Of note, recently admitted to [facility] 9/22 to 10/3 with similar presentation and was found to have citrobacter bacteremia. Recently discharged from TCU but struggled to care for self, perform ADLs, and limited PO intake since discharge.   Existing Precautions/Restrictions fall   General Observations Pt supine in bed, pleasant, reports pain from waist down   Cognition   Affect/Mental Status (Cognition) WFL   Pain Assessment   Patient Currently in Pain Yes, see Vital Sign flowsheet  (reports pain frm waist down)   Integumentary/Edema   Integumentary/Edema Comments Small wounds/scrapes and bruising on LE   Posture    Posture Forward head position   Range of Motion (ROM)   Range of Motion ROM deficits secondary to pain;ROM deficits secondary to weakness   Strength (Manual Muscle Testing)   Strength (Manual Muscle Testing) Deficits observed during " functional mobility   Strength Comments LE weakness   Bed Mobility   Bed Mobility supine-sit   Supine-Sit [place] (Bed Mobility) minimum assist (75% patient effort);2 person assist   Assistive Device (Bed Mobility) bed rails   Comment, (Bed Mobility) Supine>sit w minAx2 and bed rails   Transfers   Transfers sit-stand transfer   Sit-Stand Transfer   Sit-Stand [place] (Transfers) moderate assist (50% patient effort)   Assistive Device (Sit-Stand Transfers) walker, front-wheeled   Comment, (Sit-Stand Transfer) modAx2 w/ FWW for STS   Gait/Stairs (Locomotion)   [place] Level (Gait) not tested   Comment, (Gait/Stairs) not tested due to pts pain and LE weakness   Balance   Balance Comments impaired standing balace due to weakness and absent sensation in feet B   Sensory Examination   Sensory Perception Comments Reports absent sensation B in feet. Otherwise sharp pain waist and lower.   Clinical Impression   Criteria for Skilled Therapeutic Intervention Yes, treatment indicated   PT Diagnosis (PT) impaired functional mobility   Influenced by the following impairments pain, limited strength, decreased activity tolerance   Functional limitations due to impairments bed mobility, transfers, ambulation   Clinical Presentation (PT Evaluation Complexity) stable   Clinical Presentation Rationale per clinical judgement   Clinical Decision Making (Complexity) low complexity   Planned Therapy Interventions (PT) bed mobility training;gait training;home exercise program;patient/family education;strengthening;transfer training;ROM (range of motion)   Risk & Benefits of therapy have been explained evaluation/treatment results reviewed;care plan/treatment goals reviewed;risks/benefits reviewed;current/potential barriers reviewed;participants voiced agreement with care plan;participants included;patient   Clinical Impression Comments Pt deconditioned, limited mobility due to pain and LE weakness.   PT Total Evaluation Time    PT Eval, Low Complexity Minutes (88204) 4   Physical Therapy Goals   PT Frequency 5x/week   PT Predicted Duration/Target Date for Goal Attainment 11/09/24   PT Goals Bed Mobility;Transfers;Gait   PT: Bed Mobility Modified independent;Supine to/from sit;Rolling   PT: Transfers Modified independent;Sit to/from stand;Assistive device   PT: Gait Modified independent;Assistive device;Rolling walker;100 feet   Interventions   Interventions Quick Adds Therapeutic Activity   Therapeutic Activity   Therapeutic Activities: dynamic activities to improve functional performance Minutes (41471) 41   Symptoms Noted During/After Treatment Increased pain   Treatment Detail/Skilled Intervention Pt supine in bed upon arrival nursing present, agreeable to PT. After subjective eval pt completes supine>sit. Requires minAx2 to guide roll, position LE and trunk. Once sitting pt requests a few moments to catch breath and needs UE support at all time while sitting. Pt attempts STS w/ modAx2 and FWW. Pt unable to fully stand with VC and assistx2 before sitting back EOB.  Nursing approaches and reports need to take pt to X-ray. Increased time finding SS, pt seated EOB w/SBA. Pt positioned in SS, reporting LE are uncomfortable and in pain. Pt completes STS in SS w/ modAx2, VC to grab and pull through bar and paddles put in place once standing. Pt tolerates  SS and transfered to transfer bed. Pt completes stand>sitx2 to get hips back onto bed w/ CGAx2. Pt performs sit>supine w/ modAx2 for LE and shoulder positioning. Then patient boosted down via transfer sheet and maxAx2. Pt left w/nursing to transport to X-ray.   PT Discharge Planning   PT Plan STS w/ FWW, LE strengthening, pre-amb exercises   PT Discharge Recommendation (DC Rec) Transitional Care Facility   PT Rationale for DC Rec Pt presents below baseline with current fall, reporting himself he was not at the TCU long enough. Pt currently requires Ax2 for mobility, limited by  reduced activity tolerance, pain and weakness.  Anticipate pt will progress and be able to tolerate more. Pt willing to participate. At this time recommend discharge to TCU to progress safety and IND with functional mobility prior to returning home.   PT Brief overview of current status Ax1-2 with bed mobility and transfers   Physical Therapy Time and Intention   Timed Code Treatment Minutes 41   Total Session Time (sum of timed and untimed services) 45

## 2024-10-26 NOTE — CONSULTS
Care Management Initial Consult    General Information  Assessment completed with: Patient,    Type of CM/SW Visit: Initial Assessment    Primary Care Provider verified and updated as needed: Yes   Readmission within the last 30 days: previous discharge plan unsuccessful   Return Category: Medically unsuccessful treatment plan  Reason for Consult: discharge planning  Advance Care Planning: Advance Care Planning Reviewed: no concerns identified          Communication Assessment  Patient's communication style: spoken language (English or Bilingual)    Hearing Difficulty or Deaf: no   Wear Glasses or Blind: yes    Cognitive  Cognitive/Neuro/Behavioral: WDL                      Living Environment:   People in home: alone     Current living Arrangements: apartment      Able to return to prior arrangements: no (Requires additional support)       Family/Social Support:  Care provided by: self  Provides care for: no one, unable/limited ability to care for self  Marital Status: Single  Support system: Limited to, Friend (Has 3 friends, but very limited contact with them.)          Description of Support System: Uninvolved    Support Assessment: Lacks adequate physical care, Lacks adequate emotional support, Limited social contact and support, Difficulty establishing and maintaining relationships    Current Resources:   Patient receiving home care services: No        Community Resources: Meals on Wheels, Transportation Services, Volunteer ([employer], [employer] [place], [place] [employer])  Equipment currently used at home: walker, standard, cane, straight, dressing device, hospital bed  Supplies currently used at home: Compression Stockings, Wound Care Supplies    Employment/Financial:  Employment Status: retired        Financial Concerns: insurance inadequate   Referral to Financial Worker: Yes       Does the patient's insurance plan have a 3 day qualifying hospital stay waiver?  Yes     Which insurance  plan 3 day waiver is available? Alternative insurance waiver    Will the waiver be used for post-acute placement? Undetermined at this time    Lifestyle & Psychosocial Needs:  Social Drivers of Health     Food Insecurity: Low Risk  (10/25/2024)    Food Insecurity     Within the past 12 months, did you worry that your food would run out before you got money to buy more?: No     Within the past 12 months, did the food you bought just not last and you didn t have money to get more?: No   Depression: Not at risk (7/11/2024)    PHQ-2     PHQ-2 Score: 0   Housing Stability: Low Risk  (10/25/2024)    Housing Stability     Do you have housing? : Yes     Are you worried about losing your housing?: No   Recent Concern: Housing Stability - High Risk (9/25/2024)    Housing Stability     Do you have housing? : No     Are you worried about losing your housing?: No   Tobacco Use: Low Risk  (7/31/2024)    Patient History     Smoking Tobacco Use: Never     Smokeless Tobacco Use: Never     Passive Exposure: Not on file   Financial Resource Strain: Low Risk  (10/25/2024)    Financial Resource Strain     Within the past 12 months, have you or your family members you live with been unable to get utilities (heat, electricity) when it was really needed?: No   Alcohol Use: Not on file   Transportation Needs: Low Risk  (10/25/2024)    Transportation Needs     Within the past 12 months, has lack of transportation kept you from medical appointments, getting your medicines, non-medical meetings or appointments, work, or from getting things that you need?: No   Physical Activity: Not on file   Interpersonal Safety: Low Risk  (9/25/2024)    Interpersonal Safety     Do you feel physically and emotionally safe where you currently live?: Yes     Within the past 12 months, have you been hit, slapped, kicked or otherwise physically hurt by someone?: No     Within the past 12 months, have you been humiliated or emotionally abused in other ways by your  partner or ex-partner?: No   Stress: Not on file   Social Connections: Not on file   Health Literacy: Not on file       Functional Status:  Prior to admission patient needed assistance:   Dependent ADLs:: Ambulation-walker  Dependent IADLs:: Cleaning, Meal Preparation, Transportation  Assesssment of Functional Status: Not at baseline with ADL Functioning, Not at baseline with mobility, Not at  functional baseline, Needs placement in a SNF/TCF for rehabilitation, Needs assistance with laundry/houskeeping, Needs assistance with meals, Needs assistance with bathing, Needs assistance with shopping, Needs assistance with transportation    Mental Health Status:  Mental Health Status: Current Concern  Mental Health Management: Individual Therapy    Chemical Dependency Status:  Chemical Dependency Status: No Current Concerns             Values/Beliefs:  Spiritual, Cultural Beliefs, Congregation Practices, Values that affect care: no               Discussed  Partnership in Safe Discharge Planning  document with patient/family: Yes: No questions noted.          Additional Information:  Met with patient at bedside to introduce role of RNCC, complete initial assessment and discuss discharge planning.    Per MD note:  Clarke Moreira is a 74 year old male with history of DMII, CKD, HFpEF, A fib, depression, THONG, chronic anemia, hypothyroidism, HLD, and BPH who was admitted to [facility] 10/25/2024 with mechanical fall, MICHELLE, and inability to care for himself. PTA fell walking to the kitchen due to knees buckling. No LOC, did not hit head. Of note, recently admitted to [facility] 9/22 to 10/3 with similar presentation and was found to have citrobacter bacteremia. Recently discharged from TCU but struggled to care for self, perform ADLs, and limited PO intake since discharge.     Clarke lives alone in an apartment in [place]. His support system is very limited, and includes only 3 individuals, who do not live locally, and with whom he  "converses only a few times per year. He states that \"his mental health status precludes him from sustaining long-term relationships.\" He receives frozen meal delivery from [facility], and some limited volunteer services from [facility]. He also notes that he has been unable to bathe in his apartment for several years as he cannot lift his leg over the side of the bathtub.    According to the patient, the 18 days he spent in [facility] from 10/03/24 to 10/21/24 were \"hell on earth.\" He noted that the facility was \"the most unhygienic healthcare facility\" he'd ever seen, and expressed that the food was \"inedible\" and \"unhealthy.\" He notes that although the staff were friendly and provided the best care they could, the facility was \"horribly understaffed\" and unable to follow through with the rehabilitation services he expected. He states that he really enjoyed working with his Physical Therapists, but was only able to get 30 minutes of therapy per day, and there were several days when he didn't get therapy at all.    Patient reports that he was much weaker than he realized upon discharge from [facility]. He was only able to prepare one frozen meal for himself during the 4 day period between TCU discharge and returning to the hospital. He verbalizes feeling overwhelmed and discouraged by his inability to complete housekeeping tasks. According to the patient, there is debris from recent construction inside of his apartment, but he doesn't know how he'll be able to clean it up by himself with his current lack of physical strength.    Although Clarke doesn't make note of specific financial concerns, he does express worry about the possible inability to pay for medical services, and fear that this would lead to him becoming homeless. He also notes a great deal of anxiety surrounding his bank, as they no longer allow him to pay rent and other bills via telephone. He does not use " online banking, but he does have an iPad at home.      Next Steps: Await therapy recommendations for discharge disposition. Financial counseling referral placed for potential discharge barriers related to insurance (facility placement). Provide patient with resources for low cost/no cost/sliding-scale housekeeping/chore services.    Care coordination team will continue to follow for assistance in the development of a safe discharge plan for the patient.         KYLE OGDEN RN    10/26/2024  Nurse Coordinator      Social Work and Care Management Department       SEARCHABLE in [facility] - search CARE COORDINATOR       [place] [facility] [place] ([phone]) Saturday & Sunday; ([phone]) [facility] Recognized Holidays     Units: 5A Onc 5201 - [phone] RNCC,  5A Onc 5220 thru 5240 RNCC, 5C OFFSERVICE [id number] RNCC & 5C OFF SERVICE [phone] RNCC       Units: 6B Vocera, 6C Card 6401 thru 6420 RNCC, 6C Card 6502 thru 6514 RNCC & 6C Card 6515 thru 6519 RNCC        Units: 7A SOT RNCC Vocera, 7B Med Surg Vocera, 7C Med Surg 7401 thru 7418 RNCC & 7C Med Surg 7502 thru 7521 RNCC       Units: 6A Vocera & 4A CVICU Vocera, 4C MICU Vocera, and 4E SICU Vocera         Units: 5 Ortho Vocera & 5 Med Surg Vocera        Units: 6 Med Surg Vocera & 8 Med Surg Vocera

## 2024-10-26 NOTE — PROGRESS NOTES
Brief Medicine Progress Note    Patient transferred from  where he presented for mechanical fall, MICHELLE, and FTT. Chart reviewed VSS.   - Continue current POC    BP (!) 139/90 (BP Location: Right arm, Patient Position: Right side, Cuff Size: Adult Regular)   Pulse 107   Temp 98.1  F (36.7  C) (Oral)   Resp 20   Ht 1.829 m (6')   Wt (!) 155.7 kg (343 lb 4.1 oz)   SpO2 99%   BMI 46.55 kg/m      Elena Chapa PA-C  Internal Medicine Hospitalist Service  [facility]  Pager: [phone]

## 2024-10-26 NOTE — PLAN OF CARE
Goal Outcome Evaluation:      Plan of Care Reviewed With: patient    Overall Patient Progress: improvingOverall Patient Progress: improving       Problem: Adult Inpatient Plan of Care  Goal: Plan of Care Review  Description: The Plan of Care Review/Shift note should be completed every shift.  The Outcome Evaluation is a brief statement about your assessment that the patient is improving, declining, or no change.  This information will be displayed automatically on your shift  note.  Outcome: Progressing  Flowsheets (Taken 10/26/2024 0351)  Plan of Care Reviewed With: patient  Overall Patient Progress: improving       Patient alert and oriented x4. On 2 ltr of oxygen via NC when sleeping, Voids spontaneously. Rt PIV- SL. Able to make needs known. Slept through this shift. Denied pain or any discomfort. Poop sample sent, Voiding adequately without concern. No SOB or chest pain voiced. Safety measures in place. Call light within reach. Continue with plan of care.

## 2024-10-26 NOTE — PROGRESS NOTES
[facility]    Medicine Progress Note - Hospitalist Service, GOLD TEAM 16    Date of Admission:  10/25/2024    Assessment & Plan     Clarke Moreira is a 74 year old male with history of DMII, CKD, HFpEF, A fib, depression, THONG, chronic anemia, hypothyroidism, HLD, and BPH who was admitted to [facility] 10/25/2024 with mechanical fall, MICHELLE, and inability to care for himself. PTA fell walking to the kitchen due to knees buckling. No LOC, did not hit head.  Of note, recently admitted to [facility] 9/22 to 10/3 with similar presentation and was found to have citrobacter bacteremia. Recently discharged from TCU but struggled to care for self, perform ADLs, and limited PO intake since discharge.      Mechanical fall  Lower back pain    Bilateral knee  hip pain   -Head CT and cervical/thoracic/lumbar spine CT no acute findings.   -XR R knee and pelvis no acute findings.   - complains of  left hip pain post fall, check XR    - UA negative for signs of infection   - BCx x1 given recent bacteremia  - PT, OT, nutrition consults   - Pain management with Tylenol and Lidoderm patches     Hypotension:   -BP 80s-110s/50s-70s in the ED, responded to IV fluids    - Encourage oral intake  - Infectious workup   - Orthostatic pressures  - Fall precautions     Failure to thrive  - ha snot had good appetite, not able to cook an dcare for himself  - nutritionist to see  - physical therapy  Occupational therapy    -      Hypertension    -blood pressure  improved  so metoprolol  resumed with holding parameters      HFpEF:   -Last echo 3/2021 technically difficult, EF 50-55%, global RV function mildly reduced with mild RV dilation. PTA on Metoprolol, Spironolactone, Torsemide, KCl.   - Hold PTA torsemide , spironolactone for now,   - Echo  10/25:  EF 55-60%       Paroxysmal A fib  Long term anticoagulation   -CHADSVASC 5. PTA on Coumadin and Metoprolol.   - Coumadin per pharmacy  - Continue  "metoprolol with hold parameters     Elevated troponin  - trop flat  - no symptoms   - EKG atrial fibrillation  no acute st t wave changes     Chronic hypercapnic respiratory failure   THONG:   -Continue CPAP   - no current issues     AGMA:  - resolved      MICHELLE on CKD III:   -baseline Cr around 1. Elevated to 1.77 10/25, BUN 23.1. Likely prerenal in the setting of poor oral intake.   - received IV fluids  and now creatinine close to baseline   -  UA/UC  - CK 75   - avoid nephrotoxic agents     Constipation  - states has not had BM since admitted to TCU, did have some watery stool Thursday that could have been overflow diarrhea   - check abdominal XR for stool burden and if ha slots of stool then needs aggressive bowel  regime         T2DM with neuropathy :   - ling standing left lateral thigh numbness  -A1c 6.2 10/7. PTA on Metformin only.   - Hold Metformin but restart once taking in oral reliably   - MSSI, hypoglycemia protocol         HLD:  - Continue PTA ASA and statin    Depression, anxiety :   No PTA meds.  -feels very depressed, also feels anxious   - will ask psych to see on Monday     Chronic microcytic anemia:   - baseline  hgb 10.5-12 and stable.   - Iron low to 14 9/22/2024.   -PTA on iron, continue, was started on last admssion  - out patient  GI work up      Hypothyroidism:   TSH 0.93 10/7/24. Continue synthroid       Osteopenia on XR  - defer back to PMD   - vit d levels were nl last admisiosn      BPH:   Continue Flomax    Chronic wounds:   Per notes, 2/2 DM. [facility] consulted    Peridontal disease  -  during last admission was seen by dentistry,  on evaluation \"possible root tip maxillary left second molar (#15). Dentistry recs:- start chlorhexidine oral rinse BID for 4-8 weeks  - follow up with hospital dental clinic in [facility] for further evaluation.\"     MDD  CHRIS  - not on medications PTA      Diet:  Regular  DVT Prophylaxis: Coumadin per pharmacy, mechanical   Bazzi Catheter: Not present  Lines: " None     Cardiac Monitoring: None  Code Status:  DNR/DNI, pressors ok    Clinically Significant Risk Factors Present on Admission           # Hypochloremia: Lowest Cl = 94 mmol/L in last 2 days, will monitor as appropriate     # Anion Gap Metabolic Acidosis: Highest Anion Gap = 20 mmol/L in last 2 days, will monitor and treat as appropriate   # Drug Induced Coagulation Defect: home medication list includes an anticoagulant medication  # Drug Induced Platelet Defect: home medication list includes an antiplatelet medication  # Acute Kidney Injury, unspecified: based on a >150% or 0.3 mg/dL increase in last creatinine compared to past 90 day average, will monitor renal function  # Hypertension: Noted on problem list     # Severe Obesity: Estimated body mass index is 48.82 kg/m  as calculated from the following:    Height as of this encounter: 1.829 m (6').    Weight as of this encounter: 163.3 kg (360 lb).       # Financial/Environmental Concerns:         Disposition Plan      Medically Ready for Discharge: Anticipated in 2-4 Days        Diet: Combination Diet Regular Diet Adult    DVT Prophylaxis: Warfarin  Bazzi Catheter: Not present  Lines: None     Cardiac Monitoring: None  Code Status: No CPR- Do NOT Intubate      Clinically Significant Risk Factors Present on Admission          # Hypochloremia: Lowest Cl = 94 mmol/L in last 2 days, will monitor as appropriate     # Anion Gap Metabolic Acidosis: Highest Anion Gap = 20 mmol/L in last 2 days, will monitor and treat as appropriate   # Drug Induced Coagulation Defect: home medication list includes an anticoagulant medication  # Drug Induced Platelet Defect: home medication list includes an antiplatelet medication  # Acute Kidney Injury, unspecified: based on a >150% or 0.3 mg/dL increase in last creatinine compared to past 90 day average, will monitor renal function  # Hypertension: Noted on problem list  # Chronic heart failure with preserved ejection fraction: heart  failure noted on problem list and last echo with EF >50%        # Severe Obesity: Estimated body mass index is 46.55 kg/m  as calculated from the following:    Height as of this encounter: 1.829 m (6').    Weight as of this encounter: 155.7 kg (343 lb 4.1 oz).       # Financial/Environmental Concerns:           Disposition Plan     Medically Ready for Discharge: Anticipated in 2-4 Days           Lucila Sorensen MD  Hospitalist Service, GOLD TEAM 16  [facility]  Securely message with [employer] (more info)  Text page via [facility] Paging/Directory   See signed in provider for up to date coverage information  ______________________________________________________________________    Interval History   Doing ok, having lower back pain also LEFT hip pain , numbness on later part of thigh but later turns out this has been there for 40-50 years.  Also tells me ha snot had a good BM, last one was when entered TCU Oct 3, had very loose stool thursday,  He has not been able to take care of himself at home  , has fallen few times, not eating well, cannot cook for himself   He also feels very depressed and anxious     Physical Exam   Vital Signs: Temp: 98.1  F (36.7  C) Temp src: Tympanic BP: 108/56 Pulse: 91   Resp: 18 SpO2: 99 % O2 Device: Nasal cannula Oxygen Delivery: 2 LPM  Weight: 343 lbs 4.1 oz  General appearence: awake alert  in  no apparent distress    RESPIRATORY: lungs clear    CARDIOVASCULAR:S1 S2 irreg   GASTROINTESTINAL:soft, non-distended , non-tender , + bowel sounds,    SKIN: warm and dry, no mottling noted , has scrapes over left knee   NEUROLOGIC; awake alert and oriented,   EXTREMITIES: no clubbing, cyanosis, +  edema         Data     I have personally reviewed the following data over the past 24 hrs:    INR:  3.48 (H) PTT:  N/A   D-dimer:  N/A Fibrinogen:  N/A       Imaging results reviewed over the past 24 hrs:   Recent Results (from the past 24 hours)   XR Knee  Right 1/2 Views    Narrative    2 views right knee radiographs 10/25/2024 8:13 AM    History: Trauma    Additional History from EMR: Walking to the kitchen today with a  walker and felt knees buckle causing a fall.    Comparison: Bilateral knee x-ray 9/22/2024    Findings:  AP and lateral views of the right knee were obtained.     No acute bony abnormality  Fibular head irregularity, similar to  9/20/2024. No joint effusion.    Mild to moderate medial and mild lateral joint space narrowing. Tibial  spine sharpening.    Soft tissue is unremarkable.      Impression    Impression:  1. No acute bony abnormality. Similar irregular appearance of the  fibular head compared to 9/22/2024.  2. Mild to moderate degenerative changes of the knee.    I have personally reviewed the examination and initial interpretation  and I agree with the findings.    JERMAINE GARCIA MD (Joe)         SYSTEM ID:  [id number]   XR Pelvis w Hip Right 1 View    Narrative    EXAM: XR PELVIS AND HIP RIGHT 1 VIEW, 10/25/2024 8:13 AM    History: Trauma    Comparison: CT AP 9/22/2024    Findings:  AP and right frog-leg lateral view(s) of the pelvis were obtained.  Cranial portion of the iliac crest are not seen within the  field-of-view.    No acute osseous abnormality.    Mild bilateral hip joint space loss with marginal acetabular  osteophytosis.     Sacrum and innominate bones are partially obscured by overlying bowel  gas/fecal content.    Pelvic phlebolith. Pelvic and trochanteric enthesopathy. Lower lumbar  degeneration.      Impression    Impression:  1. The iliac crests are not completely visualized. Otherwise, no acute  bony abnormality.  2. Mild to moderate right and mild left hip joint space narrowing.    I have personally reviewed the examination and initial interpretation  and I agree with the findings.    JERMAINE GARCIA MD (Joe)         SYSTEM ID:  [id number]   Echo Complete   Result Value    LVEF  55-60%    Narrative     [id number]  [id number]  [id number]  [id number]^STEPHANY^OCTAVIO^JESICA     [facility],[place]  Echocardiography Laboratory  [address] [place] [id number]     Name: AJIT CALLAHAN  MRN: [id number]  : 1950  Study Date: 10/25/2024 10:48 AM  Age: 74 yrs  Gender: Male  Patient Location: [facility]  Reason For Study: Dyspnea  Ordering Physician: OCTAVIO HAMMOND  Performed By: Rachel Berry ELLEN     BSA: 2.7 m2  Height: 72 in  Weight: 343 lb  HR: 89  BP: 104/50 mmHg  ______________________________________________________________________________  Procedure  Complete Portable Echo Adult. Contrast Definity. Technically difficult  study.Extremely poor acoustic windows. Definity (NDC #16532-015-65) given  intravenously. Patient was given 6ml mixture of 1.5ml Definity and 8.5ml  saline. 4 ml wasted.  ______________________________________________________________________________  Interpretation Summary  Technically difficult study.Extremely poor acoustic windows.  Global and regional left ventricular function is normal with an EF of 55-60%.  Right ventricular function, chamber size, wall motion, and thickness are  normal.  Pulmonary artery systolic pressure is normal.  The inferior vena cava is normal.  No pericardial effusion is present.  ______________________________________________________________________________  Left Ventricle  Global and regional left ventricular function is normal with an EF of 55-60%.  Left ventricular wall thickness is normal. Left ventricular size is normal.  Diastolic function not assessed due to arrhythmia. No regional wall motion  abnormalities are seen.     Right Ventricle  Right ventricular function, chamber size, wall motion, and thickness are  normal.     Atria  The atria cannot be assessed.     Mitral Valve  The mitral valve is normal. Trace mitral insufficiency is present.     Aortic Valve  The valve leaflets are not well visualized. On Doppler  interrogation, there is  no significant stenosis or regurgitation.     Tricuspid Valve  The tricuspid valve is normal. Trace to mild tricuspid insufficiency is  present. The right ventricular systolic pressure is approximated at 27.2 mmHg  plus the right atrial pressure. Pulmonary artery systolic pressure is normal.     Pulmonic Valve  The valve leaflets are not well visualized. On Doppler interrogation, there is  no significant stenosis or regurgitation.     Vessels  The thoracic aorta is normal. The pulmonary artery cannot be assessed. The  inferior vena cava is normal.     Pericardium  No pericardial effusion is present.     ______________________________________________________________________________  MMode/2D Measurements & Calculations  IVSd: 0.92 cm  LVIDd: 4.4 cm  LVIDs: 3.2 cm  LVPWd: 0.83 cm  FS: 28.0 %     LV mass(C)d: 124.2 grams  LV mass(C)dI: 46.4 grams/m2  Ao root diam: 3.0 cm  asc Aorta Diam: 3.7 cm  LVOT diam: 2.3 cm  LVOT area: 4.3 cm2     EF(MOD-bp): 53.1 %  Ao root diam index Ht(cm/m): 1.6  Ao root diam index BSA (cm/m2): 1.1  Asc Ao diam index BSA (cm/m2): 1.4  Asc Ao diam index Ht(cm/m): 2.0  RWT: 0.37  TAPSE: 1.1 cm     Doppler Measurements & Calculations  MV E max luan: 96.1 cm/sec  PA acc time: 0.07 sec  TR max luan: 260.9 cm/sec  TR max P.2 mmHg     RV S Luan: 10.9 cm/sec     ______________________________________________________________________________  Report approved by: Niels Mederos 10/25/2024 12:37 PM

## 2024-10-26 NOTE — PLAN OF CARE
Goal Outcome Evaluation:      Plan of Care Reviewed With: patient    Overall Patient Progress: no change    O2: On RA   Output: Continent of B/B   Last BM: No BM this shift.    Activity: Assist x3   Skin: X redness and bruising on BLE   Pain: Hip pain with movement, did not want any meds for that   CMS:/Neuro: Alert and oriented x4, able to communicate needs   Diet: Regular   LDA: R PIV, saline locked   Equipment: WW   Plan: Con't POC.    Additional Info: Pt is calm and cooperative with care. Got an order for enema, incoming nurse to follow-up.

## 2024-10-27 LAB
ANION GAP SERPL CALCULATED.3IONS-SCNC: 9 MMOL/L (ref 7–15)
BUN SERPL-MCNC: 11.5 MG/DL (ref 8–23)
CALCIUM SERPL-MCNC: 9 MG/DL (ref 8.8–10.4)
CHLORIDE SERPL-SCNC: 97 MMOL/L (ref 98–107)
CREAT SERPL-MCNC: 1.06 MG/DL (ref 0.67–1.17)
EGFRCR SERPLBLD CKD-EPI 2021: 74 ML/MIN/1.73M2
ERYTHROCYTE [DISTWIDTH] IN BLOOD BY AUTOMATED COUNT: 17.3 % (ref 10–15)
GLUCOSE BLDC GLUCOMTR-MCNC: 105 MG/DL (ref 70–99)
GLUCOSE BLDC GLUCOMTR-MCNC: 115 MG/DL (ref 70–99)
GLUCOSE BLDC GLUCOMTR-MCNC: 129 MG/DL (ref 70–99)
GLUCOSE BLDC GLUCOMTR-MCNC: 147 MG/DL (ref 70–99)
GLUCOSE SERPL-MCNC: 129 MG/DL (ref 70–99)
HCO3 SERPL-SCNC: 29 MMOL/L (ref 22–29)
HCT VFR BLD AUTO: 36.4 % (ref 40–53)
HGB BLD-MCNC: 11.1 G/DL (ref 13.3–17.7)
HOLD SPECIMEN: NORMAL
INR PPP: 2.52 (ref 0.85–1.15)
MCH RBC QN AUTO: 23.6 PG (ref 26.5–33)
MCHC RBC AUTO-ENTMCNC: 30.5 G/DL (ref 31.5–36.5)
MCV RBC AUTO: 77 FL (ref 78–100)
PLATELET # BLD AUTO: 278 10E3/UL (ref 150–450)
POTASSIUM SERPL-SCNC: 3.5 MMOL/L (ref 3.4–5.3)
POTASSIUM SERPL-SCNC: 3.6 MMOL/L (ref 3.4–5.3)
RBC # BLD AUTO: 4.7 10E6/UL (ref 4.4–5.9)
SODIUM SERPL-SCNC: 135 MMOL/L (ref 135–145)
WBC # BLD AUTO: 8.9 10E3/UL (ref 4–11)

## 2024-10-27 PROCEDURE — 250N000013 HC RX MED GY IP 250 OP 250 PS 637: Performed by: STUDENT IN AN ORGANIZED HEALTH CARE EDUCATION/TRAINING PROGRAM

## 2024-10-27 PROCEDURE — 80048 BASIC METABOLIC PNL TOTAL CA: CPT | Performed by: STUDENT IN AN ORGANIZED HEALTH CARE EDUCATION/TRAINING PROGRAM

## 2024-10-27 PROCEDURE — 84132 ASSAY OF SERUM POTASSIUM: CPT | Performed by: PHYSICIAN ASSISTANT

## 2024-10-27 PROCEDURE — G0378 HOSPITAL OBSERVATION PER HR: HCPCS

## 2024-10-27 PROCEDURE — 250N000013 HC RX MED GY IP 250 OP 250 PS 637

## 2024-10-27 PROCEDURE — 250N000013 HC RX MED GY IP 250 OP 250 PS 637: Performed by: INTERNAL MEDICINE

## 2024-10-27 PROCEDURE — 99207 PR NO BILLABLE SERVICE THIS VISIT: CPT | Performed by: INTERNAL MEDICINE

## 2024-10-27 PROCEDURE — 82962 GLUCOSE BLOOD TEST: CPT

## 2024-10-27 PROCEDURE — 250N000013 HC RX MED GY IP 250 OP 250 PS 637: Performed by: PHYSICIAN ASSISTANT

## 2024-10-27 PROCEDURE — 36415 COLL VENOUS BLD VENIPUNCTURE: CPT | Performed by: STUDENT IN AN ORGANIZED HEALTH CARE EDUCATION/TRAINING PROGRAM

## 2024-10-27 PROCEDURE — 85610 PROTHROMBIN TIME: CPT | Performed by: STUDENT IN AN ORGANIZED HEALTH CARE EDUCATION/TRAINING PROGRAM

## 2024-10-27 PROCEDURE — 99222 1ST HOSP IP/OBS MODERATE 55: CPT | Performed by: NURSE PRACTITIONER

## 2024-10-27 PROCEDURE — 101N000002 HC CUSTODIAL CARE DAILY

## 2024-10-27 PROCEDURE — 85027 COMPLETE CBC AUTOMATED: CPT | Performed by: PHYSICIAN ASSISTANT

## 2024-10-27 RX ORDER — WARFARIN SODIUM 7.5 MG/1
7.5 TABLET ORAL
Status: COMPLETED | OUTPATIENT
Start: 2024-10-27 | End: 2024-10-27

## 2024-10-27 RX ORDER — TRAZODONE HYDROCHLORIDE 50 MG/1
50 TABLET, FILM COATED ORAL
Status: DISCONTINUED | OUTPATIENT
Start: 2024-10-27 | End: 2024-10-30 | Stop reason: HOSPADM

## 2024-10-27 RX ORDER — POTASSIUM CHLORIDE 750 MG/1
20 TABLET, EXTENDED RELEASE ORAL DAILY
Status: DISCONTINUED | OUTPATIENT
Start: 2024-10-28 | End: 2024-10-30 | Stop reason: HOSPADM

## 2024-10-27 RX ORDER — TORSEMIDE 20 MG/1
40 TABLET ORAL DAILY
Status: DISCONTINUED | OUTPATIENT
Start: 2024-10-28 | End: 2024-10-30 | Stop reason: HOSPADM

## 2024-10-27 RX ORDER — HYDROXYZINE HYDROCHLORIDE 25 MG/1
25 TABLET, FILM COATED ORAL EVERY 6 HOURS PRN
Status: DISCONTINUED | OUTPATIENT
Start: 2024-10-27 | End: 2024-10-30 | Stop reason: HOSPADM

## 2024-10-27 RX ADMIN — LEVOTHYROXINE SODIUM 175 MCG: 175 TABLET ORAL at 08:09

## 2024-10-27 RX ADMIN — SENNOSIDES 1 TABLET: 8.6 TABLET, FILM COATED ORAL at 20:04

## 2024-10-27 RX ADMIN — SENNOSIDES 1 TABLET: 8.6 TABLET, FILM COATED ORAL at 08:09

## 2024-10-27 RX ADMIN — ASPIRIN 81 MG CHEWABLE TABLET 81 MG: 81 TABLET CHEWABLE at 08:09

## 2024-10-27 RX ADMIN — ATORVASTATIN CALCIUM 40 MG: 40 TABLET, FILM COATED ORAL at 08:08

## 2024-10-27 RX ADMIN — SODIUM PHOSPHATE 1 ENEMA: 7; 19 ENEMA RECTAL at 12:33

## 2024-10-27 RX ADMIN — CHLORHEXIDINE GLUCONATE 0.12% ORAL RINSE 15 ML: 1.2 LIQUID ORAL at 20:05

## 2024-10-27 RX ADMIN — Medication 25 MCG: at 08:10

## 2024-10-27 RX ADMIN — WARFARIN SODIUM 7.5 MG: 7.5 TABLET ORAL at 18:04

## 2024-10-27 RX ADMIN — Medication 1 TABLET: at 08:09

## 2024-10-27 RX ADMIN — OXYCODONE HYDROCHLORIDE AND ACETAMINOPHEN 1000 MG: 500 TABLET ORAL at 08:09

## 2024-10-27 RX ADMIN — TAMSULOSIN HYDROCHLORIDE 0.4 MG: 0.4 CAPSULE ORAL at 08:09

## 2024-10-27 RX ADMIN — METOPROLOL SUCCINATE 100 MG: 100 TABLET, EXTENDED RELEASE ORAL at 08:10

## 2024-10-27 NOTE — PLAN OF CARE
Goal Outcome Evaluation:      Plan of Care Reviewed With: patient          Outcome Evaluation: Pt will discharge to rehabilitation facility as per PT/OT recs.

## 2024-10-27 NOTE — UTILIZATION REVIEW
Concurrent stay review; Secondary Review Determination - [facility]        Under the authority of the Utilization Management Committee, the utilization review process indicated a secondary review on the above patient.  The review outcome is based on review of the medical records, discussions with staff, and applying clinical experience noted on the date of the review.        (x) Outpatient FDC status is appropriate       RATIONALE FOR DETERMINATION:       74 year old male with history of DMII, CKD, HFpEF, A fib, depression, THONG, chronic anemia, hypothyroidism, HLD, and BPH who was admitted to [facility] 10/25/2024 with mechanical fall, MICHELLE, and inability to care for himself. PTA fell walking to the kitchen due to knees buckling. No LOC, did not hit head.  Of note, recently admitted to [facility] 9/22 to 10/3 with similar presentation and was found to have citrobacter bacteremia. Recently discharged from TCU but struggled to care for self, perform ADLs, and limited PO intake since discharge.     His IVF were weaned off the day after the admission date and he is undergoing therapy. He is declining to stay in a TCU. Psych was consulted and started him on hydroxyzine and trazodone. Appears to be just awaiting a safe disposition plan.     Patient delayed discharge is related to disposition, there is no medical necessity for inpatient admission at the time of this review. If there is a change in patient status, please resend for review.    The information on this document is developed by the utilization review team in order for the business office to ensure compliance.  This only denotes the appropriateness of proper admission status and does not reflect the quality of care rendered.       The definitions of Inpatient Status and Observation Status used in making the determination above are those provided in the CMS Coverage Manual, Chapter 1 and Chapter 6, section 70.4.       Sincerely,    Roberto HARVEY  Horace, DO

## 2024-10-27 NOTE — PROGRESS NOTES
"[facility]    Medicine Progress Note - Hospitalist Service, GOLD TEAM 16    Date of Admission:  10/25/2024    Assessment & Plan     Clarke Moreira is a 74 year old male with history of DMII, CKD, HFpEF, A fib, depression, THONG, chronic anemia, hypothyroidism, HLD, and BPH who was admitted to [facility] 10/25/2024 with mechanical fall, MICHELLE, and inability to care for himself. PTA fell walking to the kitchen due to knees buckling. No LOC, did not hit head.  Of note, recently admitted to [facility] 9/22 to 10/3 with similar presentation and was found to have citrobacter bacteremia. Recently discharged from TCU but struggled to care for self, perform ADLs, and limited PO intake since discharge.        10/27  - now tells me that he was sent to a bad TCU form sones and that he does not trust the process , to me he states he wants  to go home and not TCU.I spoke  with case management  and he said the opposite 10/.26      Depression:  - 10/26 told me he has very bad depression and anxiety , told physical therapy  \" \"be able to go home and sell belongs and prepare to die\"   - denies suicidal thoughts but states \" its too early\"  - tells me he has been living with these thoughts for over 20 years  - tells me he had seen psychiatrist before and he tells  me he was told that he Is better off not telling anyone about these thoughts  - tells me he has tried medications before that did not work  - he got upset that I told him that I asked psychiatrist to see him   and said \" you have to cover your a...\"       Mechanical fall  Lower back pain    Bilateral knee  hip pain   -Head CT and cervical/thoracic/lumbar spine CT no acute findings.   -XR R knee and pelvis no acute findings. I told patient  they did right sided XR as when he came in he complained of pain on right side , he told me \" well that is what you been told but that is not what I said\"   - complains of  left hip pain post fall,  XR "  shows no fracture , discussed with  patient    - UA negative for signs of infection   - BCx x1 given recent bacteremia  - PT, OT, nutrition consults   - Pain management with Tylenol and Lidoderm patches     Hypotension:   -BP 80s-110s/50s-70s in the ED, responded to IV fluids    - Encourage oral intake  - Infectious workup   - blood pressure  now good  - monitor with restarting heart failure  medications       Failure to thrive  - has not had good appetite, not able to cook an dcare for himself  - nutritionist to see  - physical therapy  Occupational therapy    -      Hypertension    -blood pressure  improved  so metoprolol  resumed with holding parameters      HFpEF:   -Last echo 3/2021 technically difficult, EF 50-55%, global RV function mildly reduced with mild RV dilation. PTA on Metoprolol, Spironolactone, Torsemide 80 mg daily, KCl.   - restart torsemide at 40mg for now and adjust as needed and montior renal functions   - Hold spironolactone for now,   - Echo  10/25:  EF 55-60%       Paroxysmal A fib  Long term anticoagulation   -CHADSVASC 5. PTA on Coumadin and Metoprolol.   - Coumadin per pharmacy  - Continue metoprolol with hold parameters     Elevated troponin  - trop flat  - no symptoms   - EKG atrial fibrillation  no acute st t wave changes     Chronic hypercapnic respiratory failure   THONG:   -Continue CPAP   - no current issues     AGMA:  - resolved      MICHELLE on CKD III:   -baseline Cr around 1. Elevated to 1.77 10/25, BUN 23.1. Likely prerenal in the setting, on diuretics  of poor oral intake.   - received IV fluids  and now creatinine close to baseline   - slowly restarting diuretics , monitor renal functions   -  UA/UC  - CK 75   - avoid nephrotoxic agents     Constipation  - states has not had BM since admitted to TCU, did have some watery stool Thursday suspect due to overflow diarrhea   - abdominal XR  does show moderate  to large  colonic stool burden , needs aggressive bowel  "regime, enemas , laxatives  , discussed with  patient         T2DM with neuropathy :   - ling standing left lateral thigh numbness  -A1c 6.2 10/7. PTA on Metformin only.   - Hold Metformin but restart once taking in oral reliably   - MSSI, hypoglycemia protocol         HLD:  - Continue PTA ASA and statin    Depression, anxiety :   No PTA meds.  -feels very depressed, also feels anxious   - will ask psych to see on Monday     Chronic microcytic anemia:   - baseline  hgb 10.5-12 and stable.   - Iron low to 14 9/22/2024.   -PTA on iron, continue, was started on last admssion  - out patient  GI work up      Hypothyroidism:   TSH 0.93 10/7/24. Continue synthroid       Osteopenia on XR  - defer back to PMD   - vit d levels were nl last admisiosn      BPH:   Continue Flomax    Chronic wounds:   Per notes, 2/2 DM. WONC consulted    Peridontal disease  -  during last admission was seen by dentistry,  on evaluation \"possible root tip maxillary left second molar (#15). Dentistry recs:- start chlorhexidine oral rinse BID for 4-8 weeks  - follow up with hospital dental clinic in [facility] for further evaluation.\"     MDD  CHRIS  - not on medications PTA, see above       Diet:  Regular  DVT Prophylaxis: Coumadin per pharmacy, mechanical   Bazzi Catheter: Not present  Lines: None     Cardiac Monitoring: None  Code Status:  DNR/DNI, pressors ok    Clinically Significant Risk Factors Present on Admission           # Hypochloremia: Lowest Cl = 94 mmol/L in last 2 days, will monitor as appropriate     # Anion Gap Metabolic Acidosis: Highest Anion Gap = 20 mmol/L in last 2 days, will monitor and treat as appropriate   # Drug Induced Coagulation Defect: home medication list includes an anticoagulant medication  # Drug Induced Platelet Defect: home medication list includes an antiplatelet medication  # Acute Kidney Injury, unspecified: based on a >150% or 0.3 mg/dL increase in last creatinine compared to past 90 day average, will monitor " renal function  # Hypertension: Noted on problem list     # Severe Obesity: Estimated body mass index is 48.82 kg/m  as calculated from the following:    Height as of this encounter: 1.829 m (6').    Weight as of this encounter: 163.3 kg (360 lb).       # Financial/Environmental Concerns:         Disposition Plan      Medically Ready for Discharge: Anticipated in 2-4 Days        Diet: Combination Diet Regular Diet Adult    DVT Prophylaxis: Warfarin  Bazzi Catheter: Not present  Lines: None     Cardiac Monitoring: None  Code Status: No CPR- Do NOT Intubate      Clinically Significant Risk Factors Present on Admission        # Hypokalemia: Lowest K = 3 mmol/L in last 2 days, will replace as needed   # Hypochloremia: Lowest Cl = 96 mmol/L in last 2 days, will monitor as appropriate         # Drug Induced Coagulation Defect: home medication list includes an anticoagulant medication  # Drug Induced Platelet Defect: home medication list includes an antiplatelet medication   # Hypertension: Noted on problem list  # Chronic heart failure with preserved ejection fraction: heart failure noted on problem list and last echo with EF >50%        # Severe Obesity: Estimated body mass index is 46.55 kg/m  as calculated from the following:    Height as of this encounter: 1.829 m (6').    Weight as of this encounter: 155.7 kg (343 lb 4.1 oz).         # Financial/Environmental Concerns: insurance inadequate         Disposition Plan     Medically Ready for Discharge: Anticipated in 2-4 Days           Lucila Sorensen MD  Hospitalist Service, GOLD TEAM 16  [facility]  Securely message with [email or web address] (more info)  Text page via [facility] Paging/Directory   See signed in provider for up to date coverage information  ______________________________________________________________________    Interval History   States did not sleep well as was bothered for blood draws, not  happy when I asked about his  depression and when I said that I have asked psychiatrist to see him has not eaten yet , will do the enemas today      Physical Exam   Vital Signs: Temp: 97.8  F (36.6  C) Temp src: Oral BP: 122/66 Pulse: 93   Resp: 17 SpO2: 95 % O2 Device: None (Room air)    Weight: 343 lbs 4.1 oz  General appearence: awake alert  in  no apparent distress    RESPIRATORY: lungs clear    CARDIOVASCULAR:S1 S2 irreg   GASTROINTESTINAL:soft, non-distended , non-tender , + bowel sounds,    SKIN: warm and dry, no mottling noted , has scrapes over left knee   NEUROLOGIC; awake alert and oriented,   EXTREMITIES: no clubbing, cyanosis, +  edema         Data     I have personally reviewed the following data over the past 24 hrs:    8.9  \   11.1 (L)   / 278     136 96 (L) 16.3 /  115 (H)   3.5 27 1.17 \     INR:  2.52 (H) PTT:  N/A   D-dimer:  N/A Fibrinogen:  N/A       Imaging results reviewed over the past 24 hrs:   Recent Results (from the past 24 hours)   XR Hip Left 2-3 Views    Narrative    EXAM: XR HIP LEFT 2-3 VIEWS  LOCATION: [facility]  DATE: 10/26/2024    INDICATION: Status post fall, left hip pain.  COMPARISON: None.      Impression    IMPRESSION: Moderate osteoarthrosis of the right hip. The left hip appears normal. There is no evidence of fracture or osteonecrosis.   XR Abdomen 1 View    Narrative    EXAMINATION:  XR ABDOMEN 1 VIEW 10/26/2024 3:06 PM.    COMPARISON: CT 9/22/2024.    HISTORY:  Check stool burden    FINDINGS:   Moderate to large colonic stool burden. Nonobstructive bowel gas  pattern. No pneumatosis or portal venous gas. No acute osseous  abnormality.      Impression    IMPRESSION:   Moderate to large colonic stool burden. Nonobstructive bowel gas  pattern.    I have personally reviewed the examination and initial interpretation  and I agree with the findings.    ARLIN TYLER DO         SYSTEM ID:  [id number]

## 2024-10-27 NOTE — PROGRESS NOTES
Shift [id number]:Pt had a when he was getting out of bed recently discharged to home from a  TCU  Pt has a history of T2DM with neuropathy and chronic ulcers,and CKD Pt has no ability to take care of himself at home  Summary:Discharge to a TCU  VS:       Pt A/O X 4. Afebrile. VSS. Lungs- Clear bilaterally with both       anterior and posterior. IS encouraged. Denies nausea, shortness of breath, and chest pain.     Output:       Bowels- + in all four quadrants.Pt refused his enema evening needs to have it today Voids spontaneously without   difficulty in the urinal .      Activity:       Pt up sitting in bed Pt refused all turns .  Assist of 2 for mobility   Skin:   Bruises and redness on BLE .  Bilateral knee bruises and scabbing   Pain:       Denies any pain.      CMS:       CMS and Neuro's are intact. Denies numbness and tingling in all extremities.      Dressing:     None.      Diet:       Pt is on a Regular diet       LDA:       PIV is patent in the Right Arm .      Equipment:      PCD's on BLE's. Bilateral heels are elevated off the bed.     Plan:       Pt is able to make needs known and the call light is within the pt's reach. Continue to monitor.       Additional Info:        .
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
## 2024-10-27 NOTE — PLAN OF CARE
Goal Outcome Evaluation:    Plan of Care Reviewed With: patient  Overall Patient Progress: no change  Outcome Evaluation: Pt received enema today and had large BM. Pt refused pain medication during shift. Pt stated he is motivated to get better and get back to his baseline.    Assessment: Pt A&Ox4 and able to make needs known. Pt is assist of 2. Pt denies SOB, CP, N/V, dizziness. CMS intact but states numbness & tingling in BLE (baseline for pt). Pt is continent of bowel and bladder; pt uses BC and urinal. Pt is on regular diet and is tolerating well. Pt pain is 0/10. Pt has scabs on bilateral knees and has EdemaWear in place per WOC order. Pt has R PIV and is patent. Pt call light within reach.     Major Shift Changes: Pt had enema and large BM after; Pt tolerated well. Pt had psych consult today based on statements made on previous day.     Plan:   - Continue POC

## 2024-10-27 NOTE — PROGRESS NOTES
A/Ox's 4. Pt denied pain Dressing CDI. CMS to baseline. Tolerated regular diet. BG checks done. Denied any nausea, CP, SOB, lightheadedness or dizziness. Voiding without pain or difficulty. Passing flatus. Pt denied abd discomfort and did not want the enema tonight. Pt said he will take it in the morning. Assist of 2-3 for cares. Resting in bed at this time with call light in reach. Able to make needs known.

## 2024-10-28 ENCOUNTER — APPOINTMENT (OUTPATIENT)
Dept: PHYSICAL THERAPY | Facility: CLINIC | Age: 74
End: 2024-10-28
Payer: COMMERCIAL

## 2024-10-28 LAB
ANION GAP SERPL CALCULATED.3IONS-SCNC: 10 MMOL/L (ref 7–15)
BUN SERPL-MCNC: 10.7 MG/DL (ref 8–23)
CALCIUM SERPL-MCNC: 9.5 MG/DL (ref 8.8–10.4)
CHLORIDE SERPL-SCNC: 101 MMOL/L (ref 98–107)
CREAT SERPL-MCNC: 0.9 MG/DL (ref 0.67–1.17)
EGFRCR SERPLBLD CKD-EPI 2021: 90 ML/MIN/1.73M2
ERYTHROCYTE [DISTWIDTH] IN BLOOD BY AUTOMATED COUNT: 17.2 % (ref 10–15)
GLUCOSE BLDC GLUCOMTR-MCNC: 114 MG/DL (ref 70–99)
GLUCOSE BLDC GLUCOMTR-MCNC: 134 MG/DL (ref 70–99)
GLUCOSE BLDC GLUCOMTR-MCNC: 153 MG/DL (ref 70–99)
GLUCOSE SERPL-MCNC: 125 MG/DL (ref 70–99)
HCO3 SERPL-SCNC: 25 MMOL/L (ref 22–29)
HCT VFR BLD AUTO: 37.9 % (ref 40–53)
HGB BLD-MCNC: 11.5 G/DL (ref 13.3–17.7)
INR PPP: 2.06 (ref 0.85–1.15)
MCH RBC QN AUTO: 23.6 PG (ref 26.5–33)
MCHC RBC AUTO-ENTMCNC: 30.3 G/DL (ref 31.5–36.5)
MCV RBC AUTO: 78 FL (ref 78–100)
PLATELET # BLD AUTO: 279 10E3/UL (ref 150–450)
POTASSIUM SERPL-SCNC: 3.8 MMOL/L (ref 3.4–5.3)
RBC # BLD AUTO: 4.87 10E6/UL (ref 4.4–5.9)
SODIUM SERPL-SCNC: 136 MMOL/L (ref 135–145)
WBC # BLD AUTO: 8.3 10E3/UL (ref 4–11)

## 2024-10-28 PROCEDURE — G0378 HOSPITAL OBSERVATION PER HR: HCPCS

## 2024-10-28 PROCEDURE — 99232 SBSQ HOSP IP/OBS MODERATE 35: CPT | Performed by: INTERNAL MEDICINE

## 2024-10-28 PROCEDURE — 250N000013 HC RX MED GY IP 250 OP 250 PS 637: Performed by: STUDENT IN AN ORGANIZED HEALTH CARE EDUCATION/TRAINING PROGRAM

## 2024-10-28 PROCEDURE — 80048 BASIC METABOLIC PNL TOTAL CA: CPT | Performed by: PHYSICIAN ASSISTANT

## 2024-10-28 PROCEDURE — 250N000013 HC RX MED GY IP 250 OP 250 PS 637: Performed by: PHYSICIAN ASSISTANT

## 2024-10-28 PROCEDURE — 250N000013 HC RX MED GY IP 250 OP 250 PS 637

## 2024-10-28 PROCEDURE — 82962 GLUCOSE BLOOD TEST: CPT

## 2024-10-28 PROCEDURE — 36415 COLL VENOUS BLD VENIPUNCTURE: CPT | Performed by: PHYSICIAN ASSISTANT

## 2024-10-28 PROCEDURE — 250N000013 HC RX MED GY IP 250 OP 250 PS 637: Performed by: INTERNAL MEDICINE

## 2024-10-28 PROCEDURE — 120N000002 HC R&B MED SURG/OB UMMC

## 2024-10-28 PROCEDURE — 85027 COMPLETE CBC AUTOMATED: CPT | Performed by: PHYSICIAN ASSISTANT

## 2024-10-28 PROCEDURE — 97530 THERAPEUTIC ACTIVITIES: CPT | Mod: GP | Performed by: REHABILITATION PRACTITIONER

## 2024-10-28 PROCEDURE — 85610 PROTHROMBIN TIME: CPT | Performed by: STUDENT IN AN ORGANIZED HEALTH CARE EDUCATION/TRAINING PROGRAM

## 2024-10-28 RX ORDER — WARFARIN SODIUM 10 MG/1
10 TABLET ORAL
Status: COMPLETED | OUTPATIENT
Start: 2024-10-28 | End: 2024-10-28

## 2024-10-28 RX ORDER — SODIUM PHOSPHATE,MONO-DIBASIC 19G-7G/118
1 ENEMA (ML) RECTAL ONCE
Status: COMPLETED | OUTPATIENT
Start: 2024-10-28 | End: 2024-10-28

## 2024-10-28 RX ADMIN — LEVOTHYROXINE SODIUM 175 MCG: 175 TABLET ORAL at 06:59

## 2024-10-28 RX ADMIN — CHLORHEXIDINE GLUCONATE 0.12% ORAL RINSE 15 ML: 1.2 LIQUID ORAL at 20:10

## 2024-10-28 RX ADMIN — WARFARIN SODIUM 10 MG: 10 TABLET ORAL at 18:57

## 2024-10-28 RX ADMIN — OXYCODONE HYDROCHLORIDE AND ACETAMINOPHEN 1000 MG: 500 TABLET ORAL at 08:33

## 2024-10-28 RX ADMIN — Medication 25 MCG: at 08:34

## 2024-10-28 RX ADMIN — Medication 1 TABLET: at 08:34

## 2024-10-28 RX ADMIN — SODIUM PHOSPHATE 1 ENEMA: 7; 19 ENEMA RECTAL at 17:45

## 2024-10-28 RX ADMIN — METOPROLOL SUCCINATE 100 MG: 100 TABLET, EXTENDED RELEASE ORAL at 08:34

## 2024-10-28 RX ADMIN — CHLORHEXIDINE GLUCONATE 0.12% ORAL RINSE 15 ML: 1.2 LIQUID ORAL at 08:33

## 2024-10-28 RX ADMIN — SENNOSIDES 1 TABLET: 8.6 TABLET, FILM COATED ORAL at 08:34

## 2024-10-28 RX ADMIN — METFORMIN HYDROCHLORIDE 500 MG: 500 TABLET, FILM COATED ORAL at 18:57

## 2024-10-28 RX ADMIN — ASPIRIN 81 MG CHEWABLE TABLET 81 MG: 81 TABLET CHEWABLE at 08:33

## 2024-10-28 RX ADMIN — SENNOSIDES 1 TABLET: 8.6 TABLET, FILM COATED ORAL at 20:10

## 2024-10-28 RX ADMIN — TAMSULOSIN HYDROCHLORIDE 0.4 MG: 0.4 CAPSULE ORAL at 08:33

## 2024-10-28 RX ADMIN — ATORVASTATIN CALCIUM 40 MG: 40 TABLET, FILM COATED ORAL at 08:33

## 2024-10-28 NOTE — PROGRESS NOTES
10/28/24  Care Management Follow Up    PAS was filed by CORY, [id number]. Before this person admits to a nursing facility, an OBRA Level II assessment for mental illness is required. This was notified to the SW on the unit.    ACCEPTED:    [address] [address]  PH: [phone]    [facility]  [facility]  [facility] 5 Ortho, 8 Med Surge, & ED  PH: [phone]

## 2024-10-28 NOTE — CONSULTS
Care Management Medical [facility] Outpatient Consult    Care management consulted by provider for care home assessment. CM spoke with provider to discuss care home case. Provider has confirmed patient is medically stable for discharge.    Background information: TCU recs/pt doesn't require hospital level of care    Care team recommended discharge disposition:  TCU    Resources needed for discharge: Accepting TCU    Destination    Service Provider Request Status Services Address Phone Fax Patient Preferred   [facility] TRANSITIONAL CARE Pending - Request Sent -- [address] [facility] [phone] --   [facility] F[facility] REFERRAL (REF'L) Pending - Request Sent -- -- [phone] --   [facility] Pending - Request Sent -- [address] [facility] [id number] [phone] --   Current Capacity last updated by Desi Delgado on 4/11/2024 12:24 PM    No on-site Hemodialysis., Unable to take MA Pending. We no longer have on site dialysis. No weekend coverage.            [facility] Pending - Request Sent -- [address] [place] [phone] --     Discharge plan secured to meet patient's needs?   Not yet    Estimated timeline for discharge:   less than 24 hours    Barriers to discharge: Need an accepting facility    Next steps:       Anticipate patient will discharge within the next 24 hours to TCU for PT/OT.    MERVAT sent referrals to [facility] as pt expressed feeling bad about being sent to a TCU last time that he had no experience with and that it was not a good place. He trusts the [facility] network more.    Anticipate patient will transition to care home care due to not requiring hospital level of care. MERVAT met/spoke with patient at bedside. Discussed with patient that he no longer meets medical criteria for ongoing hospital level of care  and medical insurance may not cover the ongoing hospital stay.     SW read message from [facility] that they need pt's social security number to ask for insurance auth. SW left  for [facility] (Alan) with that information.    MERVAT heard from Pati at  TCU that pt is declined from  TCU as he might be more appropriate for long term care.     MARC Alvarenga  [facility]   Covering for [place] - Phone: [phone]

## 2024-10-28 NOTE — PLAN OF CARE
[id number]    Goal Outcome Evaluation:      Plan of Care Reviewed With: patient    Overall Patient Progress: no change    Patient alert and oriented, stable on room air. Up assist x2-3 with marisa steady to bedside commode. Not out of bed this shift, refused repositioning. Regular diet, thin liquids, pills whole, denies nausea/vomiting. BG checks. Right PIV, saline locked. Baseline numbness/tingling in BLE. Continent of bowel and bladder, no BM this shift, using urinal. Denies pain. Scabs on bilateral knees, EdemaWear in place per WOC order. RN managed potassium. Able to make needs known. Call light within reach. Continue with current plan of care.

## 2024-10-28 NOTE — PLAN OF CARE
Goal Outcome Evaluation:      Plan of Care Reviewed With: patient    Overall Patient Progress: improvingOverall Patient Progress: improving    Outcome Evaluation: Enema today with small result. Refusing repositioning. Assist of 2-3 with marisa bryan to transfer. Worked with therapy today and did edema wraps today. No complaints of pain this shift.
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
## 2024-10-29 PROBLEM — R29.6 FREQUENT FALLS: Status: ACTIVE | Noted: 2024-10-29

## 2024-10-29 LAB
GLUCOSE BLDC GLUCOMTR-MCNC: 100 MG/DL (ref 70–99)
GLUCOSE BLDC GLUCOMTR-MCNC: 115 MG/DL (ref 70–99)
GLUCOSE BLDC GLUCOMTR-MCNC: 146 MG/DL (ref 70–99)
HOLD SPECIMEN: NORMAL
HOLD SPECIMEN: NORMAL
INR PPP: 1.73 (ref 0.85–1.15)

## 2024-10-29 PROCEDURE — G0378 HOSPITAL OBSERVATION PER HR: HCPCS

## 2024-10-29 PROCEDURE — 250N000013 HC RX MED GY IP 250 OP 250 PS 637: Performed by: INTERNAL MEDICINE

## 2024-10-29 PROCEDURE — 82962 GLUCOSE BLOOD TEST: CPT

## 2024-10-29 PROCEDURE — 250N000013 HC RX MED GY IP 250 OP 250 PS 637: Performed by: PHYSICIAN ASSISTANT

## 2024-10-29 PROCEDURE — 85610 PROTHROMBIN TIME: CPT | Performed by: STUDENT IN AN ORGANIZED HEALTH CARE EDUCATION/TRAINING PROGRAM

## 2024-10-29 PROCEDURE — 99232 SBSQ HOSP IP/OBS MODERATE 35: CPT | Performed by: STUDENT IN AN ORGANIZED HEALTH CARE EDUCATION/TRAINING PROGRAM

## 2024-10-29 PROCEDURE — 250N000013 HC RX MED GY IP 250 OP 250 PS 637

## 2024-10-29 PROCEDURE — 250N000013 HC RX MED GY IP 250 OP 250 PS 637: Performed by: STUDENT IN AN ORGANIZED HEALTH CARE EDUCATION/TRAINING PROGRAM

## 2024-10-29 PROCEDURE — 36415 COLL VENOUS BLD VENIPUNCTURE: CPT | Performed by: STUDENT IN AN ORGANIZED HEALTH CARE EDUCATION/TRAINING PROGRAM

## 2024-10-29 RX ORDER — WARFARIN SODIUM 10 MG/1
10 TABLET ORAL
Status: COMPLETED | OUTPATIENT
Start: 2024-10-29 | End: 2024-10-29

## 2024-10-29 RX ADMIN — METOPROLOL SUCCINATE 100 MG: 100 TABLET, EXTENDED RELEASE ORAL at 09:39

## 2024-10-29 RX ADMIN — POTASSIUM CHLORIDE 20 MEQ: 750 TABLET, EXTENDED RELEASE ORAL at 09:37

## 2024-10-29 RX ADMIN — TAMSULOSIN HYDROCHLORIDE 0.4 MG: 0.4 CAPSULE ORAL at 09:37

## 2024-10-29 RX ADMIN — SENNOSIDES 1 TABLET: 8.6 TABLET, FILM COATED ORAL at 09:42

## 2024-10-29 RX ADMIN — METFORMIN HYDROCHLORIDE 500 MG: 500 TABLET, FILM COATED ORAL at 09:39

## 2024-10-29 RX ADMIN — SENNOSIDES 1 TABLET: 8.6 TABLET, FILM COATED ORAL at 20:07

## 2024-10-29 RX ADMIN — TORSEMIDE 40 MG: 20 TABLET ORAL at 13:38

## 2024-10-29 RX ADMIN — CHLORHEXIDINE GLUCONATE 0.12% ORAL RINSE 15 ML: 1.2 LIQUID ORAL at 09:37

## 2024-10-29 RX ADMIN — ASPIRIN 81 MG CHEWABLE TABLET 81 MG: 81 TABLET CHEWABLE at 09:38

## 2024-10-29 RX ADMIN — ATORVASTATIN CALCIUM 40 MG: 40 TABLET, FILM COATED ORAL at 09:42

## 2024-10-29 RX ADMIN — METFORMIN HYDROCHLORIDE 500 MG: 500 TABLET, FILM COATED ORAL at 17:59

## 2024-10-29 RX ADMIN — OXYCODONE HYDROCHLORIDE AND ACETAMINOPHEN 1000 MG: 500 TABLET ORAL at 09:42

## 2024-10-29 RX ADMIN — CHLORHEXIDINE GLUCONATE 0.12% ORAL RINSE 15 ML: 1.2 LIQUID ORAL at 20:07

## 2024-10-29 RX ADMIN — FERROUS SULFATE TAB 325 MG (65 MG ELEMENTAL FE) 325 MG: 325 (65 FE) TAB at 13:38

## 2024-10-29 RX ADMIN — Medication 25 MCG: at 09:42

## 2024-10-29 RX ADMIN — WARFARIN SODIUM 10 MG: 10 TABLET ORAL at 17:59

## 2024-10-29 RX ADMIN — Medication 1 TABLET: at 09:38

## 2024-10-29 RX ADMIN — LEVOTHYROXINE SODIUM 175 MCG: 175 TABLET ORAL at 09:42

## 2024-10-29 NOTE — PROGRESS NOTES
Care Management Follow Up    Length of Stay (days): 2    Expected Discharge Date: 10/31/2024     Concerns to be Addressed: discharge planning, financial/insurance, home safety     Patient plan of care discussed at interdisciplinary rounds: Yes    Anticipated Discharge Disposition: Acute Rehab, Transitional Care              Anticipated Discharge Services: Housekeeping/[employer], Transportation Services, Volunteer Services  Anticipated Discharge DME: Walker    Patient/family educated on Medicare website which has current facility and service quality ratings:    Education Provided on the Discharge Plan: Yes  Patient/Family in Agreement with the Plan: yes    Referrals Placed by CM/SW:    Private pay costs discussed: Not applicable    Discussed  Partnership in Safe Discharge Planning  document with patient/family: No     Handoff Completed: No, handoff not indicated or clinically appropriate    Additional Information:  Writer spoke with [place] screening. Writer was informed that this pt had a referral sent to [facility] assessment this morning at 8:08 AM. Writer spoke with a [facility] PAS screen  about a different pt. The  shared that this pt will have a different worker assigned to them and that the  will reach out to social work within a few business days. Writer spoke with [facility] and provided an update on the status of the OBRA level 2 screen and updated PT/OT notes. Admissions would like an update on the status of the OBRA level 2 when SW knows more.     Next Steps: Coordinate discharge planning barrier to discharge is the OBRA level 2 screening being complete.       YAAKOV Garcia   [facility]   Ph: [phone]  [facility]  Care Management Department    Securely message me on Vocera

## 2024-10-29 NOTE — PLAN OF CARE
Goal Outcome Evaluation:      Plan of Care Reviewed With: patient    Overall Patient Progress: improving    O2: On RA   Output: Continent of B/B. Uses urinal.bedside commode   Last BM: 10/29/24, had a BM this shift   Activity: Assist x2 with sera steady   Skin: X scab on BLE   Pain: Denies   CMS:  Neuro: Intact  A/O x4, able to communicate needs   Dressing: R PIV CDI   Diet: Regular   LDA: R PIV, saline locked   Equipment: Commode, sera steady   Plan: Con't POC.    Additional Info: Pt is calm and cooperative with cares, denies SOB, CP, N/V. Compression socks on for BLE edema.

## 2024-10-29 NOTE — PLAN OF CARE
Goal Outcome Evaluation:  Outcome Evaluation: Afebrile, skin care completed, no complaints of pain. Needs encouragment to reposition.    A&Ox4. Denies numbness/tingling, nausea,vomiting, SOB, and chest pain.  Numbness and tingling to BLE  Up w/sera steady A2, not OOB this shift, feet elevated, repositioned Q2. voiding adequate amounts via bedside urinal. LBM: 10/28  Lymphedema wraps in place. Denies pain.  Continue with POC.

## 2024-10-29 NOTE — UTILIZATION REVIEW
Concurrent stay review; [facility] Determination [facility]        Under the authority of the Utilization Management Committee, the utilization review process indicated a secondary review on the above patient.  The review outcome is based on review of the medical records, discussions with staff, and applying clinical experience noted on the date of the review.        (x) Outpatient assisted status is appropriate       RATIONALE FOR DETERMINATION:     Patient delayed discharge is related to disposition, there is no medical necessity for inpatient admission at the time of this review. If there is a change in patient status, please resend for review.    This is a note to explain the status changes. It was initially inpatient on 10/25, changed to OBS same day. On 10/27, UR review advised discharge and admit to UR senior living outpatient care. See note in EPIC. However, no discharge was completed, and no new admission was completed.   For unknown reasons, an inpatient order was place on 10/28 inappropriately.      Discussed with new attending on 10/29 and patient is clearly senior living care. Unclear how long until disposition. May be evaluated by the state??? Unclear issues. Advised to change status to OBS today at least. Will send to next UR to follow up and consider the outpatient protocol if needed.     The information on this document is developed by the utilization review team in order for the business office to ensure compliance.  This only denotes the appropriateness of proper admission status and does not reflect the quality of care rendered.       The definitions of Inpatient Status and Observation Status used in making the determination above are those provided in the CMS Coverage Manual, Chapter 1 and Chapter 6, section 70.4.       Sincerely,    Dilia Luo MD

## 2024-10-29 NOTE — PROGRESS NOTES
"[facility]    Medicine Progress Note - Hospitalist Service, GOLD TEAM 21    Date of Admission:  10/25/2024    Assessment & Plan   Clarke Moreira is a 74 year old male with history of DMII, CKD, HFpEF, A fib, depression, THONG, chronic anemia, hypothyroidism, HLD, and BPH who was admitted to [facility] 10/25/2024 with mechanical fall, MICHELLE, and inability to care for himself. PTA fell walking to the kitchen due to knees buckling. No LOC, did not hit head.  Of note, recently admitted to [facility] 9/22 to 10/3 with similar presentation and was found to have citrobacter bacteremia. Recently discharged from TCU but struggled to care for self, perform ADLs, and limited PO intake since discharge.      Changes today:  - discussed with UR - observation status today, if not able to discharge tomorrow - [facility] care  - lymphedema consult  - increase diuretics to home dosing tomorrow     Depression:  - seen by psych, no acute inpatient needs or precautions. No med changes     Mechanical fall  Lower back pain    Bilateral knee  hip pain   -Head CT and cervical/thoracic/lumbar spine CT no acute findings.   -XR R knee and pelvis no acute findings. I told patient  they did right sided XR as when he came in he complained of pain on right side , he told me \" well that is what you been told but that is not what I said\"   - complains of  left hip pain post fall,  XR  shows no fracture , discussed with  patient    - UA negative for signs of infection   - BCx x1 10/25, no growth to date   - PT, OT, nutrition consults   - Pain management with Tylenol and Lidoderm patches     Hypotension:   -BP 80s-110s/50s-70s in the ED, responded to IV fluids    - Encourage oral intake  - Infectious workup   - blood pressure  now good  - monitor with restarting heart failure  medications      Failure to thrive  - has not had good appetite, not able to cook an dcare for himself  - nutritionist to saw  - physical " therapy  Occupational therapy    -       Hypertension    -blood pressure  improved  so metoprolol  resumed with holding parameters      HFpEF:   -Last echo 3/2021 technically difficult, EF 50-55%, global RV function mildly reduced with mild RV dilation. PTA on Metoprolol back on it   -PTA  Spironolactone, Torsemide 80 mg daily, Kcl all on hold, monitor .   - staring to have some leg edema , so  restarting   torsemide at 40mg ( on 80 at home )  for now and adjust as needed and montior renal functions , also stared Kcl at 20, takes 40 bid at home   - Hold spironolactone for now,   - Echo  10/25:  EF 55-60%       Paroxysmal A fib  Long term anticoagulation   -CHADSVASC 5. PTA on Coumadin and Metoprolol.   - Coumadin per pharmacy  - Continue metoprolol with hold parameters      Elevated troponin  - trop flat  - no symptoms   - EKG atrial fibrillation  no acute st t wave changes      Chronic hypercapnic respiratory failure   THONG:   -Continue CPAP   - no current issues      AGMA:  - resolved      MICHELLE on CKD III:   -baseline Cr around 1. Elevated to 1.77 10/25, BUN 23.1. Likely prerenal in the setting, on diuretics  of poor oral intake.   - received IV fluids  and now creatinine close to baseline   - slowly restarting diuretics , monitor renal functions   -  UA negative for infection  - CK 75   - avoid nephrotoxic agents      Constipation  - states has not had BM since admitted to TCU, did have some watery stool Thursday suspect due to overflow diarrhea   - abdominal XR  does show moderate  to large  colonic stool burden , needs aggressive bowel regime, enemas , laxatives  , discussed with  patient       T2DM with neuropathy :   - ling standing left lateral thigh numbness  -A1c 6.2 10/7. PTA on Metformin only.   - restarted  Metformin  500 mg bid 10/28, accu checks bid     HLD:  - Continue PTA ASA and statin     Depression, anxiety :   No PTA meds.  -feels very depressed, also feels anxious   - will ask  "psych to see on Monday      Chronic microcytic anemia:   - baseline  hgb 10.5-12 and stable.   - Iron low to 14 9/22/2024.   -PTA on iron, continue, was started on last admssion  - out patient  GI work up       Hypothyroidism:   TSH 0.93 10/7/24. Continue synthroid      Osteopenia on XR  - defer back to PMD   - vit d levels were nl last admission     BPH:   Continue Flomax     Chronic wounds:   Per notes, 2/2 DM. [facility] consulted     Peridontal disease  -  during last admission was seen by dentistry,  on evaluation \"possible root tip maxillary left second molar (#15). Dentistry recs:- start chlorhexidine oral rinse BID for 4-8 weeks  - follow up with hospital dental clinic in [facility] for further evaluation.\"     MDD  CHRIS  - not on medications PTA, see above        Observation Goals: [place]ared by county  Diet: Combination Diet Regular Diet    DVT Prophylaxis: Low Risk/Ambulatory with no VTE prophylaxis indicated  Bazzi Catheter: Not present  Lines: None     Cardiac Monitoring: None  Code Status: No CPR- Do NOT Intubate      Clinically Significant Risk Factors Present on Admission                # Drug Induced Coagulation Defect: home medication list includes an anticoagulant medication  # Drug Induced Platelet Defect: home medication list includes an antiplatelet medication   # Hypertension: Noted on problem list  # Chronic heart failure with preserved ejection fraction: heart failure noted on problem list and last echo with EF >50%        # Severe Obesity: Estimated body mass index is 47.66 kg/m  as calculated from the following:    Height as of this encounter: 1.829 m (6').    Weight as of this encounter: 159.4 kg (351 lb 6.6 oz).       # Financial/Environmental Concerns: insurance inadequate         Disposition Plan     Medically Ready for Discharge: Anticipated Tomorrow             Damien Hudson MD  Hospitalist Service, GOLD TEAM 21  [facility]  Securely " message with [employer] (more info)  Text page via [facility] Paging/Directory   See signed in provider for up to date coverage information  ______________________________________________________________________    Interval History   Denies concerns this morning. Agreeable to plan.    Physical Exam   Vital Signs: Temp: 97.5  F (36.4  C) Temp src: Oral BP: 125/62 Pulse: 92   Resp: 18 SpO2: 98 % O2 Device: None (Room air)    Weight: 351 lbs 6.61 oz    GASTROINTESTINAL:soft, non-distended , non-tender , + bowel sounds,    NEUROLOGIC; awake alert and oriented,   EXTREMITIES: no clubbing, cyanosis, legs in stockings, some pedal edema    Medical Decision Making       35 MINUTES SPENT BY ME on the date of service doing chart review, history, exam, documentation & further activities per the note.      Data     I have personally reviewed the following data over the past 24 hrs:    INR:  1.73 (H) PTT:  N/A   D-dimer:  N/A Fibrinogen:  N/A       Imaging results reviewed over the past 24 hrs:   No results found for this or any previous visit (from the past 24 hours).

## 2024-10-30 ENCOUNTER — HOSPITAL ENCOUNTER (OUTPATIENT)
Facility: CLINIC | Age: 74
Discharge: NURSING, REHAB OR LONG-TERM CARE | End: 2024-11-02
Attending: STUDENT IN AN ORGANIZED HEALTH CARE EDUCATION/TRAINING PROGRAM | Admitting: STUDENT IN AN ORGANIZED HEALTH CARE EDUCATION/TRAINING PROGRAM
Payer: COMMERCIAL

## 2024-10-30 VITALS
DIASTOLIC BLOOD PRESSURE: 55 MMHG | RESPIRATION RATE: 18 BRPM | HEART RATE: 85 BPM | HEIGHT: 72 IN | TEMPERATURE: 98.4 F | OXYGEN SATURATION: 96 % | BODY MASS INDEX: 42.66 KG/M2 | SYSTOLIC BLOOD PRESSURE: 139 MMHG | WEIGHT: 315 LBS

## 2024-10-30 DIAGNOSIS — R55 PRE-SYNCOPE: ICD-10-CM

## 2024-10-30 DIAGNOSIS — G47.33 OSA (OBSTRUCTIVE SLEEP APNEA): ICD-10-CM

## 2024-10-30 DIAGNOSIS — E66.01 MORBID OBESITY (H): ICD-10-CM

## 2024-10-30 DIAGNOSIS — Z78.9 ADEQUATE NUTRITION: ICD-10-CM
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
## 2024-10-30 DIAGNOSIS — I48.0 PAROXYSMAL ATRIAL FIBRILLATION (H): ICD-10-CM

## 2024-10-30 DIAGNOSIS — I50.32 CHRONIC HEART FAILURE WITH PRESERVED EJECTION FRACTION (H): ICD-10-CM

## 2024-10-30 LAB
BACTERIA BLD CULT: NO GROWTH
GLUCOSE BLDC GLUCOMTR-MCNC: 109 MG/DL (ref 70–99)
GLUCOSE BLDC GLUCOMTR-MCNC: 143 MG/DL (ref 70–99)
GLUCOSE BLDC GLUCOMTR-MCNC: 96 MG/DL (ref 70–99)
INR PPP: 1.67 (ref 0.85–1.15)

## 2024-10-30 PROCEDURE — 250N000013 HC RX MED GY IP 250 OP 250 PS 637

## 2024-10-30 PROCEDURE — G0378 HOSPITAL OBSERVATION PER HR: HCPCS

## 2024-10-30 PROCEDURE — 250N000013 HC RX MED GY IP 250 OP 250 PS 637: Performed by: PHYSICIAN ASSISTANT

## 2024-10-30 PROCEDURE — 250N000013 HC RX MED GY IP 250 OP 250 PS 637: Performed by: STUDENT IN AN ORGANIZED HEALTH CARE EDUCATION/TRAINING PROGRAM

## 2024-10-30 PROCEDURE — 99239 HOSP IP/OBS DSCHRG MGMT >30: CPT | Performed by: STUDENT IN AN ORGANIZED HEALTH CARE EDUCATION/TRAINING PROGRAM

## 2024-10-30 PROCEDURE — 85610 PROTHROMBIN TIME: CPT | Performed by: STUDENT IN AN ORGANIZED HEALTH CARE EDUCATION/TRAINING PROGRAM

## 2024-10-30 PROCEDURE — 82962 GLUCOSE BLOOD TEST: CPT

## 2024-10-30 PROCEDURE — 36415 COLL VENOUS BLD VENIPUNCTURE: CPT | Performed by: STUDENT IN AN ORGANIZED HEALTH CARE EDUCATION/TRAINING PROGRAM

## 2024-10-30 PROCEDURE — 101N000002 HC CUSTODIAL CARE DAILY

## 2024-10-30 PROCEDURE — 999N000111 HC STATISTIC OT IP EVAL DEFER

## 2024-10-30 PROCEDURE — 99207 PR NO BILLABLE SERVICE THIS VISIT: CPT | Performed by: STUDENT IN AN ORGANIZED HEALTH CARE EDUCATION/TRAINING PROGRAM

## 2024-10-30 PROCEDURE — 250N000013 HC RX MED GY IP 250 OP 250 PS 637: Performed by: INTERNAL MEDICINE

## 2024-10-30 RX ORDER — VITAMIN B COMPLEX
25 TABLET ORAL DAILY
Status: CANCELLED | OUTPATIENT
Start: 2024-10-31

## 2024-10-30 RX ORDER — UREA 8.5 G/85G
CREAM TOPICAL 2 TIMES DAILY PRN
Status: CANCELLED | OUTPATIENT
Start: 2024-10-30

## 2024-10-30 RX ORDER — ATORVASTATIN CALCIUM 40 MG/1
40 TABLET, FILM COATED ORAL DAILY
Status: CANCELLED | OUTPATIENT
Start: 2024-10-31

## 2024-10-30 RX ORDER — LEVOTHYROXINE SODIUM 175 UG/1
175 TABLET ORAL
Status: DISCONTINUED | OUTPATIENT
Start: 2024-10-31 | End: 2024-11-02 | Stop reason: HOSPADM

## 2024-10-30 RX ORDER — SPIRONOLACTONE 25 MG/1
50 TABLET ORAL DAILY
Status: DISCONTINUED | OUTPATIENT
Start: 2024-10-31 | End: 2024-11-01

## 2024-10-30 RX ORDER — UREA 1 ML/10ML
LOTION TOPICAL 2 TIMES DAILY PRN
Status: DISCONTINUED | OUTPATIENT
Start: 2024-10-30 | End: 2024-11-02 | Stop reason: HOSPADM

## 2024-10-30 RX ORDER — AMOXICILLIN 250 MG
2 CAPSULE ORAL 2 TIMES DAILY PRN
Status: DISCONTINUED | OUTPATIENT
Start: 2024-10-30 | End: 2024-11-02 | Stop reason: HOSPADM

## 2024-10-30 RX ORDER — CHLORHEXIDINE GLUCONATE ORAL RINSE 1.2 MG/ML
15 SOLUTION DENTAL 2 TIMES DAILY
Status: CANCELLED | OUTPATIENT
Start: 2024-10-30

## 2024-10-30 RX ORDER — SALIVA STIMULANT COMB. NO.3
1 SPRAY, NON-AEROSOL (ML) MUCOUS MEMBRANE 4 TIMES DAILY
Status: CANCELLED | OUTPATIENT
Start: 2024-10-30

## 2024-10-30 RX ORDER — LIDOCAINE 4 G/G
2 PATCH TOPICAL
Status: CANCELLED | OUTPATIENT
Start: 2024-10-31

## 2024-10-30 RX ORDER — NICOTINE POLACRILEX 4 MG
15-30 LOZENGE BUCCAL
Status: CANCELLED | OUTPATIENT
Start: 2024-10-30

## 2024-10-30 RX ORDER — ASCORBIC ACID 500 MG
1000 TABLET ORAL DAILY
Status: CANCELLED | OUTPATIENT
Start: 2024-10-31

## 2024-10-30 RX ORDER — TAMSULOSIN HYDROCHLORIDE 0.4 MG/1
0.4 CAPSULE ORAL DAILY
Status: DISCONTINUED | OUTPATIENT
Start: 2024-10-31 | End: 2024-11-02 | Stop reason: HOSPADM

## 2024-10-30 RX ORDER — ONDANSETRON 2 MG/ML
4 INJECTION INTRAMUSCULAR; INTRAVENOUS EVERY 6 HOURS PRN
Status: DISCONTINUED | OUTPATIENT
Start: 2024-10-30 | End: 2024-11-02 | Stop reason: HOSPADM

## 2024-10-30 RX ORDER — METOPROLOL SUCCINATE 100 MG/1
100 TABLET, EXTENDED RELEASE ORAL DAILY
Status: CANCELLED | OUTPATIENT
Start: 2024-10-31

## 2024-10-30 RX ORDER — ATORVASTATIN CALCIUM 40 MG/1
40 TABLET, FILM COATED ORAL DAILY
Status: DISCONTINUED | OUTPATIENT
Start: 2024-10-31 | End: 2024-11-02 | Stop reason: HOSPADM

## 2024-10-30 RX ORDER — VITAMIN B COMPLEX
25 TABLET ORAL DAILY
Status: DISCONTINUED | OUTPATIENT
Start: 2024-10-31 | End: 2024-11-02 | Stop reason: HOSPADM

## 2024-10-30 RX ORDER — CHLORHEXIDINE GLUCONATE ORAL RINSE 1.2 MG/ML
15 SOLUTION DENTAL 2 TIMES DAILY
Status: DISCONTINUED | OUTPATIENT
Start: 2024-10-30 | End: 2024-11-02 | Stop reason: HOSPADM

## 2024-10-30 RX ORDER — SALIVA STIMULANT COMB. NO.3
1 SPRAY, NON-AEROSOL (ML) MUCOUS MEMBRANE 4 TIMES DAILY
Status: DISCONTINUED | OUTPATIENT
Start: 2024-10-30 | End: 2024-10-31

## 2024-10-30 RX ORDER — FERROUS SULFATE 325(65) MG
325 TABLET ORAL EVERY OTHER DAY
Status: DISCONTINUED | OUTPATIENT
Start: 2024-10-31 | End: 2024-11-02 | Stop reason: HOSPADM

## 2024-10-30 RX ORDER — ACETAMINOPHEN 325 MG/1
650 TABLET ORAL EVERY 4 HOURS PRN
Status: CANCELLED | OUTPATIENT
Start: 2024-10-30

## 2024-10-30 RX ORDER — LIDOCAINE 4 G/G
2 PATCH TOPICAL
Status: DISCONTINUED | OUTPATIENT
Start: 2024-10-31 | End: 2024-10-31

## 2024-10-30 RX ORDER — ASPIRIN 81 MG/1
81 TABLET, CHEWABLE ORAL DAILY
Status: CANCELLED | OUTPATIENT
Start: 2024-10-31

## 2024-10-30 RX ORDER — MULTIPLE VITAMINS W/ MINERALS TAB 9MG-400MCG
1 TAB ORAL DAILY
Status: DISCONTINUED | OUTPATIENT
Start: 2024-10-31 | End: 2024-11-02 | Stop reason: HOSPADM

## 2024-10-30 RX ORDER — ONDANSETRON 2 MG/ML
4 INJECTION INTRAMUSCULAR; INTRAVENOUS EVERY 6 HOURS PRN
Status: CANCELLED | OUTPATIENT
Start: 2024-10-30

## 2024-10-30 RX ORDER — SENNOSIDES 8.6 MG
1 TABLET ORAL 2 TIMES DAILY
Status: CANCELLED | OUTPATIENT
Start: 2024-10-30

## 2024-10-30 RX ORDER — DEXTROSE MONOHYDRATE 25 G/50ML
25-50 INJECTION, SOLUTION INTRAVENOUS
Status: CANCELLED | OUTPATIENT
Start: 2024-10-30

## 2024-10-30 RX ORDER — ACETAMINOPHEN 650 MG/1
650 SUPPOSITORY RECTAL EVERY 4 HOURS PRN
Status: CANCELLED | OUTPATIENT
Start: 2024-10-30

## 2024-10-30 RX ORDER — HYDROXYZINE HYDROCHLORIDE 25 MG/1
25 TABLET, FILM COATED ORAL EVERY 6 HOURS PRN
Status: CANCELLED | OUTPATIENT
Start: 2024-10-30

## 2024-10-30 RX ORDER — AMOXICILLIN 250 MG
1 CAPSULE ORAL 2 TIMES DAILY PRN
Status: CANCELLED | OUTPATIENT
Start: 2024-10-30

## 2024-10-30 RX ORDER — ACETAMINOPHEN 325 MG/1
650 TABLET ORAL EVERY 4 HOURS PRN
Status: DISCONTINUED | OUTPATIENT
Start: 2024-10-30 | End: 2024-11-02 | Stop reason: HOSPADM

## 2024-10-30 RX ORDER — NICOTINE POLACRILEX 4 MG
15-30 LOZENGE BUCCAL
Status: DISCONTINUED | OUTPATIENT
Start: 2024-10-30 | End: 2024-11-02 | Stop reason: HOSPADM

## 2024-10-30 RX ORDER — SENNOSIDES 8.6 MG
1 TABLET ORAL 2 TIMES DAILY
Status: DISCONTINUED | OUTPATIENT
Start: 2024-10-30 | End: 2024-11-02 | Stop reason: HOSPADM

## 2024-10-30 RX ORDER — NYSTATIN 100000 U/G
CREAM TOPICAL 2 TIMES DAILY PRN
Status: DISCONTINUED | OUTPATIENT
Start: 2024-10-30 | End: 2024-11-02 | Stop reason: HOSPADM

## 2024-10-30 RX ORDER — DEXTROSE MONOHYDRATE 25 G/50ML
25-50 INJECTION, SOLUTION INTRAVENOUS
Status: DISCONTINUED | OUTPATIENT
Start: 2024-10-30 | End: 2024-11-02 | Stop reason: HOSPADM

## 2024-10-30 RX ORDER — TAMSULOSIN HYDROCHLORIDE 0.4 MG/1
0.4 CAPSULE ORAL DAILY
Status: CANCELLED | OUTPATIENT
Start: 2024-10-31

## 2024-10-30 RX ORDER — LIDOCAINE 40 MG/G
CREAM TOPICAL
Status: CANCELLED | OUTPATIENT
Start: 2024-10-30

## 2024-10-30 RX ORDER — ASPIRIN 81 MG/1
81 TABLET, CHEWABLE ORAL DAILY
Status: DISCONTINUED | OUTPATIENT
Start: 2024-10-31 | End: 2024-11-02 | Stop reason: HOSPADM

## 2024-10-30 RX ORDER — METOPROLOL SUCCINATE 100 MG/1
100 TABLET, EXTENDED RELEASE ORAL DAILY
Status: DISCONTINUED | OUTPATIENT
Start: 2024-10-31 | End: 2024-11-02 | Stop reason: HOSPADM

## 2024-10-30 RX ORDER — TORSEMIDE 20 MG/1
40 TABLET ORAL DAILY
Status: DISCONTINUED | OUTPATIENT
Start: 2024-10-31 | End: 2024-11-01

## 2024-10-30 RX ORDER — TRAZODONE HYDROCHLORIDE 50 MG/1
50 TABLET ORAL
Status: DISCONTINUED | OUTPATIENT
Start: 2024-10-30 | End: 2024-11-02 | Stop reason: HOSPADM

## 2024-10-30 RX ORDER — TORSEMIDE 20 MG/1
40 TABLET ORAL DAILY
Status: CANCELLED | OUTPATIENT
Start: 2024-10-31

## 2024-10-30 RX ORDER — HYDROXYZINE HYDROCHLORIDE 25 MG/1
25 TABLET, FILM COATED ORAL EVERY 6 HOURS PRN
Status: DISCONTINUED | OUTPATIENT
Start: 2024-10-30 | End: 2024-11-02 | Stop reason: HOSPADM

## 2024-10-30 RX ORDER — FERROUS SULFATE 325(65) MG
325 TABLET ORAL EVERY OTHER DAY
Status: CANCELLED | OUTPATIENT
Start: 2024-10-31

## 2024-10-30 RX ORDER — ASCORBIC ACID 500 MG
1000 TABLET ORAL DAILY
Status: DISCONTINUED | OUTPATIENT
Start: 2024-10-31 | End: 2024-11-02 | Stop reason: HOSPADM

## 2024-10-30 RX ORDER — ACETAMINOPHEN 650 MG/1
650 SUPPOSITORY RECTAL EVERY 4 HOURS PRN
Status: DISCONTINUED | OUTPATIENT
Start: 2024-10-30 | End: 2024-11-02 | Stop reason: HOSPADM

## 2024-10-30 RX ORDER — POTASSIUM CHLORIDE 750 MG/1
20 TABLET, EXTENDED RELEASE ORAL DAILY
Status: DISCONTINUED | OUTPATIENT
Start: 2024-10-31 | End: 2024-11-02 | Stop reason: HOSPADM

## 2024-10-30 RX ORDER — LEVOTHYROXINE SODIUM 175 UG/1
175 TABLET ORAL
Status: CANCELLED | OUTPATIENT
Start: 2024-10-31

## 2024-10-30 RX ORDER — UREA 1 ML/10ML
LOTION TOPICAL 2 TIMES DAILY PRN
Status: DISCONTINUED | OUTPATIENT
Start: 2024-10-30 | End: 2024-10-30 | Stop reason: HOSPADM

## 2024-10-30 RX ORDER — AMOXICILLIN 250 MG
2 CAPSULE ORAL 2 TIMES DAILY PRN
Status: CANCELLED | OUTPATIENT
Start: 2024-10-30

## 2024-10-30 RX ORDER — LIDOCAINE 40 MG/G
CREAM TOPICAL
Status: DISCONTINUED | OUTPATIENT
Start: 2024-10-30 | End: 2024-11-02 | Stop reason: HOSPADM

## 2024-10-30 RX ORDER — TRAZODONE HYDROCHLORIDE 50 MG/1
50 TABLET, FILM COATED ORAL
Status: CANCELLED | OUTPATIENT
Start: 2024-10-30

## 2024-10-30 RX ORDER — AMOXICILLIN 250 MG
1 CAPSULE ORAL 2 TIMES DAILY PRN
Status: DISCONTINUED | OUTPATIENT
Start: 2024-10-30 | End: 2024-11-02 | Stop reason: HOSPADM

## 2024-10-30 RX ORDER — ONDANSETRON 4 MG/1
4 TABLET, ORALLY DISINTEGRATING ORAL EVERY 6 HOURS PRN
Status: CANCELLED | OUTPATIENT
Start: 2024-10-30

## 2024-10-30 RX ORDER — NYSTATIN 100000 U/G
CREAM TOPICAL 2 TIMES DAILY PRN
Status: CANCELLED | OUTPATIENT
Start: 2024-10-30

## 2024-10-30 RX ORDER — SPIRONOLACTONE 25 MG/1
50 TABLET ORAL DAILY
Status: CANCELLED | OUTPATIENT
Start: 2024-10-31

## 2024-10-30 RX ORDER — ONDANSETRON 4 MG/1
4 TABLET, ORALLY DISINTEGRATING ORAL EVERY 6 HOURS PRN
Status: DISCONTINUED | OUTPATIENT
Start: 2024-10-30 | End: 2024-11-02 | Stop reason: HOSPADM

## 2024-10-30 RX ORDER — MULTIPLE VITAMINS W/ MINERALS TAB 9MG-400MCG
1 TAB ORAL DAILY
Status: CANCELLED | OUTPATIENT
Start: 2024-10-31

## 2024-10-30 RX ORDER — CALCIUM CARBONATE 500 MG/1
1000 TABLET, CHEWABLE ORAL 4 TIMES DAILY PRN
Status: DISCONTINUED | OUTPATIENT
Start: 2024-10-30 | End: 2024-11-02 | Stop reason: HOSPADM

## 2024-10-30 RX ORDER — CALCIUM CARBONATE 500 MG/1
1000 TABLET, CHEWABLE ORAL 4 TIMES DAILY PRN
Status: CANCELLED | OUTPATIENT
Start: 2024-10-30

## 2024-10-30 RX ORDER — POTASSIUM CHLORIDE 750 MG/1
20 TABLET, EXTENDED RELEASE ORAL DAILY
Status: CANCELLED | OUTPATIENT
Start: 2024-10-31

## 2024-10-30 RX ADMIN — ASPIRIN 81 MG CHEWABLE TABLET 81 MG: 81 TABLET CHEWABLE at 08:10

## 2024-10-30 RX ADMIN — POTASSIUM CHLORIDE 20 MEQ: 750 TABLET, EXTENDED RELEASE ORAL at 08:10

## 2024-10-30 RX ADMIN — METFORMIN HYDROCHLORIDE 500 MG: 500 TABLET, FILM COATED ORAL at 18:33

## 2024-10-30 RX ADMIN — WARFARIN SODIUM 12.5 MG: 10 TABLET ORAL at 18:33

## 2024-10-30 RX ADMIN — TORSEMIDE 40 MG: 20 TABLET ORAL at 12:22

## 2024-10-30 RX ADMIN — SENNOSIDES 1 TABLET: 8.6 TABLET, FILM COATED ORAL at 08:10

## 2024-10-30 RX ADMIN — CHLORHEXIDINE GLUCONATE 0.12% ORAL RINSE 15 ML: 1.2 LIQUID ORAL at 08:13

## 2024-10-30 RX ADMIN — METFORMIN HYDROCHLORIDE 500 MG: 500 TABLET, FILM COATED ORAL at 08:10

## 2024-10-30 RX ADMIN — OXYCODONE HYDROCHLORIDE AND ACETAMINOPHEN 1000 MG: 500 TABLET ORAL at 08:10

## 2024-10-30 RX ADMIN — LEVOTHYROXINE SODIUM 175 MCG: 175 TABLET ORAL at 06:49

## 2024-10-30 RX ADMIN — TAMSULOSIN HYDROCHLORIDE 0.4 MG: 0.4 CAPSULE ORAL at 12:23

## 2024-10-30 RX ADMIN — METOPROLOL SUCCINATE 100 MG: 100 TABLET, EXTENDED RELEASE ORAL at 08:09

## 2024-10-30 RX ADMIN — Medication 25 MCG: at 08:10

## 2024-10-30 RX ADMIN — UREA: 1 LOTION TOPICAL at 19:00

## 2024-10-30 RX ADMIN — ATORVASTATIN CALCIUM 40 MG: 40 TABLET, FILM COATED ORAL at 08:09

## 2024-10-30 RX ADMIN — SENNOSIDES 1 TABLET: 8.6 TABLET, FILM COATED ORAL at 20:47

## 2024-10-30 NOTE — PLAN OF CARE
[id number]    Goal Outcome Evaluation:      Plan of Care Reviewed With: patient    Overall Patient Progress: no change    Patient alert and oriented, stable on room air. Up assist x2 with marisa steady to bedside commode, using urinal. Edema wraps on BLE. Right PIV, saline locked. Tolerating regular diet, denies nausea/vomiting. BG checks BID. Able to make needs known. Call light within reach. Continue with current plan of care.

## 2024-10-30 NOTE — PHARMACY-ANTICOAGULATION SERVICE
Clinical Pharmacy - Warfarin Dosing Consult     Pharmacy has been consulted to manage this patient s warfarin therapy.  Indication: Atrial Fibrillation  Therapy Goal: INR 2-3  OP Anticoag Clinic: [facility]  Warfarin Prior to Admission: Yes  Warfarin PTA Regimen: 10 mg daily (5 mg on 10/17 at TCU but otherwise has been 10 mg qday)  Significant drug interactions: acetaminophen (may inc INR, moderate interaction), ASA (increased risk of bleeding)    INR   Date Value Ref Range Status   10/30/2024 1.67 (H) 0.85 - 1.15 Final   10/29/2024 1.73 (H) 0.85 - 1.15 Final       Recommend warfarin 12.5 mg today.  Pharmacy will monitor Clarke Moreira daily and order warfarin doses to achieve specified goal.      Please contact pharmacy as soon as possible if the warfarin needs to be held for a procedure or if the warfarin goals change.

## 2024-10-30 NOTE — MEDICATION SCRIBE - ADMISSION MEDICATION HISTORY
Admission medication history completed at [facility]. Please see Medication Scribe Admission Medication History note from 10/25/2024.

## 2024-10-30 NOTE — PLAN OF CARE
Goal Outcome Evaluation:      Plan of Care Reviewed With: patient    Overall Patient Progress: no change    Outcome Evaluation: Pt is calm and cooperative with cares.      O2: On RA   Output: Continent of B/B, voiding spontaneously without difficulty   Last BM: 10/30/24, had a BM this shift   Activity: Assist x2 with sera steady   Skin: X  scab on BLE    Pain: denies   CMS:  Neuro: Intact  A/O x4, able to make needs known   Dressing: R PIV CDI, saline locked, wound cares done for WOC orders   Diet: Regular   LDA: R PIV, saline locked   Equipment: commode   Plan: Con't POC.    Additional Info: VSS on RA, denies SOB, CP and N/V.

## 2024-10-30 NOTE — CONSULTS
"Care Management Medical [facility] Outpatient Consult    Case discussed in rounds this AM (provider and charge RN involved in discussion).  Per provider, he intends to transition pt to intermediate care, given that pt remains medically ready for discharge and TCU placement is anticipated to take >24 hours d/t need for OBRA II assessment.  Of note, Nataliia Cliff [facility] addressed a intermediate consult on 10/28/24 and anticipated a switch to intermediate care.  Nataliia had a discussion w/ pt to inform him of potential lack of insurance coverage for continued hospitalization.  As such, a intermediate consult has already been addressed by care mgmt - please reference Nataliia's note for further details.  Please see CM/SW notes for ongoing care mgmt involvement.  Once pt's chart is changed to intermediate status, care mgmt intends to follow protocol in continuing to address discharge planning.        PAULA Grimes  [facility]  Units 8M/S & 10 ICU  Reachable on [employer] - Search by name or search \"RNCC\"  Phone: [phone]        "

## 2024-10-30 NOTE — H&P
"[facility]    History and Physical - Hospitalist Service       Date of skilled nursing Care Admission:  (Not on file)    Assessment & Plan   Clarke Moreira is a 74 year old male being transitioned to [facility] care on 10/30/2024. Please see the encounters from the same date for more details of his presentation and workup.    skilled nursing Care Admission.  -social work consulting for assistance  Clarke Moreira is a 74 year old male with history of DMII, CKD, HFpEF, A fib, depression, THONG, chronic anemia, hypothyroidism, HLD, and BPH who was admitted to [facility] 10/25/2024 with mechanical fall, MICHELLE, and inability to care for himself. PTA fell walking to the kitchen due to knees buckling. No LOC, did not hit head.  Of note, recently admitted to [facility] 9/22 to 10/3 with similar presentation and was found to have citrobacter bacteremia. Recently discharged from TCU but struggled to care for self, perform ADLs, and limited PO intake since discharge.      Changes today:  - discussed with UR - observation status today, if not able to discharge tomorrow - [facility] care  - lymphedema consult  - increase diuretics to home dosing tomorrow     Depression:  - seen by psych, no acute inpatient needs or precautions. No med changes     Mechanical fall  Lower back pain    Bilateral knee  hip pain   -Head CT and cervical/thoracic/lumbar spine CT no acute findings.   -XR R knee and pelvis no acute findings. I told patient  they did right sided XR as when he came in he complained of pain on right side , he told me \" well that is what you been told but that is not what I said\"   - complains of  left hip pain post fall,  XR  shows no fracture , discussed with  patient    - UA negative for signs of infection   - BCx x1 10/25, no growth to date   - PT, OT, nutrition consults   - Pain management with Tylenol and Lidoderm patches     Hypotension:   -BP 80s-110s/50s-70s in the ED, responded to IV fluids  "   - Encourage oral intake  - Infectious workup   - blood pressure  now good  - monitor with restarting heart failure  medications      Failure to thrive  - has not had good appetite, not able to cook an dcare for himself  - nutritionist to saw  - physical therapy  Occupational therapy    -       Hypertension    -blood pressure  improved  so metoprolol  resumed with holding parameters      HFpEF:   -Last echo 3/2021 technically difficult, EF 50-55%, global RV function mildly reduced with mild RV dilation. PTA on Metoprolol back on it   -PTA  Spironolactone, Torsemide 80 mg daily, Kcl all on hold, monitor .   - staring to have some leg edema , so  restarting   torsemide at 40mg ( on 80 at home )  for now and adjust as needed and montior renal functions , also stared Kcl at 20, takes 40 bid at home   - Hold spironolactone for now,   - Echo  10/25:  EF 55-60%       Paroxysmal A fib  Long term anticoagulation   -CHADSVASC 5. PTA on Coumadin and Metoprolol.   - Coumadin per pharmacy  - Continue metoprolol with hold parameters      Elevated troponin  - trop flat  - no symptoms   - EKG atrial fibrillation  no acute st t wave changes      Chronic hypercapnic respiratory failure   THONG:   -Continue CPAP   - no current issues      AGMA:  - resolved      MICHELLE on CKD III:   -baseline Cr around 1. Elevated to 1.77 10/25, BUN 23.1. Likely prerenal in the setting, on diuretics  of poor oral intake.   - received IV fluids  and now creatinine close to baseline   - slowly restarting diuretics , monitor renal functions   -  UA negative for infection  - CK 75   - avoid nephrotoxic agents      Constipation  - states has not had BM since admitted to TCU, did have some watery stool Thursday suspect due to overflow diarrhea   - abdominal XR  does show moderate  to large  colonic stool burden , needs aggressive bowel regime, enemas , laxatives  , discussed with  patient       T2DM with neuropathy :   - ling standing left lateral  "thigh numbness  -A1c 6.2 10/7. PTA on Metformin only.   - restarted  Metformin  500 mg bid 10/28, accu checks bid     HLD:  - Continue PTA ASA and statin     Depression, anxiety :   No PTA meds.  -feels very depressed, also feels anxious   - will ask psych to see on Monday      Chronic microcytic anemia:   - baseline  hgb 10.5-12 and stable.   - Iron low to 14 9/22/2024.   -PTA on iron, continue, was started on last admssion  - out patient  GI work up       Hypothyroidism:   TSH 0.93 10/7/24. Continue synthroid      Osteopenia on XR  - defer back to PMD   - vit d levels were nl last admission     BPH:   Continue Flomax     Chronic wounds:   Per notes, 2/2 DM. [facility] consulted     Peridontal disease  -  during last admission was seen by dentistry,  on evaluation \"possible root tip maxillary left second molar (#15). Dentistry recs:- start chlorhexidine oral rinse BID for 4-8 weeks  - follow up with hospital dental clinic in [facility] for further evaluation.\"     MDD  CHRIS  - not on medications PTA, see above    Home medication reconciliation.  -continue current home medications       Diet:  regular    Expected Discharge: working on discharge plans with care coordination  Disposition difficulty: placement    Damien Hudson MD  Hospitalist Service  [facility]  Securely message with [employer] (more info)  Text page via [facility] Paging/Directory     ______________________________________________________________________    Chief Complaint   Transitioning to [facility] care    History of Present Illness   Clarke Moreira is a 74 year old male who is being transitioned to [facility] care.  Please see the other encounters from the same date for more details; a brief summary of her current symptoms and situation is included here.    Prior to Admission Medications   Cannot display prior to admission medications because the patient has not been admitted in this contact.    "     Physical Exam   Vital Signs:                    Weight: 0 lbs 0 oz    Physical exam deferred given [facility] status

## 2024-10-30 NOTE — PLAN OF CARE
Occupational Therapy: Orders received. Chart reviewed and discussed with care team.? Occupational Therapy not indicated due to pt appropriate for single discipline at this time, has TCU recs in place and an accepting facility.? Defer discharge recommendations to physical therapy, medical team.? Will complete orders.     Edema consult also received. Pt currently being followed by [facility] who is using Edemawear to manage pt B LE edema and facilitate fluid mobilization to aid in wound healing. Given pt is medically ready for discharge, not appropriate to initiate additional compression. Will defer edema at this time and pt may f/u at next level of care. Recommend continued conservative management (elevation, muscle activation, excellent skin cares) and use of Edemawear per WOC. Recommend OP Lymphedema for long-term management. Please re-consult if there is a change in pt discharge plan.

## 2024-10-30 NOTE — PROGRESS NOTES
10/30/24  Care Management Follow Up    CHW got a call from [facility], [place] ([phone]) asking for clinical notes for the obra level II. Clinical notes were faxed to [phone]. This was notified to the RNCC on the unit.    Tiffanie Tovar  Inpatient CHW  [facility] 5 Ortho, 8 Med Surge, & ED  PH: [phone]

## 2024-10-30 NOTE — DISCHARGE SUMMARY
"[facility]  Hospitalist Discharge Summary      Date of Admission:  10/25/2024  Date of Discharge:  10/30/2024  Discharging Provider: Damien Hudson MD  Discharge Service: Hospitalist Service, GOLD TEAM 21    Discharge Diagnoses   Mechanical fall  Lower back pain    Bilateral knee  hip pain  Hypotension  HFpEF  FTT  pAF  THONG  AGMA  CKD3    Follow-ups Needed After Discharge       Unresulted Labs Ordered in the Past 30 Days of this Admission       No orders found from 9/25/2024 to 10/26/2024.        These results will be followed up by na    Discharge Disposition   Transferred to custoidial care  Condition at discharge: Stable    Hospital Course   Clarke Moreira is a 74 year old male with history of DMII, CKD, HFpEF, A fib, depression, THONG, chronic anemia, hypothyroidism, HLD, and BPH who was admitted to [facility] 10/25/2024 with mechanical fall, MICHELLE, and inability to care for himself. PTA fell walking to the kitchen due to knees buckling. No LOC, did not hit head.  Of note, recently admitted to [facility] 9/22 to 10/3 with similar presentation and was found to have citrobacter bacteremia. Recently discharged from TCU but struggled to care for self, perform ADLs, and limited PO intake since discharge.      Changes today:  - discussed with UR - observation status today, if not able to discharge tomorrow - care home care  - lymphedema consult  - increase diuretics to home dosing tomorrow     Depression:  - seen by psych, no acute inpatient needs or precautions. No med changes     Mechanical fall  Lower back pain    Bilateral knee  hip pain   -Head CT and cervical/thoracic/lumbar spine CT no acute findings.   -XR R knee and pelvis no acute findings. I told patient  they did right sided XR as when he came in he complained of pain on right side , he told me \" well that is what you been told but that is not what I said\"   - complains of  left hip pain post fall,  XR  shows no " fracture , discussed with  patient    - UA negative for signs of infection   - BCx x1 10/25, no growth to date   - PT, OT, nutrition consults   - Pain management with Tylenol and Lidoderm patches     Hypotension:   -BP 80s-110s/50s-70s in the ED, responded to IV fluids    - Encourage oral intake  - Infectious workup   - blood pressure  now good  - monitor with restarting heart failure  medications      Failure to thrive  - has not had good appetite, not able to cook an dcare for himself  - nutritionist to saw  - physical therapy  Occupational therapy    -       Hypertension    -blood pressure  improved  so metoprolol  resumed with holding parameters      HFpEF:   -Last echo 3/2021 technically difficult, EF 50-55%, global RV function mildly reduced with mild RV dilation. PTA on Metoprolol back on it   -PTA  Spironolactone, Torsemide 80 mg daily, Kcl all on hold, monitor .   - staring to have some leg edema , so  restarting   torsemide at 40mg ( on 80 at home )  for now and adjust as needed and montior renal functions , also stared Kcl at 20, takes 40 bid at home   - Hold spironolactone for now,   - Echo  10/25:  EF 55-60%       Paroxysmal A fib  Long term anticoagulation   -CHADSVASC 5. PTA on Coumadin and Metoprolol.   - Coumadin per pharmacy  - Continue metoprolol with hold parameters      Elevated troponin  - trop flat  - no symptoms   - EKG atrial fibrillation  no acute st t wave changes      Chronic hypercapnic respiratory failure   THONG:   -Continue CPAP   - no current issues      AGMA:  - resolved      MICHELLE on CKD III:   -baseline Cr around 1. Elevated to 1.77 10/25, BUN 23.1. Likely prerenal in the setting, on diuretics  of poor oral intake.   - received IV fluids  and now creatinine close to baseline   - slowly restarting diuretics , monitor renal functions   -  UA negative for infection  - CK 75   - avoid nephrotoxic agents      Constipation  - states has not had BM since admitted to TCU, did  "have some watery stool Thursday suspect due to overflow diarrhea   - abdominal XR  does show moderate  to large  colonic stool burden , needs aggressive bowel regime, enemas , laxatives  , discussed with  patient       T2DM with neuropathy :   - ling standing left lateral thigh numbness  -A1c 6.2 10/7. PTA on Metformin only.   - restarted  Metformin  500 mg bid 10/28, accu checks bid     HLD:  - Continue PTA ASA and statin     Depression, anxiety :   No PTA meds.  -feels very depressed, also feels anxious   - will ask psych to see on Monday      Chronic microcytic anemia:   - baseline  hgb 10.5-12 and stable.   - Iron low to 14 9/22/2024.   -PTA on iron, continue, was started on last admssion  - out patient  GI work up       Hypothyroidism:   TSH 0.93 10/7/24. Continue synthroid      Osteopenia on XR  - defer back to PMD   - vit d levels were nl last admission     BPH:   Continue Flomax     Chronic wounds:   Per notes, 2/2 DM. WONC consulted     Peridontal disease  -  during last admission was seen by dentistry,  on evaluation \"possible root tip maxillary left second molar (#15). Dentistry recs:- start chlorhexidine oral rinse BID for 4-8 weeks  - follow up with hospital dental clinic in [facility] for further evaluation.\"     MDD  CHRIS  - not on medications PTA, see above     Consultations This Hospital Stay   PHYSICAL THERAPY ADULT IP CONSULT  OCCUPATIONAL THERAPY ADULT IP CONSULT  PHARMACY TO DOSE WARFARIN  NUTRITION SERVICES ADULT IP CONSULT  WOUND OSTOMY CONTINENCE NURSE  IP CONSULT  CARE MANAGEMENT / SOCIAL WORK IP CONSULT  PSYCHIATRY IP CONSULT  CARE MANAGEMENT / SOCIAL WORK IP CONSULT  PHARMACY IP CONSULT  CARE MANAGEMENT / SOCIAL WORK IP CONSULT  LYMPHEDEMA THERAPY IP CONSULT  PSYCHIATRY IP CONSULT  OCCUPATIONAL THERAPY ADULT IP CONSULT  PHARMACY TO DOSE WARFARIN  NUTRITION SERVICES ADULT IP CONSULT  LYMPHEDEMA THERAPY IP CONSULT    Code Status   No CPR- Do NOT Intubate    Time Spent on this Encounter   I, " Damien Hudson MD, personally saw the patient today and spent greater than 30 minutes discharging this patient.         Damien Hudson MD  [facility] [address]  [address]  [place] [address]  Phone: [phone]  Fax: [phone]  ______________________________________________________________________    Physical Exam   Vital Signs: Temp: 98.4  F (36.9  C) Temp src: Oral BP: 139/55 Pulse: 85   Resp: 18 SpO2: 96 % O2 Device: None (Room air)    Weight: 351 lbs 6.61 oz  GASTROINTESTINAL:soft, non-distended , non-tender , + bowel sounds,    NEUROLOGIC; awake alert and oriented,   EXTREMITIES: no clubbing, cyanosis, legs in stockings, some pedal edema        Primary Care Physician   Henrry Shepard    Discharge Orders       Significant Results and Procedures   Most Recent 3 CBC's:  Recent Labs   Lab Test 10/28/24  0612 10/27/24  0706 10/26/24  0752   WBC 8.3 8.9 6.6   HGB 11.5* 11.1* 12.0*   MCV 78 77* 76*    278 282     Most Recent 3 BMP's:  Recent Labs   Lab Test 10/30/24  1223 10/30/24  0654 10/29/24  1707 10/28/24  0645 10/28/24  0612 10/27/24  1046 10/27/24  0706 10/27/24  0250 10/26/24  1126 10/26/24  0752   NA  --   --   --   --  136  --  135  --   --  136   POTASSIUM  --   --   --   --  3.8  --  3.6 3.5  --  3.0*   CHLORIDE  --   --   --   --  101  --  97*  --   --  96*   CO2  --   --   --   --  25  --  29  --   --  27   BUN  --   --   --   --  10.7  --  11.5  --   --  16.3   CR  --   --   --   --  0.90  --  1.06  --   --  1.17   ANIONGAP  --   --   --   --  10  --  9  --   --  13   CHERI  --   --   --   --  9.5  --  9.0  --   --  9.6   GLC 96 109* 146*   < > 125*   < > 129*  --    < > 120*  115*    < > = values in this interval not displayed.       Discharge Medications     Current Facility-Administered Medications:     acetaminophen (TYLENOL) tablet 650 mg, 650 mg, Oral, Q4H PRN **OR** acetaminophen (TYLENOL) Suppository 650 mg, 650 mg, Rectal, Q4H PRN, Dulce Larson,  PA-TERRY    artificial saliva (BIOTENE MT) solution 1 spray, 1 spray, Mouth/Throat, 4x Daily, Dulce Larson PA-C, 1 spray at 10/26/24 2015    aspirin (ASA) chewable tablet 81 mg, 81 mg, Oral, Daily, Dulce Larson PA-C, 81 mg at 10/30/24 0810    atorvastatin (LIPITOR) tablet 40 mg, 40 mg, Oral, Daily, Dulce Larson PA-C, 40 mg at 10/30/24 0809    benzocaine-menthol (CHLORASEPTIC) 6-10 MG lozenge 1 lozenge, 1 lozenge, Buccal, Q1H PRN, Chaim Dolan MD    calcium carbonate (TUMS) chewable tablet 1,000 mg, 1,000 mg, Oral, 4x Daily PRN, Dulce Larson PA-C    chlorhexidine (PERIDEX) 0.12 % solution 15 mL, 15 mL, Swish & Spit, BID, Lucila Sorensen MD, 15 mL at 10/30/24 0813    glucose gel 15-30 g, 15-30 g, Oral, Q15 Min PRN **OR** dextrose 50 % injection 25-50 mL, 25-50 mL, Intravenous, Q15 Min PRN **OR** glucagon injection 1 mg, 1 mg, Subcutaneous, Q15 Min PRN, Dulce Larson PA-C    ferrous sulfate (FEROSUL) tablet 325 mg, 325 mg, Oral, Every Other Day, Dulce Larson PA-C, 325 mg at 10/29/24 1338    hydrOXYzine HCl (ATARAX) tablet 25 mg, 25 mg, Oral, Q6H PRN, Tab Harkins APRN CNP    levothyroxine (SYNTHROID/LEVOTHROID) tablet 175 mcg, 175 mcg, Oral, QAM AC, Dulce Larson PA-C, 175 mcg at 10/30/24 0649    Lidocaine (LIDOCARE) 4 % Patch 2 patch, 2 patch, Transdermal, Q24H, Dulce Larson PA-C, 2 patch at 10/26/24 0802    lidocaine (LMX4) cream, , Topical, Q1H PRN, Dulce Larson PA-C    lidocaine 1 % 0.1-1 mL, 0.1-1 mL, Other, Q1H PRN, Dulce Larson PA-C    metFORMIN (GLUCOPHAGE) tablet 500 mg, 500 mg, Oral, BID w/meals, Lucila Sorensen MD, 500 mg at 10/30/24 0810    metoprolol succinate ER (TOPROL XL) 24 hr tablet 100 mg, 100 mg, Oral, Daily, Lucila Sorensen MD, 100 mg at 10/30/24 0809    multivitamin w/minerals (THERA-VIT-M) tablet 1 tablet, 1 tablet, Oral, Daily, Dulce Larson  LARRY Bo, 1 tablet at 10/29/24 0938    nystatin (MYCOSTATIN) cream, , Topical, BID PRN, Dulce Larson PA-C    ondansetron (ZOFRAN ODT) ODT tab 4 mg, 4 mg, Oral, Q6H PRN **OR** ondansetron (ZOFRAN) injection 4 mg, 4 mg, Intravenous, Q6H PRN, Dulce Larson PA-C    Patient is already receiving anticoagulation with heparin, enoxaparin (LOVENOX), warfarin (COUMADIN)  or other anticoagulant medication, , Does not apply, Continuous PRN, Dulce Larson PA-C    potassium chloride chen ER (KLOR-CON M10) CR tablet 20 mEq, 20 mEq, Oral, Daily, Lucila Sorensen MD, 20 mEq at 10/30/24 0810    senna-docusate (SENOKOT-S/PERICOLACE) 8.6-50 MG per tablet 1 tablet, 1 tablet, Oral, BID PRN, 1 tablet at 10/26/24 2015 **OR** senna-docusate (SENOKOT-S/PERICOLACE) 8.6-50 MG per tablet 2 tablet, 2 tablet, Oral, BID PRN, Dulce Larson PA-C    sennosides (SENOKOT) tablet 1 tablet, 1 tablet, Oral, BID, Lucila Sorensen MD, 1 tablet at 10/30/24 0810    sodium chloride (PF) 0.9% PF flush 3 mL, 3 mL, Intracatheter, Q8H, Dulce Larson PA-C, 3 mL at 10/29/24 1759    sodium chloride (PF) 0.9% PF flush 3 mL, 3 mL, Intracatheter, q1 min prn, Dulce Larson PA-C    [Held by provider] spironolactone (ALDACTONE) tablet 50 mg, 50 mg, Oral, Daily, Dulce Larson PA-C    tamsulosin (FLOMAX) capsule 0.4 mg, 0.4 mg, Oral, Daily, Sybil Manning, RPH, 0.4 mg at 10/30/24 1223    torsemide (DEMADEX) tablet 40 mg, 40 mg, Oral, Daily, Lucila Sorensen MD, 40 mg at 10/30/24 1222    traZODone (DESYREL) tablet 50 mg, 50 mg, Oral, At Bedtime PRN, Tab Harkins, APRN CNP    urea (CARMOL) 10 % cream, , Topical, BID PRN, Dulce Larson PA-C    vitamin C (ASCORBIC ACID) tablet 1,000 mg, 1,000 mg, Oral, Daily, Dulce Larson PA-C, 1,000 mg at 10/30/24 0810    Vitamin D3 (CHOLECALCIFEROL) tablet 25 mcg, 25 mcg, Oral, Daily, Dulce Larson,  PA-C, 25 mcg at 10/30/24 0810    warfarin ANTICOAGULANT (COUMADIN) tablet 12.5 mg, 12.5 mg, Oral, ONCE at 18:00, Damien Hudson MD    Warfarin Dose Required Daily - Pharmacist Managed, 1 each, Oral, See Admin Instructions, Chaim Dolan MD    Allergies   Allergies   Allergen Reactions    Seasonal Allergies

## 2024-10-31 ENCOUNTER — TELEPHONE (OUTPATIENT)
Dept: FAMILY MEDICINE | Facility: CLINIC | Age: 74
End: 2024-10-31
Payer: COMMERCIAL

## 2024-10-31 ENCOUNTER — DOCUMENTATION ONLY (OUTPATIENT)
Dept: ANTICOAGULATION | Facility: CLINIC | Age: 74
End: 2024-10-31
Payer: COMMERCIAL

## 2024-10-31 DIAGNOSIS — I48.20 CHRONIC ATRIAL FIBRILLATION (H): Primary | ICD-10-CM

## 2024-10-31 DIAGNOSIS — Z79.01 LONG TERM (CURRENT) USE OF ANTICOAGULANTS: ICD-10-CM

## 2024-10-31 DIAGNOSIS — I48.0 PAROXYSMAL ATRIAL FIBRILLATION (H): ICD-10-CM

## 2024-10-31 LAB
GLUCOSE BLDC GLUCOMTR-MCNC: 104 MG/DL (ref 70–99)
GLUCOSE BLDC GLUCOMTR-MCNC: 109 MG/DL (ref 70–99)
GLUCOSE BLDC GLUCOMTR-MCNC: 122 MG/DL (ref 70–99)
HOLD SPECIMEN: NORMAL
HOLD SPECIMEN: NORMAL
INR PPP: 1.72 (ref 0.85–1.15)

## 2024-10-31 PROCEDURE — 82962 GLUCOSE BLOOD TEST: CPT

## 2024-10-31 PROCEDURE — 101N000002 HC CUSTODIAL CARE DAILY

## 2024-10-31 PROCEDURE — 250N000013 HC RX MED GY IP 250 OP 250 PS 637: Performed by: STUDENT IN AN ORGANIZED HEALTH CARE EDUCATION/TRAINING PROGRAM

## 2024-10-31 PROCEDURE — 36415 COLL VENOUS BLD VENIPUNCTURE: CPT | Performed by: STUDENT IN AN ORGANIZED HEALTH CARE EDUCATION/TRAINING PROGRAM

## 2024-10-31 PROCEDURE — 85610 PROTHROMBIN TIME: CPT | Performed by: STUDENT IN AN ORGANIZED HEALTH CARE EDUCATION/TRAINING PROGRAM

## 2024-10-31 RX ORDER — SALIVA STIMULANT COMB. NO.3
1 SPRAY, NON-AEROSOL (ML) MUCOUS MEMBRANE 4 TIMES DAILY PRN
Status: DISCONTINUED | OUTPATIENT
Start: 2024-10-31 | End: 2024-11-02 | Stop reason: HOSPADM

## 2024-10-31 RX ADMIN — WARFARIN SODIUM 12.5 MG: 10 TABLET ORAL at 18:28

## 2024-10-31 RX ADMIN — TORSEMIDE 40 MG: 20 TABLET ORAL at 12:18

## 2024-10-31 RX ADMIN — OXYCODONE HYDROCHLORIDE AND ACETAMINOPHEN 1000 MG: 500 TABLET ORAL at 08:47

## 2024-10-31 RX ADMIN — SENNOSIDES 1 TABLET: 8.6 TABLET, FILM COATED ORAL at 08:47

## 2024-10-31 RX ADMIN — ATORVASTATIN CALCIUM 40 MG: 40 TABLET, FILM COATED ORAL at 08:47

## 2024-10-31 RX ADMIN — Medication 1 TABLET: at 08:47

## 2024-10-31 RX ADMIN — CHLORHEXIDINE GLUCONATE 0.12% ORAL RINSE 15 ML: 1.2 LIQUID ORAL at 19:42

## 2024-10-31 RX ADMIN — METOPROLOL SUCCINATE 100 MG: 100 TABLET, EXTENDED RELEASE ORAL at 08:50

## 2024-10-31 RX ADMIN — CHLORHEXIDINE GLUCONATE 0.12% ORAL RINSE 15 ML: 1.2 LIQUID ORAL at 08:51

## 2024-10-31 RX ADMIN — SENNOSIDES 1 TABLET: 8.6 TABLET, FILM COATED ORAL at 19:42

## 2024-10-31 RX ADMIN — METFORMIN HYDROCHLORIDE 500 MG: 500 TABLET, FILM COATED ORAL at 18:27

## 2024-10-31 RX ADMIN — METFORMIN HYDROCHLORIDE 500 MG: 500 TABLET, FILM COATED ORAL at 08:47

## 2024-10-31 RX ADMIN — TAMSULOSIN HYDROCHLORIDE 0.4 MG: 0.4 CAPSULE ORAL at 08:47

## 2024-10-31 RX ADMIN — POTASSIUM CHLORIDE 20 MEQ: 750 TABLET, EXTENDED RELEASE ORAL at 08:47

## 2024-10-31 RX ADMIN — ASPIRIN 81 MG CHEWABLE TABLET 81 MG: 81 TABLET CHEWABLE at 08:47

## 2024-10-31 RX ADMIN — Medication 25 MCG: at 08:47

## 2024-10-31 RX ADMIN — LEVOTHYROXINE SODIUM 175 MCG: 175 TABLET ORAL at 06:59

## 2024-10-31 RX ADMIN — FERROUS SULFATE TAB 325 MG (65 MG ELEMENTAL FE) 325 MG: 325 (65 FE) TAB at 12:18

## 2024-10-31 NOTE — PLAN OF CARE
[id number]    Goal Outcome Evaluation:    Patient alert and oriented, stable on room air. Assist x2 with sera steady, not out of bed this shift. BLE have edema wear. Right PIV, saline locked. Tolerating regular diet, denies nausea/vomiting. Able to make needs known. Call light within reach. Continue with current plan of care.

## 2024-10-31 NOTE — PROGRESS NOTES
Care Management Follow Up    Length of Stay (days): 0    Expected Discharge Date:  TBD     Concerns to be Addressed:     Discharge planning  Patient plan of care discussed at interdisciplinary rounds: Yes    Anticipated Discharge Disposition:  TCU              Anticipated Discharge Services:  Discharge planning  Anticipated Discharge DME:  None    Patient/family educated on Medicare website which has current facility and service quality ratings:  Yes  Education Provided on the Discharge Plan:  Yes  Patient/Family in Agreement with the Plan:  Yes    Referrals Placed by CM/SW:    Private pay costs discussed: Not applicable    Discussed  Partnership in Safe Discharge Planning  document with patient/family: No     Handoff Completed: No, handoff not indicated or clinically appropriate    Additional Information:  Writer called OBRA level 2  Any Kate ([phone]) and [facility] requesting a call back with an update on when Any expects to make a decision about the OBRA level 2. Writer also emailed [facility] email requesting an update of this pt. [email or web address]     Next Steps: Coordinate discharge planning      YAAKOV Garcia   [facility]   Ph: [phone]  [facility]  Care Management Department    Securely message me on Vocera

## 2024-10-31 NOTE — PROGRESS NOTES
"[facility]    Medicine Progress Note - Hospitalist Service, GOLD TEAM 21    Date of Admission:  10/30/2024    Assessment & Plan   Clarke Moreira is a 74 year old male being transitioned to [facility] care on 10/30/2024. Please see the encounters from the same date for more details of his presentation and workup.    assisted Care .  -social work consulting for assistance  Clarke Moreira is a 74 year old male with history of DMII, CKD, HFpEF, A fib, depression, THONG, chronic anemia, hypothyroidism, HLD, and BPH who was admitted to [facility] 10/25/2024 with mechanical fall, MICHELLE, and inability to care for himself. PTA fell walking to the kitchen due to knees buckling. No LOC, did not hit head.  Of note, recently admitted to [facility] 9/22 to 10/3 with similar presentation and was found to have citrobacter bacteremia. Recently discharged from TCU but struggled to care for self, perform ADLs, and limited PO intake since discharge.      Depression:  - seen by psych, no acute inpatient needs or precautions. No med changes     Mechanical fall  Lower back pain    Bilateral knee  hip pain   -Head CT and cervical/thoracic/lumbar spine CT no acute findings.   -XR R knee and pelvis no acute findings. I told patient  they did right sided XR as when he came in he complained of pain on right side , he told me \" well that is what you been told but that is not what I said\"   - complains of  left hip pain post fall,  XR  shows no fracture , discussed with  patient    - UA negative for signs of infection   - BCx x1 10/25, no growth to date   - PT, OT, nutrition consults   - Pain management with Tylenol and Lidoderm patches     Hypotension:   -BP 80s-110s/50s-70s in the ED, responded to IV fluids    - Encourage oral intake  - Infectious workup   - blood pressure  now good       Failure to thrive  - nutritionist to saw  - physical therapy  Occupational therapy    -     "   Hypertension    -blood pressure  improved  so metoprolol  resumed with holding parameters      HFpEF:   -Last echo 3/2021 technically difficult, EF 50-55%, global RV function mildly reduced with mild RV dilation. PTA on Metoprolol back on it   -PTA  Spironolactone, Torsemide 80 mg daily, Kcl all on hold, monitor .   - staring to have some leg edema , so  restarting   torsemide at 40mg ( on 80 at home )  for now and adjust as needed and montior renal functions , also stared Kcl at 20, takes 40 bid at home   - Hold spironolactone for now,   - Echo  10/25:  EF 55-60%       Paroxysmal A fib  Long term anticoagulation   -CHADSVASC 5. PTA on Coumadin and Metoprolol.   - Coumadin per pharmacy  - Continue metoprolol with hold parameters      Elevated troponin  - trop flat  - no symptoms   - EKG atrial fibrillation  no acute st t wave changes      Chronic hypercapnic respiratory failure   THONG:   -Continue CPAP   - no current issues      AGMA:  - resolved      MICHELLE on CKD III:   -baseline Cr around 1. Elevated to 1.77 10/25, BUN 23.1. Likely prerenal in the setting, on diuretics  of poor oral intake.   - received IV fluids  and now creatinine close to baseline   - slowly restarting diuretics , monitor renal functions   -  UA negative for infection  - CK 75   - avoid nephrotoxic agents      Constipation  - states has not had BM since admitted to TCU, did have some watery stool Thursday suspect due to overflow diarrhea   - abdominal XR  does show moderate  to large  colonic stool burden , needs aggressive bowel regime, enemas , laxatives  , discussed with  patient       T2DM with neuropathy :   - ling standing left lateral thigh numbness  -A1c 6.2 10/7. PTA on Metformin only.   - restarted  Metformin  500 mg bid 10/28, accu checks bid     HLD:  - Continue PTA ASA and statin     Depression, anxiety :   No PTA meds.  -feels very depressed, also feels anxious   - will ask psych to see on Monday      Chronic microcytic anemia:   -  "baseline  hgb 10.5-12 and stable.   - Iron low to 14 9/22/2024.   -PTA on iron, continue, was started on last admssion  - out patient  GI work up       Hypothyroidism:   TSH 0.93 10/7/24. Continue synthroid      Osteopenia on XR  - defer back to PMD   - vit d levels were nl last admission     BPH:   Continue Flomax     Chronic wounds:   Per notes, 2/2 DM. WONC consulted     Peridontal disease  -  during last admission was seen by dentistry,  on evaluation \"possible root tip maxillary left second molar (#15). Dentistry recs:- start chlorhexidine oral rinse BID for 4-8 weeks  - follow up with hospital dental clinic in [facility] for further evaluation.\"     MDD  CHRIS  - not on medications PTA, see above    Home medication reconciliation.  -continue current home medications          Diet: Combination Diet Regular Diet    DVT Prophylaxis: Warfarin  Bazzi Catheter: Not present  Lines: None     Cardiac Monitoring: None  Code Status: No CPR- Do NOT Intubate      Clinically Significant Risk Factors Present on Admission                # Drug Induced Coagulation Defect: home medication list includes an anticoagulant medication  # Drug Induced Platelet Defect: home medication list includes an antiplatelet medication   # Hypertension: Noted on problem list  # Chronic heart failure with preserved ejection fraction: heart failure noted on problem list and last echo with EF >50%        # Severe Obesity: Estimated body mass index is 47.66 kg/m  as calculated from the following:    Height as of 10/25/24: 1.829 m (6').    Weight as of 10/28/24: 159.4 kg (351 lb 6.6 oz).       # Financial/Environmental Concerns:           Disposition Plan     Medically Ready for Discharge: Ready Now             Damien Hudson MD  Hospitalist Service, GOLD TEAM [address] [place] [address]  Securely message with [employer] (more info)  Text page via [facility] Paging/Directory   See signed in provider for up to date " coverage information  ______________________________________________________________________    Interval History   No changes, feels stable    Physical Exam   Vital Signs: Temp: 97.8  F (36.6  C) Temp src: Oral BP: 117/50 Pulse: 93   Resp: 16   O2 Device: None (Room air)    Weight: 0 lbs 0 oz    No exam, senior living care    Medical Decision Making       20 MINUTES SPENT BY ME on the date of service doing chart review, history, exam, documentation & further activities per the note.      Data     I have personally reviewed the following data over the past 24 hrs:    INR:  1.72 (H) PTT:  N/A   D-dimer:  N/A Fibrinogen:  N/A

## 2024-10-31 NOTE — TELEPHONE ENCOUNTER
left patient a voicemail to connect about paperwork questions he had.    After leaving voicemail, SW noted that patient is currently in the hospital. SW will attempt to connect at a later time.    YAAKOV Hernandez

## 2024-10-31 NOTE — PROGRESS NOTES
ANTICOAGULATION  MANAGEMENT: Discharge Review    Clarke Moreira chart reviewed for anticoagulation continuity of care    Hospital Admission on 10/25 - 30/24 for mechanical FALL walking to the kitchen.  His knees buckled on him.   - lower back pain and bilateral knee / hip pains   - MICHELLE and inability to care for himself      Discharge disposition: Home    Results:    Recent labs: (last 7 days)     10/25/24  0454 10/25/24  1012 10/26/24  0752 10/27/24  0706 10/28/24  0612 10/29/24  0526 10/30/24  0638 10/31/24  0840   INR 3.59* 3.48* 3.40* 2.52* 2.06* 1.73* 1.67* 1.72*     Anticoagulation inpatient management:     anticoagulation calendar updated with inpatient dosing    Anticoagulation discharge instructions:     Warfarin dosing: home regimen continued   Bridging: No   INR goal change: No      Medication changes affecting anticoagulation: No    Additional factors affecting anticoagulation: No     PLAN     No adjustment to anticoagulation plan needed    Patient not contacted - patient was readmitted on 10/30/24 after discharge. Being transitioned to [facility] care on 10/30/2024. Inability to care for himself.   oonsulting for assistance.    No adjustment to Anticoagulation Calendar was required    Mirna Mcknight RN  10/31/2024  Anticoagulation Clinic  [facility] for routing messages: [facility]  [facility] patient phone line: [phone]

## 2024-10-31 NOTE — PLAN OF CARE
Goal Outcome Evaluation:      Plan of Care Reviewed With: patient    Overall Patient Progress: no change     Outcome Evaluation: Pt is calm, cooperative, able to make his needs known, call light within reach, and uses it appropriately.    VS: /50 (BP Location: Right arm)   Pulse 89   Temp 97.8  F (36.6  C) (Oral)   Resp 18    O2: Room Air   Output: Continent x 2 - urinal at bedside   Last BM: 10/30/24   Activity: Assist x 2 w/ Jesika Steady   Up for meals? Good; encourage upright positioning   Skin: Scattered bruising (bilateral knees, left upper arm, bilateral forearm); Scab on left knee/upper shin; mild redness bilateral lower extremities; +1 swelling in ankles & +2 swelling in feet   Pain: Denies pain   CMS: A&O x 4; Unstable gait & generalized weakness BLE; Denies numbness/tingling/tenderness   Dressing: Lymphedema sleeves bilateral lower extremities   Diet: Regular/Thin/Pills Whole   LDA: PIV right posterior forearm   Plan: Encourage repositioning; Continue POC   Additional Info:

## 2024-10-31 NOTE — CONSULTS
Care Management Initial Consult    Chart was flipped to penitentiary status.  This note serves as re-documentation of original CMA note from pt's ongoing admission.  See note filed by Tyler Daniel RNCC on 10/26/2024 for original CMA note, as it contains additional context/details that came from Tyler's conversation w/ pt.    General Information  Assessment completed with: VM-chart review,    Type of CM/SW Visit: Other (Re-documentation of CMA d/t switch to penitentiary care status)    Primary Care Provider verified and updated as needed: Yes   Readmission within the last 30 days: previous discharge plan unsuccessful      Reason for Consult: discharge planning  Advance Care Planning: Advance Care Planning Reviewed: no concerns identified          Communication Assessment  Patient's communication style: spoken language (English or Bilingual)    Hearing Difficulty or Deaf: no   Wear Glasses or Blind: yes    Cognitive  Cognitive/Neuro/Behavioral: WDL, all  Level of Consciousness: alert  Arousal Level: opens eyes spontaneously  Orientation: oriented x 4  Mood/Behavior: behavior appropriate to situation, calm, cooperative  Best Language: 0 - No aphasia  Speech: clear, logical, spontaneous    Living Environment:   People in home: alone     Current living Arrangements: apartment      Able to return to prior arrangements: no (requires TCU)       Family/Social Support:  Care provided by: self  Provides care for: no one, unable/limited ability to care for self  Marital Status: Single  Support system: Friend (has 3 friends but very limited contact)          Description of Support System: Uninvolved    Support Assessment: Lacks adequate physical care, Lacks adequate emotional support, Limited social contact and support, Difficulty establishing and maintaining relationships    Current Resources:   Patient receiving home care services: No        Community Resources: Meals on Wheels, Transportation Services, Volunteer ([employer], [employer] [place], [employer])  Equipment currently used at home: walker, standard, cane, straight, dressing device, hospital bed  Supplies currently used at home: Compression Stockings, Wound Care Supplies    Employment/Financial:  Employment Status: retired        Financial Concerns: insurance inadequate           Does the patient's insurance plan have a 3 day qualifying hospital stay waiver?  Yes     Which insurance plan 3 day waiver is available? Alternative insurance waiver    Will the waiver be used for post-acute placement? Yes    Lifestyle & Psychosocial Needs:  Social Drivers of Health     Food Insecurity: Low Risk  (10/30/2024)    Food Insecurity     Within the past 12 months, did you worry that your food would run out before you got money to buy more?: No     Within the past 12 months, did the food you bought just not last and you didn t have money to get more?: No   Depression: Not at risk (7/11/2024)    PHQ-2     PHQ-2 Score: 0   Housing Stability: Low Risk  (10/30/2024)    Housing Stability     Do you have housing? : Yes     Are you worried about losing your housing?: No   Recent Concern: Housing Stability - High Risk (9/25/2024)    Housing Stability     Do you have housing? : No     Are you worried about losing your housing?: No   Tobacco Use: Low Risk  (7/31/2024)    Patient History     Smoking Tobacco Use: Never     Smokeless Tobacco Use: Never     Passive Exposure: Not on file   Financial Resource Strain: Low Risk  (10/30/2024)    Financial Resource Strain     Within the past 12 months, have you or your family members you live with been unable to get utilities (heat, electricity) when it was really needed?: No   Alcohol Use: Not on file   Transportation Needs: Low Risk  (10/30/2024)    Transportation Needs     Within the past 12 months, has lack of transportation kept you from medical appointments, getting your medicines, non-medical meetings or appointments, work, or from getting  things that you need?: No   Physical Activity: Not on file   Interpersonal Safety: Low Risk  (10/30/2024)    Interpersonal Safety     Do you feel physically and emotionally safe where you currently live?: Yes     Within the past 12 months, have you been hit, slapped, kicked or otherwise physically hurt by someone?: No     Within the past 12 months, have you been humiliated or emotionally abused in other ways by your partner or ex-partner?: No   Stress: Not on file   Social Connections: Not on file   Health Literacy: Not on file       Functional Status:  Prior to admission patient needed assistance:   Dependent ADLs:: Ambulation-walker  Dependent IADLs:: Cleaning, Meal Preparation, Transportation       Mental Health Status:  Mental Health Status: Current Concern  Mental Health Management: Individual Therapy    Chemical Dependency Status:  Chemical Dependency Status: No Current Concerns             Values/Beliefs:  Spiritual, Cultural Beliefs, Rastafarian Practices, Values that affect care: no               Discussed  Partnership in Safe Discharge Planning  document with patient/family: Yes: (See note from Tyler MITCHELL on 10/26/2024)    Additional Information:    Called [facility] assessor, Any Kate, at [phone].  Called twice.  Both times, call went unanswered, went to .  Unfortunately, the  greeting sounded as if the call connection was poor on both attempts at calling (call constantly cutting out, staticy recorded message).  Did not leave  as it was unclear whose VM box this was or if/when VM recording was enabled.    Called Any for a third time later this afternoon, the earlier technical issues were resolved.  Left  on confidential VM box inquiring about when OBRA II assessment will be completed, provided direct call back number.    3:37pm - Received call from Any, she stated that she can complete assessment tomorrow AM or early afternoon.  Once assessment is completed, she will fax it to  "the TCU, as well as notify [facility] care mgmt.  She reported that she is able to do this assessment remotely for this pt, given he is already in their system.    Sent message via [employer] Destinations to [facility], updated them to OBRA II assessment being scheduled for tomorrow.    Addendum:  Received request from [facility] to provide name/contact info for OBRA II , as they have been having issues w/ fax and want to reach out to provide an alternative fax number.  Provided Any's contact info.      Next Steps:  - Get result of OBRA II assessment    - Proceed w/ TCU discharge once able        PAULA Grimes  [facility]  Units 8M/S & 10 ICU  Reachable on [employer] - Search by name or search \"RNCC\"  Phone: [phone]    "

## 2024-10-31 NOTE — PLAN OF CARE
"Goal Outcome Evaluation:      Plan of Care Reviewed With: other (see comments) (Interdisciplinary Team)    Outcome Evaluation: Anticipate discharge to TCU following completion of OBRA II assessment.  See care mgmt note(s) for details.        Nii Madrid RNCC  [facility]  Units 8M/S & 10 ICU  Reach[employer] on [employer] - Search by name or search \"RNCC\"  Phone: [phone]    "

## 2024-11-01 LAB
GLUCOSE BLDC GLUCOMTR-MCNC: 116 MG/DL (ref 70–99)
GLUCOSE BLDC GLUCOMTR-MCNC: 138 MG/DL (ref 70–99)
GLUCOSE BLDC GLUCOMTR-MCNC: 98 MG/DL (ref 70–99)
HOLD SPECIMEN: NORMAL
HOLD SPECIMEN: NORMAL
INR PPP: 1.79 (ref 0.85–1.15)

## 2024-11-01 PROCEDURE — 85610 PROTHROMBIN TIME: CPT | Performed by: STUDENT IN AN ORGANIZED HEALTH CARE EDUCATION/TRAINING PROGRAM

## 2024-11-01 PROCEDURE — 36415 COLL VENOUS BLD VENIPUNCTURE: CPT | Performed by: STUDENT IN AN ORGANIZED HEALTH CARE EDUCATION/TRAINING PROGRAM

## 2024-11-01 PROCEDURE — 250N000013 HC RX MED GY IP 250 OP 250 PS 637: Performed by: STUDENT IN AN ORGANIZED HEALTH CARE EDUCATION/TRAINING PROGRAM

## 2024-11-01 PROCEDURE — 101N000002 HC CUSTODIAL CARE DAILY

## 2024-11-01 PROCEDURE — 82962 GLUCOSE BLOOD TEST: CPT

## 2024-11-01 RX ORDER — SPIRONOLACTONE 50 MG/1
50 TABLET, FILM COATED ORAL DAILY
DISCHARGE
Start: 2024-11-01

## 2024-11-01 RX ORDER — SPIRONOLACTONE 25 MG/1
50 TABLET ORAL DAILY
Status: DISCONTINUED | OUTPATIENT
Start: 2024-11-01 | End: 2024-11-02 | Stop reason: HOSPADM

## 2024-11-01 RX ORDER — METOPROLOL SUCCINATE 100 MG/1
100 TABLET, EXTENDED RELEASE ORAL DAILY
DISCHARGE
Start: 2024-11-01 | End: 2025-03-12

## 2024-11-01 RX ORDER — POTASSIUM CHLORIDE 1500 MG/1
20 TABLET, EXTENDED RELEASE ORAL 2 TIMES DAILY
DISCHARGE
Start: 2024-11-01 | End: 2024-11-11

## 2024-11-01 RX ORDER — TORSEMIDE 20 MG/1
40 TABLET ORAL 2 TIMES DAILY
Status: DISCONTINUED | OUTPATIENT
Start: 2024-11-01 | End: 2024-11-02 | Stop reason: HOSPADM

## 2024-11-01 RX ORDER — SALIVA STIMULANT COMB. NO.3
1 SPRAY, NON-AEROSOL (ML) MUCOUS MEMBRANE 4 TIMES DAILY PRN
Status: ON HOLD | DISCHARGE
Start: 2024-11-01 | End: 2024-12-08

## 2024-11-01 RX ORDER — ACETAMINOPHEN 325 MG/1
650 TABLET ORAL EVERY 4 HOURS PRN
DISCHARGE
Start: 2024-11-01 | End: 2025-03-26

## 2024-11-01 RX ORDER — UREA 1 ML/10ML
LOTION TOPICAL 2 TIMES DAILY PRN
DISCHARGE
Start: 2024-11-01

## 2024-11-01 RX ORDER — CHLORHEXIDINE GLUCONATE ORAL RINSE 1.2 MG/ML
15 SOLUTION DENTAL 2 TIMES DAILY
Status: ON HOLD | DISCHARGE
Start: 2024-11-01 | End: 2024-12-08

## 2024-11-01 RX ADMIN — POTASSIUM CHLORIDE 20 MEQ: 750 TABLET, EXTENDED RELEASE ORAL at 08:14

## 2024-11-01 RX ADMIN — Medication 1 TABLET: at 08:14

## 2024-11-01 RX ADMIN — CHLORHEXIDINE GLUCONATE 0.12% ORAL RINSE 15 ML: 1.2 LIQUID ORAL at 19:55

## 2024-11-01 RX ADMIN — TORSEMIDE 40 MG: 20 TABLET ORAL at 19:55

## 2024-11-01 RX ADMIN — WARFARIN SODIUM 12.5 MG: 10 TABLET ORAL at 17:31

## 2024-11-01 RX ADMIN — Medication 25 MCG: at 08:14

## 2024-11-01 RX ADMIN — OXYCODONE HYDROCHLORIDE AND ACETAMINOPHEN 1000 MG: 500 TABLET ORAL at 08:14

## 2024-11-01 RX ADMIN — TAMSULOSIN HYDROCHLORIDE 0.4 MG: 0.4 CAPSULE ORAL at 08:14

## 2024-11-01 RX ADMIN — ATORVASTATIN CALCIUM 40 MG: 40 TABLET, FILM COATED ORAL at 08:14

## 2024-11-01 RX ADMIN — SENNOSIDES 1 TABLET: 8.6 TABLET, FILM COATED ORAL at 19:55

## 2024-11-01 RX ADMIN — SENNOSIDES 1 TABLET: 8.6 TABLET, FILM COATED ORAL at 08:14

## 2024-11-01 RX ADMIN — LEVOTHYROXINE SODIUM 175 MCG: 175 TABLET ORAL at 08:14

## 2024-11-01 RX ADMIN — METOPROLOL SUCCINATE 100 MG: 100 TABLET, EXTENDED RELEASE ORAL at 08:14

## 2024-11-01 RX ADMIN — CHLORHEXIDINE GLUCONATE 0.12% ORAL RINSE 15 ML: 1.2 LIQUID ORAL at 08:14

## 2024-11-01 RX ADMIN — METFORMIN HYDROCHLORIDE 500 MG: 500 TABLET, FILM COATED ORAL at 17:31

## 2024-11-01 RX ADMIN — METFORMIN HYDROCHLORIDE 500 MG: 500 TABLET, FILM COATED ORAL at 08:14

## 2024-11-01 RX ADMIN — ASPIRIN 81 MG CHEWABLE TABLET 81 MG: 81 TABLET CHEWABLE at 08:14

## 2024-11-01 RX ADMIN — TORSEMIDE 40 MG: 20 TABLET ORAL at 11:38

## 2024-11-01 NOTE — PROGRESS NOTES
[id number]     Goal Outcome Evaluation:     Patient alert and oriented. Vitals  sign stable on room air. Assist x2 with sera steady, not out of bed this shift. BLE have edema wear CDI. Right PIV, saline locked. Tolerating regular diet. Able to make needs known. Call light within reach. Continue with current plan of care.

## 2024-11-01 NOTE — PLAN OF CARE
Goal Outcome Evaluation:                        VS: Temp: 98  F (36.7  C) Temp src: Oral BP: 130/68 Pulse: 85   Resp: 15 SpO2: 94 % O2 Device: None (Room air)      O2: Stable on RA    Output: Voids without difficulty in urinal per pt report    Last BM: 10/30   Activity: Not OOB   A1-2 in marisa steady when OOB per report    Skin: Bruises to upper FA   Scabs and edema to bilateral legs   Wears compression stockings    Pain: Minimal pain. Decline pain meds    Neuro: Alert and oriened x4  Baseline numbness and tingling to BLEs    Dressing: None    Diet: Regular    LDA: PIV to R FA-SL    Equipment: Personal belongings at bedside   Plan: Per care coordinator's note - Anticipate discharge to TCU following completion of OBRA II assessment    Additional Info:

## 2024-11-01 NOTE — PLAN OF CARE
Goal Outcome Evaluation:    VS: /73 (BP Location: Right arm)   Pulse 98   Temp 98  F (36.7  C) (Oral)   Resp 16   SpO2 96%      O2: Stable on room air >90%   Output: Voids without difficulties, uses bedside urinal   Last BM: 11/1/24   Activity: Assist x2   Up for meals? Yes   Skin: BLE cellulitis   Pain: Reports pain @ 3/10   CMS: AO X4   Dressing: CDI to BLE   Diet: Regular diet   LDA: Rt PIV SL   Equipment: Bedside marisa mixon    Plan: Continue with POC   Additional Info: Wound care done per POC  Possible discharge to TCU 11/2/24 between [id number]

## 2024-11-01 NOTE — PROGRESS NOTES
Care Management Follow Up    Length of Stay (days): 0    Expected Discharge Date:  11/2/24     Concerns to be Addressed: discharge planning     Patient plan of care discussed at interdisciplinary rounds: Yes    Anticipated Discharge Disposition: Transitional Care     [facility]  [address] [place] [address]  P: [phone]  Adm: [phone]  F: [phone]    [facility]   P: [phone]  Wheelchair or Stretcher: Stretcher  Ride Time: 9:29 AM - 10:24 AM    Anticipated Discharge Services:  (TCU services)  Anticipated Discharge DME: Walker    Patient/family educated on Medicare website which has current facility and service quality ratings:    Education Provided on the Discharge Plan: Yes  Patient/Family in Agreement with the Plan: yes    Referrals Placed by CM/SW:    Private pay costs discussed: Not applicable    Discussed  Partnership in Safe Discharge Planning  document with patient/family: No     Handoff Completed: No, handoff not indicated or clinically appropriate    Additional Information:  Writer spoke with [facility] that they received the results of this pt OBRA level 2 screen. Pt is able to [facility] to [facility]. Writer called to schedule a stretcher ride due to pt bariatric need. No rides available until after 8 PM today. Admissions asked for pt to be at facility before 6:30 PM tonight. Writer scheduled a stretcher ride for tomorrow with a pickup window of 9:39 AM - 10:24 AM. Writer was only able to schedule the ride and not complete the PCS due to a number of other discharge needs that came up. Writer met with pt to confirm discharge plan. Writer explained to pt that he cannot appeal his discharge plan due to pt being under halfway care. Pt told social work he had no further response, writer thanked pt for his cooperation with the discharge plan. [facility] is aware that this pt is arriving tomorrow. Discharge orders can be sent  via epic to [facility] or the [facility].     Next Steps: complete PCS form send discharge orders      YAAKOV Garcia   [facility]   Ph: [phone]  [facility]  Care Management Department    Securely message me on [employer]

## 2024-11-01 NOTE — PROGRESS NOTES
Patient wears cpap at home. Patient does not have home cpap with him and dose not want a hospital provided cpap.

## 2024-11-01 NOTE — PROGRESS NOTES
"[facility]    Medicine Progress Note - Hospitalist Service, GOLD TEAM 21    Date of Admission:  10/30/2024    Assessment & Plan   Clarke Moreira is a 74 year old male being transitioned to skilled nursing care on 10/30/2024. Please see the encounters from the same date for more details of his presentation and workup.    longterm Care .  -social work consulting for assistance  Clarke Moreira is a 74 year old male with history of DMII, CKD, HFpEF, A fib, depression, THONG, chronic anemia, hypothyroidism, HLD, and BPH who was admitted to [facility] 10/25/2024 with mechanical fall, MICHELLE, and inability to care for himself. PTA fell walking to the kitchen due to knees buckling. No LOC, did not hit head.  Of note, recently admitted to [facility] 9/22 to 10/3 with similar presentation and was found to have citrobacter bacteremia. Recently discharged from TCU but struggled to care for self, perform ADLs, and limited PO intake since discharge.      Depression:  - seen by psych, no acute inpatient needs or precautions. No med changes     Mechanical fall  Lower back pain    Bilateral knee  hip pain   -Head CT and cervical/thoracic/lumbar spine CT no acute findings.   -XR R knee and pelvis no acute findings. I told patient  they did right sided XR as when he came in he complained of pain on right side , he told me \" well that is what you been told but that is not what I said\"   - complains of  left hip pain post fall,  XR  shows no fracture , discussed with  patient    - UA negative for signs of infection   - BCx x1 10/25, no growth to date   - PT, OT, nutrition consults   - Pain management with Tylenol and Lidoderm patches     Hypotension:   -BP 80s-110s/50s-70s in the ED, responded to IV fluids    - Encourage oral intake  - Infectious workup   - blood pressure  now good       Failure to thrive  - nutritionist to saw  - physical therapy  Occupational therapy    -     "   Hypertension    -blood pressure  improved  so metoprolol  resumed with holding parameters      HFpEF:   -Last echo 3/2021 technically difficult, EF 50-55%, global RV function mildly reduced with mild RV dilation. PTA on Metoprolol back on it   -PTA  Spironolactone, Torsemide 80 mg daily, Kcl all restarted.   - Echo  10/25:  EF 55-60%       Paroxysmal A fib  Long term anticoagulation   -CHADSVASC 5. PTA on Coumadin and Metoprolol.   - Coumadin per pharmacy  - Continue metoprolol with hold parameters      Elevated troponin  - trop flat  - no symptoms   - EKG atrial fibrillation  no acute st t wave changes      Chronic hypercapnic respiratory failure   THONG:   -Continue CPAP   - no current issues      AGMA:  - resolved      MICHELLE on CKD III:   -baseline Cr around 1. Elevated to 1.77 10/25, BUN 23.1. Likely prerenal in the setting, on diuretics  of poor oral intake.   - received IV fluids  and now creatinine close to baseline   - slowly restarting diuretics , monitor renal functions   -  UA negative for infection  - CK 75   - avoid nephrotoxic agents      Constipation  - states has not had BM since admitted to TCU, did have some watery stool Thursday suspect due to overflow diarrhea   - abdominal XR  does show moderate  to large  colonic stool burden , needs aggressive bowel regime, enemas , laxatives  , discussed with  patient       T2DM with neuropathy :   - ling standing left lateral thigh numbness  -A1c 6.2 10/7. PTA on Metformin only.   - restarted  Metformin  500 mg bid 10/28, accu checks bid     HLD:  - Continue PTA ASA and statin     Depression, anxiety :   No PTA meds.  -feels very depressed, also feels anxious   - will ask psych to see on Monday      Chronic microcytic anemia:   - baseline  hgb 10.5-12 and stable.   - Iron low to 14 9/22/2024.   -PTA on iron, continue, was started on last admssion  - out patient  GI work up       Hypothyroidism:   TSH 0.93 10/7/24. Continue synthroid      Osteopenia on XR  -  "defer back to PMD   - vit d levels were nl last admission     BPH:   Continue Flomax     Chronic wounds:   Per notes, 2/2 DM. [facility]ONC consulted     Peridontal disease  -  during last admission was seen by dentistry,  on evaluation \"possible root tip maxillary left second molar (#15). Dentistry recs:- start chlorhexidine oral rinse BID for 4-8 weeks  - follow up with hospital dental clinic in [facility] for further evaluation.\"     MDD  CHRIS  - not on medications PTA, see above    Home medication reconciliation.  -continue current home medications          Diet: Combination Diet Regular Diet  Diet    DVT Prophylaxis: Warfarin  Bazzi Catheter: Not present  Lines: None     Cardiac Monitoring: None  Code Status: No CPR- Do NOT Intubate      Clinically Significant Risk Factors Present on Admission                # Drug Induced Coagulation Defect: home medication list includes an anticoagulant medication  # Drug Induced Platelet Defect: home medication list includes an antiplatelet medication   # Hypertension: Noted on problem list  # Chronic heart failure with preserved ejection fraction: heart failure noted on problem list and last echo with EF >50%        # Severe Obesity: Estimated body mass index is 47.66 kg/m  as calculated from the following:    Height as of 10/25/24: 1.829 m (6').    Weight as of 10/28/24: 159.4 kg (351 lb 6.6 oz).       # Financial/Environmental Concerns: insurance inadequate         Disposition Plan     Medically Ready for Discharge: Ready Now             Damien Hudson MD  Hospitalist Service, GOLD TEAM 21  [facility]  Securely message with Mobile [employer] (more info)  Text page via [facility] Paging/Directory   See signed in provider for up to date coverage information  ______________________________________________________________________    Interval History   Doing well no concerns.    Physical Exam   Vital Signs: Temp: 98  F (36.7  C) Temp src: Oral BP: " 124/73 Pulse: 98   Resp: 16 SpO2: 96 % O2 Device: None (Room air)    Weight: 0 lbs 0 oz    Deferred with [facility] care    Medical Decision Making       20 MINUTES SPENT BY ME on the date of service doing chart review, history, exam, documentation & further activities per the note.      Data     I have personally reviewed the following data over the past 24 hrs:    INR:  1.79 (H) PTT:  N/A   D-dimer:  N/A Fibrinogen:  N/A       Imaging results reviewed over the past 24 hrs:   No results found for this or any previous visit (from the past 24 hours).

## 2024-11-02 VITALS
SYSTOLIC BLOOD PRESSURE: 119 MMHG | HEART RATE: 88 BPM | DIASTOLIC BLOOD PRESSURE: 69 MMHG | TEMPERATURE: 97.9 F | RESPIRATION RATE: 18 BRPM | OXYGEN SATURATION: 98 %

## 2024-11-02 LAB
GLUCOSE BLDC GLUCOMTR-MCNC: 135 MG/DL (ref 70–99)
HOLD SPECIMEN: NORMAL
HOLD SPECIMEN: NORMAL
INR PPP: 1.96 (ref 0.85–1.15)

## 2024-11-02 PROCEDURE — 82962 GLUCOSE BLOOD TEST: CPT

## 2024-11-02 PROCEDURE — 99238 HOSP IP/OBS DSCHRG MGMT 30/<: CPT | Performed by: STUDENT IN AN ORGANIZED HEALTH CARE EDUCATION/TRAINING PROGRAM

## 2024-11-02 PROCEDURE — 36415 COLL VENOUS BLD VENIPUNCTURE: CPT | Performed by: STUDENT IN AN ORGANIZED HEALTH CARE EDUCATION/TRAINING PROGRAM

## 2024-11-02 PROCEDURE — 250N000013 HC RX MED GY IP 250 OP 250 PS 637: Performed by: STUDENT IN AN ORGANIZED HEALTH CARE EDUCATION/TRAINING PROGRAM

## 2024-11-02 PROCEDURE — 85610 PROTHROMBIN TIME: CPT | Performed by: STUDENT IN AN ORGANIZED HEALTH CARE EDUCATION/TRAINING PROGRAM

## 2024-11-02 RX ADMIN — CHLORHEXIDINE GLUCONATE 0.12% ORAL RINSE 15 ML: 1.2 LIQUID ORAL at 07:56

## 2024-11-02 RX ADMIN — LEVOTHYROXINE SODIUM 175 MCG: 175 TABLET ORAL at 07:56

## 2024-11-02 RX ADMIN — ATORVASTATIN CALCIUM 40 MG: 40 TABLET, FILM COATED ORAL at 07:56

## 2024-11-02 RX ADMIN — Medication 25 MCG: at 07:56

## 2024-11-02 RX ADMIN — SENNOSIDES 1 TABLET: 8.6 TABLET, FILM COATED ORAL at 07:56

## 2024-11-02 RX ADMIN — TAMSULOSIN HYDROCHLORIDE 0.4 MG: 0.4 CAPSULE ORAL at 07:56

## 2024-11-02 RX ADMIN — TORSEMIDE 40 MG: 20 TABLET ORAL at 07:56

## 2024-11-02 RX ADMIN — ASPIRIN 81 MG CHEWABLE TABLET 81 MG: 81 TABLET CHEWABLE at 07:56

## 2024-11-02 RX ADMIN — Medication 1 TABLET: at 07:56

## 2024-11-02 RX ADMIN — METOPROLOL SUCCINATE 100 MG: 100 TABLET, EXTENDED RELEASE ORAL at 07:56

## 2024-11-02 RX ADMIN — POTASSIUM CHLORIDE 20 MEQ: 750 TABLET, EXTENDED RELEASE ORAL at 07:56

## 2024-11-02 RX ADMIN — OXYCODONE HYDROCHLORIDE AND ACETAMINOPHEN 1000 MG: 500 TABLET ORAL at 07:56

## 2024-11-02 RX ADMIN — METFORMIN HYDROCHLORIDE 500 MG: 500 TABLET, FILM COATED ORAL at 07:56

## 2024-11-02 ASSESSMENT — ACTIVITIES OF DAILY LIVING (ADL)
ADLS_ACUITY_SCORE: 0

## 2024-11-02 NOTE — PLAN OF CARE
Goal Outcome Evaluation:    VS: /69 (BP Location: Right arm)   Pulse 88   Temp 97.9  F (36.6  C) (Oral)   Resp 18   SpO2 98%      O2: Stable on room air >90%   Output: Voids without difficulties   Last BM: 11/1/24   Activity: Assist x2   Up for meals? Yes   Skin: BLE cellulitis   Pain: Reports pain at 4/10   CMS: AO x4   Dressing: CDI to BLE   Diet: Regular   LDA: None   Equipment: Walker   Plan: Discharging to TCU   Additional Info:       8MS DISCHARGE    D: Patient discharged to TCU at 1100. Patient accompanied by EMS staff.    I: All discharge medications and instructions reviewed with patient and would also be seen at [facility] 11/6. Patient instructed to seek care if experiencing worsening symptoms, such as generalized weakness. Other phone numbers to call with questions or concerns after discharge reviewed. Rt PIV removed. Education completed.    A: Pt verbalized understanding of discharge medications and instructions. Belongings returned to patient.    P: Patient to follow-up on 11/06 with [facility].

## 2024-11-02 NOTE — DISCHARGE SUMMARY
[facility]  Hospitalist Discharge Summary      Date of Admission:  10/30/2024  Date of Discharge:  11/2/2024  Discharging Provider: Damien Hudson MD  Discharge Service: Hospitalist Service, GOLD TEAM 21    Discharge Diagnoses   Mechanical fall  Lower back pain    Bilateral knee  hip pain   FCI care  FTT  pAF  MICHELLE on CKD  DM2  Microcytic anemia    Clinically Significant Risk Factors     # Severe Obesity: Estimated body mass index is 47.66 kg/m  as calculated from the following:    Height as of 10/25/24: 1.829 m (6').    Weight as of 10/28/24: 159.4 kg (351 lb 6.6 oz).       Follow-ups Needed After Discharge   Follow-up Appointments       Follow Up and recommended labs and tests      Follow up with Nursing home physician.  You should restart your home spironolactone in the next few days if your kidney function remains stable and you have ongoing edema in your legs.                Unresulted Labs Ordered in the Past 30 Days of this Admission       Date and Time Order Name Status Description    11/2/2024 12:00 AM INR In process         These results will be followed up by na    Discharge Disposition   Discharged to rehabilitation facility  Condition at discharge: Stable    Hospital Course   Clarke Moreira is a 74 year old male being transitioned to half-way care on 10/30/2024. Please see the encounters from the same date for more details of his presentation and workup.     FCI Care .  -social work consulting for assistance  Clarke Moreira is a 74 year old male with history of DMII, CKD, HFpEF, A fib, depression, THONG, chronic anemia, hypothyroidism, HLD, and BPH who was admitted to [facility] 10/25/2024 with mechanical fall, MICHELLE, and inability to care for himself. PTA fell walking to the kitchen due to knees buckling. No LOC, did not hit head.  Of note, recently admitted to [facility] 9/22 to 10/3 with similar presentation and was found to have citrobacter  bacteremia. Recently discharged from TCU but struggled to care for self, perform ADLs, and limited PO intake since discharge. Please see H/P for full list of problems addressed during previous hospitalization.     Consultations This Hospital Stay   PHARMACY TO DOSE WARFARIN  NUTRITION SERVICES ADULT IP CONSULT  CARE MANAGEMENT / SOCIAL WORK IP CONSULT  PHYSICAL THERAPY ADULT IP CONSULT  OCCUPATIONAL THERAPY ADULT IP CONSULT    Code Status   No CPR- Do NOT Intubate    Time Spent on this Encounter   I, Damien Hudson MD, personally saw the patient today and spent less than or equal to 30 minutes discharging this patient.       Damien Hudson MD  [facility] [address]  [facility] [address]  Phone: [phone]  Fax: [phone]  ______________________________________________________________________    Physical Exam   Vital Signs: Temp: 97.9  F (36.6  C) Temp src: Oral BP: 119/69 Pulse: 88   Resp: 18 SpO2: 98 % O2 Device: None (Room air)    Weight: 0 lbs 0 oz  assisted care       Primary Care Physician   Henrry Shepard    Discharge Orders      General info for SNF    Length of Stay Estimate: Short Term Care: Estimated # of Days <30  Condition at Discharge: Improving  Level of care:skilled   Rehabilitation Potential: Excellent  Admission H&P remains valid and up-to-date: Yes  Recent Chemotherapy: N/A  Use Nursing Home Standing Orders: Yes     Mantoux instructions    Give two-step Mantoux (PPD) Per Facility Policy Yes     Follow Up and recommended labs and tests    Follow up with Nursing home physician.  You should restart your home spironolactone in the next few days if your kidney function remains stable and you have ongoing edema in your legs.     Reason for your hospital stay    Heart failure, mechanical fall     Activity - Up with nursing assistance     CPAP - Continue Home CPAP    Continue Home CPAP per home equipment settings.     Wound care (specify)    Upper extremities,  "bilateral knees and generalized body: Daily and PRN  After bathing, apply sween cream or body lotion to maintain skin integrity and provide moisture to dry skin    Bilateral lower leg  Monday/Wednesday/Friday  Moisten kerlex gauze rolls x2 with Vashe wound cleanser. Squeeze out excess moisture.  Wrap bilateral legs from toes to just below knees and soak for 5 minutes.  Remove wraps and discard.  Moisturize bilateral lower legs with Atractain/Urea moisturizer, see EMAR, DO NOT apply between toes.  Apply edema wear size large, see additional instructions below for edema wear use.    EdemaWear stockings: Use and Care    Rationale for use:    Decreases edema by improving lymphatic and venous function      Safe and gentle compression    Enhances wound healing    Protects skin    General:    EdemaWear can be worn 24/7, but should be removed at least daily for skin inspection and cares      In order to be effective, EdemaWear should DIRECTLY contact the skin as much as possible -- the mesh weave promotes lymphatic drainage when it is pressed into the skin    Choose appropriate size EdemaWear based on leg (or arm) circumference - see table for guidelines    EdemaWear LITE is only for especially fragile or painful skin    Cleanse and moisturize any intact or scaly skin before applying EdemaWear    Ok to apply additional compression over the EdemaWear (ie Lymph wraps)    Application:    Apply the EdemaWear from base of toes to knee, or above knee if tolerated and it is a long stocking    Create a wide 3\" cuff at the top if needed to prevent rolling down    Only trim the stocking if it is excessively long; do NOT cut in half; can often fold over excess length onto foot    When wounds are present:    Wound dressings that directly treat the wound bed can be applied under the EdemaWear    Any additional cover dressings (dry gauze, ABD pads, Kerlix, etc) should be applied ON TOP of the stockings whenever feasible    Stockings may " "get soiled with drainage and will need to be washed; ensure an extra clean, dry pair always available    May need two people to apply the stocking - bunch up stocking and pull against each other, lifting over wounds    Care:    When stockings are soiled, DO NOT THROW AWAY, hand wash with mild soap, rinse, hang dry    Replace approximately every 4 to 6 months         EdemaWear size Max circumference Stripe color PS # # stockings per pack  Small 18\"  (45cm) navy [id number] 2  Medium 30\"  (75cm) yellow [id number] 1  Large 46\"  (115cm) red 499666 1  X Large 60\"  (150cm) aqua [id number] 1  Small LITE 24\"  (60cm) purple 083630 2  Medium LITE 36\"  (90cm) orange 710698 1            Physical Therapy Adult Consult    Evaluate and treat as clinically indicated.    Reason:  deconditioning     Occupational Therapy Adult Consult    Evaluate and treat as clinically indicated.    Reason:  deconditioning     Oxygen (SNF/TCU) Discharge     Fall precautions     Diet    Follow this diet upon discharge: Current Diet:Orders Placed This Encounter      Combination Diet Regular Diet       Significant Results and Procedures   Most Recent 3 CBC's:  Recent Labs   Lab Test 11/05/24  0517 10/28/24  0612 10/27/24  0706   WBC 7.3 8.3 8.9   HGB 11.5* 11.5* 11.1*   MCV 76* 78 77*    279 278     Most Recent 3 BMP's:  Recent Labs   Lab Test 11/07/24  0525 11/05/24  0517 11/02/24  0744 11/01/24  1723 10/28/24  0645 10/28/24  0612 10/27/24  1046 10/27/24  0706   NA  --  139  --   --   --  136  --  135   POTASSIUM 3.0* 2.8*  --   --   --  3.8  --  3.6   CHLORIDE  --  95*  --   --   --  101  --  97*   CO2  --  32*  --   --   --  25  --  29   BUN  --  14.8  --   --   --  10.7  --  11.5   CR  --  0.97  --   --   --  0.90  --  1.06   ANIONGAP  --  12  --   --   --  10  --  9   CHERI  --  8.7*  --   --   --  9.5  --  9.0   GLC  --  117* 135* 138*   < > 125*   < > 129*    < > = values in this interval not displayed.     Most Recent 2 LFT's:  Recent Labs   Lab Test " 10/25/24  0454 09/23/24  0638   AST 27 45   ALT 12 17   ALKPHOS 119 97   BILITOTAL 0.7 0.7   ,   Results for orders placed or performed during the hospital encounter of 10/25/24   CT Head w/o Contrast    Narrative    EXAM: CT HEAD W/O CONTRAST, CT CERVICAL SPINE W/O CONTRAST  LOCATION: [facility]  DATE: 10/25/2024    INDICATION: Trauma on warfarin  COMPARISON: None.  TECHNIQUE:   1) Routine CT Head without IV contrast. Multiplanar reformats. Dose reduction techniques were used.  2) Routine CT Cervical Spine without IV contrast. Multiplanar reformats. Dose reduction techniques were used.    FINDINGS:   HEAD CT:   INTRACRANIAL CONTENTS: No intracranial hemorrhage, extraaxial collection, or mass effect.  No CT evidence of acute infarct. 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 Mild presumed chronic small vessel ischemic changes. Normal ventricles and   sulci.     VISUALIZED ORBITS/SINUSES/MASTOIDS: No intraorbital abnormality. No paranasal sinus mucosal disease. No middle ear or mastoid effusion.    BONES/SOFT TISSUES: No acute abnormality.    CERVICAL SPINE CT:   VERTEBRA: Normal vertebral body heights and alignment. No fracture or posttraumatic subluxation.     CANAL/FORAMINA: Multilevel spondylosis without high grade canal stenosis.    PARASPINAL: No extraspinal abnormality. Visualized lung fields are clear.      Impression    IMPRESSION:  HEAD CT:  1.  No acute intracranial process.    CERVICAL SPINE CT:  1.  No CT evidence for acute fracture or post traumatic subluxation.   CT Cervical Spine w/o Contrast    Narrative    EXAM: CT HEAD W/O CONTRAST, CT CERVICAL SPINE W/O CONTRAST  LOCATION: [facility]  DATE: 10/25/2024    INDICATION: Trauma on warfarin  COMPARISON: None.  TECHNIQUE:   1) Routine CT Head without IV contrast. Multiplanar reformats. Dose reduction techniques were used.  2) Routine CT Cervical Spine  without IV contrast. Multiplanar reformats. Dose reduction techniques were used.    FINDINGS:   HEAD CT:   INTRACRANIAL CONTENTS: No intracranial hemorrhage, extraaxial collection, or mass effect.  No CT evidence of acute infarct. 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 Mild presumed chronic small vessel ischemic changes. Normal ventricles and   sulci.     VISUALIZED ORBITS/SINUSES/MASTOIDS: No intraorbital abnormality. No paranasal sinus mucosal disease. No middle ear or mastoid effusion.    BONES/SOFT TISSUES: No acute abnormality.    CERVICAL SPINE CT:   VERTEBRA: Normal vertebral body heights and alignment. No fracture or posttraumatic subluxation.     CANAL/FORAMINA: Multilevel spondylosis without high grade canal stenosis.    PARASPINAL: No extraspinal abnormality. Visualized lung fields are clear.      Impression    IMPRESSION:  HEAD CT:  1.  No acute intracranial process.    CERVICAL SPINE CT:  1.  No CT evidence for acute fracture or post traumatic subluxation.   CT Thoracic Spine w/o Contrast    Narrative    EXAM: CT THORACIC SPINE W/O CONTRAST, CT LUMBAR SPINE W/O CONTRAST  LOCATION: [facility]  DATE/TIME: 10/25/2024 6:48 AM CDT    INDICATION: Trauma  COMPARISON: None.  TECHNIQUE:   1) Routine CT Thoracic Spine without IV contrast. Multiplanar reformats. Dose reduction techniques were used.   2) Routine CT Lumbar Spine without IV contrast. Multiplanar reformats. Dose reduction techniques were used.     FINDINGS:     VERTEBRA: Normal vertebral body heights and alignment. No acute compression fracture or posttraumatic subluxation. Flowing ventral osteophytes throughout the thoracic spine in keeping with diffuse idiopathic skeletal hyperostosis.    CANAL/FORAMINA: No significant canal or neural foraminal stenosis.    PARASPINAL: Cholelithiasis.      Impression    IMPRESSION:    1.  No fracture or posttraumatic subluxation.  2.  Cholelithiasis.   CT  Lumbar Spine w/o Contrast    Narrative    EXAM: CT THORACIC SPINE W/O CONTRAST, CT LUMBAR SPINE W/O CONTRAST  LOCATION: [facility]  DATE/TIME: 10/25/2024 6:48 AM CDT    INDICATION: Trauma  COMPARISON: None.  TECHNIQUE:   1) Routine CT Thoracic Spine without IV contrast. Multiplanar reformats. Dose reduction techniques were used.   2) Routine CT Lumbar Spine without IV contrast. Multiplanar reformats. Dose reduction techniques were used.     FINDINGS:     VERTEBRA: Normal vertebral body heights and alignment. No acute compression fracture or posttraumatic subluxation. Flowing ventral osteophytes throughout the thoracic spine in keeping with diffuse idiopathic skeletal hyperostosis.    CANAL/FORAMINA: No significant canal or neural foraminal stenosis.    PARASPINAL: Cholelithiasis.      Impression    IMPRESSION:    1.  No fracture or posttraumatic subluxation.  2.  Cholelithiasis.   XR Pelvis w Hip Right 1 View    Narrative    EXAM: XR PELVIS AND HIP RIGHT 1 VIEW, 10/25/2024 8:13 AM    History: Trauma    Comparison: CT AP 9/22/2024    Findings:  AP and right frog-leg lateral view(s) of the pelvis were obtained.  Cranial portion of the iliac crest are not seen within the  field-of-view.    No acute osseous abnormality.    Mild bilateral hip joint space loss with marginal acetabular  osteophytosis.     Sacrum and innominate bones are partially obscured by overlying bowel  gas/fecal content.    Pelvic phlebolith. Pelvic and trochanteric enthesopathy. Lower lumbar  degeneration.      Impression    Impression:  1. The iliac crests are not completely visualized. Otherwise, no acute  bony abnormality.  2. Mild to moderate right and mild left hip joint space narrowing.    I have personally reviewed the examination and initial interpretation  and I agree with the findings.    JERMAINE GARCIA MD (Joe)         SYSTEM ID:  [id number]   XR Knee Right 1/2 Views    Narrative    2  views right knee radiographs 10/25/2024 8:13 AM    History: Trauma    Additional History from EMR: Walking to the kitchen today with a  walker and felt knees buckle causing a fall.    Comparison: Bilateral knee x-ray 9/22/2024    Findings:  AP and lateral views of the right knee were obtained.     No acute bony abnormality  Fibular head irregularity, similar to  9/20/2024. No joint effusion.    Mild to moderate medial and mild lateral joint space narrowing. Tibial  spine sharpening.    Soft tissue is unremarkable.      Impression    Impression:  1. No acute bony abnormality. Similar irregular appearance of the  fibular head compared to 9/22/2024.  2. Mild to moderate degenerative changes of the knee.    I have personally reviewed the examination and initial interpretation  and I agree with the findings.    JERMAINE GARCIA MD (Joe)         SYSTEM ID:  [id number]   XR Hip Left 2-3 Views    Narrative    EXAM: XR HIP LEFT 2-3 VIEWS  LOCATION: [facility]  DATE: 10/26/2024    INDICATION: Status post fall, left hip pain.  COMPARISON: None.      Impression    IMPRESSION: Moderate osteoarthrosis of the right hip. The left hip appears normal. There is no evidence of fracture or osteonecrosis.   XR Abdomen 1 View    Narrative    EXAMINATION:  XR ABDOMEN 1 VIEW 10/26/2024 3:06 PM.    COMPARISON: CT 9/22/2024.    HISTORY:  Check stool burden    FINDINGS:   Moderate to large colonic stool burden. Nonobstructive bowel gas  pattern. No pneumatosis or portal venous gas. No acute osseous  abnormality.      Impression    IMPRESSION:   Moderate to large colonic stool burden. Nonobstructive bowel gas  pattern.    I have personally reviewed the examination and initial interpretation  and I agree with the findings.    ARLIN TYLER DO         SYSTEM ID:  [id number]   Echo Complete     Value    LVEF  55-60%    Narrative    [id number]  [id number]  [id number]  [id number]^STEPHANY^OCTAVIO^JESICA      [facility],[place]  Echocardiography Laboratory  [address] [place] [address]     Name: AJIT CALLAHAN  MRN: [id number]  : 1950  Study Date: 10/25/2024 10:48 AM  Age: 74 yrs  Gender: Male  Patient Location: [facility]  Reason For Study: Dyspnea  Ordering Physician: OCTAVIO HAMMOND  Performed By: Rachel Berry ELLEN     BSA: 2.7 m2  Height: 72 in  Weight: 343 lb  HR: 89  BP: 104/50 mmHg  ______________________________________________________________________________  Procedure  Complete Portable Echo Adult. Contrast Definity. Technically difficult  study.Extremely poor acoustic windows. Definity (NDC #46236-216-08) given  intravenously. Patient was given 6ml mixture of 1.5ml Definity and 8.5ml  saline. 4 ml wasted.  ______________________________________________________________________________  Interpretation Summary  Technically difficult study.Extremely poor acoustic windows.  Global and regional left ventricular function is normal with an EF of 55-60%.  Right ventricular function, chamber size, wall motion, and thickness are  normal.  Pulmonary artery systolic pressure is normal.  The inferior vena cava is normal.  No pericardial effusion is present.  ______________________________________________________________________________  Left Ventricle  Global and regional left ventricular function is normal with an EF of 55-60%.  Left ventricular wall thickness is normal. Left ventricular size is normal.  Diastolic function not assessed due to arrhythmia. No regional wall motion  abnormalities are seen.     Right Ventricle  Right ventricular function, chamber size, wall motion, and thickness are  normal.     Atria  The atria cannot be assessed.     Mitral Valve  The mitral valve is normal. Trace mitral insufficiency is present.     Aortic Valve  The valve leaflets are not well visualized. On Doppler interrogation, there is  no significant stenosis or  regurgitation.     Tricuspid Valve  The tricuspid valve is normal. Trace to mild tricuspid insufficiency is  present. The right ventricular systolic pressure is approximated at 27.2 mmHg  plus the right atrial pressure. Pulmonary artery systolic pressure is normal.     Pulmonic Valve  The valve leaflets are not well visualized. On Doppler interrogation, there is  no significant stenosis or regurgitation.     Vessels  The thoracic aorta is normal. The pulmonary artery cannot be assessed. The  inferior vena cava is normal.     Pericardium  No pericardial effusion is present.     ______________________________________________________________________________  MMode/2D Measurements & Calculations  IVSd: 0.92 cm  LVIDd: 4.4 cm  LVIDs: 3.2 cm  LVPWd: 0.83 cm  FS: 28.0 %     LV mass(C)d: 124.2 grams  LV mass(C)dI: 46.4 grams/m2  Ao root diam: 3.0 cm  asc Aorta Diam: 3.7 cm  LVOT diam: 2.3 cm  LVOT area: 4.3 cm2     EF(MOD-bp): 53.1 %  Ao root diam index Ht(cm/m): 1.6  Ao root diam index BSA (cm/m2): 1.1  Asc Ao diam index BSA (cm/m2): 1.4  Asc Ao diam index Ht(cm/m): 2.0  RWT: 0.37  TAPSE: 1.1 cm     Doppler Measurements & Calculations  MV E max luan: 96.1 cm/sec  PA acc time: 0.07 sec  TR max luan: 260.9 cm/sec  TR max P.2 mmHg     RV S Luan: 10.9 cm/sec     ______________________________________________________________________________  Report approved by: Niels Mederos 10/25/2024 12:37 PM           *Note: Due to a large number of results and/or encounters for the requested time period, some results have not been displayed. A complete set of results can be found in Results Review.       Discharge Medications   Current Discharge Medication List        START taking these medications    Details   acetaminophen (TYLENOL) 325 MG tablet Take 2 tablets (650 mg) by mouth every 4 hours as needed for mild pain or other (and adjunct with moderate or severe pain or per patient request).    Associated Diagnoses: Fall from bed,  initial encounter      artificial saliva (BIOTENE MT) SOLN solution Take 1 spray by mouth 4 times daily as needed for dry mouth.    Associated Diagnoses: Morbid obesity (H)      chlorhexidine (PERIDEX) 0.12 % solution Swish and spit 15 mLs in mouth 2 times daily.    Associated Diagnoses: Adequate nutrition      urea (CARMOL) 10 % external lotion Apply topically 2 times daily as needed for dry skin.    Associated Diagnoses: Adequate nutrition           CONTINUE these medications which have CHANGED    Details   metoprolol succinate ER (TOPROL XL) 100 MG 24 hr tablet Take 1 tablet (100 mg) by mouth daily.    Associated Diagnoses: Paroxysmal atrial fibrillation (H)      potassium chloride chen ER (KLOR-CON M20) 20 MEQ CR tablet Take 1 tablet (20 mEq) by mouth 2 times daily.    Associated Diagnoses: Chronic heart failure with preserved ejection fraction (H)      spironolactone (ALDACTONE) 50 MG tablet Take 1 tablet (50 mg) by mouth daily.    Associated Diagnoses: Chronic heart failure with preserved ejection fraction (H)      Torsemide 40 MG TABS Take 80 mg by mouth daily.    Associated Diagnoses: Chronic heart failure with preserved ejection fraction (H)           CONTINUE these medications which have NOT CHANGED    Details   aspirin (ASA) 81 MG chewable tablet Take 1 tablet (81 mg) by mouth daily.  Qty: 200 tablet, Refills: 2    Associated Diagnoses: Essential hypertension      atorvastatin (LIPITOR) 40 MG tablet Take 1 tablet (40 mg) by mouth daily  Qty: 90 tablet, Refills: 3    Associated Diagnoses: Type 2 diabetes mellitus without complication, without long-term current use of insulin (H)      CERTAVITE/ANTIOXIDANTS tablet TAKE ONE TABLET BY MOUTH ONE TIME DAILY  Qty: 100 tablet, Refills: 0    Associated Diagnoses: Type 2 diabetes mellitus without complication, without long-term current use of insulin (H)      ferrous sulfate (FEROSUL) 325 (65 Fe) MG tablet Take 1 tablet (325 mg) by mouth every other day. For iron  deficiency anemia  Qty: 60 tablet, Refills: 1    Associated Diagnoses: Anemia, unspecified type      levothyroxine (SYNTHROID/LEVOTHROID) 175 MCG tablet Take 1 tablet (175 mcg) by mouth every morning (before breakfast)  Qty: 90 tablet, Refills: 3    Associated Diagnoses: Hypothyroidism, unspecified type      metFORMIN (GLUCOPHAGE) 500 MG tablet Take 1 tablet (500 mg) by mouth 2 times daily (with meals).  Qty: 180 tablet, Refills: 3    Associated Diagnoses: Type 2 diabetes mellitus without complication, without long-term current use of insulin (H)      Omega-3 Fatty Acids (EQL FISH OIL) 1000 MG CAPS Take 1 capsule by mouth daily  Qty: 90 capsule, Refills: 0    Associated Diagnoses: Hyperlipidemia LDL goal <100      !! order for DME Equipment being ordered: Diabetes Shoes  Qty: 1 Units, Refills: 0    Associated Diagnoses: Type 2 diabetes mellitus with hyperglycemia, without long-term current use of insulin (H)      !! order for DME Equipment being ordered: Custom diabetic shoes  Qty: 1 Units, Refills: 0    Associated Diagnoses: Type 2 diabetes mellitus without complication, without long-term current use of insulin (H)      !! order for DME Equipment being ordered: Bariatric Lift chair  Diagnosis - morbid obesity, CHF, Lymphedema    Fax to Ne from [employer] [facility] [email or web address] at [phone].  Qty: 1 Units, Refills: 0    Associated Diagnoses: Chronic systolic congestive heart failure (H); Lymphedema; Morbid obesity (H)      !! order for DME Equipment being ordered: Other: Velcro Compression stockings.   Treatment Diagnosis: bilateral lymphedema.  Qty: 2 each, Refills: 0    Associated Diagnoses: Lymphedema of extremity      senna-docusate (SENEXON-S) 8.6-50 MG tablet Take 1 to 2 tablets by mouth 2 times daily as needed for constipation  Qty: 180 tablet, Refills: 0    Associated Diagnoses: Constipation, unspecified constipation type      tamsulosin (FLOMAX) 0.4 MG capsule Take 1 capsule (0.4 mg) by mouth daily  Qty: 90 capsule,  Refills: 3    Associated Diagnoses: Urinary retention      VITAMIN A PO Take 1 tablet by mouth daily.      vitamin B complex with vitamin C (VITAMIN  B COMPLEX) tablet Take 1 tablet by mouth daily  Qty: 100 tablet, Refills: 3    Associated Diagnoses: Adequate nutrition      vitamin C (ASCORBIC ACID) 1000 MG TABS Take 1,000 mg by mouth daily      Vitamin D, Cholecalciferol, 25 MCG (1000 UT) CAPS Take 1 capsule by mouth daily.      vitamin E (VITAMIN E COMPLEX) 1000 units (450 mg) capsule Take 1 capsule (1,000 Units) by mouth daily  Qty: 100 capsule, Refills: 3    Associated Diagnoses: Adequate nutrition      warfarin ANTICOAGULANT (COUMADIN) 5 MG tablet Take 5 mg (5 mg x 1) every Thu; 10 mg (5 mg x 2) all other days or as directed by the INR clinic.  Qty: 180 tablet, Refills: 3    Associated Diagnoses: Persistent atrial fibrillation (H)       !! - Potential duplicate medications found. Please discuss with provider.        Allergies   Allergies   Allergen Reactions    Seasonal Allergies

## 2024-11-02 NOTE — PROGRESS NOTES
Shift [id number]    No acute events overnight. Pt AxOx4, denies sob, chest pain, or cough. Denies pain. Pt has no other concern during the shift. Pt slept through the night, respiration nonlabor and regular pattern. Call light within reach. Possible discharge today to TCU.    Temp: 98.2  F (36.8  C) Temp src: Oral BP: 126/60 Pulse: 96   Resp: 18 SpO2: 98 % O2 Device: None (Room air)

## 2024-11-02 NOTE — PROGRESS NOTES
Care Management Discharge Note    Discharge Date: 11/02/2024       Discharge Disposition: [facility]-Nurse to Nurse Ph: [phone]    Discharge Services:  Therapies    Discharge DME: TBD    Discharge Transportation:  [facility] 9:40-10:24    Private pay costs discussed: Not applicable    Does the patient's insurance plan have a 3 day qualifying hospital stay waiver?  Yes     Which insurance plan 3 day waiver is available? Alternative insurance waiver    Will the waiver be used for post-acute placement? Yes    PAS Confirmation Code:  It's completed.   Patient/family educated on Medicare website which has current facility and service quality ratings:  Yes    Education Provided on the Discharge Plan: Yes  Persons Notified of Discharge Plans: Pt, attending, bedside nurse.   Patient/Family in Agreement with the Plan: yes    Handoff Referral Completed: No, handoff not indicated or clinically appropriate    Additional Information:   completed PCS form. Faxed discharge orders to [phone].     Yusef Snow, AARON   11/2/2024       Social Work and Care Management Department       SEARCHABLE in [employer] - search SOCIAL WORK       [place] (0800 - 1630) Saturday and Sunday     Units: 4A Vocera, 4C Vocera, & 4E Vocera        Units: 5A 4089-0010 Vocera, 5A 4424-7767 Vocera , BMT SW 1 BMT SW 2, BMT SW 3 & BMT SW 4  5C Off Service 5401 - 5416  5C Off Service [id number]     Units: 6A Vocera & 6B Vocera      Units: 6C Vocera     Units: 7A Vocera & 7B Vocera      Units: 7C Med Surg 7401 thru 7418 and 7C Med Surg 7502 thru 7521      Unit: [place] ED Vocera & [place] Obs Vocera     [facility] ([phone]) Saturday and Sunday      Units: 5 Ortho Vocera, 5 Med Surg Vocera & WB ED Vocera     Units: 6 Med Surg Vocera, 8 Med Surg Vocera, & 10 ICU Vocera      After hours Vocera [facility] and After Hours Vocera [place]     Please NOTE changes to times below:    **Saturday &  Sunday (1630 - 2030)    **Mon-Fri (1600-2030)     **[facility] Recognized Holidays  ([id number])    Units: ALL   - see above VOCERA links to units

## 2024-11-03 ENCOUNTER — LAB REQUISITION (OUTPATIENT)
Dept: LAB | Facility: CLINIC | Age: 74
End: 2024-11-03
Payer: COMMERCIAL

## 2024-11-03 DIAGNOSIS — Z11.1 ENCOUNTER FOR SCREENING FOR RESPIRATORY TUBERCULOSIS: ICD-10-CM

## 2024-11-03 DIAGNOSIS — I48.20 CHRONIC ATRIAL FIBRILLATION, UNSPECIFIED (H): ICD-10-CM

## 2024-11-04 ENCOUNTER — TRANSITIONAL CARE UNIT VISIT (OUTPATIENT)
Dept: GERIATRICS | Facility: CLINIC | Age: 74
End: 2024-11-04
Payer: COMMERCIAL

## 2024-11-04 ENCOUNTER — DOCUMENTATION ONLY (OUTPATIENT)
Dept: GERIATRICS | Facility: CLINIC | Age: 74
End: 2024-11-04

## 2024-11-04 ENCOUNTER — DOCUMENTATION ONLY (OUTPATIENT)
Dept: ANTICOAGULATION | Facility: CLINIC | Age: 74
End: 2024-11-04

## 2024-11-04 ENCOUNTER — LAB REQUISITION (OUTPATIENT)
Dept: LAB | Facility: CLINIC | Age: 74
End: 2024-11-04
Payer: COMMERCIAL

## 2024-11-04 VITALS
RESPIRATION RATE: 18 BRPM | WEIGHT: 315 LBS | BODY MASS INDEX: 44.1 KG/M2 | DIASTOLIC BLOOD PRESSURE: 62 MMHG | OXYGEN SATURATION: 98 % | SYSTOLIC BLOOD PRESSURE: 126 MMHG | HEART RATE: 92 BPM | TEMPERATURE: 97.9 F | HEIGHT: 71 IN

## 2024-11-04 DIAGNOSIS — M25.562 CHRONIC PAIN OF BOTH KNEES: ICD-10-CM

## 2024-11-04 DIAGNOSIS — I11.0 HYPERTENSIVE HEART DISEASE WITH CHRONIC DIASTOLIC CONGESTIVE HEART FAILURE (H): ICD-10-CM

## 2024-11-04 DIAGNOSIS — F33.1 MODERATE EPISODE OF RECURRENT MAJOR DEPRESSIVE DISORDER (H): ICD-10-CM

## 2024-11-04 DIAGNOSIS — I50.32 HYPERTENSIVE HEART DISEASE WITH CHRONIC DIASTOLIC CONGESTIVE HEART FAILURE (H): ICD-10-CM

## 2024-11-04 DIAGNOSIS — R53.81 PHYSICAL DECONDITIONING: ICD-10-CM

## 2024-11-04 DIAGNOSIS — R53.1 WEAKNESS: ICD-10-CM

## 2024-11-04 DIAGNOSIS — E66.01 MORBID OBESITY (H): ICD-10-CM

## 2024-11-04 DIAGNOSIS — E03.9 HYPOTHYROIDISM, UNSPECIFIED TYPE: ICD-10-CM

## 2024-11-04 DIAGNOSIS — G89.29 CHRONIC PAIN OF BOTH KNEES: ICD-10-CM

## 2024-11-04 DIAGNOSIS — N40.0 BENIGN PROSTATIC HYPERPLASIA WITHOUT LOWER URINARY TRACT SYMPTOMS: ICD-10-CM

## 2024-11-04 DIAGNOSIS — R29.6 FALLS FREQUENTLY: Primary | ICD-10-CM

## 2024-11-04 DIAGNOSIS — E11.22 TYPE 2 DIABETES MELLITUS WITH STAGE 3B CHRONIC KIDNEY DISEASE, WITHOUT LONG-TERM CURRENT USE OF INSULIN (H): ICD-10-CM

## 2024-11-04 DIAGNOSIS — I48.20 CHRONIC ATRIAL FIBRILLATION (H): ICD-10-CM

## 2024-11-04 DIAGNOSIS — I50.32 CHRONIC HEART FAILURE WITH PRESERVED EJECTION FRACTION (H): ICD-10-CM

## 2024-11-04 DIAGNOSIS — Z79.01 LONG TERM (CURRENT) USE OF ANTICOAGULANTS: ICD-10-CM

## 2024-11-04 DIAGNOSIS — N18.32 TYPE 2 DIABETES MELLITUS WITH STAGE 3B CHRONIC KIDNEY DISEASE, WITHOUT LONG-TERM CURRENT USE OF INSULIN (H): ICD-10-CM

## 2024-11-04 DIAGNOSIS — M25.561 CHRONIC PAIN OF BOTH KNEES: ICD-10-CM

## 2024-11-04 DIAGNOSIS — G47.33 OSA (OBSTRUCTIVE SLEEP APNEA): ICD-10-CM

## 2024-11-04 DIAGNOSIS — D64.9 ANEMIA, UNSPECIFIED TYPE: ICD-10-CM

## 2024-11-04 LAB — INR PPP: 2.72 (ref 0.85–1.15)

## 2024-11-04 PROCEDURE — 85610 PROTHROMBIN TIME: CPT | Mod: ORL | Performed by: INTERNAL MEDICINE

## 2024-11-04 PROCEDURE — P9604 ONE-WAY ALLOW PRORATED TRIP: HCPCS | Mod: ORL | Performed by: INTERNAL MEDICINE

## 2024-11-04 PROCEDURE — 99309 SBSQ NF CARE MODERATE MDM 30: CPT | Performed by: NURSE PRACTITIONER

## 2024-11-04 PROCEDURE — 86481 TB AG RESPONSE T-CELL SUSP: CPT | Mod: ORL | Performed by: INTERNAL MEDICINE

## 2024-11-04 PROCEDURE — 36415 COLL VENOUS BLD VENIPUNCTURE: CPT | Mod: ORL | Performed by: INTERNAL MEDICINE

## 2024-11-04 NOTE — PROGRESS NOTES
ANTICOAGULATION  MANAGEMENT: Discharge Review    Clarke Moreira chart reviewed for anticoagulation continuity of care    Hospital Admission on 10/25/24-10/30/24 for mechanical Fall-inability to care for himself. Re-admission 10/30/24-11/2/24 then transferred to nursing home care/TCU at [facility] ([phone])    Discharge disposition: TCU    Results:    Recent labs: (last 7 days)     10/29/24  0526 10/30/24  0638 10/31/24  0840 11/01/24  0940 11/02/24  0712 11/04/24  0504   INR 1.73* 1.67* 1.72* 1.79* 1.96* 2.72*     Anticoagulation inpatient management:     [facility] calendar was previously updated on 10/31/24    Anticoagulation discharge instructions:     Warfarin dosing:  See [facility] calendar   Bridging: No   INR goal change: No      Medication changes affecting anticoagulation: No    Additional factors affecting anticoagulation: No     PLAN     No adjustment to anticoagulation plan needed. Spoke with Staff at [facility]. A provider there will manage warfarin dosing.    [facility] will resume monitoring upon discharge from TCU    No adjustment to Anticoagulation Calendar was required    Any Tovar, RN  11/4/2024  Anticoagulation Clinic  [facility] [employer] for routing messages: [email or web address]  [facility] patient phone line: [phone]

## 2024-11-04 NOTE — PROGRESS NOTES
[facility] GERIATRICS    PRIMARY CARE PROVIDER AND CLINIC:  Henrry Shepard MD, [address] [place] [address] / [facility] [address]  Chief Complaint   Patient presents with    Hospital F/U      [place] Medical Record Number:  [id number]  Place of Service where encounter took place:  [facility] ([facility]) [[id number]]    Clarke Moreira  is a 74 year old  (1950) male with history of DMII, CKD, HFpEF, A fib, depression, HTONG, chronic anemia, hypothyroidism, HLD, and BPH.  He was hospitalized 9/22 through 10/3/2024 at the [facility] for presyncopal episodes thought to be vasovagal.  Developed refeeding syndrome and A-fib with RVR and citrobacter bacteremia.  Went to the TCU.  While he was at home, his knees buckled and he had a fall on 10/25/2024.  Creatinine elevated to 1.77 up from baseline of 1.  Imaging without fractures, UA and blood cultures without infection.  Developed hypotension secondary to poor oral intake while at home, responded well to IV fluids.  Spironolactone discontinued and torsemide dose decreased.  Ejection fraction 55 to 60%.  Given his inability to care for himself at home, TCU was recommended and he was admitted to the above facility from  [facility]. Hospital stay 10/25/2024 through 11/2/2024..     Clarke was visited today well in his bed resting.  He reports that it was a slow weekend as there was no therapy but he is feeling very optimistic today after he had evaluations from both PT and OT.  He has been very happy with the staff and states that he is getting exceptional service here at the TCU.  His appetite has been good and feels that the food is of good quality.  No complaints of pain with urination and having regular bowel movements.  He is having pain in bilateral knees.  Is not interested in a steroid injection but is willing to try diclofenac.  He is hopeful that he will be able to return home  where he lives independently.    CODE STATUS/ADVANCE DIRECTIVES DISCUSSION:  Prior  DNR only  ALLERGIES:   Allergies   Allergen Reactions    Seasonal Allergies       PAST MEDICAL HISTORY:   Past Medical History:   Diagnosis Date    Atrial fibrillation (H)     Basal cell carcinoma     Chronic anticoagulation     Diastolic heart failure (H)     Dyslipidemia     Essential hypertension     Morbid obesity (H)     Sleep-disordered breathing     Type 2 diabetes mellitus (H)       PAST SURGICAL HISTORY:   has a past surgical history that includes biopsy of skin lesion; Mohs micrographic procedure; and picc insertion (Right, 09/24/2017).  FAMILY HISTORY: family history includes Depression in his mother; Glaucoma in his maternal grandfather.  SOCIAL HISTORY:   reports that he has never smoked. He has never used smokeless tobacco. He reports that he does not drink alcohol and does not use drugs.  Patient's living condition: lives alone    Post Discharge Medication Reconciliation Status:   MED REC REQUIRED  Post Medication Reconciliation Status: discharge medications reconciled, continue medications without change       Current Outpatient Medications   Medication Sig Dispense Refill    acetaminophen (TYLENOL) 325 MG tablet Take 2 tablets (650 mg) by mouth every 4 hours as needed for mild pain or other (and adjunct with moderate or severe pain or per patient request).      artificial saliva (BIOTENE MT) SOLN solution Take 1 spray by mouth 4 times daily as needed for dry mouth.      aspirin (ASA) 81 MG chewable tablet Take 1 tablet (81 mg) by mouth daily. 200 tablet 2    atorvastatin (LIPITOR) 40 MG tablet Take 1 tablet (40 mg) by mouth daily 90 tablet 3    CERTAVITE/ANTIOXIDANTS tablet TAKE ONE TABLET BY MOUTH ONE TIME DAILY 100 tablet 0    chlorhexidine (PERIDEX) 0.12 % solution Swish and spit 15 mLs in mouth 2 times daily.      ferrous sulfate (FEROSUL) 325 (65 Fe) MG tablet Take 1 tablet (325 mg) by mouth every other day. For  iron deficiency anemia 60 tablet 1    levothyroxine (SYNTHROID/LEVOTHROID) 175 MCG tablet Take 1 tablet (175 mcg) by mouth every morning (before breakfast) 90 tablet 3    metFORMIN (GLUCOPHAGE) 500 MG tablet Take 1 tablet (500 mg) by mouth 2 times daily (with meals). 180 tablet 3    metoprolol succinate ER (TOPROL XL) 100 MG 24 hr tablet Take 1 tablet (100 mg) by mouth daily.      Omega-3 Fatty Acids (EQL FISH OIL) 1000 MG CAPS Take 1 capsule by mouth daily 90 capsule 0    order for DME Equipment being ordered: Diabetes Shoes 1 Units 0    order for DME Equipment being ordered: Custom diabetic shoes 1 Units 0    order for DME Equipment being ordered: Bariatric Lift chair  Diagnosis - morbid obesity, CHF, Lymphedema    Fax to Ne from [facility] at [phone]. 1 Units 0    order for DME Equipment being ordered: Other: Velcro Compression stockings.   Treatment Diagnosis: bilateral lymphedema. 2 each 0    potassium chloride chen ER (KLOR-CON M20) 20 MEQ CR tablet Take 1 tablet (20 mEq) by mouth 2 times daily.      senna-docusate (SENEXON-S) 8.6-50 MG tablet Take 1 to 2 tablets by mouth 2 times daily as needed for constipation 180 tablet 0    spironolactone (ALDACTONE) 50 MG tablet Take 1 tablet (50 mg) by mouth daily.      tamsulosin (FLOMAX) 0.4 MG capsule Take 1 capsule (0.4 mg) by mouth daily 90 capsule 3    Torsemide 40 MG TABS Take 80 mg by mouth daily.      urea (CARMOL) 10 % external lotion Apply topically 2 times daily as needed for dry skin.      VITAMIN A PO Take 1 tablet by mouth daily.      vitamin B complex with vitamin C (VITAMIN  B COMPLEX) tablet Take 1 tablet by mouth daily 100 tablet 3    vitamin C (ASCORBIC ACID) 1000 MG TABS Take 1,000 mg by mouth daily      Vitamin D, Cholecalciferol, 25 MCG (1000 UT) CAPS Take 1 capsule by mouth daily.      vitamin E (VITAMIN E COMPLEX) 1000 units (450 mg) capsule Take 1 capsule (1,000 Units) by mouth daily 100 capsule 3    warfarin ANTICOAGULANT (COUMADIN) 5  "MG tablet Take 5 mg (5 mg x 1) every Thu; 10 mg (5 mg x 2) all other days or as directed by the INR clinic. (Patient taking differently: Patient takes 10 mg (5 mg x 2) everyday or as directed by the INR clinic.) 180 tablet 3     No current facility-administered medications for this visit.       ROS:  10 point ROS of systems including Constitutional, Eyes, Respiratory, Cardiovascular, Gastroenterology, Genitourinary, Integumentary, Musculoskeletal, Psychiatric were all negative except for pertinent positives noted in my HPI.    Vitals:  /62   Pulse 92   Temp 97.9  F (36.6  C)   Resp 18   Ht 1.803 m (5' 11\")   Wt 149 kg (328 lb 8 oz)   SpO2 98%   BMI 45.82 kg/m    Exam:  GENERAL APPEARANCE:  Alert, in no distress, pleasant, cooperative, oriented x 4  EYES: no discharge or mattering on lids or lashes noted  ENT:  moist mucous membranes, hearing acuity intact  NECK: supple, symmetrical  RESP: no respiratory distress, Lung sounds clear, patient is on room air  CV:  rate and rhythm regular, no murmur. Edema trace in bilateral lower extremities. VASCULAR: warm extremities without open areas.  ABDOMEN: obese, normal bowel sounds, soft, nontender.  M/S:   Gait and station: unsafe without assistance. no tenderness or swelling of the joints; able to move all extremities   SKIN:  Inspection and palpation of skin and subcutaneous tissue: skin warm, dry and intact without rashes  NEURO: no facial asymmetry, no speech deficits and able to follow directions, moves all extremities symmetrically  PSYCH:  insight and judgement intact, memory intact, affect and mood normal    Lab/Diagnostic data:  CBC RESULTS:   Recent Labs   Lab Test 10/28/24  0612 10/27/24  0706   WBC 8.3 8.9   RBC 4.87 4.70   HGB 11.5* 11.1*   HCT 37.9* 36.4*   MCV 78 77*   MCH 23.6* 23.6*   MCHC 30.3* 30.5*   RDW 17.2* 17.3*    278       Last Basic Metabolic Panel:  Recent Labs   Lab Test 11/02/24  0744 11/01/24  1723 10/28/24  0645 " 10/28/24  0612 10/27/24  1046 10/27/24  0706   NA  --   --   --  136  --  135   POTASSIUM  --   --   --  3.8  --  3.6   CHLORIDE  --   --   --  101  --  97*   CHERI  --   --   --  9.5  --  9.0   CO2  --   --   --  25  --  29   BUN  --   --   --  10.7  --  11.5   CR  --   --   --  0.90  --  1.06   * 138*   < > 125*   < > 129*    < > = values in this interval not displayed.       Liver Function Studies -   Recent Labs   Lab Test 10/25/24  0454 09/23/24  0638   PROTTOTAL 7.1 6.6   ALBUMIN 3.6 3.6   BILITOTAL 0.7 0.7   ALKPHOS 119 97   AST 27 45   ALT 12 17       TSH   Date Value Ref Range Status   10/07/2024 0.93 0.30 - 4.20 uIU/mL Final   09/22/2024 0.79 0.30 - 4.20 uIU/mL Final   05/09/2022 3.98 0.40 - 4.00 mU/L Final   11/09/2020 2.69 0.40 - 4.00 mU/L Final   11/05/2019 0.75 0.40 - 4.00 mU/L Final   ]    Lab Results   Component Value Date    A1C 6.2 10/07/2024    A1C 6.5 09/26/2024    A1C 6.2 05/06/2021    A1C 5.9 11/09/2020       ASSESSMENT/PLAN:  Lower back pain    Bilateral knee hip pain   Has bilateral knee pain.  Did have several falls at home due to his knees buckling.  He is not interested in steroid injections but would like to try Voltaren gel.  -- Start Voltaren gel 1%.  Apply 2 g to bilateral knees 3 times daily.  -- Ongoing PT OT for strengthening    HFpEF  Hypertensive heart disease   Echo  10/25:  EF 55-60%.  Spironolactone was held during hospitalization and torsemide was decreased from 80 mg down to 40.  It appears that both of these medications were returned back to home dose at discharge so we will need to watch fluid status as well as oral intake to ensure he is not becoming dried out.  --Continue with spironolactone 50 mg daily  --Torsemide 80 mg daily  --Continue to monitor blood pressure and heart rate, adjust medications as needed.     Paroxysmal A fib  Long term anticoagulation   CHADSVASC 5. INR 2.72 today. HR 70-90s  -- Coumadin 10 mg daily with 5 mg on Thursdays. Recheck INR on  Thursday.   -- Continue with metoprolol  mg daily.      Chronic hypercapnic respiratory failure   THONG  --continue with CPAP.     CKD III:   baseline Cr around 1. Elevated to 1.77 on hospital admission. Last creatinine of 0.90 on 10/28.  --Avoid nephrotoxic medications. Recheck BMP on 11/5.     T2DM with neuropathy  obesity   A1c 6.2 10/7. Body mass index is 45.82 kg/m .  -  Metformin 500 mg bid     HLD:  - Continue PTA ASA and statin     Depression  anxiety  Was feeling depressed during hospitalization.  Seen by psych.  He seems more optimistic today and is in a good mood.   -- ACP to follow here at the TCU.    Chronic microcytic anemia  baseline  hgb 10.5-12. Iron low to 14 9/22/2024.   -Continue iron supplement daily  - may need out patient  GI work up       Hypothyroidism:   TSH 0.93 10/7/24  -- Continue synthroid      BPH  --Continue Flomax     Physical deconditioning  Secondary to recent hospitalization and underlying medical conditions.   --ongoing PT/OT for strengthening.   -- Social work for discharge planning      Electronically signed by:  REAGAN Crandall CNP

## 2024-11-04 NOTE — LETTER
11/4/2024      Clarke Moreira  [address]  [facility] [address]        [facility] GERIATRICS    PRIMARY CARE PROVIDER AND CLINIC:  Henrry Shepard MD, [address] / [facility] [id number]  Chief Complaint   Patient presents with     Hospital F/U      [facility] Medical Record Number:  [id number]  Place of Service where encounter took place:  [facility] (TCU) [[id number]]    Clarke Moreira  is a 74 year old  (1950) male with history of DMII, CKD, HFpEF, A fib, depression, THONG, chronic anemia, hypothyroidism, HLD, and BPH.  He was hospitalized 9/22 through 10/3/2024 at the [facility] for presyncopal episodes thought to be vasovagal.  Developed refeeding syndrome and A-fib with RVR and citrobacter bacteremia.  Went to the TCU.  While he was at home, his knees buckled and he had a fall on 10/25/2024.  Creatinine elevated to 1.77 up from baseline of 1.  Imaging without fractures, UA and blood cultures without infection.  Developed hypotension secondary to poor oral intake while at home, responded well to IV fluids.  Spironolactone discontinued and torsemide dose decreased.  Ejection fraction 55 to 60%.  Given his inability to care for himself at home, TCU was recommended and he was admitted to the above facility from  [facility]. Hospital stay 10/25/2024 through 11/2/2024..     Clarke was visited today well in his bed resting.  He reports that it was a slow weekend as there was no therapy but he is feeling very optimistic today after he had evaluations from both PT and OT.  He has been very happy with the staff and states that he is getting exceptional service here at the TCU.  His appetite has been good and feels that the food is of good quality.  No complaints of pain with urination and having regular bowel movements.  He is having pain in bilateral knees.  Is not interested in a steroid  injection but is willing to try diclofenac.  He is hopeful that he will be able to return home where he lives independently.    CODE STATUS/ADVANCE DIRECTIVES DISCUSSION:  Prior  DNR only  ALLERGIES:   Allergies   Allergen Reactions     Seasonal Allergies       PAST MEDICAL HISTORY:   Past Medical History:   Diagnosis Date     Atrial fibrillation (H)      Basal cell carcinoma      Chronic anticoagulation      Diastolic heart failure (H)      Dyslipidemia      Essential hypertension      Morbid obesity (H)      Sleep-disordered breathing      Type 2 diabetes mellitus (H)       PAST SURGICAL HISTORY:   has a past surgical history that includes biopsy of skin lesion; Mohs micrographic procedure; and picc insertion (Right, 09/24/2017).  FAMILY HISTORY: family history includes Depression in his mother; Glaucoma in his maternal grandfather.  SOCIAL HISTORY:   reports that he has never smoked. He has never used smokeless tobacco. He reports that he does not drink alcohol and does not use drugs.  Patient's living condition: lives alone    Post Discharge Medication Reconciliation Status:   MED REC REQUIRED  Post Medication Reconciliation Status: discharge medications reconciled, continue medications without change       Current Outpatient Medications   Medication Sig Dispense Refill     acetaminophen (TYLENOL) 325 MG tablet Take 2 tablets (650 mg) by mouth every 4 hours as needed for mild pain or other (and adjunct with moderate or severe pain or per patient request).       artificial saliva (BIOTENE MT) SOLN solution Take 1 spray by mouth 4 times daily as needed for dry mouth.       aspirin (ASA) 81 MG chewable tablet Take 1 tablet (81 mg) by mouth daily. 200 tablet 2     atorvastatin (LIPITOR) 40 MG tablet Take 1 tablet (40 mg) by mouth daily 90 tablet 3     CERTAVITE/ANTIOXIDANTS tablet TAKE ONE TABLET BY MOUTH ONE TIME DAILY 100 tablet 0     chlorhexidine (PERIDEX) 0.12 % solution Swish and spit 15 mLs in mouth 2 times  daily.       ferrous sulfate (FEROSUL) 325 (65 Fe) MG tablet Take 1 tablet (325 mg) by mouth every other day. For iron deficiency anemia 60 tablet 1     levothyroxine (SYNTHROID/LEVOTHROID) 175 MCG tablet Take 1 tablet (175 mcg) by mouth every morning (before breakfast) 90 tablet 3     metFORMIN (GLUCOPHAGE) 500 MG tablet Take 1 tablet (500 mg) by mouth 2 times daily (with meals). 180 tablet 3     metoprolol succinate ER (TOPROL XL) 100 MG 24 hr tablet Take 1 tablet (100 mg) by mouth daily.       Omega-3 Fatty Acids (EQL FISH OIL) 1000 MG CAPS Take 1 capsule by mouth daily 90 capsule 0     order for DME Equipment being ordered: Diabetes Shoes 1 Units 0     order for DME Equipment being ordered: Custom diabetic shoes 1 Units 0     order for DME Equipment being ordered: Bariatric Lift chair  Diagnosis - morbid obesity, CHF, Lymphedema    Fax to Ne from [employer] at [phone]. 1 Units 0     order for DME Equipment being ordered: Other: Velcro Compression stockings.   Treatment Diagnosis: bilateral lymphedema. 2 each 0     potassium chloride chen ER (KLOR-CON M20) 20 MEQ CR tablet Take 1 tablet (20 mEq) by mouth 2 times daily.       senna-docusate (SENEXON-S) 8.6-50 MG tablet Take 1 to 2 tablets by mouth 2 times daily as needed for constipation 180 tablet 0     spironolactone (ALDACTONE) 50 MG tablet Take 1 tablet (50 mg) by mouth daily.       tamsulosin (FLOMAX) 0.4 MG capsule Take 1 capsule (0.4 mg) by mouth daily 90 capsule 3     Torsemide 40 MG TABS Take 80 mg by mouth daily.       urea (CARMOL) 10 % external lotion Apply topically 2 times daily as needed for dry skin.       VITAMIN A PO Take 1 tablet by mouth daily.       vitamin B complex with vitamin C (VITAMIN  B COMPLEX) tablet Take 1 tablet by mouth daily 100 tablet 3     vitamin C (ASCORBIC ACID) 1000 MG TABS Take 1,000 mg by mouth daily       Vitamin D, Cholecalciferol, 25 MCG (1000 UT) CAPS Take 1 capsule by mouth daily.       vitamin E (VITAMIN E  "COMPLEX) 1000 units (450 mg) capsule Take 1 capsule (1,000 Units) by mouth daily 100 capsule 3     warfarin ANTICOAGULANT (COUMADIN) 5 MG tablet Take 5 mg (5 mg x 1) every Thu; 10 mg (5 mg x 2) all other days or as directed by the INR clinic. (Patient taking differently: Patient takes 10 mg (5 mg x 2) everyday or as directed by the INR clinic.) 180 tablet 3     No current facility-administered medications for this visit.       ROS:  10 point ROS of systems including Constitutional, Eyes, Respiratory, Cardiovascular, Gastroenterology, Genitourinary, Integumentary, Musculoskeletal, Psychiatric were all negative except for pertinent positives noted in my HPI.    Vitals:  /62   Pulse 92   Temp 97.9  F (36.6  C)   Resp 18   Ht 1.803 m (5' 11\")   Wt 149 kg (328 lb 8 oz)   SpO2 98%   BMI 45.82 kg/m    Exam:  GENERAL APPEARANCE:  Alert, in no distress, pleasant, cooperative, oriented x 4  EYES: no discharge or mattering on lids or lashes noted  ENT:  moist mucous membranes, hearing acuity intact  NECK: supple, symmetrical  RESP: no respiratory distress, Lung sounds clear, patient is on room air  CV:  rate and rhythm regular, no murmur. Edema trace in bilateral lower extremities. VASCULAR: warm extremities without open areas.  ABDOMEN: obese, normal bowel sounds, soft, nontender.  M/S:   Gait and station: unsafe without assistance. no tenderness or swelling of the joints; able to move all extremities   SKIN:  Inspection and palpation of skin and subcutaneous tissue: skin warm, dry and intact without rashes  NEURO: no facial asymmetry, no speech deficits and able to follow directions, moves all extremities symmetrically  PSYCH:  insight and judgement intact, memory intact, affect and mood normal    Lab/Diagnostic data:  CBC RESULTS:   Recent Labs   Lab Test 10/28/24  0612 10/27/24  0706   WBC 8.3 8.9   RBC 4.87 4.70   HGB 11.5* 11.1*   HCT 37.9* 36.4*   MCV 78 77*   MCH 23.6* 23.6*   MCHC 30.3* 30.5*   RDW 17.2* " 17.3*    278       Last Basic Metabolic Panel:  Recent Labs   Lab Test 11/02/24  0744 11/01/24  1723 10/28/24  0645 10/28/24  0612 10/27/24  1046 10/27/24  0706   NA  --   --   --  136  --  135   POTASSIUM  --   --   --  3.8  --  3.6   CHLORIDE  --   --   --  101  --  97*   CHERI  --   --   --  9.5  --  9.0   CO2  --   --   --  25  --  29   BUN  --   --   --  10.7  --  11.5   CR  --   --   --  0.90  --  1.06   * 138*   < > 125*   < > 129*    < > = values in this interval not displayed.       Liver Function Studies -   Recent Labs   Lab Test 10/25/24  0454 09/23/24  0638   PROTTOTAL 7.1 6.6   ALBUMIN 3.6 3.6   BILITOTAL 0.7 0.7   ALKPHOS 119 97   AST 27 45   ALT 12 17       TSH   Date Value Ref Range Status   10/07/2024 0.93 0.30 - 4.20 uIU/mL Final   09/22/2024 0.79 0.30 - 4.20 uIU/mL Final   05/09/2022 3.98 0.40 - 4.00 mU/L Final   11/09/2020 2.69 0.40 - 4.00 mU/L Final   11/05/2019 0.75 0.40 - 4.00 mU/L Final   ]    Lab Results   Component Value Date    A1C 6.2 10/07/2024    A1C 6.5 09/26/2024    A1C 6.2 05/06/2021    A1C 5.9 11/09/2020       ASSESSMENT/PLAN:  Lower back pain    Bilateral knee hip pain   Has bilateral knee pain.  Did have several falls at home due to his knees buckling.  He is not interested in steroid injections but would like to try Voltaren gel.  -- Start Voltaren gel 1%.  Apply 2 g to bilateral knees 3 times daily.  -- Ongoing PT OT for strengthening    HFpEF  Hypertensive heart disease   Echo  10/25:  EF 55-60%.  Spironolactone was held during hospitalization and torsemide was decreased from 80 mg down to 40.  It appears that both of these medications were returned back to home dose at discharge so we will need to watch fluid status as well as oral intake to ensure he is not becoming dried out.  --Continue with spironolactone 50 mg daily  --Torsemide 80 mg daily  --Continue to monitor blood pressure and heart rate, adjust medications as needed.     Paroxysmal A fib  Long term  anticoagulation   CHADSVASC 5. INR 2.72 today. HR 70-90s  -- Coumadin 10 mg daily with 5 mg on Thursdays. Recheck INR on Thursday.   -- Continue with metoprolol  mg daily.      Chronic hypercapnic respiratory failure   THONG  --continue with CPAP.     CKD III:   baseline Cr around 1. Elevated to 1.77 on hospital admission. Last creatinine of 0.90 on 10/28.  --Avoid nephrotoxic medications. Recheck BMP on 11/5.     T2DM with neuropathy  obesity   A1c 6.2 10/7. Body mass index is 45.82 kg/m .  -  Metformin 500 mg bid     HLD:  - Continue PTA ASA and statin     Depression  anxiety  Was feeling depressed during hospitalization.  Seen by psych.  He seems more optimistic today and is in a good mood.   -- ACP to follow here at the TCU.    Chronic microcytic anemia  baseline  hgb 10.5-12. Iron low to 14 9/22/2024.   -Continue iron supplement daily  - may need out patient  GI work up       Hypothyroidism:   TSH 0.93 10/7/24  -- Continue synthroid      BPH  --Continue Flomax     Physical deconditioning  Secondary to recent hospitalization and underlying medical conditions.   --ongoing PT/OT for strengthening.   -- Social work for discharge planning      Electronically signed by:  REAGAN Crandall CNP                   Sincerely,        REAGAN Crandall CNP

## 2024-11-05 PROBLEM — N18.32 TYPE 2 DIABETES MELLITUS WITH STAGE 3B CHRONIC KIDNEY DISEASE, WITHOUT LONG-TERM CURRENT USE OF INSULIN (H): Status: ACTIVE | Noted: 2024-11-05

## 2024-11-05 PROBLEM — E11.22 TYPE 2 DIABETES MELLITUS WITH STAGE 3B CHRONIC KIDNEY DISEASE, WITHOUT LONG-TERM CURRENT USE OF INSULIN (H): Status: ACTIVE | Noted: 2024-11-05

## 2024-11-05 LAB
ANION GAP SERPL CALCULATED.3IONS-SCNC: 12 MMOL/L (ref 7–15)
BASOPHILS # BLD AUTO: 0 10E3/UL (ref 0–0.2)
BASOPHILS NFR BLD AUTO: 1 %
BUN SERPL-MCNC: 14.8 MG/DL (ref 8–23)
CALCIUM SERPL-MCNC: 8.7 MG/DL (ref 8.8–10.4)
CHLORIDE SERPL-SCNC: 95 MMOL/L (ref 98–107)
CREAT SERPL-MCNC: 0.97 MG/DL (ref 0.67–1.17)
EGFRCR SERPLBLD CKD-EPI 2021: 82 ML/MIN/1.73M2
EOSINOPHIL # BLD AUTO: 0.3 10E3/UL (ref 0–0.7)
EOSINOPHIL NFR BLD AUTO: 4 %
ERYTHROCYTE [DISTWIDTH] IN BLOOD BY AUTOMATED COUNT: 17.2 % (ref 10–15)
GAMMA INTERFERON BACKGROUND BLD IA-ACNC: 0.03 IU/ML
GLUCOSE SERPL-MCNC: 117 MG/DL (ref 70–99)
HCO3 SERPL-SCNC: 32 MMOL/L (ref 22–29)
HCT VFR BLD AUTO: 37.4 % (ref 40–53)
HGB BLD-MCNC: 11.5 G/DL (ref 13.3–17.7)
IMM GRANULOCYTES # BLD: 0 10E3/UL
IMM GRANULOCYTES NFR BLD: 0 %
LYMPHOCYTES # BLD AUTO: 1.8 10E3/UL (ref 0.8–5.3)
LYMPHOCYTES NFR BLD AUTO: 25 %
M TB IFN-G BLD-IMP: NEGATIVE
M TB IFN-G CD4+ BCKGRND COR BLD-ACNC: 9.97 IU/ML
MCH RBC QN AUTO: 23.3 PG (ref 26.5–33)
MCHC RBC AUTO-ENTMCNC: 30.7 G/DL (ref 31.5–36.5)
MCV RBC AUTO: 76 FL (ref 78–100)
MITOGEN IGNF BCKGRD COR BLD-ACNC: 0.06 IU/ML
MITOGEN IGNF BCKGRD COR BLD-ACNC: 0.07 IU/ML
MONOCYTES # BLD AUTO: 1 10E3/UL (ref 0–1.3)
MONOCYTES NFR BLD AUTO: 14 %
NEUTROPHILS # BLD AUTO: 4.1 10E3/UL (ref 1.6–8.3)
NEUTROPHILS NFR BLD AUTO: 57 %
NRBC # BLD AUTO: 0 10E3/UL
NRBC BLD AUTO-RTO: 0 /100
PLATELET # BLD AUTO: 332 10E3/UL (ref 150–450)
POTASSIUM SERPL-SCNC: 2.8 MMOL/L (ref 3.4–5.3)
QUANTIFERON MITOGEN: 10 IU/ML
QUANTIFERON NIL TUBE: 0.03 IU/ML
QUANTIFERON TB1 TUBE: 0.1 IU/ML
QUANTIFERON TB2 TUBE: 0.09
RBC # BLD AUTO: 4.94 10E6/UL (ref 4.4–5.9)
SODIUM SERPL-SCNC: 139 MMOL/L (ref 135–145)
WBC # BLD AUTO: 7.3 10E3/UL (ref 4–11)

## 2024-11-06 ENCOUNTER — TRANSITIONAL CARE UNIT VISIT (OUTPATIENT)
Dept: GERIATRICS | Facility: CLINIC | Age: 74
End: 2024-11-06
Payer: COMMERCIAL

## 2024-11-06 ENCOUNTER — LAB REQUISITION (OUTPATIENT)
Dept: LAB | Facility: CLINIC | Age: 74
End: 2024-11-06
Payer: COMMERCIAL

## 2024-11-06 VITALS
DIASTOLIC BLOOD PRESSURE: 75 MMHG | SYSTOLIC BLOOD PRESSURE: 119 MMHG | BODY MASS INDEX: 44.1 KG/M2 | HEART RATE: 94 BPM | WEIGHT: 315 LBS | OXYGEN SATURATION: 93 % | HEIGHT: 71 IN | TEMPERATURE: 97.8 F | RESPIRATION RATE: 16 BRPM

## 2024-11-06 DIAGNOSIS — I48.19 OTHER PERSISTENT ATRIAL FIBRILLATION (H): ICD-10-CM

## 2024-11-06 DIAGNOSIS — I50.32 CHRONIC HEART FAILURE WITH PRESERVED EJECTION FRACTION (H): ICD-10-CM

## 2024-11-06 DIAGNOSIS — G47.33 OSA (OBSTRUCTIVE SLEEP APNEA): ICD-10-CM

## 2024-11-06 DIAGNOSIS — N18.32 TYPE 2 DIABETES MELLITUS WITH STAGE 3B CHRONIC KIDNEY DISEASE, WITHOUT LONG-TERM CURRENT USE OF INSULIN (H): ICD-10-CM

## 2024-11-06 DIAGNOSIS — E87.6 HYPOKALEMIA: ICD-10-CM

## 2024-11-06 DIAGNOSIS — R29.6 FALLS FREQUENTLY: Primary | ICD-10-CM

## 2024-11-06 DIAGNOSIS — I48.20 CHRONIC ATRIAL FIBRILLATION (H): ICD-10-CM

## 2024-11-06 DIAGNOSIS — E66.01 MORBID OBESITY (H): ICD-10-CM

## 2024-11-06 DIAGNOSIS — E11.22 TYPE 2 DIABETES MELLITUS WITH STAGE 3B CHRONIC KIDNEY DISEASE, WITHOUT LONG-TERM CURRENT USE OF INSULIN (H): ICD-10-CM

## 2024-11-06 PROCEDURE — 99305 1ST NF CARE MODERATE MDM 35: CPT | Performed by: INTERNAL MEDICINE

## 2024-11-06 NOTE — LETTER
11/6/2024      Clarke Moreira  [address]  [facility] [address]        [facility] GERIATRICS    PRIMARY CARE PROVIDER AND CLINIC:  Henrry Shepard MD, [address] / [facility] [address]  Chief Complaint   Patient presents with     Hospital F/U      [facility] Medical Record Number:  [id number]  Place of Service where encounter took place:  [facility] (U)     Clarke Moreira  is a 74 year old  (1950), admitted to the above facility from  [facility]. Hospital stay 10/30/24 through 11/2/24..     Hospital course was reviewed by me, is as per the hospital discharge summary and NP note    Pt has a history of DMII, CKD, HFpEF, A fib, depression, THONG, newly diagnosed Fe deficiency anemia, hypothyroidism, HLD, and BPH.  He was hospitalized 9/22 through 10/3/2024 at the [facility] for presyncopal episodes thought to be vasovagal.  Course complicated by A-fib with RVR and citrobacter bacteremia.  Was discharged to TCU, and subsequently discharged to home, but had been unable to care for self at home.    He was readmitted after he suffered a mechanical fall at home. Noted to be hypotensive with MICHELLE, both improved with IV fluids.  BC neg. PTA diuretics held, then resumed at lower dose.       Pt states he is feeling stronger, is starting to walk short distances. At baseline, is able to walk up to 100 yards with walker  He notes chronic B knee pain  He denies cough, chest pain, SOB, dizziness         CODE STATUS/ADVANCE DIRECTIVES DISCUSSION:  Prior  DNR      ALLERGIES:   Allergies   Allergen Reactions     Seasonal Allergies       PAST MEDICAL HISTORY:   Past Medical History:   Diagnosis Date     Atrial fibrillation (H)      Basal cell carcinoma      Chronic anticoagulation      Diastolic heart failure (H)      Dyslipidemia      Essential hypertension      Morbid obesity (H)      Sleep-disordered  breathing      Type 2 diabetes mellitus (H)       PAST SURGICAL HISTORY:   has a past surgical history that includes biopsy of skin lesion; Mohs micrographic procedure; and picc insertion (Right, 09/24/2017).  FAMILY HISTORY: family history includes Depression in his mother; Glaucoma in his maternal grandfather.  SOCIAL HISTORY:   reports that he has never smoked. He has never used smokeless tobacco. He reports that he does not drink alcohol and does not use drugs.  Patient's living condition: lives alone    Current medications were reviewed by me today.    Current Outpatient Medications   Medication Sig Dispense Refill     acetaminophen (TYLENOL) 325 MG tablet Take 2 tablets (650 mg) by mouth every 4 hours as needed for mild pain or other (and adjunct with moderate or severe pain or per patient request).       artificial saliva (BIOTENE MT) SOLN solution Take 1 spray by mouth 4 times daily as needed for dry mouth.       aspirin (ASA) 81 MG chewable tablet Take 1 tablet (81 mg) by mouth daily. 200 tablet 2     atorvastatin (LIPITOR) 40 MG tablet Take 1 tablet (40 mg) by mouth daily 90 tablet 3     CERTAVITE/ANTIOXIDANTS tablet TAKE ONE TABLET BY MOUTH ONE TIME DAILY 100 tablet 0     chlorhexidine (PERIDEX) 0.12 % solution Swish and spit 15 mLs in mouth 2 times daily.       ferrous sulfate (FEROSUL) 325 (65 Fe) MG tablet Take 1 tablet (325 mg) by mouth every other day. For iron deficiency anemia 60 tablet 1     levothyroxine (SYNTHROID/LEVOTHROID) 175 MCG tablet Take 1 tablet (175 mcg) by mouth every morning (before breakfast) 90 tablet 3     metFORMIN (GLUCOPHAGE) 500 MG tablet Take 1 tablet (500 mg) by mouth 2 times daily (with meals). 180 tablet 3     metoprolol succinate ER (TOPROL XL) 100 MG 24 hr tablet Take 1 tablet (100 mg) by mouth daily.       Omega-3 Fatty Acids (EQL FISH OIL) 1000 MG CAPS Take 1 capsule by mouth daily 90 capsule 0     order for DME Equipment being ordered: Diabetes Shoes 1 Units 0      order for DME Equipment being ordered: Custom diabetic shoes 1 Units 0     order for DME Equipment being ordered: Bariatric Lift chair  Diagnosis - morbid obesity, CHF, Lymphedema    Fax to Ne from [employer] at [phone]. 1 Units 0     order for DME Equipment being ordered: Other: Velcro Compression stockings.   Treatment Diagnosis: bilateral lymphedema. 2 each 0     potassium chloride chen ER (KLOR-CON M20) 20 MEQ CR tablet Take 1 tablet (20 mEq) by mouth 2 times daily.       senna-docusate (SENEXON-S) 8.6-50 MG tablet Take 1 to 2 tablets by mouth 2 times daily as needed for constipation 180 tablet 0     spironolactone (ALDACTONE) 50 MG tablet Take 1 tablet (50 mg) by mouth daily.       tamsulosin (FLOMAX) 0.4 MG capsule Take 1 capsule (0.4 mg) by mouth daily 90 capsule 3     Torsemide 40 MG TABS Take 80 mg by mouth daily.       urea (CARMOL) 10 % external lotion Apply topically 2 times daily as needed for dry skin.       VITAMIN A PO Take 1 tablet by mouth daily.       vitamin B complex with vitamin C (VITAMIN  B COMPLEX) tablet Take 1 tablet by mouth daily 100 tablet 3     vitamin C (ASCORBIC ACID) 1000 MG TABS Take 1,000 mg by mouth daily       Vitamin D, Cholecalciferol, 25 MCG (1000 UT) CAPS Take 1 capsule by mouth daily.       vitamin E (VITAMIN E COMPLEX) 1000 units (450 mg) capsule Take 1 capsule (1,000 Units) by mouth daily 100 capsule 3     warfarin ANTICOAGULANT (COUMADIN) 5 MG tablet Take 5 mg (5 mg x 1) every Thu; 10 mg (5 mg x 2) all other days or as directed by the INR clinic. (Patient taking differently: Patient takes 10 mg (5 mg x 2) everyday or as directed by the INR clinic.) 180 tablet 3     No current facility-administered medications for this visit.       ROS:  10 point ROS of systems including Constitutional, Eyes, Respiratory, Cardiovascular, Gastroenterology, Genitourinary, Integumentary, Musculoskeletal, Psychiatric were all negative except for pertinent positives noted in my  "HPI.    Vitals:  /75   Pulse 94   Temp 97.8  F (36.6  C)   Resp 16   Ht 1.803 m (5' 11\")   Wt 148.5 kg (327 lb 6.4 oz)   SpO2 93%   BMI 45.66 kg/m    Exam:  Obese male, lying in bed, in NAD  HEENT oral mucosa moist  Lungs clear  CV irreg, HR 80s  Abd soft obese  LE trace edema B  Compressive stockings in place  NEURO fully oriented, no focal weakness, no tremors  Gait was not assessed    Lab/Diagnostic data:  Most Recent 3 CBC's:  Recent Labs   Lab Test 11/05/24  0517 10/28/24  0612 10/27/24  0706   WBC 7.3 8.3 8.9   HGB 11.5* 11.5* 11.1*   MCV 76* 78 77*    279 278     Most Recent 3 BMP's:  Recent Labs   Lab Test 11/05/24 0517 11/02/24  0744 11/01/24  1723 10/28/24  0645 10/28/24  0612 10/27/24  1046 10/27/24  0706     --   --   --  136  --  135   POTASSIUM 2.8*  --   --   --  3.8  --  3.6   CHLORIDE 95*  --   --   --  101  --  97*   CO2 32*  --   --   --  25  --  29   BUN 14.8  --   --   --  10.7  --  11.5   CR 0.97  --   --   --  0.90  --  1.06   ANIONGAP 12  --   --   --  10  --  9   CHERI 8.7*  --   --   --  9.5  --  9.0   * 135* 138*   < > 125*   < > 129*    < > = values in this interval not displayed.     Most Recent 2 LFT's:  Recent Labs   Lab Test 10/25/24  0454 09/23/24  0638   AST 27 45   ALT 12 17   ALKPHOS 119 97   BILITOTAL 0.7 0.7     Most Recent TSH and T4:  Recent Labs   Lab Test 10/07/24  1149 03/12/19  0625 12/11/18  1125   TSH 0.93   < > 11.20*   T4  --   --  1.21    < > = values in this interval not displayed.     Most Recent Hemoglobin A1c:  Recent Labs   Lab Test 10/07/24  1149   A1C 6.2*     Most Recent Anemia Panel:  Recent Labs   Lab Test 11/05/24  0517 09/23/24  0638 09/22/24  2147 09/22/24  2049   WBC 7.3   < >  --   --    HGB 11.5*   < >  --   --    HCT 37.4*   < >  --   --    MCV 76*   < >  --   --       < >  --   --    IRON  --   --   --  14*   IRONSAT  --   --   --  3*   FEB  --   --   --  428   EDE  --   --  28*  --     < > = values in this " interval not displayed.       ASSESSMENT/PLAN:    Recent falls, hypotension, likely secondary to poor intake, diuretic therapy and LE pain, weakness   Plan therapies, monitor vitals. Topic NSAID for pain    Recent citrobacter sepsis  Source unclear  Most recent BC NG  TTE echo 10/25/24 no significant valve abnormalities  Plan monitor vitals, recheck BC if fever or hemodynamic instability     HFpEF  Hypertensive heart disease   PTA diuretic regimen has been resumed at time of hospital discharge  Plan monitor wt, exam, BMP  Diuretics may need to be reduced       Paroxysmal A fib  Long term anticoagulation   HR controlled  Plan continue Metoprolol, warfarin         Chronic hypercapnic respiratory failure   THONG  --continue with CPAP.     CKD III:   baseline Cr around 1. Elevated to 1.77 on hospital admission.  Plan monitor BMP    T2DM with neuropathy  obesity   A1c 6.2 10/7. Body mass index is 45.82 kg/m .  -  Metformin 500 mg bid     HLD:  - Continue PTA ASA and statin     Depression  anxiety  On no tx  ACP to follow.     Chronic microcytic anemia  baseline  hgb 10.5-12. Fe studies c/w Fe deficiency  -Continue iron supplement daily  - out patient  GI work up       Hypothyroidism:   TSH 0.93 10/7/24  -- Continue synthroid      BPH  --Continue Flomax         Luis Nova MD      Sincerely,        Luis Nova MD

## 2024-11-06 NOTE — PROGRESS NOTES
[facility] GERIATRICS    PRIMARY CARE PROVIDER AND CLINIC:  Henrry Shepard MD, [address] [place] [address] / [facility] [id number]  Chief Complaint   Patient presents with    Hospital F/U      [facility] [place] Medical Record Number:  [id number]  Place of Service where encounter took place:  [facility] ([facility])     Clarke Moreira  is a 74 year old  (1950), admitted to the above facility from  [facility]. Hospital stay 10/30/24 through 11/2/24..     Hospital course was reviewed by me, is as per the hospital discharge summary and NP note    Pt has a history of DMII, CKD, HFpEF, A fib, depression, THONG, newly diagnosed Fe deficiency anemia, hypothyroidism, HLD, and BPH.  He was hospitalized 9/22 through 10/3/2024 at the [facility] for presyncopal episodes thought to be vasovagal.  Course complicated by A-fib with RVR and citrobacter bacteremia.  Was discharged to TCU, and subsequently discharged to home, but had been unable to care for self at home.    He was readmitted after he suffered a mechanical fall at home. Noted to be hypotensive with MICHELLE, both improved with IV fluids.  BC neg. PTA diuretics held, then resumed at lower dose.       Pt states he is feeling stronger, is starting to walk short distances. At baseline, is able to walk up to 100 yards with walker  He notes chronic B knee pain  He denies cough, chest pain, SOB, dizziness         CODE STATUS/ADVANCE DIRECTIVES DISCUSSION:  Prior  DNR      ALLERGIES:   Allergies   Allergen Reactions    Seasonal Allergies       PAST MEDICAL HISTORY:   Past Medical History:   Diagnosis Date    Atrial fibrillation (H)     Basal cell carcinoma     Chronic anticoagulation     Diastolic heart failure (H)     Dyslipidemia     Essential hypertension     Morbid obesity (H)     Sleep-disordered breathing     Type 2 diabetes mellitus (H)       PAST SURGICAL HISTORY:   has a past surgical history  that includes biopsy of skin lesion; Mohs micrographic procedure; and picc insertion (Right, 09/24/2017).  FAMILY HISTORY: family history includes Depression in his mother; Glaucoma in his maternal grandfather.  SOCIAL HISTORY:   reports that he has never smoked. He has never used smokeless tobacco. He reports that he does not drink alcohol and does not use drugs.  Patient's living condition: lives alone    Current medications were reviewed by me today.    Current Outpatient Medications   Medication Sig Dispense Refill    acetaminophen (TYLENOL) 325 MG tablet Take 2 tablets (650 mg) by mouth every 4 hours as needed for mild pain or other (and adjunct with moderate or severe pain or per patient request).      artificial saliva (BIOTENE MT) SOLN solution Take 1 spray by mouth 4 times daily as needed for dry mouth.      aspirin (ASA) 81 MG chewable tablet Take 1 tablet (81 mg) by mouth daily. 200 tablet 2    atorvastatin (LIPITOR) 40 MG tablet Take 1 tablet (40 mg) by mouth daily 90 tablet 3    CERTAVITE/ANTIOXIDANTS tablet TAKE ONE TABLET BY MOUTH ONE TIME DAILY 100 tablet 0    chlorhexidine (PERIDEX) 0.12 % solution Swish and spit 15 mLs in mouth 2 times daily.      ferrous sulfate (FEROSUL) 325 (65 Fe) MG tablet Take 1 tablet (325 mg) by mouth every other day. For iron deficiency anemia 60 tablet 1    levothyroxine (SYNTHROID/LEVOTHROID) 175 MCG tablet Take 1 tablet (175 mcg) by mouth every morning (before breakfast) 90 tablet 3    metFORMIN (GLUCOPHAGE) 500 MG tablet Take 1 tablet (500 mg) by mouth 2 times daily (with meals). 180 tablet 3    metoprolol succinate ER (TOPROL XL) 100 MG 24 hr tablet Take 1 tablet (100 mg) by mouth daily.      Omega-3 Fatty Acids (EQL FISH OIL) 1000 MG CAPS Take 1 capsule by mouth daily 90 capsule 0    order for DME Equipment being ordered: Diabetes Shoes 1 Units 0    order for DME Equipment being ordered: Custom diabetic shoes 1 Units 0    order for DME Equipment being ordered:  "Bariatric Lift chair  Diagnosis - morbid obesity, CHF, Lymphedema    Fax to Ne from [employer] at [phone]. 1 Units 0    order for DME Equipment being ordered: Other: Velcro Compression stockings.   Treatment Diagnosis: bilateral lymphedema. 2 each 0    potassium chloride chen ER (KLOR-CON M20) 20 MEQ CR tablet Take 1 tablet (20 mEq) by mouth 2 times daily.      senna-docusate (SENEXON-S) 8.6-50 MG tablet Take 1 to 2 tablets by mouth 2 times daily as needed for constipation 180 tablet 0    spironolactone (ALDACTONE) 50 MG tablet Take 1 tablet (50 mg) by mouth daily.      tamsulosin (FLOMAX) 0.4 MG capsule Take 1 capsule (0.4 mg) by mouth daily 90 capsule 3    Torsemide 40 MG TABS Take 80 mg by mouth daily.      urea (CARMOL) 10 % external lotion Apply topically 2 times daily as needed for dry skin.      VITAMIN A PO Take 1 tablet by mouth daily.      vitamin B complex with vitamin C (VITAMIN  B COMPLEX) tablet Take 1 tablet by mouth daily 100 tablet 3    vitamin C (ASCORBIC ACID) 1000 MG TABS Take 1,000 mg by mouth daily      Vitamin D, Cholecalciferol, 25 MCG (1000 UT) CAPS Take 1 capsule by mouth daily.      vitamin E (VITAMIN E COMPLEX) 1000 units (450 mg) capsule Take 1 capsule (1,000 Units) by mouth daily 100 capsule 3    warfarin ANTICOAGULANT (COUMADIN) 5 MG tablet Take 5 mg (5 mg x 1) every Thu; 10 mg (5 mg x 2) all other days or as directed by the INR clinic. (Patient taking differently: Patient takes 10 mg (5 mg x 2) everyday or as directed by the INR clinic.) 180 tablet 3     No current facility-administered medications for this visit.       ROS:  10 point ROS of systems including Constitutional, Eyes, Respiratory, Cardiovascular, Gastroenterology, Genitourinary, Integumentary, Musculoskeletal, Psychiatric were all negative except for pertinent positives noted in my HPI.    Vitals:  /75   Pulse 94   Temp 97.8  F (36.6  C)   Resp 16   Ht 1.803 m (5' 11\")   Wt 148.5 kg (327 lb 6.4 oz)   " SpO2 93%   BMI 45.66 kg/m    Exam:  Obese male, lying in bed, in NAD  HEENT oral mucosa moist  Lungs clear  CV irreg, HR 80s  Abd soft obese  LE trace edema B  Compressive stockings in place  NEURO fully oriented, no focal weakness, no tremors  Gait was not assessed    Lab/Diagnostic data:  Most Recent 3 CBC's:  Recent Labs   Lab Test 11/05/24  0517 10/28/24  0612 10/27/24  0706   WBC 7.3 8.3 8.9   HGB 11.5* 11.5* 11.1*   MCV 76* 78 77*    279 278     Most Recent 3 BMP's:  Recent Labs   Lab Test 11/05/24  0517 11/02/24  0744 11/01/24  1723 10/28/24  0645 10/28/24  0612 10/27/24  1046 10/27/24  0706     --   --   --  136  --  135   POTASSIUM 2.8*  --   --   --  3.8  --  3.6   CHLORIDE 95*  --   --   --  101  --  97*   CO2 32*  --   --   --  25  --  29   BUN 14.8  --   --   --  10.7  --  11.5   CR 0.97  --   --   --  0.90  --  1.06   ANIONGAP 12  --   --   --  10  --  9   CHERI 8.7*  --   --   --  9.5  --  9.0   * 135* 138*   < > 125*   < > 129*    < > = values in this interval not displayed.     Most Recent 2 LFT's:  Recent Labs   Lab Test 10/25/24  0454 09/23/24  0638   AST 27 45   ALT 12 17   ALKPHOS 119 97   BILITOTAL 0.7 0.7     Most Recent TSH and T4:  Recent Labs   Lab Test 10/07/24  1149 03/12/19  0625 12/11/18  1125   TSH 0.93   < > 11.20*   T4  --   --  1.21    < > = values in this interval not displayed.     Most Recent Hemoglobin A1c:  Recent Labs   Lab Test 10/07/24  1149   A1C 6.2*     Most Recent Anemia Panel:  Recent Labs   Lab Test 11/05/24  0517 09/23/24  0638 09/22/24  2147 09/22/24  2049   WBC 7.3   < >  --   --    HGB 11.5*   < >  --   --    HCT 37.4*   < >  --   --    MCV 76*   < >  --   --       < >  --   --    IRON  --   --   --  14*   IRONSAT  --   --   --  3*   FEB  --   --   --  428   EDE  --   --  28*  --     < > = values in this interval not displayed.       ASSESSMENT/PLAN:    Recent falls, hypotension, likely secondary to poor intake, diuretic therapy and LE  pain, weakness   Plan therapies, monitor vitals. Topic NSAID for pain    Recent citrobacter sepsis  Source unclear  Most recent BC NG  TTE echo 10/25/24 no significant valve abnormalities  Plan monitor vitals, recheck BC if fever or hemodynamic instability     HFpEF  Hypertensive heart disease   PTA diuretic regimen has been resumed at time of hospital discharge  Plan monitor wt, exam, BMP  Diuretics may need to be reduced       Paroxysmal A fib  Long term anticoagulation   HR controlled  Plan continue Metoprolol, warfarin         Chronic hypercapnic respiratory failure   THONG  --continue with CPAP.     CKD III:   baseline Cr around 1. Elevated to 1.77 on hospital admission.  Plan monitor BMP    T2DM with neuropathy  obesity   A1c 6.2 10/7. Body mass index is 45.82 kg/m .  -  Metformin 500 mg bid     HLD:  - Continue PTA ASA and statin     Depression  anxiety  On no tx  ACP to follow.     Chronic microcytic anemia  baseline  hgb 10.5-12. Fe studies c/w Fe deficiency  -Continue iron supplement daily  - out patient  GI work up       Hypothyroidism:   TSH 0.93 10/7/24  -- Continue synthroid      BPH  --Continue Flomax         Luis Nova MD

## 2024-11-06 NOTE — PROGRESS NOTES
Injection  Injection performed in ld (for both TDAP and FLU) by Margarita Calderon MA. Patient tolerated the procedure well without complications.  11/06/24   Margarita Calderon MA     S: order received to see patient at [facility] for an eval for an offloading shoes as ordered   O/G: offload ulcerated area on patient's right foot  A: patient seen for an evaluation.  Patient has ulceration(s) on their right foot, plantar and lateral heel.  Pegs were removed under ulcerated area.  Patient was fit with a size large DH2 offloading shoe for the right.  Patient was also fit with a left DH2 shoe, nadir XL.  P: patient to contact us if any issues arise

## 2024-11-07 ENCOUNTER — TELEPHONE (OUTPATIENT)
Dept: GERIATRICS | Facility: CLINIC | Age: 74
End: 2024-11-07

## 2024-11-07 ENCOUNTER — TRANSITIONAL CARE UNIT VISIT (OUTPATIENT)
Dept: GERIATRICS | Facility: CLINIC | Age: 74
End: 2024-11-07
Payer: COMMERCIAL

## 2024-11-07 VITALS
TEMPERATURE: 97.8 F | WEIGHT: 315 LBS | OXYGEN SATURATION: 94 % | BODY MASS INDEX: 44.1 KG/M2 | HEART RATE: 89 BPM | DIASTOLIC BLOOD PRESSURE: 68 MMHG | RESPIRATION RATE: 16 BRPM | HEIGHT: 71 IN | SYSTOLIC BLOOD PRESSURE: 113 MMHG

## 2024-11-07 DIAGNOSIS — G89.29 CHRONIC PAIN OF BOTH KNEES: ICD-10-CM

## 2024-11-07 DIAGNOSIS — N18.32 TYPE 2 DIABETES MELLITUS WITH STAGE 3B CHRONIC KIDNEY DISEASE, WITHOUT LONG-TERM CURRENT USE OF INSULIN (H): ICD-10-CM

## 2024-11-07 DIAGNOSIS — I11.0 HYPERTENSIVE HEART DISEASE WITH CHRONIC DIASTOLIC CONGESTIVE HEART FAILURE (H): ICD-10-CM

## 2024-11-07 DIAGNOSIS — R53.81 PHYSICAL DECONDITIONING: ICD-10-CM

## 2024-11-07 DIAGNOSIS — E11.22 TYPE 2 DIABETES MELLITUS WITH STAGE 3B CHRONIC KIDNEY DISEASE, WITHOUT LONG-TERM CURRENT USE OF INSULIN (H): ICD-10-CM

## 2024-11-07 DIAGNOSIS — I50.32 CHRONIC HEART FAILURE WITH PRESERVED EJECTION FRACTION (H): ICD-10-CM

## 2024-11-07 DIAGNOSIS — M25.562 CHRONIC PAIN OF BOTH KNEES: ICD-10-CM

## 2024-11-07 DIAGNOSIS — G47.33 OSA (OBSTRUCTIVE SLEEP APNEA): ICD-10-CM

## 2024-11-07 DIAGNOSIS — I48.20 CHRONIC ATRIAL FIBRILLATION (H): Primary | ICD-10-CM

## 2024-11-07 DIAGNOSIS — I50.32 HYPERTENSIVE HEART DISEASE WITH CHRONIC DIASTOLIC CONGESTIVE HEART FAILURE (H): ICD-10-CM

## 2024-11-07 DIAGNOSIS — M25.561 CHRONIC PAIN OF BOTH KNEES: ICD-10-CM

## 2024-11-07 LAB
INR PPP: 3.09 (ref 0.85–1.15)
POTASSIUM SERPL-SCNC: 3 MMOL/L (ref 3.4–5.3)

## 2024-11-07 PROCEDURE — 36415 COLL VENOUS BLD VENIPUNCTURE: CPT | Mod: ORL | Performed by: NURSE PRACTITIONER

## 2024-11-07 PROCEDURE — 84132 ASSAY OF SERUM POTASSIUM: CPT | Mod: ORL | Performed by: NURSE PRACTITIONER

## 2024-11-07 PROCEDURE — 99309 SBSQ NF CARE MODERATE MDM 30: CPT | Performed by: NURSE PRACTITIONER

## 2024-11-07 PROCEDURE — P9604 ONE-WAY ALLOW PRORATED TRIP: HCPCS | Mod: ORL | Performed by: NURSE PRACTITIONER

## 2024-11-07 PROCEDURE — 85610 PROTHROMBIN TIME: CPT | Mod: ORL | Performed by: NURSE PRACTITIONER

## 2024-11-07 NOTE — TELEPHONE ENCOUNTER
"Provider: REAGAN Ugarte CNP   Facility: [facility]   Facility Type:  TCU    Caller: Alycia  Call Back Number: [phone]  Reason for call: INR  Diagnosis/Goal: A. Fib  Heparin/Lovenox:  No  Currently on ABX?: No  Other interacting medication:  ASA, Vitamin E  Missed or refused doses: No    Date INR Previous Dose/Orders             11/4/24 2.72 Take 5mg Q Thurs and 10mg AOD.  Next INR 11/7/24 11/7/24 3.09      Prior to hospitalization dose:  10mg daily      Patient also had a potassium level drawn today of 3.0.  Patient has been on 40meq Q PM on 11/5 and 11/6 and 20meq BID, however starting 11/7, patient will be on potassium 40meq Q AM and 20meq Q HS.  Other notable meds:  Spironolactone 50mg daily and Torsemide 80mg daily      Telephone encounter sent to: REAGAN Ugarte CNP     Requesting orders for Warfarin dosing and date of next INR draw  Please respond to \"Geriatrics Nurse Pool\".    Juan Pablo Ashford RN   "

## 2024-11-07 NOTE — LETTER
" 11/7/2024      Clarke Moreira  [address]  [place] [address]        [facility] GERIATRICS    Chief Complaint   Patient presents with     RECHECK     HPI:  Clarke Moreira is a 74 year old  (1950), who is being seen today for an episodic care visit at: [facility] ([facility]) [[id number]]. Today's concern is:     Clarke was visited today while in his room sitting up at the edge of the bed.  When asked how he is doing he answered \"I am getting sick of that question.\".  Asked for provider to does listen to his lungs and for visit to be over as he is going to be working with therapy.  He does not feel short of breath.  Ate breakfast this morning.  Has been sleeping well.    Allergies, and PMH/PSH reviewed in EPIC today.  REVIEW OF SYSTEMS:  4 point ROS including Respiratory, CV, GI and , other than that noted in the HPI,  is negative    Objective:   /68   Pulse 89   Temp 97.8  F (36.6  C)   Resp 16   Ht 1.803 m (5' 11\")   Wt (!) 157.2 kg (346 lb 9.6 oz)   SpO2 94%   BMI 48.34 kg/m    GENERAL APPEARANCE:  Alert, in no distress, pleasant, cooperative, oriented x 4  EYES: no discharge or mattering on lids or lashes noted  ENT:  moist mucous membranes, hearing acuity intact  NECK: supple, symmetrical  RESP: no respiratory distress, Lung sounds clear, patient is on room air  CV:  rate and rhythm regular, no murmur. Edema trace in bilateral lower extremities. VASCULAR: warm extremities without open areas.  ABDOMEN: obese, normal bowel sounds, soft, nontender.  M/S:   Gait and station: unsafe without assistance. no tenderness or swelling of the joints; able to move all extremities   SKIN:  Inspection and palpation of skin and subcutaneous tissue: skin warm, dry and intact without rashes  NEURO: no facial asymmetry, no speech deficits and able to follow directions, moves all extremities symmetrically  PSYCH:  insight and judgement intact, memory intact, affect and mood " normal    CBC RESULTS:   Recent Labs   Lab Test 11/05/24  0517 10/28/24  0612   WBC 7.3 8.3   RBC 4.94 4.87   HGB 11.5* 11.5*   HCT 37.4* 37.9*   MCV 76* 78   MCH 23.3* 23.6*   MCHC 30.7* 30.3*   RDW 17.2* 17.2*    279       Last Basic Metabolic Panel:  Recent Labs   Lab Test 11/07/24  0525 11/05/24 0517 11/02/24  0744 10/28/24  0645 10/28/24  0612   NA  --  139  --   --  136   POTASSIUM 3.0* 2.8*  --   --  3.8   CHLORIDE  --  95*  --   --  101   CHERI  --  8.7*  --   --  9.5   CO2  --  32*  --   --  25   BUN  --  14.8  --   --  10.7   CR  --  0.97  --   --  0.90   GLC  --  117* 135*   < > 125*    < > = values in this interval not displayed.       Liver Function Studies -   Recent Labs   Lab Test 10/25/24  0454 09/23/24  0638   PROTTOTAL 7.1 6.6   ALBUMIN 3.6 3.6   BILITOTAL 0.7 0.7   ALKPHOS 119 97   AST 27 45   ALT 12 17       TSH   Date Value Ref Range Status   10/07/2024 0.93 0.30 - 4.20 uIU/mL Final   09/22/2024 0.79 0.30 - 4.20 uIU/mL Final   05/09/2022 3.98 0.40 - 4.00 mU/L Final   11/09/2020 2.69 0.40 - 4.00 mU/L Final   11/05/2019 0.75 0.40 - 4.00 mU/L Final   ]    Lab Results   Component Value Date    A1C 6.2 10/07/2024    A1C 6.5 09/26/2024    A1C 6.2 05/06/2021    A1C 5.9 11/09/2020         Assessment/Plan:  Lower back pain    Bilateral knee hip pain   Has bilateral knee pain.  Did have several falls at home due to his knees buckling.  He is not interested in steroid injections but would like to try Voltaren gel.  -- Start Voltaren gel 1%.  Apply 2 g to bilateral knees 3 times daily.  -- Ongoing PT OT for strengthening    HFpEF  Hypertensive heart disease   Echo  10/25:  EF 55-60%.  Spironolactone was held during hospitalization and torsemide was decreased from 80 mg down to 40 but back to home doses at discharge. K+ has been low on bloodwork here.   --Continue with spironolactone 50 mg daily  --Torsemide decrease to 60 mg daily.   -- increase potassium chloride to 40 mg BID  --Continue to monitor  blood pressure and heart rate, adjust medications as needed.     Paroxysmal A fib  Long term anticoagulation   CHADSVASC 5. INR 2.9 today HR 70-90s  -- Coumadin 5 mg Thursday and 10 mg all other days and recheck INR on 11/11.  -- Continue with metoprolol  mg daily.      Chronic hypercapnic respiratory failure   THONG  --continue with CPAP.     CKD III:   baseline Cr around 1. Elevated to 1.77 on hospital admission. Last creatinine of 0.90 on 10/28.  --Avoid nephrotoxic medications. Recheck BMP periodically throughout the stay.      T2DM with neuropathy  obesity   A1c 6.2 10/7. Body mass index is 45.82 kg/m .  -  Metformin 500 mg bid     HLD:  - Continue PTA ASA and statin     Depression  anxiety  Was feeling depressed during hospitalization.  Seen by psych.  He seems more optimistic upon admission to the TCU but is irriatble today.   -- ACP to follow here at the TCU.    Chronic microcytic anemia  baseline  hgb 10.5-12. Iron low to 14 9/22/2024.   -Continue iron supplement daily  - may need out patient  GI work up       Hypothyroidism:   TSH 0.93 10/7/24  -- Continue synthroid      BPH  --Continue Flomax     Physical deconditioning  Secondary to recent hospitalization and underlying medical conditions.   --ongoing PT/OT for strengthening.   -- Social work for discharge planning    MED REC REQUIRED  Post Medication Reconciliation Status: discharge medications reconciled, continue medications without change      Electronically signed by: REAGAN Crandall CNP         Sincerely,        REAGAN Crandall CNP

## 2024-11-07 NOTE — PROGRESS NOTES
"[facility] GERIATRICS    Chief Complaint   Patient presents with    RECHECK     HPI:  Clarke Moreira is a 74 year old  (1950), who is being seen today for an episodic care visit at: [facility] ([facility]) [[id number]]. Today's concern is:     Clarke was visited today while in his room sitting up at the edge of the bed.  When asked how he is doing he answered \"I am getting sick of that question.\".  Asked for provider to does listen to his lungs and for visit to be over as he is going to be working with therapy.  He does not feel short of breath.  Ate breakfast this morning.  Has been sleeping well.    Allergies, and PMH/PSH reviewed in EPIC today.  REVIEW OF SYSTEMS:  4 point ROS including Respiratory, CV, GI and , other than that noted in the HPI,  is negative    Objective:   /68   Pulse 89   Temp 97.8  F (36.6  C)   Resp 16   Ht 1.803 m (5' 11\")   Wt (!) 157.2 kg (346 lb 9.6 oz)   SpO2 94%   BMI 48.34 kg/m    GENERAL APPEARANCE:  Alert, in no distress, pleasant, cooperative, oriented x 4  EYES: no discharge or mattering on lids or lashes noted  ENT:  moist mucous membranes, hearing acuity intact  NECK: supple, symmetrical  RESP: no respiratory distress, Lung sounds clear, patient is on room air  CV:  rate and rhythm regular, no murmur. Edema trace in bilateral lower extremities. VASCULAR: warm extremities without open areas.  ABDOMEN: obese, normal bowel sounds, soft, nontender.  M/S:   Gait and station: unsafe without assistance. no tenderness or swelling of the joints; able to move all extremities   SKIN:  Inspection and palpation of skin and subcutaneous tissue: skin warm, dry and intact without rashes  NEURO: no facial asymmetry, no speech deficits and able to follow directions, moves all extremities symmetrically  PSYCH:  insight and judgement intact, memory intact, affect and mood normal    CBC RESULTS:   Recent Labs   Lab Test 11/05/24  0517 10/28/24  0612   WBC 7.3 8.3 "   RBC 4.94 4.87   HGB 11.5* 11.5*   HCT 37.4* 37.9*   MCV 76* 78   MCH 23.3* 23.6*   MCHC 30.7* 30.3*   RDW 17.2* 17.2*    279       Last Basic Metabolic Panel:  Recent Labs   Lab Test 11/07/24  0525 11/05/24  0517 11/02/24  0744 10/28/24  0645 10/28/24  0612   NA  --  139  --   --  136   POTASSIUM 3.0* 2.8*  --   --  3.8   CHLORIDE  --  95*  --   --  101   CHERI  --  8.7*  --   --  9.5   CO2  --  32*  --   --  25   BUN  --  14.8  --   --  10.7   CR  --  0.97  --   --  0.90   GLC  --  117* 135*   < > 125*    < > = values in this interval not displayed.       Liver Function Studies -   Recent Labs   Lab Test 10/25/24  0454 09/23/24  0638   PROTTOTAL 7.1 6.6   ALBUMIN 3.6 3.6   BILITOTAL 0.7 0.7   ALKPHOS 119 97   AST 27 45   ALT 12 17       TSH   Date Value Ref Range Status   10/07/2024 0.93 0.30 - 4.20 uIU/mL Final   09/22/2024 0.79 0.30 - 4.20 uIU/mL Final   05/09/2022 3.98 0.40 - 4.00 mU/L Final   11/09/2020 2.69 0.40 - 4.00 mU/L Final   11/05/2019 0.75 0.40 - 4.00 mU/L Final   ]    Lab Results   Component Value Date    A1C 6.2 10/07/2024    A1C 6.5 09/26/2024    A1C 6.2 05/06/2021    A1C 5.9 11/09/2020         Assessment/Plan:  Lower back pain    Bilateral knee hip pain   Has bilateral knee pain.  Did have several falls at home due to his knees buckling.  He is not interested in steroid injections but would like to try Voltaren gel.  -- Start Voltaren gel 1%.  Apply 2 g to bilateral knees 3 times daily.  -- Ongoing PT OT for strengthening    HFpEF  Hypertensive heart disease   Echo  10/25:  EF 55-60%.  Spironolactone was held during hospitalization and torsemide was decreased from 80 mg down to 40 but back to home doses at discharge. K+ has been low on bloodwork here.   --Continue with spironolactone 50 mg daily  --Torsemide decrease to 60 mg daily.   -- increase potassium chloride to 40 mg BID  --Continue to monitor blood pressure and heart rate, adjust medications as needed.     Paroxysmal A fib  Long term  anticoagulation   CHADSVASC 5. INR 2.9 today HR 70-90s  -- Coumadin 5 mg Thursday and 10 mg all other days and recheck INR on 11/11.  -- Continue with metoprolol  mg daily.      Chronic hypercapnic respiratory failure   THONG  --continue with CPAP.     CKD III:   baseline Cr around 1. Elevated to 1.77 on hospital admission. Last creatinine of 0.90 on 10/28.  --Avoid nephrotoxic medications. Recheck BMP periodically throughout the stay.      T2DM with neuropathy  obesity   A1c 6.2 10/7. Body mass index is 45.82 kg/m .  -  Metformin 500 mg bid     HLD:  - Continue PTA ASA and statin     Depression  anxiety  Was feeling depressed during hospitalization.  Seen by psych.  He seems more optimistic upon admission to the TCU but is irriatble today.   -- ACP to follow here at the TCU.    Chronic microcytic anemia  baseline  hgb 10.5-12. Iron low to 14 9/22/2024.   -Continue iron supplement daily  - may need out patient  GI work up       Hypothyroidism:   TSH 0.93 10/7/24  -- Continue synthroid      BPH  --Continue Flomax     Physical deconditioning  Secondary to recent hospitalization and underlying medical conditions.   --ongoing PT/OT for strengthening.   -- Social work for discharge planning    MED REC REQUIRED  Post Medication Reconciliation Status: discharge medications reconciled, continue medications without change      Electronically signed by: REAGAN Crandall CNP

## 2024-11-11 ENCOUNTER — VIRTUAL VISIT (OUTPATIENT)
Dept: PSYCHOLOGY | Facility: CLINIC | Age: 74
End: 2024-11-11
Payer: COMMERCIAL

## 2024-11-11 ENCOUNTER — LAB REQUISITION (OUTPATIENT)
Dept: LAB | Facility: CLINIC | Age: 74
End: 2024-11-11
Payer: COMMERCIAL

## 2024-11-11 ENCOUNTER — TELEPHONE (OUTPATIENT)
Dept: GERIATRICS | Facility: CLINIC | Age: 74
End: 2024-11-11
Payer: COMMERCIAL

## 2024-11-11 DIAGNOSIS — F34.1 PERSISTENT DEPRESSIVE DISORDER: Primary | ICD-10-CM

## 2024-11-11 DIAGNOSIS — E87.6 HYPOKALEMIA: ICD-10-CM

## 2024-11-11 DIAGNOSIS — F41.1 GAD (GENERALIZED ANXIETY DISORDER): ICD-10-CM

## 2024-11-11 DIAGNOSIS — F33.1 MODERATE EPISODE OF RECURRENT MAJOR DEPRESSIVE DISORDER (H): ICD-10-CM

## 2024-11-11 LAB — INR (EXTERNAL): 3.6 (ref 0.9–1.1)

## 2024-11-11 PROCEDURE — 90834 PSYTX W PT 45 MINUTES: CPT | Mod: 93 | Performed by: PSYCHOLOGIST

## 2024-11-11 RX ORDER — POTASSIUM CHLORIDE 1500 MG/1
40 TABLET, EXTENDED RELEASE ORAL 2 TIMES DAILY
COMMUNITY
Start: 2024-11-11

## 2024-11-11 NOTE — TELEPHONE ENCOUNTER
"Provider: REAGAN Ugarte CNP   Facility: [facility]   Facility Type:  TCU    Caller: Alycia   Call Back Number: [phone]  Reason for call: INR    Diagnosis/Goal: A. Fib  Heparin/Lovenox:  No  Currently on ABX?: No  Other interacting medication:  None  Missed or refused doses: No      Date INR Previous Dose/Orders   11/4/24 2.72 Take 5mg Q Thurs and 10mg AOD.  Next INR 11/7/24 11/7/ 3.09  5mg q Th and Sat and 10mg AOD     11/10 3.6         Telephone encounter sent to: REAGAN Ugarte CNP     Requesting orders for Warfarin dosing and date of next INR draw  Please respond to \"Geriatrics Nurse Pool\".    Manda Fletcher RN   "
Thank you.   Hold coumadin tonight then 3 mg tomorrow. Recheck via fingerstick on Wednesday please.   Thanks  Oma  
Verbal order/direction given to: Alycia Fletcher, RN  
20.86

## 2024-11-11 NOTE — PROGRESS NOTES
Telemedicine Visit: The patient's condition can be safely assessed and treated via an audio only encounter.      Reason for Telemedicine Visit: Patient has requested telehealth visit and Patient unable to travel    Originating Site (Patient Location): Patient's home    Distant Location (provider location):  On-site    Consent:  The patient/guardian has verbally consented to: the potential risks and benefits of telemedicine (video visit) versus in person care; bill my insurance or make self-payment for services provided; and responsibility for payment of non-covered services.     Mode of Communication:  Telephone call via Appointeddity    As the provider I attest to compliance with applicable laws and regulations related to telemedicine.    [facility] Primary Care: : Integrated Behavioral Health  November 11, 2024    Behavioral Health Clinician Progress Note    Patient Name: Clarke Moreira           Service Type:  Individual      Service Location:   Phone call (patient / identified key support person reached)     Session Start Time: 9:06  Session End Time: 9:47      Session Length: 38 - 52      Attendees: Client     Service Modality:  Phone Visit    Visit Activities (Refresh list every visit): [facility] Only    Diagnostic Assessment Date: last one was 4/1/25  Treatment Plan Review Date: last one was 4/1/25  See Flowsheets for today's PHQ-9 and CHRIS-7 results  Previous PHQ-9:       9/13/2023     9:19 AM 3/15/2024     9:48 AM 7/11/2024     9:37 AM   PHQ-9 SCORE   PHQ-9 Total Score MyChart 24 (Severe depression) 19 (Moderately severe depression) 0   PHQ-9 Total Score 24 19 0     Previous CHRIS-7:       12/11/2018    10:31 AM 5/10/2019     8:26 AM 9/3/2019    10:35 AM   CHRIS-7 SCORE   Total Score 20 13 19     Treatment Objective(s) Addressed in This Session:  Goal 1: Clarke will learn/use coping skills to continue to reduce intensity/frequency of suicidal thoughts     Goal 2: Clarke will challenge/reframe  "negative harsh thoughts that he has about himself.     Current Stressors / Issues:  The session today was our first time meeting since mid-September due to Clarke's health. He ended up being hospitalized, sent to a TCU and then discharged home. States he was probably not ready to be back at home and his legs were giving out under him. This resulted in him going by ambulance back to the hospital, where he was placed on observation status and then discharged to a different TCU where he is currently staying.  This appointment was made sometime after the first discharge, spent some time trying to track Clarke down today so we could still have the appointment.  His nurse was gracious to assist with her phone so that we could meet via video today.  Clarke describes that his mood has been up and down throughout this whole process. Clarke struggles with chronic suicidal ideation and did identify a moment where he was really struggling with whether he should continue to try to do all the things needed to recover or just give up. Ultimately decided to keep trying which was surprising and not surprising to him at the same time.  Right now he states he feels like his mood is \"ok.\" Describes himself as feeling \"very pleased\" with his current TCU and the care he is receiving.  Clarke noticed some of his anxious thoughts about the state of his apartment and that this may be a reason that management decides to make him move out has been alleviated by a situation that happened where the buildling manager had to help one of his friends access his apartment to bring Clarke his checkbook.  The manager was very kind about the state of things and didn't share any negative or judgmental statements with Clarke at all.  Clarke was greatly relieved by this and feels more at ease than he has in years.  Clarke does have concerns about going back to his apartment due to cleanliness. Suggested that he talk to his care team there to see " if there are resources they are aware of that could assist with finding someone that could come out and clean it before he returns home.  Discussed how Clarke can advocate for himself and his needs with the care team in an assertive manner, but not irritable manner.      Patients Response to treatment Interventions.  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      Assessment: Current Emotional / Mental Status (status of significant symptoms):  Risk status (Self / Other harm or suicidal ideation)    Patient denies current fears or concerns for personal safety.  Patient denies current or recent suicidal ideation or behaviors.  Patient denies current or recent homicidal ideation or behaviors.  Patient denies current or recent self injurious behavior or ideation.  Patient denies other safety concerns.      Appearance:   Appropriate   Eye Contact:   Good   Psychomotor Behavior: Normal   Attitude:   Attentive  Orientation:   All  Speech   Rate / Production: Normal/ Responsive   Volume:  Soft   Mood:    Normal  Affect:    Appropriate   Thought Content:  Clear   Thought Form:  Coherent  Logical   Insight:    Good     Diagnoses:  Persistent Depressive Disorder  Major Depression, recurrent, moderate  Generalized Anxiety Disorder      Recommendations & Plan:   Follow up scheduled on 12/9    Lety Buenrostro PsyD, JIN

## 2024-11-12 ENCOUNTER — LAB REQUISITION (OUTPATIENT)
Dept: LAB | Facility: CLINIC | Age: 74
End: 2024-11-12
Payer: COMMERCIAL

## 2024-11-12 ENCOUNTER — TRANSITIONAL CARE UNIT VISIT (OUTPATIENT)
Dept: GERIATRICS | Facility: CLINIC | Age: 74
End: 2024-11-12
Payer: COMMERCIAL

## 2024-11-12 VITALS
BODY MASS INDEX: 44.1 KG/M2 | HEART RATE: 72 BPM | OXYGEN SATURATION: 100 % | WEIGHT: 315 LBS | RESPIRATION RATE: 22 BRPM | SYSTOLIC BLOOD PRESSURE: 129 MMHG | TEMPERATURE: 98 F | DIASTOLIC BLOOD PRESSURE: 106 MMHG | HEIGHT: 71 IN

## 2024-11-12 DIAGNOSIS — G89.29 CHRONIC PAIN OF BOTH KNEES: ICD-10-CM

## 2024-11-12 DIAGNOSIS — E11.22 TYPE 2 DIABETES MELLITUS WITH STAGE 3B CHRONIC KIDNEY DISEASE, WITHOUT LONG-TERM CURRENT USE OF INSULIN (H): ICD-10-CM

## 2024-11-12 DIAGNOSIS — R53.81 PHYSICAL DECONDITIONING: ICD-10-CM

## 2024-11-12 DIAGNOSIS — M25.562 CHRONIC PAIN OF BOTH KNEES: ICD-10-CM

## 2024-11-12 DIAGNOSIS — I50.32 CHRONIC HEART FAILURE WITH PRESERVED EJECTION FRACTION (H): ICD-10-CM

## 2024-11-12 DIAGNOSIS — I50.32 HYPERTENSIVE HEART DISEASE WITH CHRONIC DIASTOLIC CONGESTIVE HEART FAILURE (H): ICD-10-CM

## 2024-11-12 DIAGNOSIS — N18.32 TYPE 2 DIABETES MELLITUS WITH STAGE 3B CHRONIC KIDNEY DISEASE, WITHOUT LONG-TERM CURRENT USE OF INSULIN (H): ICD-10-CM

## 2024-11-12 DIAGNOSIS — M25.561 CHRONIC PAIN OF BOTH KNEES: ICD-10-CM

## 2024-11-12 DIAGNOSIS — E87.6 HYPOKALEMIA: ICD-10-CM

## 2024-11-12 DIAGNOSIS — I11.0 HYPERTENSIVE HEART DISEASE WITH CHRONIC DIASTOLIC CONGESTIVE HEART FAILURE (H): ICD-10-CM

## 2024-11-12 DIAGNOSIS — G47.33 OSA (OBSTRUCTIVE SLEEP APNEA): ICD-10-CM

## 2024-11-12 DIAGNOSIS — I48.20 CHRONIC ATRIAL FIBRILLATION (H): ICD-10-CM

## 2024-11-12 DIAGNOSIS — M25.572 ACUTE LEFT ANKLE PAIN: Primary | ICD-10-CM

## 2024-11-12 PROCEDURE — 99309 SBSQ NF CARE MODERATE MDM 30: CPT | Performed by: NURSE PRACTITIONER

## 2024-11-12 NOTE — PROGRESS NOTES
"[facility] GERIATRICS    Chief Complaint   Patient presents with    RECHECK     HPI:  Clarke Moreira is a 74 year old  (1950), who is being seen today for an episodic care visit at: [facility] ([facility]) [[id number]]. Today's concern is:     Clarke was visited today while in his room sitting up in the chair.  He does not feel short of breath.  Ate breakfast this morning.  Has been sleeping well.  Has not noted an increase to lower extremity edema since decreasing water pill but has noted a substantial decrease in the amount that he is urinating.  He is having left ankle pain which is new.  Describes it as shooting at times but when he bears weight on it it is throbbing.    Allergies, and PMH/PSH reviewed in EPIC today.  REVIEW OF SYSTEMS:  4 point ROS including Respiratory, CV, GI and , other than that noted in the HPI,  is negative    Objective:   BP (!) 129/106   Pulse 72   Temp 98  F (36.7  C)   Resp 22   Ht 1.803 m (5' 11\")   Wt (!) 153.3 kg (338 lb)   SpO2 100%   BMI 47.14 kg/m    GENERAL APPEARANCE:  Alert, in no distress, pleasant, cooperative, oriented x 4  EYES: no discharge or mattering on lids or lashes noted  ENT:  moist mucous membranes, hearing acuity intact  NECK: supple, symmetrical  RESP: no respiratory distress, Lung sounds clear, patient is on room air  CV:  rate and rhythm regular, no murmur. Edema trace in bilateral lower extremities, teds are in place. VASCULAR: warm extremities without open areas.  ABDOMEN: obese, normal bowel sounds, soft, nontender.  M/S:   Gait and station: unsafe without assistance. no tenderness or swelling of the joints; able to move all extremities   SKIN:  Inspection and palpation of skin and subcutaneous tissue: skin warm, dry and intact without rashes  NEURO: no facial asymmetry, no speech deficits and able to follow directions, moves all extremities symmetrically  PSYCH:  insight and judgement intact, memory intact, affect and mood " normal    CBC RESULTS:   Recent Labs   Lab Test 11/05/24  0517 10/28/24  0612   WBC 7.3 8.3   RBC 4.94 4.87   HGB 11.5* 11.5*   HCT 37.4* 37.9*   MCV 76* 78   MCH 23.3* 23.6*   MCHC 30.7* 30.3*   RDW 17.2* 17.2*    279       Last Basic Metabolic Panel:  Recent Labs   Lab Test 11/13/24  0506 11/07/24  0525 11/05/24  0517 11/02/24  0744 10/28/24  0645 10/28/24  0612   NA  --   --  139  --   --  136   POTASSIUM 3.5 3.0* 2.8*  --   --  3.8   CHLORIDE  --   --  95*  --   --  101   CHERI  --   --  8.7*  --   --  9.5   CO2  --   --  32*  --   --  25   BUN  --   --  14.8  --   --  10.7   CR  --   --  0.97  --   --  0.90   GLC  --   --  117* 135*   < > 125*    < > = values in this interval not displayed.       Liver Function Studies -   Recent Labs   Lab Test 10/25/24  0454 09/23/24  0638   PROTTOTAL 7.1 6.6   ALBUMIN 3.6 3.6   BILITOTAL 0.7 0.7   ALKPHOS 119 97   AST 27 45   ALT 12 17       TSH   Date Value Ref Range Status   10/07/2024 0.93 0.30 - 4.20 uIU/mL Final   09/22/2024 0.79 0.30 - 4.20 uIU/mL Final   05/09/2022 3.98 0.40 - 4.00 mU/L Final   11/09/2020 2.69 0.40 - 4.00 mU/L Final   11/05/2019 0.75 0.40 - 4.00 mU/L Final   ]    Lab Results   Component Value Date    A1C 6.2 10/07/2024    A1C 6.5 09/26/2024    A1C 6.2 05/06/2021    A1C 5.9 11/09/2020       Assessment/Plan:  Lower back pain    Bilateral knee hip pain   Has bilateral knee pain.  Did have several falls at home due to his knees buckling.  He is not interested in steroid injections but would like to try Voltaren gel which she is unsure if this has been helpful.  Now with new onset of left ankle pain.  How he is describing it wonder if this is neuropathy but will obtain an x-ray given the pain is substantial upon bearing weight.  --Three-view x-ray of the left ankle.  Will have onsite Ortho review imaging and if needed, complete a visit.  -- Continue Voltaren gel 1%.  Apply 2 g to bilateral knees 3 times daily.  -- Ongoing PT OT for strengthening  --  Apply Biofreeze to left ankle twice daily once in the morning and another at at bedtime.    HFpEF  Hypertensive heart disease   Echo  10/25:  EF 55-60%.  Spironolactone was held during hospitalization and torsemide was decreased from 80 mg down to 40 but back to home doses at discharge. K+ has been low on bloodwork here but now in normal range after increases to daily dose.  His torsemide was decreased from 80 mg down to 60 mg and there has not been an increase to his weight or edema so we will continue with the lower dose.  --Continue with spironolactone 50 mg daily  --Torsemide 60 mg daily.   -- Continue potassium chloride to 40 mg BID  --Continue to monitor blood pressure and heart rate, adjust medications as needed.     Paroxysmal A fib  Long term anticoagulation   CHADSVASC 5. INR supratherapuetic yesterday to 3.6. HR 70-90s  -- Coumadin 5 mg Thursday and 10 mg all other days and recheck INR on 11/11.  -- Continue with metoprolol  mg daily.      Chronic hypercapnic respiratory failure   THONG  --continue with CPAP however he does not have it at the TCU and there is currently no way for him to get it from his home.      CKD III:   baseline Cr around 1. Elevated to 1.77 on hospital admission. Last creatinine of 0.90 on 10/28.  --Avoid nephrotoxic medications. Recheck BMP periodically throughout the stay.      T2DM with neuropathy  obesity   A1c 6.2 10/7. Body mass index is 45.82 kg/m .  -  Metformin 500 mg bid     HLD:  - Continue PTA ASA and statin     Depression  anxiety  Was feeling depressed during hospitalization.  Seen by psych.  He seems more optimistic upon admission to the TCU.  -- ACP to follow here at the TCU.    Chronic microcytic anemia  baseline  hgb 10.5-12. Iron low to 14 9/22/2024. HGB 11.5 here at the TCU  -Continue iron supplement daily  - may need out patient  GI work up       Hypothyroidism:   TSH 0.93 10/7/24  -- Continue synthroid      BPH  --Continue Flomax     Physical  deconditioning  Secondary to recent hospitalization and underlying medical conditions.   --ongoing PT/OT for strengthening.   -- Social work for discharge planning    MED REC REQUIRED  Post Medication Reconciliation Status: discharge medications reconciled, continue medications without change      Electronically signed by: REAGAN Crandall CNP

## 2024-11-12 NOTE — LETTER
" 11/12/2024      Clarke Moreira  [address]  [place] [address]        [facility] GERIATRICS    Chief Complaint   Patient presents with     RECHECK     HPI:  Clarke Moreira is a 74 year old  (1950), who is being seen today for an episodic care visit at: [facility] ([facility]) [[id number]]. Today's concern is:     Clarke was visited today while in his room sitting up in the chair.  He does not feel short of breath.  Ate breakfast this morning.  Has been sleeping well.  Has not noted an increase to lower extremity edema since decreasing water pill but has noted a substantial decrease in the amount that he is urinating.  He is having left ankle pain which is new.  Describes it as shooting at times but when he bears weight on it it is throbbing.    Allergies, and PMH/PSH reviewed in EPIC today.  REVIEW OF SYSTEMS:  4 point ROS including Respiratory, CV, GI and , other than that noted in the HPI,  is negative    Objective:   BP (!) 129/106   Pulse 72   Temp 98  F (36.7  C)   Resp 22   Ht 1.803 m (5' 11\")   Wt (!) 153.3 kg (338 lb)   SpO2 100%   BMI 47.14 kg/m    GENERAL APPEARANCE:  Alert, in no distress, pleasant, cooperative, oriented x 4  EYES: no discharge or mattering on lids or lashes noted  ENT:  moist mucous membranes, hearing acuity intact  NECK: supple, symmetrical  RESP: no respiratory distress, Lung sounds clear, patient is on room air  CV:  rate and rhythm regular, no murmur. Edema trace in bilateral lower extremities, teds are in place. VASCULAR: warm extremities without open areas.  ABDOMEN: obese, normal bowel sounds, soft, nontender.  M/S:   Gait and station: unsafe without assistance. no tenderness or swelling of the joints; able to move all extremities   SKIN:  Inspection and palpation of skin and subcutaneous tissue: skin warm, dry and intact without rashes  NEURO: no facial asymmetry, no speech deficits and able to follow directions, moves all " extremities symmetrically  PSYCH:  insight and judgement intact, memory intact, affect and mood normal    CBC RESULTS:   Recent Labs   Lab Test 11/05/24  0517 10/28/24  0612   WBC 7.3 8.3   RBC 4.94 4.87   HGB 11.5* 11.5*   HCT 37.4* 37.9*   MCV 76* 78   MCH 23.3* 23.6*   MCHC 30.7* 30.3*   RDW 17.2* 17.2*    279       Last Basic Metabolic Panel:  Recent Labs   Lab Test 11/13/24  0506 11/07/24  0525 11/05/24  0517 11/02/24  0744 10/28/24  0645 10/28/24  0612   NA  --   --  139  --   --  136   POTASSIUM 3.5 3.0* 2.8*  --   --  3.8   CHLORIDE  --   --  95*  --   --  101   CHERI  --   --  8.7*  --   --  9.5   CO2  --   --  32*  --   --  25   BUN  --   --  14.8  --   --  10.7   CR  --   --  0.97  --   --  0.90   GLC  --   --  117* 135*   < > 125*    < > = values in this interval not displayed.       Liver Function Studies -   Recent Labs   Lab Test 10/25/24  0454 09/23/24  0638   PROTTOTAL 7.1 6.6   ALBUMIN 3.6 3.6   BILITOTAL 0.7 0.7   ALKPHOS 119 97   AST 27 45   ALT 12 17       TSH   Date Value Ref Range Status   10/07/2024 0.93 0.30 - 4.20 uIU/mL Final   09/22/2024 0.79 0.30 - 4.20 uIU/mL Final   05/09/2022 3.98 0.40 - 4.00 mU/L Final   11/09/2020 2.69 0.40 - 4.00 mU/L Final   11/05/2019 0.75 0.40 - 4.00 mU/L Final   ]    Lab Results   Component Value Date    A1C 6.2 10/07/2024    A1C 6.5 09/26/2024    A1C 6.2 05/06/2021    A1C 5.9 11/09/2020       Assessment/Plan:  Lower back pain    Bilateral knee hip pain   Has bilateral knee pain.  Did have several falls at home due to his knees buckling.  He is not interested in steroid injections but would like to try Voltaren gel which she is unsure if this has been helpful.  Now with new onset of left ankle pain.  How he is describing it wonder if this is neuropathy but will obtain an x-ray given the pain is substantial upon bearing weight.  --Three-view x-ray of the left ankle.  Will have onsite Ortho review imaging and if needed, complete a visit.  -- Continue  Voltaren gel 1%.  Apply 2 g to bilateral knees 3 times daily.  -- Ongoing PT OT for strengthening  -- Apply Biofreeze to left ankle twice daily once in the morning and another at at bedtime.    HFpEF  Hypertensive heart disease   Echo  10/25:  EF 55-60%.  Spironolactone was held during hospitalization and torsemide was decreased from 80 mg down to 40 but back to home doses at discharge. K+ has been low on bloodwork here but now in normal range after increases to daily dose.  His torsemide was decreased from 80 mg down to 60 mg and there has not been an increase to his weight or edema so we will continue with the lower dose.  --Continue with spironolactone 50 mg daily  --Torsemide 60 mg daily.   -- Continue potassium chloride to 40 mg BID  --Continue to monitor blood pressure and heart rate, adjust medications as needed.     Paroxysmal A fib  Long term anticoagulation   CHADSVASC 5. INR supratherapuetic yesterday to 3.6. HR 70-90s  -- Coumadin 5 mg Thursday and 10 mg all other days and recheck INR on 11/11.  -- Continue with metoprolol  mg daily.      Chronic hypercapnic respiratory failure   THONG  --continue with CPAP however he does not have it at the TCU and there is currently no way for him to get it from his home.      CKD III:   baseline Cr around 1. Elevated to 1.77 on hospital admission. Last creatinine of 0.90 on 10/28.  --Avoid nephrotoxic medications. Recheck BMP periodically throughout the stay.      T2DM with neuropathy  obesity   A1c 6.2 10/7. Body mass index is 45.82 kg/m .  -  Metformin 500 mg bid     HLD:  - Continue PTA ASA and statin     Depression  anxiety  Was feeling depressed during hospitalization.  Seen by psych.  He seems more optimistic upon admission to the TCU.  -- ACP to follow here at the TCU.    Chronic microcytic anemia  baseline  hgb 10.5-12. Iron low to 14 9/22/2024. HGB 11.5 here at the TCU  -Continue iron supplement daily  - may need out patient  GI work up        Hypothyroidism:   TSH 0.93 10/7/24  -- Continue synthroid      BPH  --Continue Flomax     Physical deconditioning  Secondary to recent hospitalization and underlying medical conditions.   --ongoing PT/OT for strengthening.   -- Social work for discharge planning    MED REC REQUIRED  Post Medication Reconciliation Status: discharge medications reconciled, continue medications without change      Electronically signed by: REAGAN Crandall CNP         Sincerely,        REAGAN Crandall CNP

## 2024-11-13 ENCOUNTER — TELEPHONE (OUTPATIENT)
Dept: GERIATRICS | Facility: CLINIC | Age: 74
End: 2024-11-13

## 2024-11-13 LAB
INR (EXTERNAL): 1.9 (ref 0.9–1.1)
POTASSIUM SERPL-SCNC: 3.5 MMOL/L (ref 3.4–5.3)

## 2024-11-13 PROCEDURE — 84132 ASSAY OF SERUM POTASSIUM: CPT | Mod: ORL | Performed by: NURSE PRACTITIONER

## 2024-11-13 PROCEDURE — P9604 ONE-WAY ALLOW PRORATED TRIP: HCPCS | Mod: ORL | Performed by: NURSE PRACTITIONER

## 2024-11-13 PROCEDURE — 36415 COLL VENOUS BLD VENIPUNCTURE: CPT | Mod: ORL | Performed by: NURSE PRACTITIONER

## 2024-11-13 NOTE — TELEPHONE ENCOUNTER
"Provider: REAGAN Ugarte CNP   Facility: [facility]   Facility Type:  TCU    Caller: Milana  Call Back Number: [phone]  Reason for call: INR  Diagnosis/Goal: A. Fib  Heparin/Lovenox:  No  Currently on ABX?: No  Other interacting medication:  None  Missed or refused doses: No    Date INR Previous Dose/Orders   11/4 2.72 5 mg Thur and 10 mg AOD   11/7 3.09 5 mg Th/Sat and 10 mg AOD   11/11 3.6 Held x 1, then 3 mg   11/13 1.9      Nurse is questioning the INR machine.  She doesn't feel it is accurate and not sure if it needs to be calibrated.      Telephone encounter sent to: REAGAN Ugarte CNP     Requesting orders for Warfarin dosing and date of next INR draw  Please respond to \"Geriatrics Nurse Pool\".    Janell Arredondo RN   "

## 2024-11-15 LAB — PHYTONADIONE SERPL-MCNC: 0.83 NMOL/L

## 2024-11-18 ENCOUNTER — TRANSITIONAL CARE UNIT VISIT (OUTPATIENT)
Dept: GERIATRICS | Facility: CLINIC | Age: 74
End: 2024-11-18
Payer: COMMERCIAL

## 2024-11-18 VITALS
RESPIRATION RATE: 20 BRPM | DIASTOLIC BLOOD PRESSURE: 60 MMHG | BODY MASS INDEX: 48.26 KG/M2 | WEIGHT: 315 LBS | SYSTOLIC BLOOD PRESSURE: 104 MMHG | HEART RATE: 71 BPM | TEMPERATURE: 97.9 F | OXYGEN SATURATION: 97 %

## 2024-11-18 DIAGNOSIS — M25.572 PAIN IN JOINT, ANKLE AND FOOT, LEFT: ICD-10-CM

## 2024-11-18 DIAGNOSIS — N18.32 TYPE 2 DIABETES MELLITUS WITH STAGE 3B CHRONIC KIDNEY DISEASE, WITHOUT LONG-TERM CURRENT USE OF INSULIN (H): ICD-10-CM

## 2024-11-18 DIAGNOSIS — G89.29 CHRONIC PAIN OF BOTH KNEES: Primary | ICD-10-CM

## 2024-11-18 DIAGNOSIS — G47.33 OSA (OBSTRUCTIVE SLEEP APNEA): ICD-10-CM

## 2024-11-18 DIAGNOSIS — I11.0 HYPERTENSIVE HEART DISEASE WITH CHRONIC DIASTOLIC CONGESTIVE HEART FAILURE (H): ICD-10-CM

## 2024-11-18 DIAGNOSIS — I48.20 CHRONIC ATRIAL FIBRILLATION (H): ICD-10-CM

## 2024-11-18 DIAGNOSIS — E11.22 TYPE 2 DIABETES MELLITUS WITH STAGE 3B CHRONIC KIDNEY DISEASE, WITHOUT LONG-TERM CURRENT USE OF INSULIN (H): ICD-10-CM

## 2024-11-18 DIAGNOSIS — I50.32 HYPERTENSIVE HEART DISEASE WITH CHRONIC DIASTOLIC CONGESTIVE HEART FAILURE (H): ICD-10-CM

## 2024-11-18 DIAGNOSIS — M25.562 CHRONIC PAIN OF BOTH KNEES: Primary | ICD-10-CM

## 2024-11-18 DIAGNOSIS — M25.561 CHRONIC PAIN OF BOTH KNEES: Primary | ICD-10-CM

## 2024-11-18 DIAGNOSIS — I50.32 CHRONIC HEART FAILURE WITH PRESERVED EJECTION FRACTION (H): ICD-10-CM

## 2024-11-18 DIAGNOSIS — R53.81 PHYSICAL DECONDITIONING: ICD-10-CM

## 2024-11-18 PROCEDURE — 99309 SBSQ NF CARE MODERATE MDM 30: CPT | Performed by: NURSE PRACTITIONER

## 2024-11-18 NOTE — PROGRESS NOTES
[facility] GERIATRICS    Chief Complaint   Patient presents with    RECHECK     HPI:  Clarke Moreira is a 74 year old  (1950), who is being seen today for an episodic care visit at: [facility] ([facility]) [[id number]]. Today's concern is:     Clarke was visited today while in his room while resting in his bed.  He shares that he had indigestion last night in which she believes is due to the food he had here over the weekend.  He is having regular bowel movements.  Sleeping well.  He is very happy with the progress he is making with therapy.  He is having left ankle pain which is new onset.  X-ray was completed last week with chronic findings.  He describes the pain as being 0 when he is resting but when he bears weight, the pain is very uncomfortable and can make it difficult to walk when he is ambulating for longer distances.  But after several minutes of rest, the pain subsides    Allergies, and PMH/PSH reviewed in EPIC today.  REVIEW OF SYSTEMS:  4 point ROS including Respiratory, CV, GI and , other than that noted in the HPI,  is negative    Objective:   /60   Pulse 71   Temp 97.9  F (36.6  C)   Resp 20   Wt (!) 156.9 kg (346 lb)   SpO2 97%   BMI 48.26 kg/m    GENERAL APPEARANCE:  Alert, in no distress, pleasant, cooperative, oriented x 4  EYES: no discharge or mattering on lids or lashes noted  ENT:  moist mucous membranes, hearing acuity intact  NECK: supple, symmetrical  RESP: no respiratory distress, Lung sounds clear, patient is on room air  CV:  rate and rhythm regular, no murmur. Edema trace in bilateral lower extremities, teds are in place. VASCULAR: warm extremities without open areas.  ABDOMEN: obese, normal bowel sounds, soft, nontender.  M/S:   Gait and station: unsafe without assistance. no tenderness or swelling of the joints; able to move all extremities   SKIN:  Inspection and palpation of skin and subcutaneous tissue: skin warm, dry and intact without  annette  NEURO: no facial asymmetry, no speech deficits and able to follow directions, moves all extremities symmetrically  PSYCH:  insight and judgement intact, memory intact, affect and mood normal    CBC RESULTS:   Recent Labs   Lab Test 11/05/24  0517 10/28/24  0612   WBC 7.3 8.3   RBC 4.94 4.87   HGB 11.5* 11.5*   HCT 37.4* 37.9*   MCV 76* 78   MCH 23.3* 23.6*   MCHC 30.7* 30.3*   RDW 17.2* 17.2*    279       Last Basic Metabolic Panel:  Recent Labs   Lab Test 11/13/24  0506 11/07/24  0525 11/05/24 0517 11/02/24  0744 10/28/24  0645 10/28/24  0612   NA  --   --  139  --   --  136   POTASSIUM 3.5 3.0* 2.8*  --   --  3.8   CHLORIDE  --   --  95*  --   --  101   CHERI  --   --  8.7*  --   --  9.5   CO2  --   --  32*  --   --  25   BUN  --   --  14.8  --   --  10.7   CR  --   --  0.97  --   --  0.90   GLC  --   --  117* 135*   < > 125*    < > = values in this interval not displayed.       Liver Function Studies -   Recent Labs   Lab Test 10/25/24  0454 09/23/24  0638   PROTTOTAL 7.1 6.6   ALBUMIN 3.6 3.6   BILITOTAL 0.7 0.7   ALKPHOS 119 97   AST 27 45   ALT 12 17       TSH   Date Value Ref Range Status   10/07/2024 0.93 0.30 - 4.20 uIU/mL Final   09/22/2024 0.79 0.30 - 4.20 uIU/mL Final   05/09/2022 3.98 0.40 - 4.00 mU/L Final   11/09/2020 2.69 0.40 - 4.00 mU/L Final   11/05/2019 0.75 0.40 - 4.00 mU/L Final   ]    Lab Results   Component Value Date    A1C 6.2 10/07/2024    A1C 6.5 09/26/2024    A1C 6.2 05/06/2021    A1C 5.9 11/09/2020       Assessment/Plan:  Lower back pain    Bilateral knee hip pain   Has bilateral knee pain.  Did have several falls at home due to his knees buckling.  He is not interested in steroid injections but was Voltaren gel which he believes has been helpful.  Now with new onset of left ankle pain when he bears weight.  An x-ray was completed last week and did not have acute findings.  Did have orthopedic NP review who reported it looks like Clarke has chronic appearing deformity of  the left proximal phalanx and some bony projections from heel bone which can cause discomfort on the tendons and ligaments.  Suspect that the pain he is having when he bears weight is due to potential tendinitis.  Discussed with him today that if pain gets worse or does not improve, we can do a trial of ibuprofen.  -- Continue Voltaren gel 1%.  Apply 2 g to bilateral knees 3 times daily.  -- Ongoing PT OT for strengthening  -- Apply Biofreeze to left ankle twice daily once in the morning and another at at bedtime.    HFpEF  Hypertensive heart disease   Echo  10/25:  EF 55-60%.  Spironolactone was held during hospitalization and torsemide was decreased from 80 mg down to 40 but back to home doses at discharge. K+ has been low on bloodwork here but now in normal range after increases to daily dose.  His torsemide was decreased from 80 mg down to 60 mg and there has not been an increase to his weight or edema so we will continue with the lower dose.  --Continue with spironolactone 50 mg daily  --Torsemide 60 mg daily.   -- Continue potassium chloride to 40 mg BID  --Continue to monitor blood pressure and heart rate, adjust medications as needed.     Paroxysmal A fib  Long term anticoagulation   CHADSVASC 5. INR has been 1.8-1.9  -- Coumadin 5 mg Thursday and 10 mg all other days and recheck INR on 11/11.  -- Continue with metoprolol  mg daily.      Chronic hypercapnic respiratory failure   THONG  --continue with CPAP however he does not have it at the TCU and there is currently no way for him to get it from his home.      CKD III:   baseline Cr around 1. Elevated to 1.77 on hospital admission. Last creatinine of 0.90 on 10/28.  --Avoid nephrotoxic medications. Recheck BMP periodically throughout the stay.      T2DM with neuropathy  obesity   A1c 6.2 10/7. Body mass index is 45.82 kg/m .  -  Metformin 500 mg bid     HLD:  - Continue PTA ASA and statin     Depression  anxiety  Was feeling depressed during  hospitalization.  Seen by psych.  He seems more optimistic upon admission to the TCU.  -- ACP to follow here at the TCU.    Chronic microcytic anemia  baseline  hgb 10.5-12. Iron low to 14 9/22/2024. HGB 11.5 here at the TCU  -Continue iron supplement daily  - may need out patient  GI work up       Hypothyroidism:   TSH 0.93 10/7/24  -- Continue synthroid      BPH  --Continue Flomax     Physical deconditioning  Secondary to recent hospitalization and underlying medical conditions.   --ongoing PT/OT for strengthening.   -- Social work for discharge planning    MED REC REQUIRED  Post Medication Reconciliation Status: discharge medications reconciled, continue medications without change      Electronically signed by: REAGAN Crandall CNP

## 2024-11-18 NOTE — LETTER
11/18/2024      Clarke Moreira  [address]  [facility] [place] [address]        [facility] GERIATRICS    Chief Complaint   Patient presents with     RECHECK     HPI:  Clarke Moreira is a 74 year old  (1950), who is being seen today for an episodic care visit at: [facility] ([facility]) [[id number]]. Today's concern is:     Clarke was visited today while in his room while resting in his bed.  He shares that he had indigestion last night in which she believes is due to the food he had here over the weekend.  He is having regular bowel movements.  Sleeping well.  He is very happy with the progress he is making with therapy.  He is having left ankle pain which is new onset.  X-ray was completed last week with chronic findings.  He describes the pain as being 0 when he is resting but when he bears weight, the pain is very uncomfortable and can make it difficult to walk when he is ambulating for longer distances.  But after several minutes of rest, the pain subsides    Allergies, and PMH/PSH reviewed in EPIC today.  REVIEW OF SYSTEMS:  4 point ROS including Respiratory, CV, GI and , other than that noted in the HPI,  is negative    Objective:   /60   Pulse 71   Temp 97.9  F (36.6  C)   Resp 20   Wt (!) 156.9 kg (346 lb)   SpO2 97%   BMI 48.26 kg/m    GENERAL APPEARANCE:  Alert, in no distress, pleasant, cooperative, oriented x 4  EYES: no discharge or mattering on lids or lashes noted  ENT:  moist mucous membranes, hearing acuity intact  NECK: supple, symmetrical  RESP: no respiratory distress, Lung sounds clear, patient is on room air  CV:  rate and rhythm regular, no murmur. Edema trace in bilateral lower extremities, teds are in place. VASCULAR: warm extremities without open areas.  ABDOMEN: obese, normal bowel sounds, soft, nontender.  M/S:   Gait and station: unsafe without assistance. no tenderness or swelling of the joints; able to move all extremities   SKIN:  Inspection  and palpation of skin and subcutaneous tissue: skin warm, dry and intact without rashes  NEURO: no facial asymmetry, no speech deficits and able to follow directions, moves all extremities symmetrically  PSYCH:  insight and judgement intact, memory intact, affect and mood normal    CBC RESULTS:   Recent Labs   Lab Test 11/05/24  0517 10/28/24  0612   WBC 7.3 8.3   RBC 4.94 4.87   HGB 11.5* 11.5*   HCT 37.4* 37.9*   MCV 76* 78   MCH 23.3* 23.6*   MCHC 30.7* 30.3*   RDW 17.2* 17.2*    279       Last Basic Metabolic Panel:  Recent Labs   Lab Test 11/13/24  0506 11/07/24  0525 11/05/24 0517 11/02/24  0744 10/28/24  0645 10/28/24  0612   NA  --   --  139  --   --  136   POTASSIUM 3.5 3.0* 2.8*  --   --  3.8   CHLORIDE  --   --  95*  --   --  101   CHERI  --   --  8.7*  --   --  9.5   CO2  --   --  32*  --   --  25   BUN  --   --  14.8  --   --  10.7   CR  --   --  0.97  --   --  0.90   GLC  --   --  117* 135*   < > 125*    < > = values in this interval not displayed.       Liver Function Studies -   Recent Labs   Lab Test 10/25/24  0454 09/23/24  0638   PROTTOTAL 7.1 6.6   ALBUMIN 3.6 3.6   BILITOTAL 0.7 0.7   ALKPHOS 119 97   AST 27 45   ALT 12 17       TSH   Date Value Ref Range Status   10/07/2024 0.93 0.30 - 4.20 uIU/mL Final   09/22/2024 0.79 0.30 - 4.20 uIU/mL Final   05/09/2022 3.98 0.40 - 4.00 mU/L Final   11/09/2020 2.69 0.40 - 4.00 mU/L Final   11/05/2019 0.75 0.40 - 4.00 mU/L Final   ]    Lab Results   Component Value Date    A1C 6.2 10/07/2024    A1C 6.5 09/26/2024    A1C 6.2 05/06/2021    A1C 5.9 11/09/2020       Assessment/Plan:  Lower back pain    Bilateral knee hip pain   Has bilateral knee pain.  Did have several falls at home due to his knees buckling.  He is not interested in steroid injections but was Voltaren gel which he believes has been helpful.  Now with new onset of left ankle pain when he bears weight.  An x-ray was completed last week and did not have acute findings.  Did have orthopedic  NP review who reported it looks like Clarke has chronic appearing deformity of the left proximal phalanx and some bony projections from heel bone which can cause discomfort on the tendons and ligaments.  Suspect that the pain he is having when he bears weight is due to potential tendinitis.  Discussed with him today that if pain gets worse or does not improve, we can do a trial of ibuprofen.  -- Continue Voltaren gel 1%.  Apply 2 g to bilateral knees 3 times daily.  -- Ongoing PT OT for strengthening  -- Apply Biofreeze to left ankle twice daily once in the morning and another at at bedtime.    HFpEF  Hypertensive heart disease   Echo  10/25:  EF 55-60%.  Spironolactone was held during hospitalization and torsemide was decreased from 80 mg down to 40 but back to home doses at discharge. K+ has been low on bloodwork here but now in normal range after increases to daily dose.  His torsemide was decreased from 80 mg down to 60 mg and there has not been an increase to his weight or edema so we will continue with the lower dose.  --Continue with spironolactone 50 mg daily  --Torsemide 60 mg daily.   -- Continue potassium chloride to 40 mg BID  --Continue to monitor blood pressure and heart rate, adjust medications as needed.     Paroxysmal A fib  Long term anticoagulation   CHADSVASC 5. INR has been 1.8-1.9  -- Coumadin 5 mg Thursday and 10 mg all other days and recheck INR on 11/11.  -- Continue with metoprolol  mg daily.      Chronic hypercapnic respiratory failure   THONG  --continue with CPAP however he does not have it at the TCU and there is currently no way for him to get it from his home.      CKD III:   baseline Cr around 1. Elevated to 1.77 on hospital admission. Last creatinine of 0.90 on 10/28.  --Avoid nephrotoxic medications. Recheck BMP periodically throughout the stay.      T2DM with neuropathy  obesity   A1c 6.2 10/7. Body mass index is 45.82 kg/m .  -  Metformin 500 mg bid     HLD:  - Continue PTA  ASA and statin     Depression  anxiety  Was feeling depressed during hospitalization.  Seen by psych.  He seems more optimistic upon admission to the TCU.  -- ACP to follow here at the TCU.    Chronic microcytic anemia  baseline  hgb 10.5-12. Iron low to 14 9/22/2024. HGB 11.5 here at the TCU  -Continue iron supplement daily  - may need out patient  GI work up       Hypothyroidism:   TSH 0.93 10/7/24  -- Continue synthroid      BPH  --Continue Flomax     Physical deconditioning  Secondary to recent hospitalization and underlying medical conditions.   --ongoing PT/OT for strengthening.   -- Social work for discharge planning    MED REC REQUIRED  Post Medication Reconciliation Status: discharge medications reconciled, continue medications without change      Electronically signed by: REAAGN Crandall CNP           Sincerely,        REAGAN Crandall CNP

## 2024-11-19 LAB — INR (EXTERNAL): 1.4 (ref 0.9–1.1)

## 2024-11-21 ENCOUNTER — LAB REQUISITION (OUTPATIENT)
Dept: LAB | Facility: CLINIC | Age: 74
End: 2024-11-21
Payer: COMMERCIAL

## 2024-11-21 DIAGNOSIS — I50.9 HEART FAILURE, UNSPECIFIED (H): ICD-10-CM

## 2024-11-22 LAB
ANION GAP SERPL CALCULATED.3IONS-SCNC: 10 MMOL/L (ref 7–15)
BUN SERPL-MCNC: 19.4 MG/DL (ref 8–23)
CALCIUM SERPL-MCNC: 8.8 MG/DL (ref 8.8–10.4)
CHLORIDE SERPL-SCNC: 96 MMOL/L (ref 98–107)
CREAT SERPL-MCNC: 1.2 MG/DL (ref 0.67–1.17)
EGFRCR SERPLBLD CKD-EPI 2021: 63 ML/MIN/1.73M2
GLUCOSE SERPL-MCNC: 108 MG/DL (ref 70–99)
HCO3 SERPL-SCNC: 30 MMOL/L (ref 22–29)
INR (EXTERNAL): 1.6 (ref 0.9–1.1)
POTASSIUM SERPL-SCNC: 3.8 MMOL/L (ref 3.4–5.3)
SODIUM SERPL-SCNC: 136 MMOL/L (ref 135–145)

## 2024-11-22 PROCEDURE — 80048 BASIC METABOLIC PNL TOTAL CA: CPT | Mod: ORL | Performed by: NURSE PRACTITIONER

## 2024-11-22 PROCEDURE — P9604 ONE-WAY ALLOW PRORATED TRIP: HCPCS | Mod: ORL | Performed by: NURSE PRACTITIONER

## 2024-11-22 PROCEDURE — 36415 COLL VENOUS BLD VENIPUNCTURE: CPT | Mod: ORL | Performed by: NURSE PRACTITIONER

## 2024-11-24 ENCOUNTER — TRANSFERRED RECORDS (OUTPATIENT)
Dept: MULTI SPECIALTY CLINIC | Facility: CLINIC | Age: 74
End: 2024-11-24

## 2024-11-24 LAB — RETINOPATHY: NORMAL

## 2024-11-25 ENCOUNTER — LAB REQUISITION (OUTPATIENT)
Dept: LAB | Facility: CLINIC | Age: 74
End: 2024-11-25
Payer: COMMERCIAL

## 2024-11-25 ENCOUNTER — TRANSITIONAL CARE UNIT VISIT (OUTPATIENT)
Dept: GERIATRICS | Facility: CLINIC | Age: 74
End: 2024-11-25
Payer: COMMERCIAL

## 2024-11-25 VITALS
RESPIRATION RATE: 18 BRPM | HEIGHT: 71 IN | WEIGHT: 315 LBS | OXYGEN SATURATION: 95 % | DIASTOLIC BLOOD PRESSURE: 81 MMHG | BODY MASS INDEX: 44.1 KG/M2 | HEART RATE: 95 BPM | SYSTOLIC BLOOD PRESSURE: 119 MMHG | TEMPERATURE: 97.5 F

## 2024-11-25 DIAGNOSIS — I48.20 CHRONIC ATRIAL FIBRILLATION (H): Primary | ICD-10-CM

## 2024-11-25 DIAGNOSIS — I50.9 HEART FAILURE, UNSPECIFIED (H): ICD-10-CM

## 2024-11-25 DIAGNOSIS — Z51.81 ENCOUNTER FOR THERAPEUTIC DRUG MONITORING: ICD-10-CM

## 2024-11-25 DIAGNOSIS — Z79.01 LONG TERM CURRENT USE OF ANTICOAGULANT THERAPY: ICD-10-CM

## 2024-11-25 LAB — INR (EXTERNAL): 2.1 (ref 0.9–1.1)

## 2024-11-25 PROCEDURE — 99308 SBSQ NF CARE LOW MDM 20: CPT | Performed by: NURSE PRACTITIONER

## 2024-11-25 NOTE — LETTER
" 11/25/2024      Clarke Moreira  [address]  [facility] [id number]        [facility]  [place] [facility] Record Number:  [id number]  Place of Service where encounter took place: [facility] ([facility]) [[id number]]    HPI:    Clarke Moreira is a 74 year old  (1950), who is being seen today for an episodic care visit at the above location. Today's concern is INR/Coumadin management for A. Fib    ROS/Subjective:  Bleeding Signs/Symptoms:  None  Thromboembolic Signs/Symptoms:  None  Medication Changes:  No  Dietary Changes:  No  Activity Changes: No  Bacterial/Viral Infection:  No  Missed Coumadin Doses:  None  On ASA: No  Other Concerns:  No    OBJECTIVE:  /81   Pulse 95   Temp 97.5  F (36.4  C)   Resp 18   Ht 1.803 m (5' 11\")   Wt (!) 156.2 kg (344 lb 6.4 oz)   SpO2 95%   BMI 48.03 kg/m    Last INR: 1.6 on 11/22/24  INR Today:  2.1  Current Dose:  10 mg on F and 8mg Sa/Marcano      ASSESSMENT:  1. Chronic atrial fibrillation (H)    2. Long term current use of anticoagulant therapy    3. Encounter for therapeutic drug monitoring      Therapeutic INR for goal of 2-3    PLAN/ORDERS:   New Dose: 8 mg daily    Next INR: Friday 11/29/24      Electronically signed by:  REAGAN Crandall CNP       Sincerely,        REAGAN Crandall CNP      "

## 2024-11-25 NOTE — PROGRESS NOTES
"[facility] GERIATRICS  [place] Medical Record Number:  [id number]  [id number]    HPI:    Clarke Moreira is a 74 year old  (1950), who is being seen today for an episodic care visit at the above location. Today's concern is INR/Coumadin management for A. Fib    ROS/Subjective:  Bleeding Signs/Symptoms:  None  Thromboembolic Signs/Symptoms:  None  Medication Changes:  No  Dietary Changes:  No  Activity Changes: No  Bacterial/Viral Infection:  No  Missed Coumadin Doses:  None  On ASA: No  Other Concerns:  No    OBJECTIVE:  /81   Pulse 95   Temp 97.5  F (36.4  C)   Resp 18   Ht 1.803 m (5' 11\")   Wt (!) 156.2 kg (344 lb 6.4 oz)   SpO2 95%   BMI 48.03 kg/m    Last INR: 1.6 on 11/22/24  INR Today:  2.1  Current Dose:  10 mg on F and 8mg Sa/Marcano      ASSESSMENT:  1. Chronic atrial fibrillation (H)    2. Long term current use of anticoagulant therapy    3. Encounter for therapeutic drug monitoring      Therapeutic INR for goal of 2-3    PLAN/ORDERS:   New Dose: 8 mg daily    Next INR: Friday 11/29/24      Electronically signed by:  REAGAN Crandall CNP     "

## 2024-11-26 ENCOUNTER — DISCHARGE SUMMARY NURSING HOME (OUTPATIENT)
Dept: GERIATRICS | Facility: CLINIC | Age: 74
End: 2024-11-26
Payer: COMMERCIAL

## 2024-11-26 VITALS
HEIGHT: 71 IN | HEART RATE: 91 BPM | SYSTOLIC BLOOD PRESSURE: 119 MMHG | TEMPERATURE: 98 F | DIASTOLIC BLOOD PRESSURE: 71 MMHG | RESPIRATION RATE: 22 BRPM | BODY MASS INDEX: 44.1 KG/M2 | OXYGEN SATURATION: 96 % | WEIGHT: 315 LBS

## 2024-11-26 DIAGNOSIS — M25.561 CHRONIC PAIN OF BOTH KNEES: Primary | ICD-10-CM

## 2024-11-26 DIAGNOSIS — G89.29 CHRONIC PAIN OF BOTH KNEES: Primary | ICD-10-CM

## 2024-11-26 DIAGNOSIS — I11.0 HYPERTENSIVE HEART DISEASE WITH CHRONIC DIASTOLIC CONGESTIVE HEART FAILURE (H): ICD-10-CM

## 2024-11-26 DIAGNOSIS — G47.33 OSA (OBSTRUCTIVE SLEEP APNEA): ICD-10-CM

## 2024-11-26 DIAGNOSIS — E11.22 TYPE 2 DIABETES MELLITUS WITH STAGE 3B CHRONIC KIDNEY DISEASE, WITHOUT LONG-TERM CURRENT USE OF INSULIN (H): ICD-10-CM

## 2024-11-26 DIAGNOSIS — I50.32 CHRONIC HEART FAILURE WITH PRESERVED EJECTION FRACTION (H): ICD-10-CM

## 2024-11-26 DIAGNOSIS — M25.562 CHRONIC PAIN OF BOTH KNEES: Primary | ICD-10-CM

## 2024-11-26 DIAGNOSIS — N18.32 TYPE 2 DIABETES MELLITUS WITH STAGE 3B CHRONIC KIDNEY DISEASE, WITHOUT LONG-TERM CURRENT USE OF INSULIN (H): ICD-10-CM

## 2024-11-26 DIAGNOSIS — R53.81 PHYSICAL DECONDITIONING: ICD-10-CM

## 2024-11-26 DIAGNOSIS — M25.572 PAIN IN JOINT, ANKLE AND FOOT, LEFT: ICD-10-CM

## 2024-11-26 DIAGNOSIS — Z79.01 LONG TERM CURRENT USE OF ANTICOAGULANT THERAPY: ICD-10-CM

## 2024-11-26 DIAGNOSIS — I48.20 CHRONIC ATRIAL FIBRILLATION (H): ICD-10-CM

## 2024-11-26 DIAGNOSIS — I50.32 HYPERTENSIVE HEART DISEASE WITH CHRONIC DIASTOLIC CONGESTIVE HEART FAILURE (H): ICD-10-CM

## 2024-11-26 LAB
ANION GAP SERPL CALCULATED.3IONS-SCNC: 19 MMOL/L (ref 7–15)
BUN SERPL-MCNC: 16 MG/DL (ref 8–23)
CALCIUM SERPL-MCNC: 10 MG/DL (ref 8.8–10.4)
CHLORIDE SERPL-SCNC: 94 MMOL/L (ref 98–107)
CREAT SERPL-MCNC: 1.14 MG/DL (ref 0.67–1.17)
EGFRCR SERPLBLD CKD-EPI 2021: 67 ML/MIN/1.73M2
GLUCOSE SERPL-MCNC: 140 MG/DL (ref 70–99)
HCO3 SERPL-SCNC: 22 MMOL/L (ref 22–29)
POTASSIUM SERPL-SCNC: 4.6 MMOL/L (ref 3.4–5.3)
SODIUM SERPL-SCNC: 135 MMOL/L (ref 135–145)

## 2024-11-26 PROCEDURE — 80048 BASIC METABOLIC PNL TOTAL CA: CPT | Mod: ORL | Performed by: NURSE PRACTITIONER

## 2024-11-26 PROCEDURE — 99316 NF DSCHRG MGMT 30 MIN+: CPT | Performed by: NURSE PRACTITIONER

## 2024-11-26 PROCEDURE — P9604 ONE-WAY ALLOW PRORATED TRIP: HCPCS | Mod: ORL | Performed by: NURSE PRACTITIONER

## 2024-11-26 PROCEDURE — 36415 COLL VENOUS BLD VENIPUNCTURE: CPT | Mod: ORL | Performed by: NURSE PRACTITIONER

## 2024-11-26 NOTE — PROGRESS NOTES
[facility] GERIATRICS DISCHARGE SUMMARY  PATIENT'S NAME: Clarke Moreira  YOB: 1950  MEDICAL RECORD NUMBER:  [id number]  Place of Service where encounter took place:  [facility] ([facility]) [[id number]]    PRIMARY CARE PROVIDER AND CLINIC RESPONSIBLE AFTER TRANSFER:   Henrry Shepard MD, [address] [address] Dr. Luis Nova   Non-G Provider     Transferring providers: REAGAN Crandall CNP , MD  Recent Hospitalization/ED:  [facility] stay 9/22/24 to 10/3/24.  Date of SNF Admission: October 03, 2024  Date of SNF (anticipated) Discharge: November 27, 2024  Discharged to: previous independent home  Cognitive Scores: SLUMS: 28/30 (11/11/24)  Physical Function:   Bed Mobility Roll left and right = Independent  Sit to lying = Independent  Lying to sitting on side of bed = Independent  Transfers Sit to stand = Setup or clean-up assistance  Chair/bed-to-chair transfer = Setup or clean-up assistance  Toilet transfer = Setup or clean-up assistance  Car transfer = Not attempted due to environmental limitations  Ambulation Walk 10 Feet = Supervision or touching assistance  Walk 50 Feet with Two Turns = Supervision or touching assistance  Walk 150 feet = Supervision or touching assistance  Walking 10 feet on uneven surfaces = Not applicable  Other Ambulation Tasks: 1 step (curb) = Not applicable  Other Picking up object = Not attempted due to medical conditions or safety concerns  Wheelchair Mobility/Management  Does the resident use a wheelchair and/or scooter? = Yes  Wheel 50 feet with two turns = Independent  Indicate the type of wheelchair or scooter used = Manual  Wheel 150 feet = Independent  Indicate the type of wheelchair or scooter used = Manual  Dressing Upper body dressing = Independent  Lower body dressing = Supervision or touching assistance  Putting on/taking off footwear = Independent  DME:  No DME needed    Discharge Recommendations: Recommend looking into [facility] for future change in environment due to mental health   aspects that affect his ability to care for himself. Home health PT recommended to continue working on functional   strength, balance, and endurance.    CODE STATUS/ADVANCE DIRECTIVES DISCUSSION:  No CPR- Do NOT Intubate   ALLERGIES: Seasonal allergies    NURSING FACILITY COURSE   Clarke Moreira  is a 74 year old  (1950) male with history of DMII, CKD, HFpEF, A fib, depression, THONG, chronic anemia, hypothyroidism, HLD, and BPH.  He was hospitalized 9/22 through 10/3/2024 at the [facility] for presyncopal episodes thought to be vasovagal.  Developed refeeding syndrome and A-fib with RVR and citrobacter bacteremia.  Went to the TCU.  While he was at home, his knees buckled and he had a fall on 10/25/2024.  Creatinine elevated to 1.77 up from baseline of 1.  Imaging without fractures, UA and blood cultures without infection.  Developed hypotension secondary to poor oral intake while at home, responded well to IV fluids.  Spironolactone discontinued and torsemide dose decreased.  Ejection fraction 55 to 60%.  Given his inability to care for himself at home, TCU was recommended and he was admitted to the above facility from  [facility]. Hospital stay 10/25/2024 through 11/2/2024..     Lower back pain    Bilateral knee hip pain   Has bilateral knee pain.  Did have several falls at home due to his knees buckling.  He is not interested in steroid injections but was Voltaren gel which he believes has been helpful.  Now with new onset of left ankle pain when he bears weight.  An x-ray was completed last week and did not have acute findings.  Did have orthopedic NP review who reported it looks like Clarke has chronic appearing deformity of the left proximal phalanx and some bony projections from heel bone which can cause  discomfort on the tendons and ligaments.  Suspect that the pain he is having when he bears weight is due to potential tendinitis.  Discussed with him today that if pain gets worse or does not improve, we can do a trial of ibuprofen or he may need further ortho follow up after TCU discharge.   -- Continue Voltaren gel 1%.  Apply 2 g to bilateral knees 3 times daily.  -- Ongoing PT OT for strengthening  -- Apply Biofreeze to left ankle twice daily once in the morning and another at at bedtime.     HFpEF  Hypertensive heart disease   Echo  10/25:  EF 55-60%.  Spironolactone was held during hospitalization and torsemide was decreased from 80 mg down to 40 but back to home doses at discharge. K+ has been low on bloodwork here but now in normal range after increases to daily dose.  His torsemide was decreased from 80 mg down to 60 mg and there has not been an increase to his weight or edema so we will continue with the lower dose.  --Continue with spironolactone 50 mg daily  --Torsemide 60 mg daily.   -- Continue potassium chloride to 40 mg BID  --Continue to monitor blood pressure and heart rate, adjust medications as needed.     Paroxysmal A fib  Long term anticoagulation   CHADSVASC 5. INR  2.1 on 11/25. Currently receiving 8 mg daily but will need to recheck his INR prior to discharge as homecare cannot see him until 11/30 which is a weekend.   -- Continue with metoprolol  mg daily.      Chronic hypercapnic respiratory failure   THONG  --continue with CPAP however he does not have it at the TCU and there is currently no way for him to get it from his home.      CKD III:   baseline Cr around 1. Elevated to 1.77 on hospital admission.   --Avoid nephrotoxic medications. Recheck BMP periodically     T2DM with neuropathy  obesity   A1c 6.2 10/7. Body mass index is 45.82 kg/m .  -  Metformin 500 mg bid     HLD:  - Continue PTA ASA and statin     Depression  anxiety  Was feeling depressed during hospitalization.  Seen by  psych.  He seems more optimistic upon admission to the TCU. Is nervous about returning home.      Chronic microcytic anemia  baseline  hgb 10.5-12. Iron low to 14 9/22/2024. HGB 11.5 here at the TCU  -Continue iron supplement daily  - may need out patient  GI work up       Hypothyroidism:   TSH 0.93 10/7/24  -- Continue synthroid      BPH  --Continue Flomax     Physical deconditioning  Secondary to recent hospitalization and underlying medical conditions.   --ongoing PT/OT for strengthening.   -- Social work for discharge planning      Discharge Medications:  MED REC REQUIRED  Post Medication Reconciliation Status: discharge medications reconciled and changed, per note/orders     Current Outpatient Medications   Medication Sig Dispense Refill    acetaminophen (TYLENOL) 325 MG tablet Take 2 tablets (650 mg) by mouth every 4 hours as needed for mild pain or other (and adjunct with moderate or severe pain or per patient request).      artificial saliva (BIOTENE MT) SOLN solution Take 1 spray by mouth 4 times daily as needed for dry mouth.      aspirin (ASA) 81 MG chewable tablet Take 1 tablet (81 mg) by mouth daily. 200 tablet 2    atorvastatin (LIPITOR) 40 MG tablet Take 1 tablet (40 mg) by mouth daily 90 tablet 3    CERTAVITE/ANTIOXIDANTS tablet TAKE ONE TABLET BY MOUTH ONE TIME DAILY 100 tablet 0    chlorhexidine (PERIDEX) 0.12 % solution Swish and spit 15 mLs in mouth 2 times daily.      ferrous sulfate (FEROSUL) 325 (65 Fe) MG tablet Take 1 tablet (325 mg) by mouth every other day. For iron deficiency anemia 60 tablet 1    levothyroxine (SYNTHROID/LEVOTHROID) 175 MCG tablet Take 1 tablet (175 mcg) by mouth every morning (before breakfast) 90 tablet 3    metFORMIN (GLUCOPHAGE) 500 MG tablet Take 1 tablet (500 mg) by mouth 2 times daily (with meals). 180 tablet 3    metoprolol succinate ER (TOPROL XL) 100 MG 24 hr tablet Take 1 tablet (100 mg) by mouth daily.      Omega-3 Fatty Acids (EQL FISH OIL) 1000 MG CAPS Take  1 capsule by mouth daily 90 capsule 0    order for DME Equipment being ordered: Diabetes Shoes 1 Units 0    order for DME Equipment being ordered: Custom diabetic shoes 1 Units 0    order for DME Equipment being ordered: Bariatric Lift chair  Diagnosis - morbid obesity, CHF, Lymphedema    Fax to Ne from [facility] at [phone]. 1 Units 0    order for DME Equipment being ordered: Other: Velcro Compression stockings.   Treatment Diagnosis: bilateral lymphedema. 2 each 0    potassium chloride chen ER (KLOR-CON M20) 20 MEQ CR tablet Take 2 tablets (40 mEq) by mouth 2 times daily.      senna-docusate (SENEXON-S) 8.6-50 MG tablet Take 1 to 2 tablets by mouth 2 times daily as needed for constipation 180 tablet 0    spironolactone (ALDACTONE) 50 MG tablet Take 1 tablet (50 mg) by mouth daily.      tamsulosin (FLOMAX) 0.4 MG capsule Take 1 capsule (0.4 mg) by mouth daily 90 capsule 3    Torsemide 40 MG TABS Take 80 mg by mouth daily.      urea (CARMOL) 10 % external lotion Apply topically 2 times daily as needed for dry skin.      VITAMIN A PO Take 1 tablet by mouth daily.      vitamin B complex with vitamin C (VITAMIN  B COMPLEX) tablet Take 1 tablet by mouth daily 100 tablet 3    vitamin C (ASCORBIC ACID) 1000 MG TABS Take 1,000 mg by mouth daily      Vitamin D, Cholecalciferol, 25 MCG (1000 UT) CAPS Take 1 capsule by mouth daily.      vitamin E (VITAMIN E COMPLEX) 1000 units (450 mg) capsule Take 1 capsule (1,000 Units) by mouth daily 100 capsule 3    warfarin ANTICOAGULANT (COUMADIN) 5 MG tablet Take 5 mg (5 mg x 1) every Thu; 10 mg (5 mg x 2) all other days or as directed by the INR clinic. (Patient taking differently: Patient takes 10 mg (5 mg x 2) everyday or as directed by the INR clinic.) 180 tablet 3      Controlled medications:   not applicable/none     Past Medical History:   Past Medical History:   Diagnosis Date    Atrial fibrillation (H)     Basal cell carcinoma     Chronic anticoagulation     Diastolic  "heart failure (H)     Dyslipidemia     Essential hypertension     Morbid obesity (H)     Sleep-disordered breathing     Type 2 diabetes mellitus (H)      Physical Exam:   Vitals: /71   Pulse 91   Temp 98  F (36.7  C)   Resp 22   Ht 1.803 m (5' 11\")   Wt (!) 156.2 kg (344 lb 6.4 oz)   SpO2 96%   BMI 48.03 kg/m    BMI: Body mass index is 48.03 kg/m .  GENERAL APPEARANCE:  Alert, in no distress, pleasant, cooperative, oriented x 4  EYES: no discharge or mattering on lids or lashes noted  ENT:  moist mucous membranes, hearing acuity intact  NECK: supple, symmetrical  RESP: no respiratory distress, Lung sounds clear, patient is on room air  CV:  rate and rhythm regular, no murmur. Edema trace in bilateral lower extremities, teds are in place. VASCULAR: warm extremities without open areas.  ABDOMEN: obese, normal bowel sounds, soft, nontender.  M/S:   Gait and station: unsafe without assistance. no tenderness or swelling of the joints; able to move all extremities   SKIN:  Inspection and palpation of skin and subcutaneous tissue: skin warm, dry and intact without rashes  NEURO: no facial asymmetry, no speech deficits and able to follow directions, moves all extremities symmetrically  PSYCH:  insight and judgement intact, memory intact, affect and mood normal    SNF labs:   CBC RESULTS:   Recent Labs   Lab Test 11/05/24  0517 10/28/24  0612   WBC 7.3 8.3   RBC 4.94 4.87   HGB 11.5* 11.5*   HCT 37.4* 37.9*   MCV 76* 78   MCH 23.3* 23.6*   MCHC 30.7* 30.3*   RDW 17.2* 17.2*    279       Last Basic Metabolic Panel:  Recent Labs   Lab Test 11/26/24  0957 11/22/24  0447    136   POTASSIUM 4.6 3.8   CHLORIDE 94* 96*   CHERI 10.0 8.8   CO2 22 30*   BUN 16.0 19.4   CR 1.14 1.20*   * 108*       Liver Function Studies -   Recent Labs   Lab Test 10/25/24  0454 09/23/24  0638   PROTTOTAL 7.1 6.6   ALBUMIN 3.6 3.6   BILITOTAL 0.7 0.7   ALKPHOS 119 97   AST 27 45   ALT 12 17       TSH   Date Value Ref " Range Status   10/07/2024 0.93 0.30 - 4.20 uIU/mL Final   09/22/2024 0.79 0.30 - 4.20 uIU/mL Final   05/09/2022 3.98 0.40 - 4.00 mU/L Final   11/09/2020 2.69 0.40 - 4.00 mU/L Final   11/05/2019 0.75 0.40 - 4.00 mU/L Final   ]    Lab Results   Component Value Date    A1C 6.2 10/07/2024    A1C 6.5 09/26/2024    A1C 6.2 05/06/2021    A1C 5.9 11/09/2020         DISCHARGE PLAN:  Follow up labs: Inr will be due upon admission to the homecare agency.  Medical Follow Up:      Follow up with primary care provider in 1-2 weeks  Current [place] scheduled appointments:  Appointments in Next Year      Nov 26, 2024 10:00 AM  Discharge Summary with REAGAN Moreira CNP  [facility] ([facility] ) [phone]     Dec 09, 2024 8:20 AM  Telephone Visit with Lety Buenrostro PsyD  [facility] ([facility]) [phone]     Feb 05, 2025 11:15 AM  (Arrive by 11:00 AM)  Return Cardiology with Luis Garcia MD  [facility] [place] ([facility] ) [phone]           Discharge Services: Home Care:  Occupational Therapy, Physical Therapy, Registered Nurse, and Home Health Aide  Discharge Instructions Verbalized to Patient at Discharge:   Weigh yourself daily in the morning and keep a record. Call your primary clinic: a) if you are more short of breath, or b) if your weight changes more than 3 pounds in one day or more than 5 pounds in one week.   CPAP when sleeping; settings per usual practice.     TOTAL DISCHARGE TIME:   Greater than 30 minutes  Electronically signed by:  REAGAN Crandall CNP     Home care Face to Face documentation done in EPIC attached to Home care orders for [facility].

## 2024-11-26 NOTE — LETTER
11/26/2024      Clarke Moreira  [address]  [facility] [address]        [facility] GERIATRICS DISCHARGE SUMMARY  PATIENT'S NAME: Clarke Moreira  YOB: 1950  MEDICAL RECORD NUMBER:  [id number]  Place of Service where encounter took place:  [facility] (TCU) [[id number]]    PRIMARY CARE PROVIDER AND CLINIC RESPONSIBLE AFTER TRANSFER:   Henrry Shepard MD, [address] / [facility] [id number] Dr. Luis Nova   Non-G Provider     Transferring providers: REAGAN Crandall CNP, , MD  Recent Hospitalization/ED:  [facility] stay 9/22/24 to 10/3/24.  Date of SNF Admission: October 03, 2024  Date of SNF (anticipated) Discharge: November 27, 2024  Discharged to: previous independent home  Cognitive Scores: SLUMS: 28/30 (11/11/24)  Physical Function:   Bed Mobility Roll left and right = Independent  Sit to lying = Independent  Lying to sitting on side of bed = Independent  Transfers Sit to stand = Setup or clean-up assistance  Chair/bed-to-chair transfer = Setup or clean-up assistance  Toilet transfer = Setup or clean-up assistance  Car transfer = Not attempted due to environmental limitations  Ambulation Walk 10 Feet = Supervision or touching assistance  Walk 50 Feet with Two Turns = Supervision or touching assistance  Walk 150 feet = Supervision or touching assistance  Walking 10 feet on uneven surfaces = Not applicable  Other Ambulation Tasks: 1 step (curb) = Not applicable  Other Picking up object = Not attempted due to medical conditions or safety concerns  Wheelchair Mobility/Management  Does the resident use a wheelchair and/or scooter? = Yes  Wheel 50 feet with two turns = Independent  Indicate the type of wheelchair or scooter used = Manual  Wheel 150 feet = Independent  Indicate the type of wheelchair or scooter used = Manual  Dressing Upper body dressing = Independent  Lower body dressing =  Supervision or touching assistance  Putting on/taking off footwear = Independent  DME: No DME needed    Discharge Recommendations: Recommend looking into half-way for future change in environment due to mental health   aspects that affect his ability to care for himself. Home health PT recommended to continue working on functional   strength, balance, and endurance.    CODE STATUS/ADVANCE DIRECTIVES DISCUSSION:  No CPR- Do NOT Intubate   ALLERGIES: Seasonal allergies    NURSING FACILITY COURSE   Clarke Moreira  is a 74 year old  (1950) male with history of DMII, CKD, HFpEF, A fib, depression, THONG, chronic anemia, hypothyroidism, HLD, and BPH.  He was hospitalized 9/22 through 10/3/2024 at the [facility] for presyncopal episodes thought to be vasovagal.  Developed refeeding syndrome and A-fib with RVR and citrobacter bacteremia.  Went to the TCU.  While he was at home, his knees buckled and he had a fall on 10/25/2024.  Creatinine elevated to 1.77 up from baseline of 1.  Imaging without fractures, UA and blood cultures without infection.  Developed hypotension secondary to poor oral intake while at home, responded well to IV fluids.  Spironolactone discontinued and torsemide dose decreased.  Ejection fraction 55 to 60%.  Given his inability to care for himself at home, TCU was recommended and he was admitted to the above facility from  [facility]. Hospital stay 10/25/2024 through 11/2/2024..     Lower back pain    Bilateral knee hip pain   Has bilateral knee pain.  Did have several falls at home due to his knees buckling.  He is not interested in steroid injections but was Voltaren gel which he believes has been helpful.  Now with new onset of left ankle pain when he bears weight.  An x-ray was completed last week and did not have acute findings.  Did have orthopedic NP review who reported it looks like Clarke has chronic appearing deformity  of the left proximal phalanx and some bony projections from heel bone which can cause discomfort on the tendons and ligaments.  Suspect that the pain he is having when he bears weight is due to potential tendinitis.  Discussed with him today that if pain gets worse or does not improve, we can do a trial of ibuprofen or he may need further ortho follow up after TCU discharge.   -- Continue Voltaren gel 1%.  Apply 2 g to bilateral knees 3 times daily.  -- Ongoing PT OT for strengthening  -- Apply Biofreeze to left ankle twice daily once in the morning and another at at bedtime.     HFpEF  Hypertensive heart disease   Echo  10/25:  EF 55-60%.  Spironolactone was held during hospitalization and torsemide was decreased from 80 mg down to 40 but back to home doses at discharge. K+ has been low on bloodwork here but now in normal range after increases to daily dose.  His torsemide was decreased from 80 mg down to 60 mg and there has not been an increase to his weight or edema so we will continue with the lower dose.  --Continue with spironolactone 50 mg daily  --Torsemide 60 mg daily.   -- Continue potassium chloride to 40 mg BID  --Continue to monitor blood pressure and heart rate, adjust medications as needed.     Paroxysmal A fib  Long term anticoagulation   CHADSVASC 5. INR  2.1 on 11/25. Currently receiving 8 mg daily but will need to recheck his INR prior to discharge as homecare cannot see him until 11/30 which is a weekend.   -- Continue with metoprolol  mg daily.      Chronic hypercapnic respiratory failure   THONG  --continue with CPAP however he does not have it at the TCU and there is currently no way for him to get it from his home.      CKD III:   baseline Cr around 1. Elevated to 1.77 on hospital admission.   --Avoid nephrotoxic medications. Recheck BMP periodically     T2DM with neuropathy  obesity   A1c 6.2 10/7. Body mass index is 45.82 kg/m .  -  Metformin 500 mg bid     HLD:  - Continue PTA ASA and  statin     Depression  anxiety  Was feeling depressed during hospitalization.  Seen by psych.  He seems more optimistic upon admission to the TCU. Is nervous about returning home.      Chronic microcytic anemia  baseline  hgb 10.5-12. Iron low to 14 9/22/2024. HGB 11.5 here at the TCU  -Continue iron supplement daily  - may need out patient  GI work up       Hypothyroidism:   TSH 0.93 10/7/24  -- Continue synthroid      BPH  --Continue Flomax     Physical deconditioning  Secondary to recent hospitalization and underlying medical conditions.   --ongoing PT/OT for strengthening.   -- Social work for discharge planning      Discharge Medications:  MED REC REQUIRED  Post Medication Reconciliation Status: discharge medications reconciled and changed, per note/orders     Current Outpatient Medications   Medication Sig Dispense Refill     acetaminophen (TYLENOL) 325 MG tablet Take 2 tablets (650 mg) by mouth every 4 hours as needed for mild pain or other (and adjunct with moderate or severe pain or per patient request).       artificial saliva (BIOTENE MT) SOLN solution Take 1 spray by mouth 4 times daily as needed for dry mouth.       aspirin (ASA) 81 MG chewable tablet Take 1 tablet (81 mg) by mouth daily. 200 tablet 2     atorvastatin (LIPITOR) 40 MG tablet Take 1 tablet (40 mg) by mouth daily 90 tablet 3     CERTAVITE/ANTIOXIDANTS tablet TAKE ONE TABLET BY MOUTH ONE TIME DAILY 100 tablet 0     chlorhexidine (PERIDEX) 0.12 % solution Swish and spit 15 mLs in mouth 2 times daily.       ferrous sulfate (FEROSUL) 325 (65 Fe) MG tablet Take 1 tablet (325 mg) by mouth every other day. For iron deficiency anemia 60 tablet 1     levothyroxine (SYNTHROID/LEVOTHROID) 175 MCG tablet Take 1 tablet (175 mcg) by mouth every morning (before breakfast) 90 tablet 3     metFORMIN (GLUCOPHAGE) 500 MG tablet Take 1 tablet (500 mg) by mouth 2 times daily (with meals). 180 tablet 3     metoprolol succinate ER (TOPROL XL) 100 MG 24 hr tablet  Take 1 tablet (100 mg) by mouth daily.       Omega-3 Fatty Acids (EQL FISH OIL) 1000 MG CAPS Take 1 capsule by mouth daily 90 capsule 0     order for DME Equipment being ordered: Diabetes Shoes 1 Units 0     order for DME Equipment being ordered: Custom diabetic shoes 1 Units 0     order for DME Equipment being ordered: Bariatric Lift chair  Diagnosis - morbid obesity, CHF, Lymphedema    Fax to Ne from [employer] at [phone]. 1 Units 0     order for DME Equipment being ordered: Other: Velcro Compression stockings.   Treatment Diagnosis: bilateral lymphedema. 2 each 0     potassium chloride chen ER (KLOR-CON M20) 20 MEQ CR tablet Take 2 tablets (40 mEq) by mouth 2 times daily.       senna-docusate (SENEXON-S) 8.6-50 MG tablet Take 1 to 2 tablets by mouth 2 times daily as needed for constipation 180 tablet 0     spironolactone (ALDACTONE) 50 MG tablet Take 1 tablet (50 mg) by mouth daily.       tamsulosin (FLOMAX) 0.4 MG capsule Take 1 capsule (0.4 mg) by mouth daily 90 capsule 3     Torsemide 40 MG TABS Take 80 mg by mouth daily.       urea (CARMOL) 10 % external lotion Apply topically 2 times daily as needed for dry skin.       VITAMIN A PO Take 1 tablet by mouth daily.       vitamin B complex with vitamin C (VITAMIN  B COMPLEX) tablet Take 1 tablet by mouth daily 100 tablet 3     vitamin C (ASCORBIC ACID) 1000 MG TABS Take 1,000 mg by mouth daily       Vitamin D, Cholecalciferol, 25 MCG (1000 UT) CAPS Take 1 capsule by mouth daily.       vitamin E (VITAMIN E COMPLEX) 1000 units (450 mg) capsule Take 1 capsule (1,000 Units) by mouth daily 100 capsule 3     warfarin ANTICOAGULANT (COUMADIN) 5 MG tablet Take 5 mg (5 mg x 1) every Thu; 10 mg (5 mg x 2) all other days or as directed by the INR clinic. (Patient taking differently: Patient takes 10 mg (5 mg x 2) everyday or as directed by the INR clinic.) 180 tablet 3      Controlled medications:   not applicable/none     Past Medical History:   Past Medical  "History:   Diagnosis Date     Atrial fibrillation (H)      Basal cell carcinoma      Chronic anticoagulation      Diastolic heart failure (H)      Dyslipidemia      Essential hypertension      Morbid obesity (H)      Sleep-disordered breathing      Type 2 diabetes mellitus (H)      Physical Exam:   Vitals: /71   Pulse 91   Temp 98  F (36.7  C)   Resp 22   Ht 1.803 m (5' 11\")   Wt (!) 156.2 kg (344 lb 6.4 oz)   SpO2 96%   BMI 48.03 kg/m    BMI: Body mass index is 48.03 kg/m .  GENERAL APPEARANCE:  Alert, in no distress, pleasant, cooperative, oriented x 4  EYES: no discharge or mattering on lids or lashes noted  ENT:  moist mucous membranes, hearing acuity intact  NECK: supple, symmetrical  RESP: no respiratory distress, Lung sounds clear, patient is on room air  CV:  rate and rhythm regular, no murmur. Edema trace in bilateral lower extremities, teds are in place. VASCULAR: warm extremities without open areas.  ABDOMEN: obese, normal bowel sounds, soft, nontender.  M/S:   Gait and station: unsafe without assistance. no tenderness or swelling of the joints; able to move all extremities   SKIN:  Inspection and palpation of skin and subcutaneous tissue: skin warm, dry and intact without rashes  NEURO: no facial asymmetry, no speech deficits and able to follow directions, moves all extremities symmetrically  PSYCH:  insight and judgement intact, memory intact, affect and mood normal    SNF labs:   CBC RESULTS:   Recent Labs   Lab Test 11/05/24  0517 10/28/24  0612   WBC 7.3 8.3   RBC 4.94 4.87   HGB 11.5* 11.5*   HCT 37.4* 37.9*   MCV 76* 78   MCH 23.3* 23.6*   MCHC 30.7* 30.3*   RDW 17.2* 17.2*    279       Last Basic Metabolic Panel:  Recent Labs   Lab Test 11/26/24  0957 11/22/24  0447    136   POTASSIUM 4.6 3.8   CHLORIDE 94* 96*   CHERI 10.0 8.8   CO2 22 30*   BUN 16.0 19.4   CR 1.14 1.20*   * 108*       Liver Function Studies -   Recent Labs   Lab Test 10/25/24  0454 09/23/24  0638 "   PROTTOTAL 7.1 6.6   ALBUMIN 3.6 3.6   BILITOTAL 0.7 0.7   ALKPHOS 119 97   AST 27 45   ALT 12 17       TSH   Date Value Ref Range Status   10/07/2024 0.93 0.30 - 4.20 uIU/mL Final   09/22/2024 0.79 0.30 - 4.20 uIU/mL Final   05/09/2022 3.98 0.40 - 4.00 mU/L Final   11/09/2020 2.69 0.40 - 4.00 mU/L Final   11/05/2019 0.75 0.40 - 4.00 mU/L Final   ]    Lab Results   Component Value Date    A1C 6.2 10/07/2024    A1C 6.5 09/26/2024    A1C 6.2 05/06/2021    A1C 5.9 11/09/2020         DISCHARGE PLAN:  Follow up labs: Inr will be due upon admission to the homecare agency.  Medical Follow Up:      Follow up with primary care provider in 1-2 weeks  [facility] scheduled appointments:  Appointments in Next Year      Nov 26, 2024 10:00 AM  Discharge Summary with REAGAN Moreira CNP  [facility] ([facility] ) [phone]     Dec 09, 2024 8:20 AM  Telephone Visit with Lety Buenrostro PsyD  [facility] ([facility]) [phone]     Feb 05, 2025 11:15 AM  (Arrive by 11:00 AM)  Return Cardiology with Luis Garcia MD  [facility] ([facility] ) [phone]           Discharge Services: Home Care:  Occupational Therapy, Physical Therapy, Registered Nurse, and Home Health Aide  Discharge Instructions Verbalized to Patient at Discharge:   Weigh yourself daily in the morning and keep a record. Call your primary clinic: a) if you are more short of breath, or b) if your weight changes more than 3 pounds in one day or more than 5 pounds in one week.   CPAP when sleeping; settings per usual practice.     TOTAL DISCHARGE TIME:   Greater than 30 minutes  Electronically signed by:  REAGAN Crandall CNP     Home care Face to Face documentation done in [facility] attached to Home care orders for [facility].               Sincerely,        Oma Ram,  APRN CNP

## 2024-12-02 ENCOUNTER — TELEPHONE (OUTPATIENT)
Dept: ANTICOAGULATION | Facility: CLINIC | Age: 74
End: 2024-12-02
Payer: COMMERCIAL

## 2024-12-02 ENCOUNTER — ANTICOAGULATION THERAPY VISIT (OUTPATIENT)
Dept: ANTICOAGULATION | Facility: CLINIC | Age: 74
End: 2024-12-02
Payer: COMMERCIAL

## 2024-12-02 DIAGNOSIS — I48.20 CHRONIC ATRIAL FIBRILLATION (H): Primary | ICD-10-CM

## 2024-12-02 DIAGNOSIS — I48.0 PAROXYSMAL ATRIAL FIBRILLATION (H): ICD-10-CM

## 2024-12-02 DIAGNOSIS — Z79.01 LONG TERM (CURRENT) USE OF ANTICOAGULANTS: ICD-10-CM

## 2024-12-02 LAB — INR (EXTERNAL): 3.1 (ref 0.9–1.1)

## 2024-12-02 NOTE — TELEPHONE ENCOUNTER
[facility] calendar updated with warfarin while in TCU per TCU provider notes in pt chart.     Sree Meehan RN

## 2024-12-02 NOTE — PROGRESS NOTES
ANTICOAGULATION MANAGEMENT     Clarke Moreira 74 year old male is on warfarin with supratherapeutic INR result. (Goal INR 2.0-3.0)    Recent labs: (last 7 days)     12/02/24  0000   INR 3.1*       ASSESSMENT     Source(s): Chart Review, Patient/Caregiver Call, and Home Care/Facility Nurse     Warfarin doses taken:  Pt was discharged from TCU on 11/27/24 on 8mg daily but pt only had 5mg tablets of warfarin in home. Pt states that since discharge he has been take one 5mg tablet and cutting another 5mg tablet to make 8mg of warfarin. He states that he is scheduled to be receiving 3mg tablets in the mail today.   Diet: No new diet changes identified  Medication/supplement changes:  Torsemide dose was reduced from 100mg prior to hospitalization and TCU stay down to 60mg daily.   New illness, injury, or hospitalization: Yes: Hospitalized 10/25/24-10/30/24 for mechanical fall and inability to care for himself, readmission 10/30/24-11/2/24 then transferred to TCU and discharged from TCU to home with home care services on 11/27/24. Pt was started with [facility] on 12/2/24 and plans to receive nursing, PT and OT services   Signs or symptoms of bleeding or clotting: No  Previous result: Therapeutic last visit; previously outside of goal range  Additional findings:  [facility] calendar updated with INRs/warfarin dosing per TCU provider notes.            PLAN     Recommended plan for temporary change(s) affecting INR     Dosing Instructions:  Continue with warfarin dosing pt was discharged from TCU on  with next INR in 3 days       Summary  As of 12/2/2024      Full warfarin instructions:  8 mg every day   Next INR check:  12/5/2024               Telephone call with Angel who verbalizes understanding and agrees to plan  Detailed voice message left for [facility] home care nurse with dosing instructions and follow up date.     Orders given to  Homecare nurse/facility to recheck    Education provided: Taking warfarin:  prescribed tablet strength and color  Goal range and lab monitoring: goal range and significance of current result, Importance of therapeutic range, and Importance of following up at instructed interval    Plan made with [facility] Pharmacist Darby Meehan RN  12/2/2024  Anticoagulation Clinic  [facility] for routing messages: benjamin ALBRIGHT  [facility] patient phone line: [phone]        _______________________________________________________________________     Anticoagulation Episode Summary       Current INR goal:  2.0-3.0   TTR:  61.4% (10.2 mo)   Target end date:  Indefinite   Send INR reminders to:  JOVITA ALBRIGHT    Indications    Chronic atrial fibrillation (H) [I48.20]  Long term (current) use of anticoagulants [Z79.01]  Paroxysmal atrial fibrillation (H) [I48.0]             [facility]:  [facility]             Anticoagulation Care Providers       Provider Role Specialty Phone number    Matthias Sanchez MD Referring Family Medicine [phone]    Farideh Dasilva MD Referring Family Medicine [phone]

## 2024-12-02 NOTE — TELEPHONE ENCOUNTER
Received a call from [facility] asking if patient is due for INR.  Patient recently discharged from TCU.    Last INR done 11/25 2.1. current dose 8 mg daily.    Recommend INR check today.    Adrienne Skelton, RN  Anticoagulation Nurse - [place] [facility], [place]

## 2024-12-03 ENCOUNTER — TELEPHONE (OUTPATIENT)
Dept: FAMILY MEDICINE | Facility: CLINIC | Age: 74
End: 2024-12-03
Payer: COMMERCIAL

## 2024-12-03 NOTE — TELEPHONE ENCOUNTER
[facility] phone call message- general phone call:    [facility]    Action Desired: Skilled nursing twice a week for one, once a week for two weeks, for disease management and INR. Patient will be discharge from skilled nursing and have behavioral health take over his care. PT/OT eval and treat, home health aide once a week for 8 weeks.     Return call needed: Yes    OK to leave a message on voice mail? Yes, secure VM, ok to leave VO on VM    Advised patient response may take up to 2 business days: Yes    Primary language: English      needed? No    Call taken on December 3, 2024 at 8:32 AM by ALFREDA PEREZ    Route to [facility] TRIAGE

## 2024-12-05 ENCOUNTER — ANTICOAGULATION THERAPY VISIT (OUTPATIENT)
Dept: ANTICOAGULATION | Facility: CLINIC | Age: 74
End: 2024-12-05
Payer: COMMERCIAL

## 2024-12-05 ENCOUNTER — TELEPHONE (OUTPATIENT)
Dept: ANTICOAGULATION | Facility: CLINIC | Age: 74
End: 2024-12-05
Payer: COMMERCIAL

## 2024-12-05 DIAGNOSIS — I48.19 PERSISTENT ATRIAL FIBRILLATION (H): ICD-10-CM

## 2024-12-05 DIAGNOSIS — I48.0 PAROXYSMAL ATRIAL FIBRILLATION (H): ICD-10-CM

## 2024-12-05 DIAGNOSIS — I48.20 CHRONIC ATRIAL FIBRILLATION (H): Primary | ICD-10-CM

## 2024-12-05 DIAGNOSIS — Z79.01 LONG TERM (CURRENT) USE OF ANTICOAGULANTS: ICD-10-CM

## 2024-12-05 LAB — INR (EXTERNAL): 2.8

## 2024-12-05 RX ORDER — WARFARIN SODIUM 3 MG/1
TABLET ORAL
Qty: 90 TABLET | Refills: 1 | Status: SHIPPED | OUTPATIENT
Start: 2024-12-05

## 2024-12-05 RX ORDER — WARFARIN SODIUM 5 MG/1
TABLET ORAL
Qty: 90 TABLET | Refills: 1 | Status: SHIPPED | OUTPATIENT
Start: 2024-12-05

## 2024-12-05 NOTE — TELEPHONE ENCOUNTER
Incoming call with VM from [facility] Weeks with INR result. INR today is 2.8. Patient also needs a warfarin refill.     TE routed to [facility] who currently manages INR results.     Please call [facility] Weeks back at [phone].    LILLIANA RIVERA RN  Anticoagulation Clinic  [phone]

## 2024-12-05 NOTE — PROGRESS NOTES
Note  Incoming call with VM from [facility] with INR result. INR today is 2.8. Patient also needs a warfarin refill.      TE routed to [facility] clinic who currently manages INR results.      Please call TORSTEN Weeks back at [phone].     LILLIANA RIVERA RN  Anticoagulation Clinic  [phone]           ANTICOAGULATION MANAGEMENT     Clarke Moreira 74 year old male is on warfarin with therapeutic INR result. (Goal INR 2.0-3.0)    Recent labs: (last 7 days)     12/05/24  0000   INR 2.8       ASSESSMENT     Source(s): Chart Review and Home Care/Facility Nurse - [facility] from [facility]    Warfarin doses taken: Warfarin taken as instructed - patient received 1 weeks supply of warfarin 3 mg tablet from TCU discharge in the mail on 12/2/24 and has been taking 5 mg and 3 mg tablet = 8 mg daily since 12/2/24.    Diet: No new diet changes identified  Medication/supplement changes:  recent decreased in torsemide from 100 mg to 60 mg.   New illness, injury, or hospitalization: No  Signs or symptoms of bleeding or clotting: No  Previous result: Supratherapeutic  Additional findings:  Will continue with 8 mg daily.  Prescription for warfarin 5 mg and 3 mg tablet sent to pharmacy as requested.         PLAN     Recommended plan for ongoing change(s) affecting INR     Dosing Instructions: Continue your current warfarin dose with next INR in 5 days at next home care visit.    Summary  As of 12/5/2024      Full warfarin instructions:  8 mg every day   Next INR check:  12/10/2024               Telephone call with Desi home care nurse who verbalizes understanding and agrees to plan    Orders given to  Homecare nurse/facility to recheck    Education provided: Please call back if any changes to your diet, medications or how you've been taking warfarin  Taking warfarin: prescribed tablet strength and color    Plan made per [facility] anticoagulation protocol    Quynh Trinidad RN  12/5/2024  Anticoagulation  Clinic  [facility] for routing messages: p KAYLEIGH ALBRIGHT  [facility] patient phone line: [phone]        _______________________________________________________________________     Anticoagulation Episode Summary       Current INR goal:  2.0-3.0   TTR:  61.4% (10.3 mo)   Target end date:  Indefinite   Send INR reminders to:  KAYLEIGH ALBRIGHT    Indications    Chronic atrial fibrillation (H) [I48.20]  Long term (current) use of anticoagulants [Z79.01]  Paroxysmal atrial fibrillation (H) [I48.0]             Comments:  Return to [facility] when [facility] Care discharges             Anticoagulation Care Providers       Provider Role Specialty Phone number    Matthias Sanchez MD Referring Family Medicine [phone]    Farideh Dasilva MD Referring Family Medicine [phone]

## 2024-12-07 ENCOUNTER — APPOINTMENT (OUTPATIENT)
Dept: CT IMAGING | Facility: CLINIC | Age: 74
DRG: 312 | End: 2024-12-07
Attending: EMERGENCY MEDICINE
Payer: COMMERCIAL

## 2024-12-07 ENCOUNTER — APPOINTMENT (OUTPATIENT)
Dept: GENERAL RADIOLOGY | Facility: CLINIC | Age: 74
DRG: 312 | End: 2024-12-07
Payer: COMMERCIAL

## 2024-12-07 ENCOUNTER — HOSPITAL ENCOUNTER (INPATIENT)
Facility: CLINIC | Age: 74
DRG: 312 | End: 2024-12-07
Attending: EMERGENCY MEDICINE | Admitting: INTERNAL MEDICINE
Payer: COMMERCIAL

## 2024-12-07 DIAGNOSIS — E83.42 HYPOMAGNESEMIA: ICD-10-CM

## 2024-12-07 DIAGNOSIS — R29.6 MULTIPLE FALLS: ICD-10-CM

## 2024-12-07 DIAGNOSIS — R26.81 UNSTEADINESS ON FEET: ICD-10-CM

## 2024-12-07 DIAGNOSIS — Z79.01 LONG TERM (CURRENT) USE OF ANTICOAGULANTS: ICD-10-CM

## 2024-12-07 DIAGNOSIS — I48.20 CHRONIC ATRIAL FIBRILLATION (H): ICD-10-CM

## 2024-12-07 DIAGNOSIS — E87.6 HYPOKALEMIA: ICD-10-CM

## 2024-12-07 DIAGNOSIS — R53.1 WEAKNESS: Primary | ICD-10-CM

## 2024-12-07 LAB
ALBUMIN SERPL BCG-MCNC: 3.6 G/DL (ref 3.5–5.2)
ALBUMIN UR-MCNC: NEGATIVE MG/DL
ALP SERPL-CCNC: 103 U/L (ref 40–150)
ALT SERPL W P-5'-P-CCNC: 14 U/L (ref 0–70)
ANION GAP SERPL CALCULATED.3IONS-SCNC: 16 MMOL/L (ref 7–15)
APPEARANCE UR: CLEAR
AST SERPL W P-5'-P-CCNC: 22 U/L (ref 0–45)
BASOPHILS # BLD AUTO: 0 10E3/UL (ref 0–0.2)
BASOPHILS NFR BLD AUTO: 0 %
BILIRUB SERPL-MCNC: 0.4 MG/DL
BILIRUB UR QL STRIP: NEGATIVE
BUN SERPL-MCNC: 14 MG/DL (ref 8–23)
CALCIUM SERPL-MCNC: 9.1 MG/DL (ref 8.8–10.4)
CHLORIDE SERPL-SCNC: 92 MMOL/L (ref 98–107)
CK SERPL-CCNC: 41 U/L (ref 39–308)
COLOR UR AUTO: NORMAL
CREAT SERPL-MCNC: 1.21 MG/DL (ref 0.67–1.17)
EGFRCR SERPLBLD CKD-EPI 2021: 63 ML/MIN/1.73M2
EOSINOPHIL # BLD AUTO: 0 10E3/UL (ref 0–0.7)
EOSINOPHIL NFR BLD AUTO: 0 %
ERYTHROCYTE [DISTWIDTH] IN BLOOD BY AUTOMATED COUNT: 18.9 % (ref 10–15)
GLUCOSE SERPL-MCNC: 152 MG/DL (ref 70–99)
GLUCOSE UR STRIP-MCNC: NEGATIVE MG/DL
HCO3 SERPL-SCNC: 25 MMOL/L (ref 22–29)
HCT VFR BLD AUTO: 38.8 % (ref 40–53)
HGB BLD-MCNC: 11.7 G/DL (ref 13.3–17.7)
HGB UR QL STRIP: NEGATIVE
HOLD SPECIMEN: NORMAL
IMM GRANULOCYTES # BLD: 0 10E3/UL
IMM GRANULOCYTES NFR BLD: 0 %
INR PPP: 3.45 (ref 0.85–1.15)
INR PPP: 3.49 (ref 0.85–1.15)
KETONES UR STRIP-MCNC: NEGATIVE MG/DL
LEUKOCYTE ESTERASE UR QL STRIP: NEGATIVE
LYMPHOCYTES # BLD AUTO: 1 10E3/UL (ref 0.8–5.3)
LYMPHOCYTES NFR BLD AUTO: 10 %
MAGNESIUM SERPL-MCNC: 1.6 MG/DL (ref 1.7–2.3)
MCH RBC QN AUTO: 23.8 PG (ref 26.5–33)
MCHC RBC AUTO-ENTMCNC: 30.2 G/DL (ref 31.5–36.5)
MCV RBC AUTO: 79 FL (ref 78–100)
MONOCYTES # BLD AUTO: 1.1 10E3/UL (ref 0–1.3)
MONOCYTES NFR BLD AUTO: 11 %
NEUTROPHILS # BLD AUTO: 8.5 10E3/UL (ref 1.6–8.3)
NEUTROPHILS NFR BLD AUTO: 79 %
NITRATE UR QL: NEGATIVE
NRBC # BLD AUTO: 0 10E3/UL
NRBC BLD AUTO-RTO: 0 /100
PH UR STRIP: 6 [PH] (ref 5–7)
PHOSPHATE SERPL-MCNC: 1.8 MG/DL (ref 2.5–4.5)
PLATELET # BLD AUTO: 342 10E3/UL (ref 150–450)
POTASSIUM SERPL-SCNC: 2.6 MMOL/L (ref 3.4–5.3)
POTASSIUM SERPL-SCNC: 2.9 MMOL/L (ref 3.4–5.3)
PROT SERPL-MCNC: 6.7 G/DL (ref 6.4–8.3)
RBC # BLD AUTO: 4.91 10E6/UL (ref 4.4–5.9)
RBC URINE: 1 /HPF
SODIUM SERPL-SCNC: 133 MMOL/L (ref 135–145)
SP GR UR STRIP: 1.03 (ref 1–1.03)
SQUAMOUS EPITHELIAL: <1 /HPF
UROBILINOGEN UR STRIP-MCNC: NORMAL MG/DL
WBC # BLD AUTO: 10.7 10E3/UL (ref 4–11)
WBC URINE: 1 /HPF

## 2024-12-07 PROCEDURE — 82565 ASSAY OF CREATININE: CPT | Performed by: EMERGENCY MEDICINE

## 2024-12-07 PROCEDURE — 85004 AUTOMATED DIFF WBC COUNT: CPT | Performed by: EMERGENCY MEDICINE

## 2024-12-07 PROCEDURE — 250N000011 HC RX IP 250 OP 636: Performed by: EMERGENCY MEDICINE

## 2024-12-07 PROCEDURE — 999N000147 HC STATISTIC PT IP EVAL DEFER

## 2024-12-07 PROCEDURE — 250N000013 HC RX MED GY IP 250 OP 250 PS 637

## 2024-12-07 PROCEDURE — 36415 COLL VENOUS BLD VENIPUNCTURE: CPT | Performed by: INTERNAL MEDICINE

## 2024-12-07 PROCEDURE — 73600 X-RAY EXAM OF ANKLE: CPT | Mod: 50

## 2024-12-07 PROCEDURE — 82947 ASSAY GLUCOSE BLOOD QUANT: CPT | Performed by: EMERGENCY MEDICINE

## 2024-12-07 PROCEDURE — 81003 URINALYSIS AUTO W/O SCOPE: CPT | Performed by: EMERGENCY MEDICINE

## 2024-12-07 PROCEDURE — 99285 EMERGENCY DEPT VISIT HI MDM: CPT | Mod: 25 | Performed by: EMERGENCY MEDICINE

## 2024-12-07 PROCEDURE — 250N000013 HC RX MED GY IP 250 OP 250 PS 637: Performed by: EMERGENCY MEDICINE

## 2024-12-07 PROCEDURE — 70496 CT ANGIOGRAPHY HEAD: CPT | Mod: 26 | Performed by: RADIOLOGY

## 2024-12-07 PROCEDURE — 85610 PROTHROMBIN TIME: CPT

## 2024-12-07 PROCEDURE — 73030 X-RAY EXAM OF SHOULDER: CPT | Mod: 26 | Performed by: RADIOLOGY

## 2024-12-07 PROCEDURE — 83735 ASSAY OF MAGNESIUM: CPT | Performed by: EMERGENCY MEDICINE

## 2024-12-07 PROCEDURE — 36415 COLL VENOUS BLD VENIPUNCTURE: CPT

## 2024-12-07 PROCEDURE — 81001 URINALYSIS AUTO W/SCOPE: CPT | Performed by: EMERGENCY MEDICINE

## 2024-12-07 PROCEDURE — 36415 COLL VENOUS BLD VENIPUNCTURE: CPT | Performed by: EMERGENCY MEDICINE

## 2024-12-07 PROCEDURE — 84132 ASSAY OF SERUM POTASSIUM: CPT | Performed by: INTERNAL MEDICINE

## 2024-12-07 PROCEDURE — 258N000003 HC RX IP 258 OP 636

## 2024-12-07 PROCEDURE — 99207 PR APP CREDIT; MD BILLING SHARED VISIT: CPT | Mod: FS

## 2024-12-07 PROCEDURE — 250N000013 HC RX MED GY IP 250 OP 250 PS 637: Performed by: INTERNAL MEDICINE

## 2024-12-07 PROCEDURE — 84100 ASSAY OF PHOSPHORUS: CPT

## 2024-12-07 PROCEDURE — 82550 ASSAY OF CK (CPK): CPT

## 2024-12-07 PROCEDURE — 99418 PROLNG IP/OBS E/M EA 15 MIN: CPT | Mod: FS | Performed by: INTERNAL MEDICINE

## 2024-12-07 PROCEDURE — 73030 X-RAY EXAM OF SHOULDER: CPT | Mod: RT

## 2024-12-07 PROCEDURE — 99285 EMERGENCY DEPT VISIT HI MDM: CPT | Performed by: EMERGENCY MEDICINE

## 2024-12-07 PROCEDURE — 70498 CT ANGIOGRAPHY NECK: CPT | Mod: 26 | Performed by: RADIOLOGY

## 2024-12-07 PROCEDURE — 250N000009 HC RX 250: Performed by: EMERGENCY MEDICINE

## 2024-12-07 PROCEDURE — 85610 PROTHROMBIN TIME: CPT | Performed by: EMERGENCY MEDICINE

## 2024-12-07 PROCEDURE — 70450 CT HEAD/BRAIN W/O DYE: CPT

## 2024-12-07 PROCEDURE — 70496 CT ANGIOGRAPHY HEAD: CPT

## 2024-12-07 PROCEDURE — 73600 X-RAY EXAM OF ANKLE: CPT | Mod: 26 | Performed by: RADIOLOGY

## 2024-12-07 PROCEDURE — 96365 THER/PROPH/DIAG IV INF INIT: CPT | Mod: 59 | Performed by: EMERGENCY MEDICINE

## 2024-12-07 PROCEDURE — 120N000002 HC R&B MED SURG/OB UMMC

## 2024-12-07 PROCEDURE — 99223 1ST HOSP IP/OBS HIGH 75: CPT | Mod: FS | Performed by: INTERNAL MEDICINE

## 2024-12-07 PROCEDURE — 85041 AUTOMATED RBC COUNT: CPT | Performed by: EMERGENCY MEDICINE

## 2024-12-07 PROCEDURE — 73560 X-RAY EXAM OF KNEE 1 OR 2: CPT | Mod: 26 | Performed by: RADIOLOGY

## 2024-12-07 PROCEDURE — 73560 X-RAY EXAM OF KNEE 1 OR 2: CPT | Mod: 50

## 2024-12-07 RX ORDER — LIDOCAINE 40 MG/G
CREAM TOPICAL
Status: DISCONTINUED | OUTPATIENT
Start: 2024-12-07 | End: 2024-12-14 | Stop reason: HOSPADM

## 2024-12-07 RX ORDER — ACETAMINOPHEN 325 MG/1
650 TABLET ORAL EVERY 4 HOURS PRN
Status: DISCONTINUED | OUTPATIENT
Start: 2024-12-07 | End: 2024-12-14 | Stop reason: HOSPADM

## 2024-12-07 RX ORDER — AMOXICILLIN 250 MG
1 CAPSULE ORAL 2 TIMES DAILY PRN
Status: DISCONTINUED | OUTPATIENT
Start: 2024-12-07 | End: 2024-12-07

## 2024-12-07 RX ORDER — MAGNESIUM OXIDE 400 MG/1
400 TABLET ORAL DAILY
Status: DISCONTINUED | OUTPATIENT
Start: 2024-12-07 | End: 2024-12-14 | Stop reason: HOSPADM

## 2024-12-07 RX ORDER — AMOXICILLIN 250 MG
2 CAPSULE ORAL 2 TIMES DAILY PRN
Status: DISCONTINUED | OUTPATIENT
Start: 2024-12-07 | End: 2024-12-07

## 2024-12-07 RX ORDER — SPIRONOLACTONE 25 MG/1
50 TABLET ORAL DAILY
Status: DISCONTINUED | OUTPATIENT
Start: 2024-12-08 | End: 2024-12-14 | Stop reason: HOSPADM

## 2024-12-07 RX ORDER — SALIVA STIMULANT COMB. NO.3
1 SPRAY, NON-AEROSOL (ML) MUCOUS MEMBRANE 4 TIMES DAILY PRN
Status: DISCONTINUED | OUTPATIENT
Start: 2024-12-07 | End: 2024-12-14 | Stop reason: HOSPADM

## 2024-12-07 RX ORDER — WARFARIN SODIUM 5 MG/1
5 TABLET ORAL
Status: COMPLETED | OUTPATIENT
Start: 2024-12-07 | End: 2024-12-07

## 2024-12-07 RX ORDER — ACETAMINOPHEN 650 MG/1
650 SUPPOSITORY RECTAL EVERY 4 HOURS PRN
Status: DISCONTINUED | OUTPATIENT
Start: 2024-12-07 | End: 2024-12-14 | Stop reason: HOSPADM

## 2024-12-07 RX ORDER — POTASSIUM CHLORIDE 20MEQ/15ML
40 LIQUID (ML) ORAL ONCE
Status: COMPLETED | OUTPATIENT
Start: 2024-12-07 | End: 2024-12-07

## 2024-12-07 RX ORDER — CALCIUM CARBONATE 500 MG/1
1000 TABLET, CHEWABLE ORAL 4 TIMES DAILY PRN
Status: DISCONTINUED | OUTPATIENT
Start: 2024-12-07 | End: 2024-12-14 | Stop reason: HOSPADM

## 2024-12-07 RX ORDER — IOPAMIDOL 755 MG/ML
100 INJECTION, SOLUTION INTRAVASCULAR ONCE
Status: COMPLETED | OUTPATIENT
Start: 2024-12-07 | End: 2024-12-07

## 2024-12-07 RX ORDER — POTASSIUM CHLORIDE 7.45 MG/ML
10 INJECTION INTRAVENOUS ONCE
Status: COMPLETED | OUTPATIENT
Start: 2024-12-07 | End: 2024-12-07

## 2024-12-07 RX ORDER — ATORVASTATIN CALCIUM 40 MG/1
40 TABLET, FILM COATED ORAL DAILY
Status: DISCONTINUED | OUTPATIENT
Start: 2024-12-08 | End: 2024-12-14 | Stop reason: HOSPADM

## 2024-12-07 RX ORDER — ASCORBIC ACID 500 MG
1000 TABLET ORAL DAILY
Status: DISCONTINUED | OUTPATIENT
Start: 2024-12-08 | End: 2024-12-14 | Stop reason: HOSPADM

## 2024-12-07 RX ORDER — VITAMIN B COMPLEX
25 TABLET ORAL DAILY
Status: DISCONTINUED | OUTPATIENT
Start: 2024-12-08 | End: 2024-12-14 | Stop reason: HOSPADM

## 2024-12-07 RX ORDER — PROCHLORPERAZINE MALEATE 5 MG/1
5 TABLET ORAL EVERY 6 HOURS PRN
Status: DISCONTINUED | OUTPATIENT
Start: 2024-12-07 | End: 2024-12-14 | Stop reason: HOSPADM

## 2024-12-07 RX ORDER — TAMSULOSIN HYDROCHLORIDE 0.4 MG/1
0.4 CAPSULE ORAL DAILY
Status: DISCONTINUED | OUTPATIENT
Start: 2024-12-08 | End: 2024-12-14 | Stop reason: HOSPADM

## 2024-12-07 RX ORDER — FERROUS SULFATE 325(65) MG
325 TABLET ORAL EVERY OTHER DAY
Status: DISCONTINUED | OUTPATIENT
Start: 2024-12-08 | End: 2024-12-14 | Stop reason: HOSPADM

## 2024-12-07 RX ORDER — METOPROLOL SUCCINATE 50 MG/1
100 TABLET, EXTENDED RELEASE ORAL DAILY
Status: DISCONTINUED | OUTPATIENT
Start: 2024-12-08 | End: 2024-12-14 | Stop reason: HOSPADM

## 2024-12-07 RX ORDER — METOPROLOL SUCCINATE 25 MG/1
100 TABLET, EXTENDED RELEASE ORAL DAILY
Status: DISCONTINUED | OUTPATIENT
Start: 2024-12-07 | End: 2024-12-07

## 2024-12-07 RX ORDER — MAGNESIUM SULFATE 1 G/100ML
1 INJECTION INTRAVENOUS ONCE
Status: COMPLETED | OUTPATIENT
Start: 2024-12-07 | End: 2024-12-07

## 2024-12-07 RX ORDER — ASPIRIN 81 MG/1
81 TABLET, CHEWABLE ORAL DAILY
Status: DISCONTINUED | OUTPATIENT
Start: 2024-12-08 | End: 2024-12-14 | Stop reason: HOSPADM

## 2024-12-07 RX ORDER — TORSEMIDE 20 MG/1
80 TABLET ORAL DAILY
Status: DISCONTINUED | OUTPATIENT
Start: 2024-12-08 | End: 2024-12-07

## 2024-12-07 RX ORDER — FAMOTIDINE 20 MG
1 TABLET ORAL DAILY
Status: DISCONTINUED | OUTPATIENT
Start: 2024-12-07 | End: 2024-12-07

## 2024-12-07 RX ORDER — AMOXICILLIN 250 MG
1 CAPSULE ORAL AT BEDTIME
Status: DISCONTINUED | OUTPATIENT
Start: 2024-12-07 | End: 2024-12-14 | Stop reason: HOSPADM

## 2024-12-07 RX ORDER — CHLORHEXIDINE GLUCONATE ORAL RINSE 1.2 MG/ML
15 SOLUTION DENTAL 2 TIMES DAILY
Status: DISCONTINUED | OUTPATIENT
Start: 2024-12-07 | End: 2024-12-14 | Stop reason: HOSPADM

## 2024-12-07 RX ORDER — POLYETHYLENE GLYCOL 3350 17 G/17G
17 POWDER, FOR SOLUTION ORAL 2 TIMES DAILY PRN
Status: DISCONTINUED | OUTPATIENT
Start: 2024-12-07 | End: 2024-12-14 | Stop reason: HOSPADM

## 2024-12-07 RX ORDER — MULTIPLE VITAMINS W/ MINERALS TAB 9MG-400MCG
1 TAB ORAL DAILY
Status: DISCONTINUED | OUTPATIENT
Start: 2024-12-08 | End: 2024-12-14 | Stop reason: HOSPADM

## 2024-12-07 RX ORDER — TORSEMIDE 20 MG/1
60 TABLET ORAL DAILY
Status: DISCONTINUED | OUTPATIENT
Start: 2024-12-08 | End: 2024-12-14 | Stop reason: HOSPADM

## 2024-12-07 RX ADMIN — SODIUM CHLORIDE, PRESERVATIVE FREE 90 ML: 5 INJECTION INTRAVENOUS at 15:44

## 2024-12-07 RX ADMIN — POTASSIUM & SODIUM PHOSPHATES POWDER PACK 280-160-250 MG 2 PACKET: 280-160-250 PACK at 21:12

## 2024-12-07 RX ADMIN — MAGNESIUM OXIDE TAB 400 MG (241.3 MG ELEMENTAL MG) 400 MG: 400 (241.3 MG) TAB at 15:58

## 2024-12-07 RX ADMIN — POTASSIUM CHLORIDE 40 MEQ: 20 SOLUTION ORAL at 16:01

## 2024-12-07 RX ADMIN — SODIUM CHLORIDE 500 ML: 9 INJECTION, SOLUTION INTRAVENOUS at 18:22

## 2024-12-07 RX ADMIN — MAGNESIUM SULFATE IN DEXTROSE 1 G: 10 INJECTION, SOLUTION INTRAVENOUS at 17:35

## 2024-12-07 RX ADMIN — METFORMIN HYDROCHLORIDE 500 MG: 500 TABLET, FILM COATED ORAL at 19:35

## 2024-12-07 RX ADMIN — POTASSIUM & SODIUM PHOSPHATES POWDER PACK 280-160-250 MG 2 PACKET: 280-160-250 PACK at 18:19

## 2024-12-07 RX ADMIN — WARFARIN SODIUM 5 MG: 5 TABLET ORAL at 18:20

## 2024-12-07 RX ADMIN — CHLORHEXIDINE GLUCONATE, 0.12% ORAL RINSE 15 ML: 1.2 SOLUTION DENTAL at 19:35

## 2024-12-07 RX ADMIN — SENNOSIDES AND DOCUSATE SODIUM 1 TABLET: 8.6; 5 TABLET ORAL at 21:13

## 2024-12-07 RX ADMIN — IOPAMIDOL 67 ML: 755 INJECTION, SOLUTION INTRAVENOUS at 15:45

## 2024-12-07 RX ADMIN — POTASSIUM CHLORIDE 10 MEQ: 7.46 INJECTION, SOLUTION INTRAVENOUS at 16:02

## 2024-12-07 ASSESSMENT — ACTIVITIES OF DAILY LIVING (ADL)
ADLS_ACUITY_SCORE: 63

## 2024-12-07 NOTE — LETTER
Recipient:    [facility] level 2        Sender:    YAAKOV Garcia   Coverin MS SW  Ph: [phone]  [facility]  Care Management Department          Date: 2024  Patient Name:  Clarke Moreira  Patient YOB: 1950  Routing Message:  NO Psych notes from this admission only H&P        The documents accompanying this notice contain confidential information belonging to the sender.  This information is intended only for the use of the individual or entity named above.  The authorized recipient of this information is prohibited from disclosing this information to any other party and is required to destroy the information after its stated need has been fulfilled, unless otherwise required by state law.    If you are not the intended recipient, you are hereby notified that any disclosure, copy, distribution or action taken in reliance on the contents of these documents is strictly prohibited.  If you have received this document in error, please return it by fax to [phone] with a note on the cover sheet explaining why you are returning it (e.g. not your patient, not your provider, etc.).  If you need further assistance, please call [facility] Centralized Transcription at [phone].  Documents may also be returned by mail to [employer], , [facility] [address], [place], [place] [address].

## 2024-12-07 NOTE — PHARMACY-ANTICOAGULATION SERVICE
Clinical Pharmacy - Warfarin Dosing Consult     Pharmacy has been consulted to manage this patient s warfarin therapy.  Indication: Atrial Fibrillation  Therapy Goal: INR 2-3  Provider/Team: Gold  OP Anticoag Clinic: [facility]  Warfarin Prior to Admission: Yes  Warfarin PTA Regimen: 8 mg daily  Significant drug interactions: Aspirin  Recent documented change in oral intake/nutrition: Unknown    INR   Date Value Ref Range Status   12/07/2024 3.49 (H) 0.85 - 1.15 Final     INR (External)   Date Value Ref Range Status   12/05/2024 2.8  Final       Recommend warfarin 5 mg today given supratherapeutic INR.  Pharmacy will monitor Clarke Moreira daily and order warfarin doses to achieve specified goal.      Please contact pharmacy as soon as possible if the warfarin needs to be held for a procedure or if the warfarin goals change.

## 2024-12-07 NOTE — LETTER
[facility] MED SURG  [address] [facility] [address] [phone]    FACSIMILE TRANSMITTAL SHEET    TO: [facility]  COMPANY: Kal Torres  FAX NUMBER: [phone]       FROM: Suki  PHONE: [phone]  DATE: 12/14/24      _____URGENT _____REVIEW ONLY _____PLEASE COMMENT____PLEASE REPLY    NOTES/COMMENTS: Discharge Orders for CECIL Moreira                                      IF YOU DID NOT RECEIVE THE CORRECT NUMBER OF PAGES OR THE FAX DID NOT COME THROUGH CLEARLY, PLEASE CALL THE SENDER     CONFIDENTIALITY STATEMENT: Confidential information that may accompany this transmission contains protected health information under state and federal law and is legally privileged. This information is intended only for the use of the individual or entity named above and may be used only for carrying out treatment, payment or other healthcare operations. The recipient or person responsible for delivering this information is prohibited by law from disclosing this information without proper authorization to any other party, unless required to do so by law or regulation. If you are not the intended recipient, you are hereby notified that any review, dissemination, distribution, or copying of this message is strictly prohibited. If you have received this communication in error, please destroy the materials and contact us immediately by calling the number listed above. No response indicates that the information was received by the appropriate authorized party

## 2024-12-07 NOTE — ED TRIAGE NOTES
"BIBA from home, fall from a standing position. Recurrent falls, last fall was this October, per patient fall today was because \"his legs gave way\" No new pain, denies neck tenderness. EMS reported hypotensive enroute however   Normotensive in ED.          "

## 2024-12-07 NOTE — ED PROVIDER NOTES
[place] EMERGENCY DEPARTMENT ([facility])    12/07/24       ED PROVIDER NOTE  History     Chief Complaint   Patient presents with    Fall     HPI  Clarke Moreira is a 74 year old male with a past medical history of CHF, chronic A-fib on warfarin, HTN, HLD, PAD, CKD stage 3, DM 2, hypothyroidism, and frequent falls who presents to the emergency department by EMS for evaluation following a fall at home.  Patient is been in and out of hospitals and recently left a TCU placed a few days ago per his report and was doing well at home until today when he had a couple falls.  He uses a walker to get around and states that he felt like he was unsteady on his feet and his legs gave out on him.  He is able to get up 1 time but then the second time he was unable to get up and called EMS to bring him to the emergency department for further evaluation.  He denies any focal weakness or numbness.  He denies hitting his head.  He is medications for atrial fibrillation (warfarin) but did not have any significant head trauma, chest trauma, or abdominal trauma or pelvic trauma in the setting of the fall.  He has no specific acute complaints other than recent diarrhea, generalized weakness and feeling unsteady on his feet.    Past Medical History  Past Medical History:   Diagnosis Date    Atrial fibrillation (H)     Basal cell carcinoma     Chronic anticoagulation     Diastolic heart failure (H)     Dyslipidemia     Essential hypertension     Morbid obesity (H)     Sleep-disordered breathing     Type 2 diabetes mellitus (H)      Past Surgical History:   Procedure Laterality Date    BIOPSY OF SKIN LESION      MOHS MICROGRAPHIC PROCEDURE      PICC INSERTION Right 09/24/2017    5fr TL Bard PICC, 51cm (1cm external) in the R medial brachial vein w/ tip in the SVC.     acetaminophen (TYLENOL) 325 MG tablet  artificial saliva (BIOTENE MT) SOLN solution  aspirin (ASA) 81 MG chewable tablet  atorvastatin (LIPITOR) 40 MG  tablet  CERTAVITE/ANTIOXIDANTS tablet  chlorhexidine (PERIDEX) 0.12 % solution  ferrous sulfate (FEROSUL) 325 (65 Fe) MG tablet  levothyroxine (SYNTHROID/LEVOTHROID) 175 MCG tablet  metFORMIN (GLUCOPHAGE) 500 MG tablet  metoprolol succinate ER (TOPROL XL) 100 MG 24 hr tablet  Omega-3 Fatty Acids (EQL FISH OIL) 1000 MG CAPS  order for DME  order for DME  order for DME  order for DME  potassium chloride chen ER (KLOR-CON M20) 20 MEQ CR tablet  senna-docusate (SENEXON-S) 8.6-50 MG tablet  spironolactone (ALDACTONE) 50 MG tablet  tamsulosin (FLOMAX) 0.4 MG capsule  Torsemide 40 MG TABS  urea (CARMOL) 10 % external lotion  VITAMIN A PO  vitamin B complex with vitamin C (VITAMIN  B COMPLEX) tablet  vitamin C (ASCORBIC ACID) 1000 MG TABS  Vitamin D, Cholecalciferol, 25 MCG (1000 UT) CAPS  vitamin E (VITAMIN E COMPLEX) 1000 units (450 mg) capsule  warfarin ANTICOAGULANT (COUMADIN) 3 MG tablet  warfarin ANTICOAGULANT (COUMADIN) 5 MG tablet      Allergies   Allergen Reactions    Seasonal Allergies      Family History  Family History   Problem Relation Age of Onset    Depression Mother     Glaucoma Maternal Grandfather     Cancer No family hx of         No family history of skin cancer    Macular Degeneration No family hx of      Social History   Social History     Tobacco Use    Smoking status: Never    Smokeless tobacco: Never   Substance Use Topics    Alcohol use: No     Comment: Former alcohol abuse. Quit 70s then quit later in 80s-present    Drug use: No     Comment: history of LSD use      Past medical history, past surgical history, medications, allergies, family history, and social history were reviewed with the patient. No additional pertinent items.   A medically appropriate review of systems was performed with pertinent positives and negatives noted in the HPI, and all other systems negative.    Physical Exam   BP: 104/88  Pulse: 90  Temp: 98.3  F (36.8  C)  Resp: 18  SpO2: 98 %  Physical Exam  Vitals and nursing  note reviewed.   Constitutional:       General: He is not in acute distress.     Appearance: Normal appearance. He is not toxic-appearing.   HENT:      Head: Atraumatic.   Eyes:      General: No scleral icterus.     Conjunctiva/sclera: Conjunctivae normal.   Cardiovascular:      Rate and Rhythm: Normal rate and regular rhythm.      Heart sounds: Normal heart sounds.   Pulmonary:      Effort: Pulmonary effort is normal. No respiratory distress.      Breath sounds: Normal breath sounds.   Abdominal:      Palpations: Abdomen is soft.      Tenderness: There is no abdominal tenderness.   Musculoskeletal:         General: No deformity.      Cervical back: Neck supple.   Skin:     General: Skin is warm.   Neurological:      Mental Status: He is alert and oriented to person, place, and time.      Sensory: No sensory deficit.   Psychiatric:         Mood and Affect: Mood normal.         Behavior: Behavior normal.           ED Course, Procedures, & Data      Procedures                Results for orders placed or performed during the hospital encounter of 12/07/24   Head CT w/o contrast     Status: None (Preliminary result)    Impression    RESIDENT PRELIMINARY INTERPRETATION  Impression:   1. CT head reveals no acute intracranial pathology. Specifically, no  acute intracranial hemorrhage. Chronic areas of  encephalomalacia/gliosis involving the occipital lobes and right  frontal lobe..  2. Head CTA demonstrates no aneurysm or stenosis of the major  intracranial arteries.   3. Neck CTA demonstrates no stenosis of the major cervical arteries.      CTA Head Neck w Contrast     Status: None (Preliminary result)    Impression    RESIDENT PRELIMINARY INTERPRETATION  Impression:   1. CT head reveals no acute intracranial pathology. Specifically, no  acute intracranial hemorrhage. Chronic areas of  encephalomalacia/gliosis involving the occipital lobes and right  frontal lobe..  2. Head CTA demonstrates no aneurysm or stenosis of the  major  intracranial arteries.   3. Neck CTA demonstrates no stenosis of the major cervical arteries.      [place] Draw     Status: None    Narrative    The following orders were created for panel order [place] Draw.  Procedure                               Abnormality         Status                     ---------                               -----------         ------                     Extra Blue Top Tube[[id number]]                              Final result               Extra Red Top Tube[[id number]]                               Final result               Extra Green Top (Lithium...[[id number]]                      Final result               Extra Purple Top Tube[[id number]]                            Final result                 Please view results for these tests on the individual orders.   Extra Blue Top Tube     Status: None   Result Value Ref Range    Hold Specimen JIC    Extra Red Top Tube     Status: None   Result Value Ref Range    Hold Specimen JIC    Extra Green Top (Lithium Heparin) Tube     Status: None   Result Value Ref Range    Hold Specimen JIC    Extra Purple Top Tube     Status: None   Result Value Ref Range    Hold Specimen JIC    Comprehensive metabolic panel     Status: Abnormal   Result Value Ref Range    Sodium 133 (L) 135 - 145 mmol/L    Potassium 2.6 (LL) 3.4 - 5.3 mmol/L    Carbon Dioxide (CO2) 25 22 - 29 mmol/L    Anion Gap 16 (H) 7 - 15 mmol/L    Urea Nitrogen 14.0 8.0 - 23.0 mg/dL    Creatinine 1.21 (H) 0.67 - 1.17 mg/dL    GFR Estimate 63 >60 mL/min/1.73m2    Calcium 9.1 8.8 - 10.4 mg/dL    Chloride 92 (L) 98 - 107 mmol/L    Glucose 152 (H) 70 - 99 mg/dL    Alkaline Phosphatase 103 40 - 150 U/L    AST 22 0 - 45 U/L    ALT 14 0 - 70 U/L    Protein Total 6.7 6.4 - 8.3 g/dL    Albumin 3.6 3.5 - 5.2 g/dL    Bilirubin Total 0.4 <=1.2 mg/dL   Magnesium     Status: Abnormal   Result Value Ref Range    Magnesium 1.6 (L) 1.7 - 2.3 mg/dL   CBC with platelets and differential     Status: Abnormal    Result Value Ref Range    WBC Count 10.7 4.0 - 11.0 10e3/uL    RBC Count 4.91 4.40 - 5.90 10e6/uL    Hemoglobin 11.7 (L) 13.3 - 17.7 g/dL    Hematocrit 38.8 (L) 40.0 - 53.0 %    MCV 79 78 - 100 fL    MCH 23.8 (L) 26.5 - 33.0 pg    MCHC 30.2 (L) 31.5 - 36.5 g/dL    RDW 18.9 (H) 10.0 - 15.0 %    Platelet Count 342 150 - 450 10e3/uL    % Neutrophils 79 %    % Lymphocytes 10 %    % Monocytes 11 %    % Eosinophils 0 %    % Basophils 0 %    % Immature Granulocytes 0 %    NRBCs per 100 WBC 0 <1 /100    Absolute Neutrophils 8.5 (H) 1.6 - 8.3 10e3/uL    Absolute Lymphocytes 1.0 0.8 - 5.3 10e3/uL    Absolute Monocytes 1.1 0.0 - 1.3 10e3/uL    Absolute Eosinophils 0.0 0.0 - 0.7 10e3/uL    Absolute Basophils 0.0 0.0 - 0.2 10e3/uL    Absolute Immature Granulocytes 0.0 <=0.4 10e3/uL    Absolute NRBCs 0.0 10e3/uL   INR     Status: Abnormal   Result Value Ref Range    INR 3.49 (H) 0.85 - 1.15   CBC with Platelets & Differential     Status: Abnormal    Narrative    The following orders were created for panel order CBC with Platelets & Differential.  Procedure                               Abnormality         Status                     ---------                               -----------         ------                     CBC with platelets and d...[[id number]]  Abnormal            Final result                 Please view results for these tests on the individual orders.     Medications   potassium chloride 10 mEq in 100 mL sterile water infusion (10 mEq Intravenous $New Bag 12/7/24 1602)   magnesium oxide (MAG-OX) tablet 400 mg (400 mg Oral $Given 12/7/24 1558)   magnesium sulfate 1 g in 100 mL D5W intermittent infusion (has no administration in time range)   potassium chloride (KAYCIEL) solution 40 mEq (40 mEq Oral $Given 12/7/24 1601)   sodium chloride 0.9 % (90 mLs Intravenous $Given 12/7/24 5194)   iopamidol (ISOVUE-370) solution 100 mL (67 mLs Intravenous $Given 12/7/24 0370)     Labs Ordered and Resulted from Time of ED  Arrival to Time of ED Departure   COMPREHENSIVE METABOLIC PANEL - Abnormal       Result Value    Sodium 133 (*)     Potassium 2.6 (*)     Carbon Dioxide (CO2) 25      Anion Gap 16 (*)     Urea Nitrogen 14.0      Creatinine 1.21 (*)     GFR Estimate 63      Calcium 9.1      Chloride 92 (*)     Glucose 152 (*)     Alkaline Phosphatase 103      AST 22      ALT 14      Protein Total 6.7      Albumin 3.6      Bilirubin Total 0.4     MAGNESIUM - Abnormal    Magnesium 1.6 (*)    CBC WITH PLATELETS AND DIFFERENTIAL - Abnormal    WBC Count 10.7      RBC Count 4.91      Hemoglobin 11.7 (*)     Hematocrit 38.8 (*)     MCV 79      MCH 23.8 (*)     MCHC 30.2 (*)     RDW 18.9 (*)     Platelet Count 342      % Neutrophils 79      % Lymphocytes 10      % Monocytes 11      % Eosinophils 0      % Basophils 0      % Immature Granulocytes 0      NRBCs per 100 WBC 0      Absolute Neutrophils 8.5 (*)     Absolute Lymphocytes 1.0      Absolute Monocytes 1.1      Absolute Eosinophils 0.0      Absolute Basophils 0.0      Absolute Immature Granulocytes 0.0      Absolute NRBCs 0.0     INR - Abnormal    INR 3.49 (*)    ROUTINE UA WITH MICROSCOPIC REFLEX TO CULTURE   PHOSPHORUS     Head CT w/o contrast   Preliminary Result   RESIDENT PRELIMINARY INTERPRETATION   Impression:    1. CT head reveals no acute intracranial pathology. Specifically, no   acute intracranial hemorrhage. Chronic areas of   encephalomalacia/gliosis involving the occipital lobes and right   frontal lobe..   2. Head CTA demonstrates no aneurysm or stenosis of the major   intracranial arteries.    3. Neck CTA demonstrates no stenosis of the major cervical arteries.          CTA Head Neck w Contrast   Preliminary Result   RESIDENT PRELIMINARY INTERPRETATION   Impression:    1. CT head reveals no acute intracranial pathology. Specifically, no   acute intracranial hemorrhage. Chronic areas of   encephalomalacia/gliosis involving the occipital lobes and right   frontal lobe..    2. Head CTA demonstrates no aneurysm or stenosis of the major   intracranial arteries.    3. Neck CTA demonstrates no stenosis of the major cervical arteries.                 Critical care was not performed.     Medical Decision Making  The patient's presentation was of high complexity (a chronic illness severe exacerbation, progression, or side effect of treatment).    The patient's evaluation involved:  review of external note(s) from 1 sources (see separate area of note for details)  review of 3+ test result(s) ordered prior to this encounter (see separate area of note for details)  ordering and/or review of 3+ test(s) in this encounter (see separate area of note for details)    The patient's management necessitated high risk (a decision regarding hospitalization).    Assessment & Plan    Clarke Moreira is a 74 year old male with a past medical history of CHF, chronic A-fib on warfarin, HTN, HLD, PAD, CKD stage 3, DM 2, hypothyroidism, and frequent falls who presents to the emergency department by EMS for evaluation following a fall at home.  Patient is nontoxic-appearing on exam, his vital signs within normal limits.  He is unable to get up and out of bed and demonstrate gait due to feeling generally weak however he does not have focal on exam.  He was worked up with CT head and CTA head and neck to further assess for possible intracranial abnormality or vascular compromise such as vertebral dissection that may be contributing to gait instability.  The studies came back negative.  Lab work obtained as above and patient does have evidence of hypokalemia with a potassium of 2.6 as well as hypomagnesemia with a magnesium of 1.6.  These were supplemented orally and intravenously.  In light of patient's frequent falls, generalized weakness, and electrolyte abnormalities, he is admitted to medicine for further ongoing management and care as well as evaluation by physical therapy.    I have reviewed the nursing  notes. I have reviewed the findings, diagnosis, plan and need for follow up with the patient.    New Prescriptions    No medications on file       Final diagnoses:   Multiple falls   Hypokalemia   Hypomagnesemia       Erick Bazan MD  [facility] EMERGENCY DEPARTMENT  12/7/2024     Erick Bazan MD  12/07/24 9754

## 2024-12-07 NOTE — H&P
[facility]    History and Physical - Hospitalist Service, GOLD TEAM        Date of Admission:  12/7/2024    Assessment & Plan    Clarke Moreira is a 74 year old male admitted on 12/7/2024. He has a past medical history of DMII, CKD, HFpEF, A fib, depression, THONG, chronic anemia, hypothyroidism, HLD, and BPH who was admitted to [facility] 11/7/24 following fall at home and electrolyte derangements. He was admitted to internal medicine for further evaluation.     Of note, recently admitted to [facility] 9/22 to 10/3 with similar presentation and was found to have citrobacter bacteremia. He was then admitted 10/23 with an MICHELLE after fall. Recently discharged from TCU but has since struggled to care for self, perform ADLs.   _______________________     # Mechanical fall  # Bilateral knee and ankle pain with movement  Clarke presented to the ED following multiple falls today at home. He has been in and out of hospitals and recently left a TCU 11/25/24 and was doing well at home until today - noted he was discharged to early but insurance was arguing whether to cover longer stay. He uses a walker to get around and states that he felt like he was unsteady on his feet and his legs gave out on him. Was on the floor x 40 minutes and called EMS to bring him to the emergency department for further evaluation - CK 41. Noted that he not hit his head - CT head was conducted and was negative for acute intracranial pathology. Denied any preceding dizziness, vertigo,chest pain, shortness of breath, palpitations. Did not have any significant head trauma, chest trauma, or abdominal trauma or pelvic trauma in the setting of the fall. He noted his has substantial pain in his knees and ankles with movement from crawling to the phone; also noted that he put pressure onto his right shoulder joint which is tender with movement.   Presented to the ED following   - Negative head CT   - IV  mL bolus  "  - social work consulted   - UA ordered   - PT, OT  - Pain management with tylenol ok per patient   - Fall precautions, up with assist   - XR right shoulder, bilateral knees, and bilateral ankle ordered     # Hypokalemia   # Hyponatremia  # Hypophosphatemia   # Anion gap metabolic acidosis  Anion gap 16, sodium 133, potassium 2.6, mag 1.6, phos 1.8. Unclear etiology. Concern for malnutrition, though, he noted he has been eating a variety of nutritious meals including salads. He queries if his medications are causing electrolyte derangements.    - RD consulted   - RN replacement protocol, potassium and mag replaced in ED   - IV NS ordered   - AM CMP, mag, phos     # HFpEF   # HTN  Echo  10/25:  EF 55-60%.   - Continue PTA  Spironolactone, Torsemide 60 mg daily   - HOLD pta K supplementation for now      # Paroxysmal A fib  # Long term anticoagulation   - CHADSVASC 5. PTA on Coumadin and Metoprolol.   - Coumadin per pharmacy  - Continue metoprolol with hold parameters      # Chronic hypercapnic respiratory failure   # THONG  - Continue CPAP      # CKD III  Baseline Cr around 1. Elevated to 1.21 on 12/7 but not meeting criteria for MICHELLE.    - Small IV fluid bolus given   - UA negative for infection  - avoid nephrotoxic agents      # T2DM with neuropathy   Chronic peripheral neuropathy. A1c 6.2 10/7.  - continue PTA on Metformin only.   - BID BG checks   - Hypoglycemia protocol      # HLD  - Continue PTA ASA and statin     # Chronic microcytic anemia:   Baseline hgb 10.5-12 and stable.   - Continue pta iron      # Hypothyroidism  TSH 0.93 10/7/24.   - Continue synthroid      # Osteopenia on XR  - vit d levels were nl last admission     # BPH   - Continue Flomax     # Peridontal disease  During last admission was seen by dentistry,  on evaluation \"possible root tip maxillary left second molar (#15).   - Continue pta chlorhexidine oral rinse BID for 4-8 weeks     # MDD  # CHRIS  - not on medications PTA, see above     # " "Chronic, intermittent constipation   - PRN senna and miralax             Diet:  Reg adult diet   DVT Prophylaxis: Pneumatic Compression Devices  Bazzi Catheter: Not present  Lines: None     Cardiac Monitoring: None  Code Status:  Full code     Clinically Significant Risk Factors Present on Admission        # Hypokalemia: Lowest K = 2.6 mmol/L in last 2 days, will replace as needed  # Hyponatremia: Lowest Na = 133 mmol/L in last 2 days, will monitor as appropriate  # Hypochloremia: Lowest Cl = 92 mmol/L in last 2 days, will monitor as appropriate    # Hypomagnesemia: Lowest Mg = 1.6 mg/dL in last 2 days, will replace as needed    # Drug Induced Coagulation Defect: home medication list includes an anticoagulant medication  # Drug Induced Platelet Defect: home medication list includes an antiplatelet medication   # Hypertension: Noted on problem list  # Chronic heart failure with preserved ejection fraction: heart failure noted on problem list and last echo with EF >50%          # Severe Obesity: Estimated body mass index is 48.03 kg/m  as calculated from the following:    Height as of 11/26/24: 1.803 m (5' 11\").    Weight as of 11/26/24: 156.2 kg (344 lb 6.4 oz).       # Financial/Environmental Concerns:           Disposition Plan     Medically Ready for Discharge: Anticipated in 2-4 Days         The patient's care was discussed with the Attending Physician, Dr. Nichols, Bedside Nurse, and Patient.    Cr Escobedo PA-C  Hospitalist Service, [facility]  Securely message with [employer] (more info)  Text page via [facility] Paging/Directory   See signed in provider for up to date coverage information    ______________________________________________________________________    Chief Complaint   Fall     History is obtained from the patient    History of Present Illness   Clarke Moreira is a 74 year old male with a past medical history of CHF, chronic A-fib on warfarin, " HTN, HLD, PAD, CKD stage 3, DM 2, hypothyroidism, and frequent falls who presents to the emergency department by EMS for evaluation following a fall at home.  Patient is been in and out of hospitals and recently left a TCU placed a few days ago per his report and was doing well at home until today when he had a couple falls.      He uses a walker to get around and states that he felt like he was unsteady on his feet and his legs gave out on him while he was in the kitchen. He fell back and his bottom hit the stove and he fell on his right hip. He used his right shoulder to brace. He crawled to the phone and noted that he really hurt his knees. He also states he tried to stand and feels that his ankles are quite sore. He called EMS and they brought him to ED.     He denies any focal weakness or numbness.  He denies hitting his head. Denies preceding symptoms of chest pain, palpitations, lightheadedness, dizziness.  He is medications for atrial fibrillation (warfarin) but did not have any significant head trauma, chest trauma, or abdominal trauma or pelvic trauma in the setting of the fall.  He has no specific acute complaints other than recent diarrhea, generalized weakness and feeling unsteady on his feet. Feels he left TCU too early and now is struggling.           Past Medical History    Past Medical History:   Diagnosis Date    Atrial fibrillation (H)     Basal cell carcinoma     Chronic anticoagulation     Diastolic heart failure (H)     Dyslipidemia     Essential hypertension     Morbid obesity (H)     Sleep-disordered breathing     Type 2 diabetes mellitus (H)        Past Surgical History   Past Surgical History:   Procedure Laterality Date    BIOPSY OF SKIN LESION      MOHS MICROGRAPHIC PROCEDURE      PICC INSERTION Right 09/24/2017    5fr TL Bard PICC, 51cm (1cm external) in the R medial brachial vein w/ tip in the SVC.       Prior to Admission Medications   Prior to Admission Medications   Prescriptions  Last Dose Informant Patient Reported? Taking?   CERTAVITE/ANTIOXIDANTS tablet   No No   Sig: TAKE ONE TABLET BY MOUTH ONE TIME DAILY   Omega-3 Fatty Acids (EQL FISH OIL) 1000 MG CAPS   No No   Sig: Take 1 capsule by mouth daily   Torsemide 40 MG TABS   No No   Sig: Take 80 mg by mouth daily.   VITAMIN A PO   Yes No   Sig: Take 1 tablet by mouth daily.   Vitamin D, Cholecalciferol, 25 MCG (1000 UT) CAPS   Yes No   Sig: Take 1 capsule by mouth daily.   acetaminophen (TYLENOL) 325 MG tablet   No No   Sig: Take 2 tablets (650 mg) by mouth every 4 hours as needed for mild pain or other (and adjunct with moderate or severe pain or per patient request).   artificial saliva (BIOTENE MT) SOLN solution   No No   Sig: Take 1 spray by mouth 4 times daily as needed for dry mouth.   aspirin (ASA) 81 MG chewable tablet   No No   Sig: Take 1 tablet (81 mg) by mouth daily.   atorvastatin (LIPITOR) 40 MG tablet   No No   Sig: Take 1 tablet (40 mg) by mouth daily   chlorhexidine (PERIDEX) 0.12 % solution   No No   Sig: Swish and spit 15 mLs in mouth 2 times daily.   ferrous sulfate (FEROSUL) 325 (65 Fe) MG tablet   No No   Sig: Take 1 tablet (325 mg) by mouth every other day. For iron deficiency anemia   levothyroxine (SYNTHROID/LEVOTHROID) 175 MCG tablet   No No   Sig: Take 1 tablet (175 mcg) by mouth every morning (before breakfast)   metFORMIN (GLUCOPHAGE) 500 MG tablet   No No   Sig: Take 1 tablet (500 mg) by mouth 2 times daily (with meals).   metoprolol succinate ER (TOPROL XL) 100 MG 24 hr tablet   No No   Sig: Take 1 tablet (100 mg) by mouth daily.   order for DME   No No   Sig: Equipment being ordered: Other: Velcro Compression stockings.   Treatment Diagnosis: bilateral lymphedema.   order for DME   No No   Sig: Equipment being ordered: Bariatric Lift chair  Diagnosis - morbid obesity, CHF, Lymphedema    Fax to Ne from [email or web address] at [phone].   order for DME   No No   Sig: Equipment being ordered: Custom diabetic  shoes   order for DME   No No   Sig: Equipment being ordered: Diabetes Shoes   potassium chloride chen ER (KLOR-CON M20) 20 MEQ CR tablet   Yes No   Sig: Take 2 tablets (40 mEq) by mouth 2 times daily.   senna-docusate (SENEXON-S) 8.6-50 MG tablet   No No   Sig: Take 1 to 2 tablets by mouth 2 times daily as needed for constipation   spironolactone (ALDACTONE) 50 MG tablet   No No   Sig: Take 1 tablet (50 mg) by mouth daily.   tamsulosin (FLOMAX) 0.4 MG capsule   No No   Sig: Take 1 capsule (0.4 mg) by mouth daily   urea (CARMOL) 10 % external lotion   No No   Sig: Apply topically 2 times daily as needed for dry skin.   vitamin B complex with vitamin C (VITAMIN  B COMPLEX) tablet   No No   Sig: Take 1 tablet by mouth daily   vitamin C (ASCORBIC ACID) 1000 MG TABS   Yes No   Sig: Take 1,000 mg by mouth daily   vitamin E (VITAMIN E COMPLEX) 1000 units (450 mg) capsule   No No   Sig: Take 1 capsule (1,000 Units) by mouth daily   warfarin ANTICOAGULANT (COUMADIN) 3 MG tablet   No No   Sig: Take by mouth 8 mg (5 mg x 1 tablet and 3 mg x 1 tablet) every day OR as directed by INR clinic   warfarin ANTICOAGULANT (COUMADIN) 5 MG tablet   No No   Sig: Take by mouth 8 mg (5 mg x 1 tablet and 3 mg x 1 tablet) every day OR as directed by INR clinic      Facility-Administered Medications: None           Physical Exam   Vital Signs: Temp: 98.3  F (36.8  C) Temp src: Oral BP: 104/88 Pulse: 90   Resp: 18 SpO2: 98 % O2 Device: None (Room air)    Weight: 0 lbs 0 oz    Constitutional:       General: He is not in acute distress.     Appearance: Normal appearance. He is not toxic-appearing.   HENT:      Head: Atraumatic.   Eyes:      General: No scleral icterus.     Conjunctiva/sclera: Conjunctivae normal.   Cardiovascular:      Rate and Rhythm: Normal rate and regular rhythm.      Heart sounds: Normal heart sounds.   Pulmonary:      Effort: Pulmonary effort is normal. No respiratory distress.      Breath sounds: Normal breath sounds.    Abdominal:      Palpations: Abdomen is soft.      Tenderness: There is no abdominal tenderness.   Musculoskeletal:         General: No deformity.      Cervical back: Neck supple.   Bilat Ankles: full ROM but tenderness. No obvious deformity. Full strength. Baseline sensation. Post. Tib pulses palpable.   Bilat knees: tenderness to palpation of knee. ROM limited by pain. No effusion noted grossly. No deformity. No redness or swelling visualized.   Left shoulder. Full ROM, tenderness to palpation of scapula   Skin:     General: Skin is warm.   Neurological:      Mental Status: He is alert and oriented to person, place, and time.      Sensory: No sensory deficit.   Psychiatric:         Mood and Affect: Mood normal.         Behavior: Behavior normal.     Medical Decision Making       90 MINUTES SPENT BY ME on the date of service doing chart review, history, exam, documentation & further activities per the note.      Data   Recent Labs   Lab 12/07/24  1850 12/07/24  1419 12/05/24  0000   WBC  --  10.7  --    HGB  --  11.7*  --    MCV  --  79  --    PLT  --  342  --    INR 3.45* 3.49* 2.8   NA  --  133*  --    POTASSIUM  --  2.6*  --    CHLORIDE  --  92*  --    CO2  --  25  --    BUN  --  14.0  --    CR  --  1.21*  --    ANIONGAP  --  16*  --    CHERI  --  9.1  --    GLC  --  152*  --    ALBUMIN  --  3.6  --    PROTTOTAL  --  6.7  --    BILITOTAL  --  0.4  --    ALKPHOS  --  103  --    ALT  --  14  --    AST  --  22  --

## 2024-12-08 LAB
ALBUMIN SERPL BCG-MCNC: 3.1 G/DL (ref 3.5–5.2)
ALP SERPL-CCNC: 93 U/L (ref 40–150)
ALT SERPL W P-5'-P-CCNC: 13 U/L (ref 0–70)
ANION GAP SERPL CALCULATED.3IONS-SCNC: 12 MMOL/L (ref 7–15)
AST SERPL W P-5'-P-CCNC: 20 U/L (ref 0–45)
ATRIAL RATE - MUSE: NORMAL BPM
BILIRUB SERPL-MCNC: 0.4 MG/DL
BUN SERPL-MCNC: 10.1 MG/DL (ref 8–23)
CALCIUM SERPL-MCNC: 8.9 MG/DL (ref 8.8–10.4)
CHLORIDE SERPL-SCNC: 97 MMOL/L (ref 98–107)
CREAT SERPL-MCNC: 0.9 MG/DL (ref 0.67–1.17)
DIASTOLIC BLOOD PRESSURE - MUSE: NORMAL MMHG
EGFRCR SERPLBLD CKD-EPI 2021: 90 ML/MIN/1.73M2
ERYTHROCYTE [DISTWIDTH] IN BLOOD BY AUTOMATED COUNT: 18.8 % (ref 10–15)
GLUCOSE BLDC GLUCOMTR-MCNC: 111 MG/DL (ref 70–99)
GLUCOSE BLDC GLUCOMTR-MCNC: 130 MG/DL (ref 70–99)
GLUCOSE SERPL-MCNC: 102 MG/DL (ref 70–99)
HCO3 SERPL-SCNC: 27 MMOL/L (ref 22–29)
HCT VFR BLD AUTO: 35.6 % (ref 40–53)
HGB BLD-MCNC: 11.3 G/DL (ref 13.3–17.7)
INR PPP: 3.36 (ref 0.85–1.15)
INTERPRETATION ECG - MUSE: NORMAL
MAGNESIUM SERPL-MCNC: 2 MG/DL (ref 1.7–2.3)
MCH RBC QN AUTO: 25 PG (ref 26.5–33)
MCHC RBC AUTO-ENTMCNC: 31.7 G/DL (ref 31.5–36.5)
MCV RBC AUTO: 79 FL (ref 78–100)
P AXIS - MUSE: NORMAL DEGREES
PHOSPHATE SERPL-MCNC: 3.9 MG/DL (ref 2.5–4.5)
PLATELET # BLD AUTO: 284 10E3/UL (ref 150–450)
POTASSIUM SERPL-SCNC: 2.7 MMOL/L (ref 3.4–5.3)
POTASSIUM SERPL-SCNC: 3.4 MMOL/L (ref 3.4–5.3)
POTASSIUM SERPL-SCNC: 3.8 MMOL/L (ref 3.4–5.3)
PR INTERVAL - MUSE: NORMAL MS
PROT SERPL-MCNC: 6.1 G/DL (ref 6.4–8.3)
QRS DURATION - MUSE: 98 MS
QT - MUSE: 420 MS
QTC - MUSE: 490 MS
R AXIS - MUSE: 25 DEGREES
RBC # BLD AUTO: 4.52 10E6/UL (ref 4.4–5.9)
SODIUM SERPL-SCNC: 136 MMOL/L (ref 135–145)
SYSTOLIC BLOOD PRESSURE - MUSE: NORMAL MMHG
T AXIS - MUSE: 48 DEGREES
VENTRICULAR RATE- MUSE: 82 BPM
WBC # BLD AUTO: 7.5 10E3/UL (ref 4–11)

## 2024-12-08 PROCEDURE — 99232 SBSQ HOSP IP/OBS MODERATE 35: CPT | Performed by: INTERNAL MEDICINE

## 2024-12-08 PROCEDURE — 84100 ASSAY OF PHOSPHORUS: CPT

## 2024-12-08 PROCEDURE — 250N000013 HC RX MED GY IP 250 OP 250 PS 637: Performed by: INTERNAL MEDICINE

## 2024-12-08 PROCEDURE — 83735 ASSAY OF MAGNESIUM: CPT

## 2024-12-08 PROCEDURE — 85018 HEMOGLOBIN: CPT

## 2024-12-08 PROCEDURE — 250N000013 HC RX MED GY IP 250 OP 250 PS 637

## 2024-12-08 PROCEDURE — 36415 COLL VENOUS BLD VENIPUNCTURE: CPT | Performed by: INTERNAL MEDICINE

## 2024-12-08 PROCEDURE — 120N000002 HC R&B MED SURG/OB UMMC

## 2024-12-08 PROCEDURE — 84132 ASSAY OF SERUM POTASSIUM: CPT | Performed by: INTERNAL MEDICINE

## 2024-12-08 PROCEDURE — 36415 COLL VENOUS BLD VENIPUNCTURE: CPT

## 2024-12-08 PROCEDURE — 85610 PROTHROMBIN TIME: CPT

## 2024-12-08 PROCEDURE — 80053 COMPREHEN METABOLIC PANEL: CPT

## 2024-12-08 PROCEDURE — 250N000013 HC RX MED GY IP 250 OP 250 PS 637: Performed by: EMERGENCY MEDICINE

## 2024-12-08 RX ORDER — WARFARIN SODIUM 3 MG/1
3 TABLET ORAL
Status: COMPLETED | OUTPATIENT
Start: 2024-12-08 | End: 2024-12-08

## 2024-12-08 RX ORDER — POTASSIUM CHLORIDE 1500 MG/1
40 TABLET, EXTENDED RELEASE ORAL ONCE
Status: COMPLETED | OUTPATIENT
Start: 2024-12-08 | End: 2024-12-08

## 2024-12-08 RX ORDER — POTASSIUM CHLORIDE 1500 MG/1
40 TABLET, EXTENDED RELEASE ORAL 2 TIMES DAILY
Status: DISCONTINUED | OUTPATIENT
Start: 2024-12-08 | End: 2024-12-14 | Stop reason: HOSPADM

## 2024-12-08 RX ORDER — TORSEMIDE 20 MG/1
60 TABLET ORAL DAILY
COMMUNITY

## 2024-12-08 RX ORDER — POTASSIUM CHLORIDE 1500 MG/1
20 TABLET, EXTENDED RELEASE ORAL ONCE
Status: COMPLETED | OUTPATIENT
Start: 2024-12-08 | End: 2024-12-08

## 2024-12-08 RX ADMIN — ASPIRIN 81 MG CHEWABLE TABLET 81 MG: 81 TABLET CHEWABLE at 08:00

## 2024-12-08 RX ADMIN — SPIRONOLACTONE 50 MG: 25 TABLET ORAL at 08:01

## 2024-12-08 RX ADMIN — OXYCODONE HYDROCHLORIDE AND ACETAMINOPHEN 1000 MG: 500 TABLET ORAL at 09:13

## 2024-12-08 RX ADMIN — POTASSIUM CHLORIDE 20 MEQ: 1500 TABLET, EXTENDED RELEASE ORAL at 11:18

## 2024-12-08 RX ADMIN — Medication 1 TABLET: at 09:13

## 2024-12-08 RX ADMIN — CHLORHEXIDINE GLUCONATE, 0.12% ORAL RINSE 15 ML: 1.2 SOLUTION DENTAL at 20:36

## 2024-12-08 RX ADMIN — WARFARIN SODIUM 3 MG: 3 TABLET ORAL at 17:37

## 2024-12-08 RX ADMIN — FERROUS SULFATE TAB 325 MG (65 MG ELEMENTAL FE) 325 MG: 325 (65 FE) TAB at 09:13

## 2024-12-08 RX ADMIN — POTASSIUM & SODIUM PHOSPHATES POWDER PACK 280-160-250 MG 2 PACKET: 280-160-250 PACK at 02:56

## 2024-12-08 RX ADMIN — B-COMPLEX W/ C & FOLIC ACID TAB 1 TABLET: TAB at 09:13

## 2024-12-08 RX ADMIN — Medication 25 MCG: at 09:13

## 2024-12-08 RX ADMIN — METOPROLOL SUCCINATE 100 MG: 50 TABLET, EXTENDED RELEASE ORAL at 08:01

## 2024-12-08 RX ADMIN — ATORVASTATIN CALCIUM 40 MG: 40 TABLET, FILM COATED ORAL at 08:01

## 2024-12-08 RX ADMIN — TAMSULOSIN HYDROCHLORIDE 0.4 MG: 0.4 CAPSULE ORAL at 17:37

## 2024-12-08 RX ADMIN — MAGNESIUM OXIDE TAB 400 MG (241.3 MG ELEMENTAL MG) 400 MG: 400 (241.3 MG) TAB at 09:13

## 2024-12-08 RX ADMIN — CHLORHEXIDINE GLUCONATE, 0.12% ORAL RINSE 15 ML: 1.2 SOLUTION DENTAL at 08:00

## 2024-12-08 RX ADMIN — POTASSIUM CHLORIDE 40 MEQ: 1500 TABLET, EXTENDED RELEASE ORAL at 09:13

## 2024-12-08 RX ADMIN — POTASSIUM CHLORIDE 40 MEQ: 1500 TABLET, EXTENDED RELEASE ORAL at 20:37

## 2024-12-08 RX ADMIN — METFORMIN HYDROCHLORIDE 500 MG: 500 TABLET, FILM COATED ORAL at 08:01

## 2024-12-08 RX ADMIN — POTASSIUM CHLORIDE 40 MEQ: 1500 TABLET, EXTENDED RELEASE ORAL at 17:37

## 2024-12-08 RX ADMIN — LEVOTHYROXINE SODIUM 175 MCG: 100 TABLET ORAL at 08:00

## 2024-12-08 RX ADMIN — METFORMIN HYDROCHLORIDE 500 MG: 500 TABLET, FILM COATED ORAL at 17:37

## 2024-12-08 RX ADMIN — TORSEMIDE 60 MG: 20 TABLET ORAL at 08:00

## 2024-12-08 RX ADMIN — SENNOSIDES AND DOCUSATE SODIUM 1 TABLET: 8.6; 5 TABLET ORAL at 21:54

## 2024-12-08 ASSESSMENT — ACTIVITIES OF DAILY LIVING (ADL)
ADLS_ACUITY_SCORE: 64
ADLS_ACUITY_SCORE: 60
ADLS_ACUITY_SCORE: 62
ADLS_ACUITY_SCORE: 60
ADLS_ACUITY_SCORE: 60
ADLS_ACUITY_SCORE: 62
ADLS_ACUITY_SCORE: 60
ADLS_ACUITY_SCORE: 63
ADLS_ACUITY_SCORE: 62
ADLS_ACUITY_SCORE: 62
ADLS_ACUITY_SCORE: 60
ADLS_ACUITY_SCORE: 64
ADLS_ACUITY_SCORE: 60
ADLS_ACUITY_SCORE: 63
ADLS_ACUITY_SCORE: 62
ADLS_ACUITY_SCORE: 60
ADLS_ACUITY_SCORE: 62
ADLS_ACUITY_SCORE: 64
ADLS_ACUITY_SCORE: 60
ADLS_ACUITY_SCORE: 63

## 2024-12-08 NOTE — PHARMACY-ADMISSION MEDICATION HISTORY
Pharmacist Admission Medication History    Admission medication history is complete. The information provided in this note is only as accurate as the sources available at the time of the update.    Information Source(s): Hospital records, Facility (TCU/NH/) medication list/MAR, CareEverywhere/SureScripts, and 11/26/24 TCU Discharge Summary      Pertinent Information:   Patient recently discharged from TCU last week; medication information provided mostly from that discharge summary as well as outpatient anticoagulation and medicine notes and recent hospitalizations in Oct/Nov     Changes made to PTA medication list:  Added: None  Deleted: duplicates  Changed: torsemide 80 mg > 60 mg     Allergies reviewed with patient and updates made in EHR: no    Medication History Completed By: JC [facility] 12/8/2024 11:44 AM    Prior to Admission medications    Medication Sig Last Dose Taking? Auth Provider Long Term End Date   torsemide (DEMADEX) 20 MG tablet Take 60 mg by mouth daily. Unknown Yes Unknown, Entered By History No    acetaminophen (TYLENOL) 325 MG tablet Take 2 tablets (650 mg) by mouth every 4 hours as needed for mild pain or other (and adjunct with moderate or severe pain or per patient request). Unknown  Damien Hudson MD     aspirin (ASA) 81 MG chewable tablet Take 1 tablet (81 mg) by mouth daily. Unknown  Henrry Shepard MD     atorvastatin (LIPITOR) 40 MG tablet Take 1 tablet (40 mg) by mouth daily Unknown  Henrry Shepard MD Yes    CERTAVITE/ANTIOXIDANTS tablet TAKE ONE TABLET BY MOUTH ONE TIME DAILY Unknown  Henrry Shepard MD     ferrous sulfate (FEROSUL) 325 (65 Fe) MG tablet Take 1 tablet (325 mg) by mouth every other day. For iron deficiency anemia Unknown  Henrry Shepard MD     levothyroxine (SYNTHROID/LEVOTHROID) 175 MCG tablet Take 1 tablet (175 mcg) by mouth every morning (before breakfast) Unknown  Henrry Shepard MD Yes    metFORMIN (GLUCOPHAGE) 500 MG tablet Take 1 tablet (500  mg) by mouth 2 times daily (with meals). Unknown  Henrry Shepard MD Yes    metoprolol succinate ER (TOPROL XL) 100 MG 24 hr tablet Take 1 tablet (100 mg) by mouth daily. Unknown  Damien Hudson MD Yes    Omega-3 Fatty Acids (EQL FISH OIL) 1000 MG CAPS Take 1 capsule by mouth daily   Henrry Shepard MD     order for DME Equipment being ordered: Diabetes Matthias Leroy MD     order for DME Equipment being ordered: Custom diabetic Matthias Leroy MD     order for DME Equipment being ordered: Bariatric Lift chair  Diagnosis - morbid obesity, CHF, Lymphedema    Fax to Ne from [employer] at [phone].   Matthias Sanchez MD     order for DME Equipment being ordered: Other: Velcro Compression stockings.   Treatment Diagnosis: bilateral lymphedema.   Oswald Mcneal MD     potassium chloride chen ER (KLOR-CON M20) 20 MEQ CR tablet Take 2 tablets (40 mEq) by mouth 2 times daily. Unknown  Oma Ram APRN CNP     senna-docusate (SENEXON-S) 8.6-50 MG tablet Take 1 to 2 tablets by mouth 2 times daily as needed for constipation Unknown  Henrry Shepard MD     spironolactone (ALDACTONE) 50 MG tablet Take 1 tablet (50 mg) by mouth daily. Unknown  Damien Hudson MD Yes    tamsulosin (FLOMAX) 0.4 MG capsule Take 1 capsule (0.4 mg) by mouth daily Unknown  Henrry Shepard MD     urea (CARMOL) 10 % external lotion Apply topically 2 times daily as needed for dry skin.   Damien Hudson MD     VITAMIN A PO Take 1 tablet by mouth daily. Unknown  Reported, Patient     vitamin B complex with vitamin C (VITAMIN  B COMPLEX) tablet Take 1 tablet by mouth daily Unknown  Matthias Sanchez MD     vitamin C (ASCORBIC ACID) 1000 MG TABS Take 1,000 mg by mouth daily Unknown  Reported, Patient     Vitamin D, Cholecalciferol, 25 MCG (1000 UT) CAPS Take 1 capsule by mouth daily. Unknown  Unknown, Entered By History     vitamin E (VITAMIN E COMPLEX) 1000 units (450 mg) capsule Take 1  capsule (1,000 Units) by mouth daily Unknown  Matthias Sanchez MD     warfarin ANTICOAGULANT (COUMADIN) 3 MG tablet Take by mouth 8 mg (5 mg x 1 tablet and 3 mg x 1 tablet) every day OR as directed by INR clinic   Henrry Shepard MD     warfarin ANTICOAGULANT (COUMADIN) 5 MG tablet Take by mouth 8 mg (5 mg x 1 tablet and 3 mg x 1 tablet) every day OR as directed by INR clinic   Henrry Shepard MD

## 2024-12-08 NOTE — PROGRESS NOTES
Pt arrived from [place], I reviewed the note, VS and labs from the last 48hrs, also reviewed orders, no changes needed.    Prince Sosa MD

## 2024-12-08 NOTE — PROGRESS NOTES
6MS ADMISSION    D: Patient admitted/transferred from [place] via stretcher for Hypokalemia/recent fall.     I: Upon arrival to the unit patient was oriented to room, unit, and call light. Patient s height, weight, and vital signs were obtained. Allergies reviewed and allergy band applied. Provider notified of patient s arrival on the unit. Adult AVS completed. Head to toe assessment completed. Education assessment completed. Care plan initiated.    A: Vital signs stable upon admission. Patient rates pain at 5/10. Two RN skin assessment completed yes. Second RN was Janice LICONA Significant Skin Findings include redness under fold on right side of chest, scrape, and scattered bruises.     P: Continue to monitor patient s Electrolyte levels and intervene as needed. Continue with plan of care. Notify provider with any concerns or changes in patient status.        Shift: 2300 - 0730     VS: Blood pressure 127/64, pulse 89, temperature 98.5  F (36.9  C), temperature source Oral, resp. rate 18, height 1.829 m (6'), weight (!) 156.2 kg (344 lb 6.4 oz), SpO2 97%.     Pain:  Pt rates their pain a 5/10.  Neuro: Alert and oriented x4.   Resp: WDL  Diet: Regular diet  Skin: Discoloration maroon/purple BLE, scab/scrape on left knee.   LDA: L-PIV  Activity: Ax2/3 with walker and gait belt   Output: Uses urinal at bedside, continent of bowel   Additional Info/shift updates:   Call light within reach     Plan of care ongoing

## 2024-12-08 NOTE — PLAN OF CARE
Problem: Adult Inpatient Plan of Care  Goal: Absence of Hospital-Acquired Illness or Injury  Intervention: Prevent Infection  Recent Flowsheet Documentation  Taken 12/8/2024 0801 by Dre Alexandra RN  Infection Prevention:   equipment surfaces disinfected   hand hygiene promoted     Problem: Electrolyte Imbalance  Goal: Electrolyte Balance  Outcome: Not Progressing      VS:  Temp: 98.6  F (37  C) Temp src: Oral BP: 115/61 Pulse: 94   Resp: 17 SpO2: 97 % O2 Device: None (Room air)       O2: SpO2 > 97 and stable on RA. Diminished LS    Output: Voids spontaneously without difficulty to urinal   Last BM: 12/05/2024, denies abdominal discomfort. BS active / passing flatus.    Activity: SBA / Ax3, walker and gait belt   Skin:  tenderness on bilateral knees and left shoulder   Scattered scabs, scattered bruises   Scattered bruises on bilaeral lower ex  R pit redness  Bilateral knees tenderness  L shoulder tenderness    CMS: Intact, AOx4.   rude   Dressing:  None    Diet: Combination Regular diet. Denies nausea/vomiting.    Pre breakfast 130   LDA: L PIV SL    Equipment: IV pole, personal belongings,    Plan: standard precautions maintained / Continue with plan of care. Call light within reach, pt able to make needs known.  Plan: therapy, electrolytes     Additional Info:  On RN managed electrolytes      0840 informed Dr FINLEY of the potassium results     1103 spoke with Anneliese, will  the meds tomorrow

## 2024-12-08 NOTE — PROGRESS NOTES
[facility]    Medicine Progress Note - Hospitalist Service, GOLD TEAM 19    Date of Admission:  12/7/2024    Assessment & Plan   A: Patient is a 75 y/o man who has a past medical history significant for diabetes mellitus type II, chronic kidney disease, heart failure with preserved ejection fraction, atrial fibrillation, depression, obstructive sleep apnea, chronic anemia, hypothyroidism, hyperlipidemia and BPH. Patient had been discharged to home from TCU on 25-Nov-2024 and had been doing well until 07-Dec-2024. Patient had expressed concern that he ws discharged too early but insurance reportedly was arguing whether to cover a longer stay Patient uses a walker to ambulate at baseline. Patient noted being unsteady on his feet and that his legs gave out on him. This led to EMS being called and patient being brought to the hospital. CT head was unremarkable.    Patient was found to have hypokalemia.    P:  1.) Mechanical fall:  - Monitoring for safety.  - PT/OT to see.    2.) Bilateral knee and ankle pain with movement:  - Knee and ankle x-rays were negative for fracture.  - Patient on acetaminophen as needed.    3.) Hypokalemia:  - Supplementing.    4.) Hyponatremia, resolved for now:  - Monitoring as needed.    5.) Hypophosphatemia, resolved:  - Supplementing as needed.    6.) Chronic heart failure with preserved ejection fraction:  - On 25-Oct-2024, echocardiogram showed LVEF 55-60%.  - Patient on torsemide and spironolactone.  - Monitoring for evidence of acute heart failure.    7.) Paroxysmal atrial fibrillation:  - [id number] score was 5.  - Patient on coumadin for anticoagulation. INR supratherapeutic today.   - Patient on metoprolol for rate control.    8.) Chronic hypercapnic respiratory failure; obstructive sleep apnea:  - Patient to continue CPAP.    9.) Chronic kidney disease, reportedly stage III:  - Monitoring labs as needed.    10.) Diabetes mellitus type  "II with neuropathy:  - On 07-Oct-2024, HbA1c was 6.2%.  - Patient usually on metformin and this is being continued.  - Monitoring blood glucose twice daily.     11.) Hyperlipidemia:  - Patient on lipitor.    12.) Chronic microcytic anemia:  - Patient on iron supplementation.    13.) Hypothyroidism:  - On 07-Oct-2024, TSH was 0.93.  - Patient on levothyroxine 175 mcg daily.    14.) Possible osteopenia on x-ray:  - Further evaluation as outpatient.    15.) BPH:  - Patient on flomax.    16.) Periodontal disease:  - During previous hospital stay, patient had been seen by dentistry - on evaluation, \"possible root tip maxillary left second molar (#15)\"  - Patient on chlorhexidine oral rinse twice daily.  - Patient should f/u with dentistry as outpatient.    17.) Major depressive disorder; generalized anxiety disorder:  - Patient not currently on medications for these.  - To f/u as outpatient.    18.) Chronic intermittent constipation:  - Bowel regimen.          Diet: Combination Diet Regular Diet Adult    Bazzi Catheter: Not present  Lines: None     Cardiac Monitoring: ACTIVE order. Indication: Electrolyte Imbalance (24 hours)- Magnesium <1.3 mg/ml; Potassium < =2.8 or > 5.5 mg/ml  Code Status: No CPR- Do NOT Intubate      Clinically Significant Risk Factors Present on Admission        # Hypokalemia: Lowest K = 2.6 mmol/L in last 2 days, will replace as needed  # Hyponatremia: Lowest Na = 133 mmol/L in last 2 days, will monitor as appropriate  # Hypochloremia: Lowest Cl = 92 mmol/L in last 2 days, will monitor as appropriate    # Hypomagnesemia: Lowest Mg = 1.6 mg/dL in last 2 days, will replace as needed   # Hypoalbuminemia: Lowest albumin = 3.1 g/dL at 12/8/2024  6:52 AM, will monitor as appropriate  # Drug Induced Coagulation Defect: home medication list includes an anticoagulant medication  # Drug Induced Platelet Defect: home medication list includes an antiplatelet medication   # Hypertension: Noted on problem " list  # Chronic heart failure with preserved ejection fraction: heart failure noted on problem list and last echo with EF >50%          # Severe Obesity: Estimated body mass index is 46.71 kg/m  as calculated from the following:    Height as of this encounter: 1.829 m (6').    Weight as of this encounter: 156.2 kg (344 lb 6.4 oz).       # Financial/Environmental Concerns:           Social Drivers of Health    Housing Stability: Low Risk  (12/8/2024)    Housing Stability     Do you have housing? : Yes     Are you worried about losing your housing?: No   Recent Concern: Housing Stability - High Risk (9/25/2024)    Housing Stability     Do you have housing? : No     Are you worried about losing your housing?: No        Socrates White MD  Hospitalist Service, GOLD TEAM 19  [facility]  Securely message with [employer] (more info)  Text page via [facility] Paging/Directory   See signed in provider for up to date coverage information  ______________________________________________________________________    Interval History   Patient indicated no new pain and no new problems.    Physical Exam   Vital Signs: Temp: 98.6  F (37  C) Temp src: Oral BP: 115/61 Pulse: 94   Resp: 17 SpO2: 97 % O2 Device: None (Room air)    Weight: 344 lbs 6.4 oz    Labs noted.  Sodium 136; Potassium 2.7; Creatinine 0.90;  WBC 7.5; Hb 11.3; Platelets 284;    Medical Decision Making

## 2024-12-09 ENCOUNTER — APPOINTMENT (OUTPATIENT)
Dept: PHYSICAL THERAPY | Facility: CLINIC | Age: 74
DRG: 312 | End: 2024-12-09
Payer: COMMERCIAL

## 2024-12-09 ENCOUNTER — VIRTUAL VISIT (OUTPATIENT)
Dept: PSYCHOLOGY | Facility: CLINIC | Age: 74
End: 2024-12-09
Payer: COMMERCIAL

## 2024-12-09 ENCOUNTER — TELEPHONE (OUTPATIENT)
Dept: FAMILY MEDICINE | Facility: CLINIC | Age: 74
End: 2024-12-09

## 2024-12-09 DIAGNOSIS — F34.1 PERSISTENT DEPRESSIVE DISORDER: Primary | ICD-10-CM

## 2024-12-09 DIAGNOSIS — F33.1 MODERATE EPISODE OF RECURRENT MAJOR DEPRESSIVE DISORDER (H): ICD-10-CM

## 2024-12-09 DIAGNOSIS — F41.1 GAD (GENERALIZED ANXIETY DISORDER): ICD-10-CM

## 2024-12-09 LAB
GLUCOSE BLDC GLUCOMTR-MCNC: 110 MG/DL (ref 70–99)
GLUCOSE BLDC GLUCOMTR-MCNC: 121 MG/DL (ref 70–99)
HOLD SPECIMEN: NORMAL
HOLD SPECIMEN: NORMAL
INR PPP: 2.21 (ref 0.85–1.15)
MAGNESIUM SERPL-MCNC: 1.7 MG/DL (ref 1.7–2.3)
PHOSPHATE SERPL-MCNC: 3.2 MG/DL (ref 2.5–4.5)
POTASSIUM SERPL-SCNC: 3.5 MMOL/L (ref 3.4–5.3)

## 2024-12-09 PROCEDURE — 99232 SBSQ HOSP IP/OBS MODERATE 35: CPT | Performed by: INTERNAL MEDICINE

## 2024-12-09 PROCEDURE — 250N000013 HC RX MED GY IP 250 OP 250 PS 637: Performed by: EMERGENCY MEDICINE

## 2024-12-09 PROCEDURE — 84100 ASSAY OF PHOSPHORUS: CPT | Performed by: INTERNAL MEDICINE

## 2024-12-09 PROCEDURE — 250N000013 HC RX MED GY IP 250 OP 250 PS 637

## 2024-12-09 PROCEDURE — 97161 PT EVAL LOW COMPLEX 20 MIN: CPT | Mod: GP

## 2024-12-09 PROCEDURE — 250N000013 HC RX MED GY IP 250 OP 250 PS 637: Performed by: INTERNAL MEDICINE

## 2024-12-09 PROCEDURE — 83735 ASSAY OF MAGNESIUM: CPT | Performed by: INTERNAL MEDICINE

## 2024-12-09 PROCEDURE — 120N000002 HC R&B MED SURG/OB UMMC

## 2024-12-09 PROCEDURE — 85610 PROTHROMBIN TIME: CPT

## 2024-12-09 PROCEDURE — 97530 THERAPEUTIC ACTIVITIES: CPT | Mod: GP

## 2024-12-09 PROCEDURE — 36415 COLL VENOUS BLD VENIPUNCTURE: CPT

## 2024-12-09 PROCEDURE — 97110 THERAPEUTIC EXERCISES: CPT | Mod: GP

## 2024-12-09 PROCEDURE — 93005 ELECTROCARDIOGRAM TRACING: CPT

## 2024-12-09 PROCEDURE — 84132 ASSAY OF SERUM POTASSIUM: CPT | Performed by: INTERNAL MEDICINE

## 2024-12-09 RX ORDER — WARFARIN SODIUM 4 MG/1
8 TABLET ORAL
Status: COMPLETED | OUTPATIENT
Start: 2024-12-09 | End: 2024-12-09

## 2024-12-09 RX ADMIN — OXYCODONE HYDROCHLORIDE AND ACETAMINOPHEN 1000 MG: 500 TABLET ORAL at 09:14

## 2024-12-09 RX ADMIN — METFORMIN HYDROCHLORIDE 500 MG: 500 TABLET, FILM COATED ORAL at 17:59

## 2024-12-09 RX ADMIN — TAMSULOSIN HYDROCHLORIDE 0.4 MG: 0.4 CAPSULE ORAL at 18:00

## 2024-12-09 RX ADMIN — SPIRONOLACTONE 50 MG: 25 TABLET ORAL at 09:14

## 2024-12-09 RX ADMIN — METOPROLOL SUCCINATE 100 MG: 50 TABLET, EXTENDED RELEASE ORAL at 09:13

## 2024-12-09 RX ADMIN — B-COMPLEX W/ C & FOLIC ACID TAB 1 TABLET: TAB at 09:13

## 2024-12-09 RX ADMIN — ATORVASTATIN CALCIUM 40 MG: 40 TABLET, FILM COATED ORAL at 09:14

## 2024-12-09 RX ADMIN — LEVOTHYROXINE SODIUM 175 MCG: 100 TABLET ORAL at 04:53

## 2024-12-09 RX ADMIN — Medication 1 TABLET: at 09:14

## 2024-12-09 RX ADMIN — ASPIRIN 81 MG CHEWABLE TABLET 81 MG: 81 TABLET CHEWABLE at 09:14

## 2024-12-09 RX ADMIN — POTASSIUM CHLORIDE 40 MEQ: 1500 TABLET, EXTENDED RELEASE ORAL at 19:59

## 2024-12-09 RX ADMIN — WARFARIN SODIUM 8 MG: 4 TABLET ORAL at 18:00

## 2024-12-09 RX ADMIN — POTASSIUM CHLORIDE 40 MEQ: 1500 TABLET, EXTENDED RELEASE ORAL at 09:14

## 2024-12-09 RX ADMIN — CHLORHEXIDINE GLUCONATE, 0.12% ORAL RINSE 15 ML: 1.2 SOLUTION DENTAL at 09:14

## 2024-12-09 RX ADMIN — TORSEMIDE 60 MG: 20 TABLET ORAL at 12:22

## 2024-12-09 RX ADMIN — METFORMIN HYDROCHLORIDE 500 MG: 500 TABLET, FILM COATED ORAL at 09:14

## 2024-12-09 RX ADMIN — CHLORHEXIDINE GLUCONATE, 0.12% ORAL RINSE 15 ML: 1.2 SOLUTION DENTAL at 19:59

## 2024-12-09 RX ADMIN — Medication 25 MCG: at 09:13

## 2024-12-09 RX ADMIN — MAGNESIUM OXIDE TAB 400 MG (241.3 MG ELEMENTAL MG) 400 MG: 400 (241.3 MG) TAB at 09:14

## 2024-12-09 ASSESSMENT — ACTIVITIES OF DAILY LIVING (ADL)
ADLS_ACUITY_SCORE: 70
ADLS_ACUITY_SCORE: 62
ADLS_ACUITY_SCORE: 70
ADLS_ACUITY_SCORE: 66
ADLS_ACUITY_SCORE: 70
ADLS_ACUITY_SCORE: 62
ADLS_ACUITY_SCORE: 70
ADLS_ACUITY_SCORE: 62
ADLS_ACUITY_SCORE: 66
ADLS_ACUITY_SCORE: 70
ADLS_ACUITY_SCORE: 70
ADLS_ACUITY_SCORE: 62
ADLS_ACUITY_SCORE: 62
ADLS_ACUITY_SCORE: 66
ADLS_ACUITY_SCORE: 66
ADLS_ACUITY_SCORE: 70
ADLS_ACUITY_SCORE: 66

## 2024-12-09 NOTE — PLAN OF CARE
Problem: Fall Injury Risk  Goal: Absence of Fall and Fall-Related Injury  Outcome: Progressing  Intervention: Identify and Manage Contributors  Flowsheets (Taken 12/9/2024 1055)  Self-Care Promotion: independence encouraged  Medication Review/Management: medications reviewed  Intervention: Promote Injury-Free Environment  Flowsheets (Taken 12/9/2024 1055)  Safety Promotion/Fall Prevention:   clutter free environment maintained   nonskid shoes/slippers when out of bed   assistive device/personal items within reach   room near nurse's station   toileting scheduled   safety round/check completed   room door open     Problem: Comorbidity Management  Goal: Blood Glucose Levels Within Targeted Range  Outcome: Progressing  Intervention: Monitor and Manage Glycemia  Flowsheets (Taken 12/9/2024 1055)  Medication Review/Management: medications reviewed  Goal: Maintenance of Heart Failure Symptom Control  Outcome: Progressing  Intervention: Maintain Heart Failure Management  Flowsheets (Taken 12/9/2024 1055)  Medication Review/Management: medications reviewed  Goal: Blood Pressure in Desired Range  Outcome: Progressing  Intervention: Maintain Blood Pressure Management  Flowsheets (Taken 12/9/2024 1055)  Medication Review/Management: medications reviewed  Goal: Bariatric Home Regimen Maintained  Outcome: Progressing  Intervention: Maintain and Manage Postbariatric Surgery Care  Flowsheets (Taken 12/9/2024 1055)  Medication Review/Management: medications reviewed   Goal Outcome Evaluation:       L PIV SL. Assist of 2 with GB and walker. Continent bowel and bladder -- last BM 12/9; see flowsheets. Denies pain. On continuous telemetry. No acute events. Pt expresses no needs at this time call light in reach.

## 2024-12-09 NOTE — PROGRESS NOTES
[facility]    Medicine Progress Note - Hospitalist Service, GOLD TEAM 19    Date of Admission:  12/7/2024    Assessment & Plan   A: Patient is a 75 y/o man who has a past medical history significant for diabetes mellitus type II, chronic kidney disease, heart failure with preserved ejection fraction, atrial fibrillation, depression, obstructive sleep apnea, chronic anemia, hypothyroidism, hyperlipidemia and BPH. Patient had been discharged to home from TCU on 25-Nov-2024 and had been doing well until 07-Dec-2024. Patient had expressed concern that he ws discharged too early but insurance reportedly was arguing whether to cover a longer stay Patient uses a walker to ambulate at baseline. Patient noted being unsteady on his feet and that his legs gave out on him. This led to EMS being called and patient being brought to the hospital. CT head was unremarkable.     Patient was found to have hypokalemia.     Patient noted having tachycardia with activity during hospital stay in 2019. Per chart review, patient did have heart rates increasing into the 150s at that time. Patient did not tolerate increased beta blockers due to hypotension. Patient's episodes of tachycardia was attributed to deconditioning.    P:  1.) Mechanical fall:  - Monitoring for safety.  - PT/OT to see.     2.) Bilateral knee and ankle pain with movement:  - Knee and ankle x-rays were negative for fracture.  - Patient on acetaminophen as needed.     3.) Hypokalemia:  - Supplementing.     4.) Hyponatremia, resolved for now:  - Monitoring as needed.     5.) Hypophosphatemia, resolved:  - Supplementing as needed.     6.) Chronic heart failure with preserved ejection fraction:  - On 25-Oct-2024, echocardiogram showed LVEF 55-60%.  - Patient on torsemide and spironolactone.  - Monitoring for evidence of acute heart failure.     7.) Atrial fibrillation, possibly permanent:  - [id number] score was 5.  - Patient  "on coumadin for anticoagulation. INR therapeutic today.   - Patient on metoprolol for rate control.  - Patient was tachycardic with activity today. Monitoring for recurrence.      8.) Chronic hypercapnic respiratory failure; obstructive sleep apnea:  - Patient to continue CPAP.     9.) Chronic kidney disease, reportedly stage III:  - Monitoring labs as needed.     10.) Diabetes mellitus type II with neuropathy:  - On 07-Oct-2024, HbA1c was 6.2%.  - Patient usually on metformin and this is being continued.  - Monitoring blood glucose twice daily.      11.) Hyperlipidemia:  - Patient on lipitor.     12.) Chronic microcytic anemia:  - Patient on iron supplementation.     13.) Hypothyroidism:  - On 07-Oct-2024, TSH was 0.93.  - Patient on levothyroxine 175 mcg daily.     14.) Possible osteopenia on x-ray:  - Further evaluation as outpatient.     15.) BPH:  - Patient on flomax.     16.) Periodontal disease:  - During previous hospital stay, patient had been seen by dentistry - on evaluation, \"possible root tip maxillary left second molar (#15)\"  - Patient on chlorhexidine oral rinse twice daily.  - Patient should f/u with dentistry as outpatient.     17.) Major depressive disorder; generalized anxiety disorder:  - Patient not currently on medications for these.  - To f/u as outpatient.     18.) Chronic intermittent constipation:  - Bowel regimen.          Diet: Combination Diet Regular Diet Adult    Bazzi Catheter: Not present  Lines: None     Cardiac Monitoring: None  Code Status: No CPR- Do NOT Intubate      Clinically Significant Risk Factors        # Hypokalemia: Lowest K = 2.6 mmol/L in last 2 days, will replace as needed  # Hyponatremia: Lowest Na = 133 mmol/L in last 2 days, will monitor as appropriate  # Hypochloremia: Lowest Cl = 92 mmol/L in last 2 days, will monitor as appropriate    # Hypomagnesemia: Lowest Mg = 1.6 mg/dL in last 2 days, will replace as needed   # Hypoalbuminemia: Lowest albumin = 3.1 g/dL at " 12/8/2024  6:52 AM, will monitor as appropriate     # Hypertension: Noted on problem list  # Chronic heart failure with preserved ejection fraction: heart failure noted on problem list and last echo with EF >50%           # Severe Obesity: Estimated body mass index is 46.97 kg/m  as calculated from the following:    Height as of this encounter: 1.829 m (6').    Weight as of this encounter: 157.1 kg (346 lb 5.5 oz)., PRESENT ON ADMISSION     # Financial/Environmental Concerns:           Social Drivers of Health    Housing Stability: Low Risk  (12/8/2024)    Housing Stability     Do you have housing? : Yes     Are you worried about losing your housing?: No   Recent Concern: Housing Stability - High Risk (9/25/2024)    Housing Stability     Do you have housing? : No     Are you worried about losing your housing?: No          Socrates White MD  Hospitalist Service, GOLD TEAM 19  [facility]  Securely message with [employer] (more info)  Text page via [facility] Paging/Directory   See signed in provider for up to date coverage information  ______________________________________________________________________    Interval History   Patient noted participating with PT today. Patient reportedly was tachycardic with PT. Patient noted no associated symptoms - patient noted no palpitations and no lightheadedness.     Patient noted having tachycardia with activity during hospital stay in 2019. Per chart review, patient did have heart rates increasing into the 150s at that time. Patient did not tolerate increased beta blockers due to hypotension. Patient's episodes of tachycardia was attributed to deconditioning.     Physical Exam   Vital Signs: Temp: 97.4  F (36.3  C) Temp src: Oral BP: 133/66 Pulse: (!) 181   Resp: 20 SpO2: 95 % O2 Device: None (Room air)    Weight: 346 lbs 5.48 oz        Medical Decision Making

## 2024-12-09 NOTE — PROGRESS NOTES
CLINICAL NUTRITION SERVICES - ASSESSMENT NOTE     Nutrition Prescription    RECOMMENDATIONS FOR MDs/PROVIDERS TO ORDER:  Unclear why pt is taking high dose Vitamin C (appears chronic order).  Also getting B vitamin w/ Vitamin C (stress tab).  Linking Vitamin C w/ Fe order would improve absorption (not clear if that would cause confusion with Fe given every other day if ordered this way).    Malnutrition Status:    Patient does not meet two of the established criteria necessary for diagnosing malnutrition    Recommendations already ordered by Registered Dietitian (RD):  - If not meeting goal of at least 100 g protein w/ meal orders, discussed w/ pt that I would ordered 1 Ensure Max Protein (rossy/vanilla) to meet needs.    Future/Additional Recommendations:  - Follow for adequate meal orders.      REASON FOR ASSESSMENT  Clarke Moreira is a/an 74 year old male assessed by the dietitian for Malnutrition Screening Tool (wt loss of 34 lb or more reported) and Provider Order -   nutrition education, concern for malnutrition, supplemental shakes     PMH and other pertinent history  PMH includes DMII, CKD, HFpEF, A fib, depression, THONG, chronic anemia, hypothyroidism, HLD, and BPH who was admitted to [facility] 11/7/24 following fall at home and electrolyte derangements. Of note, recently admitted to [facility] 9/22 to 10/3 with similar presentation and was found to have citrobacter bacteremia. He was then admitted 10/23 with an MICHELLE after fall. Recently discharged from TCU but has since struggled to care for self, perform ADLs.     Patient had been discharged to home from TCU on 25-Nov-2024 and had been doing well until 07-Dec-2024. Patient noted being unsteady on his feet and that his legs gave out on him. This led to EMS being called and patient being brought to the hospital.     NUTRITION HISTORY  Per 10/25/24 RD visit w/ pt: Pt reports reduced appetite. Reports reduced appetite/intake since October 3rd. States he had good  "appetite at last admission but when he was discharged to a TCU he was not eating well d/t constipation and dislike of facility food. Denies N/V. No issues chewing/swallowing or food allergies/intolerances. Reports previous wt of ~363 lbs prior to being at TCU. Declined to order a meal or any snacks/supplements at time of visit. Encouraged small, frequent PO attempts with protein sources. Discussed use of supplements if poor intake continues.     Today pt denies any issues w/ the food at his last TCU stay.  He felt that he was able to prep food and eat adequately once he returned home 11/25/24. This is supported by his stable weight, although did have some drop in electrolytes (?refeeding) earlier in stay.     CURRENT NUTRITION ORDERS  Diet: Regular  Intake/Tolerance: No intake documented in flowsheets yet.  Pt was ordering 2 meals/day that were not meeting his calorie, nor protein needs.  However this morning he ordered a large breakfast w/ 1482 kcal and 64 g protein and reports eating 100% meeting > 50% of his needs for the day.      GI last BM large soft 12/9    LABS  Labs reviewed  Labs wnl now except borderline low Mg.  Low Phos and K+ previously low but corrected and now wnl.    MEDICATIONS  Medications reviewed  Peridex swish & spit 2x/d  Ferrous sulfate 325 mg every other day  Levothyroxine  Mag oxide 400 mg  Metformin 500 mg 2x/d  Thera-vit w/ minerals   K Cl 2x/d  Senna-docusate   Aldactone  Vitamin C 1000 mg  Vitamin B complex w/ Vitamin C (stress tab).  Vitamin D3 25 mcg  PharmD managed warfarin    ANTHROPOMETRICS  Height: 182.9 cm (6' 0\")  Most Recent Weight: (!) 157.1 kg (346 lb 5.5 oz)    IBW: 80.9 kg  BMI: Obesity Grade III BMI >40   Weight History:   Wt Readings from Last 20 Encounters:   12/09/24 (!) 157.1 kg (346 lb 5.5 oz)   11/26/24 (!) 156.2 kg (344 lb 6.4 oz)   11/25/24 (!) 156.2 kg (344 lb 6.4 oz)   11/18/24 (!) 156.9 kg (346 lb)   11/12/24 (!) 153.3 kg (338 lb)   11/07/24 (!) 157.2 kg (346 lb " 9.6 oz)   11/06/24 148.5 kg (327 lb 6.4 oz)   11/04/24 149 kg (328 lb 8 oz)   10/28/24 (!) 159.4 kg (351 lb 6.6 oz)   09/28/24 (!) 166.8 kg (367 lb 11.6 oz)   07/31/24 (!) 181 kg (399 lb)   07/11/24 (!) 181.2 kg (399 lb 8 oz)   03/15/24 (!) 177 kg (390 lb 3.2 oz)   09/13/23 (!) 167.4 kg (369 lb)   07/03/23 (!) 167.4 kg (369 lb)   12/13/22 (!) 183.7 kg (405 lb)   05/09/22 (!) 183.3 kg (404 lb)   12/02/21 (!) 181.4 kg (400 lb)   06/21/21 (!) 175.7 kg (387 lb 6.4 oz)   11/09/20 (!) 180.1 kg (397 lb)     Wt assessment:  Wt stable for past month.  Pt had wt loss previously related to dislike of food at TCU but not noted w/ most recent TCU stay.  Can't r/o fluid retention masking wt loss.     Dosing Weight: 100 kg (adjusted based on 155.7 kg and IBW of 80.9 kg)    ASSESSED NUTRITION NEEDS  Estimated Energy Needs: 2000 - 2500 kcals/day (20 - 25 kcals/kg)  Justification: Maintenance and Obese  Estimated Protein Needs: 100-120 grams protein/day (1.2 g Pro/kg)  Justification: CKD and possible Hypercatabolism with acute illness  Estimated Fluid Needs: 2500+ mL/day (25 ml/kg)   Justification: Maintenance and Per provider pending fluid status    PHYSICAL FINDINGS  See malnutrition section below.  No abnormal nutrition-related physical findings observed.     MALNUTRITION  % Intake: Decreased intake does not meet criteria  % Weight Loss: None noted  Subcutaneous Fat Loss: None observed  Muscle Loss: None observed--but difficult to  w/ morbid obesity  Fluid Accumulation/Edema: None noted  Malnutrition Diagnosis: Patient does not meet two of the established criteria necessary for diagnosing malnutrition    NUTRITION DIAGNOSIS  Predicted inadequate nutrient intake of calories/protein related to past periods of decreased intake w/ illness/disliking food as evidenced by same.      INTERVENTIONS  Implementation  Medical food supplement therapy--will be added if pt not receiving at least 100 g protein/d from meals over next couple  "of days.      Goals  Patient to consume % of nutritionally adequate meal trays TID, or the equivalent with supplements/snacks.     Monitoring/Evaluation  Progress toward goals will be monitored and evaluated per protocol.  Mi Heard RD, LD   6 & 8 Med/Surg RD  Mon-Fri Vocera: \"6 Med Surg Clinical Dietitian\" or \"8 Med Surg Clinical Dietitian\"  Weekend RD Vocera (Global): \"Weekend Holiday Clinical Dietitian\"        "

## 2024-12-09 NOTE — PROGRESS NOTES
Telemedicine Visit: The patient's condition can be safely assessed and treated via an audio only encounter.      Reason for Telemedicine Visit: Patient has requested telehealth visit and Patient unable to travel    Originating Site (Patient Location): Patient's other patient is in the hospital after a fall on Saturday    Distant Location (provider location):  On-site    Consent:  The patient/guardian has verbally consented to: the potential risks and benefits of telemedicine (video visit) versus in person care; bill my insurance or make self-payment for services provided; and responsibility for payment of non-covered services.     Mode of Communication:  Telephone call via [employer]    As the provider I attest to compliance with applicable laws and regulations related to telemedicine.    [facility] Primary Care: : Integrated Behavioral Health  December 9, 2024    Behavioral Health Clinician Progress Note    Patient Name: Clarke Moreira           Service Type:  Individual      Service Location:   Phone call (patient / identified key support person reached)     Session Start Time: 8:20  Session End Time: 8:55      Session Length: 16 - 37      Attendees: Client     Service Modality:  Phone Visit    Visit Activities (Refresh list every visit): [facility] Only    Diagnostic Assessment Date: last one was 4/1/25  Treatment Plan Review Date: last one was 4/1/25  See Flowsheets for today's PHQ-9 and CHRIS-7 results  Previous PHQ-9:       9/13/2023     9:19 AM 3/15/2024     9:48 AM 7/11/2024     9:37 AM   PHQ-9 SCORE   PHQ-9 Total Score MyChart 24 (Severe depression) 19 (Moderately severe depression) 0   PHQ-9 Total Score 24 19 0     Previous CHRIS-7:       12/11/2018    10:31 AM 5/10/2019     8:26 AM 9/3/2019    10:35 AM   CRHIS-7 SCORE   Total Score 20 13 19     Treatment Objective(s) Addressed in This Session:  Goal 1: Clarke will learn/use coping skills to continue to reduce intensity/frequency of suicidal  thoughts     Goal 2: Clarke will challenge/reframe negative harsh thoughts that he has about himself.     Current Stressors / Issues:  Clarke describes his mood as OK, depressed, but . Was feeling frustrated because the rehab facility did not think he was ready for discharge, but insurance would not continue to pay despite the progress he was making. He was discharged home with home health services.  He fell two times on Saturday. Was able to get back up the first time, but the second time was not able to do so, so crawled to his phone and called EMS who came and transported him to the hospital.  Experiencing anxiety and low mood, but there is also a decrease in negative thoughts occurring right now per his report.  Discussed focusing on the things that he has control over in his life. Choosing reactions more mindfully as he goes through the hospitalization and rehabilitation.       Patients Response to treatment Interventions.  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      Assessment: Current Emotional / Mental Status (status of significant symptoms):  Risk status (Self / Other harm or suicidal ideation)    Patient denies current fears or concerns for personal safety.  Patient denies current or recent suicidal ideation or behaviors.  Patient denies current or recent homicidal ideation or behaviors.  Patient denies current or recent self injurious behavior or ideation.  Patient denies other safety concerns.      Appearance:   N/a telephone visit   Eye Contact:   N/a telephone visit   Psychomotor Behavior: N/a telephone visit    Attitude:   Cooperative  Interested Friendly Attentive  Orientation:   All  Speech   Rate / Production: Normal/ Responsive   Volume:  Normal   Mood:    Sad   Affect:    Appropriate   Thought Content:  Clear   Thought Form:  Coherent  Logical   Insight:    Good     Diagnoses:  Persistent Depressive Disorder  Major Depression, recurrent, moderate  Generalized Anxiety Disorder      Recommendations & Plan:   Follow up on 1/9  Will check with Dr. Shepard if appt with him is needed this afternoon since Clarke is in the hospital. If not, someone will call Clarke to cancel it.     Lety Buenrostro PsyD, LP

## 2024-12-09 NOTE — PLAN OF CARE
"  Problem: Adult Inpatient Plan of Care  Goal: Plan of Care Review  Description: The Plan of Care Review/Shift note should be completed every shift.  The Outcome Evaluation is a brief statement about your assessment that the patient is improving, declining, or no change.  This information will be displayed automatically on your shift  note.  Outcome: Progressing  Flowsheets (Taken 12/8/2024 2240)  Plan of Care Reviewed With: patient  Overall Patient Progress: no change  Goal: Patient-Specific Goal (Individualized)  Description: You can add care plan individualizations to a care plan. Examples of Individualization might be:  \"Parent requests to be called daily at 9am for status\", \"I have a hard time hearing out of my right ear\", or \"Do not touch me to wake me up as it startles  me\".  Outcome: Progressing  Goal: Absence of Hospital-Acquired Illness or Injury  Outcome: Progressing  Intervention: Identify and Manage Fall Risk  Recent Flowsheet Documentation  Taken 12/8/2024 2239 by Keenan Castillo RN  Safety Promotion/Fall Prevention:   clutter free environment maintained   nonskid shoes/slippers when out of bed   assistive device/personal items within reach   room near nurse's station  Intervention: Prevent Skin Injury  Recent Flowsheet Documentation  Taken 12/8/2024 2239 by Keenan Castillo RN  Body Position: supine, head elevated  Intervention: Prevent Infection  Recent Flowsheet Documentation  Taken 12/8/2024 2239 by Keenan Castillo, RN  Infection Prevention:   hand hygiene promoted   single patient room provided  Goal: Optimal Comfort and Wellbeing  Outcome: Progressing  Goal: Readiness for Transition of Care  Outcome: Progressing   Goal Outcome Evaluation:      Plan of Care Reviewed With: patient    Overall Patient Progress: no change  Patient is alert and oriented x4, able to make needs known to staff. Assist of 2 with transfers using walker and GB to bathroom. Continent of bowel and bladder. Pt denies " pain this shift. No acute event noted this shift, call light within reach. Continue POC.

## 2024-12-09 NOTE — PROGRESS NOTES
"   12/09/24 0959   Appointment Info   Signing Clinician's Name / Credentials (PT) Demetra Joya, PT, DPT   Living Environment   People in Home alone   Current Living Arrangements apartment   Home Accessibility no concerns  (Elevator access)   Transportation Anticipated public transportation   Self-Care   Usual Activity Tolerance moderate   Current Activity Tolerance poor   Equipment Currently Used at Home walker, rolling   Fall history within last six months yes   Number of times patient has fallen within last six months 4  (\"in the last 90 days\")   Activity/Exercise/Self-Care Comment Patient reports previous IND/mod I with mobility and some ADLs. Reports that he does not bath due to not feeling confident with getting in/out of tub shower. Does not do laundry. Reports he was discharged from TCU \"too soon\", has been struggling at home. Multiple falls at home, reports limited social support.   General Information   Onset of Illness/Injury or Date of Surgery 12/07/24   Referring Physician Erick Bazan MD   Patient/Family Therapy Goals Statement (PT) \"Stop falling, to not lose all of the gain I made at the last TCU\"   Pertinent History of Current Problem (include personal factors and/or comorbidities that impact the POC) Patient is a 73 y/o man who has a past medical history significant for diabetes mellitus type II, chronic kidney disease, heart failure with preserved ejection fraction, atrial fibrillation, depression, obstructive sleep apnea, chronic anemia, hypothyroidism, hyperlipidemia and BPH. Patient had been discharged to home from TCU on 25-Nov-2024 and had been doing well until 07-Dec-2024. Patient had expressed concern that he ws discharged too early but insurance reportedly was arguing whether to cover a longer stay Patient uses a walker to ambulate at baseline. Patient noted being unsteady on his feet and that his legs gave out on him. This led to EMS being called and patient being brought to " the hospital. CT head was unremarkable.   Existing Precautions/Restrictions fall   Weight-Bearing Status - LLE weight-bearing as tolerated   Weight-Bearing Status - RLE weight-bearing as tolerated   Cognition   Affect/Mental Status (Cognition) WFL   Orientation Status (Cognition) oriented x 3   Follows Commands (Cognition) WFL   Range of Motion (ROM)   Range of Motion ROM deficits secondary to weakness   Strength (Manual Muscle Testing)   Strength (Manual Muscle Testing) Deficits observed during functional mobility   Bed Mobility   Bed Mobility supine-sit   Supine-Sit [place] (Bed Mobility) moderate assist (50% patient effort);1 person assist   Transfers   Transfers sit-stand transfer   Sit-Stand Transfer   Sit-Stand [place] (Transfers) moderate assist (50% patient effort);1 person assist   Assistive Device (Sit-Stand Transfers) walker, front-wheeled   Gait/Stairs (Locomotion)   Comment, (Gait/Stairs) Unable to progress to gait due to increased HR in standing and BLE instability in standing.   Balance   Balance Comments Patient benefits from BUE support on FWW for safe mobility at this time.   Clinical Impression   Criteria for Skilled Therapeutic Intervention Yes, treatment indicated   PT Diagnosis (PT) Impaired functional mobility   Influenced by the following impairments Weakness   Functional limitations due to impairments Bed mobility, transfers, gait   Clinical Presentation (PT Evaluation Complexity) stable   Clinical Presentation Rationale Per clinical judgement   Clinical Decision Making (Complexity) low complexity   Planned Therapy Interventions (PT) balance training;bed mobility training;gait training;home exercise program;neuromuscular re-education;patient/family education;stair training;strengthening;transfer training   Risk & Benefits of therapy have been explained evaluation/treatment results reviewed;care plan/treatment goals reviewed;participants voiced agreement with care plan;participants  included;patient   PT Total Evaluation Time   PT Eval, Low Complexity Minutes (04934) 9   Physical Therapy Goals   PT Frequency 5x/week   PT Predicted Duration/Target Date for Goal Attainment 12/16/24   PT Goals Bed Mobility;Transfers;Gait;PT Goal 1   PT: Bed Mobility Minimal assist;Supine to/from sit   PT: Transfers Sit to/from stand;Bed to/from chair;Supervision/stand-by assist;Assistive device   PT: Gait Minimal assist;Assistive device;25 feet   PT: Goal 1 Patient will be IND with HEP in order to improve strength and overall IND with mobility.   Interventions   Interventions Quick Adds Therapeutic Activity;Therapeutic Procedure   Therapeutic Procedure/Exercise   Ther. Procedure: strength, endurance, ROM, flexibillity Minutes (47891) 15   Symptoms Noted During/After Treatment fatigue   Treatment Detail/Skilled Intervention Patient performed seated [facility] exercises x 10 with verbal cueing and visual demonstration required for proper technique. HR between 120s-130s throughout exercises, increased to 155 at highest during rest break.   Therapeutic Activity   Therapeutic Activities: dynamic activities to improve functional performance Minutes (70522) 15   Symptoms Noted During/After Treatment Fatigue;Significant change in vital signs   Treatment Detail/Skilled Intervention Patient supine in bed on arrival, agreeable to therapy. Hands on physical assistance provided at [facility] for bed mobility, use of bed rails as well. Hands on physical assistance provided for initial sit <> stand attempt, patient only able to achieve partial standing, HR prior to stand 120s-130s, increased to 157, no symptoms reported. Stand to sit with mod ax 1. HR between 130s-150s for 3-5 minutes following stand. Attempted 1 additional stand with min ax 1 and FWW, verbal cueing for hand placement and height of bed elevated. Patient able to achieve upright standing on this attempt with HR increasing to 181 at highest. Stand to sit with min aX 1. HR  improved to 120s-130s within 4-6 minutes of rest at EOB, patient continuing to report no symptoms. Seated scooting towards HOB with SBA x 1. Sit to supine with mod a x1 assistance at BLE. Supine scooting towards HOB with verbal cueing for set up, patient able to perform without physical assistance and use of bed rails. Patient supine in bed with call light within reach and bed alarm armed at end of session.   PT Discharge Planning   PT Plan Bed mobility, transfers as tolerated, initiate gait when able, HEP   PT Discharge Recommendation (DC Rec) Transitional Care Facility   PT Rationale for DC Rec Patient currently requires assist of 1 for safe bed mobility and transfers; at this time he is unable to attempt gait due to LE instability in static standing. He lives alone in an apartment and does not have access to assistance. He has access to walker and uses it at baseline. Recommend patient discharges to TCU in order to improve strength and independence with mobility.   PT Brief overview of current status Recommend assist x 2 with nursing for safety   Physical Therapy Time and Intention   Timed Code Treatment Minutes 30   Total Session Time (sum of timed and untimed services) 39     Demetra Joya, PT, DPT

## 2024-12-10 ENCOUNTER — APPOINTMENT (OUTPATIENT)
Dept: PHYSICAL THERAPY | Facility: CLINIC | Age: 74
DRG: 312 | End: 2024-12-10
Payer: COMMERCIAL

## 2024-12-10 ENCOUNTER — DOCUMENTATION ONLY (OUTPATIENT)
Dept: FAMILY MEDICINE | Facility: CLINIC | Age: 74
End: 2024-12-10
Payer: COMMERCIAL

## 2024-12-10 LAB
ANION GAP SERPL CALCULATED.3IONS-SCNC: 8 MMOL/L (ref 7–15)
ATRIAL RATE - MUSE: 97 BPM
BUN SERPL-MCNC: 10.8 MG/DL (ref 8–23)
CALCIUM SERPL-MCNC: 9 MG/DL (ref 8.8–10.4)
CHLORIDE SERPL-SCNC: 102 MMOL/L (ref 98–107)
CREAT SERPL-MCNC: 1.01 MG/DL (ref 0.67–1.17)
DIASTOLIC BLOOD PRESSURE - MUSE: NORMAL MMHG
EGFRCR SERPLBLD CKD-EPI 2021: 78 ML/MIN/1.73M2
GLUCOSE BLDC GLUCOMTR-MCNC: 127 MG/DL (ref 70–99)
GLUCOSE BLDC GLUCOMTR-MCNC: 166 MG/DL (ref 70–99)
GLUCOSE SERPL-MCNC: 96 MG/DL (ref 70–99)
HCO3 SERPL-SCNC: 31 MMOL/L (ref 22–29)
HOLD SPECIMEN: NORMAL
INR PPP: 1.77 (ref 0.85–1.15)
INTERPRETATION ECG - MUSE: NORMAL
MAGNESIUM SERPL-MCNC: 1.6 MG/DL (ref 1.7–2.3)
P AXIS - MUSE: NORMAL DEGREES
PHOSPHATE SERPL-MCNC: 3.1 MG/DL (ref 2.5–4.5)
POTASSIUM SERPL-SCNC: 4.3 MMOL/L (ref 3.4–5.3)
PR INTERVAL - MUSE: NORMAL MS
QRS DURATION - MUSE: 90 MS
QT - MUSE: 376 MS
QTC - MUSE: 454 MS
R AXIS - MUSE: 58 DEGREES
SODIUM SERPL-SCNC: 141 MMOL/L (ref 135–145)
SYSTOLIC BLOOD PRESSURE - MUSE: NORMAL MMHG
T AXIS - MUSE: 79 DEGREES
VENTRICULAR RATE- MUSE: 88 BPM

## 2024-12-10 PROCEDURE — 120N000002 HC R&B MED SURG/OB UMMC

## 2024-12-10 PROCEDURE — 250N000013 HC RX MED GY IP 250 OP 250 PS 637: Performed by: EMERGENCY MEDICINE

## 2024-12-10 PROCEDURE — 250N000013 HC RX MED GY IP 250 OP 250 PS 637: Performed by: INTERNAL MEDICINE

## 2024-12-10 PROCEDURE — 250N000013 HC RX MED GY IP 250 OP 250 PS 637

## 2024-12-10 PROCEDURE — 84100 ASSAY OF PHOSPHORUS: CPT | Performed by: INTERNAL MEDICINE

## 2024-12-10 PROCEDURE — 97110 THERAPEUTIC EXERCISES: CPT | Mod: GP

## 2024-12-10 PROCEDURE — 97530 THERAPEUTIC ACTIVITIES: CPT | Mod: GP

## 2024-12-10 PROCEDURE — 36415 COLL VENOUS BLD VENIPUNCTURE: CPT

## 2024-12-10 PROCEDURE — 80048 BASIC METABOLIC PNL TOTAL CA: CPT | Performed by: INTERNAL MEDICINE

## 2024-12-10 PROCEDURE — 83735 ASSAY OF MAGNESIUM: CPT | Performed by: INTERNAL MEDICINE

## 2024-12-10 PROCEDURE — 85610 PROTHROMBIN TIME: CPT

## 2024-12-10 PROCEDURE — 99232 SBSQ HOSP IP/OBS MODERATE 35: CPT | Performed by: INTERNAL MEDICINE

## 2024-12-10 PROCEDURE — 82565 ASSAY OF CREATININE: CPT | Performed by: INTERNAL MEDICINE

## 2024-12-10 RX ORDER — WARFARIN SODIUM 4 MG/1
8 TABLET ORAL
Status: COMPLETED | OUTPATIENT
Start: 2024-12-10 | End: 2024-12-10

## 2024-12-10 RX ADMIN — TORSEMIDE 60 MG: 20 TABLET ORAL at 12:12

## 2024-12-10 RX ADMIN — CHLORHEXIDINE GLUCONATE, 0.12% ORAL RINSE 15 ML: 1.2 SOLUTION DENTAL at 08:35

## 2024-12-10 RX ADMIN — SPIRONOLACTONE 50 MG: 25 TABLET ORAL at 08:36

## 2024-12-10 RX ADMIN — FERROUS SULFATE TAB 325 MG (65 MG ELEMENTAL FE) 325 MG: 325 (65 FE) TAB at 08:36

## 2024-12-10 RX ADMIN — METFORMIN HYDROCHLORIDE 500 MG: 500 TABLET, FILM COATED ORAL at 08:36

## 2024-12-10 RX ADMIN — Medication 1 TABLET: at 08:36

## 2024-12-10 RX ADMIN — Medication 25 MCG: at 08:36

## 2024-12-10 RX ADMIN — ATORVASTATIN CALCIUM 40 MG: 40 TABLET, FILM COATED ORAL at 08:36

## 2024-12-10 RX ADMIN — LEVOTHYROXINE SODIUM 175 MCG: 100 TABLET ORAL at 05:05

## 2024-12-10 RX ADMIN — OXYCODONE HYDROCHLORIDE AND ACETAMINOPHEN 1000 MG: 500 TABLET ORAL at 08:35

## 2024-12-10 RX ADMIN — CHLORHEXIDINE GLUCONATE, 0.12% ORAL RINSE 15 ML: 1.2 SOLUTION DENTAL at 20:32

## 2024-12-10 RX ADMIN — MAGNESIUM OXIDE TAB 400 MG (241.3 MG ELEMENTAL MG) 400 MG: 400 (241.3 MG) TAB at 08:36

## 2024-12-10 RX ADMIN — ACETAMINOPHEN 650 MG: 325 TABLET, FILM COATED ORAL at 08:51

## 2024-12-10 RX ADMIN — WARFARIN SODIUM 8 MG: 4 TABLET ORAL at 18:10

## 2024-12-10 RX ADMIN — ASPIRIN 81 MG CHEWABLE TABLET 81 MG: 81 TABLET CHEWABLE at 08:36

## 2024-12-10 RX ADMIN — POTASSIUM CHLORIDE 40 MEQ: 1500 TABLET, EXTENDED RELEASE ORAL at 08:36

## 2024-12-10 RX ADMIN — METFORMIN HYDROCHLORIDE 500 MG: 500 TABLET, FILM COATED ORAL at 18:10

## 2024-12-10 RX ADMIN — POTASSIUM CHLORIDE 40 MEQ: 1500 TABLET, EXTENDED RELEASE ORAL at 20:31

## 2024-12-10 RX ADMIN — TAMSULOSIN HYDROCHLORIDE 0.4 MG: 0.4 CAPSULE ORAL at 18:10

## 2024-12-10 RX ADMIN — B-COMPLEX W/ C & FOLIC ACID TAB 1 TABLET: TAB at 08:35

## 2024-12-10 ASSESSMENT — ACTIVITIES OF DAILY LIVING (ADL)
ADLS_ACUITY_SCORE: 68
ADLS_ACUITY_SCORE: 66
ADLS_ACUITY_SCORE: 68
ADLS_ACUITY_SCORE: 66
ADLS_ACUITY_SCORE: 68
ADLS_ACUITY_SCORE: 66
ADLS_ACUITY_SCORE: 66
ADLS_ACUITY_SCORE: 68
ADLS_ACUITY_SCORE: 66
ADLS_ACUITY_SCORE: 68
ADLS_ACUITY_SCORE: 66
ADLS_ACUITY_SCORE: 68
ADLS_ACUITY_SCORE: 66
DEPENDENT_IADLS:: CLEANING;MEAL PREPARATION;TRANSPORTATION
ADLS_ACUITY_SCORE: 66
ADLS_ACUITY_SCORE: 68
ADLS_ACUITY_SCORE: 66
ADLS_ACUITY_SCORE: 68
ADLS_ACUITY_SCORE: 66

## 2024-12-10 NOTE — PLAN OF CARE
Problem: Fall Injury Risk  Goal: Absence of Fall and Fall-Related Injury  Outcome: Progressing  Intervention: Identify and Manage Contributors  Recent Flowsheet Documentation  Taken 12/10/2024 0920 by Al Singh RN  Medication Review/Management: medications reviewed  Intervention: Promote Injury-Free Environment  Recent Flowsheet Documentation  Taken 12/10/2024 0920 by Al Singh RN  Safety Promotion/Fall Prevention:   activity supervised   assistive device/personal items within reach   clutter free environment maintained   increased rounding and observation   lighting adjusted   nonskid shoes/slippers when out of bed   room door open   room near nurse's station     Problem: Comorbidity Management  Goal: Blood Glucose Levels Within Targeted Range  Outcome: Progressing  Intervention: Monitor and Manage Glycemia  Recent Flowsheet Documentation  Taken 12/10/2024 0920 by Al Singh RN  Medication Review/Management: medications reviewed  Goal: Maintenance of Heart Failure Symptom Control  Outcome: Progressing  Intervention: Maintain Heart Failure Management  Recent Flowsheet Documentation  Taken 12/10/2024 0920 by Al Singh RN  Medication Review/Management: medications reviewed  Goal: Blood Pressure in Desired Range  Outcome: Progressing  Intervention: Maintain Blood Pressure Management  Recent Flowsheet Documentation  Taken 12/10/2024 0920 by Al Singh RN  Medication Review/Management: medications reviewed  Goal: Bariatric Home Regimen Maintained  Outcome: Progressing  Intervention: Maintain and Manage Postbariatric Surgery Care  Recent Flowsheet Documentation  Taken 12/10/2024 0920 by Al Singh RN  Medication Review/Management: medications reviewed   Goal Outcome Evaluation:       L PIV SL. Assist of 2 with GB and rolling walker. Continent bowel and bladder -- last BM 12/9. Pain managed with PRNs. On telemetry. BLE ruddiness noted; moisturized this shift per pt care order. No  acute events. Pt expresses no needs at this time call light in reach.

## 2024-12-10 NOTE — PLAN OF CARE
Goal Outcome Evaluation:           Overall Patient Progress: no changeOverall Patient Progress: no change

## 2024-12-10 NOTE — PROGRESS NOTES
[facility]    Medicine Progress Note - Hospitalist Service, GOLD TEAM 19    Date of Admission:  12/7/2024    Assessment & Plan   A: Patient is a 73 y/o man who has a past medical history significant for diabetes mellitus type II, chronic kidney disease, heart failure with preserved ejection fraction, atrial fibrillation, depression, obstructive sleep apnea, chronic anemia, hypothyroidism, hyperlipidemia and BPH. Patient had been discharged to home from TCU on 25-Nov-2024 and had been doing well until 07-Dec-2024. Patient had expressed concern that he ws discharged too early but insurance reportedly was arguing whether to cover a longer stay Patient uses a walker to ambulate at baseline. Patient noted being unsteady on his feet and that his legs gave out on him. This led to EMS being called and patient being brought to the hospital. CT head was unremarkable.     Patient was found to have hypokalemia.     Patient noted having tachycardia with activity during hospital stay in 2019. Per chart review, patient did have heart rates increasing into the 150s at that time. Patient did not tolerate increased beta blockers due to hypotension. Patient's episodes of tachycardia was attributed to deconditioning.    Assessment and plan  1.) Mechanical fall:  - Monitoring for safety.  - PT/OT to see.     2.) Bilateral knee and ankle pain with movement:  - Knee and ankle x-rays were negative for fracture.  - Patient on acetaminophen as needed.     3.) Hypokalemia:  - Supplementing.     4.) Hyponatremia, resolved for now:  - Monitoring as needed.     5.) Hypophosphatemia, resolved:  - Supplementing as needed.     6.) Chronic heart failure with preserved ejection fraction:  - On 25-Oct-2024, echocardiogram showed LVEF 55-60%.  - Patient on torsemide and spironolactone.  - Monitoring for evidence of acute heart failure.     7.) Atrial fibrillation, possibly permanent:  - [id number] score  "was 5.  - Patient on coumadin for anticoagulation. INR therapeutic today.   - Patient on metoprolol for rate control.  - Patient was tachycardic with activity today. Monitoring for recurrence.      8.) Chronic hypercapnic respiratory failure; obstructive sleep apnea:  - Patient to continue CPAP.     9.) Chronic kidney disease, reportedly stage III:  - Monitoring labs as needed.     10.) Diabetes mellitus type II with neuropathy:  - On 07-Oct-2024, HbA1c was 6.2%.  - Patient usually on metformin and this is being continued.  - Monitoring blood glucose twice daily.      11.) Hyperlipidemia:  - Patient on lipitor.     12.) Chronic microcytic anemia:  - Patient on iron supplementation.     13.) Hypothyroidism:  - On 07-Oct-2024, TSH was 0.93.  - Patient on levothyroxine 175 mcg daily.     14.) Possible osteopenia on x-ray:  - Further evaluation as outpatient.     15.) BPH:  - Patient on flomax.     16.) Periodontal disease:  - During previous hospital stay, patient had been seen by dentistry - on evaluation, \"possible root tip maxillary left second molar (#15)\"  - Patient on chlorhexidine oral rinse twice daily.  - Patient should f/u with dentistry as outpatient.     17.) Major depressive disorder; generalized anxiety disorder:  - Patient not currently on medications for these.  - To f/u as outpatient.     18.) Chronic intermittent constipation:  - Bowel regimen.          Diet: Combination Diet Regular Diet Adult    Bazzi Catheter: Not present  Lines: None     Cardiac Monitoring: ACTIVE order. Indication: Tachyarrhythmias, acute (48 hours)  Code Status: No CPR- Do NOT Intubate      Clinically Significant Risk Factors             # Hypomagnesemia: Lowest Mg = 1.6 mg/dL in last 2 days, will replace as needed   # Hypoalbuminemia: Lowest albumin = 3.1 g/dL at 12/8/2024  6:52 AM, will monitor as appropriate     # Hypertension: Noted on problem list    # Chronic heart failure with preserved ejection fraction: heart failure " noted on problem list and last echo with EF >50%           # Severe Obesity: Estimated body mass index is 46.55 kg/m  as calculated from the following:    Height as of this encounter: 1.829 m (6').    Weight as of this encounter: 155.7 kg (343 lb 4.1 oz)., PRESENT ON ADMISSION       # Financial/Environmental Concerns:           Social Drivers of Health    Housing Stability: Low Risk  (12/8/2024)    Housing Stability     Do you have housing? : Yes     Are you worried about losing your housing?: No   Recent Concern: Housing Stability - High Risk (9/25/2024)    Housing Stability     Do you have housing? : No     Are you worried about losing your housing?: No          Moiz Dial MD  Hospitalist Service, GOLD TEAM 19  [facility]  Securely message with [employer] (more info)  Text page via [facility] Paging/Directory   See signed in provider for up to date coverage information  ______________________________________________________________________    Interval History   Patient reports increased edema of the legs as he was not wearing compression stockings for a couple of days   Patient reportedly was tachycardic with PT. Patient noted no associated symptoms - patient noted no palpitations and no lightheadedness. Denies feeling dizzy lying down or during PT sessions.     Patient noted having tachycardia with activity during hospital stay in 2019. Per chart review, patient did have heart rates increasing into the 150s at that time. Patient did not tolerate increased beta blockers due to hypotension. Patient's episodes of tachycardia was attributed to deconditioning.     Physical Exam   Vital Signs: Temp: 97.6  F (36.4  C) Temp src: Oral BP: 112/57 Pulse: 93   Resp: 18 SpO2: 98 % O2 Device: None (Room air)    Weight: 343 lbs 4.1 oz    Labs  CBC RESULTS:   Recent Labs   Lab Test 12/08/24  0652   WBC 7.5   RBC 4.52   HGB 11.3*   HCT 35.6*   MCV 79   MCH 25.0*   MCHC 31.7   RDW 18.8*         Last Comprehensive Metabolic Panel:  Lab Results   Component Value Date     12/10/2024    POTASSIUM 4.3 12/10/2024    CHLORIDE 102 12/10/2024    CO2 31 (H) 12/10/2024    ANIONGAP 8 12/10/2024     (H) 12/10/2024    BUN 10.8 12/10/2024    CR 1.01 12/10/2024    GFRESTIMATED 78 12/10/2024    CHERI 9.0 12/10/2024           Medical Decision Making   Total time spent is 38 minutes

## 2024-12-10 NOTE — PLAN OF CARE
[id number] - [id number]    Problem: Adult Inpatient Plan of Care  Goal: Plan of Care Review  Description: The Plan of Care Review/Shift note should be completed every shift.  The Outcome Evaluation is a brief statement about your assessment that the patient is improving, declining, or no change.  This information will be displayed automatically on your shift  note.  Flowsheets (Taken 12/10/2024 0205)  Plan of Care Reviewed With: patient  Overall Patient Progress: no change  Goal: Absence of Hospital-Acquired Illness or Injury  Outcome: Progressing  Intervention: Prevent Skin Injury  Recent Flowsheet Documentation  Taken 12/10/2024 0200 by Leonor Callaway RN  Body Position: supine, head elevated  Intervention: Prevent and Manage VTE (Venous Thromboembolism) Risk  Recent Flowsheet Documentation  Taken 12/10/2024 0200 by Leonor Callaway RN  VTE Prevention/Management: SCDs off (sequential compression devices)  Goal: Optimal Comfort and Wellbeing  Outcome: Progressing     Problem: Fall Injury Risk  Goal: Absence of Fall and Fall-Related Injury  Outcome: Progressing     Pt. is Awake, alert, oriented x 4, not in distress, able to make needs known, on regular Diet and A2 with GB and walker. Visible skin intact with L PIV SL Voids spontaneously without difficulty to urinal. No complaints of pain or discomfort. Call light within reach,Continue with plan of care.    Goal Outcome Evaluation:    Plan of Care Reviewed With: patient  Overall Patient Progress: no change

## 2024-12-10 NOTE — PROGRESS NOTES
"When opening a documentation only encounter, be sure to enter in \"Chief Complaint\" Forms and in \" Comments\" Title of form, description if needed.    Angel is a 74 year old  male  Form received via: Fax  Form now resides in: Provider Ready    Erna Schwartz MA             Form has been completed by provider.     Form sent out via: Fax to [employer] at Fax Number: [phone]  Patient informed: N/A  Output date: December 12, 2024    Erna Schwartz MA      **Please close the encounter**   "

## 2024-12-10 NOTE — CONSULTS
Care Management Initial Consult    General Information  Assessment completed with: Patient,    Type of CM/SW Visit: Initial Assessment    Primary Care Provider verified and updated as needed: Yes   Readmission within the last 30 days: no previous admission in last 30 days      Reason for Consult: discharge planning  Advance Care Planning: Advance Care Planning Reviewed: no concerns identified  Pt states he has a HCD and declined to complete another. Pt confirmed DNR/DNI status       Communication Assessment  Patient's communication style: spoken language (English or Bilingual)    Hearing Difficulty or Deaf: no   Wear Glasses or Blind: yes    Cognitive  Cognitive/Neuro/Behavioral: WDL  Level of Consciousness: alert  Arousal Level: opens eyes spontaneously  Orientation: oriented x 4  Mood/Behavior: behavior appropriate to situation, cooperative, calm  Best Language: 0 - No aphasia  Speech: clear, logical, spontaneous    Living Environment:   People in home: alone     Current living Arrangements: apartment      Able to return to prior arrangements: other (see comments) (TBD)       Family/Social Support:  Care provided by: self  Provides care for: no one, unable/limited ability to care for self  Marital Status: Single  Support system: Friend          Description of Support System: Supportive    Support Assessment: Lacks necessary supervision and assistance, Lacks adequate physical care, Lacks adequate emotional support    Current Resources:   Patient receiving home care services: No        Community Resources: Meals on Wheels, Transportation Services, Volunteer  Equipment currently used at home: walker, rolling, hospital bed, wheelchair, manual, cane, straight, shower chair  Supplies currently used at home: Compression Stockings, Oxygen Tubing/Supplies (CPAP with O2 bleed-in)    Employment/Financial:  Employment Status: retired        Financial Concerns: none           Does the patient's insurance plan have a 3 day  qualifying hospital stay waiver?  No    Lifestyle & Psychosocial Needs:  Social Drivers of Health     Food Insecurity: Low Risk  (12/8/2024)    Food Insecurity     Within the past 12 months, did you worry that your food would run out before you got money to buy more?: No     Within the past 12 months, did the food you bought just not last and you didn t have money to get more?: No   Depression: Not at risk (7/11/2024)    PHQ-2     PHQ-2 Score: 0   Housing Stability: Low Risk  (12/8/2024)    Housing Stability     Do you have housing? : Yes     Are you worried about losing your housing?: No   Recent Concern: Housing Stability - High Risk (9/25/2024)    Housing Stability     Do you have housing? : No     Are you worried about losing your housing?: No   Tobacco Use: Low Risk  (7/31/2024)    Patient History     Smoking Tobacco Use: Never     Smokeless Tobacco Use: Never     Passive Exposure: Not on file   Financial Resource Strain: Low Risk  (12/8/2024)    Financial Resource Strain     Within the past 12 months, have you or your family members you live with been unable to get utilities (heat, electricity) when it was really needed?: No   Alcohol Use: Not on file   Transportation Needs: Low Risk  (12/8/2024)    Transportation Needs     Within the past 12 months, has lack of transportation kept you from medical appointments, getting your medicines, non-medical meetings or appointments, work, or from getting things that you need?: No   Physical Activity: Not on file   Interpersonal Safety: Low Risk  (12/8/2024)    Interpersonal Safety     Do you feel physically and emotionally safe where you currently live?: Yes     Within the past 12 months, have you been hit, slapped, kicked or otherwise physically hurt by someone?: No     Within the past 12 months, have you been humiliated or emotionally abused in other ways by your partner or ex-partner?: No   Stress: Not on file   Social Connections: Not on file   Health Literacy: Not  "on file       Functional Status:  Prior to admission patient needed assistance:   Dependent ADLs:: Ambulation-walker  Dependent IADLs:: Cleaning, Meal Preparation, Transportation  Assesssment of Functional Status: Not at baseline with mobility, Not at baseline with ADL Functioning    Mental Health Status:  Mental Health Status: Current Concern  Mental Health Management: Medication    Chemical Dependency Status:  Chemical Dependency Status: No Current Concerns             Values/Beliefs:  Spiritual, Cultural Beliefs, Baptism Practices, Values that affect care: no               Discussed  Partnership in Safe Discharge Planning  document with patient/family: No      Additional Information:  Per H&P \"Patient is a 75 y/o man who has a past medical history significant for diabetes mellitus type II, chronic kidney disease, heart failure with preserved ejection fraction, atrial fibrillation, depression, obstructive sleep apnea, chronic anemia, hypothyroidism, hyperlipidemia and BPH. Patient had been discharged to home from TCU on 25-Nov-2024 and had been doing well until 07-Dec-2024. Patient had expressed concern that he ws discharged too early but insurance reportedly was arguing whether to cover a longer stay Patient uses a walker to ambulate at baseline. Patient noted being unsteady on his feet and that his legs gave out on him. This led to EMS being called and patient being brought to the hospital. CT head was unremarkable.\"    Writer met with pt at bedside to introduce role and assess for discharge needs r/t elevated readmission risk score. Pt reported he lives in an apartment alone and is independent with I/ADLs at baseline, although has been having increasing difficulty \"since September 2024\". Pt has had several falls where he feels he is \"suddenly on the floor\". Address and PCP verified.     Pt denied current financial, MH, or CD concerns. No HCD on file but pt stated he has one and declined to discuss further. "     Current recs are for TCU. Pt has been declined by  TCU r/t concerns that pt likely needs higher level of care at baseline and returning home is not the ideal disposition. Other referrals pending.     Writer inquired if pt has attended [facility] clinic or cardiac rehab in the past r/t his CHF. Pt stated he had not but was interested in pursuing further specialty care for his HF. Provider notified of pt interest and will consult if appropriate.     Pt not medically ready for discharge today. Care management will continue to follow.     Next Steps: Follow up on TCU referrals      Lety Schneider, RN   Nurse Coordinator    6 Med/Surg  Phone: [phone]    Social Work and Care Management Department     SEARCHABLE in [facility] - search CARE COORDINATOR   [employer] ([phone]) Saturday & Sunday; ([phone])  Recognized Holidays   Units: 6 Med Surg Vocera & 8 Med Surg Vocera Pager [phone]

## 2024-12-10 NOTE — PROGRESS NOTES
Social Work Brief Note:    Writer is familiar with this pt as writer coordinated pt previous discharge to [facility] while pt was under [facility] care. Writer briefly met with pt and RNCC Lety Schneider at bed side to discuss discharge planning. Writer explained to pt that [facility] and [facility] is unable accept the pt due to no bed available. Pt stated he enjoyed his previous stay at [facility]. Writer completed the PAS screen on this pt as it was anticipated he would trigger an OBRA level 2. Writer completed PAS ([id number]) and confirmed the PAS triggered an OBRA level 2. SW will continue to follow for discharge planning.     YAAKOV Garcia  [facility] Hospitalhailey   Coverin MS MERVAT  Ph: [phone]  [facility]  Care Management Department    Securely message me on Vocera

## 2024-12-10 NOTE — PROGRESS NOTES
CARE MANAGEMENT    CHW sent preferred TCU options of the patient.    PENDING:    [place], [place]  [phone]    [place], [place]  [phone]    [facility]  [place]  [phone]    12/10: CHW picked a list of TCU options on Medicare .gov and gave to the patient for his preferred choices,  Patient was resistant and asked why is he here@ [facility]. CHW asked the patient's nurse to ask the Provider if possible to come and address  some difficult questions the patient is very concerned about.  He complained of going to a poor quality TCU with 5 stars listed. ([facility] in [place]) He claimsma misunderstanding on why he is notwalking and decreasing his falls.  He is very reserved on choosing other options without detailed research.  Care Management will follow.    Addendum:12/10/2024@12:44PM    Patient apologize profusely for being rude as I gave him the Medicare.gov list of TCU options.  CHW accepted the patient's apology and shared empathy on what he has gone through.    Miguel Alvarez  In-Patient CHW

## 2024-12-11 ENCOUNTER — DOCUMENTATION ONLY (OUTPATIENT)
Dept: FAMILY MEDICINE | Facility: CLINIC | Age: 74
End: 2024-12-11
Payer: COMMERCIAL

## 2024-12-11 ENCOUNTER — APPOINTMENT (OUTPATIENT)
Dept: OCCUPATIONAL THERAPY | Facility: CLINIC | Age: 74
DRG: 312 | End: 2024-12-11
Payer: COMMERCIAL

## 2024-12-11 LAB
GLUCOSE BLDC GLUCOMTR-MCNC: 105 MG/DL (ref 70–99)
GLUCOSE BLDC GLUCOMTR-MCNC: 136 MG/DL (ref 70–99)
GLUCOSE BLDC GLUCOMTR-MCNC: 138 MG/DL (ref 70–99)
GLUCOSE SERPL-MCNC: 97 MG/DL (ref 70–99)
HOLD SPECIMEN: NORMAL
INR PPP: 1.61 (ref 0.85–1.15)
MAGNESIUM SERPL-MCNC: 1.6 MG/DL (ref 1.7–2.3)
PHOSPHATE SERPL-MCNC: 3.2 MG/DL (ref 2.5–4.5)
POTASSIUM SERPL-SCNC: 4.1 MMOL/L (ref 3.4–5.3)

## 2024-12-11 PROCEDURE — 97166 OT EVAL MOD COMPLEX 45 MIN: CPT | Mod: GO | Performed by: OCCUPATIONAL THERAPIST

## 2024-12-11 PROCEDURE — 250N000013 HC RX MED GY IP 250 OP 250 PS 637: Performed by: INTERNAL MEDICINE

## 2024-12-11 PROCEDURE — 84100 ASSAY OF PHOSPHORUS: CPT | Performed by: INTERNAL MEDICINE

## 2024-12-11 PROCEDURE — 250N000013 HC RX MED GY IP 250 OP 250 PS 637: Performed by: EMERGENCY MEDICINE

## 2024-12-11 PROCEDURE — 250N000013 HC RX MED GY IP 250 OP 250 PS 637

## 2024-12-11 PROCEDURE — 85610 PROTHROMBIN TIME: CPT

## 2024-12-11 PROCEDURE — 36415 COLL VENOUS BLD VENIPUNCTURE: CPT

## 2024-12-11 PROCEDURE — 84132 ASSAY OF SERUM POTASSIUM: CPT | Performed by: INTERNAL MEDICINE

## 2024-12-11 PROCEDURE — 97530 THERAPEUTIC ACTIVITIES: CPT | Mod: GO | Performed by: OCCUPATIONAL THERAPIST

## 2024-12-11 PROCEDURE — 82947 ASSAY GLUCOSE BLOOD QUANT: CPT | Performed by: INTERNAL MEDICINE

## 2024-12-11 PROCEDURE — 120N000002 HC R&B MED SURG/OB UMMC

## 2024-12-11 PROCEDURE — 99232 SBSQ HOSP IP/OBS MODERATE 35: CPT | Performed by: INTERNAL MEDICINE

## 2024-12-11 PROCEDURE — 83735 ASSAY OF MAGNESIUM: CPT | Performed by: INTERNAL MEDICINE

## 2024-12-11 RX ORDER — WARFARIN SODIUM 10 MG/1
10 TABLET ORAL
Status: COMPLETED | OUTPATIENT
Start: 2024-12-11 | End: 2024-12-11

## 2024-12-11 RX ADMIN — MAGNESIUM OXIDE TAB 400 MG (241.3 MG ELEMENTAL MG) 400 MG: 400 (241.3 MG) TAB at 08:20

## 2024-12-11 RX ADMIN — METOPROLOL SUCCINATE 100 MG: 50 TABLET, EXTENDED RELEASE ORAL at 08:19

## 2024-12-11 RX ADMIN — TORSEMIDE 60 MG: 20 TABLET ORAL at 11:51

## 2024-12-11 RX ADMIN — Medication 1 TABLET: at 08:20

## 2024-12-11 RX ADMIN — POTASSIUM CHLORIDE 40 MEQ: 1500 TABLET, EXTENDED RELEASE ORAL at 08:20

## 2024-12-11 RX ADMIN — METFORMIN HYDROCHLORIDE 500 MG: 500 TABLET, FILM COATED ORAL at 18:00

## 2024-12-11 RX ADMIN — SENNOSIDES AND DOCUSATE SODIUM 1 TABLET: 8.6; 5 TABLET ORAL at 22:06

## 2024-12-11 RX ADMIN — WARFARIN SODIUM 10 MG: 10 TABLET ORAL at 18:01

## 2024-12-11 RX ADMIN — LEVOTHYROXINE SODIUM 175 MCG: 100 TABLET ORAL at 05:31

## 2024-12-11 RX ADMIN — POTASSIUM CHLORIDE 40 MEQ: 1500 TABLET, EXTENDED RELEASE ORAL at 20:26

## 2024-12-11 RX ADMIN — CHLORHEXIDINE GLUCONATE, 0.12% ORAL RINSE 15 ML: 1.2 SOLUTION DENTAL at 08:20

## 2024-12-11 RX ADMIN — CHLORHEXIDINE GLUCONATE, 0.12% ORAL RINSE 15 ML: 1.2 SOLUTION DENTAL at 20:26

## 2024-12-11 RX ADMIN — B-COMPLEX W/ C & FOLIC ACID TAB 1 TABLET: TAB at 08:20

## 2024-12-11 RX ADMIN — OXYCODONE HYDROCHLORIDE AND ACETAMINOPHEN 1000 MG: 500 TABLET ORAL at 08:19

## 2024-12-11 RX ADMIN — ATORVASTATIN CALCIUM 40 MG: 40 TABLET, FILM COATED ORAL at 08:20

## 2024-12-11 RX ADMIN — SPIRONOLACTONE 50 MG: 25 TABLET ORAL at 08:20

## 2024-12-11 RX ADMIN — METFORMIN HYDROCHLORIDE 500 MG: 500 TABLET, FILM COATED ORAL at 08:19

## 2024-12-11 RX ADMIN — Medication 25 MCG: at 08:20

## 2024-12-11 RX ADMIN — ASPIRIN 81 MG CHEWABLE TABLET 81 MG: 81 TABLET CHEWABLE at 08:20

## 2024-12-11 ASSESSMENT — ACTIVITIES OF DAILY LIVING (ADL)
ADLS_ACUITY_SCORE: 68
ADLS_ACUITY_SCORE: 64
ADLS_ACUITY_SCORE: 68
ADLS_ACUITY_SCORE: 64
ADLS_ACUITY_SCORE: 68
ADLS_ACUITY_SCORE: 64
ADLS_ACUITY_SCORE: 68
ADLS_ACUITY_SCORE: 64
ADLS_ACUITY_SCORE: 68
ADLS_ACUITY_SCORE: 64
ADLS_ACUITY_SCORE: 68
ADLS_ACUITY_SCORE: 68
ADLS_ACUITY_SCORE: 64

## 2024-12-11 NOTE — PROGRESS NOTES
"When opening a documentation only encounter, be sure to enter in \"Chief Complaint\" Forms and in \" Comments\" Title of form, description if needed.    Angel is a 74 year old  male  Form received via: Fax  Form now resides in: Provider Ready    Order no. [id number]    Erna Schwartz MA           Form has been completed by provider.     Form sent out via: Fax to [facility]  at Fax Number:  [phone]  Patient informed: N/A  Output date: December 12, 2024    Erna Schwartz MA  "

## 2024-12-11 NOTE — PROGRESS NOTES
"When opening a documentation only encounter, be sure to enter in \"Chief Complaint\" Forms and in \" Comments\" Title of form, description if needed.    Angel is a 74 year old  male  Form received via: Fax  Form now resides in: Provider Ready    Erna Schwartz MA            Form has been completed by provider.     Form sent out via: Fax to [facility]  at Fax Number:  [phone]  Patient informed: N/A  Output date: December 12, 2024    Erna Schwartz MA     "

## 2024-12-11 NOTE — PROGRESS NOTES
"When opening a documentation only encounter, be sure to enter in \"Chief Complaint\" Forms and in \" Comments\" Title of form, description if needed.    Angel is a 74 year old  male  Form received via: Fax  Form now resides in: Provider Ready    order no. [id number]    Erna Schwartz MA            Form has been completed by provider.     Form sent out via: Fax to [facility]  at Fax Number:  [phone]  Patient informed: N/A  Output date: December 12, 2024    Erna Schwartz MA      **Please close the encounter**   "

## 2024-12-11 NOTE — PROGRESS NOTES
[facility]    Medicine Progress Note - Hospitalist Service, GOLD TEAM 19    Date of Admission:  12/7/2024    Assessment & Plan   A: Patient is a 75 y/o man who has a past medical history significant for diabetes mellitus type II, chronic kidney disease, heart failure with preserved ejection fraction, atrial fibrillation, depression, obstructive sleep apnea, chronic anemia, hypothyroidism, hyperlipidemia and BPH. Patient had been discharged to home from TCU on 25-Nov-2024 and had been doing well until 07-Dec-2024. Patient had expressed concern that he ws discharged too early but insurance reportedly was arguing whether to cover a longer stay Patient uses a walker to ambulate at baseline. Patient noted being unsteady on his feet and that his legs gave out on him. This led to EMS being called and patient being brought to the hospital. CT head was unremarkable.     Patient was found to have hypokalemia.     Patient noted having tachycardia with activity during hospital stay in 2019. Per chart review, patient did have heart rates increasing into the 150s at that time. Patient did not tolerate increased beta blockers due to hypotension. Patient's episodes of tachycardia was attributed to deconditioning.    Assessment and plan  1.) Mechanical fall:  - Monitoring for safety.  - PT/OT to see.     2.) Bilateral knee and ankle pain with movement:  - Knee and ankle x-rays were negative for fracture.  - Patient on acetaminophen as needed.     3.) Hypokalemia:  - Supplementing.     4.) Hyponatremia, resolved for now:  - Monitoring as needed.     5.) Hypophosphatemia, resolved:  - Supplementing as needed.     6.) Chronic heart failure with preserved ejection fraction:  - On 25-Oct-2024, echocardiogram showed LVEF 55-60%.  - Patient on torsemide and spironolactone.  - Monitoring for evidence of acute heart failure.     7.) Atrial fibrillation, possibly permanent:  - [id number] score  "was 5.  - Patient on coumadin for anticoagulation. INR therapeutic today.   - Patient on metoprolol for rate control.  - Patient was tachycardic with activity today. Monitoring for recurrence.      8.) Chronic hypercapnic respiratory failure; obstructive sleep apnea:  - Patient to continue CPAP.     9.) Chronic kidney disease, reportedly stage III:  - Monitoring labs as needed.     10.) Diabetes mellitus type II with neuropathy:  - On 07-Oct-2024, HbA1c was 6.2%.  - Patient usually on metformin and this is being continued.  - Monitoring blood glucose twice daily.      11.) Hyperlipidemia:  - Patient on lipitor.     12.) Chronic microcytic anemia:  - Patient on iron supplementation.     13.) Hypothyroidism:  - On 07-Oct-2024, TSH was 0.93.  - Patient on levothyroxine 175 mcg daily.     14.) Possible osteopenia on x-ray:  - Further evaluation as outpatient.     15.) BPH:  - Patient on flomax.     16.) Periodontal disease:  - During previous hospital stay, patient had been seen by dentistry - on evaluation, \"possible root tip maxillary left second molar (#15)\"  - Patient on chlorhexidine oral rinse twice daily.  - Patient should f/u with dentistry as outpatient.     17.) Major depressive disorder; generalized anxiety disorder:  - Patient not currently on medications for these.  - To f/u as outpatient.     18.) Chronic intermittent constipation:  - Bowel regimen.          Diet: Combination Diet Regular Diet Adult    Bazzi Catheter: Not present  Lines: None     Cardiac Monitoring: ACTIVE order. Indication: Tachyarrhythmias, acute (48 hours)  Code Status: No CPR- Do NOT Intubate      Clinically Significant Risk Factors             # Hypomagnesemia: Lowest Mg = 1.6 mg/dL in last 2 days, will replace as needed   # Hypoalbuminemia: Lowest albumin = 3.1 g/dL at 12/8/2024  6:52 AM, will monitor as appropriate     # Hypertension: Noted on problem list    # Chronic heart failure with preserved ejection fraction: heart failure " noted on problem list and last echo with EF >50%           # Severe Obesity: Estimated body mass index is 46.55 kg/m  as calculated from the following:    Height as of this encounter: 1.829 m (6').    Weight as of this encounter: 155.7 kg (343 lb 4.1 oz)., PRESENT ON ADMISSION       # Financial/Environmental Concerns: none         Social Drivers of Health    Housing Stability: Low Risk  (12/8/2024)    Housing Stability     Do you have housing? : Yes     Are you worried about losing your housing?: No   Recent Concern: Housing Stability - High Risk (9/25/2024)    Housing Stability     Do you have housing? : No     Are you worried about losing your housing?: No          Moiz Dial MD  Hospitalist Service, GOLD TEAM 19  [facility]  Securely message with [employer] (more info)  Text page via [employer] Paging/Directory   See signed in provider for up to date coverage information  ______________________________________________________________________    Interval History   Patient reports increased edema of the legs as he was not wearing compression stockings for a couple of days, he is not happy about plan to discharge to TCU stating that no body is able to tell him why he keeps falling down frequently. I told him, could due to generalized weakness or orthostatic hypotension    Patient reportedly was tachycardic with PT. Patient noted no associated symptoms - patient noted no palpitations and no lightheadedness. Denies feeling dizzy lying down or during PT sessions.     Patient noted having tachycardia with activity during hospital stay in 2019. Per chart review, patient did have heart rates increasing into the 150s at that time. Patient did not tolerate increased beta blockers due to hypotension. Patient's episodes of tachycardia was attributed to deconditioning.     Physical Exam   Vital Signs: Temp: 97.7  F (36.5  C) Temp src: Oral BP: 115/57 Pulse: 86   Resp: 18 SpO2: 98 % O2  Device: None (Room air)    Weight: 343 lbs 4.1 oz    Labs  CBC RESULTS:   Recent Labs   Lab Test 12/08/24  0652   WBC 7.5   RBC 4.52   HGB 11.3*   HCT 35.6*   MCV 79   MCH 25.0*   MCHC 31.7   RDW 18.8*        Last Comprehensive Metabolic Panel:  Lab Results   Component Value Date     12/10/2024    POTASSIUM 4.1 12/11/2024    CHLORIDE 102 12/10/2024    CO2 31 (H) 12/10/2024    ANIONGAP 8 12/10/2024     (H) 12/11/2024    BUN 10.8 12/10/2024    CR 1.01 12/10/2024    GFRESTIMATED 78 12/10/2024    CHERI 9.0 12/10/2024           Medical Decision Making   Total time spent is 38 minutes       Care discussed with Rn and case  manager during rounds

## 2024-12-11 NOTE — PLAN OF CARE
Shift 12/10 2300 - 12/11 0700      VS: VSS - Afib rhythm.   O2: Saturating WDL on Room Air   Output: Voiding with urinal   Last BM: 12/9   Activity: 2 assist with gait belt and walker   Skin: Scattered bruises/scabs   Pain: LE neuropathy, reviewed pain mgmt plan with pt but patient denies needing to take anything for pain   CMS: Baseline neuropathy, otherwise intact   Diet: Regular   LDA: L PIV   Additional Info: Pt heart rate spikes briefly to 150s with activity but is not sustained and resolves spontaneously.     Plan: Pt still working with social work on TCU placement. Pending TCU placement.    Joey Campos RN on 12/11/2024 at 6:44 AM      Goal Outcome Evaluation:      Plan of Care Reviewed With: patient    Overall Patient Progress: improvingOverall Patient Progress: improving    Outcome Evaluation: Pt still working with social work/care coordination on TCU placement.      Problem: Fall Injury Risk  Goal: Absence of Fall and Fall-Related Injury  Intervention: Identify and Manage Contributors  Recent Flowsheet Documentation  Taken 12/11/2024 0000 by Joey Campos, RN  Medication Review/Management: medications reviewed

## 2024-12-11 NOTE — PLAN OF CARE
Problem: Adult Inpatient Plan of Care  Goal: Absence of Hospital-Acquired Illness or Injury  Intervention: Prevent Infection  Recent Flowsheet Documentation  Taken 12/11/2024 0918 by Dre Alexandra RN  Infection Prevention:   equipment surfaces disinfected   hand hygiene promoted     Problem: Comorbidity Management  Goal: Blood Glucose Levels Within Targeted Range  Outcome: Progressing  Goal: Maintenance of Heart Failure Symptom Control  Outcome: Progressing  Goal: Blood Pressure in Desired Range  Outcome: Progressing             VS:  Temp: 97.7  F (36.5  C) Temp src: Oral BP: 115/57 Pulse: 86   Resp: 18 SpO2: 98 % O2 Device: None (Room air)     O2: SpO2 > 98 and stable on RA. Diminished LS    Output: Voids spontaneously without difficulty to urinal   Last BM: 12/10/2024, denies abdominal discomfort. BS active / passing flatus.    Activity: SBA / Ax3, walker and gait belt   Skin:  tenderness on bilateral knees and left shoulder   Scattered scabs, scattered bruises   Scattered bruises on bilaeral lower ex  R pit redness  Bilateral knees tenderness  L shoulder tenderness    CMS: Intact, AOx4.    Dressing:  None    Diet: Combination Regular diet. Denies nausea/vomiting.    Pre breakfast 138   LDA: L PIV SL    Equipment: IV pole, personal belongings,    Plan: standard precautions maintained / Continue with plan of care. Call light within reach, pt able to make needs known.  Plan: therapy, electrolytes     Additional Info:  On RN managed electrolytes

## 2024-12-11 NOTE — PROGRESS NOTES
"   12/11/24 1426   Appointment Info   Signing Clinician's Name / Credentials (OT) Ellie Young OTR/L   Living Environment   People in Home alone   Current Living Arrangements apartment   Home Accessibility no concerns  (elevator access)   Transportation Anticipated health plan transportation   Self-Care   Usual Activity Tolerance moderate   Equipment Currently Used at Home walker, rolling;hospital bed;wheelchair, manual;cane, straight;shower chair   Fall history within last six months yes   Number of times patient has fallen within last six months 4   Activity/Exercise/Self-Care Comment Patient reports previous IND/mod I with mobility and some ADLs. Reports that he does not bath due to not feeling confident with getting in/out of tub shower. Does not do laundry. Reports he was discharged from TCU \"too soon\", has been struggling at home. Multiple falls at home, reports limited social support.   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[employer], with some supplemenation; has not done laundry since dc from u   General Information   Onset of Illness/Injury or Date of Surgery 12/07/24   Referring Physician Cr Escobedo PA-C   Patient/Family Therapy Goal Statement (OT) get stronger, back to where he was   Additional Occupational Profile Info/Pertinent History of Current Problem per chart: Patient is a 75 y/o man who has a past medical history significant for diabetes mellitus type II, chronic kidney disease, heart failure with preserved ejection fraction, atrial fibrillation, depression, obstructive sleep apnea, chronic anemia, hypothyroidism, hyperlipidemia and BPH. Patient had been discharged to home from TCU on 25-Nov-2024 and had been doing well until 07-Dec-2024. Patient had expressed concern that he ws discharged too early but insurance reportedly was arguing whether to cover a " longer stay Patient uses a walker to ambulate at baseline. Patient noted being unsteady on his feet and that his legs gave out on him. This led to EMS being called and patient being brought to the hospital. CT head was unremarkable.     Patient was found to have hypokalemia.   Existing Precautions/Restrictions fall   Left Upper Extremity (Weight-bearing Status) full weight-bearing (FWB)   Right Upper Extremity (Weight-bearing Status) full weight-bearing (FWB)   Left Lower Extremity (Weight-bearing Status) full weight-bearing (FWB)   Right Lower Extremity (Weight-bearing Status) full weight-bearing (FWB)   General Observations and Info activity order: up with assist prn   Cognitive Status Examination   Orientation Status orientation to person, place and time   Affect/Mental Status (Cognitive) WFL   Follows Commands follows two-step commands;over 90% accuracy   Pain Assessment   Patient Currently in Pain Yes, see Vital Sign flowsheet   Posture   Posture forward head position;protracted shoulders   Range of Motion Comprehensive   Comment, General Range of Motion WFL   Strength Comprehensive (MMT)   Comment, General Manual Muscle Testing (MMT) Assessment pt demonstrates difficulty with antigravity strength BLE, able to complete BUE; decreased functional activity tolerance; generalized weakness   Coordination   Upper Extremity Coordination No deficits were identified   Bed Mobility   Bed Mobility supine-sit;sit-supine   Supine-Sit [place] (Bed Mobility) minimum assist (75% patient effort)   Transfers   Transfers sit-stand transfer;toilet transfer   Sit-Stand Transfer   Sit-Stand [place] (Transfers) minimum assist (75% patient effort)   Assistive Device (Sit-Stand Transfers) walker, front-wheeled   Toilet Transfer   [place] Level (Toilet Transfer) moderate assist (50% patient effort)   Toilet Transfer Comments per clinical judgement   Balance   Balance Comments good seated; unsteady standing/walking w/2WW    Activities of Daily Living   BADL Assessment/Intervention bathing;upper body dressing;lower body dressing;grooming;toileting   Bathing Assessment/Intervention   [place] Level (Bathing) moderate assist (50% patient effort)   Upper Body Dressing Assessment/Training   [place] Level (Upper Body Dressing) minimum assist (75% patient effort)   Lower Body Dressing Assessment/Training   [place] Level (Lower Body Dressing) maximum assist (25% patient effort)   Grooming Assessment/Training   [place] Level (Grooming) minimum assist (75% patient effort)   Toileting   [place] Level (Toileting) moderate assist (50% patient effort)   Clinical Impression   Criteria for Skilled Therapeutic Interventions Met (OT) Yes, treatment indicated   OT Diagnosis impaired ADLs   OT Problem List-Impairments impacting ADL problems related to;activity tolerance impaired;balance;mobility;strength;pain   Assessment of Occupational Performance 3-5 Performance Deficits   Identified Performance Deficits dressing, toilet transfer, toileting, grooming/hygiene, home chores   Planned Therapy Interventions (OT) ADL retraining;IADL retraining;strengthening;transfer training;home program guidelines;progressive activity/exercise   Clinical Decision Making Complexity (OT) detailed assessment/moderate complexity   Risk & Benefits of therapy have been explained evaluation/treatment results reviewed;patient   OT Total Evaluation Time   OT Eval, Moderate Complexity Minutes (12027) 10   OT Goals   Therapy Frequency (OT) 5 times/week   OT Predicted Duration/Target Date for Goal Attainment 12/25/24   OT Goals Hygiene/Grooming;Upper Body Dressing;Lower Body Dressing;Toilet Transfer/Toileting;Home Management   OT: Hygiene/Grooming modified independent;while standing   OT: Upper Body Dressing Modified independent;including set-up/clothing retrieval   OT: Lower Body Dressing Modified independent;using adaptive equipment;including set-up/clothing  "retrieval   OT: Toilet Transfer/Toileting Modified independent;toilet transfer;cleaning and garment management;using adaptive equipment   OT: Home Management Supervision/stand-by assist;with light demand household tasks;ambulatory level   Therapeutic Activities   Therapeutic Activity Minutes (41043) 45   Symptoms noted during/after treatment fatigue;significant change in vital signs   Treatment Detail/Skilled Intervention Pt greeted seated in bed, agreeable to activity.  Pt uses urinal indep prior to activity. Pt cued for supine > sit, CGA with increased effort, pt using bedrail and using BUE to assist with RLE.  Increased time/effort to EOB. HR monitored t/o room activity,  while seated in bed, and 106-174 standing EOB.  Pt cued for PLB, and it seemed to assist randomly. RN called, who eventually states \"take it off\", and proceeds to remove tele and monitor for therapy.  Pt asymptomatic t/o session. Sravanthi to don robe. Pt walks ~45' + 25' + 10'x2, cued for standing rest breaks.  Heavy use of BUE on walker for gait, pt with increased BLE tremor as he fatigued towards end of mobility. Pt left seated in recliner, call light in reach, pt appreciative of session.  HR in 90s at TH exit.   OT Discharge Planning   OT Plan dressing, toilet transfer, toileting, g/h standing at sink   OT Discharge Recommendation (DC Rec) Transitional Care Facility   OT Rationale for DC Rec Pt below baseline, limited by weakness, pain.  He would benefit from continued therapy to maximize safety and independence with I/ADLs.   OT Brief overview of current status Ax1 with 2ww, tachy up to 170s   Total Session Time   Timed Code Treatment Minutes 45   Total Session Time (sum of timed and untimed services) 55     "

## 2024-12-11 NOTE — PROGRESS NOTES
Care Management Follow Up    Length of Stay (days): 4    Expected Discharge Date: 2024     Concerns to be Addressed: discharge planning     Patient plan of care discussed at interdisciplinary rounds: Yes    Anticipated Discharge Disposition: Transitional Care              Anticipated Discharge Services:    Anticipated Discharge DME: None    Patient/family educated on Medicare website which has current facility and service quality ratings: yes  Education Provided on the Discharge Plan: Yes  Patient/Family in Agreement with the Plan: yes    Referrals Placed by CM/SW:    Private pay costs discussed: Not applicable    Discussed  Partnership in Safe Discharge Planning  document with patient/family: No     Handoff Completed: No, handoff not indicated or clinically appropriate    Additional Information:  Writer received a call from [facility] regarding this pt. The OBRA level 2 screener requested a copy of this pt H&P and any Psych notes from this admission be faxed to her. Writer faxed the requested information to [phone]. The direct desk phone for the OBRA level 2  is [phone].     Next Steps: Follow for discharge planning      YAAKOV Garcia   Coverin MS MERVAT  Ph: [phone]  [facility]  Care Management Department    Securely message me on Vocera

## 2024-12-11 NOTE — PLAN OF CARE
Goal Outcome Evaluation:      Plan of Care Reviewed With: patient    Overall Patient Progress: improvingOverall Patient Progress: improving     [id number]  VS: VSS; HR briefly went up to 140 when pt using urinal. Asymptomatic; resolved spontaneously   O2: >90% on RA   Output: Voiding adequately   Last BM: 12/9; +fl   Activity: WBAT; 2A with GB/FWW   Up for meals? NA   Skin: Scattered scabs, dry heels   Pain: Minimal; declined tylenol at this time   CMS: Baseline neuropathy   Dressing: None   Diet: Regular   LDA: L PIV SL   Plan: Likely will need TCU; SW/CC involved   Additional Info:

## 2024-12-11 NOTE — PROGRESS NOTES
CARE MANAGEMENT    CHW sent preferred TCU options of the patient.    PENDING:    [place], [place]  [phone]    [place], [place]  [phone]    [facility]  [place]  [phone]  12/11: CHW spoke with the patient about sending more referrals. He does not want other referrals sent. He is talking to the Provider to get an understanding of his falls,   He asked his Provider to do further assessment ssurrounding his BP and heart issues.   He wants more updated information from PT/OT before he decides on his next steps going forward on his preferred referrals.  12/10: CHW picked a list of TCU options on Medicare .gov and gave to the patient for his preferred choices,  Patient was resistant and asked why is he here@ [facility]. CHW asked the patient's nurse to ask the Provider if possible to come and address  some difficult questions the patient is very concerned about.  He complained of going to a poor quality TCU with 5 stars listed. ([facility] in [place]) He claimsma misunderstanding on why he is notwalking and decreasing his falls.  He is very reserved on choosing other options without detailed research.  Care Management will follow.    Addendum:12/10/2024@12:44PM    Patient apologize profusely for being rude as I gave him the Medicare.gov list of TCU options.  CHW accepted the patient's apology and shared empathy on what he has gone through.    Miguel Alvarez  In-Patient CHW

## 2024-12-12 ENCOUNTER — APPOINTMENT (OUTPATIENT)
Dept: OCCUPATIONAL THERAPY | Facility: CLINIC | Age: 74
DRG: 312 | End: 2024-12-12
Payer: COMMERCIAL

## 2024-12-12 ENCOUNTER — APPOINTMENT (OUTPATIENT)
Dept: PHYSICAL THERAPY | Facility: CLINIC | Age: 74
DRG: 312 | End: 2024-12-12
Payer: COMMERCIAL

## 2024-12-12 VITALS
DIASTOLIC BLOOD PRESSURE: 63 MMHG | TEMPERATURE: 97.6 F | SYSTOLIC BLOOD PRESSURE: 114 MMHG | OXYGEN SATURATION: 96 % | HEIGHT: 72 IN | WEIGHT: 315 LBS | BODY MASS INDEX: 42.66 KG/M2 | HEART RATE: 89 BPM | RESPIRATION RATE: 18 BRPM

## 2024-12-12 DIAGNOSIS — Z53.9 DIAGNOSIS NOT YET DEFINED: Primary | ICD-10-CM

## 2024-12-12 LAB
GLUCOSE BLDC GLUCOMTR-MCNC: 118 MG/DL (ref 70–99)
GLUCOSE BLDC GLUCOMTR-MCNC: 119 MG/DL (ref 70–99)
GLUCOSE BLDC GLUCOMTR-MCNC: 176 MG/DL (ref 70–99)
INR PPP: 1.51 (ref 0.85–1.15)
MAGNESIUM SERPL-MCNC: 1.8 MG/DL (ref 1.7–2.3)
PHOSPHATE SERPL-MCNC: 3.6 MG/DL (ref 2.5–4.5)
POTASSIUM SERPL-SCNC: 4.3 MMOL/L (ref 3.4–5.3)

## 2024-12-12 PROCEDURE — 250N000013 HC RX MED GY IP 250 OP 250 PS 637

## 2024-12-12 PROCEDURE — 85610 PROTHROMBIN TIME: CPT

## 2024-12-12 PROCEDURE — 250N000013 HC RX MED GY IP 250 OP 250 PS 637: Performed by: INTERNAL MEDICINE

## 2024-12-12 PROCEDURE — 120N000002 HC R&B MED SURG/OB UMMC

## 2024-12-12 PROCEDURE — 97530 THERAPEUTIC ACTIVITIES: CPT | Mod: GP | Performed by: PHYSICAL THERAPIST

## 2024-12-12 PROCEDURE — 36415 COLL VENOUS BLD VENIPUNCTURE: CPT | Performed by: INTERNAL MEDICINE

## 2024-12-12 PROCEDURE — 250N000013 HC RX MED GY IP 250 OP 250 PS 637: Performed by: EMERGENCY MEDICINE

## 2024-12-12 PROCEDURE — 84132 ASSAY OF SERUM POTASSIUM: CPT | Performed by: INTERNAL MEDICINE

## 2024-12-12 PROCEDURE — 84100 ASSAY OF PHOSPHORUS: CPT | Performed by: INTERNAL MEDICINE

## 2024-12-12 PROCEDURE — 99232 SBSQ HOSP IP/OBS MODERATE 35: CPT | Performed by: INTERNAL MEDICINE

## 2024-12-12 PROCEDURE — 83735 ASSAY OF MAGNESIUM: CPT | Performed by: INTERNAL MEDICINE

## 2024-12-12 PROCEDURE — 97535 SELF CARE MNGMENT TRAINING: CPT | Mod: GO

## 2024-12-12 PROCEDURE — G0180 MD CERTIFICATION HHA PATIENT: HCPCS | Performed by: FAMILY MEDICINE

## 2024-12-12 RX ORDER — WARFARIN SODIUM 10 MG/1
10 TABLET ORAL
Status: COMPLETED | OUTPATIENT
Start: 2024-12-12 | End: 2024-12-12

## 2024-12-12 RX ADMIN — WARFARIN SODIUM 10 MG: 10 TABLET ORAL at 17:50

## 2024-12-12 RX ADMIN — Medication 25 MCG: at 08:20

## 2024-12-12 RX ADMIN — SPIRONOLACTONE 50 MG: 25 TABLET ORAL at 08:20

## 2024-12-12 RX ADMIN — ASPIRIN 81 MG CHEWABLE TABLET 81 MG: 81 TABLET CHEWABLE at 08:19

## 2024-12-12 RX ADMIN — METFORMIN HYDROCHLORIDE 500 MG: 500 TABLET, FILM COATED ORAL at 17:44

## 2024-12-12 RX ADMIN — FERROUS SULFATE TAB 325 MG (65 MG ELEMENTAL FE) 325 MG: 325 (65 FE) TAB at 08:20

## 2024-12-12 RX ADMIN — B-COMPLEX W/ C & FOLIC ACID TAB 1 TABLET: TAB at 08:19

## 2024-12-12 RX ADMIN — METFORMIN HYDROCHLORIDE 500 MG: 500 TABLET, FILM COATED ORAL at 08:19

## 2024-12-12 RX ADMIN — CHLORHEXIDINE GLUCONATE, 0.12% ORAL RINSE 15 ML: 1.2 SOLUTION DENTAL at 21:53

## 2024-12-12 RX ADMIN — TORSEMIDE 60 MG: 20 TABLET ORAL at 12:21

## 2024-12-12 RX ADMIN — MAGNESIUM OXIDE TAB 400 MG (241.3 MG ELEMENTAL MG) 400 MG: 400 (241.3 MG) TAB at 08:20

## 2024-12-12 RX ADMIN — Medication 1 TABLET: at 08:19

## 2024-12-12 RX ADMIN — CHLORHEXIDINE GLUCONATE, 0.12% ORAL RINSE 15 ML: 1.2 SOLUTION DENTAL at 08:19

## 2024-12-12 RX ADMIN — POTASSIUM CHLORIDE 40 MEQ: 1500 TABLET, EXTENDED RELEASE ORAL at 08:19

## 2024-12-12 RX ADMIN — METOPROLOL SUCCINATE 100 MG: 50 TABLET, EXTENDED RELEASE ORAL at 08:20

## 2024-12-12 RX ADMIN — OXYCODONE HYDROCHLORIDE AND ACETAMINOPHEN 1000 MG: 500 TABLET ORAL at 08:20

## 2024-12-12 RX ADMIN — POTASSIUM CHLORIDE 40 MEQ: 1500 TABLET, EXTENDED RELEASE ORAL at 21:53

## 2024-12-12 RX ADMIN — ATORVASTATIN CALCIUM 40 MG: 40 TABLET, FILM COATED ORAL at 08:19

## 2024-12-12 RX ADMIN — LEVOTHYROXINE SODIUM 175 MCG: 100 TABLET ORAL at 06:22

## 2024-12-12 ASSESSMENT — ACTIVITIES OF DAILY LIVING (ADL)
ADLS_ACUITY_SCORE: 64
ADLS_ACUITY_SCORE: 65
ADLS_ACUITY_SCORE: 64
ADLS_ACUITY_SCORE: 65
ADLS_ACUITY_SCORE: 65
ADLS_ACUITY_SCORE: 69
ADLS_ACUITY_SCORE: 65
ADLS_ACUITY_SCORE: 64
ADLS_ACUITY_SCORE: 65
ADLS_ACUITY_SCORE: 64
ADLS_ACUITY_SCORE: 64
ADLS_ACUITY_SCORE: 65
ADLS_ACUITY_SCORE: 64

## 2024-12-12 NOTE — PROGRESS NOTES
CARE MANAGEMENT    CHW sent preferred TCU options of the patient.    PENDING:    [facility] [place]   [phone]    [facility]  [place], [place]  [phone]    [place], [place]  [phone]    [place] [address]  [phone]    [place], [place]  [phone]    [place], [place]  [phone]    [facility]  [place]  [phone]  12/11: CHW spoke with the patient about sending more referrals. He does not want other referrals sent. He is talking to the Provider to get an understanding of his falls,   He asked his Provider to do further assessment ssurrounding his BP and heart issues.   He wants more updated information from PT/OT before he decides on his next steps going forward on his preferred referrals.  12/10: CHW picked a list of TCU options on Medicare .gov and gave to the patient for his preferred choices,  Patient was resistant and asked why is he here@ [facility]. CHW asked the patient's nurse to ask the Provider if possible to come and address  some difficult questions the patient is very concerned about.  He complained of going to a poor quality TCU with 5 stars listed. ([facility] in [place]) He claimsma misunderstanding on why he is notwalking and decreasing his falls.  He is very reserved on choosing other options without detailed research.  Care Management will follow.    Addendum:12/10/2024@12:44PM    Patient apologize profusely for being rude as I gave him the Medicare.gov list of TCU options.  CHW accepted the patient's apology and shared empathy on what he has gone through.    Miguel Alvarez  In-Patient CHW

## 2024-12-12 NOTE — PLAN OF CARE
"Goal Outcome Evaluation:      Plan of Care Reviewed With: patient    Overall Patient Progress: no change    Outcome Evaluation: pt did not want pain med this shift. he said he was fine and he would ask for when it is needed. pt stable w/o acute change this shift.      Problem: Adult Inpatient Plan of Care  Goal: Plan of Care Review  Description: The Plan of Care Review/Shift note should be completed every shift.  The Outcome Evaluation is a brief statement about your assessment that the patient is improving, declining, or no change.  This information will be displayed automatically on your shift  note.  Outcome: Progressing  Flowsheets (Taken 12/12/2024 0557)  Outcome Evaluation: pt did not want pain med this shift. he said he was fine and he would ask for when it is needed. pt stable w/o acute change this shift.  Plan of Care Reviewed With: patient  Overall Patient Progress: no change  Goal: Patient-Specific Goal (Individualized)  Description: You can add care plan individualizations to a care plan. Examples of Individualization might be:  \"Parent requests to be called daily at 9am for status\", \"I have a hard time hearing out of my right ear\", or \"Do not touch me to wake me up as it startles  me\".  Outcome: Progressing  Goal: Absence of Hospital-Acquired Illness or Injury  Outcome: Progressing  Intervention: Identify and Manage Fall Risk  Recent Flowsheet Documentation  Taken 12/12/2024 0500 by Janice Phan RN  Safety Promotion/Fall Prevention:   activity supervised   assistive device/personal items within reach   clutter free environment maintained   lighting adjusted   nonskid shoes/slippers when out of bed   room near nurse's station   safety round/check completed  Taken 12/11/2024 2031 by Janice Phan RN  Safety Promotion/Fall Prevention:   activity supervised   assistive device/personal items within reach   clutter free environment maintained   lighting adjusted   nonskid shoes/slippers when out of " bed   room near nurse's station   safety round/check completed  Intervention: Prevent Skin Injury  Recent Flowsheet Documentation  Taken 12/12/2024 0500 by Janice Phan RN  Body Position:   position changed independently   supine, head elevated  Taken 12/11/2024 2031 by Janice Phan RN  Body Position:   position changed independently   supine, head elevated  Intervention: Prevent and Manage VTE (Venous Thromboembolism) Risk  Recent Flowsheet Documentation  Taken 12/12/2024 0500 by Janice Phan RN  VTE Prevention/Management: compression stockings on  Intervention: Prevent Infection  Recent Flowsheet Documentation  Taken 12/12/2024 0500 by Janice Phan RN  Infection Prevention:   hand hygiene promoted   single patient room provided  Taken 12/11/2024 2031 by Janice Phan RN  Infection Prevention:   hand hygiene promoted   single patient room provided  Goal: Optimal Comfort and Wellbeing  Outcome: Progressing  Goal: Readiness for Transition of Care  Outcome: Progressing     Problem: Fall Injury Risk  Goal: Absence of Fall and Fall-Related Injury  Outcome: Progressing  Intervention: Identify and Manage Contributors  Recent Flowsheet Documentation  Taken 12/12/2024 0500 by Janice Phan RN  Medication Review/Management: medications reviewed  Taken 12/11/2024 2031 by Janice Phan RN  Medication Review/Management: medications reviewed  Intervention: Promote Injury-Free Environment  Recent Flowsheet Documentation  Taken 12/12/2024 0500 by Janice Phan RN  Safety Promotion/Fall Prevention:   activity supervised   assistive device/personal items within reach   clutter free environment maintained   lighting adjusted   nonskid shoes/slippers when out of bed   room near nurse's station   safety round/check completed  Taken 12/11/2024 2031 by Janice Phan RN  Safety Promotion/Fall Prevention:   activity supervised   assistive device/personal items within reach   clutter free environment  maintained   lighting adjusted   nonskid shoes/slippers when out of bed   room near nurse's station   safety round/check completed     Problem: Comorbidity Management  Goal: Blood Glucose Levels Within Targeted Range  Outcome: Progressing  Intervention: Monitor and Manage Glycemia  Recent Flowsheet Documentation  Taken 12/12/2024 0500 by Janice Phan RN  Medication Review/Management: medications reviewed  Taken 12/11/2024 2031 by Janice Phan RN  Medication Review/Management: medications reviewed  Goal: Maintenance of Heart Failure Symptom Control  Outcome: Progressing  Intervention: Maintain Heart Failure Management  Recent Flowsheet Documentation  Taken 12/12/2024 0500 by Janice Phan RN  Medication Review/Management: medications reviewed  Taken 12/11/2024 2031 by Janice Phan RN  Medication Review/Management: medications reviewed  Goal: Blood Pressure in Desired Range  Outcome: Progressing  Intervention: Maintain Blood Pressure Management  Recent Flowsheet Documentation  Taken 12/12/2024 0500 by Janice Phan RN  Medication Review/Management: medications reviewed  Taken 12/11/2024 2031 by Janice Phan RN  Medication Review/Management: medications reviewed  Goal: Bariatric Home Regimen Maintained  Outcome: Progressing  Intervention: Maintain and Manage Postbariatric Surgery Care  Recent Flowsheet Documentation  Taken 12/12/2024 0500 by Janice Phan RN  Medication Review/Management: medications reviewed  Taken 12/11/2024 2031 by Janice Phan RN  Medication Review/Management: medications reviewed

## 2024-12-12 NOTE — PROGRESS NOTES
"CLINICAL NUTRITION SERVICES - BRIEF NOTE   (See RD note on 12/10 for full assessment)     Late entry from 12/11.     Interventions:  Adding 1 Ensure Max (van/rossy) sent w/ lunch tray as not consistently meet minimal protein goal of 100 g/d.     Future/Additional Recommendations:     Nutrition will continue to follow per protocol.    Mi Heard RD, LD   6 & 8 Med/Surg RD  Mon-Fri Vocera: \"6 Med Surg Clinical Dietitian\" or \"8 Med Surg Clinical Dietitian\"  Weekend RD Vocera (Global): \"Weekend Holiday Clinical Dietitian\"        "

## 2024-12-12 NOTE — PROGRESS NOTES
[facility]    Medicine Progress Note - Hospitalist Service, GOLD TEAM 19    Date of Admission:  12/7/2024    Assessment & Plan   A: Patient is a 75 y/o man who has a past medical history significant for diabetes mellitus type II, chronic kidney disease, heart failure with preserved ejection fraction, atrial fibrillation, depression, obstructive sleep apnea, chronic anemia, hypothyroidism, hyperlipidemia and BPH. Patient had been discharged to home from TCU on 25-Nov-2024 and had been doing well until 07-Dec-2024. Patient had expressed concern that he ws discharged too early but insurance reportedly was arguing whether to cover a longer stay Patient uses a walker to ambulate at baseline. Patient noted being unsteady on his feet and that his legs gave out on him. This led to EMS being called and patient being brought to the hospital. CT head was unremarkable.     Patient was found to have hypokalemia.     Patient noted having tachycardia with activity during hospital stay in 2019. Per chart review, patient did have heart rates increasing into the 150s at that time. Patient did not tolerate increased beta blockers due to hypotension. Patient's episodes of tachycardia was attributed to deconditioning.    Assessment and plan  1.) Mechanical fall: Etiology is unclear.  Suspect could be due to postural hypotension and tachycardia which could be due to adverse effects of medications.  Tamsulosin is on hold currently  - Monitoring for safety.  - PT/OT to see.     2.) Bilateral knee and ankle pain with movement:  - Knee and ankle x-rays were negative for fracture.  - Patient on acetaminophen as needed.     3.) Hypokalemia:  - Supplementing.     4.) Hyponatremia, resolved for now:  - Monitoring as needed.     5.) Hypophosphatemia, resolved:  - Supplementing as needed.     6.) Chronic heart failure with preserved ejection fraction:  - On 25-Oct-2024, echocardiogram showed LVEF  "55-60%.  - Patient on torsemide and spironolactone.  - Monitoring for evidence of acute heart failure.     7.) Atrial fibrillation, possibly permanent:  - [id number] score was 5.  - Patient on coumadin for anticoagulation. INR therapeutic today.   - Patient on metoprolol for rate control.  - Patient was tachycardic with activity today. Monitoring for recurrence.      8.) Chronic hypercapnic respiratory failure; obstructive sleep apnea:  - Patient to continue CPAP.     9.) Chronic kidney disease, reportedly stage III:  - Monitoring labs as needed.     10.) Diabetes mellitus type II with neuropathy:  - On 07-Oct-2024, HbA1c was 6.2%.  - Patient usually on metformin and this is being continued.  - Monitoring blood glucose twice daily.      11.) Hyperlipidemia:  - Patient on lipitor.     12.) Chronic microcytic anemia:  - Patient on iron supplementation.     13.) Hypothyroidism:  - On 07-Oct-2024, TSH was 0.93.  - Patient on levothyroxine 175 mcg daily.     14.) Possible osteopenia on x-ray:  - Further evaluation as outpatient.     15.) BPH:  - Patient on flomax.     16.) Periodontal disease:  - During previous hospital stay, patient had been seen by dentistry - on evaluation, \"possible root tip maxillary left second molar (#15)\"  - Patient on chlorhexidine oral rinse twice daily.  - Patient should f/u with dentistry as outpatient.     17.) Major depressive disorder; generalized anxiety disorder:  - Patient not currently on medications for these.  - To f/u as outpatient.     18.) Chronic intermittent constipation:  - Bowel regimen.  19).  Recurrent falls, generalized weakness.  Tolerance of activity, tachycardia with minimal activity during PT sessions recurrent falls and near syncopal episodes could be secondary to tamsulosin which is currently on hold.          Diet: Combination Diet Regular Diet Adult  Snacks/Supplements Adult: Ensure Max Protein (bariatric); With Meals    Bazzi Catheter: Not present  Lines: None   "   Cardiac Monitoring: None  Code Status: No CPR- Do NOT Intubate      Clinically Significant Risk Factors             # Hypomagnesemia: Lowest Mg = 1.6 mg/dL in last 2 days, will replace as needed   # Hypoalbuminemia: Lowest albumin = 3.1 g/dL at 12/8/2024  6:52 AM, will monitor as appropriate     # Hypertension: Noted on problem list    # Chronic heart failure with preserved ejection fraction: heart failure noted on problem list and last echo with EF >50%           # Severe Obesity: Estimated body mass index is 47.06 kg/m  as calculated from the following:    Height as of this encounter: 1.829 m (6').    Weight as of this encounter: 157.4 kg (347 lb 0.1 oz).        # Financial/Environmental Concerns: none         Social Drivers of Health    Housing Stability: Low Risk  (12/8/2024)    Housing Stability     Do you have housing? : Yes     Are you worried about losing your housing?: No   Recent Concern: Housing Stability - High Risk (9/25/2024)    Housing Stability     Do you have housing? : No     Are you worried about losing your housing?: No          Moiz Dial MD  Hospitalist Service, GOLD TEAM 19  [facility]  Securely message with [employer] (more info)  Text page via [facility] Paging/Directory   See signed in provider for up to date coverage information  ______________________________________________________________________    Interval History   Patient reports increased edema of the legs as he was not wearing compression stockings for a couple of days, he is not happy about plan to discharge to TCU stating that no body is able to tell him why he keeps falling down frequently. I told him, could due to generalized weakness or orthostatic hypotension    Patient reportedly was tachycardic with PT. Patient noted no associated symptoms - patient noted no palpitations and no lightheadedness. Denies feeling dizzy lying down or during PT sessions.     Patient noted having  tachycardia with activity during hospital stay in 2019. Per chart review, patient did have heart rates increasing into the 150s at that time. Patient did not tolerate increased beta blockers due to hypotension. Patient's episodes of tachycardia was attributed to deconditioning.     Physical Exam   Vital Signs: Temp: 98.6  F (37  C) Temp src: Oral BP: 123/55 Pulse: 98   Resp: 18 SpO2: 96 % O2 Device: None (Room air)    Weight: 347 lbs .06 oz    Labs  CBC RESULTS:   Recent Labs   Last Comprehensive Metabolic Panel:  Lab Results   Component Value Date     12/10/2024    POTASSIUM 4.3 12/12/2024    CHLORIDE 102 12/10/2024    CO2 31 (H) 12/10/2024    ANIONGAP 8 12/10/2024     (H) 12/12/2024    BUN 10.8 12/10/2024    CR 1.01 12/10/2024    GFRESTIMATED 78 12/10/2024    CHERI 9.0 12/10/2024       CBC RESULTS:   Recent Labs   Lab Test 12/08/24  0652   WBC 7.5   RBC 4.52   HGB 11.3*   HCT 35.6*   MCV 79   MCH 25.0*   MCHC 31.7   RDW 18.8*              Medical Decision Making   Total time spent is 38 minutes       Care discussed with Rn and case  manager during rounds

## 2024-12-13 LAB
GLUCOSE BLDC GLUCOMTR-MCNC: 102 MG/DL (ref 70–99)
GLUCOSE BLDC GLUCOMTR-MCNC: 138 MG/DL (ref 70–99)
GLUCOSE BLDC GLUCOMTR-MCNC: 138 MG/DL (ref 70–99)
HOLD SPECIMEN: NORMAL
INR PPP: 1.53 (ref 0.85–1.15)
MAGNESIUM SERPL-MCNC: 1.8 MG/DL (ref 1.7–2.3)
PHOSPHATE SERPL-MCNC: 3.4 MG/DL (ref 2.5–4.5)
POTASSIUM SERPL-SCNC: 3.9 MMOL/L (ref 3.4–5.3)

## 2024-12-13 PROCEDURE — 99232 SBSQ HOSP IP/OBS MODERATE 35: CPT | Performed by: INTERNAL MEDICINE

## 2024-12-13 PROCEDURE — 120N000002 HC R&B MED SURG/OB UMMC

## 2024-12-13 PROCEDURE — 250N000013 HC RX MED GY IP 250 OP 250 PS 637: Performed by: EMERGENCY MEDICINE

## 2024-12-13 PROCEDURE — 250N000013 HC RX MED GY IP 250 OP 250 PS 637: Performed by: INTERNAL MEDICINE

## 2024-12-13 PROCEDURE — 85610 PROTHROMBIN TIME: CPT

## 2024-12-13 PROCEDURE — 36415 COLL VENOUS BLD VENIPUNCTURE: CPT | Performed by: INTERNAL MEDICINE

## 2024-12-13 PROCEDURE — 250N000013 HC RX MED GY IP 250 OP 250 PS 637

## 2024-12-13 PROCEDURE — 84100 ASSAY OF PHOSPHORUS: CPT | Performed by: INTERNAL MEDICINE

## 2024-12-13 PROCEDURE — 83735 ASSAY OF MAGNESIUM: CPT | Performed by: INTERNAL MEDICINE

## 2024-12-13 PROCEDURE — 84132 ASSAY OF SERUM POTASSIUM: CPT | Performed by: INTERNAL MEDICINE

## 2024-12-13 RX ORDER — WARFARIN SODIUM 7.5 MG/1
15 TABLET ORAL
Status: COMPLETED | OUTPATIENT
Start: 2024-12-13 | End: 2024-12-13

## 2024-12-13 RX ADMIN — OXYCODONE HYDROCHLORIDE AND ACETAMINOPHEN 1000 MG: 500 TABLET ORAL at 08:44

## 2024-12-13 RX ADMIN — SENNOSIDES AND DOCUSATE SODIUM 1 TABLET: 8.6; 5 TABLET ORAL at 21:41

## 2024-12-13 RX ADMIN — ASPIRIN 81 MG CHEWABLE TABLET 81 MG: 81 TABLET CHEWABLE at 08:44

## 2024-12-13 RX ADMIN — SPIRONOLACTONE 50 MG: 25 TABLET ORAL at 08:44

## 2024-12-13 RX ADMIN — B-COMPLEX W/ C & FOLIC ACID TAB 1 TABLET: TAB at 08:44

## 2024-12-13 RX ADMIN — LEVOTHYROXINE SODIUM 175 MCG: 100 TABLET ORAL at 05:19

## 2024-12-13 RX ADMIN — Medication 25 MCG: at 08:45

## 2024-12-13 RX ADMIN — POTASSIUM CHLORIDE 40 MEQ: 1500 TABLET, EXTENDED RELEASE ORAL at 08:44

## 2024-12-13 RX ADMIN — METOPROLOL SUCCINATE 100 MG: 50 TABLET, EXTENDED RELEASE ORAL at 08:45

## 2024-12-13 RX ADMIN — ATORVASTATIN CALCIUM 40 MG: 40 TABLET, FILM COATED ORAL at 08:45

## 2024-12-13 RX ADMIN — CHLORHEXIDINE GLUCONATE, 0.12% ORAL RINSE 15 ML: 1.2 SOLUTION DENTAL at 19:13

## 2024-12-13 RX ADMIN — Medication 1 TABLET: at 08:45

## 2024-12-13 RX ADMIN — POTASSIUM CHLORIDE 40 MEQ: 1500 TABLET, EXTENDED RELEASE ORAL at 19:13

## 2024-12-13 RX ADMIN — METFORMIN HYDROCHLORIDE 500 MG: 500 TABLET, FILM COATED ORAL at 08:45

## 2024-12-13 RX ADMIN — WARFARIN SODIUM 15 MG: 7.5 TABLET ORAL at 17:44

## 2024-12-13 RX ADMIN — MAGNESIUM OXIDE TAB 400 MG (241.3 MG ELEMENTAL MG) 400 MG: 400 (241.3 MG) TAB at 08:44

## 2024-12-13 RX ADMIN — CHLORHEXIDINE GLUCONATE, 0.12% ORAL RINSE 15 ML: 1.2 SOLUTION DENTAL at 08:44

## 2024-12-13 RX ADMIN — TORSEMIDE 60 MG: 20 TABLET ORAL at 13:06

## 2024-12-13 RX ADMIN — METFORMIN HYDROCHLORIDE 500 MG: 500 TABLET, FILM COATED ORAL at 17:44

## 2024-12-13 ASSESSMENT — ACTIVITIES OF DAILY LIVING (ADL)
ADLS_ACUITY_SCORE: 69

## 2024-12-13 NOTE — PROGRESS NOTES
[facility]    Medicine Progress Note - Hospitalist Service, GOLD TEAM 19    Date of Admission:  12/7/2024    Assessment & Plan   A: Patient is a 75 y/o man who has a past medical history significant for diabetes mellitus type II, chronic kidney disease, heart failure with preserved ejection fraction, atrial fibrillation, depression, obstructive sleep apnea, chronic anemia, hypothyroidism, hyperlipidemia and BPH. Patient had been discharged to home from TCU on 25-Nov-2024 and had been doing well until 07-Dec-2024. Patient had expressed concern that he ws discharged too early but insurance reportedly was arguing whether to cover a longer stay Patient uses a walker to ambulate at baseline. Patient noted being unsteady on his feet and that his legs gave out on him. This led to EMS being called and patient being brought to the hospital. CT head was unremarkable.     Patient was found to have hypokalemia.     Patient noted having tachycardia with activity during hospital stay in 2019. Per chart review, patient did have heart rates increasing into the 150s at that time. Patient did not tolerate increased beta blockers due to hypotension. Patient's episodes of tachycardia was attributed to deconditioning.    Assessment and plan  1.) Mechanical fall: Etiology is unclear.  Suspect could be due to postural hypotension and tachycardia which could be due to adverse effects of medications.  Tamsulosin is on hold currently. No documented tachycardia episode since 12/12/24  - Monitoring for safety.  - PT/OT to continue to eval and treat     2.) Bilateral knee and ankle pain with movement:  - Knee and ankle x-rays were negative for fracture.  - Patient on acetaminophen as needed.     3.) Hypokalemia:  - Supplementing.     4.) Hyponatremia, resolved for now:  - Monitoring as needed.     5.) Hypophosphatemia, resolved:  - Supplementing as needed.     6.) Chronic heart failure with  "preserved ejection fraction:  - On 25-Oct-2024, echocardiogram showed LVEF 55-60%.  - Patient on torsemide and spironolactone.  - Monitoring for evidence of acute heart failure.     7.) Atrial fibrillation, possibly permanent:  - [id number] score was 5.  - Patient on coumadin for anticoagulation. INR therapeutic today.   - Patient on metoprolol for rate control.  - Patient was tachycardic with activity today. Monitoring for recurrence.      8.) Chronic hypercapnic respiratory failure; obstructive sleep apnea:  - Patient to continue CPAP.     9.) Chronic kidney disease, reportedly stage III:  - Monitoring labs as needed.     10.) Diabetes mellitus type II with neuropathy:  - On 07-Oct-2024, HbA1c was 6.2%.  - Patient usually on metformin and this is being continued.  - Monitoring blood glucose twice daily.      11.) Hyperlipidemia:  - Patient on lipitor.     12.) Chronic microcytic anemia:  - Patient on iron supplementation.     13.) Hypothyroidism:  - On 07-Oct-2024, TSH was 0.93.  - Patient on levothyroxine 175 mcg daily.     14.) Possible osteopenia on x-ray:  - Further evaluation as outpatient.     15.) BPH:  - Patient on flomax.     16.) Periodontal disease:  - During previous hospital stay, patient had been seen by dentistry - on evaluation, \"possible root tip maxillary left second molar (#15)\"  - Patient on chlorhexidine oral rinse twice daily.  - Patient should f/u with dentistry as outpatient.     17.) Major depressive disorder; generalized anxiety disorder:  - Patient not currently on medications for these.  - To f/u as outpatient.     18.) Chronic intermittent constipation:  - Bowel regimen.  19).  Recurrent falls, generalized weakness.  Tolerance of activity, tachycardia with minimal activity during PT sessions recurrent falls and near syncopal episodes could be secondary to tamsulosin which is currently on hold.          Diet: Combination Diet Regular Diet Adult  Snacks/Supplements Adult: Ensure Max " Protein (bariatric); With Meals    Bazzi Catheter: Not present  Lines: None     Cardiac Monitoring: None  Code Status: No CPR- Do NOT Intubate      Clinically Significant Risk Factors               # Hypoalbuminemia: Lowest albumin = 3.1 g/dL at 12/8/2024  6:52 AM, will monitor as appropriate     # Hypertension: Noted on problem list    # Chronic heart failure with preserved ejection fraction: heart failure noted on problem list and last echo with EF >50%           # Severe Obesity: Estimated body mass index is 46.43 kg/m  as calculated from the following:    Height as of this encounter: 1.829 m (6').    Weight as of this encounter: 155.3 kg (342 lb 6 oz).        # Financial/Environmental Concerns: none         Social Drivers of Health    Housing Stability: Low Risk  (12/8/2024)    Housing Stability     Do you have housing? : Yes     Are you worried about losing your housing?: No   Recent Concern: Housing Stability - High Risk (9/25/2024)    Housing Stability     Do you have housing? : No     Are you worried about losing your housing?: No          Moiz Dial MD  Hospitalist Service, GOLD TEAM 19  [facility]  Securely message with [employer] (more info)  Text page via [employer] Paging/Directory   See signed in provider for up to date coverage information  ______________________________________________________________________    Interval History   Patient reports increased edema of the legs as he was not wearing compression stockings for a couple of days, he is not happy about plan to discharge to TCU stating that no body is able to tell him why he keeps falling down frequently. I told him, could due to generalized weakness or orthostatic hypotension    Patient reportedly was tachycardic with PT. Patient noted no associated symptoms - patient noted no palpitations and no lightheadedness. Denies feeling dizzy lying down or during PT sessions.     Patient noted having  tachycardia with activity during hospital stay in 2019. Per chart review, patient did have heart rates increasing into the 150s at that time. Patient did not tolerate increased beta blockers due to hypotension. Patient's episodes of tachycardia was attributed to deconditioning.     Physical Exam   Vital Signs: Temp: 97.4  F (36.3  C) Temp src: Oral BP: 123/61 Pulse: 86   Resp: 16 SpO2: 96 % O2 Device: None (Room air)    Weight: 342 lbs 5.99 oz    Labs  CBC RESULTS:   Recent Labs   Last Comprehensive Metabolic Panel:  Lab Results   Component Value Date     12/10/2024    POTASSIUM 3.9 12/13/2024    CHLORIDE 102 12/10/2024    CO2 31 (H) 12/10/2024    ANIONGAP 8 12/10/2024     (H) 12/13/2024    BUN 10.8 12/10/2024    CR 1.01 12/10/2024    GFRESTIMATED 78 12/10/2024    CHERI 9.0 12/10/2024       CBC RESULTS:   Recent Labs   Lab Test 12/08/24  0652   WBC 7.5   RBC 4.52   HGB 11.3*   HCT 35.6*   MCV 79   MCH 25.0*   MCHC 31.7   RDW 18.8*              Medical Decision Making   Total time spent is 38 minutes       Care discussed with Rn and case  manager during rounds

## 2024-12-13 NOTE — PLAN OF CARE
Goal Outcome Evaluation:      Plan of Care Reviewed With: patient    Overall Patient Progress: improvingOverall Patient Progress: improving    Outcome Evaluation: Patient alert and oriented x4. Able to make needs known, call light within reach. Patient denies SOB, chest pain, and nausea. Patient c/o bilateral knees, shoulders and legs pain, but refused intervention. VSS on RA. Patient Ax2 with walker and gait belt, not OOB with nursing this shift. Continent of bowel and bladder, last reported 12/9, voiding without difficulties, uses the urinal. Redness under right breast improving. Scattered bruising to extremities, bilateral lower extremities red/jacek with some notable swelling, patient encouraged to elevate extremities. Left PIV patent and SL.      Plan is to discharge patient to TCU on 12/13.    Continue with plan of care.

## 2024-12-13 NOTE — PROGRESS NOTES
CARE MANAGEMENT    CHW sent preferred TCU options of the patient.    ACCEPTED:  [facility]  [place], [place]  [phone]  12/13: CHW spoke with the Admissions Coordinator on if patient could still receive TCU services.She agreed to let him admit after there is clarity with the OBRA. Info was shared with the SW who will follow-up.    PENDING:  [facility] [place], [place]   [phone]  12/13: Admissions is reviewing the patient's referral.    DECLINED:  [facility]  [place]  [phone]  DECLINED:NO BEDS    [facility], [place]  [phone]  12/13:DECLINED: Bariatric needs    [place], [place]  [phone]  12/13:DECLINED: No Beds,cannot meet the patient's needs,change in discharge plans,facility is full.    [facility] [place]  [phone]  12/13:DECLINED: No Beds    12/11: CHW spoke with the patient about sending more referrals. He does not want other referrals sent. He is talking to the Provider to get an understanding of his falls,   He asked his Provider to do further assessment ssurrounding his BP and heart issues.   He wants more updated information from PT/OT before he decides on his next steps going forward on his preferred referrals.  12/10: CHW picked a list of TCU options on Medicare .gov and gave to the patient for his preferred choices,  Patient was resistant and asked why is he here@ [facility]. CHW asked the patient's nurse to ask the Provider if possible to come and address  some difficult questions the patient is very concerned about.  He complained of going to a poor quality TCU with 5 stars listed. ([facility] in [place]) He claimsma misunderstanding on why he is notwalking and decreasing his falls.  He is very reserved on choosing other options without detailed research.  Care Management will follow.    Addendum:12/10/2024@12:44PM    Patient apologize profusely  for being rude as I gave him the Medicare.gov list of TCU options.  CHW accepted the patient's apology and shared empathy on what he has gone through.    Miguel Alvarez  In-Patient CHW

## 2024-12-13 NOTE — PLAN OF CARE
Goal Outcome Evaluation:      Plan of Care Reviewed With: patient    Overall Patient Progress: no changeOverall Patient Progress: no change    Outcome Evaluation: Patient A&Ox4, Ax2 with gait belt and walker, uses urinal at bedside. Afternoon medications administered. VSS .

## 2024-12-13 NOTE — PLAN OF CARE
Goal Outcome Evaluation:      Plan of Care Reviewed With: patient    Overall Patient Progress: no changeOverall Patient Progress: no change       Alert and orientated x4, able to make needs known. VSS on RA. C/o pain being generalized all over body, pt did not want any pain medication intervention. Continent of bladder and bowel, utilizes urinal at bedside, Had large soft formed BM this shift. Up Ax2 with walker and gaitbelt. Has slight redness under R breast. Has scattered bruising to extremitied. Has L PIV-SL. Call light in reach, bed alarm on for safety. Plan is to discharge to TCU, pending acceptance.     Vital signs:  Temp: 97.8  F (36.6  C) Temp src: Oral BP: 123/61 Pulse: 87   Resp: 18 SpO2: 96 % O2 Device: None (Room air)

## 2024-12-13 NOTE — PROGRESS NOTES
Care Management Discharge Note    Discharge Date: 12/14/24     Discharge Disposition: Transitional Care    Discharge Services: Post Acute Therapies     Discharge DME: None    Discharge Transportation: [email or web address] Wheelchair transportation between 4:30 pm and 5:17 pm Phone [phone]    Private pay costs discussed: transportation costs    Does the patient's insurance plan have a 3 day qualifying hospital stay waiver?  No    PAS Confirmation Code: [id number]    Patient/family educated on Medicare website which has current facility and service quality ratings: Yes    Education Provided on the Discharge Plan: Yes    Persons Notified of Discharge Plans: Charge Nurse, MD, Patient, Bedside Nurse, TCU Staff    Patient/Family in Agreement with the Plan: Yes    Handoff Referral Completed: Yes, [facility] PCP: Internal handoff referral completed    Additional Information:    [facility] between 4:30 pm and 5:17 pm via [email or web address] wheelchair transportation. Patient was discussed with of the possible costs associated. Phone for transportation: [phone]. IMM completed, documented in Epic, faxed to HIM, copy provided to patient and original in chart. Nurse report is requested by calling facility at [phone]. Discharge orders to be faxed to [phone] when completed.     JANE Hayward, MSW  6th Floor Medical Surgical Unit  Phone [phone]      Message me securely in [facility]

## 2024-12-13 NOTE — PROGRESS NOTES
CARE MANAGEMENT    CHW sent preferred TCU options of the patient.    ACCEPTED:  [facility]  [place], [place]  [phone]  12/13: CHW spoke with the Admissions Coordinator on if patient could still receive TCU services.She agreed to let him admit after there is clarity with the OBRA. Info was shared with the SW who will follow-up.    PENDING:  [place], [place]   [phone]  12/13: Admissions is reviewing the patient's referral.    DECLINED:  [facility]  [place]  [phone]  DECLINED:NO BEDS    [facility]  DECLINED: CANNOT MEET THE PATIENT'S NEEDS, PATIENT NEEDS LTC AT DISCHARGE.    [facility] &[place], [place]  [phone]  12/13:DECLINED: Bariatric needs    [place], [place]  [phone]  12/13:DECLINED: No Beds,cannot meet the patient's needs,change in discharge plans,facility is full.    [facility]  [place]  [phone]  12/13:DECLINED: No Beds    12/11: CHW spoke with the patient about sending more referrals. He does not want other referrals sent. He is talking to the Provider to get an understanding of his falls,   He asked his Provider to do further assessment ssurrounding his BP and heart issues.   He wants more updated information from PT/OT before he decides on his next steps going forward on his preferred referrals.  12/10: CHW picked a list of TCU options on Medicare .gov and gave to the patient for his preferred choices,  Patient was resistant and asked why is he here@ [facility]. CHW asked the patient's nurse to ask the Provider if possible to come and address  some difficult questions the patient is very concerned about.  He complained of going to a poor quality TCU with 5 stars listed. ([facility] in [place]) He claimsma misunderstanding on why he is notwalking and decreasing his falls.  He is very reserved on choosing other options without detailed research.  Care  Management will follow.    Addendum:12/10/2024@12:44PM    Patient apologize profusely for being rude as I gave him the Medicare.gov list of TCU options.  CHW accepted the patient's apology and shared empathy on what he has gone through.    Miguel Alvarez  In-Patient [facility]

## 2024-12-14 VITALS
HEIGHT: 72 IN | RESPIRATION RATE: 18 BRPM | OXYGEN SATURATION: 97 % | BODY MASS INDEX: 42.66 KG/M2 | HEART RATE: 90 BPM | DIASTOLIC BLOOD PRESSURE: 61 MMHG | TEMPERATURE: 98.8 F | WEIGHT: 315 LBS | SYSTOLIC BLOOD PRESSURE: 115 MMHG

## 2024-12-14 LAB
GLUCOSE BLDC GLUCOMTR-MCNC: 116 MG/DL (ref 70–99)
GLUCOSE BLDC GLUCOMTR-MCNC: 129 MG/DL (ref 70–99)
INR PPP: 1.65 (ref 0.85–1.15)
MAGNESIUM SERPL-MCNC: 1.9 MG/DL (ref 1.7–2.3)
PHOSPHATE SERPL-MCNC: 3.4 MG/DL (ref 2.5–4.5)
POTASSIUM SERPL-SCNC: 4 MMOL/L (ref 3.4–5.3)

## 2024-12-14 PROCEDURE — 250N000013 HC RX MED GY IP 250 OP 250 PS 637: Performed by: EMERGENCY MEDICINE

## 2024-12-14 PROCEDURE — 83735 ASSAY OF MAGNESIUM: CPT | Performed by: INTERNAL MEDICINE

## 2024-12-14 PROCEDURE — 36415 COLL VENOUS BLD VENIPUNCTURE: CPT | Performed by: INTERNAL MEDICINE

## 2024-12-14 PROCEDURE — 250N000013 HC RX MED GY IP 250 OP 250 PS 637: Performed by: INTERNAL MEDICINE

## 2024-12-14 PROCEDURE — 99239 HOSP IP/OBS DSCHRG MGMT >30: CPT | Performed by: INTERNAL MEDICINE

## 2024-12-14 PROCEDURE — 85610 PROTHROMBIN TIME: CPT

## 2024-12-14 PROCEDURE — 84100 ASSAY OF PHOSPHORUS: CPT | Performed by: INTERNAL MEDICINE

## 2024-12-14 PROCEDURE — 84132 ASSAY OF SERUM POTASSIUM: CPT | Performed by: INTERNAL MEDICINE

## 2024-12-14 PROCEDURE — 250N000013 HC RX MED GY IP 250 OP 250 PS 637

## 2024-12-14 RX ORDER — MAGNESIUM OXIDE 400 MG/1
400 TABLET ORAL DAILY
Qty: 30 TABLET | Refills: 0 | Status: SHIPPED | OUTPATIENT
Start: 2024-12-15 | End: 2024-12-14

## 2024-12-14 RX ORDER — MAGNESIUM OXIDE 400 MG/1
400 TABLET ORAL DAILY
Qty: 30 TABLET | Refills: 0 | Status: SHIPPED | OUTPATIENT
Start: 2024-12-15 | End: 2025-01-14

## 2024-12-14 RX ORDER — WARFARIN SODIUM 7.5 MG/1
15 TABLET ORAL
Status: DISCONTINUED | OUTPATIENT
Start: 2024-12-14 | End: 2024-12-14 | Stop reason: HOSPADM

## 2024-12-14 RX ADMIN — METFORMIN HYDROCHLORIDE 500 MG: 500 TABLET, FILM COATED ORAL at 08:00

## 2024-12-14 RX ADMIN — SPIRONOLACTONE 50 MG: 25 TABLET ORAL at 08:00

## 2024-12-14 RX ADMIN — LEVOTHYROXINE SODIUM 175 MCG: 100 TABLET ORAL at 05:50

## 2024-12-14 RX ADMIN — TORSEMIDE 60 MG: 20 TABLET ORAL at 12:06

## 2024-12-14 RX ADMIN — B-COMPLEX W/ C & FOLIC ACID TAB 1 TABLET: TAB at 08:00

## 2024-12-14 RX ADMIN — ASPIRIN 81 MG CHEWABLE TABLET 81 MG: 81 TABLET CHEWABLE at 08:00

## 2024-12-14 RX ADMIN — OXYCODONE HYDROCHLORIDE AND ACETAMINOPHEN 1000 MG: 500 TABLET ORAL at 08:00

## 2024-12-14 RX ADMIN — Medication 1 TABLET: at 08:00

## 2024-12-14 RX ADMIN — Medication 25 MCG: at 08:00

## 2024-12-14 RX ADMIN — CHLORHEXIDINE GLUCONATE, 0.12% ORAL RINSE 15 ML: 1.2 SOLUTION DENTAL at 08:00

## 2024-12-14 RX ADMIN — FERROUS SULFATE TAB 325 MG (65 MG ELEMENTAL FE) 325 MG: 325 (65 FE) TAB at 08:05

## 2024-12-14 RX ADMIN — POTASSIUM CHLORIDE 40 MEQ: 1500 TABLET, EXTENDED RELEASE ORAL at 08:00

## 2024-12-14 RX ADMIN — METOPROLOL SUCCINATE 100 MG: 50 TABLET, EXTENDED RELEASE ORAL at 08:00

## 2024-12-14 RX ADMIN — ATORVASTATIN CALCIUM 40 MG: 40 TABLET, FILM COATED ORAL at 08:00

## 2024-12-14 RX ADMIN — MAGNESIUM OXIDE TAB 400 MG (241.3 MG ELEMENTAL MG) 400 MG: 400 (241.3 MG) TAB at 08:00

## 2024-12-14 ASSESSMENT — ACTIVITIES OF DAILY LIVING (ADL)
ADLS_ACUITY_SCORE: 69

## 2024-12-14 NOTE — PLAN OF CARE
Goal Outcome Evaluation:      Plan of Care Reviewed With: patient    Overall Patient Progress: no changeOverall Patient Progress: no change      VS: /69 (BP Location: Right arm)   Pulse 94   Temp 97.4  F (36.3  C) (Oral)   Resp 18   Ht 1.829 m (6')   Wt (!) 155.3 kg (342 lb 6 oz)   SpO2 95%   BMI 46.43 kg/m       O2: 95% on R/A   Output: Continent of bowel and bladder   Last BM: 12/13/24   Activity: Assist of two walker   Up for meals? bedside   Skin: Scattered bruising to BLE, redness under breast   Pain: denies   CMS: Alert and oriented   Dressing: none   Diet: regular   LDA: Left PIV SL   Equipment: Walker    Plan: Cont. Poc    Additional Info: Patient remains alert and oriented, call light appropriate. Denies chest pain, SON, N/V.

## 2024-12-14 NOTE — DISCHARGE SUMMARY
6MS DISCHARGE    D: Patient discharged to TCU at 1800. Patient accompanied by [facility] transport.    I:Discharge prescriptions given to patient. All discharge medications and instructions reviewed with patient. Other phone numbers to call with questions or concerns after discharge reviewed. PIV removed. Education completed.    A: Patient verbalized understanding of discharge medications and instructions. Prescribed home medications given to patient.  Belongings returned to patient.    P: Patient to follow-up with nursing home physician.

## 2024-12-14 NOTE — PROGRESS NOTES
Care Management Follow Up    Please see all discharge information on note dated 12/13/24. Writer faxed discharge orders to facility. Writer alerted facility that the orders are coming and asked them to call if they are not received or if something is incorrect. Writer provided nurse report number to chart nurse to share with bedside nurse. No scripts are needed.    JANE Hayward, MSW  6th Floor Medical Surgical Unit  Phone [phone]      Message me securely in [employer]

## 2024-12-14 NOTE — PLAN OF CARE
Goal Outcome Evaluation:      Plan of Care Reviewed With: patient    Overall Patient Progress: no changeOverall Patient Progress: no change         Patient remains alert and oriented, call light appropriate. Denies chest pain, SOB, N/V. Assist of two walker, urinal at bedside, slept through the night, VSS, call alight within reach.

## 2024-12-14 NOTE — DISCHARGE SUMMARY
Medicine Discharge Summary       Clarke Moreira MRN# [id number]   YOB: 1950 Age: 74 year old     Date of Admission:  12/7/2024  Date of Discharge:  12/14/2024  Admitting Physician:  Syed Nichols MD  Discharge Physician:  Adela Chairez  Discharging Service:  Hospital Medicine     Primary Provider: Henrry Shepard              Discharge Diagnosis:   As below          Consultations:   Social work  PT and OT         Hospital Course     A: Patient is a 73 y/o man who has a past medical history significant for diabetes mellitus type II, chronic kidney disease, heart failure with preserved ejection fraction, atrial fibrillation, depression, obstructive sleep apnea, chronic anemia, hypothyroidism, hyperlipidemia and BPH. Patient had been discharged to home from TCU on 25-Nov-2024 and had been doing well until 07-Dec-2024. Patient had expressed concern that he ws discharged too early but insurance reportedly was arguing whether to cover a longer stay Patient uses a walker to ambulate at baseline. Patient noted being unsteady on his feet and that his legs gave out on him. This led to EMS being called and patient being brought to the hospital. CT head was unremarkable. Patient was found to have hypokalemia.     Patient noted having tachycardia with activity during hospital stay in 2019. Per chart review, patient did have heart rates increasing into the 150s at that time. Patient did not tolerate increased beta blockers due to hypotension. Patient's episodes of tachycardia was attributed to deconditioning.    1.) Mechanical fall: Etiology is unclear.  Suspect could be due to postural hypotension and tachycardia which could be due to adverse effects of medications.  Tamsulosin is discontinued at discharge. No documented tachycardia episode since 12/12/24       2.) Bilateral knee and ankle pain with movement:  - Knee and ankle x-rays were negative for fracture.  - Patient on acetaminophen as needed.    "  3.) Hypokalemia:  - Supplementing. I weekly bmp while at [facility]     4.) Hyponatremia, resolved for now:  - Monitoring as needed.     5.) Hypophosphatemia, resolved:  - Supplementing as needed.     6.) Chronic heart failure with preserved ejection fraction:  - On 25-Oct-2024, echocardiogram showed LVEF 55-60%.  - Patient on torsemide and spironolactone.  - Monitoring for evidence of acute heart failure.     7.) Atrial fibrillation, possibly permanent:  - [id number] score was 5.  - Patient on coumadin for anticoagulation. INR therapeutic today.   - Patient on metoprolol for rate control.  - Patient was tachycardic with activity today. Monitoring for recurrence.      8.) Chronic hypercapnic respiratory failure; obstructive sleep apnea:  - Patient to continue CPAP.     9.) Chronic kidney disease, reportedly stage III:  - Monitoring labs as needed.     10.) Diabetes mellitus type II with neuropathy:  - On 07-Oct-2024, HbA1c was 6.2%.  - Patient usually on metformin and this is being continued.  - Monitoring blood glucose twice daily.      11.) Hyperlipidemia:  - Patient on lipitor.     12.) Chronic microcytic anemia:  - Patient on iron supplementation.     13.) Hypothyroidism:  - On 07-Oct-2024, TSH was 0.93.  - Patient on levothyroxine 175 mcg daily.     14.) Possible osteopenia on x-ray:  - Further evaluation as outpatient.     15.) BPH:  - Patient on flomax. Discontinued due to suspected near syncopal episode and postural tachycardia which resolved after discontinuation of Tamsulosin     16.) Periodontal disease:  - During previous hospital stay, patient had been seen by dentistry - on evaluation, \"possible root tip maxillary left second molar (#15)\"  - Patient on chlorhexidine oral rinse twice daily.  - Patient should f/u with dentistry as outpatient.     17.) Major depressive disorder; generalized anxiety disorder:  - Patient not currently on medications for these.  - To f/u as outpatient.     18.) Chronic " intermittent constipation:  - Bowel regimen.  19).  Recurrent falls, generalized weakness.  Tolerance of activity, tachycardia with minimal activity during PT sessions recurrent falls and near syncopal episodes could be secondary to tamsulosin which is discontinued. .               On Exam ;   Alert and oriented . No acute distress  Vital signs:  Temp: 98.8  F (37.1  C) Temp src: Oral BP: 115/61 Pulse: 90   Resp: 18 SpO2: 97 % O2 Device: None (Room air)   Height: 182.9 cm (6') Weight: (!) 155.3 kg (342 lb 6 oz)  Estimated body mass index is 46.43 kg/m  as calculated from the following:    Height as of this encounter: 1.829 m (6').    Weight as of this encounter: 155.3 kg (342 lb 6 oz).  Neck ; supple , no JVD  Chest ; equal BS bilaterally , no rales or rhonchi   CVS; RRR, no murmur /rubs or gallops  GI ; soft abdomen, non tender, BS positive  Ext ; no edema , no cynosis  Neuro ; CN 2 to 12 grossly intact , No Facial asymmetry noticed  .  Psych ; appropriate mood and effect  Skin ; no rash or purpura on exposed skin     ROUTINE IP LABS (Last four results)  BMP  Recent Labs   Lab 12/14/24  0756 12/14/24  0605 12/13/24  1747 12/13/24  1254 12/13/24  0846 12/13/24  0735 12/12/24  0809 12/12/24  0642 12/11/24  0748 12/11/24  0657 12/10/24  0854 12/10/24  0504 12/08/24  0759 12/08/24  0652 12/07/24  2026 12/07/24  1419   NA  --   --   --   --   --   --   --   --   --   --   --  141  --  136  --  133*   POTASSIUM  --  4.0  --   --   --  3.9  --  4.3  --  4.1  --  4.3   < > 2.7*   < > 2.6*   CHLORIDE  --   --   --   --   --   --   --   --   --   --   --  102  --  97*  --  92*   CHERI  --   --   --   --   --   --   --   --   --   --   --  9.0  --  8.9  --  9.1   CO2  --   --   --   --   --   --   --   --   --   --   --  31*  --  27  --  25   BUN  --   --   --   --   --   --   --   --   --   --   --  10.8  --  10.1  --  14.0   CR  --   --   --   --   --   --   --   --   --   --   --  1.01  --  0.90  --  1.21*   *  --   138* 102* 138*  --    < >  --    < > 97   < > 96   < > 102*  --  152*    < > = values in this interval not displayed.     CBC  Recent Labs   Lab 12/08/24  0652 12/07/24  1419   WBC 7.5 10.7   RBC 4.52 4.91   HGB 11.3* 11.7*   HCT 35.6* 38.8*   MCV 79 79   MCH 25.0* 23.8*   MCHC 31.7 30.2*   RDW 18.8* 18.9*    342     INR  Recent Labs   Lab 12/14/24  0605 12/13/24  0735 12/12/24  0642 12/11/24  0657   INR 1.65* 1.53* 1.51* 1.61*                    Discharge Medications:     Current Discharge Medication List        START taking these medications    Details   magnesium oxide (MAG-OX) 400 MG tablet Take 1 tablet (400 mg) by mouth daily.  Qty: 30 tablet, Refills: 0    Associated Diagnoses: Weakness; Unsteadiness on feet; Long term (current) use of anticoagulants           CONTINUE these medications which have NOT CHANGED    Details   torsemide (DEMADEX) 20 MG tablet Take 60 mg by mouth daily.      acetaminophen (TYLENOL) 325 MG tablet Take 2 tablets (650 mg) by mouth every 4 hours as needed for mild pain or other (and adjunct with moderate or severe pain or per patient request).    Associated Diagnoses: Fall from bed, initial encounter      aspirin (ASA) 81 MG chewable tablet Take 1 tablet (81 mg) by mouth daily.  Qty: 200 tablet, Refills: 2    Associated Diagnoses: Essential hypertension      atorvastatin (LIPITOR) 40 MG tablet Take 1 tablet (40 mg) by mouth daily  Qty: 90 tablet, Refills: 3    Associated Diagnoses: Type 2 diabetes mellitus without complication, without long-term current use of insulin (H)      CERTAVITE/ANTIOXIDANTS tablet TAKE ONE TABLET BY MOUTH ONE TIME DAILY  Qty: 100 tablet, Refills: 0    Associated Diagnoses: Type 2 diabetes mellitus without complication, without long-term current use of insulin (H)      ferrous sulfate (FEROSUL) 325 (65 Fe) MG tablet Take 1 tablet (325 mg) by mouth every other day. For iron deficiency anemia  Qty: 60 tablet, Refills: 1    Associated Diagnoses: Anemia,  unspecified type      levothyroxine (SYNTHROID/LEVOTHROID) 175 MCG tablet Take 1 tablet (175 mcg) by mouth every morning (before breakfast)  Qty: 90 tablet, Refills: 3    Associated Diagnoses: Hypothyroidism, unspecified type      metFORMIN (GLUCOPHAGE) 500 MG tablet Take 1 tablet (500 mg) by mouth 2 times daily (with meals).  Qty: 180 tablet, Refills: 3    Associated Diagnoses: Type 2 diabetes mellitus without complication, without long-term current use of insulin (H)      metoprolol succinate ER (TOPROL XL) 100 MG 24 hr tablet Take 1 tablet (100 mg) by mouth daily.    Associated Diagnoses: Paroxysmal atrial fibrillation (H)      Omega-3 Fatty Acids (EQL FISH OIL) 1000 MG CAPS Take 1 capsule by mouth daily  Qty: 90 capsule, Refills: 0    Associated Diagnoses: Hyperlipidemia LDL goal <100      !! order for DME Equipment being ordered: Diabetes Shoes  Qty: 1 Units, Refills: 0    Associated Diagnoses: Type 2 diabetes mellitus with hyperglycemia, without long-term current use of insulin (H)      !! order for DME Equipment being ordered: Custom diabetic shoes  Qty: 1 Units, Refills: 0    Associated Diagnoses: Type 2 diabetes mellitus without complication, without long-term current use of insulin (H)      !! order for DME Equipment being ordered: Bariatric Lift chair  Diagnosis - morbid obesity, CHF, Lymphedema    Fax to Ne from [facility] at [phone].  Qty: 1 Units, Refills: 0    Associated Diagnoses: Chronic systolic congestive heart failure (H); Lymphedema; Morbid obesity (H)      !! order for DME Equipment being ordered: Other: Velcro Compression stockings.   Treatment Diagnosis: bilateral lymphedema.  Qty: 2 each, Refills: 0    Associated Diagnoses: Lymphedema of extremity      potassium chloride chen ER (KLOR-CON M20) 20 MEQ CR tablet Take 2 tablets (40 mEq) by mouth 2 times daily.    Associated Diagnoses: Chronic heart failure with preserved ejection fraction (H)      senna-docusate (SENEXON-S) 8.6-50 MG  tablet Take 1 to 2 tablets by mouth 2 times daily as needed for constipation  Qty: 180 tablet, Refills: 0    Associated Diagnoses: Constipation, unspecified constipation type      spironolactone (ALDACTONE) 50 MG tablet Take 1 tablet (50 mg) by mouth daily.    Associated Diagnoses: Chronic heart failure with preserved ejection fraction (H)      urea (CARMOL) 10 % external lotion Apply topically 2 times daily as needed for dry skin.    Associated Diagnoses: Adequate nutrition      VITAMIN A PO Take 1 tablet by mouth daily.      vitamin B complex with vitamin C (VITAMIN  B COMPLEX) tablet Take 1 tablet by mouth daily  Qty: 100 tablet, Refills: 3    Associated Diagnoses: Adequate nutrition      vitamin C (ASCORBIC ACID) 1000 MG TABS Take 1,000 mg by mouth daily      Vitamin D, Cholecalciferol, 25 MCG (1000 UT) CAPS Take 1 capsule by mouth daily.      vitamin E (VITAMIN E COMPLEX) 1000 units (450 mg) capsule Take 1 capsule (1,000 Units) by mouth daily  Qty: 100 capsule, Refills: 3    Associated Diagnoses: Adequate nutrition      !! warfarin ANTICOAGULANT (COUMADIN) 3 MG tablet Take by mouth 8 mg (5 mg x 1 tablet and 3 mg x 1 tablet) every day OR as directed by INR clinic  Qty: 90 tablet, Refills: 1    Associated Diagnoses: Chronic atrial fibrillation (H); Long term (current) use of anticoagulants; Paroxysmal atrial fibrillation (H); Persistent atrial fibrillation (H)      !! warfarin ANTICOAGULANT (COUMADIN) 5 MG tablet Take by mouth 8 mg (5 mg x 1 tablet and 3 mg x 1 tablet) every day OR as directed by INR clinic  Qty: 90 tablet, Refills: 1    Associated Diagnoses: Chronic atrial fibrillation (H); Long term (current) use of anticoagulants; Paroxysmal atrial fibrillation (H); Persistent atrial fibrillation (H)       !! - Potential duplicate medications found. Please discuss with provider.        STOP taking these medications       artificial saliva (BIOTENE MT) SOLN solution Comments:   Reason for Stopping:          chlorhexidine (PERIDEX) 0.12 % solution Comments:   Reason for Stopping:         tamsulosin (FLOMAX) 0.4 MG capsule Comments:   Reason for Stopping:                    Discharge Instructions and Follow-Up:     Discharge Procedure Orders   Primary Care - Care Coordination Referral   Standing Status: Future   Referral Priority: Routine: Next available opening Referral Type: Care Coordination   Number of Visits Requested: 1     General info for SNF   Order Comments: Length of Stay Estimate: Short Term Care: Estimated # of Days <30  Condition at Discharge: Improving  Level of care:skilled   Rehabilitation Potential: Fair  Admission H&P remains valid and up-to-date: Yes  Recent Chemotherapy: N/A  Use Nursing Home Standing Orders: Yes     Mantoux instructions   Order Comments: Give two-step Mantoux (PPD) Per Facility Policy Yes     Follow Up and recommended labs and tests   Order Comments: Follow up with [facility] physician.  The following labs/tests are recommended: biweekly labs.     Reason for your hospital stay   Order Comments: Recurrent falls, weakness, tachycardia     Activity - Up with nursing assistance     Order Specific Question Answer Comments   Is discharge order? Yes      Physical Therapy Adult Consult   Order Comments: Evaluate and treat as clinically indicated.    Reason:  weakness, falls     Occupational Therapy Adult Consult   Order Comments: Evaluate and treat as clinically indicated.    Reason:  weakness     Fall precautions     Diet   Order Comments: Follow this diet upon discharge: Current Diet:Orders Placed This Encounter      Snacks/Supplements Adult: Ensure Max Protein (bariatric); With Meals      Combination Diet Regular Diet Adult     Order Specific Question Answer Comments   Is discharge order? Yes          Primary Care - Care Coordination Referral      General info for SNF    Length of Stay Estimate: Short Term Care: Estimated # of Days <30  Condition at Discharge: Improving  Level of  care:skilled   Rehabilitation Potential: Fair  Admission H&P remains valid and up-to-date: Yes  Recent Chemotherapy: N/A  Use Nursing Home Standing Orders: Yes     Mantoux instructions    Give two-step Mantoux (PPD) Per Facility Policy Yes     Follow Up and recommended labs and tests    Follow up with [facility] physician.  The following labs/tests are recommended: biweekly labs.     Reason for your hospital stay    Recurrent falls, weakness, tachycardia     Activity - Up with nursing assistance     Physical Therapy Adult Consult    Evaluate and treat as clinically indicated.    Reason:  weakness, falls     Occupational Therapy Adult Consult    Evaluate and treat as clinically indicated.    Reason:  weakness     Fall precautions     Diet    Follow this diet upon discharge: Current Diet:Orders Placed This Encounter      Snacks/Supplements Adult: Ensure Max Protein (bariatric); With Meals      Combination Diet Regular Diet Adult                Discharge Disposition:   35 minutes spent in discharge, including >50% in counseling and coordination of care, medication review and plan of care recommended on follow up. Questions were answered to satisfaction       Moiz Dial MD  Internal Medicine Hospitalist & Staff Physician  [facility]

## 2024-12-15 ENCOUNTER — LAB REQUISITION (OUTPATIENT)
Dept: LAB | Facility: CLINIC | Age: 74
End: 2024-12-15
Payer: COMMERCIAL

## 2024-12-15 DIAGNOSIS — Z11.1 ENCOUNTER FOR SCREENING FOR RESPIRATORY TUBERCULOSIS: ICD-10-CM

## 2024-12-15 DIAGNOSIS — I48.0 PAROXYSMAL ATRIAL FIBRILLATION (H): ICD-10-CM

## 2024-12-15 DIAGNOSIS — Z00.00 ENCOUNTER FOR GENERAL ADULT MEDICAL EXAMINATION WITHOUT ABNORMAL FINDINGS: ICD-10-CM

## 2024-12-15 NOTE — PLAN OF CARE
Physical Therapy Discharge Summary    Reason for therapy discharge:    Discharged to transitional care facility.    Progress towards therapy goal(s). See goals on Care Plan in [facility] electronic health record for goal details.  Goals partially met.  Barriers to achieving goals:   discharge from facility.    Therapy recommendation(s):    Continued therapy is recommended.  Rationale/Recommendations:  Pt currently below baseline and is at high risk of falling. Pt would benefit from a TCU stay for strengthening and increase independence with all functional mobility..

## 2024-12-16 ENCOUNTER — DOCUMENTATION ONLY (OUTPATIENT)
Dept: GERIATRICS | Facility: CLINIC | Age: 74
End: 2024-12-16

## 2024-12-16 ENCOUNTER — TRANSITIONAL CARE UNIT VISIT (OUTPATIENT)
Dept: GERIATRICS | Facility: CLINIC | Age: 74
End: 2024-12-16
Payer: COMMERCIAL

## 2024-12-16 ENCOUNTER — TELEPHONE (OUTPATIENT)
Dept: GERIATRICS | Facility: CLINIC | Age: 74
End: 2024-12-16

## 2024-12-16 ENCOUNTER — DOCUMENTATION ONLY (OUTPATIENT)
Dept: ANTICOAGULATION | Facility: CLINIC | Age: 74
End: 2024-12-16

## 2024-12-16 VITALS
DIASTOLIC BLOOD PRESSURE: 68 MMHG | RESPIRATION RATE: 16 BRPM | TEMPERATURE: 98.2 F | BODY MASS INDEX: 44.1 KG/M2 | WEIGHT: 315 LBS | HEIGHT: 71 IN | SYSTOLIC BLOOD PRESSURE: 143 MMHG | HEART RATE: 90 BPM | OXYGEN SATURATION: 98 %

## 2024-12-16 DIAGNOSIS — I50.32 CHRONIC HEART FAILURE WITH PRESERVED EJECTION FRACTION (H): ICD-10-CM

## 2024-12-16 DIAGNOSIS — I48.20 CHRONIC ATRIAL FIBRILLATION (H): ICD-10-CM

## 2024-12-16 DIAGNOSIS — I48.20 CHRONIC ATRIAL FIBRILLATION (H): Primary | ICD-10-CM

## 2024-12-16 DIAGNOSIS — R29.6 FALLS FREQUENTLY: ICD-10-CM

## 2024-12-16 DIAGNOSIS — I48.0 PAROXYSMAL ATRIAL FIBRILLATION (H): ICD-10-CM

## 2024-12-16 DIAGNOSIS — N18.32 TYPE 2 DIABETES MELLITUS WITH STAGE 3B CHRONIC KIDNEY DISEASE, WITHOUT LONG-TERM CURRENT USE OF INSULIN (H): ICD-10-CM

## 2024-12-16 DIAGNOSIS — G47.33 OSA (OBSTRUCTIVE SLEEP APNEA): ICD-10-CM

## 2024-12-16 DIAGNOSIS — E11.22 TYPE 2 DIABETES MELLITUS WITH STAGE 3B CHRONIC KIDNEY DISEASE, WITHOUT LONG-TERM CURRENT USE OF INSULIN (H): ICD-10-CM

## 2024-12-16 DIAGNOSIS — M25.562 CHRONIC PAIN OF BOTH KNEES: Primary | ICD-10-CM

## 2024-12-16 DIAGNOSIS — I50.32 HYPERTENSIVE HEART DISEASE WITH CHRONIC DIASTOLIC CONGESTIVE HEART FAILURE (H): ICD-10-CM

## 2024-12-16 DIAGNOSIS — Z79.01 LONG TERM (CURRENT) USE OF ANTICOAGULANTS: ICD-10-CM

## 2024-12-16 DIAGNOSIS — G89.29 CHRONIC PAIN OF BOTH KNEES: Primary | ICD-10-CM

## 2024-12-16 DIAGNOSIS — Z79.01 LONG TERM CURRENT USE OF ANTICOAGULANT THERAPY: ICD-10-CM

## 2024-12-16 DIAGNOSIS — M25.561 CHRONIC PAIN OF BOTH KNEES: Primary | ICD-10-CM

## 2024-12-16 DIAGNOSIS — R53.81 PHYSICAL DECONDITIONING: ICD-10-CM

## 2024-12-16 DIAGNOSIS — I11.0 HYPERTENSIVE HEART DISEASE WITH CHRONIC DIASTOLIC CONGESTIVE HEART FAILURE (H): ICD-10-CM

## 2024-12-16 LAB — INR PPP: 2.33 (ref 0.85–1.15)

## 2024-12-16 PROCEDURE — 99310 SBSQ NF CARE HIGH MDM 45: CPT | Performed by: NURSE PRACTITIONER

## 2024-12-16 PROCEDURE — 86481 TB AG RESPONSE T-CELL SUSP: CPT | Mod: ORL | Performed by: NURSE PRACTITIONER

## 2024-12-16 PROCEDURE — P9604 ONE-WAY ALLOW PRORATED TRIP: HCPCS | Mod: ORL | Performed by: NURSE PRACTITIONER

## 2024-12-16 PROCEDURE — 36415 COLL VENOUS BLD VENIPUNCTURE: CPT | Mod: ORL | Performed by: NURSE PRACTITIONER

## 2024-12-16 PROCEDURE — 85610 PROTHROMBIN TIME: CPT | Mod: ORL | Performed by: NURSE PRACTITIONER

## 2024-12-16 NOTE — PROGRESS NOTES
[facility] GERIATRICS    PRIMARY CARE PROVIDER AND CLINIC:  Henrry Shepard MD, [address] [place] [address] / [facility] [id number]  Chief Complaint   Patient presents with    Hospital F/U      [place] Medical Record Number:  [id number]  Place of Service where encounter took place:  [facility] (U) [[id number]]    Clarke Moreira  is a 74 year old  (1950)  diabetes mellitus type II, chronic kidney disease, heart failure with preserved ejection fraction, atrial fibrillation, depression, obstructive sleep apnea, chronic anemia, hypothyroidism, hyperlipidemia and BPH.  He had a [facility]. Hospital stay 10/25/2024 through 11/2/2024 for weakness.  He then had a transitional care unit stay at [facility] October 3 to November 27, 2024 when he was discharged home.  Unfortunately he had a fall on 12/7/2024 therefore reported to the emergency department via EMS for evaluation.  Etiology of fall is unclear but potentially due to hypotension therefore tamsulosin was discontinued while in the hospital.  He had x-rays of the knee and ankle given pain, both were negative.  Appears that the rest of the hospital stay was unremarkable.  Transitional care unit was recommended.he admitted to the above facility from  [facility]. Hospital stay 12/7/2024 through 12/14/2024..     Clarke was visited today while in his room sitting in the wheelchair.  He feels that he is stronger than what he was on his last admission here to the transitional care unit so he is hopeful that he will be able to surpass the functioning where he was at when he discharged from [facility] a few weeks ago.  He has not had any chest pain or shortness of breath.  Having regular bowel movements with last 1 this morning.  He is urinating without discomfort.  Maintains on the 60 mg of torsemide and he does not feel that he has increased lower extremity  edema.  Appetite has been good.    Today we discussed potentially moving to an assisted living as he is not thriving in his home environment and that it would be helpful for him to have assistance with medications and meals, etc..  He agrees that assisted living would be appropriate for him and he is open to looking at some.  Social work connected with him today about this.    CODE STATUS/ADVANCE DIRECTIVES DISCUSSION:  Prior  DNR / DNI  ALLERGIES:   Allergies   Allergen Reactions    Seasonal Allergies       PAST MEDICAL HISTORY:   Past Medical History:   Diagnosis Date    Atrial fibrillation (H)     Basal cell carcinoma     Chronic anticoagulation     Diastolic heart failure (H)     Dyslipidemia     Essential hypertension     Morbid obesity (H)     Sleep-disordered breathing     Type 2 diabetes mellitus (H)       PAST SURGICAL HISTORY:   has a past surgical history that includes biopsy of skin lesion; Mohs micrographic procedure; and picc insertion (Right, 09/24/2017).  FAMILY HISTORY: family history includes Depression in his mother; Glaucoma in his maternal grandfather.  SOCIAL HISTORY:   reports that he has never smoked. He has never used smokeless tobacco. He reports that he does not drink alcohol and does not use drugs.  Patient's living condition: lives alone    Post Discharge Medication Reconciliation Status:   MED REC REQUIRED  Post Medication Reconciliation Status: discharge medications reconciled, continue medications without change       Current Outpatient Medications   Medication Sig Dispense Refill    acetaminophen (TYLENOL) 325 MG tablet Take 2 tablets (650 mg) by mouth every 4 hours as needed for mild pain or other (and adjunct with moderate or severe pain or per patient request).      aspirin (ASA) 81 MG chewable tablet Take 1 tablet (81 mg) by mouth daily. 200 tablet 2    atorvastatin (LIPITOR) 40 MG tablet Take 1 tablet (40 mg) by mouth daily 90 tablet 3    CERTAVITE/ANTIOXIDANTS tablet TAKE ONE  TABLET BY MOUTH ONE TIME DAILY 100 tablet 0    ferrous sulfate (FEROSUL) 325 (65 Fe) MG tablet Take 1 tablet (325 mg) by mouth every other day. For iron deficiency anemia 60 tablet 1    levothyroxine (SYNTHROID/LEVOTHROID) 175 MCG tablet Take 1 tablet (175 mcg) by mouth every morning (before breakfast) 90 tablet 3    magnesium oxide (MAG-OX) 400 MG tablet Take 1 tablet (400 mg) by mouth daily. 30 tablet 0    metFORMIN (GLUCOPHAGE) 500 MG tablet Take 1 tablet (500 mg) by mouth 2 times daily (with meals). 180 tablet 3    metoprolol succinate ER (TOPROL XL) 100 MG 24 hr tablet Take 1 tablet (100 mg) by mouth daily.      Omega-3 Fatty Acids (EQL FISH OIL) 1000 MG CAPS Take 1 capsule by mouth daily 90 capsule 0    order for DME Equipment being ordered: Diabetes Shoes 1 Units 0    order for DME Equipment being ordered: Custom diabetic shoes 1 Units 0    order for DME Equipment being ordered: Bariatric Lift chair  Diagnosis - morbid obesity, CHF, Lymphedema    Fax to Ne from [employer] at [phone]. 1 Units 0    order for DME Equipment being ordered: Other: Velcro Compression stockings.   Treatment Diagnosis: bilateral lymphedema. 2 each 0    potassium chloride chen ER (KLOR-CON M20) 20 MEQ CR tablet Take 2 tablets (40 mEq) by mouth 2 times daily.      senna-docusate (SENEXON-S) 8.6-50 MG tablet Take 1 to 2 tablets by mouth 2 times daily as needed for constipation 180 tablet 0    spironolactone (ALDACTONE) 50 MG tablet Take 1 tablet (50 mg) by mouth daily.      torsemide (DEMADEX) 20 MG tablet Take 60 mg by mouth daily.      urea (CARMOL) 10 % external lotion Apply topically 2 times daily as needed for dry skin.      VITAMIN A PO Take 1 tablet by mouth daily.      vitamin B complex with vitamin C (VITAMIN  B COMPLEX) tablet Take 1 tablet by mouth daily 100 tablet 3    vitamin C (ASCORBIC ACID) 1000 MG TABS Take 1,000 mg by mouth daily      Vitamin D, Cholecalciferol, 25 MCG (1000 UT) CAPS Take 1 capsule by mouth  "daily.      vitamin E (VITAMIN E COMPLEX) 1000 units (450 mg) capsule Take 1 capsule (1,000 Units) by mouth daily 100 capsule 3    warfarin ANTICOAGULANT (COUMADIN) 3 MG tablet Take by mouth 8 mg (5 mg x 1 tablet and 3 mg x 1 tablet) every day OR as directed by INR clinic 90 tablet 1    warfarin ANTICOAGULANT (COUMADIN) 5 MG tablet Take by mouth 8 mg (5 mg x 1 tablet and 3 mg x 1 tablet) every day OR as directed by INR clinic 90 tablet 1     No current facility-administered medications for this visit.       ROS:  10 point ROS of systems including Constitutional, Eyes, Respiratory, Cardiovascular, Gastroenterology, Genitourinary, Integumentary, Musculoskeletal, Psychiatric were all negative except for pertinent positives noted in my HPI.    Vitals:  BP (!) 143/68   Pulse 90   Temp 98.2  F (36.8  C)   Resp 16   Ht 1.803 m (5' 11\")   Wt (!) 155.7 kg (343 lb 4.8 oz)   SpO2 98%   BMI 47.88 kg/m    Exam:  GENERAL APPEARANCE:  Alert, in no distress, pleasant, cooperative, oriented x 4  EYES: no discharge or mattering on lids or lashes noted  ENT:  moist mucous membranes, hearing acuity intact  NECK: supple, symmetrical  RESP: no respiratory distress, Lung sounds clear, patient is on room air  CV:  rate and rhythm regular, no murmur. Edema trace in bilateral lower extremities, teds are in place. VASCULAR: warm extremities without open areas.  ABDOMEN: obese, normal bowel sounds, soft, nontender.  M/S:   Gait and station: unsafe without assistance. no tenderness or swelling of the joints; able to move all extremities   SKIN:  Inspection and palpation of skin and subcutaneous tissue: skin warm, dry and intact without rashes  NEURO: no facial asymmetry, no speech deficits and able to follow directions, moves all extremities symmetrically  PSYCH:  insight and judgement intact, memory intact, affect and mood normal    Lab/Diagnostic data:  CBC RESULTS:   Recent Labs   Lab Test 12/08/24 0652 12/07/24  1419   WBC 7.5 10.7 "   RBC 4.52 4.91   HGB 11.3* 11.7*   HCT 35.6* 38.8*   MCV 79 79   MCH 25.0* 23.8*   MCHC 31.7 30.2*   RDW 18.8* 18.9*    342       Last Basic Metabolic Panel:  Recent Labs   Lab Test 12/14/24  1407 12/14/24  0756 12/14/24  0605 12/13/24  0846 12/13/24  0735 12/10/24  0854 12/10/24  0504 12/08/24  0759 12/08/24  0652   NA  --   --   --   --   --   --  141  --  136   POTASSIUM  --   --  4.0  --  3.9   < > 4.3   < > 2.7*   CHLORIDE  --   --   --   --   --   --  102  --  97*   CHERI  --   --   --   --   --   --  9.0  --  8.9   CO2  --   --   --   --   --   --  31*  --  27   BUN  --   --   --   --   --   --  10.8  --  10.1   CR  --   --   --   --   --   --  1.01  --  0.90   * 129*  --    < >  --    < > 96   < > 102*    < > = values in this interval not displayed.       Liver Function Studies -   Recent Labs   Lab Test 12/08/24  0652 12/07/24  1419   PROTTOTAL 6.1* 6.7   ALBUMIN 3.1* 3.6   BILITOTAL 0.4 0.4   ALKPHOS 93 103   AST 20 22   ALT 13 14       TSH   Date Value Ref Range Status   10/07/2024 0.93 0.30 - 4.20 uIU/mL Final   09/22/2024 0.79 0.30 - 4.20 uIU/mL Final   05/09/2022 3.98 0.40 - 4.00 mU/L Final   11/09/2020 2.69 0.40 - 4.00 mU/L Final   11/05/2019 0.75 0.40 - 4.00 mU/L Final   ]    Lab Results   Component Value Date    A1C 6.2 10/07/2024    A1C 6.5 09/26/2024    A1C 6.2 05/06/2021    A1C 5.9 11/09/2020       ASSESSMENT/PLAN:  Lower back pain    Bilateral knee hip pain   Has bilateral knee pain.  Did have several falls at home due to his knees buckling. Weakness.  -- Continue Voltaren gel 1%.  Apply 2 g to bilateral knees 3 times daily.  -- Ongoing PT OT for strengthening  -- tylenol as needed.      HFpEF  Hypertensive heart disease   Echo  10/25:  EF 55-60%.  Torsemide decreased to 60 mg during last TCU stay and this is the dose he remains on without evidence of increased lower extremity edema, shortness of breath or increased weight.  He feels very comfortable with this diuretic  dose.  --Continue with spironolactone 50 mg daily  --Torsemide 60 mg daily.   -- Continue potassium chloride to 40 mg BID  --Continue to monitor blood pressure and heart rate, adjust medications as needed.     Paroxysmal A fib  Long term anticoagulation   CHADSVASC 5. INR  2.33 today.   -- Continue with metoprolol  mg daily.   --coumadin 10 mg daily and recheck INR on 12/20.     Chronic hypercapnic respiratory failure   THONG  --continue with CPAP however he does not have it at the TCU and there is currently no way for him to get it from his home.      CKD III:   baseline Cr around 1.   --Avoid nephrotoxic medications. Recheck BMP 12/20     T2DM with neuropathy  obesity   A1c 6.2 10/7. Body mass index is 45.82 kg/m .  -  Metformin 500 mg bid     HLD  - Continue ASA and statin     Depression  anxiety  Does have some anxiety at times but is comfortable here at the TCU since he had a several week stay here in November.  We discussed moving to an assisted living today and he is on board with that so would like to get social work involved to help plan for the.     Chronic microcytic anemia  baseline  hgb 10.5-12. Iron low to 14 9/22/2024. HGB 11.3 at hospital  -Continue iron supplement daily  - may need out patient  GI work up       Hypothyroidism:   TSH 0.93 10/7/24  -- Continue synthroid      BPH  Tamsulosin was stopped during hospital stay.   --follow clinically     Physical deconditioning  Secondary to recent hospitalization and underlying medical conditions.   --ongoing PT/OT for strengthening.   -- Social work for discharge planning    Electronically signed by:  REAGAN Crandall CNP

## 2024-12-16 NOTE — PLAN OF CARE
Occupational Therapy Discharge Summary    Reason for therapy discharge:    Discharged to transitional care facility.    Progress towards therapy goal(s). See goals on Care Plan in [facility] electronic health record for goal details.  Goals partially met.  Barriers to achieving goals:   discharge from facility.    Therapy recommendation(s):    Continued therapy is recommended.  Rationale/Recommendations:  to maximize safety and I with ADL.

## 2024-12-16 NOTE — TELEPHONE ENCOUNTER
"Provider: REAGAN Ugarte CNP   Facility: [facility]   Facility Type:  TCU    Caller: Milana  Call Back Number: [phone]  Reason for call: INR  Diagnosis/Goal: A. Fib  Heparin/Lovenox:  No  Currently on ABX?: No  Other interacting medication:  ASA, Vitamin E  Missed or refused doses: No    Date INR Previous Dose/Orders        12/14 165 Hospital discharge orders said for patient to get 8mg, however the order was transcribed incorrectly and patient received 15mg.     12/15 2.6 8mg   12/16 2.33      Prior to hospitalization dosing:  Warfarin 8mg daily.        Telephone encounter sent to: REAGAN Ugarte CNP     Requesting orders for Warfarin dosing and date of next INR draw  Please respond to \"Geriatrics Nurse Pool\".    Juan Pablo Ashford RN   "

## 2024-12-16 NOTE — PROGRESS NOTES
ANTICOAGULATION  MANAGEMENT: Discharge Review    Clarke Moreira chart reviewed for anticoagulation continuity of care    Hospital Admission on 12/07-12/14 for weakness, hypokalemia, hyponatremia falls, hypophosphatemia; Chronic heart failure.    Discharge disposition: TCU  [facility] on [facility] 14    [phone]     Results:    Recent labs: (last 7 days)     12/10/24  0504 12/11/24  0657 12/12/24  0642 12/13/24  0735 12/14/24  0605 12/16/24  0608   INR 1.77* 1.61* 1.51* 1.53* 1.65* 2.33*     Anticoagulation inpatient management:         anticoagulation calendar updated with inpatient dosing    Anticoagulation discharge instructions:     Warfarin dosing: home regimen continued   Bridging: No   INR goal change: No      Medication changes affecting anticoagulation: No    Additional factors affecting anticoagulation: Yes: Mag ox started     PLAN     No adjustment to anticoagulation plan needed    Patient not contacted    Anticoagulation Calendar updated    Cathleen Gomes RN  12/16/2024  Anticoagulation Clinic  [facility] for routing messages: benjamin ALBRIGHT  [facility] patient phone line: [phone]

## 2024-12-16 NOTE — LETTER
12/16/2024      Clarke Moreira  [address]  [facility] [address]        [facility] GERIATRICS    PRIMARY CARE PROVIDER AND CLINIC:  Henrry Shepard MD, [address] / [facility] [address]  Chief Complaint   Patient presents with     Hospital F/U      [place] Medical Record Number:  [id number]  Place of Service where encounter took place:  [facility] ([facility]) [[id number]]    Clarke Moreira  is a 74 year old  (1950)  diabetes mellitus type II, chronic kidney disease, heart failure with preserved ejection fraction, atrial fibrillation, depression, obstructive sleep apnea, chronic anemia, hypothyroidism, hyperlipidemia and BPH.  He had a [facility]. Hospital stay 10/25/2024 through 11/2/2024 for weakness.  He then had a transitional care unit stay at [facility] October 3 to November 27, 2024 when he was discharged home.  Unfortunately he had a fall on 12/7/2024 therefore reported to the emergency department via EMS for evaluation.  Etiology of fall is unclear but potentially due to hypotension therefore tamsulosin was discontinued while in the hospital.  He had x-rays of the knee and ankle given pain, both were negative.  Appears that the rest of the hospital stay was unremarkable.  Transitional care unit was recommended.he admitted to the above facility from  [facility]. Hospital stay 12/7/2024 through 12/14/2024..     Clarke was visited today while in his room sitting in the wheelchair.  He feels that he is stronger than what he was on his last admission here to the transitional care unit so he is hopeful that he will be able to surpass the functioning where he was at when he discharged from [facility] a few weeks ago.  He has not had any chest pain or shortness of breath.  Having regular bowel movements with last 1 this morning.  He is urinating without discomfort.   Maintains on the 60 mg of torsemide and he does not feel that he has increased lower extremity edema.  Appetite has been good.    Today we discussed potentially moving to an assisted living as he is not thriving in his home environment and that it would be helpful for him to have assistance with medications and meals, etc..  He agrees that assisted living would be appropriate for him and he is open to looking at some.  Social work connected with him today about this.    CODE STATUS/ADVANCE DIRECTIVES DISCUSSION:  Prior  DNR / DNI  ALLERGIES:   Allergies   Allergen Reactions     Seasonal Allergies       PAST MEDICAL HISTORY:   Past Medical History:   Diagnosis Date     Atrial fibrillation (H)      Basal cell carcinoma      Chronic anticoagulation      Diastolic heart failure (H)      Dyslipidemia      Essential hypertension      Morbid obesity (H)      Sleep-disordered breathing      Type 2 diabetes mellitus (H)       PAST SURGICAL HISTORY:   has a past surgical history that includes biopsy of skin lesion; Mohs micrographic procedure; and picc insertion (Right, 09/24/2017).  FAMILY HISTORY: family history includes Depression in his mother; Glaucoma in his maternal grandfather.  SOCIAL HISTORY:   reports that he has never smoked. He has never used smokeless tobacco. He reports that he does not drink alcohol and does not use drugs.  Patient's living condition: lives alone    Post Discharge Medication Reconciliation Status:   MED REC REQUIRED  Post Medication Reconciliation Status: discharge medications reconciled, continue medications without change       Current Outpatient Medications   Medication Sig Dispense Refill     acetaminophen (TYLENOL) 325 MG tablet Take 2 tablets (650 mg) by mouth every 4 hours as needed for mild pain or other (and adjunct with moderate or severe pain or per patient request).       aspirin (ASA) 81 MG chewable tablet Take 1 tablet (81 mg) by mouth daily. 200 tablet 2     atorvastatin  (LIPITOR) 40 MG tablet Take 1 tablet (40 mg) by mouth daily 90 tablet 3     CERTAVITE/ANTIOXIDANTS tablet TAKE ONE TABLET BY MOUTH ONE TIME DAILY 100 tablet 0     ferrous sulfate (FEROSUL) 325 (65 Fe) MG tablet Take 1 tablet (325 mg) by mouth every other day. For iron deficiency anemia 60 tablet 1     levothyroxine (SYNTHROID/LEVOTHROID) 175 MCG tablet Take 1 tablet (175 mcg) by mouth every morning (before breakfast) 90 tablet 3     magnesium oxide (MAG-OX) 400 MG tablet Take 1 tablet (400 mg) by mouth daily. 30 tablet 0     metFORMIN (GLUCOPHAGE) 500 MG tablet Take 1 tablet (500 mg) by mouth 2 times daily (with meals). 180 tablet 3     metoprolol succinate ER (TOPROL XL) 100 MG 24 hr tablet Take 1 tablet (100 mg) by mouth daily.       Omega-3 Fatty Acids (EQL FISH OIL) 1000 MG CAPS Take 1 capsule by mouth daily 90 capsule 0     order for DME Equipment being ordered: Diabetes Shoes 1 Units 0     order for DME Equipment being ordered: Custom diabetic shoes 1 Units 0     order for DME Equipment being ordered: Bariatric Lift chair  Diagnosis - morbid obesity, CHF, Lymphedema    Fax to Ne from [employer] at [phone]. 1 Units 0     order for DME Equipment being ordered: Other: Velcro Compression stockings.   Treatment Diagnosis: bilateral lymphedema. 2 each 0     potassium chloride chen ER (KLOR-CON M20) 20 MEQ CR tablet Take 2 tablets (40 mEq) by mouth 2 times daily.       senna-docusate (SENEXON-S) 8.6-50 MG tablet Take 1 to 2 tablets by mouth 2 times daily as needed for constipation 180 tablet 0     spironolactone (ALDACTONE) 50 MG tablet Take 1 tablet (50 mg) by mouth daily.       torsemide (DEMADEX) 20 MG tablet Take 60 mg by mouth daily.       urea (CARMOL) 10 % external lotion Apply topically 2 times daily as needed for dry skin.       VITAMIN A PO Take 1 tablet by mouth daily.       vitamin B complex with vitamin C (VITAMIN  B COMPLEX) tablet Take 1 tablet by mouth daily 100 tablet 3     vitamin C  "(ASCORBIC ACID) 1000 MG TABS Take 1,000 mg by mouth daily       Vitamin D, Cholecalciferol, 25 MCG (1000 UT) CAPS Take 1 capsule by mouth daily.       vitamin E (VITAMIN E COMPLEX) 1000 units (450 mg) capsule Take 1 capsule (1,000 Units) by mouth daily 100 capsule 3     warfarin ANTICOAGULANT (COUMADIN) 3 MG tablet Take by mouth 8 mg (5 mg x 1 tablet and 3 mg x 1 tablet) every day OR as directed by INR clinic 90 tablet 1     warfarin ANTICOAGULANT (COUMADIN) 5 MG tablet Take by mouth 8 mg (5 mg x 1 tablet and 3 mg x 1 tablet) every day OR as directed by INR clinic 90 tablet 1     No current facility-administered medications for this visit.       ROS:  10 point ROS of systems including Constitutional, Eyes, Respiratory, Cardiovascular, Gastroenterology, Genitourinary, Integumentary, Musculoskeletal, Psychiatric were all negative except for pertinent positives noted in my HPI.    Vitals:  BP (!) 143/68   Pulse 90   Temp 98.2  F (36.8  C)   Resp 16   Ht 1.803 m (5' 11\")   Wt (!) 155.7 kg (343 lb 4.8 oz)   SpO2 98%   BMI 47.88 kg/m    Exam:  GENERAL APPEARANCE:  Alert, in no distress, pleasant, cooperative, oriented x 4  EYES: no discharge or mattering on lids or lashes noted  ENT:  moist mucous membranes, hearing acuity intact  NECK: supple, symmetrical  RESP: no respiratory distress, Lung sounds clear, patient is on room air  CV:  rate and rhythm regular, no murmur. Edema trace in bilateral lower extremities, teds are in place. VASCULAR: warm extremities without open areas.  ABDOMEN: obese, normal bowel sounds, soft, nontender.  M/S:   Gait and station: unsafe without assistance. no tenderness or swelling of the joints; able to move all extremities   SKIN:  Inspection and palpation of skin and subcutaneous tissue: skin warm, dry and intact without rashes  NEURO: no facial asymmetry, no speech deficits and able to follow directions, moves all extremities symmetrically  PSYCH:  insight and judgement intact, " memory intact, affect and mood normal    Lab/Diagnostic data:  CBC RESULTS:   Recent Labs   Lab Test 12/08/24 0652 12/07/24  1419   WBC 7.5 10.7   RBC 4.52 4.91   HGB 11.3* 11.7*   HCT 35.6* 38.8*   MCV 79 79   MCH 25.0* 23.8*   MCHC 31.7 30.2*   RDW 18.8* 18.9*    342       Last Basic Metabolic Panel:  Recent Labs   Lab Test 12/14/24  1407 12/14/24  0756 12/14/24  0605 12/13/24  0846 12/13/24  0735 12/10/24  0854 12/10/24  0504 12/08/24  0759 12/08/24  0652   NA  --   --   --   --   --   --  141  --  136   POTASSIUM  --   --  4.0  --  3.9   < > 4.3   < > 2.7*   CHLORIDE  --   --   --   --   --   --  102  --  97*   CHERI  --   --   --   --   --   --  9.0  --  8.9   CO2  --   --   --   --   --   --  31*  --  27   BUN  --   --   --   --   --   --  10.8  --  10.1   CR  --   --   --   --   --   --  1.01  --  0.90   * 129*  --    < >  --    < > 96   < > 102*    < > = values in this interval not displayed.       Liver Function Studies -   Recent Labs   Lab Test 12/08/24 0652 12/07/24  1419   PROTTOTAL 6.1* 6.7   ALBUMIN 3.1* 3.6   BILITOTAL 0.4 0.4   ALKPHOS 93 103   AST 20 22   ALT 13 14       TSH   Date Value Ref Range Status   10/07/2024 0.93 0.30 - 4.20 uIU/mL Final   09/22/2024 0.79 0.30 - 4.20 uIU/mL Final   05/09/2022 3.98 0.40 - 4.00 mU/L Final   11/09/2020 2.69 0.40 - 4.00 mU/L Final   11/05/2019 0.75 0.40 - 4.00 mU/L Final   ]    Lab Results   Component Value Date    A1C 6.2 10/07/2024    A1C 6.5 09/26/2024    A1C 6.2 05/06/2021    A1C 5.9 11/09/2020       ASSESSMENT/PLAN:  Lower back pain    Bilateral knee hip pain   Has bilateral knee pain.  Did have several falls at home due to his knees buckling. Weakness.  -- Continue Voltaren gel 1%.  Apply 2 g to bilateral knees 3 times daily.  -- Ongoing PT OT for strengthening  -- tylenol as needed.      HFpEF  Hypertensive heart disease   Echo  10/25:  EF 55-60%.  Torsemide decreased to 60 mg during last TCU stay and this is the dose he remains on without  evidence of increased lower extremity edema, shortness of breath or increased weight.  He feels very comfortable with this diuretic dose.  --Continue with spironolactone 50 mg daily  --Torsemide 60 mg daily.   -- Continue potassium chloride to 40 mg BID  --Continue to monitor blood pressure and heart rate, adjust medications as needed.     Paroxysmal A fib  Long term anticoagulation   CHADSVASC 5. INR  2.33 today.   -- Continue with metoprolol  mg daily.   --coumadin 10 mg daily and recheck INR on 12/20.     Chronic hypercapnic respiratory failure   THONG  --continue with CPAP however he does not have it at the TCU and there is currently no way for him to get it from his home.      CKD III:   baseline Cr around 1.   --Avoid nephrotoxic medications. Recheck BMP 12/20     T2DM with neuropathy  obesity   A1c 6.2 10/7. Body mass index is 45.82 kg/m .  -  Metformin 500 mg bid     HLD  - Continue ASA and statin     Depression  anxiety  Does have some anxiety at times but is comfortable here at the TCU since he had a several week stay here in November.  We discussed moving to an assisted living today and he is on board with that so would like to get social work involved to help plan for the.     Chronic microcytic anemia  baseline  hgb 10.5-12. Iron low to 14 9/22/2024. HGB 11.3 at hospital  -Continue iron supplement daily  - may need out patient  GI work up       Hypothyroidism:   TSH 0.93 10/7/24  -- Continue synthroid      BPH  Tamsulosin was stopped during hospital stay.   --follow clinically     Physical deconditioning  Secondary to recent hospitalization and underlying medical conditions.   --ongoing PT/OT for strengthening.   -- Social work for discharge planning    Electronically signed by:  REAGAN Crandall CNP                   Sincerely,        REAGAN Crandall CNP

## 2024-12-17 ENCOUNTER — DOCUMENTATION ONLY (OUTPATIENT)
Dept: FAMILY MEDICINE | Facility: CLINIC | Age: 74
End: 2024-12-17
Payer: COMMERCIAL

## 2024-12-17 LAB
QUANTIFERON MITOGEN: 10 IU/ML
QUANTIFERON NIL TUBE: 0.03 IU/ML

## 2024-12-17 NOTE — PROGRESS NOTES
"When opening a documentation only encounter, be sure to enter in \"Chief Complaint\" Forms and in \" Comments\" Title of form, description if needed.    Angel is a 74 year old  male  Form received via: Fax  Form now resides in: Provider Nabil Schwartz MA               "

## 2024-12-18 LAB
GAMMA INTERFERON BACKGROUND BLD IA-ACNC: 0.03 IU/ML
M TB IFN-G BLD-IMP: POSITIVE
M TB IFN-G CD4+ BCKGRND COR BLD-ACNC: 9.97 IU/ML
MITOGEN IGNF BCKGRD COR BLD-ACNC: 0.31 IU/ML
MITOGEN IGNF BCKGRD COR BLD-ACNC: 0.43 IU/ML
QUANTIFERON TB1 TUBE: 0.34 IU/ML
QUANTIFERON TB2 TUBE: 0.46

## 2024-12-18 NOTE — PROGRESS NOTES
[facility] GERIATRICS    PRIMARY CARE PROVIDER AND CLINIC:  Henrry Shepard MD, [address] [address]  Chief Complaint   Patient presents with    Hospital F/U      [facility] Medical Record Number:  [id number]  Place of Service where encounter took place:  [facility]     Clarke Moreira  is a 74 year old  (1950), re-admitted to the above facility from  [facility]. Hospital stay 12/7/24 - 12/14/24. .     Hospital course was reviewed by me, is as per the hospital discharge summary and NP note    Patient is a past medical history diabetes mellitus type 2, chronic kidney disease, heart failure, atrial fibrillation, sleep apnea, chronic anemia.  He was previously discharged from this facility after hospitalization for weakness, bilateral knee pain.  He was discharged to home on November 27, 2024  Pt was readmitted to the hospital after falling at home, possibly related to hypotension, tamsulosin was discontinued.  Noted worsening right greater than left knee pain.  X-rays were unremarkable.    Patient states he is feeling stronger, notes that his function is improved now compared to when he was discharged from the nursing home several weeks ago.  He is considering discharging to assisted living given his increased care needs and recent hospitalizations.    He denies cough, chest pain, shortness of breath, dizziness.  Chronic lower extremity edema is at baseline.    CODE STATUS/ADVANCE DIRECTIVES DISCUSSION:  Prior  DNR / DNI  ALLERGIES:   Allergies   Allergen Reactions    Seasonal Allergies       PAST MEDICAL HISTORY:   Past Medical History:   Diagnosis Date    Atrial fibrillation (H)     Basal cell carcinoma     Chronic anticoagulation     Diastolic heart failure (H)     Dyslipidemia     Essential hypertension     Morbid obesity (H)     Sleep-disordered breathing     Type 2 diabetes mellitus (H)       PAST SURGICAL  HISTORY:   has a past surgical history that includes biopsy of skin lesion; Mohs micrographic procedure; and picc insertion (Right, 09/24/2017).  FAMILY HISTORY: family history includes Depression in his mother; Glaucoma in his maternal grandfather.  SOCIAL HISTORY:   reports that he has never smoked. He has never used smokeless tobacco. He reports that he does not drink alcohol and does not use drugs.  Patient's living condition: lives alone    Current medications reviewed by me today      Current Outpatient Medications   Medication Sig Dispense Refill    acetaminophen (TYLENOL) 325 MG tablet Take 2 tablets (650 mg) by mouth every 4 hours as needed for mild pain or other (and adjunct with moderate or severe pain or per patient request).      aspirin (ASA) 81 MG chewable tablet Take 1 tablet (81 mg) by mouth daily. 200 tablet 2    atorvastatin (LIPITOR) 40 MG tablet Take 1 tablet (40 mg) by mouth daily 90 tablet 3    CERTAVITE/ANTIOXIDANTS tablet TAKE ONE TABLET BY MOUTH ONE TIME DAILY 100 tablet 0    ferrous sulfate (FEROSUL) 325 (65 Fe) MG tablet Take 1 tablet (325 mg) by mouth every other day. For iron deficiency anemia 60 tablet 1    levothyroxine (SYNTHROID/LEVOTHROID) 175 MCG tablet Take 1 tablet (175 mcg) by mouth every morning (before breakfast) 90 tablet 3    magnesium oxide (MAG-OX) 400 MG tablet Take 1 tablet (400 mg) by mouth daily. 30 tablet 0    metFORMIN (GLUCOPHAGE) 500 MG tablet Take 1 tablet (500 mg) by mouth 2 times daily (with meals). 180 tablet 3    metoprolol succinate ER (TOPROL XL) 100 MG 24 hr tablet Take 1 tablet (100 mg) by mouth daily.      Omega-3 Fatty Acids (EQL FISH OIL) 1000 MG CAPS Take 1 capsule by mouth daily 90 capsule 0    order for DME Equipment being ordered: Diabetes Shoes 1 Units 0    order for DME Equipment being ordered: Custom diabetic shoes 1 Units 0    order for DME Equipment being ordered: Bariatric Lift chair  Diagnosis - morbid obesity, CHF, Lymphedema    Fax to  "Ne from [employer] at [phone]. 1 Units 0    order for DME Equipment being ordered: Other: Velcro Compression stockings.   Treatment Diagnosis: bilateral lymphedema. 2 each 0    potassium chloride chen ER (KLOR-CON M20) 20 MEQ CR tablet Take 2 tablets (40 mEq) by mouth 2 times daily.      senna-docusate (SENEXON-S) 8.6-50 MG tablet Take 1 to 2 tablets by mouth 2 times daily as needed for constipation 180 tablet 0    spironolactone (ALDACTONE) 50 MG tablet Take 1 tablet (50 mg) by mouth daily.      torsemide (DEMADEX) 20 MG tablet Take 60 mg by mouth daily.      urea (CARMOL) 10 % external lotion Apply topically 2 times daily as needed for dry skin.      VITAMIN A PO Take 1 tablet by mouth daily.      vitamin B complex with vitamin C (VITAMIN  B COMPLEX) tablet Take 1 tablet by mouth daily 100 tablet 3    vitamin C (ASCORBIC ACID) 1000 MG TABS Take 1,000 mg by mouth daily      Vitamin D, Cholecalciferol, 25 MCG (1000 UT) CAPS Take 1 capsule by mouth daily.      vitamin E (VITAMIN E COMPLEX) 1000 units (450 mg) capsule Take 1 capsule (1,000 Units) by mouth daily 100 capsule 3    warfarin ANTICOAGULANT (COUMADIN) 3 MG tablet Take by mouth 8 mg (5 mg x 1 tablet and 3 mg x 1 tablet) every day OR as directed by INR clinic 90 tablet 1    warfarin ANTICOAGULANT (COUMADIN) 5 MG tablet Take by mouth 8 mg (5 mg x 1 tablet and 3 mg x 1 tablet) every day OR as directed by INR clinic 90 tablet 1     No current facility-administered medications for this visit.       ROS:  10 point ROS of systems including Constitutional, Eyes, Respiratory, Cardiovascular, Gastroenterology, Genitourinary, Integumentary, Musculoskeletal, Psychiatric were all negative except for pertinent positives noted in my HPI.    Vitals:  /72   Pulse 82   Temp 97.8  F (36.6  C)   Resp 18   Ht 1.803 m (5' 11\")   Wt (!) 158.8 kg (350 lb)   SpO2 95%   BMI 48.82 kg/m    Exam:  Very pleasant, obese male, sitting in chair in his room  He is in good " spirits, appears comfortable  Lungs clear  CV irregular, heart rate 80s  Abdomen soft, obese  Extremities: Both lower extremities are wrapped.  Nonpitting edema.  No warmth or swelling right knee  Neuro: Nonfocal.  Fully oriented    Lab/Diagnostic data:  Most Recent 3 CBC's:  Recent Labs   Lab Test 12/08/24  0652 12/07/24  1419 11/05/24  0517   WBC 7.5 10.7 7.3   HGB 11.3* 11.7* 11.5*   MCV 79 79 76*    342 332     Most Recent 3 BMP's:  Recent Labs   Lab Test 12/14/24  1407 12/14/24  0756 12/14/24  0605 12/13/24  1747 12/13/24  0846 12/13/24  0735 12/12/24  0809 12/12/24  0642 12/10/24  0854 12/10/24  0504 12/08/24  0759 12/08/24  0652 12/07/24  2026 12/07/24  1419   NA  --   --   --   --   --   --   --   --   --  141  --  136  --  133*   POTASSIUM  --   --  4.0  --   --  3.9  --  4.3   < > 4.3   < > 2.7*   < > 2.6*   CHLORIDE  --   --   --   --   --   --   --   --   --  102  --  97*  --  92*   CO2  --   --   --   --   --   --   --   --   --  31*  --  27  --  25   BUN  --   --   --   --   --   --   --   --   --  10.8  --  10.1  --  14.0   CR  --   --   --   --   --   --   --   --   --  1.01  --  0.90  --  1.21*   ANIONGAP  --   --   --   --   --   --   --   --   --  8  --  12  --  16*   CHERI  --   --   --   --   --   --   --   --   --  9.0  --  8.9  --  9.1   * 129*  --  138*   < >  --    < >  --    < > 96   < > 102*  --  152*    < > = values in this interval not displayed.     Most Recent 2 LFT's:  Recent Labs   Lab Test 12/08/24  0652 12/07/24  1419   AST 20 22   ALT 13 14   ALKPHOS 93 103   BILITOTAL 0.4 0.4     Most Recent TSH and T4:  Recent Labs   Lab Test 10/07/24  1149 03/12/19  0625 12/11/18  1125   TSH 0.93   < > 11.20*   T4  --   --  1.21    < > = values in this interval not displayed.     Most Recent Hemoglobin A1c:  Recent Labs   Lab Test 10/07/24  1149   A1C 6.2*       ASSESSMENT/PLAN:    Fall with acute on chronic bilateral knee discomfort  No acute injury noted during  hospitalization  Plan: Therapies.  Voltaren gel has been ordered.  Tylenol as needed    Hypertension with preserved ejection fraction  Chronic lower extremity edema  History of PAF.  Heart rhythm irregular, heart rate controlled  Plan: Continue torsemide 60 mg daily, spironolactone 50 mg daily, monitor blood pressure, BMP  Continue metoprolol, chronic warfarin therapy    History of chronic hypercapnic respiratory failure related to obesity, sleep apnea  Plan: CPAP    Diabetes mellitus type 2  Stable on metformin 500 mg twice daily  Plan: Periodic hemoglobin A1c monitoring    Hypothyroidism  Stable on levothyroxine    BPH  No acute urinary symptoms.  Tamsulosin was discontinued for concern for hypotension  Plan: Monitor voiding.  Consider resumption at night, if worsening symptoms, with close monitoring of blood pressure      Luis Nova MD

## 2024-12-19 ENCOUNTER — TRANSITIONAL CARE UNIT VISIT (OUTPATIENT)
Dept: GERIATRICS | Facility: CLINIC | Age: 74
End: 2024-12-19
Payer: COMMERCIAL

## 2024-12-19 ENCOUNTER — LAB REQUISITION (OUTPATIENT)
Dept: LAB | Facility: CLINIC | Age: 74
End: 2024-12-19
Payer: COMMERCIAL

## 2024-12-19 VITALS
WEIGHT: 315 LBS | SYSTOLIC BLOOD PRESSURE: 117 MMHG | OXYGEN SATURATION: 95 % | HEIGHT: 71 IN | HEART RATE: 82 BPM | BODY MASS INDEX: 44.1 KG/M2 | RESPIRATION RATE: 18 BRPM | DIASTOLIC BLOOD PRESSURE: 72 MMHG | TEMPERATURE: 97.8 F

## 2024-12-19 DIAGNOSIS — I50.32 CHRONIC HEART FAILURE WITH PRESERVED EJECTION FRACTION (H): ICD-10-CM

## 2024-12-19 DIAGNOSIS — M25.561 CHRONIC PAIN OF BOTH KNEES: ICD-10-CM

## 2024-12-19 DIAGNOSIS — I10 ESSENTIAL (PRIMARY) HYPERTENSION: ICD-10-CM

## 2024-12-19 DIAGNOSIS — R29.6 FALLS FREQUENTLY: Primary | ICD-10-CM

## 2024-12-19 DIAGNOSIS — I48.20 CHRONIC ATRIAL FIBRILLATION (H): ICD-10-CM

## 2024-12-19 DIAGNOSIS — M25.562 CHRONIC PAIN OF BOTH KNEES: ICD-10-CM

## 2024-12-19 DIAGNOSIS — D64.9 ANEMIA, UNSPECIFIED: ICD-10-CM

## 2024-12-19 DIAGNOSIS — N18.32 TYPE 2 DIABETES MELLITUS WITH STAGE 3B CHRONIC KIDNEY DISEASE, WITHOUT LONG-TERM CURRENT USE OF INSULIN (H): ICD-10-CM

## 2024-12-19 DIAGNOSIS — G89.29 CHRONIC PAIN OF BOTH KNEES: ICD-10-CM

## 2024-12-19 DIAGNOSIS — G47.33 OSA (OBSTRUCTIVE SLEEP APNEA): ICD-10-CM

## 2024-12-19 DIAGNOSIS — E11.22 TYPE 2 DIABETES MELLITUS WITH STAGE 3B CHRONIC KIDNEY DISEASE, WITHOUT LONG-TERM CURRENT USE OF INSULIN (H): ICD-10-CM

## 2024-12-19 NOTE — LETTER
12/19/2024      Clarke Moreira  [address]  [facility] [address]        [facility] GERIATRICS    PRIMARY CARE PROVIDER AND CLINIC:  Henrry Shepard MD, [address] / [facility] [id number]  Chief Complaint   Patient presents with     Hospital F/U      [place] Medical Record Number:  [id number]  Place of Service where encounter took place:  [facility] (U)     Clarke Moreira  is a 74 year old  (1950), re-admitted to the above facility from  [facility]. Hospital stay 12/7/24 - 12/14/24. .     Hospital course was reviewed by me, is as per the hospital discharge summary and NP note    Patient is a past medical history diabetes mellitus type 2, chronic kidney disease, heart failure, atrial fibrillation, sleep apnea, chronic anemia.  He was previously discharged from this facility after hospitalization for weakness, bilateral knee pain.  He was discharged to home on November 27, 2024  Pt was readmitted to the hospital after falling at home, possibly related to hypotension, tamsulosin was discontinued.  Noted worsening right greater than left knee pain.  X-rays were unremarkable.    Patient states he is feeling stronger, notes that his function is improved now compared to when he was discharged from the nursing home several weeks ago.  He is considering discharging to assisted living given his increased care needs and recent hospitalizations.    He denies cough, chest pain, shortness of breath, dizziness.  Chronic lower extremity edema is at baseline.    CODE STATUS/ADVANCE DIRECTIVES DISCUSSION:  Prior  DNR / DNI  ALLERGIES:   Allergies   Allergen Reactions     Seasonal Allergies       PAST MEDICAL HISTORY:   Past Medical History:   Diagnosis Date     Atrial fibrillation (H)      Basal cell carcinoma      Chronic anticoagulation      Diastolic heart failure (H)      Dyslipidemia      Essential hypertension       Morbid obesity (H)      Sleep-disordered breathing      Type 2 diabetes mellitus (H)       PAST SURGICAL HISTORY:   has a past surgical history that includes biopsy of skin lesion; Mohs micrographic procedure; and picc insertion (Right, 09/24/2017).  FAMILY HISTORY: family history includes Depression in his mother; Glaucoma in his maternal grandfather.  SOCIAL HISTORY:   reports that he has never smoked. He has never used smokeless tobacco. He reports that he does not drink alcohol and does not use drugs.  Patient's living condition: lives alone    Current medications reviewed by me today      Current Outpatient Medications   Medication Sig Dispense Refill     acetaminophen (TYLENOL) 325 MG tablet Take 2 tablets (650 mg) by mouth every 4 hours as needed for mild pain or other (and adjunct with moderate or severe pain or per patient request).       aspirin (ASA) 81 MG chewable tablet Take 1 tablet (81 mg) by mouth daily. 200 tablet 2     atorvastatin (LIPITOR) 40 MG tablet Take 1 tablet (40 mg) by mouth daily 90 tablet 3     CERTAVITE/ANTIOXIDANTS tablet TAKE ONE TABLET BY MOUTH ONE TIME DAILY 100 tablet 0     ferrous sulfate (FEROSUL) 325 (65 Fe) MG tablet Take 1 tablet (325 mg) by mouth every other day. For iron deficiency anemia 60 tablet 1     levothyroxine (SYNTHROID/LEVOTHROID) 175 MCG tablet Take 1 tablet (175 mcg) by mouth every morning (before breakfast) 90 tablet 3     magnesium oxide (MAG-OX) 400 MG tablet Take 1 tablet (400 mg) by mouth daily. 30 tablet 0     metFORMIN (GLUCOPHAGE) 500 MG tablet Take 1 tablet (500 mg) by mouth 2 times daily (with meals). 180 tablet 3     metoprolol succinate ER (TOPROL XL) 100 MG 24 hr tablet Take 1 tablet (100 mg) by mouth daily.       Omega-3 Fatty Acids (EQL FISH OIL) 1000 MG CAPS Take 1 capsule by mouth daily 90 capsule 0     order for DME Equipment being ordered: Diabetes Shoes 1 Units 0     order for DME Equipment being ordered: Custom diabetic shoes 1 Units 0      "order for DME Equipment being ordered: Bariatric Lift chair  Diagnosis - morbid obesity, CHF, Lymphedema    Fax to Ne from [employer] at [phone]. 1 Units 0     order for DME Equipment being ordered: Other: Velcro Compression stockings.   Treatment Diagnosis: bilateral lymphedema. 2 each 0     potassium chloride chen ER (KLOR-CON M20) 20 MEQ CR tablet Take 2 tablets (40 mEq) by mouth 2 times daily.       senna-docusate (SENEXON-S) 8.6-50 MG tablet Take 1 to 2 tablets by mouth 2 times daily as needed for constipation 180 tablet 0     spironolactone (ALDACTONE) 50 MG tablet Take 1 tablet (50 mg) by mouth daily.       torsemide (DEMADEX) 20 MG tablet Take 60 mg by mouth daily.       urea (CARMOL) 10 % external lotion Apply topically 2 times daily as needed for dry skin.       VITAMIN A PO Take 1 tablet by mouth daily.       vitamin B complex with vitamin C (VITAMIN  B COMPLEX) tablet Take 1 tablet by mouth daily 100 tablet 3     vitamin C (ASCORBIC ACID) 1000 MG TABS Take 1,000 mg by mouth daily       Vitamin D, Cholecalciferol, 25 MCG (1000 UT) CAPS Take 1 capsule by mouth daily.       vitamin E (VITAMIN E COMPLEX) 1000 units (450 mg) capsule Take 1 capsule (1,000 Units) by mouth daily 100 capsule 3     warfarin ANTICOAGULANT (COUMADIN) 3 MG tablet Take by mouth 8 mg (5 mg x 1 tablet and 3 mg x 1 tablet) every day OR as directed by INR clinic 90 tablet 1     warfarin ANTICOAGULANT (COUMADIN) 5 MG tablet Take by mouth 8 mg (5 mg x 1 tablet and 3 mg x 1 tablet) every day OR as directed by INR clinic 90 tablet 1     No current facility-administered medications for this visit.       ROS:  10 point ROS of systems including Constitutional, Eyes, Respiratory, Cardiovascular, Gastroenterology, Genitourinary, Integumentary, Musculoskeletal, Psychiatric were all negative except for pertinent positives noted in my HPI.    Vitals:  /72   Pulse 82   Temp 97.8  F (36.6  C)   Resp 18   Ht 1.803 m (5' 11\")   Wt (!) " 158.8 kg (350 lb)   SpO2 95%   BMI 48.82 kg/m    Exam:  Very pleasant, obese male, sitting in chair in his room  He is in good spirits, appears comfortable  Lungs clear  CV irregular, heart rate 80s  Abdomen soft, obese  Extremities: Both lower extremities are wrapped.  Nonpitting edema.  No warmth or swelling right knee  Neuro: Nonfocal.  Fully oriented    Lab/Diagnostic data:  Most Recent 3 CBC's:  Recent Labs   Lab Test 12/08/24  0652 12/07/24  1419 11/05/24  0517   WBC 7.5 10.7 7.3   HGB 11.3* 11.7* 11.5*   MCV 79 79 76*    342 332     Most Recent 3 BMP's:  Recent Labs   Lab Test 12/14/24  1407 12/14/24  0756 12/14/24  0605 12/13/24  1747 12/13/24  0846 12/13/24  0735 12/12/24  0809 12/12/24  0642 12/10/24  0854 12/10/24  0504 12/08/24  0759 12/08/24  0652 12/07/24 2026 12/07/24  1419   NA  --   --   --   --   --   --   --   --   --  141  --  136  --  133*   POTASSIUM  --   --  4.0  --   --  3.9  --  4.3   < > 4.3   < > 2.7*   < > 2.6*   CHLORIDE  --   --   --   --   --   --   --   --   --  102  --  97*  --  92*   CO2  --   --   --   --   --   --   --   --   --  31*  --  27  --  25   BUN  --   --   --   --   --   --   --   --   --  10.8  --  10.1  --  14.0   CR  --   --   --   --   --   --   --   --   --  1.01  --  0.90  --  1.21*   ANIONGAP  --   --   --   --   --   --   --   --   --  8  --  12  --  16*   CHERI  --   --   --   --   --   --   --   --   --  9.0  --  8.9  --  9.1   * 129*  --  138*   < >  --    < >  --    < > 96   < > 102*  --  152*    < > = values in this interval not displayed.     Most Recent 2 LFT's:  Recent Labs   Lab Test 12/08/24  0652 12/07/24  1419   AST 20 22   ALT 13 14   ALKPHOS 93 103   BILITOTAL 0.4 0.4     Most Recent TSH and T4:  Recent Labs   Lab Test 10/07/24  1149 03/12/19  0625 12/11/18  1125   TSH 0.93   < > 11.20*   T4  --   --  1.21    < > = values in this interval not displayed.     Most Recent Hemoglobin A1c:  Recent Labs   Lab Test 10/07/24  1149   A1C 6.2*        ASSESSMENT/PLAN:    Fall with acute on chronic bilateral knee discomfort  No acute injury noted during hospitalization  Plan: Therapies.  Voltaren gel has been ordered.  Tylenol as needed    Hypertension with preserved ejection fraction  Chronic lower extremity edema  History of PAF.  Heart rhythm irregular, heart rate controlled  Plan: Continue torsemide 60 mg daily, spironolactone 50 mg daily, monitor blood pressure, BMP  Continue metoprolol, chronic warfarin therapy    History of chronic hypercapnic respiratory failure related to obesity, sleep apnea  Plan: CPAP    Diabetes mellitus type 2  Stable on metformin 500 mg twice daily  Plan: Periodic hemoglobin A1c monitoring    Hypothyroidism  Stable on levothyroxine    BPH  No acute urinary symptoms.  Tamsulosin was discontinued for concern for hypotension  Plan: Monitor voiding.  Consider resumption at night, if worsening symptoms, with close monitoring of blood pressure      Luis Nova MD            Sincerely,        Luis Nova MD

## 2024-12-20 LAB
ANION GAP SERPL CALCULATED.3IONS-SCNC: 9 MMOL/L (ref 7–15)
BUN SERPL-MCNC: 15.8 MG/DL (ref 8–23)
CALCIUM SERPL-MCNC: 8.8 MG/DL (ref 8.8–10.4)
CHLORIDE SERPL-SCNC: 98 MMOL/L (ref 98–107)
CREAT SERPL-MCNC: 1.08 MG/DL (ref 0.67–1.17)
EGFRCR SERPLBLD CKD-EPI 2021: 72 ML/MIN/1.73M2
GLUCOSE SERPL-MCNC: 96 MG/DL (ref 70–99)
HCO3 SERPL-SCNC: 28 MMOL/L (ref 22–29)
HGB BLD-MCNC: 11.3 G/DL (ref 13.3–17.7)
POTASSIUM SERPL-SCNC: 3.6 MMOL/L (ref 3.4–5.3)
SODIUM SERPL-SCNC: 135 MMOL/L (ref 135–145)

## 2024-12-20 PROCEDURE — 80048 BASIC METABOLIC PNL TOTAL CA: CPT | Mod: ORL | Performed by: NURSE PRACTITIONER

## 2024-12-20 PROCEDURE — 85018 HEMOGLOBIN: CPT | Mod: ORL | Performed by: NURSE PRACTITIONER

## 2024-12-20 PROCEDURE — 36415 COLL VENOUS BLD VENIPUNCTURE: CPT | Mod: ORL | Performed by: NURSE PRACTITIONER

## 2024-12-20 PROCEDURE — P9604 ONE-WAY ALLOW PRORATED TRIP: HCPCS | Mod: ORL | Performed by: NURSE PRACTITIONER

## 2024-12-21 ENCOUNTER — HEALTH MAINTENANCE LETTER (OUTPATIENT)
Age: 74
End: 2024-12-21

## 2024-12-23 ENCOUNTER — TRANSITIONAL CARE UNIT VISIT (OUTPATIENT)
Dept: GERIATRICS | Facility: CLINIC | Age: 74
End: 2024-12-23
Payer: COMMERCIAL

## 2024-12-23 VITALS
TEMPERATURE: 98 F | OXYGEN SATURATION: 96 % | HEART RATE: 90 BPM | WEIGHT: 315 LBS | BODY MASS INDEX: 49.62 KG/M2 | RESPIRATION RATE: 20 BRPM | SYSTOLIC BLOOD PRESSURE: 112 MMHG | DIASTOLIC BLOOD PRESSURE: 76 MMHG

## 2024-12-23 NOTE — PROGRESS NOTES
[facility] GERIATRICS    Chief Complaint   Patient presents with    RECHECK     HPI:  Clarke Moreira is a 74 year old  (1950), who is being seen today for an episodic care visit at: [facility] ([facility]) [[id number]]. Today's concern is: ***    Allergies, and PMH/PSH reviewed in [facility] today.  REVIEW OF SYSTEMS:  {zxmkkz75:228432}    Objective:   /76   Pulse 90   Temp 98  F (36.7  C)   Resp 20   Wt (!) 161.4 kg (355 lb 12.8 oz)   SpO2 96%   BMI 49.62 kg/m    {Nursing home physical exam :595192}    {fgslab:106503}    Assessment/Plan:  {S DX2:684319}    MED REC REQUIRED{TIP  Click the link below to document or use med rec list, use list to pull in response :513573}  Post Medication Reconciliation Status: {MED REC LIST:817233}      Orders:  {fgsorders:575809}  ***    Electronically signed by: Matthias Barrera ***

## 2024-12-24 ENCOUNTER — TELEPHONE (OUTPATIENT)
Dept: GERIATRICS | Facility: CLINIC | Age: 74
End: 2024-12-24
Payer: COMMERCIAL

## 2024-12-24 NOTE — TELEPHONE ENCOUNTER
"Provider: REAGAN Ugarte CNP   Facility: [facility]   Facility Type:  TCU    Caller: Milana   Call Back Number: [phone]  Reason for call: INR  Diagnosis/Goal: A. Fib  Heparin/Lovenox:  No  Currently on ABX?: No  Other interacting medication:  None  Missed or refused doses: No  Date INR Previous Dose/Orders   12/14 165 Hospital discharge orders said for patient to get 8mg, however the order was transcribed incorrectly and patient received 15mg.     12/15 2.6 8mg   12/16 2.33  10mg every day    12/20 2.7 10mg every day    12/24 2.9         Telephone encounter sent to: REAGAN Ugarte CNP     Requesting orders for Warfarin dosing and date of next INR draw  Please respond to \"Geriatrics Nurse Pool\".    Manda Fletcher RN   "

## 2024-12-26 ENCOUNTER — DOCUMENTATION ONLY (OUTPATIENT)
Dept: ANTICOAGULATION | Facility: CLINIC | Age: 74
End: 2024-12-26
Payer: COMMERCIAL

## 2024-12-26 NOTE — PROGRESS NOTES
ANTICOAGULATION     Clarke Moreira is overdue for an INR check.     Patient noted to be at [facility] (U) and geriatric is managing INR.  Last INR per chart review on 12/24/24.  Will postpone for another week       Quynh Trinidad RN  12/26/2024  Anticoagulation Clinic  [facility] for routing messages: benjamin ALBRIGHT  [facility] patient phone line: [phone]

## 2024-12-30 ENCOUNTER — TRANSITIONAL CARE UNIT VISIT (OUTPATIENT)
Dept: GERIATRICS | Facility: CLINIC | Age: 74
End: 2024-12-30
Payer: COMMERCIAL

## 2024-12-30 VITALS
RESPIRATION RATE: 18 BRPM | OXYGEN SATURATION: 95 % | HEIGHT: 71 IN | TEMPERATURE: 98.1 F | DIASTOLIC BLOOD PRESSURE: 78 MMHG | WEIGHT: 315 LBS | HEART RATE: 97 BPM | SYSTOLIC BLOOD PRESSURE: 113 MMHG | BODY MASS INDEX: 44.1 KG/M2

## 2024-12-30 DIAGNOSIS — I48.0 PAROXYSMAL ATRIAL FIBRILLATION (H): ICD-10-CM

## 2024-12-30 DIAGNOSIS — Z79.01 CHRONIC ANTICOAGULATION: ICD-10-CM

## 2024-12-30 DIAGNOSIS — I50.30 HEART FAILURE WITH PRESERVED EJECTION FRACTION, NYHA CLASS I (H): ICD-10-CM

## 2024-12-30 DIAGNOSIS — E87.70 HYPERVOLEMIA, UNSPECIFIED HYPERVOLEMIA TYPE: Primary | ICD-10-CM

## 2024-12-30 PROCEDURE — 99309 SBSQ NF CARE MODERATE MDM 30: CPT | Performed by: NURSE PRACTITIONER

## 2024-12-30 NOTE — LETTER
" 12/30/2024      Clarke Moreira  [address]  [facility] [address]                                                                                           [facility] GERIATRICS      Code Status:  DNR/DNI  Visit Type: RECHECK     Facility:   [facility] ([facility]) [[id number]]         History of Present Illness: Clarke Moreira is a 74 year old male with a past medical history for DM2, CKD, HFpEF, afib, depression, THONG, chronic anemia, hypothyroidism, HLD and BPH. He was recently hospitalized due to a fall ans weakness.  He was found to have hypokalemia and hyponatremia and hypotension like due to overdiuresis with tamsulosin.      Today, nursing reports a 10lbs weight gain in the past few days.  He has chronic edema but he feels that there is more fluid in his abdomen.  He denies any shortness of breath just \"feels full\".  He reports he has been drinking approximately 4-5 liters.      INR 3.4 today.  No s/s of bleeding.     Current Outpatient Medications   Medication Sig Dispense Refill     acetaminophen (TYLENOL) 325 MG tablet Take 2 tablets (650 mg) by mouth every 4 hours as needed for mild pain or other (and adjunct with moderate or severe pain or per patient request).       aspirin (ASA) 81 MG chewable tablet Take 1 tablet (81 mg) by mouth daily. 200 tablet 2     atorvastatin (LIPITOR) 40 MG tablet Take 1 tablet (40 mg) by mouth daily 90 tablet 3     CERTAVITE/ANTIOXIDANTS tablet TAKE ONE TABLET BY MOUTH ONE TIME DAILY 100 tablet 0     ferrous sulfate (FEROSUL) 325 (65 Fe) MG tablet Take 1 tablet (325 mg) by mouth every other day. For iron deficiency anemia 60 tablet 1     levothyroxine (SYNTHROID/LEVOTHROID) 175 MCG tablet Take 1 tablet (175 mcg) by mouth every morning (before breakfast) 90 tablet 3     magnesium oxide (MAG-OX) 400 MG tablet Take 1 tablet (400 mg) by mouth daily. 30 tablet 0     metFORMIN (GLUCOPHAGE) 500 MG tablet Take 1 tablet (500 mg) by mouth 2 times daily " (with meals). 180 tablet 3     metoprolol succinate ER (TOPROL XL) 100 MG 24 hr tablet Take 1 tablet (100 mg) by mouth daily.       Omega-3 Fatty Acids (EQL FISH OIL) 1000 MG CAPS Take 1 capsule by mouth daily 90 capsule 0     order for DME Equipment being ordered: Diabetes Shoes 1 Units 0     order for DME Equipment being ordered: Custom diabetic shoes 1 Units 0     order for DME Equipment being ordered: Bariatric Lift chair  Diagnosis - morbid obesity, CHF, Lymphedema    Fax to Ne from [employer] at [phone]. 1 Units 0     order for DME Equipment being ordered: Other: Velcro Compression stockings.   Treatment Diagnosis: bilateral lymphedema. 2 each 0     potassium chloride chen ER (KLOR-CON M20) 20 MEQ CR tablet Take 2 tablets (40 mEq) by mouth 2 times daily.       senna-docusate (SENEXON-S) 8.6-50 MG tablet Take 1 to 2 tablets by mouth 2 times daily as needed for constipation 180 tablet 0     spironolactone (ALDACTONE) 50 MG tablet Take 1 tablet (50 mg) by mouth daily.       torsemide (DEMADEX) 20 MG tablet Take 60 mg by mouth daily.       urea (CARMOL) 10 % external lotion Apply topically 2 times daily as needed for dry skin.       VITAMIN A PO Take 1 tablet by mouth daily.       vitamin B complex with vitamin C (VITAMIN  B COMPLEX) tablet Take 1 tablet by mouth daily 100 tablet 3     vitamin C (ASCORBIC ACID) 1000 MG TABS Take 1,000 mg by mouth daily       Vitamin D, Cholecalciferol, 25 MCG (1000 UT) CAPS Take 1 capsule by mouth daily.       vitamin E (VITAMIN E COMPLEX) 1000 units (450 mg) capsule Take 1 capsule (1,000 Units) by mouth daily 100 capsule 3     warfarin ANTICOAGULANT (COUMADIN) 3 MG tablet Take by mouth 8 mg (5 mg x 1 tablet and 3 mg x 1 tablet) every day OR as directed by INR clinic 90 tablet 1     warfarin ANTICOAGULANT (COUMADIN) 5 MG tablet Take by mouth 8 mg (5 mg x 1 tablet and 3 mg x 1 tablet) every day OR as directed by INR clinic 90 tablet 1       Review of Systems   Patient  "denies fever, chills, headache, lightheadedness, dizziness, rhinorrhea, cough, congestion, shortness of breath, chest pain, palpitations, abdominal pain, n/v, diarrhea, constipation, change in appetite, change in sleep pattern, dysuria, frequency, burning or pain with urination.  Other than stated in HPI all other review of systems is negative.       Physical Exam  Vital signs:/78   Pulse 97   Temp 98.1  F (36.7  C)   Resp 18   Ht 1.803 m (5' 11\")   Wt (!) 161.5 kg (356 lb)   SpO2 95%   BMI 49.65 kg/m     GENERAL APPEARANCE: Well developed, well nourished, in no acute distress.  HEENT: normocephalic, atraumatic   sclerae anicteric, conjunctivae clear and moist, EOM intact  LUNGS: Lung sounds CTA, no adventitious sounds, respiratory effort normal.  CARD: RRR, S1, S2, without murmurs, gallops, rubs  ABD: mild fluid retention of abdomen  MSK: Muscle strength and tone were equal bilaterally. Moves all extremities easily and intentionally.   EXTREMITIES: 2+ pitting edema BLE  NEURO: Alert and oriented x 3. Face is symmetric.  SKIN: Inspection of the skin reveals no rashes, ulcerations or petechiae.  PSYCH: euthymic        Labs:    Last Comprehensive Metabolic Panel:  Lab Results   Component Value Date     12/20/2024    POTASSIUM 3.6 12/20/2024    CHLORIDE 98 12/20/2024    CO2 28 12/20/2024    ANIONGAP 9 12/20/2024    GLC 96 12/20/2024    BUN 15.8 12/20/2024    CR 1.08 12/20/2024    GFRESTIMATED 72 12/20/2024    CHERI 8.8 12/20/2024       Lab Results   Component Value Date    WBC 7.5 12/08/2024    WBC 11.1 05/06/2021     Lab Results   Component Value Date    RBC 4.52 12/08/2024    RBC 5.45 05/06/2021     Lab Results   Component Value Date    HGB 11.3 12/20/2024    HGB 16.3 05/06/2021     Lab Results   Component Value Date    HCT 35.6 12/08/2024    HCT 49.4 05/06/2021     Lab Results   Component Value Date    MCV 79 12/08/2024    MCV 91 05/06/2021     Lab Results   Component Value Date    MCH 25.0 " 12/08/2024    MCH 29.5 05/06/2021     Lab Results   Component Value Date    MCHC 31.7 12/08/2024    MCHC 32.6 05/06/2021     Lab Results   Component Value Date    RDW 18.8 12/08/2024    RDW 13.6 05/06/2021     Lab Results   Component Value Date     12/08/2024     05/06/2021           Assessment/Plan:  Hypervolemia, unspecified hypervolemia type  Heart failure with preserved ejection fraction, NYHA class I (H)  Fluid up, increase torsemide 100mg daily x 3 days and then change back to 60mg daily.  Advised patient to restrict fluid intake to 2L/day.  Advised that now his torsemide is lower he needs to drink less.  Continue with spironolactone and metoprolol.  Check BMP on 1/2.     Paroxysmal atrial fibrillation (H)  Chronic anticoagulation  Rate controlled with Metoprolol.  INR supratherapeutic 3.4.  hold warfarin and recheck INR tomorrow.  Would recommend changing his warfarin regimen to 8mg 3x week and 10mg 4 x week.             Electronically signed by:Alycia Boyd NP              Sincerely,        Alycia Boyd NP    Electronically signed

## 2024-12-30 NOTE — PROGRESS NOTES
"                                                                                   [facility]      Code Status:  DNR/DNI  Visit Type: RECHECK     Facility:   [facility] ([facility]) [[id number]]         History of Present Illness: Clarke Moreira is a 74 year old male with a past medical history for DM2, CKD, HFpEF, afib, depression, THONG, chronic anemia, hypothyroidism, HLD and BPH. He was recently hospitalized due to a fall ans weakness.  He was found to have hypokalemia and hyponatremia and hypotension like due to overdiuresis with tamsulosin.      Today, nursing reports a 10lbs weight gain in the past few days.  He has chronic edema but he feels that there is more fluid in his abdomen.  He denies any shortness of breath just \"feels full\".  He reports he has been drinking approximately 4-5 liters.      INR 3.4 today.  No s/s of bleeding.     Current Outpatient Medications   Medication Sig Dispense Refill    acetaminophen (TYLENOL) 325 MG tablet Take 2 tablets (650 mg) by mouth every 4 hours as needed for mild pain or other (and adjunct with moderate or severe pain or per patient request).      aspirin (ASA) 81 MG chewable tablet Take 1 tablet (81 mg) by mouth daily. 200 tablet 2    atorvastatin (LIPITOR) 40 MG tablet Take 1 tablet (40 mg) by mouth daily 90 tablet 3    CERTAVITE/ANTIOXIDANTS tablet TAKE ONE TABLET BY MOUTH ONE TIME DAILY 100 tablet 0    ferrous sulfate (FEROSUL) 325 (65 Fe) MG tablet Take 1 tablet (325 mg) by mouth every other day. For iron deficiency anemia 60 tablet 1    levothyroxine (SYNTHROID/LEVOTHROID) 175 MCG tablet Take 1 tablet (175 mcg) by mouth every morning (before breakfast) 90 tablet 3    magnesium oxide (MAG-OX) 400 MG tablet Take 1 tablet (400 mg) by mouth daily. 30 tablet 0    metFORMIN (GLUCOPHAGE) 500 MG tablet Take 1 tablet (500 mg) by mouth 2 times daily (with meals). 180 tablet 3    metoprolol succinate ER (TOPROL XL) 100 MG 24 hr tablet Take 1 tablet " (100 mg) by mouth daily.      Omega-3 Fatty Acids (EQL FISH OIL) 1000 MG CAPS Take 1 capsule by mouth daily 90 capsule 0    order for DME Equipment being ordered: Diabetes Shoes 1 Units 0    order for DME Equipment being ordered: Custom diabetic shoes 1 Units 0    order for DME Equipment being ordered: Bariatric Lift chair  Diagnosis - morbid obesity, CHF, Lymphedema    Fax to Ne from [employer] at [phone]. 1 Units 0    order for DME Equipment being ordered: Other: Velcro Compression stockings.   Treatment Diagnosis: bilateral lymphedema. 2 each 0    potassium chloride chen ER (KLOR-CON M20) 20 MEQ CR tablet Take 2 tablets (40 mEq) by mouth 2 times daily.      senna-docusate (SENEXON-S) 8.6-50 MG tablet Take 1 to 2 tablets by mouth 2 times daily as needed for constipation 180 tablet 0    spironolactone (ALDACTONE) 50 MG tablet Take 1 tablet (50 mg) by mouth daily.      torsemide (DEMADEX) 20 MG tablet Take 60 mg by mouth daily.      urea (CARMOL) 10 % external lotion Apply topically 2 times daily as needed for dry skin.      VITAMIN A PO Take 1 tablet by mouth daily.      vitamin B complex with vitamin C (VITAMIN  B COMPLEX) tablet Take 1 tablet by mouth daily 100 tablet 3    vitamin C (ASCORBIC ACID) 1000 MG TABS Take 1,000 mg by mouth daily      Vitamin D, Cholecalciferol, 25 MCG (1000 UT) CAPS Take 1 capsule by mouth daily.      vitamin E (VITAMIN E COMPLEX) 1000 units (450 mg) capsule Take 1 capsule (1,000 Units) by mouth daily 100 capsule 3    warfarin ANTICOAGULANT (COUMADIN) 3 MG tablet Take by mouth 8 mg (5 mg x 1 tablet and 3 mg x 1 tablet) every day OR as directed by INR clinic 90 tablet 1    warfarin ANTICOAGULANT (COUMADIN) 5 MG tablet Take by mouth 8 mg (5 mg x 1 tablet and 3 mg x 1 tablet) every day OR as directed by INR clinic 90 tablet 1       Review of Systems   Patient denies fever, chills, headache, lightheadedness, dizziness, rhinorrhea, cough, congestion, shortness of breath, chest pain,  "palpitations, abdominal pain, n/v, diarrhea, constipation, change in appetite, change in sleep pattern, dysuria, frequency, burning or pain with urination.  Other than stated in HPI all other review of systems is negative.       Physical Exam  Vital signs:/78   Pulse 97   Temp 98.1  F (36.7  C)   Resp 18   Ht 1.803 m (5' 11\")   Wt (!) 161.5 kg (356 lb)   SpO2 95%   BMI 49.65 kg/m     GENERAL APPEARANCE: Well developed, well nourished, in no acute distress.  HEENT: normocephalic, atraumatic   sclerae anicteric, conjunctivae clear and moist, EOM intact  LUNGS: Lung sounds CTA, no adventitious sounds, respiratory effort normal.  CARD: RRR, S1, S2, without murmurs, gallops, rubs  ABD: mild fluid retention of abdomen  MSK: Muscle strength and tone were equal bilaterally. Moves all extremities easily and intentionally.   EXTREMITIES: 2+ pitting edema BLE  NEURO: Alert and oriented x 3. Face is symmetric.  SKIN: Inspection of the skin reveals no rashes, ulcerations or petechiae.  PSYCH: euthymic        Labs:    Last Comprehensive Metabolic Panel:  Lab Results   Component Value Date     12/20/2024    POTASSIUM 3.6 12/20/2024    CHLORIDE 98 12/20/2024    CO2 28 12/20/2024    ANIONGAP 9 12/20/2024    GLC 96 12/20/2024    BUN 15.8 12/20/2024    CR 1.08 12/20/2024    GFRESTIMATED 72 12/20/2024    CHERI 8.8 12/20/2024       Lab Results   Component Value Date    WBC 7.5 12/08/2024    WBC 11.1 05/06/2021     Lab Results   Component Value Date    RBC 4.52 12/08/2024    RBC 5.45 05/06/2021     Lab Results   Component Value Date    HGB 11.3 12/20/2024    HGB 16.3 05/06/2021     Lab Results   Component Value Date    HCT 35.6 12/08/2024    HCT 49.4 05/06/2021     Lab Results   Component Value Date    MCV 79 12/08/2024    MCV 91 05/06/2021     Lab Results   Component Value Date    MCH 25.0 12/08/2024    MCH 29.5 05/06/2021     Lab Results   Component Value Date    [facility] 31.7 12/08/2024    [facility] 32.6 05/06/2021     Lab " Results   Component Value Date    RDW 18.8 12/08/2024    RDW 13.6 05/06/2021     Lab Results   Component Value Date     12/08/2024     05/06/2021           Assessment/Plan:  Hypervolemia, unspecified hypervolemia type  Heart failure with preserved ejection fraction, NYHA class I (H)  Fluid up, increase torsemide 100mg daily x 3 days and then change back to 60mg daily.  Advised patient to restrict fluid intake to 2L/day.  Advised that now his torsemide is lower he needs to drink less.  Continue with spironolactone and metoprolol.  Check BMP on 1/2.     Paroxysmal atrial fibrillation (H)  Chronic anticoagulation  Rate controlled with Metoprolol.  INR supratherapeutic 3.4.  hold warfarin and recheck INR tomorrow.  Would recommend changing his warfarin regimen to 8mg 3x week and 10mg 4 x week.             Electronically signed by:Alycia Boyd NP

## 2025-01-01 ENCOUNTER — LAB REQUISITION (OUTPATIENT)
Dept: LAB | Facility: CLINIC | Age: 75
End: 2025-01-01
Payer: COMMERCIAL

## 2025-01-01 DIAGNOSIS — Z51.81 ENCOUNTER FOR THERAPEUTIC DRUG LEVEL MONITORING: ICD-10-CM

## 2025-01-02 ENCOUNTER — LAB REQUISITION (OUTPATIENT)
Dept: LAB | Facility: CLINIC | Age: 75
End: 2025-01-02
Payer: COMMERCIAL

## 2025-01-02 ENCOUNTER — TRANSITIONAL CARE UNIT VISIT (OUTPATIENT)
Dept: GERIATRICS | Facility: CLINIC | Age: 75
End: 2025-01-02
Payer: COMMERCIAL

## 2025-01-02 VITALS
TEMPERATURE: 97.5 F | BODY MASS INDEX: 50.61 KG/M2 | DIASTOLIC BLOOD PRESSURE: 72 MMHG | WEIGHT: 315 LBS | OXYGEN SATURATION: 95 % | HEART RATE: 105 BPM | RESPIRATION RATE: 18 BRPM | SYSTOLIC BLOOD PRESSURE: 127 MMHG

## 2025-01-02 DIAGNOSIS — E11.22 TYPE 2 DIABETES MELLITUS WITH STAGE 3B CHRONIC KIDNEY DISEASE, WITHOUT LONG-TERM CURRENT USE OF INSULIN (H): ICD-10-CM

## 2025-01-02 DIAGNOSIS — I50.33 ACUTE ON CHRONIC DIASTOLIC CONGESTIVE HEART FAILURE (H): Primary | ICD-10-CM

## 2025-01-02 DIAGNOSIS — G89.29 CHRONIC PAIN OF BOTH KNEES: ICD-10-CM

## 2025-01-02 DIAGNOSIS — M25.562 CHRONIC PAIN OF BOTH KNEES: ICD-10-CM

## 2025-01-02 DIAGNOSIS — N18.32 TYPE 2 DIABETES MELLITUS WITH STAGE 3B CHRONIC KIDNEY DISEASE, WITHOUT LONG-TERM CURRENT USE OF INSULIN (H): ICD-10-CM

## 2025-01-02 DIAGNOSIS — M25.561 CHRONIC PAIN OF BOTH KNEES: ICD-10-CM

## 2025-01-02 DIAGNOSIS — I11.0 HYPERTENSIVE HEART DISEASE WITH CHRONIC DIASTOLIC CONGESTIVE HEART FAILURE (H): ICD-10-CM

## 2025-01-02 DIAGNOSIS — R05.9 COUGH, UNSPECIFIED: ICD-10-CM

## 2025-01-02 DIAGNOSIS — I48.0 PAROXYSMAL ATRIAL FIBRILLATION (H): ICD-10-CM

## 2025-01-02 DIAGNOSIS — I50.32 HYPERTENSIVE HEART DISEASE WITH CHRONIC DIASTOLIC CONGESTIVE HEART FAILURE (H): ICD-10-CM

## 2025-01-02 DIAGNOSIS — I48.20 CHRONIC ATRIAL FIBRILLATION (H): ICD-10-CM

## 2025-01-02 DIAGNOSIS — R53.81 PHYSICAL DECONDITIONING: ICD-10-CM

## 2025-01-02 LAB
ANION GAP SERPL CALCULATED.3IONS-SCNC: 11 MMOL/L (ref 7–15)
BUN SERPL-MCNC: 13.6 MG/DL (ref 8–23)
CALCIUM SERPL-MCNC: 9 MG/DL (ref 8.8–10.4)
CHLORIDE SERPL-SCNC: 96 MMOL/L (ref 98–107)
CREAT SERPL-MCNC: 1.06 MG/DL (ref 0.67–1.17)
EGFRCR SERPLBLD CKD-EPI 2021: 74 ML/MIN/1.73M2
GLUCOSE SERPL-MCNC: 122 MG/DL (ref 70–99)
HCO3 SERPL-SCNC: 27 MMOL/L (ref 22–29)
POTASSIUM SERPL-SCNC: 3.6 MMOL/L (ref 3.4–5.3)
SODIUM SERPL-SCNC: 134 MMOL/L (ref 135–145)

## 2025-01-02 PROCEDURE — P9604 ONE-WAY ALLOW PRORATED TRIP: HCPCS | Mod: ORL | Performed by: INTERNAL MEDICINE

## 2025-01-02 PROCEDURE — 87633 RESP VIRUS 12-25 TARGETS: CPT | Mod: ORL | Performed by: NURSE PRACTITIONER

## 2025-01-02 PROCEDURE — 80048 BASIC METABOLIC PNL TOTAL CA: CPT | Mod: ORL | Performed by: INTERNAL MEDICINE

## 2025-01-02 PROCEDURE — 36415 COLL VENOUS BLD VENIPUNCTURE: CPT | Mod: ORL | Performed by: INTERNAL MEDICINE

## 2025-01-02 PROCEDURE — 99310 SBSQ NF CARE HIGH MDM 45: CPT | Performed by: NURSE PRACTITIONER

## 2025-01-02 NOTE — PROGRESS NOTES
[facility] GERIATRICS    Chief Complaint   Patient presents with    RECHECK     HPI:  Clarke Moreira is a 74 year old  (1950), who is being seen today for an episodic care visit at: [facility] ([facility]) [[id number]]. Today's concern is: ***    Allergies, and PMH/PSH reviewed in [facility] today.  REVIEW OF SYSTEMS:  {udmhze13:885168}    Objective:   /72   Pulse 105   Temp 97.5  F (36.4  C)   Resp 18   Wt (!) 164.6 kg (362 lb 14.4 oz)   SpO2 95%   BMI 50.61 kg/m    {Nursing home physical exam :313311}    {fgslab:380867}    Assessment/Plan:  {S DX2:736516}    MED REC REQUIRED{TIP  Click the link below to document or use med rec list, use list to pull in response :071987}  Post Medication Reconciliation Status: {MED REC LIST:764309}      Orders:  {fgsorders:599789}  ***    Electronically signed by: Matthias Barrera ***       normal    CBC RESULTS:   Recent Labs   Lab Test 12/20/24  0847 12/08/24  0652 12/07/24  1419   WBC  --  7.5 10.7   RBC  --  4.52 4.91   HGB 11.3* 11.3* 11.7*   HCT  --  35.6* 38.8*   MCV  --  79 79   MCH  --  25.0* 23.8*   MCHC  --  31.7 30.2*   RDW  --  18.8* 18.9*   PLT  --  284 342       Last Basic Metabolic Panel:  Recent Labs   Lab Test 01/02/25  1000 12/20/24  0847   * 135   POTASSIUM 3.6 3.6   CHLORIDE 96* 98   CHERI 9.0 8.8   CO2 27 28   BUN 13.6 15.8   CR 1.06 1.08   * 96       Liver Function Studies -   Recent Labs   Lab Test 12/08/24  0652 12/07/24  1419   PROTTOTAL 6.1* 6.7   ALBUMIN 3.1* 3.6   BILITOTAL 0.4 0.4   ALKPHOS 93 103   AST 20 22   ALT 13 14       TSH   Date Value Ref Range Status   10/07/2024 0.93 0.30 - 4.20 uIU/mL Final   09/22/2024 0.79 0.30 - 4.20 uIU/mL Final   05/09/2022 3.98 0.40 - 4.00 mU/L Final   11/09/2020 2.69 0.40 - 4.00 mU/L Final   11/05/2019 0.75 0.40 - 4.00 mU/L Final   ]    Lab Results   Component Value Date    A1C 6.2 10/07/2024    A1C 6.5 09/26/2024    A1C 6.2 05/06/2021    A1C 5.9 11/09/2020       Assessment/Plan:  HFpEF  Hypertensive heart disease   With current exacerbation. Echo  10/25:  EF 55-60%.  Weights have increased over the past 2 weeks.  At hospital discharge she was 342 pounds and weights are now recorded at 359 pounds and this morning was up to 362 pounds.  Unclear if these weights are being done in the morning but they are documented as being completed midday.  Regardless, his edema has increased and is present up to the area past his knees.  -128, clear lungs today. Has received 100 mg of torsemide for the past 3 days without significant improvement. Of note, he sits in the wheelchair all day with legs in the dependent positioning. Encouraged him to elevate throughout the day.   -- change torsemide to 50 mg BID at 0800 and 1300 x 3 days, reassess on Monday. Consider a change to bumex or a dose of metolazone on Tuesday 1/7 depending on how  edema looks on 1/6.   --Continue with spironolactone 50 mg daily  -- Continue potassium chloride to 40 mg BID  --lymph therapy to eval and treat  --Continue to monitor blood pressure and heart rate, adjust medications as needed.  -- BMP on 1/6.     Lower back pain    Bilateral knee hip pain   Has bilateral knee pain.  Did have several falls at home due to his knees buckling. Weakness.  -- Continue Voltaren gel 1%.  Apply 2 g to bilateral knees 3 times daily.  -- Ongoing PT OT for strengthening  -- tylenol as needed.      Paroxysmal A fib  Long term anticoagulation   CHADSVASC 5. INR  was supratherapuetic last week now 2.4 today.   -- Continue with metoprolol  mg daily.   --coumadin 9 mg daily with a recheck on 1/6.     CKD III:   baseline Cr around 1.   --Avoid nephrotoxic medications.      T2DM with neuropathy  obesity   A1c 6.2 10/7. Body mass index is 45.82 kg/m .  -  Metformin 500 mg bid     HLD  - Continue ASA and statin     Depression  anxiety  Does have some anxiety at times but is comfortable here at the TCU since he had a several week stay here in November.  We discussed moving to an assisted living upon admission to the TCU which he is on board with but now after speaking with social work, he will likely return back to his home     Chronic microcytic anemia  baseline  hgb 10.5-12. Iron low to 14 9/22/2024. HGB 11.3 at hospital  -Continue iron supplement daily  - may need out patient  GI work up       Hypothyroidism:   TSH 0.93 10/7/24  -- Continue synthroid      BPH  Tamsulosin was stopped during hospital stay.   --follow clinically     Physical deconditioning  Secondary to recent hospitalization and underlying medical conditions.   --ongoing PT/OT for strengthening.   -- Social work for discharge planning    MED REC REQUIRED  Post Medication Reconciliation Status: medication reconcilation previously completed during another office visit      Electronically signed by: REAGAN Crandall CNP

## 2025-01-02 NOTE — LETTER
1/2/2025      Clarke Moreira  [address]  [facility] [id number]        No notes on file      Sincerely,        Oma Ram, REAGAN CNP    Electronically signed   symmetrically  PSYCH:  insight and judgement intact, memory intact, affect and mood normal    CBC RESULTS:   Recent Labs   Lab Test 12/20/24  0847 12/08/24  0652 12/07/24  1419   WBC  --  7.5 10.7   RBC  --  4.52 4.91   HGB 11.3* 11.3* 11.7*   HCT  --  35.6* 38.8*   MCV  --  79 79   MCH  --  25.0* 23.8*   MCHC  --  31.7 30.2*   RDW  --  18.8* 18.9*   PLT  --  284 342       Last Basic Metabolic Panel:  Recent Labs   Lab Test 01/02/25  1000 12/20/24  0847   * 135   POTASSIUM 3.6 3.6   CHLORIDE 96* 98   CHERI 9.0 8.8   CO2 27 28   BUN 13.6 15.8   CR 1.06 1.08   * 96       Liver Function Studies -   Recent Labs   Lab Test 12/08/24  0652 12/07/24  1419   PROTTOTAL 6.1* 6.7   ALBUMIN 3.1* 3.6   BILITOTAL 0.4 0.4   ALKPHOS 93 103   AST 20 22   ALT 13 14       TSH   Date Value Ref Range Status   10/07/2024 0.93 0.30 - 4.20 uIU/mL Final   09/22/2024 0.79 0.30 - 4.20 uIU/mL Final   05/09/2022 3.98 0.40 - 4.00 mU/L Final   11/09/2020 2.69 0.40 - 4.00 mU/L Final   11/05/2019 0.75 0.40 - 4.00 mU/L Final   ]    Lab Results   Component Value Date    A1C 6.2 10/07/2024    A1C 6.5 09/26/2024    A1C 6.2 05/06/2021    A1C 5.9 11/09/2020       Assessment/Plan:  HFpEF  Hypertensive heart disease   With current exacerbation. Echo  10/25:  EF 55-60%.  Weights have increased over the past 2 weeks.  At hospital discharge she was 342 pounds and weights are now recorded at 359 pounds and this morning was up to 362 pounds.  Unclear if these weights are being done in the morning but they are documented as being completed midday.  Regardless, his edema has increased and is present up to the area past his knees.  -128, clear lungs today. Has received 100 mg of torsemide for the past 3 days without significant improvement. Of note, he sits in the wheelchair all day with legs in the dependent positioning. Encouraged him to elevate throughout the day.   -- change torsemide to 50 mg BID at 0800 and 1300 x 3 days, reassess on Monday.  Consider a change to bumex or a dose of metolazone on Tuesday 1/7 depending on how edema looks on 1/6.   --Continue with spironolactone 50 mg daily  -- Continue potassium chloride to 40 mg BID  --lymph therapy to eval and treat  --Continue to monitor blood pressure and heart rate, adjust medications as needed.  -- BMP on 1/6.     Lower back pain    Bilateral knee hip pain   Has bilateral knee pain.  Did have several falls at home due to his knees buckling. Weakness.  -- Continue Voltaren gel 1%.  Apply 2 g to bilateral knees 3 times daily.  -- Ongoing PT OT for strengthening  -- tylenol as needed.      Paroxysmal A fib  Long term anticoagulation   CHADSVASC 5. INR  was supratherapuetic last week now 2.4 today.   -- Continue with metoprolol  mg daily.   --coumadin 9 mg daily with a recheck on 1/6.     CKD III:   baseline Cr around 1.   --Avoid nephrotoxic medications.      T2DM with neuropathy  obesity   A1c 6.2 10/7. Body mass index is 45.82 kg/m .  -  Metformin 500 mg bid     HLD  - Continue ASA and statin     Depression  anxiety  Does have some anxiety at times but is comfortable here at the TCU since he had a several week stay here in November.  We discussed moving to an assisted living upon admission to the TCU which he is on board with but now after speaking with social work, he will likely return back to his home     Chronic microcytic anemia  baseline  hgb 10.5-12. Iron low to 14 9/22/2024. HGB 11.3 at hospital  -Continue iron supplement daily  - may need out patient  GI work up       Hypothyroidism:   TSH 0.93 10/7/24  -- Continue synthroid      BPH  Tamsulosin was stopped during hospital stay.   --follow clinically     Physical deconditioning  Secondary to recent hospitalization and underlying medical conditions.   --ongoing PT/OT for strengthening.   -- Social work for discharge planning    MED REC REQUIRED  Post Medication Reconciliation Status: medication reconcilation previously completed during  another office visit      Electronically signed by: REAGAN Crandall CNP           Sincerely,        REAGAN Crandall CNP    Electronically signed

## 2025-01-03 LAB
C PNEUM DNA SPEC QL NAA+PROBE: NOT DETECTED
FLUAV H1 2009 PAND RNA SPEC QL NAA+PROBE: NOT DETECTED
FLUAV H1 RNA SPEC QL NAA+PROBE: NOT DETECTED
FLUAV H3 RNA SPEC QL NAA+PROBE: NOT DETECTED
FLUAV RNA SPEC QL NAA+PROBE: NOT DETECTED
FLUBV RNA SPEC QL NAA+PROBE: NOT DETECTED
HADV DNA SPEC QL NAA+PROBE: NOT DETECTED
HCOV PNL SPEC NAA+PROBE: NOT DETECTED
HMPV RNA SPEC QL NAA+PROBE: NOT DETECTED
HPIV1 RNA SPEC QL NAA+PROBE: NOT DETECTED
HPIV2 RNA SPEC QL NAA+PROBE: NOT DETECTED
HPIV3 RNA SPEC QL NAA+PROBE: NOT DETECTED
HPIV4 RNA SPEC QL NAA+PROBE: NOT DETECTED
M PNEUMO DNA SPEC QL NAA+PROBE: NOT DETECTED
RSV RNA SPEC QL NAA+PROBE: NOT DETECTED
RSV RNA SPEC QL NAA+PROBE: NOT DETECTED
RV+EV RNA SPEC QL NAA+PROBE: NOT DETECTED

## 2025-01-04 ENCOUNTER — DOCUMENTATION ONLY (OUTPATIENT)
Dept: GERIATRICS | Facility: CLINIC | Age: 75
End: 2025-01-04
Payer: COMMERCIAL

## 2025-01-04 NOTE — PROGRESS NOTES
Patient testing positive for COVID - requesting anti-viral tx.    Orders given for Molnupiravir 800mg PO q12h x5d    Dr. Nga Robbins, APRN, DNP, AGNP-BC, PMHNP-BC

## 2025-01-06 ENCOUNTER — TELEPHONE (OUTPATIENT)
Dept: GERIATRICS | Facility: CLINIC | Age: 75
End: 2025-01-06

## 2025-01-06 ENCOUNTER — TRANSITIONAL CARE UNIT VISIT (OUTPATIENT)
Dept: GERIATRICS | Facility: CLINIC | Age: 75
End: 2025-01-06
Payer: COMMERCIAL

## 2025-01-06 VITALS
DIASTOLIC BLOOD PRESSURE: 71 MMHG | SYSTOLIC BLOOD PRESSURE: 103 MMHG | RESPIRATION RATE: 18 BRPM | OXYGEN SATURATION: 96 % | BODY MASS INDEX: 50.17 KG/M2 | WEIGHT: 315 LBS | HEART RATE: 79 BPM | TEMPERATURE: 97.9 F

## 2025-01-06 DIAGNOSIS — I50.32 CHRONIC HEART FAILURE WITH PRESERVED EJECTION FRACTION (H): ICD-10-CM

## 2025-01-06 DIAGNOSIS — J96.12 CHRONIC RESPIRATORY FAILURE WITH HYPERCAPNIA (H): ICD-10-CM

## 2025-01-06 DIAGNOSIS — U07.1 COVID: Primary | ICD-10-CM

## 2025-01-06 DIAGNOSIS — F33.1 MODERATE EPISODE OF RECURRENT MAJOR DEPRESSIVE DISORDER (H): ICD-10-CM

## 2025-01-06 DIAGNOSIS — Z79.01 LONG TERM CURRENT USE OF ANTICOAGULANT THERAPY: ICD-10-CM

## 2025-01-06 DIAGNOSIS — E11.42 TYPE 2 DIABETES MELLITUS WITH DIABETIC POLYNEUROPATHY, WITHOUT LONG-TERM CURRENT USE OF INSULIN (H): ICD-10-CM

## 2025-01-06 PROCEDURE — 99310 SBSQ NF CARE HIGH MDM 45: CPT | Performed by: NURSE PRACTITIONER

## 2025-01-06 NOTE — PROGRESS NOTES
[facility] GERIATRIC SERVICE  Episodic/Acute/Follow-Up  [place] MRN: [id number]. Place of Service where encounter took place:  [facility] ([facility]) [[id number]]   Chief Complaint   Patient presents with    RECHECK    HPI: Clarke Moreira  is a 74 year old (1950), who is being seen today for an episodic care visit. Today's concern is:    TCU Course:  12/14: TCU Admission. ([facility]. Fall, CHF, knee pain).  12/16: Admit visit. Coumadin dosed.  12/19: Follow-up visit.   12/23: Follow-up visit. Coumadin dosed.   12/30: Follow-up visit. Labs ordered. Fluid restriction counseling.   1/2/25: Follow-up visit. Coumadin dosed. Lymphedema therapy eval'd.   1/4/25: COVID+. Starting Molnupiravir.     Clarke seen today on routine follow up as he continues to rehab in TCU.     Today, Clarke says he is doing pretty good with COVID.  He denies a new fever, but did have a fever a few days ago.  He gets occasional chills but he thinks it is weather related or environmental related in the room.  He denies a headache, or any significant dizziness.  Sometimes he gets short of breath but he does not think it is more than usual.  He denies any chest pain or palpitations.  He thinks his lower extremity swelling is about the same, maybe a little bit more than usual, but he notes his diuretic regimen has changed recently.  He denies any constipation or diarrhea, and he is not having any nausea or upset stomach.  Overall, he has a fair appetite, but he does not find the food at the facility appealing.  He is sleeping very soundly, even though he does not have his CPAP in house.    Past Medical and Surgical History reviewed in Epic today.  MEDICATIONS:  Current Outpatient Medications   Medication Sig Dispense Refill    acetaminophen (TYLENOL) 325 MG tablet Take 2 tablets (650 mg) by mouth every 4 hours as needed for mild pain or other (and adjunct with moderate or severe pain or per patient request).      aspirin (ASA) 81 MG  chewable tablet Take 1 tablet (81 mg) by mouth daily. 200 tablet 2    atorvastatin (LIPITOR) 40 MG tablet Take 1 tablet (40 mg) by mouth daily 90 tablet 3    CERTAVITE/ANTIOXIDANTS tablet TAKE ONE TABLET BY MOUTH ONE TIME DAILY 100 tablet 0    ferrous sulfate (FEROSUL) 325 (65 Fe) MG tablet Take 1 tablet (325 mg) by mouth every other day. For iron deficiency anemia 60 tablet 1    levothyroxine (SYNTHROID/LEVOTHROID) 175 MCG tablet Take 1 tablet (175 mcg) by mouth every morning (before breakfast) 90 tablet 3    magnesium oxide (MAG-OX) 400 MG tablet Take 1 tablet (400 mg) by mouth daily. 30 tablet 0    metFORMIN (GLUCOPHAGE) 500 MG tablet Take 1 tablet (500 mg) by mouth 2 times daily (with meals). 180 tablet 3    metoprolol succinate ER (TOPROL XL) 100 MG 24 hr tablet Take 1 tablet (100 mg) by mouth daily.      Omega-3 Fatty Acids (EQL FISH OIL) 1000 MG CAPS Take 1 capsule by mouth daily 90 capsule 0    potassium chloride chen ER (KLOR-CON M20) 20 MEQ CR tablet Take 2 tablets (40 mEq) by mouth 2 times daily.      senna-docusate (SENEXON-S) 8.6-50 MG tablet Take 1 to 2 tablets by mouth 2 times daily as needed for constipation 180 tablet 0    spironolactone (ALDACTONE) 50 MG tablet Take 1 tablet (50 mg) by mouth daily.      torsemide (DEMADEX) 20 MG tablet Take 60 mg by mouth daily.      urea (CARMOL) 10 % external lotion Apply topically 2 times daily as needed for dry skin.      VITAMIN A PO Take 1 tablet by mouth daily.      vitamin B complex with vitamin C (VITAMIN  B COMPLEX) tablet Take 1 tablet by mouth daily 100 tablet 3    vitamin C (ASCORBIC ACID) 1000 MG TABS Take 1,000 mg by mouth daily      Vitamin D, Cholecalciferol, 25 MCG (1000 UT) CAPS Take 1 capsule by mouth daily.      vitamin E (VITAMIN E COMPLEX) 1000 units (450 mg) capsule Take 1 capsule (1,000 Units) by mouth daily 100 capsule 3    warfarin ANTICOAGULANT (COUMADIN) 3 MG tablet Take by mouth 8 mg (5 mg x 1 tablet and 3 mg x 1 tablet) every day OR  as directed by INR clinic 90 tablet 1    warfarin ANTICOAGULANT (COUMADIN) 5 MG tablet Take by mouth 8 mg (5 mg x 1 tablet and 3 mg x 1 tablet) every day OR as directed by INR clinic 90 tablet 1     Objective: /71   Pulse 79   Temp 97.9  F (36.6  C)   Resp 18   Wt (!) 163.2 kg (359 lb 11.2 oz)   SpO2 96%   BMI 50.17 kg/m    Weights:          Exam:  GENERAL APPEARANCE: Alert, in no distress, cooperative.   RESP: Respiratory effort good, no respiratory distress, Lung sounds clear/diminished. On RA.   CV: Auscultation of heart reveals S1, S2, rate controlled and rhythm irregular, no murmur, no rub or gallop, Edema 3-4+ BLE. Peripheral pulses are 2+.  PSYCH: Insight, judgement, and memory are baseline, affect and mood are happy/engaged.    ASSESSMENT/PLAN:  COVID  Chronic heart failure with preserved ejection fraction (H)  Long term current use of anticoagulant therapy  Chronic respiratory failure with hypercapnia (H)  Moderate episode of recurrent major depressive disorder (H)  Type 2 diabetes mellitus with diabetic polyneuropathy, without long-term current use of insulin (H)  Body mass index (BMI) 50.0-59.9, adult (H)  Acute on chronic. Ongoing.  Provider reviewed records from facility, and interpreted most recent imaging/lab work (CBC, BMP), and vital signs, each with their own characteristics requiring MDM.  Provider discussed care with nursing, , and rehab team:  Nursing reports concern that during medication change with diuretics, that today there is no scheduled diuretics.  Normally, he had been getting up to 100 mg a day, but most recently he was getting 60 mg/day.  They are concerned if he gets nothing today that he will continue to gain weight and have CHF symptoms increased while he has COVID-19.  Rehab team reports a SLUMS of 27/30.  He is receiving lymphedema therapy and participating in other therapies as well.   team has recommended [facility]entiary in the past during last  admission, 5+ falls in the last few months at home.  Social work is working on MA application.  Patient is day 3 of COVID-19 illness with mild symptoms.  Given his underlying conditions, molnupiravir was started.  This has not arrived from the pharmacy as of yet, and provider educated patient on the importance of using this to prevent severe illness.  Several records reviewed related to diuretics which have been fluctuating.  On new round of diuretics is starting tomorrow with orders already in place.  It is unclear why a day would be skipped and dosing.  Believe that missing a day of dosing could place Clarke at risk for CHF exacerbation.  Give one-time torsemide dose today as noted below, and resume dosing that is already ordered starting tomorrow.  Trend lab work routinely.  Continue daily weights, and noting overall increase from the trend above.  Atrial fibrillation is rate controlled, and Clarke is appropriately anticoagulated with Coumadin which was dosed today.  Also anticoagulation beneficial in the setting of COVID-19.  Provider questioned if Clarke's CPAP could be brought to the facility.  This may aid in promoting lung function and rest during COVID-19 and prevention of the progression of other illnesses.  Clarke did not want to address this, and he says he is fine without it.  Diabetes appears well-controlled with the use of metformin.  Continue.  Noting significant polypharmacy which may lead to drug interactions and side effects.  On follow-up, would recommend consideration of discontinuing the following:  Fish oil  Bcomplex.  Vitamin E.  Vitamin A.   Follow up w/in 1 week or as needed.    Orders:  Torsemide 60mg PO x1 today. Dx: CHF.  Coumadin/INR already dosed.     Total time spent with patient visit was 45 min including patient visit and review of past records, and care coordination/discussion with the above parties.     Electronically signed by:  Dr. Shanice Bledsoe, REAGAN CNP DNP

## 2025-01-06 NOTE — TELEPHONE ENCOUNTER
"Provider: REAGAN Ugarte CNP   Facility: [facility]   Facility Type:  TCU    Caller: Alycia  Call Back Number: [phone]  Reason for call: INR  Diagnosis/Goal: A. Fib  Heparin/Lovenox:  No  Currently on ABX?: No  Other interacting medication:  None  Missed or refused doses: No    Date INR Previous Dose/Orders   12/24 2.9 9 mg T/F and 10 mg AOD   12/30 3.4 Held x 1   12/31  unknown   1/2 2.4 9 mg daily   1/6 2.4      Telephone encounter sent to: REAGAN Ugarte CNP     Requesting orders for Warfarin dosing and date of next INR draw  Please respond to \"Geriatrics Nurse Pool\".    Trang Mixon RN  "

## 2025-01-06 NOTE — LETTER
1/6/2025      Clarke Moreira  [address]  [place] [address]        [facility] GERIATRIC SERVICE  Episodic/Acute/Follow-Up  [place] MRN: [id number]. Place of Service where encounter took place:  [facility] ([facility]) [[id number]]   Chief Complaint   Patient presents with     RECHECK    HPI: Calrke Moreira  is a 74 year old (1950), who is being seen today for an episodic care visit. Today's concern is:    TCU Course:  12/14: TCU Admission. ([facility]. Fall, CHF, knee pain).  12/16: Admit visit. Coumadin dosed.  12/19: Follow-up visit.   12/23: Follow-up visit. Coumadin dosed.   12/30: Follow-up visit. Labs ordered. Fluid restriction counseling.   1/2/25: Follow-up visit. Coumadin dosed. Lymphedema therapy eval'd.   1/4/25: COVID+. Starting Molnupiravir.     Clarke seen today on routine follow up as he continues to rehab in TCU.     Today, Clarke says he is doing pretty good with COVID.  He denies a new fever, but did have a fever a few days ago.  He gets occasional chills but he thinks it is weather related or environmental related in the room.  He denies a headache, or any significant dizziness.  Sometimes he gets short of breath but he does not think it is more than usual.  He denies any chest pain or palpitations.  He thinks his lower extremity swelling is about the same, maybe a little bit more than usual, but he notes his diuretic regimen has changed recently.  He denies any constipation or diarrhea, and he is not having any nausea or upset stomach.  Overall, he has a fair appetite, but he does not find the food at the facility appealing.  He is sleeping very soundly, even though he does not have his CPAP in house.    Past Medical and Surgical History reviewed in Epic today.  MEDICATIONS:  Current Outpatient Medications   Medication Sig Dispense Refill     acetaminophen (TYLENOL) 325 MG tablet Take 2 tablets (650 mg) by mouth every 4 hours as needed for mild pain or other  (and adjunct with moderate or severe pain or per patient request).       aspirin (ASA) 81 MG chewable tablet Take 1 tablet (81 mg) by mouth daily. 200 tablet 2     atorvastatin (LIPITOR) 40 MG tablet Take 1 tablet (40 mg) by mouth daily 90 tablet 3     CERTAVITE/ANTIOXIDANTS tablet TAKE ONE TABLET BY MOUTH ONE TIME DAILY 100 tablet 0     ferrous sulfate (FEROSUL) 325 (65 Fe) MG tablet Take 1 tablet (325 mg) by mouth every other day. For iron deficiency anemia 60 tablet 1     levothyroxine (SYNTHROID/LEVOTHROID) 175 MCG tablet Take 1 tablet (175 mcg) by mouth every morning (before breakfast) 90 tablet 3     magnesium oxide (MAG-OX) 400 MG tablet Take 1 tablet (400 mg) by mouth daily. 30 tablet 0     metFORMIN (GLUCOPHAGE) 500 MG tablet Take 1 tablet (500 mg) by mouth 2 times daily (with meals). 180 tablet 3     metoprolol succinate ER (TOPROL XL) 100 MG 24 hr tablet Take 1 tablet (100 mg) by mouth daily.       Omega-3 Fatty Acids (EQL FISH OIL) 1000 MG CAPS Take 1 capsule by mouth daily 90 capsule 0     potassium chloride chen ER (KLOR-CON M20) 20 MEQ CR tablet Take 2 tablets (40 mEq) by mouth 2 times daily.       senna-docusate (SENEXON-S) 8.6-50 MG tablet Take 1 to 2 tablets by mouth 2 times daily as needed for constipation 180 tablet 0     spironolactone (ALDACTONE) 50 MG tablet Take 1 tablet (50 mg) by mouth daily.       torsemide (DEMADEX) 20 MG tablet Take 60 mg by mouth daily.       urea (CARMOL) 10 % external lotion Apply topically 2 times daily as needed for dry skin.       VITAMIN A PO Take 1 tablet by mouth daily.       vitamin B complex with vitamin C (VITAMIN  B COMPLEX) tablet Take 1 tablet by mouth daily 100 tablet 3     vitamin C (ASCORBIC ACID) 1000 MG TABS Take 1,000 mg by mouth daily       Vitamin D, Cholecalciferol, 25 MCG (1000 UT) CAPS Take 1 capsule by mouth daily.       vitamin E (VITAMIN E COMPLEX) 1000 units (450 mg) capsule Take 1 capsule (1,000 Units) by mouth daily 100 capsule 3      warfarin ANTICOAGULANT (COUMADIN) 3 MG tablet Take by mouth 8 mg (5 mg x 1 tablet and 3 mg x 1 tablet) every day OR as directed by INR clinic 90 tablet 1     warfarin ANTICOAGULANT (COUMADIN) 5 MG tablet Take by mouth 8 mg (5 mg x 1 tablet and 3 mg x 1 tablet) every day OR as directed by INR clinic 90 tablet 1     Objective: /71   Pulse 79   Temp 97.9  F (36.6  C)   Resp 18   Wt (!) 163.2 kg (359 lb 11.2 oz)   SpO2 96%   BMI 50.17 kg/m    Weights:          Exam:  GENERAL APPEARANCE: Alert, in no distress, cooperative.   RESP: Respiratory effort good, no respiratory distress, Lung sounds clear/diminished. On RA.   CV: Auscultation of heart reveals S1, S2, rate controlled and rhythm irregular, no murmur, no rub or gallop, Edema 3-4+ BLE. Peripheral pulses are 2+.  PSYCH: Insight, judgement, and memory are baseline, affect and mood are happy/engaged.    ASSESSMENT/PLAN:  COVID  Chronic heart failure with preserved ejection fraction (H)  Long term current use of anticoagulant therapy  Chronic respiratory failure with hypercapnia (H)  Moderate episode of recurrent major depressive disorder (H)  Type 2 diabetes mellitus with diabetic polyneuropathy, without long-term current use of insulin (H)  Body mass index (BMI) 50.0-59.9, adult (H)  Acute on chronic. Ongoing.  Provider reviewed records from facility, and interpreted most recent imaging/lab work (CBC, BMP), and vital signs, each with their own characteristics requiring MDM.  Provider discussed care with nursing, , and rehab team:  Nursing reports concern that during medication change with diuretics, that today there is no scheduled diuretics.  Normally, he had been getting up to 100 mg a day, but most recently he was getting 60 mg/day.  They are concerned if he gets nothing today that he will continue to gain weight and have CHF symptoms increased while he has COVID-19.  Rehab team reports a SLUMS of 27/30.  He is receiving lymphedema  therapy and participating in other therapies as well.   team has recommended halfway in the past during last admission, 5+ falls in the last few months at home.  Social work is working on MA application.  Patient is day 3 of COVID-19 illness with mild symptoms.  Given his underlying conditions, molnupiravir was started.  This has not arrived from the pharmacy as of yet, and provider educated patient on the importance of using this to prevent severe illness.  Several records reviewed related to diuretics which have been fluctuating.  On new round of diuretics is starting tomorrow with orders already in place.  It is unclear why a day would be skipped and dosing.  Believe that missing a day of dosing could place Clarke at risk for CHF exacerbation.  Give one-time torsemide dose today as noted below, and resume dosing that is already ordered starting tomorrow.  Trend lab work routinely.  Continue daily weights, and noting overall increase from the trend above.  Atrial fibrillation is rate controlled, and Clarke is appropriately anticoagulated with Coumadin which was dosed today.  Also anticoagulation beneficial in the setting of COVID-19.  Provider questioned if Clarke's CPAP could be brought to the facility.  This may aid in promoting lung function and rest during COVID-19 and prevention of the progression of other illnesses.  Clarke did not want to address this, and he says he is fine without it.  Diabetes appears well-controlled with the use of metformin.  Continue.  Noting significant polypharmacy which may lead to drug interactions and side effects.  On follow-up, would recommend consideration of discontinuing the following:  Fish oil  Bcomplex.  Vitamin E.  Vitamin A.   Follow up w/in 1 week or as needed.    Orders:  Torsemide 60mg PO x1 today. Dx: CHF.  Coumadin/INR already dosed.     Total time spent with patient visit was 45 min including patient visit and review of past records, and care  coordination/discussion with the above parties.     Electronically signed by:  Dr. Shanice Bledsoe, REAGAN CNP DNP        Sincerely,        REAGAN Pena CNP    Electronically signed

## 2025-01-07 ENCOUNTER — DOCUMENTATION ONLY (OUTPATIENT)
Dept: FAMILY MEDICINE | Facility: CLINIC | Age: 75
End: 2025-01-07
Payer: COMMERCIAL

## 2025-01-07 NOTE — PROGRESS NOTES
"When opening a documentation only encounter, be sure to enter in \"Chief Complaint\" Forms and in \" Comments\" Title of form, description if needed.    Angel is a 74 year old  male  Form received via: Fax  Form now resides in: Provider Ready    Erna Schwartz MA             Form has been completed by provider.     Form sent out via: Fax to [facility] at Fax Number:  [phone]  Patient informed: N/A  Output date: January 8, 2025    Erna Schwartz MA      **Please close the encounter**   "

## 2025-01-08 ENCOUNTER — LAB REQUISITION (OUTPATIENT)
Dept: LAB | Facility: CLINIC | Age: 75
End: 2025-01-08
Payer: COMMERCIAL

## 2025-01-08 ENCOUNTER — TRANSITIONAL CARE UNIT VISIT (OUTPATIENT)
Dept: GERIATRICS | Facility: CLINIC | Age: 75
End: 2025-01-08
Payer: COMMERCIAL

## 2025-01-08 VITALS
OXYGEN SATURATION: 96 % | TEMPERATURE: 97.8 F | HEART RATE: 84 BPM | DIASTOLIC BLOOD PRESSURE: 78 MMHG | SYSTOLIC BLOOD PRESSURE: 126 MMHG | BODY MASS INDEX: 50.15 KG/M2 | WEIGHT: 315 LBS | RESPIRATION RATE: 18 BRPM

## 2025-01-08 DIAGNOSIS — G47.33 OSA (OBSTRUCTIVE SLEEP APNEA): ICD-10-CM

## 2025-01-08 DIAGNOSIS — E11.42 TYPE 2 DIABETES MELLITUS WITH DIABETIC POLYNEUROPATHY, WITHOUT LONG-TERM CURRENT USE OF INSULIN (H): ICD-10-CM

## 2025-01-08 DIAGNOSIS — E03.9 HYPOTHYROIDISM, UNSPECIFIED TYPE: ICD-10-CM

## 2025-01-08 DIAGNOSIS — I48.91 ATRIAL FIBRILLATION, UNSPECIFIED TYPE (H): ICD-10-CM

## 2025-01-08 DIAGNOSIS — I50.33 ACUTE ON CHRONIC HEART FAILURE WITH PRESERVED EJECTION FRACTION (HFPEF) (H): ICD-10-CM

## 2025-01-08 DIAGNOSIS — J96.12 CHRONIC RESPIRATORY FAILURE WITH HYPERCAPNIA (H): ICD-10-CM

## 2025-01-08 DIAGNOSIS — N18.2 CKD (CHRONIC KIDNEY DISEASE) STAGE 2, GFR 60-89 ML/MIN: ICD-10-CM

## 2025-01-08 DIAGNOSIS — D64.9 CHRONIC ANEMIA: ICD-10-CM

## 2025-01-08 DIAGNOSIS — E87.1 HYPONATREMIA: ICD-10-CM

## 2025-01-08 DIAGNOSIS — E66.01 MORBID OBESITY (H): ICD-10-CM

## 2025-01-08 DIAGNOSIS — U07.1 COVID: Primary | ICD-10-CM

## 2025-01-08 DIAGNOSIS — F32.A DEPRESSION, UNSPECIFIED DEPRESSION TYPE: ICD-10-CM

## 2025-01-08 DIAGNOSIS — E87.6 HYPOKALEMIA: ICD-10-CM

## 2025-01-08 DIAGNOSIS — R53.81 PHYSICAL DECONDITIONING: ICD-10-CM

## 2025-01-08 PROCEDURE — 99310 SBSQ NF CARE HIGH MDM 45: CPT | Performed by: NURSE PRACTITIONER

## 2025-01-08 NOTE — LETTER
1/8/2025      Clarke Moreira  [address]  [facility] [address]        [facility] GERIATRICS    Chief Complaint   Patient presents with     RECHECK     HPI:  Clarke Moreira is a 74 year old  (1950), who is being seen today for an episodic care visit at: [facility] ([facility]) [[id number]].     Past medical history significant for HFpEF, atrial fibrillation, dyslipidemia, chronic respiratory failure, type 2 diabetes, chronic anemia, CKD, hypothyroidism, BPH, depression, THONG, obesity    Patient is residing in the U following hospitalization at [facility] from 10/25/2024 to 11/2/2024 for weakness.  Most recently patient tested positive for COVID on 1/4/2025 and was started on molnupiravir.  Torsemide dose has been adjusted recently due to acute CHF.    Today patient was seen for follow-up in the TCU.  He is sitting up in his wheelchair.  He reports that he is feeling fluid up, he notes significant increase in fluid retention in his abdomen and also has increased lower extremity swelling.  Weight has also been up.  He denies any shortness of breath.  He reports that up until past few months he has been taking 100 mg of torsemide daily, at some point it was reduced down to 80 mg and then again to 60, however he does not recall exact details for this.  He tells me that he became dehydrated, which is why his dose was reduced down to 80 mg.  He does not believe the 60 mg dose is enough for maintenance.  We discussed torsemide 100 mg x 3 days, and would like to take this in the afternoon, which has been long-term administration 9.9 years, then reduce to 80 mg daily, which he will likely need to stay on long-term.  Patient tells me that he possibly will be discharging next week, though no official date has been set.  He denies any cough, fever/chills.  He tells me that he overall feels good.  Last bowel movement was today.  He denies dysuria/trouble voiding.  Patient continues to work with  therapy.      Reviewed facility EMR including medications, recent nursing progress notes, vital signs.  Discussed plan of care with nursing.    Allergies, and PMH/PSH reviewed in EPIC today.  REVIEW OF SYSTEMS:  5 point ROS of systems including Constitutional, Respiratory, Cardiovascular, Gastroenterology, Genitourinary were all negative except for pertinent positives noted in my HPI.    Objective:   /78   Pulse 84   Temp 97.8  F (36.6  C)   Resp 18   Wt (!) 163.1 kg (359 lb 9.6 oz)   SpO2 96%   BMI 50.15 kg/m    GENERAL APPEARANCE:  Alert, in NAD  HEENT: normocephalic, moist mucous membranes, nose without drainage or crusting  RESP:  respiratory effort normal, no respiratory distress, Lung sounds clear, patient is on RA  CV: auscultation of heart done, rate and rhythm regular.   ABDOMEN: obese, + bowel sounds, soft, nontender, no grimacing or guarding with palpation.  M/S:   1+ lower extremity edema  SKIN:  Inspection and palpation of skin and subcutaneous tissue: skin warm, dry without rashes  NEURO: cranial nerves 2-12 grossly intact and at patient's baseline; no facial asymmetry, no speech deficits and able to follow directions  PSYCH: affect and mood normal      Labs done in SNF are in [facility]. Please refer to them using [employer]/Care Everywhere. and Recent labs in [facility] reviewed by me today.     Assessment/Plan:  COVID-19  Chronic respiratory failure with hypercapnia  Patient diagnosed with COVID on 1/4/2024 and started on molnupiravir  Respiratory status stable, oxygen saturations 93-98% on RA  Plan: Continue molnupiravir 800 mg twice daily x 5 days through 11/10/2024.  Monitor respiratory status closely.  Continue supportive care.  Patient to remain in transmission based precautions x 10 days from positive test result.    Acute on Chronic heart failure with preserved ejection fraction, LVEF 55-60% per echo in 10/2024  Per review of recent notes, sounds like patient had not been receiving  torsemide on regular basis, is now on scheduled torsemide 60 mg daily  Per hospital discharge orders, PTA torsemide 60 mg daily and spironolactone 50 mg daily  Per review of MAR patient received 100 mg daily of torsemide on 1/1-1/2, then 60 mg BID on 1/4-1/5, then 60 mg daily on 1/6  Patient notes that he was maintaining on dose of 100 mg daily for long time up until more recently, the dose was cut to 80 mg and then down to 60  359.6 lb on 1/7 with mechanical lift- weight down from 362.9 ln on 1/2, admit weight 342.7 lb, leg swelling +1 and increased abdominal distension and fluid, lungs are clear  Plan: Increase torsemide 100 mg daily x3 days starting today and then down to torsemide 80 mg daily and will likely need to maintain this dose long term. Continue spironolactone 50 mg daily and  potassium 40 mill equivalents twice daily. No recent lab work since receiving extra doses of torsemide since last week so potassium level tomorrow and then BMP on 1/10. Daily weights. Notify provider with weight gain >2 lbs in a day, >5 lbs in on week.     Atrial fibrillation  HR 70-80s mostly  Plan: Continue metoprolol  mg daily and Coumadin as ordered.  INR to be drawn as scheduled.  Goal INR 2-3.  Monitor heart rate.    Type 2 diabetes  Hemoglobin A1c 6.2% on 10/7/2024  Plan: Continue metformin 500 mg twice daily. Monitor BS    Chronic anemia  Baseline hemoglobin around 11  Hemoglobin 11.3 on 12/20/2024  Plan: CBC as needed.  Continue ferrous sulfate 325 mg every other day.  Monitor for signs and symptoms of bleeding.    Hyponatremia  Intermittent, mild  Sodium 134 on 1/2/2025  Plan: BMP as needed    CKD stage stage II  Baseline creatinine around 1-1.1  Creatinine 1.46 on 1/2/2025  Plan: BMP 1/10. Avoid nephrotoxins. Renally dose medications as indicated.    Hypothyroidism  Plan: Continue levothyroxine 175 mcg daily.    Depression  Plan: Not currently on medical management.  ACP following. Monitor mood and  symptoms.    THONG  Morbid obesity, BMI 50.17  Complicates care  Plan: Encourage weight loss.  Dietitian follows in the TCU.  Patient does not have CPAP at facility, continue oxygen, monitor oxygen saturations as needed.    Physical deconditioning  Secondary to recent hospitalization, medical conditions as above  Patient continues to work with therapy  Plan: Encourage participation in physical therapy/occupational therapy for strengthening and deconditioning. Discharge planning per their recommendation. Social work to assist with d/c planning.      MED REC REQUIRED  Post Medication Reconciliation Status:  Discharge medications reconciled and changed, see notes/orders    Disclaimer: This note may contain text created using speech-recognition software and may contain unintended word substitutions.        Total time spent with patient visit at the skilled nursing [facility] was 47 minutes including patient visit,  review of past records, discussion with nursing staff ,writing of orders at facility.      Electronically signed by: REGAAN Asher CNP               Sincerely,        REAGAN Asher CNP    Electronically signed

## 2025-01-08 NOTE — PROGRESS NOTES
[facility] GERIATRICS    Chief Complaint   Patient presents with    RECHECK     HPI:  Clarke Moreira is a 74 year old  (1950), who is being seen today for an episodic care visit at: [facility] ([facility]) [[id number]].     Past medical history significant for HFpEF, atrial fibrillation, dyslipidemia, chronic respiratory failure, type 2 diabetes, chronic anemia, CKD, hypothyroidism, BPH, depression, THONG, obesity    Patient is residing in the U following hospitalization at [facility] from 10/25/2024 to 11/2/2024 for weakness.  Most recently patient tested positive for COVID on 1/4/2025 and was started on molnupiravir.  Torsemide dose has been adjusted recently due to acute CHF.    Today patient was seen for follow-up in the TCU.  He is sitting up in his wheelchair.  He reports that he is feeling fluid up, he notes significant increase in fluid retention in his abdomen and also has increased lower extremity swelling.  Weight has also been up.  He denies any shortness of breath.  He reports that up until past few months he has been taking 100 mg of torsemide daily, at some point it was reduced down to 80 mg and then again to 60, however he does not recall exact details for this.  He tells me that he became dehydrated, which is why his dose was reduced down to 80 mg.  He does not believe the 60 mg dose is enough for maintenance.  We discussed torsemide 100 mg x 3 days, and would like to take this in the afternoon, which has been long-term administration 9.9 years, then reduce to 80 mg daily, which he will likely need to stay on long-term.  Patient tells me that he possibly will be discharging next week, though no official date has been set.  He denies any cough, fever/chills.  He tells me that he overall feels good.  Last bowel movement was today.  He denies dysuria/trouble voiding.  Patient continues to work with therapy.      Reviewed facility EMR including medications, recent nursing progress notes,  vital signs.  Discussed plan of care with nursing.    Allergies, and PMH/PSH reviewed in EPIC today.  REVIEW OF SYSTEMS:  5 point ROS of systems including Constitutional, Respiratory, Cardiovascular, Gastroenterology, Genitourinary were all negative except for pertinent positives noted in my HPI.    Objective:   /78   Pulse 84   Temp 97.8  F (36.6  C)   Resp 18   Wt (!) 163.1 kg (359 lb 9.6 oz)   SpO2 96%   BMI 50.15 kg/m    GENERAL APPEARANCE:  Alert, in NAD  HEENT: normocephalic, moist mucous membranes, nose without drainage or crusting  RESP:  respiratory effort normal, no respiratory distress, Lung sounds clear, patient is on RA  CV: auscultation of heart done, rate and rhythm regular.   ABDOMEN: obese, + bowel sounds, soft, nontender, no grimacing or guarding with palpation.  M/S:   1+ lower extremity edema  SKIN:  Inspection and palpation of skin and subcutaneous tissue: skin warm, dry without rashes  NEURO: cranial nerves 2-12 grossly intact and at patient's baseline; no facial asymmetry, no speech deficits and able to follow directions  PSYCH: affect and mood normal      Labs done in SNF are in [facility]. Please refer to them using [employer]/Care Everywhere. and Recent labs in [facility] reviewed by me today.     Assessment/Plan:  COVID-19  Chronic respiratory failure with hypercapnia  Patient diagnosed with COVID on 1/4/2024 and started on molnupiravir  Respiratory status stable, oxygen saturations 93-98% on RA  Plan: Continue molnupiravir 800 mg twice daily x 5 days through 11/10/2024.  Monitor respiratory status closely.  Continue supportive care.  Patient to remain in transmission based precautions x 10 days from positive test result.    Acute on Chronic heart failure with preserved ejection fraction, LVEF 55-60% per echo in 10/2024  Per review of recent notes, sounds like patient had not been receiving torsemide on regular basis, is now on scheduled torsemide 60 mg daily  Per hospital discharge  orders, PTA torsemide 60 mg daily and spironolactone 50 mg daily  Per review of MAR patient received 100 mg daily of torsemide on 1/1-1/2, then 60 mg BID on 1/4-1/5, then 60 mg daily on 1/6  Patient notes that he was maintaining on dose of 100 mg daily for long time up until more recently, the dose was cut to 80 mg and then down to 60  359.6 lb on 1/7 with mechanical lift- weight down from 362.9 ln on 1/2, admit weight 342.7 lb, leg swelling +1 and increased abdominal distension and fluid, lungs are clear  Plan: Increase torsemide 100 mg daily x3 days starting today and then down to torsemide 80 mg daily and will likely need to maintain this dose long term. Continue spironolactone 50 mg daily and  potassium 40 mill equivalents twice daily. No recent lab work since receiving extra doses of torsemide since last week so potassium level tomorrow and then BMP on 1/10. Daily weights. Notify provider with weight gain >2 lbs in a day, >5 lbs in on week.     Atrial fibrillation  HR 70-80s mostly  Plan: Continue metoprolol  mg daily and Coumadin as ordered.  INR to be drawn as scheduled.  Goal INR 2-3.  Monitor heart rate.    Type 2 diabetes  Hemoglobin A1c 6.2% on 10/7/2024  Plan: Continue metformin 500 mg twice daily. Monitor BS    Chronic anemia  Baseline hemoglobin around 11  Hemoglobin 11.3 on 12/20/2024  Plan: CBC as needed.  Continue ferrous sulfate 325 mg every other day.  Monitor for signs and symptoms of bleeding.    Hyponatremia  Intermittent, mild  Sodium 134 on 1/2/2025  Plan: BMP as needed    CKD stage stage II  Baseline creatinine around 1-1.1  Creatinine 1.46 on 1/2/2025  Plan: BMP 1/10. Avoid nephrotoxins. Renally dose medications as indicated.    Hypothyroidism  Plan: Continue levothyroxine 175 mcg daily.    Depression  Plan: Not currently on medical management.  ACP following. Monitor mood and symptoms.    THONG  Morbid obesity, BMI 50.17  Complicates care  Plan: Encourage weight loss.  Dietitian  follows in the TCU.  Patient does not have CPAP at facility, continue oxygen, monitor oxygen saturations as needed.    Physical deconditioning  Secondary to recent hospitalization, medical conditions as above  Patient continues to work with therapy  Plan: Encourage participation in physical therapy/occupational therapy for strengthening and deconditioning. Discharge planning per their recommendation. Social work to assist with d/c planning.      MED REC REQUIRED  Post Medication Reconciliation Status:  Discharge medications reconciled and changed, see notes/orders    Disclaimer: This note may contain text created using speech-recognition software and may contain unintended word substitutions.        Total time spent with patient visit at the skilled nursing facility was 47 minutes including patient visit,  review of past records, discussion with nursing staff ,writing of orders at facility.      Electronically signed by: REAGAN Asher CNP

## 2025-01-09 ENCOUNTER — LAB REQUISITION (OUTPATIENT)
Dept: LAB | Facility: CLINIC | Age: 75
End: 2025-01-09
Payer: COMMERCIAL

## 2025-01-09 DIAGNOSIS — I50.9 HEART FAILURE, UNSPECIFIED (H): ICD-10-CM

## 2025-01-09 LAB — POTASSIUM SERPL-SCNC: 3.4 MMOL/L (ref 3.4–5.3)

## 2025-01-09 PROCEDURE — P9604 ONE-WAY ALLOW PRORATED TRIP: HCPCS | Mod: ORL | Performed by: NURSE PRACTITIONER

## 2025-01-09 PROCEDURE — 84132 ASSAY OF SERUM POTASSIUM: CPT | Mod: ORL | Performed by: NURSE PRACTITIONER

## 2025-01-09 PROCEDURE — 36415 COLL VENOUS BLD VENIPUNCTURE: CPT | Mod: ORL | Performed by: NURSE PRACTITIONER

## 2025-01-10 LAB
ANION GAP SERPL CALCULATED.3IONS-SCNC: 10 MMOL/L (ref 7–15)
BUN SERPL-MCNC: 13.7 MG/DL (ref 8–23)
CALCIUM SERPL-MCNC: 8.6 MG/DL (ref 8.8–10.4)
CHLORIDE SERPL-SCNC: 98 MMOL/L (ref 98–107)
CREAT SERPL-MCNC: 0.96 MG/DL (ref 0.67–1.17)
EGFRCR SERPLBLD CKD-EPI 2021: 83 ML/MIN/1.73M2
GLUCOSE SERPL-MCNC: 128 MG/DL (ref 70–99)
HCO3 SERPL-SCNC: 30 MMOL/L (ref 22–29)
POTASSIUM SERPL-SCNC: 3.7 MMOL/L (ref 3.4–5.3)
SODIUM SERPL-SCNC: 138 MMOL/L (ref 135–145)

## 2025-01-10 PROCEDURE — P9604 ONE-WAY ALLOW PRORATED TRIP: HCPCS | Mod: ORL | Performed by: NURSE PRACTITIONER

## 2025-01-10 PROCEDURE — 80048 BASIC METABOLIC PNL TOTAL CA: CPT | Mod: ORL | Performed by: NURSE PRACTITIONER

## 2025-01-10 PROCEDURE — 36415 COLL VENOUS BLD VENIPUNCTURE: CPT | Mod: ORL | Performed by: NURSE PRACTITIONER

## 2025-01-13 ENCOUNTER — TRANSITIONAL CARE UNIT VISIT (OUTPATIENT)
Dept: GERIATRICS | Facility: CLINIC | Age: 75
End: 2025-01-13
Payer: COMMERCIAL

## 2025-01-13 VITALS
BODY MASS INDEX: 50.13 KG/M2 | DIASTOLIC BLOOD PRESSURE: 71 MMHG | WEIGHT: 315 LBS | RESPIRATION RATE: 20 BRPM | SYSTOLIC BLOOD PRESSURE: 124 MMHG | OXYGEN SATURATION: 97 % | TEMPERATURE: 98.1 F | HEART RATE: 83 BPM

## 2025-01-13 DIAGNOSIS — R53.81 PHYSICAL DECONDITIONING: ICD-10-CM

## 2025-01-13 DIAGNOSIS — F33.1 MODERATE EPISODE OF RECURRENT MAJOR DEPRESSIVE DISORDER (H): ICD-10-CM

## 2025-01-13 DIAGNOSIS — Z91.81 RISK FOR FALLS: ICD-10-CM

## 2025-01-13 DIAGNOSIS — I50.32 CHRONIC HEART FAILURE WITH PRESERVED EJECTION FRACTION (H): ICD-10-CM

## 2025-01-13 DIAGNOSIS — U07.1 COVID: Primary | ICD-10-CM

## 2025-01-13 DIAGNOSIS — Z79.899 POLYPHARMACY: ICD-10-CM

## 2025-01-13 PROCEDURE — 99310 SBSQ NF CARE HIGH MDM 45: CPT | Performed by: NURSE PRACTITIONER

## 2025-01-13 NOTE — LETTER
1/13/2025      Clarke Moreira  [address]  [facility] [address]        [facility] [place] GERIATRIC SERVICE  Episodic/Acute/Follow-Up  [place] MRN: [id number]. Place of Service where encounter took place:  [facility] ([facility]) [[id number]]   Chief Complaint   Patient presents with     RECHECK    HPI: Clarke Moreira  is a 74 year old (1950), who is being seen today for an episodic care visit. Today's concern is:    TCU Course:  12/14: TCU Admission. ([facility]. Fall, CHF, knee pain).  12/16: Admit visit. Coumadin dosed.  12/19: Follow-up visit.   12/23: Follow-up visit. Coumadin dosed.   12/30: Follow-up visit. Labs ordered. Fluid restriction counseling.   1/2/25: Follow-up visit. Coumadin dosed. Lymphedema therapy eval'd.   1/4/25: COVID+. Starting Molnupiravir.   1/6: Follow-up visit. Coumadin/INR dosed, Torsemide 60mg x1 given.   1/8: Follow-up visit. Torsemide adjusted. 80mg/day ongoing.   1/9: Virtual Psych visit.     Clarke seen today on routine follow up as he continues to rehab in TCU.     Today, Clarke is feeling pretty good.  He is not having any new pain, occasionally has a headache, but denies any dizziness.  Initially during COVID and occasionally he does get short of breath, but that is a lot better now and feels torsemide has really been beneficial with recent adjustments.  His appetite is good, he denies nausea or heartburn.  He denies constipation or diarrhea.  He denies any new bladder concerns.  He feels his lower extremity swelling is better.    Past Medical and Surgical History reviewed in Epic today.  MEDICATIONS:  Current Outpatient Medications   Medication Sig Dispense Refill     acetaminophen (TYLENOL) 325 MG tablet Take 2 tablets (650 mg) by mouth every 4 hours as needed for mild pain or other (and adjunct with moderate or severe pain or per patient request).       aspirin (ASA) 81 MG chewable tablet Take 1 tablet (81 mg) by mouth daily. 200 tablet 2      atorvastatin (LIPITOR) 40 MG tablet Take 1 tablet (40 mg) by mouth daily 90 tablet 3     CERTAVITE/ANTIOXIDANTS tablet TAKE ONE TABLET BY MOUTH ONE TIME DAILY 100 tablet 0     ferrous sulfate (FEROSUL) 325 (65 Fe) MG tablet Take 1 tablet (325 mg) by mouth every other day. For iron deficiency anemia 60 tablet 1     levothyroxine (SYNTHROID/LEVOTHROID) 175 MCG tablet Take 1 tablet (175 mcg) by mouth every morning (before breakfast) 90 tablet 3     magnesium oxide (MAG-OX) 400 MG tablet Take 1 tablet (400 mg) by mouth daily. 30 tablet 0     metFORMIN (GLUCOPHAGE) 500 MG tablet Take 1 tablet (500 mg) by mouth 2 times daily (with meals). 180 tablet 3     metoprolol succinate ER (TOPROL XL) 100 MG 24 hr tablet Take 1 tablet (100 mg) by mouth daily.       potassium chloride chen ER (KLOR-CON M20) 20 MEQ CR tablet Take 2 tablets (40 mEq) by mouth 2 times daily.       senna-docusate (SENEXON-S) 8.6-50 MG tablet Take 1 to 2 tablets by mouth 2 times daily as needed for constipation 180 tablet 0     spironolactone (ALDACTONE) 50 MG tablet Take 1 tablet (50 mg) by mouth daily.       torsemide (DEMADEX) 20 MG tablet Take 80 mg by mouth daily.       urea (CARMOL) 10 % external lotion Apply topically 2 times daily as needed for dry skin.       vitamin C (ASCORBIC ACID) 1000 MG TABS Take 1,000 mg by mouth daily       Vitamin D, Cholecalciferol, 25 MCG (1000 UT) CAPS Take 1 capsule by mouth daily.       warfarin ANTICOAGULANT (COUMADIN) 3 MG tablet Take by mouth 8 mg (5 mg x 1 tablet and 3 mg x 1 tablet) every day OR as directed by INR clinic 90 tablet 1     Objective: /71   Pulse 83   Temp 98.1  F (36.7  C)   Resp 20   Wt (!) 163 kg (359 lb 6.4 oz)   SpO2 97%   BMI 50.13 kg/m    Exam:  GENERAL APPEARANCE: Alert, in no distress, cooperative.   RESP: Respiratory effort good, no respiratory distress, Lung sounds clear. On RA.   CV: Auscultation of heart reveals S1, S2, rate controlled and rhythm irregular, no murmur, no rub  or gallop, Edema 2-3+ BLE. Peripheral pulses are 2+.  PSYCH: Insight, judgement, and memory are baseline, affect and mood are happy/engaged.    ASSESSMENT/PLAN:  COVID  Chronic heart failure with preserved ejection fraction (H)  Moderate episode of recurrent major depressive disorder (H)  Risk for falls  Physical deconditioning  Polypharmacy  Acute on chronic. Ongoing.  Provider reviewed records from facility, and interpreted most recent imaging/lab work (Hgb, BMP), and vital signs, each with their own characteristics requiring MDM.  Provider discussed care with , and rehab team:  Rehab team reports that Clarke may have plateaued, but they are hopeful that once he gets out of quarantine on 1/15, they can work with him more vigorously post isolation period.   reports that patient's next insurance review is 1/16 (only 1 day after his quarantine ends).  They have recommended assisted living several times, and Clarke really wants to go home.   will alert us should a discharge be forthcoming.    Provider initiated conversation directly with Clarke about polypharmacy.  He takes a lot of medications, and a lot of them are absolutely needed secondary to his heart failure, and other chronic issues.  He takes a multivitamin, and we discussed how several vitamins and minerals are included in this and he likely does not need additional vitamins/supplements on top of that.  He is agreeable to scaling back.  Discontinue vitamin E, theoretical concerns that this may contribute to bleeding secondary to vitamin E's effect on platelet aggregation.  Higher doses of vitamin E are also linked to higher all cause mortality.  Like most supplements, this requires further research.  Discontinue B complex, B vitamins are often included in multivitamin.  Discontinue vitamin A, this is often already in multivitamins.  Discontinue fish oil, this can contribute to coagulopathy/bleeding risk  (patient is on Coumadin).  Last triglyceride level was very slightly elevated, but grossly within normal limits.  Patient continues on statin.  After discussion with , will include home care orders below in the event that Clarke needs to swiftly discharge later this week.  If this is the case, Clarke should follow-up with his primary care provider within 1 to 2 weeks.  Follow up w/in 1 week or as needed.    Orders:  Discontinue Vitamin E.  Discontinue B-complex.   Discontinue Vitamin A.  Discontinue Fish Oil.     Total time spent with patient visit was 45 min including patient visit and review of past records, and care coordination/discussion with the above parties.     Electronically signed by:  REAGAN Valderrama CNP DNP  ______      Documentation of Face-to-Face and Certification for Home Health Services   Patient: Clarke Moreira   YOB: 1950  MR Number: [id number]  Today's Date: 1/13/2025  I certify that patient: Clarke Moreira is under my care and that I, had a face-to-face encounter that meets the provider face-to-face encounter requirements with this patient on: 1/13/2025.    This encounter with the patient was in whole, or in part, for the following medical condition, which is the primary reason for home health care: CHF.    I certify that, based on my findings, the following services are medically necessary home health services: Nursing, Occupational Therapy, and Physical Therapy.    My clinical findings support the need for the above services because: Nurse is needed: To provide assessment and oversight required in the home to assure adherence to the medical plan due to: complexity.., Occupational Therapy Services are needed to assess and treat cognitive ability and address ADL safety due to impairment in mobility., and Physical Therapy Services are needed to assess and treat the following functional impairments: mobility.    Further, I certify that my clinical  findings support that this patient is homebound (i.e. absences from home require considerable and taxing effort and are for medical reasons or [employer] services or infrequently or of short duration when for other reasons) because: Requires assistance of another person or specialized equipment to access medical services because patient: Range of motion limitations prevents ability to exit home safely...    Based on the above findings. I certify that this patient is confined to the home and needs intermittent skilled nursing care, physical therapy and/or speech therapy.  The patient is under my care, and I have initiated the establishment of the plan of care.  This patient will be followed by a provider who will periodically review the plan of care.  Provider to give follow up care: Henrry Shepard    Responsible Medicare certified PECOS Provider: REAGAN Valderrama CNP [facility]  Provider Signature: See electronic signature associated with these discharge orders.  Date: 1/13/2025        Sincerely,        REAGAN Pena CNP    Electronically signed

## 2025-01-13 NOTE — PROGRESS NOTES
[facility] [place] [facility]  Episodic/Acute/Follow-Up  [place] MRN: [id number]. Place of Service where encounter took place:  [facility] ([facility]) [[id number]]   Chief Complaint   Patient presents with    RECHECK    HPI: Clarke Moreira  is a 74 year old (1950), who is being seen today for an episodic care visit. Today's concern is:    TCU Course:  12/14: TCU Admission. ([facility]. Fall, CHF, knee pain).  12/16: Admit visit. Coumadin dosed.  12/19: Follow-up visit.   12/23: Follow-up visit. Coumadin dosed.   12/30: Follow-up visit. Labs ordered. Fluid restriction counseling.   1/2/25: Follow-up visit. Coumadin dosed. Lymphedema therapy eval'd.   1/4/25: COVID+. Starting Molnupiravir.   1/6: Follow-up visit. Coumadin/INR dosed, Torsemide 60mg x1 given.   1/8: Follow-up visit. Torsemide adjusted. 80mg/day ongoing.   1/9: Virtual Psych visit.     Clarke seen today on routine follow up as he continues to rehab in TCU.     Today, Clarke is feeling pretty good.  He is not having any new pain, occasionally has a headache, but denies any dizziness.  Initially during COVID and occasionally he does get short of breath, but that is a lot better now and feels torsemide has really been beneficial with recent adjustments.  His appetite is good, he denies nausea or heartburn.  He denies constipation or diarrhea.  He denies any new bladder concerns.  He feels his lower extremity swelling is better.    Past Medical and Surgical History reviewed in Epic today.  MEDICATIONS:  Current Outpatient Medications   Medication Sig Dispense Refill    acetaminophen (TYLENOL) 325 MG tablet Take 2 tablets (650 mg) by mouth every 4 hours as needed for mild pain or other (and adjunct with moderate or severe pain or per patient request).      aspirin (ASA) 81 MG chewable tablet Take 1 tablet (81 mg) by mouth daily. 200 tablet 2    atorvastatin (LIPITOR) 40 MG tablet Take 1 tablet (40 mg) by mouth daily 90 tablet 3     CERTAVITE/ANTIOXIDANTS tablet TAKE ONE TABLET BY MOUTH ONE TIME DAILY 100 tablet 0    ferrous sulfate (FEROSUL) 325 (65 Fe) MG tablet Take 1 tablet (325 mg) by mouth every other day. For iron deficiency anemia 60 tablet 1    levothyroxine (SYNTHROID/LEVOTHROID) 175 MCG tablet Take 1 tablet (175 mcg) by mouth every morning (before breakfast) 90 tablet 3    magnesium oxide (MAG-OX) 400 MG tablet Take 1 tablet (400 mg) by mouth daily. 30 tablet 0    metFORMIN (GLUCOPHAGE) 500 MG tablet Take 1 tablet (500 mg) by mouth 2 times daily (with meals). 180 tablet 3    metoprolol succinate ER (TOPROL XL) 100 MG 24 hr tablet Take 1 tablet (100 mg) by mouth daily.      potassium chloride chen ER (KLOR-CON M20) 20 MEQ CR tablet Take 2 tablets (40 mEq) by mouth 2 times daily.      senna-docusate (SENEXON-S) 8.6-50 MG tablet Take 1 to 2 tablets by mouth 2 times daily as needed for constipation 180 tablet 0    spironolactone (ALDACTONE) 50 MG tablet Take 1 tablet (50 mg) by mouth daily.      torsemide (DEMADEX) 20 MG tablet Take 80 mg by mouth daily.      urea (CARMOL) 10 % external lotion Apply topically 2 times daily as needed for dry skin.      vitamin C (ASCORBIC ACID) 1000 MG TABS Take 1,000 mg by mouth daily      Vitamin D, Cholecalciferol, 25 MCG (1000 UT) CAPS Take 1 capsule by mouth daily.      warfarin ANTICOAGULANT (COUMADIN) 3 MG tablet Take by mouth 8 mg (5 mg x 1 tablet and 3 mg x 1 tablet) every day OR as directed by INR clinic 90 tablet 1     Objective: /71   Pulse 83   Temp 98.1  F (36.7  C)   Resp 20   Wt (!) 163 kg (359 lb 6.4 oz)   SpO2 97%   BMI 50.13 kg/m    Exam:  GENERAL APPEARANCE: Alert, in no distress, cooperative.   RESP: Respiratory effort good, no respiratory distress, Lung sounds clear. On RA.   CV: Auscultation of heart reveals S1, S2, rate controlled and rhythm irregular, no murmur, no rub or gallop, Edema 2-3+ BLE. Peripheral pulses are 2+.  PSYCH: Insight, judgement, and memory are  baseline, affect and mood are happy/engaged.    ASSESSMENT/PLAN:  COVID  Chronic heart failure with preserved ejection fraction (H)  Moderate episode of recurrent major depressive disorder (H)  Risk for falls  Physical deconditioning  Polypharmacy  Acute on chronic. Ongoing.  Provider reviewed records from facility, and interpreted most recent imaging/lab work (Hgb, BMP), and vital signs, each with their own characteristics requiring MDM.  Provider discussed care with , and rehab team:  Rehab team reports that Clarke may have plateaued, but they are hopeful that once he gets out of quarantine on 1/15, they can work with him more vigorously post isolation period.   reports that patient's next insurance review is 1/16 (only 1 day after his quarantine ends).  They have recommended assisted living several times, and Clarke really wants to go home.   will alert us should a discharge be forthcoming.    Provider initiated conversation directly with Clarke about polypharmacy.  He takes a lot of medications, and a lot of them are absolutely needed secondary to his heart failure, and other chronic issues.  He takes a multivitamin, and we discussed how several vitamins and minerals are included in this and he likely does not need additional vitamins/supplements on top of that.  He is agreeable to scaling back.  Discontinue vitamin E, theoretical concerns that this may contribute to bleeding secondary to vitamin E's effect on platelet aggregation.  Higher doses of vitamin E are also linked to higher all cause mortality.  Like most supplements, this requires further research.  Discontinue B complex, B vitamins are often included in multivitamin.  Discontinue vitamin A, this is often already in multivitamins.  Discontinue fish oil, this can contribute to coagulopathy/bleeding risk (patient is on Coumadin).  Last triglyceride level was very slightly elevated, but grossly within  normal limits.  Patient continues on statin.  After discussion with , will include home care orders below in the event that Clarke needs to swiftly discharge later this week.  If this is the case, Clarke should follow-up with his primary care provider within 1 to 2 weeks.  Follow up w/in 1 week or as needed.    Orders:  Discontinue Vitamin E.  Discontinue B-complex.   Discontinue Vitamin A.  Discontinue Fish Oil.     Total time spent with patient visit was 45 min including patient visit and review of past records, and care coordination/discussion with the above parties.     Electronically signed by:  Dr. Shanice Bledsoe, REAGAN CNP DNP  ______      Documentation of Face-to-Face and Certification for Home Health Services   Patient: Clarke Moreira   YOB: 1950  MR Number: [id number]  Today's Date: 1/13/2025  I certify that patient: Clarke Moreira is under my care and that I, had a face-to-face encounter that meets the provider face-to-face encounter requirements with this patient on: 1/13/2025.    This encounter with the patient was in whole, or in part, for the following medical condition, which is the primary reason for home health care: CHF.    I certify that, based on my findings, the following services are medically necessary home health services: Nursing, Occupational Therapy, and Physical Therapy.    My clinical findings support the need for the above services because: Nurse is needed: To provide assessment and oversight required in the home to assure adherence to the medical plan due to: complexity.., Occupational Therapy Services are needed to assess and treat cognitive ability and address ADL safety due to impairment in mobility., and Physical Therapy Services are needed to assess and treat the following functional impairments: mobility.    Further, I certify that my clinical findings support that this patient is homebound (i.e. absences from home require considerable and  taxing effort and are for medical reasons or Pent[employer] services or infrequently or of short duration when for other reasons) because: Requires assistance of another person or specialized equipment to access medical services because patient: Range of motion limitations prevents ability to exit home safely...    Based on the above findings. I certify that this patient is confined to the home and needs intermittent skilled nursing care, physical therapy and/or speech therapy.  The patient is under my care, and I have initiated the establishment of the plan of care.  This patient will be followed by a provider who will periodically review the plan of care.  Provider to give follow up care: Henrry Shepard    Responsible Medicare certified PECOS Provider: REAGAN Valderrama CNP DNP  Provider Signature: See electronic signature associated with these discharge orders.  Date: 1/13/2025

## 2025-01-13 NOTE — Clinical Note
FYI, I included a face-to-face documentation in this visit for discharge, just in case insurance issues him a last covered day later this week.  You would just need to sign discharge orders.

## 2025-01-25 ENCOUNTER — HEALTH MAINTENANCE LETTER (OUTPATIENT)
Age: 75
End: 2025-01-25

## 2025-01-30 ENCOUNTER — TELEPHONE (OUTPATIENT)
Dept: ANTICOAGULATION | Facility: CLINIC | Age: 75
End: 2025-01-30
Payer: COMMERCIAL

## 2025-01-30 DIAGNOSIS — Z79.01 LONG TERM (CURRENT) USE OF ANTICOAGULANTS: ICD-10-CM

## 2025-01-30 DIAGNOSIS — I48.20 CHRONIC ATRIAL FIBRILLATION (H): Primary | ICD-10-CM

## 2025-01-30 DIAGNOSIS — I48.0 PAROXYSMAL ATRIAL FIBRILLATION (H): ICD-10-CM

## 2025-01-30 LAB
INR (EXTERNAL): 2.1 (ref 0.9–1.1)
INR (EXTERNAL): 2.3 (ref 0.9–1.1)
INR (EXTERNAL): 2.4 (ref 0.9–1.1)
INR (EXTERNAL): 2.4 (ref 0.9–1.1)

## 2025-01-30 NOTE — TELEPHONE ENCOUNTER
"ANTICOAGULATION     Clarke Moreira is overdue for an INR check.     Called patient to discuss INRs and to confirm if he has home care. Per TCU discharge he should have had home care see him this week but no agency specified. Patient phone line rings with no option for VM, appears per chart review other attempts to reach patient have been unsuccessful.    Appears prior to hospital stay, he was seen by [facility]. Called [facility] and spoke with sami who reports patient is \"pending.\" Transferred to [facility]. They are having the same issues with reaching patient. The goal was to have a phone available to him by Wednesday 1/29/25. Home care says [phone] Dre friend is who to call. Unable to reach out to Dre as no [facility] filled out for [place] to communicate with dre.    Sent [facility] as it appears he responds to these.    Updated calendar with inpatient dosing  Date INR Previous Dose/Orders   1/2 2.4 9 mg daily   1/6 2.4 9 mg daily   1/10 2.1 9 mg daily   1/17 2.3         Discharged on 9 mg daily    Changing responsible pool back to [facility] as home care has not admitted at this time and cannot admit until patient communicates with them for an appt. Sent Cyan[facility]hart asking patient to call either home care to schedule a visit OR Anticoagulation clinic to schedule a lab.      Eliane Jimenez RN  1/30/2025  Anticoagulation Clinic  [facility] for routing messages: benjamin ALBRIGHT  [facility] patient phone line: [phone]  "

## 2025-02-05 DIAGNOSIS — I50.32 CHRONIC HEART FAILURE WITH PRESERVED EJECTION FRACTION (H): ICD-10-CM

## 2025-02-06 RX ORDER — POTASSIUM CHLORIDE 1500 MG/1
40 TABLET, EXTENDED RELEASE ORAL 2 TIMES DAILY
Qty: 120 TABLET | Refills: 0 | Status: SHIPPED | OUTPATIENT
Start: 2025-02-06

## 2025-02-06 NOTE — TELEPHONE ENCOUNTER
"Request for medication refill:  potassium chloride chen ER (KLOR-CON M20) 20 MEQ CR tablet     Providers if patient needs an appointment and you are willing to give a one month supply please refill for one month and  send a letter/MyChart using \".SMILLIMITEDREFILL\" .smillimited and route chart to \"P SMI \" (Giving one month refill in non controlled medications is strongly recommended before denial)    If refill has been denied, meaning absolutely no refills without visit, please complete the smart phrase \".smirxrefuse\" and route it to the \"P SMI MED REFILLS\"  pool to inform the patient and the pharmacy.    Margi Noel CMA      "

## 2025-02-10 ENCOUNTER — DOCUMENTATION ONLY (OUTPATIENT)
Dept: ANTICOAGULATION | Facility: CLINIC | Age: 75
End: 2025-02-10
Payer: COMMERCIAL

## 2025-02-10 DIAGNOSIS — Z79.01 LONG TERM (CURRENT) USE OF ANTICOAGULANTS: ICD-10-CM

## 2025-02-10 DIAGNOSIS — I48.19 PERSISTENT ATRIAL FIBRILLATION (H): ICD-10-CM

## 2025-02-10 DIAGNOSIS — I48.20 CHRONIC ATRIAL FIBRILLATION (H): Primary | ICD-10-CM

## 2025-02-10 DIAGNOSIS — I48.0 PAROXYSMAL ATRIAL FIBRILLATION (H): ICD-10-CM

## 2025-02-10 NOTE — PROGRESS NOTES
ANTICOAGULATION CLINIC REFERRAL RENEWAL REQUEST       An annual renewal order is required for all patients referred to [facility].?  Please review and sign the pended referral order for Clarke Moreira.       ANTICOAGULATION SUMMARY      Warfarin indication(s)   Atrial Fibrillation, chronic, persistent, paroxysmal    Mechanical heart valve present?  NO       Current goal range   INR: 2.0-3.0     Goal appropriate for indication? Goal INR 2-3, standard for indication(s) above     Time in Therapeutic Range (TTR)  (Goal > 60%) 62.0 %       Office visit with referring provider's group within last year yes on 3/15/2-24 with DR. Henrry Mcknight, RN  [facility]

## 2025-02-20 ENCOUNTER — TELEPHONE (OUTPATIENT)
Dept: ANTICOAGULATION | Facility: CLINIC | Age: 75
End: 2025-02-20
Payer: COMMERCIAL

## 2025-02-20 NOTE — TELEPHONE ENCOUNTER
ANTICOAGULATION     Clarke Moreira is overdue for an INR check.     Spoke with Clarke and scheduled lab appointment on 2/25    Mirna Mcknight, RN  2/20/2025  Anticoagulation Clinic  [facility] for routing messages: benjamin LOVE'S  [facility] patient phone line: [phone]

## 2025-03-05 ENCOUNTER — DOCUMENTATION ONLY (OUTPATIENT)
Dept: ANTICOAGULATION | Facility: CLINIC | Age: 75
End: 2025-03-05
Payer: COMMERCIAL

## 2025-03-05 NOTE — LETTER
"     [facility]  [address]  [facility] [address]  Phone: [phone]  Fax: [phone]   March 5, 2025        Clarke Moreira  [address]  [facility] [address]            Dear Clarke,    You are currently under the care of [facility] for your warfarin (Coumadin , Jantoven ) therapy.  We are contacting you because our records show you were due for an INR on end of January 2025..    According to our records, your last INR was in 1/17/25.    There are potentially serious risks when taking warfarin without careful monitoring and we want to make sure you are safely managed.  Routine lab monitoring is required for warfarin refills.     Please schedule a lab appointment as soon as possible either by calling [phone] or scheduling directly in [facility]AM[facility]. To schedule in [facility] open the \"schedule an appointment\" section then select \"lab only\" as the reason you are coming in and \"INR\" as the lab test. If it is difficult for you to get to lab, please call us to discuss options.  If there has been a change in your care or other concerns, please let us know so we can help and/or update our records.         Sincerely,       [facility]    "

## 2025-03-05 NOTE — PROGRESS NOTES
ANTICOAGULATION     Clarke Moreira is overdue for an INR check.     Reminder letter sent    Mirna Mcknight RN  3/5/2025  Anticoagulation Clinic  [facility] for routing messages: benjamin LOVE'S  [facility] patient phone line: [phone]

## 2025-03-10 ENCOUNTER — VIRTUAL VISIT (OUTPATIENT)
Dept: PSYCHOLOGY | Facility: CLINIC | Age: 75
End: 2025-03-10
Payer: COMMERCIAL

## 2025-03-10 DIAGNOSIS — F34.1 PERSISTENT DEPRESSIVE DISORDER: Primary | ICD-10-CM

## 2025-03-10 DIAGNOSIS — F41.1 GAD (GENERALIZED ANXIETY DISORDER): ICD-10-CM

## 2025-03-10 DIAGNOSIS — F33.1 MODERATE EPISODE OF RECURRENT MAJOR DEPRESSIVE DISORDER (H): ICD-10-CM

## 2025-03-10 PROCEDURE — 90837 PSYTX W PT 60 MINUTES: CPT | Mod: 93 | Performed by: PSYCHOLOGIST

## 2025-03-10 NOTE — PROGRESS NOTES
Telemedicine Visit: The patient's condition can be safely assessed and treated via an audio only encounter.      Reason for Telemedicine Visit: Patient has requested telehealth visit and Patient unable to travel    Originating Site (Patient Location): Patient's home    Distant Location (provider location):  On-site    Consent:  The patient/guardian has verbally consented to: the potential risks and benefits of telemedicine (video visit) versus in person care; bill my insurance or make self-payment for services provided; and responsibility for payment of non-covered services.     Mode of Communication:  Telephone call via [employer]    As the provider I attest to compliance with applicable laws and regulations related to telemedicine.    [facility]: : Integrated Behavioral Health    Behavioral Health Clinician Progress Note    Patient Name: Clarke Moreira          Service Type:  Individual      Service Location:   Phone call (patient / identified key support person reached)     Session Start Time: 8:23  Session End Time: 9:24      Session Length: 53 - 60      Attendees: Client     Service Modality:  Phone Visit    Visit Activities (Refresh list every visit): [facility] [place] Only    Diagnostic Assessment Date: last one was 4/1/25  Treatment Plan Review Date: last one was 4/1/25  See Flowsheets for today's PHQ-9 and CHRIS-7 results  Previous PHQ-9:       9/13/2023     9:19 AM 3/15/2024     9:48 AM 7/11/2024     9:37 AM   PHQ-9 SCORE   PHQ-9 Total Score MyChart 24 (Severe depression) 19 (Moderately severe depression) 0   PHQ-9 Total Score 24 19 0     Previous CHRIS-7:       12/11/2018    10:31 AM 5/10/2019     8:26 AM 9/3/2019    10:35 AM   CHRIS-7 SCORE   Total Score 20 13 19     Treatment Objective(s) Addressed in This Session:  Goal 1: Clarke will learn/use coping skills to continue to reduce intensity/frequency of suicidal thoughts     Goal 2: Clarke will challenge/reframe negative harsh  "thoughts that he has about himself.     Current Stressors / Issues:  Clarke has been dealing with a number of stressors since returning home from the [facility], but states physically he is feeling much better.  Stressors include getting his telephone working again as well as trying to get rx benefits with Medicare paid for through [facility].  He has always had the [facility] pay for part B, but recently they started taking $185/month to pay for this coverage.  He has been trying to get a hold of someone at the [facility] to discuss this further.  Calls everyday, but ends up hanging up after being on hold for 1-2 hours.  Increased distress due to the current political environment.  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  Explored how these events run counter to the narrative he typically has that he is not deserving of others' time or that others do not care for him. Focused on challenging these thoughts, reframing and restructuring thoughts to include this additional information.  Discussed strategies to help cope with the despair, uncertainty, and anger he is experiencing now about the current political climate. Feels helpless thinking about the enormity of it all and what is in his control to make better.      Patients Response to treatment Interventions.  Patient was actively engaged in the session and responsive to the interventions utilized    Progress on Treatment Objective(s) / Homework:  Stable      Medication Review:  No current psychiatric medications prescribed    Medication Compliance:  NA    Changes in Health Issues:   Yes: Back home after stay in the TCU, feeling more physically healthy but remains worried that he may fall and end up back in the " [facility].     Chemical Use Review:   Substance Use: No active concerns, has maintained sobriety for many decades     Tobacco Use: No current tobacco use.      Assessment: Current Emotional / Mental Status (status of significant symptoms):  Risk status (Self / Other harm or suicidal ideation)    Patient denies current fears or concerns for personal safety.  Patient denies current or recent suicidal ideation or behaviors.  Patient denies current or recent homicidal ideation or behaviors.  Patient denies current or recent self injurious behavior or ideation.  Patient denies other safety concerns.      Appearance:   N/a telephone visit   Eye Contact:   N/a telephone visit   Psychomotor Behavior: N/a telephone visit   Attitude:   Cooperative  Interested Friendly Attentive  Orientation:   All  Speech   Rate / Production: Normal/ Responsive Talkative   Volume:  Normal   Mood:    Sad   Affect:    Appropriate   Thought Content:  Clear   Thought Form:  Coherent  Logical   Insight:    Good     Diagnoses:  Persistent Depressive Disorder  Major Depression, recurrent, moderate  Generalized Anxiety Disorder    Recommendations & Plan:   Follow up 3/21/25  Will ask [facility] to reach out to talk about forms    Lety Buenrostro PsyD, LP

## 2025-03-10 NOTE — Clinical Note
Luis Graf - I saw Clarke today.  He is getting charged $185/month for medicare part b since getting out of the u. States the [facility] has always paid for this for the last 20 years. Has been calling [facility] every day for a couple of weeks, but can't reach anyone.  Could you please reach out to him to share any other ideas you have about how to determine what needs to happen so he is not having to make that payment each month again? Thank you, Lety

## 2025-03-11 DIAGNOSIS — I50.32 CHRONIC HEART FAILURE WITH PRESERVED EJECTION FRACTION (H): ICD-10-CM

## 2025-03-11 DIAGNOSIS — E11.9 TYPE 2 DIABETES MELLITUS WITHOUT COMPLICATION, WITHOUT LONG-TERM CURRENT USE OF INSULIN (H): ICD-10-CM

## 2025-03-11 DIAGNOSIS — E78.5 HYPERLIPIDEMIA LDL GOAL <100: ICD-10-CM

## 2025-03-11 DIAGNOSIS — I48.0 PAROXYSMAL ATRIAL FIBRILLATION (H): ICD-10-CM

## 2025-03-11 NOTE — TELEPHONE ENCOUNTER
"Not on active med list    Request for medication refill:    Medication Name: Fish Oil    Providers if patient needs an appointment and you are willing to give a one month supply please refill for one month and  send a MyChart using \".SMILLIMITEDREFILL\" .Or route chart to \"P SMI \" . To call patient and inform of limited refill and providers request to make an appointment. (Giving one month refill in non controlled medications is strongly recommended before denial)    If refill has been denied, meaning absolutely no refills without visit, please complete the smart phrase \".SMIRXREFUSE\" and route it to the \"P SMI MED REFILLS\"  pool to inform the patient and the pharmacy.    Teodora Webber RN        "

## 2025-03-11 NOTE — TELEPHONE ENCOUNTER
"Request for medication refill:    Medication Name:   CERTAVITE/ANTIOXIDANTS tablet   KLOR-CON M20 20 MEQ CR tablet   metoprolol succinate ER (TOPROL XL) 100 MG 24 hr tablet     Providers if patient needs an appointment and you are willing to give a one month supply please refill for one month and  send a MyChart using \".SMILLIMITEDREFILL\" .Or route chart to \"P [facility] \" . To call patient and inform of limited refill and providers request to make an appointment. (Giving one month refill in non controlled medications is strongly recommended before denial)    If refill has been denied, meaning absolutely no refills without visit, please complete the smart phrase \".SMIRXREFUSE\" and route it to the \"P [facility] MED REFILLS\"  pool to inform the patient and the pharmacy.    Teodora Webber RN      "

## 2025-03-12 ENCOUNTER — TELEPHONE (OUTPATIENT)
Dept: FAMILY MEDICINE | Facility: CLINIC | Age: 75
End: 2025-03-12
Payer: COMMERCIAL

## 2025-03-12 ENCOUNTER — TELEPHONE (OUTPATIENT)
Dept: CARDIOLOGY | Facility: CLINIC | Age: 75
End: 2025-03-12
Payer: COMMERCIAL

## 2025-03-12 RX ORDER — PYRITHIONE ZINC 1 G/ML
1 SHAMPOO TOPICAL DAILY
Qty: 90 CAPSULE | Refills: 0 | Status: SHIPPED | OUTPATIENT
Start: 2025-03-12

## 2025-03-12 RX ORDER — POTASSIUM CHLORIDE 1500 MG/1
40 TABLET, EXTENDED RELEASE ORAL 2 TIMES DAILY
Qty: 120 TABLET | Refills: 0 | Status: SHIPPED | OUTPATIENT
Start: 2025-03-12

## 2025-03-12 RX ORDER — METOPROLOL SUCCINATE 100 MG/1
100 TABLET, EXTENDED RELEASE ORAL DAILY
Qty: 30 TABLET | Refills: 0 | Status: SHIPPED | OUTPATIENT
Start: 2025-03-12

## 2025-03-12 RX ORDER — MULTIVITAMIN/IRON/FOLIC ACID 18MG-0.4MG
TABLET ORAL
Qty: 100 TABLET | Refills: 0 | Status: SHIPPED | OUTPATIENT
Start: 2025-03-12

## 2025-03-12 NOTE — TELEPHONE ENCOUNTER
called patient to follow up on Medicare Part B billing questions.     Patient reports having to renew his benefits in the months of August/September, though due to being ill, he feels he might have fallen behind on the renewal. Patient states he does not remember receiving any renewal paperwork for his benefits.     Patient states he receives SNAP benefits, as well as a Medicare Part B savings program.     Patient's case number is [id number]. Patient is in agreement with  using the case number provided to contact the [facility] to see what patient's next steps need to be in order for benefits to resume.    [facility] was called using case number, though voicemail was left due to high call volumes.  provided direct number for [facility] to call back.     YAAKOV Hernandez

## 2025-03-12 NOTE — TELEPHONE ENCOUNTER
[facility] Call Center    Phone Message    May a detailed message be left on voicemail: yes     Reason for Call: Appointment Intake    Referring Provider Name: Self used to see Dr Willoughby for Heart Failure  Diagnosis and/or Symptoms: Heart failure, leg swelling to 2x their normal size and lymphedema issues    No appts in 30 days per protocols    Is happy to see anyone who can see him and he needs his appts between 9-11am if possible    Action Taken: Message routed to:  [facility] Cardiology    Travel Screening: Not Applicable     Date of Service:

## 2025-03-17 ENCOUNTER — TELEPHONE (OUTPATIENT)
Dept: FAMILY MEDICINE | Facility: CLINIC | Age: 75
End: 2025-03-17
Payer: COMMERCIAL

## 2025-03-17 DIAGNOSIS — E11.65 TYPE 2 DIABETES MELLITUS WITH HYPERGLYCEMIA, WITHOUT LONG-TERM CURRENT USE OF INSULIN (H): Primary | ICD-10-CM

## 2025-03-17 NOTE — TELEPHONE ENCOUNTER
[facility] phone call message - order or referral request for patient:     Order or referral being requested: Lab order- A1c      Additional Comments: Patient would like to get labs before appointment on 3/26 with Dr. Hancock. Patient mentioned he wanted his A1c checked.     Dr. Shepard, are you able to order the A1c beforehand?    OK to leave a message on voice mail? Yes    Primary language: English      needed? No    Call taken on March 17, 2025 at 8:17 AM by Ysabel Moreira

## 2025-03-18 NOTE — TELEPHONE ENCOUNTER
Hx: Patient self referred - used to see Dr. Willoughby     Plan: Mushtaq 4/2     Attempts to Contact:    Patient contacted and appointment made with Dr. Landin on 4/2/2025.

## 2025-03-19 NOTE — TELEPHONE ENCOUNTER
RECORDS RECEIVED FROM:    DATE RECEIVED:    NOTES STATUS DETAILS   OFFICE NOTE from referring provider  Internal    OFFICE NOTE from other cardiologists  N/A    RECORDS from hospital/ED Internal    MEDICATION LIST Internal    GENERAL CARDIO RECORDS   (ALL APPOINTMENT TYPES)     LABS (CBC,BMP,CMP, TSH) Internal    EKG (STRIPS & REPORTS) Internal 12/9/24   MONITORS (STRIPS & REPORTS) N/A    ECHOS (IMAGES AND REPORTS) Internal 10/25/24   STRESS TESTS (IMAGES AND REPORTS) N/A    cMRI (IMAGES AND REPORTS) N/A    Cardiac cath (IMAGES AND REPORTS) N/A    CT/CTA (IMAGES AND REPORTS) Internal 9/22/24

## 2025-03-26 ENCOUNTER — ANTICOAGULATION THERAPY VISIT (OUTPATIENT)
Dept: ANTICOAGULATION | Facility: CLINIC | Age: 75
End: 2025-03-26

## 2025-03-26 ENCOUNTER — OFFICE VISIT (OUTPATIENT)
Dept: FAMILY MEDICINE | Facility: CLINIC | Age: 75
End: 2025-03-26
Payer: COMMERCIAL

## 2025-03-26 VITALS
TEMPERATURE: 98.1 F | OXYGEN SATURATION: 95 % | HEART RATE: 106 BPM | RESPIRATION RATE: 20 BRPM | SYSTOLIC BLOOD PRESSURE: 143 MMHG | DIASTOLIC BLOOD PRESSURE: 87 MMHG | HEIGHT: 71 IN | BODY MASS INDEX: 44.1 KG/M2 | WEIGHT: 315 LBS

## 2025-03-26 DIAGNOSIS — N18.31 STAGE 3A CHRONIC KIDNEY DISEASE (H): ICD-10-CM

## 2025-03-26 DIAGNOSIS — I50.32 CHRONIC HEART FAILURE WITH PRESERVED EJECTION FRACTION (H): ICD-10-CM

## 2025-03-26 DIAGNOSIS — N18.32 TYPE 2 DIABETES MELLITUS WITH STAGE 3B CHRONIC KIDNEY DISEASE, WITHOUT LONG-TERM CURRENT USE OF INSULIN (H): ICD-10-CM

## 2025-03-26 DIAGNOSIS — I87.2 STASIS DERMATITIS OF BOTH LEGS: ICD-10-CM

## 2025-03-26 DIAGNOSIS — Z79.01 LONG TERM (CURRENT) USE OF ANTICOAGULANTS: ICD-10-CM

## 2025-03-26 DIAGNOSIS — Z12.11 SCREEN FOR COLON CANCER: ICD-10-CM

## 2025-03-26 DIAGNOSIS — K59.00 CONSTIPATION, UNSPECIFIED CONSTIPATION TYPE: ICD-10-CM

## 2025-03-26 DIAGNOSIS — E78.5 HYPERLIPIDEMIA LDL GOAL <100: ICD-10-CM

## 2025-03-26 DIAGNOSIS — E11.65 TYPE 2 DIABETES MELLITUS WITH HYPERGLYCEMIA, WITHOUT LONG-TERM CURRENT USE OF INSULIN (H): ICD-10-CM

## 2025-03-26 DIAGNOSIS — I48.19 PERSISTENT ATRIAL FIBRILLATION (H): ICD-10-CM

## 2025-03-26 DIAGNOSIS — M79.89 LEG SWELLING: ICD-10-CM

## 2025-03-26 DIAGNOSIS — I48.20 CHRONIC ATRIAL FIBRILLATION (H): Primary | ICD-10-CM

## 2025-03-26 DIAGNOSIS — E63.9 NUTRITIONAL DEFICIENCY: ICD-10-CM

## 2025-03-26 DIAGNOSIS — E11.22 TYPE 2 DIABETES MELLITUS WITH STAGE 3B CHRONIC KIDNEY DISEASE, WITHOUT LONG-TERM CURRENT USE OF INSULIN (H): ICD-10-CM

## 2025-03-26 DIAGNOSIS — E21.3 HYPERPARATHYROIDISM: ICD-10-CM

## 2025-03-26 DIAGNOSIS — I89.0 LYMPHEDEMA: Primary | ICD-10-CM

## 2025-03-26 DIAGNOSIS — I11.0 HYPERTENSIVE HEART DISEASE WITH HEART FAILURE (H): ICD-10-CM

## 2025-03-26 DIAGNOSIS — I48.0 PAROXYSMAL ATRIAL FIBRILLATION (H): ICD-10-CM

## 2025-03-26 LAB
ALBUMIN SERPL BCG-MCNC: 3.8 G/DL (ref 3.5–5.2)
ALP SERPL-CCNC: 105 U/L (ref 40–150)
ALT SERPL W P-5'-P-CCNC: 11 U/L (ref 0–70)
ANION GAP SERPL CALCULATED.3IONS-SCNC: 12 MMOL/L (ref 7–15)
AST SERPL W P-5'-P-CCNC: 20 U/L (ref 0–45)
BILIRUB SERPL-MCNC: 0.7 MG/DL
BUN SERPL-MCNC: 11.6 MG/DL (ref 8–23)
CALCIUM SERPL-MCNC: 9.3 MG/DL (ref 8.8–10.4)
CHLORIDE SERPL-SCNC: 99 MMOL/L (ref 98–107)
CHOLEST SERPL-MCNC: 105 MG/DL
CREAT SERPL-MCNC: 1.03 MG/DL (ref 0.67–1.17)
EGFRCR SERPLBLD CKD-EPI 2021: 76 ML/MIN/1.73M2
EST. AVERAGE GLUCOSE BLD GHB EST-MCNC: 131 MG/DL
FASTING STATUS PATIENT QL REPORTED: YES
FASTING STATUS PATIENT QL REPORTED: YES
GLUCOSE SERPL-MCNC: 121 MG/DL (ref 70–99)
HBA1C MFR BLD: 6.2 % (ref 0–5.6)
HCO3 SERPL-SCNC: 27 MMOL/L (ref 22–29)
HDLC SERPL-MCNC: 37 MG/DL
INR BLD: 3.1 (ref 0.9–1.1)
LDLC SERPL CALC-MCNC: 50 MG/DL
NONHDLC SERPL-MCNC: 68 MG/DL
NT-PROBNP SERPL-MCNC: 881 PG/ML (ref 0–900)
PHOSPHATE SERPL-MCNC: 2.7 MG/DL (ref 2.5–4.5)
POTASSIUM SERPL-SCNC: 3.9 MMOL/L (ref 3.4–5.3)
PROT SERPL-MCNC: 7.3 G/DL (ref 6.4–8.3)
PTH-INTACT SERPL-MCNC: 129 PG/ML (ref 15–65)
SODIUM SERPL-SCNC: 138 MMOL/L (ref 135–145)
TRIGL SERPL-MCNC: 88 MG/DL
VIT D+METAB SERPL-MCNC: 34 NG/ML (ref 20–50)

## 2025-03-26 PROCEDURE — 82306 VITAMIN D 25 HYDROXY: CPT

## 2025-03-26 PROCEDURE — 36415 COLL VENOUS BLD VENIPUNCTURE: CPT

## 2025-03-26 PROCEDURE — 83036 HEMOGLOBIN GLYCOSYLATED A1C: CPT

## 2025-03-26 PROCEDURE — 3077F SYST BP >= 140 MM HG: CPT

## 2025-03-26 PROCEDURE — 99214 OFFICE O/P EST MOD 30 MIN: CPT | Mod: GC

## 2025-03-26 PROCEDURE — 80061 LIPID PANEL: CPT

## 2025-03-26 PROCEDURE — 3079F DIAST BP 80-89 MM HG: CPT

## 2025-03-26 PROCEDURE — 83880 ASSAY OF NATRIURETIC PEPTIDE: CPT

## 2025-03-26 PROCEDURE — 80053 COMPREHEN METABOLIC PANEL: CPT

## 2025-03-26 PROCEDURE — 84100 ASSAY OF PHOSPHORUS: CPT

## 2025-03-26 PROCEDURE — 85610 PROTHROMBIN TIME: CPT

## 2025-03-26 PROCEDURE — 83970 ASSAY OF PARATHORMONE: CPT

## 2025-03-26 RX ORDER — MAGNESIUM OXIDE 400 MG/1
400 TABLET ORAL DAILY
Qty: 90 TABLET | Refills: 3 | Status: SHIPPED | OUTPATIENT
Start: 2025-03-26

## 2025-03-26 RX ORDER — FERROUS SULFATE 324(65)MG
1 TABLET, DELAYED RELEASE (ENTERIC COATED) ORAL EVERY OTHER DAY
COMMUNITY

## 2025-03-26 RX ORDER — MAGNESIUM OXIDE 400 MG/1
400 TABLET ORAL DAILY
COMMUNITY
End: 2025-03-26

## 2025-03-26 RX ORDER — PYRITHIONE ZINC 1 G/ML
1 SHAMPOO TOPICAL DAILY
Qty: 90 CAPSULE | Refills: 0 | Status: SHIPPED | OUTPATIENT
Start: 2025-03-26

## 2025-03-26 RX ORDER — ATORVASTATIN CALCIUM 40 MG/1
40 TABLET, FILM COATED ORAL DAILY
COMMUNITY

## 2025-03-26 RX ORDER — TORSEMIDE 20 MG/1
80 TABLET ORAL DAILY
Qty: 360 TABLET | Refills: 1 | Status: SHIPPED | OUTPATIENT
Start: 2025-03-26 | End: 2025-09-22

## 2025-03-26 RX ORDER — AMOXICILLIN 250 MG
CAPSULE ORAL
Qty: 180 TABLET | Refills: 0 | Status: SHIPPED | OUTPATIENT
Start: 2025-03-26

## 2025-03-26 RX ORDER — POTASSIUM CHLORIDE 1500 MG/1
40 TABLET, EXTENDED RELEASE ORAL 2 TIMES DAILY
Qty: 120 TABLET | Refills: 0 | Status: SHIPPED | OUTPATIENT
Start: 2025-03-26

## 2025-03-26 RX ORDER — METOPROLOL SUCCINATE 100 MG/1
100 TABLET, EXTENDED RELEASE ORAL DAILY
Qty: 30 TABLET | Refills: 0 | Status: SHIPPED | OUTPATIENT
Start: 2025-03-26

## 2025-03-26 RX ORDER — PETROLATUM,WHITE
OINTMENT IN PACKET (GRAM) TOPICAL
Qty: 10000 G | Refills: 0 | Status: SHIPPED | OUTPATIENT
Start: 2025-03-26

## 2025-03-26 RX ORDER — MULTIPLE VITAMINS W/ MINERALS TAB 9MG-400MCG
1 TAB ORAL DAILY
COMMUNITY

## 2025-03-26 SDOH — HEALTH STABILITY: PHYSICAL HEALTH: ON AVERAGE, HOW MANY MINUTES DO YOU ENGAGE IN EXERCISE AT THIS LEVEL?: 0 MIN

## 2025-03-26 SDOH — HEALTH STABILITY: PHYSICAL HEALTH: ON AVERAGE, HOW MANY DAYS PER WEEK DO YOU ENGAGE IN MODERATE TO STRENUOUS EXERCISE (LIKE A BRISK WALK)?: 0 DAYS

## 2025-03-26 ASSESSMENT — SOCIAL DETERMINANTS OF HEALTH (SDOH): HOW OFTEN DO YOU GET TOGETHER WITH FRIENDS OR RELATIVES?: PATIENT DECLINED

## 2025-03-26 NOTE — PROGRESS NOTES
Assessment & Plan     Lymphedema  ***  - Lymphedema Therapy  Referral; Future    Stasis dermatitis of both legs  ***  - Wound Ostomy Continence Nurse IP Consult    Leg swelling  ***  - N terminal pro BNP outpatient; Future  - Comprehensive metabolic panel; Future  -  Lower Extremity Venous Duplex Bilateral; Future  - white petrolatum gel; Use on legs before applying compression stockings  - Comprehensive metabolic panel  - N terminal pro BNP outpatient    Hypertensive heart disease with heart failure (H)  ***  - N terminal pro BNP outpatient; Future  - N terminal pro BNP outpatient    Chronic heart failure with preserved ejection fraction (H)  ***  - potassium chloride chen ER (KLOR-CON M20) 20 MEQ CR tablet; Take 2 tablets (40 mEq) by mouth 2 times daily.  - Comprehensive metabolic panel; Future  - Comprehensive metabolic panel    Type 2 diabetes mellitus with stage 3b chronic kidney disease, without long-term current use of insulin (H)  ***    Type 2 diabetes mellitus with hyperglycemia, without long-term current use of insulin (H)  ***  - Hemoglobin A1c; Future  - Hemoglobin A1c  - metFORMIN (GLUCOPHAGE) 500 MG tablet; Take 1 tablet (500 mg) by mouth 2 times daily (with meals).  - Comprehensive metabolic panel; Future  - Comprehensive metabolic panel    Hyperlipidemia LDL goal <100  ***  - Lipid panel; Future  - Lipid panel  - Omega-3 Fatty Acids (EQL FISH OIL) 1000 MG CAPS; Take 1 capsule by mouth daily.  - Comprehensive metabolic panel; Future  - Comprehensive metabolic panel    Stage 3a chronic kidney disease (H)  ***  - Albumin Random Urine Quantitative with Creat Ratio; Future  - Parathyroid Hormone Intact; Future  - Parathyroid Hormone Intact  - Comprehensive metabolic panel; Future  - Comprehensive metabolic panel  - Albumin Random Urine Quantitative with Creat Ratio; Future    Long term (current) use of anticoagulants  ***  - INR point of care (finger stick)  - torsemide (DEMADEX) 20 MG  "tablet; Take 4 tablets (80 mg) by mouth daily.    Persistent atrial fibrillation (H)  ***  - INR point of care (finger stick)  - torsemide (DEMADEX) 20 MG tablet; Take 4 tablets (80 mg) by mouth daily.    Screen for colon cancer  ***  - Fecal colorectal cancer screen FIT - Future (S+30); Future    Constipation, unspecified constipation type  ***  - senna-docusate (SENEXON-S) 8.6-50 MG tablet; Take 1 to 2 tablets by mouth 2 times daily as needed for constipation    Nutritional deficiency  ***  - magnesium oxide (MAG-OX) 400 MG tablet; Take 1 tablet (400 mg) by mouth daily.    {Patient advised of split billing (Optional):847423}        BMI  Estimated body mass index is 56.07 kg/m  as calculated from the following:    Height as of this encounter: 1.803 m (5' 11\").    Weight as of this encounter: 182.3 kg (402 lb).   {Weight Management Plan needed for ACO:706733}    Counseling  Appropriate preventive services were addressed with this patient via screening, questionnaire, or discussion as appropriate for fall prevention, nutrition, physical activity, Tobacco-use cessation, social engagement, weight loss and cognition.  Checklist reviewing preventive services available has been given to the patient.  Reviewed patient's diet, addressing concerns and/or questions.   The patient was instructed to see the dentist every 6 months.   Updated plan of care.  Patient reported difficulty with activities of daily living were addressed today.Information on urinary incontinence and treatment options given to patient.       {FOLLOW UP PLANS (Optional) Includes COVID19 Treatment Plan:838787}    Return in about 2 weeks (around 4/9/2025), or if symptoms worsen or fail to improve, for Follow up.    Lyndsay Ortiz is a 74 year old, presenting for the following health issues:  Pain (Both leg pain) and Recheck Medication      3/26/2025     9:28 AM   Additional Questions   Roomed by Sakinain         3/26/2025    Information " "   services provided? No     Via the Health Maintenance questionnaire, the patient has reported the following services have been completed -Eye Exam: [facility] 2024-11-24, this information has been sent to the abstraction team.  JODEE Ortiz presents today for issues about leg pain.   Was hosptilzaed for falls and sepsis in December. Sent to TCU in January.   Went home to apartment that was not liveable and rehospalizated.   [facility] to [facility] care, fell again and came back    Leg pain noticed more so today. Has been worsening over past 10 days.   Uses compression stockings. Strength is unknown. Had condition since 2017.   Has congestive heart failure with diabetes. Cardiology follow up coming up.   Legs feels swollen and very painful when bent and sit to stand.   No calf pain, it is across surface on top expspeically on front of left leg.   Feels itchy.   No worsening SOB or chest pain.   Had lightheadedness this morning. Feels like vertigo, happens when rising out of chair too quick. No longer feeling. This was Not a room spinning senstaion  Not weighing self. Unclear dry weight.   Endorses cough from long covid, unchanged    Taking all heart medicines    ECHo 10/2024  Interpretation Summary  Technically difficult study.Extremely poor acoustic windows.  Global and regional left ventricular function is normal with an EF of 55-60%.  Right ventricular function, chamber size, wall motion, and thickness are  normal.  Pulmonary artery systolic pressure is normal.  The inferior vena cava is normal.  No pericardial effusion is present.    Review of Systems  Constitutional, HEENT, cardiovascular, pulmonary, gi and gu systems are negative, except as otherwise noted.      Objective    BP (!) 143/87 (BP Location: Left arm, Patient Position: Sitting, Cuff Size: Adult Large)   Pulse 106   Temp 98.1  F (36.7  C) (Oral)   Resp 20   Ht 1.803 m (5' 11\")   Wt (!) 182.3 kg (402 " lb)   SpO2 95%   BMI 56.07 kg/m    Body mass index is 56.07 kg/m .  Physical Exam   {Exam List (Optional):663446}    {Diagnostic Test Results (Optional):321911}        Signed Electronically by: Von Hancock MD  {Email feedback regarding this note to [email or web address]   :[id number]}  Answers submitted by the patient for this visit:  Patient Health Questionnaire (Submitted on 3/26/2025)  If you checked off any problems, how difficult have these problems made it for you to do your work, take care of things at home, or get along with other people?: Not difficult at all  PHQ9 TOTAL SCORE: 0     musculoskeletal defects noted, mild lower extremity tenderness in area of edema  SKIN: woody appearing and faintly erythematous lower extremities with significant dry skin on feet, tense skin, no increased warmth, healing scab wound on anterior right leg  NEURO: Normal strength and tone, mentation intact and speech normal  PSYCH: mentation appears normal, affect normal/bright    Results for orders placed or performed in visit on 03/26/25   INR point of care (finger stick)     Status: Abnormal   Result Value Ref Range    INR 3.1 (H) 0.9 - 1.1    Narrative    This test is intended for monitoring Coumadin therapy. Results are not accurate in patients with prolonged INR due to factor deficiency.   Hemoglobin A1c     Status: Abnormal   Result Value Ref Range    Estimated Average Glucose 131 (H) <117 mg/dL    Hemoglobin A1C 6.2 (H) 0.0 - 5.6 %   Lipid panel     Status: Abnormal   Result Value Ref Range    Cholesterol 105 <200 mg/dL    Triglycerides 88 <150 mg/dL    Direct Measure HDL 37 (L) >=40 mg/dL    LDL Cholesterol Calculated 50 <100 mg/dL    Non HDL Cholesterol 68 <130 mg/dL    Patient Fasting > 8hrs? Yes     Narrative    Cholesterol  Desirable: < 200 mg/dL  Borderline High: 200 - 239 mg/dL  High: >= 240 mg/dL    Triglycerides  Normal: < 150 mg/dL  Borderline High: 150 - 199 mg/dL  High: 200-499 mg/dL  Very High: >= 500 mg/dL    Direct Measure HDL  Female: >= 50 mg/dL   Male: >= 40 mg/dL    LDL Cholesterol  Desirable: < 100 mg/dL  Above Desirable: 100 - 129 mg/dL   Borderline High: 130 - 159 mg/dL   High:  160 - 189 mg/dL   Very High: >= 190 mg/dL    Non HDL Cholesterol  Desirable: < 130 mg/dL  Above Desirable: 130 - 159 mg/dL  Borderline High: 160 - 189 mg/dL  High: 190 - 219 mg/dL  Very High: >= 220 mg/dL   Parathyroid Hormone Intact     Status: Abnormal   Result Value Ref Range    Parathyroid Hormone Intact 129 (H) 15 - 65 pg/mL    Narrative    This result was obtained with the Roche Elecsys PTH STAT assay.    This reference range differs from PTH assays used in other [facility] laboratories.   Comprehensive metabolic panel     Status: Abnormal   Result Value Ref Range    Sodium 138 135 - 145 mmol/L    Potassium 3.9 3.4 - 5.3 mmol/L    Carbon Dioxide (CO2) 27 22 - 29 mmol/L    Anion Gap 12 7 - 15 mmol/L    Urea Nitrogen 11.6 8.0 - 23.0 mg/dL    Creatinine 1.03 0.67 - 1.17 mg/dL    GFR Estimate 76 >60 mL/min/1.73m2    Calcium 9.3 8.8 - 10.4 mg/dL    Chloride 99 98 - 107 mmol/L    Glucose 121 (H) 70 - 99 mg/dL    Alkaline Phosphatase 105 40 - 150 U/L    AST 20 0 - 45 U/L    ALT 11 0 - 70 U/L    Protein Total 7.3 6.4 - 8.3 g/dL    Albumin 3.8 3.5 - 5.2 g/dL    Bilirubin Total 0.7 <=1.2 mg/dL    Patient Fasting > 8hrs? Yes    N terminal pro BNP outpatient     Status: Normal   Result Value Ref Range    N Terminal Pro BNP Outpatient 881 0 - 900 pg/mL   Vitamin D Level     Status: Normal   Result Value Ref Range    Vitamin D, Total (25-Hydroxy) 34 20 - 50 ng/mL    Narrative    Season, race, dietary intake, and treatment affect the concentration of 25-hydroxy-Vitamin D. Values may decrease during winter months and increase during summer months.    Vitamin D determination is routinely performed by an immunoassay specific for 25 hydroxyvitamin D3.  If an individual is on vitamin D2(ergocalciferol) supplementation, please specify 25 OH vitamin D2 and D3 level determination by LCMSMS test VITD23.     Phosphorus     Status: Normal   Result Value Ref Range    Phosphorus 2.7 2.5 - 4.5 mg/dL           Signed Electronically by: Von Hancock MD    Answers submitted by the patient for this visit:  Patient Health Questionnaire (Submitted on 3/26/2025)  If you checked off any problems, how difficult have these problems made it for you to do your work, take care of things at home, or get along with other people?: Not difficult at all  PHQ9 TOTAL SCORE: 0

## 2025-03-26 NOTE — PROGRESS NOTES
Preceptor Attestation:   Patient seen, evaluated and discussed with the resident. I have verified the content of the note, which accurately reflects my assessment of the patient and the plan of care.   Supervising Physician:  Gumaro Latif MD

## 2025-03-26 NOTE — PROGRESS NOTES
ANTICOAGULATION MANAGEMENT     Clarke Moreira 74 year old male is on warfarin with therapeutic INR result. (Goal INR 2.0-3.0)    Recent labs: (last 7 days)     03/26/25  0847   INR 3.1*       ASSESSMENT     Source(s): Chart Review and Patient/Caregiver Call     Warfarin doses taken: Warfarin taken as instructed  Diet: No new diet changes identified  Medication/supplement changes: None noted  New illness, injury, or hospitalization: No  Signs or symptoms of bleeding or clotting: No  Previous result: Therapeutic last 2(+) visits was in tcu discharged on 9 mg daily  Additional findings: None       PLAN     Recommended plan for no diet, medication or health factor changes affecting INR     Dosing Instructions: Continue your current warfarin dose with next INR in 1 week  will be at [place] for follow up ov    Summary  As of 3/26/2025      Full warfarin instructions:  9 mg every day   Next INR check:  4/2/2025               Telephone call with Angel who verbalizes understanding and agrees to plan    Patient offered & declined to schedule next visit    Education provided: Please call back if any changes to your diet, medications or how you've been taking warfarin    Plan made per [facility] anticoagulation protocol    Robinson aLra RN  3/26/2025  Anticoagulation Clinic  [employer] for routing messages: [facility]  [facility] patient phone line: [phone]        _______________________________________________________________________     Anticoagulation Episode Summary       Current INR goal:  2.0-3.0   TTR:  65.3% (11.5 mo)   Target end date:  Indefinite   Send INR reminders to:  [facility]    Indications    Chronic atrial fibrillation (H) [I48.20]  Long term (current) use of anticoagulants [Z79.01]  Paroxysmal atrial fibrillation (H) [I48.0]  Persistent atrial fibrillation (H) [I48.19]             Comments:  [facility] to [facility] discharges             Anticoagulation Care Providers        Provider Role Specialty Phone number    Matthias Sanchez MD Referring Family Medicine [phone]    Farideh Dasilva MD Referring Family Medicine [phone]    Henrry Shepard MD Referring Family Medicine [phone]

## 2025-03-26 NOTE — PATIENT INSTRUCTIONS
Patient Education   Here is the plan from today's visit    1. Lymphedema (Primary)  2. Stasis dermatitis of both legs  3. Leg swelling  I think your edema in your legs can benefit from lymphedema referral for stronger compression stockings and massages. Please schedule this today. I'm concerned for possible blood clot. This can lead to a blood clot in the lungs or legs that is life threatening. Please see the hand outs about DVT and pulmonary embolism for information about warning signs that needs prompt follow in the ED. I will also check your heart failure labs and your kidney and liver panel as possible reasons. Please use vaseline on skin and see dermatitis skin cares, and elevated you legs.   - Lymphedema Therapy  Referral; Future  - N terminal pro BNP outpatient; Future  - Comprehensive metabolic panel; Future  - US Lower Extremity Venous Duplex Bilateral; Future  - white petrolatum gel; Use on legs before applying compression stockings  Dispense: 59761 g; Refill: 0    4. Hypertensive heart disease with heart failure (H)  5. Chronic heart failure with preserved ejection fraction (H)  Please follow up with Cardiology appointment on 4/2/25. I think you can take a few more doses of Torsemide (1 extra tab today and for next 3 days after), and discuss with your cardiologist. We will contact about the results because we want to monitor your kidney function and potassium. You will need to recheck a kidney panel I suspect when you see your cardiology team.   - N terminal pro BNP outpatient; Future  - potassium chloride chen ER (KLOR-CON M20) 20 MEQ CR tablet; Take 2 tablets (40 mEq) by mouth 2 times daily.  Dispense: 120 tablet; Refill: 0  - Comprehensive metabolic panel; Future    6. Type 2 diabetes mellitus with stage 3b chronic kidney disease, without long-term current use of insulin (H)  7. Type 2 diabetes mellitus with hyperglycemia, without long-term current use of insulin (H)  You have refills and we  are going to recheck your A1c and kidney fucntion  - Hemoglobin A1c; Future  - Hemoglobin A1c  - metFORMIN (GLUCOPHAGE) 500 MG tablet; Take 1 tablet (500 mg) by mouth 2 times daily (with meals).  Dispense: 180 tablet; Refill: 3  - Comprehensive metabolic panel; Future    8. Hyperlipidemia LDL goal <100  Refills given for treatment. We will check your cholesterol again too  - Lipid panel; Future  - Lipid panel  - Omega-3 Fatty Acids (EQL FISH OIL) 1000 MG CAPS; Take 1 capsule by mouth daily.  Dispense: 90 capsule; Refill: 0  - Comprehensive metabolic panel; Future    9. Stage 3a chronic kidney disease (H)  Checking kidney labs  - Albumin Random Urine Quantitative with Creat Ratio; Future  - Parathyroid Hormone Intact; Future  - Albumin Random Urine Quantitative with Creat Ratio  - Parathyroid Hormone Intact  - Comprehensive metabolic panel; Future    10. Long term (current) use of anticoagulants  11. Persistent atrial fibrillation (H)  Checking your INR today. Follow up regularly with INR clinic preferably or with our clinic here.   - INR point of care (finger stick)  - torsemide (DEMADEX) 20 MG tablet; Take 4 tablets (80 mg) by mouth daily.  Dispense: 360 tablet; Refill: 1    12. Screen for colon cancer  I recommend getting this cancer screen that checks you stool   - Fecal colorectal cancer screen FIT - Future (S+30); Future    13. Constipation, unspecified constipation type  Refill  - senna-docusate (SENEXON-S) 8.6-50 MG tablet; Take 1 to 2 tablets by mouth 2 times daily as needed for constipation  Dispense: 180 tablet; Refill: 0    14. Nutritional deficiency  Refill  - magnesium oxide (MAG-OX) 400 MG tablet; Take 1 tablet (400 mg) by mouth daily.  Dispense: 90 tablet; Refill: 3        Please call or return to clinic if your symptoms don't go away.    Follow up plan  Return in about 2 weeks (around 4/9/2025), or if symptoms worsen or fail to improve, for Follow up.    Thank you for coming to [facility]  today.  Lab Testing:  **If you had lab testing today and your results are reassuring or normal they will be mailed to you or sent through [employer] within 7 days.   **If the lab tests need quick action we will call you with the results.  **If you are having labs done on a different day, please call [phone] to schedule at [facility] or [phone] for other [facility] Outpatient Lab locations. Labs do not offer walk-in appointments.  The phone number we will call with results is # [phone] (home) . If this is not the best number please call our clinic and change the number.  Medication Refills:  If you need any refills please call your pharmacy and they will contact us.   If you need to  your refill at a new pharmacy, please contact the new pharmacy directly. The new pharmacy will help you get your medications transferred faster.   Scheduling:  If you have any concerns about today's visit or wish to schedule another appointment please call our office during normal business hours [phone] (8-5:00 M-F). If you can no longer make a scheduled visit, please cancel via [employer] or call us to cancel.   If a referral was made to an [facility] specialty provider and you do not get a call from central scheduling, please refer to directions on your visit summary or call our office during normal business hours for assistance.   If a Mammogram was ordered for you at the Breast Center call [phone] to schedule or change your appointment.  If you had an XRay/CT/Ultrasound/MRI ordered the number is [phone] to schedule or change your radiology appointment.   [facility] has limited ultrasound appointments available on Wednesdays, if you would like your ultrasound at [facility], please call [phone] to schedule.   Medical Concerns:  If you have urgent medical concerns please call [phone] at any time of the day.    Von Hancock MD

## 2025-03-31 ENCOUNTER — CARE COORDINATION (OUTPATIENT)
Dept: CARDIOLOGY | Facility: CLINIC | Age: 75
End: 2025-03-31
Payer: COMMERCIAL

## 2025-03-31 DIAGNOSIS — I50.30 (HFPEF) HEART FAILURE WITH PRESERVED EJECTION FRACTION (H): Primary | ICD-10-CM

## 2025-04-01 ENCOUNTER — TELEPHONE (OUTPATIENT)
Dept: FAMILY MEDICINE | Facility: CLINIC | Age: 75
End: 2025-04-01
Payer: COMMERCIAL

## 2025-04-01 NOTE — TELEPHONE ENCOUNTER
called [facility] again to follow up on patient's SNAP benefits and Medicare Part B Savings program.     Voicemail had to be left again due to high call volume. SW provided patient's case number in voicemail and asked for a call back to both SW and patient.    YAAKOV Hernandez

## 2025-04-02 ENCOUNTER — PRE VISIT (OUTPATIENT)
Dept: CARDIOLOGY | Facility: CLINIC | Age: 75
End: 2025-04-02
Payer: COMMERCIAL

## 2025-04-07 ENCOUNTER — TELEPHONE (OUTPATIENT)
Dept: CARDIOLOGY | Facility: CLINIC | Age: 75
End: 2025-04-07
Payer: COMMERCIAL

## 2025-04-07 NOTE — TELEPHONE ENCOUNTER
Patient canceled appt last week with Dr. Landin due to inclement weather. We will call back to schedule with Dr. Landin

## 2025-04-07 NOTE — TELEPHONE ENCOUNTER
[facility] Call Center    Phone Message    May a detailed message be left on voicemail: yes     Reason for Call: Appointment Intake    Referring Provider Name: Self  Diagnosis and/or Symptoms: New HF per pt    Was schedule with Dr. Landin 4/2 cancel because of weather. Please reach out to R/S     Action Taken: Other: cardiology     Travel Screening: Not Applicable    Thank you!  Specialty Access Center       Date of Service:

## 2025-04-08 DIAGNOSIS — N18.32 TYPE 2 DIABETES MELLITUS WITH STAGE 3B CHRONIC KIDNEY DISEASE, WITHOUT LONG-TERM CURRENT USE OF INSULIN (H): ICD-10-CM

## 2025-04-08 DIAGNOSIS — I50.32 CHRONIC HEART FAILURE WITH PRESERVED EJECTION FRACTION (H): ICD-10-CM

## 2025-04-08 DIAGNOSIS — Z74.09 LIMITED MOBILITY: Primary | ICD-10-CM

## 2025-04-08 DIAGNOSIS — M79.89 LEG SWELLING: ICD-10-CM

## 2025-04-08 DIAGNOSIS — I89.0 LYMPHEDEMA: ICD-10-CM

## 2025-04-08 DIAGNOSIS — E11.22 TYPE 2 DIABETES MELLITUS WITH STAGE 3B CHRONIC KIDNEY DISEASE, WITHOUT LONG-TERM CURRENT USE OF INSULIN (H): ICD-10-CM

## 2025-04-08 DIAGNOSIS — I87.2 STASIS DERMATITIS OF BOTH LEGS: ICD-10-CM

## 2025-04-08 DIAGNOSIS — L98.9 SKIN LESION: ICD-10-CM

## 2025-04-08 NOTE — PROGRESS NOTES
Angel is a 74 year old male with a past medical history of Hfpef, Morbid Obesity, DM2, lymphedema and stasis dermatitis of the bilateral lower extremities. He was seen on 3/26/25 for management of above, found to have worsening signs of stasis dermatitis and right lower extremity wound. He is not able to attend outpatient WOC and Lymphedema referrals, per messaging from the referrals placed that visit. He has also missed many other outpatient referrals due to transportation issues, being severely limiting with walking at most short distances. Will place home care referral due to the patient's essentially home bound status.     Von Hancock MD  [facility], PGY-2  [facility]            Documentation of Face-to-Face and Certification for Home Health Services     Patient: Clarke Moreira   YOB: 1950  MR Number: [id number]  Today's Date: 4/8/2025    I certify that patient: Clarke Moreira is under my care and that I, or a nurse practitioner or physician's assistant working with me, had a face-to-face encounter that meets the physician face-to-face encounter requirements with this patient on: 3/26/25.    This encounter with the patient was in whole, or in part, for the following medical condition, which is the primary reason for home health care:   1. BMI 50.0-59.9, adult (H) (Primary)  2. Type 2 diabetes mellitus with stage 3b chronic kidney disease, without long-term current use of insulin (H)  3. Stasis dermatitis of both legs  4. Lymphedema  5. Leg swelling  6. Chronic heart failure with preserved ejection fraction (H)  7. Skin lesion  8. Limited mobility   .    I certify that, based on my findings, the following services are medically necessary home health services:  WOC Nurse, lymphedema therapy .    My clinical findings support the need for the above services because: Nurse is needed: For complex aftercare of surgical procedures because the patient  needs instruction and cannot perform care on their own due to: morbid obesity which impairs ability to physically reach wounds and manage edema..    Further, I certify that my clinical findings support that this patient is homebound (i.e. absences from home require considerable and taxing effort and are for medical reasons or Judaism services or infrequently or of short duration when for other reasons) because: Leaving home is medically contraindicated for the following reason(s): difficulties ambulating more than 100 feet on his own. and Requires assistance of another person or specialized equipment to access medical services because patient: Range of motion limitations prevents ability to exit home safely...    Based on the above findings. I certify that this patient is confined to the home and needs intermittent skilled nursing care, physical therapy and/or speech therapy.  The patient is under my care, and I have initiated the establishment of the plan of care.  This patient will be followed by a physician who will periodically review the plan of care.  Physician/Provider to provide follow up care: Henrry Shepard    Responsible Medicare certified PECOS Physician: Von Hancock  Physician Signature: See electronic signature associated with these discharge orders.  Date: 4/8/2025

## 2025-04-09 ENCOUNTER — ORDERS ONLY (AUTO-RELEASED) (OUTPATIENT)
Dept: FAMILY MEDICINE | Facility: CLINIC | Age: 75
End: 2025-04-09
Payer: COMMERCIAL

## 2025-04-09 ENCOUNTER — MYC MEDICAL ADVICE (OUTPATIENT)
Dept: ANTICOAGULATION | Facility: CLINIC | Age: 75
End: 2025-04-09
Payer: COMMERCIAL

## 2025-04-09 DIAGNOSIS — I48.0 PAROXYSMAL ATRIAL FIBRILLATION (H): ICD-10-CM

## 2025-04-09 DIAGNOSIS — I48.19 PERSISTENT ATRIAL FIBRILLATION (H): ICD-10-CM

## 2025-04-09 DIAGNOSIS — D64.9 ANEMIA, UNSPECIFIED TYPE: ICD-10-CM

## 2025-04-09 DIAGNOSIS — Z79.01 LONG TERM (CURRENT) USE OF ANTICOAGULANTS: ICD-10-CM

## 2025-04-09 DIAGNOSIS — Z12.11 SCREEN FOR COLON CANCER: ICD-10-CM

## 2025-04-09 DIAGNOSIS — I48.20 CHRONIC ATRIAL FIBRILLATION (H): ICD-10-CM

## 2025-04-09 NOTE — TELEPHONE ENCOUNTER
"Request for medication refill:    Medication Name:     ferrous sulfate (FEROSUL) 325 (65 Fe) MG tablet     warfarin ANTICOAGULANT (COUMADIN) 3 MG tablet   Providers if patient needs an appointment and you are willing to give a one month supply please refill for one month and  send a MyChart using \".SMILLIMITEDREFILL\" .Or route chart to \"P [facility] \" . To call patient and inform of limited refill and providers request to make an appointment. (Giving one month refill in non controlled medications is strongly recommended before denial)    If refill has been denied, meaning absolutely no refills without visit, please complete the smart phrase \".SMIRXREFUSE\" and route it to the \"P [facility] MED REFILLS\"  pool to inform the patient and the pharmacy.    Le Fernandez MA     "

## 2025-04-10 ENCOUNTER — TELEPHONE (OUTPATIENT)
Dept: FAMILY MEDICINE | Facility: CLINIC | Age: 75
End: 2025-04-10
Payer: COMMERCIAL

## 2025-04-10 ENCOUNTER — VIRTUAL VISIT (OUTPATIENT)
Dept: PSYCHOLOGY | Facility: CLINIC | Age: 75
End: 2025-04-10
Payer: COMMERCIAL

## 2025-04-10 DIAGNOSIS — F33.1 MODERATE EPISODE OF RECURRENT MAJOR DEPRESSIVE DISORDER (H): ICD-10-CM

## 2025-04-10 DIAGNOSIS — F34.1 PERSISTENT DEPRESSIVE DISORDER: Primary | ICD-10-CM

## 2025-04-10 DIAGNOSIS — F41.1 GAD (GENERALIZED ANXIETY DISORDER): ICD-10-CM

## 2025-04-10 RX ORDER — FERROUS SULFATE 325(65) MG
TABLET ORAL
Qty: 60 TABLET | Refills: 0 | Status: SHIPPED | OUTPATIENT
Start: 2025-04-10

## 2025-04-10 RX ORDER — WARFARIN SODIUM 3 MG/1
TABLET ORAL
Qty: 90 TABLET | Refills: 0 | Status: SHIPPED | OUTPATIENT
Start: 2025-04-10

## 2025-04-10 NOTE — Clinical Note
Clarke wanted me to let you know he is thankful for your efforts to [place] reach [place]. He has also not been able to reach anyone.

## 2025-04-10 NOTE — PROGRESS NOTES
Telemedicine Visit: The patient's condition can be safely assessed and treated via an audio only encounter.      Reason for Telemedicine Visit: Patient has requested telehealth visit and Patient unable to travel    Originating Site (Patient Location): Patient's home    Distant Location (provider location):  On-site    Consent:  The patient/guardian has verbally consented to: the potential risks and benefits of telemedicine (video visit) versus in person care; bill my insurance or make self-payment for services provided; and responsibility for payment of non-covered services.     Mode of Communication:  Telephone call via [employer]    As the provider I attest to compliance with applicable laws and regulations related to telemedicine.    [facility] Primary Care: : Integrated Behavioral Health    Behavioral Health Clinician Progress Note    Patient Name: Clarke Moreira          Service Type:  Individual      Service Location:   Phone call (patient / identified key support person reached)     Session Start Time: 8:19  Session End Time: 8:58      Session Length: 38 - 52      Attendees: Client     Service Modality:  Phone Visit    Visit Activities (Refresh list every visit): [facility] Only    Diagnostic Assessment Date: last one was 4/1/25  Treatment Plan Review Date: last one was 4/1/25  See Flowsheets for today's PHQ-9 and CHRIS-7 results  Previous PHQ-9:       3/15/2024     9:48 AM 7/11/2024     9:37 AM 3/26/2025     8:23 AM   PHQ-9 SCORE   PHQ-9 Total Score MyChart 19 (Moderately severe depression) 0 0   PHQ-9 Total Score 19 0 0        Proxy-reported     Previous CHRIS-7:       12/11/2018    10:31 AM 5/10/2019     8:26 AM 9/3/2019    10:35 AM   CHRIS-7 SCORE   Total Score 20 13 19     Treatment Objective(s) Addressed in This Session:  Goal 1: Clarke will learn/use coping skills to continue to reduce intensity/frequency of suicidal thoughts     Goal 2: Clarke will challenge/reframe negative harsh  "thoughts that he has about himself.     Current Stressors / Issues:  \"Something has gone awry in my body\" Legs all swollen up about 2 weeks after I got home. Saw PCP in the end of March and 35 lb increase in fluid weight. Need to go to radiology, go to cardiology. Had appts but didn't make them because mornings the legs were so bad couldn't bend to get into clothing. Symptoms of depression sometimes impacts Clarke's ability to seek medical care. Used motivational interviewing to make a plan for next steps in terms of addressing swelling.  Suggested talking to our triage nurse. Clarke wanted to wait on doing that as he has been trying to contact folks to get home health set up.  He is going to call them back again after our visit today. Encouraged him to call the clinic if he feels like symptoms are worsening.     Session focused on current coping with medical challenges, as well as friendships with two of the guys that have been helping him since he was home.  Contact and interaction with others is often double edged for Clarke - he longs for it and then anxiety gets so high about how they might be perceiving him or that he will do something wrong, he will push people away.  Explored how he has been acknowledging the anxiety when it comes up, but also keeping plans with these two friends and not cancelling.  This has been a different experience for him and he is allowing himself to be in an uncomfortable emotional space so that he can continue to connect with them.     Patients Response to treatment Interventions.  Clarke was actively engaged and open to feedback during the session.    Progress on Treatment Objective(s) / Homework:  Stable      Medication Review:  No current psychiatric medications prescribed    Medication Compliance:  NA    Changes in Health Issues:   Yes: Swelling in his legs continues.     Chemical Use Review:   Substance Use: No active concerns, has maintained sobriety for many " decades     Tobacco Use: No current tobacco use.      Assessment: Current Emotional / Mental Status (status of significant symptoms):  Risk status (Self / Other harm or suicidal ideation)    Patient denies current fears or concerns for personal safety.  Patient denies current or recent suicidal ideation or behaviors.  Patient denies current or recent homicidal ideation or behaviors.  Patient denies current or recent self injurious behavior or ideation.  Patient denies other safety concerns.      Appearance:   N/a telephone visit   Eye Contact:   N/a telephone visit   Psychomotor Behavior: N/a telephone visit   Attitude:   Friendly Attentive  Orientation:   All  Speech   Rate / Production: Normal/ Responsive   Volume:  Normal   Mood:    contemplative  Affect:    Appropriate   Thought Content:  Clear   Thought Form:  Coherent  Logical   Insight:    Good     Diagnoses:  Persistent Depressive Disorder  Major Depression, recurrent, moderate  Generalized Anxiety Disorder    Recommendations & Plan:     Next appt scheduled: 4/24  Working to get home health set up, encouraged him to call our triage nurses to discuss symptoms (swelling) if he feels like they are worsening  Clarke asked me to thank Hom[facility] for reaching out to [facility], Clarke has also not heard anything from the [facility]    Lety Buenrostro PsyD, JIN

## 2025-04-10 NOTE — TELEPHONE ENCOUNTER
[facility] phone call message - order or referral request for patient:     Order or referral being requested: Order (verbal)      Additional Comments: Delay in start of care  -per patient request  -until Monday 4/14/25    OK to leave a message on voice mail? Yes    Primary language: English      needed? No    Call taken on April 10, 2025 at 10:55 AM by Meera Calderon

## 2025-04-10 NOTE — TELEPHONE ENCOUNTER
Home Care is calling regarding an established patient with [facility].  Requesting orders from: Henrry Shepard: RN able to provide verbal orders.  Sending as FYI only.  Is this a request for a temporary pause in the home care episode?  No    Orders Requested    Skilled Nursing  Request for delay in care, service to be provided within 7 days of previously   Service rescheduled to Additional Comments: Delay in start of care  -per patient request  -until Monday 4/14/25    RN gave verbal order: Yes        Phone number Home Care can be reached at:   Okay to leave a detailed message?: Yes    Contacts       Contact Date/Time Type Contact Phone/Fax    04/10/2025 10:53 AM CDT Phone (Incoming) [facility] (Home Care) [phone]     [facility]; ext: [phone] - *secure line*          Teodora Webber RN

## 2025-04-14 LAB — INR (EXTERNAL): 3.9 (ref 0.9–1.1)

## 2025-04-15 ENCOUNTER — ANTICOAGULATION THERAPY VISIT (OUTPATIENT)
Dept: ANTICOAGULATION | Facility: CLINIC | Age: 75
End: 2025-04-15
Payer: COMMERCIAL

## 2025-04-15 DIAGNOSIS — I48.0 PAROXYSMAL ATRIAL FIBRILLATION (H): ICD-10-CM

## 2025-04-15 DIAGNOSIS — I48.20 CHRONIC ATRIAL FIBRILLATION (H): Primary | ICD-10-CM

## 2025-04-15 DIAGNOSIS — Z79.01 LONG TERM (CURRENT) USE OF ANTICOAGULANTS: ICD-10-CM

## 2025-04-15 DIAGNOSIS — I48.19 PERSISTENT ATRIAL FIBRILLATION (H): ICD-10-CM

## 2025-04-15 NOTE — PROGRESS NOTES
"ANTICOAGULATION MANAGEMENT     Clarke Moreira 74 year old male is on warfarin with supratherapeutic INR result. (Goal INR 2.0-3.0)    Recent labs: (last 7 days)     04/14/25  1900   INR 3.9*       ASSESSMENT     Source(s): Chart Review and Home Care/Facility Nurse     Warfarin doses taken: Warfarin taken as instructed  Diet: No new diet changes identified  Medication/supplement changes: None noted  New illness, injury, or hospitalization: No  Signs or symptoms of bleeding or clotting: No  Previous result: Supratherapeutic  Additional findings:  per home care nurse Flower, she was at patient's home last night to do a \"start of care\" however,per Flower, [facility] care will not be seeing patient stating he requires a higher level of care.  Per Flower, she was unable to weigh the patient because he is over the maxium amount the home scale can handle which is 400 lbs.  Flower states the patient has a lot of lymphedema and states per the patient at the end of January he weighed 360 lbs and he is now over 400 lbs.  Flower checked the patient's INR yesterday after [facility] had already closed but wanted to follow up today to give patient dosing instructions.       PLAN     Recommended plan for no diet, medication or health factor changes affecting INR     Dosing Instructions: decrease your warfarin dose (9.5% change) with next INR in 8 days       Summary  As of 4/15/2025      Full warfarin instructions:  6 mg every Tue, Fri; 9 mg all other days   Next INR check:  4/23/2025               Telephone call with [facility] home care nurse who verbalizes understanding and agrees to plan    Check at provider office visit    Education provided: Contact [phone] with any changes, questions or concerns.     Plan made per [facility] anticoagulation protocol    Marta Milton, JUANPABLO  4/15/2025  Anticoagulation Clinic  [employer] for routing messages: benjamin [facility]  [facility] patient phone line: " [phone]        _______________________________________________________________________     Anticoagulation Episode Summary       Current INR goal:  2.0-3.0   TTR:  65.4% (11.4 mo)   Target end date:  Indefinite   Send INR reminders to:  [facility]    Indications    Chronic atrial fibrillation (H) [I48.20]  Long term (current) use of anticoagulants [Z79.01]  Paroxysmal atrial fibrillation (H) [I48.0]  Persistent atrial fibrillation (H) [I48.19]             Comments:  Return to [facility] when Home Care discharges             Anticoagulation Care Providers       Provider Role Specialty Phone number    Matthias Sanchez MD Referring Family Medicine [phone]    Farideh Dasilva MD Referring Family Medicine [phone]    Henrry Shepard MD Referring Family Medicine [phone]

## 2025-04-21 DIAGNOSIS — Z53.9 DIAGNOSIS NOT YET DEFINED: Primary | ICD-10-CM

## 2025-04-24 ENCOUNTER — VIRTUAL VISIT (OUTPATIENT)
Dept: PSYCHOLOGY | Facility: CLINIC | Age: 75
End: 2025-04-24
Payer: COMMERCIAL

## 2025-04-24 DIAGNOSIS — F33.1 MODERATE EPISODE OF RECURRENT MAJOR DEPRESSIVE DISORDER (H): ICD-10-CM

## 2025-04-24 DIAGNOSIS — F34.1 PERSISTENT DEPRESSIVE DISORDER: Primary | ICD-10-CM

## 2025-04-24 NOTE — PROGRESS NOTES
Telemedicine Visit: The patient's condition can be safely assessed and treated via an audio only encounter.      Reason for Telemedicine Visit: Patient has requested telehealth visit and Patient unable to travel    Originating Site (Patient Location): Patient's home    Distant Location (provider location):  On-site    Consent:  The patient/guardian has verbally consented to: the potential risks and benefits of telemedicine (video visit) versus in person care; bill my insurance or make self-payment for services provided; and responsibility for payment of non-covered services.     Mode of Communication:  Telephone call via [employer]    As the provider I attest to compliance with applicable laws and regulations related to telemedicine.    [facility] Primary Care: : Integrated Behavioral Health    Behavioral Health Clinician Progress Note    Patient Name: Clarke Moreira          Service Type:  Individual      Service Location:   Phone call (patient / identified key support person reached)     Session Start Time: 8:21  Session End Time: 9:14      Session Length: 53 - 60      Attendees: Client     Service Modality:  Phone Visit    Visit Activities (Refresh list every visit): [facility] Only    Diagnostic Assessment Date: last one was 4/1/25  Treatment Plan Review Date: last one was 4/1/25  See Flowsheets for today's PHQ-9 and CHRIS-7 results  Previous PHQ-9:       3/15/2024     9:48 AM 7/11/2024     9:37 AM 3/26/2025     8:23 AM   PHQ-9 SCORE   PHQ-9 Total Score MyChart 19 (Moderately severe depression) 0 0   PHQ-9 Total Score 19 0 0        Proxy-reported     Previous CRHIS-7:       12/11/2018    10:31 AM 5/10/2019     8:26 AM 9/3/2019    10:35 AM   CHRIS-7 SCORE   Total Score 20 13 19     Treatment Objective(s) Addressed in This Session:  Goal 1: Clarke will learn/use coping skills to continue to reduce intensity/frequency of suicidal thoughts     Goal 2: Clarke will challenge/reframe negative harsh  "thoughts that he has about himself.     Current Stressors / Issues:  \"I'm not feeling spunky today\"  Clarke reports feeling very tired, down, hopeless.  Not sleeping well. Feeling vulnerable, defeated. Not using CPAP because he doesn't trust the air that is coming through because he doesn't know how clean the machine and tubing is.  Swelling in his body is making it very hard for him to get dressed, move around, do what he needs to do around the house.  A nurse came out from [facility] to do an assessment for services, but Clarke reports they declined to take him on for services due to the state of congestive heart failure.  Since he is feeling so poorly today, he had thought about cancelling this appointment, but decided not to.  Has cancelled several other appointments including the one for imaging, f/up appt here, and his podiatry appt.  Session focused on a number of the unhelpful and self-deprecating thoughts he has been having.  Discussed the role of avoidance in perpetuating the beliefs he has about how others see him.  He shared an example of an interaction he had recently where he noticed he felt more connected to the person when they allowed more of their human side (vs. Professional side) show. Explored how this applies to him as well - allowing others to see the human side of him and not the \"show\" that he feels he has to put on for others. Discussed the importance of attending appointments so that the physical issues that are making things so difficult for him right now can be better addressed.      Therapeutic Interventions:  Motivational Interviewing (MI): Validated patient's thoughts, feelings and experience. Expressed and demonstrated empathy through reflective listening.  Affirmed patient's strengths and abilities. Elicited change talk.  Cognitive Behavioral Therapy (CBT): Identified and challenged maladaptive patterns of behavior and thinking that interfere with patient's life. Reinforced " successes reported by patient since last appointment. Worked with patient to recognize irrational thought processes and identify more adaptive thoughts.      Response to treatment interventions:   Patient was engaged in the therapy process.   Patient made a plan for changes in the next week. Patient gained new insights into behaviors.      Progress on Treatment Objective(s) / Homework:  Feeling emotionally and physically poorly today, but kept this appt      Medication Review:  No current psychiatric medications prescribed    Medication Compliance:  NA    Changes in Health Issues:   Yes: Swelling in his legs continues.     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      Assessment: Current Emotional / Mental Status (status of significant symptoms):  Risk status (Self / Other harm or suicidal ideation)    Patient denies current fears or concerns for personal safety.  Patient denies current or recent suicidal ideation or behaviors.  Patient denies current or recent homicidal ideation or behaviors.  Patient denies current or recent self injurious behavior or ideation.  Patient denies other safety concerns.      Appearance:   N/a telephone visit   Eye Contact:   N/a telephone visit   Psychomotor Behavior: N/a telephone visit   Attitude:   Cooperative  Pleasant Attentive  Orientation:   All  Speech   Rate / Production: thoughtful Normal    Volume:  Normal   Mood:    Sad  Dysphoric  Affect:    Appropriate   Thought Content:  Clear   Thought Form:  Coherent  Logical   Insight:    Good     Diagnoses:  Persistent Depressive Disorder  Major Depression, recurrent, moderate  Generalized Anxiety Disorder    Recommendations & Plan:     Next appt: 4/28  Clarke will call nurses here or go to the ED if he feels like physical symptoms are worsening.  He will check with billing as he reports he hasn't received anything for our sessions since  before he was in the TCU    Lety Buenrostro PsyD, LP

## 2025-04-28 ENCOUNTER — VIRTUAL VISIT (OUTPATIENT)
Dept: PSYCHOLOGY | Facility: CLINIC | Age: 75
End: 2025-04-28
Payer: COMMERCIAL

## 2025-04-28 DIAGNOSIS — F33.1 MODERATE EPISODE OF RECURRENT MAJOR DEPRESSIVE DISORDER (H): ICD-10-CM

## 2025-04-28 DIAGNOSIS — F34.1 PERSISTENT DEPRESSIVE DISORDER: Primary | ICD-10-CM

## 2025-04-28 DIAGNOSIS — F41.1 GAD (GENERALIZED ANXIETY DISORDER): ICD-10-CM

## 2025-04-28 PROCEDURE — 90837 PSYTX W PT 60 MINUTES: CPT | Mod: 93 | Performed by: PSYCHOLOGIST

## 2025-04-28 NOTE — PROGRESS NOTES
Telemedicine Visit: The patient's condition can be safely assessed and treated via an audio only encounter.      Reason for Telemedicine Visit: Patient has requested telehealth visit and Patient unable to travel    Originating Site (Patient Location): Patient's home    Distant Location (provider location):  On-site    Consent:  The patient/guardian has verbally consented to: the potential risks and benefits of telemedicine (video visit) versus in person care; bill my insurance or make self-payment for services provided; and responsibility for payment of non-covered services.     Mode of Communication:  Telephone call via [employer]    As the provider I attest to compliance with applicable laws and regulations related to telemedicine.    [facility] Primary Care: : Integrated Behavioral Health    Behavioral Health Clinician Progress Note    Patient Name: Clarke Moreira          Service Type:  Individual      Service Location:   Phone call (patient / identified key support person reached)     Session Start Time: 9:02  Session End Time: 10:03      Session Length: 53 - 60      Attendees: Client     Service Modality:  Phone Visit    Visit Activities (Refresh list every visit): [facility] Only    Diagnostic Assessment Date: last one was 4/1/25  Treatment Plan Review Date: last one was 4/1/25  See Flowsheets for today's PHQ-9 and CHRIS-7 results  Previous PHQ-9:       3/15/2024     9:48 AM 7/11/2024     9:37 AM 3/26/2025     8:23 AM   PHQ-9 SCORE   PHQ-9 Total Score MyChart 19 (Moderately severe depression) 0 0   PHQ-9 Total Score 19 0 0        Proxy-reported     Previous CHRIS-7:       12/11/2018    10:31 AM 5/10/2019     8:26 AM 9/3/2019    10:35 AM   CHRIS-7 SCORE   Total Score 20 13 19     Treatment Objective(s) Addressed in This Session:  Goal 1: Clarke will learn/use coping skills to continue to reduce intensity/frequency of suicidal thoughts     Goal 2: Clarke will challenge/reframe negative harsh  "thoughts that he has about himself.     Current Stressors / Issues:  Clarke  reports increasing frequency of dropping items, including personal devices such as an iPad and phone. He sounds tired and expresses a sense of resignation related to aging, stating he is \"moving along in aging\" and does not want to \"suffer to get to the end.\" He referenced lyrics by Beena Harkins, noting a shared sentiment of not being afraid of aging or dying.  States he is good at the examining/reflecting on his life part, but not so good at actually living life    Patient demonstrates insight into his emotional and existential concerns, articulating ambivalence around life, but also reporting that he would not do anything to harm himself.  He continues to reflect on past relationships, particularly with his mother. He acknowledges that while her intentions may have been good, his perception as a child was different and often painful.    Utilized gestalt therapy techniques to explore unresolved emotional needs. Patient was asked to imagine himself as a young boy and speak to that version of himself, offering the affirmations he longed to hear from his mother. Patient found this intervention challenging and had difficulty engaging in the exercise.      Challenged thoughts that he has never built any long lasting connections in his life due to his lack of \"really living.\"  Explored the different people who have reached out to him and continue to reach out to him over decades which indicates that there is a connection they feel with him. Wants to feel connected to them, but also wants to protect himself from any hurt by not being too vulnerable with others.     Therapeutic Interventions:    Cognitive Behavioral Therapy (CBT): Identified and challenged maladaptive patterns of behavior and thinking that interfere with patient's life. Reinforced successes reported by patient since last appointment. Worked with patient to recognize irrational " thought processes and identify more adaptive thoughts.      Response to treatment interventions:   Patient was engaged in the therapy process. Patient gained new insights into behaviors.      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      Assessment: Current Emotional / Mental Status (status of significant symptoms):  Risk status (Self / Other harm or suicidal ideation)    Patient denies current fears or concerns for personal safety.  Patient denies current or recent suicidal ideation or behaviors.  Patient denies current or recent homicidal ideation or behaviors.  Patient denies current or recent self injurious behavior or ideation.  Patient denies other safety concerns.      Appearance:   N/a telephone visit   Eye Contact:   N/a telephone visit   Psychomotor Behavior: N/a telephone visit   Attitude:   Attentive Reflective   Orientation:   All  Speech   Rate / Production: Normal    Volume:  Normal   Mood:    Grieving  Affect:    Appropriate   Thought Content:  Clear   Thought Form:  Coherent  Logical   Insight:    Good     Diagnoses:  Persistent Depressive Disorder  Major Depression, recurrent, moderate  Generalized Anxiety Disorder    Recommendations & Plan:     Follow Up 5/8  Clarke will call nurses here or go to the ED if he feels like physical symptoms are worsening.  Check with Clarke about billing as he was going to check into this  Check with Clarke to see if he made PCP appt or rescheduled radiology appt    Lety Buenrostro PsyD, LP

## 2025-05-05 NOTE — PROGRESS NOTES
Reason for Visit:  Today I have seen Clarke Moreira in the HFpEF clinic  Consult: Yes    HPI : Status / Symptoms / Concerns     73 y/o m HTN/HFpEF/AF, obesity, DM, HLD, PAD, depression    Longstanding history of HFpEF. LVEF in the 50-55% range. Has been gaining weight over the last year. He says he is up 40 lbs. Significant scrotal edema. Doesn't check HR or BP at home. Sleeps in a hospital bed at home elevated to 30 degrees. Gets frozen meals from [employer] [place] - reportedly low salt meals.     Hospitalization in 10/2024 for mechanical fall and weakness. Went to TCU and hospitalized again in 12/2024 for the same. How at home but struggling with weight gain and swelling.     Recent Cardiovascular Hospital Admission: No    Recent Heart Failure Admission: No      Cardiovascular risk factor profile:   (+) HTN, (-) DM, (-) hypercholesterolemia, (+)  prior tobacco use, (-) fam Hx premature CAD.     Chest Pain:   No  Shortness of Breath:  Yes  Ankle Swelling:  Yes  Muscle Aches:  No  Palpitations:   No    Lightheadedness:  No  Fainting:   No  Medication Issues:  No  Recent Test:  No    Past Medical History:   Diagnosis Date    Atrial fibrillation (H)     Basal cell carcinoma     Chronic anticoagulation     Diastolic heart failure (H)     Dyslipidemia     Essential hypertension     Morbid obesity (H)     Sleep-disordered breathing     Type 2 diabetes mellitus (H)       Past Surgical History:   Procedure Laterality Date    BIOPSY OF SKIN LESION      MOHS MICROGRAPHIC PROCEDURE      PICC INSERTION Right 09/24/2017    5fr TL Bard PICC, 51cm (1cm external) in the R medial brachial vein w/ tip in the SVC.        Other Systems:  Resp - / GI - /MS - /Cirilo - /Psy - /Derm - /Hem - / - /Lymph - /ENT -/ Endo -  No other pertinent concern in systems review.     Social History: reports that he has never smoked. He has never used smokeless tobacco. He reports that he does not drink alcohol and does not use drugs. middle  management     Family History:  Orphan  Family History   Problem Relation Age of Onset    Depression Mother     Glaucoma Maternal Grandfather     Cancer No family hx of         No family history of skin cancer    Macular Degeneration No family hx of        Medications:  Current Outpatient Medications   Medication Sig Dispense Refill    aspirin (ASA) 81 MG chewable tablet Take 1 tablet (81 mg) by mouth daily. 200 tablet 2    atorvastatin (LIPITOR) 40 MG tablet Take 1 tablet (40 mg) by mouth daily 90 tablet 3    CERTAVITE/ANTIOXIDANTS tablet TAKE ONE TABLET BY MOUTH ONE TIME DAILY 100 tablet 0    FEROSUL 325 (65 Fe) MG tablet Take 1 tablet (325 mg) by mouth every other day. For iron deficiency anemia 60 tablet 0    Ferrous Sulfate 324 (65 Fe) MG TBEC Take 1 tablet by mouth every other day.      hypromellose (ARTIFICIAL TEARS) 0.5 % SOLN ophthalmic solution Place 1 drop into both eyes 4 times daily as needed for dry eyes.      levothyroxine (SYNTHROID/LEVOTHROID) 175 MCG tablet Take 1 tablet (175 mcg) by mouth every morning (before breakfast) 90 tablet 3    magnesium oxide (MAG-OX) 400 MG tablet Take 1 tablet (400 mg) by mouth daily. 90 tablet 3    metFORMIN (GLUCOPHAGE) 500 MG tablet Take 1 tablet (500 mg) by mouth 2 times daily (with meals). 180 tablet 3    metoprolol succinate ER (TOPROL XL) 100 MG 24 hr tablet Take 1 tablet (100 mg) by mouth daily. 30 tablet 0    multivitamin w/minerals (THERA-VIT-M) tablet Take 1 tablet by mouth daily.      Omega-3 Fatty Acids (EQL FISH OIL) 1000 MG CAPS Take 1 capsule by mouth daily. 90 capsule 0    potassium chloride chen ER (KLOR-CON M20) 20 MEQ CR tablet Take 2 tablets (40 mEq) by mouth 2 times daily. 120 tablet 0    senna-docusate (SENEXON-S) 8.6-50 MG tablet Take 1 to 2 tablets by mouth 2 times daily as needed for constipation 180 tablet 0    spironolactone (ALDACTONE) 50 MG tablet Take 1 tablet (50 mg) by mouth daily.      torsemide (DEMADEX) 20 MG tablet Take 4 tablets (80  mg) by mouth daily. 360 tablet 1    urea (CARMOL) 10 % external lotion Apply topically 2 times daily as needed for dry skin.      vitamin C (ASCORBIC ACID) 1000 MG TABS Take 1,000 mg by mouth daily      Vitamin D, Cholecalciferol, 25 MCG (1000 UT) CAPS Take 1 capsule by mouth daily.      warfarin ANTICOAGULANT (COUMADIN) 3 MG tablet TAKE THREE TABLETS BY MOUTH AT BEDTIME 90 tablet 0    white petrolatum gel Use on legs before applying compression stockings 72141 g 0     No current facility-administered medications for this visit.        Exam:  /85 (BP Location: Left arm, Patient Position: Chair, Cuff Size: Thigh)   Pulse 82   Wt (!) 179.4 kg (395 lb 9.6 oz)   SpO2 95%   BMI 55.18 kg/m     Body mass index is 55.18 kg/m .   General:  Alert, oriented, no acute distress, Walker   Eyes:  External exam normal, Conjunctivae noninjected and nonicteric.  Mouth:  Moist mucosa, restored teeth  Ears:  Hearing grossly intact  Neck:  No thyromegaly. Carotid upstroke normal, no carotid bruit, no JVD  Lungs:  Distant clear to auscultation. No wheezes, crackles, rales or rhonchi,      no accessory muscle use   Heart:  Irregularly irregular, normal S1 and S2, no obvious murmurs, no rubs or gallops  Abdomen: Obese, soft, non-tender  Extremities: 3+ ankle and pretibial edema, age appropriate skin without stasis  Pulses: Pedal 2+ bilaterally  Cirilo/Psy: Non-focal, normal mood, normal affect      Vital Trend:  Wt Readings from Last 5 Encounters:   05/07/25 (!) 179.4 kg (395 lb 9.6 oz)   03/26/25 (!) 182.3 kg (402 lb)   01/13/25 (!) 163 kg (359 lb 6.4 oz)   01/08/25 (!) 163.1 kg (359 lb 9.6 oz)   01/06/25 (!) 163.2 kg (359 lb 11.2 oz)     BP Readings from Last 5 Encounters:   05/07/25 123/85   03/26/25 (!) 143/87   01/13/25 124/71   01/08/25 126/78   01/06/25 103/71     Pulse Readings from Last 5 Encounters:   05/07/25 82   03/26/25 106   01/13/25 83   01/08/25 84   01/06/25 79        Data:     ECG (2024):  Atrial fibrillation    87/bpm      Echo (2024)  Technically difficult study.Extremely poor acoustic windows.  Global and regional left ventricular function is normal with an EF of 55-60%.  Right ventricular function, chamber size, wall motion, and thickness are  normal.  Pulmonary artery systolic pressure is normal.  The inferior vena cava is normal.  No pericardial effusion is present.    Echo (2019)  Interpretation Summary  Borderline (EF 50-55%) reduced left ventricular function is present.  Right ventricular systolic dysfunction appears mild.  No significant valve dysfunction.  No vegetation detected.     Echo (2018)  Technically difficult study. The patient's rhythm is atrial fibrillation.  Global and regional left ventricular function is normal with an EF of 55-60%.  Global right ventricular function is mildly reduced.  No significant valvular abnormalities were noted.  Dilation of the inferior vena cava is present with normal respiratory  variation in diameter.Estimated mean right atrial pressure is 8 mmHg.  No pericardial effusion is present.    R/L Heart Catheterization (2018):  PAWP 16  Mild PAH  Mild CAD     Lab Review:  Lab Results   Component Value Date    CR 1.03 03/26/2025    CR 0.96 01/10/2025    CR 1.06 01/02/2025    CR 1.08 12/20/2024    CR 1.01 12/10/2024    LDL 50 03/26/2025    LDL 37 10/07/2024    LDL 58 09/13/2023    HDL 37 (L) 03/26/2025    HDL 34 (L) 10/07/2024    HDL 41 09/13/2023     (H) 01/20/2007    NTBNPI 2,329 (H) 03/06/2019    NTBNPI 1,621 (H) 06/04/2018    NTBNPI 1,745 (H) 09/23/2017    NTBNPI 2890 (H) 02/27/2008    NTBNPI 1870 (H) 02/26/2008    POTASSIUM 3.9 03/26/2025    POTASSIUM 3.7 01/10/2025    POTASSIUM 3.4 01/09/2025    POTASSIUM 3.6 01/02/2025    POTASSIUM 3.6 12/20/2024     03/26/2025     01/10/2025     (L) 01/02/2025     12/20/2024     12/10/2024       Assessment:     Clarke Moreira is a 74 year old male with HTN/HFpEF/AF, obesity, DM, HLD, PAD,  depression.     NYHA III symptoms of HFpEF. Has been on beta blocker for several years now which in all likelihood is contributing to his symptoms. Symptoms are also multifactorial from edema, excess adiposity. Would benefit from changes to his medication regimen as outlined below. He is hesitant to start GLP1 agonist right now but may be open in the future.     Also discussed low sodium and heart healthy diet.     Plan:     1. HTN/HFpEF/AF  > STOP Metoprolol  > START Chlorthalidone 25mg  > continue Spironolactone 50mg  > Torsemide 80mg once daily - instructed to take prn as he loses fluid with starting chlorthalidone   > Coumadin  > avoid betablockers  > unlimited free water consumption  > 2 week ziopatch to monitor for HR in AF with stopping metoprolol    2. CAD  > Atorvastatin 40mg  > ASA 81mg    Contingency Plan: metolazone if no improvement with the above changes.   Also semaglutide, SGLT2i, diltiazem if HR elevated on ziopatch at follow up     Follow-up: 2 months with Kera MIGUEL    I spent 60min for the chart preparation, visit and documentation   This note was software transcribed.

## 2025-05-07 ENCOUNTER — ANTICOAGULATION THERAPY VISIT (OUTPATIENT)
Dept: ANTICOAGULATION | Facility: CLINIC | Age: 75
End: 2025-05-07

## 2025-05-07 ENCOUNTER — OFFICE VISIT (OUTPATIENT)
Dept: CARDIOLOGY | Facility: CLINIC | Age: 75
End: 2025-05-07
Attending: INTERNAL MEDICINE
Payer: COMMERCIAL

## 2025-05-07 ENCOUNTER — LAB (OUTPATIENT)
Dept: LAB | Facility: CLINIC | Age: 75
End: 2025-05-07
Payer: COMMERCIAL

## 2025-05-07 ENCOUNTER — ORDERS ONLY (AUTO-RELEASED) (OUTPATIENT)
Dept: CARDIOLOGY | Facility: CLINIC | Age: 75
End: 2025-05-07

## 2025-05-07 VITALS
OXYGEN SATURATION: 95 % | WEIGHT: 315 LBS | HEART RATE: 82 BPM | BODY MASS INDEX: 55.18 KG/M2 | SYSTOLIC BLOOD PRESSURE: 123 MMHG | DIASTOLIC BLOOD PRESSURE: 85 MMHG

## 2025-05-07 DIAGNOSIS — I50.32 CHRONIC HEART FAILURE WITH PRESERVED EJECTION FRACTION (H): ICD-10-CM

## 2025-05-07 DIAGNOSIS — I48.19 PERSISTENT ATRIAL FIBRILLATION (H): ICD-10-CM

## 2025-05-07 DIAGNOSIS — Z79.01 LONG TERM (CURRENT) USE OF ANTICOAGULANTS: ICD-10-CM

## 2025-05-07 DIAGNOSIS — I50.30 (HFPEF) HEART FAILURE WITH PRESERVED EJECTION FRACTION (H): ICD-10-CM

## 2025-05-07 DIAGNOSIS — I48.20 CHRONIC ATRIAL FIBRILLATION (H): Primary | ICD-10-CM

## 2025-05-07 DIAGNOSIS — I48.20 CHRONIC ATRIAL FIBRILLATION (H): ICD-10-CM

## 2025-05-07 DIAGNOSIS — I11.0 HYPERTENSIVE HEART DISEASE WITH HEART FAILURE (H): ICD-10-CM

## 2025-05-07 DIAGNOSIS — I48.0 PAROXYSMAL ATRIAL FIBRILLATION (H): ICD-10-CM

## 2025-05-07 LAB
ANION GAP SERPL CALCULATED.3IONS-SCNC: 12 MMOL/L (ref 7–15)
BUN SERPL-MCNC: 11.2 MG/DL (ref 8–23)
CALCIUM SERPL-MCNC: 9.5 MG/DL (ref 8.8–10.4)
CHLORIDE SERPL-SCNC: 97 MMOL/L (ref 98–107)
CREAT SERPL-MCNC: 1.15 MG/DL (ref 0.67–1.17)
EGFRCR SERPLBLD CKD-EPI 2021: 67 ML/MIN/1.73M2
GLUCOSE SERPL-MCNC: 125 MG/DL (ref 70–99)
HCO3 SERPL-SCNC: 29 MMOL/L (ref 22–29)
INR BLD: 2.9 (ref 0.9–1.1)
NT-PROBNP SERPL-MCNC: 1044 PG/ML (ref 0–229)
POTASSIUM SERPL-SCNC: 3.7 MMOL/L (ref 3.4–5.3)
SODIUM SERPL-SCNC: 138 MMOL/L (ref 135–145)

## 2025-05-07 PROCEDURE — G0463 HOSPITAL OUTPT CLINIC VISIT: HCPCS | Performed by: INTERNAL MEDICINE

## 2025-05-07 PROCEDURE — 83880 ASSAY OF NATRIURETIC PEPTIDE: CPT | Performed by: PATHOLOGY

## 2025-05-07 PROCEDURE — 85610 PROTHROMBIN TIME: CPT | Performed by: PATHOLOGY

## 2025-05-07 PROCEDURE — 80048 BASIC METABOLIC PNL TOTAL CA: CPT | Performed by: PATHOLOGY

## 2025-05-07 PROCEDURE — 36415 COLL VENOUS BLD VENIPUNCTURE: CPT | Performed by: PATHOLOGY

## 2025-05-07 RX ORDER — TORSEMIDE 20 MG/1
80 TABLET ORAL DAILY PRN
Qty: 360 TABLET | Refills: 4 | Status: SHIPPED | OUTPATIENT
Start: 2025-05-07

## 2025-05-07 RX ORDER — POTASSIUM CHLORIDE 1500 MG/1
40 TABLET, EXTENDED RELEASE ORAL 2 TIMES DAILY
Qty: 360 TABLET | Refills: 4 | Status: SHIPPED | OUTPATIENT
Start: 2025-05-07

## 2025-05-07 RX ORDER — CHLORTHALIDONE 25 MG/1
25 TABLET ORAL DAILY
Qty: 90 TABLET | Refills: 11 | Status: SHIPPED | OUTPATIENT
Start: 2025-05-07

## 2025-05-07 ASSESSMENT — PAIN SCALES - GENERAL: PAINLEVEL_OUTOF10: NO PAIN (0)

## 2025-05-07 NOTE — PATIENT INSTRUCTIONS
Dr. Landin recommends:    Stop taking Metoprolol.    Start taking Chlorthalidone 25 MG once daily.    Change Torsemide 80 MG daily to as needed for increased weight, swelling, or shortness of breath.    2 week Zio Patch heart monitor. (Mailed out to patient)    Follow up clinic visit with Dr. Landin in 2 months with labs prior. (BMP, NT pro BNP) OK to double book per Dr. Landin.    Thank you for your visit today.  Please call me with any questions or concerns.   Manas Argueta RN  Cardiology Care Coordinator  [phone]

## 2025-05-07 NOTE — LETTER
5/7/2025      RE: Clarke Moreira  [address]  [facility] [address]       Dear Colleague,    Thank you for the opportunity to participate in the care of your patient, Clarke Moreira, at the [facility] [place] [facility]. Please see a copy of my visit note below.    Reason for Visit:  Today I have seen Clarke Moreira in the HFpEF clinic  Consult: Yes    HPI : Status / Symptoms / Concerns     75 y/o m HTN/HFpEF/AF, obesity, DM, HLD, PAD, depression    Longstanding history of HFpEF. LVEF in the 50-55% range. Has been gaining weight over the last year. He says he is up 40 lbs. Significant scrotal edema. Doesn't check HR or BP at home. Sleeps in a hospital bed at home elevated to 30 degrees. Gets frozen meals from [employer] [place] - reportedly low salt meals.     Hospitalization in 10/2024 for mechanical fall and weakness. Went to TCU and hospitalized again in 12/2024 for the same. How at home but struggling with weight gain and swelling.     Recent Cardiovascular Hospital Admission: No    Recent Heart Failure Admission: No      Cardiovascular risk factor profile:   (+) HTN, (-) DM, (-) hypercholesterolemia, (+)  prior tobacco use, (-) fam Hx premature CAD.     Chest Pain:   No  Shortness of Breath:  Yes  Ankle Swelling:  Yes  Muscle Aches:  No  Palpitations:   No    Lightheadedness:  No  Fainting:   No  Medication Issues:  No  Recent Test:  No    Past Medical History:   Diagnosis Date     Atrial fibrillation (H)      Basal cell carcinoma      Chronic anticoagulation      Diastolic heart failure (H)      Dyslipidemia      Essential hypertension      Morbid obesity (H)      Sleep-disordered breathing      Type 2 diabetes mellitus (H)       Past Surgical History:   Procedure Laterality Date     BIOPSY OF SKIN LESION       MOHS MICROGRAPHIC PROCEDURE       PICC INSERTION Right 09/24/2017    5fr TL Bard PICC, 51cm (1cm external)  in the R medial brachial vein w/ tip in the SVC.        Other Systems:  Resp - / GI - /MS - /Cirilo - /Psy - /Derm - /Hem - / - /Lymph - /ENT -/ Endo -  No other pertinent concern in systems review.     Social History: reports that he has never smoked. He has never used smokeless tobacco. He reports that he does not drink alcohol and does not use drugs. middle management     Family History:  Orphan  Family History   Problem Relation Age of Onset     Depression Mother      Glaucoma Maternal Grandfather      Cancer No family hx of         No family history of skin cancer     Macular Degeneration No family hx of        Medications:  Current Outpatient Medications   Medication Sig Dispense Refill     aspirin (ASA) 81 MG chewable tablet Take 1 tablet (81 mg) by mouth daily. 200 tablet 2     atorvastatin (LIPITOR) 40 MG tablet Take 1 tablet (40 mg) by mouth daily 90 tablet 3     CERTAVITE/ANTIOXIDANTS tablet TAKE ONE TABLET BY MOUTH ONE TIME DAILY 100 tablet 0     FEROSUL 325 (65 Fe) MG tablet Take 1 tablet (325 mg) by mouth every other day. For iron deficiency anemia 60 tablet 0     Ferrous Sulfate 324 (65 Fe) MG TBEC Take 1 tablet by mouth every other day.       hypromellose (ARTIFICIAL TEARS) 0.5 % SOLN ophthalmic solution Place 1 drop into both eyes 4 times daily as needed for dry eyes.       levothyroxine (SYNTHROID/LEVOTHROID) 175 MCG tablet Take 1 tablet (175 mcg) by mouth every morning (before breakfast) 90 tablet 3     magnesium oxide (MAG-OX) 400 MG tablet Take 1 tablet (400 mg) by mouth daily. 90 tablet 3     metFORMIN (GLUCOPHAGE) 500 MG tablet Take 1 tablet (500 mg) by mouth 2 times daily (with meals). 180 tablet 3     metoprolol succinate ER (TOPROL XL) 100 MG 24 hr tablet Take 1 tablet (100 mg) by mouth daily. 30 tablet 0     multivitamin w/minerals (THERA-VIT-M) tablet Take 1 tablet by mouth daily.       Omega-3 Fatty Acids (EQL FISH OIL) 1000 MG CAPS Take 1 capsule by mouth daily. 90 capsule 0      potassium chloride chen ER (KLOR-CON M20) 20 MEQ CR tablet Take 2 tablets (40 mEq) by mouth 2 times daily. 120 tablet 0     senna-docusate (SENEXON-S) 8.6-50 MG tablet Take 1 to 2 tablets by mouth 2 times daily as needed for constipation 180 tablet 0     spironolactone (ALDACTONE) 50 MG tablet Take 1 tablet (50 mg) by mouth daily.       torsemide (DEMADEX) 20 MG tablet Take 4 tablets (80 mg) by mouth daily. 360 tablet 1     urea (CARMOL) 10 % external lotion Apply topically 2 times daily as needed for dry skin.       vitamin C (ASCORBIC ACID) 1000 MG TABS Take 1,000 mg by mouth daily       Vitamin D, Cholecalciferol, 25 MCG (1000 UT) CAPS Take 1 capsule by mouth daily.       warfarin ANTICOAGULANT (COUMADIN) 3 MG tablet TAKE THREE TABLETS BY MOUTH AT BEDTIME 90 tablet 0     white petrolatum gel Use on legs before applying compression stockings 36986 g 0     No current facility-administered medications for this visit.        Exam:  /85 (BP Location: Left arm, Patient Position: Chair, Cuff Size: Thigh)   Pulse 82   Wt (!) 179.4 kg (395 lb 9.6 oz)   SpO2 95%   BMI 55.18 kg/m     Body mass index is 55.18 kg/m .   General:  Alert, oriented, no acute distress, Walker   Eyes:  External exam normal, Conjunctivae noninjected and nonicteric.  Mouth:  Moist mucosa, restored teeth  Ears:  Hearing grossly intact  Neck:  No thyromegaly. Carotid upstroke normal, no carotid bruit, no JVD  Lungs:  Distant clear to auscultation. No wheezes, crackles, rales or rhonchi,      no accessory muscle use   Heart:  Irregularly irregular, normal S1 and S2, no obvious murmurs, no rubs or gallops  Abdomen: Obese, soft, non-tender  Extremities: 3+ ankle and pretibial edema, age appropriate skin without stasis  Pulses: Pedal 2+ bilaterally  Cirilo/Psy: Non-focal, normal mood, normal affect      Vital Trend:  Wt Readings from Last 5 Encounters:   05/07/25 (!) 179.4 kg (395 lb 9.6 oz)   03/26/25 (!) 182.3 kg (402 lb)   01/13/25 (!) 163 kg  (359 lb 6.4 oz)   01/08/25 (!) 163.1 kg (359 lb 9.6 oz)   01/06/25 (!) 163.2 kg (359 lb 11.2 oz)     BP Readings from Last 5 Encounters:   05/07/25 123/85   03/26/25 (!) 143/87   01/13/25 124/71   01/08/25 126/78   01/06/25 103/71     Pulse Readings from Last 5 Encounters:   05/07/25 82   03/26/25 106   01/13/25 83   01/08/25 84   01/06/25 79        Data:     ECG (2024):  Atrial fibrillation   87/bpm      Echo (2024)  Technically difficult study.Extremely poor acoustic windows.  Global and regional left ventricular function is normal with an EF of 55-60%.  Right ventricular function, chamber size, wall motion, and thickness are  normal.  Pulmonary artery systolic pressure is normal.  The inferior vena cava is normal.  No pericardial effusion is present.    Echo (2019)  Interpretation Summary  Borderline (EF 50-55%) reduced left ventricular function is present.  Right ventricular systolic dysfunction appears mild.  No significant valve dysfunction.  No vegetation detected.     Echo (2018)  Technically difficult study. The patient's rhythm is atrial fibrillation.  Global and regional left ventricular function is normal with an EF of 55-60%.  Global right ventricular function is mildly reduced.  No significant valvular abnormalities were noted.  Dilation of the inferior vena cava is present with normal respiratory  variation in diameter.Estimated mean right atrial pressure is 8 mmHg.  No pericardial effusion is present.    R/L Heart Catheterization (2018):  PAWP 16  Mild PAH  Mild CAD     Lab Review:  Lab Results   Component Value Date    CR 1.03 03/26/2025    CR 0.96 01/10/2025    CR 1.06 01/02/2025    CR 1.08 12/20/2024    CR 1.01 12/10/2024    LDL 50 03/26/2025    LDL 37 10/07/2024    LDL 58 09/13/2023    HDL 37 (L) 03/26/2025    HDL 34 (L) 10/07/2024    HDL 41 09/13/2023     (H) 01/20/2007    NTBNPI 2,329 (H) 03/06/2019    NTBNPI 1,621 (H) 06/04/2018    NTBNPI 1,745 (H) 09/23/2017    NTBNPI 2890 (H)  02/27/2008    NTBNPI 1870 (H) 02/26/2008    POTASSIUM 3.9 03/26/2025    POTASSIUM 3.7 01/10/2025    POTASSIUM 3.4 01/09/2025    POTASSIUM 3.6 01/02/2025    POTASSIUM 3.6 12/20/2024     03/26/2025     01/10/2025     (L) 01/02/2025     12/20/2024     12/10/2024       Assessment:     Clarke Moreira is a 74 year old male with HTN/HFpEF/AF, obesity, DM, HLD, PAD, depression.     NYHA III symptoms of HFpEF. Has been on beta blocker for several years now which in all likelihood is contributing to his symptoms. Symptoms are also multifactorial from edema, excess adiposity. Would benefit from changes to his medication regimen as outlined below. He is hesitant to start GLP1 agonist right now but may be open in the future.     Also discussed low sodium and heart healthy diet.     Plan:     1. HTN/HFpEF/AF  > STOP Metoprolol  > START Chlorthalidone 25mg  > continue Spironolactone 50mg  > Torsemide 80mg once daily - instructed to take prn as he loses fluid with starting chlorthalidone   > Coumadin  > avoid betablockers  > unlimited free water consumption  > 2 week ziopatch to monitor for HR in AF with stopping metoprolol    2. CAD  > Atorvastatin 40mg  > ASA 81mg    Contingency Plan: metolazone if no improvement with the above changes.   Also semaglutide, SGLT2i, diltiazem if HR elevated on ziopatch at follow up     Follow-up: 2 months with MYRIAM ntproBNP    I spent 60min for the chart preparation, visit and documentation   This note was software transcribed.        Please do not hesitate to contact me if you have any questions/concerns.     Sincerely,     Eric Landin MD

## 2025-05-07 NOTE — PROGRESS NOTES
ANTICOAGULATION MANAGEMENT     Clarke Moreira 74 year old male is on warfarin with therapeutic INR result. (Goal INR 2.0-3.0)    Recent labs: (last 7 days)     05/07/25  0846   INR 2.9*       ASSESSMENT     Source(s): Chart Review and Patient/Caregiver Call     Warfarin doses taken: More warfarin taken than planned which may be affecting INR   Reported he has been taking 9mg warfarin daily.  Diet: Gets frozen meals from [employer] [place] - reportedly low salt meals.   Medication/supplement changes: Yes   Stopped Metoprolol and switched to Chlorthalidone 25mg daily.  (Known to interact with warfarin may reduce warfarin effects.  Monitor INR closely)   Will continue with Torsemide 20mg take 4 tabs daily, instructed to take PRN for WT gain, swelling, SOB).   Weekly warfarin dose was decreased by 9.5% on 4/15/25.  New illness, injury, or hospitalization: Yes:    New pt for long standing HF with [facility]   Signs or symptoms of bleeding or clotting:   Previous result: Supratherapeutic last 2 INR results; (3.9, 3.1)  Additional findings: Yes.  - Home Care visit on 4/14/25 - per Betzaida Crenshaw [facility] will not be seeing patient stating he requires a higher level of care.    - pt WT is >400 lbs. (Home scale max WT is 400 lbs)   - last WT is January was 360 lbs.   - increased lymphedema   - pt has noted drastic increased shortness of breath   - pt declined to go to ED       PLAN     Recommended plan for ongoing change(s) affecting INR     Dosing Instructions: Continue your current warfarin dose with next INR in 2 weeks       Summary  As of 5/7/2025      Full warfarin instructions:  6 mg every Tue, Fri; 9 mg all other days   Next INR check:  5/21/2025               Telephone call with Angel who verbalizes understanding and agrees to plan    Patient offered & declined to schedule next visit    Education provided: Taking warfarin: Importance of taking warfarin as instructed  Goal range and lab monitoring: goal range and  significance of current result  Dietary considerations: importance of consistent vitamin K intake  Interaction IS anticipated between warfarin and Chlorthalidone    Plan made per [facility] anticoagulation protocol    Mirna Mcknight RN  5/7/2025  Anticoagulation Clinic  Epic Fleming for routing messages: [facility]  [facility] patient phone line: [phone]        _______________________________________________________________________     Anticoagulation Episode Summary       Current INR goal:  2.0-3.0   TTR:  66.0% (11.5 mo)   Target end date:  Indefinite   Send INR reminders to:  [facility]    Indications    Chronic atrial fibrillation (H) [I48.20]  Long term (current) use of anticoagulants [Z79.01]  Paroxysmal atrial fibrillation (H) [I48.0]  Persistent atrial fibrillation (H) [I48.19]             Comments:  Return to [facility] when [facility] discharges             Anticoagulation Care Providers       Provider Role Specialty Phone number    Matthias Sanchez MD Referring Family Medicine [phone]    Farideh Dasilva MD Referring Family Medicine [phone]    Henrry Shepard MD Referring Family Medicine [phone]

## 2025-05-07 NOTE — NURSING NOTE
Chief Complaint   Patient presents with    New Patient     Dr. Landin: Patient is self referred for cardiac evaluation related to history of HF. Previously followed with Dr. Willoughby.         Vitals were taken, medications reconciled     Prince Robertson, Clinic Assistant     9:03 AM

## 2025-05-08 ENCOUNTER — TELEPHONE (OUTPATIENT)
Dept: FAMILY MEDICINE | Facility: CLINIC | Age: 75
End: 2025-05-08

## 2025-05-08 ENCOUNTER — OFFICE VISIT (OUTPATIENT)
Dept: FAMILY MEDICINE | Facility: CLINIC | Age: 75
End: 2025-05-08
Payer: COMMERCIAL

## 2025-05-08 VITALS
DIASTOLIC BLOOD PRESSURE: 87 MMHG | HEIGHT: 71 IN | HEART RATE: 88 BPM | RESPIRATION RATE: 16 BRPM | SYSTOLIC BLOOD PRESSURE: 128 MMHG | OXYGEN SATURATION: 97 % | BODY MASS INDEX: 44.1 KG/M2 | WEIGHT: 315 LBS | TEMPERATURE: 97.7 F

## 2025-05-08 DIAGNOSIS — I48.20 CHRONIC ATRIAL FIBRILLATION (H): ICD-10-CM

## 2025-05-08 DIAGNOSIS — I48.0 PAROXYSMAL ATRIAL FIBRILLATION (H): ICD-10-CM

## 2025-05-08 DIAGNOSIS — N18.32 TYPE 2 DIABETES MELLITUS WITH STAGE 3B CHRONIC KIDNEY DISEASE, WITHOUT LONG-TERM CURRENT USE OF INSULIN (H): Primary | ICD-10-CM

## 2025-05-08 DIAGNOSIS — E03.9 HYPOTHYROIDISM, UNSPECIFIED TYPE: ICD-10-CM

## 2025-05-08 DIAGNOSIS — R53.81 PHYSICAL DECONDITIONING: ICD-10-CM

## 2025-05-08 DIAGNOSIS — I89.0 LYMPHEDEMA: ICD-10-CM

## 2025-05-08 DIAGNOSIS — E11.22 TYPE 2 DIABETES MELLITUS WITH STAGE 3B CHRONIC KIDNEY DISEASE, WITHOUT LONG-TERM CURRENT USE OF INSULIN (H): Primary | ICD-10-CM

## 2025-05-08 DIAGNOSIS — I48.19 PERSISTENT ATRIAL FIBRILLATION (H): ICD-10-CM

## 2025-05-08 DIAGNOSIS — Z79.01 LONG TERM (CURRENT) USE OF ANTICOAGULANTS: ICD-10-CM

## 2025-05-08 RX ORDER — LEVOTHYROXINE SODIUM 175 UG/1
175 TABLET ORAL
Qty: 90 TABLET | Refills: 0 | Status: SHIPPED | OUTPATIENT
Start: 2025-05-08

## 2025-05-08 RX ORDER — WARFARIN SODIUM 3 MG/1
9 TABLET ORAL AT BEDTIME
Qty: 90 TABLET | Refills: 0 | Status: SHIPPED | OUTPATIENT
Start: 2025-05-08

## 2025-05-08 NOTE — TELEPHONE ENCOUNTER
"Request for medication refill:    Medication Name:     warfarin ANTICOAGULANT (COUMADIN) 3 MG tablet       levothyroxine (SYNTHROID/LEVOTHROID) 175 MCG tablet       Providers if patient needs an appointment and you are willing to give a one month supply please refill for one month and  send a MyChart using \".SMILLIMITEDREFILL\" .Or route chart to \"[facility] \" . To call patient and inform of limited refill and providers request to make an appointment. (Giving one month refill in non controlled medications is strongly recommended before denial)    If refill has been denied, meaning absolutely no refills without visit, please complete the smart phrase \".SMIRXREFUSE\" and route it to the \"[facility] MED REFILLS\"  pool to inform the patient and the pharmacy.    Akash Alcantara MA     "

## 2025-05-08 NOTE — TELEPHONE ENCOUNTER
Prior Authorization Retail Medication Request    Medication/Dose: semaglutide (OZEMPIC) 2 MG/3ML pen   Diagnosis and ICD code (if different than what is on RX):  N/A  New/renewal/insurance change PA/secondary ins. PA:  Previously Tried and Failed:  N/A  Rationale:  N/A    Insurance   Primary: [facility] MEDIC[facility]   Insurance ID:  [id number]     Secondary (if applicable):N/A  Insurance ID:  N/A    Pharmacy Information (if different than what is on RX)  Name:    [facility] - [place] [address] [address]     Phone:  [phone]   Fax:[phone]     Clinic Information  Preferred routing pool for dept communication: N/A

## 2025-05-08 NOTE — PATIENT INSTRUCTIONS
Patient Education   Thank you for visiting [facility] today! Here's what we talked about:   Angel was seen today for follow up.    Diagnoses and all orders for this visit:    Type 2 diabetes mellitus with stage 3b chronic kidney disease, without long-term current use of insulin (H)  -     semaglutide (OZEMPIC) 2 MG/3ML pen; Inject 0.25 mg subcutaneously every 7 days.  -     semaglutide (OZEMPIC) 2 MG/3ML pen; Inject 0.5 mg subcutaneously every 7 days.    Lymphedema  -     Lymphedema Therapy  Referral; Future    Physical deconditioning  -     Physical Therapy  Referral; Future                Important Information:    If your symptoms don't go away, please call us or come back to the clinic.  For urgent medical questions, call [phone] anytime.    Referrals and Tests:    We have lab tests, ultrasounds, x-rays, and mammograms at our clinic. To make an appointment, call [phone] or talk to our .  For other referrals and tests, call [phone].    Test Results:    If your results need quick action, we will call you at [phone] (home) . If this number is wrong, please call us to update it.  Most results come back within a few days. If you don't hear about your test results within 2 weeks, call us at [phone]    Henrry Shepard MD

## 2025-05-08 NOTE — PROGRESS NOTES
Assessment & Plan     Type 2 diabetes mellitus with stage 3b chronic kidney disease, without long-term current use of insulin (H)  Discussed the pros and cons of adding a GLP-1 agonist for better management, patient in agreement with plan.     - semaglutide (OZEMPIC) 2 MG/3ML pen; Inject 0.25 mg subcutaneously every 7 days.  - semaglutide (OZEMPIC) 2 MG/3ML pen; Inject 0.5 mg subcutaneously every 7 days.    Lymphedema  Patient is interested in getting treatment for lymphedema so he can be more mobile this referral was done.  - Lymphedema Therapy  Referral; Future    Physical deconditioning  Patient is noticing a significantly deconditioned since coming back from the TCU he would like to work with physical therapy and so was referred for PT  - Physical Therapy  Referral; Cheryl Ortiz was seen today for follow up.    Diagnoses and all orders for this visit:    Type 2 diabetes mellitus with stage 3b chronic kidney disease, without long-term current use of insulin (H)  -     semaglutide (OZEMPIC) 2 MG/3ML pen; Inject 0.25 mg subcutaneously every 7 days.  -     semaglutide (OZEMPIC) 2 MG/3ML pen; Inject 0.5 mg subcutaneously every 7 days.    Lymphedema  -     Lymphedema Therapy  Referral; Future    Physical deconditioning  -     Physical Therapy  Referral; Future       The longitudinal plan of care for the diagnosis(es)/condition(s) as documented were addressed during this visit. Due to the added complexity in care, I will continue to support Angel in the subsequent management and with ongoing continuity of care.  I spent a total of 32 minutes on the day of the visit.   Time spent by me today doing chart review, history and exam, documentation and further activities per the note    Return in about 4 weeks (around 6/5/2025).          Follow-up  Return in about 4 weeks (around 6/5/2025).    Lyndsay Ortiz is a 74 year old, presenting for the following health  "issues:  Follow Up      5/8/2025    10:38 AM   Additional Questions   Roomed by Jad         5/8/2025    Information    services provided? No     HPI  The patient is a 74 year old male with a past medical history significant for Hfw/pEF of 55-60% from echo on 10/25/24 who presents to the clinic today for follow up. The patient reports he has been in and out of the hospital and the TCU this past winter and returned home in January of this year. He states since then he has lived a more sedentary lifestyle compared to when he was in the TCU and since has gained more fluid and weight. He reports difficulty ambulating secondary to the fluid and that this has caused him to miss healthcare appointments and leaving his home in general. He expresses a desire to become more active. The patient states that he saw his cardiologist yesterday and was prescribed a couple of new medications and wished to discuss these with me before starting these medications. He states he discussed getting started on a GLP-1 agonists but wanted to further explore this with me today. Clarke also wanted to discuss his lymphedema care and would like a referral to a lymphedema clinic for better management of this.    Kyra Mcleod, MS3  I was present with the medical student who participated in the service and in the documentation of this note. I have verified the history and personally performed the physical exam and medical decision making, and have verified the content of the note, which accurately reflects my assessment of the patient and the plan of care.   Henrry Shepard MD      He has not started these medications and wanted to discuss further                  Objective    /87   Pulse 88   Temp 97.7  F (36.5  C) (Temporal)   Resp 16   Ht 1.803 m (5' 11\")   Wt (!) 179.6 kg (395 lb 14.4 oz)   SpO2 97%   BMI 55.22 kg/m    Body mass index is 55.22 kg/m .  Physical Exam  Vitals reviewed.   Constitutional:       " General: He is not in acute distress.     Appearance: He is well-developed. He is obese. He is not diaphoretic.      Comments: Patient uses a walker has significant lymphedema that extends into his torso.   HENT:      Head: Normocephalic.   Eyes:      General: No scleral icterus.     Conjunctiva/sclera: Conjunctivae normal.   Neck:      Thyroid: No thyromegaly.   Cardiovascular:      Rate and Rhythm: Normal rate and regular rhythm.      Heart sounds: Normal heart sounds. No murmur heard.  Pulmonary:      Effort: Pulmonary effort is normal. No respiratory distress.      Breath sounds: Normal breath sounds. No wheezing.   Musculoskeletal:      Cervical back: Normal range of motion.      Left lower leg: No edema.   Skin:     General: Skin is warm and dry.   Neurological:      Mental Status: He is alert and oriented to person, place, and time. Mental status is at baseline.   Psychiatric:         Behavior: Behavior normal.         Thought Content: Thought content normal.         Judgment: Judgment normal.                    Signed Electronically by: Henrry Shepard MD

## 2025-05-12 NOTE — TELEPHONE ENCOUNTER
Called patient to clarify, he is not sure if he wants to start the Ozempic until after his visit with Dr. Shepard in June.    I let patient know that the PA is approved if he changes his mind    Teodora Webber RN

## 2025-05-12 NOTE — TELEPHONE ENCOUNTER
[facility] phone call message- general phone call:    Reason for call: Patient called in to state that his Ozempic has not been able to get filled by his pharmacy. States he would like to hold off on taking the medication until his next appointment with Dr. Shepard on 6/16.     Return call needed: No    OK to leave a message on voice mail? Yes    Primary language: English      needed? No    Call taken on May 12, 2025 at 8:27 AM by Ysabel Moreira

## 2025-05-13 ENCOUNTER — TELEPHONE (OUTPATIENT)
Dept: ANTICOAGULATION | Facility: CLINIC | Age: 75
End: 2025-05-13
Payer: COMMERCIAL

## 2025-05-21 ENCOUNTER — ANTICOAGULATION THERAPY VISIT (OUTPATIENT)
Dept: ANTICOAGULATION | Facility: CLINIC | Age: 75
End: 2025-05-21

## 2025-05-21 ENCOUNTER — LAB (OUTPATIENT)
Dept: LAB | Facility: CLINIC | Age: 75
End: 2025-05-21
Payer: COMMERCIAL

## 2025-05-21 ENCOUNTER — OFFICE VISIT (OUTPATIENT)
Dept: ORTHOPEDICS | Facility: CLINIC | Age: 75
End: 2025-05-21
Payer: COMMERCIAL

## 2025-05-21 DIAGNOSIS — I48.20 CHRONIC ATRIAL FIBRILLATION (H): Primary | ICD-10-CM

## 2025-05-21 DIAGNOSIS — I48.0 PAROXYSMAL ATRIAL FIBRILLATION (H): ICD-10-CM

## 2025-05-21 DIAGNOSIS — Z79.01 LONG TERM (CURRENT) USE OF ANTICOAGULANTS: ICD-10-CM

## 2025-05-21 DIAGNOSIS — E11.49 TYPE II OR UNSPECIFIED TYPE DIABETES MELLITUS WITH NEUROLOGICAL MANIFESTATIONS, NOT STATED AS UNCONTROLLED(250.60) (H): Primary | ICD-10-CM

## 2025-05-21 DIAGNOSIS — I48.19 PERSISTENT ATRIAL FIBRILLATION (H): ICD-10-CM

## 2025-05-21 DIAGNOSIS — L84 TYLOMA: ICD-10-CM

## 2025-05-21 DIAGNOSIS — B35.1 OM (ONYCHOMYCOSIS): ICD-10-CM

## 2025-05-21 LAB — INR BLD: 2.5 (ref 0.9–1.1)

## 2025-05-21 PROCEDURE — 36415 COLL VENOUS BLD VENIPUNCTURE: CPT | Performed by: PATHOLOGY

## 2025-05-21 PROCEDURE — 85610 PROTHROMBIN TIME: CPT | Performed by: PATHOLOGY

## 2025-05-21 NOTE — PROGRESS NOTES
Chief Complaint   Patient presents with    Follow Up     Foot care.               Allergies   Allergen Reactions    Seasonal Allergies          Subjective: Clarke is a 74 year old male who presents to the clinic today for a diabetic foot exam and management.  He has no new open lesions. He sees Dr. Shepard for his diabetic care and was seen 5/8/25. Since his last visit, last year, he has had a few hospitalizations and a few TCU stays. He is getting stronger at home now.     Objective  Hemoglobin A1C   Date Value Ref Range Status   03/26/2025 6.2 (H) 0.0 - 5.6 % Final     Comment:     Normal <5.7%   Prediabetes 5.7-6.4%    Diabetes 6.5% or higher     Note: Adopted from ADA consensus guidelines.   10/07/2024 6.2 (H) <5.7 % Final     Comment:     Normal <5.7%   Prediabetes 5.7-6.4%    Diabetes 6.5% or higher     Note: Adopted from ADA consensus guidelines.   09/26/2024 6.5 (H) <5.7 % Final     Comment:     Normal <5.7%   Prediabetes 5.7-6.4%    Diabetes 6.5% or higher     Note: Adopted from ADA consensus guidelines.   05/06/2021 6.2 (H) 0 - 5.6 % Final     Comment:     Normal <5.7% Prediabetes 5.7-6.4%  Diabetes 6.5% or higher - adopted from ADA   consensus guidelines.     11/09/2020 5.9 (H) 4.1 - 5.7 % Final   07/09/2020 5.9 (H) 0 - 5.6 % Final     Comment:     Normal <5.7% Prediabetes 5.7-6.4%  Diabetes 6.5% or higher - adopted from ADA   consensus guidelines.         DP and PT pulses 1/4 BL. CRT 1 second. Absent pedal hair.  Gross and protective sensations are diminished BL.  Hammertoes to all lesser digits. Hallux malleus BL. Equinus BL. Pes planus.   Onychomycosis to toes x10.  With debridement of the right medial hallux nail, there was a sanguinous bulla subungually.  No signs or symptoms of infection.  Tyloma noted to the left 5th digit DIPJ, right dorsal hallux IPJ, and right 5th DIPJ  No open lesions noted.       Assessment: Clarke is a 74 YO male with DM2 and neuropathy.  Ulceration on the left dorsal hallux.  Clinically, this has healed.   Onychomycosis  Tyloma.      Plan:   - Pt seen and evaluated  - Nails debrided x 10.   - Tyloma trimmed x 1.   - Daily foot exams.   - Pt to return to clinic in 12 weeks or sooner if problems arise.

## 2025-05-21 NOTE — LETTER
5/21/2025      Clarke Moreira  [address]  [facility] [address]      Dear Colleague,    Thank you for referring your patient, Clarke Moreira, to the [facility] [place]. Please see a copy of my visit note below.    Chief Complaint   Patient presents with     Follow Up     Foot care.               Allergies   Allergen Reactions     Seasonal Allergies          Subjective: Clarke is a 74 year old male who presents to the clinic today for a diabetic foot exam and management.  He has no new open lesions. He sees Dr. Shepard for his diabetic care and was seen 5/8/25. Since his last visit, last year, he has had a few hospitalizations and a few TCU stays. He is getting stronger at home now.     Objective  Hemoglobin A1C   Date Value Ref Range Status   03/26/2025 6.2 (H) 0.0 - 5.6 % Final     Comment:     Normal <5.7%   Prediabetes 5.7-6.4%    Diabetes 6.5% or higher     Note: Adopted from ADA consensus guidelines.   10/07/2024 6.2 (H) <5.7 % Final     Comment:     Normal <5.7%   Prediabetes 5.7-6.4%    Diabetes 6.5% or higher     Note: Adopted from ADA consensus guidelines.   09/26/2024 6.5 (H) <5.7 % Final     Comment:     Normal <5.7%   Prediabetes 5.7-6.4%    Diabetes 6.5% or higher     Note: Adopted from ADA consensus guidelines.   05/06/2021 6.2 (H) 0 - 5.6 % Final     Comment:     Normal <5.7% Prediabetes 5.7-6.4%  Diabetes 6.5% or higher - adopted from ADA   consensus guidelines.     11/09/2020 5.9 (H) 4.1 - 5.7 % Final   07/09/2020 5.9 (H) 0 - 5.6 % Final     Comment:     Normal <5.7% Prediabetes 5.7-6.4%  Diabetes 6.5% or higher - adopted from ADA   consensus guidelines.         DP and PT pulses 1/4 BL. CRT 1 second. Absent pedal hair.  Gross and protective sensations are diminished BL.  Hammertoes to all lesser digits. Hallux malleus BL. Equinus BL. Pes planus.   Onychomycosis to toes x10.  With debridement of the right medial hallux nail, there was a sanguinous bulla  subungually.  No signs or symptoms of infection.  Tyloma noted to the left 5th digit DIPJ, right dorsal hallux IPJ, and right 5th DIPJ  No open lesions noted.       Assessment: Clarke is a 74 YO male with DM2 and neuropathy.  Ulceration on the left dorsal hallux. Clinically, this has healed.   Onychomycosis  Tyloma.      Plan:   - Pt seen and evaluated  - Nails debrided x 10.   - Tyloma trimmed x 1.   - Daily foot exams.   - Pt to return to clinic in 12 weeks or sooner if problems arise.     Again, thank you for allowing me to participate in the care of your patient.        Sincerely,        Jesús Lagos DPM    Electronically signed

## 2025-05-21 NOTE — PROGRESS NOTES
ANTICOAGULATION MANAGEMENT     Clarke Moreira 74 year old male is on warfarin with therapeutic INR result. (Goal INR 2.0-3.0)    Recent labs: (last 7 days)     05/21/25  0721   INR 2.5*       ASSESSMENT     Source(s): Chart Review and Patient/Caregiver Call     Warfarin doses taken: Warfarin taken as instructed  Diet: No new diet changes identified  Medication/supplement changes: None noted  New illness, injury, or hospitalization: No.   5/8/25 - OV DM-11 follow-up - recommended to start Ozempic.  However, patient hesitant  o start until next follow-up in June.  Signs or symptoms of bleeding or clotting: No  Previous result: Therapeutic last visit at 2.9; previously outside of goal range at 3.9  Additional findings: None       PLAN     Recommended plan for no diet, medication or health factor changes affecting INR     Dosing Instructions: Continue your current warfarin dose with next INR in 2-3 weeks       Summary  As of 5/21/2025      Full warfarin instructions:  6 mg every Tue, Fri; 9 mg all other days   Next INR check:  6/11/2025               Telephone call with Angel who verbalizes understanding and agrees to plan    Check at provider office visit - INR on 6/16/25 at follow-up OV with Dr. Shepard    Education provided: Taking warfarin: Importance of taking warfarin as instructed  Goal range and lab monitoring: goal range and significance of current result    Plan made per [facility] anticoagulation protocol    Mirna Mcknight RN  5/21/2025  Anticoagulation Clinic  [employer] for routing messages: benjamin [facility]  ACC patient phone line: [phone]        _______________________________________________________________________     Anticoagulation Episode Summary       Current INR goal:  2.0-3.0   TTR:  70.0% (11.5 mo)   Target end date:  Indefinite   Send INR reminders to:  [facility]    Indications    Chronic atrial fibrillation (H) [I48.20]  Long term (current) use of anticoagulants  [Z79.01]  Paroxysmal atrial fibrillation (H) [I48.0]  Persistent atrial fibrillation (H) [I48.19]             Comments:  [facility] to [facility] when Home Care discharges             Anticoagulation Care Providers       Provider Role Specialty Phone number    Matthias Sanchez MD Referring [facility] [phone]    Farideh Dasilva MD Referring [facility] [phone]    Henrry Shepard MD Referring [facility] [phone]

## 2025-05-22 ENCOUNTER — VIRTUAL VISIT (OUTPATIENT)
Dept: PSYCHOLOGY | Facility: CLINIC | Age: 75
End: 2025-05-22
Payer: COMMERCIAL

## 2025-05-22 DIAGNOSIS — F33.1 MODERATE EPISODE OF RECURRENT MAJOR DEPRESSIVE DISORDER (H): ICD-10-CM

## 2025-05-22 DIAGNOSIS — F41.1 GAD (GENERALIZED ANXIETY DISORDER): ICD-10-CM

## 2025-05-22 DIAGNOSIS — F34.1 PERSISTENT DEPRESSIVE DISORDER: Primary | ICD-10-CM

## 2025-05-22 NOTE — PROGRESS NOTES
Telemedicine Visit: The patient's condition can be safely assessed and treated via an audio only encounter.      Reason for Telemedicine Visit: Patient has requested telehealth visit and Patient unable to travel    Originating Site (Patient Location): Patient's home    Distant Location (provider location):  On-site    Consent:  The patient/guardian has verbally consented to: the potential risks and benefits of telemedicine (video visit) versus in person care; bill my insurance or make self-payment for services provided; and responsibility for payment of non-covered services.     Mode of Communication:  Telephone call via [employer]    As the provider I attest to compliance with applicable laws and regulations related to telemedicine.    [facility] Primary Care: : Integrated Behavioral Health    Behavioral Health Clinician Progress Note    Patient Name: Clarke Moreira          Service Type:  Individual      Service Location:   Phone call (patient / identified key support person reached)     Session Start Time: 8:20  Session End Time: 9:03      Session Length: 38 - 52      Attendees: Client     Service Modality:  Phone Visit    Visit Activities (Refresh list every visit): [facility] Only    Diagnostic Assessment Date: last one was 4/1/25, next due 4/1/26  Treatment Plan Review Date: last one was 4/1/25, next due 10/1/25  See Flowsheets for today's PHQ-9 and CHRIS-7 results  Previous PHQ-9:       3/15/2024     9:48 AM 7/11/2024     9:37 AM 3/26/2025     8:23 AM   PHQ-9 SCORE   PHQ-9 Total Score MyChart 19 (Moderately severe depression) 0 0   PHQ-9 Total Score 19 0 0        Proxy-reported     Previous CHRIS-7:       12/11/2018    10:31 AM 5/10/2019     8:26 AM 9/3/2019    10:35 AM   CHRIS-7 SCORE   Total Score 20 13 19     Treatment Objective(s) Addressed in This Session:  Goal 1: Clarke will learn/use coping skills to continue to reduce intensity/frequency of suicidal thoughts     Goal 2: Clarke will  challenge/reframe negative harsh thoughts that he has about himself.     Current Stressors / Issues:  Clarke has been reflecting on his health and emotional well-being, particularly in light of the multiple hospitalizations and rehabilitation experiences over the past year. These events have prompted significant contemplation of his own mortality. While he previously struggled with frequent and intense suicidal thoughts, he reports that these have become less prominent. Rather than experiencing a desire to no longer be alive, he now describes a more peaceful acceptance of death, should it occur.  Reports feeling some hope, as it seems like the medication that was started by cardiology is really helping to take fluid off and this is making his day to day functioning slightly easier.    In this context, the patient has been engaging in meaningful reflection on the people who have been part of his life, expressing appreciation and gratitude to them. He continues to grapple with feelings of frustration and self-criticism regarding perceived shortcomings and unfulfilled potential. However, he is also beginning to extend some compassion toward himself, recognizing the importance of giving himself oscar for what he has endured and accomplished.    Overall, there appears to be a little shift from despair to a more contemplative and accepting stance, with reduced suicidal ideation and increased emotional connection to others. This suggests a notable development in his emotional resilience and personal insight.    Therapeutic Interventions:  Cognitive Behavioral Therapy (CBT): Identified behavioral changes that can be made to move towards treatment goals.  Reinforced successes reported by patient since last appointment.  Acceptance and Commitment Therapy (ACT): Worked with patient to practice acceptance: making room for painful feelings, urges and sensations, and allowing them to come and go without a struggle. Worked with  patient to practice contact with the present moment: engaging fully with here-and-now experience, with an attitude of openness and curiosity.    Response to treatment interventions:   Patient was engaged in the therapy process.        Progress on Treatment Objective(s) / Homework:  Stable     Medication Review:  No current psychiatric medications prescribed    Medication Compliance:  NA    Changes in Health Issues:   None reported has made it to all three of his doctor's appt that have been scheduled in the past few weeks - an accomplishment we celebrated.     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      Assessment: Current Emotional / Mental Status (status of significant symptoms):  Risk status (Self / Other harm or suicidal ideation)    Patient denies current fears or concerns for personal safety.  Patient denies current or recent suicidal ideation or behaviors.  Patient denies current or recent homicidal ideation or behaviors.  Patient denies current or recent self injurious behavior or ideation.  Patient denies other safety concerns.      Appearance:   N/a telephone visit   Eye Contact:   N/a telephone visit   Psychomotor Behavior: N/a telephone visit   Attitude:   Cooperative  Interested Friendly  Orientation:   All  Speech   Rate / Production: Normal    Volume:  Normal   Mood:    Euthymic  Affect:    Appropriate   Thought Content:  Clear   Thought Form:  Coherent  Logical   Insight:    Good     Diagnoses:  Persistent Depressive Disorder  Major Depression, recurrent, moderate  Generalized Anxiety Disorder    Recommendations & Plan:     Next appt on 6/5  Provided Clarke with the phone number for scheduling so he could call back to schedule appts in relation to physical therapy and lymphedema therapy appointments.    Lety Buenrostro PsyD, LP

## 2025-06-05 ENCOUNTER — APPOINTMENT (OUTPATIENT)
Dept: CT IMAGING | Facility: CLINIC | Age: 75
End: 2025-06-05
Attending: PHYSICIAN ASSISTANT
Payer: COMMERCIAL

## 2025-06-05 ENCOUNTER — APPOINTMENT (OUTPATIENT)
Dept: CT IMAGING | Facility: CLINIC | Age: 75
DRG: 074 | End: 2025-06-05
Attending: PHYSICIAN ASSISTANT
Payer: COMMERCIAL

## 2025-06-05 ENCOUNTER — HOSPITAL ENCOUNTER (INPATIENT)
Facility: CLINIC | Age: 75
End: 2025-06-05
Attending: EMERGENCY MEDICINE | Admitting: STUDENT IN AN ORGANIZED HEALTH CARE EDUCATION/TRAINING PROGRAM
Payer: COMMERCIAL

## 2025-06-05 ENCOUNTER — VIRTUAL VISIT (OUTPATIENT)
Dept: PSYCHOLOGY | Facility: CLINIC | Age: 75
End: 2025-06-05
Payer: COMMERCIAL

## 2025-06-05 ENCOUNTER — APPOINTMENT (OUTPATIENT)
Dept: GENERAL RADIOLOGY | Facility: CLINIC | Age: 75
DRG: 074 | End: 2025-06-05
Attending: PHYSICIAN ASSISTANT
Payer: COMMERCIAL

## 2025-06-05 VITALS
SYSTOLIC BLOOD PRESSURE: 102 MMHG | RESPIRATION RATE: 17 BRPM | DIASTOLIC BLOOD PRESSURE: 86 MMHG | TEMPERATURE: 98.4 F | HEART RATE: 65 BPM | OXYGEN SATURATION: 96 %

## 2025-06-05 DIAGNOSIS — N17.9 ACUTE KIDNEY INJURY: ICD-10-CM

## 2025-06-05 DIAGNOSIS — Z79.01 ANTICOAGULATED: ICD-10-CM

## 2025-06-05 DIAGNOSIS — R29.6 FALLS FREQUENTLY: ICD-10-CM

## 2025-06-05 DIAGNOSIS — I48.19 PERSISTENT ATRIAL FIBRILLATION (H): Primary | ICD-10-CM

## 2025-06-05 DIAGNOSIS — I11.0 HYPERTENSIVE HEART DISEASE WITH HEART FAILURE (H): ICD-10-CM

## 2025-06-05 DIAGNOSIS — I48.11 LONGSTANDING PERSISTENT ATRIAL FIBRILLATION (H): ICD-10-CM

## 2025-06-05 DIAGNOSIS — F33.1 MODERATE EPISODE OF RECURRENT MAJOR DEPRESSIVE DISORDER (H): ICD-10-CM

## 2025-06-05 DIAGNOSIS — F34.1 PERSISTENT DEPRESSIVE DISORDER: Primary | ICD-10-CM

## 2025-06-05 DIAGNOSIS — I50.32 CHRONIC HEART FAILURE WITH PRESERVED EJECTION FRACTION (H): ICD-10-CM

## 2025-06-05 DIAGNOSIS — R53.1 GENERALIZED WEAKNESS: ICD-10-CM

## 2025-06-05 DIAGNOSIS — Z71.89 OTHER SPECIFIED COUNSELING: Chronic | ICD-10-CM

## 2025-06-05 DIAGNOSIS — E87.1 HYPONATREMIA: ICD-10-CM

## 2025-06-05 PROBLEM — N18.30 CHRONIC KIDNEY DISEASE, STAGE 3 (H): Status: ACTIVE | Noted: 2021-06-23

## 2025-06-05 LAB
ALBUMIN SERPL BCG-MCNC: 3.9 G/DL (ref 3.5–5.2)
ALBUMIN UR-MCNC: NEGATIVE MG/DL
ALP SERPL-CCNC: 104 U/L (ref 40–150)
ALT SERPL W P-5'-P-CCNC: 32 U/L (ref 0–70)
ALT SERPL W P-5'-P-CCNC: ABNORMAL U/L
ANION GAP SERPL CALCULATED.3IONS-SCNC: 16 MMOL/L (ref 7–15)
ANION GAP SERPL CALCULATED.3IONS-SCNC: 16 MMOL/L (ref 7–15)
APPEARANCE UR: ABNORMAL
AST SERPL W P-5'-P-CCNC: 45 U/L (ref 0–45)
AST SERPL W P-5'-P-CCNC: ABNORMAL U/L
ATRIAL RATE - MUSE: NORMAL BPM
BASOPHILS # BLD AUTO: 0 10E3/UL (ref 0–0.2)
BASOPHILS NFR BLD AUTO: 0 %
BILIRUB SERPL-MCNC: 0.6 MG/DL
BILIRUB UR QL STRIP: NEGATIVE
BUN SERPL-MCNC: 38.1 MG/DL (ref 8–23)
BUN SERPL-MCNC: 40.5 MG/DL (ref 8–23)
CALCIUM SERPL-MCNC: 9.4 MG/DL (ref 8.8–10.4)
CALCIUM SERPL-MCNC: 9.8 MG/DL (ref 8.8–10.4)
CHLORIDE SERPL-SCNC: 85 MMOL/L (ref 98–107)
CHLORIDE SERPL-SCNC: 87 MMOL/L (ref 98–107)
COLOR UR AUTO: YELLOW
CREAT SERPL-MCNC: 1.4 MG/DL (ref 0.67–1.17)
CREAT SERPL-MCNC: 1.42 MG/DL (ref 0.67–1.17)
CREAT UR-MCNC: 108 MG/DL
DIASTOLIC BLOOD PRESSURE - MUSE: NORMAL MMHG
EGFRCR SERPLBLD CKD-EPI 2021: 52 ML/MIN/1.73M2
EGFRCR SERPLBLD CKD-EPI 2021: 52 ML/MIN/1.73M2
EOSINOPHIL # BLD AUTO: 0 10E3/UL (ref 0–0.7)
EOSINOPHIL NFR BLD AUTO: 0 %
ERYTHROCYTE [DISTWIDTH] IN BLOOD BY AUTOMATED COUNT: 13.7 % (ref 10–15)
EST. AVERAGE GLUCOSE BLD GHB EST-MCNC: 137 MG/DL
FLUAV RNA SPEC QL NAA+PROBE: NEGATIVE
FLUBV RNA RESP QL NAA+PROBE: NEGATIVE
FOLATE SERPL-MCNC: 29 NG/ML (ref 4.6–34.8)
GLUCOSE BLDC GLUCOMTR-MCNC: 104 MG/DL (ref 70–99)
GLUCOSE BLDC GLUCOMTR-MCNC: 121 MG/DL (ref 70–99)
GLUCOSE SERPL-MCNC: 122 MG/DL (ref 70–99)
GLUCOSE SERPL-MCNC: 133 MG/DL (ref 70–99)
GLUCOSE UR STRIP-MCNC: NEGATIVE MG/DL
HBA1C MFR BLD: 6.4 %
HCO3 SERPL-SCNC: 25 MMOL/L (ref 22–29)
HCO3 SERPL-SCNC: 25 MMOL/L (ref 22–29)
HCT VFR BLD AUTO: 43 % (ref 40–53)
HGB BLD-MCNC: 14.3 G/DL (ref 13.3–17.7)
HGB UR QL STRIP: NEGATIVE
IMM GRANULOCYTES # BLD: 0.1 10E3/UL
IMM GRANULOCYTES NFR BLD: 1 %
INR PPP: 2.9 (ref 0.85–1.15)
INTERPRETATION ECG - MUSE: NORMAL
KETONES UR STRIP-MCNC: ABNORMAL MG/DL
LEUKOCYTE ESTERASE UR QL STRIP: NEGATIVE
LYMPHOCYTES # BLD AUTO: 1.1 10E3/UL (ref 0.8–5.3)
LYMPHOCYTES NFR BLD AUTO: 13 %
MAGNESIUM SERPL-MCNC: 2.6 MG/DL (ref 1.7–2.3)
MCH RBC QN AUTO: 26.7 PG (ref 26.5–33)
MCHC RBC AUTO-ENTMCNC: 33.3 G/DL (ref 31.5–36.5)
MCV RBC AUTO: 80 FL (ref 78–100)
MONOCYTES # BLD AUTO: 1.2 10E3/UL (ref 0–1.3)
MONOCYTES NFR BLD AUTO: 14 %
MUCOUS THREADS #/AREA URNS LPF: PRESENT /LPF
NEUTROPHILS # BLD AUTO: 6.3 10E3/UL (ref 1.6–8.3)
NEUTROPHILS NFR BLD AUTO: 72 %
NITRATE UR QL: NEGATIVE
NRBC # BLD AUTO: 0 10E3/UL
NRBC BLD AUTO-RTO: 0 /100
NT-PROBNP SERPL-MCNC: 498 PG/ML (ref 0–852)
P AXIS - MUSE: NORMAL DEGREES
PH UR STRIP: 6 [PH] (ref 5–7)
PLATELET # BLD AUTO: 324 10E3/UL (ref 150–450)
POTASSIUM SERPL-SCNC: 3.4 MMOL/L (ref 3.4–5.3)
POTASSIUM SERPL-SCNC: 3.9 MMOL/L (ref 3.4–5.3)
POTASSIUM SERPL-SCNC: 4.2 MMOL/L (ref 3.4–5.3)
PR INTERVAL - MUSE: NORMAL MS
PROT SERPL-MCNC: 7.7 G/DL (ref 6.4–8.3)
PROTHROMBIN TIME: 30.2 SECONDS (ref 11.8–14.8)
QRS DURATION - MUSE: 112 MS
QT - MUSE: 378 MS
QTC - MUSE: 467 MS
R AXIS - MUSE: 26 DEGREES
RBC # BLD AUTO: 5.36 10E6/UL (ref 4.4–5.9)
RBC URINE: <1 /HPF
RSV RNA SPEC NAA+PROBE: NEGATIVE
SARS-COV-2 RNA RESP QL NAA+PROBE: NEGATIVE
SODIUM SERPL-SCNC: 126 MMOL/L (ref 135–145)
SODIUM SERPL-SCNC: 128 MMOL/L (ref 135–145)
SODIUM UR-SCNC: <20 MMOL/L
SP GR UR STRIP: 1.02 (ref 1–1.03)
SQUAMOUS EPITHELIAL: <1 /HPF
SYSTOLIC BLOOD PRESSURE - MUSE: NORMAL MMHG
T AXIS - MUSE: 8 DEGREES
TROPONIN T SERPL HS-MCNC: 32 NG/L
TROPONIN T SERPL HS-MCNC: 33 NG/L
TSH SERPL DL<=0.005 MIU/L-ACNC: 0.48 UIU/ML (ref 0.3–4.2)
UROBILINOGEN UR STRIP-MCNC: NORMAL MG/DL
UUN UR-MCNC: 905 MG/DL (ref 801–1666)
VENTRICULAR RATE- MUSE: 92 BPM
VIT B12 SERPL-MCNC: 392 PG/ML (ref 232–1245)
WBC # BLD AUTO: 8.7 10E3/UL (ref 4–11)
WBC URINE: 1 /HPF

## 2025-06-05 PROCEDURE — 84484 ASSAY OF TROPONIN QUANT: CPT | Performed by: PHYSICIAN ASSISTANT

## 2025-06-05 PROCEDURE — 250N000013 HC RX MED GY IP 250 OP 250 PS 637: Performed by: EMERGENCY MEDICINE

## 2025-06-05 PROCEDURE — 83735 ASSAY OF MAGNESIUM: CPT | Performed by: PHYSICIAN ASSISTANT

## 2025-06-05 PROCEDURE — 84300 ASSAY OF URINE SODIUM: CPT | Performed by: STUDENT IN AN ORGANIZED HEALTH CARE EDUCATION/TRAINING PROGRAM

## 2025-06-05 PROCEDURE — 82962 GLUCOSE BLOOD TEST: CPT

## 2025-06-05 PROCEDURE — 36415 COLL VENOUS BLD VENIPUNCTURE: CPT | Performed by: PHYSICIAN ASSISTANT

## 2025-06-05 PROCEDURE — 74177 CT ABD & PELVIS W/CONTRAST: CPT | Mod: 26 | Performed by: RADIOLOGY

## 2025-06-05 PROCEDURE — 84155 ASSAY OF PROTEIN SERUM: CPT | Performed by: PHYSICIAN ASSISTANT

## 2025-06-05 PROCEDURE — 250N000013 HC RX MED GY IP 250 OP 250 PS 637

## 2025-06-05 PROCEDURE — 250N000011 HC RX IP 250 OP 636: Performed by: PHYSICIAN ASSISTANT

## 2025-06-05 PROCEDURE — 82570 ASSAY OF URINE CREATININE: CPT | Performed by: STUDENT IN AN ORGANIZED HEALTH CARE EDUCATION/TRAINING PROGRAM

## 2025-06-05 PROCEDURE — 85610 PROTHROMBIN TIME: CPT | Performed by: PHYSICIAN ASSISTANT

## 2025-06-05 PROCEDURE — 81001 URINALYSIS AUTO W/SCOPE: CPT | Performed by: PHYSICIAN ASSISTANT

## 2025-06-05 PROCEDURE — 258N000003 HC RX IP 258 OP 636: Performed by: PHYSICIAN ASSISTANT

## 2025-06-05 PROCEDURE — 70450 CT HEAD/BRAIN W/O DYE: CPT | Mod: 26 | Performed by: RADIOLOGY

## 2025-06-05 PROCEDURE — 71046 X-RAY EXAM CHEST 2 VIEWS: CPT

## 2025-06-05 PROCEDURE — 74177 CT ABD & PELVIS W/CONTRAST: CPT

## 2025-06-05 PROCEDURE — 83880 ASSAY OF NATRIURETIC PEPTIDE: CPT | Performed by: STUDENT IN AN ORGANIZED HEALTH CARE EDUCATION/TRAINING PROGRAM

## 2025-06-05 PROCEDURE — 70450 CT HEAD/BRAIN W/O DYE: CPT

## 2025-06-05 PROCEDURE — 36415 COLL VENOUS BLD VENIPUNCTURE: CPT | Performed by: EMERGENCY MEDICINE

## 2025-06-05 PROCEDURE — 84132 ASSAY OF SERUM POTASSIUM: CPT | Performed by: EMERGENCY MEDICINE

## 2025-06-05 PROCEDURE — 83036 HEMOGLOBIN GLYCOSYLATED A1C: CPT

## 2025-06-05 PROCEDURE — 82040 ASSAY OF SERUM ALBUMIN: CPT | Performed by: PHYSICIAN ASSISTANT

## 2025-06-05 PROCEDURE — 85025 COMPLETE CBC W/AUTO DIFF WBC: CPT | Performed by: PHYSICIAN ASSISTANT

## 2025-06-05 PROCEDURE — 72131 CT LUMBAR SPINE W/O DYE: CPT | Mod: 26 | Performed by: RADIOLOGY

## 2025-06-05 PROCEDURE — 84460 ALANINE AMINO (ALT) (SGPT): CPT | Performed by: EMERGENCY MEDICINE

## 2025-06-05 PROCEDURE — 120N000002 HC R&B MED SURG/OB UMMC

## 2025-06-05 PROCEDURE — 36415 COLL VENOUS BLD VENIPUNCTURE: CPT

## 2025-06-05 PROCEDURE — 82746 ASSAY OF FOLIC ACID SERUM: CPT

## 2025-06-05 PROCEDURE — 84540 ASSAY OF URINE/UREA-N: CPT | Performed by: STUDENT IN AN ORGANIZED HEALTH CARE EDUCATION/TRAINING PROGRAM

## 2025-06-05 PROCEDURE — 84443 ASSAY THYROID STIM HORMONE: CPT

## 2025-06-05 PROCEDURE — 71046 X-RAY EXAM CHEST 2 VIEWS: CPT | Mod: 26 | Performed by: RADIOLOGY

## 2025-06-05 PROCEDURE — 999N000104 CT LUMBAR SPINE RECONSTRUCTED

## 2025-06-05 PROCEDURE — 82607 VITAMIN B-12: CPT

## 2025-06-05 PROCEDURE — 99207 PR APP CREDIT; MD BILLING SHARED VISIT: CPT

## 2025-06-05 PROCEDURE — 82310 ASSAY OF CALCIUM: CPT

## 2025-06-05 PROCEDURE — 82435 ASSAY OF BLOOD CHLORIDE: CPT

## 2025-06-05 PROCEDURE — 87637 SARSCOV2&INF A&B&RSV AMP PRB: CPT | Performed by: PHYSICIAN ASSISTANT

## 2025-06-05 PROCEDURE — 99222 1ST HOSP IP/OBS MODERATE 55: CPT | Mod: FS | Performed by: STUDENT IN AN ORGANIZED HEALTH CARE EDUCATION/TRAINING PROGRAM

## 2025-06-05 RX ORDER — SPIRONOLACTONE 50 MG/1
50 TABLET, FILM COATED ORAL DAILY
Status: ACTIVE | OUTPATIENT
Start: 2025-06-06

## 2025-06-05 RX ORDER — ACETAMINOPHEN 650 MG/1
650 SUPPOSITORY RECTAL EVERY 4 HOURS PRN
Status: ACTIVE | OUTPATIENT
Start: 2025-06-05

## 2025-06-05 RX ORDER — NICOTINE POLACRILEX 4 MG
15-30 LOZENGE BUCCAL
Status: ACTIVE | OUTPATIENT
Start: 2025-06-05

## 2025-06-05 RX ORDER — AMOXICILLIN 250 MG
1 CAPSULE ORAL 2 TIMES DAILY PRN
Status: ACTIVE | OUTPATIENT
Start: 2025-06-05

## 2025-06-05 RX ORDER — POTASSIUM CHLORIDE 1500 MG/1
40 TABLET, EXTENDED RELEASE ORAL 2 TIMES DAILY
Status: ACTIVE | OUTPATIENT
Start: 2025-06-05

## 2025-06-05 RX ORDER — IOPAMIDOL 755 MG/ML
135 INJECTION, SOLUTION INTRAVASCULAR ONCE
Status: COMPLETED | OUTPATIENT
Start: 2025-06-05 | End: 2025-06-05

## 2025-06-05 RX ORDER — CHLORTHALIDONE 25 MG/1
25 TABLET ORAL DAILY
Status: ACTIVE | OUTPATIENT
Start: 2025-06-06

## 2025-06-05 RX ORDER — ASPIRIN 81 MG/1
81 TABLET, CHEWABLE ORAL DAILY
Status: DISPENSED | OUTPATIENT
Start: 2025-06-05

## 2025-06-05 RX ORDER — LIDOCAINE 40 MG/G
CREAM TOPICAL
Status: ACTIVE | OUTPATIENT
Start: 2025-06-05

## 2025-06-05 RX ORDER — ONDANSETRON 2 MG/ML
4 INJECTION INTRAMUSCULAR; INTRAVENOUS EVERY 6 HOURS PRN
Status: ACTIVE | OUTPATIENT
Start: 2025-06-05

## 2025-06-05 RX ORDER — AMOXICILLIN 250 MG
1 CAPSULE ORAL 2 TIMES DAILY
Status: DISCONTINUED | OUTPATIENT
Start: 2025-06-05 | End: 2025-06-05

## 2025-06-05 RX ORDER — ACETAMINOPHEN 325 MG/1
650 TABLET ORAL EVERY 4 HOURS PRN
Status: ACTIVE | OUTPATIENT
Start: 2025-06-05

## 2025-06-05 RX ORDER — LIDOCAINE 4 G/G
2 PATCH TOPICAL
Status: DISPENSED | OUTPATIENT
Start: 2025-06-05

## 2025-06-05 RX ORDER — ONDANSETRON 4 MG/1
4 TABLET, ORALLY DISINTEGRATING ORAL EVERY 6 HOURS PRN
Status: ACTIVE | OUTPATIENT
Start: 2025-06-05

## 2025-06-05 RX ORDER — DEXTROSE MONOHYDRATE 25 G/50ML
25-50 INJECTION, SOLUTION INTRAVENOUS
Status: ACTIVE | OUTPATIENT
Start: 2025-06-05

## 2025-06-05 RX ORDER — AMOXICILLIN 250 MG
2 CAPSULE ORAL 2 TIMES DAILY
Status: DISCONTINUED | OUTPATIENT
Start: 2025-06-05 | End: 2025-06-05

## 2025-06-05 RX ORDER — TORSEMIDE 100 MG/1
100 TABLET ORAL DAILY
Status: DISPENSED | OUTPATIENT
Start: 2025-06-06

## 2025-06-05 RX ORDER — POLYETHYLENE GLYCOL 3350 17 G/17G
17 POWDER, FOR SOLUTION ORAL DAILY
Status: DISPENSED | OUTPATIENT
Start: 2025-06-05

## 2025-06-05 RX ORDER — ATORVASTATIN CALCIUM 40 MG/1
40 TABLET, FILM COATED ORAL EVERY EVENING
Status: DISPENSED | OUTPATIENT
Start: 2025-06-05

## 2025-06-05 RX ORDER — MAGNESIUM OXIDE 400 MG/1
400 TABLET ORAL DAILY
Status: DISPENSED | OUTPATIENT
Start: 2025-06-05

## 2025-06-05 RX ORDER — VITAMIN B COMPLEX
25 TABLET ORAL DAILY
Status: ACTIVE | OUTPATIENT
Start: 2025-06-06

## 2025-06-05 RX ORDER — CALCIUM CARBONATE 500 MG/1
1000 TABLET, CHEWABLE ORAL 4 TIMES DAILY PRN
Status: ACTIVE | OUTPATIENT
Start: 2025-06-05

## 2025-06-05 RX ORDER — AMOXICILLIN 250 MG
2 CAPSULE ORAL 2 TIMES DAILY PRN
Status: ACTIVE | OUTPATIENT
Start: 2025-06-05

## 2025-06-05 RX ADMIN — SODIUM CHLORIDE 1000 ML: 0.9 INJECTION, SOLUTION INTRAVENOUS at 17:58

## 2025-06-05 RX ADMIN — ASPIRIN 81 MG CHEWABLE TABLET 81 MG: 81 TABLET CHEWABLE at 20:58

## 2025-06-05 RX ADMIN — MAGNESIUM OXIDE TAB 400 MG (241.3 MG ELEMENTAL MG) 400 MG: 400 (241.3 MG) TAB at 20:57

## 2025-06-05 RX ADMIN — ATORVASTATIN CALCIUM 40 MG: 40 TABLET, FILM COATED ORAL at 20:57

## 2025-06-05 RX ADMIN — WARFARIN SODIUM 9 MG: 7.5 TABLET ORAL at 18:48

## 2025-06-05 RX ADMIN — IOPAMIDOL 135 ML: 755 INJECTION, SOLUTION INTRAVENOUS at 14:06

## 2025-06-05 RX ADMIN — POLYETHYLENE GLYCOL 3350 17 G: 17 POWDER, FOR SOLUTION ORAL at 20:58

## 2025-06-05 ASSESSMENT — ACTIVITIES OF DAILY LIVING (ADL)
ADLS_ACUITY_SCORE: 65

## 2025-06-05 ASSESSMENT — COLUMBIA-SUICIDE SEVERITY RATING SCALE - C-SSRS
6. HAVE YOU EVER DONE ANYTHING, STARTED TO DO ANYTHING, OR PREPARED TO DO ANYTHING TO END YOUR LIFE?: NO
2. HAVE YOU ACTUALLY HAD ANY THOUGHTS OF KILLING YOURSELF IN THE PAST MONTH?: NO
1. IN THE PAST MONTH, HAVE YOU WISHED YOU WERE DEAD OR WISHED YOU COULD GO TO SLEEP AND NOT WAKE UP?: NO

## 2025-06-05 NOTE — PHARMACY-ANTICOAGULATION SERVICE
Clinical Pharmacy - Warfarin Dosing Consult     Pharmacy has been consulted to manage this patient s warfarin therapy.  Indication: Atrial Fibrillation  Therapy Goal: INR 2-3  OP Anticoag Clinic: [facility]  Warfarin Prior to Admission: Yes  Warfarin PTA Regimen: 6 mg every Tue, Fri; 9 mg all other days    INR   Date Value Ref Range Status   06/05/2025 2.90 (H) 0.85 - 1.15 Final   05/21/2025 2.5 (H) 0.9 - 1.1 Final       Recommend warfarin 9 mg today.  Pharmacy will monitor Clarke RENÉE SolomonMoreira daily and order warfarin doses to achieve specified goal.      Please contact pharmacy as soon as possible if the warfarin needs to be held for a procedure or if the warfarin goals change.      Rebecca Tran, PharmD, BCEMP, BCCCP, BCPS

## 2025-06-05 NOTE — PROGRESS NOTES
Telemedicine Visit: The patient's condition can be safely assessed and treated via an audio only encounter.      Reason for Telemedicine Visit: Patient has requested telehealth visit and Patient unable to travel    Originating Site (Patient Location): Patient's home    Distant Location (provider location):  On-site    Consent:  The patient/guardian has verbally consented to: the potential risks and benefits of telemedicine (video visit) versus in person care; bill my insurance or make self-payment for services provided; and responsibility for payment of non-covered services.     Mode of Communication:  Telephone call via [employer]    As the provider I attest to compliance with applicable laws and regulations related to telemedicine.    [facility] Primary Care: : Integrated Behavioral Health    Behavioral Health Clinician Progress Note    Patient Name: Clarke Moreira          Service Type:  Individual      Service Location:   Phone call (patient / identified key support person reached)     Session Start Time: 8:21  Session End Time: 8:47      Session Length: 16 - 37      Attendees: Client     Service Modality:  Phone Visit    Visit Activities (Refresh list every visit): [facility] Only    Diagnostic Assessment Date: last one was 4/1/25, next due 4/1/26  Treatment Plan Review Date: last one was 4/1/25, next due 10/1/25  See Flowsheets for today's PHQ-9 and CHRIS-7 results  Previous PHQ-9:       3/15/2024     9:48 AM 7/11/2024     9:37 AM 3/26/2025     8:23 AM   PHQ-9 SCORE   PHQ-9 Total Score MyChart 19 (Moderately severe depression) 0 0   PHQ-9 Total Score 19 0 0        Proxy-reported     Previous CHRIS-7:       12/11/2018    10:31 AM 5/10/2019     8:26 AM 9/3/2019    10:35 AM   CHRIS-7 SCORE   Total Score 20 13 19     Treatment Objective(s) Addressed in This Session:  Goal 1: Clarke will learn/use coping skills to continue to reduce intensity/frequency of suicidal thoughts     Goal 2: Clarke will  challenge/reframe negative harsh thoughts that he has about himself.     Current Stressors / Issues:  Clarke reports feeling very poorly today.  Had two falls over the weekend.  Having a very hard time walking. Hasn't been eating very  much.  States he has been lying in his bed since 1 am trying to figure out how to get to the bathroom. Has been soiling himself because he can't there. Feeling very weak.  Had EMT's there over the weekend, they said his vitals were stable. Clarke chose to not have them transport him because he had an appt with Dr. Shepard on Monday.  It looks like that appt is not this coming week, but in two Mondays. He is very worried that even if someone comes over to help him to the bathroom, he may fall again and then they won't be able to get him up.  Missing appointments because he has felt too weak to go. Was trying to  something and couldn't hold onto it.  Air conditioning in his apartment is also not working.  Wasn't sure if he should call EMS again.  Expressed my concern for his current condition. Clarke agreed for me to have our triage nurse come to talk to him.  Triage nurse consulted with Dr. Shepard and she communicated to Clarke that they think he should go to the ER. He asked if she could call 911 which she did.      Therapeutic Interventions:  Safety: Discussed current physical safety due to medical conditions with patient as above and discussed plan to go to the ER for evaluation of medical conditions.    Response to treatment interventions:   Patient was engaged in the therapy process.      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      Assessment: Current Emotional / Mental Status (status  of significant symptoms):  Risk status (Self / Other harm or suicidal ideation)    Patient denies current fears or concerns for personal safety.  Patient denies current or recent suicidal ideation or behaviors.  Patient denies current or recent homicidal ideation or behaviors.  Patient denies current or recent self injurious behavior or ideation.  Patient denies other safety concerns.      Appearance:   N/a telephone visit   Eye Contact:   N/a telephone visit   Psychomotor Behavior: N/a telephone visit   Attitude:   Cooperative  Attentive  Orientation:   All  Speech   Rate / Production: Slow    Volume:  Normal   Mood:    Frustrated   Affect:    Subdued   Thought Content:  Clear   Thought Form:  Coherent  Logical   Insight:    Good     Diagnoses:  Persistent Depressive Disorder  Major Depression, recurrent, moderate  Generalized Anxiety Disorder    Recommendations & Plan:     Next appt on 6/19  EMS on their way to [facility] home  Check in about physical therapy and lymphedema therapy appointments.    Lety Buenrostro PsyD, LP

## 2025-06-05 NOTE — ED PROVIDER NOTES
ED Provider Note  [facility]      History     Chief Complaint   Patient presents with    Generalized Weakness     HPI  Clarke Moreira is a 75 year old male with complex past medical history including history of chronic atrial fibrillation on aspirin and warfarin, history of hypothyroidism, type 2 diabetes, morbid obesity, major depression, who presents to the emergency room with concerns for generalized weakness.    Patient presents alone.  He notes over the last 2 weeks he has been generally more fatigued, and feeling generally more weak.  He notes he currently lives at home uses a walker to ambulate.  He states that on Saturday, 5 days ago he had a fall, notes that this was not truly a fall he was able lower himself to his buttocks did not hit his head.  He notes another fall which was more forceful Sunday, the next day where he fell on his buttocks again.  Since that time has been doing with ongoing bilateral pelvic pain.  He has been able to ambulate.  Denies any head injury denies any neck pain back pain.  Patient denies any associated fevers runny nose cough dyspnea chest pain.  He tells me he has had some left-sided abdominal pain over the last 10 days reports some constipation with this although had a bowel movement earlier today.  He states he has had dark stools although is taking iron.  No bloody stools.  No vomiting.  No nausea.  No urinary symptoms other than some darker urine no burning.  He has been taking his home medications including his warfarin.  He notes he does not have a caregiver and lives alone and has been unable to get out of bed due to his weakness.    Past Medical History  Past Medical History:   Diagnosis Date    Atrial fibrillation (H)     Basal cell carcinoma     Chronic anticoagulation     Diastolic heart failure (H)     Dyslipidemia     Essential hypertension     Morbid obesity (H)     Sleep-disordered breathing     Type 2 diabetes mellitus (H)       Past Surgical History:   Procedure Laterality Date    BIOPSY OF SKIN LESION      MOHS MICROGRAPHIC PROCEDURE      PICC INSERTION Right 09/24/2017    5fr TL Bard PICC, 51cm (1cm external) in the R medial brachial vein w/ tip in the SVC.     aspirin (ASA) 81 MG chewable tablet  atorvastatin (LIPITOR) 40 MG tablet  CERTAVITE/ANTIOXIDANTS tablet  chlorthalidone (HYGROTON) 25 MG tablet  FEROSUL 325 (65 Fe) MG tablet  levothyroxine (SYNTHROID/LEVOTHROID) 175 MCG tablet  magnesium oxide (MAG-OX) 400 MG tablet  metFORMIN (GLUCOPHAGE) 500 MG tablet  Omega-3 Fatty Acids (EQL FISH OIL) 1000 MG CAPS  potassium chloride chen ER (KLOR-CON M20) 20 MEQ CR tablet  senna-docusate (SENEXON-S) 8.6-50 MG tablet  spironolactone (ALDACTONE) 50 MG tablet  torsemide (DEMADEX) 20 MG tablet  urea (CARMOL) 10 % external lotion  vitamin C (ASCORBIC ACID) 1000 MG TABS  Vitamin D, Cholecalciferol, 25 MCG (1000 UT) CAPS  warfarin ANTICOAGULANT (COUMADIN) 3 MG tablet  hypromellose (ARTIFICIAL TEARS) 0.5 % SOLN ophthalmic solution      Allergies   Allergen Reactions    Seasonal Allergies      Family History  Family History   Problem Relation Age of Onset    Depression Mother     Glaucoma Maternal Grandfather     Cancer No family hx of         No family history of skin cancer    Macular Degeneration No family hx of      Social History   Social History     Tobacco Use    Smoking status: Never    Smokeless tobacco: Never   Substance Use Topics    Alcohol use: No     Comment: Former alcohol abuse. Quit 70s then quit later in 80s-present    Drug use: No     Comment: history of LSD use      A medically appropriate review of systems was performed with pertinent positives and negatives noted in the HPI, and all other systems negative.    Physical Exam   BP: (!) 124/109  Pulse: 104  Temp: 98.1  F (36.7  C)  Resp: 18  SpO2: 98 %  Physical Exam    GENERAL APPEARANCE: The patient is well developed, well appearing, and in no acute distress.  HEAD:   Normocephalic.  EENT: Voice normal.  Uvula midline without exudates.  NECK: Trachea is midline.  Uvula midline without exudates.  No midline C-spine T-spine L-spine tenderness to palpation  LUNGS: Breath sounds are equal and clear bilaterally. No wheezes, rhonchi, or rales.  HEART: Regular rate and normal rhythm.   ABDOMEN: Patient has mild left lower quadrant tenderness to palpation without rebound or other focal tenderness.  EXTREMITIES: No cyanosis, clubbing, or edema.  NEUROLOGIC: No focal sensory or motor deficits are noted.  Patient does appear generally weak and needs assistance sitting up in bed.  PSYCHIATRIC: The patient is awake, alert.  Appropriate mood and affect.  SKIN: Warm, dry, and well perfused.    ED Course, Procedures, & Data        EKG 6/5/2025: Atrial fibrillation, ventricular rate 92 QTc 467.  On my interpretation A-fib is present, similar to prior from 12/9/2024.  My interpretation there is no visible ST elevation or depression to suggest ischemia.     Results for orders placed or performed during the hospital encounter of 06/05/25   CT Abdomen Pelvis w Contrast     Status: None    Narrative    EXAMINATION: CT ABDOMEN PELVIS W CONTRAST, 6/5/2025 2:37 PM    INDICATION: Left lower quadrant pain, generalized weakness, fall 4  days ago with bilateral hip pain, further evaluate    COMPARISON STUDY: 9/22/2024    TECHNIQUE: CT scan of the abdomen and pelvis was performed on  multidetector CT scanner using volumetric acquisition technique and  images were reconstructed in multiple planes with variable thickness  and reviewed on dedicated workstations.     CONTRAST: 135cc of isovue 370 injected IV without oral contrast    CT scan radiation dose is optimized to minimum requisite dose using  automated dose modulation techniques.    FINDINGS:    Lower thorax: Normal.    Liver: No mass. No intrahepatic biliary ductal dilation.    Biliary System: Cholelithiasis without cholecystitis.    Pancreas: No mass or  pancreatic ductal dilation.    Adrenal glands: No mass or nodules    Spleen: Normal.    Kidneys: No suspicious mass, obstructing calculus or hydronephrosis.   Nonobstructing stone in the lower pole of the left kidney measuring 3  mm with second stone measuring 2 mm in the lower pole more  posteriorly. 2 mm right lower pole nonobstructing renal stone.    Gastrointestinal tract :Normal appendix. Normal caliber small bowel.    Mesentery/peritoneum/retroperitoneum: Small fat-containing umbilical  hernia. There No mass. No free fluid or air.    Lymph nodes: No significant lymphadenopathy.    Vasculature: Patent major abdominal vasculature.    Pelvis: Urinary bladder is normal.  1  Osseous structures: No aggressive or acute osseous lesion.      Soft tissues: Within normal limits.       Impression    IMPRESSION:   1. No acute abnormality within the abdomen or pelvis.  2. Cholelithiasis without cholecystitis.  3. Nonobstructing lower pole renal stones measure up to 3 mm.    I have personally reviewed the examination and initial interpretation  and I agree with the findings.    AILYN RAMIREZ MD         SYSTEM ID:  [id number]   Chest XR,  PA & LAT     Status: None    Narrative    EXAM: XR CHEST 2 VIEWS  6/5/2025 11:56 AM     HISTORY:  Generalized weakness       COMPARISON:  Chest radiograph 3/6/2019, CT chest 9/22/2024    FINDINGS:   Trachea is midline. Cardiac silhouette is within normal limits.  Calcifications of the aortic knob. No focal consolidative opacity. No  pleural effusion. No pneumothorax.    No acute osseous abnormality. Visualized soft tissues and upper  abdomen are unremarkable.         Impression    IMPRESSION:   No acute focal airspace disease.    I have personally reviewed the examination and initial interpretation  and I agree with the findings.    SOL CRAIG MD         SYSTEM ID:  [id number]   Head CT w/o contrast     Status: None    Narrative    EXAM: CT HEAD W/O CONTRAST  6/5/2025 2:30 PM      HISTORY:  Headache, anticoagulated evaluate spontaneous bleed       COMPARISON:  CT 12/7/2024    TECHNIQUE: Using multidetector thin collimation helical acquisition  technique, axial, coronal and sagittal CT images from the skull base  to the vertex were obtained without intravenous contrast.   (topogram) image(s) also obtained and reviewed.    FINDINGS:  No acute intracranial hemorrhage, mass effect, or midline shift. No  acute loss of gray-white matter differentiation in the cerebral  hemispheres. 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 Ventricles are proportionate to the cerebral  sulci. Clear basal cisterns.    The bony calvaria and the bones of the skull base are normal. The  visualized portions of the paranasal sinuses and mastoid air cells are  clear. Grossly normal orbits.       Impression    IMPRESSION:   1. No acute intracranial pathology.  2. Unchanged chronic infarcts within the bilateral occipital lobes,  left greater than right     I have personally reviewed the examination and initial interpretation  and I agree with the findings.    TARAN AMADO MD         SYSTEM ID:  [id number]   CT Lumbar Spine Reconstructed     Status: None    Narrative    CT LUMBAR SPINE RECONSTRUCTED 6/5/2025 2:38 PM    History: Fall, pain, evaluate fracture.    Comparison: Lumbar spine CT 10/25/2024.    Technique: Reconstructed axial, coronal and sagittal CT images through  the lumbar spine were obtained from same day CT abdomen.     Findings:   Limited imaging. There are 5 lumbar type vertebrae. Regarding  alignment, mild levocurvature of the lumbar spine. There is moderate  to severe disc space narrowing at L4-5, otherwise preserved disc  spaces. Mild to moderate spondylosis of the lumbar spine with endplate  spurring, osteophytic bridging and facet arthropathy.     Please see same day CT abdomen for intra-abdominal and thoracic  findings.    Findings on a level by level  basis are as follows:    L1-L2: No spinal canal or neuroforaminal stenosis.    L2-L3: No spinal canal or neuroforaminal stenosis.    L3-L4: Bilateral moderate neural foraminal stenosis. No spinal canal  stenosis.    L4-L5: Severe bilateral neural foraminal stenosis. No spinal canal  stenosis with    L5-S1: No spinal canal or neuroforaminal stenosis.      Impression    Impression:  1.  No acute fracture or traumatic dislocation of the lumbar spine.  2.  Multilevel lumbar degenerative changes, most pronounced at L3-4  and L4-5 with moderate and severe neural foraminal stenosis,  respectively. No high-grade spinal canal stenosis.     I have personally reviewed the examination and initial interpretation  and I agree with the findings.    TARAN AMADO MD         SYSTEM ID:  [id number]   Comprehensive metabolic panel     Status: Abnormal   Result Value Ref Range    Sodium 126 (L) 135 - 145 mmol/L    Potassium 4.2 3.4 - 5.3 mmol/L    Carbon Dioxide (CO2) 25 22 - 29 mmol/L    Anion Gap 16 (H) 7 - 15 mmol/L    Urea Nitrogen 40.5 (H) 8.0 - 23.0 mg/dL    Creatinine 1.42 (H) 0.67 - 1.17 mg/dL    GFR Estimate 52 (L) >60 mL/min/1.73m2    Calcium 9.8 8.8 - 10.4 mg/dL    Chloride 85 (L) 98 - 107 mmol/L    Glucose 122 (H) 70 - 99 mg/dL    Alkaline Phosphatase 104 40 - 150 U/L    AST      ALT      Protein Total 7.7 6.4 - 8.3 g/dL    Albumin 3.9 3.5 - 5.2 g/dL    Bilirubin Total 0.6 <=1.2 mg/dL   Troponin T, High Sensitivity     Status: Abnormal   Result Value Ref Range    Troponin T, High Sensitivity 33 (H) <=22 ng/L   UA with Microscopic reflex to Culture     Status: Abnormal    Specimen: Urine, Midstream   Result Value Ref Range    Color Urine Yellow Colorless, Straw, Light Yellow, Yellow    Appearance Urine Slightly Cloudy (A) Clear    Glucose Urine Negative Negative mg/dL    Bilirubin Urine Negative Negative    Ketones Urine Trace (A) Negative mg/dL    Specific Gravity Urine 1.017 1.003 - 1.035    Blood Urine Negative Negative     pH Urine 6.0 5.0 - 7.0    Protein Albumin Urine Negative Negative mg/dL    Urobilinogen Urine Normal Normal mg/dL    Nitrite Urine Negative Negative    Leukocyte Esterase Urine Negative Negative    Mucus Urine Present (A) None Seen /LPF    RBC Urine <1 <=2 /HPF    WBC Urine 1 <=5 /HPF    Squamous Epithelials Urine <1 <=1 /HPF    Narrative    Urine Culture not indicated   INR     Status: Abnormal   Result Value Ref Range    INR 2.90 (H) 0.85 - 1.15    PT 30.2 (H) 11.8 - 14.8 Seconds   Magnesium     Status: Abnormal   Result Value Ref Range    Magnesium 2.6 (H) 1.7 - 2.3 mg/dL   Influenza A/B, RSV and SARS-CoV2 PCR (COVID-19) Nasopharyngeal     Status: Normal    Specimen: Nasopharyngeal; Swab   Result Value Ref Range    Influenza A PCR Negative Negative    Influenza B PCR Negative Negative    RSV PCR Negative Negative    SARS CoV2 PCR Negative Negative    Narrative    Testing was performed using the Xpert Xpress CoV2/Flu/RSV Assay on the [email or web address] GeneXpert Instrument. This test should be ordered for the detection of SARS-CoV2, influenza, and RSV viruses in individuals with signs and symptoms of respiratory tract infection. This test is for in vitro diagnostic use under the [place] FDA for laboratories certified under CLIA to perform high or moderate complexity testing. This test has been [place] FDA cleared. A negative result does not rule out the presence of PCR inhibitors in the specimen or target RNA in concentration below the limit of detection for the assay. If only one viral target is positive but coinfection with multiple targets is suspected, the sample should be re-tested with another FDA cleared, approved, or authorized test, if coninfection would change clinical management. This test was validated by the [facility]. These laboratories are certified under the Clinical Laboratory Improvement Amendments of 1988 (CLIA-88) as qualified to perfom high complexity laboratory testing.   CBC with  platelets and differential     Status: None   Result Value Ref Range    WBC Count 8.7 4.0 - 11.0 10e3/uL    RBC Count 5.36 4.40 - 5.90 10e6/uL    Hemoglobin 14.3 13.3 - 17.7 g/dL    Hematocrit 43.0 40.0 - 53.0 %    MCV 80 78 - 100 fL    MCH 26.7 26.5 - 33.0 pg    MCHC 33.3 31.5 - 36.5 g/dL    RDW 13.7 10.0 - 15.0 %    Platelet Count 324 150 - 450 10e3/uL    % Neutrophils 72 %    % Lymphocytes 13 %    % Monocytes 14 %    % Eosinophils 0 %    % Basophils 0 %    % Immature Granulocytes 1 %    NRBCs per 100 WBC 0 <1 /100    Absolute Neutrophils 6.3 1.6 - 8.3 10e3/uL    Absolute Lymphocytes 1.1 0.8 - 5.3 10e3/uL    Absolute Monocytes 1.2 0.0 - 1.3 10e3/uL    Absolute Eosinophils 0.0 0.0 - 0.7 10e3/uL    Absolute Basophils 0.0 0.0 - 0.2 10e3/uL    Absolute Immature Granulocytes 0.1 <=0.4 10e3/uL    Absolute NRBCs 0.0 10e3/uL   AST     Status: Normal   Result Value Ref Range    AST 45 0 - 45 U/L   ALT     Status: Normal   Result Value Ref Range    ALT 32 0 - 70 U/L   Potassium     Status: Normal   Result Value Ref Range    Potassium 3.9 3.4 - 5.3 mmol/L   Urea nitrogen random urine with Creat Ratio     Status: Normal   Result Value Ref Range    Urea Nitrogen Urine mg/dL 905.0 801.0 - 1,666.0 mg/dL   Creatinine random urine     Status: None   Result Value Ref Range    Creatinine Urine mg/dL 108.0 mg/dL   Sodium random urine     Status: None   Result Value Ref Range    Sodium Urine mmol/L <20 mmol/L   NT-proBNP     Status: Normal   Result Value Ref Range    NT-proBNP 498 0 - 852 pg/mL   Troponin T, High Sensitivity     Status: Abnormal   Result Value Ref Range    Troponin T, High Sensitivity 32 (H) <=22 ng/L   TSH with free T4 reflex     Status: Normal   Result Value Ref Range    TSH 0.48 0.30 - 4.20 uIU/mL   Glucose by meter     Status: Abnormal   Result Value Ref Range    GLUCOSE BY METER POCT 104 (H) 70 - 99 mg/dL   EKG 12 lead     Status: None   Result Value Ref Range    Systolic Blood Pressure  mmHg    Diastolic Blood  Pressure  mmHg    Ventricular Rate 92 BPM    Atrial Rate  BPM    MS Interval  ms    QRS Duration 112 ms     ms    QTc 467 ms    P Axis  degrees    R AXIS 26 degrees    T Axis 8 degrees    Interpretation ECG       Atrial fibrillation  Incomplete right bundle branch block  Abnormal ECG  Unconfirmed report - interpretation of this ECG is computer generated - see medical record for final interpretation  Confirmed by - EMERGENCY ROOM, PHYSICIAN ([id number]),  ERNESTO WHITAKER ([id number]) on 6/5/2025 12:24:11 PM     CBC with platelets differential     Status: None    Narrative    The following orders were created for panel order CBC with platelets differential.  Procedure                               Abnormality         Status                     ---------                               -----------         ------                     CBC with platelets and ...[[id number]]                      Final result                 Please view results for these tests on the individual orders.     Medications   sodium chloride 0.9% BOLUS 1,000 mL (1,000 mLs Intravenous $New Bag 6/5/25 8891)   Warfarin Dose Required Daily - Pharmacist Managed (has no administration in time range)   lidocaine 1 % 0.1-1 mL (has no administration in time range)   lidocaine (LMX4) cream (has no administration in time range)   sodium chloride (PF) 0.9% PF flush 3 mL (has no administration in time range)   sodium chloride (PF) 0.9% PF flush 3 mL (has no administration in time range)   senna-docusate (SENOKOT-S/PERICOLACE) 8.6-50 MG per tablet 1 tablet (has no administration in time range)     Or   senna-docusate (SENOKOT-S/PERICOLACE) 8.6-50 MG per tablet 2 tablet (has no administration in time range)   calcium carbonate (TUMS) chewable tablet 1,000 mg (has no administration in time range)   glucose gel 15-30 g (has no administration in time range)     Or   dextrose 50 % injection 25-50 mL (has no administration in time range)     Or   glucagon injection 1  mg (has no administration in time range)   Patient is already receiving anticoagulation with heparin, enoxaparin (LOVENOX), warfarin (COUMADIN)  or other anticoagulant medication (has no administration in time range)   acetaminophen (TYLENOL) tablet 650 mg (has no administration in time range)     Or   acetaminophen (TYLENOL) Suppository 650 mg (has no administration in time range)   Lidocaine (LIDOCARE) 4 % Patch 2 patch (has no administration in time range)   melatonin tablet 1 mg (has no administration in time range)   senna-docusate (SENOKOT-S/PERICOLACE) 8.6-50 MG per tablet 1 tablet (has no administration in time range)     Or   senna-docusate (SENOKOT-S/PERICOLACE) 8.6-50 MG per tablet 2 tablet (has no administration in time range)   polyethylene glycol (MIRALAX) Packet 17 g (has no administration in time range)   ondansetron (ZOFRAN ODT) ODT tab 4 mg (has no administration in time range)     Or   ondansetron (ZOFRAN) injection 4 mg (has no administration in time range)   insulin aspart (NovoLOG) injection (RAPID ACTING) (has no administration in time range)   insulin aspart (NovoLOG) injection (RAPID ACTING) (has no administration in time range)   aspirin (ASA) chewable tablet 81 mg (has no administration in time range)   atorvastatin (LIPITOR) tablet 40 mg (has no administration in time range)   chlorthalidone (HYGROTON) tablet 25 mg ( Oral Automatically Held 6/9/25 0800)   levothyroxine (SYNTHROID/LEVOTHROID) tablet 175 mcg (has no administration in time range)   magnesium oxide (MAG-OX) tablet 400 mg (has no administration in time range)   potassium chloride chen ER (KLOR-CON M10) CR tablet 40 mEq ( Oral Automatically Held 6/8/25 2000)   spironolactone (ALDACTONE) tablet 50 mg (has no administration in time range)   Vitamin D (Cholecalciferol) CAPS 1 capsule (has no administration in time range)   torsemide (DEMADEX) tablet 100 mg (has no administration in time range)   iopamidol (ISOVUE-370) solution 135  mL (135 mLs Intravenous $Given 6/5/25 1406)   sodium chloride (PF) 0.9% PF flush 84 mL (84 mLs Intravenous $Given 6/5/25 1409)   warfarin ANTICOAGULANT (COUMADIN/JANTOVEN) half-tab 9 mg (9 mg Oral $Given 6/5/25 1848)     Labs Ordered and Resulted from Time of ED Arrival to Time of ED Departure   COMPREHENSIVE METABOLIC PANEL - Abnormal       Result Value    Sodium 126 (*)     Potassium 4.2      Carbon Dioxide (CO2) 25      Anion Gap 16 (*)     Urea Nitrogen 40.5 (*)     Creatinine 1.42 (*)     GFR Estimate 52 (*)     Calcium 9.8      Chloride 85 (*)     Glucose 122 (*)     Alkaline Phosphatase 104      AST        ALT        Protein Total 7.7      Albumin 3.9      Bilirubin Total 0.6     TROPONIN T, HIGH SENSITIVITY - Abnormal    Troponin T, High Sensitivity 33 (*)    ROUTINE UA WITH MICROSCOPIC REFLEX TO CULTURE - Abnormal    Color Urine Yellow      Appearance Urine Slightly Cloudy (*)     Glucose Urine Negative      Bilirubin Urine Negative      Ketones Urine Trace (*)     Specific Gravity Urine 1.017      Blood Urine Negative      pH Urine 6.0      Protein Albumin Urine Negative      Urobilinogen Urine Normal      Nitrite Urine Negative      Leukocyte Esterase Urine Negative      Mucus Urine Present (*)     RBC Urine <1      WBC Urine 1      Squamous Epithelials Urine <1     INR - Abnormal    INR 2.90 (*)     PT 30.2 (*)    MAGNESIUM - Abnormal    Magnesium 2.6 (*)    TROPONIN T, HIGH SENSITIVITY - Abnormal    Troponin T, High Sensitivity 32 (*)    GLUCOSE BY METER - Abnormal    GLUCOSE BY METER POCT 104 (*)    INFLUENZA A/B, RSV AND SARS-COV2 PCR - Normal    Influenza A PCR Negative      Influenza B PCR Negative      RSV PCR Negative      SARS CoV2 PCR Negative     AST - Normal    AST 45     ALT - Normal    ALT 32     POTASSIUM - Normal    Potassium 3.9     UREA NITROGEN RANDOM URINE - Normal    Urea Nitrogen Urine mg/dL 905.0     NT-PROBNP - Normal    NT-proBNP 498     TSH WITH FREE T4 REFLEX - Normal    TSH  0.48     CBC WITH PLATELETS AND DIFFERENTIAL    WBC Count 8.7      RBC Count 5.36      Hemoglobin 14.3      Hematocrit 43.0      MCV 80      MCH 26.7      MCHC 33.3      RDW 13.7      Platelet Count 324      % Neutrophils 72      % Lymphocytes 13      % Monocytes 14      % Eosinophils 0      % Basophils 0      % Immature Granulocytes 1      NRBCs per 100 WBC 0      Absolute Neutrophils 6.3      Absolute Lymphocytes 1.1      Absolute Monocytes 1.2      Absolute Eosinophils 0.0      Absolute Basophils 0.0      Absolute Immature Granulocytes 0.1      Absolute NRBCs 0.0     CREATININE RANDOM URINE    Creatinine Urine mg/dL 108.0     SODIUM RANDOM URINE    Sodium Urine mmol/L <20     HEMOGLOBIN A1C   GLUCOSE MONITOR NURSING POCT   GLUCOSE MONITOR NURSING POCT   VITAMIN B12   FOLATE     CT Lumbar Spine Reconstructed   Final Result   Impression:   1.  No acute fracture or traumatic dislocation of the lumbar spine.   2.  Multilevel lumbar degenerative changes, most pronounced at L3-4   and L4-5 with moderate and severe neural foraminal stenosis,   respectively. No high-grade spinal canal stenosis.       I have personally reviewed the examination and initial interpretation   and I agree with the findings.      TARAN AMADO MD            SYSTEM ID:  [id number]      CT Abdomen Pelvis w Contrast   Final Result   IMPRESSION:    1. No acute abnormality within the abdomen or pelvis.   2. Cholelithiasis without cholecystitis.   3. Nonobstructing lower pole renal stones measure up to 3 mm.      I have personally reviewed the examination and initial interpretation   and I agree with the findings.      AILYN RAMIREZ MD            SYSTEM ID:  [id number]      Head CT w/o contrast   Final Result   IMPRESSION:    1. No acute intracranial pathology.   2. Unchanged chronic infarcts within the bilateral occipital lobes,   left greater than right       I have personally reviewed the examination and initial interpretation   and I agree  with the findings.      TARAN AMADO MD            SYSTEM ID:  [id number]      Chest XR,  PA & LAT   Final Result   IMPRESSION:    No acute focal airspace disease.      I have personally reviewed the examination and initial interpretation   and I agree with the findings.      SOL CRAIG MD            SYSTEM ID:  [id number]             Assessment & Plan    This is a medically complex 75-year-old male present with concerns for 1 week of generalized weakness as well as several falls, unwitnessed at home in the setting of anticoagulation.  In presentation he has reassuring vital signs, he is normotensive, without hypoxia is mildly tachycardic to 104.  On exam he has focal tenderness palpation of the left lower quadrant, has no obvious focal neurologic deficits and does have clear lungs.  Consider broad differential including infectious etiology, electrolyte disturbance anemia hypovolemia possible spontaneous head bleed, acute intra-abdominal process such as diverticulitis bowel obstruction UTI nephrolithiasis amongst others.  Plan for routine labs CBC chemistry troponin EKG will initiate neuroimaging head CT Noncon chest x-ray and CT abdomen pelvis with contrast to further evaluate.    Initial EKG was obtained and shows atrial fibrillation consistent with patient's known history.  Laboratory studies reviewed.  CBC without leukocytosis or anemia.  Chemistry shows MICHELLE creatinine 1.42 baseline around 1.  Patient also hyponatremic to 126.  Serial troponins stable.  INR 2.9, therapeutic.  Respiratory swab was obtained and is negative.  Chest x-ray clear.  CT head negative.  CT abdomen pelvis returned showing without acute abnormalities does show cholelithiasis without Uma cystitis, nonobstructing lower pole renal stone.  With the profound weakness noted on exam in the setting of hyponatremia anticoagulation status and multiple falls I do feel patient would benefit with admission with plan for PT OT consult.  Patient be  made to the medicine service for further management.  Case discussed with the hospitalist.    Patient seen and discussed with attending physician , who agrees with my plan of care.    I have reviewed the nursing notes. I have reviewed the findings, diagnosis, plan and need for follow up with the patient.    New Prescriptions    No medications on file       Final diagnoses:   Generalized weakness   Falls frequently   Anticoagulated   Acute kidney injury   Hyponatremia       LUAN Acuña  [facility] EMERGENCY DEPARTMENT  6/5/2025     Rebecca Phelan, LARRY  06/05/25 1949

## 2025-06-05 NOTE — ED TRIAGE NOTES
Patient presents via EMS with complaint of generalized weakness over the past few weeks.  Denies pain but states that he lives alone and has been weak and unable to get around.  States incont of urine stool because he is unable to get to the restroom.  Vitals stable.     Triage Assessment (Adult)       Row Name 06/05/25 1001          Triage Assessment    Airway WDL WDL        Respiratory WDL    Respiratory WDL WDL        Skin Circulation/Temperature WDL    Skin Circulation/Temperature WDL WDL        Cardiac WDL    Cardiac WDL X     Cardiac Rhythm Atrial fibrillation        Peripheral/Neurovascular WDL    Peripheral Neurovascular WDL WDL        Cognitive/Neuro/Behavioral WDL    Cognitive/Neuro/Behavioral WDL WDL                     
visit in 6 months, sooner as needed. Stop smoking when ready.

## 2025-06-05 NOTE — H&P
[facility]    History and Physical - Hospitalist Service, GOLD TEAM        Date of Admission:  6/5/2025    Assessment & Plan      Clarke Moreira is a 75 year old male admitted on 6/5/2025. He has PMH of AF on warfarin, hypothyroidism, T2DM, obesity, MDD, HTN, HLD, HFpEF who presented due to 2 weeks of worsening weakness and recent GLF. Will be admitted to medicine for further management.    Generalized weakness  Recurrent Falls  Peripheral Neuropathy  Has multiple previous admissions for similar situations (in 10/2024 and 12/2024). After those admissions, he went to TCU and with PT/OT, had improvement in strength. Unfortunately hasn't been able to have follow up with outpatient therapies since. He reports over the last two weeks having increased weakness and episodes where he legs give out while he is standing. He had a fall ~5/31 during which he lowered himself to his buttocks. Denies head or other injury. Also fell 6/1 more forcefully from standing with and has since had bilateral pelvic pain. Previously had been able to ambulate with a cane/walker but since these falls, he has hardly been out of bed due to feeling weak. Reports only having two true meals over the last week, otherwise only small amounts of PO intake. Imaging on admit reassuring with no acute fracture seen on CT Lumbar spine, CTH and CT A/P without acute findings. Labs remarkable for Na 126, Cr 1.42, AG 16, stable Hgb, UA noninfectious, CXR negative. . Troponin mildly elevated at 33 -> 32.   -- Suspect falls are likely from combination of deconditioning and muscle weakness, BLE edema (which is currently well managed), and diabetic neuropathy.   -- Will check B12/Folate  -- PT/OT  -- SW to help with dispo planning    MICHELLE, prerenal  Hyponatremia, mild  Cr on admit 1.42, baseline appears to be around 1.0. Sodium 126 with baseline ~135. Patient reports decreased PO intake 2/2 to weakness and  "limited mobility. FeUrea indicative of prerenal cause of MICHELLE. He was given 1L NS in the ED. Low suspicion that this is contributing to his falls over the last several months given previously normal sodiums.   -- Repeat BMP at 2100   -- Holding select PTA meds as below  -- Avoid nephrotoxic drugs as able  -- Daily BMP  -- No current fluid restriction    Abdominal pain, left lateral  Patient notes some left sided abdominal pain over the last 10 days, feels like he is constipated. Did have a small BM earlier today 6/5. Notes worse constipation related to his limited mobility and being in bed more often. CT A/P on admit with cholelithiasis without cholecystitis, BL renal stones that are in the lower poles, nonobstructive. Per my review, appears to also have moderate-large colonic stool burden without signs of obstruction. Some mild TTP on exam, no acute abdomen.  -- Daily Miralax  -- Senakot BID  -- Monitor symptoms for improvement; if worsening or become more colicky in nature, may be related to known cholelithiasis though currently low suspicion    HFpEF without current exacerbation  Chronic AF on Warfarin  HTN  HLD  Chronic BLE Edema, stable  Most recent Echo from 2024 with EF 55-60%, overall difficult study though biventricular function appeared normal. He was seen in in HFpEF clinic on 5/7/25 where edema and weakness was discussed. His BB was discontinued and he was started on Chlorthalidone. Semaglutide, SGLT2i, and Diltiazem were possible options going forward (patient previously declined starting SGLT2i in 3/2024 due to feeling well). On admit, troponin mildly elevated at 33 -> 32,  (improved from 1044 on 5/7/25). EKG with rate controlled AF. BLE edema currently better than it had been per patient, improved since cardiology adjusted medications. Reports some \"labored\" breathing more so when he lays flat, sounding consistent with orthopnea; he sleeps with HOB at 30 degrees at baseline. No current concern " for exacerbation, overall dry on exam. Weight 395 on 5/7, no update as of yet this admission.  -- HOLD PTA Chlorthalidone 25mg daily in setting of acute MICHELLE  -- HOLD PTA Potassium 40meq BID in the setting of acute MICHELLE given risk for hyperkalemia  -- Continue PTA Torsemide 100mg  -- Continue PTA Spironolactone 50mg daily   -- Continue PTA ASA 81 daily  -- Continue PTA Atorvastatin 40mg daily  -- Pharmacy to dose warfarin  -- PT/OT  -- Compression stockings     T2DM  Peripheral Neuropathy  A1C on 3/26/25 6.2, stable compared to previous years trends. Home regimen includes Metformin 500mg BID. Was prescribed Ozempic but has yet to start it, wanting to wait until follow up appt with PCP in June. Patient has noted longstanding BLE neuropathy (numbness and pain to light touch) that he feels is getting worse and may be contributing to recent falls. He follows with podiatry and was seen on 5/21/25 with no active ulcerations and sensations diminished bilaterally. Planning to see again in 12 weeks.  -- HOLD PTA Metformin  -- MSSI  -- Hypoglycemia protocol    Hypothyroidism  TSH on admit 0.48. WNL in 10/2024. Has been on 175mcg Synthroid daily.   -- Continue PTA Synthroid 175mcg daily     MDD  Follows with therapist outpatient. No chronic medications  -- Noted          Diet: Combination Diet Low Saturated Fat Na <2400mg Diet  DVT Prophylaxis: Warfarin  Bazzi Catheter: Not present  Lines: None     Cardiac Monitoring: None  Code Status: No CPR- Do NOT Intubate    Clinically Significant Risk Factors Present on Admission         # Hyponatremia: Lowest Na = 126 mmol/L in last 2 days, will monitor as appropriate  # Hypochloremia: Lowest Cl = 85 mmol/L in last 2 days, will monitor as appropriate         # Drug Induced Coagulation Defect: home medication list includes an anticoagulant medication  # Drug Induced Platelet Defect: home medication list includes an antiplatelet medication   # Hypertension: Noted on problem list  # Chronic  "heart failure with preserved ejection fraction: heart failure noted on problem list and last echo with EF >50%          # Morbid Obesity: Estimated body mass index is 55.22 kg/m  as calculated from the following:    Height as of 5/8/25: 1.803 m (5' 11\").    Weight as of 5/8/25: 179.6 kg (395 lb 14.4 oz).         # Financial/Environmental Concerns:           Disposition Plan     Medically Ready for Discharge: Anticipated in 2-4 Days         The patient's care was discussed with the Attending Physician, Dr. Briseno, Bedside Nurse, and Patient.    Lucy Maddox PA-C  Hospitalist Service, [employer]  Securely message with [employer] (more info)  Text page via [facility] Paging/Directory   See signed in provider for up to date coverage information    ______________________________________________________________________    Chief Complaint   Weakness, fall    History is obtained from the patient    History of Present Illness   Clarke Moreira is a 75 year old male admitted on 6/5/2025. He has PMH of AF on warfarin, hypothyroidism, T2DM, obesity, MDD, HTN, HLD, HFpEF who presented due to 2 weeks of worsening weakness and recent GLF. Will be admitted to medicine for further management.    Patient reports about 2 weeks of increased weakness associated with 2 falls where his \"legs gave out \".  Has not gotten out of bed much secondary to weakness, and has had decreased p.o. intake with only 2 complete meals over the last week or so.  Reports baseline BLE numbness and sensitivity as well as edema, though edema has improved since his cardiologist adjusted medications a month ago.  Confirms orthopnea and sleeps with head of bed at 30 degrees nightly.  Also reporting abdominal pain over the last week or so which she believes is secondary to constipation from decreased activity/laying in bed more.  No nausea/vomiting, chest pain, headache, lightheadedness.      Past Medical " History    Past Medical History:   Diagnosis Date    Atrial fibrillation (H)     Basal cell carcinoma     Chronic anticoagulation     Diastolic heart failure (H)     Dyslipidemia     Essential hypertension     Morbid obesity (H)     Sleep-disordered breathing     Type 2 diabetes mellitus (H)        Past Surgical History   Past Surgical History:   Procedure Laterality Date    BIOPSY OF SKIN LESION      MOHS MICROGRAPHIC PROCEDURE      PICC INSERTION Right 09/24/2017    5fr TL Bard PICC, 51cm (1cm external) in the R medial brachial vein w/ tip in the SVC.       Prior to Admission Medications   Prior to Admission Medications   Prescriptions Last Dose Informant Patient Reported? Taking?   CERTAVITE/ANTIOXIDANTS tablet 6/5/2025 Morning  No Yes   Sig: TAKE ONE TABLET BY MOUTH ONE TIME DAILY   FEROSUL 325 (65 Fe) MG tablet 6/5/2025 Morning  No Yes   Sig: Take 1 tablet (325 mg) by mouth every other day. For iron deficiency anemia   Omega-3 Fatty Acids (EQL FISH OIL) 1000 MG CAPS 6/4/2025 Evening  No Yes   Sig: Take 1 capsule by mouth daily.   Vitamin D, Cholecalciferol, 25 MCG (1000 UT) CAPS 6/5/2025 Morning  Yes Yes   Sig: Take 1 capsule by mouth daily.   aspirin (ASA) 81 MG chewable tablet 6/4/2025 Evening  No Yes   Sig: Take 1 tablet (81 mg) by mouth daily.   atorvastatin (LIPITOR) 40 MG tablet 6/4/2025 Evening  No Yes   Sig: Take 1 tablet (40 mg) by mouth daily   chlorthalidone (HYGROTON) 25 MG tablet 6/5/2025 Morning  No Yes   Sig: Take 1 tablet (25 mg) by mouth daily.   hypromellose (ARTIFICIAL TEARS) 0.5 % SOLN ophthalmic solution   Yes No   Sig: Place 1 drop into both eyes 4 times daily as needed for dry eyes.   levothyroxine (SYNTHROID/LEVOTHROID) 175 MCG tablet 6/5/2025 Morning  No Yes   Sig: Take 1 tablet (175 mcg) by mouth every morning (before breakfast)   magnesium oxide (MAG-OX) 400 MG tablet 6/4/2025 at  6:00 PM  No Yes   Sig: Take 1 tablet (400 mg) by mouth daily.   metFORMIN (GLUCOPHAGE) 500 MG tablet  6/5/2025 Morning  No Yes   Sig: Take 1 tablet (500 mg) by mouth 2 times daily (with meals).   potassium chloride chen ER (KLOR-CON M20) 20 MEQ CR tablet 6/5/2025 Morning  No Yes   Sig: Take 2 tablets (40 mEq) by mouth 2 times daily.   senna-docusate (SENEXON-S) 8.6-50 MG tablet Unknown  No Yes   Sig: Take 1 to 2 tablets by mouth 2 times daily as needed for constipation   spironolactone (ALDACTONE) 50 MG tablet 6/5/2025 Morning  No Yes   Sig: Take 1 tablet (50 mg) by mouth daily.   torsemide (DEMADEX) 20 MG tablet 6/4/2025 Noon  No Yes   Sig: Take 4 tablets (80 mg) by mouth daily as needed (weight gain, swelling, shortness of breath).   urea (CARMOL) 10 % external lotion Unknown  No Yes   Sig: Apply topically 2 times daily as needed for dry skin.   vitamin C (ASCORBIC ACID) 1000 MG TABS 6/5/2025 Morning  Yes Yes   Sig: Take 1,000 mg by mouth daily   warfarin ANTICOAGULANT (COUMADIN) 3 MG tablet 6/4/2025 Evening  No Yes   Sig: TAKE THREE TABLETS BY MOUTH AT BEDTIME      Facility-Administered Medications: None        Review of Systems    The 10 point Review of Systems is negative other than noted in the HPI or here.       Physical Exam   Vital Signs: Temp: 98.1  F (36.7  C) Temp src: Oral BP: (!) 124/109 Pulse: 104   Resp: 18 SpO2: 98 % O2 Device: None (Room air)    Weight: 0 lbs 0 oz    Physical Exam  Vitals reviewed.   Constitutional:       General: He is not in acute distress.     Appearance: He is not diaphoretic.   Cardiovascular:      Rate and Rhythm: Normal rate. Rhythm irregular.      Heart sounds: No murmur heard.  Pulmonary:      Effort: Pulmonary effort is normal.      Breath sounds: No wheezing, rhonchi or rales.   Abdominal:      General: Bowel sounds are normal. There is no distension.      Palpations: Abdomen is soft.      Tenderness: There is abdominal tenderness (mild TTP, left lateral abdomen and right lateral abdomen). There is no guarding.   Musculoskeletal:      Right lower leg: Edema (1+ to mid  calf) present.      Left lower leg: Edema (1+ to mid calf) present.   Skin:     General: Skin is warm and dry.      Findings: Bruising (right forearm and hand) present.   Neurological:      General: No focal deficit present.      Mental Status: He is alert and oriented to person, place, and time.      Comments: Decreased sensation to BL feet     Psychiatric:         Mood and Affect: Mood normal.           Medical Decision Making       145 MINUTES SPENT BY ME on the date of service doing chart review, history, exam, documentation & further activities per the note.      Data     I have personally reviewed the following data over the past 24 hrs:    8.7  \   14.3   / 324     126 (L) 85 (L) 40.5 (H) /  104 (H)   3.9 25 1.42 (H) \     ALT: 32 AST: 45 AP: 104 TBILI: 0.6   ALB: 3.9 TOT PROTEIN: 7.7 LIPASE: N/A     Trop: 32 (H) BNP: 498     TSH: 0.48 T4: N/A A1C: N/A     INR:  2.90 (H) PTT:  N/A   D-dimer:  N/A Fibrinogen:  N/A       Imaging results reviewed over the past 24 hrs:   Recent Results (from the past 24 hours)   Chest XR,  PA & LAT    Narrative    EXAM: XR CHEST 2 VIEWS  6/5/2025 11:56 AM     HISTORY:  Generalized weakness       COMPARISON:  Chest radiograph 3/6/2019, CT chest 9/22/2024    FINDINGS:   Trachea is midline. Cardiac silhouette is within normal limits.  Calcifications of the aortic knob. No focal consolidative opacity. No  pleural effusion. No pneumothorax.    No acute osseous abnormality. Visualized soft tissues and upper  abdomen are unremarkable.         Impression    IMPRESSION:   No acute focal airspace disease.    I have personally reviewed the examination and initial interpretation  and I agree with the findings.    SOL CRAIG MD         SYSTEM ID:  [id number]   Head CT w/o contrast    Narrative    EXAM: CT HEAD W/O CONTRAST  6/5/2025 2:30 PM     HISTORY:  Headache, anticoagulated evaluate spontaneous bleed       COMPARISON:  CT 12/7/2024    TECHNIQUE: Using multidetector thin collimation  helical acquisition  technique, axial, coronal and sagittal CT images from the skull base  to the vertex were obtained without intravenous contrast.   (topogram) image(s) also obtained and reviewed.    FINDINGS:  No acute intracranial hemorrhage, mass effect, or midline shift. No  acute loss of gray-white matter differentiation in the cerebral  hemispheres. 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 Ventricles are proportionate to the cerebral  sulci. Clear basal cisterns.    The bony calvaria and the bones of the skull base are normal. The  visualized portions of the paranasal sinuses and mastoid air cells are  clear. Grossly normal orbits.       Impression    IMPRESSION:   1. No acute intracranial pathology.  2. Unchanged chronic infarcts within the bilateral occipital lobes,  left greater than right     I have personally reviewed the examination and initial interpretation  and I agree with the findings.    TARAN AMADO MD         SYSTEM ID:  [id number]   CT Abdomen Pelvis w Contrast    Narrative    EXAMINATION: CT ABDOMEN PELVIS W CONTRAST, 6/5/2025 2:37 PM    INDICATION: Left lower quadrant pain, generalized weakness, fall 4  days ago with bilateral hip pain, further evaluate    COMPARISON STUDY: 9/22/2024    TECHNIQUE: CT scan of the abdomen and pelvis was performed on  multidetector CT scanner using volumetric acquisition technique and  images were reconstructed in multiple planes with variable thickness  and reviewed on dedicated workstations.     CONTRAST: 135cc of isovue 370 injected IV without oral contrast    CT scan radiation dose is optimized to minimum requisite dose using  automated dose modulation techniques.    FINDINGS:    Lower thorax: Normal.    Liver: No mass. No intrahepatic biliary ductal dilation.    Biliary System: Cholelithiasis without cholecystitis.    Pancreas: No mass or pancreatic ductal dilation.    Adrenal glands: No mass or  nodules    Spleen: Normal.    Kidneys: No suspicious mass, obstructing calculus or hydronephrosis.   Nonobstructing stone in the lower pole of the left kidney measuring 3  mm with second stone measuring 2 mm in the lower pole more  posteriorly. 2 mm right lower pole nonobstructing renal stone.    Gastrointestinal tract :Normal appendix. Normal caliber small bowel.    Mesentery/peritoneum/retroperitoneum: Small fat-containing umbilical  hernia. There No mass. No free fluid or air.    Lymph nodes: No significant lymphadenopathy.    Vasculature: Patent major abdominal vasculature.    Pelvis: Urinary bladder is normal.  1  Osseous structures: No aggressive or acute osseous lesion.      Soft tissues: Within normal limits.       Impression    IMPRESSION:   1. No acute abnormality within the abdomen or pelvis.  2. Cholelithiasis without cholecystitis.  3. Nonobstructing lower pole renal stones measure up to 3 mm.    I have personally reviewed the examination and initial interpretation  and I agree with the findings.    AILYN RAMIREZ MD         SYSTEM ID:  [id number]   CT Lumbar Spine Reconstructed    Narrative    CT LUMBAR SPINE RECONSTRUCTED 6/5/2025 2:38 PM    History: Fall, pain, evaluate fracture.    Comparison: Lumbar spine CT 10/25/2024.    Technique: Reconstructed axial, coronal and sagittal CT images through  the lumbar spine were obtained from same day CT abdomen.     Findings:   Limited imaging. There are 5 lumbar type vertebrae. Regarding  alignment, mild levocurvature of the lumbar spine. There is moderate  to severe disc space narrowing at L4-5, otherwise preserved disc  spaces. Mild to moderate spondylosis of the lumbar spine with endplate  spurring, osteophytic bridging and facet arthropathy.     Please see same day CT abdomen for intra-abdominal and thoracic  findings.    Findings on a level by level basis are as follows:    L1-L2: No spinal canal or neuroforaminal stenosis.    L2-L3: No spinal canal or  neuroforaminal stenosis.    L3-L4: Bilateral moderate neural foraminal stenosis. No spinal canal  stenosis.    L4-L5: Severe bilateral neural foraminal stenosis. No spinal canal  stenosis with    L5-S1: No spinal canal or neuroforaminal stenosis.      Impression    Impression:  1.  No acute fracture or traumatic dislocation of the lumbar spine.  2.  Multilevel lumbar degenerative changes, most pronounced at L3-4  and L4-5 with moderate and severe neural foraminal stenosis,  respectively. No high-grade spinal canal stenosis.     I have personally reviewed the examination and initial interpretation  and I agree with the findings.    TARAN AMADO MD         SYSTEM ID:  [id number]

## 2025-06-05 NOTE — MR AVS SNAPSHOT
After Visit Summary   8/14/2017    Clarke Moreira    MRN: [id number]           Patient Information     Date Of Birth          1950        Visit Information        Provider Department      8/14/2017 1:00 PM Katharine Mcmullen APRN CNP [facility]        Today's Diagnoses     Type 2 diabetes mellitus with complication, without long-term current use of insulin (H)    -  1    Hypothyroidism, unspecified type        Essential hypertension        Lichen of skin        Onychomycosis          Care Instructions    Here is the plan from today's visit    Results for orders placed or performed in visit on 08/14/17   Hemoglobin A1c ([facility])   Result Value Ref Range    Hemoglobin A1C 6.5 (H) 4.1 - 5.7 %   Comprehensive Metabolic Panel ([facility])   Result Value Ref Range    Albumin 4.0 3.5 - 4.7 mg/dL    Alkaline Phosphatase 84.4 31.7 - 110.7 U/L    ALT 15.6 0.0 - 45.0 U/L    AST 16.9 0.0 - 55.0 U/L    Bilirubin Total 1.2 0.2 - 1.3 mg/dL    Urea Nitrogen 6.7 (L) 7.0 - 21.0 mg/dL    Calcium 9.8 8.5 - 10.1 mg/dL    Chloride 94.7 (L) 98.0 - 110.0 mmol/L    Carbon Dioxide 27.3 20.0 - 32.0 mmol/L    Creatinine 0.9 0.7 - 1.3 mg/dL    Glucose 140.3 (H) 70.0 - 99.0 mg'dL    Potassium 4.0 3.3 - 4.5 mmol/dL    Sodium 135.8 132.6 - 141.4 mmol/L    Protein Total 7.3 6.8 - 8.8 g/dL    GFR Estimate >90 >60.0 mL/min/1.7 m2    GFR Estimate If Black >90 >60.0 mL/min/1.7 m2   Lipid Cascade ([facility])   Result Value Ref Range    Cholesterol 94.3 0.0 - 200.0 mg/dL    Cholesterol/HDL Ratio 2.8 0.0 - 5.0    HDL Cholesterol 33.7 (L) >40.0 mg/dL    LDL Cholesterol Calculated 46 0 - 129 mg/dL    Triglycerides 71.5 0.0 - 150.0 mg/dL    VLDL Cholesterol 14.3 7.0 - 32.0 mg/dL       1. Type 2 diabetes mellitus with complication, without long-term current use of insulin (H)  - Hemoglobin A1c ([facility])  - Comprehensive Metabolic Panel ([facility])  - Lipid Cascade ([facility])  - Albumin Random Urine Quantitative;  H&P - Hospitalist   Name: Adam Gonzalez 52 y.o. male I MRN: [id number]  Unit/Bed#: FT 01 I Date of Admission: 6/4/2025   Date of Service: 6/4/2025 I Hospital Day: 0     Assessment & Plan  Shortness of breath  Patient presents ED with complaints of dyspnea which started at 1 PM while lying in bed. Per patient symptoms have improved throughout the day, but not unresolved.  Patient was recently here for similar symptoms on 5/18/2025.  When patient was here on May 18, 2025, patient's troponins were elevated, new elevation; and BNP was in the 600s.  During that time his D-dimer slightly elevated at 1.01.  Per ED provider, did not repeat D-dimer today due to patient's renal function and unable to do a CTA scan.  Patient placed on a heparin drip, as preventative measure.  ED provider gave aspirin, calcium gluconate, and initiated heparin drip.  Current vital signs: /80, HR 83, , RR 20, temp 98.4 °F and oxygen saturation 99% on room air.  EKG-normal sinus rhythm.  Chest x-ray-pending.  Troponin 54, 44, pending third troponin.  Delta -10.  BNP-427.  Bilateral lower extremity venous duplex-pending results.    Plan  Telemetry ordered.  Monitor vital signs.  Echo ordered.  Respiratory protocol.  CBC, mag and CMP in AM.  Essential hypertension  Blood pressure and heart rate stable.  /80, HR 83.  Continue amlodipine, metoprolol and hydralazine.  Monitor BP and HR.  Type 2 diabetes mellitus with circulatory disorder, with long-term current use of insulin ([facility])    Lab Results   Component Value Date    HGBA1C 7.0 (H) 05/05/2025   Lantus dose decreased to 14 units daily, patient currently takes 28 units daily; may increase upon reevaluation of patient.  Sliding scale ordered for coverage.  Capillary glucose check and meals and at bedtime.  Monitor patient for signs and symptoms of hypo and hyperglycemia.    Acute kidney injury superimposed on stage 5 chronic kidney disease, not on chronic dialysis ([facility])  Lab  Future  - OPTHALMOLOGY ADULT REFERRAL - INTERNAL    2. Hypothyroidism, unspecified type  - TSH    3. Essential hypertension    4. Lichen of skin  - DERMATOLOGY REFERRAL - INTERNAL    5. Onychomycosis  - DERMATOLOGY REFERRAL - INTERNAL      Follow-up in 3-6 months, sooner as needed.       Thank you for coming to [facility] today.  Lab Testing:  **If you had lab testing today and your results are reassuring or normal they will be mailed to you or sent through [employer] within 7 days.   **If the lab tests need quick action we will call you with the results.  The phone number we will call with results is # [phone] (home) . If this is not the best number please call our clinic and change the number.  Medication Refills:  If you need any refills please call your pharmacy and they will contact us.   If you need to  your refill at a new pharmacy, please contact the new pharmacy directly. The new pharmacy will help you get your medications transferred faster.   Scheduling:  If you have any concerns about today's visit or wish to schedule another appointment please call our office during normal business hours [phone] (8-5:00 M-F)  If a referral was made to a [facility] and you don't get a call from central scheduling please call [phone].  If a Mammogram was ordered for you at [facility] call [phone] to schedule or change your appointment.  If you had an XRay/CT/Ultrasound/MRI ordered the number is [phone] to schedule or change your radiology appointment.   Medical Concerns:  If you have urgent medical concerns please call [phone] at any time of the day.  If you have a medical emergency please call [phone].            Follow-ups after your visit        Additional Services     DERMATOLOGY REFERRAL - INTERNAL       Your provider has referred you to: [facility]: Dermatology Clinic [facility] [phone]    Results   Component Value Date    EGFR 8 06/04/2025    EGFR 9 05/18/2025    EGFR 8 05/05/2025    CREATININE 6.77 (H) 06/04/2025    CREATININE 6.43 (H) 05/18/2025    CREATININE 6.52 (H) 05/05/2025   Patient has history of chronic kidney disease, has fistula placed about a year ago; but patient does not do dialysis treatments.  CBC, CMP and mag in AM.  Current smoker  Patient has been greater than 20-year pack a day smoker.  Per patient's last family medicine visit, patient is contemplating quitting and slowing down on the amount of cigarettes smoked during the day.  Encourage smoking cessation.  Nicotine patch ordered.  Electrolyte abnormality  Magnesium 1.1, replaced in the ED.  Calcium 5.9, given calcium gluconate in ED.  CMP and mag in AM.    VTE Pharmacologic Prophylaxis:   Moderate Risk (Score 3-4) - Pharmacological DVT Prophylaxis Ordered: heparin.  Code Status: Full Code  Discussion with family: Patient.     Anticipated Length of Stay: Patient will be admitted on an inpatient basis with an anticipated length of stay of greater than 2 midnights secondary to shortness of breath, concern for PE need further workup.    History of Present Illness   Chief Complaint: Shortness of breath    Adam Gonzalez is a 52 y.o. male with a PMH of chronic kidney disease, hypertension, diabetes mellitus, stroke and smoker who presents with complaints of dyspnea which started at 1 PM while lying in bed. Per patient symptoms have improved throughout the day, but not unresolved.  Patient was recently here for similar symptoms on 5/18/2025.  When patient was here on May 18, 2025, patient's troponins were elevated, new elevation; and BNP was in the 600s.  During that time his D-dimer slightly elevated at 1.01.  Per ED provider, did not repeat D-dimer today due to patient's renal function and unable to do a CTA scan.  Patient placed on a heparin drip, as preventative measure.    Review of Systems   Constitutional:  Negative for appetite  change, chills, fatigue and fever.   HENT:  Negative for congestion, ear pain, sore throat and trouble swallowing.    Eyes:  Negative for pain and visual disturbance.   Respiratory:  Positive for shortness of breath. Negative for cough.    Cardiovascular:  Negative for chest pain and palpitations.   Gastrointestinal:  Negative for abdominal pain, diarrhea, nausea and vomiting.   Genitourinary:  Negative for dysuria and hematuria.   Musculoskeletal:  Negative for arthralgias and back pain.   Skin:  Negative for color change and rash.   Neurological:  Negative for dizziness, seizures, syncope and light-headedness.   Psychiatric/Behavioral:  Negative for behavioral problems and confusion.    All other systems reviewed and are negative.      Historical Information   Past Medical History[1]  Past Surgical History[2]  Social History[3]  E-Cigarette/Vaping    E-Cigarette Use Never User      E-Cigarette/Vaping Substances    Nicotine No     THC No     CBD No     Flavoring No     Other No     Unknown No      Family History[4]  Social History:  Marital Status: Single   Occupation: N/A  Patient Pre-hospital Living Situation: Home  Patient Pre-hospital Level of Mobility: unable to be assessed at time of evaluation  Patient Pre-hospital Diet Restrictions: N/A    Meds/Allergies   I have reviewed home medications using recent Epic encounter.  Prior to Admission medications    Medication Sig Start Date End Date Taking? Authorizing Provider   amLODIPine (NORVASC) 10 mg tablet Take 1 tablet (10 mg total) by mouth daily 2/3/25   Evans Birmingham MD   atorvastatin (LIPITOR) 80 mg tablet Take 1 tablet (80 mg total) by mouth daily 2/3/25   Evans Birmingham MD   calcitriol (ROCALTROL) 0.5 MCG capsule Take 1 capsule (0.5 mcg total) by mouth daily Monday, Wednesday, Friday 8/14/24 5/11/25  YASMIN Gresham   calcium carbonate (TUMS) 500 mg chewable tablet Chew 4 tablets (2,000 mg total) daily 5/18/25   Philip  [email or web address]    Please be aware that coverage of these services is subject to the terms and limitations of your health insurance plan.  Call member services at your health plan with any benefit or coverage questions.      Please bring the following with you to your appointment:    (1) Any X-Rays, CTs or MRIs which have been performed.  Contact the facility where they were done to arrange for  prior to your scheduled appointment.    (2) List of current medications  (3) This referral request   (4) Any documents/labs given to you for this referral            OPTHALMOLOGY ADULT REFERRAL - INTERNAL       Your provider has referred you to: [facility]: Eye Clinic [facility] [phone]   [email or web address]    Please be aware that coverage of these services is subject to the terms and limitations of your health insurance plan.  Call member services at your health plan with any benefit or coverage questions.      Please bring the following with you to your appointment:    (1) Any X-Rays, CTs or MRIs which have been performed.  Contact the facility where they were done to arrange for  prior to your scheduled appointment.    (2) List of current medications  (3) This referral request   (4) Any documents/labs given to you for this referral                  Future tests that were ordered for you today     Open Future Orders        Priority Expected Expires Ordered    Albumin Random Urine Quantitative Routine  8/14/2018 8/14/2017            Who to contact     Please call your clinic at [phone] to:    Ask questions about your health    Make or cancel appointments    Discuss your medicines    Learn about your test results    Speak to your doctor   If you have compliments or concerns about an experience at your clinic, or if you wish to file a complaint, please contact [facility] Patient Relations at [phone] or email  RASHI Saini MD   clopidogrel (PLAVIX) 75 mg tablet Take 1 tablet (75 mg total) by mouth daily 2/3/25   Evans Birmingham MD   Dulaglutide 0.75 MG/0.5ML SOAJ Inject 0.75 mg under the skin once a week 2/3/25   Evans Birmingham MD   ergocalciferol (VITAMIN D2) 50,000 units Take 1 capsule (50,000 Units total) by mouth once a week 12/27/24   YASMIN Gresham   hydrALAZINE (APRESOLINE) 50 mg tablet Take 1 tablet (50 mg total) by mouth 2 (two) times a day 2/3/25   Evans Birmingham MD   Insulin Glargine Solostar (Basaglar KwikPen) 100 UNIT/ML SOPN Inject 0.28 mL (28 Units total) under the skin daily 2/3/25   Evans Birmingham MD   Insulin Glargine Solostar (Basaglar KwikPen) 100 UNIT/ML SOPN Inject 0.28 mL (28 Units total) under the skin in the morning 2/10/25   Evans Birmingham MD   metoprolol succinate (TOPROL-XL) 50 mg 24 hr tablet Take 1 tablet (50 mg total) by mouth daily 2/3/25   Evans Birmingham MD   sodium bicarbonate 650 mg tablet Take 1 tablet (650 mg total) by mouth 2 (two) times a day 8/14/24   YASMIN Gresham     No Known Allergies    Objective :  Temp:  [98.4 °F (36.9 °C)] 98.4 °F (36.9 °C)  HR:  [83-84] 83  BP: (141-147)/(80-83) 147/80  Resp:  [20-24] 20  SpO2:  [99 %-100 %] 99 %  O2 Device: None (Room air)    Physical Exam  Vitals and nursing note reviewed.   Constitutional:       General: He is not in acute distress.     Appearance: He is well-developed.   HENT:      Head: Normocephalic and atraumatic.      Nose: No congestion.     Eyes:      Conjunctiva/sclera: Conjunctivae normal.       Cardiovascular:      Rate and Rhythm: Normal rate and regular rhythm.      Heart sounds: No murmur heard.  Pulmonary:      Effort: Pulmonary effort is normal. No respiratory distress.      Breath sounds: Normal breath sounds.      Comments: Room air  Abdominal:      Palpations: Abdomen is soft.      Tenderness: There is no abdominal tenderness.  us at [email or web address]         Additional Information About Your Visit        [employer] Information     [employer] is an electronic gateway that provides easy, online access to your medical records. With [employer], you can request a clinic appointment, read your test results, renew a prescription or communicate with your care team.     To sign up for [employer] visit the website at [email or web address]   You will be asked to enter the access code listed below, as well as some personal information. Please follow the directions to create your username and password.     Your access code is: [email or web address]  Expires: 2017  2:53 PM     Your access code will  in 90 days. If you need help or a new code, please contact your [facility] or call [phone] for assistance.        Care EveryWhere ID     This is your Care EveryWhere ID. This could be used by other organizations to access your [place] medical records  [id number]        Your Vitals Were     Pulse Temperature Respirations Pulse Oximetry BMI (Body Mass Index)       92 98.2  F (36.8  C) (Oral) 18 96% 57.1 kg/m2        Blood Pressure from Last 3 Encounters:   17 137/73   16 (!) 147/93   14 138/79    Weight from Last 3 Encounters:   17 (!) 421 lb (191 kg)   16 (!) 413 lb (187.3 kg)   16 (!) 413 lb 3.2 oz (187.4 kg)              We Performed the Following     Comprehensive Metabolic Panel ([facility])     DERMATOLOGY REFERRAL - INTERNAL     Hemoglobin A1c ([facility])     Lipid [place] ([facility])     OPTHALMOLOGY ADULT REFERRAL - INTERNAL     TSH        Primary Care Provider Office Phone # Fax #    Matthias Sanchez [phone]       [address] [address]        Equal Access to Services     TYLER [place] : Felisa Cotton, waaxda luqadaha, qaybta kaalvasyl romo . So [facility]      Musculoskeletal:         General: No swelling.      Cervical back: Neck supple.     Skin:     General: Skin is warm and dry.      Capillary Refill: Capillary refill takes less than 2 seconds.     Neurological:      Mental Status: He is alert and oriented to person, place, and time.      GCS: GCS eye subscore is 4. GCS verbal subscore is 5. GCS motor subscore is 6.      Motor: No weakness.     Psychiatric:         Attention and Perception: Attention normal.         Mood and Affect: Mood normal.         Speech: Speech normal.         Behavior: Behavior is cooperative.         Cognition and Memory: Cognition normal.          Lines/Drains:      Lab Results: I have reviewed the following results:  Results from last 7 days   Lab Units 06/04/25  1853   WBC Thousand/uL 9.99   HEMOGLOBIN g/dL 11.2*   HEMATOCRIT % 35.2*   PLATELETS Thousands/uL 222   SEGS PCT % 80*   LYMPHO PCT % 10*   MONO PCT % 7   EOS PCT % 3     Results from last 7 days   Lab Units 06/04/25  1853   SODIUM mmol/L 137   POTASSIUM mmol/L 3.7   CHLORIDE mmol/L 108   CO2 mmol/L 18*   BUN mg/dL 70*   CREATININE mg/dL 6.77*   ANION GAP mmol/L 11   CALCIUM mg/dL 5.9*   ALBUMIN g/dL 3.6   TOTAL BILIRUBIN mg/dL 0.33   ALK PHOS U/L 153*   ALT U/L 17   AST U/L 18   GLUCOSE RANDOM mg/dL 181*             Lab Results   Component Value Date    HGBA1C 7.0 (H) 05/05/2025    HGBA1C 7.2 (A) 02/03/2025    HGBA1C 6.8 (A) 09/23/2024           Imaging Results Review: I reviewed radiology reports from this admission including: Venous duplex bilateral lower extremities and chest xray.  Other Study Results Review: EKG was reviewed.     Administrative Statements   I have spent a total time of 60 minutes in caring for this patient on the day of the visit/encounter including Diagnostic results, Patient and family education, Impressions, Documenting in the medical record, Reviewing/placing orders in the medical record (including tests, medications, and/or procedures), Obtaining or  [phone].    ATENCIÓN: Si yolanda leal, tiene a kim disposición servicios gratuitos de asistencia lingüística. Alexis pablo [phone].    We comply with applicable federal civil rights laws and [place] laws. We do not discriminate on the basis of race, color, national origin, age, disability sex, sexual orientation or gender identity.            Thank you!     Thank you for choosing [facility]  for your care. Our goal is always to provide you with excellent care. Hearing back from our patients is one way we can continue to improve our services. Please take a few minutes to complete the written survey that you may receive in the mail after your visit with us. Thank you!             Your Updated Medication List - Protect others around you: Learn how to safely use, store and throw away your medicines at www.disposemymeds.org.          This list is accurate as of: 8/14/17  2:53 PM.  Always use your most recent med list.                   Brand Name Dispense Instructions for use Diagnosis    ammonium lactate 12 % cream    LAC-HYDRIN    385 g    Apply topically 2 times daily as needed for dry skin    Type 2 diabetes mellitus with diabetic polyneuropathy (H), Dry skin, Lichen of skin       aspirin 81 MG tablet     30 tablet    Take 1 tablet (81 mg) by mouth daily    Essential hypertension, hypertension with unspecified goal       atorvastatin 40 MG tablet    LIPITOR    30 tablet    Take 1 tablet (40 mg) by mouth daily    Type 2 diabetes mellitus without complication (H)       hydrochlorothiazide 25 MG tablet    HYDRODIURIL    30 tablet    Take 1 tablet (25 mg) by mouth daily    Essential hypertension, hypertension with unspecified goal       levothyroxine 125 MCG tablet    SYNTHROID/LEVOTHROID    30 tablet    Take 1 tablet (125 mcg) by mouth daily    Hypothyroidism, unspecified type       lisinopril 10 MG tablet    PRINIVIL/ZESTRIL    30 tablet    Take 1 tablet (10 mg) by mouth daily    Essential  hypertension with goal blood pressure less than 140/90       metFORMIN 500 MG tablet    GLUCOPHAGE    60 tablet    Take 1 tablet (500 mg) by mouth 2 times daily (with meals)    Type 2 diabetes mellitus without complication (H)       metoprolol 100 MG 24 hr tablet    TOPROL-XL    30 tablet    Take 1 tablet (100 mg) by mouth daily    Essential hypertension, hypertension with unspecified goal       Multi-vitamin Tabs tablet      Take 1 tablet by mouth daily        * OMEGA-3 FISH OIL PO      Take 1,000 mg by mouth daily        * omega 3 1000 MG Caps     90 capsule    Take 1 g by mouth daily    Hyperlipidemia LDL goal <100       * Notice:  This list has 2 medication(s) that are the same as other medications prescribed for you. Read the directions carefully, and ask your doctor or other care provider to review them with you.       reviewing history  , and Communicating with other healthcare professionals .    ** Please Note: This note has been constructed using a voice recognition system. **         [1]   Past Medical History:  Diagnosis Date    Chronic kidney disease     Diabetes mellitus (HCC)     Hypertension     Received intravenous tissue plasminogen activator (tPA) in emergency department 02/01/2022    Stroke (HCC)     no deficits   [2]   Past Surgical History:  Procedure Laterality Date    CARDIAC LOOP RECORDER  2023    PILONIDAL CYST EXCISION      MS ARTERIOVENOUS ANASTOMOSIS OPEN DIRECT Left 4/10/2024    Procedure: CREATION FISTULA BRACHIOCEPHALIC LEFT (AV);  Surgeon: Maxwell Nielsen MD;  Location: [facility];  Service: Vascular    TOE AMPUTATION Left 4/2/2025    Procedure: AMPUTATION TOE;  Surgeon: Adriane Anna DPM;  Location: [facility];  Service: Podiatry   [3]   Social History  Tobacco Use    Smoking status: Every Day     Current packs/day: 0.50     Average packs/day: 1 pack/day for 38.0 years (36.8 ttl pk-yrs)     Types: Cigarettes     Start date: 6/16/1987     Passive exposure: Current    Smokeless tobacco: Never    Tobacco comments:     states slowing it down, used Chantix-currently 1/2 pack/day, at least 35 pack years as of 2023   Vaping Use    Vaping status: Never Used   Substance and Sexual Activity    Alcohol use: Never     Comment: stopped 8/22    Drug use: Never    Sexual activity: Yes     Partners: Female     Birth control/protection: Condom Male   [4]   Family History  Problem Relation Name Age of Onset    No Known Problems Mother      No Known Problems Father      Pancreatic cancer Brother

## 2025-06-05 NOTE — MEDICATION SCRIBE - ADMISSION MEDICATION HISTORY
Medication Scribe Admission Medication History    Admission medication history is complete. The information provided in this note is only as accurate as the sources available at the time of the update.    Information Source(s): Patient, Patient's pharmacy, Clinic records, Hospital records, and [facility] via in-person    Pertinent Information:     Pt stated no longer taking the following medication and okay to remove white petrolatum gel, Ozempic, ferrous sul 324mg, thera-alesia.     Pt stated he taked Toresemide 100mg, prescibed order states 80mg daily. Although from previous notes he did let his provider's office know of the change.     Pt also stated that Warfarin is 9mg daily and stated he was to have an inr on 6/9/25.   Previous order found stated differently I have left a message for the pharmacist. Pt stated that there was a mix up so they made it 9mg daily. Original dosing prescribed on 5/21/25 Summary As of 5/21/2025    Full warfarin instructions: 6 mg every Tue, Fri; 9 mg all other days  Next INR check: 6/11/2025      Pt list has been updated.     Changes made to PTA medication list:  Added: None  Deleted: white petrolatum gel, Ozempic, ferrous sul 324mg, thera-alesia  Changed: None    Allergies reviewed with patient and updates made in EHR: yes    Medication History Completed By: Alicia Brantley 6/5/2025 2:50 PM    PTA Med List   Medication Sig Note Last Dose/Taking    aspirin (ASA) 81 MG chewable tablet Take 1 tablet (81 mg) by mouth daily.  6/4/2025 Evening    atorvastatin (LIPITOR) 40 MG tablet Take 1 tablet (40 mg) by mouth daily  6/4/2025 Evening    CERTAVITE/ANTIOXIDANTS tablet TAKE ONE TABLET BY MOUTH ONE TIME DAILY  6/5/2025 Morning    chlorthalidone (HYGROTON) 25 MG tablet Take 1 tablet (25 mg) by mouth daily.  6/5/2025 Morning    FEROSUL 325 (65 Fe) MG tablet Take 1 tablet (325 mg) by mouth every other day. For iron deficiency anemia  6/5/2025 Morning    levothyroxine  (SYNTHROID/LEVOTHROID) 175 MCG tablet Take 1 tablet (175 mcg) by mouth every morning (before breakfast)  6/5/2025 Morning    magnesium oxide (MAG-OX) 400 MG tablet Take 1 tablet (400 mg) by mouth daily.  6/4/2025 at  6:00 PM    metFORMIN (GLUCOPHAGE) 500 MG tablet Take 1 tablet (500 mg) by mouth 2 times daily (with meals).  6/5/2025 Morning    Omega-3 Fatty Acids (EQL FISH OIL) 1000 MG CAPS Take 1 capsule by mouth daily.  6/4/2025 Evening    potassium chloride chen ER (KLOR-CON M20) 20 MEQ CR tablet Take 2 tablets (40 mEq) by mouth 2 times daily.  6/5/2025 Morning    senna-docusate (SENEXON-S) 8.6-50 MG tablet Take 1 to 2 tablets by mouth 2 times daily as needed for constipation  Unknown    spironolactone (ALDACTONE) 50 MG tablet Take 1 tablet (50 mg) by mouth daily.  6/5/2025 Morning    torsemide (DEMADEX) 20 MG tablet Take 4 tablets (80 mg) by mouth daily as needed (weight gain, swelling, shortness of breath). 6/5/2025: Pt state he has increased to 100mg, order is prescribed as 80mg  6/4/2025 Noon    urea (CARMOL) 10 % external lotion Apply topically 2 times daily as needed for dry skin.  Unknown    vitamin C (ASCORBIC ACID) 1000 MG TABS Take 1,000 mg by mouth daily  6/5/2025 Morning    Vitamin D, Cholecalciferol, 25 MCG (1000 UT) CAPS Take 1 capsule by mouth daily.  6/5/2025 Morning    warfarin ANTICOAGULANT (COUMADIN) 3 MG tablet TAKE THREE TABLETS BY MOUTH AT BEDTIME 6/5/2025: Next inr 6/9/25 6/4/2025 Evening

## 2025-06-06 ENCOUNTER — APPOINTMENT (OUTPATIENT)
Dept: OCCUPATIONAL THERAPY | Facility: CLINIC | Age: 75
End: 2025-06-06
Payer: COMMERCIAL

## 2025-06-06 ENCOUNTER — APPOINTMENT (OUTPATIENT)
Dept: PHYSICAL THERAPY | Facility: CLINIC | Age: 75
End: 2025-06-06
Payer: COMMERCIAL

## 2025-06-06 LAB
ANION GAP SERPL CALCULATED.3IONS-SCNC: 12 MMOL/L (ref 7–15)
BUN SERPL-MCNC: 29.6 MG/DL (ref 8–23)
CALCIUM SERPL-MCNC: 9.2 MG/DL (ref 8.8–10.4)
CHLORIDE SERPL-SCNC: 89 MMOL/L (ref 98–107)
CREAT SERPL-MCNC: 1.22 MG/DL (ref 0.67–1.17)
EGFRCR SERPLBLD CKD-EPI 2021: 62 ML/MIN/1.73M2
ERYTHROCYTE [DISTWIDTH] IN BLOOD BY AUTOMATED COUNT: 13.8 % (ref 10–15)
GLUCOSE BLDC GLUCOMTR-MCNC: 101 MG/DL (ref 70–99)
GLUCOSE BLDC GLUCOMTR-MCNC: 107 MG/DL (ref 70–99)
GLUCOSE BLDC GLUCOMTR-MCNC: 120 MG/DL (ref 70–99)
GLUCOSE BLDC GLUCOMTR-MCNC: 130 MG/DL (ref 70–99)
GLUCOSE BLDC GLUCOMTR-MCNC: 156 MG/DL (ref 70–99)
GLUCOSE SERPL-MCNC: 103 MG/DL (ref 70–99)
HCO3 SERPL-SCNC: 28 MMOL/L (ref 22–29)
HCT VFR BLD AUTO: 40.8 % (ref 40–53)
HGB BLD-MCNC: 13.5 G/DL (ref 13.3–17.7)
INR PPP: 2.8 (ref 0.85–1.15)
MCH RBC QN AUTO: 26.7 PG (ref 26.5–33)
MCHC RBC AUTO-ENTMCNC: 33.1 G/DL (ref 31.5–36.5)
MCV RBC AUTO: 81 FL (ref 78–100)
PLATELET # BLD AUTO: 319 10E3/UL (ref 150–450)
POTASSIUM SERPL-SCNC: 3.2 MMOL/L (ref 3.4–5.3)
PROTHROMBIN TIME: 29.9 SECONDS (ref 11.8–14.8)
RBC # BLD AUTO: 5.06 10E6/UL (ref 4.4–5.9)
SODIUM SERPL-SCNC: 129 MMOL/L (ref 135–145)
WBC # BLD AUTO: 7.9 10E3/UL (ref 4–11)

## 2025-06-06 PROCEDURE — 85014 HEMATOCRIT: CPT

## 2025-06-06 PROCEDURE — 250N000013 HC RX MED GY IP 250 OP 250 PS 637: Performed by: STUDENT IN AN ORGANIZED HEALTH CARE EDUCATION/TRAINING PROGRAM

## 2025-06-06 PROCEDURE — 97161 PT EVAL LOW COMPLEX 20 MIN: CPT | Mod: GP

## 2025-06-06 PROCEDURE — 97116 GAIT TRAINING THERAPY: CPT | Mod: GP

## 2025-06-06 PROCEDURE — 97165 OT EVAL LOW COMPLEX 30 MIN: CPT | Mod: GO | Performed by: OCCUPATIONAL THERAPIST

## 2025-06-06 PROCEDURE — 80048 BASIC METABOLIC PNL TOTAL CA: CPT

## 2025-06-06 PROCEDURE — 97535 SELF CARE MNGMENT TRAINING: CPT | Mod: GO | Performed by: OCCUPATIONAL THERAPIST

## 2025-06-06 PROCEDURE — 97530 THERAPEUTIC ACTIVITIES: CPT | Mod: GO | Performed by: OCCUPATIONAL THERAPIST

## 2025-06-06 PROCEDURE — 97530 THERAPEUTIC ACTIVITIES: CPT | Mod: GP

## 2025-06-06 PROCEDURE — 250N000013 HC RX MED GY IP 250 OP 250 PS 637

## 2025-06-06 PROCEDURE — 36415 COLL VENOUS BLD VENIPUNCTURE: CPT

## 2025-06-06 PROCEDURE — 120N000002 HC R&B MED SURG/OB UMMC

## 2025-06-06 PROCEDURE — 99232 SBSQ HOSP IP/OBS MODERATE 35: CPT | Performed by: INTERNAL MEDICINE

## 2025-06-06 PROCEDURE — 250N000013 HC RX MED GY IP 250 OP 250 PS 637: Performed by: INTERNAL MEDICINE

## 2025-06-06 PROCEDURE — 85610 PROTHROMBIN TIME: CPT

## 2025-06-06 RX ORDER — POLYETHYLENE GLYCOL 3350 17 G/17G
17 POWDER, FOR SOLUTION ORAL 2 TIMES DAILY
Status: DISCONTINUED | OUTPATIENT
Start: 2025-06-06 | End: 2025-06-12

## 2025-06-06 RX ADMIN — ASPIRIN 81 MG CHEWABLE TABLET 81 MG: 81 TABLET CHEWABLE at 09:21

## 2025-06-06 RX ADMIN — POLYETHYLENE GLYCOL 3350 17 G: 17 POWDER, FOR SOLUTION ORAL at 21:11

## 2025-06-06 RX ADMIN — MAGNESIUM OXIDE TAB 400 MG (241.3 MG ELEMENTAL MG) 400 MG: 400 (241.3 MG) TAB at 09:22

## 2025-06-06 RX ADMIN — ATORVASTATIN CALCIUM 40 MG: 40 TABLET, FILM COATED ORAL at 21:11

## 2025-06-06 RX ADMIN — WARFARIN SODIUM 9 MG: 7.5 TABLET ORAL at 18:11

## 2025-06-06 RX ADMIN — LEVOTHYROXINE SODIUM 175 MCG: 0.05 TABLET ORAL at 09:22

## 2025-06-06 RX ADMIN — TORSEMIDE 100 MG: 100 TABLET ORAL at 12:08

## 2025-06-06 RX ADMIN — Medication 25 MCG: at 09:21

## 2025-06-06 RX ADMIN — SPIRONOLACTONE 50 MG: 50 TABLET, FILM COATED ORAL at 09:23

## 2025-06-06 ASSESSMENT — ACTIVITIES OF DAILY LIVING (ADL)
ADLS_ACUITY_SCORE: 56

## 2025-06-06 NOTE — PROGRESS NOTES
"CLINICAL NUTRITION SERVICES - ASSESSMENT NOTE    RECOMMENDATIONS FOR MDs/PROVIDERS TO ORDER:  Consider daily MVI-M in setting of poor intakes    Malnutrition Status:    Moderate malnutrition in the context of acute illness    Registered Dietitian Interventions:  Ensure Max BID Vanilla    Future/Additional Recommendations:  Monitor labs, stooling, weight trends, intakes, supplement acceptance     REASON FOR ASSESSMENT  Positive nutrition admission screen    PMH: AF on warfarin, hypothyroidism, T2DM, obesity, MDD, HTN, HLD, HFpEF who presented due to 2 weeks of worsening weakness and recent GLF     Respiratory Room air    SUBJECTIVE INFORMATION  Assessed patient in room.    NUTRITION HISTORY  Pt lives independently. Pt receives 8 heart healthy meals/week from [facility]. OA also provides ingredients for 2 sandwiches and salads and fruit. Pt normally has BID meals but for the week preceding admission he had two meals d/t lack of mobility from a fall last Sunday. During that time friends came over and helped him with two meals.   Reports only having two true meals over the last week, otherwise only small amounts of PO intake     CURRENT NUTRITION ORDERS  Diet: Low Saturated Fat/2400 mg Sodium    CURRENT INTAKE/TOLERANCE  Intake/Tolerance: 100% breakfast per pt self report  Per [facility] meal order review: first meal ordered this morning  Pt really likes the food here.    LABS  Nutrition-relevant labs: Hyponatremia, hypokalemia, MICHELLE    MEDICATIONS  Nutrition-relevant medications: Reviewed  Torsemide 100 mg daily noted    ANTHROPOMETRICS  Height: 180.3 cm (5' 10.984\")  Admission Weight: (!) 147.2 kg (324 lb 8.3 oz) (06/05/25 2300)   Most Recent Weight: (!) 147.2 kg (324 lb 8.3 oz) (06/05/25 2300)  IBW: 78.2 kg  % IBW: 188%  Body mass index is 45.28 kg/m .  BMI (kg/m ): Obesity Class III BMI > 40  Weight History: 32.4 kg (18%) weight loss over 1 month. Noted some weight loss likely related to fluid balance.   Wt Readings " from Last 15 Encounters:   06/05/25 (!) 147.2 kg (324 lb 8.3 oz)   05/08/25 (!) 179.6 kg (395 lb 14.4 oz)   05/07/25 (!) 179.4 kg (395 lb 9.6 oz)   03/26/25 (!) 182.3 kg (402 lb)   01/13/25 (!) 163 kg (359 lb 6.4 oz)   01/08/25 (!) 163.1 kg (359 lb 9.6 oz)   01/06/25 (!) 163.2 kg (359 lb 11.2 oz)   01/02/25 (!) 164.6 kg (362 lb 14.4 oz)   12/30/24 (!) 161.5 kg (356 lb)   12/23/24 (!) 161.4 kg (355 lb 12.8 oz)   12/19/24 (!) 158.8 kg (350 lb)   12/16/24 (!) 155.7 kg (343 lb 4.8 oz)   12/14/24 (!) 155.3 kg (342 lb 6 oz)   11/26/24 (!) 156.2 kg (344 lb 6.4 oz)   11/25/24 (!) 156.2 kg (344 lb 6.4 oz)     Dosing Weight: 147.2 kg, based on Actual Body Weight    ASSESSED NUTRITION NEEDS  Estimated Energy Needs: 3065 kcals/day ([place] St Jeor)  Justification: Obese  Estimated Protein Needs: 156 - 196 grams protein/day (2 - 2.5 grams of pro/kg) IBW  Justification: Obesity guidelines  Estimated Fluid Needs: 2500+ mL/day (1 mL/kcal)  Justification: Maintenance    SYSTEM FINDINGS    Skin/wounds: No significant skin impairments noted  GI symptoms: None reported  Last BM: 6/05  Bowel regimen: Scheduled    RENAL  MICHELLE    MALNUTRITION  % Intake: < 75% for > 7 days (moderate)  % Weight Loss: Unable to assess confounded by fluid balance  Subcutaneous Fat Loss: Orbital: Mild  Muscle Loss: Unable to assess d/t body habitus  Fluid Accumulation/Edema: None noted  Malnutrition Diagnosis: Moderate malnutrition in the context of acute illness or injury  Malnutrition Present on Admission: Yes    NUTRITION DIAGNOSIS  Inadequate oral intake related to loss of mobility as evidenced by pt self report, possible weight loss    INTERVENTIONS  Medical food supplement therapy    Goals  Patient to consume % of nutritionally adequate meal trays TID, or the equivalent with supplements/snacks.     Monitoring/Evaluation  Progress toward goals will be monitored and evaluated per policy.    Lupe Romero RDN, LD   6 & 8 Med/Surg RD  Mon-Fri Enrique  "contact: By name, or \"6 [OR] 8 Med Surg Clinical Dietitian\"  Weekend RD Vocera: \"Weekend Holiday Clinical Dietitian\"      "

## 2025-06-06 NOTE — PLAN OF CARE
"6MS ADMISSION    D: Patient transferred from [facility] from  via EMS for Generalized weakness. .     I: Upon arrival to the unit patient was oriented to room, unit, and call light. Patient s height, weight, and vital signs were obtained. Allergies reviewed. Provider notified of patient s arrival on the unit. Adult AVS completed. Head to toe assessment completed. Education assessment completed. Care plan initiated.    A: Vital signs stable upon admission. Patient rates pain at 6/10. Two RN skin assessment completed with  JUANPABLO Saldivar. Significant Skin Findings include scattered scabs and bruises, mild redness on buttocks. .   P: Continue to monitor patient s condition and intervene as needed. Continue with plan of care. Notify provider with any concerns or changes in patient status.        Shift: 9081-5524    VS: /86 (BP Location: Left arm)   Pulse 65   Temp 98.4  F (36.9  C) (Oral)   Resp 17   Ht 1.803 m (5' 10.98\")   Wt (!) 147.2 kg (324 lb 8.3 oz)   SpO2 96%   BMI 45.28 kg/m     CV/Resp: O2 sat stable on RA, Denies chest pain/ SOB. Appears to have  on exertion during transfer to bed. W/ CPAP orders, RT informed.  refused CPAP.   Neuro: Alert and oriented x4. Baseline numbness and tingling on hands and feet.   Pain: Generalized pain especially on pelvis and legs.   GI/: Continent of bowel and bladder. Uses bedside urinal. LBM: 6/5 per pt  Diet: Regular diet, thin liquids. Takes pills whole.   Activity: Assist of 2 persons w/ walker for transfer to bed. Uses own walker.   Skin: Dry, moisturizer applied. Scattered bruises and scabs. Refused to wear compression stockings, wanted to wear it later this morning. Compression stocking available on pt's room.    "

## 2025-06-06 NOTE — PROGRESS NOTES
06/06/25 1002   Appointment Info   Signing Clinician's Name / Credentials (OT) Anyneva Cruz, OTR/L   Living Environment   People in Home alone   Current Living Arrangements apartment  (Pt. lives in an apartment on 16th floor with elevator.)   Home Accessibility   (small living space, walker does not fit into bathroom)   Transportation Anticipated health plan transportation   Living Environment Comments Pt. lives in an apartment which he reports is very small and does not allow for walker use into bathroom.   Self-Care   Usual Activity Tolerance fair   Current Activity Tolerance poor   Regular Exercise No   Equipment Currently Used at Home walker, standard   Fall history within last six months yes   Number of times patient has fallen within last six months 6  (multiple recent falls)   Activity/Exercise/Self-Care Comment Prior to admit, pt. was living alone, however questionable ability to care for self for ADL's and I ADL's as pt. has not been able to shower or wash clothes. He reported he sponge bathes using kitchen sink.   Instrumental Activities of Daily Living (IADL)   Previous Responsibilities   (Pt. was limited in ability to complete I ADL's. He reported he recieved food and had some friends trying to assist.)   General Information   Onset of Illness/Injury or Date of Surgery 06/05/25   Additional Occupational Profile Info/Pertinent History of Current Problem Pt. is a 75 year old male admitted on 6/5/2025. He has PMH of AF on warfarin, hypothyroidism, T2DM, obesity, MDD, HTN, HLD, HFpEF who presented due to 2 weeks of worsening weakness and recent GLF. Will be admitted to medicine for further management.   Existing Precautions/Restrictions fall   General Observations and Info Pt. with limited mobility.   Cognitive Status Examination   Orientation Status orientation to person, place and time   Posture   Posture not impaired   Range of Motion Comprehensive   General Range of Motion bilateral upper extremity  ROM WFL   Strength Comprehensive (MMT)   Comment, General Manual Muscle Testing (MMT) Assessment Impaired LE   Bed Mobility   Comment (Bed Mobility) Pt. completed bed mobility for supine to sit with total assist for moving LE's out of bed and into bed.   Transfers   Transfer Comments Pt. unable to complete sit to stand.   Activities of Daily Living   BADL Assessment/Intervention lower body dressing;grooming;toileting;bathing   Bathing Assessment/Intervention   Comment, (Bathing) OT-pt. needs total assist for LE.   Lower Body Dressing Assessment/Training   Comment, (Lower Body Dressing) Pt. requires total assist with LE dressing.   Grooming Assessment/Training   Comment, (Grooming) Pt. requires set-up for grooming to complete in bed.   Toileting   Comment, (Toileting) Pt. currently dependent for toileting.   Clinical Impression   Criteria for Skilled Therapeutic Interventions Met (OT) Yes, treatment indicated   OT Diagnosis Impaired functional mobility and impaired ability to care for self.   OT Problem List-Impairments impacting ADL activity tolerance impaired;balance;flexibility;mobility;motor control;range of motion (ROM);strength   Assessment of Occupational Performance 3-5 Performance Deficits   Identified Performance Deficits Decreased ability to complete gooming, dressing, toileting, and bathinng.   Planned Therapy Interventions (OT) ADL retraining;bed mobility training;transfer training;progressive activity/exercise;home program guidelines;strengthening   Risk & Benefits of therapy have been explained care plan/treatment goals reviewed;patient;evaluation/treatment results reviewed   OT Total Evaluation Time   OT Juli Low Complexity Minutes (11267) 10   OT Goals   Therapy Frequency (OT) 5 times/week   OT Predicted Duration/Target Date for Goal Attainment 06/27/25   OT Goals Hygiene/Grooming;Lower Body Dressing;Toilet Transfer/Toileting   OT: Hygiene/Grooming supervision/stand-by assist   OT: Lower Body  Dressing Moderate assist;using adaptive equipment;including set-up/clothing retrieval   OT: Toilet Transfer/Toileting Moderate assist;using adaptive equipment;toilet transfer   Interventions   Interventions Quick Adds Self-Care/Home Management;Therapeutic Activity   Self-Care/Home Management   Self-Care/Home Mgmt/ADL, Compensatory, Meal Prep Minutes (60047) 8   Symptoms Noted During/After Treatment (Meal Preparation/Planning Training) fatigue   Treatment Detail/Skilled Intervention Intervention focused on addressing ADL's for LE dressing and grooming. Pt. declined to complete any grooming tasks bedside during session today. Pt. needed total assist for LE dressing to adjust socks. Pt. reported he has not been able to manage skin care or tania compression garments for LE.   Therapeutic Activities   Therapeutic Activity Minutes (76439) 34   Symptoms noted during/after treatment fatigue   Treatment Detail/Skilled Intervention Intervention focused on completion of bed mobility for rolling and supine to sit. Pt. was able to complete with total assist needed for moving LE's out or and into bed. Pt. was able to sit on edge of bed using bed rails for support. He was able to complete scooting to side in sitting in order to position self closer to bed rail to be closer to head of bed for return to supine. Pt. needed total assist for LE's for return to supine.   OT Discharge Planning   OT Plan OT-continue with bed mobility for supine to sit on edge of bed; grooming tasks at edge of bed; progress to standing pending pt. progress.   OT Discharge Recommendation (DC Rec) Transitional Care Facility   OT Brief overview of current status Pt. currently significantly below baseline level of function.   Total Session Time   Timed Code Treatment Minutes 42   Total Session Time (sum of timed and untimed services) 52

## 2025-06-06 NOTE — ED NOTES
Report given to JUANPABLO Leigh at 6MS. All questions answered. Transportation arranged. Jose Guadalupe Venegas RN

## 2025-06-06 NOTE — PROGRESS NOTES
"   06/06/25 7564   Appointment Info   Signing Clinician's Name / Credentials (PT) Marybeth Madrid, PT, DPT   Living Environment   People in Home alone   Current Living Arrangements apartment;other (see comments)  (elevator)   Home Accessibility no concerns   Transportation Anticipated health plan transportation   Living Environment Comments Pt lives alone in apartment. He reports that he does not have any \"reliable help\" and does not feel he can care for himself at home.   Self-Care   Usual Activity Tolerance fair   Current Activity Tolerance poor   Regular Exercise No   Equipment Currently Used at Home walker, standard   Fall history within last six months yes   Number of times patient has fallen within last six months 6   Activity/Exercise/Self-Care Comment Pt states since last hospital DC, he went to [facility] and then was DC to home. Pt state he has minimal to no help at home, uses FWW, and has significant difficulty with caring for himself.   General Information   Onset of Illness/Injury or Date of Surgery 06/05/25   Referring Physician Lucy Maddox PA-C   Patient/Family Therapy Goals Statement (PT) \"get better\"   Pertinent History of Current Problem (include personal factors and/or comorbidities that impact the POC) Per EMR, \"Clarke Moreira is a 75 year old male admitted on 6/5/2025. He has PMH of AF on warfarin, hypothyroidism, T2DM, obesity, MDD, HTN, HLD, HFpEF who presented due to 2 weeks of worsening weakness and recent GLF. Will be admitted to medicine for further management.\"   Existing Precautions/Restrictions fall   General Observations Patient supine in bed upon PT arrival   Cognition   Affect/Mental Status (Cognition) WFL   Orientation Status (Cognition) oriented x 3   Follows Commands (Cognition) WFL   Pain Assessment   Patient Currently in Pain No   Integumentary/Edema   Integumentary/Edema Comments Pt with discoloration on bilateral shins and +1 edema noted in bilateral ankles   Posture "    Posture Protracted shoulders;Forward head position   Range of Motion (ROM)   Range of Motion ROM is WFL   Strength (Manual Muscle Testing)   Strength (Manual Muscle Testing) Deficits observed during functional mobility   Strength Comments Pt with bilateral LE bbucking in standing   Bed Mobility   Comment, (Bed Mobility) Mod Ax1 supine to sit   Transfers   Comment, (Transfers) STS with Mod Ax2 with FWW   Gait/Stairs (Locomotion)   Comment, (Gait/Stairs) Not tested   Balance   Balance Comments Heavy reliance of UEs on FWW   Sensory Examination   Sensory Perception patient reports no sensory changes   Coordination   Coordination no deficits were identified   Muscle Tone   Muscle Tone no deficits were identified   Clinical Impression   Criteria for Skilled Therapeutic Intervention Yes, treatment indicated   PT Diagnosis (PT) Impaired functional mobility   Influenced by the following impairments Decreased activity tolerance, weakness   Functional limitations due to impairments Balance, ambulation, transfers, bed mobility   Clinical Presentation (PT Evaluation Complexity) stable   Clinical Presentation Rationale per clinical judgement   Clinical Decision Making (Complexity) low complexity   Planned Therapy Interventions (PT) balance training;bed mobility training;gait training;home exercise program;strengthening;transfer training;patient/family education;progressive activity/exercise;home program guidelines   Risk & Benefits of therapy have been explained evaluation/treatment results reviewed;care plan/treatment goals reviewed;risks/benefits reviewed;current/potential barriers reviewed;participants voiced agreement with care plan;participants included;patient   Clinical Impression Comments Pt functioniong below baseline at this time. It is apparent that pt was not thriving at home and having difficulty caring for himself. Pt reports he was not walking much within his small apartment and has not showered since he was  "discharged from TCU. He states he does not have consistent help or support from family or friends. Dual recommendations of LTC vs TCU persist at this time. Given patient's history of having a hospitalization, followed by TCU stay, and then being unable to care for himself at home, PT recommending LTC in order to adequately have the necessary cares and support he requires. It is clear that if pt returns home without adequate  and support, that he will not succeed. TCU would be appropriate if pt can make gains within IP PT and demonstrate increase in activity tolerance and improve strength.   PT Total Evaluation Time   PT Eval, Low Complexity Minutes (41582) 12   Physical Therapy Goals   PT Frequency 5x/week   PT Predicted Duration/Target Date for Goal Attainment 07/04/25   PT Goals Bed Mobility;Transfers;Gait   PT: Bed Mobility Modified independent;Supine to/from sit   PT: Transfers Modified independent;Sit to/from stand;Bed to/from chair;Assistive device   PT: Gait Modified independent;Assistive device;Rolling walker;100 feet   Interventions   Interventions Quick Adds Gait Training;Therapeutic Activity   Therapeutic Activity   Therapeutic Activities: dynamic activities to improve functional performance Minutes (05975) 31   Symptoms Noted During/After Treatment Fatigue   Treatment Detail/Skilled Intervention Post evaluation, instructed in bed mobility as pts first attempt himself was unsuccesful. VC used for sequencing of movements and task breakdown. Pt did requres Mod A at trunk and LEs for supine to sit and he reported mild dizziness once seated at EOB. Time taken to let symptoms subside, ~30seconds. Pt requested a \"breakdown\" of STS with aide and therapist and time taken to verbalize this process to patient. STS performed with FWW and Mod Ax2. Pt required VC for anterior lean to facilitate ease of transfer. Pt in standing rerquired Min/Mod Ax1. see gait. After exercises, pt self selected to " abruptly sit at EOB and did so SBA. Time taken at EOB for pt to improve breathing and decrease RR. Pt sit to supine Mod A x2 and all needs met call light in reach. Discussed IP PTs role with pt verablizing understand that PT provides safe recommendations for discharge. Pt understanding that going back to his prior living situation is not safe and he is open to the idea of a LTC vs  TCU. Informed care managment. Handoff given to nursing.   Gait Training   Gait Training Minutes (63779) 10   Symptoms Noted During/After Treatment (Gait Training) fatigue   Treatment Detail/Skilled Intervention Post evaluation, pt instructed in pre-gait interventions including marching, weight shifting, and anterior/posterior weight shifitng in a staggered stance. Pt required Mod A x1 for these exercises and VC for correct technique. Pt required standing break between each listed exercise due to fatigue and increase in RR. Pt unable to safely ambulate at this time with Ax2 and FWW   Distance in Feet 0   PT Discharge Planning   PT Plan Progress IND with bed mobility, Attempt gait with Ax2   PT Discharge Recommendation (DC Rec) Long term care facility;Transitional Care Facility   PT Rationale for DC Rec Pt functioniong below baseline at this time. It is apparent that pt was not thriving at home and having difficulty caring for himself. Pt reports he was not walking much within his small apartment and has not showered since he was discharged from TCU. He states he does not have consistent help or support from family or friends. Dual recommendations of LTC vs TCU persist at this time. Given patient's history of having a hospitalization, followed by TCU stay, and then being unable to care for himself at home, PT recommending LTC in order to adequately have the necessary cares and support he requires. It is clear that if pt returns home without adequate  and support, that he will not succeed. TCU would be appropriate if pt can  make gains within IP PT and demonstrate increase in activity tolerance and improve strength. Discussed recommendations with listed above and pt is open to discussing this further with care management.   PT Brief overview of current status Mod A bed mobility, STS mod Ax2 with FWW, not ambulating at this time   PT Total Distance Amb During Session (feet) 0   Physical Therapy Time and Intention   Timed Code Treatment Minutes 41   Total Session Time (sum of timed and untimed services) 53

## 2025-06-06 NOTE — PROGRESS NOTES
"[facility]   BRIEF PROGRESS NOTE    SUBJECTIVE  GOLD Team 19 transfer to our service. [facility]. He has PMH of AF on warfarin, hypothyroidism, T2DM, obesity, MDD, HTN, HLD, HFpEF who was admitted fro 2 weeks of worsening weakness, recent GLF, MICHELLE and hyponatremia.      Spoke to patient at bedside. Reports to be feeling a lot better. He had a bowel movement yesterday which has improved his abdominal pain. PT/OT evaluated the patient today and he mentions he was able to get out of bed and walk a little around the room which was more than he could do for the past week. His main concerns is dispo to TCU and eventually home care to assists with ADLs.       OBJECTIVE:  /54 (BP Location: Right arm)   Pulse 85   Temp 98.4  F (36.9  C) (Oral)   Resp 17   Ht 1.803 m (5' 10.98\")   Wt (!) 147.2 kg (324 lb 8.3 oz)   SpO2 100%   BMI 45.28 kg/m      Exam:   General: Alert and oriented, in no acute distress.  Skin: Warm and dry, no abnormalities noted.  Eyes: Extra-ocular muscles intact, pupils equal and reactive.  ENT: Speech intact, nasal passages open, no hearing impairment noted.  CV: No cyanosis or pallor, warm and well perfused.  Respiratory: No respiratory distress, no accessory muscle use.  Psychiatric: Mood and affect appear normal.   Extremities: Warm, able to move all four extremities at will.      ASSESSMENT/PLAN:    #Generalized weakness  #Recurrent Falls  #Peripheral Neuropathy  Has multiple previous admissions for similar situations (in 10/2024 and 12/2024). After those admissions, he went to TCU and with PT/OT, had improvement in strength. Unfortunately hasn't been able to have follow up with outpatient therapies since. He reports over the last two weeks having increased weakness and episodes where he legs give out while he is standing.  Imaging on admit reassuring with no acute fracture seen on CT Lumbar spine, CTH and CT A/P without acute findings. " Labs remarkable for improving Na and Cr. B12/folate wnl. Suspect falls are likely from combination of deconditioning and muscle weakness, BLE edema (which is currently well managed), and diabetic neuropathy.   -- PT/OT  -- SW to help with dispo planning      #MICHELLE, prerenal  #Hyponatremia, mild  Cr on admit 1.42, baseline appears to be around 1.0. Sodium 126 with baseline ~135. Patient reports decreased PO intake 2/2 to weakness and limited mobility. FeUrea indicative of prerenal cause of MICHELLE. Cr improving s/p 1 L bolus in ED and increase PO intake.   -- Holding Nephrotoxic PTA medications   -- Avoid nephrotoxic drugs as able  -- Daily BMP  -- Encourage PO intake   - FYI  IV contrast given for CT 6/5       #Abdominal pain, left lateral, resolved   #Constipation   Patient notes some left sided abdominal pain over the last 10 days, feels like he is constipated. Did have a small BM on  6/5. Notes worse constipation related to his limited mobility and being in bed more often. CT A/P on admit with cholelithiasis without cholecystitis, BL renal stones that are in the lower poles, nonobstructive.appears to also have moderate-large colonic stool burden without signs of obstruction.   -- Miralax BID   -- Senakot BID  -- Monitor symptoms for improvement; if worsening or become more colicky in nature, may be related to known cholelithiasis though currently low suspicion      Please see daily rounding note from GOLD TEAM 19 6/6/25 full A/P.  Shahab Kearns MD  4:50 PM

## 2025-06-06 NOTE — PROGRESS NOTES
[facility]    Medicine Progress Note - Hospitalist Service, GOLD TEAM 19    Date of Admission:  6/5/2025    Assessment & Plan     Clarke Moreira is a 75 year old male admitted on 6/5/2025. He has PMH of AF on warfarin, hypothyroidism, T2DM, obesity, MDD, HTN, HLD, HFpEF who presented due to 2 weeks of worsening weakness and recent GLF.        Generalized weakness  Recurrent Falls  Peripheral Neuropathy  Has multiple previous admissions for similar situations (in 10/2024 and 12/2024). After those admissions, he went to TCU and with PT/OT, had improvement in strength. Unfortunately hasn't been able to have follow up with outpatient therapies since. He reports over the last two weeks having increased weakness and episodes where he legs give out while he is standing. He had a fall ~5/31 during which he lowered himself to his buttocks. Denies head or other injury. Also fell 6/1 more forcefully from standing with and has since had bilateral pelvic pain. Previously had been able to ambulate with a cane/walker but since these falls, he has hardly been out of bed due to feeling weak. Reports only having two true meals over the last week, otherwise only small amounts of PO intake. Imaging on admit reassuring with no acute fracture seen on CT Lumbar spine, CTH and CT A/P without acute findings. Labs remarkable for Na 126, Cr 1.42, AG 16, stable Hgb, UA noninfectious, CXR negative. . Troponin mildly elevated at 33 -> 32.   -- Suspect falls are likely from combination of deconditioning and muscle weakness, BLE edema (which is currently well managed), and diabetic neuropathy.   -- Will check B12/Folate  -- PT/OT  -- SW to help with dispo planning       MICHELLE, prerenal  Hyponatremia, mild  Cr on admit 1.42, baseline appears to be around 1.0. Sodium 126 with baseline ~135. Patient reports decreased PO intake 2/2 to weakness and limited mobility. FeUrea indicative of prerenal  "cause of MICHELLE. He was given 1L NS in the ED. Low suspicion that this is contributing to his falls over the last several months given previously normal sodiums.   -- olding select PTA meds as below  -- Avoid nephrotoxic drugs as able  -- Daily BMP  -- No current fluid restriction  - did get iv contrast  for CT 6/5      Abdominal pain, left lateral  Constipation   Patient notes some left sided abdominal pain over the last 10 days, feels like he is constipated. Did have a small BM earlier today 6/5. Notes worse constipation related to his limited mobility and being in bed more often.   -CT A/P on admit with cholelithiasis without cholecystitis, BL renal stones that are in the lower poles, nonobstructive.appears to also have moderate-large colonic stool burden without signs of obstruction. Some mild TTP on exam, no acute abdomen.  - had BM last night, but no BM for week   -- Miralax bid   -- Senakot BID  -- Monitor symptoms for improvement; if worsening or become more colicky in nature, may be related to known cholelithiasis though currently low suspicion      Cholelithiasis  - without evidence of cholecystitis  - no ruq tenderness      HFpEF without current exacerbation  Chronic AF on Warfarin  HTN  HLD  Chronic BLE Edema, stable  Most recent Echo from 2024 with EF 55-60%, overall difficult study though biventricular function appeared normal. He was seen in in HFpEF clinic on 5/7/25 where edema and weakness was discussed. His BB was discontinued and he was started on Chlorthalidone. Semaglutide, SGLT2i, and Diltiazem were possible options going forward (patient previously declined starting SGLT2i in 3/2024 due to feeling well). On admit, troponin mildly elevated at 33 -> 32,  (improved from 1044 on 5/7/25). EKG with rate controlled AF. BLE edema currently better than it had been per patient, improved since cardiology adjusted medications. Reports some \"labored\" breathing more so when he lays flat, sounding " consistent with orthopnea; he sleeps with HOB at 30 degrees at baseline. No current concern for exacerbation, overall dry on exam. Weight 395 on 5/7, no update as of yet this admission.  -- HOLD PTA Chlorthalidone 25mg daily in setting of acute MICHELLE  -- HOLD PTA Potassium 40meq BID in the setting of acute MICHELLE given risk for hyperkalemia  -- Continue PTA Torsemide 100mg  -- Continue PTA Spironolactone 50mg daily   -- Continue PTA ASA 81 daily  -- Continue PTA Atorvastatin 40mg daily  -- Pharmacy to dose warfarin  -- PT/OT  -- Compression stockings      T2DM  Peripheral Neuropathy  A1C on 3/26/25 6.2, stable compared to previous years trends. Home regimen includes Metformin 500mg BID. Was prescribed Ozempic but has yet to start it, wanting to wait until follow up appt with PCP in June. Patient has noted longstanding BLE neuropathy (numbness and pain to light touch) that he feels is getting worse and may be contributing to recent falls. He follows with podiatry and was seen on 5/21/25 with no active ulcerations and sensations diminished bilaterally. Planning to see again in 12 weeks.  -- HOLD PTA Metformin x 48 hr after iv contrast   -- MSSI  -- Hypoglycemia protocol     Hypothyroidism  TSH on admit 0.48. WNL in 10/2024. Has been on 175mcg Synthroid daily.   -- Continue PTA Synthroid 175mcg daily      MDD  Follows with therapist outpatient. No chronic medications  -- Noted       Morbid obesity  - defer back to PMD         Diet: Combination Diet Low Saturated Fat Na <2400mg Diet    DVT Prophylaxis: Warfarin  Bazzi Catheter: Not present  Lines: None     Cardiac Monitoring: None  Code Status: No CPR- Do NOT Intubate      Clinically Significant Risk Factors Present on Admission        # Hypokalemia: Lowest K = 3.2 mmol/L in last 2 days, will replace as needed  # Hyponatremia: Lowest Na = 126 mmol/L in last 2 days, will monitor as appropriate  # Hypochloremia: Lowest Cl = 85 mmol/L in last 2 days, will monitor as appropriate  "      # Drug Induced Coagulation Defect: home medication list includes an anticoagulant medication  # Drug Induced Platelet Defect: home medication list includes an antiplatelet medication   # Hypertension: Noted on problem list  # Chronic heart failure with preserved ejection fraction: heart failure noted on problem list and last echo with EF >50%          # Morbid Obesity: Estimated body mass index is 45.28 kg/m  as calculated from the following:    Height as of this encounter: 1.803 m (5' 10.98\").    Weight as of this encounter: 147.2 kg (324 lb 8.3 oz).       # Financial/Environmental Concerns:           Social Drivers of Health    Housing Stability: Low Risk  (6/6/2025)    Housing Stability     Do you have housing? : Yes     Are you worried about losing your housing?: No   Recent Concern: Housing Stability - High Risk (3/26/2025)    Housing Stability     Do you have housing? : Yes     Are you worried about losing your housing?: Yes   Physical Activity: Inactive (3/26/2025)    Exercise Vital Sign     Days of Exercise per Week: 0 days     Minutes of Exercise per Session: 0 min   Social Connections: Unknown (3/26/2025)    Social Connection and Isolation Panel [NHANES]     Frequency of Social Gatherings with Friends and Family: Patient declined          Disposition Plan     Medically Ready for Discharge: Anticipated in 5+ Days             Lucila Sorensen MD  Hospitalist Service, GOLD TEAM 19  [facility]  Securely message with [employer] (more info)  Text page via [employer] Paging/Directory   See signed in provider for up to date coverage information  ______________________________________________________________________    Interval History   Abdominal pain better , had BM last night     Physical Exam   Vital Signs: Temp: 98.8  F (37.1  C) Temp src: Axillary BP: 111/68 Pulse: 81   Resp: 18 SpO2: 97 % O2 Device: None (Room air)    Weight: 324 lbs 8.27 oz  General appearence: " awake alert  in  no apparent distress      RESPIRATORY: lungs clear    CARDIOVASCULAR:S1 S2 distant HS, irreg irreg   GASTROINTESTINAL:soft,  obese, non-distended ,  + bowel sounds,   SKIN: warm and dry, no mottling noted   NEUROLOGIC; awake alert and oriented,  EXTREMITIES: no clubbing, cyanosis, has wrinkling of skin in leg    Data     I have personally reviewed the following data over the past 24 hrs:    7.9  \   13.5   / 319     129 (L) 89 (L) 29.6 (H) /  130 (H)   3.2 (L) 28 1.22 (H) \     ALT: 32 AST: 45 AP: 104 TBILI: 0.6   ALB: 3.9 TOT PROTEIN: 7.7 LIPASE: N/A     Trop: 32 (H) BNP: 498     TSH: 0.48 T4: N/A A1C: 6.4 (H)     INR:  2.80 (H) PTT:  N/A   D-dimer:  N/A Fibrinogen:  N/A       Imaging results reviewed over the past 24 hrs:   Recent Results (from the past 24 hours)   Chest XR,  PA & LAT    Narrative    EXAM: XR CHEST 2 VIEWS  6/5/2025 11:56 AM     HISTORY:  Generalized weakness       COMPARISON:  Chest radiograph 3/6/2019, CT chest 9/22/2024    FINDINGS:   Trachea is midline. Cardiac silhouette is within normal limits.  Calcifications of the aortic knob. No focal consolidative opacity. No  pleural effusion. No pneumothorax.    No acute osseous abnormality. Visualized soft tissues and upper  abdomen are unremarkable.         Impression    IMPRESSION:   No acute focal airspace disease.    I have personally reviewed the examination and initial interpretation  and I agree with the findings.    SOL CRAIG MD         SYSTEM ID:  [id number]   Head CT w/o contrast    Narrative    EXAM: CT HEAD W/O CONTRAST  6/5/2025 2:30 PM     HISTORY:  Headache, anticoagulated evaluate spontaneous bleed       COMPARISON:  CT 12/7/2024    TECHNIQUE: Using multidetector thin collimation helical acquisition  technique, axial, coronal and sagittal CT images from the skull base  to the vertex were obtained without intravenous contrast.   (topogram) image(s) also obtained and reviewed.    FINDINGS:  No acute intracranial  hemorrhage, mass effect, or midline shift. No  acute loss of gray-white matter differentiation in the cerebral  hemispheres. 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 Ventricles are proportionate to the cerebral  sulci. Clear basal cisterns.    The bony calvaria and the bones of the skull base are normal. The  visualized portions of the paranasal sinuses and mastoid air cells are  clear. Grossly normal orbits.       Impression    IMPRESSION:   1. No acute intracranial pathology.  2. Unchanged chronic infarcts within the bilateral occipital lobes,  left greater than right     I have personally reviewed the examination and initial interpretation  and I agree with the findings.    TARAN AMADO MD         SYSTEM ID:  [id number]   CT Abdomen Pelvis w Contrast    Narrative    EXAMINATION: CT ABDOMEN PELVIS W CONTRAST, 6/5/2025 2:37 PM    INDICATION: Left lower quadrant pain, generalized weakness, fall 4  days ago with bilateral hip pain, further evaluate    COMPARISON STUDY: 9/22/2024    TECHNIQUE: CT scan of the abdomen and pelvis was performed on  multidetector CT scanner using volumetric acquisition technique and  images were reconstructed in multiple planes with variable thickness  and reviewed on dedicated workstations.     CONTRAST: 135cc of isovue 370 injected IV without oral contrast    CT scan radiation dose is optimized to minimum requisite dose using  automated dose modulation techniques.    FINDINGS:    Lower thorax: Normal.    Liver: No mass. No intrahepatic biliary ductal dilation.    Biliary System: Cholelithiasis without cholecystitis.    Pancreas: No mass or pancreatic ductal dilation.    Adrenal glands: No mass or nodules    Spleen: Normal.    Kidneys: No suspicious mass, obstructing calculus or hydronephrosis.   Nonobstructing stone in the lower pole of the left kidney measuring 3  mm with second stone measuring 2 mm in the lower pole more  posteriorly. 2 mm right  lower pole nonobstructing renal stone.    Gastrointestinal tract :Normal appendix. Normal caliber small bowel.    Mesentery/peritoneum/retroperitoneum: Small fat-containing umbilical  hernia. There No mass. No free fluid or air.    Lymph nodes: No significant lymphadenopathy.    Vasculature: Patent major abdominal vasculature.    Pelvis: Urinary bladder is normal.  1  Osseous structures: No aggressive or acute osseous lesion.      Soft tissues: Within normal limits.       Impression    IMPRESSION:   1. No acute abnormality within the abdomen or pelvis.  2. Cholelithiasis without cholecystitis.  3. Nonobstructing lower pole renal stones measure up to 3 mm.    I have personally reviewed the examination and initial interpretation  and I agree with the findings.    AILYN RAMIREZ MD         SYSTEM ID:  [id number]   CT Lumbar Spine Reconstructed    Narrative    CT LUMBAR SPINE RECONSTRUCTED 6/5/2025 2:38 PM    History: Fall, pain, evaluate fracture.    Comparison: Lumbar spine CT 10/25/2024.    Technique: Reconstructed axial, coronal and sagittal CT images through  the lumbar spine were obtained from same day CT abdomen.     Findings:   Limited imaging. There are 5 lumbar type vertebrae. Regarding  alignment, mild levocurvature of the lumbar spine. There is moderate  to severe disc space narrowing at L4-5, otherwise preserved disc  spaces. Mild to moderate spondylosis of the lumbar spine with endplate  spurring, osteophytic bridging and facet arthropathy.     Please see same day CT abdomen for intra-abdominal and thoracic  findings.    Findings on a level by level basis are as follows:    L1-L2: No spinal canal or neuroforaminal stenosis.    L2-L3: No spinal canal or neuroforaminal stenosis.    L3-L4: Bilateral moderate neural foraminal stenosis. No spinal canal  stenosis.    L4-L5: Severe bilateral neural foraminal stenosis. No spinal canal  stenosis with    L5-S1: No spinal canal or neuroforaminal stenosis.       Impression    Impression:  1.  No acute fracture or traumatic dislocation of the lumbar spine.  2.  Multilevel lumbar degenerative changes, most pronounced at L3-4  and L4-5 with moderate and severe neural foraminal stenosis,  respectively. No high-grade spinal canal stenosis.     I have personally reviewed the examination and initial interpretation  and I agree with the findings.    TARAN AMADO MD         SYSTEM ID:  [id number]

## 2025-06-07 ENCOUNTER — APPOINTMENT (OUTPATIENT)
Dept: OCCUPATIONAL THERAPY | Facility: CLINIC | Age: 75
End: 2025-06-07
Payer: COMMERCIAL

## 2025-06-07 LAB
ANION GAP SERPL CALCULATED.3IONS-SCNC: 16 MMOL/L (ref 7–15)
BUN SERPL-MCNC: 39.4 MG/DL (ref 8–23)
CALCIUM SERPL-MCNC: 9.4 MG/DL (ref 8.8–10.4)
CHLORIDE SERPL-SCNC: 84 MMOL/L (ref 98–107)
CREAT SERPL-MCNC: 1.29 MG/DL (ref 0.67–1.17)
EGFRCR SERPLBLD CKD-EPI 2021: 58 ML/MIN/1.73M2
ERYTHROCYTE [DISTWIDTH] IN BLOOD BY AUTOMATED COUNT: 13.5 % (ref 10–15)
GLUCOSE BLDC GLUCOMTR-MCNC: 111 MG/DL (ref 70–99)
GLUCOSE BLDC GLUCOMTR-MCNC: 125 MG/DL (ref 70–99)
GLUCOSE BLDC GLUCOMTR-MCNC: 131 MG/DL (ref 70–99)
GLUCOSE BLDC GLUCOMTR-MCNC: 144 MG/DL (ref 70–99)
GLUCOSE SERPL-MCNC: 125 MG/DL (ref 70–99)
HCO3 SERPL-SCNC: 30 MMOL/L (ref 22–29)
HCT VFR BLD AUTO: 45.6 % (ref 40–53)
HGB BLD-MCNC: 15.2 G/DL (ref 13.3–17.7)
HOLD SPECIMEN: NORMAL
INR PPP: 2.28 (ref 0.85–1.15)
MAGNESIUM SERPL-MCNC: 1.7 MG/DL (ref 1.7–2.3)
MCH RBC QN AUTO: 27.3 PG (ref 26.5–33)
MCHC RBC AUTO-ENTMCNC: 33.3 G/DL (ref 31.5–36.5)
MCV RBC AUTO: 82 FL (ref 78–100)
PHOSPHATE SERPL-MCNC: 3 MG/DL (ref 2.5–4.5)
PLATELET # BLD AUTO: 329 10E3/UL (ref 150–450)
POTASSIUM SERPL-SCNC: 2.4 MMOL/L (ref 3.4–5.3)
POTASSIUM SERPL-SCNC: 3.2 MMOL/L (ref 3.4–5.3)
PROTHROMBIN TIME: 25.6 SECONDS (ref 11.8–14.8)
RBC # BLD AUTO: 5.57 10E6/UL (ref 4.4–5.9)
SODIUM SERPL-SCNC: 130 MMOL/L (ref 135–145)
WBC # BLD AUTO: 7.6 10E3/UL (ref 4–11)

## 2025-06-07 PROCEDURE — 84132 ASSAY OF SERUM POTASSIUM: CPT

## 2025-06-07 PROCEDURE — 84100 ASSAY OF PHOSPHORUS: CPT

## 2025-06-07 PROCEDURE — 250N000013 HC RX MED GY IP 250 OP 250 PS 637: Performed by: INTERNAL MEDICINE

## 2025-06-07 PROCEDURE — 83735 ASSAY OF MAGNESIUM: CPT

## 2025-06-07 PROCEDURE — 85610 PROTHROMBIN TIME: CPT

## 2025-06-07 PROCEDURE — 85027 COMPLETE CBC AUTOMATED: CPT

## 2025-06-07 PROCEDURE — 120N000002 HC R&B MED SURG/OB UMMC

## 2025-06-07 PROCEDURE — 36415 COLL VENOUS BLD VENIPUNCTURE: CPT

## 2025-06-07 PROCEDURE — 250N000013 HC RX MED GY IP 250 OP 250 PS 637

## 2025-06-07 PROCEDURE — 80048 BASIC METABOLIC PNL TOTAL CA: CPT

## 2025-06-07 PROCEDURE — 97535 SELF CARE MNGMENT TRAINING: CPT | Mod: GO | Performed by: OCCUPATIONAL THERAPIST

## 2025-06-07 PROCEDURE — 97530 THERAPEUTIC ACTIVITIES: CPT | Mod: GO | Performed by: OCCUPATIONAL THERAPIST

## 2025-06-07 PROCEDURE — 250N000013 HC RX MED GY IP 250 OP 250 PS 637: Performed by: STUDENT IN AN ORGANIZED HEALTH CARE EDUCATION/TRAINING PROGRAM

## 2025-06-07 PROCEDURE — 99232 SBSQ HOSP IP/OBS MODERATE 35: CPT | Mod: GC

## 2025-06-07 PROCEDURE — 97110 THERAPEUTIC EXERCISES: CPT | Mod: GO | Performed by: OCCUPATIONAL THERAPIST

## 2025-06-07 RX ORDER — POTASSIUM CHLORIDE 1500 MG/1
40 TABLET, EXTENDED RELEASE ORAL ONCE
Status: COMPLETED | OUTPATIENT
Start: 2025-06-07 | End: 2025-06-07

## 2025-06-07 RX ORDER — TORSEMIDE 100 MG/1
100 TABLET ORAL DAILY
Status: DISCONTINUED | OUTPATIENT
Start: 2025-06-07 | End: 2025-06-15 | Stop reason: HOSPADM

## 2025-06-07 RX ORDER — TORSEMIDE 100 MG/1
100 TABLET ORAL
Status: DISCONTINUED | OUTPATIENT
Start: 2025-06-07 | End: 2025-06-07

## 2025-06-07 RX ORDER — POTASSIUM CHLORIDE 1500 MG/1
20 TABLET, EXTENDED RELEASE ORAL ONCE
Status: COMPLETED | OUTPATIENT
Start: 2025-06-07 | End: 2025-06-07

## 2025-06-07 RX ADMIN — WARFARIN SODIUM 9 MG: 7.5 TABLET ORAL at 17:24

## 2025-06-07 RX ADMIN — POLYETHYLENE GLYCOL 3350 17 G: 17 POWDER, FOR SOLUTION ORAL at 07:45

## 2025-06-07 RX ADMIN — LEVOTHYROXINE SODIUM 175 MCG: 0.05 TABLET ORAL at 06:33

## 2025-06-07 RX ADMIN — POTASSIUM CHLORIDE 20 MEQ: 1500 TABLET, EXTENDED RELEASE ORAL at 12:18

## 2025-06-07 RX ADMIN — Medication 25 MCG: at 07:45

## 2025-06-07 RX ADMIN — TORSEMIDE 100 MG: 100 TABLET ORAL at 12:35

## 2025-06-07 RX ADMIN — SPIRONOLACTONE 50 MG: 50 TABLET, FILM COATED ORAL at 07:45

## 2025-06-07 RX ADMIN — POTASSIUM CHLORIDE 40 MEQ: 1500 TABLET, EXTENDED RELEASE ORAL at 10:06

## 2025-06-07 RX ADMIN — ATORVASTATIN CALCIUM 40 MG: 40 TABLET, FILM COATED ORAL at 21:29

## 2025-06-07 RX ADMIN — MAGNESIUM OXIDE TAB 400 MG (241.3 MG ELEMENTAL MG) 400 MG: 400 (241.3 MG) TAB at 07:45

## 2025-06-07 RX ADMIN — ASPIRIN 81 MG CHEWABLE TABLET 81 MG: 81 TABLET CHEWABLE at 07:45

## 2025-06-07 ASSESSMENT — ACTIVITIES OF DAILY LIVING (ADL)
ADLS_ACUITY_SCORE: 56

## 2025-06-07 NOTE — PLAN OF CARE
[id number]    Goal Outcome Evaluation:      Plan of Care Reviewed With: patient    Overall Patient Progress: no changeOverall Patient Progress: no change     Pt. is A & O X 4. stable on room air. Makes needs known. Denies SOB or acute distress.    Denied N/T. N/V, CP, lightheadedness or Pain   Independent with repositioning in bed. Respiratory: Sats >90. Continent of bladder this shift using bed side urinal. Diet: Reg/Thin/Whole. PIV SL  Discharge plan: TBD  Precaution: Fall risk,  Bed alarm on. Pt. Slept most of the night except during cares. Call button placed within reach. Plan of care is ongoing.  Pt. Declined CPAP and pulse oxy meter offered      0200 hrs

## 2025-06-07 NOTE — PLAN OF CARE
"Goal Outcome Evaluation:  /66 (BP Location: Right arm)   Pulse 78   Temp 97.8  F (36.6  C) (Oral)   Resp 17   Ht 1.803 m (5' 10.98\")   Wt (!) 145.8 kg (321 lb 6.9 oz)   SpO2 98%   BMI 44.85 kg/m    Problem: Adult Inpatient Plan of Care  Goal: Plan of Care Review  Description: The Plan of Care Review/Shift note should be completed every shift.  The Outcome Evaluation is a brief statement about your assessment that the patient is improving, declining, or no change.  This information will be displayed automatically on your shift  note.  Outcome: Progressing  Flowsheets (Taken 6/7/2025 1143)  Outcome Evaluation: Critical lab results this am: Potassium 2.4, provider notified, new orders in place, replaced, scheduled next lab draw. Patient refused b/s checks this morning, provider notified. Continues at his baseline, no acute event, rates pain 4/10 declined interventions. call light within raech plan of care continues.  Plan of Care Reviewed With: patient  Overall Patient Progress: no change  Goal: Patient-Specific Goal (Individualized)  Description: You can add care plan individualizations to a care plan. Examples of Individualization might be:  \"Parent requests to be called daily at 9am for status\", \"I have a hard time hearing out of my right ear\", or \"Do not touch me to wake me up as it startles  me\".  Outcome: Progressing  Goal: Absence of Hospital-Acquired Illness or Injury  Outcome: Progressing  Intervention: Identify and Manage Fall Risk  Recent Flowsheet Documentation  Taken 6/7/2025 0928 by Janna Castillo RN  Safety Promotion/Fall Prevention:   assistive device/personal items within reach   clutter free environment maintained   mobility aid in reach   nonskid shoes/slippers when out of bed   room near nurse's station   room door open  Intervention: Prevent Skin Injury  Recent Flowsheet Documentation  Taken 6/7/2025 0928 by Janna Castillo, RN  Body Position: position changed " independently  Intervention: Prevent Infection  Recent Flowsheet Documentation  Taken 6/7/2025 0928 by Janna Castillo RN  Infection Prevention: hand hygiene promoted  Goal: Optimal Comfort and Wellbeing  Outcome: Progressing  Goal: Readiness for Transition of Care  Outcome: Progressing     Problem: Delirium  Goal: Optimal Coping  Outcome: Progressing  Goal: Improved Behavioral Control  Outcome: Progressing  Intervention: Minimize Safety Risk  Recent Flowsheet Documentation  Taken 6/7/2025 0928 by Janna Castillo RN  Enhanced Safety Measures:   assistive devices when indicated   pain management   room near unit station  Goal: Improved Attention and Thought Clarity  Outcome: Progressing  Intervention: Maximize Cognitive Function  Recent Flowsheet Documentation  Taken 6/7/2025 0928 by Janna Castillo RN  Sensory Stimulation Regulation: care clustered  Reorientation Measures:   calendar in view   clock in view  Goal: Improved Sleep  Outcome: Progressing     Problem: Fall Injury Risk  Goal: Absence of Fall and Fall-Related Injury  Outcome: Progressing  Intervention: Identify and Manage Contributors  Recent Flowsheet Documentation  Taken 6/7/2025 0928 by Janna Castillo RN  Medication Review/Management: medications reviewed  Intervention: Promote Injury-Free Environment  Recent Flowsheet Documentation  Taken 6/7/2025 0928 by Janna Castillo RN  Safety Promotion/Fall Prevention:   assistive device/personal items within reach   clutter free environment maintained   mobility aid in reach   nonskid shoes/slippers when out of bed   room near nurse's station   room door open     Problem: Pain Acute  Goal: Optimal Pain Control and Function  Outcome: Progressing  Intervention: Prevent or Manage Pain  Recent Flowsheet Documentation  Taken 6/7/2025 0928 by Janna Castillo RN  Sensory Stimulation Regulation: care clustered  Medication Review/Management: medications reviewed     Problem: Diabetes  Goal:  Optimal Coping  Outcome: Progressing  Goal: Optimal Functional Ability  Outcome: Progressing  Intervention: Optimize Functional Ability  Recent Flowsheet Documentation  Taken 6/7/2025 0928 by Janna Castillo, RN  Assistive Device Utilized:   walker   gait belt  Activity Management: activity adjusted per tolerance  Activity Assistance Provided: assistance, 2 people  Goal: Blood Glucose Level Within Target Range  Outcome: Progressing  Goal: Minimize Risk of Hypoglycemia  Outcome: Progressing         Plan of Care Reviewed With: patient    Overall Patient Progress: no changeOverall Patient Progress: no change    Outcome Evaluation: Critical lab results this am: Potassium 2.4, provider notified, new orders in place, replaced, scheduled next lab draw. Patient refused b/s checks this morning, provider notified. Continues at his baseline, no acute event, rates pain 4/10 declined interventions. call light within reach plan of care continues.

## 2025-06-07 NOTE — PROGRESS NOTES
[facility]    Progress Note - [facility]       Date of Admission:  6/5/2025    Assessment & Plan   Clarke Moreira is a 75 year old male admitted on 6/5/2025. He has PMH of AF on warfarin, hypothyroidism, T2DM, MDD, HTN, HLD, HFpEF who presented due to 2 weeks of worsening weakness and recent fall from standing.     Generalized weakness  Recurrent Falls  Peripheral Neuropathy  Has multiple previous admissions for similar situations (in 10/2024 and 12/2024). After those admissions, he went to TCU and with PT/OT, had improvement in strength. Unfortunately hasn't been able to have follow up with outpatient therapies since his home environment feels unsafe and makes it hard for him to get up or leave the house (as below). He reports over the last two weeks having increased weakness and episodes where he legs give out while he is standing. He had a fall ~5/31 during which he lowered himself to his buttocks. Denies head or other injury. Also fell 6/1 more forcefully from standing with and has since had bilateral pelvic pain. Previously had been able to ambulate with a cane/walker but since these falls, he has hardly been out of bed due to feeling weak. Low PO intake during this time. Imaging on admit reassuring with no acute fracture seen on CT Lumbar spine, CTH and CT A/P without acute findings. Suspect falls are likely from combination of deconditioning and muscle weakness, BLE edema (which is currently well managed), and diabetic neuropathy.   -- Continue working with PT/OT   -Discharge recommendation LTC, but TCU appropriate if pt can make gains within IP PT and demonstrate increase in activity tolerance and improve strength.   -- Discharge planning as below    Poor PO intake  Significant risk for refeeding syndrome  Hypokalemia  Reports only having two true meals over the last week, otherwise only small amounts of PO intake. Lost 19% of body  weight in the last month. Previously got meals with [facility]. At high risk for refeeding syndrome due to extreme weight loss. Potassium critical value 2.4 6/7AM, on replacement protocol. Mg and phos wnl.   -- Daily mag, phos, CMP  -- Replacing K per protocol    Unsafe living environment  Discharge planning  As above, home environment was not meeting nutritional nor PT/OT therapeutic needs after past TCU stay. Reports that his floor is covered in dirt and urine. Has had success with prior therapies at TCU but then returns home and is unable to maintain progress or his desired level of function. Anticipate will need to work closely and creatively with care coordination for finding a safe option for discharge. Patient would like to go to TCU and afterward receive home care w/ assistance with ADLs.  -- SW to help with dispo planning    MICHELLE, prerenal, improving  Hyponatremia, mild, improving  Cr on admit 1.42, baseline appears to be around 1.0. Sodium 126 with baseline ~135. Improving throughout admission. Patient reports decreased PO intake 2/2 to weakness and limited mobility. FeUrea indicative of prerenal cause of MICHELLE. He was given 1L NS in the ED. Low suspicion that this is contributing to his falls over the last several months given previously normal sodiums.   -- Holding select PTA meds as below  -- Avoid nephrotoxic drugs as able  -- Daily BMP  -- No current fluid restriction  -- Received IV contrast for CT 6/5      Abdominal pain, left lateral, improving  Constipation   Patient notes some left sided abdominal pain over the last 10 days, feels like he is constipated. Did have a small BM 6/5 but none a week prior. Notes worse constipation related to his limited mobility and being in bed more often. CT A/P on admit with cholelithiasis without cholecystitis No RUQ tenderness. CT with BL renal stones that are in the lower poles, nonobstructive.appears to also have moderate-large colonic stool burden without signs  "of obstruction. Some mild TTP on exam, no acute abdomen.  -- Miralax  BID  -- Senakot BID  -- Monitor symptoms for improvement; if worsening or become more colicky in nature, may be related to known cholelithiasis though currently low suspicion     HFpEF without current exacerbation  Chronic AF on Warfarin  HTN  HLD  Chronic BLE Edema, stable  Most recent Echo from 2024 with EF 55-60%. He was seen in in HFpEF clinic on 5/7/25 where edema and weakness was discussed. His BB was discontinued and he was started on Chlorthalidone. Semaglutide, SGLT2i, and Diltiazem were possible options going forward (patient previously declined starting SGLT2i in 3/2024 due to feeling well). On admit, troponin mildly elevated at 33 -> 32,  (improved from 1044 on 5/7/25). EKG with rate controlled AF. BLE edema currently better than it had been per patient, improved since cardiology adjusted medications. Reports some \"labored\" breathing more so when he lays flat, sounding consistent with orthopnea; he sleeps with HOB at 30 degrees at baseline. No current concern for exacerbation, overall dry on exam. No weight gain.  -- HOLD PTA Chlorthalidone 25mg daily in setting of acute MICHELLE  -- HOLD PTA Potassium 40meq BID in the setting of acute MICHELLE given risk for hyperkalemia  -- Continue PTA Torsemide 100mg  -- Continue PTA Spironolactone 50mg daily   -- Continue PTA ASA 81 daily  -- Continue PTA Atorvastatin 40mg daily  -- Pharmacy to dose warfarin  -- PT/OT  -- Compression stockings      T2DM  Peripheral Neuropathy  A1C on 3/26/25 6.2, stable compared to previous years trends. Home regimen includes Metformin 500mg BID. Was prescribed Ozempic but has yet to start it, wanting to wait until follow up appt with PCP in June. Patient has noted longstanding BLE neuropathy (numbness and pain to light touch) that he feels is getting worse and may be contributing to recent falls. He follows with podiatry and was seen on 5/21/25 with no active " ulcerations and sensations diminished bilaterally. Planning to see again in 12 weeks.  -- HOLD PTA Metformin x 48 hr after iv contrast   -- MSSI  -- Hypoglycemia protocol     Hypothyroidism  TSH on admit 0.48. WNL in 10/2024. Has been on 175mcg Synthroid daily.   -- Continue PTA Synthroid 175mcg daily      MDD  Follows with therapist outpatient. No chronic medications  -- Noted           Diet: Combination Diet Regular Diet    DVT Prophylaxis: Warfarin  Bazzi Catheter: Not present  Fluids: PO  Lines: None     Cardiac Monitoring: None  Code Status: No CPR- Do NOT Intubate      Clinically Significant Risk Factors        # Hypokalemia: Lowest K = 2.4 mmol/L in last 2 days, will replace as needed  # Hyponatremia: Lowest Na = 128 mmol/L in last 2 days, will monitor as appropriate  # Hypochloremia: Lowest Cl = 84 mmol/L in last 2 days, will monitor as appropriate          # Hypertension: Noted on problem list    # Chronic heart failure with preserved ejection fraction: heart failure noted on problem list and last echo with EF >50%           # Moderate Malnutrition: based on nutrition assessment and treatment provided per dietitian's recommendations., PRESENT ON ADMISSION     # Financial/Environmental Concerns:           Social Drivers of Health   Housing Stability: Low Risk  (6/6/2025)    Housing Stability     Do you have housing? : Yes     Are you worried about losing your housing?: No   Recent Concern: Housing Stability - High Risk (3/26/2025)    Housing Stability     Do you have housing? : Yes     Are you worried about losing your housing?: Yes   Physical Activity: Inactive (3/26/2025)    Exercise Vital Sign     Days of Exercise per Week: 0 days     Minutes of Exercise per Session: 0 min   Social Connections: Unknown (3/26/2025)    Social Connection and Isolation Panel [NHANES]     Frequency of Social Gatherings with Friends and Family: Patient declined         Disposition Plan     Medically Ready for Discharge:  Anticipated in 2-4 Days         The patient's care was discussed with the Attending Physician, Dr. George.    Cassandra Tejeda, MS4  [employer]    I was present with the medical student who participated in the service and in the documentation of this note. I have verified the history and personally performed the physical exam and medical decision making, and have verified the content of the note, which accurately reflects my assessment of the patient and the plan of care.   MD [facility]  [facility]  Securely message with [employer] (more info)  Text page via [facility] Paging/Directory   See signed in provider for up to date coverage information  ______________________________________________________________________    Interval History   Happy to be transferring to Eliz's team which feels familiar to him. Did leak some loose stool during PT session, otherwise no bowel movements since 6.5. No RUQ pain.Pelvis still painful but less so that on presentation.Mentions that going home feels like an unsafe option. Dirt and urine on the floor. Had friends come to help, everyone got overwhelmed within 15 minutes. Previously got meals with [employer] [facility].     Physical Exam   Vital Signs: Temp: 97.8  F (36.6  C) Temp src: Oral BP: 120/66 Pulse: 78   Resp: 17 SpO2: 98 % O2 Device: None (Room air)    Weight: 321 lbs 6.89 oz    Physical Exam  Vitals reviewed.   Constitutional:       General: He is not in acute distress.     Appearance: Normal appearance. He is not ill-appearing.   HENT:      Head: Normocephalic and atraumatic.   Eyes:      Conjunctiva/sclera: Conjunctivae normal.   Cardiovascular:      Rate and Rhythm: Normal rate. Rhythm irregularly irregular.      Heart sounds: No murmur heard.  Pulmonary:      Effort: Pulmonary effort is normal. No respiratory distress.      Breath sounds: Normal breath sounds. No  wheezing or rales.   Musculoskeletal:      Right lower leg: No edema.      Left lower leg: No edema.   Neurological:      General: No focal deficit present.      Mental Status: He is alert.   Psychiatric:         Behavior: Behavior normal.         Thought Content: Thought content normal.           Medical Decision Making       Please see A&P for additional details of medical decision making.      Data   ------------------------- PAST 24 HR DATA REVIEWED -----------------------------------------------    I have personally reviewed the following data over the past 24 hrs:    7.6  \   15.2   / 329     130 (L) 84 (L) 39.4 (H) /  111 (H)   2.4 (LL) 30 (H) 1.29 (H) \     INR:  2.28 (H) PTT:  N/A   D-dimer:  N/A Fibrinogen:  N/A       Imaging results reviewed over the past 24 hrs:   No results found for this or any previous visit (from the past 24 hours).

## 2025-06-07 NOTE — PROGRESS NOTES
"[id number]    VS: /54 (BP Location: Right arm)   Pulse 85   Temp 98.4  F (36.9  C) (Oral)   Resp 17   Ht 1.803 m (5' 10.98\")   Wt (!) 147.2 kg (324 lb 8.3 oz)   SpO2 100%   BMI 45.28 kg/m     O2: Stable on RA Denies SOB   Output: Continent of bowel and bladder, voids spontaneously w/o difficulty   Last BM: 5/6   Activity: Bedrest weak   Skin: X Scattered Bruises   Pain: Denies              CMS: A&Ox4 denies chest pain, n/v, numbness/tingling, and dizziness   Dressing: none   Diet: Low saturated diet   LDA: PIV SL   Equipment: Personal belongings   Plan: Call light in reach, continue POC   Additional Info:         "

## 2025-06-08 LAB
ANION GAP SERPL CALCULATED.3IONS-SCNC: 13 MMOL/L (ref 7–15)
BUN SERPL-MCNC: 43.6 MG/DL (ref 8–23)
CALCIUM SERPL-MCNC: 8.8 MG/DL (ref 8.8–10.4)
CHLORIDE SERPL-SCNC: 84 MMOL/L (ref 98–107)
CREAT SERPL-MCNC: 1.26 MG/DL (ref 0.67–1.17)
EGFRCR SERPLBLD CKD-EPI 2021: 59 ML/MIN/1.73M2
ERYTHROCYTE [DISTWIDTH] IN BLOOD BY AUTOMATED COUNT: 13.8 % (ref 10–15)
GLUCOSE BLDC GLUCOMTR-MCNC: 143 MG/DL (ref 70–99)
GLUCOSE BLDC GLUCOMTR-MCNC: 151 MG/DL (ref 70–99)
GLUCOSE SERPL-MCNC: 149 MG/DL (ref 70–99)
HCO3 SERPL-SCNC: 31 MMOL/L (ref 22–29)
HCT VFR BLD AUTO: 44.3 % (ref 40–53)
HGB BLD-MCNC: 14.6 G/DL (ref 13.3–17.7)
INR PPP: 2.19 (ref 0.85–1.15)
MAGNESIUM SERPL-MCNC: 1.7 MG/DL (ref 1.7–2.3)
MAGNESIUM SERPL-MCNC: 2 MG/DL (ref 1.7–2.3)
MCH RBC QN AUTO: 27 PG (ref 26.5–33)
MCHC RBC AUTO-ENTMCNC: 33 G/DL (ref 31.5–36.5)
MCV RBC AUTO: 82 FL (ref 78–100)
PHOSPHATE SERPL-MCNC: 2.8 MG/DL (ref 2.5–4.5)
PHOSPHATE SERPL-MCNC: 3.3 MG/DL (ref 2.5–4.5)
PLATELET # BLD AUTO: 330 10E3/UL (ref 150–450)
POTASSIUM SERPL-SCNC: 2.7 MMOL/L (ref 3.4–5.3)
PROTHROMBIN TIME: 24.6 SECONDS (ref 11.8–14.8)
RBC # BLD AUTO: 5.4 10E6/UL (ref 4.4–5.9)
SODIUM SERPL-SCNC: 128 MMOL/L (ref 135–145)
WBC # BLD AUTO: 8.7 10E3/UL (ref 4–11)

## 2025-06-08 PROCEDURE — 84100 ASSAY OF PHOSPHORUS: CPT

## 2025-06-08 PROCEDURE — 36415 COLL VENOUS BLD VENIPUNCTURE: CPT | Performed by: INTERNAL MEDICINE

## 2025-06-08 PROCEDURE — 99232 SBSQ HOSP IP/OBS MODERATE 35: CPT | Mod: GC

## 2025-06-08 PROCEDURE — 80048 BASIC METABOLIC PNL TOTAL CA: CPT

## 2025-06-08 PROCEDURE — 250N000013 HC RX MED GY IP 250 OP 250 PS 637

## 2025-06-08 PROCEDURE — 120N000002 HC R&B MED SURG/OB UMMC

## 2025-06-08 PROCEDURE — 85610 PROTHROMBIN TIME: CPT

## 2025-06-08 PROCEDURE — 36415 COLL VENOUS BLD VENIPUNCTURE: CPT

## 2025-06-08 PROCEDURE — 250N000013 HC RX MED GY IP 250 OP 250 PS 637: Performed by: STUDENT IN AN ORGANIZED HEALTH CARE EDUCATION/TRAINING PROGRAM

## 2025-06-08 PROCEDURE — 83735 ASSAY OF MAGNESIUM: CPT

## 2025-06-08 PROCEDURE — 85014 HEMATOCRIT: CPT

## 2025-06-08 RX ORDER — WARFARIN SODIUM 5 MG/1
10 TABLET ORAL
Status: COMPLETED | OUTPATIENT
Start: 2025-06-08 | End: 2025-06-08

## 2025-06-08 RX ADMIN — WARFARIN SODIUM 10 MG: 5 TABLET ORAL at 18:19

## 2025-06-08 RX ADMIN — ATORVASTATIN CALCIUM 40 MG: 40 TABLET, FILM COATED ORAL at 19:57

## 2025-06-08 RX ADMIN — METFORMIN HYDROCHLORIDE 500 MG: 500 TABLET, FILM COATED ORAL at 18:19

## 2025-06-08 RX ADMIN — POTASSIUM CHLORIDE 40 MEQ: 1500 TABLET, EXTENDED RELEASE ORAL at 19:57

## 2025-06-08 RX ADMIN — SPIRONOLACTONE 50 MG: 50 TABLET, FILM COATED ORAL at 09:32

## 2025-06-08 RX ADMIN — Medication 25 MCG: at 09:32

## 2025-06-08 RX ADMIN — MAGNESIUM OXIDE TAB 400 MG (241.3 MG ELEMENTAL MG) 400 MG: 400 (241.3 MG) TAB at 09:32

## 2025-06-08 RX ADMIN — LEVOTHYROXINE SODIUM 175 MCG: 0.05 TABLET ORAL at 06:36

## 2025-06-08 RX ADMIN — TORSEMIDE 100 MG: 100 TABLET ORAL at 12:10

## 2025-06-08 RX ADMIN — METFORMIN HYDROCHLORIDE 500 MG: 500 TABLET, FILM COATED ORAL at 09:32

## 2025-06-08 RX ADMIN — ASPIRIN 81 MG CHEWABLE TABLET 81 MG: 81 TABLET CHEWABLE at 09:32

## 2025-06-08 RX ADMIN — POTASSIUM CHLORIDE 40 MEQ: 1500 TABLET, EXTENDED RELEASE ORAL at 09:34

## 2025-06-08 ASSESSMENT — ACTIVITIES OF DAILY LIVING (ADL)
ADLS_ACUITY_SCORE: 56

## 2025-06-08 NOTE — PLAN OF CARE
"Goal Outcome Evaluation:    VS: /70 (BP Location: Right arm)   Pulse 58   Temp 98  F (36.7  C) (Oral)   Resp 17   Ht 1.803 m (5' 10.98\")   Wt (!) 145.8 kg (321 lb 6.9 oz)   SpO2 92%   BMI 44.85 kg/m       O2: SPO2>92% and stable on RA. Denies SOB and chest pain    Output: Continent of bowl and bladder. Urinal at the bed side   Last BM: 6/7/2025   Activity: Bedrest weak. Independent repositioning in bed       Skin: X scattered bruises    Pain: Denies pain   CMS: A&OX4   Dressing: NA   Diet: Low saturated diet    LDA: L PIV SL   Equipment: IV pole, personal belongings    Plan: STANDARD precaution is maintained, Call light within reach, bed in a low position.    Additional Info: Pt R PIV removed because of redness. Pt refused CPAP at night. Pt refused continuous pulses OX.                                   "

## 2025-06-08 NOTE — PLAN OF CARE
Goal Outcome Evaluation:      Plan of Care Reviewed With: patient          Outcome Evaluation: Discussed discharge plan. Current plan is for patient to discharge to TCU once medically stable and placement is found.

## 2025-06-08 NOTE — PROGRESS NOTES
"[id number]    VS: /66 (BP Location: Right arm)   Pulse 79   Temp 98.2  F (36.8  C) (Oral)   Resp 18   Ht 1.803 m (5' 10.98\")   Wt (!) 154.5 kg (340 lb 9.8 oz)   SpO2 100%   BMI 47.53 kg/m     O2: Stable on RA Denies SOB   Output: Continent of bowel and bladder, voids spontaneously w/o difficulty   Last BM:    Activity: Assist of 2-3 not out bed   Skin: X Scattered Bruises   Pain: Denies              CMS: A&Ox4, denies chest pain, n/v, numbness/tingling, and dizziness   Dressing: none   Diet: Low fat saturated  NA < 2400   LDA: PIV SL   Equipment: Personal belongings   Plan: Call light in reach, continue POC   Additional Info:         "

## 2025-06-08 NOTE — PROGRESS NOTES
CLINICAL NUTRITION SERVICES - BRIEF NOTE   (See RD note on 6/6 for full assessment)     Reason for RD note:   Acknowledging consult - concern for malnutrition due to decreased PO and chronic illness    New Findings/Chart Review:  RD assessed pt on 6/6, supplements ordered, pt with moderate malnutrition in the context of acute illness per RD note.      Interventions:  None, RD already following     Future/Additional Recommendations:  See note 6/6     Nutrition will continue to follow per protocol.    Fara Bosch MS, RDN, LD  Clinical Dietitian  Available via [employer]  Weekend/Holiday Vocera: Weekend Holiday Clinical Dietitian [Multi Site Groups]

## 2025-06-08 NOTE — CONSULTS
Care Management Initial Consult    General Information  Assessment completed with: Patient,    Type of CM/SW Visit: Initial Assessment    Primary Care Provider verified and updated as needed: Yes   Readmission within the last 30 days: no previous admission in last 30 days      Reason for Consult: discharge planning  Advance Care Planning: Advance Care Planning Reviewed:  (Discussed HCD. Blank copy provided to patient. Weekday SW to offer scheduling of a notary.)          Communication Assessment  Patient's communication style: spoken language (English or Bilingual)    Hearing Difficulty or Deaf: no   Wear Glasses or Blind: yes    Cognitive  Cognitive/Neuro/Behavioral: WDL  Level of Consciousness: alert  Arousal Level: opens eyes spontaneously  Orientation: oriented x 4  Mood/Behavior: calm, cooperative  Best Language: 0 - No aphasia  Speech: clear, spontaneous    Living Environment:   People in home: alone     Current living Arrangements: apartment      Able to return to prior arrangements:  (TCU is currently recommended. Patient hopes to return to apartment after TCU.)  Living Arrangement Comments: Patient resides in apartment on the 16th floor. There is an elevator in his building. He navigates his apartment with a cane and sometimes a walker.    Family/Social Support:  Care provided by: self, friend  Provides care for: no one, unable/limited ability to care for self  Marital Status: Single  Support system: Limited to, Friend          Description of Support System: Supportive, Involved    Support Assessment: Lacks necessary supervision and assistance, Lacks adequate physical care    Current Resources:   Patient receiving home care services: No        Community Resources: OP Mental Health, Transportation Services (Patient receives home delivered meals and food through [employer] and [facility]. He uses [employer] for transportation.)  Equipment currently used at home: walker, standard,  cane, straight, dressing device (reacher)  Supplies currently used at home: Compression Stockings, Oxygen Tubing/Supplies (CPAP with O2 bleed-in)    Employment/Financial:  Employment Status: retired        Financial Concerns:  (No current financial concerns but patient reports that he would not be able to pay for long term care.)   Referral to Financial Worker: No (Not at this time, may become necessary if patient needs LTC.)       Does the patient's insurance plan have a 3 day qualifying hospital stay waiver?  Yes     Which insurance plan 3 day waiver is available? Alternative insurance waiver    Will the waiver be used for post-acute placement? No    Lifestyle & Psychosocial Needs:  Social Drivers of Health     Food Insecurity: Low Risk  (6/6/2025)    Food Insecurity     Within the past 12 months, did you worry that your food would run out before you got money to buy more?: No     Within the past 12 months, did the food you bought just not last and you didn t have money to get more?: No   Depression: Not at risk (3/26/2025)    PHQ-2     PHQ-2 Score: 0   Housing Stability: Low Risk  (6/6/2025)    Housing Stability     Do you have housing? : Yes     Are you worried about losing your housing?: No   Recent Concern: Housing Stability - High Risk (3/26/2025)    Housing Stability     Do you have housing? : Yes     Are you worried about losing your housing?: Yes   Tobacco Use: Low Risk  (5/8/2025)    Patient History     Smoking Tobacco Use: Never     Smokeless Tobacco Use: Never     Passive Exposure: Not on file   Financial Resource Strain: Low Risk  (6/6/2025)    Financial Resource Strain     Within the past 12 months, have you or your family members you live with been unable to get utilities (heat, electricity) when it was really needed?: No   Alcohol Use: Not on file   Transportation Needs: Low Risk  (6/6/2025)    Transportation Needs     Within the past 12 months, has lack of transportation kept you from medical  appointments, getting your medicines, non-medical meetings or appointments, work, or from getting things that you need?: No   Physical Activity: Inactive (3/26/2025)    Exercise Vital Sign     Days of Exercise per Week: 0 days     Minutes of Exercise per Session: 0 min   Interpersonal Safety: Low Risk  (6/6/2025)    Interpersonal Safety     Do you feel physically and emotionally safe where you currently live?: Yes     Within the past 12 months, have you been hit, slapped, kicked or otherwise physically hurt by someone?: No     Within the past 12 months, have you been humiliated or emotionally abused in other ways by your partner or ex-partner?: No   Stress: No Stress Concern Present (3/26/2025)    [employer] - Occupational Stress Questionnaire     Feeling of Stress : Not at all   Social Connections: Unknown (3/26/2025)    Social Connection and Isolation Panel [NHANES]     Frequency of Communication with Friends and Family: Not on file     Frequency of Social Gatherings with Friends and Family: Patient declined     Attends Shinto Services: Not on file     Active Member of Clubs or Organizations: Not on file     Attends Club or Organization Meetings: Not on file     Marital Status: Not on file   Health Literacy: Not on file       Functional Status:  Prior to admission patient needed assistance:   Dependent ADLs:: Ambulation-cane, Ambulation-walker, Bathing  Dependent IADLs:: Cleaning, Cooking, Meal Preparation, Shopping, Transportation  Assesssment of Functional Status: Needs placement in a SNF/TCF for rehabilitation    Mental Health Status:  Mental Health Status: No Current Concerns  Mental Health Management: Individual Therapy (Patient reports that he sees a therapist through [facility])    Chemical Dependency Status:  Chemical Dependency Status: No Current Concerns             Values/Beliefs:  Spiritual, Cultural Beliefs, Shinto Practices, Values that affect care: no          "      Discussed  Partnership in Safe Discharge Planning  document with patient/family: Discussed that we discharge to first accepting facility    Additional Information:    SW met with patient at bedside and introduced self/role. SW completed Initial Assessment. Please see above for assessment information.    SW reviewed demographic information (address, patient phone number, emergency contacts, insurance), no changes necessary.    Patient reports having two recent stays at [facility]. He enjoyed his time there and would be interested in going back. He was discharged home from [facility] in January. He reports that he received help from some friends to organize his apartment and \"make it livable again.\" He admits that while he has friends who can help here or there, they are not able to provide a higher level of assistance.     Discussed recommendation by PT and OT for TCU vs LTC. Patient confirms that he would like to go to TCU. Discussed LTC. He reports that he spoke with a  at [facility] about Assisted Living and how he would need Medical Assistance and an Elderly Waiver. SW explained this further and further explained that this process takes months. Patient did confirm that he would be unable to pay for LTC services and would need MA (and waiver if ERNIE). He maintains that his hope is to return home after TCU. He agrees that detention may be in his longer future but has the motivation to return to apartment after getting his strength back. SW encouraged him to work with therapies as much as he can (safely) so that he shows progress and meets criteria for TCU.  Patient was familiar with Medicare.gov list of TCU facilities and declined it at this time. He sees it as a hospital formality and notes that it is not helpful in choosing facilities. He tells SW that he would like to go to a [facility] facility. SW agreed to send referrals to [facility] and [facility] facilities, but noted that if he is declined, he " will have to expand his preferences.  SW explained that acceptance to TCU facilities depends on many factors, including bed availability/staffing, insurance coverage, and level of care/acuity. SW explained that once patient is medically ready for discharge, patient will be discharged to the first accepting facility. Patient verbalized understanding. SW discussed insurance coverage for TCU. Patient has [facility] MEDICARE for insurance. Discussed that Medicare covers a certain number of days at TCU but would not cover LTC. SW instructed patient to reach out to his particular insurance company for specific coverage information. No further questions at this time.    SW initiated referrals to  TCU and [facility] admissions.    Next Steps:     - Offer to schedule notary to notarize HCD    - Follow up on TCU referrals.    - If PT/OT no longer recommending TCU, will have to revisit conversation about LTC. At this time, patient is not interested in LTC, only TCU.      Once placement is found:   - Complete PAS  - Discuss transportation options (including possible out of pocket costs) and schedule if applicable    On day of discharge:  - Administer IMM  - Send orders to facility        KRYSTAL Porter, LSW  Weekend Covering   [facility] Acute Care Management  Searchable on Vocera: 6 Med Surg MERVAT, 8 Med Surg MERVAT, 10 ICU MERVAT

## 2025-06-08 NOTE — PROGRESS NOTES
[facility]    Progress Note - [facility] Medicine Service       Date of Admission:  6/5/2025    Assessment & Plan   Clarke Moreira is a 75 year old male admitted on 6/5/2025. He has PMH of AF on warfarin, hypothyroidism, T2DM, MDD, HTN, HLD, HFpEF who presented due to 2 weeks of worsening weakness and recent fall.     Main Plans for Today  - Discontinue BG checks and sliding scale insulin  - Mg and Phos tonight, if normal, no more checks  - Restart PTA Kcl 40meq BID    Generalized weakness  Recurrent Falls  Peripheral Neuropathy  Has multiple previous admissions for similar situations (in 10/2024 and 12/2024). After those admissions, he went to TCU and with PT/OT, had improvement in strength. Unfortunately hasn't been able to have follow up with outpatient therapies since his home environment feels unsafe and makes it hard for him to get up or leave the house (as below). He reports over the last two weeks having increased weakness and episodes where he legs give out while he is standing. He had two falls on 5/31 and 6/1. Previously had been able to ambulate with a cane/walker but since these falls, he has hardly been out of bed due to feeling weak. Low PO intake during this time. Imaging on admit with no acute fracture seen on imaging. Suspect falls are likely from combination of deconditioning and muscle weakness, BLE edema (which is currently well managed), and diabetic neuropathy.   -- Continue working with PT/OT   -Discharge recommendation LTC, but TCU appropriate if pt can make gains within IP PT and demonstrate increase in activity tolerance and improve strength.   -- Discharge planning as below    MICHELLE, prerenal, improving  Hyponatremia, resolved  Cr on admit 1.42, baseline appears to be around 1.0. FeUrea indicative of prerenal cause of MICHELLE. Sodium 126 on admission with baseline ~135. Here, has fluctuated between hyponatremia and hypernatremia throughout  admission, which is consistent with his history.  Hypokalemia and hyponatremia likely 2/2 torsemide. Will continue to trend and adjust diuretics if hyponatremia becomes more severe.   -- Holding select PTA meds as below  -- Avoid nephrotoxic drugs as able  -- Daily BMP  -- No current fluid restriction    Moderate malnutrition in the context of acute illness   Significant risk for refeeding syndrome  Reports only having two true meals over the last week prior to admission, otherwise only small amounts of PO intake. Lost 19% of body weight in the last month. Previously got meals with [employer]. At high risk for refeeding syndrome due to extreme weight loss. Potassium critical value 2.4 6/7AM, on replacement protocol. Mg and phos wnl. Given Phos and Mg have been normal, suspicion for RF is low at this point.   -- CMP  -- Mg and Phos tonight, then stop    Unsafe living environment  Discharge planning  As above, home environment was not meeting nutritional nor PT/OT therapeutic needs after past TCU stay. Reports that his floor is covered in dirt and urine. Has had success with prior therapies at TCU but then returns home and is unable to maintain progress or his desired level of function. Anticipate will need to work closely and creatively with care coordination for finding a safe option for discharge. Patient would like to go to TCU and afterward receive home care w/ assistance with ADLs.  -- SW to facilitate dispo planning    Abdominal pain, left lateral, resolved  Constipation   Patient notes some left sided abdominal pain over the last 10 days, feels like he is constipated. Did have a small BM 6/5 but none a week prior. Notes worse constipation related to his limited mobility and being in bed more often. CT A/P on admit with cholelithiasis without cholecystitis No RUQ tenderness. CT with BL renal stones that are in the lower poles, nonobstructive.appears to also have moderate-large colonic stool burden without  "signs of obstruction. Some mild TTP on exam, no acute abdomen.  -- Miralax  BID  -- Senakot BID  -- Monitor symptoms for improvement; if worsening or become more colicky in nature, may be related to known cholelithiasis though currently low suspicion     HFpEF without current exacerbation  Chronic AF on Warfarin  HTN  HLD  Chronic BLE Edema, stable  Most recent Echo from 2024 with EF 55-60%. He was seen in in HFpEF clinic on 5/7/25 where edema and weakness was discussed. His BB was discontinued and he was started on Chlorthalidone. Semaglutide, SGLT2i, and Diltiazem were possible options going forward (patient previously declined starting SGLT2i in 3/2024 due to feeling well). On admit, troponin mildly elevated at 33 -> 32,  (improved from 1044 on 5/7/25). EKG with rate controlled AF. BLE edema currently better than it had been per patient, improved since cardiology adjusted medications. Reports some \"labored\" breathing more so when he lays flat, sounding consistent with orthopnea; he sleeps with HOB at 30 degrees at baseline. No current concern for exacerbation, overall dry on exam. No weight gain. His orthopnea and breathing are improved.   -- HOLD PTA Chlorthalidone 25mg daily in setting of acute MICHELLE  -- PTA Potassium 40meq BID   -- PTA Mg oxide 400mg daily  -- PTA Torsemide 100mg  -- PTA Spironolactone 50mg daily   -- PTA ASA 81 daily  -- PTA Atorvastatin 40mg daily  -- Pharmacy to dose warfarin  -- PT/OT  -- Compression stockings      T2DM  Peripheral Neuropathy  A1C on 3/26/25 6.2, stable compared to previous years trends. Home regimen includes Metformin 500mg BID. Was prescribed Ozempic but has yet to start it, wanting to wait until follow up appt with PCP in June. Patient has noted longstanding BLE neuropathy (numbness and pain to light touch) that he feels is getting worse and may be contributing to recent falls. He follows with podiatry and was seen on 5/21/25 with no active ulcerations and " sensations diminished bilaterally. Planning to see again in 12 weeks. Restarted metformin and stopped sliding scale insulin as he was not needing it.   -- PTA Metformin   -- Hypoglycemia protocol     Hypothyroidism  TSH on admit 0.48. WNL in 10/2024. Has been on 175mcg Synthroid daily.   -- Continue PTA Synthroid 175mcg daily      MDD  Follows with therapist outpatient. No chronic medications  -- Noted           Diet: Snacks/Supplements Adult: Ensure Max Protein (bariatric); With Meals  Combination Diet Regular Diet; Low Saturated Fat Na <2400mg Diet    DVT Prophylaxis: Warfarin  Bazzi Catheter: Not present  Fluids: PO  Lines: None     Cardiac Monitoring: None  Code Status: No CPR- Do NOT Intubate      Clinically Significant Risk Factors        # Hypokalemia: Lowest K = 2.4 mmol/L in last 2 days, will replace as needed  # Hyponatremia: Lowest Na = 128 mmol/L in last 2 days, will monitor as appropriate  # Hypochloremia: Lowest Cl = 84 mmol/L in last 2 days, will monitor as appropriate          # Hypertension: Noted on problem list    # Chronic heart failure with preserved ejection fraction: heart failure noted on problem list and last echo with EF >50%           # Moderate Malnutrition: based on nutrition assessment and treatment provided per dietitian's recommendations., PRESENT ON ADMISSION     # Financial/Environmental Concerns:           Social Drivers of Health   Housing Stability: Low Risk  (6/6/2025)    Housing Stability     Do you have housing? : Yes     Are you worried about losing your housing?: No   Recent Concern: Housing Stability - High Risk (3/26/2025)    Housing Stability     Do you have housing? : Yes     Are you worried about losing your housing?: Yes   Physical Activity: Inactive (3/26/2025)    Exercise Vital Sign     Days of Exercise per Week: 0 days     Minutes of Exercise per Session: 0 min   Social Connections: Unknown (3/26/2025)    Social Connection and Isolation Panel [NHANES]     Frequency  of Social Gatherings with Friends and Family: Patient declined         Disposition Plan     Medically Ready for Discharge: Anticipated in 2-4 Days         The patient's care was discussed with the Attending Physician, Dr. George.   Anneliese Auguste MD      [facility]  [facility]  Securely message with [employer] info)  Text page via [employer] Paging/Directory   See signed in provider for up to date coverage information  ______________________________________________________________________    Interval History   No acute events overnight  Concerned about diet being changed from low-fat and sodium to regular diet.  Concerned about insulin being given last night, requested that it be held.   We discussed that we can change the diet back to low-fat sodium.  Also, we can stop sliding scale insulin as that was the only time that he really needed it.   Other than that, he is feeling okay.  His orthopnea is improved.  His lower extremity swelling is improved.  He would like to work with physical therapy to become independent with a wheelchair in his room.  He would like to be able to shower or get cleaned up today.     Physical Exam   Vital Signs: Temp: 97.8  F (36.6  C) Temp src: Oral BP: 117/66 Pulse: 64   Resp: 17 SpO2: 98 % O2 Device: None (Room air)    Weight: 340 lbs 9.77 oz    Physical Exam  Vitals reviewed.   Constitutional:       General: He is not in acute distress.     Appearance: Normal appearance. He is not ill-appearing.   HENT:      Head: Normocephalic and atraumatic.   Eyes:      Conjunctiva/sclera: Conjunctivae normal.   Cardiovascular:      Rate and Rhythm: Normal rate.      Heart sounds: No murmur heard.  Pulmonary:      Effort: Pulmonary effort is normal. No respiratory distress.      Breath sounds: Normal breath sounds. No wheezing or rales.   Musculoskeletal:      Right lower leg: Edema present.      Left lower leg: Edema present.       Comments: Trace pitting to mid-calf   Neurological:      General: No focal deficit present.      Mental Status: He is alert.   Psychiatric:         Behavior: Behavior normal.         Thought Content: Thought content normal.           Medical Decision Making       Please see A&P for additional details of medical decision making.      Data   ------------------------- PAST 24 HR DATA REVIEWED -----------------------------------------------    I have personally reviewed the following data over the past 24 hrs:    8.7  \   14.6   / 330     128 (L) 84 (L) 43.6 (H) /  149 (H)   2.7 (L) 31 (H) 1.26 (H) \     INR:  2.19 (H) PTT:  N/A   D-dimer:  N/A Fibrinogen:  N/A       Imaging results reviewed over the past 24 hrs:   No results found for this or any previous visit (from the past 24 hours).

## 2025-06-09 ENCOUNTER — APPOINTMENT (OUTPATIENT)
Dept: PHYSICAL THERAPY | Facility: CLINIC | Age: 75
End: 2025-06-09
Payer: COMMERCIAL

## 2025-06-09 LAB
ANION GAP SERPL CALCULATED.3IONS-SCNC: 14 MMOL/L (ref 7–15)
BUN SERPL-MCNC: 42.9 MG/DL (ref 8–23)
CALCIUM SERPL-MCNC: 9.3 MG/DL (ref 8.8–10.4)
CHLORIDE SERPL-SCNC: 89 MMOL/L (ref 98–107)
CREAT SERPL-MCNC: 1.26 MG/DL (ref 0.67–1.17)
EGFRCR SERPLBLD CKD-EPI 2021: 59 ML/MIN/1.73M2
ERYTHROCYTE [DISTWIDTH] IN BLOOD BY AUTOMATED COUNT: 13.8 % (ref 10–15)
GLUCOSE BLDC GLUCOMTR-MCNC: 147 MG/DL (ref 70–99)
GLUCOSE SERPL-MCNC: 143 MG/DL (ref 70–99)
HCO3 SERPL-SCNC: 29 MMOL/L (ref 22–29)
HCT VFR BLD AUTO: 43.9 % (ref 40–53)
HGB BLD-MCNC: 14.4 G/DL (ref 13.3–17.7)
INR PPP: 1.99 (ref 0.85–1.15)
MCH RBC QN AUTO: 27.1 PG (ref 26.5–33)
MCHC RBC AUTO-ENTMCNC: 32.8 G/DL (ref 31.5–36.5)
MCV RBC AUTO: 83 FL (ref 78–100)
PLATELET # BLD AUTO: 336 10E3/UL (ref 150–450)
POTASSIUM SERPL-SCNC: 3.9 MMOL/L (ref 3.4–5.3)
PROTHROMBIN TIME: 22.9 SECONDS (ref 11.8–14.8)
RBC # BLD AUTO: 5.32 10E6/UL (ref 4.4–5.9)
SODIUM SERPL-SCNC: 132 MMOL/L (ref 135–145)
WBC # BLD AUTO: 7.8 10E3/UL (ref 4–11)

## 2025-06-09 PROCEDURE — 80048 BASIC METABOLIC PNL TOTAL CA: CPT

## 2025-06-09 PROCEDURE — 250N000013 HC RX MED GY IP 250 OP 250 PS 637

## 2025-06-09 PROCEDURE — 250N000013 HC RX MED GY IP 250 OP 250 PS 637: Performed by: INTERNAL MEDICINE

## 2025-06-09 PROCEDURE — 85610 PROTHROMBIN TIME: CPT

## 2025-06-09 PROCEDURE — 97116 GAIT TRAINING THERAPY: CPT | Mod: GP

## 2025-06-09 PROCEDURE — 97530 THERAPEUTIC ACTIVITIES: CPT | Mod: GP

## 2025-06-09 PROCEDURE — 120N000002 HC R&B MED SURG/OB UMMC

## 2025-06-09 PROCEDURE — 99232 SBSQ HOSP IP/OBS MODERATE 35: CPT | Mod: GC

## 2025-06-09 PROCEDURE — 250N000013 HC RX MED GY IP 250 OP 250 PS 637: Performed by: FAMILY MEDICINE

## 2025-06-09 PROCEDURE — 36415 COLL VENOUS BLD VENIPUNCTURE: CPT

## 2025-06-09 PROCEDURE — 85027 COMPLETE CBC AUTOMATED: CPT

## 2025-06-09 RX ORDER — WARFARIN SODIUM 5 MG/1
10 TABLET ORAL
Status: COMPLETED | OUTPATIENT
Start: 2025-06-09 | End: 2025-06-09

## 2025-06-09 RX ORDER — AMOXICILLIN 250 MG
2 CAPSULE ORAL 2 TIMES DAILY
Status: DISCONTINUED | OUTPATIENT
Start: 2025-06-09 | End: 2025-06-12

## 2025-06-09 RX ORDER — AMOXICILLIN 250 MG
1 CAPSULE ORAL 2 TIMES DAILY
Status: DISCONTINUED | OUTPATIENT
Start: 2025-06-09 | End: 2025-06-12

## 2025-06-09 RX ADMIN — POLYETHYLENE GLYCOL 3350 17 G: 17 POWDER, FOR SOLUTION ORAL at 08:03

## 2025-06-09 RX ADMIN — TORSEMIDE 100 MG: 100 TABLET ORAL at 12:24

## 2025-06-09 RX ADMIN — SPIRONOLACTONE 50 MG: 50 TABLET, FILM COATED ORAL at 08:02

## 2025-06-09 RX ADMIN — LEVOTHYROXINE SODIUM 175 MCG: 0.05 TABLET ORAL at 08:02

## 2025-06-09 RX ADMIN — METFORMIN HYDROCHLORIDE 500 MG: 500 TABLET, FILM COATED ORAL at 17:43

## 2025-06-09 RX ADMIN — METFORMIN HYDROCHLORIDE 500 MG: 500 TABLET, FILM COATED ORAL at 08:02

## 2025-06-09 RX ADMIN — MAGNESIUM OXIDE TAB 400 MG (241.3 MG ELEMENTAL MG) 400 MG: 400 (241.3 MG) TAB at 08:02

## 2025-06-09 RX ADMIN — ATORVASTATIN CALCIUM 40 MG: 40 TABLET, FILM COATED ORAL at 21:08

## 2025-06-09 RX ADMIN — ASPIRIN 81 MG CHEWABLE TABLET 81 MG: 81 TABLET CHEWABLE at 08:02

## 2025-06-09 RX ADMIN — POTASSIUM CHLORIDE 40 MEQ: 1500 TABLET, EXTENDED RELEASE ORAL at 08:02

## 2025-06-09 RX ADMIN — POLYETHYLENE GLYCOL 3350 17 G: 17 POWDER, FOR SOLUTION ORAL at 21:07

## 2025-06-09 RX ADMIN — POTASSIUM CHLORIDE 40 MEQ: 1500 TABLET, EXTENDED RELEASE ORAL at 21:08

## 2025-06-09 RX ADMIN — Medication 25 MCG: at 08:02

## 2025-06-09 RX ADMIN — WARFARIN SODIUM 10 MG: 5 TABLET ORAL at 17:43

## 2025-06-09 ASSESSMENT — ACTIVITIES OF DAILY LIVING (ADL)
ADLS_ACUITY_SCORE: 55
ADLS_ACUITY_SCORE: 56
ADLS_ACUITY_SCORE: 55
ADLS_ACUITY_SCORE: 56
ADLS_ACUITY_SCORE: 55
ADLS_ACUITY_SCORE: 55
ADLS_ACUITY_SCORE: 56
ADLS_ACUITY_SCORE: 55
ADLS_ACUITY_SCORE: 56
ADLS_ACUITY_SCORE: 55

## 2025-06-09 NOTE — PLAN OF CARE
Goal Outcome Evaluation:    Plan of Care Reviewed With: patient    Overall Patient Progress: no change    Alert and oriented x4    Not OOB. Independent in repositioning    L PIV SL    Low saturated fat NA <2400 mg diet    Continent - LBM 6/7    No acute events overnight    Call light within reach, will continue to monitor and follow POC

## 2025-06-09 NOTE — PROGRESS NOTES
[facility]    Progress Note - [facility] Medicine Service       Date of Admission:  6/5/2025    Assessment & Plan   Clarke Moreira is a 75 year old male admitted on 6/5/2025. He has PMH of AF on warfarin, hypothyroidism, T2DM, MDD, HTN, HLD, HFpEF who presented due to 2 weeks of worsening weakness and recent fall.     Main Plans for Today  - Continue working with PT/OT   - SW/CC to work on dispo (TCU VS longterm)  - Continue working on constipation with bowel regimen       #Generalized weakness  #Recurrent Falls  #Peripheral Neuropathy  Has multiple previous admissions for similar situations (in 10/2024 and 12/2024). After those admissions, he went to TCU and with PT/OT, had improvement in strength. Unfortunately hasn't been able to have follow up with outpatient therapies since his home environment feels unsafe and makes it hard for him to get up or leave the house (as below). He reports over the last two weeks having increased weakness and episodes where he legs give out while he is standing. He had two falls on 5/31 and 6/1. Previously had been able to ambulate with a cane/walker but since these falls, he has hardly been out of bed due to feeling weak. Low PO intake during this time. Imaging on admit with no acute fracture seen on imaging. Suspect falls are likely from combination of deconditioning and muscle weakness, BLE edema (which is currently well managed), and diabetic neuropathy.   -- Continue working with PT/OT   -Discharge recommendation LTC, but TCU appropriate if pt can make gains within IP PT and demonstrate increase in activity tolerance and improve strength.   -- Discharge planning as below    #MICHELLE, prerenal, improving  #Hyponatremia, resolved  #Hypokalemia, resolved   Cr on admit 1.42, baseline appears to be around 1.0. FeUrea indicative of prerenal cause of MICHELLE. Sodium 126 on admission with baseline ~135. Here, has fluctuated between hyponatremia  and hypernatremia throughout admission, which is consistent with his history.  Hypokalemia and hyponatremia likely 2/2 torsemide. Will continue to trend and adjust diuretics if hyponatremia becomes more severe.   -- Holding select PTA meds as below  -- Avoid nephrotoxic drugs as able  -- Daily BMP  -- No current fluid restriction    #Moderate malnutrition in the context of acute illness   #Significant risk for refeeding syndrome  Reports only having two true meals over the last week prior to admission, otherwise only small amounts of PO intake. Lost 19% of body weight in the last month. Previously got meals with [employer]. At high risk for refeeding syndrome due to extreme weight loss. Potassium critical value 2.4 6/7AM, on replacement protocol. Mg and phos wnl. Given Phos and Mg have been normal, suspicion for RF is low at this point.   -- CMP  -- Discontinue daily Mg and Phos     #Unsafe living environment  #Discharge planning  As above, home environment was not meeting nutritional nor PT/OT therapeutic needs after past TCU stay. Reports that his floor is covered in dirt and urine. Has had success with prior therapies at TCU but then returns home and is unable to maintain progress or his desired level of function. Anticipate will need to work closely and creatively with care coordination for finding a safe option for discharge. Patient would like to go to TCU and afterward receive home care w/ assistance with ADLs.  -- SW to facilitate dispo planning    #Abdominal pain, left lateral, improving   #Constipation   Patient notes some left sided abdominal pain over the last 10 days, feels like he is constipated. Did have a small BM 6/5 and 6/9 but none a week prior. Notes worse constipation related to his limited mobility and being in bed more often. CT A/P on admit with cholelithiasis without cholecystitis No RUQ tenderness. CT with BL renal stones that are in the lower poles, nonobstructive.appears to also have  "moderate-large colonic stool burden without signs of obstruction. Some mild TTP on exam, no acute abdomen.  -- Miralax  BID  -- Senakot BID  -- Monitor symptoms for improvement; if worsening or become more colicky in nature, may be related to known cholelithiasis though currently low suspicion     #HFpEF without current exacerbation  #Chronic AF on Warfarin  #HTN  #HLD  #Chronic BLE Edema, stable  Most recent Echo from 2024 with EF 55-60%. He was seen in in HFpEF clinic on 5/7/25 where edema and weakness was discussed. His BB was discontinued and he was started on Chlorthalidone. Semaglutide, SGLT2i, and Diltiazem were possible options going forward (patient previously declined starting SGLT2i in 3/2024 due to feeling well). On admit, troponin mildly elevated at 33 -> 32,  (improved from 1044 on 5/7/25). EKG with rate controlled AF. BLE edema currently better than it had been per patient, improved since cardiology adjusted medications. Reports some \"labored\" breathing more so when he lays flat, sounding consistent with orthopnea; he sleeps with HOB at 30 degrees at baseline. No current concern for exacerbation, overall dry on exam. No weight gain. His orthopnea and breathing are improved.   -- HOLD PTA Chlorthalidone 25mg daily in setting of acute MICHELLE  -- PTA Potassium 40meq BID   -- PTA Mg oxide 400mg daily  -- PTA Torsemide 100mg  -- PTA Spironolactone 50mg daily   -- PTA ASA 81 daily  -- PTA Atorvastatin 40mg daily  -- Pharmacy to dose warfarin  -- PT/OT  -- Compression stockings      #T2DM  #Peripheral Neuropathy  A1C on 3/26/25 6.2, stable compared to previous years trends. Home regimen includes Metformin 500mg BID. Was prescribed Ozempic but has yet to start it, wanting to wait until follow up appt with PCP in June. Patient has noted longstanding BLE neuropathy (numbness and pain to light touch) that he feels is getting worse and may be contributing to recent falls. He follows with podiatry and was seen " on 5/21/25 with no active ulcerations and sensations diminished bilaterally. Planning to see again in 12 weeks. Restarted metformin and stopped sliding scale insulin as he was not needing it.   -- PTA Metformin   -- Hypoglycemia protocol     #Hypothyroidism  TSH on admit 0.48. WNL in 10/2024. Has been on 175mcg Synthroid daily.   -- Continue PTA Synthroid 175mcg daily      #MDD  Follows with therapist outpatient. No chronic medications. Plans to follow up with Dr. Buenrostro outpatient.            Diet: Snacks/Supplements Adult: Ensure Max Protein (bariatric); With Meals  Combination Diet Regular Diet; Low Saturated Fat Na <2400mg Diet    DVT Prophylaxis: Warfarin  Bazzi Catheter: Not present  Fluids: PO  Lines: None     Cardiac Monitoring: None  Code Status: No CPR- Do NOT Intubate      Clinically Significant Risk Factors        # Hypokalemia: Lowest K = 2.7 mmol/L in last 2 days, will replace as needed  # Hyponatremia: Lowest Na = 128 mmol/L in last 2 days, will monitor as appropriate  # Hypochloremia: Lowest Cl = 84 mmol/L in last 2 days, will monitor as appropriate          # Hypertension: Noted on problem list    # Chronic heart failure with preserved ejection fraction: heart failure noted on problem list and last echo with EF >50%           # Moderate Malnutrition: based on nutrition assessment and treatment provided per dietitian's recommendations., PRESENT ON ADMISSION     # Financial/Environmental Concerns:           Social Drivers of Health   Housing Stability: Low Risk  (6/6/2025)    Housing Stability     Do you have housing? : Yes     Are you worried about losing your housing?: No   Recent Concern: Housing Stability - High Risk (3/26/2025)    Housing Stability     Do you have housing? : Yes     Are you worried about losing your housing?: Yes   Physical Activity: Inactive (3/26/2025)    Exercise Vital Sign     Days of Exercise per Week: 0 days     Minutes of Exercise per Session: 0 min   Social  Connections: Unknown (3/26/2025)    Social Connection and Isolation Panel [NHANES]     Frequency of Social Gatherings with Friends and Family: Patient declined         Disposition Plan     Medically Ready for Discharge: Anticipated in 2-4 Days         The patient's care was discussed with the Attending Physician, Dr. Barragan.   Shahab Kearns MD      [facility]  [facility]  Securely message with [employer] (more info)  Text page via [employer] Paging/Directory   See signed in provider for up to date coverage information  ______________________________________________________________________    Interval History   NAOE. No major concerns but reports he is= aware that he needs to be more respectful to the nursing staff. He mentions the need to feel in control of his care and when this is not the case he will often withdrawal himself and push people away. He understands that everyone in the hospital is only trying to help him. He states that he has been working with Dr. Buenrostro and will continue to work with her after this hospitalization. Patient reports one small bowel movement this morning.         Physical Exam   Vital Signs: Temp: 97.9  F (36.6  C) Temp src: Oral BP: 94/67 Pulse: 75   Resp: 18 SpO2: 99 % O2 Device: None (Room air)    Weight: 340 lbs 9.77 oz    Physical Exam  Vitals reviewed.   Constitutional:       General: He is not in acute distress.     Appearance: Normal appearance. He is not ill-appearing.   HENT:      Head: Normocephalic and atraumatic.   Eyes:      Conjunctiva/sclera: Conjunctivae normal.   Cardiovascular:      Rate and Rhythm: Normal rate.      Heart sounds: No murmur heard.  Pulmonary:      Effort: Pulmonary effort is normal. No respiratory distress.      Breath sounds: Normal breath sounds. No wheezing or rales.   Abdominal:      Tenderness: There is abdominal tenderness. There is no guarding or rebound.   Musculoskeletal:       Right lower leg: Edema present.      Left lower leg: Edema present.      Comments: Trace pitting to mid-calf   Neurological:      General: No focal deficit present.      Mental Status: He is alert.   Psychiatric:         Behavior: Behavior normal.         Thought Content: Thought content normal.           Medical Decision Making       Please see A&P for additional details of medical decision making.      Data   ------------------------- PAST 24 HR DATA REVIEWED -----------------------------------------------    I have personally reviewed the following data over the past 24 hrs:    7.8  \   14.4   / 336     132 (L) 89 (L) 42.9 (H) /  147 (H)   3.9 29 1.26 (H) \     INR:  1.99 (H) PTT:  N/A   D-dimer:  N/A Fibrinogen:  N/A       Imaging results reviewed over the past 24 hrs:   No results found for this or any previous visit (from the past 24 hours).

## 2025-06-09 NOTE — PROGRESS NOTES
"[id number]    VS: BP 94/67 (BP Location: Right arm)   Pulse 60   Temp 98.2  F (36.8  C) (Oral)   Resp 18   Ht 1.803 m (5' 10.98\")   Wt (!) 154.5 kg (340 lb 9.8 oz)   SpO2 99%   BMI 47.53 kg/m     O2: Stable on RA Denies SOB   Output: Continent of bowel and bladder, voids spontaneously w/o difficulty   Last BM: 6/9   Activity: Ass2-3 with walker and gait belt   Skin: X Scattered Bruses   Pain:               CMS: A&Ox4, denies chest pain, n/v, numbness/tingling, and dizziness   Dressing: none   Diet: Low saturated fat   LDA: PIV SL   Equipment: Personal belongings   Plan: Call light in reach, continue POC   Additional Info:         "

## 2025-06-09 NOTE — DISCHARGE SUMMARY
"***Change date of service    [facility]  Discharge Summary - Medicine & Pediatrics       Date of Admission:  6/5/2025  Date of Discharge:  {DISCHARGE DATE:732840}  Discharging Provider: ***  Discharge Service: [facility]    Discharge Diagnoses   #Generalized weakness, improving  #Recurrent Falls  #Peripheral Neuropathy  #Unsafe living environment  #MICHELLE, prerenal, resolved  #Hyponatremia, stable  #Hypokalemia, resolved  #Moderate malnutrition in the context of acute illness   #Significant risk for refeeding syndrome, resolved  #Abdominal pain, left lateral, resolved  #Constipation, resolved  #HFpEF without current exacerbation  #Chronic AF on Warfarin  #HTN  #HLD  #Chronic BLE Edema, stable  #T2DM  #Peripheral Neuropathy  #Hypothyroidism   #MDD     Clinically Significant Risk Factors     # Morbid Obesity: Estimated body mass index is 45 kg/m  as calculated from the following:    Height as of this encounter: 1.803 m (5' 10.98\").    Weight as of this encounter: 146.3 kg (322 lb 8.5 oz).    # Moderate Malnutrition: based on nutrition assessment and treatment provided per dietitian's recommendations.      Follow-ups Needed After Discharge   {Additional important follow-up instructions/to-do's for PCP      ;***}  PCP appt 6/16:     Discharge Disposition   Discharged to rehabilitation facility  Condition at discharge: Stable    Hospital Course   Clarke Moreira is a 75 year old male admitted on 6/5/2025. He has PMH of AF on warfarin, hypothyroidism, T2DM, MDD, HTN, HLD, HFpEF who presented due to 2 weeks of worsening weakness and two recent falls.        #Generalized weakness  #Recurrent Falls  #Peripheral Neuropathy  Has multiple previous admissions for similar situations (in 10/2024 and 12/2024). After those admissions, he went to TCU and with PT/OT, had improvement in strength. Unfortunately, he wasn't been able to follow up outpatient therapies " since his home environment felt unsafe and made it hard for him to get up or leave the house. Presented with two weeks of increased weakness and two recent falls in late May/early June, causing him to be essentially bed-bound. No acute fractures found on admission. Suspect falls are likely from combination of deconditioning and muscle weakness, BLE edema (currently well managed), and diabetic neuropathy. PT/OT evaluated Clarke and recommended TCU. He was showing progress with PT exercises prior to discharging to the TCU.    #Unsafe living environment  #Discharge planning  As above, home environment was not meeting nutritional nor PT/OT therapeutic needs after past TCU stay. Reports that his floor is covered in dirt and urine. Has had success with prior therapies at TCU but then returns home and is unable to maintain progress or his desired level of function. Anticipate will need to work closely and creatively with care coordination for finding a safe option for discharge. Patient would like to go to TCU and afterward receive home care w/ assistance with ADLs.    #MICHELLE, prerenal, improving  #Hyponatremia, resolved  #Hypokalemia, resolved   Cr on admit 1.42, baseline appears to be around 1.0. FeUrea indicative of prerenal cause of MICHELLE. Sodium 126 on admission with baseline ~135. Here, has fluctuated between hyponatremia and hypernatremia throughout admission, which is consistent with his history. Hypokalemia and hyponatremia likely 2/2 torsemide.   - F/U Cardiology outpatient   - Continue PTA torsemide 100 mg daily  - HOLDING PTA chlorthalidone (BP softer even without this med, and his edema is well controlled)   - if increased swelling or increased BP, consider restarting    #Moderate malnutrition in the context of acute illness   Reported only having two true meals in the week prior to admission, otherwise only small amounts of PO intake. Lost 19% of body weight in the last month. Previously got meals with [employer]  [facility]. Considered high risk for refeeding syndrome due to extreme weight loss, so electrolytes were replaced while he resumed a diet. Electrolytes stable for many days prior to discharge.     #Abdominal pain, left lateral, resolved  #Constipation, resolved  Presented with 1.5 weeks abdominal pain, which patient suspected was 2/2 constipation. CT A/P on admit showed cholelithiasis without cholecystitis, non-obstructive nephrolithiasis and moderate-large colonic stool burden without signs of obstruction. Since admission has had multiple bowel movements with improvement to his abdominal pain. Continue Miralax and senna BID PRN.     #HFpEF without current exacerbation  #Chronic AF on Warfarin  #HTN  #HLD  #Chronic BLE Edema, stable  Most recent Echo from 2024 with EF 55-60%. He was seen in HFpEF clinic on 5/7/25 where edema and weakness was discussed. His BB was discontinued and he was started on Chlorthalidone. Semaglutide, SGLT2i, and Diltiazem were possible options going forward (patient previously declined starting SGLT2i in 3/2024). On admit, troponin mildly elevated at 33 -> 32,  (improved from 1044 on 5/7/25). EKG with rate controlled AF. BLE edema currently better than it had been per patient, improved since cardiology adjusted medications. No current concern for exacerbation, overall dry on exam.     -- Follow up with Cardiology outpatient   -- HOLDING PTA Chlorthalidone 25mg daily  -- PTA Potassium 40meq BID   -- PTA Mg oxide 400mg daily  -- PTA Torsemide 100mg  -- PTA Spironolactone 50mg daily   -- PTA ASA 81 daily  -- PTA Atorvastatin 40mg daily  -- PTA Compression stockings      #T2DM  #Peripheral Neuropathy  A1C on 3/26/25 6.2, stable. Home regimen is Metformin 500mg BID. Was prescribed Ozempic but has yet to start it, wanting to wait until follow up appt with PCP in June. He follows with podiatry and was seen on 5/21/25 with no active ulcerations and sensations diminished bilaterally.    -- PTA  Metformin   -- Follow up podiatry outpatient      #Hypothyroidism  TSH on admit 0.48. WNL in 10/2024.  -- Continue PTA Synthroid 175mcg daily      #MDD  Follows with therapist outpatient. No chronic medications. Plans to follow up with Dr. Buenrostro at [facility], next appt 6/19.        Consultations This Hospital Stay   PHARMACY TO DOSE WARFARIN  PHYSICAL THERAPY ADULT IP CONSULT  OCCUPATIONAL THERAPY ADULT IP CONSULT  CARE MANAGEMENT / SOCIAL WORK IP CONSULT  CARE MANAGEMENT / SOCIAL WORK IP CONSULT  NUTRITION SERVICES ADULT IP CONSULT    Code Status   No CPR- Do NOT Intubate       The patient was discussed with  ***.     Hari Dickson MD  [facility] MED SURG  Formerly [facility], later [facility] [address]  Phone: [phone]  Fax: [phone]  ______________________________________________________________________    Physical Exam   Vital Signs: Temp: 97.8  F (36.6  C) Temp src: Oral BP: 104/65 Pulse: 87   Resp: 16 SpO2: 100 % O2 Device: None (Room air)    Weight: 322 lbs 8.53 oz  {Recommend personal SmartPhrase or Notewriter for exam (OPTIONAL)   :629586}      Primary Care Physician   Henrry Shepard    Discharge Orders   No discharge procedures on file.    Significant Results and Procedures   {Data for Discharge Summary:170735}    Discharge Medications   Current Discharge Medication List        CONTINUE these medications which have NOT CHANGED    Details   aspirin (ASA) 81 MG chewable tablet Take 1 tablet (81 mg) by mouth daily.  Qty: 200 tablet, Refills: 2    Associated Diagnoses: Essential hypertension      atorvastatin (LIPITOR) 40 MG tablet Take 1 tablet (40 mg) by mouth daily  Qty: 90 tablet, Refills: 3    Associated Diagnoses: Type 2 diabetes mellitus without complication, without long-term current use of insulin (H)      CERTAVITE/ANTIOXIDANTS tablet TAKE ONE TABLET BY MOUTH ONE TIME DAILY  Qty: 100 tablet, Refills: 0    Associated Diagnoses: Type 2 diabetes  mellitus without complication, without long-term current use of insulin (H)      chlorthalidone (HYGROTON) 25 MG tablet Take 1 tablet (25 mg) by mouth daily.  Qty: 90 tablet, Refills: 11    Associated Diagnoses: Chronic heart failure with preserved ejection fraction (H); Hypertensive heart disease with heart failure (H)      FEROSUL 325 (65 Fe) MG tablet Take 1 tablet (325 mg) by mouth every other day. For iron deficiency anemia  Qty: 60 tablet, Refills: 0    Associated Diagnoses: Anemia, unspecified type      levothyroxine (SYNTHROID/LEVOTHROID) 175 MCG tablet Take 1 tablet (175 mcg) by mouth every morning (before breakfast)  Qty: 90 tablet, Refills: 0    Associated Diagnoses: Hypothyroidism, unspecified type      magnesium oxide (MAG-OX) 400 MG tablet Take 1 tablet (400 mg) by mouth daily.  Qty: 90 tablet, Refills: 3    Associated Diagnoses: Nutritional deficiency      metFORMIN (GLUCOPHAGE) 500 MG tablet Take 1 tablet (500 mg) by mouth 2 times daily (with meals).  Qty: 180 tablet, Refills: 3    Associated Diagnoses: Type 2 diabetes mellitus with stage 3b chronic kidney disease, without long-term current use of insulin (H)      Omega-3 Fatty Acids (EQL FISH OIL) 1000 MG CAPS Take 1 capsule by mouth daily.  Qty: 90 capsule, Refills: 0    Associated Diagnoses: Hyperlipidemia LDL goal <100      potassium chloride chen ER (KLOR-CON M20) 20 MEQ CR tablet Take 2 tablets (40 mEq) by mouth 2 times daily.  Qty: 360 tablet, Refills: 4    Associated Diagnoses: Chronic heart failure with preserved ejection fraction (H)      senna-docusate (SENEXON-S) 8.6-50 MG tablet Take 1 to 2 tablets by mouth 2 times daily as needed for constipation  Qty: 180 tablet, Refills: 0    Associated Diagnoses: Constipation, unspecified constipation type      spironolactone (ALDACTONE) 50 MG tablet Take 1 tablet (50 mg) by mouth daily.    Associated Diagnoses: Chronic heart failure with preserved ejection fraction (H)      torsemide (DEMADEX) 20 MG  tablet Take 4 tablets (80 mg) by mouth daily as needed (weight gain, swelling, shortness of breath).  Qty: 360 tablet, Refills: 4    Associated Diagnoses: Chronic heart failure with preserved ejection fraction (H); Hypertensive heart disease with heart failure (H)      urea (CARMOL) 10 % external lotion Apply topically 2 times daily as needed for dry skin.    Associated Diagnoses: Adequate nutrition      vitamin C (ASCORBIC ACID) 1000 MG TABS Take 1,000 mg by mouth daily      Vitamin D, Cholecalciferol, 25 MCG (1000 UT) CAPS Take 1 capsule by mouth daily.      warfarin ANTICOAGULANT (COUMADIN) 3 MG tablet TAKE THREE TABLETS BY MOUTH AT BEDTIME  Qty: 90 tablet, Refills: 0    Associated Diagnoses: Chronic atrial fibrillation (H); Long term (current) use of anticoagulants; Paroxysmal atrial fibrillation (H); Persistent atrial fibrillation (H)      hypromellose (ARTIFICIAL TEARS) 0.5 % SOLN ophthalmic solution Place 1 drop into both eyes 4 times daily as needed for dry eyes.           Allergies   Allergies   Allergen Reactions    Seasonal Allergies       --  29   BUN 33.0* 36.9*  --  36.7*   CR 1.05 1.14  --  1.14   ANIONGAP 9 17*  --  11   CHERI 8.7* 9.2  --  9.5   * 168*  --  118*   ,   Results for orders placed or performed during the hospital encounter of 06/05/25   CT Abdomen Pelvis w Contrast    Narrative    EXAMINATION: CT ABDOMEN PELVIS W CONTRAST, 6/5/2025 2:37 PM    INDICATION: Left lower quadrant pain, generalized weakness, fall 4  days ago with bilateral hip pain, further evaluate    COMPARISON STUDY: 9/22/2024    TECHNIQUE: CT scan of the abdomen and pelvis was performed on  multidetector CT scanner using volumetric acquisition technique and  images were reconstructed in multiple planes with variable thickness  and reviewed on dedicated workstations.     CONTRAST: 135cc of isovue 370 injected IV without oral contrast    CT scan radiation dose is optimized to minimum requisite dose using  automated dose modulation techniques.    FINDINGS:    Lower thorax: Normal.    Liver: No mass. No intrahepatic biliary ductal dilation.    Biliary System: Cholelithiasis without cholecystitis.    Pancreas: No mass or pancreatic ductal dilation.    Adrenal glands: No mass or nodules    Spleen: Normal.    Kidneys: No suspicious mass, obstructing calculus or hydronephrosis.   Nonobstructing stone in the lower pole of the left kidney measuring 3  mm with second stone measuring 2 mm in the lower pole more  posteriorly. 2 mm right lower pole nonobstructing renal stone.    Gastrointestinal tract :Normal appendix. Normal caliber small bowel.    Mesentery/peritoneum/retroperitoneum: Small fat-containing umbilical  hernia. There No mass. No free fluid or air.    Lymph nodes: No significant lymphadenopathy.    Vasculature: Patent major abdominal vasculature.    Pelvis: Urinary bladder is normal.  1  Osseous structures: No aggressive or acute osseous lesion.      Soft tissues: Within normal limits.       Impression    IMPRESSION:   1. No acute abnormality within the abdomen or  pelvis.  2. Cholelithiasis without cholecystitis.  3. Nonobstructing lower pole renal stones measure up to 3 mm.    I have personally reviewed the examination and initial interpretation  and I agree with the findings.    AILYN RAMIREZ MD         SYSTEM ID:  [id number]   Chest XR,  PA & LAT    Narrative    EXAM: XR CHEST 2 VIEWS  6/5/2025 11:56 AM     HISTORY:  Generalized weakness       COMPARISON:  Chest radiograph 3/6/2019, CT chest 9/22/2024    FINDINGS:   Trachea is midline. Cardiac silhouette is within normal limits.  Calcifications of the aortic knob. No focal consolidative opacity. No  pleural effusion. No pneumothorax.    No acute osseous abnormality. Visualized soft tissues and upper  abdomen are unremarkable.         Impression    IMPRESSION:   No acute focal airspace disease.    I have personally reviewed the examination and initial interpretation  and I agree with the findings.    SOL CRAIG MD         SYSTEM ID:  [id number]   Head CT w/o contrast    Narrative    EXAM: CT HEAD W/O CONTRAST  6/5/2025 2:30 PM     HISTORY:  Headache, anticoagulated evaluate spontaneous bleed       COMPARISON:  CT 12/7/2024    TECHNIQUE: Using multidetector thin collimation helical acquisition  technique, axial, coronal and sagittal CT images from the skull base  to the vertex were obtained without intravenous contrast.   (topogram) image(s) also obtained and reviewed.    FINDINGS:  No acute intracranial hemorrhage, mass effect, or midline shift. No  acute loss of gray-white matter differentiation in the cerebral  hemispheres. 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 Ventricles are proportionate to the cerebral  sulci. Clear basal cisterns.    The bony calvaria and the bones of the skull base are normal. The  visualized portions of the paranasal sinuses and mastoid air cells are  clear. Grossly normal orbits.       Impression    IMPRESSION:   1. No acute intracranial  pathology.  2. Unchanged chronic infarcts within the bilateral occipital lobes,  left greater than right     I have personally reviewed the examination and initial interpretation  and I agree with the findings.    TARAN AMADO MD         SYSTEM ID:  [id number]   CT Lumbar Spine Reconstructed    Narrative    CT LUMBAR SPINE RECONSTRUCTED 6/5/2025 2:38 PM    History: Fall, pain, evaluate fracture.    Comparison: Lumbar spine CT 10/25/2024.    Technique: Reconstructed axial, coronal and sagittal CT images through  the lumbar spine were obtained from same day CT abdomen.     Findings:   Limited imaging. There are 5 lumbar type vertebrae. Regarding  alignment, mild levocurvature of the lumbar spine. There is moderate  to severe disc space narrowing at L4-5, otherwise preserved disc  spaces. Mild to moderate spondylosis of the lumbar spine with endplate  spurring, osteophytic bridging and facet arthropathy.     Please see same day CT abdomen for intra-abdominal and thoracic  findings.    Findings on a level by level basis are as follows:    L1-L2: No spinal canal or neuroforaminal stenosis.    L2-L3: No spinal canal or neuroforaminal stenosis.    L3-L4: Bilateral moderate neural foraminal stenosis. No spinal canal  stenosis.    L4-L5: Severe bilateral neural foraminal stenosis. No spinal canal  stenosis with    L5-S1: No spinal canal or neuroforaminal stenosis.      Impression    Impression:  1.  No acute fracture or traumatic dislocation of the lumbar spine.  2.  Multilevel lumbar degenerative changes, most pronounced at L3-4  and L4-5 with moderate and severe neural foraminal stenosis,  respectively. No high-grade spinal canal stenosis.     I have personally reviewed the examination and initial interpretation  and I agree with the findings.    TARAN AMADO MD         SYSTEM ID:  [id number]     *Note: Due to a large number of results and/or encounters for the requested time period, some results have not been displayed.  A complete set of results can be found in Results Review.       Discharge Medications   Current Discharge Medication List        START taking these medications    Details   diltiazem (CARDIZEM) 30 MG tablet Take 1 tablet (30 mg) by mouth 4 times daily.    Associated Diagnoses: Longstanding persistent atrial fibrillation (H)           CONTINUE these medications which have CHANGED    Details   potassium chloride chen ER (KLOR-CON M20) 20 MEQ CR tablet Take 2 tablets (40 mEq) by mouth daily.    Associated Diagnoses: Chronic heart failure with preserved ejection fraction (H)      torsemide (DEMADEX) 20 MG tablet Take 5 tablets (100 mg) by mouth daily.    Associated Diagnoses: Chronic heart failure with preserved ejection fraction (H); Hypertensive heart disease with heart failure (H)           CONTINUE these medications which have NOT CHANGED    Details   aspirin (ASA) 81 MG chewable tablet Take 1 tablet (81 mg) by mouth daily.  Qty: 200 tablet, Refills: 2    Associated Diagnoses: Essential hypertension      atorvastatin (LIPITOR) 40 MG tablet Take 1 tablet (40 mg) by mouth daily  Qty: 90 tablet, Refills: 3    Associated Diagnoses: Type 2 diabetes mellitus without complication, without long-term current use of insulin (H)      CERTAVITE/ANTIOXIDANTS tablet TAKE ONE TABLET BY MOUTH ONE TIME DAILY  Qty: 100 tablet, Refills: 0    Associated Diagnoses: Type 2 diabetes mellitus without complication, without long-term current use of insulin (H)      chlorthalidone (HYGROTON) 25 MG tablet Take 1 tablet (25 mg) by mouth daily.  Qty: 90 tablet, Refills: 11    Associated Diagnoses: Chronic heart failure with preserved ejection fraction (H); Hypertensive heart disease with heart failure (H)      FEROSUL 325 (65 Fe) MG tablet Take 1 tablet (325 mg) by mouth every other day. For iron deficiency anemia  Qty: 60 tablet, Refills: 0    Associated Diagnoses: Anemia, unspecified type      levothyroxine (SYNTHROID/LEVOTHROID) 175 MCG  tablet Take 1 tablet (175 mcg) by mouth every morning (before breakfast)  Qty: 90 tablet, Refills: 0    Associated Diagnoses: Hypothyroidism, unspecified type      magnesium oxide (MAG-OX) 400 MG tablet Take 1 tablet (400 mg) by mouth daily.  Qty: 90 tablet, Refills: 3    Associated Diagnoses: Nutritional deficiency      metFORMIN (GLUCOPHAGE) 500 MG tablet Take 1 tablet (500 mg) by mouth 2 times daily (with meals).  Qty: 180 tablet, Refills: 3    Associated Diagnoses: Type 2 diabetes mellitus with stage 3b chronic kidney disease, without long-term current use of insulin (H)      Omega-3 Fatty Acids (EQL FISH OIL) 1000 MG CAPS Take 1 capsule by mouth daily.  Qty: 90 capsule, Refills: 0    Associated Diagnoses: Hyperlipidemia LDL goal <100      senna-docusate (SENEXON-S) 8.6-50 MG tablet Take 1 to 2 tablets by mouth 2 times daily as needed for constipation  Qty: 180 tablet, Refills: 0    Associated Diagnoses: Constipation, unspecified constipation type      spironolactone (ALDACTONE) 50 MG tablet Take 1 tablet (50 mg) by mouth daily.    Associated Diagnoses: Chronic heart failure with preserved ejection fraction (H)      urea (CARMOL) 10 % external lotion Apply topically 2 times daily as needed for dry skin.    Associated Diagnoses: Adequate nutrition      vitamin C (ASCORBIC ACID) 1000 MG TABS Take 1,000 mg by mouth daily      Vitamin D, Cholecalciferol, 25 MCG (1000 UT) CAPS Take 1 capsule by mouth daily.      warfarin ANTICOAGULANT (COUMADIN) 3 MG tablet TAKE THREE TABLETS BY MOUTH AT BEDTIME  Qty: 90 tablet, Refills: 0    Associated Diagnoses: Chronic atrial fibrillation (H); Long term (current) use of anticoagulants; Paroxysmal atrial fibrillation (H); Persistent atrial fibrillation (H)      hypromellose (ARTIFICIAL TEARS) 0.5 % SOLN ophthalmic solution Place 1 drop into both eyes 4 times daily as needed for dry eyes.           Allergies   Allergies   Allergen Reactions    Seasonal Allergies

## 2025-06-10 LAB
ANION GAP SERPL CALCULATED.3IONS-SCNC: 13 MMOL/L (ref 7–15)
BUN SERPL-MCNC: 42.8 MG/DL (ref 8–23)
CALCIUM SERPL-MCNC: 9.3 MG/DL (ref 8.8–10.4)
CHLORIDE SERPL-SCNC: 89 MMOL/L (ref 98–107)
CREAT SERPL-MCNC: 1.17 MG/DL (ref 0.67–1.17)
EGFRCR SERPLBLD CKD-EPI 2021: 65 ML/MIN/1.73M2
ERYTHROCYTE [DISTWIDTH] IN BLOOD BY AUTOMATED COUNT: 13.6 % (ref 10–15)
GLUCOSE SERPL-MCNC: 157 MG/DL (ref 70–99)
HCO3 SERPL-SCNC: 31 MMOL/L (ref 22–29)
HCT VFR BLD AUTO: 44.2 % (ref 40–53)
HGB BLD-MCNC: 14.2 G/DL (ref 13.3–17.7)
INR PPP: 2.01 (ref 0.85–1.15)
MCH RBC QN AUTO: 26.8 PG (ref 26.5–33)
MCHC RBC AUTO-ENTMCNC: 32.1 G/DL (ref 31.5–36.5)
MCV RBC AUTO: 84 FL (ref 78–100)
PLATELET # BLD AUTO: 333 10E3/UL (ref 150–450)
POTASSIUM SERPL-SCNC: 4 MMOL/L (ref 3.4–5.3)
PROTHROMBIN TIME: 23.1 SECONDS (ref 11.8–14.8)
RBC # BLD AUTO: 5.29 10E6/UL (ref 4.4–5.9)
SODIUM SERPL-SCNC: 133 MMOL/L (ref 135–145)
WBC # BLD AUTO: 7.2 10E3/UL (ref 4–11)

## 2025-06-10 PROCEDURE — 80048 BASIC METABOLIC PNL TOTAL CA: CPT

## 2025-06-10 PROCEDURE — 250N000013 HC RX MED GY IP 250 OP 250 PS 637

## 2025-06-10 PROCEDURE — 36415 COLL VENOUS BLD VENIPUNCTURE: CPT

## 2025-06-10 PROCEDURE — 250N000013 HC RX MED GY IP 250 OP 250 PS 637: Performed by: INTERNAL MEDICINE

## 2025-06-10 PROCEDURE — 250N000013 HC RX MED GY IP 250 OP 250 PS 637: Performed by: FAMILY MEDICINE

## 2025-06-10 PROCEDURE — 85610 PROTHROMBIN TIME: CPT

## 2025-06-10 PROCEDURE — 99231 SBSQ HOSP IP/OBS SF/LOW 25: CPT | Mod: GC

## 2025-06-10 PROCEDURE — 120N000002 HC R&B MED SURG/OB UMMC

## 2025-06-10 PROCEDURE — 85014 HEMATOCRIT: CPT

## 2025-06-10 RX ORDER — WARFARIN SODIUM 5 MG/1
10 TABLET ORAL
Status: COMPLETED | OUTPATIENT
Start: 2025-06-10 | End: 2025-06-10

## 2025-06-10 RX ADMIN — SPIRONOLACTONE 50 MG: 50 TABLET, FILM COATED ORAL at 07:48

## 2025-06-10 RX ADMIN — POLYETHYLENE GLYCOL 3350 17 G: 17 POWDER, FOR SOLUTION ORAL at 07:47

## 2025-06-10 RX ADMIN — POTASSIUM CHLORIDE 40 MEQ: 1500 TABLET, EXTENDED RELEASE ORAL at 07:48

## 2025-06-10 RX ADMIN — Medication 25 MCG: at 07:47

## 2025-06-10 RX ADMIN — POLYETHYLENE GLYCOL 3350 17 G: 17 POWDER, FOR SOLUTION ORAL at 20:05

## 2025-06-10 RX ADMIN — ATORVASTATIN CALCIUM 40 MG: 40 TABLET, FILM COATED ORAL at 20:04

## 2025-06-10 RX ADMIN — MAGNESIUM OXIDE TAB 400 MG (241.3 MG ELEMENTAL MG) 400 MG: 400 (241.3 MG) TAB at 07:48

## 2025-06-10 RX ADMIN — SENNOSIDES AND DOCUSATE SODIUM 1 TABLET: 50; 8.6 TABLET ORAL at 20:04

## 2025-06-10 RX ADMIN — POTASSIUM CHLORIDE 40 MEQ: 1500 TABLET, EXTENDED RELEASE ORAL at 20:04

## 2025-06-10 RX ADMIN — METFORMIN HYDROCHLORIDE 500 MG: 500 TABLET, FILM COATED ORAL at 17:23

## 2025-06-10 RX ADMIN — ASPIRIN 81 MG CHEWABLE TABLET 81 MG: 81 TABLET CHEWABLE at 07:48

## 2025-06-10 RX ADMIN — WARFARIN SODIUM 10 MG: 5 TABLET ORAL at 17:23

## 2025-06-10 RX ADMIN — METFORMIN HYDROCHLORIDE 500 MG: 500 TABLET, FILM COATED ORAL at 07:48

## 2025-06-10 RX ADMIN — TORSEMIDE 100 MG: 100 TABLET ORAL at 12:20

## 2025-06-10 RX ADMIN — LEVOTHYROXINE SODIUM 175 MCG: 0.05 TABLET ORAL at 07:47

## 2025-06-10 ASSESSMENT — ACTIVITIES OF DAILY LIVING (ADL)
ADLS_ACUITY_SCORE: 62
ADLS_ACUITY_SCORE: 55
ADLS_ACUITY_SCORE: 57
ADLS_ACUITY_SCORE: 62
ADLS_ACUITY_SCORE: 55
ADLS_ACUITY_SCORE: 62
ADLS_ACUITY_SCORE: 55
ADLS_ACUITY_SCORE: 62
ADLS_ACUITY_SCORE: 55
ADLS_ACUITY_SCORE: 62
ADLS_ACUITY_SCORE: 55
ADLS_ACUITY_SCORE: 55
ADLS_ACUITY_SCORE: 62
ADLS_ACUITY_SCORE: 62
ADLS_ACUITY_SCORE: 55
ADLS_ACUITY_SCORE: 62
ADLS_ACUITY_SCORE: 62
ADLS_ACUITY_SCORE: 55
ADLS_ACUITY_SCORE: 62
ADLS_ACUITY_SCORE: 55
ADLS_ACUITY_SCORE: 62

## 2025-06-10 NOTE — PROGRESS NOTES
[facility]    Progress Note - [facility]       Date of Admission:  6/5/2025    Assessment & Plan   Clarke Moreira is a 75 year old male admitted on 6/5/2025. He has PMH of AF on warfarin, hypothyroidism, T2DM, MDD, HTN, HLD, HFpEF who is admitted for worsening weakness, recent GLF, MICHELLE, and hyponatremia.     Main Plans for Today  - Medically ready for discharge  - Continue working with PT/OT   - SW/CC working on TCU placement         #Generalized weakness, improving   #Recurrent Falls  #Peripheral Neuropathy  Has multiple previous admissions for similar situations ( 10/2024 and 12/2024). After those admissions, he went to TCU and with PT/OT, had improvement in strength. Unfortunately hasn't been able to have follow up with outpatient therapies since his home environment feels unsafe and makes it hard for him to get up or leave the house (as below). He reports over the last two weeks having increased weakness and episodes where he legs give out while he is standing. He had two falls on 5/31 and 6/1. Previously had been able to ambulate with a cane/walker but since these falls, he has hardly been out of bed due to feeling weak. Low PO intake during this time. Imaging on admit with no acute fracture seen on imaging. Suspect falls are likely from combination of deconditioning and muscle weakness, BLE edema (which is currently well managed), and diabetic neuropathy.   -- Continue working with PT/OT   -Discharge recommendation LTC, but TCU appropriate if pt can make gains within IP PT and demonstrate increase in activity tolerance and improve strength.   -- Discharge planning as below    #MICHELLE, prerenal, resolved   #Hyponatremia, resolved  #Hypokalemia, resolved   Cr on admit 1.42, baseline appears to be around 1.0. FeUrea indicative of prerenal cause of MICHELLE. Sodium 126 on admission with baseline ~135. Here, has fluctuated between hyponatremia and  hypernatremia throughout admission, which is consistent with his history.  Hypokalemia and hyponatremia likely 2/2 torsemide. Will continue to trend and adjust diuretics if hyponatremia becomes more severe.   -- Holding select PTA meds as below  -- Avoid nephrotoxic drugs as able  -- Daily BMP      #Moderate malnutrition in the context of acute illness   #Significant risk for refeeding syndrome  Reports only having two true meals over the last week prior to admission, otherwise only small amounts of PO intake. Lost 19% of body weight in the last month. Previously got meals with [employer] [place]. At high risk for refeeding syndrome due to extreme weight loss. Potassium critical value 2.4 6/7AM, on replacement protocol. Mg and phos wnl. Given Phos and Mg have been normal, suspicion for RF is low at this point.   -- CMP  -- Discontinue Mg and Phos daily      #Unsafe living environment  #Discharge planning  As above, home environment was not meeting nutritional nor PT/OT therapeutic needs after past TCU stay. Reports that his floor is covered in dirt and urine. Has had success with prior therapies at TCU but then returns home and is unable to maintain progress or his desired level of function. Anticipate will need to work closely and creatively with care coordination for finding a safe option for discharge.   -- SW to facilitate dispo planning  -Current plan is for patient to discharge to TCU once medically stable   -Referrals to  TCU and [facility], pending     #Abdominal pain, left lateral, resolved   #Constipation   Patient notes some left sided abdominal pain over the last 10 days PTA, feels like he is constipated. Notes worse constipation related to his limited mobility and being in bed more often. CT A/P on admit with cholelithiasis without cholecystitis No RUQ tenderness. CT with BL renal stones that are in the lower poles, nonobstructive.appears to also have moderate-large colonic stool burden without signs  "of obstruction. Since admission has had multiple bowel movements with improvement to his abdominal pain.     Bowel regimen:   -- Miralax  BID  -- Senakot BID  -- Monitor symptoms for improvement; if worsening or become more colicky in nature, may be related to known cholelithiasis though currently low suspicion     #HFpEF without current exacerbation  #Chronic AF on Warfarin  #HTN  #HLD  #Chronic BLE Edema, stable  Most recent Echo from 2024 with EF 55-60%. He was seen in in HFpEF clinic on 5/7/25 where edema and weakness was discussed. His BB was discontinued and he was started on Chlorthalidone. Semaglutide, SGLT2i, and Diltiazem were possible options going forward (patient previously declined starting SGLT2i in 3/2024 due to feeling well). On admit, troponin mildly elevated at 33 -> 32,  (improved from 1044 on 5/7/25). EKG with rate controlled AF. BLE edema currently better than it had been per patient, improved since cardiology adjusted medications. Reports some \"labored\" breathing more so when he lays flat, sounding consistent with orthopnea; he sleeps with HOB at 30 degrees at baseline. No current concern for exacerbation, overall dry on exam. No weight gain. His orthopnea and breathing are improved.   -- HOLD PTA Chlorthalidone 25mg daily in setting of acute MICHELLE  -- PTA Potassium 40meq BID   -- PTA Mg oxide 400mg daily  -- PTA Torsemide 100mg  -- PTA Spironolactone 50mg daily   -- PTA ASA 81 daily  -- PTA Atorvastatin 40mg daily  -- Pharmacy to dose warfarin  -- PT/OT  -- Compression stockings      #T2DM  #Peripheral Neuropathy  A1C on 3/26/25 6.2, stable compared to previous years trends. Home regimen includes Metformin 500mg BID. Was prescribed Ozempic but has yet to start it, wanting to wait until follow up appt with PCP in June. Patient has noted longstanding BLE neuropathy (numbness and pain to light touch) that he feels is getting worse and may be contributing to recent falls. He follows with " podiatry and was seen on 5/21/25 with no active ulcerations and sensations diminished bilaterally. Planning to see again in 12 weeks. Restarted metformin and stopped sliding scale insulin as he was not needing it.   -- PTA Metformin   -- Hypoglycemia protocol     #Hypothyroidism  TSH on admit 0.48. WNL in 10/2024. Has been on 175mcg Synthroid daily.   -- Continue PTA Synthroid 175mcg daily      #MDD  No chronic medications. Plans to follow up with Dr. Buenrostro outpatient. Next schedule appointment on 6/16/25.           Diet: Snacks/Supplements Adult: Ensure Max Protein (bariatric); With Meals  Combination Diet Regular Diet; Low Saturated Fat Na <2400mg Diet    DVT Prophylaxis: Warfarin  Bazzi Catheter: Not present  Fluids: PO  Lines: None     Cardiac Monitoring: None  Code Status: No CPR- Do NOT Intubate      Clinically Significant Risk Factors         # Hyponatremia: Lowest Na = 132 mmol/L in last 2 days, will monitor as appropriate  # Hypochloremia: Lowest Cl = 89 mmol/L in last 2 days, will monitor as appropriate          # Hypertension: Noted on problem list    # Chronic heart failure with preserved ejection fraction: heart failure noted on problem list and last echo with EF >50%           # Moderate Malnutrition: based on nutrition assessment and treatment provided per dietitian's recommendations.      # Financial/Environmental Concerns:           Social Drivers of Health   Housing Stability: Low Risk  (6/6/2025)    Housing Stability     Do you have housing? : Yes     Are you worried about losing your housing?: No   Recent Concern: Housing Stability - High Risk (3/26/2025)    Housing Stability     Do you have housing? : Yes     Are you worried about losing your housing?: Yes   Physical Activity: Inactive (3/26/2025)    Exercise Vital Sign     Days of Exercise per Week: 0 days     Minutes of Exercise per Session: 0 min   Social Connections: Unknown (3/26/2025)    Social Connection and Isolation Panel [NHANES]      Frequency of Social Gatherings with Friends and Family: Patient declined         Disposition Plan     Medically Ready for Discharge: Anticipated in 2-4 Days         The patient's care was discussed with the Attending Physician, Dr. George.   Shahab Kearns MD      [facility]  [facility]  Securely message with Image Searcher (more info)  Text page via [employer] Paging/Directory   See signed in provider for up to date coverage information  ______________________________________________________________________    Interval History   NAOE. 3 bowel movements overnight. Abdominal pain improving. He has been eating and tolerating meals. Continues to work with PT with improvement to his strength and mobility. Reports that the nursing staff has been great overnight.         Physical Exam   Vital Signs: Temp: 97.9  F (36.6  C) Temp src: Oral BP: 109/71 Pulse: 66   Resp: 17 SpO2: 98 % O2 Device: None (Room air)    Weight: 340 lbs 9.77 oz    Physical Exam  Vitals reviewed.   Constitutional:       General: He is not in acute distress.     Appearance: Normal appearance. He is not ill-appearing.   HENT:      Head: Normocephalic and atraumatic.   Eyes:      Conjunctiva/sclera: Conjunctivae normal.   Cardiovascular:      Rate and Rhythm: Normal rate.      Heart sounds: No murmur heard.  Pulmonary:      Effort: Pulmonary effort is normal. No respiratory distress.      Breath sounds: Normal breath sounds. No wheezing or rales.   Abdominal:      General: Bowel sounds are normal.      Tenderness: There is no abdominal tenderness. There is no guarding or rebound.   Musculoskeletal:      Right lower leg: Edema present.      Left lower leg: Edema present.      Comments: Trace pitting to mid-calf   Neurological:      General: No focal deficit present.      Mental Status: He is alert.   Psychiatric:         Behavior: Behavior normal.         Thought Content: Thought content  normal.           Medical Decision Making       Please see A&P for additional details of medical decision making.      Data   ------------------------- PAST 24 HR DATA REVIEWED -----------------------------------------------    I have personally reviewed the following data over the past 24 hrs:    7.2  \   14.2   / 333     133 (L) 89 (L) 42.8 (H) /  157 (H)   4.0 31 (H) 1.17 \     INR:  2.01 (H) PTT:  N/A   D-dimer:  N/A Fibrinogen:  N/A       Imaging results reviewed over the past 24 hrs:   No results found for this or any previous visit (from the past 24 hours).

## 2025-06-10 NOTE — PROGRESS NOTES
Care Management Follow Up    Length of Stay (days): 5    Expected Discharge Date: 06/10/2025     Concerns to be Addressed: Discharge Planning       Patient plan of care discussed at interdisciplinary rounds: Yes    Anticipated Discharge Disposition: Skilled Nursing Facility Transitional Care Unit (SNF TCU)     Anticipated Discharge Services: Post Acute Therapies, SNF TCU    Anticipated Discharge DME: None    Patient/family educated on Medicare website which has current facility and service quality ratings: Offered list, patient declined    Education Provided on the Discharge Plan: Yes    Patient/Family in Agreement with the Plan: Yes    Referrals Placed by CM/SW: Post Acute Facilities, SNF TCU    Private pay costs discussed: Not applicable    Discussed  Partnership in Safe Discharge Planning  document with patient/family: No     Handoff Completed: No, handoff not indicated or clinically appropriate    Additional Information:    Referrals sent to facilities that are 4 and 5 stars out of 5 within 10 miles.    Pending Referrals    [facility]  [address]  [facility] [address]  [phone]  6/8: Referral Sent  6/10: Message sent to admissions requesting an update on the status of referral.    [facility]  ([facility])  [phone]  6/10: Referral Sent    [facility]  ([facility])  [phone]  6/10: Referral Sent    [facility]  [address] [place], [place] [id number] [phone]  6/10: Referral Sent    [facility]  [address] [place], [place] [id number] [phone]  6/10: Referral Sent    [facility]  [address] [place] [address]  [phone]  6/10: Referral Sent    [facility]  [address] [place] [address]  [phone]  6/10: Referral Sent    [address] [place] [address]  [phone]  6/10: Referral Sent    [facility]  [address] [place] [address]  [phone]  6/10: Referral Sent    [facility]  [address] [place] [address]  [phone]  6/10: Referral Sent    [facility]  [address] [place], [place] [address] [phone]  6/10: Referral Sent    Declined Referrals    [facility]  ([facility], [facility], Pedro [facility])  [phone]  6/8: Declined due to acuity too high.    Next Steps:     Offer to schedule notary to notarize HCD  Follow up on TCU referrals.  If PT/OT no longer recommending TCU, revisit conversation about LTC.   Complete PAS.  Talk re: transport, possible out of pocket costs and schedule.  Administer IMM.  Send orders/scripts to facility.  Report number to bedside RN.    Suki Gomez, SIDSW, MSW  6th Floor Medical Surgical Unit  Phone [phone]      Message me securely in [employer]

## 2025-06-10 NOTE — PLAN OF CARE
"Goal Outcome Evaluation:      Plan of Care Reviewed With: patient    Overall Patient Progress: improvingOverall Patient Progress: improving         Shift: [id number]    VS: /72 (BP Location: Right arm)   Pulse 66   Temp 98.5  F (36.9  C) (Axillary)   Resp 17   Ht 1.803 m (5' 10.98\")   Wt (!) 154.5 kg (340 lb 9.8 oz)   SpO2 98%   BMI 47.53 kg/m      Pain:  denies  Neuro: A&Ox4 able to make needs known  Resp: stable on RA  Diet: low sat fat diet  Skin: scattered bruises and scabs  LDA: L PIV SL  Activity: assist of 2 w walker   Output: voids via urinal/bsc. LBM 06/10  Additional Info/shift updates: no updates   Call light within reach     Plan of care ongoing       "

## 2025-06-10 NOTE — PLAN OF CARE
"[id number]    Goal Outcome Evaluation:      Plan of Care Reviewed With: patient    Overall Patient Progress: improvingOverall Patient Progress: improving    Patient is A&O x4. Able to make needs known. Not OOB during the shift, frequent weight shifting and able to reposition in bed independently. Urinal at bedside. LBM: 06/09, bowel sounds active.     Room air, lung sounds clear. Low sat, fat Na <2400mg. L PIV, SL. Denies pain, SOB and n/v. Reports numbness and tingling to all extremities. Noted bilateral lower extremity edema, compression socks applied. Scattered scabs and bruises to extremities. Weak strength to extremities noted.     Continue with POC.     /73 (BP Location: Right arm, Patient Position: Semi-Wilkins's, Cuff Size: Adult Regular)   Pulse 60   Temp 98.1  F (36.7  C) (Oral)   Resp 19   Ht 1.803 m (5' 10.98\")   Wt (!) 154.5 kg (340 lb 9.8 oz)   SpO2 98%   BMI 47.53 kg/m      Frantz Recinos RN on 6/10/2025    "

## 2025-06-11 ENCOUNTER — APPOINTMENT (OUTPATIENT)
Dept: PHYSICAL THERAPY | Facility: CLINIC | Age: 75
End: 2025-06-11
Payer: COMMERCIAL

## 2025-06-11 LAB
ANION GAP SERPL CALCULATED.3IONS-SCNC: 13 MMOL/L (ref 7–15)
BUN SERPL-MCNC: 44.6 MG/DL (ref 8–23)
CALCIUM SERPL-MCNC: 9.2 MG/DL (ref 8.8–10.4)
CHLORIDE SERPL-SCNC: 90 MMOL/L (ref 98–107)
CREAT SERPL-MCNC: 1.16 MG/DL (ref 0.67–1.17)
EGFRCR SERPLBLD CKD-EPI 2021: 66 ML/MIN/1.73M2
ERYTHROCYTE [DISTWIDTH] IN BLOOD BY AUTOMATED COUNT: 13.5 % (ref 10–15)
GLUCOSE SERPL-MCNC: 155 MG/DL (ref 70–99)
HCO3 SERPL-SCNC: 27 MMOL/L (ref 22–29)
HCT VFR BLD AUTO: 42.6 % (ref 40–53)
HGB BLD-MCNC: 13.8 G/DL (ref 13.3–17.7)
INR PPP: 2.29 (ref 0.85–1.15)
MCH RBC QN AUTO: 27 PG (ref 26.5–33)
MCHC RBC AUTO-ENTMCNC: 32.4 G/DL (ref 31.5–36.5)
MCV RBC AUTO: 83 FL (ref 78–100)
PLATELET # BLD AUTO: 326 10E3/UL (ref 150–450)
POTASSIUM SERPL-SCNC: 3.7 MMOL/L (ref 3.4–5.3)
PROTHROMBIN TIME: 25.7 SECONDS (ref 11.8–14.8)
RBC # BLD AUTO: 5.12 10E6/UL (ref 4.4–5.9)
SODIUM SERPL-SCNC: 130 MMOL/L (ref 135–145)
WBC # BLD AUTO: 6.8 10E3/UL (ref 4–11)

## 2025-06-11 PROCEDURE — 120N000002 HC R&B MED SURG/OB UMMC

## 2025-06-11 PROCEDURE — 80048 BASIC METABOLIC PNL TOTAL CA: CPT

## 2025-06-11 PROCEDURE — 250N000013 HC RX MED GY IP 250 OP 250 PS 637

## 2025-06-11 PROCEDURE — 85018 HEMOGLOBIN: CPT

## 2025-06-11 PROCEDURE — 85610 PROTHROMBIN TIME: CPT

## 2025-06-11 PROCEDURE — 97530 THERAPEUTIC ACTIVITIES: CPT | Mod: GP

## 2025-06-11 PROCEDURE — 99231 SBSQ HOSP IP/OBS SF/LOW 25: CPT | Mod: GC

## 2025-06-11 PROCEDURE — 36415 COLL VENOUS BLD VENIPUNCTURE: CPT

## 2025-06-11 PROCEDURE — 250N000013 HC RX MED GY IP 250 OP 250 PS 637: Performed by: INTERNAL MEDICINE

## 2025-06-11 PROCEDURE — 250N000013 HC RX MED GY IP 250 OP 250 PS 637: Performed by: FAMILY MEDICINE

## 2025-06-11 PROCEDURE — 97116 GAIT TRAINING THERAPY: CPT | Mod: GP

## 2025-06-11 RX ORDER — WARFARIN SODIUM 5 MG/1
10 TABLET ORAL
Status: COMPLETED | OUTPATIENT
Start: 2025-06-11 | End: 2025-06-11

## 2025-06-11 RX ADMIN — DOCUSATE SODIUM 50 MG AND SENNOSIDES 8.6 MG 2 TABLET: 8.6; 5 TABLET, FILM COATED ORAL at 08:56

## 2025-06-11 RX ADMIN — WARFARIN SODIUM 10 MG: 5 TABLET ORAL at 17:48

## 2025-06-11 RX ADMIN — POTASSIUM CHLORIDE 40 MEQ: 1500 TABLET, EXTENDED RELEASE ORAL at 08:56

## 2025-06-11 RX ADMIN — SPIRONOLACTONE 50 MG: 50 TABLET, FILM COATED ORAL at 08:56

## 2025-06-11 RX ADMIN — METFORMIN HYDROCHLORIDE 500 MG: 500 TABLET, FILM COATED ORAL at 17:48

## 2025-06-11 RX ADMIN — ASPIRIN 81 MG CHEWABLE TABLET 81 MG: 81 TABLET CHEWABLE at 08:56

## 2025-06-11 RX ADMIN — MAGNESIUM OXIDE TAB 400 MG (241.3 MG ELEMENTAL MG) 400 MG: 400 (241.3 MG) TAB at 08:56

## 2025-06-11 RX ADMIN — ATORVASTATIN CALCIUM 40 MG: 40 TABLET, FILM COATED ORAL at 20:47

## 2025-06-11 RX ADMIN — POLYETHYLENE GLYCOL 3350 17 G: 17 POWDER, FOR SOLUTION ORAL at 08:56

## 2025-06-11 RX ADMIN — TORSEMIDE 100 MG: 100 TABLET ORAL at 11:46

## 2025-06-11 RX ADMIN — Medication 25 MCG: at 08:56

## 2025-06-11 RX ADMIN — LEVOTHYROXINE SODIUM 175 MCG: 0.05 TABLET ORAL at 08:56

## 2025-06-11 RX ADMIN — POTASSIUM CHLORIDE 40 MEQ: 1500 TABLET, EXTENDED RELEASE ORAL at 20:47

## 2025-06-11 RX ADMIN — METFORMIN HYDROCHLORIDE 500 MG: 500 TABLET, FILM COATED ORAL at 08:56

## 2025-06-11 ASSESSMENT — ACTIVITIES OF DAILY LIVING (ADL)
ADLS_ACUITY_SCORE: 58
ADLS_ACUITY_SCORE: 58
ADLS_ACUITY_SCORE: 56
ADLS_ACUITY_SCORE: 57
ADLS_ACUITY_SCORE: 56
ADLS_ACUITY_SCORE: 58
ADLS_ACUITY_SCORE: 57
ADLS_ACUITY_SCORE: 58
ADLS_ACUITY_SCORE: 56
ADLS_ACUITY_SCORE: 56
ADLS_ACUITY_SCORE: 57
ADLS_ACUITY_SCORE: 57
ADLS_ACUITY_SCORE: 58
ADLS_ACUITY_SCORE: 56
ADLS_ACUITY_SCORE: 58
ADLS_ACUITY_SCORE: 56
ADLS_ACUITY_SCORE: 56

## 2025-06-11 NOTE — PLAN OF CARE
"Goal Outcome Evaluation:      Plan of Care Reviewed With: grandparent(s)    Overall Patient Progress: improvingOverall Patient Progress: improving    BP 92/79 (BP Location: Right arm)   Pulse 55   Temp 98.7  F (37.1  C) (Oral)   Resp 18   Ht 1.803 m (5' 10.98\")   Wt (!) 144.5 kg (318 lb 9 oz)   SpO2 97%   BMI 44.45 kg/m         Pain:  denies  Neuro: A&Ox4 able to make needs known  Resp: stable on RA  Diet: low sat fat diet  Skin: scattered bruises and scabs    LDA: L PIV SL  Activity: assist of 2 w walker   Output: voids via urinal/bsc. LBM 06/11  Additional Info/shift updates: new media of L upper arm site that pt said is growing. Circled with skin marker. Worked with PT.   Call light within reach      Plan of care ongoing           "

## 2025-06-11 NOTE — PLAN OF CARE
Goal Outcome Evaluation:      Plan of Care Reviewed With: patient    Overall Patient Progress: no changeOverall Patient Progress: no change    Outcome Evaluation: pt is alert and oriented. denied pain. AO1 with walker and gait belt to BSC. using urinal - voiding well without difficulty. no complaints. able to make his needs known. will continue with plan of care

## 2025-06-11 NOTE — PROGRESS NOTES
[facility]    Progress Note - [facility] Service       Date of Admission:  6/5/2025    Assessment & Plan   Clarke Moreira is a 75 year old male admitted on 6/5/2025. He has PMH of AF on warfarin, hypothyroidism, T2DM, MDD, HTN, HLD, HFpEF who is admitted for worsening weakness, recent GLF, MICHELLE, and hyponatremia.     Main Plans for Today  - Medically ready for discharge  - Continue working with PT/OT   - SW/CC working on TCU placement, no updates today        #Generalized weakness, improving   #Recurrent Falls  #Peripheral Neuropathy  Has multiple previous admissions for similar situations ( 10/2024 and 12/2024). After those admissions, he went to TCU and with PT/OT, had improvement in strength. Unfortunately hasn't been able to have follow up with outpatient therapies since his home environment feels unsafe and makes it hard for him to get up or leave the house (as below). He reports over the last two weeks having increased weakness and episodes where he legs give out while he is standing. He had two falls on 5/31 and 6/1. Previously had been able to ambulate with a cane/walker but since these falls, he has hardly been out of bed due to feeling weak. Low PO intake during this time. Imaging on admit with no acute fracture seen on imaging. Suspect falls are likely from combination of deconditioning and muscle weakness, BLE edema (which is currently well managed), and diabetic neuropathy.   -- Continue working with PT/OT   -Discharge recommendation LTC, but TCU appropriate if pt can make gains within IP PT and demonstrate increase in activity tolerance and improve strength.   -- Discharge planning as below    #MICHELLE, prerenal, resolved   #Hyponatremia, resolved  #Hypokalemia, resolved   Cr on admit 1.42, baseline appears to be around 1.0. FeUrea indicative of prerenal cause of MICHELLE. Sodium 126 on admission with baseline ~135. Here, has fluctuated between  hyponatremia and hypernatremia throughout admission, which is consistent with his history.  Hypokalemia and hyponatremia likely 2/2 torsemide. Will continue to trend and adjust diuretics if hyponatremia becomes more severe.   -- Holding select PTA meds as below  -- Avoid nephrotoxic drugs as able  -- Daily BMP      #Moderate malnutrition in the context of acute illness   #Significant risk for refeeding syndrome  Reports only having two true meals over the last week prior to admission, otherwise only small amounts of PO intake. Lost 19% of body weight in the last month. Previously got meals with [employer] [place]. At high risk for refeeding syndrome due to extreme weight loss. Potassium critical value 2.4 6/7AM, on replacement protocol. Mg and phos wnl. Given Phos and Mg have been normal, suspicion for RF is low at this point.   -- CMP  -- Discontinue Mg and Phos daily      #Unsafe living environment  #Discharge planning  As above, home environment was not meeting nutritional nor PT/OT therapeutic needs after past TCU stay. Reports that his floor is covered in dirt and urine. Has had success with prior therapies at TCU but then returns home and is unable to maintain progress or his desired level of function. Anticipate will need to work closely and creatively with care coordination for finding a safe option for discharge.   -- SW to facilitate dispo planning  -Current plan is for patient to discharge to TCU once medically stable   -Referrals to  TCU and [facility], pending     #Abdominal pain, left lateral, resolved   #Constipation   Patient notes some left sided abdominal pain over the last 10 days PTA, feels like he is constipated. Notes worse constipation related to his limited mobility and being in bed more often. CT A/P on admit with cholelithiasis without cholecystitis No RUQ tenderness. CT with BL renal stones that are in the lower poles, nonobstructive.appears to also have moderate-large colonic stool  "burden without signs of obstruction. Since admission has had multiple bowel movements with improvement to his abdominal pain.     Bowel regimen:   -- Miralax  BID  -- Senakot BID  -- Monitor symptoms for improvement; if worsening or become more colicky in nature, may be related to known cholelithiasis though currently low suspicion     #HFpEF without current exacerbation  #Chronic AF on Warfarin  #HTN  #HLD  #Chronic BLE Edema, stable  Most recent Echo from 2024 with EF 55-60%. He was seen in in HFpEF clinic on 5/7/25 where edema and weakness was discussed. His BB was discontinued and he was started on Chlorthalidone. Semaglutide, SGLT2i, and Diltiazem were possible options going forward (patient previously declined starting SGLT2i in 3/2024 due to feeling well). On admit, troponin mildly elevated at 33 -> 32,  (improved from 1044 on 5/7/25). EKG with rate controlled AF. BLE edema currently better than it had been per patient, improved since cardiology adjusted medications. Reports some \"labored\" breathing more so when he lays flat, sounding consistent with orthopnea; he sleeps with HOB at 30 degrees at baseline. No current concern for exacerbation, overall dry on exam. No weight gain. His orthopnea and breathing are improved.   -- HOLD PTA Chlorthalidone 25mg daily in setting of acute MICHELLE  -- PTA Potassium 40meq BID   -- PTA Mg oxide 400mg daily  -- PTA Torsemide 100mg  -- PTA Spironolactone 50mg daily   -- PTA ASA 81 daily  -- PTA Atorvastatin 40mg daily  -- Pharmacy to dose warfarin  -- PT/OT  -- Compression stockings      #T2DM  #Peripheral Neuropathy  A1C on 3/26/25 6.2, stable compared to previous years trends. Home regimen includes Metformin 500mg BID. Was prescribed Ozempic but has yet to start it, wanting to wait until follow up appt with PCP in June. Patient has noted longstanding BLE neuropathy (numbness and pain to light touch) that he feels is getting worse and may be contributing to recent " "falls. He follows with podiatry and was seen on 5/21/25 with no active ulcerations and sensations diminished bilaterally. Planning to see again in 12 weeks. Restarted metformin and stopped sliding scale insulin as he was not needing it.   -- PTA Metformin   -- Hypoglycemia protocol     #Hypothyroidism  TSH on admit 0.48. WNL in 10/2024. Has been on 175mcg Synthroid daily.   -- Continue PTA Synthroid 175mcg daily      #MDD  No chronic medications. Plans to follow up with Dr. Buenrostro outpatient. Next schedule appointment on 6/16/25.            Diet: Snacks/Supplements Adult: Ensure Max Protein (bariatric); With Meals  Combination Diet Regular Diet; Low Saturated Fat Na <2400mg Diet    DVT Prophylaxis: Warfarin  Bazzi Catheter: Not present  Fluids: PO   Lines: None     Cardiac Monitoring: None  Code Status: No CPR- Do NOT Intubate      Clinically Significant Risk Factors         # Hyponatremia: Lowest Na = 130 mmol/L in last 2 days, will monitor as appropriate  # Hypochloremia: Lowest Cl = 89 mmol/L in last 2 days, will monitor as appropriate          # Hypertension: Noted on problem list  # Chronic heart failure with preserved ejection fraction: heart failure noted on problem list and last echo with EF >50%           # Morbid Obesity: Estimated body mass index is 44.45 kg/m  as calculated from the following:    Height as of this encounter: 1.803 m (5' 10.98\").    Weight as of this encounter: 144.5 kg (318 lb 9 oz).   # Moderate Malnutrition: based on nutrition assessment and treatment provided per dietitian's recommendations.    # Financial/Environmental Concerns:           Social Drivers of Health   Housing Stability: Low Risk  (6/6/2025)    Housing Stability     Do you have housing? : Yes     Are you worried about losing your housing?: No   Recent Concern: Housing Stability - High Risk (3/26/2025)    Housing Stability     Do you have housing? : Yes     Are you worried about losing your housing?: Yes   Physical " Activity: Inactive (3/26/2025)    Exercise Vital Sign     Days of Exercise per Week: 0 days     Minutes of Exercise per Session: 0 min   Social Connections: Unknown (3/26/2025)    Social Connection and Isolation Panel [NHANES]     Frequency of Social Gatherings with Friends and Family: Patient declined         Disposition Plan     Medically Ready for Discharge: Ready Now, pending placement          The patient's care was discussed with the Attending Physician, Dr. George.    [facility]  [facility]  Securely message with [employer] (more info)  Text page via [employer] Paging/Directory   See signed in provider for up to date coverage information  ______________________________________________________________________    Interval History   - no acute events overnight   - feels overall improvement but is stressed about his mobility and bilateral leg pain     Physical Exam   Vital Signs: Temp: 98.1  F (36.7  C) Temp src: Oral BP: 116/80 Pulse: 117   Resp: 16 SpO2: 100 % O2 Device: None (Room air)    Weight: 318 lbs 9.04 oz    General Appearance: No acute distress     Respiratory: CTAB    Cardiovascular: RRR, no murmurs   GI: soft, flat, NTND   Extr: trace edema and tenderness to pressure palpation in bilateral lower extremities      Medical Decision Making       Please see A&P for additional details of medical decision making.      Data     I have personally reviewed the following data over the past 24 hrs:    6.8  \   13.8   / 326     130 (L) 90 (L) 44.6 (H) /  155 (H)   3.7 27 1.16 \     INR:  2.29 (H) PTT:  N/A   D-dimer:  N/A Fibrinogen:  N/A

## 2025-06-11 NOTE — PROGRESS NOTES
Care Management                [facility]  [address]  [facility] [address]  [phone]  6/8: Referral Sent  6/10: Message sent to admissions requesting an update on the status of referral.  6/11:CHW spoke with Admissions. It is felt that the patient needs community TCU with the capability of LTC.    [facility] Referral  ([facility])  [phone]  6/10: Referral Sent  6/11:CHW spoke with Admissions. [facility] have no beds. [facility] has a bed. This writer requested the referral be sent to [facility].    [facility] Referral  ([facility])  [phone]  6/10: Referral Sent  6/11:CHW spoke with Kayla. No beds are available at the [facility]. Patient needs to be at an assist of 2 versus an assist of 3.    [facility]  [address] [place] [address]  [phone]  6/10: Referral Sent  6/11:CHW LVM with Ross,Admissions requesting a return call.      DECLINED:  [facility]  [address] [place] [address]  [phone]  6/10: Referral Sent     [facility]  [address] [place] [address]  [phone]  6/10: Referral Sent     [facility]  [address] [place], [place] [address] [phone]  6/10: Referral Sent     [facility]  [address] [place] [address]  [phone]  6/10: Referral Sent     [facility]  [address] [place], [place] [address] [phone]  6/10: Referral Sent     [facility]  [address] [place], [place] [address] [phone]  6/10: Referral Sent     [facility]  [address] [place], [place] [address] [phone]  6/10: Referral Sent     Declined Referrals     [facility] Referral  ([facility]  Ricardo Gu [facility], Pedro Benedict)  [phone]  6/8: Declined due to acuity too high.    Miguel Alvarez  In-Patient W  5&6 Med-[facility]  [phone]

## 2025-06-12 ENCOUNTER — APPOINTMENT (OUTPATIENT)
Dept: PHYSICAL THERAPY | Facility: CLINIC | Age: 75
End: 2025-06-12
Payer: COMMERCIAL

## 2025-06-12 VITALS
SYSTOLIC BLOOD PRESSURE: 121 MMHG | BODY MASS INDEX: 44.1 KG/M2 | TEMPERATURE: 97.7 F | OXYGEN SATURATION: 100 % | DIASTOLIC BLOOD PRESSURE: 69 MMHG | HEIGHT: 71 IN | WEIGHT: 315 LBS | HEART RATE: 82 BPM | RESPIRATION RATE: 18 BRPM

## 2025-06-12 LAB
ANION GAP SERPL CALCULATED.3IONS-SCNC: 10 MMOL/L (ref 7–15)
BUN SERPL-MCNC: 39.9 MG/DL (ref 8–23)
CALCIUM SERPL-MCNC: 9 MG/DL (ref 8.8–10.4)
CHLORIDE SERPL-SCNC: 93 MMOL/L (ref 98–107)
CREAT SERPL-MCNC: 1.05 MG/DL (ref 0.67–1.17)
EGFRCR SERPLBLD CKD-EPI 2021: 74 ML/MIN/1.73M2
ERYTHROCYTE [DISTWIDTH] IN BLOOD BY AUTOMATED COUNT: 13.7 % (ref 10–15)
GLUCOSE SERPL-MCNC: 128 MG/DL (ref 70–99)
HCO3 SERPL-SCNC: 28 MMOL/L (ref 22–29)
HCT VFR BLD AUTO: 40.1 % (ref 40–53)
HGB BLD-MCNC: 12.7 G/DL (ref 13.3–17.7)
INR PPP: 2.22 (ref 0.85–1.15)
MCH RBC QN AUTO: 26.2 PG (ref 26.5–33)
MCHC RBC AUTO-ENTMCNC: 31.7 G/DL (ref 31.5–36.5)
MCV RBC AUTO: 83 FL (ref 78–100)
PLATELET # BLD AUTO: 321 10E3/UL (ref 150–450)
POTASSIUM SERPL-SCNC: 3.8 MMOL/L (ref 3.4–5.3)
PROTHROMBIN TIME: 24.9 SECONDS (ref 11.8–14.8)
RBC # BLD AUTO: 4.85 10E6/UL (ref 4.4–5.9)
SODIUM SERPL-SCNC: 131 MMOL/L (ref 135–145)
WBC # BLD AUTO: 6.2 10E3/UL (ref 4–11)

## 2025-06-12 PROCEDURE — 99231 SBSQ HOSP IP/OBS SF/LOW 25: CPT | Mod: GC

## 2025-06-12 PROCEDURE — 250N000013 HC RX MED GY IP 250 OP 250 PS 637

## 2025-06-12 PROCEDURE — 36415 COLL VENOUS BLD VENIPUNCTURE: CPT

## 2025-06-12 PROCEDURE — 85610 PROTHROMBIN TIME: CPT

## 2025-06-12 PROCEDURE — 82310 ASSAY OF CALCIUM: CPT

## 2025-06-12 PROCEDURE — 85027 COMPLETE CBC AUTOMATED: CPT

## 2025-06-12 PROCEDURE — 250N000013 HC RX MED GY IP 250 OP 250 PS 637: Performed by: FAMILY MEDICINE

## 2025-06-12 PROCEDURE — 97116 GAIT TRAINING THERAPY: CPT | Mod: GP

## 2025-06-12 PROCEDURE — 120N000002 HC R&B MED SURG/OB UMMC

## 2025-06-12 PROCEDURE — 97530 THERAPEUTIC ACTIVITIES: CPT | Mod: GP

## 2025-06-12 RX ORDER — WARFARIN SODIUM 5 MG/1
10 TABLET ORAL
Status: COMPLETED | OUTPATIENT
Start: 2025-06-12 | End: 2025-06-12

## 2025-06-12 RX ORDER — AMOXICILLIN 250 MG
2 CAPSULE ORAL 2 TIMES DAILY PRN
Status: DISCONTINUED | OUTPATIENT
Start: 2025-06-12 | End: 2025-06-15 | Stop reason: HOSPADM

## 2025-06-12 RX ORDER — TORSEMIDE 20 MG/1
100 TABLET ORAL DAILY PRN
DISCHARGE
Start: 2025-06-12 | End: 2025-06-15

## 2025-06-12 RX ORDER — AMOXICILLIN 250 MG
1 CAPSULE ORAL 2 TIMES DAILY PRN
Status: DISCONTINUED | OUTPATIENT
Start: 2025-06-12 | End: 2025-06-15 | Stop reason: HOSPADM

## 2025-06-12 RX ORDER — POLYETHYLENE GLYCOL 3350 17 G/17G
17 POWDER, FOR SOLUTION ORAL 2 TIMES DAILY PRN
Status: DISCONTINUED | OUTPATIENT
Start: 2025-06-12 | End: 2025-06-15 | Stop reason: HOSPADM

## 2025-06-12 RX ADMIN — Medication 25 MCG: at 09:47

## 2025-06-12 RX ADMIN — ASPIRIN 81 MG CHEWABLE TABLET 81 MG: 81 TABLET CHEWABLE at 09:47

## 2025-06-12 RX ADMIN — WARFARIN SODIUM 10 MG: 5 TABLET ORAL at 18:07

## 2025-06-12 RX ADMIN — MAGNESIUM OXIDE TAB 400 MG (241.3 MG ELEMENTAL MG) 400 MG: 400 (241.3 MG) TAB at 09:47

## 2025-06-12 RX ADMIN — POTASSIUM CHLORIDE 40 MEQ: 1500 TABLET, EXTENDED RELEASE ORAL at 09:47

## 2025-06-12 RX ADMIN — METFORMIN HYDROCHLORIDE 500 MG: 500 TABLET, FILM COATED ORAL at 09:47

## 2025-06-12 RX ADMIN — TORSEMIDE 100 MG: 100 TABLET ORAL at 12:30

## 2025-06-12 RX ADMIN — METFORMIN HYDROCHLORIDE 500 MG: 500 TABLET, FILM COATED ORAL at 18:07

## 2025-06-12 RX ADMIN — SPIRONOLACTONE 50 MG: 50 TABLET, FILM COATED ORAL at 10:50

## 2025-06-12 RX ADMIN — ATORVASTATIN CALCIUM 40 MG: 40 TABLET, FILM COATED ORAL at 19:30

## 2025-06-12 RX ADMIN — POLYETHYLENE GLYCOL 3350 17 G: 17 POWDER, FOR SOLUTION ORAL at 19:35

## 2025-06-12 RX ADMIN — LEVOTHYROXINE SODIUM 175 MCG: 0.05 TABLET ORAL at 06:32

## 2025-06-12 RX ADMIN — POTASSIUM CHLORIDE 40 MEQ: 1500 TABLET, EXTENDED RELEASE ORAL at 19:31

## 2025-06-12 RX ADMIN — DOCUSATE SODIUM 50 MG AND SENNOSIDES 8.6 MG 1 TABLET: 8.6; 5 TABLET, FILM COATED ORAL at 19:31

## 2025-06-12 ASSESSMENT — ACTIVITIES OF DAILY LIVING (ADL)
ADLS_ACUITY_SCORE: 57
ADLS_ACUITY_SCORE: 56
ADLS_ACUITY_SCORE: 57
ADLS_ACUITY_SCORE: 56
ADLS_ACUITY_SCORE: 57
ADLS_ACUITY_SCORE: 57
ADLS_ACUITY_SCORE: 56
ADLS_ACUITY_SCORE: 57
ADLS_ACUITY_SCORE: 57
ADLS_ACUITY_SCORE: 56
ADLS_ACUITY_SCORE: 57
ADLS_ACUITY_SCORE: 56
ADLS_ACUITY_SCORE: 57
ADLS_ACUITY_SCORE: 56

## 2025-06-12 NOTE — PLAN OF CARE
Goal Outcome Evaluation: 1900-2330    Plan of Care Reviewed With: patient  Overall Patient Progress: improving    A/Ox4, VS WNL. Heavy A1 in transfers with GB/Walker. Denied pain,SOB, dizziness, light-headedness. Breathing comfortably. Declined CPAP. Baseline numbness/tingling in BUE and BLE.. Encouraged to keep BLE elevated on pillows for edema. Awaiting TCU placement.

## 2025-06-12 NOTE — PROGRESS NOTES
Care Management Discharge Note    Discharge Date: 06/14/25     Discharge Disposition: Skilled Nursing Facility Transitional Care Unit (SNF TCU)    [facility]    Discharge Services: Post Acute Therapies, Transportation Services    Discharge DME: None    Discharge Transportation: [facility] [employer] wheelchair transportation between 10:36 am and 11:21 am [phone] Writer discussed with patient that insurance may not cover the cost of transport.     Private pay costs discussed: transportation costs    Does the patient's insurance plan have a 3 day qualifying hospital stay waiver?  Yes     Which insurance plan 3 day waiver is available? Alternative insurance waiver    Will the waiver be used for post-acute placement? No    PAS Confirmation Code: [id number]     Patient/family educated on Medicare website which has current facility and service quality ratings: Offered list, patient declined.    Education Provided on the Discharge Plan: Yes    Persons Notified of Discharge Plans: Charge Nurse, Bedside Nurse, MD, Patient    Patient/Family in Agreement with the Plan: Yes    Handoff Referral Completed: No, handoff not indicated or clinically appropriate    Additional Information:    Writer met with patient to discuss his accepting facilities. He declined [facility]. The other facility that accepted was in [place], which is away from where he wished to be, but the only other accepting facility.     Writer confirmed with facility that they have a phone and TV in room, that they have on site laundry, that they provide therapy 5 days/week an that the staff is onsite every day. Writer scheduled transportation for tomorrow between 10:36 am and 11:20 am.     Patient asked writer to have [facility] re-review. They did and are able to accept on Saturday, June 14 after 11. Writer rescheduled ride for between 10:36 am and 11:20 am on June 14. IMM completed, faxed to HIM, documented in Epic and  original in chart. Copy provided to patient. PAS completed. Discharge orders will be faxed to facility on day of discharge to [phone]. A nurse report is requested by calling [phone].     To be completed on day of discharge:    Give Report # [phone] to bedside RN  Fax discharge orders to [phone]  Call SLL to update discharge location    JANE Hayward, AARON  6th Floor Medical Surgical Unit  Phone [phone]      Message me securely in [employer]

## 2025-06-12 NOTE — PROGRESS NOTES
Care Management Follow Up    Length of Stay (days): 7    Expected Discharge Date: 06/12/2025     Concerns to be Addressed: Discharge Planning       Patient plan of care discussed at interdisciplinary rounds: Yes    Anticipated Discharge Disposition: Transitional Care Unit (TCU)     Anticipated Discharge Services: Post Acute Therapies    Anticipated Discharge DME: None    Patient/family educated on Medicare website which has current facility and service quality ratings: Offered list, patient declined    Education Provided on the Discharge Plan: Yes    Patient/Family in Agreement with the Plan: Yes    Referrals Placed by CM/SW: Post Acute Facilities    Private pay costs discussed: Not applicable    Discussed  Partnership in Safe Discharge Planning  document with patient/family: No     Handoff Completed: No, handoff not indicated or clinically appropriate    Additional Information:    Pending Referrals    [facility] [address] [place], [place] [id number] [phone]  6/12: Referral Sent    [facility]  [address] [place] [address]  [phone]  6/12: Referral Sent    [facility]  [address] [place] [id number] [phone]  6/12: Referral Sent    [facility]  [address] [place], [place] [id number] [phone]  6/12: Referral Sent    [facility] Referral  ([facility])  [phone]  6/10: Referral Sent  6/11:CHW spoke with Admissions. [facility] have no beds. [facility] bed. This writer requested the referral be sent to [facility].  6/12: [facility] is reviewing. Writer spoke with Karen in admissions who will call back with decision.    [facility] Referral  ([facility])  [phone]  6/10: Referral Sent  6/11:CHW spoke with Kayla. No beds are available at the [facility]. Patient needs to be at an assist of 2 versus an  assist of 3.  6/12: Writer left message with Kayla in admissions that patient is not assist of 1-2, not 3. Asked for call back regarding referral.    Declined Referrals    [facility]  [address] [place] [address]  [phone]  6/12: Declined due to bed not available.    [facility]  [address] [place] [address]  [phone]  6/12: Declined due to bariatric needs.    [facility]  [address] [place] [address]  [phone]  6/12: Declined due to bariatric needs.    [facility] [place] [address]  [facility] [address]  [phone]  6/11: Declined due to needs community TCU with the capability of LTC.    [facility]  [address] [place] [address]  [phone]  6/11: Declined due to weight.    [facility]  [address] [place] [address]  [phone]  6/10: Declined  due to acuity too high, complex medical needs, no plan.     [facility]  [address] [place] [address]  [phone]  6/10: Declined due to cannot meet patient's needs.      [facility]  [address] [place] [address]  [phone]  6/10: Declined due to cannot meet patient's psychosocial needs.     [facility]  [address] [place] [address]  [phone]  6/10: Referral Sent     [facility]  [address] [place] [address]  [phone]  6/10: Declined due to bed not available.     [facility]  [address] [place] [address]  [phone]  6/10: Declined due to fall risk.    [facility] Referral  (Kenna Myles, Ricardo Guerrero [facility], Jones Harrison, Pedro Benedict)  [phone]  6/8: Declined due to acuity too  high.    Next Steps:     Offer to schedule notary to notarize HCD  Follow up on TCU referrals.  If PT/OT no longer recommending TCU, revisit conversation about LTC.   Complete PAS.  Talk re: transport, possible out of pocket costs and schedule.  Administer IMM.  Send orders/scripts to facility.  Report number to bedside RN.    JANE Hayward, MSW  6th Floor Medical Surgical Unit  Phone [phone]      Message me securely in [employer]

## 2025-06-12 NOTE — PROGRESS NOTES
[facility]    Progress Note - [facility] Medicine Service       Date of Admission:  6/5/2025    Assessment & Plan   Clarke Moreira is a 75 year old male admitted on 6/5/2025. He has PMH of AF on warfarin, hypothyroidism, T2DM, MDD, HTN, HLD, HFpEF who was admitted for worsening weakness, MICHELLE, and hyponatremia. Currently, MICHELLE has resolved and patient has been working with PT/OT pending TCU placement.    Main Plans for Today  - Medically ready for discharge  - Continue working with PT/OT   - Anticipate discharge to TCU in next 1-2 days  - Space labs to q48h  - Transition to PRN bowel regimen given resolution of constipation    #Generalized weakness, improving   #Recurrent Falls  #Peripheral Neuropathy  Has multiple previous admissions for similar situations in late 2024. Presented with two weeks of increased weakness and two recent falls, causing him to be essentially bed-bound. No acute fractures found on admission. Suspect falls are likely from combination of deconditioning and muscle weakness, BLE edema (currently well managed), and diabetic neuropathy.   -- Continue working with PT/OT   - Discharge to TCU appropriate as patient has demonstrated improvement with PT/OT    #Unsafe living environment  #Discharge planning  As above, home environment was not meeting nutritional nor PT/OT therapeutic needs after past TCU stay. Reports that his floor is covered in dirt and urine. Has had success with prior therapies at TCU but then returns home and is unable to maintain progress or his desired level of function.    #MICHELLE, prerenal, resolved   #Hyponatremia, resolved  #Hypokalemia, resolved   Cr on admit 1.42, baseline appears to be around 1.0. FeUrea indicative of prerenal cause of MICHELLE. Sodium 126 on admission with baseline ~135. Here, has fluctuated between hyponatremia and hypernatremia throughout admission, which is consistent with his history.  Hypokalemia and  hyponatremia likely 2/2 torsemide.   -- Holding select PTA meds as below  -- Avoid nephrotoxic drugs as able  -- BMP q48h    #Moderate malnutrition in the context of acute illness   Reports only having two true meals in the week prior to admission. Lost 19% of body weight in the last month. Previously got meals with [employer] [facility]. Initially had concern for refeeding with some electrolyte abnormalities while restarting diet, but has been stable for many days.    #Abdominal pain, left lateral, resolved   #Constipation   Presented with 1.5 weeks abdominal pain, which patient suspected was 2/2 constipation. CT A/P on admit showed cholelithiasis without cholecystitis, non-obstructive nephrolithiasis and moderate-large colonic stool burden without signs of obstruction. Since admission has had multiple bowel movements with improvement to his abdominal pain.   -- Miralax BID PRN  -- Senna BID PRN     #HFpEF without current exacerbation  #Chronic AF on Warfarin  #HTN  #HLD  #Chronic BLE Edema, stable  Most recent Echo from 2024 with EF 55-60%. He was seen in HFpEF clinic on 5/7/25 where edema and weakness was discussed. His BB was discontinued and he was started on Chlorthalidone. Semaglutide, SGLT2i, and Diltiazem were possible options going forward (patient previously declined starting SGLT2i in 3/2024). On admit, troponin mildly elevated at 33 -> 32,  (improved from 1044 on 5/7/25). EKG with rate controlled AF. BLE edema currently better than it had been per patient, improved since cardiology adjusted medications. No current concern for exacerbation, overall dry on exam.  -- HOLD PTA Chlorthalidone 25mg daily (pressures stable without, edema well controlled)  -- PTA Potassium 40meq BID   -- PTA Mg oxide 400mg daily  -- PTA Torsemide 100mg  -- PTA Spironolactone 50mg daily   -- PTA ASA 81 daily  -- PTA Atorvastatin 40mg daily  -- Pharmacy to dose warfarin  -- PT/OT  -- Compression stockings      #T2DM  #Peripheral  "Neuropathy  A1C on 3/26/25 6.2, stable. Home regimen is Metformin 500mg BID. Was prescribed Ozempic but has yet to start it, wanting to wait until follow up appt with PCP in June. He follows with podiatry and was seen on 5/21/25 with no active ulcerations and sensations diminished bilaterally. Planning to see again in 12 weeks.  -- PTA Metformin 500 mg BID  -- BMP q48h to monitor glucose, no need for more frequent checks  -- Hypoglycemia protocol     #Hypothyroidism  TSH on admit 0.48. WNL in 10/2024.  -- PTA Synthroid 175mcg daily      #MDD  No chronic medications. Plans to follow up with Dr. Buenrostro outpatient. Next schedule appointment on 6/16/25.            Diet: Snacks/Supplements Adult: Ensure Max Protein (bariatric); With Meals  Combination Diet Regular Diet; Low Saturated Fat Na <2400mg Diet    DVT Prophylaxis: Warfarin  Bazzi Catheter: Not present  Fluids: PO   Lines: None     Cardiac Monitoring: None  Code Status: No CPR- Do NOT Intubate      Clinically Significant Risk Factors         # Hyponatremia: Lowest Na = 130 mmol/L in last 2 days, will monitor as appropriate  # Hypochloremia: Lowest Cl = 90 mmol/L in last 2 days, will monitor as appropriate          # Hypertension: Noted on problem list    # Chronic heart failure with preserved ejection fraction: heart failure noted on problem list and last echo with EF >50%           # Morbid Obesity: Estimated body mass index is 45 kg/m  as calculated from the following:    Height as of this encounter: 1.803 m (5' 10.98\").    Weight as of this encounter: 146.3 kg (322 lb 8.5 oz).   # Moderate Malnutrition: based on nutrition assessment and treatment provided per dietitian's recommendations.      # Financial/Environmental Concerns:           Social Drivers of Health   Housing Stability: Low Risk  (6/6/2025)    Housing Stability     Do you have housing? : Yes     Are you worried about losing your housing?: No   Recent Concern: Housing Stability - High Risk " (3/26/2025)    Housing Stability     Do you have housing? : Yes     Are you worried about losing your housing?: Yes   Physical Activity: Inactive (3/26/2025)    Exercise Vital Sign     Days of Exercise per Week: 0 days     Minutes of Exercise per Session: 0 min   Social Connections: Unknown (3/26/2025)    Social Connection and Isolation Panel [NHANES]     Frequency of Social Gatherings with Friends and Family: Patient declined         Disposition Plan     Medically Ready for Discharge: Ready Now, pending placement          The patient's care was discussed with the Attending Physician, Dr. George.    Hari Dickson MD  [facility]  [facility]  Securely message with [employer] (more info)  Text page via [employer] Paging/Directory   See signed in provider for up to date coverage information  ______________________________________________________________________    Interval History   No acute events overnight. This morning, he reports he is very happy with the care he has received here, and he has been noticing improvement in his strength working with PT. His goal is to be able to ambulate to the bathroom over the next couple of days and stop using the bedside commode.    Physical Exam   Vital Signs: Temp: 98.5  F (36.9  C) Temp src: Oral BP: 100/53 Pulse: 91   Resp: 16 SpO2: 97 % O2 Device: None (Room air)    Weight: 322 lbs 8.53 oz    General Appearance: No acute distress     Respiratory: CTAB    Cardiovascular: RRR, no murmurs   GI: soft, flat, NTND   Extr: trace edema in bilateral lower extremities      Medical Decision Making   Please see A&P for additional details of medical decision making.      Data

## 2025-06-12 NOTE — PROGRESS NOTES
CLINICAL NUTRITION SERVICES - REASSESSMENT NOTE     RECOMMENDATIONS FOR MDs/PROVIDERS TO ORDER:  ***    Registered Dietitian Interventions:  ***    Future/Additional Recommendations:  ***     INFORMATION OBTAINED  {RDNSubjectiveinformation:681713}    CURRENT NUTRITION ORDERS  Diet: Low Saturated Fat/2400 mg Sodium  Snacks/Supplements: Ensure Max BID      CURRENT INTAKE/TOLERANCE  Pt consuming 100% of meals and ordering 3 meals a day.     NEW FINDINGS  GI symptoms: Reviewed    Skin/wounds: Reviewed    Nutrition-relevant labs: Reviewed  Nutrition-relevant medications: mag-ox 400 mg, metformin BID, potassium chloride 40 mEq BID, spironolactone, torsemide, vitamin D3 25 mcg, warfarin  Weight: Last weight (6/12) 146.3 kg. Wt fluctuating during admission, relatively stable compared to admit wt. 18.5% wt loss in 1 month (down from 179.6 kg on 5/8). Cannot rule out fluid shifts as contributor to wt loss.    MALNUTRITION  % Intake: {RDNMalnutrition%intake:522198}  % Weight Loss: {RDNMalnutrition%weightloss:244380}  Subcutaneous Fat Loss: {RDNMalnutritionsubcutaneousfatloss:381758}  Muscle Loss: {RDNMalnutritionmuscleloss:166420}  Fluid Accumulation/Edema: {RDNMalnutritionfluidaccumulationedema:543052}  Malnutrition Diagnosis: {RDNMalnutritiondiagnosis:063679}  Malnutrition Present on Admission: {RDNMalnutritionpresentonadmit:660938}    EVALUATION OF THE PROGRESS TOWARD GOALS   Previous Goals  Patient to consume % of nutritionally adequate meal trays TID, or the equivalent with supplements/snacks.  Evaluation: {RDNpreviousgoalevaluation:168652}    Previous Nutrition Diagnosis  Inadequate oral intake related to loss of mobility as evidenced by pt self report, possible weight loss  Evaluation: {RDNpreviousnutritiondiagnosisevaluation:260723}    NUTRITION DIAGNOSIS  {RDNNutritiondiagnosis:836067} related to *** as evidenced by ***    INTERVENTIONS  {RDNInterventions:485941}    GOALS  {RDNGoals:115764}    "  MONITORING/EVALUATION  Progress toward goals will be monitored and evaluated per policy.    Yaw Nunez, RD, LD  Available on Vocera  Weekend/Holiday RD [employer] - \"Weekend Clinical Dietitian\"   "

## 2025-06-13 ENCOUNTER — APPOINTMENT (OUTPATIENT)
Dept: PHYSICAL THERAPY | Facility: CLINIC | Age: 75
End: 2025-06-13
Payer: COMMERCIAL

## 2025-06-13 ENCOUNTER — APPOINTMENT (OUTPATIENT)
Dept: OCCUPATIONAL THERAPY | Facility: CLINIC | Age: 75
End: 2025-06-13
Payer: COMMERCIAL

## 2025-06-13 LAB
ANION GAP SERPL CALCULATED.3IONS-SCNC: 11 MMOL/L (ref 7–15)
BUN SERPL-MCNC: 36.7 MG/DL (ref 8–23)
CALCIUM SERPL-MCNC: 9.5 MG/DL (ref 8.8–10.4)
CHLORIDE SERPL-SCNC: 93 MMOL/L (ref 98–107)
CREAT SERPL-MCNC: 1.14 MG/DL (ref 0.67–1.17)
EGFRCR SERPLBLD CKD-EPI 2021: 67 ML/MIN/1.73M2
GLUCOSE SERPL-MCNC: 118 MG/DL (ref 70–99)
HCO3 SERPL-SCNC: 29 MMOL/L (ref 22–29)
POTASSIUM SERPL-SCNC: 4.8 MMOL/L (ref 3.4–5.3)
POTASSIUM SERPL-SCNC: 5.4 MMOL/L (ref 3.4–5.3)
SODIUM SERPL-SCNC: 133 MMOL/L (ref 135–145)
TROPONIN T SERPL HS-MCNC: 22 NG/L
TROPONIN T SERPL HS-MCNC: 23 NG/L

## 2025-06-13 PROCEDURE — 250N000013 HC RX MED GY IP 250 OP 250 PS 637

## 2025-06-13 PROCEDURE — 99232 SBSQ HOSP IP/OBS MODERATE 35: CPT | Mod: GC

## 2025-06-13 PROCEDURE — 250N000013 HC RX MED GY IP 250 OP 250 PS 637: Performed by: FAMILY MEDICINE

## 2025-06-13 PROCEDURE — 84132 ASSAY OF SERUM POTASSIUM: CPT

## 2025-06-13 PROCEDURE — 250N000009 HC RX 250

## 2025-06-13 PROCEDURE — 97110 THERAPEUTIC EXERCISES: CPT | Mod: GP

## 2025-06-13 PROCEDURE — 36415 COLL VENOUS BLD VENIPUNCTURE: CPT

## 2025-06-13 PROCEDURE — 97530 THERAPEUTIC ACTIVITIES: CPT | Mod: GO

## 2025-06-13 PROCEDURE — 84484 ASSAY OF TROPONIN QUANT: CPT

## 2025-06-13 PROCEDURE — 120N000002 HC R&B MED SURG/OB UMMC

## 2025-06-13 PROCEDURE — 97116 GAIT TRAINING THERAPY: CPT | Mod: GP

## 2025-06-13 PROCEDURE — 93005 ELECTROCARDIOGRAM TRACING: CPT

## 2025-06-13 PROCEDURE — 258N000003 HC RX IP 258 OP 636

## 2025-06-13 PROCEDURE — 80048 BASIC METABOLIC PNL TOTAL CA: CPT

## 2025-06-13 RX ORDER — WARFARIN SODIUM 5 MG/1
10 TABLET ORAL
Status: COMPLETED | OUTPATIENT
Start: 2025-06-13 | End: 2025-06-13

## 2025-06-13 RX ORDER — METOPROLOL TARTRATE 1 MG/ML
5 INJECTION, SOLUTION INTRAVENOUS ONCE
Status: COMPLETED | OUTPATIENT
Start: 2025-06-13 | End: 2025-06-13

## 2025-06-13 RX ADMIN — Medication 25 MCG: at 08:10

## 2025-06-13 RX ADMIN — METOPROLOL TARTRATE 5 MG: 1 INJECTION, SOLUTION INTRAVENOUS at 10:12

## 2025-06-13 RX ADMIN — ATORVASTATIN CALCIUM 40 MG: 40 TABLET, FILM COATED ORAL at 20:48

## 2025-06-13 RX ADMIN — Medication 12.5 MG: at 10:50

## 2025-06-13 RX ADMIN — TORSEMIDE 100 MG: 100 TABLET ORAL at 12:19

## 2025-06-13 RX ADMIN — SPIRONOLACTONE 50 MG: 50 TABLET, FILM COATED ORAL at 08:10

## 2025-06-13 RX ADMIN — METFORMIN HYDROCHLORIDE 500 MG: 500 TABLET, FILM COATED ORAL at 18:06

## 2025-06-13 RX ADMIN — ASPIRIN 81 MG CHEWABLE TABLET 81 MG: 81 TABLET CHEWABLE at 08:10

## 2025-06-13 RX ADMIN — METFORMIN HYDROCHLORIDE 500 MG: 500 TABLET, FILM COATED ORAL at 08:10

## 2025-06-13 RX ADMIN — POTASSIUM CHLORIDE 40 MEQ: 1500 TABLET, EXTENDED RELEASE ORAL at 08:10

## 2025-06-13 RX ADMIN — POLYETHYLENE GLYCOL 3350 17 G: 17 POWDER, FOR SOLUTION ORAL at 08:10

## 2025-06-13 RX ADMIN — SODIUM CHLORIDE, SODIUM LACTATE, POTASSIUM CHLORIDE, AND CALCIUM CHLORIDE 500 ML: .6; .31; .03; .02 INJECTION, SOLUTION INTRAVENOUS at 10:16

## 2025-06-13 RX ADMIN — LEVOTHYROXINE SODIUM 175 MCG: 0.05 TABLET ORAL at 08:10

## 2025-06-13 RX ADMIN — WARFARIN SODIUM 10 MG: 5 TABLET ORAL at 18:06

## 2025-06-13 RX ADMIN — MAGNESIUM OXIDE TAB 400 MG (241.3 MG ELEMENTAL MG) 400 MG: 400 (241.3 MG) TAB at 08:10

## 2025-06-13 ASSESSMENT — ACTIVITIES OF DAILY LIVING (ADL)
ADLS_ACUITY_SCORE: 56

## 2025-06-13 NOTE — PROGRESS NOTES
[facility]    Progress Note - [facility] Medicine Service       Date of Admission:  6/5/2025    Assessment & Plan   Clarke Moreira is a 75 year old male admitted on 6/5/2025. He has PMH of AF on warfarin, hypothyroidism, T2DM, MDD, HTN, HLD, HFpEF who was admitted for worsening weakness, MICHELLE, and hyponatremia, which have improved, now complicated by HDS Afib with RVR responsive to medical management. Currently, working with PT/OT with plans for TCU placement tomorrow.     Main Plans for Today  Stat EKG, BMP, Trop  Stat 1 x Metoprolol Succ 5 mg Injection  500 ml LR IV over 2 hours  Stat Metoprolol Tartrate 12.5 mg BID  1700 hours K check  Need to follow up Trop for delta  Holding Potassium supplement  Patient care order placed for leg skin cares for discharge    #Afib with RVR  #Troponinemia  6/13/25 notable for vitals showing HR in 130s and softer Bps, irregularly irregular on exam, concerning for AFIB with RVR. He is hemodynamically stable and asymptomatic. EKG showed non specific rhythm without clear P waves or ischemic changes or TWI most likely A-fib with higher rates obscuring irregularity. Considering most likely possible lytes derangement vs possible ischemic heart disease but denying symptoms and trop 23 less than admit trop so unlikely pending delta trop, less likely hyperthyroidism (TSH 0.48) vs anemia (hgb 12.7)Treated with metoprolol with return to normal rates and improved BP, consider toprol XL 25 mg on discharge.   -- S/p 1 x Stat Metoprolol lopressor 5 mg Injection  -- Stat 500 ml LR IV fluid bolus over 2 hours  -- 12.5 mg BID Metoprolol Tartrate 12.5 mg BID  -- STAT Troponin, BMP  -- Telemetry    Addendum   2nd Trop hemolyzed. Called lab for collecting repeat trop. Delta Troponin -1, has peaked and fallen, reassuring against ACS, likely had demand ischemia leading to elevation.     #Generalized weakness, improving   #Recurrent  Falls  #Peripheral Neuropathy  Has multiple previous admissions for similar situations in late 2024. Presented with two weeks of increased weakness and two recent falls, causing him to be essentially bed-bound. No acute fractures found on admission. Suspect falls are likely from combination of deconditioning and muscle weakness, BLE edema (currently well managed), and diabetic neuropathy.   -- Continue working with PT/OT   - Discharge to TCU appropriate as patient has demonstrated improvement with PT/OT    #Unsafe living environment  #Discharge planning  As above, home environment was not meeting nutritional nor PT/OT therapeutic needs after past TCU stay. Reports that his floor is covered in dirt and urine. Has had success with prior therapies at TCU but then returns home and is unable to maintain progress or his desired level of function.    #Hyperkalemia  #MICHELLE, prerenal, resolved   #Hyponatremia, resolved  #Hypokalemia, resolved   Cr on admit 1.42, baseline appears to be around 1.0. Sodium 126 on admission with baseline ~135. Here, has fluctuated between hyponatremia and hypernatremia throughout admission, which is consistent with his history.  Hypokalemia and hyponatremia likely 2/2 torsemide, and now developing mild hyperkalemia without signs of waveform distortion on EKG. Will hold potassium supplement and monitor K today. Consider restart of potassium supplement to once daily instead of BID on discharge given torsemide use.   -- Holding select PTA meds as below  -- Avoid nephrotoxic drugs as able  -- BMP q48h  -- Hold Potassium Chloride ER 40 mEq BID  -- 1700 hour K check    #Moderate malnutrition in the context of acute illness   Reports only having two true meals in the week prior to admission. Lost 19% of body weight in the last month. Previously got meals with [employer] [facility]. Initially had concern for refeeding with some electrolyte abnormalities while restarting diet, but has been stable for many  days.    #Abdominal pain, left lateral, resolved   #Constipation   Presented with 1.5 weeks abdominal pain, which patient suspected was 2/2 constipation. CT A/P on admit showed cholelithiasis without cholecystitis, non-obstructive nephrolithiasis and moderate-large colonic stool burden without signs of obstruction. Since admission has had multiple bowel movements with improvement to his abdominal pain.   -- Miralax BID PRN  -- Senna BID PRN     #HFpEF without current exacerbation  #Chronic AF on Warfarin  #HTN  #HLD  #Chronic BLE Edema, stable  On admit, troponin mildly elevated at 33 -> 32,  (improved from 1044 on 5/7/25). Most recent Echo from 2024 with EF 55-60%. No current concern for exacerbation, overall dry on exam. He was seen in HFpEF clinic on 5/7/25 where edema and weakness was discussed. His BB was discontinued and he was started on Chlorthalidone. Bilateral LE edema currently better than it had been per patient, improved since cardiology adjusted medications. . Semaglutide, SGLT2i, and Diltiazem were possible options going forward as outpatient (patient previously declined starting SGLT2i in 3/2024).    -- HOLD PTA Chlorthalidone 25mg daily (pressures stable without, edema well controlled)  -- Hold PTA Potassium 40meq BID   -- PTA Mg oxide 400mg daily  -- PTA Torsemide 100mg  -- PTA Spironolactone 50mg daily   -- PTA ASA 81 daily  -- PTA Atorvastatin 40mg daily  -- Pharmacy to dose warfarin  -- PT/OT  -- Compression stockings   -- Patient care orders for Q1-2 day b/l skin and compression stocking regimen     #T2DM  #Peripheral Neuropathy  A1C on 3/26/25 6.2, stable. Home regimen is Metformin 500mg BID. Was prescribed Ozempic but has yet to start it, wanting to wait until follow up appt with PCP in June. He follows with podiatry and was seen on 5/21/25 with no active ulcerations and sensations diminished bilaterally. Planning to see again in 12 weeks.  -- PTA Metformin 500 mg BID  -- BMP q48h to  "monitor glucose, no need for more frequent checks  -- Hypoglycemia protocol     #Hypothyroidism  TSH on admit 0.48. WNL in 10/2024.  -- PTA Synthroid 175mcg daily      #MDD  No chronic medications. Plans to follow up with Dr. Buenrostro outpatient. Next schedule appointment on 6/16/25.            Diet: Snacks/Supplements Adult: Ensure Max Protein (bariatric); With Meals  Combination Diet Regular Diet; Low Saturated Fat Na <2400mg Diet  Diet    DVT Prophylaxis: Warfarin  Bazzi Catheter: Not present  Fluids: PO   Lines: None     Cardiac Monitoring: None  Code Status: No CPR- Do NOT Intubate      Clinically Significant Risk Factors         # Hyponatremia: Lowest Na = 131 mmol/L in last 2 days, will monitor as appropriate  # Hypochloremia: Lowest Cl = 93 mmol/L in last 2 days, will monitor as appropriate          # Hypertension: Noted on problem list    # Chronic heart failure with preserved ejection fraction: heart failure noted on problem list and last echo with EF >50%           # Morbid Obesity: Estimated body mass index is 45 kg/m  as calculated from the following:    Height as of this encounter: 1.803 m (5' 10.98\").    Weight as of this encounter: 146.3 kg (322 lb 8.5 oz).   # Moderate Malnutrition: based on nutrition assessment and treatment provided per dietitian's recommendations.      # Financial/Environmental Concerns:           Social Drivers of Health   Housing Stability: Low Risk  (6/6/2025)    Housing Stability     Do you have housing? : Yes     Are you worried about losing your housing?: No   Recent Concern: Housing Stability - High Risk (3/26/2025)    Housing Stability     Do you have housing? : Yes     Are you worried about losing your housing?: Yes   Physical Activity: Inactive (3/26/2025)    Exercise Vital Sign     Days of Exercise per Week: 0 days     Minutes of Exercise per Session: 0 min   Social Connections: Unknown (3/26/2025)    Social Connection and Isolation Panel [NHANES]     Frequency of " Social Gatherings with Friends and Family: Patient declined         Disposition Plan     Medically Ready for Discharge: Anticipated Tomorrow, pending placement          The patient's care was discussed with the Attending Physician, Dr. George.    Von Hancock MD  [facility]  [facility]  Securely message with [employer] (more info)  Text page via [employer] Paging/Directory   See signed in provider for up to date coverage information  ______________________________________________________________________    Interval History   No acute events overnight. The patient was found to have HR to 131 on vitals check this morning while using the commode. He reports feeling well. Denies fevers, chest pain, shortness of breath, lightheadedness, dizziness, leg swelling, abdominal pain, urination issues.     Of note, he is concerned about issues with facilities not attending to his foot and leg cares with regular Q1-2 day skin cleaning, emollient application and drying follow up by compression stocking replacement.     Physical Exam   Vital Signs: Temp: 97.9  F (36.6  C) Temp src: Oral BP: 107/69 Pulse: (!) 131   Resp: 20 SpO2: 100 % O2 Device: None (Room air)    Weight: 322 lbs 8.53 oz    General Appearance: No acute distress, well appearing  HENT: NCAT, tachy to moist mucous membranes  Neck: Normal JVD  Respiratory: Normal effort. Clear to ausculation bilaterally, no rales, rhonchi or wheezes   Cardiovascular: RRR, no murmurs, bilateral LE edema   GI: soft, flat, NTND   Extr: trace edema in bilateral lower extremities, wrapped in compression stockings      Medical Decision Making   Please see A&P for additional details of medical decision making.      Data

## 2025-06-13 NOTE — PLAN OF CARE
"Goal Outcome Evaluation:      Plan of Care Reviewed With: patient    Overall Patient Progress: improving    Shift: 7699-4877     VS: /69 (BP Location: Right arm)   Pulse 82   Temp 97.7  F (36.5  C) (Oral)   Resp 18   Ht 1.803 m (5' 10.98\")   Wt (!) 146.3 kg (322 lb 8.5 oz)   SpO2 100%   BMI 45.00 kg/m         Pain: denies  Neuro: A&O x 4  Cardiac: denies chest pain  Respiratory: denies SOB. Refused CPAP overnight.  Diet/Appetite:  low fat diet. Thin liquids takes pills whole  LDA's: L PIV SL  Skin: BLE edema, flaky skin. Scattered bruising and scabs  Activity: A2 walker and GB  GI/: continent x2. Uses urinal and bedside commode.     CHRISTINA stockings applied this morning.    Pt able to make needs known. Call light within reach. Continue with plan of care.           "

## 2025-06-13 NOTE — PLAN OF CARE
Pt alert and oriented times 4 and able to make needs known. Last bowel movement today, continent bowel and bladder, use urinal at bed side. Dry skin and scab on bilateral lower extremity. No acute event at  this time and continue with POC    Goal Outcome Evaluation:      Plan of Care Reviewed With: patient    Overall Patient Progress: improvingOverall Patient Progress: improving       Problem: Adult Inpatient Plan of Care  Goal: Plan of Care Review  Description: The Plan of Care Review/Shift note should be completed every shift.  The Outcome Evaluation is a brief statement about your assessment that the patient is improving, declining, or no change.  This information will be displayed automatically on your shift  note.  Flowsheets (Taken 6/12/2025 2322)  Plan of Care Reviewed With: patient  Overall Patient Progress: improving  Goal: Absence of Hospital-Acquired Illness or Injury  Intervention: Identify and Manage Fall Risk  Recent Flowsheet Documentation  Taken 6/12/2025 1700 by Amilcar Allen RN  Safety Promotion/Fall Prevention:   assistive device/personal items within reach   clutter free environment maintained  Intervention: Prevent Infection  Recent Flowsheet Documentation  Taken 6/12/2025 1700 by Amilcar Allen RN  Infection Prevention: rest/sleep promoted  Goal: Optimal Comfort and Wellbeing  Intervention: Provide Person-Centered Care  Recent Flowsheet Documentation  Taken 6/12/2025 1700 by Amilcar Allen RN  Trust Relationship/Rapport:   care explained   choices provided     Problem: Delirium  Goal: Optimal Coping  Intervention: Optimize Psychosocial Adjustment to Delirium  Recent Flowsheet Documentation  Taken 6/12/2025 1700 by Amilcar Allen RN  Family/Support System Care: self-care encouraged  Goal: Improved Behavioral Control  Intervention: Prevent and Manage Agitation  Recent Flowsheet Documentation  Taken 6/12/2025 1700 by Amilcar Allen RN  Environment Familiarity/Consistency:  daily routine followed  Intervention: Minimize Safety Risk  Recent Flowsheet Documentation  Taken 6/12/2025 1700 by Amilcar Allen RN  Enhanced Safety Measures: room near unit station  Trust Relationship/Rapport:   care explained   choices provided     Problem: Fall Injury Risk  Goal: Absence of Fall and Fall-Related Injury  Intervention: Identify and Manage Contributors  Recent Flowsheet Documentation  Taken 6/12/2025 1700 by Amilcar Allen RN  Medication Review/Management: medications reviewed  Intervention: Promote Injury-Free Environment  Recent Flowsheet Documentation  Taken 6/12/2025 1700 by Amilcar Allen RN  Safety Promotion/Fall Prevention:   assistive device/personal items within reach   clutter free environment maintained     Problem: Pain Acute  Goal: Optimal Pain Control and Function  Intervention: Prevent or Manage Pain  Recent Flowsheet Documentation  Taken 6/12/2025 1700 by Amilcar Allen RN  Medication Review/Management: medications reviewed     Problem: Diabetes  Goal: Optimal Coping  Intervention: Support Wellbeing and Self-Management Success  Recent Flowsheet Documentation  Taken 6/12/2025 1700 by Amilcar Allen RN  Family/Support System Care: self-care encouraged  Goal: Optimal Functional Ability  Intervention: Optimize Functional Ability  Recent Flowsheet Documentation  Taken 6/12/2025 1700 by Amilcar Allen, RN  Activity Management: activity adjusted per tolerance  Activity Assistance Provided: assistance, 1 person

## 2025-06-13 NOTE — PLAN OF CARE
"Goal Outcome Evaluation:     Plan of Care Reviewed With: patient     Overall Patient Progress: improvingOverall      Shift: 7011-4653     VS: /79   Pulse 76   Temp 97.9  F (36.6  C) (Oral)   Resp 20   Ht 1.803 m (5' 10.98\")   Wt (!) 146.3 kg (322 lb 8.5 oz)   SpO2 100%   BMI 45.00 kg/m       Pain: C/o generalized pain; rates pain 4/10  Neuro: A&Ox4. Able to make needs known. Reports baseline n/t upper/lower extremities   Cardiac: Hx A-fib. Denies sob/chest pain. Orders for cardiac monitoring; in A-fib rhythm. Pt asymptomatic. Orders for stat one time dose of IV Metoprolol and LR bolus gave @ 1012.   Respiratory: WDL. On RA. Continuous pulse ox in place  Diet/Appetite: Low fat diet; thin liquids. Takes meds whole   LDA's: Right PIV;SL.  Skin: BLE edema; CHRISTINA stockings in place   Activity: Ax2 with walker and gait belt.   GI/: Continent of bowel and bladder. LBM 6/13    "

## 2025-06-14 ENCOUNTER — APPOINTMENT (OUTPATIENT)
Dept: PHYSICAL THERAPY | Facility: CLINIC | Age: 75
End: 2025-06-14
Payer: COMMERCIAL

## 2025-06-14 LAB
ANION GAP SERPL CALCULATED.3IONS-SCNC: 17 MMOL/L (ref 7–15)
BASE EXCESS BLDV CALC-SCNC: 5.8 MMOL/L (ref -3–3)
BUN SERPL-MCNC: 36.9 MG/DL (ref 8–23)
CALCIUM SERPL-MCNC: 9.2 MG/DL (ref 8.8–10.4)
CHLORIDE SERPL-SCNC: 91 MMOL/L (ref 98–107)
CREAT SERPL-MCNC: 1.14 MG/DL (ref 0.67–1.17)
EGFRCR SERPLBLD CKD-EPI 2021: 67 ML/MIN/1.73M2
ERYTHROCYTE [DISTWIDTH] IN BLOOD BY AUTOMATED COUNT: 13.6 % (ref 10–15)
GLUCOSE SERPL-MCNC: 168 MG/DL (ref 70–99)
HCO3 BLDV-SCNC: 32 MMOL/L (ref 21–28)
HCO3 SERPL-SCNC: 24 MMOL/L (ref 22–29)
HCT VFR BLD AUTO: 39.1 % (ref 40–53)
HGB BLD-MCNC: 12.4 G/DL (ref 13.3–17.7)
INR PPP: 1.89 (ref 0.85–1.15)
LACTATE SERPL-SCNC: 1.4 MMOL/L (ref 0.7–2)
MCH RBC QN AUTO: 26.8 PG (ref 26.5–33)
MCHC RBC AUTO-ENTMCNC: 31.7 G/DL (ref 31.5–36.5)
MCV RBC AUTO: 84 FL (ref 78–100)
O2/TOTAL GAS SETTING VFR VENT: 0 %
OXYHGB MFR BLDV: 46 % (ref 70–75)
PCO2 BLDV: 49 MM HG (ref 40–50)
PH BLDV: 7.41 [PH] (ref 7.32–7.43)
PLATELET # BLD AUTO: 331 10E3/UL (ref 150–450)
PO2 BLDV: 27 MM HG (ref 25–47)
POTASSIUM SERPL-SCNC: 4.1 MMOL/L (ref 3.4–5.3)
PROTHROMBIN TIME: 22.2 SECONDS (ref 11.8–14.8)
RBC # BLD AUTO: 4.63 10E6/UL (ref 4.4–5.9)
SAO2 % BLDV: 45.4 % (ref 70–75)
SODIUM SERPL-SCNC: 132 MMOL/L (ref 135–145)
WBC # BLD AUTO: 8.9 10E3/UL (ref 4–11)

## 2025-06-14 PROCEDURE — 36415 COLL VENOUS BLD VENIPUNCTURE: CPT

## 2025-06-14 PROCEDURE — 99232 SBSQ HOSP IP/OBS MODERATE 35: CPT | Mod: GC

## 2025-06-14 PROCEDURE — 250N000009 HC RX 250

## 2025-06-14 PROCEDURE — 250N000013 HC RX MED GY IP 250 OP 250 PS 637: Performed by: FAMILY MEDICINE

## 2025-06-14 PROCEDURE — 80048 BASIC METABOLIC PNL TOTAL CA: CPT

## 2025-06-14 PROCEDURE — 85014 HEMATOCRIT: CPT

## 2025-06-14 PROCEDURE — 93005 ELECTROCARDIOGRAM TRACING: CPT | Performed by: PHYSICAL THERAPIST

## 2025-06-14 PROCEDURE — 250N000013 HC RX MED GY IP 250 OP 250 PS 637

## 2025-06-14 PROCEDURE — 97530 THERAPEUTIC ACTIVITIES: CPT | Mod: GP | Performed by: PHYSICAL THERAPIST

## 2025-06-14 PROCEDURE — 82805 BLOOD GASES W/O2 SATURATION: CPT

## 2025-06-14 PROCEDURE — 83605 ASSAY OF LACTIC ACID: CPT

## 2025-06-14 PROCEDURE — 85610 PROTHROMBIN TIME: CPT

## 2025-06-14 PROCEDURE — 120N000002 HC R&B MED SURG/OB UMMC

## 2025-06-14 RX ORDER — METOPROLOL TARTRATE 1 MG/ML
5 INJECTION, SOLUTION INTRAVENOUS ONCE
Status: DISCONTINUED | OUTPATIENT
Start: 2025-06-14 | End: 2025-06-14

## 2025-06-14 RX ORDER — METOPROLOL TARTRATE 1 MG/ML
5 INJECTION, SOLUTION INTRAVENOUS ONCE
Status: COMPLETED | OUTPATIENT
Start: 2025-06-14 | End: 2025-06-14

## 2025-06-14 RX ORDER — POTASSIUM CHLORIDE 1500 MG/1
40 TABLET, EXTENDED RELEASE ORAL DAILY
Status: DISCONTINUED | OUTPATIENT
Start: 2025-06-14 | End: 2025-06-15 | Stop reason: HOSPADM

## 2025-06-14 RX ADMIN — METOPROLOL TARTRATE 5 MG: 1 INJECTION, SOLUTION INTRAVENOUS at 14:34

## 2025-06-14 RX ADMIN — METFORMIN HYDROCHLORIDE 500 MG: 500 TABLET, FILM COATED ORAL at 07:52

## 2025-06-14 RX ADMIN — Medication 25 MCG: at 07:51

## 2025-06-14 RX ADMIN — ASPIRIN 81 MG CHEWABLE TABLET 81 MG: 81 TABLET CHEWABLE at 07:52

## 2025-06-14 RX ADMIN — MAGNESIUM OXIDE TAB 400 MG (241.3 MG ELEMENTAL MG) 400 MG: 400 (241.3 MG) TAB at 07:52

## 2025-06-14 RX ADMIN — Medication 37.5 MG: at 20:31

## 2025-06-14 RX ADMIN — SPIRONOLACTONE 50 MG: 50 TABLET, FILM COATED ORAL at 07:52

## 2025-06-14 RX ADMIN — Medication 12.5 MG: at 07:52

## 2025-06-14 RX ADMIN — Medication 12.5 MG: at 10:40

## 2025-06-14 RX ADMIN — TORSEMIDE 100 MG: 100 TABLET ORAL at 12:35

## 2025-06-14 RX ADMIN — WARFARIN SODIUM 11 MG: 10 TABLET ORAL at 17:46

## 2025-06-14 RX ADMIN — ATORVASTATIN CALCIUM 40 MG: 40 TABLET, FILM COATED ORAL at 20:32

## 2025-06-14 RX ADMIN — POTASSIUM CHLORIDE 40 MEQ: 1500 TABLET, EXTENDED RELEASE ORAL at 10:40

## 2025-06-14 RX ADMIN — LEVOTHYROXINE SODIUM 175 MCG: 0.05 TABLET ORAL at 07:52

## 2025-06-14 RX ADMIN — METFORMIN HYDROCHLORIDE 500 MG: 500 TABLET, FILM COATED ORAL at 17:46

## 2025-06-14 ASSESSMENT — ACTIVITIES OF DAILY LIVING (ADL)
ADLS_ACUITY_SCORE: 61
ADLS_ACUITY_SCORE: 61
ADLS_ACUITY_SCORE: 56
ADLS_ACUITY_SCORE: 60
ADLS_ACUITY_SCORE: 56
ADLS_ACUITY_SCORE: 61
ADLS_ACUITY_SCORE: 60
ADLS_ACUITY_SCORE: 56
ADLS_ACUITY_SCORE: 60
ADLS_ACUITY_SCORE: 60
ADLS_ACUITY_SCORE: 56
ADLS_ACUITY_SCORE: 60
ADLS_ACUITY_SCORE: 56

## 2025-06-14 NOTE — PROGRESS NOTES
Care Management Follow Up  Length of Stay (days): 9    Expected Discharge Date: 06/15/2025     Concerns to be Addressed: discharge planning     Patient plan of care discussed at interdisciplinary rounds: No    Anticipated Discharge Disposition: Transitional Care     [facility]  [facility] Referral: [phone]  Facility: [phone]     Anticipated Discharge Services:  (Post Acute Therapies)  Anticipated Discharge DME: None    Patient/family educated on Medicare website which has current facility and service quality ratings:  (Offered list, patient declined)  Education Provided on the Discharge Plan: Yes  Patient/Family in Agreement with the Plan: yes    Referrals Placed by CM/SW: Post Acute Facilities  Private pay costs discussed: transportation costs    Discussed  Partnership in Safe Discharge Planning  document with patient/family: No     Handoff Completed: Yes, [facility] PCP: Internal handoff referral completed    Additional Information:    Per provider during rounds, patient is not ready to discharge today.     SW called [facility] and had to leave a message with tentative discharge plan of Sunday. MERVAT also informed staff on at [facility] directly.     Ride rescheduled, see information below.     [facility] Ph: [phone]  Transport Type:Wheel Chair  Indication/Need: moderate/severe pain on movement    Ride Date: 6/15 Window Scheduled: 10:40 - 11:20   Private Pay Cost Discussed: n/a  PCS Completed:N/A    Next Steps:   Give Report # [phone] to bedside RN  Fax discharge orders to [phone]    AARON Capellan  Weekend Covering   [facility] Acute Care Management  Searchable on Vocera: 6 Med Surg SW, 8 Med Surg SW, 10 ICU SW            [facility]    Medicine Progress Note - Hospitalist Service       Date of Admission:  3/22/2021  Assessment & Plan       Cholelithiasis  Cholecystitis  Choledocholithiasis  Elevated LFTs  Presents with epigastric abdominal pain along with nausea/vomiting. She is afebrile.  Ultrasound of her abdomen showed cholelithiasis with a thickened gallbladder wall and positive sonographic Magana sign, worrisome for cholecystitis.  She is otherwise hemodynamically stable, nonseptic appearing.  -Admit to inpatient.  -Gastroenterology consulted.  -General surgery consulted.  -Pain control as needed.  -NPO.  -IV fluids.  -LFTs trending down this morning.  -S/p EUS and ERCP with sphincterotomy and stone extraction on 3/23/21.  -Plan for lap stacia later this morning.  -Further management pending results of surgical intervention.     Attention deficit disorder  Major depressive disorder, single episode, mild (H)  Anxiety  -Does not appear to be on any medication as an outpatient.  -Stable, follow-up as outpatient.     Diet: Clear Liquid Diet    DVT Prophylaxis: Pneumatic Compression Devices  Yu Catheter: not present  Code Status: Full Code           Disposition Plan   Expected discharge: possibly later today or tomorrow pending lap stacia later today  Entered: Adam Small MD 03/24/2021, 8:58 AM       The patient's care was discussed with the Bedside Nurse and Patient.    dAam Small MD  Hospitalist Service  [facility]  Contact information available via [facility] Paging/Directory    ______________________________________________________________________    Interval History   Christina ORDRIGEZ Ana Luisa feels OK this morning. Had an EUS and ERCP yesterday with sphincterotomy and stone extraction. Tolerated it well. Awaiting lap stacia later this morning. Denies fevers, chest pain, shortness of breath. Minimal nausea and abdominal pain.    Data reviewed today: I reviewed all medications,  new labs and imaging results over the last 24 hours. I personally reviewed no images or EKG's today.    Physical Exam   Vital Signs: Temp: 98.2  F (36.8  C) Temp src: Oral BP: 134/86 Pulse: 64   Resp: 16 SpO2: 96 % O2 Device: None (Room air) Oxygen Delivery: 2 LPM  Weight: 153 lbs 4.8 oz  Constitutional: awake, alert, cooperative, no apparent distress, laying in bed with the head of the bed up  Respiratory: clear to auscultation bilaterally, no crackles or wheezing  Cardiovascular: regular rate and rhythm, normal S1 and S2, no murmur noted  GI: normal bowel sounds, soft, non-distended, mild RUQ tenderness  Skin: warm, dry  Musculoskeletal: no lower extremity pitting edema present  Neurologic: awake, alert, oriented to name, place and time, motor is 5 out of 5 bilaterally, sensory is intact    Data   Recent Labs   Lab 03/24/21  0751 03/23/21  0743 03/22/21  0940   WBC 6.4 5.0 5.9   HGB 11.1* 11.6* 12.0   MCV 82 82 82    176 189   INR  --  1.14  --     142 140   POTASSIUM 3.5 3.6 3.8   CHLORIDE 108 109 106   CO2 27 27 26   BUN 7 8 8   CR 0.75 0.75 0.77   ANIONGAP 5 6 8   RANDAL 8.8 9.1 11.1*   GLC 79 78 89   ALBUMIN 3.2* 3.4 3.8   PROTTOTAL 6.2* 6.4* 7.0   BILITOTAL 1.7* 5.9* 2.8*   ALKPHOS 264* 296* 252*   * 844* 530*   * 719* 1,129*   LIPASE  --   --  148     Medications     lactated ringers 100 mL/hr at 03/24/21 0751     - MEDICATION INSTRUCTIONS -         [Auto Hold] sodium chloride (PF)  3 mL Intracatheter Q8H      No

## 2025-06-14 NOTE — PLAN OF CARE
"Goal Outcome Evaluation:      Plan of Care Reviewed With: patient    Overall Patient Progress: no changeOverall Patient Progress: no change    Shift: 8502-9845  VS:/80 (BP Location: Right arm)   Pulse 87   Temp 99.3  F (37.4  C) (Oral)   Resp 18   Ht 1.803 m (5' 10.98\")   Wt (!) 148 kg (326 lb 4.5 oz)   SpO2 99%   BMI 45.53 kg/m      Pain: Denied pain  Neuro: A&Ox4.Makes needs known  Cardiac: Episodes of Afib with RVR -140s, asymptomatic, denied chest pain. Provider aware and ordered additional oral metoprolol and one time IV metoprolol. HR now 80s-100s.  Respiratory: Denied SOB, on RA  Diet/Appetite: Regular diet.   /GI: Continent of B&B. LBM 6/14  LDA's: L PIV SL  Skin: Swelling bilateral LE. Compression stockings on  Activity: A-2 walker and GB  Plan: Precautions maintained / Continue with plan of care. Call light within reach.      "

## 2025-06-14 NOTE — PROGRESS NOTES
"[facility]    [facility]       Date of Admission:  6/5/2025    Main Plans for Today:  - recurrent Afib with RVR; EKG and increase metoprolol tartrate to 25 mg BID  - New anion gap: check LA and VBG  - Due to above, patient is not medically ready for discharge today. Will optimize Afib treatment and anticipate discharge to TCU 6/15    Assessment & Plan   Clarke Moreira is a 75 year old male admitted on 6/5/2025. He has PMH of AF on warfarin, hypothyroidism, T2DM, MDD, HTN, HLD, HFpEF who was admitted for worsening weakness, MICHELLE, and hyponatremia, which have improved, now complicated by recurrent HDS Afib with RVR responsive to medical management. Currently, working with PT/OT with plans for TCU placement tomorrow.     Afib with RVR  Troponinemia  Hyperkalemia, resolved  For the past two days, patient has developed Afib with RVR (HR 130s) with otherwise stable vitals and asymptomatic. Does have a previous history of Afib and was rate controlled in previous years, but was not on BB prior to admission. Repeat EKG this AM with read of \"septal infarct\" but upon review V1 with very mild elevation (~0.5 mm) without other ischemic changes and patient asymptomatic, reassuring against true septal infarct. Recurrence of Afib unlikely caused by hyperthyroidism (TSH 0.48) or anemia, electrolytes wnl (had mild K elevation on 6/13 with resolution after holding KCl BID supplement). Treated with metoprolol with return to normal rates and improved BP.  - Continue telemetry  - Increase metoprolol tartrate to 25 mg BID  - Restart KCl 40 mEq once daily    Anion gap  New AG of 17 today, though pH on VBG was normal, indicating he does not have metabolic acidosis. Lactic and electrolytes within normal range. No new symptoms concerning for infection. Does have decreased renal clearance. No clear trigger and patient asymptomatic, so will monitor " clinically for now and recheck BMP on 6/15.  - Repeat BMP tomorrow AM    Generalized weakness, improving   Recurrent Falls  Peripheral Neuropathy  Has multiple previous admissions for similar situations in late 2024. Presented with two weeks of increased weakness and two recent falls, causing him to be essentially bed-bound. No acute fractures found on admission. Suspect falls are likely from combination of deconditioning and muscle weakness, BLE edema (currently well managed), and diabetic neuropathy.   -- Continue working with PT/OT   - Discharge to TCU appropriate as patient has demonstrated improvement with PT/OT    Unsafe living environment  Discharge planning  As above, home environment was not meeting nutritional nor PT/OT therapeutic needs after past TCU stay. Reports that his floor is covered in dirt and urine. Has had success with prior therapies at TCU but then returns home and is unable to maintain progress or his desired level of function.    MICHELLE, prerenal, resolved   Hyponatremia, resolved  Hypokalemia, resolved   Cr on admit 1.42, baseline appears to be around 1.0. Sodium 126 on admission with baseline ~135. Here, has fluctuated between hyponatremia and hypernatremia throughout admission, which is consistent with his history.  Hypokalemia and hyponatremia likely 2/2 torsemide.   -- Avoid nephrotoxic drugs as able    Moderate malnutrition in the context of acute illness   Reports only having two true meals in the week prior to admission. Lost 19% of body weight in the last month. Previously got meals with [facility]. Initially had concern for refeeding with some electrolyte abnormalities while restarting diet, but has been stable for many days.    Abdominal pain, left lateral, resolved   Constipation   Presented with 1.5 weeks abdominal pain, which patient suspected was 2/2 constipation. CT A/P on admit showed cholelithiasis without cholecystitis, non-obstructive nephrolithiasis and moderate-large  colonic stool burden without signs of obstruction. Since admission has had multiple bowel movements with improvement to his abdominal pain.   -- Miralax BID PRN  -- Senna BID PRN     HFpEF without current exacerbation  Chronic AF on Warfarin  HTN  HLD  Chronic BLE Edema, stable  On admit, troponin mildly elevated at 33 -> 32,  (improved from 1044 on 5/7/25). Most recent Echo from 2024 with EF 55-60%. No current concern for exacerbation, overall dry on exam. He was seen in HFpEF clinic on 5/7/25 where edema and weakness was discussed. His BB was discontinued and he was started on Chlorthalidone. Bilateral LE edema currently better than it had been per patient, improved since cardiology adjusted medications. Semaglutide, SGLT2i, and Diltiazem were possible options going forward as outpatient (patient previously declined starting SGLT2i in 3/2024).    -- HOLD PTA Chlorthalidone 25mg daily (pressures stable without, edema well controlled)  -- Restart Potassium 40meq daily (PTA dosing was BID)  -- PTA Mg oxide 400mg daily  -- PTA Torsemide 100mg  -- PTA Spironolactone 50mg daily   -- PTA ASA 81 daily  -- PTA Atorvastatin 40mg daily  -- Pharmacy to dose warfarin  -- PT/OT  -- Compression stockings   -- Patient care orders for Q1-2 day b/l skin and compression stocking regimen     T2DM  Peripheral Neuropathy  A1C on 3/26/25 6.2, stable. Home regimen is Metformin 500mg BID. Was prescribed Ozempic but has yet to start it, wanting to wait until follow up appt with PCP in June. He follows with podiatry and was seen on 5/21/25 with no active ulcerations and sensations diminished bilaterally. Planning to see again in 12 weeks.  -- PTA Metformin 500 mg BID  -- BMP q48h to monitor glucose, no need for more frequent checks  -- Hypoglycemia protocol     Hypothyroidism  TSH on admit 0.48. WNL in 10/2024.  -- PTA Synthroid 175mcg daily      MDD  No chronic medications. Plans to follow up with Dr. Buenrostro outpatient. Next  "schedule appointment on 6/19/25.            Diet: Snacks/Supplements Adult: Ensure Max Protein (bariatric); With Meals  Combination Diet Regular Diet; Low Saturated Fat Na <2400mg Diet  Diet    DVT Prophylaxis: Warfarin  Bazzi Catheter: Not present  Fluids: PO   Lines: None     Cardiac Monitoring: ACTIVE order. Indication: Tachyarrhythmias, acute (48 hours)  Code Status: No CPR- Do NOT Intubate      Clinically Significant Risk Factors        # Hyperkalemia: Highest K = 5.4 mmol/L in last 2 days, will monitor as appropriate  # Hyponatremia: Lowest Na = 132 mmol/L in last 2 days, will monitor as appropriate  # Hypochloremia: Lowest Cl = 91 mmol/L in last 2 days, will monitor as appropriate          # Hypertension: Noted on problem list    # Chronic heart failure with preserved ejection fraction: heart failure noted on problem list and last echo with EF >50%           # Morbid Obesity: Estimated body mass index is 45.53 kg/m  as calculated from the following:    Height as of this encounter: 1.803 m (5' 10.98\").    Weight as of this encounter: 148 kg (326 lb 4.5 oz).   # Moderate Malnutrition: based on nutrition assessment and treatment provided per dietitian's recommendations.      # Financial/Environmental Concerns:           Social Drivers of Health   Housing Stability: Low Risk  (6/6/2025)    Housing Stability     Do you have housing? : Yes     Are you worried about losing your housing?: No   Recent Concern: Housing Stability - High Risk (3/26/2025)    Housing Stability     Do you have housing? : Yes     Are you worried about losing your housing?: Yes   Physical Activity: Inactive (3/26/2025)    Exercise Vital Sign     Days of Exercise per Week: 0 days     Minutes of Exercise per Session: 0 min   Social Connections: Unknown (3/26/2025)    Social Connection and Isolation Panel [NHANES]     Frequency of Social Gatherings with Friends and Family: Patient declined         Disposition Plan     Medically Ready for " Discharge: Anticipated Tomorrow       The patient's care was discussed with the Attending Physician, Dr. Barragan.    Hari Dickson MD  [facility]  [facility]  Securely message with [employer] (more info)  Text page via [email or web address] Paging/Directory   See signed in provider for up to date coverage information  ______________________________________________________________________    Interval History   No acute events overnight. This morning, he reports that he is feeling well despite again having Afib with RVR to 130s. Denies dizziness, shortness of breath, chest pain, nausea/vomiting, diaphoresis.    Physical Exam   Vital Signs: Temp: 98  F (36.7  C) Temp src: Oral BP: (!) 105/91 Pulse: 101   Resp: 18 SpO2: 99 % O2 Device: None (Room air)    Weight: 326 lbs 4.49 oz    General Appearance: No acute distress, well appearing  HENT: NCAT, moist mucous membranes  Respiratory: Normal effort. Clear to ausculation bilaterally, no rales, rhonchi or wheezes   Cardiovascular: Irregular rate and rhythm with tachycardia, no murmurs  GI: soft, flat, NTND   Extr: trace edema in bilateral lower extremities, wrapped in compression stockings      Medical Decision Making   Please see A&P for additional details of medical decision making.      Data     I have personally reviewed the following data over the past 24 hrs:    8.9  \   12.4 (L)   / 331     132 (L) 91 (L) 36.9 (H) /  168 (H)   4.1 24 1.14 \     Trop: 22 BNP: N/A     Procal: N/A CRP: N/A Lactic Acid: 1.4       INR:  1.89 (H) PTT:  N/A   D-dimer:  N/A Fibrinogen:  N/A

## 2025-06-14 NOTE — PROGRESS NOTES
Brief Progress Note      While monitoring patient's telemetry, noted Afib with RVR (-150s) for majority of time from 1687-9957. He remained hemodynamically stable with /99. Patient remained asymptomatic during this time and was working with physical therapy, and when stopping to rest, his HR decreased to 95 bpm, so IV metoprolol was not administered.  However, HR increased again and continued to be elevated in the 150s even after patient had completed therapy and was resting in bed, so IV metoprolol was administered at 1434 with appropriate reduction in HR to 80-90 bpm. Blood pressure remains stable. His PO metoprolol tartrate was increased this AM to 25 mg BID.    Plan:  - s/p 5 mg IV metoprolol  - Increase PM dose of metoprolol tartrate to 37.5 mg (BID dosing)  - Continue telemetry  - If HR increases again and remains >120 bpm for 30-60 min, would administer IV metoprolol  - If BP becomes unstable or patient becomes persistently hypotensive, would discuss with cardiology re: amiodarone drip.    Hari Dickson MD

## 2025-06-15 ENCOUNTER — LAB REQUISITION (OUTPATIENT)
Dept: LAB | Facility: CLINIC | Age: 75
End: 2025-06-15
Payer: COMMERCIAL

## 2025-06-15 VITALS
BODY MASS INDEX: 44.1 KG/M2 | RESPIRATION RATE: 16 BRPM | WEIGHT: 315 LBS | DIASTOLIC BLOOD PRESSURE: 62 MMHG | HEART RATE: 92 BPM | TEMPERATURE: 98.2 F | SYSTOLIC BLOOD PRESSURE: 108 MMHG | OXYGEN SATURATION: 97 % | HEIGHT: 71 IN

## 2025-06-15 DIAGNOSIS — Z11.1 ENCOUNTER FOR SCREENING FOR RESPIRATORY TUBERCULOSIS: ICD-10-CM

## 2025-06-15 LAB
ANION GAP SERPL CALCULATED.3IONS-SCNC: 9 MMOL/L (ref 7–15)
BUN SERPL-MCNC: 33 MG/DL (ref 8–23)
CALCIUM SERPL-MCNC: 8.7 MG/DL (ref 8.8–10.4)
CHLORIDE SERPL-SCNC: 95 MMOL/L (ref 98–107)
CREAT SERPL-MCNC: 1.05 MG/DL (ref 0.67–1.17)
EGFRCR SERPLBLD CKD-EPI 2021: 74 ML/MIN/1.73M2
ERYTHROCYTE [DISTWIDTH] IN BLOOD BY AUTOMATED COUNT: 13.8 % (ref 10–15)
GLUCOSE SERPL-MCNC: 160 MG/DL (ref 70–99)
HCO3 SERPL-SCNC: 27 MMOL/L (ref 22–29)
HCT VFR BLD AUTO: 34.9 % (ref 40–53)
HGB BLD-MCNC: 10.9 G/DL (ref 13.3–17.7)
INR PPP: 1.87 (ref 0.85–1.15)
MCH RBC QN AUTO: 25.9 PG (ref 26.5–33)
MCHC RBC AUTO-ENTMCNC: 31.2 G/DL (ref 31.5–36.5)
MCV RBC AUTO: 83 FL (ref 78–100)
PLATELET # BLD AUTO: 262 10E3/UL (ref 150–450)
POTASSIUM SERPL-SCNC: 3.9 MMOL/L (ref 3.4–5.3)
PROTHROMBIN TIME: 21.9 SECONDS (ref 11.8–14.8)
RBC # BLD AUTO: 4.21 10E6/UL (ref 4.4–5.9)
SODIUM SERPL-SCNC: 131 MMOL/L (ref 135–145)
WBC # BLD AUTO: 5.8 10E3/UL (ref 4–11)

## 2025-06-15 PROCEDURE — 85014 HEMATOCRIT: CPT

## 2025-06-15 PROCEDURE — 85610 PROTHROMBIN TIME: CPT

## 2025-06-15 PROCEDURE — 80048 BASIC METABOLIC PNL TOTAL CA: CPT

## 2025-06-15 PROCEDURE — 250N000013 HC RX MED GY IP 250 OP 250 PS 637

## 2025-06-15 PROCEDURE — 99239 HOSP IP/OBS DSCHRG MGMT >30: CPT | Mod: GC

## 2025-06-15 PROCEDURE — 36415 COLL VENOUS BLD VENIPUNCTURE: CPT

## 2025-06-15 RX ORDER — DILTIAZEM HYDROCHLORIDE 30 MG/1
30 TABLET, FILM COATED ORAL EVERY 6 HOURS SCHEDULED
Status: DISCONTINUED | OUTPATIENT
Start: 2025-06-15 | End: 2025-06-15 | Stop reason: HOSPADM

## 2025-06-15 RX ORDER — TORSEMIDE 20 MG/1
100 TABLET ORAL DAILY
DISCHARGE
Start: 2025-06-15

## 2025-06-15 RX ORDER — POTASSIUM CHLORIDE 1500 MG/1
40 TABLET, EXTENDED RELEASE ORAL DAILY
DISCHARGE
Start: 2025-06-15

## 2025-06-15 RX ORDER — DILTIAZEM HYDROCHLORIDE 30 MG/1
30 TABLET, FILM COATED ORAL 4 TIMES DAILY
DISCHARGE
Start: 2025-06-15

## 2025-06-15 RX ORDER — METOPROLOL TARTRATE 50 MG
50 TABLET ORAL 2 TIMES DAILY
DISCHARGE
Start: 2025-06-15 | End: 2025-06-15

## 2025-06-15 RX ORDER — METOPROLOL TARTRATE 50 MG
50 TABLET ORAL 2 TIMES DAILY
Status: DISCONTINUED | OUTPATIENT
Start: 2025-06-15 | End: 2025-06-15

## 2025-06-15 RX ADMIN — MAGNESIUM OXIDE TAB 400 MG (241.3 MG ELEMENTAL MG) 400 MG: 400 (241.3 MG) TAB at 08:09

## 2025-06-15 RX ADMIN — LEVOTHYROXINE SODIUM 175 MCG: 0.05 TABLET ORAL at 08:09

## 2025-06-15 RX ADMIN — POTASSIUM CHLORIDE 40 MEQ: 1500 TABLET, EXTENDED RELEASE ORAL at 08:09

## 2025-06-15 RX ADMIN — Medication 25 MCG: at 08:09

## 2025-06-15 RX ADMIN — SPIRONOLACTONE 50 MG: 50 TABLET, FILM COATED ORAL at 08:09

## 2025-06-15 RX ADMIN — METOPROLOL TARTRATE 50 MG: 50 TABLET, FILM COATED ORAL at 08:09

## 2025-06-15 RX ADMIN — METFORMIN HYDROCHLORIDE 500 MG: 500 TABLET, FILM COATED ORAL at 08:09

## 2025-06-15 RX ADMIN — ASPIRIN 81 MG CHEWABLE TABLET 81 MG: 81 TABLET CHEWABLE at 08:09

## 2025-06-15 ASSESSMENT — ACTIVITIES OF DAILY LIVING (ADL)
ADLS_ACUITY_SCORE: 61

## 2025-06-15 NOTE — PLAN OF CARE
"Goal Outcome Evaluation:      Plan of Care Reviewed With: patient    Overall Patient Progress: improving    Outcome Evaluation: pt stable overnight. no acute change.      Problem: Adult Inpatient Plan of Care  Goal: Plan of Care Review  Description: The Plan of Care Review/Shift note should be completed every shift.  The Outcome Evaluation is a brief statement about your assessment that the patient is improving, declining, or no change.  This information will be displayed automatically on your shift  note.  Outcome: Progressing  Flowsheets (Taken 6/15/2025 0676)  Outcome Evaluation: pt stable overnight. no acute change.  Plan of Care Reviewed With: patient  Overall Patient Progress: improving  Goal: Patient-Specific Goal (Individualized)  Description: You can add care plan individualizations to a care plan. Examples of Individualization might be:  \"Parent requests to be called daily at 9am for status\", \"I have a hard time hearing out of my right ear\", or \"Do not touch me to wake me up as it startles  me\".  Outcome: Progressing  Goal: Absence of Hospital-Acquired Illness or Injury  Outcome: Progressing  Intervention: Identify and Manage Fall Risk  Recent Flowsheet Documentation  Taken 6/14/2025 2350 by Janice Phan RN  Safety Promotion/Fall Prevention:   assistive device/personal items within reach   clutter free environment maintained   lighting adjusted   nonskid shoes/slippers when out of bed   room near nurse's station   safety round/check completed  Intervention: Prevent Skin Injury  Recent Flowsheet Documentation  Taken 6/14/2025 2350 by Janice Phan RN  Body Position:   foot of bed elevated   supine, head elevated  Intervention: Prevent Infection  Recent Flowsheet Documentation  Taken 6/14/2025 2350 by Janice Phan RN  Infection Prevention:   hand hygiene promoted   single patient room provided  Goal: Optimal Comfort and Wellbeing  Outcome: Progressing  Goal: Readiness for Transition of " Care  Outcome: Progressing     Problem: Delirium  Goal: Optimal Coping  Outcome: Progressing  Goal: Improved Behavioral Control  Outcome: Progressing  Intervention: Minimize Safety Risk  Recent Flowsheet Documentation  Taken 6/14/2025 2350 by Janice Phan RN  Enhanced Safety Measures: room near unit station  Goal: Improved Attention and Thought Clarity  Outcome: Progressing  Goal: Improved Sleep  Outcome: Progressing     Problem: Fall Injury Risk  Goal: Absence of Fall and Fall-Related Injury  Outcome: Progressing  Intervention: Identify and Manage Contributors  Recent Flowsheet Documentation  Taken 6/14/2025 2350 by Janice Phan RN  Medication Review/Management: medications reviewed  Intervention: Promote Injury-Free Environment  Recent Flowsheet Documentation  Taken 6/14/2025 2350 by Janice Phan RN  Safety Promotion/Fall Prevention:   assistive device/personal items within reach   clutter free environment maintained   lighting adjusted   nonskid shoes/slippers when out of bed   room near nurse's station   safety round/check completed     Problem: Pain Acute  Goal: Optimal Pain Control and Function  Outcome: Progressing  Intervention: Prevent or Manage Pain  Recent Flowsheet Documentation  Taken 6/14/2025 2350 by Janice Phan RN  Medication Review/Management: medications reviewed     Problem: Diabetes  Goal: Optimal Coping  Outcome: Progressing  Goal: Optimal Functional Ability  Outcome: Progressing  Goal: Blood Glucose Level Within Target Range  Outcome: Progressing  Goal: Minimize Risk of Hypoglycemia  Outcome: Progressing

## 2025-06-15 NOTE — DISCHARGE INSTRUCTIONS
For your swelling and diuretic plan:    - Continue torsemide 100 mg daily  - We are holding (not giving) the chlorthalidone 25 mg daily  - If noticing increased swelling, may need to consider restarting the chlorthalidone AND ALSO use torsemide 80 mg daily PRN  - Dr. Reddy (Clarke's cardiologist) had following plan on 5/7/25: chlorthalidone 25 mg daily and torsemide 80 mg daily PRN for swelling, weight gain, SOB   - However, did not resume this exact plan because Clarke wanted some consistency in his medication regimen, and we were changing his Afib meds    For Afib: diltiazem 30 mg four times daily, can increase as needed

## 2025-06-15 NOTE — PROGRESS NOTES
Care Management Discharge Note    Discharge Date: 06/15/2025       Discharge Disposition: [facility] Referral: [phone]  Facility: [phone]  Nurse  to Nurse Report:  [phone] nurse to nurse   Discharge Services:  (Post Acute Therapies)    Discharge DME: None    Discharge Transportation:   [facility] Ph: [phone]  Transport Type:Wheel Chair  Indication/Need: moderate/severe pain on movement    Ride Date: 6/15 Window Scheduled: 10:40 - 11:20   Private Pay Cost Discussed: n/a  PCS Completed:N/A    Private pay costs discussed: no-was  not  able to discuss  with Pt  this morning.      Does the patient's insurance plan have a 3 day qualifying hospital stay waiver?  Yes     Which insurance plan 3 day waiver is available? Alternative insurance waiver    Will the waiver be used for post-acute placement? Yes    PAS Confirmation Code:  [id number]  Patient/family educated on Medicare website which has current facility and service quality ratings:  (Offered list, patient declined)    Education Provided on the Discharge Plan: Yes  Persons Notified of Discharge Plans: Pt, bedside nurse,  provider.    Patient/Family in Agreement with the Plan: yes    Handoff Referral Completed: Yes, [facility] PCP: Internal handoff referral completed    Additional Information:  SW requested discharge  orders. SW informed  bedside nurse of  discharge  and nurse  to nurse contact  information.     AARON Garcia  Weekend Covering   [facility] Acute Care Management  Searchable on Vocera: 6 Med Surg SW, 8 Med Surg SW, 10 ICU SW

## 2025-06-15 NOTE — PLAN OF CARE
"Goal Outcome Evaluation:     DISCHARGE    /62 (BP Location: Right arm)   Pulse 92   Temp 98.2  F (36.8  C) (Oral)   Resp 16   Ht 1.803 m (5' 10.98\")   Wt (!) 147.4 kg (324 lb 15.3 oz)   SpO2 97%   BMI 45.34 kg/m      D: Patient discharged to TCU at 1130. [facility].    I: Discharge prescriptions given to patient. All discharge medications and instructions reviewed with patient. Patient instructed to seek care if experiencing worsening symptoms. Other phone numbers to call with questions or concerns after discharge reviewed. PIV removed. Education completed.    A: Patient verbalized understanding of discharge medications and instructions. Prescribed home medications given to patient.  Belongings returned to patient.    P: Patient to follow-up on 6/16/25 with primary provider.                                      "

## 2025-06-16 ENCOUNTER — DOCUMENTATION ONLY (OUTPATIENT)
Dept: GERIATRICS | Facility: CLINIC | Age: 75
End: 2025-06-16

## 2025-06-16 ENCOUNTER — TELEPHONE (OUTPATIENT)
Dept: FAMILY MEDICINE | Facility: CLINIC | Age: 75
End: 2025-06-16

## 2025-06-16 ENCOUNTER — TRANSITIONAL CARE UNIT VISIT (OUTPATIENT)
Dept: GERIATRICS | Facility: CLINIC | Age: 75
End: 2025-06-16
Payer: COMMERCIAL

## 2025-06-16 VITALS
TEMPERATURE: 97.5 F | RESPIRATION RATE: 18 BRPM | WEIGHT: 315 LBS | OXYGEN SATURATION: 96 % | DIASTOLIC BLOOD PRESSURE: 78 MMHG | BODY MASS INDEX: 45.1 KG/M2 | HEART RATE: 79 BPM | HEIGHT: 70 IN | SYSTOLIC BLOOD PRESSURE: 121 MMHG

## 2025-06-16 DIAGNOSIS — R53.81 PHYSICAL DECONDITIONING: ICD-10-CM

## 2025-06-16 DIAGNOSIS — F33.1 MODERATE EPISODE OF RECURRENT MAJOR DEPRESSIVE DISORDER (H): ICD-10-CM

## 2025-06-16 DIAGNOSIS — I48.19 PERSISTENT ATRIAL FIBRILLATION (H): ICD-10-CM

## 2025-06-16 DIAGNOSIS — J96.12 CHRONIC RESPIRATORY FAILURE WITH HYPERCAPNIA (H): ICD-10-CM

## 2025-06-16 DIAGNOSIS — R53.1 GENERALIZED WEAKNESS: ICD-10-CM

## 2025-06-16 DIAGNOSIS — E11.42 TYPE 2 DIABETES MELLITUS WITH DIABETIC POLYNEUROPATHY, WITHOUT LONG-TERM CURRENT USE OF INSULIN (H): ICD-10-CM

## 2025-06-16 DIAGNOSIS — E87.6 HYPOKALEMIA: ICD-10-CM

## 2025-06-16 DIAGNOSIS — I50.32 CHRONIC HEART FAILURE WITH PRESERVED EJECTION FRACTION (H): Primary | ICD-10-CM

## 2025-06-16 DIAGNOSIS — I50.33 ACUTE ON CHRONIC HEART FAILURE WITH PRESERVED EJECTION FRACTION (HFPEF) (H): ICD-10-CM

## 2025-06-16 DIAGNOSIS — Z91.81 RISK FOR FALLS: ICD-10-CM

## 2025-06-16 DIAGNOSIS — I48.91 ATRIAL FIBRILLATION, UNSPECIFIED TYPE (H): ICD-10-CM

## 2025-06-16 DIAGNOSIS — Z79.01 ANTICOAGULATED: ICD-10-CM

## 2025-06-16 PROCEDURE — 36415 COLL VENOUS BLD VENIPUNCTURE: CPT | Mod: ORL | Performed by: NURSE PRACTITIONER

## 2025-06-16 PROCEDURE — P9604 ONE-WAY ALLOW PRORATED TRIP: HCPCS | Mod: ORL | Performed by: NURSE PRACTITIONER

## 2025-06-16 PROCEDURE — 86481 TB AG RESPONSE T-CELL SUSP: CPT | Mod: ORL | Performed by: NURSE PRACTITIONER

## 2025-06-16 PROCEDURE — 99310 SBSQ NF CARE HIGH MDM 45: CPT | Performed by: NURSE PRACTITIONER

## 2025-06-16 NOTE — LETTER
6/16/2025      Clarke Moreira  [address]  [facility] [address]        [facility] GERIATRICS    PRIMARY CARE PROVIDER AND CLINIC:  Henrry Shepard MD, [address] / [facility] [address]  Chief Complaint   Patient presents with     Hospital F/U      [facility] Medical Record Number:  [id number]  Place of Service where encounter took place:  [facility] (TCU) [[id number]]    Clarke Moreira  is a 75 year old  (1950), admitted to the above facility from  [facility]. Hospital stay 6/5/2025 through 6/15/2025..     Patient seen today for initial NP visit in TCU:  1. Chronic heart failure with preserved ejection fraction (H)    2. Risk for falls    3. Acute on chronic heart failure with preserved ejection fraction (HFpEF) (H)    4. Type 2 diabetes mellitus with diabetic polyneuropathy, without long-term current use of insulin (H)    5. Atrial fibrillation, unspecified type (H)    6. Chronic respiratory failure with hypercapnia (H)    7. Physical deconditioning    8. Moderate episode of recurrent major depressive disorder (H)    9. Hypokalemia    10. Anticoagulated    11. Persistent atrial fibrillation (H)    12. Generalized weakness        HPI:    Doing well overall. Chlorthalidone stopped. He is on Eyad and Torsemide.   Weights today 325.6 pounds. Edema is improved.  Denies HA, chest pain, palpitations, dizziness. HR controlled. BP stable so far in TCU. No troubles breathing, no SOB, no Concerns with urination, no constipation. Wants Miralax PRN. Eating and drinking okay. Sleeping okay. Denies pain.   Lives at home in apartment alone, ambulates with walker.      CODE STATUS/ADVANCE DIRECTIVES DISCUSSION:  No CPR- Do NOT Intubate  DNR / DNI-Comfort focus   ALLERGIES:   Allergies   Allergen Reactions     Seasonal Allergies       PAST MEDICAL HISTORY:   Past Medical History:   Diagnosis Date     Atrial fibrillation (H)       Basal cell carcinoma      Chronic anticoagulation      Diastolic heart failure (H)      Dyslipidemia      Essential hypertension      Morbid obesity (H)      Sleep-disordered breathing      Type 2 diabetes mellitus (H)       PAST SURGICAL HISTORY:   has a past surgical history that includes biopsy of skin lesion; Mohs micrographic procedure; and picc insertion (Right, 09/24/2017).  FAMILY HISTORY: family history includes Depression in his mother; Glaucoma in his maternal grandfather.  SOCIAL HISTORY:   reports that he has never smoked. He has never used smokeless tobacco. He reports that he does not drink alcohol and does not use drugs.  Patient's living condition: lives alone    Post Discharge Medication Reconciliation Status:   MED REC REQUIRED  Post Medication Reconciliation Status: discharge medications reconciled, continue medications without change       Current Outpatient Medications   Medication Sig Dispense Refill     aspirin (ASA) 81 MG chewable tablet Take 1 tablet (81 mg) by mouth daily. 200 tablet 2     atorvastatin (LIPITOR) 40 MG tablet Take 1 tablet (40 mg) by mouth daily 90 tablet 3     CERTAVITE/ANTIOXIDANTS tablet TAKE ONE TABLET BY MOUTH ONE TIME DAILY 100 tablet 0     [Paused] chlorthalidone (HYGROTON) 25 MG tablet Take 1 tablet (25 mg) by mouth daily. 90 tablet 11     diltiazem (CARDIZEM) 30 MG tablet Take 1 tablet (30 mg) by mouth 4 times daily.       FEROSUL 325 (65 Fe) MG tablet Take 1 tablet (325 mg) by mouth every other day. For iron deficiency anemia 60 tablet 0     hypromellose (ARTIFICIAL TEARS) 0.5 % SOLN ophthalmic solution Place 1 drop into both eyes 4 times daily as needed for dry eyes.       levothyroxine (SYNTHROID/LEVOTHROID) 175 MCG tablet Take 1 tablet (175 mcg) by mouth every morning (before breakfast) 90 tablet 0     magnesium oxide (MAG-OX) 400 MG tablet Take 1 tablet (400 mg) by mouth daily. 90 tablet 3     metFORMIN (GLUCOPHAGE) 500 MG tablet Take 1 tablet (500 mg) by  "mouth 2 times daily (with meals). 180 tablet 3     Omega-3 Fatty Acids (EQL FISH OIL) 1000 MG CAPS Take 1 capsule by mouth daily. 90 capsule 0     potassium chloride chen ER (KLOR-CON M20) 20 MEQ CR tablet Take 2 tablets (40 mEq) by mouth daily.       senna-docusate (SENEXON-S) 8.6-50 MG tablet Take 1 to 2 tablets by mouth 2 times daily as needed for constipation 180 tablet 0     spironolactone (ALDACTONE) 50 MG tablet Take 1 tablet (50 mg) by mouth daily.       torsemide (DEMADEX) 20 MG tablet Take 5 tablets (100 mg) by mouth daily.       urea (CARMOL) 10 % external lotion Apply topically 2 times daily as needed for dry skin.       vitamin C (ASCORBIC ACID) 1000 MG TABS Take 1,000 mg by mouth daily       Vitamin D, Cholecalciferol, 25 MCG (1000 UT) CAPS Take 1 capsule by mouth daily.       warfarin ANTICOAGULANT (COUMADIN) 3 MG tablet TAKE THREE TABLETS BY MOUTH AT BEDTIME 90 tablet 0     No current facility-administered medications for this visit.       ROS:  10 point ROS of systems including Constitutional, Eyes, Respiratory, Cardiovascular, Gastroenterology, Genitourinary, Integumentary, Musculoskeletal, Psychiatric were all negative except for pertinent positives noted in my HPI.    Vitals:  /78   Pulse 79   Temp 97.5  F (36.4  C)   Resp 18   Ht 1.778 m (5' 10\")   Wt (!) 147.4 kg (325 lb)   SpO2 96%   BMI 46.63 kg/m    Exam:  GENERAL APPEARANCE:  Alert, in no distress, oriented, cooperative. Sitting comfortably in chair.   ENT:  Mouth and posterior oropharynx normal, moist mucous membranes  EYES:  EOM, conjunctivae, lids, pupils and irises normal  NECK:  No adenopathy,masses or thyromegaly  RESP:  respiratory effort and palpation of chest normal, lungs clear to auscultation , no respiratory distress  CV:  Palpation and auscultation of heart done , regular rate and rhythm, no murmur, rub, or gallop, +2 edema to LE, compressions on  GI/ABDOMEN:  normal bowel sounds, soft, nontender, no " hepatosplenomegaly or other masses  M/S:   moves extremities spontaneously. Ambulation not tested   SKIN:  no rashes to exposed skin.  NEURO:   intact   PSYCH:  oriented X 3, normal insight, judgement and memory, affect and mood normal,       Lab/Diagnostic data:  Recent labs in [facility] reviewed by me today.     Last Comprehensive Metabolic Panel:  Lab Results   Component Value Date     (L) 06/15/2025    POTASSIUM 3.9 06/15/2025    CHLORIDE 95 (L) 06/15/2025    CO2 27 06/15/2025    ANIONGAP 9 06/15/2025     (H) 06/15/2025    BUN 33.0 (H) 06/15/2025    CR 1.05 06/15/2025    GFRESTIMATED 74 06/15/2025    CHERI 8.7 (L) 06/15/2025       Lab Results   Component Value Date    WBC 5.8 06/15/2025    WBC 11.1 05/06/2021     Lab Results   Component Value Date    RBC 4.21 06/15/2025    RBC 5.45 05/06/2021     Lab Results   Component Value Date    HGB 10.9 06/15/2025    HGB 16.3 05/06/2021     Lab Results   Component Value Date    HCT 34.9 06/15/2025    HCT 49.4 05/06/2021     Lab Results   Component Value Date    MCV 83 06/15/2025    MCV 91 05/06/2021     Lab Results   Component Value Date    MCH 25.9 06/15/2025    MCH 29.5 05/06/2021     Lab Results   Component Value Date    MCHC 31.2 06/15/2025    MCHC 32.6 05/06/2021     Lab Results   Component Value Date    RDW 13.8 06/15/2025    RDW 13.6 05/06/2021     Lab Results   Component Value Date     06/15/2025     05/06/2021       ASSESSMENT/PLAN:    Generalized weakness  Recurrent Falls  Peripheral Neuropathy  Presented with two weeks of increased weakness and two recent falls in late May/early June, causing him to be essentially bed-bound. No acute fractures found on admission.   Suspect falls are likely from combination of deconditioning and muscle weakness, BLE edema (currently well managed), and diabetic neuropathy.   PT/OT, SW for discharge planning to safe environment      Chronic AF on Warfarin  asymptomatic A-fib with RVR, requiring restart of rate  control with Metoprolol. EKG with rate controlled AF.   Per review of recent cardiology note, his cardiologist wants to avoid beta blockers and the plan was to start diltiazem if rate control needed for his Afib.   - Diltiazem 30 mg QID; may need ongoing titration  - Cards follow up appt scheduled for 7/9  - Labs stable as above  INR today 1.8  Currently on Coumadin 9mg daily at HS. Goal INR 2-3  Recheck INR 6/19     Unsafe living environment  Notes he lives alone, per hospital notes floor covered with food, urine.   SW for discharge planning       Anemia  Hgb trending down slowly over past couple days from 12-13 --> 10.9. No noted bleeding source, no hematochezia or hematemesis, and no noted increased swelling or signs of increased fluid retention on exam.   Continue to monitor      MICHELLE, prerenal, resolved  Hyponatremia, resolved  Hypokalemia, resolved   Cr on admit 1.42, baseline appears to be around 1.0.   Sodium 126 on admission with baseline ~135. Here, has fluctuated between hyponatremia and hypernatremia throughout admission, which is consistent with his history. Hypokalemia and hyponatremia likely 2/2 torsemide and chlorthalidone use.  - F/U Cardiology outpatient   - Continue PTA torsemide 100 mg daily  - HOLDING PTA chlorthalidone (BP and edema well controlled without this med)              - if increased swelling or increased BP, consider restarting  - KCl 40 mEq supplement daily (changed from BID)  Recheck labs stable as above, Na 131      Constipation  Has Senna PRN, will add Miralax PRN      HFpEF without current exacerbation  Chronic AF on Warfarin  HTN  HLD  EF 55-60%.   Follow with HF clinic  BLE edema currently better, continue regular compression stocking use and skin cares.   - HOLD the chlorthalidone 25 mg daily-may need to restart.   -Continue diltiazem 30 mg four times daily, can increase as needed  -Continue K  -- PTA Mg oxide 400mg daily  -- PTA Torsemide 100mg  -- PTA Spironolactone 50mg  daily   -- PTA ASA 81 daily  -- PTA Atorvastatin 40mg daily      T2DM  Peripheral Neuropathy  A1C on 3/26/25 6.2, stable.   Metformin 500mg BID.   Has Ozempic but has not started yet.   He follows with podiatry and was seen on 5/21/25 with no active ulcerations and sensations diminished bilaterally.    -- PTA Metformin   -- Follow up podiatry  -Blood sugars stable in TCU, continue Metformin      Hypothyroidism  TSH on admit 0.48. WNL in 10/2024.  -- Continue PTA Synthroid 175mcg daily      MDD  Follows with therapist outpatient. No chronic medications.   Has follow up with Dr. Buenrostro at [facility], appt 6/19.           Electronically signed by:  Kerri Robins CNP       Total time greater than 55 minutes reviewing chart in EPIC and PCC, medications, labs, vitals. Discussing with social work, physical therapy, occupational therapy and nursing.                   Sincerely,        Kerri Robins CNP    Electronically signed

## 2025-06-16 NOTE — PLAN OF CARE
Occupational Therapy Discharge Summary    Reason for therapy discharge:    Discharged to transitional care facility.    Progress towards therapy goal(s). See goals on Care Plan in [facility] electronic health record for goal details.  Goals partially met.  Barriers to achieving goals:   discharge from facility.    Therapy recommendation(s):    Continued therapy is recommended.  Rationale/Recommendations:  to maximize safety and I with ADL.

## 2025-06-16 NOTE — PROGRESS NOTES
[facility] GERIATRICS    PRIMARY CARE PROVIDER AND CLINIC:  Henrry Shepard MD, [address] [place] [address] / [facility] [address]  Chief Complaint   Patient presents with    Hospital F/U      [facility] Medical Record Number:  [id number]  Place of Service where encounter took place:  [facility] [[id number]]    Clarke Moreira  is a 75 year old  (1950), admitted to the above facility from  [facility]. Hospital stay 6/5/2025 through 6/15/2025..     Patient seen today for initial NP visit in TCU:  1. Chronic heart failure with preserved ejection fraction (H)    2. Risk for falls    3. Acute on chronic heart failure with preserved ejection fraction (HFpEF) (H)    4. Type 2 diabetes mellitus with diabetic polyneuropathy, without long-term current use of insulin (H)    5. Atrial fibrillation, unspecified type (H)    6. Chronic respiratory failure with hypercapnia (H)    7. Physical deconditioning    8. Moderate episode of recurrent major depressive disorder (H)    9. Hypokalemia    10. Anticoagulated    11. Persistent atrial fibrillation (H)    12. Generalized weakness        HPI:    Doing well overall. Chlorthalidone stopped. He is on Eyad and Torsemide.   Weights today 325.6 pounds. Edema is improved.  Denies HA, chest pain, palpitations, dizziness. HR controlled. BP stable so far in TCU. No troubles breathing, no SOB, no Concerns with urination, no constipation. Wants Miralax PRN. Eating and drinking okay. Sleeping okay. Denies pain.   Lives at home in apartment alone, ambulates with walker.      CODE STATUS/ADVANCE DIRECTIVES DISCUSSION:  No CPR- Do NOT Intubate  DNR / DNI-Comfort focus   ALLERGIES:   Allergies   Allergen Reactions    Seasonal Allergies       PAST MEDICAL HISTORY:   Past Medical History:   Diagnosis Date    Atrial fibrillation (H)     Basal cell carcinoma     Chronic anticoagulation     Diastolic heart failure (H)      Dyslipidemia     Essential hypertension     Morbid obesity (H)     Sleep-disordered breathing     Type 2 diabetes mellitus (H)       PAST SURGICAL HISTORY:   has a past surgical history that includes biopsy of skin lesion; Mohs micrographic procedure; and picc insertion (Right, 09/24/2017).  FAMILY HISTORY: family history includes Depression in his mother; Glaucoma in his maternal grandfather.  SOCIAL HISTORY:   reports that he has never smoked. He has never used smokeless tobacco. He reports that he does not drink alcohol and does not use drugs.  Patient's living condition: lives alone    Post Discharge Medication Reconciliation Status:   MED REC REQUIRED  Post Medication Reconciliation Status: discharge medications reconciled, continue medications without change       Current Outpatient Medications   Medication Sig Dispense Refill    aspirin (ASA) 81 MG chewable tablet Take 1 tablet (81 mg) by mouth daily. 200 tablet 2    atorvastatin (LIPITOR) 40 MG tablet Take 1 tablet (40 mg) by mouth daily 90 tablet 3    CERTAVITE/ANTIOXIDANTS tablet TAKE ONE TABLET BY MOUTH ONE TIME DAILY 100 tablet 0    [Paused] chlorthalidone (HYGROTON) 25 MG tablet Take 1 tablet (25 mg) by mouth daily. 90 tablet 11    diltiazem (CARDIZEM) 30 MG tablet Take 1 tablet (30 mg) by mouth 4 times daily.      FEROSUL 325 (65 Fe) MG tablet Take 1 tablet (325 mg) by mouth every other day. For iron deficiency anemia 60 tablet 0    hypromellose (ARTIFICIAL TEARS) 0.5 % SOLN ophthalmic solution Place 1 drop into both eyes 4 times daily as needed for dry eyes.      levothyroxine (SYNTHROID/LEVOTHROID) 175 MCG tablet Take 1 tablet (175 mcg) by mouth every morning (before breakfast) 90 tablet 0    magnesium oxide (MAG-OX) 400 MG tablet Take 1 tablet (400 mg) by mouth daily. 90 tablet 3    metFORMIN (GLUCOPHAGE) 500 MG tablet Take 1 tablet (500 mg) by mouth 2 times daily (with meals). 180 tablet 3    Omega-3 Fatty Acids (EQL FISH OIL) 1000 MG CAPS Take 1  "capsule by mouth daily. 90 capsule 0    potassium chloride chen ER (KLOR-CON M20) 20 MEQ CR tablet Take 2 tablets (40 mEq) by mouth daily.      senna-docusate (SENEXON-S) 8.6-50 MG tablet Take 1 to 2 tablets by mouth 2 times daily as needed for constipation 180 tablet 0    spironolactone (ALDACTONE) 50 MG tablet Take 1 tablet (50 mg) by mouth daily.      torsemide (DEMADEX) 20 MG tablet Take 5 tablets (100 mg) by mouth daily.      urea (CARMOL) 10 % external lotion Apply topically 2 times daily as needed for dry skin.      vitamin C (ASCORBIC ACID) 1000 MG TABS Take 1,000 mg by mouth daily      Vitamin D, Cholecalciferol, 25 MCG (1000 UT) CAPS Take 1 capsule by mouth daily.      warfarin ANTICOAGULANT (COUMADIN) 3 MG tablet TAKE THREE TABLETS BY MOUTH AT BEDTIME 90 tablet 0     No current facility-administered medications for this visit.       ROS:  10 point ROS of systems including Constitutional, Eyes, Respiratory, Cardiovascular, Gastroenterology, Genitourinary, Integumentary, Musculoskeletal, Psychiatric were all negative except for pertinent positives noted in my HPI.    Vitals:  /78   Pulse 79   Temp 97.5  F (36.4  C)   Resp 18   Ht 1.778 m (5' 10\")   Wt (!) 147.4 kg (325 lb)   SpO2 96%   BMI 46.63 kg/m    Exam:  GENERAL APPEARANCE:  Alert, in no distress, oriented, cooperative. Sitting comfortably in chair.   ENT:  Mouth and posterior oropharynx normal, moist mucous membranes  EYES:  EOM, conjunctivae, lids, pupils and irises normal  NECK:  No adenopathy,masses or thyromegaly  RESP:  respiratory effort and palpation of chest normal, lungs clear to auscultation , no respiratory distress  CV:  Palpation and auscultation of heart done , regular rate and rhythm, no murmur, rub, or gallop, +2 edema to LE, compressions on  GI/ABDOMEN:  normal bowel sounds, soft, nontender, no hepatosplenomegaly or other masses  M/S:   moves extremities spontaneously. Ambulation not tested   SKIN:  no rashes to exposed " skin.  NEURO:   intact   PSYCH:  oriented X 3, normal insight, judgement and memory, affect and mood normal,       Lab/Diagnostic data:  Recent labs in [facility] reviewed by me today.     Last Comprehensive Metabolic Panel:  Lab Results   Component Value Date     (L) 06/15/2025    POTASSIUM 3.9 06/15/2025    CHLORIDE 95 (L) 06/15/2025    CO2 27 06/15/2025    ANIONGAP 9 06/15/2025     (H) 06/15/2025    BUN 33.0 (H) 06/15/2025    CR 1.05 06/15/2025    GFRESTIMATED 74 06/15/2025    CHERI 8.7 (L) 06/15/2025       Lab Results   Component Value Date    WBC 5.8 06/15/2025    WBC 11.1 05/06/2021     Lab Results   Component Value Date    RBC 4.21 06/15/2025    RBC 5.45 05/06/2021     Lab Results   Component Value Date    HGB 10.9 06/15/2025    HGB 16.3 05/06/2021     Lab Results   Component Value Date    HCT 34.9 06/15/2025    HCT 49.4 05/06/2021     Lab Results   Component Value Date    MCV 83 06/15/2025    MCV 91 05/06/2021     Lab Results   Component Value Date    MCH 25.9 06/15/2025    MCH 29.5 05/06/2021     Lab Results   Component Value Date    MCHC 31.2 06/15/2025    MCHC 32.6 05/06/2021     Lab Results   Component Value Date    RDW 13.8 06/15/2025    RDW 13.6 05/06/2021     Lab Results   Component Value Date     06/15/2025     05/06/2021       ASSESSMENT/PLAN:    Generalized weakness  Recurrent Falls  Peripheral Neuropathy  Presented with two weeks of increased weakness and two recent falls in late May/early June, causing him to be essentially bed-bound. No acute fractures found on admission.   Suspect falls are likely from combination of deconditioning and muscle weakness, BLE edema (currently well managed), and diabetic neuropathy.   PT/OT, SW for discharge planning to safe environment      Chronic AF on Warfarin  asymptomatic A-fib with RVR, requiring restart of rate control with Metoprolol. EKG with rate controlled AF.   Per review of recent cardiology note, his cardiologist wants to avoid  beta blockers and the plan was to start diltiazem if rate control needed for his Afib.   - Diltiazem 30 mg QID; may need ongoing titration  - Cards follow up appt scheduled for 7/9  - Labs stable as above  INR today 1.8  Currently on Coumadin 9mg daily at HS. Goal INR 2-3  Recheck INR 6/19     Unsafe living environment  Notes he lives alone, per hospital notes floor covered with food, urine.   SW for discharge planning       Anemia  Hgb trending down slowly over past couple days from 12-13 --> 10.9. No noted bleeding source, no hematochezia or hematemesis, and no noted increased swelling or signs of increased fluid retention on exam.   Continue to monitor      MICHELLE, prerenal, resolved  Hyponatremia, resolved  Hypokalemia, resolved   Cr on admit 1.42, baseline appears to be around 1.0.   Sodium 126 on admission with baseline ~135. Here, has fluctuated between hyponatremia and hypernatremia throughout admission, which is consistent with his history. Hypokalemia and hyponatremia likely 2/2 torsemide and chlorthalidone use.  - F/U Cardiology outpatient   - Continue PTA torsemide 100 mg daily  - HOLDING PTA chlorthalidone (BP and edema well controlled without this med)              - if increased swelling or increased BP, consider restarting  - KCl 40 mEq supplement daily (changed from BID)  Recheck labs stable as above, Na 131      Constipation  Has Senna PRN, will add Miralax PRN      HFpEF without current exacerbation  Chronic AF on Warfarin  HTN  HLD  EF 55-60%.   Follow with HF clinic  BLE edema currently better, continue regular compression stocking use and skin cares.   - HOLD the chlorthalidone 25 mg daily-may need to restart.   -Continue diltiazem 30 mg four times daily, can increase as needed  -Continue K  -- PTA Mg oxide 400mg daily  -- PTA Torsemide 100mg  -- PTA Spironolactone 50mg daily   -- PTA ASA 81 daily  -- PTA Atorvastatin 40mg daily      T2DM  Peripheral Neuropathy  A1C on 3/26/25 6.2, stable.    Metformin 500mg BID.   Has Ozempic but has not started yet.   He follows with podiatry and was seen on 5/21/25 with no active ulcerations and sensations diminished bilaterally.    -- PTA Metformin   -- Follow up podiatry  -Blood sugars stable in TCU, continue Metformin      Hypothyroidism  TSH on admit 0.48. WNL in 10/2024.  -- Continue PTA Synthroid 175mcg daily      MDD  Follows with therapist outpatient. No chronic medications.   Has follow up with Dr. Buenrostro at [facility], appt 6/19.           Electronically signed by:  Kerri Robins CNP       Total time greater than 55 minutes reviewing chart in EPIC and PCC, medications, labs, vitals. Discussing with social work, physical therapy, occupational therapy and nursing.

## 2025-06-16 NOTE — PLAN OF CARE
Physical Therapy Discharge Summary    Reason for therapy discharge:    Discharged to transitional care facility.    Progress towards therapy goal(s). See goals on Care Plan in [facility] electronic health record for goal details.  Goals not met.  Barriers to achieving goals:   discharge from facility.    Therapy recommendation(s):    Continued therapy is recommended.  Rationale/Recommendations:  Continue ambulation towards Mod Ind.

## 2025-06-17 ENCOUNTER — DOCUMENTATION ONLY (OUTPATIENT)
Dept: ANTICOAGULATION | Facility: CLINIC | Age: 75
End: 2025-06-17
Payer: COMMERCIAL

## 2025-06-17 DIAGNOSIS — I48.20 CHRONIC ATRIAL FIBRILLATION (H): Primary | ICD-10-CM

## 2025-06-17 DIAGNOSIS — I48.19 PERSISTENT ATRIAL FIBRILLATION (H): ICD-10-CM

## 2025-06-17 DIAGNOSIS — Z79.01 LONG TERM (CURRENT) USE OF ANTICOAGULANTS: ICD-10-CM

## 2025-06-17 DIAGNOSIS — I48.0 PAROXYSMAL ATRIAL FIBRILLATION (H): ICD-10-CM

## 2025-06-17 LAB
ATRIAL RATE - MUSE: 153 BPM
ATRIAL RATE - MUSE: NORMAL BPM
DIASTOLIC BLOOD PRESSURE - MUSE: NORMAL MMHG
DIASTOLIC BLOOD PRESSURE - MUSE: NORMAL MMHG
GAMMA INTERFERON BACKGROUND BLD IA-ACNC: 0.06 IU/ML
INTERPRETATION ECG - MUSE: NORMAL
INTERPRETATION ECG - MUSE: NORMAL
M TB IFN-G BLD-IMP: POSITIVE
M TB IFN-G CD4+ BCKGRND COR BLD-ACNC: 9.94 IU/ML
MITOGEN IGNF BCKGRD COR BLD-ACNC: 0.02 IU/ML
MITOGEN IGNF BCKGRD COR BLD-ACNC: 0.5 IU/ML
P AXIS - MUSE: NORMAL DEGREES
P AXIS - MUSE: NORMAL DEGREES
PR INTERVAL - MUSE: NORMAL MS
PR INTERVAL - MUSE: NORMAL MS
QRS DURATION - MUSE: 104 MS
QRS DURATION - MUSE: 98 MS
QT - MUSE: 332 MS
QT - MUSE: 372 MS
QTC - MUSE: 450 MS
QTC - MUSE: 514 MS
QUANTIFERON MITOGEN: 10 IU/ML
QUANTIFERON NIL TUBE: 0.06 IU/ML
QUANTIFERON TB1 TUBE: 0.56 IU/ML
QUANTIFERON TB2 TUBE: 0.08
R AXIS - MUSE: 37 DEGREES
R AXIS - MUSE: 55 DEGREES
SYSTOLIC BLOOD PRESSURE - MUSE: NORMAL MMHG
SYSTOLIC BLOOD PRESSURE - MUSE: NORMAL MMHG
T AXIS - MUSE: 50 DEGREES
T AXIS - MUSE: 95 DEGREES
VENTRICULAR RATE- MUSE: 144 BPM
VENTRICULAR RATE- MUSE: 88 BPM

## 2025-06-17 NOTE — PROGRESS NOTES
ANTICOAGULATION  MANAGEMENT: Discharge Review    Clarke Moreira chart reviewed for anticoagulation continuity of care    Hospital Admission on 6/5-6/15/25 for weakness.    Discharge disposition: TCU    Results:    Recent labs: (last 7 days)     06/11/25  0644 06/12/25  0707 06/14/25  0832 06/15/25  0819   INR 2.29* 2.22* 1.89* 1.87*     Anticoagulation inpatient management:     home regimen continued    Anticoagulation discharge instructions:     Warfarin dosing: home regimen continued   Bridging: No   INR goal change: No      Medication changes affecting anticoagulation: No    Additional factors affecting anticoagulation: No     PLAN     No adjustment to anticoagulation plan needed    [facility] will resume monitoring upon discharge from TCU    Anticoagulation Calendar updated    Robinson Lara RN  6/17/2025  Anticoagulation Clinic  [facility] for routing messages: [email or web address]  [facility] patient phone line: [phone]

## 2025-06-18 ENCOUNTER — CARE COORDINATION (OUTPATIENT)
Dept: CARDIOLOGY | Facility: CLINIC | Age: 75
End: 2025-06-18
Payer: COMMERCIAL

## 2025-06-18 ENCOUNTER — RESULTS FOLLOW-UP (OUTPATIENT)
Dept: GERIATRICS | Facility: CLINIC | Age: 75
End: 2025-06-18
Payer: COMMERCIAL

## 2025-06-18 VITALS
DIASTOLIC BLOOD PRESSURE: 82 MMHG | HEART RATE: 80 BPM | TEMPERATURE: 98 F | HEIGHT: 70 IN | SYSTOLIC BLOOD PRESSURE: 152 MMHG | RESPIRATION RATE: 20 BRPM | WEIGHT: 315 LBS | BODY MASS INDEX: 45.1 KG/M2 | OXYGEN SATURATION: 96 %

## 2025-06-18 NOTE — PROGRESS NOTES
[facility] GERIATRICS    PRIMARY CARE PROVIDER AND CLINIC:  Henrry Shepard MD, [address] [place] [address] / [facility] [id number]  Chief Complaint   Patient presents with    Hospital F/U      [facility] Medical Record Number:  [id number]  Place of Service where encounter took place:  [facility] (TCU)    Clarke Moreira  is a 75 year old  (1950), admitted to the above facility from  [facility]. Hospital stay 6/5/25 through 6/15/25..     Patient has a past medical history of chronic atrial fibrillation, chronic lower extremity edema, peripheral neuropathy, obesity, diabetes mellitus, chronic kidney disease, frequent falls with frequent hospitalizations and subsequent TCU admissions.  He is well-known to this provider from recent hospitalizations followed by TCU stays.    He was hospitalized for the evaluation of increased weakness following 2 he was essentially bedbound.  Falls were felt most likely secondary to deconditioning and generalized weakness.  He was noted to have mild MICHELLE, mild hyponatremia both felt to be secondary to decreased intake and concurrent use of torsemide and chlorthalidone.  Patient did have slight downtrend of hemoglobin from 12-13 down to 10.9 during hospitalization without evidence of blood loss.  Patient complained of left-sided abdominal pain on admission which is felt most likely to be secondary to constipation.  He did have multiple bowel movements during hospitalization.      He has chronic A-fib, did have episodes of RVR during hospitalization, for which PTA metoprolol was initially held then restarted,  then transitioned to diltiazem per outpatient cardiologist recommendation.  He was discharged on PTA torsemide.  Chlorthalidone was held on discharge.    Discharge medications ultimately were as follows  Torsemide 100 mg daily  Potassium 40 mEq daily  Spironolactone 50 mg daily  Aspirin 81 mg daily  Atorvastatin  40 mg daily  Chronic warfarin    Patient was continued on PTA metformin for diabetes.  Ozempic has been prescribedThe longitudinal plan of care for the diagnosis(es)/condition(s) as documented were addressed during this visit. Due to the added complexity in care, I will continue to support Angel in the subsequent management and with ongoing continuity of care.  Has been waiting to take it until he sees his primary provider in the near future      Patient states he continues to feel weaker than baseline but is gradually getting stronger.  He believes he was doing well at home until recent falls and plans to return to his home situation.  He denies pain, cough, chest pain, shortness of breath.  He states he is standing but not walking with therapy.    CODE STATUS/ADVANCE DIRECTIVES DISCUSSION:  No CPR- Do NOT Intubate  DNR / DNI  ALLERGIES:   Allergies   Allergen Reactions    Seasonal Allergies       PAST MEDICAL HISTORY:   Past Medical History:   Diagnosis Date    Atrial fibrillation (H)     Basal cell carcinoma     Chronic anticoagulation     Diastolic heart failure (H)     Dyslipidemia     Essential hypertension     Morbid obesity (H)     Sleep-disordered breathing     Type 2 diabetes mellitus (H)       PAST SURGICAL HISTORY:   has a past surgical history that includes biopsy of skin lesion; Mohs micrographic procedure; and picc insertion (Right, 09/24/2017).  FAMILY HISTORY: family history includes Depression in his mother; Glaucoma in his maternal grandfather.  SOCIAL HISTORY:   reports that he has never smoked. He has never used smokeless tobacco. He reports that he does not drink alcohol and does not use drugs.  Patient's living condition: lives alone    Current medications reviewed by me today    Current Outpatient Medications   Medication Sig Dispense Refill    aspirin (ASA) 81 MG chewable tablet Take 1 tablet (81 mg) by mouth daily. 200 tablet 2    atorvastatin (LIPITOR) 40 MG tablet Take 1 tablet (40 mg)  by mouth daily 90 tablet 3    CERTAVITE/ANTIOXIDANTS tablet TAKE ONE TABLET BY MOUTH ONE TIME DAILY 100 tablet 0    [Paused] chlorthalidone (HYGROTON) 25 MG tablet Take 1 tablet (25 mg) by mouth daily. 90 tablet 11    diltiazem (CARDIZEM) 30 MG tablet Take 1 tablet (30 mg) by mouth 4 times daily.      FEROSUL 325 (65 Fe) MG tablet Take 1 tablet (325 mg) by mouth every other day. For iron deficiency anemia 60 tablet 0    hypromellose (ARTIFICIAL TEARS) 0.5 % SOLN ophthalmic solution Place 1 drop into both eyes 4 times daily as needed for dry eyes.      levothyroxine (SYNTHROID/LEVOTHROID) 175 MCG tablet Take 1 tablet (175 mcg) by mouth every morning (before breakfast) 90 tablet 0    magnesium oxide (MAG-OX) 400 MG tablet Take 1 tablet (400 mg) by mouth daily. 90 tablet 3    metFORMIN (GLUCOPHAGE) 500 MG tablet Take 1 tablet (500 mg) by mouth 2 times daily (with meals). 180 tablet 3    Omega-3 Fatty Acids (EQL FISH OIL) 1000 MG CAPS Take 1 capsule by mouth daily. 90 capsule 0    potassium chloride chen ER (KLOR-CON M20) 20 MEQ CR tablet Take 2 tablets (40 mEq) by mouth daily.      senna-docusate (SENEXON-S) 8.6-50 MG tablet Take 1 to 2 tablets by mouth 2 times daily as needed for constipation 180 tablet 0    spironolactone (ALDACTONE) 50 MG tablet Take 1 tablet (50 mg) by mouth daily.      torsemide (DEMADEX) 20 MG tablet Take 5 tablets (100 mg) by mouth daily.      urea (CARMOL) 10 % external lotion Apply topically 2 times daily as needed for dry skin.      vitamin C (ASCORBIC ACID) 1000 MG TABS Take 1,000 mg by mouth daily      Vitamin D, Cholecalciferol, 25 MCG (1000 UT) CAPS Take 1 capsule by mouth daily.      warfarin ANTICOAGULANT (COUMADIN) 3 MG tablet TAKE THREE TABLETS BY MOUTH AT BEDTIME 90 tablet 0     No current facility-administered medications for this visit.       ROS:  10 point ROS of systems including Constitutional, Eyes, Respiratory, Cardiovascular, Gastroenterology, Genitourinary, Integumentary,  "Musculoskeletal, Psychiatric were all negative except for pertinent positives noted in my HPI.    Vitals:  BP (!) 152/82   Pulse 80   Temp 98  F (36.7  C)   Resp 20   Ht 1.778 m (5' 10\")   Wt (!) 146.8 kg (323 lb 11.2 oz)   SpO2 96%   BMI 46.45 kg/m    Exam:  Pleasant, obese male, sitting comfortably in chair in his room  HEENT: Mucosa moist  Lungs clear  CV irregular, heart rate 70s  Abdomen soft, obese  Extremities: Trace ankle edema bilaterally.  Compressive stockings in place.  Sensation in the legs was not assessed.  There is no focal lower extremity weakness.    Lab/Diagnostic data:  Most Recent 3 CBC's:  Recent Labs   Lab Test 06/15/25  0819 06/14/25  0832 06/12/25  0707   WBC 5.8 8.9 6.2   HGB 10.9* 12.4* 12.7*   MCV 83 84 83    331 321     Most Recent 3 BMP's:  Recent Labs   Lab Test 06/15/25  0819 06/14/25  0832 06/13/25  1739 06/13/25  1026   * 132*  --  133*   POTASSIUM 3.9 4.1 4.8 5.4*   CHLORIDE 95* 91*  --  93*   CO2 27 24  --  29   BUN 33.0* 36.9*  --  36.7*   CR 1.05 1.14  --  1.14   ANIONGAP 9 17*  --  11   CHERI 8.7* 9.2  --  9.5   * 168*  --  118*     Most Recent 2 LFT's:  Recent Labs   Lab Test 06/05/25  1322 06/05/25  1135 03/26/25  0847   AST 45  --  20   ALT 32  --  11   ALKPHOS  --  104 105   BILITOTAL  --  0.6 0.7     Most Recent TSH and T4:  Recent Labs   Lab Test 06/05/25  1604 03/12/19  0625 12/11/18  1125   TSH 0.48   < > 11.20*   T4  --   --  1.21    < > = values in this interval not displayed.     Most Recent Hemoglobin A1c:  Recent Labs   Lab Test 06/05/25  1135   A1C 6.4*     Most Recent Anemia Panel:  Recent Labs   Lab Test 06/15/25  0819 06/06/25  0551 06/05/25 2037 06/05/25  1739 09/23/24  0638 09/22/24 2147 09/22/24 2049   WBC 5.8   < >  --   --    < >  --   --    HGB 10.9*   < >  --   --    < >  --   --    HCT 34.9*   < >  --   --    < >  --   --    MCV 83   < >  --   --    < >  --   --       < >  --   --    < >  --   --    IRON  --   --   --  "  --   --   --  14*   IRONSAT  --   --   --   --   --   --  3*   FEB  --   --   --   --   --   --  428   EDE  --   --   --   --   --  28*  --    B12  --   --   --  392  --   --   --    FOLIC  --   --  29.0  --   --   --   --     < > = values in this interval not displayed.       ASSESSMENT/PLAN:      Weakness, falls patient with multiple medical issues including obesity, deconditioning, heart failure, atrial fibrillation, chronic lower extremity edema, peripheral neuropathy, diabetes mellitus.  Patient presented with weakness following falls as well as MICHELLE.  MICHELLE likely secondary to poor intake in the setting of ongoing diuretic therapy.  Circumstances of admission to hospital and TCU similar to previous episodes over the last year  Patient maintains he is able to live independently.  Plan: Continue therapies.  Monitor vital signs, exam.  Assure safe discharge to the extent possible    Chronic heart failure with preserved ejection fraction  Chronic A-fib  Chronic lower extremity edema, likely multifactorial  AKA and chronic kidney disease at time of hospitalization as noted above  Plan: Continue current diuretic regimen as outlined in discharge summary  Diltiazem for heart rate control per request of cardiology  Monitor exam, BMP.  Chronic warfarin.    Diabetes mellitus type 2  Peripheral neuropathy  Hemoglobin A1c was 6.2 in March  Plan: Continue metformin.  Patient plans to start Ozempic at some point after discharge.  With this, he may not require metformin  Will need close outpatient monitoring.      Luis Nova MD

## 2025-06-18 NOTE — PROGRESS NOTES
[facility]: Cardiology Nurse Care Coordination   Post Discharge Phone Call Note    Clarke Moreira's most recent inpatient dates and diagnoses:   Admission Date: 6/5/2025   Admission Diagnosis: Hyponatremia - E87.1  Generalized weakness - R53.1  Falls frequently - R29.6  Anticoagulated - Z79.01  Acute kidney injury - N17.9   Discharge Date: 6/15/2025   Discharge Diagnosis: Generalized weakness - R53.1  Falls frequently - R29.6  Anticoagulated - Z79.01  Acute kidney injury - N17.9  Hyponatremia - E87.1  Chronic heart failure with preserved ejection fraction (H) - I50.32  Hypertensive heart disease with heart failure (H) - I11.0  Other specified counseling - Z71.89  Persistent atrial fibrillation (H) - I48.19  Longstanding persistent atrial fibrillation (H) - I48.11     Per hospital discharge summary and inpatient provider notes:   Follow-ups Needed After Discharge  PCP: ensure close cardiology follow up (has appt 7/9) - if issues with increased swelling, may need to restart chlorthalidone daily and then use torsemide as daily PRN.     For TCU:  Bilateral Leg Skin Cares: Q1-2 days and PRN  Clean legs gently, then apply moisturizers/emollients followed by compression stocking.   Minimize time without compression stocking in place to no more than 1 hour per day.      Discharge Disposition  Discharged to rehabilitation facility  Condition at discharge: Stable     Hospital Course  Clarke Moreira is a 75 year old male admitted on 6/5/2025. He has PMH of AF on warfarin, hypothyroidism, T2DM, MDD, HTN, HLD, HFpEF who presented due to 2 weeks of worsening weakness and two recent falls.      #Generalized weakness  #Recurrent Falls  #Peripheral Neuropathy  Has multiple previous admissions for similar situations (in 10/2024 and 12/2024). After those admissions, he went to TCU and with PT/OT, had improvement in strength. Unfortunately, he wasn't been able to follow up outpatient therapies since his home environment  felt unsafe and made it hard for him to get up or leave the house. Presented with two weeks of increased weakness and two recent falls in late May/early June, causing him to be essentially bed-bound. No acute fractures found on admission. Suspect falls are likely from combination of deconditioning and muscle weakness, BLE edema (currently well managed), and diabetic neuropathy. PT/OT evaluated Clarke and recommended TCU. He was showing progress with PT exercises prior to discharging to the TCU.     #Chronic AF on Warfarin  #Hyperkalemia, resolved  On 6/13 and 6/14/25 patient developed asymptomatic A-fib with RVR, requiring restart of rate control with Metoprolol. EKG with rate controlled AF. Per review of recent cardiology note, his cardiologist wants to avoid beta blockers and the plan was to start diltiazem if rate control needed for his Afib. Stopped metoprolol and transition to diltiazem for rate control.  Had transient increase in K+ to 5.4 on 6/13 and his PTA Kcl supplement was decreased to just once daily with resolution of hyperkalemia.  - Diltiazem 30 mg QID; may need ongoing titration  - Needs cardiology follow up (has appt scheduled for 7/9)  - KCl 40 mEq once daily     #Unsafe living environment  #Discharge planning  As above, home environment was not meeting nutritional nor PT/OT therapeutic needs after past TCU stay. Reports that his floor is covered in dirt and urine. Has had success with prior therapies at TCU but then returns home and is unable to maintain progress or his desired level of function. Anticipate will need to work closely and creatively with care coordination for finding a safe option for discharge. Patient would like to go to TCU and afterward receive home care w/ assistance with ADLs.     #Anemia  Hgb trending down slowly over past couple days from 12-13 --> 10.9. No noted bleeding source, no hematochezia or hematemesis, and no noted increased swelling or signs of increased fluid  retention on exam. Continue to monitor - would recommend CBC recheck at TCU in the next several days with consideration of iron studies and closely monitoring fluid status.     #MICHELLE, prerenal, resolved  #Hyponatremia, resolved  #Hypokalemia, resolved   Cr on admit 1.42, baseline appears to be around 1.0. FeUrea indicative of prerenal cause of MICHELLE. Sodium 126 on admission with baseline ~135. Here, has fluctuated between hyponatremia and hypernatremia throughout admission, which is consistent with his history. Hypokalemia and hyponatremia likely 2/2 torsemide and chlorthalidone use.  - F/U Cardiology outpatient   - Continue PTA torsemide 100 mg daily  - HOLDING PTA chlorthalidone (BP and edema well controlled without this med)              - if increased swelling or increased BP, consider restarting  - KCl 40 mEq supplement daily (changed from BID)    #Moderate malnutrition in the context of acute illness   Reported only having two true meals in the week prior to admission, otherwise only small amounts of PO intake. Lost 19% of body weight in the last month. Previously got meals with [employer] [facility]. Considered high risk for refeeding syndrome due to extreme weight loss, so electrolytes were replaced while he resumed a diet. Electrolytes stable for many days prior to discharge.     #Abdominal pain, left lateral, resolved  #Constipation, resolved  Presented with 1.5 weeks abdominal pain, which patient suspected was 2/2 constipation. CT A/P on admit showed cholelithiasis without cholecystitis, non-obstructive nephrolithiasis and moderate-large colonic stool burden without signs of obstruction. Since admission has had multiple bowel movements with improvement to his abdominal pain. Continue Miralax and senna BID PRN.     #HFpEF without current exacerbation  #Chronic AF on Warfarin  #HTN  #HLD  #Chronic BLE Edema, stable  Most recent Echo from 2024 with EF 55-60%. He was seen in HFpEF clinic on 5/7/25, classified as NYHA  III, reviewed GDMT where b/l LE edema and weakness was discussed. Metoprolol succinate  mg daily was discontinued which was thought to cause edema and weakness symptoms, and he was started on Chlorthalidone. Semaglutide, SGLT2i, and Diltiazem were possible options going forward (patient previously declined starting SGLT2i in 3/2024). On admit, troponin mildly elevated at 33 -> 32,  (improved from 1044 on 5/7/25). BLE edema currently better than it had been, and patient attributes to medication changes by cardiology, regular compression stocking use and skin cares.      For swelling and diuretic plan:     - Continue torsemide 100 mg daily  - HOLD the chlorthalidone 25 mg daily  - If noticing increased swelling, may need to consider restarting the chlorthalidone AND ALSO use torsemide 80 mg daily PRN  - Dr. Reddy (Clarke's cardiologist) had following plan on 5/7/25: chlorthalidone 25 mg daily and torsemide 80 mg daily PRN for swelling, weight gain, SOB              - However, did not resume this exact plan because Clarke wanted some consistency in his medication regimen, and we were changing his Afib meds     For Afib: diltiazem 30 mg four times daily, can increase as needed     -- Follow up with Cardiology outpatient   -- HOLDING PTA Chlorthalidone 25mg daily  -- Potassium 40meq daily  -- PTA Mg oxide 400mg daily  -- PTA Torsemide 100mg  -- PTA Spironolactone 50mg daily   -- PTA ASA 81 daily  -- PTA Atorvastatin 40mg daily  -- PTA Compression stockings   -- Patient Care Order: Q1-2 day skin cleaning, emollient application and reapply compression stockings.      #T2DM  #Peripheral Neuropathy  A1C on 3/26/25 6.2, stable. Home regimen is Metformin 500mg BID. Was prescribed Ozempic but has yet to start it, wanting to wait until follow up appt with PCP in June. He follows with podiatry and was seen on 5/21/25 with no active ulcerations and sensations diminished bilaterally.    -- PTA Metformin   -- Follow  up podiatry outpatient      #Hypothyroidism  TSH on admit 0.48. WNL in 10/2024.  -- Continue PTA Synthroid 175mcg daily      #MDD  Follows with therapist outpatient. No chronic medications. Plans to follow up with Dr. Buenrostro at [facility], next appt 6/19.         Consultations This Hospital Stay  PHARMACY TO DOSE WARFARIN  PHYSICAL THERAPY ADULT IP CONSULT  OCCUPATIONAL THERAPY ADULT IP CONSULT  CARE MANAGEMENT / SOCIAL WORK IP CONSULT  CARE MANAGEMENT / SOCIAL WORK IP CONSULT  NUTRITION SERVICES ADULT IP CONSULT  PHYSICAL THERAPY ADULT IP CONSULT  OCCUPATIONAL THERAPY ADULT IP CONSULT     Code Status  No CPR- Do NOT Intubate     The patient was discussed with Dr. Barragan.      Hari Dickson MD  [facility] MED SURG  [address] [place] [id number]  Phone: [phone]  Fax: [phone]  ______________________________________________________________________     Physical Exam  Vital Signs: Temp: 98.2  F (36.8  C) Temp src: Oral BP: 108/62 Pulse: 92   Resp: 16 SpO2: 97 % O2 Device: None (Room air)    Weight: 324 lbs 15.33 oz  General Appearance:  No acute distress, well appearing  HENT: NCAT, moist mucous membranes  Respiratory: Normal effort. Clear to ausculation bilaterally, no rales, rhonchi or wheezes     Cardiovascular: Irregular rate and rhythm with tachycardia, no murmurs  GI: soft, flat, NTND   Extr: trace edema in bilateral lower extremities, wrapped in compression stockings      Primary Care Physician  Henrry Shepard     Discharge Orders          Primary Care - Care Coordination Referral       General info for SNF     Length of Stay Estimate: Short Term Care: Estimated # of Days 31-90  Condition at Discharge: Improving  Level of care:skilled   Rehabilitation Potential: Fair  Admission H&P remains valid and up-to-date: Yes  Recent Chemotherapy: N/A  Use Nursing Home Standing Orders: Yes          Follow Up and recommended labs and tests     Follow up with  primary care provider at scheduled appointment on 6/16/25.  No follow up labs or test are needed.          Reason for your hospital stay     Generalized weakness and recent falls at home, acute kidney injury due to not eating/drinking enough that has resolved          Daily weights     Call Provider for weight gain of more than 2 pounds per day or 5 pounds per week.          Intake and output     Every shift      Activity - Up with assistive device          Patient care order     BILATERAL LEG CARES: Q1-2 days and PRN  Please clean legs gently, apply topical moisturizers/emollients and allow to air dry for 1 hour, then reapply compression stockings. Minimize time without compression stockings in place for no more than 1 hour.      No CPR- Do NOT Intubate          Physical Therapy Adult Consult     Evaluate and treat as clinically indicated.     Reason:  severe deconditioning and weakness of bilateral lower extremities          Occupational Therapy Adult Consult     Evaluate and treat as clinically indicated.     Reason:  ADLs      Fall precautions          Diet     Follow this diet upon discharge: Current Diet:Orders Placed This Encounter      Snacks/Supplements Adult: Ensure Max Protein (bariatric); With Meals      Combination Diet Regular Diet; Low Saturated Fat Na <2400mg Diet         Significant Results and Procedures  Most Recent 3 CBC's:        Recent Labs   Lab Test 06/15/25  0819 06/14/25  0832 06/12/25  0707   WBC 5.8 8.9 6.2   HGB 10.9* 12.4* 12.7*   MCV 83 84 83    331 321      Most Recent 3 BMP's:         Recent Labs   Lab Test 06/15/25  0819 06/14/25  0832 06/13/25  1739 06/13/25  1026   * 132*  --  133*   POTASSIUM 3.9 4.1 4.8 5.4*   CHLORIDE 95* 91*  --  93*   CO2 27 24  --  29   BUN 33.0* 36.9*  --  36.7*   CR 1.05 1.14  --  1.14   ANIONGAP 9 17*  --  11   CHERI 8.7* 9.2  --  9.5   * 168*  --  118*   ,       Results for orders placed or performed during the hospital encounter of  06/05/25   CT Abdomen Pelvis w Contrast     Narrative     EXAMINATION: CT ABDOMEN PELVIS W CONTRAST, 6/5/2025 2:37 PM     INDICATION: Left lower quadrant pain, generalized weakness, fall 4  days ago with bilateral hip pain, further evaluate     COMPARISON STUDY: 9/22/2024     TECHNIQUE: CT scan of the abdomen and pelvis was performed on  multidetector CT scanner using volumetric acquisition technique and  images were reconstructed in multiple planes with variable thickness  and reviewed on dedicated workstations.      CONTRAST: 135cc of isovue 370 injected IV without oral contrast     CT scan radiation dose is optimized to minimum requisite dose using  automated dose modulation techniques.     FINDINGS:     Lower thorax: Normal.     Liver: No mass. No intrahepatic biliary ductal dilation.     Biliary System: Cholelithiasis without cholecystitis.     Pancreas: No mass or pancreatic ductal dilation.     Adrenal glands: No mass or nodules     Spleen: Normal.     Kidneys: No suspicious mass, obstructing calculus or hydronephrosis.   Nonobstructing stone in the lower pole of the left kidney measuring 3  mm with second stone measuring 2 mm in the lower pole more  posteriorly. 2 mm right lower pole nonobstructing renal stone.     Gastrointestinal tract :Normal appendix. Normal caliber small bowel.     Mesentery/peritoneum/retroperitoneum: Small fat-containing umbilical  hernia. There No mass. No free fluid or air.     Lymph nodes: No significant lymphadenopathy.     Vasculature: Patent major abdominal vasculature.     Pelvis: Urinary bladder is normal.  1  Osseous structures: No aggressive or acute osseous lesion.      Soft tissues: Within normal limits.         Impression     IMPRESSION:   1. No acute abnormality within the abdomen or pelvis.  2. Cholelithiasis without cholecystitis.  3. Nonobstructing lower pole renal stones measure up to 3 mm.     I have personally reviewed the examination and initial  interpretation  and I agree with the findings.     AILYN RAMIREZ MD         SYSTEM ID:  [id number]   Chest XR,  PA & LAT     Narrative     EXAM: XR CHEST 2 VIEWS  6/5/2025 11:56 AM      HISTORY:  Generalized weakness        COMPARISON:  Chest radiograph 3/6/2019, CT chest 9/22/2024     FINDINGS:   Trachea is midline. Cardiac silhouette is within normal limits.  Calcifications of the aortic knob. No focal consolidative opacity. No  pleural effusion. No pneumothorax.     No acute osseous abnormality. Visualized soft tissues and upper  abdomen are unremarkable.           Impression     IMPRESSION:   No acute focal airspace disease.     I have personally reviewed the examination and initial interpretation  and I agree with the findings.     SOL CRAIG MD         SYSTEM ID:  [id number]   Head CT w/o contrast     Narrative     EXAM: CT HEAD W/O CONTRAST  6/5/2025 2:30 PM      HISTORY:  Headache, anticoagulated evaluate spontaneous bleed        COMPARISON:  CT 12/7/2024     TECHNIQUE: Using multidetector thin collimation helical acquisition  technique, axial, coronal and sagittal CT images from the skull base  to the vertex were obtained without intravenous contrast.   (topogram) image(s) also obtained and reviewed.     FINDINGS:  No acute intracranial hemorrhage, mass effect, or midline shift. No  acute loss of gray-white matter differentiation in the cerebral  hemispheres. 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 Ventricles are proportionate to the cerebral  sulci. Clear basal cisterns.     The bony calvaria and the bones of the skull base are normal. The  visualized portions of the paranasal sinuses and mastoid air cells are  clear. Grossly normal orbits.         Impression     IMPRESSION:   1. No acute intracranial pathology.  2. Unchanged chronic infarcts within the bilateral occipital lobes,  left greater than right      I have personally reviewed the examination and  initial interpretation  and I agree with the findings.     TARAN AMADO MD         SYSTEM ID:  [id number]   CT Lumbar Spine Reconstructed     Narrative     CT LUMBAR SPINE RECONSTRUCTED 6/5/2025 2:38 PM     History: Fall, pain, evaluate fracture.     Comparison: Lumbar spine CT 10/25/2024.     Technique: Reconstructed axial, coronal and sagittal CT images through  the lumbar spine were obtained from same day CT abdomen.      Findings:   Limited imaging. There are 5 lumbar type vertebrae. Regarding  alignment, mild levocurvature of the lumbar spine. There is moderate  to severe disc space narrowing at L4-5, otherwise preserved disc  spaces. Mild to moderate spondylosis of the lumbar spine with endplate  spurring, osteophytic bridging and facet arthropathy.      Please see same day CT abdomen for intra-abdominal and thoracic  findings.     Findings on a level by level basis are as follows:     L1-L2: No spinal canal or neuroforaminal stenosis.     L2-L3: No spinal canal or neuroforaminal stenosis.     L3-L4: Bilateral moderate neural foraminal stenosis. No spinal canal  stenosis.     L4-L5: Severe bilateral neural foraminal stenosis. No spinal canal  stenosis with     L5-S1: No spinal canal or neuroforaminal stenosis.        Impression     Impression:  1.  No acute fracture or traumatic dislocation of the lumbar spine.  2.  Multilevel lumbar degenerative changes, most pronounced at L3-4  and L4-5 with moderate and severe neural foraminal stenosis,  respectively. No high-grade spinal canal stenosis.      I have personally reviewed the examination and initial interpretation  and I agree with the findings.     TARAN AMADO MD         SYSTEM ID:  [id number]      *Note: Due to a large number of results and/or encounters for the requested time period, some results have not been displayed. A complete set of results can be found in Results Review.         Discharge Medications  Current Discharge Medication List                START taking these medications     Details   diltiazem (CARDIZEM) 30 MG tablet Take 1 tablet (30 mg) by mouth 4 times daily.     Associated Diagnoses: Longstanding persistent atrial fibrillation (H)                   CONTINUE these medications which have CHANGED     Details   potassium chloride chen ER (KLOR-CON M20) 20 MEQ CR tablet Take 2 tablets (40 mEq) by mouth daily.     Associated Diagnoses: Chronic heart failure with preserved ejection fraction (H)       torsemide (DEMADEX) 20 MG tablet Take 5 tablets (100 mg) by mouth daily.     Associated Diagnoses: Chronic heart failure with preserved ejection fraction (H); Hypertensive heart disease with heart failure (H)                   CONTINUE these medications which have NOT CHANGED     Details   aspirin (ASA) 81 MG chewable tablet Take 1 tablet (81 mg) by mouth daily.  Qty: 200 tablet, Refills: 2     Associated Diagnoses: Essential hypertension       atorvastatin (LIPITOR) 40 MG tablet Take 1 tablet (40 mg) by mouth daily  Qty: 90 tablet, Refills: 3     Associated Diagnoses: Type 2 diabetes mellitus without complication, without long-term current use of insulin (H)       CERTAVITE/ANTIOXIDANTS tablet TAKE ONE TABLET BY MOUTH ONE TIME DAILY  Qty: 100 tablet, Refills: 0     Associated Diagnoses: Type 2 diabetes mellitus without complication, without long-term current use of insulin (H)       chlorthalidone (HYGROTON) 25 MG tablet Take 1 tablet (25 mg) by mouth daily.  Qty: 90 tablet, Refills: 11     Associated Diagnoses: Chronic heart failure with preserved ejection fraction (H); Hypertensive heart disease with heart failure (H)       FEROSUL 325 (65 Fe) MG tablet Take 1 tablet (325 mg) by mouth every other day. For iron deficiency anemia  Qty: 60 tablet, Refills: 0     Associated Diagnoses: Anemia, unspecified type       levothyroxine (SYNTHROID/LEVOTHROID) 175 MCG tablet Take 1 tablet (175 mcg) by mouth every morning (before breakfast)  Qty: 90 tablet, Refills:  0     Associated Diagnoses: Hypothyroidism, unspecified type       magnesium oxide (MAG-OX) 400 MG tablet Take 1 tablet (400 mg) by mouth daily.  Qty: 90 tablet, Refills: 3     Associated Diagnoses: Nutritional deficiency       metFORMIN (GLUCOPHAGE) 500 MG tablet Take 1 tablet (500 mg) by mouth 2 times daily (with meals).  Qty: 180 tablet, Refills: 3     Associated Diagnoses: Type 2 diabetes mellitus with stage 3b chronic kidney disease, without long-term current use of insulin (H)       Omega-3 Fatty Acids (EQL FISH OIL) 1000 MG CAPS Take 1 capsule by mouth daily.  Qty: 90 capsule, Refills: 0     Associated Diagnoses: Hyperlipidemia LDL goal <100       senna-docusate (SENEXON-S) 8.6-50 MG tablet Take 1 to 2 tablets by mouth 2 times daily as needed for constipation  Qty: 180 tablet, Refills: 0     Associated Diagnoses: Constipation, unspecified constipation type       spironolactone (ALDACTONE) 50 MG tablet Take 1 tablet (50 mg) by mouth daily.     Associated Diagnoses: Chronic heart failure with preserved ejection fraction (H)       urea (CARMOL) 10 % external lotion Apply topically 2 times daily as needed for dry skin.     Associated Diagnoses: Adequate nutrition       vitamin C (ASCORBIC ACID) 1000 MG TABS Take 1,000 mg by mouth daily       Vitamin D, Cholecalciferol, 25 MCG (1000 UT) CAPS Take 1 capsule by mouth daily.       warfarin ANTICOAGULANT (COUMADIN) 3 MG tablet TAKE THREE TABLETS BY MOUTH AT BEDTIME  Qty: 90 tablet, Refills: 0     Associated Diagnoses: Chronic atrial fibrillation (H); Long term (current) use of anticoagulants; Paroxysmal atrial fibrillation (H); Persistent atrial fibrillation (H)       hypromellose (ARTIFICIAL TEARS) 0.5 % SOLN ophthalmic solution Place 1 drop into both eyes 4 times daily as needed for dry eyes.              Allergies  Allergies        Allergies   Allergen Reactions    Seasonal Allergies               Cosigned by: Leslie Barragan DO at 6/15/2025 10:44 AM                                           Post Discharge Assessment:  Called patient, no answer and unable to leave voice message. Will try again later. Patient is currently at a TCU and has follow up with cardiology on 7/9/25.    Medication Review:  I have reviewed all Clarke's medications per the discharge summary with particular attention to any medication changes, including discontinued and new medications.     Follow-Up Plan:     Future Appointments   Date Time Provider Department Center   6/19/2025  7:00 AM Luis Nova MD [facility]   6/19/2025  8:20 AM Lety Buenrostro PsyD [facility]   7/9/2025  8:00 AM [facility]   7/9/2025  8:30 AM Eric Landin MD [facility]   7/30/2025  8:00 AM Jesús Lagos, KIERRA [facility]      I reviewed the above appointments with Clarke and am assisting in scheduling additional appointments needed per the discharge summary above.     Subspecialty Assessments:   (educate - this is where your own dot phrases can go regarding postangio, etc etc)      I reviewed the Cardiology clinic's phone number ([phone] Option #1 and after hours on-call number [phone] #4 and ask for the On-Call Cardiologist) and have advised Clarke to contact us with changes or worsening of symptoms, additional questions about medications and appt scheduling needs.   Clarke verbalizes understanding and agrees with plan of care.

## 2025-06-19 ENCOUNTER — TELEPHONE (OUTPATIENT)
Dept: GERIATRICS | Facility: CLINIC | Age: 75
End: 2025-06-19

## 2025-06-19 ENCOUNTER — VIRTUAL VISIT (OUTPATIENT)
Dept: PSYCHOLOGY | Facility: CLINIC | Age: 75
End: 2025-06-19
Payer: COMMERCIAL

## 2025-06-19 ENCOUNTER — TRANSITIONAL CARE UNIT VISIT (OUTPATIENT)
Dept: GERIATRICS | Facility: CLINIC | Age: 75
End: 2025-06-19
Payer: COMMERCIAL

## 2025-06-19 DIAGNOSIS — I48.91 ATRIAL FIBRILLATION, UNSPECIFIED TYPE (H): ICD-10-CM

## 2025-06-19 DIAGNOSIS — E11.42 TYPE 2 DIABETES MELLITUS WITH DIABETIC POLYNEUROPATHY, WITHOUT LONG-TERM CURRENT USE OF INSULIN (H): ICD-10-CM

## 2025-06-19 DIAGNOSIS — F41.1 GAD (GENERALIZED ANXIETY DISORDER): ICD-10-CM

## 2025-06-19 DIAGNOSIS — R53.81 PHYSICAL DECONDITIONING: Primary | ICD-10-CM

## 2025-06-19 DIAGNOSIS — F34.1 PERSISTENT DEPRESSIVE DISORDER: Primary | ICD-10-CM

## 2025-06-19 DIAGNOSIS — R53.1 GENERALIZED WEAKNESS: ICD-10-CM

## 2025-06-19 DIAGNOSIS — F33.1 MODERATE EPISODE OF RECURRENT MAJOR DEPRESSIVE DISORDER (H): ICD-10-CM

## 2025-06-19 DIAGNOSIS — I50.32 CHRONIC HEART FAILURE WITH PRESERVED EJECTION FRACTION (H): ICD-10-CM

## 2025-06-19 NOTE — TELEPHONE ENCOUNTER
"Provider: REAGAN Rehman CNP  Facility: [facility]   Facility Type:  TCU    Caller: Vanessa  Call Back Number: [phone]  Reason for call: INR  Diagnosis/Goal: A. Fib  Heparin/Lovenox:  No  Currently on ABX?: No  Other interacting medication:  None  Missed or refused doses: No    Date INR Previous Dose/Orders   6/14 1.89 11mg x1   6/15 1.87 9mg x1   6/16 1.8 9mg daily   6/19 2.1      Telephone encounter sent to: Luis Nova MD    Requesting orders for Warfarin dosing and date of next INR draw  Please respond to \"Geriatrics Nurse Pool\".    Tash Cruz RN   "

## 2025-06-19 NOTE — PROGRESS NOTES
Telemedicine Visit: The patient's condition can be safely assessed and treated via an audio only encounter.      Reason for Telemedicine Visit: Patient unable to travel    Originating Site (Patient Location): Patient's other in TCU at [facility]     Distant Location (provider location):  On-site    Consent:  The patient/guardian has verbally consented to: the potential risks and benefits of telemedicine (video visit) versus in person care; bill my insurance or make self-payment for services provided; and responsibility for payment of non-covered services.     Mode of Communication:  Telephone call via [employer]    As the provider I attest to compliance with applicable laws and regulations related to telemedicine.    [facility]: : Integrated Behavioral Health    Behavioral Health Clinician Progress Note    Patient Name: Clarke Moreira          Service Type:  Individual      Service Location:   Phone call (patient / identified key support person reached)     Session Start Time: 8:16  Session End Time: 9:05      Session Length: 38 - 52      Attendees: Client     Service Modality:  Phone Visit    Visit Activities (Refresh list every visit): [facility] Only    Diagnostic Assessment Date: last one was 4/1/25, next due 4/1/26  Treatment Plan Review Date: last one was 4/1/25, next due 10/1/25  See Flowsheets for today's PHQ-9 and CHRIS-7 results  Previous PHQ-9:       3/15/2024     9:48 AM 7/11/2024     9:37 AM 3/26/2025     8:23 AM   PHQ-9 SCORE   PHQ-9 Total Score MyChart 19 (Moderately severe depression) 0 0   PHQ-9 Total Score 19 0 0        Proxy-reported     Previous CHRIS-7:       12/11/2018    10:31 AM 5/10/2019     8:26 AM 9/3/2019    10:35 AM   CHRIS-7 SCORE   Total Score 20 13 19     Treatment Objective(s) Addressed in This Session:  Goal 1: Clarke will learn/use coping skills to continue to reduce intensity/frequency of suicidal thoughts     Goal 2: Clarke will challenge/reframe  negative harsh thoughts that he has about himself.     Current Stressors / Issues:  During the session today, Clarke focused on his recent experiences within the medical system and the emotional impact these have had on him. He expressed feelings of helplessness and vulnerability, especially in moments when he already felt uncertain about his health. While his depressive symptoms remain present, he reported they are less intense, attributing the improvement to his ongoing recovery and the support he has received. He displayed gratitude for the care he received, particularly highlighting the team at [facility], describing them as  kind  and stating that  they treated me like a human.  He shared that this team encouraged him to ask questions and helped him feel more informed than he has ever felt in a healthcare setting.    However, Clarke also described two distressing interactions in which he felt disregarded and treated gruffly, leading him to feel unsafe and emotionally dismissed. These incidents have contributed to a sense of internal conflict: he values the help he receives but also fears the consequences of speaking up, especially while he remains dependent on others for care. Clarke was able to process the emotions tied to these experiences, including frustration, sadness, and a fear of being perceived as  difficult  or ungrateful.    The session included use of problem-solving therapy, in which Clarke worked to identify possible responses and next steps if he chooses to address these negative experiences. He brainstormed respectful and assertive ways to advocate for himself, such as asking clarifying questions in the moment or sharing his concerns with a trusted staff member at the transitional care facility. While still ambivalent about taking action, he appreciated having more clarity around his options and expressed relief at being able to talk through the events without judgment.    Therapeutic  Interventions:  Motivational Interviewing (MI): Validated patient's thoughts, feelings and experience. Expressed respect for patient's autonomy in decision making.  Expressed and demonstrated empathy through reflective listening.  Affirmed patient's strengths and abilities.  DBT: Discusssed interpersonal effectiveness skills    Response to treatment interventions:   Patient was receptive to interventions utilized.  Patient was engaged in the therapy process.   Patient gained new insights into behaviors.      Progress on Treatment Objective(s) / Homework:  Stable     Medication Review:  No current psychiatric medications prescribed    Medication Compliance:  NA    Changes in Health Issues:   Yes: currently in a TCU     Chemical Use Review:   Substance Use: No active concerns, has been sober for many decades     Tobacco Use: No current tobacco use.      Assessment: Current Emotional / Mental Status (status of significant symptoms):  Risk status (Self / Other harm or suicidal ideation)    Patient denies current fears or concerns for personal safety.  Patient denies current or recent suicidal ideation or behaviors.  Patient denies current or recent homicidal ideation or behaviors.  Patient denies current or recent self injurious behavior or ideation.  Patient denies other safety concerns.      Appearance:   N/a telephone visit   Eye Contact:   N/a telephone visit   Psychomotor Behavior: N/a telephone visit   Attitude:   Cooperative  Interested Pleasant  Orientation:   All  Speech   Rate / Production: Normal/ Responsive   Volume:  Normal   Mood:    Normal  Affect:    Appropriate   Thought Content:  Clear   Thought Form:  Coherent  Logical   Insight:    Good     Diagnoses:  Persistent Depressive Disorder  Major Depression, recurrent, moderate  Generalized Anxiety Disorder    Recommendations & Plan:     Next appt on 6/30  Will need to check if he is at home or in TCU to know where to call for next visit  Monitor if  Clarke's home services get set up as intended by [facility]

## 2025-06-19 NOTE — LETTER
6/19/2025      Clarke Moreira  [address]  [facility] [id number]        [facility] GERIATRICS    PRIMARY CARE PROVIDER AND CLINIC:  Henrry Shepard MD, [address] / [facility] [id number]  Chief Complaint   Patient presents with     Hospital F/U      [place] Medical Record Number:  [id number]  Place of Service where encounter took place:  [facility] (TCU)    Clarke Moreira  is a 75 year old  (1950), admitted to the above facility from  [facility]. Hospital stay 6/5/25 through 6/15/25..     Patient has a past medical history of chronic atrial fibrillation, chronic lower extremity edema, peripheral neuropathy, obesity, diabetes mellitus, chronic kidney disease, frequent falls with frequent hospitalizations and subsequent TCU admissions.  He is well-known to this provider from recent hospitalizations followed by TCU stays.    He was hospitalized for the evaluation of increased weakness following 2 he was essentially bedbound.  Falls were felt most likely secondary to deconditioning and generalized weakness.  He was noted to have mild MICHELLE, mild hyponatremia both felt to be secondary to decreased intake and concurrent use of torsemide and chlorthalidone.  Patient did have slight downtrend of hemoglobin from 12-13 down to 10.9 during hospitalization without evidence of blood loss.  Patient complained of left-sided abdominal pain on admission which is felt most likely to be secondary to constipation.  He did have multiple bowel movements during hospitalization.      He has chronic A-fib, did have episodes of RVR during hospitalization, for which PTA metoprolol was initially held then restarted,  then transitioned to diltiazem per outpatient cardiologist recommendation.  He was discharged on PTA torsemide.  Chlorthalidone was held on discharge.    Discharge medications ultimately were as follows  Torsemide 100 mg  RFV: TBI, VT progress check    HPI: Progress check after second round of vision therapy.   Headaches: stable  Blurred vision: fluctuates  Double vision: no? Not sure  Photophobia: stable  Dizziness/imbalance: stable    Homework: lifesaver cards (able to complete, sometimes easier than other), infinity walk (able to do, but difficult), rainbow/peripheral ball toss.    He saw [facility]a at [facility] on [facility]/24: Propranolol 80 mg daily, Topamax 100 mg daily  Amitriptyline 10 mg at night, Nurtec 75 mg daily as needed, planning to start Botox for headaches.    Previous (8/15/2023): Gabriel sustained a concussion on 1/24/2023.  At the time of the injury he was at work.  A coworker was cutting a branch that fell and hit him on the head.  He had LOC briefly and his memory of the accident is poor.  He was taken to the ER and was found to have two subdural hematomas.   (+) headaches: pain all over, no particular trigger  (+) blurred vision: in the right eye, distance and reading  (-) double vision:   (+) photophobia: worse with sunlight,   (+) dizziness/imbalance: occurs randomly, sometimes during walking    A/P  Post concussive syndrome  (primary encounter diagnosis)  Disorder of vergence  Convergence excess  Hyperopia of both eyes with astigmatism    During testing today, he constantly squints his eyes to see clearly.  I believe that this is causing an accommodative spasm and driving convergence.  There is a significant hyperopic shift in prescription today.  Recommended updating glasses.  Continue with vision therapy at home; clear (divergence) lifesaver card, infinity walk, peripheral/rainbow ball toss.     Work restrictions: deferring to [facility]'s recommendations.     Next Visit: Return for progress check 2-4 weeks after getting new glasses .    I have reviewed and verified the chief complaint, history of present illness, review of systems, medication and past/family/social history. See the relevant sections of  daily  Potassium 40 mEq daily  Spironolactone 50 mg daily  Aspirin 81 mg daily  Atorvastatin 40 mg daily  Chronic warfarin    Patient was continued on PTA metformin for diabetes.  Ozempic has been prescribedThe longitudinal plan of care for the diagnosis(es)/condition(s) as documented were addressed during this visit. Due to the added complexity in care, I will continue to support Angel in the subsequent management and with ongoing continuity of care.  Has been waiting to take it until he sees his primary provider in the near future      Patient states he continues to feel weaker than baseline but is gradually getting stronger.  He believes he was doing well at home until recent falls and plans to return to his home situation.  He denies pain, cough, chest pain, shortness of breath.  He states he is standing but not walking with therapy.    CODE STATUS/ADVANCE DIRECTIVES DISCUSSION:  No CPR- Do NOT Intubate  DNR / DNI  ALLERGIES:   Allergies   Allergen Reactions     Seasonal Allergies       PAST MEDICAL HISTORY:   Past Medical History:   Diagnosis Date     Atrial fibrillation (H)      Basal cell carcinoma      Chronic anticoagulation      Diastolic heart failure (H)      Dyslipidemia      Essential hypertension      Morbid obesity (H)      Sleep-disordered breathing      Type 2 diabetes mellitus (H)       PAST SURGICAL HISTORY:   has a past surgical history that includes biopsy of skin lesion; Mohs micrographic procedure; and picc insertion (Right, 09/24/2017).  FAMILY HISTORY: family history includes Depression in his mother; Glaucoma in his maternal grandfather.  SOCIAL HISTORY:   reports that he has never smoked. He has never used smokeless tobacco. He reports that he does not drink alcohol and does not use drugs.  Patient's living condition: lives alone    Current medications reviewed by me today    Current Outpatient Medications   Medication Sig Dispense Refill     aspirin (ASA) 81 MG chewable tablet Take 1  the EHR for eye exam/clinical data.    I spent a total of 24 minutes on the day of the visit.  This includes chart review, documenting, and counseling the patient.    May Sanchez, OD   tablet (81 mg) by mouth daily. 200 tablet 2     atorvastatin (LIPITOR) 40 MG tablet Take 1 tablet (40 mg) by mouth daily 90 tablet 3     CERTAVITE/ANTIOXIDANTS tablet TAKE ONE TABLET BY MOUTH ONE TIME DAILY 100 tablet 0     [Paused] chlorthalidone (HYGROTON) 25 MG tablet Take 1 tablet (25 mg) by mouth daily. 90 tablet 11     diltiazem (CARDIZEM) 30 MG tablet Take 1 tablet (30 mg) by mouth 4 times daily.       FEROSUL 325 (65 Fe) MG tablet Take 1 tablet (325 mg) by mouth every other day. For iron deficiency anemia 60 tablet 0     hypromellose (ARTIFICIAL TEARS) 0.5 % SOLN ophthalmic solution Place 1 drop into both eyes 4 times daily as needed for dry eyes.       levothyroxine (SYNTHROID/LEVOTHROID) 175 MCG tablet Take 1 tablet (175 mcg) by mouth every morning (before breakfast) 90 tablet 0     magnesium oxide (MAG-OX) 400 MG tablet Take 1 tablet (400 mg) by mouth daily. 90 tablet 3     metFORMIN (GLUCOPHAGE) 500 MG tablet Take 1 tablet (500 mg) by mouth 2 times daily (with meals). 180 tablet 3     Omega-3 Fatty Acids (EQL FISH OIL) 1000 MG CAPS Take 1 capsule by mouth daily. 90 capsule 0     potassium chloride chen ER (KLOR-CON M20) 20 MEQ CR tablet Take 2 tablets (40 mEq) by mouth daily.       senna-docusate (SENEXON-S) 8.6-50 MG tablet Take 1 to 2 tablets by mouth 2 times daily as needed for constipation 180 tablet 0     spironolactone (ALDACTONE) 50 MG tablet Take 1 tablet (50 mg) by mouth daily.       torsemide (DEMADEX) 20 MG tablet Take 5 tablets (100 mg) by mouth daily.       urea (CARMOL) 10 % external lotion Apply topically 2 times daily as needed for dry skin.       vitamin C (ASCORBIC ACID) 1000 MG TABS Take 1,000 mg by mouth daily       Vitamin D, Cholecalciferol, 25 MCG (1000 UT) CAPS Take 1 capsule by mouth daily.       warfarin ANTICOAGULANT (COUMADIN) 3 MG tablet TAKE THREE TABLETS BY MOUTH AT BEDTIME 90 tablet 0     No current facility-administered medications for this visit.       ROS:  10 point ROS  "of systems including Constitutional, Eyes, Respiratory, Cardiovascular, Gastroenterology, Genitourinary, Integumentary, Musculoskeletal, Psychiatric were all negative except for pertinent positives noted in my HPI.    Vitals:  BP (!) 152/82   Pulse 80   Temp 98  F (36.7  C)   Resp 20   Ht 1.778 m (5' 10\")   Wt (!) 146.8 kg (323 lb 11.2 oz)   SpO2 96%   BMI 46.45 kg/m    Exam:  Pleasant, obese male, sitting comfortably in chair in his room  HEENT: Mucosa moist  Lungs clear  CV irregular, heart rate 70s  Abdomen soft, obese  Extremities: Trace ankle edema bilaterally.  Compressive stockings in place.  Sensation in the legs was not assessed.  There is no focal lower extremity weakness.    Lab/Diagnostic data:  Most Recent 3 CBC's:  Recent Labs   Lab Test 06/15/25  0819 06/14/25  0832 06/12/25  0707   WBC 5.8 8.9 6.2   HGB 10.9* 12.4* 12.7*   MCV 83 84 83    331 321     Most Recent 3 BMP's:  Recent Labs   Lab Test 06/15/25  0819 06/14/25  0832 06/13/25  1739 06/13/25  1026   * 132*  --  133*   POTASSIUM 3.9 4.1 4.8 5.4*   CHLORIDE 95* 91*  --  93*   CO2 27 24  --  29   BUN 33.0* 36.9*  --  36.7*   CR 1.05 1.14  --  1.14   ANIONGAP 9 17*  --  11   CHERI 8.7* 9.2  --  9.5   * 168*  --  118*     Most Recent 2 LFT's:  Recent Labs   Lab Test 06/05/25  1322 06/05/25  1135 03/26/25  0847   AST 45  --  20   ALT 32  --  11   ALKPHOS  --  104 105   BILITOTAL  --  0.6 0.7     Most Recent TSH and T4:  Recent Labs   Lab Test 06/05/25  1604 03/12/19  0625 12/11/18  1125   TSH 0.48   < > 11.20*   T4  --   --  1.21    < > = values in this interval not displayed.     Most Recent Hemoglobin A1c:  Recent Labs   Lab Test 06/05/25  1135   A1C 6.4*     Most Recent Anemia Panel:  Recent Labs   Lab Test 06/15/25  0819 06/06/25  0551 06/05/25 2037 06/05/25  1739 09/23/24  0638 09/22/24 2147 09/22/24 2049   WBC 5.8   < >  --   --    < >  --   --    HGB 10.9*   < >  --   --    < >  --   --    HCT 34.9*   < >  --   --   "  < >  --   --    MCV 83   < >  --   --    < >  --   --       < >  --   --    < >  --   --    IRON  --   --   --   --   --   --  14*   IRONSAT  --   --   --   --   --   --  3*   FEB  --   --   --   --   --   --  428   EDE  --   --   --   --   --  28*  --    B12  --   --   --  392  --   --   --    FOLIC  --   --  29.0  --   --   --   --     < > = values in this interval not displayed.       ASSESSMENT/PLAN:      Weakness, falls patient with multiple medical issues including obesity, deconditioning, heart failure, atrial fibrillation, chronic lower extremity edema, peripheral neuropathy, diabetes mellitus.  Patient presented with weakness following falls as well as MICHELLE.  MICHELLE likely secondary to poor intake in the setting of ongoing diuretic therapy.  Circumstances of admission to hospital and TCU similar to previous episodes over the last year  Patient maintains he is able to live independently.  Plan: Continue therapies.  Monitor vital signs, exam.  Assure safe discharge to the extent possible    Chronic heart failure with preserved ejection fraction  Chronic A-fib  Chronic lower extremity edema, likely multifactorial  AKA and chronic kidney disease at time of hospitalization as noted above  Plan: Continue current diuretic regimen as outlined in discharge summary  Diltiazem for heart rate control per request of cardiology  Monitor exam, BMP.  Chronic warfarin.    Diabetes mellitus type 2  Peripheral neuropathy  Hemoglobin A1c was 6.2 in March  Plan: Continue metformin.  Patient plans to start Ozempic at some point after discharge.  With this, he may not require metformin  Will need close outpatient monitoring.      Luis Nova MD        Sincerely,        Luis Nova MD    Electronically signed

## 2025-06-25 ENCOUNTER — LAB REQUISITION (OUTPATIENT)
Dept: LAB | Facility: CLINIC | Age: 75
End: 2025-06-25
Payer: COMMERCIAL

## 2025-06-25 DIAGNOSIS — I48.20 CHRONIC ATRIAL FIBRILLATION, UNSPECIFIED (H): ICD-10-CM

## 2025-06-26 ENCOUNTER — RESULTS FOLLOW-UP (OUTPATIENT)
Dept: GERIATRICS | Facility: CLINIC | Age: 75
End: 2025-06-26

## 2025-06-26 ENCOUNTER — TRANSITIONAL CARE UNIT VISIT (OUTPATIENT)
Dept: GERIATRICS | Facility: CLINIC | Age: 75
End: 2025-06-26
Payer: COMMERCIAL

## 2025-06-26 ENCOUNTER — ANTICOAGULATION THERAPY VISIT (OUTPATIENT)
Dept: ANTICOAGULATION | Facility: CLINIC | Age: 75
End: 2025-06-26

## 2025-06-26 VITALS
TEMPERATURE: 98 F | BODY MASS INDEX: 45.1 KG/M2 | OXYGEN SATURATION: 97 % | SYSTOLIC BLOOD PRESSURE: 135 MMHG | HEIGHT: 70 IN | DIASTOLIC BLOOD PRESSURE: 62 MMHG | WEIGHT: 315 LBS | RESPIRATION RATE: 18 BRPM | HEART RATE: 84 BPM

## 2025-06-26 DIAGNOSIS — E03.9 HYPOTHYROIDISM, UNSPECIFIED TYPE: ICD-10-CM

## 2025-06-26 DIAGNOSIS — I50.22 CHRONIC SYSTOLIC CONGESTIVE HEART FAILURE (H): ICD-10-CM

## 2025-06-26 DIAGNOSIS — R29.6 FREQUENT FALLS: Primary | ICD-10-CM

## 2025-06-26 DIAGNOSIS — G47.33 OSA (OBSTRUCTIVE SLEEP APNEA): ICD-10-CM

## 2025-06-26 DIAGNOSIS — E78.5 HYPERLIPIDEMIA LDL GOAL <100: ICD-10-CM

## 2025-06-26 DIAGNOSIS — E11.22 TYPE 2 DIABETES MELLITUS WITH STAGE 3B CHRONIC KIDNEY DISEASE, WITHOUT LONG-TERM CURRENT USE OF INSULIN (H): ICD-10-CM

## 2025-06-26 DIAGNOSIS — I48.0 PAROXYSMAL ATRIAL FIBRILLATION (H): ICD-10-CM

## 2025-06-26 DIAGNOSIS — F33.1 MODERATE EPISODE OF RECURRENT MAJOR DEPRESSIVE DISORDER (H): ICD-10-CM

## 2025-06-26 DIAGNOSIS — I10 ESSENTIAL HYPERTENSION WITH GOAL BLOOD PRESSURE LESS THAN 140/90: ICD-10-CM

## 2025-06-26 DIAGNOSIS — I50.32 CHRONIC HEART FAILURE WITH PRESERVED EJECTION FRACTION (H): ICD-10-CM

## 2025-06-26 DIAGNOSIS — R53.1 GENERALIZED WEAKNESS: ICD-10-CM

## 2025-06-26 DIAGNOSIS — N18.32 TYPE 2 DIABETES MELLITUS WITH STAGE 3B CHRONIC KIDNEY DISEASE, WITHOUT LONG-TERM CURRENT USE OF INSULIN (H): ICD-10-CM

## 2025-06-26 DIAGNOSIS — I73.9 PAD (PERIPHERAL ARTERY DISEASE): ICD-10-CM

## 2025-06-26 DIAGNOSIS — G63 POLYNEUROPATHY ASSOCIATED WITH UNDERLYING DISEASE: ICD-10-CM

## 2025-06-26 DIAGNOSIS — Z79.01 LONG TERM (CURRENT) USE OF ANTICOAGULANTS: Primary | ICD-10-CM

## 2025-06-26 PROBLEM — R65.20 SEVERE SEPSIS (H): Status: RESOLVED | Noted: 2024-09-22 | Resolved: 2025-06-26

## 2025-06-26 PROBLEM — I48.19 PERSISTENT ATRIAL FIBRILLATION (H): Status: RESOLVED | Noted: 2025-02-10 | Resolved: 2025-06-26

## 2025-06-26 PROBLEM — A41.9 SEVERE SEPSIS (H): Status: RESOLVED | Noted: 2024-09-22 | Resolved: 2025-06-26
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
## 2025-06-26 PROBLEM — L03.90 CELLULITIS: Status: RESOLVED | Noted: 2018-01-05 | Resolved: 2025-06-26

## 2025-06-26 PROBLEM — I48.20 CHRONIC ATRIAL FIBRILLATION (H): Status: RESOLVED | Noted: 2021-03-31 | Resolved: 2025-06-26
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
## 2025-06-26 PROBLEM — N17.9 AKI (ACUTE KIDNEY INJURY): Status: RESOLVED | Noted: 2024-10-25 | Resolved: 2025-06-26

## 2025-06-26 LAB
INR PPP: 2.41 (ref 0.85–1.15)
PROTHROMBIN TIME: 25.4 SECONDS (ref 11.8–14.8)

## 2025-06-26 PROCEDURE — 36415 COLL VENOUS BLD VENIPUNCTURE: CPT | Mod: ORL | Performed by: NURSE PRACTITIONER

## 2025-06-26 PROCEDURE — 85610 PROTHROMBIN TIME: CPT | Mod: ORL | Performed by: NURSE PRACTITIONER

## 2025-06-26 NOTE — LETTER
" 6/26/2025      Clarke Moreira  [address]  [facility] [id number]        [facility] GERIATRICS    Chief Complaint   Patient presents with     RECHECK     HPI:  Clarke Moreira is a 75 year old  (1950), who is being seen today for an episodic care visit at: [facility] ([facility]) [[id number]].  He has medical history significant for frequent falls, chronic heart failure with preserved ejection fraction, essential hypertension, PAD, paroxysmal atrial fibrillation, THONG, hyperlipidemia, hypothyroidism, type 2 diabetes with stage IIIb chronic kidney disease moderate to episode of recurrent major depressive disorder polyneuropathy generalized weakness. He was recently hospitalized following a fall and worsening weakness.    Today he reports doing well physically, has been progressing with therapy.  He stated he is having more strength in his legs.  He also reports that pain is improved on current pain medications.  He denies shortness of breath, nausea, dizziness.  He is eating and drinking well, has regular bowel movements.    Allergies, and PMH/PSH reviewed in [facility] today.  REVIEW OF SYSTEMS:  10 point ROS of systems including Constitutional, Eyes, Respiratory, Cardiovascular, Gastroenterology, Genitourinary, Integumentary, Musculoskeletal, Psychiatric were all negative except for pertinent positives noted in my HPI.    Objective:   /62   Pulse 84   Temp 98  F (36.7  C)   Resp 18   Ht 1.778 m (5' 10\")   Wt (!) 149.1 kg (328 lb 9.6 oz)   SpO2 97%   BMI 47.15 kg/m      Physical Exam:  GENERAL APPEARANCE:  Alert, in no distress, oriented, cooperative.   ENT:  Mouth and posterior oropharynx normal, moist mucous membranes  EYES:  EOM, conjunctivae, lids, pupils and irises normal  NECK:  No adenopathy,masses or thyromegaly  RESP:  respiratory effort and palpation of chest normal, lungs clear to auscultation , no respiratory distress  CV:  Palpation and auscultation of heart done, " regular rate and rhythm, no murmur, rub, or gallop, no edema to LE  GI/ABDOMEN:  normal bowel sounds, soft, nontender, no hepatosplenomegaly or other masses  M/S:   moves extremities spontaneously. Ambulation not tested   SKIN:  no rashes to exposed skin.   NEURO:   intact   PSYCH:  oriented X 3, normal insight, affect and mood normal     Recent labs in [facility] reviewed by me today.  and Most Recent 3 CBC's:  Recent Labs   Lab Test 06/15/25  0819 06/14/25  0832 06/12/25  0707   WBC 5.8 8.9 6.2   HGB 10.9* 12.4* 12.7*   MCV 83 84 83    331 321     Most Recent 3 BMP's:  Recent Labs   Lab Test 06/15/25  0819 06/14/25  0832 06/13/25 1739 06/13/25  1026   * 132*  --  133*   POTASSIUM 3.9 4.1 4.8 5.4*   CHLORIDE 95* 91*  --  93*   CO2 27 24  --  29   BUN 33.0* 36.9*  --  36.7*   CR 1.05 1.14  --  1.14   ANIONGAP 9 17*  --  11   CHERI 8.7* 9.2  --  9.5   * 168*  --  118*     Most Recent 2 LFT's:  Recent Labs   Lab Test 06/05/25  1322 06/05/25  1135 03/26/25  0847   AST 45  --  20   ALT 32  --  11   ALKPHOS  --  104 105   BILITOTAL  --  0.6 0.7     Most Recent 3 Creatinines:  Recent Labs   Lab Test 06/15/25  0819 06/14/25  0832 06/13/25  1026   CR 1.05 1.14 1.14     Most Recent 3 Hemoglobins:  Recent Labs   Lab Test 06/15/25  0819 06/14/25  0832 06/12/25  0707   HGB 10.9* 12.4* 12.7*     Most Recent Cholesterol Panel:  Recent Labs   Lab Test 03/26/25  0847   CHOL 105   LDL 50   HDL 37*   TRIG 88     Most Recent Anemia Panel:  Recent Labs   Lab Test 06/15/25  0819 06/06/25  0551 06/05/25 2037 06/05/25 1739 09/23/24  0638 09/22/24  2147 09/22/24 2049   WBC 5.8   < >  --   --    < >  --   --    HGB 10.9*   < >  --   --    < >  --   --    HCT 34.9*   < >  --   --    < >  --   --    MCV 83   < >  --   --    < >  --   --       < >  --   --    < >  --   --    IRON  --   --   --   --   --   --  14*   IRONSAT  --   --   --   --   --   --  3*   FEB  --   --   --   --   --   --  428   EDE  --   --   --   --    --  28*  --    B12  --   --   --  392  --   --   --    FOLIC  --   --  29.0  --   --   --   --     < > = values in this interval not displayed.       Assessment/Plan:     Paroxysmal atrial fibrillation   Rate controlled with Diltiazem 30 mg QID  On warfarin 10 mg daily, next INR check is on 7/7/25.   Follow up with Cardiology 7/9     Anemia  Hgb trended down from 12-13 to 10.9. No noted bleeding source, no hematochezia or hematemesis, and no noted increased swelling or signs of increased fluid retention at hospitalization.   Recheck CBC        Chronic heart failure with preserved ejection fraction   Essential hypertension   Hyperlipidemia  Chronic and stable. EF 55-60%.   Was previously on chlorthalidone   Continue diltiazem 30 mg four times daily   Continue torsemide 100 mg daily  Continue Spironolactone 50mg daily   Continue aspirin 81 daily  Continue atorvastatin 40mg daily   Follow up with HF clinic     Type 2 diabetes mellitus  Peripheral Neuropathy  Latest A1C 6.2, stable.   Continue metformin 500mg BID.   Follow up with podiatry     Hypothyroidism  TSH on admit 0.48.   Continue levothyroxine 175 mcg daily      Major depressive disorder  Not on any antidepressant   Continue to Follow up with [facility].      Generalized weakness  Recurrent Falls  Physical Deconditioning  He's progressing with PT/OT; strength and endurance is improving per resident.   Continue PT/OT    Orders  Lab - CBC recheck (Anemia)    Electronically signed by: REAGAN Ling CNP DNP        Sincerely,        REAGAN Ling CNP    Electronically signed

## 2025-06-26 NOTE — PROGRESS NOTES
ANTICOAGULATION MANAGEMENT     Clarke Moreira 75 year old male is on warfarin with therapeutic INR result. (Goal INR 2.0-3.0)    Recent labs: (last 7 days)     06/26/25  0600   INR 2.41*       ASSESSMENT     Source(s): Chart Reviewed     New illness, injury, or hospitalization: Yes:    Currently in TCU   Recent hospitalization from 6/5-15/25 for generalized weakness for 2 wks  Unsafe living environment and moderate malnutrition.  Previous result: Subtherapeutic last 2 INR reults; (1.87, 1.89)  Additional findings: None       PLAN     - Currently in TCU and managed by in-house physician.    Cary Mcknight, TONIEN, RN  Anticoagulation nurse  [phone]

## 2025-06-26 NOTE — PROGRESS NOTES
"[facility] GERIATRICS    Chief Complaint   Patient presents with    RECHECK     HPI:  Clarke Moreira is a 75 year old  (1950), who is being seen today for an episodic care visit at: [facility] ([facility]) [[id number]].  He has medical history significant for frequent falls, chronic heart failure with preserved ejection fraction, essential hypertension, PAD, paroxysmal atrial fibrillation, THONG, hyperlipidemia, hypothyroidism, type 2 diabetes with stage IIIb chronic kidney disease moderate to episode of recurrent major depressive disorder polyneuropathy generalized weakness. He was recently hospitalized following a fall and worsening weakness.    Today he reports doing well physically, has been progressing with therapy.  He stated he is having more strength in his legs.  He also reports that pain is improved on current pain medications.  He denies shortness of breath, nausea, dizziness.  He is eating and drinking well, has regular bowel movements.    Allergies, and PMH/PSH reviewed in EPIC today.  REVIEW OF SYSTEMS:  10 point ROS of systems including Constitutional, Eyes, Respiratory, Cardiovascular, Gastroenterology, Genitourinary, Integumentary, Musculoskeletal, Psychiatric were all negative except for pertinent positives noted in my HPI.    Objective:   /62   Pulse 84   Temp 98  F (36.7  C)   Resp 18   Ht 1.778 m (5' 10\")   Wt (!) 149.1 kg (328 lb 9.6 oz)   SpO2 97%   BMI 47.15 kg/m      Physical Exam:  GENERAL APPEARANCE:  Alert, in no distress, oriented, cooperative.   ENT:  Mouth and posterior oropharynx normal, moist mucous membranes  EYES:  EOM, conjunctivae, lids, pupils and irises normal  NECK:  No adenopathy,masses or thyromegaly  RESP:  respiratory effort and palpation of chest normal, lungs clear to auscultation , no respiratory distress  CV:  Palpation and auscultation of heart done, regular rate and rhythm, no murmur, rub, or gallop, no edema to LE  GI/ABDOMEN:  normal " bowel sounds, soft, nontender, no hepatosplenomegaly or other masses  M/S:   moves extremities spontaneously. Ambulation not tested   SKIN:  no rashes to exposed skin.   NEURO:   intact   PSYCH:  oriented X 3, normal insight, affect and mood normal     Recent labs in [facility] reviewed by me today.  and Most Recent 3 CBC's:  Recent Labs   Lab Test 06/15/25  0819 06/14/25  0832 06/12/25  0707   WBC 5.8 8.9 6.2   HGB 10.9* 12.4* 12.7*   MCV 83 84 83    331 321     Most Recent 3 BMP's:  Recent Labs   Lab Test 06/15/25  0819 06/14/25  0832 06/13/25  1739 06/13/25  1026   * 132*  --  133*   POTASSIUM 3.9 4.1 4.8 5.4*   CHLORIDE 95* 91*  --  93*   CO2 27 24  --  29   BUN 33.0* 36.9*  --  36.7*   CR 1.05 1.14  --  1.14   ANIONGAP 9 17*  --  11   CHERI 8.7* 9.2  --  9.5   * 168*  --  118*     Most Recent 2 LFT's:  Recent Labs   Lab Test 06/05/25  1322 06/05/25  1135 03/26/25  0847   AST 45  --  20   ALT 32  --  11   ALKPHOS  --  104 105   BILITOTAL  --  0.6 0.7     Most Recent 3 Creatinines:  Recent Labs   Lab Test 06/15/25  0819 06/14/25  0832 06/13/25  1026   CR 1.05 1.14 1.14     Most Recent 3 Hemoglobins:  Recent Labs   Lab Test 06/15/25  0819 06/14/25  0832 06/12/25  0707   HGB 10.9* 12.4* 12.7*     Most Recent Cholesterol Panel:  Recent Labs   Lab Test 03/26/25  0847   CHOL 105   LDL 50   HDL 37*   TRIG 88     Most Recent Anemia Panel:  Recent Labs   Lab Test 06/15/25  0819 06/06/25  0551 06/05/25 2037 06/05/25  1739 09/23/24  0638 09/22/24  2147 09/22/24  2049   WBC 5.8   < >  --   --    < >  --   --    HGB 10.9*   < >  --   --    < >  --   --    HCT 34.9*   < >  --   --    < >  --   --    MCV 83   < >  --   --    < >  --   --       < >  --   --    < >  --   --    IRON  --   --   --   --   --   --  14*   IRONSAT  --   --   --   --   --   --  3*   FEB  --   --   --   --   --   --  428   EDE  --   --   --   --   --  28*  --    B12  --   --   --  392  --   --   --    FOLIC  --   --  29.0  --   --    --   --     < > = values in this interval not displayed.       Assessment/Plan:     Paroxysmal atrial fibrillation   Rate controlled with Diltiazem 30 mg QID  On warfarin 10 mg daily, next INR check is on 7/7/25.   Follow up with Cardiology 7/9     Anemia  Hgb trended down from 12-13 to 10.9. No noted bleeding source, no hematochezia or hematemesis, and no noted increased swelling or signs of increased fluid retention at hospitalization.   Recheck CBC        Chronic heart failure with preserved ejection fraction   Essential hypertension   Hyperlipidemia  Chronic and stable. EF 55-60%.   Was previously on chlorthalidone   Continue diltiazem 30 mg four times daily   Continue torsemide 100 mg daily  Continue Spironolactone 50mg daily   Continue aspirin 81 daily  Continue atorvastatin 40mg daily   Follow up with HF clinic     Type 2 diabetes mellitus  Peripheral Neuropathy  Latest A1C 6.2, stable.   Continue metformin 500mg BID.   Follow up with podiatry     Hypothyroidism  TSH on admit 0.48.   Continue levothyroxine 175 mcg daily      Major depressive disorder  Not on any antidepressant   Continue to Follow up with [facility].      Generalized weakness  Recurrent Falls  Physical Deconditioning  He's progressing with PT/OT; strength and endurance is improving per resident.   Continue PT/OT    Orders  Lab - CBC recheck (Anemia)    Electronically signed by: REAGAN Ling CNP DNP

## 2025-06-30 ENCOUNTER — VIRTUAL VISIT (OUTPATIENT)
Dept: PSYCHOLOGY | Facility: CLINIC | Age: 75
End: 2025-06-30
Payer: COMMERCIAL

## 2025-06-30 VITALS
OXYGEN SATURATION: 98 % | DIASTOLIC BLOOD PRESSURE: 80 MMHG | BODY MASS INDEX: 45.1 KG/M2 | RESPIRATION RATE: 22 BRPM | SYSTOLIC BLOOD PRESSURE: 119 MMHG | HEART RATE: 85 BPM | HEIGHT: 70 IN | TEMPERATURE: 97.6 F | WEIGHT: 315 LBS

## 2025-06-30 DIAGNOSIS — F41.1 GAD (GENERALIZED ANXIETY DISORDER): ICD-10-CM

## 2025-06-30 DIAGNOSIS — F34.1 PERSISTENT DEPRESSIVE DISORDER: Primary | ICD-10-CM

## 2025-06-30 DIAGNOSIS — F33.1 MODERATE EPISODE OF RECURRENT MAJOR DEPRESSIVE DISORDER (H): ICD-10-CM

## 2025-07-01 ENCOUNTER — TRANSITIONAL CARE UNIT VISIT (OUTPATIENT)
Dept: GERIATRICS | Facility: CLINIC | Age: 75
End: 2025-07-01
Payer: COMMERCIAL

## 2025-07-01 DIAGNOSIS — G47.33 OSA (OBSTRUCTIVE SLEEP APNEA): ICD-10-CM

## 2025-07-01 DIAGNOSIS — I10 ESSENTIAL HYPERTENSION WITH GOAL BLOOD PRESSURE LESS THAN 140/90: ICD-10-CM

## 2025-07-01 DIAGNOSIS — E03.9 HYPOTHYROIDISM, UNSPECIFIED TYPE: ICD-10-CM

## 2025-07-01 DIAGNOSIS — R29.6 FREQUENT FALLS: Primary | ICD-10-CM

## 2025-07-01 DIAGNOSIS — N18.32 TYPE 2 DIABETES MELLITUS WITH STAGE 3B CHRONIC KIDNEY DISEASE, WITHOUT LONG-TERM CURRENT USE OF INSULIN (H): ICD-10-CM

## 2025-07-01 DIAGNOSIS — I73.9 PAD (PERIPHERAL ARTERY DISEASE): ICD-10-CM

## 2025-07-01 DIAGNOSIS — I48.0 PAROXYSMAL ATRIAL FIBRILLATION (H): ICD-10-CM

## 2025-07-01 DIAGNOSIS — E11.22 TYPE 2 DIABETES MELLITUS WITH STAGE 3B CHRONIC KIDNEY DISEASE, WITHOUT LONG-TERM CURRENT USE OF INSULIN (H): ICD-10-CM

## 2025-07-01 DIAGNOSIS — I50.32 CHRONIC HEART FAILURE WITH PRESERVED EJECTION FRACTION (H): ICD-10-CM

## 2025-07-01 PROCEDURE — 99309 SBSQ NF CARE MODERATE MDM 30: CPT | Performed by: NURSE PRACTITIONER

## 2025-07-01 NOTE — PROGRESS NOTES
[facility] GERIATRICS    PRIMARY CARE PROVIDER AND CLINIC:  Henrry Shepard MD, [address] [place] [address] / [facility] [id number]  Chief Complaint   Patient presents with    Follow Up      [place] Medical Record Number:  [id number]  Place of Service where encounter took place:  [facility] (TCU) [[id number]]    Clarke Moreira  is a 75 year old  (1950), admitted to the above facility from  [facility]. Hospital stay 6/5/2025 through 6/15/2025..     Patient seen today for follow up NP visit in TCU:  1. Frequent falls    2. Chronic heart failure with preserved ejection fraction (H)    3. Essential hypertension with goal blood pressure less than 140/90    4. PAD (peripheral artery disease)    5. THONG (obstructive sleep apnea)    6. Hypothyroidism, unspecified type    7. Type 2 diabetes mellitus with stage 3b chronic kidney disease, without long-term current use of insulin (H)    8. Paroxysmal atrial fibrillation (H)        HPI:    Doing well overall. Chlorthalidone stopped. He is on [place] and Torsemide.   Weights today 325.6 >329 pounds. Has Heart clinic follow up on 7/9  Denies HA, chest pain, palpitations, dizziness.  No troubles breathing, no SOB, no Concerns with urination, no constipation. Eating and drinking okay. Food that was ordered yesterday was not great. INR stable, denies bleeding.   Sleeping okay. Denies pain.   Lives at home in apartment alone, ambulates with walker.      CODE STATUS/ADVANCE DIRECTIVES DISCUSSION:  No CPR- Do NOT Intubate  DNR / DNI-Comfort focus   ALLERGIES:   Allergies   Allergen Reactions    Seasonal Allergies       PAST MEDICAL HISTORY:   Past Medical History:   Diagnosis Date    Atrial fibrillation (H)     Basal cell carcinoma     Chronic anticoagulation     Diastolic heart failure (H)     Dyslipidemia     Essential hypertension     Morbid obesity (H)     Sleep-disordered breathing     Type 2 diabetes mellitus (H)        PAST SURGICAL HISTORY:   has a past surgical history that includes biopsy of skin lesion; Mohs micrographic procedure; and picc insertion (Right, 09/24/2017).  FAMILY HISTORY: family history includes Depression in his mother; Glaucoma in his maternal grandfather.  SOCIAL HISTORY:   reports that he has never smoked. He has never used smokeless tobacco. He reports that he does not drink alcohol and does not use drugs.  Patient's living condition: lives alone    Post Discharge Medication Reconciliation Status:   MED REC REQUIRED  Post Medication Reconciliation Status: discharge medications reconciled, continue medications without change       Current Outpatient Medications   Medication Sig Dispense Refill    aspirin (ASA) 81 MG chewable tablet Take 1 tablet (81 mg) by mouth daily. 200 tablet 2    atorvastatin (LIPITOR) 40 MG tablet Take 1 tablet (40 mg) by mouth daily 90 tablet 3    CERTAVITE/ANTIOXIDANTS tablet TAKE ONE TABLET BY MOUTH ONE TIME DAILY 100 tablet 0    [Paused] chlorthalidone (HYGROTON) 25 MG tablet Take 1 tablet (25 mg) by mouth daily. 90 tablet 11    diltiazem (CARDIZEM) 30 MG tablet Take 1 tablet (30 mg) by mouth 4 times daily.      FEROSUL 325 (65 Fe) MG tablet Take 1 tablet (325 mg) by mouth every other day. For iron deficiency anemia 60 tablet 0    hypromellose (ARTIFICIAL TEARS) 0.5 % SOLN ophthalmic solution Place 1 drop into both eyes 4 times daily as needed for dry eyes.      levothyroxine (SYNTHROID/LEVOTHROID) 175 MCG tablet Take 1 tablet (175 mcg) by mouth every morning (before breakfast) 90 tablet 0    magnesium oxide (MAG-OX) 400 MG tablet Take 1 tablet (400 mg) by mouth daily. 90 tablet 3    metFORMIN (GLUCOPHAGE) 500 MG tablet Take 1 tablet (500 mg) by mouth 2 times daily (with meals). 180 tablet 3    Omega-3 Fatty Acids (EQL FISH OIL) 1000 MG CAPS Take 1 capsule by mouth daily. 90 capsule 0    potassium chloride chen ER (KLOR-CON M20) 20 MEQ CR tablet Take 2 tablets (40 mEq) by  "mouth daily.      senna-docusate (SENEXON-S) 8.6-50 MG tablet Take 1 to 2 tablets by mouth 2 times daily as needed for constipation 180 tablet 0    spironolactone (ALDACTONE) 50 MG tablet Take 1 tablet (50 mg) by mouth daily.      torsemide (DEMADEX) 20 MG tablet Take 5 tablets (100 mg) by mouth daily.      urea (CARMOL) 10 % external lotion Apply topically 2 times daily as needed for dry skin.      vitamin C (ASCORBIC ACID) 1000 MG TABS Take 1,000 mg by mouth daily      Vitamin D, Cholecalciferol, 25 MCG (1000 UT) CAPS Take 1 capsule by mouth daily.      warfarin ANTICOAGULANT (COUMADIN) 3 MG tablet TAKE THREE TABLETS BY MOUTH AT BEDTIME 90 tablet 0     No current facility-administered medications for this visit.       ROS:  10 point ROS of systems including Constitutional, Eyes, Respiratory, Cardiovascular, Gastroenterology, Genitourinary, Integumentary, Musculoskeletal, Psychiatric were all negative except for pertinent positives noted in my HPI.    Vitals:  /80   Pulse 85   Temp 97.6  F (36.4  C)   Resp 22   Ht 1.778 m (5' 10\")   Wt (!) 149.2 kg (329 lb)   SpO2 98%   BMI 47.21 kg/m    Exam:  GENERAL APPEARANCE:  Alert, in no distress, oriented, cooperative. Sitting comfortably in chair.   ENT:  Mouth and posterior oropharynx normal, moist mucous membranes  EYES:  EOM, conjunctivae, lids, pupils and irises normal  NECK:  No adenopathy,masses or thyromegaly  RESP:  respiratory effort and palpation of chest normal, lungs clear to auscultation , no respiratory distress  CV:  Palpation and auscultation of heart done , regular rate and rhythm, no murmur, rub, or gallop, +2 edema to LE, compressions on  GI/ABDOMEN:  normal bowel sounds, soft, nontender, no hepatosplenomegaly or other masses  M/S:   moves extremities spontaneously. Ambulation not tested   SKIN:  no rashes to exposed skin.  NEURO:   intact   PSYCH:  oriented X 3, normal insight, judgement and memory, affect and mood normal, "       Lab/Diagnostic data:  Recent labs in [facility] reviewed by me today.     Last Comprehensive Metabolic Panel:  Lab Results   Component Value Date     (L) 06/15/2025    POTASSIUM 3.9 06/15/2025    CHLORIDE 95 (L) 06/15/2025    CO2 27 06/15/2025    ANIONGAP 9 06/15/2025     (H) 06/15/2025    BUN 33.0 (H) 06/15/2025    CR 1.05 06/15/2025    GFRESTIMATED 74 06/15/2025    CHERI 8.7 (L) 06/15/2025       Lab Results   Component Value Date    WBC 5.8 06/15/2025    WBC 11.1 05/06/2021     Lab Results   Component Value Date    RBC 4.21 06/15/2025    RBC 5.45 05/06/2021     Lab Results   Component Value Date    HGB 10.9 06/15/2025    HGB 16.3 05/06/2021     Lab Results   Component Value Date    HCT 34.9 06/15/2025    HCT 49.4 05/06/2021     Lab Results   Component Value Date    MCV 83 06/15/2025    MCV 91 05/06/2021     Lab Results   Component Value Date    MCH 25.9 06/15/2025    MCH 29.5 05/06/2021     Lab Results   Component Value Date    MCHC 31.2 06/15/2025    MCHC 32.6 05/06/2021     Lab Results   Component Value Date    RDW 13.8 06/15/2025    RDW 13.6 05/06/2021     Lab Results   Component Value Date     06/15/2025     05/06/2021     INR   Date Value Ref Range Status   06/26/2025 2.41 (H) 0.85 - 1.15 Final   06/21/2021 2.9 <5.0 Final     Comment:     Adult Normal Range: 0.90 - 1.10  Therapeutic Range: 2.0 - 3.5       INR (External)   Date Value Ref Range Status   04/14/2025 3.9 (A) 0.9 - 1.1 Final        ASSESSMENT/PLAN:    Generalized weakness  Recurrent Falls  Peripheral Neuropathy  Presented with two weeks of increased weakness and two recent falls in late May/early June, causing him to be essentially bed-bound. No acute fractures found on admission.   Suspect falls are likely from combination of deconditioning and muscle weakness, BLE edema (currently well managed), and diabetic neuropathy.   PT/OT, SW for discharge planning to safe environment      Chronic AF on Warfarin  asymptomatic A-fib  with RVR, requiring restart of rate control with Metoprolol. EKG with rate controlled AF.   Per review of recent cardiology note, his cardiologist wants to avoid beta blockers and the plan was to start diltiazem if rate control needed for his Afib.   - Diltiazem 30 mg QID; may need ongoing titration  - Cards follow up appt scheduled for 7/9  - Labs stable as above  INR stable   Currently on Coumadin 9mg daily at HS. Goal INR 2-3  Recheck INR 7/7     Unsafe living environment  Notes he lives alone, per hospital notes floor covered with food, urine.   SW for discharge planning       Anemia  Hgb trending down slowly over past couple days from 12-13 --> 10.9. No noted bleeding source, no hematochezia or hematemesis, and no noted increased swelling or signs of increased fluid retention on exam.   Continue to monitor      MICHELLE, prerenal, resolved  Hyponatremia, resolved  Hypokalemia, resolved   Cr on admit 1.42, baseline appears to be around 1.0.   Sodium 126 on admission with baseline ~135. Here, has fluctuated between hyponatremia and hypernatremia throughout admission, which is consistent with his history. Hypokalemia and hyponatremia likely 2/2 torsemide and chlorthalidone use.  - F/U Cardiology outpatient   - Continue PTA torsemide 100 mg daily  - HOLDING PTA chlorthalidone (BP and edema well controlled without this med)              - if increased swelling or increased BP, consider restarting  - KCl 40 mEq supplement daily (changed from BID)  Recheck labs stable as above, Na 131      Constipation  Has Senna PRN, Miralax PRN      HFpEF without current exacerbation  Chronic AF on Warfarin  HTN  HLD  EF 55-60%.   Follow with HF clinic July 9  BLE edema currently better, continue regular compression stocking use and skin cares.   - HOLD the chlorthalidone 25 mg daily-may need to restart.   -Continue diltiazem 30 mg four times daily, can increase as needed  -Continue K  -- PTA Mg oxide 400mg daily  -- PTA Torsemide  100mg  -- PTA Spironolactone 50mg daily   -- PTA ASA 81 daily  -- PTA Atorvastatin 40mg daily   Daily weights, stable      T2DM  Peripheral Neuropathy  A1C on 3/26/25 6.2, stable.   Metformin 500mg BID.   Has Ozempic but has not started yet.   He follows with podiatry and was seen on 5/21/25 with no active ulcerations and sensations diminished bilaterally.    -- PTA Metformin   -- Follow up podiatry  -Blood sugars stable in TCU, continue Metformin      Hypothyroidism  TSH on admit 0.48. WNL in 10/2024.  -- Continue PTA Synthroid 175mcg daily      MDD  Follows with therapist outpatient. No chronic medications.   Has follow up with Dr. Buenrostro at [facility], appt 6/19.           Electronically signed by:  Kerri Robins CNP       Total time greater than 35 minutes reviewing chart in EPIC and PCC, medications, labs, vitals. Discussing with social work, physical therapy, occupational therapy and nursing.

## 2025-07-01 NOTE — LETTER
7/1/2025      Clarke Moreira  [address]  [facility] [address]        [facility] GERIATRICS    PRIMARY CARE PROVIDER AND CLINIC:  Henrry Shepard MD, [address] / [facility] [id number]  Chief Complaint   Patient presents with     Follow Up      [facility] Medical Record Number:  [id number]  Place of Service where encounter took place:  [facility] (TCU) [[id number]]    Clarke Moreira  is a 75 year old  (1950), admitted to the above facility from  [facility]. Hospital stay 6/5/2025 through 6/15/2025..     Patient seen today for follow up NP visit in TCU:  1. Frequent falls    2. Chronic heart failure with preserved ejection fraction (H)    3. Essential hypertension with goal blood pressure less than 140/90    4. PAD (peripheral artery disease)    5. THONG (obstructive sleep apnea)    6. Hypothyroidism, unspecified type    7. Type 2 diabetes mellitus with stage 3b chronic kidney disease, without long-term current use of insulin (H)    8. Paroxysmal atrial fibrillation (H)        HPI:    Doing well overall. Chlorthalidone stopped. He is on [place] and Torsemide.   Weights today 325.6 >329 pounds. Has Heart clinic follow up on 7/9  Denies HA, chest pain, palpitations, dizziness.  No troubles breathing, no SOB, no Concerns with urination, no constipation. Eating and drinking okay. Food that was ordered yesterday was not great. INR stable, denies bleeding.   Sleeping okay. Denies pain.   Lives at home in apartment alone, ambulates with walker.      CODE STATUS/ADVANCE DIRECTIVES DISCUSSION:  No CPR- Do NOT Intubate  DNR / DNI-Comfort focus   ALLERGIES:   Allergies   Allergen Reactions     Seasonal Allergies       PAST MEDICAL HISTORY:   Past Medical History:   Diagnosis Date     Atrial fibrillation (H)      Basal cell carcinoma      Chronic anticoagulation      Diastolic heart failure (H)      Dyslipidemia      Essential  hypertension      Morbid obesity (H)      Sleep-disordered breathing      Type 2 diabetes mellitus (H)       PAST SURGICAL HISTORY:   has a past surgical history that includes biopsy of skin lesion; Mohs micrographic procedure; and picc insertion (Right, 09/24/2017).  FAMILY HISTORY: family history includes Depression in his mother; Glaucoma in his maternal grandfather.  SOCIAL HISTORY:   reports that he has never smoked. He has never used smokeless tobacco. He reports that he does not drink alcohol and does not use drugs.  Patient's living condition: lives alone    Post Discharge Medication Reconciliation Status:   MED REC REQUIRED  Post Medication Reconciliation Status: discharge medications reconciled, continue medications without change       Current Outpatient Medications   Medication Sig Dispense Refill     aspirin (ASA) 81 MG chewable tablet Take 1 tablet (81 mg) by mouth daily. 200 tablet 2     atorvastatin (LIPITOR) 40 MG tablet Take 1 tablet (40 mg) by mouth daily 90 tablet 3     CERTAVITE/ANTIOXIDANTS tablet TAKE ONE TABLET BY MOUTH ONE TIME DAILY 100 tablet 0     [Paused] chlorthalidone (HYGROTON) 25 MG tablet Take 1 tablet (25 mg) by mouth daily. 90 tablet 11     diltiazem (CARDIZEM) 30 MG tablet Take 1 tablet (30 mg) by mouth 4 times daily.       FEROSUL 325 (65 Fe) MG tablet Take 1 tablet (325 mg) by mouth every other day. For iron deficiency anemia 60 tablet 0     hypromellose (ARTIFICIAL TEARS) 0.5 % SOLN ophthalmic solution Place 1 drop into both eyes 4 times daily as needed for dry eyes.       levothyroxine (SYNTHROID/LEVOTHROID) 175 MCG tablet Take 1 tablet (175 mcg) by mouth every morning (before breakfast) 90 tablet 0     magnesium oxide (MAG-OX) 400 MG tablet Take 1 tablet (400 mg) by mouth daily. 90 tablet 3     metFORMIN (GLUCOPHAGE) 500 MG tablet Take 1 tablet (500 mg) by mouth 2 times daily (with meals). 180 tablet 3     Omega-3 Fatty Acids (EQL FISH OIL) 1000 MG CAPS Take 1 capsule by  "mouth daily. 90 capsule 0     potassium chloride chen ER (KLOR-CON M20) 20 MEQ CR tablet Take 2 tablets (40 mEq) by mouth daily.       senna-docusate (SENEXON-S) 8.6-50 MG tablet Take 1 to 2 tablets by mouth 2 times daily as needed for constipation 180 tablet 0     spironolactone (ALDACTONE) 50 MG tablet Take 1 tablet (50 mg) by mouth daily.       torsemide (DEMADEX) 20 MG tablet Take 5 tablets (100 mg) by mouth daily.       urea (CARMOL) 10 % external lotion Apply topically 2 times daily as needed for dry skin.       vitamin C (ASCORBIC ACID) 1000 MG TABS Take 1,000 mg by mouth daily       Vitamin D, Cholecalciferol, 25 MCG (1000 UT) CAPS Take 1 capsule by mouth daily.       warfarin ANTICOAGULANT (COUMADIN) 3 MG tablet TAKE THREE TABLETS BY MOUTH AT BEDTIME 90 tablet 0     No current facility-administered medications for this visit.       ROS:  10 point ROS of systems including Constitutional, Eyes, Respiratory, Cardiovascular, Gastroenterology, Genitourinary, Integumentary, Musculoskeletal, Psychiatric were all negative except for pertinent positives noted in my HPI.    Vitals:  /80   Pulse 85   Temp 97.6  F (36.4  C)   Resp 22   Ht 1.778 m (5' 10\")   Wt (!) 149.2 kg (329 lb)   SpO2 98%   BMI 47.21 kg/m    Exam:  GENERAL APPEARANCE:  Alert, in no distress, oriented, cooperative. Sitting comfortably in chair.   ENT:  Mouth and posterior oropharynx normal, moist mucous membranes  EYES:  EOM, conjunctivae, lids, pupils and irises normal  NECK:  No adenopathy,masses or thyromegaly  RESP:  respiratory effort and palpation of chest normal, lungs clear to auscultation , no respiratory distress  CV:  Palpation and auscultation of heart done , regular rate and rhythm, no murmur, rub, or gallop, +2 edema to LE, compressions on  GI/ABDOMEN:  normal bowel sounds, soft, nontender, no hepatosplenomegaly or other masses  M/S:   moves extremities spontaneously. Ambulation not tested   SKIN:  no rashes to exposed " skin.  NEURO:   intact   PSYCH:  oriented X 3, normal insight, judgement and memory, affect and mood normal,       Lab/Diagnostic data:  Recent labs in [facility] reviewed by me today.     Last Comprehensive Metabolic Panel:  Lab Results   Component Value Date     (L) 06/15/2025    POTASSIUM 3.9 06/15/2025    CHLORIDE 95 (L) 06/15/2025    CO2 27 06/15/2025    ANIONGAP 9 06/15/2025     (H) 06/15/2025    BUN 33.0 (H) 06/15/2025    CR 1.05 06/15/2025    GFRESTIMATED 74 06/15/2025    CHERI 8.7 (L) 06/15/2025       Lab Results   Component Value Date    WBC 5.8 06/15/2025    WBC 11.1 05/06/2021     Lab Results   Component Value Date    RBC 4.21 06/15/2025    RBC 5.45 05/06/2021     Lab Results   Component Value Date    HGB 10.9 06/15/2025    HGB 16.3 05/06/2021     Lab Results   Component Value Date    HCT 34.9 06/15/2025    HCT 49.4 05/06/2021     Lab Results   Component Value Date    MCV 83 06/15/2025    MCV 91 05/06/2021     Lab Results   Component Value Date    MCH 25.9 06/15/2025    MCH 29.5 05/06/2021     Lab Results   Component Value Date    MCHC 31.2 06/15/2025    MCHC 32.6 05/06/2021     Lab Results   Component Value Date    RDW 13.8 06/15/2025    RDW 13.6 05/06/2021     Lab Results   Component Value Date     06/15/2025     05/06/2021     INR   Date Value Ref Range Status   06/26/2025 2.41 (H) 0.85 - 1.15 Final   06/21/2021 2.9 <5.0 Final     Comment:     Adult Normal Range: 0.90 - 1.10  Therapeutic Range: 2.0 - 3.5       INR (External)   Date Value Ref Range Status   04/14/2025 3.9 (A) 0.9 - 1.1 Final        ASSESSMENT/PLAN:    Generalized weakness  Recurrent Falls  Peripheral Neuropathy  Presented with two weeks of increased weakness and two recent falls in late May/early June, causing him to be essentially bed-bound. No acute fractures found on admission.   Suspect falls are likely from combination of deconditioning and muscle weakness, BLE edema (currently well managed), and diabetic  neuropathy.   PT/OT, SW for discharge planning to safe environment      Chronic AF on Warfarin  asymptomatic A-fib with RVR, requiring restart of rate control with Metoprolol. EKG with rate controlled AF.   Per review of recent cardiology note, his cardiologist wants to avoid beta blockers and the plan was to start diltiazem if rate control needed for his Afib.   - Diltiazem 30 mg QID; may need ongoing titration  - Cards follow up appt scheduled for 7/9  - Labs stable as above  INR stable   Currently on Coumadin 9mg daily at HS. Goal INR 2-3  Recheck INR 7/7     Unsafe living environment  Notes he lives alone, per hospital notes floor covered with food, urine.   SW for discharge planning       Anemia  Hgb trending down slowly over past couple days from 12-13 --> 10.9. No noted bleeding source, no hematochezia or hematemesis, and no noted increased swelling or signs of increased fluid retention on exam.   Continue to monitor      MICHELLE, prerenal, resolved  Hyponatremia, resolved  Hypokalemia, resolved   Cr on admit 1.42, baseline appears to be around 1.0.   Sodium 126 on admission with baseline ~135. Here, has fluctuated between hyponatremia and hypernatremia throughout admission, which is consistent with his history. Hypokalemia and hyponatremia likely 2/2 torsemide and chlorthalidone use.  - F/U Cardiology outpatient   - Continue PTA torsemide 100 mg daily  - HOLDING PTA chlorthalidone (BP and edema well controlled without this med)              - if increased swelling or increased BP, consider restarting  - KCl 40 mEq supplement daily (changed from BID)  Recheck labs stable as above, Na 131      Constipation  Has Senna PRN, Miralax PRN      HFpEF without current exacerbation  Chronic AF on Warfarin  HTN  HLD  EF 55-60%.   Follow with HF clinic July 9  BLE edema currently better, continue regular compression stocking use and skin cares.   - HOLD the chlorthalidone 25 mg daily-may need to restart.   -Continue  diltiazem 30 mg four times daily, can increase as needed  -Continue K  -- PTA Mg oxide 400mg daily  -- PTA Torsemide 100mg  -- PTA Spironolactone 50mg daily   -- PTA ASA 81 daily  -- PTA Atorvastatin 40mg daily   Daily weights, stable      T2DM  Peripheral Neuropathy  A1C on 3/26/25 6.2, stable.   Metformin 500mg BID.   Has Ozempic but has not started yet.   He follows with podiatry and was seen on 5/21/25 with no active ulcerations and sensations diminished bilaterally.    -- PTA Metformin   -- Follow up podiatry  -Blood sugars stable in TCU, continue Metformin      Hypothyroidism  TSH on admit 0.48. WNL in 10/2024.  -- Continue PTA Synthroid 175mcg daily      MDD  Follows with therapist outpatient. No chronic medications.   Has follow up with Dr. Buenrostro at [facility], appt 6/19.           Electronically signed by:  Kerri Robins CNP       Total time greater than 35 minutes reviewing chart in EPIC and PCC, medications, labs, vitals. Discussing with social work, physical therapy, occupational therapy and nursing.                   Sincerely,        Kerri Robins CNP    Electronically signed

## 2025-07-01 NOTE — PROGRESS NOTES
Telemedicine Visit: The patient's condition can be safely assessed and treated via an audio only encounter.      Reason for Telemedicine Visit: Patient unable to travel    Originating Site (Patient Location): Patient's other in TCU at [facility]     Distant Location (provider location):  Off-site    Consent:  The patient/guardian has verbally consented to: the potential risks and benefits of telemedicine (video visit) versus in person care; bill my insurance or make self-payment for services provided; and responsibility for payment of non-covered services.     Mode of Communication:  Telephone call via [employer]    As the provider I attest to compliance with applicable laws and regulations related to telemedicine.    [facility] Primary Care: : Integrated Behavioral Health    Behavioral Health Clinician Progress Note    Patient Name: Clarke Moreira          Service Type:  Individual      Service Location:   Phone call (patient / identified key support person reached)     Session Start Time: 8:26  Session End Time: 9:06      Session Length: 38 - 52      Attendees: Client     Service Modality:  Phone Visit    Visit Activities (Refresh list every visit): [facility] Only    Diagnostic Assessment Date: last one was 4/1/25, next due 4/1/26  Treatment Plan Review Date: last one was 4/1/25, next due 10/1/25  See Flowsheets for today's PHQ-9 and CHRIS-7 results  Previous PHQ-9:       3/15/2024     9:48 AM 7/11/2024     9:37 AM 3/26/2025     8:23 AM   PHQ-9 SCORE   PHQ-9 Total Score MyChart 19 (Moderately severe depression) 0 0   PHQ-9 Total Score 19 0 0        Proxy-reported     Previous CHRIS-7:       12/11/2018    10:31 AM 5/10/2019     8:26 AM 9/3/2019    10:35 AM   CHRIS-7 SCORE   Total Score 20 13 19     Treatment Objective(s) Addressed in This Session:  Goal 1: Clarke will learn/use coping skills to continue to reduce intensity/frequency of suicidal thoughts     Goal 2: Clarke will challenge/reframe  "negative harsh thoughts that he has about himself.     Current Stressors / Issues:  Clarke describes himself as being in a \"shroud of anxiety.\" He had not realized how anxious he had been feeling until an incident happened this morning where there was poor communication and it resulted in a scary moment for him.  He was reflecting after this occurred and realized that the energy and hopefulness he had been feeling previously was waning. He has not been progressing as he had hoped at the [facility] and his goal of returning to his apartment is seeming more and more distant.  Trying to come to terms with the possibility of assisted living. Validated and processed emotions related to this possible transition. The other thing that has been exacerbating anxiety is a conversation he had had with a friend. Clarke was worried that he had not kept the conversation focused enough on her and went to a problem solving mode that was likely not helpful.  Identified the catastrophic thoughts he was having about this situation - had a view that the friendship was going to be ruined by this.  Reframed these thoughts and discussed a more balanced way to think about the situation.  Based on these thoughts identified what might be some next action steps he can take to address the situation since it is causing him distress.      Therapeutic Interventions:  Motivational Interviewing (MI): Validated patient's thoughts, feelings and experience. Expressed and demonstrated empathy through reflective listening.  Affirmed patient's strengths and abilities.  Cognitive Behavioral Therapy (CBT): Identified and challenged maladaptive patterns of behavior and thinking that interfere with patient's life. Worked with patient to recognize irrational thought processes and identify more adaptive thoughts.      Response to treatment interventions:   Patient was receptive to interventions utilized.  Patient was engaged in the therapy process.   Patient gained " new insights into symptoms.    Progress on Treatment Objective(s) / Homework:  More anxiety, but stable     Medication Review:  No current psychiatric medications prescribed    Medication Compliance:  NA    Changes in Health Issues:   Yes: currently in a TCU     Chemical Use Review:   Substance Use: No active concerns, has been sober for many decades     Tobacco Use: No current tobacco use.      Assessment: Current Emotional / Mental Status (status of significant symptoms):  Risk status (Self / Other harm or suicidal ideation)    Patient denies current fears or concerns for personal safety.  Patient denies current or recent suicidal ideation or behaviors.  Patient denies current or recent homicidal ideation or behaviors.  Patient denies current or recent self injurious behavior or ideation.  Patient denies other safety concerns.      Appearance:   N/a telephone visit   Eye Contact:   N/a telephone visit   Psychomotor Behavior: N/a telephone visit   Attitude:   Friendly Pleasant Attentive  Orientation:   Person Place Time Situation  Speech   Rate / Production: Normal/ Responsive   Volume:  Normal   Mood:    Anxious   Affect:    Appropriate   Thought Content:  Clear   Thought Form:  Coherent  Logical   Insight:    Good     Diagnoses:  Persistent Depressive Disorder  Major Depression, recurrent, moderate  Generalized Anxiety Disorder    Recommendations & Plan:     Next appt scheduled for 7/10  When he returns home, monitor if Clarke's home services get set up as intended by [facility]    Lety Buenrostro PsyD, LP

## 2025-07-05 ENCOUNTER — LAB REQUISITION (OUTPATIENT)
Dept: LAB | Facility: CLINIC | Age: 75
End: 2025-07-05
Payer: COMMERCIAL

## 2025-07-05 DIAGNOSIS — I50.9 HEART FAILURE, UNSPECIFIED (H): ICD-10-CM

## 2025-07-07 ENCOUNTER — TRANSITIONAL CARE UNIT VISIT (OUTPATIENT)
Dept: GERIATRICS | Facility: CLINIC | Age: 75
End: 2025-07-07
Payer: COMMERCIAL

## 2025-07-07 ENCOUNTER — VIRTUAL VISIT (OUTPATIENT)
Dept: PSYCHOLOGY | Facility: CLINIC | Age: 75
End: 2025-07-07
Payer: COMMERCIAL

## 2025-07-07 VITALS
SYSTOLIC BLOOD PRESSURE: 114 MMHG | HEIGHT: 70 IN | RESPIRATION RATE: 18 BRPM | DIASTOLIC BLOOD PRESSURE: 75 MMHG | BODY MASS INDEX: 45.1 KG/M2 | OXYGEN SATURATION: 96 % | HEART RATE: 92 BPM | WEIGHT: 315 LBS | TEMPERATURE: 97.8 F

## 2025-07-07 DIAGNOSIS — G47.33 OSA (OBSTRUCTIVE SLEEP APNEA): ICD-10-CM

## 2025-07-07 DIAGNOSIS — I73.9 PAD (PERIPHERAL ARTERY DISEASE): ICD-10-CM

## 2025-07-07 DIAGNOSIS — F41.1 GAD (GENERALIZED ANXIETY DISORDER): ICD-10-CM

## 2025-07-07 DIAGNOSIS — I87.8 VENOUS STASIS: ICD-10-CM

## 2025-07-07 DIAGNOSIS — R29.6 FREQUENT FALLS: Primary | ICD-10-CM

## 2025-07-07 DIAGNOSIS — F34.1 PERSISTENT DEPRESSIVE DISORDER: Primary | ICD-10-CM

## 2025-07-07 DIAGNOSIS — I10 ESSENTIAL HYPERTENSION WITH GOAL BLOOD PRESSURE LESS THAN 140/90: ICD-10-CM

## 2025-07-07 DIAGNOSIS — F33.1 MODERATE EPISODE OF RECURRENT MAJOR DEPRESSIVE DISORDER (H): ICD-10-CM

## 2025-07-07 DIAGNOSIS — I50.32 CHRONIC HEART FAILURE WITH PRESERVED EJECTION FRACTION (H): ICD-10-CM

## 2025-07-07 DIAGNOSIS — E03.9 HYPOTHYROIDISM, UNSPECIFIED TYPE: ICD-10-CM

## 2025-07-07 DIAGNOSIS — I48.0 PAROXYSMAL ATRIAL FIBRILLATION (H): ICD-10-CM

## 2025-07-07 DIAGNOSIS — I50.22 CHRONIC SYSTOLIC CONGESTIVE HEART FAILURE (H): ICD-10-CM

## 2025-07-07 DIAGNOSIS — N18.32 TYPE 2 DIABETES MELLITUS WITH STAGE 3B CHRONIC KIDNEY DISEASE, WITHOUT LONG-TERM CURRENT USE OF INSULIN (H): ICD-10-CM

## 2025-07-07 DIAGNOSIS — E11.22 TYPE 2 DIABETES MELLITUS WITH STAGE 3B CHRONIC KIDNEY DISEASE, WITHOUT LONG-TERM CURRENT USE OF INSULIN (H): ICD-10-CM

## 2025-07-07 LAB
ANION GAP SERPL CALCULATED.3IONS-SCNC: 12 MMOL/L (ref 7–15)
BUN SERPL-MCNC: 14.7 MG/DL (ref 8–23)
CALCIUM SERPL-MCNC: 9.4 MG/DL (ref 8.8–10.4)
CHLORIDE SERPL-SCNC: 96 MMOL/L (ref 98–107)
CREAT SERPL-MCNC: 0.99 MG/DL (ref 0.67–1.17)
EGFRCR SERPLBLD CKD-EPI 2021: 79 ML/MIN/1.73M2
ERYTHROCYTE [DISTWIDTH] IN BLOOD BY AUTOMATED COUNT: 14.3 % (ref 10–15)
GLUCOSE SERPL-MCNC: 112 MG/DL (ref 70–99)
HCO3 SERPL-SCNC: 28 MMOL/L (ref 22–29)
HCT VFR BLD AUTO: 34.6 % (ref 40–53)
HGB BLD-MCNC: 10.8 G/DL (ref 13.3–17.7)
MCH RBC QN AUTO: 26.2 PG (ref 26.5–33)
MCHC RBC AUTO-ENTMCNC: 31.2 G/DL (ref 31.5–36.5)
MCV RBC AUTO: 84 FL (ref 78–100)
PLATELET # BLD AUTO: 363 10E3/UL (ref 150–450)
POTASSIUM SERPL-SCNC: 3.2 MMOL/L (ref 3.4–5.3)
RBC # BLD AUTO: 4.13 10E6/UL (ref 4.4–5.9)
SODIUM SERPL-SCNC: 136 MMOL/L (ref 135–145)
WBC # BLD AUTO: 5.1 10E3/UL (ref 4–11)

## 2025-07-07 PROCEDURE — 90834 PSYTX W PT 45 MINUTES: CPT | Mod: 93 | Performed by: PSYCHOLOGIST

## 2025-07-07 PROCEDURE — 85027 COMPLETE CBC AUTOMATED: CPT | Mod: ORL | Performed by: NURSE PRACTITIONER

## 2025-07-07 PROCEDURE — 99310 SBSQ NF CARE HIGH MDM 45: CPT | Performed by: NURSE PRACTITIONER

## 2025-07-07 PROCEDURE — P9604 ONE-WAY ALLOW PRORATED TRIP: HCPCS | Mod: ORL | Performed by: NURSE PRACTITIONER

## 2025-07-07 PROCEDURE — 80048 BASIC METABOLIC PNL TOTAL CA: CPT | Mod: ORL | Performed by: NURSE PRACTITIONER

## 2025-07-07 PROCEDURE — 36415 COLL VENOUS BLD VENIPUNCTURE: CPT | Mod: ORL | Performed by: NURSE PRACTITIONER

## 2025-07-07 NOTE — PROGRESS NOTES
Telemedicine Visit: The patient's condition can be safely assessed and treated via an audio only encounter.      Reason for Telemedicine Visit: Patient unable to travel    Originating Site (Patient Location): Patient's other in TCU at [facility]     Distant Location (provider location):  On-site    Consent:  The patient/guardian has verbally consented to: the potential risks and benefits of telemedicine (video visit) versus in person care; bill my insurance or make self-payment for services provided; and responsibility for payment of non-covered services.     Mode of Communication:  Telephone call via [employer]    As the provider I attest to compliance with applicable laws and regulations related to telemedicine.    [facility] Primary Care: : Integrated Behavioral Health    Behavioral Health Clinician Progress Note    Patient Name: Clarke Moreira          Service Type:  Individual      Service Location:   Phone call (patient / identified key support person reached)     Session Start Time: 8:21  Session End Time: 9:10      Session Length: 38 - 52      Attendees: Client     Service Modality:  Phone Visit    Visit Activities (Refresh list every visit): [facility] Only    Diagnostic Assessment Date: last one was 4/1/25, next due 4/1/26  Treatment Plan Review Date: last one was 4/1/25, next due 10/1/25  See Flowsheets for today's PHQ-9 and CHRIS-7 results  Previous PHQ-9:       3/15/2024     9:48 AM 7/11/2024     9:37 AM 3/26/2025     8:23 AM   PHQ-9 SCORE   PHQ-9 Total Score MyChart 19 (Moderately severe depression) 0 0   PHQ-9 Total Score 19 0 0        Proxy-reported     Previous CHRIS-7:       12/11/2018    10:31 AM 5/10/2019     8:26 AM 9/3/2019    10:35 AM   CHRIS-7 SCORE   Total Score 20 13 19     Treatment Objective(s) Addressed in This Session:  Goal 1: Clarke will learn/use coping skills to continue to reduce intensity/frequency of suicidal thoughts     Goal 2: Clarke will challenge/reframe  negative harsh thoughts that he has about himself.     Current Stressors / Issues:  Clarke reports that he has been processing emotions related to current events.  Continuing to grapple with what his next steps will be after discharge in terms of housing.  Thinking about this causes anxiety and overwhelm. Discussed focusing on what he needs to strengthen himself and recover each day.  If there are questions he has in regard to future housing and finances to not sit and worry by himself, but to reach out to the team members there that are helping him with that to ask his questions.      Explored narrative that he tells himself about how he fails at everything. Identified instances where this is not the case. Challenged and reframed these thoughts. Discussed other pieces of the whole story that could be added in that don't get as much attention or focus from him.     Therapeutic Interventions:  Motivational Interviewing (MI): Validated patient's thoughts, feelings and experience. Expressed respect for patient's autonomy in decision making.  Asked open-ended questions to invite patient's self-reflection and self-direction around change and what is important for them in working towards their goals.  Expressed and demonstrated empathy through reflective listening.  Affirmed patient's strengths and abilities.  Cognitive Behavioral Therapy (CBT): Identified and challenged maladaptive patterns of behavior and thinking that interfere with patient's life. Worked with patient to recognize irrational thought processes and identify more adaptive thoughts.      Response to treatment interventions:   Patient was engaged in the therapy process.      Progress on Treatment Objective(s) / Homework:  Stable     Medication Review:  No current psychiatric medications prescribed    Medication Compliance:  NA    Changes in Health Issues:   Yes: continues in TCU     Chemical Use Review:   Substance Use: No active concerns, has been sober  for many decades     Tobacco Use: No current tobacco use.      Assessment: Current Emotional / Mental Status (status of significant symptoms):  Risk status (Self / Other harm or suicidal ideation)    Patient denies current fears or concerns for personal safety.  Patient denies current or recent suicidal ideation or behaviors.  Patient denies current or recent homicidal ideation or behaviors.  Patient denies current or recent self injurious behavior or ideation.  Patient denies other safety concerns.      Appearance:   N/a telephone visit   Eye Contact:   N/a telephone visit   Psychomotor Behavior: N/a telephone visit   Attitude:   Cooperative  Interested Friendly  Orientation:   All  Speech   Rate / Production: Normal/ Responsive   Volume:  Normal   Mood:    Anxious  Elevated   Affect:    Appropriate   Thought Content:  Clear   Thought Form:  Coherent  Logical   Insight:    Good     Diagnoses:  Persistent Depressive Disorder  Major Depression, recurrent, moderate  Generalized Anxiety Disorder    Recommendations & Plan:     Next appt: 7/24  When he returns home, monitor if Clarke's home services get set up as intended by TCU    Lety Buenrostro PsyD, LP

## 2025-07-07 NOTE — LETTER
7/7/2025      Clarke Moreira  [address]  [facility] [address]        [facility] GERIATRICS    PRIMARY CARE PROVIDER AND CLINIC:  Henrry Shepard MD, [address] / [facility] [address]  Chief Complaint   Patient presents with     Follow Up      [place] Medical Record Number:  [id number]  Place of Service where encounter took place:  [facility] (TCU) [[id number]]    Clarke Moreira  is a 75 year old  (1950), admitted to the above facility from  [facility]. Hospital stay 6/5/2025 through 6/15/2025..     Patient seen today for follow up NP visit in TCU:  1. Frequent falls    2. Essential hypertension with goal blood pressure less than 140/90    3. THONG (obstructive sleep apnea)    4. Type 2 diabetes mellitus with stage 3b chronic kidney disease, without long-term current use of insulin (H)    5. Chronic systolic congestive heart failure (H)    6. Chronic heart failure with preserved ejection fraction (H)    7. PAD (peripheral artery disease)    8. Hypothyroidism, unspecified type    9. Paroxysmal atrial fibrillation (H)    10. Venous stasis        HPI:    Doing well overall. Chlorthalidone stopped. He is on [place] and Torsemide.  K 3.2 today. Discussed eating banana.   Started on Keflex over weekend for cellulitis, reports redness/pain much improved today.   Weights today 325.6 >329>327 pounds. Has Heart clinic follow up on 7/9  Denies HA, chest pain, palpitations, dizziness.  No troubles breathing, no SOB, no Concerns with urination, no constipation. Eating and drinking okay. Food that was ordered yesterday was not great. INR stable 2.3 today, denies bleeding.   Sleeping okay. Denies pain.   Lives at home in apartment alone, ambulates with walker.      CODE STATUS/ADVANCE DIRECTIVES DISCUSSION:  No CPR- Do NOT Intubate  DNR / DNI-Comfort focus   ALLERGIES:   Allergies   Allergen Reactions     Seasonal Allergies        PAST MEDICAL HISTORY:   Past Medical History:   Diagnosis Date     Atrial fibrillation (H)      Basal cell carcinoma      Chronic anticoagulation      Diastolic heart failure (H)      Dyslipidemia      Essential hypertension      Morbid obesity (H)      Sleep-disordered breathing      Type 2 diabetes mellitus (H)       PAST SURGICAL HISTORY:   has a past surgical history that includes biopsy of skin lesion; Mohs micrographic procedure; and picc insertion (Right, 09/24/2017).  FAMILY HISTORY: family history includes Depression in his mother; Glaucoma in his maternal grandfather.  SOCIAL HISTORY:   reports that he has never smoked. He has never used smokeless tobacco. He reports that he does not drink alcohol and does not use drugs.  Patient's living condition: lives alone    Post Discharge Medication Reconciliation Status:   MED REC REQUIRED  Post Medication Reconciliation Status: discharge medications reconciled, continue medications without change       Current Outpatient Medications   Medication Sig Dispense Refill     aspirin (ASA) 81 MG chewable tablet Take 1 tablet (81 mg) by mouth daily. 200 tablet 2     atorvastatin (LIPITOR) 40 MG tablet Take 1 tablet (40 mg) by mouth daily 90 tablet 3     CERTAVITE/ANTIOXIDANTS tablet TAKE ONE TABLET BY MOUTH ONE TIME DAILY 100 tablet 0     [Paused] chlorthalidone (HYGROTON) 25 MG tablet Take 1 tablet (25 mg) by mouth daily. 90 tablet 11     diltiazem (CARDIZEM) 30 MG tablet Take 1 tablet (30 mg) by mouth 4 times daily.       FEROSUL 325 (65 Fe) MG tablet Take 1 tablet (325 mg) by mouth every other day. For iron deficiency anemia 60 tablet 0     hypromellose (ARTIFICIAL TEARS) 0.5 % SOLN ophthalmic solution Place 1 drop into both eyes 4 times daily as needed for dry eyes.       levothyroxine (SYNTHROID/LEVOTHROID) 175 MCG tablet Take 1 tablet (175 mcg) by mouth every morning (before breakfast) 90 tablet 0     magnesium oxide (MAG-OX) 400 MG tablet Take 1 tablet (400 mg)  "by mouth daily. 90 tablet 3     metFORMIN (GLUCOPHAGE) 500 MG tablet Take 1 tablet (500 mg) by mouth 2 times daily (with meals). 180 tablet 3     Omega-3 Fatty Acids (EQL FISH OIL) 1000 MG CAPS Take 1 capsule by mouth daily. 90 capsule 0     potassium chloride chen ER (KLOR-CON M20) 20 MEQ CR tablet Take 2 tablets (40 mEq) by mouth daily.       senna-docusate (SENEXON-S) 8.6-50 MG tablet Take 1 to 2 tablets by mouth 2 times daily as needed for constipation 180 tablet 0     spironolactone (ALDACTONE) 50 MG tablet Take 1 tablet (50 mg) by mouth daily.       torsemide (DEMADEX) 20 MG tablet Take 5 tablets (100 mg) by mouth daily.       urea (CARMOL) 10 % external lotion Apply topically 2 times daily as needed for dry skin.       vitamin C (ASCORBIC ACID) 1000 MG TABS Take 1,000 mg by mouth daily       Vitamin D, Cholecalciferol, 25 MCG (1000 UT) CAPS Take 1 capsule by mouth daily.       warfarin ANTICOAGULANT (COUMADIN) 3 MG tablet TAKE THREE TABLETS BY MOUTH AT BEDTIME 90 tablet 0     No current facility-administered medications for this visit.       ROS:  10 point ROS of systems including Constitutional, Eyes, Respiratory, Cardiovascular, Gastroenterology, Genitourinary, Integumentary, Musculoskeletal, Psychiatric were all negative except for pertinent positives noted in my HPI.    Vitals:  /75   Pulse 92   Temp 97.8  F (36.6  C)   Resp 18   Ht 1.778 m (5' 10\")   Wt (!) 148.3 kg (327 lb)   SpO2 96%   BMI 46.92 kg/m    Exam:  GENERAL APPEARANCE:  Alert, in no distress, oriented, cooperative. Sitting comfortably in chair.   ENT:  Mouth and posterior oropharynx normal, moist mucous membranes  EYES:  EOM, conjunctivae, lids, pupils and irises normal  NECK:  No adenopathy,masses or thyromegaly  RESP:  respiratory effort and palpation of chest normal, lungs clear to auscultation , no respiratory distress  CV:  Palpation and auscultation of heart done , regular rate and rhythm, no murmur, rub, or gallop, +2 " edema to LE, compressions on  GI/ABDOMEN:  normal bowel sounds, soft, nontender, no hepatosplenomegaly or other masses  M/S:   moves extremities spontaneously. Ambulation not tested   SKIN:  no rashes to exposed skin.  NEURO:   intact   PSYCH:  oriented X 3, normal insight, judgement and memory, affect and mood normal,       Lab/Diagnostic data:  Recent labs in [facility] reviewed by me today.     Last Comprehensive Metabolic Panel:  Lab Results   Component Value Date     07/07/2025    POTASSIUM 3.2 (L) 07/07/2025    CHLORIDE 96 (L) 07/07/2025    CO2 28 07/07/2025    ANIONGAP 12 07/07/2025     (H) 07/07/2025    BUN 14.7 07/07/2025    CR 0.99 07/07/2025    GFRESTIMATED 79 07/07/2025    CHERI 9.4 07/07/2025       Lab Results   Component Value Date    WBC 5.8 06/15/2025    WBC 11.1 05/06/2021     Lab Results   Component Value Date    RBC 4.21 06/15/2025    RBC 5.45 05/06/2021     Lab Results   Component Value Date    HGB 10.9 06/15/2025    HGB 16.3 05/06/2021     Lab Results   Component Value Date    HCT 34.9 06/15/2025    HCT 49.4 05/06/2021     Lab Results   Component Value Date    MCV 83 06/15/2025    MCV 91 05/06/2021     Lab Results   Component Value Date    MCH 25.9 06/15/2025    MCH 29.5 05/06/2021     Lab Results   Component Value Date    MCHC 31.2 06/15/2025    MCHC 32.6 05/06/2021     Lab Results   Component Value Date    RDW 13.8 06/15/2025    RDW 13.6 05/06/2021     Lab Results   Component Value Date     06/15/2025     05/06/2021        INR today: 7/7:  2.3  INR   Date Value Ref Range Status   06/26/2025 2.41 (H) 0.85 - 1.15 Final   06/21/2021 2.9 <5.0 Final     Comment:     Adult Normal Range: 0.90 - 1.10  Therapeutic Range: 2.0 - 3.5       INR (External)   Date Value Ref Range Status   04/14/2025 3.9 (A) 0.9 - 1.1 Final        ASSESSMENT/PLAN:    Generalized weakness  Recurrent Falls  Peripheral Neuropathy  Presented with two weeks of increased weakness and two recent falls in late  May/early June, causing him to be essentially bed-bound. No acute fractures found on admission.   Suspect falls are likely from combination of deconditioning and muscle weakness, BLE edema (currently well managed), and diabetic neuropathy.   PT/OT, SW for discharge planning to safe environment      Cellulitis  Started over weekend with increased redness and pain  Keflex x 10 days, doing much better, continue with compressions   Will update labs Monday 7/14  Chronic AF on Warfarin  asymptomatic A-fib with RVR, requiring restart of rate control with Metoprolol. EKG with rate controlled AF.   Per review of recent cardiology note, his cardiologist wants to avoid beta blockers and the plan was to start diltiazem if rate control needed for his Afib.   - Diltiazem 30 mg QID; may need ongoing titration  - Cards follow up appt scheduled for 7/9  - Labs stable as above  INR stable   Currently on Coumadin 9mg daily at HS. Goal INR 2-3  INR today 2.3, Coumain 10mg Q HS recheck INR Monday 7/14       Unsafe living environment  Notes he lives alone, per hospital notes floor covered with food, urine.   SW for discharge planning       Anemia  Hgb trending down slowly over past couple days from 12-13 --> 10.9. No noted bleeding source, no hematochezia or hematemesis, and no noted increased swelling or signs of increased fluid retention on exam.   Continue to monitor      MICHELLE, prerenal, resolved  Hyponatremia, resolved  Hypokalemia, resolved   Cr on admit 1.42, baseline appears to be around 1.0.   Sodium 126 on admission with baseline ~135. Here, has fluctuated between hyponatremia and hypernatremia throughout admission, which is consistent with his history. Hypokalemia and hyponatremia likely 2/2 torsemide and chlorthalidone use.  - F/U Cardiology outpatient   - Continue PTA torsemide 100 mg daily  - HOLDING PTA chlorthalidone (BP and edema well controlled without this med)              - if increased swelling or increased BP,  consider restarting  - KCl 40 mEq supplement daily (changed from BID)  Recheck labs stable as above, Na 131      Constipation  Has Senna PRN, Miralax PRN      HFpEF without current exacerbation  Chronic AF on Warfarin  HTN  HLD  EF 55-60%.   Follow with HF clinic July 9  BLE edema currently better, continue regular compression stocking use and skin cares.   - HOLD the chlorthalidone 25 mg daily-may need to restart.   -Continue diltiazem 30 mg four times daily, can increase as needed  -Continue K  -- PTA Mg oxide 400mg daily  -- PTA Torsemide 100mg  -- PTA Spironolactone 50mg daily   -- PTA ASA 81 daily  -- PTA Atorvastatin 40mg daily   Daily weights, stable      T2DM  Peripheral Neuropathy  A1C on 3/26/25 6.2, stable.   Metformin 500mg BID.   Has Ozempic but has not started yet.   He follows with podiatry and was seen on 5/21/25 with no active ulcerations and sensations diminished bilaterally.    -- PTA Metformin   -- Follow up podiatry  -Blood sugars stable in TCU, continue Metformin      Hypothyroidism  TSH on admit 0.48. WNL in 10/2024.  -- Continue PTA Synthroid 175mcg daily      MDD  Follows with therapist outpatient. No chronic medications.   Has follow up with Dr. Buenrostro at [facility], appt 6/19.           Electronically signed by:  Kerri Robins CNP       Total time greater than 45 minutes reviewing chart in EPIC and PCC, medications, labs, vitals. Discussing with social work, physical therapy, occupational therapy and nursing.                   Sincerely,        Kerri Robins CNP    Electronically signed

## 2025-07-07 NOTE — PROGRESS NOTES
[facility] GERIATRICS    PRIMARY CARE PROVIDER AND CLINIC:  Henrry Shepard MD, [address] [address] / [facility] [address]  Chief Complaint   Patient presents with    Follow Up      [place] Medical Record Number:  [id number]  Place of Service where encounter took place:  [facility] (TCU) [[id number]]    Clarke Moreira  is a 75 year old  (1950), admitted to the above facility from  [facility]. Hospital stay 6/5/2025 through 6/15/2025..     Patient seen today for follow up NP visit in TCU:  1. Frequent falls    2. Essential hypertension with goal blood pressure less than 140/90    3. THONG (obstructive sleep apnea)    4. Type 2 diabetes mellitus with stage 3b chronic kidney disease, without long-term current use of insulin (H)    5. Chronic systolic congestive heart failure (H)    6. Chronic heart failure with preserved ejection fraction (H)    7. PAD (peripheral artery disease)    8. Hypothyroidism, unspecified type    9. Paroxysmal atrial fibrillation (H)    10. Venous stasis        HPI:    Doing well overall. Chlorthalidone stopped. He is on [place] and Torsemide.  K 3.2 today. Discussed eating banana.   Started on Keflex over weekend for cellulitis, reports redness/pain much improved today.   Weights today 325.6 >329>327 pounds. Has Heart clinic follow up on 7/9  Denies HA, chest pain, palpitations, dizziness.  No troubles breathing, no SOB, no Concerns with urination, no constipation. Eating and drinking okay. Food that was ordered yesterday was not great. INR stable 2.3 today, denies bleeding.   Sleeping okay. Denies pain.   Lives at home in apartment alone, ambulates with walker.      CODE STATUS/ADVANCE DIRECTIVES DISCUSSION:  No CPR- Do NOT Intubate  DNR / DNI-Comfort focus   ALLERGIES:   Allergies   Allergen Reactions    Seasonal Allergies       PAST MEDICAL HISTORY:   Past Medical History:   Diagnosis Date    Atrial fibrillation (H)      Basal cell carcinoma     Chronic anticoagulation     Diastolic heart failure (H)     Dyslipidemia     Essential hypertension     Morbid obesity (H)     Sleep-disordered breathing     Type 2 diabetes mellitus (H)       PAST SURGICAL HISTORY:   has a past surgical history that includes biopsy of skin lesion; Mohs micrographic procedure; and picc insertion (Right, 09/24/2017).  FAMILY HISTORY: family history includes Depression in his mother; Glaucoma in his maternal grandfather.  SOCIAL HISTORY:   reports that he has never smoked. He has never used smokeless tobacco. He reports that he does not drink alcohol and does not use drugs.  Patient's living condition: lives alone    Post Discharge Medication Reconciliation Status:   MED REC REQUIRED  Post Medication Reconciliation Status: discharge medications reconciled, continue medications without change       Current Outpatient Medications   Medication Sig Dispense Refill    aspirin (ASA) 81 MG chewable tablet Take 1 tablet (81 mg) by mouth daily. 200 tablet 2    atorvastatin (LIPITOR) 40 MG tablet Take 1 tablet (40 mg) by mouth daily 90 tablet 3    CERTAVITE/ANTIOXIDANTS tablet TAKE ONE TABLET BY MOUTH ONE TIME DAILY 100 tablet 0    [Paused] chlorthalidone (HYGROTON) 25 MG tablet Take 1 tablet (25 mg) by mouth daily. 90 tablet 11    diltiazem (CARDIZEM) 30 MG tablet Take 1 tablet (30 mg) by mouth 4 times daily.      FEROSUL 325 (65 Fe) MG tablet Take 1 tablet (325 mg) by mouth every other day. For iron deficiency anemia 60 tablet 0    hypromellose (ARTIFICIAL TEARS) 0.5 % SOLN ophthalmic solution Place 1 drop into both eyes 4 times daily as needed for dry eyes.      levothyroxine (SYNTHROID/LEVOTHROID) 175 MCG tablet Take 1 tablet (175 mcg) by mouth every morning (before breakfast) 90 tablet 0    magnesium oxide (MAG-OX) 400 MG tablet Take 1 tablet (400 mg) by mouth daily. 90 tablet 3    metFORMIN (GLUCOPHAGE) 500 MG tablet Take 1 tablet (500 mg) by mouth 2 times  "daily (with meals). 180 tablet 3    Omega-3 Fatty Acids (EQL FISH OIL) 1000 MG CAPS Take 1 capsule by mouth daily. 90 capsule 0    potassium chloride chen ER (KLOR-CON M20) 20 MEQ CR tablet Take 2 tablets (40 mEq) by mouth daily.      senna-docusate (SENEXON-S) 8.6-50 MG tablet Take 1 to 2 tablets by mouth 2 times daily as needed for constipation 180 tablet 0    spironolactone (ALDACTONE) 50 MG tablet Take 1 tablet (50 mg) by mouth daily.      torsemide (DEMADEX) 20 MG tablet Take 5 tablets (100 mg) by mouth daily.      urea (CARMOL) 10 % external lotion Apply topically 2 times daily as needed for dry skin.      vitamin C (ASCORBIC ACID) 1000 MG TABS Take 1,000 mg by mouth daily      Vitamin D, Cholecalciferol, 25 MCG (1000 UT) CAPS Take 1 capsule by mouth daily.      warfarin ANTICOAGULANT (COUMADIN) 3 MG tablet TAKE THREE TABLETS BY MOUTH AT BEDTIME 90 tablet 0     No current facility-administered medications for this visit.       ROS:  10 point ROS of systems including Constitutional, Eyes, Respiratory, Cardiovascular, Gastroenterology, Genitourinary, Integumentary, Musculoskeletal, Psychiatric were all negative except for pertinent positives noted in my HPI.    Vitals:  /75   Pulse 92   Temp 97.8  F (36.6  C)   Resp 18   Ht 1.778 m (5' 10\")   Wt (!) 148.3 kg (327 lb)   SpO2 96%   BMI 46.92 kg/m    Exam:  GENERAL APPEARANCE:  Alert, in no distress, oriented, cooperative. Sitting comfortably in chair.   ENT:  Mouth and posterior oropharynx normal, moist mucous membranes  EYES:  EOM, conjunctivae, lids, pupils and irises normal  NECK:  No adenopathy,masses or thyromegaly  RESP:  respiratory effort and palpation of chest normal, lungs clear to auscultation , no respiratory distress  CV:  Palpation and auscultation of heart done , regular rate and rhythm, no murmur, rub, or gallop, +2 edema to LE, compressions on  GI/ABDOMEN:  normal bowel sounds, soft, nontender, no hepatosplenomegaly or other " masses  M/S:   moves extremities spontaneously. Ambulation not tested   SKIN:  no rashes to exposed skin.  NEURO:   intact   PSYCH:  oriented X 3, normal insight, judgement and memory, affect and mood normal,       Lab/Diagnostic data:  Recent labs in [facility] reviewed by me today.     Last Comprehensive Metabolic Panel:  Lab Results   Component Value Date     07/07/2025    POTASSIUM 3.2 (L) 07/07/2025    CHLORIDE 96 (L) 07/07/2025    CO2 28 07/07/2025    ANIONGAP 12 07/07/2025     (H) 07/07/2025    BUN 14.7 07/07/2025    CR 0.99 07/07/2025    GFRESTIMATED 79 07/07/2025    CHERI 9.4 07/07/2025       Lab Results   Component Value Date    WBC 5.8 06/15/2025    WBC 11.1 05/06/2021     Lab Results   Component Value Date    RBC 4.21 06/15/2025    RBC 5.45 05/06/2021     Lab Results   Component Value Date    HGB 10.9 06/15/2025    HGB 16.3 05/06/2021     Lab Results   Component Value Date    HCT 34.9 06/15/2025    HCT 49.4 05/06/2021     Lab Results   Component Value Date    MCV 83 06/15/2025    MCV 91 05/06/2021     Lab Results   Component Value Date    MCH 25.9 06/15/2025    MCH 29.5 05/06/2021     Lab Results   Component Value Date    MCHC 31.2 06/15/2025    MCHC 32.6 05/06/2021     Lab Results   Component Value Date    RDW 13.8 06/15/2025    RDW 13.6 05/06/2021     Lab Results   Component Value Date     06/15/2025     05/06/2021        INR today: 7/7:  2.3  INR   Date Value Ref Range Status   06/26/2025 2.41 (H) 0.85 - 1.15 Final   06/21/2021 2.9 <5.0 Final     Comment:     Adult Normal Range: 0.90 - 1.10  Therapeutic Range: 2.0 - 3.5       INR (External)   Date Value Ref Range Status   04/14/2025 3.9 (A) 0.9 - 1.1 Final        ASSESSMENT/PLAN:    Generalized weakness  Recurrent Falls  Peripheral Neuropathy  Presented with two weeks of increased weakness and two recent falls in late May/early June, causing him to be essentially bed-bound. No acute fractures found on admission.   Suspect falls are  likely from combination of deconditioning and muscle weakness, BLE edema (currently well managed), and diabetic neuropathy.   PT/OT, SW for discharge planning to safe environment      Cellulitis  Started over weekend with increased redness and pain  Keflex x 10 days, doing much better, continue with compressions   Will update labs Monday 7/14  Chronic AF on Warfarin  asymptomatic A-fib with RVR, requiring restart of rate control with Metoprolol. EKG with rate controlled AF.   Per review of recent cardiology note, his cardiologist wants to avoid beta blockers and the plan was to start diltiazem if rate control needed for his Afib.   - Diltiazem 30 mg QID; may need ongoing titration  - Cards follow up appt scheduled for 7/9  - Labs stable as above  INR stable   Currently on Coumadin 9mg daily at HS. Goal INR 2-3  INR today 2.3, Coumain 10mg Q HS recheck INR Monday 7/14       Unsafe living environment  Notes he lives alone, per hospital notes floor covered with food, urine.   SW for discharge planning       Anemia  Hgb trending down slowly over past couple days from 12-13 --> 10.9. No noted bleeding source, no hematochezia or hematemesis, and no noted increased swelling or signs of increased fluid retention on exam.   Continue to monitor      MICHELLE, prerenal, resolved  Hyponatremia, resolved  Hypokalemia, resolved   Cr on admit 1.42, baseline appears to be around 1.0.   Sodium 126 on admission with baseline ~135. Here, has fluctuated between hyponatremia and hypernatremia throughout admission, which is consistent with his history. Hypokalemia and hyponatremia likely 2/2 torsemide and chlorthalidone use.  - F/U Cardiology outpatient   - Continue PTA torsemide 100 mg daily  - HOLDING PTA chlorthalidone (BP and edema well controlled without this med)              - if increased swelling or increased BP, consider restarting  - KCl 40 mEq supplement daily (changed from BID)  Recheck labs stable as above, Na  131      Constipation  Has Senna PRN, Miralax PRN      HFpEF without current exacerbation  Chronic AF on Warfarin  HTN  HLD  EF 55-60%.   Follow with HF clinic July 9  BLE edema currently better, continue regular compression stocking use and skin cares.   - HOLD the chlorthalidone 25 mg daily-may need to restart.   -Continue diltiazem 30 mg four times daily, can increase as needed  -Continue K  -- PTA Mg oxide 400mg daily  -- PTA Torsemide 100mg  -- PTA Spironolactone 50mg daily   -- PTA ASA 81 daily  -- PTA Atorvastatin 40mg daily   Daily weights, stable      T2DM  Peripheral Neuropathy  A1C on 3/26/25 6.2, stable.   Metformin 500mg BID.   Has Ozempic but has not started yet.   He follows with podiatry and was seen on 5/21/25 with no active ulcerations and sensations diminished bilaterally.    -- PTA Metformin   -- Follow up podiatry  -Blood sugars stable in TCU, continue Metformin      Hypothyroidism  TSH on admit 0.48. WNL in 10/2024.  -- Continue PTA Synthroid 175mcg daily      MDD  Follows with therapist outpatient. No chronic medications.   Has follow up with Dr. Buenrostro at [facility], appt 6/19.           Electronically signed by:  Kerri Robins CNP       Total time greater than 45 minutes reviewing chart in EPIC and PCC, medications, labs, vitals. Discussing with social work, physical therapy, occupational therapy and nursing.

## 2025-07-10 ENCOUNTER — TELEPHONE (OUTPATIENT)
Dept: ANTICOAGULATION | Facility: CLINIC | Age: 75
End: 2025-07-10
Payer: COMMERCIAL

## 2025-07-10 DIAGNOSIS — I48.0 PAROXYSMAL ATRIAL FIBRILLATION (H): ICD-10-CM

## 2025-07-10 DIAGNOSIS — Z79.01 LONG TERM (CURRENT) USE OF ANTICOAGULANTS: Primary | ICD-10-CM

## 2025-07-10 NOTE — TELEPHONE ENCOUNTER
ANTICOAGULATION     Clarke Moreira is overdue for an INR check.     Care Everywhere updated: no    STILL IN TCU    Mirna Mcknight, RN  7/10/2025  Anticoagulation Clinic  [facility] for routing messages: [email or web address]  [facility] patient phone line: [phone]

## 2025-07-11 ENCOUNTER — TRANSITIONAL CARE UNIT VISIT (OUTPATIENT)
Dept: GERIATRICS | Facility: CLINIC | Age: 75
End: 2025-07-11
Payer: COMMERCIAL

## 2025-07-11 ENCOUNTER — TELEPHONE (OUTPATIENT)
Dept: FAMILY MEDICINE | Facility: CLINIC | Age: 75
End: 2025-07-11

## 2025-07-11 VITALS
WEIGHT: 315 LBS | HEART RATE: 85 BPM | DIASTOLIC BLOOD PRESSURE: 76 MMHG | BODY MASS INDEX: 47.74 KG/M2 | TEMPERATURE: 98.1 F | OXYGEN SATURATION: 95 % | RESPIRATION RATE: 18 BRPM | SYSTOLIC BLOOD PRESSURE: 129 MMHG

## 2025-07-11 DIAGNOSIS — I87.8 VENOUS STASIS: ICD-10-CM

## 2025-07-11 DIAGNOSIS — L03.119 CELLULITIS OF LOWER EXTREMITY, UNSPECIFIED LATERALITY: ICD-10-CM

## 2025-07-11 DIAGNOSIS — S69.91XA INJURY OF RIGHT HAND, INITIAL ENCOUNTER: ICD-10-CM

## 2025-07-11 DIAGNOSIS — I73.9 PAD (PERIPHERAL ARTERY DISEASE): Primary | ICD-10-CM

## 2025-07-11 PROCEDURE — 99308 SBSQ NF CARE LOW MDM 20: CPT | Performed by: NURSE PRACTITIONER

## 2025-07-11 NOTE — TELEPHONE ENCOUNTER
CC received a voicemail from someone named Frankie with [facility] around 2pm today. States they received a pre-admission screening on this patient around a month ago, but are looking for where the patient discharged from.     [facility] or [facility], I am unsure what to do with this request and why it came to me. Are either of you familiar with the pre-admission screening assessment process with [facility]? Call back number was [phone] ext. [phone].    Let me know how I can help with this.    Ysabel Moreira  Care Coordinator

## 2025-07-11 NOTE — LETTER
" 7/11/2025      Clarke Moreira  [address]  [facility] [address]        [facility] GERIATRICS    Chief Complaint   Patient presents with     RECHECK     HPI:  Clarke Moreira is a 75 year old  (1950), who is being seen today for an episodic care visit at: [facility] ([facility]) [[id number]].       Clarke Moreira  is a 75 year old  (1950), admitted to the above facility from  [facility]. Hospital stay 6/5/2025 through 6/15/2025..      Patient seen today for follow up NP visit in [facility]:  1. Frequent falls    2. Essential hypertension with goal blood pressure less than 140/90    3. THONG (obstructive sleep apnea)    4. Type 2 diabetes mellitus with stage 3b chronic kidney disease, without long-term current use of insulin (H)    5. Chronic systolic congestive heart failure (H)    6. Chronic heart failure with preserved ejection fraction (H)    7. PAD (peripheral artery disease)    8. Hypothyroidism, unspecified type    9. Paroxysmal atrial fibrillation (H)    10. Venous stasis        HPI:  Clarke just returned after doing home transition assessment with PT.  He reports it seemed to go well but is waiting to hear what therapy thought.  He is very frustrated with the [employer] experience he had to get to his home and spends a bit of time venting about this.    Clarke is currently being treated for bilat LE cellulitis.  Reports ongoing soreness in the legs and weeping from both legs.  Does not feel it is worsening, remains afebrile.  Continues on diuretics torsemide and spirinolactone.  Denies dyspnea, orthopnea, chest pain.  States his legs are \"normal\" when he wakes in the morning but after spending time seated on bus they are now more swollen and tight.    Nursing documented a bruise and bump to his R hand.  On assessment today, it is quite colorful with a golfball sized raised area between 2nd and 3rd metacarpals on anterior " hand.  Reports swelling has decreased but remains tender.  Able to open/close hand but with discomfort.  No XR has been done.  No one knows how it happened.     Weights reviewed- stable over the past week but up 7lbs compared to admission 6/15.    Allergies, and PMH/PSH reviewed in EPIC today.  REVIEW OF SYSTEMS:  As noted in HPI    Objective:   /76   Pulse 85   Temp 98.1  F (36.7  C)   Resp 18   Wt (!) 150.9 kg (332 lb 11.2 oz)   SpO2 95%   BMI 47.74 kg/m    GENERAL APPEARANCE:  Alert, morbidly obese, cooperative  RESP:  respiratory effort and palpation of chest normal, lungs clear to auscultation   CV:  regular rate and rhythm, no murmur, rub, or gallop, +2 pitting edema bilat LE  M/S:   R hand:  ecchymosis throughout anterior hand with golf-ball sized bump between 2nd-3rd MC.  Tender with palpation of the area.  Skin intact.  Intact hand ROM, finger opposition.    SKIN:  Bilat LE- redness, small fluid filled blisters to skin.  Bandages over openly weeping areas.  PSYCH:  oriented X 3, initially frustrated by transportation experience but improves after venting    Most Recent 2 CBC's:  Recent Labs   Lab Test 07/07/25  0609 06/15/25  0819   WBC 5.1 5.8   HGB 10.8* 10.9*   MCV 84 83    262     Most Recent 2 BMP's:  Recent Labs   Lab Test 07/07/25  0609 06/15/25  0819    131*   POTASSIUM 3.2* 3.9   CHLORIDE 96* 95*   CO2 28 27   BUN 14.7 33.0*   CR 0.99 1.05   ANIONGAP 12 9   CHERI 9.4 8.7*   * 160*       Assessment/Plan:  (I73.9) PAD (peripheral artery disease)  (primary encounter diagnosis)  (I87.8) Venous stasis  (L03.119) Cellulitis of lower extremity, unspecified laterality  Chronic edema, worse today after outing and legs in dependent position for prolonged period of time.  Started Keflex over the past weekend for cellulitis- continues to improve in regard to pain/redness.  Weights are up 7lbs since admission a month ago but no overt signs of fluid overload.  He was supposed to see  cardiology 7/9 but appointment missed (unknown why).  Has not yet rescheduled.  Chlorthalidone held with last hospital stay.  Remains on torsemide 100mg daily and spirinolactone.    Plan:   -Complete 10 day course keflex  -Monitor for resolution cellulitis  -Reschedule cardiology appointment  -Continue to monitor daily weights, notify provider for >3lbs weight gain in 24 hours or 5lbs in 1 week  -Encourage elevation of legs, compression    (S69.91XA) Injury of right hand, initial encounter  Acute.  Concern for fracture given amount of bruising, swelling.  Pain not horrible but present.  He has refused XR to this point.  Discussed with him today.  He would like to wait to hear if he is discharging soon, if he is not leaving anytime soon, would want to get the XR.  Otherwise wants to wait until he is discharged and will see ortho.  Presently not requesting anything for pain.  Plan:   -Ice PRN  -Elevation  -Recommend XRAY R hand pending discharge plans  -If discharging, needs Xray done in community  -Consider adding scheduled acetaminophen for pain    MED REC REQUIRED  Post Medication Reconciliation Status: patient was not discharged from an inpatient facility or TCU      Orders:  NNO    Electronically signed by: REAGAN Knapp CNP         Sincerely,        REAGAN Knapp CNP    Electronically signed

## 2025-07-11 NOTE — PROGRESS NOTES
"[facility] GERIATRICS    Chief Complaint   Patient presents with    RECHECK     HPI:  Clarke Moreira is a 75 year old  (1950), who is being seen today for an episodic care visit at: [facility] ([facility]) [[id number]].       Clarke Moreira  is a 75 year old  (1950), admitted to the above facility from  [facility]. Hospital stay 6/5/2025 through 6/15/2025..      Patient seen today for follow up NP visit in [facility]:  1. Frequent falls    2. Essential hypertension with goal blood pressure less than 140/90    3. THONG (obstructive sleep apnea)    4. Type 2 diabetes mellitus with stage 3b chronic kidney disease, without long-term current use of insulin (H)    5. Chronic systolic congestive heart failure (H)    6. Chronic heart failure with preserved ejection fraction (H)    7. PAD (peripheral artery disease)    8. Hypothyroidism, unspecified type    9. Paroxysmal atrial fibrillation (H)    10. Venous stasis        HPI:  Clarke just returned after doing home transition assessment with PT.  He reports it seemed to go well but is waiting to hear what therapy thought.  He is very frustrated with the [employer] experience he had to get to his home and spends a bit of time venting about this.    Clarke is currently being treated for bilat LE cellulitis.  Reports ongoing soreness in the legs and weeping from both legs.  Does not feel it is worsening, remains afebrile.  Continues on diuretics torsemide and spirinolactone.  Denies dyspnea, orthopnea, chest pain.  States his legs are \"normal\" when he wakes in the morning but after spending time seated on bus they are now more swollen and tight.    Nursing documented a bruise and bump to his R hand.  On assessment today, it is quite colorful with a golfball sized raised area between 2nd and 3rd metacarpals on anterior hand.  Reports swelling has decreased but remains tender.  Able to open/close hand but with " discomfort.  No XR has been done.  No one knows how it happened.     Weights reviewed- stable over the past week but up 7lbs compared to admission 6/15.    Allergies, and PMH/PSH reviewed in [facility] today.  REVIEW OF SYSTEMS:  As noted in HPI    Objective:   /76   Pulse 85   Temp 98.1  F (36.7  C)   Resp 18   Wt (!) 150.9 kg (332 lb 11.2 oz)   SpO2 95%   BMI 47.74 kg/m    GENERAL APPEARANCE:  Alert, morbidly obese, cooperative  RESP:  respiratory effort and palpation of chest normal, lungs clear to auscultation   CV:  regular rate and rhythm, no murmur, rub, or gallop, +2 pitting edema bilat LE  M/S:   R hand:  ecchymosis throughout anterior hand with golf-ball sized bump between 2nd-3rd MC.  Tender with palpation of the area.  Skin intact.  Intact hand ROM, finger opposition.    SKIN:  Bilat LE- redness, small fluid filled blisters to skin.  Bandages over openly weeping areas.  PSYCH:  oriented X 3, initially frustrated by transportation experience but improves after venting    Most Recent 2 CBC's:  Recent Labs   Lab Test 07/07/25  0609 06/15/25  0819   WBC 5.1 5.8   HGB 10.8* 10.9*   MCV 84 83    262     Most Recent 2 BMP's:  Recent Labs   Lab Test 07/07/25  0609 06/15/25  0819    131*   POTASSIUM 3.2* 3.9   CHLORIDE 96* 95*   CO2 28 27   BUN 14.7 33.0*   CR 0.99 1.05   ANIONGAP 12 9   CHERI 9.4 8.7*   * 160*       Assessment/Plan:  (I73.9) PAD (peripheral artery disease)  (primary encounter diagnosis)  (I87.8) Venous stasis  (L03.119) Cellulitis of lower extremity, unspecified laterality  Chronic edema, worse today after outing and legs in dependent position for prolonged period of time.  Started Keflex over the past weekend for cellulitis- continues to improve in regard to pain/redness.  Weights are up 7lbs since admission a month ago but no overt signs of fluid overload.  He was supposed to see cardiology 7/9 but appointment missed (unknown why).  Has not yet rescheduled.   Chlorthalidone held with last hospital stay.  Remains on torsemide 100mg daily and spirinolactone.    Plan:   -Complete 10 day course keflex  -Monitor for resolution cellulitis  -Reschedule cardiology appointment  -Continue to monitor daily weights, notify provider for >3lbs weight gain in 24 hours or 5lbs in 1 week  -Encourage elevation of legs, compression    (S69.91XA) Injury of right hand, initial encounter  Acute.  Concern for fracture given amount of bruising, swelling.  Pain not horrible but present.  He has refused XR to this point.  Discussed with him today.  He would like to wait to hear if he is discharging soon, if he is not leaving anytime soon, would want to get the XR.  Otherwise wants to wait until he is discharged and will see ortho.  Presently not requesting anything for pain.  Plan:   -Ice PRN  -Elevation  -Recommend XRAY R hand pending discharge plans  -If discharging, needs Xray done in community  -Consider adding scheduled acetaminophen for pain    MED REC REQUIRED  Post Medication Reconciliation Status: patient was not discharged from an inpatient facility or TCU      Orders:  NNO    Electronically signed by: REAGAN Knapp CNP

## 2025-07-13 ENCOUNTER — LAB REQUISITION (OUTPATIENT)
Dept: LAB | Facility: CLINIC | Age: 75
End: 2025-07-13
Payer: COMMERCIAL

## 2025-07-13 DIAGNOSIS — L03.90 CELLULITIS, UNSPECIFIED: ICD-10-CM

## 2025-07-14 LAB
ANION GAP SERPL CALCULATED.3IONS-SCNC: 9 MMOL/L (ref 7–15)
BUN SERPL-MCNC: 15.4 MG/DL (ref 8–23)
CALCIUM SERPL-MCNC: 9 MG/DL (ref 8.8–10.4)
CHLORIDE SERPL-SCNC: 98 MMOL/L (ref 98–107)
CREAT SERPL-MCNC: 1 MG/DL (ref 0.67–1.17)
EGFRCR SERPLBLD CKD-EPI 2021: 78 ML/MIN/1.73M2
ERYTHROCYTE [DISTWIDTH] IN BLOOD BY AUTOMATED COUNT: 14.3 % (ref 10–15)
GLUCOSE SERPL-MCNC: 111 MG/DL (ref 70–99)
HCO3 SERPL-SCNC: 29 MMOL/L (ref 22–29)
HCT VFR BLD AUTO: 33.1 % (ref 40–53)
HGB BLD-MCNC: 10.2 G/DL (ref 13.3–17.7)
MCH RBC QN AUTO: 25.4 PG (ref 26.5–33)
MCHC RBC AUTO-ENTMCNC: 30.8 G/DL (ref 31.5–36.5)
MCV RBC AUTO: 82 FL (ref 78–100)
PLATELET # BLD AUTO: 350 10E3/UL (ref 150–450)
POTASSIUM SERPL-SCNC: 3.7 MMOL/L (ref 3.4–5.3)
RBC # BLD AUTO: 4.02 10E6/UL (ref 4.4–5.9)
SODIUM SERPL-SCNC: 136 MMOL/L (ref 135–145)
WBC # BLD AUTO: 5.3 10E3/UL (ref 4–11)

## 2025-07-14 PROCEDURE — 80048 BASIC METABOLIC PNL TOTAL CA: CPT | Mod: ORL | Performed by: NURSE PRACTITIONER

## 2025-07-14 PROCEDURE — 36415 COLL VENOUS BLD VENIPUNCTURE: CPT | Mod: ORL | Performed by: NURSE PRACTITIONER

## 2025-07-14 PROCEDURE — P9604 ONE-WAY ALLOW PRORATED TRIP: HCPCS | Mod: ORL | Performed by: NURSE PRACTITIONER

## 2025-07-14 PROCEDURE — 85027 COMPLETE CBC AUTOMATED: CPT | Mod: ORL | Performed by: NURSE PRACTITIONER

## 2025-07-14 NOTE — TELEPHONE ENCOUNTER
contacted [facility] line and provided patient's discharge date and location.    YAAKOV Hernandez

## 2025-07-22 ENCOUNTER — TRANSITIONAL CARE UNIT VISIT (OUTPATIENT)
Dept: GERIATRICS | Facility: CLINIC | Age: 75
End: 2025-07-22
Payer: COMMERCIAL

## 2025-07-22 VITALS
TEMPERATURE: 97.8 F | HEIGHT: 70 IN | HEART RATE: 75 BPM | WEIGHT: 315 LBS | SYSTOLIC BLOOD PRESSURE: 136 MMHG | DIASTOLIC BLOOD PRESSURE: 85 MMHG | OXYGEN SATURATION: 95 % | RESPIRATION RATE: 18 BRPM | BODY MASS INDEX: 45.1 KG/M2

## 2025-07-22 DIAGNOSIS — L03.119 CELLULITIS OF LOWER EXTREMITY, UNSPECIFIED LATERALITY: Primary | ICD-10-CM

## 2025-07-22 DIAGNOSIS — E03.9 HYPOTHYROIDISM, UNSPECIFIED TYPE: ICD-10-CM

## 2025-07-22 DIAGNOSIS — I87.8 VENOUS STASIS: ICD-10-CM

## 2025-07-22 DIAGNOSIS — I73.9 PAD (PERIPHERAL ARTERY DISEASE): ICD-10-CM

## 2025-07-22 DIAGNOSIS — E11.22 TYPE 2 DIABETES MELLITUS WITH STAGE 3B CHRONIC KIDNEY DISEASE, WITHOUT LONG-TERM CURRENT USE OF INSULIN (H): ICD-10-CM

## 2025-07-22 DIAGNOSIS — N18.32 TYPE 2 DIABETES MELLITUS WITH STAGE 3B CHRONIC KIDNEY DISEASE, WITHOUT LONG-TERM CURRENT USE OF INSULIN (H): ICD-10-CM

## 2025-07-22 DIAGNOSIS — I10 ESSENTIAL HYPERTENSION WITH GOAL BLOOD PRESSURE LESS THAN 140/90: ICD-10-CM

## 2025-07-22 DIAGNOSIS — I50.32 CHRONIC HEART FAILURE WITH PRESERVED EJECTION FRACTION (H): ICD-10-CM

## 2025-07-22 DIAGNOSIS — R29.6 FREQUENT FALLS: ICD-10-CM

## 2025-07-22 DIAGNOSIS — G47.33 OSA (OBSTRUCTIVE SLEEP APNEA): ICD-10-CM

## 2025-07-22 PROCEDURE — 99309 SBSQ NF CARE MODERATE MDM 30: CPT | Performed by: NURSE PRACTITIONER

## 2025-07-22 NOTE — PROGRESS NOTES
[facility] GERIATRICS    PRIMARY CARE PROVIDER AND CLINIC:  Henrry Shepard MD, [address] [place] [address] / [facility] [address]  Chief Complaint   Patient presents with    Follow Up      [place] Medical Record Number:  [id number]  Place of Service where encounter took place:  [facility] (TCU) [[id number]]    Clarke Moreira  is a 75 year old  (1950), admitted to the above facility from  [facility]. Hospital stay 6/5/2025 through 6/15/2025..     Patient seen today for follow up NP visit in TCU:  1. Cellulitis of lower extremity, unspecified laterality    2. PAD (peripheral artery disease)    3. Venous stasis    4. Frequent falls    5. Essential hypertension with goal blood pressure less than 140/90    6. Type 2 diabetes mellitus with stage 3b chronic kidney disease, without long-term current use of insulin (H)    7. Hypothyroidism, unspecified type    8. Chronic heart failure with preserved ejection fraction (H)    9. THONG (obstructive sleep apnea)        HPI:    Doing well overall. Weights stable.  Keflex completed for cellulitis.  Weights today 325.6 >329>327>230 pounds.   Denies HA, chest pain, palpitations, dizziness.  No troubles breathing, no SOB, no Concerns with urination, no constipation. Eating and drinking okay. INR stable, denies bleeding. Had a home eval last week that he reports did not go too well  Sleeping okay. Denies pain.   Lives at home in apartment alone, ambulates with walker.      CODE STATUS/ADVANCE DIRECTIVES DISCUSSION:  No CPR- Do NOT Intubate  DNR / DNI-Comfort focus   ALLERGIES:   Allergies   Allergen Reactions    Seasonal Allergies       PAST MEDICAL HISTORY:   Past Medical History:   Diagnosis Date    Atrial fibrillation (H)     Basal cell carcinoma     Chronic anticoagulation     Diastolic heart failure (H)     Dyslipidemia     Essential hypertension     Morbid obesity (H)     Sleep-disordered breathing     Type 2  diabetes mellitus (H)       PAST SURGICAL HISTORY:   has a past surgical history that includes biopsy of skin lesion; Mohs micrographic procedure; and picc insertion (Right, 09/24/2017).  FAMILY HISTORY: family history includes Depression in his mother; Glaucoma in his maternal grandfather.  SOCIAL HISTORY:   reports that he has never smoked. He has never used smokeless tobacco. He reports that he does not drink alcohol and does not use drugs.  Patient's living condition: lives alone    Post Discharge Medication Reconciliation Status:   MED REC REQUIRED  Post Medication Reconciliation Status: discharge medications reconciled, continue medications without change       Current Outpatient Medications   Medication Sig Dispense Refill    aspirin (ASA) 81 MG chewable tablet Take 1 tablet (81 mg) by mouth daily. 200 tablet 2    atorvastatin (LIPITOR) 40 MG tablet Take 1 tablet (40 mg) by mouth daily 90 tablet 3    CERTAVITE/ANTIOXIDANTS tablet TAKE ONE TABLET BY MOUTH ONE TIME DAILY 100 tablet 0    [Paused] chlorthalidone (HYGROTON) 25 MG tablet Take 1 tablet (25 mg) by mouth daily. 90 tablet 11    diltiazem (CARDIZEM) 30 MG tablet Take 1 tablet (30 mg) by mouth 4 times daily.      FEROSUL 325 (65 Fe) MG tablet Take 1 tablet (325 mg) by mouth every other day. For iron deficiency anemia 60 tablet 0    hypromellose (ARTIFICIAL TEARS) 0.5 % SOLN ophthalmic solution Place 1 drop into both eyes 4 times daily as needed for dry eyes.      levothyroxine (SYNTHROID/LEVOTHROID) 175 MCG tablet Take 1 tablet (175 mcg) by mouth every morning (before breakfast) 90 tablet 0    magnesium oxide (MAG-OX) 400 MG tablet Take 1 tablet (400 mg) by mouth daily. 90 tablet 3    metFORMIN (GLUCOPHAGE) 500 MG tablet Take 1 tablet (500 mg) by mouth 2 times daily (with meals). 180 tablet 3    Omega-3 Fatty Acids (EQL FISH OIL) 1000 MG CAPS Take 1 capsule by mouth daily. 90 capsule 0    potassium chloride chen ER (KLOR-CON M20) 20 MEQ CR tablet Take 2  "tablets (40 mEq) by mouth daily.      senna-docusate (SENEXON-S) 8.6-50 MG tablet Take 1 to 2 tablets by mouth 2 times daily as needed for constipation 180 tablet 0    spironolactone (ALDACTONE) 50 MG tablet Take 1 tablet (50 mg) by mouth daily.      torsemide (DEMADEX) 20 MG tablet Take 5 tablets (100 mg) by mouth daily.      urea (CARMOL) 10 % external lotion Apply topically 2 times daily as needed for dry skin.      vitamin C (ASCORBIC ACID) 1000 MG TABS Take 1,000 mg by mouth daily      Vitamin D, Cholecalciferol, 25 MCG (1000 UT) CAPS Take 1 capsule by mouth daily.      warfarin ANTICOAGULANT (COUMADIN) 3 MG tablet TAKE THREE TABLETS BY MOUTH AT BEDTIME 90 tablet 0     No current facility-administered medications for this visit.       ROS:  10 point ROS of systems including Constitutional, Eyes, Respiratory, Cardiovascular, Gastroenterology, Genitourinary, Integumentary, Musculoskeletal, Psychiatric were all negative except for pertinent positives noted in my HPI.    Vitals:  /85   Pulse 75   Temp 97.8  F (36.6  C)   Resp 18   Ht 1.778 m (5' 10\")   Wt (!) 149.7 kg (330 lb)   SpO2 95%   BMI 47.35 kg/m    Exam:  GENERAL APPEARANCE:  Alert, in no distress, oriented, cooperative. Sitting comfortably in chair.   ENT:  Mouth and posterior oropharynx normal, moist mucous membranes  EYES:  EOM, conjunctivae, lids, pupils and irises normal  NECK:  No adenopathy,masses or thyromegaly  RESP:  respiratory effort and palpation of chest normal, lungs clear to auscultation , no respiratory distress  CV:  Palpation and auscultation of heart done , regular rate and rhythm, no murmur, rub, or gallop, +2 edema to LE, compressions on  GI/ABDOMEN:  normal bowel sounds, soft, nontender, no hepatosplenomegaly or other masses  M/S:   moves extremities spontaneously. Ambulation not tested   SKIN:  no rashes to exposed skin.  NEURO:   intact   PSYCH:  oriented X 3, normal insight, judgement and memory, affect and mood " normal,       Lab/Diagnostic data:  Recent labs in [facility] reviewed by me today.       Last Comprehensive Metabolic Panel:  Lab Results   Component Value Date     07/14/2025    POTASSIUM 3.7 07/14/2025    CHLORIDE 98 07/14/2025    CO2 29 07/14/2025    ANIONGAP 9 07/14/2025     (H) 07/14/2025    BUN 15.4 07/14/2025    CR 1.00 07/14/2025    GFRESTIMATED 78 07/14/2025    CHERI 9.0 07/14/2025       Lab Results   Component Value Date    WBC 5.8 06/15/2025    WBC 11.1 05/06/2021     Lab Results   Component Value Date    RBC 4.21 06/15/2025    RBC 5.45 05/06/2021     Lab Results   Component Value Date    HGB 10.9 06/15/2025    HGB 16.3 05/06/2021     Lab Results   Component Value Date    HCT 34.9 06/15/2025    HCT 49.4 05/06/2021     Lab Results   Component Value Date    MCV 83 06/15/2025    MCV 91 05/06/2021     Lab Results   Component Value Date    MCH 25.9 06/15/2025    MCH 29.5 05/06/2021     Lab Results   Component Value Date    MCHC 31.2 06/15/2025    MCHC 32.6 05/06/2021     Lab Results   Component Value Date    RDW 13.8 06/15/2025    RDW 13.6 05/06/2021     Lab Results   Component Value Date     06/15/2025     05/06/2021        INR today: 7/7:  2.3  INR   Date Value Ref Range Status   06/26/2025 2.41 (H) 0.85 - 1.15 Final   06/21/2021 2.9 <5.0 Final     Comment:     Adult Normal Range: 0.90 - 1.10  Therapeutic Range: 2.0 - 3.5       INR (External)   Date Value Ref Range Status   04/14/2025 3.9 (A) 0.9 - 1.1 Final        ASSESSMENT/PLAN:    Generalized weakness  Recurrent Falls  Peripheral Neuropathy  Presented with two weeks of increased weakness and two recent falls in late May/early June, causing him to be essentially bed-bound. No acute fractures found on admission.   Suspect falls are likely from combination of deconditioning and muscle weakness, BLE edema (currently well managed), and diabetic neuropathy.   PT/OT, SW for discharge planning to safe environment      Cellulitis  Keflex  completed continue with compressions, elevation    Chronic AF on Warfarin  asymptomatic A-fib with RVR, requiring restart of rate control with Metoprolol. EKG with rate controlled AF.   Per review of recent cardiology note, his cardiologist wants to avoid beta blockers and the plan was to start diltiazem if rate control needed for his Afib.   - Diltiazem 30 mg QID; may need ongoing titration  - Cards follow up  - Labs stable as above  INR stable   Currently on Coumadin 9mg daily at HS. Goal INR 2-3   Coumain 10mg Q HS recheck INR Monday 7/28       Unsafe living environment  Notes he lives alone, per hospital notes floor covered with food, urine.   SW for discharge planning       Anemia  Hgb trending down slowly over past couple days from 12-13 --> 10.9. No noted bleeding source, no hematochezia or hematemesis, and no noted increased swelling or signs of increased fluid retention on exam.   Continue to monitor      MICHELLE, prerenal, resolved  Hyponatremia, resolved  Hypokalemia, resolved   Cr on admit 1.42, baseline appears to be around 1.0.   Sodium 126 on admission with baseline ~135. Here, has fluctuated between hyponatremia and hypernatremia throughout admission, which is consistent with his history. Hypokalemia and hyponatremia likely 2/2 torsemide and chlorthalidone use.  - F/U Cardiology outpatient   - Continue PTA torsemide 100 mg daily  - HOLDING PTA chlorthalidone (BP and edema well controlled without this med)              - if increased swelling or increased BP, consider restarting  - KCl 40 mEq supplement daily (changed from BID)  Recheck labs stable as above, Na 131      Constipation  Has Senna PRN, Miralax PRN      HFpEF without current exacerbation  Chronic AF on Warfarin  HTN  HLD  EF 55-60%.   Follow with HF clinic July 9  BLE edema currently better, continue regular compression stocking use and skin cares.   - HOLD the chlorthalidone 25 mg daily-may need to restart.   -Continue diltiazem 30 mg four  times daily, can increase as needed  -Continue K  -- PTA Mg oxide 400mg daily  -- PTA Torsemide 100mg  -- PTA Spironolactone 50mg daily   -- PTA ASA 81 daily  -- PTA Atorvastatin 40mg daily   Daily weights, stable      T2DM  Peripheral Neuropathy  A1C on 3/26/25 6.2, stable.   Metformin 500mg BID.   Has Ozempic but has not started yet.   He follows with podiatry and was seen on 5/21/25 with no active ulcerations and sensations diminished bilaterally.    -- PTA Metformin   -- Follow up podiatry  -Blood sugars stable in TCU, continue Metformin      Hypothyroidism  TSH on admit 0.48. WNL in 10/2024.  -- Continue PTA Synthroid 175mcg daily      MDD  Follows with therapist outpatient. No chronic medications.   Has follow up with Dr. Buenrostro at [facility], appt 6/19.           Electronically signed by:  Kerri Robins CNP       Total time greater than 35 minutes reviewing chart in EPIC and PCC, medications, labs, vitals. Discussing with social work, physical therapy, occupational therapy and nursing.

## 2025-07-22 NOTE — LETTER
7/22/2025      Clarke Moreira  [address]  [facility] [place] [address]        [facility] GERIATRICS    PRIMARY CARE PROVIDER AND CLINIC:  Henrry Shepard MD, [address] / [facility] [id number]  Chief Complaint   Patient presents with     Follow Up      [facility] Medical Record Number:  [id number]  Place of Service where encounter took place:  [facility] (TCU) [[id number]]    Clarke Moreira  is a 75 year old  (1950), admitted to the above facility from  [facility]. Hospital stay 6/5/2025 through 6/15/2025..     Patient seen today for follow up NP visit in TCU:  1. Cellulitis of lower extremity, unspecified laterality    2. PAD (peripheral artery disease)    3. Venous stasis    4. Frequent falls    5. Essential hypertension with goal blood pressure less than 140/90    6. Type 2 diabetes mellitus with stage 3b chronic kidney disease, without long-term current use of insulin (H)    7. Hypothyroidism, unspecified type    8. Chronic heart failure with preserved ejection fraction (H)    9. THONG (obstructive sleep apnea)        HPI:    Doing well overall. Weights stable.  Keflex completed for cellulitis.  Weights today 325.6 >329>327>230 pounds.   Denies HA, chest pain, palpitations, dizziness.  No troubles breathing, no SOB, no Concerns with urination, no constipation. Eating and drinking okay. INR stable, denies bleeding. Had a home eval last week that he reports did not go too well  Sleeping okay. Denies pain.   Lives at home in apartment alone, ambulates with walker.      CODE STATUS/ADVANCE DIRECTIVES DISCUSSION:  No CPR- Do NOT Intubate  DNR / DNI-Comfort focus   ALLERGIES:   Allergies   Allergen Reactions     Seasonal Allergies       PAST MEDICAL HISTORY:   Past Medical History:   Diagnosis Date     Atrial fibrillation (H)      Basal cell carcinoma      Chronic anticoagulation      Diastolic heart failure (H)       Dyslipidemia      Essential hypertension      Morbid obesity (H)      Sleep-disordered breathing      Type 2 diabetes mellitus (H)       PAST SURGICAL HISTORY:   has a past surgical history that includes biopsy of skin lesion; Mohs micrographic procedure; and picc insertion (Right, 09/24/2017).  FAMILY HISTORY: family history includes Depression in his mother; Glaucoma in his maternal grandfather.  SOCIAL HISTORY:   reports that he has never smoked. He has never used smokeless tobacco. He reports that he does not drink alcohol and does not use drugs.  Patient's living condition: lives alone    Post Discharge Medication Reconciliation Status:   MED REC REQUIRED  Post Medication Reconciliation Status: discharge medications reconciled, continue medications without change       Current Outpatient Medications   Medication Sig Dispense Refill     aspirin (ASA) 81 MG chewable tablet Take 1 tablet (81 mg) by mouth daily. 200 tablet 2     atorvastatin (LIPITOR) 40 MG tablet Take 1 tablet (40 mg) by mouth daily 90 tablet 3     CERTAVITE/ANTIOXIDANTS tablet TAKE ONE TABLET BY MOUTH ONE TIME DAILY 100 tablet 0     [Paused] chlorthalidone (HYGROTON) 25 MG tablet Take 1 tablet (25 mg) by mouth daily. 90 tablet 11     diltiazem (CARDIZEM) 30 MG tablet Take 1 tablet (30 mg) by mouth 4 times daily.       FEROSUL 325 (65 Fe) MG tablet Take 1 tablet (325 mg) by mouth every other day. For iron deficiency anemia 60 tablet 0     hypromellose (ARTIFICIAL TEARS) 0.5 % SOLN ophthalmic solution Place 1 drop into both eyes 4 times daily as needed for dry eyes.       levothyroxine (SYNTHROID/LEVOTHROID) 175 MCG tablet Take 1 tablet (175 mcg) by mouth every morning (before breakfast) 90 tablet 0     magnesium oxide (MAG-OX) 400 MG tablet Take 1 tablet (400 mg) by mouth daily. 90 tablet 3     metFORMIN (GLUCOPHAGE) 500 MG tablet Take 1 tablet (500 mg) by mouth 2 times daily (with meals). 180 tablet 3     Omega-3 Fatty Acids (EQL FISH OIL) 1000  "MG CAPS Take 1 capsule by mouth daily. 90 capsule 0     potassium chloride chen ER (KLOR-CON M20) 20 MEQ CR tablet Take 2 tablets (40 mEq) by mouth daily.       senna-docusate (SENEXON-S) 8.6-50 MG tablet Take 1 to 2 tablets by mouth 2 times daily as needed for constipation 180 tablet 0     spironolactone (ALDACTONE) 50 MG tablet Take 1 tablet (50 mg) by mouth daily.       torsemide (DEMADEX) 20 MG tablet Take 5 tablets (100 mg) by mouth daily.       urea (CARMOL) 10 % external lotion Apply topically 2 times daily as needed for dry skin.       vitamin C (ASCORBIC ACID) 1000 MG TABS Take 1,000 mg by mouth daily       Vitamin D, Cholecalciferol, 25 MCG (1000 UT) CAPS Take 1 capsule by mouth daily.       warfarin ANTICOAGULANT (COUMADIN) 3 MG tablet TAKE THREE TABLETS BY MOUTH AT BEDTIME 90 tablet 0     No current facility-administered medications for this visit.       ROS:  10 point ROS of systems including Constitutional, Eyes, Respiratory, Cardiovascular, Gastroenterology, Genitourinary, Integumentary, Musculoskeletal, Psychiatric were all negative except for pertinent positives noted in my HPI.    Vitals:  /85   Pulse 75   Temp 97.8  F (36.6  C)   Resp 18   Ht 1.778 m (5' 10\")   Wt (!) 149.7 kg (330 lb)   SpO2 95%   BMI 47.35 kg/m    Exam:  GENERAL APPEARANCE:  Alert, in no distress, oriented, cooperative. Sitting comfortably in chair.   ENT:  Mouth and posterior oropharynx normal, moist mucous membranes  EYES:  EOM, conjunctivae, lids, pupils and irises normal  NECK:  No adenopathy,masses or thyromegaly  RESP:  respiratory effort and palpation of chest normal, lungs clear to auscultation , no respiratory distress  CV:  Palpation and auscultation of heart done , regular rate and rhythm, no murmur, rub, or gallop, +2 edema to LE, compressions on  GI/ABDOMEN:  normal bowel sounds, soft, nontender, no hepatosplenomegaly or other masses  M/S:   moves extremities spontaneously. Ambulation not tested   SKIN: "  no rashes to exposed skin.  NEURO:   intact   PSYCH:  oriented X 3, normal insight, judgement and memory, affect and mood normal,       Lab/Diagnostic data:  Recent labs in [facility] reviewed by me today.       Last Comprehensive Metabolic Panel:  Lab Results   Component Value Date     07/14/2025    POTASSIUM 3.7 07/14/2025    CHLORIDE 98 07/14/2025    CO2 29 07/14/2025    ANIONGAP 9 07/14/2025     (H) 07/14/2025    BUN 15.4 07/14/2025    CR 1.00 07/14/2025    GFRESTIMATED 78 07/14/2025    CHERI 9.0 07/14/2025       Lab Results   Component Value Date    WBC 5.8 06/15/2025    WBC 11.1 05/06/2021     Lab Results   Component Value Date    RBC 4.21 06/15/2025    RBC 5.45 05/06/2021     Lab Results   Component Value Date    HGB 10.9 06/15/2025    HGB 16.3 05/06/2021     Lab Results   Component Value Date    HCT 34.9 06/15/2025    HCT 49.4 05/06/2021     Lab Results   Component Value Date    MCV 83 06/15/2025    MCV 91 05/06/2021     Lab Results   Component Value Date    MCH 25.9 06/15/2025    MCH 29.5 05/06/2021     Lab Results   Component Value Date    MCHC 31.2 06/15/2025    MCHC 32.6 05/06/2021     Lab Results   Component Value Date    RDW 13.8 06/15/2025    RDW 13.6 05/06/2021     Lab Results   Component Value Date     06/15/2025     05/06/2021        INR today: 7/7:  2.3  INR   Date Value Ref Range Status   06/26/2025 2.41 (H) 0.85 - 1.15 Final   06/21/2021 2.9 <5.0 Final     Comment:     Adult Normal Range: 0.90 - 1.10  Therapeutic Range: 2.0 - 3.5       INR (External)   Date Value Ref Range Status   04/14/2025 3.9 (A) 0.9 - 1.1 Final        ASSESSMENT/PLAN:    Generalized weakness  Recurrent Falls  Peripheral Neuropathy  Presented with two weeks of increased weakness and two recent falls in late May/early June, causing him to be essentially bed-bound. No acute fractures found on admission.   Suspect falls are likely from combination of deconditioning and muscle weakness, BLE edema (currently  well managed), and diabetic neuropathy.   PT/OT, SW for discharge planning to safe environment      Cellulitis  Keflex completed continue with compressions, elevation    Chronic AF on Warfarin  asymptomatic A-fib with RVR, requiring restart of rate control with Metoprolol. EKG with rate controlled AF.   Per review of recent cardiology note, his cardiologist wants to avoid beta blockers and the plan was to start diltiazem if rate control needed for his Afib.   - Diltiazem 30 mg QID; may need ongoing titration  - Cards follow up  - Labs stable as above  INR stable   Currently on Coumadin 9mg daily at HS. Goal INR 2-3   Coumain 10mg Q HS recheck INR Monday 7/28       Unsafe living environment  Notes he lives alone, per hospital notes floor covered with food, urine.   SW for discharge planning       Anemia  Hgb trending down slowly over past couple days from 12-13 --> 10.9. No noted bleeding source, no hematochezia or hematemesis, and no noted increased swelling or signs of increased fluid retention on exam.   Continue to monitor      MICHELLE, prerenal, resolved  Hyponatremia, resolved  Hypokalemia, resolved   Cr on admit 1.42, baseline appears to be around 1.0.   Sodium 126 on admission with baseline ~135. Here, has fluctuated between hyponatremia and hypernatremia throughout admission, which is consistent with his history. Hypokalemia and hyponatremia likely 2/2 torsemide and chlorthalidone use.  - F/U Cardiology outpatient   - Continue PTA torsemide 100 mg daily  - HOLDING PTA chlorthalidone (BP and edema well controlled without this med)              - if increased swelling or increased BP, consider restarting  - KCl 40 mEq supplement daily (changed from BID)  Recheck labs stable as above, Na 131      Constipation  Has Senna PRN, Miralax PRN      HFpEF without current exacerbation  Chronic AF on Warfarin  HTN  HLD  EF 55-60%.   Follow with HF clinic July 9  BLE edema currently better, continue regular compression  stocking use and skin cares.   - HOLD the chlorthalidone 25 mg daily-may need to restart.   -Continue diltiazem 30 mg four times daily, can increase as needed  -Continue K  -- PTA Mg oxide 400mg daily  -- PTA Torsemide 100mg  -- PTA Spironolactone 50mg daily   -- PTA ASA 81 daily  -- PTA Atorvastatin 40mg daily   Daily weights, stable      T2DM  Peripheral Neuropathy  A1C on 3/26/25 6.2, stable.   Metformin 500mg BID.   Has Ozempic but has not started yet.   He follows with podiatry and was seen on 5/21/25 with no active ulcerations and sensations diminished bilaterally.    -- PTA Metformin   -- Follow up podiatry  -Blood sugars stable in TCU, continue Metformin      Hypothyroidism  TSH on admit 0.48. WNL in 10/2024.  -- Continue PTA Synthroid 175mcg daily      MDD  Follows with therapist outpatient. No chronic medications.   Has follow up with Dr. Buenrostro at [facility], appt 6/19.           Electronically signed by:  Kerri Robins CNP       Total time greater than 35 minutes reviewing chart in EPIC and PCC, medications, labs, vitals. Discussing with social work, physical therapy, occupational therapy and nursing.                   Sincerely,        Kerri Robins CNP    Electronically signed

## 2025-07-24 ENCOUNTER — VIRTUAL VISIT (OUTPATIENT)
Dept: PSYCHOLOGY | Facility: CLINIC | Age: 75
End: 2025-07-24
Payer: COMMERCIAL

## 2025-07-24 ENCOUNTER — TELEPHONE (OUTPATIENT)
Dept: CARDIOLOGY | Facility: CLINIC | Age: 75
End: 2025-07-24
Payer: COMMERCIAL

## 2025-07-24 DIAGNOSIS — F41.1 GAD (GENERALIZED ANXIETY DISORDER): ICD-10-CM

## 2025-07-24 DIAGNOSIS — F33.1 MODERATE EPISODE OF RECURRENT MAJOR DEPRESSIVE DISORDER (H): ICD-10-CM

## 2025-07-24 DIAGNOSIS — F34.1 PERSISTENT DEPRESSIVE DISORDER: Primary | ICD-10-CM

## 2025-07-24 NOTE — TELEPHONE ENCOUNTER
Sent [facility] (1st Attempt) for the patient to call back and schedule the following:    Appointment type: Return Heart Failure  Provider: Dr. Landin  Return date: next available, ok to double book per Dr. Landin  Specialty phone number: [phone] opt 1  Additional appointment(s) needed: labs  Additonal Notes: labs prior to Mushtaq Ov

## 2025-07-24 NOTE — PROGRESS NOTES
Telemedicine Visit: The patient's condition can be safely assessed and treated via an audio only encounter.      Reason for Telemedicine Visit: Patient unable to travel    Originating Site (Patient Location): Patient's other in TCU at [facility]     Distant Location (provider location):  On-site    Consent:  The patient/guardian has verbally consented to: the potential risks and benefits of telemedicine (video visit) versus in person care; bill my insurance or make self-payment for services provided; and responsibility for payment of non-covered services.     Mode of Communication:  Telephone call via [employer]    As the provider I attest to compliance with applicable laws and regulations related to telemedicine.    [facility] Primary Care: : Integrated Behavioral Health    Behavioral Health Clinician Progress Note    Patient Name: Clarke Moreira          Service Type:  Individual      Service Location:   Phone call (patient / identified key support person reached)     Session Start Time: 8:28 Session End Time: 9:11      Session Length: 38 - 52      Attendees: Client     Service Modality:  Phone Visit    Visit Activities (Refresh list every visit): [facility] Only    Diagnostic Assessment Date: last one was 4/1/25, next due 4/1/26  Treatment Plan Review Date: last one was 4/1/25, next due 10/1/25  See Flowsheets for today's PHQ-9 and CHRIS-7 results  Previous PHQ-9:       3/15/2024     9:48 AM 7/11/2024     9:37 AM 3/26/2025     8:23 AM   PHQ-9 SCORE   PHQ-9 Total Score MyChart 19 (Moderately severe depression) 0 0   PHQ-9 Total Score 19 0 0        Proxy-reported     Previous CHRIS-7:       12/11/2018    10:31 AM 5/10/2019     8:26 AM 9/3/2019    10:35 AM   CHRIS-7 SCORE   Total Score 20 13 19     Treatment Objective(s) Addressed in This Session:  Goal 1: Clarke will learn/use coping skills to continue to reduce intensity/frequency of suicidal thoughts     Goal 2: Clarke will challenge/reframe  negative harsh thoughts that he has about himself.     Current Stressors / Issues:  Had three very good experiences in the past couple of weeks that provided evidence against many of his negative core beliefs that he deeply holds.      Therapeutic Interventions:  Motivational Interviewing (MI): Validated patient's thoughts, feelings and experience. Expressed and demonstrated empathy through reflective listening.  Affirmed patient's strengths and abilities.  Cognitive Behavioral Therapy (CBT): Identified and challenged maladaptive patterns of behavior and thinking that interfere with patient's life. Reinforced successes reported by patient since last appointment. Worked with patient to recognize irrational thought processes and identify more adaptive thoughts.    Discussed decision making around living situation once he is discharged, explores the benefits/drawbacks of different options and his thinking around these options.    Response to treatment interventions:   Patient was engaged in the therapy process.      Progress on Treatment Objective(s) / Homework:  Stable     Medication Review:  No current psychiatric medications prescribed    Medication Compliance:  NA    Changes in Health Issues:   None reported In the TCU making progress toward discharge     Chemical Use Review:   Substance Use: No active concerns, has been sober for many decades     Tobacco Use: No current tobacco use.      Assessment: Current Emotional / Mental Status (status of significant symptoms):  Risk status (Self / Other harm or suicidal ideation)    Patient denies current fears or concerns for personal safety.  Patient denies current or recent suicidal ideation or behaviors.  Patient denies current or recent homicidal ideation or behaviors.  Patient denies current or recent self injurious behavior or ideation.  Patient denies other safety concerns.      Appearance:   N/a telephone visit   Eye Contact:   N/a telephone visit   Psychomotor  Behavior: N/a telephone visit   Attitude:   Cooperative  Playful Friendly Attentive  Orientation:   All  Speech   Rate / Production: Normal/ Responsive   Volume:  Normal   Mood:    Euthymic  Affect:    Appropriate   Thought Content:  Clear   Thought Form:  Coherent  Logical   Insight:    Good     Diagnoses:  Persistent Depressive Disorder  Major Depression, recurrent, moderate  Generalized Anxiety Disorder    Recommendations & Plan:     Next appt: 8/6    Lety Buenrostro PsyD, LP

## 2025-07-26 NOTE — PLAN OF CARE
OT/6B - Cancel. Limited tolerance for therapy during PT session earlier on this date. Will reschedule OT eval as appropriate.    Dizziness

## 2025-07-29 ENCOUNTER — TRANSITIONAL CARE UNIT VISIT (OUTPATIENT)
Dept: GERIATRICS | Facility: CLINIC | Age: 75
End: 2025-07-29
Payer: COMMERCIAL

## 2025-07-29 VITALS
OXYGEN SATURATION: 95 % | HEART RATE: 90 BPM | HEIGHT: 70 IN | DIASTOLIC BLOOD PRESSURE: 71 MMHG | WEIGHT: 315 LBS | TEMPERATURE: 97.9 F | RESPIRATION RATE: 18 BRPM | SYSTOLIC BLOOD PRESSURE: 121 MMHG | BODY MASS INDEX: 45.1 KG/M2

## 2025-07-29 DIAGNOSIS — R29.6 FREQUENT FALLS: Primary | ICD-10-CM

## 2025-07-29 DIAGNOSIS — I50.32 CHRONIC HEART FAILURE WITH PRESERVED EJECTION FRACTION (H): ICD-10-CM

## 2025-07-29 DIAGNOSIS — I10 ESSENTIAL HYPERTENSION WITH GOAL BLOOD PRESSURE LESS THAN 140/90: ICD-10-CM

## 2025-07-29 DIAGNOSIS — I87.8 VENOUS STASIS: ICD-10-CM

## 2025-07-29 DIAGNOSIS — L03.119 CELLULITIS OF LOWER EXTREMITY, UNSPECIFIED LATERALITY: ICD-10-CM

## 2025-07-29 DIAGNOSIS — I73.9 PAD (PERIPHERAL ARTERY DISEASE): ICD-10-CM

## 2025-07-29 DIAGNOSIS — E03.9 HYPOTHYROIDISM, UNSPECIFIED TYPE: ICD-10-CM

## 2025-07-29 PROCEDURE — 99309 SBSQ NF CARE MODERATE MDM 30: CPT | Performed by: NURSE PRACTITIONER

## 2025-07-29 NOTE — LETTER
7/29/2025      Clarke Moreira  [address]  [facility] [address]        [facility] GERIATRICS    PRIMARY CARE PROVIDER AND CLINIC:  Henrry Shepard MD, [address] / [facility] [id number]  Chief Complaint   Patient presents with     Follow Up      [place] Medical Record Number:  [id number]  Place of Service where encounter took place:  [facility] (TCU) [[id number]]    Clarke Moreira  is a 75 year old  (1950), admitted to the above facility from  [facility]. Hospital stay 6/5/2025 through 6/15/2025..     Patient seen today for follow up NP visit in TCU:  1. Frequent falls    2. Venous stasis    3. Essential hypertension with goal blood pressure less than 140/90    4. Chronic heart failure with preserved ejection fraction (H)    5. Hypothyroidism, unspecified type    6. Cellulitis of lower extremity, unspecified laterality    7. PAD (peripheral artery disease)        HPI:    Doing well overall. Weights stable.  Keflex completed for cellulitis.  Weights stable 325.6 >329>327>330 pounds.   Denies HA, chest pain, palpitations, dizziness.  No troubles breathing, no SOB, no Concerns with urination, no constipation. Eating and drinking okay. INR stable, denies bleeding. Continues to gain strength with therapies.   Sleeping okay. Denies pain.   SLUMS 27/30. Ambulating 130 feet with 2WW  Lives at home in apartment alone, ambulates with walker.      CODE STATUS/ADVANCE DIRECTIVES DISCUSSION:  No CPR- Do NOT Intubate  DNR / DNI-Comfort focus   ALLERGIES:   Allergies   Allergen Reactions     Seasonal Allergies       PAST MEDICAL HISTORY:   Past Medical History:   Diagnosis Date     Atrial fibrillation (H)      Basal cell carcinoma      Chronic anticoagulation      Diastolic heart failure (H)      Dyslipidemia      Essential hypertension      Morbid obesity (H)      Sleep-disordered breathing      Type 2 diabetes mellitus (H)        PAST SURGICAL HISTORY:   has a past surgical history that includes biopsy of skin lesion; Mohs micrographic procedure; and picc insertion (Right, 09/24/2017).  FAMILY HISTORY: family history includes Depression in his mother; Glaucoma in his maternal grandfather.  SOCIAL HISTORY:   reports that he has never smoked. He has never used smokeless tobacco. He reports that he does not drink alcohol and does not use drugs.  Patient's living condition: lives alone    Post Discharge Medication Reconciliation Status:   MED REC REQUIRED  Post Medication Reconciliation Status: discharge medications reconciled, continue medications without change       Current Outpatient Medications   Medication Sig Dispense Refill     aspirin (ASA) 81 MG chewable tablet Take 1 tablet (81 mg) by mouth daily. 200 tablet 2     atorvastatin (LIPITOR) 40 MG tablet Take 1 tablet (40 mg) by mouth daily 90 tablet 3     CERTAVITE/ANTIOXIDANTS tablet TAKE ONE TABLET BY MOUTH ONE TIME DAILY 100 tablet 0     [Paused] chlorthalidone (HYGROTON) 25 MG tablet Take 1 tablet (25 mg) by mouth daily. 90 tablet 11     diltiazem (CARDIZEM) 30 MG tablet Take 1 tablet (30 mg) by mouth 4 times daily.       FEROSUL 325 (65 Fe) MG tablet Take 1 tablet (325 mg) by mouth every other day. For iron deficiency anemia 60 tablet 0     hypromellose (ARTIFICIAL TEARS) 0.5 % SOLN ophthalmic solution Place 1 drop into both eyes 4 times daily as needed for dry eyes.       levothyroxine (SYNTHROID/LEVOTHROID) 175 MCG tablet Take 1 tablet (175 mcg) by mouth every morning (before breakfast) 90 tablet 0     magnesium oxide (MAG-OX) 400 MG tablet Take 1 tablet (400 mg) by mouth daily. 90 tablet 3     metFORMIN (GLUCOPHAGE) 500 MG tablet Take 1 tablet (500 mg) by mouth 2 times daily (with meals). 180 tablet 3     Omega-3 Fatty Acids (EQL FISH OIL) 1000 MG CAPS Take 1 capsule by mouth daily. 90 capsule 0     potassium chloride chen ER (KLOR-CON M20) 20 MEQ CR tablet Take 2 tablets (40  "mEq) by mouth daily.       senna-docusate (SENEXON-S) 8.6-50 MG tablet Take 1 to 2 tablets by mouth 2 times daily as needed for constipation 180 tablet 0     spironolactone (ALDACTONE) 50 MG tablet Take 1 tablet (50 mg) by mouth daily.       torsemide (DEMADEX) 20 MG tablet Take 5 tablets (100 mg) by mouth daily.       urea (CARMOL) 10 % external lotion Apply topically 2 times daily as needed for dry skin.       vitamin C (ASCORBIC ACID) 1000 MG TABS Take 1,000 mg by mouth daily       Vitamin D, Cholecalciferol, 25 MCG (1000 UT) CAPS Take 1 capsule by mouth daily.       warfarin ANTICOAGULANT (COUMADIN) 3 MG tablet TAKE THREE TABLETS BY MOUTH AT BEDTIME 90 tablet 0     No current facility-administered medications for this visit.       ROS:  10 point ROS of systems including Constitutional, Eyes, Respiratory, Cardiovascular, Gastroenterology, Genitourinary, Integumentary, Musculoskeletal, Psychiatric were all negative except for pertinent positives noted in my HPI.    Vitals:  /71   Pulse 90   Temp 97.9  F (36.6  C)   Resp 18   Ht 1.778 m (5' 10\")   Wt (!) 151.5 kg (334 lb)   SpO2 95%   BMI 47.92 kg/m    Exam:  GENERAL APPEARANCE:  Alert, in no distress, oriented, cooperative. Sitting comfortably in chair.   ENT:  Mouth and posterior oropharynx normal, moist mucous membranes  EYES:  EOM, conjunctivae, lids, pupils and irises normal  NECK:  No adenopathy,masses or thyromegaly  RESP:  respiratory effort and palpation of chest normal, lungs clear to auscultation , no respiratory distress  CV:  Palpation and auscultation of heart done , regular rate and rhythm, no murmur, rub, or gallop, +2 edema to LE, compressions on  GI/ABDOMEN:  normal bowel sounds, soft, nontender, no hepatosplenomegaly or other masses  M/S:   moves extremities spontaneously. Ambulation not tested   SKIN:  no rashes to exposed skin.  NEURO:   intact   PSYCH:  oriented X 3, normal insight, judgement and memory, affect and mood normal, "       Lab/Diagnostic data:  Recent labs in [facility] reviewed by me today.       Last Comprehensive Metabolic Panel:  Lab Results   Component Value Date     07/14/2025    POTASSIUM 3.7 07/14/2025    CHLORIDE 98 07/14/2025    CO2 29 07/14/2025    ANIONGAP 9 07/14/2025     (H) 07/14/2025    BUN 15.4 07/14/2025    CR 1.00 07/14/2025    GFRESTIMATED 78 07/14/2025    CHERI 9.0 07/14/2025       Lab Results   Component Value Date    WBC 5.8 06/15/2025    WBC 11.1 05/06/2021     Lab Results   Component Value Date    RBC 4.21 06/15/2025    RBC 5.45 05/06/2021     Lab Results   Component Value Date    HGB 10.9 06/15/2025    HGB 16.3 05/06/2021     Lab Results   Component Value Date    HCT 34.9 06/15/2025    HCT 49.4 05/06/2021     Lab Results   Component Value Date    MCV 83 06/15/2025    MCV 91 05/06/2021     Lab Results   Component Value Date    MCH 25.9 06/15/2025    MCH 29.5 05/06/2021     Lab Results   Component Value Date    MCHC 31.2 06/15/2025    MCHC 32.6 05/06/2021     Lab Results   Component Value Date    RDW 13.8 06/15/2025    RDW 13.6 05/06/2021     Lab Results   Component Value Date     06/15/2025     05/06/2021        INR today: 7/7:  2.3  INR   Date Value Ref Range Status   06/26/2025 2.41 (H) 0.85 - 1.15 Final   06/21/2021 2.9 <5.0 Final     Comment:     Adult Normal Range: 0.90 - 1.10  Therapeutic Range: 2.0 - 3.5       INR (External)   Date Value Ref Range Status   04/14/2025 3.9 (A) 0.9 - 1.1 Final        ASSESSMENT/PLAN:    Generalized weakness  Recurrent Falls  Peripheral Neuropathy  Presented with two weeks of increased weakness and two recent falls in late May/early June, causing him to be essentially bed-bound. No acute fractures found on admission.   Suspect falls are likely from combination of deconditioning and muscle weakness, BLE edema (currently well managed), and diabetic neuropathy.   PT/OT, SW for discharge planning to safe environment      Cellulitis  Keflex completed,  continue with compressions, elevation  Monitor     Chronic AF on Warfarin  asymptomatic A-fib with RVR, requiring restart of rate control with Metoprolol. EKG with rate controlled AF.   Per review of recent cardiology note, his cardiologist wants to avoid beta blockers and the plan was to start diltiazem if rate control needed for his Afib.   - Diltiazem 30 mg QID; may need ongoing titration  - Cards follow up  - Labs stable as above  INR stable   Currently on Coumadin 9mg daily at HS. Goal INR 2-3  Monitor INR       Unsafe living environment  Notes he lives alone, per hospital notes floor covered with food, urine.   SW for discharge planning       Anemia  Hgb trending down slowly over past couple days from 12-13 --> 10.9. No noted bleeding source, no hematochezia or hematemesis, and no noted increased swelling or signs of increased fluid retention on exam.   Continue to monitor      MICHELLE, prerenal, resolved  Hyponatremia, resolved  Hypokalemia, resolved   Cr on admit 1.42, baseline appears to be around 1.0.   Sodium 126 on admission with baseline ~135. Here, has fluctuated between hyponatremia and hypernatremia throughout admission, which is consistent with his history. Hypokalemia and hyponatremia likely 2/2 torsemide and chlorthalidone use.  - F/U Cardiology outpatient   - Continue PTA torsemide 100 mg daily  - HOLDING PTA chlorthalidone (BP and edema well controlled without this med)              - if increased swelling or increased BP, consider restarting  - KCl 40 mEq supplement daily (changed from BID)  Recheck labs stable as above, Na 131      Constipation  Has Senna PRN, Miralax PRN      HFpEF without current exacerbation  Chronic AF on Warfarin  HTN  HLD  EF 55-60%.   Follow with HF clinic July 9  BLE edema currently better, continue regular compression stocking use and skin cares.   - HOLD the chlorthalidone 25 mg daily-may need to restart.   -Continue diltiazem 30 mg four times daily, can increase as  needed  -Continue K  -- PTA Mg oxide 400mg daily  -- PTA Torsemide 100mg  -- PTA Spironolactone 50mg daily   -- PTA ASA 81 daily  -- PTA Atorvastatin 40mg daily   Daily weights, stable      T2DM  Peripheral Neuropathy  A1C on 3/26/25 6.2, stable.   Metformin 500mg BID.   Has Ozempic but has not started yet.   He follows with podiatry and was seen on 5/21/25 with no active ulcerations and sensations diminished bilaterally.    -- PTA Metformin   -- Follow up podiatry  -Blood sugars stable in TCU, continue Metformin      Hypothyroidism  TSH on admit 0.48. WNL in 10/2024.  -- Continue PTA Synthroid 175mcg daily      MDD  Follows with therapist outpatient. No chronic medications.   Has follow up with Dr. Bu[facility]rostro at [facility]          Electronically signed by:  Kerri Robins CNP       Total time greater than 25 minutes reviewing chart in EPIC and PCC, medications, labs, vitals. Discussing with social work, physical therapy, occupational therapy and nursing.                   Sincerely,        Kerri Robins CNP    Electronically signed

## 2025-07-29 NOTE — PROGRESS NOTES
[facility] GERIATRICS    PRIMARY CARE PROVIDER AND CLINIC:  Henrry Shepard MD, [address] [address] / [facility] [address]  Chief Complaint   Patient presents with    Follow Up      [place] Medical Record Number:  [id number]  Place of Service where encounter took place:  [facility] (TCU) [[id number]]    Clarke Moreira  is a 75 year old  (1950), admitted to the above facility from  [facility]. Hospital stay 6/5/2025 through 6/15/2025..     Patient seen today for follow up NP visit in TCU:  1. Frequent falls    2. Venous stasis    3. Essential hypertension with goal blood pressure less than 140/90    4. Chronic heart failure with preserved ejection fraction (H)    5. Hypothyroidism, unspecified type    6. Cellulitis of lower extremity, unspecified laterality    7. PAD (peripheral artery disease)        HPI:    Doing well overall. Weights stable.  Keflex completed for cellulitis.  Weights stable 325.6 >329>327>330 pounds.   Denies HA, chest pain, palpitations, dizziness.  No troubles breathing, no SOB, no Concerns with urination, no constipation. Eating and drinking okay. INR stable, denies bleeding. Continues to gain strength with therapies.   Sleeping okay. Denies pain.   SLUMS 27/30. Ambulating 130 feet with 2WW  Lives at home in apartment alone, ambulates with walker.      CODE STATUS/ADVANCE DIRECTIVES DISCUSSION:  No CPR- Do NOT Intubate  DNR / DNI-Comfort focus   ALLERGIES:   Allergies   Allergen Reactions    Seasonal Allergies       PAST MEDICAL HISTORY:   Past Medical History:   Diagnosis Date    Atrial fibrillation (H)     Basal cell carcinoma     Chronic anticoagulation     Diastolic heart failure (H)     Dyslipidemia     Essential hypertension     Morbid obesity (H)     Sleep-disordered breathing     Type 2 diabetes mellitus (H)       PAST SURGICAL HISTORY:   has a past surgical history that includes biopsy of skin lesion; Mohs  micrographic procedure; and picc insertion (Right, 09/24/2017).  FAMILY HISTORY: family history includes Depression in his mother; Glaucoma in his maternal grandfather.  SOCIAL HISTORY:   reports that he has never smoked. He has never used smokeless tobacco. He reports that he does not drink alcohol and does not use drugs.  Patient's living condition: lives alone    Post Discharge Medication Reconciliation Status:   MED REC REQUIRED  Post Medication Reconciliation Status: discharge medications reconciled, continue medications without change       Current Outpatient Medications   Medication Sig Dispense Refill    aspirin (ASA) 81 MG chewable tablet Take 1 tablet (81 mg) by mouth daily. 200 tablet 2    atorvastatin (LIPITOR) 40 MG tablet Take 1 tablet (40 mg) by mouth daily 90 tablet 3    CERTAVITE/ANTIOXIDANTS tablet TAKE ONE TABLET BY MOUTH ONE TIME DAILY 100 tablet 0    [Paused] chlorthalidone (HYGROTON) 25 MG tablet Take 1 tablet (25 mg) by mouth daily. 90 tablet 11    diltiazem (CARDIZEM) 30 MG tablet Take 1 tablet (30 mg) by mouth 4 times daily.      FEROSUL 325 (65 Fe) MG tablet Take 1 tablet (325 mg) by mouth every other day. For iron deficiency anemia 60 tablet 0    hypromellose (ARTIFICIAL TEARS) 0.5 % SOLN ophthalmic solution Place 1 drop into both eyes 4 times daily as needed for dry eyes.      levothyroxine (SYNTHROID/LEVOTHROID) 175 MCG tablet Take 1 tablet (175 mcg) by mouth every morning (before breakfast) 90 tablet 0    magnesium oxide (MAG-OX) 400 MG tablet Take 1 tablet (400 mg) by mouth daily. 90 tablet 3    metFORMIN (GLUCOPHAGE) 500 MG tablet Take 1 tablet (500 mg) by mouth 2 times daily (with meals). 180 tablet 3    Omega-3 Fatty Acids (EQL FISH OIL) 1000 MG CAPS Take 1 capsule by mouth daily. 90 capsule 0    potassium chloride chen ER (KLOR-CON M20) 20 MEQ CR tablet Take 2 tablets (40 mEq) by mouth daily.      senna-docusate (SENEXON-S) 8.6-50 MG tablet Take 1 to 2 tablets by mouth 2 times  "daily as needed for constipation 180 tablet 0    spironolactone (ALDACTONE) 50 MG tablet Take 1 tablet (50 mg) by mouth daily.      torsemide (DEMADEX) 20 MG tablet Take 5 tablets (100 mg) by mouth daily.      urea (CARMOL) 10 % external lotion Apply topically 2 times daily as needed for dry skin.      vitamin C (ASCORBIC ACID) 1000 MG TABS Take 1,000 mg by mouth daily      Vitamin D, Cholecalciferol, 25 MCG (1000 UT) CAPS Take 1 capsule by mouth daily.      warfarin ANTICOAGULANT (COUMADIN) 3 MG tablet TAKE THREE TABLETS BY MOUTH AT BEDTIME 90 tablet 0     No current facility-administered medications for this visit.       ROS:  10 point ROS of systems including Constitutional, Eyes, Respiratory, Cardiovascular, Gastroenterology, Genitourinary, Integumentary, Musculoskeletal, Psychiatric were all negative except for pertinent positives noted in my HPI.    Vitals:  /71   Pulse 90   Temp 97.9  F (36.6  C)   Resp 18   Ht 1.778 m (5' 10\")   Wt (!) 151.5 kg (334 lb)   SpO2 95%   BMI 47.92 kg/m    Exam:  GENERAL APPEARANCE:  Alert, in no distress, oriented, cooperative. Sitting comfortably in chair.   ENT:  Mouth and posterior oropharynx normal, moist mucous membranes  EYES:  EOM, conjunctivae, lids, pupils and irises normal  NECK:  No adenopathy,masses or thyromegaly  RESP:  respiratory effort and palpation of chest normal, lungs clear to auscultation , no respiratory distress  CV:  Palpation and auscultation of heart done , regular rate and rhythm, no murmur, rub, or gallop, +2 edema to LE, compressions on  GI/ABDOMEN:  normal bowel sounds, soft, nontender, no hepatosplenomegaly or other masses  M/S:   moves extremities spontaneously. Ambulation not tested   SKIN:  no rashes to exposed skin.  NEURO:   intact   PSYCH:  oriented X 3, normal insight, judgement and memory, affect and mood normal,       Lab/Diagnostic data:  Recent labs in [facility] reviewed by me today.       Last Comprehensive Metabolic Panel:  Lab " Results   Component Value Date     07/14/2025    POTASSIUM 3.7 07/14/2025    CHLORIDE 98 07/14/2025    CO2 29 07/14/2025    ANIONGAP 9 07/14/2025     (H) 07/14/2025    BUN 15.4 07/14/2025    CR 1.00 07/14/2025    GFRESTIMATED 78 07/14/2025    CHERI 9.0 07/14/2025       Lab Results   Component Value Date    WBC 5.8 06/15/2025    WBC 11.1 05/06/2021     Lab Results   Component Value Date    RBC 4.21 06/15/2025    RBC 5.45 05/06/2021     Lab Results   Component Value Date    HGB 10.9 06/15/2025    HGB 16.3 05/06/2021     Lab Results   Component Value Date    HCT 34.9 06/15/2025    HCT 49.4 05/06/2021     Lab Results   Component Value Date    MCV 83 06/15/2025    MCV 91 05/06/2021     Lab Results   Component Value Date    MCH 25.9 06/15/2025    MCH 29.5 05/06/2021     Lab Results   Component Value Date    MCHC 31.2 06/15/2025    MCHC 32.6 05/06/2021     Lab Results   Component Value Date    RDW 13.8 06/15/2025    RDW 13.6 05/06/2021     Lab Results   Component Value Date     06/15/2025     05/06/2021        INR today: 7/7:  2.3  INR   Date Value Ref Range Status   06/26/2025 2.41 (H) 0.85 - 1.15 Final   06/21/2021 2.9 <5.0 Final     Comment:     Adult Normal Range: 0.90 - 1.10  Therapeutic Range: 2.0 - 3.5       INR (External)   Date Value Ref Range Status   04/14/2025 3.9 (A) 0.9 - 1.1 Final        ASSESSMENT/PLAN:    Generalized weakness  Recurrent Falls  Peripheral Neuropathy  Presented with two weeks of increased weakness and two recent falls in late May/early June, causing him to be essentially bed-bound. No acute fractures found on admission.   Suspect falls are likely from combination of deconditioning and muscle weakness, BLE edema (currently well managed), and diabetic neuropathy.   PT/OT, SW for discharge planning to safe environment      Cellulitis  Keflex completed, continue with compressions, elevation  Monitor     Chronic AF on Warfarin  asymptomatic A-fib with RVR, requiring  restart of rate control with Metoprolol. EKG with rate controlled AF.   Per review of recent cardiology note, his cardiologist wants to avoid beta blockers and the plan was to start diltiazem if rate control needed for his Afib.   - Diltiazem 30 mg QID; may need ongoing titration  - Cards follow up  - Labs stable as above  INR stable   Currently on Coumadin 9mg daily at HS. Goal INR 2-3  Monitor INR       Unsafe living environment  Notes he lives alone, per hospital notes floor covered with food, urine.   SW for discharge planning       Anemia  Hgb trending down slowly over past couple days from 12-13 --> 10.9. No noted bleeding source, no hematochezia or hematemesis, and no noted increased swelling or signs of increased fluid retention on exam.   Continue to monitor      MICHELLE, prerenal, resolved  Hyponatremia, resolved  Hypokalemia, resolved   Cr on admit 1.42, baseline appears to be around 1.0.   Sodium 126 on admission with baseline ~135. Here, has fluctuated between hyponatremia and hypernatremia throughout admission, which is consistent with his history. Hypokalemia and hyponatremia likely 2/2 torsemide and chlorthalidone use.  - F/U Cardiology outpatient   - Continue PTA torsemide 100 mg daily  - HOLDING PTA chlorthalidone (BP and edema well controlled without this med)              - if increased swelling or increased BP, consider restarting  - KCl 40 mEq supplement daily (changed from BID)  Recheck labs stable as above, Na 131      Constipation  Has Senna PRN, Miralax PRN      HFpEF without current exacerbation  Chronic AF on Warfarin  HTN  HLD  EF 55-60%.   Follow with HF clinic July 9  BLE edema currently better, continue regular compression stocking use and skin cares.   - HOLD the chlorthalidone 25 mg daily-may need to restart.   -Continue diltiazem 30 mg four times daily, can increase as needed  -Continue K  -- PTA Mg oxide 400mg daily  -- PTA Torsemide 100mg  -- PTA Spironolactone 50mg daily   -- PTA  ASA 81 daily  -- PTA Atorvastatin 40mg daily   Daily weights, stable      T2DM  Peripheral Neuropathy  A1C on 3/26/25 6.2, stable.   Metformin 500mg BID.   Has Ozempic but has not started yet.   He follows with podiatry and was seen on 5/21/25 with no active ulcerations and sensations diminished bilaterally.    -- PTA Metformin   -- Follow up podiatry  -Blood sugars stable in TCU, continue Metformin      Hypothyroidism  TSH on admit 0.48. WNL in 10/2024.  -- Continue PTA Synthroid 175mcg daily      MDD  Follows with therapist outpatient. No chronic medications.   Has follow up with Dr. Bu[facility]rostro at [facility]          Electronically signed by:  Kerri Robins CNP       Total time greater than 25 minutes reviewing chart in EPIC and PCC, medications, labs, vitals. Discussing with social work, physical therapy, occupational therapy and nursing.

## 2025-07-30 ENCOUNTER — OFFICE VISIT (OUTPATIENT)
Dept: ORTHOPEDICS | Facility: CLINIC | Age: 75
End: 2025-07-30
Payer: COMMERCIAL

## 2025-07-30 DIAGNOSIS — E11.49 TYPE II OR UNSPECIFIED TYPE DIABETES MELLITUS WITH NEUROLOGICAL MANIFESTATIONS, NOT STATED AS UNCONTROLLED(250.60) (H): Primary | ICD-10-CM

## 2025-07-30 DIAGNOSIS — L84 TYLOMA: ICD-10-CM

## 2025-07-30 DIAGNOSIS — B35.1 OM (ONYCHOMYCOSIS): ICD-10-CM

## 2025-07-30 PROCEDURE — 99213 OFFICE O/P EST LOW 20 MIN: CPT | Mod: 25 | Performed by: PODIATRIST

## 2025-07-30 PROCEDURE — 11721 DEBRIDE NAIL 6 OR MORE: CPT | Performed by: PODIATRIST

## 2025-07-30 NOTE — LETTER
7/30/2025      Clarke Moreira  [address]  [facility] [address]      Dear Colleague,    Thank you for referring your patient, Clarke Moreira, to the [facility] [place]. Please see a copy of my visit note below.    Chief Complaint   Patient presents with     RECHECK     Bilateral foot and toe recheck              Allergies   Allergen Reactions     Seasonal Allergies          Subjective: Clarke is a 75 year old male who presents to the clinic today for a diabetic foot exam and management.  He has no new open lesions. He sees Dr. Shepard for his diabetic care and was seen 5/8/25. Since his last visit, last year, he has had a few hospitalizations and a few TCU stays and is back at [facility] 2/2 fall at the beginning of June.     Objective  Hemoglobin A1C   Date Value Ref Range Status   06/05/2025 6.4 (H) <5.7 % Final     Comment:     Normal <5.7%   Prediabetes 5.7-6.4%    Diabetes 6.5% or higher     Note: Adopted from ADA consensus guidelines.   03/26/2025 6.2 (H) 0.0 - 5.6 % Final     Comment:     Normal <5.7%   Prediabetes 5.7-6.4%    Diabetes 6.5% or higher     Note: Adopted from ADA consensus guidelines.   10/07/2024 6.2 (H) <5.7 % Final     Comment:     Normal <5.7%   Prediabetes 5.7-6.4%    Diabetes 6.5% or higher     Note: Adopted from ADA consensus guidelines.   05/06/2021 6.2 (H) 0 - 5.6 % Final     Comment:     Normal <5.7% Prediabetes 5.7-6.4%  Diabetes 6.5% or higher - adopted from ADA   consensus guidelines.     11/09/2020 5.9 (H) 4.1 - 5.7 % Final   07/09/2020 5.9 (H) 0 - 5.6 % Final     Comment:     Normal <5.7% Prediabetes 5.7-6.4%  Diabetes 6.5% or higher - adopted from ADA   consensus guidelines.         DP and PT pulses 1/4 BL. CRT 1 second. Absent pedal hair.  Gross and protective sensations are diminished BL.  Hammertoes to all lesser digits. Hallux malleus BL. Equinus BL. Pes planus.   Onychomycosis to toes x10.  With debridement of the right  medial hallux nail, there was a sanguinous bulla subungually.  No signs or symptoms of infection.  Tyloma noted to the left 5th digit DIPJ, right dorsal hallux IPJ, and right 5th DIPJ  No open lesions noted.       Assessment: Clarke is a 72 YO male with DM2 and neuropathy.  Ulceration on the left dorsal hallux. Clinically, this has healed.   Onychomycosis  Tyloma.      Plan:   - Pt seen and evaluated  - Nails debrided x 10.   - Tyloma trimmed x 1.   - Daily foot exams.   - Pt to return to clinic in 12 weeks or sooner if problems arise.     Again, thank you for allowing me to participate in the care of your patient.        Sincerely,        Jesús Lagos DPM    Electronically signed

## 2025-07-30 NOTE — PROGRESS NOTES
Chief Complaint   Patient presents with    RECHECK     Bilateral foot and toe recheck              Allergies   Allergen Reactions    Seasonal Allergies          Subjective: Clarke is a 75 year old male who presents to the clinic today for a diabetic foot exam and management.  He has no new open lesions. He sees Dr. Shepard for his diabetic care and was seen 5/8/25. Since his last visit, last year, he has had a few hospitalizations and a few TCU stays and is back at [facility] 2/2 fall at the beginning of June.     Objective  Hemoglobin A1C   Date Value Ref Range Status   06/05/2025 6.4 (H) <5.7 % Final     Comment:     Normal <5.7%   Prediabetes 5.7-6.4%    Diabetes 6.5% or higher     Note: Adopted from ADA consensus guidelines.   03/26/2025 6.2 (H) 0.0 - 5.6 % Final     Comment:     Normal <5.7%   Prediabetes 5.7-6.4%    Diabetes 6.5% or higher     Note: Adopted from ADA consensus guidelines.   10/07/2024 6.2 (H) <5.7 % Final     Comment:     Normal <5.7%   Prediabetes 5.7-6.4%    Diabetes 6.5% or higher     Note: Adopted from ADA consensus guidelines.   05/06/2021 6.2 (H) 0 - 5.6 % Final     Comment:     Normal <5.7% Prediabetes 5.7-6.4%  Diabetes 6.5% or higher - adopted from ADA   consensus guidelines.     11/09/2020 5.9 (H) 4.1 - 5.7 % Final   07/09/2020 5.9 (H) 0 - 5.6 % Final     Comment:     Normal <5.7% Prediabetes 5.7-6.4%  Diabetes 6.5% or higher - adopted from ADA   consensus guidelines.         DP and PT pulses 1/4 BL. CRT 1 second. Absent pedal hair.  Gross and protective sensations are diminished BL.  Hammertoes to all lesser digits. Hallux malleus BL. Equinus BL. Pes planus.   Onychomycosis to toes x10.  With debridement of the right medial hallux nail, there was a sanguinous bulla subungually.  No signs or symptoms of infection.  Tyloma noted to the left 5th digit DIPJ, right dorsal hallux IPJ, and right 5th DIPJ  No open lesions noted.       Assessment: Clarke is a 72 YO male with DM2 and  neuropathy.  Ulceration on the left dorsal hallux. Clinically, this has healed.   Onychomycosis  Tyloma.      Plan:   - Pt seen and evaluated  - Nails debrided x 10.   - Tyloma trimmed x 1.   - Daily foot exams.   - Pt to return to clinic in 12 weeks or sooner if problems arise.

## 2025-07-31 ENCOUNTER — DISCHARGE SUMMARY NURSING HOME (OUTPATIENT)
Dept: GERIATRICS | Facility: CLINIC | Age: 75
End: 2025-07-31
Payer: COMMERCIAL

## 2025-07-31 VITALS
HEIGHT: 70 IN | TEMPERATURE: 97.7 F | OXYGEN SATURATION: 95 % | SYSTOLIC BLOOD PRESSURE: 119 MMHG | BODY MASS INDEX: 45.1 KG/M2 | WEIGHT: 315 LBS | RESPIRATION RATE: 18 BRPM | DIASTOLIC BLOOD PRESSURE: 71 MMHG | HEART RATE: 81 BPM

## 2025-07-31 DIAGNOSIS — N18.32 TYPE 2 DIABETES MELLITUS WITH STAGE 3B CHRONIC KIDNEY DISEASE, WITHOUT LONG-TERM CURRENT USE OF INSULIN (H): ICD-10-CM

## 2025-07-31 DIAGNOSIS — E03.9 HYPOTHYROIDISM, UNSPECIFIED TYPE: ICD-10-CM

## 2025-07-31 DIAGNOSIS — R29.6 FREQUENT FALLS: Primary | ICD-10-CM

## 2025-07-31 DIAGNOSIS — E11.22 TYPE 2 DIABETES MELLITUS WITH STAGE 3B CHRONIC KIDNEY DISEASE, WITHOUT LONG-TERM CURRENT USE OF INSULIN (H): ICD-10-CM

## 2025-07-31 DIAGNOSIS — Z79.01 ANTICOAGULATED: ICD-10-CM

## 2025-07-31 DIAGNOSIS — L03.119 CELLULITIS OF LOWER EXTREMITY, UNSPECIFIED LATERALITY: ICD-10-CM

## 2025-07-31 DIAGNOSIS — I50.32 CHRONIC HEART FAILURE WITH PRESERVED EJECTION FRACTION (H): ICD-10-CM

## 2025-07-31 DIAGNOSIS — I10 ESSENTIAL HYPERTENSION WITH GOAL BLOOD PRESSURE LESS THAN 140/90: ICD-10-CM

## 2025-07-31 DIAGNOSIS — R53.1 GENERALIZED WEAKNESS: ICD-10-CM

## 2025-07-31 DIAGNOSIS — I87.8 VENOUS STASIS: ICD-10-CM

## 2025-07-31 DIAGNOSIS — I73.9 PAD (PERIPHERAL ARTERY DISEASE): ICD-10-CM

## 2025-07-31 NOTE — LETTER
7/31/2025      Clarke Moreira  [address]  [facility] [address]        [facility] GERIATRICS DISCHARGE SUMMARY  PATIENT'S NAME: Clarke Moreira  YOB: 1950  MEDICAL RECORD NUMBER:  [id number]  Place of Service where encounter took place:  [facility] (TCU) [[id number]]    PRIMARY CARE PROVIDER AND CLINIC RESPONSIBLE AFTER TRANSFER:   Henrry Shepard MD, [address] / [facility] [address]    Nursing Home: [facility]     Transferring providers: Kerri Robins CNP, Luis Nova MD  Recent Hospitalization/ED:  [facility] stay 6/5/2025 to 6/15/2025.  Date of SNF Admission: 6/15/2025  Date of SNF (anticipated) Discharge: 8/2/2025  Discharged to: previous independent home  Cognitive Scores: see therapy notes   Physical Function: see therapy scores   DME: No new DME needed    CODE STATUS/ADVANCE DIRECTIVES DISCUSSION:  No CPR- Do NOT Intubate   ALLERGIES: Seasonal allergies    NURSING FACILITY COURSE   Medication Changes/Rationale:   See below     Summary of nursing facility stay:     Generalized weakness  Recurrent Falls  Peripheral Neuropathy  Presented with two weeks of increased weakness and two recent falls in late May/early June, causing him to be essentially bed-bound. No acute fractures found on admission.   Suspect falls are likely from combination of deconditioning and muscle weakness, BLE edema (currently well managed), and diabetic neuropathy.   PT/OT, SW for discharge planning to safe environment      Cellulitis  Keflex completed, continue with compressions, elevation  Monitor      Chronic AF on Warfarin  asymptomatic A-fib with RVR, requiring restart of rate control with Metoprolol. EKG with rate controlled AF.   Per review of recent cardiology note, his cardiologist wants to avoid beta blockers and the plan was to start diltiazem if rate control needed for his Afib.   -  Diltiazem 30 mg QID; may need ongoing titration  - Cards follow up  - Labs stable as above  INR stable   Currently on Coumadin 9mg daily at HS. Goal INR 2-3  Monitor INR, recheck INR Monday 8/4        Unsafe living environment  Notes he lives alone, per hospital notes floor covered with food, urine.   SW for discharge planning  Discussed LTC        Anemia  Hgb trending down slowly over past couple days from 12-13 --> 10.9. No noted bleeding source, no hematochezia or hematemesis, and no noted increased swelling or signs of increased fluid retention on exam.   Continue to monitor      MICHELLE, prerenal, resolved  Hyponatremia, resolved  Hypokalemia, resolved   Cr on admit 1.42, baseline appears to be around 1.0.   Sodium 126 on admission with baseline ~135. Here, has fluctuated between hyponatremia and hypernatremia throughout admission, which is consistent with his history. Hypokalemia and hyponatremia likely 2/2 torsemide and chlorthalidone use.  - F/U Cardiology outpatient   - Continue PTA torsemide 100 mg daily  - HOLDING PTA chlorthalidone (BP and edema well controlled without this med)              - if increased swelling or increased BP, consider restarting  - KCl 40 mEq supplement daily (changed from BID)  Recheck labs stable as above, Na 131       Constipation  Has Senna PRN, Miralax PRN        HFpEF without current exacerbation  Chronic AF on Warfarin  HTN  HLD  EF 55-60%.   Follow with HF clinic -printed weights from TCU to help guide weights at home.   BLE edema currently better, continue regular compression stocking use and skin cares.   - HOLD the chlorthalidone 25 mg daily-may need to restart.   -Continue diltiazem 30 mg four times daily, can increase as needed  -Continue K  -- PTA Mg oxide 400mg daily  -- PTA Torsemide 100mg  -- PTA Spironolactone 50mg daily   -- PTA ASA 81 daily  -- PTA Atorvastatin 40mg daily   Daily weights, stable      T2DM  Peripheral Neuropathy  A1C on 3/26/25 6.2, stable.    Metformin 500mg BID.   Has Ozempic but has not started yet.   He follows with podiatry and was seen yesterday 7/30, follow up again in 12 weeks or sooner if needed   -- PTA Metformin   -- Follow up podiatry as above  -Blood sugars stable in TCU, continue Metformin      Hypothyroidism  TSH on admit 0.48. WNL in 10/2024.  -- Continue PTA Synthroid 175mcg daily      MDD  Follows with therapist outpatient. No chronic medications.   Has follow up with Dr. Buenrostro at [facility]     Discharge Medications:  MED REC REQUIRED  Post Medication Reconciliation Status: discharge medications reconciled and changed, per note/orders       Current Outpatient Medications   Medication Sig Dispense Refill     aspirin (ASA) 81 MG chewable tablet Take 1 tablet (81 mg) by mouth daily. 200 tablet 2     atorvastatin (LIPITOR) 40 MG tablet Take 1 tablet (40 mg) by mouth daily 90 tablet 3     CERTAVITE/ANTIOXIDANTS tablet TAKE ONE TABLET BY MOUTH ONE TIME DAILY 100 tablet 0     [Paused] chlorthalidone (HYGROTON) 25 MG tablet Take 1 tablet (25 mg) by mouth daily. 90 tablet 11     diltiazem (CARDIZEM) 30 MG tablet Take 1 tablet (30 mg) by mouth 4 times daily.       FEROSUL 325 (65 Fe) MG tablet Take 1 tablet (325 mg) by mouth every other day. For iron deficiency anemia 60 tablet 0     hypromellose (ARTIFICIAL TEARS) 0.5 % SOLN ophthalmic solution Place 1 drop into both eyes 4 times daily as needed for dry eyes.       levothyroxine (SYNTHROID/LEVOTHROID) 175 MCG tablet Take 1 tablet (175 mcg) by mouth every morning (before breakfast) 90 tablet 0     magnesium oxide (MAG-OX) 400 MG tablet Take 1 tablet (400 mg) by mouth daily. 90 tablet 3     metFORMIN (GLUCOPHAGE) 500 MG tablet Take 1 tablet (500 mg) by mouth 2 times daily (with meals). 180 tablet 3     Omega-3 Fatty Acids (EQL FISH OIL) 1000 MG CAPS Take 1 capsule by mouth daily. 90 capsule 0     potassium chloride chen ER (KLOR-CON M20) 20 MEQ CR tablet Take 2 tablets (40 mEq) by  "mouth daily.       senna-docusate (SENEXON-S) 8.6-50 MG tablet Take 1 to 2 tablets by mouth 2 times daily as needed for constipation 180 tablet 0     spironolactone (ALDACTONE) 50 MG tablet Take 1 tablet (50 mg) by mouth daily.       torsemide (DEMADEX) 20 MG tablet Take 5 tablets (100 mg) by mouth daily.       urea (CARMOL) 10 % external lotion Apply topically 2 times daily as needed for dry skin.       vitamin C (ASCORBIC ACID) 1000 MG TABS Take 1,000 mg by mouth daily       Vitamin D, Cholecalciferol, 25 MCG (1000 UT) CAPS Take 1 capsule by mouth daily.       warfarin ANTICOAGULANT (COUMADIN) 3 MG tablet TAKE THREE TABLETS BY MOUTH AT BEDTIME 90 tablet 0          Controlled medications:   not applicable/none     Past Medical History:   Past Medical History:   Diagnosis Date     Atrial fibrillation (H)      Basal cell carcinoma      Chronic anticoagulation      Diastolic heart failure (H)      Dyslipidemia      Essential hypertension      Morbid obesity (H)      Sleep-disordered breathing      Type 2 diabetes mellitus (H)      Physical Exam:   Vitals: /71   Pulse 81   Temp 97.7  F (36.5  C)   Resp 18   Ht 1.778 m (5' 10\")   Wt (!) 151 kg (333 lb)   SpO2 95%   BMI 47.78 kg/m    BMI: Body mass index is 47.78 kg/m .  GENERAL APPEARANCE:  Alert, in no distress, oriented, cooperative  ENT:  Mouth and posterior oropharynx normal, moist mucous membranes  EYES:  EOM, conjunctivae, lids, pupils and irises normal  NECK:  No adenopathy,masses or thyromegaly  RESP:  respiratory effort and palpation of chest normal, lungs clear to auscultation , no respiratory distress  CV:  Palpation and auscultation of heart done , regular rate and rhythm, no murmur, rub, or gallop, +1 edema to LE, compressions on  GI/ABDOMEN:  normal bowel sounds, soft, nontender, no hepatosplenomegaly or other masses  M/S:   moves extremities spontaneously. Ambulation not tested   SKIN:  no rashes to exposed skin.   NEURO:   intact   PSYCH:  " oriented X 3, normal insight, judgement and memory, affect and mood normal,        SNF labs: Recent labs in [facility] reviewed by me today.     Last Comprehensive Metabolic Panel:  Lab Results   Component Value Date     07/14/2025    POTASSIUM 3.7 07/14/2025    CHLORIDE 98 07/14/2025    CO2 29 07/14/2025    ANIONGAP 9 07/14/2025     (H) 07/14/2025    BUN 15.4 07/14/2025    CR 1.00 07/14/2025    GFRESTIMATED 78 07/14/2025    CHERI 9.0 07/14/2025       Lab Results   Component Value Date    WBC 5.3 07/14/2025    WBC 11.1 05/06/2021     Lab Results   Component Value Date    RBC 4.02 07/14/2025    RBC 5.45 05/06/2021     Lab Results   Component Value Date    HGB 10.2 07/14/2025    HGB 16.3 05/06/2021     Lab Results   Component Value Date    HCT 33.1 07/14/2025    HCT 49.4 05/06/2021     Lab Results   Component Value Date    MCV 82 07/14/2025    MCV 91 05/06/2021     Lab Results   Component Value Date    MCH 25.4 07/14/2025    MCH 29.5 05/06/2021     Lab Results   Component Value Date    MCHC 30.8 07/14/2025    MCHC 32.6 05/06/2021     Lab Results   Component Value Date    RDW 14.3 07/14/2025    RDW 13.6 05/06/2021     Lab Results   Component Value Date     07/14/2025     05/06/2021       DISCHARGE PLAN:  Follow up labs: Recommend BMP on clinic follow up , INR on Monday 8/4  Medical Follow Up:      Follow up with primary care provider in 1 weeks  Follow up with specialist Cardiology, podiatry as directed    Current [place] scheduled appointments:  Appointments in Next Year      Jul 31, 2025 7:30 AM  Discharge Summary with Kerri Robins CNP  [facility] ([facility] ) [phone]     Aug 07, 2025 9:00 AM  Telephone Visit with Lety Buenrostro PsyD  [facility] ([facility]) [phone]     Oct 08, 2025 8:20 AM  (Arrive by 8:05 AM)  RETURN VISIT with Jesús Lagos DPM  [facility]  [facility] [place] ) [phone]           Discharge Services: Home Care:  Occupational Therapy, Physical Therapy, Registered Nurse, and Home Health Aide  Discharge Instructions Verbalized to Patient at Discharge:   Weigh yourself daily in the morning and keep a record. Call your primary clinic: a) if you are more short of breath, or b) if your weight changes more than 3 pounds in one day or more than 5 pounds in one week.     TOTAL DISCHARGE TIME:   Greater than 30 minutes  Electronically signed by:  Kerri Robins CNP     Documentation of Face to Face and Certification for Home Health Services    I certify that services are/were furnished while this patient was under the care of a physician and that a physician or an allowed non-physician practitioner (NPP), had a face-to-face encounter that meets the physician face-to-face encounter requirements. The encounter was in whole, or in part, related to the primary reason for home health. The patient is confined to his/her home and needs intermittent skilled nursing, physical therapy, speech-language pathology, or the continued need for occupational therapy. A plan of care has been established by a physician and is periodically reviewed by a physician.  Date of Face-to-Face Encounter: 7/31/2025.    I certify that, based on my findings, the following services are medically necessary home health services: Nursing, Occupational Therapy, Physical Therapy, and HHA.    My clinical findings support the need for the above skilled services because: Requires assistance of another person or specialized equipment to access medical services because patient: Requires supervision of another for safe transfer...    Patient to re-establish plan of care with their PCP within 7-10 days after leaving the facility to reestablish care.  Medicare certified PECOS provider: Kerri Robins CNP  Date: July 31,  2025              Sincerely,        Kerri Robins CNP    Electronically signed

## 2025-07-31 NOTE — PROGRESS NOTES
[facility] GERIATRICS DISCHARGE SUMMARY  PATIENT'S NAME: Clarke Moreira  YOB: 1950  MEDICAL RECORD NUMBER:  [id number]  Place of Service where encounter took place:  [facility] (TCU) [[id number]]    PRIMARY CARE PROVIDER AND CLINIC RESPONSIBLE AFTER TRANSFER:   Henrry Shepard MD, [address] [address]    Nursing Home: [facility]     Transferring providers: Kerri Robins CNP, Luis Nova MD  Recent Hospitalization/ED:  [facility] stay 6/5/2025 to 6/15/2025.  Date of SNF Admission: 6/15/2025  Date of SNF (anticipated) Discharge: 8/2/2025  Discharged to: previous independent home  Cognitive Scores: see therapy notes   Physical Function: see therapy scores   DME: No new DME needed    CODE STATUS/ADVANCE DIRECTIVES DISCUSSION:  No CPR- Do NOT Intubate   ALLERGIES: Seasonal allergies    NURSING FACILITY COURSE   Medication Changes/Rationale:   See below     Summary of nursing facility stay:     Generalized weakness  Recurrent Falls  Peripheral Neuropathy  Presented with two weeks of increased weakness and two recent falls in late May/early June, causing him to be essentially bed-bound. No acute fractures found on admission.   Suspect falls are likely from combination of deconditioning and muscle weakness, BLE edema (currently well managed), and diabetic neuropathy.   PT/OT, SW for discharge planning to safe environment      Cellulitis  Keflex completed, continue with compressions, elevation  Monitor      Chronic AF on Warfarin  asymptomatic A-fib with RVR, requiring restart of rate control with Metoprolol. EKG with rate controlled AF.   Per review of recent cardiology note, his cardiologist wants to avoid beta blockers and the plan was to start diltiazem if rate control needed for his Afib.   - Diltiazem 30 mg QID; may need ongoing titration  - Cards follow up  - Labs stable as above  INR  stable   Currently on Coumadin 9mg daily at HS. Goal INR 2-3  Monitor INR, recheck INR Monday 8/4        Unsafe living environment  Notes he lives alone, per hospital notes floor covered with food, urine.   SW for discharge planning  Discussed LTC        Anemia  Hgb trending down slowly over past couple days from 12-13 --> 10.9. No noted bleeding source, no hematochezia or hematemesis, and no noted increased swelling or signs of increased fluid retention on exam.   Continue to monitor      MICHELLE, prerenal, resolved  Hyponatremia, resolved  Hypokalemia, resolved   Cr on admit 1.42, baseline appears to be around 1.0.   Sodium 126 on admission with baseline ~135. Here, has fluctuated between hyponatremia and hypernatremia throughout admission, which is consistent with his history. Hypokalemia and hyponatremia likely 2/2 torsemide and chlorthalidone use.  - F/U Cardiology outpatient   - Continue PTA torsemide 100 mg daily  - HOLDING PTA chlorthalidone (BP and edema well controlled without this med)              - if increased swelling or increased BP, consider restarting  - KCl 40 mEq supplement daily (changed from BID)  Recheck labs stable as above, Na 131       Constipation  Has Senna PRN, Miralax PRN        HFpEF without current exacerbation  Chronic AF on Warfarin  HTN  HLD  EF 55-60%.   Follow with HF clinic -printed weights from TCU to help guide weights at home.   BLE edema currently better, continue regular compression stocking use and skin cares.   - HOLD the chlorthalidone 25 mg daily-may need to restart.   -Continue diltiazem 30 mg four times daily, can increase as needed  -Continue K  -- PTA Mg oxide 400mg daily  -- PTA Torsemide 100mg  -- PTA Spironolactone 50mg daily   -- PTA ASA 81 daily  -- PTA Atorvastatin 40mg daily   Daily weights, stable      T2DM  Peripheral Neuropathy  A1C on 3/26/25 6.2, stable.   Metformin 500mg BID.   Has Ozempic but has not started yet.   He follows with podiatry and was seen  yesterday 7/30, follow up again in 12 weeks or sooner if needed   -- PTA Metformin   -- Follow up podiatry as above  -Blood sugars stable in TCU, continue Metformin      Hypothyroidism  TSH on admit 0.48. WNL in 10/2024.  -- Continue PTA Synthroid 175mcg daily      MDD  Follows with therapist outpatient. No chronic medications.   Has follow up with Dr. Buenrostro at [facility]     Discharge Medications:  MED REC REQUIRED  Post Medication Reconciliation Status: discharge medications reconciled and changed, per note/orders       Current Outpatient Medications   Medication Sig Dispense Refill    aspirin (ASA) 81 MG chewable tablet Take 1 tablet (81 mg) by mouth daily. 200 tablet 2    atorvastatin (LIPITOR) 40 MG tablet Take 1 tablet (40 mg) by mouth daily 90 tablet 3    CERTAVITE/ANTIOXIDANTS tablet TAKE ONE TABLET BY MOUTH ONE TIME DAILY 100 tablet 0    [Paused] chlorthalidone (HYGROTON) 25 MG tablet Take 1 tablet (25 mg) by mouth daily. 90 tablet 11    diltiazem (CARDIZEM) 30 MG tablet Take 1 tablet (30 mg) by mouth 4 times daily.      FEROSUL 325 (65 Fe) MG tablet Take 1 tablet (325 mg) by mouth every other day. For iron deficiency anemia 60 tablet 0    hypromellose (ARTIFICIAL TEARS) 0.5 % SOLN ophthalmic solution Place 1 drop into both eyes 4 times daily as needed for dry eyes.      levothyroxine (SYNTHROID/LEVOTHROID) 175 MCG tablet Take 1 tablet (175 mcg) by mouth every morning (before breakfast) 90 tablet 0    magnesium oxide (MAG-OX) 400 MG tablet Take 1 tablet (400 mg) by mouth daily. 90 tablet 3    metFORMIN (GLUCOPHAGE) 500 MG tablet Take 1 tablet (500 mg) by mouth 2 times daily (with meals). 180 tablet 3    Omega-3 Fatty Acids (EQL FISH OIL) 1000 MG CAPS Take 1 capsule by mouth daily. 90 capsule 0    potassium chloride chen ER (KLOR-CON M20) 20 MEQ CR tablet Take 2 tablets (40 mEq) by mouth daily.      senna-docusate (SENEXON-S) 8.6-50 MG tablet Take 1 to 2 tablets by mouth 2 times daily as needed  "for constipation 180 tablet 0    spironolactone (ALDACTONE) 50 MG tablet Take 1 tablet (50 mg) by mouth daily.      torsemide (DEMADEX) 20 MG tablet Take 5 tablets (100 mg) by mouth daily.      urea (CARMOL) 10 % external lotion Apply topically 2 times daily as needed for dry skin.      vitamin C (ASCORBIC ACID) 1000 MG TABS Take 1,000 mg by mouth daily      Vitamin D, Cholecalciferol, 25 MCG (1000 UT) CAPS Take 1 capsule by mouth daily.      warfarin ANTICOAGULANT (COUMADIN) 3 MG tablet TAKE THREE TABLETS BY MOUTH AT BEDTIME 90 tablet 0          Controlled medications:   not applicable/none     Past Medical History:   Past Medical History:   Diagnosis Date    Atrial fibrillation (H)     Basal cell carcinoma     Chronic anticoagulation     Diastolic heart failure (H)     Dyslipidemia     Essential hypertension     Morbid obesity (H)     Sleep-disordered breathing     Type 2 diabetes mellitus (H)      Physical Exam:   Vitals: /71   Pulse 81   Temp 97.7  F (36.5  C)   Resp 18   Ht 1.778 m (5' 10\")   Wt (!) 151 kg (333 lb)   SpO2 95%   BMI 47.78 kg/m    BMI: Body mass index is 47.78 kg/m .  GENERAL APPEARANCE:  Alert, in no distress, oriented, cooperative  ENT:  Mouth and posterior oropharynx normal, moist mucous membranes  EYES:  EOM, conjunctivae, lids, pupils and irises normal  NECK:  No adenopathy,masses or thyromegaly  RESP:  respiratory effort and palpation of chest normal, lungs clear to auscultation , no respiratory distress  CV:  Palpation and auscultation of heart done , regular rate and rhythm, no murmur, rub, or gallop, +1 edema to LE, compressions on  GI/ABDOMEN:  normal bowel sounds, soft, nontender, no hepatosplenomegaly or other masses  M/S:   moves extremities spontaneously. Ambulation not tested   SKIN:  no rashes to exposed skin.   NEURO:   intact   PSYCH:  oriented X 3, normal insight, judgement and memory, affect and mood normal,        SNF labs: Recent labs in [facility] reviewed by me " today.     Last Comprehensive Metabolic Panel:  Lab Results   Component Value Date     07/14/2025    POTASSIUM 3.7 07/14/2025    CHLORIDE 98 07/14/2025    CO2 29 07/14/2025    ANIONGAP 9 07/14/2025     (H) 07/14/2025    BUN 15.4 07/14/2025    CR 1.00 07/14/2025    GFRESTIMATED 78 07/14/2025    CHERI 9.0 07/14/2025       Lab Results   Component Value Date    WBC 5.3 07/14/2025    WBC 11.1 05/06/2021     Lab Results   Component Value Date    RBC 4.02 07/14/2025    RBC 5.45 05/06/2021     Lab Results   Component Value Date    HGB 10.2 07/14/2025    HGB 16.3 05/06/2021     Lab Results   Component Value Date    HCT 33.1 07/14/2025    HCT 49.4 05/06/2021     Lab Results   Component Value Date    MCV 82 07/14/2025    MCV 91 05/06/2021     Lab Results   Component Value Date    MCH 25.4 07/14/2025    MCH 29.5 05/06/2021     Lab Results   Component Value Date    MCHC 30.8 07/14/2025    MCHC 32.6 05/06/2021     Lab Results   Component Value Date    RDW 14.3 07/14/2025    RDW 13.6 05/06/2021     Lab Results   Component Value Date     07/14/2025     05/06/2021       DISCHARGE PLAN:  Follow up labs: Recommend BMP on clinic follow up , INR on Monday 8/4  Medical Follow Up:      Follow up with primary care provider in 1 weeks  Follow up with specialist Cardiology, podiatry as directed    Current [place] scheduled appointments:  Appointments in Next Year      Jul 31, 2025 7:30 AM  Discharge Summary with Kerri Robins CNP  [facility] ([facility] ) [phone]     Aug 07, 2025 9:00 AM  Telephone Visit with Lety Buenrostro PsyD  [facility] ([facility]) [phone]     Oct 08, 2025 8:20 AM  (Arrive by 8:05 AM)  RETURN VISIT with Jesús Lagos DPM  [facility] [place] ([facility] ) [phone]           Discharge Services: Home Care:   Occupational Therapy, Physical Therapy, Registered Nurse, and Home Health Aide  Discharge Instructions Verbalized to Patient at Discharge:   Weigh yourself daily in the morning and keep a record. Call your primary clinic: a) if you are more short of breath, or b) if your weight changes more than 3 pounds in one day or more than 5 pounds in one week.     TOTAL DISCHARGE TIME:   Greater than 30 minutes  Electronically signed by:  Kerri Robins CNP     Documentation of Face to Face and Certification for Home Health Services    I certify that services are/were furnished while this patient was under the care of a physician and that a physician or an allowed non-physician practitioner (NPP), had a face-to-face encounter that meets the physician face-to-face encounter requirements. The encounter was in whole, or in part, related to the primary reason for home health. The patient is confined to his/her home and needs intermittent skilled nursing, physical therapy, speech-language pathology, or the continued need for occupational therapy. A plan of care has been established by a physician and is periodically reviewed by a physician.  Date of Face-to-Face Encounter: 7/31/2025.    I certify that, based on my findings, the following services are medically necessary home health services: Nursing, Occupational Therapy, Physical Therapy, and HHA.    My clinical findings support the need for the above skilled services because: Requires assistance of another person or specialized equipment to access medical services because patient: Requires supervision of another for safe transfer...    Patient to re-establish plan of care with their PCP within 7-10 days after leaving the facility to reestablish care.  Medicare certified [facility] provider: Kerri Robins CNP  Date: July 31, 2025

## 2025-08-03 LAB — INR (EXTERNAL): 2

## 2025-08-04 ENCOUNTER — ANTICOAGULATION THERAPY VISIT (OUTPATIENT)
Dept: ANTICOAGULATION | Facility: CLINIC | Age: 75
End: 2025-08-04
Payer: COMMERCIAL

## 2025-08-04 ENCOUNTER — TELEPHONE (OUTPATIENT)
Dept: FAMILY MEDICINE | Facility: CLINIC | Age: 75
End: 2025-08-04
Payer: COMMERCIAL

## 2025-08-04 DIAGNOSIS — Z79.01 LONG TERM (CURRENT) USE OF ANTICOAGULANTS: Primary | ICD-10-CM

## 2025-08-04 DIAGNOSIS — I48.0 PAROXYSMAL ATRIAL FIBRILLATION (H): ICD-10-CM

## 2025-08-06 ENCOUNTER — TELEPHONE (OUTPATIENT)
Dept: FAMILY MEDICINE | Facility: CLINIC | Age: 75
End: 2025-08-06
Payer: COMMERCIAL

## 2025-08-07 ENCOUNTER — VIRTUAL VISIT (OUTPATIENT)
Dept: PSYCHOLOGY | Facility: CLINIC | Age: 75
End: 2025-08-07
Payer: COMMERCIAL

## 2025-08-07 DIAGNOSIS — F41.1 GAD (GENERALIZED ANXIETY DISORDER): ICD-10-CM

## 2025-08-07 DIAGNOSIS — F33.1 MODERATE EPISODE OF RECURRENT MAJOR DEPRESSIVE DISORDER (H): ICD-10-CM

## 2025-08-07 DIAGNOSIS — F34.1 PERSISTENT DEPRESSIVE DISORDER: Primary | ICD-10-CM

## 2025-08-08 ENCOUNTER — OFFICE VISIT (OUTPATIENT)
Dept: FAMILY MEDICINE | Facility: CLINIC | Age: 75
End: 2025-08-08
Payer: COMMERCIAL

## 2025-08-08 VITALS
SYSTOLIC BLOOD PRESSURE: 125 MMHG | BODY MASS INDEX: 45.1 KG/M2 | WEIGHT: 315 LBS | RESPIRATION RATE: 14 BRPM | DIASTOLIC BLOOD PRESSURE: 79 MMHG | TEMPERATURE: 98.1 F | HEIGHT: 70 IN | HEART RATE: 87 BPM | OXYGEN SATURATION: 96 %

## 2025-08-08 DIAGNOSIS — D64.9 ANEMIA, UNSPECIFIED TYPE: ICD-10-CM

## 2025-08-08 DIAGNOSIS — Z12.11 SCREEN FOR COLON CANCER: ICD-10-CM

## 2025-08-08 DIAGNOSIS — I50.32 CHRONIC HEART FAILURE WITH PRESERVED EJECTION FRACTION (H): ICD-10-CM

## 2025-08-08 DIAGNOSIS — L98.9 SKIN LESION: ICD-10-CM

## 2025-08-08 DIAGNOSIS — I11.0 HYPERTENSIVE HEART DISEASE WITH HEART FAILURE (H): ICD-10-CM

## 2025-08-08 DIAGNOSIS — Z09 HOSPITAL DISCHARGE FOLLOW-UP: Primary | ICD-10-CM

## 2025-08-08 DIAGNOSIS — E63.9 NUTRITIONAL DEFICIENCY: ICD-10-CM

## 2025-08-08 DIAGNOSIS — I48.20 CHRONIC ATRIAL FIBRILLATION (H): ICD-10-CM

## 2025-08-08 LAB
ANION GAP SERPL CALCULATED.3IONS-SCNC: 14 MMOL/L (ref 7–15)
BUN SERPL-MCNC: 14.6 MG/DL (ref 8–23)
CALCIUM SERPL-MCNC: 9.6 MG/DL (ref 8.8–10.4)
CHLORIDE SERPL-SCNC: 97 MMOL/L (ref 98–107)
CREAT SERPL-MCNC: 1.22 MG/DL (ref 0.67–1.17)
CREAT UR-MCNC: 133 MG/DL
EGFRCR SERPLBLD CKD-EPI 2021: 62 ML/MIN/1.73M2
ERYTHROCYTE [DISTWIDTH] IN BLOOD BY AUTOMATED COUNT: 14.2 % (ref 10–15)
GLUCOSE SERPL-MCNC: 105 MG/DL (ref 70–99)
HCO3 SERPL-SCNC: 23 MMOL/L (ref 22–29)
HCT VFR BLD AUTO: 35.7 % (ref 40–53)
HGB BLD-MCNC: 10.7 G/DL (ref 13.3–17.7)
MCH RBC QN AUTO: 24 PG (ref 26.5–33)
MCHC RBC AUTO-ENTMCNC: 30 G/DL (ref 31.5–36.5)
MCV RBC AUTO: 80 FL (ref 78–100)
MICROALBUMIN UR-MCNC: <12 MG/L
MICROALBUMIN/CREAT UR: NORMAL MG/G{CREAT}
NT-PROBNP SERPL-MCNC: 1005 PG/ML (ref 0–852)
PLATELET # BLD AUTO: 371 10E3/UL (ref 150–450)
POTASSIUM SERPL-SCNC: 4.8 MMOL/L (ref 3.4–5.3)
RBC # BLD AUTO: 4.46 10E6/UL (ref 4.4–5.9)
SODIUM SERPL-SCNC: 134 MMOL/L (ref 135–145)
WBC # BLD AUTO: 8.6 10E3/UL (ref 4–11)

## 2025-08-08 PROCEDURE — 82570 ASSAY OF URINE CREATININE: CPT

## 2025-08-08 PROCEDURE — G2211 COMPLEX E/M VISIT ADD ON: HCPCS

## 2025-08-08 PROCEDURE — 3074F SYST BP LT 130 MM HG: CPT

## 2025-08-08 PROCEDURE — 85027 COMPLETE CBC AUTOMATED: CPT

## 2025-08-08 PROCEDURE — 83550 IRON BINDING TEST: CPT

## 2025-08-08 PROCEDURE — 80048 BASIC METABOLIC PNL TOTAL CA: CPT

## 2025-08-08 PROCEDURE — 82043 UR ALBUMIN QUANTITATIVE: CPT

## 2025-08-08 PROCEDURE — 3078F DIAST BP <80 MM HG: CPT

## 2025-08-08 PROCEDURE — 83880 ASSAY OF NATRIURETIC PEPTIDE: CPT

## 2025-08-08 PROCEDURE — 83540 ASSAY OF IRON: CPT

## 2025-08-08 PROCEDURE — 82728 ASSAY OF FERRITIN: CPT

## 2025-08-08 PROCEDURE — 99214 OFFICE O/P EST MOD 30 MIN: CPT | Mod: GC

## 2025-08-08 PROCEDURE — 36415 COLL VENOUS BLD VENIPUNCTURE: CPT

## 2025-08-08 RX ORDER — SPIRONOLACTONE 50 MG/1
50 TABLET, FILM COATED ORAL DAILY
Qty: 90 TABLET | Refills: 3 | Status: SHIPPED | OUTPATIENT
Start: 2025-08-08

## 2025-08-08 RX ORDER — MAGNESIUM OXIDE 400 MG/1
400 TABLET ORAL DAILY
Qty: 90 TABLET | Refills: 3 | Status: SHIPPED | OUTPATIENT
Start: 2025-08-08

## 2025-08-08 ASSESSMENT — ANXIETY QUESTIONNAIRES
1. FEELING NERVOUS, ANXIOUS, OR ON EDGE: NEARLY EVERY DAY
2. NOT BEING ABLE TO STOP OR CONTROL WORRYING: SEVERAL DAYS
5. BEING SO RESTLESS THAT IT IS HARD TO SIT STILL: NOT AT ALL
IF YOU CHECKED OFF ANY PROBLEMS ON THIS QUESTIONNAIRE, HOW DIFFICULT HAVE THESE PROBLEMS MADE IT FOR YOU TO DO YOUR WORK, TAKE CARE OF THINGS AT HOME, OR GET ALONG WITH OTHER PEOPLE: SOMEWHAT DIFFICULT
6. BECOMING EASILY ANNOYED OR IRRITABLE: NEARLY EVERY DAY
GAD7 TOTAL SCORE: 13
3. WORRYING TOO MUCH ABOUT DIFFERENT THINGS: NOT AT ALL
7. FEELING AFRAID AS IF SOMETHING AWFUL MIGHT HAPPEN: NEARLY EVERY DAY
GAD7 TOTAL SCORE: 13

## 2025-08-08 ASSESSMENT — PATIENT HEALTH QUESTIONNAIRE - PHQ9
5. POOR APPETITE OR OVEREATING: NEARLY EVERY DAY
SUM OF ALL RESPONSES TO PHQ QUESTIONS 1-9: 6
SUM OF ALL RESPONSES TO PHQ QUESTIONS 1-9: 6
10. IF YOU CHECKED OFF ANY PROBLEMS, HOW DIFFICULT HAVE THESE PROBLEMS MADE IT FOR YOU TO DO YOUR WORK, TAKE CARE OF THINGS AT HOME, OR GET ALONG WITH OTHER PEOPLE: EXTREMELY DIFFICULT

## 2025-08-11 ENCOUNTER — DOCUMENTATION ONLY (OUTPATIENT)
Dept: FAMILY MEDICINE | Facility: CLINIC | Age: 75
End: 2025-08-11
Payer: COMMERCIAL

## 2025-08-11 ENCOUNTER — TELEPHONE (OUTPATIENT)
Dept: FAMILY MEDICINE | Facility: CLINIC | Age: 75
End: 2025-08-11
Payer: COMMERCIAL

## 2025-08-11 ENCOUNTER — PATIENT OUTREACH (OUTPATIENT)
Dept: CARE COORDINATION | Facility: CLINIC | Age: 75
End: 2025-08-11
Payer: COMMERCIAL

## 2025-08-11 DIAGNOSIS — I48.11 LONGSTANDING PERSISTENT ATRIAL FIBRILLATION (H): ICD-10-CM

## 2025-08-11 LAB
FERRITIN SERPL-MCNC: 28 NG/ML (ref 31–409)
IRON BINDING CAPACITY (ROCHE): 410 UG/DL (ref 240–430)
IRON SATN MFR SERPL: 3 % (ref 15–46)
IRON SERPL-MCNC: 12 UG/DL (ref 61–157)

## 2025-08-11 RX ORDER — DILTIAZEM HYDROCHLORIDE 30 MG/1
30 TABLET, FILM COATED ORAL 4 TIMES DAILY
Qty: 120 TABLET | Refills: 1 | Status: SHIPPED | OUTPATIENT
Start: 2025-08-11

## 2025-08-12 ENCOUNTER — TELEPHONE (OUTPATIENT)
Dept: FAMILY MEDICINE | Facility: CLINIC | Age: 75
End: 2025-08-12
Payer: COMMERCIAL

## 2025-08-13 ENCOUNTER — PATIENT OUTREACH (OUTPATIENT)
Dept: CARE COORDINATION | Facility: CLINIC | Age: 75
End: 2025-08-13
Payer: COMMERCIAL

## 2025-08-13 ENCOUNTER — TELEPHONE (OUTPATIENT)
Dept: FAMILY MEDICINE | Facility: CLINIC | Age: 75
End: 2025-08-13
Payer: COMMERCIAL

## 2025-08-20 ENCOUNTER — DOCUMENTATION ONLY (OUTPATIENT)
Dept: FAMILY MEDICINE | Facility: CLINIC | Age: 75
End: 2025-08-20
Payer: COMMERCIAL

## 2025-08-21 ENCOUNTER — MEDICAL CORRESPONDENCE (OUTPATIENT)
Dept: HEALTH INFORMATION MANAGEMENT | Facility: CLINIC | Age: 75
End: 2025-08-21
Payer: COMMERCIAL

## 2025-08-25 ENCOUNTER — TELEPHONE (OUTPATIENT)
Dept: ANTICOAGULATION | Facility: CLINIC | Age: 75
End: 2025-08-25
Payer: COMMERCIAL

## 2025-08-25 ENCOUNTER — DOCUMENTATION ONLY (OUTPATIENT)
Dept: FAMILY MEDICINE | Facility: CLINIC | Age: 75
End: 2025-08-25
Payer: COMMERCIAL

## 2025-08-26 ENCOUNTER — MEDICAL CORRESPONDENCE (OUTPATIENT)
Dept: HEALTH INFORMATION MANAGEMENT | Facility: CLINIC | Age: 75
End: 2025-08-26
Payer: COMMERCIAL

## 2025-08-27 ENCOUNTER — DOCUMENTATION ONLY (OUTPATIENT)
Dept: FAMILY MEDICINE | Facility: CLINIC | Age: 75
End: 2025-08-27
Payer: COMMERCIAL

## 2025-08-27 ENCOUNTER — ANTICOAGULATION THERAPY VISIT (OUTPATIENT)
Dept: ANTICOAGULATION | Facility: CLINIC | Age: 75
End: 2025-08-27
Payer: COMMERCIAL

## 2025-08-27 DIAGNOSIS — I48.0 PAROXYSMAL ATRIAL FIBRILLATION (H): ICD-10-CM

## 2025-08-27 DIAGNOSIS — Z79.01 LONG TERM (CURRENT) USE OF ANTICOAGULANTS: Primary | ICD-10-CM

## 2025-08-28 ENCOUNTER — OFFICE VISIT (OUTPATIENT)
Dept: FAMILY MEDICINE | Facility: CLINIC | Age: 75
End: 2025-08-28
Payer: COMMERCIAL

## 2025-08-28 ENCOUNTER — ANTICOAGULATION THERAPY VISIT (OUTPATIENT)
Dept: ANTICOAGULATION | Facility: CLINIC | Age: 75
End: 2025-08-28

## 2025-08-28 VITALS
HEART RATE: 95 BPM | DIASTOLIC BLOOD PRESSURE: 83 MMHG | OXYGEN SATURATION: 96 % | SYSTOLIC BLOOD PRESSURE: 135 MMHG | HEIGHT: 71 IN | WEIGHT: 315 LBS | BODY MASS INDEX: 44.1 KG/M2 | TEMPERATURE: 97.7 F | RESPIRATION RATE: 17 BRPM

## 2025-08-28 DIAGNOSIS — I48.19 PERSISTENT ATRIAL FIBRILLATION (H): ICD-10-CM

## 2025-08-28 DIAGNOSIS — L98.9 SKIN LESION: Primary | ICD-10-CM

## 2025-08-28 DIAGNOSIS — Z79.01 LONG TERM (CURRENT) USE OF ANTICOAGULANTS: ICD-10-CM

## 2025-08-28 DIAGNOSIS — I48.0 PAROXYSMAL ATRIAL FIBRILLATION (H): ICD-10-CM

## 2025-08-28 DIAGNOSIS — Z79.01 LONG TERM (CURRENT) USE OF ANTICOAGULANTS: Primary | ICD-10-CM

## 2025-08-28 DIAGNOSIS — C44.310 BCC (BASAL CELL CARCINOMA), FACE: ICD-10-CM

## 2025-08-28 LAB — INR BLD: 3.2 (ref 0.9–1.1)

## 2025-08-28 ASSESSMENT — PATIENT HEALTH QUESTIONNAIRE - PHQ9
10. IF YOU CHECKED OFF ANY PROBLEMS, HOW DIFFICULT HAVE THESE PROBLEMS MADE IT FOR YOU TO DO YOUR WORK, TAKE CARE OF THINGS AT HOME, OR GET ALONG WITH OTHER PEOPLE: SOMEWHAT DIFFICULT
SUM OF ALL RESPONSES TO PHQ QUESTIONS 1-9: 9
SUM OF ALL RESPONSES TO PHQ QUESTIONS 1-9: 9

## 2025-08-28 ASSESSMENT — PAIN SCALES - GENERAL: PAINLEVEL_OUTOF10: MILD PAIN (1)

## 2025-09-02 ENCOUNTER — DOCUMENTATION ONLY (OUTPATIENT)
Dept: FAMILY MEDICINE | Facility: CLINIC | Age: 75
End: 2025-09-02
Payer: COMMERCIAL

## 2025-09-02 ENCOUNTER — TELEPHONE (OUTPATIENT)
Dept: FAMILY MEDICINE | Facility: CLINIC | Age: 75
End: 2025-09-02
Payer: COMMERCIAL

## 2025-09-02 ENCOUNTER — ANTICOAGULATION THERAPY VISIT (OUTPATIENT)
Dept: ANTICOAGULATION | Facility: CLINIC | Age: 75
End: 2025-09-02
Payer: COMMERCIAL

## 2025-09-02 DIAGNOSIS — I48.0 PAROXYSMAL ATRIAL FIBRILLATION (H): ICD-10-CM

## 2025-09-02 DIAGNOSIS — Z79.01 LONG TERM (CURRENT) USE OF ANTICOAGULANTS: Primary | ICD-10-CM

## 2025-09-02 LAB
INR (EXTERNAL): 2.2 (ref 0.9–1.1)
PATH REPORT.COMMENTS IMP SPEC: ABNORMAL
PATH REPORT.COMMENTS IMP SPEC: ABNORMAL
PATH REPORT.COMMENTS IMP SPEC: YES
PATH REPORT.FINAL DX SPEC: ABNORMAL
PATH REPORT.GROSS SPEC: ABNORMAL
PATH REPORT.MICROSCOPIC SPEC OTHER STN: ABNORMAL
PATH REPORT.RELEVANT HX SPEC: ABNORMAL

## 2025-09-04 ENCOUNTER — VIRTUAL VISIT (OUTPATIENT)
Dept: PSYCHOLOGY | Facility: CLINIC | Age: 75
End: 2025-09-04
Payer: COMMERCIAL

## 2025-09-04 DIAGNOSIS — F33.1 MODERATE EPISODE OF RECURRENT MAJOR DEPRESSIVE DISORDER (H): ICD-10-CM

## 2025-09-04 DIAGNOSIS — F41.1 GAD (GENERALIZED ANXIETY DISORDER): ICD-10-CM

## 2025-09-04 DIAGNOSIS — F34.1 PERSISTENT DEPRESSIVE DISORDER: Primary | ICD-10-CM

## (undated) RX ORDER — HEPARIN SODIUM 1000 [USP'U]/ML
INJECTION, SOLUTION INTRAVENOUS; SUBCUTANEOUS
Status: DISPENSED
Start: 2018-06-06

## (undated) RX ORDER — FENTANYL CITRATE 50 UG/ML
INJECTION, SOLUTION INTRAMUSCULAR; INTRAVENOUS
Status: DISPENSED
Start: 2018-06-06

## (undated) RX ORDER — NITROGLYCERIN 5 MG/ML
VIAL (ML) INTRAVENOUS
Status: DISPENSED
Start: 2018-06-06

## (undated) RX ORDER — PHENYLEPHRINE HCL IN 0.9% NACL 1 MG/10 ML
SYRINGE (ML) INTRAVENOUS
Status: DISPENSED
Start: 2018-06-06

## (undated) RX ORDER — LIDOCAINE HYDROCHLORIDE 10 MG/ML
INJECTION, SOLUTION EPIDURAL; INFILTRATION; INTRACAUDAL; PERINEURAL
Status: DISPENSED
Start: 2018-06-06